# Patient Record
Sex: FEMALE | Race: BLACK OR AFRICAN AMERICAN | NOT HISPANIC OR LATINO | Employment: UNEMPLOYED | URBAN - METROPOLITAN AREA
[De-identification: names, ages, dates, MRNs, and addresses within clinical notes are randomized per-mention and may not be internally consistent; named-entity substitution may affect disease eponyms.]

---

## 2021-06-25 ENCOUNTER — OFFICE VISIT (OUTPATIENT)
Dept: FAMILY MEDICINE CLINIC | Facility: CLINIC | Age: 31
End: 2021-06-25

## 2021-06-25 VITALS
TEMPERATURE: 98 F | HEART RATE: 74 BPM | OXYGEN SATURATION: 99 % | DIASTOLIC BLOOD PRESSURE: 68 MMHG | BODY MASS INDEX: 24.91 KG/M2 | WEIGHT: 155 LBS | RESPIRATION RATE: 18 BRPM | SYSTOLIC BLOOD PRESSURE: 118 MMHG | HEIGHT: 66 IN

## 2021-06-25 DIAGNOSIS — M79.641 HAND PAIN, RIGHT: ICD-10-CM

## 2021-06-25 DIAGNOSIS — Z00.00 PHYSICAL EXAM, ANNUAL: Primary | ICD-10-CM

## 2021-06-25 DIAGNOSIS — M25.572 ACUTE LEFT ANKLE PAIN: ICD-10-CM

## 2021-06-25 DIAGNOSIS — M54.2 NECK PAIN: ICD-10-CM

## 2021-06-25 LAB
25(OH)D3 SERPL-MCNC: 8.2 NG/ML (ref 30–100)
ALBUMIN SERPL-MCNC: 3.1 G/DL (ref 3.5–5.2)
ALBUMIN/GLOB SERPL: 0.8 G/DL
ALP SERPL-CCNC: 38 U/L (ref 39–117)
ALT SERPL W P-5'-P-CCNC: 7 U/L (ref 1–33)
ANION GAP SERPL CALCULATED.3IONS-SCNC: 6.3 MMOL/L (ref 5–15)
AST SERPL-CCNC: 14 U/L (ref 1–32)
BILIRUB SERPL-MCNC: 0.2 MG/DL (ref 0–1.2)
BUN SERPL-MCNC: 12 MG/DL (ref 6–20)
BUN/CREAT SERPL: 15.6 (ref 7–25)
CALCIUM SPEC-SCNC: 8 MG/DL (ref 8.6–10.5)
CHLORIDE SERPL-SCNC: 108 MMOL/L (ref 98–107)
CHOLEST SERPL-MCNC: 69 MG/DL (ref 0–200)
CO2 SERPL-SCNC: 24.7 MMOL/L (ref 22–29)
CREAT SERPL-MCNC: 0.77 MG/DL (ref 0.57–1)
DEPRECATED RDW RBC AUTO: 46.4 FL (ref 37–54)
ERYTHROCYTE [DISTWIDTH] IN BLOOD BY AUTOMATED COUNT: 21.3 % (ref 12.3–15.4)
GFR SERPL CREATININE-BSD FRML MDRD: 107 ML/MIN/1.73
GLOBULIN UR ELPH-MCNC: 4 GM/DL
GLUCOSE SERPL-MCNC: 80 MG/DL (ref 65–99)
HCT VFR BLD AUTO: 27.1 % (ref 34–46.6)
HDLC SERPL-MCNC: 30 MG/DL (ref 40–60)
HGB BLD-MCNC: 7.2 G/DL (ref 12–15.9)
LDLC SERPL CALC-MCNC: 25 MG/DL (ref 0–100)
LDLC/HDLC SERPL: 0.92 {RATIO}
MCH RBC QN AUTO: 16.8 PG (ref 26.6–33)
MCHC RBC AUTO-ENTMCNC: 26.6 G/DL (ref 31.5–35.7)
MCV RBC AUTO: 63.2 FL (ref 79–97)
PLATELET # BLD AUTO: 240 10*3/MM3 (ref 140–450)
POTASSIUM SERPL-SCNC: 3.9 MMOL/L (ref 3.5–5.2)
PROT SERPL-MCNC: 7.1 G/DL (ref 6–8.5)
RBC # BLD AUTO: 4.29 10*6/MM3 (ref 3.77–5.28)
SODIUM SERPL-SCNC: 139 MMOL/L (ref 136–145)
TRIGL SERPL-MCNC: 57 MG/DL (ref 0–150)
VLDLC SERPL-MCNC: 14 MG/DL (ref 5–40)
WBC # BLD AUTO: 3.63 10*3/MM3 (ref 3.4–10.8)

## 2021-06-25 PROCEDURE — 85027 COMPLETE CBC AUTOMATED: CPT | Performed by: INTERNAL MEDICINE

## 2021-06-25 PROCEDURE — 99385 PREV VISIT NEW AGE 18-39: CPT | Performed by: INTERNAL MEDICINE

## 2021-06-25 PROCEDURE — 36415 COLL VENOUS BLD VENIPUNCTURE: CPT | Performed by: INTERNAL MEDICINE

## 2021-06-25 PROCEDURE — 82306 VITAMIN D 25 HYDROXY: CPT | Performed by: INTERNAL MEDICINE

## 2021-06-25 PROCEDURE — 80053 COMPREHEN METABOLIC PANEL: CPT | Performed by: INTERNAL MEDICINE

## 2021-06-25 PROCEDURE — 80061 LIPID PANEL: CPT | Performed by: INTERNAL MEDICINE

## 2021-06-25 RX ORDER — ERGOCALCIFEROL 1.25 MG/1
50000 CAPSULE ORAL WEEKLY
Qty: 16 CAPSULE | Refills: 3 | Status: SHIPPED | OUTPATIENT
Start: 2021-06-25 | End: 2021-09-27 | Stop reason: SDUPTHER

## 2021-06-25 NOTE — PROGRESS NOTES
Subjective   Dee Herrera is a 31 y.o. female.     Vitals:    06/25/21 1009   BP: 118/68   Pulse: 74   Resp: 18   Temp: 98 °F (36.7 °C)   SpO2: 99%      Body mass index is 25.02 kg/m².     History of Present Illness   Patient was seen for physical.  Patient's diet and physical activity were discussed at this visit.  Patient did have some right hand pain and was sent for x-ray of the right hand, patient also had left ankle swelling pain.  Patient was seen in the immediate care center and x-ray was negative for fracture.  Patient was advised to use an Aircast ice packs.  Patient states this is been going on now for 2 weeks.  Patient did have an MRI ordered.  Patient also had neck pain and C-spine x-rays were ordered.  Patient's labs came back with a hemoglobin of 7.2.  Patient was advised to go to the emergency room immediately.    Dictated utilizing Dragon dictation. If there are questions or for further clarification, please contact me.  The following portions of the patient's history were reviewed and updated as appropriate: allergies, current medications, past family history, past medical history, past social history, past surgical history and problem list.    Review of Systems   Constitutional: Negative for fatigue and fever.   HENT: Positive for congestion. Negative for trouble swallowing.    Eyes: Negative for discharge and visual disturbance.   Respiratory: Negative for choking and shortness of breath.    Cardiovascular: Negative for chest pain and palpitations.   Gastrointestinal: Negative for abdominal pain and blood in stool.   Endocrine: Negative.    Genitourinary: Negative for genital sores and hematuria.   Musculoskeletal: Positive for gait problem, joint swelling and neck pain.        Hand and left ankle pain   Skin: Negative for color change, pallor, rash and wound.   Allergic/Immunologic: Positive for environmental allergies. Negative for immunocompromised state.   Neurological: Negative for facial  asymmetry and speech difficulty.   Psychiatric/Behavioral: Negative for hallucinations and suicidal ideas.       Objective   Physical Exam  Vitals and nursing note reviewed.   Constitutional:       General: She is not in acute distress.     Appearance: Normal appearance. She is well-developed. She is not ill-appearing, toxic-appearing or diaphoretic.   HENT:      Head: Normocephalic and atraumatic.      Right Ear: Tympanic membrane, ear canal and external ear normal. There is no impacted cerumen.      Left Ear: Tympanic membrane, ear canal and external ear normal. There is no impacted cerumen.      Nose: Nose normal. No congestion or rhinorrhea.      Mouth/Throat:      Mouth: Mucous membranes are moist.      Pharynx: Oropharynx is clear. No oropharyngeal exudate or posterior oropharyngeal erythema.   Eyes:      General: No scleral icterus.        Right eye: No discharge.         Left eye: No discharge.      Extraocular Movements: Extraocular movements intact.      Conjunctiva/sclera: Conjunctivae normal.      Pupils: Pupils are equal, round, and reactive to light.   Neck:      Thyroid: No thyromegaly.      Vascular: No carotid bruit or JVD.      Trachea: No tracheal deviation.   Cardiovascular:      Rate and Rhythm: Normal rate and regular rhythm.      Heart sounds: Normal heart sounds. No murmur heard.   No friction rub. No gallop.    Pulmonary:      Effort: Pulmonary effort is normal. No respiratory distress.      Breath sounds: Normal breath sounds. No stridor. No wheezing, rhonchi or rales.   Chest:      Chest wall: No tenderness.   Abdominal:      General: Bowel sounds are normal. There is no distension.      Palpations: Abdomen is soft. There is no mass.      Tenderness: There is no abdominal tenderness. There is no right CVA tenderness, left CVA tenderness, guarding or rebound.      Hernia: No hernia is present.   Musculoskeletal:         General: Swelling, tenderness and deformity present. No signs of  injury. Normal range of motion.      Cervical back: Normal range of motion and neck supple. No rigidity. No muscular tenderness.      Right lower leg: No edema.      Left lower leg: Edema present.   Lymphadenopathy:      Cervical: No cervical adenopathy.   Skin:     General: Skin is warm and dry.      Coloration: Skin is not jaundiced or pale.      Findings: No bruising, erythema, lesion or rash.   Neurological:      General: No focal deficit present.      Mental Status: She is alert and oriented to person, place, and time. Mental status is at baseline.      Cranial Nerves: No cranial nerve deficit.      Sensory: No sensory deficit.      Motor: No weakness or abnormal muscle tone.      Coordination: Coordination normal.      Gait: Gait normal.      Deep Tendon Reflexes: Reflexes normal.   Psychiatric:         Mood and Affect: Mood normal.         Behavior: Behavior normal.         Thought Content: Thought content normal.         Judgment: Judgment normal.         Assessment/Plan #1 go to the emergency room #2 x-ray hand and C-spine #3 MRI of ankle  Problems Addressed this Visit     None      Visit Diagnoses     Physical exam, annual    -  Primary    Relevant Orders    CBC (No Diff) (Completed)    Comprehensive Metabolic Panel (Completed)    Lipid Panel (Completed)    Vitamin D 25 Hydroxy (Completed)    Hand pain, right        Relevant Orders    XR Hand 2 View Right    Acute left ankle pain        Relevant Orders    MRI Ankle Left Without Contrast    Neck pain        Relevant Orders    XR Spine Cervical Complete 4 or 5 View      Diagnoses     Diagnosis Codes Comments    Physical exam, annual    -  Primary ICD-10-CM: Z00.00  ICD-9-CM: V70.0     Hand pain, right     ICD-10-CM: M79.641  ICD-9-CM: 729.5     Acute left ankle pain     ICD-10-CM: M25.572  ICD-9-CM: 719.47     Neck pain     ICD-10-CM: M54.2  ICD-9-CM: 723.1

## 2021-08-09 ENCOUNTER — APPOINTMENT (OUTPATIENT)
Dept: MRI IMAGING | Facility: HOSPITAL | Age: 31
End: 2021-08-09

## 2021-09-04 ENCOUNTER — APPOINTMENT (OUTPATIENT)
Dept: MRI IMAGING | Facility: HOSPITAL | Age: 31
End: 2021-09-04

## 2021-09-13 ENCOUNTER — APPOINTMENT (OUTPATIENT)
Dept: MRI IMAGING | Facility: HOSPITAL | Age: 31
End: 2021-09-13

## 2021-09-27 ENCOUNTER — TELEMEDICINE (OUTPATIENT)
Dept: FAMILY MEDICINE CLINIC | Facility: CLINIC | Age: 31
End: 2021-09-27

## 2021-09-27 DIAGNOSIS — D50.9 IRON DEFICIENCY ANEMIA, UNSPECIFIED IRON DEFICIENCY ANEMIA TYPE: Primary | ICD-10-CM

## 2021-09-27 DIAGNOSIS — E55.9 VITAMIN D DEFICIENCY: ICD-10-CM

## 2021-09-27 DIAGNOSIS — R53.83 FATIGUE, UNSPECIFIED TYPE: ICD-10-CM

## 2021-09-27 DIAGNOSIS — M25.50 ARTHRALGIA, UNSPECIFIED JOINT: ICD-10-CM

## 2021-09-27 PROCEDURE — 99213 OFFICE O/P EST LOW 20 MIN: CPT | Performed by: NURSE PRACTITIONER

## 2021-09-27 RX ORDER — ERGOCALCIFEROL 1.25 MG/1
50000 CAPSULE ORAL WEEKLY
Qty: 16 CAPSULE | Refills: 3 | Status: SHIPPED | OUTPATIENT
Start: 2021-09-27 | End: 2022-01-24

## 2021-09-27 NOTE — PATIENT INSTRUCTIONS
She will return for labs. unavailable today.   Cont ibuprofen as needed.   Cont vit d, pending labs will add iron if needed.   Patient agrees with plan of care and understands instructions. Call if worsening symptoms or any problems or concerns.

## 2021-09-27 NOTE — PROGRESS NOTES
Chief Complaint  Fatigue (joint pain. )  You have chosen to receive care through a telehealth visit.  Do you consent to use a video/audio connection for your medical care today? Yes  Time spent during visit, 7 minutes.   C/o foot pain, states bilat great toenail pain, states she has sharp pains in right 5th finger, also getting on left hand, she states pain in great toes, she denies any injury, she states symptoms intermittent over the last year, tried ibuprofen OTC which helps but did not want to take daily, she does have family hx of autoimmune, denies ingrown toenails, denies hx of gout. With RAYMUNDO not taking iron, she also has vit d def. She states LMP 1 month ago. She denies heavy menses, she denies hematochezia, she does have fatigue.             Hien Herrera presents to [facility]  History of Present Illness    Objective   Vital Signs:   There were no vitals taken for this visit.    Physical Exam  Constitutional:       Appearance: Normal appearance.   HENT:      Head: Normocephalic.      Nose: Nose normal.   Eyes:      Pupils: Pupils are equal, round, and reactive to light.   Pulmonary:      Effort: Pulmonary effort is normal.   Neurological:      Mental Status: She is alert and oriented to person, place, and time.   Psychiatric:         Mood and Affect: Mood normal.         Behavior: Behavior normal.      physical exam limited d/t video visit.       Result Review :                She will return for labs. unavailable today.   Cont ibuprofen as needed.   Cont vit d, pending labs will add iron if needed.   Patient agrees with plan of care and understands instructions. Call if worsening symptoms or any problems or concerns.       Assessment and Plan    Diagnoses and all orders for this visit:    1. Iron deficiency anemia, unspecified iron deficiency anemia type (Primary)  -     CBC & Differential; Future  -     Ferritin; Future  -     Iron Profile; Future  -      Occult Blood, Fecal By Immunoassay - Stool, Per Rectum; Future  -     Vitamin B12 & Folate; Future  -     GIANFRANCO; Future  -     C-reactive Protein; Future  -     Rheumatoid Factor; Future  -     Sedimentation Rate; Future  -     Uric Acid; Future  -     Vitamin D 25 Hydroxy; Future  -     TSH; Future  -     Comprehensive Metabolic Panel; Future    2. Fatigue, unspecified type  -     CBC & Differential; Future  -     Ferritin; Future  -     Iron Profile; Future  -     Occult Blood, Fecal By Immunoassay - Stool, Per Rectum; Future  -     Vitamin B12 & Folate; Future  -     GIANFRANCO; Future  -     C-reactive Protein; Future  -     Rheumatoid Factor; Future  -     Sedimentation Rate; Future  -     Uric Acid; Future  -     Vitamin D 25 Hydroxy; Future  -     TSH; Future  -     Comprehensive Metabolic Panel; Future    3. Arthralgia, unspecified joint  -     CBC & Differential; Future  -     Ferritin; Future  -     Iron Profile; Future  -     Occult Blood, Fecal By Immunoassay - Stool, Per Rectum; Future  -     Vitamin B12 & Folate; Future  -     GIANFRANCO; Future  -     C-reactive Protein; Future  -     Rheumatoid Factor; Future  -     Sedimentation Rate; Future  -     Uric Acid; Future  -     Vitamin D 25 Hydroxy; Future  -     TSH; Future  -     Comprehensive Metabolic Panel; Future    4. Vitamin D deficiency  -     CBC & Differential; Future  -     Ferritin; Future  -     Iron Profile; Future  -     Occult Blood, Fecal By Immunoassay - Stool, Per Rectum; Future  -     Vitamin B12 & Folate; Future  -     GIANFRANCO; Future  -     C-reactive Protein; Future  -     Rheumatoid Factor; Future  -     Sedimentation Rate; Future  -     Uric Acid; Future  -     Vitamin D 25 Hydroxy; Future  -     TSH; Future  -     Comprehensive Metabolic Panel; Future        Follow Up   No follow-ups on file.  Patient was given instructions and counseling regarding her condition or for health maintenance advice. Please see specific information pulled into the AVS  if appropriate.

## 2021-09-27 NOTE — TELEPHONE ENCOUNTER
Caller: Dee Herrera    Relationship: Self    Medication requested (name and dosage): vitamin D (ERGOCALCIFEROL) 1.25 MG (63430 UT) capsule capsule    Pharmacy where request should be sent: [id number] [place] [id number] AT [facility] [phone] [facility] [phone]   [phone]    Additional details provided by patient:     Best call back number: [phone]    Does the patient have less than a 3 day supply:  [x] Yes  [] No    Ara Hendrix Rep   09/27/21 10:20 EDT

## 2022-01-24 ENCOUNTER — OFFICE VISIT (OUTPATIENT)
Dept: FAMILY MEDICINE CLINIC | Facility: CLINIC | Age: 32
End: 2022-01-24

## 2022-01-24 VITALS
HEART RATE: 89 BPM | WEIGHT: 140.6 LBS | HEIGHT: 66 IN | DIASTOLIC BLOOD PRESSURE: 76 MMHG | SYSTOLIC BLOOD PRESSURE: 132 MMHG | TEMPERATURE: 98 F | OXYGEN SATURATION: 100 % | BODY MASS INDEX: 22.6 KG/M2

## 2022-01-24 DIAGNOSIS — H65.03 NON-RECURRENT ACUTE SEROUS OTITIS MEDIA OF BOTH EARS: ICD-10-CM

## 2022-01-24 DIAGNOSIS — J06.9 UPPER RESPIRATORY TRACT INFECTION, UNSPECIFIED TYPE: Primary | ICD-10-CM

## 2022-01-24 DIAGNOSIS — H61.23 BILATERAL IMPACTED CERUMEN: ICD-10-CM

## 2022-01-24 PROCEDURE — 99213 OFFICE O/P EST LOW 20 MIN: CPT | Performed by: NURSE PRACTITIONER

## 2022-01-24 PROCEDURE — 69209 REMOVE IMPACTED EAR WAX UNI: CPT | Performed by: NURSE PRACTITIONER

## 2022-01-24 RX ORDER — AZITHROMYCIN 250 MG/1
TABLET, FILM COATED ORAL
Qty: 6 TABLET | Refills: 0 | Status: SHIPPED | OUTPATIENT
Start: 2022-01-24 | End: 2022-02-02

## 2022-01-24 RX ORDER — DOXYCYCLINE HYCLATE 50 MG/1
324 CAPSULE, GELATIN COATED ORAL
COMMUNITY
End: 2022-08-03 | Stop reason: ALTCHOICE

## 2022-01-24 RX ORDER — FLUTICASONE PROPIONATE 50 MCG
2 SPRAY, SUSPENSION (ML) NASAL DAILY
Qty: 16 G | Refills: 2 | Status: SHIPPED | OUTPATIENT
Start: 2022-01-24 | End: 2022-08-03

## 2022-01-24 RX ORDER — LORATADINE 10 MG/1
10 TABLET ORAL DAILY
Qty: 90 TABLET | Refills: 1 | Status: SHIPPED | OUTPATIENT
Start: 2022-01-24 | End: 2022-08-03

## 2022-01-24 RX ORDER — METHYLPREDNISOLONE 4 MG/1
TABLET ORAL
Qty: 21 TABLET | Refills: 0 | Status: SHIPPED | OUTPATIENT
Start: 2022-01-24 | End: 2022-02-02

## 2022-01-24 NOTE — PATIENT INSTRUCTIONS
Otitis Media, Adult    Otitis media occurs when there is inflammation and fluid in the middle ear space with signs and symptoms of an acute infection. The middle ear is a part of the ear that contains bones for hearing as well as air that helps send sounds to the brain. When infected fluid builds up in this space, it causes pressure and results in symptoms of acute otitis media. The eustachian tube connects the middle ear to the back of the nose (nasopharynx) and normally allows air into the middle ear space. If the eustachian tube becomes blocked, fluid can build up and become infected.  What are the causes?  This condition is caused by a blockage in the eustachian tube. This can be caused by an object like mucus, or by swelling (edema) of the tube. Problems that can cause a blockage include:  · A cold or other upper respiratory infection.  · Allergies.  · An irritant, such as tobacco smoke.  · Enlarged adenoids. The adenoids are areas of soft tissue located high in the back of the throat, behind the nose and the roof of the mouth. They are part of the body's defense system (immune system).  · A mass in the nasopharynx.  · Damage to the ear caused by pressure changes (barotrauma).  What are the signs or symptoms?  Symptoms of this condition include:  · Ear pain.  · Fever.  · Decreased hearing.  · Tiredness (lethargy).  · Fluid leaking from the ear, if the eardrum is ruptured or has burst.  · Ringing in the ear.  How is this diagnosed?    This condition is diagnosed with a physical exam. During the exam, your health care provider will use an instrument called an otoscope to look in your ear and check for redness, swelling, and fluid. He or she will also ask about your symptoms.  Your health care provider may also order tests, such as:  · A pneumatic otoscopy. This is a test to check the movement of the eardrum. It is done by squeezing a small amount of air into the ear.  · A tympanogram is a test that shows how well  the eardrum moves in response to air pressure in the ear canal. It provides a graph for your health care provider to review.  How is this treated?  This condition can go away on its own within 3-5 days. But if the condition is caused by a bacterial infection and does not go away on its own, or if it keeps coming back, your health care provider may:  · Prescribe antibiotic medicine to treat the infection.  · Prescribe or recommend medicines to control pain.  Follow these instructions at home:  · Take over-the-counter and prescription medicines only as told by your health care provider.  · If you were prescribed an antibiotic medicine, take it as told by your health care provider. Do not stop taking the antibiotic even if you start to feel better.  · Keep all follow-up visits as told by your health care provider. This is important.  Contact a health care provider if:  · You have bleeding from your nose.  · There is a lump on your neck.  · You are not feeling better in 5 days.  · You feel worse instead of better.  Get help right away if:  · You have severe pain that is not controlled with medicine.  · You have swelling, redness, or pain around your ear.  · You have stiffness in your neck.  · A part of your face is not moving (paralyzed).  · The bone behind your ear (mastoid) is tender when you touch it.  · You develop a severe headache.  Summary  · Otitis media is redness, soreness, and swelling of the middle ear, usually resulting in pain.  · This condition can go away on its own within 3-5 days.  · If the problem does not go away in 3-5 days, your health care provider may prescribe or recommend medicines to treat the infection or your symptoms.  · If you were prescribed an antibiotic medicine, take it as told by your health care provider.  · Follow all instructions you were given by your health care provider.  This information is not intended to replace advice given to you by your health care provider. Make sure you  discuss any questions you have with your health care provider.  Document Revised: 11/19/2020 Document Reviewed: 11/19/2020  Elsevier Patient Education © 2021 [employer]  Upper Respiratory Infection, Adult  An upper respiratory infection (URI) is a common viral infection of the nose, throat, and upper air passages that lead to the lungs. The most common type of URI is the common cold. URIs usually get better on their own, without medical treatment.  What are the causes?  A URI is caused by a virus. You may catch a virus by:  · Breathing in droplets from an infected person's cough or sneeze.  · Touching something that has been exposed to the virus (contaminated) and then touching your mouth, nose, or eyes.  What increases the risk?  You are more likely to get a URI if:  · You are very young or very old.  · It is daniel or winter.  · You have close contact with others, such as at a , school, or health care facility.  · You smoke.  · You have long-term (chronic) heart or lung disease.  · You have a weakened disease-fighting (immune) system.  · You have nasal allergies or asthma.  · You are experiencing a lot of stress.  · You work in an area that has poor air circulation.  · You have poor nutrition.  What are the signs or symptoms?  A URI usually involves some of the following symptoms:  · Runny or stuffy (congested) nose.  · Sneezing.  · Cough.  · Sore throat.  · Headache.  · Fatigue.  · Fever.  · Loss of appetite.  · Pain in your forehead, behind your eyes, and over your cheekbones (sinus pain).  · Muscle aches.  · Redness or irritation of the eyes.  · Pressure in the ears or face.  How is this diagnosed?  This condition may be diagnosed based on your medical history and symptoms, and a physical exam. Your health care provider may use a cotton swab to take a mucus sample from your nose (nasal swab). This sample can be tested to determine what virus is causing the illness.  How is this treated?  URIs usually  get better on their own within 7-10 days. You can take steps at home to relieve your symptoms. Medicines cannot cure URIs, but your health care provider may recommend certain medicines to help relieve symptoms, such as:  · Over-the-counter cold medicines.  · Cough suppressants. Coughing is a type of defense against infection that helps to clear the respiratory system, so take these medicines only as recommended by your health care provider.  · Fever-reducing medicines.  Follow these instructions at home:  Activity  · Rest as needed.  · If you have a fever, stay home from work or school until your fever is gone or until your health care provider says you are no longer contagious. Your health care provider may have you wear a face mask to prevent your infection from spreading.  Relieving symptoms  · Gargle with a salt-water mixture 3-4 times a day or as needed. To make a salt-water mixture, completely dissolve ½-1 tsp of salt in 1 cup of warm water.  · Use a cool-mist humidifier to add moisture to the air. This can help you breathe more easily.  Eating and drinking    · Drink enough fluid to keep your urine pale yellow.  · Eat soups and other clear broths.    General instructions    · Take over-the-counter and prescription medicines only as told by your health care provider. These include cold medicines, fever reducers, and cough suppressants.  · Do not use any products that contain nicotine or tobacco, such as cigarettes and e-cigarettes. If you need help quitting, ask your health care provider.  · Stay away from secondhand smoke.  · Stay up to date on all immunizations, including the yearly (annual) flu vaccine.  · Keep all follow-up visits as told by your health care provider. This is important.    How to prevent the spread of infection to others    · URIs can be passed from person to person (are contagious). To prevent the infection from spreading:  ? Wash your hands often with soap and water. If soap and water  are not available, use hand .  ? Avoid touching your mouth, face, eyes, or nose.  ? Cough or sneeze into a tissue or your sleeve or elbow instead of into your hand or into the air.    Contact a health care provider if:  · You are getting worse instead of better.  · You have a fever or chills.  · Your mucus is brown or red.  · You have yellow or brown discharge coming from your nose.  · You have pain in your face, especially when you bend forward.  · You have swollen neck glands.  · You have pain while swallowing.  · You have white areas in the back of your throat.  Get help right away if:  · You have shortness of breath that gets worse.  · You have severe or persistent:  ? Headache.  ? Ear pain.  ? Sinus pain.  ? Chest pain.  · You have chronic lung disease along with any of the following:  ? Wheezing.  ? Prolonged cough.  ? Coughing up blood.  ? A change in your usual mucus.  · You have a stiff neck.  · You have changes in your:  ? Vision.  ? Hearing.  ? Thinking.  ? Mood.  Summary  · An upper respiratory infection (URI) is a common infection of the nose, throat, and upper air passages that lead to the lungs.  · A URI is caused by a virus.  · URIs usually get better on their own within 7-10 days.  · Medicines cannot cure URIs, but your health care provider may recommend certain medicines to help relieve symptoms.  This information is not intended to replace advice given to you by your health care provider. Make sure you discuss any questions you have with your health care provider.  Document Revised: 12/26/2019 Document Reviewed: 08/03/2018  [employer] Patient Education © 2021 [employer] Inc.

## 2022-01-24 NOTE — PROGRESS NOTES
"Chief Complaint  rt ear lose hearing and cough drainge back throat (+on 5th for covid)    Subjective          Dee Herrera presents to [facility]  History of Present Illness   31 yr old female, pt of Dr. Corey, new to me, presenting for nasal congestion, cough and fullness of rt ear, reports thick yellow mucus, use of Mucinex with no resolution, tested positive for Covid on January 5 with no symptoms during that time, retested 3 days ago and was negative.  Cerumen impaction of bilateral ears, verbally agrees with cerumen irrigation.  Denies fever,SOA, loss of taste or smell.     Objective   Vital Signs:   /76   Pulse 89   Temp 98 °F (36.7 °C)   Ht 167.6 cm (66\")   Wt 63.8 kg (140 lb 9.6 oz)   SpO2 100%   BMI 22.69 kg/m²     Physical Exam  Constitutional:       Appearance: She is well-developed.   HENT:      Right Ear: A middle ear effusion is present. There is impacted cerumen.      Left Ear: A middle ear effusion is present. There is impacted cerumen.      Nose: Congestion present.      Right Sinus: Maxillary sinus tenderness present.      Left Sinus: Maxillary sinus tenderness present.      Mouth/Throat:      Pharynx: Posterior oropharyngeal erythema present.      Tonsils: No tonsillar exudate. 1+ on the right. 1+ on the left.   Cardiovascular:      Rate and Rhythm: Normal rate and regular rhythm.   Pulmonary:      Effort: Pulmonary effort is normal.      Breath sounds: Normal breath sounds.   Lymphadenopathy:      Cervical: Cervical adenopathy present.      Right cervical: Superficial cervical adenopathy present.      Left cervical: Superficial cervical adenopathy present.   Neurological:      Mental Status: She is alert and oriented to person, place, and time.        Result Review :          Ear Cerumen Removal    Date/Time: 1/24/2022 8:43 AM  Performed by: Conchita Carvajal APRN  Authorized by: Conchita Carvajal APRN   Consent: Verbal consent obtained.  Risks and " benefits: risks, benefits and alternatives were discussed  Consent given by: patient  Patient understanding: patient states understanding of the procedure being performed  Patient identity confirmed: verbally with patient    Anesthesia:  Local Anesthetic: none  Location details: left ear and right ear  Patient tolerance: patient tolerated the procedure well with no immediate complications  Comments: Patient reports better hearing of bilateral ears after irrigation  Procedure type: irrigation   Sedation:  Patient sedated: no              Assessment and Plan    Diagnoses and all orders for this visit:    1. Upper respiratory tract infection, unspecified type (Primary)  -     azithromycin (Zithromax Z-Saulo) 250 MG tablet; Take 2 tablets by mouth on day 1, then 1 tablet daily on days 2-5  Dispense: 6 tablet; Refill: 0  -     fluticasone (Flonase) 50 MCG/ACT nasal spray; 2 sprays into the nostril(s) as directed by provider Daily.  Dispense: 16 g; Refill: 2  -     methylPREDNISolone (Medrol) 4 MG dose pack; follow package directions  Dispense: 21 tablet; Refill: 0    2. Bilateral impacted cerumen  -     Ear Cerumen Removal    3. Non-recurrent acute serous otitis media of both ears  -     fluticasone (Flonase) 50 MCG/ACT nasal spray; 2 sprays into the nostril(s) as directed by provider Daily.  Dispense: 16 g; Refill: 2  -     methylPREDNISolone (Medrol) 4 MG dose pack; follow package directions  Dispense: 21 tablet; Refill: 0    Other orders  -     loratadine (Claritin) 10 MG tablet; Take 1 tablet by mouth Daily.  Dispense: 90 tablet; Refill: 1  -     Cancel: Cerumen Removal        Follow Up   Return in about 5 months (around 6/24/2022) for Annual physical.  Patient was given instructions and counseling regarding her condition or for health maintenance advice. Please see specific information pulled into the AVS if appropriate.

## 2022-02-02 ENCOUNTER — OFFICE VISIT (OUTPATIENT)
Dept: FAMILY MEDICINE CLINIC | Facility: CLINIC | Age: 32
End: 2022-02-02

## 2022-02-02 VITALS
SYSTOLIC BLOOD PRESSURE: 142 MMHG | HEART RATE: 75 BPM | OXYGEN SATURATION: 96 % | WEIGHT: 140.8 LBS | TEMPERATURE: 98 F | BODY MASS INDEX: 23.46 KG/M2 | DIASTOLIC BLOOD PRESSURE: 90 MMHG | HEIGHT: 65 IN

## 2022-02-02 DIAGNOSIS — E55.9 VITAMIN D DEFICIENCY: Primary | ICD-10-CM

## 2022-02-02 DIAGNOSIS — E55.9 VITAMIN D DEFICIENCY: ICD-10-CM

## 2022-02-02 DIAGNOSIS — D50.9 IRON DEFICIENCY ANEMIA, UNSPECIFIED IRON DEFICIENCY ANEMIA TYPE: ICD-10-CM

## 2022-02-02 DIAGNOSIS — Z00.00 HEALTHCARE MAINTENANCE: Primary | ICD-10-CM

## 2022-02-02 DIAGNOSIS — Z12.4 PAP SMEAR FOR CERVICAL CANCER SCREENING: ICD-10-CM

## 2022-02-02 LAB
25(OH)D3 SERPL-MCNC: 11.8 NG/ML (ref 30–100)
HCV AB SER DONR QL: NORMAL
IRON 24H UR-MRATE: 12 MCG/DL (ref 37–145)
IRON SATN MFR SERPL: 3 % (ref 20–50)
TIBC SERPL-MCNC: 390 MCG/DL (ref 298–536)
TRANSFERRIN SERPL-MCNC: 262 MG/DL (ref 200–360)

## 2022-02-02 PROCEDURE — 82306 VITAMIN D 25 HYDROXY: CPT | Performed by: NURSE PRACTITIONER

## 2022-02-02 PROCEDURE — 36415 COLL VENOUS BLD VENIPUNCTURE: CPT | Performed by: NURSE PRACTITIONER

## 2022-02-02 PROCEDURE — 99395 PREV VISIT EST AGE 18-39: CPT | Performed by: NURSE PRACTITIONER

## 2022-02-02 PROCEDURE — 86803 HEPATITIS C AB TEST: CPT | Performed by: NURSE PRACTITIONER

## 2022-02-02 PROCEDURE — 84466 ASSAY OF TRANSFERRIN: CPT | Performed by: NURSE PRACTITIONER

## 2022-02-02 PROCEDURE — 83540 ASSAY OF IRON: CPT | Performed by: NURSE PRACTITIONER

## 2022-02-02 RX ORDER — ERGOCALCIFEROL 1.25 MG/1
50000 CAPSULE ORAL WEEKLY
Qty: 12 CAPSULE | Refills: 1 | Status: SHIPPED | OUTPATIENT
Start: 2022-02-02 | End: 2022-11-14

## 2022-02-02 NOTE — PROGRESS NOTES
"Chief Complaint  Annual Exam and pap smear    Subjective          Dee Herrera presents to [facility]  History of Present Illness   31 yr old female, pt of Dr. Corey, new to me, presenting for annual exam with Pap. With RAYMUNDO, currently not taking supplementation, last Iron 14 on 21. With vitamin D deficiency, no current supplementation, last vitamin D level 8.2 on 2021.  She does perform self breast exam, no prior mammogram, Last CHL 69, Trigly 57, HDL 30, LDL 25 on 21.  She has no complaints or concerns today.       : 0  Para: 0  Menses: regular, no spotting between cycles.  BC: none, sexually active with same partner, use of condom  Pap: 3 years ago, reports normal exam, actual results not currently available.   HPV: denies, considering vaccination.       Objective   Vital Signs:   /90 (BP Location: Left arm, Patient Position: Sitting, Cuff Size: Adult)   Pulse 75   Temp 98 °F (36.7 °C) (Infrared)   Ht 165.1 cm (65\")   Wt 63.9 kg (140 lb 12.8 oz)   SpO2 96%   BMI 23.43 kg/m²     Physical Exam  Vitals reviewed.   Constitutional:       Appearance: She is well-developed.   Neck:      Thyroid: No thyromegaly.   Cardiovascular:      Rate and Rhythm: Normal rate and regular rhythm.      Heart sounds: Normal heart sounds. No murmur heard.  No gallop.    Pulmonary:      Effort: Pulmonary effort is normal.      Breath sounds: Normal breath sounds. No wheezing or rales.   Chest:      Chest wall: No tenderness.   Breasts:      Right: No mass, nipple discharge or skin change.      Left: No mass, nipple discharge or skin change.       Abdominal:      Tenderness: There is no abdominal tenderness.   Genitourinary:     Exam position: Supine.      Labia:         Right: No rash or lesion.         Left: No rash or lesion.       Vagina: Normal. No vaginal discharge, erythema, tenderness or bleeding.      Cervix: No cervical motion tenderness, discharge or friability. "      Uterus: Not enlarged and not tender.       Adnexa:         Right: No mass, tenderness or fullness.          Left: No mass, tenderness or fullness.     Skin:     General: Skin is warm.      Findings: No rash.   Neurological:      Mental Status: She is alert and oriented to person, place, and time.   Psychiatric:         Behavior: Behavior normal.         Thought Content: Thought content normal.         Judgment: Judgment normal.        Result Review :                 Assessment and Plan    Diagnoses and all orders for this visit:    1. Healthcare maintenance (Primary)  -     Hepatitis C antibody    2. Pap smear for cervical cancer screening  -     Cancel: Liquid-based Pap Smear, Screening  -     Pap IG (Image Guided)    3. Vitamin D deficiency  -     Vitamin D 25 Hydroxy    4. Iron deficiency anemia, unspecified iron deficiency anemia type  -     Iron and TIBC        Follow Up   Return in about 4 months (around 6/2/2022), or if symptoms worsen or fail to improve.  Patient was given instructions and counseling regarding her condition or for health maintenance advice. Please see specific information pulled into the AVS if appropriate.

## 2022-02-02 NOTE — PATIENT INSTRUCTIONS
Preventing Cervical Cancer  Cervical cancer is cancer that grows on the cervix. The cervix is at the bottom of the uterus. It connects the uterus to the vagina. The uterus is where a baby develops during pregnancy.  Cancer occurs when cells become abnormal and start to grow out of control. If cervical cancer is not found early, it can spread and become dangerous. Cervical cancer cannot always be prevented, but you can take steps to lower your risk of developing this condition.  How can this condition affect me?  Cervical cancer grows slowly and may not cause any symptoms at first. Over time, the cancer can grow deep into the cervix tissue and spread to other areas. This may take years, and it may happen without you knowing about it.  If it is found early, cervical cancer can be treated effectively. If the cancer has grown deep into your cervix or has spread, it will be more difficult to treat.  Most cases of cervical cancer are caused by an STI (sexually transmitted infection) called human papillomavirus (HPV). One way to reduce your risk of cervical cancer is to take steps to avoid infection with the HPV virus. Getting regular Pap tests is also important because this can help identify changes in cells that could lead to cancer. Your chances of getting this disease can also be reduced by making certain lifestyle changes.  What can increase my risk?  You are more likely to develop this condition if:  · You have certain things in your sexual history, such as:  ? Having a sexually transmitted viral infection. These include chlamydia and herpes.  ? Having more than one sexual partner, or having sex with someone who has more than one sexual partner.  ? Not using condoms during sex.  ? Having been sexually active before the age of 18.  · Your mother took a medicine called diethylstilbestrol (ILIR) while pregnant with you, causing you to be exposed to this medicine before birth.  · Your mother or sister has had cervical  cancer.  · You are between the ages of 40-50.  · You have or have had certain other medical conditions, such as:  ? Previous cancer of the vagina or vulva.  ? A weakened body defense system (immune system).  ? A history of dysplasia of the cervix.  · You use oral contraceptives, also called birth control pills.  · You smoke or breathe in secondhand smoke.  What actions can I take to prevent cervical cancer?  Preventing HPV infection    · Ask your health care provider about getting the HPV vaccine. If you are 26 years old or younger, you may need to get this vaccine, which is given in three doses over 6 months. This vaccine protects against the types of HPV that could cause cancer.  · Limit the number of people you have sex with. Also avoid having sex with people who have had many sex partners.  · Use a latex condom every time you have sex.    Getting Pap tests  Get Pap tests regularly, starting at age 21. Talk with your health care provider about how often you need these tests. Having regular Pap tests will help identify changes in cells that could lead to cancer. Steps can then be taken to prevent cancer from developing.  · Most women who are 21?29 years of age should have a Pap test every 3 years.  · Most women who are 30?65 years of age should have a Pap test in combination with an HPV test every 5 years.  · Women with a higher risk of cervical cancer, such as those with a weakened immune system or those who were exposed to ILIR medicine before birth, may need more frequent testing.  Making other lifestyle changes    · Do not use any products that contain nicotine or tobacco, such as cigarettes, e-cigarettes, and chewing tobacco. If you need help quitting, ask your health care provider.  · Eat a healthy diet that includes at least 5 servings of fruits and vegetables every day.  · Lose weight if you are overweight.    Where to find support  Talk with your health care provider, school nurse, or local [facility]  department for guidance about screening and vaccination.  Some children and teens may be able to get the HPV vaccine free of charge through the [place] government's Vaccines for Children (VFC) program. Other places that provide vaccinations include:  · Public health clinics. Check with your local health department.  · Federal Qualified [facility], where you would pay only what you can afford. To find one near you, check this website: www.Critical access hospital.org/find-an-Critical access hospital/  · Rural Health Clinics. These are part of a program for Medicare and Medicaid patients who live in rural areas.  The National Breast and Cervical Cancer Early Detection Program also provides breast and cervical cancer screenings and diagnostic services to low-income, uninsured, and underinsured women.  Cervical cancer can be passed down through families. Talk with your health care provider or a genetic counselor to learn more about genetic testing for cancer.  Where to find more information  Learn more about cervical cancer from:  · American College of Gynecology: www.acog.org  · American Cancer Society: www.cancer.org  · Centers for Disease Control and Prevention: www.cdc.gov  Contact a health care provider if you have:  · Pelvic pain.  · Unusual discharge or bleeding from your vagina.  Summary  · Cervical cancer is cancer that grows on the cervix. The cervix is at the bottom of the uterus.  · Ask your health care provider about getting the HPV vaccine.  · Be sure to get regular Pap tests as recommended by your health care provider.  · See your health care provider right away if you have any pelvic pain or unusual discharge or bleeding from your vagina.  This information is not intended to replace advice given to you by your health care provider. Make sure you discuss any questions you have with your health care provider.  Document Revised: 07/20/2020 Document Reviewed: 07/20/2020  Elsevier Patient Education © 2021 [employer]  Vitamin D  Deficiency  Vitamin D deficiency is when your body does not have enough vitamin D. Vitamin D is important to your body for many reasons:  · It helps the body absorb two important minerals--calcium and phosphorus.  · It plays a role in bone health.  · It may help to prevent some diseases, such as diabetes and multiple sclerosis.  · It plays a role in muscle function, including heart function.  If vitamin D deficiency is severe, it can cause a condition in which your bones become soft. In adults, this condition is called osteomalacia. In children, this condition is called rickets.  What are the causes?  This condition may be caused by:  · Not eating enough foods that contain vitamin D.  · Not getting enough natural sun exposure.  · Having certain digestive system diseases that make it difficult for your body to absorb vitamin D. These diseases include Crohn's disease, chronic pancreatitis, and cystic fibrosis.  · Having a surgery in which a part of the stomach or a part of the small intestine is removed.  · Having chronic kidney disease or liver disease.  What increases the risk?  You are more likely to develop this condition if you:  · Are older.  · Do not spend much time outdoors.  · Live in a long-term care facility.  · Have had broken bones.  · Have weak or thin bones (osteoporosis).  · Have a disease or condition that changes how the body absorbs vitamin D.  · Have dark skin.  · Take certain medicines, such as steroid medicines or certain seizure medicines.  · Are overweight or obese.  What are the signs or symptoms?  In mild cases of vitamin D deficiency, there may not be any symptoms. If the condition is severe, symptoms may include:  · Bone pain.  · Muscle pain.  · Falling often.  · Broken bones caused by a minor injury.  How is this diagnosed?  This condition may be diagnosed with blood tests. Imaging tests such as X-rays may also be done to look for changes in the bone.  How is this treated?  Treatment for  this condition may depend on what caused the condition. Treatment options include:  · Taking vitamin D supplements. Your health care provider will suggest what dose is best for you.  · Taking a calcium supplement. Your health care provider will suggest what dose is best for you.  Follow these instructions at home:  Eating and drinking    · Eat foods that contain vitamin D. Choices include:  ? Fortified dairy products, cereals, or juices. Fortified means that vitamin D has been added to the food. Check the label on the package to see if the food is fortified.  ? Fatty fish, such as salmon or trout.  ? Eggs.  ? Oysters.  ? Mushrooms.  The items listed above may not be a complete list of recommended foods and beverages. Contact a dietitian for more information.  General instructions  · Take medicines and supplements only as told by your health care provider.  · Get regular, safe exposure to natural sunlight.  · Do not use a tanning bed.  · Maintain a healthy weight. Lose weight if needed.  · Keep all follow-up visits as told by your health care provider. This is important.  How is this prevented?  You can get vitamin D by:  · Eating foods that naturally contain vitamin D.  · Eating or drinking products that have been fortified with vitamin D, such as cereals, juices, and dairy products (including milk).  · Taking a vitamin D supplement or a multivitamin supplement that contains vitamin D.  · Being in the sun. Your body naturally makes vitamin D when your skin is exposed to sunlight. Your body changes the sunlight into a form of the vitamin that it can use.  Contact a health care provider if:  · Your symptoms do not go away.  · You feel nauseous or you vomit.  · You have fewer bowel movements than usual or are constipated.  Summary  · Vitamin D deficiency is when your body does not have enough vitamin D.  · Vitamin D is important to your body for good bone health and muscle function, and it may help prevent some  diseases.  · Vitamin D deficiency is primarily treated through supplementation. Your health care provider will suggest what dose is best for you.  · You can get vitamin D by eating foods that contain vitamin D, by being in the sun, and by taking a vitamin D supplement or a multivitamin supplement that contains vitamin D.  This information is not intended to replace advice given to you by your health care provider. Make sure you discuss any questions you have with your health care provider.  Document Revised: 08/26/2019 Document Reviewed: 08/26/2019  Elsevier Patient Education © 2021 [employer]    Vitamin D Deficiency  Vitamin D deficiency is when your body does not have enough vitamin D. Vitamin D is important to your body for many reasons:  · It helps the body absorb two important minerals--calcium and phosphorus.  · It plays a role in bone health.  · It may help to prevent some diseases, such as diabetes and multiple sclerosis.  · It plays a role in muscle function, including heart function.  If vitamin D deficiency is severe, it can cause a condition in which your bones become soft. In adults, this condition is called osteomalacia. In children, this condition is called rickets.  What are the causes?  This condition may be caused by:  · Not eating enough foods that contain vitamin D.  · Not getting enough natural sun exposure.  · Having certain digestive system diseases that make it difficult for your body to absorb vitamin D. These diseases include Crohn's disease, chronic pancreatitis, and cystic fibrosis.  · Having a surgery in which a part of the stomach or a part of the small intestine is removed.  · Having chronic kidney disease or liver disease.  What increases the risk?  You are more likely to develop this condition if you:  · Are older.  · Do not spend much time outdoors.  · Live in a long-term care facility.  · Have had broken bones.  · Have weak or thin bones (osteoporosis).  · Have a disease or  condition that changes how the body absorbs vitamin D.  · Have dark skin.  · Take certain medicines, such as steroid medicines or certain seizure medicines.  · Are overweight or obese.  What are the signs or symptoms?  In mild cases of vitamin D deficiency, there may not be any symptoms. If the condition is severe, symptoms may include:  · Bone pain.  · Muscle pain.  · Falling often.  · Broken bones caused by a minor injury.  How is this diagnosed?  This condition may be diagnosed with blood tests. Imaging tests such as X-rays may also be done to look for changes in the bone.  How is this treated?  Treatment for this condition may depend on what caused the condition. Treatment options include:  · Taking vitamin D supplements. Your health care provider will suggest what dose is best for you.  · Taking a calcium supplement. Your health care provider will suggest what dose is best for you.  Follow these instructions at home:  Eating and drinking    · Eat foods that contain vitamin D. Choices include:  ? Fortified dairy products, cereals, or juices. Fortified means that vitamin D has been added to the food. Check the label on the package to see if the food is fortified.  ? Fatty fish, such as salmon or trout.  ? Eggs.  ? Oysters.  ? Mushrooms.  The items listed above may not be a complete list of recommended foods and beverages. Contact a dietitian for more information.  General instructions  · Take medicines and supplements only as told by your health care provider.  · Get regular, safe exposure to natural sunlight.  · Do not use a tanning bed.  · Maintain a healthy weight. Lose weight if needed.  · Keep all follow-up visits as told by your health care provider. This is important.  How is this prevented?  You can get vitamin D by:  · Eating foods that naturally contain vitamin D.  · Eating or drinking products that have been fortified with vitamin D, such as cereals, juices, and dairy products (including  milk).  · Taking a vitamin D supplement or a multivitamin supplement that contains vitamin D.  · Being in the sun. Your body naturally makes vitamin D when your skin is exposed to sunlight. Your body changes the sunlight into a form of the vitamin that it can use.  Contact a health care provider if:  · Your symptoms do not go away.  · You feel nauseous or you vomit.  · You have fewer bowel movements than usual or are constipated.  Summary  · Vitamin D deficiency is when your body does not have enough vitamin D.  · Vitamin D is important to your body for good bone health and muscle function, and it may help prevent some diseases.  · Vitamin D deficiency is primarily treated through supplementation. Your health care provider will suggest what dose is best for you.  · You can get vitamin D by eating foods that contain vitamin D, by being in the sun, and by taking a vitamin D supplement or a multivitamin supplement that contains vitamin D.  This information is not intended to replace advice given to you by your health care provider. Make sure you discuss any questions you have with your health care provider.  Document Revised: 08/26/2019 Document Reviewed: 08/26/2019  [employer] Patient Education © 2021 [employer]    Iron Deficiency Anemia, Adult  Iron deficiency anemia is a condition in which the concentration of red blood cells or hemoglobin in the blood is below normal because of too little iron. Hemoglobin is a substance in red blood cells that carries oxygen to the body's tissues. When the concentration of red blood cells or hemoglobin is too low, not enough oxygen reaches these tissues.  Iron deficiency anemia is usually long-lasting, and it develops over time. It may or may not cause symptoms. It is a common type of anemia.  What are the causes?  This condition may be caused by:  · Not enough iron in the diet.  · Abnormal absorption in the gut.  · Increased need for iron because of pregnancy or heavy menstrual  periods, for females.  · Cancers of the gastrointestinal system, such as colon cancer.  · Blood loss caused by bleeding in the intestine. This may be from a gastrointestinal condition like Crohn's disease.  · Frequent blood draws, such as from blood donation.  What increases the risk?  The following factors may make you more likely to develop this condition:  · Being pregnant.  · Being a teenage girl going through a growth spurt.  What are the signs or symptoms?  Symptoms of this condition may include:  · Pale skin, lips, and nail beds.  · Weakness, dizziness, and getting tired easily.  · Headache.  · Shortness of breath when moving or exercising.  · Cold hands and feet.  · Fast or irregular heartbeat.  · Irritability or rapid breathing. These are more common in severe anemia.  Mild anemia may not cause any symptoms.  How is this diagnosed?  This condition is diagnosed based on:  · Your medical history.  · A physical exam.  · Blood tests.  You may have additional tests to find the underlying cause of your anemia, such as:  · Testing for blood in the stool (fecal occult blood test).  · A procedure to see inside your colon and rectum (colonoscopy).  · A procedure to see inside your esophagus and stomach (endoscopy).  · A test in which cells are removed from bone marrow (bone marrow aspiration) or fluid is removed from the bone marrow to be examined. This is rarely needed.  How is this treated?  This condition is treated by correcting the cause of your iron deficiency. Treatment may involve:  · Adding iron-rich foods to your diet.  · Taking iron supplements. If you are pregnant or breastfeeding, you may need to take extra iron because your normal diet usually does not provide the amount of iron that you need.  · Increasing vitamin C intake. Vitamin C helps your body absorb iron. Your health care provider may recommend that you take iron supplements along with a glass of orange juice or a vitamin C  supplement.  · Medicines to make heavy menstrual flow lighter.  · Surgery.  You may need repeat blood tests to determine whether treatment is working. If the treatment does not seem to be working, you may need more tests.  Follow these instructions at home:  Medicines  · Take over-the-counter and prescription medicines only as told by your health care provider. This includes iron supplements and vitamins.  ? For the best iron absorption, you should take iron supplements when your stomach is empty. If you cannot tolerate them on an empty stomach, you may need to take them with food.  ? Do not drink milk or take antacids at the same time as your iron supplements. Milk and antacids may interfere with iron absorption.  ? Iron supplements may turn stool (feces) a darker color and it may appear black.  · If you cannot tolerate taking iron supplements by mouth, talk with your health care provider about taking them through an IV or through an injection into a muscle.  Eating and drinking    · Talk with your health care provider before changing your diet. He or she may recommend that you eat foods that contain a lot of iron, such as:  ? Liver.  ? Low-fat (lean) beef.  ? Breads and cereals that have iron added to them (are fortified).  ? Eggs.  ? Dried fruit.  ? Dark green, leafy vegetables.  · To help your body use the iron from iron-rich foods, eat those foods at the same time as fresh fruits and vegetables that are high in vitamin C. Foods that are high in vitamin C include:  ? Oranges.  ? Peppers.  ? Tomatoes.  ? Mangoes.  · Drink enough fluid to keep your urine pale yellow.    Managing constipation  If you are taking an iron supplement, it may cause constipation. To prevent or treat constipation, you may need to:  · Take over-the-counter or prescription medicines.  · Eat foods that are high in fiber, such as beans, whole grains, and fresh fruits and vegetables.  · Limit foods that are high in fat and processed sugars,  such as fried or sweet foods.  General instructions  · Return to your normal activities as told by your health care provider. Ask your health care provider what activities are safe for you.  · Practice good hygiene. Anemia can make you more prone to illness and infection.  · Keep all follow-up visits as told by your health care provider. This is important.  Contact a health care provider if you:  · Feel nauseous or you vomit.  · Feel weak.  · Have unexplained sweating.  · Develop symptoms of constipation, such as:  ? Having fewer than three bowel movements a week.  ? Straining to have a bowel movement.  ? Having stools that are hard, dry, or larger than normal.  ? Feeling full or bloated.  ? Pain in the lower abdomen.  ? Not feeling relief after having a bowel movement.  Get help right away if you:  · Faint. If this happens, do not drive yourself to the hospital.  · Have chest pain.  · Have shortness of breath that:  ? Is severe.  ? Gets worse with physical activity.  · Have an irregular or rapid heartbeat.  · Become light-headed when getting up from a sitting or lying down position.  These symptoms may represent a serious problem that is an emergency. Do not wait to see if the symptoms will go away. Get medical help right away. Call your local emergency services (911 in the [place] Do not drive yourself to the hospital.  Summary  · Iron deficiency anemia is a condition in which the concentration of red blood cells or hemoglobin in the blood is below normal because of too little iron.  · This condition is treated by correcting the cause of your iron deficiency.  · Take over-the-counter and prescription medicines only as told by your health care provider. This includes iron supplements and vitamins.  · To help your body use the iron from iron-rich foods, eat those foods at the same time as fresh fruits and vegetables that are high in vitamin C.  · Get help right away if you have shortness of breath that gets worse  with physical activity.  This information is not intended to replace advice given to you by your health care provider. Make sure you discuss any questions you have with your health care provider.  Document Revised: 08/25/2020 Document Reviewed: 08/25/2020  Elsevier Patient Education © 2021 [employer].

## 2022-02-08 LAB
CYTOLOGIST CVX/VAG CYTO: ABNORMAL
CYTOLOGY CVX/VAG DOC CYTO: ABNORMAL
CYTOLOGY CVX/VAG DOC THIN PREP: ABNORMAL
DX ICD CODE: ABNORMAL
DX ICD CODE: ABNORMAL
HIV 1 & 2 AB SER-IMP: ABNORMAL
OTHER STN SPEC: ABNORMAL
PATHOLOGIST CVX/VAG CYTO: ABNORMAL
RECOM F/U CVX/VAG CYTO: ABNORMAL
STAT OF ADQ CVX/VAG CYTO-IMP: ABNORMAL

## 2022-02-09 DIAGNOSIS — R87.610 ATYPICAL SQUAMOUS CELL OF UNDETERMINED SIGNIFICANCE OF CERVIX: Primary | ICD-10-CM

## 2022-03-28 ENCOUNTER — TELEPHONE (OUTPATIENT)
Dept: OBSTETRICS AND GYNECOLOGY | Age: 32
End: 2022-03-28

## 2022-03-28 NOTE — TELEPHONE ENCOUNTER
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

## 2022-04-21 ENCOUNTER — PATIENT ROUNDING (BHMG ONLY) (OUTPATIENT)
Dept: OBSTETRICS AND GYNECOLOGY | Age: 32
End: 2022-04-21

## 2022-04-21 ENCOUNTER — OFFICE VISIT (OUTPATIENT)
Dept: OBSTETRICS AND GYNECOLOGY | Age: 32
End: 2022-04-21

## 2022-04-21 VITALS
DIASTOLIC BLOOD PRESSURE: 74 MMHG | SYSTOLIC BLOOD PRESSURE: 126 MMHG | WEIGHT: 140.2 LBS | HEIGHT: 65 IN | BODY MASS INDEX: 23.36 KG/M2

## 2022-04-21 DIAGNOSIS — Z11.51 SCREENING FOR HUMAN PAPILLOMAVIRUS: ICD-10-CM

## 2022-04-21 DIAGNOSIS — D64.9 ANEMIA, UNSPECIFIED TYPE: ICD-10-CM

## 2022-04-21 DIAGNOSIS — R87.610: ICD-10-CM

## 2022-04-21 DIAGNOSIS — M25.579 ARTHRALGIA OF ANKLE, UNSPECIFIED LATERALITY: ICD-10-CM

## 2022-04-21 DIAGNOSIS — N92.6 IRREGULAR MENSES: ICD-10-CM

## 2022-04-21 DIAGNOSIS — M25.60 MORNING STIFFNESS OF JOINTS: ICD-10-CM

## 2022-04-21 DIAGNOSIS — Z01.419 ENCOUNTER FOR GYNECOLOGICAL EXAMINATION WITHOUT ABNORMAL FINDING: Primary | ICD-10-CM

## 2022-04-21 PROCEDURE — 99204 OFFICE O/P NEW MOD 45 MIN: CPT | Performed by: OBSTETRICS & GYNECOLOGY

## 2022-04-21 RX ORDER — CHLORHEXIDINE GLUCONATE 0.12 MG/ML
RINSE ORAL
COMMUNITY
Start: 2022-03-24 | End: 2022-07-30 | Stop reason: HOSPADM

## 2022-04-21 RX ORDER — IBUPROFEN 800 MG/1
TABLET ORAL
COMMUNITY
Start: 2022-03-24 | End: 2022-07-30 | Stop reason: HOSPADM

## 2022-04-21 RX ORDER — AMOXICILLIN 500 MG/1
CAPSULE ORAL
COMMUNITY
Start: 2022-03-24 | End: 2022-07-15

## 2022-04-21 NOTE — PROGRESS NOTES
New GYN Problem    Chief Complaint   Patient presents with   • Gynecologic Exam     New pt, Referral from [facility]ch[facility] Carvajal for abnormal pap ASCUS +       Dee Herrera presents regarding abnormal pap. She notes that this was the first abnormal pap, last pap was perhaps 3-4 yrs ago.  She had been having regular menses up until this year, recently did not have menses for 3 months and was worried about pregnancy.  She has taken pregnancy test that are negative.  She is not currently sexually active, broke up with her partner about 7 months ago but is interested in finding a new partner and becoming pregnant sometime this year.  Her menses are not heavy she states  She has had significant anemia, on review of records last year she had a hemoglobin of 7.4.  She was not offered transfusion at the time.  She has never seen hematology.  She notes that over the past year she has had issues with joint swelling, particularly in her ankles without trauma or other inciting event.  She also has been having joint stiffness in the morning and a difficult time getting going for the first hour or so.    She has been losing weight out of the blue, not trying to lose weight. Has lost 10-15 lbs over the past few months.    She is  for [employer]    Has been told by her mom that she has had immune problems but does not know exactly what they are  Has a cousin with sickle cell disease    Review of Systems   Constitutional: Positive for fatigue and unexpected weight change. Negative for fever.   Respiratory: Negative for shortness of breath.    Cardiovascular: Negative for chest pain.   Gastrointestinal: Negative for abdominal pain, constipation and diarrhea.   Genitourinary: Negative for frequency and urgency.   Musculoskeletal: Positive for joint swelling.   Hematological: Negative for adenopathy.   Psychiatric/Behavioral: Negative for dysphoric mood.       Histories  Past Medical History:   Diagnosis Date   • Other  "specified nutritional anemias        Past Surgical History:   Procedure Laterality Date   • NO PAST SURGERIES         Family History   Problem Relation Age of Onset   • Autoimmune disease Mother    • Sickle cell anemia Cousin        Social History     Socioeconomic History   • Marital status: Single   Tobacco Use   • Smoking status: Former Smoker   Vaping Use   • Vaping Use: Never used   Substance and Sexual Activity   • Alcohol use: Yes     Comment: social   • Drug use: Never   • Sexual activity: Not Currently     Partners: Male     Birth control/protection: None       OB History        0    Para   0    Term   0       0    AB   0    Living   0       SAB   0    IAB   0    Ectopic   0    Molar   0    Multiple   0    Live Births   0                Physical Exam    /74   Ht 165.1 cm (65\")   Wt 63.6 kg (140 lb 3.2 oz)   LMP 2022 (Within Days)   Breastfeeding No   BMI 23.33 kg/m²     BMI: Body mass index is 23.33 kg/m².     General:   alert, appears stated age and cooperative   Neck Nontender, no lymphadenopathy, no thyromegaly   Lung lungs clear to auscultation, no wheezes or rhonchi   Heart: heart regular rate and rhythm, no murmurs   Abdomen: soft, non-tender, without masses or organomegaly   Urethra and bladder: urethral meatus normal; bladder nontender to palpation;   Vulva: normal, Bartholin's, Urethra, [place]'s normal   Vagina: normal mucosa, normal discharge   Cervix: no lesions and nulliparous appearance   Uterus: normal size and anteverted   Adnexa: normal adnexa and no mass, fullness, tenderness       Assessment/Plan    Diagnoses and all orders for this visit:    1. Encounter for gynecological examination without abnormal finding (Primary)  -     IGP, Rfx Aptima HPV ASCU    2. Atypical squamous cell changes cervix of undetermined significance favor dysplasia  -     US Non-ob Transvaginal  -     IGP, Rfx Aptima HPV ASCU    3. Arthralgia of ankle, unspecified laterality  -     " Sedimentation Rate  -     C-reactive Protein  -     Cyclic Citrul Peptide Antibody, IgG / IgA  -     RHEUMATOID FACTOR  -     Comprehensive Metabolic Panel    4. Morning stiffness of joints  -     Sedimentation Rate  -     C-reactive Protein  -     Cyclic Citrul Peptide Antibody, IgG / IgA  -     RHEUMATOID FACTOR  -     Comprehensive Metabolic Panel    5. Anemia, unspecified type  -     CBC (No Diff)  -     Iron Profile  -     Ferritin  -     Hemoglobinopathy Fractionation Cascade  -     Vitamin B12  -     Folate    6. Irregular menses  -     TSH Rfx On Abnormal To Free T4  -     Prolactin    7. Screening for human papillomavirus      Reviewed records, she had an ASCUS Pap but HPV was not performed.  Repeat Pap with reflex to HPV ordered today.  She likely has HPV, reviewed the natural history of human papilloma virus and its relationship to cervical cancer.  If Pap abnormal and HPV positive, recommend colposcopy.  If negative, repeat Pap 1 year.  She has had significant anemia in the past year, but does not seem to be related to menses as she states they are not heavy.  Some additional labs ordered regarding this.  Also has some signs and symptoms concerning for autoimmune process such as rheumatoid arthritis.  She appears to have had labs ordered last year regarding this but they were not completed.  I reordered them today.      Winnie Sanchez MD  04/21/2022  14:44 EDT

## 2022-04-23 LAB
ALBUMIN SERPL-MCNC: 3 G/DL (ref 3.9–5)
ALBUMIN/GLOB SERPL: 0.7 {RATIO} (ref 1.2–2.2)
ALP SERPL-CCNC: 46 IU/L (ref 44–121)
ALT SERPL-CCNC: 6 IU/L (ref 0–32)
AST SERPL-CCNC: 17 IU/L (ref 0–40)
BILIRUB SERPL-MCNC: <0.2 MG/DL (ref 0–1.2)
BUN SERPL-MCNC: 7 MG/DL (ref 6–20)
BUN/CREAT SERPL: 9 (ref 9–23)
CALCIUM SERPL-MCNC: 8.5 MG/DL (ref 8.7–10.2)
CCP IGA+IGG SERPL IA-ACNC: 10 UNITS (ref 0–19)
CHLORIDE SERPL-SCNC: 105 MMOL/L (ref 96–106)
CO2 SERPL-SCNC: 23 MMOL/L (ref 20–29)
CREAT SERPL-MCNC: 0.81 MG/DL (ref 0.57–1)
CRP SERPL-MCNC: 6 MG/L (ref 0–10)
EGFRCR SERPLBLD CKD-EPI 2021: 101 ML/MIN/1.73
ERYTHROCYTE [DISTWIDTH] IN BLOOD BY AUTOMATED COUNT: 21 % (ref 11.7–15.4)
ERYTHROCYTE [SEDIMENTATION RATE] IN BLOOD BY WESTERGREN METHOD: 79 MM/HR (ref 0–32)
FERRITIN SERPL-MCNC: 21 NG/ML (ref 15–150)
FOLATE SERPL-MCNC: 11.7 NG/ML
GLOBULIN SER CALC-MCNC: 4.1 G/DL (ref 1.5–4.5)
GLUCOSE SERPL-MCNC: 77 MG/DL (ref 65–99)
HCT VFR BLD AUTO: 28.3 % (ref 34–46.6)
HGB A MFR BLD ELPH: 97.8 % (ref 96.4–98.8)
HGB A2 MFR BLD ELPH: 2.2 % (ref 1.8–3.2)
HGB BLD-MCNC: 7.9 G/DL (ref 11.1–15.9)
HGB F MFR BLD ELPH: 0 % (ref 0–2)
HGB FRACT BLD-IMP: NORMAL
HGB S MFR BLD ELPH: 0 %
IRON SATN MFR SERPL: 6 % (ref 15–55)
IRON SERPL-MCNC: 17 UG/DL (ref 27–159)
MCH RBC QN AUTO: 17.9 PG (ref 26.6–33)
MCHC RBC AUTO-ENTMCNC: 27.9 G/DL (ref 31.5–35.7)
MCV RBC AUTO: 64 FL (ref 79–97)
PLATELET # BLD AUTO: 272 X10E3/UL (ref 150–450)
POTASSIUM SERPL-SCNC: 4.2 MMOL/L (ref 3.5–5.2)
PROLACTIN SERPL-MCNC: 20.8 NG/ML (ref 4.8–23.3)
PROT SERPL-MCNC: 7.1 G/DL (ref 6–8.5)
RBC # BLD AUTO: 4.42 X10E6/UL (ref 3.77–5.28)
RHEUMATOID FACT SERPL-ACNC: 11.2 IU/ML
SODIUM SERPL-SCNC: 138 MMOL/L (ref 134–144)
TIBC SERPL-MCNC: 290 UG/DL (ref 250–450)
TSH SERPL DL<=0.005 MIU/L-ACNC: 1.63 UIU/ML (ref 0.45–4.5)
UIBC SERPL-MCNC: 273 UG/DL (ref 131–425)
VIT B12 SERPL-MCNC: 464 PG/ML (ref 232–1245)
WBC # BLD AUTO: 3.9 X10E3/UL (ref 3.4–10.8)

## 2022-04-25 DIAGNOSIS — D64.9 SEVERE ANEMIA: Primary | ICD-10-CM

## 2022-04-25 DIAGNOSIS — D50.8 OTHER IRON DEFICIENCY ANEMIA: Primary | ICD-10-CM

## 2022-04-25 DIAGNOSIS — D50.9 IRON DEFICIENCY ANEMIA, UNSPECIFIED IRON DEFICIENCY ANEMIA TYPE: ICD-10-CM

## 2022-04-25 RX ORDER — SODIUM CHLORIDE 9 MG/ML
250 INJECTION, SOLUTION INTRAVENOUS ONCE
OUTPATIENT
Start: 2022-04-25

## 2022-04-25 NOTE — PROGRESS NOTES
Called and reviewed labs with her, she has significant iron deficiency anemia and I recommended IV iron infusion.  She has been taking iron orally, and so seems to have malabsorptive issue perhaps.  Recommended referral to hematology as well.  Rest for labs are not consistent with having rheumatoid arthritis, but her sed rate is a bit elevated which could be related to anemia.   Follow up with me 5/25 and will check FOBT then as well.    Winnie Sanchez MD  4/25/2022  13:25 EDT

## 2022-04-27 LAB
CONV .: NORMAL
CYTOLOGIST CVX/VAG CYTO: NORMAL
CYTOLOGY CVX/VAG DOC CYTO: NORMAL
CYTOLOGY CVX/VAG DOC THIN PREP: NORMAL
DX ICD CODE: NORMAL
HIV 1 & 2 AB SER-IMP: NORMAL
OTHER STN SPEC: NORMAL
STAT OF ADQ CVX/VAG CYTO-IMP: NORMAL

## 2022-04-27 NOTE — PROGRESS NOTES
Please notify that her Pap was normal, there were no abnormal cells found.    Winnie Sanchez MD  4/27/2022  16:04 EDT

## 2022-04-29 ENCOUNTER — APPOINTMENT (OUTPATIENT)
Dept: LAB | Facility: HOSPITAL | Age: 32
End: 2022-04-29

## 2022-05-09 ENCOUNTER — HOSPITAL ENCOUNTER (OUTPATIENT)
Dept: INFUSION THERAPY | Facility: HOSPITAL | Age: 32
Discharge: HOME | End: 2022-05-09

## 2022-05-16 ENCOUNTER — HOSPITAL ENCOUNTER (OUTPATIENT)
Dept: INFUSION THERAPY | Facility: HOSPITAL | Age: 32
Discharge: HOME | End: 2022-05-16

## 2022-05-23 ENCOUNTER — HOSPITAL ENCOUNTER (OUTPATIENT)
Dept: INFUSION THERAPY | Facility: HOSPITAL | Age: 32
Discharge: HOME | End: 2022-05-23

## 2022-05-25 ENCOUNTER — TELEPHONE (OUTPATIENT)
Dept: OBSTETRICS AND GYNECOLOGY | Age: 32
End: 2022-05-25

## 2022-05-25 NOTE — TELEPHONE ENCOUNTER
Caller: Dee Herrera    Relationship: Self    Best call back number: [phone]    PT CALLED TO CANCEL AND R/S FOR 7-11-22 @ 8:45AM    Do you require a callback: NO

## 2022-07-11 ENCOUNTER — TELEPHONE (OUTPATIENT)
Dept: ONCOLOGY | Facility: CLINIC | Age: 32
End: 2022-07-11

## 2022-07-11 ENCOUNTER — OFFICE VISIT (OUTPATIENT)
Dept: OBSTETRICS AND GYNECOLOGY | Age: 32
End: 2022-07-11

## 2022-07-11 VITALS
DIASTOLIC BLOOD PRESSURE: 82 MMHG | WEIGHT: 142.4 LBS | HEIGHT: 65 IN | BODY MASS INDEX: 23.72 KG/M2 | SYSTOLIC BLOOD PRESSURE: 140 MMHG

## 2022-07-11 DIAGNOSIS — D50.9 IRON DEFICIENCY ANEMIA, UNSPECIFIED IRON DEFICIENCY ANEMIA TYPE: Primary | ICD-10-CM

## 2022-07-11 PROCEDURE — 99213 OFFICE O/P EST LOW 20 MIN: CPT | Performed by: OBSTETRICS & GYNECOLOGY

## 2022-07-11 NOTE — PROGRESS NOTES
"Subjective     Chief Complaint   Patient presents with   • Gynecologic Exam     Follow up iron deficiency        Dee Herrera is a 32 y.o.  whose LMP is Patient's last menstrual period was 2022. presents to follow-up regarding anemia.  She again states that she does not have heavy menses.  They are regular and short.  No flooding or saturating of pads.  She has felt tired and cold recently.  She denies any rectal bleeding.  She had asked her family and notes that her mother and brother also have issues with anemia.  She had an appointment scheduled with hematology, noted in the chart, but she did not go.  She also had iron infusions ordered and scheduled but states that she could not find the location.      The following portions of the patient's history were reviewed and updated as appropriate:vital signs, allergies, current medications, past medical history, past social history, past surgical history and problem list    Objective      /82   Ht 165.1 cm (65\")   Wt 64.6 kg (142 lb 6.4 oz)   LMP 2022   Breastfeeding No   BMI 23.70 kg/m²     Physical Exam   Well, no distress      Assessment & Plan     Diagnoses and all orders for this visit:    1. Iron deficiency anemia, unspecified iron deficiency anemia type (Primary)    She does not seem to have a menstrual problem contributing to her iron deficiency anemia.  Today we rescheduled her appointment with hematology and I highly encouraged her to go to the visit, we described in detail to where their office is.     Follow-up in 2 to 3 months    Winnie Sanchez MD  2022             "

## 2022-07-11 NOTE — TELEPHONE ENCOUNTER
Caller: Debra    Relationship: [facility]        What was the call regarding:     CALLING TO RESCHEDULE PATIENTS APPT, 05/02 MISSED WITH AUSTEN WINTERS TRANSFERRED TO ANA LAURA TO FURTHER ASSIST.

## 2022-07-12 DIAGNOSIS — D64.9 ANEMIA, UNSPECIFIED TYPE: Primary | ICD-10-CM

## 2022-07-15 ENCOUNTER — TELEMEDICINE (OUTPATIENT)
Dept: FAMILY MEDICINE CLINIC | Facility: TELEHEALTH | Age: 32
End: 2022-07-15

## 2022-07-15 DIAGNOSIS — J01.40 ACUTE NON-RECURRENT PANSINUSITIS: Primary | ICD-10-CM

## 2022-07-15 PROCEDURE — 99213 OFFICE O/P EST LOW 20 MIN: CPT | Performed by: NURSE PRACTITIONER

## 2022-07-15 RX ORDER — AMOXICILLIN AND CLAVULANATE POTASSIUM 875; 125 MG/1; MG/1
1 TABLET, FILM COATED ORAL 2 TIMES DAILY
Qty: 20 TABLET | Refills: 0 | Status: SHIPPED | OUTPATIENT
Start: 2022-07-15 | End: 2022-07-30 | Stop reason: HOSPADM

## 2022-07-15 RX ORDER — ONDANSETRON 4 MG/1
4 TABLET, ORALLY DISINTEGRATING ORAL EVERY 8 HOURS PRN
Qty: 12 TABLET | Refills: 0 | Status: SHIPPED | OUTPATIENT
Start: 2022-07-15 | End: 2022-11-14

## 2022-07-15 RX ORDER — METHYLPREDNISOLONE 4 MG/1
TABLET ORAL
Qty: 1 EACH | Refills: 0 | Status: SHIPPED | OUTPATIENT
Start: 2022-07-15 | End: 2022-07-30 | Stop reason: HOSPADM

## 2022-07-15 NOTE — PROGRESS NOTES
Subjective   Dee Herrera is a 32 y.o. female.     She has been congestion over the last week and she has symptoms 7 days. The right side of her face is swollen. She is having thick green phlegm coming out of her nose. She is having coughing fits that make her throw up. Not tender to push. She says she has been around a lot of dust recently.        The following portions of the patient's history were reviewed and updated as appropriate: allergies, current medications, past family history, past medical history, past social history, past surgical history, and problem list.    Review of Systems   Constitutional: Positive for fatigue. Negative for fever.   HENT: Positive for congestion, facial swelling, postnasal drip, rhinorrhea and sinus pressure. Negative for dental problem and mouth sores.    Respiratory: Positive for cough. Negative for shortness of breath and wheezing.        Objective   Physical Exam  Constitutional:       General: She is not in acute distress.     Appearance: She is well-developed. She is not diaphoretic.   HENT:      Head: Right periorbital erythema (mild swelling around eyes) and left periorbital erythema ( mild) present.      Jaw: Swelling (cheeks) present.   Pulmonary:      Effort: Pulmonary effort is normal.   Neurological:      Mental Status: She is alert and oriented to person, place, and time.   Psychiatric:         Behavior: Behavior normal.           Assessment & Plan   Diagnoses and all orders for this visit:    1. Acute non-recurrent pansinusitis (Primary)  -     amoxicillin-clavulanate (Augmentin) 875-125 MG per tablet; Take 1 tablet by mouth 2 (Two) Times a Day for 10 days.  Dispense: 20 tablet; Refill: 0  -     methylPREDNISolone (MEDROL) 4 MG dose pack; Take as directed on package instructions.  Dispense: 1 each; Refill: 0  -     ondansetron ODT (Zofran ODT) 4 MG disintegrating tablet; Place 1 tablet on the tongue Every 8 (Eight) Hours As Needed for Nausea or Vomiting.   Dispense: 12 tablet; Refill: 0                 The use of a video visit has been reviewed with the patient and verbal informed consent has been obtained. Myself and Dee Herrera participated in this visit. The patient is located in [place], [place]. I am located in [place], [place]. Mychart and Zoom were utilized. I spent 16 minutes in the patient's chart for this visit.

## 2022-07-15 NOTE — PATIENT INSTRUCTIONS
-Take all meds as prescribed even if you start feeling better  May use tylenol or warm moist compress on the face for pain. Avoid ibuprofen and sudafed while taking medrol.  -May use saline nasal spray, netipot or flonase as desired  -If symptoms worsen or do not improve in 1 week follow up with urgent care or your primary care provider

## 2022-07-20 ENCOUNTER — TRANSCRIBE ORDERS (OUTPATIENT)
Dept: ADMINISTRATIVE | Facility: HOSPITAL | Age: 32
End: 2022-07-20

## 2022-07-20 ENCOUNTER — INFUSION (OUTPATIENT)
Dept: ONCOLOGY | Facility: HOSPITAL | Age: 32
End: 2022-07-20

## 2022-07-20 ENCOUNTER — APPOINTMENT (OUTPATIENT)
Dept: GENERAL RADIOLOGY | Facility: HOSPITAL | Age: 32
End: 2022-07-20

## 2022-07-20 ENCOUNTER — CONSULT (OUTPATIENT)
Dept: ONCOLOGY | Facility: CLINIC | Age: 32
End: 2022-07-20

## 2022-07-20 ENCOUNTER — LAB (OUTPATIENT)
Dept: LAB | Facility: HOSPITAL | Age: 32
End: 2022-07-20

## 2022-07-20 ENCOUNTER — HOSPITAL ENCOUNTER (INPATIENT)
Facility: HOSPITAL | Age: 32
LOS: 10 days | Discharge: HOME | End: 2022-07-30
Attending: EMERGENCY MEDICINE | Admitting: INTERNAL MEDICINE

## 2022-07-20 VITALS
OXYGEN SATURATION: 100 % | RESPIRATION RATE: 18 BRPM | WEIGHT: 147.4 LBS | HEART RATE: 95 BPM | HEIGHT: 65 IN | DIASTOLIC BLOOD PRESSURE: 126 MMHG | TEMPERATURE: 97.8 F | SYSTOLIC BLOOD PRESSURE: 178 MMHG | BODY MASS INDEX: 24.56 KG/M2

## 2022-07-20 DIAGNOSIS — I10 UNCONTROLLED HYPERTENSION: ICD-10-CM

## 2022-07-20 DIAGNOSIS — M32.14 OTHER SYSTEMIC LUPUS ERYTHEMATOSUS WITH GLOMERULAR DISEASE: ICD-10-CM

## 2022-07-20 DIAGNOSIS — D69.6 THROMBOCYTOPENIA: ICD-10-CM

## 2022-07-20 DIAGNOSIS — D50.9 IRON DEFICIENCY ANEMIA, UNSPECIFIED IRON DEFICIENCY ANEMIA TYPE: Primary | ICD-10-CM

## 2022-07-20 DIAGNOSIS — D64.9 ANEMIA, UNSPECIFIED TYPE: ICD-10-CM

## 2022-07-20 DIAGNOSIS — D50.9 IRON DEFICIENCY ANEMIA, UNSPECIFIED IRON DEFICIENCY ANEMIA TYPE: ICD-10-CM

## 2022-07-20 DIAGNOSIS — D64.9 SEVERE ANEMIA: Primary | ICD-10-CM

## 2022-07-20 DIAGNOSIS — N17.9 ACUTE RENAL FAILURE, UNSPECIFIED ACUTE RENAL FAILURE TYPE: ICD-10-CM

## 2022-07-20 DIAGNOSIS — J81.0 ACUTE PULMONARY EDEMA: ICD-10-CM

## 2022-07-20 PROBLEM — I16.0 HYPERTENSIVE URGENCY: Status: ACTIVE | Noted: 2022-07-20

## 2022-07-20 PROBLEM — I15.1 HYPERTENSION SECONDARY TO OTHER RENAL DISORDERS: Status: ACTIVE | Noted: 2022-07-20

## 2022-07-20 PROBLEM — H92.11 EAR DRAINAGE RIGHT: Chronic | Status: ACTIVE | Noted: 2022-07-20

## 2022-07-20 PROBLEM — R79.89 ELEVATED TROPONIN: Status: ACTIVE | Noted: 2022-07-20

## 2022-07-20 PROBLEM — N28.89 HYPERTENSION SECONDARY TO OTHER RENAL DISORDERS: Status: ACTIVE | Noted: 2022-07-20

## 2022-07-20 PROBLEM — R77.8 ELEVATED TROPONIN: Status: ACTIVE | Noted: 2022-07-20

## 2022-07-20 PROBLEM — E87.70 VOLUME OVERLOAD: Status: ACTIVE | Noted: 2022-07-20

## 2022-07-20 PROBLEM — R11.2 NAUSEA AND VOMITING: Status: ACTIVE | Noted: 2022-07-20

## 2022-07-20 PROBLEM — E88.09 HYPOALBUMINEMIA: Status: ACTIVE | Noted: 2022-07-20

## 2022-07-20 LAB
ABO GROUP BLD: NORMAL
ALBUMIN SERPL-MCNC: 2.4 G/DL (ref 3.5–5.2)
ALBUMIN/GLOB SERPL: 0.5 G/DL (ref 1.1–2.4)
ALP SERPL-CCNC: 38 U/L (ref 38–116)
ALT SERPL W P-5'-P-CCNC: 6 U/L (ref 0–33)
ANION GAP SERPL CALCULATED.3IONS-SCNC: 11 MMOL/L (ref 5–15)
ANION GAP SERPL CALCULATED.3IONS-SCNC: 12.8 MMOL/L (ref 5–15)
APTT PPP: 46.6 SECONDS (ref 22.7–35.4)
AST SERPL-CCNC: 16 U/L (ref 0–32)
B2 MICROGLOB SERPL-MCNC: 41.4 MG/L (ref 0.8–2.2)
BASOPHILS # BLD AUTO: 0.02 10*3/MM3 (ref 0–0.2)
BASOPHILS # BLD AUTO: 0.03 10*3/MM3 (ref 0–0.2)
BASOPHILS NFR BLD AUTO: 0.4 % (ref 0–1.5)
BASOPHILS NFR BLD AUTO: 0.5 % (ref 0–1.5)
BILIRUB SERPL-MCNC: 0.3 MG/DL (ref 0.2–1.2)
BLD GP AB SCN SERPL QL: NEGATIVE
BUN SERPL-MCNC: 49 MG/DL (ref 6–20)
BUN SERPL-MCNC: 54 MG/DL (ref 6–20)
BUN/CREAT SERPL: 7 (ref 7.3–30)
BUN/CREAT SERPL: 7.2 (ref 7–25)
C3 SERPL-MCNC: 28 MG/DL (ref 82–167)
C4 SERPL-MCNC: <2 MG/DL (ref 14–44)
CALCIUM SPEC-SCNC: 7.9 MG/DL (ref 8.6–10.5)
CALCIUM SPEC-SCNC: 8.4 MG/DL (ref 8.5–10.2)
CHLORIDE SERPL-SCNC: 105 MMOL/L (ref 98–107)
CHLORIDE SERPL-SCNC: 106 MMOL/L (ref 98–107)
CK SERPL-CCNC: 60 U/L (ref 20–180)
CO2 SERPL-SCNC: 17.2 MMOL/L (ref 22–29)
CO2 SERPL-SCNC: 18 MMOL/L (ref 22–29)
CREAT SERPL-MCNC: 6.79 MG/DL (ref 0.57–1)
CREAT SERPL-MCNC: 7.74 MG/DL (ref 0.6–1.1)
CRP SERPL-MCNC: 3.08 MG/DL (ref 0–0.5)
D DIMER PPP FEU-MCNC: 4.88 MCGFEU/ML (ref 0–0.49)
D-LACTATE SERPL-SCNC: 0.9 MMOL/L (ref 0.5–2)
DAT POLY-SP REAG RBC QL: NEGATIVE
DEPRECATED RDW RBC AUTO: 53.3 FL (ref 37–54)
DEPRECATED RDW RBC AUTO: 53.7 FL (ref 37–54)
EGFRCR SERPLBLD CKD-EPI 2021: 6.7 ML/MIN/1.73
EGFRCR SERPLBLD CKD-EPI 2021: 7.8 ML/MIN/1.73
EOSINOPHIL # BLD AUTO: 0.04 10*3/MM3 (ref 0–0.4)
EOSINOPHIL # BLD AUTO: 0.05 10*3/MM3 (ref 0–0.4)
EOSINOPHIL NFR BLD AUTO: 0.7 % (ref 0.3–6.2)
EOSINOPHIL NFR BLD AUTO: 0.9 % (ref 0.3–6.2)
ERYTHROCYTE [DISTWIDTH] IN BLOOD BY AUTOMATED COUNT: 23.4 % (ref 12.3–15.4)
ERYTHROCYTE [DISTWIDTH] IN BLOOD BY AUTOMATED COUNT: 23.4 % (ref 12.3–15.4)
ERYTHROCYTE [SEDIMENTATION RATE] IN BLOOD: 47 MM/HR (ref 0–20)
FERRITIN SERPL-MCNC: 155.9 NG/ML (ref 11–207)
FOLATE SERPL-MCNC: 10.1 NG/ML (ref 4.78–24.2)
GLOBULIN UR ELPH-MCNC: 4.5 GM/DL (ref 1.8–3.5)
GLUCOSE SERPL-MCNC: 87 MG/DL (ref 74–124)
GLUCOSE SERPL-MCNC: 97 MG/DL (ref 65–99)
HAPTOGLOB SERPL-MCNC: 27 MG/DL (ref 30–200)
HAPTOGLOB SERPL-MCNC: 33 MG/DL (ref 30–200)
HCG SERPL QL: NEGATIVE
HCT VFR BLD AUTO: 17.7 % (ref 34–46.6)
HCT VFR BLD AUTO: 20.7 % (ref 34–46.6)
HGB BLD-MCNC: 5.2 G/DL (ref 12–15.9)
HGB BLD-MCNC: 6.2 G/DL (ref 12–15.9)
IMM GRANULOCYTES # BLD AUTO: 0.05 10*3/MM3 (ref 0–0.05)
IMM GRANULOCYTES NFR BLD AUTO: 0.7 % (ref 0–0.5)
INR PPP: 1.27 (ref 0.9–1.1)
IRON 24H UR-MRATE: 21 MCG/DL (ref 37–145)
IRON SATN MFR SERPL: 8 % (ref 14–48)
LDH SERPL-CCNC: 271 U/L (ref 135–214)
LDH SERPL-CCNC: 398 U/L (ref 99–259)
LYMPHOCYTES # BLD AUTO: 1.15 10*3/MM3 (ref 0.7–3.1)
LYMPHOCYTES # BLD AUTO: 2.08 10*3/MM3 (ref 0.7–3.1)
LYMPHOCYTES NFR BLD AUTO: 26.4 % (ref 19.6–45.3)
LYMPHOCYTES NFR BLD AUTO: 30.7 % (ref 19.6–45.3)
MCH RBC QN AUTO: 19.5 PG (ref 26.6–33)
MCH RBC QN AUTO: 19.6 PG (ref 26.6–33)
MCHC RBC AUTO-ENTMCNC: 29.4 G/DL (ref 31.5–35.7)
MCHC RBC AUTO-ENTMCNC: 30 G/DL (ref 31.5–35.7)
MCV RBC AUTO: 65.3 FL (ref 79–97)
MCV RBC AUTO: 66.3 FL (ref 79–97)
MONOCYTES # BLD AUTO: 0.33 10*3/MM3 (ref 0.1–0.9)
MONOCYTES # BLD AUTO: 0.7 10*3/MM3 (ref 0.1–0.9)
MONOCYTES NFR BLD AUTO: 10.3 % (ref 5–12)
MONOCYTES NFR BLD AUTO: 7.6 % (ref 5–12)
NEUTROPHILS NFR BLD AUTO: 2.79 10*3/MM3 (ref 1.7–7)
NEUTROPHILS NFR BLD AUTO: 3.87 10*3/MM3 (ref 1.7–7)
NEUTROPHILS NFR BLD AUTO: 57.2 % (ref 42.7–76)
NEUTROPHILS NFR BLD AUTO: 64.1 % (ref 42.7–76)
NRBC BLD AUTO-RTO: 0 /100 WBC (ref 0–0.2)
NT-PROBNP SERPL-MCNC: ABNORMAL PG/ML (ref 0–450)
PLATELET # BLD AUTO: 79 10*3/MM3 (ref 140–450)
PLATELET # BLD AUTO: 83 10*3/MM3 (ref 140–450)
PLATELETS.RETICULATED NFR BLD AUTO: 13.1 % (ref 0.9–6.5)
POTASSIUM SERPL-SCNC: 5.2 MMOL/L (ref 3.5–4.7)
POTASSIUM SERPL-SCNC: 5.2 MMOL/L (ref 3.5–5.2)
PROCALCITONIN SERPL-MCNC: 0.66 NG/ML (ref 0–0.25)
PROT SERPL-MCNC: 6.9 G/DL (ref 6.3–8)
PROTHROMBIN TIME: 15.7 SECONDS (ref 11.7–14.2)
QT INTERVAL: 358 MS
RBC # BLD AUTO: 2.67 10*6/MM3 (ref 3.77–5.28)
RBC # BLD AUTO: 3.17 10*6/MM3 (ref 3.77–5.28)
RH BLD: POSITIVE
SARS-COV-2 RNA RESP QL NAA+PROBE: NOT DETECTED
SODIUM SERPL-SCNC: 135 MMOL/L (ref 134–145)
SODIUM SERPL-SCNC: 135 MMOL/L (ref 136–145)
T&S EXPIRATION DATE: NORMAL
TIBC SERPL-MCNC: 252 MCG/DL (ref 249–505)
TRANSFERRIN SERPL-MCNC: 180 MG/DL (ref 200–360)
TROPONIN T SERPL-MCNC: 0.11 NG/ML (ref 0–0.03)
TROPONIN T SERPL-MCNC: 0.15 NG/ML (ref 0–0.03)
TROPONIN T SERPL-MCNC: 0.16 NG/ML (ref 0–0.03)
TSH SERPL DL<=0.05 MIU/L-ACNC: 3.02 UIU/ML (ref 0.27–4.2)
VIT B12 BLD-MCNC: 713 PG/ML (ref 211–946)
WBC NRBC COR # BLD: 4.35 10*3/MM3 (ref 3.4–10.8)
WBC NRBC COR # BLD: 6.78 10*3/MM3 (ref 3.4–10.8)

## 2022-07-20 PROCEDURE — 71045 X-RAY EXAM CHEST 1 VIEW: CPT

## 2022-07-20 PROCEDURE — 25010000002 FUROSEMIDE PER 20 MG: Performed by: EMERGENCY MEDICINE

## 2022-07-20 PROCEDURE — 83615 LACTATE (LD) (LDH) ENZYME: CPT | Performed by: INTERNAL MEDICINE

## 2022-07-20 PROCEDURE — 84703 CHORIONIC GONADOTROPIN ASSAY: CPT | Performed by: EMERGENCY MEDICINE

## 2022-07-20 PROCEDURE — 86225 DNA ANTIBODY NATIVE: CPT | Performed by: INTERNAL MEDICINE

## 2022-07-20 PROCEDURE — 86235 NUCLEAR ANTIGEN ANTIBODY: CPT | Performed by: INTERNAL MEDICINE

## 2022-07-20 PROCEDURE — 84443 ASSAY THYROID STIM HORMONE: CPT | Performed by: INTERNAL MEDICINE

## 2022-07-20 PROCEDURE — 85730 THROMBOPLASTIN TIME PARTIAL: CPT | Performed by: INTERNAL MEDICINE

## 2022-07-20 PROCEDURE — P9016 RBC LEUKOCYTES REDUCED: HCPCS

## 2022-07-20 PROCEDURE — 84484 ASSAY OF TROPONIN QUANT: CPT | Performed by: INTERNAL MEDICINE

## 2022-07-20 PROCEDURE — 93005 ELECTROCARDIOGRAM TRACING: CPT | Performed by: EMERGENCY MEDICINE

## 2022-07-20 PROCEDURE — 86850 RBC ANTIBODY SCREEN: CPT | Performed by: INTERNAL MEDICINE

## 2022-07-20 PROCEDURE — 99285 EMERGENCY DEPT VISIT HI MDM: CPT

## 2022-07-20 PROCEDURE — C1752 CATH,HEMODIALYSIS,SHORT-TERM: HCPCS

## 2022-07-20 PROCEDURE — 36430 TRANSFUSION BLD/BLD COMPNT: CPT

## 2022-07-20 PROCEDURE — 05HM33Z INSERTION OF INFUSION DEVICE INTO RIGHT INTERNAL JUGULAR VEIN, PERCUTANEOUS APPROACH: ICD-10-PCS | Performed by: SURGERY

## 2022-07-20 PROCEDURE — 86140 C-REACTIVE PROTEIN: CPT | Performed by: INTERNAL MEDICINE

## 2022-07-20 PROCEDURE — 84145 PROCALCITONIN (PCT): CPT | Performed by: INTERNAL MEDICINE

## 2022-07-20 PROCEDURE — 86037 ANCA TITER EACH ANTIBODY: CPT | Performed by: INTERNAL MEDICINE

## 2022-07-20 PROCEDURE — 86038 ANTINUCLEAR ANTIBODIES: CPT | Performed by: INTERNAL MEDICINE

## 2022-07-20 PROCEDURE — 85025 COMPLETE CBC W/AUTO DIFF WBC: CPT

## 2022-07-20 PROCEDURE — 83605 ASSAY OF LACTIC ACID: CPT | Performed by: INTERNAL MEDICINE

## 2022-07-20 PROCEDURE — 86334 IMMUNOFIX E-PHORESIS SERUM: CPT | Performed by: INTERNAL MEDICINE

## 2022-07-20 PROCEDURE — 83516 IMMUNOASSAY NONANTIBODY: CPT | Performed by: INTERNAL MEDICINE

## 2022-07-20 PROCEDURE — 86880 COOMBS TEST DIRECT: CPT | Performed by: INTERNAL MEDICINE

## 2022-07-20 PROCEDURE — 86920 COMPATIBILITY TEST SPIN: CPT

## 2022-07-20 PROCEDURE — 84484 ASSAY OF TROPONIN QUANT: CPT | Performed by: EMERGENCY MEDICINE

## 2022-07-20 PROCEDURE — 83010 ASSAY OF HAPTOGLOBIN QUANT: CPT | Performed by: INTERNAL MEDICINE

## 2022-07-20 PROCEDURE — 84466 ASSAY OF TRANSFERRIN: CPT | Performed by: INTERNAL MEDICINE

## 2022-07-20 PROCEDURE — 82728 ASSAY OF FERRITIN: CPT | Performed by: INTERNAL MEDICINE

## 2022-07-20 PROCEDURE — 85397 CLOTTING FUNCT ACTIVITY: CPT | Performed by: INTERNAL MEDICINE

## 2022-07-20 PROCEDURE — 86900 BLOOD TYPING SEROLOGIC ABO: CPT | Performed by: INTERNAL MEDICINE

## 2022-07-20 PROCEDURE — 5A1D70Z PERFORMANCE OF URINARY FILTRATION, INTERMITTENT, LESS THAN 6 HOURS PER DAY: ICD-10-PCS | Performed by: INTERNAL MEDICINE

## 2022-07-20 PROCEDURE — 84165 PROTEIN E-PHORESIS SERUM: CPT | Performed by: INTERNAL MEDICINE

## 2022-07-20 PROCEDURE — 86160 COMPLEMENT ANTIGEN: CPT | Performed by: INTERNAL MEDICINE

## 2022-07-20 PROCEDURE — 85610 PROTHROMBIN TIME: CPT | Performed by: INTERNAL MEDICINE

## 2022-07-20 PROCEDURE — 80053 COMPREHEN METABOLIC PANEL: CPT | Performed by: INTERNAL MEDICINE

## 2022-07-20 PROCEDURE — 99205 OFFICE O/P NEW HI 60 MIN: CPT | Performed by: INTERNAL MEDICINE

## 2022-07-20 PROCEDURE — 82746 ASSAY OF FOLIC ACID SERUM: CPT | Performed by: INTERNAL MEDICINE

## 2022-07-20 PROCEDURE — 83521 IG LIGHT CHAINS FREE EACH: CPT | Performed by: INTERNAL MEDICINE

## 2022-07-20 PROCEDURE — 85055 RETICULATED PLATELET ASSAY: CPT | Performed by: INTERNAL MEDICINE

## 2022-07-20 PROCEDURE — 86900 BLOOD TYPING SEROLOGIC ABO: CPT

## 2022-07-20 PROCEDURE — 85025 COMPLETE CBC W/AUTO DIFF WBC: CPT | Performed by: EMERGENCY MEDICINE

## 2022-07-20 PROCEDURE — 86901 BLOOD TYPING SEROLOGIC RH(D): CPT | Performed by: INTERNAL MEDICINE

## 2022-07-20 PROCEDURE — 82784 ASSAY IGA/IGD/IGG/IGM EACH: CPT | Performed by: INTERNAL MEDICINE

## 2022-07-20 PROCEDURE — 82550 ASSAY OF CK (CPK): CPT | Performed by: INTERNAL MEDICINE

## 2022-07-20 PROCEDURE — 82607 VITAMIN B-12: CPT | Performed by: INTERNAL MEDICINE

## 2022-07-20 PROCEDURE — 85379 FIBRIN DEGRADATION QUANT: CPT | Performed by: INTERNAL MEDICINE

## 2022-07-20 PROCEDURE — 85652 RBC SED RATE AUTOMATED: CPT | Performed by: INTERNAL MEDICINE

## 2022-07-20 PROCEDURE — 36415 COLL VENOUS BLD VENIPUNCTURE: CPT

## 2022-07-20 PROCEDURE — 82232 ASSAY OF BETA-2 PROTEIN: CPT | Performed by: INTERNAL MEDICINE

## 2022-07-20 PROCEDURE — 83880 ASSAY OF NATRIURETIC PEPTIDE: CPT | Performed by: EMERGENCY MEDICINE

## 2022-07-20 PROCEDURE — U0003 INFECTIOUS AGENT DETECTION BY NUCLEIC ACID (DNA OR RNA); SEVERE ACUTE RESPIRATORY SYNDROME CORONAVIRUS 2 (SARS-COV-2) (CORONAVIRUS DISEASE [COVID-19]), AMPLIFIED PROBE TECHNIQUE, MAKING USE OF HIGH THROUGHPUT TECHNOLOGIES AS DESCRIBED BY CMS-2020-01-R: HCPCS | Performed by: EMERGENCY MEDICINE

## 2022-07-20 PROCEDURE — 83540 ASSAY OF IRON: CPT | Performed by: INTERNAL MEDICINE

## 2022-07-20 PROCEDURE — 93010 ELECTROCARDIOGRAM REPORT: CPT | Performed by: INTERNAL MEDICINE

## 2022-07-20 RX ORDER — ACETAMINOPHEN 325 MG/1
650 TABLET ORAL ONCE
Status: CANCELLED | OUTPATIENT
Start: 2022-07-21 | End: 2022-07-20

## 2022-07-20 RX ORDER — LABETALOL HYDROCHLORIDE 5 MG/ML
10 INJECTION, SOLUTION INTRAVENOUS ONCE
Status: COMPLETED | OUTPATIENT
Start: 2022-07-20 | End: 2022-07-20

## 2022-07-20 RX ORDER — BISACODYL 10 MG
10 SUPPOSITORY, RECTAL RECTAL DAILY PRN
Status: DISCONTINUED | OUTPATIENT
Start: 2022-07-20 | End: 2022-07-30 | Stop reason: HOSPADM

## 2022-07-20 RX ORDER — ACETAMINOPHEN 160 MG/5ML
650 SOLUTION ORAL EVERY 4 HOURS PRN
Status: DISCONTINUED | OUTPATIENT
Start: 2022-07-20 | End: 2022-07-30 | Stop reason: HOSPADM

## 2022-07-20 RX ORDER — ISOSORBIDE MONONITRATE 10 MG/1
10 TABLET ORAL ONCE
Status: COMPLETED | OUTPATIENT
Start: 2022-07-20 | End: 2022-07-20

## 2022-07-20 RX ORDER — SODIUM CHLORIDE 9 MG/ML
50 INJECTION, SOLUTION INTRAVENOUS CONTINUOUS
Status: DISCONTINUED | OUTPATIENT
Start: 2022-07-20 | End: 2022-07-22

## 2022-07-20 RX ORDER — AMLODIPINE BESYLATE 10 MG/1
10 TABLET ORAL DAILY
Qty: 30 TABLET | Refills: 1 | Status: ON HOLD | OUTPATIENT
Start: 2022-07-20 | End: 2022-07-30 | Stop reason: SDUPTHER

## 2022-07-20 RX ORDER — HEPARIN SODIUM 1000 [USP'U]/ML
INJECTION, SOLUTION INTRAVENOUS; SUBCUTANEOUS
Status: ACTIVE
Start: 2022-07-20 | End: 2022-07-21

## 2022-07-20 RX ORDER — SODIUM CHLORIDE 9 MG/ML
250 INJECTION, SOLUTION INTRAVENOUS AS NEEDED
Status: CANCELLED | OUTPATIENT
Start: 2022-07-21

## 2022-07-20 RX ORDER — FUROSEMIDE 10 MG/ML
160 INJECTION INTRAMUSCULAR; INTRAVENOUS ONCE
Status: DISCONTINUED | OUTPATIENT
Start: 2022-07-20 | End: 2022-07-20

## 2022-07-20 RX ORDER — ACETAMINOPHEN 650 MG/1
650 SUPPOSITORY RECTAL EVERY 4 HOURS PRN
Status: DISCONTINUED | OUTPATIENT
Start: 2022-07-20 | End: 2022-07-30 | Stop reason: HOSPADM

## 2022-07-20 RX ORDER — HEPARIN SODIUM 5000 [USP'U]/ML
10000 INJECTION, SOLUTION INTRAVENOUS; SUBCUTANEOUS ONCE
Status: DISCONTINUED | OUTPATIENT
Start: 2022-07-20 | End: 2022-07-30 | Stop reason: HOSPADM

## 2022-07-20 RX ORDER — ALBUMIN, HUMAN INJ 5% 5 %
500 SOLUTION INTRAVENOUS ONCE
Status: CANCELLED | OUTPATIENT
Start: 2022-07-20

## 2022-07-20 RX ORDER — CLONIDINE HYDROCHLORIDE 0.1 MG/1
0.2 TABLET ORAL ONCE
Status: DISCONTINUED | OUTPATIENT
Start: 2022-07-20 | End: 2022-07-20 | Stop reason: HOSPADM

## 2022-07-20 RX ORDER — DIPHENHYDRAMINE HYDROCHLORIDE 50 MG/ML
25 INJECTION INTRAMUSCULAR; INTRAVENOUS AS NEEDED
Status: COMPLETED | OUTPATIENT
Start: 2022-07-20 | End: 2022-07-21

## 2022-07-20 RX ORDER — AMOXICILLIN 250 MG
2 CAPSULE ORAL 2 TIMES DAILY
Status: DISCONTINUED | OUTPATIENT
Start: 2022-07-20 | End: 2022-07-30 | Stop reason: HOSPADM

## 2022-07-20 RX ORDER — CLONIDINE HYDROCHLORIDE 0.1 MG/1
0.2 TABLET ORAL ONCE
Status: COMPLETED | OUTPATIENT
Start: 2022-07-20 | End: 2022-07-20

## 2022-07-20 RX ORDER — ANTICOAGULANT CITRATE DEXTROSE SOLUTION FORMULA A 12.25; 11; 3.65 G/500ML; G/500ML; G/500ML
1000 SOLUTION INTRAVENOUS ONCE
Status: CANCELLED | OUTPATIENT
Start: 2022-07-20 | End: 2022-07-20

## 2022-07-20 RX ORDER — CALCIUM CARBONATE 200(500)MG
2 TABLET,CHEWABLE ORAL 2 TIMES DAILY PRN
Status: DISCONTINUED | OUTPATIENT
Start: 2022-07-20 | End: 2022-07-30 | Stop reason: HOSPADM

## 2022-07-20 RX ORDER — LIDOCAINE HYDROCHLORIDE 10 MG/ML
10 INJECTION, SOLUTION INFILTRATION; PERINEURAL ONCE
Status: DISCONTINUED | OUTPATIENT
Start: 2022-07-20 | End: 2022-07-30 | Stop reason: HOSPADM

## 2022-07-20 RX ORDER — ONDANSETRON 2 MG/ML
4 INJECTION INTRAMUSCULAR; INTRAVENOUS EVERY 6 HOURS PRN
Status: DISCONTINUED | OUTPATIENT
Start: 2022-07-20 | End: 2022-07-30 | Stop reason: HOSPADM

## 2022-07-20 RX ORDER — SODIUM CHLORIDE 0.9 % (FLUSH) 0.9 %
10 SYRINGE (ML) INJECTION AS NEEDED
Status: DISCONTINUED | OUTPATIENT
Start: 2022-07-20 | End: 2022-07-30 | Stop reason: HOSPADM

## 2022-07-20 RX ORDER — HYDROCODONE BITARTRATE AND ACETAMINOPHEN 5; 325 MG/1; MG/1
1 TABLET ORAL ONCE
Status: COMPLETED | OUTPATIENT
Start: 2022-07-20 | End: 2022-07-20

## 2022-07-20 RX ORDER — ANTICOAGULANT CITRATE DEXTROSE SOLUTION FORMULA A 12.25; 11; 3.65 G/500ML; G/500ML; G/500ML
1000 SOLUTION INTRAVENOUS ONCE
Status: COMPLETED | OUTPATIENT
Start: 2022-07-20 | End: 2022-07-21

## 2022-07-20 RX ORDER — POLYETHYLENE GLYCOL 3350 17 G/17G
17 POWDER, FOR SOLUTION ORAL DAILY PRN
Status: DISCONTINUED | OUTPATIENT
Start: 2022-07-20 | End: 2022-07-30 | Stop reason: HOSPADM

## 2022-07-20 RX ORDER — CLONIDINE HYDROCHLORIDE 0.1 MG/1
0.1 TABLET ORAL EVERY 8 HOURS PRN
Status: DISCONTINUED | OUTPATIENT
Start: 2022-07-20 | End: 2022-07-30 | Stop reason: HOSPADM

## 2022-07-20 RX ORDER — ACETAMINOPHEN 325 MG/1
650 TABLET ORAL EVERY 4 HOURS PRN
Status: DISCONTINUED | OUTPATIENT
Start: 2022-07-20 | End: 2022-07-30 | Stop reason: HOSPADM

## 2022-07-20 RX ORDER — DIPHENHYDRAMINE HCL 25 MG
25 CAPSULE ORAL AS NEEDED
Status: COMPLETED | OUTPATIENT
Start: 2022-07-20 | End: 2022-07-21

## 2022-07-20 RX ORDER — BISACODYL 5 MG/1
5 TABLET, DELAYED RELEASE ORAL DAILY PRN
Status: DISCONTINUED | OUTPATIENT
Start: 2022-07-20 | End: 2022-07-30 | Stop reason: HOSPADM

## 2022-07-20 RX ORDER — NITROGLYCERIN 0.4 MG/1
0.4 TABLET SUBLINGUAL
Status: DISCONTINUED | OUTPATIENT
Start: 2022-07-20 | End: 2022-07-30 | Stop reason: HOSPADM

## 2022-07-20 RX ORDER — ONDANSETRON 4 MG/1
4 TABLET, FILM COATED ORAL EVERY 6 HOURS PRN
Status: DISCONTINUED | OUTPATIENT
Start: 2022-07-20 | End: 2022-07-30 | Stop reason: HOSPADM

## 2022-07-20 RX ORDER — DIPHENHYDRAMINE HCL 25 MG
25 CAPSULE ORAL ONCE
Status: CANCELLED | OUTPATIENT
Start: 2022-07-21 | End: 2022-07-20

## 2022-07-20 RX ORDER — ACETAMINOPHEN 325 MG/1
650 TABLET ORAL ONCE
Status: COMPLETED | OUTPATIENT
Start: 2022-07-20 | End: 2022-07-20

## 2022-07-20 RX ADMIN — ACETAMINOPHEN 650 MG: 325 TABLET, FILM COATED ORAL at 21:02

## 2022-07-20 RX ADMIN — ISOSORBIDE MONONITRATE 10 MG: 10 TABLET ORAL at 16:36

## 2022-07-20 RX ADMIN — CLONIDINE HYDROCHLORIDE 0.2 MG: 0.1 TABLET ORAL at 10:27

## 2022-07-20 RX ADMIN — LABETALOL HYDROCHLORIDE 10 MG: 5 INJECTION, SOLUTION INTRAVENOUS at 15:54

## 2022-07-20 RX ADMIN — FUROSEMIDE 160 MG: 10 INJECTION, SOLUTION INTRAMUSCULAR; INTRAVENOUS at 20:10

## 2022-07-20 RX ADMIN — HYDROCODONE BITARTRATE AND ACETAMINOPHEN 1 TABLET: 5; 325 TABLET ORAL at 15:53

## 2022-07-20 RX ADMIN — DOCUSATE SODIUM 50MG AND SENNOSIDES 8.6MG 2 TABLET: 8.6; 5 TABLET, FILM COATED ORAL at 22:54

## 2022-07-20 NOTE — PROGRESS NOTES
"    .     REASON FOR CONSULTATION:     Provide an opinion on any further workup or treatment of iron deficiency anemia.                             REQUESTING PHYSICIAN: Winnie Sanchez MD     RECORDS OBTAINED:  Records of the patient's history including those obtained from the referring provider were reviewed and summarized in detail.    HISTORY OF PRESENT ILLNESS:  The patient is a 32 y.o. year old female with chronic iron deficiency anemia presenting for initial evaluation, accompanied by her grandmother.  Patient has very poor hearing because of chronic otorrhea since childhood.  She has active drainage from the left ear, however she denies any fever sweating or chills.    My assistant did find the patient had elevated blood pressure today 178/126.  She had a normal blood pressure on 7/11/2022 at 140/82.  She does have lightheadedness and fatigue.  She also complains of abdomen discomfort.  She also complains some cough which causes nausea, no vomiting.  No sputum production.  Again she denies fever sweating chills.  She also continues to have active drainage of the left ear, which she described this 1 since childhood time.  This is confirmed by her grandmother.  I noticed the patient had Augmentin prescribed on 7/15/2022 however patient reports she did not receive the medicine and has not taking any antibiotics.  According to medical records, she had a telemedicine visit on 7/15/2022 because of \"acute nonrecurrent pansinusitis \".     In terms for her anemia, patient reports she was taking oral iron once a day for about a 3-month however without improvement.  So she subsequently stopped oral iron about a month and a half ago.  Patient reports no excessive menstrual bleeding.  She has regular volume.  She also denies other bleeding.    I reviewed her previous laboratory results, the earliest available was from 6/25/2021 which reported microcytic hypochromic anemia with a hemoglobin 7.2, MCV 63.2 MCHC 26.6.  She " had a vitamin D deficiency 25 hydroxy vitamin D at  8.2 ng/mL, and the unremarkable CMP with a creatinine 0.77 and the normal liver function panel.  The next day on 6/26/2021, patient had a iron level 14 however without a ferritin or iron saturation.     Laboratory studies on 2/2/2022 reported free iron of 12, TIBC 390, iron saturation 3% without a ferritin level.  Vitamin D level was 11.8 ng/mL.  No CBC study results or CMP.    The next laboratory study available was from 4/21/2022 which reported hemoglobin 7.9, MCV 64, MCHC 27.9.  WBC 3900 without differentiation and platelets 272,000.  She had a vitamin B12 level at 464 pg/mL, folate 11.7 ng/mL, C-reactive protein 6 mg/L which is normal, and ferritin 21 ng/mL, free iron 17, iron saturation 6% and a TIBC 290.  She had a normal TSH, and the hemoglobin electrophoresis was normal, reporting hemoglobin 897.8%, and a hemoglobin A2 at 2.2% without evidence of hemoglobin S or hemoglobin F.  Chemistry lab reported normal renal function creatinine 0.81, normal liver function panel, marginally low calcium 8.5, she had normal electrolytes otherwise, total protein 7.1, albumin 3.0.     Today in our clinic, patient had laboratory study reporting worsening anemia with a hemoglobin 6.2, MCV 65.3 MCHC 30.0, and a newly developed thrombocytopenia with platelets 79,000.  She has normal WBC 6780 including neutrophils 3870 lymphocytes 2080 monocytes 700.       Past Medical History:   Diagnosis Date   • History of anemia    • Other specified nutritional anemias      Past Surgical History:   Procedure Laterality Date   • NO PAST SURGERIES         MEDICATIONS    Current Outpatient Medications:   •  amoxicillin-clavulanate (Augmentin) 875-125 MG per tablet, Take 1 tablet by mouth 2 (Two) Times a Day for 10 days., Disp: 20 tablet, Rfl: 0  •  chlorhexidine (PERIDEX) 0.12 % solution, RINSE TWICE DAILY AS DIRECTED BY YOUR DENTIST, Disp: , Rfl:   •  Ferrous Fumarate-Folic Acid 324-1 MG  tablet, Take 1 tablet by mouth Daily., Disp: 90 each, Rfl: 1  •  ferrous gluconate (FERGON) 324 MG tablet, Take 324 mg by mouth Daily With Breakfast., Disp: , Rfl:   •  fluticasone (Flonase) 50 MCG/ACT nasal spray, 2 sprays into the nostril(s) as directed by provider Daily., Disp: 16 g, Rfl: 2  •  ibuprofen (ADVIL,MOTRIN) 800 MG tablet, TAKE 1 TABLET BY MOUTH EVERY 6-8 HOURS AS NEEDED FOR PAIN, Disp: , Rfl:   •  loratadine (Claritin) 10 MG tablet, Take 1 tablet by mouth Daily., Disp: 90 tablet, Rfl: 1  •  methylPREDNISolone (MEDROL) 4 MG dose pack, Take as directed on package instructions., Disp: 1 each, Rfl: 0  •  ondansetron ODT (Zofran ODT) 4 MG disintegrating tablet, Place 1 tablet on the tongue Every 8 (Eight) Hours As Needed for Nausea or Vomiting., Disp: 12 tablet, Rfl: 0  •  vitamin D (ERGOCALCIFEROL) 1.25 MG (31886 UT) capsule capsule, Take 1 capsule by mouth 1 (One) Time Per Week., Disp: 12 capsule, Rfl: 1  •  amLODIPine (NORVASC) 10 MG tablet, Take 1 tablet by mouth Daily., Disp: 30 tablet, Rfl: 1    Current Facility-Administered Medications:   •  cloNIDine (CATAPRES) tablet 0.2 mg, 0.2 mg, Oral, Once, Norberto Almaraz MD PhD    ALLERGIES:   No Known Allergies    SOCIAL HISTORY:       Social History     Socioeconomic History   • Marital status: Single   Tobacco Use   • Smoking status: Former Smoker   Vaping Use   • Vaping Use: Never used   Substance and Sexual Activity   • Alcohol use: Yes     Comment: social   • Drug use: Never   • Sexual activity: Not Currently     Partners: Male     Birth control/protection: None         FAMILY HISTORY:  Family History   Problem Relation Age of Onset   • Autoimmune disease Mother    • Anemia Mother    • Anemia Brother    • Sickle cell anemia Cousin        REVIEW OF SYSTEMS:  Review of Systems   Constitutional: Positive for fatigue. Negative for activity change, appetite change, diaphoresis, fever and unexpected weight change.   HENT: Positive for hearing loss (Chronic  "otorrhea). Negative for rhinorrhea and sore throat.    Respiratory: Positive for cough. Negative for shortness of breath.    Cardiovascular: Positive for leg swelling (Left lower leg/ankle for about a week). Negative for chest pain and palpitations.   Gastrointestinal: Positive for nausea. Negative for abdominal pain, blood in stool, diarrhea and vomiting.   Endocrine: Positive for cold intolerance.   Genitourinary: Positive for vaginal bleeding (Regular menses). Negative for dysuria and hematuria.   Musculoskeletal: Positive for joint swelling (Left ankle). Negative for arthralgias.   Skin: Negative for color change and rash.   Allergic/Immunologic: Negative for immunocompromised state.   Neurological: Negative for numbness and headaches.   Hematological: Negative for adenopathy. Does not bruise/bleed easily.   Psychiatric/Behavioral: Negative for confusion.              Vitals:    07/20/22 0856   BP: (!) 178/126  Comment: pt BP was taken twice first BP was 194/148   Pulse: 95   Resp: 18   Temp: 97.8 °F (36.6 °C)   TempSrc: Temporal   SpO2: 100%   Weight: 66.9 kg (147 lb 6.4 oz)   Height: 165.1 cm (65\")   PainSc:   6   PainLoc: Abdomen     Current Status 7/20/2022   ECOG score 0      PHYSICAL EXAM:      CONSTITUTIONAL:  Vital signs reviewed.  Well-developed well-nourished -American female.  No distress, looks comfortable.  EYES:  Conjunctivae and lids unremarkable.    EARS,NOSE,MOUTH,THROAT:  Ears and nose appear unremarkable. Patient wears mask due to the pandemic coronavirus infection.   RESPIRATORY:  Normal respiratory effort.  Lungs clear to auscultation bilaterally.  CARDIOVASCULAR: Regular rhythm and rate.  Normal S1, S2.  No murmurs.    GASTROINTESTINAL: Abdomen appears unremarkable.  Nontender.  No hepatomegaly.  No splenomegaly.  LYMPHATIC:  No cervical, supraclavicular, axillary lymphadenopathy.  MUSCULOSKELETAL:  Unremarkable gait and station.  Left leg no 1+ edema in the lower leg and the ankle.  " Trace edema right leg.  SKIN:  Warm.  No rashes.  PSYCHIATRIC: Normal mood and affect.      RECENT LABS:        WBC   Date Value Ref Range Status   07/20/2022 6.78 3.40 - 10.80 10*3/mm3 Final   04/21/2022 3.9 3.4 - 10.8 x10E3/uL Final   06/25/2021 3.63 3.40 - 10.80 10*3/mm3 Final     Hemoglobin   Date Value Ref Range Status   07/20/2022 6.2 (C) 12.0 - 15.9 g/dL Final   04/21/2022 7.9 (L) 11.1 - 15.9 g/dL Final   06/25/2021 7.2 (L) 12.0 - 15.9 g/dL Final     Platelets   Date Value Ref Range Status   07/20/2022 79 (L) 140 - 450 10*3/mm3 Final   04/21/2022 272 150 - 450 x10E3/uL Final   06/25/2021 240 140 - 450 10*3/mm3 Final       Assessment & Plan      *Chronic iron deficiency anemia.  Patient has been anemia at least for 1 year I do not have other results available.  The earliest the study was from 6/26/2021    When she had hemoglobin 7.4, MCV 57.2 and MCHC 29.9.  She had a platelets 223,000 and WBC 4300.  Iron study reported free iron 14 mcg/dL, however no iron saturation, normal ferritin level.  Patient reports she has been taking oral iron once a day with good tolerance but only for 3 months, she stopped that about a month and a half ago because no improvement of her anemia.     Her iron study on 4/21/2022 reported iron saturation 6% with a free iron 17 but without a ferritin level.  She had normal folate and a low normal B12 level.  She also had hemoglobin electrophoresis, no evidence for sickle cell trait.    Today laboratory study reported worsening microcytic hypochromic anemia.  I reviewed her peripheral blood smear, with anisocytosis, with jose de jesus shaped RBCs, target cells, and the rare schistocytes.  No sickle cells.    Today on 7/20/2022 patient has worsening anemia Hb 7.2, and the symptomatic with fatigue.  I recommended transfusing 2 units PRBC today.  I also recommended repeat iron studies and thus also started on ferrous gluconate 324 mg 3 times a day.  She previously tolerated this form of oral iron once  a day.  I cautioned patient if she develops nausea vomiting or constipation associate with increased oral intake, we could give her IV iron therapy.  She voiced understanding.      * Moderate thrombocytopenia.  This is new, we do not know the etiology.  Discussed with the patient, I recommended laboratory studies check vitamin B12 level, IPF, and LDH and CMP to help with evaluation.  Patient is not having bleeding or bruising. Patient reports no new medications, no fever sweating or chills.    *New onset hypertension.  BP today 178/126 for my assistant.  I personally manually measured her BP it was 200/120.  Patient was given clonidine 0.2 mg in clinic, and BP was slightly better at 180/120.  I prescribed it amlodipine 10 mg daily starting today.  Discussed with the patient grandmother, recommend monitoring BP daily at home, and to hold anticoagulation medicine if her BP improved.      Plan:  1. Laboratory study today as mentioned:  - Folate  - Vitamin B12  - Ferritin  - Iron Profile  - Lactate Dehydrogenase  - Comprehensive Metabolic Panel  - Immature Platelet Fraction  - Ambulatory Referral to [facility] For Infusion Treatment  - Type and screen  - Ferritin  - Iron Profile  - CBC & Differential    2. Type of crossmatch and transfuse 2 units PRBC.  3. Start oral ferrous gluconate 324 mg 3 times a day.  I E scribed to her pharmacy.  4. I will bring patient back for reevaluation in about 1 month, we will repeat a CBC and iron studies.      I reviewed outside medical records laboratory study results, and also reviewed her peripheral blood smear today.  Discussed with the patient and especially her grandmother about a further evaluation and management plan.  Patient has poor hearing due to chronic otorrhea.  She is actually going to see another physician later today in afternoon for evaluation.      Addendum:  Glucose   Date Value Ref Range Status   07/20/2022 87 74 - 124 mg/dL Final     BUN   Date Value Ref Range Status    07/20/2022 54 (H) 6 - 20 mg/dL Final     Creatinine   Date Value Ref Range Status   07/20/2022 7.74 (C) 0.60 - 1.10 mg/dL Final     Sodium   Date Value Ref Range Status   07/20/2022 135 134 - 145 mmol/L Final     Potassium   Date Value Ref Range Status   07/20/2022 5.2 (H) 3.5 - 4.7 mmol/L Final     Chloride   Date Value Ref Range Status   07/20/2022 105 98 - 107 mmol/L Final     CO2   Date Value Ref Range Status   07/20/2022 17.2 (L) 22.0 - 29.0 mmol/L Final     Calcium   Date Value Ref Range Status   07/20/2022 8.4 (L) 8.5 - 10.2 mg/dL Final     Total Protein   Date Value Ref Range Status   07/20/2022 6.9 6.3 - 8.0 g/dL Final     Albumin   Date Value Ref Range Status   07/20/2022 2.40 (L) 3.50 - 5.20 g/dL Final     ALT (SGPT)   Date Value Ref Range Status   07/20/2022 6 0 - 33 U/L Final     AST (SGOT)   Date Value Ref Range Status   07/20/2022 16 0 - 32 U/L Final     Alkaline Phosphatase   Date Value Ref Range Status   07/20/2022 38 38 - 116 U/L Final     Total Bilirubin   Date Value Ref Range Status   07/20/2022 0.3 0.2 - 1.2 mg/dL Final     A/G Ratio   Date Value Ref Range Status   04/21/2022 0.7 (L) 1.2 - 2.2 Final     BUN/Creatinine Ratio   Date Value Ref Range Status   07/20/2022 7.0 (L) 7.3 - 30.0 Final     Anion Gap   Date Value Ref Range Status   07/20/2022 12.8 5.0 - 15.0 mmol/L Final       After patient left, this afternoon I received lab reports for her CMP which reported significant elevated creatinine level.  My lab staff repeated study and this showed similar results with a creatinine 7.74.  This patient also has elevated globulin.    I called and spoke with her grandmother, ask her to bring patient back to [facility] emergency room for evaluation.    I also notified emergency room triage nurse.     Could this patient having multiple myeloma on top of her iron deficiency ?    CLAUDIO SHAH M.D., Ph.D.     7/20/2022.      CC:  Winnie Sanchez MD

## 2022-07-21 ENCOUNTER — APPOINTMENT (OUTPATIENT)
Dept: GENERAL RADIOLOGY | Facility: HOSPITAL | Age: 32
End: 2022-07-21

## 2022-07-21 ENCOUNTER — APPOINTMENT (OUTPATIENT)
Dept: CARDIOLOGY | Facility: HOSPITAL | Age: 32
End: 2022-07-21

## 2022-07-21 PROBLEM — M31.19 T.T.P. SYNDROME (HCC): Status: ACTIVE | Noted: 2022-07-21

## 2022-07-21 LAB
ALBUMIN SERPL-MCNC: 2.7 G/DL (ref 3.5–5.2)
ALBUMIN/GLOB SERPL: 0.9 G/DL
ALP SERPL-CCNC: 44 U/L (ref 39–117)
ALT SERPL W P-5'-P-CCNC: 11 U/L (ref 1–33)
ANION GAP SERPL CALCULATED.3IONS-SCNC: 11 MMOL/L (ref 5–15)
AORTIC DIMENSIONLESS INDEX: 0.7 (DI)
AST SERPL-CCNC: 17 U/L (ref 1–32)
B PARAPERT DNA SPEC QL NAA+PROBE: NOT DETECTED
B PERT DNA SPEC QL NAA+PROBE: NOT DETECTED
BH BB BLOOD EXPIRATION DATE: NORMAL
BH BB BLOOD TYPE BARCODE: 5100
BH BB DISPENSE STATUS: NORMAL
BH BB PRODUCT CODE: NORMAL
BH BB UNIT NUMBER: NORMAL
BH CV ECHO LEFT VENTRICLE GLOBAL LONGITUDINAL STRAIN: -14.2 %
BH CV ECHO MEAS - AO MAX PG: 5.9 MMHG
BH CV ECHO MEAS - AO MEAN PG: 3.5 MMHG
BH CV ECHO MEAS - AO ROOT DIAM: 3.3 CM
BH CV ECHO MEAS - AO V2 MAX: 121.6 CM/SEC
BH CV ECHO MEAS - AO V2 VTI: 20.7 CM
BH CV ECHO MEAS - AVA(I,D): 1.74 CM2
BH CV ECHO MEAS - EDV(CUBED): 71.7 ML
BH CV ECHO MEAS - EDV(MOD-SP2): 98 ML
BH CV ECHO MEAS - EDV(MOD-SP4): 87 ML
BH CV ECHO MEAS - EF(MOD-BP): 61.6 %
BH CV ECHO MEAS - EF(MOD-SP2): 59.2 %
BH CV ECHO MEAS - EF(MOD-SP4): 62.1 %
BH CV ECHO MEAS - ESV(CUBED): 18.2 ML
BH CV ECHO MEAS - ESV(MOD-SP2): 40 ML
BH CV ECHO MEAS - ESV(MOD-SP4): 33 ML
BH CV ECHO MEAS - FS: 36.7 %
BH CV ECHO MEAS - IVS/LVPW: 1.15 CM
BH CV ECHO MEAS - IVSD: 2.03 CM
BH CV ECHO MEAS - LAT PEAK E' VEL: 3 CM/SEC
BH CV ECHO MEAS - LV DIASTOLIC VOL/BSA (35-75): 49.6 CM2
BH CV ECHO MEAS - LV MASS(C)D: 360.8 GRAMS
BH CV ECHO MEAS - LV MAX PG: 3.7 MMHG
BH CV ECHO MEAS - LV MEAN PG: 2.11 MMHG
BH CV ECHO MEAS - LV SYSTOLIC VOL/BSA (12-30): 18.8 CM2
BH CV ECHO MEAS - LV V1 MAX: 96.8 CM/SEC
BH CV ECHO MEAS - LV V1 VTI: 15.5 CM
BH CV ECHO MEAS - LVIDD: 4.2 CM
BH CV ECHO MEAS - LVIDS: 2.6 CM
BH CV ECHO MEAS - LVOT AREA: 2.32 CM2
BH CV ECHO MEAS - LVOT DIAM: 1.72 CM
BH CV ECHO MEAS - LVPWD: 1.77 CM
BH CV ECHO MEAS - MED PEAK E' VEL: 4.1 CM/SEC
BH CV ECHO MEAS - MV A DUR: 0.11 SEC
BH CV ECHO MEAS - MV A MAX VEL: 75.8 CM/SEC
BH CV ECHO MEAS - MV DEC SLOPE: 462.6 CM/SEC2
BH CV ECHO MEAS - MV DEC TIME: 102 MSEC
BH CV ECHO MEAS - MV E MAX VEL: 50.7 CM/SEC
BH CV ECHO MEAS - MV E/A: 0.67
BH CV ECHO MEAS - MV MAX PG: 3.1 MMHG
BH CV ECHO MEAS - MV MEAN PG: 1.5 MMHG
BH CV ECHO MEAS - MV P1/2T: 42.9 MSEC
BH CV ECHO MEAS - MV V2 VTI: 15.5 CM
BH CV ECHO MEAS - MVA(P1/2T): 5.1 CM2
BH CV ECHO MEAS - MVA(VTI): 2.32 CM2
BH CV ECHO MEAS - PA ACC TIME: 0.13 SEC
BH CV ECHO MEAS - PA PR(ACCEL): 22 MMHG
BH CV ECHO MEAS - PA V2 MAX: 129 CM/SEC
BH CV ECHO MEAS - QP/QS: 1.58
BH CV ECHO MEAS - RAP SYSTOLE: 8 MMHG
BH CV ECHO MEAS - RV MAX PG: 6.3 MMHG
BH CV ECHO MEAS - RV V1 MAX: 125.5 CM/SEC
BH CV ECHO MEAS - RV V1 VTI: 21.5 CM
BH CV ECHO MEAS - RVOT DIAM: 1.83 CM
BH CV ECHO MEAS - RVSP: 27.7 MMHG
BH CV ECHO MEAS - SI(MOD-SP2): 33.1 ML/M2
BH CV ECHO MEAS - SI(MOD-SP4): 30.8 ML/M2
BH CV ECHO MEAS - SV(LVOT): 35.9 ML
BH CV ECHO MEAS - SV(MOD-SP2): 58 ML
BH CV ECHO MEAS - SV(MOD-SP4): 54 ML
BH CV ECHO MEAS - SV(RVOT): 56.7 ML
BH CV ECHO MEAS - TAPSE (>1.6): 2.6 CM
BH CV ECHO MEAS - TR MAX PG: 19.7 MMHG
BH CV ECHO MEAS - TR MAX VEL: 222 CM/SEC
BH CV ECHO MEASUREMENTS AVERAGE E/E' RATIO: 14.28
BH CV XLRA - RV BASE: 3.2 CM
BH CV XLRA - RV LENGTH: 8.2 CM
BH CV XLRA - RV MID: 2.42 CM
BH CV XLRA - TDI S': 15.4 CM/SEC
BILIRUB SERPL-MCNC: 0.5 MG/DL (ref 0–1.2)
BUN SERPL-MCNC: 49 MG/DL (ref 6–20)
BUN/CREAT SERPL: 6.6 (ref 7–25)
C PNEUM DNA NPH QL NAA+NON-PROBE: NOT DETECTED
CALCIUM SPEC-SCNC: 9.2 MG/DL (ref 8.6–10.5)
CHLORIDE SERPL-SCNC: 105 MMOL/L (ref 98–107)
CO2 SERPL-SCNC: 21 MMOL/L (ref 22–29)
CREAT SERPL-MCNC: 7.39 MG/DL (ref 0.57–1)
CROSSMATCH INTERPRETATION: NORMAL
CROSSMATCH INTERPRETATION: NORMAL
CRP SERPL-MCNC: 2.41 MG/DL (ref 0–0.5)
DEPRECATED RDW RBC AUTO: 61.6 FL (ref 37–54)
EGFRCR SERPLBLD CKD-EPI 2021: 7.1 ML/MIN/1.73
ERYTHROCYTE [DISTWIDTH] IN BLOOD BY AUTOMATED COUNT: 24.6 % (ref 12.3–15.4)
FIBRINOGEN PPP-MCNC: 287 MG/DL (ref 219–464)
FLUAV SUBTYP SPEC NAA+PROBE: NOT DETECTED
FLUBV RNA ISLT QL NAA+PROBE: NOT DETECTED
GLOBULIN UR ELPH-MCNC: 2.9 GM/DL
GLUCOSE SERPL-MCNC: 89 MG/DL (ref 65–99)
HADV DNA SPEC NAA+PROBE: NOT DETECTED
HAPTOGLOB SERPL-MCNC: 37 MG/DL (ref 30–200)
HCOV 229E RNA SPEC QL NAA+PROBE: NOT DETECTED
HCOV HKU1 RNA SPEC QL NAA+PROBE: NOT DETECTED
HCOV NL63 RNA SPEC QL NAA+PROBE: NOT DETECTED
HCOV OC43 RNA SPEC QL NAA+PROBE: NOT DETECTED
HCT VFR BLD AUTO: 24.5 % (ref 34–46.6)
HGB BLD-MCNC: 7.6 G/DL (ref 12–15.9)
HMPV RNA NPH QL NAA+NON-PROBE: NOT DETECTED
HPIV1 RNA ISLT QL NAA+PROBE: NOT DETECTED
HPIV2 RNA SPEC QL NAA+PROBE: NOT DETECTED
HPIV3 RNA NPH QL NAA+PROBE: NOT DETECTED
HPIV4 P GENE NPH QL NAA+PROBE: NOT DETECTED
LDH SERPL-CCNC: 312 U/L (ref 135–214)
LEFT ATRIUM VOLUME INDEX: 36.9 ML/M2
LYMPHOCYTES # BLD MANUAL: 0.52 10*3/MM3 (ref 0.7–3.1)
LYMPHOCYTES NFR BLD MANUAL: 5 % (ref 5–12)
M PNEUMO IGG SER IA-ACNC: NOT DETECTED
MAXIMAL PREDICTED HEART RATE: 190 BPM
MCH RBC QN AUTO: 22.5 PG (ref 26.6–33)
MCHC RBC AUTO-ENTMCNC: 31 G/DL (ref 31.5–35.7)
MCV RBC AUTO: 72.5 FL (ref 79–97)
MONOCYTES # BLD: 0.26 10*3/MM3 (ref 0.1–0.9)
NEUTROPHILS # BLD AUTO: 4.43 10*3/MM3 (ref 1.7–7)
NEUTROPHILS NFR BLD MANUAL: 85 % (ref 42.7–76)
PLAT MORPH BLD: NORMAL
PLATELET # BLD AUTO: 74 10*3/MM3 (ref 140–450)
POTASSIUM SERPL-SCNC: 5.2 MMOL/L (ref 3.5–5.2)
PROT SERPL-MCNC: 5.6 G/DL (ref 6–8.5)
RBC # BLD AUTO: 3.38 10*6/MM3 (ref 3.77–5.28)
RBC MORPH BLD: NORMAL
RETICS # AUTO: 0.07 10*6/MM3 (ref 0.02–0.13)
RETICS/RBC NFR AUTO: 1.63 % (ref 0.7–1.9)
RHINOVIRUS RNA SPEC NAA+PROBE: NOT DETECTED
RSV RNA NPH QL NAA+NON-PROBE: NOT DETECTED
SARS-COV-2 RNA NPH QL NAA+NON-PROBE: NOT DETECTED
SODIUM SERPL-SCNC: 137 MMOL/L (ref 136–145)
STRESS TARGET HR: 162 BPM
UNIT  ABO: NORMAL
UNIT  RH: NORMAL
VARIANT LYMPHS NFR BLD MANUAL: 10 % (ref 19.6–45.3)
WBC MORPH BLD: NORMAL
WBC NRBC COR # BLD: 5.21 10*3/MM3 (ref 3.4–10.8)

## 2022-07-21 PROCEDURE — 85384 FIBRINOGEN ACTIVITY: CPT | Performed by: INTERNAL MEDICINE

## 2022-07-21 PROCEDURE — P9059 PLASMA, FRZ BETWEEN 8-24HOUR: HCPCS

## 2022-07-21 PROCEDURE — 80053 COMPREHEN METABOLIC PANEL: CPT | Performed by: INTERNAL MEDICINE

## 2022-07-21 PROCEDURE — 85025 COMPLETE CBC W/AUTO DIFF WBC: CPT | Performed by: INTERNAL MEDICINE

## 2022-07-21 PROCEDURE — 25010000002 CALCIUM GLUCONATE PER 10 ML: Performed by: INTERNAL MEDICINE

## 2022-07-21 PROCEDURE — 93356 MYOCRD STRAIN IMG SPCKL TRCK: CPT

## 2022-07-21 PROCEDURE — 83520 IMMUNOASSAY QUANT NOS NONAB: CPT | Performed by: INTERNAL MEDICINE

## 2022-07-21 PROCEDURE — 93306 TTE W/DOPPLER COMPLETE: CPT

## 2022-07-21 PROCEDURE — 85007 BL SMEAR W/DIFF WBC COUNT: CPT | Performed by: INTERNAL MEDICINE

## 2022-07-21 PROCEDURE — 63710000001 PREDNISONE PER 5 MG: Performed by: INTERNAL MEDICINE

## 2022-07-21 PROCEDURE — 87205 SMEAR GRAM STAIN: CPT | Performed by: INTERNAL MEDICINE

## 2022-07-21 PROCEDURE — 87147 CULTURE TYPE IMMUNOLOGIC: CPT | Performed by: INTERNAL MEDICINE

## 2022-07-21 PROCEDURE — 86927 PLASMA FRESH FROZEN: CPT

## 2022-07-21 PROCEDURE — P9017 PLASMA 1 DONOR FRZ W/IN 8 HR: HCPCS

## 2022-07-21 PROCEDURE — 93306 TTE W/DOPPLER COMPLETE: CPT | Performed by: INTERNAL MEDICINE

## 2022-07-21 PROCEDURE — 86140 C-REACTIVE PROTEIN: CPT | Performed by: INTERNAL MEDICINE

## 2022-07-21 PROCEDURE — 84166 PROTEIN E-PHORESIS/URINE/CSF: CPT | Performed by: INTERNAL MEDICINE

## 2022-07-21 PROCEDURE — 86335 IMMUNFIX E-PHORSIS/URINE/CSF: CPT | Performed by: INTERNAL MEDICINE

## 2022-07-21 PROCEDURE — 84156 ASSAY OF PROTEIN URINE: CPT | Performed by: INTERNAL MEDICINE

## 2022-07-21 PROCEDURE — 93356 MYOCRD STRAIN IMG SPCKL TRCK: CPT | Performed by: INTERNAL MEDICINE

## 2022-07-21 PROCEDURE — 81050 URINALYSIS VOLUME MEASURE: CPT | Performed by: INTERNAL MEDICINE

## 2022-07-21 PROCEDURE — 0202U NFCT DS 22 TRGT SARS-COV-2: CPT | Performed by: INTERNAL MEDICINE

## 2022-07-21 PROCEDURE — 71046 X-RAY EXAM CHEST 2 VIEWS: CPT

## 2022-07-21 PROCEDURE — 36514 APHERESIS PLASMA: CPT

## 2022-07-21 PROCEDURE — 83010 ASSAY OF HAPTOGLOBIN QUANT: CPT | Performed by: INTERNAL MEDICINE

## 2022-07-21 PROCEDURE — 83615 LACTATE (LD) (LDH) ENZYME: CPT | Performed by: INTERNAL MEDICINE

## 2022-07-21 PROCEDURE — 87070 CULTURE OTHR SPECIMN AEROBIC: CPT | Performed by: INTERNAL MEDICINE

## 2022-07-21 PROCEDURE — 85045 AUTOMATED RETICULOCYTE COUNT: CPT | Performed by: INTERNAL MEDICINE

## 2022-07-21 PROCEDURE — 63710000001 DIPHENHYDRAMINE PER 50 MG: Performed by: INTERNAL MEDICINE

## 2022-07-21 PROCEDURE — 25010000002 HYDROCORTISONE SODIUM SUCCINATE 100 MG RECONSTITUTED SOLUTION: Performed by: INTERNAL MEDICINE

## 2022-07-21 PROCEDURE — 25010000002 DIPHENHYDRAMINE PER 50 MG: Performed by: INTERNAL MEDICINE

## 2022-07-21 RX ORDER — CLONIDINE HYDROCHLORIDE 0.1 MG/1
0.2 TABLET ORAL EVERY 12 HOURS SCHEDULED
Status: DISCONTINUED | OUTPATIENT
Start: 2022-07-21 | End: 2022-07-22

## 2022-07-21 RX ORDER — NIFEDIPINE 30 MG/1
30 TABLET, EXTENDED RELEASE ORAL
Status: DISCONTINUED | OUTPATIENT
Start: 2022-07-21 | End: 2022-07-22

## 2022-07-21 RX ORDER — FAMOTIDINE 10 MG/ML
20 INJECTION, SOLUTION INTRAVENOUS DAILY
Status: DISCONTINUED | OUTPATIENT
Start: 2022-07-21 | End: 2022-07-24

## 2022-07-21 RX ORDER — ANTICOAGULANT CITRATE DEXTROSE SOLUTION FORMULA A 12.25; 11; 3.65 G/500ML; G/500ML; G/500ML
1000 SOLUTION INTRAVENOUS DAILY
Status: DISCONTINUED | OUTPATIENT
Start: 2022-07-21 | End: 2022-07-23

## 2022-07-21 RX ORDER — DIPHENHYDRAMINE HYDROCHLORIDE 50 MG/ML
25 INJECTION INTRAMUSCULAR; INTRAVENOUS AS NEEDED
Status: COMPLETED | OUTPATIENT
Start: 2022-07-21 | End: 2022-07-22

## 2022-07-21 RX ORDER — DIPHENHYDRAMINE HCL 25 MG
25 CAPSULE ORAL AS NEEDED
Status: COMPLETED | OUTPATIENT
Start: 2022-07-21 | End: 2022-07-22

## 2022-07-21 RX ORDER — LABETALOL HYDROCHLORIDE 5 MG/ML
20 INJECTION, SOLUTION INTRAVENOUS EVERY 4 HOURS PRN
Status: DISCONTINUED | OUTPATIENT
Start: 2022-07-21 | End: 2022-07-30 | Stop reason: HOSPADM

## 2022-07-21 RX ORDER — AMLODIPINE BESYLATE 5 MG/1
5 TABLET ORAL ONCE
Status: COMPLETED | OUTPATIENT
Start: 2022-07-21 | End: 2022-07-21

## 2022-07-21 RX ADMIN — CALCIUM GLUCONATE 5 G: 98 INJECTION, SOLUTION INTRAVENOUS at 04:06

## 2022-07-21 RX ADMIN — CLONIDINE HYDROCHLORIDE 0.1 MG: 0.1 TABLET ORAL at 10:10

## 2022-07-21 RX ADMIN — ACETAMINOPHEN 650 MG: 325 TABLET, FILM COATED ORAL at 18:43

## 2022-07-21 RX ADMIN — DIPHENHYDRAMINE HYDROCHLORIDE 25 MG: 50 INJECTION, SOLUTION INTRAMUSCULAR; INTRAVENOUS at 20:59

## 2022-07-21 RX ADMIN — HYDROCORTISONE SODIUM SUCCINATE 50 MG: 100 INJECTION, POWDER, FOR SOLUTION INTRAMUSCULAR; INTRAVENOUS at 18:44

## 2022-07-21 RX ADMIN — FAMOTIDINE 20 MG: 10 INJECTION INTRAVENOUS at 22:18

## 2022-07-21 RX ADMIN — DIPHENHYDRAMINE HYDROCHLORIDE 25 MG: 25 CAPSULE ORAL at 18:43

## 2022-07-21 RX ADMIN — LABETALOL HYDROCHLORIDE 20 MG: 5 INJECTION, SOLUTION INTRAVENOUS at 18:53

## 2022-07-21 RX ADMIN — LABETALOL HYDROCHLORIDE 20 MG: 5 INJECTION, SOLUTION INTRAVENOUS at 13:02

## 2022-07-21 RX ADMIN — CALCIUM GLUCONATE 5 G: 98 INJECTION, SOLUTION INTRAVENOUS at 19:30

## 2022-07-21 RX ADMIN — CLONIDINE HYDROCHLORIDE 0.1 MG: 0.1 TABLET ORAL at 01:11

## 2022-07-21 RX ADMIN — AMLODIPINE BESYLATE 5 MG: 5 TABLET ORAL at 02:04

## 2022-07-21 RX ADMIN — DIPHENHYDRAMINE HYDROCHLORIDE 25 MG: 50 INJECTION, SOLUTION INTRAMUSCULAR; INTRAVENOUS at 06:43

## 2022-07-21 RX ADMIN — CLONIDINE HYDROCHLORIDE 0.2 MG: 0.1 TABLET ORAL at 22:20

## 2022-07-21 RX ADMIN — ANTICOAGULANT CITRATE DEXTROSE SOLUTION FORMULA A 1000 ML: 12.25; 11; 3.65 SOLUTION INTRAVENOUS at 19:30

## 2022-07-21 RX ADMIN — NIFEDIPINE 30 MG: 30 TABLET, FILM COATED, EXTENDED RELEASE ORAL at 13:02

## 2022-07-21 RX ADMIN — SODIUM CHLORIDE 50 ML/HR: 9 INJECTION, SOLUTION INTRAVENOUS at 18:56

## 2022-07-21 RX ADMIN — ANTICOAGULANT CITRATE DEXTROSE SOLUTION FORMULA A 1000 ML: 12.25; 11; 3.65 SOLUTION INTRAVENOUS at 05:19

## 2022-07-21 RX ADMIN — PREDNISONE 60 MG: 50 TABLET ORAL at 18:43

## 2022-07-22 ENCOUNTER — HOSPITAL ENCOUNTER (OUTPATIENT)
Dept: INFUSION THERAPY | Facility: HOSPITAL | Age: 32
Setting detail: INFUSION SERIES
Discharge: HOME | End: 2022-07-22

## 2022-07-22 ENCOUNTER — APPOINTMENT (OUTPATIENT)
Dept: GENERAL RADIOLOGY | Facility: HOSPITAL | Age: 32
End: 2022-07-22

## 2022-07-22 DIAGNOSIS — D64.9 ANEMIA, UNSPECIFIED TYPE: ICD-10-CM

## 2022-07-22 DIAGNOSIS — D50.9 IRON DEFICIENCY ANEMIA, UNSPECIFIED IRON DEFICIENCY ANEMIA TYPE: ICD-10-CM

## 2022-07-22 PROBLEM — R11.2 NAUSEA AND VOMITING: Status: RESOLVED | Noted: 2022-07-20 | Resolved: 2022-07-22

## 2022-07-22 LAB
ALBUMIN SERPL ELPH-MCNC: 2.1 G/DL (ref 2.9–4.4)
ALBUMIN SERPL-MCNC: 2.8 G/DL (ref 3.5–5.2)
ALBUMIN SERPL-MCNC: 2.9 G/DL (ref 3.5–5.2)
ALBUMIN/GLOB SERPL: 0.6 {RATIO} (ref 0.7–1.7)
ALBUMIN/GLOB SERPL: 1 G/DL
ALBUMIN/GLOB SERPL: 1.2 G/DL
ALP SERPL-CCNC: 48 U/L (ref 39–117)
ALP SERPL-CCNC: 53 U/L (ref 39–117)
ALPHA1 GLOB SERPL ELPH-MCNC: 0.4 G/DL (ref 0–0.4)
ALPHA2 GLOB SERPL ELPH-MCNC: 0.5 G/DL (ref 0.4–1)
ALT SERPL W P-5'-P-CCNC: 15 U/L (ref 1–33)
ALT SERPL W P-5'-P-CCNC: 16 U/L (ref 1–33)
ANA SER QL: POSITIVE
ANION GAP SERPL CALCULATED.3IONS-SCNC: 12 MMOL/L (ref 5–15)
ANION GAP SERPL CALCULATED.3IONS-SCNC: 13.9 MMOL/L (ref 5–15)
ANISOCYTOSIS BLD QL: ABNORMAL
AST SERPL-CCNC: 17 U/L (ref 1–32)
AST SERPL-CCNC: 17 U/L (ref 1–32)
B-GLOBULIN SERPL ELPH-MCNC: 0.7 G/DL (ref 0.7–1.3)
BACTERIA FLD CULT: ABNORMAL
BACTERIA UR QL AUTO: ABNORMAL /HPF
BH BB BLOOD EXPIRATION DATE: NORMAL
BH BB BLOOD TYPE BARCODE: 5100
BH BB DISPENSE STATUS: NORMAL
BH BB PRODUCT CODE: NORMAL
BH BB UNIT NUMBER: NORMAL
BILIRUB SERPL-MCNC: 0.4 MG/DL (ref 0–1.2)
BILIRUB SERPL-MCNC: 0.4 MG/DL (ref 0–1.2)
BILIRUB UR QL STRIP: NEGATIVE
BUN SERPL-MCNC: 49 MG/DL (ref 6–20)
BUN SERPL-MCNC: 52 MG/DL (ref 6–20)
BUN/CREAT SERPL: 7 (ref 7–25)
BUN/CREAT SERPL: 7.1 (ref 7–25)
CALCIUM SPEC-SCNC: 11.1 MG/DL (ref 8.6–10.5)
CALCIUM SPEC-SCNC: 9.4 MG/DL (ref 8.6–10.5)
CENTROMERE B AB SER-ACNC: <0.2 AI (ref 0–0.9)
CHLORIDE SERPL-SCNC: 102 MMOL/L (ref 98–107)
CHLORIDE SERPL-SCNC: 99 MMOL/L (ref 98–107)
CHROMATIN AB SERPL-ACNC: >8 AI (ref 0–0.9)
CLARITY UR: ABNORMAL
CO2 SERPL-SCNC: 23 MMOL/L (ref 22–29)
CO2 SERPL-SCNC: 25.1 MMOL/L (ref 22–29)
COLOR UR: YELLOW
CREAT SERPL-MCNC: 7.04 MG/DL (ref 0.57–1)
CREAT SERPL-MCNC: 7.33 MG/DL (ref 0.57–1)
CREAT UR-MCNC: 120.2 MG/DL
DEPRECATED RDW RBC AUTO: 60.6 FL (ref 37–54)
DSDNA AB SER-ACNC: >300 IU/ML (ref 0–9)
EGFRCR SERPLBLD CKD-EPI 2021: 7.1 ML/MIN/1.73
EGFRCR SERPLBLD CKD-EPI 2021: 7.5 ML/MIN/1.73
ELLIPTOCYTES BLD QL SMEAR: ABNORMAL
ENA JO1 AB SER-ACNC: <0.2 AI (ref 0–0.9)
ENA RNP AB SER-ACNC: >8 AI (ref 0–0.9)
ENA SCL70 AB SER-ACNC: 0.4 AI (ref 0–0.9)
ENA SM AB SER-ACNC: >8 AI (ref 0–0.9)
ENA SS-A AB SER-ACNC: >8 AI (ref 0–0.9)
ENA SS-B AB SER-ACNC: 0.3 AI (ref 0–0.9)
ERYTHROCYTE [DISTWIDTH] IN BLOOD BY AUTOMATED COUNT: 25.2 % (ref 12.3–15.4)
FIBRINOGEN PPP-MCNC: 246 MG/DL (ref 219–464)
GAMMA GLOB SERPL ELPH-MCNC: 2.1 G/DL (ref 0.4–1.8)
GLOBULIN SER-MCNC: 3.7 G/DL (ref 2.2–3.9)
GLOBULIN UR ELPH-MCNC: 2.5 GM/DL
GLOBULIN UR ELPH-MCNC: 2.7 GM/DL
GLUCOSE SERPL-MCNC: 137 MG/DL (ref 65–99)
GLUCOSE SERPL-MCNC: 155 MG/DL (ref 65–99)
GLUCOSE UR STRIP-MCNC: NEGATIVE MG/DL
GRAM STN SPEC: ABNORMAL
HAPTOGLOB SERPL-MCNC: 67 MG/DL (ref 30–200)
HCT VFR BLD AUTO: 30.5 % (ref 34–46.6)
HGB BLD-MCNC: 9.6 G/DL (ref 12–15.9)
HGB UR QL STRIP.AUTO: ABNORMAL
HYALINE CASTS UR QL AUTO: ABNORMAL /LPF
IGA SERPL-MCNC: 227 MG/DL (ref 87–352)
IGG SERPL-MCNC: 2375 MG/DL (ref 586–1602)
IGM SERPL-MCNC: 139 MG/DL (ref 26–217)
INTERPRETATION SERPL IEP-IMP: ABNORMAL
KAPPA LC FREE SER-MCNC: 629.7 MG/L (ref 3.3–19.4)
KAPPA LC FREE/LAMBDA FREE SER: 1.39 {RATIO} (ref 0.26–1.65)
KETONES UR QL STRIP: NEGATIVE
LABORATORY COMMENT REPORT: ABNORMAL
LAMBDA LC FREE SERPL-MCNC: 453.2 MG/L (ref 5.7–26.3)
LEUKOCYTE ESTERASE UR QL STRIP.AUTO: NEGATIVE
LYMPHOCYTES # BLD MANUAL: 0.36 10*3/MM3 (ref 0.7–3.1)
Lab: ABNORMAL
M PROTEIN SERPL ELPH-MCNC: ABNORMAL G/DL
MAGNESIUM SERPL-MCNC: 1.9 MG/DL (ref 1.6–2.6)
MCH RBC QN AUTO: 21.8 PG (ref 26.6–33)
MCHC RBC AUTO-ENTMCNC: 31.5 G/DL (ref 31.5–35.7)
MCV RBC AUTO: 69.2 FL (ref 79–97)
MICROCYTES BLD QL: ABNORMAL
NEUTROPHILS # BLD AUTO: 4.11 10*3/MM3 (ref 1.7–7)
NEUTROPHILS NFR BLD MANUAL: 92 % (ref 42.7–76)
NITRITE UR QL STRIP: NEGATIVE
OVALOCYTES BLD QL SMEAR: ABNORMAL
PH UR STRIP.AUTO: 8.5 [PH] (ref 5–8)
PHOSPHATE SERPL-MCNC: 9.8 MG/DL (ref 2.5–4.5)
PLAT MORPH BLD: NORMAL
PLATELET # BLD AUTO: 83 10*3/MM3 (ref 140–450)
POIKILOCYTOSIS BLD QL SMEAR: ABNORMAL
POTASSIUM SERPL-SCNC: 5.4 MMOL/L (ref 3.5–5.2)
POTASSIUM SERPL-SCNC: 5.6 MMOL/L (ref 3.5–5.2)
PROT ?TM UR-MCNC: 239.4 MG/DL
PROT SERPL-MCNC: 5.4 G/DL (ref 6–8.5)
PROT SERPL-MCNC: 5.5 G/DL (ref 6–8.5)
PROT SERPL-MCNC: 5.8 G/DL (ref 6–8.5)
PROT UR QL STRIP: ABNORMAL
RBC # BLD AUTO: 4.41 10*6/MM3 (ref 3.77–5.28)
RBC # UR STRIP: ABNORMAL /HPF
REF LAB TEST METHOD: ABNORMAL
SODIUM SERPL-SCNC: 137 MMOL/L (ref 136–145)
SODIUM SERPL-SCNC: 138 MMOL/L (ref 136–145)
SODIUM UR-SCNC: 54 MMOL/L
SP GR UR STRIP: 1.01 (ref 1–1.03)
SQUAMOUS #/AREA URNS HPF: ABNORMAL /HPF
TRI-PHOS CRY URNS QL MICRO: ABNORMAL /HPF
TROPONIN T SERPL-MCNC: 0.11 NG/ML (ref 0–0.03)
UNIT  ABO: NORMAL
UNIT  RH: NORMAL
UROBILINOGEN UR QL STRIP: ABNORMAL
VARIANT LYMPHS NFR BLD MANUAL: 8 % (ref 19.6–45.3)
VWF CP ACT/NOR PPP CHRO: 54 %
WBC # UR STRIP: ABNORMAL /HPF
WBC MORPH BLD: NORMAL
WBC NRBC COR # BLD: 4.47 10*3/MM3 (ref 3.4–10.8)

## 2022-07-22 PROCEDURE — 80053 COMPREHEN METABOLIC PANEL: CPT | Performed by: INTERNAL MEDICINE

## 2022-07-22 PROCEDURE — 83735 ASSAY OF MAGNESIUM: CPT | Performed by: INTERNAL MEDICINE

## 2022-07-22 PROCEDURE — P9059 PLASMA, FRZ BETWEEN 8-24HOUR: HCPCS

## 2022-07-22 PROCEDURE — 84165 PROTEIN E-PHORESIS SERUM: CPT | Performed by: INTERNAL MEDICINE

## 2022-07-22 PROCEDURE — 81001 URINALYSIS AUTO W/SCOPE: CPT | Performed by: EMERGENCY MEDICINE

## 2022-07-22 PROCEDURE — 25010000002 DIPHENHYDRAMINE PER 50 MG: Performed by: INTERNAL MEDICINE

## 2022-07-22 PROCEDURE — 36430 TRANSFUSION BLD/BLD COMPNT: CPT

## 2022-07-22 PROCEDURE — 84300 ASSAY OF URINE SODIUM: CPT | Performed by: INTERNAL MEDICINE

## 2022-07-22 PROCEDURE — 63710000001 PREDNISONE PER 5 MG: Performed by: INTERNAL MEDICINE

## 2022-07-22 PROCEDURE — 84100 ASSAY OF PHOSPHORUS: CPT | Performed by: INTERNAL MEDICINE

## 2022-07-22 PROCEDURE — P9017 PLASMA 1 DONOR FRZ W/IN 8 HR: HCPCS

## 2022-07-22 PROCEDURE — 99222 1ST HOSP IP/OBS MODERATE 55: CPT | Performed by: INTERNAL MEDICINE

## 2022-07-22 PROCEDURE — 83521 IG LIGHT CHAINS FREE EACH: CPT | Performed by: INTERNAL MEDICINE

## 2022-07-22 PROCEDURE — 82570 ASSAY OF URINE CREATININE: CPT | Performed by: INTERNAL MEDICINE

## 2022-07-22 PROCEDURE — 25010000002 CALCIUM GLUCONATE PER 10 ML: Performed by: INTERNAL MEDICINE

## 2022-07-22 PROCEDURE — 83010 ASSAY OF HAPTOGLOBIN QUANT: CPT | Performed by: INTERNAL MEDICINE

## 2022-07-22 PROCEDURE — 85007 BL SMEAR W/DIFF WBC COUNT: CPT | Performed by: INTERNAL MEDICINE

## 2022-07-22 PROCEDURE — 86334 IMMUNOFIX E-PHORESIS SERUM: CPT | Performed by: INTERNAL MEDICINE

## 2022-07-22 PROCEDURE — 84484 ASSAY OF TROPONIN QUANT: CPT | Performed by: INTERNAL MEDICINE

## 2022-07-22 PROCEDURE — 84156 ASSAY OF PROTEIN URINE: CPT | Performed by: INTERNAL MEDICINE

## 2022-07-22 PROCEDURE — 86927 PLASMA FRESH FROZEN: CPT

## 2022-07-22 PROCEDURE — 85384 FIBRINOGEN ACTIVITY: CPT | Performed by: INTERNAL MEDICINE

## 2022-07-22 PROCEDURE — 82784 ASSAY IGA/IGD/IGG/IGM EACH: CPT | Performed by: INTERNAL MEDICINE

## 2022-07-22 PROCEDURE — 85025 COMPLETE CBC W/AUTO DIFF WBC: CPT | Performed by: INTERNAL MEDICINE

## 2022-07-22 PROCEDURE — 74018 RADEX ABDOMEN 1 VIEW: CPT

## 2022-07-22 RX ORDER — CIPROFLOXACIN AND DEXAMETHASONE 3; 1 MG/ML; MG/ML
4 SUSPENSION/ DROPS AURICULAR (OTIC) 2 TIMES DAILY
Status: DISPENSED | OUTPATIENT
Start: 2022-07-22 | End: 2022-07-29

## 2022-07-22 RX ORDER — CLONIDINE HYDROCHLORIDE 0.1 MG/1
0.3 TABLET ORAL EVERY 8 HOURS SCHEDULED
Status: DISCONTINUED | OUTPATIENT
Start: 2022-07-22 | End: 2022-07-23

## 2022-07-22 RX ORDER — HYDROCODONE BITARTRATE AND ACETAMINOPHEN 5; 325 MG/1; MG/1
1 TABLET ORAL EVERY 6 HOURS PRN
Status: DISCONTINUED | OUTPATIENT
Start: 2022-07-22 | End: 2022-07-26

## 2022-07-22 RX ORDER — CIPROFLOXACIN AND FLUOCINOLONE ACETONIDE .75; .0625 MG/.25ML; MG/.25ML
0.25 SOLUTION AURICULAR (OTIC) 2 TIMES DAILY
Status: DISCONTINUED | OUTPATIENT
Start: 2022-07-22 | End: 2022-07-22

## 2022-07-22 RX ORDER — NIFEDIPINE 60 MG/1
120 TABLET, EXTENDED RELEASE ORAL
Status: DISCONTINUED | OUTPATIENT
Start: 2022-07-23 | End: 2022-07-23

## 2022-07-22 RX ADMIN — CLONIDINE HYDROCHLORIDE 0.2 MG: 0.1 TABLET ORAL at 09:02

## 2022-07-22 RX ADMIN — NIFEDIPINE 30 MG: 30 TABLET, FILM COATED, EXTENDED RELEASE ORAL at 09:02

## 2022-07-22 RX ADMIN — ACETAMINOPHEN 650 MG: 325 TABLET, FILM COATED ORAL at 13:05

## 2022-07-22 RX ADMIN — DIPHENHYDRAMINE HYDROCHLORIDE 25 MG: 50 INJECTION, SOLUTION INTRAMUSCULAR; INTRAVENOUS at 13:05

## 2022-07-22 RX ADMIN — SODIUM CHLORIDE 12.5 MG/HR: 9 INJECTION, SOLUTION INTRAVENOUS at 23:09

## 2022-07-22 RX ADMIN — DOCUSATE SODIUM 50MG AND SENNOSIDES 8.6MG 2 TABLET: 8.6; 5 TABLET, FILM COATED ORAL at 21:28

## 2022-07-22 RX ADMIN — CALCIUM GLUCONATE 5 G: 98 INJECTION, SOLUTION INTRAVENOUS at 13:45

## 2022-07-22 RX ADMIN — CLONIDINE HYDROCHLORIDE 0.3 MG: 0.1 TABLET ORAL at 21:28

## 2022-07-22 RX ADMIN — ANTICOAGULANT CITRATE DEXTROSE SOLUTION FORMULA A 1000 ML: 12.25; 11; 3.65 SOLUTION INTRAVENOUS at 13:45

## 2022-07-22 RX ADMIN — PREDNISONE 60 MG: 50 TABLET ORAL at 09:02

## 2022-07-22 RX ADMIN — SODIUM CHLORIDE 5 MG/HR: 9 INJECTION, SOLUTION INTRAVENOUS at 01:08

## 2022-07-23 ENCOUNTER — APPOINTMENT (OUTPATIENT)
Dept: CT IMAGING | Facility: HOSPITAL | Age: 32
End: 2022-07-23

## 2022-07-23 LAB
ALBUMIN SERPL-MCNC: 2.9 G/DL (ref 3.5–5.2)
ALBUMIN/GLOB SERPL: 1.3 G/DL
ALP SERPL-CCNC: 44 U/L (ref 39–117)
ALT SERPL W P-5'-P-CCNC: 13 U/L (ref 1–33)
ANION GAP SERPL CALCULATED.3IONS-SCNC: 14.8 MMOL/L (ref 5–15)
ANISOCYTOSIS BLD QL: ABNORMAL
ARTERIAL PATENCY WRIST A: POSITIVE
AST SERPL-CCNC: 14 U/L (ref 1–32)
ATMOSPHERIC PRESS: 750.6 MMHG
BASE EXCESS BLDA CALC-SCNC: 0.3 MMOL/L (ref 0–2)
BDY SITE: ABNORMAL
BH BB BLOOD EXPIRATION DATE: NORMAL
BH BB BLOOD TYPE BARCODE: 5100
BH BB BLOOD TYPE BARCODE: 9500
BH BB DISPENSE STATUS: NORMAL
BH BB PRODUCT CODE: NORMAL
BH BB UNIT NUMBER: NORMAL
BILIRUB SERPL-MCNC: 0.2 MG/DL (ref 0–1.2)
BUN SERPL-MCNC: 59 MG/DL (ref 6–20)
BUN/CREAT SERPL: 7.4 (ref 7–25)
CALCIUM SPEC-SCNC: 10.3 MG/DL (ref 8.6–10.5)
CHLORIDE SERPL-SCNC: 97 MMOL/L (ref 98–107)
CO2 SERPL-SCNC: 26.2 MMOL/L (ref 22–29)
CREAT SERPL-MCNC: 7.93 MG/DL (ref 0.57–1)
DEPRECATED RDW RBC AUTO: 62.2 FL (ref 37–54)
EGFRCR SERPLBLD CKD-EPI 2021: 6.5 ML/MIN/1.73
ERYTHROCYTE [DISTWIDTH] IN BLOOD BY AUTOMATED COUNT: 25.7 % (ref 12.3–15.4)
FIBRINOGEN PPP-MCNC: 191 MG/DL (ref 219–464)
GAS FLOW AIRWAY: 4 LPM
GLOBULIN UR ELPH-MCNC: 2.3 GM/DL
GLUCOSE SERPL-MCNC: 122 MG/DL (ref 65–99)
HAPTOGLOB SERPL-MCNC: 94 MG/DL (ref 30–200)
HBV SURFACE AB SER RIA-ACNC: REACTIVE
HBV SURFACE AG SERPL QL IA: NORMAL
HCO3 BLDA-SCNC: 23.5 MMOL/L (ref 22–28)
HCT VFR BLD AUTO: 26.4 % (ref 34–46.6)
HGB BLD-MCNC: 8.4 G/DL (ref 12–15.9)
HYPOCHROMIA BLD QL: ABNORMAL
LDH SERPL-CCNC: 209 U/L (ref 135–214)
LYMPHOCYTES # BLD MANUAL: 1.28 10*3/MM3 (ref 0.7–3.1)
LYMPHOCYTES NFR BLD MANUAL: 7.1 % (ref 5–12)
MCH RBC QN AUTO: 22 PG (ref 26.6–33)
MCHC RBC AUTO-ENTMCNC: 31.8 G/DL (ref 31.5–35.7)
MCV RBC AUTO: 69.1 FL (ref 79–97)
MICROCYTES BLD QL: ABNORMAL
MODALITY: ABNORMAL
MONOCYTES # BLD: 0.69 10*3/MM3 (ref 0.1–0.9)
NEUTROPHILS # BLD AUTO: 7.8 10*3/MM3 (ref 1.7–7)
NEUTROPHILS NFR BLD MANUAL: 79.8 % (ref 42.7–76)
OVALOCYTES BLD QL SMEAR: ABNORMAL
PCO2 BLDA: 31.8 MM HG (ref 35–45)
PH BLDA: 7.48 PH UNITS (ref 7.35–7.45)
PLAT MORPH BLD: NORMAL
PLATELET # BLD AUTO: 91 10*3/MM3 (ref 140–450)
PO2 BLDA: 84.3 MM HG (ref 80–100)
POIKILOCYTOSIS BLD QL SMEAR: ABNORMAL
POTASSIUM SERPL-SCNC: 5.9 MMOL/L (ref 3.5–5.2)
POTASSIUM SERPL-SCNC: 6.1 MMOL/L (ref 3.5–5.2)
POTASSIUM SERPL-SCNC: 6.2 MMOL/L (ref 3.5–5.2)
PROT SERPL-MCNC: 5.2 G/DL (ref 6–8.5)
QT INTERVAL: 368 MS
RBC # BLD AUTO: 3.82 10*6/MM3 (ref 3.77–5.28)
RETICS # AUTO: 0.07 10*6/MM3 (ref 0.02–0.13)
RETICS/RBC NFR AUTO: 1.66 % (ref 0.7–1.9)
SAO2 % BLDCOA: 97.2 % (ref 92–99)
SMUDGE CELLS BLD QL SMEAR: ABNORMAL
SODIUM SERPL-SCNC: 138 MMOL/L (ref 136–145)
TOTAL RATE: 20 BREATHS/MINUTE
UNIT  ABO: NORMAL
UNIT  RH: NORMAL
VARIANT LYMPHS NFR BLD MANUAL: 13.1 % (ref 19.6–45.3)
WBC NRBC COR # BLD: 9.78 10*3/MM3 (ref 3.4–10.8)

## 2022-07-23 PROCEDURE — 85384 FIBRINOGEN ACTIVITY: CPT | Performed by: INTERNAL MEDICINE

## 2022-07-23 PROCEDURE — 85045 AUTOMATED RETICULOCYTE COUNT: CPT | Performed by: INTERNAL MEDICINE

## 2022-07-23 PROCEDURE — 84132 ASSAY OF SERUM POTASSIUM: CPT | Performed by: HOSPITALIST

## 2022-07-23 PROCEDURE — 74176 CT ABD & PELVIS W/O CONTRAST: CPT

## 2022-07-23 PROCEDURE — 85007 BL SMEAR W/DIFF WBC COUNT: CPT | Performed by: INTERNAL MEDICINE

## 2022-07-23 PROCEDURE — 93005 ELECTROCARDIOGRAM TRACING: CPT | Performed by: HOSPITALIST

## 2022-07-23 PROCEDURE — 63710000001 INSULIN REGULAR HUMAN PER 5 UNITS: Performed by: INTERNAL MEDICINE

## 2022-07-23 PROCEDURE — 63710000001 PREDNISONE PER 5 MG: Performed by: INTERNAL MEDICINE

## 2022-07-23 PROCEDURE — 85025 COMPLETE CBC W/AUTO DIFF WBC: CPT | Performed by: INTERNAL MEDICINE

## 2022-07-23 PROCEDURE — 87340 HEPATITIS B SURFACE AG IA: CPT | Performed by: INTERNAL MEDICINE

## 2022-07-23 PROCEDURE — 83010 ASSAY OF HAPTOGLOBIN QUANT: CPT | Performed by: INTERNAL MEDICINE

## 2022-07-23 PROCEDURE — 84132 ASSAY OF SERUM POTASSIUM: CPT | Performed by: INTERNAL MEDICINE

## 2022-07-23 PROCEDURE — 99232 SBSQ HOSP IP/OBS MODERATE 35: CPT | Performed by: INTERNAL MEDICINE

## 2022-07-23 PROCEDURE — 86706 HEP B SURFACE ANTIBODY: CPT | Performed by: INTERNAL MEDICINE

## 2022-07-23 PROCEDURE — 93010 ELECTROCARDIOGRAM REPORT: CPT | Performed by: INTERNAL MEDICINE

## 2022-07-23 PROCEDURE — 36600 WITHDRAWAL OF ARTERIAL BLOOD: CPT

## 2022-07-23 PROCEDURE — 25010000002 CALCIUM GLUCONATE-NACL 1-0.675 GM/50ML-% SOLUTION: Performed by: INTERNAL MEDICINE

## 2022-07-23 PROCEDURE — 83615 LACTATE (LD) (LDH) ENZYME: CPT | Performed by: INTERNAL MEDICINE

## 2022-07-23 PROCEDURE — 25010000002 METHYLPREDNISOLONE PER 125 MG: Performed by: INTERNAL MEDICINE

## 2022-07-23 PROCEDURE — 82803 BLOOD GASES ANY COMBINATION: CPT

## 2022-07-23 PROCEDURE — 80053 COMPREHEN METABOLIC PANEL: CPT | Performed by: INTERNAL MEDICINE

## 2022-07-23 PROCEDURE — 25010000002 FUROSEMIDE PER 20 MG: Performed by: INTERNAL MEDICINE

## 2022-07-23 RX ORDER — CLONIDINE 0.3 MG/24H
1 PATCH, EXTENDED RELEASE TRANSDERMAL WEEKLY
Status: DISCONTINUED | OUTPATIENT
Start: 2022-07-23 | End: 2022-07-30 | Stop reason: HOSPADM

## 2022-07-23 RX ORDER — MINOXIDIL 2.5 MG/1
5 TABLET ORAL EVERY 12 HOURS SCHEDULED
Status: DISCONTINUED | OUTPATIENT
Start: 2022-07-23 | End: 2022-07-25

## 2022-07-23 RX ORDER — FUROSEMIDE 10 MG/ML
80 INJECTION INTRAMUSCULAR; INTRAVENOUS ONCE
Status: COMPLETED | OUTPATIENT
Start: 2022-07-23 | End: 2022-07-23

## 2022-07-23 RX ORDER — CALCIUM GLUCONATE 20 MG/ML
1 INJECTION, SOLUTION INTRAVENOUS ONCE
Status: COMPLETED | OUTPATIENT
Start: 2022-07-23 | End: 2022-07-23

## 2022-07-23 RX ORDER — HYDRALAZINE HYDROCHLORIDE 25 MG/1
25 TABLET, FILM COATED ORAL EVERY 8 HOURS SCHEDULED
Status: DISCONTINUED | OUTPATIENT
Start: 2022-07-23 | End: 2022-07-23

## 2022-07-23 RX ORDER — DEXTROSE MONOHYDRATE 25 G/50ML
50 INJECTION, SOLUTION INTRAVENOUS ONCE
Status: COMPLETED | OUTPATIENT
Start: 2022-07-23 | End: 2022-07-23

## 2022-07-23 RX ADMIN — INSULIN HUMAN 10 UNITS: 100 INJECTION, SOLUTION PARENTERAL at 08:34

## 2022-07-23 RX ADMIN — CIPROFLOXACIN AND DEXAMETHASONE 4 DROP: 3; 1 SUSPENSION/ DROPS AURICULAR (OTIC) at 21:24

## 2022-07-23 RX ADMIN — DOCUSATE SODIUM 50MG AND SENNOSIDES 8.6MG 2 TABLET: 8.6; 5 TABLET, FILM COATED ORAL at 08:33

## 2022-07-23 RX ADMIN — NIFEDIPINE 120 MG: 60 TABLET, FILM COATED, EXTENDED RELEASE ORAL at 08:33

## 2022-07-23 RX ADMIN — CALCIUM GLUCONATE 1 G: 20 INJECTION, SOLUTION INTRAVENOUS at 08:33

## 2022-07-23 RX ADMIN — CLONIDINE 1 PATCH: 0.3 PATCH TRANSDERMAL at 11:50

## 2022-07-23 RX ADMIN — CLONIDINE HYDROCHLORIDE 0.3 MG: 0.1 TABLET ORAL at 06:38

## 2022-07-23 RX ADMIN — SODIUM ZIRCONIUM CYCLOSILICATE 10 G: 10 POWDER, FOR SUSPENSION ORAL at 18:24

## 2022-07-23 RX ADMIN — CIPROFLOXACIN AND DEXAMETHASONE 4 DROP: 3; 1 SUSPENSION/ DROPS AURICULAR (OTIC) at 08:33

## 2022-07-23 RX ADMIN — FUROSEMIDE 80 MG: 10 INJECTION, SOLUTION INTRAMUSCULAR; INTRAVENOUS at 17:50

## 2022-07-23 RX ADMIN — PREDNISONE 60 MG: 50 TABLET ORAL at 08:33

## 2022-07-23 RX ADMIN — DEXTROSE MONOHYDRATE 50 ML: 25 INJECTION, SOLUTION INTRAVENOUS at 08:34

## 2022-07-23 RX ADMIN — DOCUSATE SODIUM 50MG AND SENNOSIDES 8.6MG 2 TABLET: 8.6; 5 TABLET, FILM COATED ORAL at 21:23

## 2022-07-23 RX ADMIN — SODIUM ZIRCONIUM CYCLOSILICATE 10 G: 10 POWDER, FOR SUSPENSION ORAL at 08:33

## 2022-07-23 RX ADMIN — FAMOTIDINE 20 MG: 10 INJECTION INTRAVENOUS at 08:34

## 2022-07-23 RX ADMIN — MINOXIDIL 5 MG: 2.5 TABLET ORAL at 21:23

## 2022-07-23 RX ADMIN — MINOXIDIL 5 MG: 2.5 TABLET ORAL at 11:50

## 2022-07-24 LAB
ALBUMIN SERPL-MCNC: 2.8 G/DL (ref 3.5–5.2)
ALBUMIN/GLOB SERPL: 1.2 G/DL
ALP SERPL-CCNC: 47 U/L (ref 39–117)
ALT SERPL W P-5'-P-CCNC: 15 U/L (ref 1–33)
ANION GAP SERPL CALCULATED.3IONS-SCNC: 13 MMOL/L (ref 5–15)
AST SERPL-CCNC: 16 U/L (ref 1–32)
BILIRUB SERPL-MCNC: 0.2 MG/DL (ref 0–1.2)
BUN SERPL-MCNC: 62 MG/DL (ref 6–20)
BUN/CREAT SERPL: 7.7 (ref 7–25)
CALCIUM SPEC-SCNC: 9 MG/DL (ref 8.6–10.5)
CHLORIDE SERPL-SCNC: 99 MMOL/L (ref 98–107)
CO2 SERPL-SCNC: 23 MMOL/L (ref 22–29)
CREAT SERPL-MCNC: 8.1 MG/DL (ref 0.57–1)
DEPRECATED RDW RBC AUTO: 63.6 FL (ref 37–54)
EGFRCR SERPLBLD CKD-EPI 2021: 6.3 ML/MIN/1.73
ERYTHROCYTE [DISTWIDTH] IN BLOOD BY AUTOMATED COUNT: 25.2 % (ref 12.3–15.4)
FIBRINOGEN PPP-MCNC: 157 MG/DL (ref 219–464)
GLOBULIN UR ELPH-MCNC: 2.4 GM/DL
GLUCOSE SERPL-MCNC: 164 MG/DL (ref 65–99)
HAPTOGLOB SERPL-MCNC: 90 MG/DL (ref 30–200)
HCT VFR BLD AUTO: 24.7 % (ref 34–46.6)
HGB BLD-MCNC: 7.6 G/DL (ref 12–15.9)
LDH SERPL-CCNC: 224 U/L (ref 135–214)
LYMPHOCYTES # BLD MANUAL: 0.09 10*3/MM3 (ref 0.7–3.1)
MCH RBC QN AUTO: 22.1 PG (ref 26.6–33)
MCHC RBC AUTO-ENTMCNC: 30.8 G/DL (ref 31.5–35.7)
MCV RBC AUTO: 71.8 FL (ref 79–97)
NEUTROPHILS # BLD AUTO: 9.2 10*3/MM3 (ref 1.7–7)
NEUTROPHILS NFR BLD MANUAL: 99 % (ref 42.7–76)
PHOSPHATE SERPL-MCNC: 9.5 MG/DL (ref 2.5–4.5)
PLAT MORPH BLD: NORMAL
PLATELET # BLD AUTO: 82 10*3/MM3 (ref 140–450)
POIKILOCYTOSIS BLD QL SMEAR: ABNORMAL
POTASSIUM SERPL-SCNC: 6 MMOL/L (ref 3.5–5.2)
PROT SERPL-MCNC: 5.2 G/DL (ref 6–8.5)
RBC # BLD AUTO: 3.44 10*6/MM3 (ref 3.77–5.28)
RETICS # AUTO: 0.06 10*6/MM3 (ref 0.02–0.13)
RETICS/RBC NFR AUTO: 1.97 % (ref 0.7–1.9)
SCHISTOCYTES BLD QL SMEAR: ABNORMAL
SODIUM SERPL-SCNC: 135 MMOL/L (ref 136–145)
VARIANT LYMPHS NFR BLD MANUAL: 1 % (ref 19.6–45.3)
WBC MORPH BLD: NORMAL
WBC NRBC COR # BLD: 9.29 10*3/MM3 (ref 3.4–10.8)

## 2022-07-24 PROCEDURE — 83010 ASSAY OF HAPTOGLOBIN QUANT: CPT | Performed by: INTERNAL MEDICINE

## 2022-07-24 PROCEDURE — 84100 ASSAY OF PHOSPHORUS: CPT | Performed by: INTERNAL MEDICINE

## 2022-07-24 PROCEDURE — 85007 BL SMEAR W/DIFF WBC COUNT: CPT | Performed by: INTERNAL MEDICINE

## 2022-07-24 PROCEDURE — 25010000002 METHYLPREDNISOLONE PER 125 MG: Performed by: INTERNAL MEDICINE

## 2022-07-24 PROCEDURE — 83615 LACTATE (LD) (LDH) ENZYME: CPT | Performed by: INTERNAL MEDICINE

## 2022-07-24 PROCEDURE — 99233 SBSQ HOSP IP/OBS HIGH 50: CPT | Performed by: INTERNAL MEDICINE

## 2022-07-24 PROCEDURE — 80053 COMPREHEN METABOLIC PANEL: CPT | Performed by: INTERNAL MEDICINE

## 2022-07-24 PROCEDURE — 85045 AUTOMATED RETICULOCYTE COUNT: CPT | Performed by: INTERNAL MEDICINE

## 2022-07-24 PROCEDURE — 85025 COMPLETE CBC W/AUTO DIFF WBC: CPT | Performed by: INTERNAL MEDICINE

## 2022-07-24 PROCEDURE — 85384 FIBRINOGEN ACTIVITY: CPT | Performed by: INTERNAL MEDICINE

## 2022-07-24 RX ORDER — AMLODIPINE BESYLATE 5 MG/1
5 TABLET ORAL
Status: DISCONTINUED | OUTPATIENT
Start: 2022-07-24 | End: 2022-07-25

## 2022-07-24 RX ORDER — PANTOPRAZOLE SODIUM 40 MG/1
40 TABLET, DELAYED RELEASE ORAL
Status: DISCONTINUED | OUTPATIENT
Start: 2022-07-24 | End: 2022-07-30 | Stop reason: HOSPADM

## 2022-07-24 RX ORDER — SEVELAMER CARBONATE 800 MG/1
800 TABLET, FILM COATED ORAL
Status: DISCONTINUED | OUTPATIENT
Start: 2022-07-24 | End: 2022-07-30 | Stop reason: HOSPADM

## 2022-07-24 RX ORDER — GUAIFENESIN 600 MG/1
600 TABLET, EXTENDED RELEASE ORAL EVERY 12 HOURS SCHEDULED
Status: DISCONTINUED | OUTPATIENT
Start: 2022-07-24 | End: 2022-07-30 | Stop reason: HOSPADM

## 2022-07-24 RX ADMIN — SEVELAMER CARBONATE 800 MG: 800 TABLET, FILM COATED ORAL at 12:13

## 2022-07-24 RX ADMIN — SODIUM CHLORIDE 500 MG: 900 INJECTION INTRAVENOUS at 09:04

## 2022-07-24 RX ADMIN — SODIUM ZIRCONIUM CYCLOSILICATE 10 G: 10 POWDER, FOR SUSPENSION ORAL at 12:13

## 2022-07-24 RX ADMIN — FAMOTIDINE 20 MG: 10 INJECTION INTRAVENOUS at 09:04

## 2022-07-24 RX ADMIN — AMLODIPINE BESYLATE 5 MG: 5 TABLET ORAL at 12:13

## 2022-07-24 RX ADMIN — GUAIFENESIN 600 MG: 600 TABLET, EXTENDED RELEASE ORAL at 20:38

## 2022-07-24 RX ADMIN — DOCUSATE SODIUM 50MG AND SENNOSIDES 8.6MG 2 TABLET: 8.6; 5 TABLET, FILM COATED ORAL at 09:10

## 2022-07-24 RX ADMIN — MINOXIDIL 5 MG: 2.5 TABLET ORAL at 20:38

## 2022-07-24 RX ADMIN — CIPROFLOXACIN AND DEXAMETHASONE 4 DROP: 3; 1 SUSPENSION/ DROPS AURICULAR (OTIC) at 09:04

## 2022-07-24 RX ADMIN — GUAIFENESIN 600 MG: 600 TABLET, EXTENDED RELEASE ORAL at 09:04

## 2022-07-24 RX ADMIN — DOCUSATE SODIUM 50MG AND SENNOSIDES 8.6MG 2 TABLET: 8.6; 5 TABLET, FILM COATED ORAL at 20:38

## 2022-07-24 RX ADMIN — PANTOPRAZOLE SODIUM 40 MG: 40 TABLET, DELAYED RELEASE ORAL at 12:13

## 2022-07-24 RX ADMIN — SEVELAMER CARBONATE 800 MG: 800 TABLET, FILM COATED ORAL at 18:22

## 2022-07-24 RX ADMIN — MINOXIDIL 5 MG: 2.5 TABLET ORAL at 09:04

## 2022-07-24 RX ADMIN — SODIUM CHLORIDE 5 MG/HR: 9 INJECTION, SOLUTION INTRAVENOUS at 18:25

## 2022-07-24 RX ADMIN — CIPROFLOXACIN AND DEXAMETHASONE 4 DROP: 3; 1 SUSPENSION/ DROPS AURICULAR (OTIC) at 20:39

## 2022-07-25 ENCOUNTER — APPOINTMENT (OUTPATIENT)
Dept: CT IMAGING | Facility: HOSPITAL | Age: 32
End: 2022-07-25

## 2022-07-25 LAB
ALBUMIN 24H MFR UR ELPH: 43.8 %
ALBUMIN SERPL ELPH-MCNC: 2.9 G/DL (ref 2.9–4.4)
ALBUMIN SERPL-MCNC: 2.7 G/DL (ref 3.5–5.2)
ALBUMIN/GLOB SERPL: 1 G/DL
ALBUMIN/GLOB SERPL: 1.4 {RATIO} (ref 0.7–1.7)
ALP SERPL-CCNC: 46 U/L (ref 39–117)
ALPHA1 GLOB 24H MFR UR ELPH: 7.5 %
ALPHA1 GLOB SERPL ELPH-MCNC: 0.2 G/DL (ref 0–0.4)
ALPHA2 GLOB 24H MFR UR ELPH: 6.7 %
ALPHA2 GLOB SERPL ELPH-MCNC: 0.5 G/DL (ref 0.4–1)
ALT SERPL W P-5'-P-CCNC: 13 U/L (ref 1–33)
ANION GAP SERPL CALCULATED.3IONS-SCNC: 13.8 MMOL/L (ref 5–15)
ANISOCYTOSIS BLD QL: ABNORMAL
AST SERPL-CCNC: 21 U/L (ref 1–32)
B-GLOBULIN MFR UR ELPH: 17.4 %
B-GLOBULIN SERPL ELPH-MCNC: 0.7 G/DL (ref 0.7–1.3)
BILIRUB SERPL-MCNC: 0.3 MG/DL (ref 0–1.2)
BUN SERPL-MCNC: 51 MG/DL (ref 6–20)
BUN/CREAT SERPL: 7.3 (ref 7–25)
CALCIUM SPEC-SCNC: 8.1 MG/DL (ref 8.6–10.5)
CHLORIDE SERPL-SCNC: 103 MMOL/L (ref 98–107)
CO2 SERPL-SCNC: 22.2 MMOL/L (ref 22–29)
CREAT SERPL-MCNC: 6.94 MG/DL (ref 0.57–1)
DEPRECATED RDW RBC AUTO: 61 FL (ref 37–54)
EGFRCR SERPLBLD CKD-EPI 2021: 7.6 ML/MIN/1.73
ERYTHROCYTE [DISTWIDTH] IN BLOOD BY AUTOMATED COUNT: 24.9 % (ref 12.3–15.4)
FIBRINOGEN PPP-MCNC: 120 MG/DL (ref 219–464)
GAMMA GLOB 24H MFR UR ELPH: 24.6 %
GAMMA GLOB SERPL ELPH-MCNC: 0.8 G/DL (ref 0.4–1.8)
GLOBULIN SER-MCNC: 2.2 G/DL (ref 2.2–3.9)
GLOBULIN UR ELPH-MCNC: 2.6 GM/DL
GLUCOSE SERPL-MCNC: 119 MG/DL (ref 65–99)
HAPTOGLOB SERPL-MCNC: 68 MG/DL (ref 30–200)
HCT VFR BLD AUTO: 21.9 % (ref 34–46.6)
HGB BLD-MCNC: 7 G/DL (ref 12–15.9)
HIV 1 & 2 AB SER-IMP: ABNORMAL
HYPOCHROMIA BLD QL: ABNORMAL
IGA SERPL-MCNC: 170 MG/DL (ref 87–352)
IGG SERPL-MCNC: 942 MG/DL (ref 586–1602)
IGM SERPL-MCNC: 69 MG/DL (ref 26–217)
INTERPRETATION SERPL IEP-IMP: ABNORMAL
INTERPRETATION UR IFE-IMP: ABNORMAL
KAPPA LC FREE SER-MCNC: 255.9 MG/L (ref 3.3–19.4)
KAPPA LC FREE/LAMBDA FREE SER: 1.6 {RATIO} (ref 0.26–1.65)
LABORATORY COMMENT REPORT: ABNORMAL
LAMBDA LC FREE SERPL-MCNC: 160 MG/L (ref 5.7–26.3)
LYMPHOCYTES # BLD MANUAL: 0.87 10*3/MM3 (ref 0.7–3.1)
LYMPHOCYTES NFR BLD MANUAL: 2 % (ref 5–12)
M PROTEIN 24H MFR UR ELPH: ABNORMAL %
M PROTEIN SERPL ELPH-MCNC: ABNORMAL G/DL
MCH RBC QN AUTO: 22.7 PG (ref 26.6–33)
MCHC RBC AUTO-ENTMCNC: 32 G/DL (ref 31.5–35.7)
MCV RBC AUTO: 71.1 FL (ref 79–97)
MICROCYTES BLD QL: ABNORMAL
MONOCYTES # BLD: 0.25 10*3/MM3 (ref 0.1–0.9)
NEUTROPHILS # BLD AUTO: 11.35 10*3/MM3 (ref 1.7–7)
NEUTROPHILS NFR BLD MANUAL: 91 % (ref 42.7–76)
PHOSPHATE SERPL-MCNC: 6.6 MG/DL (ref 2.5–4.5)
PLAT MORPH BLD: NORMAL
PLATELET # BLD AUTO: 57 10*3/MM3 (ref 140–450)
POIKILOCYTOSIS BLD QL SMEAR: ABNORMAL
POTASSIUM SERPL-SCNC: 4.6 MMOL/L (ref 3.5–5.2)
PROT 24H UR-MRATE: 2129 MG/24 HR (ref 30–150)
PROT SERPL-MCNC: 5.1 G/DL (ref 6–8.5)
PROT SERPL-MCNC: 5.3 G/DL (ref 6–8.5)
PROT UR-MCNC: 152.1 MG/DL
RBC # BLD AUTO: 3.08 10*6/MM3 (ref 3.77–5.28)
SCHISTOCYTES BLD QL SMEAR: ABNORMAL
SODIUM SERPL-SCNC: 139 MMOL/L (ref 136–145)
VARIANT LYMPHS NFR BLD MANUAL: 7 % (ref 19.6–45.3)
WBC MORPH BLD: NORMAL
WBC NRBC COR # BLD: 12.47 10*3/MM3 (ref 3.4–10.8)

## 2022-07-25 PROCEDURE — 85384 FIBRINOGEN ACTIVITY: CPT | Performed by: STUDENT IN AN ORGANIZED HEALTH CARE EDUCATION/TRAINING PROGRAM

## 2022-07-25 PROCEDURE — 25010000002 METHYLPREDNISOLONE PER 125 MG: Performed by: INTERNAL MEDICINE

## 2022-07-25 PROCEDURE — 84100 ASSAY OF PHOSPHORUS: CPT | Performed by: INTERNAL MEDICINE

## 2022-07-25 PROCEDURE — 80053 COMPREHEN METABOLIC PANEL: CPT | Performed by: INTERNAL MEDICINE

## 2022-07-25 PROCEDURE — 85007 BL SMEAR W/DIFF WBC COUNT: CPT | Performed by: INTERNAL MEDICINE

## 2022-07-25 PROCEDURE — 99231 SBSQ HOSP IP/OBS SF/LOW 25: CPT | Performed by: INTERNAL MEDICINE

## 2022-07-25 PROCEDURE — 83010 ASSAY OF HAPTOGLOBIN QUANT: CPT | Performed by: STUDENT IN AN ORGANIZED HEALTH CARE EDUCATION/TRAINING PROGRAM

## 2022-07-25 PROCEDURE — 25010000002 ONDANSETRON PER 1 MG: Performed by: INTERNAL MEDICINE

## 2022-07-25 PROCEDURE — 99232 SBSQ HOSP IP/OBS MODERATE 35: CPT | Performed by: INTERNAL MEDICINE

## 2022-07-25 PROCEDURE — 85025 COMPLETE CBC W/AUTO DIFF WBC: CPT | Performed by: INTERNAL MEDICINE

## 2022-07-25 RX ORDER — CARVEDILOL 3.12 MG/1
3.12 TABLET ORAL 2 TIMES DAILY WITH MEALS
Status: DISCONTINUED | OUTPATIENT
Start: 2022-07-25 | End: 2022-07-30 | Stop reason: HOSPADM

## 2022-07-25 RX ORDER — AMLODIPINE BESYLATE 10 MG/1
10 TABLET ORAL
Status: DISCONTINUED | OUTPATIENT
Start: 2022-07-25 | End: 2022-07-30

## 2022-07-25 RX ORDER — MINOXIDIL 10 MG/1
10 TABLET ORAL EVERY 12 HOURS SCHEDULED
Status: DISCONTINUED | OUTPATIENT
Start: 2022-07-25 | End: 2022-07-30 | Stop reason: HOSPADM

## 2022-07-25 RX ADMIN — AMLODIPINE BESYLATE 10 MG: 10 TABLET ORAL at 11:19

## 2022-07-25 RX ADMIN — GUAIFENESIN 600 MG: 600 TABLET, EXTENDED RELEASE ORAL at 11:19

## 2022-07-25 RX ADMIN — MINOXIDIL 10 MG: 10 TABLET ORAL at 08:46

## 2022-07-25 RX ADMIN — CIPROFLOXACIN AND DEXAMETHASONE 4 DROP: 3; 1 SUSPENSION/ DROPS AURICULAR (OTIC) at 21:48

## 2022-07-25 RX ADMIN — SEVELAMER CARBONATE 800 MG: 800 TABLET, FILM COATED ORAL at 19:06

## 2022-07-25 RX ADMIN — CARVEDILOL 3.12 MG: 3.12 TABLET, FILM COATED ORAL at 16:42

## 2022-07-25 RX ADMIN — MINOXIDIL 10 MG: 10 TABLET ORAL at 21:41

## 2022-07-25 RX ADMIN — CIPROFLOXACIN AND DEXAMETHASONE 4 DROP: 3; 1 SUSPENSION/ DROPS AURICULAR (OTIC) at 08:57

## 2022-07-25 RX ADMIN — SODIUM CHLORIDE 500 MG: 900 INJECTION INTRAVENOUS at 08:47

## 2022-07-25 RX ADMIN — ONDANSETRON 4 MG: 2 INJECTION INTRAMUSCULAR; INTRAVENOUS at 02:20

## 2022-07-26 ENCOUNTER — ANESTHESIA EVENT (OUTPATIENT)
Dept: PERIOP | Facility: HOSPITAL | Age: 32
End: 2022-07-26

## 2022-07-26 ENCOUNTER — APPOINTMENT (OUTPATIENT)
Dept: CT IMAGING | Facility: HOSPITAL | Age: 32
End: 2022-07-26

## 2022-07-26 ENCOUNTER — APPOINTMENT (OUTPATIENT)
Dept: GENERAL RADIOLOGY | Facility: HOSPITAL | Age: 32
End: 2022-07-26

## 2022-07-26 ENCOUNTER — ANESTHESIA (OUTPATIENT)
Dept: PERIOP | Facility: HOSPITAL | Age: 32
End: 2022-07-26

## 2022-07-26 LAB
ABO GROUP BLD: NORMAL
BASOPHILS # BLD AUTO: 0 10*3/MM3 (ref 0–0.2)
BASOPHILS NFR BLD AUTO: 0 % (ref 0–1.5)
BLD GP AB SCN SERPL QL: NEGATIVE
DEPRECATED RDW RBC AUTO: 64.9 FL (ref 37–54)
DEPRECATED RDW RBC AUTO: 66.1 FL (ref 37–54)
EOSINOPHIL # BLD AUTO: 0 10*3/MM3 (ref 0–0.4)
EOSINOPHIL NFR BLD AUTO: 0 % (ref 0.3–6.2)
ERYTHROCYTE [DISTWIDTH] IN BLOOD BY AUTOMATED COUNT: 25.5 % (ref 12.3–15.4)
ERYTHROCYTE [DISTWIDTH] IN BLOOD BY AUTOMATED COUNT: 25.9 % (ref 12.3–15.4)
FIBRINOGEN PPP-MCNC: 132 MG/DL (ref 219–464)
HAPTOGLOB SERPL-MCNC: 69 MG/DL (ref 30–200)
HCT VFR BLD AUTO: 23.1 % (ref 34–46.6)
HCT VFR BLD AUTO: 25.6 % (ref 34–46.6)
HGB BLD-MCNC: 6.9 G/DL (ref 12–15.9)
HGB BLD-MCNC: 7.7 G/DL (ref 12–15.9)
INR PPP: 1.24 (ref 0.9–1.1)
LYMPHOCYTES # BLD AUTO: 0.69 10*3/MM3 (ref 0.7–3.1)
LYMPHOCYTES NFR BLD AUTO: 8.9 % (ref 19.6–45.3)
MCH RBC QN AUTO: 21.8 PG (ref 26.6–33)
MCH RBC QN AUTO: 22.4 PG (ref 26.6–33)
MCHC RBC AUTO-ENTMCNC: 29.9 G/DL (ref 31.5–35.7)
MCHC RBC AUTO-ENTMCNC: 30.1 G/DL (ref 31.5–35.7)
MCV RBC AUTO: 73.1 FL (ref 79–97)
MCV RBC AUTO: 74.4 FL (ref 79–97)
MONOCYTES # BLD AUTO: 0.12 10*3/MM3 (ref 0.1–0.9)
MONOCYTES NFR BLD AUTO: 1.6 % (ref 5–12)
NEUTROPHILS NFR BLD AUTO: 6.84 10*3/MM3 (ref 1.7–7)
NEUTROPHILS NFR BLD AUTO: 88.7 % (ref 42.7–76)
PLATELET # BLD AUTO: 61 10*3/MM3 (ref 140–450)
PLATELET # BLD AUTO: 92 10*3/MM3 (ref 140–450)
PROTHROMBIN TIME: 15.5 SECONDS (ref 11.7–14.2)
RBC # BLD AUTO: 3.16 10*6/MM3 (ref 3.77–5.28)
RBC # BLD AUTO: 3.44 10*6/MM3 (ref 3.77–5.28)
RH BLD: POSITIVE
SARS-COV-2 RNA PNL SPEC NAA+PROBE: NOT DETECTED
T&S EXPIRATION DATE: NORMAL
VWF CP INHIB PPP-ACNC: 6 UNITS/ML
WBC NRBC COR # BLD: 17.35 10*3/MM3 (ref 3.4–10.8)
WBC NRBC COR # BLD: 7.71 10*3/MM3 (ref 3.4–10.8)

## 2022-07-26 PROCEDURE — P9012 CRYOPRECIPITATE EACH UNIT: HCPCS

## 2022-07-26 PROCEDURE — 0 LIDOCAINE 1 % SOLUTION 20 ML VIAL: Performed by: STUDENT IN AN ORGANIZED HEALTH CARE EDUCATION/TRAINING PROGRAM

## 2022-07-26 PROCEDURE — 86850 RBC ANTIBODY SCREEN: CPT | Performed by: ANESTHESIOLOGY

## 2022-07-26 PROCEDURE — 86927 PLASMA FRESH FROZEN: CPT

## 2022-07-26 PROCEDURE — 85027 COMPLETE CBC AUTOMATED: CPT | Performed by: STUDENT IN AN ORGANIZED HEALTH CARE EDUCATION/TRAINING PROGRAM

## 2022-07-26 PROCEDURE — 99231 SBSQ HOSP IP/OBS SF/LOW 25: CPT | Performed by: INTERNAL MEDICINE

## 2022-07-26 PROCEDURE — 05PYX3Z REMOVAL OF INFUSION DEVICE FROM UPPER VEIN, EXTERNAL APPROACH: ICD-10-PCS | Performed by: STUDENT IN AN ORGANIZED HEALTH CARE EDUCATION/TRAINING PROGRAM

## 2022-07-26 PROCEDURE — 99232 SBSQ HOSP IP/OBS MODERATE 35: CPT | Performed by: INTERNAL MEDICINE

## 2022-07-26 PROCEDURE — 25010000002 CEFAZOLIN IN DEXTROSE 2-4 GM/100ML-% SOLUTION: Performed by: SURGERY

## 2022-07-26 PROCEDURE — 76000 FLUOROSCOPY <1 HR PHYS/QHP: CPT

## 2022-07-26 PROCEDURE — 86900 BLOOD TYPING SEROLOGIC ABO: CPT | Performed by: ANESTHESIOLOGY

## 2022-07-26 PROCEDURE — 25010000002 MIDAZOLAM PER 1 MG: Performed by: ANESTHESIOLOGY

## 2022-07-26 PROCEDURE — 85025 COMPLETE CBC W/AUTO DIFF WBC: CPT | Performed by: INTERNAL MEDICINE

## 2022-07-26 PROCEDURE — 25010000002 HEPARIN (PORCINE) PER 1000 UNITS: Performed by: STUDENT IN AN ORGANIZED HEALTH CARE EDUCATION/TRAINING PROGRAM

## 2022-07-26 PROCEDURE — 0JH63XZ INSERTION OF TUNNELED VASCULAR ACCESS DEVICE INTO CHEST SUBCUTANEOUS TISSUE AND FASCIA, PERCUTANEOUS APPROACH: ICD-10-PCS | Performed by: STUDENT IN AN ORGANIZED HEALTH CARE EDUCATION/TRAINING PROGRAM

## 2022-07-26 PROCEDURE — 86901 BLOOD TYPING SEROLOGIC RH(D): CPT | Performed by: ANESTHESIOLOGY

## 2022-07-26 PROCEDURE — 25010000002 DEXAMETHASONE PER 1 MG: Performed by: NURSE ANESTHETIST, CERTIFIED REGISTERED

## 2022-07-26 PROCEDURE — 87635 SARS-COV-2 COVID-19 AMP PRB: CPT | Performed by: STUDENT IN AN ORGANIZED HEALTH CARE EDUCATION/TRAINING PROGRAM

## 2022-07-26 PROCEDURE — 25010000002 PROPOFOL 10 MG/ML EMULSION: Performed by: NURSE ANESTHETIST, CERTIFIED REGISTERED

## 2022-07-26 PROCEDURE — C1750 CATH, HEMODIALYSIS,LONG-TERM: HCPCS | Performed by: STUDENT IN AN ORGANIZED HEALTH CARE EDUCATION/TRAINING PROGRAM

## 2022-07-26 PROCEDURE — 25010000002 ONDANSETRON PER 1 MG: Performed by: NURSE ANESTHETIST, CERTIFIED REGISTERED

## 2022-07-26 PROCEDURE — 83010 ASSAY OF HAPTOGLOBIN QUANT: CPT | Performed by: STUDENT IN AN ORGANIZED HEALTH CARE EDUCATION/TRAINING PROGRAM

## 2022-07-26 PROCEDURE — 85384 FIBRINOGEN ACTIVITY: CPT | Performed by: STUDENT IN AN ORGANIZED HEALTH CARE EDUCATION/TRAINING PROGRAM

## 2022-07-26 PROCEDURE — 02HV33Z INSERTION OF INFUSION DEVICE INTO SUPERIOR VENA CAVA, PERCUTANEOUS APPROACH: ICD-10-PCS | Performed by: STUDENT IN AN ORGANIZED HEALTH CARE EDUCATION/TRAINING PROGRAM

## 2022-07-26 PROCEDURE — C1894 INTRO/SHEATH, NON-LASER: HCPCS | Performed by: STUDENT IN AN ORGANIZED HEALTH CARE EDUCATION/TRAINING PROGRAM

## 2022-07-26 PROCEDURE — 86923 COMPATIBILITY TEST ELECTRIC: CPT

## 2022-07-26 PROCEDURE — 85610 PROTHROMBIN TIME: CPT | Performed by: INTERNAL MEDICINE

## 2022-07-26 RX ORDER — FENTANYL CITRATE 50 UG/ML
50 INJECTION, SOLUTION INTRAMUSCULAR; INTRAVENOUS
Status: DISCONTINUED | OUTPATIENT
Start: 2022-07-26 | End: 2022-07-26 | Stop reason: HOSPADM

## 2022-07-26 RX ORDER — PROMETHAZINE HYDROCHLORIDE 25 MG/1
25 TABLET ORAL ONCE AS NEEDED
Status: DISCONTINUED | OUTPATIENT
Start: 2022-07-26 | End: 2022-07-26 | Stop reason: HOSPADM

## 2022-07-26 RX ORDER — SODIUM CHLORIDE 0.9 % (FLUSH) 0.9 %
3 SYRINGE (ML) INJECTION EVERY 12 HOURS SCHEDULED
Status: DISCONTINUED | OUTPATIENT
Start: 2022-07-26 | End: 2022-07-26 | Stop reason: HOSPADM

## 2022-07-26 RX ORDER — MAGNESIUM HYDROXIDE 1200 MG/15ML
LIQUID ORAL AS NEEDED
Status: DISCONTINUED | OUTPATIENT
Start: 2022-07-26 | End: 2022-07-26 | Stop reason: HOSPADM

## 2022-07-26 RX ORDER — IBUPROFEN 600 MG/1
600 TABLET ORAL ONCE AS NEEDED
Status: DISCONTINUED | OUTPATIENT
Start: 2022-07-26 | End: 2022-07-26 | Stop reason: HOSPADM

## 2022-07-26 RX ORDER — HYDROCODONE BITARTRATE AND ACETAMINOPHEN 7.5; 325 MG/1; MG/1
1 TABLET ORAL ONCE AS NEEDED
Status: DISCONTINUED | OUTPATIENT
Start: 2022-07-26 | End: 2022-07-26 | Stop reason: HOSPADM

## 2022-07-26 RX ORDER — DIPHENHYDRAMINE HYDROCHLORIDE 50 MG/ML
12.5 INJECTION INTRAMUSCULAR; INTRAVENOUS
Status: DISCONTINUED | OUTPATIENT
Start: 2022-07-26 | End: 2022-07-26 | Stop reason: HOSPADM

## 2022-07-26 RX ORDER — DEXAMETHASONE SODIUM PHOSPHATE 10 MG/ML
INJECTION INTRAMUSCULAR; INTRAVENOUS AS NEEDED
Status: DISCONTINUED | OUTPATIENT
Start: 2022-07-26 | End: 2022-07-26 | Stop reason: SURG

## 2022-07-26 RX ORDER — PROMETHAZINE HYDROCHLORIDE 25 MG/1
25 SUPPOSITORY RECTAL ONCE AS NEEDED
Status: DISCONTINUED | OUTPATIENT
Start: 2022-07-26 | End: 2022-07-26 | Stop reason: HOSPADM

## 2022-07-26 RX ORDER — LABETALOL HYDROCHLORIDE 5 MG/ML
5 INJECTION, SOLUTION INTRAVENOUS
Status: DISCONTINUED | OUTPATIENT
Start: 2022-07-26 | End: 2022-07-26 | Stop reason: HOSPADM

## 2022-07-26 RX ORDER — FLUMAZENIL 0.1 MG/ML
0.2 INJECTION INTRAVENOUS AS NEEDED
Status: DISCONTINUED | OUTPATIENT
Start: 2022-07-26 | End: 2022-07-26 | Stop reason: HOSPADM

## 2022-07-26 RX ORDER — HYDROCODONE BITARTRATE AND ACETAMINOPHEN 7.5; 325 MG/1; MG/1
1 TABLET ORAL EVERY 4 HOURS PRN
Status: DISCONTINUED | OUTPATIENT
Start: 2022-07-26 | End: 2022-07-30 | Stop reason: HOSPADM

## 2022-07-26 RX ORDER — FAMOTIDINE 10 MG/ML
20 INJECTION, SOLUTION INTRAVENOUS ONCE
Status: COMPLETED | OUTPATIENT
Start: 2022-07-26 | End: 2022-07-26

## 2022-07-26 RX ORDER — EPHEDRINE SULFATE 50 MG/ML
5 INJECTION, SOLUTION INTRAVENOUS ONCE AS NEEDED
Status: DISCONTINUED | OUTPATIENT
Start: 2022-07-26 | End: 2022-07-26 | Stop reason: HOSPADM

## 2022-07-26 RX ORDER — DIPHENHYDRAMINE HCL 25 MG
25 CAPSULE ORAL
Status: DISCONTINUED | OUTPATIENT
Start: 2022-07-26 | End: 2022-07-26 | Stop reason: HOSPADM

## 2022-07-26 RX ORDER — PROPOFOL 10 MG/ML
VIAL (ML) INTRAVENOUS AS NEEDED
Status: DISCONTINUED | OUTPATIENT
Start: 2022-07-26 | End: 2022-07-26 | Stop reason: SURG

## 2022-07-26 RX ORDER — SODIUM CHLORIDE 9 MG/ML
INJECTION, SOLUTION INTRAVENOUS CONTINUOUS PRN
Status: DISCONTINUED | OUTPATIENT
Start: 2022-07-26 | End: 2022-07-26 | Stop reason: SURG

## 2022-07-26 RX ORDER — NALOXONE HCL 0.4 MG/ML
0.2 VIAL (ML) INJECTION AS NEEDED
Status: DISCONTINUED | OUTPATIENT
Start: 2022-07-26 | End: 2022-07-26 | Stop reason: HOSPADM

## 2022-07-26 RX ORDER — SODIUM CHLORIDE, SODIUM LACTATE, POTASSIUM CHLORIDE, CALCIUM CHLORIDE 600; 310; 30; 20 MG/100ML; MG/100ML; MG/100ML; MG/100ML
9 INJECTION, SOLUTION INTRAVENOUS CONTINUOUS
Status: DISCONTINUED | OUTPATIENT
Start: 2022-07-26 | End: 2022-07-26

## 2022-07-26 RX ORDER — LIDOCAINE HYDROCHLORIDE 20 MG/ML
INJECTION, SOLUTION INFILTRATION; PERINEURAL AS NEEDED
Status: DISCONTINUED | OUTPATIENT
Start: 2022-07-26 | End: 2022-07-26 | Stop reason: SURG

## 2022-07-26 RX ORDER — ONDANSETRON 2 MG/ML
4 INJECTION INTRAMUSCULAR; INTRAVENOUS ONCE AS NEEDED
Status: DISCONTINUED | OUTPATIENT
Start: 2022-07-26 | End: 2022-07-26 | Stop reason: HOSPADM

## 2022-07-26 RX ORDER — ONDANSETRON 2 MG/ML
INJECTION INTRAMUSCULAR; INTRAVENOUS AS NEEDED
Status: DISCONTINUED | OUTPATIENT
Start: 2022-07-26 | End: 2022-07-26 | Stop reason: SURG

## 2022-07-26 RX ORDER — MIDAZOLAM HYDROCHLORIDE 1 MG/ML
1 INJECTION INTRAMUSCULAR; INTRAVENOUS
Status: DISCONTINUED | OUTPATIENT
Start: 2022-07-26 | End: 2022-07-26 | Stop reason: HOSPADM

## 2022-07-26 RX ORDER — OXYCODONE AND ACETAMINOPHEN 7.5; 325 MG/1; MG/1
1 TABLET ORAL EVERY 4 HOURS PRN
Status: DISCONTINUED | OUTPATIENT
Start: 2022-07-26 | End: 2022-07-26 | Stop reason: HOSPADM

## 2022-07-26 RX ORDER — SODIUM CHLORIDE 0.9 % (FLUSH) 0.9 %
3-10 SYRINGE (ML) INJECTION AS NEEDED
Status: DISCONTINUED | OUTPATIENT
Start: 2022-07-26 | End: 2022-07-26 | Stop reason: HOSPADM

## 2022-07-26 RX ORDER — LIDOCAINE HYDROCHLORIDE 10 MG/ML
0.5 INJECTION, SOLUTION EPIDURAL; INFILTRATION; INTRACAUDAL; PERINEURAL ONCE AS NEEDED
Status: DISCONTINUED | OUTPATIENT
Start: 2022-07-26 | End: 2022-07-26 | Stop reason: HOSPADM

## 2022-07-26 RX ORDER — CEFAZOLIN SODIUM 2 G/100ML
2 INJECTION, SOLUTION INTRAVENOUS ONCE
Status: COMPLETED | OUTPATIENT
Start: 2022-07-26 | End: 2022-07-26

## 2022-07-26 RX ORDER — HYDRALAZINE HYDROCHLORIDE 20 MG/ML
5 INJECTION INTRAMUSCULAR; INTRAVENOUS
Status: DISCONTINUED | OUTPATIENT
Start: 2022-07-26 | End: 2022-07-26 | Stop reason: HOSPADM

## 2022-07-26 RX ORDER — HYDROMORPHONE HYDROCHLORIDE 1 MG/ML
0.5 INJECTION, SOLUTION INTRAMUSCULAR; INTRAVENOUS; SUBCUTANEOUS
Status: DISCONTINUED | OUTPATIENT
Start: 2022-07-26 | End: 2022-07-30 | Stop reason: HOSPADM

## 2022-07-26 RX ORDER — HYDROMORPHONE HYDROCHLORIDE 1 MG/ML
0.5 INJECTION, SOLUTION INTRAMUSCULAR; INTRAVENOUS; SUBCUTANEOUS
Status: DISCONTINUED | OUTPATIENT
Start: 2022-07-26 | End: 2022-07-26 | Stop reason: HOSPADM

## 2022-07-26 RX ADMIN — ONDANSETRON 4 MG: 2 INJECTION INTRAMUSCULAR; INTRAVENOUS at 09:46

## 2022-07-26 RX ADMIN — DEXAMETHASONE SODIUM PHOSPHATE 8 MG: 10 INJECTION INTRAMUSCULAR; INTRAVENOUS at 09:23

## 2022-07-26 RX ADMIN — MIDAZOLAM 1 MG: 1 INJECTION INTRAMUSCULAR; INTRAVENOUS at 08:52

## 2022-07-26 RX ADMIN — PROPOFOL 200 MG: 10 INJECTION, EMULSION INTRAVENOUS at 09:13

## 2022-07-26 RX ADMIN — LIDOCAINE HYDROCHLORIDE 60 MG: 20 INJECTION, SOLUTION INFILTRATION; PERINEURAL at 09:13

## 2022-07-26 RX ADMIN — SODIUM CHLORIDE: 9 INJECTION, SOLUTION INTRAVENOUS at 09:01

## 2022-07-26 RX ADMIN — CEFAZOLIN SODIUM 2 G: 2 INJECTION, SOLUTION INTRAVENOUS at 09:01

## 2022-07-26 RX ADMIN — FAMOTIDINE 20 MG: 10 INJECTION INTRAVENOUS at 08:52

## 2022-07-26 RX ADMIN — GUAIFENESIN 600 MG: 600 TABLET, EXTENDED RELEASE ORAL at 22:14

## 2022-07-26 RX ADMIN — CARVEDILOL 3.12 MG: 3.12 TABLET, FILM COATED ORAL at 07:36

## 2022-07-26 NOTE — ANESTHESIA PREPROCEDURE EVALUATION
Anesthesia Evaluation     no history of anesthetic complications:               Airway   Mallampati: II  TM distance: >3 FB  Neck ROM: full  No difficulty expected  Dental - normal exam     Pulmonary    (+) a smoker Former Abstained day of surgery,   (-) COPD, shortness of breath, sleep apnea, rhonchi, decreased breath sounds, wheezes  Cardiovascular   Exercise tolerance: good (4-7 METS)    Rhythm: regular  Rate: normal    (+) hypertension (on cardene 0.5 mg/hour ) poorly controlled 2 medications or greater,   (-) valvular problems/murmurs, past MI, dysrhythmias, murmur      Neuro/Psych  (-) seizures, CVA  GI/Hepatic/Renal/Endo    (+)   renal disease (ESRD from HTN and possibly SLE/TTP. Had limited HD yesterday makes adequate urine ),   (-) liver disease, diabetes, no thyroid disorder    Musculoskeletal     Abdominal     Abdomen: soft.   Substance History      OB/GYN          Other   chronic steroid use (On steroids for TTP recently started )        Other Comment: On steroids for TTP                 Anesthesia Plan    ASA 4     MAC     intravenous induction     Anesthetic plan, risks, benefits, and alternatives have been provided, discussed and informed consent has been obtained with: patient.        CODE STATUS:    Code Status (Patient has no pulse and is not breathing): CPR (Attempt to Resuscitate)  Medical Interventions (Patient has pulse or is breathing): Full

## 2022-07-26 NOTE — ANESTHESIA POSTPROCEDURE EVALUATION
Patient: Dee Herrera    Procedure Summary     Date: 07/26/22 Room / Location: [facility] OR  / [facility] MAIN OR    Anesthesia Start: 0901 Anesthesia Stop: 1009    Procedure: RIGHT TUNNELED DIALYSIS CATHETER PLACEMENT (N/A ) Diagnosis:     Surgeons: Diandra Adhikari MD Provider: Connor Matamoros MD    Anesthesia Type: MAC ASA Status: 4          Anesthesia Type: MAC    Vitals  Vitals Value Taken Time   /100 07/26/22 1101   Temp 36.4 °C (97.6 °F) 07/26/22 1005   Pulse 57 07/26/22 1107   Resp 13 07/26/22 1100   SpO2 100 % 07/26/22 1107   Vitals shown include unvalidated device data.        Post Anesthesia Care and Evaluation    Patient location during evaluation: PACU  Patient participation: complete - patient participated  Level of consciousness: awake and alert  Pain management: adequate    Airway patency: patent  Anesthetic complications: No anesthetic complications    Cardiovascular status: acceptable  Respiratory status: acceptable  Hydration status: acceptable    Comments: --------------------            07/26/22               1100     --------------------   BP:      149/100     Pulse:      56       Resp:       13       Temp:                SpO2:      100%     --------------------

## 2022-07-26 NOTE — ANESTHESIA PROCEDURE NOTES
Airway  Urgency: elective    Date/Time: 7/26/2022 9:15 AM    General Information and Staff    Patient location during procedure: OR  Anesthesiologist: Connor Matamoros MD  CRNA/CAA: Vito Bowman CRNA    Indications and Patient Condition  Indications for airway management: airway protection    Preoxygenated: yes  MILS maintained throughout  Mask difficulty assessment: 1 - vent by mask    Final Airway Details  Final airway type: supraglottic airway      Successful airway: unique  Size 4    Number of attempts at approach: 1  Assessment: lips, teeth, and gum same as pre-op and atraumatic intubation

## 2022-07-27 ENCOUNTER — APPOINTMENT (OUTPATIENT)
Dept: CT IMAGING | Facility: HOSPITAL | Age: 32
End: 2022-07-27

## 2022-07-27 LAB
ANISOCYTOSIS BLD QL: ABNORMAL
BH BB BLOOD EXPIRATION DATE: NORMAL
BH BB BLOOD EXPIRATION DATE: NORMAL
BH BB BLOOD TYPE BARCODE: 6200
BH BB BLOOD TYPE BARCODE: 7300
BH BB DISPENSE STATUS: NORMAL
BH BB DISPENSE STATUS: NORMAL
BH BB PRODUCT CODE: NORMAL
BH BB PRODUCT CODE: NORMAL
BH BB UNIT NUMBER: NORMAL
BH BB UNIT NUMBER: NORMAL
C-ANCA TITR SER IF: ABNORMAL TITER
DEPRECATED RDW RBC AUTO: 68.9 FL (ref 37–54)
ERYTHROCYTE [DISTWIDTH] IN BLOOD BY AUTOMATED COUNT: 26 % (ref 12.3–15.4)
FIBRINOGEN PPP-MCNC: 230 MG/DL (ref 219–464)
HAPTOGLOB SERPL-MCNC: 82 MG/DL (ref 30–200)
HCT VFR BLD AUTO: 24.8 % (ref 34–46.6)
HGB BLD-MCNC: 7.3 G/DL (ref 12–15.9)
LYMPHOCYTES # BLD MANUAL: 1.97 10*3/MM3 (ref 0.7–3.1)
LYMPHOCYTES NFR BLD MANUAL: 4.4 % (ref 5–12)
MCH RBC QN AUTO: 22.3 PG (ref 26.6–33)
MCHC RBC AUTO-ENTMCNC: 29.4 G/DL (ref 31.5–35.7)
MCV RBC AUTO: 75.8 FL (ref 79–97)
MICROCYTES BLD QL: ABNORMAL
MONOCYTES # BLD: 0.51 10*3/MM3 (ref 0.1–0.9)
MYELOPEROXIDASE AB SER IA-ACNC: <0.2 UNITS (ref 0–0.9)
NEUTROPHILS # BLD AUTO: 9.17 10*3/MM3 (ref 1.7–7)
NEUTROPHILS NFR BLD MANUAL: 78.6 % (ref 42.7–76)
P-ANCA ATYPICAL TITR SER IF: ABNORMAL TITER
P-ANCA TITR SER IF: ABNORMAL TITER
PLAT MORPH BLD: NORMAL
PLATELET # BLD AUTO: 60 10*3/MM3 (ref 140–450)
POIKILOCYTOSIS BLD QL SMEAR: ABNORMAL
PROTEINASE3 AB SER IA-ACNC: <0.2 UNITS (ref 0–0.9)
RBC # BLD AUTO: 3.27 10*6/MM3 (ref 3.77–5.28)
SCHISTOCYTES BLD QL SMEAR: ABNORMAL
UNIT  ABO: NORMAL
UNIT  ABO: NORMAL
UNIT  RH: NORMAL
UNIT  RH: NORMAL
VARIANT LYMPHS NFR BLD MANUAL: 16.9 % (ref 19.6–45.3)
WBC MORPH BLD: NORMAL
WBC NRBC COR # BLD: 11.67 10*3/MM3 (ref 3.4–10.8)

## 2022-07-27 PROCEDURE — 99231 SBSQ HOSP IP/OBS SF/LOW 25: CPT | Performed by: INTERNAL MEDICINE

## 2022-07-27 PROCEDURE — 99152 MOD SED SAME PHYS/QHP 5/>YRS: CPT

## 2022-07-27 PROCEDURE — 0 LIDOCAINE 1 % SOLUTION: Performed by: RADIOLOGY

## 2022-07-27 PROCEDURE — 0TB13ZX EXCISION OF LEFT KIDNEY, PERCUTANEOUS APPROACH, DIAGNOSTIC: ICD-10-PCS | Performed by: INTERNAL MEDICINE

## 2022-07-27 PROCEDURE — 83010 ASSAY OF HAPTOGLOBIN QUANT: CPT | Performed by: STUDENT IN AN ORGANIZED HEALTH CARE EDUCATION/TRAINING PROGRAM

## 2022-07-27 PROCEDURE — 85384 FIBRINOGEN ACTIVITY: CPT | Performed by: STUDENT IN AN ORGANIZED HEALTH CARE EDUCATION/TRAINING PROGRAM

## 2022-07-27 PROCEDURE — 85007 BL SMEAR W/DIFF WBC COUNT: CPT | Performed by: STUDENT IN AN ORGANIZED HEALTH CARE EDUCATION/TRAINING PROGRAM

## 2022-07-27 PROCEDURE — 25010000002 MIDAZOLAM PER 1 MG: Performed by: RADIOLOGY

## 2022-07-27 PROCEDURE — 85025 COMPLETE CBC W/AUTO DIFF WBC: CPT | Performed by: STUDENT IN AN ORGANIZED HEALTH CARE EDUCATION/TRAINING PROGRAM

## 2022-07-27 PROCEDURE — 25010000002 FENTANYL CITRATE (PF) 50 MCG/ML SOLUTION: Performed by: RADIOLOGY

## 2022-07-27 PROCEDURE — 77012 CT SCAN FOR NEEDLE BIOPSY: CPT

## 2022-07-27 PROCEDURE — 99232 SBSQ HOSP IP/OBS MODERATE 35: CPT | Performed by: NURSE PRACTITIONER

## 2022-07-27 PROCEDURE — 88300 SURGICAL PATH GROSS: CPT | Performed by: INTERNAL MEDICINE

## 2022-07-27 RX ORDER — FENTANYL CITRATE 50 UG/ML
INJECTION, SOLUTION INTRAMUSCULAR; INTRAVENOUS
Status: COMPLETED | OUTPATIENT
Start: 2022-07-27 | End: 2022-07-27

## 2022-07-27 RX ORDER — MIDAZOLAM HYDROCHLORIDE 1 MG/ML
INJECTION INTRAMUSCULAR; INTRAVENOUS
Status: COMPLETED | OUTPATIENT
Start: 2022-07-27 | End: 2022-07-27

## 2022-07-27 RX ORDER — LIDOCAINE HYDROCHLORIDE 10 MG/ML
20 INJECTION, SOLUTION INFILTRATION; PERINEURAL ONCE
Status: COMPLETED | OUTPATIENT
Start: 2022-07-27 | End: 2022-07-27

## 2022-07-27 RX ADMIN — CARVEDILOL 3.12 MG: 3.12 TABLET, FILM COATED ORAL at 21:28

## 2022-07-27 RX ADMIN — FENTANYL CITRATE 50 MCG: 50 INJECTION INTRAMUSCULAR; INTRAVENOUS at 13:58

## 2022-07-27 RX ADMIN — MINOXIDIL 10 MG: 10 TABLET ORAL at 09:40

## 2022-07-27 RX ADMIN — PANTOPRAZOLE SODIUM 40 MG: 40 TABLET, DELAYED RELEASE ORAL at 09:42

## 2022-07-27 RX ADMIN — CARVEDILOL 3.12 MG: 3.12 TABLET, FILM COATED ORAL at 09:40

## 2022-07-27 RX ADMIN — MINOXIDIL 10 MG: 10 TABLET ORAL at 21:28

## 2022-07-27 RX ADMIN — AMLODIPINE BESYLATE 10 MG: 10 TABLET ORAL at 09:39

## 2022-07-27 RX ADMIN — LIDOCAINE HYDROCHLORIDE 20 ML: 10 INJECTION, SOLUTION INFILTRATION; PERINEURAL at 14:00

## 2022-07-27 RX ADMIN — MIDAZOLAM 1 MG: 1 INJECTION INTRAMUSCULAR; INTRAVENOUS at 13:58

## 2022-07-27 RX ADMIN — MINOXIDIL 10 MG: 10 TABLET ORAL at 05:34

## 2022-07-27 RX ADMIN — SEVELAMER CARBONATE 800 MG: 800 TABLET, FILM COATED ORAL at 21:47

## 2022-07-27 RX ADMIN — CIPROFLOXACIN AND DEXAMETHASONE 4 DROP: 3; 1 SUSPENSION/ DROPS AURICULAR (OTIC) at 09:42

## 2022-07-28 LAB
ALBUMIN SERPL-MCNC: 2.6 G/DL (ref 3.5–5.2)
ALBUMIN/GLOB SERPL: 1 G/DL
ALP SERPL-CCNC: 44 U/L (ref 39–117)
ALT SERPL W P-5'-P-CCNC: 7 U/L (ref 1–33)
ANION GAP SERPL CALCULATED.3IONS-SCNC: 11.1 MMOL/L (ref 5–15)
ANISOCYTOSIS BLD QL: ABNORMAL
ANISOCYTOSIS BLD QL: ABNORMAL
AST SERPL-CCNC: 13 U/L (ref 1–32)
BILIRUB SERPL-MCNC: 0.2 MG/DL (ref 0–1.2)
BUN SERPL-MCNC: 41 MG/DL (ref 6–20)
BUN/CREAT SERPL: 6.3 (ref 7–25)
C3 FRG RBC-MCNC: ABNORMAL
CALCIUM SPEC-SCNC: 7.7 MG/DL (ref 8.6–10.5)
CHLORIDE SERPL-SCNC: 99 MMOL/L (ref 98–107)
CO2 SERPL-SCNC: 24.9 MMOL/L (ref 22–29)
CREAT SERPL-MCNC: 6.52 MG/DL (ref 0.57–1)
DACRYOCYTES BLD QL SMEAR: ABNORMAL
DEPRECATED RDW RBC AUTO: 65 FL (ref 37–54)
DEPRECATED RDW RBC AUTO: 67.7 FL (ref 37–54)
EGFRCR SERPLBLD CKD-EPI 2021: 8.2 ML/MIN/1.73
ERYTHROCYTE [DISTWIDTH] IN BLOOD BY AUTOMATED COUNT: 25.6 % (ref 12.3–15.4)
ERYTHROCYTE [DISTWIDTH] IN BLOOD BY AUTOMATED COUNT: 25.8 % (ref 12.3–15.4)
FIBRINOGEN PPP-MCNC: 251 MG/DL (ref 219–464)
GLOBULIN UR ELPH-MCNC: 2.5 GM/DL
GLUCOSE SERPL-MCNC: 88 MG/DL (ref 65–99)
HAPTOGLOB SERPL-MCNC: 89 MG/DL (ref 30–200)
HCT VFR BLD AUTO: 21.8 % (ref 34–46.6)
HCT VFR BLD AUTO: 24.2 % (ref 34–46.6)
HGB BLD-MCNC: 6.8 G/DL (ref 12–15.9)
HGB BLD-MCNC: 7.4 G/DL (ref 12–15.9)
HYPOCHROMIA BLD QL: ABNORMAL
INR PPP: 1.17 (ref 0.9–1.1)
LYMPHOCYTES # BLD MANUAL: 0.86 10*3/MM3 (ref 0.7–3.1)
LYMPHOCYTES # BLD MANUAL: 1.2 10*3/MM3 (ref 0.7–3.1)
LYMPHOCYTES NFR BLD MANUAL: 3.1 % (ref 5–12)
LYMPHOCYTES NFR BLD MANUAL: 5.1 % (ref 5–12)
MCH RBC QN AUTO: 22.7 PG (ref 26.6–33)
MCH RBC QN AUTO: 22.9 PG (ref 26.6–33)
MCHC RBC AUTO-ENTMCNC: 30.6 G/DL (ref 31.5–35.7)
MCHC RBC AUTO-ENTMCNC: 31.2 G/DL (ref 31.5–35.7)
MCV RBC AUTO: 72.7 FL (ref 79–97)
MCV RBC AUTO: 74.9 FL (ref 79–97)
MICROCYTES BLD QL: ABNORMAL
MICROCYTES BLD QL: ABNORMAL
MONOCYTES # BLD: 0.29 10*3/MM3 (ref 0.1–0.9)
MONOCYTES # BLD: 0.46 10*3/MM3 (ref 0.1–0.9)
NEUTROPHILS # BLD AUTO: 7.39 10*3/MM3 (ref 1.7–7)
NEUTROPHILS # BLD AUTO: 8.13 10*3/MM3 (ref 1.7–7)
NEUTROPHILS NFR BLD MANUAL: 81.6 % (ref 42.7–76)
NEUTROPHILS NFR BLD MANUAL: 87.6 % (ref 42.7–76)
OVALOCYTES BLD QL SMEAR: ABNORMAL
PLAT MORPH BLD: NORMAL
PLAT MORPH BLD: NORMAL
PLATELET # BLD AUTO: 81 10*3/MM3 (ref 140–450)
PLATELET # BLD AUTO: 88 10*3/MM3 (ref 140–450)
POIKILOCYTOSIS BLD QL SMEAR: ABNORMAL
POIKILOCYTOSIS BLD QL SMEAR: ABNORMAL
POTASSIUM SERPL-SCNC: 4.2 MMOL/L (ref 3.5–5.2)
PROT SERPL-MCNC: 5.1 G/DL (ref 6–8.5)
PROTHROMBIN TIME: 14.8 SECONDS (ref 11.7–14.2)
RBC # BLD AUTO: 3 10*6/MM3 (ref 3.77–5.28)
RBC # BLD AUTO: 3.23 10*6/MM3 (ref 3.77–5.28)
SCHISTOCYTES BLD QL SMEAR: ABNORMAL
SODIUM SERPL-SCNC: 135 MMOL/L (ref 136–145)
VARIANT LYMPHS NFR BLD MANUAL: 13.3 % (ref 19.6–45.3)
VARIANT LYMPHS NFR BLD MANUAL: 9.3 % (ref 19.6–45.3)
WBC MORPH BLD: NORMAL
WBC MORPH BLD: NORMAL
WBC NRBC COR # BLD: 9.06 10*3/MM3 (ref 3.4–10.8)
WBC NRBC COR # BLD: 9.28 10*3/MM3 (ref 3.4–10.8)

## 2022-07-28 PROCEDURE — 85610 PROTHROMBIN TIME: CPT | Performed by: STUDENT IN AN ORGANIZED HEALTH CARE EDUCATION/TRAINING PROGRAM

## 2022-07-28 PROCEDURE — 63710000001 DIPHENHYDRAMINE PER 50 MG: Performed by: INTERNAL MEDICINE

## 2022-07-28 PROCEDURE — 99231 SBSQ HOSP IP/OBS SF/LOW 25: CPT | Performed by: NURSE PRACTITIONER

## 2022-07-28 PROCEDURE — 85384 FIBRINOGEN ACTIVITY: CPT | Performed by: STUDENT IN AN ORGANIZED HEALTH CARE EDUCATION/TRAINING PROGRAM

## 2022-07-28 PROCEDURE — 63710000001 PREDNISONE PER 5 MG: Performed by: INTERNAL MEDICINE

## 2022-07-28 PROCEDURE — 85025 COMPLETE CBC W/AUTO DIFF WBC: CPT | Performed by: INTERNAL MEDICINE

## 2022-07-28 PROCEDURE — 85007 BL SMEAR W/DIFF WBC COUNT: CPT | Performed by: STUDENT IN AN ORGANIZED HEALTH CARE EDUCATION/TRAINING PROGRAM

## 2022-07-28 PROCEDURE — 83010 ASSAY OF HAPTOGLOBIN QUANT: CPT | Performed by: STUDENT IN AN ORGANIZED HEALTH CARE EDUCATION/TRAINING PROGRAM

## 2022-07-28 PROCEDURE — 80053 COMPREHEN METABOLIC PANEL: CPT | Performed by: STUDENT IN AN ORGANIZED HEALTH CARE EDUCATION/TRAINING PROGRAM

## 2022-07-28 PROCEDURE — 25010000002 ONDANSETRON PER 1 MG: Performed by: STUDENT IN AN ORGANIZED HEALTH CARE EDUCATION/TRAINING PROGRAM

## 2022-07-28 PROCEDURE — 25010000002 NA FERRIC GLUC CPLX PER 12.5 MG: Performed by: INTERNAL MEDICINE

## 2022-07-28 PROCEDURE — 85007 BL SMEAR W/DIFF WBC COUNT: CPT | Performed by: INTERNAL MEDICINE

## 2022-07-28 PROCEDURE — 85025 COMPLETE CBC W/AUTO DIFF WBC: CPT | Performed by: STUDENT IN AN ORGANIZED HEALTH CARE EDUCATION/TRAINING PROGRAM

## 2022-07-28 RX ORDER — ACETAMINOPHEN 325 MG/1
650 TABLET ORAL ONCE
Status: COMPLETED | OUTPATIENT
Start: 2022-07-28 | End: 2022-07-28

## 2022-07-28 RX ORDER — DIPHENHYDRAMINE HCL 25 MG
50 CAPSULE ORAL ONCE
Status: COMPLETED | OUTPATIENT
Start: 2022-07-28 | End: 2022-07-28

## 2022-07-28 RX ADMIN — PANTOPRAZOLE SODIUM 40 MG: 40 TABLET, DELAYED RELEASE ORAL at 07:03

## 2022-07-28 RX ADMIN — AMLODIPINE BESYLATE 10 MG: 10 TABLET ORAL at 09:59

## 2022-07-28 RX ADMIN — DIPHENHYDRAMINE HYDROCHLORIDE 50 MG: 25 CAPSULE ORAL at 10:48

## 2022-07-28 RX ADMIN — SEVELAMER CARBONATE 800 MG: 800 TABLET, FILM COATED ORAL at 11:46

## 2022-07-28 RX ADMIN — SODIUM CHLORIDE 250 MG: 9 INJECTION, SOLUTION INTRAVENOUS at 10:49

## 2022-07-28 RX ADMIN — ACETAMINOPHEN 650 MG: 325 TABLET, FILM COATED ORAL at 10:48

## 2022-07-28 RX ADMIN — DOCUSATE SODIUM 50MG AND SENNOSIDES 8.6MG 2 TABLET: 8.6; 5 TABLET, FILM COATED ORAL at 20:22

## 2022-07-28 RX ADMIN — SEVELAMER CARBONATE 800 MG: 800 TABLET, FILM COATED ORAL at 18:25

## 2022-07-28 RX ADMIN — CIPROFLOXACIN AND DEXAMETHASONE 4 DROP: 3; 1 SUSPENSION/ DROPS AURICULAR (OTIC) at 09:57

## 2022-07-28 RX ADMIN — GUAIFENESIN 600 MG: 600 TABLET, EXTENDED RELEASE ORAL at 20:22

## 2022-07-28 RX ADMIN — ONDANSETRON 4 MG: 2 INJECTION INTRAMUSCULAR; INTRAVENOUS at 10:48

## 2022-07-28 RX ADMIN — MINOXIDIL 10 MG: 10 TABLET ORAL at 20:22

## 2022-07-28 RX ADMIN — CARVEDILOL 3.12 MG: 3.12 TABLET, FILM COATED ORAL at 09:59

## 2022-07-28 RX ADMIN — Medication 10 ML: at 20:23

## 2022-07-28 RX ADMIN — CIPROFLOXACIN AND DEXAMETHASONE 4 DROP: 3; 1 SUSPENSION/ DROPS AURICULAR (OTIC) at 20:34

## 2022-07-28 RX ADMIN — PREDNISONE 60 MG: 50 TABLET ORAL at 10:48

## 2022-07-28 RX ADMIN — CARVEDILOL 3.12 MG: 3.12 TABLET, FILM COATED ORAL at 18:25

## 2022-07-28 RX ADMIN — MINOXIDIL 10 MG: 10 TABLET ORAL at 09:59

## 2022-07-29 LAB
25(OH)D3 SERPL-MCNC: 16.7 NG/ML (ref 30–100)
ALBUMIN SERPL-MCNC: 2.8 G/DL (ref 3.5–5.2)
ALBUMIN SERPL-MCNC: 2.8 G/DL (ref 3.5–5.2)
ALBUMIN/GLOB SERPL: 1.1 G/DL
ALBUMIN/GLOB SERPL: 1.2 G/DL
ALP SERPL-CCNC: 45 U/L (ref 39–117)
ALP SERPL-CCNC: 46 U/L (ref 39–117)
ALT SERPL W P-5'-P-CCNC: 5 U/L (ref 1–33)
ALT SERPL W P-5'-P-CCNC: 6 U/L (ref 1–33)
ANION GAP SERPL CALCULATED.3IONS-SCNC: 12.2 MMOL/L (ref 5–15)
ANION GAP SERPL CALCULATED.3IONS-SCNC: 13 MMOL/L (ref 5–15)
ANISOCYTOSIS BLD QL: ABNORMAL
AST SERPL-CCNC: 12 U/L (ref 1–32)
AST SERPL-CCNC: 12 U/L (ref 1–32)
BILIRUB SERPL-MCNC: 0.2 MG/DL (ref 0–1.2)
BILIRUB SERPL-MCNC: 0.2 MG/DL (ref 0–1.2)
BUN SERPL-MCNC: 52 MG/DL (ref 6–20)
BUN SERPL-MCNC: 53 MG/DL (ref 6–20)
BUN/CREAT SERPL: 6.8 (ref 7–25)
BUN/CREAT SERPL: 6.8 (ref 7–25)
C3 FRG RBC-MCNC: ABNORMAL
CALCIUM SPEC-SCNC: 7.8 MG/DL (ref 8.6–10.5)
CALCIUM SPEC-SCNC: 7.9 MG/DL (ref 8.6–10.5)
CHLORIDE SERPL-SCNC: 100 MMOL/L (ref 98–107)
CHLORIDE SERPL-SCNC: 99 MMOL/L (ref 98–107)
CO2 SERPL-SCNC: 22.8 MMOL/L (ref 22–29)
CO2 SERPL-SCNC: 23 MMOL/L (ref 22–29)
CREAT SERPL-MCNC: 7.69 MG/DL (ref 0.57–1)
CREAT SERPL-MCNC: 7.77 MG/DL (ref 0.57–1)
DACRYOCYTES BLD QL SMEAR: ABNORMAL
DEPRECATED RDW RBC AUTO: 66.3 FL (ref 37–54)
EGFRCR SERPLBLD CKD-EPI 2021: 6.7 ML/MIN/1.73
EGFRCR SERPLBLD CKD-EPI 2021: 6.7 ML/MIN/1.73
ERYTHROCYTE [DISTWIDTH] IN BLOOD BY AUTOMATED COUNT: 26.5 % (ref 12.3–15.4)
GLOBULIN UR ELPH-MCNC: 2.4 GM/DL
GLOBULIN UR ELPH-MCNC: 2.6 GM/DL
GLUCOSE SERPL-MCNC: 101 MG/DL (ref 65–99)
GLUCOSE SERPL-MCNC: 106 MG/DL (ref 65–99)
HAPTOGLOB SERPL-MCNC: 126 MG/DL (ref 30–200)
HCT VFR BLD AUTO: 21.7 % (ref 34–46.6)
HCT VFR BLD AUTO: 28.5 % (ref 34–46.6)
HGB BLD-MCNC: 6.7 G/DL (ref 12–15.9)
HGB BLD-MCNC: 8.9 G/DL (ref 12–15.9)
LAB AP CASE REPORT: NORMAL
LAB AP CLINICAL INFORMATION: NORMAL
LYMPHOCYTES # BLD MANUAL: 0.43 10*3/MM3 (ref 0.7–3.1)
LYMPHOCYTES NFR BLD MANUAL: 1 % (ref 5–12)
MCH RBC QN AUTO: 22.4 PG (ref 26.6–33)
MCHC RBC AUTO-ENTMCNC: 30.9 G/DL (ref 31.5–35.7)
MCV RBC AUTO: 72.6 FL (ref 79–97)
MICROCYTES BLD QL: ABNORMAL
MONOCYTES # BLD: 0.11 10*3/MM3 (ref 0.1–0.9)
NEUTROPHILS # BLD AUTO: 10.22 10*3/MM3 (ref 1.7–7)
NEUTROPHILS NFR BLD MANUAL: 94.9 % (ref 42.7–76)
OVALOCYTES BLD QL SMEAR: ABNORMAL
PATH REPORT.FINAL DX SPEC: NORMAL
PATH REPORT.GROSS SPEC: NORMAL
PLAT MORPH BLD: NORMAL
PLATELET # BLD AUTO: 120 10*3/MM3 (ref 140–450)
POIKILOCYTOSIS BLD QL SMEAR: ABNORMAL
POLYCHROMASIA BLD QL SMEAR: ABNORMAL
POTASSIUM SERPL-SCNC: 4.8 MMOL/L (ref 3.5–5.2)
POTASSIUM SERPL-SCNC: 4.8 MMOL/L (ref 3.5–5.2)
PROT SERPL-MCNC: 5.2 G/DL (ref 6–8.5)
PROT SERPL-MCNC: 5.4 G/DL (ref 6–8.5)
RBC # BLD AUTO: 2.99 10*6/MM3 (ref 3.77–5.28)
SCHISTOCYTES BLD QL SMEAR: ABNORMAL
SODIUM SERPL-SCNC: 135 MMOL/L (ref 136–145)
SODIUM SERPL-SCNC: 135 MMOL/L (ref 136–145)
VARIANT LYMPHS NFR BLD MANUAL: 4 % (ref 19.6–45.3)
WBC MORPH BLD: NORMAL
WBC NRBC COR # BLD: 10.77 10*3/MM3 (ref 3.4–10.8)

## 2022-07-29 PROCEDURE — 80053 COMPREHEN METABOLIC PANEL: CPT | Performed by: STUDENT IN AN ORGANIZED HEALTH CARE EDUCATION/TRAINING PROGRAM

## 2022-07-29 PROCEDURE — 85025 COMPLETE CBC W/AUTO DIFF WBC: CPT | Performed by: STUDENT IN AN ORGANIZED HEALTH CARE EDUCATION/TRAINING PROGRAM

## 2022-07-29 PROCEDURE — P9040 RBC LEUKOREDUCED IRRADIATED: HCPCS

## 2022-07-29 PROCEDURE — 99231 SBSQ HOSP IP/OBS SF/LOW 25: CPT | Performed by: INTERNAL MEDICINE

## 2022-07-29 PROCEDURE — 25010000002 HEPARIN (PORCINE) PER 1000 UNITS: Performed by: INTERNAL MEDICINE

## 2022-07-29 PROCEDURE — 85007 BL SMEAR W/DIFF WBC COUNT: CPT | Performed by: STUDENT IN AN ORGANIZED HEALTH CARE EDUCATION/TRAINING PROGRAM

## 2022-07-29 PROCEDURE — 80053 COMPREHEN METABOLIC PANEL: CPT | Performed by: INTERNAL MEDICINE

## 2022-07-29 PROCEDURE — P9016 RBC LEUKOCYTES REDUCED: HCPCS

## 2022-07-29 PROCEDURE — 85018 HEMOGLOBIN: CPT | Performed by: STUDENT IN AN ORGANIZED HEALTH CARE EDUCATION/TRAINING PROGRAM

## 2022-07-29 PROCEDURE — 86900 BLOOD TYPING SEROLOGIC ABO: CPT

## 2022-07-29 PROCEDURE — 63710000001 MYCOPHENOLATE MOFETIL PER 250 MG: Performed by: INTERNAL MEDICINE

## 2022-07-29 PROCEDURE — 82306 VITAMIN D 25 HYDROXY: CPT | Performed by: STUDENT IN AN ORGANIZED HEALTH CARE EDUCATION/TRAINING PROGRAM

## 2022-07-29 PROCEDURE — 99232 SBSQ HOSP IP/OBS MODERATE 35: CPT | Performed by: NURSE PRACTITIONER

## 2022-07-29 PROCEDURE — 63710000001 PREDNISONE PER 5 MG: Performed by: INTERNAL MEDICINE

## 2022-07-29 PROCEDURE — 85014 HEMATOCRIT: CPT | Performed by: STUDENT IN AN ORGANIZED HEALTH CARE EDUCATION/TRAINING PROGRAM

## 2022-07-29 PROCEDURE — 83010 ASSAY OF HAPTOGLOBIN QUANT: CPT | Performed by: STUDENT IN AN ORGANIZED HEALTH CARE EDUCATION/TRAINING PROGRAM

## 2022-07-29 RX ORDER — HEPARIN SODIUM 1000 [USP'U]/ML
3800 INJECTION, SOLUTION INTRAVENOUS; SUBCUTANEOUS AS NEEDED
Status: DISCONTINUED | OUTPATIENT
Start: 2022-07-29 | End: 2022-07-30 | Stop reason: HOSPADM

## 2022-07-29 RX ORDER — ERGOCALCIFEROL 1.25 MG/1
50000 CAPSULE ORAL
Status: DISCONTINUED | OUTPATIENT
Start: 2022-07-29 | End: 2022-07-30 | Stop reason: HOSPADM

## 2022-07-29 RX ORDER — MYCOPHENOLATE MOFETIL 250 MG/1
1000 CAPSULE ORAL EVERY 12 HOURS SCHEDULED
Status: DISCONTINUED | OUTPATIENT
Start: 2022-07-29 | End: 2022-07-30 | Stop reason: HOSPADM

## 2022-07-29 RX ADMIN — PREDNISONE 60 MG: 50 TABLET ORAL at 15:39

## 2022-07-29 RX ADMIN — MINOXIDIL 10 MG: 10 TABLET ORAL at 20:48

## 2022-07-29 RX ADMIN — MYCOPHENOLATE MOFETIL 1000 MG: 250 CAPSULE ORAL at 23:30

## 2022-07-29 RX ADMIN — HEPARIN SODIUM 3800 UNITS: 1000 INJECTION INTRAVENOUS; SUBCUTANEOUS at 12:44

## 2022-07-29 RX ADMIN — MYCOPHENOLATE MOFETIL 1000 MG: 250 CAPSULE ORAL at 14:32

## 2022-07-30 ENCOUNTER — READMISSION MANAGEMENT (OUTPATIENT)
Dept: CALL CENTER | Facility: HOSPITAL | Age: 32
End: 2022-07-30

## 2022-07-30 VITALS
RESPIRATION RATE: 18 BRPM | WEIGHT: 154.3 LBS | BODY MASS INDEX: 24.8 KG/M2 | SYSTOLIC BLOOD PRESSURE: 134 MMHG | OXYGEN SATURATION: 98 % | DIASTOLIC BLOOD PRESSURE: 82 MMHG | HEIGHT: 66 IN | TEMPERATURE: 98.5 F | HEART RATE: 73 BPM

## 2022-07-30 PROBLEM — M32.9 LUPUS (SYSTEMIC LUPUS ERYTHEMATOSUS): Status: ACTIVE | Noted: 2022-07-30

## 2022-07-30 PROBLEM — M32.14 LUPUS NEPHRITIS, ISN/RPS CLASS IV (HCC): Status: ACTIVE | Noted: 2022-07-30

## 2022-07-30 LAB
ACANTHOCYTES BLD QL SMEAR: ABNORMAL
ALBUMIN SERPL-MCNC: 2.8 G/DL (ref 3.5–5.2)
ALBUMIN/GLOB SERPL: 1 G/DL
ALP SERPL-CCNC: 47 U/L (ref 39–117)
ALT SERPL W P-5'-P-CCNC: 7 U/L (ref 1–33)
ANION GAP SERPL CALCULATED.3IONS-SCNC: 12.1 MMOL/L (ref 5–15)
ANISOCYTOSIS BLD QL: ABNORMAL
AST SERPL-CCNC: 17 U/L (ref 1–32)
BH BB BLOOD EXPIRATION DATE: NORMAL
BH BB BLOOD TYPE BARCODE: 5100
BH BB DISPENSE STATUS: NORMAL
BH BB PRODUCT CODE: NORMAL
BH BB UNIT NUMBER: NORMAL
BILIRUB SERPL-MCNC: 0.5 MG/DL (ref 0–1.2)
BUN SERPL-MCNC: 26 MG/DL (ref 6–20)
BUN/CREAT SERPL: 5.6 (ref 7–25)
CALCIUM SPEC-SCNC: 8 MG/DL (ref 8.6–10.5)
CHLORIDE SERPL-SCNC: 100 MMOL/L (ref 98–107)
CO2 SERPL-SCNC: 24.9 MMOL/L (ref 22–29)
CREAT SERPL-MCNC: 4.63 MG/DL (ref 0.57–1)
CROSSMATCH INTERPRETATION: NORMAL
DEPRECATED RDW RBC AUTO: 74 FL (ref 37–54)
EGFRCR SERPLBLD CKD-EPI 2021: 12.4 ML/MIN/1.73
ELLIPTOCYTES BLD QL SMEAR: ABNORMAL
ERYTHROCYTE [DISTWIDTH] IN BLOOD BY AUTOMATED COUNT: 26.7 % (ref 12.3–15.4)
GLOBULIN UR ELPH-MCNC: 2.9 GM/DL
GLUCOSE SERPL-MCNC: 88 MG/DL (ref 65–99)
HAPTOGLOB SERPL-MCNC: 88 MG/DL (ref 30–200)
HCT VFR BLD AUTO: 27.7 % (ref 34–46.6)
HGB BLD-MCNC: 8.7 G/DL (ref 12–15.9)
LYMPHOCYTES # BLD MANUAL: 0.93 10*3/MM3 (ref 0.7–3.1)
LYMPHOCYTES NFR BLD MANUAL: 4 % (ref 5–12)
MCH RBC QN AUTO: 24.9 PG (ref 26.6–33)
MCHC RBC AUTO-ENTMCNC: 31.4 G/DL (ref 31.5–35.7)
MCV RBC AUTO: 79.1 FL (ref 79–97)
MICROCYTES BLD QL: ABNORMAL
MONOCYTES # BLD: 0.47 10*3/MM3 (ref 0.1–0.9)
NEUTROPHILS # BLD AUTO: 10.23 10*3/MM3 (ref 1.7–7)
NEUTROPHILS NFR BLD MANUAL: 88 % (ref 42.7–76)
PLAT MORPH BLD: NORMAL
PLATELET # BLD AUTO: 109 10*3/MM3 (ref 140–450)
POIKILOCYTOSIS BLD QL SMEAR: ABNORMAL
POLYCHROMASIA BLD QL SMEAR: ABNORMAL
POTASSIUM SERPL-SCNC: 4.3 MMOL/L (ref 3.5–5.2)
PROT SERPL-MCNC: 5.7 G/DL (ref 6–8.5)
RBC # BLD AUTO: 3.5 10*6/MM3 (ref 3.77–5.28)
RETICS # AUTO: 0.1 10*6/MM3 (ref 0.02–0.13)
RETICS/RBC NFR AUTO: 2.88 % (ref 0.7–1.9)
SODIUM SERPL-SCNC: 137 MMOL/L (ref 136–145)
UNIT  ABO: NORMAL
UNIT  RH: NORMAL
VARIANT LYMPHS NFR BLD MANUAL: 8 % (ref 19.6–45.3)
WBC MORPH BLD: NORMAL
WBC NRBC COR # BLD: 11.63 10*3/MM3 (ref 3.4–10.8)

## 2022-07-30 PROCEDURE — 80053 COMPREHEN METABOLIC PANEL: CPT | Performed by: STUDENT IN AN ORGANIZED HEALTH CARE EDUCATION/TRAINING PROGRAM

## 2022-07-30 PROCEDURE — 85007 BL SMEAR W/DIFF WBC COUNT: CPT | Performed by: STUDENT IN AN ORGANIZED HEALTH CARE EDUCATION/TRAINING PROGRAM

## 2022-07-30 PROCEDURE — 63710000001 PREDNISONE PER 5 MG: Performed by: INTERNAL MEDICINE

## 2022-07-30 PROCEDURE — 85025 COMPLETE CBC W/AUTO DIFF WBC: CPT | Performed by: STUDENT IN AN ORGANIZED HEALTH CARE EDUCATION/TRAINING PROGRAM

## 2022-07-30 PROCEDURE — 63710000001 MYCOPHENOLATE MOFETIL PER 250 MG: Performed by: INTERNAL MEDICINE

## 2022-07-30 PROCEDURE — 83010 ASSAY OF HAPTOGLOBIN QUANT: CPT | Performed by: STUDENT IN AN ORGANIZED HEALTH CARE EDUCATION/TRAINING PROGRAM

## 2022-07-30 PROCEDURE — 85045 AUTOMATED RETICULOCYTE COUNT: CPT | Performed by: INTERNAL MEDICINE

## 2022-07-30 RX ORDER — AMLODIPINE BESYLATE 5 MG/1
5 TABLET ORAL
Status: DISCONTINUED | OUTPATIENT
Start: 2022-07-31 | End: 2022-07-30 | Stop reason: HOSPADM

## 2022-07-30 RX ORDER — MYCOPHENOLATE MOFETIL 250 MG/1
1000 CAPSULE ORAL EVERY 12 HOURS SCHEDULED
Qty: 240 CAPSULE | Refills: 0 | Status: SHIPPED | OUTPATIENT
Start: 2022-07-30 | End: 2022-08-29

## 2022-07-30 RX ORDER — CLONIDINE 0.3 MG/24H
1 PATCH, EXTENDED RELEASE TRANSDERMAL WEEKLY
Qty: 4 PATCH | Refills: 0 | Status: SHIPPED | OUTPATIENT
Start: 2022-08-06 | End: 2022-09-05

## 2022-07-30 RX ORDER — MINOXIDIL 10 MG/1
10 TABLET ORAL EVERY 12 HOURS SCHEDULED
Qty: 60 TABLET | Refills: 0 | Status: SHIPPED | OUTPATIENT
Start: 2022-07-30 | End: 2022-10-17

## 2022-07-30 RX ORDER — SEVELAMER CARBONATE 800 MG/1
800 TABLET, FILM COATED ORAL
Qty: 90 TABLET | Refills: 0 | Status: SHIPPED | OUTPATIENT
Start: 2022-07-30 | End: 2022-08-29

## 2022-07-30 RX ORDER — ACETAMINOPHEN 325 MG/1
650 TABLET ORAL EVERY 4 HOURS PRN
Start: 2022-07-30 | End: 2022-08-03

## 2022-07-30 RX ORDER — AMLODIPINE BESYLATE 10 MG/1
5 TABLET ORAL DAILY
Qty: 30 TABLET | Refills: 1
Start: 2022-07-30 | End: 2022-09-13

## 2022-07-30 RX ORDER — CARVEDILOL 3.12 MG/1
3.12 TABLET ORAL 2 TIMES DAILY WITH MEALS
Qty: 60 TABLET | Refills: 0 | Status: SHIPPED | OUTPATIENT
Start: 2022-07-30 | End: 2022-11-14 | Stop reason: SDUPTHER

## 2022-07-30 RX ORDER — PANTOPRAZOLE SODIUM 40 MG/1
40 TABLET, DELAYED RELEASE ORAL
Qty: 30 TABLET | Refills: 0 | Status: SHIPPED | OUTPATIENT
Start: 2022-07-31 | End: 2022-08-30

## 2022-07-30 RX ORDER — PREDNISONE 20 MG/1
60 TABLET ORAL
Qty: 90 TABLET | Refills: 0 | Status: SHIPPED | OUTPATIENT
Start: 2022-07-31 | End: 2022-08-30

## 2022-07-30 RX ADMIN — SEVELAMER CARBONATE 800 MG: 800 TABLET, FILM COATED ORAL at 12:24

## 2022-07-30 RX ADMIN — CLONIDINE 1 PATCH: 0.3 PATCH TRANSDERMAL at 08:59

## 2022-07-30 RX ADMIN — PREDNISONE 60 MG: 50 TABLET ORAL at 08:57

## 2022-07-30 RX ADMIN — PANTOPRAZOLE SODIUM 40 MG: 40 TABLET, DELAYED RELEASE ORAL at 05:04

## 2022-07-30 RX ADMIN — MINOXIDIL 10 MG: 10 TABLET ORAL at 08:57

## 2022-07-30 RX ADMIN — SEVELAMER CARBONATE 800 MG: 800 TABLET, FILM COATED ORAL at 08:57

## 2022-07-30 RX ADMIN — AMLODIPINE BESYLATE 10 MG: 10 TABLET ORAL at 08:57

## 2022-07-30 RX ADMIN — MYCOPHENOLATE MOFETIL 1000 MG: 250 CAPSULE ORAL at 08:56

## 2022-07-30 RX ADMIN — GUAIFENESIN 600 MG: 600 TABLET, EXTENDED RELEASE ORAL at 08:57

## 2022-07-30 RX ADMIN — CARVEDILOL 3.12 MG: 3.12 TABLET, FILM COATED ORAL at 08:57

## 2022-07-30 NOTE — OUTREACH NOTE
Prep Survey    Flowsheet Row Responses   [facility] facility patient discharged from? [place]   Is LACE score < 7 ? No   Emergency Room discharge w/ pulse ox? No   Eligibility [facility]   Date of Admission 07/20/22   Date of Discharge 07/30/22   Discharge Disposition Home or Self Care   Discharge diagnosis Lupus-Tunneled dialysis catheter placement   Does the patient have one of the following disease processes/diagnoses(primary or secondary)? Other   Does the patient have Home health ordered? No   Is there a DME ordered? No   Comments regarding appointments outpt dialysis at [facility]   Prep survey completed? Yes          YRIS CHRISTIANSEN - Registered Nurse

## 2022-08-01 ENCOUNTER — TRANSITIONAL CARE MANAGEMENT TELEPHONE ENCOUNTER (OUTPATIENT)
Dept: CALL CENTER | Facility: HOSPITAL | Age: 32
End: 2022-08-01

## 2022-08-01 NOTE — OUTREACH NOTE
Call Center TCM Note    Flowsheet Row Responses   [facility] patient discharged from? [place]   Does the patient have one of the following disease processes/diagnoses(primary or secondary)? Other   TCM attempt successful? Yes   Call start time 1210   Call end time 1213   Discharge diagnosis Lupus-Tunneled dialysis catheter placement   Person spoke with today (if not patient) and relationship patient   Meds reviewed with patient/caregiver? Yes   Is the patient having any side effects they believe may be caused by any medication additions or changes? No   Does the patient have all medications ordered at discharge? Yes   Is the patient taking all medications as directed (includes completed medication regime)? Yes   Comments regarding appointments outpt dialysis at [facility]  [Reviewed dialysis clinic appt on Tues Thirs at 1 pm. ]   Does the patient have a primary care provider?  Yes   Does the patient have an appointment with their PCP within 7 days of discharge? Yes   Comments regarding PCP Scheduled a hospital followup with Dr Corey on 8/3 at 230 pm.    Has the patient kept scheduled appointments due by today? N/A   Psychosocial issues? No   Did the patient receive a copy of their discharge instructions? Yes   Nursing interventions Reviewed instructions with patient   What is the patient's perception of their health status since discharge? Improving   Is the patient/caregiver able to teach back signs and symptoms related to disease process for when to call PCP? Yes   Is the patient/caregiver able to teach back signs and symptoms related to disease process for when to call 911? Yes   Is the patient/caregiver able to teach back the hierarchy of who to call/visit for symptoms/problems? PCP, Specialist, Home health nurse, Urgent Care, ED, 911 Yes   If the patient is a current smoker, are they able to teach back resources for cessation? Not a smoker   TCM call completed? Yes   Wrap up additional comments Patient  is doing ok. Will start diaylsis tomorrow. No questions or concerns.           Zohaib Ren RN    8/1/2022, 12:14 EDT

## 2022-08-01 NOTE — CASE MANAGEMENT/SOCIAL WORK
Case Management Discharge Note      Final Note: Pt discharged home with outpatient dialysis arranged at [facility].   ANNABELLE Baldwin RN         Selected Continued Care - Discharged on 7/30/2022 Admission date: 7/20/2022 - Discharge disposition: Home or Self Care    Destination    No services have been selected for the patient.              Durable Medical Equipment    No services have been selected for the patient.              Dialysis/Infusion    No services have been selected for the patient.              Home Medical Care    No services have been selected for the patient.              Therapy    No services have been selected for the patient.              Community Resources    No services have been selected for the patient.              Community & DME    No services have been selected for the patient.                  Transportation Services  Private: Car    Final Discharge Disposition Code: 01 - home or self-care

## 2022-08-03 ENCOUNTER — OFFICE VISIT (OUTPATIENT)
Dept: FAMILY MEDICINE CLINIC | Facility: CLINIC | Age: 32
End: 2022-08-03

## 2022-08-03 VITALS
TEMPERATURE: 98.7 F | WEIGHT: 155.8 LBS | BODY MASS INDEX: 25.04 KG/M2 | DIASTOLIC BLOOD PRESSURE: 110 MMHG | OXYGEN SATURATION: 97 % | HEIGHT: 66 IN | SYSTOLIC BLOOD PRESSURE: 158 MMHG | HEART RATE: 65 BPM

## 2022-08-03 DIAGNOSIS — M31.19 T.T.P. SYNDROME: Primary | ICD-10-CM

## 2022-08-03 DIAGNOSIS — N17.0 ACUTE RENAL FAILURE WITH TUBULAR NECROSIS: ICD-10-CM

## 2022-08-03 DIAGNOSIS — M79.89 SWELLING OF BOTH LOWER EXTREMITIES: ICD-10-CM

## 2022-08-03 DIAGNOSIS — D64.9 ANEMIA, UNSPECIFIED TYPE: ICD-10-CM

## 2022-08-03 DIAGNOSIS — M32.14 LUPUS NEPHRITIS: ICD-10-CM

## 2022-08-03 PROCEDURE — 99496 TRANSJ CARE MGMT HIGH F2F 7D: CPT | Performed by: INTERNAL MEDICINE

## 2022-08-03 NOTE — PROGRESS NOTES
"Chief Complaint  f/u hosp fatique,kidney failure and all swelling eye/face since left hospital    Subjective        Dee Herrera presents to [facility]  History of Present Illness Current outpatient and discharge medications have been reconciled for the patient.  Reviewed by: Bobby Corey MD hospital admissions 7/28/2022 discharge 7/30/2022  Patient was seen for hospital follow-up for TTP, acute renal failure with tubular necrosis, anemia, lupus nephritis.  Patient was seen and placed on immunosuppressive agents with high-dose steroids.  Patient also had uncontrolled hypertension with systolic blood pressures in the 180s to 170s with diastolics in the 120s problem patient was placed on amlodipine 10 mg daily, carvedilol 3.125 mg twice daily, clonidine TTS patch 0.3 mg 1 patch as directed per week, minoxidil 10 mg daily twice daily.  Patient was also noticed some increased swelling of her left leg.  Patient was advised to get a venous Doppler today but says she cannot do it and will get it tomorrow morning.  Patient was informed it could be a blood clot in [place] called pulmonary embolism.  Patient states she can only do it tomorrow.  Patient also has severe anemia was given ferrous sulfate with folic acid in the hospital.  She also received multiple blood transfusions.  Patient also suffering from acute renal failure with tubular necrosis is getting hemodialysis through report in her chest.  Patient monitoring blood pressure, pulse, weights and call results in 1 week.  Patient's systolic blood pressure today was 150 with a diastolic of 110.    Dictated utilizing Dragon dictation. If there are questions or for further clarification, please contact me.    Objective   Vital Signs:  Blood Pressure (Abnormal) 158/110   Pulse 65   Temperature 98.7 °F (37.1 °C)   Height 167.6 cm (66\")   Weight 70.7 kg (155 lb 12.8 oz)   Oxygen Saturation 97%   Body Mass Index 25.15 kg/m² " "  Estimated body mass index is 25.15 kg/m² as calculated from the following:    Height as of this encounter: 167.6 cm (66\").    Weight as of this encounter: 70.7 kg (155 lb 12.8 oz).          Physical Exam  Vitals and nursing note reviewed.   Constitutional:       Appearance: Normal appearance. She is well-developed.   HENT:      Head: Normocephalic and atraumatic.      Comments: Facial swelling     Nose: Nose normal.      Mouth/Throat:      Mouth: Mucous membranes are moist.      Pharynx: Oropharynx is clear.   Eyes:      Extraocular Movements: Extraocular movements intact.      Conjunctiva/sclera: Conjunctivae normal.      Pupils: Pupils are equal, round, and reactive to light.   Cardiovascular:      Rate and Rhythm: Normal rate and regular rhythm.      Heart sounds: Normal heart sounds. No murmur heard.    No friction rub. No gallop.   Pulmonary:      Effort: Pulmonary effort is normal. No respiratory distress.      Breath sounds: Normal breath sounds. No stridor. No wheezing, rhonchi or rales.   Chest:      Chest wall: No tenderness.   Abdominal:      General: Bowel sounds are normal.      Palpations: Abdomen is soft.   Musculoskeletal:         General: Normal range of motion.      Cervical back: Normal range of motion and neck supple.      Right lower leg: Edema present.      Left lower leg: Edema present.   Skin:     General: Skin is warm and dry.   Neurological:      General: No focal deficit present.      Mental Status: She is alert and oriented to person, place, and time. Mental status is at baseline.   Psychiatric:         Mood and Affect: Mood normal.         Behavior: Behavior normal.         Thought Content: Thought content normal.         Judgment: Judgment normal.        Result Review :                Assessment and Plan  #1 venous Doppler soon as possible #2 monitor blood pressure/pulse/weight, call results in 1 week #3 continue present medication treatment #4 follow-up with specialists  Diagnoses " and all orders for this visit:    1. T.T.P. syndrome (HCC) (Primary)    2. Acute renal failure with tubular necrosis (HCC)    3. Anemia, unspecified type    4. Swelling of both lower extremities  -     Duplex Venous Lower Extremity - Left CAR; Future    5. Lupus nephritis (HCC)             Follow Up   Return in about 1 week (around 8/10/2022), or if symptoms worsen or fail to improve, for Recheck.  Patient was given instructions and counseling regarding her condition or for health maintenance advice. Please see specific information pulled into the AVS if appropriate.

## 2022-08-04 ENCOUNTER — HOSPITAL ENCOUNTER (OUTPATIENT)
Dept: CARDIOLOGY | Facility: HOSPITAL | Age: 32
Discharge: HOME | End: 2022-08-04
Admitting: INTERNAL MEDICINE

## 2022-08-04 ENCOUNTER — TELEPHONE (OUTPATIENT)
Dept: FAMILY MEDICINE CLINIC | Facility: CLINIC | Age: 32
End: 2022-08-04

## 2022-08-04 DIAGNOSIS — M79.89 SWELLING OF BOTH LOWER EXTREMITIES: ICD-10-CM

## 2022-08-04 LAB
BH CV LOW VAS LEFT LESSER SAPH VESSEL: 1
BH CV LOWER VASCULAR LEFT COMMON FEMORAL AUGMENT: NORMAL
BH CV LOWER VASCULAR LEFT COMMON FEMORAL COMPETENT: NORMAL
BH CV LOWER VASCULAR LEFT COMMON FEMORAL COMPRESS: NORMAL
BH CV LOWER VASCULAR LEFT COMMON FEMORAL PHASIC: NORMAL
BH CV LOWER VASCULAR LEFT COMMON FEMORAL SPONT: NORMAL
BH CV LOWER VASCULAR LEFT DISTAL FEMORAL COMPRESS: NORMAL
BH CV LOWER VASCULAR LEFT GASTRONEMIUS COMPRESS: NORMAL
BH CV LOWER VASCULAR LEFT GREATER SAPH AK COMPRESS: NORMAL
BH CV LOWER VASCULAR LEFT GREATER SAPH BK COMPRESS: NORMAL
BH CV LOWER VASCULAR LEFT LESSER SAPH COMPRESS: NORMAL
BH CV LOWER VASCULAR LEFT LESSER SAPH THROMBUS: NORMAL
BH CV LOWER VASCULAR LEFT MID FEMORAL AUGMENT: NORMAL
BH CV LOWER VASCULAR LEFT MID FEMORAL COMPETENT: NORMAL
BH CV LOWER VASCULAR LEFT MID FEMORAL COMPRESS: NORMAL
BH CV LOWER VASCULAR LEFT MID FEMORAL PHASIC: NORMAL
BH CV LOWER VASCULAR LEFT MID FEMORAL SPONT: NORMAL
BH CV LOWER VASCULAR LEFT PERONEAL COMPRESS: NORMAL
BH CV LOWER VASCULAR LEFT POPLITEAL AUGMENT: NORMAL
BH CV LOWER VASCULAR LEFT POPLITEAL COMPETENT: NORMAL
BH CV LOWER VASCULAR LEFT POPLITEAL COMPRESS: NORMAL
BH CV LOWER VASCULAR LEFT POPLITEAL PHASIC: NORMAL
BH CV LOWER VASCULAR LEFT POPLITEAL SPONT: NORMAL
BH CV LOWER VASCULAR LEFT POSTERIOR TIBIAL COMPRESS: NORMAL
BH CV LOWER VASCULAR LEFT PROFUNDA FEMORAL COMPRESS: NORMAL
BH CV LOWER VASCULAR LEFT PROXIMAL FEMORAL COMPRESS: NORMAL
BH CV LOWER VASCULAR LEFT SAPHENOFEMORAL JUNCTION COMPRESS: NORMAL
BH CV LOWER VASCULAR RIGHT COMMON FEMORAL AUGMENT: NORMAL
BH CV LOWER VASCULAR RIGHT COMMON FEMORAL COMPETENT: NORMAL
BH CV LOWER VASCULAR RIGHT COMMON FEMORAL COMPRESS: NORMAL
BH CV LOWER VASCULAR RIGHT COMMON FEMORAL PHASIC: NORMAL
BH CV LOWER VASCULAR RIGHT COMMON FEMORAL SPONT: NORMAL
MAXIMAL PREDICTED HEART RATE: 190 BPM
STRESS TARGET HR: 162 BPM

## 2022-08-04 PROCEDURE — 93971 EXTREMITY STUDY: CPT

## 2022-08-04 NOTE — TELEPHONE ENCOUNTER
Caller: Dee Herrera    Relationship: Self    Best call back number: [phone]    What medication are you requesting:     What are your current symptoms: SWELLING AND ITCHING ALL OVER HER BODY    How long have you been experiencing symptoms: A FEW DAYS    Have you had these symptoms before:    [] Yes  [x] No    Have you been treated for these symptoms before:   [] Yes  [x] No    If a prescription is needed, what is your preferred pharmacy and phone number:  [facility]  [address]  [phone]    Additional notes:  PATIENT IS CALLING IN TO ASK IF DR BOLANOS CAN CALL HER IN A MEDICATION AS SHE IS HAVING SWELLING AND ITCHING ALL OVER HER BODY.  SHE SAYS THIS STARTED A FEW DAYS AGO.

## 2022-08-05 ENCOUNTER — DOCUMENTATION (OUTPATIENT)
Dept: FAMILY MEDICINE CLINIC | Facility: CLINIC | Age: 32
End: 2022-08-05

## 2022-08-05 RX ORDER — HYDROXYZINE HYDROCHLORIDE 25 MG/1
25 TABLET, FILM COATED ORAL EVERY 8 HOURS PRN
Qty: 30 TABLET | Refills: 3 | Status: SHIPPED | OUTPATIENT
Start: 2022-08-05 | End: 2022-10-17

## 2022-08-05 NOTE — TELEPHONE ENCOUNTER
PATIENTS MOTHER IS CALLING IN SHE IS ASKING ABOUT GETTING SOMETHING PRESCRIBED FOR PATIENTS ITCHING. THE BENADRYL IS NOT WORKING AND SHE IS BREAKING OUT IN HIVES AND FACE IS STILL VERY SWOLLEN.    THEY ARE ALSO ASKING ABOUT GETTING SOMETHING FOR THE NAUSEA THAT SHE IS HAVING AS WELL.    PLEASE ADVISE    CALLBACK NUMBER IS  [phone]      [facility]  [id number] - [place], [place] - [address] AT [address] [place] [address] - [phone] [place] [phone] FX

## 2022-08-05 NOTE — PROGRESS NOTES
8/5/2022 12:33 PM    Patient was called and she is developing severe hives with itching.  Patient was seen in the office yesterday and had facial swelling with severe itching.  Patient had extensive problem with TTP nephritis lupus and severe autoimmune diseases.  Patient was admitted to the hospital and placed on the medication.  Patient was advised to go to the emergency room if not better but wanted to try hydroxyzine 25 mg p.o. every 8 as needed.  Patient was advised to stop the Benadryl.  Patient was informed that I have seen patient's allergic to prednisone but she cannot cold turkey the medication.  Patient would have to wean off and call her doctor that prescribed it to see what he would want to do.

## 2022-08-05 NOTE — TELEPHONE ENCOUNTER
Caller: Dee Herrera    Relationship to patient: Self    Best call back number: [phone]       Chief complaint: PATIENT STATES SHE IS BROKE OUT IN HIVES AND IS HAVING ITCHING TO THE POINT WHERE IT IS CAUSING SORES. PATIENT ALSO STATES HER FACE IS SWOLLEN.     Patient directed to call 911 or go to their nearest emergency room.     Patient verbalized understanding: [x] Yes  [] No  If no, why?    Additional notes: PATIENT WAS ADVISED TO GO THE EMERGENCY ROOM, PATIENT STATED THAT SHE WOULD GO TO [facility]. PATIENT ALSO WOULD STILL LIKE A CALL FROM DR BOLANOS.

## 2022-08-08 ENCOUNTER — TELEPHONE (OUTPATIENT)
Dept: FAMILY MEDICINE CLINIC | Facility: CLINIC | Age: 32
End: 2022-08-08

## 2022-08-08 NOTE — TELEPHONE ENCOUNTER
Caller: Dee Herrera    Relationship: Self    Best call back number: [phone]    What is the best time to reach you: ANY    Who are you requesting to speak with (clinical staff, provider,  specific staff member): CLINICAL    Do you know the name of the person who called:PATIENT    What was the call regardin/04  208/136 FEW HOURS AFTER TAKING MEDS 105/80      203/132 AFTER MEDS 120/90    268/190 AFTER MEDS  130/92      Do you require a callback: IF DR BOLANOS WOULD LIKE

## 2022-08-08 NOTE — TELEPHONE ENCOUNTER
Spoke with pt, her bp that is over 200 is before meds- after taking meds goes down to normal  Advised pt ER if it gets that high again and will check with Dr. Corey tomorrow on his return

## 2022-08-09 ENCOUNTER — READMISSION MANAGEMENT (OUTPATIENT)
Dept: CALL CENTER | Facility: HOSPITAL | Age: 32
End: 2022-08-09

## 2022-08-09 ENCOUNTER — NURSE TRIAGE (OUTPATIENT)
Dept: CALL CENTER | Facility: HOSPITAL | Age: 32
End: 2022-08-09

## 2022-08-09 NOTE — TELEPHONE ENCOUNTER
" Dr. Diandra Adhikari   [facility]  [address], [place] [address] [phone] phone    Reason for Disposition  • Information only question and nurse able to answer    Additional Information  • Negative: Nursing judgment  • Negative: Nursing judgment  • Negative: Nursing judgment  • Negative: Nursing judgment    Answer Assessment - Initial Assessment Questions  1. REASON FOR CALL or QUESTION: \"What is your reason for calling today?\" or \"How can I best help you?\" or \"What question do you have that I can help answer?\"     Dr. Diandra Adhikari   [facility]  [address] [place] [address]  [phone] phone    Protocols used: INFORMATION ONLY CALL - NO TRIAGE-ADULT-[place]      "

## 2022-08-09 NOTE — OUTREACH NOTE
Medical Week 2 Survey    Flowsheet Row Responses   [facility] patient discharged from? [place]   Does the patient have one of the following disease processes/diagnoses(primary or secondary)? Other   Week 2 attempt successful? Yes   Call start time 1530   Discharge diagnosis Lupus-Tunneled dialysis catheter placement   Call end time 1534   Meds reviewed with patient/caregiver? Yes   Is the patient having any side effects they believe may be caused by any medication additions or changes? No   Does the patient have all medications ordered at discharge? Yes   Is the patient taking all medications as directed (includes completed medication regime)? Yes   Does the patient have a primary care provider?  Yes   Does the patient have an appointment with their PCP within 7 days of discharge? Yes   Has the patient kept scheduled appointments due by today? Yes   Has home health visited the patient within 72 hours of discharge? N/A   Comments Encouraged clinical dietician and speech therapy for swallowing issues.   Did the patient receive a copy of their discharge instructions? Yes   Nursing interventions Reviewed instructions with patient   What is the patient's perception of their health status since discharge? Same   Is the patient/caregiver able to teach back signs and symptoms related to disease process for when to call PCP? Yes   Is the patient/caregiver able to teach back signs and symptoms related to disease process for when to call 911? Yes   Is the patient/caregiver able to teach back the hierarchy of who to call/visit for symptoms/problems? PCP, Specialist, Home health nurse, Urgent Care, ED, 911 Yes   If the patient is a current smoker, are they able to teach back resources for cessation? Not a smoker   Additional teach back comments At dialysis at time of call, says swallowing is difficult.   Week 2 Call Completed? Yes   Wrap up additional comments She is ok, but not well she says.          AJITH Mckinnon  Registered Nurse

## 2022-08-16 ENCOUNTER — READMISSION MANAGEMENT (OUTPATIENT)
Dept: CALL CENTER | Facility: HOSPITAL | Age: 32
End: 2022-08-16

## 2022-08-16 NOTE — OUTREACH NOTE
Medical Week 3 Survey    Flowsheet Row Responses   [facility] patient discharged from? [place]   Does the patient have one of the following disease processes/diagnoses(primary or secondary)? Other   Week 3 attempt successful? No   Unsuccessful attempts Attempt 1  [attempted pt and grandmother]   Discharge diagnosis Lupus-Tunneled dialysis catheter placement          DANYELL MEJIA - Registered Nurse

## 2022-08-18 ENCOUNTER — READMISSION MANAGEMENT (OUTPATIENT)
Dept: CALL CENTER | Facility: HOSPITAL | Age: 32
End: 2022-08-18

## 2022-08-18 ENCOUNTER — TELEPHONE (OUTPATIENT)
Dept: FAMILY MEDICINE CLINIC | Facility: CLINIC | Age: 32
End: 2022-08-18

## 2022-08-18 NOTE — OUTREACH NOTE
Medical Week 3 Survey    Flowsheet Row Responses   [facility] [place] patient discharged from? [place]   Does the patient have one of the following disease processes/diagnoses(primary or secondary)? Other   Week 3 attempt successful? Yes   Call start time 1536   Revoke Readmitted  [admitted at [facility] since 8/15/22]   Call end time 1537   Discharge diagnosis Lupus-Tunneled dialysis catheter placement          AWILDA SALAZAR - Registered Nurse

## 2022-08-18 NOTE — TELEPHONE ENCOUNTER
Caller: PAULINE TRINIDAD    Relationship: Emergency Contact    Best call back number: [phone] (M)    PATIENT WAS ADMITTED TO THE [facility], BACK IN June, FOR 3 WEEKS.     GRANDMOTHER CALLED, PAULINE TRINIDAD, REQUESTING WE FAX CLINICAL NOTES ABOUT THIS STAY TO HER JOB, TO SHOW THE EMPLOYER WHAT PATIENT WAS IN THE HOSPITAL FOR AND HOW LONG SHE WAS IN.     THEY REQUEST THIS GETS SENT TO :  [email or web address]    PLEASE ADVISE

## 2022-08-22 NOTE — TELEPHONE ENCOUNTER
Informed patient grandmother not on consent and needs to sign a release to get a discharge or hospital stay and brandie visit

## 2022-08-24 ENCOUNTER — TELEPHONE (OUTPATIENT)
Dept: FAMILY MEDICINE CLINIC | Facility: CLINIC | Age: 32
End: 2022-08-24

## 2022-08-24 DIAGNOSIS — B49 FUNGUS DISEASE: Primary | ICD-10-CM

## 2022-08-24 NOTE — TELEPHONE ENCOUNTER
Caller: VIVIPAULINE    Relationship: Emergency Contact    Best call back number: [phone]    What is the best time to reach you: ANY TIME    Who are you requesting to speak with (clinical staff, provider,  specific staff member): CLINICAL    What was the call regarding: PATIENT'S GRANDMOTHER IS CALLING ON BEHALF OF PATIENT TO ASK DR. BOLANOS IF THERE IS A PRODUCT TO HELP THE PATIENT SLEEP THAT SHE CAN TAKE, SUCH AS TYLENOL.  PLEASE ADVISE.      PAULINE IS ALSO STATING THAT THE PATIENT HAS YEAST IN HER MOUTH, AND WOULD LIKE TO KNOW WHAT DR. BOLANOS RECOMMENDS SHE TAKE FOR THAT. GRANDMOTHER SAYS THAT THE PATIENT'S INSURANCE DOES NOT REALLY COVER THE PATIENT'S MEDICATIONS, THEREFORE, SHE IS ASKING IF A PRESCRIPTION IS CALLED IN, IF IT CAN BE DONE IN 2 SMALL QUANTITIES BECAUSE THE PATIENT DOES NOT HAVE THE MONEY TO PAY FOR A 30 DAY SUPPLY.    PLEASE ADVISE.      Do you require a callback: YES

## 2022-08-24 NOTE — TELEPHONE ENCOUNTER
Patient can take over-the-counter sleep aids.  Patient will need a KOH of the tongue and treatment is for 2 weeks he can either get a pill once a day or a swish and swallow 4 times a day at the end of 2 weeks she has to be retested.

## 2022-08-26 ENCOUNTER — DOCUMENTATION (OUTPATIENT)
Dept: NEPHROLOGY | Facility: HOSPITAL | Age: 32
End: 2022-08-26

## 2022-08-30 ENCOUNTER — TELEPHONE (OUTPATIENT)
Dept: FAMILY MEDICINE CLINIC | Facility: CLINIC | Age: 32
End: 2022-08-30

## 2022-08-30 NOTE — TELEPHONE ENCOUNTER
UNABLE TO WARM TRANSFER      Caller: PATT    Relationship to patient: [facility]    Best call back number: [phone]    Patient is needing: PATIENT IS DISCHARGING TODAY OR TOMORROW. COVID POS. THEY ARE NEEDING A HOSPITAL FOLLOW UP. PLEASE CALL TO SCHEDULE.

## 2022-09-08 ENCOUNTER — TELEPHONE (OUTPATIENT)
Dept: FAMILY MEDICINE CLINIC | Facility: CLINIC | Age: 32
End: 2022-09-08

## 2022-09-08 NOTE — TELEPHONE ENCOUNTER
UNABLE TO WARM TRANSFER    Caller: Dee Herrera    Relationship to patient: Self    Best call back number: [phone]    Patient is needing: PATIENT HAS HIGH BLOOD PRESSURE CONCERNS AND IS NEEDING TO SPEAK TO A NURSE. SHE STATES THAT HER GRANDMOTHER CALLED OVER A WEEK AGO AND NO ONE CALLED BACK. PLEASE CALL AND ADVISE.

## 2022-09-12 ENCOUNTER — TELEPHONE (OUTPATIENT)
Dept: ONCOLOGY | Facility: CLINIC | Age: 32
End: 2022-09-12

## 2022-09-12 NOTE — TELEPHONE ENCOUNTER
----- Message from Norberto Almaraz MD PhD sent at 9/12/2022  8:32 AM EDT -----  Jaja and Carmen,    Please arrange patient see me in 2 to 3 weeks, 2 units, check CBC and CMP, IPF.  She no showed her recently in middle of August.     Thank you very much!    Sung

## 2022-09-13 ENCOUNTER — OFFICE VISIT (OUTPATIENT)
Dept: FAMILY MEDICINE CLINIC | Facility: CLINIC | Age: 32
End: 2022-09-13

## 2022-09-13 VITALS
HEART RATE: 75 BPM | HEIGHT: 65 IN | RESPIRATION RATE: 16 BRPM | TEMPERATURE: 98 F | DIASTOLIC BLOOD PRESSURE: 110 MMHG | BODY MASS INDEX: 24.86 KG/M2 | OXYGEN SATURATION: 100 % | WEIGHT: 149.2 LBS | SYSTOLIC BLOOD PRESSURE: 146 MMHG

## 2022-09-13 DIAGNOSIS — I15.1 HYPERTENSION SECONDARY TO OTHER RENAL DISORDERS: ICD-10-CM

## 2022-09-13 DIAGNOSIS — G40.909 NONINTRACTABLE EPILEPSY WITHOUT STATUS EPILEPTICUS, UNSPECIFIED EPILEPSY TYPE: ICD-10-CM

## 2022-09-13 DIAGNOSIS — E83.51 HYPOCALCEMIA: ICD-10-CM

## 2022-09-13 DIAGNOSIS — M32.14 LUPUS NEPHRITIS, ISN/RPS CLASS IV: ICD-10-CM

## 2022-09-13 DIAGNOSIS — N28.89 HYPERTENSION SECONDARY TO OTHER RENAL DISORDERS: ICD-10-CM

## 2022-09-13 DIAGNOSIS — D50.9 IRON DEFICIENCY ANEMIA, UNSPECIFIED IRON DEFICIENCY ANEMIA TYPE: ICD-10-CM

## 2022-09-13 DIAGNOSIS — E87.6 HYPOKALEMIA: Primary | ICD-10-CM

## 2022-09-13 LAB
ALBUMIN SERPL-MCNC: 3.4 G/DL (ref 3.5–5.2)
ALBUMIN/GLOB SERPL: 1.1 G/DL
ALP SERPL-CCNC: 47 U/L (ref 39–117)
ALT SERPL W P-5'-P-CCNC: 6 U/L (ref 1–33)
ANION GAP SERPL CALCULATED.3IONS-SCNC: 6.7 MMOL/L (ref 5–15)
AST SERPL-CCNC: 17 U/L (ref 1–32)
BILIRUB SERPL-MCNC: 0.4 MG/DL (ref 0–1.2)
BUN SERPL-MCNC: 8 MG/DL (ref 6–20)
BUN/CREAT SERPL: 2 (ref 7–25)
CALCIUM SPEC-SCNC: 8.4 MG/DL (ref 8.6–10.5)
CHLORIDE SERPL-SCNC: 99 MMOL/L (ref 98–107)
CO2 SERPL-SCNC: 34.3 MMOL/L (ref 22–29)
CREAT SERPL-MCNC: 3.94 MG/DL (ref 0.57–1)
EGFRCR SERPLBLD CKD-EPI 2021: 15 ML/MIN/1.73
ERYTHROCYTE [DISTWIDTH] IN BLOOD BY AUTOMATED COUNT: 19.7 % (ref 12.3–15.4)
FERRITIN SERPL-MCNC: 56.5 NG/ML (ref 13–150)
FOLATE SERPL-MCNC: 6.14 NG/ML (ref 4.78–24.2)
GLOBULIN UR ELPH-MCNC: 3 GM/DL
GLUCOSE SERPL-MCNC: 77 MG/DL (ref 65–99)
HCT VFR BLD AUTO: 31.3 % (ref 34–46.6)
HGB BLD-MCNC: 9.8 G/DL (ref 12–15.9)
IRON 24H UR-MRATE: 19 MCG/DL (ref 37–145)
IRON SATN MFR SERPL: 6 % (ref 20–50)
LYMPHOCYTES # BLD AUTO: 1.9 10*3/MM3 (ref 0.7–3.1)
LYMPHOCYTES NFR BLD AUTO: 50.4 % (ref 19.6–45.3)
MCH RBC QN AUTO: 23.1 PG (ref 26.6–33)
MCHC RBC AUTO-ENTMCNC: 31.4 G/DL (ref 31.5–35.7)
MCV RBC AUTO: 73.5 FL (ref 79–97)
MONOCYTES # BLD AUTO: 0.4 10*3/MM3 (ref 0.1–0.9)
MONOCYTES NFR BLD AUTO: 10.6 % (ref 5–12)
NEUTROPHILS NFR BLD AUTO: 1.4 10*3/MM3 (ref 1.7–7)
NEUTROPHILS NFR BLD AUTO: 39 % (ref 42.7–76)
PLATELET # BLD AUTO: 151 10*3/MM3 (ref 140–450)
PMV BLD AUTO: 9.7 FL (ref 6–12)
POTASSIUM SERPL-SCNC: 3.3 MMOL/L (ref 3.5–5.2)
PROT SERPL-MCNC: 6.4 G/DL (ref 6–8.5)
RBC # BLD AUTO: 4.25 10*6/MM3 (ref 3.77–5.28)
SODIUM SERPL-SCNC: 140 MMOL/L (ref 136–145)
TIBC SERPL-MCNC: 341 MCG/DL (ref 298–536)
TRANSFERRIN SERPL-MCNC: 229 MG/DL (ref 200–360)
VIT B12 BLD-MCNC: 647 PG/ML (ref 211–946)
WBC NRBC COR # BLD: 3.7 10*3/MM3 (ref 3.4–10.8)

## 2022-09-13 PROCEDURE — 99214 OFFICE O/P EST MOD 30 MIN: CPT | Performed by: NURSE PRACTITIONER

## 2022-09-13 PROCEDURE — 84466 ASSAY OF TRANSFERRIN: CPT | Performed by: NURSE PRACTITIONER

## 2022-09-13 PROCEDURE — 82728 ASSAY OF FERRITIN: CPT | Performed by: NURSE PRACTITIONER

## 2022-09-13 PROCEDURE — 82746 ASSAY OF FOLIC ACID SERUM: CPT | Performed by: NURSE PRACTITIONER

## 2022-09-13 PROCEDURE — 85025 COMPLETE CBC W/AUTO DIFF WBC: CPT | Performed by: NURSE PRACTITIONER

## 2022-09-13 PROCEDURE — 80053 COMPREHEN METABOLIC PANEL: CPT | Performed by: NURSE PRACTITIONER

## 2022-09-13 PROCEDURE — 82607 VITAMIN B-12: CPT | Performed by: NURSE PRACTITIONER

## 2022-09-13 PROCEDURE — 83540 ASSAY OF IRON: CPT | Performed by: NURSE PRACTITIONER

## 2022-09-13 RX ORDER — MINOXIDIL 10 MG/1
10 TABLET ORAL
COMMUNITY
Start: 2022-07-30 | End: 2022-11-02 | Stop reason: SDUPTHER

## 2022-09-13 RX ORDER — LACOSAMIDE 50 MG/1
50 TABLET ORAL
COMMUNITY
Start: 2022-08-19 | End: 2022-11-14

## 2022-09-13 RX ORDER — METHYLPREDNISOLONE 4 MG/1
TABLET ORAL SEE ADMIN INSTRUCTIONS
COMMUNITY
Start: 2022-07-15 | End: 2022-10-17

## 2022-09-13 RX ORDER — AMLODIPINE BESYLATE 5 MG/1
5 TABLET ORAL DAILY
Qty: 30 TABLET | Refills: 1 | Status: SHIPPED | OUTPATIENT
Start: 2022-09-13 | End: 2022-10-17

## 2022-09-13 NOTE — PROGRESS NOTES
"Chief Complaint  Hypertension and Hospital Follow Up Visit ([facility] around 2 to 3 weeks ago)    Hien Herrera presents to [facility]  History of Present Illness  Patient presents the office today for a follow-up from the emergency room.  Patient has chronic kidney disease.  She is receiving dialysis 3 days a week.  She is seeing a nephrologist.  Blood pressure not controlled 146/110.  Patient had multiple electrolytes that were abnormal we will repeat today.  Patient's not been taking amlodipine.  Advised patient to check blood pressure before taking her pill.  Patient has epilepsy and is not following a neurologist.  We will refer today.  Patient is accompanied by her mother.  Patient does not drive.  She denies chest pain shortness of air.      Patient presents the office today for a follow-up from the emergency room.  Patient has chronic kidney disease.  She is receiving dialysis 3 days a week.  She is seeing a nephrologist.  Blood pressure not controlled 146/110.  Patient had multiple electrolytes that were abnormal we will repeat today.  Patient's not been taking amlodipine.  Advised patient to check blood pressure before taking her pill.  Patient has epilepsy and is not following a neurologist.  We will refer today.  Patient is accompanied by her mother.  Patient does not drive.  She denies chest pain shortness of air.          Objective   Vital Signs:  BP (!) 146/110 (BP Location: Left arm, Patient Position: Sitting, Cuff Size: Adult)   Pulse 75   Temp 98 °F (36.7 °C)   Resp 16   Ht 165.1 cm (65\")   Wt 67.7 kg (149 lb 3.2 oz)   SpO2 100%   BMI 24.83 kg/m²   Estimated body mass index is 24.83 kg/m² as calculated from the following:    Height as of this encounter: 165.1 cm (65\").    Weight as of this encounter: 67.7 kg (149 lb 3.2 oz).    BMI is within normal parameters. No other follow-up for BMI required.      Physical Exam  Constitutional:       " General: She is not in acute distress.     Appearance: Normal appearance.   Eyes:      Pupils: Pupils are equal, round, and reactive to light.   Cardiovascular:      Rate and Rhythm: Normal rate and regular rhythm.      Pulses: Normal pulses.      Heart sounds: Normal heart sounds.   Pulmonary:      Effort: Pulmonary effort is normal.      Breath sounds: Normal breath sounds. No wheezing or rales.   Neurological:      Mental Status: She is alert and oriented to person, place, and time.   Psychiatric:         Mood and Affect: Mood normal.         Behavior: Behavior normal.        Result Review :  The following data was reviewed by: CAESAR Basilio on 09/13/2022:  Common labs    Common Labsle 7/29/22 7/29/22 7/29/22 7/29/22 7/30/22 7/30/22 9/13/22 9/13/22    0726 0726 0726 2333 0701 0701 1544 1544   Glucose 106 (A) 101 (A)   88   77   BUN 53 (A) 52 (A)   26 (A)   8   Creatinine 7.77 (A) 7.69 (A)   4.63 (A)   3.94 (A)   Sodium 135 (A) 135 (A)   137   140   Potassium 4.8 4.8   4.3   3.3 (A)   Chloride 100 99   100   99   Calcium 7.9 (A) 7.8 (A)   8.0 (A)   8.4 (A)   Albumin 2.80 (A) 2.80 (A)   2.80 (A)   3.40 (A)   Total Bilirubin 0.2 0.2   0.5   0.4   Alkaline Phosphatase 46 45   47   47   AST (SGOT) 12 12   17   17   ALT (SGPT) 5 6   7   6   WBC   10.77   11.63 (A) 3.70    Hemoglobin   6.7 (A) 8.9 (A)  8.7 (A) 9.8 (A)    Hematocrit   21.7 (A) 28.5 (A)  27.7 (A) 31.3 (A)    Platelets   120 (A)   109 (A) 151    (A) Abnormal value            Assessment and Plan   Diagnoses and all orders for this visit:    1. Hypokalemia (Primary)  -     amLODIPine (Norvasc) 5 MG tablet; Take 1 tablet by mouth Daily.  Dispense: 30 tablet; Refill: 1  -     Comprehensive metabolic panel  -     Ambulatory Referral to Neurology  -     CBC & Differential  -     Ferritin  -     Iron Profile  -     Vitamin B12 & Folate    2. Hypocalcemia  -     amLODIPine (Norvasc) 5 MG tablet; Take 1 tablet by mouth Daily.  Dispense: 30 tablet; Refill:  1  -     Comprehensive metabolic panel  -     Ambulatory Referral to Neurology  -     CBC & Differential  -     Ferritin  -     Iron Profile  -     Vitamin B12 & Folate    3. Hypertension secondary to other renal disorders  -     amLODIPine (Norvasc) 5 MG tablet; Take 1 tablet by mouth Daily.  Dispense: 30 tablet; Refill: 1  -     Comprehensive metabolic panel  -     Ambulatory Referral to Neurology  -     CBC & Differential  -     Ferritin  -     Iron Profile  -     Vitamin B12 & Folate    4. Iron deficiency anemia, unspecified iron deficiency anemia type  -     amLODIPine (Norvasc) 5 MG tablet; Take 1 tablet by mouth Daily.  Dispense: 30 tablet; Refill: 1  -     Comprehensive metabolic panel  -     Ambulatory Referral to Neurology  -     CBC & Differential  -     Ferritin  -     Iron Profile  -     Vitamin B12 & Folate    5. Nonintractable epilepsy without status epilepticus, unspecified epilepsy type (HCC)  -     Ambulatory Referral to Neurology    6. Lupus nephritis, ISN/RPS class IV (HCC)           I spent 30 minutes caring for Dee on this date of service. This time includes time spent by me in the following activities:preparing for the visit, reviewing tests, obtaining and/or reviewing a separately obtained history, performing a medically appropriate examination and/or evaluation , counseling and educating the patient/family/caregiver, ordering medications, tests, or procedures, referring and communicating with other health care professionals , documenting information in the medical record, independently interpreting results and communicating that information with the patient/family/caregiver and care coordination  Follow Up   Return in about 4 weeks (around 10/11/2022) for Next scheduled follow up.  Patient was given instructions and counseling regarding her condition or for health maintenance advice. Please see specific information pulled into the AVS if appropriate.

## 2022-09-14 PROBLEM — D64.9 ANEMIA, UNSPECIFIED TYPE: Status: RESOLVED | Noted: 2022-04-21 | Resolved: 2022-09-14

## 2022-09-20 DIAGNOSIS — E87.6 HYPOKALEMIA: Primary | ICD-10-CM

## 2022-09-20 DIAGNOSIS — D50.9 IRON DEFICIENCY ANEMIA, UNSPECIFIED IRON DEFICIENCY ANEMIA TYPE: ICD-10-CM

## 2022-09-20 DIAGNOSIS — E83.51 HYPOCALCEMIA: ICD-10-CM

## 2022-09-20 DIAGNOSIS — D69.6 THROMBOCYTOPENIA: ICD-10-CM

## 2022-09-20 RX ORDER — POTASSIUM CHLORIDE 750 MG/1
10 TABLET, FILM COATED, EXTENDED RELEASE ORAL 2 TIMES DAILY
Qty: 20 TABLET | Refills: 0 | Status: SHIPPED | OUTPATIENT
Start: 2022-09-20 | End: 2022-09-27

## 2022-09-23 ENCOUNTER — LAB (OUTPATIENT)
Dept: FAMILY MEDICINE CLINIC | Facility: CLINIC | Age: 32
End: 2022-09-23

## 2022-09-23 DIAGNOSIS — E87.6 HYPOKALEMIA: ICD-10-CM

## 2022-09-23 DIAGNOSIS — D69.6 THROMBOCYTOPENIA: ICD-10-CM

## 2022-09-23 DIAGNOSIS — D50.9 IRON DEFICIENCY ANEMIA, UNSPECIFIED IRON DEFICIENCY ANEMIA TYPE: ICD-10-CM

## 2022-09-23 DIAGNOSIS — E83.51 HYPOCALCEMIA: ICD-10-CM

## 2022-09-23 LAB
ALBUMIN SERPL-MCNC: 3.5 G/DL (ref 3.5–5.2)
ALBUMIN/GLOB SERPL: 1.4 G/DL
ALP SERPL-CCNC: 46 U/L (ref 39–117)
ALT SERPL W P-5'-P-CCNC: 7 U/L (ref 1–33)
ANION GAP SERPL CALCULATED.3IONS-SCNC: 5 MMOL/L (ref 5–15)
AST SERPL-CCNC: 23 U/L (ref 1–32)
BILIRUB SERPL-MCNC: 0.4 MG/DL (ref 0–1.2)
BUN SERPL-MCNC: 13 MG/DL (ref 6–20)
BUN/CREAT SERPL: 2.3 (ref 7–25)
CALCIUM SPEC-SCNC: 8.4 MG/DL (ref 8.6–10.5)
CHLORIDE SERPL-SCNC: 100 MMOL/L (ref 98–107)
CO2 SERPL-SCNC: 32 MMOL/L (ref 22–29)
CREAT SERPL-MCNC: 5.7 MG/DL (ref 0.57–1)
EGFRCR SERPLBLD CKD-EPI 2021: 9.6 ML/MIN/1.73
ERYTHROCYTE [DISTWIDTH] IN BLOOD BY AUTOMATED COUNT: 20.2 % (ref 12.3–15.4)
GLOBULIN UR ELPH-MCNC: 2.5 GM/DL
GLUCOSE SERPL-MCNC: 94 MG/DL (ref 65–99)
HCT VFR BLD AUTO: 29.4 % (ref 34–46.6)
HGB BLD-MCNC: 9.2 G/DL (ref 12–15.9)
LYMPHOCYTES # BLD AUTO: 2.4 10*3/MM3 (ref 0.7–3.1)
LYMPHOCYTES NFR BLD AUTO: 56.3 % (ref 19.6–45.3)
MCH RBC QN AUTO: 22.4 PG (ref 26.6–33)
MCHC RBC AUTO-ENTMCNC: 31.5 G/DL (ref 31.5–35.7)
MCV RBC AUTO: 71.2 FL (ref 79–97)
MONOCYTES # BLD AUTO: 0.5 10*3/MM3 (ref 0.1–0.9)
MONOCYTES NFR BLD AUTO: 11.4 % (ref 5–12)
NEUTROPHILS NFR BLD AUTO: 1.4 10*3/MM3 (ref 1.7–7)
NEUTROPHILS NFR BLD AUTO: 32.3 % (ref 42.7–76)
PLATELET # BLD AUTO: 107 10*3/MM3 (ref 140–450)
PMV BLD AUTO: 10.9 FL (ref 6–12)
POTASSIUM SERPL-SCNC: 3.9 MMOL/L (ref 3.5–5.2)
PROT SERPL-MCNC: 6 G/DL (ref 6–8.5)
RBC # BLD AUTO: 4.12 10*6/MM3 (ref 3.77–5.28)
SODIUM SERPL-SCNC: 137 MMOL/L (ref 136–145)
WBC NRBC COR # BLD: 4.2 10*3/MM3 (ref 3.4–10.8)

## 2022-09-23 PROCEDURE — 80053 COMPREHEN METABOLIC PANEL: CPT | Performed by: NURSE PRACTITIONER

## 2022-09-23 PROCEDURE — 36415 COLL VENOUS BLD VENIPUNCTURE: CPT | Performed by: NURSE PRACTITIONER

## 2022-09-23 PROCEDURE — 85025 COMPLETE CBC W/AUTO DIFF WBC: CPT | Performed by: NURSE PRACTITIONER

## 2022-09-27 DIAGNOSIS — E87.6 HYPOKALEMIA: ICD-10-CM

## 2022-09-27 DIAGNOSIS — D50.9 IRON DEFICIENCY ANEMIA, UNSPECIFIED IRON DEFICIENCY ANEMIA TYPE: ICD-10-CM

## 2022-09-27 DIAGNOSIS — D69.6 THROMBOCYTOPENIA: ICD-10-CM

## 2022-09-27 DIAGNOSIS — E83.51 HYPOCALCEMIA: ICD-10-CM

## 2022-09-27 RX ORDER — POTASSIUM CHLORIDE 750 MG/1
TABLET, FILM COATED, EXTENDED RELEASE ORAL
Qty: 20 TABLET | Refills: 0 | Status: SHIPPED | OUTPATIENT
Start: 2022-09-27 | End: 2022-10-07

## 2022-09-28 ENCOUNTER — TRANSCRIBE ORDERS (OUTPATIENT)
Dept: ADMINISTRATIVE | Facility: HOSPITAL | Age: 32
End: 2022-09-28

## 2022-09-28 DIAGNOSIS — M32.14 LUPUS GLOMERULONEPHRITIS: Primary | ICD-10-CM

## 2022-10-01 ENCOUNTER — APPOINTMENT (OUTPATIENT)
Dept: ONCOLOGY | Facility: HOSPITAL | Age: 32
End: 2022-10-01

## 2022-10-07 DIAGNOSIS — D50.9 IRON DEFICIENCY ANEMIA, UNSPECIFIED IRON DEFICIENCY ANEMIA TYPE: ICD-10-CM

## 2022-10-07 DIAGNOSIS — D69.6 THROMBOCYTOPENIA: ICD-10-CM

## 2022-10-07 DIAGNOSIS — E83.51 HYPOCALCEMIA: ICD-10-CM

## 2022-10-07 DIAGNOSIS — E87.6 HYPOKALEMIA: ICD-10-CM

## 2022-10-07 DIAGNOSIS — M32.14 LUPUS NEPHRITIS, ISN/RPS CLASS IV: Primary | ICD-10-CM

## 2022-10-07 RX ORDER — POTASSIUM CHLORIDE 750 MG/1
TABLET, FILM COATED, EXTENDED RELEASE ORAL
Qty: 20 TABLET | Refills: 0 | Status: SHIPPED | OUTPATIENT
Start: 2022-10-07 | End: 2022-12-08

## 2022-10-07 RX ORDER — SODIUM CHLORIDE 9 MG/ML
250 INJECTION, SOLUTION INTRAVENOUS ONCE
Status: CANCELLED | OUTPATIENT
Start: 2022-10-10

## 2022-10-07 RX ORDER — METHYLPREDNISOLONE SODIUM SUCCINATE 125 MG/2ML
50 INJECTION, POWDER, LYOPHILIZED, FOR SOLUTION INTRAMUSCULAR; INTRAVENOUS ONCE
Status: CANCELLED
Start: 2022-10-10 | End: 2022-10-10

## 2022-10-17 ENCOUNTER — OFFICE VISIT (OUTPATIENT)
Dept: FAMILY MEDICINE CLINIC | Facility: CLINIC | Age: 32
End: 2022-10-17

## 2022-10-17 VITALS
OXYGEN SATURATION: 97 % | HEART RATE: 103 BPM | WEIGHT: 132.8 LBS | HEIGHT: 65 IN | DIASTOLIC BLOOD PRESSURE: 102 MMHG | TEMPERATURE: 100.9 F | RESPIRATION RATE: 16 BRPM | SYSTOLIC BLOOD PRESSURE: 156 MMHG | BODY MASS INDEX: 22.13 KG/M2

## 2022-10-17 DIAGNOSIS — E83.51 HYPOCALCEMIA: ICD-10-CM

## 2022-10-17 DIAGNOSIS — Z09 HOSPITAL DISCHARGE FOLLOW-UP: Primary | ICD-10-CM

## 2022-10-17 DIAGNOSIS — N28.89 HYPERTENSION SECONDARY TO OTHER RENAL DISORDERS: ICD-10-CM

## 2022-10-17 DIAGNOSIS — D50.9 IRON DEFICIENCY ANEMIA, UNSPECIFIED IRON DEFICIENCY ANEMIA TYPE: ICD-10-CM

## 2022-10-17 DIAGNOSIS — U07.1 COVID-19: ICD-10-CM

## 2022-10-17 DIAGNOSIS — I15.1 HYPERTENSION SECONDARY TO OTHER RENAL DISORDERS: ICD-10-CM

## 2022-10-17 PROCEDURE — 99214 OFFICE O/P EST MOD 30 MIN: CPT | Performed by: NURSE PRACTITIONER

## 2022-10-17 RX ORDER — AMLODIPINE BESYLATE 10 MG/1
10 TABLET ORAL DAILY
Qty: 30 TABLET | Refills: 1 | Status: SHIPPED | OUTPATIENT
Start: 2022-10-17 | End: 2022-11-14 | Stop reason: SDUPTHER

## 2022-10-17 RX ORDER — MYCOPHENOLATE MOFETIL 500 MG/1
1000 TABLET ORAL
COMMUNITY
Start: 2022-10-12 | End: 2022-11-11

## 2022-10-17 RX ORDER — NIFEDIPINE 60 MG/1
TABLET, EXTENDED RELEASE ORAL
COMMUNITY
Start: 2022-10-12 | End: 2022-11-14

## 2022-10-17 RX ORDER — PREDNISONE 20 MG/1
60 TABLET ORAL
COMMUNITY
Start: 2022-10-12 | End: 2022-11-11

## 2022-10-18 ENCOUNTER — APPOINTMENT (OUTPATIENT)
Dept: ONCOLOGY | Facility: HOSPITAL | Age: 32
End: 2022-10-18

## 2022-10-18 NOTE — PROGRESS NOTES
Transitional Care Follow Up Visit  Subjective     Dee Herrera is a 32 y.o. female who presents for a transitional care management visit.    Within 48 business hours after discharge our office contacted her via telephone to coordinate her care and needs.      I reviewed and discussed the details of that call along with the discharge summary, hospital problems, inpatient lab results, inpatient diagnostic studies, and consultation reports with Dee.     Current outpatient and discharge medications have been reconciled for the patient.  Reviewed by: CAESAR Basilio      Date of TCM Phone Call 7/30/2022   [facility]   Date of Admission 7/20/2022   Date of Discharge 7/30/2022   Discharge Disposition Home or Self Care     Risk for Readmission (LACE) No data recorded    History of Present Illness  Patient presents to the office today for hospital discharge follow-up. 10/6-10/12/2022. Patient has a past medical history of end-stage renal disease.  She has seizures and follows neurology.  She presented to the hospital with complaints of a cough, shortness of air and mental fogging.  Patient was positive for COVID.  She reports cough and congestion have improved.  She is taking medication per nephrology and neurology.  Amlodipine was increased to 10 mg.  Blood pressure is elevated today at 156/102.           Course During Hospital Stay:  10/6/2022-10/12/2022     The following portions of the patient's history were reviewed and updated as appropriate: allergies, current medications, past family history, past medical history, past social history, past surgical history and problem list.    Review of Systems   Constitutional: Negative for activity change and appetite change.   HENT: Negative for sore throat.    Eyes: Negative for visual disturbance.   Respiratory: Negative for cough and wheezing.    Cardiovascular: Negative for chest pain and leg swelling.   Gastrointestinal: Negative for nausea.   Endocrine:  Negative for cold intolerance and heat intolerance.   Genitourinary: Negative for dysuria.   Musculoskeletal: Negative for arthralgias.   Skin: Negative for color change and pallor.   Allergic/Immunologic: Negative for environmental allergies.   Neurological: Negative for dizziness, seizures and headaches.   Hematological: Negative for adenopathy. Does not bruise/bleed easily.   Psychiatric/Behavioral: The patient is not nervous/anxious.        Objective   Physical Exam  Constitutional:       General: She is not in acute distress.     Appearance: Normal appearance.   Eyes:      Pupils: Pupils are equal, round, and reactive to light.   Cardiovascular:      Rate and Rhythm: Normal rate and regular rhythm.      Pulses: Normal pulses.      Heart sounds: Normal heart sounds.   Pulmonary:      Effort: Pulmonary effort is normal.      Breath sounds: Normal breath sounds. No wheezing or rales.   Neurological:      Mental Status: She is alert.   Psychiatric:         Mood and Affect: Mood normal.         Behavior: Behavior normal.         Assessment & Plan   Problems Addressed this Visit        Cardiac and Vasculature    Hypertension secondary to other renal disorders     Blood pressure elevated increase amlodipine to 10 mg.  Patient to follow-up in 1 week with readings.  Continue Dash diet.         Relevant Medications    NIFEdipine XL (PROCARDIA XL) 60 MG 24 hr tablet    amLODIPine (NORVASC) 10 MG tablet    Other Relevant Orders    CBC & Differential    Comprehensive Metabolic Panel    Lipid Panel       Genitourinary and Reproductive     Hypocalcemia    Relevant Orders    CBC & Differential    Comprehensive Metabolic Panel    Lipid Panel       Health Encounters    Hospital discharge follow-up - Primary       Hematology and Neoplasia    Iron deficiency anemia, unspecified iron deficiency anemia type    Relevant Orders    CBC & Differential    Comprehensive Metabolic Panel    Lipid Panel       Other    COVID-19     Resolved         Diagnoses       Codes Comments    Hospital discharge follow-up    -  Primary ICD-10-CM: Z09  ICD-9-CM: V67.59     Hypertension secondary to other renal disorders     ICD-10-CM: I15.1, N28.89  ICD-9-CM: 405.91, 593.89     Hypocalcemia     ICD-10-CM: E83.51  ICD-9-CM: 275.41     Iron deficiency anemia, unspecified iron deficiency anemia type     ICD-10-CM: D50.9  ICD-9-CM: 280.9     COVID-19     ICD-10-CM: U07.1  ICD-9-CM: 079.89

## 2022-10-19 ENCOUNTER — APPOINTMENT (OUTPATIENT)
Dept: ONCOLOGY | Facility: HOSPITAL | Age: 32
End: 2022-10-19

## 2022-10-19 ENCOUNTER — INFUSION (OUTPATIENT)
Dept: ONCOLOGY | Facility: HOSPITAL | Age: 32
End: 2022-10-19

## 2022-10-19 VITALS
DIASTOLIC BLOOD PRESSURE: 86 MMHG | BODY MASS INDEX: 22 KG/M2 | TEMPERATURE: 100.5 F | RESPIRATION RATE: 18 BRPM | WEIGHT: 132.2 LBS | SYSTOLIC BLOOD PRESSURE: 146 MMHG

## 2022-10-19 DIAGNOSIS — M32.14 LUPUS NEPHRITIS, ISN/RPS CLASS IV: ICD-10-CM

## 2022-10-19 PROBLEM — U07.1 COVID-19: Status: ACTIVE | Noted: 2022-10-19

## 2022-10-19 PROBLEM — Z09 HOSPITAL DISCHARGE FOLLOW-UP: Status: ACTIVE | Noted: 2022-10-19

## 2022-10-19 LAB
ALBUMIN SERPL-MCNC: 3.8 G/DL (ref 3.5–5.2)
ALBUMIN/GLOB SERPL: 1.4 G/DL
ALP SERPL-CCNC: 44 U/L (ref 39–117)
ALT SERPL W P-5'-P-CCNC: 8 U/L (ref 1–33)
ANION GAP SERPL CALCULATED.3IONS-SCNC: 8 MMOL/L (ref 5–15)
AST SERPL-CCNC: 19 U/L (ref 1–32)
BASOPHILS # BLD AUTO: 0.01 10*3/MM3 (ref 0–0.2)
BASOPHILS NFR BLD AUTO: 0.2 % (ref 0–1.5)
BILIRUB SERPL-MCNC: 0.2 MG/DL (ref 0–1.2)
BUN SERPL-MCNC: 20 MG/DL (ref 6–20)
BUN/CREAT SERPL: 3.8 (ref 7–25)
CALCIUM SPEC-SCNC: 9.4 MG/DL (ref 8.6–10.5)
CHLORIDE SERPL-SCNC: 98 MMOL/L (ref 98–107)
CO2 SERPL-SCNC: 33 MMOL/L (ref 22–29)
CREAT SERPL-MCNC: 5.23 MG/DL (ref 0.57–1)
DEPRECATED RDW RBC AUTO: 54.5 FL (ref 37–54)
EGFRCR SERPLBLD CKD-EPI 2021: 10.7 ML/MIN/1.73
EOSINOPHIL # BLD AUTO: 0 10*3/MM3 (ref 0–0.4)
EOSINOPHIL NFR BLD AUTO: 0 % (ref 0.3–6.2)
ERYTHROCYTE [DISTWIDTH] IN BLOOD BY AUTOMATED COUNT: 22 % (ref 12.3–15.4)
GLOBULIN UR ELPH-MCNC: 2.8 GM/DL
GLUCOSE SERPL-MCNC: 140 MG/DL (ref 65–99)
HCG SERPL QL: NEGATIVE
HCT VFR BLD AUTO: 25.4 % (ref 34–46.6)
HGB BLD-MCNC: 7.2 G/DL (ref 12–15.9)
HYPOCHROMIA BLD QL: NORMAL
IMM GRANULOCYTES # BLD AUTO: 0.01 10*3/MM3 (ref 0–0.05)
IMM GRANULOCYTES NFR BLD AUTO: 0.2 % (ref 0–0.5)
LYMPHOCYTES # BLD AUTO: 1.71 10*3/MM3 (ref 0.7–3.1)
LYMPHOCYTES NFR BLD AUTO: 38.7 % (ref 19.6–45.3)
MCH RBC QN AUTO: 20.1 PG (ref 26.6–33)
MCHC RBC AUTO-ENTMCNC: 28.3 G/DL (ref 31.5–35.7)
MCV RBC AUTO: 70.8 FL (ref 79–97)
MONOCYTES # BLD AUTO: 0.82 10*3/MM3 (ref 0.1–0.9)
MONOCYTES NFR BLD AUTO: 18.6 % (ref 5–12)
NEUTROPHILS NFR BLD AUTO: 1.87 10*3/MM3 (ref 1.7–7)
NEUTROPHILS NFR BLD AUTO: 42.3 % (ref 42.7–76)
NRBC BLD AUTO-RTO: 0 /100 WBC (ref 0–0.2)
PLAT MORPH BLD: NORMAL
PLATELET # BLD AUTO: 136 10*3/MM3 (ref 140–450)
POIKILOCYTOSIS BLD QL SMEAR: NORMAL
POTASSIUM SERPL-SCNC: 4 MMOL/L (ref 3.5–5.2)
PROT SERPL-MCNC: 6.6 G/DL (ref 6–8.5)
RBC # BLD AUTO: 3.59 10*6/MM3 (ref 3.77–5.28)
SODIUM SERPL-SCNC: 139 MMOL/L (ref 136–145)
TARGETS BLD QL SMEAR: NORMAL
WBC MORPH BLD: NORMAL
WBC NRBC COR # BLD: 4.42 10*3/MM3 (ref 3.4–10.8)

## 2022-10-19 PROCEDURE — 85025 COMPLETE CBC W/AUTO DIFF WBC: CPT | Performed by: INTERNAL MEDICINE

## 2022-10-19 PROCEDURE — 85007 BL SMEAR W/DIFF WBC COUNT: CPT | Performed by: INTERNAL MEDICINE

## 2022-10-19 PROCEDURE — 84703 CHORIONIC GONADOTROPIN ASSAY: CPT | Performed by: INTERNAL MEDICINE

## 2022-10-19 PROCEDURE — 36415 COLL VENOUS BLD VENIPUNCTURE: CPT

## 2022-10-19 PROCEDURE — 80053 COMPREHEN METABOLIC PANEL: CPT | Performed by: INTERNAL MEDICINE

## 2022-10-19 NOTE — ASSESSMENT & PLAN NOTE
Blood pressure elevated increase amlodipine to 10 mg.  Patient to follow-up in 1 week with readings.  Continue Dash diet.

## 2022-10-19 NOTE — NURSING NOTE
Pt arrived for C1 Cytoxan/Mesna tx. Pt arrived with temp of 100.9. Labs collected for tx. Placed call to Dr. Thomas. Per MD Thomas hold Cytoxan tx due to fever, and he will see pt in Dialysis clinic tomorrow.      Pt labs resulted hgb 7.2. Called pt and let her know results. Encouraged pt to go to ER, pt denied. Pt states she is being seen in the dialysis clinic tomorrow where it can be addressed. Again stressed the importance of hgb being low to pt, and could require a possible blood transfusion. Pt denied being seen today.

## 2022-11-02 ENCOUNTER — INFUSION (OUTPATIENT)
Dept: ONCOLOGY | Facility: HOSPITAL | Age: 32
End: 2022-11-02

## 2022-11-02 VITALS
DIASTOLIC BLOOD PRESSURE: 96 MMHG | WEIGHT: 129.8 LBS | TEMPERATURE: 98.9 F | OXYGEN SATURATION: 100 % | HEART RATE: 82 BPM | BODY MASS INDEX: 20.86 KG/M2 | SYSTOLIC BLOOD PRESSURE: 159 MMHG | RESPIRATION RATE: 18 BRPM | HEIGHT: 66 IN

## 2022-11-02 DIAGNOSIS — M32.14 LUPUS NEPHRITIS, ISN/RPS CLASS IV: Primary | ICD-10-CM

## 2022-11-02 LAB
ALBUMIN SERPL-MCNC: 3.7 G/DL (ref 3.5–5.2)
ALBUMIN/GLOB SERPL: 1.3 G/DL
ALP SERPL-CCNC: 46 U/L (ref 39–117)
ALT SERPL W P-5'-P-CCNC: 9 U/L (ref 1–33)
ANION GAP SERPL CALCULATED.3IONS-SCNC: 8.9 MMOL/L (ref 5–15)
AST SERPL-CCNC: 19 U/L (ref 1–32)
BASOPHILS # BLD AUTO: 0.01 10*3/MM3 (ref 0–0.2)
BASOPHILS NFR BLD AUTO: 0.3 % (ref 0–1.5)
BILIRUB SERPL-MCNC: 0.3 MG/DL (ref 0–1.2)
BUN SERPL-MCNC: 28 MG/DL (ref 6–20)
BUN/CREAT SERPL: 3.9 (ref 7–25)
C3 FRG RBC-MCNC: NORMAL
CALCIUM SPEC-SCNC: 9.4 MG/DL (ref 8.6–10.5)
CHLORIDE SERPL-SCNC: 99 MMOL/L (ref 98–107)
CO2 SERPL-SCNC: 31.1 MMOL/L (ref 22–29)
CREAT SERPL-MCNC: 7.19 MG/DL (ref 0.57–1)
DACRYOCYTES BLD QL SMEAR: NORMAL
DEPRECATED RDW RBC AUTO: 62.4 FL (ref 37–54)
EGFRCR SERPLBLD CKD-EPI 2021: 7.3 ML/MIN/1.73
EOSINOPHIL # BLD AUTO: 0 10*3/MM3 (ref 0–0.4)
EOSINOPHIL NFR BLD AUTO: 0 % (ref 0.3–6.2)
ERYTHROCYTE [DISTWIDTH] IN BLOOD BY AUTOMATED COUNT: 23.9 % (ref 12.3–15.4)
GLOBULIN UR ELPH-MCNC: 2.8 GM/DL
GLUCOSE SERPL-MCNC: 111 MG/DL (ref 65–99)
HCG SERPL QL: NEGATIVE
HCT VFR BLD AUTO: 32 % (ref 34–46.6)
HGB BLD-MCNC: 9.3 G/DL (ref 12–15.9)
HYPOCHROMIA BLD QL: NORMAL
IMM GRANULOCYTES # BLD AUTO: 0.02 10*3/MM3 (ref 0–0.05)
IMM GRANULOCYTES NFR BLD AUTO: 0.6 % (ref 0–0.5)
LYMPHOCYTES # BLD AUTO: 1.21 10*3/MM3 (ref 0.7–3.1)
LYMPHOCYTES NFR BLD AUTO: 37.1 % (ref 19.6–45.3)
MCH RBC QN AUTO: 21.4 PG (ref 26.6–33)
MCHC RBC AUTO-ENTMCNC: 29.1 G/DL (ref 31.5–35.7)
MCV RBC AUTO: 73.6 FL (ref 79–97)
MONOCYTES # BLD AUTO: 0.67 10*3/MM3 (ref 0.1–0.9)
MONOCYTES NFR BLD AUTO: 20.6 % (ref 5–12)
NEUTROPHILS NFR BLD AUTO: 1.35 10*3/MM3 (ref 1.7–7)
NEUTROPHILS NFR BLD AUTO: 41.4 % (ref 42.7–76)
NRBC BLD AUTO-RTO: 0 /100 WBC (ref 0–0.2)
OVALOCYTES BLD QL SMEAR: NORMAL
PLAT MORPH BLD: NORMAL
PLATELET # BLD AUTO: 119 10*3/MM3 (ref 140–450)
POLYCHROMASIA BLD QL SMEAR: NORMAL
POTASSIUM SERPL-SCNC: 4 MMOL/L (ref 3.5–5.2)
PROT SERPL-MCNC: 6.5 G/DL (ref 6–8.5)
RBC # BLD AUTO: 4.35 10*6/MM3 (ref 3.77–5.28)
SODIUM SERPL-SCNC: 139 MMOL/L (ref 136–145)
TARGETS BLD QL SMEAR: NORMAL
WBC MORPH BLD: NORMAL
WBC NRBC COR # BLD: 3.26 10*3/MM3 (ref 3.4–10.8)

## 2022-11-02 PROCEDURE — 85025 COMPLETE CBC W/AUTO DIFF WBC: CPT | Performed by: INTERNAL MEDICINE

## 2022-11-02 PROCEDURE — 25010000002 ONDANSETRON PER 1 MG: Performed by: INTERNAL MEDICINE

## 2022-11-02 PROCEDURE — 25010000002 CYCLOPHOSPHAMIDE 1 GM/5ML SOLUTION 5 ML VIAL: Performed by: INTERNAL MEDICINE

## 2022-11-02 PROCEDURE — 25010000002 MESNA PER 200 MG: Performed by: INTERNAL MEDICINE

## 2022-11-02 PROCEDURE — 80053 COMPREHEN METABOLIC PANEL: CPT | Performed by: INTERNAL MEDICINE

## 2022-11-02 PROCEDURE — 96375 TX/PRO/DX INJ NEW DRUG ADDON: CPT

## 2022-11-02 PROCEDURE — 96413 CHEMO IV INFUSION 1 HR: CPT

## 2022-11-02 PROCEDURE — 85007 BL SMEAR W/DIFF WBC COUNT: CPT | Performed by: INTERNAL MEDICINE

## 2022-11-02 PROCEDURE — 36415 COLL VENOUS BLD VENIPUNCTURE: CPT

## 2022-11-02 PROCEDURE — 84703 CHORIONIC GONADOTROPIN ASSAY: CPT

## 2022-11-02 PROCEDURE — 25010000002 METHYLPREDNISOLONE PER 125 MG: Performed by: INTERNAL MEDICINE

## 2022-11-02 RX ORDER — METHYLPREDNISOLONE SODIUM SUCCINATE 125 MG/2ML
50 INJECTION, POWDER, LYOPHILIZED, FOR SOLUTION INTRAMUSCULAR; INTRAVENOUS ONCE
Start: 2022-11-16 | End: 2022-11-16

## 2022-11-02 RX ORDER — SODIUM CHLORIDE 9 MG/ML
250 INJECTION, SOLUTION INTRAVENOUS ONCE
Status: COMPLETED | OUTPATIENT
Start: 2022-11-02 | End: 2022-11-02

## 2022-11-02 RX ORDER — MINOXIDIL 10 MG/1
10 TABLET ORAL DAILY
Qty: 90 TABLET | Refills: 1 | Status: ON HOLD | OUTPATIENT
Start: 2022-11-02 | End: 2023-02-15

## 2022-11-02 RX ORDER — METHYLPREDNISOLONE SODIUM SUCCINATE 125 MG/2ML
50 INJECTION, POWDER, LYOPHILIZED, FOR SOLUTION INTRAMUSCULAR; INTRAVENOUS ONCE
Status: CANCELLED
Start: 2022-11-16 | End: 2022-11-16

## 2022-11-02 RX ORDER — METHYLPREDNISOLONE SODIUM SUCCINATE 125 MG/2ML
50 INJECTION, POWDER, LYOPHILIZED, FOR SOLUTION INTRAMUSCULAR; INTRAVENOUS ONCE
Status: COMPLETED | OUTPATIENT
Start: 2022-11-02 | End: 2022-11-02

## 2022-11-02 RX ORDER — SODIUM CHLORIDE 9 MG/ML
250 INJECTION, SOLUTION INTRAVENOUS ONCE
OUTPATIENT
Start: 2022-11-16

## 2022-11-02 RX ORDER — SODIUM CHLORIDE 9 MG/ML
250 INJECTION, SOLUTION INTRAVENOUS ONCE
Status: CANCELLED | OUTPATIENT
Start: 2022-11-16

## 2022-11-02 RX ADMIN — CYCLOPHOSPHAMIDE 500 MG: 200 INJECTION, SOLUTION INTRAVENOUS at 11:26

## 2022-11-02 RX ADMIN — SODIUM CHLORIDE 250 ML: 9 INJECTION, SOLUTION INTRAVENOUS at 10:42

## 2022-11-02 RX ADMIN — METHYLPREDNISOLONE SODIUM SUCCINATE 50 MG: 125 INJECTION, POWDER, FOR SOLUTION INTRAMUSCULAR; INTRAVENOUS at 10:38

## 2022-11-02 RX ADMIN — ONDANSETRON: 2 SOLUTION INTRAMUSCULAR; INTRAVENOUS at 10:41

## 2022-11-02 NOTE — NURSING NOTE
Pt to infusion for first treatment with cytoxan/mesna  Written information given to patient on medication, indication, side effects.   Questions , concerns addressed and answered.  Pt V/U.   Chemo permit signed.

## 2022-11-14 ENCOUNTER — OFFICE VISIT (OUTPATIENT)
Dept: FAMILY MEDICINE CLINIC | Facility: CLINIC | Age: 32
End: 2022-11-14

## 2022-11-14 VITALS
HEART RATE: 84 BPM | HEIGHT: 66 IN | SYSTOLIC BLOOD PRESSURE: 144 MMHG | DIASTOLIC BLOOD PRESSURE: 100 MMHG | TEMPERATURE: 99.1 F | WEIGHT: 134.8 LBS | BODY MASS INDEX: 21.66 KG/M2

## 2022-11-14 DIAGNOSIS — I15.1 HYPERTENSION SECONDARY TO OTHER RENAL DISORDERS: Primary | ICD-10-CM

## 2022-11-14 DIAGNOSIS — N28.89 HYPERTENSION SECONDARY TO OTHER RENAL DISORDERS: Primary | ICD-10-CM

## 2022-11-14 DIAGNOSIS — N18.5 STAGE 5 CHRONIC KIDNEY DISEASE: ICD-10-CM

## 2022-11-14 DIAGNOSIS — M32.9 SYSTEMIC LUPUS ERYTHEMATOSUS, UNSPECIFIED SLE TYPE, UNSPECIFIED ORGAN INVOLVEMENT STATUS: ICD-10-CM

## 2022-11-14 PROCEDURE — 99213 OFFICE O/P EST LOW 20 MIN: CPT | Performed by: NURSE PRACTITIONER

## 2022-11-14 RX ORDER — SULFAMETHOXAZOLE AND TRIMETHOPRIM 400; 80 MG/1; MG/1
TABLET ORAL
COMMUNITY
Start: 2022-11-07 | End: 2023-02-01

## 2022-11-14 RX ORDER — AMLODIPINE BESYLATE 10 MG/1
10 TABLET ORAL DAILY
Qty: 90 TABLET | Refills: 1 | Status: SHIPPED | OUTPATIENT
Start: 2022-11-14 | End: 2023-02-01 | Stop reason: ALTCHOICE

## 2022-11-14 RX ORDER — CARVEDILOL 3.12 MG/1
3.12 TABLET ORAL 2 TIMES DAILY WITH MEALS
Qty: 180 TABLET | Refills: 1 | Status: SHIPPED | OUTPATIENT
Start: 2022-11-14 | End: 2022-12-08 | Stop reason: DRUGHIGH

## 2022-11-14 RX ORDER — PREDNISONE 10 MG/1
20 TABLET ORAL DAILY
Qty: 60 TABLET | Refills: 11 | COMMUNITY
Start: 2022-09-27 | End: 2023-02-18 | Stop reason: HOSPADM

## 2022-11-14 NOTE — PROGRESS NOTES
"Chief Complaint  Follow-up (4 week follow up)    Subjective        Dee Herrera presents to [facility]  History of Present Illness  Patient presents the office today for a 4-week follow-up.  She has hypertension and was elevated last visit.  I increased amlodipine to 10 mg patient was to monito blood pressure.  She reports improvement.  Today blood pressure is elevated 144/100 she reports it has improved since last visit.  Advised to continue to monitor blood pressure watch diet and exercise.  She is currently seeing nephrology she is stage V kidney disease.  She is on intermittent hemodialysis.                            Objective   Vital Signs:  /100 (BP Location: Left arm, Patient Position: Sitting, Cuff Size: Adult)   Pulse 84   Temp 99.1 °F (37.3 °C)   Ht 166.4 cm (65.5\")   Wt 61.1 kg (134 lb 12.8 oz)   BMI 22.09 kg/m²   Estimated body mass index is 22.09 kg/m² as calculated from the following:    Height as of this encounter: 166.4 cm (65.5\").    Weight as of this encounter: 61.1 kg (134 lb 12.8 oz).    BMI is within normal parameters. No other follow-up for BMI required.      Physical Exam  Constitutional:       General: She is not in acute distress.     Appearance: Normal appearance.   Eyes:      Pupils: Pupils are equal, round, and reactive to light.   Cardiovascular:      Rate and Rhythm: Normal rate and regular rhythm.      Pulses: Normal pulses.      Heart sounds: Normal heart sounds.   Pulmonary:      Effort: Pulmonary effort is normal.      Breath sounds: Normal breath sounds. No wheezing or rales.   Neurological:      Mental Status: She is alert.   Psychiatric:         Mood and Affect: Mood normal.         Behavior: Behavior normal.        Result Review :                Assessment and Plan   Diagnoses and all orders for this visit:    1. Hypertension secondary to other renal disorders (Primary)  Assessment & Plan:  BP elevated but has improved since increasing " amlodipine to 10 mg.  Patient to continue to monitor.  DASH diet discussed with patient.    Orders:  -     amLODIPine (NORVASC) 10 MG tablet; Take 1 tablet by mouth Daily.  Dispense: 90 tablet; Refill: 1    2. Stage 5 chronic kidney disease (HCC)  Assessment & Plan:  Follows nephrology, intermittent hemodialysis.      3. Systemic lupus erythematosus, unspecified SLE type, unspecified organ involvement status (HCC)  Assessment & Plan:  Follows neurology      Other orders  -     carvedilol (COREG) 3.125 MG tablet; Take 1 tablet by mouth 2 (Two) Times a Day With Meals.  Dispense: 180 tablet; Refill: 1           Follow Up   No follow-ups on file.  Patient was given instructions and counseling regarding her condition or for health maintenance advice. Please see specific information pulled into the AVS if appropriate.

## 2022-11-15 PROBLEM — N18.5 STAGE 5 CHRONIC KIDNEY DISEASE: Status: ACTIVE | Noted: 2022-11-15

## 2022-11-15 NOTE — ASSESSMENT & PLAN NOTE
BP elevated but has improved since increasing amlodipine to 10 mg.  Patient to continue to monitor.  DASH diet discussed with patient.

## 2022-11-16 ENCOUNTER — INFUSION (OUTPATIENT)
Dept: ONCOLOGY | Facility: HOSPITAL | Age: 32
End: 2022-11-16

## 2022-11-16 VITALS
RESPIRATION RATE: 18 BRPM | WEIGHT: 130.2 LBS | BODY MASS INDEX: 20.93 KG/M2 | HEIGHT: 66 IN | TEMPERATURE: 99 F | HEART RATE: 59 BPM

## 2022-11-16 DIAGNOSIS — M32.14 LUPUS NEPHRITIS, ISN/RPS CLASS IV: ICD-10-CM

## 2022-11-16 LAB
ALBUMIN SERPL-MCNC: 3.7 G/DL (ref 3.5–5.2)
ALBUMIN/GLOB SERPL: 1.3 G/DL
ALP SERPL-CCNC: 48 U/L (ref 39–117)
ALT SERPL W P-5'-P-CCNC: 9 U/L (ref 1–33)
ANION GAP SERPL CALCULATED.3IONS-SCNC: 8 MMOL/L (ref 5–15)
AST SERPL-CCNC: 21 U/L (ref 1–32)
BASOPHILS # BLD AUTO: 0 10*3/MM3 (ref 0–0.2)
BASOPHILS NFR BLD AUTO: 0 % (ref 0–1.5)
BILIRUB SERPL-MCNC: 0.3 MG/DL (ref 0–1.2)
BUN SERPL-MCNC: 17 MG/DL (ref 6–20)
BUN/CREAT SERPL: 2.6 (ref 7–25)
CALCIUM SPEC-SCNC: 9.2 MG/DL (ref 8.6–10.5)
CHLORIDE SERPL-SCNC: 100 MMOL/L (ref 98–107)
CO2 SERPL-SCNC: 32 MMOL/L (ref 22–29)
CREAT SERPL-MCNC: 6.45 MG/DL (ref 0.57–1)
DEPRECATED RDW RBC AUTO: 56 FL (ref 37–54)
EGFRCR SERPLBLD CKD-EPI 2021: 8.3 ML/MIN/1.73
EOSINOPHIL # BLD AUTO: 0 10*3/MM3 (ref 0–0.4)
EOSINOPHIL NFR BLD AUTO: 0 % (ref 0.3–6.2)
ERYTHROCYTE [DISTWIDTH] IN BLOOD BY AUTOMATED COUNT: 21.9 % (ref 12.3–15.4)
GLOBULIN UR ELPH-MCNC: 2.9 GM/DL
GLUCOSE SERPL-MCNC: 104 MG/DL (ref 65–99)
HCG SERPL QL: NEGATIVE
HCT VFR BLD AUTO: 35.7 % (ref 34–46.6)
HGB BLD-MCNC: 10.1 G/DL (ref 12–15.9)
IMM GRANULOCYTES # BLD AUTO: 0.01 10*3/MM3 (ref 0–0.05)
IMM GRANULOCYTES NFR BLD AUTO: 0.5 % (ref 0–0.5)
LYMPHOCYTES # BLD AUTO: 0.69 10*3/MM3 (ref 0.7–3.1)
LYMPHOCYTES NFR BLD AUTO: 34 % (ref 19.6–45.3)
MCH RBC QN AUTO: 20.6 PG (ref 26.6–33)
MCHC RBC AUTO-ENTMCNC: 28.3 G/DL (ref 31.5–35.7)
MCV RBC AUTO: 72.9 FL (ref 79–97)
MONOCYTES # BLD AUTO: 0.3 10*3/MM3 (ref 0.1–0.9)
MONOCYTES NFR BLD AUTO: 14.8 % (ref 5–12)
NEUTROPHILS NFR BLD AUTO: 1.03 10*3/MM3 (ref 1.7–7)
NEUTROPHILS NFR BLD AUTO: 50.7 % (ref 42.7–76)
NRBC BLD AUTO-RTO: 0 /100 WBC (ref 0–0.2)
PLATELET # BLD AUTO: 99 10*3/MM3 (ref 140–450)
POTASSIUM SERPL-SCNC: 4.1 MMOL/L (ref 3.5–5.2)
PROT SERPL-MCNC: 6.6 G/DL (ref 6–8.5)
RBC # BLD AUTO: 4.9 10*6/MM3 (ref 3.77–5.28)
SODIUM SERPL-SCNC: 140 MMOL/L (ref 136–145)
WBC NRBC COR # BLD: 2.03 10*3/MM3 (ref 3.4–10.8)

## 2022-11-16 PROCEDURE — 85025 COMPLETE CBC W/AUTO DIFF WBC: CPT | Performed by: INTERNAL MEDICINE

## 2022-11-16 PROCEDURE — 84703 CHORIONIC GONADOTROPIN ASSAY: CPT | Performed by: INTERNAL MEDICINE

## 2022-11-16 PROCEDURE — 80053 COMPREHEN METABOLIC PANEL: CPT | Performed by: INTERNAL MEDICINE

## 2022-11-16 NOTE — NURSING NOTE
Attempted 5 times to call physician to notify of abnormal labs and on 5th call the physic an said not to treat today and reschedule next week and re check labs. Patient given instructions and verbalized understanding. D/C home ambulatory..

## 2022-11-18 ENCOUNTER — TELEPHONE (OUTPATIENT)
Dept: FAMILY MEDICINE CLINIC | Facility: CLINIC | Age: 32
End: 2022-11-18

## 2022-11-18 NOTE — TELEPHONE ENCOUNTER
Caller: HerreraDee    Relationship: Self    Best call back number:   [phone]      What medication are you requesting:DR BOLANOS'S SUGGESTION    What are your current symptoms: FACE SWOLLEN AROUND EYES, CHEEKS     How long have you been experiencing symptoms: 3 DAYS     Have you had these symptoms before:    [x] Yes  [] No    Have you been treated for these symptoms before:   [x] Yes  [] No    If a prescription is needed, what is your preferred pharmacy and phone number:      [facility]/pharmacy #[id number] - [place], [place] - [facility] [address] - [phone] [facility] [phone]   [phone]        Additional notes:

## 2022-11-18 NOTE — TELEPHONE ENCOUNTER
Patient may be reacting to medication.  Strongly suggest go to immediate care center or emergency room

## 2022-11-23 ENCOUNTER — APPOINTMENT (OUTPATIENT)
Dept: ONCOLOGY | Facility: HOSPITAL | Age: 32
End: 2022-11-23

## 2022-11-30 ENCOUNTER — APPOINTMENT (OUTPATIENT)
Dept: ONCOLOGY | Facility: HOSPITAL | Age: 32
End: 2022-11-30

## 2022-12-08 ENCOUNTER — OFFICE VISIT (OUTPATIENT)
Dept: FAMILY MEDICINE CLINIC | Facility: CLINIC | Age: 32
End: 2022-12-08

## 2022-12-08 VITALS
SYSTOLIC BLOOD PRESSURE: 146 MMHG | OXYGEN SATURATION: 90 % | DIASTOLIC BLOOD PRESSURE: 80 MMHG | BODY MASS INDEX: 22.14 KG/M2 | TEMPERATURE: 97.5 F | WEIGHT: 137.8 LBS | HEART RATE: 66 BPM | HEIGHT: 66 IN

## 2022-12-08 DIAGNOSIS — N28.89 HYPERTENSION SECONDARY TO OTHER RENAL DISORDERS: Primary | ICD-10-CM

## 2022-12-08 DIAGNOSIS — D50.9 IRON DEFICIENCY ANEMIA, UNSPECIFIED IRON DEFICIENCY ANEMIA TYPE: ICD-10-CM

## 2022-12-08 DIAGNOSIS — I50.40 COMBINED SYSTOLIC AND DIASTOLIC CONGESTIVE HEART FAILURE, UNSPECIFIED HF CHRONICITY: ICD-10-CM

## 2022-12-08 DIAGNOSIS — I15.1 HYPERTENSION SECONDARY TO OTHER RENAL DISORDERS: Primary | ICD-10-CM

## 2022-12-08 DIAGNOSIS — M32.9 SYSTEMIC LUPUS ERYTHEMATOSUS, UNSPECIFIED SLE TYPE, UNSPECIFIED ORGAN INVOLVEMENT STATUS: ICD-10-CM

## 2022-12-08 DIAGNOSIS — N18.5 STAGE 5 CHRONIC KIDNEY DISEASE: ICD-10-CM

## 2022-12-08 PROCEDURE — 99214 OFFICE O/P EST MOD 30 MIN: CPT | Performed by: INTERNAL MEDICINE

## 2022-12-08 RX ORDER — MYCOPHENOLATE MOFETIL 500 MG/1
1000 TABLET ORAL
COMMUNITY
Start: 2022-11-22 | End: 2023-02-18 | Stop reason: HOSPADM

## 2022-12-08 RX ORDER — HYDROXYCHLOROQUINE SULFATE 200 MG/1
200 TABLET, FILM COATED ORAL DAILY
COMMUNITY
Start: 2022-11-22

## 2022-12-08 RX ORDER — ONDANSETRON 4 MG/1
4 TABLET, ORALLY DISINTEGRATING ORAL
COMMUNITY
Start: 2022-11-21 | End: 2023-03-24

## 2022-12-08 RX ORDER — LACOSAMIDE 50 MG/1
50 TABLET ORAL
COMMUNITY
Start: 2022-08-29 | End: 2023-02-18 | Stop reason: HOSPADM

## 2022-12-08 RX ORDER — CARVEDILOL 6.25 MG/1
6.25 TABLET ORAL 2 TIMES DAILY WITH MEALS
Qty: 60 TABLET | Refills: 3 | Status: SHIPPED | OUTPATIENT
Start: 2022-12-08 | End: 2023-02-01 | Stop reason: DRUGHIGH

## 2022-12-08 RX ORDER — NIFEDIPINE 60 MG/1
60 TABLET, EXTENDED RELEASE ORAL
COMMUNITY
Start: 2022-11-22 | End: 2023-02-18 | Stop reason: HOSPADM

## 2022-12-08 NOTE — PROGRESS NOTES
"Chief Complaint  f/u hosp    Subjective        Dee Herrera presents to [facility]  History of Present Illness patient was seen for hospital follow-up from [facility].  There are no medical records in the chart.  We are having to call [facility] and get the records we need.  Patient's blood pressure at home has been running 160s over 80s.  Patient had her carvedilol increased to 6.25 mg twice daily.  Patient states that this dose the blood pressure was better controlled.  Patient will continue amlodipine 10 mg daily, minoxidil 10 mg daily, nifedipine 60 mg daily.  Patient does have a diagnosis of lupus and is using prednisone 20 mg daily, CellCept 2000 mg daily, Plaquenil  200 mg daily.  Patient does have a nephrologist and is being treated with them.  Patient is also going renal dialysis 3 times a week.  Patient does have a history of congestive heart failure and chest x-ray indicates cardiomegaly.  Patient did have an S3 but lungs sounded clear.  Patient is getting dialysis 3 times a week and drawing off fluid.  Patient is monitoring blood pressure pulse and weight and will follow-up in 2 weeks.  Patient does have history of iron deficiency and ferritin levels are being drawn.  Patient is having labs drawn today we will follow-up.    Dictated utilizing Dragon dictation. If there are questions or for further clarification, please contact me.    Objective   Vital Signs:  Blood Pressure 146/80   Pulse 66   Temperature 97.5 °F (36.4 °C)   Height 166.4 cm (65.5\")   Weight 62.5 kg (137 lb 12.8 oz)   Oxygen Saturation 90%   Body Mass Index 22.58 kg/m²   Estimated body mass index is 22.58 kg/m² as calculated from the following:    Height as of this encounter: 166.4 cm (65.5\").    Weight as of this encounter: 62.5 kg (137 lb 12.8 oz).    BMI is within normal parameters. No other follow-up for BMI required.      Physical Exam  Vitals and nursing note reviewed.   Constitutional:       " Appearance: Normal appearance. She is well-developed.   HENT:      Head: Normocephalic and atraumatic.      Nose: Nose normal.      Mouth/Throat:      Mouth: Mucous membranes are moist.      Pharynx: Oropharynx is clear.   Eyes:      Extraocular Movements: Extraocular movements intact.      Conjunctiva/sclera: Conjunctivae normal.      Pupils: Pupils are equal, round, and reactive to light.   Cardiovascular:      Rate and Rhythm: Normal rate and regular rhythm.      Heart sounds: No murmur heard.    No friction rub. Gallop present.   Pulmonary:      Effort: Pulmonary effort is normal. No respiratory distress.      Breath sounds: Normal breath sounds. No stridor. No wheezing, rhonchi or rales.   Chest:      Chest wall: No tenderness.   Abdominal:      General: Bowel sounds are normal.      Palpations: Abdomen is soft.   Musculoskeletal:         General: Normal range of motion.      Cervical back: Normal range of motion and neck supple.      Right lower leg: No edema.      Left lower leg: No edema.   Skin:     General: Skin is warm and dry.   Neurological:      General: No focal deficit present.      Mental Status: She is alert and oriented to person, place, and time. Mental status is at baseline.   Psychiatric:         Mood and Affect: Mood normal.         Behavior: Behavior normal.         Thought Content: Thought content normal.         Judgment: Judgment normal.        Result Review :{Labs  Result Review  Imaging  Med Tab  Media  Procedures  :23}                Assessment and Plan  #1 old records [facility] #2 labs #3 monitor blood pressure pulse and weight #4 increase carvedilol to 6.25 mg twice daily #5 follow-up in 2 weeks  Diagnoses and all orders for this visit:    1. Hypertension secondary to other renal disorders (Primary)  -     CBC (No Diff)  -     Comprehensive Metabolic Panel  -     Lipid Panel  -     Vitamin D,25-Hydroxy  -     proBNP    2. Stage 5 chronic kidney disease (HCC)  -     CBC (No Diff)  -      Comprehensive Metabolic Panel  -     Lipid Panel  -     Vitamin D,25-Hydroxy    3. Systemic lupus erythematosus, unspecified SLE type, unspecified organ involvement status (HCC)  -     CBC (No Diff)  -     Comprehensive Metabolic Panel  -     Lipid Panel  -     Vitamin D,25-Hydroxy    4. Iron deficiency anemia, unspecified iron deficiency anemia type  -     CBC (No Diff)  -     Comprehensive Metabolic Panel  -     Lipid Panel  -     Vitamin D,25-Hydroxy  -     Ferritin    5. Combined systolic and diastolic congestive heart failure, unspecified HF chronicity (HCC)    Other orders  -     carvedilol (Coreg) 6.25 MG tablet; Take 1 tablet by mouth 2 (Two) Times a Day With Meals.  Dispense: 60 tablet; Refill: 3             Follow Up   Return in about 2 weeks (around 12/22/2022), or if symptoms worsen or fail to improve, for Recheck.  Patient was given instructions and counseling regarding her condition or for health maintenance advice. Please see specific information pulled into the AVS if appropriate.        Wounds

## 2022-12-10 LAB
25(OH)D3+25(OH)D2 SERPL-MCNC: 23.8 NG/ML (ref 30–100)
ALBUMIN SERPL-MCNC: 3.4 G/DL (ref 3.9–5)
ALBUMIN/GLOB SERPL: 1.7 {RATIO} (ref 1.2–2.2)
ALP SERPL-CCNC: 49 IU/L (ref 44–121)
ALT SERPL-CCNC: 10 IU/L (ref 0–32)
AST SERPL-CCNC: 22 IU/L (ref 0–40)
BILIRUB SERPL-MCNC: 0.5 MG/DL (ref 0–1.2)
BUN SERPL-MCNC: 14 MG/DL (ref 6–20)
BUN/CREAT SERPL: 3 (ref 9–23)
CALCIUM SERPL-MCNC: 8.6 MG/DL (ref 8.7–10.2)
CHLORIDE SERPL-SCNC: 95 MMOL/L (ref 96–106)
CHOLEST SERPL-MCNC: 122 MG/DL (ref 100–199)
CO2 SERPL-SCNC: 28 MMOL/L (ref 20–29)
CREAT SERPL-MCNC: 5.36 MG/DL (ref 0.57–1)
EGFRCR SERPLBLD CKD-EPI 2021: 10 ML/MIN/1.73
ERYTHROCYTE [DISTWIDTH] IN BLOOD BY AUTOMATED COUNT: 22.1 % (ref 11.7–15.4)
GLOBULIN SER CALC-MCNC: 2 G/DL (ref 1.5–4.5)
GLUCOSE SERPL-MCNC: 81 MG/DL (ref 70–99)
HCT VFR BLD AUTO: 32.1 % (ref 34–46.6)
HDLC SERPL-MCNC: 67 MG/DL
HGB BLD-MCNC: 9.6 G/DL (ref 11.1–15.9)
LDLC SERPL CALC-MCNC: 44 MG/DL (ref 0–99)
MCH RBC QN AUTO: 21.6 PG (ref 26.6–33)
MCHC RBC AUTO-ENTMCNC: 29.9 G/DL (ref 31.5–35.7)
MCV RBC AUTO: 72 FL (ref 79–97)
MORPHOLOGY BLD-IMP: ABNORMAL
PLATELET # BLD AUTO: 59 X10E3/UL (ref 150–450)
POTASSIUM SERPL-SCNC: 3.6 MMOL/L (ref 3.5–5.2)
PROT SERPL-MCNC: 5.4 G/DL (ref 6–8.5)
RBC # BLD AUTO: 4.44 X10E6/UL (ref 3.77–5.28)
SODIUM SERPL-SCNC: 137 MMOL/L (ref 134–144)
TRIGL SERPL-MCNC: 48 MG/DL (ref 0–149)
VLDLC SERPL CALC-MCNC: 11 MG/DL (ref 5–40)
WBC # BLD AUTO: 2.3 X10E3/UL (ref 3.4–10.8)

## 2022-12-12 ENCOUNTER — TELEPHONE (OUTPATIENT)
Dept: OBSTETRICS AND GYNECOLOGY | Age: 32
End: 2022-12-12

## 2022-12-12 NOTE — TELEPHONE ENCOUNTER
Provider: DR MATILDE INIGUEZ  Caller: LUZ ELENA TRINIDAD  Relationship to Patient: SELF  Phone Number: [phone]  Reason for Call: SAME DAY CANCELLATION//WENT TO WRONG LOCATION//RESCHEDULED FOR FIRST AVAL WITH APRN

## 2022-12-19 ENCOUNTER — TELEPHONE (OUTPATIENT)
Dept: OBSTETRICS AND GYNECOLOGY | Age: 32
End: 2022-12-19

## 2022-12-19 NOTE — TELEPHONE ENCOUNTER
Caller: Dee Herrera    Relationship to patient: Self    Best call back number: [phone]    Patient is needing: PT CANCELLED SAME DAY APPT AT 11:30 WITH HAMMAD MAYNARD. PT RESCHEDULED FOR 1/6/23

## 2023-01-11 ENCOUNTER — OFFICE VISIT (OUTPATIENT)
Dept: OBSTETRICS AND GYNECOLOGY | Age: 33
End: 2023-01-11
Payer: COMMERCIAL

## 2023-01-11 VITALS
BODY MASS INDEX: 18.87 KG/M2 | HEIGHT: 66 IN | WEIGHT: 117.4 LBS | DIASTOLIC BLOOD PRESSURE: 90 MMHG | SYSTOLIC BLOOD PRESSURE: 130 MMHG

## 2023-01-11 DIAGNOSIS — N90.89 SWELLING OF VULVA: Primary | ICD-10-CM

## 2023-01-11 PROCEDURE — 99213 OFFICE O/P EST LOW 20 MIN: CPT | Performed by: NURSE PRACTITIONER

## 2023-01-11 NOTE — PROGRESS NOTES
"Subjective   Dee Herrera is a 32 y.o. female is being seen today for   Chief Complaint   Patient presents with   • Follow-up     GYN F/U for Vaginal swelling, Pt states symptoms have been present for x3 weeks and progressivly gotten worse, pt states she thinks this may due to her Lupus   .    History of Present Illness     Patient here for vaginal culture  Has had some vaginal swelling off and on for the past few weeks and wants to rule out any infections  States she has had a rough year health wise- diagnosed with Lupus and kidney failure  She will be put on a kidney transplant list  She has had swelling throughout her whole body so she assumes the swelling is related to other issues but wanted to rule out infections as well  Denies any itching, discharge or odor  No vaginal lesions    The following portions of the patient's history were reviewed and updated as appropriate: allergies, current medications, past family history, past medical history, past social history, past surgical history and problem list.    /90   Ht 166.4 cm (65.5\")   Wt 53.3 kg (117 lb 6.4 oz)   LMP  (LMP Unknown)   BMI 19.24 kg/m²         Review of Systems   Cardiovascular: Positive for leg swelling.   Genitourinary:        Vaginal swelling   Musculoskeletal: Positive for arthralgias.       Objective   Physical Exam  Constitutional:       Appearance: She is well-developed.   Genitourinary:     Vagina: Normal.      Cervix: No cervical motion tenderness, discharge or friability.      Uterus: Not tender.       Comments: There is no visible swelling on exam today, no vaginal lesions or discharge  Skin:     General: Skin is warm and dry.   Neurological:      Mental Status: She is alert and oriented to person, place, and time.           Assessment & Plan   Diagnoses and all orders for this visit:    1. Swelling of vulva (Primary)        Exam today is normal.  There is no visible swelling.  Patient states it has improved.  I assume it is " related to her other health issues rather than an infection or gynecological concern.  Vaginal culture sent and will call with results.Advised to wear a tight fitting underwear to potentially help with the edema.          Total time spent today with Dee  was 20-29 minutes (level 3).  Greater than 50% of the time was spent coordinating care, answering her questions and counseling regarding pathophysiology of her presenting problem along with plans for any diagnositc work-up and treatment.

## 2023-01-13 LAB
A VAGINAE DNA VAG QL NAA+PROBE: ABNORMAL SCORE
BVAB2 DNA VAG QL NAA+PROBE: ABNORMAL SCORE
C ALBICANS DNA VAG QL NAA+PROBE: NEGATIVE
C GLABRATA DNA VAG QL NAA+PROBE: NEGATIVE
C TRACH DNA VAG QL NAA+PROBE: NEGATIVE
MEGA1 DNA VAG QL NAA+PROBE: ABNORMAL SCORE
N GONORRHOEA DNA VAG QL NAA+PROBE: NEGATIVE
T VAGINALIS DNA VAG QL NAA+PROBE: NEGATIVE

## 2023-01-16 RX ORDER — METRONIDAZOLE 500 MG/1
500 TABLET ORAL 2 TIMES DAILY
Qty: 14 TABLET | Refills: 0 | Status: SHIPPED | OUTPATIENT
Start: 2023-01-16 | End: 2023-01-23

## 2023-02-01 ENCOUNTER — OFFICE VISIT (OUTPATIENT)
Dept: FAMILY MEDICINE CLINIC | Facility: CLINIC | Age: 33
End: 2023-02-01
Payer: COMMERCIAL

## 2023-02-01 VITALS
SYSTOLIC BLOOD PRESSURE: 156 MMHG | TEMPERATURE: 98.4 F | DIASTOLIC BLOOD PRESSURE: 88 MMHG | HEART RATE: 78 BPM | BODY MASS INDEX: 18.84 KG/M2 | WEIGHT: 117.2 LBS | OXYGEN SATURATION: 95 % | HEIGHT: 66 IN

## 2023-02-01 DIAGNOSIS — N28.89 HYPERTENSION SECONDARY TO OTHER RENAL DISORDERS: ICD-10-CM

## 2023-02-01 DIAGNOSIS — R11.2 NAUSEA AND VOMITING, UNSPECIFIED VOMITING TYPE: Primary | ICD-10-CM

## 2023-02-01 DIAGNOSIS — R10.9 ABDOMINAL PAIN, UNSPECIFIED ABDOMINAL LOCATION: ICD-10-CM

## 2023-02-01 DIAGNOSIS — M32.9 SYSTEMIC LUPUS ERYTHEMATOSUS, UNSPECIFIED SLE TYPE, UNSPECIFIED ORGAN INVOLVEMENT STATUS: ICD-10-CM

## 2023-02-01 DIAGNOSIS — I15.1 HYPERTENSION SECONDARY TO OTHER RENAL DISORDERS: ICD-10-CM

## 2023-02-01 DIAGNOSIS — K85.00 IDIOPATHIC ACUTE PANCREATITIS WITHOUT INFECTION OR NECROSIS: ICD-10-CM

## 2023-02-01 PROBLEM — K29.80 DUODENITIS: Status: ACTIVE | Noted: 2023-02-01

## 2023-02-01 PROBLEM — I31.39 PERICARDIAL EFFUSION: Status: ACTIVE | Noted: 2023-02-01

## 2023-02-01 PROBLEM — K76.1 CARDIAC CIRRHOSIS: Status: ACTIVE | Noted: 2023-02-01

## 2023-02-01 PROBLEM — K50.00 REGIONAL ENTERITIS OF SMALL BOWEL (HCC): Status: ACTIVE | Noted: 2023-02-01

## 2023-02-01 PROBLEM — K85.90 PANCREATITIS: Status: ACTIVE | Noted: 2023-02-01

## 2023-02-01 PROCEDURE — 99214 OFFICE O/P EST MOD 30 MIN: CPT | Performed by: INTERNAL MEDICINE

## 2023-02-01 RX ORDER — CARVEDILOL 25 MG/1
25 TABLET ORAL 2 TIMES DAILY WITH MEALS
Qty: 60 TABLET | Refills: 3 | Status: SHIPPED | OUTPATIENT
Start: 2023-02-01 | End: 2023-02-02 | Stop reason: SDUPTHER

## 2023-02-01 RX ORDER — OMEPRAZOLE 40 MG/1
40 CAPSULE, DELAYED RELEASE ORAL DAILY
Qty: 90 CAPSULE | Refills: 1 | Status: SHIPPED | OUTPATIENT
Start: 2023-02-01 | End: 2023-02-02 | Stop reason: SDUPTHER

## 2023-02-01 NOTE — PROGRESS NOTES
"Chief Complaint  f/u HTN and needs gi refferall-stomach pain constipation every other we    Subjective        Dee Herrera presents to [facility]  History of Present Illness patient was seen for nausea and vomiting.  Patient has severe abdominal pain especially after eating.  Patient is being referred to gastroenterology for evaluation.  Patient did have a CT scan of the abdomen pelvis that showed acute pancreatitis, duodenitis, small bowel enteritis.  Patient is being referred for evaluation.  Patient was also put on omeprazole 40 mg daily.  Patient also has a history of lupus and is being treated by rheumatology.  Patient also has uncontrolled blood pressure and she was seen by nephrology and increased her carvedilol to 25 mg twice daily.  Patient will continue nifedipine 60 mg daily, minoxidil 10 mg daily.  Patient is also taking 20 mg daily of prednisone for lupus.  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  Patient is being referred to cardiology and have a 2D echo.    Dictated utilizing Dragon dictation. If there are questions or for further clarification, please contact me.    Objective   Vital Signs:  Blood Pressure 156/88   Pulse 78   Temperature 98.4 °F (36.9 °C)   Height 166.4 cm (65.5\")   Weight 53.2 kg (117 lb 3.2 oz)   Oxygen Saturation 95%   Body Mass Index 19.21 kg/m²   Estimated body mass index is 19.21 kg/m² as calculated from the following:    Height as of this encounter: 166.4 cm (65.5\").    Weight as of this encounter: 53.2 kg (117 lb 3.2 oz).       BMI is within normal parameters. No other follow-up for BMI required.      Physical Exam  Vitals and nursing note reviewed.   Constitutional:       Appearance: Normal appearance. She is well-developed.   HENT:      Head: Normocephalic and atraumatic.      Nose: Nose normal.      Mouth/Throat:      Mouth: Mucous membranes are moist.      Pharynx: Oropharynx is clear.   Eyes:      " Extraocular Movements: Extraocular movements intact.      Conjunctiva/sclera: Conjunctivae normal.      Pupils: Pupils are equal, round, and reactive to light.   Cardiovascular:      Rate and Rhythm: Normal rate and regular rhythm.      Heart sounds: Normal heart sounds. No murmur heard.    No friction rub. No gallop.   Pulmonary:      Effort: Pulmonary effort is normal. No respiratory distress.      Breath sounds: Normal breath sounds. No stridor. No wheezing, rhonchi or rales.   Chest:      Chest wall: No tenderness.   Abdominal:      General: Bowel sounds are normal.      Palpations: Abdomen is soft.      Tenderness: There is abdominal tenderness.   Musculoskeletal:         General: Normal range of motion.      Cervical back: Normal range of motion and neck supple.   Skin:     General: Skin is warm and dry.   Neurological:      General: No focal deficit present.      Mental Status: She is alert and oriented to person, place, and time. Mental status is at baseline.   Psychiatric:         Mood and Affect: Mood normal.         Behavior: Behavior normal.         Thought Content: Thought content normal.         Judgment: Judgment normal.        Result Review :                   Assessment and Plan  1 cardiology and GI referral #2 CMP, CBC, amylase #3 duplex mesenteric artery scan number four 2D echo  Diagnoses and all orders for this visit:    1. Nausea and vomiting, unspecified vomiting type (Primary)  -     Duplex Mesenteric Complete CAR; Future  -     Ambulatory Referral to Gastroenterology  -     Amylase; Future  -     CBC & Differential; Future  -     Comprehensive Metabolic Panel; Future  -     Adult Transthoracic Echo Complete W/ Cont if Necessary Per Protocol; Future  -     Ambulatory Referral to Cardiology  -     Ambulatory Referral to Gastroenterology    2. Hypertension secondary to other renal disorders  -     Cancel: Comprehensive Metabolic Panel; Future  -     Cancel: Amylase  -     Cancel: CBC &  Differential  -     Adult Transthoracic Echo Complete W/ Cont if Necessary Per Protocol; Future  -     Ambulatory Referral to Cardiology  -     Ambulatory Referral to Gastroenterology    3. Systemic lupus erythematosus, unspecified SLE type, unspecified organ involvement status (HCC)  -     Cancel: Comprehensive Metabolic Panel; Future  -     Cancel: Amylase  -     Cancel: CBC & Differential  -     Duplex Mesenteric Complete CAR; Future  -     Ambulatory Referral to Gastroenterology  -     Amylase; Future  -     CBC & Differential; Future  -     Comprehensive Metabolic Panel; Future  -     Adult Transthoracic Echo Complete W/ Cont if Necessary Per Protocol; Future  -     Ambulatory Referral to Cardiology  -     Ambulatory Referral to Gastroenterology    4. Idiopathic acute pancreatitis without infection or necrosis  -     Cancel: Comprehensive Metabolic Panel; Future  -     Cancel: Amylase  -     Cancel: CBC & Differential  -     Ambulatory Referral to Gastroenterology  -     Amylase; Future  -     CBC & Differential; Future  -     Comprehensive Metabolic Panel; Future  -     Adult Transthoracic Echo Complete W/ Cont if Necessary Per Protocol; Future  -     Ambulatory Referral to Cardiology  -     Ambulatory Referral to Gastroenterology    5. Abdominal pain, unspecified abdominal location    Other orders  -     carvedilol (Coreg) 25 MG tablet; Take 1 tablet by mouth 2 (Two) Times a Day With Meals.  Dispense: 60 tablet; Refill: 3  -     omeprazole (priLOSEC) 40 MG capsule; Take 1 capsule by mouth Daily.  Dispense: 90 capsule; Refill: 1             Follow Up   Return in about 1 week (around 2/8/2023), or if symptoms worsen or fail to improve, for Recheck.  Patient was given instructions and counseling regarding her condition or for health maintenance advice. Please see specific information pulled into the AVS if appropriate.

## 2023-02-02 ENCOUNTER — TELEPHONE (OUTPATIENT)
Dept: CARDIOLOGY | Facility: CLINIC | Age: 33
End: 2023-02-02
Payer: MEDICAID

## 2023-02-02 RX ORDER — CARVEDILOL 25 MG/1
25 TABLET ORAL 2 TIMES DAILY WITH MEALS
Qty: 60 TABLET | Refills: 3 | Status: SHIPPED | OUTPATIENT
Start: 2023-02-02 | End: 2023-02-18 | Stop reason: HOSPADM

## 2023-02-02 RX ORDER — OMEPRAZOLE 40 MG/1
40 CAPSULE, DELAYED RELEASE ORAL DAILY
Qty: 90 CAPSULE | Refills: 1 | Status: SHIPPED | OUTPATIENT
Start: 2023-02-02 | End: 2023-02-18 | Stop reason: HOSPADM

## 2023-02-02 NOTE — TELEPHONE ENCOUNTER
Dr. Corey called about this patient this AM. He was requesting the patient to be seen by Dr. 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 The patient has never been seen in our office before. I spoke to Dr. Root about this patient and she said she could see the patient today. However, the patient has dialysis today and can not reschedule since it could mess up her chances of getting a kidney transplant. Dr. Root said that the patient then needs to be seen by any other provider tomorrow since Dr. Root is out of the office tomorrow and Monday. I have scheduled her to see Dr. Taylor tomorrow.     Mary Hobson RN  Triage [facility] [place]

## 2023-02-02 NOTE — TELEPHONE ENCOUNTER
Caller: Dee Herrera    Relationship: Self    Best call back number:[phone]      Requested Prescriptions:   Requested Prescriptions     Pending Prescriptions Disp Refills   • carvedilol (Coreg) 25 MG tablet 60 tablet 3     Sig: Take 1 tablet by mouth 2 (Two) Times a Day With Meals.   • omeprazole (priLOSEC) 40 MG capsule 90 capsule 1     Sig: Take 1 capsule by mouth Daily.        Pharmacy where request should be sent: [id number] [address] AT [facility] [phone] [facility] [phone] FX     Does the patient have less than a 3 day supply:  [x] Yes  [] No    Would you like a call back once the refill request has been completed: [x] Yes [] No    If the office needs to give you a call back, can they leave a voicemail: [x] Yes [] No    Ara Billings Rep   02/02/23 13:31 EST

## 2023-02-03 ENCOUNTER — HOSPITAL ENCOUNTER (OUTPATIENT)
Dept: CARDIOLOGY | Facility: HOSPITAL | Age: 33
Discharge: HOME | End: 2023-02-03
Admitting: INTERNAL MEDICINE
Payer: MEDICAID

## 2023-02-03 ENCOUNTER — OFFICE VISIT (OUTPATIENT)
Dept: CARDIOLOGY | Facility: CLINIC | Age: 33
End: 2023-02-03
Payer: MEDICAID

## 2023-02-03 VITALS
HEART RATE: 80 BPM | SYSTOLIC BLOOD PRESSURE: 124 MMHG | HEIGHT: 66 IN | DIASTOLIC BLOOD PRESSURE: 80 MMHG | OXYGEN SATURATION: 97 % | WEIGHT: 115.8 LBS | BODY MASS INDEX: 18.61 KG/M2

## 2023-02-03 VITALS
DIASTOLIC BLOOD PRESSURE: 90 MMHG | BODY MASS INDEX: 18.8 KG/M2 | WEIGHT: 117 LBS | SYSTOLIC BLOOD PRESSURE: 130 MMHG | HEART RATE: 66 BPM | HEIGHT: 66 IN

## 2023-02-03 DIAGNOSIS — N28.89 HYPERTENSION SECONDARY TO OTHER RENAL DISORDERS: ICD-10-CM

## 2023-02-03 DIAGNOSIS — N18.6 ESRD (END STAGE RENAL DISEASE): ICD-10-CM

## 2023-02-03 DIAGNOSIS — K74.60 CIRRHOSIS OF LIVER WITHOUT ASCITES, UNSPECIFIED HEPATIC CIRRHOSIS TYPE: ICD-10-CM

## 2023-02-03 DIAGNOSIS — M32.9 SYSTEMIC LUPUS ERYTHEMATOSUS, UNSPECIFIED SLE TYPE, UNSPECIFIED ORGAN INVOLVEMENT STATUS: ICD-10-CM

## 2023-02-03 DIAGNOSIS — I31.39 PERICARDIAL EFFUSION: ICD-10-CM

## 2023-02-03 DIAGNOSIS — I15.1 HYPERTENSION SECONDARY TO OTHER RENAL DISORDERS: ICD-10-CM

## 2023-02-03 DIAGNOSIS — I51.7 LEFT VENTRICULAR HYPERTROPHY: Primary | ICD-10-CM

## 2023-02-03 DIAGNOSIS — R11.2 NAUSEA AND VOMITING, UNSPECIFIED VOMITING TYPE: ICD-10-CM

## 2023-02-03 DIAGNOSIS — K85.00 IDIOPATHIC ACUTE PANCREATITIS WITHOUT INFECTION OR NECROSIS: ICD-10-CM

## 2023-02-03 DIAGNOSIS — N18.5 STAGE 5 CHRONIC KIDNEY DISEASE: ICD-10-CM

## 2023-02-03 DIAGNOSIS — I10 ESSENTIAL HYPERTENSION: ICD-10-CM

## 2023-02-03 PROBLEM — E85.9 AMYLOID DISEASE: Status: ACTIVE | Noted: 2023-02-03

## 2023-02-03 PROBLEM — I50.40 COMBINED SYSTOLIC AND DIASTOLIC CONGESTIVE HEART FAILURE: Status: RESOLVED | Noted: 2022-12-08 | Resolved: 2023-02-03

## 2023-02-03 LAB
AORTIC ARCH: 2.7 CM
BH CV ECHO LEFT VENTRICLE GLOBAL LONGITUDINAL STRAIN: -15 %
BH CV ECHO MEAS - ACS: 1.73 CM
BH CV ECHO MEAS - AO MAX PG: 8.8 MMHG
BH CV ECHO MEAS - AO MEAN PG: 5.9 MMHG
BH CV ECHO MEAS - AO ROOT DIAM: 3.2 CM
BH CV ECHO MEAS - AO V2 MAX: 148.7 CM/SEC
BH CV ECHO MEAS - AO V2 VTI: 28.8 CM
BH CV ECHO MEAS - AVA(I,D): 3.1 CM2
BH CV ECHO MEAS - EDV(CUBED): 120 ML
BH CV ECHO MEAS - EDV(MOD-SP2): 108 ML
BH CV ECHO MEAS - EDV(MOD-SP4): 99 ML
BH CV ECHO MEAS - EF(MOD-BP): 63.6 %
BH CV ECHO MEAS - EF(MOD-SP2): 63 %
BH CV ECHO MEAS - EF(MOD-SP4): 67.7 %
BH CV ECHO MEAS - ESV(CUBED): 34.1 ML
BH CV ECHO MEAS - ESV(MOD-SP2): 40 ML
BH CV ECHO MEAS - ESV(MOD-SP4): 32 ML
BH CV ECHO MEAS - FS: 34.2 %
BH CV ECHO MEAS - IVS/LVPW: 1.08 CM
BH CV ECHO MEAS - IVSD: 1.76 CM
BH CV ECHO MEAS - LAT PEAK E' VEL: 6 CM/SEC
BH CV ECHO MEAS - LV MASS(C)D: 381.3 GRAMS
BH CV ECHO MEAS - LV MAX PG: 5.7 MMHG
BH CV ECHO MEAS - LV MEAN PG: 3.5 MMHG
BH CV ECHO MEAS - LV V1 MAX: 119.1 CM/SEC
BH CV ECHO MEAS - LV V1 VTI: 21.6 CM
BH CV ECHO MEAS - LVIDD: 4.9 CM
BH CV ECHO MEAS - LVIDS: 3.2 CM
BH CV ECHO MEAS - LVOT AREA: 4.1 CM2
BH CV ECHO MEAS - LVOT DIAM: 2.29 CM
BH CV ECHO MEAS - LVPWD: 1.64 CM
BH CV ECHO MEAS - MED PEAK E' VEL: 6.9 CM/SEC
BH CV ECHO MEAS - MV A DUR: 0.15 SEC
BH CV ECHO MEAS - MV A MAX VEL: 94.7 CM/SEC
BH CV ECHO MEAS - MV DEC TIME: 0.14 MSEC
BH CV ECHO MEAS - MV E MAX VEL: 70.3 CM/SEC
BH CV ECHO MEAS - MV E/A: 0.74
BH CV ECHO MEAS - PA ACC TIME: 0.17 SEC
BH CV ECHO MEAS - PA PR(ACCEL): 4.1 MMHG
BH CV ECHO MEAS - PA V2 MAX: 133.7 CM/SEC
BH CV ECHO MEAS - PULM A REVS DUR: 0.14 SEC
BH CV ECHO MEAS - PULM A REVS VEL: 26.6 CM/SEC
BH CV ECHO MEAS - PULM DIAS VEL: 37.4 CM/SEC
BH CV ECHO MEAS - PULM S/D: 1.68
BH CV ECHO MEAS - PULM SYS VEL: 62.6 CM/SEC
BH CV ECHO MEAS - QP/QS: 0.87
BH CV ECHO MEAS - RV MAX PG: 4.2 MMHG
BH CV ECHO MEAS - RV V1 MAX: 102 CM/SEC
BH CV ECHO MEAS - RV V1 VTI: 18.6 CM
BH CV ECHO MEAS - RVOT DIAM: 2.31 CM
BH CV ECHO MEAS - SV(LVOT): 89 ML
BH CV ECHO MEAS - SV(MOD-SP2): 68 ML
BH CV ECHO MEAS - SV(MOD-SP4): 67 ML
BH CV ECHO MEAS - SV(RVOT): 77.8 ML
BH CV ECHO MEAS - TAPSE (>1.6): 2.33 CM
BH CV ECHO MEASUREMENTS AVERAGE E/E' RATIO: 10.9
BH CV XLRA - RV BASE: 3 CM
BH CV XLRA - RV LENGTH: 10.1 CM
BH CV XLRA - RV MID: 2.33 CM
BH CV XLRA - TDI S': 15.8 CM/SEC
LEFT ATRIUM VOLUME INDEX: 29.4 ML/M2
MAXIMAL PREDICTED HEART RATE: 190 BPM
SINUS: 3.1 CM
STJ: 2.5 CM
STRESS TARGET HR: 162 BPM

## 2023-02-03 PROCEDURE — 93306 TTE W/DOPPLER COMPLETE: CPT

## 2023-02-03 PROCEDURE — 99214 OFFICE O/P EST MOD 30 MIN: CPT | Performed by: INTERNAL MEDICINE

## 2023-02-03 PROCEDURE — 93000 ELECTROCARDIOGRAM COMPLETE: CPT | Performed by: INTERNAL MEDICINE

## 2023-02-03 PROCEDURE — 93306 TTE W/DOPPLER COMPLETE: CPT | Performed by: INTERNAL MEDICINE

## 2023-02-03 PROCEDURE — 93356 MYOCRD STRAIN IMG SPCKL TRCK: CPT

## 2023-02-03 PROCEDURE — 93356 MYOCRD STRAIN IMG SPCKL TRCK: CPT | Performed by: INTERNAL MEDICINE

## 2023-02-03 RX ORDER — NIFEDIPINE 30 MG/1
TABLET, FILM COATED, EXTENDED RELEASE ORAL
COMMUNITY
Start: 2023-01-21 | End: 2023-03-27

## 2023-02-03 NOTE — PROGRESS NOTES
"    Subjective:     Encounter Date:02/03/2023      Patient ID: Dee Herrera is a 32 y.o. female.    Chief Complaint:  History of Present Illness    This is a 32-year-old with ESRD on dialysis Tuesday, Thursday, Saturday, hypertension, systemic lupus erythematosus, who presents for evaluation of a pericardial effusion and possible cardiac cirrhosis.    Patient reports that she was diagnosed with end-stage renal disease, systemic lupus, and hypertension all about 6 months ago.  In 7/2022.  She has been on dialysis since around that time.  She reports that she was in good health until about a month prior to that admission.  Is been receiving dialysis through tunneled dialysis catheter.  There are plans to proceed with AV fistula placement.  She is followed by rheumatology for her lupus and is currently on CellCept and prednisone.  There were plans to resume hydroxychloroquine and but she has been unable to resume this as of yet.  It is felt that her renal disease is due to lupus.  She has been evaluated by renal transplant team at [facility].    Over the last few months she has been struggling with significant nausea and vomiting.  As part of her work-up she underwent [facility]  This reported findings concerning for duodenitis and small bowel enteritis, a large pericardial effusion, moderate right and small left pleural effusion, soft tissue anasarca, reactive bilateral inguinal lymphadenopathy, and reportedly findings consistent with \"cardiac cirrhosis\".  She recently saw Dr. Corey in follow-up on 2/1/2023.  Based on these findings she was referred to our office for an echocardiogram and further evaluation.    She underwent an echocardiogram today which showed severe left ventricular hypertrophy, with homogeneously speckled and echo bright myocardium concerning for infiltrative cardiomyopathy such as amyloid versus due to longstanding renal " disease, normal left ventricular systolic function with an EF of 64%, longitudinal LV strain of -15%, mildly dilated left ventricle, mildly dilated left atrium, increased left atrial pressure, insufficient TR velocity for RVSP measurement, and a small to moderate pericardial effusion most prominent posteriorly with a small amount anteriorly without any evidence of tamponade.    Patient continues to struggle with nausea and vomiting.  In fact she had a bout of nausea and vomiting in the exam room today.  She reports chronic, intermittent with shortness of breath but nothing worsening or acute.  She had some issues with chest discomfort a couple months ago but nothing recent.  She reports intermittent palpitations and again are unchanged.  She has episodes of lightheadedness which again are stable and unchanged.  She denies any near-syncope or syncope or lower extremity edema.  She reports that she really does not urinate much.  There is no family history of heart disease that she or her mother who is accompanying her today are aware of.    Review of Systems   Constitutional: Positive for malaise/fatigue.   HENT: Negative for hearing loss, hoarse voice, nosebleeds and sore throat.    Eyes: Negative for pain.   Cardiovascular: Positive for dyspnea on exertion and palpitations. Negative for chest pain, claudication, cyanosis, irregular heartbeat, leg swelling, near-syncope, orthopnea, paroxysmal nocturnal dyspnea and syncope.   Respiratory: Negative for shortness of breath and snoring.    Endocrine: Negative for cold intolerance, heat intolerance, polydipsia, polyphagia and polyuria.   Skin: Negative for itching and rash.   Musculoskeletal: Negative for arthritis, falls, joint pain, joint swelling, muscle cramps, muscle weakness and myalgias.   Gastrointestinal: Negative for constipation, diarrhea, dysphagia, heartburn, hematemesis, hematochezia, melena, nausea and vomiting.   Genitourinary: Negative for frequency,  hematuria and hesitancy.   Neurological: Positive for light-headedness. Negative for excessive daytime sleepiness, dizziness, headaches, numbness and weakness.   Psychiatric/Behavioral: Negative for depression. The patient is not nervous/anxious.          Current Outpatient Medications:   •  carvedilol (Coreg) 25 MG tablet, Take 1 tablet by mouth 2 (Two) Times a Day With Meals., Disp: 60 tablet, Rfl: 3  •  hydroxychloroquine (PLAQUENIL) 200 MG tablet, 200 mg., Disp: , Rfl:   •  lacosamide (VIMPAT) 50 MG tablet tablet, 50 mg., Disp: , Rfl:   •  Methoxy PEG-Epoetin Beta (MIRCERA IJ), 225 mcg Every 14 (Fourteen) Days., Disp: , Rfl:   •  minoxidil (LONITEN) 10 MG tablet, Take 1 tablet by mouth Daily., Disp: 90 tablet, Rfl: 1  •  mycophenolate (CELLCEPT) 500 MG tablet, 1,000 mg., Disp: , Rfl:   •  NIFEdipine CC (ADALAT CC) 30 MG 24 hr tablet, Take  by mouth., Disp: , Rfl:   •  NIFEdipine XL (PROCARDIA XL) 60 MG 24 hr tablet, 60 mg., Disp: , Rfl:   •  omeprazole (priLOSEC) 40 MG capsule, Take 1 capsule by mouth Daily., Disp: 90 capsule, Rfl: 1  •  ondansetron ODT (ZOFRAN-ODT) 4 MG disintegrating tablet, 4 mg., Disp: , Rfl:   •  predniSONE (DELTASONE) 10 MG tablet, Take 2 tablets by mouth Daily., Disp: 60 tablet, Rfl: 11    Past Medical History:   Diagnosis Date   • History of anemia    • History of transfusion    • Iron deficiency anemia 09/27/2021   • Lupus (systemic lupus erythematosus) (HCC) 07/30/2022   • Other specified nutritional anemias    • Seizures (HCC)    • Vitamin D deficiency 09/27/2021       Past Surgical History:   Procedure Laterality Date   • INSERTION HEMODIALYSIS CATHETER N/A 7/26/2022    Procedure: RIGHT TUNNELED DIALYSIS CATHETER PLACEMENT;  Surgeon: Diandra Adhikari MD;  Location: [facility] OR;  Service: Vascular;  Laterality: N/A;   • NO PAST SURGERIES     • TONSILLECTOMY         Family History   Problem Relation Age of Onset   • Autoimmune disease Mother    • Anemia Mother    • Diabetes  "Sister    • Anemia Brother    • Diabetes Maternal Grandmother    • Hypertension Maternal Grandmother    • Cancer Maternal Grandmother    • Sickle cell anemia Cousin    • Malig Hyperthermia Neg Hx        Social History     Tobacco Use   • Smoking status: Never   • Smokeless tobacco: Never   Vaping Use   • Vaping Use: Never used   Substance Use Topics   • Alcohol use: Not Currently     Comment: social   • Drug use: Yes     Types: Marijuana         ECG 12 Lead    Date/Time: 2/3/2023 4:07 PM  Performed by: Juana Taylor MD  Authorized by: Juana Taylor MD   Comparison: compared with previous ECG   Comparison to previous ECG: Repolarization changes are more prominent  Rhythm: sinus rhythm  Other findings: left ventricular hypertrophy with strain               Objective:     Visit Vitals  /80 (BP Location: Left arm, Patient Position: Sitting, Cuff Size: Adult)   Pulse 80   Ht 166.4 cm (65.5\")   Wt 52.5 kg (115 lb 12.8 oz)   LMP  (LMP Unknown)   SpO2 97%   BMI 18.98 kg/m²         Constitutional:       Appearance: Normal appearance. Well-developed.   Eyes:      General: Lids are normal.      Conjunctiva/sclera: Conjunctivae normal.      Pupils: Pupils are equal, round, and reactive to light.   HENT:      Head: Normocephalic and atraumatic.   Neck:      Vascular: No carotid bruit or JVD.      Lymphadenopathy: No cervical adenopathy.   Pulmonary:      Effort: Pulmonary effort is normal.      Breath sounds: Normal breath sounds.   Cardiovascular:      Normal rate. Regular rhythm.      No gallop.   Pulses:     Radial: 2+ bilaterally.  Edema:     Peripheral edema absent.   Abdominal:      Palpations: Abdomen is soft.   Musculoskeletal:      Cervical back: Full passive range of motion without pain, normal range of motion and neck supple. Skin:     General: Skin is warm and dry.   Neurological:      Mental Status: Alert and oriented to person, place, and time.             Assessment:          Diagnosis Plan   1. Left " ventricular hypertrophy  Protein Elec + Interp, Serum    PYP Imaging for Cardiac Amyloidosis      2. Essential hypertension  Protein Elec + Interp, Serum    PYP Imaging for Cardiac Amyloidosis      3. ESRD (end stage renal disease) (HCC)  Protein Elec + Interp, Serum    PYP Imaging for Cardiac Amyloidosis      4. Cirrhosis of liver without ascites, unspecified hepatic cirrhosis type (HCC)  Protein Elec + Interp, Serum    PYP Imaging for Cardiac Amyloidosis      5. Pericardial effusion        6. Stage 5 chronic kidney disease (HCC)        7. Systemic lupus erythematosus, unspecified SLE type, unspecified organ involvement status (HCC)               Plan:         1.  Pericardial effusion.  She has a small to moderate pericardial effusion on her echocardiogram today without any evidence of tamponade.  Suspect the effusion is due to her renal disease.  The majority of her effusion is posterior and not amenable to any kind of intervention.  Without any evidence of hemodynamic or cardiac compromise I would not pursue any invasive treatment of this.  Best treatment for this is continued dialysis for volume management.  2.  Severe left ventricular hypertrophy.  Associated with speckled and echo bright appearance.  This could all be due to her renal disease but I think with her renal disease and concerns for cirrhosis we should rule out other causes for infiltrative cardiomyopathy such as amyloid.  We will order an SPEP.  Unfortunately we cannot get a UPEP because she is unable to produce urine.  We will also set her up for a PYP scan.  3.  Hypertension.  Well-controlled on her current regimen.  Continue the same.  4.  Cirrhosis.  Her echocardiogram does not have any findings that would lead to significant venous congestion leading to cirrhosis.  She  has been referred to GI.  5.  Nausea/vomiting.  She is undergoing evaluation by GI.  6.  ESRD.  On hemodialysis.  7.  Systemic lupus erythematosus.  Followed by  rheumatology.    I will call and discuss results of her PYP scan and SPEP.  Otherwise we will tentatively plan on seeing the patient back again in 3 months.

## 2023-02-06 ENCOUNTER — TELEPHONE (OUTPATIENT)
Dept: GASTROENTEROLOGY | Facility: CLINIC | Age: 33
End: 2023-02-06
Payer: MEDICAID

## 2023-02-06 NOTE — TELEPHONE ENCOUNTER
"SW - Please schedule her to see me in the office next Monday, 2/13 in the AM.        Dr. Orion Corey sent me a note that said:  \"Drew, I got a patient that has renal failure and is on dialysis.  She was having severe abdominal pain and a CT of the abdomen pelvis was obtained.  She has marked thickening of the duodenal loop with 8 mm concern for duodenitis and small bowel enteritis.  She also has possible acute pancreatitis and or pancreatic phlegmon.  She also has a diagnosis of lupus and I have ordered duplex mesenteric artery scan to look for atherosclerotic or vasculitis.  I asked that she be referred to you, could you possibly take a look at her.  Her name is Dee Herrera MR# [id number] .  She is only 33 years old.\"       Thx. kjh  "

## 2023-02-13 ENCOUNTER — APPOINTMENT (OUTPATIENT)
Dept: GENERAL RADIOLOGY | Facility: HOSPITAL | Age: 33
DRG: 304 | End: 2023-02-13
Payer: MEDICAID

## 2023-02-13 ENCOUNTER — HOSPITAL ENCOUNTER (INPATIENT)
Facility: HOSPITAL | Age: 33
LOS: 5 days | Discharge: HOME | DRG: 304 | End: 2023-02-18
Attending: EMERGENCY MEDICINE | Admitting: INTERNAL MEDICINE
Payer: MEDICAID

## 2023-02-13 ENCOUNTER — TELEPHONE (OUTPATIENT)
Dept: GASTROENTEROLOGY | Facility: CLINIC | Age: 33
End: 2023-02-13
Payer: MEDICAID

## 2023-02-13 ENCOUNTER — APPOINTMENT (OUTPATIENT)
Dept: CT IMAGING | Facility: HOSPITAL | Age: 33
DRG: 304 | End: 2023-02-13
Payer: MEDICAID

## 2023-02-13 ENCOUNTER — OFFICE VISIT (OUTPATIENT)
Dept: GASTROENTEROLOGY | Facility: CLINIC | Age: 33
End: 2023-02-13
Payer: MEDICAID

## 2023-02-13 VITALS
OXYGEN SATURATION: 98 % | SYSTOLIC BLOOD PRESSURE: 210 MMHG | TEMPERATURE: 98.2 F | HEART RATE: 82 BPM | DIASTOLIC BLOOD PRESSURE: 147 MMHG | HEIGHT: 66 IN | WEIGHT: 120.2 LBS | BODY MASS INDEX: 19.32 KG/M2

## 2023-02-13 DIAGNOSIS — N18.9 ACUTE RENAL FAILURE SUPERIMPOSED ON CHRONIC KIDNEY DISEASE, UNSPECIFIED CKD STAGE, UNSPECIFIED ACUTE RENAL FAILURE TYPE: ICD-10-CM

## 2023-02-13 DIAGNOSIS — K29.80 DUODENITIS: ICD-10-CM

## 2023-02-13 DIAGNOSIS — R63.4 WEIGHT LOSS: ICD-10-CM

## 2023-02-13 DIAGNOSIS — D61.818 PANCYTOPENIA: ICD-10-CM

## 2023-02-13 DIAGNOSIS — R10.9 ABDOMINAL PAIN, UNSPECIFIED ABDOMINAL LOCATION: ICD-10-CM

## 2023-02-13 DIAGNOSIS — N17.9 ACUTE RENAL FAILURE SUPERIMPOSED ON CHRONIC KIDNEY DISEASE, UNSPECIFIED CKD STAGE, UNSPECIFIED ACUTE RENAL FAILURE TYPE: ICD-10-CM

## 2023-02-13 DIAGNOSIS — R19.7 DIARRHEA, UNSPECIFIED TYPE: ICD-10-CM

## 2023-02-13 DIAGNOSIS — R77.8 ELEVATED TROPONIN: ICD-10-CM

## 2023-02-13 DIAGNOSIS — K86.1 IDIOPATHIC CHRONIC PANCREATITIS: ICD-10-CM

## 2023-02-13 DIAGNOSIS — G62.9 NEUROPATHY: ICD-10-CM

## 2023-02-13 DIAGNOSIS — N28.89 HYPERTENSION SECONDARY TO OTHER RENAL DISORDERS: Primary | ICD-10-CM

## 2023-02-13 DIAGNOSIS — G40.909 SEIZURE DISORDER: ICD-10-CM

## 2023-02-13 DIAGNOSIS — K76.1 CARDIAC CIRRHOSIS: ICD-10-CM

## 2023-02-13 DIAGNOSIS — I16.1 HYPERTENSIVE EMERGENCY: Primary | ICD-10-CM

## 2023-02-13 DIAGNOSIS — I15.1 HYPERTENSION SECONDARY TO OTHER RENAL DISORDERS: Primary | ICD-10-CM

## 2023-02-13 LAB
ALBUMIN SERPL-MCNC: 3.7 G/DL (ref 3.5–5.2)
ALBUMIN/GLOB SERPL: 1.4 G/DL
ALP SERPL-CCNC: 41 U/L (ref 39–117)
ALT SERPL W P-5'-P-CCNC: 9 U/L (ref 1–33)
ANION GAP SERPL CALCULATED.3IONS-SCNC: 8 MMOL/L (ref 5–15)
ANION GAP SERPL CALCULATED.3IONS-SCNC: 9 MMOL/L (ref 5–15)
AST SERPL-CCNC: 42 U/L (ref 1–32)
BASOPHILS # BLD AUTO: 0.01 10*3/MM3 (ref 0–0.2)
BASOPHILS NFR BLD AUTO: 0.4 % (ref 0–1.5)
BILIRUB SERPL-MCNC: 1.2 MG/DL (ref 0–1.2)
BUN SERPL-MCNC: 14 MG/DL (ref 6–20)
BUN SERPL-MCNC: 17 MG/DL (ref 6–20)
BUN/CREAT SERPL: 2 (ref 7–25)
BUN/CREAT SERPL: 2.3 (ref 7–25)
CALCIUM SPEC-SCNC: 8.7 MG/DL (ref 8.6–10.5)
CALCIUM SPEC-SCNC: 9.1 MG/DL (ref 8.6–10.5)
CHLORIDE SERPL-SCNC: 96 MMOL/L (ref 98–107)
CHLORIDE SERPL-SCNC: 96 MMOL/L (ref 98–107)
CO2 SERPL-SCNC: 30 MMOL/L (ref 22–29)
CO2 SERPL-SCNC: 30 MMOL/L (ref 22–29)
CREAT SERPL-MCNC: 6.91 MG/DL (ref 0.57–1)
CREAT SERPL-MCNC: 7.53 MG/DL (ref 0.57–1)
DACRYOCYTES BLD QL SMEAR: NORMAL
DEPRECATED RDW RBC AUTO: 55.4 FL (ref 37–54)
EGFRCR SERPLBLD CKD-EPI 2021: 6.9 ML/MIN/1.73
EGFRCR SERPLBLD CKD-EPI 2021: 7.7 ML/MIN/1.73
EOSINOPHIL # BLD AUTO: 0 10*3/MM3 (ref 0–0.4)
EOSINOPHIL NFR BLD AUTO: 0 % (ref 0.3–6.2)
ERYTHROCYTE [DISTWIDTH] IN BLOOD BY AUTOMATED COUNT: 19.7 % (ref 12.3–15.4)
GLOBULIN UR ELPH-MCNC: 2.6 GM/DL
GLUCOSE SERPL-MCNC: 108 MG/DL (ref 65–99)
GLUCOSE SERPL-MCNC: 89 MG/DL (ref 65–99)
HCT VFR BLD AUTO: 35 % (ref 34–46.6)
HGB BLD-MCNC: 10.4 G/DL (ref 12–15.9)
HYPOCHROMIA BLD QL: NORMAL
LYMPHOCYTES # BLD AUTO: 0.73 10*3/MM3 (ref 0.7–3.1)
LYMPHOCYTES NFR BLD AUTO: 29.7 % (ref 19.6–45.3)
MCH RBC QN AUTO: 24.1 PG (ref 26.6–33)
MCHC RBC AUTO-ENTMCNC: 29.7 G/DL (ref 31.5–35.7)
MCV RBC AUTO: 81.2 FL (ref 79–97)
MONOCYTES # BLD AUTO: 0.45 10*3/MM3 (ref 0.1–0.9)
MONOCYTES NFR BLD AUTO: 18.3 % (ref 5–12)
NEUTROPHILS NFR BLD AUTO: 1.25 10*3/MM3 (ref 1.7–7)
NEUTROPHILS NFR BLD AUTO: 50.8 % (ref 42.7–76)
NT-PROBNP SERPL-MCNC: ABNORMAL PG/ML (ref 0–450)
PLATELET # BLD AUTO: 26 10*3/MM3 (ref 140–450)
POIKILOCYTOSIS BLD QL SMEAR: NORMAL
POTASSIUM SERPL-SCNC: 4.6 MMOL/L (ref 3.5–5.2)
POTASSIUM SERPL-SCNC: 4.7 MMOL/L (ref 3.5–5.2)
PROT SERPL-MCNC: 6.3 G/DL (ref 6–8.5)
QT INTERVAL: 414 MS
RBC # BLD AUTO: 4.31 10*6/MM3 (ref 3.77–5.28)
SCHISTOCYTES BLD QL SMEAR: NORMAL
SODIUM SERPL-SCNC: 134 MMOL/L (ref 136–145)
SODIUM SERPL-SCNC: 135 MMOL/L (ref 136–145)
TROPONIN T SERPL HS-MCNC: 66 NG/L
TROPONIN T SERPL HS-MCNC: 68 NG/L
WBC NRBC COR # BLD: 2.46 10*3/MM3 (ref 3.4–10.8)

## 2023-02-13 PROCEDURE — 84484 ASSAY OF TROPONIN QUANT: CPT | Performed by: INTERNAL MEDICINE

## 2023-02-13 PROCEDURE — 83880 ASSAY OF NATRIURETIC PEPTIDE: CPT | Performed by: EMERGENCY MEDICINE

## 2023-02-13 PROCEDURE — 63710000001 MYCOPHENOLATE MOFETIL PER 250 MG: Performed by: INTERNAL MEDICINE

## 2023-02-13 PROCEDURE — 93010 ELECTROCARDIOGRAM REPORT: CPT | Performed by: INTERNAL MEDICINE

## 2023-02-13 PROCEDURE — 25010000002 HYDRALAZINE PER 20 MG: Performed by: EMERGENCY MEDICINE

## 2023-02-13 PROCEDURE — 85025 COMPLETE CBC W/AUTO DIFF WBC: CPT | Performed by: EMERGENCY MEDICINE

## 2023-02-13 PROCEDURE — 84484 ASSAY OF TROPONIN QUANT: CPT | Performed by: EMERGENCY MEDICINE

## 2023-02-13 PROCEDURE — 36415 COLL VENOUS BLD VENIPUNCTURE: CPT

## 2023-02-13 PROCEDURE — 99204 OFFICE O/P NEW MOD 45 MIN: CPT | Performed by: INTERNAL MEDICINE

## 2023-02-13 PROCEDURE — 70450 CT HEAD/BRAIN W/O DYE: CPT

## 2023-02-13 PROCEDURE — 80053 COMPREHEN METABOLIC PANEL: CPT | Performed by: EMERGENCY MEDICINE

## 2023-02-13 PROCEDURE — 99285 EMERGENCY DEPT VISIT HI MDM: CPT

## 2023-02-13 PROCEDURE — 85007 BL SMEAR W/DIFF WBC COUNT: CPT | Performed by: EMERGENCY MEDICINE

## 2023-02-13 PROCEDURE — 71045 X-RAY EXAM CHEST 1 VIEW: CPT

## 2023-02-13 PROCEDURE — 93005 ELECTROCARDIOGRAM TRACING: CPT | Performed by: EMERGENCY MEDICINE

## 2023-02-13 RX ORDER — SODIUM CHLORIDE 0.9 % (FLUSH) 0.9 %
10 SYRINGE (ML) INJECTION AS NEEDED
Status: DISCONTINUED | OUTPATIENT
Start: 2023-02-13 | End: 2023-02-18 | Stop reason: HOSPADM

## 2023-02-13 RX ORDER — UREA 10 %
3 LOTION (ML) TOPICAL NIGHTLY PRN
Status: DISCONTINUED | OUTPATIENT
Start: 2023-02-13 | End: 2023-02-18 | Stop reason: HOSPADM

## 2023-02-13 RX ORDER — ONDANSETRON 2 MG/ML
4 INJECTION INTRAMUSCULAR; INTRAVENOUS EVERY 6 HOURS PRN
Status: DISCONTINUED | OUTPATIENT
Start: 2023-02-13 | End: 2023-02-18 | Stop reason: HOSPADM

## 2023-02-13 RX ORDER — NIFEDIPINE 60 MG/1
60 TABLET, EXTENDED RELEASE ORAL 2 TIMES DAILY
Status: DISCONTINUED | OUTPATIENT
Start: 2023-02-13 | End: 2023-02-18 | Stop reason: HOSPADM

## 2023-02-13 RX ORDER — HYDROXYCHLOROQUINE SULFATE 200 MG/1
200 TABLET, FILM COATED ORAL
Status: DISCONTINUED | OUTPATIENT
Start: 2023-02-14 | End: 2023-02-15

## 2023-02-13 RX ORDER — AMLODIPINE BESYLATE 5 MG/1
5 TABLET ORAL ONCE
Status: DISCONTINUED | OUTPATIENT
Start: 2023-02-13 | End: 2023-02-13

## 2023-02-13 RX ORDER — HYDRALAZINE HYDROCHLORIDE 20 MG/ML
20 INJECTION INTRAMUSCULAR; INTRAVENOUS ONCE
Status: COMPLETED | OUTPATIENT
Start: 2023-02-13 | End: 2023-02-13

## 2023-02-13 RX ORDER — AMLODIPINE BESYLATE 10 MG/1
10 TABLET ORAL
Status: DISCONTINUED | OUTPATIENT
Start: 2023-02-13 | End: 2023-02-13

## 2023-02-13 RX ORDER — MINOXIDIL 10 MG/1
10 TABLET ORAL EVERY 12 HOURS SCHEDULED
Status: DISCONTINUED | OUTPATIENT
Start: 2023-02-13 | End: 2023-02-14

## 2023-02-13 RX ORDER — ONDANSETRON 4 MG/1
4 TABLET, FILM COATED ORAL EVERY 6 HOURS PRN
Status: DISCONTINUED | OUTPATIENT
Start: 2023-02-13 | End: 2023-02-18 | Stop reason: HOSPADM

## 2023-02-13 RX ORDER — NIFEDIPINE 60 MG/1
60 TABLET, EXTENDED RELEASE ORAL ONCE
Status: COMPLETED | OUTPATIENT
Start: 2023-02-13 | End: 2023-02-13

## 2023-02-13 RX ORDER — PANTOPRAZOLE SODIUM 40 MG/1
40 TABLET, DELAYED RELEASE ORAL
Status: DISCONTINUED | OUTPATIENT
Start: 2023-02-14 | End: 2023-02-14

## 2023-02-13 RX ORDER — MYCOPHENOLATE MOFETIL 500 MG/1
1000 TABLET ORAL EVERY 12 HOURS SCHEDULED
Status: DISCONTINUED | OUTPATIENT
Start: 2023-02-13 | End: 2023-02-15

## 2023-02-13 RX ORDER — CARVEDILOL 25 MG/1
25 TABLET ORAL EVERY 12 HOURS SCHEDULED
Status: DISCONTINUED | OUTPATIENT
Start: 2023-02-13 | End: 2023-02-18 | Stop reason: HOSPADM

## 2023-02-13 RX ORDER — LACOSAMIDE 100 MG/1
50 TABLET ORAL EVERY 12 HOURS SCHEDULED
Status: DISCONTINUED | OUTPATIENT
Start: 2023-02-13 | End: 2023-02-18 | Stop reason: HOSPADM

## 2023-02-13 RX ORDER — PREDNISONE 20 MG/1
20 TABLET ORAL DAILY
Status: DISCONTINUED | OUTPATIENT
Start: 2023-02-14 | End: 2023-02-14

## 2023-02-13 RX ORDER — ACETAMINOPHEN 325 MG/1
650 TABLET ORAL EVERY 4 HOURS PRN
Status: DISCONTINUED | OUTPATIENT
Start: 2023-02-13 | End: 2023-02-18 | Stop reason: HOSPADM

## 2023-02-13 RX ORDER — CARVEDILOL 25 MG/1
25 TABLET ORAL ONCE
Status: COMPLETED | OUTPATIENT
Start: 2023-02-13 | End: 2023-02-13

## 2023-02-13 RX ADMIN — HYDRALAZINE HYDROCHLORIDE 20 MG: 20 INJECTION INTRAMUSCULAR; INTRAVENOUS at 11:50

## 2023-02-13 RX ADMIN — ACETAMINOPHEN 650 MG: 325 TABLET, FILM COATED ORAL at 21:38

## 2023-02-13 RX ADMIN — LACOSAMIDE 50 MG: 100 TABLET, FILM COATED ORAL at 23:28

## 2023-02-13 RX ADMIN — NIFEDIPINE 60 MG: 60 TABLET, FILM COATED, EXTENDED RELEASE ORAL at 23:28

## 2023-02-13 RX ADMIN — MYCOPHENOLATE MOFETIL 1000 MG: 500 TABLET ORAL at 23:29

## 2023-02-13 RX ADMIN — Medication 3 MG: at 21:38

## 2023-02-13 RX ADMIN — CARVEDILOL 25 MG: 25 TABLET, FILM COATED ORAL at 11:51

## 2023-02-13 RX ADMIN — CARVEDILOL 25 MG: 25 TABLET, FILM COATED ORAL at 20:14

## 2023-02-13 RX ADMIN — NIFEDIPINE 60 MG: 60 TABLET, FILM COATED, EXTENDED RELEASE ORAL at 13:33

## 2023-02-13 RX ADMIN — MINOXIDIL 10 MG: 10 TABLET ORAL at 20:14

## 2023-02-13 NOTE — ED NOTES
"..Nursing report ED to floor  Dee Herrera  32 y.o.  female    HPI :   Chief Complaint   Patient presents with    Hypertension       Admitting doctor:   Laurence Cedeno MD    Admitting diagnosis:   The primary encounter diagnosis was Hypertensive emergency. Diagnoses of Acute renal failure superimposed on chronic kidney disease, unspecified CKD stage, unspecified acute renal failure type (HCC) and Elevated troponin were also pertinent to this visit.    Code status:   Current Code Status       Date Active Code Status Order ID Comments User Context       Prior            Allergies:   Patient has no known allergies.    Isolation:   No active isolations    Intake and Output  No intake or output data in the 24 hours ending 02/13/23 1355    Weight:       02/13/23  1058   Weight: 54.4 kg (120 lb)       Most recent vitals:   Vitals:    02/13/23 1044 02/13/23 1058 02/13/23 1150 02/13/23 1331   BP:  (!) 205/158 (!) 236/164 (!) 168/122   Pulse: 85  74 79   Resp: 16      Temp: 98.9 °F (37.2 °C)      TempSrc: Tympanic      SpO2: 100%   99%   Weight:  54.4 kg (120 lb)     Height:  165.1 cm (65\")         Active LDAs/IV Access:   Lines, Drains & Airways       Active LDAs       Name Placement date Placement time Site Days    Peripheral IV 02/13/23 1149 Right Antecubital 02/13/23  1149  Antecubital  less than 1    Hemodialysis Cath Double 07/26/22  0935  Internal Jugular  202                    Labs (abnormal labs have a star):   Labs Reviewed   COMPREHENSIVE METABOLIC PANEL - Abnormal; Notable for the following components:       Result Value    Creatinine 6.91 (*)     Sodium 135 (*)     Chloride 96 (*)     CO2 30.0 (*)     AST (SGOT) 42 (*)     BUN/Creatinine Ratio 2.0 (*)     eGFR 7.7 (*)     All other components within normal limits    Narrative:     GFR Normal >60  Chronic Kidney Disease <60  Kidney Failure <15     SINGLE HSTROPONIN T - Abnormal; Notable for the following components:    HS Troponin T 66 (*)     All other " components within normal limits    Narrative:     High Sensitive Troponin T Reference Range:  <10.0 ng/L- Negative Female for AMI  <15.0 ng/L- Negative Male for AMI  >=10 - Abnormal Female indicating possible myocardial injury.  >=15 - Abnormal Male indicating possible myocardial injury.   Clinicians would have to utilize clinical acumen, EKG, Troponin, and serial changes to determine if it is an Acute Myocardial Infarction or myocardial injury due to an underlying chronic condition.        CBC WITH AUTO DIFFERENTIAL - Abnormal; Notable for the following components:    WBC 2.46 (*)     Hemoglobin 10.4 (*)     MCH 24.1 (*)     MCHC 29.7 (*)     RDW 19.7 (*)     RDW-SD 55.4 (*)     Platelets 26 (*)     Monocyte % 18.3 (*)     Eosinophil % 0.0 (*)     Neutrophils, Absolute 1.25 (*)     All other components within normal limits   BNP (IN-HOUSE) - Abnormal; Notable for the following components:    proBNP >70,000.0 (*)     All other components within normal limits    Narrative:     Among patients with dyspnea, NT-proBNP is highly sensitive for the detection of acute congestive heart failure. In addition NT-proBNP of <300 pg/ml effectively rules out acute congestive heart failure with 99% negative predictive value.    Results may be falsely decreased if patient taking Biotin.     SCAN SLIDE   CBC AND DIFFERENTIAL    Narrative:     The following orders were created for panel order CBC & Differential.  Procedure                               Abnormality         Status                     ---------                               -----------         ------                     CBC Auto Differential[[id number]]        Abnormal            Final result               Scan Slide[[id number]]                                       Final result                 Please view results for these tests on the individual orders.       EKG:   ECG 12 Lead Dyspnea   Final Result   HEART RATE= 82  bpm   RR Interval= 732  ms   AR Interval= 157  ms   P  Horizontal Axis= -33  deg   P Front Axis= 27  deg   QRSD Interval= 100  ms   QT Interval= 414  ms   QRS Axis= -44  deg   T Wave Axis= 151  deg   - ABNORMAL ECG -   Sinus rhythm   Probable left atrial enlargement   Left anterior fascicular block   Abnormal T, consider ischemia, lateral leads   No change from previous tracing   Electronically Signed By: Patricia Bardales ([facility]) 13-Feb-2023 12:59:44   Date and Time of Study: 2023-02-13 11:55:05          Meds given in ED:   Medications   sodium chloride 0.9 % flush 10 mL (has no administration in time range)   carvedilol (COREG) tablet 25 mg (25 mg Oral Given 2/13/23 1151)   hydrALAZINE (APRESOLINE) injection 20 mg (20 mg Intravenous Given 2/13/23 1150)   NIFEdipine XL (PROCARDIA XL) 24 hr tablet 60 mg (60 mg Oral Given 2/13/23 1333)       Imaging results:  CT Head Without Contrast    Result Date: 2/13/2023  1. Atrophy and minimal changes of small vessel ischemic disease. These findings are somewhat advanced for a patient of this age. 2. No acute intracranial process. 3. Mild ethmoid sinusitis. 4. Findings were discussed with Dr. Hawthorne.   Radiation dose reduction techniques were utilized, including automated exposure control and exposure modulation based on body size.       XR Chest 1 View    Result Date: 2/13/2023  Small left basilar atelectasis/infiltrate/effusion, follow-up suggested. Cardiomegaly.  This report was finalized on 2/13/2023 12:18 PM by Dr. Norm Arshad M.D.       Ambulatory status:   - with assist    Social issues:   Social History     Socioeconomic History    Marital status: Single   Tobacco Use    Smoking status: Never    Smokeless tobacco: Never   Vaping Use    Vaping Use: Never used   Substance and Sexual Activity    Alcohol use: Not Currently     Comment: social    Drug use: Yes     Types: Marijuana    Sexual activity: Not Currently     Partners: Male     Birth control/protection: None       Rufina Kraft RN  02/13/23 13:55 EST

## 2023-02-13 NOTE — ED TRIAGE NOTES
sbp 188 at [facility] this am.  She is on bp meds    Patient was placed in face mask during first look triage.  Patient was wearing a face mask throughout encounter.  I wore personal protective equipment throughout the encounter.  Hand hygiene was performed before and after patient encounter.

## 2023-02-13 NOTE — PLAN OF CARE
Goal Outcome Evaluation:              Outcome Evaluation: Pt admitted to  with hypertensive emergency. BP elevated upon admission. Nephrology aware. Plan to start cardene gtt. Admission orders in. Pt in contact spore for c diff rule out. Stool sample needed. No other complaints, will continue to monitor.

## 2023-02-13 NOTE — PROGRESS NOTES
"Chief Complaint   Patient presents with   • Abdominal Pain       History of Present Illness:   32 y.o. female whose Internist, Dr. Orion Corey, sent me a note this month that said:    \"Drew, I got a patient that has renal failure and is on dialysis.  She was having severe abdominal pain and a CT of the abdomen pelvis was obtained.  She has marked thickening of the duodenal loop with 8 mm concern for duodenitis and small bowel enteritis.  She also has possible acute pancreatitis and or pancreatic phlegmon.  She also has a diagnosis of lupus and I have ordered duplex mesenteric artery scan to look for atherosclerotic or vasculitis.  I asked that she be referred to you, could you possibly take a look at her.  Her name is Dee Herrera MR# [id number] .  She is only 33 years old.\"       On 11/22/22 she had a CT abd/pelvis without IV contrast at an [facility] hospital that showed:  IMPRESSION:     1.  Marked wall thickening of the duodenal loop, as well as jejunal bowel loops up   to 8 mm concerning for duodenitis and small bowel enteritis which could be post   infectious or inflammatory.   2.  Marked cardiac cirrhosis.  Sludge in the gallbladder.  No biliary ductal   dilatation.   3.  Mild acute pancreatitis with peripancreatic phlegmon.  No pseudocyst or   calcifications.   4.  Moderate right and small left pleural effusion.  Marked pericardial effusion.   Partially seen dialysis catheter in the right atrium.  Soft tissue anasarca in the   thorax, abdomen and pelvis.   5.  Reactive bilateral inguinal including retroperitoneal/left para-aortic   lymphadenopathy likely post infectious or inflammatory.       Signed by Valeria Patel DO        I reviewed her labs from earlier this month which shows that she is pancytopenic.       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    She is being considered for a kidney transplant by the [facility].       She has been out of her BP meds x 11 day. She " thinks her HTN meds are now at the pharmacy.       Has had generalized abdominal pain x 6 mos. Her abd pain is worse when she eats. She has lots of vomiting (with occasional coffee grounds) and nonbloody diarrhea. She has 4-5 BM/day, usually diarrhea. She has lost 30 pounds in the last 6 mos. She has subjective fevers. C/o headache. NO chest pain. + SOA. Last Hemodialysis Sat and next is scheduled for tomorrow.                   Past Medical History:   Diagnosis Date   • History of anemia    • History of transfusion    • Hypertension    • Iron deficiency anemia 09/27/2021   • Lupus (systemic lupus erythematosus) (HCC) 07/30/2022   • Other specified nutritional anemias    • Seizures (HCC)    • Vitamin D deficiency 09/27/2021       Past Surgical History:   Procedure Laterality Date   • INSERTION HEMODIALYSIS CATHETER N/A 07/26/2022    Procedure: RIGHT TUNNELED DIALYSIS CATHETER PLACEMENT;  Surgeon: Diandra Adhikari MD;  Location: [facility];  Service: Vascular;  Laterality: N/A;   • NO PAST SURGERIES     • TONSILLECTOMY           Current Outpatient Medications:   •  carvedilol (Coreg) 25 MG tablet, Take 1 tablet by mouth 2 (Two) Times a Day With Meals., Disp: 60 tablet, Rfl: 3  •  hydroxychloroquine (PLAQUENIL) 200 MG tablet, 200 mg., Disp: , Rfl:   •  lacosamide (VIMPAT) 50 MG tablet tablet, 50 mg., Disp: , Rfl:   •  Methoxy PEG-Epoetin Beta (MIRCERA IJ), 225 mcg Every 14 (Fourteen) Days., Disp: , Rfl:   •  minoxidil (LONITEN) 10 MG tablet, Take 1 tablet by mouth Daily., Disp: 90 tablet, Rfl: 1  •  mycophenolate (CELLCEPT) 500 MG tablet, 1,000 mg., Disp: , Rfl:   •  NIFEdipine CC (ADALAT CC) 30 MG 24 hr tablet, Take  by mouth., Disp: , Rfl:   •  NIFEdipine XL (PROCARDIA XL) 60 MG 24 hr tablet, 60 mg., Disp: , Rfl:   •  omeprazole (priLOSEC) 40 MG capsule, Take 1 capsule by mouth Daily., Disp: 90 capsule, Rfl: 1  •  ondansetron ODT (ZOFRAN-ODT) 4 MG disintegrating tablet, 4 mg., Disp: , Rfl:   •  predniSONE  (DELTASONE) 10 MG tablet, Take 2 tablets by mouth Daily., Disp: 60 tablet, Rfl: 11    No Known Allergies    Family History   Problem Relation Age of Onset   • Autoimmune disease Mother    • Anemia Mother    • Diabetes Sister    • Anemia Brother    • Diabetes Maternal Grandmother    • Hypertension Maternal Grandmother    • Cancer Maternal Grandmother    • Sickle cell anemia Cousin    • Malig Hyperthermia Neg Hx        Social History     Socioeconomic History   • Marital status: Single   Tobacco Use   • Smoking status: Never   • Smokeless tobacco: Never   Vaping Use   • Vaping Use: Never used   Substance and Sexual Activity   • Alcohol use: Not Currently     Comment: social   • Drug use: Yes     Types: Marijuana   • Sexual activity: Not Currently     Partners: Male     Birth control/protection: None       Review of Systems   Gastrointestinal: Positive for abdominal pain and diarrhea.   All other systems reviewed and are negative.    Pertinent positives and negatives documented in the HPI and all other systems reviewed and were found to be negative.  Vitals:    02/13/23 0936   BP: (!) 210/147   Pulse: 82   Temp: 98.2 °F (36.8 °C)   SpO2: 98%       Physical Exam  Vitals reviewed.   Constitutional:       General: She is not in acute distress.     Appearance: Normal appearance. She is well-developed. She is not diaphoretic.   HENT:      Head: Normocephalic and atraumatic. Hair is normal.      Right Ear: Hearing, tympanic membrane, ear canal and external ear normal. No decreased hearing noted. No drainage.      Left Ear: Hearing, tympanic membrane, ear canal and external ear normal. No decreased hearing noted.      Nose: Nose normal. No nasal deformity.      Mouth/Throat:      Mouth: Mucous membranes are moist.   Eyes:      General: Lids are normal.         Right eye: No discharge.         Left eye: No discharge.      Extraocular Movements: Extraocular movements intact.      Conjunctiva/sclera: Conjunctivae normal.       Pupils: Pupils are equal, round, and reactive to light.   Neck:      Thyroid: No thyromegaly.      Vascular: No JVD.      Trachea: No tracheal deviation.   Cardiovascular:      Rate and Rhythm: Normal rate and regular rhythm.      Pulses: Normal pulses.      Heart sounds: Normal heart sounds. No murmur heard.    No friction rub. No gallop.   Pulmonary:      Effort: Pulmonary effort is normal. No respiratory distress.      Breath sounds: Normal breath sounds. No wheezing or rales.   Chest:      Chest wall: No tenderness.   Abdominal:      General: Bowel sounds are normal. There is no distension.      Palpations: Abdomen is soft. There is no mass.      Tenderness: There is abdominal tenderness. There is no guarding.      Hernia: No hernia is present.   Genitourinary:     Rectum: Normal. Guaiac result negative.   Musculoskeletal:         General: No tenderness or deformity. Normal range of motion.      Cervical back: Normal range of motion and neck supple.   Lymphadenopathy:      Cervical: No cervical adenopathy.   Skin:     General: Skin is warm and dry.      Findings: No erythema or rash.   Neurological:      Mental Status: She is alert and oriented to person, place, and time.      Cranial Nerves: No cranial nerve deficit.      Motor: No abnormal muscle tone.      Coordination: Coordination normal.      Deep Tendon Reflexes: Reflexes are normal and symmetric. Reflexes normal.   Psychiatric:         Mood and Affect: Mood normal.         Behavior: Behavior normal.         Thought Content: Thought content normal.         Judgment: Judgment normal.         Diagnoses and all orders for this visit:    1. Hypertension secondary to other renal disorders (Primary)  -     Amylase  -     Lipase  -     CBC & Differential  -     Comprehensive Metabolic Panel  -     Celiac Ab tTG DGP TIgA  -     TSH  -     Ferritin  -     Folate RBC  -     Iron Profile  -     Vitamin B12  -     AFP Tumor Marker  -     Ammonia  -     Protime-INR  -      AFP Tumor Marker  -     Alpha - 1 - Antitrypsin  -     GIANFRANCO  -     Anti-Smooth Muscle Antibody Titer  -     Ceruloplasmin  -     Hepatitis Panel, Acute  -     Mitochondrial Antibodies, M2  -     Protein Elec + Interp, Serum  -     Clostridioides difficile EIA - Stool, Per Rectum  -     Fecal Fat, Qualitative - Stool, Per Rectum  -     Fecal Leukocytes - Stool, Per Rectum  -     Giardia Antigen - Stool, Per Rectum  -     Stool Culture (Reference Lab) - Stool, Per Rectum  -     Ova & Parasite Examination - Stool, Per Rectum    2. Cardiac cirrhosis  -     Amylase  -     Lipase  -     CBC & Differential  -     Comprehensive Metabolic Panel  -     Celiac Ab tTG DGP TIgA  -     TSH  -     Ferritin  -     Folate RBC  -     Iron Profile  -     Vitamin B12  -     AFP Tumor Marker  -     Ammonia  -     Protime-INR  -     AFP Tumor Marker  -     Alpha - 1 - Antitrypsin  -     GIANFRANCO  -     Anti-Smooth Muscle Antibody Titer  -     Ceruloplasmin  -     Hepatitis Panel, Acute  -     Mitochondrial Antibodies, M2  -     Protein Elec + Interp, Serum  -     Clostridioides difficile EIA - Stool, Per Rectum  -     Fecal Fat, Qualitative - Stool, Per Rectum  -     Fecal Leukocytes - Stool, Per Rectum  -     Giardia Antigen - Stool, Per Rectum  -     Stool Culture (Reference Lab) - Stool, Per Rectum  -     Ova & Parasite Examination - Stool, Per Rectum  -     US Liver; Future    3. Idiopathic chronic pancreatitis (HCC)  -     Amylase  -     Lipase  -     CBC & Differential  -     Comprehensive Metabolic Panel  -     Celiac Ab tTG DGP TIgA  -     TSH  -     Ferritin  -     Folate RBC  -     Iron Profile  -     Vitamin B12  -     AFP Tumor Marker  -     Ammonia  -     Protime-INR  -     AFP Tumor Marker  -     Alpha - 1 - Antitrypsin  -     GIANFRANCO  -     Anti-Smooth Muscle Antibody Titer  -     Ceruloplasmin  -     Hepatitis Panel, Acute  -     Mitochondrial Antibodies, M2  -     Protein Elec + Interp, Serum  -     Clostridioides difficile  EIA - Stool, Per Rectum  -     Fecal Fat, Qualitative - Stool, Per Rectum  -     Fecal Leukocytes - Stool, Per Rectum  -     Giardia Antigen - Stool, Per Rectum  -     Stool Culture (Reference Lab) - Stool, Per Rectum  -     Ova & Parasite Examination - Stool, Per Rectum    4. Duodenitis  -     Amylase  -     Lipase  -     CBC & Differential  -     Comprehensive Metabolic Panel  -     Celiac Ab tTG DGP TIgA  -     TSH  -     Ferritin  -     Folate RBC  -     Iron Profile  -     Vitamin B12  -     AFP Tumor Marker  -     Ammonia  -     Protime-INR  -     AFP Tumor Marker  -     Alpha - 1 - Antitrypsin  -     GIANFRANCO  -     Anti-Smooth Muscle Antibody Titer  -     Ceruloplasmin  -     Hepatitis Panel, Acute  -     Mitochondrial Antibodies, M2  -     Protein Elec + Interp, Serum  -     Clostridioides difficile EIA - Stool, Per Rectum  -     Fecal Fat, Qualitative - Stool, Per Rectum  -     Fecal Leukocytes - Stool, Per Rectum  -     Giardia Antigen - Stool, Per Rectum  -     Stool Culture (Reference Lab) - Stool, Per Rectum  -     Ova & Parasite Examination - Stool, Per Rectum    5. Abdominal pain, unspecified abdominal location  -     Amylase  -     Lipase  -     CBC & Differential  -     Comprehensive Metabolic Panel  -     Celiac Ab tTG DGP TIgA  -     TSH  -     Ferritin  -     Folate RBC  -     Iron Profile  -     Vitamin B12  -     AFP Tumor Marker  -     Ammonia  -     Protime-INR  -     AFP Tumor Marker  -     Alpha - 1 - Antitrypsin  -     GIANFRANCO  -     Anti-Smooth Muscle Antibody Titer  -     Ceruloplasmin  -     Hepatitis Panel, Acute  -     Mitochondrial Antibodies, M2  -     Protein Elec + Interp, Serum  -     Clostridioides difficile EIA - Stool, Per Rectum  -     Fecal Fat, Qualitative - Stool, Per Rectum  -     Fecal Leukocytes - Stool, Per Rectum  -     Giardia Antigen - Stool, Per Rectum  -     Stool Culture (Reference Lab) - Stool, Per Rectum  -     Ova & Parasite Examination - Stool, Per Rectum  -     US  Liver; Future    6. Weight loss  -     Amylase  -     Lipase  -     CBC & Differential  -     Comprehensive Metabolic Panel  -     Celiac Ab tTG DGP TIgA  -     TSH  -     Ferritin  -     Folate RBC  -     Iron Profile  -     Vitamin B12  -     AFP Tumor Marker  -     Ammonia  -     Protime-INR  -     AFP Tumor Marker  -     Alpha - 1 - Antitrypsin  -     GIANFRANCO  -     Anti-Smooth Muscle Antibody Titer  -     Ceruloplasmin  -     Hepatitis Panel, Acute  -     Mitochondrial Antibodies, M2  -     Protein Elec + Interp, Serum  -     Clostridioides difficile EIA - Stool, Per Rectum  -     Fecal Fat, Qualitative - Stool, Per Rectum  -     Fecal Leukocytes - Stool, Per Rectum  -     Giardia Antigen - Stool, Per Rectum  -     Stool Culture (Reference Lab) - Stool, Per Rectum  -     Ova & Parasite Examination - Stool, Per Rectum    7. Pancytopenia (HCC)  -     Amylase  -     Lipase  -     CBC & Differential  -     Comprehensive Metabolic Panel  -     Celiac Ab tTG DGP TIgA  -     TSH  -     Ferritin  -     Folate RBC  -     Iron Profile  -     Vitamin B12  -     AFP Tumor Marker  -     Ammonia  -     Protime-INR  -     AFP Tumor Marker  -     Alpha - 1 - Antitrypsin  -     GIANFRANCO  -     Anti-Smooth Muscle Antibody Titer  -     Ceruloplasmin  -     Hepatitis Panel, Acute  -     Mitochondrial Antibodies, M2  -     Protein Elec + Interp, Serum  -     Clostridioides difficile EIA - Stool, Per Rectum  -     Fecal Fat, Qualitative - Stool, Per Rectum  -     Fecal Leukocytes - Stool, Per Rectum  -     Giardia Antigen - Stool, Per Rectum  -     Stool Culture (Reference Lab) - Stool, Per Rectum  -     Ova & Parasite Examination - Stool, Per Rectum    8. Diarrhea, unspecified type  -     Amylase  -     Lipase  -     CBC & Differential  -     Comprehensive Metabolic Panel  -     Celiac Ab tTG DGP TIgA  -     TSH  -     Ferritin  -     Folate RBC  -     Iron Profile  -     Vitamin B12  -     AFP Tumor Marker  -     Ammonia  -      "Protime-INR  -     AFP Tumor Marker  -     Alpha - 1 - Antitrypsin  -     GIANFRANCO  -     Anti-Smooth Muscle Antibody Titer  -     Ceruloplasmin  -     Hepatitis Panel, Acute  -     Mitochondrial Antibodies, M2  -     Protein Elec + Interp, Serum  -     Clostridioides difficile EIA - Stool, Per Rectum  -     Fecal Fat, Qualitative - Stool, Per Rectum  -     Fecal Leukocytes - Stool, Per Rectum  -     Giardia Antigen - Stool, Per Rectum  -     Stool Culture (Reference Lab) - Stool, Per Rectum  -     Ova & Parasite Examination - Stool, Per Rectum      Assessment:  1. Pancytopenia  2. 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 HTN  4. H/o seizure disorder (felt to be from poorly controlled HTN?)  5. H/o abdominal pain  6. Abnormal CT abd with evidence of \"cardiac cirrhosis\".  7. Abnormal CT abd with \"Mild acute pancreatitis with peripancreatic phlegmon.  No pseudocyst or calcifications.\" She is not a drinker of much ETOH. She has sludge in her GB on CT abd.  8. Could the \"duodenitis\" be from the pancreatitis with some involvement of the adjacent duodenum?  9, Diarrhea  10.  Weight loss.  11. Poorly controlled BP. She has been out of meds x 1 day and says she will  her HTN meds today. 180/120 rechecked with P82.  12.     Recommendations:  1. EGD.  I will evaluate the \"duodenitis\" and to look for varices given her \"cirrhosis\" seen on CT abd/pelvis.  We will hold off on the EGD till her BP is better controlled.   2. US of the gallbladder and liver, to see if gallbladder disease could be the source of her previous pancreatitis? And to see if there could be any liver masses given her \"cirrhosis\"?  3. Should she be seen by Hematology to evaluate her pancytopenia?  I will defer this question to Dr. Orion Corey?  4. Labs: Hep profile, ceruloplasmin, AMA, GIANFRANCO (looking for other causes of cirrhosis?), AFP, etc.  5. Stool studies.  6. Get records from her last hospital admission (@ )  7. I think she should go to the ER to " get her BP down today.   8. I will be curious what her repeat CT abd shows (to be done at [facility] this week).  9. F/u 3 weeks.     Return in about 3 weeks (around 3/6/2023).    Drew Kaminski MD  2/13/2023

## 2023-02-13 NOTE — CONSULTS
[facility] Consult Note      Patient Name: Dee Herrera  : 1990  MRN: [id number]  Primary Care Physician:  Bobby Corey MD  Referring Physician: Laurence Cedeno MD  Date of admission: 2023    Subjective     Reason for Consult:  ESRD    HPI:   Dee Herrera is a 32 y.o. female with ESRD on HD (TTS; [facility]; right chest catheter) for the past 6 months, admitted from GI office for further evaluation of very elevated blood pressure 210/147 found there.  Full PMH outlined below; pertinent is lupus nephritis.  She describes refractory hypertension with similarly elevated readings on dialysis days.  Last HD was .    · Home BP medications include minoxidil 10 mg daily, carvedilol 25 mg BID, and amlodipine unknown dose daily (this took the place of nifedipine).  She reports compliance with medications  · Describes shortness of breath for several days; dialysis and fluid removal does not make it better  · Describes chest heaviness also for the past several days  · States that she has had nausea, vomiting, and diarrhea for many weeks; although she feels nauseated now, she states, she is requesting something to eat  · No fever or chills  · No orthopnea and no leg swelling  · Still makes urine    Review of Systems:   14 point review of systems is otherwise negative except for mentioned above on HPI    Personal History     Past Medical History:   Diagnosis Date   • History of anemia    • History of transfusion    • Hypertension    • Iron deficiency anemia 2021   • Lupus (systemic lupus erythematosus) (HCC) 2022   • Other specified nutritional anemias    • Seizures (HCC)    • Vitamin D deficiency 2021       Past Surgical History:   Procedure Laterality Date   • INSERTION HEMODIALYSIS CATHETER N/A 2022    Procedure: RIGHT TUNNELED DIALYSIS CATHETER PLACEMENT;  Surgeon: Diandra Adhikari MD;  Location: [facility];  Service: Vascular;   Laterality: N/A;   • NO PAST SURGERIES     • TONSILLECTOMY         Family History: family history includes Anemia in her brother and mother; Autoimmune disease in her mother; Cancer in her maternal grandmother; Diabetes in her maternal grandmother and sister; Hypertension in her maternal grandmother; Sickle cell anemia in her cousin.    Social History:  reports that she has never smoked. She has never used smokeless tobacco. She reports that she does not currently use alcohol. She reports current drug use. Drug: Marijuana.    Home Medications:  Prior to Admission medications    Medication Sig Start Date End Date Taking? Authorizing Provider   carvedilol (Coreg) 25 MG tablet Take 1 tablet by mouth 2 (Two) Times a Day With Meals. 2/2/23   Bobby Corey MD   hydroxychloroquine (PLAQUENIL) 200 MG tablet 200 mg. 11/22/22   Ivet Manzano MD   lacosamide (VIMPAT) 50 MG tablet tablet 50 mg. 8/29/22   Ivet Manzano MD   Methoxy PEG-Epoetin Beta (MIRCERA IJ) 225 mcg Every 14 (Fourteen) Days. 1/17/23 1/16/24  Ivet Manzano MD   minoxidil (LONITEN) 10 MG tablet Take 1 tablet by mouth Daily. 11/2/22   Bobby Corey MD   mycophenolate (CELLCEPT) 500 MG tablet 1,000 mg. 11/22/22   Ivet Manzano MD   NIFEdipine CC (ADALAT CC) 30 MG 24 hr tablet Take  by mouth. 1/21/23   Ivet Manzano MD   NIFEdipine XL (PROCARDIA XL) 60 MG 24 hr tablet 60 mg. 11/22/22   Ivet Manzano MD   omeprazole (priLOSEC) 40 MG capsule Take 1 capsule by mouth Daily. 2/2/23   Bobby Corey MD   ondansetron ODT (ZOFRAN-ODT) 4 MG disintegrating tablet 4 mg. 11/21/22   Ivet Manzano MD   predniSONE (DELTASONE) 10 MG tablet Take 2 tablets by mouth Daily. 9/27/22 9/27/23  Ivet Manzano MD       Allergies:  No Known Allergies    Objective     Vitals:   Temp:  [98.2 °F (36.8 °C)-98.9 °F (37.2 °C)] 98.9 °F (37.2 °C)  Heart Rate:  [74-85] 79  Resp:  [16] 16  BP: (168-236)/(122-164) 168/122  No  intake or output data in the 24 hours ending 02/13/23 9647    Physical Exam:   Constitutional: Awake, chronically ill, cushingoid  Psychiatric: Very flat affect and depressed mood  HEENT: Sclera anicteric, no conjunctival injection  Neck: Supple, no carotid bruit, trachea at midline, no JVD  Respiratory: Diminished in bases, but no wheezes or crackles; nonlabored on RA  Cardiovascular: RRR, 2/6M, no rub  Gastrointestinal: BS +, soft, + T but no G or R, and nondistended  Vascular: Right chest TDC  : No palpable bladder  Musculoskeletal: No significant edema; + clubbing  Neurologic:  moving all extremities, normal speech though seems reluctant to talk  Skin: Warm and dry       Scheduled Meds:        IV Meds:        Results Reviewed:   I have personally reviewed the results from the time of this admission to 2/13/2023 15:17 EST     Lab Results   Component Value Date    GLUCOSE 89 02/13/2023    CALCIUM 9.1 02/13/2023     (L) 02/13/2023    K 4.7 02/13/2023    CO2 30.0 (H) 02/13/2023    CL 96 (L) 02/13/2023    BUN 14 02/13/2023    CREATININE 6.91 (H) 02/13/2023    EGFRIFAFRI 107 06/25/2021    BCR 2.0 (L) 02/13/2023    ANIONGAP 9.0 02/13/2023      Lab Results   Component Value Date    MG 1.9 02/07/2023    PHOS 6.6 (H) 07/25/2022    ALBUMIN 3.7 02/13/2023           Assessment / Plan     ASSESSMENT:  1.  ESRD: Stable volume by exam and imaging.  Normal potassium and anion gap; last HD was 2/11, and due for HD tomorrow.  Renal biopsy July '22 with proliferative lupus nephritis and membranous lupus nephritis, stage V.  2.  Hypertensive urgency:  suspect she has malignant hypertension; she reports compliance with BP medications.  Denies illicit drugs other than marijuana  3.  Lupus on CellCept and prednisone; also has history of TTP  4.  Chronic N/V/D with 30-pound weight loss over the past 6 months, coinciding with start of dialysis  5.  Pancytopenia  6.  Elevated troponin    PLAN:  1.  Begin Cardene drip  2.  Increase  minoxidil to 10 mg twice daily, and resume carvedilol at home dose  3.  HD tomorrow      Thank you for involving us in the care of Dee Herrera.  Please feel free to call with any questions.    Eduardo Hendricks MD  02/13/23  15:17 [facility]    [facility]  [phone]      Please note that portions of this note were completed with a voice recognition program.

## 2023-02-13 NOTE — ED PROVIDER NOTES
MD ATTESTATION NOTE    The JANENE and I have discussed this patient's history, physical exam, and treatment plan.  I have reviewed the documentation and personally had a face to face interaction with the patient. I affirm the documentation and agree with the treatment and plan.  The attached note describes my personal findings.    I provided a substantive portion of the care of this patient. I personally performed the physical exam, in its entirety.    Independent Historians: Patient    A complete HPI/ROS/PMH/PSH/SH/FH are unobtainable due to: Patient is a poor historian    Chronic or social conditions impacting patient care (social determinants of health): None    Dee Herrera is a 32 y.o. female who presents to the ED c/o having high blood pressure.  She reports that she went to her doctor's office today and they found her blood pressure to be high and they directed her here.  She states that she has not had her blood pressure medicines this morning because of a pharmacy issue.  She states she does not check her blood pressure at home.  She is uncertain when it became high.  She reports some shortness of breath and headache.  She denies any chest pain.  She is not entirely certain about her medication regimen as far as the doses and names.      Review of prior external notes (non-ED): GI note dated today.  Dr. Kaminski feels that she needs an EGD but her blood pressure needs to be better controlled.  She was sent to the emergency room to get her blood pressure down today.  She is to follow-up in 3 weeks.    Review of prior external test results outside of this encounter: She had a CMP dated 2/7/2023 with a creatinine of 5.4 and a BUN of 7.  She had a CBC dated 2/9/2023 that showed hemoglobin 11.5.  Normal platelets.  White blood cell count of 8.3.    On exam:  GENERAL: Awake, alert, no acute distress  SKIN: Warm, dry  HENT: Normocephalic, atraumatic  EYES: no scleral icterus  CV: regular rhythm, regular  rate  RESPIRATORY: normal effort, lungs clear  ABDOMEN: soft, nontender, nondistended  MUSCULOSKELETAL: no deformity, dialysis catheter in right chest  NEURO: alert, moves all extremities, follows commands    Labs  Recent Results (from the past 24 hour(s))   Comprehensive Metabolic Panel    Collection Time: 02/13/23 11:48 AM    Specimen: Blood   Result Value Ref Range    Glucose 89 65 - 99 mg/dL    BUN 14 6 - 20 mg/dL    Creatinine 6.91 (H) 0.57 - 1.00 mg/dL    Sodium 135 (L) 136 - 145 mmol/L    Potassium 4.7 3.5 - 5.2 mmol/L    Chloride 96 (L) 98 - 107 mmol/L    CO2 30.0 (H) 22.0 - 29.0 mmol/L    Calcium 9.1 8.6 - 10.5 mg/dL    Total Protein 6.3 6.0 - 8.5 g/dL    Albumin 3.7 3.5 - 5.2 g/dL    ALT (SGPT) 9 1 - 33 U/L    AST (SGOT) 42 (H) 1 - 32 U/L    Alkaline Phosphatase 41 39 - 117 U/L    Total Bilirubin 1.2 0.0 - 1.2 mg/dL    Globulin 2.6 gm/dL    A/G Ratio 1.4 g/dL    BUN/Creatinine Ratio 2.0 (L) 7.0 - 25.0    Anion Gap 9.0 5.0 - 15.0 mmol/L    eGFR 7.7 (L) >60.0 mL/min/1.73   Single High Sensitivity Troponin T    Collection Time: 02/13/23 11:48 AM    Specimen: Blood   Result Value Ref Range    HS Troponin T 66 (C) <10 ng/L   CBC Auto Differential    Collection Time: 02/13/23 11:48 AM    Specimen: Blood   Result Value Ref Range    WBC 2.46 (L) 3.40 - 10.80 10*3/mm3    RBC 4.31 3.77 - 5.28 10*6/mm3    Hemoglobin 10.4 (L) 12.0 - 15.9 g/dL    Hematocrit 35.0 34.0 - 46.6 %    MCV 81.2 79.0 - 97.0 fL    MCH 24.1 (L) 26.6 - 33.0 pg    MCHC 29.7 (L) 31.5 - 35.7 g/dL    RDW 19.7 (H) 12.3 - 15.4 %    RDW-SD 55.4 (H) 37.0 - 54.0 fl    Platelets 26 (C) 140 - 450 10*3/mm3    Neutrophil % 50.8 42.7 - 76.0 %    Lymphocyte % 29.7 19.6 - 45.3 %    Monocyte % 18.3 (H) 5.0 - 12.0 %    Eosinophil % 0.0 (L) 0.3 - 6.2 %    Basophil % 0.4 0.0 - 1.5 %    Neutrophils, Absolute 1.25 (L) 1.70 - 7.00 10*3/mm3    Lymphocytes, Absolute 0.73 0.70 - 3.10 10*3/mm3    Monocytes, Absolute 0.45 0.10 - 0.90 10*3/mm3    Eosinophils, Absolute 0.00  0.00 - 0.40 10*3/mm3    Basophils, Absolute 0.01 0.00 - 0.20 10*3/mm3   BNP    Collection Time: 02/13/23 11:48 AM    Specimen: Blood   Result Value Ref Range    proBNP >70,000.0 (H) 0.0 - 450.0 pg/mL   Scan Slide    Collection Time: 02/13/23 11:48 AM    Specimen: Blood   Result Value Ref Range    Dacrocytes Mod/2+ None Seen    Hypochromia Mod/2+ None Seen    Poikilocytes Large/3+ None Seen    Schistocytes Slight/1+ None Seen   ECG 12 Lead Dyspnea    Collection Time: 02/13/23 11:55 AM   Result Value Ref Range    QT Interval 414 ms       Radiology  CT Head Without Contrast    Result Date: 2/13/2023  CT OF THE BRAIN WITHOUT CONTRAST 02/13/2023  HISTORY: Headache. Hypertension.  TECHNIQUE: Axial images were obtained through the brain without intravenous contrast.  FINDINGS:  There is mild to moderate diffuse atrophy. There is some mild decreased attenuation of the periventricular white matter particularly in the right superior parietal region which may represent small vessel ischemic disease. There is no evidence of acute infarction, hemorrhage midline shift or mass effect.  There is mild mucosal thickening in the ethmoid sinuses.      1. Atrophy and minimal changes of small vessel ischemic disease. These findings are somewhat advanced for a patient of this age. 2. No acute intracranial process. 3. Mild ethmoid sinusitis. 4. Findings were discussed with Dr. Hawthorne.   Radiation dose reduction techniques were utilized, including automated exposure control and exposure modulation based on body size.  This report was finalized on 2/13/2023 5:24 PM by Dr. Clyde Rayo M.D.      XR Chest 1 View    Result Date: 2/13/2023  XR CHEST 1 VW-  HISTORY: Female who is 32 years-old,  hypertension, short of breath  TECHNIQUE: Frontal view of the chest  COMPARISON: 07/26/2022  FINDINGS: Right-sided central venous catheter appears stable. The heart is enlarged. Pulmonary vasculature is unremarkable. Obscuration of the left  hemidiaphragm suggests small left basilar atelectasis/infiltrate/effusion is suggested. No  pneumothorax. No acute osseous process.      Small left basilar atelectasis/infiltrate/effusion, follow-up suggested. Cardiomegaly.  This report was finalized on 2/13/2023 12:18 PM by Dr. Norm Arshad M.D.        Medical Decision Making:  ED Course as of 02/13/23 1848   Mon Feb 13, 2023   1210 EKG          EKG time: 1155  Rhythm/Rate: Normal sinus, rate 82  P waves and HI: Normal P, normal HI  QRS, axis: Narrow QRS, left axis  ST and T waves: T wave inversion V5 and V6.    Independently Interpreted by me  Not significantly changed compared to prior 7/23/2022   [TR]   1211 XR Chest 1 View  My independent interpretation of the chest x-ray is right-sided dialysis catheter.  No dense consolidation. [TR]   1312 CT Head Without Contrast  My independent interpretation of the CT of the head is no acute hemorrhage [TR]   1322 Discussing with Dr. Rayo with radiology.  CT head shows nothing acute but there are chronic changes more advanced than expected for her age [TR]   1349 I discussed the case with MD Pao with [facility] at this time regarding the patient.  I discussed work-up, results, concerns.  I discussed the consulting provider's desire for tele admit.   [DC]   1414 I discussed the case with MD Eladio with Nephrology at this time regarding the patient.  I discussed work-up, results, concerns.  I discussed the consulting provider's desire for consulting on the patient once admitted.    [DC]      ED Course User Index  [DC] Anselmo Reyez PA  [TR] Anthony Hawthorne MD       The patient's blood pressure is significantly elevated.  She has not had her blood pressure medications this morning.  She does have shortness of breath and a headache.  Plan to give her her normal medications to bring her blood pressure down over time.  We will give her a dose of IV hydralazine and reevaluate.  I will check chemistries, blood counts,  troponin, BNP.  We will obtain chest x-ray for her shortness of breath.  We will CT her head given her headache and hypertension.  We will rule out CHF, intracranial hemorrhage, renal failure, and others.    Procedures:  Critical Care  Performed by: Anthony Hawthorne MD  Authorized by: Anthony Hawthorne MD     Critical care provider statement:     Critical care time (minutes):  40    Critical care time was exclusive of:  Separately billable procedures and treating other patients    Critical care was necessary to treat or prevent imminent or life-threatening deterioration of the following conditions:  Cardiac failure and circulatory failure    Critical care was time spent personally by me on the following activities:  Development of treatment plan with patient or surrogate, discussions with consultants, evaluation of patient's response to treatment, examination of patient, obtaining history from patient or surrogate, ordering and performing treatments and interventions, ordering and review of laboratory studies, ordering and review of radiographic studies, pulse oximetry, re-evaluation of patient's condition and review of old charts          PPE: The patient wore a mask and I wore an N95 mask throughout the entire patient encounter.      The patient qualifies to receive the vaccine, but they have not yet received it.    Diagnosis  Final diagnoses:   Hypertensive emergency   Acute renal failure superimposed on chronic kidney disease, unspecified CKD stage, unspecified acute renal failure type (HCC)   Elevated troponin       Note Disclaimer: At [facility], we believe that sharing information builds trust and better relationships. You are receiving this note because you recently visited [facility]. It is possible you will see health information before a provider has talked with you about it. This kind of information can be easy to misunderstand. To help you fully understand what it means for your health, we urge  you to discuss this note with your provider.     Anthony Hawthorne MD  02/13/23 5240

## 2023-02-13 NOTE — TELEPHONE ENCOUNTER
----- Message from Drew Kaminski MD sent at 2/13/2023 10:05 AM EST -----  Get her records from her last  hospitalization:  - discharge summary, h and P, all consults, all xray results. Thx.

## 2023-02-13 NOTE — ED NOTES
Pt also reports a headache with her hypertension. Pt states in the past she has had seizures when similar episodes have occurred.

## 2023-02-13 NOTE — ED PROVIDER NOTES
EMERGENCY DEPARTMENT ENCOUNTER    Room Number:  01/01  Date of encounter:  2/13/2023  PCP: Bobby Corey MD  Historian: Patient  Full history not obtainable due to: None    HPI:  Chief Complaint: Hypertension    Context: Dee Herrera is a 32 y.o. female with a PMH significant for lupus, TTP syndrome, hypervolemia, pericardial effusion, pancreatitis who presents to the ED c/o headache.  The patient has undergone several recent medication changes with her blood pressure control and states that she woke up this morning with a headache and elevated blood pressure readings.  She was at the GI office and was sent here for further evaluation when they found her blood sugar to be markedly elevated.  She denies chest pain, shortness of breath, dizziness, syncope.  She admits to nausea and vomiting which is at baseline for her daily symptoms.  She reports compliance with her medication regimen.      MEDICAL RECORD REVIEW:    Upon review of the medical record it appears the patient's most recent evaluation was with gastroenterology earlier today for chronic pancreatitis and duodenitis    PAST MEDICAL HISTORY    Active Ambulatory Problems     Diagnosis Date Noted   • Vitamin D deficiency 09/27/2021   • Iron deficiency anemia 09/27/2021   • Morning stiffness of joints 04/21/2022   • Iron deficiency anemia, unspecified iron deficiency anemia type 07/11/2022   • Thrombocytopenia ([facility]) 07/20/2022   • Acute renal failure (ARF) ([facility]) 07/20/2022   • Hypertension secondary to other renal disorders 07/20/2022   • Severe anemia 07/20/2022   • Hypoalbuminemia 07/20/2022   • Elevated troponin 07/20/2022   • Volume overload 07/20/2022   • Ear drainage right 07/20/2022   • Hypertensive urgency 07/20/2022   • T.T.P. 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     Resolved Ambulatory Problems     Diagnosis Date Noted   • Anemia, unspecified type 04/21/2022   • Nausea and vomiting 07/20/2022   • Combined systolic and diastolic congestive heart failure (HCC) 12/08/2022     Past Medical History:   Diagnosis Date   • History of anemia    • History of transfusion    • Hypertension    • Other specified nutritional anemias    • Seizures (HCC)          PAST SURGICAL HISTORY  Past Surgical History:   Procedure Laterality Date   • INSERTION HEMODIALYSIS CATHETER N/A 07/26/2022    Procedure: RIGHT TUNNELED DIALYSIS CATHETER PLACEMENT;  Surgeon: Diandra Adhikari MD;  Location: [facility];  Service: Vascular;  Laterality: N/A;   • NO PAST SURGERIES     • TONSILLECTOMY           FAMILY HISTORY  Family History   Problem Relation Age of Onset   • Autoimmune disease Mother    • Anemia Mother    • Diabetes Sister    • Anemia Brother    • Diabetes Maternal Grandmother    • Hypertension Maternal Grandmother    • Cancer Maternal Grandmother    • Sickle cell anemia Cousin    • Malig Hyperthermia Neg Hx          SOCIAL HISTORY  Social History     Socioeconomic History   • Marital status: Single   Tobacco Use   • Smoking status: Never   • Smokeless tobacco: Never   Vaping Use   • Vaping Use: Never used   Substance and Sexual Activity   • Alcohol use: Not Currently     Comment: social   • Drug use: Yes     Types: Marijuana   • Sexual activity: Not Currently     Partners: Male     Birth control/protection: None         ALLERGIES  Patient has no known allergies.        REVIEW OF SYSTEMS    All systems reviewed and marked as negative except as listed in HPI     PHYSICAL EXAM    I have  reviewed the triage vital signs and nursing notes.    ED Triage Vitals   Temp Heart Rate Resp BP SpO2   02/13/23 1044 02/13/23 1044 02/13/23 1044 02/13/23 1058 02/13/23 1044   98.9 °F (37.2 °C) 85 16 (!) 205/158 100 %      Temp src Heart Rate Source Patient Position BP Location FiO2 (%)   02/13/23 1044 02/13/23 1044 -- -- --   Tympanic Monitor          Physical Exam  Constitutional:       General: She is not in acute distress.     Appearance: She is well-developed.   HENT:      Head: Normocephalic and atraumatic.   Eyes:      General: No scleral icterus.     Conjunctiva/sclera: Conjunctivae normal.   Neck:      Trachea: No tracheal deviation.   Cardiovascular:      Rate and Rhythm: Normal rate and regular rhythm.   Pulmonary:      Effort: Pulmonary effort is normal.      Breath sounds: Normal breath sounds.   Abdominal:      Palpations: Abdomen is soft.      Tenderness: There is no abdominal tenderness.   Musculoskeletal:         General: No deformity.      Cervical back: Normal range of motion.   Lymphadenopathy:      Cervical: No cervical adenopathy.   Skin:     General: Skin is warm and dry.   Neurological:      Mental Status: She is alert and oriented to person, place, and time.   Psychiatric:         Behavior: Behavior normal.         Vital signs and nursing notes reviewed.            LAB RESULTS  Recent Results (from the past 24 hour(s))   Comprehensive Metabolic Panel    Collection Time: 02/13/23 11:48 AM    Specimen: Blood   Result Value Ref Range    Glucose 89 65 - 99 mg/dL    BUN 14 6 - 20 mg/dL    Creatinine 6.91 (H) 0.57 - 1.00 mg/dL    Sodium 135 (L) 136 - 145 mmol/L    Potassium 4.7 3.5 - 5.2 mmol/L    Chloride 96 (L) 98 - 107 mmol/L    CO2 30.0 (H) 22.0 - 29.0 mmol/L    Calcium 9.1 8.6 - 10.5 mg/dL    Total Protein 6.3 6.0 - 8.5 g/dL    Albumin 3.7 3.5 - 5.2 g/dL    ALT (SGPT) 9 1 - 33 U/L    AST (SGOT) 42 (H) 1 - 32 U/L    Alkaline Phosphatase 41 39 - 117 U/L    Total Bilirubin 1.2 0.0 - 1.2 mg/dL     Globulin 2.6 gm/dL    A/G Ratio 1.4 g/dL    BUN/Creatinine Ratio 2.0 (L) 7.0 - 25.0    Anion Gap 9.0 5.0 - 15.0 mmol/L    eGFR 7.7 (L) >60.0 mL/min/1.73   Single High Sensitivity Troponin T    Collection Time: 02/13/23 11:48 AM    Specimen: Blood   Result Value Ref Range    HS Troponin T 66 (C) <10 ng/L   CBC Auto Differential    Collection Time: 02/13/23 11:48 AM    Specimen: Blood   Result Value Ref Range    WBC 2.46 (L) 3.40 - 10.80 10*3/mm3    RBC 4.31 3.77 - 5.28 10*6/mm3    Hemoglobin 10.4 (L) 12.0 - 15.9 g/dL    Hematocrit 35.0 34.0 - 46.6 %    MCV 81.2 79.0 - 97.0 fL    MCH 24.1 (L) 26.6 - 33.0 pg    MCHC 29.7 (L) 31.5 - 35.7 g/dL    RDW 19.7 (H) 12.3 - 15.4 %    RDW-SD 55.4 (H) 37.0 - 54.0 fl    Platelets 26 (C) 140 - 450 10*3/mm3    Neutrophil % 50.8 42.7 - 76.0 %    Lymphocyte % 29.7 19.6 - 45.3 %    Monocyte % 18.3 (H) 5.0 - 12.0 %    Eosinophil % 0.0 (L) 0.3 - 6.2 %    Basophil % 0.4 0.0 - 1.5 %    Neutrophils, Absolute 1.25 (L) 1.70 - 7.00 10*3/mm3    Lymphocytes, Absolute 0.73 0.70 - 3.10 10*3/mm3    Monocytes, Absolute 0.45 0.10 - 0.90 10*3/mm3    Eosinophils, Absolute 0.00 0.00 - 0.40 10*3/mm3    Basophils, Absolute 0.01 0.00 - 0.20 10*3/mm3   BNP    Collection Time: 02/13/23 11:48 AM    Specimen: Blood   Result Value Ref Range    proBNP >70,000.0 (H) 0.0 - 450.0 pg/mL   Scan Slide    Collection Time: 02/13/23 11:48 AM    Specimen: Blood   Result Value Ref Range    Dacrocytes Mod/2+ None Seen    Hypochromia Mod/2+ None Seen    Poikilocytes Large/3+ None Seen    Schistocytes Slight/1+ None Seen   ECG 12 Lead Dyspnea    Collection Time: 02/13/23 11:55 AM   Result Value Ref Range    QT Interval 414 ms       Ordered the above labs and independently reviewed the results.        RADIOLOGY  CT Head Without Contrast    Result Date: 2/13/2023  CT OF THE BRAIN WITHOUT CONTRAST 02/13/2023  HISTORY: Headache. Hypertension.  TECHNIQUE: Axial images were obtained through the brain without intravenous contrast.   FINDINGS:  There is mild to moderate diffuse atrophy. There is some mild decreased attenuation of the periventricular white matter particularly in the right superior parietal region which may represent small vessel ischemic disease. There is no evidence of acute infarction, hemorrhage midline shift or mass effect.  There is mild mucosal thickening in the ethmoid sinuses.      1. Atrophy and minimal changes of small vessel ischemic disease. These findings are somewhat advanced for a patient of this age. 2. No acute intracranial process. 3. Mild ethmoid sinusitis. 4. Findings were discussed with Dr. Hawthorne.   Radiation dose reduction techniques were utilized, including automated exposure control and exposure modulation based on body size.       XR Chest 1 View    Result Date: 2/13/2023  XR CHEST 1 VW-  HISTORY: Female who is 32 years-old,  hypertension, short of breath  TECHNIQUE: Frontal view of the chest  COMPARISON: 07/26/2022  FINDINGS: Right-sided central venous catheter appears stable. The heart is enlarged. Pulmonary vasculature is unremarkable. Obscuration of the left hemidiaphragm suggests small left basilar atelectasis/infiltrate/effusion is suggested. No  pneumothorax. No acute osseous process.      Small left basilar atelectasis/infiltrate/effusion, follow-up suggested. Cardiomegaly.  This report was finalized on 2/13/2023 12:18 PM by Dr. Norm Arshad M.D.        I ordered the above noted radiological studies. Independently reviewed by me and discussed with radiologist.  See dictation above for official radiology interpretation.      PROCEDURES    Procedures        MEDICATIONS GIVEN IN ER    Medications   sodium chloride 0.9 % flush 10 mL (has no administration in time range)   carvedilol (COREG) tablet 25 mg (25 mg Oral Given 2/13/23 1151)   hydrALAZINE (APRESOLINE) injection 20 mg (20 mg Intravenous Given 2/13/23 1150)   NIFEdipine XL (PROCARDIA XL) 24 hr tablet 60 mg (60 mg Oral Given 2/13/23  1333)         PROGRESS, DATA ANALYSIS, CONSULTS, AND MEDICAL DECISION MAKING    All labs have been independently interpreted by me.  All radiology studies have been interpreted by me.  Discussion below represents my analysis of pertinent findings related to patient's condition, differential diagnosis, treatment plan and final disposition.    Presentation consistent with hypertensive emergency in light of elevated troponin with markedly elevated blood pressure.  Plan to admit to the hospital to see nephrology in regards to her renal failure and hypertension.    - Chronic or social conditions impacting care: Poor medical compliance.  Chronic kidney disease, lupus.      DIFFERENTIAL DIAGNOSIS INCLUDE BUT NOT LIMITED TO:     Intracranial bleeding, hypertensive emergency, hypertensive urgency, EVITA, chronic kidney disease, NSTEMI      Orders placed during this visit:  Orders Placed This Encounter   Procedures   • Critical Care   • CT Head Without Contrast   • XR Chest 1 View   • Comprehensive Metabolic Panel   • Single High Sensitivity Troponin T   • CBC Auto Differential   • BNP   • Scan Slide   • Monitor Blood Pressure   • Cardiac Monitoring   • Pulse Oximetry, Continuous   • [facility] (on-call MD unless specified) Details   • Nephrology (on -call MD unless specified)   • ECG 12 Lead Dyspnea   • Insert Peripheral IV   • Inpatient Admission   • CBC & Differential         ED Course as of 02/13/23 1350   Mon Feb 13, 2023   1210 EKG          EKG time: 1155  Rhythm/Rate: Normal sinus, rate 82  P waves and AR: Normal P, normal AR  QRS, axis: Narrow QRS, left axis  ST and T waves: T wave inversion V5 and V6.    Independently Interpreted by me  Not significantly changed compared to prior 7/23/2022   [TR]   1211 XR Chest 1 View  My independent interpretation of the chest x-ray is right-sided dialysis catheter.  No dense consolidation. [TR]   1312 CT Head Without Contrast  My independent interpretation of the CT of the head is no acute  hemorrhage [TR]   1322 Discussing with Dr. Rayo with radiology.  CT head shows nothing acute but there are chronic changes more advanced than expected for her age [TR]   1349 I discussed the case with MD Pao with [facility] at this time regarding the patient.  I discussed work-up, results, concerns.  I discussed the consulting provider's desire for tele admit.   [DC]      ED Course User Index  [DC] Anselmo Reyez PA  [TR] Anthony Hawthorne MD       AS OF 13:50 EST VITALS:    BP - (!) 168/122  HR - 79  TEMP - 98.9 °F (37.2 °C) (Tympanic)  02 SATS - 99%    1351 I rechecked the patient.  I discussed the patient's labs, radiology findings (including all incidental findings), diagnosis, and plan for admission.  All questions answered.        DIAGNOSIS  Final diagnoses:   Hypertensive emergency   Acute renal failure superimposed on chronic kidney disease, unspecified CKD stage, unspecified acute renal failure type (HCC)   Elevated troponin         DISPOSITION  Admit    Pt masked in first look. I wore a surgical mask throughout my encounters with the pt. I performed hand hygiene on entry into the pt room and upon exit.     Dictated utilizing Dragon dictation     Note Disclaimer: At [facility], we believe that sharing information builds trust and better relationships. You are receiving this note because you recently visited [facility]. It is possible you will see health information before a provider has talked with you about it. This kind of information can be easy to misunderstand. To help you fully understand what it means for your health, we urge you to discuss this note with your provider.      Anselmo Reyez PA  02/13/23 1351

## 2023-02-13 NOTE — TELEPHONE ENCOUNTER
Pt was seen at [facility] on 10/6/22-10/12/22, called [phone],  left requesting the records be faxed to [phone]

## 2023-02-14 ENCOUNTER — APPOINTMENT (OUTPATIENT)
Dept: CT IMAGING | Facility: HOSPITAL | Age: 33
DRG: 304 | End: 2023-02-14
Payer: MEDICAID

## 2023-02-14 ENCOUNTER — APPOINTMENT (OUTPATIENT)
Dept: CARDIOLOGY | Facility: HOSPITAL | Age: 33
DRG: 304 | End: 2023-02-14
Payer: MEDICAID

## 2023-02-14 PROBLEM — A04.72 C. DIFFICILE COLITIS: Status: ACTIVE | Noted: 2023-02-14

## 2023-02-14 PROBLEM — D61.818 PANCYTOPENIA: Status: ACTIVE | Noted: 2022-07-20

## 2023-02-14 PROBLEM — I16.0 HYPERTENSIVE URGENCY: Status: ACTIVE | Noted: 2023-02-14

## 2023-02-14 PROBLEM — R79.89 ELEVATED LIVER FUNCTION TESTS: Status: ACTIVE | Noted: 2023-02-14

## 2023-02-14 PROBLEM — G40.909 SEIZURE DISORDER: Status: ACTIVE | Noted: 2023-02-14

## 2023-02-14 PROBLEM — Z99.2 HEMODIALYSIS STATUS: Status: ACTIVE | Noted: 2023-02-14

## 2023-02-14 PROBLEM — I31.39 PERICARDIAL EFFUSION: Status: ACTIVE | Noted: 2023-02-14

## 2023-02-14 PROBLEM — N18.6 ESRD (END STAGE RENAL DISEASE) (HCC): Status: ACTIVE | Noted: 2023-02-14

## 2023-02-14 LAB
ADV 40+41 DNA STL QL NAA+NON-PROBE: NOT DETECTED
AORTIC DIMENSIONLESS INDEX: 0.9 (DI)
ASTRO TYP 1-8 RNA STL QL NAA+NON-PROBE: NOT DETECTED
BH CV ECHO MEAS - AO MAX PG: 7.5 MMHG
BH CV ECHO MEAS - AO MEAN PG: 4.5 MMHG
BH CV ECHO MEAS - AO V2 MAX: 137.3 CM/SEC
BH CV ECHO MEAS - AO V2 VTI: 24.5 CM
BH CV ECHO MEAS - AVA(I,D): 3.5 CM2
BH CV ECHO MEAS - EDV(CUBED): 85.6 ML
BH CV ECHO MEAS - EDV(MOD-SP2): 121 ML
BH CV ECHO MEAS - EDV(MOD-SP4): 124 ML
BH CV ECHO MEAS - EF(MOD-BP): 59.1 %
BH CV ECHO MEAS - EF(MOD-SP2): 57.9 %
BH CV ECHO MEAS - EF(MOD-SP4): 60.5 %
BH CV ECHO MEAS - ESV(CUBED): 32 ML
BH CV ECHO MEAS - ESV(MOD-SP2): 51 ML
BH CV ECHO MEAS - ESV(MOD-SP4): 49 ML
BH CV ECHO MEAS - FS: 28 %
BH CV ECHO MEAS - IVS/LVPW: 0.95 CM
BH CV ECHO MEAS - IVSD: 1.86 CM
BH CV ECHO MEAS - LAT PEAK E' VEL: 5.1 CM/SEC
BH CV ECHO MEAS - LV MASS(C)D: 393.9 GRAMS
BH CV ECHO MEAS - LV MAX PG: 6.4 MMHG
BH CV ECHO MEAS - LV MEAN PG: 3.4 MMHG
BH CV ECHO MEAS - LV V1 MAX: 126.6 CM/SEC
BH CV ECHO MEAS - LV V1 VTI: 21.2 CM
BH CV ECHO MEAS - LVIDD: 4.4 CM
BH CV ECHO MEAS - LVIDS: 3.2 CM
BH CV ECHO MEAS - LVOT AREA: 4.1 CM2
BH CV ECHO MEAS - LVOT DIAM: 2.28 CM
BH CV ECHO MEAS - LVPWD: 1.97 CM
BH CV ECHO MEAS - MED PEAK E' VEL: 5.4 CM/SEC
BH CV ECHO MEAS - MV A DUR: 0.11 SEC
BH CV ECHO MEAS - MV A MAX VEL: 62.9 CM/SEC
BH CV ECHO MEAS - MV DEC SLOPE: 352.5 CM/SEC2
BH CV ECHO MEAS - MV DEC TIME: 203 MSEC
BH CV ECHO MEAS - MV E MAX VEL: 76 CM/SEC
BH CV ECHO MEAS - MV E/A: 1.21
BH CV ECHO MEAS - MV MAX PG: 2.05 MMHG
BH CV ECHO MEAS - MV MEAN PG: 1.04 MMHG
BH CV ECHO MEAS - MV P1/2T: 63.2 MSEC
BH CV ECHO MEAS - MV V2 VTI: 20.9 CM
BH CV ECHO MEAS - MVA(P1/2T): 3.5 CM2
BH CV ECHO MEAS - MVA(VTI): 4.1 CM2
BH CV ECHO MEAS - PA ACC TIME: 0.13 SEC
BH CV ECHO MEAS - PA PR(ACCEL): 22 MMHG
BH CV ECHO MEAS - PA V2 MAX: 119.3 CM/SEC
BH CV ECHO MEAS - RAP SYSTOLE: 3 MMHG
BH CV ECHO MEAS - RV MAX PG: 5.1 MMHG
BH CV ECHO MEAS - RV V1 MAX: 113.2 CM/SEC
BH CV ECHO MEAS - RV V1 VTI: 23.2 CM
BH CV ECHO MEAS - SV(LVOT): 86.5 ML
BH CV ECHO MEAS - SV(MOD-SP2): 70 ML
BH CV ECHO MEAS - SV(MOD-SP4): 75 ML
BH CV ECHO MEAS - TAPSE (>1.6): 2.4 CM
BH CV ECHO MEASUREMENTS AVERAGE E/E' RATIO: 14.48
BH CV VAS BP RIGHT ARM: NORMAL MMHG
BH CV XLRA - TDI S': 12.7 CM/SEC
C CAYETANENSIS DNA STL QL NAA+NON-PROBE: NOT DETECTED
C COLI+JEJ+UPSA DNA STL QL NAA+NON-PROBE: NOT DETECTED
C DIFF GDH + TOXINS A+B STL QL IA.RAPID: NEGATIVE
C DIFF TOX GENS STL QL NAA+PROBE: POSITIVE
CRYPTOSP DNA STL QL NAA+NON-PROBE: NOT DETECTED
DEPRECATED RDW RBC AUTO: 52.9 FL (ref 37–54)
E HISTOLYT DNA STL QL NAA+NON-PROBE: NOT DETECTED
EAEC PAA PLAS AGGR+AATA ST NAA+NON-PRB: NOT DETECTED
EC STX1+STX2 GENES STL QL NAA+NON-PROBE: NOT DETECTED
EPEC EAE GENE STL QL NAA+NON-PROBE: NOT DETECTED
ERYTHROCYTE [DISTWIDTH] IN BLOOD BY AUTOMATED COUNT: 19.9 % (ref 12.3–15.4)
ETEC LTA+ST1A+ST1B TOX ST NAA+NON-PROBE: NOT DETECTED
G LAMBLIA DNA STL QL NAA+NON-PROBE: NOT DETECTED
GEN 5 2HR TROPONIN T REFLEX: 58 NG/L
HCG SERPL QL: NEGATIVE
HCT VFR BLD AUTO: 29.5 % (ref 34–46.6)
HGB BLD-MCNC: 9 G/DL (ref 12–15.9)
LIPASE SERPL-CCNC: 22 U/L (ref 13–60)
MAXIMAL PREDICTED HEART RATE: 190 BPM
MCH RBC QN AUTO: 23.8 PG (ref 26.6–33)
MCHC RBC AUTO-ENTMCNC: 30.5 G/DL (ref 31.5–35.7)
MCV RBC AUTO: 78 FL (ref 79–97)
NOROVIRUS GI+II RNA STL QL NAA+NON-PROBE: NOT DETECTED
P SHIGELLOIDES DNA STL QL NAA+NON-PROBE: NOT DETECTED
PLATELET # BLD AUTO: 27 10*3/MM3 (ref 140–450)
PLATELET # BLD AUTO: 31 10*3/MM3 (ref 140–450)
PLATELETS.RETICULATED NFR BLD AUTO: 24.9 % (ref 0.9–6.5)
RBC # BLD AUTO: 3.78 10*6/MM3 (ref 3.77–5.28)
RVA RNA STL QL NAA+NON-PROBE: NOT DETECTED
S ENT+BONG DNA STL QL NAA+NON-PROBE: NOT DETECTED
SAPO I+II+IV+V RNA STL QL NAA+NON-PROBE: NOT DETECTED
SHIGELLA SP+EIEC IPAH ST NAA+NON-PROBE: NOT DETECTED
STRESS TARGET HR: 162 BPM
TROPONIN T DELTA: -10 NG/L
V CHOL+PARA+VUL DNA STL QL NAA+NON-PROBE: NOT DETECTED
V CHOLERAE DNA STL QL NAA+NON-PROBE: NOT DETECTED
WBC NRBC COR # BLD: 2.51 10*3/MM3 (ref 3.4–10.8)
Y ENTEROCOL DNA STL QL NAA+NON-PROBE: NOT DETECTED

## 2023-02-14 PROCEDURE — 63710000001 MYCOPHENOLATE MOFETIL PER 250 MG: Performed by: INTERNAL MEDICINE

## 2023-02-14 PROCEDURE — 87449 NOS EACH ORGANISM AG IA: CPT | Performed by: INTERNAL MEDICINE

## 2023-02-14 PROCEDURE — 5A1D70Z PERFORMANCE OF URINARY FILTRATION, INTERMITTENT, LESS THAN 6 HOURS PER DAY: ICD-10-PCS | Performed by: STUDENT IN AN ORGANIZED HEALTH CARE EDUCATION/TRAINING PROGRAM

## 2023-02-14 PROCEDURE — 93306 TTE W/DOPPLER COMPLETE: CPT

## 2023-02-14 PROCEDURE — 85055 RETICULATED PLATELET ASSAY: CPT | Performed by: INTERNAL MEDICINE

## 2023-02-14 PROCEDURE — 87493 C DIFF AMPLIFIED PROBE: CPT | Performed by: INTERNAL MEDICINE

## 2023-02-14 PROCEDURE — 87507 IADNA-DNA/RNA PROBE TQ 12-25: CPT | Performed by: INTERNAL MEDICINE

## 2023-02-14 PROCEDURE — 74177 CT ABD & PELVIS W/CONTRAST: CPT

## 2023-02-14 PROCEDURE — 99222 1ST HOSP IP/OBS MODERATE 55: CPT | Performed by: INTERNAL MEDICINE

## 2023-02-14 PROCEDURE — 25010000002 HEPARIN (PORCINE) PER 1000 UNITS: Performed by: INTERNAL MEDICINE

## 2023-02-14 PROCEDURE — 84703 CHORIONIC GONADOTROPIN ASSAY: CPT | Performed by: INTERNAL MEDICINE

## 2023-02-14 PROCEDURE — 85027 COMPLETE CBC AUTOMATED: CPT | Performed by: INTERNAL MEDICINE

## 2023-02-14 PROCEDURE — 84484 ASSAY OF TROPONIN QUANT: CPT | Performed by: INTERNAL MEDICINE

## 2023-02-14 PROCEDURE — 83690 ASSAY OF LIPASE: CPT | Performed by: INTERNAL MEDICINE

## 2023-02-14 PROCEDURE — 63710000001 PREDNISONE PER 1 MG: Performed by: INTERNAL MEDICINE

## 2023-02-14 PROCEDURE — 93306 TTE W/DOPPLER COMPLETE: CPT | Performed by: INTERNAL MEDICINE

## 2023-02-14 PROCEDURE — 25010000002 IOPAMIDOL 61 % SOLUTION: Performed by: INTERNAL MEDICINE

## 2023-02-14 RX ORDER — PREDNISONE 10 MG/1
10 TABLET ORAL DAILY
Status: DISCONTINUED | OUTPATIENT
Start: 2023-02-14 | End: 2023-02-18 | Stop reason: HOSPADM

## 2023-02-14 RX ORDER — MINOXIDIL 10 MG/1
20 TABLET ORAL EVERY 12 HOURS SCHEDULED
Status: DISCONTINUED | OUTPATIENT
Start: 2023-02-14 | End: 2023-02-15

## 2023-02-14 RX ORDER — VANCOMYCIN HYDROCHLORIDE 125 MG/1
125 CAPSULE ORAL EVERY 6 HOURS SCHEDULED
Status: DISCONTINUED | OUTPATIENT
Start: 2023-02-14 | End: 2023-02-18 | Stop reason: HOSPADM

## 2023-02-14 RX ORDER — FAMOTIDINE 20 MG/1
20 TABLET, FILM COATED ORAL DAILY
Status: DISCONTINUED | OUTPATIENT
Start: 2023-02-15 | End: 2023-02-18 | Stop reason: HOSPADM

## 2023-02-14 RX ORDER — HYDRALAZINE HYDROCHLORIDE 20 MG/ML
10 INJECTION INTRAMUSCULAR; INTRAVENOUS EVERY 6 HOURS PRN
Status: DISCONTINUED | OUTPATIENT
Start: 2023-02-14 | End: 2023-02-18 | Stop reason: HOSPADM

## 2023-02-14 RX ORDER — HEPARIN SODIUM 1000 [USP'U]/ML
4000 INJECTION, SOLUTION INTRAVENOUS; SUBCUTANEOUS AS NEEDED
Status: DISCONTINUED | OUTPATIENT
Start: 2023-02-14 | End: 2023-02-18 | Stop reason: HOSPADM

## 2023-02-14 RX ADMIN — IOPAMIDOL 85 ML: 612 INJECTION, SOLUTION INTRAVENOUS at 14:35

## 2023-02-14 RX ADMIN — MYCOPHENOLATE MOFETIL 1000 MG: 500 TABLET ORAL at 09:28

## 2023-02-14 RX ADMIN — LACOSAMIDE 50 MG: 100 TABLET, FILM COATED ORAL at 21:41

## 2023-02-14 RX ADMIN — HYDROXYCHLOROQUINE SULFATE 200 MG: 200 TABLET, FILM COATED ORAL at 09:27

## 2023-02-14 RX ADMIN — PANTOPRAZOLE SODIUM 40 MG: 40 TABLET, DELAYED RELEASE ORAL at 06:32

## 2023-02-14 RX ADMIN — CARVEDILOL 25 MG: 25 TABLET, FILM COATED ORAL at 09:29

## 2023-02-14 RX ADMIN — NIFEDIPINE 60 MG: 60 TABLET, FILM COATED, EXTENDED RELEASE ORAL at 09:28

## 2023-02-14 RX ADMIN — PREDNISONE 10 MG: 10 TABLET ORAL at 09:29

## 2023-02-14 RX ADMIN — LACOSAMIDE 50 MG: 100 TABLET, FILM COATED ORAL at 09:27

## 2023-02-14 RX ADMIN — VANCOMYCIN HYDROCHLORIDE 125 MG: 125 CAPSULE ORAL at 17:09

## 2023-02-14 RX ADMIN — MYCOPHENOLATE MOFETIL 1000 MG: 500 TABLET ORAL at 21:42

## 2023-02-14 RX ADMIN — MINOXIDIL 10 MG: 10 TABLET ORAL at 09:28

## 2023-02-14 RX ADMIN — HEPARIN SODIUM 4000 UNITS: 1000 INJECTION INTRAVENOUS; SUBCUTANEOUS at 23:55

## 2023-02-14 RX ADMIN — NIFEDIPINE 60 MG: 60 TABLET, FILM COATED, EXTENDED RELEASE ORAL at 21:41

## 2023-02-14 NOTE — PROGRESS NOTES
Name: Dee Herrera ADMIT: 2023   : 1990  PCP: Bobby Corey MD    MRN: [id number] LOS: 1 days   AGE/SEX: 32 y.o. female  ROOM: [facility]     Subjective   Subjective   Patient reports abdominal pain that is diffuse associated with abdominal distention.  No nausea or vomiting.  Normal bowel habits without constipation/diarrhea/bleeding per rectum/melena.  No headaches.  Positive dizziness.  No seizures.  No focal neurological symptoms.  No chest pain.  No palpitation.  No shortness of breath.  No cough.  No wheeze.  No hemoptysis.    Review of Systems  .  Does not produce urine.     Objective   Objective   Vital Signs  Temp:  [97.7 °F (36.5 °C)-98.8 °F (37.1 °C)] 97.7 °F (36.5 °C)  Heart Rate:  [73-96] 87  Resp:  [16-18] 18  BP: (132-186)/(100-148) 132/100  SpO2:  [97 %-100 %] 100 %  on   ;   Device (Oxygen Therapy): room air    Intake/Output Summary (Last 24 hours) at 2023 1246  Last data filed at 2023 1100  Gross per 24 hour   Intake 480 ml   Output --   Net 480 ml     Body mass index is 20.1 kg/m².      23  1058 23  0445   Weight: 54.4 kg (120 lb) 54.8 kg (120 lb 13 oz)     Physical Exam  General.  Middle-aged female.  She is alert and oriented x3.  No apparent pain/distress/diaphoresis.  Normal mood and affect.  Eyes.  Pupils equal round and reactive.  Intact extraocular musculature.  No pallor or jaundice.    Oral cavity.  Moist mucous membrane.  Neck.  Supple.  No JVD.  No lymphadenopathy or thyromegaly.  Cardiovascular.  Regular rate and rhythm and grade 2 systolic murmur.  Chest.  Clear to auscultation bilaterally with no added sounds  Abdomen.  Soft lax.  Diffuse tenderness.  Mild distention.  Positive bowel sounds.  No guarding or rebound.  No organomegaly.  Extremities.  No clubbing/cyanosis/edema.  CNS.  No acute focal neurological deficits    Results Review:      Results from last 7 days   Lab Units 23  2208 23  1148   SODIUM mmol/L 134* 135*    POTASSIUM mmol/L 4.6 4.7   CHLORIDE mmol/L 96* 96*   CO2 mmol/L 30.0* 30.0*   BUN mg/dL 17 14   CREATININE mg/dL 7.53* 6.91*   GLUCOSE mg/dL 108* 89   CALCIUM mg/dL 8.7 9.1   AST (SGOT) U/L  --  42*   ALT (SGPT) U/L  --  9     Estimated Creatinine Clearance: 9.5 mL/min (A) (by C-G formula based on SCr of 7.53 mg/dL (H)).          Results from last 7 days   Lab Units 02/14/23  2355 02/13/23  2208 02/13/23  1148   HSTROP T ng/L 58* 68* 66*     Results from last 7 days   Lab Units 02/13/23  1148   PROBNP pg/mL >70,000.0*         Results from last 7 days   Lab Units 02/07/23  1356   MAGNESIUM mg/dL 1.9           Invalid input(s): LDLCALC  Results from last 7 days   Lab Units 02/14/23  0348 02/13/23  1148   WBC 10*3/mm3 2.51* 2.46*   HEMOGLOBIN g/dL 9.0* 10.4*   HEMATOCRIT % 29.5* 35.0   PLATELETS 10*3/mm3 27* 26*   MCV fL 78.0* 81.2   MCH pg 23.8* 24.1*   MCHC g/dL 30.5* 29.7*   RDW % 19.9* 19.7*   RDW-SD fl 52.9 55.4*   NEUTROPHIL % %  --  50.8   LYMPHOCYTE % %  --  29.7   MONOCYTES % %  --  18.3*   EOSINOPHIL % %  --  0.0*   BASOPHIL % %  --  0.4   NEUTROS ABS 10*3/mm3  --  1.25*   LYMPHS ABS 10*3/mm3  --  0.73   MONOS ABS 10*3/mm3  --  0.45   EOS ABS 10*3/mm3  --  0.00   BASOS ABS 10*3/mm3  --  0.01                                           Imaging:  Imaging Results (Last 24 Hours)     Procedure Component Value Units Date/Time    CT Head Without Contrast [[id number]] Collected: 02/13/23 1324     Updated: 02/13/23 1728    Narrative:      CT OF THE BRAIN WITHOUT CONTRAST 02/13/2023     HISTORY: Headache. Hypertension.     TECHNIQUE: Axial images were obtained through the brain without  intravenous contrast.     FINDINGS:  There is mild to moderate diffuse atrophy. There is some mild  decreased attenuation of the periventricular white matter particularly  in the right superior parietal region which may represent small vessel  ischemic disease. There is no evidence of acute infarction, hemorrhage  midline shift or mass  effect.     There is mild mucosal thickening in the ethmoid sinuses.       Impression:      1. Atrophy and minimal changes of small vessel ischemic disease. These  findings are somewhat advanced for a patient of this age.  2. No acute intracranial process.  3. Mild ethmoid sinusitis.  4. Findings were discussed with Dr. Hawthorne.        Radiation dose reduction techniques were utilized, including automated  exposure control and exposure modulation based on body size.     This report was finalized on 2/13/2023 5:24 PM by Dr. Clyde Rayo M.D.                I reviewed the patient's new clinical results / labs / tests / procedures      Assessment/Plan     Active Hospital Problems    Diagnosis  POA   • **Hypertensive urgency [I16.0]  Yes   • Pericardial effusion [I31.39]  Yes   • ESRD (end stage renal disease) ([facility]) [N18.6]  Yes   • Hemodialysis status ([facility]) [Z99.2]  Not Applicable   • Seizure disorder ([facility]) [G40.909]  Yes   • Elevated liver function tests [R79.89]  Yes   • Systemic lupus erythematosus ([facility]) [M32.9]  Yes   • Pancytopenia ([facility]) [D61.818]  Yes      Resolved Hospital Problems   No resolved problems to display.           · Hypertensive urgency in a patient with a history of hypertension and pericardial effusion.  Patient tells  me that she is taking her medications regularly.  At this time I will continue Coreg/Adalat and increase minoxidil.  She did not have to start Cardene drip and I will DC.  We will give as needed hydralazine IV.  Patient last echo on 2/3/2023 revealed a normal ejection fraction, left ventricular strain, left ventricular hypertrophy, grade 1 diastolic dysfunction, left atrial enlargement, significant pericardial effusion.  Troponin is elevated but declining.  Elevated proBNP.  CT scan of the brain with atrophy and small vessel disease with no acute event.  Chest x-ray with a small atelectasis versus effusion in the left lung base.  EKG with normal sinus rhythm, left atrial  enlargement, left anterior fascicular block, T wave inversion in the lateral leads with ST elevation in the anterior leads (old changes).  There is no evidence of angina or congestive heart failure.  I will repeat 2D echo to reevaluate the pericardial effusion.  Will monitor blood pressure closely.  Elevation of the proBNP and troponin is most likely secondary to renal failure.  · End-stage renal disease.  On hemodialysis T/TH/sat.  Patient appears to be euvolemic.  Nephrology is following.  For dialysis today.  · Abdominal pain/distention.  Will check CT scanning of the abdomen pelvis with IV contrast/lipase/pregnancy test.  Liver function test is mildly elevated but I think it is because of medications.  Will consult GI.  As they were evaluating her as an outpatient.  Awaiting C. difficile stool.  · SLE.  Continue Plaquenil and CellCept.  · Seizures.  Continue Vimpat.  Negative CNS examination.  · Pancytopenia.  Low hemoglobin is stable about the baseline between 9.3 and 10.  Leukopenia is stable around the baseline of 2-3.  Platelets worse than the baseline of .  Will check IPF and CT scan of the abdomen.  Consult hematology oncology.  Questionable secondary to medications.    Discussed my findings and plan of treatment with the patient/nurses at multidisciplinary rounds.        Lakisha Hunt MD  [facility]  02/14/23  12:46 EST

## 2023-02-14 NOTE — PROGRESS NOTES
[facility] Clinical Pharmacy Services: C. Difficile Medication Changes       Pharmacy has been consulted to look over Dee Herrera's profile to check patient's medications for changes due to C. Difficile diagnosis per Dr. Hunt's request.       Current C. Diff Regimen: vancomycin 125mg PO q6h    Assessment/Plan/Changes       1. Proton pump inhibitor: protonix will be changed to pepcid  2. Antiperistalic agents or stool softeners/laxatives: none       Thank you for allowing me to participate in your patient's care.  Please call pharmacy with any questions or concerns.     Kiki Mota, PharmD  Clinical Pharmacist

## 2023-02-14 NOTE — CONSULTS
NEPHROLOGY PROGRESS NOTE       [facility] Consult Note      Patient Name: Dee Herrera  : 1990  MRN: [id number]  Primary Care Physician:  Bobby Corey MD  Referring Physician: Laurence Cedeno MD  Date of admission: 2023    Subjective     Reason for Consult:  End-stage renal disease    HPI:   Dee Herrera is a 32 y.o. female with past medical history of systemic lupus erythematosus with history of lupus nephritis by percutaneous renal biopsy  that was treated with MMF and cyclophosphamide x1 , with chronic kidney disease that progressed to end-stage renal disease status post right IJ tunnel hemodialysis catheter undergoing hemodialysis on Tuesday, Thursday and Saturday schedule. that failed medical management with recurrent hospital admission for uncontrolled hypertension, press syndrome, chronic normocytic anemia, hyperphosphatemia, secondary hyperparathyroidism.  Patient has required multiple hospital admissions for uncontrolled hypertension for the last couple of months    Last week patient was admitted to [facility] for uncontrolled hypertension her medication treatment was optimized but patient is struggling with insurance coverage and has not a be able to afford her medication for the last 48 hours.    Patient presented to primary care provider and found to have a systolic blood pressure of 230's , patient was instructed to present to the emergency department for further management .    Patient presented to [facility] and then she was admitted.  Nephrology consultation been requested for the management        Review of Systems:   14 point review of systems is otherwise negative except for mentioned above on HPI    Personal History     Past Medical History:   Diagnosis Date   • History of anemia    • History of transfusion    • Hypertension    • Iron deficiency anemia 2021   • Lupus (systemic lupus erythematosus) (HCC)  07/30/2022   • Other specified nutritional anemias    • Seizures (HCC)    • Vitamin D deficiency 09/27/2021       Past Surgical History:   Procedure Laterality Date   • INSERTION HEMODIALYSIS CATHETER N/A 07/26/2022    Procedure: RIGHT TUNNELED DIALYSIS CATHETER PLACEMENT;  Surgeon: Diandra Adhikari MD;  Location: [facility] OR;  Service: Vascular;  Laterality: N/A;   • NO PAST SURGERIES     • TONSILLECTOMY         Family History: family history includes Anemia in her brother and mother; Autoimmune disease in her mother; Cancer in her maternal grandmother; Diabetes in her maternal grandmother and sister; Hypertension in her maternal grandmother; Sickle cell anemia in her cousin.    Social History:  reports that she has never smoked. She has never used smokeless tobacco. She reports that she does not currently use alcohol. She reports current drug use. Drug: Marijuana.    Home Medications:  Prior to Admission medications    Medication Sig Start Date End Date Taking? Authorizing Provider   carvedilol (Coreg) 25 MG tablet Take 1 tablet by mouth 2 (Two) Times a Day With Meals. 2/2/23   Bobby Corey MD   hydroxychloroquine (PLAQUENIL) 200 MG tablet 200 mg. 11/22/22   Ivet Manzano MD   lacosamide (VIMPAT) 50 MG tablet tablet 50 mg. 8/29/22   Ivet Manzano MD   Methoxy PEG-Epoetin Beta (MIRCERA IJ) 225 mcg Every 14 (Fourteen) Days. 1/17/23 1/16/24  Ivet Manzano MD   minoxidil (LONITEN) 10 MG tablet Take 1 tablet by mouth Daily. 11/2/22   Bobby Corey MD   mycophenolate (CELLCEPT) 500 MG tablet 1,000 mg. 11/22/22   Ivet Manzano MD   NIFEdipine CC (ADALAT CC) 30 MG 24 hr tablet Take  by mouth. 1/21/23   Ivet Manzano MD   NIFEdipine XL (PROCARDIA XL) 60 MG 24 hr tablet 60 mg. 11/22/22   Ivet Manzano MD   omeprazole (priLOSEC) 40 MG capsule Take 1 capsule by mouth Daily. 2/2/23   Bobby Corey MD   ondansetron ODT (ZOFRAN-ODT) 4 MG disintegrating tablet 4  mg. 11/21/22   ProviderIvet MD   predniSONE (DELTASONE) 10 MG tablet Take 2 tablets by mouth Daily. 9/27/22 9/27/23  ProviderIvet MD       Allergies:  No Known Allergies    Objective     Vitals:   Temp:  [98.2 °F (36.8 °C)-98.9 °F (37.2 °C)] 98.3 °F (36.8 °C)  Heart Rate:  [73-96] 80  Resp:  [16-18] 18  BP: (150-236)/(100-164) 150/100    Intake/Output Summary (Last 24 hours) at 2/14/2023 0603  Last data filed at 2/14/2023 0300  Gross per 24 hour   Intake 240 ml   Output --   Net 240 ml       Physical Exam:   Constitutional: Awake, alert, no acute distress.  HEENT: Sclera anicteric, no conjunctival injection  Neck: Supple, no thyromegaly, no lymphadenopathy, trachea at midline, no JVD  Respiratory: Clear to auscultation bilaterally, nonlabored respiration.  Right tunneled hemodialysis catheter in place  Cardiovascular: RRR,  systolic ejection murmur  Gastrointestinal: Positive bowel sounds, abdomen is soft, nontender and nondistended  : No palpable bladder  Musculoskeletal: No edema, no clubbing or cyanosis  Psychiatric: Appropriate affect, cooperative  Neurologic: Oriented x3, moving all extremities, normal speech and mental status  Skin: Warm and dry       Scheduled Meds:     carvedilol, 25 mg, Oral, Q12H  hydroxychloroquine, 200 mg, Oral, Q24H  lacosamide, 50 mg, Oral, Q12H  minoxidil, 10 mg, Oral, Q12H  mycophenolate, 1,000 mg, Oral, Q12H  NIFEdipine XL, 60 mg, Oral, BID  pantoprazole, 40 mg, Oral, Q AM  predniSONE, 20 mg, Oral, Daily      IV Meds:   niCARdipine, 5-15 mg/hr        Results Reviewed:   I have personally reviewed the results from the time of this admission to 2/14/2023 06:03 EST     Lab Results   Component Value Date    GLUCOSE 108 (H) 02/13/2023    CALCIUM 8.7 02/13/2023     (L) 02/13/2023    K 4.6 02/13/2023    CO2 30.0 (H) 02/13/2023    CL 96 (L) 02/13/2023    BUN 17 02/13/2023    CREATININE 7.53 (H) 02/13/2023    EGFRIFAFRI 107 06/25/2021    BCR 2.3 (L) 02/13/2023     ANIONGAP 8.0 02/13/2023      Lab Results   Component Value Date    MG 1.9 02/07/2023    PHOS 6.6 (H) 07/25/2022    ALBUMIN 3.7 02/13/2023           Assessment / Plan     ASSESSMENT:    -End Stage Renal Disease ( ESRD ) on Hemodialysis .s/p  RIJ TDC  functional .Continue to arrange hemodialysis treatment during his admission. Continue renal diet   -Chronic normocytic anemia. On Epogen protocol. We will continue to follow H&H closely    -Hyperphosphatemia. Continue binders  with meals, Continue renal diet. We will follow phosphorus to make further adjustments  -Secondary hyperparathyrodism . On calcitriol protocol at dialysis unit. will follow closely   -Controlled hypertension.  Continue current regimen carvedilol, hydralazine, minoxidil, nifedipine. We will continue to follow closely. Heart healthy diet .  Unfortunately the patient has been struggling with insurance coverage for the last 48 hours she has not been able to  her medications.   -Insurance coverage.  Patient would benefit from a care management evaluation.  Patient states that she was given a new prescription after recent discharge from [facility] but has unable to  her new medications due to unable to afford medications and lack of insurance coverage.  -Lupus nephritis with systemic lupus erythematosus on Plaquenil and prednisone      PLAN:  -We will arrange for hemodialysis during her admission on Tuesday, Thursday and Saturday schedule  -Resume home dose medications which include carvedilol, hydralazine, minoxidil and nifedipine  -Follow renal function closely  -Avoid nephrotoxins  -Adjust medications to GFR      Thank you for involving us in the care of Dee Herrera.  Please feel free to call with any questions.    Hair Mckeon MD  02/14/23  06:03 [facility]    [facility]  [phone]      Please note that portions of this note were completed with a voice recognition program.

## 2023-02-14 NOTE — H&P
HISTORY AND PHYSICAL   [facility]        Date of Admission: 2023  Patient Identification:  Name: Dee Herrera  Age: 32 y.o.  Sex: female  :  1990  MRN: [id number]                     Primary Care Physician: Bobby Corey MD    Chief Complaint:  32 year old female who presented to the emergency room with elevated blood pressure; she was sent by dr noland who was seeing her for abdominal pain and anemia; she denies chest pain; no dizziness; she has had a headache    History of Present Illness:   As above    Past Medical History:  Past Medical History:   Diagnosis Date   • History of anemia    • History of transfusion    • Hypertension    • Iron deficiency anemia 2021   • Lupus (systemic lupus erythematosus) (HCC) 2022   • Other specified nutritional anemias    • Seizures (HCC)    • Vitamin D deficiency 2021     Past Surgical History:  Past Surgical History:   Procedure Laterality Date   • INSERTION HEMODIALYSIS CATHETER N/A 2022    Procedure: RIGHT TUNNELED DIALYSIS CATHETER PLACEMENT;  Surgeon: Diandra Adhikari MD;  Location: [facility];  Service: Vascular;  Laterality: N/A;   • NO PAST SURGERIES     • TONSILLECTOMY        Home Meds:  Medications Prior to Admission   Medication Sig Dispense Refill Last Dose   • carvedilol (Coreg) 25 MG tablet Take 1 tablet by mouth 2 (Two) Times a Day With Meals. 60 tablet 3    • hydroxychloroquine (PLAQUENIL) 200 MG tablet 200 mg.      • lacosamide (VIMPAT) 50 MG tablet tablet 50 mg.      • Methoxy PEG-Epoetin Beta (MIRCERA IJ) 225 mcg Every 14 (Fourteen) Days.      • minoxidil (LONITEN) 10 MG tablet Take 1 tablet by mouth Daily. 90 tablet 1    • mycophenolate (CELLCEPT) 500 MG tablet 1,000 mg.      • NIFEdipine CC (ADALAT CC) 30 MG 24 hr tablet Take  by mouth.      • NIFEdipine XL (PROCARDIA XL) 60 MG 24 hr tablet 60 mg.      • omeprazole (priLOSEC) 40 MG capsule Take 1 capsule by mouth Daily. 90 capsule 1    •  ondansetron ODT (ZOFRAN-ODT) 4 MG disintegrating tablet 4 mg.      • predniSONE (DELTASONE) 10 MG tablet Take 2 tablets by mouth Daily. 60 tablet 11        Allergies:  No Known Allergies  Immunizations:  Immunization History   Administered Date(s) Administered   • HPV Quadrivalent 04/18/2008, 04/20/2009   • Influenza TIV (IM) 04/20/2009, 09/14/2009   • Meningococcal MCV4P (Menactra) 03/29/2007   • Tdap 06/10/2009     Social History:   Social History     Social History Narrative   • Not on file     Social History     Socioeconomic History   • Marital status: Single   Tobacco Use   • Smoking status: Never   • Smokeless tobacco: Never   Vaping Use   • Vaping Use: Never used   Substance and Sexual Activity   • Alcohol use: Not Currently     Comment: social   • Drug use: Yes     Types: Marijuana   • Sexual activity: Not Currently     Partners: Male     Birth control/protection: None       Family History:  Family History   Problem Relation Age of Onset   • Autoimmune disease Mother    • Anemia Mother    • Diabetes Sister    • Anemia Brother    • Diabetes Maternal Grandmother    • Hypertension Maternal Grandmother    • Cancer Maternal Grandmother    • Sickle cell anemia Cousin    • Malig Hyperthermia Neg Hx         Review of Systems  See history of present illness and past medical history.  Patient denies   dizziness, syncope, falls, trauma, change in vision, change in hearing, change in taste, changes in weight, changes in appetite, focal weakness, numbness, or paresthesia.  Patient denies chest pain, palpitations, dyspnea, orthopnea, PND, cough, sinus pressure, rhinorrhea, epistaxis, hemoptysis, nausea, vomiting,hematemesis, diarrhea, constipation or hematchezia.  Denies cold or heat intolerance, polydipsia, polyuria, polyphagia. Denies hematuria, pyuria, dysuria, hesitancy, frequency or urgency. Denies consumption of raw and under cooked meats foods or change in water source.  Denies fever, chills, sweats, night sweats.  " Denies missing any routine medications. Remainder of ROS is negative.    Objective:  T Max 24 hrs: Temp (24hrs), Av.6 °F (37 °C), Min:98.2 °F (36.8 °C), Max:98.9 °F (37.2 °C)    Vitals Ranges:   Temp:  [98.2 °F (36.8 °C)-98.9 °F (37.2 °C)] 98.6 °F (37 °C)  Heart Rate:  [74-96] 88  Resp:  [16-18] 18  BP: (151-236)/(109-164) 160/110      Exam:  BP (!) 160/110 (BP Location: Left arm, Patient Position: Lying)   Pulse 88   Temp 98.6 °F (37 °C) (Oral)   Resp 18   Ht 165.1 cm (65\")   Wt 54.4 kg (120 lb)   SpO2 100%   BMI 19.97 kg/m²     General Appearance:    Alert, cooperative, no distress, appears older than stated age and chronically ill   Head:    Normocephalic, without obvious abnormality, atraumatic   Eyes:    PERRL, conjunctivae/corneas clear, EOM's intact, both eyes   Ears:    Normal external ear canals, both ears   Nose:   Nares normal, septum midline, mucosa normal, no drainage    or sinus tenderness   Throat:   Lips, mucosa, and tongue normal   Neck:   Supple, symmetrical, trachea midline, no adenopathy;     thyroid:  no enlargement/tenderness/nodules; no carotid    bruit or JVD   Back:     Symmetric, no curvature, ROM normal, no CVA tenderness   Lungs:     Clear to auscultation bilaterally, respirations unlabored   Chest Wall:    No tenderness or deformity    Heart:    Regular rate and rhythm, S1 and S2 normal, no murmur, rub   or gallop   Abdomen:     Soft, nontender, bowel sounds active all four quadrants,     no masses, no hepatomegaly, no splenomegaly   Extremities:   Extremities normal, atraumatic, no cyanosis or edema   Pulses:   2+ and symmetric all extremities   Skin:   Skin color, texture, turgor normal, no rashes or lesions               .    Data Review:  Labs in chart were reviewed.  WBC   Date Value Ref Range Status   2023 2.46 (L) 3.40 - 10.80 10*3/mm3 Final     Hemoglobin   Date Value Ref Range Status   2023 10.4 (L) 12.0 - 15.9 g/dL Final     Hematocrit   Date Value Ref " Range Status   02/13/2023 35.0 34.0 - 46.6 % Final     Platelets   Date Value Ref Range Status   02/13/2023 26 (C) 140 - 450 10*3/mm3 Final     Sodium   Date Value Ref Range Status   02/13/2023 135 (L) 136 - 145 mmol/L Final     Potassium   Date Value Ref Range Status   02/13/2023 4.7 3.5 - 5.2 mmol/L Final     Comment:     Slight hemolysis detected by analyzer. Results may be affected.     Chloride   Date Value Ref Range Status   02/13/2023 96 (L) 98 - 107 mmol/L Final     CO2   Date Value Ref Range Status   02/13/2023 30.0 (H) 22.0 - 29.0 mmol/L Final     BUN   Date Value Ref Range Status   02/13/2023 14 6 - 20 mg/dL Final     Creatinine   Date Value Ref Range Status   02/13/2023 6.91 (H) 0.57 - 1.00 mg/dL Final     Glucose   Date Value Ref Range Status   02/13/2023 89 65 - 99 mg/dL Final     Calcium   Date Value Ref Range Status   02/13/2023 9.1 8.6 - 10.5 mg/dL Final     AST (SGOT)   Date Value Ref Range Status   02/13/2023 42 (H) 1 - 32 U/L Final     Comment:     Slight hemolysis detected by analyzer. Results may be affected.     ALT (SGPT)   Date Value Ref Range Status   02/13/2023 9 1 - 33 U/L Final     Alkaline Phosphatase   Date Value Ref Range Status   02/13/2023 41 39 - 117 U/L Final     No results found for: APTT, INR             Imaging Results (All)     Procedure Component Value Units Date/Time    CT Head Without Contrast [[id number]] Collected: 02/13/23 1324     Updated: 02/13/23 1728    Narrative:      CT OF THE BRAIN WITHOUT CONTRAST 02/13/2023     HISTORY: Headache. Hypertension.     TECHNIQUE: Axial images were obtained through the brain without  intravenous contrast.     FINDINGS:  There is mild to moderate diffuse atrophy. There is some mild  decreased attenuation of the periventricular white matter particularly  in the right superior parietal region which may represent small vessel  ischemic disease. There is no evidence of acute infarction, hemorrhage  midline shift or mass effect.     There is  mild mucosal thickening in the ethmoid sinuses.       Impression:      1. Atrophy and minimal changes of small vessel ischemic disease. These  findings are somewhat advanced for a patient of this age.  2. No acute intracranial process.  3. Mild ethmoid sinusitis.  4. Findings were discussed with Dr. Hawthorne.        Radiation dose reduction techniques were utilized, including automated  exposure control and exposure modulation based on body size.     This report was finalized on 2/13/2023 5:24 PM by Dr. Clyde Rayo M.D.       XR Chest 1 View [[id number]] Collected: 02/13/23 1216     Updated: 02/13/23 1221    Narrative:      XR CHEST 1 VW-     HISTORY: Female who is 32 years-old,  hypertension, short of breath     TECHNIQUE: Frontal view of the chest     COMPARISON: 07/26/2022     FINDINGS: Right-sided central venous catheter appears stable. The heart  is enlarged. Pulmonary vasculature is unremarkable. Obscuration of the  left hemidiaphragm suggests small left basilar  atelectasis/infiltrate/effusion is suggested. No  pneumothorax. No acute  osseous process.       Impression:      Small left basilar atelectasis/infiltrate/effusion,  follow-up suggested. Cardiomegaly.     This report was finalized on 2/13/2023 12:18 PM by Dr. Norm Arshad M.D.               Assessment:  Active Hospital Problems    Diagnosis  POA   • **Hypertensive emergency [I16.1]  Yes      Resolved Hospital Problems   No resolved problems to display.   esrd  Lupus  Pancytopenia  headache    Plan:  Ask nephrology to see her  Gi to see  Monitor on telemetry  Trend labs  LUISw patient and ED Provider    Laurence Cedeno MD  2/13/2023  21:48 EST

## 2023-02-14 NOTE — PLAN OF CARE
Goal Outcome Evaluation: Notified the MD on call for [facility] and Regi MAYNARD called me back and made her aware that the Cardene drip ordered have not started bec. Her SBP are below 180 mm/hg. Noticed that the pt BP get high if she's in pain. Patient up ad italo ,she is scheduled to have dialysis today. Latest BP at 0235 was 150/100. Will continue to monitor her.

## 2023-02-14 NOTE — CASE MANAGEMENT/SOCIAL WORK
Continued Stay Note  [facility]     Patient Name: Dee Herrera  MRN: [id number]  Today's Date: 2/14/2023    Admit Date: 2/13/2023    Plan: Home. Continue HD TTS at  [facility] [place]. Family to transport at D/C.   Discharge Plan     Row Name 02/14/23 1710       Plan    Plan Home. Continue HD TTS at  [facility] [place]. Family to transport at D/C.    Plan Comments Met with pt. at bedside. Pt in bed with eyes closed. Only shakes head yes or no. Explained roll of . Face sheet and pharmacy verified. Pt lives in a first floor apartment with a friend. Denies any home DME.  Pt is independent with ADLs. Pt has never been to Rehab or used HH. Receives HD TTS; [facility] [place]. Pt’s PCP is Bobby Corey MD. Pt enrolled with [facility] to [facility]. At discharge, family will transport. Explained that CCP would follow to assess for discharge needs.  Ian Holley RN-BC               Discharge Codes    No documentation.                     Ian Holley RN

## 2023-02-14 NOTE — PAYOR COMM NOTE
"Luz Elena Herrera (32 y.o. Female)     INPATIENT REQUEST FOR [id number]  .  CONTACT ALEKSANDER GARRISON  P# [phone]  F# [phone]      Date of Birth   1990    Social Security Number       Address   [address] [place] [id number]    Home Phone   [phone]    MRN   [id number]       [employer]   [employer]    Marital Status   Single                            Admission Date   23    Admission Type   Emergency    Admitting Provider   Laurence Cedeno MD    Attending Provider   Lakisha uHnt MD    Department, Room/Bed   [facility], E457/1       Discharge Date       Discharge Disposition       Discharge Destination                               Attending Provider: Lakisha Hunt MD    Allergies: No Known Allergies    Isolation: Spore   Infection: C.difficile (rule out) (23), C.difficile (23)   Code Status: CPR    Ht: 165.1 cm (65\")   Wt: 54.8 kg (120 lb 13 oz)    Admission Cmt: None   Principal Problem: Hypertensive urgency [I16.0]                 Active Insurance as of 2023     Primary Coverage     Payor Plan Insurance Group Employer/Plan Group    ANTHEM MEDICAID ANTHEM MEDICAID [id number]     Payor Plan Address Payor Plan Phone Number Payor Plan Fax Number Effective Dates    [address] [address] [phone]  2022 - None Entered    [facility] [id number]       Subscriber Name Subscriber Birth Date Member ID       LUZ ELENA HERRERA 1990 [id number]                 Emergency Contacts      (Rel.) Home Phone Work Phone Mobile Phone    HERRERAPAULINE NICOLAS (Grandparent) -- -- [phone]    DANYELL HERRERA (Mother) [phone] -- --            [place]: [facility] [id number]  Tax ID [id number]     History & Physical      Laurence Cedeno MD at 23          HISTORY AND PHYSICAL   [facility]        Date of Admission: 2023  Patient Identification:  Name: Luz Elena Herrera  Age: 32 y.o.  Sex: female  :  " 1990  MRN: [id number]                     Primary Care Physician: Bobby Corey MD    Chief Complaint:  32 year old female who presented to the emergency room with elevated blood pressure; she was sent by dr noland who was seeing her for abdominal pain and anemia; she denies chest pain; no dizziness; she has had a headache    History of Present Illness:   As above    Past Medical History:  Past Medical History:   Diagnosis Date   • History of anemia    • History of transfusion    • Hypertension    • Iron deficiency anemia 09/27/2021   • Lupus (systemic lupus erythematosus) (HCC) 07/30/2022   • Other specified nutritional anemias    • Seizures (HCC)    • Vitamin D deficiency 09/27/2021     Past Surgical History:  Past Surgical History:   Procedure Laterality Date   • INSERTION HEMODIALYSIS CATHETER N/A 07/26/2022    Procedure: RIGHT TUNNELED DIALYSIS CATHETER PLACEMENT;  Surgeon: Diandra Adhikari MD;  Location: [facility];  Service: Vascular;  Laterality: N/A;   • NO PAST SURGERIES     • TONSILLECTOMY        Home Meds:  Medications Prior to Admission   Medication Sig Dispense Refill Last Dose   • carvedilol (Coreg) 25 MG tablet Take 1 tablet by mouth 2 (Two) Times a Day With Meals. 60 tablet 3    • hydroxychloroquine (PLAQUENIL) 200 MG tablet 200 mg.      • lacosamide (VIMPAT) 50 MG tablet tablet 50 mg.      • Methoxy PEG-Epoetin Beta (MIRCERA IJ) 225 mcg Every 14 (Fourteen) Days.      • minoxidil (LONITEN) 10 MG tablet Take 1 tablet by mouth Daily. 90 tablet 1    • mycophenolate (CELLCEPT) 500 MG tablet 1,000 mg.      • NIFEdipine CC (ADALAT CC) 30 MG 24 hr tablet Take  by mouth.      • NIFEdipine XL (PROCARDIA XL) 60 MG 24 hr tablet 60 mg.      • omeprazole (priLOSEC) 40 MG capsule Take 1 capsule by mouth Daily. 90 capsule 1    • ondansetron ODT (ZOFRAN-ODT) 4 MG disintegrating tablet 4 mg.      • predniSONE (DELTASONE) 10 MG tablet Take 2 tablets by mouth Daily. 60 tablet 11        Allergies:  No  Known Allergies  Immunizations:  Immunization History   Administered Date(s) Administered   • HPV Quadrivalent 2008, 2009   • Influenza TIV (IM) 2009, 2009   • Meningococcal MCV4P (Menactra) 2007   • Tdap 06/10/2009     Social History:   Social History     Social History Narrative   • Not on file     Social History     Socioeconomic History   • Marital status: Single   Tobacco Use   • Smoking status: Never   • Smokeless tobacco: Never   Vaping Use   • Vaping Use: Never used   Substance and Sexual Activity   • Alcohol use: Not Currently     Comment: social   • Drug use: Yes     Types: Marijuana   • Sexual activity: Not Currently     Partners: Male     Birth control/protection: None       Family History:  Family History   Problem Relation Age of Onset   • Autoimmune disease Mother    • Anemia Mother    • Diabetes Sister    • Anemia Brother    • Diabetes Maternal Grandmother    • Hypertension Maternal Grandmother    • Cancer Maternal Grandmother    • Sickle cell anemia Cousin    • Malig Hyperthermia Neg Hx         Review of Systems  See history of present illness and past medical history.  Patient denies   dizziness, syncope, falls, trauma, change in vision, change in hearing, change in taste, changes in weight, changes in appetite, focal weakness, numbness, or paresthesia.  Patient denies chest pain, palpitations, dyspnea, orthopnea, PND, cough, sinus pressure, rhinorrhea, epistaxis, hemoptysis, nausea, vomiting,hematemesis, diarrhea, constipation or hematchezia.  Denies cold or heat intolerance, polydipsia, polyuria, polyphagia. Denies hematuria, pyuria, dysuria, hesitancy, frequency or urgency. Denies consumption of raw and under cooked meats foods or change in water source.  Denies fever, chills, sweats, night sweats.  Denies missing any routine medications. Remainder of ROS is negative.    Objective:  T Max 24 hrs: Temp (24hrs), Av.6 °F (37 °C), Min:98.2 °F (36.8 °C), Max:98.9 °F  "(37.2 °C)    Vitals Ranges:   Temp:  [98.2 °F (36.8 °C)-98.9 °F (37.2 °C)] 98.6 °F (37 °C)  Heart Rate:  [74-96] 88  Resp:  [16-18] 18  BP: (151-236)/(109-164) 160/110      Exam:  BP (!) 160/110 (BP Location: Left arm, Patient Position: Lying)   Pulse 88   Temp 98.6 °F (37 °C) (Oral)   Resp 18   Ht 165.1 cm (65\")   Wt 54.4 kg (120 lb)   SpO2 100%   BMI 19.97 kg/m²     General Appearance:    Alert, cooperative, no distress, appears older than stated age and chronically ill   Head:    Normocephalic, without obvious abnormality, atraumatic   Eyes:    PERRL, conjunctivae/corneas clear, EOM's intact, both eyes   Ears:    Normal external ear canals, both ears   Nose:   Nares normal, septum midline, mucosa normal, no drainage    or sinus tenderness   Throat:   Lips, mucosa, and tongue normal   Neck:   Supple, symmetrical, trachea midline, no adenopathy;     thyroid:  no enlargement/tenderness/nodules; no carotid    bruit or JVD   Back:     Symmetric, no curvature, ROM normal, no CVA tenderness   Lungs:     Clear to auscultation bilaterally, respirations unlabored   Chest Wall:    No tenderness or deformity    Heart:    Regular rate and rhythm, S1 and S2 normal, no murmur, rub   or gallop   Abdomen:     Soft, nontender, bowel sounds active all four quadrants,     no masses, no hepatomegaly, no splenomegaly   Extremities:   Extremities normal, atraumatic, no cyanosis or edema   Pulses:   2+ and symmetric all extremities   Skin:   Skin color, texture, turgor normal, no rashes or lesions               .    Data Review:  Labs in chart were reviewed.  WBC   Date Value Ref Range Status   02/13/2023 2.46 (L) 3.40 - 10.80 10*3/mm3 Final     Hemoglobin   Date Value Ref Range Status   02/13/2023 10.4 (L) 12.0 - 15.9 g/dL Final     Hematocrit   Date Value Ref Range Status   02/13/2023 35.0 34.0 - 46.6 % Final     Platelets   Date Value Ref Range Status   02/13/2023 26 (C) 140 - 450 10*3/mm3 Final     Sodium   Date Value Ref " Range Status   02/13/2023 135 (L) 136 - 145 mmol/L Final     Potassium   Date Value Ref Range Status   02/13/2023 4.7 3.5 - 5.2 mmol/L Final     Comment:     Slight hemolysis detected by analyzer. Results may be affected.     Chloride   Date Value Ref Range Status   02/13/2023 96 (L) 98 - 107 mmol/L Final     CO2   Date Value Ref Range Status   02/13/2023 30.0 (H) 22.0 - 29.0 mmol/L Final     BUN   Date Value Ref Range Status   02/13/2023 14 6 - 20 mg/dL Final     Creatinine   Date Value Ref Range Status   02/13/2023 6.91 (H) 0.57 - 1.00 mg/dL Final     Glucose   Date Value Ref Range Status   02/13/2023 89 65 - 99 mg/dL Final     Calcium   Date Value Ref Range Status   02/13/2023 9.1 8.6 - 10.5 mg/dL Final     AST (SGOT)   Date Value Ref Range Status   02/13/2023 42 (H) 1 - 32 U/L Final     Comment:     Slight hemolysis detected by analyzer. Results may be affected.     ALT (SGPT)   Date Value Ref Range Status   02/13/2023 9 1 - 33 U/L Final     Alkaline Phosphatase   Date Value Ref Range Status   02/13/2023 41 39 - 117 U/L Final     No results found for: APTT, INR             Imaging Results (All)     Procedure Component Value Units Date/Time    CT Head Without Contrast [[id number]] Collected: 02/13/23 1324     Updated: 02/13/23 1728    Narrative:      CT OF THE BRAIN WITHOUT CONTRAST 02/13/2023     HISTORY: Headache. Hypertension.     TECHNIQUE: Axial images were obtained through the brain without  intravenous contrast.     FINDINGS:  There is mild to moderate diffuse atrophy. There is some mild  decreased attenuation of the periventricular white matter particularly  in the right superior parietal region which may represent small vessel  ischemic disease. There is no evidence of acute infarction, hemorrhage  midline shift or mass effect.     There is mild mucosal thickening in the ethmoid sinuses.       Impression:      1. Atrophy and minimal changes of small vessel ischemic disease. These  findings are somewhat  advanced for a patient of this age.  2. No acute intracranial process.  3. Mild ethmoid sinusitis.  4. Findings were discussed with Dr. Hawthorne.        Radiation dose reduction techniques were utilized, including automated  exposure control and exposure modulation based on body size.     This report was finalized on 2023 5:24 PM by Dr. Clyde Rayo M.D.       XR Chest 1 View [[id number]] Collected: 23 1216     Updated: 23 1221    Narrative:      XR CHEST 1 VW-     HISTORY: Female who is 32 years-old,  hypertension, short of breath     TECHNIQUE: Frontal view of the chest     COMPARISON: 2022     FINDINGS: Right-sided central venous catheter appears stable. The heart  is enlarged. Pulmonary vasculature is unremarkable. Obscuration of the  left hemidiaphragm suggests small left basilar  atelectasis/infiltrate/effusion is suggested. No  pneumothorax. No acute  osseous process.       Impression:      Small left basilar atelectasis/infiltrate/effusion,  follow-up suggested. Cardiomegaly.     This report was finalized on 2023 12:18 PM by Dr. Norm Arshad M.D.               Assessment:  Active Hospital Problems    Diagnosis  POA   • **Hypertensive emergency [I16.1]  Yes      Resolved Hospital Problems   No resolved problems to display.   esrd  Lupus  Pancytopenia  headache    Plan:  Ask nephrology to see her  Gi to see  Monitor on telemetry  Trend labs  D.w patient and ED Provider    Laurence Cedeno MD  2023  21:48 EST      Electronically signed by aLurence Cedeno MD at 23 [phone]          Physician Progress Notes (last 48 hours)      Lakisha Hunt MD at 23 1257        Lab calls with positive C. difficile in the stool.  Will initiate vancomycin p.o.    Electronically signed by Lakisha Hunt MD at 23 4543     Lakisha Hunt MD at 23 1680              Name: Dee Herrera ADMIT: 2023   : 1990  PCP: Bobby Corey MD     MRN: [id number] LOS: 1 days   AGE/SEX: 32 y.o. female  ROOM: [facility]/     Subjective   Subjective   Patient reports abdominal pain that is diffuse associated with abdominal distention.  No nausea or vomiting.  Normal bowel habits without constipation/diarrhea/bleeding per rectum/melena.  No headaches.  Positive dizziness.  No seizures.  No focal neurological symptoms.  No chest pain.  No palpitation.  No shortness of breath.  No cough.  No wheeze.  No hemoptysis.    Review of Systems  .  Does not produce urine.    Objective   Objective   Vital Signs  Temp:  [97.7 °F (36.5 °C)-98.8 °F (37.1 °C)] 97.7 °F (36.5 °C)  Heart Rate:  [73-96] 87  Resp:  [16-18] 18  BP: (132-186)/(100-148) 132/100  SpO2:  [97 %-100 %] 100 %  on   ;   Device (Oxygen Therapy): room air    Intake/Output Summary (Last 24 hours) at 2/14/2023 1246  Last data filed at 2/14/2023 1100  Gross per 24 hour   Intake 480 ml   Output --   Net 480 ml     Body mass index is 20.1 kg/m².      02/13/23  1058 02/14/23  0445   Weight: 54.4 kg (120 lb) 54.8 kg (120 lb 13 oz)     Physical Exam  General.  Middle-aged female.  She is alert and oriented x3.  No apparent pain/distress/diaphoresis.  Normal mood and affect.  Eyes.  Pupils equal round and reactive.  Intact extraocular musculature.  No pallor or jaundice.    Oral cavity.  Moist mucous membrane.  Neck.  Supple.  No JVD.  No lymphadenopathy or thyromegaly.  Cardiovascular.  Regular rate and rhythm and grade 2 systolic murmur.  Chest.  Clear to auscultation bilaterally with no added sounds  Abdomen.  Soft lax.  Diffuse tenderness.  Mild distention.  Positive bowel sounds.  No guarding or rebound.  No organomegaly.  Extremities.  No clubbing/cyanosis/edema.  CNS.  No acute focal neurological deficits    Results Review:      Results from last 7 days   Lab Units 02/13/23  2208 02/13/23  1148   SODIUM mmol/L 134* 135*   POTASSIUM mmol/L 4.6 4.7   CHLORIDE mmol/L 96* 96*   CO2 mmol/L 30.0* 30.0*   BUN mg/dL 17 14    CREATININE mg/dL 7.53* 6.91*   GLUCOSE mg/dL 108* 89   CALCIUM mg/dL 8.7 9.1   AST (SGOT) U/L  --  42*   ALT (SGPT) U/L  --  9     Estimated Creatinine Clearance: 9.5 mL/min (A) (by C-G formula based on SCr of 7.53 mg/dL (H)).          Results from last 7 days   Lab Units 02/14/23  2355 02/13/23  2208 02/13/23  1148   HSTROP T ng/L 58* 68* 66*     Results from last 7 days   Lab Units 02/13/23  1148   PROBNP pg/mL >70,000.0*         Results from last 7 days   Lab Units 02/07/23  1356   MAGNESIUM mg/dL 1.9           Invalid input(s): LDLCALC  Results from last 7 days   Lab Units 02/14/23  0348 02/13/23  1148   WBC 10*3/mm3 2.51* 2.46*   HEMOGLOBIN g/dL 9.0* 10.4*   HEMATOCRIT % 29.5* 35.0   PLATELETS 10*3/mm3 27* 26*   MCV fL 78.0* 81.2   MCH pg 23.8* 24.1*   MCHC g/dL 30.5* 29.7*   RDW % 19.9* 19.7*   RDW-SD fl 52.9 55.4*   NEUTROPHIL % %  --  50.8   LYMPHOCYTE % %  --  29.7   MONOCYTES % %  --  18.3*   EOSINOPHIL % %  --  0.0*   BASOPHIL % %  --  0.4   NEUTROS ABS 10*3/mm3  --  1.25*   LYMPHS ABS 10*3/mm3  --  0.73   MONOS ABS 10*3/mm3  --  0.45   EOS ABS 10*3/mm3  --  0.00   BASOS ABS 10*3/mm3  --  0.01                                           Imaging:  Imaging Results (Last 24 Hours)     Procedure Component Value Units Date/Time    CT Head Without Contrast [[id number]] Collected: 02/13/23 1324     Updated: 02/13/23 1728    Narrative:      CT OF THE BRAIN WITHOUT CONTRAST 02/13/2023     HISTORY: Headache. Hypertension.     TECHNIQUE: Axial images were obtained through the brain without  intravenous contrast.     FINDINGS:  There is mild to moderate diffuse atrophy. There is some mild  decreased attenuation of the periventricular white matter particularly  in the right superior parietal region which may represent small vessel  ischemic disease. There is no evidence of acute infarction, hemorrhage  midline shift or mass effect.     There is mild mucosal thickening in the ethmoid sinuses.       Impression:      1.  Atrophy and minimal changes of small vessel ischemic disease. These  findings are somewhat advanced for a patient of this age.  2. No acute intracranial process.  3. Mild ethmoid sinusitis.  4. Findings were discussed with Dr. Hawthorne.        Radiation dose reduction techniques were utilized, including automated  exposure control and exposure modulation based on body size.     This report was finalized on 2/13/2023 5:24 PM by Dr. Clyde Rayo M.D.                I reviewed the patient's new clinical results / labs / tests / procedures      Assessment/Plan     Active Hospital Problems    Diagnosis  POA   • **Hypertensive urgency [I16.0]  Yes   • Pericardial effusion [I31.39]  Yes   • ESRD (end stage renal disease) ([facility]) [N18.6]  Yes   • Hemodialysis status ([facility]) [Z99.2]  Not Applicable   • Seizure disorder ([facility]) [G40.909]  Yes   • Elevated liver function tests [R79.89]  Yes   • Systemic lupus erythematosus ([facility]) [M32.9]  Yes   • Pancytopenia ([facility]) [D61.818]  Yes      Resolved Hospital Problems   No resolved problems to display.           · Hypertensive urgency in a patient with a history of hypertension and pericardial effusion.  Patient has shortness me that she is taking her medications regularly.  At this time I will continue Coreg/Adalat and increase minoxidil.  She did not have to start Cardene drip and I will DC.  We will give as needed hydralazine IV.  Patient last echo on 2/3/2023 revealed a normal ejection fraction, left ventricular strain, left ventricular hypertrophy, grade 1 diastolic dysfunction, left atrial enlargement, significant pericardial effusion.  Troponin is elevated but declining.  Elevated proBNP.  CT scan of the brain with atrophy and small vessel disease with no acute event.  Chest x-ray with a small atelectasis versus effusion in the left lung base.  EKG with normal sinus rhythm, left atrial enlargement, left anterior fascicular block, T wave inversion in the lateral leads with ST  elevation in the anterior leads (old changes).  There is no evidence of angina or congestive heart failure.  I will repeat 2D echo to reevaluate the pericardial effusion.  Will monitor blood pressure closely.  Elevation of the proBNP and troponin is most likely secondary to renal failure.  · End-stage renal disease.  On hemodialysis T/TH/sat.  Patient appears to be euvolemic.  Nephrology is following.  For dialysis today.  · Abdominal pain/distention.  Will check CT scanning of the abdomen pelvis with IV contrast/lipase/pregnancy test.  Liver function test is mildly elevated but I think it is because of medications.  Will consult GI.  As they were evaluating her as an outpatient.  Awaiting C. difficile stool.  · SLE.  Continue Plaquenil and CellCept.  · Seizures.  Continue Vimpat.  Negative CNS examination.  · Pancytopenia.  Low hemoglobin is stable about the baseline between 9.3 and 10.  Leukopenia is stable around the baseline of 2-3.  Platelets worse than the baseline of .  Will check IPF and CT scan of the abdomen.  Consult hematology oncology.  Questionable secondary to medications.    Discussed my findings and plan of treatment with the patient/nurses at multidisciplinary rounds.        Lakisha Hunt MD  [facility]  02/14/23  12:46 EST          Electronically signed by Lakisha Hunt MD at 02/14/23 1256          Consult Notes (last 48 hours)      Moose Bal at 02/14/23 1316      Consult Orders    1. Inpatient Consult to Advance Care Planning [[id number]] ordered by Laurence Cedeno MD at 02/13/23 1546             I assisted patient with completing Advance Directive.    Electronically signed by Moose Bal at 02/14/23 1317     Hair Mckeon MD at 02/14/23 0603      Consult Orders    1. Inpatient Nephrology Consult [[id number]] ordered by Laurence Cedeno MD at 02/13/23 2156                   NEPHROLOGY PROGRESS NOTE       [facility] Consult Note      Patient Name: Dee Herrera  : 1990  MRN: [id number]  Primary Care Physician:  Bobby Corey MD  Referring Physician: Laurence Cedeno MD  Date of admission: 2023    Subjective     Reason for Consult:  End-stage renal disease    HPI:   Dee Herrera is a 32 y.o. female with past medical history of systemic lupus erythematosus with history of lupus nephritis by percutaneous renal biopsy  that was treated with MMF and cyclophosphamide x1 , with chronic kidney disease that progressed to end-stage renal disease status post right IJ tunnel hemodialysis catheter undergoing hemodialysis on Tuesday, Thursday and Saturday schedule. that failed medical management with recurrent hospital admission for uncontrolled hypertension, press syndrome, chronic normocytic anemia, hyperphosphatemia, secondary hyperparathyroidism.  Patient has required multiple hospital admissions for uncontrolled hypertension for the last couple of months    Last week patient was admitted to [facility] for uncontrolled hypertension her medication treatment was optimized but patient is struggling with insurance coverage and has not a be able to afford her medication for the last 48 hours.    Patient presented to primary care provider and found to have a systolic blood pressure of 230's , patient was instructed to present to the emergency department for further management .    Patient presented to [facility] and then she was admitted.  Nephrology consultation been requested for the management        Review of Systems:   14 point review of systems is otherwise negative except for mentioned above on HPI    Personal History     Past Medical History:   Diagnosis Date   • History of anemia    • History of transfusion    • Hypertension    • Iron deficiency anemia 2021   • Lupus (systemic lupus erythematosus) (HCC) 2022   • Other specified nutritional anemias    • Seizures  ([facility])    • Vitamin D deficiency 09/27/2021       Past Surgical History:   Procedure Laterality Date   • INSERTION HEMODIALYSIS CATHETER N/A 07/26/2022    Procedure: RIGHT TUNNELED DIALYSIS CATHETER PLACEMENT;  Surgeon: Diandra Adhikari MD;  Location: [facility];  Service: Vascular;  Laterality: N/A;   • NO PAST SURGERIES     • TONSILLECTOMY         Family History: family history includes Anemia in her brother and mother; Autoimmune disease in her mother; Cancer in her maternal grandmother; Diabetes in her maternal grandmother and sister; Hypertension in her maternal grandmother; Sickle cell anemia in her cousin.    Social History:  reports that she has never smoked. She has never used smokeless tobacco. She reports that she does not currently use alcohol. She reports current drug use. Drug: Marijuana.    Home Medications:  Prior to Admission medications    Medication Sig Start Date End Date Taking? Authorizing Provider   carvedilol (Coreg) 25 MG tablet Take 1 tablet by mouth 2 (Two) Times a Day With Meals. 2/2/23   Bobby Corey MD   hydroxychloroquine (PLAQUENIL) 200 MG tablet 200 mg. 11/22/22   Ivet Manzano MD   lacosamide (VIMPAT) 50 MG tablet tablet 50 mg. 8/29/22   Ivet Manzano MD   Methoxy PEG-Epoetin Beta (MIRCERA IJ) 225 mcg Every 14 (Fourteen) Days. 1/17/23 1/16/24  Ivet Manzano MD   minoxidil (LONITEN) 10 MG tablet Take 1 tablet by mouth Daily. 11/2/22   Bobby Corey MD   mycophenolate (CELLCEPT) 500 MG tablet 1,000 mg. 11/22/22   Ivet Manzano MD   NIFEdipine CC (ADALAT CC) 30 MG 24 hr tablet Take  by mouth. 1/21/23   Ivet Manzano MD   NIFEdipine XL (PROCARDIA XL) 60 MG 24 hr tablet 60 mg. 11/22/22   Ivet Manzano MD   omeprazole (priLOSEC) 40 MG capsule Take 1 capsule by mouth Daily. 2/2/23   Bobby Corey MD   ondansetron ODT (ZOFRAN-ODT) 4 MG disintegrating tablet 4 mg. 11/21/22   Ivet Manzano MD   predniSONE (DELTASONE)  10 MG tablet Take 2 tablets by mouth Daily. 9/27/22 9/27/23  Provider, MD Ivet       Allergies:  No Known Allergies    Objective     Vitals:   Temp:  [98.2 °F (36.8 °C)-98.9 °F (37.2 °C)] 98.3 °F (36.8 °C)  Heart Rate:  [73-96] 80  Resp:  [16-18] 18  BP: (150-236)/(100-164) 150/100    Intake/Output Summary (Last 24 hours) at 2/14/2023 0603  Last data filed at 2/14/2023 0300  Gross per 24 hour   Intake 240 ml   Output --   Net 240 ml       Physical Exam:   Constitutional: Awake, alert, no acute distress.  HEENT: Sclera anicteric, no conjunctival injection  Neck: Supple, no thyromegaly, no lymphadenopathy, trachea at midline, no JVD  Respiratory: Clear to auscultation bilaterally, nonlabored respiration.  Right tunneled hemodialysis catheter in place  Cardiovascular: RRR,  systolic ejection murmur  Gastrointestinal: Positive bowel sounds, abdomen is soft, nontender and nondistended  : No palpable bladder  Musculoskeletal: No edema, no clubbing or cyanosis  Psychiatric: Appropriate affect, cooperative  Neurologic: Oriented x3, moving all extremities, normal speech and mental status  Skin: Warm and dry       Scheduled Meds:     carvedilol, 25 mg, Oral, Q12H  hydroxychloroquine, 200 mg, Oral, Q24H  lacosamide, 50 mg, Oral, Q12H  minoxidil, 10 mg, Oral, Q12H  mycophenolate, 1,000 mg, Oral, Q12H  NIFEdipine XL, 60 mg, Oral, BID  pantoprazole, 40 mg, Oral, Q AM  predniSONE, 20 mg, Oral, Daily      IV Meds:   niCARdipine, 5-15 mg/hr        Results Reviewed:   I have personally reviewed the results from the time of this admission to 2/14/2023 06:03 EST     Lab Results   Component Value Date    GLUCOSE 108 (H) 02/13/2023    CALCIUM 8.7 02/13/2023     (L) 02/13/2023    K 4.6 02/13/2023    CO2 30.0 (H) 02/13/2023    CL 96 (L) 02/13/2023    BUN 17 02/13/2023    CREATININE 7.53 (H) 02/13/2023    EGFRIFAFRI 107 06/25/2021    BCR 2.3 (L) 02/13/2023    ANIONGAP 8.0 02/13/2023      Lab Results   Component Value Date     MG 1.9 02/07/2023    PHOS 6.6 (H) 07/25/2022    ALBUMIN 3.7 02/13/2023           Assessment / Plan     ASSESSMENT:    -End Stage Renal Disease ( ESRD ) on Hemodialysis .s/p  RIJ TDC  functional .Continue to arrange hemodialysis treatment during his admission. Continue renal diet   -Chronic normocytic anemia. On Epogen protocol. We will continue to follow H&H closely    -Hyperphosphatemia. Continue binders  with meals, Continue renal diet. We will follow phosphorus to make further adjustments  -Secondary hyperparathyrodism . On calcitriol protocol at dialysis unit. will follow closely   -Controlled hypertension.  Continue current regimen carvedilol, hydralazine, minoxidil, nifedipine. We will continue to follow closely. Heart healthy diet .  Unfortunately the patient has been struggling with insurance coverage for the last 48 hours she has not been able to  her medications.   -Insurance coverage.  Patient would benefit from a care management evaluation.  Patient states that she was given a new prescription after recent discharge from [facility] but has unable to  her new medications due to unable to afford medications and lack of insurance coverage.  -Lupus nephritis with systemic lupus erythematosus on Plaquenil and prednisone      PLAN:  -We will arrange for hemodialysis during her admission on Tuesday, Thursday and Saturday schedule  -Resume home dose medications which include carvedilol, hydralazine, minoxidil and nifedipine  -Follow renal function closely  -Avoid nephrotoxins  -Adjust medications to GFR      Thank you for involving us in the care of Dee Herrera.  Please feel free to call with any questions.    Hair Mckeon MD  02/14/23  06:03 [facility]    [facility]  [phone]      Please note that portions of this note were completed with a voice recognition program.      Electronically signed by Hair Mckeon MD at 02/14/23 0461     Eladio  Eduardo Boles MD at 23 1517            [facility] Consult Note      Patient Name: Dee Herrera  : 1990  MRN: [id number]  Primary Care Physician:  Bobby Corey MD  Referring Physician: Laurence Cedeno MD  Date of admission: 2023    Subjective     Reason for Consult:  ESRD    HPI:   Dee Herrera is a 32 y.o. female with ESRD on HD ([facility]; [facility]; right chest catheter) for the past 6 months, admitted from GI office for further evaluation of very elevated blood pressure 210/147 found there.  Full PMH outlined below; pertinent is lupus nephritis.  She describes refractory hypertension with similarly elevated readings on dialysis days.  Last HD was .    · Home BP medications include minoxidil 10 mg daily, carvedilol 25 mg BID, and amlodipine unknown dose daily (this took the place of nifedipine).  She reports compliance with medications  · Describes shortness of breath for several days; dialysis and fluid removal does not make it better  · Describes chest heaviness also for the past several days  · States that she has had nausea, vomiting, and diarrhea for many weeks; although she feels nauseated now, she states, she is requesting something to eat  · No fever or chills  · No orthopnea and no leg swelling  · Still makes urine    Review of Systems:   14 point review of systems is otherwise negative except for mentioned above on HPI    Personal History     Past Medical History:   Diagnosis Date   • History of anemia    • History of transfusion    • Hypertension    • Iron deficiency anemia 2021   • Lupus (systemic lupus erythematosus) (HCC) 2022   • Other specified nutritional anemias    • Seizures ([facility])    • Vitamin D deficiency 2021       Past Surgical History:   Procedure Laterality Date   • INSERTION HEMODIALYSIS CATHETER N/A 2022    Procedure: RIGHT TUNNELED DIALYSIS CATHETER PLACEMENT;  Surgeon: Diandra Adhikari MD;   Location: [facility] MAIN OR;  Service: Vascular;  Laterality: N/A;   • NO PAST SURGERIES     • TONSILLECTOMY         Family History: family history includes Anemia in her brother and mother; Autoimmune disease in her mother; Cancer in her maternal grandmother; Diabetes in her maternal grandmother and sister; Hypertension in her maternal grandmother; Sickle cell anemia in her cousin.    Social History:  reports that she has never smoked. She has never used smokeless tobacco. She reports that she does not currently use alcohol. She reports current drug use. Drug: Marijuana.    Home Medications:  Prior to Admission medications    Medication Sig Start Date End Date Taking? Authorizing Provider   carvedilol (Coreg) 25 MG tablet Take 1 tablet by mouth 2 (Two) Times a Day With Meals. 2/2/23   Bobby Corey MD   hydroxychloroquine (PLAQUENIL) 200 MG tablet 200 mg. 11/22/22   Ivet Manzano MD   lacosamide (VIMPAT) 50 MG tablet tablet 50 mg. 8/29/22   Ivet Manzano MD   Methoxy PEG-Epoetin Beta (MIRCERA IJ) 225 mcg Every 14 (Fourteen) Days. 1/17/23 1/16/24  Ivet Manzano MD   minoxidil (LONITEN) 10 MG tablet Take 1 tablet by mouth Daily. 11/2/22   Bobby Corey MD   mycophenolate (CELLCEPT) 500 MG tablet 1,000 mg. 11/22/22   Ivet Manzano MD   NIFEdipine CC (ADALAT CC) 30 MG 24 hr tablet Take  by mouth. 1/21/23   Ivet Manzano MD   NIFEdipine XL (PROCARDIA XL) 60 MG 24 hr tablet 60 mg. 11/22/22   Ivet Manzano MD   omeprazole (priLOSEC) 40 MG capsule Take 1 capsule by mouth Daily. 2/2/23   Bobby Corey MD   ondansetron ODT (ZOFRAN-ODT) 4 MG disintegrating tablet 4 mg. 11/21/22   Ivet Manzano MD   predniSONE (DELTASONE) 10 MG tablet Take 2 tablets by mouth Daily. 9/27/22 9/27/23  Ivet Manzano MD       Allergies:  No Known Allergies    Objective     Vitals:   Temp:  [98.2 °F (36.8 °C)-98.9 °F (37.2 °C)] 98.9 °F (37.2 °C)  Heart Rate:  [74-85] 79  Resp:   [16] 16  BP: (168-236)/(122-164) 168/122  No intake or output data in the 24 hours ending 02/13/23 1517    Physical Exam:   Constitutional: Awake, chronically ill, cushingoid  Psychiatric: Very flat affect and depressed mood  HEENT: Sclera anicteric, no conjunctival injection  Neck: Supple, no carotid bruit, trachea at midline, no JVD  Respiratory: Diminished in bases, but no wheezes or crackles; nonlabored on RA  Cardiovascular: RRR, 2/6M, no rub  Gastrointestinal: BS +, soft, + T but no G or R, and nondistended  Vascular: Right chest TDC  : No palpable bladder  Musculoskeletal: No significant edema; + clubbing  Neurologic:  moving all extremities, normal speech though seems reluctant to talk  Skin: Warm and dry       Scheduled Meds:        IV Meds:        Results Reviewed:   I have personally reviewed the results from the time of this admission to 2/13/2023 15:17 EST     Lab Results   Component Value Date    GLUCOSE 89 02/13/2023    CALCIUM 9.1 02/13/2023     (L) 02/13/2023    K 4.7 02/13/2023    CO2 30.0 (H) 02/13/2023    CL 96 (L) 02/13/2023    BUN 14 02/13/2023    CREATININE 6.91 (H) 02/13/2023    EGFRIFAFRI 107 06/25/2021    BCR 2.0 (L) 02/13/2023    ANIONGAP 9.0 02/13/2023      Lab Results   Component Value Date    MG 1.9 02/07/2023    PHOS 6.6 (H) 07/25/2022    ALBUMIN 3.7 02/13/2023           Assessment / Plan     ASSESSMENT:  1.  ESRD: Stable volume by exam and imaging.  Normal potassium and anion gap; last HD was 2/11, and due for HD tomorrow.  Renal biopsy July '22 with proliferative lupus nephritis and membranous lupus nephritis, stage V.  2.  Hypertensive urgency:  suspect she has malignant hypertension; she reports compliance with BP medications.  Denies illicit drugs other than marijuana  3.  Lupus on CellCept and prednisone; also has history of TTP  4.  Chronic N/V/D with 30-pound weight loss over the past 6 months, coinciding with start of dialysis  5.  Pancytopenia  6.  Elevated  troponin    PLAN:  1.  Begin Cardene drip  2.  Increase minoxidil to 10 mg twice daily, and resume carvedilol at home dose  3.  HD tomorrow      Thank you for involving us in the care of Dee Herrera.  Please feel free to call with any questions.    Eduardo Hendricks MD  02/13/23  15:17 [facility]    [facility]  [phone]      Please note that portions of this note were completed with a voice recognition program.    Electronically signed by Eduardo Hendricks MD at 02/13/23 3834

## 2023-02-14 NOTE — PLAN OF CARE
Goal Outcome Evaluation:  Plan of Care Reviewed With: patient          Problem: Adult Inpatient Plan of Care  Goal: Plan of Care Review  Outcome: Ongoing, Progressing  Flowsheets (Taken 2/14/2023 7608)  Progress: improving  Plan of Care Reviewed With: patient  Outcome Evaluation: Vitals stable. Pt had CT of abdomen and echo. Pt in contact spore precautions for c.diff - started on PO vanco. Dialysis ordered for today. Pt needs met and questions answered.

## 2023-02-14 NOTE — NURSING NOTE
"Notified on call for A and Regi MAYNARD called me back to inform her that I haven\"t started the cardene drip bec. her BP was below 180 mm/hg , the latest was 160 mm/hg SBP, and she is aware.  "

## 2023-02-14 NOTE — CONSULTS
Chief Complaint   Patient presents with   • Hypertension       Dee Herrera is a 32 y.o. female who presents with hypertension    HPI  Mrs. Herrera is a 32 yoF w h/o SLE, lupus nephritis that progressed to ESRD now on HD who presents with hypertension.  She is being evaluated at  for kidney transplant.  Patient was seen in GI clinic yesterday with Dr. Kaminski and was noted to have elevated BP and was sent to ED.   She reports she has had diffuse abd pain for the past 6 months at least.  She reports no relation to eating.  She reports she is having multiple BMs.  Denies recent abx.  No fever or chills.  Occasional nausea.   She has had abdominal pain for some time now and had CT abd/pelvis at  that noted marked wall thickening of duodenal and jejunal loop of small bowel, marked cardiac cirrhosis with sludge in GB, no ben dil, mild acute pancreatitis with peripancreatic phlegmon, small left effusion and marked pericardial effusion, anasarca, reactve inguinal LAD likely post infectious or inflammatory.     Dr. Kaminski had planned for celiac, hepatic serology, stool studies as well as EGD.  Past Medical History:   Diagnosis Date   • History of anemia    • History of transfusion    • Hypertension    • Iron deficiency anemia 09/27/2021   • Lupus (systemic lupus erythematosus) (HCC) 07/30/2022   • Other specified nutritional anemias    • Seizures (HCC)    • Vitamin D deficiency 09/27/2021       Past Surgical History:   Procedure Laterality Date   • INSERTION HEMODIALYSIS CATHETER N/A 07/26/2022    Procedure: RIGHT TUNNELED DIALYSIS CATHETER PLACEMENT;  Surgeon: Diandra Adhikari MD;  Location: [facility];  Service: Vascular;  Laterality: N/A;   • NO PAST SURGERIES     • TONSILLECTOMY           Current Facility-Administered Medications:   •  acetaminophen (TYLENOL) tablet 650 mg, 650 mg, Oral, Q4H PRN, Laurence Cedeno MD, 650 mg at 02/13/23 2138  •  carvedilol (COREG) tablet 25 mg, 25 mg, Oral, Q12H, Eladio  Eduardo Boles MD, 25 mg at 02/13/23 2014  •  hydroxychloroquine (PLAQUENIL) tablet 200 mg, 200 mg, Oral, Q24H, Laurence Cedeno MD  •  lacosamide (VIMPAT) tablet 50 mg, 50 mg, Oral, Q12H, Laurence Cedeno MD, 50 mg at 02/13/23 2328  •  melatonin tablet 3 mg, 3 mg, Oral, Nightly PRN, Laurence Cedeno MD, 3 mg at 02/13/23 2138  •  minoxidil (LONITEN) tablet 10 mg, 10 mg, Oral, Q12H, Eduardo Hendricks MD, 10 mg at 02/13/23 2014  •  mycophenolate (CELLCEPT) tablet 1,000 mg, 1,000 mg, Oral, Q12H, Laurence Cedeno MD, 1,000 mg at 02/13/23 2329  •  niCARdipine (CARDENE) 25 mg in 250 mL NS infusion kit, 5-15 mg/hr, Intravenous, Titrated, Eduardo Hendricks MD  •  NIFEdipine XL (PROCARDIA XL) 24 hr tablet 60 mg, 60 mg, Oral, BID, Laurence Cedeno MD, 60 mg at 02/13/23 2328  •  ondansetron (ZOFRAN) tablet 4 mg, 4 mg, Oral, Q6H PRN **OR** ondansetron (ZOFRAN) injection 4 mg, 4 mg, Intravenous, Q6H PRN, Laurence Cedeno MD  •  pantoprazole (PROTONIX) EC tablet 40 mg, 40 mg, Oral, Q AM, Laurence Cedeno MD, 40 mg at 02/14/23 0632  •  predniSONE (DELTASONE) tablet 10 mg, 10 mg, Oral, Daily, Hair Mckeon MD  •  [COMPLETED] Insert Peripheral IV, , , Once **AND** sodium chloride 0.9 % flush 10 mL, 10 mL, Intravenous, PRN, Anthony Hawthorne MD    No Known Allergies    Social History     Socioeconomic History   • Marital status: Single   Tobacco Use   • Smoking status: Never   • Smokeless tobacco: Never   Vaping Use   • Vaping Use: Never used   Substance and Sexual Activity   • Alcohol use: Not Currently     Comment: social   • Drug use: Yes     Types: Marijuana   • Sexual activity: Not Currently     Partners: Male     Birth control/protection: None       Family History   Problem Relation Age of Onset   • Autoimmune disease Mother    • Anemia Mother    • Diabetes Sister    • Anemia Brother    • Diabetes Maternal Grandmother    • Hypertension Maternal Grandmother    • Cancer  Maternal Grandmother    • Sickle cell anemia Cousin    • Malig Hyperthermia Neg Hx        Review of Systems   Constitutional: Negative for chills and fever.   Respiratory: Negative for cough and shortness of breath.    Gastrointestinal: Positive for abdominal pain, diarrhea and nausea. Negative for abdominal distention and vomiting.   Skin: Negative for color change and rash.   All other systems reviewed and are negative.      Vitals:    02/14/23 0740   BP:    Pulse: 84   Resp: 18   Temp: 97.7 °F (36.5 °C)   SpO2: 98%       Physical Exam   General: Patient chronically ill appearing, alert and cooperative   Eyes: Normal lids and lashes, no scleral icterus   Neck: Supple, normal ROM   Skin: Warm and dry, not jaundiced   Cardiovascular: Regular rate, well-perfused extremities   Pulm: Equal expansion bilaterally, no increased WOB   Abdomen: Soft, nontender, nondistended;    Extremities: No rash or edema              Neuro: A&O, no obvious sign of focal deficit   Psychiatric: Normal mood and behavior; memory intact    CT Head Without Contrast    Result Date: 2/13/2023  1. Atrophy and minimal changes of small vessel ischemic disease. These findings are somewhat advanced for a patient of this age. 2. No acute intracranial process. 3. Mild ethmoid sinusitis. 4. Findings were discussed with Dr. Hawthorne.   Radiation dose reduction techniques were utilized, including automated exposure control and exposure modulation based on body size.  This report was finalized on 2/13/2023 5:24 PM by Dr. Clyde Rayo M.D.      XR Chest 1 View    Result Date: 2/13/2023  Small left basilar atelectasis/infiltrate/effusion, follow-up suggested. Cardiomegaly.  This report was finalized on 2/13/2023 12:18 PM by Dr. Norm Arshad M.D.        Impression:  HTN  ESRD on HD  SLE  Chronic Diffuse Abdominal Pain  Diarrhea  Cirrhotic appearing liver on Imaging  Abnormal Imaging of Pancreas in November suggestive of  pancreatitis  Ansasarca  Thrombocytopenia    Plan:  - CT abd/pelvis at  in November that noted marked wall thickening of duodenal and jejunal loop of small bowel, marked cardiac cirrhosis with sludge in GB, no ben dil, mild acute pancreatitis with peripancreatic phlegmon, small left effusion and marked pericardial effusion, anasarca, reactve inguinal LAD likely post infectious or inflammatory  - Repeat CT abd/pelvis w contrast   - Infectious stool studies with c diff and GI PCR  - Check some of the labs as outlined in Dr. Kaminski note for AM: GIANFRANCO, AMA. ASMA, Ig profile, TTG IgA, ceruloplasmin, acute hep panel, A1AT      Jose Juan Cotton MD

## 2023-02-15 ENCOUNTER — HOSPITAL ENCOUNTER (OUTPATIENT)
Dept: CARDIOLOGY | Facility: HOSPITAL | Age: 33
Discharge: HOME | End: 2023-02-15
Payer: COMMERCIAL

## 2023-02-15 LAB
ALBUMIN SERPL-MCNC: 3.6 G/DL (ref 3.5–5.2)
ALP SERPL-CCNC: 40 U/L (ref 39–117)
ALT SERPL W P-5'-P-CCNC: 8 U/L (ref 1–33)
ANION GAP SERPL CALCULATED.3IONS-SCNC: 7.9 MMOL/L (ref 5–15)
AST SERPL-CCNC: 23 U/L (ref 1–32)
BILIRUB CONJ SERPL-MCNC: 0.2 MG/DL (ref 0–0.3)
BILIRUB INDIRECT SERPL-MCNC: 0.4 MG/DL
BILIRUB SERPL-MCNC: 0.6 MG/DL (ref 0–1.2)
BUN SERPL-MCNC: 8 MG/DL (ref 6–20)
BUN/CREAT SERPL: 1.7 (ref 7–25)
CALCIUM SPEC-SCNC: 8.7 MG/DL (ref 8.6–10.5)
CERULOPLASMIN SERPL-MCNC: 17 MG/DL (ref 19–39)
CHLORIDE SERPL-SCNC: 100 MMOL/L (ref 98–107)
CO2 SERPL-SCNC: 27.1 MMOL/L (ref 22–29)
CREAT SERPL-MCNC: 4.63 MG/DL (ref 0.57–1)
DEPRECATED RDW RBC AUTO: 53.3 FL (ref 37–54)
EGFRCR SERPLBLD CKD-EPI 2021: 12.4 ML/MIN/1.73
ERYTHROCYTE [DISTWIDTH] IN BLOOD BY AUTOMATED COUNT: 19.5 % (ref 12.3–15.4)
GLUCOSE BLDC GLUCOMTR-MCNC: 131 MG/DL (ref 70–130)
GLUCOSE SERPL-MCNC: 114 MG/DL (ref 65–99)
HAV IGM SERPL QL IA: NORMAL
HBV CORE IGM SERPL QL IA: NORMAL
HBV SURFACE AG SERPL QL IA: NORMAL
HCT VFR BLD AUTO: 31.1 % (ref 34–46.6)
HCV AB SER DONR QL: NORMAL
HGB BLD-MCNC: 9.4 G/DL (ref 12–15.9)
HGB RETIC QN AUTO: 25.9 PG (ref 29.8–36.1)
IGA1 MFR SER: 174 MG/DL (ref 70–400)
IGG1 SER-MCNC: 1255 MG/DL (ref 700–1600)
IGM SERPL-MCNC: 79 MG/DL (ref 40–230)
IMM RETICS NFR: 10.9 % (ref 3–15.8)
LDH SERPL-CCNC: 721 U/L (ref 135–214)
LIPASE SERPL-CCNC: 22 U/L (ref 13–60)
MCH RBC QN AUTO: 23.9 PG (ref 26.6–33)
MCHC RBC AUTO-ENTMCNC: 30.2 G/DL (ref 31.5–35.7)
MCV RBC AUTO: 78.9 FL (ref 79–97)
PLATELET # BLD AUTO: 52 10*3/MM3 (ref 140–450)
PLATELET # BLD AUTO: 59 10*3/MM3 (ref 140–450)
PLATELETS.RETICULATED NFR BLD AUTO: 16.2 % (ref 0.9–6.5)
PMV BLD AUTO: ABNORMAL FL
POTASSIUM SERPL-SCNC: 3.9 MMOL/L (ref 3.5–5.2)
PROT SERPL-MCNC: 5.9 G/DL (ref 6–8.5)
RBC # BLD AUTO: 3.94 10*6/MM3 (ref 3.77–5.28)
RETICS # AUTO: 0.08 10*6/MM3 (ref 0.02–0.13)
RETICS/RBC NFR AUTO: 2.1 % (ref 0.7–1.9)
SODIUM SERPL-SCNC: 135 MMOL/L (ref 136–145)
WBC NRBC COR # BLD: 1.92 10*3/MM3 (ref 3.4–10.8)

## 2023-02-15 PROCEDURE — 80074 ACUTE HEPATITIS PANEL: CPT | Performed by: INTERNAL MEDICINE

## 2023-02-15 PROCEDURE — 63710000001 PREDNISONE PER 5 MG: Performed by: INTERNAL MEDICINE

## 2023-02-15 PROCEDURE — 80076 HEPATIC FUNCTION PANEL: CPT | Performed by: INTERNAL MEDICINE

## 2023-02-15 PROCEDURE — 82104 ALPHA-1-ANTITRYPSIN PHENO: CPT | Performed by: INTERNAL MEDICINE

## 2023-02-15 PROCEDURE — 99222 1ST HOSP IP/OBS MODERATE 55: CPT | Performed by: INTERNAL MEDICINE

## 2023-02-15 PROCEDURE — 86235 NUCLEAR ANTIGEN ANTIBODY: CPT | Performed by: INTERNAL MEDICINE

## 2023-02-15 PROCEDURE — 25010000002 ONDANSETRON PER 1 MG: Performed by: INTERNAL MEDICINE

## 2023-02-15 PROCEDURE — 25010000002 HYDRALAZINE PER 20 MG: Performed by: INTERNAL MEDICINE

## 2023-02-15 PROCEDURE — 86015 ACTIN ANTIBODY EACH: CPT | Performed by: INTERNAL MEDICINE

## 2023-02-15 PROCEDURE — 83615 LACTATE (LD) (LDH) ENZYME: CPT | Performed by: INTERNAL MEDICINE

## 2023-02-15 PROCEDURE — 63710000001 MYCOPHENOLATE MOFETIL PER 250 MG: Performed by: INTERNAL MEDICINE

## 2023-02-15 PROCEDURE — 82962 GLUCOSE BLOOD TEST: CPT

## 2023-02-15 PROCEDURE — 83516 IMMUNOASSAY NONANTIBODY: CPT | Performed by: INTERNAL MEDICINE

## 2023-02-15 PROCEDURE — 82103 ALPHA-1-ANTITRYPSIN TOTAL: CPT | Performed by: INTERNAL MEDICINE

## 2023-02-15 PROCEDURE — 99232 SBSQ HOSP IP/OBS MODERATE 35: CPT | Performed by: PHYSICIAN ASSISTANT

## 2023-02-15 PROCEDURE — 86381 MITOCHONDRIAL ANTIBODY EACH: CPT | Performed by: INTERNAL MEDICINE

## 2023-02-15 PROCEDURE — 86038 ANTINUCLEAR ANTIBODIES: CPT | Performed by: INTERNAL MEDICINE

## 2023-02-15 PROCEDURE — 85025 COMPLETE CBC W/AUTO DIFF WBC: CPT | Performed by: INTERNAL MEDICINE

## 2023-02-15 PROCEDURE — 80048 BASIC METABOLIC PNL TOTAL CA: CPT | Performed by: INTERNAL MEDICINE

## 2023-02-15 PROCEDURE — 85055 RETICULATED PLATELET ASSAY: CPT | Performed by: INTERNAL MEDICINE

## 2023-02-15 PROCEDURE — 86364 TISS TRNSGLTMNASE EA IG CLAS: CPT | Performed by: INTERNAL MEDICINE

## 2023-02-15 PROCEDURE — 85046 RETICYTE/HGB CONCENTRATE: CPT | Performed by: INTERNAL MEDICINE

## 2023-02-15 PROCEDURE — 82784 ASSAY IGA/IGD/IGG/IGM EACH: CPT | Performed by: INTERNAL MEDICINE

## 2023-02-15 PROCEDURE — 86225 DNA ANTIBODY NATIVE: CPT | Performed by: INTERNAL MEDICINE

## 2023-02-15 PROCEDURE — 82390 ASSAY OF CERULOPLASMIN: CPT | Performed by: INTERNAL MEDICINE

## 2023-02-15 PROCEDURE — 83690 ASSAY OF LIPASE: CPT | Performed by: PHYSICIAN ASSISTANT

## 2023-02-15 RX ORDER — MYCOPHENOLATE MOFETIL 500 MG/1
250 TABLET ORAL EVERY 12 HOURS SCHEDULED
Status: DISCONTINUED | OUTPATIENT
Start: 2023-02-15 | End: 2023-02-18 | Stop reason: HOSPADM

## 2023-02-15 RX ORDER — GABAPENTIN 100 MG/1
100 CAPSULE ORAL EVERY 12 HOURS SCHEDULED
Status: DISCONTINUED | OUTPATIENT
Start: 2023-02-15 | End: 2023-02-18 | Stop reason: HOSPADM

## 2023-02-15 RX ORDER — SIMETHICONE 80 MG
80 TABLET,CHEWABLE ORAL 4 TIMES DAILY PRN
Status: DISCONTINUED | OUTPATIENT
Start: 2023-02-15 | End: 2023-02-18 | Stop reason: HOSPADM

## 2023-02-15 RX ORDER — HYDRALAZINE HYDROCHLORIDE 25 MG/1
25 TABLET, FILM COATED ORAL EVERY 8 HOURS SCHEDULED
Status: DISCONTINUED | OUTPATIENT
Start: 2023-02-15 | End: 2023-02-18 | Stop reason: HOSPADM

## 2023-02-15 RX ADMIN — VANCOMYCIN HYDROCHLORIDE 125 MG: 125 CAPSULE ORAL at 11:50

## 2023-02-15 RX ADMIN — MINOXIDIL 20 MG: 10 TABLET ORAL at 08:42

## 2023-02-15 RX ADMIN — LACOSAMIDE 50 MG: 100 TABLET, FILM COATED ORAL at 20:50

## 2023-02-15 RX ADMIN — ONDANSETRON 4 MG: 2 INJECTION INTRAMUSCULAR; INTRAVENOUS at 02:37

## 2023-02-15 RX ADMIN — MYCOPHENOLATE MOFETIL 1000 MG: 500 TABLET ORAL at 08:42

## 2023-02-15 RX ADMIN — FAMOTIDINE 20 MG: 20 TABLET, FILM COATED ORAL at 08:43

## 2023-02-15 RX ADMIN — PREDNISONE 10 MG: 10 TABLET ORAL at 08:43

## 2023-02-15 RX ADMIN — LACOSAMIDE 50 MG: 100 TABLET, FILM COATED ORAL at 08:42

## 2023-02-15 RX ADMIN — MINOXIDIL 20 MG: 10 TABLET ORAL at 00:00

## 2023-02-15 RX ADMIN — CARVEDILOL 25 MG: 25 TABLET, FILM COATED ORAL at 00:01

## 2023-02-15 RX ADMIN — GABAPENTIN 100 MG: 100 CAPSULE ORAL at 20:50

## 2023-02-15 RX ADMIN — VANCOMYCIN HYDROCHLORIDE 125 MG: 125 CAPSULE ORAL at 00:04

## 2023-02-15 RX ADMIN — HYDROXYCHLOROQUINE SULFATE 200 MG: 200 TABLET, FILM COATED ORAL at 08:42

## 2023-02-15 RX ADMIN — HYDRALAZINE HYDROCHLORIDE 25 MG: 25 TABLET, FILM COATED ORAL at 17:04

## 2023-02-15 RX ADMIN — HYDRALAZINE HYDROCHLORIDE 25 MG: 25 TABLET, FILM COATED ORAL at 21:56

## 2023-02-15 RX ADMIN — VANCOMYCIN HYDROCHLORIDE 125 MG: 125 CAPSULE ORAL at 06:19

## 2023-02-15 RX ADMIN — HYDRALAZINE HYDROCHLORIDE 10 MG: 20 INJECTION INTRAMUSCULAR; INTRAVENOUS at 02:36

## 2023-02-15 RX ADMIN — ONDANSETRON 4 MG: 2 INJECTION INTRAMUSCULAR; INTRAVENOUS at 21:56

## 2023-02-15 RX ADMIN — MYCOPHENOLATE MOFETIL 250 MG: 500 TABLET ORAL at 20:50

## 2023-02-15 RX ADMIN — GABAPENTIN 100 MG: 100 CAPSULE ORAL at 10:08

## 2023-02-15 RX ADMIN — NIFEDIPINE 60 MG: 60 TABLET, FILM COATED, EXTENDED RELEASE ORAL at 20:50

## 2023-02-15 RX ADMIN — CARVEDILOL 25 MG: 25 TABLET, FILM COATED ORAL at 08:42

## 2023-02-15 RX ADMIN — CARVEDILOL 25 MG: 25 TABLET, FILM COATED ORAL at 20:50

## 2023-02-15 RX ADMIN — VANCOMYCIN HYDROCHLORIDE 125 MG: 125 CAPSULE ORAL at 23:22

## 2023-02-15 RX ADMIN — VANCOMYCIN HYDROCHLORIDE 125 MG: 125 CAPSULE ORAL at 17:04

## 2023-02-15 RX ADMIN — Medication 10 ML: at 08:41

## 2023-02-15 RX ADMIN — NIFEDIPINE 60 MG: 60 TABLET, FILM COATED, EXTENDED RELEASE ORAL at 08:42

## 2023-02-15 NOTE — PLAN OF CARE
Problem: Adult Inpatient Plan of Care  Goal: Plan of Care Review  Outcome: Ongoing, Progressing  Flowsheets (Taken 2/15/2023 2905)  Progress: no change  Plan of Care Reviewed With: patient  Outcome Evaluation: Vitals stable. Dialysis ordered for tomorrow. Medications adjusted per Renal. Tolerating diet. No BMs today. c/o Headache -  Gabapentin ordered BID. Family visited. Will continue to monitor.

## 2023-02-15 NOTE — TELEPHONE ENCOUNTER
"See care everywhere 10/12/222 - [facility] \"summary of episode note\".      Message to DR Kaminski.   "

## 2023-02-15 NOTE — NURSING NOTE
Pt tolerated tx well with no issues.   VSS but hypertensive.  no symptoms.  catheter worked well,   u/f goal met     Post tx,  no complaints,   pt received  meds for HTN        U/f 2100

## 2023-02-15 NOTE — CONSULTS
[facility] Consult    Patient Name: Dee Herrera  Age/Sex: 32 y.o. female  : 1990  MRN: [id number]    Date of Admission: 2023  Date of Encounter Visit: 02/15/23  Encounter Provider: Juana Taylor MD  Referring Provider: Laurence Cedeno MD  Place of Service: [facility] CARDIOLOGY  Patient Care Team:  Bobby Corey MD as PCP - General (Internal Medicine)  Winnie Sanchez MD as Referring Physician (Obstetrics and Gynecology)  Norberto Almaraz MD PhD as Consulting Physician (Hematology and Oncology)  Lupis Thomas MD as Consulting Physician (Nephrology)    Subjective:     Consulted for: Pericardial effusion, hypertension    Chief Complaint: Elevated blood pressures    History of Present Illness:  Dee Herrera is a 32 y.o. female with systemic lupus erythematosus, hypertension, ESRD on hemodialysis, pericardial effusion, who was sent to the emergency room for elevated blood pressures.    Patient was being evaluated in the office by Dr. Kaminski for abdominal pain, nausea, and an abnormal abdominal CT scan.  On arrival to his office she was noted to be hypertensive.  She had been out of her blood pressure medications for about 11 days.  She complained of shortness of breath and headaches at the time.  He recommended proceeding with an EGD to further evaluate diagnosis of duodenitis and to look for varices given reported cirrhosis seen on CT.  However he wanted to wait until her blood pressures were better controlled.  He also recommended a gallbladder and liver ultrasound.  Due to her elevated blood pressures it was recommended that she go to the emergency room.    Following arrival to the hospital she reported shortness of breath and chest heaviness.  She also reported nausea, vomiting, and diarrhea.  Her symptoms of shortness of breath did not really improve with dialysis.  She was started on nicardipine infusion and resumed on her home  "blood pressure medications.  She was eventually weaned off of the nicardipine.  She has been followed by nephrology since her admission.  Gastroenterology has also evaluated the patient while here.  They recommended a repeat CT of the abdomen and pelvis.  A repeat CT here showed findings suggestive of pancreatitis.      CT also reported pericardial effusion.  For this reason an echocardiogram was performed on 2/14/2023 and showed normal left ventricular systolic function with an EF of 60%, severe left ventricular hypertrophy concerning for infiltrative cardiomyopathy, longstanding hypertension, or chronic renal insufficiency, grade 2 diastolic dysfunction, no significant valvular disease, elevated left atrial pressure, and \"moderate to large circumferential pericardial effusion largest posteriorly with no evidence of cardiac tamponade\".    I recently saw the patient in the office on 2/3/2023 for evaluation of her pericardial effusion which was initially noted on a CT scan in November.  At the time of my office visit the patient main complaint was nausea and vomiting and abdominal discomfort.  She denied any chest discomfort or shortness of breath out of the ordinary at the time.  Blood pressures appear to be well controlled at that office visit.  The patient had undergone an echocardiogram on the same day which showed normal left ventricular systolic function wall motion with an EF of 64%, again severe left ventricular hypertrophy, and a small to moderate pericardial effusion largest posteriorly with no evidence of tamponade.  At that office visit I recommended further work-up to rule out amyloid with a PYP scan and SPEP.  UPEP cannot be obtained since the patient was anuric.  She was originally scheduled for her PYP scan tomorrow.    When examined the patient today she asked that I speak with her mother who is currently not at bedside.  She did allow me to examine her.  She also denies any shortness of breath or " chest discomfort at this time.      Past Medical History:  Past Medical History:   Diagnosis Date   • History of anemia    • History of transfusion    • Hypertension    • Iron deficiency anemia 09/27/2021   • Lupus (systemic lupus erythematosus) (HCC) 07/30/2022   • Other specified nutritional anemias    • Seizures (HCC)    • Vitamin D deficiency 09/27/2021       Past Surgical History:   Procedure Laterality Date   • INSERTION HEMODIALYSIS CATHETER N/A 07/26/2022    Procedure: RIGHT TUNNELED DIALYSIS CATHETER PLACEMENT;  Surgeon: Diandra Adhikari MD;  Location: [facility];  Service: Vascular;  Laterality: N/A;   • NO PAST SURGERIES     • TONSILLECTOMY         Home Medications:   Medications Prior to Admission   Medication Sig Dispense Refill Last Dose   • carvedilol (Coreg) 25 MG tablet Take 1 tablet by mouth 2 (Two) Times a Day With Meals. 60 tablet 3    • hydroxychloroquine (PLAQUENIL) 200 MG tablet 200 mg.      • lacosamide (VIMPAT) 50 MG tablet tablet 50 mg.      • Methoxy PEG-Epoetin Beta (MIRCERA IJ) 225 mcg Every 14 (Fourteen) Days.      • minoxidil (LONITEN) 10 MG tablet Take 1 tablet by mouth Daily. 90 tablet 1    • mycophenolate (CELLCEPT) 500 MG tablet 1,000 mg.      • NIFEdipine CC (ADALAT CC) 30 MG 24 hr tablet Take  by mouth.      • NIFEdipine XL (PROCARDIA XL) 60 MG 24 hr tablet 60 mg.      • omeprazole (priLOSEC) 40 MG capsule Take 1 capsule by mouth Daily. 90 capsule 1    • ondansetron ODT (ZOFRAN-ODT) 4 MG disintegrating tablet 4 mg.      • predniSONE (DELTASONE) 10 MG tablet Take 2 tablets by mouth Daily. 60 tablet 11        Allergies:  No Known Allergies    Past Social History:  Social History     Socioeconomic History   • Marital status: Single   Tobacco Use   • Smoking status: Never   • Smokeless tobacco: Never   Vaping Use   • Vaping Use: Never used   Substance and Sexual Activity   • Alcohol use: Not Currently     Comment: social   • Drug use: Yes     Types: Marijuana   • Sexual  activity: Not Currently     Partners: Male     Birth control/protection: None       Past Family History:  Family History   Problem Relation Age of Onset   • Autoimmune disease Mother    • Anemia Mother    • Diabetes Sister    • Anemia Brother    • Diabetes Maternal Grandmother    • Hypertension Maternal Grandmother    • Cancer Maternal Grandmother    • Sickle cell anemia Cousin    • Malig Hyperthermia Neg Hx        Review of Systems:   All systems reviewed. Pertinent positives identified in HPI. All other systems are negative.    Objective:   Temp:  [98.1 °F (36.7 °C)-98.7 °F (37.1 °C)] 98.7 °F (37.1 °C)  Heart Rate:  [72-97] 89  Resp:  [16-20] 18  BP: (123-172)/() 140/90     Intake/Output Summary (Last 24 hours) at 2/15/2023 1007  Last data filed at 2/14/2023 2259  Gross per 24 hour   Intake 1200 ml   Output 2100 ml   Net -900 ml     Body mass index is 20.07 kg/m².      02/14/23  0445 02/14/23  1700 02/15/23  0652   Weight: 54.8 kg (120 lb 13 oz) 54.4 kg (120 lb) 54.7 kg (120 lb 9.5 oz)     Weight change: 0 kg (0 lb)    Physical Exam:   General Appearance:    Alert, cooperative, in no acute distress   Head:    Normocephalic, without obvious abnormality, atraumatic   Eyes:            Lids and lashes normal, conjunctivae and sclerae normal, no   icterus, no pallor, corneas clear, PERRLA   Ears:    Ears appear intact with no abnormalities noted   Neck:   No adenopathy, supple, trachea midline, no thyromegaly, no   carotid bruit, no JVD   Lungs:     Clear to auscultation,respirations regular, even and unlabored    Heart:    Regular rhythm and normal rate, normal S1 and S2, no murmur, no gallop, no rub, no click   Chest Wall:    No abnormalities observed   Abdomen:     Normal bowel sounds, no masses, no organomegaly, soft        non-tender, non-distended, no guarding, no rebound  tenderness   Extremities:   Moves all extremities well, no edema, no cyanosis, no redness   Pulses:   Pulses palpable and equal  bilaterally. Normal radial, carotid, femoral, dorsalis pedis and posterior tibial pulses bilaterally. Normal abdominal aorta   Skin:  Psychiatric:   No bleeding, bruising or rash    Alert and oriented x 3, normal mood and affect       Lab Review:   Results from last 7 days   Lab Units 02/15/23  0343 02/13/23  2208 02/13/23  1148   SODIUM mmol/L 135* 134* 135*   POTASSIUM mmol/L 3.9 4.6 4.7   CHLORIDE mmol/L 100 96* 96*   CO2 mmol/L 27.1 30.0* 30.0*   BUN mg/dL 8 17 14   CREATININE mg/dL 4.63* 7.53* 6.91*   GLUCOSE mg/dL 114* 108* 89   CALCIUM mg/dL 8.7 8.7 9.1   AST (SGOT) U/L 23  --  42*   ALT (SGPT) U/L 8  --  9     Results from last 7 days   Lab Units 02/14/23  2355 02/13/23  2208 02/13/23  1148   HSTROP T ng/L 58* 68* 66*     Results from last 7 days   Lab Units 02/15/23  0343 02/14/23  1253 02/14/23  0348 02/13/23  1148   WBC 10*3/mm3 1.92*  --  2.51* 2.46*   HEMOGLOBIN g/dL 9.4*  --  9.0* 10.4*   HEMATOCRIT % 31.1*  --  29.5* 35.0   PLATELETS 10*3/mm3 52* 31* 27* 26*                   Invalid input(s): LDLCALC  Results from last 7 days   Lab Units 02/13/23  1148   PROBNP pg/mL >70,000.0*           Echo EF Estimated  No results found for: ECHOEFEST    EKG:     Imaging:  Imaging Results (Most Recent)     Procedure Component Value Units Date/Time    CT Abdomen Pelvis With Contrast [[id number]] Collected: 02/14/23 1550     Updated: 02/14/23 1606    Narrative:      CT ABDOMEN PELVIS W CONTRAST-     HISTORY:  Abdominal pain/distention.      TECHNIQUE:  CT of the abdomen and pelvis was performed following the  administration of intravenous contrast. Radiation dose reduction  techniques were utilized, including automated exposure control and  exposure modulation based on body size.     COMPARISON:  CT abdomen and pelvis 07/23/2022     FINDINGS:     There is a partially imaged central line with the tip at the inferior  cavoatrial junction. The heart is enlarged. There is a new small  pericardial effusion. There are  small bilateral pleural effusions with  mild adjacent atelectasis, right greater than left, similar to prior.  The liver is normal in size. A hypodense focus in the right hepatic lobe  is too small to accurately characterize but not significantly changed.  The gallbladder is decompressed. The spleen is normal in size but has  increased in size from 07/23/2022, measuring 11.5 cm in AP dimension.  There is a diffusely mottled appearance of the splenic parenchyma. The  pancreas is diffusely hypodense. The adrenal glands are within normal  limits. The kidneys are symmetrically atrophic. There is no  hydronephrosis.  There is diffuse mesenteric edema, similar to prior, with improved small  volume free fluid. There are prominent bilateral inguinal lymph nodes.  The abdominal aorta is normal in caliber.   There is diffuse gastric wall thickening and mucosal hyperenhancement.  The appendix is visualized and within normal limits. The bladder is  collapsed and process, although there is diffuse bladder wall  thickening. The uterus is present. There is no adnexal mass.  There is mild diffuse body wall edema, slightly improved.          Impression:         1.  Findings of anasarca, including small bilateral pleural effusions  with mild adjacent atelectasis, a small pericardial effusion, diffuse  mesenteric edema and small volume free fluid, and mild diffuse body wall  edema.  2.  Diffuse gastric wall thickening and mucosal hyperenhancement,  incompletely assessed without oral contrast, which could be due to poor  distention but could also reflect a nonspecific gastritis or sequela of  the patient's overall fluid status.  3.  Diffusely hypodense pancreas. Correlate with serum lipase to  evaluate for possible pancreatitis.  4.  Increased spleen size with a diffuse mottled appearance of the  parenchyma, which cannot be compared to the prior noncontrast enhanced  examination and is nonspecific.   5.  Diffuse bladder wall thickening  may be due to poor distention,  however, correlate with urinalysis if there is clinical concern for  acute cystitis.     This report was finalized on 2/14/2023 4:03 PM by Dr. Shea Celestin M.D.       CT Head Without Contrast [[id number]] Collected: 02/13/23 1324     Updated: 02/13/23 1728    Narrative:      CT OF THE BRAIN WITHOUT CONTRAST 02/13/2023     HISTORY: Headache. Hypertension.     TECHNIQUE: Axial images were obtained through the brain without  intravenous contrast.     FINDINGS:  There is mild to moderate diffuse atrophy. There is some mild  decreased attenuation of the periventricular white matter particularly  in the right superior parietal region which may represent small vessel  ischemic disease. There is no evidence of acute infarction, hemorrhage  midline shift or mass effect.     There is mild mucosal thickening in the ethmoid sinuses.       Impression:      1. Atrophy and minimal changes of small vessel ischemic disease. These  findings are somewhat advanced for a patient of this age.  2. No acute intracranial process.  3. Mild ethmoid sinusitis.  4. Findings were discussed with Dr. Hawthorne.        Radiation dose reduction techniques were utilized, including automated  exposure control and exposure modulation based on body size.     This report was finalized on 2/13/2023 5:24 PM by Dr. Clyde Rayo M.D.       XR Chest 1 View [[id number]] Collected: 02/13/23 1216     Updated: 02/13/23 1221    Narrative:      XR CHEST 1 VW-     HISTORY: Female who is 32 years-old,  hypertension, short of breath     TECHNIQUE: Frontal view of the chest     COMPARISON: 07/26/2022     FINDINGS: Right-sided central venous catheter appears stable. The heart  is enlarged. Pulmonary vasculature is unremarkable. Obscuration of the  left hemidiaphragm suggests small left basilar  atelectasis/infiltrate/effusion is suggested. No  pneumothorax. No acute  osseous process.       Impression:      Small left basilar  atelectasis/infiltrate/effusion,  follow-up suggested. Cardiomegaly.     This report was finalized on 2/13/2023 12:18 PM by Dr. Norm Arshad M.D.             I personally viewed and interpreted the patient's EKG    AssessmentPlan:     1.  Pericardial effusion.  I reviewed the patient's echocardiogram from this admission and compared to her prior echocardiogram from 2/3.  On my review and my measurements of her pericardial effusion there does not appear to be any significant change in the size of her effusion.  It is still small to moderate in size.  It appears small in size anteriorly and moderate in size posteriorly.  No evidence of tamponade.  2.  Hypertension.  Elevated pressures on admission due to recent noncompliance to medications.  Pressure control overall has improved with reinitiation of her home regimen.  3.  ESRD.  On hemodialysis.  Nephrology is following.  4.  C. difficile infection.  On oral vancomycin.  5.  Chronic abdominal pain.  6.  Anasarca.  Noted on recent CT scan due to pleural and pericardial effusions, mesenteric edema, and mild diffuse body wall edema.  As above her echocardiogram shows normal left ventricular systolic function but she certainly does have thickened left ventricle which could put her at risk for restrictive physiology.  7.  Systemic lupus erythematosus.    - Based on review of her recent echocardiogram showing a stable appearing small to moderate-sized pericardial effusion with no evidence of tamponade I do not see any indication for intervention with pericardiocentesis at this time.  - Continue current management of hypertension.  Titrate medications as indicated.    Thank you for allowing me to participate in the care of Dee Herrera. Feel free to contact me directly with any further questions or concerns.    Juana Taylor MD  [facility]  02/15/23  10:07 EST

## 2023-02-15 NOTE — PROGRESS NOTES
Discussed with nephrologist.  Nephrology would like to stop minoxidil because of the pericardial effusion.  Nephrology also decreased CellCept and stopped Plaquenil because of progressive pancytopenia.  Appreciate Dr. Hyde help.

## 2023-02-15 NOTE — PROGRESS NOTES
[facility]  Inpatient Progress Note    Reason for Follow-up:  Abdominal pain    Subjective     Interval History:   Resting comfortably.  Blood pressure better controlled.  Still with complaints of abdominal pain-worse in the lower abdomen and reports it is relieved with defecation.  No further episodes of diarrhea.  C. difficile toxin PCR positive but antigen was negative suggestive of colonization.    White count down to 1.9.  Hemoglobin stable.  Platelet count 52,000.  Acute hepatitis panel negative.  Ceruloplasmin 17.    CT A/P with anasarca, small bilateral pleural effusions, small pericardial effusion, hypodense pancreas, splenomegaly, gastric wall thickening with mucosal hyperenhancement, too small to characterize hypodense focus within the right hepatic lobe    Current Facility-Administered Medications:   •  acetaminophen (TYLENOL) tablet 650 mg, 650 mg, Oral, Q4H PRN, Laurence Cedeno MD, 650 mg at 02/13/23 2138  •  carvedilol (COREG) tablet 25 mg, 25 mg, Oral, Q12H, Eduardo Hendricks MD, 25 mg at 02/15/23 0842  •  famotidine (PEPCID) tablet 20 mg, 20 mg, Oral, Daily, Lakisha Hunt MD, 20 mg at 02/15/23 0843  •  gabapentin (NEURONTIN) capsule 100 mg, 100 mg, Oral, Q12H, Lakisha Hunt MD, 100 mg at 02/15/23 1008  •  heparin (porcine) injection 4,000 Units, 4,000 Units, Intracatheter, PRN, Eduardo Hendricks MD, 4,000 Units at 02/14/23 2355  •  hydrALAZINE (APRESOLINE) injection 10 mg, 10 mg, Intravenous, Q6H PRN, Lakisha Hunt MD, 10 mg at 02/15/23 0236  •  hydroxychloroquine (PLAQUENIL) tablet 200 mg, 200 mg, Oral, Q24H, Laurence Cedeno MD, 200 mg at 02/15/23 0842  •  lacosamide (VIMPAT) tablet 50 mg, 50 mg, Oral, Q12H, Laurence Cedeno MD, 50 mg at 02/15/23 0842  •  melatonin tablet 3 mg, 3 mg, Oral, Nightly PRN, Alycial, Laurence Garcia MD, 3 mg at 02/13/23 2138  •  minoxidil (LONITEN) tablet 20 mg, 20 mg, Oral, Q12H, Lakisha Hunt MD, 20  mg at 02/15/23 0842  •  mycophenolate (CELLCEPT) tablet 1,000 mg, 1,000 mg, Oral, Q12H, Laurence Cedeno MD, 1,000 mg at 02/15/23 0842  •  NIFEdipine XL (PROCARDIA XL) 24 hr tablet 60 mg, 60 mg, Oral, BID, Laurence Cedeno MD, 60 mg at 02/15/23 0842  •  ondansetron (ZOFRAN) tablet 4 mg, 4 mg, Oral, Q6H PRN **OR** ondansetron (ZOFRAN) injection 4 mg, 4 mg, Intravenous, Q6H PRN, Laurence Cedeno MD, 4 mg at 02/15/23 0237  •  Pharmacy Consult, , Does not apply, Continuous PRN, Lakisha Hunt MD  •  predniSONE (DELTASONE) tablet 10 mg, 10 mg, Oral, Daily, Hair Mckeon MD, 10 mg at 02/15/23 0843  •  [COMPLETED] Insert Peripheral IV, , , Once **AND** sodium chloride 0.9 % flush 10 mL, 10 mL, Intravenous, PRN, Anthony Hawthorne MD, 10 mL at 02/15/23 0841  •  vancomycin (VANCOCIN) capsule 125 mg, 125 mg, Oral, Q6H, Lakisha Hunt MD, 125 mg at 02/15/23 0619  Review of Systems:    Constitutional: No fevers, chills, sweats   Respiratory: No shortness of breath, cough   Cardiovascular: No Chest pain, palpitations   Gastrointestinal: No nausea, vomiting, diarrhea   Genitourinary: No hematuria, dysuria    Objective     Vital Signs  Temp:  [98.1 °F (36.7 °C)-98.7 °F (37.1 °C)] 98.7 °F (37.1 °C)  Heart Rate:  [72-97] 89  Resp:  [16-20] 18  BP: (123-172)/() 140/90  Body mass index is 20.07 kg/m².    Intake/Output Summary (Last 24 hours) at 2/15/2023 1022  Last data filed at 2/14/2023 2259  Gross per 24 hour   Intake 1200 ml   Output 2100 ml   Net -900 ml     No intake/output data recorded.     Physical Exam:   General: Awake, alert and conversive. No acute distress.   Eyes: Normal lids and lashes, no scleral icterus.   Neck: Supple and symmetric. Trachea midline.    Skin: Warm and dry, not jaundiced.    Cardiovascular: Regular rate and rhythm.  Right TDC.   Pulm: Quiet, even, nonlabored breathing.    Abdomen: Soft, distended, mild diffuse tenderness. No rebound or guarding. Bowel sounds present  in all 4 quadrants.   Extremities: No rashes or edema.   Psychiatric: Appropriate mood and affect. Cooperative.     Results Review:     I reviewed the patient's new clinical results.    Results from last 7 days   Lab Units 02/15/23  0343 02/14/23  1253 02/14/23  0348 02/13/23  1148   WBC 10*3/mm3 1.92*  --  2.51* 2.46*   HEMOGLOBIN g/dL 9.4*  --  9.0* 10.4*   HEMATOCRIT % 31.1*  --  29.5* 35.0   PLATELETS 10*3/mm3 52* 31* 27* 26*     Results from last 7 days   Lab Units 02/15/23  0343 02/13/23  2208 02/13/23  1148   SODIUM mmol/L 135* 134* 135*   POTASSIUM mmol/L 3.9 4.6 4.7   CHLORIDE mmol/L 100 96* 96*   CO2 mmol/L 27.1 30.0* 30.0*   BUN mg/dL 8 17 14   CREATININE mg/dL 4.63* 7.53* 6.91*   CALCIUM mg/dL 8.7 8.7 9.1   BILIRUBIN mg/dL 0.6  --  1.2   ALK PHOS U/L 40  --  41   ALT (SGPT) U/L 8  --  9   AST (SGOT) U/L 23  --  42*   GLUCOSE mg/dL 114* 108* 89         Lab Results   Lab Value Date/Time    LIPASE 22 02/14/2023 2355    LIPASE 47 11/25/2022 2109    LIPASE 27 11/22/2022 1059    LIPASE 32 11/21/2022 2030       Radiology:  CT Abdomen Pelvis With Contrast   Final Result       1.  Findings of anasarca, including small bilateral pleural effusions   with mild adjacent atelectasis, a small pericardial effusion, diffuse   mesenteric edema and small volume free fluid, and mild diffuse body wall   edema.   2.  Diffuse gastric wall thickening and mucosal hyperenhancement,   incompletely assessed without oral contrast, which could be due to poor   distention but could also reflect a nonspecific gastritis or sequela of   the patient's overall fluid status.   3.  Diffusely hypodense pancreas. Correlate with serum lipase to   evaluate for possible pancreatitis.   4.  Increased spleen size with a diffuse mottled appearance of the   parenchyma, which cannot be compared to the prior noncontrast enhanced   examination and is nonspecific.    5.  Diffuse bladder wall thickening may be due to poor distention,   however, correlate  with urinalysis if there is clinical concern for   acute cystitis.       This report was finalized on 2/14/2023 4:03 PM by Dr. Shea Celestin M.D.          CT Head Without Contrast   Final Result   1. Atrophy and minimal changes of small vessel ischemic disease. These   findings are somewhat advanced for a patient of this age.   2. No acute intracranial process.   3. Mild ethmoid sinusitis.   4. Findings were discussed with Dr. Hawthorne.           Radiation dose reduction techniques were utilized, including automated   exposure control and exposure modulation based on body size.       This report was finalized on 2/13/2023 5:24 PM by Dr. Clyde Rayo M.D.          XR Chest 1 View   Final Result   Small left basilar atelectasis/infiltrate/effusion,   follow-up suggested. Cardiomegaly.       This report was finalized on 2/13/2023 12:18 PM by Dr. Norm Arshad M.D.              Assessment & Plan     Patient Active Problem List   Diagnosis   • Vitamin D deficiency   • Iron deficiency anemia   • Morning stiffness of joints   • Iron deficiency anemia, unspecified iron deficiency anemia type   • Thrombocytopenia (HCC)   • Acute renal failure (ARF) (HCC)   • Hypertension secondary to other renal disorders   • Pancytopenia (HCC)   • Hypoalbuminemia   • Elevated troponin   • Volume overload   • Ear drainage right   • Hypertensive urgency   • T.T.P. syndrome (HCC)   • Systemic lupus erythematosus (HCC)   • Lupus nephritis, ISN/RPS class IV (HCC)   • Hypokalemia   • Hypocalcemia   • COVID-19   • Hospital discharge follow-up   • Stage 5 chronic kidney disease (HCC)   • Pericardial effusion   • Cardiac cirrhosis   • Pancreatitis   • Duodenitis   • Regional enteritis of small bowel (HCC)   • Amyloid disease (HCC)   • Hypertensive urgency   • Pericardial effusion   • ESRD (end stage renal disease) (HCC)   • Hemodialysis status (HCC)   • Seizure disorder (HCC)   • Elevated liver function tests   • C. difficile colitis        Assessment:  1. Chronic abdominal pain, diffuse  2. Hypertension  3. End-stage renal disease, on hemodialysis-follows with [facility] for possible transplant  4. SLE  5. Diarrhea, resolved C. difficile toxin PCR positive, antigen negative suggestive of colonization  6. Anasarca  7. Abnormal imaging of pancreas suggestive of pancreatitis.      Plan:  · Clinically symptoms not consistent with pancreatitis.  · Await full hepatic serological work-up.  · Add simethicone.  · Could consider hematology evaluation.     I discussed the patient's findings and my recommendations with patient, family and Dr. Cotton.    Marcos dictation used throughout this note.            JACKIE Rodríguez  [facility]  [address] [place] [address]  Office: [phone]

## 2023-02-15 NOTE — NURSING NOTE
RRT called at 0950 r/t pt stating she had severe headache, new numbness in extremities, and feeling like she might have a seizure. See RRT note for details.     Gabapentin had previously been ordered by MD for headache - med administered.     Pt stated she felt better 20 minutes after gabapentin    Dr Hunt notified.     Will continue to monitor.

## 2023-02-15 NOTE — PROGRESS NOTES
[facility] Progress Note      Patient Name: Dee Herrera  : 1990  MRN: [id number]  Primary Care Physician:  Bobby Corey MD  Date of admission: 2023    Subjective     Interval History:   Follow up ESRD.  Very sleepy.  Says she ate a little breakfast. Abdomen still painful. Bowels moving. Finished HD very early this am .  Pericardail effusion on echo. Dr. Taylor's eval noted .    Review of Systems:   As noted above    Objective     Vitals:   Temp:  [98.3 °F (36.8 °C)-98.8 °F (37.1 °C)] 98.8 °F (37.1 °C)  Heart Rate:  [74-99] 99  Resp:  [16-20] 18  BP: (123-172)/() 134/95    Intake/Output Summary (Last 24 hours) at 2/15/2023 1437  Last data filed at 2/15/2023 0842  Gross per 24 hour   Intake 1320 ml   Output 2100 ml   Net -780 ml       Physical Exam:    General Appearance: sleeping very soundly. Chronically ill . no acute distress   Skin: warm and dry  HEENT: oral mucosa dryl, nonicteric sclera  Neck: RIJ TDC  Lungs: Clear to auscultation.   Heart: RRR, 2/6 systm, no s3 or rub  Abdomen: soft, nontender, distended.. body wall edema .+ bs   : no palpable bladder  Extremities: no edema.  Neuro: normal speech and mental status     Scheduled Meds:     carvedilol, 25 mg, Oral, Q12H  famotidine, 20 mg, Oral, Daily  gabapentin, 100 mg, Oral, Q12H  hydroxychloroquine, 200 mg, Oral, Q24H  lacosamide, 50 mg, Oral, Q12H  minoxidil, 20 mg, Oral, Q12H  mycophenolate, 1,000 mg, Oral, Q12H  NIFEdipine XL, 60 mg, Oral, BID  predniSONE, 10 mg, Oral, Daily  vancomycin, 125 mg, Oral, Q6H      IV Meds:   Pharmacy Consult,         Results Reviewed:   I have personally reviewed the results from the time of this admission to 2/15/2023 14:37 EST     Results from last 7 days   Lab Units 02/15/23  0343 02/1323  1148   SODIUM mmol/L 135* 134* 135*   POTASSIUM mmol/L 3.9 4.6 4.7   CHLORIDE mmol/L 100 96* 96*   CO2 mmol/L 27.1 30.0* 30.0*   BUN mg/dL 8 17 14   CREATININE mg/dL  4.63* 7.53* 6.91*   CALCIUM mg/dL 8.7 8.7 9.1   BILIRUBIN mg/dL 0.6  --  1.2   ALK PHOS U/L 40  --  41   ALT (SGPT) U/L 8  --  9   AST (SGOT) U/L 23  --  42*   GLUCOSE mg/dL 114* 108* 89       Estimated Creatinine Clearance: 15.3 mL/min (A) (by C-G formula based on SCr of 4.63 mg/dL (H)).                Results from last 7 days   Lab Units 02/15/23  0343 02/14/23  1253 02/14/23  0348 02/13/23  1148   WBC 10*3/mm3 1.92*  --  2.51* 2.46*   HEMOGLOBIN g/dL 9.4*  --  9.0* 10.4*   PLATELETS 10*3/mm3 52* 31* 27* 26*             Assessment / Plan     ASSESSMENT:  1. ESRD. Dialysis tomorrow.  Her waste products are controlled with dialysis so I think that likely reason for her pericardial effusion is minoxidil.   2. HTN, better controlled on meds.  She has a moderate pericardial effusion, so I think that we should stop the minoxidil.   Will add hydralazine to compensate for dc of minoxidil.   3. SLE on immunosuppression.  I think we should reduce the cellcept with her pancytopenia.   4. Anemia CKD. Pancytopenia. Likely med related on plaquenil, cellcept for SLE.  Her WBC 10/22- 11/22 at [facility] has ranged from 3.5-6.4  5. Seizure disorder.   6. C. Dif. Po vanc. PCR positive, but antigen negative. GI thinks colonization.     PLAN:  1. Reduce cellcept.   2. Stop minoxidil.   3. Dialysis tomorrow.  4. Will try to look at rheumatology records regarding plaquenil and cellcept .  Regi Hyde MD  02/15/23  14:37 EST    [facility]  [phone]

## 2023-02-15 NOTE — CONSULTS
Subjective     REASON FOR CONSULTATION: pancytopenia, , SLE, CKD, LIVER TEST ABNORMALITY, SPLENOMEGALY, ANASARCA, HYPERTENSIVE URGENCY.  Provide an opinion on any further workup or treatment                             REQUESTING PHYSICIAN:    Lakisha Hunt MD       Attending     Since 2/14/2023     [phone]         RECORDS OBTAINED:  Records of the patients history including those obtained from the referring provider were reviewed and summarized in detail.          History of Present Illness     On 02/15/2023 this 32-year-old  female who has been seen by my partner, Tiesha Ocasio MD, in the past has been admitted to the hospital with a hypertensive emergency and we have been consulted in regard to pancytopenia. The patient is known for having systemic lupus erythematosus after a diagnosis was made through a kidney biopsy and serological analysis. She has been in therapy for this. Unfortunately her kidney function has been terrible, the patient is now undergoing dialysis and she continues undergoing therapy for her lupus. The patient has been admitted to the hospital because she had a blood pressure systolic of 230 in outside facility clinic and obviously for this reason was admitted for proper control. The Nephrology team already is taking care of this.     At the time of the visit the patient was eating fried chicken by the bucket and she was not having any symptoms at all. She was not having any pain, shortness of breath, cough, sputum production, nausea, vomiting, abdominal distention or passage of blood in the stool. She was not having any rashes in the skin and she wanted to go home.     She has not had any recent fever or infection. Her dialysis catheter has remained functional.             HEMATOLOGY HISTORY:  ASSESSMENT/PLAN:       *EVITA  • Renal function was normal 3 months ago  • Creatinine elevated at 7.74 on 7/20/2022  • She has been started on plasma exchange for possible  TTP-HUS  • 7/22/2022 creatinine 7.04.  • Nephrology consulted.  • Noted to have low C3 as well as C4 which is not very typical of TTP/HUS  • Due to this reason and also due to the fact that she has poorly controlled hypertension need to rule out other causes of microangiopathic anemia..  • Discussed with Dr. Martínez and plan is for renal biopsy on Monday.  • We will hold off on plasma exchange  • Autoimmune work-up consistent with SLE.  EVITA likely secondary to lupus nephritis  • Started on high-dose Solu-Medrol.  • Renal function somewhat worse today at 8.1.  • Continue dialysis.  Await renal biopsy  • Patient's renal biopsy is consistent with lupus nephritis  • Patient currently on prednisone 60 mg daily and nephrology started mycophenolate..        *Thrombocytopenia  • 4/21/2022 platelet count was normal at 272,000  • 7/20/2022 platelet count decreased to 79,000.  Hemoglobin low at 6.2.  MCV 65.3.  • Peripheral smear suggestive of schistocytes.  • Admitted to the hospital and started on plasma exchange.  • 7/20/2022 B12 and folate normal.  • Fibrinogen somewhat low today which is likely secondary to plasmapheresis  • Haptoglobin normal  • 7/24/2022-platelet count 82,000.  • Platelets of 92 today  • Platelets have improved 220     *Anemia  • Chronic anemia with baseline hemoglobin around 7.  • Noted to have microcytic anemia.  Previous iron studies consistent with iron deficiency.  • Hemoglobin acutely dropped to 5.2 on 7/20/2022  • Transfused 4 units of PRBC and hemoglobin improved to 9.6.  • LDH elevated at 398  • Reticulocyte count normal  • Presentation concerning for hemolysis/microangiopathic anemia  • Started on plasma exchange, being held today due to dialysis.  • Hemoglobin somewhat lower today at 8.3.    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  • Need to rule out other causes of microangiopathic hemolytic anemia  • Possible renal biopsy for the same  • [id number] ordered prior to initiating plasma exchange however this was not drawn until after the first exchange.  Therefore this would be unreliable.  • Recommend continuing plasma exchange till renal biopsy results available.  • Prednisone 1 mg/kg, continue current dose of steroid  • GIANFRANCO noted to be positive  • Antidouble-stranded DNA greater than 300  • RNP antibodies greater than 8, Phillips antibodies greater than 8,SIENA antibodies greater than 8, and tetramethrin antibodies greater than 8     *Hypertension  • Very difficult to control.  • Echocardiogram shows an infiltrative process of the myocardium such as amyloid or chronic renal disease.  • Currently on Cardene drip     *Possible infiltrative process of the myocardium  • Raises concern for amyloidosis  • SPEP performed in the past negative.  • Elevated beta-2 microglobulin, ?  Secondary to renal dysfunction  • Cardiology consulted     Recommendations  1.hemodialysis per nephrology  2.given high blood pressure renal biopsy not done today hopefully can be done tomorrow   3.  Currently on pulse dose of steroids   4.we will hold off on Rituxan at this time  5.  Patient started on mycophenolate for lupus nephritis  6.  Patient will require transfusion     Patient is critically ill.     Tiesha Ocasio MD        Past Medical History:   Diagnosis Date   • History of anemia    • History of transfusion    • Hypertension    • Iron deficiency anemia 09/27/2021   • Lupus (systemic lupus erythematosus) (HCC) 07/30/2022   • Other specified nutritional anemias    • Seizures ([facility])    • Vitamin D  deficiency 09/27/2021        Past Surgical History:   Procedure Laterality Date   • INSERTION HEMODIALYSIS CATHETER N/A 07/26/2022    Procedure: RIGHT TUNNELED DIALYSIS CATHETER PLACEMENT;  Surgeon: Diandra Adhikari MD;  Location: [facility] OR;  Service: Vascular;  Laterality: N/A;   • NO PAST SURGERIES     • TONSILLECTOMY          No current facility-administered medications on file prior to encounter.     Current Outpatient Medications on File Prior to Encounter   Medication Sig Dispense Refill   • carvedilol (Coreg) 25 MG tablet Take 1 tablet by mouth 2 (Two) Times a Day With Meals. 60 tablet 3   • hydroxychloroquine (PLAQUENIL) 200 MG tablet 200 mg.     • lacosamide (VIMPAT) 50 MG tablet tablet 50 mg.     • Methoxy PEG-Epoetin Beta (MIRCERA IJ) 225 mcg Every 14 (Fourteen) Days.     • minoxidil (LONITEN) 10 MG tablet Take 1 tablet by mouth Daily. 90 tablet 1   • mycophenolate (CELLCEPT) 500 MG tablet 1,000 mg.     • NIFEdipine CC (ADALAT CC) 30 MG 24 hr tablet Take  by mouth.     • NIFEdipine XL (PROCARDIA XL) 60 MG 24 hr tablet 60 mg.     • omeprazole (priLOSEC) 40 MG capsule Take 1 capsule by mouth Daily. 90 capsule 1   • ondansetron ODT (ZOFRAN-ODT) 4 MG disintegrating tablet 4 mg.     • predniSONE (DELTASONE) 10 MG tablet Take 2 tablets by mouth Daily. 60 tablet 11        ALLERGIES:  No Known Allergies     Social History     Socioeconomic History   • Marital status: Single   Tobacco Use   • Smoking status: Never   • Smokeless tobacco: Never   Vaping Use   • Vaping Use: Never used   Substance and Sexual Activity   • Alcohol use: Not Currently     Comment: social   • Drug use: Yes     Types: Marijuana   • Sexual activity: Not Currently     Partners: Male     Birth control/protection: None        Family History   Problem Relation Age of Onset   • Autoimmune disease Mother    • Anemia Mother    • Diabetes Sister    • Anemia Brother    • Diabetes Maternal Grandmother    • Hypertension Maternal Grandmother     • Cancer Maternal Grandmother    • Sickle cell anemia Cousin    • Malig Hyperthermia Neg Hx         Review of Systems   Constitutional: Positive for activity change and fatigue.   HENT: Negative.    Eyes: Negative.    Respiratory: Positive for cough and shortness of breath.    Cardiovascular: Negative.    Gastrointestinal: Negative.    Genitourinary: Negative.    Musculoskeletal: Negative.    Skin: Negative.    Neurological: Negative.    Hematological: Negative.    Psychiatric/Behavioral: Negative.         Objective     Vitals:    02/15/23 1006 02/15/23 1009 02/15/23 1012 02/15/23 1015   BP: 140/96      BP Location:       Patient Position:       Pulse: 87 88 86 87   Resp:       Temp:       TempSrc:       SpO2: 100% 100% 96% (!) 84%   Weight:       Height:         Current Status 11/2/2022   ECOG score 0       Physical Exam  Constitutional:       Appearance: She is ill-appearing.   HENT:      Head: Normocephalic.   Eyes:      General: No scleral icterus.  Cardiovascular:      Rate and Rhythm: Normal rate and regular rhythm.      Pulses: Normal pulses.      Heart sounds: Normal heart sounds.   Pulmonary:      Effort: Pulmonary effort is normal. No respiratory distress.      Breath sounds: No wheezing or rales.      Comments: DECREASED B S BILATERAL  Abdominal:      General: There is distension.      Palpations: Abdomen is soft.      Tenderness: There is no abdominal tenderness. There is no guarding or rebound.      Hernia: No hernia is present.      Comments: ASCITIES   Musculoskeletal:      Right lower leg: Edema present.      Left lower leg: Edema present.   Lymphadenopathy:      Cervical: No cervical adenopathy.   Skin:     General: Skin is warm and dry.      Findings: No lesion or rash.   Neurological:      Mental Status: She is alert. Mental status is at baseline.   Psychiatric:         Mood and Affect: Mood normal.         Behavior: Behavior normal.           RECENT LABS:  Hematology WBC   Date Value Ref Range  Status   02/15/2023 1.92 (C) 3.40 - 10.80 10*3/mm3 Final     RBC   Date Value Ref Range Status   02/15/2023 3.94 3.77 - 5.28 10*6/mm3 Final     Hemoglobin   Date Value Ref Range Status   02/15/2023 9.4 (L) 12.0 - 15.9 g/dL Final     Hematocrit   Date Value Ref Range Status   02/15/2023 31.1 (L) 34.0 - 46.6 % Final     Platelets   Date Value Ref Range Status   02/15/2023 52 (L) 140 - 450 10*3/mm3 Final      CT ABDOMEN PELVIS W CONTRAST-     HISTORY:  Abdominal pain/distention.      TECHNIQUE:  CT of the abdomen and pelvis was performed following the  administration of intravenous contrast. Radiation dose reduction  techniques were utilized, including automated exposure control and  exposure modulation based on body size.     COMPARISON:  CT abdomen and pelvis 07/23/2022     FINDINGS:     There is a partially imaged central line with the tip at the inferior  cavoatrial junction. The heart is enlarged. There is a new small  pericardial effusion. There are small bilateral pleural effusions with  mild adjacent atelectasis, right greater than left, similar to prior.  The liver is normal in size. A hypodense focus in the right hepatic lobe  is too small to accurately characterize but not significantly changed.  The gallbladder is decompressed. The spleen is normal in size but has  increased in size from 07/23/2022, measuring 11.5 cm in AP dimension.  There is a diffusely mottled appearance of the splenic parenchyma. The  pancreas is diffusely hypodense. The adrenal glands are within normal  limits. The kidneys are symmetrically atrophic. There is no  hydronephrosis.  There is diffuse mesenteric edema, similar to prior, with improved small  volume free fluid. There are prominent bilateral inguinal lymph nodes.  The abdominal aorta is normal in caliber.   There is diffuse gastric wall thickening and mucosal hyperenhancement.  The appendix is visualized and within normal limits. The bladder is  collapsed and process, although  there is diffuse bladder wall  thickening. The uterus is present. There is no adnexal mass.  There is mild diffuse body wall edema, slightly improved.        IMPRESSION:     1.  Findings of anasarca, including small bilateral pleural effusions  with mild adjacent atelectasis, a small pericardial effusion, diffuse  mesenteric edema and small volume free fluid, and mild diffuse body wall  edema.  2.  Diffuse gastric wall thickening and mucosal hyperenhancement,  incompletely assessed without oral contrast, which could be due to poor  distention but could also reflect a nonspecific gastritis or sequela of  the patient's overall fluid status.  3.  Diffusely hypodense pancreas. Correlate with serum lipase to  evaluate for possible pancreatitis.  4.  Increased spleen size with a diffuse mottled appearance of the  parenchyma, which cannot be compared to the prior noncontrast enhanced  examination and is nonspecific.   5.  Diffuse bladder wall thickening may be due to poor distention,  however, correlate with urinalysis if there is clinical concern for  acute cystitis.     This report was finalized on 2/14/2023 4:03 PM by Dr. Shea Celestin M.D.     .92 Low Critical    2.51 Low   2.46 Low   2.3 Low  R  2.03 Low   3.26 Low      RBC  3.77 - 5.28 10*6/mm3 3.94   3.78  4.31  4.44 R, CM  4.90  4.35    Hemoglobin  12.0 - 15.9 g/dL 9.4 Low    9.0 Low   10.4 Low   9.6 Low  R, CM  10.1 Low   9.3 Low     Hematocrit  34.0 - 46.6 % 31.1 Low    29.5 Low   35.0  32.1 Low   35.7  32.0 Low     MCV  79.0 - 97.0 fL 78.9 Low    78.0 Low   81.2  72 Low  R  72.9 Low   73.6 Low     MCH  26.6 - 33.0 pg 23.9 Low    23.8 Low   24.1 Low   21.6 Low   20.6 Low   21.4 Low     MCHC  31.5 - 35.7 g/dL 30.2 Low    30.5 Low   29.7 Low   29.9 Low   28.3 Low   29.1 Low     RDW  12.3 - 15.4 % 19.5 High    19.9 High   19.7 High   22.1 High  R  21.9 High   23.9 High     RDW-SD  37.0 - 54.0 fl 53.3   52.9  55.4 High    56.0 High   62.4 High     MPV           Comment: Unable to obtain a result    Platelets  140 - 450 10*3/mm3 52 Low   31 Low Critical   27 Low Critical   26 Low Critical   59 Low  R, CM  99 Low   119 Low     Resulting Agency  [facility]  [facility]  [facility]  [facility]              Specimen Collected: 02/15/23 03:43 EST Last Resulted: 02/15/23 04:33 EST         Lab Flowsheet      Order Details      View Encounter      Lab and Collection Details      Routing      Result History     View Encounter Conversation        CM=Additional comments  R=Reference range differs from displayed range        Result Care Coordination      Patient Communication     2/15/2023  6:33 PM Release Now   Not seen Back to Top               CBC & Differential (Order [id number])  Linked Results    Procedure Abnormality Status   CBC Auto Differential Critical Critical   Final result       CBC Auto Differential (Order [id number])  Assessment & Plan   On 02/15/2023 this 32-year-old female has been admitted to the hospital with hypertensive crisis being treated by Nephrology and successfully coming down in regard to blood pressure. We have been consulted because the patient has pancytopenia with low white count, hemoglobin and platelets. These numbers are a little bit worse than the number that she had before when she had again pancytopenia and she was worked up by partner, Dr. Tiesha Ocasio, at the time that her lupus diagnosis was made last year in 2022.     It is very obviously radiologically speaking that the patient has large volume expansion raising blood pressure and also producing ascites, liver enlargement, splenomegaly and very likely sequestration of white cells and platelets by her enlarged spleen. It is impossible to separate how much of the pancytopenia is also related to lupus per se because this condition also typically can produce autoimmune thrombocytopenia and autoimmune neutropenia. I am sure that the patient has been receiving  erythropoietin and IV iron by the nephrology team. I think it is worth it to obtain an immature platelet fraction and reticulated hemoglobin and  things in that way.     Besides all these things obviously the patient needs to have frequent dialysis per Nephrology team to remove excessive amount of water that she has. The patient is eating at the time of the visit by the buckets, fried chicken. Raises the question if she grabs the idea of sodium restriction in her diet and things of this nature. To me the idea is that she does not have it. In any event, this will be addressed I am sure by the Nephrology team.     Therefore I went ahead and ordered an immature platelet fraction, obtained also an LDH and also obtained a reticulated hemoglobin. Right now I do not believe that the patient needs to have any blood products of any nature, neither do I need to pursue bone marrow testing.     We will review hematological parameters including CBC differential on daily basis. I do not expect these numbers to change too much over time.    I discussed all these facts with the patient's mother and grandmother present in the room.    DURING THE VISIT WITH THE PATIENT TODAY , PATIENT HAD FACE MASK, I HAD PROPER PROTECTIVE EQUIPMENT, AND I DID HAND HYGIENE WITH SOAP AND WATER BEFORE AND AFTER THE VISIT.

## 2023-02-15 NOTE — CODE DOCUMENTATION
"RRT called to assess patient c/o decreased sensation x4 and HA; Pt is here for htn emergency- BP stable at 140/90; HR 86; Received HD yesterday; Pt reported HA 5/10- generalized- MD already aware of HA and new medication ordered;     Pt initially resistant to NIH assessment, stating she is \"unresponsive\"; Pt complied with NIH earning a score of 1; for mild decreased sensation- pt able to detect soft tactile stimulation x4 extremities and bilaterally on face; Pt currently on Vimpat, which she received approximately an hour ago;   Staff advised to administer MD orders for HA and reassess in an hour;   "

## 2023-02-15 NOTE — PROGRESS NOTES
Name: Dee Herrera ADMIT: 2023   : 1990  PCP: Bobby Corey MD    MRN: [id number] LOS: 2 days   AGE/SEX: 32 y.o. female  ROOM: [facility]/     Subjective   Subjective   Patient reports feeling well today.  No abdominal pain.  Continues with mild abdominal distention.  No nausea or vomiting.  No bowel movement since admission.  No fever or chills.    Review of Systems  Cardiovascular/respiratory.  No chest pain/no palpitations/no shortness of breath .  Does not produce urine.  CNS.  Positive dizziness and headache.  No seizures.  No focal neurological deficits symptoms.     Objective   Objective   Vital Signs  Temp:  [98.1 °F (36.7 °C)-98.7 °F (37.1 °C)] 98.7 °F (37.1 °C)  Heart Rate:  [72-93] 93  Resp:  [16-20] 18  BP: (123-172)/() 133/89  SpO2:  [97 %-100 %] 100 %  on   ;   Device (Oxygen Therapy): room air    Intake/Output Summary (Last 24 hours) at 2/15/2023 0857  Last data filed at 2023 2259  Gross per 24 hour   Intake 1320 ml   Output 2100 ml   Net -780 ml     Body mass index is 20.07 kg/m².      23  0445 23  1700 02/15/23  0652   Weight: 54.8 kg (120 lb 13 oz) 54.4 kg (120 lb) 54.7 kg (120 lb 9.5 oz)     Physical Exam    General.  Middle-aged female.  She is alert and oriented x3.  No apparent pain/distress/diaphoresis.  Normal mood and affect.  Eyes.  Pupils equal round and reactive.  Intact extraocular musculature.  No pallor or jaundice.    Oral cavity.  Moist mucous membrane.  Neck.  Supple.  No JVD.  No lymphadenopathy or thyromegaly.  Cardiovascular.  Regular rate and rhythm and grade 2 systolic murmur.  Chest.  Poor bilateral air entry with no added sounds  Abdomen.  Soft lax.  No tenderness.  Mild distention.  Positive bowel sounds.  No guarding or rebound.  No organomegaly.  Extremities.  No clubbing/cyanosis/edema.  CNS.  No acute focal neurological deficits    Results Review:      Results from last 7 days   Lab Units 02/15/23  0343 23  2203  02/13/23  1148   SODIUM mmol/L 135* 134* 135*   POTASSIUM mmol/L 3.9 4.6 4.7   CHLORIDE mmol/L 100 96* 96*   CO2 mmol/L 27.1 30.0* 30.0*   BUN mg/dL 8 17 14   CREATININE mg/dL 4.63* 7.53* 6.91*   GLUCOSE mg/dL 114* 108* 89   CALCIUM mg/dL 8.7 8.7 9.1   AST (SGOT) U/L 23  --  42*   ALT (SGPT) U/L 8  --  9     Estimated Creatinine Clearance: 15.3 mL/min (A) (by C-G formula based on SCr of 4.63 mg/dL (H)).          Results from last 7 days   Lab Units 02/14/23  2355 02/13/23  2208 02/13/23  1148   HSTROP T ng/L 58* 68* 66*     Results from last 7 days   Lab Units 02/13/23  1148   PROBNP pg/mL >70,000.0*               Invalid input(s):  PHOS        Invalid input(s): LDLCALC  Results from last 7 days   Lab Units 02/15/23  0343 02/14/23  1253 02/14/23  0348 02/13/23  1148   WBC 10*3/mm3 1.92*  --  2.51* 2.46*   HEMOGLOBIN g/dL 9.4*  --  9.0* 10.4*   HEMATOCRIT % 31.1*  --  29.5* 35.0   PLATELETS 10*3/mm3 52* 31* 27* 26*   MCV fL 78.9*  --  78.0* 81.2   MCH pg 23.9*  --  23.8* 24.1*   MCHC g/dL 30.2*  --  30.5* 29.7*   RDW % 19.5*  --  19.9* 19.7*   RDW-SD fl 53.3  --  52.9 55.4*   NEUTROPHIL % %  --   --   --  50.8   LYMPHOCYTE % %  --   --   --  29.7   MONOCYTES % %  --   --   --  18.3*   EOSINOPHIL % %  --   --   --  0.0*   BASOPHIL % %  --   --   --  0.4   NEUTROS ABS 10*3/mm3  --   --   --  1.25*   LYMPHS ABS 10*3/mm3  --   --   --  0.73   MONOS ABS 10*3/mm3  --   --   --  0.45   EOS ABS 10*3/mm3  --   --   --  0.00   BASOS ABS 10*3/mm3  --   --   --  0.01                     Results from last 7 days   Lab Units 02/14/23  2355   LIPASE U/L 22         Results from last 7 days   Lab Units 02/14/23  0948   ADENOVIRUS  Not Detected               Imaging:  Imaging Results (Last 24 Hours)     Procedure Component Value Units Date/Time    CT Abdomen Pelvis With Contrast [[id number]] Collected: 02/14/23 1550     Updated: 02/14/23 1606    Narrative:      CT ABDOMEN PELVIS W CONTRAST-     HISTORY:  Abdominal pain/distention.       TECHNIQUE:  CT of the abdomen and pelvis was performed following the  administration of intravenous contrast. Radiation dose reduction  techniques were utilized, including automated exposure control and  exposure modulation based on body size.     COMPARISON:  CT abdomen and pelvis 07/23/2022     FINDINGS:     There is a partially imaged central line with the tip at the inferior  cavoatrial junction. The heart is enlarged. There is a new small  pericardial effusion. There are small bilateral pleural effusions with  mild adjacent atelectasis, right greater than left, similar to prior.  The liver is normal in size. A hypodense focus in the right hepatic lobe  is too small to accurately characterize but not significantly changed.  The gallbladder is decompressed. The spleen is normal in size but has  increased in size from 07/23/2022, measuring 11.5 cm in AP dimension.  There is a diffusely mottled appearance of the splenic parenchyma. The  pancreas is diffusely hypodense. The adrenal glands are within normal  limits. The kidneys are symmetrically atrophic. There is no  hydronephrosis.  There is diffuse mesenteric edema, similar to prior, with improved small  volume free fluid. There are prominent bilateral inguinal lymph nodes.  The abdominal aorta is normal in caliber.   There is diffuse gastric wall thickening and mucosal hyperenhancement.  The appendix is visualized and within normal limits. The bladder is  collapsed and process, although there is diffuse bladder wall  thickening. The uterus is present. There is no adnexal mass.  There is mild diffuse body wall edema, slightly improved.          Impression:         1.  Findings of anasarca, including small bilateral pleural effusions  with mild adjacent atelectasis, a small pericardial effusion, diffuse  mesenteric edema and small volume free fluid, and mild diffuse body wall  edema.  2.  Diffuse gastric wall thickening and mucosal hyperenhancement,  incompletely  assessed without oral contrast, which could be due to poor  distention but could also reflect a nonspecific gastritis or sequela of  the patient's overall fluid status.  3.  Diffusely hypodense pancreas. Correlate with serum lipase to  evaluate for possible pancreatitis.  4.  Increased spleen size with a diffuse mottled appearance of the  parenchyma, which cannot be compared to the prior noncontrast enhanced  examination and is nonspecific.   5.  Diffuse bladder wall thickening may be due to poor distention,  however, correlate with urinalysis if there is clinical concern for  acute cystitis.     This report was finalized on 2/14/2023 4:03 PM by Dr. Shea Celestin M.D.                I reviewed the patient's new clinical results / labs / tests / procedures      Assessment/Plan     Active Hospital Problems    Diagnosis  POA   • **Hypertensive urgency [I16.0]  Yes   • Pericardial effusion [I31.39]  Yes   • ESRD (end stage renal disease) ([facility]) [N18.6]  Yes   • Hemodialysis status ([facility]) [Z99.2]  Not Applicable   • Seizure disorder ([facility]) [G40.909]  Yes   • Elevated liver function tests [R79.89]  Yes   • C. difficile colitis [A04.72]  Yes   • Systemic lupus erythematosus ([facility]) [M32.9]  Yes   • Pancytopenia ([facility]) [D61.818]  Yes      Resolved Hospital Problems   No resolved problems to display.           · Hypertensive urgency in a patient with a history of hypertension and pericardial effusion.  Patient tells  me that she is taking her medications regularly.  Improving after medication resumption and increasing minoxidil.  Will continue Coreg/Adalat and minoxidil.  She did not have to start Cardene drip and I will DC.  Continue as needed hydralazine IV.  Patient last echo on 2/3/2023 revealed a normal ejection fraction, left ventricular strain, left ventricular hypertrophy, grade 1 diastolic dysfunction, left atrial enlargement, significant pericardial effusion.  Repeat 2D echo noted with no changes and persistent  pericardial effusion which could be secondary to her renal failure or lupus.  No evidence of cardiac tamponade.  Troponin is elevated but declining.  Elevated proBNP.  CT scan of the brain with atrophy and small vessel disease with no acute event.  Chest x-ray with a small atelectasis versus effusion in the left lung base.  EKG with normal sinus rhythm, left atrial enlargement, left anterior fascicular block, T wave inversion in the lateral leads with ST elevation in the anterior leads (old changes).  There is no evidence of angina or congestive heart failure.  We will consult cardiology to see if the patient will eventually be needing pericardiocentesis..  Will monitor blood pressure closely.  Elevation of the proBNP and troponin is most likely secondary to renal failure.  · End-stage renal disease.  On hemodialysis T/TH/sat.  Patient appears to be euvolemic.  Nephrology is following.  S/p dialysis yesterday.    · Abdominal pain/distention secondary to C. difficile infection.  CT scan of the abdomen and pelvis reveals anasarca, and splenomegaly.  Normal liver function test..  Normal lipase.  GI checking biochemical markers of the liver.  Continue p.o. vancomycin.  Benign GI examination except mild distention.   · SLE.  Continue Plaquenil/prednisone and CellCept.  · Seizures.  Continue Vimpat.  Negative CNS examination.  · Pancytopenia.  Low hemoglobin is stable about the baseline between 9.3 and 10.  Leukopenia is worse today with a basis line around the baseline of 2-3.  Platelets worse than the baseline of .  Elevated IPF.  CT scan of the abdomen revealed no lymphadenopathy but positive splenomegaly.  Above could be secondary to medication/splenomegaly/bone marrow suppression.  Awaiting hematology oncology feedback.     Discussed my findings and plan of treatment with the patient/nurses at multidisciplinary rounds.  Disposition.  To be determined based on clinical course.  Eventually home        Lakisha SUTTON  MD [place]  [facility]  02/15/23  08:57 EST

## 2023-02-16 LAB
ALBUMIN SERPL-MCNC: 3.5 G/DL (ref 3.5–5.2)
ANA SER QL: POSITIVE
ANION GAP SERPL CALCULATED.3IONS-SCNC: 6.8 MMOL/L (ref 5–15)
BUN SERPL-MCNC: 20 MG/DL (ref 6–20)
BUN/CREAT SERPL: 2.9 (ref 7–25)
CALCIUM SPEC-SCNC: 8.6 MG/DL (ref 8.6–10.5)
CENTROMERE B AB SER-ACNC: <0.2 AI (ref 0–0.9)
CHLORIDE SERPL-SCNC: 103 MMOL/L (ref 98–107)
CHROMATIN AB SERPL-ACNC: >8 AI (ref 0–0.9)
CO2 SERPL-SCNC: 26.2 MMOL/L (ref 22–29)
CREAT SERPL-MCNC: 6.8 MG/DL (ref 0.57–1)
DEPRECATED RDW RBC AUTO: 54.4 FL (ref 37–54)
DSDNA AB SER-ACNC: 49 IU/ML (ref 0–9)
EGFRCR SERPLBLD CKD-EPI 2021: 7.8 ML/MIN/1.73
ENA JO1 AB SER-ACNC: <0.2 AI (ref 0–0.9)
ENA RNP AB SER-ACNC: 6.7 AI (ref 0–0.9)
ENA SCL70 AB SER-ACNC: <0.2 AI (ref 0–0.9)
ENA SM AB SER-ACNC: >8 AI (ref 0–0.9)
ENA SM+RNP AB SER-ACNC: >8 AI (ref 0–0.9)
ENA SS-A AB SER-ACNC: 7.3 AI (ref 0–0.9)
ENA SS-B AB SER-ACNC: <0.2 AI (ref 0–0.9)
ERYTHROCYTE [DISTWIDTH] IN BLOOD BY AUTOMATED COUNT: 20 % (ref 12.3–15.4)
GLUCOSE SERPL-MCNC: 104 MG/DL (ref 65–99)
HCT VFR BLD AUTO: 27.5 % (ref 34–46.6)
HGB BLD-MCNC: 7.9 G/DL (ref 12–15.9)
IRON 24H UR-MRATE: 58 MCG/DL (ref 37–145)
IRON SATN MFR SERPL: 26 % (ref 20–50)
LYMPHOCYTES # BLD MANUAL: 0.41 10*3/MM3 (ref 0.7–3.1)
LYMPHOCYTES NFR BLD MANUAL: 15 % (ref 5–12)
Lab: ABNORMAL
MCH RBC QN AUTO: 23 PG (ref 26.6–33)
MCHC RBC AUTO-ENTMCNC: 28.7 G/DL (ref 31.5–35.7)
MCV RBC AUTO: 79.9 FL (ref 79–97)
MITOCHONDRIA M2 IGG SER-ACNC: <20 UNITS (ref 0–20)
MONOCYTES # BLD: 0.36 10*3/MM3 (ref 0.1–0.9)
NEUTROPHILS # BLD AUTO: 1.65 10*3/MM3 (ref 1.7–7)
NEUTROPHILS NFR BLD MANUAL: 68 % (ref 42.7–76)
NRBC BLD AUTO-RTO: 0 /100 WBC (ref 0–0.2)
PHOSPHATE SERPL-MCNC: 4.8 MG/DL (ref 2.5–4.5)
PLAT MORPH BLD: NORMAL
PLATELET # BLD AUTO: 74 10*3/MM3 (ref 140–450)
PMV BLD AUTO: ABNORMAL FL
POTASSIUM SERPL-SCNC: 5 MMOL/L (ref 3.5–5.2)
RBC # BLD AUTO: 3.44 10*6/MM3 (ref 3.77–5.28)
RBC MORPH BLD: NORMAL
RIBOSOMAL P AB SER-ACNC: <0.2 AI (ref 0–0.9)
SMA IGG SER-ACNC: 13 UNITS (ref 0–19)
SODIUM SERPL-SCNC: 136 MMOL/L (ref 136–145)
TIBC SERPL-MCNC: 221 MCG/DL (ref 298–536)
TRANSFERRIN SERPL-MCNC: 148 MG/DL (ref 200–360)
TTG IGA SER-ACNC: <2 U/ML (ref 0–3)
VARIANT LYMPHS NFR BLD MANUAL: 17 % (ref 19.6–45.3)
WBC MORPH BLD: NORMAL
WBC NRBC COR # BLD: 2.43 10*3/MM3 (ref 3.4–10.8)

## 2023-02-16 PROCEDURE — 99232 SBSQ HOSP IP/OBS MODERATE 35: CPT | Performed by: INTERNAL MEDICINE

## 2023-02-16 PROCEDURE — 85007 BL SMEAR W/DIFF WBC COUNT: CPT | Performed by: INTERNAL MEDICINE

## 2023-02-16 PROCEDURE — 80069 RENAL FUNCTION PANEL: CPT | Performed by: INTERNAL MEDICINE

## 2023-02-16 PROCEDURE — 85025 COMPLETE CBC W/AUTO DIFF WBC: CPT | Performed by: INTERNAL MEDICINE

## 2023-02-16 PROCEDURE — 25010000002 EPOETIN ALFA-EPBX 10000 UNIT/ML SOLUTION: Performed by: INTERNAL MEDICINE

## 2023-02-16 PROCEDURE — 63710000001 PREDNISONE PER 5 MG: Performed by: INTERNAL MEDICINE

## 2023-02-16 PROCEDURE — 99232 SBSQ HOSP IP/OBS MODERATE 35: CPT

## 2023-02-16 PROCEDURE — 84466 ASSAY OF TRANSFERRIN: CPT | Performed by: INTERNAL MEDICINE

## 2023-02-16 PROCEDURE — 83540 ASSAY OF IRON: CPT | Performed by: INTERNAL MEDICINE

## 2023-02-16 PROCEDURE — 25010000002 HEPARIN (PORCINE) PER 1000 UNITS: Performed by: INTERNAL MEDICINE

## 2023-02-16 PROCEDURE — 63710000001 MYCOPHENOLATE MOFETIL PER 250 MG: Performed by: INTERNAL MEDICINE

## 2023-02-16 RX ADMIN — GABAPENTIN 100 MG: 100 CAPSULE ORAL at 21:49

## 2023-02-16 RX ADMIN — CARVEDILOL 25 MG: 25 TABLET, FILM COATED ORAL at 21:43

## 2023-02-16 RX ADMIN — NIFEDIPINE 60 MG: 60 TABLET, FILM COATED, EXTENDED RELEASE ORAL at 15:42

## 2023-02-16 RX ADMIN — PREDNISONE 10 MG: 10 TABLET ORAL at 15:42

## 2023-02-16 RX ADMIN — HYDRALAZINE HYDROCHLORIDE 25 MG: 25 TABLET, FILM COATED ORAL at 21:43

## 2023-02-16 RX ADMIN — VANCOMYCIN HYDROCHLORIDE 125 MG: 125 CAPSULE ORAL at 15:42

## 2023-02-16 RX ADMIN — FAMOTIDINE 20 MG: 20 TABLET, FILM COATED ORAL at 15:42

## 2023-02-16 RX ADMIN — LACOSAMIDE 50 MG: 100 TABLET, FILM COATED ORAL at 21:49

## 2023-02-16 RX ADMIN — NIFEDIPINE 60 MG: 60 TABLET, FILM COATED, EXTENDED RELEASE ORAL at 21:43

## 2023-02-16 RX ADMIN — LACOSAMIDE 50 MG: 100 TABLET, FILM COATED ORAL at 15:43

## 2023-02-16 RX ADMIN — HEPARIN SODIUM 4000 UNITS: 1000 INJECTION INTRAVENOUS; SUBCUTANEOUS at 13:40

## 2023-02-16 RX ADMIN — CARVEDILOL 25 MG: 25 TABLET, FILM COATED ORAL at 15:42

## 2023-02-16 RX ADMIN — EPOETIN ALFA-EPBX 10000 UNITS: 10000 INJECTION, SOLUTION INTRAVENOUS; SUBCUTANEOUS at 15:42

## 2023-02-16 RX ADMIN — MYCOPHENOLATE MOFETIL 250 MG: 500 TABLET ORAL at 21:44

## 2023-02-16 RX ADMIN — GABAPENTIN 100 MG: 100 CAPSULE ORAL at 15:42

## 2023-02-16 RX ADMIN — MYCOPHENOLATE MOFETIL 250 MG: 500 TABLET ORAL at 15:42

## 2023-02-16 NOTE — PROGRESS NOTES
Subjective  pancytopenia, , SLE, CKD, LIVER TEST ABNORMALITY, SPLENOMEGALY, ANASARCA, HYPERTENSIVE URGENCY        History of Present Illness  On 02/15/2023 this 32-year-old  female who has been seen by my partner, Tiesha Ocasio MD, in the past has been admitted to the hospital with a hypertensive emergency and we have been consulted in regard to pancytopenia. The patient is known for having systemic lupus erythematosus after a diagnosis was made through a kidney biopsy and serological analysis. She has been in therapy for this. Unfortunately her kidney function has been terrible, the patient is now undergoing dialysis and she continues undergoing therapy for her lupus. The patient has been admitted to the hospital because she had a blood pressure systolic of 230 in outside facility clinic and obviously for this reason was admitted for proper control. The Nephrology team already is taking care of this.      At the time of the visit the patient was eating fried chicken by the bucket and she was not having any symptoms at all. She was not having any pain, shortness of breath, cough, sputum production, nausea, vomiting, abdominal distention or passage of blood in the stool. She was not having any rashes in the skin and she wanted to go home.      She has not had any recent fever or infection. Her dialysis catheter has remained functional.    On 02/16/2023 the patient is undergoing dialysis. She does not want to wait any longer to go home. She is eating well even though she has some element of abdominal pain today. She has not had any nausea or vomiting, good bowel activity. No cough or shortness of breath. No bleeding, no fever.         Past Medical History, Past Surgical History, Social History, Family History have been reviewed and are without significant changes except as mentioned.    Review of Systems   Constitutional: Positive for fatigue.   HENT: Negative.    Respiratory: Negative.     Cardiovascular: Negative.    Gastrointestinal: Positive for abdominal pain.   Genitourinary: Negative.    Musculoskeletal: Negative.    Skin: Negative.    Neurological: Negative.    Hematological: Negative.    Psychiatric/Behavioral: Negative.         Medications:  The current medication list was reviewed in the EMR    ALLERGIES:    Allergies   Allergen Reactions   • Minoxidil Other (See Comments)     Pericardial effusion .       Objective      Vitals:    02/15/23 2037 02/15/23 2333 02/16/23 0500 02/16/23 0725   BP: 132/95 136/97  122/84   BP Location: Right arm Right arm  Right arm   Patient Position: Lying Lying  Lying   Pulse: 89 102  93   Resp:  18  17   Temp:  98.9 °F (37.2 °C)  98.7 °F (37.1 °C)   TempSrc:  Oral  Oral   SpO2: 99% 99%     Weight:   52.3 kg (115 lb 4.8 oz)  Comment: STANDING SCALE    Height:         Current Status 11/2/2022   ECOG score 0       Physical Exam  Constitutional:       Appearance: She is ill-appearing.   HENT:      Head: Normocephalic.   Eyes:      General: No scleral icterus.  Cardiovascular:      Rate and Rhythm: Normal rate and regular rhythm.      Pulses: Normal pulses.      Heart sounds: Normal heart sounds. No murmur heard.    No gallop.   Pulmonary:      Effort: Pulmonary effort is normal. No respiratory distress.      Breath sounds: Normal breath sounds. No wheezing.   Abdominal:      General: There is distension.      Palpations: There is no mass.      Tenderness: There is abdominal tenderness. There is no guarding or rebound.      Hernia: No hernia is present.   Musculoskeletal:      Right lower leg: Edema present.      Left lower leg: Edema present.   Skin:     General: Skin is warm and dry.      Coloration: Skin is pale.   Neurological:      General: No focal deficit present.      Mental Status: She is alert.           RECENT LABS:  Hematology WBC   Date Value Ref Range Status   02/16/2023 2.43 (L) 3.40 - 10.80 10*3/mm3 Final     RBC   Date Value Ref Range Status    02/16/2023 3.44 (L) 3.77 - 5.28 10*6/mm3 Final     Hemoglobin   Date Value Ref Range Status   02/16/2023 7.9 (L) 12.0 - 15.9 g/dL Final     Hematocrit   Date Value Ref Range Status   02/16/2023 27.5 (L) 34.0 - 46.6 % Final     Platelets   Date Value Ref Range Status   02/16/2023 74 (L) 140 - 450 10*3/mm3 Final      Retic With IRF & RET-He  Order: [id number]   Status: Final result      Visible to patient: Yes (not seen)      Next appt: 04/05/2023 at 03:15 PM in Gastroenterology (Drew Kaminski MD)     Specimen Information: Blood    0 Result Notes          Component  Ref Range & Units 1 d ago  (2/15/23) 6 mo ago  (7/30/22) 6 mo ago  (7/24/22) 6 mo ago  (7/23/22) 7 mo ago  (7/21/22)   Immature Reticulocyte Fraction  3.0 - 15.8 % 10.9        Reticulocyte %  0.70 - 1.90 % 2.10 High   2.88 High   1.97 High   1.66  1.63    Reticulocyte Absolute  0.0200 - 0.1300 10*6/mm3 0.0779  0.1008  0.0636  0.0651  0.0678    Reticulocyte Hgb  29.8 - 36.1 pg 25.9 Low         Resulting Agency  [facility]              Specimen Collected: 02/15/23 15:43 EST Last Resulted: 02/15/23 16:41 EST           IgG, IgA, IgM  Order: [id number]   Status: Final result      Visible to patient: Yes (not seen)      Next appt: 04/05/2023 at 03:15 PM in Gastroenterology (Drew Kaminski MD)     Specimen Information: Blood    0 Result Notes        Component  Ref Range & Units 1 d ago 6 mo ago 7 mo ago   IgG  700 - 1,600 mg/dL 1,255  942 R  2375 High  R    IgM  40 - 230 mg/dL 79  69 R  139 R    IgA  70 - 400 mg/dL 174  170 R  227 R    Resulting Agency  [facility]              Specimen Collected: 02/15/23 03:43 EST Last Resulted: 02/15/23 04:39 EST           Contains critical data Immature Platelet Fraction  Order: [id number]   Status: Final result      Visible to patient: Yes (not seen)      Next appt: 04/05/2023 at 03:15 PM in Gastroenterology (Drew Kaminski MD)     Specimen Information: Blood    0 Result  Notes            Component  Ref Range & Units 2 d ago  (2/14/23) 2 d ago  (2/14/23) 3 d ago  (2/13/23) 2 mo ago  (12/9/22) 3 mo ago  (11/16/22) 3 mo ago  (11/2/22) 4 mo ago  (10/19/22)   IPF  0.90 - 6.50 % 24.90 High           Platelets  140 - 450 10*3/mm3 31 Low Critical   27 Low Critical   26 Low Critical   59 Low  R, CM  99 Low   119 Low   136 Low     Resulting Agency  [facility]  [facility]              Specimen Collected: 02/14/23 12:53 EST Last Resulted: 02/14/23 13:21 EST               Assessment & Plan      On 02/15/2023 this 32-year-old female has been admitted to the hospital with hypertensive crisis being treated by Nephrology and successfully coming down in regard to blood pressure. We have been consulted because the patient has pancytopenia with low white count, hemoglobin and platelets. These numbers are a little bit worse than the number that she had before when she had again pancytopenia and she was worked up by partner, Dr. Tiesha Ocasio, at the time that her lupus diagnosis was made last year in 2022.      It is very obviously radiologically speaking that the patient has large volume expansion raising blood pressure and also producing ascites, liver enlargement, splenomegaly and very likely sequestration of white cells and platelets by her enlarged spleen. It is impossible to separate how much of the pancytopenia is also related to lupus per se because this condition also typically can produce autoimmune thrombocytopenia and autoimmune neutropenia. I am sure that the patient has been receiving erythropoietin and IV iron by the nephrology team. I think it is worth it to obtain an immature platelet fraction and reticulated hemoglobin and  things in that way.      Besides all these things obviously the patient needs to have frequent dialysis per Nephrology team to remove excessive amount of water that she has. The patient is eating at  the time of the visit by the buckets, fried chicken. Raises the question if she grabs the idea of sodium restriction in her diet and things of this nature. To me the idea is that she does not have it. In any event, this will be addressed I am sure by the Nephrology team.      Therefore I went ahead and ordered an immature platelet fraction, obtained also an LDH and also obtained a reticulated hemoglobin. Right now I do not believe that the patient needs to have any blood products of any nature, neither do I need to pursue bone marrow testing.      We will review hematological parameters including CBC differential on daily basis. I do not expect these numbers to change too much over time.     I discussed all these facts with the patient's mother and grandmother present in the room.   On 02/16/2023 the patient has stated to me that she will leave the hospital today no matter what after dialysis is completed. From my point of view her white count is 2700 a little bit better, hemoglobin is stable and her platelet count actually with mild improvement.     Her immature platelet fraction is elevated telling me that the patient is producing platelets, the bone marrow is active doing its job and that is good news. Obviously it is impossible for me to separate her pancytopenia from sequestration by the spleen being so enlarged and by also the effect of lupus on her. Therefore obviously the difficulty is what to do. At least she has no need for blood products today, she is not bleeding, she does not look like she is infected and I have no other suggestions.    The reticulated hemoglobin is low, it is very likely that this patient has a component of alpha thalassemia. As I posted yesterday I am sure that the primary team Nephrology is giving her erythropoietin and IV iron therapy in the normal routine of her healthcare. That is why I will not suggest to proceed with any IV iron therapy at this time.    There is not too much  else that I can to this patient's care at this point.     Available if needed for any other issues or concerns.     ·   DURING THE VISIT WITH THE PATIENT TODAY , PATIENT HAD FACE MASK, I HAD PROPER PROTECTIVE EQUIPMENT, AND I DID HAND HYGIENE WITH SOAP AND WATER BEFORE AND AFTER THE VISIT.                     2/16/2023      CC:

## 2023-02-16 NOTE — PLAN OF CARE
Problem: Malnutrition  Goal: Improved Nutritional Intake  Outcome: Ongoing, Progressing  Intervention: Promote and Optimize Oral Intake  Flowsheets (Taken 2/16/2023 1533)  Oral Nutrition Promotion:   calorie-dense foods provided   nutritional therapy counseling provided   calorie-dense liquids provided   Goal Outcome Evaluation: Nutrition assessment complete, MSA complete. Supplements added, RD to follow.

## 2023-02-16 NOTE — PROGRESS NOTES
Nutrition Services    Patient Name:  Dee Herrera  YOB: 1990  MRN: [id number]  Admit Date:  2/13/2023    Assessment Date:  02/16/23    Comment: Nutrition assessment initiated due to MST score of 3. Pt here with HTN, pericardial effusion, ESRD- HD, Abd pain, CDiff, possible pancreatitis, Seizures, pancytopenia.  She has been struggling with some abd pain but was able to eat 100% of her meals yesterday.  Nothing yet today but asking for her lunch tray.  She confirms her weight loss and contributes it to GI issues and decline in intake.     Nutrition focused physical exam performed, she has noted muscle wasting and fat loss. Along with these findings, weight loss of 11% x 4 months, 25% x 6-7 months, poor po, She meets criteria for severe chronic malnutrition. See MSA below.      Pt agreeable to nutrition supplements. Will add novasource renal with meals, encouraged po. Will follow clinical course, nutrition needs.       CLINICAL NUTRITION ASSESSMENT      Reason for Assessment MST score 2+     Diagnosis/Problem   HTN, pericardial effusion, ESRD- HD, Abd pain, CDiff, possible pancreatitis, Seizures, pancytopenia    Medical/Surgical History Past Medical History:   Diagnosis Date   • History of anemia    • History of transfusion    • Hypertension    • Iron deficiency anemia 09/27/2021   • Lupus (systemic lupus erythematosus) (HCC) 07/30/2022   • Other specified nutritional anemias    • Seizures ([facility])    • Vitamin D deficiency 09/27/2021       Past Surgical History:   Procedure Laterality Date   • INSERTION HEMODIALYSIS CATHETER N/A 07/26/2022    Procedure: RIGHT TUNNELED DIALYSIS CATHETER PLACEMENT;  Surgeon: Diandra Adhikari MD;  Location: [facility];  Service: Vascular;  Laterality: N/A;   • NO PAST SURGERIES     • TONSILLECTOMY          Encounter Information        Nutrition History:     Food Preferences:    Supplements:    Factors Affecting Intake: abdominal pain, altered GI function, decreased  "appetite     Anthropometrics        Current Height  Current Weight  BMI kg/m2 Height: 165.1 cm (65\")  Weight: 52.3 kg (115 lb 4.8 oz) (STANDING SCALE) (02/16/23 0500)  Body mass index is 19.19 kg/m².   Adjusted BMI (if applicable)        Admission Weight 54.5kg       Ideal Body Weight (IBW) 57kg   Adjusted IBW (if applicable)        Usual Body Weight (UBW) 155lb   Weight Change/Trend Loss 11% x 4 months, 25% x 6-7 months       Weight History Wt Readings from Last 30 Encounters:   02/16/23 0500 52.3 kg (115 lb 4.8 oz)   02/15/23 0652 54.7 kg (120 lb 9.5 oz)   02/14/23 1700 54.4 kg (120 lb)   02/14/23 0445 54.8 kg (120 lb 13 oz)   02/13/23 1058 54.4 kg (120 lb)   02/13/23 0936 54.5 kg (120 lb 3.2 oz)   02/03/23 1134 53.1 kg (117 lb)   02/03/23 1247 52.5 kg (115 lb 12.8 oz)   02/01/23 1709 53.2 kg (117 lb 3.2 oz)   01/11/23 1146 53.3 kg (117 lb 6.4 oz)   12/08/22 1547 62.5 kg (137 lb 12.8 oz)   11/16/22 0912 59.1 kg (130 lb 3.2 oz)   11/14/22 1358 61.1 kg (134 lb 12.8 oz)   11/02/22 0915 58.9 kg (129 lb 12.8 oz)   10/19/22 0821 60 kg (132 lb 3.2 oz)   10/17/22 0951 60.2 kg (132 lb 12.8 oz)   09/13/22 1437 67.7 kg (149 lb 3.2 oz)   08/27/22 1256 70.3 kg (155 lb)   08/03/22 1501 70.7 kg (155 lb 12.8 oz)   07/30/22 0556 70 kg (154 lb 4.8 oz)   07/28/22 0631 67.7 kg (149 lb 3.2 oz)   07/27/22 0453 75.2 kg (165 lb 12.6 oz)   07/26/22 0629 69.8 kg (153 lb 14.4 oz)   07/25/22 0533 70 kg (154 lb 4.8 oz)   07/24/22 0513 70.4 kg (155 lb 4.8 oz)   07/23/22 0316 69.6 kg (153 lb 6.4 oz)   07/22/22 0616 68 kg (149 lb 14.4 oz)   07/21/22 1653 66.7 kg (147 lb)   07/20/22 1954 66.7 kg (147 lb)   07/20/22 1420 66.7 kg (147 lb)   07/20/22 0856 66.9 kg (147 lb 6.4 oz)   07/11/22 0903 64.6 kg (142 lb 6.4 oz)   04/21/22 1112 63.6 kg (140 lb 3.2 oz)   02/02/22 0825 63.9 kg (140 lb 12.8 oz)   01/24/22 0752 63.8 kg (140 lb 9.6 oz)   06/25/21 1009 70.3 kg (155 lb)             Estimated/Assessed Needs       Energy Requirements    Height for " "Calculation  Height: 165.1 cm (65\")   Weight for Calculation 52.3kg   Method for Estimation  30 kcal/kg   EST Needs (kcal/day) 4797-9547       Protein Requirements    Weight for Calculation 52.3kg   EST Protein Needs (g/kg) 1.2 gm/kg   EST Daily Needs (g/day) 63       Fluid Requirements     Method for Estimation Defer to physician    Estimated Needs (mL/day)        Fluid Deficit    Current Na Level (mEq/L)    Desired Na Level (mEq/L)    Estimated Fluid Deficit (L)       Tests/Procedures        Tests/Procedures CT scan, Dialysis, X-Ray     Labs       Pertinent Labs    Results from last 7 days   Lab Units 02/16/23  0356 02/15/23  0343 02/13/23  2208 02/13/23  1148   SODIUM mmol/L 136 135* 134* 135*   POTASSIUM mmol/L 5.0 3.9 4.6 4.7   CHLORIDE mmol/L 103 100 96* 96*   CO2 mmol/L 26.2 27.1 30.0* 30.0*   BUN mg/dL 20 8 17 14   CREATININE mg/dL 6.80* 4.63* 7.53* 6.91*   CALCIUM mg/dL 8.6 8.7 8.7 9.1   BILIRUBIN mg/dL  --  0.6  --  1.2   ALK PHOS U/L  --  40  --  41   ALT (SGPT) U/L  --  8  --  9   AST (SGOT) U/L  --  23  --  42*   GLUCOSE mg/dL 104* 114* 108* 89     Results from last 7 days   Lab Units 02/16/23  0356   PHOSPHORUS mg/dL 4.8*   HEMOGLOBIN g/dL 7.9*   HEMATOCRIT % 27.5*   WBC 10*3/mm3 2.43*   ALBUMIN g/dL 3.5     Results from last 7 days   Lab Units 02/16/23  0356 02/15/23  1543 02/15/23  0343 02/14/23  1253 02/14/23  0348   PLATELETS 10*3/mm3 74* 59* 52* 31* 27*     SARS-CoV-2, CHLOÉ   Date Value Ref Range Status   11/21/2022 NEGATIVE Negative Final     Comment:     The 2019-CoV rRT-PCR Assay is only for use under a Food and Drug Administration Emergency Use Authorization. The performance characteristics of the assay were verified by the Clinical Laboratory at [facility]. Results should be used in   conjunction with the patient's clinical symptoms, medical history, and other clinical/laboratory findings to determine an overall clinical diagnosis. Negative results do not preclude infection with " SARS-CoV-2 (COVID-19).    Test parameters have not been validated for screening asymptomatic patients.     Lab Results   Component Value Date    HGBA1C 3.9 (L) 01/26/2023          Medications           Scheduled Medications carvedilol, 25 mg, Oral, Q12H  epoetin cari/cari-epbx, 10,000 Units, Subcutaneous, Once per day on Mon Wed Fri  famotidine, 20 mg, Oral, Daily  gabapentin, 100 mg, Oral, Q12H  hydrALAZINE, 25 mg, Oral, Q8H  lacosamide, 50 mg, Oral, Q12H  mycophenolate, 250 mg, Oral, Q12H  NIFEdipine XL, 60 mg, Oral, BID  predniSONE, 10 mg, Oral, Daily  vancomycin, 125 mg, Oral, Q6H       Infusions Pharmacy Consult,        PRN Medications •  acetaminophen  •  heparin (porcine)  •  hydrALAZINE  •  melatonin  •  ondansetron **OR** ondansetron  •  Pharmacy Consult  •  simethicone  •  [COMPLETED] Insert Peripheral IV **AND** sodium chloride     Physical Findings          Physical Appearance alert, frail, loss of muscle mass, loss of subcutaneous fat, oriented   Oral/Mouth Cavity WNL   Edema  no edema   Gastrointestinal abdominal distension, last bowel movement: 2/14   Skin  skin intact   Tubes/Drains none   NFPE Date Completed:  2/16 Cdavis   --  Current Nutrition Orders & Evaluation of Intake       Oral Nutrition     Food Allergies NKFA   Current PO Diet Diet: Cardiac Diets; Healthy Heart (2-3 Na+); Texture: Regular Texture (IDDSI 7); Fluid Consistency: Thin (IDDSI 0)   Supplement n/a   PO Evaluation     % PO Intake % x 3 meals    # of Days Evaluated 3    --  PES STATEMENT / NUTRITION DIAGNOSIS      Nutrition Dx Problem  Problem: Malnutrition  Etiology: Medical Diagnosis Adb pain, Cdiff, diarrhea  Signs/Symptoms: Report of Minimal PO Intake, NFPE Results and Unintended Weight Change    Comment:    --  NUTRITION INTERVENTION / PLAN OF CARE      Intervention Goal(s) Maintain nutrition status, Improved nutrition related labs, Reduce/improve symptoms, Meet estimated needs, Disease management/therapy, Tolerate PO ,  Increase intake and Appropriate weight gain         RD Intervention/Action Advise alternative selection, Advise available snack, Interview for preferences, Encourage intake, Follow Tx Progress, Care plan reviewed and Recommend/ordered:          Prescription/Orders:       PO Diet       Supplements novasource renal with meals      Snacks       Enteral Nutrition       Parenteral Nutrition    New Prescription Ordered? Yes   --      Monitor/Evaluation Per protocol, I&O, PO intake, Supplement intake, Pertinent labs, Weight, Skin status, GI status, Symptoms, POC/GOC, Swallow function, Hemodynamic stability   Discharge Plan/Needs Pending clinical course   Education Will instruct as appropriate   --    RD to follow per protocol.    Electronically signed by:  Zeina Aguilera RD  02/16/23 15:00 EST

## 2023-02-16 NOTE — PROGRESS NOTES
Gastroenterology   Inpatient Progress Note    Reason for Follow Up:  Abdominal pain    Subjective  Interval History:     Patient receiving hemodialysis at bedside.  Denies abdominal pain, nausea, vomiting.  Reports no bowel movement overnight.      Current Facility-Administered Medications:   •  acetaminophen (TYLENOL) tablet 650 mg, 650 mg, Oral, Q4H PRN, Laurence Cedeno MD, 650 mg at 02/13/23 2138  •  carvedilol (COREG) tablet 25 mg, 25 mg, Oral, Q12H, Eduardo Hendricks MD, 25 mg at 02/15/23 2050  •  epoetin cari-epbx (RETACRIT) injection 10,000 Units, 10,000 Units, Subcutaneous, Once per day on Mon Wed Fri, Hair Mckeon MD  •  famotidine (PEPCID) tablet 20 mg, 20 mg, Oral, Daily, Lakisha Hunt MD, 20 mg at 02/15/23 0843  •  gabapentin (NEURONTIN) capsule 100 mg, 100 mg, Oral, Q12H, Lakisha Hunt MD, 100 mg at 02/15/23 2050  •  heparin (porcine) injection 4,000 Units, 4,000 Units, Intracatheter, PRN, Eduardo Hendricks MD, 4,000 Units at 02/14/23 2355  •  hydrALAZINE (APRESOLINE) injection 10 mg, 10 mg, Intravenous, Q6H PRN, Lakisha Hunt MD, 10 mg at 02/15/23 0236  •  hydrALAZINE (APRESOLINE) tablet 25 mg, 25 mg, Oral, Q8H, Regi Hyde MD, 25 mg at 02/15/23 2156  •  lacosamide (VIMPAT) tablet 50 mg, 50 mg, Oral, Q12H, Laurence Cedeno MD, 50 mg at 02/15/23 2050  •  melatonin tablet 3 mg, 3 mg, Oral, Nightly PRN, Laurence Cedeno MD, 3 mg at 02/13/23 2138  •  mycophenolate (CELLCEPT) tablet 250 mg, 250 mg, Oral, Q12H, Regi yHde MD, 250 mg at 02/15/23 2050  •  NIFEdipine XL (PROCARDIA XL) 24 hr tablet 60 mg, 60 mg, Oral, BID, Laurence Cedeno MD, 60 mg at 02/15/23 2050  •  ondansetron (ZOFRAN) tablet 4 mg, 4 mg, Oral, Q6H PRN **OR** ondansetron (ZOFRAN) injection 4 mg, 4 mg, Intravenous, Q6H PRN, Laurence Cedeno MD, 4 mg at 02/15/23 2156  •  Pharmacy Consult, , Does not apply, Continuous PRN, Lakisha Hutn MD  •  predniSONE  (DELTASONE) tablet 10 mg, 10 mg, Oral, Daily, Hair Mckeon MD, 10 mg at 02/15/23 0843  •  simethicone (MYLICON) chewable tablet 80 mg, 80 mg, Oral, 4x Daily PRN, Bisi Winn PA  •  [COMPLETED] Insert Peripheral IV, , , Once **AND** sodium chloride 0.9 % flush 10 mL, 10 mL, Intravenous, PRN, Anthony Hawthorne MD, 10 mL at 02/15/23 0841  •  vancomycin (VANCOCIN) capsule 125 mg, 125 mg, Oral, Q6H, Lakisha Hunt MD, 125 mg at 02/15/23 5717  Review of Systems:               The following systems were reviewed and negative;  gastrointestinal    Objective     Vital Signs  Temp:  [98.7 °F (37.1 °C)-99.4 °F (37.4 °C)] 98.7 °F (37.1 °C)  Heart Rate:  [] 93  Resp:  [17-18] 17  BP: (122-151)/() 122/84  Body mass index is 19.19 kg/m².                  General Appearance:  awake, alert, oriented, in no acute distress  Physical exam unable to perform secondary to completion of hemodialysis and patient on side.                Results Review:                I reviewed the patient's new clinical results.    Results from last 7 days   Lab Units 02/16/23  0356 02/15/23  1543 02/15/23  0343 02/14/23  1253 02/14/23  0348   WBC 10*3/mm3 2.43*  --  1.92*  --  2.51*   HEMOGLOBIN g/dL 7.9*  --  9.4*  --  9.0*   HEMATOCRIT % 27.5*  --  31.1*  --  29.5*   PLATELETS 10*3/mm3 74* 59* 52*   < > 27*    < > = values in this interval not displayed.     Results from last 7 days   Lab Units 02/16/23  0356 02/15/23  0343 02/13/23  2208 02/13/23  1148   SODIUM mmol/L 136 135* 134* 135*   POTASSIUM mmol/L 5.0 3.9 4.6 4.7   CHLORIDE mmol/L 103 100 96* 96*   CO2 mmol/L 26.2 27.1 30.0* 30.0*   BUN mg/dL 20 8 17 14   CREATININE mg/dL 6.80* 4.63* 7.53* 6.91*   CALCIUM mg/dL 8.6 8.7 8.7 9.1   BILIRUBIN mg/dL  --  0.6  --  1.2   ALK PHOS U/L  --  40  --  41   ALT (SGPT) U/L  --  8  --  9   AST (SGOT) U/L  --  23  --  42*   GLUCOSE mg/dL 104* 114* 108* 89         Lab Results   Lab Value Date/Time    LIPASE 22 02/15/2023 0343     LIPASE 22 02/14/2023 2355    LIPASE 47 11/25/2022 2109    LIPASE 27 11/22/2022 1059    LIPASE 32 11/21/2022 2030       Radiology:  CT Abdomen Pelvis With Contrast   Final Result       1.  Findings of anasarca, including small bilateral pleural effusions   with mild adjacent atelectasis, a small pericardial effusion, diffuse   mesenteric edema and small volume free fluid, and mild diffuse body wall   edema.   2.  Diffuse gastric wall thickening and mucosal hyperenhancement,   incompletely assessed without oral contrast, which could be due to poor   distention but could also reflect a nonspecific gastritis or sequela of   the patient's overall fluid status.   3.  Diffusely hypodense pancreas. Correlate with serum lipase to   evaluate for possible pancreatitis.   4.  Increased spleen size with a diffuse mottled appearance of the   parenchyma, which cannot be compared to the prior noncontrast enhanced   examination and is nonspecific.    5.  Diffuse bladder wall thickening may be due to poor distention,   however, correlate with urinalysis if there is clinical concern for   acute cystitis.       This report was finalized on 2/14/2023 4:03 PM by Dr. Shea Celestin M.D.          CT Head Without Contrast   Final Result   1. Atrophy and minimal changes of small vessel ischemic disease. These   findings are somewhat advanced for a patient of this age.   2. No acute intracranial process.   3. Mild ethmoid sinusitis.   4. Findings were discussed with Dr. Hawthorne.           Radiation dose reduction techniques were utilized, including automated   exposure control and exposure modulation based on body size.       This report was finalized on 2/13/2023 5:24 PM by Dr. Clyde Rayo M.D.          XR Chest 1 View   Final Result   Small left basilar atelectasis/infiltrate/effusion,   follow-up suggested. Cardiomegaly.       This report was finalized on 2/13/2023 12:18 PM by Dr. Norm Arshad M.D.              Assessment &  Plan     Patient Active Problem List   Diagnosis   • Vitamin D deficiency   • Iron deficiency anemia   • Morning stiffness of joints   • Iron deficiency anemia, unspecified iron deficiency anemia type   • Thrombocytopenia (HCC)   • Acute renal failure (ARF) (HCC)   • Hypertension secondary to other renal disorders   • Pancytopenia (HCC)   • Hypoalbuminemia   • Elevated troponin   • Volume overload   • Ear drainage right   • Hypertensive urgency   • T.T.P. syndrome (HCC)   • Systemic lupus erythematosus (HCC)   • Lupus nephritis, ISN/RPS class IV (HCC)   • Hypokalemia   • Hypocalcemia   • COVID-19   • Hospital discharge follow-up   • Stage 5 chronic kidney disease (HCC)   • Pericardial effusion   • Cardiac cirrhosis   • Pancreatitis   • Duodenitis   • Regional enteritis of small bowel (HCC)   • Amyloid disease (HCC)   • Hypertensive urgency   • Pericardial effusion   • ESRD (end stage renal disease) (HCC)   • Hemodialysis status (HCC)   • Seizure disorder (HCC)   • Elevated liver function tests   • C. difficile colitis       Assessment:  1. Chronic abdominal pain, diffuse  2. Hypertension  3. End-stage renal disease, on hemodialysis-follows with  for possible transplant  4. SLE  5. Diarrhea, resolved C. difficile toxin PCR positive, antigen negative suggest colonization  6. An eschar  7. Abnormal imaging the pancreas suggestive of pancreatitis-however clinical symptoms not consistent with acute pancreatitis      Plan:    Continue simethicone  Hemodialysis scheduled for today      I discussed the patients findings and my recommendations with patient and nursing staff, Dr. Pittman.          CAESAR Huff  [facility], [place], operated by [facility]  [address] [place] [address]

## 2023-02-16 NOTE — PLAN OF CARE
Goal Outcome Evaluation:      Dialysis completed today. Central line dressing change done per dialysis rn. Retacrit give sub q. No complaints. Safety maintained. Roberta Landaverde

## 2023-02-16 NOTE — PROGRESS NOTES
[facility] Follow Up    Chief Complaint: Follow up pericardial effusion, LVH    Interval History: She is asleep this morning but will wake up enough to tell me that she is not having any chest pain or shortness of breath.    Objective:     Objective:  Temp:  [98.7 °F (37.1 °C)-99.4 °F (37.4 °C)] 98.7 °F (37.1 °C)  Heart Rate:  [] 93  Resp:  [17-18] 17  BP: (122-151)/() 122/84     Intake/Output Summary (Last 24 hours) at 2/16/2023 0745  Last data filed at 2/16/2023 0308  Gross per 24 hour   Intake 1260 ml   Output --   Net 1260 ml     Body mass index is 19.19 kg/m².      02/14/23  1700 02/15/23  0652 02/16/23  0500   Weight: 54.4 kg (120 lb) 54.7 kg (120 lb 9.5 oz) 52.3 kg (115 lb 4.8 oz) (STANDING SCALE)     Weight change: -2.132 kg (-4 lb 11.2 oz)      Physical Exam:   General : Alert, cooperative, in no acute distress.  Neuro: Alert,cooperative and oriented.  Lungs: CTAB. Normal respiratory effort and rate.  CV: Regular rate and rhythm, normal S1 and S2, no murmurs, gallops or rubs.  ABD: Soft, nontender, nondistended. Positive bowel sounds.  Extr: No edema or cyanosis, moves all extremities.    Lab Review:   Results from last 7 days   Lab Units 02/16/23  0356 02/15/23  0343 02/13/23  2208 02/13/23  1148   SODIUM mmol/L 136 135*   < > 135*   POTASSIUM mmol/L 5.0 3.9   < > 4.7   CHLORIDE mmol/L 103 100   < > 96*   CO2 mmol/L 26.2 27.1   < > 30.0*   BUN mg/dL 20 8   < > 14   CREATININE mg/dL 6.80* 4.63*   < > 6.91*   GLUCOSE mg/dL 104* 114*   < > 89   CALCIUM mg/dL 8.6 8.7   < > 9.1   AST (SGOT) U/L  --  23  --  42*   ALT (SGPT) U/L  --  8  --  9    < > = values in this interval not displayed.     Results from last 7 days   Lab Units 02/14/23  2355 02/13/23  2208 02/13/23  1148   HSTROP T ng/L 58* 68* 66*     Results from last 7 days   Lab Units 02/16/23  0356 02/15/23  1543 02/15/23  0343   WBC 10*3/mm3 2.43*  --  1.92*   HEMOGLOBIN g/dL 7.9*  --  9.4*   HEMATOCRIT % 27.5*  --   31.1*   PLATELETS 10*3/mm3 74* 59* 52*                   Invalid input(s): LDLCALC  Results from last 7 days   Lab Units 02/13/23  1148   PROBNP pg/mL >70,000.0*         I reviewed the patient's new clinical results.  I personally viewed and interpreted the patient's EKG  Current Medications:   Scheduled Meds:carvedilol, 25 mg, Oral, Q12H  epoetin cari/cari-epbx, 10,000 Units, Subcutaneous, Once per day on Mon Wed Fri  famotidine, 20 mg, Oral, Daily  gabapentin, 100 mg, Oral, Q12H  hydrALAZINE, 25 mg, Oral, Q8H  lacosamide, 50 mg, Oral, Q12H  mycophenolate, 250 mg, Oral, Q12H  NIFEdipine XL, 60 mg, Oral, BID  predniSONE, 10 mg, Oral, Daily  vancomycin, 125 mg, Oral, Q6H      Continuous Infusions:Pharmacy Consult,         Allergies:  Allergies   Allergen Reactions   • Minoxidil Other (See Comments)     Pericardial effusion .       Assessment/Plan:     1.  Pericardial effusion.    Small to moderate effusion that appears stable in appearance compared to her prior echocardiogram on 2/3.  Effusion appears small anteriorly and loculated and moderate in size posteriorly.  No evidence of tamponade by echo or clinically.  2.  Hypertension.  Elevated pressures on admission due to recent noncompliance to medications.    Improved overall on current regimen.    3.  ESRD.  On hemodialysis.  Nephrology is following.  4.  C. difficile infection.  On oral vancomycin.  5.  Chronic abdominal pain.  6.  Anasarca.  Noted on recent CT scan due to pleural and pericardial effusions, mesenteric edema, and mild diffuse body wall edema.  As above her echocardiogram shows normal left ventricular systolic function but she certainly does have thickened left ventricle which could put her at risk for restrictive physiology.  7.  Systemic lupus erythematosus.  8.  Left ventricular hypertrophy.  Could certainly be due to longstanding hypertension and renal disease.  However appearance is also concerning for amyloid.  She was to have a PYP scan today  as an outpatient but this is now on hold.    -Her pericardial effusion is small to moderate and stable in appearance.  There is no indication for pericardiocentesis at this time.  -Agree with nephrology's plan to discontinue minoxidil since this may be contributing or responsible for her pericardial effusion.    -We will pursue further work-up of her left ventricular hypertrophy as an outpatient.    Juana Taylor MD  02/16/23  07:45 EST

## 2023-02-16 NOTE — PROGRESS NOTES
Name: Dee Herrera ADMIT: 2023   : 1990  PCP: Bobby Corey MD    MRN: [id number] LOS: 3 days   AGE/SEX: 32 y.o. female  ROOM: [facility]     Subjective   Subjective   Patient seen and examined this morning. Hospital day 3. At time of my examination, patient is awake, resting comfortably in bed, getting HD at bedside. Tolerating well per dialysis nurse. Patient in no acute distress.    Review of Systems   Constitutional: Negative for chills and fever.   Respiratory: Negative for shortness of breath.    Cardiovascular: Negative for chest pain.   Gastrointestinal: Negative for abdominal pain.        Objective   Objective   Vital Signs  Temp:  [98.7 °F (37.1 °C)-99.4 °F (37.4 °C)] 98.7 °F (37.1 °C)  Heart Rate:  [] 93  Resp:  [17-18] 17  BP: (122-151)/() 122/84  SpO2:  [84 %-100 %] 99 %  on   ;   Device (Oxygen Therapy): room air  Body mass index is 19.19 kg/m².  Physical Exam  Vitals and nursing note reviewed.   Constitutional:       General: She is not in acute distress.     Appearance: She is ill-appearing.   Cardiovascular:      Rate and Rhythm: Normal rate and regular rhythm.      Pulses: Normal pulses.      Heart sounds: Normal heart sounds.   Pulmonary:      Effort: Pulmonary effort is normal. No respiratory distress.      Breath sounds: Normal breath sounds.   Abdominal:      General: Bowel sounds are normal. There is no distension.      Palpations: Abdomen is soft.      Tenderness: There is no abdominal tenderness.   Musculoskeletal:      Right lower leg: No edema.      Left lower leg: No edema.   Skin:     General: Skin is warm and dry.   Neurological:      Mental Status: She is alert.       Results Review     I reviewed the patient's new clinical results.  Results from last 7 days   Lab Units 23  0356 02/15/23  1543 02/15/23  0343 23  1253 23  0348 23  1148   WBC 10*3/mm3 2.43*  --  1.92*  --  2.51* 2.46*   HEMOGLOBIN g/dL 7.9*  --  9.4*  --  9.0* 10.4*    PLATELETS 10*3/mm3 74* 59* 52* 31* 27* 26*     Results from last 7 days   Lab Units 02/16/23  0356 02/15/23  0343 02/13/23  2208 02/13/23  1148   SODIUM mmol/L 136 135* 134* 135*   POTASSIUM mmol/L 5.0 3.9 4.6 4.7   CHLORIDE mmol/L 103 100 96* 96*   CO2 mmol/L 26.2 27.1 30.0* 30.0*   BUN mg/dL 20 8 17 14   CREATININE mg/dL 6.80* 4.63* 7.53* 6.91*   GLUCOSE mg/dL 104* 114* 108* 89   EGFR mL/min/1.73 7.8* 12.4* 6.9* 7.7*     Results from last 7 days   Lab Units 02/16/23  0356 02/15/23  0343 02/13/23  1148   ALBUMIN g/dL 3.5 3.6 3.7   BILIRUBIN mg/dL  --  0.6 1.2   ALK PHOS U/L  --  40 41   AST (SGOT) U/L  --  23 42*   ALT (SGPT) U/L  --  8 9     Results from last 7 days   Lab Units 02/16/23  0356 02/15/23  0343 02/13/23  2208 02/13/23  1148   CALCIUM mg/dL 8.6 8.7 8.7 9.1   ALBUMIN g/dL 3.5 3.6  --  3.7   PHOSPHORUS mg/dL 4.8*  --   --   --        Glucose   Date/Time Value Ref Range Status   02/15/2023 0958 131 (H) 70 - 130 mg/dL Final     Comment:     Meter: [id number] : [id number] Melo DUNHAM RN       CT Abdomen Pelvis With Contrast    Result Date: 2/14/2023   1.  Findings of anasarca, including small bilateral pleural effusions with mild adjacent atelectasis, a small pericardial effusion, diffuse mesenteric edema and small volume free fluid, and mild diffuse body wall edema. 2.  Diffuse gastric wall thickening and mucosal hyperenhancement, incompletely assessed without oral contrast, which could be due to poor distention but could also reflect a nonspecific gastritis or sequela of the patient's overall fluid status. 3.  Diffusely hypodense pancreas. Correlate with serum lipase to evaluate for possible pancreatitis. 4.  Increased spleen size with a diffuse mottled appearance of the parenchyma, which cannot be compared to the prior noncontrast enhanced examination and is nonspecific. 5.  Diffuse bladder wall thickening may be due to poor distention, however, correlate with urinalysis if there is clinical  concern for acute cystitis.  This report was finalized on 2/14/2023 4:03 PM by Dr. Shea Celestin M.D.      I have personally reviewed all medications:  Scheduled Medications  carvedilol, 25 mg, Oral, Q12H  epoetin cari/cari-epbx, 10,000 Units, Subcutaneous, Once per day on Mon Wed Fri  famotidine, 20 mg, Oral, Daily  gabapentin, 100 mg, Oral, Q12H  hydrALAZINE, 25 mg, Oral, Q8H  lacosamide, 50 mg, Oral, Q12H  mycophenolate, 250 mg, Oral, Q12H  NIFEdipine XL, 60 mg, Oral, BID  predniSONE, 10 mg, Oral, Daily  vancomycin, 125 mg, Oral, Q6H    Infusions  Pharmacy Consult,     Diet  Diet: Cardiac Diets; Healthy Heart (2-3 Na+); Texture: Regular Texture (IDDSI 7); Fluid Consistency: Thin (IDDSI 0)    I have personally reviewed:  [x]  Laboratory   [x]  Microbiology   [x]  Radiology   [x]  EKG/Telemetry  [x]  Cardiology/Vascular        Assessment/Plan     Active Hospital Problems    Diagnosis  POA   • **Hypertensive urgency [I16.0]  Yes   • Pericardial effusion [I31.39]  Yes   • ESRD (end stage renal disease) ([facility]) [N18.6]  Yes   • Hemodialysis status ([facility]) [Z99.2]  Not Applicable   • Seizure disorder ([facility]) [G40.909]  Yes   • Elevated liver function tests [R79.89]  Yes   • C. difficile colitis [A04.72]  Yes   • Systemic lupus erythematosus ([facility]) [M32.9]  Yes   • Pancytopenia ([facility]) [D61.818]  Yes      Resolved Hospital Problems   No resolved problems to display.       32 y.o. female admitted with Hypertensive urgency.    Essential Hypertension complicated by Hypertensive urgency  Pericardial effusion  - Patient last echo on 2/3/2023 revealed a normal ejection fraction, left ventricular strain, left ventricular hypertrophy, grade 1 diastolic dysfunction, left atrial enlargement, significant pericardial effusion.  Repeat 2D echo noted with no changes and persistent pericardial effusion which could be secondary to her renal failure or lupus.  No evidence of cardiac tamponade.  Troponin is elevated but declining.  Elevated  "proBNP.  CT scan of the brain with atrophy and small vessel disease with no acute event.  Chest x-ray with a small atelectasis versus effusion in the left lung base.  EKG with normal sinus rhythm, left atrial enlargement, left anterior fascicular block, T wave inversion in the lateral leads with ST elevation in the anterior leads (old changes).  There is no evidence of angina or congestive heart failure.    - Cardiology consulted and evaluated patient. Per most recent note: \"Her pericardial effusion is small to moderate and stable in appearance.  There is no indication for pericardiocentesis at this time. We will pursue further work-up of her left ventricular hypertrophy as an outpatient.\" Greatly appreciate their help.  - Will continue Coreg/Adalat.  She did not have to start Cardene drip.  Continue as needed hydralazine IV.  Minoxidil d/c per Nephrology.  - Will monitor blood pressure closely.  Elevation of the proBNP and troponin is most likely secondary to renal failure.    End-stage renal disease  - On hemodialysis T/TH/sat.  Patient appears to be euvolemic.    - Nephrology is following. Will continue to follow their plans/recommendations. Greatly appreciate their help.      Abdominal pain secondary to C. difficile infection  - CT scan of the abdomen and pelvis reveals anasarca, and splenomegaly.  Normal liver function test..  Normal lipase. Benign GI examination.  GI checking biochemical markers of the liver.    - Continue PO vancomycin as prescribed.    - Follow up GI plans/recommendations, appreciate their help.    Pancytopenia  -  WBC, hemoglobin and platelets remain low, however, relatively stable from prior. Elevated IPF.  CT scan of the abdomen revealed no lymphadenopathy but positive splenomegaly.  Above could be secondary to medication/splenomegaly/bone marrow suppression.   - Oncology consulted and following. Will continue to follow their plans/recommendations. Greatly appreciate their help.  - Order " repeat CBC in AM for reassessment.    SLE  - Continue Plaquenil/prednisone and CellCept as prescribed.     Seizures  - Continue Vimpat as prescribed.    · SCDs for DVT prophylaxis.  · Full code.  · Discussed with patient, nursing staff, CCP and care team on multidisciplinary rounds.  · Anticipate discharge home when cleared by consultants.      Hernan Corcoran DO  [facility]  02/16/23  10:08 EST

## 2023-02-16 NOTE — PROGRESS NOTES
.      [facility] Progress Note      Patient Name: Dee Herrera  : 1990  MRN: [id number]  Primary Care Physician:  Bobby Corey MD  Date of admission: 2023    Subjective     Interval History:     Patient lying in bed  Continues to have some abdominal distention  Decreased appetite tolerating oral intake without nausea or emesis    No fevers or chills    Awaiting hemodialysis today    Review of Systems:   As noted above    Objective     Vitals:   Temp:  [98.7 °F (37.1 °C)-99.4 °F (37.4 °C)] 98.9 °F (37.2 °C)  Heart Rate:  [] 102  Resp:  [18] 18  BP: (123-151)/() 136/97    Intake/Output Summary (Last 24 hours) at 2023 0727  Last data filed at 2023 0308  Gross per 24 hour   Intake 1260 ml   Output --   Net 1260 ml       Physical Exam:    General Appearance: Chronically ill and deconditioned  Skin: warm and dry  HEENT: oral mucosa normal, nonicteric sclera  Neck: supple, no JVD.  Tunneled hemodialysis catheter in place in right upper chest  Lungs: CTA  Heart: RRR, normal S1 and S2 with systolic ejection murmur  Abdomen: soft, nontender, nondistended  : no palpable bladder  Extremities: no edema, cyanosis or clubbing  Neuro: normal speech and mental status     Scheduled Meds:     carvedilol, 25 mg, Oral, Q12H  famotidine, 20 mg, Oral, Daily  gabapentin, 100 mg, Oral, Q12H  hydrALAZINE, 25 mg, Oral, Q8H  lacosamide, 50 mg, Oral, Q12H  mycophenolate, 250 mg, Oral, Q12H  NIFEdipine XL, 60 mg, Oral, BID  predniSONE, 10 mg, Oral, Daily  vancomycin, 125 mg, Oral, Q6H      IV Meds:   Pharmacy Consult,         Results Reviewed:   I have personally reviewed the results from the time of this admission to 2023 07:27 EST     Results from last 7 days   Lab Units 23  0356 02/15/23  0343 23  2208 23  1148   SODIUM mmol/L 136 135* 134* 135*   POTASSIUM mmol/L 5.0 3.9 4.6 4.7   CHLORIDE mmol/L 103 100 96* 96*   CO2 mmol/L 26.2 27.1 30.0* 30.0*    BUN mg/dL 20 8 17 14   CREATININE mg/dL 6.80* 4.63* 7.53* 6.91*   CALCIUM mg/dL 8.6 8.7 8.7 9.1   BILIRUBIN mg/dL  --  0.6  --  1.2   ALK PHOS U/L  --  40  --  41   ALT (SGPT) U/L  --  8  --  9   AST (SGOT) U/L  --  23  --  42*   GLUCOSE mg/dL 104* 114* 108* 89       Estimated Creatinine Clearance: 10 mL/min (A) (by C-G formula based on SCr of 6.8 mg/dL (H)).    Results from last 7 days   Lab Units 02/16/23  0356   PHOSPHORUS mg/dL 4.8*             Results from last 7 days   Lab Units 02/16/23  0356 02/15/23  1543 02/15/23  0343 02/14/23  1253 02/14/23  0348 02/13/23  1148   WBC 10*3/mm3 2.43*  --  1.92*  --  2.51* 2.46*   HEMOGLOBIN g/dL 7.9*  --  9.4*  --  9.0* 10.4*   PLATELETS 10*3/mm3 74* 59* 52* 31* 27* 26*             Assessment / Plan     ASSESSMENT:       -End Stage Renal Disease ( ESRD ) on Hemodialysis .s/p  [facility] TDC  functional .Continue to arrange hemodialysis treatment during his admission. Continue renal diet   -Chronic normocytic anemia. On Epogen protocol.10.000 SC TIW  We will continue to follow H&H closely    -Hyperphosphatemia. Continue binders  with meals, Continue renal diet. We will follow phosphorus to make further adjustments  -Hypertension from ESRD .  Continue current regimen carvedilol, hydralazine, nifedipine. We will continue to follow closely.     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  Patient was seen by rheumatology in January 2023  -Pancytopenia as per hematology  -Moderate Pericardial effusion.  Avoiding minoxidil  -C. difficile on vancomycin oral as per GI     PLAN:    -We will arrange for hemodialysis during her admission on Tuesday, Thursday and Saturday schedule  -Patient is scheduled for hemodialysis today  -We will continue surveillance labs    Thank you for involving us in the care of Dee Herrera.  Please feel free to call with any questions.    Hair Mckeon,  MD  02/16/23  07:27 [facility]    [facility]  [phone]    Please note that portions of this note were completed with a voice recognition program.

## 2023-02-17 LAB
ALBUMIN SERPL-MCNC: 3.6 G/DL (ref 3.5–5.2)
ANION GAP SERPL CALCULATED.3IONS-SCNC: 6.8 MMOL/L (ref 5–15)
BASOPHILS # BLD AUTO: 0 10*3/MM3 (ref 0–0.2)
BASOPHILS NFR BLD AUTO: 0 % (ref 0–1.5)
BUN SERPL-MCNC: 13 MG/DL (ref 6–20)
BUN/CREAT SERPL: 2.8 (ref 7–25)
CALCIUM SPEC-SCNC: 8.6 MG/DL (ref 8.6–10.5)
CHLORIDE SERPL-SCNC: 99 MMOL/L (ref 98–107)
CO2 SERPL-SCNC: 25.2 MMOL/L (ref 22–29)
CREAT SERPL-MCNC: 4.62 MG/DL (ref 0.57–1)
DEPRECATED RDW RBC AUTO: 51 FL (ref 37–54)
EGFRCR SERPLBLD CKD-EPI 2021: 12.4 ML/MIN/1.73
EOSINOPHIL # BLD AUTO: 0 10*3/MM3 (ref 0–0.4)
EOSINOPHIL NFR BLD AUTO: 0 % (ref 0.3–6.2)
ERYTHROCYTE [DISTWIDTH] IN BLOOD BY AUTOMATED COUNT: 19.2 % (ref 12.3–15.4)
GLUCOSE SERPL-MCNC: 120 MG/DL (ref 65–99)
HCT VFR BLD AUTO: 28.3 % (ref 34–46.6)
HGB BLD-MCNC: 8.5 G/DL (ref 12–15.9)
LYMPHOCYTES # BLD AUTO: 0.29 10*3/MM3 (ref 0.7–3.1)
LYMPHOCYTES NFR BLD AUTO: 12.2 % (ref 19.6–45.3)
MCH RBC QN AUTO: 23.7 PG (ref 26.6–33)
MCHC RBC AUTO-ENTMCNC: 30 G/DL (ref 31.5–35.7)
MCV RBC AUTO: 79.1 FL (ref 79–97)
MONOCYTES # BLD AUTO: 0.25 10*3/MM3 (ref 0.1–0.9)
MONOCYTES NFR BLD AUTO: 10.5 % (ref 5–12)
NEUTROPHILS NFR BLD AUTO: 1.82 10*3/MM3 (ref 1.7–7)
NEUTROPHILS NFR BLD AUTO: 76.5 % (ref 42.7–76)
PHOSPHATE SERPL-MCNC: 3.2 MG/DL (ref 2.5–4.5)
PLATELET # BLD AUTO: 78 10*3/MM3 (ref 140–450)
POTASSIUM SERPL-SCNC: 4.9 MMOL/L (ref 3.5–5.2)
RBC # BLD AUTO: 3.58 10*6/MM3 (ref 3.77–5.28)
SODIUM SERPL-SCNC: 131 MMOL/L (ref 136–145)
WBC NRBC COR # BLD: 2.38 10*3/MM3 (ref 3.4–10.8)

## 2023-02-17 PROCEDURE — 99232 SBSQ HOSP IP/OBS MODERATE 35: CPT | Performed by: INTERNAL MEDICINE

## 2023-02-17 PROCEDURE — 99232 SBSQ HOSP IP/OBS MODERATE 35: CPT | Performed by: NURSE PRACTITIONER

## 2023-02-17 PROCEDURE — 63710000001 MYCOPHENOLATE MOFETIL PER 250 MG: Performed by: INTERNAL MEDICINE

## 2023-02-17 PROCEDURE — 85025 COMPLETE CBC W/AUTO DIFF WBC: CPT | Performed by: INTERNAL MEDICINE

## 2023-02-17 PROCEDURE — 63710000001 PREDNISONE PER 5 MG: Performed by: INTERNAL MEDICINE

## 2023-02-17 PROCEDURE — 80069 RENAL FUNCTION PANEL: CPT | Performed by: INTERNAL MEDICINE

## 2023-02-17 RX ADMIN — VANCOMYCIN HYDROCHLORIDE 125 MG: 125 CAPSULE ORAL at 11:56

## 2023-02-17 RX ADMIN — HYDRALAZINE HYDROCHLORIDE 25 MG: 25 TABLET, FILM COATED ORAL at 21:04

## 2023-02-17 RX ADMIN — CARVEDILOL 25 MG: 25 TABLET, FILM COATED ORAL at 21:04

## 2023-02-17 RX ADMIN — NIFEDIPINE 60 MG: 60 TABLET, FILM COATED, EXTENDED RELEASE ORAL at 09:39

## 2023-02-17 RX ADMIN — VANCOMYCIN HYDROCHLORIDE 125 MG: 125 CAPSULE ORAL at 03:39

## 2023-02-17 RX ADMIN — GABAPENTIN 100 MG: 100 CAPSULE ORAL at 21:04

## 2023-02-17 RX ADMIN — CARVEDILOL 25 MG: 25 TABLET, FILM COATED ORAL at 09:39

## 2023-02-17 RX ADMIN — FAMOTIDINE 20 MG: 20 TABLET, FILM COATED ORAL at 09:38

## 2023-02-17 RX ADMIN — LACOSAMIDE 50 MG: 100 TABLET, FILM COATED ORAL at 21:04

## 2023-02-17 RX ADMIN — LACOSAMIDE 50 MG: 100 TABLET, FILM COATED ORAL at 09:37

## 2023-02-17 RX ADMIN — PREDNISONE 10 MG: 10 TABLET ORAL at 09:38

## 2023-02-17 RX ADMIN — HYDRALAZINE HYDROCHLORIDE 25 MG: 25 TABLET, FILM COATED ORAL at 06:59

## 2023-02-17 RX ADMIN — MYCOPHENOLATE MOFETIL 250 MG: 500 TABLET ORAL at 21:04

## 2023-02-17 RX ADMIN — VANCOMYCIN HYDROCHLORIDE 125 MG: 125 CAPSULE ORAL at 06:59

## 2023-02-17 RX ADMIN — VANCOMYCIN HYDROCHLORIDE 125 MG: 125 CAPSULE ORAL at 17:01

## 2023-02-17 RX ADMIN — GABAPENTIN 100 MG: 100 CAPSULE ORAL at 09:37

## 2023-02-17 RX ADMIN — HYDRALAZINE HYDROCHLORIDE 25 MG: 25 TABLET, FILM COATED ORAL at 14:03

## 2023-02-17 RX ADMIN — NIFEDIPINE 60 MG: 60 TABLET, FILM COATED, EXTENDED RELEASE ORAL at 21:05

## 2023-02-17 RX ADMIN — MYCOPHENOLATE MOFETIL 250 MG: 500 TABLET ORAL at 09:38

## 2023-02-17 NOTE — CASE MANAGEMENT/SOCIAL WORK
Continued Stay Note  [facility]     Patient Name: Dee Herrera  MRN: [id number]  Today's Date: 2/17/2023    Admit Date: 2/13/2023    Plan: Home. Continue HD TTS at  [facility]. Family to transport at D/C.  C diff +. Enrolled in meds to bed. Pt states unable to afford meds but ran cost and they all come back $0 copay.   Discharge Plan     Row Name 02/17/23 1339       Plan    Plan Home. Continue HD TTS at  [facility]. Family to transport at D/C.  C diff +. Enrolled in meds to bed. Pt states unable to afford meds but ran cost and they all come back $0 co pay.    Patient/Family in Agreement with Plan yes    Plan Comments Nephrology asked to check cost of Nifedipine as pt states she is unable to afford. Per pharmacy, Nifedipine is $0 co pay. Pt is enrolled in meds to bed so should have all meds at D/C. Per attending, plan is for D/C tomorrow after HD. Ian Holley RN-BC               Discharge Codes    No documentation.               Expected Discharge Date and Time     Expected Discharge Date Expected Discharge Time    Feb 18, 2023             Ian Holley RN

## 2023-02-17 NOTE — PROGRESS NOTES
.      [facility] Progress Note      Patient Name: Dee Herrera  : 1990  MRN: [id number]  Primary Care Physician:  Bobby Corey MD  Date of admission: 2023    Subjective     Interval History:     Patient on feeling better  More interactive and eating lunch  Denies any chest pain, shortness of breath  Nausea much improved    Review of Systems:   As noted above    Objective     Vitals:   Temp:  [98 °F (36.7 °C)-98.9 °F (37.2 °C)] 98 °F (36.7 °C)  Heart Rate:  [] 95  Resp:  [18-20] 20  BP: (122-137)/() 131/93    Intake/Output Summary (Last 24 hours) at 2023 1446  Last data filed at 2023 0903  Gross per 24 hour   Intake 490 ml   Output --   Net 490 ml       Physical Exam:    General Appearance: Chronically ill and deconditioned  Skin: warm and dry  HEENT: oral mucosa normal, nonicteric sclera  Neck: supple, no JVD.  Tunneled hemodialysis catheter in place in right upper chest  Lungs: CTA  Heart: RRR, normal S1 and S2 with systolic ejection murmur  Abdomen: soft, nontender, nondistended  : no palpable bladder  Extremities: no edema, cyanosis or clubbing  Neuro: normal speech and mental status     Scheduled Meds:     carvedilol, 25 mg, Oral, Q12H  epoetin cari/cari-epbx, 10,000 Units, Subcutaneous, Once per day on   famotidine, 20 mg, Oral, Daily  gabapentin, 100 mg, Oral, Q12H  hydrALAZINE, 25 mg, Oral, Q8H  lacosamide, 50 mg, Oral, Q12H  mycophenolate, 250 mg, Oral, Q12H  NIFEdipine XL, 60 mg, Oral, BID  predniSONE, 10 mg, Oral, Daily  vancomycin, 125 mg, Oral, Q6H      IV Meds:   Pharmacy Consult,         Results Reviewed:   I have personally reviewed the results from the time of this admission to 2023 14:46 EST     Results from last 7 days   Lab Units 23  0424 23  0356 02/15/23  0343 23  2208 02/13/23  1148   SODIUM mmol/L 131* 136 135*   < > 135*   POTASSIUM mmol/L 4.9 5.0 3.9   < > 4.7   CHLORIDE mmol/L 99 103  100   < > 96*   CO2 mmol/L 25.2 26.2 27.1   < > 30.0*   BUN mg/dL 13 20 8   < > 14   CREATININE mg/dL 4.62* 6.80* 4.63*   < > 6.91*   CALCIUM mg/dL 8.6 8.6 8.7   < > 9.1   BILIRUBIN mg/dL  --   --  0.6  --  1.2   ALK PHOS U/L  --   --  40  --  41   ALT (SGPT) U/L  --   --  8  --  9   AST (SGOT) U/L  --   --  23  --  42*   GLUCOSE mg/dL 120* 104* 114*   < > 89    < > = values in this interval not displayed.       Estimated Creatinine Clearance: 14.2 mL/min (A) (by C-G formula based on SCr of 4.62 mg/dL (H)).    Results from last 7 days   Lab Units 02/17/23  0424 02/16/23  0356   PHOSPHORUS mg/dL 3.2 4.8*             Results from last 7 days   Lab Units 02/17/23  0424 02/16/23  0356 02/15/23  1543 02/15/23  0343 02/14/23  1253 02/14/23  0348 02/13/23  1148   WBC 10*3/mm3 2.38* 2.43*  --  1.92*  --  2.51* 2.46*   HEMOGLOBIN g/dL 8.5* 7.9*  --  9.4*  --  9.0* 10.4*   PLATELETS 10*3/mm3 78* 74* 59* 52* 31* 27* 26*             Assessment / Plan     ASSESSMENT:       -End Stage Renal Disease ( ESRD ) on Hemodialysis .s/p  [facility] TDC  functional .Continue to arrange hemodialysis treatment during his admission. Continue renal diet   -Chronic normocytic anemia. On Epogen protocol.10.000 SC TIW  We will continue to follow H&H closely   -Pancytopenia .  Followed by hematology appreciate input   -Hyperphosphatemia.  Continue renal diet. We will follow phosphorus to make further binders  adjustments if needed  -Hypertension from ESRD .  Continue current regimen carvedilol, hydralazine, nifedipine. We will continue to follow closely.   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  Patient was seen by rheumatology in January 2023  -Pancytopenia as per hematology  -Moderate Pericardial effusion.  Avoiding minoxidil  -C. difficile on vancomycin oral every 6 hours  as per GI     PLAN:    -We will arrange for hemodialysis during  her admission on Tuesday, Thursday and Saturday schedule  -Patient is scheduled for hemodialysis tomorrow, seems like volume status slowly improving with dialysis and after discontinuation of minoxidil  -We will continue surveillance labs    Discussed in detail with primary team    Thank you for involving us in the care of Dee Herrera.  Please feel free to call with any questions.    Hair Mckeon MD  02/17/23  14:46 [facility]    [facility]  [phone]    Please note that portions of this note were completed with a voice recognition program.

## 2023-02-17 NOTE — PROGRESS NOTES
Subjective  pancytopenia, , SLE, CKD, LIVER TEST ABNORMALITY, SPLENOMEGALY, ANASARCA, HYPERTENSIVE URGENCY        History of Present Illness  On 02/15/2023 this 32-year-old  female who has been seen by my partner, Tiesha Ocasio MD, in the past has been admitted to the hospital with a hypertensive emergency and we have been consulted in regard to pancytopenia. The patient is known for having systemic lupus erythematosus after a diagnosis was made through a kidney biopsy and serological analysis. She has been in therapy for this. Unfortunately her kidney function has been terrible, the patient is now undergoing dialysis and she continues undergoing therapy for her lupus. The patient has been admitted to the hospital because she had a blood pressure systolic of 230 in outside facility clinic and obviously for this reason was admitted for proper control. The Nephrology team already is taking care of this.      At the time of the visit the patient was eating fried chicken by the bucket and she was not having any symptoms at all. She was not having any pain, shortness of breath, cough, sputum production, nausea, vomiting, abdominal distention or passage of blood in the stool. She was not having any rashes in the skin and she wanted to go home.      She has not had any recent fever or infection. Her dialysis catheter has remained functional.    On 02/16/2023 the patient is undergoing dialysis. She does not want to wait any longer to go home. She is eating well even though she has some element of abdominal pain today. She has not had any nausea or vomiting, good bowel activity. No cough or shortness of breath. No bleeding, no fever.     On 02/17/2023 the patient has no longer abdominal pain. She is eating regular diet. Her bowel activity is ongoing. She is having some urine output and she has no external bleeding. Her cath for dialysis is not having any difficulties. She has not had any fevers or chills.  She will have an appointment to see Rheumatology the next time since her diagnosis of lupus next week. IT IS CRUCIAL TO KEEP THIS PATIENT READY TO HAVE THAT APPOINTMENT DONE BECAUSE THE MANAGEMENT OF HER LUPUS REQUIRES THAT IMPORTANT PHYSICIAN IN THE TEAM.        Past Medical History, Past Surgical History, Social History, Family History have been reviewed and are without significant changes except as mentioned.    Review of Systems   Constitutional: Negative.    HENT: Negative.    Respiratory: Negative.    Cardiovascular: Negative.    Gastrointestinal: Negative.    Genitourinary: Negative.    Musculoskeletal: Negative.    Skin: Negative.    Neurological: Negative.    Hematological: Negative.         Medications:  The current medication list was reviewed in the EMR    ALLERGIES:    Allergies   Allergen Reactions   • Minoxidil Other (See Comments)     Pericardial effusion .       Objective      Vitals:    02/16/23 2031 02/17/23 0024 02/17/23 0500 02/17/23 0758   BP: (!) 137/101 122/80  124/88   BP Location: Left arm Left arm  Right arm   Patient Position: Lying Lying  Lying   Pulse: 104 95  89   Resp: 18 18  18   Temp: 98.7 °F (37.1 °C) 98.9 °F (37.2 °C)  98.7 °F (37.1 °C)   TempSrc: Oral Oral     SpO2: 97% 97%  96%   Weight:   50.6 kg (111 lb 9.6 oz)    Height:         Current Status 11/2/2022   ECOG score 0       Physical Exam  Constitutional:       Appearance: She is not ill-appearing, toxic-appearing or diaphoretic.   HENT:      Head: Normocephalic.   Eyes:      General: No scleral icterus.  Cardiovascular:      Rate and Rhythm: Normal rate and regular rhythm.      Pulses: Normal pulses.      Heart sounds: Normal heart sounds.   Pulmonary:      Effort: Pulmonary effort is normal. No respiratory distress.      Breath sounds: Normal breath sounds. No wheezing, rhonchi or rales.   Abdominal:      General: There is no distension.      Palpations: Abdomen is soft. There is no mass.      Tenderness: There is no  abdominal tenderness. There is no guarding or rebound.      Hernia: No hernia is present.   Musculoskeletal:      Right lower leg: Edema present.      Left lower leg: Edema present.   Lymphadenopathy:      Cervical: No cervical adenopathy.   Skin:     General: Skin is warm and dry.   Neurological:      Mental Status: She is alert. Mental status is at baseline.           RECENT LABS:  Hematology WBC   Date Value Ref Range Status   02/17/2023 2.38 (L) 3.40 - 10.80 10*3/mm3 Final     RBC   Date Value Ref Range Status   02/17/2023 3.58 (L) 3.77 - 5.28 10*6/mm3 Final     Hemoglobin   Date Value Ref Range Status   02/17/2023 8.5 (L) 12.0 - 15.9 g/dL Final     Hematocrit   Date Value Ref Range Status   02/17/2023 28.3 (L) 34.0 - 46.6 % Final     Platelets   Date Value Ref Range Status   02/17/2023 78 (L) 140 - 450 10*3/mm3 Final           Assessment & Plan      On 02/15/2023 this 32-year-old female has been admitted to the hospital with hypertensive crisis being treated by Nephrology and successfully coming down in regard to blood pressure. We have been consulted because the patient has pancytopenia with low white count, hemoglobin and platelets. These numbers are a little bit worse than the number that she had before when she had again pancytopenia and she was worked up by partner, Dr. Tiesha Ocasio, at the time that her lupus diagnosis was made last year in 2022.      It is very obviously radiologically speaking that the patient has large volume expansion raising blood pressure and also producing ascites, liver enlargement, splenomegaly and very likely sequestration of white cells and platelets by her enlarged spleen. It is impossible to separate how much of the pancytopenia is also related to lupus per se because this condition also typically can produce autoimmune thrombocytopenia and autoimmune neutropenia. I am sure that the patient has been receiving erythropoietin and IV iron by the nephrology team. I think it is  worth it to obtain an immature platelet fraction and reticulated hemoglobin and  things in that way.      Besides all these things obviously the patient needs to have frequent dialysis per Nephrology team to remove excessive amount of water that she has. The patient is eating at the time of the visit by the buckets, fried chicken. Raises the question if she grabs the idea of sodium restriction in her diet and things of this nature. To me the idea is that she does not have it. In any event, this will be addressed I am sure by the Nephrology team.      Therefore I went ahead and ordered an immature platelet fraction, obtained also an LDH and also obtained a reticulated hemoglobin. Right now I do not believe that the patient needs to have any blood products of any nature, neither do I need to pursue bone marrow testing.      We will review hematological parameters including CBC differential on daily basis. I do not expect these numbers to change too much over time.     I discussed all these facts with the patient's mother and grandmother present in the room.   On 02/16/2023 the patient has stated to me that she will leave the hospital today no matter what after dialysis is completed. From my point of view her white count is 2700 a little bit better, hemoglobin is stable and her platelet count actually with mild improvement.     Her immature platelet fraction is elevated telling me that the patient is producing platelets, the bone marrow is active doing its job and that is good news. Obviously it is impossible for me to separate her pancytopenia from sequestration by the spleen being so enlarged and by also the effect of lupus on her. Therefore obviously the difficulty is what to do. At least she has no need for blood products today, she is not bleeding, she does not look like she is infected and I have no other suggestions.    The reticulated hemoglobin is low, it is very likely that this patient has a component  of alpha thalassemia. As I posted yesterday I am sure that the primary team Nephrology is giving her erythropoietin and IV iron therapy in the normal routine of her healthcare. That is why I will not suggest to proceed with any IV iron therapy at this time.    There is not too much else that I can to this patient's care at this point.     Available if needed for any other issues or concerns.   On 02/17/2023 the patient is feeling better. She really wants to go home. It will be up to her nephrology team and the hospitalist to make this decision, it is not me.     Her white count with minimal improvement, hemoglobin is about the same, platelet count is about the same. She has no infection and no bleeding at this time.     I do believe that this patient's hematological picture is a major combination of factors, medication affect, lupus activity and so forth even though her lupus on monitoring numbers in regard to GIANFRANCO and so forth looks substantially better than before. IT HAPPENS TO BE THAT THE PATIENT HAS NEVER SEEN A RHEUMATOLOGIST FOR THE MANAGEMENT OF HER LUPUS. THIS EVENT IS GOING TO HAPPEN NEXT WEEK AND WE NEED TO PROVIDE THIS PATIENT WITH ALL THE TOOLS TO BE ABLE TO MAKE IT FOR THAT APPOINTMENT THAT IS CRUCIAL IN REGARD TO HER GENERAL CARE AND HER FUTURE AS A HUMAN BEING.     We have nothing else to add to the patient's care at this time.     We will sign off on her care.     She will not be seen in the office on a routine basis.      ·   DURING THE VISIT WITH THE PATIENT TODAY , PATIENT HAD FACE MASK, I HAD PROPER PROTECTIVE EQUIPMENT, AND I DID HAND HYGIENE WITH SOAP AND WATER BEFORE AND AFTER THE VISIT.                     2/17/2023      CC:

## 2023-02-17 NOTE — NURSING NOTE
VSS. Denies pain.  Central line dressing change will be Feb 23. No complaints or concerns per pt. Safety maintained . Will continue to monitor.

## 2023-02-17 NOTE — PROGRESS NOTES
Gastroenterology   Inpatient Progress Note    Reason for Follow Up: Abdominal pain    Subjective     Interval History:       Current Facility-Administered Medications:   •  acetaminophen (TYLENOL) tablet 650 mg, 650 mg, Oral, Q4H PRN, Laurence Cedeno MD, 650 mg at 02/13/23 2138  •  carvedilol (COREG) tablet 25 mg, 25 mg, Oral, Q12H, Eduardo Hendricks MD, 25 mg at 02/17/23 0939  •  epoetin cari-epbx (RETACRIT) injection 10,000 Units, 10,000 Units, Subcutaneous, Once per day on Mon Wed Fri, Hair Mckeon MD, 10,000 Units at 02/16/23 1542  •  famotidine (PEPCID) tablet 20 mg, 20 mg, Oral, Daily, Lakisha Hunt MD, 20 mg at 02/17/23 0938  •  gabapentin (NEURONTIN) capsule 100 mg, 100 mg, Oral, Q12H, Lakisha Hunt MD, 100 mg at 02/17/23 0937  •  heparin (porcine) injection 4,000 Units, 4,000 Units, Intracatheter, PRN, Eduardo Hendricks MD, 4,000 Units at 02/16/23 1340  •  hydrALAZINE (APRESOLINE) injection 10 mg, 10 mg, Intravenous, Q6H PRN, Lakisha Hunt MD, 10 mg at 02/15/23 0236  •  hydrALAZINE (APRESOLINE) tablet 25 mg, 25 mg, Oral, Q8H, Regi Hyde MD, 25 mg at 02/17/23 0659  •  lacosamide (VIMPAT) tablet 50 mg, 50 mg, Oral, Q12H, Laurence Cedeno MD, 50 mg at 02/17/23 0937  •  melatonin tablet 3 mg, 3 mg, Oral, Nightly PRN, Laurence Cedeno MD, 3 mg at 02/13/23 2138  •  mycophenolate (CELLCEPT) tablet 250 mg, 250 mg, Oral, Q12H, Regi Hyde MD, 250 mg at 02/17/23 0938  •  NIFEdipine XL (PROCARDIA XL) 24 hr tablet 60 mg, 60 mg, Oral, BID, Laurence Cedeno MD, 60 mg at 02/17/23 0939  •  ondansetron (ZOFRAN) tablet 4 mg, 4 mg, Oral, Q6H PRN **OR** ondansetron (ZOFRAN) injection 4 mg, 4 mg, Intravenous, Q6H PRN, Laurence Cedeno MD, 4 mg at 02/15/23 6482  •  Pharmacy Consult, , Does not apply, Continuous PRN, Lakisha Hunt MD  •  predniSONE (DELTASONE) tablet 10 mg, 10 mg, Oral, Daily, Hair Mckeon MD, 10 mg at 02/17/23 0938  •   simethicone (MYLICON) chewable tablet 80 mg, 80 mg, Oral, 4x Daily PRN, Bisi Winn PA  •  [COMPLETED] Insert Peripheral IV, , , Once **AND** sodium chloride 0.9 % flush 10 mL, 10 mL, Intravenous, PRN, Anthony Hawtohrne MD, 10 mL at 02/15/23 0841  •  vancomycin (VANCOCIN) capsule 125 mg, 125 mg, Oral, Q6H, Lakisha Hunt MD, 125 mg at 02/17/23 1156  Review of Systems:    All systems were reviewed and negative except for:  Gastrointestinal: positive for  pain    Objective     Vital Signs  Temp:  [97.8 °F (36.6 °C)-98.9 °F (37.2 °C)] 98 °F (36.7 °C)  Heart Rate:  [] 95  Resp:  [18-20] 20  BP: (122-137)/() 131/93  Body mass index is 18.57 kg/m².    Intake/Output Summary (Last 24 hours) at 2/17/2023 1323  Last data filed at 2/17/2023 0903  Gross per 24 hour   Intake 490 ml   Output 2500 ml   Net -2010 ml     I/O this shift:  In: 240 [P.O.:240]  Out: -      Physical Exam:   General: patient awake, alert and cooperative   Eyes: no scleral icterus   Skin: warm and dry, not jaundiced   Abdomen: soft, nontender, nondistended; normal bowel sounds, no masses palpated, no periumbical lymphadenopathy   Psychiatric: Appropriate affect and behavior     Results Review:     I reviewed the patient's new clinical results.  I reviewed the patient's new imaging results and agree with the interpretation.  I reviewed the patient's other test results and agree with the interpretation    Results from last 7 days   Lab Units 02/17/23  0424 02/16/23  0356 02/15/23  1543 02/15/23  0343   WBC 10*3/mm3 2.38* 2.43*  --  1.92*   HEMOGLOBIN g/dL 8.5* 7.9*  --  9.4*   HEMATOCRIT % 28.3* 27.5*  --  31.1*   PLATELETS 10*3/mm3 78* 74* 59* 52*     Results from last 7 days   Lab Units 02/17/23  0424 02/16/23  0356 02/15/23  0343 02/13/23  2208 02/13/23  1148   SODIUM mmol/L 131* 136 135*   < > 135*   POTASSIUM mmol/L 4.9 5.0 3.9   < > 4.7   CHLORIDE mmol/L 99 103 100   < > 96*   CO2 mmol/L 25.2 26.2 27.1   < > 30.0*   BUN mg/dL 13  20 8   < > 14   CREATININE mg/dL 4.62* 6.80* 4.63*   < > 6.91*   CALCIUM mg/dL 8.6 8.6 8.7   < > 9.1   BILIRUBIN mg/dL  --   --  0.6  --  1.2   ALK PHOS U/L  --   --  40  --  41   ALT (SGPT) U/L  --   --  8  --  9   AST (SGOT) U/L  --   --  23  --  42*   GLUCOSE mg/dL 120* 104* 114*   < > 89    < > = values in this interval not displayed.         Lab Results   Lab Value Date/Time    LIPASE 22 02/15/2023 0343    LIPASE 22 02/14/2023 2355    LIPASE 47 11/25/2022 2109    LIPASE 27 11/22/2022 1059    LIPASE 32 11/21/2022 2030       Radiology:  CT Abdomen Pelvis With Contrast   Final Result       1.  Findings of anasarca, including small bilateral pleural effusions   with mild adjacent atelectasis, a small pericardial effusion, diffuse   mesenteric edema and small volume free fluid, and mild diffuse body wall   edema.   2.  Diffuse gastric wall thickening and mucosal hyperenhancement,   incompletely assessed without oral contrast, which could be due to poor   distention but could also reflect a nonspecific gastritis or sequela of   the patient's overall fluid status.   3.  Diffusely hypodense pancreas. Correlate with serum lipase to   evaluate for possible pancreatitis.   4.  Increased spleen size with a diffuse mottled appearance of the   parenchyma, which cannot be compared to the prior noncontrast enhanced   examination and is nonspecific.    5.  Diffuse bladder wall thickening may be due to poor distention,   however, correlate with urinalysis if there is clinical concern for   acute cystitis.       This report was finalized on 2/14/2023 4:03 PM by Dr. Shea Celestin M.D.          CT Head Without Contrast   Final Result   1. Atrophy and minimal changes of small vessel ischemic disease. These   findings are somewhat advanced for a patient of this age.   2. No acute intracranial process.   3. Mild ethmoid sinusitis.   4. Findings were discussed with Dr. Hawthorne.           Radiation dose reduction techniques were  utilized, including automated   exposure control and exposure modulation based on body size.       This report was finalized on 2/13/2023 5:24 PM by Dr. Clyde Rayo M.D.          XR Chest 1 View   Final Result   Small left basilar atelectasis/infiltrate/effusion,   follow-up suggested. Cardiomegaly.       This report was finalized on 2/13/2023 12:18 PM by Dr. Norm Arshad M.D.              Assessment & Plan     Patient Active Problem List   Diagnosis   • Vitamin D deficiency   • Iron deficiency anemia   • Morning stiffness of joints   • Iron deficiency anemia, unspecified iron deficiency anemia type   • Thrombocytopenia (HCC)   • Acute renal failure (ARF) (HCC)   • Hypertension secondary to other renal disorders   • Pancytopenia (HCC)   • Hypoalbuminemia   • Elevated troponin   • Volume overload   • Ear drainage right   • Hypertensive urgency   • T.T.P. syndrome (HCC)   • Systemic lupus erythematosus (HCC)   • Lupus nephritis, ISN/RPS class IV (HCC)   • Hypokalemia   • Hypocalcemia   • COVID-19   • Hospital discharge follow-up   • Stage 5 chronic kidney disease (HCC)   • Pericardial effusion   • Cardiac cirrhosis   • Pancreatitis   • Duodenitis   • Regional enteritis of small bowel (HCC)   • Amyloid disease (HCC)   • Hypertensive urgency   • Pericardial effusion   • ESRD (end stage renal disease) (HCC)   • Hemodialysis status (HCC)   • Seizure disorder (HCC)   • Elevated liver function tests   • C. difficile colitis       Assessment:  1. Diffuse chronic abdominal pain  2. ESRD on HD, follows with  for possible transplant  3. SLE  4. Hypertension  5. Diarrhea-resolved.  C. difficile toxin PCR positive, antigen negative suggestive of colonization  6. Abnormal imaging of the pancreas suggestive of pancreatitis but clinical symptoms do not match.  Serum lipase level normal at 22 on 2/15/2023.  7. CT findings of possible gastric wall thickening/gastritis  8. Splenomegaly  9. Mildly elevated AST at  42  10. Ceruloplasmin at 17    These problems are new to me.  Plan:  · Continue oral vancomycin for C. difficile per primary team.  · Ceruloplasmin low at 17, we will order a serum copper level.  As patient produces very little urine, there would not be benefit from 24-hour urine copper collection.  · AMA, SMA negative  · GIANFRANCO positive-given patient's history of SLE  · Alpha 1 antitrypsin level still pending  · Patient reports occasional epigastric discomfort, but nothing significant.  She does not have a history of GERD per her report.  Discussion was held with patient regarding findings on imaging of possible gastric wall thickening/possible gastritis.  Patient amenable to outpatient follow-up with GI for EGD.  · Likely sign off tomorrow as long as serum copper level is normal and would recommend outpatient follow-up with GI with possible EGD.    I discussed the patients findings and my recommendations with patient and nursing staff.    CAESAR Lynn APRN  [facility] [address]  Office: [phone]

## 2023-02-17 NOTE — PROGRESS NOTES
[facility] Follow Up    Chief Complaint: Follow up pericardial effusion    Interval History: She appears better this morning.  She sitting up awake in the bed.  She denies any chest pain or shortness of breath.    Objective:     Objective:  Temp:  [97.8 °F (36.6 °C)-98.9 °F (37.2 °C)] 98.9 °F (37.2 °C)  Heart Rate:  [] 95  Resp:  [18] 18  BP: (122-137)/() 122/80     Intake/Output Summary (Last 24 hours) at 2/17/2023 0725  Last data filed at 2/17/2023 0300  Gross per 24 hour   Intake 250 ml   Output 2500 ml   Net -2250 ml     Body mass index is 18.57 kg/m².      02/15/23  0652 02/16/23  0500 02/17/23  0500   Weight: 54.7 kg (120 lb 9.5 oz) 52.3 kg (115 lb 4.8 oz) (STANDING SCALE) 50.6 kg (111 lb 9.6 oz)     Weight change: -1.678 kg (-3 lb 11.2 oz)      Physical Exam:   General : Alert, cooperative, in no acute distress.  Neuro: Alert,cooperative and oriented.  Lungs: CTAB. Normal respiratory effort and rate.  CV: Regular rate and rhythm, normal S1 and S2, no murmurs, gallops or rubs.  ABD: Soft, nontender, nondistended. Positive bowel sounds.  Extr: No edema or cyanosis, moves all extremities.    Lab Review:   Results from last 7 days   Lab Units 02/17/23  0424 02/16/23  0356 02/15/23  0343 02/13/23  2208 02/13/23  1148   SODIUM mmol/L 131* 136 135*   < > 135*   POTASSIUM mmol/L 4.9 5.0 3.9   < > 4.7   CHLORIDE mmol/L 99 103 100   < > 96*   CO2 mmol/L 25.2 26.2 27.1   < > 30.0*   BUN mg/dL 13 20 8   < > 14   CREATININE mg/dL 4.62* 6.80* 4.63*   < > 6.91*   GLUCOSE mg/dL 120* 104* 114*   < > 89   CALCIUM mg/dL 8.6 8.6 8.7   < > 9.1   AST (SGOT) U/L  --   --  23  --  42*   ALT (SGPT) U/L  --   --  8  --  9    < > = values in this interval not displayed.     Results from last 7 days   Lab Units 02/14/23  2355 02/13/23  2208 02/13/23  1148   HSTROP T ng/L 58* 68* 66*     Results from last 7 days   Lab Units 02/17/23  0424 02/16/23  0356   WBC 10*3/mm3 2.38* 2.43*   HEMOGLOBIN g/dL 8.5*  7.9*   HEMATOCRIT % 28.3* 27.5*   PLATELETS 10*3/mm3 78* 74*                   Invalid input(s): LDLCALC  Results from last 7 days   Lab Units 02/13/23  1148   PROBNP pg/mL >70,000.0*         I reviewed the patient's new clinical results.  I personally viewed and interpreted the patient's EKG  Current Medications:   Scheduled Meds:carvedilol, 25 mg, Oral, Q12H  epoetin cari/cari-epbx, 10,000 Units, Subcutaneous, Once per day on Mon Wed Fri  famotidine, 20 mg, Oral, Daily  gabapentin, 100 mg, Oral, Q12H  hydrALAZINE, 25 mg, Oral, Q8H  lacosamide, 50 mg, Oral, Q12H  mycophenolate, 250 mg, Oral, Q12H  NIFEdipine XL, 60 mg, Oral, BID  predniSONE, 10 mg, Oral, Daily  vancomycin, 125 mg, Oral, Q6H      Continuous Infusions:Pharmacy Consult,         Allergies:  Allergies   Allergen Reactions   • Minoxidil Other (See Comments)     Pericardial effusion .       Assessment/Plan:     1.  Pericardial effusion.    Small to moderate effusion that appears stable in appearance compared to her prior echocardiogram on 2/3.  Effusion appears small anteriorly and loculated and moderate in size posteriorly.  No evidence of tamponade by echo or clinically.  No plans for intervention.  2.  Hypertension.  Elevated pressures on admission due to recent noncompliance to medications.    Improved overall on current regimen.    3.  ESRD.  On hemodialysis.  Nephrology is following.  4.  C. difficile infection.  On oral vancomycin.  5.  Chronic abdominal pain.  6.  Anasarca.  Noted on recent CT scan due to pleural and pericardial effusions, mesenteric edema, and mild diffuse body wall edema.  As above her echocardiogram shows normal left ventricular systolic function but she certainly does have thickened left ventricle which could put her at risk for restrictive physiology.  7.  Systemic lupus erythematosus.  8.  Left ventricular hypertrophy.  Could certainly be due to longstanding hypertension and renal disease.  However appearance is also  concerning for amyloid.  She was to have a PYP scan today as an outpatient but this is now on hold.    - She is okay for discharge from my standpoint.  We will arrange for follow-up with me in about a month.    Juana Taylor MD  02/17/23  07:25 EST

## 2023-02-18 ENCOUNTER — READMISSION MANAGEMENT (OUTPATIENT)
Dept: CALL CENTER | Facility: HOSPITAL | Age: 33
End: 2023-02-18
Payer: MEDICAID

## 2023-02-18 ENCOUNTER — INPATIENT HOSPITAL (OUTPATIENT)
Dept: URBAN - METROPOLITAN AREA HOSPITAL 113 | Facility: HOSPITAL | Age: 33
End: 2023-02-18
Payer: MEDICAID

## 2023-02-18 VITALS
BODY MASS INDEX: 18.59 KG/M2 | OXYGEN SATURATION: 96 % | HEART RATE: 94 BPM | SYSTOLIC BLOOD PRESSURE: 132 MMHG | TEMPERATURE: 98.2 F | HEIGHT: 65 IN | DIASTOLIC BLOOD PRESSURE: 96 MMHG | WEIGHT: 111.6 LBS | RESPIRATION RATE: 18 BRPM

## 2023-02-18 DIAGNOSIS — R10.9 UNSPECIFIED ABDOMINAL PAIN: ICD-10-CM

## 2023-02-18 DIAGNOSIS — N18.6 END STAGE RENAL DISEASE: ICD-10-CM

## 2023-02-18 DIAGNOSIS — R16.1 SPLENOMEGALY, NOT ELSEWHERE CLASSIFIED: ICD-10-CM

## 2023-02-18 DIAGNOSIS — Z99.2 DEPENDENCE ON RENAL DIALYSIS: ICD-10-CM

## 2023-02-18 DIAGNOSIS — R93.3 ABNORMAL FINDINGS ON DIAGNOSTIC IMAGING OF OTHER PARTS OF DI: ICD-10-CM

## 2023-02-18 DIAGNOSIS — M32.9 SYSTEMIC LUPUS ERYTHEMATOSUS, UNSPECIFIED: ICD-10-CM

## 2023-02-18 DIAGNOSIS — I12.0 HYPERTENSIVE CHRONIC KIDNEY DISEASE WITH STAGE 5 CHRONIC KID: ICD-10-CM

## 2023-02-18 DIAGNOSIS — R74.01 ELEVATION OF LEVELS OF LIVER TRANSAMINASE LEVELS: ICD-10-CM

## 2023-02-18 PROBLEM — E43 SEVERE MALNUTRITION: Status: ACTIVE | Noted: 2023-02-18

## 2023-02-18 PROBLEM — D63.8 ANEMIA, CHRONIC DISEASE: Status: ACTIVE | Noted: 2023-02-18

## 2023-02-18 PROBLEM — I10 ESSENTIAL HYPERTENSION: Status: ACTIVE | Noted: 2023-02-18

## 2023-02-18 LAB
ALBUMIN SERPL-MCNC: 3.7 G/DL (ref 3.5–5.2)
ANION GAP SERPL CALCULATED.3IONS-SCNC: 10.4 MMOL/L (ref 5–15)
BASOPHILS # BLD AUTO: 0.01 10*3/MM3 (ref 0–0.2)
BASOPHILS NFR BLD AUTO: 0.4 % (ref 0–1.5)
BUN SERPL-MCNC: 27 MG/DL (ref 6–20)
BUN/CREAT SERPL: 4.1 (ref 7–25)
CALCIUM SPEC-SCNC: 8.9 MG/DL (ref 8.6–10.5)
CHLORIDE SERPL-SCNC: 100 MMOL/L (ref 98–107)
CO2 SERPL-SCNC: 25.6 MMOL/L (ref 22–29)
CREAT SERPL-MCNC: 6.62 MG/DL (ref 0.57–1)
DEPRECATED RDW RBC AUTO: 50.8 FL (ref 37–54)
EGFRCR SERPLBLD CKD-EPI 2021: 8.1 ML/MIN/1.73
EOSINOPHIL # BLD AUTO: 0.01 10*3/MM3 (ref 0–0.4)
EOSINOPHIL NFR BLD AUTO: 0.4 % (ref 0.3–6.2)
ERYTHROCYTE [DISTWIDTH] IN BLOOD BY AUTOMATED COUNT: 18.5 % (ref 12.3–15.4)
GLUCOSE SERPL-MCNC: 92 MG/DL (ref 65–99)
HCT VFR BLD AUTO: 28.4 % (ref 34–46.6)
HGB BLD-MCNC: 8.6 G/DL (ref 12–15.9)
LYMPHOCYTES # BLD AUTO: 0.76 10*3/MM3 (ref 0.7–3.1)
LYMPHOCYTES NFR BLD AUTO: 27.2 % (ref 19.6–45.3)
MCH RBC QN AUTO: 23.9 PG (ref 26.6–33)
MCHC RBC AUTO-ENTMCNC: 30.3 G/DL (ref 31.5–35.7)
MCV RBC AUTO: 78.9 FL (ref 79–97)
MONOCYTES # BLD AUTO: 0.43 10*3/MM3 (ref 0.1–0.9)
MONOCYTES NFR BLD AUTO: 15.4 % (ref 5–12)
NEUTROPHILS NFR BLD AUTO: 1.56 10*3/MM3 (ref 1.7–7)
NEUTROPHILS NFR BLD AUTO: 55.9 % (ref 42.7–76)
PHOSPHATE SERPL-MCNC: 3.9 MG/DL (ref 2.5–4.5)
PLATELET # BLD AUTO: 96 10*3/MM3 (ref 140–450)
POTASSIUM SERPL-SCNC: 5.7 MMOL/L (ref 3.5–5.2)
RBC # BLD AUTO: 3.6 10*6/MM3 (ref 3.77–5.28)
SODIUM SERPL-SCNC: 136 MMOL/L (ref 136–145)
WBC NRBC COR # BLD: 2.79 10*3/MM3 (ref 3.4–10.8)

## 2023-02-18 PROCEDURE — 63710000001 MYCOPHENOLATE MOFETIL PER 250 MG: Performed by: INTERNAL MEDICINE

## 2023-02-18 PROCEDURE — 99232 SBSQ HOSP IP/OBS MODERATE 35: CPT | Performed by: NURSE PRACTITIONER

## 2023-02-18 PROCEDURE — 63710000001 PREDNISONE PER 5 MG: Performed by: INTERNAL MEDICINE

## 2023-02-18 PROCEDURE — 82525 ASSAY OF COPPER: CPT | Performed by: NURSE PRACTITIONER

## 2023-02-18 PROCEDURE — 85025 COMPLETE CBC W/AUTO DIFF WBC: CPT | Performed by: INTERNAL MEDICINE

## 2023-02-18 PROCEDURE — 80069 RENAL FUNCTION PANEL: CPT | Performed by: INTERNAL MEDICINE

## 2023-02-18 RX ORDER — SIMETHICONE 80 MG
80 TABLET,CHEWABLE ORAL 4 TIMES DAILY PRN
Qty: 30 TABLET | Refills: 0 | Status: SHIPPED | OUTPATIENT
Start: 2023-02-18 | End: 2023-03-27

## 2023-02-18 RX ORDER — PREDNISONE 10 MG/1
10 TABLET ORAL DAILY
Qty: 30 TABLET | Refills: 0 | Status: SHIPPED | OUTPATIENT
Start: 2023-02-19

## 2023-02-18 RX ORDER — GABAPENTIN 100 MG/1
100 CAPSULE ORAL EVERY 12 HOURS SCHEDULED
Qty: 30 CAPSULE | Refills: 0 | Status: SHIPPED | OUTPATIENT
Start: 2023-02-18 | End: 2023-03-24

## 2023-02-18 RX ORDER — FAMOTIDINE 20 MG/1
20 TABLET, FILM COATED ORAL DAILY
Qty: 30 TABLET | Refills: 0 | Status: SHIPPED | OUTPATIENT
Start: 2023-02-19

## 2023-02-18 RX ORDER — LACOSAMIDE 50 MG/1
50 TABLET ORAL EVERY 12 HOURS SCHEDULED
Qty: 60 TABLET | Refills: 0 | Status: SHIPPED | OUTPATIENT
Start: 2023-02-18

## 2023-02-18 RX ORDER — NIFEDIPINE 60 MG/1
60 TABLET, FILM COATED, EXTENDED RELEASE ORAL 2 TIMES DAILY
Qty: 60 TABLET | Refills: 0 | Status: SHIPPED | OUTPATIENT
Start: 2023-02-18 | End: 2023-03-30

## 2023-02-18 RX ORDER — HYDRALAZINE HYDROCHLORIDE 25 MG/1
25 TABLET, FILM COATED ORAL EVERY 8 HOURS SCHEDULED
Qty: 90 TABLET | Refills: 2 | Status: SHIPPED | OUTPATIENT
Start: 2023-02-18 | End: 2023-03-27 | Stop reason: SDUPTHER

## 2023-02-18 RX ORDER — CARVEDILOL 25 MG/1
25 TABLET ORAL EVERY 12 HOURS SCHEDULED
Qty: 60 TABLET | Refills: 0 | Status: SHIPPED | OUTPATIENT
Start: 2023-02-18

## 2023-02-18 RX ORDER — VANCOMYCIN HYDROCHLORIDE 125 MG/1
125 CAPSULE ORAL EVERY 6 HOURS SCHEDULED
Qty: 26 CAPSULE | Refills: 0 | Status: SHIPPED | OUTPATIENT
Start: 2023-02-18 | End: 2023-02-25

## 2023-02-18 RX ORDER — HEPARIN SODIUM 1000 [USP'U]/ML
5000 INJECTION, SOLUTION INTRAVENOUS; SUBCUTANEOUS ONCE
Status: DISCONTINUED | OUTPATIENT
Start: 2023-02-18 | End: 2023-02-18 | Stop reason: HOSPADM

## 2023-02-18 RX ORDER — MYCOPHENOLATE MOFETIL 500 MG/1
250 TABLET ORAL EVERY 12 HOURS SCHEDULED
Qty: 30 TABLET | Refills: 0 | Status: SHIPPED | OUTPATIENT
Start: 2023-02-18

## 2023-02-18 RX ADMIN — VANCOMYCIN HYDROCHLORIDE 125 MG: 125 CAPSULE ORAL at 00:55

## 2023-02-18 RX ADMIN — NIFEDIPINE 60 MG: 60 TABLET, FILM COATED, EXTENDED RELEASE ORAL at 08:42

## 2023-02-18 RX ADMIN — VANCOMYCIN HYDROCHLORIDE 125 MG: 125 CAPSULE ORAL at 14:31

## 2023-02-18 RX ADMIN — HYDRALAZINE HYDROCHLORIDE 25 MG: 25 TABLET, FILM COATED ORAL at 08:41

## 2023-02-18 RX ADMIN — CARVEDILOL 25 MG: 25 TABLET, FILM COATED ORAL at 08:41

## 2023-02-18 RX ADMIN — VANCOMYCIN HYDROCHLORIDE 125 MG: 125 CAPSULE ORAL at 08:40

## 2023-02-18 RX ADMIN — PREDNISONE 10 MG: 10 TABLET ORAL at 08:41

## 2023-02-18 RX ADMIN — LACOSAMIDE 50 MG: 100 TABLET, FILM COATED ORAL at 08:41

## 2023-02-18 RX ADMIN — MYCOPHENOLATE MOFETIL 250 MG: 500 TABLET ORAL at 08:40

## 2023-02-18 RX ADMIN — FAMOTIDINE 20 MG: 20 TABLET, FILM COATED ORAL at 08:41

## 2023-02-18 RX ADMIN — HYDRALAZINE HYDROCHLORIDE 25 MG: 25 TABLET, FILM COATED ORAL at 14:29

## 2023-02-18 RX ADMIN — GABAPENTIN 100 MG: 100 CAPSULE ORAL at 08:41

## 2023-02-18 NOTE — PROGRESS NOTES
LOS: 5 days   Patient Care Team:  Bobby Corey MD as PCP - General (Internal Medicine)  Winnie Sanchez MD as Referring Physician (Obstetrics and Gynecology)  Norberto Almaraz MD PhD as Consulting Physician (Hematology and Oncology)  Lupis Thomas MD as Consulting Physician (Nephrology)      Subjective     Interval History: No new events overnight.  Patient is getting dialysis.  She denies any GI complaints at this time.      ROS:   Review of Systems   Constitutional: Positive for activity change and appetite change.   Neurological: Positive for weakness.   All other systems reviewed and are negative.         Medication Review:     Current Facility-Administered Medications:   •  acetaminophen (TYLENOL) tablet 650 mg, 650 mg, Oral, Q4H PRN, Laurence Cedeno MD, 650 mg at 02/13/23 2138  •  carvedilol (COREG) tablet 25 mg, 25 mg, Oral, Q12H, Eduardo Hendricks MD, 25 mg at 02/18/23 0841  •  epoetin cari-epbx (RETACRIT) injection 10,000 Units, 10,000 Units, Subcutaneous, Once per day on Mon Wed Fri, Hair Mckeon MD, 10,000 Units at 02/16/23 1542  •  famotidine (PEPCID) tablet 20 mg, 20 mg, Oral, Daily, Lakisha Hunt MD, 20 mg at 02/18/23 0841  •  gabapentin (NEURONTIN) capsule 100 mg, 100 mg, Oral, Q12H, Lakisha Hunt MD, 100 mg at 02/18/23 0841  •  heparin (porcine) injection 4,000 Units, 4,000 Units, Intracatheter, PRN, Eduardo Hendricks MD, 4,000 Units at 02/16/23 1340  •  heparin (porcine) injection 5,000 Units, 5,000 Units, Intracatheter, Once, Hair Mckeon MD  •  hydrALAZINE (APRESOLINE) injection 10 mg, 10 mg, Intravenous, Q6H PRN, Lakisha Hunt MD, 10 mg at 02/15/23 0236  •  hydrALAZINE (APRESOLINE) tablet 25 mg, 25 mg, Oral, Q8H, Regi Hyde MD, 25 mg at 02/18/23 0841  •  lacosamide (VIMPAT) tablet 50 mg, 50 mg, Oral, Q12H, Laurence Cedeno MD, 50 mg at 02/18/23 0841  •  melatonin tablet 3 mg, 3 mg, Oral, Nightly PRN, Laurence Cedeno  MD Jose, 3 mg at 02/13/23 2138  •  mycophenolate (CELLCEPT) tablet 250 mg, 250 mg, Oral, Q12H, Regi Hyde MD, 250 mg at 02/18/23 0840  •  NIFEdipine XL (PROCARDIA XL) 24 hr tablet 60 mg, 60 mg, Oral, BID, Laurence Cedeno MD, 60 mg at 02/18/23 0842  •  ondansetron (ZOFRAN) tablet 4 mg, 4 mg, Oral, Q6H PRN **OR** ondansetron (ZOFRAN) injection 4 mg, 4 mg, Intravenous, Q6H PRN, Laurence Cedeno MD, 4 mg at 02/15/23 2156  •  Pharmacy Consult, , Does not apply, Continuous PRN, Lakisha Hunt MD  •  predniSONE (DELTASONE) tablet 10 mg, 10 mg, Oral, Daily, Hair Mckeon MD, 10 mg at 02/18/23 0841  •  simethicone (MYLICON) chewable tablet 80 mg, 80 mg, Oral, 4x Daily PRN, Bisi Winn PA  •  [COMPLETED] Insert Peripheral IV, , , Once **AND** sodium chloride 0.9 % flush 10 mL, 10 mL, Intravenous, PRN, Anthony Hawthorne MD, 10 mL at 02/15/23 0841  •  vancomycin (VANCOCIN) capsule 125 mg, 125 mg, Oral, Q6H, Lakisha Hunt MD, 125 mg at 02/18/23 0840      Objective     Vital Signs  Vitals:    02/17/23 1217 02/17/23 1950 02/17/23 2340 02/18/23 0802   BP: 131/93 139/91 133/92 131/94   BP Location: Right arm Left arm Left arm Left arm   Patient Position: Lying Lying Lying Lying   Pulse: 95 88 99 89   Resp: 20 20 20 18   Temp: 98 °F (36.7 °C) 98.4 °F (36.9 °C) 98.3 °F (36.8 °C) 98.2 °F (36.8 °C)   TempSrc: Oral Oral Oral Oral   SpO2: 98% 97% 98% 96%   Weight:       Height:           Physical Exam: Resting in bed getting dialysis    General Appearance:    Awake and alert, ill appearing   Head:    Normocephalic, without obvious abnormality   Eyes:          Conjunctivae normal, anicteric sclera   Throat:   No oral lesions, no thrush, oral mucosa moist   Neck:   No adenopathy, supple, no JVD   Lungs:     Respirations regular, even and unlabored   Abdomen:     Soft, non-tender, non-distended, no rebound or guarding, no hepatosplenomegaly   Rectal:     Deferred   Extremities:   No edema, no  cyanosis, moves equally bilaterally   Skin:   No bruising, no rash, no jaundice        Results Review:    CBC  Results from last 7 days   Lab Units 02/18/23 0627 02/17/23  0424 02/16/23  0356 02/15/23  1543 02/15/23  0343 02/14/23  1253 02/14/23  0348 02/13/23  1148   RBC 10*6/mm3 3.60* 3.58* 3.44*  --  3.94  --  3.78 4.31   WBC 10*3/mm3 2.79* 2.38* 2.43*  --  1.92*  --  2.51* 2.46*   HEMOGLOBIN g/dL 8.6* 8.5* 7.9*  --  9.4*  --  9.0* 10.4*   PLATELETS 10*3/mm3 96* 78* 74* 59* 52* 31* 27* 26*       CMP  Results from last 7 days   Lab Units 02/18/23 0627 02/17/23  0424 02/16/23  0356 02/15/23  0343 02/13/23  2208 02/13/23  1148   SODIUM mmol/L 136 131* 136 135* 134* 135*   POTASSIUM mmol/L 5.7* 4.9 5.0 3.9 4.6 4.7   CHLORIDE mmol/L 100 99 103 100 96* 96*   CO2 mmol/L 25.6 25.2 26.2 27.1 30.0* 30.0*   BUN mg/dL 27* 13 20 8 17 14   CREATININE mg/dL 6.62* 4.62* 6.80* 4.63* 7.53* 6.91*   GLUCOSE mg/dL 92 120* 104* 114* 108* 89   ALBUMIN g/dL 3.7 3.6 3.5 3.6  --  3.7   BILIRUBIN mg/dL  --   --   --  0.6  --  1.2   ALK PHOS U/L  --   --   --  40  --  41   AST (SGOT) U/L  --   --   --  23  --  42*   ALT (SGPT) U/L  --   --   --  8  --  9       Amylase and Lipase  Results from last 7 days   Lab Units 02/15/23  0343 02/14/23  2355   LIPASE U/L 22 22       CRP         Imaging Results (Last 24 Hours)     ** No results found for the last 24 hours. **            ASSESSMENT:  1. Diffuse chronic abdominal pain  2. ESRD on HD, follows with [facility] for possible transplant  3. SLE  4. Hypertension  5. Diarrhea-resolved.  C. difficile toxin PCR positive, antigen negative suggestive of colonization  6. Abnormal imaging of the pancreas suggestive of pancreatitis but clinical symptoms do not match.  Serum lipase level normal at 22 on 2/15/2023.  7. CT findings of possible gastric wall thickening/gastritis  8. Splenomegaly  9. Mildly elevated AST at 42  10. Ceruloplasmin at 17      PLAN:  • Continue oral vancomycin for C. difficile per  primary team.  • Ceruloplasmin low at 17, 24 hour urine copper collection not possible as patient is anuric.   • AMA, SMA negative, GIANFRANCO positive-given patient's history of SLE, Alpha 1 antitrypsin pending  • Patient reports occasional epigastric discomfort, but nothing significant.  She does not have a history of GERD per her report.  Discussion was held with patient regarding findings on imaging of possible gastric wall thickening/possible gastritis.  Patient amenable to outpatient follow-up with GI for EGD.  • Follow up with [facility] as outpatient.         Inessa Iverson, APRN  02/18/23  13:21 EST

## 2023-02-18 NOTE — OUTREACH NOTE
Prep Survey    Flowsheet Row Responses   [facility] patient discharged from? [facility]   Is LACE score < 7 ? No   Eligibility [facility]   Date of Admission 02/13/23   Date of Discharge 02/18/23   Discharge Disposition Home or Self Care   Discharge diagnosis Hypertensive urgency   Does the patient have one of the following disease processes/diagnoses(primary or secondary)? Other   Does the patient have Home health ordered? No   Is there a DME ordered? No   Prep survey completed? Yes          Alley VALDEZ - Registered Nurse

## 2023-02-18 NOTE — DISCHARGE SUMMARY
Patient Name: Dee Herrera  : 1990  MRN: [id number]    Date of Admission: 2023  Date of Discharge:  2023  Primary Care Physician: Bobby Corey MD      Chief Complaint:   Hypertension      Discharge Diagnoses     Active Hospital Problems    Diagnosis  POA   • **Hypertensive urgency [I16.0]  Yes   • Abdominal pain [R10.9]  Yes   • Anemia, chronic disease [D63.8]  Yes   • Essential hypertension [I10]  Yes   • Severe malnutrition (HCC) [E43]  Yes   • Pericardial effusion [I31.39]  Yes   • ESRD (end stage renal disease) (HCC) [N18.6]  Yes   • Hemodialysis status (HCC) [Z99.2]  Not Applicable   • Seizure disorder (HCC) [G40.909]  Yes   • Elevated liver function tests [R79.89]  Yes   • C. difficile colitis [A04.72]  Yes   • Systemic lupus erythematosus (HCC) [M32.9]  Yes   • Pancytopenia (HCC) [D61.818]  Yes      Resolved Hospital Problems   No resolved problems to display.        Hospital Course     Ms. Herrera is a 32 y.o. female with a history of ESRD on HD, SLE, HTN who presented to [facility] initially complaining of elevated blood pressure.  Please see the admitting history and physical for further details.  She was admitted to the hospital for further evaluation and treatment.    Essential Hypertension complicated by Hypertensive urgency  Pericardial effusion  Underwent extensive workup during hospital stay, as follows: Patient last echo on 2/3/2023 revealed a normal ejection fraction, left ventricular strain, left ventricular hypertrophy, grade 1 diastolic dysfunction, left atrial enlargement, significant pericardial effusion.  Repeat 2D echo noted with no changes and persistent pericardial effusion.  No evidence of cardiac tamponade. Troponin was elevated but subsequently trended down. Also, has elevated pro-BNP. CT scan of the brain with atrophy and small vessel disease with no acute event. Chest x-ray with a small atelectasis versus effusion in the left lung base.  EKG  "with normal sinus rhythm, left atrial enlargement, left anterior fascicular block, T wave inversion in the lateral leads with ST elevation in the anterior leads (old changes). Patient without symptoms of angina and/or signs/symptoms suggestive of acute decompensated heart failure. Cardiology consulted and followed patient during her stay, per most recent note prior to hospital discharge: \"Her pericardial effusion is small to moderate and stable in appearance.  There is no indication for pericardiocentesis at this time. We will pursue further work-up of her left ventricular hypertrophy as an outpatient.\" Blood pressure elevated on admission, subsequently required adjustment to her medication regimen, which led to overall improvement during hospital stay. On day of discharge, blood pressure stable and acceptable acutely, improved from prior. Plan to continue medications as currently prescribed after discharge. Recommend that patient check her BP 2 to 3 times daily and record those values in log book and take with her to next PCP visit to allow for greater insight into treatment efficacy. Recommend low sodium/heart healthy diet. Additionally, recommend close follow up with Cardiology within 1-2 weeks or as scheduled for reassessment to guide further management decisions. Follow up with PCP within 1 week for reassessment.    End-stage renal disease  - On hemodialysis T/TH/sat.  Patient closely followed by Nephrology and received dialysis during her inpatient stay. Plan for continuation of dialysis as scheduled after discharge with close outpatient nephrology follow up to guide ongoing management decisions.      Abdominal pain secondary to C. difficile infection  - CT scan of the abdomen and pelvis reveals anasarca, and splenomegaly.  Normal liver function test..  Normal lipase. Benign GI examination.  Continue PO vancomycin as prescribed at time of discharge. Recommend close follow up with PCP and GI within 1-2 weeks or " as scheduled for reassessment to guide further management decisions.       Pancytopenia  -  WBC, hemoglobin and platelets remain low, however, relatively stable from prior on most recent labs prior to discharge. Elevated IPF. CT scan of the abdomen revealed no lymphadenopathy but positive splenomegaly.  Above could be secondary to medication/splenomegaly/bone marrow suppression. Oncology consulted and followed patient during her stay. They subsequently signed off, stating they had nothing further to add and do not plan to see routinely as outpatient, per their note.  However, based on follow up lab results after discharge, I believe further reassessment/evaluation could be warranted. Recommend repeat CBC with PCP within 1 week for reassessment. Further management based upon results of future testing, as discretion of PCP.    SLE  - Continue Plaquenil/prednisone and CellCept as prescribed. Patient reportedly has outpatient rheumatology appointment scheduled for next week, it is imperative that she go to this appointment. This was discussed with her multiple times prior to discharge and patient voiced understanding and was agreeable, stating that she will make sure that she goes to her appointment.     Hyponatremia, resolved  - Na previously low during her hospital stay, was closely monitored, however, Na within normal limits by time of discharge. Recommend repeat BMP with PCP within 1 week for reassessment.    Seizures  - Continue Vimpat as prescribed at time of discharge.    Day of Discharge     Subjective:  Patient seen and examined this morning. Hospital day 5. At time of my examination, patient is awake, resting comfortably in bed, feels better overall, no acute complaints, cleared for discharge by consultants after dialysis.     Physical Exam:  Temp:  [98.2 °F (36.8 °C)-98.4 °F (36.9 °C)] 98.2 °F (36.8 °C)  Heart Rate:  [88-99] 94  Resp:  [18-20] 18  BP: (131-139)/(91-96) 132/96  Body mass index is 18.57  kg/m².  Physical Exam  Vitals and nursing note reviewed.   Constitutional:       General: She is not in acute distress.     Appearance: She is ill-appearing.   Cardiovascular:      Rate and Rhythm: Normal rate and regular rhythm.      Pulses: Normal pulses.      Heart sounds: Normal heart sounds.   Pulmonary:      Effort: Pulmonary effort is normal. No respiratory distress.      Breath sounds: Normal breath sounds.   Abdominal:      General: Bowel sounds are normal. There is no distension.      Palpations: Abdomen is soft.      Tenderness: There is no abdominal tenderness.   Musculoskeletal:      Right lower leg: No edema.      Left lower leg: No edema.   Skin:     General: Skin is warm and dry.   Neurological:      Mental Status: She is alert.         Consultants     Consult Orders (all) (From admission, onward)     Start     Ordered    02/15/23 0850  Inpatient Cardiology Consult  Once        Specialty:  Cardiology  Provider:  Blake Varela MD    02/15/23 0850    02/14/23 1226  Hematology & Oncology Inpatient Consult  Once        Specialty:  Hematology and Oncology  Provider:  Juvenal Murillo MD    02/14/23 1231    02/13/23 2154  Inpatient Gastroenterology Consult  Once        Specialty:  Gastroenterology  Provider:  Drew Kaminski MD    02/13/23 2153    02/13/23 2153  Inpatient Nephrology Consult  Once        Specialty:  Nephrology  Provider:  Eduardo Hendricks MD    02/13/23 2153    02/13/23 1547  Inpatient Consult to Advance Care Planning  Once        Provider:  (Not yet assigned)    02/13/23 1546    02/13/23 1349  Nephrology (on -call MD unless specified)  Once        Specialty:  Nephrology  Provider:  Eduardo Hendricks MD    02/13/23 1349    02/13/23 1327  [facility] (on-call MD unless specified) Details  Once        Specialty:  Hospitalist  Provider:  Laurence Cedeno MD    02/13/23 1326              Procedures     * Surgery not found *      Imaging Results (All)     Procedure Component Value  Units Date/Time    CT Abdomen Pelvis With Contrast [[id number]] Collected: 02/14/23 1550     Updated: 02/14/23 1606    Narrative:      CT ABDOMEN PELVIS W CONTRAST-     HISTORY:  Abdominal pain/distention.      TECHNIQUE:  CT of the abdomen and pelvis was performed following the  administration of intravenous contrast. Radiation dose reduction  techniques were utilized, including automated exposure control and  exposure modulation based on body size.     COMPARISON:  CT abdomen and pelvis 07/23/2022     FINDINGS:     There is a partially imaged central line with the tip at the inferior  cavoatrial junction. The heart is enlarged. There is a new small  pericardial effusion. There are small bilateral pleural effusions with  mild adjacent atelectasis, right greater than left, similar to prior.  The liver is normal in size. A hypodense focus in the right hepatic lobe  is too small to accurately characterize but not significantly changed.  The gallbladder is decompressed. The spleen is normal in size but has  increased in size from 07/23/2022, measuring 11.5 cm in AP dimension.  There is a diffusely mottled appearance of the splenic parenchyma. The  pancreas is diffusely hypodense. The adrenal glands are within normal  limits. The kidneys are symmetrically atrophic. There is no  hydronephrosis.  There is diffuse mesenteric edema, similar to prior, with improved small  volume free fluid. There are prominent bilateral inguinal lymph nodes.  The abdominal aorta is normal in caliber.   There is diffuse gastric wall thickening and mucosal hyperenhancement.  The appendix is visualized and within normal limits. The bladder is  collapsed and process, although there is diffuse bladder wall  thickening. The uterus is present. There is no adnexal mass.  There is mild diffuse body wall edema, slightly improved.          Impression:         1.  Findings of anasarca, including small bilateral pleural effusions  with mild adjacent  atelectasis, a small pericardial effusion, diffuse  mesenteric edema and small volume free fluid, and mild diffuse body wall  edema.  2.  Diffuse gastric wall thickening and mucosal hyperenhancement,  incompletely assessed without oral contrast, which could be due to poor  distention but could also reflect a nonspecific gastritis or sequela of  the patient's overall fluid status.  3.  Diffusely hypodense pancreas. Correlate with serum lipase to  evaluate for possible pancreatitis.  4.  Increased spleen size with a diffuse mottled appearance of the  parenchyma, which cannot be compared to the prior noncontrast enhanced  examination and is nonspecific.   5.  Diffuse bladder wall thickening may be due to poor distention,  however, correlate with urinalysis if there is clinical concern for  acute cystitis.     This report was finalized on 2/14/2023 4:03 PM by Dr. Shea Celestin M.D.       CT Head Without Contrast [[id number]] Collected: 02/13/23 1324     Updated: 02/13/23 1728    Narrative:      CT OF THE BRAIN WITHOUT CONTRAST 02/13/2023     HISTORY: Headache. Hypertension.     TECHNIQUE: Axial images were obtained through the brain without  intravenous contrast.     FINDINGS:  There is mild to moderate diffuse atrophy. There is some mild  decreased attenuation of the periventricular white matter particularly  in the right superior parietal region which may represent small vessel  ischemic disease. There is no evidence of acute infarction, hemorrhage  midline shift or mass effect.     There is mild mucosal thickening in the ethmoid sinuses.       Impression:      1. Atrophy and minimal changes of small vessel ischemic disease. These  findings are somewhat advanced for a patient of this age.  2. No acute intracranial process.  3. Mild ethmoid sinusitis.  4. Findings were discussed with Dr. Hawthorne.        Radiation dose reduction techniques were utilized, including automated  exposure control and exposure modulation based  on body size.     This report was finalized on 2/13/2023 5:24 PM by Dr. Clyde Rayo M.D.       XR Chest 1 View [[id number]] Collected: 02/13/23 1216     Updated: 02/13/23 1221    Narrative:      XR CHEST 1 VW-     HISTORY: Female who is 32 years-old,  hypertension, short of breath     TECHNIQUE: Frontal view of the chest     COMPARISON: 07/26/2022     FINDINGS: Right-sided central venous catheter appears stable. The heart  is enlarged. Pulmonary vasculature is unremarkable. Obscuration of the  left hemidiaphragm suggests small left basilar  atelectasis/infiltrate/effusion is suggested. No  pneumothorax. No acute  osseous process.       Impression:      Small left basilar atelectasis/infiltrate/effusion,  follow-up suggested. Cardiomegaly.     This report was finalized on 2/13/2023 12:18 PM by Dr. Norm Arshad M.D.           Results for orders placed during the hospital encounter of 08/04/22    Duplex Venous Lower Extremity - Left CAR    Interpretation Summary  · Chronic left lower extremity superficial thrombophlebitis noted in the small saphenous.  · All other left sided veins appeared normal.    Results for orders placed during the hospital encounter of 02/13/23    Adult Transthoracic Echo Complete W/ Cont if Necessary Per Protocol    Interpretation Summary  •  Left ventricular systolic function is normal. Calculated left ventricular EF = 59.1%  •  Left ventricular wall thickness is consistent with severe concentric hypertrophy.  •  Differential diagnosis includes infiltrative cardiomyopathy, long-standing HTN and/or chronic renal insufficiency. Apical sparing on strain imaging is present, consistent with amyloid heart disease, suggest clinical correlation  •  Left ventricular diastolic function is consistent with (grade II w/high LAP) pseudonormalization.  •  There is moderate, bileaflet mitral valve thickening present.  •  There is a moderate-large circumferential pericardial effusion; there is  somewhat greater effusion posteriorly. There is no evidence of cardiac tamponade.  •  Elevated left atrial pressure.    Pertinent Labs     Results from last 7 days   Lab Units 02/18/23  0627 02/17/23  0424 02/16/23  0356 02/15/23  1543 02/15/23  0343   WBC 10*3/mm3 2.79* 2.38* 2.43*  --  1.92*   HEMOGLOBIN g/dL 8.6* 8.5* 7.9*  --  9.4*   PLATELETS 10*3/mm3 96* 78* 74* 59* 52*     Results from last 7 days   Lab Units 02/18/23  0627 02/17/23  0424 02/16/23  0356 02/15/23  0343   SODIUM mmol/L 136 131* 136 135*   POTASSIUM mmol/L 5.7* 4.9 5.0 3.9   CHLORIDE mmol/L 100 99 103 100   CO2 mmol/L 25.6 25.2 26.2 27.1   BUN mg/dL 27* 13 20 8   CREATININE mg/dL 6.62* 4.62* 6.80* 4.63*   GLUCOSE mg/dL 92 120* 104* 114*   EGFR mL/min/1.73 8.1* 12.4* 7.8* 12.4*     Results from last 7 days   Lab Units 02/18/23  0627 02/17/23  0424 02/16/23  0356 02/15/23  0343 02/13/23  1148   ALBUMIN g/dL 3.7 3.6 3.5 3.6 3.7   BILIRUBIN mg/dL  --   --   --  0.6 1.2   ALK PHOS U/L  --   --   --  40 41   AST (SGOT) U/L  --   --   --  23 42*   ALT (SGPT) U/L  --   --   --  8 9     Results from last 7 days   Lab Units 02/18/23  0627 02/17/23  0424 02/16/23  0356 02/15/23  0343   CALCIUM mg/dL 8.9 8.6 8.6 8.7   ALBUMIN g/dL 3.7 3.6 3.5 3.6   PHOSPHORUS mg/dL 3.9 3.2 4.8*  --      Results from last 7 days   Lab Units 02/15/23  0343 02/14/23  2355   LIPASE U/L 22 22     Results from last 7 days   Lab Units 02/14/23  2355 02/13/23  2208 02/13/23  1148   HSTROP T ng/L 58* 68* 66*   PROBNP pg/mL  --   --  >70,000.0*           Invalid input(s): LDLCALC          Test Results Pending at Discharge     Pending Labs     Order Current Status    Alpha - 1 - Antitrypsin Phenotype In process    Copper, Serum In process          Discharge Details        Discharge Medications      New Medications      Instructions Start Date   famotidine 20 MG tablet  Commonly known as: PEPCID   20 mg, Oral, Daily   Start Date: February 19, 2023     gabapentin 100 MG  capsule  Commonly known as: NEURONTIN   100 mg, Oral, Every 12 Hours Scheduled      hydrALAZINE 25 MG tablet  Commonly known as: APRESOLINE   25 mg, Oral, Every 8 Hours Scheduled      simethicone 80 MG chewable tablet  Commonly known as: MYLICON   80 mg, Oral, 4 Times Daily PRN      vancomycin 125 MG capsule  Commonly known as: VANCOCIN   125 mg, Oral, Every 6 Hours Scheduled         Changes to Medications      Instructions Start Date   carvedilol 25 MG tablet  Commonly known as: COREG  What changed: when to take this   25 mg, Oral, Every 12 Hours Scheduled      lacosamide 50 MG tablet tablet  Commonly known as: VIMPAT  What changed:   how to take this  when to take this   50 mg, Oral, Every 12 Hours Scheduled      mycophenolate 500 MG tablet  Commonly known as: CELLCEPT  What changed:   how much to take  how to take this  when to take this   250 mg, Oral, Every 12 Hours Scheduled      NIFEdipine CC 30 MG 24 hr tablet  Commonly known as: ADALAT CC  What changed: Another medication with the same name was changed. Make sure you understand how and when to take each.   Oral      NIFEdipine CC 60 MG 24 hr tablet  Commonly known as: ADALAT CC  What changed:   how to take this  when to take this   60 mg, Oral, 2 Times Daily      predniSONE 10 MG tablet  Commonly known as: DELTASONE  What changed: how much to take   10 mg, Oral, Daily   Start Date: February 19, 2023        Continue These Medications      Instructions Start Date   hydroxychloroquine 200 MG tablet  Commonly known as: PLAQUENIL   200 mg      MIRCERA IJ   225 mcg, Every 14 Days      ondansetron ODT 4 MG disintegrating tablet  Commonly known as: ZOFRAN-ODT   4 mg         Stop These Medications    omeprazole 40 MG capsule  Commonly known as: priLOSEC            Allergies   Allergen Reactions   • Minoxidil Other (See Comments)     Pericardial effusion .       Discharge Disposition:  Home or Self Care      Discharge Diet:  Diet Order   Procedures   • Diet: Cardiac  Diets; Healthy Heart (2-3 Na+); Texture: Regular Texture (IDDSI 7); Fluid Consistency: Thin (IDDSI 0)       Discharge Activity:   Activity Instructions     Activity as Tolerated            CODE STATUS:    Code Status and Medical Interventions:   Ordered at: 02/13/23 1812     Code Status (Patient has no pulse and is not breathing):    CPR (Attempt to Resuscitate)     Medical Interventions (Patient has pulse or is breathing):    Full       Future Appointments   Date Time Provider Department Center   3/2/2023  7:00 AM [facility]   4/5/2023  3:15 PM Drew Kaminski MD [facility]     Additional Instructions for the Follow-ups that You Need to Schedule     Discharge Follow-up with PCP   As directed       Currently Documented PCP:    Bobby Corey MD    PCP Phone Number:    [phone]     Follow Up Details: Within 1 week after discharge for reassessment, medication review, symptom review, repeat BMP and CBC         Discharge Follow-up with Specialty: Rheumatology, nephrology, gastroenterology, hematology and cardiology   As directed      Specialty: Rheumatology, nephrology, gastroenterology, hematology and cardiology    Follow Up Details: Please ensure that you follow-up with these specialties within 1 to 2 weeks after discharge or as scheduled.  If you do not have an appointment, please call their office to ensure that one is made.            Follow-up Information     Bobby Corey MD. Schedule an appointment as soon as possible for a visit in 1 week(s).    Specialty: Internal Medicine  Contact information:  [address]  [facility] [address]  [place] [address]  [phone]             Eduardo Hendricks MD. Schedule an appointment as soon as possible for a visit in 1 week(s).    Specialty: Nephrology  Contact information:  [address]  [facility] [address]  [place] [address]  [phone]             Drew Kaminski MD. Schedule an appointment as soon as possible for a visit in 2 week(s).    Specialty:  Gastroenterology  Contact information:  [address]  [facility] [address]  [facility] [address]  [phone]             Bobby Corey MD .    Specialty: Internal Medicine  Why: Within 1 week after discharge for reassessment, medication review, symptom review, repeat BMP and CBC  Contact information:  [address]  [facility] [address]  [facility] [address]  [phone]                         Additional Instructions for the Follow-ups that You Need to Schedule     Discharge Follow-up with PCP   As directed       Currently Documented PCP:    Bobby Corey MD    PCP Phone Number:    [phone]     Follow Up Details: Within 1 week after discharge for reassessment, medication review, symptom review, repeat BMP and CBC         Discharge Follow-up with Specialty: Rheumatology, nephrology, gastroenterology, hematology and cardiology   As directed      Specialty: Rheumatology, nephrology, gastroenterology, hematology and cardiology    Follow Up Details: Please ensure that you follow-up with these specialties within 1 to 2 weeks after discharge or as scheduled.  If you do not have an appointment, please call their office to ensure that one is made.           Time Spent on Discharge:  Greater than 30 minutes      Hernan Corcoran DO  [facility]  02/18/23  15:58 EST

## 2023-02-18 NOTE — NURSING NOTE
S/w Dr Diaz -- okay to be discharged, keep medication adjustments     Pt confirmed follow up with Dr Db Padgett at 3pm on 2/22/23     Dr Corcoran notified.

## 2023-02-18 NOTE — PROGRESS NOTES
.      [facility]      Patient Name: Dee Herrera  : 1990  MRN: [id number]  Primary Care Physician:  Bobby Corey MD  Date of admission: 2023    Subjective     Interval History:     Patient feeling better  Improving appetite  No nausea or emesis  Having regular bowel movements    She underwent hemodialysis today with 2.5 L of fluid removal    Review of Systems:   As noted above    Objective     Vitals:   Temp:  [98.2 °F (36.8 °C)-98.4 °F (36.9 °C)] 98.2 °F (36.8 °C)  Heart Rate:  [88-99] 94  Resp:  [18-20] 18  BP: (131-139)/(91-96) 132/96    Intake/Output Summary (Last 24 hours) at 2023 1438  Last data filed at 2023 1351  Gross per 24 hour   Intake 390 ml   Output 2500 ml   Net -2110 ml       Physical Exam:    General Appearance: Chronically ill and deconditioned  Skin: warm and dry  HEENT: oral mucosa normal, nonicteric sclera  Neck: supple, no JVD.  Tunneled hemodialysis catheter in place in right upper chest  Lungs: CTA  Heart: RRR, normal S1 and S2 with systolic ejection murmur  Abdomen: soft, nontender, nondistended  : no palpable bladder  Extremities: no edema, cyanosis or clubbing  Neuro: normal speech and mental status     Scheduled Meds:     carvedilol, 25 mg, Oral, Q12H  epoetin cari/cari-epbx, 10,000 Units, Subcutaneous, Once per day on   famotidine, 20 mg, Oral, Daily  gabapentin, 100 mg, Oral, Q12H  heparin (porcine), 5,000 Units, Intracatheter, Once  hydrALAZINE, 25 mg, Oral, Q8H  lacosamide, 50 mg, Oral, Q12H  mycophenolate, 250 mg, Oral, Q12H  NIFEdipine XL, 60 mg, Oral, BID  predniSONE, 10 mg, Oral, Daily  vancomycin, 125 mg, Oral, Q6H      IV Meds:   Pharmacy Consult,         Results Reviewed:   I have personally reviewed the results from the time of this admission to 2023 14:38 EST     Results from last 7 days   Lab Units 23  0627 23  0424 23  0356 02/15/23  0343 23  2208 23  1148    SODIUM mmol/L 136 131* 136 135*   < > 135*   POTASSIUM mmol/L 5.7* 4.9 5.0 3.9   < > 4.7   CHLORIDE mmol/L 100 99 103 100   < > 96*   CO2 mmol/L 25.6 25.2 26.2 27.1   < > 30.0*   BUN mg/dL 27* 13 20 8   < > 14   CREATININE mg/dL 6.62* 4.62* 6.80* 4.63*   < > 6.91*   CALCIUM mg/dL 8.9 8.6 8.6 8.7   < > 9.1   BILIRUBIN mg/dL  --   --   --  0.6  --  1.2   ALK PHOS U/L  --   --   --  40  --  41   ALT (SGPT) U/L  --   --   --  8  --  9   AST (SGOT) U/L  --   --   --  23  --  42*   GLUCOSE mg/dL 92 120* 104* 114*   < > 89    < > = values in this interval not displayed.       Estimated Creatinine Clearance: 9.9 mL/min (A) (by C-G formula based on SCr of 6.62 mg/dL (H)).    Results from last 7 days   Lab Units 02/18/23  0627 02/17/23  0424 02/16/23  0356   PHOSPHORUS mg/dL 3.9 3.2 4.8*             Results from last 7 days   Lab Units 02/18/23  0627 02/17/23  0424 02/16/23  0356 02/15/23  1543 02/15/23  0343 02/14/23  1253 02/14/23  0348   WBC 10*3/mm3 2.79* 2.38* 2.43*  --  1.92*  --  2.51*   HEMOGLOBIN g/dL 8.6* 8.5* 7.9*  --  9.4*  --  9.0*   PLATELETS 10*3/mm3 96* 78* 74* 59* 52*   < > 27*    < > = values in this interval not displayed.             Assessment / Plan     ASSESSMENT:       -End Stage Renal Disease ( ESRD ) on Hemodialysis .s/p  RIJ TDC  functional .Continue to arrange hemodialysis treatment during his admission. Continue renal diet   -Chronic normocytic anemia. On Epogen protocol.10.000 SC TIW  We will continue to follow H&H closely   -Pancytopenia .  Followed by hematology appreciate input   -Hyperphosphatemia.  Continue renal diet. We will follow phosphorus to make further binders  adjustments if needed  -Hypertension from ESRD .  Continue current regimen carvedilol, hydralazine, nifedipine. We will continue to follow closely.   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  Patient was seen by  rheumatology in January 2023  -Pancytopenia as per hematology  -Moderate Pericardial effusion.  Avoiding minoxidil  -C. difficile on vancomycin oral every 6 hours  as per GI     PLAN:    -Patient underwent hemodialysis today without any complication and fluid removal of 2.5 L  -Upon discharge patient can resume Tuesday, Thursday and Saturday dialysis as an outpatient  -Avoid minoxidil upon discharge      Thank you for involving us in the care of Dee Herrera.  Please feel free to call with any questions.    Hair Mckeon MD  02/18/23  14:38 EST    [facility]  [phone]    Please note that portions of this note were completed with a voice recognition program.

## 2023-02-18 NOTE — NURSING NOTE
PRE HD VITAL SIGNS   T 98  P 94   R 20  /94    POST HD VITAL SIGNS   T 98.2  P 90  R 16  /65    UF: 2.5L

## 2023-02-18 NOTE — PLAN OF CARE
Problem: Adult Inpatient Plan of Care  Goal: Plan of Care Review  Outcome: Met  Flowsheets (Taken 2/18/2023 1601)  Progress: improving  Plan of Care Reviewed With: patient  Outcome Evaluation: Vitals stable. dialysis done today. pt discharged to home. Pt education provided. pt highly encouraged to go to follow ups. Instructions provided.

## 2023-02-18 NOTE — NURSING NOTE
VSS. A&O x 4 . Pt had BM. Pt going to  hemodialysis tomorrow. Denies  abd pain,fever or chills. Contact precaution maintained.Pt have generalized weakness.No complaints or concerns per pt. Continue to monitor.

## 2023-02-18 NOTE — PROGRESS NOTES
Name: Dee Herrera ADMIT: 2023   : 1990  PCP: Bobby Corey MD    MRN: [id number] LOS: 4 days   AGE/SEX: 32 y.o. female  ROOM: [facility]/     Subjective   Subjective   Patient seen and examined this morning. Hospital day 4. At time of my examination, patient is awake, resting comfortably in bed, feels better overall, is asking about going home.No acute events overnight per nursing.     Review of Systems   Constitutional: Negative for chills and fever.   Respiratory: Negative for shortness of breath.    Cardiovascular: Negative for chest pain.   Gastrointestinal: Negative for abdominal pain.        Objective   Objective   Vital Signs  Temp:  [98 °F (36.7 °C)-98.9 °F (37.2 °C)] 98.4 °F (36.9 °C)  Heart Rate:  [] 88  Resp:  [18-20] 20  BP: (122-139)/() 139/91  SpO2:  [96 %-98 %] 97 %  on   ;   Device (Oxygen Therapy): room air  Body mass index is 18.57 kg/m².  Physical Exam  Vitals and nursing note reviewed.   Constitutional:       General: She is not in acute distress.     Appearance: She is ill-appearing.   Cardiovascular:      Rate and Rhythm: Normal rate and regular rhythm.      Pulses: Normal pulses.      Heart sounds: Normal heart sounds.   Pulmonary:      Effort: Pulmonary effort is normal. No respiratory distress.      Breath sounds: Normal breath sounds.   Abdominal:      General: Bowel sounds are normal.      Palpations: Abdomen is soft.      Tenderness: There is no abdominal tenderness.   Musculoskeletal:      Right lower leg: No edema.      Left lower leg: No edema.   Skin:     General: Skin is warm and dry.   Neurological:      Mental Status: She is alert.       Results Review:  I reviewed the patient's new clinical results.  I reviewed the patient's imaging results and agree with the interpretation.  I reviewed the patient's other test results and agree with the interpretation  I personally viewed and interpreted the patient's EKG/Telemetry data    Results from last 7 days    Lab Units 02/17/23 0424 02/16/23 0356 02/15/23  1543 02/15/23  0343 02/14/23  1253 02/14/23 0348   WBC 10*3/mm3 2.38* 2.43*  --  1.92*  --  2.51*   HEMOGLOBIN g/dL 8.5* 7.9*  --  9.4*  --  9.0*   PLATELETS 10*3/mm3 78* 74* 59* 52*   < > 27*    < > = values in this interval not displayed.     Results from last 7 days   Lab Units 02/17/23  0424 02/16/23  0356 02/15/23  0343 02/13/23  2208   SODIUM mmol/L 131* 136 135* 134*   POTASSIUM mmol/L 4.9 5.0 3.9 4.6   CHLORIDE mmol/L 99 103 100 96*   CO2 mmol/L 25.2 26.2 27.1 30.0*   BUN mg/dL 13 20 8 17   CREATININE mg/dL 4.62* 6.80* 4.63* 7.53*   GLUCOSE mg/dL 120* 104* 114* 108*   EGFR mL/min/1.73 12.4* 7.8* 12.4* 6.9*     Results from last 7 days   Lab Units 02/17/23  0424 02/16/23  0356 02/15/23  0343 02/13/23  1148   ALBUMIN g/dL 3.6 3.5 3.6 3.7   BILIRUBIN mg/dL  --   --  0.6 1.2   ALK PHOS U/L  --   --  40 41   AST (SGOT) U/L  --   --  23 42*   ALT (SGPT) U/L  --   --  8 9     Results from last 7 days   Lab Units 02/17/23  0424 02/16/23  0356 02/15/23  0343 02/13/23  2208 02/13/23  1148   CALCIUM mg/dL 8.6 8.6 8.7 8.7 9.1   ALBUMIN g/dL 3.6 3.5 3.6  --  3.7   PHOSPHORUS mg/dL 3.2 4.8*  --   --   --        Glucose   Date/Time Value Ref Range Status   02/15/2023 0958 131 (H) 70 - 130 mg/dL Final     Comment:     Meter: [id number] : [id number] Melo DUNHAM RN       No radiology results for the last day  I have personally reviewed all medications:  Scheduled Medications  carvedilol, 25 mg, Oral, Q12H  epoetin cari/cari-epbx, 10,000 Units, Subcutaneous, Once per day on Mon Wed Fri  famotidine, 20 mg, Oral, Daily  gabapentin, 100 mg, Oral, Q12H  hydrALAZINE, 25 mg, Oral, Q8H  lacosamide, 50 mg, Oral, Q12H  mycophenolate, 250 mg, Oral, Q12H  NIFEdipine XL, 60 mg, Oral, BID  predniSONE, 10 mg, Oral, Daily  vancomycin, 125 mg, Oral, Q6H    Infusions  Pharmacy Consult,     Diet  Diet: Cardiac Diets; Healthy Heart (2-3 Na+); Texture: Regular Texture (IDDSI 7); Fluid  "Consistency: Thin (IDDSI 0)       Assessment/Plan     Active Hospital Problems    Diagnosis  POA   • **Hypertensive urgency [I16.0]  Yes   • Pericardial effusion [I31.39]  Yes   • ESRD (end stage renal disease) (formerly [facility]) [N18.6]  Yes   • Hemodialysis status (formerly [facility]) [Z99.2]  Not Applicable   • Seizure disorder (formerly [facility]) [G40.909]  Yes   • Elevated liver function tests [R79.89]  Yes   • C. difficile colitis [A04.72]  Yes   • Systemic lupus erythematosus (HCC) [M32.9]  Yes   • Pancytopenia (formerly [facility]) [D61.818]  Yes      Resolved Hospital Problems   No resolved problems to display.       32 y.o. female admitted with Hypertensive urgency.    Essential Hypertension complicated by Hypertensive urgency  Pericardial effusion  - Patient last echo on 2/3/2023 revealed a normal ejection fraction, left ventricular strain, left ventricular hypertrophy, grade 1 diastolic dysfunction, left atrial enlargement, significant pericardial effusion.  Repeat 2D echo noted with no changes and persistent pericardial effusion which could be secondary to her renal failure or lupus.  No evidence of cardiac tamponade.  Troponin is elevated but declining.  Elevated proBNP.  CT scan of the brain with atrophy and small vessel disease with no acute event.  Chest x-ray with a small atelectasis versus effusion in the left lung base.  EKG with normal sinus rhythm, left atrial enlargement, left anterior fascicular block, T wave inversion in the lateral leads with ST elevation in the anterior leads (old changes).  There is no evidence of angina or congestive heart failure.    - Cardiology consulted and evaluated patient. Per most recent note: \"Her pericardial effusion is small to moderate and stable in appearance.  There is no indication for pericardiocentesis at this time. We will pursue further work-up of her left ventricular hypertrophy as an outpatient.\" Greatly appreciate their help.  - Will continue Coreg/Adalat.  She did not have to start Cardene drip.  Continue " as needed hydralazine IV.  Minoxidil d/c per Nephrology.  - Will monitor blood pressure closely.  Elevation of the proBNP and troponin is most likely secondary to renal failure.    End-stage renal disease  - On hemodialysis T/TH/sat.  Patient appears to be euvolemic.    - Nephrology is following. Discussed with them today (02/17), planning for HD in AM prior to discharge with plans for ongoing outpatient follow up/care thereafter. Will continue to follow their plans/recommendations. Greatly appreciate their help.      Abdominal pain secondary to C. difficile infection  - CT scan of the abdomen and pelvis reveals anasarca, and splenomegaly.  Normal liver function test..  Normal lipase. Benign GI examination.  GI checking biochemical markers of the liver.    - Continue PO vancomycin as prescribed.    - Follow up GI plans/recommendations, appreciate their help.    Pancytopenia  -  WBC, hemoglobin and platelets remain low, however, relatively stable from prior. Elevated IPF.  CT scan of the abdomen revealed no lymphadenopathy but positive splenomegaly.  Above could be secondary to medication/splenomegaly/bone marrow suppression.   - Oncology consulted and following. They say nothing further to add so they signed off and do not plan to see routinely as outpatient, per their note.   - Order repeat CBC in AM for reassessment.    Hyponatremia  - Na low at 131, decreased from prior. Will monitor for now. Order AM BMP for reassessment to guide further management decisions.    SLE  - Continue Plaquenil/prednisone and CellCept as prescribed.  - Patient reportedly has outpatient rheumatology appointment scheduled for next week, it is imperative that she go to this appointment. Will discuss with her and care team.     Seizures  - Continue Vimpat as prescribed.    · SCDs for DVT prophylaxis.  · Full code.  · Discussed with patient, nursing staff, CCP and care team on multidisciplinary rounds.  · Anticipate discharge home when  cleared by consultants.      Hernan Corcoran [facility]  02/17/23

## 2023-02-19 NOTE — PAYOR COMM NOTE
"Luz Elena Herrera (32 y.o. Female)     DC SUMMARY FOR [id number]        Date of Birth   1990    Social Security Number       Address   [address] [place] [address]    Home Phone   [phone]    MRN   [id number]       Alevism   [employer]reg[employer]    Marital Status   Single                            Admission Date   2/13/23    Admission Type   Emergency    Admitting Provider   Laurence Cedeno MD    Attending Provider       Department, Room/Bed   [address], [address]457/1       Discharge Date   2/18/2023    Discharge Disposition   Home or Self Care    Discharge Destination                               Attending Provider: (none)   Allergies: Minoxidil    Isolation: None   Infection: C.difficile (02/14/23)   Code Status: Prior    Ht: 165.1 cm (65\")   Wt: 50.6 kg (111 lb 9.6 oz)    Admission Cmt: None   Principal Problem: Hypertensive urgency [I16.0]                 Active Insurance as of 2/13/2023     Primary Coverage     Payor Plan Insurance Group Employer/Plan Group    [facility] MEDICAID [facility] MEDICAID [id number]     Payor Plan Address Payor Plan Phone Number Payor Plan Fax Number Effective Dates    [address] [address] [phone]  11/1/2022 - None Entered    [facility] [address]       Subscriber Name Subscriber Birth Date Member ID       LUZ ELENA HERRERA 1990 [id number]                 Emergency Contacts      (Rel.) Home Phone Work Phone Mobile Phone    HERRERAPAULINE NICOLAS (Grandparent) -- -- [phone]    HERRERADANYELL NICOLAS (Mother) [phone] -- --            Discharge Summary    No notes of this type exist for this encounter.       DC TO HOME 2/18/23  "

## 2023-02-20 ENCOUNTER — TRANSITIONAL CARE MANAGEMENT TELEPHONE ENCOUNTER (OUTPATIENT)
Dept: CALL CENTER | Facility: HOSPITAL | Age: 33
End: 2023-02-20
Payer: MEDICAID

## 2023-02-20 NOTE — OUTREACH NOTE
Call Center TCM Note    Flowsheet Row Responses   [facility] patient discharged from? [place]   Does the patient have one of the following disease processes/diagnoses(primary or secondary)? Other   TCM attempt successful? Yes   Call start time 0848   Call end time 0853   Discharge diagnosis Hypertensive urgency   Person spoke with today (if not patient) and relationship pt   Meds reviewed with patient/caregiver? Yes   Is the patient having any side effects they believe may be caused by any medication additions or changes? No   Does the patient have all medications ordered at discharge? Yes   Is the patient taking all medications as directed (includes completed medication regime)? Yes   Comments Advised to make fu appts with Nephrology, GI, Oncology/Hemat   Does the patient have an appointment with their PCP within 7 days of discharge? Yes  [2/23/23 at 11:15 AM]   Psychosocial issues? No   Did the patient receive a copy of their discharge instructions? Yes   Nursing interventions Reviewed instructions with patient   What is the patient's perception of their health status since discharge? Improving   Is the patient/caregiver able to teach back signs and symptoms related to disease process for when to call PCP? Yes   Is the patient/caregiver able to teach back signs and symptoms related to disease process for when to call 911? Yes   Is the patient/caregiver able to teach back the hierarchy of who to call/visit for symptoms/problems? PCP, Specialist, Home health nurse, Urgent Care, ED, 911 Yes   If the patient is a current smoker, are they able to teach back resources for cessation? Not a smoker   TCM call completed? Yes   Wrap up additional comments Pt states she is doing ok. Very short call. Reviewed AVS/medications with pt. Pt verified [facility] hospital fu appt on 2/23/23, and advised to make fu appts with Nephrology, GI, Oncology/Hematology. Pt verbalized understanding.   Call end time 0853   Would this patient  benefit from a Referral to [facility] Social Work? No   Is the patient interested in additional calls from an ambulatory ?  NOTE:  applies to high risk patients requiring additional follow-up. No          Esther Lacy RN    2/20/2023, 08:56 EST

## 2023-02-20 NOTE — PROGRESS NOTES
Discharge Planning Assessment  [facility]     Patient Name: Dee Herrera  MRN: [id number]  Today's Date: 2/20/2023    Admit Date: 2/13/2023    Plan: HOme   Discharge Needs Assessment    No documentation.                Discharge Plan     Row Name 02/20/23 1024       Plan    Plan HOme    Final Discharge Disposition Code 01 - home or self-care    Final Note Home              Continued Care and Services - Discharged on 2/18/2023 Admission date: 2/13/2023 - Discharge disposition: Home or Self Care   Coordination has not been started for this encounter.       Expected Discharge Date and Time     Expected Discharge Date Expected Discharge Time    Feb 18, 2023          Demographic Summary    No documentation.                Functional Status    No documentation.                Psychosocial    No documentation.                Abuse/Neglect    No documentation.                Legal    No documentation.                Substance Abuse    No documentation.                Patient Forms    No documentation.                   Paige Messer RN

## 2023-02-21 LAB
A1AT PHENOTYP SERPL IFE: NORMAL
A1AT SERPL-MCNC: 163 MG/DL (ref 100–188)
COPPER SERPL-MCNC: 79 UG/DL (ref 80–158)

## 2023-02-27 ENCOUNTER — HOSPITAL ENCOUNTER (OUTPATIENT)
Dept: CARDIOLOGY | Facility: HOSPITAL | Age: 33
Discharge: HOME | End: 2023-02-27
Admitting: INTERNAL MEDICINE
Payer: MEDICAID

## 2023-02-27 DIAGNOSIS — M32.9 SYSTEMIC LUPUS ERYTHEMATOSUS, UNSPECIFIED SLE TYPE, UNSPECIFIED ORGAN INVOLVEMENT STATUS: ICD-10-CM

## 2023-02-27 DIAGNOSIS — R11.2 NAUSEA AND VOMITING, UNSPECIFIED VOMITING TYPE: ICD-10-CM

## 2023-02-27 LAB
BH CV VAS SMA AORTA EDV: 21.6 CM/S
BH CV VAS SMA AORTA PSV: 80.9 CM/S
BH CV VAS SMA CELIAC DIST EDV: 21.3 CM/S
BH CV VAS SMA CELIAC DIST PSV: 89.1 CM/S
BH CV VAS SMA CELIAC ORIGIN EDV: 35.2 CM/S
BH CV VAS SMA CELIAC ORIGIN PSV: 103 CM/S
BH CV VAS SMA CELIAC PROX EDV: 28.9 CM/S
BH CV VAS SMA CELIAC PROX PSV: 90.7 CM/S
BH CV VAS SMA HEPATIC EDV: 31 CM/S
BH CV VAS SMA HEPATIC PSV: 156 CM/S
BH CV VAS SMA IMA EDV: 21.4 CM/S
BH CV VAS SMA IMA PSV: 181 CM/S
BH CV VAS SMA ORIGIN EDV: 17.4 CM/S
BH CV VAS SMA ORIGIN PSV: 115 CM/S
BH CV VAS SMA SMA DIST EDV: 13.9 CM/S
BH CV VAS SMA SMA DIST PSV: 103 CM/S
BH CV VAS SMA SMA MID EDV: 16.9 CM/S
BH CV VAS SMA SMA MID PSV: 112 CM/S
BH CV VAS SMA SMA PROX EDV: 23.1 CM/S
BH CV VAS SMA SMA PROX PSV: 127 CM/S
BH CV VAS SMA SPLENIC EDV: 42.2 CM/S
BH CV VAS SMA SPLENIC PSV: 139 CM/S
MAXIMAL PREDICTED HEART RATE: 190 BPM
STRESS TARGET HR: 162 BPM

## 2023-02-27 PROCEDURE — 93975 VASCULAR STUDY: CPT

## 2023-03-01 ENCOUNTER — READMISSION MANAGEMENT (OUTPATIENT)
Dept: CALL CENTER | Facility: HOSPITAL | Age: 33
End: 2023-03-01
Payer: MEDICAID

## 2023-03-01 NOTE — OUTREACH NOTE
Medical Week 2 Survey    Flowsheet Row Responses   [facility] patient discharged from? [place]   Does the patient have one of the following disease processes/diagnoses(primary or secondary)? Other   Week 2 attempt successful? Yes   Call start time 1232   Discharge diagnosis Hypertensive urgency   Revoke Readmitted   Call end time 1232          Melissa COLES - Registered Nurse

## 2023-03-02 ENCOUNTER — HOSPITAL ENCOUNTER (OUTPATIENT)
Dept: ULTRASOUND IMAGING | Facility: HOSPITAL | Age: 33
Discharge: HOME | End: 2023-03-02
Payer: MEDICAID

## 2023-03-24 ENCOUNTER — OFFICE VISIT (OUTPATIENT)
Dept: SURGERY | Facility: CLINIC | Age: 33
End: 2023-03-24
Payer: MEDICAID

## 2023-03-24 VITALS — WEIGHT: 118 LBS | BODY MASS INDEX: 19.66 KG/M2 | HEIGHT: 65 IN

## 2023-03-24 DIAGNOSIS — Z99.2 ESRD ON HEMODIALYSIS: Primary | ICD-10-CM

## 2023-03-24 DIAGNOSIS — N18.6 ESRD ON HEMODIALYSIS: Primary | ICD-10-CM

## 2023-03-24 PROCEDURE — 99203 OFFICE O/P NEW LOW 30 MIN: CPT | Performed by: SURGERY

## 2023-03-24 PROCEDURE — 1160F RVW MEDS BY RX/DR IN RCRD: CPT | Performed by: SURGERY

## 2023-03-24 PROCEDURE — 1159F MED LIST DOCD IN RCRD: CPT | Performed by: SURGERY

## 2023-03-24 RX ORDER — HYDRALAZINE HYDROCHLORIDE 25 MG/1
1 TABLET, FILM COATED ORAL 3 TIMES DAILY
COMMUNITY
Start: 2023-02-18

## 2023-03-24 RX ORDER — LOSARTAN POTASSIUM 50 MG/1
50 TABLET ORAL
COMMUNITY
Start: 2023-03-04 | End: 2023-03-27

## 2023-03-24 NOTE — H&P (VIEW-ONLY)
Chief Complaint   Patient presents with   • PD Cath Consult       Subjective      Dee Herrera is a 32 y.o. female who is referred by Hair Mckeon MD to be evaluated for peritoneal dialysis catheter placement. Patient has ESRD secondary to lupus and  is on dialysis. She gets dialysis on Tuesday, Thursday and Saturday since 2023 . Patient does not have a AV access.  Patient has not had a recent intraabdominal infection. The patient reports having REGULAR bowel movements.  Patient did not have a Colonoscopy.     Home Evaluation: yes    Past Medical History:   Diagnosis Date   • History of anemia    • History of transfusion    • Hypertension    • Iron deficiency anemia 09/27/2021   • Lupus (systemic lupus erythematosus) (HCC) 07/30/2022   • Other specified nutritional anemias    • Seizures (HCC)    • Vitamin D deficiency 09/27/2021       Past Surgical History:   Procedure Laterality Date   • INSERTION HEMODIALYSIS CATHETER N/A 07/26/2022    Procedure: RIGHT TUNNELED DIALYSIS CATHETER PLACEMENT;  Surgeon: Diandra Adhikari MD;  Location: [facility];  Service: Vascular;  Laterality: N/A;   • NO PAST ABDOMINAL SURGERIES     • TONSILLECTOMY           Current Outpatient Medications:   •  carvedilol (COREG) 25 MG tablet, Take 1 tablet by mouth Every 12 (Twelve) Hours., Disp: 60 tablet, Rfl: 0  •  famotidine (PEPCID) 20 MG tablet, Take 1 tablet by mouth Daily., Disp: 30 tablet, Rfl: 0  •  hydrALAZINE (APRESOLINE) 25 MG tablet, Take 1 tablet by mouth 3 (Three) Times a Day., Disp: , Rfl:   •  hydroxychloroquine (PLAQUENIL) 200 MG tablet, 1 tablet., Disp: , Rfl:   •  lacosamide (VIMPAT) 50 MG tablet tablet, Take 1 tablet by mouth Every 12 (Twelve) Hours., Disp: 60 tablet, Rfl: 0  •  losartan (COZAAR) 50 MG tablet, 1 tablet., Disp: , Rfl:   •  Methoxy PEG-Epoetin Beta (MIRCERA IJ), 225 mcg Every 14 (Fourteen) Days., Disp: , Rfl:   •  mycophenolate (CELLCEPT) 500 MG tablet, Take 0.5 tablets by mouth Every 12  (Twelve) Hours., Disp: 30 tablet, Rfl: 0  •  NIFEdipine CC (ADALAT CC) 30 MG 24 hr tablet, Take  by mouth., Disp: , Rfl:   •  NIFEdipine CC (ADALAT CC) 60 MG 24 hr tablet, Take 1 tablet by mouth 2 (Two) Times a Day., Disp: 60 tablet, Rfl: 0  •  predniSONE (DELTASONE) 10 MG tablet, Take 1 tablet by mouth Daily., Disp: 30 tablet, Rfl: 0  •  simethicone (MYLICON) 80 MG chewable tablet, Chew 1 tablet 4 (Four) Times a Day As Needed for Flatulence., Disp: 30 tablet, Rfl: 0  •  hydrALAZINE (APRESOLINE) 25 MG tablet, Take 1 tablet by mouth Every 8 (Eight) Hours for 30 days., Disp: 90 tablet, Rfl: 2    Allergies   Allergen Reactions   • Minoxidil Other (See Comments)     Pericardial effusion .       Family History   Problem Relation Age of Onset   • Autoimmune disease Mother    • Anemia Mother    • Diabetes Sister    • Anemia Brother    • Diabetes Maternal Grandmother    • Hypertension Maternal Grandmother    • Cancer Maternal Grandmother    • Sickle cell anemia Cousin    • Malig Hyperthermia Neg Hx        Social History     Socioeconomic History   • Marital status: Single   Tobacco Use   • Smoking status: Former     Types: Cigars   • Smokeless tobacco: Never   • Tobacco comments:     Patient smoked black & mild   Vaping Use   • Vaping Use: Never used   Substance and Sexual Activity   • Alcohol use: Yes     Comment: social   • Drug use: Yes     Types: Marijuana   • Sexual activity: Not Currently     Partners: Male     Birth control/protection: None     REVIEW OF SYSTEMS    Review of Systems   Constitutional: Negative for activity change, fatigue and fever.   HENT: Negative for congestion and dental problem.    Eyes: Negative for discharge and itching.   Respiratory: Negative for apnea, cough and choking.    Cardiovascular: Positive for leg swelling. Negative for chest pain.   Gastrointestinal: Negative for abdominal distention and abdominal pain.   Endocrine: Negative for cold intolerance and heat intolerance.  "  Genitourinary: Negative for difficulty urinating, frequency and genital sores.   Musculoskeletal: Positive for arthralgias. Negative for back pain and gait problem.   Allergic/Immunologic: Negative for environmental allergies and food allergies.   Neurological: Negative for dizziness and facial asymmetry.   Hematological: Negative for adenopathy. Does not bruise/bleed easily.   Psychiatric/Behavioral: Negative for agitation. The patient is not nervous/anxious.        Physical Examination  Ht 165.1 cm (65\")   Wt 53.5 kg (118 lb)   BMI 19.64 kg/m²   Body mass index is 19.64 kg/m².  Physical Exam  Constitutional:       Appearance: She is normal weight.   HENT:      Head: Normocephalic and atraumatic.      Nose: Nose normal.      Mouth/Throat:      Mouth: Mucous membranes are dry.   Eyes:      General: No scleral icterus.     Conjunctiva/sclera: Conjunctivae normal.   Cardiovascular:      Rate and Rhythm: Normal rate and regular rhythm.      Pulses: Normal pulses.      Heart sounds: Normal heart sounds.   Pulmonary:      Effort: Pulmonary effort is normal.   Abdominal:      General: Abdomen is flat. Bowel sounds are normal. There is no distension.      Palpations: There is no mass.      Tenderness: There is no abdominal tenderness.      Hernia: No hernia is present.   Musculoskeletal:         General: Normal range of motion.      Cervical back: Normal range of motion and neck supple.   Skin:     General: Skin is warm and dry.      Capillary Refill: Capillary refill takes less than 2 seconds.   Neurological:      General: No focal deficit present.      Mental Status: She is alert. Mental status is at baseline.   Psychiatric:         Mood and Affect: Mood normal.         Behavior: Behavior normal.       Labs 3/20/2023:   White blood cell count 2.1, hemoglobin 7.9, platelets 101  Creatinine 7.2, BUN 24    CT scan abdomen pelvis from 3/8/2023 was reviewed, only report available    IMPRESSION:   1. Diffuse third spacing. " There is moderate body wall anasarca, there are small pleural effusions and mild pulmonary edema is present. There is also a mild amount of pelvic ascites.   2. Moderate cardiomegaly.   3. Small pericardial effusion.   4. No urinary tract calculus or hydronephrosis.   5. Mild bilateral inguinal adenopathy. Additional prominent pelvic and retroperitoneal lymph nodes are present. Reactive adenopathy is favored but the adenopathy remains indeterminate. Follow-up CT examination of the abdomen and pelvis is recommended in   3 months as this degree of adenopathy is more than typically seen.      Assessment:   Dee Herrera is a 32 y.o. female with ESRD due to  lupus that is on HEMOdialysis and will need peritoneal dialysis catheter placement.  She has anemia with a hemoglobin 7.8.  We will repeat CBC preop, if less than 7.5 then she will need to have transfusion prior to surgery.     The procedure was explained in detail to the patient including risks and benefits.  The benefits including the possibility of having dialysis at home without the side effects of the hemodialysis.  The risks including but not limited to catheter dislodgment, obstruction, malfunction, bleeding, infection and possible injury to surrounding organs during peritoneal access. She is interested in proceeding with laparoscopic placement of peritoneal dialysis catheter. The patient understands that the catheter will not be used for dialysis for a period of approximately 2 weeks after its placement and that during this time they should not shower until the exit site is completely healed. The patient underwent at least one training session with the peritoneal dialysis nurse and their house was evaluated and is ready for the peritoneal dialysis. Patient verbalized understanding and agreed with the plan. All questions were answered at this time.      Plan:     - Laparoscopic peritoneal dialysis catheter placement, possible open.  - Preparation for surgery  orders have been placed  - Surgery scheduling.     Orders Placed This Encounter   Procedures   • Basic metabolic panel     Standing Status:   Future     Standing Expiration Date:   3/24/2024     Order Specific Question:   Release to patient     Answer:   Routine Release   • Obtain Informed Consent     Standing Status:   Future     Order Specific Question:   Informed Consent Given For     Answer:   Laparoscopic peritoneal dialysis catheter placement   • Provide NPO Instructions to Patient     Standing Status:   Future   • Chlorhexidine Skin Prep     Chlorhexidine Skin Prep and Instructions For All Patients Having A Procedure Requiring an Outward Incision if Not Allergic. If Allergic, Give Antibacterial Skin Wipes and Instructions. Do Not Use For Facial Cases or on Any Mucus Membranes.     Standing Status:   Future   • CBC and Differential     Standing Status:   Future     Standing Expiration Date:   3/24/2024     Order Specific Question:   Manual Differential     Answer:   No         Jemal Loyola MD  General, Minimally Invasive and Endoscopic Surgery  [facility]    [address] [place] [address], [address]  [place], [place], [address]  P: [phone]  F: [phone]

## 2023-03-24 NOTE — PROGRESS NOTES
Chief Complaint   Patient presents with   • PD Cath Consult       Subjective      Dee Herrera is a 32 y.o. female who is referred by Hair Mckeon MD to be evaluated for peritoneal dialysis catheter placement. Patient has ESRD secondary to lupus and  is on dialysis. She gets dialysis on Tuesday, Thursday and Saturday since 2023 . Patient does not have a AV access.  Patient has not had a recent intraabdominal infection. The patient reports having REGULAR bowel movements.  Patient did not have a Colonoscopy.     Home Evaluation: yes    Past Medical History:   Diagnosis Date   • History of anemia    • History of transfusion    • Hypertension    • Iron deficiency anemia 09/27/2021   • Lupus (systemic lupus erythematosus) (HCC) 07/30/2022   • Other specified nutritional anemias    • Seizures (HCC)    • Vitamin D deficiency 09/27/2021       Past Surgical History:   Procedure Laterality Date   • INSERTION HEMODIALYSIS CATHETER N/A 07/26/2022    Procedure: RIGHT TUNNELED DIALYSIS CATHETER PLACEMENT;  Surgeon: Diandra Adhikari MD;  Location: [facility];  Service: Vascular;  Laterality: N/A;   • NO PAST ABDOMINAL SURGERIES     • TONSILLECTOMY           Current Outpatient Medications:   •  carvedilol (COREG) 25 MG tablet, Take 1 tablet by mouth Every 12 (Twelve) Hours., Disp: 60 tablet, Rfl: 0  •  famotidine (PEPCID) 20 MG tablet, Take 1 tablet by mouth Daily., Disp: 30 tablet, Rfl: 0  •  hydrALAZINE (APRESOLINE) 25 MG tablet, Take 1 tablet by mouth 3 (Three) Times a Day., Disp: , Rfl:   •  hydroxychloroquine (PLAQUENIL) 200 MG tablet, 1 tablet., Disp: , Rfl:   •  lacosamide (VIMPAT) 50 MG tablet tablet, Take 1 tablet by mouth Every 12 (Twelve) Hours., Disp: 60 tablet, Rfl: 0  •  losartan (COZAAR) 50 MG tablet, 1 tablet., Disp: , Rfl:   •  Methoxy PEG-Epoetin Beta (MIRCERA IJ), 225 mcg Every 14 (Fourteen) Days., Disp: , Rfl:   •  mycophenolate (CELLCEPT) 500 MG tablet, Take 0.5 tablets by mouth Every 12  (Twelve) Hours., Disp: 30 tablet, Rfl: 0  •  NIFEdipine CC (ADALAT CC) 30 MG 24 hr tablet, Take  by mouth., Disp: , Rfl:   •  NIFEdipine CC (ADALAT CC) 60 MG 24 hr tablet, Take 1 tablet by mouth 2 (Two) Times a Day., Disp: 60 tablet, Rfl: 0  •  predniSONE (DELTASONE) 10 MG tablet, Take 1 tablet by mouth Daily., Disp: 30 tablet, Rfl: 0  •  simethicone (MYLICON) 80 MG chewable tablet, Chew 1 tablet 4 (Four) Times a Day As Needed for Flatulence., Disp: 30 tablet, Rfl: 0  •  hydrALAZINE (APRESOLINE) 25 MG tablet, Take 1 tablet by mouth Every 8 (Eight) Hours for 30 days., Disp: 90 tablet, Rfl: 2    Allergies   Allergen Reactions   • Minoxidil Other (See Comments)     Pericardial effusion .       Family History   Problem Relation Age of Onset   • Autoimmune disease Mother    • Anemia Mother    • Diabetes Sister    • Anemia Brother    • Diabetes Maternal Grandmother    • Hypertension Maternal Grandmother    • Cancer Maternal Grandmother    • Sickle cell anemia Cousin    • Malig Hyperthermia Neg Hx        Social History     Socioeconomic History   • Marital status: Single   Tobacco Use   • Smoking status: Former     Types: Cigars   • Smokeless tobacco: Never   • Tobacco comments:     Patient smoked black & mild   Vaping Use   • Vaping Use: Never used   Substance and Sexual Activity   • Alcohol use: Yes     Comment: social   • Drug use: Yes     Types: Marijuana   • Sexual activity: Not Currently     Partners: Male     Birth control/protection: None     REVIEW OF SYSTEMS    Review of Systems   Constitutional: Negative for activity change, fatigue and fever.   HENT: Negative for congestion and dental problem.    Eyes: Negative for discharge and itching.   Respiratory: Negative for apnea, cough and choking.    Cardiovascular: Positive for leg swelling. Negative for chest pain.   Gastrointestinal: Negative for abdominal distention and abdominal pain.   Endocrine: Negative for cold intolerance and heat intolerance.  "  Genitourinary: Negative for difficulty urinating, frequency and genital sores.   Musculoskeletal: Positive for arthralgias. Negative for back pain and gait problem.   Allergic/Immunologic: Negative for environmental allergies and food allergies.   Neurological: Negative for dizziness and facial asymmetry.   Hematological: Negative for adenopathy. Does not bruise/bleed easily.   Psychiatric/Behavioral: Negative for agitation. The patient is not nervous/anxious.        Physical Examination  Ht 165.1 cm (65\")   Wt 53.5 kg (118 lb)   BMI 19.64 kg/m²   Body mass index is 19.64 kg/m².  Physical Exam  Constitutional:       Appearance: She is normal weight.   HENT:      Head: Normocephalic and atraumatic.      Nose: Nose normal.      Mouth/Throat:      Mouth: Mucous membranes are dry.   Eyes:      General: No scleral icterus.     Conjunctiva/sclera: Conjunctivae normal.   Cardiovascular:      Rate and Rhythm: Normal rate and regular rhythm.      Pulses: Normal pulses.      Heart sounds: Normal heart sounds.   Pulmonary:      Effort: Pulmonary effort is normal.   Abdominal:      General: Abdomen is flat. Bowel sounds are normal. There is no distension.      Palpations: There is no mass.      Tenderness: There is no abdominal tenderness.      Hernia: No hernia is present.   Musculoskeletal:         General: Normal range of motion.      Cervical back: Normal range of motion and neck supple.   Skin:     General: Skin is warm and dry.      Capillary Refill: Capillary refill takes less than 2 seconds.   Neurological:      General: No focal deficit present.      Mental Status: She is alert. Mental status is at baseline.   Psychiatric:         Mood and Affect: Mood normal.         Behavior: Behavior normal.       Labs 3/20/2023:   White blood cell count 2.1, hemoglobin 7.9, platelets 101  Creatinine 7.2, BUN 24    CT scan abdomen pelvis from 3/8/2023 was reviewed, only report available    IMPRESSION:   1. Diffuse third spacing. " There is moderate body wall anasarca, there are small pleural effusions and mild pulmonary edema is present. There is also a mild amount of pelvic ascites.   2. Moderate cardiomegaly.   3. Small pericardial effusion.   4. No urinary tract calculus or hydronephrosis.   5. Mild bilateral inguinal adenopathy. Additional prominent pelvic and retroperitoneal lymph nodes are present. Reactive adenopathy is favored but the adenopathy remains indeterminate. Follow-up CT examination of the abdomen and pelvis is recommended in   3 months as this degree of adenopathy is more than typically seen.      Assessment:   Dee Herrera is a 32 y.o. female with ESRD due to  lupus that is on HEMOdialysis and will need peritoneal dialysis catheter placement.  She has anemia with a hemoglobin 7.8.  We will repeat CBC preop, if less than 7.5 then she will need to have transfusion prior to surgery.     The procedure was explained in detail to the patient including risks and benefits.  The benefits including the possibility of having dialysis at home without the side effects of the hemodialysis.  The risks including but not limited to catheter dislodgment, obstruction, malfunction, bleeding, infection and possible injury to surrounding organs during peritoneal access. She is interested in proceeding with laparoscopic placement of peritoneal dialysis catheter. The patient understands that the catheter will not be used for dialysis for a period of approximately 2 weeks after its placement and that during this time they should not shower until the exit site is completely healed. The patient underwent at least one training session with the peritoneal dialysis nurse and their house was evaluated and is ready for the peritoneal dialysis. Patient verbalized understanding and agreed with the plan. All questions were answered at this time.      Plan:     - Laparoscopic peritoneal dialysis catheter placement, possible open.  - Preparation for surgery  orders have been placed  - Surgery scheduling.     Orders Placed This Encounter   Procedures   • Basic metabolic panel     Standing Status:   Future     Standing Expiration Date:   3/24/2024     Order Specific Question:   Release to patient     Answer:   Routine Release   • Obtain Informed Consent     Standing Status:   Future     Order Specific Question:   Informed Consent Given For     Answer:   Laparoscopic peritoneal dialysis catheter placement   • Provide NPO Instructions to Patient     Standing Status:   Future   • Chlorhexidine Skin Prep     Chlorhexidine Skin Prep and Instructions For All Patients Having A Procedure Requiring an Outward Incision if Not Allergic. If Allergic, Give Antibacterial Skin Wipes and Instructions. Do Not Use For Facial Cases or on Any Mucus Membranes.     Standing Status:   Future   • CBC and Differential     Standing Status:   Future     Standing Expiration Date:   3/24/2024     Order Specific Question:   Manual Differential     Answer:   No         Jemal Loyola MD  General, Minimally Invasive and Endoscopic Surgery  [facility]    [address] [place] [address], [address]  [place], [place], [address]  P: [phone]  F: [phone]

## 2023-03-27 ENCOUNTER — LAB (OUTPATIENT)
Dept: LAB | Facility: HOSPITAL | Age: 33
End: 2023-03-27
Payer: MEDICAID

## 2023-03-27 ENCOUNTER — LAB (OUTPATIENT)
Dept: GASTROENTEROLOGY | Facility: CLINIC | Age: 33
End: 2023-03-27
Payer: MEDICAID

## 2023-03-27 ENCOUNTER — OFFICE VISIT (OUTPATIENT)
Dept: FAMILY MEDICINE CLINIC | Facility: CLINIC | Age: 33
End: 2023-03-27
Payer: MEDICAID

## 2023-03-27 VITALS
SYSTOLIC BLOOD PRESSURE: 190 MMHG | DIASTOLIC BLOOD PRESSURE: 100 MMHG | BODY MASS INDEX: 20.23 KG/M2 | WEIGHT: 121.4 LBS | TEMPERATURE: 98.2 F | HEIGHT: 65 IN

## 2023-03-27 DIAGNOSIS — I10 UNCONTROLLED HYPERTENSION: Primary | ICD-10-CM

## 2023-03-27 DIAGNOSIS — N18.6 ESRD ON HEMODIALYSIS: ICD-10-CM

## 2023-03-27 DIAGNOSIS — Z99.2 ESRD ON HEMODIALYSIS: ICD-10-CM

## 2023-03-27 DIAGNOSIS — N18.6 ESRD (END STAGE RENAL DISEASE): ICD-10-CM

## 2023-03-27 LAB
ANION GAP SERPL CALCULATED.3IONS-SCNC: 12 MMOL/L (ref 5–15)
BASOPHILS # BLD AUTO: 0 10*3/MM3 (ref 0–0.2)
BASOPHILS NFR BLD AUTO: 0 % (ref 0–1.5)
BUN SERPL-MCNC: 27 MG/DL (ref 6–20)
BUN/CREAT SERPL: 3.8 (ref 7–25)
CALCIUM SPEC-SCNC: 8.9 MG/DL (ref 8.6–10.5)
CHLORIDE SERPL-SCNC: 100 MMOL/L (ref 98–107)
CO2 SERPL-SCNC: 29 MMOL/L (ref 22–29)
CREAT SERPL-MCNC: 7.1 MG/DL (ref 0.57–1)
DEPRECATED RDW RBC AUTO: 42.2 FL (ref 37–54)
EGFRCR SERPLBLD CKD-EPI 2021: 7.4 ML/MIN/1.73
EOSINOPHIL # BLD AUTO: 0 10*3/MM3 (ref 0–0.4)
EOSINOPHIL NFR BLD AUTO: 0 % (ref 0.3–6.2)
ERYTHROCYTE [DISTWIDTH] IN BLOOD BY AUTOMATED COUNT: 14.6 % (ref 12.3–15.4)
GLUCOSE SERPL-MCNC: 106 MG/DL (ref 65–99)
HCT VFR BLD AUTO: 26.8 % (ref 34–46.6)
HGB BLD-MCNC: 8.4 G/DL (ref 12–15.9)
LYMPHOCYTES # BLD AUTO: 0.3 10*3/MM3 (ref 0.7–3.1)
LYMPHOCYTES NFR BLD AUTO: 11.8 % (ref 19.6–45.3)
MCH RBC QN AUTO: 25.5 PG (ref 26.6–33)
MCHC RBC AUTO-ENTMCNC: 31.3 G/DL (ref 31.5–35.7)
MCV RBC AUTO: 81.2 FL (ref 79–97)
MONOCYTES # BLD AUTO: 0.31 10*3/MM3 (ref 0.1–0.9)
MONOCYTES NFR BLD AUTO: 12.2 % (ref 5–12)
NEUTROPHILS NFR BLD AUTO: 1.94 10*3/MM3 (ref 1.7–7)
NEUTROPHILS NFR BLD AUTO: 76 % (ref 42.7–76)
PLATELET # BLD AUTO: 62 10*3/MM3 (ref 140–450)
POTASSIUM SERPL-SCNC: 4.7 MMOL/L (ref 3.5–5.2)
RBC # BLD AUTO: 3.3 10*6/MM3 (ref 3.77–5.28)
SODIUM SERPL-SCNC: 141 MMOL/L (ref 136–145)
WBC NRBC COR # BLD: 2.55 10*3/MM3 (ref 3.4–10.8)

## 2023-03-27 PROCEDURE — 80048 BASIC METABOLIC PNL TOTAL CA: CPT

## 2023-03-27 PROCEDURE — 85025 COMPLETE CBC W/AUTO DIFF WBC: CPT

## 2023-03-27 PROCEDURE — 36415 COLL VENOUS BLD VENIPUNCTURE: CPT

## 2023-03-27 RX ORDER — NIFEDIPINE 90 MG/1
90 TABLET, EXTENDED RELEASE ORAL DAILY
Qty: 30 TABLET | Refills: 0 | Status: SHIPPED | OUTPATIENT
Start: 2023-03-27

## 2023-03-27 NOTE — PATIENT INSTRUCTIONS
Please keep blood pressure log and report to me or Dr. Balderas in 1 week: take bp 1 hour after taking BP medication in morning. If BP >180/100 with headache, visual changes, dizziness, shortness of air, chest pain, peripheral edema, weakness on one side of your body, or slurred speech, please go to nearest ER or call 911.    Bring blood pressure log to your 1 week follow-up appointment with Dr. Balderas or Dr. Mckeon.    You need to get reestablished with a new primary care doctor asap d/t Dr. Leora naylor.     Patient agrees with plan of care and understands instructions. Call if worsening symptoms or any problems or concerns.

## 2023-03-27 NOTE — PROGRESS NOTES
"Chief Complaint  Hypertension (Having headaches. Admitted to [facility] in March-trouble breathing. ) and Hospital Follow Up Visit    Subjective        Dee Herrera presents to [facility]  History of Present Illness  Patient is a 32-year-old female, new patient to me and established with Dr. Corey.  Patient recently seen at [facility] for headaches and hypertension, unable to obtain record in office today. Patient has ESRD and is on kidney transplant list.  Follows Dr. Balderas with cardiology and Dr. Mckeon with nephrology who helps manage the patient's blood pressure. With hypertension, patient is prescribed nifedipine 60 mg tablet 2 times daily, hydralazine 25 mg tablet 3 times daily and carvedilol 25 mg tablet every 12 hours.  Today, patient's blood pressure 190/100.  Patient reports she correlates her high blood pressure with getting her hair done due to having tension headaches. Denies visual changes, dizziness, shortness of air, chest pain, peripheral edema, hemiparesis, or slurred speech.     Objective   Vital Signs:  BP (!) 190/100 (BP Location: Left arm, Patient Position: Sitting, Cuff Size: Adult)   Temp 98.2 °F (36.8 °C)   Ht 165.1 cm (65\")   Wt 55.1 kg (121 lb 6.4 oz)   BMI 20.20 kg/m²   Estimated body mass index is 20.2 kg/m² as calculated from the following:    Height as of this encounter: 165.1 cm (65\").    Weight as of this encounter: 55.1 kg (121 lb 6.4 oz).       BMI is within normal parameters. No other follow-up for BMI required.      Physical Exam  Constitutional:       General: She is awake.      Appearance: Normal appearance.   HENT:      Head: Normocephalic and atraumatic.      Nose: Nose normal.   Eyes:      Extraocular Movements: Extraocular movements intact.      Conjunctiva/sclera: Conjunctivae normal.      Pupils: Pupils are equal, round, and reactive to light.   Cardiovascular:      Rate and Rhythm: Normal rate and regular rhythm. "      Pulses: Normal pulses.      Heart sounds: Normal heart sounds.   Pulmonary:      Effort: Pulmonary effort is normal.      Breath sounds: Normal breath sounds and air entry.   Skin:     General: Skin is warm and dry.   Neurological:      General: No focal deficit present.      Mental Status: She is alert and oriented to person, place, and time. Mental status is at baseline.   Psychiatric:         Attention and Perception: Attention normal.         Behavior: Behavior normal. Behavior is cooperative.        Result Review :                   Assessment and Plan   Diagnoses and all orders for this visit:    1. Uncontrolled hypertension (Primary)  -     NIFEdipine XL (PROCARDIA XL) 90 MG 24 hr tablet; Take 1 tablet by mouth Daily.  Dispense: 30 tablet; Refill: 0    2. ESRD (end stage renal disease) ([facility])    Please keep blood pressure log and report to me or Dr. Balderas in 1 week: take bp 1 hour after taking BP medication in morning. If BP >180/100 with headache, visual changes, dizziness, shortness of air, chest pain, peripheral edema, weakness on one side of your body, or slurred speech, please go to nearest ER or call 911.    Bring blood pressure log to your 1 week follow-up appointment with Dr. Balderas or Dr. Mckeon.    You need to get reestablished with a new primary care doctor asap d/t Dr. Leora naylor.     Patient agrees with plan of care and understands instructions. Call if worsening symptoms or any problems or concerns.            Follow Up   Return in about 1 week (around 4/3/2023).  Patient was given instructions and counseling regarding her condition or for health maintenance advice. Please see specific information pulled into the AVS if appropriate.

## 2023-04-03 ENCOUNTER — ANESTHESIA EVENT (OUTPATIENT)
Dept: PERIOP | Facility: HOSPITAL | Age: 33
End: 2023-04-03
Payer: MEDICAID

## 2023-04-03 ENCOUNTER — HOSPITAL ENCOUNTER (OUTPATIENT)
Facility: HOSPITAL | Age: 33
Setting detail: HOSPITAL OUTPATIENT SURGERY
Discharge: HOME | End: 2023-04-03
Attending: SURGERY | Admitting: SURGERY
Payer: MEDICAID

## 2023-04-03 ENCOUNTER — ANESTHESIA (OUTPATIENT)
Dept: PERIOP | Facility: HOSPITAL | Age: 33
End: 2023-04-03
Payer: MEDICAID

## 2023-04-03 VITALS
HEIGHT: 65 IN | WEIGHT: 116 LBS | OXYGEN SATURATION: 95 % | BODY MASS INDEX: 19.33 KG/M2 | TEMPERATURE: 98.8 F | RESPIRATION RATE: 16 BRPM | DIASTOLIC BLOOD PRESSURE: 108 MMHG | SYSTOLIC BLOOD PRESSURE: 152 MMHG | HEART RATE: 81 BPM

## 2023-04-03 DIAGNOSIS — Z99.2 PERITONEAL DIALYSIS CATHETER IN PLACE: Primary | ICD-10-CM

## 2023-04-03 DIAGNOSIS — Z99.2 ESRD ON HEMODIALYSIS: ICD-10-CM

## 2023-04-03 DIAGNOSIS — N18.6 ESRD ON HEMODIALYSIS: ICD-10-CM

## 2023-04-03 LAB
ANION GAP SERPL CALCULATED.3IONS-SCNC: 13 MMOL/L (ref 5–15)
BUN SERPL-MCNC: 24 MG/DL (ref 6–20)
BUN/CREAT SERPL: 3.6 (ref 7–25)
CALCIUM SPEC-SCNC: 9.1 MG/DL (ref 8.6–10.5)
CHLORIDE SERPL-SCNC: 94 MMOL/L (ref 98–107)
CO2 SERPL-SCNC: 28 MMOL/L (ref 22–29)
CREAT SERPL-MCNC: 6.66 MG/DL (ref 0.57–1)
EGFRCR SERPLBLD CKD-EPI 2021: 8 ML/MIN/1.73
GLUCOSE SERPL-MCNC: 83 MG/DL (ref 65–99)
HCG SERPL QL: NEGATIVE
POTASSIUM SERPL-SCNC: 4.3 MMOL/L (ref 3.5–5.2)
SODIUM SERPL-SCNC: 135 MMOL/L (ref 136–145)

## 2023-04-03 PROCEDURE — C1750 CATH, HEMODIALYSIS,LONG-TERM: HCPCS | Performed by: SURGERY

## 2023-04-03 PROCEDURE — 25010000002 HEPARIN (PORCINE) PER 1000 UNITS: Performed by: SURGERY

## 2023-04-03 PROCEDURE — 25010000002 MIDAZOLAM PER 1 MG: Performed by: ANESTHESIOLOGY

## 2023-04-03 PROCEDURE — 25010000002 DEXAMETHASONE SODIUM PHOSPHATE 20 MG/5ML SOLUTION: Performed by: NURSE ANESTHETIST, CERTIFIED REGISTERED

## 2023-04-03 PROCEDURE — 49324 LAP INSERT TUNNEL IP CATH: CPT | Performed by: SURGERY

## 2023-04-03 PROCEDURE — 25010000002 HYDRALAZINE PER 20 MG: Performed by: NURSE ANESTHETIST, CERTIFIED REGISTERED

## 2023-04-03 PROCEDURE — 25010000002 FENTANYL CITRATE (PF) 50 MCG/ML SOLUTION: Performed by: NURSE ANESTHETIST, CERTIFIED REGISTERED

## 2023-04-03 PROCEDURE — 25010000002 VANCOMYCIN 750 MG RECONSTITUTED SOLUTION 1 EACH VIAL: Performed by: SURGERY

## 2023-04-03 PROCEDURE — 49326 LAP W/OMENTOPEXY ADD-ON: CPT | Performed by: SPECIALIST/TECHNOLOGIST, OTHER

## 2023-04-03 PROCEDURE — 25010000002 ONDANSETRON PER 1 MG: Performed by: NURSE ANESTHETIST, CERTIFIED REGISTERED

## 2023-04-03 PROCEDURE — 80048 BASIC METABOLIC PNL TOTAL CA: CPT | Performed by: SURGERY

## 2023-04-03 PROCEDURE — 25010000002 PROPOFOL 10 MG/ML EMULSION: Performed by: NURSE ANESTHETIST, CERTIFIED REGISTERED

## 2023-04-03 PROCEDURE — 49324 LAP INSERT TUNNEL IP CATH: CPT | Performed by: SPECIALIST/TECHNOLOGIST, OTHER

## 2023-04-03 PROCEDURE — 84703 CHORIONIC GONADOTROPIN ASSAY: CPT | Performed by: SURGERY

## 2023-04-03 PROCEDURE — 49326 LAP W/OMENTOPEXY ADD-ON: CPT | Performed by: SURGERY

## 2023-04-03 PROCEDURE — 25010000002 HYDROMORPHONE PER 4 MG: Performed by: NURSE ANESTHETIST, CERTIFIED REGISTERED

## 2023-04-03 DEVICE — IMPLANTABLE DEVICE: Type: IMPLANTABLE DEVICE | Site: ABDOMEN | Status: FUNCTIONAL

## 2023-04-03 RX ORDER — OXYCODONE AND ACETAMINOPHEN 7.5; 325 MG/1; MG/1
1 TABLET ORAL ONCE AS NEEDED
Status: DISCONTINUED | OUTPATIENT
Start: 2023-04-03 | End: 2023-04-03 | Stop reason: HOSPADM

## 2023-04-03 RX ORDER — SENNA PLUS 8.6 MG/1
1 TABLET ORAL ONCE
Status: DISCONTINUED | OUTPATIENT
Start: 2023-04-03 | End: 2023-04-03 | Stop reason: HOSPADM

## 2023-04-03 RX ORDER — PROPOFOL 10 MG/ML
VIAL (ML) INTRAVENOUS AS NEEDED
Status: DISCONTINUED | OUTPATIENT
Start: 2023-04-03 | End: 2023-04-03 | Stop reason: SURG

## 2023-04-03 RX ORDER — PROMETHAZINE HYDROCHLORIDE 25 MG/1
12.5 TABLET ORAL ONCE AS NEEDED
Status: DISCONTINUED | OUTPATIENT
Start: 2023-04-03 | End: 2023-04-03 | Stop reason: HOSPADM

## 2023-04-03 RX ORDER — DEXAMETHASONE SODIUM PHOSPHATE 4 MG/ML
INJECTION, SOLUTION INTRA-ARTICULAR; INTRALESIONAL; INTRAMUSCULAR; INTRAVENOUS; SOFT TISSUE AS NEEDED
Status: DISCONTINUED | OUTPATIENT
Start: 2023-04-03 | End: 2023-04-03 | Stop reason: SURG

## 2023-04-03 RX ORDER — ONDANSETRON 2 MG/ML
4 INJECTION INTRAMUSCULAR; INTRAVENOUS ONCE AS NEEDED
Status: DISCONTINUED | OUTPATIENT
Start: 2023-04-03 | End: 2023-04-03 | Stop reason: HOSPADM

## 2023-04-03 RX ORDER — BUPIVACAINE HYDROCHLORIDE AND EPINEPHRINE 5; 5 MG/ML; UG/ML
INJECTION, SOLUTION EPIDURAL; INTRACAUDAL; PERINEURAL AS NEEDED
Status: DISCONTINUED | OUTPATIENT
Start: 2023-04-03 | End: 2023-04-03 | Stop reason: HOSPADM

## 2023-04-03 RX ORDER — FLUMAZENIL 0.1 MG/ML
0.2 INJECTION INTRAVENOUS AS NEEDED
Status: DISCONTINUED | OUTPATIENT
Start: 2023-04-03 | End: 2023-04-03 | Stop reason: HOSPADM

## 2023-04-03 RX ORDER — SODIUM CHLORIDE 0.9 % (FLUSH) 0.9 %
3-10 SYRINGE (ML) INJECTION AS NEEDED
Status: DISCONTINUED | OUTPATIENT
Start: 2023-04-03 | End: 2023-04-03 | Stop reason: HOSPADM

## 2023-04-03 RX ORDER — FAMOTIDINE 10 MG/ML
20 INJECTION, SOLUTION INTRAVENOUS ONCE
Status: COMPLETED | OUTPATIENT
Start: 2023-04-03 | End: 2023-04-03

## 2023-04-03 RX ORDER — PROMETHAZINE HYDROCHLORIDE 25 MG/1
25 SUPPOSITORY RECTAL ONCE AS NEEDED
Status: DISCONTINUED | OUTPATIENT
Start: 2023-04-03 | End: 2023-04-03 | Stop reason: HOSPADM

## 2023-04-03 RX ORDER — DROPERIDOL 2.5 MG/ML
0.62 INJECTION, SOLUTION INTRAMUSCULAR; INTRAVENOUS
Status: DISCONTINUED | OUTPATIENT
Start: 2023-04-03 | End: 2023-04-03 | Stop reason: HOSPADM

## 2023-04-03 RX ORDER — SODIUM CHLORIDE 0.9 % (FLUSH) 0.9 %
3 SYRINGE (ML) INJECTION EVERY 12 HOURS SCHEDULED
Status: DISCONTINUED | OUTPATIENT
Start: 2023-04-03 | End: 2023-04-03 | Stop reason: HOSPADM

## 2023-04-03 RX ORDER — LABETALOL HYDROCHLORIDE 5 MG/ML
5 INJECTION, SOLUTION INTRAVENOUS
Status: COMPLETED | OUTPATIENT
Start: 2023-04-03 | End: 2023-04-03

## 2023-04-03 RX ORDER — MIDAZOLAM HYDROCHLORIDE 1 MG/ML
1 INJECTION INTRAMUSCULAR; INTRAVENOUS
Status: DISCONTINUED | OUTPATIENT
Start: 2023-04-03 | End: 2023-04-03 | Stop reason: HOSPADM

## 2023-04-03 RX ORDER — FENTANYL CITRATE 50 UG/ML
50 INJECTION, SOLUTION INTRAMUSCULAR; INTRAVENOUS
Status: DISCONTINUED | OUTPATIENT
Start: 2023-04-03 | End: 2023-04-03 | Stop reason: HOSPADM

## 2023-04-03 RX ORDER — HYDROCODONE BITARTRATE AND ACETAMINOPHEN 5; 325 MG/1; MG/1
1 TABLET ORAL EVERY 6 HOURS PRN
Qty: 20 TABLET | Refills: 0 | Status: SHIPPED | OUTPATIENT
Start: 2023-04-03 | End: 2023-04-03 | Stop reason: SDUPTHER

## 2023-04-03 RX ORDER — HYDROMORPHONE HYDROCHLORIDE 1 MG/ML
0.5 INJECTION, SOLUTION INTRAMUSCULAR; INTRAVENOUS; SUBCUTANEOUS
Status: DISCONTINUED | OUTPATIENT
Start: 2023-04-03 | End: 2023-04-03 | Stop reason: HOSPADM

## 2023-04-03 RX ORDER — HYDROCODONE BITARTRATE AND ACETAMINOPHEN 7.5; 325 MG/1; MG/1
1 TABLET ORAL ONCE AS NEEDED
Status: COMPLETED | OUTPATIENT
Start: 2023-04-03 | End: 2023-04-03

## 2023-04-03 RX ORDER — LIDOCAINE HYDROCHLORIDE 20 MG/ML
INJECTION, SOLUTION INFILTRATION; PERINEURAL AS NEEDED
Status: DISCONTINUED | OUTPATIENT
Start: 2023-04-03 | End: 2023-04-03 | Stop reason: SURG

## 2023-04-03 RX ORDER — OXYCODONE AND ACETAMINOPHEN 7.5; 325 MG/1; MG/1
1 TABLET ORAL EVERY 4 HOURS PRN
Status: DISCONTINUED | OUTPATIENT
Start: 2023-04-03 | End: 2023-04-03 | Stop reason: HOSPADM

## 2023-04-03 RX ORDER — LABETALOL HYDROCHLORIDE 5 MG/ML
INJECTION, SOLUTION INTRAVENOUS AS NEEDED
Status: DISCONTINUED | OUTPATIENT
Start: 2023-04-03 | End: 2023-04-03 | Stop reason: SURG

## 2023-04-03 RX ORDER — HYDROCODONE BITARTRATE AND ACETAMINOPHEN 5; 325 MG/1; MG/1
1 TABLET ORAL EVERY 6 HOURS PRN
Qty: 20 TABLET | Refills: 0 | Status: SHIPPED | OUTPATIENT
Start: 2023-04-03

## 2023-04-03 RX ORDER — ACETAMINOPHEN 325 MG/1
650 TABLET ORAL ONCE
Status: DISCONTINUED | OUTPATIENT
Start: 2023-04-03 | End: 2023-04-03 | Stop reason: HOSPADM

## 2023-04-03 RX ORDER — EPHEDRINE SULFATE 50 MG/ML
5 INJECTION, SOLUTION INTRAVENOUS ONCE AS NEEDED
Status: DISCONTINUED | OUTPATIENT
Start: 2023-04-03 | End: 2023-04-03 | Stop reason: HOSPADM

## 2023-04-03 RX ORDER — ONDANSETRON 4 MG/1
4 TABLET, FILM COATED ORAL EVERY 8 HOURS PRN
Qty: 30 TABLET | Refills: 1 | Status: SHIPPED | OUTPATIENT
Start: 2023-04-03 | End: 2024-04-02

## 2023-04-03 RX ORDER — FENTANYL CITRATE 50 UG/ML
INJECTION, SOLUTION INTRAMUSCULAR; INTRAVENOUS AS NEEDED
Status: DISCONTINUED | OUTPATIENT
Start: 2023-04-03 | End: 2023-04-03 | Stop reason: SURG

## 2023-04-03 RX ORDER — ONDANSETRON 2 MG/ML
INJECTION INTRAMUSCULAR; INTRAVENOUS AS NEEDED
Status: DISCONTINUED | OUTPATIENT
Start: 2023-04-03 | End: 2023-04-03 | Stop reason: SURG

## 2023-04-03 RX ORDER — NALOXONE HCL 0.4 MG/ML
0.2 VIAL (ML) INJECTION AS NEEDED
Status: DISCONTINUED | OUTPATIENT
Start: 2023-04-03 | End: 2023-04-03 | Stop reason: HOSPADM

## 2023-04-03 RX ORDER — DIPHENHYDRAMINE HYDROCHLORIDE 50 MG/ML
12.5 INJECTION INTRAMUSCULAR; INTRAVENOUS
Status: DISCONTINUED | OUTPATIENT
Start: 2023-04-03 | End: 2023-04-03 | Stop reason: HOSPADM

## 2023-04-03 RX ORDER — IPRATROPIUM BROMIDE AND ALBUTEROL SULFATE 2.5; .5 MG/3ML; MG/3ML
3 SOLUTION RESPIRATORY (INHALATION) ONCE AS NEEDED
Status: DISCONTINUED | OUTPATIENT
Start: 2023-04-03 | End: 2023-04-03 | Stop reason: HOSPADM

## 2023-04-03 RX ORDER — ROCURONIUM BROMIDE 10 MG/ML
INJECTION, SOLUTION INTRAVENOUS AS NEEDED
Status: DISCONTINUED | OUTPATIENT
Start: 2023-04-03 | End: 2023-04-03 | Stop reason: SURG

## 2023-04-03 RX ORDER — LIDOCAINE HYDROCHLORIDE 10 MG/ML
0.5 INJECTION, SOLUTION EPIDURAL; INFILTRATION; INTRACAUDAL; PERINEURAL ONCE AS NEEDED
Status: DISCONTINUED | OUTPATIENT
Start: 2023-04-03 | End: 2023-04-03 | Stop reason: HOSPADM

## 2023-04-03 RX ORDER — AMOXICILLIN 250 MG
2 CAPSULE ORAL DAILY PRN
Qty: 30 TABLET | Refills: 1 | Status: SHIPPED | OUTPATIENT
Start: 2023-04-03 | End: 2024-04-02

## 2023-04-03 RX ORDER — MAGNESIUM HYDROXIDE 1200 MG/15ML
LIQUID ORAL AS NEEDED
Status: DISCONTINUED | OUTPATIENT
Start: 2023-04-03 | End: 2023-04-03 | Stop reason: HOSPADM

## 2023-04-03 RX ORDER — PROMETHAZINE HYDROCHLORIDE 25 MG/1
25 TABLET ORAL ONCE AS NEEDED
Status: DISCONTINUED | OUTPATIENT
Start: 2023-04-03 | End: 2023-04-03 | Stop reason: HOSPADM

## 2023-04-03 RX ORDER — SODIUM CHLORIDE 9 MG/ML
INJECTION, SOLUTION INTRAVENOUS CONTINUOUS PRN
Status: DISCONTINUED | OUTPATIENT
Start: 2023-04-03 | End: 2023-04-03 | Stop reason: SURG

## 2023-04-03 RX ORDER — HYDRALAZINE HYDROCHLORIDE 20 MG/ML
5 INJECTION INTRAMUSCULAR; INTRAVENOUS
Status: DISCONTINUED | OUTPATIENT
Start: 2023-04-03 | End: 2023-04-03 | Stop reason: HOSPADM

## 2023-04-03 RX ORDER — SODIUM CHLORIDE, SODIUM LACTATE, POTASSIUM CHLORIDE, CALCIUM CHLORIDE 600; 310; 30; 20 MG/100ML; MG/100ML; MG/100ML; MG/100ML
9 INJECTION, SOLUTION INTRAVENOUS CONTINUOUS
Status: DISCONTINUED | OUTPATIENT
Start: 2023-04-03 | End: 2023-04-03 | Stop reason: HOSPADM

## 2023-04-03 RX ADMIN — ROCURONIUM BROMIDE 30 MG: 10 INJECTION, SOLUTION INTRAVENOUS at 13:24

## 2023-04-03 RX ADMIN — FENTANYL CITRATE 100 MCG: 50 INJECTION, SOLUTION INTRAMUSCULAR; INTRAVENOUS at 13:20

## 2023-04-03 RX ADMIN — HYDROCODONE BITARTRATE AND ACETAMINOPHEN 1 TABLET: 7.5; 325 TABLET ORAL at 15:23

## 2023-04-03 RX ADMIN — LABETALOL HYDROCHLORIDE 5 MG: 5 INJECTION, SOLUTION INTRAVENOUS at 15:55

## 2023-04-03 RX ADMIN — PROPOFOL 150 MG: 10 INJECTION, EMULSION INTRAVENOUS at 13:24

## 2023-04-03 RX ADMIN — ONDANSETRON 4 MG: 2 INJECTION INTRAMUSCULAR; INTRAVENOUS at 13:53

## 2023-04-03 RX ADMIN — HYDRALAZINE HYDROCHLORIDE 5 MG: 20 INJECTION INTRAMUSCULAR; INTRAVENOUS at 14:44

## 2023-04-03 RX ADMIN — HYDRALAZINE HYDROCHLORIDE 5 MG: 20 INJECTION INTRAMUSCULAR; INTRAVENOUS at 14:34

## 2023-04-03 RX ADMIN — SUGAMMADEX 200 MG: 100 INJECTION, SOLUTION INTRAVENOUS at 14:03

## 2023-04-03 RX ADMIN — MIDAZOLAM 1 MG: 1 INJECTION INTRAMUSCULAR; INTRAVENOUS at 09:50

## 2023-04-03 RX ADMIN — LABETALOL HYDROCHLORIDE 5 MG: 5 INJECTION, SOLUTION INTRAVENOUS at 15:51

## 2023-04-03 RX ADMIN — LIDOCAINE HYDROCHLORIDE 80 MG: 20 INJECTION, SOLUTION INFILTRATION; PERINEURAL at 13:24

## 2023-04-03 RX ADMIN — HYDROMORPHONE HYDROCHLORIDE 0.5 MG: 1 INJECTION, SOLUTION INTRAMUSCULAR; INTRAVENOUS; SUBCUTANEOUS at 15:25

## 2023-04-03 RX ADMIN — LABETALOL HYDROCHLORIDE 5 MG: 5 INJECTION, SOLUTION INTRAVENOUS at 16:10

## 2023-04-03 RX ADMIN — SODIUM CHLORIDE: 9 INJECTION, SOLUTION INTRAVENOUS at 13:15

## 2023-04-03 RX ADMIN — LABETALOL HYDROCHLORIDE 10 MG: 5 INJECTION, SOLUTION INTRAVENOUS at 13:47

## 2023-04-03 RX ADMIN — FAMOTIDINE 20 MG: 10 INJECTION INTRAVENOUS at 09:44

## 2023-04-03 RX ADMIN — LABETALOL HYDROCHLORIDE 5 MG: 5 INJECTION, SOLUTION INTRAVENOUS at 15:58

## 2023-04-03 RX ADMIN — DEXAMETHASONE SODIUM PHOSPHATE 10 MG: 4 INJECTION, SOLUTION INTRAMUSCULAR; INTRAVENOUS at 13:31

## 2023-04-03 RX ADMIN — VANCOMYCIN HYDROCHLORIDE 750 MG: 750 INJECTION, POWDER, LYOPHILIZED, FOR SOLUTION INTRAVENOUS at 09:45

## 2023-04-03 NOTE — ANESTHESIA PREPROCEDURE EVALUATION
Anesthesia Evaluation     Patient summary reviewed and Nursing notes reviewed   NPO Solid Status: > 8 hours  NPO Liquid Status: > 2 hours           Airway   Mallampati: II  TM distance: >3 FB  Neck ROM: full  No difficulty expected  Dental - normal exam     Pulmonary - normal exam    breath sounds clear to auscultation  (+) a smoker Former, shortness of breath,   Cardiovascular - normal exam    ECG reviewed  Rhythm: regular  Rate: normal    (+) hypertension 2 medications or greater, pericardial effusion,     ROS comment: EF 59%, LVH, pericardial effusion by ECHO 2/23    Neuro/Psych  (+) seizures poorly controlled, headaches,      ROS Comment: Last seizure 1/23/hx migraines  GI/Hepatic/Renal/Endo    (+)   liver disease history of elevated LFT, renal disease ESRD and dialysis,     ROS Comment: Anasarca    Musculoskeletal     Abdominal  - normal exam   Substance History   (+) drug use      Comment: Marijuana   OB/GYN          Other   autoimmune disease lupus, blood dyscrasia thrombocytopenia,         Phys Exam Other: Tunnel dialysis cath right chest              Anesthesia Plan    ASA 3     general     intravenous induction     Anesthetic plan, risks, benefits, and alternatives have been provided, discussed and informed consent has been obtained with: patient.    Plan discussed with CRNA.        CODE STATUS:

## 2023-04-03 NOTE — ANESTHESIA PROCEDURE NOTES
Airway  Urgency: elective    Date/Time: 4/3/2023 1:29 PM  Airway not difficult    General Information and Staff    Patient location during procedure: OR  Anesthesiologist: Norm Jimenes MD  CRNA/CAA: Esthela Estevez CRNA    Indications and Patient Condition  Indications for airway management: airway protection    Preoxygenated: yes  MILS maintained throughout  Mask difficulty assessment: 2 - vent by mask + OA or adjuvant +/- NMBA    Final Airway Details  Final airway type: endotracheal airway      Successful airway: ETT  Cuffed: yes   Successful intubation technique: direct laryngoscopy  Facilitating devices/methods: intubating stylet and cricoid pressure  Endotracheal tube insertion site: oral  Blade: Trinh  Blade size: 2  ETT size (mm): 7.0  Cormack-Lehane Classification: grade I - full view of glottis  Placement verified by: chest auscultation and capnometry   Cuff volume (mL): 7  Measured from: teeth  ETT/EBT  to teeth (cm): 21  Number of attempts at approach: 1  Assessment: lips, teeth, and gum same as pre-op and atraumatic intubation

## 2023-04-03 NOTE — ADDENDUM NOTE
Addendum  created 04/03/23 1721 by Norm Jimenes MD    Attestation recorded in Intraprocedure, Clinical Note Signed, Intraprocedure Attestations filed

## 2023-04-03 NOTE — OP NOTE
DATE OF PROCEDURE: 4/3/2023    SURGEON: Jemal Loyola MD     ASSISTANT: Hamzah Calero CSA dosing charge for retraction, exposure, closing wounds and placing dressings that was essential for the success of the case    PREOPERATIVE DIAGNOSES:   1.  End-stage kidney disease in need of peritoneal dialysis.     POSTOPERATIVE DIAGNOSES:   1.  End-stage kidney disease in need of peritoneal dialysis.     PROCEDURES PERFORMED:   1. Laparoscopic insertion of [place] peritoneal dialysis catheter.   2.  Laparoscopic omentopexy    ANESTHESIA: General.   ESTIMATED BLOOD LOSS: Minimal.   SPECIMEN: None.   IMPLANT: [place] peritoneal dialysis catheter.   FINDINGS: Good catheter flow.  Redundant omentum  COMPLICATIONS: None.     INDICATIONS FOR PROCEDURE: The patient is a very pleasant 32 y.o. year old female with end-stage kidney disease in need of peritoneal dialysis.  The patient was evaluated for peritoneal dialysis and was found to be good candidate. I offered the patient laparoscopic peritoneal dialysis catheter placement. The risks and benefits of the procedure were explained to the patient. The patient verbalized understanding and agreed with the plan.     DESCRIPTION OF PROCEDURE: The patient was taken to the operating room and was placed on the OR table in the supine position. Preoperative antibiotics were given and SCD's were placed. General endotracheal anesthesia was then induced. The patient's abdomen was then prepped and draped in the usual sterile fashion. Time out was performed and the patient was correctly identified. I started the procedure by injecting 0.5% Marcaine with epinephrine in the left upper abdomen. With optiview technique a 5 mm trocar was introduced in the patient abdomen.  I then proceeded to insufflate patient's abdomen again, and upon exploration of the abdominal cavity there was no injury from the trocar placement.  The omentum was redundant and close to the pelvis inlet so I decided to perform  an omentopexy.  I placed another 5 mm trocar in the left lower quadrant under direct laparoscopic vision. After this, 0.5% Marcaine was injected in the left  periumbilical area and an incision was performed. An 8 mm trocar was then placed in that position at 60°. It was pointed towards the pelvis. An [place] coiled catheter was then placed through the trocar and the trocar was removed. It was positioned in the retrovesical area. The first cuff was withdrawn back to the level of the rectus muscle fascia. The catheter was then tunneled and an exit site was selected in the left upper quadrant.  I then brought up the omentum to the right upper quadrant and a small stab incision was performed in the right upper quadrant.  With Endo Close and 0 silk I perform an omentopexy by passing with Endo Close several times through the omentum the suture.  The suture was then regrasped by the Endo Close device and brought out through the abdominal wall.  The suture was tied and the omentum was secured in place in the anterior abdominal wall.    I then proceeded to desufflate the pneumoperitoneum and let 500 mL of heparinized saline run. The fluid flowed without any resistance. The catheter was placed then to gravity and good flow of the fluid was found and 300 mL of heparinized saline was retrieved. The catheter was then capped and pneumoperitoneum was again insufflated. Upon exploration of the abdominal cavity, there was no evidence of injury from the procedure.I then proceeded to remove all the trocars under direct laparoscopic vision. The catheter was then flushed with 25 mL of heparinized saline without any resistance to the flow. All the incisions were then closed in 2 layers with 3-0 Vicryl and 4-0 Monocryl. The catheter was secured in place with Steri-Strips. A biopatch was placed around the exit site.  All the incisions were covered with 4 x 4 and Tegaderm. The patient was awakened in the operating room and was taken to the  recovery area in stable condition.     Jemal Loyola M.D.

## 2023-04-03 NOTE — ANESTHESIA POSTPROCEDURE EVALUATION
"Patient: Dee Herrera    Procedure Summary     Date: 04/03/23 Room / Location: [facility] OR [address] [place] [address] MAIN OR    Anesthesia Start: 1315 Anesthesia Stop: 1432    Procedure: INSERTION PERITONEAL DIALYSIS CATHETER LAPAROSCOPIC, omentumpexy (Abdomen) Diagnosis:       ESRD on hemodialysis      (ESRD on hemodialysis (HCC) [N18.6, Z99.2])    Surgeons: Jemal Loyola MD Provider: Norm Jimenes MD    Anesthesia Type: general ASA Status: 3          Anesthesia Type: general    Vitals  Vitals Value Taken Time   /114 04/03/23 1616   Temp 37.1 °C (98.8 °F) 04/03/23 1426   Pulse 87 04/03/23 1630   Resp 16 04/03/23 1630   SpO2 87 % 04/03/23 1630   Vitals shown include unvalidated device data.        Post Anesthesia Care and Evaluation    Patient location during evaluation: PACU  Patient participation: complete - patient participated  Level of consciousness: awake  Pain management: adequate    Airway patency: patent  Anesthetic complications: No anesthetic complications    Cardiovascular status: acceptable  Respiratory status: acceptable  Hydration status: acceptable    Comments: BP (!) 146/111   Pulse 81   Temp 37.1 °C (98.8 °F) (Oral)   Resp 16   Ht 165.1 cm (65\")   Wt 52.6 kg (116 lb)   LMP 07/22/2022   SpO2 95%   BMI 19.30 kg/m²         "

## 2023-04-05 ENCOUNTER — OFFICE VISIT (OUTPATIENT)
Dept: GASTROENTEROLOGY | Facility: CLINIC | Age: 33
End: 2023-04-05
Payer: MEDICAID

## 2023-04-05 ENCOUNTER — TELEPHONE (OUTPATIENT)
Dept: SURGERY | Facility: CLINIC | Age: 33
End: 2023-04-05
Payer: MEDICAID

## 2023-04-05 VITALS
HEIGHT: 65 IN | DIASTOLIC BLOOD PRESSURE: 90 MMHG | OXYGEN SATURATION: 98 % | SYSTOLIC BLOOD PRESSURE: 147 MMHG | BODY MASS INDEX: 21.02 KG/M2 | TEMPERATURE: 97.8 F | WEIGHT: 126.2 LBS | HEART RATE: 87 BPM

## 2023-04-05 DIAGNOSIS — R19.8 ALTERNATING CONSTIPATION AND DIARRHEA: ICD-10-CM

## 2023-04-05 DIAGNOSIS — R10.9 ABDOMINAL PAIN, UNSPECIFIED ABDOMINAL LOCATION: ICD-10-CM

## 2023-04-05 DIAGNOSIS — K76.1 CARDIAC CIRRHOSIS: Primary | ICD-10-CM

## 2023-04-05 PROCEDURE — 3080F DIAST BP >= 90 MM HG: CPT | Performed by: INTERNAL MEDICINE

## 2023-04-05 PROCEDURE — 99214 OFFICE O/P EST MOD 30 MIN: CPT | Performed by: INTERNAL MEDICINE

## 2023-04-05 PROCEDURE — 3077F SYST BP >= 140 MM HG: CPT | Performed by: INTERNAL MEDICINE

## 2023-04-05 RX ORDER — ACETAMINOPHEN 160 MG/5ML
650 SUSPENSION, ORAL (FINAL DOSE FORM) ORAL
COMMUNITY
Start: 2023-01-10 | End: 2024-01-09

## 2023-04-05 NOTE — PROGRESS NOTES
"Chief Complaint   Patient presents with   • Abdominal Pain       History of Present Illness:   32 y.o. female        Dr. Orion Corey sent me a note in 2 of 2023 that said:    \"Drew, I got a patient that has renal failure and is on dialysis.  She was having severe abdominal pain and a CT of the abdomen pelvis was obtained.  She has marked thickening of the duodenal loop with 8 mm concern for duodenitis and small bowel enteritis.  She also has possible acute pancreatitis and or pancreatic phlegmon.  She also has a diagnosis of lupus and I have ordered duplex mesenteric artery scan to look for atherosclerotic or vasculitis.  I asked that she be referred to you, could you possibly take a look at her.  Her name is Dee Herrera MR# [id number] .  She is only 33 years old.\"       I saw the patient in the office in 2 of 2023.  My assessment and plan was as follows:  Assessment:  1. Pancytopenia  2. 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 HTN  4. H/o seizure disorder (felt to be from poorly controlled HTN?)  5. H/o abdominal pain  6. Abnormal CT abd with evidence of \"cardiac cirrhosis\".  7. Abnormal CT abd with \"Mild acute pancreatitis with peripancreatic phlegmon.  No pseudocyst or calcifications.\" She is not a drinker of much ETOH. She has sludge in her GB on CT abd.  8. Could the \"duodenitis\" be from the pancreatitis with some involvement of the adjacent duodenum?  9, Diarrhea  10.  Weight loss.  11. Poorly controlled BP. She has been out of meds x 1 day and says she will  her HTN meds today. 180/120 rechecked with P82.  12.      Recommendations:  1. EGD.  I will evaluate the \"duodenitis\" and to look for varices given her \"cirrhosis\" seen on CT abd/pelvis.  We will hold off on the EGD till her BP is better controlled.   2. US of the gallbladder and liver, to see if gallbladder disease could be the source of her previous pancreatitis? And to see if there could be any liver masses given her \"cirrhosis\"?  3. " Should she be seen by Hematology to evaluate her pancytopenia?  I will defer this question to Dr. Orion Corey?  4. Labs: Hep profile, ceruloplasmin, AMA, GIANFRANCO (looking for other causes of cirrhosis?), AFP, etc.  5. Stool studies.  6. Get records from her last hospital admission (@ )  7. I think she should go to the ER to get her BP down today.   8. I will be curious what her repeat CT abd shows (to be done at [facility] this week).  9. F/u 3 weeks.        Dr. JUAN Cotton saw her 2/14/23 (the day after I saw her) and he said:  Impression:  HTN  ESRD on HD  SLE  Chronic Diffuse Abdominal Pain  Diarrhea  Cirrhotic appearing liver on Imaging  Abnormal Imaging of Pancreas in November suggestive of pancreatitis  Ansasarca  Thrombocytopenia     Plan:  - CT abd/pelvis at  in November that noted marked wall thickening of duodenal and jejunal loop of small bowel, marked cardiac cirrhosis with sludge in GB, no ben dil, mild acute pancreatitis with peripancreatic phlegmon, small left effusion and marked pericardial effusion, anasarca, reactve inguinal LAD likely post infectious or inflammatory  - Repeat CT abd/pelvis w contrast   - Infectious stool studies with c diff and GI PCR  - Check some of the labs as outlined in Dr. Kaminski note for AM: GIANFRANCO, AMA. ASMA, Ig profile, TTG IgA, ceruloplasmin, acute hep panel, A1AT       She was last seen when she was an inpatient in 2 of 2023 and the nurse practitioner that saw her put in her note:  Assessment:  1. Diffuse chronic abdominal pain  2. ESRD on HD, follows with  for possible transplant  3. SLE  4. Hypertension  5. Diarrhea-resolved.  C. difficile toxin PCR positive, antigen negative suggestive of colonization  6. Abnormal imaging of the pancreas suggestive of pancreatitis but clinical symptoms do not match.  Serum lipase level normal at 22 on 2/15/2023.  7. CT findings of possible gastric wall thickening/gastritis  8. Splenomegaly  9. Mildly elevated AST at 42  10. Ceruloplasmin at  17     Plan:  • Continue oral vancomycin for C. difficile per primary team.  • Ceruloplasmin low at 17, we will order a serum copper level.  As patient produces very little urine, there would not be benefit from 24-hour urine copper collection.  • AMA, SMA negative  • GIANFRANCO positive-given patient's history of SLE  • Alpha 1 antitrypsin level still pending  • Patient reports occasional epigastric discomfort, but nothing significant.  She does not have a history of GERD per her report.  Discussion was held with patient regarding findings on imaging of possible gastric wall thickening/possible gastritis.  Patient amenable to outpatient follow-up with GI for EGD.  • Likely sign off tomorrow as long as serum copper level is normal and would recommend outpatient follow-up with GI with possible EGD.       It sounds like her abdominal pain was from clostridium dificile colitis.       She has some abd pain since the PD catheter was placed 2 days ago. Rare nausea. No diarrhea, she gets constipated sometimes. NO rectal bleeding or melena.    Past Medical History:   Diagnosis Date   • Anasarca     PER CT SCAN   • Dry skin    • ESRD (end stage renal disease) on dialysis     TUES, THURS, SAT [address]   • History of abdominal pain    • History of anemia    • History of transfusion    • Hypertension    • Iron deficiency anemia 09/27/2021   • Lupus (systemic lupus erythematosus) 07/30/2022   • Migraine    • Other specified nutritional anemias    • Pericardial effusion    • Seizures     STATES LAST WAS 1/2023   • Shortness of breath     OCCASIONAL   • Vitamin D deficiency 09/27/2021       Past Surgical History:   Procedure Laterality Date   • INSERTION HEMODIALYSIS CATHETER N/A 07/26/2022    Procedure: RIGHT TUNNELED DIALYSIS CATHETER PLACEMENT;  Surgeon: Diandra Adhikari MD;  Location: [facility];  Service: Vascular;  Laterality: N/A;   • INSERTION PERITONEAL DIALYSIS CATHETER N/A 4/3/2023    Procedure: INSERTION  PERITONEAL DIALYSIS CATHETER LAPAROSCOPIC, omentumpexy;  Surgeon: Jemal Loyola MD;  Location: [facility] MAIN OR;  Service: General;  Laterality: N/A;   • TONSILLECTOMY           Current Outpatient Medications:   •  acetaminophen (TYLENOL) 160 MG/5ML suspension, Take 20.3 mL by mouth., Disp: , Rfl:   •  carvedilol (COREG) 25 MG tablet, Take 1 tablet by mouth Every 12 (Twelve) Hours., Disp: 60 tablet, Rfl: 0  •  famotidine (PEPCID) 20 MG tablet, Take 1 tablet by mouth Daily., Disp: 30 tablet, Rfl: 0  •  hydrALAZINE (APRESOLINE) 25 MG tablet, Take 1 tablet by mouth 3 (Three) Times a Day., Disp: , Rfl:   •  HYDROcodone-acetaminophen (NORCO) 5-325 MG per tablet, Take 1 tablet by mouth Every 6 (Six) Hours As Needed (Pain)., Disp: 20 tablet, Rfl: 0  •  hydroxychloroquine (PLAQUENIL) 200 MG tablet, Take 1 tablet by mouth Daily., Disp: , Rfl:   •  lacosamide (VIMPAT) 50 MG tablet tablet, Take 1 tablet by mouth Every 12 (Twelve) Hours., Disp: 60 tablet, Rfl: 0  •  mycophenolate (CELLCEPT) 500 MG tablet, Take 0.5 tablets by mouth Every 12 (Twelve) Hours., Disp: 30 tablet, Rfl: 0  •  NIFEdipine XL (PROCARDIA XL) 90 MG 24 hr tablet, Take 1 tablet by mouth Daily., Disp: 30 tablet, Rfl: 0  •  ondansetron (Zofran) 4 MG tablet, Take 1 tablet by mouth Every 8 (Eight) Hours As Needed for Nausea or Vomiting., Disp: 30 tablet, Rfl: 1  •  predniSONE (DELTASONE) 10 MG tablet, Take 1 tablet by mouth Daily., Disp: 30 tablet, Rfl: 0  •  sennosides-docusate (PERICOLACE) 8.6-50 MG per tablet, Take 2 tablets by mouth Daily As Needed for Constipation., Disp: 30 tablet, Rfl: 1  •  simethicone (MYLICON) 80 MG chewable tablet, Chew 1 tablet Every 6 (Six) Hours As Needed., Disp: , Rfl:     Allergies   Allergen Reactions   • Minoxidil Other (See Comments)     Pericardial effusion .       Family History   Problem Relation Age of Onset   • Autoimmune disease Mother    • Anemia Mother    • Diabetes Sister    • Anemia Brother    • Diabetes  Maternal Grandmother    • Hypertension Maternal Grandmother    • Cancer Maternal Grandmother    • Sickle cell anemia Cousin    • Malig Hyperthermia Neg Hx        Social History     Socioeconomic History   • Marital status: Single   Tobacco Use   • Smoking status: Former     Types: Cigars   • Smokeless tobacco: Never   • Tobacco comments:     Patient smoked black & mild   Vaping Use   • Vaping Use: Never used   Substance and Sexual Activity   • Alcohol use: Yes     Comment: social   • Drug use: Yes     Types: Marijuana     Comment: OCCASIONAL   • Sexual activity: Not Currently     Partners: Male     Birth control/protection: None       Review of Systems   Gastrointestinal: Negative for abdominal pain.   All other systems reviewed and are negative.    Pertinent positives and negatives documented in the HPI and all other systems reviewed and were found to be negative.  Vitals:    04/05/23 1533   BP: 147/90   Pulse: 87   Temp: 97.8 °F (36.6 °C)   SpO2: 98%       Physical Exam  Vitals reviewed.   Constitutional:       General: She is not in acute distress.     Appearance: Normal appearance. She is well-developed. She is not diaphoretic.   HENT:      Head: Normocephalic and atraumatic. Hair is normal.      Right Ear: Hearing, tympanic membrane, ear canal and external ear normal. No decreased hearing noted. No drainage.      Left Ear: Hearing, tympanic membrane, ear canal and external ear normal. No decreased hearing noted.      Nose: Nose normal. No nasal deformity.      Mouth/Throat:      Mouth: Mucous membranes are moist.   Eyes:      General: Lids are normal.         Right eye: No discharge.         Left eye: No discharge.      Extraocular Movements: Extraocular movements intact.      Conjunctiva/sclera: Conjunctivae normal.      Pupils: Pupils are equal, round, and reactive to light.   Neck:      Thyroid: No thyromegaly.      Vascular: No JVD.      Trachea: No tracheal deviation.   Cardiovascular:      Rate and  Rhythm: Normal rate and regular rhythm.      Pulses: Normal pulses.      Heart sounds: Normal heart sounds. No murmur heard.    No friction rub. No gallop.   Pulmonary:      Effort: Pulmonary effort is normal. No respiratory distress.      Breath sounds: Normal breath sounds. No wheezing or rales.   Chest:      Chest wall: No tenderness.   Abdominal:      General: Bowel sounds are normal. There is no distension.      Palpations: Abdomen is soft. There is no mass.      Tenderness: There is no abdominal tenderness. There is no guarding or rebound.      Hernia: No hernia is present.   Musculoskeletal:         General: No tenderness or deformity. Normal range of motion.      Cervical back: Normal range of motion and neck supple.   Lymphadenopathy:      Cervical: No cervical adenopathy.   Skin:     General: Skin is warm and dry.      Findings: No erythema or rash.   Neurological:      Mental Status: She is alert and oriented to person, place, and time.      Cranial Nerves: No cranial nerve deficit.      Motor: No abnormal muscle tone.      Coordination: Coordination normal.      Deep Tendon Reflexes: Reflexes are normal and symmetric. Reflexes normal.   Psychiatric:         Mood and Affect: Mood normal.         Behavior: Behavior normal.         Thought Content: Thought content normal.         Judgment: Judgment normal.         Diagnoses and all orders for this visit:    1. Cardiac cirrhosis (Primary)  -     Case Request; Standing  -     Case Request    2. Abdominal pain, unspecified abdominal location  -     US Gallbladder; Future  -     Case Request; Standing  -     Case Request    3. Alternating constipation and diarrhea  -     Case Request; Standing  -     Case Request    Other orders  -     Follow Anesthesia Guidelines / Protocol; Future  -     Obtain Informed Consent; Future  -     Implement Anesthesia orders day of procedure.; Standing  -     Obtain informed consent; Standing  -     Verify bowel prep was  "successful; Standing  -     Give tap water enema if bowel prep was insufficient; Standing      Assessment:  1. H/o \"cardiac cirrhosis\" seen on CT abd, though it was not seen on the last CT abd done last month at [facility].   2. Alternating diarrhea and constiaption  3. H//o abdominal pain in the past with one CT abd showing possible pancreatitis.   4. She is being evaluated for a possible kidney transplant.  5. Hemodialysis patient.   6.     Recommendations:  1. EGD + colonoscopy   2. US of the gallbladder  3. Could we get a CBC one week before the EGD and colonoscopy to make sure that her platelets are high enough to be able to tolerate an EGD and colonoscopy?    No follow-ups on file.    Drew Kaminski MD  4/5/2023  "

## 2023-04-05 NOTE — Clinical Note
Please schedule her to have an EGD and colonoscopy by me to be done in the next 4 weeks. I would like to get a CBC one week before the scopes are scheduled to make sure that her platelets are high enough to be able to tolerate an EGD and colonoscopy? Please tell the patient that I would like to do this CBC one week before the scopes.  Thx.kjh

## 2023-04-05 NOTE — LETTER
"April 5, 2023     Bobby Corey MD  [address] [facility] [address]    Patient: Dee Herrera   YOB: 1990   Date of Visit: 4/5/2023       Dear Dr. Leora MD:    Thank you for referring Dee Herrera to me for evaluation. Below are the relevant portions of my assessment and plan of care.    If you have questions, please do not hesitate to call me. I look forward to following Dee along with you.         Sincerely,        Drew Kaminski MD        CC: No Recipients    Drew Kaminski MD  04/05/23 1734  Incomplete  Chief Complaint   Patient presents with   • Abdominal Pain       History of Present Illness:   32 y.o. female        Dr. Orion Corey sent me a note in 2 of 2023 that said:    \"Drew, I got a patient that has renal failure and is on dialysis.  She was having severe abdominal pain and a CT of the abdomen pelvis was obtained.  She has marked thickening of the duodenal loop with 8 mm concern for duodenitis and small bowel enteritis.  She also has possible acute pancreatitis and or pancreatic phlegmon.  She also has a diagnosis of lupus and I have ordered duplex mesenteric artery scan to look for atherosclerotic or vasculitis.  I asked that she be referred to you, could you possibly take a look at her.  Her name is Dee Herrera MR# [id number] .  She is only 33 years old.\"       I saw the patient in the office in 2 of 2023.  My assessment and plan was as follows:  Assessment:  1. Pancytopenia  2. 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 HTN  4. H/o seizure disorder (felt to be from poorly controlled HTN?)  5. H/o abdominal pain  6. Abnormal CT abd with evidence of \"cardiac cirrhosis\".  7. Abnormal CT abd with \"Mild acute pancreatitis with peripancreatic phlegmon.  No pseudocyst or calcifications.\" She is not a drinker of much ETOH. She has sludge in her GB on CT abd.  8. Could the \"duodenitis\" be from the pancreatitis with some involvement of the adjacent duodenum?  9, " "Diarrhea  10.  Weight loss.  11. Poorly controlled BP. She has been out of meds x 1 day and says she will  her HTN meds today. 180/120 rechecked with P82.  12.      Recommendations:  1. EGD.  I will evaluate the \"duodenitis\" and to look for varices given her \"cirrhosis\" seen on CT abd/pelvis.  We will hold off on the EGD till her BP is better controlled.   2. US of the gallbladder and liver, to see if gallbladder disease could be the source of her previous pancreatitis? And to see if there could be any liver masses given her \"cirrhosis\"?  3. Should she be seen by Hematology to evaluate her pancytopenia?  I will defer this question to Dr. Orion Corey?  4. Labs: Hep profile, ceruloplasmin, AMA, GIANFRANCO (looking for other causes of cirrhosis?), AFP, etc.  5. Stool studies.  6. Get records from her last hospital admission (@ )  7. I think she should go to the ER to get her BP down today.   8. I will be curious what her repeat CT abd shows (to be done at [facility] this week).  9. F/u 3 weeks.        Dr. JUAN Cotton saw her 2/14/23 (the day after I saw her) and he said:  Impression:  HTN  ESRD on HD  SLE  Chronic Diffuse Abdominal Pain  Diarrhea  Cirrhotic appearing liver on Imaging  Abnormal Imaging of Pancreas in November suggestive of pancreatitis  Ansasarca  Thrombocytopenia     Plan:  - CT abd/pelvis at  in November that noted marked wall thickening of duodenal and jejunal loop of small bowel, marked cardiac cirrhosis with sludge in GB, no ben dil, mild acute pancreatitis with peripancreatic phlegmon, small left effusion and marked pericardial effusion, anasarca, reactve inguinal LAD likely post infectious or inflammatory  - Repeat CT abd/pelvis w contrast   - Infectious stool studies with c diff and GI PCR  - Check some of the labs as outlined in Dr. Kaminski note for AM: GIANFRANCO, AMA. ASMA, Ig profile, TTG IgA, ceruloplasmin, acute hep panel, A1AT       She was last seen when she was an inpatient in 2 of 2023 and the " nurse practitioner that saw her put in her note:  Assessment:  Diffuse chronic abdominal pain  ESRD on HD, follows with [facility] for possible transplant  SLE  Hypertension  Diarrhea-resolved.  C. difficile toxin PCR positive, antigen negative suggestive of colonization  Abnormal imaging of the pancreas suggestive of pancreatitis but clinical symptoms do not match.  Serum lipase level normal at 22 on 2/15/2023.  CT findings of possible gastric wall thickening/gastritis  Splenomegaly  Mildly elevated AST at 42  Ceruloplasmin at 17     Plan:  Continue oral vancomycin for C. difficile per primary team.  Ceruloplasmin low at 17, we will order a serum copper level.  As patient produces very little urine, there would not be benefit from 24-hour urine copper collection.  AMA, SMA negative  GIANFRANCO positive-given patient's history of SLE  Alpha 1 antitrypsin level still pending  Patient reports occasional epigastric discomfort, but nothing significant.  She does not have a history of GERD per her report.  Discussion was held with patient regarding findings on imaging of possible gastric wall thickening/possible gastritis.  Patient amenable to outpatient follow-up with GI for EGD.  Likely sign off tomorrow as long as serum copper level is normal and would recommend outpatient follow-up with GI with possible EGD.       It sounds like her abdominal pain was from clostridium dificile colitis.       She has some abd pain since the PD catheter was placed 2 days ago. Rare nausea. No diarrhea, she gets constipated sometimes. NO rectal bleeding or melena.    Past Medical History:   Diagnosis Date   • Anasarca     PER CT SCAN   • Dry skin    • ESRD (end stage renal disease) on dialysis     MARCO COLE, KATIE FRASER   • History of abdominal pain    • History of anemia    • History of transfusion    • Hypertension    • Iron deficiency anemia 09/27/2021   • Lupus (systemic lupus erythematosus) 07/30/2022   • Migraine    • Other  specified nutritional anemias    • Pericardial effusion    • Seizures     STATES LAST WAS 1/2023   • Shortness of breath     OCCASIONAL   • Vitamin D deficiency 09/27/2021       Past Surgical History:   Procedure Laterality Date   • INSERTION HEMODIALYSIS CATHETER N/A 07/26/2022    Procedure: RIGHT TUNNELED DIALYSIS CATHETER PLACEMENT;  Surgeon: Diandra Adhikari MD;  Location: [facility] MAIN OR;  Service: Vascular;  Laterality: N/A;   • INSERTION PERITONEAL DIALYSIS CATHETER N/A 4/3/2023    Procedure: INSERTION PERITONEAL DIALYSIS CATHETER LAPAROSCOPIC, omentumpexy;  Surgeon: Jemal Loyola MD;  Location: [facility] MAIN OR;  Service: General;  Laterality: N/A;   • TONSILLECTOMY           Current Outpatient Medications:   •  acetaminophen (TYLENOL) 160 MG/5ML suspension, Take 20.3 mL by mouth., Disp: , Rfl:   •  carvedilol (COREG) 25 MG tablet, Take 1 tablet by mouth Every 12 (Twelve) Hours., Disp: 60 tablet, Rfl: 0  •  famotidine (PEPCID) 20 MG tablet, Take 1 tablet by mouth Daily., Disp: 30 tablet, Rfl: 0  •  hydrALAZINE (APRESOLINE) 25 MG tablet, Take 1 tablet by mouth 3 (Three) Times a Day., Disp: , Rfl:   •  HYDROcodone-acetaminophen (NORCO) 5-325 MG per tablet, Take 1 tablet by mouth Every 6 (Six) Hours As Needed (Pain)., Disp: 20 tablet, Rfl: 0  •  hydroxychloroquine (PLAQUENIL) 200 MG tablet, Take 1 tablet by mouth Daily., Disp: , Rfl:   •  lacosamide (VIMPAT) 50 MG tablet tablet, Take 1 tablet by mouth Every 12 (Twelve) Hours., Disp: 60 tablet, Rfl: 0  •  mycophenolate (CELLCEPT) 500 MG tablet, Take 0.5 tablets by mouth Every 12 (Twelve) Hours., Disp: 30 tablet, Rfl: 0  •  NIFEdipine XL (PROCARDIA XL) 90 MG 24 hr tablet, Take 1 tablet by mouth Daily., Disp: 30 tablet, Rfl: 0  •  ondansetron (Zofran) 4 MG tablet, Take 1 tablet by mouth Every 8 (Eight) Hours As Needed for Nausea or Vomiting., Disp: 30 tablet, Rfl: 1  •  predniSONE (DELTASONE) 10 MG tablet, Take 1 tablet by mouth Daily., Disp: 30  tablet, Rfl: 0  •  sennosides-docusate (PERICOLACE) 8.6-50 MG per tablet, Take 2 tablets by mouth Daily As Needed for Constipation., Disp: 30 tablet, Rfl: 1  •  simethicone (MYLICON) 80 MG chewable tablet, Chew 1 tablet Every 6 (Six) Hours As Needed., Disp: , Rfl:     Allergies   Allergen Reactions   • Minoxidil Other (See Comments)     Pericardial effusion .       Family History   Problem Relation Age of Onset   • Autoimmune disease Mother    • Anemia Mother    • Diabetes Sister    • Anemia Brother    • Diabetes Maternal Grandmother    • Hypertension Maternal Grandmother    • Cancer Maternal Grandmother    • Sickle cell anemia Cousin    • Malig Hyperthermia Neg Hx        Social History     Socioeconomic History   • Marital status: Single   Tobacco Use   • Smoking status: Former     Types: Cigars   • Smokeless tobacco: Never   • Tobacco comments:     Patient smoked black & mild   Vaping Use   • Vaping Use: Never used   Substance and Sexual Activity   • Alcohol use: Yes     Comment: social   • Drug use: Yes     Types: Marijuana     Comment: OCCASIONAL   • Sexual activity: Not Currently     Partners: Male     Birth control/protection: None       Review of Systems   Gastrointestinal: Negative for abdominal pain.   All other systems reviewed and are negative.    Pertinent positives and negatives documented in the HPI and all other systems reviewed and were found to be negative.  Vitals:    04/05/23 1533   BP: 147/90   Pulse: 87   Temp: 97.8 °F (36.6 °C)   SpO2: 98%       Physical Exam  Vitals reviewed.   Constitutional:       General: She is not in acute distress.     Appearance: Normal appearance. She is well-developed. She is not diaphoretic.   HENT:      Head: Normocephalic and atraumatic. Hair is normal.      Right Ear: Hearing, tympanic membrane, ear canal and external ear normal. No decreased hearing noted. No drainage.      Left Ear: Hearing, tympanic membrane, ear canal and external ear normal. No decreased  hearing noted.      Nose: Nose normal. No nasal deformity.      Mouth/Throat:      Mouth: Mucous membranes are moist.   Eyes:      General: Lids are normal.         Right eye: No discharge.         Left eye: No discharge.      Extraocular Movements: Extraocular movements intact.      Conjunctiva/sclera: Conjunctivae normal.      Pupils: Pupils are equal, round, and reactive to light.   Neck:      Thyroid: No thyromegaly.      Vascular: No JVD.      Trachea: No tracheal deviation.   Cardiovascular:      Rate and Rhythm: Normal rate and regular rhythm.      Pulses: Normal pulses.      Heart sounds: Normal heart sounds. No murmur heard.    No friction rub. No gallop.   Pulmonary:      Effort: Pulmonary effort is normal. No respiratory distress.      Breath sounds: Normal breath sounds. No wheezing or rales.   Chest:      Chest wall: No tenderness.   Abdominal:      General: Bowel sounds are normal. There is no distension.      Palpations: Abdomen is soft. There is no mass.      Tenderness: There is no abdominal tenderness. There is no guarding or rebound.      Hernia: No hernia is present.   Musculoskeletal:         General: No tenderness or deformity. Normal range of motion.      Cervical back: Normal range of motion and neck supple.   Lymphadenopathy:      Cervical: No cervical adenopathy.   Skin:     General: Skin is warm and dry.      Findings: No erythema or rash.   Neurological:      Mental Status: She is alert and oriented to person, place, and time.      Cranial Nerves: No cranial nerve deficit.      Motor: No abnormal muscle tone.      Coordination: Coordination normal.      Deep Tendon Reflexes: Reflexes are normal and symmetric. Reflexes normal.   Psychiatric:         Mood and Affect: Mood normal.         Behavior: Behavior normal.         Thought Content: Thought content normal.         Judgment: Judgment normal.         Diagnoses and all orders for this visit:    1. Cardiac cirrhosis (Primary)  -     Case  "Request; Standing  -     Case Request    2. Abdominal pain, unspecified abdominal location  -     US Gallbladder; Future  -     Case Request; Standing  -     Case Request    3. Alternating constipation and diarrhea  -     Case Request; Standing  -     Case Request    Other orders  -     Follow Anesthesia Guidelines / Protocol; Future  -     Obtain Informed Consent; Future  -     Implement Anesthesia orders day of procedure.; Standing  -     Obtain informed consent; Standing  -     Verify bowel prep was successful; Standing  -     Give tap water enema if bowel prep was insufficient; Standing      Assessment:  1. H/o \"cardiac cirrhosis\" seen on CT abd, though it was not seen on the last CT abd done last month at [facility].   2. Alternating diarrhea and constiaption  3. H//o abdominal pain in the past with one CT abd showing possible pancreatitis.   4. She is being evaluated for a possible kidney transplant.  5. Hemodialysis patient.   6.     Recommendations:  1. EGD + colonoscopy   2. US of the gallbladder  3. Could we get a CBC one week before the EGD and colonoscopy to make sure that her platelets are high enough to be able to tolerate an EGD and colonoscopy?    No follow-ups on file.    Drew Kaminski MD  4/5/2023    Drew Kaminski MD  04/05/23 1642  Signed  Chief Complaint   Patient presents with   • Abdominal Pain       History of Present Illness:   32 y.o. female        Dr. Orion Corey sent me a note in 2 of 2023 that said:  Dr. Orion Corey sent me a note that said:  \"Drew, I got a patient that has renal failure and is on dialysis.  She was having severe abdominal pain and a CT of the abdomen pelvis was obtained.  She has marked thickening of the duodenal loop with 8 mm concern for duodenitis and small bowel enteritis.  She also has possible acute pancreatitis and or pancreatic phlegmon.  She also has a diagnosis of lupus and I have ordered duplex mesenteric artery scan to look for atherosclerotic or vasculitis.  I asked " "that she be referred to you, could you possibly take a look at her.  Her name is Dee Herrera MR# [id number] .  She is only 33 years old.\"       I saw the patient in the office in 2 of 2023.  My assessment and plan was as follows:  Assessment:  1. Pancytopenia  2. 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 HTN  4. H/o seizure disorder (felt to be from poorly controlled HTN?)  5. H/o abdominal pain  6. Abnormal CT abd with evidence of \"cardiac cirrhosis\".  7. Abnormal CT abd with \"Mild acute pancreatitis with peripancreatic phlegmon.  No pseudocyst or calcifications.\" She is not a drinker of much ETOH. She has sludge in her GB on CT abd.  8. Could the \"duodenitis\" be from the pancreatitis with some involvement of the adjacent duodenum?  9, Diarrhea  10.  Weight loss.  11. Poorly controlled BP. She has been out of meds x 1 day and says she will  her HTN meds today. 180/120 rechecked with P82.  12.      Recommendations:  1. EGD.  I will evaluate the \"duodenitis\" and to look for varices given her \"cirrhosis\" seen on CT abd/pelvis.  We will hold off on the EGD till her BP is better controlled.   2. US of the gallbladder and liver, to see if gallbladder disease could be the source of her previous pancreatitis? And to see if there could be any liver masses given her \"cirrhosis\"?  3. Should she be seen by Hematology to evaluate her pancytopenia?  I will defer this question to Dr. Orion Corey?  4. Labs: Hep profile, ceruloplasmin, AMA, GIANFRANCO (looking for other causes of cirrhosis?), AFP, etc.  5. Stool studies.  6. Get records from her last hospital admission (@ )  7. I think she should go to the ER to get her BP down today.   8. I will be curious what her repeat CT abd shows (to be done at [facility] this week).  9. F/u 3 weeks.        Dr. JUAN Cotton saw her 2/14/23 (the day after I saw her) and he said:  Impression:  HTN  ESRD on HD  SLE  Chronic Diffuse Abdominal Pain  Diarrhea  Cirrhotic appearing liver on " Imaging  Abnormal Imaging of Pancreas in November suggestive of pancreatitis  Ansasarca  Thrombocytopenia     Plan:  - CT abd/pelvis at [facility] in November that noted marked wall thickening of duodenal and jejunal loop of small bowel, marked cardiac cirrhosis with sludge in GB, no ben dil, mild acute pancreatitis with peripancreatic phlegmon, small left effusion and marked pericardial effusion, anasarca, reactve inguinal LAD likely post infectious or inflammatory  - Repeat CT abd/pelvis w contrast   - Infectious stool studies with c diff and GI PCR  - Check some of the labs as outlined in Dr. Kaminski note for AM: GIANFRANCO, AMA. ASMA, Ig profile, TTG IgA, ceruloplasmin, acute hep panel, A1AT       She was last seen when she was an inpatient in 2 of 2023 and the nurse practitioner that saw her put in her note:  Assessment:  Diffuse chronic abdominal pain  ESRD on HD, follows with [facility] for possible transplant  SLE  Hypertension  Diarrhea-resolved.  C. difficile toxin PCR positive, antigen negative suggestive of colonization  Abnormal imaging of the pancreas suggestive of pancreatitis but clinical symptoms do not match.  Serum lipase level normal at 22 on 2/15/2023.  CT findings of possible gastric wall thickening/gastritis  Splenomegaly  Mildly elevated AST at 42  Ceruloplasmin at 17     These problems are new to me.  Plan:  Continue oral vancomycin for C. difficile per primary team.  Ceruloplasmin low at 17, we will order a serum copper level.  As patient produces very little urine, there would not be benefit from 24-hour urine copper collection.  AMA, SMA negative  GIANFRANCO positive-given patient's history of SLE  Alpha 1 antitrypsin level still pending  Patient reports occasional epigastric discomfort, but nothing significant.  She does not have a history of GERD per her report.  Discussion was held with patient regarding findings on imaging of possible gastric wall thickening/possible gastritis.  Patient amenable to outpatient  follow-up with GI for EGD.  Likely sign off tomorrow as long as serum copper level is normal and would recommend outpatient follow-up with GI with possible EGD.       It sounds like her abdominal pain was from clostridium dificile colitis.       She has some abd pain since the PD catheter was placed 2 days ago. Rare nausea. No diarrhea, she gets constipated sometimes. NO rectal bleeding or melena.    Past Medical History:   Diagnosis Date   • Anasarca     PER CT SCAN   • Dry skin    • ESRD (end stage renal disease) on dialysis     [address]CO [address]LE [address]   • History of abdominal pain    • History of anemia    • History of transfusion    • Hypertension    • Iron deficiency anemia 09/27/2021   • Lupus (systemic lupus erythematosus) 07/30/2022   • Migraine    • Other specified nutritional anemias    • Pericardial effusion    • Seizures     STATES LAST WAS 1/2023   • Shortness of breath     OCCASIONAL   • Vitamin D deficiency 09/27/2021       Past Surgical History:   Procedure Laterality Date   • INSERTION HEMODIALYSIS CATHETER N/A 07/26/2022    Procedure: RIGHT TUNNELED DIALYSIS CATHETER PLACEMENT;  Surgeon: Diandra Adhikari MD;  Location: [facility];  Service: Vascular;  Laterality: N/A;   • INSERTION PERITONEAL DIALYSIS CATHETER N/A 4/3/2023    Procedure: INSERTION PERITONEAL DIALYSIS CATHETER LAPAROSCOPIC, omentumpexy;  Surgeon: Jemal Loyola MD;  Location: [facility];  Service: General;  Laterality: N/A;   • TONSILLECTOMY           Current Outpatient Medications:   •  acetaminophen (TYLENOL) 160 MG/5ML suspension, Take 20.3 mL by mouth., Disp: , Rfl:   •  carvedilol (COREG) 25 MG tablet, Take 1 tablet by mouth Every 12 (Twelve) Hours., Disp: 60 tablet, Rfl: 0  •  famotidine (PEPCID) 20 MG tablet, Take 1 tablet by mouth Daily., Disp: 30 tablet, Rfl: 0  •  hydrALAZINE (APRESOLINE) 25 MG tablet, Take 1 tablet by mouth 3 (Three) Times a Day., Disp: , Rfl:   •   HYDROcodone-acetaminophen (NORCO) 5-325 MG per tablet, Take 1 tablet by mouth Every 6 (Six) Hours As Needed (Pain)., Disp: 20 tablet, Rfl: 0  •  hydroxychloroquine (PLAQUENIL) 200 MG tablet, Take 1 tablet by mouth Daily., Disp: , Rfl:   •  lacosamide (VIMPAT) 50 MG tablet tablet, Take 1 tablet by mouth Every 12 (Twelve) Hours., Disp: 60 tablet, Rfl: 0  •  mycophenolate (CELLCEPT) 500 MG tablet, Take 0.5 tablets by mouth Every 12 (Twelve) Hours., Disp: 30 tablet, Rfl: 0  •  NIFEdipine XL (PROCARDIA XL) 90 MG 24 hr tablet, Take 1 tablet by mouth Daily., Disp: 30 tablet, Rfl: 0  •  ondansetron (Zofran) 4 MG tablet, Take 1 tablet by mouth Every 8 (Eight) Hours As Needed for Nausea or Vomiting., Disp: 30 tablet, Rfl: 1  •  predniSONE (DELTASONE) 10 MG tablet, Take 1 tablet by mouth Daily., Disp: 30 tablet, Rfl: 0  •  sennosides-docusate (PERICOLACE) 8.6-50 MG per tablet, Take 2 tablets by mouth Daily As Needed for Constipation., Disp: 30 tablet, Rfl: 1  •  simethicone (MYLICON) 80 MG chewable tablet, Chew 1 tablet Every 6 (Six) Hours As Needed., Disp: , Rfl:     Allergies   Allergen Reactions   • Minoxidil Other (See Comments)     Pericardial effusion .       Family History   Problem Relation Age of Onset   • Autoimmune disease Mother    • Anemia Mother    • Diabetes Sister    • Anemia Brother    • Diabetes Maternal Grandmother    • Hypertension Maternal Grandmother    • Cancer Maternal Grandmother    • Sickle cell anemia Cousin    • Malig Hyperthermia Neg Hx        Social History     Socioeconomic History   • Marital status: Single   Tobacco Use   • Smoking status: Former     Types: Cigars   • Smokeless tobacco: Never   • Tobacco comments:     Patient smoked black & mild   Vaping Use   • Vaping Use: Never used   Substance and Sexual Activity   • Alcohol use: Yes     Comment: social   • Drug use: Yes     Types: Marijuana     Comment: OCCASIONAL   • Sexual activity: Not Currently     Partners: Male     Birth  control/protection: None       Review of Systems   Gastrointestinal: Negative for abdominal pain.   All other systems reviewed and are negative.    Pertinent positives and negatives documented in the HPI and all other systems reviewed and were found to be negative.  Vitals:    04/05/23 1533   BP: 147/90   Pulse: 87   Temp: 97.8 °F (36.6 °C)   SpO2: 98%       Physical Exam  Vitals reviewed.   Constitutional:       General: She is not in acute distress.     Appearance: Normal appearance. She is well-developed. She is not diaphoretic.   HENT:      Head: Normocephalic and atraumatic. Hair is normal.      Right Ear: Hearing, tympanic membrane, ear canal and external ear normal. No decreased hearing noted. No drainage.      Left Ear: Hearing, tympanic membrane, ear canal and external ear normal. No decreased hearing noted.      Nose: Nose normal. No nasal deformity.      Mouth/Throat:      Mouth: Mucous membranes are moist.   Eyes:      General: Lids are normal.         Right eye: No discharge.         Left eye: No discharge.      Extraocular Movements: Extraocular movements intact.      Conjunctiva/sclera: Conjunctivae normal.      Pupils: Pupils are equal, round, and reactive to light.   Neck:      Thyroid: No thyromegaly.      Vascular: No JVD.      Trachea: No tracheal deviation.   Cardiovascular:      Rate and Rhythm: Normal rate and regular rhythm.      Pulses: Normal pulses.      Heart sounds: Normal heart sounds. No murmur heard.    No friction rub. No gallop.   Pulmonary:      Effort: Pulmonary effort is normal. No respiratory distress.      Breath sounds: Normal breath sounds. No wheezing or rales.   Chest:      Chest wall: No tenderness.   Abdominal:      General: Bowel sounds are normal. There is no distension.      Palpations: Abdomen is soft. There is no mass.      Tenderness: There is no abdominal tenderness. There is no guarding or rebound.      Hernia: No hernia is present.   Musculoskeletal:          "General: No tenderness or deformity. Normal range of motion.      Cervical back: Normal range of motion and neck supple.   Lymphadenopathy:      Cervical: No cervical adenopathy.   Skin:     General: Skin is warm and dry.      Findings: No erythema or rash.   Neurological:      Mental Status: She is alert and oriented to person, place, and time.      Cranial Nerves: No cranial nerve deficit.      Motor: No abnormal muscle tone.      Coordination: Coordination normal.      Deep Tendon Reflexes: Reflexes are normal and symmetric. Reflexes normal.   Psychiatric:         Mood and Affect: Mood normal.         Behavior: Behavior normal.         Thought Content: Thought content normal.         Judgment: Judgment normal.         Diagnoses and all orders for this visit:    1. Cardiac cirrhosis (Primary)  -     Case Request; Standing  -     Case Request    2. Abdominal pain, unspecified abdominal location  -     US Gallbladder; Future  -     Case Request; Standing  -     Case Request    3. Alternating constipation and diarrhea  -     Case Request; Standing  -     Case Request    Other orders  -     Follow Anesthesia Guidelines / Protocol; Future  -     Obtain Informed Consent; Future  -     Implement Anesthesia orders day of procedure.; Standing  -     Obtain informed consent; Standing  -     Verify bowel prep was successful; Standing  -     Give tap water enema if bowel prep was insufficient; Standing      Assessment:  1. H/o \"cardiac cirrhosis\" seen on CT abd, though it was not seen on the last CT abd done last month at [facility].   2. Alternating diarrhea and constiaption  3. H//o abdominal pain in the past.    Recommendations:  1. EGD + colonoscopy   2. US of the gallbladder    No follow-ups on file.    Drew Kaminski MD  4/5/2023  "

## 2023-04-05 NOTE — TELEPHONE ENCOUNTER
Patient would like to know if you can call in something stronger for the pain. Please advise   RN was informed by pt that she can not take insulin, she has a reaction that causes amnesia  RN sent tiger text to SLIM MD will await further orders  No dose required at this time    Call bell in reach  [facility]

## 2023-04-06 NOTE — TELEPHONE ENCOUNTER
Patient left a voice mail saying she tried as Dr. Loyola instructed and has been taking 1.5 tablets every 6 hours and has not had relief for pain, she only becomes sleepy.  She request a call back.

## 2023-04-06 NOTE — TELEPHONE ENCOUNTER
Spoke with patient and advised she could take one pain pill every 4 hours or she can take plain tylenol in between doses of pain medication.  Gave patient example if she took pain medication at noon, she could take plain tylenol at 3 pm, and then her next pain pill would be at 6 pm.  She voiced understanding.  Patient said she will try using plain tylenol

## 2023-04-11 ENCOUNTER — TELEPHONE (OUTPATIENT)
Dept: FAMILY MEDICINE CLINIC | Facility: CLINIC | Age: 33
End: 2023-04-11
Payer: MEDICAID

## 2023-04-11 NOTE — TELEPHONE ENCOUNTER
Caller: TRINIDADDANYELL    Relationship: Mother    Best call back number: [phone]    What is the best time to reach you: ANY    Who are you requesting to speak with (clinical staff, provider,  specific staff member): CLINICAL    Do you know the name of the person who called: DANYELL PATIENTS MOTHER    What was the call regarding: PATIENT IS HAVING ISSUES BREATHING AT NIGHT AND DANYELL STATED THAT SHE IS COUGHING AND THROWING UP AND IT SEEMS TO BE ELEVATED AT NIGHT.    Do you require a callback: YES

## 2023-04-12 ENCOUNTER — TELEPHONE (OUTPATIENT)
Dept: CARDIOLOGY | Facility: CLINIC | Age: 33
End: 2023-04-12
Payer: MEDICAID

## 2023-04-12 NOTE — TELEPHONE ENCOUNTER
Patient stated that she had a breathing test and a few others done , don't remember name or when exactly when she had them done .

## 2023-04-12 NOTE — TELEPHONE ENCOUNTER
Returned patient's phone call regarding wanting to discuss test results. I do not see any testing that was ordered by Dr. Taylor that has not been discussed with patient. A PYP scan was ordered in February however this was put on hold due to a hospitalization and never rescheduled. Will look into this further. Also, she recently had a stress test at [facility] as a part of workup for her renal transplant and says this was not discussed with her. I reviewed the telephone encounters and it looks like she was contacted by the transplant team in March regarding the abnormal results and she was made aware that a copy of the test was faxed to Dr. Taylor for further discussion at her follow up on March 24.  She was a no show for this appointment.     I am going to contact our schedulers to set up the PYP scan, hopefully prior to her appointment with Dr. Taylor on 4/25. She says her symptoms are stable at this time and she does not feel she needs to be seen sooner.

## 2023-04-12 NOTE — TELEPHONE ENCOUNTER
Patient called trying to get her lab results read .    Patient states that she hasn't heard back on her results and she is still having trouble breathing at night.     Please  wants to know what she needs to do , Patient also has appt 4/25/23 is this okay or do she needs to come in sooner ?    Please advise Patient is expecting a call back and reached at [phone].

## 2023-04-13 ENCOUNTER — TELEPHONE (OUTPATIENT)
Dept: GASTROENTEROLOGY | Facility: CLINIC | Age: 33
End: 2023-04-13
Payer: MEDICAID

## 2023-04-21 ENCOUNTER — HOSPITAL ENCOUNTER (OUTPATIENT)
Dept: CARDIOLOGY | Facility: HOSPITAL | Age: 33
Discharge: HOME | End: 2023-04-21
Payer: MEDICAID

## 2023-05-12 ENCOUNTER — HOSPITAL ENCOUNTER (OUTPATIENT)
Dept: ULTRASOUND IMAGING | Facility: HOSPITAL | Age: 33
Discharge: HOME | End: 2023-05-12
Payer: MEDICAID

## 2023-05-17 ENCOUNTER — OFFICE VISIT (OUTPATIENT)
Dept: FAMILY MEDICINE CLINIC | Facility: CLINIC | Age: 33
End: 2023-05-17
Payer: MEDICAID

## 2023-05-17 VITALS
WEIGHT: 111.8 LBS | DIASTOLIC BLOOD PRESSURE: 58 MMHG | SYSTOLIC BLOOD PRESSURE: 148 MMHG | HEART RATE: 73 BPM | BODY MASS INDEX: 18.63 KG/M2 | TEMPERATURE: 98.3 F | RESPIRATION RATE: 16 BRPM | HEIGHT: 65 IN | OXYGEN SATURATION: 99 %

## 2023-05-17 DIAGNOSIS — Z00.00 NORMAL FEMALE BREAST EXAM: ICD-10-CM

## 2023-05-17 DIAGNOSIS — Z01.419 ENCOUNTER FOR GYNECOLOGICAL EXAMINATION: Primary | ICD-10-CM

## 2023-05-17 DIAGNOSIS — B96.89 BACTERIAL VAGINOSIS: Primary | ICD-10-CM

## 2023-05-17 DIAGNOSIS — Z12.4 SCREENING FOR MALIGNANT NEOPLASM OF THE CERVIX: ICD-10-CM

## 2023-05-17 DIAGNOSIS — N76.0 BACTERIAL VAGINOSIS: Primary | ICD-10-CM

## 2023-05-17 NOTE — PROGRESS NOTES
"Chief Complaint  Annual Exam (Pap smear )    Subjective          Dee Herrera presents to [facility] for  History of Present Illness  She is, new to me, requesting to have a cervical cancer (PAP) screening and manual breast exam today.  She denies having breast lumps, discharge, skin changes or pain.  She denies needing STI screening today.  She denies any other complaints today.          Review of Systems   Respiratory: Negative for shortness of breath.    Cardiovascular: Negative for chest pain and palpitations.   Genitourinary: Positive for enuresis. Negative for genital sores, hematuria, menstrual problem, pelvic pain, vaginal bleeding, vaginal discharge and vaginal pain.        She does not urinate; she is on home peritoneal dialysis.  She has not had menstrual cycles since diagnosed with ESRD 7/2022.  She has not been sexually active in several years.  She denies breast abnormalities.   Skin: Negative for color change.   Neurological: Negative for dizziness and light-headedness.     Objective   Vital Signs:   /58 (BP Location: Left arm, Patient Position: Sitting, Cuff Size: Adult)   Pulse 73   Temp 98.3 °F (36.8 °C) (Oral)   Resp 16   Ht 165.1 cm (65\")   Wt 50.7 kg (111 lb 12.8 oz)   SpO2 99%   BMI 18.60 kg/m²     Physical Exam  Vitals and nursing note reviewed. Exam conducted with a chaperone present (Cheyenne MCPHERSON).   Constitutional:       General: She is not in acute distress.     Appearance: Normal appearance. She is normal weight.   HENT:      Head: Normocephalic and atraumatic.   Eyes:      Conjunctiva/sclera: Conjunctivae normal.   Cardiovascular:      Rate and Rhythm: Normal rate and regular rhythm.   Pulmonary:      Effort: No respiratory distress.      Breath sounds: Normal breath sounds.   Chest:   Breasts:     Khang Score is 5.      Breasts are symmetrical.      Right: Normal.      Left: Normal.   Genitourinary:     General: Normal vulva.      Exam " position: Lithotomy position.      Pubic Area: No rash or pubic lice.       Khang stage (genital): 5.      Labia:         Right: No rash, tenderness, lesion or injury.         Left: No rash, tenderness, lesion or injury.       Urethra: No prolapse, urethral pain, urethral swelling or urethral lesion.      Vagina: No signs of injury and foreign body. Vaginal discharge present. No erythema, tenderness, bleeding, lesions or prolapsed vaginal walls.      Cervix: Eversion present. No cervical motion tenderness, discharge, friability, lesion, erythema or cervical bleeding.      Uterus: Normal.       Adnexa: Right adnexa normal and left adnexa normal.      Comments: Scan amount cream colored vaginal discharge outside  cervix.    Lymphadenopathy:      Upper Body:      Right upper body: No supraclavicular, axillary or pectoral adenopathy.      Left upper body: No supraclavicular, axillary or pectoral adenopathy.   Neurological:      Mental Status: She is alert.        Result Review :                 Assessment and Plan    Diagnoses and all orders for this visit:    1. Encounter for gynecological examination (Primary)    2. Screening for malignant neoplasm of the cervix  -     PAP, Liquid Based ([facility] Only)    3. Normal female breast exam        Follow Up   Return F/U PRN per patient request.  Patient was given instructions and counseling regarding her condition or for health maintenance advice. Please see specific information pulled into the AVS if appropriate.

## 2023-05-18 ENCOUNTER — PATIENT ROUNDING (BHMG ONLY) (OUTPATIENT)
Dept: FAMILY MEDICINE CLINIC | Facility: CLINIC | Age: 33
End: 2023-05-18
Payer: MEDICAID

## 2023-05-18 NOTE — PROGRESS NOTES
A [employer] message has been sent to patient rounding with [facility].

## 2023-05-23 ENCOUNTER — TELEPHONE (OUTPATIENT)
Dept: GASTROENTEROLOGY | Facility: CLINIC | Age: 33
End: 2023-05-23
Payer: MEDICAID

## 2023-05-23 PROBLEM — R19.8 ALTERNATING CONSTIPATION AND DIARRHEA: Status: ACTIVE | Noted: 2023-05-23

## 2023-05-23 NOTE — TELEPHONE ENCOUNTER
WOODROW meyer  for colonoscopy on  11/14/2023 arrive at 930AM   . Mailed Prep instructions to Mailing address on-file. ----[employer]

## 2023-05-24 LAB
CYTOLOGIST CVX/VAG CYTO: NORMAL
CYTOLOGY CVX/VAG DOC CYTO: NORMAL
DX ICD CODE: NORMAL
HIV 1 & 2 AB SER-IMP: NORMAL
OTHER STN SPEC: NORMAL
STAT OF ADQ CVX/VAG CYTO-IMP: NORMAL

## 2023-05-24 RX ORDER — METRONIDAZOLE 500 MG/1
500 TABLET ORAL 2 TIMES DAILY
Qty: 14 TABLET | Refills: 0 | Status: SHIPPED | OUTPATIENT
Start: 2023-05-24

## 2023-05-26 ENCOUNTER — OFFICE VISIT (OUTPATIENT)
Dept: CARDIOLOGY | Facility: CLINIC | Age: 33
End: 2023-05-26
Payer: MEDICAID

## 2023-05-26 VITALS
HEIGHT: 65 IN | WEIGHT: 127.2 LBS | DIASTOLIC BLOOD PRESSURE: 78 MMHG | OXYGEN SATURATION: 97 % | HEART RATE: 70 BPM | BODY MASS INDEX: 21.19 KG/M2 | SYSTOLIC BLOOD PRESSURE: 138 MMHG

## 2023-05-26 DIAGNOSIS — Z99.2 PERITONEAL DIALYSIS CATHETER IN PLACE: ICD-10-CM

## 2023-05-26 DIAGNOSIS — I31.39 PERICARDIAL EFFUSION: ICD-10-CM

## 2023-05-26 DIAGNOSIS — N18.6 ESRD (END STAGE RENAL DISEASE): ICD-10-CM

## 2023-05-26 DIAGNOSIS — R94.39 ABNORMAL STRESS TEST: Primary | ICD-10-CM

## 2023-05-26 DIAGNOSIS — M32.9 SYSTEMIC LUPUS ERYTHEMATOSUS, UNSPECIFIED SLE TYPE, UNSPECIFIED ORGAN INVOLVEMENT STATUS: ICD-10-CM

## 2023-05-26 DIAGNOSIS — I51.7 LVH (LEFT VENTRICULAR HYPERTROPHY): ICD-10-CM

## 2023-05-26 DIAGNOSIS — I10 ESSENTIAL HYPERTENSION: ICD-10-CM

## 2023-05-26 PROBLEM — I16.0 HYPERTENSIVE URGENCY: Status: RESOLVED | Noted: 2022-07-20 | Resolved: 2023-05-26

## 2023-05-26 PROBLEM — E85.9 AMYLOID DISEASE: Status: RESOLVED | Noted: 2023-02-03 | Resolved: 2023-05-26

## 2023-05-26 PROBLEM — R79.89 ELEVATED TROPONIN: Status: RESOLVED | Noted: 2022-07-20 | Resolved: 2023-05-26

## 2023-05-26 PROBLEM — R77.8 ELEVATED TROPONIN: Status: RESOLVED | Noted: 2022-07-20 | Resolved: 2023-05-26

## 2023-05-26 PROBLEM — I16.0 HYPERTENSIVE URGENCY: Status: RESOLVED | Noted: 2023-02-14 | Resolved: 2023-05-26

## 2023-05-26 RX ORDER — POLYETHYLENE GLYCOL 3350 17 G/17G
POWDER, FOR SOLUTION ORAL
COMMUNITY
Start: 2023-05-23

## 2023-05-26 NOTE — LETTER
May 26, 2023       No Recipients    Patient: Dee Herrera   YOB: 1990   Date of Visit: 5/26/2023       Dear CAESAR Vee    Dee Herrera was in my office today. Below is a copy of my note.    If you have questions, please do not hesitate to call me. I look forward to following Dee along with you.         Sincerely,        Juana Taylor MD        CC:   No Recipients        Subjective:     Encounter Date:05/26/2023     Patient ID: Dee Herrera is a 33 y.o. female.    Chief Complaint:  History of Present Illness      ROS      Current Outpatient Medications:   •  acetaminophen (TYLENOL) 160 MG/5ML suspension, Take 20.3 mL by mouth., Disp: , Rfl:   •  carvedilol (COREG) 25 MG tablet, Take 1 tablet by mouth Every 12 (Twelve) Hours., Disp: 60 tablet, Rfl: 0  •  famotidine (PEPCID) 20 MG tablet, Take 1 tablet by mouth Daily., Disp: 30 tablet, Rfl: 0  •  HYDROcodone-acetaminophen (NORCO) 5-325 MG per tablet, Take 1 tablet by mouth Every 6 (Six) Hours As Needed (Pain)., Disp: 20 tablet, Rfl: 0  •  hydroxychloroquine (PLAQUENIL) 200 MG tablet, Take 1 tablet by mouth Daily., Disp: , Rfl:   •  metroNIDAZOLE (Flagyl) 500 MG tablet, Take 1 tablet by mouth 2 (Two) Times a Day., Disp: 14 tablet, Rfl: 0  •  mycophenolate (CELLCEPT) 500 MG tablet, Take 0.5 tablets by mouth Every 12 (Twelve) Hours., Disp: 30 tablet, Rfl: 0  •  NIFEdipine XL (PROCARDIA XL) 90 MG 24 hr tablet, Take 1 tablet by mouth Daily., Disp: 30 tablet, Rfl: 0  •  ondansetron (Zofran) 4 MG tablet, Take 1 tablet by mouth Every 8 (Eight) Hours As Needed for Nausea or Vomiting., Disp: 30 tablet, Rfl: 1  •  polyethylene glycol (MiraLax) 17 GM/SCOOP powder, Take  by mouth., Disp: , Rfl:   •  predniSONE (DELTASONE) 10 MG tablet, Take 1 tablet by mouth Daily., Disp: 30 tablet, Rfl: 0  •  sennosides-docusate (PERICOLACE) 8.6-50 MG per tablet, Take 2 tablets by mouth Daily As Needed for Constipation., Disp: 30 tablet, Rfl: 1  •   simethicone (MYLICON) 80 MG chewable tablet, Chew 1 tablet Every 6 (Six) Hours As Needed., Disp: , Rfl:     Past Medical History:   Diagnosis Date   • Anasarca     PER CT SCAN   • Dry skin    • ESRD (end stage renal disease) on dialysis     MARCO COLE, SAT UMAIR FRASER   • History of abdominal pain    • History of anemia    • History of transfusion    • Hypertension    • Iron deficiency anemia 09/27/2021   • Lupus (systemic lupus erythematosus) 07/30/2022   • Migraine    • Other specified nutritional anemias    • Pericardial effusion    • Renal insufficiency    • Seizures     STATES LAST WAS 1/2023   • Shortness of breath     OCCASIONAL   • Vitamin D deficiency 09/27/2021       Past Surgical History:   Procedure Laterality Date   • INSERTION HEMODIALYSIS CATHETER N/A 07/26/2022    Procedure: RIGHT TUNNELED DIALYSIS CATHETER PLACEMENT;  Surgeon: Diandra Adhikari MD;  Location: [facility] OR;  Service: Vascular;  Laterality: N/A;   • INSERTION PERITONEAL DIALYSIS CATHETER N/A 4/3/2023    Procedure: INSERTION PERITONEAL DIALYSIS CATHETER LAPAROSCOPIC, omentumpexy;  Surgeon: Jemal Loyola MD;  [facility] MAIN OR;  Service: General;  Laterality: N/A;   • TONSILLECTOMY         Family History   Problem Relation Age of Onset   • Autoimmune disease Mother    • Anemia Mother    • Diabetes Sister    • Anemia Brother    • Diabetes Maternal Grandmother    • Hypertension Maternal Grandmother    • Cancer Maternal Grandmother    • Sickle cell anemia Cousin    • Malig Hyperthermia Neg Hx        Social History     Tobacco Use   • Smoking status: Former     Types: Cigars     Passive exposure: Past   • Smokeless tobacco: Never   • Tobacco comments:     Patient smoked black & mild   Vaping Use   • Vaping Use: Never used   Substance Use Topics   • Alcohol use: Yes     Comment: social   • Drug use: Yes     Types: Marijuana     Comment: OCCASIONAL       Procedures      Objective:     Visit Vitals  /78  "(BP Location: Right arm, Patient Position: Sitting, Cuff Size: Adult)   Pulse 70   Ht 165.1 cm (65\")   Wt 57.7 kg (127 lb 3.2 oz)   SpO2 97%   BMI 21.17 kg/m²        Physical Exam    Lab Review:      Assessment:         Diagnosis Plan   1. Abnormal stress test        2. Essential hypertension        3. Pericardial effusion        4. Systemic lupus erythematosus, unspecified SLE type, unspecified organ involvement status        5. ESRD (end stage renal disease)        6. Peritoneal dialysis catheter in place              Plan:              "

## 2023-05-26 NOTE — LETTER
May 26, 2023       No Recipients    Patient: Dee Herrera   YOB: 1990   Date of Visit: 5/26/2023       Dear CAESAR Vee    Dee Herrera was in my office today. Below is a copy of my note.    If you have questions, please do not hesitate to call me. I look forward to following Dee along with you.         Sincerely,        Juana Taylor MD        CC:   No Recipients        Subjective:     Encounter Date:05/26/2023     Patient ID: Dee Herrera is a 33 y.o. female.    Chief Complaint:  History of Present Illness      ROS      Current Outpatient Medications:   •  acetaminophen (TYLENOL) 160 MG/5ML suspension, Take 20.3 mL by mouth., Disp: , Rfl:   •  carvedilol (COREG) 25 MG tablet, Take 1 tablet by mouth Every 12 (Twelve) Hours., Disp: 60 tablet, Rfl: 0  •  famotidine (PEPCID) 20 MG tablet, Take 1 tablet by mouth Daily., Disp: 30 tablet, Rfl: 0  •  HYDROcodone-acetaminophen (NORCO) 5-325 MG per tablet, Take 1 tablet by mouth Every 6 (Six) Hours As Needed (Pain)., Disp: 20 tablet, Rfl: 0  •  hydroxychloroquine (PLAQUENIL) 200 MG tablet, Take 1 tablet by mouth Daily., Disp: , Rfl:   •  metroNIDAZOLE (Flagyl) 500 MG tablet, Take 1 tablet by mouth 2 (Two) Times a Day., Disp: 14 tablet, Rfl: 0  •  mycophenolate (CELLCEPT) 500 MG tablet, Take 0.5 tablets by mouth Every 12 (Twelve) Hours., Disp: 30 tablet, Rfl: 0  •  NIFEdipine XL (PROCARDIA XL) 90 MG 24 hr tablet, Take 1 tablet by mouth Daily., Disp: 30 tablet, Rfl: 0  •  ondansetron (Zofran) 4 MG tablet, Take 1 tablet by mouth Every 8 (Eight) Hours As Needed for Nausea or Vomiting., Disp: 30 tablet, Rfl: 1  •  polyethylene glycol (MiraLax) 17 GM/SCOOP powder, Take  by mouth., Disp: , Rfl:   •  predniSONE (DELTASONE) 10 MG tablet, Take 1 tablet by mouth Daily., Disp: 30 tablet, Rfl: 0  •  sennosides-docusate (PERICOLACE) 8.6-50 MG per tablet, Take 2 tablets by mouth Daily As Needed for Constipation., Disp: 30 tablet, Rfl: 1  •   simethicone (MYLICON) 80 MG chewable tablet, Chew 1 tablet Every 6 (Six) Hours As Needed., Disp: , Rfl:     Past Medical History:   Diagnosis Date   • Anasarca     PER CT SCAN   • Dry skin    • ESRD (end stage renal disease) on dialysis     MARCO COLE, SAT UMAIR FRASER   • History of abdominal pain    • History of anemia    • History of transfusion    • Hypertension    • Iron deficiency anemia 09/27/2021   • Lupus (systemic lupus erythematosus) 07/30/2022   • Migraine    • Other specified nutritional anemias    • Pericardial effusion    • Renal insufficiency    • Seizures     STATES LAST WAS 1/2023   • Shortness of breath     OCCASIONAL   • Vitamin D deficiency 09/27/2021       Past Surgical History:   Procedure Laterality Date   • INSERTION HEMODIALYSIS CATHETER N/A 07/26/2022    Procedure: RIGHT TUNNELED DIALYSIS CATHETER PLACEMENT;  Surgeon: Diandra Adhikari MD;  Location: [facility] OR;  Service: Vascular;  Laterality: N/A;   • INSERTION PERITONEAL DIALYSIS CATHETER N/A 4/3/2023    Procedure: INSERTION PERITONEAL DIALYSIS CATHETER LAPAROSCOPIC, omentumpexy;  Surgeon: Jemal Loyola MD;  Location: [facility] MAIN OR;  Service: General;  Laterality: N/A;   • TONSILLECTOMY         Family History   Problem Relation Age of Onset   • Autoimmune disease Mother    • Anemia Mother    • Diabetes Sister    • Anemia Brother    • Diabetes Maternal Grandmother    • Hypertension Maternal Grandmother    • Cancer Maternal Grandmother    • Sickle cell anemia Cousin    • Malig Hyperthermia Neg Hx        Social History     Tobacco Use   • Smoking status: Former     Types: Cigars     Passive exposure: Past   • Smokeless tobacco: Never   • Tobacco comments:     Patient smoked black & mild   Vaping Use   • Vaping Use: Never used   Substance Use Topics   • Alcohol use: Yes     Comment: social   • Drug use: Yes     Types: Marijuana     Comment: OCCASIONAL       Procedures      Objective:     Visit Vitals  /78  "(BP Location: Right arm, Patient Position: Sitting, Cuff Size: Adult)   Pulse 70   Ht 165.1 cm (65\")   Wt 57.7 kg (127 lb 3.2 oz)   SpO2 97%   BMI 21.17 kg/m²        Physical Exam    Lab Review:      Assessment:         Diagnosis Plan   1. Abnormal stress test        2. Essential hypertension        3. Pericardial effusion        4. Systemic lupus erythematosus, unspecified SLE type, unspecified organ involvement status        5. ESRD (end stage renal disease)        6. Peritoneal dialysis catheter in place              Plan:              "

## 2023-05-26 NOTE — PROGRESS NOTES
"    Subjective:     Encounter Date:05/26/2023      Patient ID: Dee Herrera is a 33 y.o. female.    Chief Complaint:  History of Present Illness    This is a 33-year-old with ESRD on dialysis, hypertension, systemic lupus erythematosus, pericardial effusion, left ventricular hypertrophy, cirrhosis, who presents for follow up.      The patient reported that she was diagnosed with end-stage renal disease, systemic lupus, and hypertension all about 6 months ago in 7/2022.  She has been on dialysis since that time through a tunneled dialysis catheter.  She reported that she had been in good health until about a month prior to that admission.  At the time there were plans to proceed with AV fistula placement eventually.  She was also followed by rheumatology for her lupus and was on treatment with CellCept and prednisone.  There is also plans to resume hydroxychloroquine.  It was felt that her lupus was responsible for her renal disease.  She was also planning on undergoing renal transplant evaluation with [facility].       Since starting with dialysis she began to struggle with significant nausea and vomiting.  As part of her work-up she underwent a CT of the abdomen and pelvis in 11/2022 at [facility].  This reported findings concerning for duodenitis and small bowel enteritis, a large pericardial effusion, moderate right and small left pleural effusion, soft tissue anasarca, reactive bilateral inguinal lymphadenopathy, and reportedly findings consistent with \"cardiac cirrhosis\".    Based on these findings Dr. Corey recommended an echocardiogram and referral to our office.    I saw the patient initially in 2/2023.  She underwent an echocardiogram the same day which showed severe left ventricular hypertrophy, with homogeneously speckled and echo bright myocardium concerning for infiltrative cardiomyopathy such as amyloid versus due to longstanding renal disease, normal left ventricular " systolic function with an EF of 64%, longitudinal LV strain of -15%, mildly dilated left ventricle, mildly dilated left atrium, increased left atrial pressure, insufficient TR velocity for RVSP measurement, and a small to moderate pericardial effusion most prominent posteriorly with a small amount anteriorly without any evidence of tamponade.     At that office visit she was still Struggling with nausea and vomiting.  She reported ongoing but stable dyspnea on exertion.  She reported a couple of episodes of chest discomfort a couple months prior but nothing recent.  She denies any family history of heart disease.  At that office visit I recommended proceeding with a PYP scan to rule out amyloidosis as a cause of her left ventricular hypertrophy.  I also sent an SPEP for further evaluation.  UPEP was not ordered because the patient was not producing urine at the time.    Shortly after that office visit she was admitted to the hospital on 2/13/2023 with elevated blood pressures.  During that hospitalization she also reported symptoms of shortness of breath and chest heaviness.  She underwent a repeat echocardiogram on 2/14/2023 which showed normal left ventricular systolic function with an EF of 59%, severe concentric left ventricular hypertrophy, grade 2 diastolic dysfunction, and a moderate to large pericardial effusion that is was large posteriorly but no evidence of tamponade, and elevated left atrial pressure.  Since that the patient appeared to be loculated and there was no clinical evidence of hemodynamic compromise from the effusion no intervention was recommended.  Her blood pressures improved with titration of her medications.    Since that hospitalization the patient was evaluated [facility] renal transplant center at [facility].  They set her up for a stress test and a repeat echocardiogram.  These were performed on 3/13/2023.  Stress test showed evidence of inferolateral infarct with small  amount of sheila-infarct ischemia.  Echocardiogram continue to show normal left ventricular systolic function with an EF of 55 to 60%, severe concentric left ventricular hypertrophy, small posterior pericardial effusion, and moderate pulmonary hypertension with an right ventricular systolic pressure 47 mmHg.  The reports were sent to me and I plan to discuss it further with the patient's follow-up in March but unfortunately she missed that appointment.    It appears that she was admitted to [facility] in April.  Does not appear that she required any cardiac testing during that admission.  Following that admission she was transition to peritoneal dialysis.    The patient indicates that she started peritoneal dialysis her blood pressures have been much better controlled.  She has been having intermittent episodes of palpitations but this is chronic and unchanged.  She continues to have issues with shortness of breath both at rest especially when she is laying flat and with activity.  She also reports episodes of chest heaviness often with activity.  She reports lightheadedness with position changes.  She denies any lower extremity swelling.      Review of Systems   Constitutional: Positive for malaise/fatigue.   HENT: Negative for hearing loss, hoarse voice, nosebleeds and sore throat.    Eyes: Negative for pain.   Cardiovascular: Positive for chest pain, dyspnea on exertion and palpitations. Negative for claudication, cyanosis, irregular heartbeat, leg swelling, near-syncope, orthopnea, paroxysmal nocturnal dyspnea and syncope.   Respiratory: Negative for shortness of breath and snoring.    Endocrine: Negative for cold intolerance, heat intolerance, polydipsia, polyphagia and polyuria.   Skin: Negative for itching and rash.   Musculoskeletal: Negative for arthritis, falls, joint pain, joint swelling, muscle cramps, muscle weakness and myalgias.   Gastrointestinal: Negative for constipation, diarrhea, dysphagia,  heartburn, hematemesis, hematochezia, melena, nausea and vomiting.   Genitourinary: Negative for frequency, hematuria and hesitancy.   Neurological: Positive for light-headedness. Negative for excessive daytime sleepiness, dizziness, headaches, numbness and weakness.   Psychiatric/Behavioral: Negative for depression. The patient is not nervous/anxious.          Current Outpatient Medications:   •  acetaminophen (TYLENOL) 160 MG/5ML suspension, Take 20.3 mL by mouth., Disp: , Rfl:   •  carvedilol (COREG) 25 MG tablet, Take 1 tablet by mouth Every 12 (Twelve) Hours., Disp: 60 tablet, Rfl: 0  •  famotidine (PEPCID) 20 MG tablet, Take 1 tablet by mouth Daily., Disp: 30 tablet, Rfl: 0  •  HYDROcodone-acetaminophen (NORCO) 5-325 MG per tablet, Take 1 tablet by mouth Every 6 (Six) Hours As Needed (Pain)., Disp: 20 tablet, Rfl: 0  •  hydroxychloroquine (PLAQUENIL) 200 MG tablet, Take 1 tablet by mouth Daily., Disp: , Rfl:   •  metroNIDAZOLE (Flagyl) 500 MG tablet, Take 1 tablet by mouth 2 (Two) Times a Day., Disp: 14 tablet, Rfl: 0  •  mycophenolate (CELLCEPT) 500 MG tablet, Take 0.5 tablets by mouth Every 12 (Twelve) Hours., Disp: 30 tablet, Rfl: 0  •  NIFEdipine XL (PROCARDIA XL) 90 MG 24 hr tablet, Take 1 tablet by mouth Daily., Disp: 30 tablet, Rfl: 0  •  ondansetron (Zofran) 4 MG tablet, Take 1 tablet by mouth Every 8 (Eight) Hours As Needed for Nausea or Vomiting., Disp: 30 tablet, Rfl: 1  •  polyethylene glycol (MiraLax) 17 GM/SCOOP powder, Take  by mouth., Disp: , Rfl:   •  predniSONE (DELTASONE) 10 MG tablet, Take 1 tablet by mouth Daily., Disp: 30 tablet, Rfl: 0  •  sennosides-docusate (PERICOLACE) 8.6-50 MG per tablet, Take 2 tablets by mouth Daily As Needed for Constipation., Disp: 30 tablet, Rfl: 1  •  simethicone (MYLICON) 80 MG chewable tablet, Chew 1 tablet Every 6 (Six) Hours As Needed., Disp: , Rfl:     Past Medical History:   Diagnosis Date   • Anasarca     PER CT SCAN   • Dry skin    • ESRD (end stage  renal disease) on dialysis     TUES, THURS, [address]   • History of abdominal pain    • History of anemia    • History of transfusion    • Hypertension    • Iron deficiency anemia 09/27/2021   • Lupus (systemic lupus erythematosus) 07/30/2022   • Migraine    • Other specified nutritional anemias    • Pericardial effusion    • Renal insufficiency    • Seizures     STATES LAST WAS 1/2023   • Shortness of breath     OCCASIONAL   • Vitamin D deficiency 09/27/2021       Past Surgical History:   Procedure Laterality Date   • INSERTION HEMODIALYSIS CATHETER N/A 07/26/2022    Procedure: RIGHT TUNNELED DIALYSIS CATHETER PLACEMENT;  Surgeon: Diandra Adhikari MD;  Location: [facility] OR;  Service: Vascular;  Laterality: N/A;   • INSERTION PERITONEAL DIALYSIS CATHETER N/A 4/3/2023    Procedure: INSERTION PERITONEAL DIALYSIS CATHETER LAPAROSCOPIC, omentumpexy;  Surgeon: Jemal Loyola MD;  Location: [facility] MAIN OR;  Service: General;  Laterality: N/A;   • TONSILLECTOMY         Family History   Problem Relation Age of Onset   • Autoimmune disease Mother    • Anemia Mother    • Diabetes Sister    • Anemia Brother    • Diabetes Maternal Grandmother    • Hypertension Maternal Grandmother    • Cancer Maternal Grandmother    • Sickle cell anemia Cousin    • Malig Hyperthermia Neg Hx        Social History     Tobacco Use   • Smoking status: Former     Types: Cigars     Passive exposure: Past   • Smokeless tobacco: Never   • Tobacco comments:     Patient smoked black & mild   Vaping Use   • Vaping Use: Never used   Substance Use Topics   • Alcohol use: Yes     Comment: social   • Drug use: Yes     Types: Marijuana     Comment: OCCASIONAL         ECG 12 Lead    Date/Time: 5/26/2023 1:36 PM  Performed by: Juana Taylor MD  Authorized by: Juana Taylor MD   Comparison: compared with previous ECG   Similar to previous ECG  Rhythm: sinus rhythm  Ectopy: atrial premature contractions  Conduction:  "incomplete right bundle branch block and left posterior fascicular block  Comments: Inferolateral repolarization abnormalities               Objective:     Visit Vitals  /78 (BP Location: Right arm, Patient Position: Sitting, Cuff Size: Adult)   Pulse 70   Ht 165.1 cm (65\")   Wt 57.7 kg (127 lb 3.2 oz)   SpO2 97%   BMI 21.17 kg/m²         Constitutional:       Appearance: Normal appearance. Well-developed.   Eyes:      General: Lids are normal.      Conjunctiva/sclera: Conjunctivae normal.      Pupils: Pupils are equal, round, and reactive to light.   HENT:      Head: Normocephalic and atraumatic.   Neck:      Vascular: No carotid bruit or JVD.      Lymphadenopathy: No cervical adenopathy.   Pulmonary:      Effort: Pulmonary effort is normal.      Breath sounds: Normal breath sounds.   Cardiovascular:      Normal rate. Regular rhythm.      No gallop.   Pulses:     Radial: 2+ bilaterally.  Edema:     Peripheral edema absent.   Abdominal:      Palpations: Abdomen is soft.   Musculoskeletal:      Cervical back: Full passive range of motion without pain, normal range of motion and neck supple. Skin:     General: Skin is warm and dry.   Neurological:      Mental Status: Alert and oriented to person, place, and time.             Assessment:          Diagnosis Plan   1. Abnormal stress test        2. Essential hypertension        3. Pericardial effusion        4. Systemic lupus erythematosus, unspecified SLE type, unspecified organ involvement status        5. ESRD (end stage renal disease)        6. Peritoneal dialysis catheter in place               Plan:       1.  Abnormal stress test.  Due to her exertional symptoms of chest pain and shortness of breath and the need for further evaluation for renal transplant I recommended proceeding with the cardiac catheterization.  I discussed the procedure, risk and benefits at length with the patient and she agrees to proceed.  2.  Pulmonary hypertension.  At least moderate " on last echocardiogram.  We will plan for right heart catheterization as well.  3.  Hypertension.  Well-controlled on current regimen medications.  4.  Severe left ventricular hypertrophy.  Although this is likely due to renal disease will rule out amyloidosis further with a PYP scan.  Another order has been placed.  5.  Pericardial effusion.  Improved on her last echocardiogram in March to a small posterior effusion.  6.  ESRD.  Now on peritoneal dialysis.  7.  Systemic lupus erythematosus.    Further recommendations will be made based on the results of a cardiac catheterization and PYP scan.

## 2023-05-26 NOTE — H&P (VIEW-ONLY)
"    Subjective:     Encounter Date:05/26/2023      Patient ID: Dee Herrera is a 33 y.o. female.    Chief Complaint:  History of Present Illness    This is a 33-year-old with ESRD on dialysis, hypertension, systemic lupus erythematosus, pericardial effusion, left ventricular hypertrophy, cirrhosis, who presents for follow up.      The patient reported that she was diagnosed with end-stage renal disease, systemic lupus, and hypertension all about 6 months ago in 7/2022.  She has been on dialysis since that time through a tunneled dialysis catheter.  She reported that she had been in good health until about a month prior to that admission.  At the time there were plans to proceed with AV fistula placement eventually.  She was also followed by rheumatology for her lupus and was on treatment with CellCept and prednisone.  There is also plans to resume hydroxychloroquine.  It was felt that her lupus was responsible for her renal disease.  She was also planning on undergoing renal transplant evaluation with [facility].       Since starting with dialysis she began to struggle with significant nausea and vomiting.  As part of her work-up she underwent a CT of the abdomen and pelvis in 11/2022 at [facility].  This reported findings concerning for duodenitis and small bowel enteritis, a large pericardial effusion, moderate right and small left pleural effusion, soft tissue anasarca, reactive bilateral inguinal lymphadenopathy, and reportedly findings consistent with \"cardiac cirrhosis\".    Based on these findings Dr. Corey recommended an echocardiogram and referral to our office.    I saw the patient initially in 2/2023.  She underwent an echocardiogram the same day which showed severe left ventricular hypertrophy, with homogeneously speckled and echo bright myocardium concerning for infiltrative cardiomyopathy such as amyloid versus due to longstanding renal disease, normal left ventricular " systolic function with an EF of 64%, longitudinal LV strain of -15%, mildly dilated left ventricle, mildly dilated left atrium, increased left atrial pressure, insufficient TR velocity for RVSP measurement, and a small to moderate pericardial effusion most prominent posteriorly with a small amount anteriorly without any evidence of tamponade.     At that office visit she was still Struggling with nausea and vomiting.  She reported ongoing but stable dyspnea on exertion.  She reported a couple of episodes of chest discomfort a couple months prior but nothing recent.  She denies any family history of heart disease.  At that office visit I recommended proceeding with a PYP scan to rule out amyloidosis as a cause of her left ventricular hypertrophy.  I also sent an SPEP for further evaluation.  UPEP was not ordered because the patient was not producing urine at the time.    Shortly after that office visit she was admitted to the hospital on 2/13/2023 with elevated blood pressures.  During that hospitalization she also reported symptoms of shortness of breath and chest heaviness.  She underwent a repeat echocardiogram on 2/14/2023 which showed normal left ventricular systolic function with an EF of 59%, severe concentric left ventricular hypertrophy, grade 2 diastolic dysfunction, and a moderate to large pericardial effusion that is was large posteriorly but no evidence of tamponade, and elevated left atrial pressure.  Since that the patient appeared to be loculated and there was no clinical evidence of hemodynamic compromise from the effusion no intervention was recommended.  Her blood pressures improved with titration of her medications.    Since that hospitalization the patient was evaluated [facility] renal transplant center at [facility].  They set her up for a stress test and a repeat echocardiogram.  These were performed on 3/13/2023.  Stress test showed evidence of inferolateral infarct with small  amount of sheila-infarct ischemia.  Echocardiogram continue to show normal left ventricular systolic function with an EF of 55 to 60%, severe concentric left ventricular hypertrophy, small posterior pericardial effusion, and moderate pulmonary hypertension with an right ventricular systolic pressure 47 mmHg.  The reports were sent to me and I plan to discuss it further with the patient's follow-up in March but unfortunately she missed that appointment.    It appears that she was admitted to [facility] in April.  Does not appear that she required any cardiac testing during that admission.  Following that admission she was transition to peritoneal dialysis.    The patient indicates that she started peritoneal dialysis her blood pressures have been much better controlled.  She has been having intermittent episodes of palpitations but this is chronic and unchanged.  She continues to have issues with shortness of breath both at rest especially when she is laying flat and with activity.  She also reports episodes of chest heaviness often with activity.  She reports lightheadedness with position changes.  She denies any lower extremity swelling.      Review of Systems   Constitutional: Positive for malaise/fatigue.   HENT: Negative for hearing loss, hoarse voice, nosebleeds and sore throat.    Eyes: Negative for pain.   Cardiovascular: Positive for chest pain, dyspnea on exertion and palpitations. Negative for claudication, cyanosis, irregular heartbeat, leg swelling, near-syncope, orthopnea, paroxysmal nocturnal dyspnea and syncope.   Respiratory: Negative for shortness of breath and snoring.    Endocrine: Negative for cold intolerance, heat intolerance, polydipsia, polyphagia and polyuria.   Skin: Negative for itching and rash.   Musculoskeletal: Negative for arthritis, falls, joint pain, joint swelling, muscle cramps, muscle weakness and myalgias.   Gastrointestinal: Negative for constipation, diarrhea, dysphagia,  heartburn, hematemesis, hematochezia, melena, nausea and vomiting.   Genitourinary: Negative for frequency, hematuria and hesitancy.   Neurological: Positive for light-headedness. Negative for excessive daytime sleepiness, dizziness, headaches, numbness and weakness.   Psychiatric/Behavioral: Negative for depression. The patient is not nervous/anxious.          Current Outpatient Medications:   •  acetaminophen (TYLENOL) 160 MG/5ML suspension, Take 20.3 mL by mouth., Disp: , Rfl:   •  carvedilol (COREG) 25 MG tablet, Take 1 tablet by mouth Every 12 (Twelve) Hours., Disp: 60 tablet, Rfl: 0  •  famotidine (PEPCID) 20 MG tablet, Take 1 tablet by mouth Daily., Disp: 30 tablet, Rfl: 0  •  HYDROcodone-acetaminophen (NORCO) 5-325 MG per tablet, Take 1 tablet by mouth Every 6 (Six) Hours As Needed (Pain)., Disp: 20 tablet, Rfl: 0  •  hydroxychloroquine (PLAQUENIL) 200 MG tablet, Take 1 tablet by mouth Daily., Disp: , Rfl:   •  metroNIDAZOLE (Flagyl) 500 MG tablet, Take 1 tablet by mouth 2 (Two) Times a Day., Disp: 14 tablet, Rfl: 0  •  mycophenolate (CELLCEPT) 500 MG tablet, Take 0.5 tablets by mouth Every 12 (Twelve) Hours., Disp: 30 tablet, Rfl: 0  •  NIFEdipine XL (PROCARDIA XL) 90 MG 24 hr tablet, Take 1 tablet by mouth Daily., Disp: 30 tablet, Rfl: 0  •  ondansetron (Zofran) 4 MG tablet, Take 1 tablet by mouth Every 8 (Eight) Hours As Needed for Nausea or Vomiting., Disp: 30 tablet, Rfl: 1  •  polyethylene glycol (MiraLax) 17 GM/SCOOP powder, Take  by mouth., Disp: , Rfl:   •  predniSONE (DELTASONE) 10 MG tablet, Take 1 tablet by mouth Daily., Disp: 30 tablet, Rfl: 0  •  sennosides-docusate (PERICOLACE) 8.6-50 MG per tablet, Take 2 tablets by mouth Daily As Needed for Constipation., Disp: 30 tablet, Rfl: 1  •  simethicone (MYLICON) 80 MG chewable tablet, Chew 1 tablet Every 6 (Six) Hours As Needed., Disp: , Rfl:     Past Medical History:   Diagnosis Date   • Anasarca     PER CT SCAN   • Dry skin    • ESRD (end stage  renal disease) on dialysis     TUES, THURS, SAT [address]   • History of abdominal pain    • History of anemia    • History of transfusion    • Hypertension    • Iron deficiency anemia 09/27/2021   • Lupus (systemic lupus erythematosus) 07/30/2022   • Migraine    • Other specified nutritional anemias    • Pericardial effusion    • Renal insufficiency    • Seizures     STATES LAST WAS 1/2023   • Shortness of breath     OCCASIONAL   • Vitamin D deficiency 09/27/2021       Past Surgical History:   Procedure Laterality Date   • INSERTION HEMODIALYSIS CATHETER N/A 07/26/2022    Procedure: RIGHT TUNNELED DIALYSIS CATHETER PLACEMENT;  Surgeon: Diandra Adhikari MD;  Location: [facility] OR;  Service: Vascular;  Laterality: N/A;   • INSERTION PERITONEAL DIALYSIS CATHETER N/A 4/3/2023    Procedure: INSERTION PERITONEAL DIALYSIS CATHETER LAPAROSCOPIC, omentumpexy;  Surgeon: Jemal Loyola MD;  Location: [facility] MAIN OR;  Service: General;  Laterality: N/A;   • TONSILLECTOMY         Family History   Problem Relation Age of Onset   • Autoimmune disease Mother    • Anemia Mother    • Diabetes Sister    • Anemia Brother    • Diabetes Maternal Grandmother    • Hypertension Maternal Grandmother    • Cancer Maternal Grandmother    • Sickle cell anemia Cousin    • Malig Hyperthermia Neg Hx        Social History     Tobacco Use   • Smoking status: Former     Types: Cigars     Passive exposure: Past   • Smokeless tobacco: Never   • Tobacco comments:     Patient smoked black & mild   Vaping Use   • Vaping Use: Never used   Substance Use Topics   • Alcohol use: Yes     Comment: social   • Drug use: Yes     Types: Marijuana     Comment: OCCASIONAL         ECG 12 Lead    Date/Time: 5/26/2023 1:36 PM  Performed by: Juana Taylor MD  Authorized by: Juana Taylor MD   Comparison: compared with previous ECG   Similar to previous ECG  Rhythm: sinus rhythm  Ectopy: atrial premature contractions  Conduction:  "incomplete right bundle branch block and left posterior fascicular block  Comments: Inferolateral repolarization abnormalities               Objective:     Visit Vitals  /78 (BP Location: Right arm, Patient Position: Sitting, Cuff Size: Adult)   Pulse 70   Ht 165.1 cm (65\")   Wt 57.7 kg (127 lb 3.2 oz)   SpO2 97%   BMI 21.17 kg/m²         Constitutional:       Appearance: Normal appearance. Well-developed.   Eyes:      General: Lids are normal.      Conjunctiva/sclera: Conjunctivae normal.      Pupils: Pupils are equal, round, and reactive to light.   HENT:      Head: Normocephalic and atraumatic.   Neck:      Vascular: No carotid bruit or JVD.      Lymphadenopathy: No cervical adenopathy.   Pulmonary:      Effort: Pulmonary effort is normal.      Breath sounds: Normal breath sounds.   Cardiovascular:      Normal rate. Regular rhythm.      No gallop.   Pulses:     Radial: 2+ bilaterally.  Edema:     Peripheral edema absent.   Abdominal:      Palpations: Abdomen is soft.   Musculoskeletal:      Cervical back: Full passive range of motion without pain, normal range of motion and neck supple. Skin:     General: Skin is warm and dry.   Neurological:      Mental Status: Alert and oriented to person, place, and time.             Assessment:          Diagnosis Plan   1. Abnormal stress test        2. Essential hypertension        3. Pericardial effusion        4. Systemic lupus erythematosus, unspecified SLE type, unspecified organ involvement status        5. ESRD (end stage renal disease)        6. Peritoneal dialysis catheter in place               Plan:       1.  Abnormal stress test.  Due to her exertional symptoms of chest pain and shortness of breath and the need for further evaluation for renal transplant I recommended proceeding with the cardiac catheterization.  I discussed the procedure, risk and benefits at length with the patient and she agrees to proceed.  2.  Pulmonary hypertension.  At least moderate " on last echocardiogram.  We will plan for right heart catheterization as well.  3.  Hypertension.  Well-controlled on current regimen medications.  4.  Severe left ventricular hypertrophy.  Although this is likely due to renal disease will rule out amyloidosis further with a PYP scan.  Another order has been placed.  5.  Pericardial effusion.  Improved on her last echocardiogram in March to a small posterior effusion.  6.  ESRD.  Now on peritoneal dialysis.  7.  Systemic lupus erythematosus.    Further recommendations will be made based on the results of a cardiac catheterization and PYP scan.

## 2023-06-05 ENCOUNTER — TRANSCRIBE ORDERS (OUTPATIENT)
Dept: CARDIOLOGY | Facility: CLINIC | Age: 33
End: 2023-06-05
Payer: MEDICAID

## 2023-06-05 ENCOUNTER — TELEPHONE (OUTPATIENT)
Dept: GASTROENTEROLOGY | Facility: CLINIC | Age: 33
End: 2023-06-05
Payer: MEDICAID

## 2023-06-05 DIAGNOSIS — Z13.6 SCREENING FOR ISCHEMIC HEART DISEASE: ICD-10-CM

## 2023-06-05 DIAGNOSIS — Z01.810 PRE-OPERATIVE CARDIOVASCULAR EXAMINATION: Primary | ICD-10-CM

## 2023-06-05 NOTE — TELEPHONE ENCOUNTER
WOODROW patient via telephone for. Scheduled COLONOSCOPY/EGD with arrival time of 3PM. Prep paperwork mailed to verified address on file. Patient advised arrival time may change based on [facility] guidelines. WOODROW OCHOA

## 2023-06-14 ENCOUNTER — HOSPITAL ENCOUNTER (OUTPATIENT)
Facility: HOSPITAL | Age: 33
Setting detail: HOSPITAL OUTPATIENT SURGERY
Discharge: HOME | End: 2023-06-14
Attending: INTERNAL MEDICINE | Admitting: INTERNAL MEDICINE
Payer: MEDICAID

## 2023-06-14 VITALS
SYSTOLIC BLOOD PRESSURE: 125 MMHG | HEART RATE: 77 BPM | BODY MASS INDEX: 18.48 KG/M2 | HEIGHT: 66 IN | DIASTOLIC BLOOD PRESSURE: 65 MMHG | TEMPERATURE: 98.6 F | OXYGEN SATURATION: 100 % | RESPIRATION RATE: 18 BRPM | WEIGHT: 115 LBS

## 2023-06-14 DIAGNOSIS — R94.39 ABNORMAL STRESS TEST: ICD-10-CM

## 2023-06-14 DIAGNOSIS — N18.6 ESRD (END STAGE RENAL DISEASE): ICD-10-CM

## 2023-06-14 DIAGNOSIS — I10 ESSENTIAL HYPERTENSION: ICD-10-CM

## 2023-06-14 LAB
ANION GAP SERPL CALCULATED.3IONS-SCNC: 12 MMOL/L (ref 5–15)
BASOPHILS # BLD AUTO: 0.01 10*3/MM3 (ref 0–0.2)
BASOPHILS NFR BLD AUTO: 0.3 % (ref 0–1.5)
BUN SERPL-MCNC: 38 MG/DL (ref 6–20)
BUN/CREAT SERPL: 3.2 (ref 7–25)
CALCIUM SPEC-SCNC: 8.8 MG/DL (ref 8.6–10.5)
CHLORIDE SERPL-SCNC: 95 MMOL/L (ref 98–107)
CO2 SERPL-SCNC: 30 MMOL/L (ref 22–29)
CREAT SERPL-MCNC: 11.7 MG/DL (ref 0.57–1)
DEPRECATED RDW RBC AUTO: 42.4 FL (ref 37–54)
EGFRCR SERPLBLD CKD-EPI 2021: 4 ML/MIN/1.73
EOSINOPHIL # BLD AUTO: 0.05 10*3/MM3 (ref 0–0.4)
EOSINOPHIL NFR BLD AUTO: 1.3 % (ref 0.3–6.2)
ERYTHROCYTE [DISTWIDTH] IN BLOOD BY AUTOMATED COUNT: 15.1 % (ref 12.3–15.4)
GLUCOSE SERPL-MCNC: 84 MG/DL (ref 65–99)
HCT VFR BLD AUTO: 32.6 % (ref 34–46.6)
HGB BLD-MCNC: 10.1 G/DL (ref 12–15.9)
LYMPHOCYTES # BLD AUTO: 0.86 10*3/MM3 (ref 0.7–3.1)
LYMPHOCYTES NFR BLD AUTO: 22.6 % (ref 19.6–45.3)
MCH RBC QN AUTO: 24.3 PG (ref 26.6–33)
MCHC RBC AUTO-ENTMCNC: 31 G/DL (ref 31.5–35.7)
MCV RBC AUTO: 78.4 FL (ref 79–97)
MONOCYTES # BLD AUTO: 0.49 10*3/MM3 (ref 0.1–0.9)
MONOCYTES NFR BLD AUTO: 12.9 % (ref 5–12)
NEUTROPHILS NFR BLD AUTO: 2.37 10*3/MM3 (ref 1.7–7)
NEUTROPHILS NFR BLD AUTO: 62.4 % (ref 42.7–76)
PLAT MORPH BLD: NORMAL
PLATELET # BLD AUTO: 100 10*3/MM3 (ref 140–450)
POTASSIUM SERPL-SCNC: 3.7 MMOL/L (ref 3.5–5.2)
RBC # BLD AUTO: 4.16 10*6/MM3 (ref 3.77–5.28)
RBC MORPH BLD: NORMAL
SODIUM SERPL-SCNC: 137 MMOL/L (ref 136–145)
WBC MORPH BLD: NORMAL
WBC NRBC COR # BLD: 3.8 10*3/MM3 (ref 3.4–10.8)

## 2023-06-14 PROCEDURE — 93460 R&L HRT ART/VENTRICLE ANGIO: CPT | Performed by: INTERNAL MEDICINE

## 2023-06-14 PROCEDURE — 25510000001 IOPAMIDOL PER 1 ML: Performed by: INTERNAL MEDICINE

## 2023-06-14 PROCEDURE — 80048 BASIC METABOLIC PNL TOTAL CA: CPT | Performed by: INTERNAL MEDICINE

## 2023-06-14 PROCEDURE — 85025 COMPLETE CBC W/AUTO DIFF WBC: CPT | Performed by: INTERNAL MEDICINE

## 2023-06-14 PROCEDURE — 85018 HEMOGLOBIN: CPT

## 2023-06-14 PROCEDURE — C1769 GUIDE WIRE: HCPCS | Performed by: INTERNAL MEDICINE

## 2023-06-14 PROCEDURE — 99152 MOD SED SAME PHYS/QHP 5/>YRS: CPT | Performed by: INTERNAL MEDICINE

## 2023-06-14 PROCEDURE — C1894 INTRO/SHEATH, NON-LASER: HCPCS | Performed by: INTERNAL MEDICINE

## 2023-06-14 PROCEDURE — 82810 BLOOD GASES O2 SAT ONLY: CPT

## 2023-06-14 PROCEDURE — 25010000002 HEPARIN (PORCINE) PER 1000 UNITS: Performed by: INTERNAL MEDICINE

## 2023-06-14 PROCEDURE — 85014 HEMATOCRIT: CPT

## 2023-06-14 PROCEDURE — 25010000002 FENTANYL CITRATE (PF) 50 MCG/ML SOLUTION: Performed by: INTERNAL MEDICINE

## 2023-06-14 PROCEDURE — 85007 BL SMEAR W/DIFF WBC COUNT: CPT | Performed by: INTERNAL MEDICINE

## 2023-06-14 PROCEDURE — 25010000002 MIDAZOLAM PER 1 MG: Performed by: INTERNAL MEDICINE

## 2023-06-14 PROCEDURE — 99153 MOD SED SAME PHYS/QHP EA: CPT | Performed by: INTERNAL MEDICINE

## 2023-06-14 RX ORDER — SODIUM CHLORIDE 0.9 % (FLUSH) 0.9 %
10 SYRINGE (ML) INJECTION EVERY 12 HOURS SCHEDULED
Status: DISCONTINUED | OUTPATIENT
Start: 2023-06-14 | End: 2023-06-14 | Stop reason: HOSPADM

## 2023-06-14 RX ORDER — SODIUM CHLORIDE 9 MG/ML
40 INJECTION, SOLUTION INTRAVENOUS AS NEEDED
Status: DISCONTINUED | OUTPATIENT
Start: 2023-06-14 | End: 2023-06-14 | Stop reason: HOSPADM

## 2023-06-14 RX ORDER — SODIUM CHLORIDE 0.9 % (FLUSH) 0.9 %
10 SYRINGE (ML) INJECTION AS NEEDED
Status: DISCONTINUED | OUTPATIENT
Start: 2023-06-14 | End: 2023-06-14 | Stop reason: HOSPADM

## 2023-06-14 RX ORDER — ACETAMINOPHEN 325 MG/1
650 TABLET ORAL EVERY 4 HOURS PRN
Status: DISCONTINUED | OUTPATIENT
Start: 2023-06-14 | End: 2023-06-14 | Stop reason: HOSPADM

## 2023-06-14 RX ORDER — MIDAZOLAM HYDROCHLORIDE 1 MG/ML
INJECTION INTRAMUSCULAR; INTRAVENOUS
Status: DISCONTINUED | OUTPATIENT
Start: 2023-06-14 | End: 2023-06-14 | Stop reason: HOSPADM

## 2023-06-14 RX ORDER — HEPARIN SODIUM 1000 [USP'U]/ML
INJECTION, SOLUTION INTRAVENOUS; SUBCUTANEOUS
Status: DISCONTINUED | OUTPATIENT
Start: 2023-06-14 | End: 2023-06-14 | Stop reason: HOSPADM

## 2023-06-14 RX ORDER — FENTANYL CITRATE 50 UG/ML
INJECTION, SOLUTION INTRAMUSCULAR; INTRAVENOUS
Status: DISCONTINUED | OUTPATIENT
Start: 2023-06-14 | End: 2023-06-14 | Stop reason: HOSPADM

## 2023-06-14 RX ORDER — SODIUM CHLORIDE 9 MG/ML
75 INJECTION, SOLUTION INTRAVENOUS CONTINUOUS
Status: DISCONTINUED | OUTPATIENT
Start: 2023-06-14 | End: 2023-06-14 | Stop reason: HOSPADM

## 2023-06-14 RX ORDER — LIDOCAINE HYDROCHLORIDE 20 MG/ML
INJECTION, SOLUTION INFILTRATION; PERINEURAL
Status: DISCONTINUED | OUTPATIENT
Start: 2023-06-14 | End: 2023-06-14 | Stop reason: HOSPADM

## 2023-06-14 RX ORDER — VERAPAMIL HYDROCHLORIDE 2.5 MG/ML
INJECTION, SOLUTION INTRAVENOUS
Status: DISCONTINUED | OUTPATIENT
Start: 2023-06-14 | End: 2023-06-14 | Stop reason: HOSPADM

## 2023-06-14 RX ADMIN — SODIUM CHLORIDE 75 ML/HR: 9 INJECTION, SOLUTION INTRAVENOUS at 08:02

## 2023-06-14 NOTE — DISCHARGE INSTRUCTIONS
[facility]  [address] [place] [address]    Coronary Angiogram (Radial/Ulnar Approach) After Care    Refer to this sheet in the next few weeks. These instructions provide you with information on caring for yourself after your procedure. Your caregiver may also give you more specific instructions. Your treatment has been planned according to current medical practices, but problems sometimes occur. Call your caregiver if you have any problems or questions after your procedure.    Home Care Instructions:  You may shower the day after the procedure. Remove the bandage (dressing) and gently wash the site with plain soap and water. Gently pat the site dry. You may apply a band aid daily for 2 days if desired.    Do not apply powder or lotion to the site.  Do not submerge the affected site in water for 3 to 5 days or until the site is completely healed.   Do not lift, push or pull anything over 5 pounds for 5 days after your procedure or as directed by your physician.  As a reference, a gallon of milk weighs 8 pounds.   Inspect the site at least twice daily. You may notice some bruising at the site and it may be tender for 1 to 2 weeks.     Increase your fluid intake for the next 2 days.    Keep arm elevated for 24 hours. For the remainder of the day, keep your arm in “Pledge of Allegiance” position when up and about.     You may drive 24 hours after the procedure unless otherwise instructed by your caregiver.  Do not operate machinery or power tools for 24 hours.  A responsible adult should be with you for the first 24 hours after you arrive home. Do not make any important legal decisions or sign legal papers for 24 hours.  Do not drink alcohol for 24 hours.    Metformin or any medications containing Metformin should not be taken for 48 hours after your procedure.      Call Your Doctor if:   You have unusual pain at the radial/ulnar (wrist) site.  You have redness, warmth, swelling, or pain at the  radial/ulnar (wrist) site.  You have drainage (other than a small amount of blood on the dressing).  `You have chills or a fever > 101.  Your arm becomes pale or dark, cool, tingly, or numb.  You develop chest pain, shortness of breath, feel faint or pass out.    You have heavy bleeding from the site, hold pressure on the site for 20 minutes.  If the bleeding stops, apply a fresh bandage and call your cardiologist.  However, if you        continue to have bleeding, call 911 and continue to apply pressure to the site.   You have any symptoms of a stroke.  Remember BE FAST  B-balance. Sudden trouble walking or loss of balance.  E-eyes.  Sudden changes in how you see or a sudden onset of a very bad headache.   F-face. Sudden weakness or loss of feeling of the face or facial droop on one side.   A-arms Sudden weakness or numbness in one arm.  One arm drifts down if they are both held out in front of you. This happens suddenly and usually on one side of the body.   S-speech.  Sudden trouble speaking, slurred speech or trouble understanding what are saying.   T-time  Time to call emergency services.  Write down the symptoms and the time they started.

## 2023-06-14 NOTE — Clinical Note
Unable to obtain R hand post saturation. Attempted with sensus pulse ox and portable pulse ox. Cap refill assessed and less than 3 seconds.

## 2023-06-14 NOTE — Clinical Note
Allergies reviewed.  Procedural consent has been signed.  Labs are pending for this patient. Physician is aware of labs.

## 2023-06-14 NOTE — Clinical Note
Hemostasis started on the right brachial vein. Manual pressure applied to vessel. Manual pressure was held by CC RTR. Manual pressure was held for 5 min. Hemostasis achieved successfully. Closure device additional comment: Tegaderm and 4x4

## 2023-06-14 NOTE — Clinical Note
A 5 fr sheath was  inserted with ultrasound guidance into the right brachial vein. Sheath insertion not delayed.

## 2023-06-14 NOTE — Clinical Note
Hemostasis started on the right radial artery. R-Band was used in achieving hemostasis. Radial compression device applied to vessel. Hemostasis achieved successfully. Closure device additional comment: TR band 12 cc Detail Level: Detailed Quality 110: Preventive Care And Screening: Influenza Immunization: Influenza Immunization Administered during Influenza season

## 2023-06-16 LAB
HCT VFR BLDA CALC: 31 % (ref 38–51)
HCT VFR BLDA CALC: 32 % (ref 38–51)
HGB BLDA-MCNC: 10.5 G/DL (ref 12–17)
HGB BLDA-MCNC: 10.9 G/DL (ref 12–17)
SAO2 % BLDA: 73 % (ref 95–98)
SAO2 % BLDA: 97 % (ref 95–98)

## 2023-07-13 ENCOUNTER — TELEPHONE (OUTPATIENT)
Dept: GASTROENTEROLOGY | Facility: CLINIC | Age: 33
End: 2023-07-13

## 2023-07-13 NOTE — TELEPHONE ENCOUNTER
Hub staff attempted to follow warm transfer process and was unsuccessful     Caller: Dee Herrera    Relationship to patient: Self    Best call back number: [phone]     Patient is needing: PT LAB APPT IS 7/13 11AM AND DOES NOT HAVE A RIDE. PLEASE CALL TO RESCHEDULE.

## 2023-07-19 RX ORDER — POTASSIUM CHLORIDE 750 MG/1
10 TABLET, FILM COATED, EXTENDED RELEASE ORAL DAILY
COMMUNITY

## 2023-07-19 RX ORDER — SEVELAMER CARBONATE 800 MG/1
800 TABLET, FILM COATED ORAL
COMMUNITY

## 2023-07-20 ENCOUNTER — HOSPITAL ENCOUNTER (OUTPATIENT)
Facility: HOSPITAL | Age: 33
Setting detail: HOSPITAL OUTPATIENT SURGERY
Discharge: HOME | End: 2023-07-20
Attending: INTERNAL MEDICINE | Admitting: INTERNAL MEDICINE
Payer: MEDICAID

## 2023-07-20 VITALS
DIASTOLIC BLOOD PRESSURE: 92 MMHG | OXYGEN SATURATION: 98 % | HEART RATE: 91 BPM | HEIGHT: 65 IN | WEIGHT: 116.1 LBS | RESPIRATION RATE: 20 BRPM | BODY MASS INDEX: 19.34 KG/M2 | SYSTOLIC BLOOD PRESSURE: 166 MMHG

## 2023-07-20 DIAGNOSIS — K76.1 CARDIAC CIRRHOSIS: ICD-10-CM

## 2023-07-20 DIAGNOSIS — R19.8 ALTERNATING CONSTIPATION AND DIARRHEA: ICD-10-CM

## 2023-07-20 DIAGNOSIS — R10.9 ABDOMINAL PAIN, UNSPECIFIED ABDOMINAL LOCATION: ICD-10-CM

## 2023-07-20 PROCEDURE — 43239 EGD BIOPSY SINGLE/MULTIPLE: CPT | Performed by: INTERNAL MEDICINE

## 2023-07-20 PROCEDURE — 45380 COLONOSCOPY AND BIOPSY: CPT | Performed by: INTERNAL MEDICINE

## 2023-07-20 PROCEDURE — 88305 TISSUE EXAM BY PATHOLOGIST: CPT | Performed by: INTERNAL MEDICINE

## 2023-07-20 PROCEDURE — 88342 IMHCHEM/IMCYTCHM 1ST ANTB: CPT | Performed by: INTERNAL MEDICINE

## 2023-07-20 RX ORDER — SODIUM CHLORIDE 9 MG/ML
1000 INJECTION, SOLUTION INTRAVENOUS CONTINUOUS
Status: DISCONTINUED | OUTPATIENT
Start: 2023-07-20 | End: 2023-07-20 | Stop reason: HOSPADM

## 2023-07-20 RX ORDER — SODIUM CHLORIDE 0.9 % (FLUSH) 0.9 %
10 SYRINGE (ML) INJECTION AS NEEDED
Status: DISCONTINUED | OUTPATIENT
Start: 2023-07-20 | End: 2023-07-20 | Stop reason: HOSPADM

## 2023-07-20 RX ADMIN — SODIUM CHLORIDE 1000 ML: 9 INJECTION, SOLUTION INTRAVENOUS at 16:15

## 2023-07-20 NOTE — DISCHARGE INSTRUCTIONS
For the next 24 hours patient needs to be with a responsible adult.    For 24 hours DO NOT drive, operate machinery, appliances, drink alcohol, make important decisions or sign legal documents.    Start with a light or bland diet if you are feeling sick to your stomach otherwise advance to regular diet as tolerated.    Follow recommendations on procedure report if provided by your doctor.    Call Dr Kaminski for problems [phone]    Problems may include but not limited to: large amounts of bleeding, trouble breathing, repeated vomiting, severe unrelieved pain, fever or chills.

## 2023-07-20 NOTE — NURSING NOTE
Dr Everett at bedside, aware of BP and Lupus flare causing the hand discomfort.  500cc tap water enema given per MD order, with only the tap water coming back out.  AA and Dr Kaminski aware.

## 2023-07-20 NOTE — H&P
"Chief Complaint   Patient presents with    Abdominal Pain         History of Present Illness:   32 y.o. female        Dr. Orion Corey sent me a note in 2 of 2023 that said:     \"Drew, I got a patient that has renal failure and is on dialysis.  She was having severe abdominal pain and a CT of the abdomen pelvis was obtained.  She has marked thickening of the duodenal loop with 8 mm concern for duodenitis and small bowel enteritis.  She also has possible acute pancreatitis and or pancreatic phlegmon.  She also has a diagnosis of lupus and I have ordered duplex mesenteric artery scan to look for atherosclerotic or vasculitis.  I asked that she be referred to you, could you possibly take a look at her.  Her name is Dee Herrera MR# [id number] .  She is only 33 years old.\"       I saw the patient in the office in 2 of 2023.  My assessment and plan was as follows:  Assessment:  1. Pancytopenia  2. 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 HTN  4. H/o seizure disorder (felt to be from poorly controlled HTN?)  5. H/o abdominal pain  6. Abnormal CT abd with evidence of \"cardiac cirrhosis\".  7. Abnormal CT abd with \"Mild acute pancreatitis with peripancreatic phlegmon.  No pseudocyst or calcifications.\" She is not a drinker of much ETOH. She has sludge in her GB on CT abd.  8. Could the \"duodenitis\" be from the pancreatitis with some involvement of the adjacent duodenum?  9, Diarrhea  10.  Weight loss.  11. Poorly controlled BP. She has been out of meds x 1 day and says she will  her HTN meds today. 180/120 rechecked with P82.  12.      Recommendations:  1. EGD.  I will evaluate the \"duodenitis\" and to look for varices given her \"cirrhosis\" seen on CT abd/pelvis.  We will hold off on the EGD till her BP is better controlled.   2. US of the gallbladder and liver, to see if gallbladder disease could be the source of her previous pancreatitis? And to see if there could be any liver masses given her \"cirrhosis\"?  3. " Should she be seen by Hematology to evaluate her pancytopenia?  I will defer this question to Dr. Orion Corey?  4. Labs: Hep profile, ceruloplasmin, AMA, GIANFRANCO (looking for other causes of cirrhosis?), AFP, etc.  5. Stool studies.  6. Get records from her last hospital admission (@ )  7. I think she should go to the ER to get her BP down today.   8. I will be curious what her repeat CT abd shows (to be done at [facility] this week).  9. F/u 3 weeks.        Dr. JUAN Cotton saw her 2/14/23 (the day after I saw her) and he said:  Impression:  HTN  ESRD on HD  SLE  Chronic Diffuse Abdominal Pain  Diarrhea  Cirrhotic appearing liver on Imaging  Abnormal Imaging of Pancreas in November suggestive of pancreatitis  Ansasarca  Thrombocytopenia     Plan:  - CT abd/pelvis at  in November that noted marked wall thickening of duodenal and jejunal loop of small bowel, marked cardiac cirrhosis with sludge in GB, no ben dil, mild acute pancreatitis with peripancreatic phlegmon, small left effusion and marked pericardial effusion, anasarca, reactve inguinal LAD likely post infectious or inflammatory  - Repeat CT abd/pelvis w contrast   - Infectious stool studies with c diff and GI PCR  - Check some of the labs as outlined in Dr. Kaminski note for AM: GIANFRANCO, AMA. ASMA, Ig profile, TTG IgA, ceruloplasmin, acute hep panel, A1AT       She was last seen when she was an inpatient in 2 of 2023 and the nurse practitioner that saw her put in her note:  Assessment:  Diffuse chronic abdominal pain  ESRD on HD, follows with  for possible transplant  SLE  Hypertension  Diarrhea-resolved.  C. difficile toxin PCR positive, antigen negative suggestive of colonization  Abnormal imaging of the pancreas suggestive of pancreatitis but clinical symptoms do not match.  Serum lipase level normal at 22 on 2/15/2023.  CT findings of possible gastric wall thickening/gastritis  Splenomegaly  Mildly elevated AST at 42  Ceruloplasmin at 17     Plan:  Continue oral  vancomycin for C. difficile per primary team.  Ceruloplasmin low at 17, we will order a serum copper level.  As patient produces very little urine, there would not be benefit from 24-hour urine copper collection.  AMA, SMA negative  GIANFRANCO positive-given patient's history of SLE  Alpha 1 antitrypsin level still pending  Patient reports occasional epigastric discomfort, but nothing significant.  She does not have a history of GERD per her report.  Discussion was held with patient regarding findings on imaging of possible gastric wall thickening/possible gastritis.  Patient amenable to outpatient follow-up with GI for EGD.  Likely sign off tomorrow as long as serum copper level is normal and would recommend outpatient follow-up with GI with possible EGD.        It sounds like her abdominal pain was from clostridium dificile colitis.       She has some abd pain since the PD catheter was placed 2 days ago. Rare nausea. No diarrhea, she gets constipated sometimes. NO rectal bleeding or melena.     Medical History        Past Medical History:   Diagnosis Date    Anasarca       PER CT SCAN    Dry skin      ESRD (end stage renal disease) on dialysis       TUES, THURS, SAT [address]    History of abdominal pain      History of anemia      History of transfusion      Hypertension      Iron deficiency anemia 09/27/2021    Lupus (systemic lupus erythematosus) 07/30/2022    Migraine      Other specified nutritional anemias      Pericardial effusion      Seizures       STATES LAST WAS 1/2023    Shortness of breath       OCCASIONAL    Vitamin D deficiency 09/27/2021            Surgical History         Past Surgical History:   Procedure Laterality Date    INSERTION HEMODIALYSIS CATHETER N/A 07/26/2022     Procedure: RIGHT TUNNELED DIALYSIS CATHETER PLACEMENT;  Surgeon: Diandra Adhikari MD;  Location: [facility];  Service: Vascular;  Laterality: N/A;    INSERTION PERITONEAL DIALYSIS CATHETER N/A 4/3/2023      Procedure: INSERTION PERITONEAL DIALYSIS CATHETER LAPAROSCOPIC, omentumpexy;  Surgeon: Jemal Loyola MD;  Location[facility] MAIN OR;  Service: General;  Laterality: N/A;    TONSILLECTOMY                   Current Outpatient Medications:     acetaminophen (TYLENOL) 160 MG/5ML suspension, Take 20.3 mL by mouth., Disp: , Rfl:     carvedilol (COREG) 25 MG tablet, Take 1 tablet by mouth Every 12 (Twelve) Hours., Disp: 60 tablet, Rfl: 0    famotidine (PEPCID) 20 MG tablet, Take 1 tablet by mouth Daily., Disp: 30 tablet, Rfl: 0    hydrALAZINE (APRESOLINE) 25 MG tablet, Take 1 tablet by mouth 3 (Three) Times a Day., Disp: , Rfl:     HYDROcodone-acetaminophen (NORCO) 5-325 MG per tablet, Take 1 tablet by mouth Every 6 (Six) Hours As Needed (Pain)., Disp: 20 tablet, Rfl: 0    hydroxychloroquine (PLAQUENIL) 200 MG tablet, Take 1 tablet by mouth Daily., Disp: , Rfl:     lacosamide (VIMPAT) 50 MG tablet tablet, Take 1 tablet by mouth Every 12 (Twelve) Hours., Disp: 60 tablet, Rfl: 0    mycophenolate (CELLCEPT) 500 MG tablet, Take 0.5 tablets by mouth Every 12 (Twelve) Hours., Disp: 30 tablet, Rfl: 0    NIFEdipine XL (PROCARDIA XL) 90 MG 24 hr tablet, Take 1 tablet by mouth Daily., Disp: 30 tablet, Rfl: 0    ondansetron (Zofran) 4 MG tablet, Take 1 tablet by mouth Every 8 (Eight) Hours As Needed for Nausea or Vomiting., Disp: 30 tablet, Rfl: 1    predniSONE (DELTASONE) 10 MG tablet, Take 1 tablet by mouth Daily., Disp: 30 tablet, Rfl: 0    sennosides-docusate (PERICOLACE) 8.6-50 MG per tablet, Take 2 tablets by mouth Daily As Needed for Constipation., Disp: 30 tablet, Rfl: 1    simethicone (MYLICON) 80 MG chewable tablet, Chew 1 tablet Every 6 (Six) Hours As Needed., Disp: , Rfl:            Allergies   Allergen Reactions    Minoxidil Other (See Comments)       Pericardial effusion .               Family History   Problem Relation Age of Onset    Autoimmune disease Mother      Anemia Mother      Diabetes Sister       Anemia Brother      Diabetes Maternal Grandmother      Hypertension Maternal Grandmother      Cancer Maternal Grandmother      Sickle cell anemia Cousin      [place] Hyperthermia Neg Hx           Social History   Social History            Socioeconomic History    Marital status: Single   Tobacco Use    Smoking status: Former       Types: Cigars    Smokeless tobacco: Never    Tobacco comments:       Patient smoked black & mild   Vaping Use    Vaping Use: Never used   Substance and Sexual Activity    Alcohol use: Yes       Comment: social    Drug use: Yes       Types: Marijuana       Comment: OCCASIONAL    Sexual activity: Not Currently       Partners: Male       Birth control/protection: None            Review of Systems  Gastrointestinal: Negative for abdominal pain.   All other systems reviewed and are negative.     Pertinent positives and negatives documented in the HPI and all other systems reviewed and were found to be negative.      Vitals:     04/05/23 1533   BP: 147/90   Pulse: 87   Temp: 97.8 °F (36.6 °C)   SpO2: 98%         Physical Exam  Vitals reviewed.   Constitutional:       General: She is not in acute distress.     Appearance: Normal appearance. She is well-developed. She is not diaphoretic.   HENT:      Head: Normocephalic and atraumatic. Hair is normal.      Right Ear: Hearing, tympanic membrane, ear canal and external ear normal. No decreased hearing noted. No drainage.      Left Ear: Hearing, tympanic membrane, ear canal and external ear normal. No decreased hearing noted.      Nose: Nose normal. No nasal deformity.      Mouth/Throat:      Mouth: Mucous membranes are moist.   Eyes:      General: Lids are normal.         Right eye: No discharge.         Left eye: No discharge.      Extraocular Movements: Extraocular movements intact.      Conjunctiva/sclera: Conjunctivae normal.      Pupils: Pupils are equal, round, and reactive to light.   Neck:      Thyroid: No thyromegaly.      Vascular: No JVD.       Trachea: No tracheal deviation.   Cardiovascular:      Rate and Rhythm: Normal rate and regular rhythm.      Pulses: Normal pulses.      Heart sounds: Normal heart sounds. No murmur heard.    No friction rub. No gallop.   Pulmonary:      Effort: Pulmonary effort is normal. No respiratory distress.      Breath sounds: Normal breath sounds. No wheezing or rales.   Chest:      Chest wall: No tenderness.   Abdominal:      General: Bowel sounds are normal. There is no distension.      Palpations: Abdomen is soft. There is no mass.      Tenderness: There is no abdominal tenderness. There is no guarding or rebound.      Hernia: No hernia is present.   Musculoskeletal:         General: No tenderness or deformity. Normal range of motion.      Cervical back: Normal range of motion and neck supple.   Lymphadenopathy:      Cervical: No cervical adenopathy.   Skin:     General: Skin is warm and dry.      Findings: No erythema or rash.   Neurological:      Mental Status: She is alert and oriented to person, place, and time.      Cranial Nerves: No cranial nerve deficit.      Motor: No abnormal muscle tone.      Coordination: Coordination normal.      Deep Tendon Reflexes: Reflexes are normal and symmetric. Reflexes normal.   Psychiatric:         Mood and Affect: Mood normal.         Behavior: Behavior normal.         Thought Content: Thought content normal.         Judgment: Judgment normal.            Diagnoses and all orders for this visit:     1. Cardiac cirrhosis (Primary)  -     Case Request; Standing  -     Case Request     2. Abdominal pain, unspecified abdominal location  -     US Gallbladder; Future  -     Case Request; Standing  -     Case Request     3. Alternating constipation and diarrhea  -     Case Request; Standing  -     Case Request     Other orders  -     Follow Anesthesia Guidelines / Protocol; Future  -     Obtain Informed Consent; Future  -     Implement Anesthesia orders day of procedure.; Standing  -   "   Obtain informed consent; Standing  -     Verify bowel prep was successful; Standing  -     Give tap water enema if bowel prep was insufficient; Standing        Assessment:  1. H/o \"cardiac cirrhosis\" seen on CT abd, though it was not seen on the last CT abd done last month at [facility].   2. Alternating diarrhea and constiaption  3. H//o abdominal pain in the past with one CT abd showing possible pancreatitis.   4. She is being evaluated for a possible kidney transplant.  5. Hemodialysis patient.   6.      Recommendations:  1. EGD + colonoscopy   2. US of the gallbladder  3. Could we get a CBC one week before the EGD and colonoscopy to make sure that her platelets are high enough to be able to tolerate an EGD and colonoscopy?     No follow-ups on file.     7/20/23 - No change from the above H and P.   Drew Kaminski MD      "

## 2023-07-25 LAB
LAB AP CASE REPORT: NORMAL
LAB AP DIAGNOSIS COMMENT: NORMAL
PATH REPORT.FINAL DX SPEC: NORMAL
PATH REPORT.GROSS SPEC: NORMAL

## 2023-07-26 NOTE — PROGRESS NOTES
07/26/23       Tell her that pathology from her recent EGD showed inflammation in the stomach but no evidence of Helicobacter pylori.  The colon biopsies were normal.       Please send a copy of this report to her PCP.  Also asked the patient who we need to send copies of the EGD and colonoscopy reports to I am thinking of people who are working her up for a possible kidney transplant.  They need to have copies of these reports.  Thx. kjh

## 2023-07-28 ENCOUNTER — TELEPHONE (OUTPATIENT)
Dept: GASTROENTEROLOGY | Facility: CLINIC | Age: 33
End: 2023-07-28
Payer: MEDICAID

## 2023-07-28 NOTE — TELEPHONE ENCOUNTER
----- Message from Drew Kaminski MD sent at 7/26/2023  8:13 AM EDT -----  07/26/23       Tell her that pathology from her recent EGD showed inflammation in the stomach but no evidence of Helicobacter pylori.  The colon biopsies were normal.       Please send a copy of this report to her PCP.  Also asked the patient who we need to send copies of the EGD and colonoscopy reports to I am thinking of people who are working her up for a possible kidney transplant.  They need to have copies of these reports.  Thx. kjh

## 2023-07-28 NOTE — TELEPHONE ENCOUNTER
Called pt and advised of Dr Kaminski's note. Verb understanding.     Pt states at this time to just send results to her pcp.      Results sent to Margarita MCLAIN thru [facility].

## 2023-09-25 ENCOUNTER — TELEPHONE (OUTPATIENT)
Dept: FAMILY MEDICINE CLINIC | Facility: CLINIC | Age: 33
End: 2023-09-25

## 2023-09-25 NOTE — TELEPHONE ENCOUNTER
Caller: Dee Herrera    Relationship to patient: Self    Best call back number:     Patient is needing: PATIENT IS CALLING REQUESTING AN INCREASE IN HER DOSE OF OXYCODONE.    PLEASE CALL THE PATIENT TO LET HER KNOW IF THAT IS OKAY.      Spoke with patient informed him per  he needs to make a follow up with  and  for pulmonary nodules. Patient verbally voiced understanding and states he already have appointments scheduled. Ct scan results faxed to both doctors per patient request

## 2023-09-26 NOTE — TELEPHONE ENCOUNTER
Wrote patient on mychart explaining the steps she needs to take to get a medication increase or refill

## 2023-10-09 NOTE — PLAN OF CARE
----- Message from Martha Duffy MD sent at 10/9/2023 12:11 PM CDT -----  Negative Cologuard test results   Goal Outcome Evaluation:Hemodialysis done took off 1.5 L of fluid. SBP still high but below 180 mm/hg , one time dose of hydralazine given for BP of 172/120 as prn. Still complaining of abdominal pain medicated. All needs were met , will continue to monitor.

## 2023-10-26 ENCOUNTER — HOSPITAL ENCOUNTER (INPATIENT)
Facility: HOSPITAL | Age: 33
LOS: 44 days | Discharge: NURSING, REHAB OR LONG-TERM CARE | DRG: 219 | End: 2023-12-09
Attending: EMERGENCY MEDICINE | Admitting: INTERNAL MEDICINE
Payer: MEDICARE

## 2023-10-26 ENCOUNTER — APPOINTMENT (OUTPATIENT)
Dept: GENERAL RADIOLOGY | Facility: HOSPITAL | Age: 33
DRG: 219 | End: 2023-10-26
Payer: MEDICARE

## 2023-10-26 DIAGNOSIS — I31.39 PERICARDIAL EFFUSION: ICD-10-CM

## 2023-10-26 DIAGNOSIS — R10.84 GENERALIZED ABDOMINAL PAIN: ICD-10-CM

## 2023-10-26 DIAGNOSIS — Z99.2 ESRD (END STAGE RENAL DISEASE) ON DIALYSIS: ICD-10-CM

## 2023-10-26 DIAGNOSIS — R06.02 SHORTNESS OF BREATH: Primary | ICD-10-CM

## 2023-10-26 DIAGNOSIS — Z86.73 RECENT CEREBROVASCULAR ACCIDENT (CVA): ICD-10-CM

## 2023-10-26 DIAGNOSIS — R79.89 ELEVATED LACTIC ACID LEVEL: ICD-10-CM

## 2023-10-26 DIAGNOSIS — R79.89 ELEVATED TROPONIN: ICD-10-CM

## 2023-10-26 DIAGNOSIS — I35.1 SEVERE AORTIC VALVE REGURGITATION: ICD-10-CM

## 2023-10-26 DIAGNOSIS — N18.6 END STAGE RENAL DISEASE ON DIALYSIS: ICD-10-CM

## 2023-10-26 DIAGNOSIS — Z99.2 PERITONEAL DIALYSIS CATHETER IN PLACE: ICD-10-CM

## 2023-10-26 DIAGNOSIS — Z95.2 S/P AVR: ICD-10-CM

## 2023-10-26 DIAGNOSIS — N18.6 ESRD (END STAGE RENAL DISEASE) ON DIALYSIS: ICD-10-CM

## 2023-10-26 DIAGNOSIS — E87.1 HYPONATREMIA: ICD-10-CM

## 2023-10-26 DIAGNOSIS — Z99.2 END STAGE RENAL DISEASE ON DIALYSIS: ICD-10-CM

## 2023-10-26 PROBLEM — R63.0 POOR APPETITE: Status: ACTIVE | Noted: 2023-10-26

## 2023-10-26 PROBLEM — E44.0 MODERATE MALNUTRITION: Status: ACTIVE | Noted: 2023-10-26

## 2023-10-26 PROBLEM — I27.20 PULMONARY HYPERTENSION: Status: ACTIVE | Noted: 2023-10-26

## 2023-10-26 PROBLEM — D63.1 ANEMIA DUE TO CHRONIC KIDNEY DISEASE, ON CHRONIC DIALYSIS: Status: ACTIVE | Noted: 2023-04-03

## 2023-10-26 PROBLEM — I50.32 CHRONIC DIASTOLIC CHF (CONGESTIVE HEART FAILURE): Status: ACTIVE | Noted: 2023-10-26

## 2023-10-26 LAB
ALBUMIN SERPL-MCNC: 3.9 G/DL (ref 3.5–5.2)
ALBUMIN/GLOB SERPL: 1.5 G/DL
ALP SERPL-CCNC: 116 U/L (ref 39–117)
ALT SERPL W P-5'-P-CCNC: 71 U/L (ref 1–33)
ANION GAP SERPL CALCULATED.3IONS-SCNC: 18 MMOL/L (ref 5–15)
ANION GAP SERPL CALCULATED.3IONS-SCNC: 19 MMOL/L (ref 5–15)
ANION GAP SERPL CALCULATED.3IONS-SCNC: 21 MMOL/L (ref 5–15)
ANION GAP SERPL CALCULATED.3IONS-SCNC: 22 MMOL/L (ref 5–15)
APTT PPP: 29 SECONDS (ref 22.7–35.4)
AST SERPL-CCNC: 61 U/L (ref 1–32)
B PARAPERT DNA SPEC QL NAA+PROBE: NOT DETECTED
B PERT DNA SPEC QL NAA+PROBE: NOT DETECTED
BASOPHILS # BLD AUTO: 0.01 10*3/MM3 (ref 0–0.2)
BASOPHILS NFR BLD AUTO: 0.2 % (ref 0–1.5)
BILIRUB SERPL-MCNC: 0.8 MG/DL (ref 0–1.2)
BUN SERPL-MCNC: 47 MG/DL (ref 6–20)
BUN SERPL-MCNC: 49 MG/DL (ref 6–20)
BUN SERPL-MCNC: 50 MG/DL (ref 6–20)
BUN SERPL-MCNC: 53 MG/DL (ref 6–20)
BUN/CREAT SERPL: 4.8 (ref 7–25)
BUN/CREAT SERPL: 5 (ref 7–25)
BUN/CREAT SERPL: 5.1 (ref 7–25)
BUN/CREAT SERPL: 5.2 (ref 7–25)
C PNEUM DNA NPH QL NAA+NON-PROBE: NOT DETECTED
CALCIUM SPEC-SCNC: 9.4 MG/DL (ref 8.6–10.5)
CALCIUM SPEC-SCNC: 9.6 MG/DL (ref 8.6–10.5)
CALCIUM SPEC-SCNC: 9.8 MG/DL (ref 8.6–10.5)
CALCIUM SPEC-SCNC: 9.9 MG/DL (ref 8.6–10.5)
CHLORIDE SERPL-SCNC: 81 MMOL/L (ref 98–107)
CHLORIDE SERPL-SCNC: 82 MMOL/L (ref 98–107)
CO2 SERPL-SCNC: 18 MMOL/L (ref 22–29)
CO2 SERPL-SCNC: 19 MMOL/L (ref 22–29)
CO2 SERPL-SCNC: 20 MMOL/L (ref 22–29)
CO2 SERPL-SCNC: 22 MMOL/L (ref 22–29)
CREAT SERPL-MCNC: 10.5 MG/DL (ref 0.57–1)
CREAT SERPL-MCNC: 9.48 MG/DL (ref 0.57–1)
CREAT SERPL-MCNC: 9.72 MG/DL (ref 0.57–1)
CREAT SERPL-MCNC: 9.75 MG/DL (ref 0.57–1)
D-LACTATE SERPL-SCNC: 1.9 MMOL/L (ref 0.5–2)
D-LACTATE SERPL-SCNC: 2.2 MMOL/L (ref 0.5–2)
DEPRECATED RDW RBC AUTO: 52.8 FL (ref 37–54)
DEPRECATED RDW RBC AUTO: 53.1 FL (ref 37–54)
EGFRCR SERPLBLD CKD-EPI 2021: 4.6 ML/MIN/1.73
EGFRCR SERPLBLD CKD-EPI 2021: 5 ML/MIN/1.73
EGFRCR SERPLBLD CKD-EPI 2021: 5.1 ML/MIN/1.73
EGFRCR SERPLBLD CKD-EPI 2021: 5.2 ML/MIN/1.73
EOSINOPHIL # BLD AUTO: 0.01 10*3/MM3 (ref 0–0.4)
EOSINOPHIL NFR BLD AUTO: 0.2 % (ref 0.3–6.2)
ERYTHROCYTE [DISTWIDTH] IN BLOOD BY AUTOMATED COUNT: 18 % (ref 12.3–15.4)
ERYTHROCYTE [DISTWIDTH] IN BLOOD BY AUTOMATED COUNT: 18.2 % (ref 12.3–15.4)
FLUAV SUBTYP SPEC NAA+PROBE: NOT DETECTED
FLUBV RNA ISLT QL NAA+PROBE: NOT DETECTED
GEN 5 2HR TROPONIN T REFLEX: 123 NG/L
GLOBULIN UR ELPH-MCNC: 2.6 GM/DL
GLUCOSE SERPL-MCNC: 101 MG/DL (ref 65–99)
GLUCOSE SERPL-MCNC: 104 MG/DL (ref 65–99)
GLUCOSE SERPL-MCNC: 106 MG/DL (ref 65–99)
GLUCOSE SERPL-MCNC: 95 MG/DL (ref 65–99)
HADV DNA SPEC NAA+PROBE: NOT DETECTED
HCG SERPL QL: NEGATIVE
HCOV 229E RNA SPEC QL NAA+PROBE: NOT DETECTED
HCOV HKU1 RNA SPEC QL NAA+PROBE: NOT DETECTED
HCOV NL63 RNA SPEC QL NAA+PROBE: NOT DETECTED
HCOV OC43 RNA SPEC QL NAA+PROBE: NOT DETECTED
HCT VFR BLD AUTO: 34.2 % (ref 34–46.6)
HCT VFR BLD AUTO: 34.8 % (ref 34–46.6)
HGB BLD-MCNC: 10.9 G/DL (ref 12–15.9)
HGB BLD-MCNC: 11.1 G/DL (ref 12–15.9)
HMPV RNA NPH QL NAA+NON-PROBE: NOT DETECTED
HPIV1 RNA ISLT QL NAA+PROBE: NOT DETECTED
HPIV2 RNA SPEC QL NAA+PROBE: NOT DETECTED
HPIV3 RNA NPH QL NAA+PROBE: NOT DETECTED
HPIV4 P GENE NPH QL NAA+PROBE: NOT DETECTED
INR PPP: 1.15 (ref 0.9–1.1)
LIPASE SERPL-CCNC: 42 U/L (ref 13–60)
LYMPHOCYTES # BLD AUTO: 1.09 10*3/MM3 (ref 0.7–3.1)
LYMPHOCYTES NFR BLD AUTO: 24.3 % (ref 19.6–45.3)
M PNEUMO IGG SER IA-ACNC: NOT DETECTED
MCH RBC QN AUTO: 26.6 PG (ref 26.6–33)
MCH RBC QN AUTO: 26.7 PG (ref 26.6–33)
MCHC RBC AUTO-ENTMCNC: 31.9 G/DL (ref 31.5–35.7)
MCHC RBC AUTO-ENTMCNC: 31.9 G/DL (ref 31.5–35.7)
MCV RBC AUTO: 83.3 FL (ref 79–97)
MCV RBC AUTO: 83.6 FL (ref 79–97)
MONOCYTES # BLD AUTO: 0.56 10*3/MM3 (ref 0.1–0.9)
MONOCYTES NFR BLD AUTO: 12.5 % (ref 5–12)
NEUTROPHILS NFR BLD AUTO: 2.77 10*3/MM3 (ref 1.7–7)
NEUTROPHILS NFR BLD AUTO: 61.7 % (ref 42.7–76)
NT-PROBNP SERPL-MCNC: ABNORMAL PG/ML (ref 0–450)
PLATELET # BLD AUTO: 104 10*3/MM3 (ref 140–450)
PLATELET # BLD AUTO: 121 10*3/MM3 (ref 140–450)
PMV BLD AUTO: 11.6 FL (ref 6–12)
PMV BLD AUTO: 12 FL (ref 6–12)
POTASSIUM SERPL-SCNC: 3.8 MMOL/L (ref 3.5–5.2)
POTASSIUM SERPL-SCNC: 4 MMOL/L (ref 3.5–5.2)
POTASSIUM SERPL-SCNC: 4 MMOL/L (ref 3.5–5.2)
POTASSIUM SERPL-SCNC: 4.4 MMOL/L (ref 3.5–5.2)
PROT SERPL-MCNC: 6.5 G/DL (ref 6–8.5)
PROTHROMBIN TIME: 14.8 SECONDS (ref 11.7–14.2)
QT INTERVAL: 385 MS
QTC INTERVAL: 489 MS
RBC # BLD AUTO: 4.09 10*6/MM3 (ref 3.77–5.28)
RBC # BLD AUTO: 4.18 10*6/MM3 (ref 3.77–5.28)
RHINOVIRUS RNA SPEC NAA+PROBE: NOT DETECTED
RSV RNA NPH QL NAA+NON-PROBE: NOT DETECTED
SARS-COV-2 RNA NPH QL NAA+NON-PROBE: NOT DETECTED
SODIUM SERPL-SCNC: 120 MMOL/L (ref 136–145)
SODIUM SERPL-SCNC: 122 MMOL/L (ref 136–145)
TROPONIN T DELTA: -7 NG/L
TROPONIN T SERPL HS-MCNC: 117 NG/L
TROPONIN T SERPL HS-MCNC: 130 NG/L
WBC NRBC COR # BLD: 3.93 10*3/MM3 (ref 3.4–10.8)
WBC NRBC COR # BLD: 4.49 10*3/MM3 (ref 3.4–10.8)

## 2023-10-26 PROCEDURE — 25810000003 SODIUM CHLORIDE 0.9 % SOLUTION: Performed by: INTERNAL MEDICINE

## 2023-10-26 PROCEDURE — 0202U NFCT DS 22 TRGT SARS-COV-2: CPT | Performed by: EMERGENCY MEDICINE

## 2023-10-26 PROCEDURE — 99222 1ST HOSP IP/OBS MODERATE 55: CPT | Performed by: INTERNAL MEDICINE

## 2023-10-26 PROCEDURE — 25010000002 PROCHLORPERAZINE 10 MG/2ML SOLUTION: Performed by: INTERNAL MEDICINE

## 2023-10-26 PROCEDURE — 74018 RADEX ABDOMEN 1 VIEW: CPT

## 2023-10-26 PROCEDURE — 25010000002 ONDANSETRON PER 1 MG: Performed by: NURSE PRACTITIONER

## 2023-10-26 PROCEDURE — 93010 ELECTROCARDIOGRAM REPORT: CPT | Performed by: INTERNAL MEDICINE

## 2023-10-26 PROCEDURE — 85027 COMPLETE CBC AUTOMATED: CPT | Performed by: NURSE PRACTITIONER

## 2023-10-26 PROCEDURE — 63710000001 MYCOPHENOLATE MOFETIL PER 250 MG: Performed by: INTERNAL MEDICINE

## 2023-10-26 PROCEDURE — 85610 PROTHROMBIN TIME: CPT | Performed by: EMERGENCY MEDICINE

## 2023-10-26 PROCEDURE — 83880 ASSAY OF NATRIURETIC PEPTIDE: CPT | Performed by: EMERGENCY MEDICINE

## 2023-10-26 PROCEDURE — 83690 ASSAY OF LIPASE: CPT | Performed by: EMERGENCY MEDICINE

## 2023-10-26 PROCEDURE — 80053 COMPREHEN METABOLIC PANEL: CPT | Performed by: EMERGENCY MEDICINE

## 2023-10-26 PROCEDURE — 84484 ASSAY OF TROPONIN QUANT: CPT | Performed by: NURSE PRACTITIONER

## 2023-10-26 PROCEDURE — 99285 EMERGENCY DEPT VISIT HI MDM: CPT

## 2023-10-26 PROCEDURE — 84703 CHORIONIC GONADOTROPIN ASSAY: CPT | Performed by: EMERGENCY MEDICINE

## 2023-10-26 PROCEDURE — 84484 ASSAY OF TROPONIN QUANT: CPT | Performed by: EMERGENCY MEDICINE

## 2023-10-26 PROCEDURE — 25010000002 MORPHINE PER 10 MG: Performed by: EMERGENCY MEDICINE

## 2023-10-26 PROCEDURE — 93005 ELECTROCARDIOGRAM TRACING: CPT | Performed by: EMERGENCY MEDICINE

## 2023-10-26 PROCEDURE — 85730 THROMBOPLASTIN TIME PARTIAL: CPT | Performed by: EMERGENCY MEDICINE

## 2023-10-26 PROCEDURE — 25010000002 ONDANSETRON PER 1 MG: Performed by: EMERGENCY MEDICINE

## 2023-10-26 PROCEDURE — 71045 X-RAY EXAM CHEST 1 VIEW: CPT

## 2023-10-26 PROCEDURE — 83605 ASSAY OF LACTIC ACID: CPT | Performed by: EMERGENCY MEDICINE

## 2023-10-26 PROCEDURE — 85025 COMPLETE CBC W/AUTO DIFF WBC: CPT | Performed by: EMERGENCY MEDICINE

## 2023-10-26 PROCEDURE — 36415 COLL VENOUS BLD VENIPUNCTURE: CPT | Performed by: NURSE PRACTITIONER

## 2023-10-26 RX ORDER — SODIUM CHLORIDE, SODIUM LACTATE, CALCIUM CHLORIDE, MAGNESIUM CHLORIDE AND DEXTROSE 2.5; 538; 448; 18.3; 5.08 G/100ML; MG/100ML; MG/100ML; MG/100ML; MG/100ML
1500 INJECTION, SOLUTION INTRAPERITONEAL
Status: ACTIVE | OUTPATIENT
Start: 2023-10-26 | End: 2023-10-27

## 2023-10-26 RX ORDER — POLYETHYLENE GLYCOL 3350 17 G/17G
17 POWDER, FOR SOLUTION ORAL DAILY PRN
Status: DISCONTINUED | OUTPATIENT
Start: 2023-10-26 | End: 2023-11-02

## 2023-10-26 RX ORDER — DEXTROSE MONOHYDRATE, SODIUM CHLORIDE, SODIUM LACTATE, CALCIUM CHLORIDE, MAGNESIUM CHLORIDE 2.5; 538; 448; 18.4; 5.08 G/100ML; MG/100ML; MG/100ML; MG/100ML; MG/100ML
1.5 SOLUTION INTRAPERITONEAL
Status: DISCONTINUED | OUTPATIENT
Start: 2023-10-26 | End: 2023-10-28

## 2023-10-26 RX ORDER — TORSEMIDE 100 MG/1
200 TABLET ORAL DAILY
Status: DISCONTINUED | OUTPATIENT
Start: 2023-10-26 | End: 2023-10-28

## 2023-10-26 RX ORDER — MYCOPHENOLATE MOFETIL 500 MG/1
250 TABLET ORAL EVERY 12 HOURS SCHEDULED
Status: DISCONTINUED | OUTPATIENT
Start: 2023-10-26 | End: 2023-10-31

## 2023-10-26 RX ORDER — PROCHLORPERAZINE EDISYLATE 5 MG/ML
5 INJECTION INTRAMUSCULAR; INTRAVENOUS EVERY 6 HOURS PRN
Status: DISCONTINUED | OUTPATIENT
Start: 2023-10-26 | End: 2023-11-02

## 2023-10-26 RX ORDER — SODIUM CHLORIDE 0.9 % (FLUSH) 0.9 %
10 SYRINGE (ML) INJECTION EVERY 12 HOURS SCHEDULED
Status: DISCONTINUED | OUTPATIENT
Start: 2023-10-26 | End: 2023-11-02

## 2023-10-26 RX ORDER — ONDANSETRON 4 MG/1
4 TABLET, FILM COATED ORAL EVERY 6 HOURS PRN
Status: DISCONTINUED | OUTPATIENT
Start: 2023-10-26 | End: 2023-11-02

## 2023-10-26 RX ORDER — PROCHLORPERAZINE MALEATE 5 MG/1
5 TABLET ORAL EVERY 6 HOURS PRN
Status: DISCONTINUED | OUTPATIENT
Start: 2023-10-26 | End: 2023-11-02

## 2023-10-26 RX ORDER — AMOXICILLIN 250 MG
2 CAPSULE ORAL 2 TIMES DAILY
Status: DISCONTINUED | OUTPATIENT
Start: 2023-10-26 | End: 2023-11-02

## 2023-10-26 RX ORDER — PROCHLORPERAZINE 25 MG
25 SUPPOSITORY, RECTAL RECTAL EVERY 12 HOURS PRN
Status: DISCONTINUED | OUTPATIENT
Start: 2023-10-26 | End: 2023-11-02

## 2023-10-26 RX ORDER — NITROGLYCERIN 0.4 MG/1
0.4 TABLET SUBLINGUAL
Status: DISCONTINUED | OUTPATIENT
Start: 2023-10-26 | End: 2023-11-02

## 2023-10-26 RX ORDER — LABETALOL HYDROCHLORIDE 5 MG/ML
10 INJECTION, SOLUTION INTRAVENOUS
Status: DISCONTINUED | OUTPATIENT
Start: 2023-10-26 | End: 2023-11-02

## 2023-10-26 RX ORDER — BISACODYL 10 MG
10 SUPPOSITORY, RECTAL RECTAL DAILY PRN
Status: DISCONTINUED | OUTPATIENT
Start: 2023-10-26 | End: 2023-11-02

## 2023-10-26 RX ORDER — SODIUM CHLORIDE 0.9 % (FLUSH) 0.9 %
10 SYRINGE (ML) INJECTION AS NEEDED
Status: DISCONTINUED | OUTPATIENT
Start: 2023-10-26 | End: 2023-11-02

## 2023-10-26 RX ORDER — ONDANSETRON 2 MG/ML
4 INJECTION INTRAMUSCULAR; INTRAVENOUS ONCE
Status: COMPLETED | OUTPATIENT
Start: 2023-10-26 | End: 2023-10-26

## 2023-10-26 RX ORDER — ACETAMINOPHEN 650 MG/1
650 SUPPOSITORY RECTAL EVERY 4 HOURS PRN
Status: DISCONTINUED | OUTPATIENT
Start: 2023-10-26 | End: 2023-11-02

## 2023-10-26 RX ORDER — SEVELAMER CARBONATE 800 MG/1
800 TABLET, FILM COATED ORAL
Status: DISCONTINUED | OUTPATIENT
Start: 2023-10-26 | End: 2023-10-27

## 2023-10-26 RX ORDER — MORPHINE SULFATE 2 MG/ML
2 INJECTION, SOLUTION INTRAMUSCULAR; INTRAVENOUS ONCE
Status: COMPLETED | OUTPATIENT
Start: 2023-10-26 | End: 2023-10-26

## 2023-10-26 RX ORDER — ACETAMINOPHEN 325 MG/1
650 TABLET ORAL EVERY 4 HOURS PRN
Status: DISCONTINUED | OUTPATIENT
Start: 2023-10-26 | End: 2023-11-02

## 2023-10-26 RX ORDER — DOCUSATE SODIUM 100 MG/1
100 CAPSULE, LIQUID FILLED ORAL 2 TIMES DAILY
Status: DISCONTINUED | OUTPATIENT
Start: 2023-10-26 | End: 2023-11-02

## 2023-10-26 RX ORDER — CALCIUM CARBONATE 500 MG/1
2 TABLET, CHEWABLE ORAL 2 TIMES DAILY PRN
Status: DISCONTINUED | OUTPATIENT
Start: 2023-10-26 | End: 2023-11-02

## 2023-10-26 RX ORDER — HYDROXYCHLOROQUINE SULFATE 200 MG/1
200 TABLET, FILM COATED ORAL
Status: DISCONTINUED | OUTPATIENT
Start: 2023-10-26 | End: 2023-10-30

## 2023-10-26 RX ORDER — SODIUM CHLORIDE 9 MG/ML
40 INJECTION, SOLUTION INTRAVENOUS AS NEEDED
Status: DISCONTINUED | OUTPATIENT
Start: 2023-10-26 | End: 2023-11-02

## 2023-10-26 RX ORDER — MORPHINE SULFATE 2 MG/ML
4 INJECTION, SOLUTION INTRAMUSCULAR; INTRAVENOUS ONCE
Status: COMPLETED | OUTPATIENT
Start: 2023-10-26 | End: 2023-10-26

## 2023-10-26 RX ORDER — BISACODYL 5 MG/1
5 TABLET, DELAYED RELEASE ORAL DAILY PRN
Status: DISCONTINUED | OUTPATIENT
Start: 2023-10-26 | End: 2023-11-02

## 2023-10-26 RX ORDER — CARVEDILOL 25 MG/1
25 TABLET ORAL 2 TIMES DAILY WITH MEALS
Status: DISCONTINUED | OUTPATIENT
Start: 2023-10-26 | End: 2023-11-02

## 2023-10-26 RX ORDER — ACETAMINOPHEN 160 MG/5ML
650 SOLUTION ORAL EVERY 4 HOURS PRN
Status: DISCONTINUED | OUTPATIENT
Start: 2023-10-26 | End: 2023-11-02

## 2023-10-26 RX ORDER — ONDANSETRON 2 MG/ML
4 INJECTION INTRAMUSCULAR; INTRAVENOUS EVERY 6 HOURS PRN
Status: DISCONTINUED | OUTPATIENT
Start: 2023-10-26 | End: 2023-11-02

## 2023-10-26 RX ADMIN — SODIUM ZIRCONIUM CYCLOSILICATE 10 G: 10 POWDER, FOR SUSPENSION ORAL at 08:49

## 2023-10-26 RX ADMIN — TORSEMIDE 200 MG: 100 TABLET ORAL at 19:36

## 2023-10-26 RX ADMIN — CARVEDILOL 25 MG: 25 TABLET, FILM COATED ORAL at 20:09

## 2023-10-26 RX ADMIN — ONDANSETRON 4 MG: 2 INJECTION INTRAMUSCULAR; INTRAVENOUS at 11:20

## 2023-10-26 RX ADMIN — HYDROXYCHLOROQUINE SULFATE 200 MG: 200 TABLET ORAL at 11:15

## 2023-10-26 RX ADMIN — ONDANSETRON 4 MG: 2 INJECTION INTRAMUSCULAR; INTRAVENOUS at 01:54

## 2023-10-26 RX ADMIN — MORPHINE SULFATE 2 MG: 2 INJECTION, SOLUTION INTRAMUSCULAR; INTRAVENOUS at 04:05

## 2023-10-26 RX ADMIN — NIFEDIPINE 90 MG: 60 TABLET, FILM COATED, EXTENDED RELEASE ORAL at 11:14

## 2023-10-26 RX ADMIN — DEXTROSE MONOHYDRATE, SODIUM CHLORIDE, SODIUM LACTATE, CALCIUM CHLORIDE, MAGNESIUM CHLORIDE 1500 ML: 2.5; 538; 448; 18.4; 5.08 SOLUTION INTRAPERITONEAL at 20:08

## 2023-10-26 RX ADMIN — DOCUSATE SODIUM 100 MG: 100 CAPSULE, LIQUID FILLED ORAL at 08:49

## 2023-10-26 RX ADMIN — MYCOPHENOLATE MOFETIL 250 MG: 500 TABLET ORAL at 23:56

## 2023-10-26 RX ADMIN — SODIUM CHLORIDE 500 ML: 9 INJECTION, SOLUTION INTRAVENOUS at 08:49

## 2023-10-26 RX ADMIN — PROCHLORPERAZINE EDISYLATE 5 MG: 5 INJECTION INTRAMUSCULAR; INTRAVENOUS at 16:31

## 2023-10-26 RX ADMIN — SEVELAMER CARBONATE 800 MG: 800 TABLET, FILM COATED ORAL at 11:14

## 2023-10-26 RX ADMIN — MYCOPHENOLATE MOFETIL 250 MG: 500 TABLET ORAL at 14:16

## 2023-10-26 RX ADMIN — CARVEDILOL 25 MG: 25 TABLET, FILM COATED ORAL at 08:49

## 2023-10-26 RX ADMIN — Medication 10 ML: at 14:16

## 2023-10-26 RX ADMIN — MORPHINE SULFATE 4 MG: 2 INJECTION, SOLUTION INTRAMUSCULAR; INTRAVENOUS at 01:54

## 2023-10-26 RX ADMIN — SEVELAMER CARBONATE 800 MG: 800 TABLET, FILM COATED ORAL at 19:36

## 2023-10-26 RX ADMIN — SODIUM ZIRCONIUM CYCLOSILICATE 10 G: 10 POWDER, FOR SUSPENSION ORAL at 18:12

## 2023-10-26 RX ADMIN — Medication 10 ML: at 20:09

## 2023-10-26 RX ADMIN — SODIUM ZIRCONIUM CYCLOSILICATE 10 G: 10 POWDER, FOR SUSPENSION ORAL at 23:56

## 2023-10-26 NOTE — PLAN OF CARE
Goal Outcome Evaluation:            New admit for Hyponatremia.Pt complained of mild abdominal pain on arrival,resolved over shift.Abdomen slightly rounded,non-distended.Pt reports  peritoneal dialysis daily per self.A&O*4, lethargic.Lactate,trop trending down .Med rec. complete,MD to review & re order home meds.VSS.WCTM.

## 2023-10-26 NOTE — CONSULTS
[facility] Consult Note      Patient Name: Dee Herrera  : 1990  MRN: [id number]  Primary Care Physician:  Margarita Woods APRN  Referring Physician: No ref. provider found  Date of admission: 10/26/2023    Subjective     Reason for Consult: End-stage renal disease    HPI:   Dee Herrera is a 33 y.o. female with past medical history of lupus nephritis status post renal biopsy managed with mycophenolate mofetil and prednisone unable to tolerate switch to cyclophosphamide IV unfortunately with worsening renal decline and progression to end-stage renal disease initially requiring hemodialysis through a right tunneled hemodialysis catheter and later switched to peritoneal dialysis.  History of PRESS syndrome secondary to uncontrolled hypertension from lack of compliance, hyperphosphatemia, chronic normocytic anemia status post PRBC, secondary hyperparathyroidism, migraines, vitamin D deficiency,.    Her current PD prescriptions include 8  liter CCPD  with 4 exchanges 2 L per exchange and 0.5  liter  day exchange.  Patient requires most of the time a 2.5 Dianeal solution .  Ultrafiltration average about 2 L    Patient presented for onset of fatigue weakness accompanied with nausea and decreased oral intake.  Patient states that she has been eating small portions for the last couple of days she gets full easily.  Initial evaluation in the emergency department found to have hyponatremia    Nephrology consultation has been requested for further management    Review of Systems:   14 point review of systems is otherwise negative except for mentioned above on HPI    Personal History     Past Medical History:   Diagnosis Date   • Anasarca     PER CT SCAN   • Dry skin    • ESRD (end stage renal disease) on dialysis     MARCO COLE, SAT UMAIR FRASER   • History of abdominal pain    • History of anemia    • History of transfusion    • Hypertension    • Iron deficiency anemia  09/27/2021   • Lupus (systemic lupus erythematosus) 07/30/2022   • Migraine    • Other specified nutritional anemias    • Pericardial effusion    • Renal insufficiency    • Seizures     STATES LAST WAS 1/2023   • Shortness of breath     OCCASIONAL   • Vitamin D deficiency 09/27/2021       Past Surgical History:   Procedure Laterality Date   • CARDIAC CATHETERIZATION N/A 06/14/2023    Procedure: Coronary angiography;  Surgeon: Juana Taylor MD;  Location: [facility] CATH INVASIVE LOCATION;  Service: Cardiovascular;  Laterality: N/A;   • CARDIAC CATHETERIZATION N/A 06/14/2023    Procedure: Left heart cath;  Surgeon: Juana Taylor MD;  Location: [facility] CATH INVASIVE LOCATION;  Service: Cardiovascular;  Laterality: N/A;   • CARDIAC CATHETERIZATION N/A 06/14/2023    Procedure: Right Heart Cath;  Surgeon: Juana Taylor MD;  Location: [facility] CATH INVASIVE LOCATION;  Service: Cardiovascular;  Laterality: N/A;   • COLONOSCOPY N/A 7/20/2023    Procedure: COLONOSCOPY to cecum with biopsy;  Surgeon: Drew Kaminski MD;  Location: [facility] ENDOSCOPY;  Service: Gastroenterology;  Laterality: N/A;  PRE - diarrhea, constipation  POST - fair prep, normal   • ENDOSCOPY N/A 7/20/2023    Procedure: ESOPHAGOGASTRODUODENOSCOPY with biopsy;  Surgeon: Drew Kaminski MD;  Location: [facility] ENDOSCOPY;  Service: Gastroenterology;  Laterality: N/A;  PRE - abn ct abd  POST - gastritis   • INSERTION HEMODIALYSIS CATHETER N/A 07/26/2022    Procedure: RIGHT TUNNELED DIALYSIS CATHETER PLACEMENT;  Surgeon: Diandra Adhikari MD;  Location: [facility] OR;  Service: Vascular;  Laterality: N/A;   • INSERTION PERITONEAL DIALYSIS CATHETER N/A 04/03/2023    Procedure: INSERTION PERITONEAL DIALYSIS CATHETER LAPAROSCOPIC, omentumpexy;  Surgeon: Jemal Loyola MD;  Location: [facility] MAIN OR;  Service: General;  Laterality: N/A;   • TONSILLECTOMY         Family History: family history includes Anemia in her brother and mother; Autoimmune disease  in her mother; Cancer in her maternal grandmother; Diabetes in her maternal grandmother and sister; Hypertension in her maternal grandmother; Sickle cell anemia in her cousin.    Social History:  reports that she has quit smoking. Her smoking use included cigars. She has been exposed to tobacco smoke. She has never used smokeless tobacco. She reports current alcohol use. She reports current drug use. Drug: Marijuana.    Home Medications:  Prior to Admission medications    Medication Sig Start Date End Date Taking? Authorizing Provider   carvedilol (COREG) 25 MG tablet Take 1 tablet by mouth Every 12 (Twelve) Hours. 2/18/23  Yes Hernan Corcoran DO   famotidine (PEPCID) 20 MG tablet Take 1 tablet by mouth Daily. 2/19/23  Yes Hernan Corcoran DO   hydroxychloroquine (PLAQUENIL) 200 MG tablet Take 1 tablet by mouth Daily. 11/22/22  Yes Ivet Manzano MD   mycophenolate (CELLCEPT) 500 MG tablet Take 0.5 tablets by mouth Every 12 (Twelve) Hours. 2/18/23  Yes Hernan Corcoran DO   ondansetron (Zofran) 4 MG tablet Take 1 tablet by mouth Every 8 (Eight) Hours As Needed for Nausea or Vomiting. 4/3/23 4/2/24 Yes Jemal Loyola MD   predniSONE (DELTASONE) 10 MG tablet Take 1 tablet by mouth Daily. 2/19/23  Yes Hernan Corcoran DO   sevelamer (RENVELA) 800 MG tablet Take 1 tablet by mouth 3 (Three) Times a Day With Meals.   Yes Ivet Manzano MD   NIFEdipine XL (PROCARDIA XL) 90 MG 24 hr tablet Take 1 tablet by mouth Daily.  Patient not taking: Reported on 10/26/2023 3/27/23   Alla Verdin APRN   polyethylene glycol (MIRALAX) 17 GM/SCOOP powder Take 17 g by mouth As Needed.  Patient not taking: Reported on 10/26/2023 5/23/23   Ivet Manzano MD   potassium chloride 10 MEQ CR tablet Take 1 tablet by mouth Daily.    Ivet Manzano MD   sennosides-docusate (PERICOLACE) 8.6-50 MG per tablet Take 2 tablets by mouth Daily As Needed for Constipation.  Patient not taking: Reported on 10/26/2023 4/3/23  4/2/24  Jemal Loyola MD   simethicone (MYLICON) 80 MG chewable tablet Chew 1 tablet Every 6 (Six) Hours As Needed.  Patient not taking: Reported on 10/26/2023 2/18/23   Provider, MD Ivet       Allergies:  Allergies   Allergen Reactions   • Minoxidil Other (See Comments) and Hives     Pericardial effusion .       Objective     Vitals:   Temp:  [97.8 °F (36.6 °C)] 97.8 °F (36.6 °C)  Heart Rate:  [] 90  Resp:  [18] 18  BP: (161-173)/(105-110) 161/108  No intake or output data in the 24 hours ending 10/26/23 0734    Physical Exam:   Constitutional: Awake, alert, no acute distress.  HEENT: Sclera anicteric, no conjunctival injection  Neck: Supple, no thyromegaly, no lymphadenopathy, trachea at midline, no JVD  Respiratory: Clear to auscultation bilaterally, nonlabored respiration  Cardiovascular: RRR, no murmurs, no rubs or gallops, no carotid bruit  Gastrointestinal: Positive bowel sounds, abdomen is soft, nontender and nondistended.  PD catheter in place clean exit site  : No palpable bladder  Musculoskeletal: No edema, no clubbing or cyanosis  Psychiatric: Appropriate affect, cooperative  Neurologic: Oriented x3, moving all extremities, normal speech and mental status  Skin: Warm and dry       Scheduled Meds:     senna-docusate sodium, 2 tablet, Oral, BID  sodium chloride, 500 mL, Intravenous, Once  sodium chloride, 10 mL, Intravenous, Q12H  sodium zirconium cyclosilicate, 10 g, Oral, TID      IV Meds:        Results Reviewed:   I have personally reviewed the results from the time of this admission to 10/26/2023 07:34 EDT     Lab Results   Component Value Date    GLUCOSE 104 (H) 10/26/2023    CALCIUM 9.6 10/26/2023     (L) 10/26/2023    K 4.0 10/26/2023    CO2 19.0 (L) 10/26/2023    CL 82 (L) 10/26/2023    BUN 50 (H) 10/26/2023    CREATININE 9.72 (H) 10/26/2023    EGFRIFAFRI 107 06/25/2021    BCR 5.1 (L) 10/26/2023    ANIONGAP 21.0 (H) 10/26/2023      Lab Results   Component Value  Date    MG 1.9 02/28/2023    PHOS 3.9 02/18/2023    ALBUMIN 3.9 10/26/2023           Assessment / Plan       Hyponatremia      ASSESSMENT:    -End-stage renal disease on nocturnal peritoneal dialysis. ( Dianeal solution 2.5 %  8 liters , 4 exchanges of 2 liter  + 0.5 liter day exchange ) .  No acute indication for PD we will hold treatment in the next 24 hours    -Hypovolemic acute hyponatremia.  Clinically the patient feels thirsty w persistent nausea , no evidence of volume overload.  We will attempt 500 cc bolus and repeat sodium levels, and further recommendation will follow based on clinical response.  Patient has not been eating, I hoped holding PD for the next 24 hours, but allowed her to eat more protein  And portions. .  Awaiting hyponatremia work-up including serum osmolality, urine sodium, and urine osmolarity    -Hypertension secondary to end-stage renal disease.  We will continue carvedilol and nifedipine.  Continue heart healthy diet     -Hyperphosphatemia continue Renvela with meals, and will follow phosphorus trend    -Lupus nephritis on  mycophenolate mofetil ,prednisone and Plaquenil     -Chronic normocytic anemia history of PRBC secondary to immunosuppressant and end-stage renal disease her hemoglobin is currently stable no need for YULIANA.  We will follow closely.  No evidence of active bleeding       PLAN:  -We will hold PD in the next 24 hours.  Patient states that she has been eating poorly and feeling nauseated.  Hoping that having an empty abdomen will allow her to eat better and increase portion size  -We will order 500 mL of NSS , while completing work-up for hyponatremia , order repeat BMP at noon time  -Based on clinical response further recommendation will follow  -We will obtain a KUB to evaluate position of PD catheter  -Asked nurse staff to empty abdomen and PD fluid    Discussed with nursing staff    Thank you for involving us in the care of Dee Herrera.  Please feel free to call  with any questions.    Hair Mckeon MD  10/26/23  07:34 EDT    [facility]  [phone]      Please note that portions of this note were completed with a voice recognition program.

## 2023-10-26 NOTE — ED PROVIDER NOTES
EMERGENCY DEPARTMENT ENCOUNTER    Room Number:  16/16  PCP: Margartia Woods APRN  Historian: Patient      HPI:  Chief Complaint: Shortness of breath  A complete HPI/ROS/PMH/PSH/SH/FH are unobtainable due to: None  Context: Dee Herrera is a 33 y.o. female who presents to the ED c/o shortness of air that has been steadily worsening now over the past 2 days.  She reports a history of dialysis dependent ESRD as well as asthma.  She states to use her inhalers without any improvement in symptoms.  She does report upper abdominal discomfort related.  She denies chest pain, back pain, nausea/vomiting, fever/chills, or cough.            PAST MEDICAL HISTORY  Active Ambulatory Problems     Diagnosis Date Noted    Vitamin D deficiency 09/27/2021    Iron deficiency anemia 09/27/2021    Morning stiffness of joints 04/21/2022    Iron deficiency anemia, unspecified iron deficiency anemia type 07/11/2022    Thrombocytopenia 07/20/2022    Acute renal failure (ARF) 07/20/2022    Hypertension secondary to other renal disorders 07/20/2022    Pancytopenia 07/20/2022    Hypoalbuminemia 07/20/2022    Volume overload 07/20/2022    Ear drainage right 07/20/2022    T.T.P. 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 difficile colitis 02/14/2023    Abdominal pain 02/18/2023    Anemia, chronic disease 02/18/2023    Essential hypertension 02/18/2023    Severe malnutrition 02/18/2023     Peritoneal dialysis catheter in place 04/03/2023    End stage renal disease 04/03/2023    Alternating constipation and diarrhea 05/23/2023    Abnormal stress test 05/26/2023     Resolved Ambulatory Problems     Diagnosis Date Noted    Anemia, unspecified type 04/21/2022    Elevated troponin 07/20/2022    Nausea and vomiting 07/20/2022    Hypertensive urgency 07/20/2022    Combined systolic and diastolic congestive heart failure 12/08/2022    Pericardial effusion 02/01/2023    Amyloid disease 02/03/2023    Hypertensive urgency 02/14/2023     Past Medical History:   Diagnosis Date    Anasarca     Dry skin     ESRD (end stage renal disease) on dialysis     History of abdominal pain     History of anemia     History of transfusion     Hypertension     Lupus (systemic lupus erythematosus) 07/30/2022    Migraine     Other specified nutritional anemias     Renal insufficiency     Seizures     Shortness of breath          PAST SURGICAL HISTORY  Past Surgical History:   Procedure Laterality Date    CARDIAC CATHETERIZATION N/A 06/14/2023    Procedure: Coronary angiography;  Surgeon: Juana Taylor MD;  Location: [facility] CATH INVASIVE LOCATION;  Service: Cardiovascular;  Laterality: N/A;    CARDIAC CATHETERIZATION N/A 06/14/2023    Procedure: Left heart cath;  Surgeon: Juana Taylor MD;  Location: [facility] CATH INVASIVE LOCATION;  Service: Cardiovascular;  Laterality: N/A;    CARDIAC CATHETERIZATION N/A 06/14/2023    Procedure: Right Heart Cath;  Surgeon: Juana Taylor MD;  Location: [facility] CATH INVASIVE LOCATION;  Service: Cardiovascular;  Laterality: N/A;    COLONOSCOPY N/A 7/20/2023    Procedure: COLONOSCOPY to cecum with biopsy;  Surgeon: Drew Kaminski MD;  Location: [facility] ENDOSCOPY;  Service: Gastroenterology;  Laterality: N/A;  PRE - diarrhea, constipation  POST - fair prep, normal    ENDOSCOPY N/A 7/20/2023    Procedure: ESOPHAGOGASTRODUODENOSCOPY with biopsy;  Surgeon: Drew Kaminski MD;  Location: [facility]  ENDOSCOPY;  Service: Gastroenterology;  Laterality: N/A;  PRE - abn ct abd  POST - gastritis    INSERTION HEMODIALYSIS CATHETER N/A 07/26/2022    Procedure: RIGHT TUNNELED DIALYSIS CATHETER PLACEMENT;  Surgeon: Diandra Adhkiari MD;  Location: [facility] MAIN OR;  Service: Vascular;  Laterality: N/A;    INSERTION PERITONEAL DIALYSIS CATHETER N/A 04/03/2023    Procedure: INSERTION PERITONEAL DIALYSIS CATHETER LAPAROSCOPIC, omentumpexy;  Surgeon: Jemal Loyola MD;  Location: [facility] MAIN OR;  Service: General;  Laterality: N/A;    TONSILLECTOMY           FAMILY HISTORY  Family History   Problem Relation Age of Onset    Autoimmune disease Mother     Anemia Mother     Diabetes Sister     Anemia Brother     Diabetes Maternal Grandmother     Hypertension Maternal Grandmother     Cancer Maternal Grandmother     Sickle cell anemia Cousin     Malig Hyperthermia Neg Hx          SOCIAL HISTORY  Social History     Socioeconomic History    Marital status: Single   Tobacco Use    Smoking status: Former     Types: Cigars     Passive exposure: Past    Smokeless tobacco: Never    Tobacco comments:     Patient smoked black & mild   Vaping Use    Vaping Use: Never used   Substance and Sexual Activity    Alcohol use: Yes     Comment: social    Drug use: Yes     Types: Marijuana     Comment: OCCASIONAL    Sexual activity: Not Currently     Partners: Male     Birth control/protection: None         ALLERGIES  Minoxidil        REVIEW OF SYSTEMS  Review of Systems   Constitutional:  Negative for fever.   HENT:  Negative for sore throat.    Eyes: Negative.    Respiratory:  Positive for shortness of breath. Negative for cough.    Cardiovascular:  Negative for chest pain.   Gastrointestinal:  Positive for abdominal pain. Negative for diarrhea and vomiting.   Genitourinary:  Negative for dysuria.   Musculoskeletal:  Negative for neck pain.   Skin:  Negative for rash.   Allergic/Immunologic: Negative.    Neurological:  Negative for  weakness, numbness and headaches.   Hematological: Negative.    Psychiatric/Behavioral: Negative.     All other systems reviewed and are negative.         PHYSICAL EXAM  ED Triage Vitals [10/26/23 0012]   Temp Pulse Resp BP SpO2   97.8 °F (36.6 °C) -- 18 -- --      Temp src Heart Rate Source Patient Position BP Location FiO2 (%)   Tympanic -- -- -- --       Physical Exam  Constitutional:       General: She is not in acute distress.     Appearance: Normal appearance. She is not ill-appearing or toxic-appearing.   HENT:      Head: Normocephalic and atraumatic.   Eyes:      Extraocular Movements: Extraocular movements intact.      Pupils: Pupils are equal, round, and reactive to light.   Cardiovascular:      Rate and Rhythm: Normal rate and regular rhythm.      Heart sounds: No murmur heard.     No friction rub. No gallop.   Pulmonary:      Effort: Pulmonary effort is normal.      Breath sounds: Normal breath sounds. Examination of the right-lower field reveals rhonchi. Examination of the left-lower field reveals rhonchi.   Abdominal:      General: Abdomen is flat. There is no distension.      Palpations: Abdomen is soft.      Tenderness: There is no abdominal tenderness.   Musculoskeletal:         General: No swelling or tenderness. Normal range of motion.      Cervical back: Normal range of motion and neck supple.   Skin:     General: Skin is warm and dry.   Neurological:      General: No focal deficit present.      Mental Status: She is alert and oriented to person, place, and time.      Sensory: No sensory deficit.      Motor: No weakness.   Psychiatric:         Mood and Affect: Mood normal.         Behavior: Behavior normal.         Vital signs and nursing notes reviewed.          LAB RESULTS  Recent Results (from the past 24 hour(s))   Comprehensive Metabolic Panel    Collection Time: 10/26/23 12:54 AM    Specimen: Blood   Result Value Ref Range    Glucose 101 (H) 65 - 99 mg/dL    BUN 47 (H) 6 - 20 mg/dL     Creatinine 9.75 (H) 0.57 - 1.00 mg/dL    Sodium 122 (L) 136 - 145 mmol/L    Potassium 3.8 3.5 - 5.2 mmol/L    Chloride 82 (L) 98 - 107 mmol/L    CO2 22.0 22.0 - 29.0 mmol/L    Calcium 9.9 8.6 - 10.5 mg/dL    Total Protein 6.5 6.0 - 8.5 g/dL    Albumin 3.9 3.5 - 5.2 g/dL    ALT (SGPT) 71 (H) 1 - 33 U/L    AST (SGOT) 61 (H) 1 - 32 U/L    Alkaline Phosphatase 116 39 - 117 U/L    Total Bilirubin 0.8 0.0 - 1.2 mg/dL    Globulin 2.6 gm/dL    A/G Ratio 1.5 g/dL    BUN/Creatinine Ratio 4.8 (L) 7.0 - 25.0    Anion Gap 18.0 (H) 5.0 - 15.0 mmol/L    eGFR 5.0 (L) >60.0 mL/min/1.73   Lactic Acid, Plasma    Collection Time: 10/26/23 12:54 AM    Specimen: Blood   Result Value Ref Range    Lactate 2.2 (C) 0.5 - 2.0 mmol/L   High Sensitivity Troponin T    Collection Time: 10/26/23 12:54 AM    Specimen: Blood   Result Value Ref Range    HS Troponin T 130 (C) <10 ng/L   BNP    Collection Time: 10/26/23 12:54 AM    Specimen: Blood   Result Value Ref Range    proBNP >70,000.0 (H) 0.0 - 450.0 pg/mL   Protime-INR    Collection Time: 10/26/23 12:54 AM    Specimen: Blood   Result Value Ref Range    Protime 14.8 (H) 11.7 - 14.2 Seconds    INR 1.15 (H) 0.90 - 1.10   aPTT    Collection Time: 10/26/23 12:54 AM    Specimen: Blood   Result Value Ref Range    PTT 29.0 22.7 - 35.4 seconds   hCG, Serum, Qualitative    Collection Time: 10/26/23 12:54 AM    Specimen: Blood   Result Value Ref Range    HCG Qualitative Negative Negative   Lipase    Collection Time: 10/26/23 12:54 AM    Specimen: Blood   Result Value Ref Range    Lipase 42 13 - 60 U/L   CBC Auto Differential    Collection Time: 10/26/23 12:54 AM    Specimen: Blood   Result Value Ref Range    WBC 4.49 3.40 - 10.80 10*3/mm3    RBC 4.18 3.77 - 5.28 10*6/mm3    Hemoglobin 11.1 (L) 12.0 - 15.9 g/dL    Hematocrit 34.8 34.0 - 46.6 %    MCV 83.3 79.0 - 97.0 fL    MCH 26.6 26.6 - 33.0 pg    MCHC 31.9 31.5 - 35.7 g/dL    RDW 18.2 (H) 12.3 - 15.4 %    RDW-SD 53.1 37.0 - 54.0 fl    MPV 11.6 6.0 -  12.0 fL    Platelets 121 (L) 140 - 450 10*3/mm3    Neutrophil % 61.7 42.7 - 76.0 %    Lymphocyte % 24.3 19.6 - 45.3 %    Monocyte % 12.5 (H) 5.0 - 12.0 %    Eosinophil % 0.2 (L) 0.3 - 6.2 %    Basophil % 0.2 0.0 - 1.5 %    Neutrophils, Absolute 2.77 1.70 - 7.00 10*3/mm3    Lymphocytes, Absolute 1.09 0.70 - 3.10 10*3/mm3    Monocytes, Absolute 0.56 0.10 - 0.90 10*3/mm3    Eosinophils, Absolute 0.01 0.00 - 0.40 10*3/mm3    Basophils, Absolute 0.01 0.00 - 0.20 10*3/mm3   ECG 12 Lead Dyspnea    Collection Time: 10/26/23 12:54 AM   Result Value Ref Range    QT Interval 385 ms    QTC Interval 489 ms   Respiratory Panel PCR w/COVID-19(SARS-CoV-2) [facility]EL In-House, NP Swab in [facility], 3-4 HR TAT - Swab, Nasopharynx    Collection Time: 10/26/23 12:57 AM    Specimen: Nasopharynx; Swab   Result Value Ref Range    ADENOVIRUS, PCR Not Detected Not Detected    Coronavirus 229E Not Detected Not Detected    Coronavirus HKU1 Not Detected Not Detected    Coronavirus NL63 Not Detected Not Detected    Coronavirus OC43 Not Detected Not Detected    COVID19 Not Detected Not Detected - Ref. Range    Human Metapneumovirus Not Detected Not Detected    Human Rhinovirus/Enterovirus Not Detected Not Detected    Influenza A PCR Not Detected Not Detected    Influenza B PCR Not Detected Not Detected    Parainfluenza Virus 1 Not Detected Not Detected    Parainfluenza Virus 2 Not Detected Not Detected    Parainfluenza Virus 3 Not Detected Not Detected    Parainfluenza Virus 4 Not Detected Not Detected    RSV, PCR Not Detected Not Detected    Bordetella pertussis pcr Not Detected Not Detected    Bordetella parapertussis PCR Not Detected Not Detected    Chlamydophila pneumoniae PCR Not Detected Not Detected    Mycoplasma pneumo by PCR Not Detected Not Detected   High Sensitivity Troponin T 2Hr    Collection Time: 10/26/23  3:14 AM    Specimen: Blood   Result Value Ref Range    HS Troponin T 123 (C) <10 ng/L    Troponin T Delta -7  (L) >=-4 - <+4 ng/L   STAT Lactic Acid, Reflex    Collection Time: 10/26/23  3:53 AM    Specimen: Arm, Right; Blood   Result Value Ref Range    Lactate 1.9 0.5 - 2.0 mmol/L       Ordered the above labs and reviewed the results.        RADIOLOGY  XR Chest 1 View    Result Date: 10/26/2023  Patient: LUZ ELENA TRINIDAD  Time Out: 02:09 Exam(s): XR CXR 1 VIEW EXAM:   XR Chest, 1 View CLINICAL HISTORY:    Reason for exam: soa. TECHNIQUE:   Frontal view of the chest. COMPARISON:   Chest x-ray 2 13 2023 FINDINGS:   Lungs:  No consolidation. No overt edema.   Pleural space:  No pleural effusion. No pneumothorax.   Heart:  Cardiomegaly. IMPRESSION:       Cardiomegaly.     Electronically signed by Amari Romero MD on 10-26-23 at 0209     Ordered the above noted radiological studies. Reviewed by me in PACS.            PROCEDURES  Procedures    EKG independently interpreted by myself as follows:    EKG          EKG time: 0054  Rhythm/Rate: NSR, 97  P waves and ND: nml  QRS, axis: nml, LAD   ST and T waves: Nonspecific ST/T changes    Interpreted Contemporaneously by me, independently viewed  changed compared to prior 2/13/23            MEDICATIONS GIVEN IN ER  Medications   sodium chloride 0.9 % flush 10 mL (has no administration in time range)   sodium chloride 0.9 % flush 10 mL (has no administration in time range)   sodium chloride 0.9 % flush 10 mL (has no administration in time range)   sodium chloride 0.9 % infusion 40 mL (has no administration in time range)   sennosides-docusate (PERICOLACE) 8.6-50 MG per tablet 2 tablet (has no administration in time range)     And   polyethylene glycol (MIRALAX) packet 17 g (has no administration in time range)     And   bisacodyl (DULCOLAX) EC tablet 5 mg (has no administration in time range)     And   bisacodyl (DULCOLAX) suppository 10 mg (has no administration in time range)   nitroglycerin (NITROSTAT) SL tablet 0.4 mg (has no administration in time range)   acetaminophen (TYLENOL)  tablet 650 mg (has no administration in time range)     Or   acetaminophen (TYLENOL) 160 MG/5ML oral solution 650 mg (has no administration in time range)     Or   acetaminophen (TYLENOL) suppository 650 mg (has no administration in time range)   ondansetron (ZOFRAN) tablet 4 mg (has no administration in time range)     Or   ondansetron (ZOFRAN) injection 4 mg (has no administration in time range)   calcium carbonate (TUMS) chewable tablet 500 mg (200 mg elemental) (has no administration in time range)   ondansetron (ZOFRAN) injection 4 mg (4 mg Intravenous Given 10/26/23 0154)   morphine injection 4 mg (4 mg Intravenous Given 10/26/23 0154)   morphine injection 2 mg (2 mg Intravenous Given 10/26/23 0405)                   MEDICAL DECISION MAKING, PROGRESS, and CONSULTS    All labs have been independently reviewed by me.  All radiology studies have been reviewed by me and I have also reviewed the radiology report.   EKG's independently viewed and interpreted by me.  Discussion below represents my analysis of pertinent findings related to patient's condition, differential diagnosis, treatment plan and final disposition.      Additional sources:  - Discussed/ obtained information from independent historians: History obtained from the patient herself at bedside.    - External (non-ED) record review: Upon medical records review, the patient was last seen and evaluated in the outpatient office of primary care on 3/27/2023 in follow-up for uncontrolled hypertension as well as her known ESRD history.    - Chronic or social conditions impacting care: Dialysis dependent ESRD    - Shared decision making: Admission decision based on shared conversations have between myself, the patient at bedside, as well as CAESAR Seals for [facility].      Orders placed during this visit:  Orders Placed This Encounter   Procedures    Respiratory Panel PCR w/COVID-19(SARS-CoV-2) [facility] In-House, NP Swab in [facility], 3-4  HR TAT - Swab, Nasopharynx    XR Chest 1 View    Comprehensive Metabolic Panel    Lactic Acid, Plasma    High Sensitivity Troponin T    BNP    Protime-INR    aPTT    hCG, Serum, Qualitative    Lipase    CBC Auto Differential    High Sensitivity Troponin T 2Hr    STAT Lactic Acid, Reflex    CBC (No Diff)    Basic Metabolic Panel    Basic Metabolic Panel    High Sensitivity Troponin T    Osmolality, Urine - Urine, Clean Catch    Chloride, Urine, Random - Urine, Clean Catch    Sodium, Urine, Random - Urine, Clean Catch    Diet: Renal Diets; Low Sodium (2-3g), Low Potassium, Low Phosphorus; Texture: Regular Texture (IDDSI 7); Fluid Consistency: Thin (IDDSI 0)    Vital Signs    Intake & Output    Weigh Patient    Oral Care    Place Sequential Compression Device    Maintain Sequential Compression Device    Telemetry - Maintain IV Access    Telemetry - Place Orders & Notify Provider of Results When Patient Experiences Acute Chest Pain, Dysrhythmia or Respiratory Distress    May Be Off Telemetry for Tests    Code Status and Medical Interventions:    [facility] (on-call MD unless specified) Details    Inpatient Nephrology Consult    ECG 12 Lead Dyspnea    Insert Peripheral IV    Insert Peripheral IV    Inpatient Admission    CBC & Differential             Differential diagnosis includes but is not limited to:    Differential diagnosis includes but is not limited to asthma with acute exacerbation, pneumonia, acute bronchitis, COVID-19, viral upper respiratory infection, volume overload, or CHF with pulmonary edema.      Independent interpretation of labs, radiology studies, and discussions with consultants:    Chest x-ray was independently interpreted by myself with my interpretation showing mild cardiomegaly without edema, infiltrate, or pneumothorax.        ED Course as of 10/26/23 0459   Thu Oct 26, 2023   2365 On reevaluation, the patient is resting comfortably and without acute complaint.  She does report that her pain has  improved as well.  I did inform her that her sodium level is markedly low.  We will admit her to the hospital for further evaluation and treatment and have her nephrologist to evaluate for further planning.  She reports that she does see Dr. Hernandez for nephrology.  All questions have been answered. [BM]   0429 The patient's presentation, work-up, as well as diagnosis and treatment plan was discussed at length with CAESAR Seals for [facility].  She agrees to admit the patient to the hospital today for Dr. Carter. [BM]      ED Course User Index  [BM] Randall Trinh MD             DIAGNOSIS  Final diagnoses:   Shortness of breath   ESRD (end stage renal disease) on dialysis   Hyponatremia   Generalized abdominal pain   Elevated lactic acid level   Elevated troponin         DISPOSITION  ADMISSION    Discussed treatment plan and reason for admission with pt/family and admitting physician.  Pt/family voiced understanding of the plan for admission for further testing/treatment as needed.                 Latest Documented Vital Signs:  As of 04:59 EDT  BP- (!) 161/108 HR- 90 Temp- 97.8 °F (36.6 °C) (Tympanic) O2 sat- 100%              --    Please note that portions of this were completed with a voice recognition program.       Note Disclaimer: At [facility], we believe that sharing information builds trust and better relationships. You are receiving this note because you are receiving care at [facility] or recently visited. It is possible you will see health information before a provider has talked with you about it. This kind of information can be easy to misunderstand. To help you fully understand what it means for your health, we urge you to discuss this note with your provider.             Randall Trinh MD  10/26/23 3686

## 2023-10-26 NOTE — H&P
[facility]  HISTORY AND PHYSICAL    Patient Name: Dee Herrera  : 1990  MRN: [id number]  Primary Care Physician: Margarita Woods APRN  Date of admission: 10/26/2023    Subjective   Subjective   Chief Complaint:  Shortness of breath and weakness    HPI:  Dee Herrera is a 33 y.o. female presents to the hospital with moderate shortness of breath that is worse with exertion and improved with rest.  Patient is on peritoneal dialysis for end-stage renal disease.  She also notes she has history of asthma.  She is not improving with her inhalers.  She is having some abdominal pain.  She was found to have severe hyponatremia in the emergency room and is being hospitalized for further medical management and nephrology evaluation.  She continues to complain of poor appetite and general malaise.      Review of Systems   Constitutional:  Positive for fatigue. Negative for fever.   HENT: Negative.     Eyes: Negative.    Respiratory:  Positive for shortness of breath. Negative for cough.    Cardiovascular: Negative.    Gastrointestinal:  Positive for abdominal pain. Negative for diarrhea.   Endocrine: Negative.    Genitourinary: Negative.    Musculoskeletal: Negative.    Skin: Negative.    Allergic/Immunologic: Negative.    Neurological: Negative.    Hematological: Negative.    Psychiatric/Behavioral: Negative.          All other systems reviewed and are negative.     Personal History     Past Medical History:   Diagnosis Date   • Anasarca     PER CT SCAN   • Dry skin    • ESRD (end stage renal disease) on dialysis     MARCO COLE, KATIE FRASER   • History of abdominal pain    • History of anemia    • History of transfusion    • Hypertension    • Iron deficiency anemia 2021   • Lupus (systemic lupus erythematosus) 2022   • Migraine    • Other specified nutritional anemias    • Pericardial effusion    • Renal insufficiency    • Seizures     STATES LAST WAS 2023   •  Shortness of breath     OCCASIONAL   • Vitamin D deficiency 09/27/2021       Past Surgical History:   Procedure Laterality Date   • CARDIAC CATHETERIZATION N/A 06/14/2023    Procedure: Coronary angiography;  Surgeon: Juana Taylor MD;  Location: [facility] CATH INVASIVE LOCATION;  Service: Cardiovascular;  Laterality: N/A;   • CARDIAC CATHETERIZATION N/A 06/14/2023    Procedure: Left heart cath;  Surgeon: Juana Taylor MD;  Location: [facility] CATH INVASIVE LOCATION;  Service: Cardiovascular;  Laterality: N/A;   • CARDIAC CATHETERIZATION N/A 06/14/2023    Procedure: Right Heart Cath;  Surgeon: Juana Taylor MD;  Location: [facility] CATH INVASIVE LOCATION;  Service: Cardiovascular;  Laterality: N/A;   • COLONOSCOPY N/A 7/20/2023    Procedure: COLONOSCOPY to cecum with biopsy;  Surgeon: Drew Kaminski MD;  Location: [facility] ENDOSCOPY;  Service: Gastroenterology;  Laterality: N/A;  PRE - diarrhea, constipation  POST - fair prep, normal   • ENDOSCOPY N/A 7/20/2023    Procedure: ESOPHAGOGASTRODUODENOSCOPY with biopsy;  Surgeon: Drew Kaminski MD;  Location: [facility] ENDOSCOPY;  Service: Gastroenterology;  Laterality: N/A;  PRE - abn ct abd  POST - gastritis   • INSERTION HEMODIALYSIS CATHETER N/A 07/26/2022    Procedure: RIGHT TUNNELED DIALYSIS CATHETER PLACEMENT;  Surgeon: Diandra Adhikari MD;  Location: [facility] OR;  Service: Vascular;  Laterality: N/A;   • INSERTION PERITONEAL DIALYSIS CATHETER N/A 04/03/2023    Procedure: INSERTION PERITONEAL DIALYSIS CATHETER LAPAROSCOPIC, omentumpexy;  Surgeon: Jemal Loyola MD;  Location: [facility] MAIN OR;  Service: General;  Laterality: N/A;   • TONSILLECTOMY         Family History: family history includes Anemia in her brother and mother; Autoimmune disease in her mother; Cancer in her maternal grandmother; Diabetes in her maternal grandmother and sister; Hypertension in her maternal grandmother; Sickle cell anemia in her cousin. Other pertinent FHx was reviewed and  unremarkable.     Social History:  reports that she has quit smoking. Her smoking use included cigars. She has been exposed to tobacco smoke. She has never used smokeless tobacco. She reports current alcohol use. She reports current drug use. Drug: Marijuana.  Social History     Social History Narrative   • Not on file       Medications:  Available home medication information reviewed.    Allergies   Allergen Reactions   • Minoxidil Other (See Comments) and Hives     Pericardial effusion .       Objective   Objective   Vital Signs:   Temp:  [97.3 °F (36.3 °C)-97.8 °F (36.6 °C)] 97.3 °F (36.3 °C)  Heart Rate:  [] 101  Resp:  [18] 18  BP: (161-184)/(105-110) 184/106        Physical Exam   Constitutional: Awake, alert, somewhat chronically ill-appearing but nontoxic  Eyes: PERRLA, sclerae anicteric, no conjunctival injection  HENT: NCAT, mucous membranes moist  Neck: Supple, no thyromegaly, no lymphadenopathy, trachea midline  Respiratory: No cough or wheezing, breathing appears nonlabored, respiratory rate is normal, relatively good inspiration  Cardiovascular: Pulse rate is borderline tachycardic, palpable radial pulse bilaterally  Gastrointestinal:  soft, nontender, nondistended  Musculoskeletal: BMI is 19, thin with muscle wasting noted, mild to moderate bilateral ankle edema  Psychiatric: Mildly anxious affect, cooperative  Neurologic: Oriented, no slurred speech or facial droop, conversational, and able to follow commands, moves extremities  Skin: No rashes or jaundice      Results from last 7 days   Lab Units 10/26/23  0649 10/26/23  0054   WBC 10*3/mm3 3.93 4.49   HEMOGLOBIN g/dL 10.9* 11.1*   HEMATOCRIT % 34.2 34.8   PLATELETS 10*3/mm3 104* 121*   INR   --  1.15*     Results from last 7 days   Lab Units 10/26/23  0649 10/26/23  0353 10/26/23  0314 10/26/23  0054   SODIUM mmol/L 122*  --  122* 122*   POTASSIUM mmol/L 4.0  --  4.0 3.8   CHLORIDE mmol/L 82*  --  82* 82*   CO2 mmol/L 19.0*  --  18.0* 22.0    BUN mg/dL 50*  --  49* 47*   CREATININE mg/dL 9.72*  --  9.48* 9.75*   GLUCOSE mg/dL 104*  --  95 101*   CALCIUM mg/dL 9.6  --  9.8 9.9   ALK PHOS U/L  --   --   --  116   ALT (SGPT) U/L  --   --   --  71*   AST (SGOT) U/L  --   --   --  61*   HSTROP T ng/L 117*  --  123* 130*   PROBNP pg/mL  --   --   --  >70,000.0*   LACTATE mmol/L  --  1.9  --  2.2*     Estimated Creatinine Clearance: 6.9 mL/min (A) (by C-G formula based on SCr of 9.72 mg/dL (H)).  Brief Urine Lab Results  (Last result in the past 365 days)        Color   Clarity   Blood   Leuk Est   Nitrite   Protein   CREAT   Urine HCG        11/21/22 2059 Yellow   CLEAR   LARGE   NEGATIVE   Neg   >=300                 Imaging Results (Last 24 Hours)       Procedure Component Value Units Date/Time    XR Abdomen KUB [[id number]] Collected: 10/26/23 0826     Updated: 10/26/23 0830    Narrative:      XR ABDOMEN KUB-     INDICATIONS: Nausea     TECHNIQUE: SUPINE VIEWS OF THE ABDOMEN     COMPARISON: 7/22/2022     FINDINGS:     A peritoneal dialysis catheter is apparent, at the level of the pelvis.  The bowel gas pattern is nonobstructive. No supine evidence for free  intraperitoneal gas. No definite nephrolithiasis. If further imaging  evaluation is indicated, CT could be obtained.       Impression:         As described.        This report was finalized on 10/26/2023 8:27 AM by Dr. Norm Arshad M.D on Workstation: [employer]       XR Chest 1 View [[id number]] Collected: 10/26/23 0210     Updated: 10/26/23 0210    Narrative:        Patient: LUZ ELENA TRINIDAD  Time Out: 02:09  Exam(s): XR CXR 1 VIEW     EXAM:    XR Chest, 1 View    CLINICAL HISTORY:     Reason for exam: soa.    TECHNIQUE:    Frontal view of the chest.    COMPARISON:    Chest x-ray 2 13 2023    FINDINGS:    Lungs:  No consolidation. No overt edema.    Pleural space:  No pleural effusion. No pneumothorax.    Heart:  Cardiomegaly.    IMPRESSION:         Cardiomegaly.      Impression:           Electronically signed by Amari Romero MD on 10-26-23 at 0209          Results for orders placed during the hospital encounter of 02/13/23    Adult Transthoracic Echo Complete W/ Cont if Necessary Per Protocol    Interpretation Summary  •  Left ventricular systolic function is normal. Calculated left ventricular EF = 59.1%  •  Left ventricular wall thickness is consistent with severe concentric hypertrophy.  •  Differential diagnosis includes infiltrative cardiomyopathy, long-standing HTN and/or chronic renal insufficiency. Apical sparing on strain imaging is present, consistent with amyloid heart disease, suggest clinical correlation  •  Left ventricular diastolic function is consistent with (grade II w/high LAP) pseudonormalization.  •  There is moderate, bileaflet mitral valve thickening present.  •  There is a moderate-large circumferential pericardial effusion; there is somewhat greater effusion posteriorly. There is no evidence of cardiac tamponade.  •  Elevated left atrial pressure.      Assessment & Plan   Assessment & Plan     Active Hospital Problems    Diagnosis  POA   • **Hyponatremia [E87.1]  Yes   • Poor appetite [R63.0]  Yes   • Moderate malnutrition [E44.0]  Yes   • Pulmonary hypertension [I27.20]  Yes   • Chronic diastolic CHF (congestive heart failure) [I50.32]  Yes   • Anemia due to chronic kidney disease, on chronic dialysis [N18.6, D63.1, Z99.2]  Not Applicable   • Peritoneal dialysis catheter in place [Z99.2]  Not Applicable   • Abdominal pain [R10.9]  Yes   • Essential hypertension [I10]  Yes   • End stage renal disease on dialysis [N18.6, Z99.2]  Not Applicable   • Seizure disorder [G40.909]  Yes   • Elevated liver function tests [R79.89]  Yes   • Pericardial effusion [I31.39]  Yes   • Systemic lupus erythematosus [M32.9]  Yes   • Thrombocytopenia [D69.6]  Yes   • Hypertension secondary to other renal disorders [I15.1]  Yes   • Vitamin D deficiency [E55.9]  Yes     33-year-old female  presents to the hospital with weakness and shortness of breath found to have severe hyponatremia.    Hyponatremia:  Consult nephrology.  Nephrology will manage dialysis to help with electrolyte levels and has ordered IV fluid.    Hypertensive urgency with essential hypertension:  Restart home medications.  Labetalol as needed for greater than 180.    End-stage renal disease on peritoneal dialysis:  Nephrology consult.  Dialyze as needed.  Electrolytes reviewed and patient has metabolic acidosis.    Chronic diastolic heart failure with pulmonary hypertension with pericardial effusion:  Continue home cardiac medications.  Consult cardiology, sees Dr. Taylor.  In the setting of shortness of breath and known pericardial effusion plan to recheck echocardiogram to reevaluate.    Malnutrition and poor appetite:  Zofran as needed for nausea.  Nutrition consult and maximize nutrition is much as needed.    Chronic abdominal pain: Possibly related to heart failure, electrolyte abnormalities, or pulmonary hypertension.  Chronic issue now symptoms resolved.  Abdominal exam is benign.    Thrombocytopenia: Chronic issue.  No bleeding noted.    Lupus:, Immunosuppressed: Continue Plaquenil and CellCept.  Monitor carefully    Anemia of chronic kidney disease: Trend hemoglobin.    Seizure disorder: Denies any recent seizures for several years.  Monitor.      DVT prophylaxis: Mechanical    CODE STATUS:    Code Status and Medical Interventions:   Ordered at: 10/26/23 0434     Code Status (Patient has no pulse and is not breathing):    CPR (Attempt to Resuscitate)     Medical Interventions (Patient has pulse or is breathing):    Full Support         Mike Lezama MD  10/26/23

## 2023-10-26 NOTE — CONSULTS
Nutrition Services    Patient Name:  Dee Herrera  YOB: 1990  MRN: [id number]  Admit Date:  10/26/2023    Assessment Date:  10/26/23    Summary: Malnutrition Severity Assessment Nutrition Consult/Chronic poor po  This is a 32 yo female who present with SOA . She reports of abd pain with decrease appetite x 1 week/wt loss, and  hyponatremia noted on admission. She has a hx of ESRD on peritoneal dialysis    Patient meets ASPEN/AND criteria for nutrition diagnosis of moderate malnutrition of acute illness based on: wt loss(5lb) and decrease po intake.    Visited pt during breakfast meal but she was not interested in eating anything.  I offered supplements but she is not interested at this time.    Plan/Recommendation  Good po intake encouraged  Will send supplement tomorrow and encourage intake.  Will continue to monitor    CLINICAL NUTRITION ASSESSMENT      Reason for Assessment Malnutrition Severity Assessment (MSA)     Diagnosis/Problem   SOA, hyponatremia, ESRD on dialysis, abd pain   Medical/Surgical History Past Medical History:   Diagnosis Date    Anasarca     PER CT SCAN    Dry skin     ESRD (end stage renal disease) on dialysis     MARCO COLE, KATIE FRASER    History of abdominal pain     History of anemia     History of transfusion     Hypertension     Iron deficiency anemia 09/27/2021    Lupus (systemic lupus erythematosus) 07/30/2022    Migraine     Other specified nutritional anemias     Pericardial effusion     Renal insufficiency     Seizures     STATES LAST WAS 1/2023    Shortness of breath     OCCASIONAL    Vitamin D deficiency 09/27/2021       Past Surgical History:   Procedure Laterality Date    CARDIAC CATHETERIZATION N/A 06/14/2023    Procedure: Coronary angiography;  Surgeon: Juana Taylor MD;  Location: [facility] INVASIVE LOCATION;  Service: Cardiovascular;  Laterality: N/A;    CARDIAC CATHETERIZATION N/A 06/14/2023    Procedure: Left heart cath;  Surgeon:  "Juana Taylor MD;  Location: [facility] CATH INVASIVE LOCATION;  Service: Cardiovascular;  Laterality: N/A;    CARDIAC CATHETERIZATION N/A 06/14/2023    Procedure: Right Heart Cath;  Surgeon: Juana Taylor MD;  Location: [facility] CATH INVASIVE LOCATION;  Service: Cardiovascular;  Laterality: N/A;    COLONOSCOPY N/A 7/20/2023    Procedure: COLONOSCOPY to cecum with biopsy;  Surgeon: Drew Kaminski MD;  Location: [facility] ENDOSCOPY;  Service: Gastroenterology;  Laterality: N/A;  PRE - diarrhea, constipation  POST - fair prep, normal    ENDOSCOPY N/A 7/20/2023    Procedure: ESOPHAGOGASTRODUODENOSCOPY with biopsy;  Surgeon: Drew Kaminski MD;  Location: [facility] ENDOSCOPY;  Service: Gastroenterology;  Laterality: N/A;  PRE - abn ct abd  POST - gastritis    INSERTION HEMODIALYSIS CATHETER N/A 07/26/2022    Procedure: RIGHT TUNNELED DIALYSIS CATHETER PLACEMENT;  Surgeon: Diandra Adhikari MD;  Location: [facility] MAIN OR;  Service: Vascular;  Laterality: N/A;    INSERTION PERITONEAL DIALYSIS CATHETER N/A 04/03/2023    Procedure: INSERTION PERITONEAL DIALYSIS CATHETER LAPAROSCOPIC, omentumpexy;  Surgeon: Jemal Loyola MD;  Location: [facility] MAIN OR;  Service: General;  Laterality: N/A;    TONSILLECTOMY          Anthropometrics        Current Height  Current Weight  BMI kg/m2 Height: 165.1 cm (65\")  Weight: 52.2 kg (115 lb) (10/26/23 0012)  Body mass index is 19.14 kg/m².   Adjusted BMI (if applicable)    BMI Category Normal/Healthy (18.4 - 24.9)   Ideal Body Weight (IBW) 125lb   Usual Body Weight (UBW) 120-155   Weight Trend Loss, 5lbs recently, 35lb overall   Weight History Wt Readings from Last 30 Encounters:   10/26/23 0012 52.2 kg (115 lb)   07/20/23 1526 52.7 kg (116 lb 1.6 oz)   06/30/23 1016 54.4 kg (120 lb)   06/14/23 0734 52.2 kg (115 lb)   05/26/23 1038 57.7 kg (127 lb 3.2 oz)   05/17/23 1430 50.7 kg (111 lb 12.8 oz)   04/05/23 1533 57.2 kg (126 lb 3.2 oz)   04/03/23 0907 52.6 kg (116 lb)   03/30/23 1527 " 52.8 kg (116 lb 4.8 oz)   03/27/23 1101 55.1 kg (121 lb 6.4 oz)   03/24/23 1030 53.5 kg (118 lb)   02/17/23 0500 50.6 kg (111 lb 9.6 oz)   02/16/23 0500 52.3 kg (115 lb 4.8 oz)   02/15/23 0652 54.7 kg (120 lb 9.5 oz)   02/14/23 1700 54.4 kg (120 lb)   02/14/23 0445 54.8 kg (120 lb 13 oz)   02/13/23 1058 54.4 kg (120 lb)   02/13/23 0936 54.5 kg (120 lb 3.2 oz)   02/03/23 1134 53.1 kg (117 lb)   02/03/23 1247 52.5 kg (115 lb 12.8 oz)   02/01/23 1709 53.2 kg (117 lb 3.2 oz)   01/11/23 1146 53.3 kg (117 lb 6.4 oz)   12/08/22 1547 62.5 kg (137 lb 12.8 oz)   11/16/22 0912 59.1 kg (130 lb 3.2 oz)   11/14/22 1358 61.1 kg (134 lb 12.8 oz)   11/02/22 0915 58.9 kg (129 lb 12.8 oz)   10/19/22 0821 60 kg (132 lb 3.2 oz)   10/17/22 0951 60.2 kg (132 lb 12.8 oz)   09/13/22 1437 67.7 kg (149 lb 3.2 oz)   08/27/22 1256 70.3 kg (155 lb)   08/03/22 1501 70.7 kg (155 lb 12.8 oz)   07/30/22 0556 70 kg (154 lb 4.8 oz)   07/28/22 0631 67.7 kg (149 lb 3.2 oz)   07/27/22 0453 75.2 kg (165 lb 12.6 oz)   07/26/22 0629 69.8 kg (153 lb 14.4 oz)   07/25/22 0533 70 kg (154 lb 4.8 oz)   07/24/22 0513 70.4 kg (155 lb 4.8 oz)   07/23/22 0316 69.6 kg (153 lb 6.4 oz)   07/22/22 0616 68 kg (149 lb 14.4 oz)   07/21/22 1653 66.7 kg (147 lb)   07/20/22 1954 66.7 kg (147 lb)   07/20/22 1420 66.7 kg (147 lb)   07/20/22 0856 66.9 kg (147 lb 6.4 oz)   07/11/22 0903 64.6 kg (142 lb 6.4 oz)   04/21/22 1112 63.6 kg (140 lb 3.2 oz)      --  Labs       Pertinent Labs    Results from last 7 days   Lab Units 10/26/23  0649 10/26/23  0314 10/26/23  0054   SODIUM mmol/L 122* 122* 122*   POTASSIUM mmol/L 4.0 4.0 3.8   CHLORIDE mmol/L 82* 82* 82*   CO2 mmol/L 19.0* 18.0* 22.0   BUN mg/dL 50* 49* 47*   CREATININE mg/dL 9.72* 9.48* 9.75*   CALCIUM mg/dL 9.6 9.8 9.9   BILIRUBIN mg/dL  --   --  0.8   ALK PHOS U/L  --   --  116   ALT (SGPT) U/L  --   --  71*   AST (SGOT) U/L  --   --  61*   GLUCOSE mg/dL 104* 95 101*     Results from last 7 days   Lab Units 10/26/23  0649  10/26/23  0054   HEMOGLOBIN g/dL 10.9* 11.1*   HEMATOCRIT % 34.2 34.8   WBC 10*3/mm3 3.93 4.49   ALBUMIN g/dL  --  3.9     Results from last 7 days   Lab Units 10/26/23  0649 10/26/23  0054   INR   --  1.15*   APTT seconds  --  29.0   PLATELETS 10*3/mm3 104* 121*     COVID19   Date Value Ref Range Status   10/26/2023 Not Detected Not Detected - Ref. Range Final     Lab Results   Component Value Date    HGBA1C 3.9 (L) 03/20/2023          Medications           Scheduled Medications carvedilol, 25 mg, Oral, BID With Meals  docusate sodium, 100 mg, Oral, BID  hydroxychloroquine, 200 mg, Oral, Q24H  mycophenolate, 250 mg, Oral, Q12H  NIFEdipine XL, 90 mg, Oral, Q24H  senna-docusate sodium, 2 tablet, Oral, BID  sevelamer, 800 mg, Oral, TID With Meals  sodium chloride, 500 mL, Intravenous, Once  sodium chloride, 10 mL, Intravenous, Q12H  sodium zirconium cyclosilicate, 10 g, Oral, TID       Infusions     PRN Medications   acetaminophen **OR** acetaminophen **OR** acetaminophen    senna-docusate sodium **AND** polyethylene glycol **AND** bisacodyl **AND** bisacodyl    calcium carbonate    nitroglycerin    ondansetron **OR** ondansetron    [COMPLETED] Insert Peripheral IV **AND** sodium chloride    sodium chloride    sodium chloride     Physical Findings          General Findings alert   Oral/Mouth Cavity WNL   Edema  no edema   Gastrointestinal abdominal distension, abdominal pain   Skin  skin intact   Tubes/Drains/Lines none   NFPE See Malnutrition Severity Assessment   --  Malnutrition Severity Assessment      Patient meets criteria for : Moderate (non-severe) Malnutrition  Malnutrition Type (last 8 hours)       Malnutrition Severity Assessment       Row Name 10/26/23 0859       Malnutrition Severity Assessment    Malnutrition Type Acute Disease or Injury - Related Malnutrition      Row Name 10/26/23 0858       Malnutrition Severity Assessment    Malnutrition Type Chronic Disease - Related Malnutrition      Row Name  10/26/23 0859       Insufficient Energy Intake     Insufficient Energy Intake Findings Moderate    Insufficient Energy Intake  <75% of est. energy requirement for >7d)      Row Name 10/26/23 0858       Insufficient Energy Intake     Insufficient Energy Intake Findings Moderate    Insufficient Energy Intake  <75% of est. energy requirement for >7d)      Row Name 10/26/23 0859       Unintentional Weight Loss     Unintentional Weight Loss Findings Moderate    Unintentional Weight Loss  Weight loss of 1-2% in one week      Row Name 10/26/23 0858       Unintentional Weight Loss     Unintentional Weight Loss Findings Moderate    Unintentional Weight Loss  Weight loss of 1-2% in one week      Row Name 10/26/23 0859       Criteria Met (Must meet criteria for severity in at least 2 of these categories: M Wasting, Fat Loss, Fluid, Secondary Signs, Wt. Status, Intake)    Patient meets criteria for  Moderate (non-severe) Malnutrition      Row Name 10/26/23 0858       Criteria Met (Must meet criteria for severity in at least 2 of these categories: M Wasting, Fat Loss, Fluid, Secondary Signs, Wt. Status, Intake)    Patient meets criteria for  Moderate (non-severe) Malnutrition                       Estimated/Assessed Needs        Current Weight  Weight: 52.2 kg (115 lb) (10/26/23 0012)       Energy Requirements    Weight for Calculation 52.2 kg   Method for Estimation  30-35 kcal/kg   EST Needs (kcal/day) 5314-7150       Protein Requirements    Weight for Calculation 52.2 kg   EST Protein Needs (g/kg) 1.0 gm/kg, 1.5 gm/kg   EST Daily Needs (g/day) 52-78       Fluid Requirements     Method for Estimation 1 mL/kcal    EST Needs (mL/day)      Current Nutrition Orders & Evaluation of Intake       Oral Nutrition     Food Allergies NKFA   Current PO Diet Diet: Renal Diets; Low Sodium (2-3g), Low Potassium, Low Phosphorus; Texture: Regular Texture (IDDSI 7); Fluid Consistency: Thin (IDDSI 0)   Supplement n/a   PO Evaluation     % PO  Intake 0 breakfast this am    Factors Affecting Intake: abdominal pain, decreased appetite   --  PES STATEMENT / NUTRITION DIAGNOSIS      Nutrition Dx Problem  Problem: Unintentional Weight Loss, Malnutrition (moderate), and Inadequate Oral Intake  Etiology: Factors Affecting Nutrition - decrease appetite, abd pain    Signs/Symptoms: PO intake, NFPE Results, Unintended Weight Change, and Report/Observation     NUTRITION INTERVENTION / PLAN OF CARE      Intervention Goal(s) Maintain nutrition status, Reduce/improve symptoms, Meet estimated needs, Disease management/therapy, Tolerate PO , Increase intake, Maintain weight, and PO intake goal %: 75+         RD Intervention/Action Interview for preferences, Supplement offered/declined, Encourage intake, Continue to monitor, and Care plan reviewed   --      Prescription/Orders:       PO Diet       Supplements       Enteral Nutrition       Parenteral Nutrition    New Prescription Ordered? Continue same per protocol   --      Monitor/Evaluation Per protocol, PO intake, Weight, GI status   Discharge Plan/Needs Pending clinical course   --    RD to follow per protocol.      Electronically signed by:  Carmen Adames RD  10/26/23 09:06 EDT

## 2023-10-26 NOTE — ED NOTES
Nursing report ED to floor  Dee Herrera  33 y.o.  female    HPI (triage note):   Chief Complaint   Patient presents with    Shortness of Breath       Admitting doctor:   Bobby Carter MD    Admitting diagnosis:   The primary encounter diagnosis was Shortness of breath. Diagnoses of ESRD (end stage renal disease) on dialysis, Hyponatremia, Generalized abdominal pain, Elevated lactic acid level, and Elevated troponin were also pertinent to this visit.    Code status:   Current Code Status       Date Active Code Status Order ID Comments User Context       10/26/2023 0434 CPR (Attempt to Resuscitate) [id number]  Celeste Melo APRN ED        Question Answer    Code Status (Patient has no pulse and is not breathing) CPR (Attempt to Resuscitate)    Medical Interventions (Patient has pulse or is breathing) Full Support                    Allergies:   Minoxidil    Past Medical History:  Past Medical History:   Diagnosis Date    Anasarca     PER CT SCAN    Dry skin     ESRD (end stage renal disease) on dialysis     TUES, [place], [address]    History of abdominal pain     History of anemia     History of transfusion     Hypertension     Iron deficiency anemia 09/27/2021    Lupus (systemic lupus erythematosus) 07/30/2022    Migraine     Other specified nutritional anemias     Pericardial effusion     Renal insufficiency     Seizures     STATES LAST WAS 1/2023    Shortness of breath     OCCASIONAL    Vitamin D deficiency 09/27/2021        Weight:       10/26/23  0012   Weight: 52.2 kg (115 lb)       Most recent vitals:   Vitals:    10/26/23 0330 10/26/23 0331 10/26/23 0401 10/26/23 0431   BP:  (!) 168/106 (!) 169/108 (!) 161/108   Pulse:  93 99 90   Resp:       Temp:       TempSrc:       SpO2: 100%  99% 100%   Weight:       Height:           Active LDAs/IV Access:   Lines, Drains & Airways       Active LDAs       Name Placement date Placement time Site Days    Peripheral IV 10/26/23 0055 Left  Antecubital 10/26/23  0055  Antecubital  less than 1    Peritoneal Dialysis Catheter Left lower abdomen 04/03/23  1346  Left lower abdomen  205                    Labs (abnormal labs have a star):   Labs Reviewed   COMPREHENSIVE METABOLIC PANEL - Abnormal; Notable for the following components:       Result Value    Glucose 101 (*)     BUN 47 (*)     Creatinine 9.75 (*)     Sodium 122 (*)     Chloride 82 (*)     ALT (SGPT) 71 (*)     AST (SGOT) 61 (*)     BUN/Creatinine Ratio 4.8 (*)     Anion Gap 18.0 (*)     eGFR 5.0 (*)     All other components within normal limits    Narrative:     GFR Normal >60  Chronic Kidney Disease <60  Kidney Failure <15     LACTIC ACID, PLASMA - Abnormal; Notable for the following components:    Lactate 2.2 (*)     All other components within normal limits   TROPONIN - Abnormal; Notable for the following components:    HS Troponin T 130 (*)     All other components within normal limits    Narrative:     High Sensitive Troponin T Reference Range:  <10.0 ng/L- Negative Female for AMI  <15.0 ng/L- Negative Male for AMI  >=10 - Abnormal Female indicating possible myocardial injury.  >=15 - Abnormal Male indicating possible myocardial injury.   Clinicians would have to utilize clinical acumen, EKG, Troponin, and serial changes to determine if it is an Acute Myocardial Infarction or myocardial injury due to an underlying chronic condition.        BNP (IN-HOUSE) - Abnormal; Notable for the following components:    proBNP >70,000.0 (*)     All other components within normal limits    Narrative:     This assay is used as an aid in the diagnosis of individuals suspected of having heart failure. It can be used as an aid in the diagnosis of acute decompensated heart failure (ADHF) in patients presenting with signs and symptoms of ADHF to the emergency department (ED). In addition, NT-proBNP of <300 pg/mL indicates ADHF is not likely.    Age Range Result Interpretation  NT-proBNP Concentration  (pg/mL:      <50             Positive            >450                   Gray                 300-450                    Negative             <300    50-75           Positive            >900                  Gray                300-900                  Negative            <300      >75             Positive            >1800                  Gray                300-1800                  Negative            <300   PROTIME-INR - Abnormal; Notable for the following components:    Protime 14.8 (*)     INR 1.15 (*)     All other components within normal limits   CBC WITH AUTO DIFFERENTIAL - Abnormal; Notable for the following components:    Hemoglobin 11.1 (*)     RDW 18.2 (*)     Platelets 121 (*)     Monocyte % 12.5 (*)     Eosinophil % 0.2 (*)     All other components within normal limits   HIGH SENSITIVITIY TROPONIN T 2HR - Abnormal; Notable for the following components:    HS Troponin T 123 (*)     Troponin T Delta -7 (*)     All other components within normal limits    Narrative:     High Sensitive Troponin T Reference Range:  <10.0 ng/L- Negative Female for AMI  <15.0 ng/L- Negative Male for AMI  >=10 - Abnormal Female indicating possible myocardial injury.  >=15 - Abnormal Male indicating possible myocardial injury.   Clinicians would have to utilize clinical acumen, EKG, Troponin, and serial changes to determine if it is an Acute Myocardial Infarction or myocardial injury due to an underlying chronic condition.        RESPIRATORY PANEL PCR W/ COVID-19 (SARS-COV-2), NP SWAB IN UTM/VTP, 3-4 HR TAT - Normal    Narrative:     In the setting of a positive respiratory panel with a viral infection PLUS a negative procalcitonin without other underlying concern for bacterial infection, consider observing off antibiotics or discontinuation of antibiotics and continue supportive care. If the respiratory panel is positive for atypical bacterial infection (Bordetella pertussis, Chlamydophila pneumoniae, or Mycoplasma  pneumoniae), consider antibiotic de-escalation to target atypical bacterial infection.   APTT - Normal   HCG, SERUM, QUALITATIVE - Normal   LIPASE - Normal   LACTIC ACID, REFLEX - Normal   CBC (NO DIFF)   BASIC METABOLIC PANEL   BASIC METABOLIC PANEL   TROPONIN   OSMOLALITY, URINE   CHLORIDE, URINE, RANDOM   SODIUM, URINE, RANDOM   CBC AND DIFFERENTIAL    Narrative:     The following orders were created for panel order CBC & Differential.  Procedure                               Abnormality         Status                     ---------                               -----------         ------                     CBC Auto Differential[[id number]]        Abnormal            Final result                 Please view results for these tests on the individual orders.       EKG:   ECG 12 Lead Dyspnea   Preliminary Result   HEART RATE= 97  bpm   RR Interval= 619  ms   KY Interval= 200  ms   P Horizontal Axis= -27  deg   P Front Axis= 83  deg   QRSD Interval= 105  ms   QT Interval= 385  ms   QTcB= 489  ms   QRS Axis= -72  deg   T Wave Axis= 71  deg   - ABNORMAL ECG -   Sinus rhythm   Borderline prolonged KY interval   Biatrial enlargement   Incomplete RBBB and LAFB   Left ventricular hypertrophy   Anterior infarct, acute (LAD)   ST elevation, consider inferior injury   Electronically Signed By:    Date and Time of Study: 2023-10-26 00:54:39          Meds given in ED:   Medications   sodium chloride 0.9 % flush 10 mL (has no administration in time range)   sodium chloride 0.9 % flush 10 mL (has no administration in time range)   sodium chloride 0.9 % flush 10 mL (has no administration in time range)   sodium chloride 0.9 % infusion 40 mL (has no administration in time range)   sennosides-docusate (PERICOLACE) 8.6-50 MG per tablet 2 tablet (has no administration in time range)     And   polyethylene glycol (MIRALAX) packet 17 g (has no administration in time range)     And   bisacodyl (DULCOLAX) EC tablet 5 mg (has no  administration in time range)     And   bisacodyl (DULCOLAX) suppository 10 mg (has no administration in time range)   nitroglycerin (NITROSTAT) SL tablet 0.4 mg (has no administration in time range)   acetaminophen (TYLENOL) tablet 650 mg (has no administration in time range)     Or   acetaminophen (TYLENOL) 160 MG/5ML oral solution 650 mg (has no administration in time range)     Or   acetaminophen (TYLENOL) suppository 650 mg (has no administration in time range)   ondansetron (ZOFRAN) tablet 4 mg (has no administration in time range)     Or   ondansetron (ZOFRAN) injection 4 mg (has no administration in time range)   calcium carbonate (TUMS) chewable tablet 500 mg (200 mg elemental) (has no administration in time range)   ondansetron (ZOFRAN) injection 4 mg (4 mg Intravenous Given 10/26/23 0154)   morphine injection 4 mg (4 mg Intravenous Given 10/26/23 0154)   morphine injection 2 mg (2 mg Intravenous Given 10/26/23 0405)       Imaging results:  XR Chest 1 View    Result Date: 10/26/2023  Electronically signed by Amari Romero MD on 10-26-23 at 0209     Ambulatory status:   - assist x1    Social issues:   Social History     Socioeconomic History    Marital status: Single   Tobacco Use    Smoking status: Former     Types: Cigars     Passive exposure: Past    Smokeless tobacco: Never    Tobacco comments:     Patient smoked black & mild   Vaping Use    Vaping Use: Never used   Substance and Sexual Activity    Alcohol use: Yes     Comment: social    Drug use: Yes     Types: Marijuana     Comment: OCCASIONAL    Sexual activity: Not Currently     Partners: Male     Birth control/protection: None          NIH Stroke Scale:         Erasto Greenwood RN  10/26/23 05:05 EDT

## 2023-10-26 NOTE — CASE MANAGEMENT/SOCIAL WORK
Discharge Planning Assessment  [facility]     Patient Name: Dee Herrera  MRN: [id number]  Today's Date: 10/26/2023    Admit Date: 10/26/2023    Plan: Home with her mother. Continue PD with [facility] per nephrology's orders. Holding PD exchange x 24 hrs due to sodium 122. Denies any needs. Family to transport.   Discharge Needs Assessment       Row Name 10/26/23 1345       Living Environment    People in Home parent(s)    Name(s) of People in Home Pt states she lives with her mom.    Current Living Arrangements apartment    Potentially Unsafe Housing Conditions none    Primary Care Provided by self    Provides Primary Care For no one    Family Caregiver if Needed parent(s)    Quality of Family Relationships involved    Able to Return to Prior Arrangements yes       Resource/Environmental Concerns    Resource/Environmental Concerns home accessibility    Home Accessibility Concerns stairs to enter home    Transportation Concerns none       Transition Planning    Patient/Family Anticipates Transition to home with family    Patient/Family Anticipated Services at Transition none    Transportation Anticipated family or friend will provide       Discharge Needs Assessment    Readmission Within the Last 30 Days no previous admission in last 30 days    Current Outpatient/Agency/Support Group outpatient peritoneal dialysis    Equipment Currently Used at Home other (see comments)  PD supplies    Concerns to be Addressed discharge planning    Anticipated Changes Related to Illness none    Equipment Needed After Discharge none    Provided Post Acute Provider List? N/A    N/A Provider List Comment Denies any needs or services    Provided Post Acute Provider Quality & Resource List? N/A    Current Discharge Risk chronically ill                   Discharge Plan       Row Name 10/26/23 1400       Plan    Plan Home with her mother. Continue PD with [facility] per nephrology's orders. Holding PD exchange x 24 hrs due to sodium  122. Denies any needs. Family to transport.    Plan Comments Met with pt. at bedside. Explained roll of . Face sheet and pharmacy verified. Pt lives with her mom in a 2nd floor apartment.  Home DME includes PD supplies.  Pt is independent with ADLs. Pt has never been to Rehab or used HH. Pt's PCP is Margarita MAYNARD. Pt enrolled with Meds to Bed. At discharge, family will transport. Pt denies any discharge needs. Explained that CCP would follow to assess for discharge needs.  Ian Holley RN-BC                  Continued Care and Services - Admitted Since 10/26/2023    Coordination has not been started for this encounter.       Expected Discharge Date and Time       Expected Discharge Date Expected Discharge Time    Oct 29, 2023            Demographic Summary       Row Name 10/26/23 1342       General Information    Admission Type inpatient    Arrived From emergency department    Required Notices Provided Important Message from Medicare    Reason for Consult discharge planning    Preferred Language English                   Functional Status       Row Name 10/26/23 1345       Functional Status, IADL    Medications independent    Meal Preparation independent    Housekeeping independent    Laundry independent    Shopping independent       Mental Status    General Appearance WDL WDL       Mental Status Summary    Recent Changes in Mental Status/Cognitive Functioning no changes                          Ian Holley RN

## 2023-10-26 NOTE — CASE MANAGEMENT/SOCIAL WORK
Continued Stay Note  [facility]     Patient Name: Dee Herrera  MRN: [id number]  Today's Date: 10/26/2023    Admit Date: 10/26/2023    Plan: Home with her mother. Continue PD with [facility] per nephrology's orders. Holding PD exchange x 24 hrs due to sodium 122. Denies any needs. Family to transport.   Discharge Plan       Row Name 10/26/23 1400       Plan    Plan Home with her mother. Continue PD with [facility] per nephrology's orders. Holding PD exchange x 24 hrs due to sodium 122. Denies any needs. Family to transport.    Plan Comments Met with pt. at bedside. Explained roll of . Face sheet and pharmacy verified. Pt lives with her mom in a 2nd floor apartment.  Home DME includes PD supplies.  Pt is independent with ADLs. Pt has never been to Rehab or used HH. Pt's PCP is Margarita MAYNARD. Pt enrolled with Meds to Bed. At discharge, family will transport. Pt denies any discharge needs. Explained that CCP would follow to assess for discharge needs.  Ian Holley RN-BC                   Discharge Codes    No documentation.                 Expected Discharge Date and Time       Expected Discharge Date Expected Discharge Time    Oct 29, 2023               Ian Holley RN

## 2023-10-26 NOTE — PLAN OF CARE
Goal Outcome Evaluation:           Progress: no change  Outcome Evaluation: BP elevatein am. Home BP meds reordered. BP down to 120's systolic this afternoon. Afternoon Na dropped to 120. Perotineal dialysis ordered this afternoon. 500ml NS bolus given. Will continue to monitor.

## 2023-10-26 NOTE — CONSULTS
Date of Consultation: 10/26/23    Referral Provider: Dr. Lezama    Reason for Consultation: Shortness of breath, history of pericardial effusion and valvular heart disease.    Encounter Provider: Bernardo Correa MD    Group of Service: [facility]     Patient Name: Dee Herrera    :1990    Chief complaint: Shortness of breath.    History of Present Illness:     This is a very pleasant 33 year-old female who follows with Dr. Taylor in our group.  She has a history of end-stage renal disease (on peritoneal dialysis) secondary to lupus nephritis, systemic lupus, valvular heart disease, LVH, and multiple other medical issues.  She also has a history of a pericardial effusion in the past.    She has undergone a left heart and right heart catheterization on 2023 by Dr. Taylor which showed normal coronary arteries and normal right-sided pressures with a mean PA pressure of 16.  She has a history of severe left ventricular hypertrophy, likely secondary to her renal disease and hypertension.  Her last echocardiogram was from [facility] on 2023 which showed an ejection fraction of 40 to 45%, moderate to severe mitral regurgitation, moderate to severe aortic insufficiency, and mild to moderate tricuspid regurgitation (per report).  She did also have a PYP scan on 2023 which was normal.    The patient presented to the emergency department on 10/26/2023 with shortness of breath with exertion which had worsened over the previous several days.  This did not improve with her inhalers, and she sought medical treatment.  Her sodium was also found to be 122, and her proBNP was greater than 70,000.  There did not appear to be significant pulmonary edema on the chest x-ray.      ECHO 23    Left ventricular systolic function is normal. Calculated left ventricular EF = 59.1%    Left ventricular wall thickness is consistent with severe concentric hypertrophy.    Differential diagnosis includes  infiltrative cardiomyopathy, long-standing HTN and/or chronic renal insufficiency. Apical sparing on strain imaging is present, consistent with amyloid heart disease, suggest clinical correlation    Left ventricular diastolic function is consistent with (grade II w/high LAP) pseudonormalization.    There is moderate, bileaflet mitral valve thickening present.    There is a moderate-large circumferential pericardial effusion; there is somewhat greater effusion posteriorly. There is no evidence of cardiac tamponade.    Elevated left atrial pressure.    Cardiac Catheterization 6/14/23  CONCLUSION:  1.  Normal coronary arteries  2.  Normal pulmonary pressures    Past Medical History:   Diagnosis Date    Anasarca     PER CT SCAN    Dry skin     ESRD (end stage renal disease) on dialysis     TUMARIA ISABEL, THZACK, SAT [address]HAI[address]    History of abdominal pain     History of anemia     History of transfusion     Hypertension     Iron deficiency anemia 09/27/2021    Lupus (systemic lupus erythematosus) 07/30/2022    Migraine     Other specified nutritional anemias     Pericardial effusion     Renal insufficiency     Seizures     STATES LAST WAS 1/2023    Shortness of breath     OCCASIONAL    Vitamin D deficiency 09/27/2021         Past Surgical History:   Procedure Laterality Date    CARDIAC CATHETERIZATION N/A 06/14/2023    Procedure: Coronary angiography;  Surgeon: Juana Taylor MD;  Location:  [facility];  Service: Cardiovascular;  Laterality: N/A;    CARDIAC CATHETERIZATION N/A 06/14/2023    Procedure: Left heart cath;  Surgeon: Juana Taylor MD;  Location:  [facility];  Service: Cardiovascular;  Laterality: N/A;    CARDIAC CATHETERIZATION N/A 06/14/2023    Procedure: Right Heart Cath;  Surgeon: Juana Taylor MD;  Location:  [facility];  Service: Cardiovascular;  Laterality: N/A;    COLONOSCOPY N/A 7/20/2023    Procedure: COLONOSCOPY to cecum with biopsy;   Surgeon: Drew Kaminski MD;  Location: [facility] ENDOSCOPY;  Service: Gastroenterology;  Laterality: N/A;  PRE - diarrhea, constipation  POST - fair prep, normal    ENDOSCOPY N/A 7/20/2023    Procedure: ESOPHAGOGASTRODUODENOSCOPY with biopsy;  Surgeon: Drew Kaminski MD;  Location: [facility] ENDOSCOPY;  Service: Gastroenterology;  Laterality: N/A;  PRE - abn ct abd  POST - gastritis    INSERTION HEMODIALYSIS CATHETER N/A 07/26/2022    Procedure: RIGHT TUNNELED DIALYSIS CATHETER PLACEMENT;  Surgeon: Diandra Adhikari MD;  Location: [facility] MAIN OR;  Service: Vascular;  Laterality: N/A;    INSERTION PERITONEAL DIALYSIS CATHETER N/A 04/03/2023    Procedure: INSERTION PERITONEAL DIALYSIS CATHETER LAPAROSCOPIC, omentumpexy;  Surgeon: Jemal Loyola MD;  Location: [facility] MAIN OR;  Service: General;  Laterality: N/A;    TONSILLECTOMY           Allergies   Allergen Reactions    Minoxidil Other (See Comments) and Hives     Pericardial effusion .         No current facility-administered medications on file prior to encounter.     Current Outpatient Medications on File Prior to Encounter   Medication Sig Dispense Refill    carvedilol (COREG) 25 MG tablet Take 1 tablet by mouth Every 12 (Twelve) Hours. 60 tablet 0    famotidine (PEPCID) 20 MG tablet Take 1 tablet by mouth Daily. 30 tablet 0    hydroxychloroquine (PLAQUENIL) 200 MG tablet Take 1 tablet by mouth Daily.      mycophenolate (CELLCEPT) 500 MG tablet Take 0.5 tablets by mouth Every 12 (Twelve) Hours. 30 tablet 0    ondansetron (Zofran) 4 MG tablet Take 1 tablet by mouth Every 8 (Eight) Hours As Needed for Nausea or Vomiting. 30 tablet 1    predniSONE (DELTASONE) 10 MG tablet Take 1 tablet by mouth Daily. 30 tablet 0    sevelamer (RENVELA) 800 MG tablet Take 1 tablet by mouth 3 (Three) Times a Day With Meals.      NIFEdipine XL (PROCARDIA XL) 90 MG 24 hr tablet Take 1 tablet by mouth Daily. (Patient not taking: Reported on 10/26/2023) 30 tablet 0    polyethylene  "glycol (MIRALAX) 17 GM/SCOOP powder Take 17 g by mouth As Needed. (Patient not taking: Reported on 10/26/2023)      potassium chloride 10 MEQ CR tablet Take 1 tablet by mouth Daily.      sennosides-docusate (PERICOLACE) 8.6-50 MG per tablet Take 2 tablets by mouth Daily As Needed for Constipation. (Patient not taking: Reported on 10/26/2023) 30 tablet 1    simethicone (MYLICON) 80 MG chewable tablet Chew 1 tablet Every 6 (Six) Hours As Needed. (Patient not taking: Reported on 10/26/2023)           Social History     Socioeconomic History    Marital status: Single   Tobacco Use    Smoking status: Former     Types: Cigars     Passive exposure: Past    Smokeless tobacco: Never    Tobacco comments:     Patient smoked black & mild   Vaping Use    Vaping Use: Never used   Substance and Sexual Activity    Alcohol use: Yes     Comment: social    Drug use: Yes     Types: Marijuana     Comment: OCCASIONAL    Sexual activity: Not Currently     Partners: Male     Birth control/protection: None         Family History   Problem Relation Age of Onset    Autoimmune disease Mother     Anemia Mother     Diabetes Sister     Anemia Brother     Diabetes Maternal Grandmother     Hypertension Maternal Grandmother     Cancer Maternal Grandmother     Sickle cell anemia Cousin     Malig Hyperthermia Neg Hx        REVIEW OF SYSTEMS:   Pertinent positives are noted in the HPI above.  Otherwise, all other systems were reviewed, and are negative.     Objective:     Vitals:    10/26/23 1349 10/26/23 1536 10/26/23 1924 10/26/23 1936   BP: 134/86 132/78 112/82 112/82   BP Location: Right arm Right arm Right arm    Patient Position: Lying Sitting Lying    Pulse: 77 68 63    Resp: 18 16 16    Temp: 97.5 °F (36.4 °C) 97.9 °F (36.6 °C) 97.5 °F (36.4 °C)    TempSrc: Oral Oral Oral    SpO2: 100% 100% 100%    Weight:       Height:         Body mass index is 19.14 kg/m².  Flowsheet Rows      Flowsheet Row First Filed Value   Admission Height 165.1 cm (65\") " Documented at 10/26/2023 0012   Admission Weight 52.2 kg (115 lb) Documented at 10/26/2023 0012             General:    No acute distress, alert and oriented x4, pleasant, chronically ill-appearing.                   Head:    Normocephalic, atraumatic.   Eyes:          Conjunctivae and sclerae normal, no icterus.   Throat:   No oral lesions, no thrush, oral mucosa moist.    Neck:   Supple, trachea midline.   Lungs:     Clear to auscultation bilaterally     Heart:    Regular rhythm and normal rate.  III/VI SM throughout.   Abdomen:     Soft, non-tender, non-distended, PD catheter noted.   Extremities:   No clubbing, cyanosis, or edema.     Pulses:   Pulses palpable and equal bilaterally.    Skin:   No bleeding or rash.   Neuro:   Non-focal.  Moves all extremities well.    Psychiatric:   Normal mood and affect.     Lab Review:                Results from last 7 days   Lab Units 10/26/23  1256   SODIUM mmol/L 120*   POTASSIUM mmol/L 4.4   CHLORIDE mmol/L 81*   CO2 mmol/L 20.0*   BUN mg/dL 53*   CREATININE mg/dL 10.50*   GLUCOSE mg/dL 106*   CALCIUM mg/dL 9.4     Results from last 7 days   Lab Units 10/26/23  0649 10/26/23  0314 10/26/23  0054   HSTROP T ng/L 117* 123* 130*     Results from last 7 days   Lab Units 10/26/23  0649   WBC 10*3/mm3 3.93   HEMOGLOBIN g/dL 10.9*   HEMATOCRIT % 34.2   PLATELETS 10*3/mm3 104*     Results from last 7 days   Lab Units 10/26/23  0054   INR  1.15*   APTT seconds 29.0                   EKG (reviewed by me personally):                Assessment:   1.  Shortness of breath, likely multifactorial  2.  End-stage renal disease secondary to lupus nephritis, on peritoneal dialysis  3.  Hypovolemic acute hyponatremia  4.  Systemic lupus erythematosus  5.  History of pericardial effusion  6.  History of severe LVH  7.  Valvular heart disease with moderate to severe mitral regurgitation and moderate to severe aortic insufficiency by echo at [facility] on 9/11/2023  8.  EF 40 to 45% at [facility] on  9/11/2023 (previously normal here)  9.  Angiographically normal coronary arteries by cath in June 2023  10.  Elevated high-sensitivity troponin secondary to renal disease  11.  Severe hypertension   12.  Anemia of chronic disease  13.  Seizure disorder  14.  Chronic abdominal pain    Plan:       There is some discrepancy between her most recent echocardiogram at the [facility] on 9/11/2023, and her echocardiogram from earlier this year here on 2/14/2023.  Specifically, there was a pericardial effusion earlier this year which was not said to be present now.  There was also significant valvular heart disease with moderate to severe mitral regurgitation and moderate to severe aortic insufficiency on the echocardiogram from [facility].  Additionally, the ejection fraction was normal in February 2023, and was 40 to 45% at [facility] on 9/11/2023    An echocardiogram has been ordered here as this is obviously very important to sort out.  She likely has multifactorial dyspnea, although if her valvular heart disease is worsening, this could certainly be playing a role.  She has a very prominent murmur throughout her precordium on exam, and I suspect she may have severe mitral regurgitation.  She did not have significant pulmonary edema or volume overload on her chest x-ray.    Her volume management is going to need to be through peritoneal dialysis.  It should be noted that she had normal right-sided pressures and no evidence of pulmonary hypertension on her right heart catheterization in June 2023.  Also, she had angiographically normal coronary arteries at that time.    Ultimately, the echocardiogram should be able to provide clarity on her ejection fraction, valvular heart disease, and any potential pericardial effusion.  This in turn will help delineate potential cardiac causes of her dyspnea.  Dr. Taylor will see her tomorrow.    Thank you very much for this consult.    Homer Correa MD

## 2023-10-26 NOTE — PLAN OF CARE
Goal Outcome Evaluation:         Patient arrived to the unit via stretcher at 1530. Patient alert and oriented. Patient vomiting- zofran was administered at 1120. Nurse notified Dr Lezama he ordered compazine which improved nausea and vomiting which allowed the patient to rest with eyes closed for a while. Peritoneal dialysis ordered for patient but when preparing the exchange nurse realized the dialysate was not in the correct bag-[place]. Nurse called nephrologist and after 2 phone calls -the right dialysate made it to the unit. Patient with flat affect and soft voice. Mother called for update on patient- patient gave permission for this nurse to speak with mom. No acute distress noted at this time, will continue to monitor.

## 2023-10-27 ENCOUNTER — APPOINTMENT (OUTPATIENT)
Dept: CARDIOLOGY | Facility: HOSPITAL | Age: 33
DRG: 219 | End: 2023-10-27
Payer: MEDICARE

## 2023-10-27 LAB
ALBUMIN SERPL-MCNC: 2.9 G/DL (ref 3.5–5.2)
ANION GAP SERPL CALCULATED.3IONS-SCNC: 18.1 MMOL/L (ref 5–15)
AORTIC ARCH: 2.7 CM
ASCENDING AORTA: 3.8 CM
BH CV ECHO LEFT VENTRICLE GLOBAL LONGITUDINAL STRAIN: -12 %
BH CV ECHO MEAS - ACS: 2.19 CM
BH CV ECHO MEAS - AI P1/2T: 626 MSEC
BH CV ECHO MEAS - AO MAX PG: 7.8 MMHG
BH CV ECHO MEAS - AO MEAN PG: 4.7 MMHG
BH CV ECHO MEAS - AO ROOT DIAM: 3.2 CM
BH CV ECHO MEAS - AO V2 MAX: 140.1 CM/SEC
BH CV ECHO MEAS - AO V2 VTI: 26.3 CM
BH CV ECHO MEAS - AVA(I,D): 2.44 CM2
BH CV ECHO MEAS - EDV(CUBED): 144.2 ML
BH CV ECHO MEAS - EDV(MOD-SP2): 154 ML
BH CV ECHO MEAS - EDV(MOD-SP4): 121 ML
BH CV ECHO MEAS - EF(MOD-SP2): 49.4 %
BH CV ECHO MEAS - EF(MOD-SP4): 42.1 %
BH CV ECHO MEAS - ESV(CUBED): 76.4 ML
BH CV ECHO MEAS - ESV(MOD-SP2): 78 ML
BH CV ECHO MEAS - ESV(MOD-SP4): 70 ML
BH CV ECHO MEAS - FS: 19.1 %
BH CV ECHO MEAS - IVS/LVPW: 0.98 CM
BH CV ECHO MEAS - IVSD: 1.58 CM
BH CV ECHO MEAS - LAT PEAK E' VEL: 7.9 CM/SEC
BH CV ECHO MEAS - LV DIASTOLIC VOL/BSA (35-75): 77.4 CM2
BH CV ECHO MEAS - LV MASS(C)D: 381 GRAMS
BH CV ECHO MEAS - LV MAX PG: 3.8 MMHG
BH CV ECHO MEAS - LV MEAN PG: 2.14 MMHG
BH CV ECHO MEAS - LV SYSTOLIC VOL/BSA (12-30): 44.8 CM2
BH CV ECHO MEAS - LV V1 MAX: 96.8 CM/SEC
BH CV ECHO MEAS - LV V1 VTI: 19.9 CM
BH CV ECHO MEAS - LVIDD: 5.2 CM
BH CV ECHO MEAS - LVIDS: 4.2 CM
BH CV ECHO MEAS - LVOT AREA: 3.2 CM2
BH CV ECHO MEAS - LVOT DIAM: 2.03 CM
BH CV ECHO MEAS - LVPWD: 1.61 CM
BH CV ECHO MEAS - MED PEAK E' VEL: 8.3 CM/SEC
BH CV ECHO MEAS - MV DEC SLOPE: 558 CM/SEC2
BH CV ECHO MEAS - MV DEC TIME: 0.14 SEC
BH CV ECHO MEAS - MV E MAX VEL: 78.1 CM/SEC
BH CV ECHO MEAS - PA ACC TIME: 0.15 SEC
BH CV ECHO MEAS - PA V2 MAX: 59.9 CM/SEC
BH CV ECHO MEAS - RAP SYSTOLE: 8 MMHG
BH CV ECHO MEAS - RV MAX PG: 0.86 MMHG
BH CV ECHO MEAS - RV V1 MAX: 46.3 CM/SEC
BH CV ECHO MEAS - RV V1 VTI: 8.8 CM
BH CV ECHO MEAS - RVSP: 45 MMHG
BH CV ECHO MEAS - SI(MOD-SP2): 48.6 ML/M2
BH CV ECHO MEAS - SI(MOD-SP4): 32.6 ML/M2
BH CV ECHO MEAS - SV(LVOT): 64.1 ML
BH CV ECHO MEAS - SV(MOD-SP2): 76 ML
BH CV ECHO MEAS - SV(MOD-SP4): 51 ML
BH CV ECHO MEAS - TAPSE (>1.6): 0.97 CM
BH CV ECHO MEAS - TR MAX PG: 37 MMHG
BH CV ECHO MEAS - TR MAX VEL: 304.2 CM/SEC
BH CV ECHO MEASUREMENTS AVERAGE E/E' RATIO: 9.64
BH CV XLRA - RV BASE: 2.8 CM
BH CV XLRA - RV LENGTH: 7.5 CM
BH CV XLRA - RV MID: 2.43 CM
BH CV XLRA - TDI S': 6.9 CM/SEC
BUN SERPL-MCNC: 50 MG/DL (ref 6–20)
BUN/CREAT SERPL: 5 (ref 7–25)
CALCIUM SPEC-SCNC: 8.7 MG/DL (ref 8.6–10.5)
CHLORIDE SERPL-SCNC: 83 MMOL/L (ref 98–107)
CO2 SERPL-SCNC: 19.9 MMOL/L (ref 22–29)
CREAT SERPL-MCNC: 10.02 MG/DL (ref 0.57–1)
DEPRECATED RDW RBC AUTO: 52.3 FL (ref 37–54)
EGFRCR SERPLBLD CKD-EPI 2021: 4.9 ML/MIN/1.73
ERYTHROCYTE [DISTWIDTH] IN BLOOD BY AUTOMATED COUNT: 18 % (ref 12.3–15.4)
GLUCOSE SERPL-MCNC: 134 MG/DL (ref 65–99)
HCT VFR BLD AUTO: 32.8 % (ref 34–46.6)
HGB BLD-MCNC: 10.6 G/DL (ref 12–15.9)
LEFT ATRIUM VOLUME INDEX: 48.6 ML/M2
MCH RBC QN AUTO: 26.7 PG (ref 26.6–33)
MCHC RBC AUTO-ENTMCNC: 32.3 G/DL (ref 31.5–35.7)
MCV RBC AUTO: 82.6 FL (ref 79–97)
OSMOLALITY SERPL: 270 MOSM/KG (ref 275–300)
PHOSPHATE SERPL-MCNC: 6.6 MG/DL (ref 2.5–4.5)
PLATELET # BLD AUTO: 78 10*3/MM3 (ref 140–450)
POTASSIUM SERPL-SCNC: 3.7 MMOL/L (ref 3.5–5.2)
RBC # BLD AUTO: 3.97 10*6/MM3 (ref 3.77–5.28)
SINUS: 3.5 CM
SODIUM SERPL-SCNC: 121 MMOL/L (ref 136–145)
STJ: 3.6 CM
WBC NRBC COR # BLD: 3.48 10*3/MM3 (ref 3.4–10.8)

## 2023-10-27 PROCEDURE — 93306 TTE W/DOPPLER COMPLETE: CPT | Performed by: INTERNAL MEDICINE

## 2023-10-27 PROCEDURE — 99232 SBSQ HOSP IP/OBS MODERATE 35: CPT | Performed by: INTERNAL MEDICINE

## 2023-10-27 PROCEDURE — 63710000001 MYCOPHENOLATE MOFETIL PER 250 MG: Performed by: INTERNAL MEDICINE

## 2023-10-27 PROCEDURE — 80069 RENAL FUNCTION PANEL: CPT | Performed by: INTERNAL MEDICINE

## 2023-10-27 PROCEDURE — 93306 TTE W/DOPPLER COMPLETE: CPT

## 2023-10-27 PROCEDURE — 93356 MYOCRD STRAIN IMG SPCKL TRCK: CPT | Performed by: INTERNAL MEDICINE

## 2023-10-27 PROCEDURE — 85027 COMPLETE CBC AUTOMATED: CPT | Performed by: INTERNAL MEDICINE

## 2023-10-27 PROCEDURE — 93356 MYOCRD STRAIN IMG SPCKL TRCK: CPT

## 2023-10-27 PROCEDURE — 87040 BLOOD CULTURE FOR BACTERIA: CPT | Performed by: INTERNAL MEDICINE

## 2023-10-27 PROCEDURE — 83930 ASSAY OF BLOOD OSMOLALITY: CPT | Performed by: INTERNAL MEDICINE

## 2023-10-27 PROCEDURE — 25810000003: Performed by: INTERNAL MEDICINE

## 2023-10-27 RX ORDER — SEVELAMER CARBONATE 800 MG/1
1600 TABLET, FILM COATED ORAL
Status: DISCONTINUED | OUTPATIENT
Start: 2023-10-27 | End: 2023-11-02

## 2023-10-27 RX ORDER — NIFEDIPINE 60 MG/1
60 TABLET, EXTENDED RELEASE ORAL
Status: DISCONTINUED | OUTPATIENT
Start: 2023-10-27 | End: 2023-10-27

## 2023-10-27 RX ORDER — SODIUM CHLORIDE 1 G/1
1 TABLET ORAL EVERY 6 HOURS
Status: DISCONTINUED | OUTPATIENT
Start: 2023-10-28 | End: 2023-10-28

## 2023-10-27 RX ORDER — SODIUM CHLORIDE 1 G/1
1 TABLET ORAL EVERY 6 HOURS
Status: DISCONTINUED | OUTPATIENT
Start: 2023-10-27 | End: 2023-10-27

## 2023-10-27 RX ADMIN — SODIUM CHLORIDE, SODIUM LACTATE, CALCIUM CHLORIDE, MAGNESIUM CHLORIDE AND DEXTROSE 1500 ML: 2.5; 538; 448; 18.3; 5.08 INJECTION, SOLUTION INTRAPERITONEAL at 15:00

## 2023-10-27 RX ADMIN — SEVELAMER CARBONATE 1600 MG: 800 TABLET, FILM COATED ORAL at 18:11

## 2023-10-27 RX ADMIN — DEXTROSE MONOHYDRATE, SODIUM CHLORIDE, SODIUM LACTATE, CALCIUM CHLORIDE, MAGNESIUM CHLORIDE 1500 ML: 2.5; 538; 448; 18.4; 5.08 SOLUTION INTRAPERITONEAL at 08:26

## 2023-10-27 RX ADMIN — HYDROXYCHLOROQUINE SULFATE 200 MG: 200 TABLET ORAL at 08:28

## 2023-10-27 RX ADMIN — Medication 10 ML: at 08:28

## 2023-10-27 RX ADMIN — WATER: 1 INJECTION INTRAMUSCULAR; INTRAVENOUS; SUBCUTANEOUS at 21:51

## 2023-10-27 RX ADMIN — SODIUM CHLORIDE, SODIUM LACTATE, CALCIUM CHLORIDE, MAGNESIUM CHLORIDE AND DEXTROSE 1500 ML: 2.5; 538; 448; 18.3; 5.08 INJECTION, SOLUTION INTRAPERITONEAL at 11:53

## 2023-10-27 RX ADMIN — SEVELAMER CARBONATE 800 MG: 800 TABLET, FILM COATED ORAL at 08:28

## 2023-10-27 RX ADMIN — Medication 10 ML: at 21:51

## 2023-10-27 RX ADMIN — DEXTROSE MONOHYDRATE, SODIUM CHLORIDE, SODIUM LACTATE, CALCIUM CHLORIDE, MAGNESIUM CHLORIDE 1500 ML: 2.5; 538; 448; 18.4; 5.08 SOLUTION INTRAPERITONEAL at 21:50

## 2023-10-27 RX ADMIN — SODIUM CHLORIDE, SODIUM LACTATE, CALCIUM CHLORIDE, MAGNESIUM CHLORIDE AND DEXTROSE 1500 ML: 2.5; 538; 448; 18.3; 5.08 INJECTION, SOLUTION INTRAPERITONEAL at 08:26

## 2023-10-27 RX ADMIN — SODIUM CHLORIDE TAB 1 GM 1 G: 1 TAB at 12:25

## 2023-10-27 RX ADMIN — DOCUSATE SODIUM 100 MG: 100 CAPSULE, LIQUID FILLED ORAL at 08:28

## 2023-10-27 RX ADMIN — DEXTROSE MONOHYDRATE, SODIUM CHLORIDE, SODIUM LACTATE, CALCIUM CHLORIDE, MAGNESIUM CHLORIDE 1500 ML: 2.5; 538; 448; 18.4; 5.08 SOLUTION INTRAPERITONEAL at 15:00

## 2023-10-27 RX ADMIN — DOCUSATE SODIUM 50MG AND SENNOSIDES 8.6MG 2 TABLET: 8.6; 5 TABLET, FILM COATED ORAL at 08:28

## 2023-10-27 RX ADMIN — Medication 15 G: at 13:45

## 2023-10-27 RX ADMIN — SEVELAMER CARBONATE 1600 MG: 800 TABLET, FILM COATED ORAL at 12:25

## 2023-10-27 RX ADMIN — MYCOPHENOLATE MOFETIL 250 MG: 500 TABLET ORAL at 12:25

## 2023-10-27 RX ADMIN — DEXTROSE MONOHYDRATE, SODIUM CHLORIDE, SODIUM LACTATE, CALCIUM CHLORIDE, MAGNESIUM CHLORIDE 1500 ML: 2.5; 538; 448; 18.4; 5.08 SOLUTION INTRAPERITONEAL at 12:20

## 2023-10-27 RX ADMIN — DEXTROSE MONOHYDRATE, SODIUM CHLORIDE, SODIUM LACTATE, CALCIUM CHLORIDE, MAGNESIUM CHLORIDE 1500 ML: 2.5; 538; 448; 18.4; 5.08 SOLUTION INTRAPERITONEAL at 18:06

## 2023-10-27 NOTE — PROGRESS NOTES
[facility] Progress Note      Patient Name: Dee Herrera  : 1990  MRN: [id number]  Primary Care Physician:  Margarita Woods APRN  Date of admission: 10/26/2023    Subjective     Interval History:   Patient lying in bed comfortable  Having CAPD without any complications no abdominal pain , currently on 4 exchanges a day having between 100 to 200 mL of ultrafiltration per exchange  Continues to have decreased appetite    Review of Systems:   As noted above    Objective     Vitals:   Temp:  [97.5 °F (36.4 °C)-97.9 °F (36.6 °C)] 97.9 °F (36.6 °C)  Heart Rate:  [50-82] 53  Resp:  [16-18] 16  BP: ()/(59-86) 100/62    Intake/Output Summary (Last 24 hours) at 10/27/2023 1152  Last data filed at 10/27/2023 0906  Gross per 24 hour   Intake 19721 ml   Output 28856 ml   Net 3700 ml       Physical Exam:    General Appearance: alert, oriented x 3, no acute distress   Skin: warm and dry  HEENT: oral mucosa normal, nonicteric sclera  Neck: supple, no JVD  Lungs: CTA  Heart: RRR, normal S1 and S2  Abdomen: soft, nontender, nondistended  : no palpable bladder PD catheter in place  Extremities: no edema, cyanosis or clubbing  Neuro: normal speech and mental status     Scheduled Meds:     carvedilol, 25 mg, Oral, BID With Meals  Delflex-LC/2.5% Dextrose, 1.5 L, Intraperitoneal, 8 Exchanges Daily (Q3H)  docusate sodium, 100 mg, Oral, BID  hydroxychloroquine, 200 mg, Oral, Q24H  mycophenolate, 250 mg, Oral, Q12H  senna-docusate sodium, 2 tablet, Oral, BID  sevelamer, 1,600 mg, Oral, TID With Meals  sodium chloride, 10 mL, Intravenous, Q12H  sodium chloride, 1 g, Oral, Q6H  torsemide, 200 mg, Oral, Daily  UltraBag/Dianeal/2.5% Dextrose low-elen (lucia #[id number]), 1,500 mL, Intraperitoneal, 8 Exchanges Daily (Q3H)      IV Meds:        Results Reviewed:   I have personally reviewed the results from the time of this admission to 10/27/2023 11:52 EDT     Results from last 7 days   Lab Units  10/27/23  0650 10/26/23  1256 10/26/23  0649 10/26/23  0314 10/26/23  0054   SODIUM mmol/L 121* 120* 122*   < > 122*   POTASSIUM mmol/L 3.7 4.4 4.0   < > 3.8   CHLORIDE mmol/L 83* 81* 82*   < > 82*   CO2 mmol/L 19.9* 20.0* 19.0*   < > 22.0   BUN mg/dL 50* 53* 50*   < > 47*   CREATININE mg/dL 10.02* 10.50* 9.72*   < > 9.75*   CALCIUM mg/dL 8.7 9.4 9.6   < > 9.9   BILIRUBIN mg/dL  --   --   --   --  0.8   ALK PHOS U/L  --   --   --   --  116   ALT (SGPT) U/L  --   --   --   --  71*   AST (SGOT) U/L  --   --   --   --  61*   GLUCOSE mg/dL 134* 106* 104*   < > 101*    < > = values in this interval not displayed.       Estimated Creatinine Clearance: 6.7 mL/min (A) (by C-G formula based on SCr of 10.02 mg/dL (H)).    Results from last 7 days   Lab Units 10/27/23  0650   PHOSPHORUS mg/dL 6.6*             Results from last 7 days   Lab Units 10/27/23  0650 10/26/23  0649 10/26/23  0054   WBC 10*3/mm3 3.48 3.93 4.49   HEMOGLOBIN g/dL 10.6* 10.9* 11.1*   PLATELETS 10*3/mm3 78* 104* 121*       Results from last 7 days   Lab Units 10/26/23  0054   INR  1.15*       Assessment / Plan     ASSESSMENT:    -End-stage renal disease on nocturnal peritoneal dialysis. ( Dianeal solution 2.5 %  8 liters , 4 exchanges of 2 liter  + 0.5 liter day exchange ) .  No acute indication for PD we will hold treatment in the next 24 hours.  Ordered 8 exchanges 1.5 L of dianal solution       -Hypotonic hyponatremia.  Uric acid 3.9,  Serum osmo 270 , unable to obtain urine samples for urine sodium and urine osmolarity.  Patient was given a trial of 500 mL of fluid bolus with worsening hyponatremia she was placed again on her.  CAPD to improve volume status and her sodium continues to worsen .  We will add sodium chloride tablets 1 g 3 times daily and trial of urea 15 g BID      -Hypertension secondary to end-stage renal disease.  We will continue carvedilol  Continue heart healthy diet      -Hyperphosphatemia continue Renvela with meals, and will  follow phosphorus trend     -Lupus nephritis on  mycophenolate mofetil ,prednisone and Plaquenil      -Chronic normocytic anemia history of PRBC secondary to immunosuppressant and end-stage renal disease her hemoglobin is currently stable no need for YULIANA.  We will follow closely.  No evidence of active bleeding        PLAN:  Continue CAPD 8 exchanges a day with 2.5% solution.   Add a trial of sodium chloride tablets and urea.  We will continue to follow sodium closely  TRIAL of 1.8 % NSS 50 ml/hr for 6 hours   Continue surveillance labs    Discussed with nursing staff    Thank you for involving us in the care of Dee Herrera.  Please feel free to call with any questions.    Hair Mckeon MD  10/27/23  11:52 EDT    [facility]  [phone]    Please note that portions of this note were completed with a voice recognition program.

## 2023-10-27 NOTE — PROGRESS NOTES
"[facility] Follow Up    Chief Complaint: Follow up CHF    Interval History: Reports that she is breathing better.  Denies any pain at this time.    Objective:     Objective:  Temp:  [97.3 °F (36.3 °C)-97.9 °F (36.6 °C)] 97.9 °F (36.6 °C)  Heart Rate:  [] 50  Resp:  [16-18] 16  BP: ()/() 95/65     Intake/Output Summary (Last 24 hours) at 10/27/2023 0732  Last data filed at 10/27/2023 0631  Gross per 24 hour   Intake 90389 ml   Output 33583 ml   Net 3900 ml     Body mass index is 19.14 kg/m².      10/26/23  0012   Weight: 52.2 kg (115 lb)     Weight change:       Physical Exam:   General : Alert, cooperative, in no acute distress.  Neuro: Alert,cooperative and oriented.  Lungs: CTAB. Normal respiratory effort and rate.  CV: Regular rate and rhythm, normal S1 and S2, 4/6 systolic murmur, gallops or rubs.  ABD: Soft, nontender, nondistended. Positive bowel sounds.  Extr: No edema or cyanosis, moves all extremities.    Lab Review:   Results from last 7 days   Lab Units 10/26/23  1256 10/26/23  0649 10/26/23  0314 10/26/23  0054   SODIUM mmol/L 120* 122*   < > 122*   POTASSIUM mmol/L 4.4 4.0   < > 3.8   CHLORIDE mmol/L 81* 82*   < > 82*   CO2 mmol/L 20.0* 19.0*   < > 22.0   BUN mg/dL 53* 50*   < > 47*   CREATININE mg/dL 10.50* 9.72*   < > 9.75*   GLUCOSE mg/dL 106* 104*   < > 101*   CALCIUM mg/dL 9.4 9.6   < > 9.9   AST (SGOT) U/L  --   --   --  61*   ALT (SGPT) U/L  --   --   --  71*    < > = values in this interval not displayed.     Results from last 7 days   Lab Units 10/26/23  0649 10/26/23  0314 10/26/23  0054   HSTROP T ng/L 117* 123* 130*     Results from last 7 days   Lab Units 10/27/23  0650 10/26/23  0649   WBC 10*3/mm3 3.48 3.93   HEMOGLOBIN g/dL 10.6* 10.9*   HEMATOCRIT % 32.8* 34.2   PLATELETS 10*3/mm3 78* 104*     Results from last 7 days   Lab Units 10/26/23  0054   INR  1.15*   APTT seconds 29.0               Invalid input(s): \"LDLCALC\"  Results from last 7 days "   Lab Units 10/26/23  0054   PROBNP pg/mL >70,000.0*         I reviewed the patient's new clinical results.  I personally viewed and interpreted the patient's EKG  Current Medications:   Scheduled Meds:carvedilol, 25 mg, Oral, BID With Meals  Delflex-LC/2.5% Dextrose, 1.5 L, Intraperitoneal, 8 Exchanges Daily (Q3H)  docusate sodium, 100 mg, Oral, BID  hydroxychloroquine, 200 mg, Oral, Q24H  mycophenolate, 250 mg, Oral, Q12H  senna-docusate sodium, 2 tablet, Oral, BID  sevelamer, 800 mg, Oral, TID With Meals  sodium chloride, 10 mL, Intravenous, Q12H  torsemide, 200 mg, Oral, Daily  UltraBag/Dianeal/2.5% Dextrose low-elen (lucia #[id number]), 1,500 mL, Intraperitoneal, 8 Exchanges Daily (Q3H)      Continuous Infusions:     Allergies:  Allergies   Allergen Reactions    Minoxidil Other (See Comments) and Hives     Pericardial effusion .       Assessment/Plan:     1.  Shortness of breath.  Improved this morning following dialysis.  2.  Possible cardiomyopathy.  EF previously normal here but reportedly 40 to 45% on echocardiogram at [facility] in 9/2023.  Repeat echocardiogram pending.  3.  Valvular heart disease.  Again no significant valvular dysfunction on echocardiogram earlier this year here.  However recent echocardiogram at [facility] in 9/2023 indicated moderate to severe mitral and aortic valve regurgitation.  4.  Elevated troponin.  Due to poor renal clearance.  Had normal coronary arteries in 6/2023.  5.  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  6.  History of pericardial effusion.  Noted on echocardiogram earlier this year.  No mention of this on recent echocardiogram.  7.  Hypovolemic acute hyponatremia.  Lab work today pending.  8.  Hypertension.  Improved and actually on the lower end this morning.  9.  Chronic anemia  10.  Systemic lupus erythematosus    -We will ensure that echocardiogram is performed today.  We will follow-up on the results.    Juana Taylor MD  10/27/23  07:32  EDT    ADDENDUM:  Echocardiogram results discussed with Dr. Correa.  Shows new severe aortic valve regurgitation and findings of a mass on the aortic valve.  We will check blood cultures.  The patient will need a CHETNA.  We will tentatively plan for this on Monday after discussing with the patient.  Discussed the above with Dr. Lezama.

## 2023-10-27 NOTE — PROGRESS NOTES
[facility] Medicine Services  PROGRESS NOTE    Patient Name: Dee Herrera  : 1990  MRN: [id number]    Date of Admission: 10/26/2023  Primary Care Physician: Margarita Woods APRN    Subjective   Subjective     CC:  Follow-up low sodium    Subjective:  Patient says she is feeling little bit better today.  She feels a little less swollen.  No new complaints.    Review of Systems  No current fevers or chills  No current shortness of breath or cough  No current nausea, vomiting, or diarrhea  No current chest pain or palpitations      Objective   Objective     Vital Signs:   Temp:  [97.5 °F (36.4 °C)-97.9 °F (36.6 °C)] 97.9 °F (36.6 °C)  Heart Rate:  [50-82] 55  Resp:  [16-18] 18  BP: ()/(57-86) 94/57        Physical Exam:  Constitutional:Awake, alert  HENT: NCAT, mucous membranes moist, neck supple  Respiratory: No cough or wheezing, nonlabored breathing  Cardiovascular: Pulse rate is borderline bradycardic, normal radial pulses  Gastrointestinal:   soft, nontender, nondistended  Musculoskeletal: Thin frail and chronically debilitated appearance, BMI is 19, minimal lower extremity edema  Psychiatric: Reasonably calm affect, cooperative, conversational  Neurologic: No slurred speech or facial droop, follows commands  Skin: No rashes or jaundice, warm      Results Reviewed:  Results from last 7 days   Lab Units 10/27/23  0650 10/26/23  0649 10/26/23  0054   WBC 10*3/mm3 3.48 3.93 4.49   HEMOGLOBIN g/dL 10.6* 10.9* 11.1*   HEMATOCRIT % 32.8* 34.2 34.8   PLATELETS 10*3/mm3 78* 104* 121*   INR   --   --  1.15*     Results from last 7 days   Lab Units 10/27/23  0650 10/26/23  1256 10/26/23  0649 10/26/23  0314 10/26/23  0054   SODIUM mmol/L 121* 120* 122* 122* 122*   POTASSIUM mmol/L 3.7 4.4 4.0 4.0 3.8   CHLORIDE mmol/L 83* 81* 82* 82* 82*   CO2 mmol/L 19.9* 20.0* 19.0* 18.0* 22.0   BUN mg/dL 50* 53* 50* 49* 47*   CREATININE mg/dL 10.02* 10.50* 9.72* 9.48* 9.75*   GLUCOSE mg/dL 134* 106* 104* 95 101*    CALCIUM mg/dL 8.7 9.4 9.6 9.8 9.9   ALK PHOS U/L  --   --   --   --  116   ALT (SGPT) U/L  --   --   --   --  71*   AST (SGOT) U/L  --   --   --   --  61*   HSTROP T ng/L  --   --  117* 123* 130*   PROBNP pg/mL  --   --   --   --  >70,000.0*     Estimated Creatinine Clearance: 6.7 mL/min (A) (by C-G formula based on SCr of 10.02 mg/dL (H)).    Microbiology Results Abnormal       Procedure Component Value - Date/Time    Respiratory Panel PCR w/COVID-19(SARS-CoV-2) [facility]/CASSY/MIRIAM/PAD/[facility]/MAD/NABEEL In-House, NP Swab in UTM/VTM, 3-4 HR TAT - Swab, Nasopharynx [[id number]]  (Normal) Collected: 10/26/23 0057    Lab Status: Final result Specimen: Swab from Nasopharynx Updated: 10/26/23 0152     ADENOVIRUS, PCR Not Detected     Coronavirus 229E Not Detected     Coronavirus HKU1 Not Detected     Coronavirus NL63 Not Detected     Coronavirus OC43 Not Detected     COVID19 Not Detected     Human Metapneumovirus Not Detected     Human Rhinovirus/Enterovirus Not Detected     Influenza A PCR Not Detected     Influenza B PCR Not Detected     Parainfluenza Virus 1 Not Detected     Parainfluenza Virus 2 Not Detected     Parainfluenza Virus 3 Not Detected     Parainfluenza Virus 4 Not Detected     RSV, PCR Not Detected     Bordetella pertussis pcr Not Detected     Bordetella parapertussis PCR Not Detected     Chlamydophila pneumoniae PCR Not Detected     Mycoplasma pneumo by PCR Not Detected    Narrative:      In the setting of a positive respiratory panel with a viral infection PLUS a negative procalcitonin without other underlying concern for bacterial infection, consider observing off antibiotics or discontinuation of antibiotics and continue supportive care. If the respiratory panel is positive for atypical bacterial infection (Bordetella pertussis, Chlamydophila pneumoniae, or Mycoplasma pneumoniae), consider antibiotic de-escalation to target atypical bacterial infection.            Imaging Results (Last 24 Hours)       ** No  results found for the last 24 hours. **            Results for orders placed during the hospital encounter of 02/13/23    Adult Transthoracic Echo Complete W/ Cont if Necessary Per Protocol    Interpretation Summary    Left ventricular systolic function is normal. Calculated left ventricular EF = 59.1%    Left ventricular wall thickness is consistent with severe concentric hypertrophy.    Differential diagnosis includes infiltrative cardiomyopathy, long-standing HTN and/or chronic renal insufficiency. Apical sparing on strain imaging is present, consistent with amyloid heart disease, suggest clinical correlation    Left ventricular diastolic function is consistent with (grade II w/high LAP) pseudonormalization.    There is moderate, bileaflet mitral valve thickening present.    There is a moderate-large circumferential pericardial effusion; there is somewhat greater effusion posteriorly. There is no evidence of cardiac tamponade.    Elevated left atrial pressure.      I have reviewed the medications:  Scheduled Meds:carvedilol, 25 mg, Oral, BID With Meals  Delflex-LC/2.5% Dextrose, 1.5 L, Intraperitoneal, 8 Exchanges Daily (Q3H)  docusate sodium, 100 mg, Oral, BID  hydroxychloroquine, 200 mg, Oral, Q24H  mycophenolate, 250 mg, Oral, Q12H  senna-docusate sodium, 2 tablet, Oral, BID  sevelamer, 1,600 mg, Oral, TID With Meals  sodium chloride, 10 mL, Intravenous, Q12H  sodium chloride, 1 g, Oral, Q6H  torsemide, 200 mg, Oral, Daily  UltraBag/Dianeal/2.5% Dextrose low-elne (lucia #[id number]), 1,500 mL, Intraperitoneal, 8 Exchanges Daily (Q3H)  Urea, 15 g, Oral, BID      Continuous Infusions:   PRN Meds:.  acetaminophen **OR** acetaminophen **OR** acetaminophen    senna-docusate sodium **AND** polyethylene glycol **AND** bisacodyl **AND** bisacodyl    calcium carbonate    labetalol    nitroglycerin    ondansetron **OR** ondansetron    prochlorperazine **OR** prochlorperazine **OR** prochlorperazine    [COMPLETED] Insert  Peripheral IV **AND** sodium chloride    sodium chloride    sodium chloride    Assessment & Plan   Assessment & Plan     Active Hospital Problems    Diagnosis  POA    **Hyponatremia [E87.1]  Yes    Poor appetite [R63.0]  Yes    Moderate malnutrition [E44.0]  Yes    Chronic diastolic CHF (congestive heart failure) [I50.32]  Yes    Anemia due to chronic kidney disease, on chronic dialysis [N18.6, D63.1, Z99.2]  Not Applicable    Peritoneal dialysis catheter in place [Z99.2]  Not Applicable    Abdominal pain [R10.9]  Yes    Essential hypertension [I10]  Yes    End stage renal disease on dialysis [N18.6, Z99.2]  Not Applicable    Seizure disorder [G40.909]  Yes    Elevated liver function tests [R79.89]  Yes    Pericardial effusion [I31.39]  Yes    Systemic lupus erythematosus [M32.9]  Yes    Thrombocytopenia [D69.6]  Yes    Hypertension secondary to other renal disorders [I15.1]  Yes    Vitamin D deficiency [E55.9]  Yes      Resolved Hospital Problems   No resolved problems to display.        Brief Hospital Course to date:  Dee Herrera is a 33 y.o. female presents to the hospital with weakness and shortness of breath found to have severe hyponatremia.     Hyponatremia:  Trend sodium level.  Salt tablets.  Urea.  Nephrology adjusting peritoneal dialysis.  Monitor for any associated issues with metabolic problem.     Hypertensive urgency with essential hypertension:  Initially severely elevated blood pressure.  Possible noncompliance with home medications or dialysis.  Blood pressure improved.  Stop calcium channel blocker for now.  Blood pressure is quite labile.  Labetalol as needed for greater than 180.     End-stage renal disease on peritoneal dialysis:  Nephrology consult.  Dialyze as needed.  Electrolytes reviewed and patient has metabolic acidosis.     Chronic diastolic heart failure with history of pericardial effusion:  Continue home cardiac medications.  Consult cardiology, sees Dr. Taylor.  In the setting of  shortness of breath and known pericardial effusion plan to recheck echocardiogram to reevaluate.     Malnutrition and poor appetite:  Zofran as needed for nausea.  Nutrition consult and maximize nutrition is much as needed.     Chronic abdominal pain: Possibly related to heart failure, electrolyte abnormalities, or pulmonary hypertension.  Chronic issue now symptoms resolved.  Abdominal exam is benign.     Thrombocytopenia: Chronic issue.  No bleeding noted.     Lupus:, Immunosuppressed: Continue Plaquenil and CellCept.  Monitor carefully     Anemia of chronic kidney disease: Trend hemoglobin.     Seizure disorder: Denies any recent seizures for several years.  Monitor.        DVT Prophylaxis: Mechanical      Disposition: Likely home when improved    CODE STATUS:   Code Status and Medical Interventions:   Ordered at: 10/26/23 0434     Code Status (Patient has no pulse and is not breathing):    CPR (Attempt to Resuscitate)     Medical Interventions (Patient has pulse or is breathing):    Full Support       Mike Lezama MD  10/27/23

## 2023-10-27 NOTE — PLAN OF CARE
Goal Outcome Evaluation:  Plan of Care Reviewed With: patient        Progress: no change  Outcome Evaluation: Resting in bed, echo completed today, dony PD exchanges Q3hrs, PD output, clear, light yellow, no c/o abd cramps, PD site with dsg, clear,intact, eating fair, fluid intake monitored, sb this shift, sbp less than 100 all day, nad, call light within reach.

## 2023-10-28 PROBLEM — R10.9 ABDOMINAL PAIN: Status: RESOLVED | Noted: 2023-02-18 | Resolved: 2023-10-28

## 2023-10-28 PROBLEM — I35.8 AORTIC VALVE MASS: Status: ACTIVE | Noted: 2023-10-28

## 2023-10-28 PROBLEM — I35.1 SEVERE AORTIC VALVE REGURGITATION: Status: ACTIVE | Noted: 2023-10-28

## 2023-10-28 LAB
ANION GAP SERPL CALCULATED.3IONS-SCNC: 14.8 MMOL/L (ref 5–15)
BUN SERPL-MCNC: 40 MG/DL (ref 6–20)
BUN/CREAT SERPL: 5.1 (ref 7–25)
CALCIUM SPEC-SCNC: 8.4 MG/DL (ref 8.6–10.5)
CHLORIDE SERPL-SCNC: 89 MMOL/L (ref 98–107)
CO2 SERPL-SCNC: 24.2 MMOL/L (ref 22–29)
CREAT SERPL-MCNC: 7.92 MG/DL (ref 0.57–1)
DEPRECATED RDW RBC AUTO: 53.1 FL (ref 37–54)
EGFRCR SERPLBLD CKD-EPI 2021: 6.5 ML/MIN/1.73
ERYTHROCYTE [DISTWIDTH] IN BLOOD BY AUTOMATED COUNT: 18.4 % (ref 12.3–15.4)
GLUCOSE SERPL-MCNC: 133 MG/DL (ref 65–99)
HCT VFR BLD AUTO: 33.4 % (ref 34–46.6)
HGB BLD-MCNC: 10.9 G/DL (ref 12–15.9)
MCH RBC QN AUTO: 27.1 PG (ref 26.6–33)
MCHC RBC AUTO-ENTMCNC: 32.6 G/DL (ref 31.5–35.7)
MCV RBC AUTO: 83.1 FL (ref 79–97)
PLATELET # BLD AUTO: 102 10*3/MM3 (ref 140–450)
PMV BLD AUTO: 12 FL (ref 6–12)
POTASSIUM SERPL-SCNC: 3.1 MMOL/L (ref 3.5–5.2)
RBC # BLD AUTO: 4.02 10*6/MM3 (ref 3.77–5.28)
SODIUM SERPL-SCNC: 128 MMOL/L (ref 136–145)
WBC NRBC COR # BLD: 5.02 10*3/MM3 (ref 3.4–10.8)

## 2023-10-28 PROCEDURE — 85027 COMPLETE CBC AUTOMATED: CPT | Performed by: INTERNAL MEDICINE

## 2023-10-28 PROCEDURE — 63710000001 MYCOPHENOLATE MOFETIL PER 250 MG: Performed by: INTERNAL MEDICINE

## 2023-10-28 PROCEDURE — 80048 BASIC METABOLIC PNL TOTAL CA: CPT | Performed by: INTERNAL MEDICINE

## 2023-10-28 PROCEDURE — 99232 SBSQ HOSP IP/OBS MODERATE 35: CPT | Performed by: INTERNAL MEDICINE

## 2023-10-28 RX ORDER — HYDROCODONE BITARTRATE AND ACETAMINOPHEN 5; 325 MG/1; MG/1
1 TABLET ORAL ONCE
Status: COMPLETED | OUTPATIENT
Start: 2023-10-28 | End: 2023-10-28

## 2023-10-28 RX ORDER — SODIUM CHLORIDE 1 G/1
1 TABLET ORAL 2 TIMES DAILY WITH MEALS
Status: DISCONTINUED | OUTPATIENT
Start: 2023-10-28 | End: 2023-10-30

## 2023-10-28 RX ORDER — POTASSIUM CHLORIDE 750 MG/1
40 TABLET, FILM COATED, EXTENDED RELEASE ORAL ONCE
Status: COMPLETED | OUTPATIENT
Start: 2023-10-28 | End: 2023-10-28

## 2023-10-28 RX ORDER — POTASSIUM CHLORIDE 750 MG/1
40 TABLET, FILM COATED, EXTENDED RELEASE ORAL EVERY 4 HOURS
Status: DISPENSED | OUTPATIENT
Start: 2023-10-28 | End: 2023-10-28

## 2023-10-28 RX ORDER — DEXTROSE MONOHYDRATE, SODIUM CHLORIDE, SODIUM LACTATE, CALCIUM CHLORIDE, MAGNESIUM CHLORIDE 2.5; 538; 448; 18.4; 5.08 G/100ML; MG/100ML; MG/100ML; MG/100ML; MG/100ML
1.5 SOLUTION INTRAPERITONEAL
Status: DISCONTINUED | OUTPATIENT
Start: 2023-10-28 | End: 2023-10-28

## 2023-10-28 RX ORDER — DEXTROSE MONOHYDRATE, SODIUM CHLORIDE, SODIUM LACTATE, CALCIUM CHLORIDE, MAGNESIUM CHLORIDE 2.5; 538; 448; 18.4; 5.08 G/100ML; MG/100ML; MG/100ML; MG/100ML; MG/100ML
1.5 SOLUTION INTRAPERITONEAL
Status: DISCONTINUED | OUTPATIENT
Start: 2023-10-28 | End: 2023-10-31

## 2023-10-28 RX ADMIN — SODIUM CHLORIDE TAB 1 GM 1 G: 1 TAB at 00:28

## 2023-10-28 RX ADMIN — Medication 10 ML: at 22:02

## 2023-10-28 RX ADMIN — MYCOPHENOLATE MOFETIL 250 MG: 500 TABLET ORAL at 23:33

## 2023-10-28 RX ADMIN — POTASSIUM CHLORIDE 40 MEQ: 750 TABLET, EXTENDED RELEASE ORAL at 13:50

## 2023-10-28 RX ADMIN — MYCOPHENOLATE MOFETIL 250 MG: 500 TABLET ORAL at 00:28

## 2023-10-28 RX ADMIN — POTASSIUM CHLORIDE 40 MEQ: 750 TABLET, EXTENDED RELEASE ORAL at 22:28

## 2023-10-28 RX ADMIN — HYDROXYCHLOROQUINE SULFATE 200 MG: 200 TABLET ORAL at 08:11

## 2023-10-28 RX ADMIN — DEXTROSE MONOHYDRATE, SODIUM CHLORIDE, SODIUM LACTATE, CALCIUM CHLORIDE, MAGNESIUM CHLORIDE 1500 ML: 2.5; 538; 448; 18.4; 5.08 SOLUTION INTRAPERITONEAL at 21:55

## 2023-10-28 RX ADMIN — ANTACID TABLETS 2 TABLET: 500 TABLET, CHEWABLE ORAL at 02:57

## 2023-10-28 RX ADMIN — DEXTROSE MONOHYDRATE, SODIUM CHLORIDE, SODIUM LACTATE, CALCIUM CHLORIDE, MAGNESIUM CHLORIDE 1500 ML: 2.5; 538; 448; 18.4; 5.08 SOLUTION INTRAPERITONEAL at 04:00

## 2023-10-28 RX ADMIN — SEVELAMER CARBONATE 1600 MG: 800 TABLET, FILM COATED ORAL at 17:34

## 2023-10-28 RX ADMIN — DEXTROSE MONOHYDRATE, SODIUM CHLORIDE, SODIUM LACTATE, CALCIUM CHLORIDE, MAGNESIUM CHLORIDE 1500 ML: 2.5; 538; 448; 18.4; 5.08 SOLUTION INTRAPERITONEAL at 08:05

## 2023-10-28 RX ADMIN — HYDROCODONE BITARTRATE AND ACETAMINOPHEN 1 TABLET: 5; 325 TABLET ORAL at 23:33

## 2023-10-28 RX ADMIN — ACETAMINOPHEN 650 MG: 325 TABLET ORAL at 21:58

## 2023-10-28 RX ADMIN — DEXTROSE MONOHYDRATE, SODIUM CHLORIDE, SODIUM LACTATE, CALCIUM CHLORIDE, MAGNESIUM CHLORIDE 1500 ML: 2.5; 538; 448; 18.4; 5.08 SOLUTION INTRAPERITONEAL at 17:13

## 2023-10-28 RX ADMIN — DEXTROSE MONOHYDRATE, SODIUM CHLORIDE, SODIUM LACTATE, CALCIUM CHLORIDE, MAGNESIUM CHLORIDE 1500 ML: 2.5; 538; 448; 18.4; 5.08 SOLUTION INTRAPERITONEAL at 00:39

## 2023-10-28 NOTE — PLAN OF CARE
Goal Outcome Evaluation:      Pt continues to remain stable, dialysis x3, DC cancelled, Pt refuses some meds,  no complications, will continue to monitor

## 2023-10-28 NOTE — PROGRESS NOTES
LOS: 2 days   Patient Care Team:  Margarita Woods APRN as PCP - General (Nurse Practitioner)  Winnie Sanchez MD as Referring Physician (Obstetrics and Gynecology)  Norberto Almaraz MD PhD as Consulting Physician (Hematology and Oncology)  Lupis Thomas MD as Consulting Physician (Nephrology)  Hair Mckeon MD as Consulting Physician (Nephrology)  Juana Taylor MD as Consulting Physician (Cardiology)    Chief Complaint:     F/u valvular disease    Interval History:     She denies chest pain or difficulty breathing.  She has no nausea or dizziness.  She has no headache.    Echo 10/27/23    There is a large mobile echodensity which appears to be attached to the right coronary leaflet of the aortic valve. Differential includes vegetation, thrombus, and fibroelastoma    Severe aortic valve regurgitation is present.    The left ventricular cavity is borderline dilated.    Left ventricular systolic function is mildly decreased. Left ventricular ejection fraction appears to be 46 - 50%.    Left ventricular wall thickness is consistent with moderate to severe concentric hypertrophy.    The myocardium is speckled and bright in appearance, consistent with infiltrative cardiomyopathy versus chronic kidney disease related changes.    There is grade III restrictive diastolic dysfunction (Valsalva not performed)    The left atrial cavity is moderately dilated.    There is moderate posteriorly directed mitral regurgitation    Mild to moderate tricuspid valve regurgitation is present.    Calculated right ventricular systolic pressure from tricuspid regurgitation is 45 mmHg.    The ascending aorta is mildly enlarged at 3.8 cm. The main pulmonary artery is enlarged at 2.5 cm    There is a trivial pericardial effusion.    Objective   Vital Signs  Temp:  [97.5 °F (36.4 °C)-98.4 °F (36.9 °C)] 98.2 °F (36.8 °C)  Heart Rate:  [53-63] 63  Resp:  [18] 18  BP: ()/(51-62) 112/60    Intake/Output Summary (Last 24  hours) at 10/28/2023 1126  Last data filed at 10/28/2023 0838  Gross per 24 hour   Intake 70387 ml   Output 34724 ml   Net -6540 ml       Comfortable NAD  Neck supple, no JVD or thyromegaly appreciated  S1/S2 RRR, no /r/g 3/6 alonso and 2/6 diastolic murmur  Lungs CTA B, normal effort  Abdomen S/NT/ND (+) BS, no HSM appreciated  Extremities warm, no clubbing, cyanosis, or edema  No visible or palpable skin lesions  A/Ox4, mood and affect appropriate    Results Review:      Results from last 7 days   Lab Units 10/28/23  0551 10/27/23  0650 10/26/23  1256   SODIUM mmol/L 128* 121* 120*   POTASSIUM mmol/L 3.1* 3.7 4.4   CHLORIDE mmol/L 89* 83* 81*   CO2 mmol/L 24.2 19.9* 20.0*   BUN mg/dL 40* 50* 53*   CREATININE mg/dL 7.92* 10.02* 10.50*   GLUCOSE mg/dL 133* 134* 106*   CALCIUM mg/dL 8.4* 8.7 9.4     Results from last 7 days   Lab Units 10/26/23  0649 10/26/23  0314 10/26/23  0054   HSTROP T ng/L 117* 123* 130*     Results from last 7 days   Lab Units 10/28/23  0551 10/27/23  0650 10/26/23  0649   WBC 10*3/mm3 5.02 3.48 3.93   HEMOGLOBIN g/dL 10.9* 10.6* 10.9*   HEMATOCRIT % 33.4* 32.8* 34.2   PLATELETS 10*3/mm3 102* 78* 104*     Results from last 7 days   Lab Units 10/26/23  0054   INR  1.15*   APTT seconds 29.0                   I reviewed the patient's new clinical results.  I personally viewed and interpreted the patient's EKG/Telemetry data        Medication Review:   carvedilol, 25 mg, Oral, BID With Meals  docusate sodium, 100 mg, Oral, BID  hydroxychloroquine, 200 mg, Oral, Q24H  mycophenolate, 250 mg, Oral, Q12H  potassium chloride, 40 mEq, Oral, Q4H  senna-docusate sodium, 2 tablet, Oral, BID  sevelamer, 1,600 mg, Oral, TID With Meals  sodium chloride, 10 mL, Intravenous, Q12H  sodium chloride, 1 g, Oral, BID With Meals             Assessment & Plan       Hyponatremia    Vitamin D deficiency    Thrombocytopenia    Hypertension secondary to other renal disorders    Systemic lupus erythematosus    Pericardial  effusion    End stage renal disease on dialysis    Seizure disorder    Elevated liver function tests    Essential hypertension    Peritoneal dialysis catheter in place    Anemia due to chronic kidney disease, on chronic dialysis    Poor appetite    Moderate malnutrition    Chronic diastolic CHF (congestive heart failure)    1.  Shortness of breath.  Improved this morning following dialysis.  2.  Possible cardiomyopathy.  EF previously normal here but reportedly 40 to 45% on echocardiogram at [facility] in 9/2023.    3.  Valvular heart disease-aortic insufficiency with mobile density on aortic valve/right coronary cusp.  Needs CHETNA Monday.  Blood cultures were sent 10/27/2023  4.  Elevated troponin.  Due to poor renal clearance.  Had normal coronary arteries in 6/2023.  5.  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  6.  History of pericardial effusion.  Noted on echocardiogram earlier this year.  No mention of this on recent echocardiogram.  7.  Hypovolemic acute hyponatremia.  Lab work today pending.  8.  Hypertension.  Improved and actually on the lower end this morning.  9.  Chronic anemia  10.  Systemic lupus erythematosus    Will need CHETNA on Monday with anesthesia in PACU. Will see her again Monday at the CHETNA.  I spoke with patient about plan of care.  I also called family member Airam Herrera about plan of care.    Crystal Torres MD  10/28/23  11:26 EDT

## 2023-10-28 NOTE — PLAN OF CARE
Goal Outcome Evaluation:      Patient resting in bed with some complaints of mild cramping after her Peritoneal dialysis is done draining. VSS, She has been non-compliant with some oral medications but was able to take her cellcept and blood pressure medication.

## 2023-10-28 NOTE — PROGRESS NOTES
[facility] Medicine Services  PROGRESS NOTE    Patient Name: Dee Herrera  : 1990  MRN: [id number]    Date of Admission: 10/26/2023  Primary Care Physician: Margarita Woods APRN    Subjective   Subjective     CC:  Follow-up low sodium    Subjective:  Feels better.  No new issues wants to go.  Discussed need for CHETNA and she is frustrated with new finding.     Review of Systems  No current fevers or chills  No current shortness of breath or cough  No current nausea, vomiting, or diarrhea  No current chest pain or palpitations      Objective   Objective     Vital Signs:   Temp:  [97.5 °F (36.4 °C)-98.4 °F (36.9 °C)] 98.2 °F (36.8 °C)  Heart Rate:  [53-63] 63  Resp:  [18] 18  BP: ()/(51-60) 112/60        Physical Exam:  Constitutional:Awake, alert  HENT: NCAT, mucous membranes moist, neck supple  Respiratory: No cough or wheezing, nonlabored breathing  Cardiovascular: Pulse rate is normal, normal radial pulses  Gastrointestinal:   soft, nontender, nondistended  Musculoskeletal: Thin frail and chronically debilitated appearance, BMI is 19, minimal lower extremity edema  Psychiatric: Reasonably calm affect, cooperative, conversational  Neurologic: No slurred speech or facial droop, follows commands  Skin: No rashes or jaundice, warm      Results Reviewed:  Results from last 7 days   Lab Units 10/28/23  0551 10/27/23  0650 10/26/23  0649 10/26/23  0054   WBC 10*3/mm3 5.02 3.48 3.93 4.49   HEMOGLOBIN g/dL 10.9* 10.6* 10.9* 11.1*   HEMATOCRIT % 33.4* 32.8* 34.2 34.8   PLATELETS 10*3/mm3 102* 78* 104* 121*   INR   --   --   --  1.15*     Results from last 7 days   Lab Units 10/28/23  0551 10/27/23  0650 10/26/23  1256 10/26/23  0649 10/26/23  0314 10/26/23  0054   SODIUM mmol/L 128* 121* 120* 122* 122* 122*   POTASSIUM mmol/L 3.1* 3.7 4.4 4.0 4.0 3.8   CHLORIDE mmol/L 89* 83* 81* 82* 82* 82*   CO2 mmol/L 24.2 19.9* 20.0* 19.0* 18.0* 22.0   BUN mg/dL 40* 50* 53* 50* 49* 47*   CREATININE mg/dL 7.92*  10.02* 10.50* 9.72* 9.48* 9.75*   GLUCOSE mg/dL 133* 134* 106* 104* 95 101*   CALCIUM mg/dL 8.4* 8.7 9.4 9.6 9.8 9.9   ALK PHOS U/L  --   --   --   --   --  116   ALT (SGPT) U/L  --   --   --   --   --  71*   AST (SGOT) U/L  --   --   --   --   --  61*   HSTROP T ng/L  --   --   --  117* 123* 130*   PROBNP pg/mL  --   --   --   --   --  >70,000.0*     Estimated Creatinine Clearance: 8.5 mL/min (A) (by C-G formula based on SCr of 7.92 mg/dL (H)).    Microbiology Results Abnormal       Procedure Component Value - Date/Time    Respiratory Panel PCR w/COVID-19(SARS-CoV-2) [facility]ASSY/MIRIAM/[facility]/MAD/NABEEL In-House, NP Swab in UTM/VTM, 3-4 HR TAT - Swab, Nasopharynx [[id number]]  (Normal) Collected: 10/26/23 0057    Lab Status: Final result Specimen: Swab from Nasopharynx Updated: 10/26/23 0152     ADENOVIRUS, PCR Not Detected     Coronavirus 229E Not Detected     Coronavirus HKU1 Not Detected     Coronavirus NL63 Not Detected     Coronavirus OC43 Not Detected     COVID19 Not Detected     Human Metapneumovirus Not Detected     Human Rhinovirus/Enterovirus Not Detected     Influenza A PCR Not Detected     Influenza B PCR Not Detected     Parainfluenza Virus 1 Not Detected     Parainfluenza Virus 2 Not Detected     Parainfluenza Virus 3 Not Detected     Parainfluenza Virus 4 Not Detected     RSV, PCR Not Detected     Bordetella pertussis pcr Not Detected     Bordetella parapertussis PCR Not Detected     Chlamydophila pneumoniae PCR Not Detected     Mycoplasma pneumo by PCR Not Detected    Narrative:      In the setting of a positive respiratory panel with a viral infection PLUS a negative procalcitonin without other underlying concern for bacterial infection, consider observing off antibiotics or discontinuation of antibiotics and continue supportive care. If the respiratory panel is positive for atypical bacterial infection (Bordetella pertussis, Chlamydophila pneumoniae, or Mycoplasma pneumoniae), consider antibiotic  de-escalation to target atypical bacterial infection.            Imaging Results (Last 24 Hours)       ** No results found for the last 24 hours. **            Results for orders placed during the hospital encounter of 10/26/23    Adult Transthoracic Echo Complete W/ Cont if Necessary Per Protocol    Interpretation Summary    There is a large mobile echodensity which appears to be attached to the right coronary leaflet of the aortic valve. Differential includes vegetation, thrombus, and fibroelastoma    Severe aortic valve regurgitation is present.    The left ventricular cavity is borderline dilated.    Left ventricular systolic function is mildly decreased. Left ventricular ejection fraction appears to be 46 - 50%.    Left ventricular wall thickness is consistent with moderate to severe concentric hypertrophy.    The myocardium is speckled and bright in appearance, consistent with infiltrative cardiomyopathy versus chronic kidney disease related changes.    There is grade III restrictive diastolic dysfunction (Valsalva not performed)    The left atrial cavity is moderately dilated.    There is moderate posteriorly directed mitral regurgitation    Mild to moderate tricuspid valve regurgitation is present.    Calculated right ventricular systolic pressure from tricuspid regurgitation is 45 mmHg.    The ascending aorta is mildly enlarged at 3.8 cm. The main pulmonary artery is enlarged at 2.5 cm    There is a trivial pericardial effusion.      I have reviewed the medications:  Scheduled Meds:carvedilol, 25 mg, Oral, BID With Meals  docusate sodium, 100 mg, Oral, BID  hydroxychloroquine, 200 mg, Oral, Q24H  mycophenolate, 250 mg, Oral, Q12H  potassium chloride, 40 mEq, Oral, Q4H  senna-docusate sodium, 2 tablet, Oral, BID  sevelamer, 1,600 mg, Oral, TID With Meals  sodium chloride, 10 mL, Intravenous, Q12H  sodium chloride, 1 g, Oral, BID With Meals      Continuous Infusions:   PRN Meds:.  acetaminophen **OR**  acetaminophen **OR** acetaminophen    senna-docusate sodium **AND** polyethylene glycol **AND** bisacodyl **AND** bisacodyl    calcium carbonate    labetalol    nitroglycerin    ondansetron **OR** ondansetron    prochlorperazine **OR** prochlorperazine **OR** prochlorperazine    [COMPLETED] Insert Peripheral IV **AND** sodium chloride    sodium chloride    sodium chloride    Assessment & Plan   Assessment & Plan     Active Hospital Problems    Diagnosis  POA    **Hyponatremia [E87.1]  Yes    Aortic valve lesion [I35.8]  Yes    Severe aortic valve regurgitation [I35.1]  Yes    Poor appetite [R63.0]  Yes    Moderate malnutrition [E44.0]  Yes    Chronic diastolic CHF (congestive heart failure) [I50.32]  Yes    Anemia due to chronic kidney disease, on chronic dialysis [N18.6, D63.1, Z99.2]  Not Applicable    Peritoneal dialysis catheter in place [Z99.2]  Not Applicable    Essential hypertension [I10]  Yes    End stage renal disease on dialysis [N18.6, Z99.2]  Not Applicable    Seizure disorder [G40.909]  Yes    Elevated liver function tests [R79.89]  Yes    Pericardial effusion [I31.39]  Yes    Systemic lupus erythematosus [M32.9]  Yes    Thrombocytopenia [D69.6]  Yes    Hypertension secondary to other renal disorders [I15.1]  Yes    Vitamin D deficiency [E55.9]  Yes      Resolved Hospital Problems    Diagnosis Date Resolved POA    Abdominal pain [R10.9] 10/28/2023 Yes        Brief Hospital Course to date:  Dee Herrera is a 33 y.o. female presents to the hospital with weakness and shortness of breath found to have severe hyponatremia.     Discussion/plan for today:  Case discussed with nephrology.  Sodium level is better.  Treatment with salt tablets and urea.  They are making adjustments to peritoneal dialysis catheter.  Case discussed with cardiology.  Vegetation and regurgitation noted on aortic valve.  Blood cultures have been sent and are pending.  Concern for possible endocarditis versus other etiology of  vegetation.  Currently afebrile and hemodynamically stable.  Plan is for tentative CHETNA on Monday.  Patient is very frustrated she needs to stay in the hospital longer but we are providing encouragement.    Hyponatremia:  Trend sodium level.  Salt tablets.  Urea.  Nephrology adjusting peritoneal dialysis.  Monitor for any associated issues with metabolic problem.     Hypertensive urgency with essential hypertension:  Initially severely elevated blood pressure.  Possible noncompliance with home medications or dialysis.  Blood pressure improved.  Stop calcium channel blocker for now.  Blood pressure is quite labile.  Labetalol as needed for greater than 180.     End-stage renal disease on peritoneal dialysis:  Nephrology consult.  Dialyze as needed.  Electrolytes reviewed and patient has metabolic acidosis.     Chronic diastolic heart failure with history of pericardial effusion:  Continue home cardiac medications.  Consult cardiology, sees Dr. Taylor.  In the setting of shortness of breath and known pericardial effusion plan to recheck echocardiogram to reevaluate.     Malnutrition and poor appetite:  Zofran as needed for nausea.  Nutrition consult and maximize nutrition is much as needed.     Chronic abdominal pain: Possibly related to heart failure, electrolyte abnormalities, or pulmonary hypertension.  Chronic issue now symptoms resolved.  Abdominal exam is benign.     Thrombocytopenia: Chronic issue.  No bleeding noted.     Lupus:, Immunosuppressed: Continue Plaquenil and CellCept.  Monitor carefully     Anemia of chronic kidney disease: Trend hemoglobin.     Seizure disorder: Denies any recent seizures for several years.  Monitor.        DVT Prophylaxis: Mechanical      Disposition: Likely home pending clinical course    CODE STATUS:   Code Status and Medical Interventions:   Ordered at: 10/26/23 0434     Code Status (Patient has no pulse and is not breathing):    CPR (Attempt to Resuscitate)     Medical  Interventions (Patient has pulse or is breathing):    Full Support       Mike Lezama MD  10/28/23

## 2023-10-28 NOTE — PROGRESS NOTES
[facility] Progress Note      Patient Name: Dee Herrera  : 1990  MRN: [id number]  Primary Care Physician:  Margarita Woods APRN  Date of admission: 10/26/2023    Subjective     Interval History:     Patient lying in bed feeling better  Denies any chest pain shortness of breath  Tolerating oral intake  Undergoing PD without any difficulty  Having regular bowel movements    Review of Systems:   As noted above    Objective     Vitals:   Temp:  [97.5 °F (36.4 °C)-98.4 °F (36.9 °C)] 98.2 °F (36.8 °C)  Heart Rate:  [53-63] 63  Resp:  [18] 18  BP: ()/(51-62) 112/60    Intake/Output Summary (Last 24 hours) at 10/28/2023 0938  Last data filed at 10/28/2023 0838  Gross per 24 hour   Intake 20566 ml   Output 56295 ml   Net -6540 ml       Physical Exam:    General Appearance: alert, oriented x 3, no acute distress   Skin: warm and dry  HEENT: oral mucosa normal, nonicteric sclera  Neck: supple, no JVD  Lungs: Clear bilateral  Heart: RRR, normal S1 and S2  Abdomen: soft, nontender, nondistended  : no palpable bladder PD catheter in place with clean exit site  Extremities: no edema, cyanosis or clubbing  Neuro: normal speech and mental status     Scheduled Meds:     carvedilol, 25 mg, Oral, BID With Meals  docusate sodium, 100 mg, Oral, BID  hydroxychloroquine, 200 mg, Oral, Q24H  mycophenolate, 250 mg, Oral, Q12H  potassium chloride, 40 mEq, Oral, Q4H  senna-docusate sodium, 2 tablet, Oral, BID  sevelamer, 1,600 mg, Oral, TID With Meals  sodium chloride, 10 mL, Intravenous, Q12H  sodium chloride, 1 g, Oral, BID With Meals      IV Meds:        Results Reviewed:   I have personally reviewed the results from the time of this admission to 10/28/2023 09:38 EDT     Results from last 7 days   Lab Units 10/28/23  0551 10/27/23  0650 10/26/23  1256 10/26/23  0314 10/26/23  0054   SODIUM mmol/L 128* 121* 120*   < > 122*   POTASSIUM mmol/L 3.1* 3.7 4.4   < > 3.8   CHLORIDE mmol/L 89*  83* 81*   < > 82*   CO2 mmol/L 24.2 19.9* 20.0*   < > 22.0   BUN mg/dL 40* 50* 53*   < > 47*   CREATININE mg/dL 7.92* 10.02* 10.50*   < > 9.75*   CALCIUM mg/dL 8.4* 8.7 9.4   < > 9.9   BILIRUBIN mg/dL  --   --   --   --  0.8   ALK PHOS U/L  --   --   --   --  116   ALT (SGPT) U/L  --   --   --   --  71*   AST (SGOT) U/L  --   --   --   --  61*   GLUCOSE mg/dL 133* 134* 106*   < > 101*    < > = values in this interval not displayed.       Estimated Creatinine Clearance: 8.5 mL/min (A) (by C-G formula based on SCr of 7.92 mg/dL (H)).    Results from last 7 days   Lab Units 10/27/23  0650   PHOSPHORUS mg/dL 6.6*             Results from last 7 days   Lab Units 10/28/23  0551 10/27/23  0650 10/26/23  0649 10/26/23  0054   WBC 10*3/mm3 5.02 3.48 3.93 4.49   HEMOGLOBIN g/dL 10.9* 10.6* 10.9* 11.1*   PLATELETS 10*3/mm3 102* 78* 104* 121*       Results from last 7 days   Lab Units 10/26/23  0054   INR  1.15*       Assessment / Plan     ASSESSMENT:    -End-stage renal disease on nocturnal peritoneal dialysis. ( Dianeal solution 2.5 %  8 liters , 4 exchanges of 2 liter  + 0.5 liter day exchange ) .  Patient can resume PD at home     -Hypotonic hyponatremia.  Uric acid 3.9,  Serum osmo 270 , unable to obtain urine samples for urine sodium and urine osmolarity.  Patient was given a trial of 500 mL of fluid bolus with worsening hyponatremia she was placed again on her.  CAPD to improve volume status and her sodium continues to worsen .  We will add sodium chloride tablets      -Hypertension secondary to end-stage renal disease.  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  We will follow  closely.  No evidence of active bleeding        PLAN:    Patient can be safely discharged to continue PD as an outpatient  Suggest  sodium chloride tablets twice a day for 7 days upon discharge  Continue her antihypertensive medication at home  Patient was encouraged to continue to increase her protein intake and limit free water intake  Extensive counseling was provided to patient  Patient has a schedule appointment next Monday with Dr. Diaz on the PD  renal clinic    Discussed with nursing staff and discussed with Dr. Mike Ybarra    Thank you for involving us in the care of Dee BARTHOLOMEW Javier.  Please feel free to call with any questions.    Hair Mckeon MD  10/28/23  09:38 EDT    [facility]  [phone]    Please note that portions of this note were completed with a voice recognition program.

## 2023-10-29 LAB
ALBUMIN SERPL-MCNC: 2.6 G/DL (ref 3.5–5.2)
ANION GAP SERPL CALCULATED.3IONS-SCNC: 12.3 MMOL/L (ref 5–15)
BUN SERPL-MCNC: 34 MG/DL (ref 6–20)
BUN/CREAT SERPL: 4.2 (ref 7–25)
CALCIUM SPEC-SCNC: 8.1 MG/DL (ref 8.6–10.5)
CHLORIDE SERPL-SCNC: 89 MMOL/L (ref 98–107)
CO2 SERPL-SCNC: 21.7 MMOL/L (ref 22–29)
CREAT SERPL-MCNC: 8.04 MG/DL (ref 0.57–1)
DEPRECATED RDW RBC AUTO: 53 FL (ref 37–54)
EGFRCR SERPLBLD CKD-EPI 2021: 6.3 ML/MIN/1.73
ERYTHROCYTE [DISTWIDTH] IN BLOOD BY AUTOMATED COUNT: 18.1 % (ref 12.3–15.4)
GLUCOSE SERPL-MCNC: 86 MG/DL (ref 65–99)
HCT VFR BLD AUTO: 34.2 % (ref 34–46.6)
HGB BLD-MCNC: 10.9 G/DL (ref 12–15.9)
MCH RBC QN AUTO: 26.6 PG (ref 26.6–33)
MCHC RBC AUTO-ENTMCNC: 31.9 G/DL (ref 31.5–35.7)
MCV RBC AUTO: 83.4 FL (ref 79–97)
PHOSPHATE SERPL-MCNC: 3.1 MG/DL (ref 2.5–4.5)
PLATELET # BLD AUTO: 120 10*3/MM3 (ref 140–450)
PMV BLD AUTO: 12.7 FL (ref 6–12)
POTASSIUM SERPL-SCNC: 4.8 MMOL/L (ref 3.5–5.2)
RBC # BLD AUTO: 4.1 10*6/MM3 (ref 3.77–5.28)
SODIUM SERPL-SCNC: 123 MMOL/L (ref 136–145)
WBC NRBC COR # BLD: 4.1 10*3/MM3 (ref 3.4–10.8)

## 2023-10-29 PROCEDURE — 63710000001 MYCOPHENOLATE MOFETIL PER 250 MG: Performed by: INTERNAL MEDICINE

## 2023-10-29 PROCEDURE — 85027 COMPLETE CBC AUTOMATED: CPT | Performed by: INTERNAL MEDICINE

## 2023-10-29 PROCEDURE — 99232 SBSQ HOSP IP/OBS MODERATE 35: CPT | Performed by: INTERNAL MEDICINE

## 2023-10-29 PROCEDURE — 80069 RENAL FUNCTION PANEL: CPT | Performed by: INTERNAL MEDICINE

## 2023-10-29 RX ORDER — TRAMADOL HYDROCHLORIDE 50 MG/1
50 TABLET ORAL EVERY 6 HOURS PRN
Status: DISCONTINUED | OUTPATIENT
Start: 2023-10-29 | End: 2023-11-02

## 2023-10-29 RX ORDER — TRAMADOL HYDROCHLORIDE 50 MG/1
50 TABLET ORAL EVERY 8 HOURS PRN
Status: DISCONTINUED | OUTPATIENT
Start: 2023-10-29 | End: 2023-10-29

## 2023-10-29 RX ADMIN — MYCOPHENOLATE MOFETIL 250 MG: 500 TABLET ORAL at 11:12

## 2023-10-29 RX ADMIN — DEXTROSE MONOHYDRATE, SODIUM CHLORIDE, SODIUM LACTATE, CALCIUM CHLORIDE, MAGNESIUM CHLORIDE 1500 ML: 2.5; 538; 448; 18.4; 5.08 SOLUTION INTRAPERITONEAL at 13:17

## 2023-10-29 RX ADMIN — SEVELAMER CARBONATE 1600 MG: 800 TABLET, FILM COATED ORAL at 08:23

## 2023-10-29 RX ADMIN — HYDROXYCHLOROQUINE SULFATE 200 MG: 200 TABLET ORAL at 08:22

## 2023-10-29 RX ADMIN — TRAMADOL HYDROCHLORIDE 50 MG: 50 TABLET ORAL at 11:12

## 2023-10-29 RX ADMIN — SODIUM CHLORIDE TAB 1 GM 1 G: 1 TAB at 11:19

## 2023-10-29 RX ADMIN — DEXTROSE MONOHYDRATE, SODIUM CHLORIDE, SODIUM LACTATE, CALCIUM CHLORIDE, MAGNESIUM CHLORIDE 1500 ML: 2.5; 538; 448; 18.4; 5.08 SOLUTION INTRAPERITONEAL at 07:43

## 2023-10-29 RX ADMIN — TRAMADOL HYDROCHLORIDE 50 MG: 50 TABLET ORAL at 17:00

## 2023-10-29 RX ADMIN — SODIUM CHLORIDE TAB 1 GM 1 G: 1 TAB at 19:53

## 2023-10-29 RX ADMIN — DEXTROSE MONOHYDRATE, SODIUM CHLORIDE, SODIUM LACTATE, CALCIUM CHLORIDE, MAGNESIUM CHLORIDE 1500 ML: 2.5; 538; 448; 18.4; 5.08 SOLUTION INTRAPERITONEAL at 20:45

## 2023-10-29 RX ADMIN — Medication 15 G: at 21:51

## 2023-10-29 RX ADMIN — DEXTROSE MONOHYDRATE, SODIUM CHLORIDE, SODIUM LACTATE, CALCIUM CHLORIDE, MAGNESIUM CHLORIDE 1500 ML: 2.5; 538; 448; 18.4; 5.08 SOLUTION INTRAPERITONEAL at 03:38

## 2023-10-29 RX ADMIN — DEXTROSE MONOHYDRATE, SODIUM CHLORIDE, SODIUM LACTATE, CALCIUM CHLORIDE, MAGNESIUM CHLORIDE 1500 ML: 2.5; 538; 448; 18.4; 5.08 SOLUTION INTRAPERITONEAL at 17:02

## 2023-10-29 RX ADMIN — SODIUM CHLORIDE: 4 INJECTION, SOLUTION, CONCENTRATE INTRAVENOUS at 13:17

## 2023-10-29 RX ADMIN — Medication 10 ML: at 08:23

## 2023-10-29 RX ADMIN — Medication 10 ML: at 21:51

## 2023-10-29 RX ADMIN — DEXTROSE MONOHYDRATE, SODIUM CHLORIDE, SODIUM LACTATE, CALCIUM CHLORIDE, MAGNESIUM CHLORIDE 1500 ML: 2.5; 538; 448; 18.4; 5.08 SOLUTION INTRAPERITONEAL at 23:52

## 2023-10-29 RX ADMIN — Medication 15 G: at 15:27

## 2023-10-29 RX ADMIN — TRAMADOL HYDROCHLORIDE 50 MG: 50 TABLET ORAL at 23:50

## 2023-10-29 NOTE — PROGRESS NOTES
[facility] Medicine Services  PROGRESS NOTE    Patient Name: Dee Herrera  : 1990  MRN: [id number]    Date of Admission: 10/26/2023  Primary Care Physician: Margarita Woods APRN    Subjective   Subjective     CC:  Follow-up low sodium    Subjective:  Patient agreeable to stay for the test tomorrow.  No new complaints.    Review of Systems  No current fevers or chills  No current shortness of breath or cough  No current nausea, vomiting, or diarrhea  No current chest pain or palpitations      Objective   Objective     Vital Signs:   Temp:  [98 °F (36.7 °C)-99.7 °F (37.6 °C)] 98 °F (36.7 °C)  Heart Rate:  [64-73] 64  Resp:  [18-20] 20  BP: ()/(58-70) 109/60        Physical Exam:  Constitutional:Awake, alert  HENT: NCAT, mucous membranes moist, neck supple  Respiratory: No cough or wheezing, nonlabored breathing  Cardiovascular: Pulse rate is normal, normal radial pulses  Gastrointestinal:   soft, nontender, nondistended  Musculoskeletal: Thin frail and chronically debilitated appearance, BMI is 19, minimal lower extremity edema  Psychiatric: Reasonably calm affect, cooperative, conversational  Neurologic: No slurred speech or facial droop, follows commands  Skin: No rashes or jaundice, warm      Results Reviewed:  Results from last 7 days   Lab Units 10/29/23  0645 10/28/23  0551 10/27/23  0650 10/26/23  0649 10/26/23  0054   WBC 10*3/mm3 4.10 5.02 3.48   < > 4.49   HEMOGLOBIN g/dL 10.9* 10.9* 10.6*   < > 11.1*   HEMATOCRIT % 34.2 33.4* 32.8*   < > 34.8   PLATELETS 10*3/mm3 120* 102* 78*   < > 121*   INR   --   --   --   --  1.15*    < > = values in this interval not displayed.     Results from last 7 days   Lab Units 10/29/23  0645 10/28/23  0551 10/27/23  0650 10/26/23  1256 10/26/23  0649 10/26/23  0314 10/26/23  0054   SODIUM mmol/L 123* 128* 121*   < > 122* 122* 122*   POTASSIUM mmol/L 4.8 3.1* 3.7   < > 4.0 4.0 3.8   CHLORIDE mmol/L 89* 89* 83*   < > 82* 82* 82*   CO2 mmol/L 21.7* 24.2  19.9*   < > 19.0* 18.0* 22.0   BUN mg/dL 34* 40* 50*   < > 50* 49* 47*   CREATININE mg/dL 8.04* 7.92* 10.02*   < > 9.72* 9.48* 9.75*   GLUCOSE mg/dL 86 133* 134*   < > 104* 95 101*   CALCIUM mg/dL 8.1* 8.4* 8.7   < > 9.6 9.8 9.9   ALK PHOS U/L  --   --   --   --   --   --  116   ALT (SGPT) U/L  --   --   --   --   --   --  71*   AST (SGOT) U/L  --   --   --   --   --   --  61*   HSTROP T ng/L  --   --   --   --  117* 123* 130*   PROBNP pg/mL  --   --   --   --   --   --  >70,000.0*    < > = values in this interval not displayed.     Estimated Creatinine Clearance: 8.4 mL/min (A) (by C-G formula based on SCr of 8.04 mg/dL (H)).    Microbiology Results Abnormal       Procedure Component Value - Date/Time    Blood Culture - Blood, Arm, Left [[id number]]  (Normal) Collected: 10/27/23 1652    Lab Status: Preliminary result Specimen: Blood from Arm, Left Updated: 10/28/23 1716     Blood Culture No growth at 24 hours    Blood Culture - Blood, Arm, Right [[id number]]  (Normal) Collected: 10/27/23 1521    Lab Status: Preliminary result Specimen: Blood from Arm, Right Updated: 10/28/23 1600     Blood Culture No growth at 24 hours    Respiratory Panel PCR w/COVID-19(SARS-CoV-2) [facility]/CASSY/MIRIAM/PAD/COR/MAD/NABEEL In-House, NP Swab in UTM/VTM, 3-4 HR TAT - Swab, Nasopharynx [[id number]]  (Normal) Collected: 10/26/23 0057    Lab Status: Final result Specimen: Swab from Nasopharynx Updated: 10/26/23 0152     ADENOVIRUS, PCR Not Detected     Coronavirus 229E Not Detected     Coronavirus HKU1 Not Detected     Coronavirus NL63 Not Detected     Coronavirus OC43 Not Detected     COVID19 Not Detected     Human Metapneumovirus Not Detected     Human Rhinovirus/Enterovirus Not Detected     Influenza A PCR Not Detected     Influenza B PCR Not Detected     Parainfluenza Virus 1 Not Detected     Parainfluenza Virus 2 Not Detected     Parainfluenza Virus 3 Not Detected     Parainfluenza Virus 4 Not Detected     RSV, PCR Not Detected     Bordetella  pertussis pcr Not Detected     Bordetella parapertussis PCR Not Detected     Chlamydophila pneumoniae PCR Not Detected     Mycoplasma pneumo by PCR Not Detected    Narrative:      In the setting of a positive respiratory panel with a viral infection PLUS a negative procalcitonin without other underlying concern for bacterial infection, consider observing off antibiotics or discontinuation of antibiotics and continue supportive care. If the respiratory panel is positive for atypical bacterial infection (Bordetella pertussis, Chlamydophila pneumoniae, or Mycoplasma pneumoniae), consider antibiotic de-escalation to target atypical bacterial infection.            Imaging Results (Last 24 Hours)       ** No results found for the last 24 hours. **            Results for orders placed during the hospital encounter of 10/26/23    Adult Transthoracic Echo Complete W/ Cont if Necessary Per Protocol    Interpretation Summary    There is a large mobile echodensity which appears to be attached to the right coronary leaflet of the aortic valve. Differential includes vegetation, thrombus, and fibroelastoma    Severe aortic valve regurgitation is present.    The left ventricular cavity is borderline dilated.    Left ventricular systolic function is mildly decreased. Left ventricular ejection fraction appears to be 46 - 50%.    Left ventricular wall thickness is consistent with moderate to severe concentric hypertrophy.    The myocardium is speckled and bright in appearance, consistent with infiltrative cardiomyopathy versus chronic kidney disease related changes.    There is grade III restrictive diastolic dysfunction (Valsalva not performed)    The left atrial cavity is moderately dilated.    There is moderate posteriorly directed mitral regurgitation    Mild to moderate tricuspid valve regurgitation is present.    Calculated right ventricular systolic pressure from tricuspid regurgitation is 45 mmHg.    The ascending aorta is  mildly enlarged at 3.8 cm. The main pulmonary artery is enlarged at 2.5 cm    There is a trivial pericardial effusion.      I have reviewed the medications:  Scheduled Meds:carvedilol, 25 mg, Oral, BID With Meals  Delflex-LC/2.5% Dextrose, 1.5 L, Intraperitoneal, 6 Exchanges Daily (Q4H)  docusate sodium, 100 mg, Oral, BID  hydroxychloroquine, 200 mg, Oral, Q24H  mycophenolate, 250 mg, Oral, Q12H  senna-docusate sodium, 2 tablet, Oral, BID  sevelamer, 1,600 mg, Oral, TID With Meals  sodium chloride, 10 mL, Intravenous, Q12H  sodium chloride, 1 g, Oral, BID With Meals      Continuous Infusions:   PRN Meds:.  acetaminophen **OR** acetaminophen **OR** acetaminophen    senna-docusate sodium **AND** polyethylene glycol **AND** bisacodyl **AND** bisacodyl    calcium carbonate    labetalol    nitroglycerin    ondansetron **OR** ondansetron    prochlorperazine **OR** prochlorperazine **OR** prochlorperazine    [COMPLETED] Insert Peripheral IV **AND** sodium chloride    sodium chloride    sodium chloride    Assessment & Plan   Assessment & Plan     Active Hospital Problems    Diagnosis  POA    **Hyponatremia [E87.1]  Yes    Aortic valve lesion [I35.8]  Yes    Severe aortic valve regurgitation [I35.1]  Yes    Poor appetite [R63.0]  Yes    Moderate malnutrition [E44.0]  Yes    Chronic diastolic CHF (congestive heart failure) [I50.32]  Yes    Anemia due to chronic kidney disease, on chronic dialysis [N18.6, D63.1, Z99.2]  Not Applicable    Peritoneal dialysis catheter in place [Z99.2]  Not Applicable    Essential hypertension [I10]  Yes    End stage renal disease on dialysis [N18.6, Z99.2]  Not Applicable    Seizure disorder [G40.909]  Yes    Elevated liver function tests [R79.89]  Yes    Pericardial effusion [I31.39]  Yes    Systemic lupus erythematosus [M32.9]  Yes    Thrombocytopenia [D69.6]  Yes    Hypertension secondary to other renal disorders [I15.1]  Yes    Vitamin D deficiency [E55.9]  Yes      Resolved Hospital  Problems    Diagnosis Date Resolved POA    Abdominal pain [R10.9] 10/28/2023 Yes        Brief Hospital Course to date:  Dee Herrera is a 33 y.o. female presents to the hospital with weakness and shortness of breath found to have severe hyponatremia.     Discussion/plan for today:  Case discussed with cardiology.  Plan is for CHETNA tomorrow.  I am making patient n.p.o. after midnight for procedure and discussed this with patient.  Blood culture from 10/27 reviewed today and is negative so far.  Sodium level is lower today.  Plan to discuss with nephrology later today regarding treatment plans.  Continue salt tablets for now.  Patient currently not having significant symptoms or confusion.  Case discussed with nephrology.  Sodium level is better.      Hyponatremia:  Trend sodium level.  Salt tablets.  Urea.  Nephrology adjusting peritoneal dialysis.  Monitor for any associated issues with metabolic problem.     Hypertensive urgency with essential hypertension:  Initially severely elevated blood pressure.  Possible noncompliance with home medications or dialysis.  Blood pressure improved.  Stop calcium channel blocker for now.  Blood pressure is quite labile.  Labetalol as needed for greater than 180.     End-stage renal disease on peritoneal dialysis:  Nephrology consult.  Dialyze as needed.  Electrolytes reviewed and patient has metabolic acidosis.     Chronic diastolic heart failure with history of pericardial effusion:  Continue home cardiac medications.  Consult cardiology, sees Dr. Taylor.  In the setting of shortness of breath and known pericardial effusion plan to recheck echocardiogram to reevaluate.     Malnutrition and poor appetite:  Zofran as needed for nausea.  Nutrition consult and maximize nutrition is much as needed.  Continue to encourage diet and oral intake.     Chronic abdominal pain: Possibly related to heart failure, electrolyte abnormalities, or pulmonary hypertension.  Chronic issue now  symptoms resolved.  Abdominal exam is benign.     Thrombocytopenia: Chronic issue.  No bleeding noted.     Lupus:, Immunosuppressed: Continue Plaquenil and CellCept.  Monitor carefully     Anemia of chronic kidney disease: Trend hemoglobin.     Seizure disorder: Denies any recent seizures for several years.  Monitor.        DVT Prophylaxis: Mechanical      Disposition: Likely home pending clinical course    CODE STATUS:   Code Status and Medical Interventions:   Ordered at: 10/26/23 0434     Code Status (Patient has no pulse and is not breathing):    CPR (Attempt to Resuscitate)     Medical Interventions (Patient has pulse or is breathing):    Full Support       Mike Lezama MD  10/29/23

## 2023-10-29 NOTE — PROGRESS NOTES
I was notified by nursing that patient had been refusing some of her salt tablets which may have contributed to her sodium dropped from 1 28-1 23 today.  Unfortunately  if sodium level is not adequate tomorrow this may delay her CHETNA because she will be unable to get anesthesia.  We have been counseled her at length about the importance of sticking to treatment plan as refusing recommended treatment can lead to worsening morbidity or mortality.  Hopefully patient will work with so we can help her with her acute issues and get a better understanding of echo abnormalities.  Electronically signed by Mike Lezama MD, 10/29/23,

## 2023-10-29 NOTE — PLAN OF CARE
Goal Outcome Evaluation:     Patient A&Ox4. Refusing most meds, - was able to convince patient to take sodium tablet - pt stated she would only take sodium tablet if it was dissolved in chicken broth, so pill was dissolved and patient drank broth - neph said this was fine. Educated on fluid restriction, however not very compliant as she states she needs drinks to take her meds (sodium tablet and urea). Complaints of severe bilateral foot pain, relief with tramadol. PD done per orders.

## 2023-10-29 NOTE — PROGRESS NOTES
[facility] Progress Note      Patient Name: Dee Herrera  : 1990  MRN: [id number]  Primary Care Physician:  Margarita Woods APRN  Date of admission: 10/26/2023    Subjective     Interval History:     Patient lying in bed comfortable  Feeling tired and fatigued  Discomfort in lower extremities  Undergoing PD , exchanges without any difficulty  Having regular bowel movements  Ultrafiltration per exchange waiting 200-500 mils on 1.5% dianeal  solution     Review of Systems:   As noted above    Objective     Vitals:   Temp:  [98 °F (36.7 °C)-99.7 °F (37.6 °C)] 98 °F (36.7 °C)  Heart Rate:  [64-73] 64  Resp:  [18-20] 20  BP: ()/(58-70) 109/60    Intake/Output Summary (Last 24 hours) at 10/29/2023 1224  Last data filed at 10/29/2023 0750  Gross per 24 hour   Intake 27572 ml   Output 64533 ml   Net -100 ml       Physical Exam:    General Appearance: alert, oriented x 3, no acute distress   Skin: warm and dry  HEENT: oral mucosa normal, nonicteric sclera  Neck: supple, no JVD  Lungs: Clear bilateral  Heart: RRR, normal S1 and S2  Abdomen: soft, nontender, nondistended  : no palpable bladder PD catheter in place with clean exit site  Extremities: no edema, cyanosis or clubbing  Neuro: normal speech and mental status     Scheduled Meds:     carvedilol, 25 mg, Oral, BID With Meals  Delflex-LC/2.5% Dextrose, 1.5 L, Intraperitoneal, 6 Exchanges Daily (Q4H)  docusate sodium, 100 mg, Oral, BID  hydroxychloroquine, 200 mg, Oral, Q24H  mycophenolate, 250 mg, Oral, Q12H  senna-docusate sodium, 2 tablet, Oral, BID  sevelamer, 1,600 mg, Oral, TID With Meals  sodium chloride, 10 mL, Intravenous, Q12H  sodium chloride, 1 g, Oral, BID With Meals  sterile water (preservative free) 276.9 mL with sodium chloride 92.4 mEq infusion, , Intravenous, Once  Urea, 15 g, Oral, BID      IV Meds:        Results Reviewed:   I have personally reviewed the results from the time of this admission to  10/29/2023 12:24 EDT     Results from last 7 days   Lab Units 10/29/23  0645 10/28/23  0551 10/27/23  0650 10/26/23  0314 10/26/23  0054   SODIUM mmol/L 123* 128* 121*   < > 122*   POTASSIUM mmol/L 4.8 3.1* 3.7   < > 3.8   CHLORIDE mmol/L 89* 89* 83*   < > 82*   CO2 mmol/L 21.7* 24.2 19.9*   < > 22.0   BUN mg/dL 34* 40* 50*   < > 47*   CREATININE mg/dL 8.04* 7.92* 10.02*   < > 9.75*   CALCIUM mg/dL 8.1* 8.4* 8.7   < > 9.9   BILIRUBIN mg/dL  --   --   --   --  0.8   ALK PHOS U/L  --   --   --   --  116   ALT (SGPT) U/L  --   --   --   --  71*   AST (SGOT) U/L  --   --   --   --  61*   GLUCOSE mg/dL 86 133* 134*   < > 101*    < > = values in this interval not displayed.       Estimated Creatinine Clearance: 8.4 mL/min (A) (by C-G formula based on SCr of 8.04 mg/dL (H)).    Results from last 7 days   Lab Units 10/29/23  0645 10/27/23  0650   PHOSPHORUS mg/dL 3.1 6.6*           Results from last 7 days   Lab Units 10/29/23  0645 10/28/23  0551 10/27/23  0650 10/26/23  0649 10/26/23  0054   WBC 10*3/mm3 4.10 5.02 3.48 3.93 4.49   HEMOGLOBIN g/dL 10.9* 10.9* 10.6* 10.9* 11.1*   PLATELETS 10*3/mm3 120* 102* 78* 104* 121*       Results from last 7 days   Lab Units 10/26/23  0054   INR  1.15*       Assessment / Plan     ASSESSMENT:    -End-stage renal disease on nocturnal peritoneal dialysis. ( Home prescription Dianeal solution 2.5 %  8 liters , 4 exchanges of 2 liter  + 0.5 liter day exchange ) .  Currently 1.5 % Dianeal Solution 1.5 liter q 6 hours , lytes and volume status stable    -Hyponatremia.  Uric acid 3.9,  Serum osmo 270 , unable to obtain urine samples for urine sodium and urine osmolarity.  Patient was given a trial of 500 mL of fluid bolus with worsening hyponatremia  adjusted PD prescription to improve volume status . Currently on urea , fluid restriction and salt tablets    -Hypertension secondary to end-stage renal disease.  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  We will follow closely.  No evidence of active bleeding   -Chronic diastolic congestive failure .echocardiogram with evidence of large mobile echodensity  and aortic valve.  Followed closely by cardiology.  PD prescription adjusted to improve volume status        PLAN:    Continue fluid restriction, urea and sodium chloride , Order 1.8 % NSS 50 ml x 6 hours    Patient was encouraged to continue to increase her protein intake and limit free water intake  We will continue current PD prescription volume status stable  Appreciate cardiology recommendation and advice    Thank you for involving us in the care of Dee Herrera.  Please feel free to call with any questions.    Hair Mckeon MD  10/29/23  12:24 EDT    [facility]  [phone]    Please note that portions of this note were completed with a voice recognition program.

## 2023-10-29 NOTE — PROGRESS NOTES
LOS: 3 days   Patient Care Team:  Margarita Woods APRN as PCP - General (Nurse Practitioner)  Winnie Sanchez MD as Referring Physician (Obstetrics and Gynecology)  Norberto Almaraz MD PhD as Consulting Physician (Hematology and Oncology)  Lupis Thomas MD as Consulting Physician (Nephrology)  Hair Mckeon MD as Consulting Physician (Nephrology)  Juana Taylor MD as Consulting Physician (Cardiology)    Chief Complaint:   Possible endocarditis    Interval History:     Hyponatremia worsened today    She did not have any questions about CHETNA    Objective   Vital Signs  Temp:  [98 °F (36.7 °C)-99.7 °F (37.6 °C)] 98 °F (36.7 °C)  Heart Rate:  [61-73] 61  Resp:  [16-20] 16  BP: (104-119)/(58-70) 119/68    Intake/Output Summary (Last 24 hours) at 10/29/2023 1505  Last data filed at 10/29/2023 0750  Gross per 24 hour   Intake 77695 ml   Output 70652 ml   Net -100 ml       Young female  Lying in bed  No distress  Quiet  Sleeping, but awaken easily  RRR, diastolic murmur    Results Review:      Results from last 7 days   Lab Units 10/29/23  0645 10/28/23  0551 10/27/23  0650   SODIUM mmol/L 123* 128* 121*   POTASSIUM mmol/L 4.8 3.1* 3.7   CHLORIDE mmol/L 89* 89* 83*   CO2 mmol/L 21.7* 24.2 19.9*   BUN mg/dL 34* 40* 50*   CREATININE mg/dL 8.04* 7.92* 10.02*   GLUCOSE mg/dL 86 133* 134*   CALCIUM mg/dL 8.1* 8.4* 8.7     Results from last 7 days   Lab Units 10/26/23  0649 10/26/23  0314 10/26/23  0054   HSTROP T ng/L 117* 123* 130*     Results from last 7 days   Lab Units 10/29/23  0645 10/28/23  0551 10/27/23  0650   WBC 10*3/mm3 4.10 5.02 3.48   HEMOGLOBIN g/dL 10.9* 10.9* 10.6*   HEMATOCRIT % 34.2 33.4* 32.8*   PLATELETS 10*3/mm3 120* 102* 78*     Results from last 7 days   Lab Units 10/26/23  0054   INR  1.15*   APTT seconds 29.0                   I reviewed the patient's new clinical results.  I personally viewed and interpreted the patient's EKG/Telemetry data        Medication Review:   carvedilol,  25 mg, Oral, BID With Meals  Delflex-LC/2.5% Dextrose, 1.5 L, Intraperitoneal, 6 Exchanges Daily (Q4H)  docusate sodium, 100 mg, Oral, BID  hydroxychloroquine, 200 mg, Oral, Q24H  mycophenolate, 250 mg, Oral, Q12H  senna-docusate sodium, 2 tablet, Oral, BID  sevelamer, 1,600 mg, Oral, TID With Meals  sodium chloride, 10 mL, Intravenous, Q12H  sodium chloride, 1 g, Oral, BID With Meals  sterile water (preservative free) 276.9 mL with sodium chloride 92.4 mEq infusion, , Intravenous, Once  Urea, 15 g, Oral, BID             Assessment & Plan     1.  Shortness of breath.  Improved this morning following dialysis.  2.  Possible cardiomyopathy.  EF previously normal here but reportedly 40 to 45% on echocardiogram at [facility] in 9/2023.    3.  Valvular heart disease-aortic insufficiency with mobile density on aortic valve/right coronary cusp.  Needs CHETNA Monday.  Blood cultures were sent 10/27/2023  4.  Elevated troponin.  Due to poor renal clearance.  Had normal coronary arteries in 6/2023.  5.  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  6.  History of pericardial effusion.  Noted on echocardiogram earlier this year.  No mention of this on recent echocardiogram.  7.  Hypovolemic acute hyponatremia.  Lab work today pending.  8.  Hypertension.  Improved and actually on the lower end this morning.  9.  Chronic anemia  10.  Systemic lupus erythematosus    CHETNA tomorrow as long as able to be sedated    Blood Cultures remain negative      Drew Keen MD  10/29/23  15:05 EDT

## 2023-10-30 ENCOUNTER — ANESTHESIA (OUTPATIENT)
Dept: POSTOP/PACU | Facility: HOSPITAL | Age: 33
End: 2023-10-30
Payer: MEDICARE

## 2023-10-30 ENCOUNTER — APPOINTMENT (OUTPATIENT)
Dept: CARDIOLOGY | Facility: HOSPITAL | Age: 33
DRG: 219 | End: 2023-10-30
Payer: MEDICARE

## 2023-10-30 ENCOUNTER — APPOINTMENT (OUTPATIENT)
Dept: CT IMAGING | Facility: HOSPITAL | Age: 33
DRG: 219 | End: 2023-10-30
Payer: MEDICARE

## 2023-10-30 ENCOUNTER — ANESTHESIA EVENT (OUTPATIENT)
Dept: POSTOP/PACU | Facility: HOSPITAL | Age: 33
End: 2023-10-30
Payer: MEDICARE

## 2023-10-30 ENCOUNTER — ANESTHESIA (OUTPATIENT)
Dept: TELEMETRY | Facility: HOSPITAL | Age: 33
End: 2023-10-30

## 2023-10-30 ENCOUNTER — ANESTHESIA EVENT (OUTPATIENT)
Dept: TELEMETRY | Facility: HOSPITAL | Age: 33
End: 2023-10-30

## 2023-10-30 ENCOUNTER — APPOINTMENT (OUTPATIENT)
Dept: POSTOP/PACU | Facility: HOSPITAL | Age: 33
DRG: 219 | End: 2023-10-30
Payer: MEDICARE

## 2023-10-30 VITALS — SYSTOLIC BLOOD PRESSURE: 130 MMHG | HEART RATE: 71 BPM | DIASTOLIC BLOOD PRESSURE: 73 MMHG | OXYGEN SATURATION: 100 %

## 2023-10-30 PROBLEM — I71.21 ANEURYSM OF ASCENDING AORTA WITHOUT RUPTURE: Status: ACTIVE | Noted: 2023-10-30

## 2023-10-30 PROBLEM — I71.010 DISSECTING ASCENDING AORTIC ANEURYSM: Status: ACTIVE | Noted: 2023-10-30

## 2023-10-30 LAB
ALBUMIN SERPL-MCNC: 2.5 G/DL (ref 3.5–5.2)
ANION GAP SERPL CALCULATED.3IONS-SCNC: 11 MMOL/L (ref 5–15)
BH CV ECHO MEAS - LVOT AREA: 4.5 CM2
BH CV ECHO MEAS - LVOT DIAM: 2.39 CM
BH CV ECHO SHUNT ASSESSMENT PERFORMED (HIDDEN SCRIPTING): 1
BUN SERPL-MCNC: 56 MG/DL (ref 6–20)
BUN/CREAT SERPL: 7.8 (ref 7–25)
CALCIUM SPEC-SCNC: 8.5 MG/DL (ref 8.6–10.5)
CHLORIDE SERPL-SCNC: 94 MMOL/L (ref 98–107)
CO2 SERPL-SCNC: 23 MMOL/L (ref 22–29)
CREAT SERPL-MCNC: 7.22 MG/DL (ref 0.57–1)
DEPRECATED RDW RBC AUTO: 51.3 FL (ref 37–54)
EGFRCR SERPLBLD CKD-EPI 2021: 7.2 ML/MIN/1.73
ERYTHROCYTE [DISTWIDTH] IN BLOOD BY AUTOMATED COUNT: 17.8 % (ref 12.3–15.4)
GLUCOSE SERPL-MCNC: 120 MG/DL (ref 65–99)
HBA1C MFR BLD: 5.1 % (ref 4.8–5.6)
HCT VFR BLD AUTO: 33.4 % (ref 34–46.6)
HGB BLD-MCNC: 10.8 G/DL (ref 12–15.9)
MAGNESIUM SERPL-MCNC: 1.5 MG/DL (ref 1.6–2.6)
MCH RBC QN AUTO: 26.7 PG (ref 26.6–33)
MCHC RBC AUTO-ENTMCNC: 32.3 G/DL (ref 31.5–35.7)
MCV RBC AUTO: 82.7 FL (ref 79–97)
NT-PROBNP SERPL-MCNC: ABNORMAL PG/ML (ref 0–450)
PHOSPHATE SERPL-MCNC: 3.2 MG/DL (ref 2.5–4.5)
PLATELET # BLD AUTO: 93 10*3/MM3 (ref 140–450)
PMV BLD AUTO: ABNORMAL FL
POTASSIUM SERPL-SCNC: 4 MMOL/L (ref 3.5–5.2)
RBC # BLD AUTO: 4.04 10*6/MM3 (ref 3.77–5.28)
SINUS: 4.4 CM
SODIUM SERPL-SCNC: 128 MMOL/L (ref 136–145)
WBC NRBC COR # BLD: 3.57 10*3/MM3 (ref 3.4–10.8)

## 2023-10-30 PROCEDURE — 93325 DOPPLER ECHO COLOR FLOW MAPG: CPT

## 2023-10-30 PROCEDURE — 93321 DOPPLER ECHO F-UP/LMTD STD: CPT | Performed by: INTERNAL MEDICINE

## 2023-10-30 PROCEDURE — 63710000001 MYCOPHENOLATE MOFETIL PER 250 MG: Performed by: INTERNAL MEDICINE

## 2023-10-30 PROCEDURE — 99232 SBSQ HOSP IP/OBS MODERATE 35: CPT | Performed by: INTERNAL MEDICINE

## 2023-10-30 PROCEDURE — 80069 RENAL FUNCTION PANEL: CPT | Performed by: INTERNAL MEDICINE

## 2023-10-30 PROCEDURE — 83880 ASSAY OF NATRIURETIC PEPTIDE: CPT | Performed by: NURSE PRACTITIONER

## 2023-10-30 PROCEDURE — 83036 HEMOGLOBIN GLYCOSYLATED A1C: CPT | Performed by: NURSE PRACTITIONER

## 2023-10-30 PROCEDURE — 25510000001 IOPAMIDOL PER 1 ML: Performed by: INTERNAL MEDICINE

## 2023-10-30 PROCEDURE — 93312 ECHO TRANSESOPHAGEAL: CPT

## 2023-10-30 PROCEDURE — 93320 DOPPLER ECHO COMPLETE: CPT

## 2023-10-30 PROCEDURE — 76376 3D RENDER W/INTRP POSTPROCES: CPT

## 2023-10-30 PROCEDURE — 93325 DOPPLER ECHO COLOR FLOW MAPG: CPT | Performed by: INTERNAL MEDICINE

## 2023-10-30 PROCEDURE — 99222 1ST HOSP IP/OBS MODERATE 55: CPT | Performed by: THORACIC SURGERY (CARDIOTHORACIC VASCULAR SURGERY)

## 2023-10-30 PROCEDURE — 71275 CT ANGIOGRAPHY CHEST: CPT

## 2023-10-30 PROCEDURE — 25810000003 LACTATED RINGERS PER 1000 ML: Performed by: ANESTHESIOLOGY

## 2023-10-30 PROCEDURE — 85027 COMPLETE CBC AUTOMATED: CPT | Performed by: INTERNAL MEDICINE

## 2023-10-30 PROCEDURE — 25010000002 PROPOFOL 10 MG/ML EMULSION: Performed by: ANESTHESIOLOGY

## 2023-10-30 PROCEDURE — 83735 ASSAY OF MAGNESIUM: CPT | Performed by: NURSE PRACTITIONER

## 2023-10-30 PROCEDURE — 76376 3D RENDER W/INTRP POSTPROCES: CPT | Performed by: INTERNAL MEDICINE

## 2023-10-30 PROCEDURE — 93321 DOPPLER ECHO F-UP/LMTD STD: CPT

## 2023-10-30 PROCEDURE — 93312 ECHO TRANSESOPHAGEAL: CPT | Performed by: INTERNAL MEDICINE

## 2023-10-30 RX ORDER — PROPOFOL 10 MG/ML
VIAL (ML) INTRAVENOUS AS NEEDED
Status: DISCONTINUED | OUTPATIENT
Start: 2023-10-30 | End: 2023-10-30 | Stop reason: SURG

## 2023-10-30 RX ORDER — SODIUM CHLORIDE, SODIUM LACTATE, POTASSIUM CHLORIDE, CALCIUM CHLORIDE 600; 310; 30; 20 MG/100ML; MG/100ML; MG/100ML; MG/100ML
INJECTION, SOLUTION INTRAVENOUS CONTINUOUS PRN
Status: DISCONTINUED | OUTPATIENT
Start: 2023-10-30 | End: 2023-10-30 | Stop reason: SURG

## 2023-10-30 RX ADMIN — TRAMADOL HYDROCHLORIDE 50 MG: 50 TABLET ORAL at 23:23

## 2023-10-30 RX ADMIN — DEXTROSE MONOHYDRATE, SODIUM CHLORIDE, SODIUM LACTATE, CALCIUM CHLORIDE, MAGNESIUM CHLORIDE 1500 ML: 2.5; 538; 448; 18.4; 5.08 SOLUTION INTRAPERITONEAL at 19:44

## 2023-10-30 RX ADMIN — DEXTROSE MONOHYDRATE, SODIUM CHLORIDE, SODIUM LACTATE, CALCIUM CHLORIDE, MAGNESIUM CHLORIDE 1500 ML: 2.5; 538; 448; 18.4; 5.08 SOLUTION INTRAPERITONEAL at 16:58

## 2023-10-30 RX ADMIN — DEXTROSE MONOHYDRATE, SODIUM CHLORIDE, SODIUM LACTATE, CALCIUM CHLORIDE, MAGNESIUM CHLORIDE 1500 ML: 2.5; 538; 448; 18.4; 5.08 SOLUTION INTRAPERITONEAL at 04:12

## 2023-10-30 RX ADMIN — PROPOFOL 100 MG: 10 INJECTION, EMULSION INTRAVENOUS at 08:11

## 2023-10-30 RX ADMIN — HYDROXYCHLOROQUINE SULFATE 200 MG: 200 TABLET ORAL at 11:22

## 2023-10-30 RX ADMIN — Medication 10 ML: at 20:25

## 2023-10-30 RX ADMIN — MYCOPHENOLATE MOFETIL 250 MG: 500 TABLET ORAL at 00:03

## 2023-10-30 RX ADMIN — TRAMADOL HYDROCHLORIDE 50 MG: 50 TABLET ORAL at 06:28

## 2023-10-30 RX ADMIN — TOPICAL ANESTHETIC 1 SPRAY: 200 SPRAY DENTAL; PERIODONTAL at 08:06

## 2023-10-30 RX ADMIN — DEXTROSE MONOHYDRATE, SODIUM CHLORIDE, SODIUM LACTATE, CALCIUM CHLORIDE, MAGNESIUM CHLORIDE 1500 ML: 2.5; 538; 448; 18.4; 5.08 SOLUTION INTRAPERITONEAL at 12:12

## 2023-10-30 RX ADMIN — PROPOFOL 200 MCG/KG/MIN: 10 INJECTION, EMULSION INTRAVENOUS at 08:13

## 2023-10-30 RX ADMIN — SEVELAMER CARBONATE 1600 MG: 800 TABLET, FILM COATED ORAL at 11:21

## 2023-10-30 RX ADMIN — CARVEDILOL 25 MG: 25 TABLET, FILM COATED ORAL at 11:22

## 2023-10-30 RX ADMIN — MYCOPHENOLATE MOFETIL 250 MG: 500 TABLET ORAL at 11:22

## 2023-10-30 RX ADMIN — Medication 10 ML: at 11:23

## 2023-10-30 RX ADMIN — IOPAMIDOL 95 ML: 755 INJECTION, SOLUTION INTRAVENOUS at 09:28

## 2023-10-30 RX ADMIN — SODIUM CHLORIDE, POTASSIUM CHLORIDE, SODIUM LACTATE AND CALCIUM CHLORIDE: 600; 310; 30; 20 INJECTION, SOLUTION INTRAVENOUS at 08:01

## 2023-10-30 RX ADMIN — CARVEDILOL 25 MG: 25 TABLET, FILM COATED ORAL at 17:07

## 2023-10-30 NOTE — ANESTHESIA POSTPROCEDURE EVALUATION
"Patient: Dee Herrera    Procedure Summary       Date: 10/30/23 Room / Location: [facility] PACU    Anesthesia Start: 0801 Anesthesia Stop: 0838    Procedure: ADULT TRANSESOPHAGEAL ECHO 3D (CHETNA) W/ CONT IF NECESSARY PER PROTOCOL Diagnosis:       (Other Reasons)      (Additional Reasons)      (Aortic Disease)    Scheduled Providers: Crystal Torres MD Provider: Connor Snow MD    Anesthesia Type: MAC ASA Status: 4            Anesthesia Type: MAC    Vitals  Vitals Value Taken Time   /87 10/30/23 0909   Temp     Pulse 74 10/30/23 0908   Resp 16 10/30/23 0905   SpO2 100 % 10/30/23 0908   Vitals shown include unfiled device data.        Post Anesthesia Care and Evaluation    Patient location during evaluation: bedside  Patient participation: complete - patient participated  Level of consciousness: sleepy but conscious  Pain score: 0  Pain management: adequate    Airway patency: patent  Anesthetic complications: No anesthetic complications    Cardiovascular status: acceptable  Respiratory status: acceptable  Hydration status: acceptable    Comments: /82 (BP Location: Left arm, Patient Position: Lying)   Pulse 73   Temp 36.7 °C (98.1 °F) (Oral)   Resp 16   Ht 165.1 cm (65\")   Wt 52.2 kg (115 lb 1.3 oz)   LMP 08/10/2022 (Approximate)   SpO2 99%   BMI 19.15 kg/m²       "

## 2023-10-30 NOTE — PLAN OF CARE
Goal Outcome Evaluation:      Pt is alert and oriented. On RA. Pt received PD x 3. Pt tolerated PD well. Pt c/o of pain and was treated for it, see MAR. Pt picked scab on nose, band aid placed. Pt to receive CHETNA today. Pt VSS. NAD Plan of care ongoing.

## 2023-10-30 NOTE — PROGRESS NOTES
Reviewed CHETNA and CT angiogram findings with Dr. Torres which showed aortic root/ascending aortic aneurysm and dissection with resulting aortic valve regurgitation due to aortic annular dilatation.  Discussed the findings with the patient.  Called her grandmother Airam Herrera but no answer.  Will try to call again.    Recommended CT surgery consultation.  Dr. Medina to see the patient today.

## 2023-10-30 NOTE — PLAN OF CARE
Goal Outcome Evaluation:                      Patient alert and oriented. No c/o pain. PD completed per MAR. CHETNA completed this am- patient spoke with several doctors today about having surgery to repair heart. Family to meet at hospital tomorrow with patient. No acute distress noted, will continue to monitor.

## 2023-10-30 NOTE — PROGRESS NOTES
LOS: 4 days   Patient Care Team:  Margarita Woods APRN as PCP - General (Nurse Practitioner)  Winnie Sanchez MD as Referring Physician (Obstetrics and Gynecology)  Norberto Almaraz MD PhD as Consulting Physician (Hematology and Oncology)  Lupis Thomas MD as Consulting Physician (Nephrology)  Hair Mckeon MD as Consulting Physician (Nephrology)  Juana Taylor MD as Consulting Physician (Cardiology)    Chief Complaint:     Aortic insufficiency    Interval History:     She denies chest pain, dyspnea.  She does feel weak.  She has no nausea or difficulty breathing.       Objective   Vital Signs  Temp:  [97.5 °F (36.4 °C)-98 °F (36.7 °C)] 97.9 °F (36.6 °C)  Heart Rate:  [61-73] 73  Resp:  [16-20] 16  BP: (109-142)/(60-83) 136/83    Intake/Output Summary (Last 24 hours) at 10/30/2023 0715  Last data filed at 10/30/2023 0439  Gross per 24 hour   Intake 62767 ml   Output 65886 ml   Net 3520 ml       Comfortable NAD  Neck supple, no JVD or thyromegaly appreciated  S1/S2 RRR, no r/g, 2/4 diastolic   Lungs CTA B, normal effort  Abdomen S/NT/ND (+) BS, no HSM appreciated  Extremities warm, no clubbing, cyanosis, or edema  No visible or palpable skin lesions  A/Ox4, mood and affect appropriate    Results Review:      Results from last 7 days   Lab Units 10/30/23  0540 10/29/23  0645 10/28/23  0551   SODIUM mmol/L 128* 123* 128*   POTASSIUM mmol/L 4.0 4.8 3.1*   CHLORIDE mmol/L 94* 89* 89*   CO2 mmol/L 23.0 21.7* 24.2   BUN mg/dL 56* 34* 40*   CREATININE mg/dL 7.22* 8.04* 7.92*   GLUCOSE mg/dL 120* 86 133*   CALCIUM mg/dL 8.5* 8.1* 8.4*     Results from last 7 days   Lab Units 10/26/23  0649 10/26/23  0314 10/26/23  0054   HSTROP T ng/L 117* 123* 130*     Results from last 7 days   Lab Units 10/30/23  0540 10/29/23  0645 10/28/23  0551   WBC 10*3/mm3 3.57 4.10 5.02   HEMOGLOBIN g/dL 10.8* 10.9* 10.9*   HEMATOCRIT % 33.4* 34.2 33.4*   PLATELETS 10*3/mm3 93* 120* 102*     Results from last 7 days   Lab  Units 10/26/23  0054   INR  1.15*   APTT seconds 29.0                   I reviewed the patient's new clinical results.  I personally viewed and interpreted the patient's EKG/Telemetry data        Medication Review:   carvedilol, 25 mg, Oral, BID With Meals  Delflex-LC/2.5% Dextrose, 1.5 L, Intraperitoneal, 6 Exchanges Daily (Q4H)  docusate sodium, 100 mg, Oral, BID  hydroxychloroquine, 200 mg, Oral, Q24H  mycophenolate, 250 mg, Oral, Q12H  senna-docusate sodium, 2 tablet, Oral, BID  sevelamer, 1,600 mg, Oral, TID With Meals  sodium chloride, 10 mL, Intravenous, Q12H  sodium chloride, 1 g, Oral, BID With Meals  Urea, 15 g, Oral, BID             Assessment & Plan       Hyponatremia    Vitamin D deficiency    Thrombocytopenia    Hypertension secondary to other renal disorders    Systemic lupus erythematosus    Pericardial effusion    End stage renal disease on dialysis    Seizure disorder    Elevated liver function tests    Essential hypertension    Peritoneal dialysis catheter in place    Anemia due to chronic kidney disease, on chronic dialysis    Poor appetite    Moderate malnutrition    Chronic diastolic CHF (congestive heart failure)    Aortic valve lesion    Severe aortic valve regurgitation    1.  Shortness of breath.  Improved this morning following dialysis.  2.  Possible cardiomyopathy.  EF previously normal here but reportedly 40 to 45% on echocardiogram at [facility] in 9/2023.    3.  Valvular heart disease- severe aortic insufficiency with mobile density on aortic valve/right coronary cusp.   Blood cultures were sent 10/27/2023 - negative x2 days.   4.  Elevated troponin.  Due to poor renal clearance.  Had normal coronary arteries in 6/2023.  5.  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  6.  History of pericardial effusion.  Noted on echocardiogram earlier this year.  No mention of this on recent echocardiogram.  7.  Hypovolemic acute hyponatremia.  Lab work today pending.  8.   Hypertension.  Improved and actually on the lower end this morning.  9.  Chronic anemia  10.  Systemic lupus erythematosus      CHETNA today. CHETNA suggests aortic dissection- stat CTA chest.     Crystal Torres MD  10/30/23  07:15 EDT

## 2023-10-30 NOTE — CONSULTS
Patient Care Team:  Margarita Woods APRN as PCP - General (Nurse Practitioner)  Winnie Sanchez MD as Referring Physician (Obstetrics and Gynecology)  Norberto Almaraz MD PhD as Consulting Physician (Hematology and Oncology)  Lupis Thomas MD as Consulting Physician (Nephrology)  Hair Mckeon MD as Consulting Physician (Nephrology)  Juana Taylor MD as Consulting Physician (Cardiology)    Chief complaint: Aortic insufficiency    Subjective     History of Present Illness  Patient is a 33 y.o. female with a past medical history including ESRD on PD, seizure disorder, Lupus, hypertension, chronic anemia, and migraines. She is a former smoker and reports occasional marijuana use. She denies tobacco use. She has been undergoing evaluation for possible transplant. She underwent left/right heart catheterization on 6/14/23 which showed normal coronary arteries. Echocardiogram from 9/11/23 at [facility] reported reduced EF at 40-45%, moderate to severe MR, and moderate to severe aortic insufficiency. She presented to the ER on 10/26 with complaints of shortness of breath that had been progressive in nature over the previous week. Upon arrival to the ER, her CXR was consistent with pulmonary edema and BNP was >70,000. She was admitted for further work up. Repeat transthoracic echocardiogram on 10/27/23 which showed mild to moderate MR and severe aortic valve regurgitation with large mobile echodensity on the right coronary leaflet. She underwent CHETNA this morning which showed aortic root/ascending aneurysm with dissection resulting in aortic regurgitation. CTA of the chest showed ascending aortic aneurysm/localized dissection. Dr. Medina has been consulted for cardiac surgery evaluation    Review of Systems   Constitutional:  Positive for activity change and fatigue.   HENT:  Negative for congestion, hearing loss and trouble swallowing.    Respiratory:  Positive for chest tightness and shortness of  breath.    Cardiovascular:  Negative for chest pain, palpitations and leg swelling.   Gastrointestinal:  Negative for diarrhea, nausea and vomiting.   Endocrine: Negative for polydipsia, polyphagia and polyuria.   Musculoskeletal:  Negative for gait problem, myalgias and neck pain.   Skin:  Negative for rash and wound.   Neurological:  Positive for seizures. Negative for syncope and facial asymmetry.   Psychiatric/Behavioral:  Negative for behavioral problems. The patient is nervous/anxious.         Past Medical History:   Diagnosis Date    Anasarca     PER CT SCAN    Dry skin     ESRD (end stage renal disease) on dialysis     TUES, THURS, SAT UMAIR CHAVIRA HWDRAKE    History of abdominal pain     History of anemia     History of transfusion     Hypertension     Iron deficiency anemia 09/27/2021    Lupus (systemic lupus erythematosus) 07/30/2022    Migraine     Other specified nutritional anemias     Pericardial effusion     Renal insufficiency     Seizures     STATES LAST WAS 1/2023    Shortness of breath     OCCASIONAL    Vitamin D deficiency 09/27/2021     Past Surgical History:   Procedure Laterality Date    CARDIAC CATHETERIZATION N/A 06/14/2023    Procedure: Coronary angiography;  Surgeon: Juana Taylor MD;  Location: [facility] CATH INVASIVE LOCATION;  Service: Cardiovascular;  Laterality: N/A;    CARDIAC CATHETERIZATION N/A 06/14/2023    Procedure: Left heart cath;  Surgeon: Juana Taylor MD;  Location: [facility] CATH INVASIVE LOCATION;  Service: Cardiovascular;  Laterality: N/A;    CARDIAC CATHETERIZATION N/A 06/14/2023    Procedure: Right Heart Cath;  Surgeon: Juana Taylor MD;  Location: [facility] CATH INVASIVE LOCATION;  Service: Cardiovascular;  Laterality: N/A;    COLONOSCOPY N/A 7/20/2023    Procedure: COLONOSCOPY to cecum with biopsy;  Surgeon: Drew Kaminski MD;  Location: [facility] ENDOSCOPY;  Service: Gastroenterology;  Laterality: N/A;  PRE - diarrhea, constipation  POST - fair prep, normal    ENDOSCOPY  N/A 7/20/2023    Procedure: ESOPHAGOGASTRODUODENOSCOPY with biopsy;  Surgeon: Drew Kaminski MD;  Location: [facility] ENDOSCOPY;  Service: Gastroenterology;  Laterality: N/A;  PRE - abn ct abd  POST - gastritis    INSERTION HEMODIALYSIS CATHETER N/A 07/26/2022    Procedure: RIGHT TUNNELED DIALYSIS CATHETER PLACEMENT;  Surgeon: Diandra Adhikari MD;  Location: [facility] MAIN OR;  Service: Vascular;  Laterality: N/A;    INSERTION PERITONEAL DIALYSIS CATHETER N/A 04/03/2023    Procedure: INSERTION PERITONEAL DIALYSIS CATHETER LAPAROSCOPIC, omentumpexy;  Surgeon: Jemal Loyola MD;  Location: [facility] MAIN OR;  Service: General;  Laterality: N/A;    TONSILLECTOMY       Family History   Problem Relation Age of Onset    Autoimmune disease Mother     Anemia Mother     Diabetes Sister     Anemia Brother     Diabetes Maternal Grandmother     Hypertension Maternal Grandmother     Cancer Maternal Grandmother     Sickle cell anemia Cousin     Malig Hyperthermia Neg Hx      Social History     Tobacco Use    Smoking status: Former     Types: Cigars     Passive exposure: Past    Smokeless tobacco: Never    Tobacco comments:     Patient smoked black & mild   Vaping Use    Vaping Use: Never used   Substance Use Topics    Alcohol use: Yes     Comment: social    Drug use: Yes     Types: Marijuana     Comment: OCCASIONAL     Medications Prior to Admission   Medication Sig Dispense Refill Last Dose    carvedilol (COREG) 25 MG tablet Take 1 tablet by mouth Every 12 (Twelve) Hours. 60 tablet 0 10/25/2023    famotidine (PEPCID) 20 MG tablet Take 1 tablet by mouth Daily. 30 tablet 0 10/25/2023    hydroxychloroquine (PLAQUENIL) 200 MG tablet Take 1 tablet by mouth Daily.   10/25/2023    mycophenolate (CELLCEPT) 500 MG tablet Take 0.5 tablets by mouth Every 12 (Twelve) Hours. 30 tablet 0 10/25/2023    ondansetron (Zofran) 4 MG tablet Take 1 tablet by mouth Every 8 (Eight) Hours As Needed for Nausea or Vomiting. 30 tablet 1 10/25/2023     "predniSONE (DELTASONE) 10 MG tablet Take 1 tablet by mouth Daily. 30 tablet 0 10/25/2023    sevelamer (RENVELA) 800 MG tablet Take 1 tablet by mouth 3 (Three) Times a Day With Meals.   10/25/2023    NIFEdipine XL (PROCARDIA XL) 90 MG 24 hr tablet Take 1 tablet by mouth Daily. (Patient not taking: Reported on 10/26/2023) 30 tablet 0 Not Taking    polyethylene glycol (MIRALAX) 17 GM/SCOOP powder Take 17 g by mouth As Needed. (Patient not taking: Reported on 10/26/2023)   Not Taking    potassium chloride 10 MEQ CR tablet Take 1 tablet by mouth Daily.   More than a month    sennosides-docusate (PERICOLACE) 8.6-50 MG per tablet Take 2 tablets by mouth Daily As Needed for Constipation. (Patient not taking: Reported on 10/26/2023) 30 tablet 1 Not Taking    simethicone (MYLICON) 80 MG chewable tablet Chew 1 tablet Every 6 (Six) Hours As Needed. (Patient not taking: Reported on 10/26/2023)   Not Taking     carvedilol, 25 mg, Oral, BID With Meals  Delflex-LC/2.5% Dextrose, 1.5 L, Intraperitoneal, 6 Exchanges Daily (Q4H)  docusate sodium, 100 mg, Oral, BID  hydroxychloroquine, 200 mg, Oral, Q24H  mycophenolate, 250 mg, Oral, Q12H  senna-docusate sodium, 2 tablet, Oral, BID  sevelamer, 1,600 mg, Oral, TID With Meals  sodium chloride, 10 mL, Intravenous, Q12H      Allergies:  Minoxidil    Objective      Vital Signs  Temp:  [97.5 °F (36.4 °C)-98.2 °F (36.8 °C)] 98.1 °F (36.7 °C)  Heart Rate:  [61-78] 73  Resp:  [16-24] 16  BP: (119-154)/(66-94) 152/82    Flowsheet Rows      Flowsheet Row First Filed Value   Admission Height 165.1 cm (65\") Documented at 10/26/2023 0012   Admission Weight 52.2 kg (115 lb) Documented at 10/26/2023 0012          165.1 cm (65\")    Physical Exam  Vitals and nursing note reviewed.   Constitutional:       General: She is not in acute distress.     Appearance: Normal appearance. She is not ill-appearing or toxic-appearing.   HENT:      Head: Normocephalic and atraumatic.      Nose: Nose normal. No " congestion or rhinorrhea.      Mouth/Throat:      Mouth: Mucous membranes are moist.      Pharynx: Oropharynx is clear.   Cardiovascular:      Rate and Rhythm: Normal rate and regular rhythm.      Heart sounds: Murmur heard.   Pulmonary:      Effort: Pulmonary effort is normal.      Breath sounds: Rales present.   Abdominal:      General: Bowel sounds are normal. There is no distension.      Palpations: Abdomen is soft. There is no mass.      Tenderness: There is no abdominal tenderness.      Comments: PD catheter   Musculoskeletal:      Cervical back: Normal range of motion and neck supple.      Right lower leg: No edema.      Left lower leg: No edema.   Skin:     General: Skin is warm and dry.      Capillary Refill: Capillary refill takes less than 2 seconds.      Coloration: Skin is not pale.      Findings: No bruising, erythema, lesion or rash.   Neurological:      General: No focal deficit present.      Mental Status: She is alert and oriented to person, place, and time. Mental status is at baseline.      Gait: Gait normal.   Psychiatric:         Mood and Affect: Mood normal.         Behavior: Behavior normal.         Thought Content: Thought content normal.         Judgment: Judgment normal.         Results Review:   Lab Results (last 24 hours)       Procedure Component Value Units Date/Time    Renal Function Panel [[id number]]  (Abnormal) Collected: 10/30/23 0540    Specimen: Blood Updated: 10/30/23 0627     Glucose 120 mg/dL      BUN 56 mg/dL      Creatinine 7.22 mg/dL      Sodium 128 mmol/L      Potassium 4.0 mmol/L      Chloride 94 mmol/L      CO2 23.0 mmol/L      Calcium 8.5 mg/dL      Albumin 2.5 g/dL      Phosphorus 3.2 mg/dL      Anion Gap 11.0 mmol/L      BUN/Creatinine Ratio 7.8     eGFR 7.2 mL/min/1.73      Comment: <15 Indicative of kidney failure       Narrative:      GFR Normal >60  Chronic Kidney Disease <60  Kidney Failure <15      CBC (No Diff) [[id number]]  (Abnormal) Collected: 10/30/23 0540     Specimen: Blood Updated: 10/30/23 0626     WBC 3.57 10*3/mm3      RBC 4.04 10*6/mm3      Hemoglobin 10.8 g/dL      Hematocrit 33.4 %      MCV 82.7 fL      MCH 26.7 pg      MCHC 32.3 g/dL      RDW 17.8 %      RDW-SD 51.3 fl      MPV --     Comment: Unable to calculate        Platelets 93 10*3/mm3     Blood Culture - Blood, Arm, Left [[id number]]  (Normal) Collected: 10/27/23 1652    Specimen: Blood from Arm, Left Updated: 10/29/23 1716     Blood Culture No growth at 2 days    Blood Culture - Blood, Arm, Right [[id number]]  (Normal) Collected: 10/27/23 1521    Specimen: Blood from Arm, Right Updated: 10/29/23 1601     Blood Culture No growth at 2 days            Assessment & Plan  - ascending aortic aneurysm with dissection  - severe aortic valve insufficiency  - ESRD on PD  - Lupus--on Cellcept/plaquenil  - hx of seizures  - hypertension  - chronic anemia    Dr. Medina has reviewed the studies and has recommended aortic valve/ascending aortic surgery. The patient felt overwhelmed by the situation, and would like to think about surgery. We will follow up with her to discuss further tomorrow. Dr. Medina to update her cardiologist    Thank you for allowing us to participate in the care of this patient.      CAESAR Hernandez  10/30/23  10:30 EDT    Addendum  Patient was seen and examined by me, agree with the findings above.  She is an unfortunate 33-year-old female with an aggressive form of lupus and end-stage renal disease.  She is being evaluated for a kidney transplant.  He has had symptoms of dyspnea and she was evaluated in the recent past with an echocardiogram at the [facility] that shows severe aortic insufficiency and mitral insufficiency.  She had a repeat study during this admission that shows severe aortic insufficiency with dissection of the aortic root and most likely is subacute if not chronic.  There is no involvement of the aortic arch by the dissection.  She is a type I DeBakey dissection.   She has depressed LV function.  She had a cardiac cath that showed no significant coronary artery disease and that was performed few weeks ago.  I discussed with the patient and her mother my recommendation of surgery in the form of aortic valve implantation procedure versus proximal root replacement.  Given the fact that she may be a candidate for kidney transplantation I think it is important to avoid a mechanical valve implant but depending on the findings it may be the only option.  I offered the patient surgery in the same admission however she wishes to take more time to consider surgery.  She is on Plaquenil and that may need to be discontinued for 5-7 days before surgery.  We will follow the patient with you and complete the recommendation regarding the timing of surgery  Chris Medina MD

## 2023-10-30 NOTE — CASE MANAGEMENT/SOCIAL WORK
Continued Stay Note  [facility]     Patient Name: Dee Herrera  MRN: [id number]  Today's Date: 10/30/2023    Admit Date: 10/26/2023    Plan: Home with mother, continue PD with [facility].   Discharge Plan       Row Name 10/30/23 1411       Plan    Plan Home with mother, continue PD with [facility].    Plan Comments CCP followed up with patient regarding discharge planning and she states plan is to return home with her mother at discharge. She states she plans to continue her PD treatment at [facility]. Mother to transport at discharge. CCP to follow. CD, CSW.                   Discharge Codes    No documentation.                 Expected Discharge Date and Time       Expected Discharge Date Expected Discharge Time    Oct 28, 2023

## 2023-10-30 NOTE — PROGRESS NOTES
[facility] Progress Note      Patient Name: Dee Herrera  : 1990  MRN: [id number]  Primary Care Physician:  Margarita Woods APRN  Date of admission: 10/26/2023    Subjective     Interval History:   Follow-up end-stage renal disease on peritoneal dialysis    The patient had CHETNA today but the reading is not available yet.  She denies any chest pain or shortness of air, no orthopnea or PND, no nausea or vomiting, tolerating her peritoneal dialysis.  And complaining of significant thirst    Review of Systems:   As noted above    Objective     Vitals:   Temp:  [97.5 °F (36.4 °C)-98.2 °F (36.8 °C)] 98.1 °F (36.7 °C)  Heart Rate:  [61-78] 73  Resp:  [16-24] 16  BP: (119-154)/(66-94) 152/82  Flow (L/min):  [10] 10    Intake/Output Summary (Last 24 hours) at 10/30/2023 1016  Last data filed at 10/30/2023 0856  Gross per 24 hour   Intake 58646 ml   Output 9400 ml   Net 2500 ml       Physical Exam:    General Appearance: Awake, alert, chronically ill and frail  Skin: warm and dry  HEENT: Oral mucosa is dry  Neck: supple, no JVD  Lungs: CTA, unlabored breathing effort  Heart: RRR, normal S1 and S2, no rub  Abdomen: soft, nontender, nondistended, normoactive bowels and PD catheter in place with clean exit site  Extremities: no edema, cyanosis or clubbing  Neuro: normal speech and mental status     Scheduled Meds:     carvedilol, 25 mg, Oral, BID With Meals  Delflex-LC/2.5% Dextrose, 1.5 L, Intraperitoneal, 6 Exchanges Daily (Q4H)  docusate sodium, 100 mg, Oral, BID  hydroxychloroquine, 200 mg, Oral, Q24H  mycophenolate, 250 mg, Oral, Q12H  senna-docusate sodium, 2 tablet, Oral, BID  sevelamer, 1,600 mg, Oral, TID With Meals  sodium chloride, 10 mL, Intravenous, Q12H  sodium chloride, 1 g, Oral, BID With Meals  Urea, 15 g, Oral, BID      IV Meds:        Results Reviewed:   I have personally reviewed the results from the time of this admission to 10/30/2023 10:16 EDT     Results from  last 7 days   Lab Units 10/30/23  0540 10/29/23  0645 10/28/23  0551 10/26/23  0314 10/26/23  0054   SODIUM mmol/L 128* 123* 128*   < > 122*   POTASSIUM mmol/L 4.0 4.8 3.1*   < > 3.8   CHLORIDE mmol/L 94* 89* 89*   < > 82*   CO2 mmol/L 23.0 21.7* 24.2   < > 22.0   BUN mg/dL 56* 34* 40*   < > 47*   CREATININE mg/dL 7.22* 8.04* 7.92*   < > 9.75*   CALCIUM mg/dL 8.5* 8.1* 8.4*   < > 9.9   BILIRUBIN mg/dL  --   --   --   --  0.8   ALK PHOS U/L  --   --   --   --  116   ALT (SGPT) U/L  --   --   --   --  71*   AST (SGOT) U/L  --   --   --   --  61*   GLUCOSE mg/dL 120* 86 133*   < > 101*    < > = values in this interval not displayed.       Estimated Creatinine Clearance: 9.3 mL/min (A) (by C-G formula based on SCr of 7.22 mg/dL (H)).    Results from last 7 days   Lab Units 10/30/23  0540 10/29/23  0645 10/27/23  0650   PHOSPHORUS mg/dL 3.2 3.1 6.6*             Results from last 7 days   Lab Units 10/30/23  0540 10/29/23  0645 10/28/23  0551 10/27/23  0650 10/26/23  0649   WBC 10*3/mm3 3.57 4.10 5.02 3.48 3.93   HEMOGLOBIN g/dL 10.8* 10.9* 10.9* 10.6* 10.9*   PLATELETS 10*3/mm3 93* 120* 102* 78* 104*       Results from last 7 days   Lab Units 10/26/23  0054   INR  1.15*       Assessment / Plan     ASSESSMENT:  End-stage renal disease on secondary to lupus nephritis on peritoneal dialysis  Severe hypertension on admission improved significantly associate with noncompliance  Hyponatremia associated with excessive water intake  Cardiomyopathy ejection fraction about 40%  Valvular heart disease with severe aortic insufficiency with mobile density on the aortic valve, she had CHETNA today she had negative blood culture  Thrombocytopenia, platelet 93,000  Anemia of chronic kidney disease hemoglobin 10.8  SLE.    PLAN:  Discontinue salt tablets and urea since hyponatremia associated with ESRD, instead we will restrict fluid intake to 1000 cc per 24 hours  Continue PD as per present regimen  Surveillance labs    I reviewed the  chart and other providers note, I reviewed imaging and lab data.  I discussed the case with the patient's nurse  Copied text in this note has been reviewed and is accurate as of 10/30/23.       Thank you for involving us in the care of Dee Herrera.  Please feel free to call with any questions.    Isaac Diaz MD  10/30/23  10:16 EDT    [facility]  [phone]    Please note that portions of this note were completed with a voice recognition program.

## 2023-10-30 NOTE — PROGRESS NOTES
[facility] Medicine Services  PROGRESS NOTE    Patient Name: Dee Herrera  : 1990  MRN: [id number]    Date of Admission: 10/26/2023  Primary Care Physician: Margarita Woods APRN    Subjective   Subjective     CC:  Follow-up low sodium    Subjective:  Patient remains quite frustrated by her illness.  She tolerated the CHETNA out without problem.  I discussed with her the issue with her refusing the salt tablets and how this was contributing to her volatile sodium readings.  I explained that if her low sodium is not treated properly it can lead to confusion, seizures, or death.  After my discussion she is now agreeable to take these medications recommended by nephrology.  She is awaiting evaluation from CT surgery for dissection.  She denies any chest pain.    Review of Systems  No current fevers or chills  No current shortness of breath or cough  No current nausea, vomiting, or diarrhea      Objective   Objective     Vital Signs:   Temp:  [97.5 °F (36.4 °C)-98.2 °F (36.8 °C)] 98.1 °F (36.7 °C)  Heart Rate:  [61-78] 74  Resp:  [16-24] 16  BP: (119-171)/(66-94) 171/86        Physical Exam:  Constitutional:Awake, alert  HENT: NCAT, mucous membranes moist, neck supple  Respiratory: No cough or wheezing, nonlabored breathing  Cardiovascular: Pulse rate is normal, normal radial pulses  Gastrointestinal:   soft, nontender, nondistended  Musculoskeletal: Thin frail and chronically debilitated appearance, BMI is 19, minimal lower extremity edema  Psychiatric: Reasonably calm affect, cooperative, conversational  Neurologic: No slurred speech or facial droop, follows commands  Skin: No rashes or jaundice, warm      Results Reviewed:  Results from last 7 days   Lab Units 10/30/23  0540 10/29/23  0645 10/28/23  0551 10/26/23  0649 10/26/23  0054   WBC 10*3/mm3 3.57 4.10 5.02   < > 4.49   HEMOGLOBIN g/dL 10.8* 10.9* 10.9*   < > 11.1*   HEMATOCRIT % 33.4* 34.2 33.4*   < > 34.8   PLATELETS 10*3/mm3 93* 120* 102*   <  > 121*   INR   --   --   --   --  1.15*    < > = values in this interval not displayed.     Results from last 7 days   Lab Units 10/30/23  0540 10/29/23  0645 10/28/23  0551 10/26/23  1256 10/26/23  0649 10/26/23  0314 10/26/23  0054   SODIUM mmol/L 128* 123* 128*   < > 122* 122* 122*   POTASSIUM mmol/L 4.0 4.8 3.1*   < > 4.0 4.0 3.8   CHLORIDE mmol/L 94* 89* 89*   < > 82* 82* 82*   CO2 mmol/L 23.0 21.7* 24.2   < > 19.0* 18.0* 22.0   BUN mg/dL 56* 34* 40*   < > 50* 49* 47*   CREATININE mg/dL 7.22* 8.04* 7.92*   < > 9.72* 9.48* 9.75*   GLUCOSE mg/dL 120* 86 133*   < > 104* 95 101*   CALCIUM mg/dL 8.5* 8.1* 8.4*   < > 9.6 9.8 9.9   ALK PHOS U/L  --   --   --   --   --   --  116   ALT (SGPT) U/L  --   --   --   --   --   --  71*   AST (SGOT) U/L  --   --   --   --   --   --  61*   HSTROP T ng/L  --   --   --   --  117* 123* 130*   PROBNP pg/mL  --   --   --   --   --   --  >70,000.0*    < > = values in this interval not displayed.     Estimated Creatinine Clearance: 9.3 mL/min (A) (by C-G formula based on SCr of 7.22 mg/dL (H)).    Microbiology Results Abnormal       Procedure Component Value - Date/Time    Blood Culture - Blood, Arm, Left [[id number]]  (Normal) Collected: 10/27/23 1652    Lab Status: Preliminary result Specimen: Blood from Arm, Left Updated: 10/29/23 1716     Blood Culture No growth at 2 days    Blood Culture - Blood, Arm, Right [[id number]]  (Normal) Collected: 10/27/23 1521    Lab Status: Preliminary result Specimen: Blood from Arm, Right Updated: 10/29/23 1601     Blood Culture No growth at 2 days    Respiratory Panel PCR w/COVID-19(SARS-CoV-2) [facility]/CASSY/MIRIAM/PAD/COR/MAD/NABEEL In-House, NP Swab in UTM/VTM, 3-4 HR TAT - Swab, Nasopharynx [[id number]]  (Normal) Collected: 10/26/23 0057    Lab Status: Final result Specimen: Swab from Nasopharynx Updated: 10/26/23 0152     ADENOVIRUS, PCR Not Detected     Coronavirus 229E Not Detected     Coronavirus HKU1 Not Detected     Coronavirus NL63 Not Detected      Coronavirus OC43 Not Detected     COVID19 Not Detected     Human Metapneumovirus Not Detected     Human Rhinovirus/Enterovirus Not Detected     Influenza A PCR Not Detected     Influenza B PCR Not Detected     Parainfluenza Virus 1 Not Detected     Parainfluenza Virus 2 Not Detected     Parainfluenza Virus 3 Not Detected     Parainfluenza Virus 4 Not Detected     RSV, PCR Not Detected     Bordetella pertussis pcr Not Detected     Bordetella parapertussis PCR Not Detected     Chlamydophila pneumoniae PCR Not Detected     Mycoplasma pneumo by PCR Not Detected    Narrative:      In the setting of a positive respiratory panel with a viral infection PLUS a negative procalcitonin without other underlying concern for bacterial infection, consider observing off antibiotics or discontinuation of antibiotics and continue supportive care. If the respiratory panel is positive for atypical bacterial infection (Bordetella pertussis, Chlamydophila pneumoniae, or Mycoplasma pneumoniae), consider antibiotic de-escalation to target atypical bacterial infection.            Imaging Results (Last 24 Hours)       Procedure Component Value Units Date/Time    CT Angiogram Chest [[id number]] Collected: 10/30/23 0940     Updated: 10/30/23 0950    Narrative:      Examination: CTA of the chest with contrast      TECHNIQUE: CTA of the chest with contrast per protocol. Radiation dose  reduction techniques were utilized, including automated exposure control  and exposure modulation based on body size. 3D reconstructions were  performed        HISTORY: Aortic aneurysm     COMPARISON:None available     FINDINGS: The thoracic aorta measures 3.8 cm at the sinuses of Valsalva,  4.7 cm below the level of the pulmonary artery, 3.4 cm at the level of  the pulmonary artery, 2.8 cm in the arch, and 2.2 cm at the left atrium.  There is a partial dissection flap in the proximal ascending aorta just  above the level of the aortic valve.. There is common origin  of the  brachiocephalic and right common carotid arteries. The great vessels of  the neck and upper abdominal vessels are otherwise normal. There is  annuloaortic ectasia.     There is a small right-sided pleural effusion. Atelectasis is seen  bilaterally. No consolidation or pneumothorax are seen. The central  airways are patent.     There is moderate cardiomegaly, without pericardial effusion. No  enlarged supraclavicular, axillary, mediastinal, or hilar lymph nodes  are demonstrated.     Limited evaluation of the upper abdomen demonstrates intraperitoneal  free gas. There is incompletely evaluated ascites.     Bone windows demonstrate no suspicious osseous lesions.       Impression:      1. Ascending aortic aneurysm/dissection with annuloaortic ectasia,  suggesting connective tissue disease     2. Moderate cardiomegaly     3. Small right pleural effusion     4. Intraperitoneal free gas and incompletely evaluated ascites, likely  secondary to peritoneal dialysis     5. Incidental findings as above.     These findings were discussed with Dr Bliss by Dr Acosta at the time  of dictation.     This report was finalized on 10/30/2023 9:47 AM by Dr. Nilay Acosta M.D  on Workstation: [id number]               Results for orders placed during the hospital encounter of 10/26/23    Adult Transthoracic Echo Complete W/ Cont if Necessary Per Protocol    Interpretation Summary    There is a large mobile echodensity which appears to be attached to the right coronary leaflet of the aortic valve. Differential includes vegetation, thrombus, and fibroelastoma    Severe aortic valve regurgitation is present.    The left ventricular cavity is borderline dilated.    Left ventricular systolic function is mildly decreased. Left ventricular ejection fraction appears to be 46 - 50%.    Left ventricular wall thickness is consistent with moderate to severe concentric hypertrophy.    The myocardium is speckled and bright in appearance,  consistent with infiltrative cardiomyopathy versus chronic kidney disease related changes.    There is grade III restrictive diastolic dysfunction (Valsalva not performed)    The left atrial cavity is moderately dilated.    There is moderate posteriorly directed mitral regurgitation    Mild to moderate tricuspid valve regurgitation is present.    Calculated right ventricular systolic pressure from tricuspid regurgitation is 45 mmHg.    The ascending aorta is mildly enlarged at 3.8 cm. The main pulmonary artery is enlarged at 2.5 cm    There is a trivial pericardial effusion.      I have reviewed the medications:  Scheduled Meds:carvedilol, 25 mg, Oral, BID With Meals  Delflex-LC/2.5% Dextrose, 1.5 L, Intraperitoneal, 6 Exchanges Daily (Q4H)  docusate sodium, 100 mg, Oral, BID  hydroxychloroquine, 200 mg, Oral, Q24H  mycophenolate, 250 mg, Oral, Q12H  senna-docusate sodium, 2 tablet, Oral, BID  sevelamer, 1,600 mg, Oral, TID With Meals  sodium chloride, 10 mL, Intravenous, Q12H      Continuous Infusions:   PRN Meds:.  acetaminophen **OR** acetaminophen **OR** acetaminophen    senna-docusate sodium **AND** polyethylene glycol **AND** bisacodyl **AND** bisacodyl    calcium carbonate    labetalol    nitroglycerin    ondansetron **OR** ondansetron    prochlorperazine **OR** prochlorperazine **OR** prochlorperazine    [COMPLETED] Insert Peripheral IV **AND** sodium chloride    sodium chloride    sodium chloride    traMADol    Assessment & Plan   Assessment & Plan     Active Hospital Problems    Diagnosis  POA    **Dissecting ascending aortic aneurysm [I71.010]  Yes    Aortic valve lesion [I35.8]  Yes    Severe aortic valve regurgitation [I35.1]  Yes    Hyponatremia [E87.1]  Yes    Poor appetite [R63.0]  Yes    Moderate malnutrition [E44.0]  Yes    Chronic diastolic CHF (congestive heart failure) [I50.32]  Yes    Anemia due to chronic kidney disease, on chronic dialysis [N18.6, D63.1, Z99.2]  Not Applicable    Peritoneal  dialysis catheter in place [Z99.2]  Not Applicable    Essential hypertension [I10]  Yes    End stage renal disease on dialysis [N18.6, Z99.2]  Not Applicable    Seizure disorder [G40.909]  Yes    Elevated liver function tests [R79.89]  Yes    Pericardial effusion [I31.39]  Yes    Systemic lupus erythematosus [M32.9]  Yes    Thrombocytopenia [D69.6]  Yes    Hypertension secondary to other renal disorders [I15.1]  Yes    Vitamin D deficiency [E55.9]  Yes      Resolved Hospital Problems    Diagnosis Date Resolved POA    Abdominal pain [R10.9] 10/28/2023 Yes        Brief Hospital Course to date:  Dee Herrera is a 33 y.o. female presents to the hospital with weakness and shortness of breath found to have severe hyponatremia.     Discussion/plan for today:  CHETNA concerning for aortic dissection.  Cardiothoracic surgery consult.  Plan to follow-up on their recommendations.  Patient had been refusing soft tablets that were ordered to improve her hyponatremia.  After extensive counseling she is not willing to take them.  We will need to be notified by nursing if she refuses them again.  I have had a jessica discussion with her that she has some complicated significant illness and refusing recommended treatment could lead to worsening morbidity or mortality.  For the time being she agrees to take this medicine and follow our recommendations.  Sodium level better today.      Ascending aortic dissection and concern for connective tissue disorder:  Seen on CHETNA.  Cardiothoracic surgery consult.    Hyponatremia:  Trend sodium level.  Salt tablets.  Urea.  Nephrology adjusting peritoneal dialysis.  Monitor for any associated issues with metabolic problem.     Hypertensive urgency with essential hypertension:  Initially severely elevated blood pressure.  Possible noncompliance with home medications or dialysis.  Blood pressure improved.  Stop calcium channel blocker for now.  Blood pressure is quite labile.  Labetalol as needed for  greater than 180.     End-stage renal disease on peritoneal dialysis:  Nephrology consult.  Dialyze as needed.  Electrolytes reviewed and patient has metabolic acidosis.     Chronic diastolic heart failure with history of pericardial effusion:  Continue home cardiac medications.  Consult cardiology, sees Dr. Taylor.  In the setting of shortness of breath and known pericardial effusion plan to recheck echocardiogram to reevaluate.     Malnutrition and poor appetite:  Zofran as needed for nausea.  Nutrition consult and maximize nutrition is much as needed.  Continue to encourage diet and oral intake.     Chronic abdominal pain: Possibly related to heart failure, electrolyte abnormalities, or pulmonary hypertension.  Chronic issue now symptoms resolved.  Abdominal exam is benign.     Thrombocytopenia: Chronic issue.  No bleeding noted.     Lupus:, Immunosuppressed: Continue Plaquenil and CellCept.  Monitor carefully     Anemia of chronic kidney disease: Trend hemoglobin.     Seizure disorder: Denies any recent seizures for several years.  Monitor.        DVT Prophylaxis: Mechanical      Disposition: Likely home pending clinical course    CODE STATUS:   Code Status and Medical Interventions:   Ordered at: 10/26/23 0434     Code Status (Patient has no pulse and is not breathing):    CPR (Attempt to Resuscitate)     Medical Interventions (Patient has pulse or is breathing):    Full Support       Mike Lezama MD  10/30/23

## 2023-10-30 NOTE — ANESTHESIA PREPROCEDURE EVALUATION
Anesthesia Evaluation     Patient summary reviewed and Nursing notes reviewed   NPO Solid Status: > 8 hours  NPO Liquid Status: > 4 hours           Airway   Mallampati: II  Neck ROM: full  No difficulty expected  Dental - normal exam     Pulmonary     breath sounds clear to auscultation  (+) ,shortness of breath  Cardiovascular     Rhythm: regular    (+) hypertension, valvular problems/murmurs (aortic valve lesion), CHF , pericardial effusion      Neuro/Psych  (+) seizures, headaches  GI/Hepatic/Renal/Endo    (+) liver disease cirrhosis, renal disease- dialysis    Musculoskeletal     Abdominal    Substance History      OB/GYN          Other                    Anesthesia Plan    ASA 4     MAC     intravenous induction     Anesthetic plan, risks, benefits, and alternatives have been provided, discussed and informed consent has been obtained with: patient.    CODE STATUS:    Code Status (Patient has no pulse and is not breathing): CPR (Attempt to Resuscitate)  Medical Interventions (Patient has pulse or is breathing): Full Support

## 2023-10-31 LAB
ALBUMIN SERPL-MCNC: 2.3 G/DL (ref 3.5–5.2)
ANION GAP SERPL CALCULATED.3IONS-SCNC: 11.9 MMOL/L (ref 5–15)
APTT PPP: 29.1 SECONDS (ref 22.7–35.4)
ARTERIAL PATENCY WRIST A: POSITIVE
ATMOSPHERIC PRESS: 753.9 MMHG
BASE EXCESS BLDA CALC-SCNC: 1.8 MMOL/L (ref 0–2)
BASOPHILS # BLD AUTO: 0.02 10*3/MM3 (ref 0–0.2)
BASOPHILS NFR BLD AUTO: 0.8 % (ref 0–1.5)
BDY SITE: ABNORMAL
BUN SERPL-MCNC: 48 MG/DL (ref 6–20)
BUN/CREAT SERPL: 6.7 (ref 7–25)
CALCIUM SPEC-SCNC: 8.2 MG/DL (ref 8.6–10.5)
CHLORIDE SERPL-SCNC: 90 MMOL/L (ref 98–107)
CLOSE TME COLL+ADP + EPINEP PNL BLD: 94 % (ref 86–100)
CO2 BLDA-SCNC: 27.1 MMOL/L (ref 23–27)
CO2 SERPL-SCNC: 23.1 MMOL/L (ref 22–29)
CREAT SERPL-MCNC: 7.17 MG/DL (ref 0.57–1)
DEPRECATED RDW RBC AUTO: 52 FL (ref 37–54)
DEVICE COMMENT: ABNORMAL
EGFRCR SERPLBLD CKD-EPI 2021: 7.3 ML/MIN/1.73
EOSINOPHIL # BLD AUTO: 0.07 10*3/MM3 (ref 0–0.4)
EOSINOPHIL NFR BLD AUTO: 2.7 % (ref 0.3–6.2)
ERYTHROCYTE [DISTWIDTH] IN BLOOD BY AUTOMATED COUNT: 17.8 % (ref 12.3–15.4)
GLUCOSE SERPL-MCNC: 126 MG/DL (ref 65–99)
HCO3 BLDA-SCNC: 25.9 MMOL/L (ref 22–28)
HCT VFR BLD AUTO: 35.2 % (ref 34–46.6)
HEMODILUTION: NO
HGB BLD-MCNC: 11.3 G/DL (ref 12–15.9)
INR PPP: 1.05 (ref 0.9–1.1)
LYMPHOCYTES # BLD AUTO: 0.49 10*3/MM3 (ref 0.7–3.1)
LYMPHOCYTES NFR BLD AUTO: 19.1 % (ref 19.6–45.3)
MAGNESIUM SERPL-MCNC: 1.6 MG/DL (ref 1.6–2.6)
MCH RBC QN AUTO: 26.8 PG (ref 26.6–33)
MCHC RBC AUTO-ENTMCNC: 32.1 G/DL (ref 31.5–35.7)
MCV RBC AUTO: 83.6 FL (ref 79–97)
MODALITY: ABNORMAL
MONOCYTES # BLD AUTO: 0.22 10*3/MM3 (ref 0.1–0.9)
MONOCYTES NFR BLD AUTO: 8.6 % (ref 5–12)
NEUTROPHILS NFR BLD AUTO: 1.75 10*3/MM3 (ref 1.7–7)
NEUTROPHILS NFR BLD AUTO: 68 % (ref 42.7–76)
PCO2 BLDA: 38.2 MM HG (ref 35–45)
PH BLDA: 7.44 PH UNITS (ref 7.35–7.45)
PHOSPHATE SERPL-MCNC: 3.1 MG/DL (ref 2.5–4.5)
PLATELET # BLD AUTO: 85 10*3/MM3 (ref 140–450)
PMV BLD AUTO: 10.4 FL (ref 6–12)
PO2 BLDA: 93.8 MM HG (ref 80–100)
POTASSIUM SERPL-SCNC: 3.8 MMOL/L (ref 3.5–5.2)
PROTHROMBIN TIME: 13.9 SECONDS (ref 11.7–14.2)
RBC # BLD AUTO: 4.21 10*6/MM3 (ref 3.77–5.28)
SAO2 % BLDCOA: 97.6 % (ref 92–98.5)
SARS-COV-2 RNA RESP QL NAA+PROBE: NOT DETECTED
SODIUM SERPL-SCNC: 125 MMOL/L (ref 136–145)
TOTAL RATE: 16 BREATHS/MINUTE
WBC NRBC COR # BLD: 2.57 10*3/MM3 (ref 3.4–10.8)

## 2023-10-31 PROCEDURE — 85610 PROTHROMBIN TIME: CPT | Performed by: NURSE PRACTITIONER

## 2023-10-31 PROCEDURE — 63710000001 MYCOPHENOLATE MOFETIL PER 250 MG: Performed by: INTERNAL MEDICINE

## 2023-10-31 PROCEDURE — 83735 ASSAY OF MAGNESIUM: CPT | Performed by: INTERNAL MEDICINE

## 2023-10-31 PROCEDURE — 87635 SARS-COV-2 COVID-19 AMP PRB: CPT | Performed by: NURSE PRACTITIONER

## 2023-10-31 PROCEDURE — 85576 BLOOD PLATELET AGGREGATION: CPT | Performed by: NURSE PRACTITIONER

## 2023-10-31 PROCEDURE — 85730 THROMBOPLASTIN TIME PARTIAL: CPT | Performed by: NURSE PRACTITIONER

## 2023-10-31 PROCEDURE — 99232 SBSQ HOSP IP/OBS MODERATE 35: CPT | Performed by: INTERNAL MEDICINE

## 2023-10-31 PROCEDURE — 36600 WITHDRAWAL OF ARTERIAL BLOOD: CPT

## 2023-10-31 PROCEDURE — 99024 POSTOP FOLLOW-UP VISIT: CPT | Performed by: THORACIC SURGERY (CARDIOTHORACIC VASCULAR SURGERY)

## 2023-10-31 PROCEDURE — 82803 BLOOD GASES ANY COMBINATION: CPT

## 2023-10-31 PROCEDURE — 85025 COMPLETE CBC W/AUTO DIFF WBC: CPT | Performed by: INTERNAL MEDICINE

## 2023-10-31 PROCEDURE — 80069 RENAL FUNCTION PANEL: CPT | Performed by: INTERNAL MEDICINE

## 2023-10-31 RX ORDER — CHLORHEXIDINE GLUCONATE ORAL RINSE 1.2 MG/ML
15 SOLUTION DENTAL EVERY 12 HOURS SCHEDULED
Status: DISCONTINUED | OUTPATIENT
Start: 2023-11-01 | End: 2023-11-02

## 2023-10-31 RX ORDER — CEFAZOLIN SODIUM 2 G/100ML
2000 INJECTION, SOLUTION INTRAVENOUS
Status: COMPLETED | OUTPATIENT
Start: 2023-11-02 | End: 2023-11-02

## 2023-10-31 RX ORDER — CHLORHEXIDINE GLUCONATE 500 MG/1
1 CLOTH TOPICAL EVERY 12 HOURS
Status: DISCONTINUED | OUTPATIENT
Start: 2023-11-01 | End: 2023-11-02

## 2023-10-31 RX ORDER — DEXTROSE MONOHYDRATE, SODIUM CHLORIDE, SODIUM LACTATE, CALCIUM CHLORIDE, MAGNESIUM CHLORIDE 2.5; 538; 448; 18.4; 5.08 G/100ML; MG/100ML; MG/100ML; MG/100ML; MG/100ML
2000 SOLUTION INTRAPERITONEAL
Status: DISCONTINUED | OUTPATIENT
Start: 2023-10-31 | End: 2023-11-02

## 2023-10-31 RX ADMIN — DEXTROSE MONOHYDRATE, SODIUM CHLORIDE, SODIUM LACTATE, CALCIUM CHLORIDE, MAGNESIUM CHLORIDE 1500 ML: 2.5; 538; 448; 18.4; 5.08 SOLUTION INTRAPERITONEAL at 00:11

## 2023-10-31 RX ADMIN — DEXTROSE MONOHYDRATE, SODIUM CHLORIDE, SODIUM LACTATE, CALCIUM CHLORIDE, MAGNESIUM CHLORIDE 2000 ML: 2.5; 538; 448; 18.4; 5.08 SOLUTION INTRAPERITONEAL at 16:12

## 2023-10-31 RX ADMIN — DEXTROSE MONOHYDRATE, SODIUM CHLORIDE, SODIUM LACTATE, CALCIUM CHLORIDE, MAGNESIUM CHLORIDE 2000 ML: 2.5; 538; 448; 18.4; 5.08 SOLUTION INTRAPERITONEAL at 20:15

## 2023-10-31 RX ADMIN — DEXTROSE MONOHYDRATE, SODIUM CHLORIDE, SODIUM LACTATE, CALCIUM CHLORIDE, MAGNESIUM CHLORIDE 1500 ML: 2.5; 538; 448; 18.4; 5.08 SOLUTION INTRAPERITONEAL at 04:34

## 2023-10-31 RX ADMIN — TRAMADOL HYDROCHLORIDE 50 MG: 50 TABLET ORAL at 05:20

## 2023-10-31 RX ADMIN — TRAMADOL HYDROCHLORIDE 50 MG: 50 TABLET ORAL at 11:46

## 2023-10-31 RX ADMIN — CARVEDILOL 25 MG: 25 TABLET, FILM COATED ORAL at 08:35

## 2023-10-31 RX ADMIN — DEXTROSE MONOHYDRATE, SODIUM CHLORIDE, SODIUM LACTATE, CALCIUM CHLORIDE, MAGNESIUM CHLORIDE 2000 ML: 2.5; 538; 448; 18.4; 5.08 SOLUTION INTRAPERITONEAL at 12:20

## 2023-10-31 RX ADMIN — MYCOPHENOLATE MOFETIL 250 MG: 500 TABLET ORAL at 11:46

## 2023-10-31 RX ADMIN — DEXTROSE MONOHYDRATE, SODIUM CHLORIDE, SODIUM LACTATE, CALCIUM CHLORIDE, MAGNESIUM CHLORIDE 1500 ML: 2.5; 538; 448; 18.4; 5.08 SOLUTION INTRAPERITONEAL at 08:34

## 2023-10-31 NOTE — PROGRESS NOTES
[facility] Follow Up    Chief Complaint: Follow up aortic aneurysm with dissection, aortic valve regurgitation    Interval History: The patient indicated that she was agreeable to proceed with surgical intervention of her aortic aneurysm/dissection and aortic valve this admission.  She is now tentatively scheduled for Thursday.  She denies any chest pain or shortness of breath this morning.    Objective:     Objective:  Temp:  [97.7 °F (36.5 °C)-98.4 °F (36.9 °C)] 97.7 °F (36.5 °C)  Heart Rate:  [65-78] 65  Resp:  [16-24] 18  BP: (114-171)/(66-86) 114/66     Intake/Output Summary (Last 24 hours) at 10/31/2023 0741  Last data filed at 10/31/2023 0435  Gross per 24 hour   Intake 64242 ml   Output 8100 ml   Net 5260 ml     Body mass index is 19.15 kg/m².      10/26/23  0012 10/27/23  1127 10/30/23  1008   Weight: 52.2 kg (115 lb) 52.2 kg (115 lb) 52.2 kg (115 lb 1.3 oz)     Weight change:       Physical Exam:   General : Alert, cooperative, in no acute distress.  Neuro: Alert,cooperative and oriented.  Lungs: CTAB. Normal respiratory effort and rate.  CV: Regular rate and rhythm, normal S1 and S2, no murmurs, gallops or rubs.  ABD: Soft, nontender, nondistended. Positive bowel sounds.  Extr: No edema or cyanosis, moves all extremities.    Lab Review:   Results from last 7 days   Lab Units 10/31/23  0520 10/30/23  0540 10/26/23  0314 10/26/23  0054   SODIUM mmol/L 125* 128*   < > 122*   POTASSIUM mmol/L 3.8 4.0   < > 3.8   CHLORIDE mmol/L 90* 94*   < > 82*   CO2 mmol/L 23.1 23.0   < > 22.0   BUN mg/dL 48* 56*   < > 47*   CREATININE mg/dL 7.17* 7.22*   < > 9.75*   GLUCOSE mg/dL 126* 120*   < > 101*   CALCIUM mg/dL 8.2* 8.5*   < > 9.9   AST (SGOT) U/L  --   --   --  61*   ALT (SGPT) U/L  --   --   --  71*    < > = values in this interval not displayed.     Results from last 7 days   Lab Units 10/26/23  0649 10/26/23  0314 10/26/23  0054   HSTROP T ng/L 117* 123* 130*     Results from last 7 days  "  Lab Units 10/31/23  0520 10/30/23  0540   WBC 10*3/mm3 2.57* 3.57   HEMOGLOBIN g/dL 11.3* 10.8*   HEMATOCRIT % 35.2 33.4*   PLATELETS 10*3/mm3 85* 93*     Results from last 7 days   Lab Units 10/31/23  0520 10/26/23  0054   INR  1.05 1.15*   APTT seconds 29.1 29.0     Results from last 7 days   Lab Units 10/31/23  0520 10/30/23  0540   MAGNESIUM mg/dL 1.6 1.5*           Invalid input(s): \"LDLCALC\"  Results from last 7 days   Lab Units 10/30/23  1511 10/26/23  0054   PROBNP pg/mL >70,000.0* >70,000.0*         I reviewed the patient's new clinical results.  I personally viewed and interpreted the patient's EKG  Current Medications:   Scheduled Meds:carvedilol, 25 mg, Oral, BID With Meals  Delflex-LC/2.5% Dextrose, 1.5 L, Intraperitoneal, 6 Exchanges Daily (Q4H)  docusate sodium, 100 mg, Oral, BID  mycophenolate, 250 mg, Oral, Q12H  senna-docusate sodium, 2 tablet, Oral, BID  sevelamer, 1,600 mg, Oral, TID With Meals  sodium chloride, 10 mL, Intravenous, Q12H      Continuous Infusions:     Allergies:  Allergies   Allergen Reactions    Minoxidil Other (See Comments) and Hives     Pericardial effusion .       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  Tentative plan for surgery Thursday morning.  Severe aortic valve regurgitation.  For surgery later this week as above.  Hopefully it can be repaired but may require valve replacement.  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  On peritoneal dialysis.  Hypovolemic acute hyponatremia.  Remains low but stable.  Hypertension. BP currently well controlled.   Chronic anemia. Stable.   Systemic lupus erythematosus. Plaquenil on hold in anticipation of surgery. Remains on mycophenolate.  Thrombocytopenia.  Chronic and appears to be around baseline.     -Continue current dose of carvedilol.  Monitor blood pressures and add further medications as needed.  - Plan for surgery of her aortic aneurysm/dissection and aortic valve " regurgitation later this week with Dr. Medina.      Juana Taylor MD  10/31/23  07:41 EDT

## 2023-10-31 NOTE — PROGRESS NOTES
" LOS: 5 days   Patient Care Team:  Margarita Woods APRN as PCP - General (Nurse Practitioner)  Winnie Sanchez MD as Referring Physician (Obstetrics and Gynecology)  Norberto Almaraz MD PhD as Consulting Physician (Hematology and Oncology)  Lupis Thomas MD as Consulting Physician (Nephrology)  Hair Mckeon MD as Consulting Physician (Nephrology)  Juana Taylor MD as Consulting Physician (Cardiology)    Chief Complaint: aortic insufficiency    Subjective:  Symptoms:  No shortness of breath, cough or chest pain.    Diet:  Adequate intake.  No nausea or vomiting.    Activity level: Returning to normal.    Pain:  She reports no pain.          Vital Signs  Temp:  [97.7 °F (36.5 °C)-98.4 °F (36.9 °C)] 97.7 °F (36.5 °C)  Heart Rate:  [65-78] 65  Resp:  [16-18] 18  BP: (113-171)/(66-86) 113/69  Body mass index is 19.15 kg/m².    Intake/Output Summary (Last 24 hours) at 10/31/2023 0959  Last data filed at 10/31/2023 0834  Gross per 24 hour   Intake 85202 ml   Output 86297 ml   Net 4060 ml     I/O this shift:  In: 1500   Out: 2100           10/26/23  0012 10/27/23  1127 10/30/23  1008   Weight: 52.2 kg (115 lb) 52.2 kg (115 lb) 52.2 kg (115 lb 1.3 oz)         Objective:  General Appearance:  Comfortable and in no acute distress.    Vital signs: (most recent): Blood pressure 112/68, pulse 68, temperature 97.7 °F (36.5 °C), temperature source Oral, resp. rate 18, height 165.1 cm (65\"), weight 52.2 kg (115 lb 1.3 oz), last menstrual period 08/10/2022, SpO2 100%, not currently breastfeeding.  Vital signs are normal.  No fever.    Output: No urine output.    Lungs:  Normal effort and normal respiratory rate.  There are decreased breath sounds.    Heart: Normal rate.  Regular rhythm.  Positive for murmur.    Abdomen: Abdomen is soft.  (PD catheter in place).  Bowel sounds are normal.     Extremities: There is no dependent edema.    Pulses: Distal pulses are intact.    Neurological: Patient is alert and " oriented to person, place and time.    Skin:  Warm and dry.            Results Review:        WBC WBC   Date Value Ref Range Status   10/31/2023 2.57 (L) 3.40 - 10.80 10*3/mm3 Final   10/30/2023 3.57 3.40 - 10.80 10*3/mm3 Final   10/29/2023 4.10 3.40 - 10.80 10*3/mm3 Final      HGB Hemoglobin   Date Value Ref Range Status   10/31/2023 11.3 (L) 12.0 - 15.9 g/dL Final   10/30/2023 10.8 (L) 12.0 - 15.9 g/dL Final   10/29/2023 10.9 (L) 12.0 - 15.9 g/dL Final      HCT Hematocrit   Date Value Ref Range Status   10/31/2023 35.2 34.0 - 46.6 % Final   10/30/2023 33.4 (L) 34.0 - 46.6 % Final   10/29/2023 34.2 34.0 - 46.6 % Final      Platelets Platelets   Date Value Ref Range Status   10/31/2023 85 (L) 140 - 450 10*3/mm3 Final   10/30/2023 93 (L) 140 - 450 10*3/mm3 Final   10/29/2023 120 (L) 140 - 450 10*3/mm3 Final        PT/INR:    Protime   Date Value Ref Range Status   10/31/2023 13.9 11.7 - 14.2 Seconds Final   /  INR   Date Value Ref Range Status   10/31/2023 1.05 0.90 - 1.10 Final       Sodium Sodium   Date Value Ref Range Status   10/31/2023 125 (L) 136 - 145 mmol/L Final   10/30/2023 128 (L) 136 - 145 mmol/L Final   10/29/2023 123 (L) 136 - 145 mmol/L Final      Potassium Potassium   Date Value Ref Range Status   10/31/2023 3.8 3.5 - 5.2 mmol/L Final   10/30/2023 4.0 3.5 - 5.2 mmol/L Final   10/29/2023 4.8 3.5 - 5.2 mmol/L Final      Chloride Chloride   Date Value Ref Range Status   10/31/2023 90 (L) 98 - 107 mmol/L Final   10/30/2023 94 (L) 98 - 107 mmol/L Final   10/29/2023 89 (L) 98 - 107 mmol/L Final      Bicarbonate CO2   Date Value Ref Range Status   10/31/2023 23.1 22.0 - 29.0 mmol/L Final   10/30/2023 23.0 22.0 - 29.0 mmol/L Final   10/29/2023 21.7 (L) 22.0 - 29.0 mmol/L Final      BUN BUN   Date Value Ref Range Status   10/31/2023 48 (H) 6 - 20 mg/dL Final   10/30/2023 56 (H) 6 - 20 mg/dL Final   10/29/2023 34 (H) 6 - 20 mg/dL Final      Creatinine Creatinine   Date Value Ref Range Status   10/31/2023 7.17  (H) 0.57 - 1.00 mg/dL Final   10/30/2023 7.22 (H) 0.57 - 1.00 mg/dL Final   10/29/2023 8.04 (H) 0.57 - 1.00 mg/dL Final      Calcium Calcium   Date Value Ref Range Status   10/31/2023 8.2 (L) 8.6 - 10.5 mg/dL Final   10/30/2023 8.5 (L) 8.6 - 10.5 mg/dL Final   10/29/2023 8.1 (L) 8.6 - 10.5 mg/dL Final      Magnesium Magnesium   Date Value Ref Range Status   10/31/2023 1.6 1.6 - 2.6 mg/dL Final   10/30/2023 1.5 (L) 1.6 - 2.6 mg/dL Final          carvedilol, 25 mg, Oral, BID With Meals  Delflex-LC/2.5% Dextrose, 1.5 L, Intraperitoneal, 6 Exchanges Daily (Q4H)  docusate sodium, 100 mg, Oral, BID  mycophenolate, 250 mg, Oral, Q12H  senna-docusate sodium, 2 tablet, Oral, BID  sevelamer, 1,600 mg, Oral, TID With Meals  sodium chloride, 10 mL, Intravenous, Q12H         Assessment & Plan  - ascending aortic aneurysm with dissection  - severe aortic valve insufficiency  - ESRD on PD  - Lupus--on Cellcept/plaquenil; currently held  - hx of seizures  - chronic immunosuppression  - hypertension  - chronic anemia  - TCP--chronic    Preoperative work up underway  Plan for surgery Thursday morning with Dr. Medina  Cellcept/plaquenil on hold. Platelet count 85k this morning. Will repeat CBC tomorrow and likely transfuse platelets prior to surgery  Transfer to CVU/CVI  Questions answered with verbalized understanding    CAESAR Hernandez  10/31/23  09:59 EDT

## 2023-10-31 NOTE — PROGRESS NOTES
[facility] Medicine Services  PROGRESS NOTE    Patient Name: Dee Herrera  : 1990  MRN: [id number]    Date of Admission: 10/26/2023  Primary Care Physician: Margarita Woods APRN    Subjective   Subjective     CC:  Follow-up low sodium    Subjective:  Patient states she is glad she does not have to take the salt tablets any further.  She says she is agreeable to surgery currently and is spoken to family further.  No new complaints.    Review of Systems  No current fevers or chills  No current shortness of breath or cough  No current nausea, vomiting, or diarrhea      Objective   Objective     Vital Signs:   Temp:  [97.7 °F (36.5 °C)-98.4 °F (36.9 °C)] 97.7 °F (36.5 °C)  Heart Rate:  [65-78] 65  Resp:  [16-18] 18  BP: (113-157)/(66-82) 113/69        Physical Exam:  Constitutional:Awake, alert  HENT: NCAT, mucous membranes moist, neck supple  Respiratory: No cough or wheezing, nonlabored breathing  Cardiovascular: Pulse rate is normal, normal radial pulses  Gastrointestinal:   soft, nontender, nondistended  Musculoskeletal: Thin frail and chronically debilitated appearance, BMI is 19, minimal lower extremity edema  Psychiatric: Reasonably calm affect, cooperative, conversational  Neurologic: No slurred speech or facial droop, follows commands  Skin: No rashes or jaundice, warm      Results Reviewed:  Results from last 7 days   Lab Units 10/31/23  0520 10/30/23  0540 10/29/23  0645 10/26/23  0649 10/26/23  0054   WBC 10*3/mm3 2.57* 3.57 4.10   < > 4.49   HEMOGLOBIN g/dL 11.3* 10.8* 10.9*   < > 11.1*   HEMATOCRIT % 35.2 33.4* 34.2   < > 34.8   PLATELETS 10*3/mm3 85* 93* 120*   < > 121*   INR  1.05  --   --   --  1.15*    < > = values in this interval not displayed.     Results from last 7 days   Lab Units 10/31/23  0520 10/30/23  1511 10/30/23  0540 10/29/23  0645 10/26/23  1256 10/26/23  0649 10/26/23  0314 10/26/23  0054   SODIUM mmol/L 125*  --  128* 123*   < > 122* 122* 122*   POTASSIUM mmol/L 3.8   --  4.0 4.8   < > 4.0 4.0 3.8   CHLORIDE mmol/L 90*  --  94* 89*   < > 82* 82* 82*   CO2 mmol/L 23.1  --  23.0 21.7*   < > 19.0* 18.0* 22.0   BUN mg/dL 48*  --  56* 34*   < > 50* 49* 47*   CREATININE mg/dL 7.17*  --  7.22* 8.04*   < > 9.72* 9.48* 9.75*   GLUCOSE mg/dL 126*  --  120* 86   < > 104* 95 101*   CALCIUM mg/dL 8.2*  --  8.5* 8.1*   < > 9.6 9.8 9.9   ALK PHOS U/L  --   --   --   --   --   --   --  116   ALT (SGPT) U/L  --   --   --   --   --   --   --  71*   AST (SGOT) U/L  --   --   --   --   --   --   --  61*   HSTROP T ng/L  --   --   --   --   --  117* 123* 130*   PROBNP pg/mL  --  >70,000.0*  --   --   --   --   --  >70,000.0*    < > = values in this interval not displayed.     Estimated Creatinine Clearance: 9.4 mL/min (A) (by C-G formula based on SCr of 7.17 mg/dL (H)).    Microbiology Results Abnormal       Procedure Component Value - Date/Time    Blood Culture - Blood, Arm, Left [[id number]]  (Normal) Collected: 10/27/23 1652    Lab Status: Preliminary result Specimen: Blood from Arm, Left Updated: 10/30/23 1715     Blood Culture No growth at 3 days    Blood Culture - Blood, Arm, Right [[id number]]  (Normal) Collected: 10/27/23 1521    Lab Status: Preliminary result Specimen: Blood from Arm, Right Updated: 10/30/23 1600     Blood Culture No growth at 3 days    Respiratory Panel PCR w/COVID-19(SARS-CoV-2) [facility]LAYTON/CASSY/MIRIAM/PAD/COR/MAD/NABEEL In-House, NP Swab in [facility], 3-4 HR TAT - Swab, Nasopharynx [[id number]]  (Normal) Collected: 10/26/23 0057    Lab Status: Final result Specimen: Swab from Nasopharynx Updated: 10/26/23 0152     ADENOVIRUS, PCR Not Detected     Coronavirus 229E Not Detected     Coronavirus HKU1 Not Detected     Coronavirus NL63 Not Detected     Coronavirus OC43 Not Detected     COVID19 Not Detected     Human Metapneumovirus Not Detected     Human Rhinovirus/Enterovirus Not Detected     Influenza A PCR Not Detected     Influenza B PCR Not Detected     Parainfluenza Virus 1 Not Detected      Parainfluenza Virus 2 Not Detected     Parainfluenza Virus 3 Not Detected     Parainfluenza Virus 4 Not Detected     RSV, PCR Not Detected     Bordetella pertussis pcr Not Detected     Bordetella parapertussis PCR Not Detected     Chlamydophila pneumoniae PCR Not Detected     Mycoplasma pneumo by PCR Not Detected    Narrative:      In the setting of a positive respiratory panel with a viral infection PLUS a negative procalcitonin without other underlying concern for bacterial infection, consider observing off antibiotics or discontinuation of antibiotics and continue supportive care. If the respiratory panel is positive for atypical bacterial infection (Bordetella pertussis, Chlamydophila pneumoniae, or Mycoplasma pneumoniae), consider antibiotic de-escalation to target atypical bacterial infection.            Imaging Results (Last 24 Hours)       ** No results found for the last 24 hours. **            Results for orders placed during the hospital encounter of 10/26/23    Adult Transesophageal Echo 3D (CHETNA) W/ Cont If Necessary Per Protocol    Interpretation Summary    Left ventricular ejection fraction appears to be 56 - 60%.    The left ventricular cavity is mildly dilated.    Left ventricular wall thickness is consistent with mild concentric hypertrophy.    Left ventricular diastolic function was not assessed.    Saline test results are negative.    Severe aortic valve regurgitation is present.    Moderate dilation of the aortic root is present. No dilation of the ascending aorta is present. There is an aortic root dissection      I have reviewed the medications:  Scheduled Meds:carvedilol, 25 mg, Oral, BID With Meals  Delflex-LC/2.5% Dextrose, 2,000 mL, Intraperitoneal, 6 Exchanges Daily (Q4H)  docusate sodium, 100 mg, Oral, BID  mycophenolate, 250 mg, Oral, Q12H  senna-docusate sodium, 2 tablet, Oral, BID  sevelamer, 1,600 mg, Oral, TID With Meals  sodium chloride, 10 mL, Intravenous, Q12H      Continuous  Infusions:   PRN Meds:.  acetaminophen **OR** acetaminophen **OR** acetaminophen    senna-docusate sodium **AND** polyethylene glycol **AND** bisacodyl **AND** bisacodyl    calcium carbonate    labetalol    nitroglycerin    ondansetron **OR** ondansetron    prochlorperazine **OR** prochlorperazine **OR** prochlorperazine    [COMPLETED] Insert Peripheral IV **AND** sodium chloride    sodium chloride    sodium chloride    traMADol    Assessment & Plan   Assessment & Plan     Active Hospital Problems    Diagnosis  POA    **Dissecting ascending aortic aneurysm [I71.010]  Yes    Aortic valve lesion [I35.8]  Yes    Severe aortic valve regurgitation [I35.1]  Yes    Hyponatremia [E87.1]  Yes    Poor appetite [R63.0]  Yes    Moderate malnutrition [E44.0]  Yes    Chronic diastolic CHF (congestive heart failure) [I50.32]  Yes    Anemia due to chronic kidney disease, on chronic dialysis [N18.6, D63.1, Z99.2]  Not Applicable    Peritoneal dialysis catheter in place [Z99.2]  Not Applicable    Essential hypertension [I10]  Yes    End stage renal disease on dialysis [N18.6, Z99.2]  Not Applicable    Seizure disorder [G40.909]  Yes    Elevated liver function tests [R79.89]  Yes    Pericardial effusion [I31.39]  Yes    Systemic lupus erythematosus [M32.9]  Yes    Thrombocytopenia [D69.6]  Yes    Hypertension secondary to other renal disorders [I15.1]  Yes    Pancytopenia [D61.818]  Yes    Vitamin D deficiency [E55.9]  Yes      Resolved Hospital Problems    Diagnosis Date Resolved POA    Abdominal pain [R10.9] 10/28/2023 Yes        Brief Hospital Course to date:  Dee Herrera is a 33 y.o. female presents to the hospital with weakness and shortness of breath found to have severe hyponatremia.     Discussion/plan for today:  I have entered the fluid restriction as recommended by the nephrologist.  Case was discussed with cardiology and we have convince patient to stay for recommended cardiac surgery per cardiothoracic team which now  is tentatively scheduled for Thursday.  I have discontinued Plaquenil per cardiothoracic recommendations.  I have reviewed and discussed treatment recommendations with the patient who is agreeable to stay in the hospital for now.  Blood pressure currently better and seems to be partly driven by volume.    Ascending aortic dissection and concern for connective tissue disorder:  Seen on CHETNA.  Cardiothoracic surgery recommended surgical intervention    Hyponatremia:  Trend sodium level.  Salt tablets given and now discontinued. Nephrology adjusting peritoneal dialysis.  Monitor for any associated issues with metabolic problem.     Hypertensive urgency with essential hypertension:  Initially severely elevated blood pressure.  Possible noncompliance with home medications or dialysis.  Blood pressure improved.  Stop calcium channel blocker for now.  Blood pressure is quite labile.  Labetalol as needed for greater than 180.     End-stage renal disease on peritoneal dialysis:  Nephrology consult.  Dialyze as needed.  Electrolytes reviewed and patient has metabolic acidosis.     Chronic diastolic heart failure with history of pericardial effusion:  Continue home cardiac medications.  Consult cardiology, sees Dr. Taylor.  CHF currently compensated.  No current effusion.     Malnutrition and poor appetite:  Zofran as needed for nausea.  Nutrition consult and maximize nutrition is much as needed.  Continue to encourage diet and oral intake.     Chronic abdominal pain: Possibly related to heart failure, electrolyte abnormalities, or pulmonary hypertension.  Chronic issue now symptoms resolved.  Abdominal exam is benign.     Thrombocytopenia: Chronic issue.  No bleeding noted.     Lupus:, Immunosuppressed: Hold Plaquenil poor cardiothoracic recommendations, continue CellCept.  Monitor carefully     Anemia of chronic kidney disease: Trend hemoglobin.     Seizure disorder: Denies any recent seizures for several years.  Monitor.       DVT Prophylaxis: Mechanical      Disposition: Likely home pending clinical course    CODE STATUS:   Code Status and Medical Interventions:   Ordered at: 10/26/23 0434     Code Status (Patient has no pulse and is not breathing):    CPR (Attempt to Resuscitate)     Medical Interventions (Patient has pulse or is breathing):    Full Support       Mike Lezama MD  10/31/23

## 2023-10-31 NOTE — CONSULTS
"Nutrition Services    Patient Name:  Dee Herrera  YOB: 1990  MRN: [id number]  Admit Date:  10/26/2023    Assessment Date:  10/31/23    Summary: Nutrition follow up    0-25% at meals.  Plans for surgery this week of her aortic aneurysm/dissection and AV regurgitation later this week.  CHETNA yesterday am.      Patient reports good appetite.  Says food has been okay.  Doesn't like Novasource Renal, but says she would drink an Ensure.  Explained that we have Boost and she is agreeable to this.  Adding Boost Plus daily (chocolate).    RD to continue to follow.    CLINICAL NUTRITION ASSESSMENT      Reason for Assessment Follow-up Protocol     Diagnosis/Problem SOA, hyponatremia, ESRD on dialysis, abd pain     Anthropometrics        Current Height  Current Weight  BMI kg/m2 Height: 165.1 cm (65\")  Weight: 52.2 kg (115 lb 1.3 oz) (10/30/23 1008)  Body mass index is 19.15 kg/m².   Adjusted BMI (if applicable)    BMI Category Normal/Healthy (18.4 - 24.9)   Ideal Body Weight (IBW) 125lb   Usual Body Weight (UBW) 120-155   Weight Trend Loss, 5lbs recently, 35lb overall   --  Labs       Pertinent Labs    Results from last 7 days   Lab Units 10/31/23  0520 10/30/23  0540 10/29/23  0645 10/26/23  0314 10/26/23  0054   SODIUM mmol/L 125* 128* 123*   < > 122*   POTASSIUM mmol/L 3.8 4.0 4.8   < > 3.8   CHLORIDE mmol/L 90* 94* 89*   < > 82*   CO2 mmol/L 23.1 23.0 21.7*   < > 22.0   BUN mg/dL 48* 56* 34*   < > 47*   CREATININE mg/dL 7.17* 7.22* 8.04*   < > 9.75*   CALCIUM mg/dL 8.2* 8.5* 8.1*   < > 9.9   BILIRUBIN mg/dL  --   --   --   --  0.8   ALK PHOS U/L  --   --   --   --  116   ALT (SGPT) U/L  --   --   --   --  71*   AST (SGOT) U/L  --   --   --   --  61*   GLUCOSE mg/dL 126* 120* 86   < > 101*    < > = values in this interval not displayed.     Results from last 7 days   Lab Units 10/31/23  0520 10/30/23  0540   MAGNESIUM mg/dL 1.6 1.5*   PHOSPHORUS mg/dL 3.1 3.2   HEMOGLOBIN g/dL 11.3* 10.8*   HEMATOCRIT % " 35.2 33.4*   WBC 10*3/mm3 2.57* 3.57   ALBUMIN g/dL 2.3* 2.5*     Results from last 7 days   Lab Units 10/31/23  0520 10/30/23  0540 10/29/23  0645 10/28/23  0551 10/27/23  0650 10/26/23  0649 10/26/23  0054   INR  1.05  --   --   --   --   --  1.15*   APTT seconds 29.1  --   --   --   --   --  29.0   PLATELETS 10*3/mm3 85* 93* 120* 102* 78*   < > 121*    < > = values in this interval not displayed.     COVID19   Date Value Ref Range Status   10/26/2023 Not Detected Not Detected - Ref. Range Final     Lab Results   Component Value Date    HGBA1C 5.10 10/30/2023          Medications           Scheduled Medications carvedilol, 25 mg, Oral, BID With Meals  [START ON 11/2/2023] ceFAZolin, 2,000 mg, Intravenous, On Call to OR  [START ON 11/1/2023] chlorhexidine, 15 mL, Mouth/Throat, Q12H  [START ON 11/1/2023] Chlorhexidine Gluconate Cloth, 1 application , Topical, Q12H  Delflex-LC/2.5% Dextrose, 2,000 mL, Intraperitoneal, 6 Exchanges Daily (Q4H)  docusate sodium, 100 mg, Oral, BID  [START ON 11/2/2023] metoprolol tartrate, 12.5 mg, Oral, On Call to OR  [START ON 11/1/2023] mupirocin, 1 application , Each Nare, Q12H  senna-docusate sodium, 2 tablet, Oral, BID  sevelamer, 1,600 mg, Oral, TID With Meals  sodium chloride, 10 mL, Intravenous, Q12H       Infusions     PRN Medications   acetaminophen **OR** acetaminophen **OR** acetaminophen    senna-docusate sodium **AND** polyethylene glycol **AND** bisacodyl **AND** bisacodyl    calcium carbonate    labetalol    nitroglycerin    ondansetron **OR** ondansetron    prochlorperazine **OR** prochlorperazine **OR** prochlorperazine    [COMPLETED] Insert Peripheral IV **AND** sodium chloride    sodium chloride    sodium chloride    traMADol     Physical Findings          General Findings alert, oriented, room air   Oral/Mouth Cavity WNL   Edema  no edema   Gastrointestinal last bowel movement: 10/30   Skin  skin intact   Tubes/Drains/Lines none   NFPE See Malnutrition Severity  Assessment   --  Malnutrition Severity Assessment      Patient meets criteria for : Moderate (non-severe) Malnutrition       Estimated/Assessed Needs        Current Weight  Weight: 52.2 kg (115 lb 1.3 oz) (10/30/23 1008)       Energy Requirements    Weight for Calculation 52.2 kg   Method for Estimation  30-35 kcal/kg   EST Needs (kcal/day) 6486-9448       Protein Requirements    Weight for Calculation 52.2 kg   EST Protein Needs (g/kg) 1.0 gm/kg, 1.5 gm/kg   EST Daily Needs (g/day) 52-78       Fluid Requirements     Method for Estimation 1 mL/kcal    EST Needs (mL/day)      Current Nutrition Orders & Evaluation of Intake       Oral Nutrition     Food Allergies NKFA   Current PO Diet Diet: Renal Diets, Fluid Restriction (240 mL/tray) Diets; Low Sodium (2-3g), Low Potassium; 1000 mL/day; Texture: Regular Texture (IDDSI 7); Fluid Consistency: Thin (IDDSI 0)  NPO Diet NPO Type: Strict NPO   Supplement Novasource Renal , Daily   PO Evaluation     % PO Intake 0-25%    Factors Affecting Intake: abdominal pain, decreased appetite   --  PES STATEMENT / NUTRITION DIAGNOSIS      Nutrition Dx Problem  Problem: Unintentional Weight Loss, Malnutrition (moderate), and Inadequate Oral Intake  Etiology: Factors Affecting Nutrition - decrease appetite, abd pain    Signs/Symptoms: PO intake, NFPE Results, Unintended Weight Change, and Report/Observation     NUTRITION INTERVENTION / PLAN OF CARE      Intervention Goal(s) Maintain nutrition status, Reduce/improve symptoms, Meet estimated needs, Disease management/therapy, Tolerate PO , Increase intake, Maintain weight, and PO intake goal %: 75+         RD Intervention/Action Interview for preferences, Encourage intake, Continue to monitor, Care plan reviewed, and Recommend/order: ONS   --      Prescription/Orders:       PO Diet       Supplements Boost Plus daily (dell)      Enteral Nutrition       Parenteral Nutrition    New Prescription Ordered? Yes   --      Monitor/Evaluation Per  protocol, PO intake, Weight, GI status   Discharge Plan/Needs Pending clinical course   --    RD to follow per protocol.      Electronically signed by:  Shell Horn RD  10/31/23 14:30 EDT

## 2023-10-31 NOTE — PROGRESS NOTES
[facility] Progress Note      Patient Name: Dee Herrera  : 1990  MRN: [id number]  Primary Care Physician:  Margarita Woods APRN  Date of admission: 10/26/2023    Subjective     Interval History:   Follow-up end-stage renal disease on peritoneal dialysis    She has severe aortic insufficiency and mitral insufficiency and was found to have dissection of the aortic root and most likely it is subacute if not chronic, has been evaluated by cardiovascular surgery with plan for surgical intervention in the next 48 hours  She denies any chest pain or shortness of air, no orthopnea or PND, no nausea or vomiting, tolerating her peritoneal dialysis.  And complaining of significant thirst    Review of Systems:   As noted above    Objective     Vitals:   Temp:  [97.7 °F (36.5 °C)-98.4 °F (36.9 °C)] 97.7 °F (36.5 °C)  Heart Rate:  [65-78] 65  Resp:  [16-18] 18  BP: (113-171)/(66-86) 113/69    Intake/Output Summary (Last 24 hours) at 10/31/2023 1004  Last data filed at 10/31/2023 0834  Gross per 24 hour   Intake 16871 ml   Output 67699 ml   Net 4060 ml       Physical Exam:    General Appearance: Awake, alert, chronically ill and frail  Skin: warm and dry  HEENT: Oral mucosa is dry  Neck: supple, no JVD  Lungs: CTA, unlabored breathing effort  Heart: RRR, normal S1 and S2, no rub  Abdomen: soft, nontender, nondistended, normoactive bowels and PD catheter in place with clean exit site  Extremities: no edema, cyanosis or clubbing  Neuro: normal speech and mental status     Scheduled Meds:     carvedilol, 25 mg, Oral, BID With Meals  Delflex-LC/2.5% Dextrose, 1.5 L, Intraperitoneal, 6 Exchanges Daily (Q4H)  docusate sodium, 100 mg, Oral, BID  mycophenolate, 250 mg, Oral, Q12H  senna-docusate sodium, 2 tablet, Oral, BID  sevelamer, 1,600 mg, Oral, TID With Meals  sodium chloride, 10 mL, Intravenous, Q12H      IV Meds:        Results Reviewed:   I have personally reviewed the results from the  time of this admission to 10/31/2023 10:04 EDT     Results from last 7 days   Lab Units 10/31/23  0520 10/30/23  0540 10/29/23  0645 10/26/23  0314 10/26/23  0054   SODIUM mmol/L 125* 128* 123*   < > 122*   POTASSIUM mmol/L 3.8 4.0 4.8   < > 3.8   CHLORIDE mmol/L 90* 94* 89*   < > 82*   CO2 mmol/L 23.1 23.0 21.7*   < > 22.0   BUN mg/dL 48* 56* 34*   < > 47*   CREATININE mg/dL 7.17* 7.22* 8.04*   < > 9.75*   CALCIUM mg/dL 8.2* 8.5* 8.1*   < > 9.9   BILIRUBIN mg/dL  --   --   --   --  0.8   ALK PHOS U/L  --   --   --   --  116   ALT (SGPT) U/L  --   --   --   --  71*   AST (SGOT) U/L  --   --   --   --  61*   GLUCOSE mg/dL 126* 120* 86   < > 101*    < > = values in this interval not displayed.       Estimated Creatinine Clearance: 9.4 mL/min (A) (by C-G formula based on SCr of 7.17 mg/dL (H)).    Results from last 7 days   Lab Units 10/31/23  0520 10/30/23  0540 10/29/23  0645   MAGNESIUM mg/dL 1.6 1.5*  --    PHOSPHORUS mg/dL 3.1 3.2 3.1             Results from last 7 days   Lab Units 10/31/23  0520 10/30/23  0540 10/29/23  0645 10/28/23  0551 10/27/23  0650   WBC 10*3/mm3 2.57* 3.57 4.10 5.02 3.48   HEMOGLOBIN g/dL 11.3* 10.8* 10.9* 10.9* 10.6*   PLATELETS 10*3/mm3 85* 93* 120* 102* 78*       Results from last 7 days   Lab Units 10/31/23  0520 10/26/23  0054   INR  1.05 1.15*       Assessment / Plan     ASSESSMENT:  End-stage renal disease on secondary to lupus nephritis on peritoneal dialysis, I will increase her dialysate to 2.5% to help removing more free water.  Severe hypertension on admission improved significantly associate with noncompliance  Hyponatremia associated with excessive water intake, sodium today is 125  Cardiomyopathy ejection fraction about 40%  Valvular heart disease with severe aortic insufficiency with mobile density on the aortic valve, she had CHETNA today she had negative blood culture  Thrombocytopenia, platelet 85,000  Anemia of chronic kidney disease hemoglobin 11.3  SLE.  Mitral and  aortic valve insufficiency with DeBakey type I dissection which is felt to be either subacute or chronic with plan for surgical intervention in the next 48 hours.    PLAN:  Change dialysate to 2.5% solution, 2 L each exchange, to help removing more free water.  Hopefully that will improve the hyponatremia  We will plan to do temporary hemodialysis in the postoperative.  By placing a temporary dialysis catheter since PD is not very effective in this setting.  will restrict fluid intake to 1000 cc per 24 hours  Surveillance labs    I reviewed the chart and other providers note, I reviewed imaging and lab data.  I discussed the case with the patient  Copied text in this note has been reviewed and is accurate as of 10/31/23.       Thank you for involving us in the care of Dee Herrera.  Please feel free to call with any questions.    Isaac Diaz MD  10/31/23  10:04 EDT    [facility]  [phone]    Please note that portions of this note were completed with a voice recognition program.

## 2023-11-01 ENCOUNTER — APPOINTMENT (OUTPATIENT)
Dept: CARDIOLOGY | Facility: HOSPITAL | Age: 33
DRG: 219 | End: 2023-11-01
Payer: MEDICARE

## 2023-11-01 ENCOUNTER — APPOINTMENT (OUTPATIENT)
Dept: GENERAL RADIOLOGY | Facility: HOSPITAL | Age: 33
DRG: 219 | End: 2023-11-01
Payer: MEDICARE

## 2023-11-01 ENCOUNTER — ANESTHESIA EVENT (OUTPATIENT)
Dept: PERIOP | Facility: HOSPITAL | Age: 33
End: 2023-11-01
Payer: MEDICARE

## 2023-11-01 LAB
ABO GROUP BLD: NORMAL
ALBUMIN SERPL-MCNC: 2.7 G/DL (ref 3.5–5.2)
ANION GAP SERPL CALCULATED.3IONS-SCNC: 14 MMOL/L (ref 5–15)
BACTERIA SPEC AEROBE CULT: NORMAL
BACTERIA SPEC AEROBE CULT: NORMAL
BASOPHILS # BLD AUTO: 0.02 10*3/MM3 (ref 0–0.2)
BASOPHILS NFR BLD AUTO: 1 % (ref 0–1.5)
BH CV ECHO LEFT VENTRICLE GLOBAL LONGITUDINAL STRAIN: -18.6 %
BH CV ECHO MEAS - AI P1/2T: 330.3 MSEC
BH CV ECHO MEAS - EDV(MOD-SP2): 114 ML
BH CV ECHO MEAS - EDV(MOD-SP4): 178 ML
BH CV ECHO MEAS - EF(MOD-BP): 59.7 %
BH CV ECHO MEAS - EF(MOD-SP2): 56.1 %
BH CV ECHO MEAS - EF(MOD-SP4): 60.7 %
BH CV ECHO MEAS - ESV(MOD-SP2): 50 ML
BH CV ECHO MEAS - ESV(MOD-SP4): 70 ML
BH CV ECHO MEAS - LV DIASTOLIC VOL/BSA (35-75): 113.9 CM2
BH CV ECHO MEAS - LV SYSTOLIC VOL/BSA (12-30): 44.8 CM2
BH CV ECHO MEAS - SI(MOD-SP2): 40.9 ML/M2
BH CV ECHO MEAS - SI(MOD-SP4): 69.1 ML/M2
BH CV ECHO MEAS - SV(MOD-SP2): 64 ML
BH CV ECHO MEAS - SV(MOD-SP4): 108 ML
BH CV XLRA MEAS LEFT DIST CCA EDV: -6.4 CM/SEC
BH CV XLRA MEAS LEFT DIST CCA PSV: -84.4 CM/SEC
BH CV XLRA MEAS LEFT DIST ICA EDV: -30.6 CM/SEC
BH CV XLRA MEAS LEFT DIST ICA PSV: -119 CM/SEC
BH CV XLRA MEAS LEFT ICA/CCA RATIO: 1.45
BH CV XLRA MEAS LEFT MID ICA EDV: -27.5 CM/SEC
BH CV XLRA MEAS LEFT MID ICA PSV: -122 CM/SEC
BH CV XLRA MEAS LEFT PROX CCA PSV: 86.2 CM/SEC
BH CV XLRA MEAS LEFT PROX ECA EDV: -7 CM/SEC
BH CV XLRA MEAS LEFT PROX ECA PSV: -81.5 CM/SEC
BH CV XLRA MEAS LEFT PROX ICA EDV: -11 CM/SEC
BH CV XLRA MEAS LEFT PROX ICA PSV: -40.1 CM/SEC
BH CV XLRA MEAS LEFT PROX SCLA PSV: 91.9 CM/SEC
BH CV XLRA MEAS LEFT VERTEBRAL A EDV: -14.1 CM/SEC
BH CV XLRA MEAS LEFT VERTEBRAL A PSV: -73.9 CM/SEC
BH CV XLRA MEAS RIGHT DIST CCA PSV: -103 CM/SEC
BH CV XLRA MEAS RIGHT DIST ICA EDV: -14.4 CM/SEC
BH CV XLRA MEAS RIGHT DIST ICA PSV: -111 CM/SEC
BH CV XLRA MEAS RIGHT ICA/CCA RATIO: 1.08
BH CV XLRA MEAS RIGHT MID ICA EDV: -18.8 CM/SEC
BH CV XLRA MEAS RIGHT MID ICA PSV: -101 CM/SEC
BH CV XLRA MEAS RIGHT PROX CCA PSV: -121 CM/SEC
BH CV XLRA MEAS RIGHT PROX ECA PSV: -74.6 CM/SEC
BH CV XLRA MEAS RIGHT PROX ICA EDV: -6.9 CM/SEC
BH CV XLRA MEAS RIGHT PROX ICA PSV: -41.4 CM/SEC
BH CV XLRA MEAS RIGHT PROX SCLA PSV: 90.4 CM/SEC
BH CV XLRA MEAS RIGHT VERTEBRAL A EDV: -9.4 CM/SEC
BH CV XLRA MEAS RIGHT VERTEBRAL A PSV: -62.7 CM/SEC
BLD GP AB SCN SERPL QL: NEGATIVE
BUN SERPL-MCNC: 34 MG/DL (ref 6–20)
BUN/CREAT SERPL: 5.3 (ref 7–25)
CALCIUM SPEC-SCNC: 8.9 MG/DL (ref 8.6–10.5)
CHLORIDE SERPL-SCNC: 89 MMOL/L (ref 98–107)
CO2 SERPL-SCNC: 24 MMOL/L (ref 22–29)
CREAT SERPL-MCNC: 6.45 MG/DL (ref 0.57–1)
DEPRECATED RDW RBC AUTO: 51.7 FL (ref 37–54)
EGFRCR SERPLBLD CKD-EPI 2021: 8.3 ML/MIN/1.73
EOSINOPHIL # BLD AUTO: 0.04 10*3/MM3 (ref 0–0.4)
EOSINOPHIL NFR BLD AUTO: 2 % (ref 0.3–6.2)
ERYTHROCYTE [DISTWIDTH] IN BLOOD BY AUTOMATED COUNT: 18 % (ref 12.3–15.4)
GLUCOSE BLDC GLUCOMTR-MCNC: 107 MG/DL (ref 70–130)
GLUCOSE BLDC GLUCOMTR-MCNC: 112 MG/DL (ref 70–130)
GLUCOSE BLDC GLUCOMTR-MCNC: 123 MG/DL (ref 70–130)
GLUCOSE SERPL-MCNC: 117 MG/DL (ref 65–99)
HCT VFR BLD AUTO: 40.1 % (ref 34–46.6)
HGB BLD-MCNC: 12.9 G/DL (ref 12–15.9)
LEFT ARM BP: NORMAL MMHG
LYMPHOCYTES # BLD AUTO: 0.39 10*3/MM3 (ref 0.7–3.1)
LYMPHOCYTES NFR BLD AUTO: 19 % (ref 19.6–45.3)
MAGNESIUM SERPL-MCNC: 1.6 MG/DL (ref 1.6–2.6)
MCH RBC QN AUTO: 26.8 PG (ref 26.6–33)
MCHC RBC AUTO-ENTMCNC: 32.2 G/DL (ref 31.5–35.7)
MCV RBC AUTO: 83.2 FL (ref 79–97)
MONOCYTES # BLD AUTO: 0.23 10*3/MM3 (ref 0.1–0.9)
MONOCYTES NFR BLD AUTO: 11.2 % (ref 5–12)
NEUTROPHILS NFR BLD AUTO: 1.36 10*3/MM3 (ref 1.7–7)
NEUTROPHILS NFR BLD AUTO: 66.3 % (ref 42.7–76)
PHOSPHATE SERPL-MCNC: 3.3 MG/DL (ref 2.5–4.5)
PLATELET # BLD AUTO: 85 10*3/MM3 (ref 140–450)
POTASSIUM SERPL-SCNC: 3.7 MMOL/L (ref 3.5–5.2)
QT INTERVAL: 446 MS
QTC INTERVAL: 468 MS
RBC # BLD AUTO: 4.82 10*6/MM3 (ref 3.77–5.28)
RH BLD: POSITIVE
RIGHT ARM BP: NORMAL MMHG
SODIUM SERPL-SCNC: 127 MMOL/L (ref 136–145)
T&S EXPIRATION DATE: NORMAL
WBC NRBC COR # BLD: 2.05 10*3/MM3 (ref 3.4–10.8)

## 2023-11-01 PROCEDURE — 93308 TTE F-UP OR LMTD: CPT | Performed by: INTERNAL MEDICINE

## 2023-11-01 PROCEDURE — P9100 PATHOGEN TEST FOR PLATELETS: HCPCS

## 2023-11-01 PROCEDURE — 82948 REAGENT STRIP/BLOOD GLUCOSE: CPT

## 2023-11-01 PROCEDURE — 93356 MYOCRD STRAIN IMG SPCKL TRCK: CPT

## 2023-11-01 PROCEDURE — 93325 DOPPLER ECHO COLOR FLOW MAPG: CPT

## 2023-11-01 PROCEDURE — P9035 PLATELET PHERES LEUKOREDUCED: HCPCS

## 2023-11-01 PROCEDURE — 80069 RENAL FUNCTION PANEL: CPT | Performed by: INTERNAL MEDICINE

## 2023-11-01 PROCEDURE — 93308 TTE F-UP OR LMTD: CPT

## 2023-11-01 PROCEDURE — 85025 COMPLETE CBC W/AUTO DIFF WBC: CPT | Performed by: INTERNAL MEDICINE

## 2023-11-01 PROCEDURE — 93005 ELECTROCARDIOGRAM TRACING: CPT | Performed by: NURSE PRACTITIONER

## 2023-11-01 PROCEDURE — 93325 DOPPLER ECHO COLOR FLOW MAPG: CPT | Performed by: INTERNAL MEDICINE

## 2023-11-01 PROCEDURE — 36430 TRANSFUSION BLD/BLD COMPNT: CPT

## 2023-11-01 PROCEDURE — 86901 BLOOD TYPING SEROLOGIC RH(D): CPT | Performed by: NURSE PRACTITIONER

## 2023-11-01 PROCEDURE — 93880 EXTRACRANIAL BILAT STUDY: CPT

## 2023-11-01 PROCEDURE — 99024 POSTOP FOLLOW-UP VISIT: CPT | Performed by: THORACIC SURGERY (CARDIOTHORACIC VASCULAR SURGERY)

## 2023-11-01 PROCEDURE — 71046 X-RAY EXAM CHEST 2 VIEWS: CPT

## 2023-11-01 PROCEDURE — 99232 SBSQ HOSP IP/OBS MODERATE 35: CPT | Performed by: INTERNAL MEDICINE

## 2023-11-01 PROCEDURE — 25010000002 ONDANSETRON PER 1 MG: Performed by: NURSE PRACTITIONER

## 2023-11-01 PROCEDURE — 93356 MYOCRD STRAIN IMG SPCKL TRCK: CPT | Performed by: INTERNAL MEDICINE

## 2023-11-01 PROCEDURE — 93010 ELECTROCARDIOGRAM REPORT: CPT | Performed by: INTERNAL MEDICINE

## 2023-11-01 PROCEDURE — 86900 BLOOD TYPING SEROLOGIC ABO: CPT | Performed by: NURSE PRACTITIONER

## 2023-11-01 PROCEDURE — 86923 COMPATIBILITY TEST ELECTRIC: CPT

## 2023-11-01 PROCEDURE — 83735 ASSAY OF MAGNESIUM: CPT | Performed by: INTERNAL MEDICINE

## 2023-11-01 PROCEDURE — 86850 RBC ANTIBODY SCREEN: CPT | Performed by: NURSE PRACTITIONER

## 2023-11-01 RX ADMIN — Medication 10 ML: at 21:19

## 2023-11-01 RX ADMIN — DEXTROSE MONOHYDRATE, SODIUM CHLORIDE, SODIUM LACTATE, CALCIUM CHLORIDE, MAGNESIUM CHLORIDE 2000 ML: 2.5; 538; 448; 18.4; 5.08 SOLUTION INTRAPERITONEAL at 21:17

## 2023-11-01 RX ADMIN — DEXTROSE MONOHYDRATE, SODIUM CHLORIDE, SODIUM LACTATE, CALCIUM CHLORIDE, MAGNESIUM CHLORIDE 2000 ML: 2.5; 538; 448; 18.4; 5.08 SOLUTION INTRAPERITONEAL at 13:19

## 2023-11-01 RX ADMIN — DEXTROSE MONOHYDRATE, SODIUM CHLORIDE, SODIUM LACTATE, CALCIUM CHLORIDE, MAGNESIUM CHLORIDE 2000 ML: 2.5; 538; 448; 18.4; 5.08 SOLUTION INTRAPERITONEAL at 05:20

## 2023-11-01 RX ADMIN — DEXTROSE MONOHYDRATE, SODIUM CHLORIDE, SODIUM LACTATE, CALCIUM CHLORIDE, MAGNESIUM CHLORIDE 2000 ML: 2.5; 538; 448; 18.4; 5.08 SOLUTION INTRAPERITONEAL at 00:32

## 2023-11-01 RX ADMIN — DEXTROSE MONOHYDRATE, SODIUM CHLORIDE, SODIUM LACTATE, CALCIUM CHLORIDE, MAGNESIUM CHLORIDE 2000 ML: 2.5; 538; 448; 18.4; 5.08 SOLUTION INTRAPERITONEAL at 16:36

## 2023-11-01 RX ADMIN — CHLORHEXIDINE GLUCONATE 1 APPLICATION: 500 CLOTH TOPICAL at 21:34

## 2023-11-01 RX ADMIN — CARVEDILOL 25 MG: 25 TABLET, FILM COATED ORAL at 17:51

## 2023-11-01 RX ADMIN — DEXTROSE MONOHYDRATE, SODIUM CHLORIDE, SODIUM LACTATE, CALCIUM CHLORIDE, MAGNESIUM CHLORIDE 2000 ML: 2.5; 538; 448; 18.4; 5.08 SOLUTION INTRAPERITONEAL at 09:27

## 2023-11-01 RX ADMIN — 0.12% CHLORHEXIDINE GLUCONATE 15 ML: 1.2 RINSE ORAL at 21:34

## 2023-11-01 RX ADMIN — ONDANSETRON 4 MG: 2 INJECTION INTRAMUSCULAR; INTRAVENOUS at 09:47

## 2023-11-01 RX ADMIN — MUPIROCIN 1 APPLICATION: 20 OINTMENT TOPICAL at 21:34

## 2023-11-01 NOTE — PLAN OF CARE
Goal Outcome Evaluation:      Pt resting in bed. No c/o voiced. PD completed w/o incidence per MAR. VSS No s/s of distress.

## 2023-11-01 NOTE — PLAN OF CARE
Problem: Adult Inpatient Plan of Care  Goal: Plan of Care Review  Outcome: Ongoing, Progressing  Flowsheets (Taken 11/1/2023 1800)  Plan of Care Reviewed With: patient  Outcome Evaluation: [place] [employer] well, exchanges q4h. Denies pain or SOA. Appetite poor, ate a little better w/ take-out food. Prep/testing in progress for cardiac surgery in am.  Goal: Patient-Specific Goal (Individualized)  Outcome: Ongoing, Progressing  Goal: Absence of Hospital-Acquired Illness or Injury  Outcome: Ongoing, Progressing  Intervention: Identify and Manage Fall Risk  Recent Flowsheet Documentation  Taken 11/1/2023 1600 by Celeste Woodruff, RN  Safety Promotion/Fall Prevention:   fall prevention program maintained   safety round/check completed  Taken 11/1/2023 1445 by Celeste Woodruff RN  Safety Promotion/Fall Prevention:   fall prevention program maintained   safety round/check completed  Taken 11/1/2023 1200 by Celeste Woodruff RN  Safety Promotion/Fall Prevention:   fall prevention program maintained   safety round/check completed  Taken 11/1/2023 1030 by Celeste Woodruff RN  Safety Promotion/Fall Prevention:   fall prevention program maintained   safety round/check completed  Taken 11/1/2023 0940 by Celeste Woodruff RN  Safety Promotion/Fall Prevention:   fall prevention program maintained   safety round/check completed  Taken 11/1/2023 0800 by Celeste Woodruff RN  Safety Promotion/Fall Prevention:   fall prevention program maintained   safety round/check completed  Goal: Optimal Comfort and Wellbeing  Outcome: Ongoing, Progressing  Goal: Readiness for Transition of Care  Outcome: Ongoing, Progressing     Problem: Skin Injury Risk Increased  Goal: Skin Health and Integrity  Outcome: Ongoing, Progressing     Problem: Malnutrition  Goal: Improved Nutritional Intake  Outcome: Ongoing, Progressing     Problem: Fall Injury Risk  Goal: Absence of Fall and Fall-Related Injury  Outcome: Ongoing, Progressing  Intervention: Promote  Injury-Free Environment  Recent Flowsheet Documentation  Taken 11/1/2023 1600 by Celeste Woodruff, RN  Safety Promotion/Fall Prevention:   fall prevention program maintained   safety round/check completed  Taken 11/1/2023 1445 by Celeste Woodruff, RN  Safety Promotion/Fall Prevention:   fall prevention program maintained   safety round/check completed  Taken 11/1/2023 1200 by Celeste Woodruff RN  Safety Promotion/Fall Prevention:   fall prevention program maintained   safety round/check completed  Taken 11/1/2023 1030 by Celeste Woodruff RN  Safety Promotion/Fall Prevention:   fall prevention program maintained   safety round/check completed  Taken 11/1/2023 0940 by Celeste Woodruff RN  Safety Promotion/Fall Prevention:   fall prevention program maintained   safety round/check completed  Taken 11/1/2023 0800 by Celeste Woodruff RN  Safety Promotion/Fall Prevention:   fall prevention program maintained   safety round/check completed     Problem: Electrolyte Imbalance  Goal: Electrolyte Balance  Outcome: Ongoing, Progressing   Goal Outcome Evaluation:  Plan of Care Reviewed With: patient           Outcome Evaluation: [place] [employer] well, exchanges q4h. Denies pain or SOA. Appetite poor, ate a little better w/ take-out food. Prep/testing in progress for cardiac surgery in am.

## 2023-11-01 NOTE — PROGRESS NOTES
[facility] Medicine Services  PROGRESS NOTE    Patient Name: Dee Herrera  : 1990  MRN: [id number]    Date of Admission: 10/26/2023  Primary Care Physician: Margarita Woods APRN    Subjective   Subjective     CC:  Follow-up low sodium    Subjective:  Patient is still having poor appetite.  After counseling she is agreeable to try supplements today.  She continues to be ready for surgery tentatively scheduled for tomorrow.    Review of Systems  No current fevers or chills  No current shortness of breath or cough  No current nausea, vomiting, or diarrhea      Objective   Objective     Vital Signs:   Temp:  [97.4 °F (36.3 °C)-98.4 °F (36.9 °C)] 98.1 °F (36.7 °C)  Heart Rate:  [68-76] 76  Resp:  [16-18] 16  BP: ()/(54-68) 107/60        Physical Exam:  Constitutional:Awake, alert  HENT: NCAT, mucous membranes moist, neck supple  Respiratory: No cough or wheezing, nonlabored breathing on room air oxygen  Cardiovascular: Pulse rate is normal, normal radial pulses  Gastrointestinal:   soft, nontender, nondistended  Musculoskeletal: Thin frail and chronically debilitated appearance, BMI is 19, minimal lower extremity edema  Psychiatric: Reasonably calm affect, cooperative, conversational  Neurologic: No slurred speech or facial droop, follows commands  Skin: No rashes or jaundice, warm      Results Reviewed:  Results from last 7 days   Lab Units 23  0557 10/31/23  0520 10/30/23  0540 10/26/23  0649 10/26/23  0054   WBC 10*3/mm3 2.05* 2.57* 3.57   < > 4.49   HEMOGLOBIN g/dL 12.9 11.3* 10.8*   < > 11.1*   HEMATOCRIT % 40.1 35.2 33.4*   < > 34.8   PLATELETS 10*3/mm3 85* 85* 93*   < > 121*   INR   --  1.05  --   --  1.15*    < > = values in this interval not displayed.     Results from last 7 days   Lab Units 23  0557 10/31/23  0520 10/30/23  1511 10/30/23  0540 10/26/23  1256 10/26/23  0649 10/26/23  0314 10/26/23  0054   SODIUM mmol/L 127* 125*  --  128*   < > 122* 122* 122*   POTASSIUM  mmol/L 3.7 3.8  --  4.0   < > 4.0 4.0 3.8   CHLORIDE mmol/L 89* 90*  --  94*   < > 82* 82* 82*   CO2 mmol/L 24.0 23.1  --  23.0   < > 19.0* 18.0* 22.0   BUN mg/dL 34* 48*  --  56*   < > 50* 49* 47*   CREATININE mg/dL 6.45* 7.17*  --  7.22*   < > 9.72* 9.48* 9.75*   GLUCOSE mg/dL 117* 126*  --  120*   < > 104* 95 101*   CALCIUM mg/dL 8.9 8.2*  --  8.5*   < > 9.6 9.8 9.9   ALK PHOS U/L  --   --   --   --   --   --   --  116   ALT (SGPT) U/L  --   --   --   --   --   --   --  71*   AST (SGOT) U/L  --   --   --   --   --   --   --  61*   HSTROP T ng/L  --   --   --   --   --  117* 123* 130*   PROBNP pg/mL  --   --  >70,000.0*  --   --   --   --  >70,000.0*    < > = values in this interval not displayed.     Estimated Creatinine Clearance: 10.4 mL/min (A) (by C-G formula based on SCr of 6.45 mg/dL (H)).    Microbiology Results Abnormal       Procedure Component Value - Date/Time    COVID PRE-OP / PRE-PROCEDURE SCREENING ORDER (NO ISOLATION) - Swab, Nasopharynx [[id number]]  (Normal) Collected: 10/31/23 1939    Lab Status: Final result Specimen: Swab from Nasopharynx Updated: 10/31/23 2042    Narrative:      The following orders were created for panel order COVID PRE-OP / PRE-PROCEDURE SCREENING ORDER (NO ISOLATION) - Swab, Nasopharynx.  Procedure                               Abnormality         Status                     ---------                               -----------         ------                     [facility] IN-HOUSE...[[id number]]  Normal              Final result                 Please view results for these tests on the individual orders.    COVID-19,[facility] IN-HOUSE CEPHEID/CRYSTAL NP SWAB IN TRANSPORT MEDIA 8-12 HR TAT - Swab, Nasopharynx [[id number]]  (Normal) Collected: 10/31/23 1939    Lab Status: Final result Specimen: Swab from Nasopharynx Updated: 10/31/23 2042     COVID19 Not Detected    Narrative:      Fact sheet for providers: https://www.fda.gov/media/815020/download    Fact sheet for patients:  https://www.fda.gov/media/629075/download    Test performed by PCR.    Blood Culture - Blood, Arm, Left [[id number]]  (Normal) Collected: 10/27/23 1652    Lab Status: Preliminary result Specimen: Blood from Arm, Left Updated: 10/31/23 1715     Blood Culture No growth at 4 days    Blood Culture - Blood, Arm, Right [[id number]]  (Normal) Collected: 10/27/23 1521    Lab Status: Preliminary result Specimen: Blood from Arm, Right Updated: 10/31/23 1600     Blood Culture No growth at 4 days    Respiratory Panel PCR w/COVID-19(SARS-CoV-2) [facility] In-House, NP Swab in UTM/VTM, 3-4 HR TAT - Swab, Nasopharynx [[id number]]  (Normal) Collected: 10/26/23 0057    Lab Status: Final result Specimen: Swab from Nasopharynx Updated: 10/26/23 0152     ADENOVIRUS, PCR Not Detected     Coronavirus 229E Not Detected     Coronavirus HKU1 Not Detected     Coronavirus NL63 Not Detected     Coronavirus OC43 Not Detected     COVID19 Not Detected     Human Metapneumovirus Not Detected     Human Rhinovirus/Enterovirus Not Detected     Influenza A PCR Not Detected     Influenza B PCR Not Detected     Parainfluenza Virus 1 Not Detected     Parainfluenza Virus 2 Not Detected     Parainfluenza Virus 3 Not Detected     Parainfluenza Virus 4 Not Detected     RSV, PCR Not Detected     Bordetella pertussis pcr Not Detected     Bordetella parapertussis PCR Not Detected     Chlamydophila pneumoniae PCR Not Detected     Mycoplasma pneumo by PCR Not Detected    Narrative:      In the setting of a positive respiratory panel with a viral infection PLUS a negative procalcitonin without other underlying concern for bacterial infection, consider observing off antibiotics or discontinuation of antibiotics and continue supportive care. If the respiratory panel is positive for atypical bacterial infection (Bordetella pertussis, Chlamydophila pneumoniae, or Mycoplasma pneumoniae), consider antibiotic de-escalation to target atypical bacterial  infection.            Imaging Results (Last 24 Hours)       ** No results found for the last 24 hours. **            Results for orders placed during the hospital encounter of 10/26/23    Adult Transesophageal Echo 3D (CHETNA) W/ Cont If Necessary Per Protocol    Interpretation Summary    Left ventricular ejection fraction appears to be 56 - 60%.    The left ventricular cavity is mildly dilated.    Left ventricular wall thickness is consistent with mild concentric hypertrophy.    Left ventricular diastolic function was not assessed.    Saline test results are negative.    Severe aortic valve regurgitation is present.    Moderate dilation of the aortic root is present. No dilation of the ascending aorta is present. There is an aortic root dissection      I have reviewed the medications:  Scheduled Meds:carvedilol, 25 mg, Oral, BID With Meals  [START ON 11/2/2023] ceFAZolin, 2,000 mg, Intravenous, On Call to OR  chlorhexidine, 15 mL, Mouth/Throat, Q12H  Chlorhexidine Gluconate Cloth, 1 application , Topical, Q12H  Delflex-LC/2.5% Dextrose, 2,000 mL, Intraperitoneal, 6 Exchanges Daily (Q4H)  docusate sodium, 100 mg, Oral, BID  [START ON 11/2/2023] metoprolol tartrate, 12.5 mg, Oral, On Call to OR  mupirocin, 1 application , Each Nare, Q12H  senna-docusate sodium, 2 tablet, Oral, BID  sevelamer, 1,600 mg, Oral, TID With Meals  sodium chloride, 10 mL, Intravenous, Q12H      Continuous Infusions:   PRN Meds:.  acetaminophen **OR** acetaminophen **OR** acetaminophen    senna-docusate sodium **AND** polyethylene glycol **AND** bisacodyl **AND** bisacodyl    calcium carbonate    labetalol    nitroglycerin    ondansetron **OR** ondansetron    prochlorperazine **OR** prochlorperazine **OR** prochlorperazine    [COMPLETED] Insert Peripheral IV **AND** sodium chloride    sodium chloride    sodium chloride    traMADol    Assessment & Plan   Assessment & Plan     Active Hospital Problems    Diagnosis  POA    **Dissecting ascending  aortic aneurysm [I71.010]  Yes    Aortic valve lesion [I35.8]  Yes    Severe aortic valve regurgitation [I35.1]  Yes    Hyponatremia [E87.1]  Yes    Poor appetite [R63.0]  Yes    Moderate malnutrition [E44.0]  Yes    Chronic diastolic CHF (congestive heart failure) [I50.32]  Yes    Anemia due to chronic kidney disease, on chronic dialysis [N18.6, D63.1, Z99.2]  Not Applicable    Peritoneal dialysis catheter in place [Z99.2]  Not Applicable    Essential hypertension [I10]  Yes    End stage renal disease on dialysis [N18.6, Z99.2]  Not Applicable    Seizure disorder [G40.909]  Yes    Elevated liver function tests [R79.89]  Yes    Pericardial effusion [I31.39]  Yes    Systemic lupus erythematosus [M32.9]  Yes    Thrombocytopenia [D69.6]  Yes    Hypertension secondary to other renal disorders [I15.1]  Yes    Pancytopenia [D61.818]  Yes    Vitamin D deficiency [E55.9]  Yes      Resolved Hospital Problems    Diagnosis Date Resolved POA    Abdominal pain [R10.9] 10/28/2023 Yes        Brief Hospital Course to date:  Dee Herrera is a 33 y.o. female presents to the hospital with weakness and shortness of breath found to have severe hyponatremia.     Discussion/plan for today:  Nutrition continues to be an issue.  I have added nutritional supplements and strongly trying to encourage patient to increase calorie and protein intake preoperatively.  Transfuse platelets this evening preoperatively for surgery.  Fluid restriction for hyponatremia.  Counseled patient's about the importance of fluid restriction as she will need a certain sodium level for anesthesia tomorrow.  Plan to discuss this with anesthesiology today.    Ascending aortic dissection and concern for connective tissue disorder:  Seen on CHETNA.  Cardiothoracic surgery recommended surgical intervention    Hyponatremia:  Trend sodium level.  Salt tablets given and now discontinued. Nephrology adjusting peritoneal dialysis.  Monitor for any associated issues with  metabolic problem.  Fluid restriction.  Nephrology adjusting PD     Hypertensive urgency with essential hypertension:  Initially severely elevated blood pressure.  Possible noncompliance with home medications or dialysis.  Blood pressure improved.  Stop calcium channel blocker for now.  Blood pressure is quite labile.  Labetalol as needed for greater than 180.     End-stage renal disease on peritoneal dialysis:  Nephrology consult.  Dialyze as needed.  Electrolytes reviewed and patient has metabolic acidosis.     Chronic diastolic heart failure with history of pericardial effusion:  Continue home cardiac medications.  Consult cardiology, sees Dr. Taylor.  CHF currently compensated.  No current effusion.     Malnutrition and poor appetite:  Zofran as needed for nausea.  Nutrition consult and maximize nutrition is much as needed.  Continue to encourage diet and oral intake.  Nutrition supplements     Chronic abdominal pain: Possibly related to heart failure, electrolyte abnormalities, or pulmonary hypertension.  Chronic issue now symptoms resolved.  Abdominal exam is benign.     Thrombocytopenia: Chronic issue.  No bleeding noted.  Platelets preoperatively.     Lupus:, Immunosuppressed: Hold Plaquenil poor cardiothoracic recommendations, continue CellCept.  Monitor carefully     Anemia of chronic kidney disease: Trend hemoglobin.     Seizure disorder: Denies any recent seizures for several years.  Monitor.      DVT Prophylaxis: Mechanical      Disposition: Likely home pending clinical course    CODE STATUS:   Code Status and Medical Interventions:   Ordered at: 10/26/23 0434     Code Status (Patient has no pulse and is not breathing):    CPR (Attempt to Resuscitate)     Medical Interventions (Patient has pulse or is breathing):    Full Support       Mike Lezama MD  11/01/23

## 2023-11-01 NOTE — PROGRESS NOTES
[facility] Progress Note      Patient Name: Dee Herrera  : 1990  MRN: [id number]  Primary Care Physician:  Margarita Woods, CAESAR  Date of admission: 10/26/2023    Subjective     Interval History:   Follow-up end-stage renal disease on peritoneal dialysis    She has severe aortic insufficiency and mitral insufficiency and was found to have dissection of the aortic root and most likely it is subacute if not chronic, has been evaluated by cardiovascular surgery with plan for surgical intervention on 2023  She denies any chest pain or shortness of air, no orthopnea or PND, no nausea or vomiting, tolerating her peritoneal dialysis.  And complaining of significant thirst    Review of Systems:   As noted above    Objective     Vitals:   Temp:  [97.4 °F (36.3 °C)-98.4 °F (36.9 °C)] 98.1 °F (36.7 °C)  Heart Rate:  [68-74] 74  Resp:  [16-18] 16  BP: ()/(54-68) 96/54    Intake/Output Summary (Last 24 hours) at 2023 0831  Last data filed at 2023 0600  Gross per 24 hour   Intake 42099 ml   Output 77294 ml   Net -2350 ml       Physical Exam:    General Appearance: Awake, alert, chronically ill and frail  Skin: warm and dry  HEENT: Oral mucosa is dry  Neck: supple, no JVD  Lungs: CTA, unlabored breathing effort  Heart: RRR, normal S1 and S2, no rub  Abdomen: soft, nontender, nondistended, normoactive bowels and PD catheter in place with clean exit site  Extremities: no edema, cyanosis or clubbing  Neuro: normal speech and mental status     Scheduled Meds:     carvedilol, 25 mg, Oral, BID With Meals  [START ON 2023] ceFAZolin, 2,000 mg, Intravenous, On Call to OR  chlorhexidine, 15 mL, Mouth/Throat, Q12H  Chlorhexidine Gluconate Cloth, 1 application , Topical, Q12H  Delflex-LC/2.5% Dextrose, 2,000 mL, Intraperitoneal, 6 Exchanges Daily (Q4H)  docusate sodium, 100 mg, Oral, BID  [START ON 2023] metoprolol tartrate, 12.5 mg, Oral, On Call to OR  mupirocin, 1  application , Each Nare, Q12H  senna-docusate sodium, 2 tablet, Oral, BID  sevelamer, 1,600 mg, Oral, TID With Meals  sodium chloride, 10 mL, Intravenous, Q12H      IV Meds:        Results Reviewed:   I have personally reviewed the results from the time of this admission to 11/1/2023 08:31 EDT     Results from last 7 days   Lab Units 11/01/23  0557 10/31/23  0520 10/30/23  0540 10/26/23  0314 10/26/23  0054   SODIUM mmol/L 127* 125* 128*   < > 122*   POTASSIUM mmol/L 3.7 3.8 4.0   < > 3.8   CHLORIDE mmol/L 89* 90* 94*   < > 82*   CO2 mmol/L 24.0 23.1 23.0   < > 22.0   BUN mg/dL 34* 48* 56*   < > 47*   CREATININE mg/dL 6.45* 7.17* 7.22*   < > 9.75*   CALCIUM mg/dL 8.9 8.2* 8.5*   < > 9.9   BILIRUBIN mg/dL  --   --   --   --  0.8   ALK PHOS U/L  --   --   --   --  116   ALT (SGPT) U/L  --   --   --   --  71*   AST (SGOT) U/L  --   --   --   --  61*   GLUCOSE mg/dL 117* 126* 120*   < > 101*    < > = values in this interval not displayed.       Estimated Creatinine Clearance: 10.4 mL/min (A) (by C-G formula based on SCr of 6.45 mg/dL (H)).    Results from last 7 days   Lab Units 11/01/23  0557 10/31/23  0520 10/30/23  0540   MAGNESIUM mg/dL 1.6 1.6 1.5*   PHOSPHORUS mg/dL 3.3 3.1 3.2             Results from last 7 days   Lab Units 11/01/23  0557 10/31/23  0520 10/30/23  0540 10/29/23  0645 10/28/23  0551   WBC 10*3/mm3 2.05* 2.57* 3.57 4.10 5.02   HEMOGLOBIN g/dL 12.9 11.3* 10.8* 10.9* 10.9*   PLATELETS 10*3/mm3 85* 85* 93* 120* 102*       Results from last 7 days   Lab Units 10/31/23  0520 10/26/23  0054   INR  1.05 1.15*       Assessment / Plan     ASSESSMENT:  End-stage renal disease on secondary to lupus nephritis on peritoneal dialysis, currently on peritoneal dialysis appears to be euvolemic   severe hypertension on admission improved significantly associate with noncompliance, blood pressure improved significantly  Hyponatremia associated with excessive water intake, sodium today is up to 127  Cardiomyopathy  ejection fraction about 40%  Valvular heart disease with severe aortic insufficiency with mobile density on the aortic valve, she had CHETNA today she had negative blood culture  Thrombocytopenia, platelet 85,000  Anemia of chronic kidney disease hemoglobin 12.9  SLE.  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    PLAN:  Continue the same treatment and the same dialysate volume  We will plan to do temporary hemodialysis in the postoperative.  By placing a temporary dialysis catheter since PD is not very effective in this setting.  Continue fluid restriction  Surveillance labs  In regard for needing long-term warfarin therapy postoperatively in case she needed a mechanical valve that should not impact her chances of kidney transplant.  I discussed the case at length with Dr. Medina    I reviewed the chart and other providers note, I reviewed imaging and lab data.  I discussed the case with the patient and I called her mother over the phone  Copied text in this note has been reviewed and is accurate as of 11/01/23.       Thank you for involving us in the care of Dee Herrera.  Please feel free to call with any questions.    Isaac Diaz MD  11/01/23  08:31 EDT    [facility]  [phone]    Please note that portions of this note were completed with a voice recognition program.

## 2023-11-01 NOTE — PROGRESS NOTES
[facility] Follow Up    Chief Complaint: Follow up aortic aneurysm with dissection, aortic valve regurgitation     Interval History: Denies any chest pain or shortness of breath.    Objective:     Objective:  Temp:  [97.4 °F (36.3 °C)-98.4 °F (36.9 °C)] 98.1 °F (36.7 °C)  Heart Rate:  [68-74] 74  Resp:  [16-18] 16  BP: ()/(54-68) 96/54     Intake/Output Summary (Last 24 hours) at 11/1/2023 0737  Last data filed at 11/1/2023 0600  Gross per 24 hour   Intake 54710 ml   Output 82263 ml   Net -2350 ml     Body mass index is 19.15 kg/m².      10/26/23  0012 10/27/23  1127 10/30/23  1008   Weight: 52.2 kg (115 lb) 52.2 kg (115 lb) 52.2 kg (115 lb 1.3 oz)     Weight change:       Physical Exam:   General : Alert, cooperative, in no acute distress.  Neuro: Alert,cooperative and oriented.  Lungs: CTAB. Normal respiratory effort and rate.  CV: Regular rate and rhythm, normal S1 and S2, 4/6 diastolic murmur  ABD: Soft, nontender, nondistended. Positive bowel sounds.  Extr: No edema or cyanosis, moves all extremities.    Lab Review:   Results from last 7 days   Lab Units 11/01/23  0557 10/31/23  0520 10/26/23  0314 10/26/23  0054   SODIUM mmol/L 127* 125*   < > 122*   POTASSIUM mmol/L 3.7 3.8   < > 3.8   CHLORIDE mmol/L 89* 90*   < > 82*   CO2 mmol/L 24.0 23.1   < > 22.0   BUN mg/dL 34* 48*   < > 47*   CREATININE mg/dL 6.45* 7.17*   < > 9.75*   GLUCOSE mg/dL 117* 126*   < > 101*   CALCIUM mg/dL 8.9 8.2*   < > 9.9   AST (SGOT) U/L  --   --   --  61*   ALT (SGPT) U/L  --   --   --  71*    < > = values in this interval not displayed.     Results from last 7 days   Lab Units 10/26/23  0649 10/26/23  0314 10/26/23  0054   HSTROP T ng/L 117* 123* 130*     Results from last 7 days   Lab Units 11/01/23  0557 10/31/23  0520   WBC 10*3/mm3 2.05* 2.57*   HEMOGLOBIN g/dL 12.9 11.3*   HEMATOCRIT % 40.1 35.2   PLATELETS 10*3/mm3 85* 85*     Results from last 7 days   Lab Units 10/31/23  0520 10/26/23  0054   INR   "1.05 1.15*   APTT seconds 29.1 29.0     Results from last 7 days   Lab Units 11/01/23  0557 10/31/23  0520   MAGNESIUM mg/dL 1.6 1.6           Invalid input(s): \"LDLCALC\"  Results from last 7 days   Lab Units 10/30/23  1511 10/26/23  0054   PROBNP pg/mL >70,000.0* >70,000.0*         I reviewed the patient's new clinical results.  I personally viewed and interpreted the patient's EKG  Current Medications:   Scheduled Meds:carvedilol, 25 mg, Oral, BID With Meals  [START ON 11/2/2023] ceFAZolin, 2,000 mg, Intravenous, On Call to OR  chlorhexidine, 15 mL, Mouth/Throat, Q12H  Chlorhexidine Gluconate Cloth, 1 application , Topical, Q12H  Delflex-LC/2.5% Dextrose, 2,000 mL, Intraperitoneal, 6 Exchanges Daily (Q4H)  docusate sodium, 100 mg, Oral, BID  [START ON 11/2/2023] metoprolol tartrate, 12.5 mg, Oral, On Call to OR  mupirocin, 1 application , Each Nare, Q12H  senna-docusate sodium, 2 tablet, Oral, BID  sevelamer, 1,600 mg, Oral, TID With Meals  sodium chloride, 10 mL, Intravenous, Q12H      Continuous Infusions:     Allergies:  Allergies   Allergen Reactions    Minoxidil Other (See Comments) and Hives     Pericardial effusion .       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  Tentative plan for surgery Thursday morning.  Severe aortic valve regurgitation.  For surgery later this week as above.  Hopefully it can be repaired but may require valve replacement.  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  On peritoneal dialysis.  Hypovolemic acute hyponatremia.  Remains low but stable.  Hypertension. BP currently well controlled.   Chronic anemia. Stable.   Systemic lupus erythematosus. Plaquenil on hold in anticipation of surgery. Remains on mycophenolate.  Thrombocytopenia.  Chronic and appears to be around baseline.     -Continue current dose of carvedilol.  - Plan for surgery tomorrow for ascending aortic aneurysm with dissection and severe aortic valve " regurgitation.    Juana Taylor MD  11/01/23  07:37 EDT

## 2023-11-01 NOTE — PROGRESS NOTES
" LOS: 6 days   Patient Care Team:  Margarita Woods APRN as PCP - General (Nurse Practitioner)  Winnie Sanchez MD as Referring Physician (Obstetrics and Gynecology)  Norberto Almaraz MD PhD as Consulting Physician (Hematology and Oncology)  Lupis Thomas MD as Consulting Physician (Nephrology)  Hair Mckeon MD as Consulting Physician (Nephrology)  Juana Taylor MD as Consulting Physician (Cardiology)    Chief Complaint: aortic insufficiency    Subjective:  Symptoms:  No shortness of breath, cough or chest pain.    Diet:  Adequate intake.  No nausea or vomiting.    Activity level: Returning to normal.    Pain:  She reports no pain.      Vital Signs  Temp:  [97.4 °F (36.3 °C)-98.4 °F (36.9 °C)] 98.1 °F (36.7 °C)  Heart Rate:  [68-74] 74  Resp:  [16-18] 16  BP: ()/(54-68) 107/60  Body mass index is 19.15 kg/m².    Intake/Output Summary (Last 24 hours) at 11/1/2023 0932  Last data filed at 11/1/2023 0600  Gross per 24 hour   Intake 07504 ml   Output 05186 ml   Net -1750 ml     No intake/output data recorded.          10/26/23  0012 10/27/23  1127 10/30/23  1008   Weight: 52.2 kg (115 lb) 52.2 kg (115 lb) 52.2 kg (115 lb 1.3 oz)         Objective:  General Appearance:  Comfortable and in no acute distress.    Vital signs: (most recent): Blood pressure 107/60, pulse 76, temperature 98.1 °F (36.7 °C), temperature source Oral, resp. rate 16, height 165.1 cm (65\"), weight 52.2 kg (115 lb 1.3 oz), last menstrual period 08/10/2022, SpO2 100%, not currently breastfeeding.  Vital signs are normal.  No fever.    Output: No urine output.    Lungs:  Normal effort and normal respiratory rate.  There are decreased breath sounds.    Heart: Normal rate.  Regular rhythm.  Positive for murmur.    Abdomen: Abdomen is soft.  (PD catheter in place).  Bowel sounds are normal.     Extremities: There is no dependent edema.    Pulses: Distal pulses are intact.    Neurological: Patient is alert and oriented to person, " place and time.    Skin:  Warm and dry.            Results Review:        WBC WBC   Date Value Ref Range Status   11/01/2023 2.05 (L) 3.40 - 10.80 10*3/mm3 Final   10/31/2023 2.57 (L) 3.40 - 10.80 10*3/mm3 Final   10/30/2023 3.57 3.40 - 10.80 10*3/mm3 Final      HGB Hemoglobin   Date Value Ref Range Status   11/01/2023 12.9 12.0 - 15.9 g/dL Final   10/31/2023 11.3 (L) 12.0 - 15.9 g/dL Final   10/30/2023 10.8 (L) 12.0 - 15.9 g/dL Final      HCT Hematocrit   Date Value Ref Range Status   11/01/2023 40.1 34.0 - 46.6 % Final   10/31/2023 35.2 34.0 - 46.6 % Final   10/30/2023 33.4 (L) 34.0 - 46.6 % Final      Platelets Platelets   Date Value Ref Range Status   11/01/2023 85 (L) 140 - 450 10*3/mm3 Final   10/31/2023 85 (L) 140 - 450 10*3/mm3 Final   10/30/2023 93 (L) 140 - 450 10*3/mm3 Final        PT/INR:    Protime   Date Value Ref Range Status   10/31/2023 13.9 11.7 - 14.2 Seconds Final   /  INR   Date Value Ref Range Status   10/31/2023 1.05 0.90 - 1.10 Final       Sodium Sodium   Date Value Ref Range Status   11/01/2023 127 (L) 136 - 145 mmol/L Final   10/31/2023 125 (L) 136 - 145 mmol/L Final   10/30/2023 128 (L) 136 - 145 mmol/L Final      Potassium Potassium   Date Value Ref Range Status   11/01/2023 3.7 3.5 - 5.2 mmol/L Final   10/31/2023 3.8 3.5 - 5.2 mmol/L Final   10/30/2023 4.0 3.5 - 5.2 mmol/L Final      Chloride Chloride   Date Value Ref Range Status   11/01/2023 89 (L) 98 - 107 mmol/L Final   10/31/2023 90 (L) 98 - 107 mmol/L Final   10/30/2023 94 (L) 98 - 107 mmol/L Final      Bicarbonate CO2   Date Value Ref Range Status   11/01/2023 24.0 22.0 - 29.0 mmol/L Final   10/31/2023 23.1 22.0 - 29.0 mmol/L Final   10/30/2023 23.0 22.0 - 29.0 mmol/L Final      BUN BUN   Date Value Ref Range Status   11/01/2023 34 (H) 6 - 20 mg/dL Final   10/31/2023 48 (H) 6 - 20 mg/dL Final   10/30/2023 56 (H) 6 - 20 mg/dL Final      Creatinine Creatinine   Date Value Ref Range Status   11/01/2023 6.45 (H) 0.57 - 1.00 mg/dL  Final   10/31/2023 7.17 (H) 0.57 - 1.00 mg/dL Final   10/30/2023 7.22 (H) 0.57 - 1.00 mg/dL Final      Calcium Calcium   Date Value Ref Range Status   11/01/2023 8.9 8.6 - 10.5 mg/dL Final   10/31/2023 8.2 (L) 8.6 - 10.5 mg/dL Final   10/30/2023 8.5 (L) 8.6 - 10.5 mg/dL Final      Magnesium Magnesium   Date Value Ref Range Status   11/01/2023 1.6 1.6 - 2.6 mg/dL Final   10/31/2023 1.6 1.6 - 2.6 mg/dL Final   10/30/2023 1.5 (L) 1.6 - 2.6 mg/dL Final          carvedilol, 25 mg, Oral, BID With Meals  [START ON 11/2/2023] ceFAZolin, 2,000 mg, Intravenous, On Call to OR  chlorhexidine, 15 mL, Mouth/Throat, Q12H  Chlorhexidine Gluconate Cloth, 1 application , Topical, Q12H  Delflex-LC/2.5% Dextrose, 2,000 mL, Intraperitoneal, 6 Exchanges Daily (Q4H)  docusate sodium, 100 mg, Oral, BID  [START ON 11/2/2023] metoprolol tartrate, 12.5 mg, Oral, On Call to OR  mupirocin, 1 application , Each Nare, Q12H  senna-docusate sodium, 2 tablet, Oral, BID  sevelamer, 1,600 mg, Oral, TID With Meals  sodium chloride, 10 mL, Intravenous, Q12H         Assessment & Plan  - ascending aortic aneurysm with dissection  - severe aortic valve insufficiency  - ESRD on PD  - Lupus--on Cellcept/plaquenil; currently held  - hx of seizures  - chronic immunosuppression  - hypertension  - chronic anemia  - TCP--chronic    Cellcept/plaquenil on hold. Platelet count 85k this morning. Transfuse 1 unit of platelets this evening. Dr. Medina also made aware of WBC count  Preoperative work up nearly complete. Carotid results pending. CXR and ABG yet to be completed  Noted plans for HD after surgery. Maycol to be placed in the OR  Plan for surgery tomorrow morning with Dr. Medina. Questions answered with verbalized understanding    CAESAR Hernandez  11/01/23  09:32 EDT

## 2023-11-01 NOTE — CASE MANAGEMENT/SOCIAL WORK
Continued Stay Note  [facility]     Patient Name: Dee Herrera  MRN: [id number]  Today's Date: 11/1/2023    Admit Date: 10/26/2023    Plan: Home with mother, continue PD treatment   Discharge Plan    CCP followed up with patient at bedside. Patients plan remains home with her mother at discharge and continuing PD treatment at home as well. Mother to transport at discharge. CCp to follow.                   Discharge Codes    No documentation.                 Expected Discharge Date and Time       Expected Discharge Date Expected Discharge Time    Nov 4, 2023

## 2023-11-02 ENCOUNTER — ANCILLARY PROCEDURE (OUTPATIENT)
Dept: PERIOP | Facility: HOSPITAL | Age: 33
DRG: 219 | End: 2023-11-02
Payer: MEDICARE

## 2023-11-02 ENCOUNTER — APPOINTMENT (OUTPATIENT)
Dept: GENERAL RADIOLOGY | Facility: HOSPITAL | Age: 33
DRG: 219 | End: 2023-11-02
Payer: MEDICARE

## 2023-11-02 ENCOUNTER — ANESTHESIA (OUTPATIENT)
Dept: PERIOP | Facility: HOSPITAL | Age: 33
End: 2023-11-02
Payer: MEDICARE

## 2023-11-02 LAB
ACT BLD: 136 SECONDS (ref 82–152)
ACT BLD: 141 SECONDS (ref 82–152)
ACT BLD: 141 SECONDS (ref 82–152)
ACT BLD: 298 SECONDS (ref 82–152)
ACT BLD: 401 SECONDS (ref 82–152)
ACT BLD: 439 SECONDS (ref 82–152)
ACT BLD: 471 SECONDS (ref 82–152)
ACT BLD: 596 SECONDS (ref 82–152)
ACT BLD: 617 SECONDS (ref 82–152)
ACT BLD: 634 SECONDS (ref 82–152)
ACT BLD: 731 SECONDS (ref 82–152)
ALBUMIN SERPL-MCNC: 2.9 G/DL (ref 3.5–5.2)
ALBUMIN SERPL-MCNC: 3.5 G/DL (ref 3.5–5.2)
ANION GAP SERPL CALCULATED.3IONS-SCNC: 15 MMOL/L (ref 5–15)
ANION GAP SERPL CALCULATED.3IONS-SCNC: 17.6 MMOL/L (ref 5–15)
APTT PPP: 33.6 SECONDS (ref 22.7–35.4)
APTT PPP: 33.7 SECONDS (ref 22.7–35.4)
APTT PPP: 34.3 SECONDS (ref 22.7–35.4)
ARTERIAL PATENCY WRIST A: ABNORMAL
ARTERIAL PATENCY WRIST A: ABNORMAL
ATMOSPHERIC PRESS: 756 MMHG
ATMOSPHERIC PRESS: 757.6 MMHG
BASE EXCESS BLDA CALC-SCNC: 3 MMOL/L (ref -5–5)
BASE EXCESS BLDA CALC-SCNC: 6.4 MMOL/L (ref 0–2)
BASE EXCESS BLDA CALC-SCNC: 7 MMOL/L (ref 0–2)
BASOPHILS # BLD AUTO: 0.01 10*3/MM3 (ref 0–0.2)
BASOPHILS NFR BLD AUTO: 0.2 % (ref 0–1.5)
BDY SITE: ABNORMAL
BDY SITE: ABNORMAL
BUN SERPL-MCNC: 18 MG/DL (ref 6–20)
BUN SERPL-MCNC: 21 MG/DL (ref 6–20)
BUN/CREAT SERPL: 3.9 (ref 7–25)
BUN/CREAT SERPL: 4.2 (ref 7–25)
CA-I BLD-MCNC: 3.9 MG/DL (ref 4.6–5.4)
CA-I BLDA-SCNC: ABNORMAL MMOL/L
CA-I SERPL ISE-MCNC: 0.98 MMOL/L (ref 1.15–1.35)
CALCIUM SPEC-SCNC: 8.2 MG/DL (ref 8.6–10.5)
CALCIUM SPEC-SCNC: 9 MG/DL (ref 8.6–10.5)
CHLORIDE SERPL-SCNC: 100 MMOL/L (ref 98–107)
CHLORIDE SERPL-SCNC: 98 MMOL/L (ref 98–107)
CO2 BLDA-SCNC: 27 MMOL/L (ref 24–29)
CO2 BLDA-SCNC: 30.5 MMOL/L (ref 23–27)
CO2 BLDA-SCNC: 31.6 MMOL/L (ref 23–27)
CO2 SERPL-SCNC: 25.4 MMOL/L (ref 22–29)
CO2 SERPL-SCNC: 28 MMOL/L (ref 22–29)
CREAT SERPL-MCNC: 4.67 MG/DL (ref 0.57–1)
CREAT SERPL-MCNC: 5.02 MG/DL (ref 0.57–1)
DEPRECATED RDW RBC AUTO: 50.6 FL (ref 37–54)
DEPRECATED RDW RBC AUTO: 52.1 FL (ref 37–54)
DEPRECATED RDW RBC AUTO: 53 FL (ref 37–54)
DEPRECATED RDW RBC AUTO: 54.5 FL (ref 37–54)
EGFRCR SERPLBLD CKD-EPI 2021: 11.2 ML/MIN/1.73
EGFRCR SERPLBLD CKD-EPI 2021: 12.2 ML/MIN/1.73
EOSINOPHIL # BLD AUTO: 0 10*3/MM3 (ref 0–0.4)
EOSINOPHIL NFR BLD AUTO: 0 % (ref 0.3–6.2)
ERYTHROCYTE [DISTWIDTH] IN BLOOD BY AUTOMATED COUNT: 16.5 % (ref 12.3–15.4)
ERYTHROCYTE [DISTWIDTH] IN BLOOD BY AUTOMATED COUNT: 16.7 % (ref 12.3–15.4)
ERYTHROCYTE [DISTWIDTH] IN BLOOD BY AUTOMATED COUNT: 16.9 % (ref 12.3–15.4)
ERYTHROCYTE [DISTWIDTH] IN BLOOD BY AUTOMATED COUNT: 17.1 % (ref 12.3–15.4)
FIBRINOGEN PPP-MCNC: 192 MG/DL (ref 219–464)
FIBRINOGEN PPP-MCNC: 246 MG/DL (ref 219–464)
FIBRINOGEN PPP-MCNC: 265 MG/DL (ref 219–464)
GLUCOSE BLDC GLUCOMTR-MCNC: 110 MG/DL (ref 70–130)
GLUCOSE BLDC GLUCOMTR-MCNC: 114 MG/DL (ref 70–130)
GLUCOSE BLDC GLUCOMTR-MCNC: 120 MG/DL (ref 70–130)
GLUCOSE BLDC GLUCOMTR-MCNC: 140 MG/DL (ref 70–130)
GLUCOSE BLDC GLUCOMTR-MCNC: 171 MG/DL (ref 70–130)
GLUCOSE SERPL-MCNC: 110 MG/DL (ref 65–99)
GLUCOSE SERPL-MCNC: 132 MG/DL (ref 65–99)
HCO3 BLDA-SCNC: 26.5 MMOL/L (ref 22–26)
HCO3 BLDA-SCNC: 29.4 MMOL/L (ref 22–28)
HCO3 BLDA-SCNC: 30.4 MMOL/L (ref 22–28)
HCT VFR BLD AUTO: 18.1 % (ref 34–46.6)
HCT VFR BLD AUTO: 24.7 % (ref 34–46.6)
HCT VFR BLD AUTO: 25.8 % (ref 34–46.6)
HCT VFR BLD AUTO: 27.1 % (ref 34–46.6)
HCT VFR BLDA CALC: 26 % (ref 38–51)
HEMODILUTION: NO
HEMODILUTION: NO
HGB BLD-MCNC: 5.7 G/DL (ref 12–15.9)
HGB BLD-MCNC: 7.8 G/DL (ref 12–15.9)
HGB BLD-MCNC: 8.2 G/DL (ref 12–15.9)
HGB BLD-MCNC: 9 G/DL (ref 12–15.9)
HGB BLDA-MCNC: 8.8 G/DL (ref 12–17)
IMM GRANULOCYTES # BLD AUTO: 0.04 10*3/MM3 (ref 0–0.05)
IMM GRANULOCYTES NFR BLD AUTO: 1 % (ref 0–0.5)
INHALED O2 CONCENTRATION: 100 %
INHALED O2 CONCENTRATION: 40 %
INR PPP: 1.4 (ref 0.8–1.2)
INR PPP: 1.62 (ref 0.9–1.1)
INR PPP: 1.7 (ref 0.9–1.1)
INR PPP: 2.21 (ref 0.9–1.1)
LYMPHOCYTES # BLD AUTO: 0.29 10*3/MM3 (ref 0.7–3.1)
LYMPHOCYTES NFR BLD AUTO: 7.2 % (ref 19.6–45.3)
MAGNESIUM SERPL-MCNC: 2.4 MG/DL (ref 1.6–2.6)
MAGNESIUM SERPL-MCNC: 2.5 MG/DL (ref 1.6–2.6)
MCH RBC QN AUTO: 27.5 PG (ref 26.6–33)
MCH RBC QN AUTO: 27.8 PG (ref 26.6–33)
MCH RBC QN AUTO: 27.9 PG (ref 26.6–33)
MCH RBC QN AUTO: 28.2 PG (ref 26.6–33)
MCHC RBC AUTO-ENTMCNC: 31.5 G/DL (ref 31.5–35.7)
MCHC RBC AUTO-ENTMCNC: 31.6 G/DL (ref 31.5–35.7)
MCHC RBC AUTO-ENTMCNC: 31.8 G/DL (ref 31.5–35.7)
MCHC RBC AUTO-ENTMCNC: 33.2 G/DL (ref 31.5–35.7)
MCV RBC AUTO: 85 FL (ref 79–97)
MCV RBC AUTO: 87.4 FL (ref 79–97)
MCV RBC AUTO: 87.8 FL (ref 79–97)
MCV RBC AUTO: 87.9 FL (ref 79–97)
MODALITY: ABNORMAL
MODALITY: ABNORMAL
MONOCYTES # BLD AUTO: 0.46 10*3/MM3 (ref 0.1–0.9)
MONOCYTES NFR BLD AUTO: 11.4 % (ref 5–12)
NEUTROPHILS NFR BLD AUTO: 3.22 10*3/MM3 (ref 1.7–7)
NEUTROPHILS NFR BLD AUTO: 80.2 % (ref 42.7–76)
NRBC BLD AUTO-RTO: 0.2 /100 WBC (ref 0–0.2)
O2 A-A PPRESDIFF RESPIRATORY: 0.7 MMHG
O2 A-A PPRESDIFF RESPIRATORY: 0.8 MMHG
PCO2 BLDA: 33.9 MM HG (ref 35–45)
PCO2 BLDA: 35.1 MM HG (ref 35–45)
PCO2 BLDA: 37.7 MM HG (ref 35–45)
PEEP RESPIRATORY: 5 CM[H2O]
PEEP RESPIRATORY: 5 CM[H2O]
PH BLDA: 7.51 PH UNITS (ref 7.35–7.45)
PH BLDA: 7.52 PH UNITS (ref 7.35–7.6)
PH BLDA: 7.53 PH UNITS (ref 7.35–7.45)
PHOSPHATE SERPL-MCNC: 2.7 MG/DL (ref 2.5–4.5)
PHOSPHATE SERPL-MCNC: 2.7 MG/DL (ref 2.5–4.5)
PLATELET # BLD AUTO: 117 10*3/MM3 (ref 140–450)
PLATELET # BLD AUTO: 162 10*3/MM3 (ref 140–450)
PLATELET # BLD AUTO: 186 10*3/MM3 (ref 140–450)
PLATELET # BLD AUTO: 38 10*3/MM3 (ref 140–450)
PMV BLD AUTO: 10.1 FL (ref 6–12)
PMV BLD AUTO: 10.5 FL (ref 6–12)
PMV BLD AUTO: 10.5 FL (ref 6–12)
PO2 BLDA: 183.4 MM HG (ref 80–100)
PO2 BLDA: 235 MMHG (ref 80–105)
PO2 BLDA: 556 MM HG (ref 80–100)
POTASSIUM BLDA-SCNC: 4.1 MMOL/L (ref 3.5–4.9)
POTASSIUM SERPL-SCNC: 4 MMOL/L (ref 3.5–5.2)
POTASSIUM SERPL-SCNC: 4.1 MMOL/L (ref 3.5–5.2)
PROTHROMBIN TIME: 16.7 SECONDS (ref 12.8–15.2)
PROTHROMBIN TIME: 19.6 SECONDS (ref 11.7–14.2)
PROTHROMBIN TIME: 20.2 SECONDS (ref 11.7–14.2)
PROTHROMBIN TIME: 25 SECONDS (ref 11.7–14.2)
RBC # BLD AUTO: 2.07 10*6/MM3 (ref 3.77–5.28)
RBC # BLD AUTO: 2.81 10*6/MM3 (ref 3.77–5.28)
RBC # BLD AUTO: 2.94 10*6/MM3 (ref 3.77–5.28)
RBC # BLD AUTO: 3.19 10*6/MM3 (ref 3.77–5.28)
SAO2 % BLDA: 100 % (ref 95–98)
SAO2 % BLDCOA: 100 % (ref 92–98.5)
SAO2 % BLDCOA: 99.7 % (ref 92–98.5)
SET MECH RESP RATE: 12
SET MECH RESP RATE: 15
SODIUM SERPL-SCNC: 141 MMOL/L (ref 136–145)
SODIUM SERPL-SCNC: 143 MMOL/L (ref 136–145)
TOTAL RATE: 12 BREATHS/MINUTE
TOTAL RATE: 15 BREATHS/MINUTE
VENTILATOR MODE: ABNORMAL
VENTILATOR MODE: ABNORMAL
VT ON VENT VENT: 510 ML
VT ON VENT VENT: 510 ML
WBC NRBC COR # BLD: 2.86 10*3/MM3 (ref 3.4–10.8)
WBC NRBC COR # BLD: 2.87 10*3/MM3 (ref 3.4–10.8)
WBC NRBC COR # BLD: 4.02 10*3/MM3 (ref 3.4–10.8)
WBC NRBC COR # BLD: 5.07 10*3/MM3 (ref 3.4–10.8)

## 2023-11-02 PROCEDURE — 80069 RENAL FUNCTION PANEL: CPT | Performed by: INTERNAL MEDICINE

## 2023-11-02 PROCEDURE — C1751 CATH, INF, PER/CENT/MIDLINE: HCPCS | Performed by: STUDENT IN AN ORGANIZED HEALTH CARE EDUCATION/TRAINING PROGRAM

## 2023-11-02 PROCEDURE — P9100 PATHOGEN TEST FOR PLATELETS: HCPCS

## 2023-11-02 PROCEDURE — 85347 COAGULATION TIME ACTIVATED: CPT

## 2023-11-02 PROCEDURE — 82330 ASSAY OF CALCIUM: CPT | Performed by: NURSE PRACTITIONER

## 2023-11-02 PROCEDURE — 85014 HEMATOCRIT: CPT

## 2023-11-02 PROCEDURE — 25010000002 PROPOFOL 10 MG/ML EMULSION: Performed by: STUDENT IN AN ORGANIZED HEALTH CARE EDUCATION/TRAINING PROGRAM

## 2023-11-02 PROCEDURE — 93005 ELECTROCARDIOGRAM TRACING: CPT | Performed by: NURSE PRACTITIONER

## 2023-11-02 PROCEDURE — 82948 REAGENT STRIP/BLOOD GLUCOSE: CPT

## 2023-11-02 PROCEDURE — 25010000002 CEFAZOLIN IN DEXTROSE 2000 MG/ 100 ML SOLUTION: Performed by: NURSE PRACTITIONER

## 2023-11-02 PROCEDURE — P9059 PLASMA, FRZ BETWEEN 8-24HOUR: HCPCS

## 2023-11-02 PROCEDURE — 25810000003 SODIUM CHLORIDE 0.9 % SOLUTION 250 ML FLEX CONT: Performed by: STUDENT IN AN ORGANIZED HEALTH CARE EDUCATION/TRAINING PROGRAM

## 2023-11-02 PROCEDURE — 86927 PLASMA FRESH FROZEN: CPT

## 2023-11-02 PROCEDURE — 85610 PROTHROMBIN TIME: CPT

## 2023-11-02 PROCEDURE — 85730 THROMBOPLASTIN TIME PARTIAL: CPT | Performed by: NURSE PRACTITIONER

## 2023-11-02 PROCEDURE — 33864 ASCENDING AORTIC GRAFT: CPT | Performed by: THORACIC SURGERY (CARDIOTHORACIC VASCULAR SURGERY)

## 2023-11-02 PROCEDURE — 85018 HEMOGLOBIN: CPT

## 2023-11-02 PROCEDURE — C1713 ANCHOR/SCREW BN/BN,TIS/BN: HCPCS | Performed by: THORACIC SURGERY (CARDIOTHORACIC VASCULAR SURGERY)

## 2023-11-02 PROCEDURE — P9012 CRYOPRECIPITATE EACH UNIT: HCPCS

## 2023-11-02 PROCEDURE — 36556 INSERT NON-TUNNEL CV CATH: CPT | Performed by: THORACIC SURGERY (CARDIOTHORACIC VASCULAR SURGERY)

## 2023-11-02 PROCEDURE — 93318 ECHO TRANSESOPHAGEAL INTRAOP: CPT

## 2023-11-02 PROCEDURE — 02RX0JZ REPLACEMENT OF THORACIC AORTA, ASCENDING/ARCH WITH SYNTHETIC SUBSTITUTE, OPEN APPROACH: ICD-10-PCS | Performed by: THORACIC SURGERY (CARDIOTHORACIC VASCULAR SURGERY)

## 2023-11-02 PROCEDURE — 86900 BLOOD TYPING SEROLOGIC ABO: CPT

## 2023-11-02 PROCEDURE — 25010000002 FENTANYL CITRATE (PF) 250 MCG/5ML SOLUTION: Performed by: STUDENT IN AN ORGANIZED HEALTH CARE EDUCATION/TRAINING PROGRAM

## 2023-11-02 PROCEDURE — 93010 ELECTROCARDIOGRAM REPORT: CPT | Performed by: INTERNAL MEDICINE

## 2023-11-02 PROCEDURE — 36430 TRANSFUSION BLD/BLD COMPNT: CPT

## 2023-11-02 PROCEDURE — 25010000002 HEPARIN (PORCINE) PER 1000 UNITS: Performed by: THORACIC SURGERY (CARDIOTHORACIC VASCULAR SURGERY)

## 2023-11-02 PROCEDURE — 25010000002 CEFAZOLIN IN DEXTROSE 2-4 GM/100ML-% SOLUTION: Performed by: NURSE PRACTITIONER

## 2023-11-02 PROCEDURE — 82947 ASSAY GLUCOSE BLOOD QUANT: CPT

## 2023-11-02 PROCEDURE — 25010000002 DESMOPRESSIN ACETATE PF 4 MCG/ML SOLUTION 1 ML AMPULE: Performed by: THORACIC SURGERY (CARDIOTHORACIC VASCULAR SURGERY)

## 2023-11-02 PROCEDURE — 71045 X-RAY EXAM CHEST 1 VIEW: CPT

## 2023-11-02 PROCEDURE — 94760 N-INVAS EAR/PLS OXIMETRY 1: CPT

## 2023-11-02 PROCEDURE — 80069 RENAL FUNCTION PANEL: CPT | Performed by: NURSE PRACTITIONER

## 2023-11-02 PROCEDURE — P9035 PLATELET PHERES LEUKOREDUCED: HCPCS

## 2023-11-02 PROCEDURE — 82803 BLOOD GASES ANY COMBINATION: CPT

## 2023-11-02 PROCEDURE — 94761 N-INVAS EAR/PLS OXIMETRY MLT: CPT

## 2023-11-02 PROCEDURE — 85027 COMPLETE CBC AUTOMATED: CPT | Performed by: THORACIC SURGERY (CARDIOTHORACIC VASCULAR SURGERY)

## 2023-11-02 PROCEDURE — 5A1955Z RESPIRATORY VENTILATION, GREATER THAN 96 CONSECUTIVE HOURS: ICD-10-PCS | Performed by: THORACIC SURGERY (CARDIOTHORACIC VASCULAR SURGERY)

## 2023-11-02 PROCEDURE — 25010000002 HEPARIN (PORCINE) PER 1000 UNITS: Performed by: STUDENT IN AN ORGANIZED HEALTH CARE EDUCATION/TRAINING PROGRAM

## 2023-11-02 PROCEDURE — P9016 RBC LEUKOCYTES REDUCED: HCPCS

## 2023-11-02 PROCEDURE — 25010000002 CLEVIDIPINE BUTYRATE PER 1 MG: Performed by: NURSE PRACTITIONER

## 2023-11-02 PROCEDURE — 25810000003 SODIUM CHLORIDE 0.9 % SOLUTION: Performed by: STUDENT IN AN ORGANIZED HEALTH CARE EDUCATION/TRAINING PROGRAM

## 2023-11-02 PROCEDURE — 85025 COMPLETE CBC W/AUTO DIFF WBC: CPT | Performed by: NURSE PRACTITIONER

## 2023-11-02 PROCEDURE — 85610 PROTHROMBIN TIME: CPT | Performed by: THORACIC SURGERY (CARDIOTHORACIC VASCULAR SURGERY)

## 2023-11-02 PROCEDURE — 85384 FIBRINOGEN ACTIVITY: CPT | Performed by: THORACIC SURGERY (CARDIOTHORACIC VASCULAR SURGERY)

## 2023-11-02 PROCEDURE — C1768 GRAFT, VASCULAR: HCPCS | Performed by: THORACIC SURGERY (CARDIOTHORACIC VASCULAR SURGERY)

## 2023-11-02 PROCEDURE — C9248 INJ, CLEVIDIPINE BUTYRATE: HCPCS | Performed by: NURSE PRACTITIONER

## 2023-11-02 PROCEDURE — B246ZZ4 ULTRASONOGRAPHY OF RIGHT AND LEFT HEART, TRANSESOPHAGEAL: ICD-10-PCS | Performed by: THORACIC SURGERY (CARDIOTHORACIC VASCULAR SURGERY)

## 2023-11-02 PROCEDURE — 94799 UNLISTED PULMONARY SVC/PX: CPT

## 2023-11-02 PROCEDURE — 83735 ASSAY OF MAGNESIUM: CPT | Performed by: NURSE PRACTITIONER

## 2023-11-02 PROCEDURE — 33864 ASCENDING AORTIC GRAFT: CPT

## 2023-11-02 PROCEDURE — C1729 CATH, DRAINAGE: HCPCS | Performed by: THORACIC SURGERY (CARDIOTHORACIC VASCULAR SURGERY)

## 2023-11-02 PROCEDURE — C1752 CATH,HEMODIALYSIS,SHORT-TERM: HCPCS | Performed by: THORACIC SURGERY (CARDIOTHORACIC VASCULAR SURGERY)

## 2023-11-02 PROCEDURE — 5A1221Z PERFORMANCE OF CARDIAC OUTPUT, CONTINUOUS: ICD-10-PCS | Performed by: THORACIC SURGERY (CARDIOTHORACIC VASCULAR SURGERY)

## 2023-11-02 PROCEDURE — 88305 TISSUE EXAM BY PATHOLOGIST: CPT | Performed by: THORACIC SURGERY (CARDIOTHORACIC VASCULAR SURGERY)

## 2023-11-02 PROCEDURE — 25010000002 MIDAZOLAM PER 1 MG: Performed by: STUDENT IN AN ORGANIZED HEALTH CARE EDUCATION/TRAINING PROGRAM

## 2023-11-02 PROCEDURE — 25010000002 PROTAMINE SULFATE PER 10 MG: Performed by: STUDENT IN AN ORGANIZED HEALTH CARE EDUCATION/TRAINING PROGRAM

## 2023-11-02 PROCEDURE — 25010000002 VANCOMYCIN 1 G RECONSTITUTED SOLUTION 1 EACH VIAL: Performed by: THORACIC SURGERY (CARDIOTHORACIC VASCULAR SURGERY)

## 2023-11-02 PROCEDURE — 85027 COMPLETE CBC AUTOMATED: CPT | Performed by: NURSE PRACTITIONER

## 2023-11-02 PROCEDURE — 85610 PROTHROMBIN TIME: CPT | Performed by: NURSE PRACTITIONER

## 2023-11-02 PROCEDURE — 25010000002 MAGNESIUM SULFATE PER 500 MG OF MAGNESIUM: Performed by: STUDENT IN AN ORGANIZED HEALTH CARE EDUCATION/TRAINING PROGRAM

## 2023-11-02 PROCEDURE — 94002 VENT MGMT INPAT INIT DAY: CPT

## 2023-11-02 PROCEDURE — 85730 THROMBOPLASTIN TIME PARTIAL: CPT | Performed by: THORACIC SURGERY (CARDIOTHORACIC VASCULAR SURGERY)

## 2023-11-02 PROCEDURE — C1894 INTRO/SHEATH, NON-LASER: HCPCS | Performed by: THORACIC SURGERY (CARDIOTHORACIC VASCULAR SURGERY)

## 2023-11-02 PROCEDURE — 25010000002 ACETAMINOPHEN 10 MG/ML SOLUTION: Performed by: NURSE PRACTITIONER

## 2023-11-02 PROCEDURE — 25010000002 PHENYLEPHRINE 10 MG/ML SOLUTION 5 ML VIAL: Performed by: STUDENT IN AN ORGANIZED HEALTH CARE EDUCATION/TRAINING PROGRAM

## 2023-11-02 PROCEDURE — 85384 FIBRINOGEN ACTIVITY: CPT | Performed by: NURSE PRACTITIONER

## 2023-11-02 DEVICE — HEMOST ABS SURGIFOAM SZ100 8X12 10MM: Type: IMPLANTABLE DEVICE | Site: CHEST | Status: FUNCTIONAL

## 2023-11-02 DEVICE — SEALANT HEMO SURG COSEAL PREMIX 8ML: Type: IMPLANTABLE DEVICE | Site: CHEST | Status: FUNCTIONAL

## 2023-11-02 DEVICE — SS SUTURE, 4 PER SLEEVE
Type: IMPLANTABLE DEVICE | Site: STERNUM | Status: FUNCTIONAL
Brand: MYO/WIRE II

## 2023-11-02 DEVICE — FLOSEAL WITH RECOTHROM - 5ML
Type: IMPLANTABLE DEVICE | Site: CHEST | Status: FUNCTIONAL
Brand: FLOSEAL HEMOSTATIC MATRIX

## 2023-11-02 DEVICE — GELWEAVE GELATIN IMPREGNATED WOVEN VASCULAR PROSTHESIS STRAIGHT
Type: IMPLANTABLE DEVICE | Site: HEART | Status: FUNCTIONAL
Brand: GELWEAVE™

## 2023-11-02 DEVICE — ACUTE DUAL LUMEN CATHETER TRAY,STRAIGHT EXTENSIONS WITH IC* SAFETY COMPONENTS,12 FR/CH (4.0 MM) X 24 CM
Type: IMPLANTABLE DEVICE | Site: CHEST | Status: FUNCTIONAL
Brand: MAHURKAR ELITE

## 2023-11-02 DEVICE — ABSORBABLE HEMOSTAT (OXIDIZED REGENERATED CELLULOSE, U.S.P.)
Type: IMPLANTABLE DEVICE | Site: HEART | Status: FUNCTIONAL
Brand: SURGICEL

## 2023-11-02 DEVICE — SS SUTURE, 3 PER SLEEVE
Type: IMPLANTABLE DEVICE | Site: STERNUM | Status: FUNCTIONAL
Brand: MYO/WIRE II

## 2023-11-02 RX ORDER — ONDANSETRON 2 MG/ML
4 INJECTION INTRAMUSCULAR; INTRAVENOUS EVERY 6 HOURS PRN
Status: DISCONTINUED | OUTPATIENT
Start: 2023-11-02 | End: 2023-12-09 | Stop reason: HOSPADM

## 2023-11-02 RX ORDER — PROPOFOL 10 MG/ML
VIAL (ML) INTRAVENOUS AS NEEDED
Status: DISCONTINUED | OUTPATIENT
Start: 2023-11-02 | End: 2023-11-02 | Stop reason: SURG

## 2023-11-02 RX ORDER — HEPARIN SODIUM 5000 [USP'U]/ML
INJECTION, SOLUTION INTRAVENOUS; SUBCUTANEOUS AS NEEDED
Status: DISCONTINUED | OUTPATIENT
Start: 2023-11-02 | End: 2023-11-02 | Stop reason: HOSPADM

## 2023-11-02 RX ORDER — MORPHINE SULFATE 2 MG/ML
4 INJECTION, SOLUTION INTRAMUSCULAR; INTRAVENOUS
Status: DISCONTINUED | OUTPATIENT
Start: 2023-11-02 | End: 2023-11-06

## 2023-11-02 RX ORDER — HYDROCODONE BITARTRATE AND ACETAMINOPHEN 5; 325 MG/1; MG/1
2 TABLET ORAL EVERY 4 HOURS PRN
Status: DISCONTINUED | OUTPATIENT
Start: 2023-11-02 | End: 2023-11-09

## 2023-11-02 RX ORDER — BISACODYL 10 MG
10 SUPPOSITORY, RECTAL RECTAL DAILY PRN
Status: DISCONTINUED | OUTPATIENT
Start: 2023-11-03 | End: 2023-12-09 | Stop reason: HOSPADM

## 2023-11-02 RX ORDER — CEFAZOLIN SODIUM 2 G/100ML
2 INJECTION, SOLUTION INTRAVENOUS EVERY 8 HOURS
Status: DISCONTINUED | OUTPATIENT
Start: 2023-11-02 | End: 2023-11-02 | Stop reason: DRUGHIGH

## 2023-11-02 RX ORDER — HEPARIN SODIUM 1000 [USP'U]/ML
INJECTION, SOLUTION INTRAVENOUS; SUBCUTANEOUS AS NEEDED
Status: DISCONTINUED | OUTPATIENT
Start: 2023-11-02 | End: 2023-11-02 | Stop reason: SURG

## 2023-11-02 RX ORDER — ALBUMIN, HUMAN INJ 5% 5 %
1000 SOLUTION INTRAVENOUS AS NEEDED
Status: DISPENSED | OUTPATIENT
Start: 2023-11-02 | End: 2023-11-03

## 2023-11-02 RX ORDER — SODIUM CHLORIDE 9 MG/ML
INJECTION, SOLUTION INTRAVENOUS CONTINUOUS PRN
Status: DISCONTINUED | OUTPATIENT
Start: 2023-11-02 | End: 2023-11-02 | Stop reason: SURG

## 2023-11-02 RX ORDER — IBUPROFEN 600 MG/1
1 TABLET ORAL
Status: DISCONTINUED | OUTPATIENT
Start: 2023-11-02 | End: 2023-11-07

## 2023-11-02 RX ORDER — PHENYLEPHRINE HCL IN 0.9% NACL 0.5 MG/5ML
.2-2 SYRINGE (ML) INTRAVENOUS CONTINUOUS PRN
Status: DISCONTINUED | OUTPATIENT
Start: 2023-11-02 | End: 2023-11-06

## 2023-11-02 RX ORDER — ATORVASTATIN CALCIUM 20 MG/1
40 TABLET, FILM COATED ORAL NIGHTLY
Status: DISCONTINUED | OUTPATIENT
Start: 2023-11-02 | End: 2023-11-12

## 2023-11-02 RX ORDER — NICOTINE POLACRILEX 4 MG
15 LOZENGE BUCCAL
Status: DISCONTINUED | OUTPATIENT
Start: 2023-11-02 | End: 2023-11-09

## 2023-11-02 RX ORDER — ACETAMINOPHEN 325 MG/1
650 TABLET ORAL EVERY 4 HOURS PRN
Status: DISCONTINUED | OUTPATIENT
Start: 2023-11-03 | End: 2023-12-09 | Stop reason: HOSPADM

## 2023-11-02 RX ORDER — ACETAMINOPHEN 325 MG/1
650 TABLET ORAL EVERY 4 HOURS
Qty: 8 TABLET | Refills: 0 | Status: ACTIVE | OUTPATIENT
Start: 2023-11-03 | End: 2023-11-04

## 2023-11-02 RX ORDER — METOCLOPRAMIDE HYDROCHLORIDE 5 MG/ML
5 INJECTION INTRAMUSCULAR; INTRAVENOUS EVERY 6 HOURS
Status: DISPENSED | OUTPATIENT
Start: 2023-11-02 | End: 2023-11-03

## 2023-11-02 RX ORDER — AMINOCAPROIC ACID 250 MG/ML
INJECTION, SOLUTION INTRAVENOUS AS NEEDED
Status: DISCONTINUED | OUTPATIENT
Start: 2023-11-02 | End: 2023-11-02 | Stop reason: SURG

## 2023-11-02 RX ORDER — NALOXONE HCL 0.4 MG/ML
0.4 VIAL (ML) INJECTION
Status: DISCONTINUED | OUTPATIENT
Start: 2023-11-02 | End: 2023-12-09 | Stop reason: HOSPADM

## 2023-11-02 RX ORDER — DEXMEDETOMIDINE HYDROCHLORIDE 4 UG/ML
.2-1.5 INJECTION, SOLUTION INTRAVENOUS
Status: DISCONTINUED | OUTPATIENT
Start: 2023-11-02 | End: 2023-11-10

## 2023-11-02 RX ORDER — NOREPINEPHRINE BITARTRATE 1 MG/ML
INJECTION, SOLUTION INTRAVENOUS CONTINUOUS PRN
Status: DISCONTINUED | OUTPATIENT
Start: 2023-11-02 | End: 2023-11-02 | Stop reason: SURG

## 2023-11-02 RX ORDER — KETAMINE HCL IN NACL, ISO-OSM 100MG/10ML
SYRINGE (ML) INJECTION AS NEEDED
Status: DISCONTINUED | OUTPATIENT
Start: 2023-11-02 | End: 2023-11-02 | Stop reason: SURG

## 2023-11-02 RX ORDER — ALBUTEROL SULFATE 90 UG/1
AEROSOL, METERED RESPIRATORY (INHALATION) AS NEEDED
Status: DISCONTINUED | OUTPATIENT
Start: 2023-11-02 | End: 2023-11-02 | Stop reason: SURG

## 2023-11-02 RX ORDER — AMOXICILLIN 250 MG
2 CAPSULE ORAL NIGHTLY
Status: DISCONTINUED | OUTPATIENT
Start: 2023-11-03 | End: 2023-11-12

## 2023-11-02 RX ORDER — CYCLOBENZAPRINE HCL 10 MG
10 TABLET ORAL EVERY 8 HOURS PRN
Status: DISCONTINUED | OUTPATIENT
Start: 2023-11-03 | End: 2023-11-09

## 2023-11-02 RX ORDER — MILRINONE LACTATE 0.2 MG/ML
.25-.375 INJECTION, SOLUTION INTRAVENOUS CONTINUOUS PRN
Status: DISCONTINUED | OUTPATIENT
Start: 2023-11-02 | End: 2023-11-06

## 2023-11-02 RX ORDER — MAGNESIUM SULFATE HEPTAHYDRATE 500 MG/ML
INJECTION, SOLUTION INTRAMUSCULAR; INTRAVENOUS AS NEEDED
Status: DISCONTINUED | OUTPATIENT
Start: 2023-11-02 | End: 2023-11-02 | Stop reason: SURG

## 2023-11-02 RX ORDER — PROTAMINE SULFATE 10 MG/ML
INJECTION, SOLUTION INTRAVENOUS AS NEEDED
Status: DISCONTINUED | OUTPATIENT
Start: 2023-11-02 | End: 2023-11-02 | Stop reason: SURG

## 2023-11-02 RX ORDER — HEPARIN SODIUM 5000 [USP'U]/ML
5000 INJECTION, SOLUTION INTRAVENOUS; SUBCUTANEOUS EVERY 8 HOURS SCHEDULED
Status: DISCONTINUED | OUTPATIENT
Start: 2023-11-03 | End: 2023-11-19

## 2023-11-02 RX ORDER — ACETAMINOPHEN 650 MG/1
650 SUPPOSITORY RECTAL EVERY 4 HOURS
Qty: 4 SUPPOSITORY | Refills: 0 | Status: DISPENSED | OUTPATIENT
Start: 2023-11-03 | End: 2023-11-04

## 2023-11-02 RX ORDER — OXYCODONE HYDROCHLORIDE 5 MG/1
10 TABLET ORAL EVERY 4 HOURS PRN
Status: DISCONTINUED | OUTPATIENT
Start: 2023-11-02 | End: 2023-11-09

## 2023-11-02 RX ORDER — ASPIRIN 81 MG/1
81 TABLET ORAL DAILY
Status: DISCONTINUED | OUTPATIENT
Start: 2023-11-03 | End: 2023-11-03

## 2023-11-02 RX ORDER — MIDAZOLAM HYDROCHLORIDE 1 MG/ML
INJECTION INTRAMUSCULAR; INTRAVENOUS AS NEEDED
Status: DISCONTINUED | OUTPATIENT
Start: 2023-11-02 | End: 2023-11-02 | Stop reason: SURG

## 2023-11-02 RX ORDER — NOREPINEPHRINE BITARTRATE 0.03 MG/ML
.02-.2 INJECTION, SOLUTION INTRAVENOUS CONTINUOUS PRN
Status: DISCONTINUED | OUTPATIENT
Start: 2023-11-02 | End: 2023-11-06

## 2023-11-02 RX ORDER — ACETAMINOPHEN 160 MG/5ML
650 SOLUTION ORAL EVERY 4 HOURS PRN
Status: DISCONTINUED | OUTPATIENT
Start: 2023-11-03 | End: 2023-12-09 | Stop reason: HOSPADM

## 2023-11-02 RX ORDER — PANTOPRAZOLE SODIUM 40 MG/1
40 TABLET, DELAYED RELEASE ORAL EVERY MORNING
Status: DISCONTINUED | OUTPATIENT
Start: 2023-11-03 | End: 2023-11-09

## 2023-11-02 RX ORDER — CHLORHEXIDINE GLUCONATE ORAL RINSE 1.2 MG/ML
15 SOLUTION DENTAL EVERY 12 HOURS
Status: DISCONTINUED | OUTPATIENT
Start: 2023-11-02 | End: 2023-12-07

## 2023-11-02 RX ORDER — CEFAZOLIN SODIUM 2 G/100ML
2000 INJECTION, SOLUTION INTRAVENOUS EVERY 24 HOURS
Qty: 300 ML | Refills: 0 | Status: COMPLETED | OUTPATIENT
Start: 2023-11-02 | End: 2023-11-04

## 2023-11-02 RX ORDER — ALPRAZOLAM 0.25 MG/1
0.25 TABLET ORAL EVERY 8 HOURS PRN
Status: DISCONTINUED | OUTPATIENT
Start: 2023-11-02 | End: 2023-11-09

## 2023-11-02 RX ORDER — PANTOPRAZOLE SODIUM 40 MG/10ML
40 INJECTION, POWDER, LYOPHILIZED, FOR SOLUTION INTRAVENOUS ONCE
Status: COMPLETED | OUTPATIENT
Start: 2023-11-02 | End: 2023-11-02

## 2023-11-02 RX ORDER — DEXTROSE MONOHYDRATE 25 G/50ML
10-50 INJECTION, SOLUTION INTRAVENOUS
Status: DISCONTINUED | OUTPATIENT
Start: 2023-11-02 | End: 2023-11-09

## 2023-11-02 RX ORDER — ROCURONIUM BROMIDE 10 MG/ML
INJECTION, SOLUTION INTRAVENOUS AS NEEDED
Status: DISCONTINUED | OUTPATIENT
Start: 2023-11-02 | End: 2023-11-02 | Stop reason: SURG

## 2023-11-02 RX ORDER — ACETAMINOPHEN 10 MG/ML
1000 INJECTION, SOLUTION INTRAVENOUS EVERY 8 HOURS
Status: DISPENSED | OUTPATIENT
Start: 2023-11-02 | End: 2023-11-03

## 2023-11-02 RX ORDER — ACETAMINOPHEN 160 MG/5ML
650 SOLUTION ORAL EVERY 4 HOURS
Status: DISPENSED | OUTPATIENT
Start: 2023-11-03 | End: 2023-11-04

## 2023-11-02 RX ORDER — NITROGLYCERIN 0.4 MG/1
0.4 TABLET SUBLINGUAL
Status: DISCONTINUED | OUTPATIENT
Start: 2023-11-02 | End: 2023-12-09 | Stop reason: HOSPADM

## 2023-11-02 RX ORDER — SODIUM CHLORIDE 9 MG/ML
30 INJECTION, SOLUTION INTRAVENOUS CONTINUOUS
Status: DISCONTINUED | OUTPATIENT
Start: 2023-11-02 | End: 2023-11-15

## 2023-11-02 RX ORDER — POLYETHYLENE GLYCOL 3350 17 G/17G
17 POWDER, FOR SOLUTION ORAL DAILY PRN
Status: DISCONTINUED | OUTPATIENT
Start: 2023-11-02 | End: 2023-12-09 | Stop reason: HOSPADM

## 2023-11-02 RX ORDER — BISACODYL 5 MG/1
10 TABLET, DELAYED RELEASE ORAL DAILY PRN
Status: DISCONTINUED | OUTPATIENT
Start: 2023-11-02 | End: 2023-12-09 | Stop reason: HOSPADM

## 2023-11-02 RX ORDER — CALCIUM CHLORIDE 100 MG/ML
INJECTION INTRAVENOUS; INTRAVENTRICULAR AS NEEDED
Status: DISCONTINUED | OUTPATIENT
Start: 2023-11-02 | End: 2023-11-02 | Stop reason: SURG

## 2023-11-02 RX ORDER — ACETAMINOPHEN 650 MG/1
650 SUPPOSITORY RECTAL EVERY 4 HOURS PRN
Status: DISCONTINUED | OUTPATIENT
Start: 2023-11-03 | End: 2023-12-09 | Stop reason: HOSPADM

## 2023-11-02 RX ORDER — MIDAZOLAM HYDROCHLORIDE 1 MG/ML
2 INJECTION INTRAMUSCULAR; INTRAVENOUS
Status: DISCONTINUED | OUTPATIENT
Start: 2023-11-02 | End: 2023-11-06

## 2023-11-02 RX ORDER — FENTANYL CITRATE 50 UG/ML
INJECTION, SOLUTION INTRAMUSCULAR; INTRAVENOUS AS NEEDED
Status: DISCONTINUED | OUTPATIENT
Start: 2023-11-02 | End: 2023-11-02 | Stop reason: SURG

## 2023-11-02 RX ORDER — DOPAMINE HYDROCHLORIDE 160 MG/100ML
2-20 INJECTION, SOLUTION INTRAVENOUS CONTINUOUS PRN
Status: DISCONTINUED | OUTPATIENT
Start: 2023-11-02 | End: 2023-11-06

## 2023-11-02 RX ORDER — MEPERIDINE HYDROCHLORIDE 25 MG/ML
25 INJECTION INTRAMUSCULAR; INTRAVENOUS; SUBCUTANEOUS EVERY 4 HOURS PRN
Status: ACTIVE | OUTPATIENT
Start: 2023-11-02 | End: 2023-11-03

## 2023-11-02 RX ORDER — NITROGLYCERIN 20 MG/100ML
5-200 INJECTION INTRAVENOUS
Status: DISCONTINUED | OUTPATIENT
Start: 2023-11-02 | End: 2023-11-09

## 2023-11-02 RX ORDER — ENOXAPARIN SODIUM 100 MG/ML
40 INJECTION SUBCUTANEOUS EVERY 24 HOURS
Status: DISCONTINUED | OUTPATIENT
Start: 2023-11-03 | End: 2023-11-02

## 2023-11-02 RX ORDER — MORPHINE SULFATE 2 MG/ML
1 INJECTION, SOLUTION INTRAMUSCULAR; INTRAVENOUS EVERY 4 HOURS PRN
Status: DISPENSED | OUTPATIENT
Start: 2023-11-02 | End: 2023-11-09

## 2023-11-02 RX ADMIN — MIDAZOLAM 2 MG: 1 INJECTION INTRAMUSCULAR; INTRAVENOUS at 06:45

## 2023-11-02 RX ADMIN — CLEVIPIDINE 14 MG/HR: 0.5 EMULSION INTRAVENOUS at 17:34

## 2023-11-02 RX ADMIN — PANTOPRAZOLE SODIUM 40 MG: 40 INJECTION, POWDER, FOR SOLUTION INTRAVENOUS at 15:44

## 2023-11-02 RX ADMIN — PROTAMINE SULFATE 200 MG: 10 INJECTION, SOLUTION INTRAVENOUS at 11:08

## 2023-11-02 RX ADMIN — ROCURONIUM BROMIDE 25 MG: 10 INJECTION, SOLUTION INTRAVENOUS at 12:15

## 2023-11-02 RX ADMIN — ROCURONIUM BROMIDE 30 MG: 10 INJECTION, SOLUTION INTRAVENOUS at 08:08

## 2023-11-02 RX ADMIN — 0.12% CHLORHEXIDINE GLUCONATE 15 ML: 1.2 RINSE ORAL at 20:45

## 2023-11-02 RX ADMIN — ROCURONIUM BROMIDE 25 MG: 10 INJECTION, SOLUTION INTRAVENOUS at 08:36

## 2023-11-02 RX ADMIN — CLEVIPIDINE 15 MG/HR: 0.5 EMULSION INTRAVENOUS at 19:10

## 2023-11-02 RX ADMIN — PROPOFOL 100 MG: 10 INJECTION, EMULSION INTRAVENOUS at 07:22

## 2023-11-02 RX ADMIN — CEFAZOLIN SODIUM 2000 MG: 2 INJECTION, SOLUTION INTRAVENOUS at 11:44

## 2023-11-02 RX ADMIN — SODIUM CHLORIDE: 9 INJECTION, SOLUTION INTRAVENOUS at 07:15

## 2023-11-02 RX ADMIN — HEPARIN SODIUM 15000 UNITS: 1000 INJECTION, SOLUTION INTRAVENOUS; SUBCUTANEOUS at 12:11

## 2023-11-02 RX ADMIN — NOREPINEPHRINE BITARTRATE 0.02 MCG/KG/MIN: 1 SOLUTION INTRAVENOUS at 08:17

## 2023-11-02 RX ADMIN — ROCURONIUM BROMIDE 25 MG: 10 INJECTION, SOLUTION INTRAVENOUS at 09:54

## 2023-11-02 RX ADMIN — FENTANYL CITRATE 150 MCG: 50 INJECTION, SOLUTION INTRAMUSCULAR; INTRAVENOUS at 08:08

## 2023-11-02 RX ADMIN — CLEVIPIDINE 16 MG/HR: 0.5 EMULSION INTRAVENOUS at 21:35

## 2023-11-02 RX ADMIN — MAGNESIUM SULFATE HEPTAHYDRATE 2 G: 500 INJECTION, SOLUTION INTRAMUSCULAR; INTRAVENOUS at 10:46

## 2023-11-02 RX ADMIN — FENTANYL CITRATE 150 MCG: 50 INJECTION, SOLUTION INTRAMUSCULAR; INTRAVENOUS at 08:38

## 2023-11-02 RX ADMIN — AMINOCAPROIC ACID 10 G: 250 INJECTION, SOLUTION INTRAVENOUS at 08:21

## 2023-11-02 RX ADMIN — CALCIUM CHLORIDE 0.5 G: 100 INJECTION INTRAVENOUS; INTRAVENTRICULAR at 10:46

## 2023-11-02 RX ADMIN — CALCIUM CHLORIDE 0.5 G: 100 INJECTION INTRAVENOUS; INTRAVENTRICULAR at 13:27

## 2023-11-02 RX ADMIN — SODIUM CHLORIDE 0.4 MCG/KG/HR: 9 INJECTION, SOLUTION INTRAVENOUS at 08:29

## 2023-11-02 RX ADMIN — DEXTROSE MONOHYDRATE, SODIUM CHLORIDE, SODIUM LACTATE, CALCIUM CHLORIDE, MAGNESIUM CHLORIDE 2000 ML: 2.5; 538; 448; 18.4; 5.08 SOLUTION INTRAPERITONEAL at 00:06

## 2023-11-02 RX ADMIN — METOPROLOL TARTRATE 12.5 MG: 25 TABLET, FILM COATED ORAL at 05:38

## 2023-11-02 RX ADMIN — ROCURONIUM BROMIDE 70 MG: 10 INJECTION, SOLUTION INTRAVENOUS at 07:22

## 2023-11-02 RX ADMIN — ROCURONIUM BROMIDE 25 MG: 10 INJECTION, SOLUTION INTRAVENOUS at 09:10

## 2023-11-02 RX ADMIN — PROPOFOL 25 MCG/KG/MIN: 10 INJECTION, EMULSION INTRAVENOUS at 08:29

## 2023-11-02 RX ADMIN — CEFAZOLIN SODIUM 2000 MG: 2 INJECTION, SOLUTION INTRAVENOUS at 07:46

## 2023-11-02 RX ADMIN — FENTANYL CITRATE 100 MCG: 50 INJECTION, SOLUTION INTRAMUSCULAR; INTRAVENOUS at 12:15

## 2023-11-02 RX ADMIN — FENTANYL CITRATE 200 MCG: 50 INJECTION, SOLUTION INTRAMUSCULAR; INTRAVENOUS at 07:22

## 2023-11-02 RX ADMIN — ACETAMINOPHEN 650 MG: 325 TABLET ORAL at 02:20

## 2023-11-02 RX ADMIN — CEFAZOLIN SODIUM 2000 MG: 2 INJECTION, SOLUTION INTRAVENOUS at 17:40

## 2023-11-02 RX ADMIN — AMINOCAPROIC ACID 10 G: 250 INJECTION, SOLUTION INTRAVENOUS at 11:20

## 2023-11-02 RX ADMIN — ALBUTEROL SULFATE 10 PUFF: 90 AEROSOL, METERED RESPIRATORY (INHALATION) at 07:25

## 2023-11-02 RX ADMIN — PROTAMINE SULFATE 200 MG: 10 INJECTION, SOLUTION INTRAVENOUS at 12:44

## 2023-11-02 RX ADMIN — NICARDIPINE HYDROCHLORIDE 10 MG/HR: 25 INJECTION, SOLUTION INTRAVENOUS at 10:54

## 2023-11-02 RX ADMIN — ROCURONIUM BROMIDE 25 MG: 10 INJECTION, SOLUTION INTRAVENOUS at 10:46

## 2023-11-02 RX ADMIN — HEPARIN SODIUM 15000 UNITS: 1000 INJECTION, SOLUTION INTRAVENOUS; SUBCUTANEOUS at 08:20

## 2023-11-02 RX ADMIN — CLEVIPIDINE 19 MG/HR: 0.5 EMULSION INTRAVENOUS at 22:54

## 2023-11-02 RX ADMIN — DESMOPRESSIN ACETATE 13 MCG: 4 INJECTION INTRAVENOUS; SUBCUTANEOUS at 11:31

## 2023-11-02 RX ADMIN — ACETAMINOPHEN 1000 MG: 10 INJECTION, SOLUTION INTRAVENOUS at 15:44

## 2023-11-02 RX ADMIN — DEXTROSE MONOHYDRATE, SODIUM CHLORIDE, SODIUM LACTATE, CALCIUM CHLORIDE, MAGNESIUM CHLORIDE 2000 ML: 2.5; 538; 448; 18.4; 5.08 SOLUTION INTRAPERITONEAL at 04:13

## 2023-11-02 RX ADMIN — CLEVIPIDINE 2 MG/HR: 0.5 EMULSION INTRAVENOUS at 14:35

## 2023-11-02 RX ADMIN — ACETAMINOPHEN 1000 MG: 10 INJECTION, SOLUTION INTRAVENOUS at 22:02

## 2023-11-02 RX ADMIN — MUPIROCIN 1 APPLICATION: 20 OINTMENT TOPICAL at 20:45

## 2023-11-02 RX ADMIN — FENTANYL CITRATE 150 MCG: 50 INJECTION, SOLUTION INTRAMUSCULAR; INTRAVENOUS at 09:10

## 2023-11-02 RX ADMIN — PROTAMINE SULFATE 25 MG: 10 INJECTION, SOLUTION INTRAVENOUS at 13:11

## 2023-11-02 RX ADMIN — SODIUM CHLORIDE: 9 INJECTION, SOLUTION INTRAVENOUS at 06:44

## 2023-11-02 RX ADMIN — Medication 50 MG: at 09:12

## 2023-11-02 RX ADMIN — CLEVIPIDINE 16 MG/HR: 0.5 EMULSION INTRAVENOUS at 20:01

## 2023-11-02 RX ADMIN — PHENYLEPHRINE HYDROCHLORIDE 2 MCG/KG/MIN: 10 INJECTION, SOLUTION INTRAVENOUS at 08:37

## 2023-11-02 NOTE — ANESTHESIA PROCEDURE NOTES
Airway  Urgency: elective    Date/Time: 11/2/2023 7:24 AM  Airway not difficult    General Information and Staff    Patient location during procedure: OR  Anesthesiologist: Glen Yusuf MD    Indications and Patient Condition  Indications for airway management: airway protection    Preoxygenated: yes  Mask difficulty assessment: 1 - vent by mask    Final Airway Details  Final airway type: endotracheal airway      Successful airway: Microcuff Subglottic Suctioning ETT  Cuffed: yes   Successful intubation technique: direct laryngoscopy  Facilitating devices/methods: intubating stylet  Endotracheal tube insertion site: oral  Blade: Collin  Blade size: 3  Cormack-Lehane Classification: grade I - full view of glottis  Placement verified by: chest auscultation   Measured from: teeth  ETT/EBT  to teeth (cm): 23  Number of attempts at approach: 1  Assessment: lips, teeth, and gum same as pre-op and atraumatic intubation

## 2023-11-02 NOTE — PROGRESS NOTES
[facility] Progress Note      Patient Name: Dee Herrera  : 1990  MRN: [id number]  Primary Care Physician:  Margarita Woods APRN  Date of admission: 10/26/2023    Subjective     Interval History:   Follow-up end-stage renal disease on peritoneal dialysis    The patient currently on the ventilator she is postoperative repair of her type I aortic dissection.  She had a right femoral temporary dialysis catheter placed.  Her chemistry still pending.  Currently on nicardipine drip    Review of Systems:   As noted above    Objective     Vitals:   Temp:  [97.9 °F (36.6 °C)-99.3 °F (37.4 °C)] 98.8 °F (37.1 °C)  Heart Rate:  [64-94] 94  Resp:  [14-16] 14  BP: (102-117)/(52-79) 117/79    Intake/Output Summary (Last 24 hours) at 2023 1448  Last data filed at 2023 1336  Gross per 24 hour   Intake 54173 ml   Output 8800 ml   Net 2258 ml       Physical Exam:    General Appearance: On the ventilator, sedated, chronically ill and frail  Skin: warm and dry  HEENT: Orally intubated  Neck: supple, no JVD  Lungs: CTA, unlabored breathing effort  Heart: RRR, normal S1 and S2, no rub  Abdomen: soft, no guarding nondistended, normoactive bowels and PD catheter in place with clean exit site  Extremities: no edema, cyanosis or clubbing, temporary dialysis catheter in the right femoral vein  Neuro: Unable to assess    Scheduled Meds:     acetaminophen, 1,000 mg, Intravenous, Q8H  [START ON 11/3/2023] acetaminophen, 650 mg, Oral, Q4H   Or  [START ON 11/3/2023] acetaminophen, 650 mg, Oral, Q4H   Or  [START ON 11/3/2023] acetaminophen, 650 mg, Rectal, Q4H  [START ON 11/3/2023] aspirin, 81 mg, Oral, Daily  atorvastatin, 40 mg, Oral, Nightly  ceFAZolin, 2,000 mg, Intravenous, Q24H  chlorhexidine, 15 mL, Mouth/Throat, Q12H  [START ON 11/3/2023] heparin (porcine), 5,000 Units, Subcutaneous, Q8H  metoclopramide, 5 mg, Intravenous, Q6H  [START ON 11/3/2023] metoprolol tartrate, 12.5 mg, Oral,  Q12H  mupirocin, , Each Nare, BID  pantoprazole, 40 mg, Intravenous, Once   Followed by  [START ON 11/3/2023] pantoprazole, 40 mg, Oral, QAM  [START ON 11/3/2023] senna-docusate sodium, 2 tablet, Oral, Nightly      IV Meds:   clevidipine, 2-32 mg/hr, Last Rate: 2 mg/hr (11/02/23 1435)  dexmedetomidine, 0.2-1.5 mcg/kg/hr, Last Rate: 0.5 mcg/kg/hr (11/02/23 1400)  DOPamine, 2-20 mcg/kg/min  EPINEPHrine, 0.02-0.1 mcg/kg/min  insulin, 0-100 Units/hr  milrinone, 0.25-0.375 mcg/kg/min  niCARdipine, 5-15 mg/hr, Last Rate: 2.5 mg/hr (11/02/23 1400)  nitroglycerin, 5-200 mcg/min  norepinephrine, 0.02-0.2 mcg/kg/min  phenylephrine, 0.2-2 mcg/kg/min  propofol, 5-50 mcg/kg/min, Last Rate: 40 mcg/kg/min (11/02/23 1400)  sodium chloride, 30 mL/hr, Last Rate: 30 mL/hr (11/02/23 1400)        Results Reviewed:   I have personally reviewed the results from the time of this admission to 11/2/2023 14:48 EDT     Results from last 7 days   Lab Units 11/01/23 0557 10/31/23  0520 10/30/23  0540   SODIUM mmol/L 127* 125* 128*   POTASSIUM mmol/L 3.7 3.8 4.0   CHLORIDE mmol/L 89* 90* 94*   CO2 mmol/L 24.0 23.1 23.0   BUN mg/dL 34* 48* 56*   CREATININE mg/dL 6.45* 7.17* 7.22*   CALCIUM mg/dL 8.9 8.2* 8.5*   GLUCOSE mg/dL 117* 126* 120*       Estimated Creatinine Clearance: 8.9 mL/min (A) (by C-G formula based on SCr of 6.45 mg/dL (H)).    Results from last 7 days   Lab Units 11/01/23  0557 10/31/23  0520 10/30/23  0540   MAGNESIUM mg/dL 1.6 1.6 1.5*   PHOSPHORUS mg/dL 3.3 3.1 3.2             Results from last 7 days   Lab Units 11/02/23  1412 11/02/23  1340 11/02/23  1251 11/02/23  1115 11/01/23  0557 10/31/23  0520   WBC 10*3/mm3 4.02  --  2.86* 2.87* 2.05* 2.57*   HEMOGLOBIN g/dL 8.2*  --  5.7* 7.8* 12.9 11.3*   HEMOGLOBIN, POC g/dL  --  8.8*  --   --   --   --    PLATELETS 10*3/mm3 162  --  117* 38* 85* 85*       Results from last 7 days   Lab Units 11/02/23  1412 11/02/23  1313 11/02/23  1251 11/02/23  1115 10/31/23  0520   INR  1.62*  1.4* 1.70* 2.21* 1.05       Assessment / Plan     ASSESSMENT:  End-stage renal disease on secondary to lupus nephritis on peritoneal dialysis, currently on peritoneal dialysis appears to be euvolemic, her chemistry still pending  severe hypertension on admission improved significantly associate with noncompliance, blood pressure improved significantly, currently on nicardipine drip  Hyponatremia associated with excessive water intake, sodium yesterday was 127  Cardiomyopathy ejection fraction about 40%  Valvular heart disease with severe aortic insufficiency with mobile density on the aortic valve, she had CHETNA today she had negative blood culture  Thrombocytopenia, platelet up to 162,000 after platelet transfusion  Anemia of chronic kidney disease hemoglobin down to 8.2  SLE.  Mitral and aortic valve insufficiency with DeBakey type I dissection which is felt to be either subacute or chronic, she underwent repair earlier today    PLAN:  Check labs and decide if the patient need hemodialysis today  Switch to Cleviprex instead of nicardipine because of volume issues  Surveillance lab  I discussed the case with the patient's nurse at the bedside    I reviewed the chart and other providers note, I reviewed imaging and lab data.  Copied text in this note has been reviewed and is accurate as of 11/02/23.       Thank you for involving us in the care of Dee Herrera.  Please feel free to call with any questions.    Isaac Diaz MD  11/02/23  14:48 EDT    [facility]  [phone]    Please note that portions of this note were completed with a voice recognition program.

## 2023-11-02 NOTE — ANESTHESIA PREPROCEDURE EVALUATION
Anesthesia Evaluation                  Airway   Mallampati: II  TM distance: >3 FB  Neck ROM: full  Dental      Pulmonary    (+) ,shortness of breath  Cardiovascular     Rhythm: regular  Rate: normal    (+) hypertension, CHF , pericardial effusion    ROS comment:   Left ventricular ejection fraction appears to be 56 - 60%.  ·  The left ventricular cavity is mildly dilated.  ·  Left ventricular wall thickness is consistent with mild concentric hypertrophy.  ·  Left ventricular diastolic function was not assessed.  ·  Saline test results are negative.  ·  Severe aortic valve regurgitation is present.  ·  Moderate dilation of the aortic root is present. No dilation of the ascending aorta is present. There is an aortic root dissection         Neuro/Psych  (+) seizures, headaches  GI/Hepatic/Renal/Endo    (+) liver disease, renal disease- ESRD and dialysis    Musculoskeletal     Abdominal    Substance History      OB/GYN          Other   blood dyscrasia anemia thrombocytopenia,                       Anesthesia Plan    ASA 4     general, Char, CVL and PAC     (CHETNA)  intravenous induction   Postoperative Plan: Expected vent after surgery  Anesthetic plan, risks, benefits, and alternatives have been provided, discussed and informed consent has been obtained with: patient.    Use of blood products discussed with patient .        CODE STATUS:    Code Status (Patient has no pulse and is not breathing): CPR (Attempt to Resuscitate)  Medical Interventions (Patient has pulse or is breathing): Full Support

## 2023-11-02 NOTE — ANESTHESIA PROCEDURE NOTES
Central Line      Patient reassessed immediately prior to procedure    Patient location during procedure: OR  Indications: central pressure monitoring, vascular access and MD/Surgeon request  Staff  Anesthesiologist: Glen Yusuf MD  Preanesthetic Checklist  Completed: patient identified, IV checked, site marked, risks and benefits discussed, surgical consent, monitors and equipment checked, pre-op evaluation and timeout performed  Central Line Prep  Sterile Tech:gloves, cap, gown, mask and sterile barriers  Prep: chloraprep  Patient monitoring: blood pressure monitoring, continuous pulse oximetry and EKG  Central Line Procedure  Laterality:right  Location:internal jugular  Catheter Type:MAC and Bruning-Ryanne  Catheter Size:9 Fr  Guidance:ultrasound guided  PROCEDURE NOTE/ULTRASOUND INTERPRETATION.  Using ultrasound guidance the potential vascular sites for insertion of the catheter were visualized to determine the patency of the vessel to be used for vascular access.  After selecting the appropriate site for insertion, the needle was visualized under ultrasound being inserted into the internal jugular vein, followed by ultrasound confirmation of wire and catheter placement. There were no abnormalities seen on ultrasound; an image was taken; and the patient tolerated the procedure with no complications. Images: still images obtained, printed/placed on chart  Assessment  Post procedure:biopatch applied, line sutured and occlusive dressing applied  Assessement:blood return through all ports, free fluid flow and chest x-ray ordered  Complications:no  Patient Tolerance:patient tolerated the procedure well with no apparent complications  Additional Notes  A Bruning-Ryanne catheter was brought onto the field and each port flushed with sterile saline. The catheter was introduced into the Cordis catheter to a distance of 20 cm. The balloon was then inflated and the catheter was advanced through the right ventricle and into the  pulmonary artery via pressure waveform tracing  The balloon was then deflated. The catheter was locked into place with the tip inserted to a distance of 52cm and a sterile dressing applied.

## 2023-11-02 NOTE — PLAN OF CARE
Goal Outcome Evaluation:   Pt resting in bed , Aox4 , Room air , SR on tele , NPO since midnight, C/O BLE pain , medicated per order . Skin prep done, PD exchanges dony well. 1 Unit of platelets transfused. VSS, No sign of acute distress noted. Plan of care is ongoing

## 2023-11-02 NOTE — ANESTHESIA PROCEDURE NOTES
Diagnostic IntraOp [facility]    Procedure Performed: Diagnostic IntraOp [facility]       Start Time:        End Time:      Preanesthesia Checklist:  Patient identified, IV assessed, risks and benefits discussed, monitors and equipment assessed, procedure being performed at surgeon's request and anesthesia consent obtained.    General Procedure Information  Diagnostic Indications for Echo:  assessment of surgical repair, defect repair evaluation and hemodynamic monitoring  Physician Requesting Echo: Chris Medina MD  Location performed:  OR  Intubated  Bite block placed  Heart visualized  Probe Insertion:  Easy  Probe Type:  Multiplane  Modalities:  Color flow mapping, continuous wave Doppler and pulse wave Doppler    Echocardiographic and Doppler Measurements    Ventricles    Right Ventricle:  Global function normal.    Left Ventricle:  Cavity size dilated.  Global Function normal.  Ejection Fraction 55%.    Other Ventricular Findings:       Mild LVH inferolateral wall measuring 1cm    Ventricular Regional Function:    Wall Motion Comments:       No regional wall motion abnormalities    Valves    Other Valve Findings:       AV - dilated annulus, severe regurgitation, color flow jet/LVOT width nearly 100%, holodiastolic flow reversal, P1/2t 458ms,     MV - thickened leaflets, trace regurgitation    TV - thickened leaflets, trace regurgitation        Atria      Left Atrium:  Left atrial appendage normal.          Diastolic Function Measurements:  Diastolic Dysfunction Grade= II  E=  ms  A=  ms  E/A Ratio=   DT=  ms  S/D=   IVRT=    Other Findings  Pulmonary Venous Flow:  blunted (decreased) systolic flow    Anesthesia Information  Performed Personally  Anesthesiologist:  Glen Yusuf MD      Echocardiogram Comments:       Post CPB exam: s/p valve sparing root repair (Anselmo procedure)    RV - normal function  LV - EF50%, mild LVH  Diastolic function - grade 2 dysfunction    AV - trace regurgitation @ the right/noncoronary  cusp commissure  MV - trace regurgitation  TV - trace regurgitation    No pericardial effusion. No pleural effusion. No evidence of aortic dissection.

## 2023-11-02 NOTE — PLAN OF CARE
Goal Outcome Evaluation:  Plan of Care Reviewed With: mother, grandparent           Outcome Evaluation: POD0 of aortic root replacement -- kelli procedure. Patient admitted to unit @1400. Family updated at bedside. Lines, tubes, pacing wires in place. Medications per MAR. Will continue with current plan of care & adjust as needed.

## 2023-11-02 NOTE — ANESTHESIA PROCEDURE NOTES
Arterial Line      Patient reassessed immediately prior to procedure    Patient location during procedure: OR   Line placed for hemodynamic monitoring, ABGs/Labs/ISTAT and MD/Surgeon request.  Performed By   Anesthesiologist: Glen Yusuf MD   Preanesthetic Checklist  Completed: patient identified, IV checked, site marked, risks and benefits discussed, surgical consent, monitors and equipment checked, pre-op evaluation and timeout performed  Arterial Line Prep    Sterile Tech: cap, gloves and sterile barriers  Prep: Betadine and ChloraPrep  Patient monitoring: EKG, continuous pulse oximetry and blood pressure monitoring  Arterial Line Procedure   Laterality:right  Location:  brachial artery  Catheter size: 20 G   Guidance: ultrasound guided  PROCEDURE NOTE/ULTRASOUND INTERPRETATION.  Using ultrasound guidance the potential vascular sites for insertion of the catheter were visualized to determine the patency of the vessel to be used for vascular access.  After selecting the appropriate site for insertion, the needle was visualized under ultrasound being inserted into the brachial artery, followed by ultrasound confirmation of wire and catheter placement. There were no abnormalities seen on ultrasound; an image was taken; and the patient tolerated the procedure with no complications.   Number of attempts: 2 attempts on left radial, 1 attempts on right radial, 1 attempt on right brachial.  Successful placement: yes Images: still images not obtained  Post Assessment   Dressing Type: wrist guard applied, secured with tape and occlusive dressing applied.   Complications no  Circ/Move/Sens Assessment: normal and unchanged.   Patient Tolerance: patient tolerated the procedure well with no apparent complications

## 2023-11-02 NOTE — ANESTHESIA POSTPROCEDURE EVALUATION
Patient: Dee Herrera    Procedure Summary       Date: 11/02/23 Room / Location: [address] CARDIOVASCULAR OPERATING ROOM    Anesthesia Start: 0633 Anesthesia Stop: 1407    Procedure: CHETNA STERNOTOMY, AORTIC ROOT REPLACEMENT WITH VALVE SPARING MISHEL PROCEDURE, REPLACEMENT OF ASCENDING AORTA, RIGHT FEMORAL DIALYSIS CATHETER PLACEMENT AND PRP (Chest) Diagnosis:       Severe aortic valve regurgitation      (Severe aortic valve regurgitation [I35.1])    Surgeons: Chris Medina MD Provider: Glen Yusuf MD    Anesthesia Type: general, Char, CVL, PAC ASA Status: 4            Anesthesia Type: general, Kirkman, CVL, PAC    Vitals  Vitals Value Taken Time   /82 11/02/23 1406   Temp 37.4 °C (99.32 °F) 11/02/23 1412   Pulse 92 11/02/23 1412   Resp     SpO2 100 % 11/02/23 1412   Vitals shown include unfiled device data.        Post Anesthesia Care and Evaluation    Patient location during evaluation: bedside  Patient participation: complete - patient cannot participate    Airway patency: patent  Anesthetic complications: No anesthetic complications  PONV Status: NARespiratory status: ETT

## 2023-11-03 ENCOUNTER — APPOINTMENT (OUTPATIENT)
Dept: NEUROLOGY | Facility: HOSPITAL | Age: 33
DRG: 219 | End: 2023-11-03
Payer: MEDICARE

## 2023-11-03 ENCOUNTER — APPOINTMENT (OUTPATIENT)
Dept: CT IMAGING | Facility: HOSPITAL | Age: 33
DRG: 219 | End: 2023-11-03
Payer: MEDICARE

## 2023-11-03 ENCOUNTER — APPOINTMENT (OUTPATIENT)
Dept: GENERAL RADIOLOGY | Facility: HOSPITAL | Age: 33
DRG: 219 | End: 2023-11-03
Payer: MEDICARE

## 2023-11-03 LAB
ALBUMIN SERPL-MCNC: 3.4 G/DL (ref 3.5–5.2)
ALBUMIN SERPL-MCNC: 3.5 G/DL (ref 3.5–5.2)
ALBUMIN/GLOB SERPL: 1.5 G/DL
ALP SERPL-CCNC: 65 U/L (ref 39–117)
ALT SERPL W P-5'-P-CCNC: 12 U/L (ref 1–33)
ANION GAP SERPL CALCULATED.3IONS-SCNC: 16.6 MMOL/L (ref 5–15)
ANION GAP SERPL CALCULATED.3IONS-SCNC: 18 MMOL/L (ref 5–15)
ARTERIAL PATENCY WRIST A: ABNORMAL
AST SERPL-CCNC: 55 U/L (ref 1–32)
ATMOSPHERIC PRESS: 755.8 MMHG
BASE EXCESS BLDA CALC-SCNC: 4.5 MMOL/L (ref 0–2)
BASOPHILS # BLD AUTO: 0.02 10*3/MM3 (ref 0–0.2)
BASOPHILS NFR BLD AUTO: 0.3 % (ref 0–1.5)
BDY SITE: ABNORMAL
BH BB BLOOD EXPIRATION DATE: NORMAL
BH BB BLOOD TYPE BARCODE: 5100
BH BB BLOOD TYPE BARCODE: 6200
BH BB BLOOD TYPE BARCODE: 9500
BH BB BLOOD TYPE BARCODE: 9500
BH BB DISPENSE STATUS: NORMAL
BH BB PRODUCT CODE: NORMAL
BH BB UNIT NUMBER: NORMAL
BILIRUB SERPL-MCNC: 0.2 MG/DL (ref 0–1.2)
BUN SERPL-MCNC: 25 MG/DL (ref 6–20)
BUN SERPL-MCNC: 26 MG/DL (ref 6–20)
BUN/CREAT SERPL: 4.8 (ref 7–25)
BUN/CREAT SERPL: 5 (ref 7–25)
CA-I BLD-MCNC: 4.4 MG/DL (ref 4.6–5.4)
CA-I SERPL ISE-MCNC: 1.1 MMOL/L (ref 1.15–1.35)
CALCIUM SPEC-SCNC: 8.8 MG/DL (ref 8.6–10.5)
CALCIUM SPEC-SCNC: 9 MG/DL (ref 8.6–10.5)
CHLORIDE SERPL-SCNC: 96 MMOL/L (ref 98–107)
CHLORIDE SERPL-SCNC: 99 MMOL/L (ref 98–107)
CO2 BLDA-SCNC: 26.6 MMOL/L (ref 23–27)
CO2 SERPL-SCNC: 24.4 MMOL/L (ref 22–29)
CO2 SERPL-SCNC: 25 MMOL/L (ref 22–29)
CREAT SERPL-MCNC: 5.17 MG/DL (ref 0.57–1)
CREAT SERPL-MCNC: 5.2 MG/DL (ref 0.57–1)
CROSSMATCH INTERPRETATION: NORMAL
CROSSMATCH INTERPRETATION: NORMAL
DEPRECATED RDW RBC AUTO: 51.3 FL (ref 37–54)
EGFRCR SERPLBLD CKD-EPI 2021: 10.7 ML/MIN/1.73
EGFRCR SERPLBLD CKD-EPI 2021: 10.8 ML/MIN/1.73
EOSINOPHIL # BLD AUTO: 0 10*3/MM3 (ref 0–0.4)
EOSINOPHIL NFR BLD AUTO: 0 % (ref 0.3–6.2)
ERYTHROCYTE [DISTWIDTH] IN BLOOD BY AUTOMATED COUNT: 16.8 % (ref 12.3–15.4)
GLOBULIN UR ELPH-MCNC: 2.2 GM/DL
GLUCOSE SERPL-MCNC: 126 MG/DL (ref 65–99)
GLUCOSE SERPL-MCNC: 163 MG/DL (ref 65–99)
HCO3 BLDA-SCNC: 25.8 MMOL/L (ref 22–28)
HCT VFR BLD AUTO: 28.5 % (ref 34–46.6)
HEMODILUTION: NO
HGB BLD-MCNC: 9.3 G/DL (ref 12–15.9)
IMM GRANULOCYTES # BLD AUTO: 0.03 10*3/MM3 (ref 0–0.05)
IMM GRANULOCYTES NFR BLD AUTO: 0.4 % (ref 0–0.5)
INHALED O2 CONCENTRATION: 30 %
INR PPP: 1.2 (ref 0.9–1.1)
LAB AP CASE REPORT: NORMAL
LYMPHOCYTES # BLD AUTO: 0.33 10*3/MM3 (ref 0.7–3.1)
LYMPHOCYTES NFR BLD AUTO: 4.7 % (ref 19.6–45.3)
Lab: ABNORMAL
MAGNESIUM SERPL-MCNC: 2.6 MG/DL (ref 1.6–2.6)
MCH RBC QN AUTO: 28.2 PG (ref 26.6–33)
MCHC RBC AUTO-ENTMCNC: 32.6 G/DL (ref 31.5–35.7)
MCV RBC AUTO: 86.4 FL (ref 79–97)
MODALITY: ABNORMAL
MONOCYTES # BLD AUTO: 0.47 10*3/MM3 (ref 0.1–0.9)
MONOCYTES NFR BLD AUTO: 6.8 % (ref 5–12)
NEUTROPHILS NFR BLD AUTO: 6.1 10*3/MM3 (ref 1.7–7)
NEUTROPHILS NFR BLD AUTO: 87.8 % (ref 42.7–76)
NOTIFIED WHO: ABNORMAL
NRBC BLD AUTO-RTO: 0 /100 WBC (ref 0–0.2)
O2 A-A PPRESDIFF RESPIRATORY: 0.6 MMHG
PATH REPORT.FINAL DX SPEC: NORMAL
PATH REPORT.GROSS SPEC: NORMAL
PCO2 BLDA: 26.3 MM HG (ref 35–45)
PEEP RESPIRATORY: 5 CM[H2O]
PH BLDA: 7.6 PH UNITS (ref 7.35–7.45)
PHOSPHATE SERPL-MCNC: 3.1 MG/DL (ref 2.5–4.5)
PHOSPHATE SERPL-MCNC: 3.1 MG/DL (ref 2.5–4.5)
PLATELET # BLD AUTO: 172 10*3/MM3 (ref 140–450)
PMV BLD AUTO: 11.5 FL (ref 6–12)
PO2 BLDA: 125.5 MM HG (ref 80–100)
POTASSIUM SERPL-SCNC: 4.4 MMOL/L (ref 3.5–5.2)
POTASSIUM SERPL-SCNC: 4.5 MMOL/L (ref 3.5–5.2)
PROT SERPL-MCNC: 5.6 G/DL (ref 6–8.5)
PROTHROMBIN TIME: 15.4 SECONDS (ref 11.7–14.2)
QT INTERVAL: 406 MS
QT INTERVAL: 446 MS
QTC INTERVAL: 489 MS
QTC INTERVAL: 497 MS
RBC # BLD AUTO: 3.3 10*6/MM3 (ref 3.77–5.28)
READ BACK: YES
SAO2 % BLDCOA: 99.4 % (ref 92–98.5)
SET MECH RESP RATE: 12
SODIUM SERPL-SCNC: 139 MMOL/L (ref 136–145)
SODIUM SERPL-SCNC: 140 MMOL/L (ref 136–145)
TOTAL RATE: 17 BREATHS/MINUTE
UNIT  ABO: NORMAL
UNIT  RH: NORMAL
VENTILATOR MODE: AC
VT ON VENT VENT: 510 ML
WBC NRBC COR # BLD: 6.95 10*3/MM3 (ref 3.4–10.8)

## 2023-11-03 PROCEDURE — 25010000002 CALCIUM GLUCONATE 2-0.675 GM/100ML-% SOLUTION: Performed by: NURSE PRACTITIONER

## 2023-11-03 PROCEDURE — 94003 VENT MGMT INPAT SUBQ DAY: CPT

## 2023-11-03 PROCEDURE — 82803 BLOOD GASES ANY COMBINATION: CPT

## 2023-11-03 PROCEDURE — 94002 VENT MGMT INPAT INIT DAY: CPT

## 2023-11-03 PROCEDURE — 93005 ELECTROCARDIOGRAM TRACING: CPT | Performed by: NURSE PRACTITIONER

## 2023-11-03 PROCEDURE — 74018 RADEX ABDOMEN 1 VIEW: CPT

## 2023-11-03 PROCEDURE — 25010000002 HEPARIN (PORCINE) PER 1000 UNITS: Performed by: INTERNAL MEDICINE

## 2023-11-03 PROCEDURE — 93010 ELECTROCARDIOGRAM REPORT: CPT | Performed by: INTERNAL MEDICINE

## 2023-11-03 PROCEDURE — P9041 ALBUMIN (HUMAN),5%, 50ML: HCPCS | Performed by: NURSE PRACTITIONER

## 2023-11-03 PROCEDURE — C9248 INJ, CLEVIDIPINE BUTYRATE: HCPCS | Performed by: NURSE PRACTITIONER

## 2023-11-03 PROCEDURE — 25010000002 HYDRALAZINE PER 20 MG: Performed by: NURSE PRACTITIONER

## 2023-11-03 PROCEDURE — 99223 1ST HOSP IP/OBS HIGH 75: CPT | Performed by: STUDENT IN AN ORGANIZED HEALTH CARE EDUCATION/TRAINING PROGRAM

## 2023-11-03 PROCEDURE — 83735 ASSAY OF MAGNESIUM: CPT | Performed by: NURSE PRACTITIONER

## 2023-11-03 PROCEDURE — 94761 N-INVAS EAR/PLS OXIMETRY MLT: CPT

## 2023-11-03 PROCEDURE — 70450 CT HEAD/BRAIN W/O DYE: CPT

## 2023-11-03 PROCEDURE — 94799 UNLISTED PULMONARY SVC/PX: CPT

## 2023-11-03 PROCEDURE — 25010000002 LORAZEPAM PER 2 MG

## 2023-11-03 PROCEDURE — 5A1D70Z PERFORMANCE OF URINARY FILTRATION, INTERMITTENT, LESS THAN 6 HOURS PER DAY: ICD-10-PCS | Performed by: INTERNAL MEDICINE

## 2023-11-03 PROCEDURE — 82330 ASSAY OF CALCIUM: CPT | Performed by: NURSE PRACTITIONER

## 2023-11-03 PROCEDURE — 25010000002 ALBUMIN HUMAN 5% PER 50 ML: Performed by: NURSE PRACTITIONER

## 2023-11-03 PROCEDURE — 25010000002 LEVETRIRACETAM PER 10 MG

## 2023-11-03 PROCEDURE — 95813 EEG EXTND MNTR 61-119 MIN: CPT | Performed by: STUDENT IN AN ORGANIZED HEALTH CARE EDUCATION/TRAINING PROGRAM

## 2023-11-03 PROCEDURE — 25010000002 CLEVIDIPINE BUTYRATE PER 1 MG: Performed by: NURSE PRACTITIONER

## 2023-11-03 PROCEDURE — 25010000002 METOCLOPRAMIDE PER 10 MG: Performed by: NURSE PRACTITIONER

## 2023-11-03 PROCEDURE — 71045 X-RAY EXAM CHEST 1 VIEW: CPT

## 2023-11-03 PROCEDURE — 95714 VEEG EA 12-26 HR UNMNTR: CPT

## 2023-11-03 PROCEDURE — 25010000002 CEFAZOLIN IN DEXTROSE 2-4 GM/100ML-% SOLUTION: Performed by: NURSE PRACTITIONER

## 2023-11-03 PROCEDURE — 99024 POSTOP FOLLOW-UP VISIT: CPT | Performed by: THORACIC SURGERY (CARDIOTHORACIC VASCULAR SURGERY)

## 2023-11-03 PROCEDURE — 84100 ASSAY OF PHOSPHORUS: CPT | Performed by: INTERNAL MEDICINE

## 2023-11-03 PROCEDURE — 85610 PROTHROMBIN TIME: CPT | Performed by: NURSE PRACTITIONER

## 2023-11-03 PROCEDURE — 95720 EEG PHY/QHP EA INCR W/VEEG: CPT | Performed by: PSYCHIATRY & NEUROLOGY

## 2023-11-03 PROCEDURE — 85025 COMPLETE CBC W/AUTO DIFF WBC: CPT | Performed by: NURSE PRACTITIONER

## 2023-11-03 PROCEDURE — 99232 SBSQ HOSP IP/OBS MODERATE 35: CPT | Performed by: INTERNAL MEDICINE

## 2023-11-03 PROCEDURE — 80053 COMPREHEN METABOLIC PANEL: CPT | Performed by: INTERNAL MEDICINE

## 2023-11-03 PROCEDURE — 99222 1ST HOSP IP/OBS MODERATE 55: CPT | Performed by: NURSE PRACTITIONER

## 2023-11-03 PROCEDURE — 95813 EEG EXTND MNTR 61-119 MIN: CPT

## 2023-11-03 RX ORDER — LORAZEPAM 2 MG/ML
2 INJECTION INTRAMUSCULAR ONCE
Status: COMPLETED | OUTPATIENT
Start: 2023-11-03 | End: 2023-11-03

## 2023-11-03 RX ORDER — CALCIUM GLUCONATE 20 MG/ML
2000 INJECTION, SOLUTION INTRAVENOUS ONCE
Status: COMPLETED | OUTPATIENT
Start: 2023-11-03 | End: 2023-11-03

## 2023-11-03 RX ORDER — ASPIRIN 81 MG/1
81 TABLET, CHEWABLE ORAL DAILY
Status: DISCONTINUED | OUTPATIENT
Start: 2023-11-03 | End: 2023-11-22

## 2023-11-03 RX ORDER — LEVETIRACETAM 500 MG/5ML
500 INJECTION, SOLUTION, CONCENTRATE INTRAVENOUS EVERY 12 HOURS SCHEDULED
Status: DISCONTINUED | OUTPATIENT
Start: 2023-11-03 | End: 2023-11-03

## 2023-11-03 RX ORDER — LEVETIRACETAM 500 MG/5ML
500 INJECTION, SOLUTION, CONCENTRATE INTRAVENOUS EVERY 24 HOURS
Status: DISCONTINUED | OUTPATIENT
Start: 2023-11-03 | End: 2023-11-05

## 2023-11-03 RX ORDER — CARVEDILOL 3.12 MG/1
3.12 TABLET ORAL EVERY 12 HOURS
Status: DISCONTINUED | OUTPATIENT
Start: 2023-11-03 | End: 2023-11-04

## 2023-11-03 RX ORDER — HEPARIN SODIUM 1000 [USP'U]/ML
3000 INJECTION, SOLUTION INTRAVENOUS; SUBCUTANEOUS AS NEEDED
Status: DISCONTINUED | OUTPATIENT
Start: 2023-11-03 | End: 2023-12-09 | Stop reason: HOSPADM

## 2023-11-03 RX ORDER — LEVETIRACETAM 500 MG/5ML
250 INJECTION, SOLUTION, CONCENTRATE INTRAVENOUS ONCE
Status: DISCONTINUED | OUTPATIENT
Start: 2023-11-03 | End: 2023-11-03

## 2023-11-03 RX ORDER — LEVETIRACETAM 500 MG/5ML
1000 INJECTION, SOLUTION, CONCENTRATE INTRAVENOUS ONCE
Status: COMPLETED | OUTPATIENT
Start: 2023-11-03 | End: 2023-11-03

## 2023-11-03 RX ORDER — HYDRALAZINE HYDROCHLORIDE 20 MG/ML
20 INJECTION INTRAMUSCULAR; INTRAVENOUS EVERY 6 HOURS PRN
Status: DISCONTINUED | OUTPATIENT
Start: 2023-11-03 | End: 2023-11-04

## 2023-11-03 RX ADMIN — CLEVIPIDINE 18 MG/HR: 0.5 EMULSION INTRAVENOUS at 07:31

## 2023-11-03 RX ADMIN — CLEVIPIDINE 18 MG/HR: 0.5 EMULSION INTRAVENOUS at 05:41

## 2023-11-03 RX ADMIN — CARVEDILOL 3.12 MG: 3.12 TABLET, FILM COATED ORAL at 21:46

## 2023-11-03 RX ADMIN — CARVEDILOL 3.12 MG: 3.12 TABLET, FILM COATED ORAL at 09:02

## 2023-11-03 RX ADMIN — CALCIUM GLUCONATE 2000 MG: 20 INJECTION, SOLUTION INTRAVENOUS at 13:14

## 2023-11-03 RX ADMIN — CLEVIPIDINE 12 MG/HR: 0.5 EMULSION INTRAVENOUS at 10:24

## 2023-11-03 RX ADMIN — 0.12% CHLORHEXIDINE GLUCONATE 15 ML: 1.2 RINSE ORAL at 20:29

## 2023-11-03 RX ADMIN — HEPARIN SODIUM 3000 UNITS: 1000 INJECTION INTRAVENOUS; SUBCUTANEOUS at 13:57

## 2023-11-03 RX ADMIN — SENNOSIDES AND DOCUSATE SODIUM 2 TABLET: 50; 8.6 TABLET ORAL at 20:30

## 2023-11-03 RX ADMIN — 0.12% CHLORHEXIDINE GLUCONATE 15 ML: 1.2 RINSE ORAL at 09:02

## 2023-11-03 RX ADMIN — HYDRALAZINE HYDROCHLORIDE 20 MG: 20 INJECTION, SOLUTION INTRAMUSCULAR; INTRAVENOUS at 14:37

## 2023-11-03 RX ADMIN — LEVETIRACETAM 1000 MG: 100 INJECTION INTRAVENOUS at 01:26

## 2023-11-03 RX ADMIN — CLEVIPIDINE 18 MG/HR: 0.5 EMULSION INTRAVENOUS at 01:34

## 2023-11-03 RX ADMIN — ACETAMINOPHEN 650 MG: 160 SOLUTION ORAL at 14:28

## 2023-11-03 RX ADMIN — ACETAMINOPHEN 650 MG: 160 SOLUTION ORAL at 18:46

## 2023-11-03 RX ADMIN — LEVETIRACETAM 500 MG: 100 INJECTION INTRAVENOUS at 09:02

## 2023-11-03 RX ADMIN — ALBUMIN (HUMAN) 250 ML: 12.5 INJECTION, SOLUTION INTRAVENOUS at 13:13

## 2023-11-03 RX ADMIN — CLEVIPIDINE 18 MG/HR: 0.5 EMULSION INTRAVENOUS at 04:10

## 2023-11-03 RX ADMIN — METOCLOPRAMIDE 5 MG: 5 INJECTION, SOLUTION INTRAMUSCULAR; INTRAVENOUS at 09:02

## 2023-11-03 RX ADMIN — ATORVASTATIN CALCIUM 40 MG: 20 TABLET, FILM COATED ORAL at 20:30

## 2023-11-03 RX ADMIN — LORAZEPAM 2 MG: 2 INJECTION INTRAMUSCULAR; INTRAVENOUS at 01:12

## 2023-11-03 RX ADMIN — CALCIUM GLUCONATE 2000 MG: 20 INJECTION, SOLUTION INTRAVENOUS at 09:02

## 2023-11-03 RX ADMIN — MUPIROCIN 1 APPLICATION: 20 OINTMENT TOPICAL at 20:30

## 2023-11-03 RX ADMIN — MUPIROCIN 1 APPLICATION: 20 OINTMENT TOPICAL at 09:03

## 2023-11-03 RX ADMIN — CLEVIPIDINE 18 MG/HR: 0.5 EMULSION INTRAVENOUS at 09:06

## 2023-11-03 RX ADMIN — ASPIRIN 81 MG: 81 TABLET, CHEWABLE ORAL at 09:02

## 2023-11-03 RX ADMIN — CEFAZOLIN SODIUM 2000 MG: 2 INJECTION, SOLUTION INTRAVENOUS at 18:47

## 2023-11-03 RX ADMIN — CLEVIPIDINE 20 MG/HR: 0.5 EMULSION INTRAVENOUS at 00:21

## 2023-11-03 NOTE — PROGRESS NOTES
[facility] Progress Note      Patient Name: Dee Herrera  : 1990  MRN: [id number]  Primary Care Physician:  Margarita Woods APRN  Date of admission: 10/26/2023    Subjective     Interval History:   Follow-up end-stage renal disease on peritoneal dialysis    The patient currently on the ventilator she is postoperative repair of her type I aortic dissection.  She had a right femoral temporary dialysis catheter placed.  Her chemistry still pending.  Currently on Cleviprex drip.  She had multiple seizures last night    Review of Systems:   As noted above    Objective     Vitals:   Temp:  [97 °F (36.1 °C)-99.3 °F (37.4 °C)] 97.5 °F (36.4 °C)  Heart Rate:  [65-94] 65  Resp:  [14-15] 14  BP: ()/(74-96) 100/75  FiO2 (%):  [40 %] 40 %    Intake/Output Summary (Last 24 hours) at 11/3/2023 0843  Last data filed at 11/3/2023 0600  Gross per 24 hour   Intake 6799.28 ml   Output 1730 ml   Net 5069.28 ml       Physical Exam:    General Appearance: On the ventilator, unresponsive, chronically ill and frail  Skin: warm and dry  HEENT: Orally intubated  Neck: [place] catheter in the right IJ  Lungs: CTA, unlabored breathing effort  Heart: RRR, normal S1 and S2, no rub  Abdomen: soft, no guarding nondistended, normoactive bowels and PD catheter in place with clean exit site  Extremities: no edema, cyanosis or clubbing, temporary dialysis catheter in the right femoral vein  Neuro: Unable to assess    Scheduled Meds:     acetaminophen, 1,000 mg, Intravenous, Q8H  acetaminophen, 650 mg, Oral, Q4H   Or  acetaminophen, 650 mg, Oral, Q4H   Or  acetaminophen, 650 mg, Rectal, Q4H  aspirin, 81 mg, Oral, Daily  atorvastatin, 40 mg, Oral, Nightly  calcium gluconate, 2,000 mg, Intravenous, Once  ceFAZolin, 2,000 mg, Intravenous, Q24H  chlorhexidine, 15 mL, Mouth/Throat, Q12H  heparin (porcine), 5,000 Units, Subcutaneous, Q8H  levETIRAcetam, 500 mg, Intravenous, Q24H  metoclopramide, 5 mg,  Intravenous, Q6H  metoprolol tartrate, 12.5 mg, Oral, Q12H  mupirocin, , Each Nare, BID  pantoprazole, 40 mg, Oral, QAM  senna-docusate sodium, 2 tablet, Oral, Nightly      IV Meds:   clevidipine, 2-32 mg/hr, Last Rate: 18 mg/hr (11/03/23 0731)  dexmedetomidine, 0.2-1.5 mcg/kg/hr, Last Rate: Stopped (11/02/23 2000)  DOPamine, 2-20 mcg/kg/min  EPINEPHrine, 0.02-0.1 mcg/kg/min  insulin, 0-100 Units/hr  milrinone, 0.25-0.375 mcg/kg/min  niCARdipine, 5-15 mg/hr, Last Rate: Stopped (11/02/23 1445)  nitroglycerin, 5-200 mcg/min  norepinephrine, 0.02-0.2 mcg/kg/min  Pharmacy Consult - Pharmacy to dose,   phenylephrine, 0.2-2 mcg/kg/min  propofol, 5-50 mcg/kg/min, Last Rate: Stopped (11/03/23 0430)  sodium chloride, 30 mL/hr, Last Rate: 30 mL/hr (11/02/23 1400)        Results Reviewed:   I have personally reviewed the results from the time of this admission to 11/3/2023 08:43 EDT     Results from last 7 days   Lab Units 11/03/23  0407 11/02/23  2351 11/02/23  1736   SODIUM mmol/L 140 139 141   POTASSIUM mmol/L 4.5 4.4 4.1   CHLORIDE mmol/L 99 96* 98   CO2 mmol/L 24.4 25.0 28.0   BUN mg/dL 26* 25* 21*   CREATININE mg/dL 5.20* 5.17* 5.02*   CALCIUM mg/dL 8.8 9.0 9.0   BILIRUBIN mg/dL 0.2  --   --    ALK PHOS U/L 65  --   --    ALT (SGPT) U/L 12  --   --    AST (SGOT) U/L 55*  --   --    GLUCOSE mg/dL 126* 163* 132*       Estimated Creatinine Clearance: 13.4 mL/min (A) (by C-G formula based on SCr of 5.2 mg/dL (H)).    Results from last 7 days   Lab Units 11/03/23  0407 11/02/23  2351 11/02/23  1736 11/02/23  1412   MAGNESIUM mg/dL 2.6  --  2.4 2.5   PHOSPHORUS mg/dL 3.1 3.1 2.7 2.7             Results from last 7 days   Lab Units 11/03/23  0407 11/02/23  1736 11/02/23  1412 11/02/23  1340 11/02/23  1251 11/02/23  1115   WBC 10*3/mm3 6.95 5.07 4.02  --  2.86* 2.87*   HEMOGLOBIN g/dL 9.3* 9.0* 8.2*  --  5.7* 7.8*   HEMOGLOBIN, POC g/dL  --   --   --  8.8*  --   --    PLATELETS 10*3/mm3 172 186 162  --  117* 38*       Results  from last 7 days   Lab Units 11/03/23  0407 11/02/23  1412 11/02/23  1313 11/02/23  1251 11/02/23  1115   INR  1.20* 1.62* 1.4* 1.70* 2.21*       Assessment / Plan     ASSESSMENT:  End-stage renal disease on secondary to lupus nephritis on peritoneal dialysis, currently on peritoneal dialysis appears to be euvolemic, potassium 4.5, appears to be euvolemic, creatinine 5.2 sodium is 140  severe hypertension on admission improved significantly associate with noncompliance, blood pressure improved significantly, currently on nicardipine drip  Hyponatremia associated with excessive water intake, sodium is 140 today  Cardiomyopathy ejection fraction about 40%  Thrombocytopenia, platelet up to 172,000 after platelet transfusion  Anemia of chronic kidney disease hemoglobin today is 9.3  SLE.  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  Copied text in this note has been reviewed and is accurate as of 11/03/23.       Thank you for involving us in the care of Dee Herrera.  Please feel free to call with any questions.    Isaac Diaz MD  11/03/23  08:43 EDT    [facility]  [phone]    Please note that portions of this note were completed with a voice recognition program.

## 2023-11-03 NOTE — SIGNIFICANT NOTE
11/03/23 1038   OTHER   Discipline physical therapist   Rehab Time/Intention   Session Not Performed other (see comments)  (pt not appropriate for PT at this time, she is still on vent, discussed w RN. Will follow up tomorrow if appropriate.)   Recommendation   PT - Next Appointment 11/04/23

## 2023-11-03 NOTE — CONSULTS
[facility], [place], operated by [facility] NEUROSURGERY CONSULT NOTE    Patient name: Dee Herrera  Referring Provider: Dr. Silva  Reason for Consultation:     Subdural hematoma on CT       Patient Care Team:  Margarita Woods APRN as PCP - General (Nurse Practitioner)  Winnie Sanchez MD as Referring Physician (Obstetrics and Gynecology)  Norberto Almaraz MD PhD as Consulting Physician (Hematology and Oncology)  Lupis Thomas MD as Consulting Physician (Nephrology)  Hair Mckeon MD as Consulting Physician (Nephrology)  Juana Taylor MD as Consulting Physician (Cardiology)    Chief complaint: seizure    Subjective .     History of present illness:    Patient is a 33 y.o.  female with multiple chronic medical issues for her young age including end-stage renal disease on hemodialysis, asthma, hypertension, chronic diastolic heart failure with pulmonary hypertension, chronic thrombocytopenia, lupus with immunosuppression agents, and history of seizure disorder.  Patient admitted with shortness of air and hyponatremia.  He was found to have some progressive changes on EKG and echocardiogram.  Cardiothoracic surgery became involved and CHETNA revealed aortic root/ascending aneurysm with dissection resulting aortic regurgitation.  She is status post AAA dissection repair yesterday.  Overnight she began to have seizure-like activity which ceased with Keppra.  She was evaluated by neurology service this morning and CT head was obtained which revealed an acute subdural hematoma.  We have been asked to evaluate for this.  Patient is currently on aspirin 81 mg.  No cancer history.  No history of stroke.  History of prior tobacco use in the cigars as well as marijuana.  Patient is intubated and unable to give any further history.  All history obtained from the discussion with Dr. Medina at bedside.  He reports patient had thrombocytopenia preop that was fairly significant and received multiple units of platelets intraoperatively.  She  also had elevated INR intraoperatively and received FFP and cryoprecipitate.  She did receive 81 mg aspirin this morning prior to her CT scan.    Review of Systems  Review of Systems   Unable to perform ROS: Intubated       History  PAST MEDICAL HISTORY  Past Medical History:   Diagnosis Date    Anasarca     PER CT SCAN    Dry skin     ESRD (end stage renal disease) on dialysis     TUES, THURS, SAT [address]    History of abdominal pain     History of anemia     History of transfusion     Hypertension     Iron deficiency anemia 09/27/2021    Lupus (systemic lupus erythematosus) 07/30/2022    Migraine     Other specified nutritional anemias     Pericardial effusion     Renal insufficiency     Seizures     STATES LAST WAS 1/2023    Shortness of breath     OCCASIONAL    Vitamin D deficiency 09/27/2021       PAST SURGICAL HISTORY  Past Surgical History:   Procedure Laterality Date    ASCENDING AORTIC ANEURYSM REPAIR W/ MECHANICAL AORTIC VALVE REPLACEMENT N/A 11/2/2023    Procedure: CHETNA STERNOTOMY, AORTIC ROOT REPLACEMENT WITH VALVE SPARING MISHEL PROCEDURE, REPLACEMENT OF ASCENDING AORTA, RIGHT FEMORAL DIALYSIS CATHETER PLACEMENT AND PRP;  Surgeon: Chris Medina MD;  Location: [facility] CVOR;  Service: Cardiothoracic;  Laterality: N/A;    CARDIAC CATHETERIZATION N/A 06/14/2023    Procedure: Coronary angiography;  Surgeon: Juana Taylor MD;  Location:  [facility];  Service: Cardiovascular;  Laterality: N/A;    CARDIAC CATHETERIZATION N/A 06/14/2023    Procedure: Left heart cath;  Surgeon: Juana Taylor MD;  Location:  [facility];  Service: Cardiovascular;  Laterality: N/A;    CARDIAC CATHETERIZATION N/A 06/14/2023    Procedure: Right Heart Cath;  Surgeon: Juana Taylor MD;  Location:  [facility];  Service: Cardiovascular;  Laterality: N/A;    COLONOSCOPY N/A 7/20/2023    Procedure: COLONOSCOPY to cecum with biopsy;  Surgeon: Drew Kaminski MD;   Location: [facility] ENDOSCOPY;  Service: Gastroenterology;  Laterality: N/A;  PRE - diarrhea, constipation  POST - fair prep, normal    ENDOSCOPY N/A 7/20/2023    Procedure: ESOPHAGOGASTRODUODENOSCOPY with biopsy;  Surgeon: Drew Kaminski MD;  Location: [facility] ENDOSCOPY;  Service: Gastroenterology;  Laterality: N/A;  PRE - abn ct abd  POST - gastritis    INSERTION HEMODIALYSIS CATHETER N/A 07/26/2022    Procedure: RIGHT TUNNELED DIALYSIS CATHETER PLACEMENT;  Surgeon: Diandra Adhikari MD;  Location: [facility] MAIN OR;  Service: Vascular;  Laterality: N/A;    INSERTION PERITONEAL DIALYSIS CATHETER N/A 04/03/2023    Procedure: INSERTION PERITONEAL DIALYSIS CATHETER LAPAROSCOPIC, omentumpexy;  Surgeon: Jemal Loyola MD;  Location: [facility] MAIN OR;  Service: General;  Laterality: N/A;    TONSILLECTOMY         FAMILY HISTORY  Family History   Problem Relation Age of Onset    Autoimmune disease Mother     Anemia Mother     Diabetes Sister     Anemia Brother     Diabetes Maternal Grandmother     Hypertension Maternal Grandmother     Cancer Maternal Grandmother     Sickle cell anemia Cousin     Malig Hyperthermia Neg Hx        SOCIAL HISTORY  Social History     Tobacco Use    Smoking status: Former     Types: Cigars     Passive exposure: Past    Smokeless tobacco: Never    Tobacco comments:     Patient smoked black & mild   Vaping Use    Vaping Use: Never used   Substance Use Topics    Alcohol use: Yes     Comment: social    Drug use: Yes     Types: Marijuana     Comment: OCCASIONAL     single  part time job doing work at [employer]    Allergies:  Minoxidil    MEDICATIONS:  Medications Prior to Admission   Medication Sig Dispense Refill Last Dose    carvedilol (COREG) 25 MG tablet Take 1 tablet by mouth Every 12 (Twelve) Hours. 60 tablet 0 10/25/2023    famotidine (PEPCID) 20 MG tablet Take 1 tablet by mouth Daily. 30 tablet 0 10/25/2023    hydroxychloroquine (PLAQUENIL) 200 MG tablet Take 1 tablet by mouth Daily.    10/25/2023    mycophenolate (CELLCEPT) 500 MG tablet Take 0.5 tablets by mouth Every 12 (Twelve) Hours. 30 tablet 0 10/25/2023    ondansetron (Zofran) 4 MG tablet Take 1 tablet by mouth Every 8 (Eight) Hours As Needed for Nausea or Vomiting. 30 tablet 1 10/25/2023    predniSONE (DELTASONE) 10 MG tablet Take 1 tablet by mouth Daily. 30 tablet 0 10/25/2023    sevelamer (RENVELA) 800 MG tablet Take 1 tablet by mouth 3 (Three) Times a Day With Meals.   10/25/2023    NIFEdipine XL (PROCARDIA XL) 90 MG 24 hr tablet Take 1 tablet by mouth Daily. (Patient not taking: Reported on 10/26/2023) 30 tablet 0 Not Taking    polyethylene glycol (MIRALAX) 17 GM/SCOOP powder Take 17 g by mouth As Needed. (Patient not taking: Reported on 10/26/2023)   Not Taking    potassium chloride 10 MEQ CR tablet Take 1 tablet by mouth Daily.   More than a month    sennosides-docusate (PERICOLACE) 8.6-50 MG per tablet Take 2 tablets by mouth Daily As Needed for Constipation. (Patient not taking: Reported on 10/26/2023) 30 tablet 1 Not Taking    simethicone (MYLICON) 80 MG chewable tablet Chew 1 tablet Every 6 (Six) Hours As Needed. (Patient not taking: Reported on 10/26/2023)   Not Taking       Current Facility-Administered Medications:     acetaminophen (OFIRMEV) injection 1,000 mg, 1,000 mg, Intravenous, Q8H, Delia Stern, CAESAR, 1,000 mg at 11/02/23 2202    acetaminophen (TYLENOL) tablet 650 mg, 650 mg, Oral, Q4H **OR** acetaminophen (TYLENOL) 160 MG/5ML oral solution 650 mg, 650 mg, Oral, Q4H **OR** acetaminophen (TYLENOL) suppository 650 mg, 650 mg, Rectal, Q4H, Delia Stern, APRN    acetaminophen (TYLENOL) tablet 650 mg, 650 mg, Oral, Q4H PRN **OR** acetaminophen (TYLENOL) 160 MG/5ML oral solution 650 mg, 650 mg, Oral, Q4H PRN **OR** acetaminophen (TYLENOL) suppository 650 mg, 650 mg, Rectal, Q4H PRN, Delia Stern, CAESAR    albumin human 5 % solution 1,000 mL, 1,000 mL, Intravenous, PRN, Delia Stern, CAESAR    ALPRAZolam (XANAX)  tablet 0.25 mg, 0.25 mg, Oral, Q8H PRN, Delia Stern APRN    aspirin chewable tablet 81 mg, 81 mg, Oral, Daily, Delia Stern APRN, 81 mg at 11/03/23 0902    atorvastatin (LIPITOR) tablet 40 mg, 40 mg, Oral, Nightly, Delia Stern APRN    bisacodyl (DULCOLAX) EC tablet 10 mg, 10 mg, Oral, Daily PRN, Delia Stern APRN    bisacodyl (DULCOLAX) suppository 10 mg, 10 mg, Rectal, Daily PRN, Delia Stern APRN    carvedilol (COREG) tablet 3.125 mg, 3.125 mg, Oral, Q12H, Delia Stern APRN, 3.125 mg at 11/03/23 0902    ceFAZolin in dextrose (ANCEF) IVPB solution 2,000 mg, 2,000 mg, Intravenous, Q24H, Delia Stern APRN, 2,000 mg at 11/02/23 1740    chlorhexidine (PERIDEX) 0.12 % solution 15 mL, 15 mL, Mouth/Throat, Q12H, Delia Stern APRN, 15 mL at 11/03/23 0902    clevidipine (CLEVIPREX) infusion 0.5 mg/mL, 2-32 mg/hr, Intravenous, Continuous PRN, Delia Stern APRN, Last Rate: 16 mL/hr at 11/03/23 1124, 8 mg/hr at 11/03/23 1124    cyclobenzaprine (FLEXERIL) tablet 10 mg, 10 mg, Oral, Q8H PRN, Delia Stern APRN    dexmedetomidine (PRECEDEX) 400 mcg in 100 mL NS infusion, 0.2-1.5 mcg/kg/hr, Intravenous, Titrated, Delia Stern APRN, Stopped at 11/02/23 2000    dextrose (D50W) (25 g/50 mL) IV injection 10-50 mL, 10-50 mL, Intravenous, Q15 Min PRN, Delia Stern APRN    dextrose (GLUTOSE) oral gel 15 g, 15 g, Oral, Q15 Min PRN, Delia Stern APRN    DOPamine 400 mg in 250 mL D5W infusion, 2-20 mcg/kg/min, Intravenous, Continuous PRN, Delia Stern APRN    EPINEPHrine 5 mg in 250 mL NS infusion, 0.02-0.1 mcg/kg/min, Intravenous, Continuous PRN, Delia Stern APRN    glucagon (GLUCAGEN) injection 1 mg, 1 mg, Intramuscular, Q15 Min PRN, Delia Stern APRN    heparin (porcine) 5000 UNIT/ML injection 5,000 Units, 5,000 Units, Subcutaneous, Q8H, Delia Stern APRN    hydrALAZINE (APRESOLINE) injection 20 mg, 20 mg, Intravenous, Q6H PRN, Delia Stern APRN     HYDROcodone-acetaminophen (NORCO) 5-325 MG per tablet 2 tablet, 2 tablet, Oral, Q4H PRN, Delia Stern APRN    insulin regular 1 unit/mL in 0.9% sodium chloride (Glucommander), 0-100 Units/hr, Intravenous, Titrated, Delia Stern APRN    levETIRAcetam (KEPPRA) injection 250 mg, 250 mg, Intravenous, Once, Mario Lowe MD    levETIRAcetam (KEPPRA) injection 500 mg, 500 mg, Intravenous, Q24H, Mario Lowe MD, 500 mg at 11/03/23 0902    metoclopramide (REGLAN) injection 5 mg, 5 mg, Intravenous, Q6H, Delia Stern APRN, 5 mg at 11/03/23 0902    midazolam (VERSED) injection 2 mg, 2 mg, Intravenous, Q1H PRN, Delia Stern APRN    milrinone (PRIMACOR) 20 mg in 100 mL D5W infusion, 0.25-0.375 mcg/kg/min, Intravenous, Continuous PRN, Delia Stern APRN    morphine injection 1 mg, 1 mg, Intravenous, Q4H PRN **AND** naloxone (NARCAN) injection 0.4 mg, 0.4 mg, Intravenous, Q5 Min PRN, Delia Stern APRN    morphine injection 4 mg, 4 mg, Intravenous, Q30 Min PRN, Delia Stern APRN    mupirocin (BACTROBAN) 2 % nasal ointment, , Each Nare, BID, Delia Stern APRN, 1 application  at 11/03/23 0903    niCARdipine (CARDENE) 25 mg in 250 mL NS infusion kit, 5-15 mg/hr, Intravenous, Continuous PRN, Delia Stern APRN, Stopped at 11/02/23 1445    nitroglycerin (NITROSTAT) SL tablet 0.4 mg, 0.4 mg, Sublingual, Q5 Min PRN, Delia Stern APRN    nitroglycerin (TRIDIL) 200 mcg/ml infusion, 5-200 mcg/min, Intravenous, Titrated, Delia Stern APRN    norepinephrine (LEVOPHED) 8 mg in 250 mL NS infusion (premix), 0.02-0.2 mcg/kg/min, Intravenous, Continuous PRN, Delia Stern APRN    ondansetron (ZOFRAN) injection 4 mg, 4 mg, Intravenous, Q6H PRN, Delia Stern APRN    oxyCODONE (ROXICODONE) immediate release tablet 10 mg, 10 mg, Oral, Q4H PRN, Delia Stern APRN    [COMPLETED] pantoprazole (PROTONIX) injection 40 mg, 40 mg, Intravenous, Once, 40 mg at 11/02/23 1543  **FOLLOWED BY** pantoprazole (PROTONIX) EC tablet 40 mg, 40 mg, Oral, QAM, Delia Stern APRN    Pharmacy Consult - Pharmacy to dose, , Does not apply, Continuous PRN, Mario Lowe MD    phenylephrine (ROBIN-SYNEPHRINE) 50 mg in 250 mL NS infusion, 0.2-2 mcg/kg/min, Intravenous, Continuous PRN, Delia Stern APRN    polyethylene glycol (MIRALAX) packet 17 g, 17 g, Oral, Daily PRN, Delia Stern APRN    Potassium Replacement - Follow Nurse / BPA Driven Protocol, , Does not apply, PRN, Delia Stern APRN    propofol (DIPRIVAN) infusion 10 mg/mL 100 mL, 5-50 mcg/kg/min, Intravenous, Continuous PRN, Delia Stern APRN, Stopped at 11/03/23 0430    sennosides-docusate (PERICOLACE) 8.6-50 MG per tablet 2 tablet, 2 tablet, Oral, Nightly, Delia Stern APRN    sodium chloride 0.9 % infusion, 30 mL/hr, Intravenous, Continuous, Delia Stern APRN, Last Rate: 30 mL/hr at 11/02/23 1400, 30 mL/hr at 11/02/23 1400    Objective     Results Review:  LABS:  Results from last 7 days   Lab Units 11/03/23  0407 11/02/23  1736 11/02/23  1412   WBC 10*3/mm3 6.95 5.07 4.02   HEMOGLOBIN g/dL 9.3* 9.0* 8.2*   HEMATOCRIT % 28.5* 27.1* 25.8*   PLATELETS 10*3/mm3 172 186 162     Results from last 7 days   Lab Units 11/03/23  0407 11/02/23  2351 11/02/23  1736   SODIUM mmol/L 140 139 141   POTASSIUM mmol/L 4.5 4.4 4.1   CHLORIDE mmol/L 99 96* 98   CO2 mmol/L 24.4 25.0 28.0   BUN mg/dL 26* 25* 21*   CREATININE mg/dL 5.20* 5.17* 5.02*   CALCIUM mg/dL 8.8 9.0 9.0   BILIRUBIN mg/dL 0.2  --   --    ALK PHOS U/L 65  --   --    ALT (SGPT) U/L 12  --   --    AST (SGOT) U/L 55*  --   --    GLUCOSE mg/dL 126* 163* 132*     Results from last 7 days   Lab Units 11/03/23  0407   INR  1.20*         DIAGNOSTICS:  CT head reveals thin right posterior falx/tentorium cerebelli subdural hematoma with no mass effect or midline shift.  There is a thin right convexity subdural hygroma as well.    Results Review:   I reviewed the  patient's new clinical results.  I personally viewed and interpreted the patient's CT head    Vital Signs   Temp:  [97 °F (36.1 °C)-99.3 °F (37.4 °C)] 97.5 °F (36.4 °C)  Heart Rate:  [65-94] 65  Resp:  [14-15] 14  BP: ()/(74-96) 100/75  FiO2 (%):  [30 %-40 %] 30 %    Physical Exam:  Physical Exam  Vitals reviewed.   Constitutional:       Appearance: Normal appearance.      Comments: Does not seem to verbalize or follow commands.   Eyes:      Extraocular Movements: EOM normal.      Pupils: Pupils are equal, round, and reactive to light.   Pulmonary:      Comments: On ventilator.  Not overbreathing vent at present  Abdominal:      General: Abdomen is flat.   Skin:     General: Skin is warm and dry.      Comments: Multiple tattoos to me: Chest tube x2, central line right IJ       Neurologic Exam     Mental Status   Follows commands: None.   Attention: decreased. Concentration: decreased.   Speech: mute   Level of consciousness: responsive to painful stimuli  Abnormal comprehension.     Cranial Nerves     CN III, IV, VI   Pupils are equal, round, and reactive to light.  Extraocular motions are normal.   Pupils: pinpoint    CN IX, X   Right gag reflex: normal  Left gag reflex: normal    CN XII   Fasciculations: absent  Tongue deviation: none    Motor Exam   Muscle bulk: normal  Patient will withdrawal to pain x4 extremities very weakly upper extremities.     Sensory Exam     Intact to painful stimulus x4 extremities     Gait, Coordination, and Reflexes     Gait  Gait: (Not appropriate for testing due to critical status)    Reflexes   Right plantar: normal  Left plantar: normal  Right ankle clonus: absent  Left ankle clonus: absent  Patient unable to cooperate for further exam due to mental status       Assessment & Plan       Dissecting ascending aortic aneurysm    Vitamin D deficiency    Thrombocytopenia    Hypertension secondary to other renal disorders    Pancytopenia    Systemic lupus erythematosus     "Pericardial effusion    End stage renal disease on dialysis    Seizure disorder    Elevated liver function tests    Essential hypertension    Peritoneal dialysis catheter in place    Anemia due to chronic kidney disease, on chronic dialysis    Hyponatremia    Poor appetite    Moderate malnutrition    Chronic diastolic CHF (congestive heart failure)    Aortic valve lesion    Severe aortic valve regurgitation    Patient with multiple medical issues presented to hospital with shortness of breath and found to have aortic dissection which she had surgery for repair yesterday.  She had a seizure event overnight which ceased with Keppra and she was evaluated by neurology service who ordered CT head.  This revealed a thin acute right posterior parafalcine and tentorial subdural hematoma as well as a thin right subdural hygroma.  Fortunately patient is not on any major anticoagulation.  She did receive aspirin 81 mg this morning.  Her home Plaquenil is currently being held for her surgery.  She remains intubated and has muted neurologic exam.  She did open her eyes with repeated painful stimuli.  She withdrawals but will not follow commands.  Pupils are pinpoint but equal.  CT scan findings are minimal and should not be causing the neurologic compromise.  She could be postictal or sedated from the seizure medications given.  We will continue to follow and repeat her CT head tomorrow.  Hold all anticoagulants and antiplatelets for now.  Discussed with Dr. Medina.    PLAN:   Hold all AC/antiplatelets  Repeat CTH in AM    I discussed the patient's findings and my recommendations with  Dr. Medina    During patient visit, I utilized appropriate personal protective equipment including gloves. Appropriate PPE was worn during the entire visit.  Hand hygiene was completed before and after.     Ramya Barrera, APRN  11/03/23  11:34 EDT    \"Dictated utilizing Dragon dictation\".      "

## 2023-11-03 NOTE — OP NOTE
Operative Note    Date of Dictation: 11/03/23    Date of Procedure: 11/02/2023    Referring Physician: Summer Taylor MD    Preoperative diagnosis:  1.  Severe aortic regurgitation  2.  Dyspnea of exertion  3.  Heart failure class III  4.  Aortic root aneurysm  5.  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  End-stage renal disease on peritoneal dialysis  7.  Lupus erythematous systemic  8.  Thrombocytopenia    Postoperative diagnosis:  Same    Procedure:   1.  Aortic dissection repair and proximal aortic replacement using a 26 mm Gelweave dacryon interposition graft in a valve sparing procedure configuration (Anselmo procedure (CPT code 44366)  2.  Insertion of Shiley procedure percutaneously in the right common femoral vein  3.  Comprehensive neuro monitoring    Surgeon: Chris Medina MD     Assistants: Main Assistant: Mario Lowe MD, Assistants: Ernie Dan PA-C; Sondra Jordan, JENIFFER was responsible for performing the following activities: Retraction, Suction, Irrigation, Suturing, Closing, Placing Dressing, and all aspects of the complex cardiac case  and their skilled assistance was necessary for the success of this case.    Anesthesia: General endotracheal anesthesia and CHETNA    Findings:  Aortic root changes revealing subacute or chronic aortic dissection with a flap in the noncoronary sinus and effacement of the sinotubular junction with leaflet prolapse and severe aortic regurgitation.  The aortic dissection stopped at the level of the mid ascending aorta    Estimated Blood Loss: Approximately 600 cc were returned to the patient from the recovered blood in the Cell Saver and the rest of the blood recovery was performed with a cardiotomy suckers    STS Data:  The patient was explained the risks and benefits and alternatives of surgery and agreed to proceed. The STS risk calculator was used for the AVR-repair.  Counseling was given about diet, alcohol and tobacco use as needed.   Antibiotics and the beta-blockers were given within the STS required window        Description of the procedure:     The patient was placed supine on the operative table. Anesthesia was given and lines placed. The patient was prepped and draped using the usual sterile technique. A median sternotomy was performed with a scalpel and the layers carried down to the sternum using the electrocautery. The sternum was split in the midline using a vertical oscillating saw. Hemostasis was achieved.  A Favaloro retractor was placed and anterior mediastinal was exposed the pericardium was opened.  There was evidence of chronic pericarditis that was softer adhesions and I was able to separate the pericardium from the adjacent structures and tacked the edges to the wound . I dissected around the distal ascending aorta and expose the aortic arch.  In my opinion, circulatory arrest was not needed .300 units of IV heparin was given to maintain the ACT over 400.  Cannulation sutures were placed in the mid aortic arch and right atrium. Cardiopulmonary bypass was started and I placed cardioplegia cannulas in the mid ascending aorta and in the coronary sinus.  The distal ascending aorta was clamped flush to the innominate artery.  One liter of cold blood cardioplegia was given in an antegrade fashion to achieve diastolic arrest and further doses every 15 minutes thereafter also using retrograde infusion and coronary ostia cannulas.  The aorta was transected in the mid portion and the root was explored.  There were changes revealing a subacute versus chronic dissection with fibrosis and aortic layer separation.  The valve leaflets look symmetric in a trileaflet valve and in my opinion it was repairable.  The right to none coronary commissure was detached from the wall and attached thin cord.  I  the aortic root tissue from the annulus leaving a 5 to 8 mm rim of aortic tissue and I dissected from the outside to the level of the  aortic annulus.  I selected a 26 mm gel wave Dacron graft to used to reimplant the valve.  I used 2 oh 81 pledgeted sutures which I placed 1 under each commissure and then 2 under the annulus on each third of the valve.  I passed the sutures symmetrically through the Dacron graft and then intussuscepted the valve inside.  I tied all the sutures.  I used 4-0 Prolene pledgeted sutures to resuspend each commissure inside the graft symmetrically.  I performed the water test with good continence.  Following, I used 4-0 Prolene continuous sutures to suture the rim of aortic tissue around the annulus into the aortic graft.  I tested the valve again and there was no leakage or whatsoever.  I had an LV vent which was placed on high and despite that the column of water in the graft stayed without drain.  I trimmed further each coronary ostia patch which both were dissected and in some areas both layers need to be incorporated in the suture line.  I used the eye cautery and develop an open on each side of the graft corresponding to the each coronary patch.  I used 5-0 Prolene continuous sutures to complete each patch anastomosis.  I resected the rest of the ascending aorta to the level of the aortic clamp.  I measured the graft to the distal aortic cuff and completed the distal anastomosis using a 4-0 Prolene continuous suture.  The cannula and vent in the mid aortic arch, the patient was systemically rewarmed, gave the 1 dose of cardioplegia and then completion I remove the aortic clamp with steep suction on the graft vent. The left pleural space was suctioned and the lungs ventilated. The heart was paced till regular atrial rhythm resumed. I allowed the heart to eject  and I de-aired the left heart chambers under CHETNA guidance and once hemodynamics were acceptable, then the CPB was discontinued and the venous and cardioplegia cannulas removed. The matching dose of protamine was given and the aortic cannula removed as well.   CHETNA showed the aortic valve competent, without prolapse and with tiny trace leak in one of the commissures.  In my opinion, the repair was successful.  Platelets, FFP and cryoprecipitate were given to reverse the cardiopulmonary bypass related coagulopathy.  There was an area of bleeding in the left coronary patch that was difficult to correct therefore patient was recannulated and I resumed cardiopulmonary pass after heparinization.  I clamped the aorta once more and give a liter of cold blood cardioplegia.  I placed two 6-0 Prolene sutures in the area of the bleeding in the lateral aspect of the left coronary patch anastomosis.  That resolved the issue.  Of note, multiple topical agents such as CoSeal, Floseal and Surgicel were used for topical hemostasis.  Following, I weaned the patient from CPB, remove the venous cannula and then give the protamine completion remove the aortic cannula and secure the new pursestring.    AV temporary wires and pleural and mediastinal chest tubes were placed and the wound sprayed with platelet rich plasma.  The sternum was closed with single and double wires and soft tissue in layers of reabsorbable material. The wounds were covered with sterile dressings.  Selected a 24 cm Shiley and using Seldinger technique I accessed the right common femoral vein percutaneously and then through the wire to dilate the track and then place a Shiley without difficulty.  Heparinized saline was left in the 3 ports to prevent clotting of the line.  A sterile dressing was applied    Neuro monitoring only showed low brain saturations symmetrically bilaterally similar before and after the CPB run.  No local deficit or seizure activity was noticed    Specimen removed: Proximal thoracic aortic tissue    CPB time: 158 minutes    Aortic clamp time: 131 minutes       Complications:  none           Disposition: Cardiovascular recovery room           Condition: Critical but stable.    Chris Medina,  M.D.

## 2023-11-03 NOTE — CONSULTS
Neurology Consult Note    Consult Date: 11/3/2023    Referring MD: No ref. provider found    Reason for Consult I have been asked to see the patient in neurological consultation to render advice and opinion regarding seizure precautions    Dee Herrera is a 33 y.o. female past medical history of end-stage renal disease with peritoneal dialysis hypertension, chronic diastolic heart failure with pulmonary hypertension, malnutrition/poor appetite, thrombocytopenia, history of lupus on Plaquenil and CellCept at home, anemia of chronic diseases and also history of seizure disorder.    Patient was admitted on 10/26/2023 when she came in with shortness of breath worse with exertion was also found to have hyponatremia in the emergency room, etiology and cardiothoracic surgery evaluated the patient initially and and she underwent ascending aortic aneurysm dissection repair with proximal replacement yesterday.     Overnight she was having seizure-like activity, very poor description but it looks somewhat like generalized tonic-clonic seizure as per the nursing staff description, received Keppra last night and did not have any more seizure-like spells after that.    She has a history of seizure disorder, she was seen previously at [facility] by Dr. Connor Salinas and also at [place] [facility] by Dr. Perdue.  She was previously treated with Keppra and Vimpat however, but her EEGs have been negative in the past and later on antiepileptics have been discontinued on the thought that it was likely provoked from her electrolyte deficiencies/posterior reversible encephalopathy from her uncontrolled hypertension.  She had 2 EEGs on record both of them have been negative and did not show any interictal abnormalities.        Past Medical History:   Diagnosis Date    Anasarca     PER CT SCAN    Dry skin     ESRD (end stage renal disease) on dialysis     TUES, THURS, SAT [address]  "   History of abdominal pain     History of anemia     History of transfusion     Hypertension     Iron deficiency anemia 09/27/2021    Lupus (systemic lupus erythematosus) 07/30/2022    Migraine     Other specified nutritional anemias     Pericardial effusion     Renal insufficiency     Seizures     STATES LAST WAS 1/2023    Shortness of breath     OCCASIONAL    Vitamin D deficiency 09/27/2021       Exam  /75   Pulse 65   Temp 97.5 °F (36.4 °C)   Resp 14   Ht 165 cm (64.96\")   Wt 54.1 kg (119 lb 4.3 oz)   LMP 08/10/2022 (Approximate)   SpO2 99%   BMI 19.87 kg/m²     Gen: NAD, vitals reviewed  MS: Does not open eyes to voice, does not follow commands  Grimaces to pain  CN: no blink to threat b/l, pupils pinpoint and sluggishly reactive, corneal reflex present, cough reflex absent  Motor: Withdraws to pain all 4 extremities, pronounced in bilateral lower extremities      DATA:    Lab Results   Component Value Date    GLUCOSE 126 (H) 11/03/2023    CALCIUM 8.8 11/03/2023     11/03/2023    K 4.5 11/03/2023    CO2 24.4 11/03/2023    CL 99 11/03/2023    BUN 26 (H) 11/03/2023    CREATININE 5.20 (H) 11/03/2023    EGFRIFAFRI 107 06/25/2021    BCR 5.0 (L) 11/03/2023    ANIONGAP 16.6 (H) 11/03/2023     Lab Results   Component Value Date    WBC 6.95 11/03/2023    HGB 9.3 (L) 11/03/2023    HCT 28.5 (L) 11/03/2023    MCV 86.4 11/03/2023     11/03/2023       Lab review:   Sodium 140  Creatinine 5.20  Blood glucose 126   AST 55/ALT 12    WBC 6.95  Hemoglobin 9.3 and platelets 172    Imaging review:   CT head done on 2/13/2023-no acute abnormality, good hypodensity or hyperdensity, mild cortical atrophy and small vessel disease    Diagnoses:  Seizure-like activity    Dee Herrera is a 33 y.o. female past medical history of end-stage renal disease with peritoneal dialysis hypertension, chronic diastolic heart failure with pulmonary hypertension, malnutrition/poor appetite, thrombocytopenia, history of " lupus on Plaquenil and CellCept at home, anemia of chronic diseases and also history of seizure disorder.    Patient got admitted for shortness of breath and was operated for ascending aortic dissection aneurysm repair, stop she developed some seizure-like activity and neurology was consulted for the same.    Patient was given Keppra 1 g load yesterday when she started having seizure-like spells, later on is on Keppra 500 mg twice daily along with 250 additional dose after dialysis.    Plan  Get a routine CT head  Place her on continuous video monitoring EEG    Addendum CT head did show new right along the medial aspect of the tentorium, very tiny but will ask neurosurgery to comment on it.    Continue to monitor off sedation unless EEG shows any epileptiform discharges or subclinical seizures    We will hold off on any anticoagulation until neurosurgery clears up.    We will repeat CT head tomorrow morning.      MDM   Reviewed: Previous charts, nursing notes and vitals   Reviewed: Previous labs and CT scan    Interpretation: Labs and CT scan   Total time providing critical care is :30-74 minutes. This excluded time spent performing separately reportable procedures and services  Consults :Neurology/Stroke    [facility] speech recognition transcription software was used to create portions of this document. An attempt at proofreading has been made to minimize errors, which may be grammatical errors, nonsensical statements, inaccuracies and/or not pertinent to the context. Please call if you have any questions.     Preet Guillen MD  Neuro Hospitalist /Vascular Neurology.

## 2023-11-03 NOTE — PROGRESS NOTES
"[facility] Clinical [facility]acy Services: KEppra Consult     Dee Herrera has a pharmacy consult to dose Keppra per Dr. Lowe's request.     Indication: Seisures     Relevant clinical data and objective history reviewed:  33 y.o. female 165 cm (64.96\") 44.4 kg (97 lb 14.2 oz)  Estimated Creatinine Clearance: 11.1 mL/min (A) (by C-G formula based on SCr of 5.17 mg/dL (H)).    Past Medical History:   Diagnosis Date    Anasarca     PER CT SCAN    Dry skin     ESRD (end stage renal disease) on dialysis     TUES, THZACK, SAT [address]    History of abdominal pain     History of anemia     History of transfusion     Hypertension     Iron deficiency anemia 09/27/2021    Lupus (systemic lupus erythematosus) 07/30/2022    Migraine     Other specified nutritional anemias     Pericardial effusion     Renal insufficiency     Seizures     STATES LAST WAS 1/2023    Shortness of breath     OCCASIONAL    Vitamin D deficiency 09/27/2021     is allergic to minoxidil.  Assessment/Plan    Patient has hx of been admitted due to breakthrough Seizures at least 3 times in the past per notes from different facilities. Has received Keppra 1g IV  LD + Vimpat.  Unable to determine previous maintenance doses from  her records.   Will repeat loading dose used in the past of 1g IV of Keppra,  followed by 500 mg IV daily dose for renal function (PD dialysis  ).Pharmacy will continue to follow.     Bucky [facility]  Clinical Pharmacist   "

## 2023-11-03 NOTE — PROGRESS NOTES
[facility] Follow Up    Chief Complaint: Follow up aortic aneurysm with dissection, aortic valve regurgitation     Interval History: Events overnight noted.  Had seizure activity off sedation last night.  Placed back on sedation along with Ativan and Keppra.  This morning off of all sedation starting at 4 AM.  So far no further seizure activity but also remains unresponsive.    Objective:     Objective:  Temp:  [97 °F (36.1 °C)-99.3 °F (37.4 °C)] 97.5 °F (36.4 °C)  Heart Rate:  [65-94] 65  Resp:  [14-15] 14  BP: ()/(74-96) 100/75  FiO2 (%):  [40 %] 40 %     Intake/Output Summary (Last 24 hours) at 11/3/2023 0738  Last data filed at 11/3/2023 0600  Gross per 24 hour   Intake 6799.28 ml   Output 1730 ml   Net 5069.28 ml     Body mass index is 19.87 kg/m².      11/01/23  1739 11/02/23  0424 11/03/23  0409   Weight: 52.2 kg (115 lb 1.3 oz) 44.4 kg (97 lb 14.2 oz) 54.1 kg (119 lb 4.3 oz)     Weight change: 1.9 kg (4 lb 3 oz)      Physical Exam:   General : Intubated  Neuro: Unresponsive off sedation  Lungs: CTAB. Normal respiratory effort and rate.  CV: Regular rate and rhythm, normal S1 and S2, no murmurs, gallops or rubs.  ABD: Soft, nontender, nondistended. Positive bowel sounds.  Extr: No edema or cyanosis, moves all extremities.    Lab Review:   Results from last 7 days   Lab Units 11/03/23  0407 11/02/23  2351   SODIUM mmol/L 140 139   POTASSIUM mmol/L 4.5 4.4   CHLORIDE mmol/L 99 96*   CO2 mmol/L 24.4 25.0   BUN mg/dL 26* 25*   CREATININE mg/dL 5.20* 5.17*   GLUCOSE mg/dL 126* 163*   CALCIUM mg/dL 8.8 9.0   AST (SGOT) U/L 55*  --    ALT (SGPT) U/L 12  --          Results from last 7 days   Lab Units 11/03/23  0407 11/02/23  1736   WBC 10*3/mm3 6.95 5.07   HEMOGLOBIN g/dL 9.3* 9.0*   HEMATOCRIT % 28.5* 27.1*   PLATELETS 10*3/mm3 172 186     Results from last 7 days   Lab Units 11/03/23  0407 11/02/23  1412 11/02/23  1313 11/02/23  1251   INR  1.20* 1.62*   < > 1.70*   APTT seconds  --   "33.7  --  33.6    < > = values in this interval not displayed.     Results from last 7 days   Lab Units 11/03/23  0407 11/02/23  1736   MAGNESIUM mg/dL 2.6 2.4           Invalid input(s): \"LDLCALC\"  Results from last 7 days   Lab Units 10/30/23  1511   PROBNP pg/mL >70,000.0*         I reviewed the patient's new clinical results.  I personally viewed and interpreted the patient's EKG  Current Medications:   Scheduled Meds:acetaminophen, 1,000 mg, Intravenous, Q8H  acetaminophen, 650 mg, Oral, Q4H   Or  acetaminophen, 650 mg, Oral, Q4H   Or  acetaminophen, 650 mg, Rectal, Q4H  aspirin, 81 mg, Oral, Daily  atorvastatin, 40 mg, Oral, Nightly  calcium gluconate, 2,000 mg, Intravenous, Once  ceFAZolin, 2,000 mg, Intravenous, Q24H  chlorhexidine, 15 mL, Mouth/Throat, Q12H  heparin (porcine), 5,000 Units, Subcutaneous, Q8H  levETIRAcetam, 500 mg, Intravenous, Q24H  metoclopramide, 5 mg, Intravenous, Q6H  metoprolol tartrate, 12.5 mg, Oral, Q12H  mupirocin, , Each Nare, BID  pantoprazole, 40 mg, Oral, QAM  senna-docusate sodium, 2 tablet, Oral, Nightly      Continuous Infusions:clevidipine, 2-32 mg/hr, Last Rate: 18 mg/hr (11/03/23 0731)  dexmedetomidine, 0.2-1.5 mcg/kg/hr, Last Rate: Stopped (11/02/23 2000)  DOPamine, 2-20 mcg/kg/min  EPINEPHrine, 0.02-0.1 mcg/kg/min  insulin, 0-100 Units/hr  milrinone, 0.25-0.375 mcg/kg/min  niCARdipine, 5-15 mg/hr, Last Rate: Stopped (11/02/23 1445)  nitroglycerin, 5-200 mcg/min  norepinephrine, 0.02-0.2 mcg/kg/min  Pharmacy Consult - Pharmacy to dose,   phenylephrine, 0.2-2 mcg/kg/min  propofol, 5-50 mcg/kg/min, Last Rate: Stopped (11/03/23 0430)  sodium chloride, 30 mL/hr, Last Rate: 30 mL/hr (11/02/23 1400)        Allergies:  Allergies   Allergen Reactions    Minoxidil Other (See Comments) and Hives     Pericardial effusion .       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  Status post repair and proximal aortic replacement on 11/2 "   Severe aortic valve regurgitation.  Status post repair.    Seizures.  Occurred postoperatively.  No recurrent seizures off of sedation this morning but also unresponsive at this time.  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  On peritoneal dialysis normally.  Plan for hemodialysis postoperatively.  Hyponatremia.  Resolved.  Hypertension.  Initially hypertensive following surgery.  On clevidipine.  Chronic anemia. Stable postoperatively.  Systemic lupus erythematosus.  Quinnell and mycophenolate on hold for surgery.  Thrombocytopenia.  Improved and stable following platelet transfusion prior to surgery.    -Continue supportive care.  - Await neurology input.  -Plan for hemodialysis today per nephrology noted.      Juana Taylor MD  11/03/23  07:38 EDT

## 2023-11-03 NOTE — NURSING NOTE
Patient remains on clevidipine to keep Systolic< 120. Sedation off for approximately 4 hours and seizure like activity occurred. MD notified. Propofol restarted and loading dose of Keppra given. Patient had 12 total seizure like activities from 0001 to 0220. Propofol off since 0405 per MD and neurology consult called per orders. Will continue to monitor.

## 2023-11-03 NOTE — PROGRESS NOTES
" LOS: 8 days   Patient Care Team:  Margarita Woods APRN as PCP - General (Nurse Practitioner)  Winnie Sanchez MD as Referring Physician (Obstetrics and Gynecology)  Norberto Almaraz MD PhD as Consulting Physician (Hematology and Oncology)  Lupis Thomas MD as Consulting Physician (Nephrology)  Hair Mckeon MD as Consulting Physician (Nephrology)  Juana Taylor MD as Consulting Physician (Cardiology)    Chief Complaint: post op     Subjective  Intubated     Vital Signs  Temp:  [97 °F (36.1 °C)-99.3 °F (37.4 °C)] 97.5 °F (36.4 °C)  Heart Rate:  [65-94] 65  Resp:  [14-15] 14  BP: ()/(74-96) 100/75  FiO2 (%):  [40 %] 40 %  Body mass index is 19.87 kg/m².    Intake/Output Summary (Last 24 hours) at 11/3/2023 0727  Last data filed at 11/3/2023 0600  Gross per 24 hour   Intake 6799.28 ml   Output 1730 ml   Net 5069.28 ml     No intake/output data recorded.    Chest tube drainage last 8 hours 50/50        11/01/23  1739 11/02/23  0424 11/03/23  0409   Weight: 52.2 kg (115 lb 1.3 oz) 44.4 kg (97 lb 14.2 oz) 54.1 kg (119 lb 4.3 oz)         Objective:  Vital signs: (most recent): Blood pressure 100/75, pulse 65, temperature 97.5 °F (36.4 °C), resp. rate 14, height 165 cm (64.96\"), weight 54.1 kg (119 lb 4.3 oz), last menstrual period 08/10/2022, SpO2 99%, not currently breastfeeding.                Results Review:        WBC WBC   Date Value Ref Range Status   11/03/2023 6.95 3.40 - 10.80 10*3/mm3 Final   11/02/2023 5.07 3.40 - 10.80 10*3/mm3 Final   11/02/2023 4.02 3.40 - 10.80 10*3/mm3 Final   11/02/2023 2.86 (L) 3.40 - 10.80 10*3/mm3 Final   11/02/2023 2.87 (L) 3.40 - 10.80 10*3/mm3 Final   11/01/2023 2.05 (L) 3.40 - 10.80 10*3/mm3 Final      HGB Hemoglobin   Date Value Ref Range Status   11/03/2023 9.3 (L) 12.0 - 15.9 g/dL Final   11/02/2023 9.0 (L) 12.0 - 15.9 g/dL Final   11/02/2023 8.2 (L) 12.0 - 15.9 g/dL Final   11/02/2023 8.8 (L) 12.0 - 17.0 g/dL Final   11/02/2023 5.7 (C) 12.0 - 15.9 " g/dL Final   11/02/2023 7.8 (L) 12.0 - 15.9 g/dL Final   11/01/2023 12.9 12.0 - 15.9 g/dL Final      HCT Hematocrit   Date Value Ref Range Status   11/03/2023 28.5 (L) 34.0 - 46.6 % Final   11/02/2023 27.1 (L) 34.0 - 46.6 % Final   11/02/2023 25.8 (L) 34.0 - 46.6 % Final   11/02/2023 26 (L) 38 - 51 % Final   11/02/2023 18.1 (C) 34.0 - 46.6 % Final   11/02/2023 24.7 (L) 34.0 - 46.6 % Final   11/01/2023 40.1 34.0 - 46.6 % Final      Platelets Platelets   Date Value Ref Range Status   11/03/2023 172 140 - 450 10*3/mm3 Final   11/02/2023 186 140 - 450 10*3/mm3 Final   11/02/2023 162 140 - 450 10*3/mm3 Final   11/02/2023 117 (L) 140 - 450 10*3/mm3 Final   11/02/2023 38 (C) 140 - 450 10*3/mm3 Final   11/01/2023 85 (L) 140 - 450 10*3/mm3 Final        PT/INR:    Protime   Date Value Ref Range Status   11/03/2023 15.4 (H) 11.7 - 14.2 Seconds Final   11/02/2023 19.6 (H) 11.7 - 14.2 Seconds Final   11/02/2023 16.7 (H) 12.8 - 15.2 seconds Final     Comment:     Serial Number: [id number]:  [id number]   11/02/2023 20.2 (H) 11.7 - 14.2 Seconds Final   11/02/2023 25.0 (H) 11.7 - 14.2 Seconds Final   /  INR   Date Value Ref Range Status   11/03/2023 1.20 (H) 0.90 - 1.10 Final   11/02/2023 1.62 (H) 0.90 - 1.10 Final   11/02/2023 1.4 (H) 0.8 - 1.2 Final   11/02/2023 1.70 (H) 0.90 - 1.10 Final   11/02/2023 2.21 (H) 0.90 - 1.10 Final       Sodium Sodium   Date Value Ref Range Status   11/03/2023 140 136 - 145 mmol/L Final   11/02/2023 139 136 - 145 mmol/L Final   11/02/2023 141 136 - 145 mmol/L Final   11/02/2023 143 136 - 145 mmol/L Final   11/01/2023 127 (L) 136 - 145 mmol/L Final      Potassium Potassium   Date Value Ref Range Status   11/03/2023 4.5 3.5 - 5.2 mmol/L Final     Comment:     Slight hemolysis detected by analyzer. Results may be affected.   11/02/2023 4.4 3.5 - 5.2 mmol/L Final     Comment:     Slight hemolysis detected by analyzer. Results may be affected.   11/02/2023 4.1 3.5 - 5.2 mmol/L Final   11/02/2023 4.0 3.5 -  5.2 mmol/L Final   11/01/2023 3.7 3.5 - 5.2 mmol/L Final      Chloride Chloride   Date Value Ref Range Status   11/03/2023 99 98 - 107 mmol/L Final   11/02/2023 96 (L) 98 - 107 mmol/L Final   11/02/2023 98 98 - 107 mmol/L Final   11/02/2023 100 98 - 107 mmol/L Final   11/01/2023 89 (L) 98 - 107 mmol/L Final      Bicarbonate CO2   Date Value Ref Range Status   11/03/2023 24.4 22.0 - 29.0 mmol/L Final   11/02/2023 25.0 22.0 - 29.0 mmol/L Final   11/02/2023 28.0 22.0 - 29.0 mmol/L Final   11/02/2023 25.4 22.0 - 29.0 mmol/L Final   11/01/2023 24.0 22.0 - 29.0 mmol/L Final      BUN BUN   Date Value Ref Range Status   11/03/2023 26 (H) 6 - 20 mg/dL Final   11/02/2023 25 (H) 6 - 20 mg/dL Final   11/02/2023 21 (H) 6 - 20 mg/dL Final   11/02/2023 18 6 - 20 mg/dL Final   11/01/2023 34 (H) 6 - 20 mg/dL Final      Creatinine Creatinine   Date Value Ref Range Status   11/03/2023 5.20 (H) 0.57 - 1.00 mg/dL Final   11/02/2023 5.17 (H) 0.57 - 1.00 mg/dL Final   11/02/2023 5.02 (H) 0.57 - 1.00 mg/dL Final   11/02/2023 4.67 (H) 0.57 - 1.00 mg/dL Final   11/01/2023 6.45 (H) 0.57 - 1.00 mg/dL Final      Calcium Calcium   Date Value Ref Range Status   11/03/2023 8.8 8.6 - 10.5 mg/dL Final   11/02/2023 9.0 8.6 - 10.5 mg/dL Final   11/02/2023 9.0 8.6 - 10.5 mg/dL Final   11/02/2023 8.2 (L) 8.6 - 10.5 mg/dL Final   11/01/2023 8.9 8.6 - 10.5 mg/dL Final      Magnesium Magnesium   Date Value Ref Range Status   11/03/2023 2.6 1.6 - 2.6 mg/dL Final   11/02/2023 2.4 1.6 - 2.6 mg/dL Final   11/02/2023 2.5 1.6 - 2.6 mg/dL Final   11/01/2023 1.6 1.6 - 2.6 mg/dL Final          acetaminophen, 1,000 mg, Intravenous, Q8H  acetaminophen, 650 mg, Oral, Q4H   Or  acetaminophen, 650 mg, Oral, Q4H   Or  acetaminophen, 650 mg, Rectal, Q4H  aspirin, 81 mg, Oral, Daily  atorvastatin, 40 mg, Oral, Nightly  ceFAZolin, 2,000 mg, Intravenous, Q24H  chlorhexidine, 15 mL, Mouth/Throat, Q12H  heparin (porcine), 5,000 Units, Subcutaneous, Q8H  levETIRAcetam, 500 mg,  Intravenous, Q24H  metoclopramide, 5 mg, Intravenous, Q6H  metoprolol tartrate, 12.5 mg, Oral, Q12H  mupirocin, , Each Nare, BID  pantoprazole, 40 mg, Oral, QAM  senna-docusate sodium, 2 tablet, Oral, Nightly      clevidipine, 2-32 mg/hr, Last Rate: 18 mg/hr (11/03/23 0541)  dexmedetomidine, 0.2-1.5 mcg/kg/hr, Last Rate: Stopped (11/02/23 2000)  DOPamine, 2-20 mcg/kg/min  EPINEPHrine, 0.02-0.1 mcg/kg/min  insulin, 0-100 Units/hr  milrinone, 0.25-0.375 mcg/kg/min  niCARdipine, 5-15 mg/hr, Last Rate: Stopped (11/02/23 1445)  nitroglycerin, 5-200 mcg/min  norepinephrine, 0.02-0.2 mcg/kg/min  Pharmacy Consult - Pharmacy to dose,   phenylephrine, 0.2-2 mcg/kg/min  propofol, 5-50 mcg/kg/min, Last Rate: Stopped (11/03/23 0430)  sodium chloride, 30 mL/hr, Last Rate: 30 mL/hr (11/02/23 1400)              Dissecting ascending aortic aneurysm    Vitamin D deficiency    Thrombocytopenia    Hypertension secondary to other renal disorders    Pancytopenia    Systemic lupus erythematosus    Pericardial effusion    End stage renal disease on dialysis    Seizure disorder    Elevated liver function tests    Essential hypertension    Peritoneal dialysis catheter in place    Anemia due to chronic kidney disease, on chronic dialysis    Hyponatremia    Poor appetite    Moderate malnutrition    Chronic diastolic CHF (congestive heart failure)    Aortic valve lesion    Severe aortic valve regurgitation      Assessment & Plan    - ascending aortic aneurysm with dissection- s/p ascending aortic dissection repair/proximal replacement, gacron interposition graft, kelli procedure; insertion of right femoral shiley- POD#1 Pagni  - severe aortic valve insufficiency  - ESRD on PD  - Lupus--on Cellcept/plaquenil; currently held  - hx of seizures  - chronic immunosuppression  - hypertension  - chronic anemia  - TCP--chronic     Remains intubated-- sedation has been off since 4am  Multiple seizures overnight after sedation weaned-- ativan given-  keppra started-- neurology consulted-- I discussed overnight events with her mother.  Will awaiting neurology's recommendations   Plans for hemodialysis today  Sinus rhythm rate 60s-- on cleviprex   Continue supportive care       Delia Streeter, APRN  11/03/23  07:27 EDT

## 2023-11-03 NOTE — PROGRESS NOTES
[facility]: Keppra Consult    Dee Herrera has a pharmacy consult to dose Keppra per Dr. Lowe's request.     Indication:  Seizures    Relevant clinical data and objective history reviewed:    Past Medical History:   Diagnosis Date    Anasarca     PER CT SCAN    Dry skin     ESRD (end stage renal disease) on dialysis     TUMARIA ISABEL, THZACK, [address]    History of abdominal pain     History of anemia     History of transfusion     Hypertension     Iron deficiency anemia 09/27/2021    Lupus (systemic lupus erythematosus) 07/30/2022    Migraine     Other specified nutritional anemias     Pericardial effusion     Renal insufficiency     Seizures     STATES LAST WAS 1/2023    Shortness of breath     OCCASIONAL    Vitamin D deficiency 09/27/2021     Assessment/Plan    Current dose: Keppra 500mg q24h.   Received LD Keppra 1g IV 11/3 at 0126.     Since patient is transitioning to HD will order a supplemental dose of Keppra 250mg x1 to be given after 11/3 HD session. (medication is 50% dialyzable)     Thank you for this consult and please contact pharmacy with any questions or concerns.     ADDENDUM: Unable to meet UF goal. Only 500ml net uf removed. Will not give supplemental dose today.      Elaina Correa, PharmD  Clinical Pharmacist

## 2023-11-04 ENCOUNTER — APPOINTMENT (OUTPATIENT)
Dept: GENERAL RADIOLOGY | Facility: HOSPITAL | Age: 33
DRG: 219 | End: 2023-11-04
Payer: MEDICARE

## 2023-11-04 ENCOUNTER — APPOINTMENT (OUTPATIENT)
Dept: CT IMAGING | Facility: HOSPITAL | Age: 33
DRG: 219 | End: 2023-11-04
Payer: MEDICARE

## 2023-11-04 LAB
ALBUMIN SERPL-MCNC: 3.1 G/DL (ref 3.5–5.2)
ANION GAP SERPL CALCULATED.3IONS-SCNC: 14.6 MMOL/L (ref 5–15)
ARTERIAL PATENCY WRIST A: ABNORMAL
ATMOSPHERIC PRESS: 752.1 MMHG
ATMOSPHERIC PRESS: 753 MMHG
BASE EXCESS BLDA CALC-SCNC: -1.7 MMOL/L (ref 0–2)
BASE EXCESS BLDV CALC-SCNC: -2.4 MMOL/L (ref -2–2)
BASOPHILS # BLD AUTO: 0.02 10*3/MM3 (ref 0–0.2)
BASOPHILS NFR BLD AUTO: 0.2 % (ref 0–1.5)
BDY SITE: ABNORMAL
BDY SITE: ABNORMAL
BUN SERPL-MCNC: 17 MG/DL (ref 6–20)
BUN/CREAT SERPL: 3.9 (ref 7–25)
CALCIUM SPEC-SCNC: 9.5 MG/DL (ref 8.6–10.5)
CHLORIDE SERPL-SCNC: 101 MMOL/L (ref 98–107)
CO2 BLDA-SCNC: 23.6 MMOL/L (ref 23–27)
CO2 BLDA-SCNC: 23.7 MMOL/L (ref 23–27)
CO2 SERPL-SCNC: 21.4 MMOL/L (ref 22–29)
CREAT SERPL-MCNC: 4.35 MG/DL (ref 0.57–1)
DEPRECATED RDW RBC AUTO: 52.7 FL (ref 37–54)
DEVICE COMMENT: ABNORMAL
EGFRCR SERPLBLD CKD-EPI 2021: 13.3 ML/MIN/1.73
EOSINOPHIL # BLD AUTO: 0 10*3/MM3 (ref 0–0.4)
EOSINOPHIL NFR BLD AUTO: 0 % (ref 0.3–6.2)
ERYTHROCYTE [DISTWIDTH] IN BLOOD BY AUTOMATED COUNT: 16.9 % (ref 12.3–15.4)
GLUCOSE BLDC GLUCOMTR-MCNC: 95 MG/DL (ref 70–130)
GLUCOSE BLDC GLUCOMTR-MCNC: 96 MG/DL (ref 70–130)
GLUCOSE SERPL-MCNC: 108 MG/DL (ref 65–99)
HCO3 BLDA-SCNC: 22.6 MMOL/L (ref 22–28)
HCO3 BLDV-SCNC: 22.4 MMOL/L (ref 22–28)
HCT VFR BLD AUTO: 27 % (ref 34–46.6)
HEMODILUTION: NO
HGB BLD-MCNC: 8.7 G/DL (ref 12–15.9)
INHALED O2 CONCENTRATION: 30 %
INHALED O2 CONCENTRATION: 30 %
LYMPHOCYTES # BLD AUTO: 0.39 10*3/MM3 (ref 0.7–3.1)
LYMPHOCYTES NFR BLD AUTO: 3.9 % (ref 19.6–45.3)
MAGNESIUM SERPL-MCNC: 2.1 MG/DL (ref 1.6–2.6)
MCH RBC QN AUTO: 27.8 PG (ref 26.6–33)
MCHC RBC AUTO-ENTMCNC: 32.2 G/DL (ref 31.5–35.7)
MCV RBC AUTO: 86.3 FL (ref 79–97)
MEAN AIRWAY PRESSURE: 10
MEAN AIRWAY PRESSURE: 9
MODALITY: ABNORMAL
MODALITY: ABNORMAL
MONOCYTES # BLD AUTO: 0.7 10*3/MM3 (ref 0.1–0.9)
MONOCYTES NFR BLD AUTO: 6.9 % (ref 5–12)
NEUTROPHILS NFR BLD AUTO: 8.93 10*3/MM3 (ref 1.7–7)
NEUTROPHILS NFR BLD AUTO: 88.3 % (ref 42.7–76)
O2 A-A PPRESDIFF RESPIRATORY: 0.8 MMHG
PAW @ PEAK INSP FLOW SETTING VENT: 25 CMH2O
PAW @ PEAK INSP FLOW SETTING VENT: 29 CMH2O
PCO2 BLDA: 35.6 MM HG (ref 35–45)
PCO2 BLDV: 37.9 MM HG (ref 41–51)
PEEP RESPIRATORY: 5 CM[H2O]
PEEP RESPIRATORY: 5 CM[H2O]
PH BLDA: 7.41 PH UNITS (ref 7.35–7.45)
PH BLDV: 7.38 PH UNITS (ref 7.31–7.41)
PHOSPHATE SERPL-MCNC: 4 MG/DL (ref 2.5–4.5)
PLATELET # BLD AUTO: 125 10*3/MM3 (ref 140–450)
PMV BLD AUTO: 12.2 FL (ref 6–12)
PO2 BLDA: 147.2 MM HG (ref 80–100)
PO2 BLDV: 53.3 MM HG (ref 35–45)
POTASSIUM SERPL-SCNC: 4.2 MMOL/L (ref 3.5–5.2)
QT INTERVAL: 381 MS
QTC INTERVAL: 448 MS
RBC # BLD AUTO: 3.13 10*6/MM3 (ref 3.77–5.28)
SAO2 % BLDCOA: 99.3 % (ref 92–98.5)
SAO2 % BLDCOV: 86.7 % (ref 45–75)
SET MECH RESP RATE: 10
SET MECH RESP RATE: 10
SODIUM SERPL-SCNC: 137 MMOL/L (ref 136–145)
TOTAL RATE: 17 BREATHS/MINUTE
TOTAL RATE: 21 BREATHS/MINUTE
VENTILATOR MODE: ABNORMAL
VENTILATOR MODE: ABNORMAL
VT ON VENT VENT: 400 ML
VT ON VENT VENT: 400 ML
WBC NRBC COR # BLD: 10.11 10*3/MM3 (ref 3.4–10.8)

## 2023-11-04 PROCEDURE — 25010000002 HYDRALAZINE PER 20 MG: Performed by: THORACIC SURGERY (CARDIOTHORACIC VASCULAR SURGERY)

## 2023-11-04 PROCEDURE — 99232 SBSQ HOSP IP/OBS MODERATE 35: CPT | Performed by: INTERNAL MEDICINE

## 2023-11-04 PROCEDURE — 25010000002 CEFAZOLIN IN DEXTROSE 2-4 GM/100ML-% SOLUTION: Performed by: NURSE PRACTITIONER

## 2023-11-04 PROCEDURE — 25010000002 HYDRALAZINE PER 20 MG: Performed by: NURSE PRACTITIONER

## 2023-11-04 PROCEDURE — 85025 COMPLETE CBC W/AUTO DIFF WBC: CPT | Performed by: INTERNAL MEDICINE

## 2023-11-04 PROCEDURE — 71045 X-RAY EXAM CHEST 1 VIEW: CPT

## 2023-11-04 PROCEDURE — 94799 UNLISTED PULMONARY SVC/PX: CPT

## 2023-11-04 PROCEDURE — 99232 SBSQ HOSP IP/OBS MODERATE 35: CPT | Performed by: STUDENT IN AN ORGANIZED HEALTH CARE EDUCATION/TRAINING PROGRAM

## 2023-11-04 PROCEDURE — 99232 SBSQ HOSP IP/OBS MODERATE 35: CPT | Performed by: NEUROLOGICAL SURGERY

## 2023-11-04 PROCEDURE — 25010000002 CLEVIDIPINE BUTYRATE PER 1 MG: Performed by: NURSE PRACTITIONER

## 2023-11-04 PROCEDURE — 82948 REAGENT STRIP/BLOOD GLUCOSE: CPT

## 2023-11-04 PROCEDURE — 83735 ASSAY OF MAGNESIUM: CPT | Performed by: INTERNAL MEDICINE

## 2023-11-04 PROCEDURE — 94761 N-INVAS EAR/PLS OXIMETRY MLT: CPT

## 2023-11-04 PROCEDURE — 93005 ELECTROCARDIOGRAM TRACING: CPT | Performed by: NURSE PRACTITIONER

## 2023-11-04 PROCEDURE — 93010 ELECTROCARDIOGRAM REPORT: CPT | Performed by: INTERNAL MEDICINE

## 2023-11-04 PROCEDURE — 80069 RENAL FUNCTION PANEL: CPT | Performed by: INTERNAL MEDICINE

## 2023-11-04 PROCEDURE — 25010000002 LEVETRIRACETAM PER 10 MG

## 2023-11-04 PROCEDURE — 99024 POSTOP FOLLOW-UP VISIT: CPT | Performed by: THORACIC SURGERY (CARDIOTHORACIC VASCULAR SURGERY)

## 2023-11-04 PROCEDURE — 82803 BLOOD GASES ANY COMBINATION: CPT

## 2023-11-04 PROCEDURE — 70450 CT HEAD/BRAIN W/O DYE: CPT

## 2023-11-04 PROCEDURE — C9248 INJ, CLEVIDIPINE BUTYRATE: HCPCS | Performed by: NURSE PRACTITIONER

## 2023-11-04 PROCEDURE — 94003 VENT MGMT INPAT SUBQ DAY: CPT

## 2023-11-04 PROCEDURE — 25010000002 ONDANSETRON PER 1 MG: Performed by: NURSE PRACTITIONER

## 2023-11-04 RX ORDER — CARVEDILOL 6.25 MG/1
6.25 TABLET ORAL EVERY 12 HOURS
Status: DISCONTINUED | OUTPATIENT
Start: 2023-11-04 | End: 2023-11-06

## 2023-11-04 RX ORDER — HYDRALAZINE HYDROCHLORIDE 20 MG/ML
20 INJECTION INTRAMUSCULAR; INTRAVENOUS EVERY 4 HOURS PRN
Status: DISCONTINUED | OUTPATIENT
Start: 2023-11-04 | End: 2023-11-25

## 2023-11-04 RX ADMIN — CLEVIPIDINE 8 MG/HR: 0.5 EMULSION INTRAVENOUS at 19:18

## 2023-11-04 RX ADMIN — CLEVIPIDINE 2 MG/HR: 0.5 EMULSION INTRAVENOUS at 10:26

## 2023-11-04 RX ADMIN — MUPIROCIN 1 APPLICATION: 20 OINTMENT TOPICAL at 08:31

## 2023-11-04 RX ADMIN — CLEVIPIDINE 10 MG/HR: 0.5 EMULSION INTRAVENOUS at 23:39

## 2023-11-04 RX ADMIN — HYDRALAZINE HYDROCHLORIDE 20 MG: 20 INJECTION INTRAMUSCULAR; INTRAVENOUS at 12:30

## 2023-11-04 RX ADMIN — CARVEDILOL 6.25 MG: 6.25 TABLET, FILM COATED ORAL at 21:09

## 2023-11-04 RX ADMIN — 0.12% CHLORHEXIDINE GLUCONATE 15 ML: 1.2 RINSE ORAL at 08:27

## 2023-11-04 RX ADMIN — MUPIROCIN 1 APPLICATION: 20 OINTMENT TOPICAL at 21:12

## 2023-11-04 RX ADMIN — ACETAMINOPHEN 650 MG: 160 SOLUTION ORAL at 03:50

## 2023-11-04 RX ADMIN — ACETAMINOPHEN 650 MG: 160 SOLUTION ORAL at 00:26

## 2023-11-04 RX ADMIN — LEVETIRACETAM 500 MG: 100 INJECTION INTRAVENOUS at 08:31

## 2023-11-04 RX ADMIN — 0.12% CHLORHEXIDINE GLUCONATE 15 ML: 1.2 RINSE ORAL at 21:09

## 2023-11-04 RX ADMIN — CLEVIPIDINE 10 MG/HR: 0.5 EMULSION INTRAVENOUS at 22:02

## 2023-11-04 RX ADMIN — CLEVIPIDINE 4 MG/HR: 0.5 EMULSION INTRAVENOUS at 15:30

## 2023-11-04 RX ADMIN — HYDRALAZINE HYDROCHLORIDE 20 MG: 20 INJECTION INTRAMUSCULAR; INTRAVENOUS at 16:27

## 2023-11-04 RX ADMIN — CEFAZOLIN SODIUM 2000 MG: 2 INJECTION, SOLUTION INTRAVENOUS at 18:11

## 2023-11-04 RX ADMIN — HYDRALAZINE HYDROCHLORIDE 20 MG: 20 INJECTION, SOLUTION INTRAMUSCULAR; INTRAVENOUS at 02:59

## 2023-11-04 RX ADMIN — HYDROCODONE BITARTRATE AND ACETAMINOPHEN 2 TABLET: 5; 325 TABLET ORAL at 21:09

## 2023-11-04 RX ADMIN — SENNOSIDES AND DOCUSATE SODIUM 2 TABLET: 50; 8.6 TABLET ORAL at 21:09

## 2023-11-04 RX ADMIN — ATORVASTATIN CALCIUM 40 MG: 20 TABLET, FILM COATED ORAL at 21:09

## 2023-11-04 RX ADMIN — ONDANSETRON 4 MG: 2 INJECTION INTRAMUSCULAR; INTRAVENOUS at 00:27

## 2023-11-04 RX ADMIN — HYDRALAZINE HYDROCHLORIDE 20 MG: 20 INJECTION INTRAMUSCULAR; INTRAVENOUS at 08:06

## 2023-11-04 RX ADMIN — CARVEDILOL 6.25 MG: 6.25 TABLET, FILM COATED ORAL at 08:28

## 2023-11-04 RX ADMIN — HYDROCODONE BITARTRATE AND ACETAMINOPHEN 2 TABLET: 5; 325 TABLET ORAL at 08:27

## 2023-11-04 NOTE — PROGRESS NOTES
POD #2 subacute aortic dissection status post repair with valve reimplantation procedure  Intubated, no sedation  Responds to stimuli, open eyes, withdraws and has basic reflexes  Fluid balance net greater than 5000 cc, had dialysis yesterday with only removal of 500 cc  Labs noted, odium 137, creatinine 4.3, hemoglobin 8.7, platelets 125, ABG showed pH of 7.59/26  Chest x-ray is congestive  Chest clear anteriorly, cor is regular, questionable murmur, abdomen is soft, feet are warm  CVP is 11, cardiac index 4  EEG shows no further seizure activity  Hemodynamically stable, waiting neurologic response, possible encephalopathy versus postictal, plan for repeat head CT, she may need a limited echo, I will favor low-level fluid removal today if tolerated, pulmonary consult, will follow closely neurologic status and if improvement then vent weaning.

## 2023-11-04 NOTE — PROGRESS NOTES
"DOS: 2023  NAME: Dee Herrera   : 1990  PCP: Margarita oWods APRN  Chief Complaint   Patient presents with    Shortness of Breath       Chief complaint: subacute aortic dissection status post repair with valve reimplantation /Seizure like activity     Subjective: Patient has been seen and evaluated today. No acute events overnight.No more clinical seizures. On continuous EEG monitoring. Repeat CT head pending.       Objective:  Vital signs: /83   Pulse 95   Temp 98.4 °F (36.9 °C)   Resp 21   Ht 165 cm (64.96\")   Wt 55.5 kg (122 lb 5.7 oz)   LMP 08/10/2022 (Approximate)   SpO2 96%   BMI 20.39 kg/m²    Gen: NAD, vitals reviewed  Does not follow commands  MS: Does not open eyes to voice, does not follow commands  Grimaces to pain  CN: Blink to threat present bilaterally, pupils pinpoint but reactive, corneal reflex present and cough and gag reflex present  Motor: Localizes to pain all 4 extremities, pronounced in bilateral lower extremities    Laboratory results:  Lab Results   Component Value Date    GLUCOSE 108 (H) 2023    CALCIUM 9.5 2023     2023    K 4.2 2023    CO2 21.4 (L) 2023     2023    BUN 17 2023    CREATININE 4.35 (H) 2023    EGFRIFAFRI 107 2021    BCR 3.9 (L) 2023    ANIONGAP 14.6 2023     Lab Results   Component Value Date    WBC 10.11 2023    HGB 8.7 (L) 2023    HCT 27.0 (L) 2023    MCV 86.3 2023     (L) 2023     Lab Results   Component Value Date    LDL 44 2022    LDL 25 2021         Lab 10/30/23  0540   HEMOGLOBIN A1C 5.10        Review of labs:   Sodium 137  Creatinine 3.9  Blood glucose 108    WBC 10.11  Hemoglobin 8.7  Platelets 125        Imaging review:   CT head done on 2023-no acute abnormality, good hypodensity or hyperdensity, mild cortical atrophy and small vessel disease     Diagnoses:  Seizure-like activity     Dee Herrear" is a 33 y.o. female past medical history of end-stage renal disease with peritoneal dialysis hypertension, chronic diastolic heart failure with pulmonary hypertension, malnutrition/poor appetite, thrombocytopenia, history of lupus on Plaquenil and CellCept at home, anemia of chronic diseases and also history of seizure disorder.     Patient got admitted for shortness of breath and was operated for ascending aortic dissection aneurysm repair, stop she developed some seizure-like activity and neurology was consulted for the same.     Patient was given Keppra 1 g load yesterday when she started having seizure-like spells, later on is on Keppra 500 mg twice daily along with 250 additional dose after dialysis.    Plan     - No more clinical seizures, final EEG read pending  - Awaiting repeat CT head   - Okay for extubation when appropriate with ICU team, if exam and mentation is not improving would go ahead and get an MRI brain looking for any changes which could have not been picked up by a CT head.   -Continue Keppra 500 mg twice daily for now along with 250 additional dose after dialysis.    Will continue to follow      MDM   Reviewed: Previous charts, nursing notes and vitals   Reviewed: Previous labs and EEG   Interpretation: Labs and EEG  Total time providing critical care is :30-74 minutes. This excluded time spent performing separately reportable procedures and services  Consults :Neurology/Stroke    [facility] speech recognition transcription software was used to create portions of this document. An attempt at proofreading has been made to minimize errors, which may be grammatical errors, nonsensical statements, inaccuracies and/or not pertinent to the context. Please call if you have any questions.     Preet Guillen MD  Neuro Hospitalist /Vascular Neurology.

## 2023-11-04 NOTE — PROGRESS NOTES
[facility] Follow Up    Chief Complaint: Follow up aortic aneurysm with dissection, aortic valve regurgitation     Interval History:   She remains obtunded.  She has been off sedation since yesterday morning.  She does not respond to painful stimuli.  She does have some spontaneous movement of the hands during my exam.  She remains on the ventilator.  She is on continuous EEG.    Objective:     Objective:  Temp:  [97.9 °F (36.6 °C)-98.8 °F (37.1 °C)] 98.6 °F (37 °C)  Heart Rate:  [65-95] 86  Resp:  [16-22] 20  BP: ()/(55-98) 132/87  FiO2 (%):  [30 %] 30 %     Intake/Output Summary (Last 24 hours) at 11/4/2023 1113  Last data filed at 11/4/2023 0800  Gross per 24 hour   Intake 867.5 ml   Output 770 ml   Net 97.5 ml     Body mass index is 20.39 kg/m².      11/02/23  0424 11/03/23  0409 11/04/23  0500   Weight: 44.4 kg (97 lb 14.2 oz) 54.1 kg (119 lb 4.3 oz) 55.5 kg (122 lb 5.7 oz)     Weight change: 1.4 kg (3 lb 1.4 oz)      Physical Exam:   General : Intubated  Neuro: Unresponsive off sedation  Lungs: CTAB. Normal respiratory effort and rate.  CV: Regular rate and rhythm, normal S1 and S2, 3 out of 6 murmur at the left upper sternal border and apex, unchanged.  Diastolic murmur has resolved.    ABD: Soft, nontender, nondistended. Positive bowel sounds.  Extr: No edema or cyanosis, moves all extremities.    Lab Review:   Results from last 7 days   Lab Units 11/04/23  0345 11/03/23  0407   SODIUM mmol/L 137 140   POTASSIUM mmol/L 4.2 4.5   CHLORIDE mmol/L 101 99   CO2 mmol/L 21.4* 24.4   BUN mg/dL 17 26*   CREATININE mg/dL 4.35* 5.20*   GLUCOSE mg/dL 108* 126*   CALCIUM mg/dL 9.5 8.8   AST (SGOT) U/L  --  55*   ALT (SGPT) U/L  --  12         Results from last 7 days   Lab Units 11/04/23  0345 11/03/23  0407   WBC 10*3/mm3 10.11 6.95   HEMOGLOBIN g/dL 8.7* 9.3*   HEMATOCRIT % 27.0* 28.5*   PLATELETS 10*3/mm3 125* 172     Results from last 7 days   Lab Units 11/03/23  0407 11/02/23  1412  "11/02/23  1313 11/02/23  1251   INR  1.20* 1.62*   < > 1.70*   APTT seconds  --  33.7  --  33.6    < > = values in this interval not displayed.     Results from last 7 days   Lab Units 11/04/23  0345 11/03/23  0407   MAGNESIUM mg/dL 2.1 2.6           Invalid input(s): \"LDLCALC\"  Results from last 7 days   Lab Units 10/30/23  1511   PROBNP pg/mL >70,000.0*         I reviewed the patient's new clinical results.  I personally viewed and interpreted the patient's EKG  Current Medications:   Scheduled Meds:[Held by provider] aspirin, 81 mg, Oral, Daily  atorvastatin, 40 mg, Oral, Nightly  carvedilol, 6.25 mg, Oral, Q12H  ceFAZolin, 2,000 mg, Intravenous, Q24H  chlorhexidine, 15 mL, Mouth/Throat, Q12H  [Held by provider] heparin (porcine), 5,000 Units, Subcutaneous, Q8H  levETIRAcetam, 500 mg, Intravenous, Q24H  mupirocin, , Each Nare, BID  pantoprazole, 40 mg, Oral, QAM  senna-docusate sodium, 2 tablet, Oral, Nightly      Continuous Infusions:clevidipine, 2-32 mg/hr, Last Rate: 2 mg/hr (11/04/23 1026)  dexmedetomidine, 0.2-1.5 mcg/kg/hr, Last Rate: Stopped (11/02/23 2000)  DOPamine, 2-20 mcg/kg/min  EPINEPHrine, 0.02-0.1 mcg/kg/min  insulin, 0-100 Units/hr  milrinone, 0.25-0.375 mcg/kg/min  niCARdipine, 5-15 mg/hr, Last Rate: Stopped (11/02/23 1445)  nitroglycerin, 5-200 mcg/min  norepinephrine, 0.02-0.2 mcg/kg/min, Last Rate: 0.02 mcg/kg/min (11/03/23 1337)  Pharmacy Consult - Pharmacy to dose,   phenylephrine, 0.2-2 mcg/kg/min  propofol, 5-50 mcg/kg/min, Last Rate: Stopped (11/03/23 0430)  sodium chloride, 30 mL/hr, Last Rate: 30 mL/hr (11/02/23 1400)        Allergies:  Allergies   Allergen Reactions    Minoxidil Other (See Comments) and Hives     Pericardial effusion .       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  Status post repair and proximal aortic replacement on 11/2   Severe aortic valve regurgitation.  Status post repair.    Seizures.  Occurred " postoperatively.  No recurrent seizures off of sedation this morning but also unresponsive at this time.  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  On peritoneal dialysis normally.  Plan for hemodialysis postoperatively.  Hyponatremia.  Resolved.  Hypertension.  Initially hypertensive following surgery.  On clevidipine.  Chronic anemia. Stable postoperatively.  Systemic lupus erythematosus.  Quinnell and mycophenolate on hold for surgery.  Thrombocytopenia.  Improved and stable following platelet transfusion prior to surgery.    -Hemodynamically stable postop day 2 status post ascending aortic aneurysm and aortic root dissection repair with valve reimplantation.  Off all drips.  -Unfortunately mental status is a biggest issue.  Awaiting improvement in mental status prior to extubation.  -Peritoneal dialysis per renal.      Patricia Bardales MD  11/04/23  11:13 EDT

## 2023-11-04 NOTE — PROGRESS NOTES
NEUROSURGERY PROGRESS NOTE     LOS: 9 days   Patient Care Team:  Margarita Woods APRN as PCP - General (Nurse Practitioner)  Winnie Sanchez MD as Referring Physician (Obstetrics and Gynecology)  Norberto Almaraz MD PhD as Consulting Physician (Hematology and Oncology)  Lupis Thomas MD as Consulting Physician (Nephrology)  Hair Mckeon MD as Consulting Physician (Nephrology)  Juana Taylor MD as Consulting Physician (Cardiology)      Subjective     Interval History:     Postop day 1 AAA dissection repair yesterday suffering seizures postoperatively.  Neurosurgery was consulted to evaluate the findings on CT scan was suggested a small acute subdural hematoma in the interhemispheric space.  Patient still remains off sedation mechanically ventilated with poor responsiveness.  She is currently getting a 24-hour EEG with the leads in place precluding follow-up CT scan today due the fact the EEG leads would obscure the CT image.  Nursing reports the patient does seem to open her eyes more when she hears noises about the room or spoken to.  Does not respond to commands for the nurse but does withdraw to pain      History taken from: RN    Tobacco Use: Medium Risk (11/3/2023)    Patient History     Smoking Tobacco Use: Former     Smokeless Tobacco Use: Never     Passive Exposure: Past        Objective      Vital Signs  Temp:  [97.7 °F (36.5 °C)-98.8 °F (37.1 °C)] 98.4 °F (36.9 °C)  Heart Rate:  [64-95] 94  Resp:  [16-22] 22  BP: ()/(55-98) 132/87  FiO2 (%):  [30 %] 30 %  Body mass index is 20.39 kg/m².    Intake/Output last 3 shifts:  I/O last 3 completed shifts:  In: 1887.8 [I.V.:1497.8; Other:90; IV Piggyback:300]  Out: 925 [Chest Tube:425]    Intake/Output this shift:  I/O this shift:  In: -   Out: 40 [Chest Tube:40]    Physical    Patient is obtunded on the ventilator  She does open her eyes to voice but does not follow commands and seems to track with her eyes in the room drifts back off to  sleep very quickly  Patient appears to have withdrawal response in both upper and lower extremities stronger in the legs than arms  She does withdraw to pain in all 4 extremities  Gag is positive on the mechanical ventilator    Results Review:     I reviewed the patient's new clinical results.    Labs:    Lab Results (last 24 hours)       Procedure Component Value Units Date/Time    Renal Function Panel [[id number]]  (Abnormal) Collected: 11/04/23 0345    Specimen: Blood Updated: 11/04/23 0447     Glucose 108 mg/dL      BUN 17 mg/dL      Creatinine 4.35 mg/dL      Sodium 137 mmol/L      Potassium 4.2 mmol/L      Chloride 101 mmol/L      CO2 21.4 mmol/L      Calcium 9.5 mg/dL      Albumin 3.1 g/dL      Phosphorus 4.0 mg/dL      Anion Gap 14.6 mmol/L      BUN/Creatinine Ratio 3.9     eGFR 13.3 mL/min/1.73      Comment: <15 Indicative of kidney failure       Narrative:      GFR Normal >60  Chronic Kidney Disease <60  Kidney Failure <15      CBC & Differential [[id number]]  (Abnormal) Collected: 11/04/23 0345    Specimen: Blood Updated: 11/04/23 0409    Narrative:      The following orders were created for panel order CBC & Differential.  Procedure                               Abnormality         Status                     ---------                               -----------         ------                     CBC Auto Differential[[id number]]        Abnormal            Final result                 Please view results for these tests on the individual orders.    Magnesium [[id number]]  (Normal) Collected: 11/04/23 0345    Specimen: Blood Updated: 11/04/23 0446     Magnesium 2.1 mg/dL     CBC Auto Differential [[id number]]  (Abnormal) Collected: 11/04/23 0345    Specimen: Blood Updated: 11/04/23 0409     WBC 10.11 10*3/mm3      RBC 3.13 10*6/mm3      Hemoglobin 8.7 g/dL      Hematocrit 27.0 %      MCV 86.3 fL      MCH 27.8 pg      MCHC 32.2 g/dL      RDW 16.9 %      RDW-SD 52.7 fl      MPV 12.2 fL      Platelets 125 10*3/mm3       Neutrophil % 88.3 %      Lymphocyte % 3.9 %      Monocyte % 6.9 %      Eosinophil % 0.0 %      Basophil % 0.2 %      Neutrophils, Absolute 8.93 10*3/mm3      Lymphocytes, Absolute 0.39 10*3/mm3      Monocytes, Absolute 0.70 10*3/mm3      Eosinophils, Absolute 0.00 10*3/mm3      Basophils, Absolute 0.02 10*3/mm3     Blood Gas, Arterial - [[id number]]  (Abnormal) Collected: 11/04/23 0654    Specimen: Arterial Blood Updated: 11/04/23 0657     Site Arterial Line     Esteban's Test N/A     pH, Arterial 7.411 pH units      pCO2, Arterial 35.6 mm Hg      pO2, Arterial 147.2 mm Hg      HCO3, Arterial 22.6 mmol/L      Base Excess, Arterial -1.7 mmol/L      Comment: Serial Number: [id number]:  [id number]        O2 Saturation, Arterial 99.3 %      A-a DO2 0.8 mmHg      CO2 Content 23.7 mmol/L      Barometric Pressure for Blood Gas 752.1000 mmHg      Modality Adult Vent     FIO2 30 %      Ventilator Mode VC     Set Tidal Volume 400     Set Mech Resp Rate 10     Rate 17 Breaths/minute      PEEP 5     PIP 25 cmH2O      Hemodilution No     Mean Airway Pressure 9    Blood Gas, Venous - [[id number]]  (Abnormal) Collected: 11/04/23 0659    Specimen: Venous Blood Updated: 11/04/23 0702     Site [facility]     pH, Venous 7.380 pH Units      pCO2, Venous 37.9 mm Hg      pO2, Venous 53.3 mm Hg      HCO3, Venous 22.4 mmol/L      Base Excess, Venous -2.4 mmol/L      Comment: Serial Number: [id number]:  [id number]        O2 Saturation, Venous 86.7 %      CO2 Content 23.6 mmol/L      Barometric Pressure for Blood Gas 753.0000 mmHg      Modality Adult Vent     FIO2 30 %      Ventilator Mode VC     Set Tidal Volume 400     Set Mech Resp Rate 10     Rate 21 Breaths/minute      PEEP 5     PIP 29 cmH2O      Mean Airway Pressure 10     Device Comment [place] [email or web address],rn 0638 mixed venous            Imaging:    I personally reviewed the images from the following radiographic studies.    Repeat CT scan of the head is on hold until the EEG is  discontinued    Current Medications:   Scheduled Meds:[Held by provider] aspirin, 81 mg, Oral, Daily  atorvastatin, 40 mg, Oral, Nightly  carvedilol, 6.25 mg, Oral, Q12H  ceFAZolin, 2,000 mg, Intravenous, Q24H  chlorhexidine, 15 mL, Mouth/Throat, Q12H  [Held by provider] heparin (porcine), 5,000 Units, Subcutaneous, Q8H  levETIRAcetam, 500 mg, Intravenous, Q24H  mupirocin, , Each Nare, BID  pantoprazole, 40 mg, Oral, QAM  senna-docusate sodium, 2 tablet, Oral, Nightly      Continuous Infusions:clevidipine, 2-32 mg/hr, Last Rate: 2 mg/hr (11/03/23 1426)  dexmedetomidine, 0.2-1.5 mcg/kg/hr, Last Rate: Stopped (11/02/23 2000)  DOPamine, 2-20 mcg/kg/min  EPINEPHrine, 0.02-0.1 mcg/kg/min  insulin, 0-100 Units/hr  milrinone, 0.25-0.375 mcg/kg/min  niCARdipine, 5-15 mg/hr, Last Rate: Stopped (11/02/23 1445)  nitroglycerin, 5-200 mcg/min  norepinephrine, 0.02-0.2 mcg/kg/min, Last Rate: 0.02 mcg/kg/min (11/03/23 1337)  Pharmacy Consult - Pharmacy to dose,   phenylephrine, 0.2-2 mcg/kg/min  propofol, 5-50 mcg/kg/min, Last Rate: Stopped (11/03/23 0430)  sodium chloride, 30 mL/hr, Last Rate: 30 mL/hr (11/02/23 1400)        Assessment & Plan       Dissecting ascending aortic aneurysm    Vitamin D deficiency    Thrombocytopenia    Hypertension secondary to other renal disorders    Pancytopenia    Systemic lupus erythematosus    Pericardial effusion    End stage renal disease on dialysis    Seizure disorder    Elevated liver function tests    Essential hypertension    Peritoneal dialysis catheter in place    Anemia due to chronic kidney disease, on chronic dialysis    Hyponatremia    Poor appetite    Moderate malnutrition    Chronic diastolic CHF (congestive heart failure)    Aortic valve lesion    Severe aortic valve regurgitation      Plan: Tiny thin parafalcine acute subdural hematoma without mass effect as well as a more chronic appearing slight prominence of the subdural space on the right eye urinary focal atrophy or small  subdural hygroma.  No mass effect on the underlying brain or any other mass lesions or intracranial hemorrhage.  Unfortunately due to the diagnosis of acute blood in the subdural space, albeit very small, it is best to hold off on anticoagulants and antiplatelets for now until we document stability of the subdural hematoma along the falx.  Plan is to repeat the CT scan of the head once the EEG is complete.  Based on the initial evaluation and CT findings there would be no role for neurosurgical intervention.    Shukri Bal MD  11/04/23  10:00 EDT    CT head was ultimately performed this afternoon and revealed stable findings with a tiny interhemispheric subdural hematoma with no mass effect.  No new lesions or any signs of any global hypoxia on the CT scan.  Certainly nothing to address from the neurosurgical perspective.  Neurology continuing to work-up decreased mental status.  Neurosurgery will sign off but remain available for questions.  Technically the patient should remain off antiplatelet and anticoagulant agents for 10 days but given the stability of the CT scan if it is absolutely necessary from the cardiac standpoint to start antiplatelet or anticoagulant agents they should be started very cautiously.

## 2023-11-04 NOTE — PROGRESS NOTES
[facility] Progress Note      Patient Name: Dee Herrera  : 1990  MRN: [id number]  Primary Care Physician:  Margarita Woods APRN  Date of admission: 10/26/2023    Subjective     Interval History:   Follow-up end-stage renal disease on peritoneal dialysis, POD#2 s/p Type I aortic dissection repair    weaning support, now off pressors;  she had a right femoral temporary dialysis catheter placed and hemodialysis yesterday pm; remains sedated appearing on the vent    Review of Systems:   As noted above    Objective     Vitals:   Temp:  [97.7 °F (36.5 °C)-98.8 °F (37.1 °C)] 98.4 °F (36.9 °C)  Heart Rate:  [64-95] 94  Resp:  [16-22] 22  BP: ()/(55-98) 132/87  FiO2 (%):  [30 %] 30 %    Intake/Output Summary (Last 24 hours) at 2023 0946  Last data filed at 2023 0800  Gross per 24 hour   Intake 867.5 ml   Output 770 ml   Net 97.5 ml       Physical Exam:    General Appearance: On the ventilator, unresponsive, chronically ill and frail  Skin: warm and dry  HEENT: Orally intubated  Neck: no jvd, rij introducer/swan  Lungs: CTA, unlabored breathing effort  Heart: RRR, normal S1 and S2, no rub  Abdomen: soft, no guarding nondistended, normoactive bowels and PD catheter in place with clean exit site  Extremities: no edema, cyanosis or clubbing, temporary dialysis catheter in the right femoral vein  Neuro: Unable to assess    Scheduled Meds:     [Held by provider] aspirin, 81 mg, Oral, Daily  atorvastatin, 40 mg, Oral, Nightly  carvedilol, 6.25 mg, Oral, Q12H  ceFAZolin, 2,000 mg, Intravenous, Q24H  chlorhexidine, 15 mL, Mouth/Throat, Q12H  [Held by provider] heparin (porcine), 5,000 Units, Subcutaneous, Q8H  levETIRAcetam, 500 mg, Intravenous, Q24H  mupirocin, , Each Nare, BID  pantoprazole, 40 mg, Oral, QAM  senna-docusate sodium, 2 tablet, Oral, Nightly      IV Meds:   clevidipine, 2-32 mg/hr, Last Rate: 2 mg/hr (23 1003)  dexmedetomidine, 0.2-1.5 mcg/kg/hr, Last  Rate: Stopped (11/02/23 2000)  DOPamine, 2-20 mcg/kg/min  EPINEPHrine, 0.02-0.1 mcg/kg/min  insulin, 0-100 Units/hr  milrinone, 0.25-0.375 mcg/kg/min  niCARdipine, 5-15 mg/hr, Last Rate: Stopped (11/02/23 1445)  nitroglycerin, 5-200 mcg/min  norepinephrine, 0.02-0.2 mcg/kg/min, Last Rate: 0.02 mcg/kg/min (11/03/23 1337)  Pharmacy Consult - Pharmacy to dose,   phenylephrine, 0.2-2 mcg/kg/min  propofol, 5-50 mcg/kg/min, Last Rate: Stopped (11/03/23 0430)  sodium chloride, 30 mL/hr, Last Rate: 30 mL/hr (11/02/23 1400)        Results Reviewed:   I have personally reviewed the results from the time of this admission to 11/4/2023 09:46 EDT     Results from last 7 days   Lab Units 11/04/23 0345 11/03/23 0407 11/02/23 2351   SODIUM mmol/L 137 140 139   POTASSIUM mmol/L 4.2 4.5 4.4   CHLORIDE mmol/L 101 99 96*   CO2 mmol/L 21.4* 24.4 25.0   BUN mg/dL 17 26* 25*   CREATININE mg/dL 4.35* 5.20* 5.17*   CALCIUM mg/dL 9.5 8.8 9.0   BILIRUBIN mg/dL  --  0.2  --    ALK PHOS U/L  --  65  --    ALT (SGPT) U/L  --  12  --    AST (SGOT) U/L  --  55*  --    GLUCOSE mg/dL 108* 126* 163*       Estimated Creatinine Clearance: 16.4 mL/min (A) (by C-G formula based on SCr of 4.35 mg/dL (H)).    Results from last 7 days   Lab Units 11/04/23 0345 11/03/23 0407 11/02/23 2351 11/02/23  1736   MAGNESIUM mg/dL 2.1 2.6  --  2.4   PHOSPHORUS mg/dL 4.0 3.1 3.1 2.7             Results from last 7 days   Lab Units 11/04/23 0345 11/03/23 0407 11/02/23  1736 11/02/23  1412 11/02/23  1340 11/02/23  1251   WBC 10*3/mm3 10.11 6.95 5.07 4.02  --  2.86*   HEMOGLOBIN g/dL 8.7* 9.3* 9.0* 8.2*  --  5.7*   HEMOGLOBIN, POC g/dL  --   --   --   --  8.8*  --    PLATELETS 10*3/mm3 125* 172 186 162  --  117*       Results from last 7 days   Lab Units 11/03/23  0407 11/02/23  1412 11/02/23  1313 11/02/23  1251 11/02/23  1115   INR  1.20* 1.62* 1.4* 1.70* 2.21*       Assessment / Plan     ASSESSMENT:  End-stage renal disease on secondary to lupus nephritis on  peritoneal dialysis, currently on peritoneal dialysis appears to be euvolemic, off pressors, with a normal k  severe hypertension on admission improved significantly associate with noncompliance, blood pressure improved significantly, currently on nicardipine drip  Cardiomyopathy ejection fraction about 40%  Thrombocytopenia  Anemia of chronic kidney disease hemoglobin today is 9.3  SLE with thrombocytopenia  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  Copied text in this note has been reviewed and is accurate as of 11/04/23.       Thank you for involving us in the care of Dee BARTHOLOMEW Javier.  Please feel free to call with any questions.    Ze Carson MD  11/04/23  09:46 EDT    [facility]  [phone]

## 2023-11-04 NOTE — CONSULTS
Group: [facility]         CONSULT NOTE    Patient Identification:  Dee Herrera  33 y.o.  female  1990  [id number]            Requesting physician: Dr Medina    Reason for Consultation: Management of mechanical ventilation    CC: Seizures, status post repair of aortic dissection with valve sparing procedure    History of Present Illness:  33-year-old -American female with a history of lupus erythematosus and asthma who underwent open heart surgery recently on November 2, 2023 for aortic dissection repair with proximal aortic replacement and valve sparing procedure.  Unfortunately she developed subdural hematomas on CT scan of the head and also seizures.  Neurology and neurosurgery been consulted.  We have been asked to manage mechanical ventilation in this patient who has a history of asthma.  She is currently not on any hemodynamic pressors.  She has been opening her eyes to her nurse and follows a few simple commands now.  This morning she was not following any commands.  She did open her eyes for me.  She is currently intubated and mechanically ventilated and requiring only 30% FiO2.  She is going for another repeat CT scan of the head.  I reviewed operative notes by Dr. Medina and other notes from other medical specialist providing care during this hospital stay.  In February she was here for hypertensive urgency and hemodialysis.  She does have seizure disorder and also had C. difficile colitis.  She has systemic lupus erythematosus with pancytopenia.  At that time she also had pericardial effusion    Review of Systems   Unable to perform ROS: Intubated       Past Medical History:  Past Medical History:   Diagnosis Date    Anasarca     PER CT SCAN    Dry skin     ESRD (end stage renal disease) on dialysis     TUES, THZACK, SAT UMAIR [place]    History of abdominal pain     History of anemia     History of transfusion     Hypertension     Iron deficiency anemia 09/27/2021     Lupus (systemic lupus erythematosus) 07/30/2022    Migraine     Other specified nutritional anemias     Pericardial effusion     Renal insufficiency     Seizures     STATES LAST WAS 1/2023    Shortness of breath     OCCASIONAL    Vitamin D deficiency 09/27/2021       Past Surgical History:  Past Surgical History:   Procedure Laterality Date    ASCENDING AORTIC ANEURYSM REPAIR W/ MECHANICAL AORTIC VALVE REPLACEMENT N/A 11/2/2023    Procedure: CHETNA STERNOTOMY, AORTIC ROOT REPLACEMENT WITH VALVE SPARING MISHEL PROCEDURE, REPLACEMENT OF ASCENDING AORTA, RIGHT FEMORAL DIALYSIS CATHETER PLACEMENT AND PRP;  Surgeon: Chris Medina MD;  Location: [facility] CVOR;  Service: Cardiothoracic;  Laterality: N/A;    CARDIAC CATHETERIZATION N/A 06/14/2023    Procedure: Coronary angiography;  Surgeon: Juana Taylor MD;  Location: [facility] CATH INVASIVE LOCATION;  Service: Cardiovascular;  Laterality: N/A;    CARDIAC CATHETERIZATION N/A 06/14/2023    Procedure: Left heart cath;  Surgeon: Juana Taylro MD;  Location: [facility] CATH INVASIVE LOCATION;  Service: Cardiovascular;  Laterality: N/A;    CARDIAC CATHETERIZATION N/A 06/14/2023    Procedure: Right Heart Cath;  Surgeon: Juana Taylor MD;  Location: [facility] CATH INVASIVE LOCATION;  Service: Cardiovascular;  Laterality: N/A;    COLONOSCOPY N/A 7/20/2023    Procedure: COLONOSCOPY to cecum with biopsy;  Surgeon: Drew Kaminski MD;  Location: [facility] ENDOSCOPY;  Service: Gastroenterology;  Laterality: N/A;  PRE - diarrhea, constipation  POST - fair prep, normal    ENDOSCOPY N/A 7/20/2023    Procedure: ESOPHAGOGASTRODUODENOSCOPY with biopsy;  Surgeon: Drew Kaminski MD;  Location: [facility] ENDOSCOPY;  Service: Gastroenterology;  Laterality: N/A;  PRE - abn ct abd  POST - gastritis    INSERTION HEMODIALYSIS CATHETER N/A 07/26/2022    Procedure: RIGHT TUNNELED DIALYSIS CATHETER PLACEMENT;  Surgeon: Diandra Adhikari MD;  Location: [facility] MAIN OR;  Service: Vascular;  Laterality: N/A;     INSERTION PERITONEAL DIALYSIS CATHETER N/A 04/03/2023    Procedure: INSERTION PERITONEAL DIALYSIS CATHETER LAPAROSCOPIC, omentumpexy;  Surgeon: Jemal Loyola MD;  Location: [facility];  Service: General;  Laterality: N/A;    TONSILLECTOMY          Home Meds:  Medications Prior to Admission   Medication Sig Dispense Refill Last Dose    carvedilol (COREG) 25 MG tablet Take 1 tablet by mouth Every 12 (Twelve) Hours. 60 tablet 0 10/25/2023    famotidine (PEPCID) 20 MG tablet Take 1 tablet by mouth Daily. 30 tablet 0 10/25/2023    hydroxychloroquine (PLAQUENIL) 200 MG tablet Take 1 tablet by mouth Daily.   10/25/2023    mycophenolate (CELLCEPT) 500 MG tablet Take 0.5 tablets by mouth Every 12 (Twelve) Hours. 30 tablet 0 10/25/2023    ondansetron (Zofran) 4 MG tablet Take 1 tablet by mouth Every 8 (Eight) Hours As Needed for Nausea or Vomiting. 30 tablet 1 10/25/2023    predniSONE (DELTASONE) 10 MG tablet Take 1 tablet by mouth Daily. 30 tablet 0 10/25/2023    sevelamer (RENVELA) 800 MG tablet Take 1 tablet by mouth 3 (Three) Times a Day With Meals.   10/25/2023    NIFEdipine XL (PROCARDIA XL) 90 MG 24 hr tablet Take 1 tablet by mouth Daily. (Patient not taking: Reported on 10/26/2023) 30 tablet 0 Not Taking    polyethylene glycol (MIRALAX) 17 GM/SCOOP powder Take 17 g by mouth As Needed. (Patient not taking: Reported on 10/26/2023)   Not Taking    potassium chloride 10 MEQ CR tablet Take 1 tablet by mouth Daily.   More than a month    sennosides-docusate (PERICOLACE) 8.6-50 MG per tablet Take 2 tablets by mouth Daily As Needed for Constipation. (Patient not taking: Reported on 10/26/2023) 30 tablet 1 Not Taking    simethicone (MYLICON) 80 MG chewable tablet Chew 1 tablet Every 6 (Six) Hours As Needed. (Patient not taking: Reported on 10/26/2023)   Not Taking       Allergies:  Allergies   Allergen Reactions    Minoxidil Other (See Comments) and Hives     Pericardial effusion .       Social History:  "  Social History     Socioeconomic History    Marital status: Single   Tobacco Use    Smoking status: Former     Types: Cigars     Passive exposure: Past    Smokeless tobacco: Never    Tobacco comments:     Patient smoked black & mild   Vaping Use    Vaping Use: Never used   Substance and Sexual Activity    Alcohol use: Yes     Comment: social    Drug use: Yes     Types: Marijuana     Comment: OCCASIONAL    Sexual activity: Not Currently     Partners: Male     Birth control/protection: None       Family History:  Family History   Problem Relation Age of Onset    Autoimmune disease Mother     Anemia Mother     Diabetes Sister     Anemia Brother     Diabetes Maternal Grandmother     Hypertension Maternal Grandmother     Cancer Maternal Grandmother     Sickle cell anemia Cousin     Malig Hyperthermia Neg Hx        Physical Exam:  /87 (BP Location: Left arm, Patient Position: Lying)   Pulse 86   Temp 98.6 °F (37 °C)   Resp 20   Ht 165 cm (64.96\")   Wt 55.5 kg (122 lb 5.7 oz)   LMP 08/10/2022 (Approximate)   SpO2 100%   BMI 20.39 kg/m²  Body mass index is 20.39 kg/m². 100% 55.5 kg (122 lb 5.7 oz)  Physical Exam  Constitutional:       Comments: Young female patient who is intubated, sedated but does open eyes occasionally   HENT:      Right Ear: External ear normal.      Left Ear: External ear normal.      Nose: Nose normal.      Mouth/Throat:      Comments: Oral exam shows endotracheal tube in good position  Eyes:      Conjunctiva/sclera: Conjunctivae normal.   Neck:      Thyroid: No thyromegaly.      Vascular: No JVD.      Trachea: No tracheal deviation.   Cardiovascular:      Rate and Rhythm: Normal rate and regular rhythm.      Heart sounds: Normal heart sounds. No murmur heard.  Pulmonary:      Effort: Pulmonary effort is normal.      Breath sounds: Normal breath sounds.   Abdominal:      Palpations: Abdomen is soft.      Tenderness: There is no abdominal tenderness. There is no rebound.      Comments: " Cannot palpate liver or spleen enlargement   Musculoskeletal:         General: No deformity. Normal range of motion.      Cervical back: Neck supple. No rigidity.   Skin:     General: Skin is warm.      Findings: No rash.      Comments: No palpable nodules   Neurological:      Comments: Intubated.  Does open eyes to verbal stimuli but does not follow commands for me.   Psychiatric:      Comments: Intubated, sedated         LABS:        Results from last 7 days   Lab Units 11/04/23  0345 11/03/23  0407 11/02/23  2351 11/02/23  1736 10/31/23  0520 10/30/23  1511   MAGNESIUM mg/dL 2.1 2.6  --  2.4   < >  --    SODIUM mmol/L 137 140 139 141   < >  --    POTASSIUM mmol/L 4.2 4.5 4.4 4.1   < >  --    CHLORIDE mmol/L 101 99 96* 98   < >  --    CO2 mmol/L 21.4* 24.4 25.0 28.0   < >  --    BUN mg/dL 17 26* 25* 21*   < >  --    CREATININE mg/dL 4.35* 5.20* 5.17* 5.02*   < >  --    GLUCOSE mg/dL 108* 126* 163* 132*   < >  --    CALCIUM mg/dL 9.5 8.8 9.0 9.0   < >  --    WBC 10*3/mm3 10.11 6.95  --  5.07   < >  --    HEMOGLOBIN g/dL 8.7* 9.3*  --  9.0*   < >  --    HEMOGLOBIN, POC   --   --   --   --    < >  --    PLATELETS 10*3/mm3 125* 172  --  186   < >  --    ALT (SGPT) U/L  --  12  --   --   --   --    AST (SGOT) U/L  --  55*  --   --   --   --    PROBNP pg/mL  --   --   --   --   --  >70,000.0*    < > = values in this interval not displayed.     Lab Results   Component Value Date    CKTOTAL 60 07/20/2022    TROPONINT 117 (C) 10/26/2023                 Results from last 7 days   Lab Units 11/04/23  0659 11/04/23  0654 11/03/23  1000 11/02/23  1727 11/02/23  1436 11/02/23  1340 10/31/23  1647   PH, ARTERIAL pH units  --  7.411 7.599* 7.514* 7.531* 7.5160 7.439   PCO2, ARTERIAL mm Hg  --  35.6 26.3* 37.7 35.1  --  38.2   PO2 ART mm Hg  --  147.2* 125.5* 183.4* 556.0*  --  93.8   O2 SATURATION ART %  --  99.3* 99.4* 99.7* 100.0*  --  97.6   MODALITY  Adult Vent Adult Vent Adult Vent Adult Vent Adult Vent  --  Room Air          Results from last 7 days   Lab Units 11/03/23  0407 11/02/23  1412 11/02/23  1313 11/02/23  1251 11/02/23  1115   INR  1.20* 1.62* 1.4* 1.70* 2.21*         Lab Results   Component Value Date    TSH 3.020 07/20/2022     Estimated Creatinine Clearance: 16.4 mL/min (A) (by C-G formula based on SCr of 4.35 mg/dL (H)).         Imaging: I personally visualized the images of portable chest x-ray showing endotracheal tube in good position.  [place] catheter is in good position.  Both lungs are clear bilaterally, but the left lung has some reduced volume at the base indicating partial atelectasis of the lower lobe.        Assessment:  Dissecting ascending aortic aneurysm  Status post aortic dissection repair with valve sparing procedure  Need for mechanical ventilation post open heart surgery  Lupus erythematosus  Asthma, not exacerbated  Seizures  Subdural hematoma  Pancytopenia  End-stage renal disease      Recommendations:  Patient and all problems new to me during this admission.  She is quite ill.  She developed seizures and dural hemorrhages and she obviously has had these for a while since she has hygromas indicating chronicity.  She is being followed by neurosurgery and neurology and her seizures are being adequately treated with antiepileptic medications.  Her EEG has been discontinued.  Neurologically she does wake up and opens eyes but does not consistently track visually.  She does not consistently follow commands for me but for the nurse she is beginning to follow a few simple commands intermittently.  We will wait for additional input from repeat CT scan of the head and neurosurgery team.  Continue antiepileptics.  As long as her neurologic status continues to improve, and she becomes lucid enough to follow all commands, we can start trying spontaneous breathing trial to extubate the patient.  Her lungs are stable, there is no wheezing on exam and there is no evidence of active asthma at this time.  We will  follow along with you.    Total patient care time was 64 minutes      Patient was placed in face mask upon entering room and kept mask on throughout our encounter. I wore full protective equipment throughout this patient encounter including a face mask, gown and gloves. Hand hygiene was performed before donning protective equipment and after removal when leaving the room.    Vincent Hay MD  11/4/2023  11:48 EDT      Much of this encounter note is an electronic transcription/translation of spoken language to printed text using Dragon Software.

## 2023-11-05 ENCOUNTER — APPOINTMENT (OUTPATIENT)
Dept: GENERAL RADIOLOGY | Facility: HOSPITAL | Age: 33
DRG: 219 | End: 2023-11-05
Payer: MEDICARE

## 2023-11-05 LAB
ABO GROUP BLD: NORMAL
ANION GAP SERPL CALCULATED.3IONS-SCNC: 18.6 MMOL/L (ref 5–15)
ARTERIAL PATENCY WRIST A: ABNORMAL
ATMOSPHERIC PRESS: 749.7 MMHG
BASE EXCESS BLDA CALC-SCNC: -5.6 MMOL/L (ref 0–2)
BDY SITE: ABNORMAL
BH BB BLOOD EXPIRATION DATE: NORMAL
BH BB BLOOD TYPE BARCODE: 5100
BH BB DISPENSE STATUS: NORMAL
BH BB PRODUCT CODE: NORMAL
BH BB UNIT NUMBER: NORMAL
BLD GP AB SCN SERPL QL: NEGATIVE
BUN SERPL-MCNC: 30 MG/DL (ref 6–20)
BUN/CREAT SERPL: 5.1 (ref 7–25)
CALCIUM SPEC-SCNC: 9.2 MG/DL (ref 8.6–10.5)
CHLORIDE SERPL-SCNC: 99 MMOL/L (ref 98–107)
CO2 BLDA-SCNC: 22.9 MMOL/L (ref 23–27)
CO2 SERPL-SCNC: 18.4 MMOL/L (ref 22–29)
CREAT SERPL-MCNC: 5.89 MG/DL (ref 0.57–1)
CROSSMATCH INTERPRETATION: NORMAL
DEPRECATED RDW RBC AUTO: 52.3 FL (ref 37–54)
EGFRCR SERPLBLD CKD-EPI 2021: 9.2 ML/MIN/1.73
ERYTHROCYTE [DISTWIDTH] IN BLOOD BY AUTOMATED COUNT: 17 % (ref 12.3–15.4)
GLUCOSE BLDC GLUCOMTR-MCNC: 86 MG/DL (ref 70–130)
GLUCOSE BLDC GLUCOMTR-MCNC: 93 MG/DL (ref 70–130)
GLUCOSE SERPL-MCNC: 90 MG/DL (ref 65–99)
HCO3 BLDA-SCNC: 21.5 MMOL/L (ref 22–28)
HCT VFR BLD AUTO: 25.5 % (ref 34–46.6)
HEMODILUTION: NO
HGB BLD-MCNC: 8.5 G/DL (ref 12–15.9)
INHALED O2 CONCENTRATION: 30 %
Lab: ABNORMAL
MCH RBC QN AUTO: 28.1 PG (ref 26.6–33)
MCHC RBC AUTO-ENTMCNC: 33.3 G/DL (ref 31.5–35.7)
MCV RBC AUTO: 84.4 FL (ref 79–97)
MODALITY: ABNORMAL
NOTIFIED WHO: ABNORMAL
O2 A-A PPRESDIFF RESPIRATORY: 3.2 MMHG
PCO2 BLDA: 48.1 MM HG (ref 35–45)
PEEP RESPIRATORY: 5 CM[H2O]
PH BLDA: 7.26 PH UNITS (ref 7.35–7.45)
PHOSPHATE SERPL-MCNC: 3.9 MG/DL (ref 2.5–4.5)
PLATELET # BLD AUTO: 83 10*3/MM3 (ref 140–450)
PMV BLD AUTO: ABNORMAL FL
PO2 BLDA: 526.7 MM HG (ref 80–100)
POTASSIUM SERPL-SCNC: 4.2 MMOL/L (ref 3.5–5.2)
PSV: 8 CMH2O
RBC # BLD AUTO: 3.02 10*6/MM3 (ref 3.77–5.28)
READ BACK: YES
RH BLD: POSITIVE
SAO2 % BLDCOA: 100 % (ref 92–98.5)
SODIUM SERPL-SCNC: 136 MMOL/L (ref 136–145)
T&S EXPIRATION DATE: NORMAL
TOTAL RATE: 25 BREATHS/MINUTE
UNIT  ABO: NORMAL
UNIT  RH: NORMAL
VENTILATOR MODE: ABNORMAL
WBC NRBC COR # BLD: 10.07 10*3/MM3 (ref 3.4–10.8)

## 2023-11-05 PROCEDURE — 25010000002 LEVETRIRACETAM PER 10 MG: Performed by: STUDENT IN AN ORGANIZED HEALTH CARE EDUCATION/TRAINING PROGRAM

## 2023-11-05 PROCEDURE — 71045 X-RAY EXAM CHEST 1 VIEW: CPT

## 2023-11-05 PROCEDURE — 86900 BLOOD TYPING SEROLOGIC ABO: CPT

## 2023-11-05 PROCEDURE — 86923 COMPATIBILITY TEST ELECTRIC: CPT

## 2023-11-05 PROCEDURE — 80048 BASIC METABOLIC PNL TOTAL CA: CPT | Performed by: NURSE PRACTITIONER

## 2023-11-05 PROCEDURE — C9254 INJECTION, LACOSAMIDE: HCPCS | Performed by: STUDENT IN AN ORGANIZED HEALTH CARE EDUCATION/TRAINING PROGRAM

## 2023-11-05 PROCEDURE — 94799 UNLISTED PULMONARY SVC/PX: CPT

## 2023-11-05 PROCEDURE — 86901 BLOOD TYPING SEROLOGIC RH(D): CPT | Performed by: THORACIC SURGERY (CARDIOTHORACIC VASCULAR SURGERY)

## 2023-11-05 PROCEDURE — 99024 POSTOP FOLLOW-UP VISIT: CPT | Performed by: THORACIC SURGERY (CARDIOTHORACIC VASCULAR SURGERY)

## 2023-11-05 PROCEDURE — 25010000002 CLEVIDIPINE BUTYRATE PER 1 MG: Performed by: NURSE PRACTITIONER

## 2023-11-05 PROCEDURE — 99232 SBSQ HOSP IP/OBS MODERATE 35: CPT | Performed by: INTERNAL MEDICINE

## 2023-11-05 PROCEDURE — 85027 COMPLETE CBC AUTOMATED: CPT | Performed by: NURSE PRACTITIONER

## 2023-11-05 PROCEDURE — 25010000002 LACOSAMIDE 200 MG/20ML SOLUTION: Performed by: STUDENT IN AN ORGANIZED HEALTH CARE EDUCATION/TRAINING PROGRAM

## 2023-11-05 PROCEDURE — 99232 SBSQ HOSP IP/OBS MODERATE 35: CPT | Performed by: STUDENT IN AN ORGANIZED HEALTH CARE EDUCATION/TRAINING PROGRAM

## 2023-11-05 PROCEDURE — 82803 BLOOD GASES ANY COMBINATION: CPT

## 2023-11-05 PROCEDURE — 86900 BLOOD TYPING SEROLOGIC ABO: CPT | Performed by: THORACIC SURGERY (CARDIOTHORACIC VASCULAR SURGERY)

## 2023-11-05 PROCEDURE — 94003 VENT MGMT INPAT SUBQ DAY: CPT

## 2023-11-05 PROCEDURE — 25010000002 LORAZEPAM PER 2 MG: Performed by: INTERNAL MEDICINE

## 2023-11-05 PROCEDURE — 82948 REAGENT STRIP/BLOOD GLUCOSE: CPT

## 2023-11-05 PROCEDURE — 25010000002 HEPARIN (PORCINE) PER 1000 UNITS: Performed by: INTERNAL MEDICINE

## 2023-11-05 PROCEDURE — 86850 RBC ANTIBODY SCREEN: CPT | Performed by: THORACIC SURGERY (CARDIOTHORACIC VASCULAR SURGERY)

## 2023-11-05 PROCEDURE — 25010000002 MORPHINE PER 10 MG: Performed by: NURSE PRACTITIONER

## 2023-11-05 PROCEDURE — 25010000002 MIDAZOLAM PER 1 MG: Performed by: NURSE PRACTITIONER

## 2023-11-05 PROCEDURE — P9016 RBC LEUKOCYTES REDUCED: HCPCS

## 2023-11-05 PROCEDURE — 25010000002 CEFAZOLIN IN DEXTROSE 2-4 GM/100ML-% SOLUTION: Performed by: HOSPITALIST

## 2023-11-05 PROCEDURE — 84100 ASSAY OF PHOSPHORUS: CPT | Performed by: INTERNAL MEDICINE

## 2023-11-05 PROCEDURE — 25010000002 HYDROCORTISONE SOD SUC (PF) 100 MG RECONSTITUTED SOLUTION: Performed by: THORACIC SURGERY (CARDIOTHORACIC VASCULAR SURGERY)

## 2023-11-05 PROCEDURE — 94761 N-INVAS EAR/PLS OXIMETRY MLT: CPT

## 2023-11-05 PROCEDURE — 25010000002 HYDRALAZINE PER 20 MG: Performed by: THORACIC SURGERY (CARDIOTHORACIC VASCULAR SURGERY)

## 2023-11-05 PROCEDURE — C9248 INJ, CLEVIDIPINE BUTYRATE: HCPCS | Performed by: NURSE PRACTITIONER

## 2023-11-05 RX ORDER — LACOSAMIDE 10 MG/ML
400 INJECTION, SOLUTION INTRAVENOUS ONCE
Status: COMPLETED | OUTPATIENT
Start: 2023-11-05 | End: 2023-11-05

## 2023-11-05 RX ORDER — LEVETIRACETAM 250 MG/1
250 TABLET ORAL ONCE
Qty: 1 TABLET | Refills: 0 | Status: COMPLETED | OUTPATIENT
Start: 2023-11-05 | End: 2023-11-05

## 2023-11-05 RX ORDER — LACOSAMIDE 10 MG/ML
50 INJECTION, SOLUTION INTRAVENOUS EVERY 12 HOURS
Status: DISCONTINUED | OUTPATIENT
Start: 2023-11-06 | End: 2023-11-15

## 2023-11-05 RX ORDER — AMLODIPINE BESYLATE 5 MG/1
5 TABLET ORAL
Status: DISCONTINUED | OUTPATIENT
Start: 2023-11-05 | End: 2023-11-08

## 2023-11-05 RX ORDER — LORAZEPAM 2 MG/ML
2 INJECTION INTRAMUSCULAR EVERY 4 HOURS PRN
Status: DISPENSED | OUTPATIENT
Start: 2023-11-05 | End: 2023-11-12

## 2023-11-05 RX ORDER — PANTOPRAZOLE SODIUM 40 MG/10ML
40 INJECTION, POWDER, LYOPHILIZED, FOR SOLUTION INTRAVENOUS ONCE
Status: COMPLETED | OUTPATIENT
Start: 2023-11-05 | End: 2023-11-05

## 2023-11-05 RX ORDER — CEFAZOLIN SODIUM 1 G/50ML
1000 INJECTION, SOLUTION INTRAVENOUS ONCE
Status: DISCONTINUED | OUTPATIENT
Start: 2023-11-05 | End: 2023-11-05

## 2023-11-05 RX ORDER — LEVETIRACETAM 500 MG/5ML
500 INJECTION, SOLUTION, CONCENTRATE INTRAVENOUS EVERY 12 HOURS SCHEDULED
Status: DISCONTINUED | OUTPATIENT
Start: 2023-11-05 | End: 2023-11-09

## 2023-11-05 RX ORDER — CEFAZOLIN SODIUM 2 G/100ML
2000 INJECTION, SOLUTION INTRAVENOUS ONCE
Status: COMPLETED | OUTPATIENT
Start: 2023-11-05 | End: 2023-11-05

## 2023-11-05 RX ADMIN — MUPIROCIN 1 APPLICATION: 20 OINTMENT TOPICAL at 08:40

## 2023-11-05 RX ADMIN — CLEVIPIDINE 16 MG/HR: 0.5 EMULSION INTRAVENOUS at 21:15

## 2023-11-05 RX ADMIN — ACETAMINOPHEN 650 MG: 160 SOLUTION ORAL at 20:47

## 2023-11-05 RX ADMIN — LACOSAMIDE 50 MG: 10 INJECTION INTRAVENOUS at 23:36

## 2023-11-05 RX ADMIN — LEVETIRACETAM 500 MG: 500 INJECTION, SOLUTION INTRAVENOUS at 08:39

## 2023-11-05 RX ADMIN — HYDRALAZINE HYDROCHLORIDE 20 MG: 20 INJECTION INTRAMUSCULAR; INTRAVENOUS at 17:24

## 2023-11-05 RX ADMIN — ACETAMINOPHEN 650 MG: 160 SOLUTION ORAL at 03:54

## 2023-11-05 RX ADMIN — PANTOPRAZOLE SODIUM 40 MG: 40 INJECTION, POWDER, FOR SOLUTION INTRAVENOUS at 06:23

## 2023-11-05 RX ADMIN — LACOSAMIDE 400 MG: 10 INJECTION INTRAVENOUS at 08:38

## 2023-11-05 RX ADMIN — CARVEDILOL 6.25 MG: 6.25 TABLET, FILM COATED ORAL at 09:23

## 2023-11-05 RX ADMIN — CLEVIPIDINE 10 MG/HR: 0.5 EMULSION INTRAVENOUS at 07:30

## 2023-11-05 RX ADMIN — HEPARIN SODIUM 3000 UNITS: 1000 INJECTION INTRAVENOUS; SUBCUTANEOUS at 17:19

## 2023-11-05 RX ADMIN — LEVETIRACETAM 500 MG: 500 INJECTION, SOLUTION INTRAVENOUS at 20:47

## 2023-11-05 RX ADMIN — 0.12% CHLORHEXIDINE GLUCONATE 15 ML: 1.2 RINSE ORAL at 20:47

## 2023-11-05 RX ADMIN — CLEVIPIDINE 10 MG/HR: 0.5 EMULSION INTRAVENOUS at 04:27

## 2023-11-05 RX ADMIN — SENNOSIDES AND DOCUSATE SODIUM 2 TABLET: 50; 8.6 TABLET ORAL at 20:47

## 2023-11-05 RX ADMIN — CLEVIPIDINE 16 MG/HR: 0.5 EMULSION INTRAVENOUS at 22:28

## 2023-11-05 RX ADMIN — 0.12% CHLORHEXIDINE GLUCONATE 15 ML: 1.2 RINSE ORAL at 08:40

## 2023-11-05 RX ADMIN — CLEVIPIDINE 10 MG/HR: 0.5 EMULSION INTRAVENOUS at 02:00

## 2023-11-05 RX ADMIN — LEVETIRACETAM 250 MG: 250 TABLET, FILM COATED ORAL at 18:04

## 2023-11-05 RX ADMIN — CLEVIPIDINE 16 MG/HR: 0.5 EMULSION INTRAVENOUS at 18:34

## 2023-11-05 RX ADMIN — MIDAZOLAM 2 MG: 1 INJECTION INTRAMUSCULAR; INTRAVENOUS at 16:37

## 2023-11-05 RX ADMIN — CLEVIPIDINE 8 MG/HR: 0.5 EMULSION INTRAVENOUS at 10:13

## 2023-11-05 RX ADMIN — CLEVIPIDINE 13 MG/HR: 0.5 EMULSION INTRAVENOUS at 12:27

## 2023-11-05 RX ADMIN — CLEVIPIDINE 13 MG/HR: 0.5 EMULSION INTRAVENOUS at 14:33

## 2023-11-05 RX ADMIN — MUPIROCIN 1 APPLICATION: 20 OINTMENT TOPICAL at 20:47

## 2023-11-05 RX ADMIN — ATORVASTATIN CALCIUM 40 MG: 20 TABLET, FILM COATED ORAL at 20:47

## 2023-11-05 RX ADMIN — LORAZEPAM 2 MG: 2 INJECTION INTRAMUSCULAR; INTRAVENOUS at 00:50

## 2023-11-05 RX ADMIN — MORPHINE SULFATE 4 MG: 2 INJECTION, SOLUTION INTRAMUSCULAR; INTRAVENOUS at 17:19

## 2023-11-05 RX ADMIN — CLEVIPIDINE 16 MG/HR: 0.5 EMULSION INTRAVENOUS at 19:51

## 2023-11-05 RX ADMIN — AMLODIPINE BESYLATE 5 MG: 5 TABLET ORAL at 11:25

## 2023-11-05 RX ADMIN — CEFAZOLIN SODIUM 2000 MG: 2 INJECTION, SOLUTION INTRAVENOUS at 11:23

## 2023-11-05 RX ADMIN — CARVEDILOL 6.25 MG: 6.25 TABLET, FILM COATED ORAL at 20:47

## 2023-11-05 RX ADMIN — HYDROCORTISONE SODIUM SUCCINATE 100 MG: 100 INJECTION, POWDER, FOR SOLUTION INTRAMUSCULAR; INTRAVENOUS at 08:39

## 2023-11-05 NOTE — PROGRESS NOTES
[facility] Progress Note      Patient Name: Dee Herrera  : 1990  MRN: [id number]  Primary Care Physician:  Margarita Woods APRN  Date of admission: 10/26/2023    Subjective     Interval History:   Follow-up end-stage renal disease on peritoneal dialysis, POD#2 s/p Type I aortic dissection repair    weaning support, now off pressors;  she had a right femoral temporary dialysis catheter placed; remains sedated appearing on the vent    Review of Systems:   As noted above    Objective     Vitals:   Temp:  [98.1 °F (36.7 °C)-99.5 °F (37.5 °C)] 99.3 °F (37.4 °C)  Heart Rate:  [75-93] 82  Resp:  [17-26] 26  BP: (118-137)/(78-94) 124/83  FiO2 (%):  [30 %] 30 %    Intake/Output Summary (Last 24 hours) at 2023 0944  Last data filed at 2023 0500  Gross per 24 hour   Intake 954.2 ml   Output 160 ml   Net 794.2 ml       Physical Exam:    General Appearance: On the ventilator, unresponsive, chronically ill thin and frail  Skin: warm and dry  HEENT: Orally intubated  Neck: ++ jvd today, rij introducer/swan  Lungs: CTA, unlabored breathing effort  Heart: RRR, normal S1 and S2, no rub  Abdomen: soft, no guarding nondistended, normoactive bowels and PD catheter in place with clean exit site  Extremities: no edema, cyanosis or clubbing, temporary dialysis catheter in the right femoral vein  Neuro: Unable to assess    Scheduled Meds:     amLODIPine, 5 mg, Oral, Q24H  [Held by provider] aspirin, 81 mg, Oral, Daily  atorvastatin, 40 mg, Oral, Nightly  carvedilol, 6.25 mg, Oral, Q12H  ceFAZolin, 2,000 mg, Intravenous, Once  chlorhexidine, 15 mL, Mouth/Throat, Q12H  [Held by provider] heparin (porcine), 5,000 Units, Subcutaneous, Q8H  [START ON 2023] Lacosamide, 50 mg, Intravenous, Q12H  levETIRAcetam, 500 mg, Intravenous, Q12H  levETIRAcetam, 250 mg, Oral, Once  mupirocin, , Each Nare, BID  pantoprazole, 40 mg, Oral, QAM  senna-docusate sodium, 2 tablet, Oral, Nightly      IV  Meds:   clevidipine, 2-32 mg/hr, Last Rate: 10 mg/hr (11/05/23 0730)  dexmedetomidine, 0.2-1.5 mcg/kg/hr, Last Rate: Stopped (11/02/23 2000)  DOPamine, 2-20 mcg/kg/min  EPINEPHrine, 0.02-0.1 mcg/kg/min  insulin, 0-100 Units/hr  milrinone, 0.25-0.375 mcg/kg/min  niCARdipine, 5-15 mg/hr, Last Rate: Stopped (11/02/23 1445)  nitroglycerin, 5-200 mcg/min  norepinephrine, 0.02-0.2 mcg/kg/min, Last Rate: 0.02 mcg/kg/min (11/03/23 1337)  Pharmacy Consult - Pharmacy to dose,   phenylephrine, 0.2-2 mcg/kg/min  propofol, 5-50 mcg/kg/min, Last Rate: Stopped (11/03/23 0430)  sodium chloride, 30 mL/hr, Last Rate: 30 mL/hr (11/02/23 1400)        Results Reviewed:   I have personally reviewed the results from the time of this admission to 11/5/2023 09:44 EST     Results from last 7 days   Lab Units 11/05/23  0301 11/04/23  0345 11/03/23  0407   SODIUM mmol/L 136 137 140   POTASSIUM mmol/L 4.2 4.2 4.5   CHLORIDE mmol/L 99 101 99   CO2 mmol/L 18.4* 21.4* 24.4   BUN mg/dL 30* 17 26*   CREATININE mg/dL 5.89* 4.35* 5.20*   CALCIUM mg/dL 9.2 9.5 8.8   BILIRUBIN mg/dL  --   --  0.2   ALK PHOS U/L  --   --  65   ALT (SGPT) U/L  --   --  12   AST (SGOT) U/L  --   --  55*   GLUCOSE mg/dL 90 108* 126*       Estimated Creatinine Clearance: 12.1 mL/min (A) (by C-G formula based on SCr of 5.89 mg/dL (H)).    Results from last 7 days   Lab Units 11/05/23  0301 11/04/23  0345 11/03/23  0407 11/02/23  2351 11/02/23  1736   MAGNESIUM mg/dL  --  2.1 2.6  --  2.4   PHOSPHORUS mg/dL 3.9 4.0 3.1   < > 2.7    < > = values in this interval not displayed.             Results from last 7 days   Lab Units 11/05/23  0301 11/04/23  0345 11/03/23  0407 11/02/23  1736 11/02/23  1412   WBC 10*3/mm3 10.07 10.11 6.95 5.07 4.02   HEMOGLOBIN g/dL 8.5* 8.7* 9.3* 9.0* 8.2*   PLATELETS 10*3/mm3 83* 125* 172 186 162       Results from last 7 days   Lab Units 11/03/23  0407 11/02/23  1412 11/02/23  1313 11/02/23  1251 11/02/23  1115   INR  1.20* 1.62* 1.4* 1.70* 2.21*        Assessment / Plan     ASSESSMENT:  End-stage renal disease on secondary to lupus nephritis on peritoneal dialysis, currently off pressors, scheduled for hemodialysis today  severe hypertension on admission improved significantly associate with noncompliance, blood pressure improved significantly, currently on clevidipine drip  Cardiomyopathy ejection fraction about 40%  Thrombocytopenia  Anemia of chronic kidney disease hemoglobin today is 9.3  SLE with thrombocytopenia  Mitral and aortic valve insufficiency with DeBakey type I dissection which is felt to be either subacute or chronic, she underwent repair earlier yesteray  Seizure disorder    PLAN:  Hemodialysis today which should help with bp control  Add amlodpine  Surveillance lab  I discussed the case with the patient's nurse at the bedside    I reviewed the chart and other providers note, I reviewed imaging and lab data.  Copied text in this note has been reviewed and is accurate as of 11/05/23.       Thank you for involving us in the care of Dee Herrera.  Please feel free to call with any questions.    Ze Carson MD  11/05/23  09:44 [facility]    [facility]  [phone]

## 2023-11-05 NOTE — PROGRESS NOTES
[facility] Clinical Pharmacy Services: Keppra Consult    Dee Herrera has a pharmacy consult to dose Keppra per Dr. Lowe's request.     Indication:  Seizures    Relevant clinical data and objective history reviewed:    Past Medical History:   Diagnosis Date    Anasarca     PER CT SCAN    Dry skin     ESRD (end stage renal disease) on dialysis     TUES, THURS, SAT [address]MA[address]    History of abdominal pain     History of anemia     History of transfusion     Hypertension     Iron deficiency anemia 09/27/2021    Lupus (systemic lupus erythematosus) 07/30/2022    Migraine     Other specified nutritional anemias     Pericardial effusion     Renal insufficiency     Seizures     STATES LAST WAS 1/2023    Shortness of breath     OCCASIONAL    Vitamin D deficiency 09/27/2021     Assessment/Plan    Current dose: Keppra 500mg q12h.   Received LD Keppra 1g IV 11/3 at 0126.     will order a supplemental dose of Keppra 250mg x1 to be given after 11/5 HD session. (medication is 50% dialyzable)     Thank you for this consult and please contact pharmacy with any questions or concerns.          Eliecer Cramer, PharmD  Clinical Pharmacist

## 2023-11-05 NOTE — PROGRESS NOTES
[facility] Follow Up    Chief Complaint: Follow up aortic aneurysm with dissection, aortic valve regurgitation     Interval History:   Hemodynamically remains stable but has ongoing seizures. Remains intubated. Does not appear overloaded on exam. She is more interactive per RN    Objective:     Objective:  Temp:  [98.1 °F (36.7 °C)-99.5 °F (37.5 °C)] 99.3 °F (37.4 °C)  Heart Rate:  [75-88] 82  Resp:  [17-26] 21  BP: (118-137)/(78-94) 124/83  FiO2 (%):  [30 %] 30 %     Intake/Output Summary (Last 24 hours) at 11/5/2023 1150  Last data filed at 11/5/2023 0500  Gross per 24 hour   Intake 954.2 ml   Output 160 ml   Net 794.2 ml     Body mass index is 20.39 kg/m².      11/02/23  0424 11/03/23  0409 11/04/23  0500   Weight: 44.4 kg (97 lb 14.2 oz) 54.1 kg (119 lb 4.3 oz) 55.5 kg (122 lb 5.7 oz)     Weight change:       Physical Exam:   General : Intubated  Neuro: Unresponsive off sedation  Lungs: CTAB. Normal respiratory effort and rate.  CV: Regular rate and rhythm, normal S1 and S2, 3 out of 6 murmur at the left upper sternal border and apex, unchanged.  Diastolic murmur has resolved.    ABD: Soft, nontender, nondistended. Positive bowel sounds.  Extr: No edema or cyanosis, moves all extremities.    Lab Review:   Results from last 7 days   Lab Units 11/05/23  0301 11/04/23  0345 11/03/23  0407   SODIUM mmol/L 136 137 140   POTASSIUM mmol/L 4.2 4.2 4.5   CHLORIDE mmol/L 99 101 99   CO2 mmol/L 18.4* 21.4* 24.4   BUN mg/dL 30* 17 26*   CREATININE mg/dL 5.89* 4.35* 5.20*   GLUCOSE mg/dL 90 108* 126*   CALCIUM mg/dL 9.2 9.5 8.8   AST (SGOT) U/L  --   --  55*   ALT (SGPT) U/L  --   --  12         Results from last 7 days   Lab Units 11/05/23  0301 11/04/23  0345   WBC 10*3/mm3 10.07 10.11   HEMOGLOBIN g/dL 8.5* 8.7*   HEMATOCRIT % 25.5* 27.0*   PLATELETS 10*3/mm3 83* 125*     Results from last 7 days   Lab Units 11/03/23  0407 11/02/23  1412 11/02/23  1313 11/02/23  1251   INR  1.20* 1.62*   < > 1.70*  "  APTT seconds  --  33.7  --  33.6    < > = values in this interval not displayed.     Results from last 7 days   Lab Units 11/04/23  0345 11/03/23  0407   MAGNESIUM mg/dL 2.1 2.6           Invalid input(s): \"LDLCALC\"  Results from last 7 days   Lab Units 10/30/23  1511   PROBNP pg/mL >70,000.0*         I reviewed the patient's new clinical results.  I personally viewed and interpreted the patient's EKG  Current Medications:   Scheduled Meds:amLODIPine, 5 mg, Oral, Q24H  [Held by provider] aspirin, 81 mg, Oral, Daily  atorvastatin, 40 mg, Oral, Nightly  carvedilol, 6.25 mg, Oral, Q12H  ceFAZolin, 2,000 mg, Intravenous, Once  chlorhexidine, 15 mL, Mouth/Throat, Q12H  [Held by provider] heparin (porcine), 5,000 Units, Subcutaneous, Q8H  [START ON 11/6/2023] Lacosamide, 50 mg, Intravenous, Q12H  levETIRAcetam, 500 mg, Intravenous, Q12H  levETIRAcetam, 250 mg, Oral, Once  mupirocin, , Each Nare, BID  pantoprazole, 40 mg, Oral, QAM  senna-docusate sodium, 2 tablet, Oral, Nightly      Continuous Infusions:clevidipine, 2-32 mg/hr, Last Rate: 8 mg/hr (11/05/23 1013)  dexmedetomidine, 0.2-1.5 mcg/kg/hr, Last Rate: Stopped (11/02/23 2000)  DOPamine, 2-20 mcg/kg/min  EPINEPHrine, 0.02-0.1 mcg/kg/min  insulin, 0-100 Units/hr  milrinone, 0.25-0.375 mcg/kg/min  niCARdipine, 5-15 mg/hr, Last Rate: Stopped (11/02/23 1445)  nitroglycerin, 5-200 mcg/min  norepinephrine, 0.02-0.2 mcg/kg/min, Last Rate: 0.02 mcg/kg/min (11/03/23 1337)  Pharmacy Consult - Pharmacy to dose,   phenylephrine, 0.2-2 mcg/kg/min  propofol, 5-50 mcg/kg/min, Last Rate: Stopped (11/03/23 0430)  sodium chloride, 30 mL/hr, Last Rate: 30 mL/hr (11/02/23 1400)        Allergies:  Allergies   Allergen Reactions    Minoxidil Other (See Comments) and Hives     Pericardial effusion .       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  Status post repair and proximal aortic replacement on 11/2   Severe aortic valve " regurgitation.  Status post repair.    Seizures.  Occurred postoperatively.  No recurrent seizures off of sedation this morning but also unresponsive at this time.  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  On peritoneal dialysis normally.  Plan for hemodialysis postoperatively.  Hyponatremia.  Resolved.  Hypertension.  Initially hypertensive following surgery.  On clevidipine.  Chronic anemia. Stable postoperatively.  Systemic lupus erythematosus.  [place] and mycophenolate on hold for surgery.  Thrombocytopenia.  Improved and stable following platelet transfusion prior to surgery.    - PD today per renal  - Agree with amlodipine. At home was on nifedipine. Uptitrate as needed.   - vent mgmt per pulm.     Patricia Bardales MD  11/05/23  11:50 EST

## 2023-11-05 NOTE — PROGRESS NOTES
POD #3 aortic valve implantation procedure for aortic dissection  Patient is mildly sedated, had a seizure overnight  She woke up, follow commands and move all 4 extremities  Vitals are stable, hypertensive on Cleviprex  Fluid balance net 900 cc positive  Chest tube output low  Labs noted  Chest x-ray cleared  Lungs are clear bilaterally, cor is regular with a soft murmur, dressings are intact and feet are warm  Slow progress, neurologically improved but had another seizure.  Plan for hemodialysis, remove chest tubes and [place], if she does not have seizures disease she may be ready for extubation, wean IV antihypertensive medication, may be clonidine patch  Keep systolic blood pressure below 120 to protect aortic repair

## 2023-11-05 NOTE — PROGRESS NOTES
"DOS: 2023  NAME: Dee Herrera   : 1990  PCP: Margarita Woods APRN  Chief Complaint   Patient presents with    Shortness of Breath       Chief complaint: subacute aortic dissection status post repair with valve reimplantation /Seizure like activity .    Subjective: Patient has been seen and evaluated, no acute events overnight.    She did have 2 episodes of generalized tonic-clonic seizures with gaze deviation(unsure at bedside) lasted around 30 to 40 seconds.      Objective:  Vital signs: /85 (BP Location: Left arm, Patient Position: Lying)   Pulse 87   Temp 99.3 °F (37.4 °C) (Core)   Resp 26   Ht 165 cm (64.96\")   Wt 55.5 kg (122 lb 5.7 oz)   LMP 08/10/2022 (Approximate)   SpO2 100%   BMI 20.39 kg/m²      Does not follow commands  MS: Eyes open tracking people in the room, follows commands partially  Grimaces to pain  CN: Blink to threat present bilaterally, pupils pinpoint but reactive, corneal reflex present and cough and gag reflex present  Motor: Localizes to pain all 4 extremities, pronounced in bilateral lower extremities    Laboratory results:  Lab Results   Component Value Date    GLUCOSE 90 2023    CALCIUM 9.2 2023     2023    K 4.2 2023    CO2 18.4 (L) 2023    CL 99 2023    BUN 30 (H) 2023    CREATININE 5.89 (H) 2023    EGFRIFAFRI 107 2021    BCR 5.1 (L) 2023    ANIONGAP 18.6 (H) 2023     Lab Results   Component Value Date    WBC 10.07 2023    HGB 8.5 (L) 2023    HCT 25.5 (L) 2023    MCV 84.4 2023    PLT 83 (L) 2023     Lab Results   Component Value Date    LDL 44 2022    LDL 25 2021         Lab 10/30/23  0540   HEMOGLOBIN A1C 5.10        Review of labs:   Sodium 136  Creatinine 5.89  Blood glucose 80    WBC 10.07  Hemoglobin 8.5 and platelets 83      Imaging review:   CT head done on 2023-no acute abnormality, good hypodensity or hyperdensity, mild " cortical atrophy and small vessel disease.    CT head done on 11/3/2023- There is a thin acute subdural hematoma related to the posterior aspect of the falx and extending over the medial aspect of the tentorium cerebelli on the right measuring approximately 2 mm in maximum  thickness. There is a low-attenuation hygroma appreciated overlying the  right frontal lobe laterally and superolaterally measuring approximately  3 mm in thickness, which is new versus 02/13/2023.     Repeat CT head 11/4/2023 stable subdural bleed    EEG  -Abnormal study during wakefulness and drowsiness with mild diffuse slowing of the background and intermittent bilateral posterior temporal focal slowing.  No clear-cut epileptiform activities were seen.,  Sharp transients seen in the right frontoparietal     Diagnoses:  Seizure-like activity     Dee Herrera is a 33 y.o. female past medical history of end-stage renal disease with peritoneal dialysis hypertension, chronic diastolic heart failure with pulmonary hypertension, malnutrition/poor appetite, thrombocytopenia, history of lupus on Plaquenil and CellCept at home, anemia of chronic diseases and also history of seizure disorder.     Patient got admitted for shortness of breath and was operated for ascending aortic dissection aneurysm repair, stop she developed some seizure-like activity and neurology was consulted for the same.     Patient was given Keppra 1 g load on Friday when she started having seizure-like spells, later on is on Keppra 500 mg twice daily along with 250 additional dose after dialysis.     Plan      -She had 2 clinical seizures, started on Keppra 500 mg twice daily along with Vimpat 400mg load and Vimpat 100 mg twice daily  - Okay for extubation when appropriate with ICU team, if exam and mentation is not improving would go ahead and get an MRI brain looking for any changes which could have not been picked up by a CT head.   -Continue Keppra 500 mg twice daily for  now along with 250 additional dose after dialysis.     Will continue to follow       MDM   Reviewed: Previous charts, nursing notes and vitals   Reviewed: Previous labs and CT scan    Interpretation: Labs and CT scan   Total time providing critical care is :30-74 minutes. This excluded time spent performing separately reportable procedures and services  Consults :Neurology/Stroke    [facility] speech recognition transcription software was used to create portions of this document. An attempt at proofreading has been made to minimize errors, which may be grammatical errors, nonsensical statements, inaccuracies and/or not pertinent to the context. Please call if you have any questions.     Preet Guillen MD  Neuro Hospitalist /Vascular Neurology.

## 2023-11-05 NOTE — PROGRESS NOTES
Dr. JAYLA Hay    [facility] CARDIO RECOVERY        Patient ID:  Name:  Dee Herrera  MRN:  [id number]  1990  33 y.o.  female            CC/Reason for visit: Follow up aortic aneurysm with dissection, aortic valve regurgitation     Interval hx: Patient remains intubated and mechanically ventilated.  Unfortunately she had significant episode of seizure last night during spontaneous breathing trial.  She is now opening her eyes and does raise her hands when I asked her to but she is not consistently following commands.  Neurology was notified about her seizures last night and they have increased her antiepileptic dosing.      ROS: Unobtainable, intubated    Vitals:  Vitals:    11/05/23 1000 11/05/23 1057 11/05/23 1100 11/05/23 1200   BP: 124/90  123/89 123/85   BP Location:       Patient Position:       Pulse: 82 82 82 80   Resp:  21     Temp: 99.5 °F (37.5 °C)      TempSrc:       SpO2: 100% 100% 100% 100%   Weight:       Height:         FiO2 (%): 30 %     Body mass index is 20.39 kg/m².    Intake/Output Summary (Last 24 hours) at 11/5/2023 1402  Last data filed at 11/5/2023 1000  Gross per 24 hour   Intake 894.2 ml   Output 150 ml   Net 744.2 ml       Exam:  GEN:  Wakes up  Intubated  Mechanically ventilated  Opens eyes, will raise her hands but does not consistently follow commands  LUNGS: Chest tubes in place.  Distant and diminished but overall clear breath sounds bilat, no use of accessory muscles  CV:  Sternotomy wound clear dry and intact, distant heart tones no edema  ABD:  Non tender, no enlarged liver or masses      Scheduled meds:  amLODIPine, 5 mg, Oral, Q24H  [Held by provider] aspirin, 81 mg, Oral, Daily  atorvastatin, 40 mg, Oral, Nightly  carvedilol, 6.25 mg, Oral, Q12H  chlorhexidine, 15 mL, Mouth/Throat, Q12H  [Held by provider] heparin (porcine), 5,000 Units, Subcutaneous, Q8H  [START ON 11/6/2023] Lacosamide, 50 mg, Intravenous, Q12H  levETIRAcetam, 500 mg, Intravenous,  Q12H  levETIRAcetam, 250 mg, Oral, Once  mupirocin, , Each Nare, BID  pantoprazole, 40 mg, Oral, QAM  senna-docusate sodium, 2 tablet, Oral, Nightly      IV meds:                      clevidipine, 2-32 mg/hr, Last Rate: 13 mg/hr (11/05/23 1227)  dexmedetomidine, 0.2-1.5 mcg/kg/hr, Last Rate: Stopped (11/02/23 2000)  DOPamine, 2-20 mcg/kg/min  EPINEPHrine, 0.02-0.1 mcg/kg/min  insulin, 0-100 Units/hr  milrinone, 0.25-0.375 mcg/kg/min  niCARdipine, 5-15 mg/hr, Last Rate: Stopped (11/02/23 1445)  nitroglycerin, 5-200 mcg/min  norepinephrine, 0.02-0.2 mcg/kg/min, Last Rate: 0.02 mcg/kg/min (11/03/23 1337)  Pharmacy Consult - Pharmacy to dose,   phenylephrine, 0.2-2 mcg/kg/min  propofol, 5-50 mcg/kg/min, Last Rate: Stopped (11/03/23 0430)  sodium chloride, 30 mL/hr, Last Rate: 30 mL/hr (11/02/23 1400)        Data Review:   I reviewed the patient's medications and new clinical results.            Results from last 7 days   Lab Units 11/05/23  0301 11/04/23  0345 11/03/23  0407 11/02/23  1736 11/02/23  1412 11/02/23  1340 11/02/23  1313 10/31/23  0520 10/30/23  1511   SODIUM mmol/L 136 137 140   < > 143  --   --    < >  --    POTASSIUM mmol/L 4.2 4.2 4.5   < > 4.0  --   --    < >  --    CHLORIDE mmol/L 99 101 99   < > 100  --   --    < >  --    CO2 mmol/L 18.4* 21.4* 24.4   < > 25.4  --   --    < >  --    BUN mg/dL 30* 17 26*   < > 18  --   --    < >  --    CREATININE mg/dL 5.89* 4.35* 5.20*   < > 4.67*  --   --    < >  --    CALCIUM mg/dL 9.2 9.5 8.8   < > 8.2*  --   --    < >  --    BILIRUBIN mg/dL  --   --  0.2  --   --   --   --   --   --    ALK PHOS U/L  --   --  65  --   --   --   --   --   --    ALT (SGPT) U/L  --   --  12  --   --   --   --   --   --    AST (SGOT) U/L  --   --  55*  --   --   --   --   --   --    GLUCOSE mg/dL 90 108* 126*   < > 110*  --   --    < >  --    WBC 10*3/mm3 10.07 10.11 6.95   < > 4.02  --   --    < >  --    HEMOGLOBIN g/dL 8.5* 8.7* 9.3*   < > 8.2*  --   --    < >  --    HEMOGLOBIN, POC    --   --   --   --   --    < >  --   --   --    PLATELETS 10*3/mm3 83* 125* 172   < > 162  --   --    < >  --    INR   --   --  1.20*  --  1.62*  --  1.4*   < >  --    PROBNP pg/mL  --   --   --   --   --   --   --   --  >70,000.0*    < > = values in this interval not displayed.                 Results from last 7 days   Lab Units 11/05/23  0034 11/04/23  0659 11/04/23  0654 11/03/23  1000 11/02/23  1727 11/02/23  1436 11/02/23  1340 10/31/23  1647   PH, ARTERIAL pH units 7.257*  --  7.411 7.599* 7.514* 7.531* 7.5160 7.439   PCO2, ARTERIAL mm Hg 48.1*  --  35.6 26.3* 37.7 35.1  --  38.2   PO2 ART mm Hg 526.7*  --  147.2* 125.5* 183.4* 556.0*  --  93.8   O2 SATURATION ART % 100.0*  --  99.3* 99.4* 99.7* 100.0*  --  97.6   MODALITY  Adult Vent Adult Vent Adult Vent Adult Vent Adult Vent Adult Vent  --  Room Air             ASSESSMENT:   Dissecting ascending aortic aneurysm  Status post aortic dissection repair with valve sparing procedure  Need for mechanical ventilation post open heart surgery  Lupus erythematosus  Asthma, not exacerbated  Seizures  Subdural hematoma  Pancytopenia  End-stage renal disease on hemodialysis  Chronic anemia      PLAN:  Unfortunately had seizure again last night.  Neurology is optimizing her antiepileptic regimen.  She will receive redosing of Keppra after hemodialysis.  Vimpat has been added.  Not appropriate for spontaneous breathing trial today since last night she seized during spontaneous breathing trial right before attempts at extubation.  We will continue to adjust mechanical ventilation and try to wean her again tomorrow.  Depending on how her neurologic status is tomorrow, we may extubate her tomorrow.  She is off pressors.  She will be receiving hemodialysis today and post hemodialysis dosing of antiepileptics.        Vincent Hay MD  11/5/2023

## 2023-11-05 NOTE — NURSING NOTE
HD WITHOUT INCIDENT. TOLERATED FAIR. REMOVED 1.9 L. NO MEDS ADMINISTERED. SEE [facility] FOR MEDS PER PRIMARY RN. RECEIVED 1 UNIT PRBCS. NO REACTION NOTED. STABLE POST COMPLETION OF HD.

## 2023-11-06 ENCOUNTER — ANCILLARY PROCEDURE (OUTPATIENT)
Dept: PERIOP | Facility: HOSPITAL | Age: 33
DRG: 219 | End: 2023-11-06
Payer: MEDICARE

## 2023-11-06 ENCOUNTER — ANESTHESIA EVENT (OUTPATIENT)
Dept: PERIOP | Facility: HOSPITAL | Age: 33
End: 2023-11-06
Payer: MEDICARE

## 2023-11-06 ENCOUNTER — APPOINTMENT (OUTPATIENT)
Dept: CARDIOLOGY | Facility: HOSPITAL | Age: 33
DRG: 219 | End: 2023-11-06
Payer: MEDICARE

## 2023-11-06 ENCOUNTER — APPOINTMENT (OUTPATIENT)
Dept: GENERAL RADIOLOGY | Facility: HOSPITAL | Age: 33
DRG: 219 | End: 2023-11-06
Payer: MEDICARE

## 2023-11-06 ENCOUNTER — ANESTHESIA (OUTPATIENT)
Dept: PERIOP | Facility: HOSPITAL | Age: 33
End: 2023-11-06
Payer: MEDICARE

## 2023-11-06 LAB
ALBUMIN SERPL-MCNC: 2.5 G/DL (ref 3.5–5.2)
ALBUMIN SERPL-MCNC: 2.9 G/DL (ref 3.5–5.2)
ALBUMIN SERPL-MCNC: 2.9 G/DL (ref 3.5–5.2)
ALBUMIN/GLOB SERPL: 1.3 G/DL
ALP SERPL-CCNC: 65 U/L (ref 39–117)
ALT SERPL W P-5'-P-CCNC: <5 U/L (ref 1–33)
ANION GAP SERPL CALCULATED.3IONS-SCNC: 14.9 MMOL/L (ref 5–15)
ANION GAP SERPL CALCULATED.3IONS-SCNC: 16.3 MMOL/L (ref 5–15)
ANION GAP SERPL CALCULATED.3IONS-SCNC: 17 MMOL/L (ref 5–15)
ANION GAP SERPL CALCULATED.3IONS-SCNC: 19 MMOL/L (ref 5–15)
ANISOCYTOSIS BLD QL: ABNORMAL
APTT PPP: 34.3 SECONDS (ref 22.7–35.4)
APTT PPP: 35.1 SECONDS (ref 22.7–35.4)
ARTERIAL PATENCY WRIST A: ABNORMAL
ARTERIAL PATENCY WRIST A: ABNORMAL
AST SERPL-CCNC: 22 U/L (ref 1–32)
ATMOSPHERIC PRESS: 744.9 MMHG
ATMOSPHERIC PRESS: 747.6 MMHG
BASE EXCESS BLDA CALC-SCNC: -1 MMOL/L (ref -5–5)
BASE EXCESS BLDA CALC-SCNC: -1 MMOL/L (ref -5–5)
BASE EXCESS BLDA CALC-SCNC: -2 MMOL/L (ref -5–5)
BASE EXCESS BLDA CALC-SCNC: -2.9 MMOL/L (ref 0–2)
BASE EXCESS BLDA CALC-SCNC: -3 MMOL/L (ref -5–5)
BASE EXCESS BLDA CALC-SCNC: -4 MMOL/L (ref -5–5)
BASE EXCESS BLDA CALC-SCNC: -4 MMOL/L (ref -5–5)
BASE EXCESS BLDA CALC-SCNC: -5 MMOL/L (ref -5–5)
BASE EXCESS BLDA CALC-SCNC: -8.6 MMOL/L (ref 0–2)
BASE EXCESS BLDA CALC-SCNC: 1 MMOL/L (ref -5–5)
BASE EXCESS BLDA CALC-SCNC: 4 MMOL/L (ref -5–5)
BASE EXCESS BLDA CALC-SCNC: 7 MMOL/L (ref -5–5)
BASE EXCESS BLDA CALC-SCNC: 9 MMOL/L (ref -5–5)
BASOPHILS # BLD AUTO: 0.01 10*3/MM3 (ref 0–0.2)
BASOPHILS NFR BLD AUTO: 0.1 % (ref 0–1.5)
BDY SITE: ABNORMAL
BDY SITE: ABNORMAL
BH BB BLOOD EXPIRATION DATE: NORMAL
BH BB BLOOD TYPE BARCODE: 5100
BH BB DISPENSE STATUS: NORMAL
BH BB PRODUCT CODE: NORMAL
BH BB UNIT NUMBER: NORMAL
BILIRUB SERPL-MCNC: 0.3 MG/DL (ref 0–1.2)
BUN SERPL-MCNC: 18 MG/DL (ref 6–20)
BUN SERPL-MCNC: 24 MG/DL (ref 6–20)
BUN SERPL-MCNC: 26 MG/DL (ref 6–20)
BUN SERPL-MCNC: 26 MG/DL (ref 6–20)
BUN/CREAT SERPL: 4.5 (ref 7–25)
BUN/CREAT SERPL: 5.4 (ref 7–25)
BUN/CREAT SERPL: 5.5 (ref 7–25)
BUN/CREAT SERPL: 5.6 (ref 7–25)
CA-I BLD-MCNC: 4.6 MG/DL (ref 4.6–5.4)
CA-I BLD-MCNC: 5.4 MG/DL (ref 4.6–5.4)
CA-I BLDA-SCNC: ABNORMAL MMOL/L
CA-I SERPL ISE-MCNC: 1.14 MMOL/L (ref 1.15–1.35)
CA-I SERPL ISE-MCNC: 1.35 MMOL/L (ref 1.15–1.35)
CALCIUM SPEC-SCNC: 10.1 MG/DL (ref 8.6–10.5)
CALCIUM SPEC-SCNC: 10.2 MG/DL (ref 8.6–10.5)
CALCIUM SPEC-SCNC: 7.9 MG/DL (ref 8.6–10.5)
CALCIUM SPEC-SCNC: 8.9 MG/DL (ref 8.6–10.5)
CHLORIDE SERPL-SCNC: 101 MMOL/L (ref 98–107)
CHLORIDE SERPL-SCNC: 103 MMOL/L (ref 98–107)
CHLORIDE SERPL-SCNC: 103 MMOL/L (ref 98–107)
CHLORIDE SERPL-SCNC: 104 MMOL/L (ref 98–107)
CO2 BLDA-SCNC: 14.6 MMOL/L (ref 23–27)
CO2 BLDA-SCNC: 20 MMOL/L (ref 24–29)
CO2 BLDA-SCNC: 21 MMOL/L (ref 23–27)
CO2 BLDA-SCNC: 21 MMOL/L (ref 24–29)
CO2 BLDA-SCNC: 22 MMOL/L (ref 24–29)
CO2 BLDA-SCNC: 24 MMOL/L (ref 24–29)
CO2 BLDA-SCNC: 24 MMOL/L (ref 24–29)
CO2 BLDA-SCNC: 25 MMOL/L (ref 24–29)
CO2 BLDA-SCNC: 26 MMOL/L (ref 24–29)
CO2 BLDA-SCNC: 27 MMOL/L (ref 24–29)
CO2 BLDA-SCNC: 31 MMOL/L (ref 24–29)
CO2 BLDA-SCNC: 32 MMOL/L (ref 24–29)
CO2 BLDA-SCNC: 33 MMOL/L (ref 24–29)
CO2 SERPL-SCNC: 14 MMOL/L (ref 22–29)
CO2 SERPL-SCNC: 15 MMOL/L (ref 22–29)
CO2 SERPL-SCNC: 18.7 MMOL/L (ref 22–29)
CO2 SERPL-SCNC: 20.1 MMOL/L (ref 22–29)
CREAT SERPL-MCNC: 3.96 MG/DL (ref 0.57–1)
CREAT SERPL-MCNC: 4.27 MG/DL (ref 0.57–1)
CREAT SERPL-MCNC: 4.72 MG/DL (ref 0.57–1)
CREAT SERPL-MCNC: 4.78 MG/DL (ref 0.57–1)
CROSSMATCH INTERPRETATION: NORMAL
DEPRECATED RDW RBC AUTO: 49.7 FL (ref 37–54)
DEPRECATED RDW RBC AUTO: 50.1 FL (ref 37–54)
DEPRECATED RDW RBC AUTO: 50.1 FL (ref 37–54)
DEPRECATED RDW RBC AUTO: 52.5 FL (ref 37–54)
EGFRCR SERPLBLD CKD-EPI 2021: 11.8 ML/MIN/1.73
EGFRCR SERPLBLD CKD-EPI 2021: 12 ML/MIN/1.73
EGFRCR SERPLBLD CKD-EPI 2021: 13.6 ML/MIN/1.73
EGFRCR SERPLBLD CKD-EPI 2021: 14.8 ML/MIN/1.73
ELLIPTOCYTES BLD QL SMEAR: ABNORMAL
EOSINOPHIL # BLD AUTO: 0 10*3/MM3 (ref 0–0.4)
EOSINOPHIL NFR BLD AUTO: 0 % (ref 0.3–6.2)
ERYTHROCYTE [DISTWIDTH] IN BLOOD BY AUTOMATED COUNT: 15.9 % (ref 12.3–15.4)
ERYTHROCYTE [DISTWIDTH] IN BLOOD BY AUTOMATED COUNT: 16.4 % (ref 12.3–15.4)
FIBRINOGEN PPP-MCNC: 312 MG/DL (ref 219–464)
GLOBULIN UR ELPH-MCNC: 2.3 GM/DL
GLUCOSE BLDC GLUCOMTR-MCNC: 100 MG/DL (ref 70–130)
GLUCOSE BLDC GLUCOMTR-MCNC: 101 MG/DL (ref 70–130)
GLUCOSE BLDC GLUCOMTR-MCNC: 105 MG/DL (ref 70–130)
GLUCOSE BLDC GLUCOMTR-MCNC: 106 MG/DL (ref 70–130)
GLUCOSE BLDC GLUCOMTR-MCNC: 106 MG/DL (ref 70–130)
GLUCOSE BLDC GLUCOMTR-MCNC: 107 MG/DL (ref 70–130)
GLUCOSE BLDC GLUCOMTR-MCNC: 107 MG/DL (ref 70–130)
GLUCOSE BLDC GLUCOMTR-MCNC: 113 MG/DL (ref 70–130)
GLUCOSE BLDC GLUCOMTR-MCNC: 163 MG/DL (ref 70–130)
GLUCOSE BLDC GLUCOMTR-MCNC: 87 MG/DL (ref 70–130)
GLUCOSE BLDC GLUCOMTR-MCNC: 90 MG/DL (ref 70–130)
GLUCOSE BLDC GLUCOMTR-MCNC: 97 MG/DL (ref 70–130)
GLUCOSE BLDC GLUCOMTR-MCNC: 99 MG/DL (ref 70–130)
GLUCOSE SERPL-MCNC: 108 MG/DL (ref 65–99)
GLUCOSE SERPL-MCNC: 66 MG/DL (ref 65–99)
GLUCOSE SERPL-MCNC: 67 MG/DL (ref 65–99)
GLUCOSE SERPL-MCNC: 91 MG/DL (ref 65–99)
HCO3 BLDA-SCNC: 13.9 MMOL/L (ref 22–28)
HCO3 BLDA-SCNC: 19.2 MMOL/L (ref 22–26)
HCO3 BLDA-SCNC: 20.1 MMOL/L (ref 22–26)
HCO3 BLDA-SCNC: 20.1 MMOL/L (ref 22–28)
HCO3 BLDA-SCNC: 21.2 MMOL/L (ref 22–26)
HCO3 BLDA-SCNC: 22.8 MMOL/L (ref 22–26)
HCO3 BLDA-SCNC: 23.1 MMOL/L (ref 22–26)
HCO3 BLDA-SCNC: 23.8 MMOL/L (ref 22–26)
HCO3 BLDA-SCNC: 24.3 MMOL/L (ref 22–26)
HCO3 BLDA-SCNC: 25.7 MMOL/L (ref 22–26)
HCO3 BLDA-SCNC: 29.5 MMOL/L (ref 22–26)
HCO3 BLDA-SCNC: 30.6 MMOL/L (ref 22–26)
HCO3 BLDA-SCNC: 31.9 MMOL/L (ref 22–26)
HCT VFR BLD AUTO: 15.4 % (ref 34–46.6)
HCT VFR BLD AUTO: 26 % (ref 34–46.6)
HCT VFR BLD AUTO: 28.5 % (ref 34–46.6)
HCT VFR BLD AUTO: 34.5 % (ref 34–46.6)
HCT VFR BLDA CALC: 15 % (ref 38–51)
HCT VFR BLDA CALC: 18 % (ref 38–51)
HCT VFR BLDA CALC: 19 % (ref 38–51)
HCT VFR BLDA CALC: 20 % (ref 38–51)
HCT VFR BLDA CALC: 22 % (ref 38–51)
HCT VFR BLDA CALC: 24 % (ref 38–51)
HCT VFR BLDA CALC: 26 % (ref 38–51)
HCT VFR BLDA CALC: 27 % (ref 38–51)
HCT VFR BLDA CALC: 29 % (ref 38–51)
HCT VFR BLDA CALC: 29 % (ref 38–51)
HCT VFR BLDA CALC: 33 % (ref 38–51)
HEMODILUTION: NO
HEMODILUTION: NO
HGB BLD-MCNC: 11.8 G/DL (ref 12–15.9)
HGB BLD-MCNC: 4.9 G/DL (ref 12–15.9)
HGB BLD-MCNC: 8.6 G/DL (ref 12–15.9)
HGB BLD-MCNC: 8.9 G/DL (ref 12–15.9)
HGB BLDA-MCNC: 11.2 G/DL (ref 12–17)
HGB BLDA-MCNC: 5.1 G/DL (ref 12–17)
HGB BLDA-MCNC: 6.1 G/DL (ref 12–17)
HGB BLDA-MCNC: 6.5 G/DL (ref 12–17)
HGB BLDA-MCNC: 6.8 G/DL (ref 12–17)
HGB BLDA-MCNC: 7.5 G/DL (ref 12–17)
HGB BLDA-MCNC: 8.2 G/DL (ref 12–17)
HGB BLDA-MCNC: 8.8 G/DL (ref 12–17)
HGB BLDA-MCNC: 9.2 G/DL (ref 12–17)
HGB BLDA-MCNC: 9.9 G/DL (ref 12–17)
HGB BLDA-MCNC: 9.9 G/DL (ref 12–17)
HYPOCHROMIA BLD QL: ABNORMAL
INHALED O2 CONCENTRATION: 25 %
INHALED O2 CONCENTRATION: 30 %
INR PPP: 1.5 (ref 0.9–1.1)
INR PPP: 1.69 (ref 0.9–1.1)
INR PPP: 2 (ref 0.8–1.2)
LYMPHOCYTES # BLD AUTO: 0.41 10*3/MM3 (ref 0.7–3.1)
LYMPHOCYTES # BLD MANUAL: 0.15 10*3/MM3 (ref 0.7–3.1)
LYMPHOCYTES NFR BLD AUTO: 4.7 % (ref 19.6–45.3)
LYMPHOCYTES NFR BLD MANUAL: 6 % (ref 5–12)
MCH RBC QN AUTO: 27.2 PG (ref 26.6–33)
MCH RBC QN AUTO: 27.5 PG (ref 26.6–33)
MCH RBC QN AUTO: 28.9 PG (ref 26.6–33)
MCH RBC QN AUTO: 29.9 PG (ref 26.6–33)
MCHC RBC AUTO-ENTMCNC: 31.2 G/DL (ref 31.5–35.7)
MCHC RBC AUTO-ENTMCNC: 31.8 G/DL (ref 31.5–35.7)
MCHC RBC AUTO-ENTMCNC: 33.1 G/DL (ref 31.5–35.7)
MCHC RBC AUTO-ENTMCNC: 34.2 G/DL (ref 31.5–35.7)
MCV RBC AUTO: 86.5 FL (ref 79–97)
MCV RBC AUTO: 87.2 FL (ref 79–97)
MCV RBC AUTO: 87.2 FL (ref 79–97)
MCV RBC AUTO: 87.3 FL (ref 79–97)
MODALITY: ABNORMAL
MODALITY: ABNORMAL
MONOCYTES # BLD AUTO: 0.77 10*3/MM3 (ref 0.1–0.9)
MONOCYTES # BLD: 0.46 10*3/MM3 (ref 0.1–0.9)
MONOCYTES NFR BLD AUTO: 8.8 % (ref 5–12)
NEUTROPHILS # BLD AUTO: 7.12 10*3/MM3 (ref 1.7–7)
NEUTROPHILS NFR BLD AUTO: 7.39 10*3/MM3 (ref 1.7–7)
NEUTROPHILS NFR BLD AUTO: 84.7 % (ref 42.7–76)
NEUTROPHILS NFR BLD MANUAL: 92 % (ref 42.7–76)
O2 A-A PPRESDIFF RESPIRATORY: 0.7 MMHG
O2 A-A PPRESDIFF RESPIRATORY: 0.8 MMHG
OVALOCYTES BLD QL SMEAR: ABNORMAL
PCO2 BLDA: 20.2 MM HG (ref 35–45)
PCO2 BLDA: 27.6 MM HG (ref 35–45)
PCO2 BLDA: 29.6 MM HG (ref 35–45)
PCO2 BLDA: 29.7 MM HG (ref 35–45)
PCO2 BLDA: 30.8 MM HG (ref 35–45)
PCO2 BLDA: 32.3 MM HG (ref 35–45)
PCO2 BLDA: 32.3 MM HG (ref 35–45)
PCO2 BLDA: 36.6 MM HG (ref 35–45)
PCO2 BLDA: 41.9 MM HG (ref 35–45)
PCO2 BLDA: 46.9 MM HG (ref 35–45)
PCO2 BLDA: 49 MM HG (ref 35–45)
PCO2 BLDA: 49.8 MM HG (ref 35–45)
PCO2 BLDA: 52.2 MM HG (ref 35–45)
PEEP RESPIRATORY: 5 CM[H2O]
PEEP RESPIRATORY: 5 CM[H2O]
PH BLDA: 7.33 PH UNITS (ref 7.35–7.6)
PH BLDA: 7.36 PH UNITS (ref 7.35–7.6)
PH BLDA: 7.39 PH UNITS (ref 7.35–7.6)
PH BLDA: 7.41 PH UNITS (ref 7.35–7.6)
PH BLDA: 7.41 PH UNITS (ref 7.35–7.6)
PH BLDA: 7.42 PH UNITS (ref 7.35–7.6)
PH BLDA: 7.44 PH UNITS (ref 7.35–7.45)
PH BLDA: 7.44 PH UNITS (ref 7.35–7.6)
PH BLDA: 7.45 PH UNITS (ref 7.35–7.45)
PH BLDA: 7.47 PH UNITS (ref 7.35–7.6)
PH BLDA: 7.48 PH UNITS (ref 7.35–7.6)
PH BLDA: 7.5 PH UNITS (ref 7.35–7.6)
PH BLDA: 7.59 PH UNITS (ref 7.35–7.6)
PHOSPHATE SERPL-MCNC: 5 MG/DL (ref 2.5–4.5)
PHOSPHATE SERPL-MCNC: 5.6 MG/DL (ref 2.5–4.5)
PLAT MORPH BLD: NORMAL
PLATELET # BLD AUTO: 110 10*3/MM3 (ref 140–450)
PLATELET # BLD AUTO: 123 10*3/MM3 (ref 140–450)
PLATELET # BLD AUTO: 92 10*3/MM3 (ref 140–450)
PLATELET # BLD AUTO: 94 10*3/MM3 (ref 140–450)
PMV BLD AUTO: 10.8 FL (ref 6–12)
PMV BLD AUTO: 10.8 FL (ref 6–12)
PO2 BLDA: 110.1 MM HG (ref 80–100)
PO2 BLDA: 147.5 MM HG (ref 80–100)
PO2 BLDA: 278 MMHG (ref 80–105)
PO2 BLDA: 345 MMHG (ref 80–105)
PO2 BLDA: 376 MMHG (ref 80–105)
PO2 BLDA: 422 MMHG (ref 80–105)
PO2 BLDA: 46 MMHG (ref 80–105)
PO2 BLDA: 501 MMHG (ref 80–105)
PO2 BLDA: 507 MMHG (ref 80–105)
PO2 BLDA: 519 MMHG (ref 80–105)
PO2 BLDA: 558 MMHG (ref 80–105)
PO2 BLDA: 574 MMHG (ref 80–105)
PO2 BLDA: 574 MMHG (ref 80–105)
POIKILOCYTOSIS BLD QL SMEAR: ABNORMAL
POTASSIUM BLDA-SCNC: 3.2 MMOL/L (ref 3.5–4.9)
POTASSIUM BLDA-SCNC: 3.3 MMOL/L (ref 3.5–4.9)
POTASSIUM BLDA-SCNC: 3.4 MMOL/L (ref 3.5–4.9)
POTASSIUM BLDA-SCNC: 3.5 MMOL/L (ref 3.5–4.9)
POTASSIUM BLDA-SCNC: 3.6 MMOL/L (ref 3.5–4.9)
POTASSIUM BLDA-SCNC: 3.7 MMOL/L (ref 3.5–4.9)
POTASSIUM BLDA-SCNC: 3.8 MMOL/L (ref 3.5–4.9)
POTASSIUM BLDA-SCNC: 3.9 MMOL/L (ref 3.5–4.9)
POTASSIUM BLDA-SCNC: 4 MMOL/L (ref 3.5–4.9)
POTASSIUM BLDA-SCNC: 4 MMOL/L (ref 3.5–4.9)
POTASSIUM BLDA-SCNC: 4.6 MMOL/L (ref 3.5–4.9)
POTASSIUM SERPL-SCNC: 4 MMOL/L (ref 3.5–5.2)
POTASSIUM SERPL-SCNC: 4.2 MMOL/L (ref 3.5–5.2)
POTASSIUM SERPL-SCNC: 5.1 MMOL/L (ref 3.5–5.2)
POTASSIUM SERPL-SCNC: 5.2 MMOL/L (ref 3.5–5.2)
PROT SERPL-MCNC: 5.2 G/DL (ref 6–8.5)
PROTHROMBIN TIME: 18.4 SECONDS (ref 11.7–14.2)
PROTHROMBIN TIME: 20.1 SECONDS (ref 11.7–14.2)
PROTHROMBIN TIME: 23.6 SECONDS (ref 12.8–15.2)
PSV: 8 CMH2O
RBC # BLD AUTO: 1.78 10*6/MM3 (ref 3.77–5.28)
RBC # BLD AUTO: 2.98 10*6/MM3 (ref 3.77–5.28)
RBC # BLD AUTO: 3.27 10*6/MM3 (ref 3.77–5.28)
RBC # BLD AUTO: 3.95 10*6/MM3 (ref 3.77–5.28)
SAO2 % BLDA: 100 % (ref 95–98)
SAO2 % BLDA: 79 % (ref 95–98)
SAO2 % BLDCOA: 98.7 % (ref 92–98.5)
SAO2 % BLDCOA: 99.4 % (ref 92–98.5)
SET MECH RESP RATE: 20
SODIUM SERPL-SCNC: 135 MMOL/L (ref 136–145)
SODIUM SERPL-SCNC: 136 MMOL/L (ref 136–145)
SODIUM SERPL-SCNC: 136 MMOL/L (ref 136–145)
SODIUM SERPL-SCNC: 139 MMOL/L (ref 136–145)
TOTAL RATE: 36 BREATHS/MINUTE
TRIGL SERPL-MCNC: 202 MG/DL (ref 0–150)
UNIT  ABO: NORMAL
UNIT  RH: NORMAL
VARIANT LYMPHS NFR BLD MANUAL: 2 % (ref 19.6–45.3)
VENTILATOR MODE: ABNORMAL
VENTILATOR MODE: ABNORMAL
VT ON VENT VENT: 400 ML
WBC MORPH BLD: NORMAL
WBC NRBC COR # BLD: 12.3 10*3/MM3 (ref 3.4–10.8)
WBC NRBC COR # BLD: 13.01 10*3/MM3 (ref 3.4–10.8)
WBC NRBC COR # BLD: 7.86 10*3/MM3 (ref 3.4–10.8)
WBC NRBC COR # BLD: 8.73 10*3/MM3 (ref 3.4–10.8)

## 2023-11-06 PROCEDURE — 25810000003 SODIUM CHLORIDE 0.9 % SOLUTION 250 ML FLEX CONT

## 2023-11-06 PROCEDURE — 94640 AIRWAY INHALATION TREATMENT: CPT

## 2023-11-06 PROCEDURE — 25010000002 VANCOMYCIN PER 500 MG

## 2023-11-06 PROCEDURE — 25010000002 FENTANYL CITRATE (PF) 250 MCG/5ML SOLUTION: Performed by: ANESTHESIOLOGY

## 2023-11-06 PROCEDURE — 25010000002 CALCIUM GLUCONATE 2-0.675 GM/100ML-% SOLUTION: Performed by: NURSE PRACTITIONER

## 2023-11-06 PROCEDURE — 93318 ECHO TRANSESOPHAGEAL INTRAOP: CPT

## 2023-11-06 PROCEDURE — 82948 REAGENT STRIP/BLOOD GLUCOSE: CPT

## 2023-11-06 PROCEDURE — 85025 COMPLETE CBC W/AUTO DIFF WBC: CPT

## 2023-11-06 PROCEDURE — 94003 VENT MGMT INPAT SUBQ DAY: CPT

## 2023-11-06 PROCEDURE — 25010000002 HYDRALAZINE PER 20 MG: Performed by: THORACIC SURGERY (CARDIOTHORACIC VASCULAR SURGERY)

## 2023-11-06 PROCEDURE — 99232 SBSQ HOSP IP/OBS MODERATE 35: CPT | Performed by: STUDENT IN AN ORGANIZED HEALTH CARE EDUCATION/TRAINING PROGRAM

## 2023-11-06 PROCEDURE — 25010000002 LEVETRIRACETAM PER 10 MG

## 2023-11-06 PROCEDURE — 71045 X-RAY EXAM CHEST 1 VIEW: CPT

## 2023-11-06 PROCEDURE — 35820 EXPLORE CHEST VESSELS: CPT

## 2023-11-06 PROCEDURE — 94799 UNLISTED PULMONARY SVC/PX: CPT

## 2023-11-06 PROCEDURE — 25010000002 PROPOFOL 10 MG/ML EMULSION: Performed by: ANESTHESIOLOGY

## 2023-11-06 PROCEDURE — 99024 POSTOP FOLLOW-UP VISIT: CPT | Performed by: THORACIC SURGERY (CARDIOTHORACIC VASCULAR SURGERY)

## 2023-11-06 PROCEDURE — 25010000002 LACOSAMIDE 200 MG/20ML SOLUTION

## 2023-11-06 PROCEDURE — 25010000002 VANCOMYCIN 1 G RECONSTITUTED SOLUTION: Performed by: THORACIC SURGERY (CARDIOTHORACIC VASCULAR SURGERY)

## 2023-11-06 PROCEDURE — 85007 BL SMEAR W/DIFF WBC COUNT: CPT

## 2023-11-06 PROCEDURE — 85018 HEMOGLOBIN: CPT

## 2023-11-06 PROCEDURE — 25010000002 VANCOMYCIN 1 G RECONSTITUTED SOLUTION

## 2023-11-06 PROCEDURE — 85027 COMPLETE CBC AUTOMATED: CPT | Performed by: NURSE PRACTITIONER

## 2023-11-06 PROCEDURE — 93308 TTE F-UP OR LMTD: CPT

## 2023-11-06 PROCEDURE — 25010000002 LEVETRIRACETAM PER 10 MG: Performed by: STUDENT IN AN ORGANIZED HEALTH CARE EDUCATION/TRAINING PROGRAM

## 2023-11-06 PROCEDURE — 85027 COMPLETE CBC AUTOMATED: CPT | Performed by: THORACIC SURGERY (CARDIOTHORACIC VASCULAR SURGERY)

## 2023-11-06 PROCEDURE — 25010000002 ALBUMIN HUMAN 5% PER 50 ML

## 2023-11-06 PROCEDURE — 25810000003 SODIUM CHLORIDE 0.9 % SOLUTION

## 2023-11-06 PROCEDURE — P9035 PLATELET PHERES LEUKOREDUCED: HCPCS

## 2023-11-06 PROCEDURE — 82803 BLOOD GASES ANY COMBINATION: CPT

## 2023-11-06 PROCEDURE — 85384 FIBRINOGEN ACTIVITY: CPT | Performed by: THORACIC SURGERY (CARDIOTHORACIC VASCULAR SURGERY)

## 2023-11-06 PROCEDURE — 25810000003 SODIUM CHLORIDE 0.9 % SOLUTION 250 ML FLEX CONT: Performed by: ANESTHESIOLOGY

## 2023-11-06 PROCEDURE — 93321 DOPPLER ECHO F-UP/LMTD STD: CPT | Performed by: INTERNAL MEDICINE

## 2023-11-06 PROCEDURE — 93308 TTE F-UP OR LMTD: CPT | Performed by: INTERNAL MEDICINE

## 2023-11-06 PROCEDURE — 25010000002 LACOSAMIDE 200 MG/20ML SOLUTION: Performed by: STUDENT IN AN ORGANIZED HEALTH CARE EDUCATION/TRAINING PROGRAM

## 2023-11-06 PROCEDURE — C9254 INJECTION, LACOSAMIDE: HCPCS | Performed by: STUDENT IN AN ORGANIZED HEALTH CARE EDUCATION/TRAINING PROGRAM

## 2023-11-06 PROCEDURE — 84100 ASSAY OF PHOSPHORUS: CPT | Performed by: NURSE PRACTITIONER

## 2023-11-06 PROCEDURE — 36430 TRANSFUSION BLD/BLD COMPNT: CPT

## 2023-11-06 PROCEDURE — C9254 INJECTION, LACOSAMIDE: HCPCS

## 2023-11-06 PROCEDURE — 85730 THROMBOPLASTIN TIME PARTIAL: CPT

## 2023-11-06 PROCEDURE — 99232 SBSQ HOSP IP/OBS MODERATE 35: CPT | Performed by: INTERNAL MEDICINE

## 2023-11-06 PROCEDURE — 74018 RADEX ABDOMEN 1 VIEW: CPT

## 2023-11-06 PROCEDURE — 82330 ASSAY OF CALCIUM: CPT | Performed by: NURSE PRACTITIONER

## 2023-11-06 PROCEDURE — 86900 BLOOD TYPING SEROLOGIC ABO: CPT

## 2023-11-06 PROCEDURE — 25010000002 CLEVIDIPINE BUTYRATE PER 1 MG: Performed by: NURSE PRACTITIONER

## 2023-11-06 PROCEDURE — 85347 COAGULATION TIME ACTIVATED: CPT

## 2023-11-06 PROCEDURE — 85610 PROTHROMBIN TIME: CPT

## 2023-11-06 PROCEDURE — 85730 THROMBOPLASTIN TIME PARTIAL: CPT | Performed by: THORACIC SURGERY (CARDIOTHORACIC VASCULAR SURGERY)

## 2023-11-06 PROCEDURE — 84478 ASSAY OF TRIGLYCERIDES: CPT | Performed by: NURSE PRACTITIONER

## 2023-11-06 PROCEDURE — 02QX0ZZ REPAIR THORACIC AORTA, ASCENDING/ARCH, OPEN APPROACH: ICD-10-PCS

## 2023-11-06 PROCEDURE — 25010000002 MIDAZOLAM PER 1 MG: Performed by: ANESTHESIOLOGY

## 2023-11-06 PROCEDURE — 93321 DOPPLER ECHO F-UP/LMTD STD: CPT

## 2023-11-06 PROCEDURE — 85014 HEMATOCRIT: CPT

## 2023-11-06 PROCEDURE — C9248 INJ, CLEVIDIPINE BUTYRATE: HCPCS | Performed by: NURSE PRACTITIONER

## 2023-11-06 PROCEDURE — 80053 COMPREHEN METABOLIC PANEL: CPT | Performed by: NURSE PRACTITIONER

## 2023-11-06 PROCEDURE — 25010000002 HEPARIN (PORCINE) PER 1000 UNITS

## 2023-11-06 PROCEDURE — P9100 PATHOGEN TEST FOR PLATELETS: HCPCS

## 2023-11-06 PROCEDURE — P9041 ALBUMIN (HUMAN),5%, 50ML: HCPCS

## 2023-11-06 PROCEDURE — P9016 RBC LEUKOCYTES REDUCED: HCPCS

## 2023-11-06 PROCEDURE — 25010000002 VANCOMYCIN 1 G RECONSTITUTED SOLUTION 1 EACH VIAL: Performed by: THORACIC SURGERY (CARDIOTHORACIC VASCULAR SURGERY)

## 2023-11-06 PROCEDURE — 94761 N-INVAS EAR/PLS OXIMETRY MLT: CPT

## 2023-11-06 PROCEDURE — 25810000003 SODIUM CHLORIDE 0.9 % SOLUTION: Performed by: ANESTHESIOLOGY

## 2023-11-06 PROCEDURE — 85610 PROTHROMBIN TIME: CPT | Performed by: THORACIC SURGERY (CARDIOTHORACIC VASCULAR SURGERY)

## 2023-11-06 PROCEDURE — A4648 IMPLANTABLE TISSUE MARKER: HCPCS | Performed by: THORACIC SURGERY (CARDIOTHORACIC VASCULAR SURGERY)

## 2023-11-06 PROCEDURE — 82947 ASSAY GLUCOSE BLOOD QUANT: CPT

## 2023-11-06 PROCEDURE — 82330 ASSAY OF CALCIUM: CPT

## 2023-11-06 DEVICE — ABSORBABLE HEMOSTAT (OXIDIZED REGENERATED CELLULOSE)
Type: IMPLANTABLE DEVICE | Site: HEART | Status: FUNCTIONAL
Brand: SURGICEL

## 2023-11-06 RX ORDER — ROCURONIUM BROMIDE 10 MG/ML
INJECTION, SOLUTION INTRAVENOUS AS NEEDED
Status: DISCONTINUED | OUTPATIENT
Start: 2023-11-06 | End: 2023-11-06 | Stop reason: SURG

## 2023-11-06 RX ORDER — IPRATROPIUM BROMIDE AND ALBUTEROL SULFATE 2.5; .5 MG/3ML; MG/3ML
3 SOLUTION RESPIRATORY (INHALATION) EVERY 4 HOURS PRN
Status: DISCONTINUED | OUTPATIENT
Start: 2023-11-06 | End: 2023-12-09 | Stop reason: HOSPADM

## 2023-11-06 RX ORDER — VANCOMYCIN HYDROCHLORIDE 1 G/200ML
1000 INJECTION, SOLUTION INTRAVENOUS
Status: COMPLETED | OUTPATIENT
Start: 2023-11-07 | End: 2023-11-06

## 2023-11-06 RX ORDER — CARVEDILOL 25 MG/1
25 TABLET ORAL EVERY 12 HOURS
Status: DISCONTINUED | OUTPATIENT
Start: 2023-11-06 | End: 2023-11-06

## 2023-11-06 RX ORDER — CARVEDILOL 12.5 MG/1
12.5 TABLET ORAL EVERY 12 HOURS SCHEDULED
Status: DISCONTINUED | OUTPATIENT
Start: 2023-11-06 | End: 2023-11-08

## 2023-11-06 RX ORDER — SODIUM CHLORIDE 9 MG/ML
INJECTION, SOLUTION INTRAVENOUS CONTINUOUS PRN
Status: DISCONTINUED | OUTPATIENT
Start: 2023-11-06 | End: 2023-11-06 | Stop reason: SURG

## 2023-11-06 RX ORDER — CARVEDILOL 12.5 MG/1
12.5 TABLET ORAL EVERY 12 HOURS
Status: DISCONTINUED | OUTPATIENT
Start: 2023-11-06 | End: 2023-11-06

## 2023-11-06 RX ORDER — HYDRALAZINE HYDROCHLORIDE 25 MG/1
25 TABLET, FILM COATED ORAL EVERY 8 HOURS SCHEDULED
Status: DISCONTINUED | OUTPATIENT
Start: 2023-11-06 | End: 2023-11-06

## 2023-11-06 RX ORDER — CALCIUM GLUCONATE 20 MG/ML
2000 INJECTION, SOLUTION INTRAVENOUS ONCE
Status: COMPLETED | OUTPATIENT
Start: 2023-11-06 | End: 2023-11-06

## 2023-11-06 RX ORDER — PANTOPRAZOLE SODIUM 40 MG/10ML
40 INJECTION, POWDER, LYOPHILIZED, FOR SOLUTION INTRAVENOUS ONCE
Status: COMPLETED | OUTPATIENT
Start: 2023-11-06 | End: 2023-11-06

## 2023-11-06 RX ORDER — ALBUMIN, HUMAN INJ 5% 5 %
SOLUTION INTRAVENOUS
Status: COMPLETED
Start: 2023-11-06 | End: 2023-11-06

## 2023-11-06 RX ORDER — MIDAZOLAM HYDROCHLORIDE 1 MG/ML
INJECTION INTRAMUSCULAR; INTRAVENOUS AS NEEDED
Status: DISCONTINUED | OUTPATIENT
Start: 2023-11-06 | End: 2023-11-06 | Stop reason: SURG

## 2023-11-06 RX ORDER — VANCOMYCIN HYDROCHLORIDE 1 G/20ML
INJECTION, POWDER, LYOPHILIZED, FOR SOLUTION INTRAVENOUS AS NEEDED
Status: DISCONTINUED | OUTPATIENT
Start: 2023-11-06 | End: 2023-11-06 | Stop reason: HOSPADM

## 2023-11-06 RX ORDER — FENTANYL CITRATE 50 UG/ML
INJECTION, SOLUTION INTRAMUSCULAR; INTRAVENOUS AS NEEDED
Status: DISCONTINUED | OUTPATIENT
Start: 2023-11-06 | End: 2023-11-06 | Stop reason: SURG

## 2023-11-06 RX ORDER — NOREPINEPHRINE BITARTRATE 0.03 MG/ML
.02-.3 INJECTION, SOLUTION INTRAVENOUS
Status: DISCONTINUED | OUTPATIENT
Start: 2023-11-06 | End: 2023-11-09

## 2023-11-06 RX ORDER — SODIUM CHLORIDE FOR INHALATION 7 %
4 VIAL, NEBULIZER (ML) INHALATION
Status: DISCONTINUED | OUTPATIENT
Start: 2023-11-06 | End: 2023-11-13

## 2023-11-06 RX ADMIN — MUPIROCIN 1 APPLICATION: 20 OINTMENT TOPICAL at 21:42

## 2023-11-06 RX ADMIN — VANCOMYCIN HYDROCHLORIDE 1000 MG: 1 INJECTION, SOLUTION INTRAVENOUS at 15:25

## 2023-11-06 RX ADMIN — DEXMEDETOMIDINE HYDROCHLORIDE 0.3 MCG/KG/HR: 4 INJECTION, SOLUTION INTRAVENOUS at 21:42

## 2023-11-06 RX ADMIN — HYDROCODONE BITARTRATE AND ACETAMINOPHEN 2 TABLET: 5; 325 TABLET ORAL at 07:51

## 2023-11-06 RX ADMIN — PROPOFOL 50 MCG/KG/MIN: 10 INJECTION, EMULSION INTRAVENOUS at 15:48

## 2023-11-06 RX ADMIN — CLEVIPIDINE 26 MG/HR: 0.5 EMULSION INTRAVENOUS at 08:05

## 2023-11-06 RX ADMIN — HYDRALAZINE HYDROCHLORIDE 20 MG: 20 INJECTION INTRAMUSCULAR; INTRAVENOUS at 00:39

## 2023-11-06 RX ADMIN — AMLODIPINE BESYLATE 5 MG: 5 TABLET ORAL at 07:40

## 2023-11-06 RX ADMIN — PANTOPRAZOLE SODIUM 40 MG: 40 INJECTION, POWDER, FOR SOLUTION INTRAVENOUS at 05:04

## 2023-11-06 RX ADMIN — CARVEDILOL 12.5 MG: 12.5 TABLET, FILM COATED ORAL at 21:42

## 2023-11-06 RX ADMIN — 0.12% CHLORHEXIDINE GLUCONATE 15 ML: 1.2 RINSE ORAL at 08:37

## 2023-11-06 RX ADMIN — ATORVASTATIN CALCIUM 40 MG: 20 TABLET, FILM COATED ORAL at 21:41

## 2023-11-06 RX ADMIN — LACOSAMIDE 50 MG: 10 INJECTION INTRAVENOUS at 12:32

## 2023-11-06 RX ADMIN — HYDRALAZINE HYDROCHLORIDE 20 MG: 20 INJECTION INTRAMUSCULAR; INTRAVENOUS at 04:11

## 2023-11-06 RX ADMIN — CLEVIPIDINE 26 MG/HR: 0.5 EMULSION INTRAVENOUS at 05:25

## 2023-11-06 RX ADMIN — CLEVIPIDINE 20 MG/HR: 0.5 EMULSION INTRAVENOUS at 00:36

## 2023-11-06 RX ADMIN — ALBUMIN (HUMAN): 12.5 INJECTION, SOLUTION INTRAVENOUS at 13:30

## 2023-11-06 RX ADMIN — CLEVIPIDINE 26 MG/HR: 0.5 EMULSION INTRAVENOUS at 06:29

## 2023-11-06 RX ADMIN — NICARDIPINE HYDROCHLORIDE 7.5 MG/HR: 25 INJECTION, SOLUTION INTRAVENOUS at 18:10

## 2023-11-06 RX ADMIN — ALBUMIN (HUMAN): 12.5 INJECTION, SOLUTION INTRAVENOUS at 13:45

## 2023-11-06 RX ADMIN — CALCIUM GLUCONATE 2000 MG: 20 INJECTION, SOLUTION INTRAVENOUS at 09:58

## 2023-11-06 RX ADMIN — IPRATROPIUM BROMIDE AND ALBUTEROL SULFATE 3 ML: 2.5; .5 SOLUTION RESPIRATORY (INHALATION) at 19:18

## 2023-11-06 RX ADMIN — FENTANYL CITRATE 100 MCG: 50 INJECTION, SOLUTION INTRAMUSCULAR; INTRAVENOUS at 16:09

## 2023-11-06 RX ADMIN — Medication 0.02 MCG/KG/MIN: at 12:48

## 2023-11-06 RX ADMIN — Medication 4 ML: at 10:36

## 2023-11-06 RX ADMIN — ACETAMINOPHEN 650 MG: 160 SOLUTION ORAL at 21:42

## 2023-11-06 RX ADMIN — SODIUM BICARBONATE 50 MEQ: 84 INJECTION INTRAVENOUS at 14:00

## 2023-11-06 RX ADMIN — NICARDIPINE HYDROCHLORIDE 2.5 MG/HR: 25 INJECTION, SOLUTION INTRAVENOUS at 16:08

## 2023-11-06 RX ADMIN — LEVETIRACETAM 500 MG: 500 INJECTION, SOLUTION INTRAVENOUS at 21:46

## 2023-11-06 RX ADMIN — SODIUM CHLORIDE: 9 INJECTION, SOLUTION INTRAVENOUS at 15:43

## 2023-11-06 RX ADMIN — CLEVIPIDINE 26 MG/HR: 0.5 EMULSION INTRAVENOUS at 04:25

## 2023-11-06 RX ADMIN — ALPRAZOLAM 0.25 MG: 0.25 TABLET ORAL at 09:50

## 2023-11-06 RX ADMIN — Medication 4 ML: at 19:19

## 2023-11-06 RX ADMIN — CLEVIPIDINE 26 MG/HR: 0.5 EMULSION INTRAVENOUS at 01:35

## 2023-11-06 RX ADMIN — MUPIROCIN 1 APPLICATION: 20 OINTMENT TOPICAL at 08:37

## 2023-11-06 RX ADMIN — 0.12% CHLORHEXIDINE GLUCONATE 15 ML: 1.2 RINSE ORAL at 21:42

## 2023-11-06 RX ADMIN — MIDAZOLAM 2 MG: 1 INJECTION INTRAMUSCULAR; INTRAVENOUS at 15:46

## 2023-11-06 RX ADMIN — SODIUM CHLORIDE: 9 INJECTION, SOLUTION INTRAVENOUS at 15:53

## 2023-11-06 RX ADMIN — ROCURONIUM BROMIDE 50 MG: 10 INJECTION, SOLUTION INTRAVENOUS at 15:47

## 2023-11-06 RX ADMIN — IPRATROPIUM BROMIDE AND ALBUTEROL SULFATE 3 ML: 2.5; .5 SOLUTION RESPIRATORY (INHALATION) at 10:25

## 2023-11-06 RX ADMIN — SODIUM BICARBONATE 50 MEQ: 84 INJECTION INTRAVENOUS at 14:13

## 2023-11-06 RX ADMIN — CLEVIPIDINE 26 MG/HR: 0.5 EMULSION INTRAVENOUS at 03:26

## 2023-11-06 RX ADMIN — CARVEDILOL 12.5 MG: 6.25 TABLET, FILM COATED ORAL at 07:41

## 2023-11-06 RX ADMIN — LACOSAMIDE 50 MG: 10 INJECTION INTRAVENOUS at 23:44

## 2023-11-06 RX ADMIN — SENNOSIDES AND DOCUSATE SODIUM 2 TABLET: 50; 8.6 TABLET ORAL at 21:42

## 2023-11-06 RX ADMIN — SODIUM CHLORIDE 0.5 MCG/KG/HR: 9 INJECTION, SOLUTION INTRAVENOUS at 15:46

## 2023-11-06 RX ADMIN — LEVETIRACETAM 500 MG: 500 INJECTION, SOLUTION INTRAVENOUS at 08:37

## 2023-11-06 RX ADMIN — FENTANYL CITRATE 150 MCG: 50 INJECTION, SOLUTION INTRAMUSCULAR; INTRAVENOUS at 16:03

## 2023-11-06 NOTE — ANESTHESIA PREPROCEDURE EVALUATION
Anesthesia Evaluation     no history of anesthetic complications:   NPO Solid Status: Waived due to emergency  NPO Liquid Status: Waived due to emergency           Airway   Comment: Intubated on minimal vent settings  Dental - normal exam     Pulmonary - normal exam   Cardiovascular     (+) hypertension, CHF , pericardial effusion (large clot compressing RV requiring norepi at 0.04 mcg/kg/min)      Neuro/Psych  (+) seizures, headaches  GI/Hepatic/Renal/Endo    (+) liver disease, renal disease (no urine last HD yesterday)- ESRD and dialysis    Musculoskeletal     Abdominal    Substance History      OB/GYN          Other                    Anesthesia Plan    ASA 4 - emergent     general     intravenous induction   Postoperative Plan: Expected vent after surgery  Anesthetic plan, risks, benefits, and alternatives have been provided, discussed and informed consent has been obtained with: patient.    CODE STATUS:    Code Status (Patient has no pulse and is not breathing): CPR (Attempt to Resuscitate)  Medical Interventions (Patient has pulse or is breathing): Full Support

## 2023-11-06 NOTE — PROGRESS NOTES
Patient SBT failed at this time.    VT      290 ml  MV     9.2  RR     30  VC     410 ml  RSBI  103  NIF      -28    Patient RR increasing to 38 at this time.    Per Dr. Hay, attempt SBT again today in afternoon.

## 2023-11-06 NOTE — CONSULTS
"Nutrition Services    Patient Name:  Dee Herrera  YOB: 1990  MRN: [id number]  Admit Date:  10/26/2023    Assessment Date:  11/06/23    Summary: TF assessment/cortrak placement    S/p aortic dissectin repair and proximal aortic replacement 11/2.  Remains on vent since surgery.  Seizures.  More awake and alert today, following commands.  Failed multiple SBTs today.    This afternoon patient had a sudden drop in her blood pressure.  MD thinks there is a clot compressing the RV.  Plans for OR this afternoon for clot removal.    Plans for cortrak placement in the am if patient is stable enough for TFs to begin.    RD to follow closely.    CLINICAL NUTRITION ASSESSMENT      Reason for Assessment Cortrak Placement, Tube Feeding Assessment      Diagnosis/Problem SOA, hyponatremia, ESRD on dialysis, abd pain     Anthropometrics        Current Height  Current Weight  BMI kg/m2 Height: 165 cm (64.96\")  Weight: 55 kg (121 lb 4.1 oz) (11/06/23 1340)  Body mass index is 20.2 kg/m².   Adjusted BMI (if applicable)    BMI Category Normal/Healthy (18.4 - 24.9)   Ideal Body Weight (IBW) 125lb   Usual Body Weight (UBW) 120-155   Weight Trend Loss, 5lbs recently, 35lb overall   --  Estimated/Assessed Needs        Current Weight  Weight: 55 kg (121 lb 4.1 oz) (11/06/23 1340)       Energy Requirements    Weight for Calculation 52.2 kg   Method for Estimation  30-35 kcal/kg   EST Needs (kcal/day) 3377-0085       Protein Requirements    Weight for Calculation 52.2 kg   EST Protein Needs (g/kg) 1.0 gm/kg, 1.5 gm/kg   EST Daily Needs (g/day) 52-78       Fluid Requirements     Method for Estimation 1 mL/kcal    EST Needs (mL/day)      Labs       Pertinent Labs    Results from last 7 days   Lab Units 11/06/23  1312 11/06/23  0300 11/05/23  0301 11/04/23  0345 11/03/23  0407   SODIUM mmol/L 135* 136 136   < > 140   POTASSIUM mmol/L 5.1 4.2 4.2   < > 4.5   CHLORIDE mmol/L 103 101 99   < > 99   CO2 mmol/L 15.0* 18.7* 18.4*   < " > 24.4   BUN mg/dL 26* 18 30*   < > 26*   CREATININE mg/dL 4.78* 3.96* 5.89*   < > 5.20*   CALCIUM mg/dL 10.1 8.9 9.2   < > 8.8   BILIRUBIN mg/dL  --   --   --   --  0.2   ALK PHOS U/L  --   --   --   --  65   ALT (SGPT) U/L  --   --   --   --  12   AST (SGOT) U/L  --   --   --   --  55*   GLUCOSE mg/dL 67 91 90   < > 126*    < > = values in this interval not displayed.     Results from last 7 days   Lab Units 11/06/23 1312 11/05/23 0301 11/04/23 0345 11/03/23 0407 11/02/23  2351 11/02/23  1736   MAGNESIUM mg/dL  --   --  2.1 2.6  --  2.4   PHOSPHORUS mg/dL 5.6*   < > 4.0 3.1   < > 2.7   HEMOGLOBIN g/dL 8.9*   < > 8.7* 9.3*  --  9.0*   HEMATOCRIT % 28.5*   < > 27.0* 28.5*  --  27.1*   WBC 10*3/mm3 13.01*   < > 10.11 6.95  --  5.07   TRIGLYCERIDES mg/dL 202*  --   --   --   --   --    ALBUMIN g/dL 2.9*  --  3.1* 3.4*   < > 3.5    < > = values in this interval not displayed.     Results from last 7 days   Lab Units 11/06/23  1312 11/06/23  0300 11/05/23  0301 11/04/23 0345 11/03/23  0407 11/02/23  1736 11/02/23  1412 11/02/23  1313 11/02/23  1251 11/02/23  1115 11/01/23  0557 10/31/23  0520   INR   --   --   --   --  1.20*  --  1.62* 1.4* 1.70* 2.21*  --  1.05   APTT seconds  --   --   --   --   --   --  33.7  --  33.6 34.3  --  29.1   PLATELETS 10*3/mm3 110* 94* 83* 125* 172   < > 162  --  117* 38*   < > 85*    < > = values in this interval not displayed.     COVID19   Date Value Ref Range Status   10/31/2023 Not Detected Not Detected - Ref. Range Final     Lab Results   Component Value Date    HGBA1C 5.10 10/30/2023          Medications           Scheduled Medications amLODIPine, 5 mg, Oral, Q24H  [Held by provider] aspirin, 81 mg, Oral, Daily  atorvastatin, 40 mg, Oral, Nightly  carvedilol, 12.5 mg, Oral, Q12H  chlorhexidine, 15 mL, Mouth/Throat, Q12H  [Held by provider] heparin (porcine), 5,000 Units, Subcutaneous, Q8H  Lacosamide, 50 mg, Intravenous, Q12H  levETIRAcetam, 500 mg, Intravenous, Q12H  mupirocin,  , Each Nare, BID  pantoprazole, 40 mg, Oral, QAM  senna-docusate sodium, 2 tablet, Oral, Nightly  sodium chloride, 4 mL, Nebulization, BID - RT  [START ON 11/7/2023] vancomycin, 1,000 mg, Intravenous, On Call to OR       Infusions dexmedetomidine, 0.2-1.5 mcg/kg/hr, Last Rate: Stopped (11/02/23 2000)  EPINEPHrine, 0.02-0.3 mcg/kg/min  insulin, 0-100 Units/hr  nitroglycerin, 5-200 mcg/min  norepinephrine, 0.02-0.3 mcg/kg/min, Last Rate: 0.09 mcg/kg/min (11/06/23 1310)  Pharmacy Consult - Pharmacy to dose,   propofol, 5-50 mcg/kg/min, Last Rate: Stopped (11/03/23 0430)  sodium chloride, 30 mL/hr, Last Rate: 30 mL/hr (11/02/23 1400)  vasopressin, 0.02-0.1 Units/min       PRN Medications   acetaminophen **OR** acetaminophen **OR** acetaminophen    ALPRAZolam    bisacodyl    bisacodyl    cyclobenzaprine    dextrose    dextrose    EPINEPHrine    glucagon (human recombinant)    heparin (porcine)    hydrALAZINE    HYDROcodone-acetaminophen    ipratropium-albuterol    LORazepam    Morphine **AND** naloxone    nitroglycerin    ondansetron    oxyCODONE    Pharmacy Consult - Pharmacy to dose    polyethylene glycol    Potassium Replacement - Follow Nurse / BPA Driven Protocol    propofol    vasopressin     Physical Findings          General Findings alert, oriented, ventilator support   Oral/Mouth Cavity other:tongue swelling, lesions   Edema  1+ (trace)   Gastrointestinal normoactive, last bowel movement: 11/1   Skin  surgical incision: sternal   Tubes/Drains/Lines none, chest tube, dialysis catheter, OG tube   NFPE See Malnutrition Severity Assessment   --  Malnutrition Severity Assessment      Patient meets criteria for : Moderate (non-severe) Malnutrition       Current Nutrition Orders & Evaluation of Intake       Oral Nutrition     Food Allergies NKFA   Current PO Diet NPO Diet NPO Type: Strict NPO   Supplement n/a   PO Evaluation     % PO Intake N/a    Factors Affecting Intake: altered respiratory status   --  PES  STATEMENT / NUTRITION DIAGNOSIS      Nutrition Dx Problem  Problem: Unintentional Weight Loss, Malnutrition (moderate), and Inadequate Oral Intake  Etiology: Factors Affecting Nutrition - decrease appetite, abd pain    Signs/Symptoms: PO intake, NFPE Results, Unintended Weight Change, and Report/Observation     NUTRITION INTERVENTION / PLAN OF CARE      Intervention Goal(s) Maintain nutrition status, Reduce/improve symptoms, Meet estimated needs, Disease management/therapy, Initiate TF/PN, and Maintain weight         RD Intervention/Action Await initiation of EN/PN, Continue to monitor, Care plan reviewed, and Recommend/order: EN   --      Prescription/Orders:       PO Diet       Supplements       Enteral Nutrition    Enteral Prescription:     Enteral Route tube placement pending    TF Delivery Method Continuous    Enteral Product Novasource Renal    Modular None    Propofol Rate/Kcal     TF Start Rate  10 mL/hr    TF Goal Rate  35 mL/hr    Free Water Flush 30 mL Q 4 hr    Provision at Goal:          Calories 1680 kcal, meets 100 % needs         Protein  76 gm protein, meets 100 % needs         Fluid (mL) 605 mL free water + 180 mL in flushes    Prescription Ordered No, recommended         Parenteral Nutrition    New Prescription Ordered? No, recommended   --      Monitor/Evaluation Per protocol, I&O, Pertinent labs, EN delivery/tolerance, Weight, GI status, Symptoms, Swallow function, Hemodynamic stability   Discharge Plan/Needs Pending clinical course   --    RD to follow per protocol.      Electronically signed by:  Shell Horn RD  11/06/23 14:28 EST

## 2023-11-06 NOTE — ANESTHESIA PROCEDURE NOTES
Diagnostic IntraOp [place]    Procedure Performed: Diagnostic IntraOp [place]       Start Time:        End Time:      Preanesthesia Checklist:  Patient identified, IV assessed, risks and benefits discussed, monitors and equipment assessed, procedure being performed at surgeon's request and anesthesia consent obtained.    General Procedure Information  Diagnostic Indications for Echo:  assessment of ascending aorta, assessment of surgical repair and hemodynamic monitoring  Physician Requesting Echo: Mario Lowe MD  CPT Code:  51391, 51241  Location performed:  OR  Intubated  Bite block placed  Heart visualized  Probe Insertion:  Easy  Probe Type:  Multiplane  Modalities:  Color flow mapping, pulse wave Doppler and continuous wave Doppler    Echocardiographic and Doppler Measurements    Ventricles    Right Ventricle:  Cavity size normal.  Hypertrophy present.  Thrombus not present.  Global function normal.    Left Ventricle:  Cavity size normal.  Thrombus not present.  Global Function normal.      Ventricular Regional Function:    Wall Motion Comments:       There is a large clot compressing on the RV     Valves    Aortic Valve:  Annulus bioprosthetic.  Stenosis not present.  Mean Gradient: 11 mmHg.  Regurgitation mild.  Leaflet motions normal.      Mitral Valve:  Annulus normal.  Stenosis not present.  Mean Gradient: 1 mmHg.  Regurgitation trace.  Leaflets normal.      Tricuspid Valve:  Annulus normal.  Stenosis not present.  Regurgitation absent.    Other Valve Findings:       AI jet originates between the commisure of the NCC and RCC    Aorta    Ascending Aorta:  Dissection not present.  Plaque thickness less than 3 mm.  Mobile plaque not present.    Aortic Arch:  Dissection not present.  Plaque thickness less than 3 mm.  Mobile plaque not present.    Descending Aorta:  Dissection not present.  Plaque thickness less than 3 mm.  Mobile plaque not present.        Atria    Right Atrium:  Size normal.  Spontaneous  echo contrast not present.  Thrombus not present.  Tumor not present.  Device not present.      Left Atrium:  Size normal.  Spontaneous echo contrast not present.  Thrombus not present.  Tumor not present.  Device not present.    Left atrial appendage normal.          Diastolic Function Measurements:  Diastolic Dysfunction Grade=  E=  70.6 ms  A=  42.6 ms  E/A Ratio=  1.7  DT=  ms  S/D=  .6  IVRT=    Other Findings  Pericardium:  normal  Pulmonary Venous Flow:  blunted (decreased) systolic flow    Anesthesia Information  Performed Personally  Anesthesiologist:  Jose Juan Monique MD

## 2023-11-06 NOTE — PROGRESS NOTES
"DOS: 2023  NAME: Dee Herrera   : 1990  PCP: Margarita Woods APRN  Chief Complaint   Patient presents with    Shortness of Breath       Chief complaint: subacute aortic dissection status post repair with valve reimplantation /Seizure like activity .     Subjective: Patient has been seen and evaluated this morning, no acute events overnight, more clinical seizures.  More awake alert and following commands    Objective:  Vital signs: /71   Pulse 97   Temp 99.6 °F (37.6 °C) (Oral)   Resp 26   Ht 165 cm (64.96\")   Wt 55.5 kg (122 lb 5.7 oz)   LMP 08/10/2022 (Approximate)   SpO2 100%   BMI 20.39 kg/m²    Gen: NAD, vitals reviewed  MS: AWake and alert, following commands on both sides  CN: Blink to threat present bilaterally, PERRL, EOMI, no facial droop, no dysarthria  Motor: At least 4+/5 throughout upper and lower extremities, normal tone  Sensory: intact to light touch all 4 ext.    Laboratory results:  Lab Results   Component Value Date    GLUCOSE 91 2023    CALCIUM 8.9 2023     2023    K 4.2 2023    CO2 18.7 (L) 2023     2023    BUN 18 2023    CREATININE 3.96 (H) 2023    EGFRIFAFRI 107 2021    BCR 4.5 (L) 2023    ANIONGAP 16.3 (H) 2023     Lab Results   Component Value Date    WBC 12.30 (H) 2023    HGB 11.8 (L) 2023    HCT 34.5 2023    MCV 87.3 2023    PLT 94 (L) 2023     Lab Results   Component Value Date    LDL 44 2022    LDL 25 2021            Review of labs:   Sodium 136  Creatinine 3.96    WBC 12.3  Hemoglobin 11.8 and platelets 94    Imaging review:   CT head done on 2023-no acute abnormality, good hypodensity or hyperdensity, mild cortical atrophy and small vessel disease.     CT head done on 11/3/2023- There is a thin acute subdural hematoma related to the posterior aspect of the falx and extending over the medial aspect of the tentorium cerebelli on " the right measuring approximately 2 mm in maximum  thickness. There is a low-attenuation hygroma appreciated overlying the  right frontal lobe laterally and superolaterally measuring approximately  3 mm in thickness, which is new versus 02/13/2023.      Repeat CT head 11/4/2023 stable subdural bleed     EEG  -Abnormal study during wakefulness and drowsiness with mild diffuse slowing of the background and intermittent bilateral posterior temporal focal slowing.  No clear-cut epileptiform activities were seen.,  Sharp transients seen in the right frontoparietal     Diagnoses:  Seizure-like activity     Dee Herrera is a 33 y.o. female past medical history of end-stage renal disease with peritoneal dialysis hypertension, chronic diastolic heart failure with pulmonary hypertension, malnutrition/poor appetite, thrombocytopenia, history of lupus on Plaquenil and CellCept at home, anemia of chronic diseases and also history of seizure disorder.     Patient got admitted for shortness of breath and was operated for ascending aortic dissection aneurysm repair, stop she developed some seizure-like activity and neurology was consulted for the same.     Patient was given Keppra 1 g load on Friday when she started having seizure-like spells, later on is on Keppra 500 mg twice daily along with 250 additional dose after dialysis.    She had 2 more clinical seizures following day so Vimpat 400 mg one-time load was given along with Vimpat 50 mg twice daily     Plan      -Continue Keppra 500 mg twice daily along with additional dose of 250 after dialysis, along with Vimpat 50 mg twice daily  -Okay to extubate when appropriate by ICU staff    Neurology will sign off, call us with any questions    She will follow-up in clinic in 2 to 3 months, and get an MRI brain without when medically stable from cardiac point, and in the clinic will try to wean off of Vimpat 50 mg twice daily based on how she is doing.          MDM   Reviewed:  Previous charts, nursing notes and vitals   Reviewed: Previous labs    Interpretation: Labs   Total time providing critical care is :30-74 minutes. This excluded time spent performing separately reportable procedures and services  Consults :Neurology/Stroke    [facility] speech recognition transcription software was used to create portions of this document. An attempt at proofreading has been made to minimize errors, which may be grammatical errors, nonsensical statements, inaccuracies and/or not pertinent to the context. Please call if you have any questions.     Preet Guillen MD  Neuro Hospitalist /Vascular Neurology.

## 2023-11-06 NOTE — PROGRESS NOTES
[facility] Follow Up    Chief Complaint: Follow up aortic aneurysm/dissection, severe AR    Interval History: Events over the weekend noted.  She remains intubated but is awake and is following commands.  Attempted spontaneous breathing trial earlier this morning but apparently appeared to be tachypneic.  Plan for repeat SBT later today.    Objective:     Objective:  Temp:  [98 °F (36.7 °C)-99.5 °F (37.5 °C)] 99 °F (37.2 °C)  Heart Rate:  [80-99] 93  Resp:  [21-29] 26  BP: (110-145)/() 117/77  FiO2 (%):  [30 %] 30 %     Intake/Output Summary (Last 24 hours) at 11/6/2023 0739  Last data filed at 11/6/2023 0525  Gross per 24 hour   Intake 1535.33 ml   Output 2000 ml   Net -464.67 ml     Body mass index is 20.39 kg/m².      11/02/23  0424 11/03/23  0409 11/04/23  0500   Weight: 44.4 kg (97 lb 14.2 oz) 54.1 kg (119 lb 4.3 oz) 55.5 kg (122 lb 5.7 oz)     Weight change:       Physical Exam:   General : Alert, intubated  Neuro: Alert  Lungs: CTAB. Normal respiratory effort and rate.  CV: Regular rate and rhythm, normal S1 and S2, no murmurs, gallops or rubs.  ABD: Soft, nontender, nondistended. Positive bowel sounds.  Extr: No edema or cyanosis, moves all extremities.    Lab Review:   Results from last 7 days   Lab Units 11/06/23  0300 11/05/23  0301 11/04/23  0345 11/03/23  0407   SODIUM mmol/L 136 136   < > 140   POTASSIUM mmol/L 4.2 4.2   < > 4.5   CHLORIDE mmol/L 101 99   < > 99   CO2 mmol/L 18.7* 18.4*   < > 24.4   BUN mg/dL 18 30*   < > 26*   CREATININE mg/dL 3.96* 5.89*   < > 5.20*   GLUCOSE mg/dL 91 90   < > 126*   CALCIUM mg/dL 8.9 9.2   < > 8.8   AST (SGOT) U/L  --   --   --  55*   ALT (SGPT) U/L  --   --   --  12    < > = values in this interval not displayed.         Results from last 7 days   Lab Units 11/06/23  0300 11/05/23  0301   WBC 10*3/mm3 12.30* 10.07   HEMOGLOBIN g/dL 11.8* 8.5*   HEMATOCRIT % 34.5 25.5*   PLATELETS 10*3/mm3 94* 83*     Results from last 7 days   Lab Units  "11/03/23  0407 11/02/23  1412 11/02/23  1313 11/02/23  1251   INR  1.20* 1.62*   < > 1.70*   APTT seconds  --  33.7  --  33.6    < > = values in this interval not displayed.     Results from last 7 days   Lab Units 11/04/23  0345 11/03/23  0407   MAGNESIUM mg/dL 2.1 2.6           Invalid input(s): \"LDLCALC\"  Results from last 7 days   Lab Units 10/30/23  1511   PROBNP pg/mL >70,000.0*         I reviewed the patient's new clinical results.  I personally viewed and interpreted the patient's EKG  Current Medications:   Scheduled Meds:amLODIPine, 5 mg, Oral, Q24H  [Held by provider] aspirin, 81 mg, Oral, Daily  atorvastatin, 40 mg, Oral, Nightly  carvedilol, 12.5 mg, Oral, Q12H  chlorhexidine, 15 mL, Mouth/Throat, Q12H  [Held by provider] heparin (porcine), 5,000 Units, Subcutaneous, Q8H  Lacosamide, 50 mg, Intravenous, Q12H  levETIRAcetam, 500 mg, Intravenous, Q12H  mupirocin, , Each Nare, BID  pantoprazole, 40 mg, Oral, QAM  senna-docusate sodium, 2 tablet, Oral, Nightly      Continuous Infusions:clevidipine, 2-32 mg/hr, Last Rate: 26 mg/hr (11/06/23 0629)  dexmedetomidine, 0.2-1.5 mcg/kg/hr, Last Rate: Stopped (11/02/23 2000)  DOPamine, 2-20 mcg/kg/min  EPINEPHrine, 0.02-0.1 mcg/kg/min  insulin, 0-100 Units/hr  milrinone, 0.25-0.375 mcg/kg/min  niCARdipine, 5-15 mg/hr  nitroglycerin, 5-200 mcg/min  norepinephrine, 0.02-0.2 mcg/kg/min, Last Rate: 0.02 mcg/kg/min (11/03/23 1337)  Pharmacy Consult - Pharmacy to dose,   phenylephrine, 0.2-2 mcg/kg/min  propofol, 5-50 mcg/kg/min, Last Rate: Stopped (11/03/23 0430)  sodium chloride, 30 mL/hr, Last Rate: 30 mL/hr (11/02/23 1400)        Allergies:  Allergies   Allergen Reactions    Minoxidil Other (See Comments) and Hives     Pericardial effusion .       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  Status post repair and proximal aortic replacement on 11/2   Severe aortic valve regurgitation.  Status post repair on " 11/2.  Seizures.  Started postoperatively.  Currently appears to be doing from this respect on medical therapy.    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  On peritoneal dialysis normally.  Has been on hemodialysis postoperatively.    Hyponatremia.  Resolved.  Hypertension.  Initially hypertensive following surgery.  On clevidipine back on oral antihypertensives.  Chronic anemia. Stable postoperatively.  Systemic lupus erythematosus.  Quinnell and mycophenolate on hold for surgery.  Thrombocytopenia.  Stable.    -Hopefully can be extubated in the next 24 hours.  Will defer to pulmonary.  - Agree with increasing oral antihypertensives while weaning clevidipine as tolerates.    Juana Taylor MD  11/06/23  07:39 EST

## 2023-11-06 NOTE — OP NOTE
Operative Note    Date of Dictation: 11/06/23    Date of Procedure: 11/06/23    Referring Physician: No ref. provider found    Preoperative diagnosis:  Cardiac Tamponade    Postoperative diagnosis: Same    Procedure:   Reexploration for bleeding.    Surgeon: Mario Lowe MD     Assistants: JACKIE Antunez ([facility]) and Callie Montes, PAC was responsible for performing the following activities: Cardiac Surgery First assist, surgical wound closure and their skilled assistance was necessary for the success of this case.     Anesthesia: General endotracheal anesthesia and CHETNA    Findings:  Clot in front and around the heart. Cardiac Tamponade    Estimated Blood Loss:  50 mL    STS Data: The STS Risk score discussed with the patient and family.  Counseling was done regarding abuse of tobacco, alcohol and drugs as needed. They understand and wish to proceed. The antibiotics and b blockers were given in the STS required window.          Description of the procedure:     The patient was placed supine on the operative table. General anesthesia was given and lines placed. The patient was prepped and draped using the usual sterile technique. A median sternotomy was reopened.  Aortic clot was found in front of the heart and all around it.  After removing the clot away, small bleeding in the suture line was observed. CoSeal was applied to the suture line and that helped. AV temporary wires and mediastinal chest tubes were placed and the wound sprayed with platelet rich plasma. The sternum was closed with single and double wires and soft tissue in layers of reabsorbable material. The wounds were covered with sterile dressings.       Specimen removed:  none    Complications:  none           Disposition: Cardiovascular recovery room           Condition: Critical but stable.

## 2023-11-06 NOTE — SIGNIFICANT NOTE
11/06/23 1014   OTHER   Discipline physical therapist   Rehab Time/Intention   Session Not Performed other (see comments)  (Pt continues to be on the vent. PT will check back tomorrow.)   Recommendation   PT - Next Appointment 11/07/23

## 2023-11-06 NOTE — PROGRESS NOTES
She had a sudden drop in her blood pressure.  On echocardiogram I think there is clot compressing the RV.  We will plan to take her back and I think we have to do this through her sternotomy.  There was some notice of bleeding around the chest tubes prior to this episode.  She did not have heparin today but she had dialysis yesterday.  I discussed with family.

## 2023-11-06 NOTE — PROGRESS NOTES
[facility] Progress Note      Patient Name: Dee Herrera  : 1990  MRN: [id number]  Primary Care Physician:  Margarita Woods APRN  Date of admission: 10/26/2023    Subjective     Interval History:   Follow-up end-stage renal disease on peritoneal dialysis    The patient currently on the ventilator she is postoperative repair of her type I aortic dissection.  She had a right femoral temporary dialysis catheter placed.  Her chemistry still pending.  Currently on Cleviprex drip.  She is more awake and alert, she is getting a spontaneous breathing trial.    Review of Systems:   As noted above    Objective     Vitals:   Temp:  [98 °F (36.7 °C)-99.5 °F (37.5 °C)] 99 °F (37.2 °C)  Heart Rate:  [80-99] 97  Resp:  [21-29] 26  BP: (110-145)/() 112/71  FiO2 (%):  [30 %] 30 %    Intake/Output Summary (Last 24 hours) at 2023 0831  Last data filed at 2023 0805  Gross per 24 hour   Intake 1635.33 ml   Output 2010 ml   Net -374.67 ml       Physical Exam:    General Appearance: On the ventilator, awake, following commands, chronically ill and frail  Skin: warm and dry  HEENT: Orally intubated  Neck: [place] catheter in the right IJ  Lungs: CTA, unlabored breathing effort  Heart: RRR, normal S1 and S2, no rub  Abdomen: soft, no guarding nondistended, normoactive bowels and PD catheter in place with clean exit site  Extremities: no edema, cyanosis or clubbing, temporary dialysis catheter in the right femoral vein  Neuro: Moving all extremities and following,    Scheduled Meds:     amLODIPine, 5 mg, Oral, Q24H  [Held by provider] aspirin, 81 mg, Oral, Daily  atorvastatin, 40 mg, Oral, Nightly  carvedilol, 25 mg, Oral, Q12H  chlorhexidine, 15 mL, Mouth/Throat, Q12H  [Held by provider] heparin (porcine), 5,000 Units, Subcutaneous, Q8H  hydrALAZINE, 25 mg, Oral, Q8H  Lacosamide, 50 mg, Intravenous, Q12H  levETIRAcetam, 500 mg, Intravenous, Q12H  mupirocin, , Each Nare,  BID  pantoprazole, 40 mg, Oral, QAM  senna-docusate sodium, 2 tablet, Oral, Nightly      IV Meds:   clevidipine, 2-32 mg/hr, Last Rate: 18 mg/hr (11/06/23 0825)  dexmedetomidine, 0.2-1.5 mcg/kg/hr, Last Rate: Stopped (11/02/23 2000)  insulin, 0-100 Units/hr  niCARdipine, 5-15 mg/hr  nitroglycerin, 5-200 mcg/min  Pharmacy Consult - Pharmacy to dose,   propofol, 5-50 mcg/kg/min, Last Rate: Stopped (11/03/23 0430)  sodium chloride, 30 mL/hr, Last Rate: 30 mL/hr (11/02/23 1400)        Results Reviewed:   I have personally reviewed the results from the time of this admission to 11/6/2023 08:31 EST     Results from last 7 days   Lab Units 11/06/23 0300 11/05/23 0301 11/04/23 0345 11/03/23 0407   SODIUM mmol/L 136 136 137 140   POTASSIUM mmol/L 4.2 4.2 4.2 4.5   CHLORIDE mmol/L 101 99 101 99   CO2 mmol/L 18.7* 18.4* 21.4* 24.4   BUN mg/dL 18 30* 17 26*   CREATININE mg/dL 3.96* 5.89* 4.35* 5.20*   CALCIUM mg/dL 8.9 9.2 9.5 8.8   BILIRUBIN mg/dL  --   --   --  0.2   ALK PHOS U/L  --   --   --  65   ALT (SGPT) U/L  --   --   --  12   AST (SGOT) U/L  --   --   --  55*   GLUCOSE mg/dL 91 90 108* 126*       Estimated Creatinine Clearance: 18 mL/min (A) (by C-G formula based on SCr of 3.96 mg/dL (H)).    Results from last 7 days   Lab Units 11/05/23 0301 11/04/23 0345 11/03/23 0407 11/02/23 2351 11/02/23  1736   MAGNESIUM mg/dL  --  2.1 2.6  --  2.4   PHOSPHORUS mg/dL 3.9 4.0 3.1   < > 2.7    < > = values in this interval not displayed.             Results from last 7 days   Lab Units 11/06/23  0300 11/05/23  0301 11/04/23  0345 11/03/23  0407 11/02/23  1736   WBC 10*3/mm3 12.30* 10.07 10.11 6.95 5.07   HEMOGLOBIN g/dL 11.8* 8.5* 8.7* 9.3* 9.0*   PLATELETS 10*3/mm3 94* 83* 125* 172 186       Results from last 7 days   Lab Units 11/03/23  0407 11/02/23  1412 11/02/23  1313 11/02/23  1251 11/02/23  1115   INR  1.20* 1.62* 1.4* 1.70* 2.21*       Assessment / Plan     ASSESSMENT:  End-stage renal disease on secondary to lupus  nephritis on peritoneal dialysis, currently on peritoneal dialysis appears to be euvolemic, she had dialysis yesterday without any difficulties   severe hypertension on admission improved significantly associate with noncompliance, blood pressure improved significantly, currently on Cleviprex drip  Hyponatremia associated with excessive water intake, resolved, sodium 136  Cardiomyopathy ejection fraction about 40%  Thrombocytopenia, platelet today 94,000  Anemia of chronic kidney disease hemoglobin today is 11.8  SLE.  Mitral and aortic valve insufficiency with DeBakey type I dissection which is felt to be either subacute or chronic, she underwent repair earlier today  Seizure disorder    PLAN:  Continue the same treatment  Hemodialysis tomorrow  Surveillance lab  I discussed the case with the patient's nurse at the bedside    I reviewed the chart and other providers note, I reviewed imaging and lab data.  Copied text in this note has been reviewed and is accurate as of 11/06/23.       Thank you for involving us in the care of Dee Herrera.  Please feel free to call with any questions.    Isaac Diaz MD  11/06/23  08:31 [facility]    [facility]  [phone]    Please note that portions of this note were completed with a voice recognition program.

## 2023-11-06 NOTE — PROGRESS NOTES
Dr. JAYLA Hay    [facility] CARDIO RECOVERY        Patient ID:  Name:  Dee Herrera  MRN:  [id number]  1990  33 y.o.  female            CC/Reason for visit: Management mechanical ventilation, status post aortic aneurysm/dissection repair with valve sparing procedure.    Interval hx: Currently her seizures have been controlled but unfortunately she is failing her spontaneous breathing trial due to rapid shallow breathing, significant tachypnea, high R SBI and low neph.  She is following commands but she is now becoming diaphoretic and tachypneic on her third attempt at spontaneous breathing trial today.  We cannot extubate in these conditions.  Stat bedside echo has been ordered by cardiothoracic team    ROS: Unobtainable, intubated    Vitals:  Vitals:    11/06/23 1030 11/06/23 1034 11/06/23 1036 11/06/23 1040   BP: (!) 88/74      BP Location:       Patient Position:       Pulse: 111 113 113 113   Resp:       Temp:       TempSrc:       SpO2: 100% 100% 100% 90%   Weight:       Height:         FiO2 (%): 30 %     Body mass index is 20.39 kg/m².    Intake/Output Summary (Last 24 hours) at 11/6/2023 1336  Last data filed at 11/6/2023 1000  Gross per 24 hour   Intake 1575.33 ml   Output 1985 ml   Net -409.67 ml       Exam:  GEN:  Awake  Intubated  Mechanically ventilated  Opens eyes, follows commands  LUNGS: Chest tubes in place.  Distant and diminished breath sounds bilat, no use of accessory muscles  CV:  Sternotomy dressing clean dry and intact, distant heart tones, trace ankle edema  ABD:  Non tender, no enlarged liver or masses      Scheduled meds:  albumin human, , ,   albumin human, , ,   amLODIPine, 5 mg, Oral, Q24H  [Held by provider] aspirin, 81 mg, Oral, Daily  atorvastatin, 40 mg, Oral, Nightly  carvedilol, 12.5 mg, Oral, Q12H  chlorhexidine, 15 mL, Mouth/Throat, Q12H  [Held by provider] heparin (porcine), 5,000 Units, Subcutaneous, Q8H  Lacosamide, 50 mg, Intravenous, Q12H  levETIRAcetam,  500 mg, Intravenous, Q12H  mupirocin, , Each Nare, BID  pantoprazole, 40 mg, Oral, QAM  senna-docusate sodium, 2 tablet, Oral, Nightly  sodium chloride, 4 mL, Nebulization, BID - RT      IV meds:                      dexmedetomidine, 0.2-1.5 mcg/kg/hr, Last Rate: Stopped (11/02/23 2000)  insulin, 0-100 Units/hr  nitroglycerin, 5-200 mcg/min  norepinephrine, 0.02-0.3 mcg/kg/min, Last Rate: 0.02 mcg/kg/min (11/06/23 1248)  Pharmacy Consult - Pharmacy to dose,   propofol, 5-50 mcg/kg/min, Last Rate: Stopped (11/03/23 0430)  sodium chloride, 30 mL/hr, Last Rate: 30 mL/hr (11/02/23 1400)        Data Review:   I reviewed the patient's medications and new clinical results.            Results from last 7 days   Lab Units 11/06/23  0300 11/05/23  0301 11/04/23  0345 11/03/23  0407 11/02/23  1736 11/02/23  1412 11/02/23  1340 11/02/23  1313 10/31/23  0520 10/30/23  1511   SODIUM mmol/L 136 136 137 140   < > 143  --   --    < >  --    POTASSIUM mmol/L 4.2 4.2 4.2 4.5   < > 4.0  --   --    < >  --    CHLORIDE mmol/L 101 99 101 99   < > 100  --   --    < >  --    CO2 mmol/L 18.7* 18.4* 21.4* 24.4   < > 25.4  --   --    < >  --    BUN mg/dL 18 30* 17 26*   < > 18  --   --    < >  --    CREATININE mg/dL 3.96* 5.89* 4.35* 5.20*   < > 4.67*  --   --    < >  --    CALCIUM mg/dL 8.9 9.2 9.5 8.8   < > 8.2*  --   --    < >  --    BILIRUBIN mg/dL  --   --   --  0.2  --   --   --   --   --   --    ALK PHOS U/L  --   --   --  65  --   --   --   --   --   --    ALT (SGPT) U/L  --   --   --  12  --   --   --   --   --   --    AST (SGOT) U/L  --   --   --  55*  --   --   --   --   --   --    GLUCOSE mg/dL 91 90 108* 126*   < > 110*  --   --    < >  --    WBC 10*3/mm3 12.30* 10.07 10.11 6.95   < > 4.02  --   --    < >  --    HEMOGLOBIN g/dL 11.8* 8.5* 8.7* 9.3*   < > 8.2*  --   --    < >  --    HEMOGLOBIN, POC   --   --   --   --   --   --    < >  --   --   --    PLATELETS 10*3/mm3 94* 83* 125* 172   < > 162  --   --    < >  --    INR   --    --   --  1.20*  --  1.62*  --  1.4*   < >  --    PROBNP pg/mL  --   --   --   --   --   --   --   --   --  >70,000.0*    < > = values in this interval not displayed.                 Results from last 7 days   Lab Units 11/06/23  1328 11/06/23  0719 11/05/23  0034 11/04/23  0659 11/04/23  0654 11/03/23  1000 11/02/23  1727 11/02/23  1436   PH, ARTERIAL pH units 7.448 7.440 7.257*  --  7.411 7.599* 7.514* 7.531*   PCO2, ARTERIAL mm Hg 20.2* 29.7* 48.1*  --  35.6 26.3* 37.7 35.1   PO2 ART mm Hg 110.1* 147.5* 526.7*  --  147.2* 125.5* 183.4* 556.0*   O2 SATURATION ART % 98.7* 99.4* 100.0*  --  99.3* 99.4* 99.7* 100.0*   MODALITY  Adult Vent Adult Vent Adult Vent Adult Vent Adult Vent Adult Vent Adult Vent Adult Vent           ASSESSMENT:     Dissecting ascending aortic aneurysm, status post repair  Postoperative respiratory failure  Management mechanical ventilation    Thrombocytopenia    Hypertension secondary to other renal disorders    Pancytopenia    Systemic lupus erythematosus    Pericardial effusion    End stage renal disease on dialysis    Seizure disorder    Elevated liver function tests    Essential hypertension    Peritoneal dialysis catheter in place    Anemia due to chronic kidney disease, on chronic dialysis    Hyponatremia    Poor appetite    Moderate malnutrition    Chronic diastolic CHF (congestive heart failure)    Aortic valve lesion    Severe aortic valve regurgitation        PLAN:  The patient's condition is worse.  She is now neurologically awake and alert enough to follow commands but she is not doing well during spontaneous breathing trial.  She is failing her SBT trials due to rapid shallow breathing with heart rate increasing and high R SBI and anxiety.  I agree with cardiothoracic team that we should order echocardiogram to assess for any possible left ventricular dysfunction or valvular dysfunction.  She is to tachypneic and has low tidal volumes on spontaneous breathing mode to extubate at  this time.  Continue antiepileptics for seizure management.  Remain on mechanical ventilator for now.    Total critical care time 33 minutes        Vincent Hay MD  11/6/2023

## 2023-11-06 NOTE — PROGRESS NOTES
" LOS: 11 days   Patient Care Team:  Margarita Woods APRN as PCP - General (Nurse Practitioner)  Winnie Sanchez MD as Referring Physician (Obstetrics and Gynecology)  Norberto Almaraz MD PhD as Consulting Physician (Hematology and Oncology)  Lupis Thomas MD as Consulting Physician (Nephrology)  Hair Mckeon MD as Consulting Physician (Nephrology)  Juana Taylor MD as Consulting Physician (Cardiology)    Chief Complaint: post op    Subjective      Vital Signs  Temp:  [98 °F (36.7 °C)-99.5 °F (37.5 °C)] 99 °F (37.2 °C)  Heart Rate:  [80-99] 93  Resp:  [21-29] 26  BP: (110-145)/() 117/77  FiO2 (%):  [30 %] 30 %  Body mass index is 20.39 kg/m².    Intake/Output Summary (Last 24 hours) at 11/6/2023 0712  Last data filed at 11/6/2023 0525  Gross per 24 hour   Intake 1535.33 ml   Output 2000 ml   Net -464.67 ml     No intake/output data recorded.    Chest tube drainage last 8 hours         11/02/23  0424 11/03/23  0409 11/04/23  0500   Weight: 44.4 kg (97 lb 14.2 oz) 54.1 kg (119 lb 4.3 oz) 55.5 kg (122 lb 5.7 oz)         Objective:  Vital signs: (most recent): Blood pressure 119/80, pulse 97, temperature 99 °F (37.2 °C), temperature source Oral, resp. rate 26, height 165 cm (64.96\"), weight 55.5 kg (122 lb 5.7 oz), last menstrual period 08/10/2022, SpO2 99%, not currently breastfeeding.                Results Review:        WBC WBC   Date Value Ref Range Status   11/06/2023 12.30 (H) 3.40 - 10.80 10*3/mm3 Final   11/05/2023 10.07 3.40 - 10.80 10*3/mm3 Final   11/04/2023 10.11 3.40 - 10.80 10*3/mm3 Final      HGB Hemoglobin   Date Value Ref Range Status   11/06/2023 11.8 (L) 12.0 - 15.9 g/dL Final   11/05/2023 8.5 (L) 12.0 - 15.9 g/dL Final   11/04/2023 8.7 (L) 12.0 - 15.9 g/dL Final      HCT Hematocrit   Date Value Ref Range Status   11/06/2023 34.5 34.0 - 46.6 % Final   11/05/2023 25.5 (L) 34.0 - 46.6 % Final   11/04/2023 27.0 (L) 34.0 - 46.6 % Final      Platelets Platelets   Date Value Ref " "Range Status   11/06/2023 94 (L) 140 - 450 10*3/mm3 Final   11/05/2023 83 (L) 140 - 450 10*3/mm3 Final   11/04/2023 125 (L) 140 - 450 10*3/mm3 Final        PT/INR:  No results found for: \"PROTIME\"/No results found for: \"INR\"    Sodium Sodium   Date Value Ref Range Status   11/06/2023 136 136 - 145 mmol/L Final   11/05/2023 136 136 - 145 mmol/L Final   11/04/2023 137 136 - 145 mmol/L Final      Potassium Potassium   Date Value Ref Range Status   11/06/2023 4.2 3.5 - 5.2 mmol/L Final     Comment:     Specimen hemolyzed.  Results may be affected.   11/05/2023 4.2 3.5 - 5.2 mmol/L Final   11/04/2023 4.2 3.5 - 5.2 mmol/L Final      Chloride Chloride   Date Value Ref Range Status   11/06/2023 101 98 - 107 mmol/L Final   11/05/2023 99 98 - 107 mmol/L Final   11/04/2023 101 98 - 107 mmol/L Final      Bicarbonate CO2   Date Value Ref Range Status   11/06/2023 18.7 (L) 22.0 - 29.0 mmol/L Final   11/05/2023 18.4 (L) 22.0 - 29.0 mmol/L Final   11/04/2023 21.4 (L) 22.0 - 29.0 mmol/L Final      BUN BUN   Date Value Ref Range Status   11/06/2023 18 6 - 20 mg/dL Final   11/05/2023 30 (H) 6 - 20 mg/dL Final   11/04/2023 17 6 - 20 mg/dL Final      Creatinine Creatinine   Date Value Ref Range Status   11/06/2023 3.96 (H) 0.57 - 1.00 mg/dL Final   11/05/2023 5.89 (H) 0.57 - 1.00 mg/dL Final   11/04/2023 4.35 (H) 0.57 - 1.00 mg/dL Final      Calcium Calcium   Date Value Ref Range Status   11/06/2023 8.9 8.6 - 10.5 mg/dL Final   11/05/2023 9.2 8.6 - 10.5 mg/dL Final   11/04/2023 9.5 8.6 - 10.5 mg/dL Final      Magnesium Magnesium   Date Value Ref Range Status   11/04/2023 2.1 1.6 - 2.6 mg/dL Final          amLODIPine, 5 mg, Oral, Q24H  [Held by provider] aspirin, 81 mg, Oral, Daily  atorvastatin, 40 mg, Oral, Nightly  carvedilol, 12.5 mg, Oral, Q12H  chlorhexidine, 15 mL, Mouth/Throat, Q12H  [Held by provider] heparin (porcine), 5,000 Units, Subcutaneous, Q8H  Lacosamide, 50 mg, Intravenous, Q12H  levETIRAcetam, 500 mg, Intravenous, " Q12H  mupirocin, , Each Nare, BID  pantoprazole, 40 mg, Oral, QAM  senna-docusate sodium, 2 tablet, Oral, Nightly      clevidipine, 2-32 mg/hr, Last Rate: 26 mg/hr (11/06/23 0629)  dexmedetomidine, 0.2-1.5 mcg/kg/hr, Last Rate: Stopped (11/02/23 2000)  DOPamine, 2-20 mcg/kg/min  EPINEPHrine, 0.02-0.1 mcg/kg/min  insulin, 0-100 Units/hr  milrinone, 0.25-0.375 mcg/kg/min  niCARdipine, 5-15 mg/hr  nitroglycerin, 5-200 mcg/min  norepinephrine, 0.02-0.2 mcg/kg/min, Last Rate: 0.02 mcg/kg/min (11/03/23 1337)  Pharmacy Consult - Pharmacy to dose,   phenylephrine, 0.2-2 mcg/kg/min  propofol, 5-50 mcg/kg/min, Last Rate: Stopped (11/03/23 0430)  sodium chloride, 30 mL/hr, Last Rate: 30 mL/hr (11/02/23 1400)              Dissecting ascending aortic aneurysm    Vitamin D deficiency    Thrombocytopenia    Hypertension secondary to other renal disorders    Pancytopenia    Systemic lupus erythematosus    Pericardial effusion    End stage renal disease on dialysis    Seizure disorder    Elevated liver function tests    Essential hypertension    Peritoneal dialysis catheter in place    Anemia due to chronic kidney disease, on chronic dialysis    Hyponatremia    Poor appetite    Moderate malnutrition    Chronic diastolic CHF (congestive heart failure)    Aortic valve lesion    Severe aortic valve regurgitation      Assessment & Plan    - ascending aortic aneurysm with dissection- s/p ascending aortic dissection repair/proximal replacement, gacron interposition graft, kelli procedure; insertion of right femoral shiley- POD#4 Pagni  - severe aortic valve insufficiency  - ESRD on PD  - Lupus--on Cellcept/plaquenil; currently held  - hx of seizures  - chronic immunosuppression  - hypertension  - chronic anemia  - TCP--chronic     Remains intubated   More seizures over the weekend  Following commands-- on a breathing trial this morning  On a lot of cleviprex-- will increase oral medication-- wean as able  She has an OG-- will have speech  place a cortrac and start some nutrition  Dialysis yesterday 1.9L removed  Continue supportive care       CAESAR Cotto  11/06/23  07:12 EST

## 2023-11-07 ENCOUNTER — APPOINTMENT (OUTPATIENT)
Dept: GENERAL RADIOLOGY | Facility: HOSPITAL | Age: 33
DRG: 219 | End: 2023-11-07
Payer: MEDICARE

## 2023-11-07 LAB
ACT BLD: 158 SECONDS (ref 82–152)
ALBUMIN SERPL-MCNC: 2.8 G/DL (ref 3.5–5.2)
ALBUMIN/GLOB SERPL: 1.5 G/DL
ALP SERPL-CCNC: 50 U/L (ref 39–117)
ALT SERPL W P-5'-P-CCNC: <5 U/L (ref 1–33)
ANION GAP SERPL CALCULATED.3IONS-SCNC: 14 MMOL/L (ref 5–15)
ARTERIAL PATENCY WRIST A: ABNORMAL
AST SERPL-CCNC: 23 U/L (ref 1–32)
ATMOSPHERIC PRESS: 746.3 MMHG
BASE EXCESS BLDA CALC-SCNC: -2.9 MMOL/L (ref 0–2)
BASOPHILS # BLD AUTO: 0.02 10*3/MM3 (ref 0–0.2)
BASOPHILS NFR BLD AUTO: 0.2 % (ref 0–1.5)
BDY SITE: ABNORMAL
BH BB BLOOD EXPIRATION DATE: NORMAL
BH BB BLOOD TYPE BARCODE: 5100
BH BB DISPENSE STATUS: NORMAL
BH BB PRODUCT CODE: NORMAL
BH BB UNIT NUMBER: NORMAL
BILIRUB SERPL-MCNC: 0.3 MG/DL (ref 0–1.2)
BUN SERPL-MCNC: 30 MG/DL (ref 6–20)
BUN/CREAT SERPL: 6.3 (ref 7–25)
CALCIUM SPEC-SCNC: 8.3 MG/DL (ref 8.6–10.5)
CHLORIDE SERPL-SCNC: 106 MMOL/L (ref 98–107)
CO2 BLDA-SCNC: 20.7 MMOL/L (ref 23–27)
CO2 SERPL-SCNC: 20 MMOL/L (ref 22–29)
CREAT SERPL-MCNC: 4.75 MG/DL (ref 0.57–1)
CROSSMATCH INTERPRETATION: NORMAL
CROSSMATCH INTERPRETATION: NORMAL
DEPRECATED RDW RBC AUTO: 47.3 FL (ref 37–54)
EGFRCR SERPLBLD CKD-EPI 2021: 11.9 ML/MIN/1.73
EOSINOPHIL # BLD AUTO: 0.02 10*3/MM3 (ref 0–0.4)
EOSINOPHIL NFR BLD AUTO: 0.2 % (ref 0.3–6.2)
ERYTHROCYTE [DISTWIDTH] IN BLOOD BY AUTOMATED COUNT: 15.7 % (ref 12.3–15.4)
GLOBULIN UR ELPH-MCNC: 1.9 GM/DL
GLUCOSE SERPL-MCNC: 89 MG/DL (ref 65–99)
HCO3 BLDA-SCNC: 19.9 MMOL/L (ref 22–28)
HCT VFR BLD AUTO: 26.7 % (ref 34–46.6)
HEMODILUTION: NO
HGB BLD-MCNC: 8.9 G/DL (ref 12–15.9)
INHALED O2 CONCENTRATION: 40 %
INR PPP: 1.6 (ref 0.8–1.2)
LYMPHOCYTES # BLD AUTO: 0.47 10*3/MM3 (ref 0.7–3.1)
LYMPHOCYTES NFR BLD AUTO: 5.1 % (ref 19.6–45.3)
MAGNESIUM SERPL-MCNC: 1.9 MG/DL (ref 1.6–2.6)
MCH RBC QN AUTO: 28.3 PG (ref 26.6–33)
MCHC RBC AUTO-ENTMCNC: 33.3 G/DL (ref 31.5–35.7)
MCV RBC AUTO: 84.8 FL (ref 79–97)
MODALITY: ABNORMAL
MONOCYTES # BLD AUTO: 0.81 10*3/MM3 (ref 0.1–0.9)
MONOCYTES NFR BLD AUTO: 8.8 % (ref 5–12)
NEUTROPHILS NFR BLD AUTO: 7.82 10*3/MM3 (ref 1.7–7)
NEUTROPHILS NFR BLD AUTO: 84.5 % (ref 42.7–76)
O2 A-A PPRESDIFF RESPIRATORY: 0.6 MMHG
PAW @ PEAK INSP FLOW SETTING VENT: 13 CMH2O
PCO2 BLDA: 27.2 MM HG (ref 35–45)
PEEP RESPIRATORY: 5 CM[H2O]
PH BLDA: 7.47 PH UNITS (ref 7.35–7.45)
PHOSPHATE SERPL-MCNC: 5.2 MG/DL (ref 2.5–4.5)
PLATELET # BLD AUTO: 88 10*3/MM3 (ref 140–450)
PMV BLD AUTO: 12.9 FL (ref 6–12)
PO2 BLDA: 153.9 MM HG (ref 80–100)
POTASSIUM SERPL-SCNC: 4 MMOL/L (ref 3.5–5.2)
PROT SERPL-MCNC: 4.7 G/DL (ref 6–8.5)
PROTHROMBIN TIME: 18.6 SECONDS (ref 12.8–15.2)
PSV: 8 CMH2O
RBC # BLD AUTO: 3.15 10*6/MM3 (ref 3.77–5.28)
SAO2 % BLDCOA: 99.5 % (ref 92–98.5)
SODIUM SERPL-SCNC: 140 MMOL/L (ref 136–145)
TOTAL RATE: 26 BREATHS/MINUTE
UNIT  ABO: NORMAL
UNIT  RH: NORMAL
VENTILATOR MODE: ABNORMAL
WBC NRBC COR # BLD: 9.25 10*3/MM3 (ref 3.4–10.8)

## 2023-11-07 PROCEDURE — 94799 UNLISTED PULMONARY SVC/PX: CPT

## 2023-11-07 PROCEDURE — 25010000002 HYDRALAZINE PER 20 MG

## 2023-11-07 PROCEDURE — 25010000002 LEVETRIRACETAM PER 10 MG

## 2023-11-07 PROCEDURE — 99232 SBSQ HOSP IP/OBS MODERATE 35: CPT | Performed by: INTERNAL MEDICINE

## 2023-11-07 PROCEDURE — 94003 VENT MGMT INPAT SUBQ DAY: CPT

## 2023-11-07 PROCEDURE — 25810000003 SODIUM CHLORIDE 0.9 % SOLUTION 250 ML FLEX CONT

## 2023-11-07 PROCEDURE — 25010000002 HEPARIN (PORCINE) PER 1000 UNITS

## 2023-11-07 PROCEDURE — 94761 N-INVAS EAR/PLS OXIMETRY MLT: CPT

## 2023-11-07 PROCEDURE — 99024 POSTOP FOLLOW-UP VISIT: CPT | Performed by: THORACIC SURGERY (CARDIOTHORACIC VASCULAR SURGERY)

## 2023-11-07 PROCEDURE — 25010000002 LACOSAMIDE 200 MG/20ML SOLUTION

## 2023-11-07 PROCEDURE — 83735 ASSAY OF MAGNESIUM: CPT

## 2023-11-07 PROCEDURE — 85025 COMPLETE CBC W/AUTO DIFF WBC: CPT

## 2023-11-07 PROCEDURE — 94760 N-INVAS EAR/PLS OXIMETRY 1: CPT

## 2023-11-07 PROCEDURE — 71045 X-RAY EXAM CHEST 1 VIEW: CPT

## 2023-11-07 PROCEDURE — 80053 COMPREHEN METABOLIC PANEL: CPT

## 2023-11-07 PROCEDURE — C9254 INJECTION, LACOSAMIDE: HCPCS

## 2023-11-07 PROCEDURE — 82803 BLOOD GASES ANY COMBINATION: CPT

## 2023-11-07 PROCEDURE — 84100 ASSAY OF PHOSPHORUS: CPT

## 2023-11-07 RX ORDER — PANTOPRAZOLE SODIUM 40 MG/10ML
40 INJECTION, POWDER, LYOPHILIZED, FOR SOLUTION INTRAVENOUS ONCE
Status: COMPLETED | OUTPATIENT
Start: 2023-11-07 | End: 2023-11-07

## 2023-11-07 RX ORDER — DEXTROSE MONOHYDRATE 25 G/50ML
25 INJECTION, SOLUTION INTRAVENOUS
Status: DISCONTINUED | OUTPATIENT
Start: 2023-11-07 | End: 2023-12-09 | Stop reason: HOSPADM

## 2023-11-07 RX ORDER — HYDRALAZINE HYDROCHLORIDE 20 MG/ML
10 INJECTION INTRAMUSCULAR; INTRAVENOUS ONCE
Status: COMPLETED | OUTPATIENT
Start: 2023-11-07 | End: 2023-11-07

## 2023-11-07 RX ORDER — MANNITOL 250 MG/ML
12.5 INJECTION, SOLUTION INTRAVENOUS AS NEEDED
Status: ACTIVE | OUTPATIENT
Start: 2023-11-07 | End: 2023-11-07

## 2023-11-07 RX ORDER — NICOTINE POLACRILEX 4 MG
15 LOZENGE BUCCAL
Status: DISCONTINUED | OUTPATIENT
Start: 2023-11-07 | End: 2023-12-09 | Stop reason: HOSPADM

## 2023-11-07 RX ORDER — MANNITOL 250 MG/ML
12.5 INJECTION, SOLUTION INTRAVENOUS AS NEEDED
Status: ACTIVE | OUTPATIENT
Start: 2023-11-07 | End: 2023-11-08

## 2023-11-07 RX ORDER — IBUPROFEN 600 MG/1
1 TABLET ORAL
Status: DISCONTINUED | OUTPATIENT
Start: 2023-11-07 | End: 2023-12-09 | Stop reason: HOSPADM

## 2023-11-07 RX ORDER — LEVETIRACETAM 500 MG/5ML
250 INJECTION, SOLUTION, CONCENTRATE INTRAVENOUS ONCE
Status: COMPLETED | OUTPATIENT
Start: 2023-11-07 | End: 2023-11-07

## 2023-11-07 RX ADMIN — LEVETIRACETAM 500 MG: 500 INJECTION, SOLUTION INTRAVENOUS at 20:08

## 2023-11-07 RX ADMIN — AMLODIPINE BESYLATE 5 MG: 5 TABLET ORAL at 08:16

## 2023-11-07 RX ADMIN — Medication 4 ML: at 07:26

## 2023-11-07 RX ADMIN — LEVETIRACETAM 250 MG: 500 INJECTION, SOLUTION INTRAVENOUS at 19:26

## 2023-11-07 RX ADMIN — HYDRALAZINE HYDROCHLORIDE 20 MG: 20 INJECTION INTRAMUSCULAR; INTRAVENOUS at 22:30

## 2023-11-07 RX ADMIN — CARVEDILOL 12.5 MG: 12.5 TABLET, FILM COATED ORAL at 08:16

## 2023-11-07 RX ADMIN — Medication 4 ML: at 19:33

## 2023-11-07 RX ADMIN — SENNOSIDES AND DOCUSATE SODIUM 2 TABLET: 50; 8.6 TABLET ORAL at 20:08

## 2023-11-07 RX ADMIN — NICARDIPINE HYDROCHLORIDE 6 MG/HR: 25 INJECTION, SOLUTION INTRAVENOUS at 00:42

## 2023-11-07 RX ADMIN — 0.12% CHLORHEXIDINE GLUCONATE 15 ML: 1.2 RINSE ORAL at 20:08

## 2023-11-07 RX ADMIN — ACETAMINOPHEN 650 MG: 160 SOLUTION ORAL at 20:08

## 2023-11-07 RX ADMIN — LACOSAMIDE 50 MG: 10 INJECTION INTRAVENOUS at 11:41

## 2023-11-07 RX ADMIN — ATORVASTATIN CALCIUM 40 MG: 20 TABLET, FILM COATED ORAL at 20:08

## 2023-11-07 RX ADMIN — PANTOPRAZOLE SODIUM 40 MG: 40 INJECTION, POWDER, FOR SOLUTION INTRAVENOUS at 07:06

## 2023-11-07 RX ADMIN — HYDRALAZINE HYDROCHLORIDE 10 MG: 20 INJECTION INTRAMUSCULAR; INTRAVENOUS at 21:03

## 2023-11-07 RX ADMIN — IPRATROPIUM BROMIDE AND ALBUTEROL SULFATE 3 ML: 2.5; .5 SOLUTION RESPIRATORY (INHALATION) at 07:25

## 2023-11-07 RX ADMIN — LEVETIRACETAM 250 MG: 100 INJECTION INTRAVENOUS at 18:39

## 2023-11-07 RX ADMIN — HEPARIN SODIUM 3000 UNITS: 1000 INJECTION INTRAVENOUS; SUBCUTANEOUS at 18:12

## 2023-11-07 RX ADMIN — HYDRALAZINE HYDROCHLORIDE 20 MG: 20 INJECTION INTRAMUSCULAR; INTRAVENOUS at 18:03

## 2023-11-07 RX ADMIN — 0.12% CHLORHEXIDINE GLUCONATE 15 ML: 1.2 RINSE ORAL at 08:16

## 2023-11-07 RX ADMIN — MUPIROCIN 1 APPLICATION: 20 OINTMENT TOPICAL at 08:17

## 2023-11-07 RX ADMIN — LEVETIRACETAM 500 MG: 500 INJECTION, SOLUTION INTRAVENOUS at 08:33

## 2023-11-07 RX ADMIN — HYDRALAZINE HYDROCHLORIDE 20 MG: 20 INJECTION INTRAMUSCULAR; INTRAVENOUS at 10:08

## 2023-11-07 RX ADMIN — CARVEDILOL 12.5 MG: 12.5 TABLET, FILM COATED ORAL at 20:08

## 2023-11-07 RX ADMIN — MUPIROCIN 1 APPLICATION: 20 OINTMENT TOPICAL at 20:08

## 2023-11-07 RX ADMIN — IPRATROPIUM BROMIDE AND ALBUTEROL SULFATE 3 ML: 2.5; .5 SOLUTION RESPIRATORY (INHALATION) at 19:30

## 2023-11-07 NOTE — ANESTHESIA POSTPROCEDURE EVALUATION
"Patient: Dee Herrera    Procedure Summary       Date: 11/06/23 Room / Location: [address] CARDIOVASCULAR OPERATING ROOM    Anesthesia Start: 1520 Anesthesia Stop: 1743    Procedure: STERNAL EXPLORATION AND WASH OUT (Chest) Diagnosis:       Pericardial effusion      (Pericardial effusion [I31.39])    Surgeons: Jr Mitesh Quiroz MD Provider: Jose Juan Monique MD    Anesthesia Type: general ASA Status: 4 - Emergent            Anesthesia Type: general    Vitals  Vitals Value Taken Time   /89 11/07/23 1546   Temp 36.8 °C (98.3 °F) 11/07/23 1330   Pulse 92 11/07/23 1553   Resp 20 11/07/23 1515   SpO2 100 % 11/07/23 1553   Vitals shown include unfiled device data.        Post Anesthesia Care and Evaluation    Patient participation: complete - patient cannot participate  Level of consciousness: responsive to physical stimuli  Pain management: adequate    Airway patency: patent  Anesthetic complications: No anesthetic complications  PONV Status: none  Cardiovascular status: acceptable  Respiratory status: intubated  Hydration status: acceptable    Comments: /73   Pulse 90   Temp 36.8 °C (98.3 °F) (Oral)   Resp 20   Ht 165 cm (64.96\")   Wt 60.1 kg (132 lb 7.9 oz)   LMP 08/10/2022 (Approximate)   SpO2 100%   BMI 22.08 kg/m²       "

## 2023-11-07 NOTE — SIGNIFICANT NOTE
11/07/23 1046   OTHER   Discipline physical therapist   Rehab Time/Intention   Session Not Performed other (see comments)  (Pt continues to be on the vent. PT will check back tomorrow.)   Recommendation   PT - Next Appointment 11/08/23

## 2023-11-07 NOTE — PROGRESS NOTES
Nutrition Services    Patient Name:  Dee Herrera  YOB: 1990  MRN: [id number]  Admit Date:  10/26/2023    Cortrak placement attempted, unsuccessful.  OG remains in place.  Plans for possible extubation later today after dialysis, RN reports still awaiting dialysis.      Cortrak tube left at bedside.  RN to place this NG if patient ends up getting extubated today.  RD can advance cortrak tomorrow.    Plans for trickle feeds to start today via OG - Novasource Renal at 10 mL/hr.  Do not advance.    RD to continue to follow closely.    Electronically signed by:  Shell Horn RD  11/07/23 13:18 EST

## 2023-11-07 NOTE — PROGRESS NOTES
[facility] Progress Note      Patient Name: Dee Herrera  : 1990  MRN: [id number]  Primary Care Physician:  Margarita Woods APRN  Date of admission: 10/26/2023    Subjective     Interval History:   Follow-up end-stage renal disease on peritoneal dialysis    The patient had an evacuation of a pericardial clot causing tamponade yesterday evening she is back on the ventilator not on any pressors her Cardene drip is being weaned off.  She is sedated    Review of Systems:   As noted above    Objective     Vitals:   Temp:  [97.7 °F (36.5 °C)-98.8 °F (37.1 °C)] 98.8 °F (37.1 °C)  Heart Rate:  [] 79  Resp:  [20-27] 20  BP: ()/(37-85) 108/65  FiO2 (%):  [30 %-98 %] 98 %    Intake/Output Summary (Last 24 hours) at 2023 0853  Last data filed at 2023 0655  Gross per 24 hour   Intake 3701.8 ml   Output 363 ml   Net 3338.8 ml       Physical Exam:    General Appearance: On the ventilator, sedated, chronically ill and frail  Skin: warm and dry  HEENT: Orally intubated  Neck: Myrtle-Ryanne catheter in the right IJ  Lungs: Scattered rhonchi unlabored breathing effort  Heart: RRR, faint rub  Abdomen: soft, no guarding nondistended, normoactive bowels and PD catheter in place with clean exit site  Extremities: no edema, cyanosis or clubbing, temporary dialysis catheter in the right femoral vein  Neuro: Unable to assess today    Scheduled Meds:     amLODIPine, 5 mg, Oral, Q24H  [Held by provider] aspirin, 81 mg, Oral, Daily  atorvastatin, 40 mg, Oral, Nightly  carvedilol, 12.5 mg, Oral, Q12H  chlorhexidine, 15 mL, Mouth/Throat, Q12H  [Held by provider] heparin (porcine), 5,000 Units, Subcutaneous, Q8H  Lacosamide, 50 mg, Intravenous, Q12H  levETIRAcetam, 500 mg, Intravenous, Q12H  mupirocin, , Each Nare, BID  pantoprazole, 40 mg, Oral, QAM  senna-docusate sodium, 2 tablet, Oral, Nightly  sodium chloride, 4 mL, Nebulization, BID - RT      IV Meds:   dexmedetomidine, 0.2-1.5  mcg/kg/hr, Last Rate: Stopped (11/07/23 0555)  EPINEPHrine, 0.02-0.3 mcg/kg/min  insulin, 0-100 Units/hr  niCARdipine, 5-15 mg/hr, Last Rate: Stopped (11/07/23 0658)  nitroglycerin, 5-200 mcg/min  norepinephrine, 0.02-0.3 mcg/kg/min, Last Rate: Stopped (11/06/23 1607)  Pharmacy Consult - Pharmacy to dose,   propofol, 5-50 mcg/kg/min, Last Rate: Stopped (11/06/23 2333)  sodium chloride, 30 mL/hr, Last Rate: 30 mL/hr (11/02/23 1400)  vasopressin, 0.02-0.1 Units/min        Results Reviewed:   I have personally reviewed the results from the time of this admission to 11/7/2023 08:53 EST     Results from last 7 days   Lab Units 11/07/23  0256 11/06/23  1754 11/06/23  1312 11/04/23  0345 11/03/23  0407   SODIUM mmol/L 140 139 136  135*   < > 140   POTASSIUM mmol/L 4.0 4.0 5.2  5.1   < > 4.5   CHLORIDE mmol/L 106 104 103  103   < > 99   CO2 mmol/L 20.0* 20.1* 14.0*  15.0*   < > 24.4   BUN mg/dL 30* 24* 26*  26*   < > 26*   CREATININE mg/dL 4.75* 4.27* 4.72*  4.78*   < > 5.20*   CALCIUM mg/dL 8.3* 7.9* 10.2  10.1   < > 8.8   BILIRUBIN mg/dL 0.3  --  0.3  --  0.2   ALK PHOS U/L 50  --  65  --  65   ALT (SGPT) U/L <5  --  <5  --  12   AST (SGOT) U/L 23  --  22  --  55*   GLUCOSE mg/dL 89 108* 66  67   < > 126*    < > = values in this interval not displayed.       Estimated Creatinine Clearance: 14.9 mL/min (A) (by C-G formula based on SCr of 4.75 mg/dL (H)).    Results from last 7 days   Lab Units 11/07/23  0256 11/06/23  1754 11/06/23  1312 11/05/23  0301 11/04/23  0345 11/03/23  0407   MAGNESIUM mg/dL 1.9  --   --   --  2.1 2.6   PHOSPHORUS mg/dL 5.2* 5.0* 5.6*   < > 4.0 3.1    < > = values in this interval not displayed.             Results from last 7 days   Lab Units 11/07/23  0256 11/06/23  1754 11/06/23  1631 11/06/23  1607 11/06/23  1312 11/06/23  0300   WBC 10*3/mm3 9.25 8.73 7.86  --  13.01* 12.30*   HEMOGLOBIN g/dL 8.9* 8.6* 4.9*  --  8.9* 11.8*   HEMOGLOBIN, POC g/dL  --   --   --  6.5*  --   --     PLATELETS 10*3/mm3 88* 92* 123*  --  110* 94*       Results from last 7 days   Lab Units 11/06/23  1754 11/06/23  1631 11/03/23  0407 11/02/23  1412 11/02/23  1313   INR  1.50* 1.69* 1.20* 1.62* 1.4*       Assessment / Plan     ASSESSMENT:  End-stage renal disease on secondary to lupus nephritis on peritoneal dialysis, she had the hemodialysis on Sunday with plan for dialysis today, her volume status within acceptable range and electrolyte within acceptable range.  Severe hypertension on admission improved significantly associate with noncompliance, blood pressure improved significantly, currently on Cleviprex drip  Hyponatremia associated with excessive water intake, resolved, sodium today is 140  Cardiomyopathy ejection fraction about 40%  Thrombocytopenia, platelet today 94,000  Anemia of chronic kidney disease hemoglobin today is 8.9  SLE.  Mitral and aortic valve insufficiency with DeBakey type I dissection which is felt to be either subacute or chronic, she underwent repair earlier today  Seizure disorder    PLAN:  Continue the same treatment  Hemodialysis t today  Surveillance lab  I discussed the case with the patient's nurse at the bedside    I reviewed the chart and other providers note, I reviewed imaging and lab data.  Copied text in this note has been reviewed and is accurate as of 11/07/23.       Thank you for involving us in the care of Dee Herrera.  Please feel free to call with any questions.    Isaac Diaz MD  11/07/23  08:53 [facility]    [facility]  [phone]    Please note that portions of this note were completed with a voice recognition program.

## 2023-11-07 NOTE — PROGRESS NOTES
[facility] Clinical Pharmacy Services: Keppra Consult    Dee FLORIDA Esparzaes has a pharmacy consult to dose Keppra per Dr. Lowe's request.     Indication:  Seizures    Relevant clinical data and objective history reviewed:    Past Medical History:   Diagnosis Date    Anasarca     PER CT SCAN    Dry skin     ESRD (end stage renal disease) on dialysis     TUMARIA ISABEL, THZACK, [address]    History of abdominal pain     History of anemia     History of transfusion     Hypertension     Iron deficiency anemia 09/27/2021    Lupus (systemic lupus erythematosus) 07/30/2022    Migraine     Other specified nutritional anemias     Pericardial effusion     Renal insufficiency     Seizures     STATES LAST WAS 1/2023    Shortness of breath     OCCASIONAL    Vitamin D deficiency 09/27/2021     Assessment/Plan    Current dose: Keppra 500mg q12h. Ordered Keppra 250mg x1 to be given after 11/7 HD session. (medication is 50% dialyzable)     Thank you for this consult and please contact pharmacy with any questions or concerns.     Elaina Correa, PharmD  Clinical Pharmacist

## 2023-11-07 NOTE — PROGRESS NOTES
Dr. JAYLA Hay    [facility] CARDIO RECOVERY        Patient ID:  Name:  Dee Herrera  MRN:  [id number]  1990  33 y.o.  female            CC/Reason for visit:Management mechanical ventilation, status post aortic aneurysm/dissection repair with valve sparing procedure.     Interval hx: Patient remains intubated, mechanically ventilated.  Opens eyes, follows a few simple commands.  Yesterday failed spontaneous breathing trial 3 separate occasions.  She became hypotensive and stat bedside echo was ordered.  It showed that she had cardiac tamponade and was immediately taken to the operating room for removal of blood clot from the pericardial space.    ROS: Unobtainable, intubated    Vitals:  Vitals:    11/07/23 0900 11/07/23 1000 11/07/23 1105 11/07/23 1200   BP: 109/73 115/80  107/69   BP Location: Left arm   Left arm   Patient Position: Lying   Lying   Pulse: 82 84 85 87   Resp: 20  20 20   Temp: 98.5 °F (36.9 °C)   98.3 °F (36.8 °C)   TempSrc: Oral   Oral   SpO2: 100% 100% 100% 100%   Weight:       Height:         FiO2 (%): 40 %     Body mass index is 20.2 kg/m².    Intake/Output Summary (Last 24 hours) at 11/7/2023 1244  Last data filed at 11/7/2023 1200  Gross per 24 hour   Intake 3791.8 ml   Output 318 ml   Net 3473.8 ml       Exam:  GEN:               Awake  Intubated  Mechanically ventilated  Opens eyes, follows commands  LUNGS:           Chest tubes in place.  Distant and diminished breath sounds bilat, no use of accessory muscles  CV:                  Sternotomy dressing clean dry and intact, distant heart tones, trace ankle edema  ABD:                Non tender, no enlarged liver or masses      Scheduled meds:  amLODIPine, 5 mg, Oral, Q24H  [Held by provider] aspirin, 81 mg, Oral, Daily  atorvastatin, 40 mg, Oral, Nightly  carvedilol, 12.5 mg, Oral, Q12H  chlorhexidine, 15 mL, Mouth/Throat, Q12H  [Held by provider] heparin (porcine), 5,000 Units, Subcutaneous, Q8H  Lacosamide, 50 mg,  Intravenous, Q12H  levETIRAcetam, 250 mg, Intravenous, Once  levETIRAcetam, 500 mg, Intravenous, Q12H  mupirocin, , Each Nare, BID  pantoprazole, 40 mg, Oral, QAM  senna-docusate sodium, 2 tablet, Oral, Nightly  sodium chloride, 4 mL, Nebulization, BID - RT      IV meds:                      dexmedetomidine, 0.2-1.5 mcg/kg/hr, Last Rate: Stopped (11/07/23 0555)  EPINEPHrine, 0.02-0.3 mcg/kg/min  insulin, 0-100 Units/hr  niCARdipine, 5-15 mg/hr, Last Rate: Stopped (11/07/23 0600)  nitroglycerin, 5-200 mcg/min  norepinephrine, 0.02-0.3 mcg/kg/min, Last Rate: Stopped (11/06/23 1607)  Pharmacy Consult - Pharmacy to dose,   propofol, 5-50 mcg/kg/min, Last Rate: Stopped (11/06/23 1042)  sodium chloride, 30 mL/hr, Last Rate: 30 mL/hr (11/02/23 1400)  vasopressin, 0.02-0.1 Units/min        Data Review:   I reviewed the patient's medications and new clinical results.            Results from last 7 days   Lab Units 11/07/23  0256 11/06/23  1754 11/06/23  1631 11/06/23  1607 11/06/23  1312 11/04/23  0345 11/03/23  0407   SODIUM mmol/L 140 139  --   --  136  135*   < > 140   POTASSIUM mmol/L 4.0 4.0  --   --  5.2  5.1   < > 4.5   CHLORIDE mmol/L 106 104  --   --  103  103   < > 99   CO2 mmol/L 20.0* 20.1*  --   --  14.0*  15.0*   < > 24.4   BUN mg/dL 30* 24*  --   --  26*  26*   < > 26*   CREATININE mg/dL 4.75* 4.27*  --   --  4.72*  4.78*   < > 5.20*   CALCIUM mg/dL 8.3* 7.9*  --   --  10.2  10.1   < > 8.8   BILIRUBIN mg/dL 0.3  --   --   --  0.3  --  0.2   ALK PHOS U/L 50  --   --   --  65  --  65   ALT (SGPT) U/L <5  --   --   --  <5  --  12   AST (SGOT) U/L 23  --   --   --  22  --  55*   GLUCOSE mg/dL 89 108*  --   --  66  67   < > 126*   WBC 10*3/mm3 9.25 8.73 7.86  --  13.01*   < > 6.95   HEMOGLOBIN g/dL 8.9* 8.6* 4.9*  --  8.9*   < > 9.3*   HEMOGLOBIN, POC   --   --   --    < >  --   --   --    PLATELETS 10*3/mm3 88* 92* 123*  --  110*   < > 172   INR   --  1.50* 1.69*  --   --   --  1.20*    < > = values in  this interval not displayed.                 Results from last 7 days   Lab Units 11/06/23  1607 11/06/23  1328 11/06/23  0719 11/05/23  0034 11/04/23  0659 11/04/23  0654 11/03/23  1000 11/02/23  1727 11/02/23  1436   PH, ARTERIAL pH units 7.4210 7.448 7.440 7.257*  --  7.411 7.599* 7.514* 7.531*   PCO2, ARTERIAL mm Hg  --  20.2* 29.7* 48.1*  --  35.6 26.3* 37.7 35.1   PO2 ART mm Hg  --  110.1* 147.5* 526.7*  --  147.2* 125.5* 183.4* 556.0*   O2 SATURATION ART %  --  98.7* 99.4* 100.0*  --  99.3* 99.4* 99.7* 100.0*   MODALITY   --  Adult Vent Adult Vent Adult Vent Adult Vent Adult Vent Adult Vent Adult Vent Adult Vent             ASSESSMENT:   Dissecting ascending aortic aneurysm, status post repair  Postoperative respiratory failure  Management mechanical ventilation    Thrombocytopenia    Hypertension secondary to other renal disorders    Pancytopenia    Systemic lupus erythematosus    Pericardial effusion    End stage renal disease on dialysis    Seizure disorder    Elevated liver function tests    Essential hypertension    Peritoneal dialysis catheter in place    Anemia due to chronic kidney disease, on chronic dialysis    Hyponatremia    Poor appetite    Moderate malnutrition    Chronic diastolic CHF (congestive heart failure)    Aortic valve lesion    Severe aortic valve regurgitation      PLAN:  Unfortunately she developed pericardial tamponade with blood clots and had to be reexplored for bleeding last night.  She went to the operating room and they removed pericardial clotted blood.  Hemodynamically she is now much better.  Her blood pressure has improved.  Neurologically she is awake, alert although a little sleepy.  She does follow some simple commands.  We will try spontaneous breathing trial after hemodialysis later on this evening.          Vincent Hay MD  11/7/2023

## 2023-11-07 NOTE — NURSING NOTE
Several calls to [facility] throughout the morning for dialysis timing. Awaiting dialysis RN.

## 2023-11-07 NOTE — PROGRESS NOTES
" LOS: 12 days   Patient Care Team:  Margarita Woods APRN as PCP - General (Nurse Practitioner)  Winnie Sanchez MD as Referring Physician (Obstetrics and Gynecology)  Norberto Almaraz MD PhD as Consulting Physician (Hematology and Oncology)  Lupis Thomas MD as Consulting Physician (Nephrology)  Hair Mckeon MD as Consulting Physician (Nephrology)  Juana Taylor MD as Consulting Physician (Cardiology)    Chief Complaint: post op    Subjective      Vital Signs  Temp:  [97.7 °F (36.5 °C)-99.6 °F (37.6 °C)] 98.8 °F (37.1 °C)  Heart Rate:  [] 79  Resp:  [20-27] 21  BP: ()/(37-89) 108/65  FiO2 (%):  [30 %-40 %] 40 %  Body mass index is 20.2 kg/m².    Intake/Output Summary (Last 24 hours) at 11/7/2023 0703  Last data filed at 11/7/2023 0500  Gross per 24 hour   Intake 3054.8 ml   Output 373 ml   Net 2681.8 ml     No intake/output data recorded.    Chest tube drainage last 8 hours 58/25        11/03/23  0409 11/04/23  0500 11/06/23  1340   Weight: 54.1 kg (119 lb 4.3 oz) 55.5 kg (122 lb 5.7 oz) 55 kg (121 lb 4.1 oz)         Objective:  Vital signs: (most recent): Blood pressure 108/65, pulse 79, temperature 98.8 °F (37.1 °C), temperature source Oral, resp. rate 21, height 165 cm (64.96\"), weight 55 kg (121 lb 4.1 oz), last menstrual period 08/10/2022, SpO2 100%, not currently breastfeeding.                Results Review:        WBC WBC   Date Value Ref Range Status   11/07/2023 9.25 3.40 - 10.80 10*3/mm3 Final   11/06/2023 8.73 3.40 - 10.80 10*3/mm3 Final   11/06/2023 7.86 3.40 - 10.80 10*3/mm3 Final   11/06/2023 13.01 (H) 3.40 - 10.80 10*3/mm3 Final   11/06/2023 12.30 (H) 3.40 - 10.80 10*3/mm3 Final   11/05/2023 10.07 3.40 - 10.80 10*3/mm3 Final      HGB Hemoglobin   Date Value Ref Range Status   11/07/2023 8.9 (L) 12.0 - 15.9 g/dL Final   11/06/2023 8.6 (L) 12.0 - 15.9 g/dL Final   11/06/2023 4.9 (C) 12.0 - 15.9 g/dL Final   11/06/2023 6.5 (C) 12.0 - 17.0 g/dL Final   11/06/2023 8.9 (L) " 12.0 - 15.9 g/dL Final   11/06/2023 11.8 (L) 12.0 - 15.9 g/dL Final   11/05/2023 8.5 (L) 12.0 - 15.9 g/dL Final      HCT Hematocrit   Date Value Ref Range Status   11/07/2023 26.7 (L) 34.0 - 46.6 % Final   11/06/2023 26.0 (L) 34.0 - 46.6 % Final   11/06/2023 15.4 (C) 34.0 - 46.6 % Final   11/06/2023 19 (L) 38 - 51 % Final   11/06/2023 28.5 (L) 34.0 - 46.6 % Final   11/06/2023 34.5 34.0 - 46.6 % Final   11/05/2023 25.5 (L) 34.0 - 46.6 % Final      Platelets Platelets   Date Value Ref Range Status   11/07/2023 88 (L) 140 - 450 10*3/mm3 Final   11/06/2023 92 (L) 140 - 450 10*3/mm3 Final   11/06/2023 123 (L) 140 - 450 10*3/mm3 Final   11/06/2023 110 (L) 140 - 450 10*3/mm3 Final   11/06/2023 94 (L) 140 - 450 10*3/mm3 Final   11/05/2023 83 (L) 140 - 450 10*3/mm3 Final        PT/INR:    Protime   Date Value Ref Range Status   11/06/2023 18.4 (H) 11.7 - 14.2 Seconds Final   11/06/2023 20.1 (H) 11.7 - 14.2 Seconds Final   /  INR   Date Value Ref Range Status   11/06/2023 1.50 (H) 0.90 - 1.10 Final   11/06/2023 1.69 (H) 0.90 - 1.10 Final       Sodium Sodium   Date Value Ref Range Status   11/07/2023 140 136 - 145 mmol/L Final   11/06/2023 139 136 - 145 mmol/L Final   11/06/2023 135 (L) 136 - 145 mmol/L Final   11/06/2023 136 136 - 145 mmol/L Final   11/06/2023 136 136 - 145 mmol/L Final   11/05/2023 136 136 - 145 mmol/L Final      Potassium Potassium   Date Value Ref Range Status   11/07/2023 4.0 3.5 - 5.2 mmol/L Final   11/06/2023 4.0 3.5 - 5.2 mmol/L Final   11/06/2023 5.1 3.5 - 5.2 mmol/L Final   11/06/2023 5.2 3.5 - 5.2 mmol/L Final   11/06/2023 4.2 3.5 - 5.2 mmol/L Final     Comment:     Specimen hemolyzed.  Results may be affected.   11/05/2023 4.2 3.5 - 5.2 mmol/L Final      Chloride Chloride   Date Value Ref Range Status   11/07/2023 106 98 - 107 mmol/L Final   11/06/2023 104 98 - 107 mmol/L Final   11/06/2023 103 98 - 107 mmol/L Final   11/06/2023 103 98 - 107 mmol/L Final   11/06/2023 101 98 - 107 mmol/L Final    11/05/2023 99 98 - 107 mmol/L Final      Bicarbonate CO2   Date Value Ref Range Status   11/07/2023 20.0 (L) 22.0 - 29.0 mmol/L Final   11/06/2023 20.1 (L) 22.0 - 29.0 mmol/L Final   11/06/2023 15.0 (L) 22.0 - 29.0 mmol/L Final   11/06/2023 14.0 (L) 22.0 - 29.0 mmol/L Final   11/06/2023 18.7 (L) 22.0 - 29.0 mmol/L Final   11/05/2023 18.4 (L) 22.0 - 29.0 mmol/L Final      BUN BUN   Date Value Ref Range Status   11/07/2023 30 (H) 6 - 20 mg/dL Final   11/06/2023 24 (H) 6 - 20 mg/dL Final   11/06/2023 26 (H) 6 - 20 mg/dL Final   11/06/2023 26 (H) 6 - 20 mg/dL Final   11/06/2023 18 6 - 20 mg/dL Final   11/05/2023 30 (H) 6 - 20 mg/dL Final      Creatinine Creatinine   Date Value Ref Range Status   11/07/2023 4.75 (H) 0.57 - 1.00 mg/dL Final   11/06/2023 4.27 (H) 0.57 - 1.00 mg/dL Final   11/06/2023 4.78 (H) 0.57 - 1.00 mg/dL Final   11/06/2023 4.72 (H) 0.57 - 1.00 mg/dL Final   11/06/2023 3.96 (H) 0.57 - 1.00 mg/dL Final   11/05/2023 5.89 (H) 0.57 - 1.00 mg/dL Final      Calcium Calcium   Date Value Ref Range Status   11/07/2023 8.3 (L) 8.6 - 10.5 mg/dL Final   11/06/2023 7.9 (L) 8.6 - 10.5 mg/dL Final   11/06/2023 10.1 8.6 - 10.5 mg/dL Final   11/06/2023 10.2 8.6 - 10.5 mg/dL Final   11/06/2023 8.9 8.6 - 10.5 mg/dL Final   11/05/2023 9.2 8.6 - 10.5 mg/dL Final      Magnesium Magnesium   Date Value Ref Range Status   11/07/2023 1.9 1.6 - 2.6 mg/dL Final          amLODIPine, 5 mg, Oral, Q24H  [Held by provider] aspirin, 81 mg, Oral, Daily  atorvastatin, 40 mg, Oral, Nightly  carvedilol, 12.5 mg, Oral, Q12H  chlorhexidine, 15 mL, Mouth/Throat, Q12H  [Held by provider] heparin (porcine), 5,000 Units, Subcutaneous, Q8H  Lacosamide, 50 mg, Intravenous, Q12H  levETIRAcetam, 500 mg, Intravenous, Q12H  mupirocin, , Each Nare, BID  pantoprazole, 40 mg, Oral, QAM  pantoprazole, 40 mg, Intravenous, Once  senna-docusate sodium, 2 tablet, Oral, Nightly  sodium chloride, 4 mL, Nebulization, BID - RT      dexmedetomidine, 0.2-1.5  mcg/kg/hr, Last Rate: Stopped (11/07/23 6738)  EPINEPHrine, 0.02-0.3 mcg/kg/min  insulin, 0-100 Units/hr  niCARdipine, 5-15 mg/hr, Last Rate: 4 mg/hr (11/07/23 1103)  nitroglycerin, 5-200 mcg/min  norepinephrine, 0.02-0.3 mcg/kg/min, Last Rate: Stopped (11/06/23 4347)  Pharmacy Consult - Pharmacy to dose,   propofol, 5-50 mcg/kg/min, Last Rate: Stopped (11/06/23 5936)  sodium chloride, 30 mL/hr, Last Rate: 30 mL/hr (11/02/23 1400)  vasopressin, 0.02-0.1 Units/min              Dissecting ascending aortic aneurysm    Vitamin D deficiency    Thrombocytopenia    Hypertension secondary to other renal disorders    Pancytopenia    Systemic lupus erythematosus    Pericardial effusion    End stage renal disease on dialysis    Seizure disorder    Elevated liver function tests    Essential hypertension    Peritoneal dialysis catheter in place    Anemia due to chronic kidney disease, on chronic dialysis    Hyponatremia    Poor appetite    Moderate malnutrition    Chronic diastolic CHF (congestive heart failure)    Aortic valve lesion    Severe aortic valve regurgitation      Assessment & Plan    -Ascending aortic aneurysm with dissection- s/p ascending aortic dissection repair/proximal replacement, gacron interposition graft, kelli procedure; insertion of right femoral shiley- POD#5 Adam  -cardiac tamponade- reexploration for bleeding, removal of large clot compressing the RV- POD#1 [place]  - severe aortic valve insufficiency  - ESRD on PD  - Lupus--on Cellcept/plaquenil; currently held  - hx of seizures  - chronic immunosuppression  - hypertension  - chronic anemia  - TCP--chronic     Remains intubated, following commands-- hopeful to get her extubated this morning.  Plans for dialysis today  Sinus rhythm -- rate in the 80s   Contacted her rheumatologist Dr. Padgett's office to ask about how we should treat her lupus, Dr. Medina wants to hold off on cellcept and the plaquenil, awaiting a call back  Continue supportive  kristy Streeter, CAESAR  11/07/23  07:03 EST

## 2023-11-07 NOTE — PROGRESS NOTES
[facility] Follow Up    Chief Complaint: Follow up aortic aneurysm/dissection, severe AR    Interval History: Developed tamponade yesterday due to pericardial thrombus overlying her right ventricle.  Came back to the OR where clot was evacuated and she was noted to have small amount of active bleeding at the suture line that was treated.  Able to stop all pressors postoperatively and actually required IV antihypertensives overnight.  Currently off sedation and is following commands.    Objective:     Objective:  Temp:  [97.7 °F (36.5 °C)-99.6 °F (37.6 °C)] 98.8 °F (37.1 °C)  Heart Rate:  [] 79  Resp:  [20-27] 21  BP: ()/(37-89) 108/65  FiO2 (%):  [30 %-40 %] 40 %     Intake/Output Summary (Last 24 hours) at 11/7/2023 0729  Last data filed at 11/7/2023 0655  Gross per 24 hour   Intake 3801.8 ml   Output 373 ml   Net 3428.8 ml     Body mass index is 20.2 kg/m².      11/03/23  0409 11/04/23  0500 11/06/23  1340   Weight: 54.1 kg (119 lb 4.3 oz) 55.5 kg (122 lb 5.7 oz) 55 kg (121 lb 4.1 oz)     Weight change:       Physical Exam:   General : Alert, cooperative, in no acute distress.  Neuro: Alert,cooperative and oriented.  Lungs: CTAB. Normal respiratory effort and rate.  CV: Regular rate and rhythm, normal S1 and S2, no murmurs, gallops or rubs.  ABD: Soft, nontender, nondistended. Positive bowel sounds.  Extr: No edema or cyanosis, moves all extremities.    Lab Review:   Results from last 7 days   Lab Units 11/07/23  0256 11/06/23  1754 11/06/23  1312   SODIUM mmol/L 140 139 136  135*   POTASSIUM mmol/L 4.0 4.0 5.2  5.1   CHLORIDE mmol/L 106 104 103  103   CO2 mmol/L 20.0* 20.1* 14.0*  15.0*   BUN mg/dL 30* 24* 26*  26*   CREATININE mg/dL 4.75* 4.27* 4.72*  4.78*   GLUCOSE mg/dL 89 108* 66  67   CALCIUM mg/dL 8.3* 7.9* 10.2  10.1   AST (SGOT) U/L 23  --  22   ALT (SGPT) U/L <5  --  <5         Results from last 7 days   Lab Units 11/07/23  0256 11/06/23  1754   WBC 10*3/mm3  9.25 8.73   HEMOGLOBIN g/dL 8.9* 8.6*   HEMATOCRIT % 26.7* 26.0*   PLATELETS 10*3/mm3 88* 92*     Results from last 7 days   Lab Units 11/06/23  1754 11/06/23  1631   INR  1.50* 1.69*   APTT seconds 34.3 35.1     Results from last 7 days   Lab Units 11/07/23  0256 11/04/23  0345   MAGNESIUM mg/dL 1.9 2.1     Results from last 7 days   Lab Units 11/06/23  1312   TRIGLYCERIDES mg/dL 202*             I reviewed the patient's new clinical results.  I personally viewed and interpreted the patient's EKG  Current Medications:   Scheduled Meds:amLODIPine, 5 mg, Oral, Q24H  [Held by provider] aspirin, 81 mg, Oral, Daily  atorvastatin, 40 mg, Oral, Nightly  carvedilol, 12.5 mg, Oral, Q12H  chlorhexidine, 15 mL, Mouth/Throat, Q12H  [Held by provider] heparin (porcine), 5,000 Units, Subcutaneous, Q8H  Lacosamide, 50 mg, Intravenous, Q12H  levETIRAcetam, 500 mg, Intravenous, Q12H  mupirocin, , Each Nare, BID  pantoprazole, 40 mg, Oral, QAM  senna-docusate sodium, 2 tablet, Oral, Nightly  sodium chloride, 4 mL, Nebulization, BID - RT      Continuous Infusions:dexmedetomidine, 0.2-1.5 mcg/kg/hr, Last Rate: Stopped (11/07/23 0555)  EPINEPHrine, 0.02-0.3 mcg/kg/min  insulin, 0-100 Units/hr  niCARdipine, 5-15 mg/hr, Last Rate: Stopped (11/07/23 0658)  nitroglycerin, 5-200 mcg/min  norepinephrine, 0.02-0.3 mcg/kg/min, Last Rate: Stopped (11/06/23 1607)  Pharmacy Consult - Pharmacy to dose,   propofol, 5-50 mcg/kg/min, Last Rate: Stopped (11/06/23 3169)  sodium chloride, 30 mL/hr, Last Rate: 30 mL/hr (11/02/23 1400)  vasopressin, 0.02-0.1 Units/min        Allergies:  Allergies   Allergen Reactions    Minoxidil Other (See Comments) and Hives     Pericardial effusion .       Assessment/Plan:     Cardiac tamponade.  Due to pericardial thrombus anteriorly and compressing right ventricle.  Underwent reexploration with clot removal and treatment of a small amount of bleeding at the suture line on 11/6.  Cardiogenic shock.  Due to #1.   Resolved and actually required IV antihypertensives overnight.  Ascending aortic aneurysm with subacute/chronic aortic root dissection.  Status post repair and proximal aortic replacement on 11/2 .  Severe aortic valve regurgitation.  Status post repair on 11/2.  Seizures.  Started postoperatively.  Currently appears to be doing from this respect on medical therapy.    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  On peritoneal dialysis normally.  Has been on hemodialysis postoperatively.    Hyponatremia.  Resolved.  Hypertension.  Initially hypertensive following surgery.  On clevidipine back on oral antihypertensives.  Chronic anemia. Stable postoperatively.  Systemic lupus erythematosus.  Quinnell and mycophenolate on hold for surgery.  Thrombocytopenia.  Stable.    -Plans for dialysis later today noted.  - Hopefully she can be extubated later today following dialysis.  - Continue supportive care.      Juana Taylor MD  11/07/23  07:29 EST

## 2023-11-07 NOTE — CASE MANAGEMENT/SOCIAL WORK
Continued Stay Note  [facility]     Patient Name: Dee Herrera  MRN: [id number]  Today's Date: 11/7/2023    Admit Date: 10/26/2023    Plan: TBD, remains on vent   Discharge Plan       Row Name 11/07/23 1659       Plan    Plan TBD, remains on vent    Plan Comments The patient had an evacuation of a pericardial clot causing tamponade last evening, 11/6/23, she is back on the ventilator.  Remains in CVR.  CCP will continue to follow........Mary./SULMA CM                   Discharge Codes    No documentation.                 Expected Discharge Date and Time       Expected Discharge Date Expected Discharge Time    Nov 10, 2023               Елена Brown RN

## 2023-11-08 ENCOUNTER — APPOINTMENT (OUTPATIENT)
Dept: GENERAL RADIOLOGY | Facility: HOSPITAL | Age: 33
DRG: 219 | End: 2023-11-08
Payer: MEDICARE

## 2023-11-08 LAB
ALBUMIN SERPL-MCNC: 3.2 G/DL (ref 3.5–5.2)
ALBUMIN/GLOB SERPL: 1.4 G/DL
ALP SERPL-CCNC: 64 U/L (ref 39–117)
ALT SERPL W P-5'-P-CCNC: <5 U/L (ref 1–33)
ANION GAP SERPL CALCULATED.3IONS-SCNC: 11 MMOL/L (ref 5–15)
ARTERIAL PATENCY WRIST A: ABNORMAL
AST SERPL-CCNC: 22 U/L (ref 1–32)
ATMOSPHERIC PRESS: 742.8 MMHG
BASE EXCESS BLDA CALC-SCNC: -1.1 MMOL/L (ref 0–2)
BASOPHILS # BLD AUTO: 0.02 10*3/MM3 (ref 0–0.2)
BASOPHILS NFR BLD AUTO: 0.2 % (ref 0–1.5)
BDY SITE: ABNORMAL
BILIRUB SERPL-MCNC: 0.4 MG/DL (ref 0–1.2)
BUN SERPL-MCNC: 18 MG/DL (ref 6–20)
BUN/CREAT SERPL: 5.6 (ref 7–25)
CALCIUM SPEC-SCNC: 8.8 MG/DL (ref 8.6–10.5)
CHLORIDE SERPL-SCNC: 103 MMOL/L (ref 98–107)
CO2 BLDA-SCNC: 23.1 MMOL/L (ref 23–27)
CO2 SERPL-SCNC: 22 MMOL/L (ref 22–29)
CREAT SERPL-MCNC: 3.24 MG/DL (ref 0.57–1)
DEPRECATED RDW RBC AUTO: 47.8 FL (ref 37–54)
DEVICE COMMENT: ABNORMAL
EGFRCR SERPLBLD CKD-EPI 2021: 18.9 ML/MIN/1.73
EOSINOPHIL # BLD AUTO: 0.02 10*3/MM3 (ref 0–0.4)
EOSINOPHIL NFR BLD AUTO: 0.2 % (ref 0.3–6.2)
ERYTHROCYTE [DISTWIDTH] IN BLOOD BY AUTOMATED COUNT: 15.7 % (ref 12.3–15.4)
GLOBULIN UR ELPH-MCNC: 2.3 GM/DL
GLUCOSE BLDC GLUCOMTR-MCNC: 111 MG/DL (ref 70–130)
GLUCOSE BLDC GLUCOMTR-MCNC: 113 MG/DL (ref 70–130)
GLUCOSE BLDC GLUCOMTR-MCNC: 116 MG/DL (ref 70–130)
GLUCOSE BLDC GLUCOMTR-MCNC: 169 MG/DL (ref 70–130)
GLUCOSE BLDC GLUCOMTR-MCNC: 74 MG/DL (ref 70–130)
GLUCOSE BLDC GLUCOMTR-MCNC: 96 MG/DL (ref 70–130)
GLUCOSE SERPL-MCNC: 124 MG/DL (ref 65–99)
HCO3 BLDA-SCNC: 22.1 MMOL/L (ref 22–28)
HCT VFR BLD AUTO: 30 % (ref 34–46.6)
HEMODILUTION: NO
HGB BLD-MCNC: 10 G/DL (ref 12–15.9)
IMM GRANULOCYTES # BLD AUTO: 0.19 10*3/MM3 (ref 0–0.05)
IMM GRANULOCYTES NFR BLD AUTO: 1.8 % (ref 0–0.5)
INHALED O2 CONCENTRATION: 40 %
LYMPHOCYTES # BLD AUTO: 0.36 10*3/MM3 (ref 0.7–3.1)
LYMPHOCYTES NFR BLD AUTO: 3.4 % (ref 19.6–45.3)
MAGNESIUM SERPL-MCNC: 1.8 MG/DL (ref 1.6–2.6)
MCH RBC QN AUTO: 28 PG (ref 26.6–33)
MCHC RBC AUTO-ENTMCNC: 33.3 G/DL (ref 31.5–35.7)
MCV RBC AUTO: 84 FL (ref 79–97)
MODALITY: ABNORMAL
MONOCYTES # BLD AUTO: 0.8 10*3/MM3 (ref 0.1–0.9)
MONOCYTES NFR BLD AUTO: 7.5 % (ref 5–12)
NEUTROPHILS NFR BLD AUTO: 86.9 % (ref 42.7–76)
NEUTROPHILS NFR BLD AUTO: 9.33 10*3/MM3 (ref 1.7–7)
NRBC BLD AUTO-RTO: 0 /100 WBC (ref 0–0.2)
O2 A-A PPRESDIFF RESPIRATORY: 0.5 MMHG
PCO2 BLDA: 30.9 MM HG (ref 35–45)
PEEP RESPIRATORY: 5 CM[H2O]
PH BLDA: 7.46 PH UNITS (ref 7.35–7.45)
PHOSPHATE SERPL-MCNC: 3.1 MG/DL (ref 2.5–4.5)
PLATELET # BLD AUTO: 112 10*3/MM3 (ref 140–450)
PMV BLD AUTO: 12.9 FL (ref 6–12)
PO2 BLDA: 141.5 MM HG (ref 80–100)
POTASSIUM SERPL-SCNC: 3.6 MMOL/L (ref 3.5–5.2)
PROT SERPL-MCNC: 5.5 G/DL (ref 6–8.5)
PSV: 6 CMH2O
RBC # BLD AUTO: 3.57 10*6/MM3 (ref 3.77–5.28)
SAO2 % BLDCOA: 99.4 % (ref 92–98.5)
SODIUM SERPL-SCNC: 136 MMOL/L (ref 136–145)
TOTAL RATE: 20 BREATHS/MINUTE
VENTILATOR MODE: ABNORMAL
WBC NRBC COR # BLD: 10.72 10*3/MM3 (ref 3.4–10.8)

## 2023-11-08 PROCEDURE — 94799 UNLISTED PULMONARY SVC/PX: CPT

## 2023-11-08 PROCEDURE — 85025 COMPLETE CBC W/AUTO DIFF WBC: CPT | Performed by: INTERNAL MEDICINE

## 2023-11-08 PROCEDURE — 94760 N-INVAS EAR/PLS OXIMETRY 1: CPT

## 2023-11-08 PROCEDURE — 25810000003 SODIUM CHLORIDE 0.9 % SOLUTION 250 ML FLEX CONT

## 2023-11-08 PROCEDURE — 83735 ASSAY OF MAGNESIUM: CPT | Performed by: INTERNAL MEDICINE

## 2023-11-08 PROCEDURE — 82803 BLOOD GASES ANY COMBINATION: CPT

## 2023-11-08 PROCEDURE — 94003 VENT MGMT INPAT SUBQ DAY: CPT

## 2023-11-08 PROCEDURE — 25010000002 HYDRALAZINE PER 20 MG

## 2023-11-08 PROCEDURE — 80053 COMPREHEN METABOLIC PANEL: CPT | Performed by: INTERNAL MEDICINE

## 2023-11-08 PROCEDURE — 82948 REAGENT STRIP/BLOOD GLUCOSE: CPT

## 2023-11-08 PROCEDURE — 71045 X-RAY EXAM CHEST 1 VIEW: CPT

## 2023-11-08 PROCEDURE — 99232 SBSQ HOSP IP/OBS MODERATE 35: CPT | Performed by: INTERNAL MEDICINE

## 2023-11-08 PROCEDURE — 25010000002 MORPHINE PER 10 MG

## 2023-11-08 PROCEDURE — 74018 RADEX ABDOMEN 1 VIEW: CPT

## 2023-11-08 PROCEDURE — 99024 POSTOP FOLLOW-UP VISIT: CPT | Performed by: THORACIC SURGERY (CARDIOTHORACIC VASCULAR SURGERY)

## 2023-11-08 PROCEDURE — 25010000002 LEVETRIRACETAM PER 10 MG

## 2023-11-08 PROCEDURE — 84100 ASSAY OF PHOSPHORUS: CPT | Performed by: INTERNAL MEDICINE

## 2023-11-08 PROCEDURE — 94664 DEMO&/EVAL PT USE INHALER: CPT

## 2023-11-08 PROCEDURE — 94761 N-INVAS EAR/PLS OXIMETRY MLT: CPT

## 2023-11-08 PROCEDURE — 25010000002 LACOSAMIDE 200 MG/20ML SOLUTION

## 2023-11-08 PROCEDURE — C9254 INJECTION, LACOSAMIDE: HCPCS

## 2023-11-08 RX ORDER — DIPHENHYDRAMINE HYDROCHLORIDE AND LIDOCAINE HYDROCHLORIDE AND ALUMINUM HYDROXIDE AND MAGNESIUM HYDRO
10 KIT EVERY 6 HOURS
Status: DISCONTINUED | OUTPATIENT
Start: 2023-11-08 | End: 2023-12-07

## 2023-11-08 RX ORDER — CARVEDILOL 25 MG/1
25 TABLET ORAL EVERY 12 HOURS SCHEDULED
Status: DISCONTINUED | OUTPATIENT
Start: 2023-11-08 | End: 2023-11-18

## 2023-11-08 RX ORDER — MANNITOL 250 MG/ML
12.5 INJECTION, SOLUTION INTRAVENOUS AS NEEDED
Status: CANCELLED | OUTPATIENT
Start: 2023-11-09 | End: 2023-11-09

## 2023-11-08 RX ORDER — AMLODIPINE BESYLATE 10 MG/1
10 TABLET ORAL
Status: DISCONTINUED | OUTPATIENT
Start: 2023-11-08 | End: 2023-11-17

## 2023-11-08 RX ADMIN — IPRATROPIUM BROMIDE AND ALBUTEROL SULFATE 3 ML: 2.5; .5 SOLUTION RESPIRATORY (INHALATION) at 20:37

## 2023-11-08 RX ADMIN — BISACODYL 10 MG: 5 TABLET, COATED ORAL at 20:52

## 2023-11-08 RX ADMIN — DIPHENHYDRAMINE HYDROCHLORIDE AND LIDOCAINE HYDROCHLORIDE AND ALUMINUM HYDROXIDE AND MAGNESIUM HYDRO 10 ML: KIT at 17:51

## 2023-11-08 RX ADMIN — MUPIROCIN 1 APPLICATION: 20 OINTMENT TOPICAL at 09:34

## 2023-11-08 RX ADMIN — LEVETIRACETAM 500 MG: 500 INJECTION, SOLUTION INTRAVENOUS at 09:05

## 2023-11-08 RX ADMIN — OXYCODONE HYDROCHLORIDE 10 MG: 5 TABLET ORAL at 13:10

## 2023-11-08 RX ADMIN — HYDRALAZINE HYDROCHLORIDE 20 MG: 20 INJECTION INTRAMUSCULAR; INTRAVENOUS at 04:28

## 2023-11-08 RX ADMIN — Medication 4 ML: at 20:40

## 2023-11-08 RX ADMIN — CARVEDILOL 12.5 MG: 12.5 TABLET, FILM COATED ORAL at 09:04

## 2023-11-08 RX ADMIN — ACETAMINOPHEN 650 MG: 160 SOLUTION ORAL at 18:22

## 2023-11-08 RX ADMIN — ACETAMINOPHEN 650 MG: 160 SOLUTION ORAL at 10:34

## 2023-11-08 RX ADMIN — 0.12% CHLORHEXIDINE GLUCONATE 15 ML: 1.2 RINSE ORAL at 09:34

## 2023-11-08 RX ADMIN — BISACODYL 10 MG: 5 TABLET, COATED ORAL at 02:06

## 2023-11-08 RX ADMIN — SENNOSIDES AND DOCUSATE SODIUM 2 TABLET: 50; 8.6 TABLET ORAL at 20:52

## 2023-11-08 RX ADMIN — LEVETIRACETAM 500 MG: 500 INJECTION, SOLUTION INTRAVENOUS at 20:52

## 2023-11-08 RX ADMIN — OXYCODONE HYDROCHLORIDE 10 MG: 5 TABLET ORAL at 18:22

## 2023-11-08 RX ADMIN — OXYCODONE HYDROCHLORIDE 5 MG: 5 TABLET ORAL at 09:05

## 2023-11-08 RX ADMIN — MORPHINE SULFATE 1 MG: 2 INJECTION, SOLUTION INTRAMUSCULAR; INTRAVENOUS at 11:32

## 2023-11-08 RX ADMIN — 0.12% CHLORHEXIDINE GLUCONATE 15 ML: 1.2 RINSE ORAL at 20:52

## 2023-11-08 RX ADMIN — CARVEDILOL 25 MG: 25 TABLET, FILM COATED ORAL at 20:52

## 2023-11-08 RX ADMIN — IPRATROPIUM BROMIDE AND ALBUTEROL SULFATE 3 ML: 2.5; .5 SOLUTION RESPIRATORY (INHALATION) at 07:13

## 2023-11-08 RX ADMIN — MUPIROCIN 1 APPLICATION: 20 OINTMENT TOPICAL at 20:52

## 2023-11-08 RX ADMIN — LACOSAMIDE 50 MG: 10 INJECTION INTRAVENOUS at 00:27

## 2023-11-08 RX ADMIN — DIPHENHYDRAMINE HYDROCHLORIDE AND LIDOCAINE HYDROCHLORIDE AND ALUMINUM HYDROXIDE AND MAGNESIUM HYDRO 10 ML: KIT at 12:15

## 2023-11-08 RX ADMIN — HYDRALAZINE HYDROCHLORIDE 20 MG: 20 INJECTION INTRAMUSCULAR; INTRAVENOUS at 01:28

## 2023-11-08 RX ADMIN — LACOSAMIDE 50 MG: 10 INJECTION INTRAVENOUS at 12:55

## 2023-11-08 RX ADMIN — ATORVASTATIN CALCIUM 40 MG: 20 TABLET, FILM COATED ORAL at 20:52

## 2023-11-08 RX ADMIN — AMLODIPINE BESYLATE 10 MG: 10 TABLET ORAL at 10:31

## 2023-11-08 RX ADMIN — DIPHENHYDRAMINE HYDROCHLORIDE AND LIDOCAINE HYDROCHLORIDE AND ALUMINUM HYDROXIDE AND MAGNESIUM HYDRO 10 ML: KIT at 20:53

## 2023-11-08 RX ADMIN — DEXTROSE MONOHYDRATE 25 G: 25 INJECTION, SOLUTION INTRAVENOUS at 00:20

## 2023-11-08 RX ADMIN — Medication 4 ML: at 07:14

## 2023-11-08 RX ADMIN — ALPRAZOLAM 0.25 MG: 0.25 TABLET ORAL at 13:10

## 2023-11-08 NOTE — PROGRESS NOTES
"Nutrition Services    Patient Name:  Dee Herrera  YOB: 1990  MRN: [id number]  Admit Date:  10/26/2023    Assessment Date:  11/08/23    Summary: Cortrak placement/TF assessment:  Placed cortrak at bedside @ 105 cm (ND) and bridled in place. Left OGT in place for gastric decompression if needed. Pt was agitated at first and RN gave one time dose of morphine and small dose of Propofol and pt tolerated placement much better.   Noted trickle feeds started overnight via OGT but stopped after RN reported emesis.   Pt NPO x 8 days and had poor po intake when on po diet 4 days before that.   Last BM 11/1  Intubated/ventilated. Sedation off (except one time dose for cortrak placement).   Meds reviewed, IVF@30ml/hr, cardene gtt, precedex off, pericolace, dulcolax prn (given 11/8)  Labs reviewed, Hgb 10.0 (s/p 6u PRBC 11/7), BUN 18, Crea 3.24, , Alb 3.2, Gluc 113/116/96  KUB 11/7-no evidence of bowel obstruction, moderate amount of bowel gas in colon  HD 11/7  Failed SBT last night, will re-attempt later this afternoon per MD notes.   Recommend:  Start trickle feeds via ND tube today of Novasource Renal @ 10 ml/hr, do not advance today with 30 ml q 4 hrs free water flushes.   Bowel regimen to promote BM.    RD to follow closely.    CLINICAL NUTRITION ASSESSMENT      Reason for Assessment Cortrak Placement, Tube Feeding Assessment      Diagnosis/Problem SOA, hyponatremia, ESRD on dialysis, abd pain     Anthropometrics        Current Height  Current Weight  BMI kg/m2 Height: 165 cm (64.96\")  Weight: 59.1 kg (130 lb 4.7 oz) (11/08/23 0600)  Body mass index is 21.71 kg/m².   Adjusted BMI (if applicable)    BMI Category Normal/Healthy (18.4 - 24.9)   Ideal Body Weight (IBW) 125lb   Usual Body Weight (UBW) 120-155   Weight Trend Loss, 5lbs recently, 35lb overall   --  Estimated/Assessed Needs        Current Weight  Weight: 55 kg (121 lb 4.1 oz) (11/06/23 1340)       Energy Requirements    Weight for " Calculation 52.2 kg   Method for Estimation  30-35 kcal/kg   EST Needs (kcal/day) 7607-4943       Protein Requirements    Weight for Calculation 52.2 kg   EST Protein Needs (g/kg) 1.0 gm/kg, 1.5 gm/kg   EST Daily Needs (g/day) 52-78       Fluid Requirements     Method for Estimation 1 mL/kcal    EST Needs (mL/day)      Labs       Pertinent Labs    Results from last 7 days   Lab Units 11/08/23  0258 11/07/23  0256 11/06/23  1754 11/06/23  1312   SODIUM mmol/L 136 140 139 136  135*   POTASSIUM mmol/L 3.6 4.0 4.0 5.2  5.1   CHLORIDE mmol/L 103 106 104 103  103   CO2 mmol/L 22.0 20.0* 20.1* 14.0*  15.0*   BUN mg/dL 18 30* 24* 26*  26*   CREATININE mg/dL 3.24* 4.75* 4.27* 4.72*  4.78*   CALCIUM mg/dL 8.8 8.3* 7.9* 10.2  10.1   BILIRUBIN mg/dL 0.4 0.3  --  0.3   ALK PHOS U/L 64 50  --  65   ALT (SGPT) U/L <5 <5  --  <5   AST (SGOT) U/L 22 23  --  22   GLUCOSE mg/dL 124* 89 108* 66  67     Results from last 7 days   Lab Units 11/08/23  0258 11/07/23  0256 11/06/23  1607 11/06/23  1312 11/05/23  0301 11/04/23  0345   MAGNESIUM mg/dL 1.8 1.9  --   --   --  2.1   PHOSPHORUS mg/dL 3.1 5.2*   < > 5.6*   < > 4.0   HEMOGLOBIN g/dL 10.0* 8.9*   < > 8.9*   < > 8.7*   HEMOGLOBIN, POC   --   --    < >  --   --   --    HEMATOCRIT % 30.0* 26.7*   < > 28.5*   < > 27.0*   HEMATOCRIT POC   --   --    < >  --   --   --    WBC 10*3/mm3 10.72 9.25   < > 13.01*   < > 10.11   TRIGLYCERIDES mg/dL  --   --   --  202*  --   --    ALBUMIN g/dL 3.2* 2.8*   < > 2.9*  2.9*  --  3.1*    < > = values in this interval not displayed.     Results from last 7 days   Lab Units 11/08/23  0258 11/07/23  0256 11/06/23  1754 11/06/23  1736 11/06/23  1631 11/06/23  1312 11/04/23  0345 11/03/23  0407 11/02/23  1736 11/02/23  1412 11/02/23  1313 11/02/23  1251 11/02/23  1131 11/02/23  1115   INR   --   --  1.50* 1.6* 1.69*  --   --  1.20*  --  1.62*   < > 1.70*   < > 2.21*   APTT seconds  --   --  34.3  --  35.1  --   --   --   --  33.7  --  33.6  --   34.3   PLATELETS 10*3/mm3 112* 88* 92*  --  123* 110*   < > 172   < > 162  --  117*  --  38*    < > = values in this interval not displayed.     COVID19   Date Value Ref Range Status   10/31/2023 Not Detected Not Detected - Ref. Range Final     Lab Results   Component Value Date    HGBA1C 5.10 10/30/2023          Medications           Scheduled Medications amLODIPine, 10 mg, Oral, Q24H  [Held by provider] aspirin, 81 mg, Oral, Daily  atorvastatin, 40 mg, Oral, Nightly  carvedilol, 25 mg, Oral, Q12H  chlorhexidine, 15 mL, Mouth/Throat, Q12H  First Mouthwash (Magic Mouthwash), 10 mL, Swish & Spit, Q6H  [Held by provider] heparin (porcine), 5,000 Units, Subcutaneous, Q8H  insulin regular, 2-7 Units, Subcutaneous, Q6H  Lacosamide, 50 mg, Intravenous, Q12H  levETIRAcetam, 500 mg, Intravenous, Q12H  mupirocin, , Each Nare, BID  pantoprazole, 40 mg, Oral, QAM  senna-docusate sodium, 2 tablet, Oral, Nightly  sodium chloride, 4 mL, Nebulization, BID - RT       Infusions dexmedetomidine, 0.2-1.5 mcg/kg/hr, Last Rate: Stopped (11/07/23 0555)  EPINEPHrine, 0.02-0.3 mcg/kg/min  insulin, 0-100 Units/hr  niCARdipine, 5-15 mg/hr, Last Rate: 5 mg/hr (11/08/23 0703)  nitroglycerin, 5-200 mcg/min  norepinephrine, 0.02-0.3 mcg/kg/min, Last Rate: Stopped (11/06/23 1607)  Pharmacy Consult - Pharmacy to dose,   propofol, 5-50 mcg/kg/min, Last Rate: Stopped (11/06/23 2143)  sodium chloride, 30 mL/hr, Last Rate: 30 mL/hr (11/02/23 1400)  vasopressin, 0.02-0.1 Units/min       PRN Medications   acetaminophen **OR** acetaminophen **OR** acetaminophen    ALPRAZolam    bisacodyl    bisacodyl    cyclobenzaprine    dextrose    dextrose    dextrose    dextrose    EPINEPHrine    glucagon (human recombinant)    heparin (porcine)    hydrALAZINE    HYDROcodone-acetaminophen    ipratropium-albuterol    LORazepam    mannitol    Morphine **AND** naloxone    nitroglycerin    ondansetron    oxyCODONE    Pharmacy Consult - Pharmacy to dose    polyethylene  glycol    Potassium Replacement - Follow Nurse / BPA Driven Protocol    propofol    vasopressin     Physical Findings          General Findings somnolent, ventilator support   Oral/Mouth Cavity other:tongue swelling, lesions   Edema  1+ (trace)   Gastrointestinal normoactive, last bowel movement: 11/1   Skin  surgical incision: sternal   Tubes/Drains/Lines none, chest tube, Cortrak, dialysis catheter, ND tube, OG tube, bridle in place @105 cm   NFPE See Malnutrition Severity Assessment   --  Malnutrition Severity Assessment      Patient meets criteria for : Moderate (non-severe) Malnutrition       Current Nutrition Orders & Evaluation of Intake       Oral Nutrition     Food Allergies NKFA   Current PO Diet NPO Diet NPO Type: Strict NPO   Supplement n/a   PO Evaluation     % PO Intake N/a    Factors Affecting Intake: altered respiratory status   --  PES STATEMENT / NUTRITION DIAGNOSIS      Nutrition Dx Problem  Problem: Unintentional Weight Loss, Malnutrition (moderate), and Inadequate Oral Intake  Etiology: Factors Affecting Nutrition - decrease appetite, abd pain    Signs/Symptoms: PO intake, NFPE Results, Unintended Weight Change, and Report/Observation     NUTRITION INTERVENTION / PLAN OF CARE      Intervention Goal(s) Maintain nutrition status, Reduce/improve symptoms, Meet estimated needs, Disease management/therapy, Initiate TF/PN, and Maintain weight         RD Intervention/Action Await initiation of EN/PN, Continue to monitor, Care plan reviewed, and Recommend/order: EN   --      Prescription/Orders:       PO Diet       Supplements       Enteral Nutrition    Enteral Prescription:     Enteral Route ND    TF Delivery Method Continuous    Enteral Product Novasource Renal    Modular None    Propofol Rate/Kcal     TF Start Rate  10 mL/hr x 24 hrs, do not advance today    TF Goal Rate  35 mL/hr    Free Water Flush 30 mL Q 4 hr    Provision at Goal:          Calories 1680 kcal, meets 100 % needs         Protein  76  gm protein, meets 100 % needs         Fluid (mL) 605 mL free water + 180 mL in flushes    Prescription Ordered No, recommended         Parenteral Nutrition    New Prescription Ordered? No, recommended   --      Monitor/Evaluation Per protocol, I&O, Pertinent labs, EN delivery/tolerance, Weight, GI status, Symptoms, Swallow function, Hemodynamic stability   Discharge Plan/Needs Pending clinical course   --    RD to follow per protocol.      Electronically signed by:  Crystal Edwards RD  11/08/23 16:21 EST

## 2023-11-08 NOTE — PROGRESS NOTES
" LOS: 13 days   Patient Care Team:  Margarita Woods APRN as PCP - General (Nurse Practitioner)  Winnie Sanchez MD as Referring Physician (Obstetrics and Gynecology)  Norberto Almaraz MD PhD as Consulting Physician (Hematology and Oncology)  Lupis Thomas MD as Consulting Physician (Nephrology)  Hair Mckeon MD as Consulting Physician (Nephrology)  Juana Taylor MD as Consulting Physician (Cardiology)    Chief Complaint: post op    Subjective      Vital Signs  Temp:  [98.1 °F (36.7 °C)-98.5 °F (36.9 °C)] 98.1 °F (36.7 °C)  Heart Rate:  [] 94  Resp:  [20] 20  BP: ()/(65-88) 117/72  FiO2 (%):  [40 %-98 %] 40 %  Body mass index is 21.71 kg/m².    Intake/Output Summary (Last 24 hours) at 11/8/2023 0714  Last data filed at 11/8/2023 0500  Gross per 24 hour   Intake 718 ml   Output 2825 ml   Net -2107 ml     No intake/output data recorded.    Chest tube drainage last 8 hours 58/25        11/06/23  1340 11/07/23  1400 11/08/23  0600   Weight: 55 kg (121 lb 4.1 oz) 60.1 kg (132 lb 7.9 oz) 59.1 kg (130 lb 4.7 oz)         Objective:  Vital signs: (most recent): Blood pressure 118/81, pulse 98, temperature 98.1 °F (36.7 °C), temperature source Oral, resp. rate 23, height 165 cm (64.96\"), weight 59.1 kg (130 lb 4.7 oz), last menstrual period 08/10/2022, SpO2 96%, not currently breastfeeding.                Results Review:        WBC WBC   Date Value Ref Range Status   11/08/2023 10.72 3.40 - 10.80 10*3/mm3 Final   11/07/2023 9.25 3.40 - 10.80 10*3/mm3 Final   11/06/2023 8.73 3.40 - 10.80 10*3/mm3 Final   11/06/2023 7.86 3.40 - 10.80 10*3/mm3 Final   11/06/2023 13.01 (H) 3.40 - 10.80 10*3/mm3 Final   11/06/2023 12.30 (H) 3.40 - 10.80 10*3/mm3 Final      HGB Hemoglobin   Date Value Ref Range Status   11/08/2023 10.0 (L) 12.0 - 15.9 g/dL Final   11/07/2023 8.9 (L) 12.0 - 15.9 g/dL Final   11/06/2023 8.6 (L) 12.0 - 15.9 g/dL Final   11/06/2023 4.9 (C) 12.0 - 15.9 g/dL Final   11/06/2023 6.5 (C) " 12.0 - 17.0 g/dL Final   11/06/2023 8.9 (L) 12.0 - 15.9 g/dL Final   11/06/2023 11.8 (L) 12.0 - 15.9 g/dL Final      HCT Hematocrit   Date Value Ref Range Status   11/08/2023 30.0 (L) 34.0 - 46.6 % Final   11/07/2023 26.7 (L) 34.0 - 46.6 % Final   11/06/2023 26.0 (L) 34.0 - 46.6 % Final   11/06/2023 15.4 (C) 34.0 - 46.6 % Final   11/06/2023 19 (L) 38 - 51 % Final   11/06/2023 28.5 (L) 34.0 - 46.6 % Final   11/06/2023 34.5 34.0 - 46.6 % Final      Platelets Platelets   Date Value Ref Range Status   11/08/2023 112 (L) 140 - 450 10*3/mm3 Final   11/07/2023 88 (L) 140 - 450 10*3/mm3 Final   11/06/2023 92 (L) 140 - 450 10*3/mm3 Final   11/06/2023 123 (L) 140 - 450 10*3/mm3 Final   11/06/2023 110 (L) 140 - 450 10*3/mm3 Final   11/06/2023 94 (L) 140 - 450 10*3/mm3 Final        PT/INR:    Protime   Date Value Ref Range Status   11/06/2023 18.4 (H) 11.7 - 14.2 Seconds Final   11/06/2023 18.6 (H) 12.8 - 15.2 seconds Final     Comment:     Serial Number: [id number]:  [id number]   11/06/2023 20.1 (H) 11.7 - 14.2 Seconds Final   /  INR   Date Value Ref Range Status   11/06/2023 1.50 (H) 0.90 - 1.10 Final   11/06/2023 1.6 (H) 0.8 - 1.2 Final   11/06/2023 1.69 (H) 0.90 - 1.10 Final       Sodium Sodium   Date Value Ref Range Status   11/08/2023 136 136 - 145 mmol/L Final   11/07/2023 140 136 - 145 mmol/L Final   11/06/2023 139 136 - 145 mmol/L Final   11/06/2023 135 (L) 136 - 145 mmol/L Final   11/06/2023 136 136 - 145 mmol/L Final   11/06/2023 136 136 - 145 mmol/L Final      Potassium Potassium   Date Value Ref Range Status   11/08/2023 3.6 3.5 - 5.2 mmol/L Final   11/07/2023 4.0 3.5 - 5.2 mmol/L Final   11/06/2023 4.0 3.5 - 5.2 mmol/L Final   11/06/2023 5.1 3.5 - 5.2 mmol/L Final   11/06/2023 5.2 3.5 - 5.2 mmol/L Final   11/06/2023 4.2 3.5 - 5.2 mmol/L Final     Comment:     Specimen hemolyzed.  Results may be affected.      Chloride Chloride   Date Value Ref Range Status   11/08/2023 103 98 - 107 mmol/L Final   11/07/2023 106 98 -  107 mmol/L Final   11/06/2023 104 98 - 107 mmol/L Final   11/06/2023 103 98 - 107 mmol/L Final   11/06/2023 103 98 - 107 mmol/L Final   11/06/2023 101 98 - 107 mmol/L Final      Bicarbonate CO2   Date Value Ref Range Status   11/08/2023 22.0 22.0 - 29.0 mmol/L Final   11/07/2023 20.0 (L) 22.0 - 29.0 mmol/L Final   11/06/2023 20.1 (L) 22.0 - 29.0 mmol/L Final   11/06/2023 15.0 (L) 22.0 - 29.0 mmol/L Final   11/06/2023 14.0 (L) 22.0 - 29.0 mmol/L Final   11/06/2023 18.7 (L) 22.0 - 29.0 mmol/L Final      BUN BUN   Date Value Ref Range Status   11/08/2023 18 6 - 20 mg/dL Final   11/07/2023 30 (H) 6 - 20 mg/dL Final   11/06/2023 24 (H) 6 - 20 mg/dL Final   11/06/2023 26 (H) 6 - 20 mg/dL Final   11/06/2023 26 (H) 6 - 20 mg/dL Final   11/06/2023 18 6 - 20 mg/dL Final      Creatinine Creatinine   Date Value Ref Range Status   11/08/2023 3.24 (H) 0.57 - 1.00 mg/dL Final   11/07/2023 4.75 (H) 0.57 - 1.00 mg/dL Final   11/06/2023 4.27 (H) 0.57 - 1.00 mg/dL Final   11/06/2023 4.78 (H) 0.57 - 1.00 mg/dL Final   11/06/2023 4.72 (H) 0.57 - 1.00 mg/dL Final   11/06/2023 3.96 (H) 0.57 - 1.00 mg/dL Final      Calcium Calcium   Date Value Ref Range Status   11/08/2023 8.8 8.6 - 10.5 mg/dL Final   11/07/2023 8.3 (L) 8.6 - 10.5 mg/dL Final   11/06/2023 7.9 (L) 8.6 - 10.5 mg/dL Final   11/06/2023 10.1 8.6 - 10.5 mg/dL Final   11/06/2023 10.2 8.6 - 10.5 mg/dL Final   11/06/2023 8.9 8.6 - 10.5 mg/dL Final      Magnesium Magnesium   Date Value Ref Range Status   11/08/2023 1.8 1.6 - 2.6 mg/dL Final   11/07/2023 1.9 1.6 - 2.6 mg/dL Final          amLODIPine, 5 mg, Oral, Q24H  [Held by provider] aspirin, 81 mg, Oral, Daily  atorvastatin, 40 mg, Oral, Nightly  carvedilol, 12.5 mg, Oral, Q12H  chlorhexidine, 15 mL, Mouth/Throat, Q12H  [Held by provider] heparin (porcine), 5,000 Units, Subcutaneous, Q8H  insulin regular, 2-7 Units, Subcutaneous, Q6H  Lacosamide, 50 mg, Intravenous, Q12H  levETIRAcetam, 500 mg, Intravenous, Q12H  mupirocin, ,  Each Nare, BID  pantoprazole, 40 mg, Oral, QAM  senna-docusate sodium, 2 tablet, Oral, Nightly  sodium chloride, 4 mL, Nebulization, BID - RT      dexmedetomidine, 0.2-1.5 mcg/kg/hr, Last Rate: Stopped (11/07/23 0555)  EPINEPHrine, 0.02-0.3 mcg/kg/min  insulin, 0-100 Units/hr  niCARdipine, 5-15 mg/hr, Last Rate: 5 mg/hr (11/08/23 0703)  nitroglycerin, 5-200 mcg/min  norepinephrine, 0.02-0.3 mcg/kg/min, Last Rate: Stopped (11/06/23 1607)  Pharmacy Consult - Pharmacy to dose,   propofol, 5-50 mcg/kg/min, Last Rate: Stopped (11/06/23 1026)  sodium chloride, 30 mL/hr, Last Rate: 30 mL/hr (11/02/23 1400)  vasopressin, 0.02-0.1 Units/min              Dissecting ascending aortic aneurysm    Vitamin D deficiency    Thrombocytopenia    Hypertension secondary to other renal disorders    Pancytopenia    Systemic lupus erythematosus    Pericardial effusion    End stage renal disease on dialysis    Seizure disorder    Elevated liver function tests    Essential hypertension    Peritoneal dialysis catheter in place    Anemia due to chronic kidney disease, on chronic dialysis    Hyponatremia    Poor appetite    Moderate malnutrition    Chronic diastolic CHF (congestive heart failure)    Aortic valve lesion    Severe aortic valve regurgitation      Assessment & Plan    -Ascending aortic aneurysm with dissection- s/p ascending aortic dissection repair/proximal replacement, gacron interposition graft, kelli procedure; insertion of right femoral shiley- POD#6 Pagni  -cardiac tamponade- reexploration for bleeding, removal of large clot compressing the RV- POD#2 Camporrotondo  - severe aortic valve insufficiency  - ESRD on PD  - Lupus--on Cellcept/plaquenil; currently held  - hx of seizures  - chronic immunosuppression  - hypertension  - chronic anemia  - TCP--chronic     Remains intubated, following commands-- hopeful to get her extubated this morning.  Sinus rhythm -- rate in the 80s   On cardene-- increase some PO medications  Cortrac  to be placed, tolerating trickle feeds right now  Continue supportive care       CAESAR Cotto  11/08/23  07:14 EST

## 2023-11-08 NOTE — PROGRESS NOTES
[facility] Progress Note      Patient Name: Dee Herrera  : 1990  MRN: [id number]  Primary Care Physician:  Margarita Woods APRN  Date of admission: 10/26/2023    Subjective     Interval History:   Follow-up end-stage renal disease on peritoneal dialysis    The patient had dialysis yesterday 2.5 L were removed as reported in the I's and O's chart.  She remains on the ventilator following simple commands.  She is currently on nicardipine drip 50 cc/h    Review of Systems:   As noted above    Objective     Vitals:   Temp:  [98.1 °F (36.7 °C)-98.5 °F (36.9 °C)] 98.1 °F (36.7 °C)  Heart Rate:  [] 97  Resp:  [20-23] 23  BP: ()/(65-88) 117/72  FiO2 (%):  [40 %] 40 %    Intake/Output Summary (Last 24 hours) at 2023 0735  Last data filed at 2023 0500  Gross per 24 hour   Intake 718 ml   Output 2825 ml   Net -2107 ml       Physical Exam:    General Appearance: On the ventilator, arousable, chronically ill and frail  Skin: warm and dry  HEENT: Orally intubated  Neck: No JVD  Lungs: Scattered rhonchi unlabored breathing effort  Heart: RRR, faint rub  Abdomen: soft, no guarding nondistended, normoactive bowels and PD catheter in place with clean exit site  Extremities: no edema, cyanosis or clubbing, temporary dialysis catheter in the right femoral vein  Neuro: Following simple commands    Scheduled Meds:     amLODIPine, 5 mg, Oral, Q24H  [Held by provider] aspirin, 81 mg, Oral, Daily  atorvastatin, 40 mg, Oral, Nightly  carvedilol, 12.5 mg, Oral, Q12H  chlorhexidine, 15 mL, Mouth/Throat, Q12H  [Held by provider] heparin (porcine), 5,000 Units, Subcutaneous, Q8H  insulin regular, 2-7 Units, Subcutaneous, Q6H  Lacosamide, 50 mg, Intravenous, Q12H  levETIRAcetam, 500 mg, Intravenous, Q12H  mupirocin, , Each Nare, BID  pantoprazole, 40 mg, Oral, QAM  senna-docusate sodium, 2 tablet, Oral, Nightly  sodium chloride, 4 mL, Nebulization, BID - RT      IV Meds:    dexmedetomidine, 0.2-1.5 mcg/kg/hr, Last Rate: Stopped (11/07/23 0555)  EPINEPHrine, 0.02-0.3 mcg/kg/min  insulin, 0-100 Units/hr  niCARdipine, 5-15 mg/hr, Last Rate: 5 mg/hr (11/08/23 0703)  nitroglycerin, 5-200 mcg/min  norepinephrine, 0.02-0.3 mcg/kg/min, Last Rate: Stopped (11/06/23 1607)  Pharmacy Consult - Pharmacy to dose,   propofol, 5-50 mcg/kg/min, Last Rate: Stopped (11/06/23 5108)  sodium chloride, 30 mL/hr, Last Rate: 30 mL/hr (11/02/23 1400)  vasopressin, 0.02-0.1 Units/min        Results Reviewed:   I have personally reviewed the results from the time of this admission to 11/8/2023 07:35 EST     Results from last 7 days   Lab Units 11/08/23 0258 11/07/23 0256 11/06/23 1754 11/06/23  1312   SODIUM mmol/L 136 140 139 136  135*   POTASSIUM mmol/L 3.6 4.0 4.0 5.2  5.1   CHLORIDE mmol/L 103 106 104 103  103   CO2 mmol/L 22.0 20.0* 20.1* 14.0*  15.0*   BUN mg/dL 18 30* 24* 26*  26*   CREATININE mg/dL 3.24* 4.75* 4.27* 4.72*  4.78*   CALCIUM mg/dL 8.8 8.3* 7.9* 10.2  10.1   BILIRUBIN mg/dL 0.4 0.3  --  0.3   ALK PHOS U/L 64 50  --  65   ALT (SGPT) U/L <5 <5  --  <5   AST (SGOT) U/L 22 23  --  22   GLUCOSE mg/dL 124* 89 108* 66  67       Estimated Creatinine Clearance: 23.5 mL/min (A) (by C-G formula based on SCr of 3.24 mg/dL (H)).    Results from last 7 days   Lab Units 11/08/23 0258 11/07/23 0256 11/06/23 1754 11/05/23  0301 11/04/23  0345   MAGNESIUM mg/dL 1.8 1.9  --   --  2.1   PHOSPHORUS mg/dL 3.1 5.2* 5.0*   < > 4.0    < > = values in this interval not displayed.             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  Her electrolyte within acceptable range and appears to be euvolemic  Hyponatremia associated with excessive water intake, resolved, sodium today is 136  Cardiomyopathy ejection fraction about 40%  Thrombocytopenia, platelet today 112,000  Anemia of chronic kidney disease hemoglobin today is 10  SLE.  Mitral and aortic valve insufficiency with DeBakey type I dissection which is felt to be either subacute or chronic, she underwent repair earlier today  Seizure disorder  Hypertension currently on nicardipine drip which leading to increased volume intake.    PLAN:  Continue the same treatment  Hemodialysis t tomorrow  Surveillance lab  I discussed the case with the patient's nurse at the bedside, I asked the nurse to communicate with cardiovascular surgery to see if it is okay to switch the patient to Cleviprex now, since she has been off it for the past 2 days to minimize the volume intake.    I reviewed the chart and other providers note, I reviewed imaging and lab data.  Copied text in this note has been reviewed and is accurate as of 11/08/23.       Thank you for involving us in the care of Dee Herrera.  Please feel free to call with any questions.    Isaac Diaz MD  11/08/23  07:35 [facility]    [facility] \[facility]\""  [phone]    Please note that portions of this note were completed with a voice recognition program.

## 2023-11-08 NOTE — PROGRESS NOTES
[facility] Follow Up    Chief Complaint: Follow up aortic aneurysm/dissection, severe AR     Interval History: Unable to extubate last night.  Started on trickle feeds through her OG tube present concern for possible emesis so tube feeds stopped.  Chest x-ray and KUB this morning unremarkable.  She is remains on Cardene for hypertension although on a low-dose.    Objective:     Objective:  Temp:  [98.1 °F (36.7 °C)-98.5 °F (36.9 °C)] 98.1 °F (36.7 °C)  Heart Rate:  [] 97  Resp:  [20-23] 23  BP: ()/(65-88) 117/72  FiO2 (%):  [40 %] 40 %     Intake/Output Summary (Last 24 hours) at 11/8/2023 0743  Last data filed at 11/8/2023 0500  Gross per 24 hour   Intake 718 ml   Output 2825 ml   Net -2107 ml     Body mass index is 21.71 kg/m².      11/06/23  1340 11/07/23  1400 11/08/23  0600   Weight: 55 kg (121 lb 4.1 oz) 60.1 kg (132 lb 7.9 oz) 59.1 kg (130 lb 4.7 oz)     Weight change: 5.1 kg (11 lb 3.9 oz)      Physical Exam:   General : Alert, cooperative, in no acute distress.  Neuro: Alert,cooperative and oriented.  Lungs: CTAB. Normal respiratory effort and rate.  CV: Regular rate and rhythm, normal S1 and S2, no murmurs, gallops or rubs.  ABD: Soft, nontender, nondistended. Positive bowel sounds.  Extr: No edema or cyanosis, moves all extremities.    Lab Review:   Results from last 7 days   Lab Units 11/08/23  0258 11/07/23  0256   SODIUM mmol/L 136 140   POTASSIUM mmol/L 3.6 4.0   CHLORIDE mmol/L 103 106   CO2 mmol/L 22.0 20.0*   BUN mg/dL 18 30*   CREATININE mg/dL 3.24* 4.75*   GLUCOSE mg/dL 124* 89   CALCIUM mg/dL 8.8 8.3*   AST (SGOT) U/L 22 23   ALT (SGPT) U/L <5 <5         Results from last 7 days   Lab Units 11/08/23  0258 11/07/23  0256   WBC 10*3/mm3 10.72 9.25   HEMOGLOBIN g/dL 10.0* 8.9*   HEMATOCRIT % 30.0* 26.7*   PLATELETS 10*3/mm3 112* 88*     Results from last 7 days   Lab Units 11/06/23  1754 11/06/23  1736 11/06/23  1631   INR  1.50* 1.6* 1.69*   APTT seconds 34.3  --   35.1     Results from last 7 days   Lab Units 11/08/23  0258 11/07/23  0256   MAGNESIUM mg/dL 1.8 1.9     Results from last 7 days   Lab Units 11/06/23  1312   TRIGLYCERIDES mg/dL 202*             I reviewed the patient's new clinical results.  I personally viewed and interpreted the patient's EKG  Current Medications:   Scheduled Meds:amLODIPine, 5 mg, Oral, Q24H  [Held by provider] aspirin, 81 mg, Oral, Daily  atorvastatin, 40 mg, Oral, Nightly  carvedilol, 12.5 mg, Oral, Q12H  chlorhexidine, 15 mL, Mouth/Throat, Q12H  [Held by provider] heparin (porcine), 5,000 Units, Subcutaneous, Q8H  insulin regular, 2-7 Units, Subcutaneous, Q6H  Lacosamide, 50 mg, Intravenous, Q12H  levETIRAcetam, 500 mg, Intravenous, Q12H  mupirocin, , Each Nare, BID  pantoprazole, 40 mg, Oral, QAM  senna-docusate sodium, 2 tablet, Oral, Nightly  sodium chloride, 4 mL, Nebulization, BID - RT      Continuous Infusions:dexmedetomidine, 0.2-1.5 mcg/kg/hr, Last Rate: Stopped (11/07/23 0555)  EPINEPHrine, 0.02-0.3 mcg/kg/min  insulin, 0-100 Units/hr  niCARdipine, 5-15 mg/hr, Last Rate: 5 mg/hr (11/08/23 0703)  nitroglycerin, 5-200 mcg/min  norepinephrine, 0.02-0.3 mcg/kg/min, Last Rate: Stopped (11/06/23 1607)  Pharmacy Consult - Pharmacy to dose,   propofol, 5-50 mcg/kg/min, Last Rate: Stopped (11/06/23 2143)  sodium chloride, 30 mL/hr, Last Rate: 30 mL/hr (11/02/23 1400)  vasopressin, 0.02-0.1 Units/min        Allergies:  Allergies   Allergen Reactions    Minoxidil Other (See Comments) and Hives     Pericardial effusion .       Assessment/Plan:     Cardiac tamponade.  Due to pericardial thrombus anteriorly and compressing right ventricle.  Underwent reexploration with clot removal and treatment of a small amount of bleeding at the suture line on 11/6.  Cardiogenic shock.  Due to #1.  Resolved and actually required IV antihypertensives overnight.  Ascending aortic aneurysm with subacute/chronic aortic root dissection.  Status post repair and  proximal aortic replacement on 11/2 .  Severe aortic valve regurgitation.  Due to #3.  Now status post repair on 11/2.  Seizures.  Started postoperatively.  Currently appears to be doing from this respect on medical therapy.    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  On peritoneal dialysis normally.  Has been on hemodialysis postoperatively.    Hyponatremia.  Resolved.  Hypertension.  On carvedilol and amlodipine along with nicardipine infusion.  Chronic anemia. Stable postoperatively.  Systemic lupus erythematosus.  Quinnell and mycophenolate on hold for surgery.  Thrombocytopenia.  Stable.    -Attempt to wean off nicardipine infusion.  Titrate oral antihypertensives as needed to accomplish this.  Would like to avoid keeping her on IV antihypertensive medications if possible.  - Hopefully will have better luck with extubation today.    Juana Taylor MD  11/08/23  07:43 EST

## 2023-11-08 NOTE — PROGRESS NOTES
Dr. JAYLA Hay    [facility] CARDIO RECOVERY        Patient ID:  Name:  Dee Herrera  MRN:  [id number]  1990  33 y.o.  female            CC/Reason for visit:Management mechanical ventilation, status post aortic aneurysm/dissection repair with valve sparing procedure.      Interval hx: Patient remains intubated, mechanically ventilated.  She does wake up, is a little drowsy but follows a few simple commands.  Last night spontaneous breathing trial was tried but she had a very low negative inspiratory force and shallow breaths with borderline low vital capacity.  She was not appropriate for extubation last night.  I reviewed mechanical ventilator settings today.    ROS: Unobtainable, intubated    Vitals:  Vitals:    11/08/23 0830 11/08/23 0900 11/08/23 0930 11/08/23 1000   BP: 119/81 124/83 120/83 118/81   Pulse: 95 96 98 98   Resp:       Temp:       TempSrc:       SpO2: 97% 97% 97% 96%   Weight:       Height:         FiO2 (%): 40 %     Body mass index is 21.71 kg/m².    Intake/Output Summary (Last 24 hours) at 11/8/2023 1239  Last data filed at 11/8/2023 1200  Gross per 24 hour   Intake 643 ml   Output 2790 ml   Net -2147 ml       Exam:  GEN:               Awake  Intubated  Mechanically ventilated  Opens eyes, follows commands  LUNGS:           Chest tubes in place.  Distant and diminished breath sounds bilat, no use of accessory muscles  CV:                  Sternotomy dressing clean dry and intact, distant heart tones, trace ankle edema  ABD:                Non tender, no enlarged liver or masses      Scheduled meds:  amLODIPine, 10 mg, Oral, Q24H  [Held by provider] aspirin, 81 mg, Oral, Daily  atorvastatin, 40 mg, Oral, Nightly  carvedilol, 12.5 mg, Oral, Q12H  chlorhexidine, 15 mL, Mouth/Throat, Q12H  First Mouthwash (Magic Mouthwash), 10 mL, Swish & Spit, Q6H  [Held by provider] heparin (porcine), 5,000 Units, Subcutaneous, Q8H  insulin regular, 2-7 Units, Subcutaneous, Q6H  Lacosamide, 50  mg, Intravenous, Q12H  levETIRAcetam, 500 mg, Intravenous, Q12H  mupirocin, , Each Nare, BID  pantoprazole, 40 mg, Oral, QAM  senna-docusate sodium, 2 tablet, Oral, Nightly  sodium chloride, 4 mL, Nebulization, BID - RT      IV meds:                      dexmedetomidine, 0.2-1.5 mcg/kg/hr, Last Rate: Stopped (11/07/23 0555)  EPINEPHrine, 0.02-0.3 mcg/kg/min  insulin, 0-100 Units/hr  niCARdipine, 5-15 mg/hr, Last Rate: 5 mg/hr (11/08/23 0703)  nitroglycerin, 5-200 mcg/min  norepinephrine, 0.02-0.3 mcg/kg/min, Last Rate: Stopped (11/06/23 1607)  Pharmacy Consult - Pharmacy to dose,   propofol, 5-50 mcg/kg/min, Last Rate: Stopped (11/06/23 2143)  sodium chloride, 30 mL/hr, Last Rate: 30 mL/hr (11/02/23 1400)  vasopressin, 0.02-0.1 Units/min        Data Review:   I reviewed the patient's medications and new clinical results.            Results from last 7 days   Lab Units 11/08/23  0258 11/07/23  0256 11/06/23  1754 11/06/23  1736 11/06/23  1631 11/06/23  1607 11/06/23  1312   SODIUM mmol/L 136 140 139  --   --   --  136  135*   POTASSIUM mmol/L 3.6 4.0 4.0  --   --   --  5.2  5.1   CHLORIDE mmol/L 103 106 104  --   --   --  103  103   CO2 mmol/L 22.0 20.0* 20.1*  --   --   --  14.0*  15.0*   BUN mg/dL 18 30* 24*  --   --   --  26*  26*   CREATININE mg/dL 3.24* 4.75* 4.27*  --   --   --  4.72*  4.78*   CALCIUM mg/dL 8.8 8.3* 7.9*  --   --   --  10.2  10.1   BILIRUBIN mg/dL 0.4 0.3  --   --   --   --  0.3   ALK PHOS U/L 64 50  --   --   --   --  65   ALT (SGPT) U/L <5 <5  --   --   --   --  <5   AST (SGOT) U/L 22 23  --   --   --   --  22   GLUCOSE mg/dL 124* 89 108*  --   --   --  66  67   WBC 10*3/mm3 10.72 9.25 8.73  --  7.86  --  13.01*   HEMOGLOBIN g/dL 10.0* 8.9* 8.6*  --  4.9*  --  8.9*   HEMOGLOBIN, POC   --   --   --   --   --    < >  --    PLATELETS 10*3/mm3 112* 88* 92*  --  123*  --  110*   INR   --   --  1.50* 1.6* 1.69*  --   --     < > = values in this interval not displayed.                  Results from last 7 days   Lab Units 11/07/23  2259 11/06/23  1607 11/06/23  1328 11/06/23  0719 11/05/23  0034 11/04/23  0659 11/04/23  0654 11/03/23  1000 11/02/23  1727   PH, ARTERIAL pH units 7.471* 7.4210 7.448 7.440 7.257*  --  7.411 7.599* 7.514*   PCO2, ARTERIAL mm Hg 27.2*  --  20.2* 29.7* 48.1*  --  35.6 26.3* 37.7   PO2 ART mm Hg 153.9*  --  110.1* 147.5* 526.7*  --  147.2* 125.5* 183.4*   O2 SATURATION ART % 99.5*  --  98.7* 99.4* 100.0*  --  99.3* 99.4* 99.7*   MODALITY  Adult Vent  --  Adult Vent Adult Vent Adult Vent Adult Vent Adult Vent Adult Vent Adult Vent             ASSESSMENT:   Dissecting ascending aortic aneurysm, status post repair  Postoperative respiratory failure  Management mechanical ventilation    Thrombocytopenia    Hypertension secondary to other renal disorders    Pancytopenia    Systemic lupus erythematosus    Pericardial effusion with tamponade requiring reexploration sternotomy    End stage renal disease on dialysis    Seizure disorder    Elevated liver function tests    Essential hypertension    Peritoneal dialysis catheter in place    Anemia due to chronic kidney disease, on chronic dialysis    Hyponatremia    Poor appetite    Moderate malnutrition    Chronic diastolic CHF (congestive heart failure)    Aortic valve lesion    Severe aortic valve regurgitation      PLAN:  She is still a little drowsy and sleepy.  We will try spontaneous breathing trial later in the day if she wakes up more.  She is not receiving any IV sedation.  Her drowsiness may be due to the antiepileptics but she requires these medications due to several episodes of seizures over the past 72 hours.  Last night she failed spontaneous breathing trial due to shallow breathing, low vital capacity and low negative inspiratory force.  We will try spontaneous breathing trial with pressure support of 6 in the afternoon today.  Discussed with Dr. Quiroz and team        Vincent Hay MD  11/8/2023

## 2023-11-08 NOTE — SIGNIFICANT NOTE
11/08/23 1400   Vent Information   Vent Serial Number [id number]   Settings   FiO2 (%) 40 %   PEEP/CPAP (cm H2O) 5 cm H20   Insp Rise Time (%) 70 %   Pressure Support (cm H2O) 6 cm H20   Trigger Sensitivity Flow (L/min) 3 L/min   Disconnect Sensitivity (%) 75 %   Readings   Resp Rate (Observed) Vent 19   I:E Ratio (Obs) 1:2.6   Vt Mandatory Ins (observed, mL) 306 mL   Vt Spontaneous (mL) (Obs) 332 mL   Vt, Exp (observed, mL) 332 mL   Minute Ventilation (L/min) (Obs) 6.84 L/min   PIP Observed (cm H2O) 11 cm H2O   MAP (cm H2O) 7   PEEP Observed cmH2O 5 cmH2O   Plateau Pressure (cm H2O) 0 cm H2O   Driving Pressure (cm H2O) -5 cm H2O   Static Compliance (L/cm H2O) 0   Dynamic Compliance (L/cm H2O) 36 L/cm H2O   Alarms   Insp Pressure High (cm H2O) 40 cm H2O   MV High (L/min) 20 L/min   MV Low (L/min) 2.35 L/min   Vt High (ML) 1000 ML   Vt Low (ML) 275 ML   Resp Rate High (bpm) 35 bpm   Apnea Interval (sec) 20 seconds   Apnea Resp Rate (bpm) 20 bpm   Apnea Volume (mL) 400   Apnea Peak Flow (L/min) 50 L/min   Apnea FIO2% 100 %   B = Both Spontaneous Awakening and Breathing Trials   Safety Screen Spontaneous Breathing Trial (SBT)  Proceed with SBT - No exclusion criteria met   Spontaneous Breathing Trial (SBT) Outcome SBT Passed   $ SBT Readiness to Wean yes   Vt Spontaneous (mL) 300 mL   RSBI 66.67   Negative Inspiratory Force (cm H2O) (S)  10  (MD Bharti informed - stay on PS as tolerated)   Effort (NIF) poor   Resp Rate (Obs) 20     Leave on PS 6 and PEEP of 5 as tolerated per MD Bharti

## 2023-11-08 NOTE — SIGNIFICANT NOTE
11/08/23 0942   OTHER   Discipline physical therapist   Rehab Time/Intention   Session Not Performed unable to treat, medical status change  (pt still on vent, PT will follow up tomorrow)   Recommendation   PT - Next Appointment 11/09/23

## 2023-11-08 NOTE — NURSING NOTE
On 0400 assessment, pt abdomen noted to be distended.  Hypoactive bowel sounds, no BM since 11/1/2023.  Stool softeners administered, Ducolax administered.  Trickle feeds initiated yesterday 11/7/2023.  Patient is not tolerating tube feeds at this time.  Pt had episode of emesis.  Tube feeds turned off at this time.

## 2023-11-09 ENCOUNTER — APPOINTMENT (OUTPATIENT)
Dept: GENERAL RADIOLOGY | Facility: HOSPITAL | Age: 33
DRG: 219 | End: 2023-11-09
Payer: MEDICARE

## 2023-11-09 LAB
ALBUMIN SERPL-MCNC: 2.9 G/DL (ref 3.5–5.2)
ALBUMIN/GLOB SERPL: 1.3 G/DL
ALP SERPL-CCNC: 67 U/L (ref 39–117)
ALT SERPL W P-5'-P-CCNC: <5 U/L (ref 1–33)
ANION GAP SERPL CALCULATED.3IONS-SCNC: 9.8 MMOL/L (ref 5–15)
ARTERIAL PATENCY WRIST A: ABNORMAL
AST SERPL-CCNC: 21 U/L (ref 1–32)
ATMOSPHERIC PRESS: 750.4 MMHG
BASE EXCESS BLDA CALC-SCNC: 0.7 MMOL/L (ref 0–2)
BASOPHILS # BLD AUTO: 0.02 10*3/MM3 (ref 0–0.2)
BASOPHILS NFR BLD AUTO: 0.2 % (ref 0–1.5)
BDY SITE: ABNORMAL
BH BB BLOOD EXPIRATION DATE: NORMAL
BH BB BLOOD TYPE BARCODE: 5100
BH BB DISPENSE STATUS: NORMAL
BH BB PRODUCT CODE: NORMAL
BH BB UNIT NUMBER: NORMAL
BILIRUB SERPL-MCNC: 0.3 MG/DL (ref 0–1.2)
BUN SERPL-MCNC: 29 MG/DL (ref 6–20)
BUN/CREAT SERPL: 6.3 (ref 7–25)
CALCIUM SPEC-SCNC: 8.7 MG/DL (ref 8.6–10.5)
CHLORIDE SERPL-SCNC: 105 MMOL/L (ref 98–107)
CO2 BLDA-SCNC: 24.2 MMOL/L (ref 23–27)
CO2 SERPL-SCNC: 22.2 MMOL/L (ref 22–29)
CREAT SERPL-MCNC: 4.58 MG/DL (ref 0.57–1)
CROSSMATCH INTERPRETATION: NORMAL
DEPRECATED RDW RBC AUTO: 50.7 FL (ref 37–54)
DEVICE COMMENT: ABNORMAL
EGFRCR SERPLBLD CKD-EPI 2021: 12.5 ML/MIN/1.73
EOSINOPHIL # BLD AUTO: 0.04 10*3/MM3 (ref 0–0.4)
EOSINOPHIL NFR BLD AUTO: 0.4 % (ref 0.3–6.2)
ERYTHROCYTE [DISTWIDTH] IN BLOOD BY AUTOMATED COUNT: 15.9 % (ref 12.3–15.4)
GLOBULIN UR ELPH-MCNC: 2.2 GM/DL
GLUCOSE BLDC GLUCOMTR-MCNC: 113 MG/DL (ref 70–130)
GLUCOSE BLDC GLUCOMTR-MCNC: 119 MG/DL (ref 70–130)
GLUCOSE BLDC GLUCOMTR-MCNC: 99 MG/DL (ref 70–130)
GLUCOSE SERPL-MCNC: 114 MG/DL (ref 65–99)
HCO3 BLDA-SCNC: 23.3 MMOL/L (ref 22–28)
HCT VFR BLD AUTO: 27 % (ref 34–46.6)
HEMODILUTION: NO
HGB BLD-MCNC: 8.8 G/DL (ref 12–15.9)
INHALED O2 CONCENTRATION: 40 %
LYMPHOCYTES # BLD AUTO: 0.3 10*3/MM3 (ref 0.7–3.1)
LYMPHOCYTES NFR BLD AUTO: 3.4 % (ref 19.6–45.3)
MAGNESIUM SERPL-MCNC: 2 MG/DL (ref 1.6–2.6)
MCH RBC QN AUTO: 28.3 PG (ref 26.6–33)
MCHC RBC AUTO-ENTMCNC: 32.6 G/DL (ref 31.5–35.7)
MCV RBC AUTO: 86.8 FL (ref 79–97)
MODALITY: ABNORMAL
MONOCYTES # BLD AUTO: 0.99 10*3/MM3 (ref 0.1–0.9)
MONOCYTES NFR BLD AUTO: 11.1 % (ref 5–12)
NEUTROPHILS NFR BLD AUTO: 7.4 10*3/MM3 (ref 1.7–7)
NEUTROPHILS NFR BLD AUTO: 82.7 % (ref 42.7–76)
O2 A-A PPRESDIFF RESPIRATORY: 0.6 MMHG
PCO2 BLDA: 29.9 MM HG (ref 35–45)
PEEP RESPIRATORY: 5 CM[H2O]
PH BLDA: 7.5 PH UNITS (ref 7.35–7.45)
PHOSPHATE SERPL-MCNC: 2.8 MG/DL (ref 2.5–4.5)
PLATELET # BLD AUTO: 92 10*3/MM3 (ref 140–450)
PMV BLD AUTO: 12.2 FL (ref 6–12)
PO2 BLDA: 158.3 MM HG (ref 80–100)
POTASSIUM SERPL-SCNC: 3.5 MMOL/L (ref 3.5–5.2)
PROT SERPL-MCNC: 5.1 G/DL (ref 6–8.5)
PSV: 10 CMH2O
RBC # BLD AUTO: 3.11 10*6/MM3 (ref 3.77–5.28)
SAO2 % BLDCOA: 99.6 % (ref 92–98.5)
SODIUM SERPL-SCNC: 137 MMOL/L (ref 136–145)
TOTAL RATE: 24 BREATHS/MINUTE
UNIT  ABO: NORMAL
UNIT  RH: NORMAL
VENTILATOR MODE: ABNORMAL
WBC NRBC COR # BLD: 8.95 10*3/MM3 (ref 3.4–10.8)

## 2023-11-09 PROCEDURE — 84100 ASSAY OF PHOSPHORUS: CPT | Performed by: INTERNAL MEDICINE

## 2023-11-09 PROCEDURE — 94799 UNLISTED PULMONARY SVC/PX: CPT

## 2023-11-09 PROCEDURE — 99024 POSTOP FOLLOW-UP VISIT: CPT | Performed by: THORACIC SURGERY (CARDIOTHORACIC VASCULAR SURGERY)

## 2023-11-09 PROCEDURE — 71045 X-RAY EXAM CHEST 1 VIEW: CPT

## 2023-11-09 PROCEDURE — 25010000002 LACOSAMIDE 200 MG/20ML SOLUTION

## 2023-11-09 PROCEDURE — 83735 ASSAY OF MAGNESIUM: CPT | Performed by: INTERNAL MEDICINE

## 2023-11-09 PROCEDURE — 82948 REAGENT STRIP/BLOOD GLUCOSE: CPT

## 2023-11-09 PROCEDURE — 25010000002 HEPARIN (PORCINE) PER 1000 UNITS

## 2023-11-09 PROCEDURE — 85025 COMPLETE CBC W/AUTO DIFF WBC: CPT | Performed by: INTERNAL MEDICINE

## 2023-11-09 PROCEDURE — C9254 INJECTION, LACOSAMIDE: HCPCS

## 2023-11-09 PROCEDURE — 94760 N-INVAS EAR/PLS OXIMETRY 1: CPT

## 2023-11-09 PROCEDURE — 94761 N-INVAS EAR/PLS OXIMETRY MLT: CPT

## 2023-11-09 PROCEDURE — 25010000002 HYDRALAZINE PER 20 MG

## 2023-11-09 PROCEDURE — 99232 SBSQ HOSP IP/OBS MODERATE 35: CPT | Performed by: INTERNAL MEDICINE

## 2023-11-09 PROCEDURE — 93010 ELECTROCARDIOGRAM REPORT: CPT | Performed by: INTERNAL MEDICINE

## 2023-11-09 PROCEDURE — 82803 BLOOD GASES ANY COMBINATION: CPT

## 2023-11-09 PROCEDURE — 94664 DEMO&/EVAL PT USE INHALER: CPT

## 2023-11-09 PROCEDURE — 25010000002 LEVETRIRACETAM PER 10 MG

## 2023-11-09 PROCEDURE — 93005 ELECTROCARDIOGRAM TRACING: CPT | Performed by: THORACIC SURGERY (CARDIOTHORACIC VASCULAR SURGERY)

## 2023-11-09 PROCEDURE — 80053 COMPREHEN METABOLIC PANEL: CPT | Performed by: INTERNAL MEDICINE

## 2023-11-09 PROCEDURE — 94003 VENT MGMT INPAT SUBQ DAY: CPT

## 2023-11-09 RX ORDER — LEVETIRACETAM 500 MG/5ML
250 INJECTION, SOLUTION, CONCENTRATE INTRAVENOUS EVERY 12 HOURS SCHEDULED
Status: DISCONTINUED | OUTPATIENT
Start: 2023-11-10 | End: 2023-11-15

## 2023-11-09 RX ORDER — HYDROCODONE BITARTRATE AND ACETAMINOPHEN 5; 325 MG/1; MG/1
1 TABLET ORAL EVERY 4 HOURS PRN
Status: ACTIVE | OUTPATIENT
Start: 2023-11-09 | End: 2023-11-12

## 2023-11-09 RX ORDER — LEVETIRACETAM 500 MG/5ML
250 INJECTION, SOLUTION, CONCENTRATE INTRAVENOUS ONCE
Status: DISCONTINUED | OUTPATIENT
Start: 2023-11-09 | End: 2023-11-14

## 2023-11-09 RX ORDER — PANTOPRAZOLE SODIUM 40 MG/10ML
40 INJECTION, POWDER, LYOPHILIZED, FOR SOLUTION INTRAVENOUS
Status: DISCONTINUED | OUTPATIENT
Start: 2023-11-09 | End: 2023-12-05

## 2023-11-09 RX ADMIN — SENNOSIDES AND DOCUSATE SODIUM 2 TABLET: 50; 8.6 TABLET ORAL at 21:08

## 2023-11-09 RX ADMIN — Medication 4 ML: at 06:54

## 2023-11-09 RX ADMIN — CARVEDILOL 25 MG: 25 TABLET, FILM COATED ORAL at 21:08

## 2023-11-09 RX ADMIN — DIPHENHYDRAMINE HYDROCHLORIDE AND LIDOCAINE HYDROCHLORIDE AND ALUMINUM HYDROXIDE AND MAGNESIUM HYDRO 10 ML: KIT at 21:08

## 2023-11-09 RX ADMIN — PANTOPRAZOLE SODIUM 40 MG: 40 INJECTION, POWDER, FOR SOLUTION INTRAVENOUS at 11:48

## 2023-11-09 RX ADMIN — CARVEDILOL 25 MG: 25 TABLET, FILM COATED ORAL at 08:23

## 2023-11-09 RX ADMIN — HYDRALAZINE HYDROCHLORIDE 20 MG: 20 INJECTION INTRAMUSCULAR; INTRAVENOUS at 14:09

## 2023-11-09 RX ADMIN — DIPHENHYDRAMINE HYDROCHLORIDE AND LIDOCAINE HYDROCHLORIDE AND ALUMINUM HYDROXIDE AND MAGNESIUM HYDRO 10 ML: KIT at 10:50

## 2023-11-09 RX ADMIN — 0.12% CHLORHEXIDINE GLUCONATE 15 ML: 1.2 RINSE ORAL at 21:08

## 2023-11-09 RX ADMIN — ACETAMINOPHEN 650 MG: 160 SOLUTION ORAL at 00:56

## 2023-11-09 RX ADMIN — ACETAMINOPHEN 650 MG: 160 SOLUTION ORAL at 18:15

## 2023-11-09 RX ADMIN — Medication 4 ML: at 19:18

## 2023-11-09 RX ADMIN — LEVETIRACETAM 500 MG: 500 INJECTION, SOLUTION INTRAVENOUS at 08:33

## 2023-11-09 RX ADMIN — MUPIROCIN 1 APPLICATION: 20 OINTMENT TOPICAL at 08:23

## 2023-11-09 RX ADMIN — LACOSAMIDE 50 MG: 10 INJECTION INTRAVENOUS at 00:56

## 2023-11-09 RX ADMIN — DIPHENHYDRAMINE HYDROCHLORIDE AND LIDOCAINE HYDROCHLORIDE AND ALUMINUM HYDROXIDE AND MAGNESIUM HYDRO 10 ML: KIT at 14:35

## 2023-11-09 RX ADMIN — HEPARIN SODIUM 3000 UNITS: 1000 INJECTION INTRAVENOUS; SUBCUTANEOUS at 10:49

## 2023-11-09 RX ADMIN — 0.12% CHLORHEXIDINE GLUCONATE 15 ML: 1.2 RINSE ORAL at 08:23

## 2023-11-09 RX ADMIN — IPRATROPIUM BROMIDE AND ALBUTEROL SULFATE 3 ML: 2.5; .5 SOLUTION RESPIRATORY (INHALATION) at 06:54

## 2023-11-09 RX ADMIN — HYDRALAZINE HYDROCHLORIDE 20 MG: 20 INJECTION INTRAMUSCULAR; INTRAVENOUS at 08:29

## 2023-11-09 RX ADMIN — LACOSAMIDE 50 MG: 10 INJECTION INTRAVENOUS at 11:48

## 2023-11-09 RX ADMIN — MUPIROCIN 1 APPLICATION: 20 OINTMENT TOPICAL at 21:08

## 2023-11-09 RX ADMIN — ATORVASTATIN CALCIUM 40 MG: 20 TABLET, FILM COATED ORAL at 21:08

## 2023-11-09 RX ADMIN — DIPHENHYDRAMINE HYDROCHLORIDE AND LIDOCAINE HYDROCHLORIDE AND ALUMINUM HYDROXIDE AND MAGNESIUM HYDRO 10 ML: KIT at 03:23

## 2023-11-09 RX ADMIN — IPRATROPIUM BROMIDE AND ALBUTEROL SULFATE 3 ML: 2.5; .5 SOLUTION RESPIRATORY (INHALATION) at 19:15

## 2023-11-09 RX ADMIN — AMLODIPINE BESYLATE 10 MG: 10 TABLET ORAL at 08:23

## 2023-11-09 NOTE — PROGRESS NOTES
"Nutrition Services    Patient Name:  Dee Herrera  YOB: 1990  MRN: [id number]  Admit Date:  10/26/2023    Assessment Date:  11/09/23    Summary: TF follow up:  Current TF's: Novasource Renal @ 10 ml/hr w/ 30 ml q 4 hrs free water flushes via cortrak (ND).  1 BM today per RN   Intubated/ventilated. Sedation off but remains drowsy.    Meds reviewed, IVF@30ml/hr, pericolace, dulcolax prn (given x2 11/8)  Labs reviewed, Hgb 8.8 (4u PRBC today), BUN 29, Crea 4.58, PLT 92, Alb 2.9, Gluc 111/99/114  HD 11/9- 850 ml removed    Recommend:  Advance TF's 10 ml q 6 hrs as tolerates to goal rate of Novasource Renal @ 35 ml/hr, with 30 ml q 4 hrs free water flushes or per renal team.     RD to follow closely.    CLINICAL NUTRITION ASSESSMENT      Reason for Assessment Follow-up Protocol, Tube Feeding Assessment      Diagnosis/Problem SOA, hyponatremia, ESRD on dialysis, abd pain     Anthropometrics        Current Height  Current Weight  BMI kg/m2 Height: 165 cm (64.96\")  Weight: 59.6 kg (131 lb 6.3 oz) (11/09/23 0539)  Body mass index is 21.89 kg/m².   Adjusted BMI (if applicable)    BMI Category Normal/Healthy (18.4 - 24.9)   Ideal Body Weight (IBW) 125lb   Usual Body Weight (UBW) 120-155   Weight Trend Loss, 5lbs recently, 35lb overall   --  Estimated/Assessed Needs        Current Weight  Weight: 55 kg (121 lb 4.1 oz) (11/06/23 1340)       Energy Requirements    Weight for Calculation 52.2 kg   Method for Estimation  30-35 kcal/kg   EST Needs (kcal/day) 8537-6361       Protein Requirements    Weight for Calculation 52.2 kg   EST Protein Needs (g/kg) 1.0 gm/kg, 1.5 gm/kg   EST Daily Needs (g/day) 52-78       Fluid Requirements     Method for Estimation 1 mL/kcal    EST Needs (mL/day)      Labs       Pertinent Labs    Results from last 7 days   Lab Units 11/09/23  0306 11/08/23  0258 11/07/23  0256   SODIUM mmol/L 137 136 140   POTASSIUM mmol/L 3.5 3.6 4.0   CHLORIDE mmol/L 105 103 106   CO2 mmol/L 22.2 22.0 " 20.0*   BUN mg/dL 29* 18 30*   CREATININE mg/dL 4.58* 3.24* 4.75*   CALCIUM mg/dL 8.7 8.8 8.3*   BILIRUBIN mg/dL 0.3 0.4 0.3   ALK PHOS U/L 67 64 50   ALT (SGPT) U/L <5 <5 <5   AST (SGOT) U/L 21 22 23   GLUCOSE mg/dL 114* 124* 89     Results from last 7 days   Lab Units 11/09/23  0306 11/08/23  0258 11/07/23  0256 11/06/23  1607 11/06/23  1312   MAGNESIUM mg/dL 2.0 1.8 1.9  --   --    PHOSPHORUS mg/dL 2.8 3.1 5.2*   < > 5.6*   HEMOGLOBIN g/dL 8.8* 10.0* 8.9*   < > 8.9*   HEMOGLOBIN, POC   --   --   --    < >  --    HEMATOCRIT % 27.0* 30.0* 26.7*   < > 28.5*   HEMATOCRIT POC   --   --   --    < >  --    WBC 10*3/mm3 8.95 10.72 9.25   < > 13.01*   TRIGLYCERIDES mg/dL  --   --   --   --  202*   ALBUMIN g/dL 2.9* 3.2* 2.8*   < > 2.9*  2.9*    < > = values in this interval not displayed.     Results from last 7 days   Lab Units 11/09/23  0306 11/08/23  0258 11/07/23  0256 11/06/23  1754 11/06/23  1736 11/06/23  1631 11/04/23  0345 11/03/23  0407   INR   --   --   --  1.50* 1.6* 1.69*  --  1.20*   APTT seconds  --   --   --  34.3  --  35.1  --   --    PLATELETS 10*3/mm3 92* 112* 88* 92*  --  123*   < > 172    < > = values in this interval not displayed.     COVID19   Date Value Ref Range Status   10/31/2023 Not Detected Not Detected - Ref. Range Final     Lab Results   Component Value Date    HGBA1C 5.10 10/30/2023          Medications           Scheduled Medications amLODIPine, 10 mg, Oral, Q24H  [Held by provider] aspirin, 81 mg, Oral, Daily  atorvastatin, 40 mg, Oral, Nightly  carvedilol, 25 mg, Oral, Q12H  chlorhexidine, 15 mL, Mouth/Throat, Q12H  First Mouthwash (Magic Mouthwash), 10 mL, Swish & Spit, Q6H  [Held by provider] heparin (porcine), 5,000 Units, Subcutaneous, Q8H  insulin regular, 2-7 Units, Subcutaneous, Q6H  Lacosamide, 50 mg, Intravenous, Q12H  levETIRAcetam, 250 mg, Intravenous, Once  levETIRAcetam, 500 mg, Intravenous, Q12H  mupirocin, , Each Nare, BID  pantoprazole, 40 mg, Intravenous, Q  AM  senna-docusate sodium, 2 tablet, Oral, Nightly  sodium chloride, 4 mL, Nebulization, BID - RT       Infusions dexmedetomidine, 0.2-1.5 mcg/kg/hr, Last Rate: Stopped (11/07/23 0555)  niCARdipine, 5-15 mg/hr, Last Rate: Stopped (11/08/23 1130)  Pharmacy Consult - Pharmacy to dose,   sodium chloride, 30 mL/hr, Last Rate: 30 mL/hr (11/02/23 1400)       PRN Medications   acetaminophen **OR** acetaminophen **OR** acetaminophen    bisacodyl    bisacodyl    dextrose    dextrose    glucagon (human recombinant)    heparin (porcine)    hydrALAZINE    HYDROcodone-acetaminophen    ipratropium-albuterol    LORazepam    Morphine **AND** naloxone    nitroglycerin    ondansetron    Pharmacy Consult - Pharmacy to dose    polyethylene glycol    Potassium Replacement - Follow Nurse / BPA Driven Protocol     Physical Findings          General Findings somnolent, ventilator support   Oral/Mouth Cavity other:tongue swelling, lesions   Edema  1+ (trace)   Gastrointestinal normoactive, last bowel movement: 11/9   Skin  surgical incision: sternal   Tubes/Drains/Lines none, chest tube, Cortrak, dialysis catheter, ND tube, OG tube, bridle in place @105 cm   NFPE See Malnutrition Severity Assessment   --  Malnutrition Severity Assessment      Patient meets criteria for : Moderate (non-severe) Malnutrition       Current Nutrition Orders & Evaluation of Intake       Oral Nutrition     Food Allergies NKFA   Current PO Diet NPO Diet NPO Type: Strict NPO   Supplement n/a   PO Evaluation     % PO Intake N/a    Factors Affecting Intake: altered respiratory status   --   Enteral Nutrition     Enteral Route ND    TF Delivery Method Continuous    Propofol Rate/Kcal     Current TF Order/Rate  Novasource Renal @ 10 mL/hr    TF Goal Rate 35 mL/hr     Current Water Flush 30 mL Q 4 hr    Modular None    TF Residual  n/a - tube is small bowel    TF Tolerance tolerating    TF Observation Verified correct TF and water flush infusing per orders     PES  STATEMENT / NUTRITION DIAGNOSIS      Nutrition Dx Problem  Problem: Unintentional Weight Loss, Malnutrition (moderate), and Inadequate Oral Intake  Etiology: Factors Affecting Nutrition - decrease appetite, abd pain    Signs/Symptoms: PO intake, NFPE Results, Unintended Weight Change, and Report/Observation     NUTRITION INTERVENTION / PLAN OF CARE      Intervention Goal(s) Maintain nutrition status, Reduce/improve symptoms, Meet estimated needs, Disease management/therapy, Initiate TF/PN, and Maintain weight         RD Intervention/Action Await initiation of EN/PN, Continue to monitor, Care plan reviewed, and Recommend/order: EN   --      Prescription/Orders:       PO Diet       Supplements       Enteral Nutrition    Enteral Prescription:     Enteral Route ND    TF Delivery Method Continuous    Enteral Product Novasource Renal    Modular None    Propofol Rate/Kcal     TF Start Rate  10 mL/hr     TF Goal Rate  35 mL/hr    Free Water Flush 30 mL Q 4 hr    Provision at Goal:          Calories 1680 kcal, meets 100 % needs         Protein  76 gm protein, meets 100 % needs         Fluid (mL) 605 mL free water + 180 mL in flushes    Prescription Ordered Yes         Parenteral Nutrition    New Prescription Ordered? Yes   --      Monitor/Evaluation Per protocol, I&O, Pertinent labs, EN delivery/tolerance, Weight, GI status, Symptoms, Swallow function, Hemodynamic stability   Discharge Plan/Needs Pending clinical course   --    RD to follow per protocol.      Electronically signed by:  Crystal Edwards RD  11/09/23 13:24 EST

## 2023-11-09 NOTE — PROGRESS NOTES
" LOS: 14 days   Patient Care Team:  Margarita Woods APRN as PCP - General (Nurse Practitioner)  Winnie Sanchez MD as Referring Physician (Obstetrics and Gynecology)  Norberto Almaraz MD PhD as Consulting Physician (Hematology and Oncology)  Lpuis Thomas MD as Consulting Physician (Nephrology)  Hair Mckeon MD as Consulting Physician (Nephrology)  Juana Taylor MD as Consulting Physician (Cardiology)    Chief Complaint: post op    Subjective      Vital Signs  Temp:  [98.8 °F (37.1 °C)-99.6 °F (37.6 °C)] 98.8 °F (37.1 °C)  Heart Rate:  [79-98] 84  Resp:  [20-24] 23  BP: (100-129)/(75-93) 125/90  FiO2 (%):  [40 %] 40 %  Body mass index is 21.89 kg/m².    Intake/Output Summary (Last 24 hours) at 11/9/2023 0731  Last data filed at 11/9/2023 0400  Gross per 24 hour   Intake 1088 ml   Output 260 ml   Net 828 ml     No intake/output data recorded.    Chest tube drainage last 8 hours 90/60        11/07/23  1400 11/08/23  0600 11/09/23  0539   Weight: 60.1 kg (132 lb 7.9 oz) 59.1 kg (130 lb 4.7 oz) 59.6 kg (131 lb 6.3 oz)         Objective:  Vital signs: (most recent): Blood pressure 115/77, pulse 86, temperature 98.6 °F (37 °C), temperature source Oral, resp. rate 21, height 165 cm (64.96\"), weight 59.6 kg (131 lb 6.3 oz), last menstrual period 08/10/2022, SpO2 99%, not currently breastfeeding.                Results Review:        WBC WBC   Date Value Ref Range Status   11/09/2023 8.95 3.40 - 10.80 10*3/mm3 Final   11/08/2023 10.72 3.40 - 10.80 10*3/mm3 Final   11/07/2023 9.25 3.40 - 10.80 10*3/mm3 Final   11/06/2023 8.73 3.40 - 10.80 10*3/mm3 Final   11/06/2023 7.86 3.40 - 10.80 10*3/mm3 Final   11/06/2023 13.01 (H) 3.40 - 10.80 10*3/mm3 Final      HGB Hemoglobin   Date Value Ref Range Status   11/09/2023 8.8 (L) 12.0 - 15.9 g/dL Final   11/08/2023 10.0 (L) 12.0 - 15.9 g/dL Final   11/07/2023 8.9 (L) 12.0 - 15.9 g/dL Final   11/06/2023 8.6 (L) 12.0 - 15.9 g/dL Final   11/06/2023 4.9 (C) 12.0 - 15.9 " g/dL Final   11/06/2023 6.5 (C) 12.0 - 17.0 g/dL Final   11/06/2023 8.9 (L) 12.0 - 15.9 g/dL Final      HCT Hematocrit   Date Value Ref Range Status   11/09/2023 27.0 (L) 34.0 - 46.6 % Final   11/08/2023 30.0 (L) 34.0 - 46.6 % Final   11/07/2023 26.7 (L) 34.0 - 46.6 % Final   11/06/2023 26.0 (L) 34.0 - 46.6 % Final   11/06/2023 15.4 (C) 34.0 - 46.6 % Final   11/06/2023 19 (L) 38 - 51 % Final   11/06/2023 28.5 (L) 34.0 - 46.6 % Final      Platelets Platelets   Date Value Ref Range Status   11/09/2023 92 (L) 140 - 450 10*3/mm3 Final   11/08/2023 112 (L) 140 - 450 10*3/mm3 Final   11/07/2023 88 (L) 140 - 450 10*3/mm3 Final   11/06/2023 92 (L) 140 - 450 10*3/mm3 Final   11/06/2023 123 (L) 140 - 450 10*3/mm3 Final   11/06/2023 110 (L) 140 - 450 10*3/mm3 Final        PT/INR:    Protime   Date Value Ref Range Status   11/06/2023 18.4 (H) 11.7 - 14.2 Seconds Final   11/06/2023 18.6 (H) 12.8 - 15.2 seconds Final     Comment:     Serial Number: [id number]:  [id number]   11/06/2023 20.1 (H) 11.7 - 14.2 Seconds Final   /  INR   Date Value Ref Range Status   11/06/2023 1.50 (H) 0.90 - 1.10 Final   11/06/2023 1.6 (H) 0.8 - 1.2 Final   11/06/2023 1.69 (H) 0.90 - 1.10 Final       Sodium Sodium   Date Value Ref Range Status   11/09/2023 137 136 - 145 mmol/L Final   11/08/2023 136 136 - 145 mmol/L Final   11/07/2023 140 136 - 145 mmol/L Final   11/06/2023 139 136 - 145 mmol/L Final   11/06/2023 135 (L) 136 - 145 mmol/L Final   11/06/2023 136 136 - 145 mmol/L Final      Potassium Potassium   Date Value Ref Range Status   11/09/2023 3.5 3.5 - 5.2 mmol/L Final   11/08/2023 3.6 3.5 - 5.2 mmol/L Final   11/07/2023 4.0 3.5 - 5.2 mmol/L Final   11/06/2023 4.0 3.5 - 5.2 mmol/L Final   11/06/2023 5.1 3.5 - 5.2 mmol/L Final   11/06/2023 5.2 3.5 - 5.2 mmol/L Final      Chloride Chloride   Date Value Ref Range Status   11/09/2023 105 98 - 107 mmol/L Final   11/08/2023 103 98 - 107 mmol/L Final   11/07/2023 106 98 - 107 mmol/L Final   11/06/2023  104 98 - 107 mmol/L Final   11/06/2023 103 98 - 107 mmol/L Final   11/06/2023 103 98 - 107 mmol/L Final      Bicarbonate CO2   Date Value Ref Range Status   11/09/2023 22.2 22.0 - 29.0 mmol/L Final   11/08/2023 22.0 22.0 - 29.0 mmol/L Final   11/07/2023 20.0 (L) 22.0 - 29.0 mmol/L Final   11/06/2023 20.1 (L) 22.0 - 29.0 mmol/L Final   11/06/2023 15.0 (L) 22.0 - 29.0 mmol/L Final   11/06/2023 14.0 (L) 22.0 - 29.0 mmol/L Final      BUN BUN   Date Value Ref Range Status   11/09/2023 29 (H) 6 - 20 mg/dL Final   11/08/2023 18 6 - 20 mg/dL Final   11/07/2023 30 (H) 6 - 20 mg/dL Final   11/06/2023 24 (H) 6 - 20 mg/dL Final   11/06/2023 26 (H) 6 - 20 mg/dL Final   11/06/2023 26 (H) 6 - 20 mg/dL Final      Creatinine Creatinine   Date Value Ref Range Status   11/09/2023 4.58 (H) 0.57 - 1.00 mg/dL Final   11/08/2023 3.24 (H) 0.57 - 1.00 mg/dL Final   11/07/2023 4.75 (H) 0.57 - 1.00 mg/dL Final   11/06/2023 4.27 (H) 0.57 - 1.00 mg/dL Final   11/06/2023 4.78 (H) 0.57 - 1.00 mg/dL Final   11/06/2023 4.72 (H) 0.57 - 1.00 mg/dL Final      Calcium Calcium   Date Value Ref Range Status   11/09/2023 8.7 8.6 - 10.5 mg/dL Final   11/08/2023 8.8 8.6 - 10.5 mg/dL Final   11/07/2023 8.3 (L) 8.6 - 10.5 mg/dL Final   11/06/2023 7.9 (L) 8.6 - 10.5 mg/dL Final   11/06/2023 10.1 8.6 - 10.5 mg/dL Final   11/06/2023 10.2 8.6 - 10.5 mg/dL Final      Magnesium Magnesium   Date Value Ref Range Status   11/09/2023 2.0 1.6 - 2.6 mg/dL Final   11/08/2023 1.8 1.6 - 2.6 mg/dL Final   11/07/2023 1.9 1.6 - 2.6 mg/dL Final          amLODIPine, 10 mg, Oral, Q24H  [Held by provider] aspirin, 81 mg, Oral, Daily  atorvastatin, 40 mg, Oral, Nightly  carvedilol, 25 mg, Oral, Q12H  chlorhexidine, 15 mL, Mouth/Throat, Q12H  First Mouthwash (Magic Mouthwash), 10 mL, Swish & Spit, Q6H  [Held by provider] heparin (porcine), 5,000 Units, Subcutaneous, Q8H  insulin regular, 2-7 Units, Subcutaneous, Q6H  Lacosamide, 50 mg, Intravenous, Q12H  levETIRAcetam, 500 mg,  Intravenous, Q12H  mupirocin, , Each Nare, BID  pantoprazole, 40 mg, Oral, QAM  senna-docusate sodium, 2 tablet, Oral, Nightly  sodium chloride, 4 mL, Nebulization, BID - RT      dexmedetomidine, 0.2-1.5 mcg/kg/hr, Last Rate: Stopped (11/07/23 0555)  EPINEPHrine, 0.02-0.3 mcg/kg/min  insulin, 0-100 Units/hr  niCARdipine, 5-15 mg/hr, Last Rate: Stopped (11/08/23 1130)  nitroglycerin, 5-200 mcg/min  norepinephrine, 0.02-0.3 mcg/kg/min, Last Rate: Stopped (11/06/23 1607)  Pharmacy Consult - Pharmacy to dose,   propofol, 5-50 mcg/kg/min, Last Rate: Stopped (11/06/23 2143)  sodium chloride, 30 mL/hr, Last Rate: 30 mL/hr (11/02/23 1400)  vasopressin, 0.02-0.1 Units/min              Dissecting ascending aortic aneurysm    Vitamin D deficiency    Thrombocytopenia    Hypertension secondary to other renal disorders    Pancytopenia    Systemic lupus erythematosus    Pericardial effusion    End stage renal disease on dialysis    Seizure disorder    Elevated liver function tests    Essential hypertension    Peritoneal dialysis catheter in place    Anemia due to chronic kidney disease, on chronic dialysis    Hyponatremia    Poor appetite    Moderate malnutrition    Chronic diastolic CHF (congestive heart failure)    Aortic valve lesion    Severe aortic valve regurgitation      Assessment & Plan    -Ascending aortic aneurysm with dissection- s/p ascending aortic dissection repair/proximal replacement, gacron interposition graft, kelli procedure; insertion of right femoral shiley- POD#7 Adam  -cardiac tamponade- reexploration for bleeding, removal of large clot compressing the RV- POD#3 [place]  - severe aortic valve insufficiency  - ESRD on PD  - Lupus--on Cellcept/plaquenil; currently held  - hx of seizures  - chronic immunosuppression  - hypertension  - chronic anemia  - TCP--chronic     Remains intubated, following commands-- hopeful to get her extubated this morning.  Pretty drowsy- Dr. Medina is concerned it is the  keppra that is making her so drowsy  I am not certain this is the case as she was very alert and awake Monday and has been on this dose   Sinus rhythm -- rate in the 80s   Dialysis today  Tolerating tube feeds  Will discuss with Dr. Medina about chest tube removal.  Continue supportive care       CAESAR Cotto  11/09/23  07:31 EST

## 2023-11-09 NOTE — PROGRESS NOTES
[facility] Progress Note      Patient Name: Dee Herrera  : 1990  MRN: [id number]  Primary Care Physician:  Margarita Woods APRN  Date of admission: 10/26/2023    Subjective     Interval History:   Follow-up end-stage renal disease on peritoneal dialysis      The patient remains on the ventilator, weaning failed yesterday, currently off nicardipine, following simple command    Review of Systems:   As noted above    Objective     Vitals:   Temp:  [98.8 °F (37.1 °C)-99.6 °F (37.6 °C)] 98.8 °F (37.1 °C)  Heart Rate:  [79-98] 82  Resp:  [20-24] 23  BP: (100-129)/(75-93) 117/89  FiO2 (%):  [40 %] 40 %    Intake/Output Summary (Last 24 hours) at 2023 0818  Last data filed at 2023 0400  Gross per 24 hour   Intake 1088 ml   Output 260 ml   Net 828 ml       Physical Exam:    General Appearance: On the ventilator, arousable, chronically ill and frail  Skin: warm and dry  HEENT: Orally intubated  Neck: No JVD  Lungs: Scattered rhonchi unlabored breathing effort  Heart: RRR, faint rub  Abdomen: soft, no guarding nondistended, normoactive bowels and PD catheter in place with clean exit site  Extremities: no edema, cyanosis or clubbing, temporary dialysis catheter in the right femoral vein  Neuro: Following simple commands    Scheduled Meds:     amLODIPine, 10 mg, Oral, Q24H  [Held by provider] aspirin, 81 mg, Oral, Daily  atorvastatin, 40 mg, Oral, Nightly  carvedilol, 25 mg, Oral, Q12H  chlorhexidine, 15 mL, Mouth/Throat, Q12H  First Mouthwash (Magic Mouthwash), 10 mL, Swish & Spit, Q6H  [Held by provider] heparin (porcine), 5,000 Units, Subcutaneous, Q8H  insulin regular, 2-7 Units, Subcutaneous, Q6H  Lacosamide, 50 mg, Intravenous, Q12H  levETIRAcetam, 500 mg, Intravenous, Q12H  mupirocin, , Each Nare, BID  pantoprazole, 40 mg, Oral, QAM  senna-docusate sodium, 2 tablet, Oral, Nightly  sodium chloride, 4 mL, Nebulization, BID - RT      IV Meds:   dexmedetomidine, 0.2-1.5  mcg/kg/hr, Last Rate: Stopped (11/07/23 0540)  EPINEPHrine, 0.02-0.3 mcg/kg/min  insulin, 0-100 Units/hr  niCARdipine, 5-15 mg/hr, Last Rate: Stopped (11/08/23 1130)  nitroglycerin, 5-200 mcg/min  norepinephrine, 0.02-0.3 mcg/kg/min, Last Rate: Stopped (11/06/23 1607)  Pharmacy Consult - Pharmacy to dose,   propofol, 5-50 mcg/kg/min, Last Rate: Stopped (11/06/23 9046)  sodium chloride, 30 mL/hr, Last Rate: 30 mL/hr (11/02/23 1400)  vasopressin, 0.02-0.1 Units/min        Results Reviewed:   I have personally reviewed the results from the time of this admission to 11/9/2023 08:18 EST     Results from last 7 days   Lab Units 11/09/23  0306 11/08/23 0258 11/07/23  0256   SODIUM mmol/L 137 136 140   POTASSIUM mmol/L 3.5 3.6 4.0   CHLORIDE mmol/L 105 103 106   CO2 mmol/L 22.2 22.0 20.0*   BUN mg/dL 29* 18 30*   CREATININE mg/dL 4.58* 3.24* 4.75*   CALCIUM mg/dL 8.7 8.8 8.3*   BILIRUBIN mg/dL 0.3 0.4 0.3   ALK PHOS U/L 67 64 50   ALT (SGPT) U/L <5 <5 <5   AST (SGOT) U/L 21 22 23   GLUCOSE mg/dL 114* 124* 89       Estimated Creatinine Clearance: 16.7 mL/min (A) (by C-G formula based on SCr of 4.58 mg/dL (H)).    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  Mitral and aortic valve insufficiency with DeBakey type I dissection which is felt to be either subacute or chronic, she underwent repair earlier today  Seizure disorder  Hypertension with ESRD, reasonably controlled    PLAN:  Continue the same treatment  Hemodialysis today  Surveillance lab  I discussed the case with the patient's nurse .    I reviewed the chart and other providers note, I reviewed imaging and lab data.  Copied text in this note has been reviewed and is accurate as of 11/09/23.       Thank you for involving us in the care of Dee Herrera.  Please feel free to call with any questions.    Isaac Diaz MD  11/09/23  08:18 [facility]    [facility]  [phone]    Please note that portions of this note were completed with a voice recognition program.

## 2023-11-09 NOTE — PROGRESS NOTES
Dr. JAYLA Hay    [facility] CARDIO RECOVERY        Patient ID:  Name:  Dee Herrera  MRN:  [id number]  1990  33 y.o.  female            CC/Reason for visit:Management mechanical ventilation, status post aortic aneurysm/dissection repair with valve sparing procedure.      Interval hx: Patient is very sleepy.  She can barely give me 2 thumbs up.  She failed her SBT trial again today due to very low negative inspiratory force and very shallow breathing with low vital capacity.  I reviewed mechanical ventilator settings today.  Discussed case with Dr. Medina.  Discussed with bedside nurse.    ROS: Unobtainable, intubated    Vitals:  Vitals:    11/09/23 0900 11/09/23 1000 11/09/23 1100 11/09/23 1200   BP: 115/85 111/82 109/74 115/77   BP Location:       Patient Position:       Pulse: 81 81 85 86   Resp:       Temp:       TempSrc:       SpO2: 98% 96% 97% 99%   Weight:       Height:         FiO2 (%): 40 %     Body mass index is 21.89 kg/m².    Intake/Output Summary (Last 24 hours) at 11/9/2023 1627  Last data filed at 11/9/2023 1140  Gross per 24 hour   Intake 1173 ml   Output 1110 ml   Net 63 ml       Exam:  GEN:  No distress  Intubated  Mechanically ventilated  Barely opens her eyes today.  Does not lift her hands on command.  She barely gives me a weak thumbs up in 1 hand.  LUNGS: Distant and diminished but clear breath sounds bilat, no use of accessory muscles  CV:  Sternotomy dressing clean dry and intact, distant heart tones, no edema  ABD:  Non tender, no enlarged liver or masses      Scheduled meds:  amLODIPine, 10 mg, Oral, Q24H  [Held by provider] aspirin, 81 mg, Oral, Daily  atorvastatin, 40 mg, Oral, Nightly  carvedilol, 25 mg, Oral, Q12H  chlorhexidine, 15 mL, Mouth/Throat, Q12H  First Mouthwash (Magic Mouthwash), 10 mL, Swish & Spit, Q6H  [Held by provider] heparin (porcine), 5,000 Units, Subcutaneous, Q8H  insulin regular, 2-7 Units, Subcutaneous, Q6H  Lacosamide, 50 mg, Intravenous,  Q12H  levETIRAcetam, 250 mg, Intravenous, Once  levETIRAcetam, 500 mg, Intravenous, Q12H  mupirocin, , Each Nare, BID  pantoprazole, 40 mg, Intravenous, Q AM  senna-docusate sodium, 2 tablet, Oral, Nightly  sodium chloride, 4 mL, Nebulization, BID - RT      IV meds:                      dexmedetomidine, 0.2-1.5 mcg/kg/hr, Last Rate: Stopped (11/07/23 0555)  niCARdipine, 5-15 mg/hr, Last Rate: Stopped (11/08/23 1130)  Pharmacy Consult - Pharmacy to dose,   sodium chloride, 30 mL/hr, Last Rate: 30 mL/hr (11/02/23 1400)        Data Review:   I reviewed the patient's medications and new clinical results.            Results from last 7 days   Lab Units 11/09/23  0306 11/08/23  0258 11/07/23  0256 11/06/23  1754 11/06/23  1736 11/06/23  1631   SODIUM mmol/L 137 136 140 139  --   --    POTASSIUM mmol/L 3.5 3.6 4.0 4.0  --   --    CHLORIDE mmol/L 105 103 106 104  --   --    CO2 mmol/L 22.2 22.0 20.0* 20.1*  --   --    BUN mg/dL 29* 18 30* 24*  --   --    CREATININE mg/dL 4.58* 3.24* 4.75* 4.27*  --   --    CALCIUM mg/dL 8.7 8.8 8.3* 7.9*  --   --    BILIRUBIN mg/dL 0.3 0.4 0.3  --   --   --    ALK PHOS U/L 67 64 50  --   --   --    ALT (SGPT) U/L <5 <5 <5  --   --   --    AST (SGOT) U/L 21 22 23  --   --   --    GLUCOSE mg/dL 114* 124* 89 108*  --   --    WBC 10*3/mm3 8.95 10.72 9.25 8.73  --  7.86   HEMOGLOBIN g/dL 8.8* 10.0* 8.9* 8.6*  --  4.9*   PLATELETS 10*3/mm3 92* 112* 88* 92*  --  123*   INR   --   --   --  1.50* 1.6* 1.69*                 Results from last 7 days   Lab Units 11/09/23  1443 11/08/23  1409 11/07/23  2259 11/06/23  1607 11/06/23  1328 11/06/23  0719 11/05/23  0034 11/04/23  0659 11/04/23  0654   PH, ARTERIAL pH units 7.499* 7.463* 7.471* 7.4210 7.448 7.440 7.257*  --  7.411   PCO2, ARTERIAL mm Hg 29.9* 30.9* 27.2*  --  20.2* 29.7* 48.1*  --  35.6   PO2 ART mm Hg 158.3* 141.5* 153.9*  --  110.1* 147.5* 526.7*  --  147.2*   O2 SATURATION ART % 99.6* 99.4* 99.5*  --  98.7* 99.4* 100.0*  --  99.3*    MODALITY  Adult Vent Adult Vent Adult Vent  --  Adult Vent Adult Vent Adult Vent Adult Vent Adult Vent             ASSESSMENT:   Dissecting ascending aortic aneurysm, status post repair  Postoperative respiratory failure  Management mechanical ventilation    Thrombocytopenia    Hypertension secondary to other renal disorders    Pancytopenia    Systemic lupus erythematosus    Pericardial effusion with tamponade requiring reexploration sternotomy    End stage renal disease on dialysis    Seizure disorder    Elevated liver function tests    Essential hypertension    Peritoneal dialysis catheter in place    Anemia due to chronic kidney disease, on chronic dialysis    Hyponatremia    Poor appetite    Moderate malnutrition    Chronic diastolic CHF (congestive heart failure)    Aortic valve lesion    Severe aortic valve regurgitation      PLAN:  Unfortunately she is not ready for extubation.  She continues to fail spontaneous breathing trial due to very low negative inspiratory force, very weak efforts with low vital capacity and rapid shallow breathing.  Neurologically today she is more sleep than other days.  I think we need to hold a couple of doses of Keppra and let her wake up a little bit.  I discussed this with Dr. Medina and the rest of the team.  We will continue to follow        Vincent Hay MD  11/9/2023

## 2023-11-09 NOTE — SIGNIFICANT NOTE
11/09/23 1453   B = Both Spontaneous Awakening and Breathing Trials   Safety Screen Spontaneous Breathing Trial (SBT)  Proceed with SBT - No exclusion criteria met   Spontaneous Breathing Trial (SBT) Outcome SBT Passed   $ SBT Readiness to Wean yes   Vt Spontaneous (mL) 324 mL   Vital Capacity (mL) 191   Effort (VC) poor   RSBI 72   Negative Inspiratory Force (cm H2O) -7.3   Effort (NIF) poor   Resp Rate (Obs) 24     Patient has very poor effort, unable to meet minimum requirements for extubation criteria. RN/APRN notified, awaiting pulmonology to round. ABG as follows;    pH 7.499  CO2 29.9   PO2 158.3  HcO3 23.3

## 2023-11-09 NOTE — PROGRESS NOTES
[facility] Follow Up    Chief Complaint: Follow up aortic aneurysm/dissection, severe AR      Interval History: Remains intubated.  She is on spontaneous breathing trial this morning.  Also receiving hemodialysis.  Off all drips.    Objective:     Objective:  Temp:  [98.8 °F (37.1 °C)-99.6 °F (37.6 °C)] 98.8 °F (37.1 °C)  Heart Rate:  [79-98] 84  Resp:  [20-24] 23  BP: (100-129)/(75-93) 125/90  FiO2 (%):  [40 %] 40 %     Intake/Output Summary (Last 24 hours) at 11/9/2023 0732  Last data filed at 11/9/2023 0400  Gross per 24 hour   Intake 1088 ml   Output 260 ml   Net 828 ml     Body mass index is 21.89 kg/m².      11/07/23  1400 11/08/23  0600 11/09/23  0539   Weight: 60.1 kg (132 lb 7.9 oz) 59.1 kg (130 lb 4.7 oz) 59.6 kg (131 lb 6.3 oz)     Weight change: -0.5 kg (-1 lb 1.6 oz)      Physical Exam:   General : Intubated  Neuro: Will wake to sound to voice.  Lungs: CTAB. Normal respiratory effort and rate.  CV: Regular rate and rhythm, normal S1 and S2, no murmurs, gallops or rubs.  ABD: Soft, nontender, nondistended. Positive bowel sounds.  Extr: No edema or cyanosis, moves all extremities.    Lab Review:   Results from last 7 days   Lab Units 11/09/23  0306 11/08/23  0258   SODIUM mmol/L 137 136   POTASSIUM mmol/L 3.5 3.6   CHLORIDE mmol/L 105 103   CO2 mmol/L 22.2 22.0   BUN mg/dL 29* 18   CREATININE mg/dL 4.58* 3.24*   GLUCOSE mg/dL 114* 124*   CALCIUM mg/dL 8.7 8.8   AST (SGOT) U/L 21 22   ALT (SGPT) U/L <5 <5         Results from last 7 days   Lab Units 11/09/23  0306 11/08/23  0258   WBC 10*3/mm3 8.95 10.72   HEMOGLOBIN g/dL 8.8* 10.0*   HEMATOCRIT % 27.0* 30.0*   PLATELETS 10*3/mm3 92* 112*     Results from last 7 days   Lab Units 11/06/23  1754 11/06/23  1736 11/06/23  1631   INR  1.50* 1.6* 1.69*   APTT seconds 34.3  --  35.1     Results from last 7 days   Lab Units 11/09/23  0306 11/08/23  0258   MAGNESIUM mg/dL 2.0 1.8     Results from last 7 days   Lab Units 11/06/23  1312    TRIGLYCERIDES mg/dL 202*             I reviewed the patient's new clinical results.  I personally viewed and interpreted the patient's EKG  Current Medications:   Scheduled Meds:amLODIPine, 10 mg, Oral, Q24H  [Held by provider] aspirin, 81 mg, Oral, Daily  atorvastatin, 40 mg, Oral, Nightly  carvedilol, 25 mg, Oral, Q12H  chlorhexidine, 15 mL, Mouth/Throat, Q12H  First Mouthwash (Magic Mouthwash), 10 mL, Swish & Spit, Q6H  [Held by provider] heparin (porcine), 5,000 Units, Subcutaneous, Q8H  insulin regular, 2-7 Units, Subcutaneous, Q6H  Lacosamide, 50 mg, Intravenous, Q12H  levETIRAcetam, 500 mg, Intravenous, Q12H  mupirocin, , Each Nare, BID  pantoprazole, 40 mg, Oral, QAM  senna-docusate sodium, 2 tablet, Oral, Nightly  sodium chloride, 4 mL, Nebulization, BID - RT      Continuous Infusions:dexmedetomidine, 0.2-1.5 mcg/kg/hr, Last Rate: Stopped (11/07/23 0555)  EPINEPHrine, 0.02-0.3 mcg/kg/min  insulin, 0-100 Units/hr  niCARdipine, 5-15 mg/hr, Last Rate: Stopped (11/08/23 1130)  nitroglycerin, 5-200 mcg/min  norepinephrine, 0.02-0.3 mcg/kg/min, Last Rate: Stopped (11/06/23 1607)  Pharmacy Consult - Pharmacy to dose,   propofol, 5-50 mcg/kg/min, Last Rate: Stopped (11/06/23 2143)  sodium chloride, 30 mL/hr, Last Rate: 30 mL/hr (11/02/23 1400)  vasopressin, 0.02-0.1 Units/min        Allergies:  Allergies   Allergen Reactions    Minoxidil Other (See Comments) and Hives     Pericardial effusion .       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  Status post repair and proximal aortic replacement on 11/2 .  Severe aortic valve regurgitation.  Due to #3.  Now status post repair on 11/2.  Cardiac tamponade.  Due to pericardial thrombus anteriorly and compressing right ventricle.  Underwent reexploration with clot removal and treatment of a small amount of bleeding at the suture line on 11/6.  Cardiogenic shock.  Due to #1.  Resolved and actually required IV  antihypertensives overnight.  Seizures.  Started postoperatively.  Currently appears to be doing from this respect on medical therapy.    Postoperative respiratory failure.  Initially remained intubated because of frequent recurrent seizures.  No concern about safely extubating because what appears to be lack of effort or weakness.  Pulmonary following.  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  On peritoneal dialysis normally.  Has been on hemodialysis postoperatively.    Hyponatremia.  Resolved.  Hypertension.  Improved with maximum dose amlodipine and carvedilol.  Chronic anemia. Stable postoperatively.  Systemic lupus erythematosus.  Quinnell and mycophenolate on hold for surgery.  Thrombocytopenia.  Stable    -Continue current cardiac management.  - Hopefully she can be safely extubated soon.    Juana Taylor MD  11/09/23  07:32 EST

## 2023-11-09 NOTE — PLAN OF CARE
Goal Outcome Evaluation:  Plan of Care Reviewed With: mother, grandparent, patient        Progress: no change  Outcome Evaluation: POD7 of aortic root replacement/kelli procedure and POD3 of sternal exploration and washout. Vitals stable. More sleepy today, but is following commands and nodding/shaking head appropriately to questions. Med adjustments per MDs; meds adminstered per MAR. Tubes, pacing wires, and central lines in place. Remains intubated. Dialysis completed today. Tube feeds infusing via cortrak per RD recommenations. Q2 turns. Will continue with current plan of care & adjust as needed.

## 2023-11-09 NOTE — PROGRESS NOTES
[facility] Clinical Pharmacy Services: Keppra Consult    Dee Herrera has a pharmacy consult to dose Keppra per Dr. Lowe's request.     Indication:  Seizures    Relevant clinical data and objective history reviewed:    Past Medical History:   Diagnosis Date    Anasarca     PER CT SCAN    Dry skin     ESRD (end stage renal disease) on dialysis     TUMARIA ISABEL, [place], [address]    History of abdominal pain     History of anemia     History of transfusion     Hypertension     Iron deficiency anemia 09/27/2021    Lupus (systemic lupus erythematosus) 07/30/2022    Migraine     Other specified nutritional anemias     Pericardial effusion     Renal insufficiency     Seizures     STATES LAST WAS 1/2023    Shortness of breath     OCCASIONAL    Vitamin D deficiency 09/27/2021     Assessment/Plan    Current dose: Keppra 500mg q12h. Ordered Keppra 250mg x1 to be given after 11/9 HD session. (medication is 50% dialyzable)     Thank you for this consult and please contact pharmacy with any questions or concerns.    ADDENDUM: Supplemental dose was not given 11/9. Spoke with pulmonology and plan to decrease levetiracetam dose going forward to 250mg IV BID with no supplemental dosing. Consult will be discontinued and pharmacy will sign off.     Elaina Correa, PharmD  Clinical Pharmacist

## 2023-11-10 ENCOUNTER — APPOINTMENT (OUTPATIENT)
Dept: GENERAL RADIOLOGY | Facility: HOSPITAL | Age: 33
DRG: 219 | End: 2023-11-10
Payer: MEDICARE

## 2023-11-10 LAB
ALBUMIN SERPL-MCNC: 2.8 G/DL (ref 3.5–5.2)
ANION GAP SERPL CALCULATED.3IONS-SCNC: 9 MMOL/L (ref 5–15)
ARTERIAL PATENCY WRIST A: ABNORMAL
ATMOSPHERIC PRESS: 754.3 MMHG
BASE EXCESS BLDA CALC-SCNC: -1.4 MMOL/L (ref 0–2)
BASOPHILS # BLD AUTO: 0.02 10*3/MM3 (ref 0–0.2)
BASOPHILS NFR BLD AUTO: 0.2 % (ref 0–1.5)
BDY SITE: ABNORMAL
BUN SERPL-MCNC: 24 MG/DL (ref 6–20)
BUN/CREAT SERPL: 6.8 (ref 7–25)
CALCIUM SPEC-SCNC: 8.7 MG/DL (ref 8.6–10.5)
CHLORIDE SERPL-SCNC: 104 MMOL/L (ref 98–107)
CO2 BLDA-SCNC: 22.8 MMOL/L (ref 23–27)
CO2 SERPL-SCNC: 22 MMOL/L (ref 22–29)
CREAT SERPL-MCNC: 3.55 MG/DL (ref 0.57–1)
DEPRECATED RDW RBC AUTO: 48.8 FL (ref 37–54)
EGFRCR SERPLBLD CKD-EPI 2021: 16.9 ML/MIN/1.73
EOSINOPHIL # BLD AUTO: 0.03 10*3/MM3 (ref 0–0.4)
EOSINOPHIL NFR BLD AUTO: 0.3 % (ref 0.3–6.2)
ERYTHROCYTE [DISTWIDTH] IN BLOOD BY AUTOMATED COUNT: 15.5 % (ref 12.3–15.4)
GLUCOSE BLDC GLUCOMTR-MCNC: 107 MG/DL (ref 70–130)
GLUCOSE BLDC GLUCOMTR-MCNC: 109 MG/DL (ref 70–130)
GLUCOSE BLDC GLUCOMTR-MCNC: 113 MG/DL (ref 70–130)
GLUCOSE BLDC GLUCOMTR-MCNC: 116 MG/DL (ref 70–130)
GLUCOSE BLDC GLUCOMTR-MCNC: 123 MG/DL (ref 70–130)
GLUCOSE SERPL-MCNC: 117 MG/DL (ref 65–99)
HCO3 BLDA-SCNC: 21.9 MMOL/L (ref 22–28)
HCT VFR BLD AUTO: 26.3 % (ref 34–46.6)
HEMODILUTION: NO
HGB BLD-MCNC: 8.7 G/DL (ref 12–15.9)
INHALED O2 CONCENTRATION: 40 %
LYMPHOCYTES # BLD AUTO: 0.34 10*3/MM3 (ref 0.7–3.1)
LYMPHOCYTES NFR BLD AUTO: 3.7 % (ref 19.6–45.3)
MCH RBC QN AUTO: 28.6 PG (ref 26.6–33)
MCHC RBC AUTO-ENTMCNC: 33.1 G/DL (ref 31.5–35.7)
MCV RBC AUTO: 86.5 FL (ref 79–97)
MEAN AIRWAY PRESSURE: 8
MODALITY: ABNORMAL
MONOCYTES # BLD AUTO: 1 10*3/MM3 (ref 0.1–0.9)
MONOCYTES NFR BLD AUTO: 10.8 % (ref 5–12)
NEUTROPHILS NFR BLD AUTO: 7.69 10*3/MM3 (ref 1.7–7)
NEUTROPHILS NFR BLD AUTO: 83.2 % (ref 42.7–76)
O2 A-A PPRESDIFF RESPIRATORY: 0.6 MMHG
PAW @ PEAK INSP FLOW SETTING VENT: 13 CMH2O
PCO2 BLDA: 30.6 MM HG (ref 35–45)
PEEP RESPIRATORY: 5 CM[H2O]
PH BLDA: 7.46 PH UNITS (ref 7.35–7.45)
PHOSPHATE SERPL-MCNC: 1.8 MG/DL (ref 2.5–4.5)
PLATELET # BLD AUTO: 87 10*3/MM3 (ref 140–450)
PMV BLD AUTO: 12 FL (ref 6–12)
PO2 BLDA: 166 MM HG (ref 80–100)
POTASSIUM SERPL-SCNC: 3.4 MMOL/L (ref 3.5–5.2)
PSV: 8 CMH2O
QT INTERVAL: 351 MS
QTC INTERVAL: 415 MS
RBC # BLD AUTO: 3.04 10*6/MM3 (ref 3.77–5.28)
SAO2 % BLDCOA: 99.6 % (ref 92–98.5)
SODIUM SERPL-SCNC: 135 MMOL/L (ref 136–145)
TOTAL RATE: 25 BREATHS/MINUTE
VENTILATOR MODE: ABNORMAL
WBC NRBC COR # BLD: 9.25 10*3/MM3 (ref 3.4–10.8)

## 2023-11-10 PROCEDURE — 71045 X-RAY EXAM CHEST 1 VIEW: CPT

## 2023-11-10 PROCEDURE — 94761 N-INVAS EAR/PLS OXIMETRY MLT: CPT

## 2023-11-10 PROCEDURE — 94664 DEMO&/EVAL PT USE INHALER: CPT

## 2023-11-10 PROCEDURE — 99232 SBSQ HOSP IP/OBS MODERATE 35: CPT | Performed by: INTERNAL MEDICINE

## 2023-11-10 PROCEDURE — 94003 VENT MGMT INPAT SUBQ DAY: CPT

## 2023-11-10 PROCEDURE — 82948 REAGENT STRIP/BLOOD GLUCOSE: CPT

## 2023-11-10 PROCEDURE — C9254 INJECTION, LACOSAMIDE: HCPCS

## 2023-11-10 PROCEDURE — 80069 RENAL FUNCTION PANEL: CPT | Performed by: INTERNAL MEDICINE

## 2023-11-10 PROCEDURE — 94799 UNLISTED PULMONARY SVC/PX: CPT

## 2023-11-10 PROCEDURE — 97167 OT EVAL HIGH COMPLEX 60 MIN: CPT

## 2023-11-10 PROCEDURE — 99024 POSTOP FOLLOW-UP VISIT: CPT | Performed by: THORACIC SURGERY (CARDIOTHORACIC VASCULAR SURGERY)

## 2023-11-10 PROCEDURE — 97110 THERAPEUTIC EXERCISES: CPT

## 2023-11-10 PROCEDURE — 82803 BLOOD GASES ANY COMBINATION: CPT

## 2023-11-10 PROCEDURE — 25010000002 LACOSAMIDE 200 MG/20ML SOLUTION

## 2023-11-10 PROCEDURE — 85025 COMPLETE CBC W/AUTO DIFF WBC: CPT | Performed by: INTERNAL MEDICINE

## 2023-11-10 PROCEDURE — 25010000002 LEVETRIRACETAM PER 10 MG: Performed by: INTERNAL MEDICINE

## 2023-11-10 RX ORDER — MANNITOL 250 MG/ML
12.5 INJECTION, SOLUTION INTRAVENOUS AS NEEDED
Status: CANCELLED | OUTPATIENT
Start: 2023-11-11 | End: 2023-11-11

## 2023-11-10 RX ADMIN — SENNOSIDES AND DOCUSATE SODIUM 2 TABLET: 50; 8.6 TABLET ORAL at 20:31

## 2023-11-10 RX ADMIN — MUPIROCIN 1 APPLICATION: 20 OINTMENT TOPICAL at 08:07

## 2023-11-10 RX ADMIN — DIPHENHYDRAMINE HYDROCHLORIDE AND LIDOCAINE HYDROCHLORIDE AND ALUMINUM HYDROXIDE AND MAGNESIUM HYDRO 10 ML: KIT at 20:37

## 2023-11-10 RX ADMIN — Medication 4 ML: at 20:57

## 2023-11-10 RX ADMIN — 0.12% CHLORHEXIDINE GLUCONATE 15 ML: 1.2 RINSE ORAL at 08:08

## 2023-11-10 RX ADMIN — 0.12% CHLORHEXIDINE GLUCONATE 15 ML: 1.2 RINSE ORAL at 20:31

## 2023-11-10 RX ADMIN — MUPIROCIN 1 APPLICATION: 20 OINTMENT TOPICAL at 20:32

## 2023-11-10 RX ADMIN — IPRATROPIUM BROMIDE AND ALBUTEROL SULFATE 3 ML: 2.5; .5 SOLUTION RESPIRATORY (INHALATION) at 07:05

## 2023-11-10 RX ADMIN — DIPHENHYDRAMINE HYDROCHLORIDE AND LIDOCAINE HYDROCHLORIDE AND ALUMINUM HYDROXIDE AND MAGNESIUM HYDRO 10 ML: KIT at 08:36

## 2023-11-10 RX ADMIN — AMLODIPINE BESYLATE 10 MG: 10 TABLET ORAL at 08:07

## 2023-11-10 RX ADMIN — LEVETIRACETAM 250 MG: 500 INJECTION, SOLUTION INTRAVENOUS at 20:31

## 2023-11-10 RX ADMIN — DIPHENHYDRAMINE HYDROCHLORIDE AND LIDOCAINE HYDROCHLORIDE AND ALUMINUM HYDROXIDE AND MAGNESIUM HYDRO 10 ML: KIT at 15:22

## 2023-11-10 RX ADMIN — DIPHENHYDRAMINE HYDROCHLORIDE AND LIDOCAINE HYDROCHLORIDE AND ALUMINUM HYDROXIDE AND MAGNESIUM HYDRO 10 ML: KIT at 04:09

## 2023-11-10 RX ADMIN — CARVEDILOL 25 MG: 25 TABLET, FILM COATED ORAL at 20:37

## 2023-11-10 RX ADMIN — DIBASIC SODIUM PHOSPHATE, MONOBASIC POTASSIUM PHOSPHATE AND MONOBASIC SODIUM PHOSPHATE 2 TABLET: 852; 155; 130 TABLET ORAL at 10:11

## 2023-11-10 RX ADMIN — LACOSAMIDE 50 MG: 10 INJECTION INTRAVENOUS at 11:31

## 2023-11-10 RX ADMIN — Medication 4 ML: at 07:06

## 2023-11-10 RX ADMIN — LEVETIRACETAM 250 MG: 500 INJECTION, SOLUTION INTRAVENOUS at 08:08

## 2023-11-10 RX ADMIN — LACOSAMIDE 50 MG: 10 INJECTION INTRAVENOUS at 00:32

## 2023-11-10 RX ADMIN — ATORVASTATIN CALCIUM 40 MG: 20 TABLET, FILM COATED ORAL at 20:31

## 2023-11-10 RX ADMIN — IPRATROPIUM BROMIDE AND ALBUTEROL SULFATE 3 ML: 2.5; .5 SOLUTION RESPIRATORY (INHALATION) at 20:57

## 2023-11-10 RX ADMIN — PANTOPRAZOLE SODIUM 40 MG: 40 INJECTION, POWDER, FOR SOLUTION INTRAVENOUS at 06:12

## 2023-11-10 RX ADMIN — CARVEDILOL 25 MG: 25 TABLET, FILM COATED ORAL at 08:07

## 2023-11-10 NOTE — SIGNIFICANT NOTE
11/10/23 0912   OTHER   Discipline physical therapist   Rehab Time/Intention   Session Not Performed unable to treat, medical status change  (pt remains on vent, PT will f/u Monday)   Recommendation   PT - Next Appointment 11/13/23

## 2023-11-10 NOTE — PROGRESS NOTES
[facility] Follow Up    Chief Complaint: follow up  aortic aneurysm/dissection, severe AR     Interval History: Remains intubated.  She is awake and alert but parameters during spontaneous breathing trials not appropriate for extubation.  Plan for possible left-side ultrasound-guided thoracentesis today to see if this will help per pulmonary.  Otherwise she remains hemodynamically stable and off of all infusions.    Objective:     Objective:  Temp:  [98.2 °F (36.8 °C)-100.2 °F (37.9 °C)] 98.2 °F (36.8 °C)  Heart Rate:  [80-96] 92  Resp:  [21-26] 26  BP: (109-135)/() 122/91  FiO2 (%):  [39 %-40 %] 40 %     Intake/Output Summary (Last 24 hours) at 11/10/2023 0735  Last data filed at 11/10/2023 0400  Gross per 24 hour   Intake 1198.81 ml   Output 930 ml   Net 268.81 ml     Body mass index is 20.17 kg/m².      11/08/23  0600 11/09/23  0539 11/10/23  0422   Weight: 59.1 kg (130 lb 4.7 oz) 59.6 kg (131 lb 6.3 oz) 54.9 kg (121 lb 0.5 oz)     Weight change: -4.7 kg (-10 lb 5.8 oz)      Physical Exam:   General : Alert, cooperative, in no acute distress.  Neuro: Alert,cooperative and oriented.  Lungs: CTAB. Normal respiratory effort and rate.  CV: Regular rate and rhythm, normal S1 and S2, no murmurs, gallops or rubs.  ABD: Soft, nontender, nondistended. Positive bowel sounds.  Extr: No edema or cyanosis, moves all extremities.    Lab Review:   Results from last 7 days   Lab Units 11/10/23  0311 11/09/23  0306 11/08/23  0258   SODIUM mmol/L 135* 137 136   POTASSIUM mmol/L 3.4* 3.5 3.6   CHLORIDE mmol/L 104 105 103   CO2 mmol/L 22.0 22.2 22.0   BUN mg/dL 24* 29* 18   CREATININE mg/dL 3.55* 4.58* 3.24*   GLUCOSE mg/dL 117* 114* 124*   CALCIUM mg/dL 8.7 8.7 8.8   AST (SGOT) U/L  --  21 22   ALT (SGPT) U/L  --  <5 <5         Results from last 7 days   Lab Units 11/10/23  0311 11/09/23  0306   WBC 10*3/mm3 9.25 8.95   HEMOGLOBIN g/dL 8.7* 8.8*   HEMATOCRIT % 26.3* 27.0*   PLATELETS 10*3/mm3 87* 92*      Results from last 7 days   Lab Units 11/06/23  1754 11/06/23  1736 11/06/23  1631   INR  1.50* 1.6* 1.69*   APTT seconds 34.3  --  35.1     Results from last 7 days   Lab Units 11/09/23  0306 11/08/23  0258   MAGNESIUM mg/dL 2.0 1.8     Results from last 7 days   Lab Units 11/06/23  1312   TRIGLYCERIDES mg/dL 202*             I reviewed the patient's new clinical results.  I personally viewed and interpreted the patient's EKG  Current Medications:   Scheduled Meds:amLODIPine, 10 mg, Oral, Q24H  [Held by provider] aspirin, 81 mg, Oral, Daily  atorvastatin, 40 mg, Oral, Nightly  carvedilol, 25 mg, Oral, Q12H  chlorhexidine, 15 mL, Mouth/Throat, Q12H  First Mouthwash (Magic Mouthwash), 10 mL, Swish & Spit, Q6H  [Held by provider] heparin (porcine), 5,000 Units, Subcutaneous, Q8H  insulin regular, 2-7 Units, Subcutaneous, Q6H  Lacosamide, 50 mg, Intravenous, Q12H  levETIRAcetam, 250 mg, Intravenous, Once  levETIRAcetam, 250 mg, Intravenous, Q12H  mupirocin, , Each Nare, BID  pantoprazole, 40 mg, Intravenous, Q AM  senna-docusate sodium, 2 tablet, Oral, Nightly  sodium chloride, 4 mL, Nebulization, BID - RT      Continuous Infusions:dexmedetomidine, 0.2-1.5 mcg/kg/hr, Last Rate: Stopped (11/07/23 0555)  niCARdipine, 5-15 mg/hr, Last Rate: Stopped (11/08/23 1130)  Pharmacy Consult - Pharmacy to dose,   sodium chloride, 30 mL/hr, Last Rate: 30 mL/hr (11/02/23 1400)        Allergies:  Allergies   Allergen Reactions    Minoxidil Other (See Comments) and Hives     Pericardial effusion .       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  Status post repair and proximal aortic replacement on 11/2 .  Severe aortic valve regurgitation.  Due to #3.  Now status post repair on 11/2.  Cardiac tamponade.  Due to pericardial thrombus anteriorly and compressing right ventricle.  Underwent reexploration with clot removal and treatment of a small amount of bleeding at the suture line on  11/6.  Cardiogenic shock.  Due to #1.  Resolved and actually required IV antihypertensives overnight.  Seizures.  Started postoperatively.  Currently appears to be doing from this respect on medical therapy.    Postoperative respiratory failure.  Initially remained intubated because of frequent recurrent seizures.  Now unable to extubate due to poor parameters with spontaneous breathing trials including low NIF and VC.  Pulmonary is following.  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  On peritoneal dialysis normally.  Has been on hemodialysis postoperatively.    Hyponatremia.  Resolved.  Hypertension.  Improved with maximum dose amlodipine and carvedilol.  Chronic anemia. Stable postoperatively.  Systemic lupus erythematosus.  Quinnell and mycophenolate on hold for surgery.  Thrombocytopenia.  Stable    -Plans for possible left-sided thoracentesis today per pulmonary noted to see if this will help get her extubated.  -Otherwise continue current cardiac management.    Juana Taylor MD  11/10/23  07:35 EST

## 2023-11-10 NOTE — PLAN OF CARE
The pt was admitted to [facility] and is s/p aortic valve/ascending aortic surgery on 11/2 and a Reexploration for bleeding on 11/6. Extubated on 11/10. Pmhx significant for ESRD on PD, seizure disorder, Lupus, hypertension, chronic anemia, and migraines. She lives with her mom and reports independence at baseline. OT orders were placed today to begin movement. She is very weak in her arms and legs - she tolerated both active and active assist ROM in functional patterns. She would benefit from [place] or waffle boots to prevent further drop foot. Dr. Medina cleared EOB sitting/bedside standing for the weekend. Rehab recommended.

## 2023-11-10 NOTE — PROGRESS NOTES
Dr. JAYLA Hay    [facility] CARDIO RECOVERY        Patient ID:  Name:  Dee Herrera  MRN:  [id number]  1990  33 y.o.  female            CC/Reason for visit:Management mechanical ventilation, status post aortic aneurysm/dissection repair with valve sparing procedure.       Interval hx: Patient is more alert today  She is now able to cough during SBT trial and produce VC of about 500-600 ml  Also had a NIF of -17 earlier for RT when prompted to cough  I reviewed mechanical ventilator settings     ROS: Unobtainable, intubated    Vitals:  Vitals:    11/10/23 0706 11/10/23 0800 11/10/23 0900 11/10/23 1115   BP:  128/92 126/90    BP Location:  Left arm Left arm    Patient Position:  Lying     Pulse: 92 90 89 89   Resp: 26 27 27 26   Temp:  99.3 °F (37.4 °C)     TempSrc:  Oral     SpO2: 100% 100% 100% 100%   Weight:       Height:         FiO2 (%): 40 %     Body mass index is 20.17 kg/m².    Intake/Output Summary (Last 24 hours) at 11/10/2023 1125  Last data filed at 11/10/2023 0900  Gross per 24 hour   Intake 1128.81 ml   Output 940 ml   Net 188.81 ml       Exam:  GEN:               No distress  Intubated  Mechanically ventilated  Opens eyes and follows commands today, appears very weak  LUNGS:           Distant and diminished but clear breath sounds bilat, no use of accessory muscles  CV:                  Sternotomy dressing clean dry and intact, distant heart tones, no edema  ABD:                Non tender, no enlarged liver or masses      Scheduled meds:  amLODIPine, 10 mg, Oral, Q24H  [Held by provider] aspirin, 81 mg, Oral, Daily  atorvastatin, 40 mg, Oral, Nightly  carvedilol, 25 mg, Oral, Q12H  chlorhexidine, 15 mL, Mouth/Throat, Q12H  First Mouthwash (Magic Mouthwash), 10 mL, Swish & Spit, Q6H  [Held by provider] heparin (porcine), 5,000 Units, Subcutaneous, Q8H  insulin regular, 2-7 Units, Subcutaneous, Q6H  Lacosamide, 50 mg, Intravenous, Q12H  levETIRAcetam, 250 mg, Intravenous,  Once  levETIRAcetam, 250 mg, Intravenous, Q12H  mupirocin, , Each Nare, BID  pantoprazole, 40 mg, Intravenous, Q AM  senna-docusate sodium, 2 tablet, Oral, Nightly  sodium chloride, 4 mL, Nebulization, BID - RT      IV meds:                      dexmedetomidine, 0.2-1.5 mcg/kg/hr, Last Rate: Stopped (11/07/23 0555)  niCARdipine, 5-15 mg/hr, Last Rate: Stopped (11/08/23 1130)  sodium chloride, 30 mL/hr, Last Rate: 30 mL/hr (11/02/23 1400)        Data Review:   I reviewed the patient's medications and new clinical results.            Results from last 7 days   Lab Units 11/10/23  0311 11/09/23  0306 11/08/23  0258 11/07/23  0256 11/06/23  1754 11/06/23  1736 11/06/23  1631   SODIUM mmol/L 135* 137 136 140 139  --   --    POTASSIUM mmol/L 3.4* 3.5 3.6 4.0 4.0  --   --    CHLORIDE mmol/L 104 105 103 106 104  --   --    CO2 mmol/L 22.0 22.2 22.0 20.0* 20.1*  --   --    BUN mg/dL 24* 29* 18 30* 24*  --   --    CREATININE mg/dL 3.55* 4.58* 3.24* 4.75* 4.27*  --   --    CALCIUM mg/dL 8.7 8.7 8.8 8.3* 7.9*  --   --    BILIRUBIN mg/dL  --  0.3 0.4 0.3  --   --   --    ALK PHOS U/L  --  67 64 50  --   --   --    ALT (SGPT) U/L  --  <5 <5 <5  --   --   --    AST (SGOT) U/L  --  21 22 23  --   --   --    GLUCOSE mg/dL 117* 114* 124* 89 108*  --   --    WBC 10*3/mm3 9.25 8.95 10.72 9.25 8.73  --  7.86   HEMOGLOBIN g/dL 8.7* 8.8* 10.0* 8.9* 8.6*  --  4.9*   PLATELETS 10*3/mm3 87* 92* 112* 88* 92*  --  123*   INR   --   --   --   --  1.50* 1.6* 1.69*                 Results from last 7 days   Lab Units 11/10/23  0736 11/09/23  1443 11/08/23  1409 11/07/23  2259 11/06/23  1607 11/06/23  1328 11/06/23  0719 11/05/23  0034   PH, ARTERIAL pH units 7.461* 7.499* 7.463* 7.471* 7.4210 7.448 7.440 7.257*   PCO2, ARTERIAL mm Hg 30.6* 29.9* 30.9* 27.2*  --  20.2* 29.7* 48.1*   PO2 ART mm Hg 166.0* 158.3* 141.5* 153.9*  --  110.1* 147.5* 526.7*   O2 SATURATION ART % 99.6* 99.6* 99.4* 99.5*  --  98.7* 99.4* 100.0*   MODALITY  Adult Vent Adult  Vent Adult Vent Adult Vent  --  Adult Vent Adult Vent Adult Vent             ASSESSMENT:   Dissecting ascending aortic aneurysm, status post repair  Postoperative respiratory failure  Management mechanical ventilation    Thrombocytopenia    Hypertension secondary to other renal disorders    Pancytopenia    Systemic lupus erythematosus    Pericardial effusion with tamponade requiring reexploration sternotomy    End stage renal disease on dialysis    Seizure disorder    Elevated liver function tests    Essential hypertension    Peritoneal dialysis catheter in place    Anemia due to chronic kidney disease, on chronic dialysis    Hyponatremia    Poor appetite    Moderate malnutrition    Chronic diastolic CHF (congestive heart failure)    Aortic valve lesion    Severe aortic valve regurgitation      PLAN:  I reviewed mechanical ventilator settings and CXR films w Dr Medina at bedside  She is able to pull Tv of approx 500-600 ml on spontaneous mode with a low PS of 4 when asked to cough.  The CXR is clear and US does not show much effusion on left side  She has a cuff leak per RT  We are extubating her now  If she develops respiratory distress later we can try Bipap noninvasive ventilation rather than immediate re-intubation        Vincent Hay MD  11/10/2023

## 2023-11-10 NOTE — PROGRESS NOTES
" LOS: 15 days   Patient Care Team:  Margarita Woods APRN as PCP - General (Nurse Practitioner)  Winnie Sanchez MD as Referring Physician (Obstetrics and Gynecology)  Norberto Almaraz MD PhD as Consulting Physician (Hematology and Oncology)  Lupis Thomas MD as Consulting Physician (Nephrology)  Hair Mckeon MD as Consulting Physician (Nephrology)  Juana Taylor MD as Consulting Physician (Cardiology)    Chief Complaint: post op    Subjective      Vital Signs  Temp:  [98.2 °F (36.8 °C)-100.2 °F (37.9 °C)] 98.2 °F (36.8 °C)  Heart Rate:  [80-96] 92  Resp:  [21-26] 26  BP: (109-135)/() 122/91  FiO2 (%):  [39 %-40 %] 40 %  Body mass index is 20.17 kg/m².    Intake/Output Summary (Last 24 hours) at 11/10/2023 0711  Last data filed at 11/10/2023 0400  Gross per 24 hour   Intake 1198.81 ml   Output 930 ml   Net 268.81 ml     No intake/output data recorded.    Chest tube drainage last 8 hours 30/30 11/08/23  0600 11/09/23  0539 11/10/23  0422   Weight: 59.1 kg (130 lb 4.7 oz) 59.6 kg (131 lb 6.3 oz) 54.9 kg (121 lb 0.5 oz)         Objective:  Vital signs: (most recent): Blood pressure 126/90, pulse 89, temperature 99.3 °F (37.4 °C), temperature source Oral, resp. rate 27, height 165 cm (64.96\"), weight 54.9 kg (121 lb 0.5 oz), last menstrual period 08/10/2022, SpO2 100%, not currently breastfeeding.                Results Review:        WBC WBC   Date Value Ref Range Status   11/10/2023 9.25 3.40 - 10.80 10*3/mm3 Final   11/09/2023 8.95 3.40 - 10.80 10*3/mm3 Final   11/08/2023 10.72 3.40 - 10.80 10*3/mm3 Final      HGB Hemoglobin   Date Value Ref Range Status   11/10/2023 8.7 (L) 12.0 - 15.9 g/dL Final   11/09/2023 8.8 (L) 12.0 - 15.9 g/dL Final   11/08/2023 10.0 (L) 12.0 - 15.9 g/dL Final      HCT Hematocrit   Date Value Ref Range Status   11/10/2023 26.3 (L) 34.0 - 46.6 % Final   11/09/2023 27.0 (L) 34.0 - 46.6 % Final   11/08/2023 30.0 (L) 34.0 - 46.6 % Final      Platelets Platelets " "  Date Value Ref Range Status   11/10/2023 87 (L) 140 - 450 10*3/mm3 Final   11/09/2023 92 (L) 140 - 450 10*3/mm3 Final   11/08/2023 112 (L) 140 - 450 10*3/mm3 Final        PT/INR:    No results found for: \"PROTIME\"  /  No results found for: \"INR\"      Sodium Sodium   Date Value Ref Range Status   11/10/2023 135 (L) 136 - 145 mmol/L Final   11/09/2023 137 136 - 145 mmol/L Final   11/08/2023 136 136 - 145 mmol/L Final      Potassium Potassium   Date Value Ref Range Status   11/10/2023 3.4 (L) 3.5 - 5.2 mmol/L Final   11/09/2023 3.5 3.5 - 5.2 mmol/L Final   11/08/2023 3.6 3.5 - 5.2 mmol/L Final      Chloride Chloride   Date Value Ref Range Status   11/10/2023 104 98 - 107 mmol/L Final   11/09/2023 105 98 - 107 mmol/L Final   11/08/2023 103 98 - 107 mmol/L Final      Bicarbonate CO2   Date Value Ref Range Status   11/10/2023 22.0 22.0 - 29.0 mmol/L Final   11/09/2023 22.2 22.0 - 29.0 mmol/L Final   11/08/2023 22.0 22.0 - 29.0 mmol/L Final      BUN BUN   Date Value Ref Range Status   11/10/2023 24 (H) 6 - 20 mg/dL Final   11/09/2023 29 (H) 6 - 20 mg/dL Final   11/08/2023 18 6 - 20 mg/dL Final      Creatinine Creatinine   Date Value Ref Range Status   11/10/2023 3.55 (H) 0.57 - 1.00 mg/dL Final   11/09/2023 4.58 (H) 0.57 - 1.00 mg/dL Final   11/08/2023 3.24 (H) 0.57 - 1.00 mg/dL Final      Calcium Calcium   Date Value Ref Range Status   11/10/2023 8.7 8.6 - 10.5 mg/dL Final   11/09/2023 8.7 8.6 - 10.5 mg/dL Final   11/08/2023 8.8 8.6 - 10.5 mg/dL Final      Magnesium Magnesium   Date Value Ref Range Status   11/09/2023 2.0 1.6 - 2.6 mg/dL Final   11/08/2023 1.8 1.6 - 2.6 mg/dL Final          amLODIPine, 10 mg, Oral, Q24H  [Held by provider] aspirin, 81 mg, Oral, Daily  atorvastatin, 40 mg, Oral, Nightly  carvedilol, 25 mg, Oral, Q12H  chlorhexidine, 15 mL, Mouth/Throat, Q12H  First Mouthwash (Magic Mouthwash), 10 mL, Swish & Spit, Q6H  [Held by provider] heparin (porcine), 5,000 Units, Subcutaneous, Q8H  insulin regular, " 2-7 Units, Subcutaneous, Q6H  Lacosamide, 50 mg, Intravenous, Q12H  levETIRAcetam, 250 mg, Intravenous, Once  levETIRAcetam, 250 mg, Intravenous, Q12H  mupirocin, , Each Nare, BID  pantoprazole, 40 mg, Intravenous, Q AM  senna-docusate sodium, 2 tablet, Oral, Nightly  sodium chloride, 4 mL, Nebulization, BID - RT      dexmedetomidine, 0.2-1.5 mcg/kg/hr, Last Rate: Stopped (11/07/23 0555)  niCARdipine, 5-15 mg/hr, Last Rate: Stopped (11/08/23 1130)  Pharmacy Consult - Pharmacy to dose,   sodium chloride, 30 mL/hr, Last Rate: 30 mL/hr (11/02/23 1400)              Dissecting ascending aortic aneurysm    Vitamin D deficiency    Thrombocytopenia    Hypertension secondary to other renal disorders    Pancytopenia    Systemic lupus erythematosus    Pericardial effusion    End stage renal disease on dialysis    Seizure disorder    Elevated liver function tests    Essential hypertension    Peritoneal dialysis catheter in place    Anemia due to chronic kidney disease, on chronic dialysis    Hyponatremia    Poor appetite    Moderate malnutrition    Chronic diastolic CHF (congestive heart failure)    Aortic valve lesion    Severe aortic valve regurgitation      Assessment & Plan    -Ascending aortic aneurysm with dissection- s/p ascending aortic dissection repair/proximal replacement, gacron interposition graft, kelli procedure; insertion of right femoral shiley- POD#8 Pagni  -cardiac tamponade- reexploration for bleeding, removal of large clot compressing the RV- POD#4 Camporrotondo  - severe aortic valve insufficiency  - ESRD on PD  - Lupus--on Cellcept/plaquenil; currently held  - hx of seizures  - chronic immunosuppression  - hypertension  - chronic anemia  - TCP--chronic     Remains intubated, following commands unable to pull good volumes and her nif was -7.5  Not ready for extubation  Keppra has been decreased  Sinus rhythm -- rate in the 80s   Plan for dialysis tomorrow  Tolerating tube feeds  Discontinue chest tubes    Continue supportive care       Delia tSreeter, CAESAR  11/10/23  07:11 EST

## 2023-11-10 NOTE — PROGRESS NOTES
Very poor SBT parameters. Very low NIF and VC. Not appropriate to extubate at this time.  We will see if there is enough pleural fluid to justify a left US guided thoracentesis to help her obtain better lung volumes.

## 2023-11-10 NOTE — PROGRESS NOTES
"Nutrition Services    Patient Name:  Dee Herrera  YOB: 1990  MRN: [id number]  Admit Date:  10/26/2023    Assessment Date:  11/10/23    Summary: TF follow up:  Current TF's: Novasource Renal @ 30 ml/hr (goal 35 ml/hr) w/ 30 ml q 4 hrs free water flushes via cortrak (ND). OGT removed.   1 BM 11/9   Attempting extubation now.  Meds reviewed, IVF@30ml/hr, protonix, pericolace, dulcolax prn   Labs reviewed, Hgb 8.7, Na 135, K 3.4, BUN 24, Crea 3.55, Phos 1.8, PLT 87, Alb 2.8, Gluc 111/99/114    Recommend:  Advance TF's to goal rate of Novasource Renal @ 35 ml/hr, with 30 ml q 4 hrs free water flushes or per renal team.   Replace K and Phos. Check e-lytes daily and replace as needed.     RD to follow closely.    CLINICAL NUTRITION ASSESSMENT      Reason for Assessment Follow-up Protocol, Tube Feeding Assessment      Diagnosis/Problem SOA, hyponatremia, ESRD on dialysis, abd pain     Anthropometrics        Current Height  Current Weight  BMI kg/m2 Height: 165 cm (64.96\")  Weight: 54.9 kg (121 lb 0.5 oz) (11/10/23 0422)  Body mass index is 20.17 kg/m².   Adjusted BMI (if applicable)    BMI Category Normal/Healthy (18.4 - 24.9)   Ideal Body Weight (IBW) 125lb   Usual Body Weight (UBW) 120-155   Weight Trend Loss, 5lbs recently, 35lb overall   --  Estimated/Assessed Needs        Current Weight  Weight: 55 kg (121 lb 4.1 oz) (11/06/23 1340)       Energy Requirements    Weight for Calculation 52.2 kg   Method for Estimation  30-35 kcal/kg   EST Needs (kcal/day) 5086-1836       Protein Requirements    Weight for Calculation 52.2 kg   EST Protein Needs (g/kg) 1.0 gm/kg, 1.5 gm/kg   EST Daily Needs (g/day) 52-78       Fluid Requirements     Method for Estimation 1 mL/kcal    EST Needs (mL/day)      Labs       Pertinent Labs    Results from last 7 days   Lab Units 11/10/23  0311 11/09/23  0306 11/08/23  0258 11/07/23  0256   SODIUM mmol/L 135* 137 136 140   POTASSIUM mmol/L 3.4* 3.5 3.6 4.0   CHLORIDE mmol/L 104 " 105 103 106   CO2 mmol/L 22.0 22.2 22.0 20.0*   BUN mg/dL 24* 29* 18 30*   CREATININE mg/dL 3.55* 4.58* 3.24* 4.75*   CALCIUM mg/dL 8.7 8.7 8.8 8.3*   BILIRUBIN mg/dL  --  0.3 0.4 0.3   ALK PHOS U/L  --  67 64 50   ALT (SGPT) U/L  --  <5 <5 <5   AST (SGOT) U/L  --  21 22 23   GLUCOSE mg/dL 117* 114* 124* 89     Results from last 7 days   Lab Units 11/10/23  0311 11/09/23  0306 11/08/23  0258 11/07/23  0256 11/06/23  1607 11/06/23  1312   MAGNESIUM mg/dL  --  2.0 1.8 1.9  --   --    PHOSPHORUS mg/dL 1.8* 2.8 3.1 5.2*   < > 5.6*   HEMOGLOBIN g/dL 8.7* 8.8* 10.0* 8.9*   < > 8.9*   HEMOGLOBIN, POC   --   --   --   --    < >  --    HEMATOCRIT % 26.3* 27.0* 30.0* 26.7*   < > 28.5*   HEMATOCRIT POC   --   --   --   --    < >  --    WBC 10*3/mm3 9.25 8.95 10.72 9.25   < > 13.01*   TRIGLYCERIDES mg/dL  --   --   --   --   --  202*   ALBUMIN g/dL 2.8* 2.9* 3.2* 2.8*   < > 2.9*  2.9*    < > = values in this interval not displayed.     Results from last 7 days   Lab Units 11/10/23  0311 11/09/23  0306 11/08/23  0258 11/07/23  0256 11/06/23  1754 11/06/23  1736 11/06/23  1631   INR   --   --   --   --  1.50* 1.6* 1.69*   APTT seconds  --   --   --   --  34.3  --  35.1   PLATELETS 10*3/mm3 87* 92* 112* 88* 92*  --  123*     COVID19   Date Value Ref Range Status   10/31/2023 Not Detected Not Detected - Ref. Range Final     Lab Results   Component Value Date    HGBA1C 5.10 10/30/2023          Medications           Scheduled Medications amLODIPine, 10 mg, Oral, Q24H  [Held by provider] aspirin, 81 mg, Oral, Daily  atorvastatin, 40 mg, Oral, Nightly  carvedilol, 25 mg, Oral, Q12H  chlorhexidine, 15 mL, Mouth/Throat, Q12H  First Mouthwash (Magic Mouthwash), 10 mL, Swish & Spit, Q6H  [Held by provider] heparin (porcine), 5,000 Units, Subcutaneous, Q8H  insulin regular, 2-7 Units, Subcutaneous, Q6H  Lacosamide, 50 mg, Intravenous, Q12H  levETIRAcetam, 250 mg, Intravenous, Once  levETIRAcetam, 250 mg, Intravenous, Q12H  mupirocin, ,  Each Nare, BID  pantoprazole, 40 mg, Intravenous, Q AM  senna-docusate sodium, 2 tablet, Oral, Nightly  sodium chloride, 4 mL, Nebulization, BID - RT       Infusions niCARdipine, 5-15 mg/hr, Last Rate: Stopped (23 1130)  sodium chloride, 30 mL/hr, Last Rate: 30 mL/hr (23 1400)       PRN Medications   acetaminophen **OR** acetaminophen **OR** acetaminophen    bisacodyl    bisacodyl    dextrose    dextrose    glucagon (human recombinant)    heparin (porcine)    hydrALAZINE    HYDROcodone-acetaminophen    ipratropium-albuterol    LORazepam    [] Morphine **AND** naloxone    nitroglycerin    ondansetron    polyethylene glycol    Potassium Replacement - Follow Nurse / BPA Driven Protocol     Physical Findings          General Findings somnolent, ventilator support   Oral/Mouth Cavity other:tongue swelling, lesions   Edema  1+ (trace)   Gastrointestinal normoactive, last bowel movement:    Skin  surgical incision: sternal   Tubes/Drains/Lines none, chest tube, Cortrak, dialysis catheter, ND tube, bridle in place @105 cm   NFPE See Malnutrition Severity Assessment   --  Malnutrition Severity Assessment      Patient meets criteria for : Moderate (non-severe) Malnutrition       Current Nutrition Orders & Evaluation of Intake       Oral Nutrition     Food Allergies NKFA   Current PO Diet NPO Diet NPO Type: Strict NPO   Supplement n/a   PO Evaluation     % PO Intake N/a    Factors Affecting Intake: altered respiratory status   --   Enteral Nutrition     Enteral Route ND    TF Delivery Method Continuous    Propofol Rate/Kcal     Current TF Order/Rate  Novasource Renal @ 30 mL/hr    TF Goal Rate 35 mL/hr     Current Water Flush 30 mL Q 4 hr    Modular None    TF Residual  n/a - tube is small bowel    TF Tolerance tolerating    TF Observation Verified correct TF and water flush infusing per orders     PES STATEMENT / NUTRITION DIAGNOSIS      Nutrition Dx Problem  Problem: Unintentional Weight Loss,  Malnutrition (moderate), and Inadequate Oral Intake  Etiology: Factors Affecting Nutrition - decrease appetite, abd pain    Signs/Symptoms: PO intake, NFPE Results, Unintended Weight Change, and Report/Observation     NUTRITION INTERVENTION / PLAN OF CARE      Intervention Goal(s) Maintain nutrition status, Reduce/improve symptoms, Meet estimated needs, Disease management/therapy, Initiate TF/PN, and Maintain weight         RD Intervention/Action Await initiation of EN/PN, Continue to monitor, Care plan reviewed, and Recommend/order: EN   --      Prescription/Orders:       PO Diet       Supplements       Enteral Nutrition    Enteral Prescription:     Enteral Route ND    TF Delivery Method Continuous    Enteral Product Novasource Renal    Modular None    Propofol Rate/Kcal     TF Start Rate  10 mL/hr     TF Goal Rate  35 mL/hr    Free Water Flush 30 mL Q 4 hr    Provision at Goal:          Calories 1680 kcal, meets 100 % needs         Protein  76 gm protein, meets 100 % needs         Fluid (mL) 605 mL free water + 180 mL in flushes    Prescription Ordered Yes         Parenteral Nutrition    New Prescription Ordered? Yes   --      Monitor/Evaluation Per protocol, I&O, Pertinent labs, EN delivery/tolerance, Weight, GI status, Symptoms, Swallow function, Hemodynamic stability   Discharge Plan/Needs Pending clinical course   --    RD to follow per protocol.      Electronically signed by:  Crystal Edwards RD  11/10/23 12:07 EST

## 2023-11-10 NOTE — PLAN OF CARE
Goal Outcome Evaluation:   Patient was extubated today at 1135 per Pulmonology and CT Surgery. Patient has maintained Oxygen saturations above 98% on 3-4L Nasal cannula. Respiratory rate has been 11-26 breaths/min and has been unlabored without the use of accessory muscles. Patient was able to do some physical Therapy this afternoon in Bed. Plan is to do aggressive Physical Therapy tomorrow and try to do some bedside exercises and marches per Dr. Medina. Bronchoscopy previously scheduled after extubation was canceled per Dr. Hay with Pulmonology after patient has exhibited no signs of respiratory distress following extubation. Hemodialysis is scheduled for tomorrow and orders have been called in by RN  @ 1400

## 2023-11-10 NOTE — CASE MANAGEMENT/SOCIAL WORK
Continued Stay Note  [facility]     Patient Name: Dee Herrera  MRN: [id number]  Today's Date: 11/10/2023    Admit Date: 10/26/2023    Plan: pending   Discharge Plan       Row Name 11/10/23 0819       Plan    Plan pending    Plan Comments Patient remains on vent. CCP continues to follow for discharge planning pending clinical course. Usama LOUIS RN CCP                   Discharge Codes    No documentation.                 Expected Discharge Date and Time       Expected Discharge Date Expected Discharge Time    Nov 14, 2023               Usama Pedro RN

## 2023-11-10 NOTE — THERAPY EVALUATION
Patient Name: Dee Herrera  : 1990    MRN: [id number]                              Today's Date: 11/10/2023       Admit Date: 10/26/2023    Visit Dx:     ICD-10-CM ICD-9-CM   1. Shortness of breath  R06.02 786.05   2. ESRD (end stage renal disease) on dialysis  N18.6 585.6    Z99.2 V45.11   3. Hyponatremia  E87.1 276.1   4. Generalized abdominal pain  R10.84 789.07   5. Elevated lactic acid level  R79.89 276.2   6. Elevated troponin  R79.89 790.6   7. Severe aortic valve regurgitation  I35.1 424.1   8. 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 syndrome    Systemic lupus erythematosus    Lupus nephritis, ISN/RPS class IV    Hypokalemia    Hypocalcemia    COVID-19    Hospital discharge follow-up    Stage 5 chronic kidney disease    Cardiac cirrhosis    Pancreatitis    Duodenitis    Regional enteritis of small bowel    Pericardial effusion    End stage renal disease on dialysis    Hemodialysis status    Seizure disorder    Elevated liver function tests    C. difficile colitis    Anemia, chronic disease    Essential hypertension    Peritoneal dialysis catheter in place    Anemia due to chronic kidney disease, on chronic dialysis    Alternating constipation and diarrhea    Abnormal stress test    Hyponatremia    Poor appetite    Moderate malnutrition    Chronic diastolic CHF (congestive heart failure)    Aortic valve lesion    Severe aortic valve regurgitation    Dissecting ascending aortic aneurysm     Past Medical History:   Diagnosis Date    Anasarca     PER CT SCAN    Dry skin     ESRD (end stage renal disease) on dialysis     TUES, THURS,  SAT UMAIR FRASER    History of abdominal pain     History of anemia     History of transfusion     Hypertension     Iron deficiency anemia 09/27/2021    Lupus (systemic lupus erythematosus) 07/30/2022    Migraine     Other specified nutritional anemias     Pericardial effusion     Renal insufficiency     Seizures     STATES LAST WAS 1/2023    Shortness of breath     OCCASIONAL    Vitamin D deficiency 09/27/2021     Past Surgical History:   Procedure Laterality Date    ASCENDING AORTIC ANEURYSM REPAIR W/ MECHANICAL AORTIC VALVE REPLACEMENT N/A 11/2/2023    Procedure: CHETNA STERNOTOMY, AORTIC ROOT REPLACEMENT WITH VALVE SPARING MISHEL PROCEDURE, REPLACEMENT OF ASCENDING AORTA, RIGHT FEMORAL DIALYSIS CATHETER PLACEMENT AND PRP;  Surgeon: Chris Medina MD;  Location: [facility] CVOR;  Service: Cardiothoracic;  Laterality: N/A;    CARDIAC CATHETERIZATION N/A 06/14/2023    Procedure: Coronary angiography;  Surgeon: Juana Taylor MD;  Location: [facility] CATH INVASIVE LOCATION;  Service: Cardiovascular;  Laterality: N/A;    CARDIAC CATHETERIZATION N/A 06/14/2023    Procedure: Left heart cath;  Surgeon: Juana Taylor MD;  Location: [facility] CATH INVASIVE LOCATION;  Service: Cardiovascular;  Laterality: N/A;    CARDIAC CATHETERIZATION N/A 06/14/2023    Procedure: Right Heart Cath;  Surgeon: Juana Taylor MD;  Location: [facility] CATH INVASIVE LOCATION;  Service: Cardiovascular;  Laterality: N/A;    COLONOSCOPY N/A 7/20/2023    Procedure: COLONOSCOPY to cecum with biopsy;  Surgeon: Drew Kaminski MD;  Location: [facility] ENDOSCOPY;  Service: Gastroenterology;  Laterality: N/A;  PRE - diarrhea, constipation  POST - fair prep, normal    CORONARY ARTERY BYPASS GRAFT N/A 11/6/2023    Procedure: STERNAL EXPLORATION AND WASH OUT;  Surgeon: Jr Mitesh Quiroz MD;  Location: [facility] CVOR;  Service: Cardiothoracic;  Laterality: N/A;    ENDOSCOPY N/A 7/20/2023    Procedure: ESOPHAGOGASTRODUODENOSCOPY with biopsy;  Surgeon: Gordy  MD Drew;  Location: [facility] ENDOSCOPY;  Service: Gastroenterology;  Laterality: N/A;  PRE - abn ct abd  POST - gastritis    INSERTION HEMODIALYSIS CATHETER N/A 07/26/2022    Procedure: RIGHT TUNNELED DIALYSIS CATHETER PLACEMENT;  Surgeon: Diandra Adhikari MD;  Location: [facility] MAIN OR;  Service: Vascular;  Laterality: N/A;    INSERTION PERITONEAL DIALYSIS CATHETER N/A 04/03/2023    Procedure: INSERTION PERITONEAL DIALYSIS CATHETER LAPAROSCOPIC, omentumpexy;  Surgeon: Jemal Loyola MD;  Location: [facility] OR;  Service: General;  Laterality: N/A;    TONSILLECTOMY        General Information       Row Name 11/10/23 1533          OT Time and Intention    Document Type evaluation  -RB     Mode of Treatment individual therapy;occupational therapy  -RB       Row Name 11/10/23 1533          General Information    Patient Profile Reviewed yes  -RB     Prior Level of Function independent:;ADL's;transfer  -RB     Existing Precautions/Restrictions fall;sternal;cardiac  -RB     Barriers to Rehab medically complex;previous functional deficit  -RB       Row Name 11/10/23 1533          Living Environment    People in Home parent(s)  -RB       Row Name 11/10/23 1533          Cognition    Orientation Status (Cognition) oriented x 3;other (see comments)  increased time - slow to respond  -RB       Row Name 11/10/23 1533          Safety Issues, Functional Mobility    Safety Issues Affecting Function (Mobility) safety precaution awareness;safety precautions follow-through/compliance;sequencing abilities  -RB     Impairments Affecting Function (Mobility) balance;coordination;endurance/activity tolerance;motor control;strength;shortness of breath;range of motion (ROM);postural/trunk control;pain  -RB               User Key  (r) = Recorded By, (t) = Taken By, (c) = Cosigned By      Initials Name Provider Type    RB Waleska Duarte OT Occupational Therapist                     Mobility/ADL's       Row Name 11/10/23  1533          Bed Mobility    Comment, (Bed Mobility) HOB raised to a modified chair position with HOB raised for upright participation  -       Row Name 11/10/23 1533          Functional Mobility    Functional Mobility- Ind. Level unable to perform;not appropriate to assess  -       Row Name 11/10/23 1533          Activities of Daily Living    BADL Assessment/Intervention --  The pt requires total A for ADL's - assist to feed several ice chips. Impaired hand to face coordination  -               User Key  (r) = Recorded By, (t) = Taken By, (c) = Cosigned By      Initials Name Provider Type    Waleska Tello OT Occupational Therapist                   Obj/Interventions       Row Name 11/10/23 1535          Sensory Assessment (Somatosensory)    Sensory Assessment (Somatosensory) sensation intact  -[facility] Name 11/10/23 1535          Vision Assessment/Intervention    Visual Impairment/Limitations WFL  -[facility] Name 11/10/23 1535          Range of Motion Comprehensive    Comment, General Range of Motion PROM WFL. Poor active movement of her legs  -[facility] Name 11/10/23 1535          Strength Comprehensive (MMT)    Comment, General Manual Muscle Testing (MMT) Assessment Generalized weakness in her arms and legs.  -       Row Name 11/10/23 1535          Shoulder (Therapeutic Exercise)    Shoulder (Therapeutic Exercise) AAROM (active assistive range of motion)  -[facility] Name 11/10/23 1535          Elbow/Forearm (Therapeutic Exercise)    Elbow/Forearm (Therapeutic Exercise) AAROM (active assistive range of motion)  -     Elbow/Forearm AAROM (Therapeutic Exercise) bilateral;flexion;extension;10 repetitions;supine  -       Row Name 11/10/23 1535          Wrist (Therapeutic Exercise)    Wrist (Therapeutic Exercise) AAROM (active assistive range of motion)  -     Wrist AAROM (Therapeutic Exercise) bilateral;flexion;extension;10 repetitions  -       Row Name 11/10/23 1535          Hand  (Therapeutic Exercise)    Hand (Therapeutic Exercise) AROM (active range of motion)  -RB     Hand AROM/AAROM (Therapeutic Exercise) AROM (active range of motion);finger flexion;finger extension;10 repetitions;2 sets  -RB       Row Name 11/10/23 1535          Motor Skills    Therapeutic Exercise shoulder;elbow/forearm;wrist;hand  -RB               User Key  (r) = Recorded By, (t) = Taken By, (c) = Cosigned By      Initials Name Provider Type    RB Waleska Duarte OT Occupational Therapist                   Goals/Plan       Row Name 11/10/23 1537          Bed Mobility Goal 1 (OT)    Activity/Assistive Device (Bed Mobility Goal 1, OT) bed mobility activities, all  -RB     [place] Level/Cues Needed (Bed Mobility Goal 1, OT) minimum assist (75% or more patient effort)  -RB     Time Frame (Bed Mobility Goal 1, OT) short term goal (STG);2 weeks  -RB     Progress/Outcomes (Bed Mobility Goal 1, OT) continuing progress toward goal  -RB       Row Name 11/10/23 1537          Transfer Goal 1 (OT)    Activity/Assistive Device (Transfer Goal 1, OT) transfers, all  -RB     [place] Level/Cues Needed (Transfer Goal 1, OT) minimum assist (75% or more patient effort)  -RB     Time Frame (Transfer Goal 1, OT) short term goal (STG);2 weeks  -RB     Progress/Outcome (Transfer Goal 1, OT) continuing progress toward goal  -RB       Row Name 11/10/23 1537          Dressing Goal 1 (OT)    Activity/Device (Dressing Goal 1, OT) dressing skills, all  -RB     [place]/Cues Needed (Dressing Goal 1, OT) minimum assist (75% or more patient effort)  -RB     Time Frame (Dressing Goal 1, OT) short term goal (STG);2 weeks  -RB     Progress/Outcome (Dressing Goal 1, OT) continuing progress toward goal  -RB       Row Name 11/10/23 1537          Toileting Goal 1 (OT)    Activity/Device (Toileting Goal 1, OT) toileting skills, all  -RB     [place] Level/Cues Needed (Toileting Goal 1, OT) minimum assist (75% or more patient effort)  -RB      Time Frame (Toileting Goal 1, OT) short term goal (STG);2 weeks  -RB     Progress/Outcome (Toileting Goal 1, OT) continuing progress toward goal  -RB       Row Name 11/10/23 1537          Grooming Goal 1 (OT)    Activity/Device (Grooming Goal 1, OT) grooming skills, all  -RB     [place] (Grooming Goal 1, OT) minimum assist (75% or more patient effort)  -RB     Time Frame (Grooming Goal 1, OT) 2 weeks  -RB     Progress/Outcome (Grooming Goal 1, OT) continuing progress toward goal  -RB       Row Name 11/10/23 1537          Self-Feeding Goal 1 (OT)    Activity/Device (Self-Feeding Goal 1, OT) self-feeding skills, all  -RB     [place] Level/Cues Needed (Self-Feeding Goal 1, OT) minimum assist (75% or more patient effort)  -RB     Time Frame (Self-Feeding Goal 1, OT) short term goal (STG);2 weeks  -RB     Progress/Outcomes (Self-Feeding Goal 1, OT) continuing progress toward goal  -RB       Row Name 11/10/23 1537          Therapy Assessment/Plan (OT)    Planned Therapy Interventions (OT) activity tolerance training;BADL retraining;functional balance retraining;occupation/activity based interventions;patient/caregiver education/training;transfer/mobility retraining;strengthening exercise;ROM/therapeutic exercise;adaptive equipment training  -RB               User Key  (r) = Recorded By, (t) = Taken By, (c) = Cosigned By      Initials Name Provider Type    RB Waleska Duarte, OT Occupational Therapist                   Clinical Impression       Row Name 11/10/23 1536          Pain Assessment    Pretreatment Pain Rating 0/10 - no pain  -RB     Posttreatment Pain Rating 0/10 - no pain  -RB       Row Name 11/10/23 1536          Plan of Care Review    Plan of Care Reviewed With patient  -RB     Progress no change  -RB     Outcome Evaluation The pt was admitted to [facility] and is s/p aortic valve/ascending aortic surgery on 11/2 and a Reexploration for bleeding on 11/6. Extubated on 11/10. Pmhx significant for ESRD  on PD, seizure disorder, Lupus, hypertension, chronic anemia, and migraines. She lives with her mom and reports independence at baseline. OT orders were placed today to begin movement. She is very weak in her arms and legs - she tolerated both active and active assist ROM in functional patterns. She would benefit from L'nard or waffle boots to prevent further drop foot. Dr. Medina cleared EOB sitting/bedside standing for the weekend. Rehab recommended.  -RB       Row Name 11/10/23 1536          Therapy Assessment/Plan (OT)    Rehab Potential (OT) good, to achieve stated therapy goals  -RB     Criteria for Skilled Therapeutic Interventions Met (OT) yes;skilled treatment is necessary  -RB     Therapy Frequency (OT) 5 times/wk  -RB       Row Name 11/10/23 1536          Therapy Plan Review/Discharge Plan (OT)    Anticipated Discharge Disposition (OT) inpatient rehabilitation facility  -RB       Row Name 11/10/23 1535          Vital Signs    O2 Delivery Pre Treatment supplemental O2  -RB     O2 Delivery Intra Treatment supplemental O2  -RB     O2 Delivery Post Treatment supplemental O2  -RB     Pre Patient Position Supine  -RB     Intra Patient Position Supine  HOB raised  -RB     Post Patient Position Supine  -RB     Recovery Time Needs ~1-2 minutes in between exercise sets  -RB       Row Name 11/10/23 1539          Positioning and Restraints    Pre-Treatment Position in bed  -RB     Post Treatment Position bed  -RB     In Bed notified nsg;supine;call light within reach;encouraged to call for assist;with nsg  -RB               User Key  (r) = Recorded By, (t) = Taken By, (c) = Cosigned By      Initials Name Provider Type    RB Waleska Duarte, MADELIN Occupational Therapist                   Outcome Measures       Row Name 11/10/23 6858          How much help from another is currently needed...    Putting on and taking off regular lower body clothing? 1  -RB     Bathing (including washing, rinsing, and drying) 1  -RB      Toileting (which includes using toilet bed pan or urinal) 1  -RB     Putting on and taking off regular upper body clothing 1  -RB     Taking care of personal grooming (such as brushing teeth) 1  -RB     Eating meals 1  -RB     AM-PAC 6 Clicks Score (OT) 6  -RB       Row Name 11/10/23 1538          Modified Linda Scale    Modified Linda Scale 5 - Severe disability.  Bedridden, incontinent, and requiring constant nursing care and attention.  -RB       Row Name 11/10/23 1538          Functional Assessment    Outcome Measure Options AM-PAC 6 Clicks Daily Activity (OT);Modified [place]  -RB               User Key  (r) = Recorded By, (t) = Taken By, (c) = Cosigned By      Initials Name Provider Type    Waleska Tello OT Occupational Therapist                    Occupational Therapy Education       Title: PT OT SLP Therapies (Not Started)       Topic: Occupational Therapy (Not Started)       Point: ADL training (Not Started)       Description:   Instruct learner(s) on proper safety adaptation and remediation techniques during self care or transfers.   Instruct in proper use of assistive devices.                  Learner Progress:  Not documented in this visit.              Point: Home exercise program (Not Started)       Description:   Instruct learner(s) on appropriate technique for monitoring, assisting and/or progressing therapeutic exercises/activities.                  Learner Progress:  Not documented in this visit.              Point: Precautions (Not Started)       Description:   Instruct learner(s) on prescribed precautions during self-care and functional transfers.                  Learner Progress:  Not documented in this visit.              Point: Body mechanics (Not Started)       Description:   Instruct learner(s) on proper positioning and spine alignment during self-care, functional mobility activities and/or exercises.                  Learner Progress:  Not documented in this visit.                                   OT Recommendation and Plan  Planned Therapy Interventions (OT): activity tolerance training, BADL retraining, functional balance retraining, occupation/activity based interventions, patient/caregiver education/training, transfer/mobility retraining, strengthening exercise, ROM/therapeutic exercise, adaptive equipment training  Therapy Frequency (OT): 5 times/wk  Plan of Care Review  Plan of Care Reviewed With: patient  Progress: no change  Outcome Evaluation: The pt was admitted to [facility] and is s/p aortic valve/ascending aortic surgery on 11/2 and a Reexploration for bleeding on 11/6. Extubated on 11/10. Pmhx significant for ESRD on PD, seizure disorder, Lupus, hypertension, chronic anemia, and migraines. She lives with her mom and reports independence at baseline. OT orders were placed today to begin movement. She is very weak in her arms and legs - she tolerated both active and active assist ROM in functional patterns. She would benefit from L'nard or waffle boots to prevent further drop foot. Dr. Medina cleared EOB sitting/bedside standing for the weekend. Rehab recommended.     Time Calculation:   Evaluation Complexity (OT)  Review Occupational Profile/Medical/Therapy History Complexity: extensive/high complexity  Assessment, Occupational Performance/Identification of Deficit Complexity: 5 or more performance deficits  Clinical Decision Making Complexity (OT): detailed assessment/moderate complexity  Overall Complexity of Evaluation (OT): high complexity     Time Calculation- OT       Row Name 11/10/23 1532 11/10/23 1211          Time Calculation- OT    OT Start Time 1500  -RB --     OT Stop Time 1525  -RB --     OT Time Calculation (min) 25 min  -RB --     Total Timed Code Minutes- OT 10 minute(s)  -RB --     OT Received On 11/10/23  -RB --     OT - Next Appointment 11/13/23  -RB 11/11/23  -MW     OT Goal Re-Cert Due Date 11/24/23  -RB --        Timed Charges    36771 - OT Therapeutic Exercise  Minutes 10  -RB --        Untimed Charges    OT Eval/Re-eval Minutes 15  -RB --        Total Minutes    Timed Charges Total Minutes 10  -RB --     Untimed Charges Total Minutes 15  -RB --      Total Minutes 25  -RB --               User Key  (r) = Recorded By, (t) = Taken By, (c) = Cosigned By      Initials Name Provider Type    Waleska Tello OT Occupational Therapist     Elaina Fisher OT Occupational Therapist                  Therapy Charges for Today       Code Description Service Date Service Provider Modifiers Qty    [id number]  OT EVAL HIGH COMPLEXITY 3 11/10/2023 Waleska Duarte OT GO 1    [id number]  OT THER PROC EA 15 MIN 11/10/2023 Waleska Duarte OT GO 1                 Waleska Duarte OT  11/10/2023

## 2023-11-10 NOTE — PROGRESS NOTES
[facility] Progress Note      Patient Name: Dee Herrera  : 1990  MRN: [id number]  Primary Care Physician:  Margarita Woods APRN  Date of admission: 10/26/2023    Subjective     Interval History:   Follow-up end-stage renal disease on peritoneal dialysis      The patient remains on the ventilator, has been failing weaning attempts she had good blood gases, following simple commands, had dialysis yesterday    Review of Systems:   As noted above    Objective     Vitals:   Temp:  [98.2 °F (36.8 °C)-100.2 °F (37.9 °C)] 99.3 °F (37.4 °C)  Heart Rate:  [81-96] 89  Resp:  [21-30] 27  BP: (109-131)/(74-96) 126/90  FiO2 (%):  [39 %-40 %] 40 %    Intake/Output Summary (Last 24 hours) at 11/10/2023 0909  Last data filed at 11/10/2023 0800  Gross per 24 hour   Intake 1098.81 ml   Output 940 ml   Net 158.81 ml       Physical Exam:    General Appearance: On the ventilator, arousable, chronically ill and frail  Skin: warm and dry  HEENT: Orally intubated  Neck: No JVD  Lungs: Scattered rhonchi unlabored breathing effort  Heart: RRR, faint rub  Abdomen: soft, no guarding nondistended, normoactive bowels and PD catheter in place with clean exit site  Extremities: no edema, cyanosis or clubbing, temporary dialysis catheter in the right femoral vein  Neuro: Following simple commands    Scheduled Meds:     amLODIPine, 10 mg, Oral, Q24H  [Held by provider] aspirin, 81 mg, Oral, Daily  atorvastatin, 40 mg, Oral, Nightly  carvedilol, 25 mg, Oral, Q12H  chlorhexidine, 15 mL, Mouth/Throat, Q12H  First Mouthwash (Magic Mouthwash), 10 mL, Swish & Spit, Q6H  [Held by provider] heparin (porcine), 5,000 Units, Subcutaneous, Q8H  insulin regular, 2-7 Units, Subcutaneous, Q6H  Lacosamide, 50 mg, Intravenous, Q12H  levETIRAcetam, 250 mg, Intravenous, Once  levETIRAcetam, 250 mg, Intravenous, Q12H  mupirocin, , Each Nare, BID  pantoprazole, 40 mg, Intravenous, Q AM  senna-docusate sodium, 2 tablet, Oral,  Nightly  sodium chloride, 4 mL, Nebulization, BID - RT      IV Meds:   dexmedetomidine, 0.2-1.5 mcg/kg/hr, Last Rate: Stopped (11/07/23 0555)  niCARdipine, 5-15 mg/hr, Last Rate: Stopped (11/08/23 1130)  Pharmacy Consult - Pharmacy to dose,   sodium chloride, 30 mL/hr, Last Rate: 30 mL/hr (11/02/23 1400)        Results Reviewed:   I have personally reviewed the results from the time of this admission to 11/10/2023 09:09 EST     Results from last 7 days   Lab Units 11/10/23  0311 11/09/23  0306 11/08/23  0258 11/07/23  0256   SODIUM mmol/L 135* 137 136 140   POTASSIUM mmol/L 3.4* 3.5 3.6 4.0   CHLORIDE mmol/L 104 105 103 106   CO2 mmol/L 22.0 22.2 22.0 20.0*   BUN mg/dL 24* 29* 18 30*   CREATININE mg/dL 3.55* 4.58* 3.24* 4.75*   CALCIUM mg/dL 8.7 8.7 8.8 8.3*   BILIRUBIN mg/dL  --  0.3 0.4 0.3   ALK PHOS U/L  --  67 64 50   ALT (SGPT) U/L  --  <5 <5 <5   AST (SGOT) U/L  --  21 22 23   GLUCOSE mg/dL 117* 114* 124* 89       Estimated Creatinine Clearance: 19.9 mL/min (A) (by C-G formula based on SCr of 3.55 mg/dL (H)).    Results from last 7 days   Lab Units 11/10/23  0311 11/09/23  0306 11/08/23  0258 11/07/23  0256   MAGNESIUM mg/dL  --  2.0 1.8 1.9   PHOSPHORUS mg/dL 1.8* 2.8 3.1 5.2*             Results from last 7 days   Lab Units 11/10/23  0311 11/09/23  0306 11/08/23 0258 11/07/23  0256 11/06/23  1754   WBC 10*3/mm3 9.25 8.95 10.72 9.25 8.73   HEMOGLOBIN g/dL 8.7* 8.8* 10.0* 8.9* 8.6*   PLATELETS 10*3/mm3 87* 92* 112* 88* 92*       Results from last 7 days   Lab Units 11/06/23  1754 11/06/23  1736 11/06/23  1631   INR  1.50* 1.6* 1.69*       Assessment / Plan     ASSESSMENT:  End-stage renal disease on secondary to lupus nephritis on peritoneal dialysis, she was switched since her surgery to hemodialysis until she is up and moving then we will switch her back to PD.  She had dialysis yesterday, potassium today 3.4 and phosphorus 1.8.  hyponatremia associated with excessive water intake, resolved, sodium today  is 137  Cardiomyopathy ejection fraction about 40%  Thrombocytopenia, platelet today 88,000  Anemia of chronic kidney disease hemoglobin today is 8.7  SLE.  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    I reviewed the chart and other providers note, I reviewed imaging and lab data.  Copied text in this note has been reviewed and is accurate as of 11/10/23.       Thank you for involving us in the care of Dee Herrera.  Please feel free to call with any questions.    Isaac Diaz MD  11/10/23  09:09 [facility]    [facility]  [phone]    Please note that portions of this note were completed with a voice recognition program.

## 2023-11-11 ENCOUNTER — APPOINTMENT (OUTPATIENT)
Dept: GENERAL RADIOLOGY | Facility: HOSPITAL | Age: 33
DRG: 219 | End: 2023-11-11
Payer: MEDICARE

## 2023-11-11 LAB
ALBUMIN SERPL-MCNC: 2.7 G/DL (ref 3.5–5.2)
ANION GAP SERPL CALCULATED.3IONS-SCNC: 10.5 MMOL/L (ref 5–15)
BASOPHILS # BLD AUTO: 0.02 10*3/MM3 (ref 0–0.2)
BASOPHILS NFR BLD AUTO: 0.2 % (ref 0–1.5)
BUN SERPL-MCNC: 42 MG/DL (ref 6–20)
BUN/CREAT SERPL: 8.8 (ref 7–25)
CALCIUM SPEC-SCNC: 8.4 MG/DL (ref 8.6–10.5)
CHLORIDE SERPL-SCNC: 104 MMOL/L (ref 98–107)
CO2 SERPL-SCNC: 21.5 MMOL/L (ref 22–29)
CREAT SERPL-MCNC: 4.76 MG/DL (ref 0.57–1)
DEPRECATED RDW RBC AUTO: 48.7 FL (ref 37–54)
EGFRCR SERPLBLD CKD-EPI 2021: 11.9 ML/MIN/1.73
EOSINOPHIL # BLD AUTO: 0.02 10*3/MM3 (ref 0–0.4)
EOSINOPHIL NFR BLD AUTO: 0.2 % (ref 0.3–6.2)
ERYTHROCYTE [DISTWIDTH] IN BLOOD BY AUTOMATED COUNT: 15.4 % (ref 12.3–15.4)
GLUCOSE BLDC GLUCOMTR-MCNC: 102 MG/DL (ref 70–130)
GLUCOSE BLDC GLUCOMTR-MCNC: 106 MG/DL (ref 70–130)
GLUCOSE BLDC GLUCOMTR-MCNC: 73 MG/DL (ref 70–130)
GLUCOSE BLDC GLUCOMTR-MCNC: 81 MG/DL (ref 70–130)
GLUCOSE BLDC GLUCOMTR-MCNC: 90 MG/DL (ref 70–130)
GLUCOSE SERPL-MCNC: 122 MG/DL (ref 65–99)
HCT VFR BLD AUTO: 25.3 % (ref 34–46.6)
HGB BLD-MCNC: 8.5 G/DL (ref 12–15.9)
LYMPHOCYTES # BLD AUTO: 0.39 10*3/MM3 (ref 0.7–3.1)
LYMPHOCYTES NFR BLD AUTO: 4 % (ref 19.6–45.3)
MAGNESIUM SERPL-MCNC: 1.9 MG/DL (ref 1.6–2.6)
MCH RBC QN AUTO: 28.8 PG (ref 26.6–33)
MCHC RBC AUTO-ENTMCNC: 33.6 G/DL (ref 31.5–35.7)
MCV RBC AUTO: 85.8 FL (ref 79–97)
MONOCYTES # BLD AUTO: 1.17 10*3/MM3 (ref 0.1–0.9)
MONOCYTES NFR BLD AUTO: 11.9 % (ref 5–12)
NEUTROPHILS NFR BLD AUTO: 8.11 10*3/MM3 (ref 1.7–7)
NEUTROPHILS NFR BLD AUTO: 82.1 % (ref 42.7–76)
PHOSPHATE SERPL-MCNC: 0.6 MG/DL (ref 2.5–4.5)
PHOSPHATE SERPL-MCNC: 1.4 MG/DL (ref 2.5–4.5)
PLATELET # BLD AUTO: 86 10*3/MM3 (ref 140–450)
PMV BLD AUTO: 11 FL (ref 6–12)
POTASSIUM SERPL-SCNC: 3.2 MMOL/L (ref 3.5–5.2)
PROCALCITONIN SERPL-MCNC: 5.05 NG/ML (ref 0–0.25)
RBC # BLD AUTO: 2.95 10*6/MM3 (ref 3.77–5.28)
SODIUM SERPL-SCNC: 136 MMOL/L (ref 136–145)
WBC NRBC COR # BLD: 9.87 10*3/MM3 (ref 3.4–10.8)

## 2023-11-11 PROCEDURE — 85025 COMPLETE CBC W/AUTO DIFF WBC: CPT | Performed by: INTERNAL MEDICINE

## 2023-11-11 PROCEDURE — 71045 X-RAY EXAM CHEST 1 VIEW: CPT

## 2023-11-11 PROCEDURE — 82948 REAGENT STRIP/BLOOD GLUCOSE: CPT

## 2023-11-11 PROCEDURE — 83735 ASSAY OF MAGNESIUM: CPT | Performed by: INTERNAL MEDICINE

## 2023-11-11 PROCEDURE — C9254 INJECTION, LACOSAMIDE: HCPCS

## 2023-11-11 PROCEDURE — 25010000002 LEVETRIRACETAM PER 10 MG: Performed by: INTERNAL MEDICINE

## 2023-11-11 PROCEDURE — 84145 PROCALCITONIN (PCT): CPT | Performed by: INTERNAL MEDICINE

## 2023-11-11 PROCEDURE — 94799 UNLISTED PULMONARY SVC/PX: CPT

## 2023-11-11 PROCEDURE — 25810000003 SODIUM CHLORIDE 0.9 % SOLUTION: Performed by: INTERNAL MEDICINE

## 2023-11-11 PROCEDURE — 92610 EVALUATE SWALLOWING FUNCTION: CPT

## 2023-11-11 PROCEDURE — 25010000002 LACOSAMIDE 200 MG/20ML SOLUTION

## 2023-11-11 PROCEDURE — 80069 RENAL FUNCTION PANEL: CPT | Performed by: INTERNAL MEDICINE

## 2023-11-11 PROCEDURE — 84100 ASSAY OF PHOSPHORUS: CPT | Performed by: INTERNAL MEDICINE

## 2023-11-11 RX ORDER — SODIUM PHOSPHATE IN 0.9 % NACL 15MMOL/100
15 PLASTIC BAG, INJECTION (ML) INTRAVENOUS
Status: COMPLETED | OUTPATIENT
Start: 2023-11-11 | End: 2023-11-12

## 2023-11-11 RX ORDER — FENTANYL/ROPIVACAINE/NS/PF 2-625MCG/1
15 PLASTIC BAG, INJECTION (ML) EPIDURAL ONCE
Status: COMPLETED | OUTPATIENT
Start: 2023-11-11 | End: 2023-11-11

## 2023-11-11 RX ADMIN — LACOSAMIDE 50 MG: 10 INJECTION INTRAVENOUS at 11:48

## 2023-11-11 RX ADMIN — CARVEDILOL 25 MG: 25 TABLET, FILM COATED ORAL at 21:37

## 2023-11-11 RX ADMIN — Medication 15 MMOL: at 06:37

## 2023-11-11 RX ADMIN — IPRATROPIUM BROMIDE AND ALBUTEROL SULFATE 3 ML: 2.5; .5 SOLUTION RESPIRATORY (INHALATION) at 20:08

## 2023-11-11 RX ADMIN — CARVEDILOL 25 MG: 25 TABLET, FILM COATED ORAL at 09:13

## 2023-11-11 RX ADMIN — LACOSAMIDE 50 MG: 10 INJECTION INTRAVENOUS at 00:13

## 2023-11-11 RX ADMIN — 0.12% CHLORHEXIDINE GLUCONATE 15 ML: 1.2 RINSE ORAL at 21:37

## 2023-11-11 RX ADMIN — LEVETIRACETAM 250 MG: 500 INJECTION, SOLUTION INTRAVENOUS at 21:38

## 2023-11-11 RX ADMIN — DIPHENHYDRAMINE HYDROCHLORIDE AND LIDOCAINE HYDROCHLORIDE AND ALUMINUM HYDROXIDE AND MAGNESIUM HYDRO 10 ML: KIT at 09:12

## 2023-11-11 RX ADMIN — DIPHENHYDRAMINE HYDROCHLORIDE AND LIDOCAINE HYDROCHLORIDE AND ALUMINUM HYDROXIDE AND MAGNESIUM HYDRO 10 ML: KIT at 21:37

## 2023-11-11 RX ADMIN — MUPIROCIN 1 APPLICATION: 20 OINTMENT TOPICAL at 21:37

## 2023-11-11 RX ADMIN — Medication 4 ML: at 20:12

## 2023-11-11 RX ADMIN — LEVETIRACETAM 250 MG: 500 INJECTION, SOLUTION INTRAVENOUS at 09:12

## 2023-11-11 RX ADMIN — DIPHENHYDRAMINE HYDROCHLORIDE AND LIDOCAINE HYDROCHLORIDE AND ALUMINUM HYDROXIDE AND MAGNESIUM HYDRO 10 ML: KIT at 17:06

## 2023-11-11 RX ADMIN — MUPIROCIN 1 APPLICATION: 20 OINTMENT TOPICAL at 09:14

## 2023-11-11 RX ADMIN — PANTOPRAZOLE SODIUM 40 MG: 40 INJECTION, POWDER, FOR SOLUTION INTRAVENOUS at 06:37

## 2023-11-11 RX ADMIN — SODIUM PHOSPHATE, MONOBASIC, MONOHYDRATE AND SODIUM PHOSPHATE, DIBASIC, ANHYDROUS 15 MMOL: 276; 142 INJECTION, SOLUTION INTRAVENOUS at 20:30

## 2023-11-11 RX ADMIN — 0.12% CHLORHEXIDINE GLUCONATE 15 ML: 1.2 RINSE ORAL at 09:12

## 2023-11-11 RX ADMIN — DIPHENHYDRAMINE HYDROCHLORIDE AND LIDOCAINE HYDROCHLORIDE AND ALUMINUM HYDROXIDE AND MAGNESIUM HYDRO 10 ML: KIT at 03:13

## 2023-11-11 RX ADMIN — SODIUM PHOSPHATE, MONOBASIC, MONOHYDRATE AND SODIUM PHOSPHATE, DIBASIC, ANHYDROUS 15 MMOL: 276; 142 INJECTION, SOLUTION INTRAVENOUS at 23:27

## 2023-11-11 RX ADMIN — ATORVASTATIN CALCIUM 40 MG: 20 TABLET, FILM COATED ORAL at 21:37

## 2023-11-11 RX ADMIN — LACOSAMIDE 50 MG: 10 INJECTION INTRAVENOUS at 23:25

## 2023-11-11 NOTE — NURSING NOTE
HD completed and tolerated.  CVC dsg CDI.  CVC locked, clamped, capped.  2L removed        Basia Phillips  [facility]

## 2023-11-11 NOTE — PLAN OF CARE
Goal Outcome Evaluation:         BSE completed - patient presents with functional oropharyngeal swallow and is appropriate for mechanical soft/thin liquids, meds whole or crushed w/puree or thin liquids, upright for PO, alternate liquids/solids.  Pt refused regular trial.  If patient demonstrates aspiration concern, please return to NPO and notify SLP.  ST to follow for diet tolerance.

## 2023-11-11 NOTE — PLAN OF CARE
Goal Outcome Evaluation:         BSE orders received, attempt to see patient - receiving dialysis, asked to return after completion.  ST to continue to follow.

## 2023-11-11 NOTE — PROGRESS NOTES
[facility] Progress Note      Patient Name: Dee Herrera  : 1990  MRN: [id number]  Primary Care Physician:  Margarita Woods APRN  Date of admission: 10/26/2023    Subjective     Interval History:   Follow-up end-stage renal disease, patient was on peritoneal dialysis but switched to hemodialysis in the hospital    The patient got extubated.  She is more awake and responsive but still slow in her responses.  She has NG tube in place and getting tube feeding.  She is not requiring any pressors.  She was getting prepared to start hemodialysis when I saw her today.    She has no fever.    Review of Systems:   As noted above    Objective     Vitals:   Temp:  [97.9 °F (36.6 °C)-98.9 °F (37.2 °C)] 98.5 °F (36.9 °C)  Heart Rate:  [] 98  Resp:  [14-27] 20  BP: (112-133)/(79-99) 127/99  Flow (L/min):  [3-4] 3  FiO2 (%):  [40 %] 40 %    Intake/Output Summary (Last 24 hours) at 2023 0808  Last data filed at 2023 0600  Gross per 24 hour   Intake 1128 ml   Output 0 ml   Net 1128 ml       Physical Exam:    General Appearance: Slow in her responses, arousable, chronically ill and frail  Skin: warm and dry  HEENT: Supple neck, has NG tube in place  Neck: No JVD  Lungs: Scattered rhonchi unlabored breathing effort  Heart: RRR, faint rub  Abdomen: soft, no guarding nondistended, normoactive bowels and PD catheter in place with clean exit site  Extremities: no edema, cyanosis or clubbing, temporary dialysis catheter in the right femoral vein  Neuro: Following simple commands    Scheduled Meds:     amLODIPine, 10 mg, Oral, Q24H  [Held by provider] aspirin, 81 mg, Oral, Daily  atorvastatin, 40 mg, Oral, Nightly  carvedilol, 25 mg, Oral, Q12H  chlorhexidine, 15 mL, Mouth/Throat, Q12H  First Mouthwash (Magic Mouthwash), 10 mL, Swish & Spit, Q6H  [Held by provider] heparin (porcine), 5,000 Units, Subcutaneous, Q8H  insulin regular, 2-7 Units, Subcutaneous, Q6H  Lacosamide, 50 mg,  Intravenous, Q12H  levETIRAcetam, 250 mg, Intravenous, Once  levETIRAcetam, 250 mg, Intravenous, Q12H  mupirocin, , Each Nare, BID  pantoprazole, 40 mg, Intravenous, Q AM  potassium phosphate, 15 mmol, Intravenous, Once  senna-docusate sodium, 2 tablet, Oral, Nightly  sodium chloride, 4 mL, Nebulization, BID - RT      IV Meds:   niCARdipine, 5-15 mg/hr, Last Rate: Stopped (11/08/23 1130)  sodium chloride, 30 mL/hr, Last Rate: 30 mL/hr (11/02/23 1400)        Results Reviewed:   I have personally reviewed the results from the time of this admission to 11/11/2023 08:08 EST     Results from last 7 days   Lab Units 11/11/23  0309 11/10/23  0311 11/09/23  0306 11/08/23  0258 11/07/23  0256   SODIUM mmol/L 136 135* 137 136 140   POTASSIUM mmol/L 3.2* 3.4* 3.5 3.6 4.0   CHLORIDE mmol/L 104 104 105 103 106   CO2 mmol/L 21.5* 22.0 22.2 22.0 20.0*   BUN mg/dL 42* 24* 29* 18 30*   CREATININE mg/dL 4.76* 3.55* 4.58* 3.24* 4.75*   CALCIUM mg/dL 8.4* 8.7 8.7 8.8 8.3*   BILIRUBIN mg/dL  --   --  0.3 0.4 0.3   ALK PHOS U/L  --   --  67 64 50   ALT (SGPT) U/L  --   --  <5 <5 <5   AST (SGOT) U/L  --   --  21 22 23   GLUCOSE mg/dL 122* 117* 114* 124* 89       Estimated Creatinine Clearance: 14.3 mL/min (A) (by C-G formula based on SCr of 4.76 mg/dL (H)).    Results from last 7 days   Lab Units 11/11/23  0309 11/10/23  0311 11/09/23  0306 11/08/23  0258   MAGNESIUM mg/dL 1.9  --  2.0 1.8   PHOSPHORUS mg/dL 1.4* 1.8* 2.8 3.1             Results from last 7 days   Lab Units 11/11/23  0309 11/10/23  0311 11/09/23  0306 11/08/23  0258 11/07/23  0256   WBC 10*3/mm3 9.87 9.25 8.95 10.72 9.25   HEMOGLOBIN g/dL 8.5* 8.7* 8.8* 10.0* 8.9*   PLATELETS 10*3/mm3 86* 87* 92* 112* 88*       Results from last 7 days   Lab Units 11/06/23  1754 11/06/23  1736 11/06/23  1631   INR  1.50* 1.6* 1.69*       Assessment / Plan     ASSESSMENT:  End-stage renal disease secondary to lupus nephritis who was on peritoneal dialysis, she was switched since her  surgery to hemodialysis until she is up and moving then we will switch her back to PD.  She is getting hemodialysis today  Hyponatremia associated with excessive water intake, resolved, sodium is better  Cardiomyopathy ejection fraction about 40%  Thrombocytopenia, platelet today 86,000  Anemia of chronic kidney disease hemoglobin today is 8.5  SLE.  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  She then developed cardiac tamponade and had reexploration done with removal of large clot compressing the right ventricle  Seizure disorder  Hypertension with ESRD, reasonably controlled    PLAN:  Continue the same treatment  Hemodialysis today with 4K bath  Replete potassium and phosphorus as needed  Surveillance lab  I discussed the case with the patient's nurse .    I reviewed the chart and other providers note, I reviewed imaging and lab data.  Copied text in this note has been reviewed and is accurate as of 11/11/23.     Thank you for involving us in the care of Dee Herrera.  Please feel free to call with any questions.    Danika Smith MD  11/11/23  08:08 [facility]    [facility]  [phone]    Please note that portions of this note were completed with a voice recognition program.

## 2023-11-11 NOTE — THERAPY EVALUATION
Acute Care - Speech Language Pathology   Swallow Initial Evaluation [facility]     Patient Name: Dee Herrera  : 1990  MRN: [id number]  Today's Date: 2023               Admit Date: 10/26/2023    Visit Dx:     ICD-10-CM ICD-9-CM   1. Shortness of breath  R06.02 786.05   2. ESRD (end stage renal disease) on dialysis  N18.6 585.6    Z99.2 V45.11   3. Hyponatremia  E87.1 276.1   4. Generalized abdominal pain  R10.84 789.07   5. Elevated lactic acid level  R79.89 276.2   6. Elevated troponin  R79.89 790.6   7. Severe aortic valve regurgitation  I35.1 424.1   8. 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 syndrome    Systemic lupus erythematosus    Lupus nephritis, ISN/RPS class IV    Hypokalemia    Hypocalcemia    COVID-19    Hospital discharge follow-up    Stage 5 chronic kidney disease    Cardiac cirrhosis    Pancreatitis    Duodenitis    Regional enteritis of small bowel    Pericardial effusion    End stage renal disease on dialysis    Hemodialysis status    Seizure disorder    Elevated liver function tests    C. difficile colitis    Anemia, chronic disease    Essential hypertension    Peritoneal dialysis catheter in place    Anemia due to chronic kidney disease, on chronic dialysis    Alternating constipation and diarrhea    Abnormal stress test    Hyponatremia    Poor appetite    Moderate malnutrition    Chronic diastolic CHF (congestive heart failure)    Aortic valve lesion    Severe aortic valve regurgitation    Dissecting ascending aortic aneurysm     Past Medical History:   Diagnosis Date    Anasarca     PER CT SCAN    Dry skin      ESRD (end stage renal disease) on dialysis     MARCO COLE, KATIE CHAVIRA HWDRAKE    History of abdominal pain     History of anemia     History of transfusion     Hypertension     Iron deficiency anemia 09/27/2021    Lupus (systemic lupus erythematosus) 07/30/2022    Migraine     Other specified nutritional anemias     Pericardial effusion     Renal insufficiency     Seizures     STATES LAST WAS 1/2023    Shortness of breath     OCCASIONAL    Vitamin D deficiency 09/27/2021     Past Surgical History:   Procedure Laterality Date    ASCENDING AORTIC ANEURYSM REPAIR W/ MECHANICAL AORTIC VALVE REPLACEMENT N/A 11/2/2023    Procedure: CHETNA STERNOTOMY, AORTIC ROOT REPLACEMENT WITH VALVE SPARING MISHEL PROCEDURE, REPLACEMENT OF ASCENDING AORTA, RIGHT FEMORAL DIALYSIS CATHETER PLACEMENT AND PRP;  Surgeon: Chris Medina MD;  Location: [facility] CVOR;  Service: Cardiothoracic;  Laterality: N/A;    CARDIAC CATHETERIZATION N/A 06/14/2023    Procedure: Coronary angiography;  Surgeon: Juana Taylor MD;  Location: [facility] CATH INVASIVE LOCATION;  Service: Cardiovascular;  Laterality: N/A;    CARDIAC CATHETERIZATION N/A 06/14/2023    Procedure: Left heart cath;  Surgeon: Juana Taylor MD;  Location: [facility] CATH INVASIVE LOCATION;  Service: Cardiovascular;  Laterality: N/A;    CARDIAC CATHETERIZATION N/A 06/14/2023    Procedure: Right Heart Cath;  Surgeon: Juana Taylor MD;  Location: [facility] CATH INVASIVE LOCATION;  Service: Cardiovascular;  Laterality: N/A;    COLONOSCOPY N/A 7/20/2023    Procedure: COLONOSCOPY to cecum with biopsy;  Surgeon: Drew Kaminski MD;  Location: [facility] ENDOSCOPY;  Service: Gastroenterology;  Laterality: N/A;  PRE - diarrhea, constipation  POST - fair prep, normal    CORONARY ARTERY BYPASS GRAFT N/A 11/6/2023    Procedure: STERNAL EXPLORATION AND WASH OUT;  Surgeon: Jr Mitesh Quiroz MD;  Location: [facility] CVOR;  Service: Cardiothoracic;  Laterality: N/A;    ENDOSCOPY N/A 7/20/2023     Procedure: ESOPHAGOGASTRODUODENOSCOPY with biopsy;  Surgeon: Drew Kaminski MD;  Location: [facility] ENDOSCOPY;  Service: Gastroenterology;  Laterality: N/A;  PRE - abn ct abd  POST - gastritis    INSERTION HEMODIALYSIS CATHETER N/A 07/26/2022    Procedure: RIGHT TUNNELED DIALYSIS CATHETER PLACEMENT;  Surgeon: Diandra Adhikari MD;  Location: [facility] MAIN OR;  Service: Vascular;  Laterality: N/A;    INSERTION PERITONEAL DIALYSIS CATHETER N/A 04/03/2023    Procedure: INSERTION PERITONEAL DIALYSIS CATHETER LAPAROSCOPIC, omentumpexy;  Surgeon: Jemal Loyola MD;  Location: [facility] MAIN OR;  Service: General;  Laterality: N/A;    TONSILLECTOMY         SLP Recommendation and Plan  SLP Swallowing Diagnosis: swallow WFL/no suspected pharyngeal impairment (11/11/23 1300)  SLP Diet Recommendation: soft to chew textures, thin liquids (11/11/23 1300)  Recommended Precautions and Strategies: upright posture during/after eating, small bites of food and sips of liquid, general aspiration precautions, assist with feeding (11/11/23 1300)  SLP Rec. for Method of Medication Administration: meds whole, meds crushed, with thin liquids, with puree, as tolerated (11/11/23 1300)     Monitor for Signs of Aspiration: yes, notify SLP if any concerns (11/11/23 1300)  Recommended Diagnostics: reassess via clinical swallow evaluation (11/11/23 1300)  Swallow Criteria for Skilled Therapeutic Interventions Met: demonstrates skilled criteria (11/11/23 1300)     Rehab Potential/Prognosis, Swallowing: good, to achieve stated therapy goals (11/11/23 1300)  Therapy Frequency (Swallow): PRN (11/11/23 1300)  Predicted Duration Therapy Intervention (Days): until discharge (11/11/23 1300)  Oral Care Recommendations: Oral Care BID/PRN (11/11/23 1300)                                      Oral Care Recommendations: Oral Care BID/PRN (11/11/23 1300)           SWALLOW EVALUATION (last 72 hours)       SLP Adult Swallow Evaluation       Row Name 11/11/23  "1300                   Rehab Evaluation    Document Type evaluation  -CB        Subjective Information no complaints  -CB        Patient Observations alert;cooperative;agree to therapy  -CB        Patient Effort good  -CB           General Information    Patient Profile Reviewed yes  -CB        Pertinent History Of Current Problem 33-year-old female presents to the hospital with weakness and shortness of breath found to have severe hyponatremia.  Pt intubated x 8 days and extubated last date.  -CB        Current Method of Nutrition NPO;nasogastric feedings  -CB        Precautions/Limitations, Vision WFL;for purposes of eval  -CB        Precautions/Limitations, Hearing WFL;for purposes of eval  -CB        Prior Level of Function-Communication WFL  -CB        Prior Level of Function-Swallowing safe, efficient swallowing in all situations  -CB        Plans/Goals Discussed with patient;agreed upon  -CB        Barriers to Rehab medically complex  -CB        Patient's Goals for Discharge patient did not state  -CB           Clinical Swallow Eval    Clinical Swallow Evaluation Summary Pt presents with functional oropharyngeal swallow with appropriate rotary mastication, bolus formation and AP transit.  Initial coughing after presentations of ice chips, however, ice chips able to assist in removal of dried mucous.  Oral yonker utilized.  Pt accepted thin from cup and straw with no overt s/s aspiration or changes in vitals.  Pt hesitant to accept solids and only interested in \"soothing\" items.  After encouragement, patient did accept puree and strained mechanical soft with mastication and swallow WFL.  Regular not tested.  Pt appears appropriate for mechanical soft diet/thin liquids, meds whole or crushed w/puree, upright for PO, alternate liquids/solids.  RNAnna, at bedside during evaluation.  Education re: return to NPO if overt s/s aspiration noted.  ST to follow for diet tolerance.  -CB           SLP Evaluation " Clinical Impression    SLP Swallowing Diagnosis swallow WFL/no suspected pharyngeal impairment  -CB        Functional Impact no impact on function  -CB        Rehab Potential/Prognosis, Swallowing good, to achieve stated therapy goals  -CB        Swallow Criteria for Skilled Therapeutic Interventions Met demonstrates skilled criteria  -CB           Recommendations    Therapy Frequency (Swallow) PRN  -CB        Predicted Duration Therapy Intervention (Days) until discharge  -CB        SLP Diet Recommendation soft to chew textures;thin liquids  -CB        Recommended Diagnostics reassess via clinical swallow evaluation  -CB        Recommended Precautions and Strategies upright posture during/after eating;small bites of food and sips of liquid;general aspiration precautions;assist with feeding  -CB        Oral Care Recommendations Oral Care BID/PRN  -CB        SLP Rec. for Method of Medication Administration meds whole;meds crushed;with thin liquids;with puree;as tolerated  -CB        Monitor for Signs of Aspiration yes;notify SLP if any concerns  -CB           (LTG) Patient will demonstrate functional swallow for    Diet Texture (Demonstrate functional swallow) regular textures  -CB        Liquid viscosity (Demonstrate functional swallow) thin liquids  -CB        [place] (Demonstrate functional swallow) independently (over 90% accuracy)  -CB        Time Frame (Demonstrate functional swallow) by discharge  -CB        Progress/Outcomes (Demonstrate functional swallow) continuing progress toward goal  -CB                  User Key  (r) = Recorded By, (t) = Taken By, (c) = Cosigned By      Initials Name Effective Dates    CB Sophia Corado CCC-SLP 06/01/23 -                     EDUCATION  The patient has been educated in the following areas:   Dysphagia (Swallowing Impairment).        SLP GOALS       Row Name 11/11/23 1300             (LTG) Patient will demonstrate functional swallow for    Diet Texture (Demonstrate  functional swallow) regular textures  -CB      Liquid viscosity (Demonstrate functional swallow) thin liquids  -CB      [place] (Demonstrate functional swallow) independently (over 90% accuracy)  -CB      Time Frame (Demonstrate functional swallow) by discharge  -CB      Progress/Outcomes (Demonstrate functional swallow) continuing progress toward goal  -CB                User Key  (r) = Recorded By, (t) = Taken By, (c) = Cosigned By      Initials Name Provider Type    Sophia Velez CCC-SLP Speech and Language Pathologist                       Time Calculation:    Time Calculation- SLP       Row Name 11/11/23 1331             Time Calculation- SLP    SLP Start Time 1300  -CB      SLP Received On 11/11/23  -CB         Untimed Charges    [id number] Eval Oral Pharyng Swallow Minutes 45  -CB         Total Minutes    Untimed Charges Total Minutes 45  -CB       Total Minutes 45  -CB                User Key  (r) = Recorded By, (t) = Taken By, (c) = Cosigned By      Initials Name Provider Type    Sophia Velez CCC-SLP Speech and Language Pathologist                    Therapy Charges for Today       Code Description Service Date Service Provider Modifiers Qty    [id number]  ST EVAL ORAL PHARYNG SWALLOW 3 11/11/2023 Sophia Corado CCC-ANNELISE GN 1                 CARSON Oropeza  11/11/2023

## 2023-11-11 NOTE — PROGRESS NOTES
[facility] Acute Rehab    Referral received.  Chart work initiated.  Will follow for medical stability and progress with therapies to determine most appropriate level of rehab needed at time of discharge.    Thank you,  Regi Giordano, RN  Rehab Admission Nurse

## 2023-11-11 NOTE — PROGRESS NOTES
"  Daily Progress Note.   [facility]IO [facility]ERY  11/11/2023    Patient:  Name:  Dee Herrera  MRN:  [id number]  1990  33 y.o.  female         CC/reason for visit:status post aortic aneurysm/dissection repair with valve sparing procedure.  Postoperative respiratory insufficiency    Interval History:  Status postextubation tolerating well 3 L however saturation is 100%.  Afebrile.  Blood pressure stable  On hemodialysis at present time says she is cold.  No worsening soa.  Is appropriate no confusion. No chest pain just some discomfort but not uncontrolled pain.      Physical Exam:  /99   Pulse 98   Temp 98.5 °F (36.9 °C) (Oral)   Resp 20   Ht 165 cm (64.96\")   Wt 52.9 kg (116 lb 10 oz)   LMP 08/10/2022 (Approximate)   SpO2 100%   BMI 19.43 kg/m²   Body mass index is 19.43 kg/m².    Intake/Output Summary (Last 24 hours) at 11/11/2023 0957  Last data filed at 11/11/2023 0600  Gross per 24 hour   Intake 1068 ml   Output 0 ml   Net 1068 ml     General appearance: Nontoxic, conversant   Eyes: anicteric sclerae, moist conjunctivae; no lidlag;    HENT:  oropharynx clear    Lungs: margarito no rhonchi, with calm respiratory effort and no intercostal retractions  CV: mildline incision no rub rrr  Extremities: mild peripheral le edema  Skin: WWP no diffuse visible rash today  Psych: Appropriate affect, alert   Neuro: CN II-XII. Speech intact.    Data Review:  Notable Labs:  Results from last 7 days   Lab Units 11/11/23  0309 11/10/23  0311 11/09/23  0306 11/08/23  0258 11/07/23  0256 11/06/23  1754 11/06/23  1631   WBC 10*3/mm3 9.87 9.25 8.95 10.72 9.25 8.73 7.86   HEMOGLOBIN g/dL 8.5* 8.7* 8.8* 10.0* 8.9* 8.6* 4.9*   PLATELETS 10*3/mm3 86* 87* 92* 112* 88* 92* 123*     Results from last 7 days   Lab Units 11/11/23  0309 11/10/23  0311 11/09/23  0306 11/08/23  0258 11/07/23  0256 11/06/23  1754 11/06/23  1312   SODIUM mmol/L 136 135* 137 136 140 139 136  135*   POTASSIUM mmol/L 3.2* 3.4* 3.5 " 3.6 4.0 4.0 5.2  5.1   CHLORIDE mmol/L 104 104 105 103 106 104 103  103   CO2 mmol/L 21.5* 22.0 22.2 22.0 20.0* 20.1* 14.0*  15.0*   BUN mg/dL 42* 24* 29* 18 30* 24* 26*  26*   CREATININE mg/dL 4.76* 3.55* 4.58* 3.24* 4.75* 4.27* 4.72*  4.78*   GLUCOSE mg/dL 122* 117* 114* 124* 89 108* 66  67   CALCIUM mg/dL 8.4* 8.7 8.7 8.8 8.3* 7.9* 10.2  10.1   MAGNESIUM mg/dL 1.9  --  2.0 1.8 1.9  --   --    PHOSPHORUS mg/dL 1.4* 1.8* 2.8 3.1 5.2* 5.0* 5.6*   Estimated Creatinine Clearance: 14.3 mL/min (A) (by C-G formula based on SCr of 4.76 mg/dL (H)).    Results from last 7 days   Lab Units 11/11/23  0309 11/10/23  0311 11/09/23  0306 11/08/23  0258 11/07/23  0256   AST (SGOT) U/L  --   --  21 22 23   ALT (SGPT) U/L  --   --  <5 <5 <5   PROCALCITONIN ng/mL 5.05*  --   --   --   --    PLATELETS 10*3/mm3 86* 87* 92* 112* 88*       Results from last 7 days   Lab Units 11/10/23  0736 11/09/23  1443 11/08/23  1409 11/07/23  2259 11/06/23  1607 11/06/23  1328 11/06/23  0719 11/05/23  0034   PH, ARTERIAL pH units 7.461* 7.499* 7.463* 7.471* 7.4210 7.448 7.440 7.257*   PCO2, ARTERIAL mm Hg 30.6* 29.9* 30.9* 27.2*  --  20.2* 29.7* 48.1*   PO2 ART mm Hg 166.0* 158.3* 141.5* 153.9*  --  110.1* 147.5* 526.7*   HCO3 ART mmol/L 21.9* 23.3 22.1 19.9*  --  13.9* 20.1* 21.5*       Imaging:  Reviewed chest images personally from past 3 days    Chest x-ray vascular congestion bilateral effusions    ASSESSMENT  /  PLAN:  Bilateral pleural effusions  Respiratory sufficiency postoperative  Elevated procalcitonin  Ascending aortic aneurysm with dissection- s/p ascending aortic dissection repair/proximal replacement, gacron interposition graft, kelli procedure; insertion of right femoral shiley   Cardiac tamponade- reexploration for bleeding, removal of large clot compressing the RV- POD#4 [place]     Thrombocytopenia    Hypertension secondary to other renal disorders    Pancytopenia    Systemic lupus erythematosus - holding IS    End  stage renal disease on dialysis    Seizure disorder    Elevated liver function tests    Essential hypertension    Peritoneal dialysis catheter in place    Anemia due to chronic kidney disease, on chronic dialysis    Hyponatremia    Moderate malnutrition    Chronic diastolic CHF (congestive heart failure)    Aortic valve lesion    Severe aortic valve regurgitation     Chest x-ray does show some volume up.  Hemodialysis today should help with this.  Wean off oxygen as able.  Procalcitonin is elevated at 5.  This is in the setting of ESRD/dialysis.  Patient has no white count and is afebrile.  With any fever would suggest microbiology and antibiotics.  Discussed with bedside nurse.    All issues new to me today.  Prior hospital course, labs and imaging reviewed.  Complex medical patient.        Electronically signed by Govind Edmond MD, 11/11/23, 9:57 AM [facility].  [facility]

## 2023-11-11 NOTE — PROGRESS NOTES
" LOS: 16 days   Patient Care Team:  Margarita Woods APRN as PCP - General (Nurse Practitioner)  Winnie Sanchez MD as Referring Physician (Obstetrics and Gynecology)  Norberto Almaraz MD PhD as Consulting Physician (Hematology and Oncology)  Lupis Thomas MD as Consulting Physician (Nephrology)  Hair Mckeon MD as Consulting Physician (Nephrology)  Juana Taylor MD as Consulting Physician (Cardiology)    Chief Complaint: post op    Subjective      Vital Signs  Temp:  [97.9 °F (36.6 °C)-98.9 °F (37.2 °C)] 98.5 °F (36.9 °C)  Heart Rate:  [] 98  Resp:  [14-27] 20  BP: (112-133)/(79-99) 127/99  FiO2 (%):  [40 %] 40 %  Body mass index is 19.43 kg/m².    Intake/Output Summary (Last 24 hours) at 11/11/2023 0853  Last data filed at 11/11/2023 0600  Gross per 24 hour   Intake 1128 ml   Output 0 ml   Net 1128 ml     No intake/output data recorded.    Chest tube drainage last 8 hours       11/09/23  0539 11/10/23  0422 11/11/23  0600   Weight: 59.6 kg (131 lb 6.3 oz) 54.9 kg (121 lb 0.5 oz) 52.9 kg (116 lb 10 oz)         Objective:  Vital signs: (most recent): Blood pressure 127/99, pulse 98, temperature 98.5 °F (36.9 °C), temperature source Oral, resp. rate 20, height 165 cm (64.96\"), weight 52.9 kg (116 lb 10 oz), last menstrual period 08/10/2022, SpO2 100%, not currently breastfeeding.                Results Review:        WBC WBC   Date Value Ref Range Status   11/11/2023 9.87 3.40 - 10.80 10*3/mm3 Final   11/10/2023 9.25 3.40 - 10.80 10*3/mm3 Final   11/09/2023 8.95 3.40 - 10.80 10*3/mm3 Final      HGB Hemoglobin   Date Value Ref Range Status   11/11/2023 8.5 (L) 12.0 - 15.9 g/dL Final   11/10/2023 8.7 (L) 12.0 - 15.9 g/dL Final   11/09/2023 8.8 (L) 12.0 - 15.9 g/dL Final      HCT Hematocrit   Date Value Ref Range Status   11/11/2023 25.3 (L) 34.0 - 46.6 % Final   11/10/2023 26.3 (L) 34.0 - 46.6 % Final   11/09/2023 27.0 (L) 34.0 - 46.6 % Final      Platelets Platelets   Date Value Ref Range " "Status   11/11/2023 86 (L) 140 - 450 10*3/mm3 Final   11/10/2023 87 (L) 140 - 450 10*3/mm3 Final   11/09/2023 92 (L) 140 - 450 10*3/mm3 Final        PT/INR:    No results found for: \"PROTIME\"  /  No results found for: \"INR\"      Sodium Sodium   Date Value Ref Range Status   11/11/2023 136 136 - 145 mmol/L Final   11/10/2023 135 (L) 136 - 145 mmol/L Final   11/09/2023 137 136 - 145 mmol/L Final      Potassium Potassium   Date Value Ref Range Status   11/11/2023 3.2 (L) 3.5 - 5.2 mmol/L Final   11/10/2023 3.4 (L) 3.5 - 5.2 mmol/L Final   11/09/2023 3.5 3.5 - 5.2 mmol/L Final      Chloride Chloride   Date Value Ref Range Status   11/11/2023 104 98 - 107 mmol/L Final   11/10/2023 104 98 - 107 mmol/L Final   11/09/2023 105 98 - 107 mmol/L Final      Bicarbonate CO2   Date Value Ref Range Status   11/11/2023 21.5 (L) 22.0 - 29.0 mmol/L Final   11/10/2023 22.0 22.0 - 29.0 mmol/L Final   11/09/2023 22.2 22.0 - 29.0 mmol/L Final      BUN BUN   Date Value Ref Range Status   11/11/2023 42 (H) 6 - 20 mg/dL Final   11/10/2023 24 (H) 6 - 20 mg/dL Final   11/09/2023 29 (H) 6 - 20 mg/dL Final      Creatinine Creatinine   Date Value Ref Range Status   11/11/2023 4.76 (H) 0.57 - 1.00 mg/dL Final   11/10/2023 3.55 (H) 0.57 - 1.00 mg/dL Final   11/09/2023 4.58 (H) 0.57 - 1.00 mg/dL Final      Calcium Calcium   Date Value Ref Range Status   11/11/2023 8.4 (L) 8.6 - 10.5 mg/dL Final   11/10/2023 8.7 8.6 - 10.5 mg/dL Final   11/09/2023 8.7 8.6 - 10.5 mg/dL Final      Magnesium Magnesium   Date Value Ref Range Status   11/11/2023 1.9 1.6 - 2.6 mg/dL Final   11/09/2023 2.0 1.6 - 2.6 mg/dL Final          amLODIPine, 10 mg, Oral, Q24H  [Held by provider] aspirin, 81 mg, Oral, Daily  atorvastatin, 40 mg, Oral, Nightly  carvedilol, 25 mg, Oral, Q12H  chlorhexidine, 15 mL, Mouth/Throat, Q12H  First Mouthwash (Magic Mouthwash), 10 mL, Swish & Spit, Q6H  [Held by provider] heparin (porcine), 5,000 Units, Subcutaneous, Q8H  insulin regular, 2-7 " Units, Subcutaneous, Q6H  Lacosamide, 50 mg, Intravenous, Q12H  levETIRAcetam, 250 mg, Intravenous, Once  levETIRAcetam, 250 mg, Intravenous, Q12H  mupirocin, , Each Nare, BID  pantoprazole, 40 mg, Intravenous, Q AM  potassium phosphate, 15 mmol, Intravenous, Once  senna-docusate sodium, 2 tablet, Oral, Nightly  sodium chloride, 4 mL, Nebulization, BID - RT      niCARdipine, 5-15 mg/hr, Last Rate: Stopped (11/08/23 1130)  sodium chloride, 30 mL/hr, Last Rate: 30 mL/hr (11/02/23 1400)              Dissecting ascending aortic aneurysm    Vitamin D deficiency    Thrombocytopenia    Hypertension secondary to other renal disorders    Pancytopenia    Systemic lupus erythematosus    Pericardial effusion    End stage renal disease on dialysis    Seizure disorder    Elevated liver function tests    Essential hypertension    Peritoneal dialysis catheter in place    Anemia due to chronic kidney disease, on chronic dialysis    Hyponatremia    Poor appetite    Moderate malnutrition    Chronic diastolic CHF (congestive heart failure)    Aortic valve lesion    Severe aortic valve regurgitation      Assessment & Plan    -Ascending aortic aneurysm with dissection- s/p ascending aortic dissection repair/proximal replacement, gacron interposition graft, kelli procedure; insertion of right femoral shiley- POD#9 Adam  -cardiac tamponade- reexploration for bleeding, removal of large clot compressing the RV- POD#5 [place]  - severe aortic valve insufficiency  - ESRD on PD  - Lupus--on Cellcept/plaquenil; currently held  - hx of seizures  - chronic immunosuppression  - hypertension  - chronic anemia  - TCP--chronic     Extubated yesterday afternoon  4L NC--wean as able  Sinus rhythm -- rate in the 80s   Dialysis this morning-- she has a shiley in her groin which is hindering her mobility  Dr. Medina would like to consult vascular surgery to change her dialysis catheter  Tolerating tube feeds  Anticipate the need for rehab at  discharge will ask up start rehab to evaluate  Continue supportive care       CAESAR Cotto  11/11/23  08:53 EST

## 2023-11-12 LAB
ANION GAP SERPL CALCULATED.3IONS-SCNC: 13 MMOL/L (ref 5–15)
BUN SERPL-MCNC: 21 MG/DL (ref 6–20)
BUN/CREAT SERPL: 6.6 (ref 7–25)
CALCIUM SPEC-SCNC: 8.3 MG/DL (ref 8.6–10.5)
CHLORIDE SERPL-SCNC: 99 MMOL/L (ref 98–107)
CO2 SERPL-SCNC: 22 MMOL/L (ref 22–29)
CREAT SERPL-MCNC: 3.16 MG/DL (ref 0.57–1)
DEPRECATED RDW RBC AUTO: 46.6 FL (ref 37–54)
EGFRCR SERPLBLD CKD-EPI 2021: 19.5 ML/MIN/1.73
ERYTHROCYTE [DISTWIDTH] IN BLOOD BY AUTOMATED COUNT: 15.2 % (ref 12.3–15.4)
GLUCOSE BLDC GLUCOMTR-MCNC: 70 MG/DL (ref 70–130)
GLUCOSE BLDC GLUCOMTR-MCNC: 87 MG/DL (ref 70–130)
GLUCOSE BLDC GLUCOMTR-MCNC: 87 MG/DL (ref 70–130)
GLUCOSE SERPL-MCNC: 106 MG/DL (ref 65–99)
HCT VFR BLD AUTO: 23.9 % (ref 34–46.6)
HGB BLD-MCNC: 8 G/DL (ref 12–15.9)
MCH RBC QN AUTO: 28.3 PG (ref 26.6–33)
MCHC RBC AUTO-ENTMCNC: 33.5 G/DL (ref 31.5–35.7)
MCV RBC AUTO: 84.5 FL (ref 79–97)
PHOSPHATE SERPL-MCNC: 3.1 MG/DL (ref 2.5–4.5)
PLATELET # BLD AUTO: 93 10*3/MM3 (ref 140–450)
PMV BLD AUTO: 13.3 FL (ref 6–12)
POTASSIUM SERPL-SCNC: 3.6 MMOL/L (ref 3.5–5.2)
RBC # BLD AUTO: 2.83 10*6/MM3 (ref 3.77–5.28)
SODIUM SERPL-SCNC: 134 MMOL/L (ref 136–145)
WBC NRBC COR # BLD: 11.93 10*3/MM3 (ref 3.4–10.8)

## 2023-11-12 PROCEDURE — 94799 UNLISTED PULMONARY SVC/PX: CPT

## 2023-11-12 PROCEDURE — 25010000002 LEVETRIRACETAM PER 10 MG: Performed by: INTERNAL MEDICINE

## 2023-11-12 PROCEDURE — 85027 COMPLETE CBC AUTOMATED: CPT | Performed by: THORACIC SURGERY (CARDIOTHORACIC VASCULAR SURGERY)

## 2023-11-12 PROCEDURE — 97162 PT EVAL MOD COMPLEX 30 MIN: CPT

## 2023-11-12 PROCEDURE — 99223 1ST HOSP IP/OBS HIGH 75: CPT | Performed by: STUDENT IN AN ORGANIZED HEALTH CARE EDUCATION/TRAINING PROGRAM

## 2023-11-12 PROCEDURE — 25010000002 LACOSAMIDE 200 MG/20ML SOLUTION

## 2023-11-12 PROCEDURE — 97530 THERAPEUTIC ACTIVITIES: CPT

## 2023-11-12 PROCEDURE — C9254 INJECTION, LACOSAMIDE: HCPCS

## 2023-11-12 PROCEDURE — 99232 SBSQ HOSP IP/OBS MODERATE 35: CPT | Performed by: INTERNAL MEDICINE

## 2023-11-12 PROCEDURE — 82948 REAGENT STRIP/BLOOD GLUCOSE: CPT

## 2023-11-12 PROCEDURE — 80048 BASIC METABOLIC PNL TOTAL CA: CPT

## 2023-11-12 PROCEDURE — 87040 BLOOD CULTURE FOR BACTERIA: CPT | Performed by: STUDENT IN AN ORGANIZED HEALTH CARE EDUCATION/TRAINING PROGRAM

## 2023-11-12 PROCEDURE — 84100 ASSAY OF PHOSPHORUS: CPT | Performed by: INTERNAL MEDICINE

## 2023-11-12 RX ADMIN — AMLODIPINE BESYLATE 10 MG: 10 TABLET ORAL at 10:14

## 2023-11-12 RX ADMIN — MUPIROCIN 1 APPLICATION: 20 OINTMENT TOPICAL at 10:14

## 2023-11-12 RX ADMIN — PANTOPRAZOLE SODIUM 40 MG: 40 INJECTION, POWDER, FOR SOLUTION INTRAVENOUS at 05:52

## 2023-11-12 RX ADMIN — 0.12% CHLORHEXIDINE GLUCONATE 15 ML: 1.2 RINSE ORAL at 10:14

## 2023-11-12 RX ADMIN — LACOSAMIDE 50 MG: 10 INJECTION INTRAVENOUS at 12:12

## 2023-11-12 RX ADMIN — LEVETIRACETAM 250 MG: 500 INJECTION, SOLUTION INTRAVENOUS at 21:32

## 2023-11-12 RX ADMIN — LEVETIRACETAM 250 MG: 500 INJECTION, SOLUTION INTRAVENOUS at 10:15

## 2023-11-12 RX ADMIN — MUPIROCIN 1 APPLICATION: 20 OINTMENT TOPICAL at 21:32

## 2023-11-12 RX ADMIN — IPRATROPIUM BROMIDE AND ALBUTEROL SULFATE 3 ML: 2.5; .5 SOLUTION RESPIRATORY (INHALATION) at 20:34

## 2023-11-12 RX ADMIN — DIPHENHYDRAMINE HYDROCHLORIDE AND LIDOCAINE HYDROCHLORIDE AND ALUMINUM HYDROXIDE AND MAGNESIUM HYDRO 10 ML: KIT at 10:15

## 2023-11-12 RX ADMIN — CARVEDILOL 25 MG: 25 TABLET, FILM COATED ORAL at 10:15

## 2023-11-12 RX ADMIN — CARVEDILOL 25 MG: 25 TABLET, FILM COATED ORAL at 21:32

## 2023-11-12 RX ADMIN — Medication 4 ML: at 20:34

## 2023-11-12 NOTE — PROGRESS NOTES
[facility]    Following for medical stability and progress with therapies to determine most appropriate level of rehab needed at time of discharge.    Thank you,  Regi Giordano RN  Rehab Admission Nurse

## 2023-11-12 NOTE — PLAN OF CARE
Goal Outcome Evaluation:  Plan of Care Reviewed With: patient           Outcome Evaluation: Pt is a 32 yo F who is post-op ascending aortic dissection repair. Pt presents to PT with impaired functional mobility and gait secondary to generalized weakness, impaired balance, and decreased activity tolerance. Pt was on the vent for an extended period of time post-op. Pt may benefit from skilled PT to address strength, mobility, and gait.      Anticipated Discharge Disposition (PT): skilled nursing facility, inpatient rehabilitation facility

## 2023-11-12 NOTE — PROGRESS NOTES
[facility] Progress Note      Patient Name: Dee Herrera  : 1990  MRN: [id number]  Primary Care Physician:  Margarita Woods APRN  Date of admission: 10/26/2023    Subjective     Interval History:   Follow-up end-stage renal disease, patient was on peritoneal dialysis but switched to hemodialysis in the hospital    The patient got extubated on November 10, 2023.  She is more awake and responsive.  She has NG tube in place and getting tube feeding.  She is not requiring any pressors.  She tolerated hemodialysis yesterday.    She has no fever.  She has no pain.    Review of Systems:   As noted above    Objective     Vitals:   Temp:  [97.8 °F (36.6 °C)-99.3 °F (37.4 °C)] 99.3 °F (37.4 °C)  Heart Rate:  [] 97  Resp:  [16-24] 20  BP: ()/() 127/96  Flow (L/min):  [3] 3    Intake/Output Summary (Last 24 hours) at 2023 0829  Last data filed at 2023 2327  Gross per 24 hour   Intake 1340 ml   Output --   Net 1340 ml       Physical Exam:    General Appearance: More awake and responsive, arousable, chronically ill and frail  Skin: warm and dry  HEENT: Supple neck, has NG tube in place  Neck: No JVD  Lungs: Scattered rhonchi, unlabored breathing effort  Heart: RRR, faint rub  Abdomen: soft, no guarding nondistended, normoactive bowels and PD catheter in place with clean exit site  Extremities: no edema, cyanosis or clubbing, temporary dialysis catheter in the right femoral vein  Neuro: Following simple commands    Scheduled Meds:     amLODIPine, 10 mg, Oral, Q24H  [Held by provider] aspirin, 81 mg, Oral, Daily  atorvastatin, 40 mg, Oral, Nightly  carvedilol, 25 mg, Oral, Q12H  chlorhexidine, 15 mL, Mouth/Throat, Q12H  First Mouthwash (Magic Mouthwash), 10 mL, Swish & Spit, Q6H  [Held by provider] heparin (porcine), 5,000 Units, Subcutaneous, Q8H  insulin regular, 2-7 Units, Subcutaneous, Q6H  Lacosamide, 50 mg, Intravenous, Q12H  levETIRAcetam, 250 mg,  Intravenous, Once  levETIRAcetam, 250 mg, Intravenous, Q12H  mupirocin, , Each Nare, BID  pantoprazole, 40 mg, Intravenous, Q AM  sodium chloride, 4 mL, Nebulization, BID - RT      IV Meds:   sodium chloride, 30 mL/hr, Last Rate: 30 mL/hr (11/02/23 1400)      Results Reviewed:   I have personally reviewed the results from the time of this admission to 11/12/2023 08:29 EST     Results from last 7 days   Lab Units 11/12/23  0359 11/11/23  0309 11/10/23  0311 11/09/23 0306 11/08/23 0258 11/07/23  0256   SODIUM mmol/L 134* 136 135* 137 136 140   POTASSIUM mmol/L 3.6 3.2* 3.4* 3.5 3.6 4.0   CHLORIDE mmol/L 99 104 104 105 103 106   CO2 mmol/L 22.0 21.5* 22.0 22.2 22.0 20.0*   BUN mg/dL 21* 42* 24* 29* 18 30*   CREATININE mg/dL 3.16* 4.76* 3.55* 4.58* 3.24* 4.75*   CALCIUM mg/dL 8.3* 8.4* 8.7 8.7 8.8 8.3*   BILIRUBIN mg/dL  --   --   --  0.3 0.4 0.3   ALK PHOS U/L  --   --   --  67 64 50   ALT (SGPT) U/L  --   --   --  <5 <5 <5   AST (SGOT) U/L  --   --   --  21 22 23   GLUCOSE mg/dL 106* 122* 117* 114* 124* 89       Estimated Creatinine Clearance: 21.1 mL/min (A) (by C-G formula based on SCr of 3.16 mg/dL (H)).    Results from last 7 days   Lab Units 11/12/23 0359 11/11/23  1643 11/11/23  0309 11/10/23  0311 11/09/23  0306 11/08/23  0258   MAGNESIUM mg/dL  --   --  1.9  --  2.0 1.8   PHOSPHORUS mg/dL 3.1 0.6* 1.4*   < > 2.8 3.1    < > = values in this interval not displayed.             Results from last 7 days   Lab Units 11/12/23  0359 11/11/23  0309 11/10/23  0311 11/09/23  0306 11/08/23  0258   WBC 10*3/mm3 11.93* 9.87 9.25 8.95 10.72   HEMOGLOBIN g/dL 8.0* 8.5* 8.7* 8.8* 10.0*   PLATELETS 10*3/mm3 93* 86* 87* 92* 112*       Results from last 7 days   Lab Units 11/06/23  1754 11/06/23  1736 11/06/23  1631   INR  1.50* 1.6* 1.69*       Assessment / Plan     ASSESSMENT:  End-stage renal disease secondary to lupus nephritis who was on peritoneal dialysis, she was switched since her surgery to hemodialysis until she is  up and moving then we will switch her back to PD.  She had hemodialysis yesterday and tolerated well  Hyponatremia associated with excessive water intake, sodium is fluctuating but acceptable  Cardiomyopathy ejection fraction about 40%  Thrombocytopenia, platelet today 93,000  Anemia of chronic kidney disease hemoglobin today is 8.0  SLE  Mitral and aortic valve insufficiency with DeBakey type I dissection which is felt to be either subacute or chronic, she underwent repair.  She then developed cardiac tamponade and had reexploration done with removal of large clot compressing the right ventricle  Seizure disorder  Hypertension with ESRD, BP fluctuated yesterday but it is more stable today    PLAN:  Continue same dose of amlodipine for now.  If blood pressure drops again, the dose will need to be lowered.  Continue same dose of carvedilol  Hemodialysis will be done again tomorrow.  Later in the week, if she continues to be stable then she can be switched back to peritoneal dialysis  Monitor potassium and phosphorus and replete as needed  We will correct serum sodium with dialysis  Decision of blood transfusion is up to the primary team.  Check iron stores and ferritin  Surveillance lab    I reviewed the chart and other providers note, I reviewed imaging and lab data.  Copied text in this note has been reviewed and is accurate as of 11/12/23.     Thank you for involving us in the care of Dee Herrera.  Please feel free to call with any questions.    Danika Smith MD  11/12/23  08:29 [facility]    [facility]  [phone]    Please note that portions of this note were completed with a voice recognition program.

## 2023-11-12 NOTE — CONSULTS
Referring Provider: No ref. provider found  Reason for Consultation:     Elevated procalcitonin leukocytosis in the setting of ESRD     Chief Complaint   Patient presents with    Shortness of Breath         Subjective   History of present illness: Is a 33-year-old female with past medical history of lupus, ESRD on dialysis, status post aortic aneurysm with dissection repair status post with ascending aortic aneurysm complicated by subacute/chronic aortic dissection status postrepair with proximal aortic replacement on 11/2.  Course complicated by cardiac tamponade due to pericardial thrombus and compression of the right ventricle that required reexploration and clot removal on 11/6 and postoperative seizures.  ID consulted for elevated procalcitonin and leukocytosis in the setting of ESRD.    Patient is now extubated on 3 L nasal cannula with fluctuating WBC and a range of 8-12.  Today WBC of 11 with procalcitonin yesterday of 5 in the setting of end-stage renal disease.  Patient has been afebrile.  Chest x-ray performed yesterday with persistent vascular congestion and bibasilar atelectasis.    Patient resistant to questioning.  Does finally awake however one-word answers only.  Denies any acute complaints.  Denies any cough, shortness of breath, nausea, vomiting, diarrhea.  Nurse at bedside though reports she has been coughing and had some loose bowel movements today.    Past Medical History:   Diagnosis Date    Anasarca     PER CT SCAN    Dry skin     ESRD (end stage renal disease) on dialysis     TUES, THURS, KATIE FRASER    History of abdominal pain     History of anemia     History of transfusion     Hypertension     Iron deficiency anemia 09/27/2021    Lupus (systemic lupus erythematosus) 07/30/2022    Migraine     Other specified nutritional anemias     Pericardial effusion     Renal insufficiency     Seizures     STATES LAST WAS 1/2023    Shortness of breath     OCCASIONAL    Vitamin D deficiency  09/27/2021       Past Surgical History:   Procedure Laterality Date    ASCENDING AORTIC ANEURYSM REPAIR W/ MECHANICAL AORTIC VALVE REPLACEMENT N/A 11/2/2023    Procedure: CHETNA STERNOTOMY, AORTIC ROOT REPLACEMENT WITH VALVE SPARING MISHEL PROCEDURE, REPLACEMENT OF ASCENDING AORTA, RIGHT FEMORAL DIALYSIS CATHETER PLACEMENT AND PRP;  Surgeon: Chris Medina MD;  Location: [facility];  Service: Cardiothoracic;  Laterality: N/A;    CARDIAC CATHETERIZATION N/A 06/14/2023    Procedure: Coronary angiography;  Surgeon: Juana Taylor MD;  Location: [facility] CATH INVASIVE LOCATION;  Service: Cardiovascular;  Laterality: N/A;    CARDIAC CATHETERIZATION N/A 06/14/2023    Procedure: Left heart cath;  Surgeon: Juana Taylor MD;  Location: [facility] CATH INVASIVE LOCATION;  Service: Cardiovascular;  Laterality: N/A;    CARDIAC CATHETERIZATION N/A 06/14/2023    Procedure: Right Heart Cath;  Surgeon: Juana Taylor MD;  Location: [facility] CATH INVASIVE LOCATION;  Service: Cardiovascular;  Laterality: N/A;    COLONOSCOPY N/A 7/20/2023    Procedure: COLONOSCOPY to cecum with biopsy;  Surgeon: Drew Kaminski MD;  Location: [facility] ENDOSCOPY;  Service: Gastroenterology;  Laterality: N/A;  PRE - diarrhea, constipation  POST - fair prep, normal    CORONARY ARTERY BYPASS GRAFT N/A 11/6/2023    Procedure: STERNAL EXPLORATION AND WASH OUT;  Surgeon: Jr Mitesh Quiroz MD;  Location: [facility];  Service: Cardiothoracic;  Laterality: N/A;    ENDOSCOPY N/A 7/20/2023    Procedure: ESOPHAGOGASTRODUODENOSCOPY with biopsy;  Surgeon: Drew Kaminski MD;  Location: [facility] ENDOSCOPY;  Service: Gastroenterology;  Laterality: N/A;  PRE - abn ct abd  POST - gastritis    INSERTION HEMODIALYSIS CATHETER N/A 07/26/2022    Procedure: RIGHT TUNNELED DIALYSIS CATHETER PLACEMENT;  Surgeon: Diandra Adhikari MD;  Location: [facility] MAIN OR;  Service: Vascular;  Laterality: N/A;    INSERTION PERITONEAL DIALYSIS CATHETER N/A 04/03/2023    Procedure: INSERTION  PERITONEAL DIALYSIS CATHETER LAPAROSCOPIC, omentumpexy;  Surgeon: Jemal Loyola MD;  Location: [facility];  Service: General;  Laterality: N/A;    TONSILLECTOMY         family history includes Anemia in her brother and mother; Autoimmune disease in her mother; Cancer in her maternal grandmother; Diabetes in her maternal grandmother and sister; Hypertension in her maternal grandmother; Sickle cell anemia in her cousin.     reports that she has quit smoking. Her smoking use included cigars. She has been exposed to tobacco smoke. She has never used smokeless tobacco. She reports current alcohol use. She reports current drug use. Drug: Marijuana.     Allergies   Allergen Reactions    Minoxidil Other (See Comments) and Hives     Pericardial effusion .       Medication:  Antibiotics:  Anti-Infectives (From admission, onward)      Ordered     Dose/Rate Route Frequency Start Stop    11/06/23 1419  vancomycin (VANCOCIN) 1000 mg/200 mL dextrose 5% IVPB        Ordering Provider: Shea Monteiro APRN    1,000 mg  over 60 Minutes Intravenous On Call to O.R. 11/07/23 0600 11/06/23 1525    11/05/23 0804  ceFAZolin in dextrose (ANCEF) IVPB solution 2,000 mg        Ordering Provider: Stephen Castillo MD    2,000 mg  over 30 Minutes Intravenous Once 11/05/23 0900 11/05/23 1153    11/02/23 1411  ceFAZolin in dextrose (ANCEF) IVPB solution 2,000 mg        Ordering Provider: Delia Stern APRN    2,000 mg  over 30 Minutes Intravenous Every 24 Hours 11/02/23 1800 11/04/23 1841    10/31/23 1427  ceFAZolin in dextrose (ANCEF) IVPB solution 2,000 mg        Ordering Provider: Katherine Caal APRN    2,000 mg  over 30 Minutes Intravenous On Call to O.R. 11/02/23 0600 11/02/23 1144              Objective     Physical Exam:   Vital Signs   Temp:  [98.1 °F (36.7 °C)-99.3 °F (37.4 °C)] 99.3 °F (37.4 °C)  Heart Rate:  [] 97  Resp:  [16-20] 20  BP: ()/() 127/96    GENERAL: Arousable but  "resistant to exam and questioning  HEENT: Oropharynx is clear. Hearing is grossly normal.   EYES: PERRL. No conjunctival injection. No lid lag.   HEART: Regular rate and regular rhythm. No peripheral edema.   LUNGS: Normal work of breathing on nasal cannula  GI: Soft, nontender, nondistended.   SKIN: Warm and dry without cutaneous eruptions in exposed areas    Results Review:   I reviewed the patient's new clinical results.  I reviewed the patient's new imaging results and agree with the interpretation.  I reviewed the patient's other test results and agree with the interpretation    Lab Results   Component Value Date    WBC 11.93 (H) 11/12/2023    HGB 8.0 (L) 11/12/2023    HCT 23.9 (L) 11/12/2023    MCV 84.5 11/12/2023    PLT 93 (L) 11/12/2023       No results found for: \"VANCOPEAK\", \"VANCOTROUGH\", \"VANCORANDOM\"    Lab Results   Component Value Date    GLUCOSE 106 (H) 11/12/2023    BUN 21 (H) 11/12/2023    CREATININE 3.16 (H) 11/12/2023    EGFRIFAFRI 107 06/25/2021    BCR 6.6 (L) 11/12/2023    CO2 22.0 11/12/2023    CALCIUM 8.3 (L) 11/12/2023    PROTENTOTREF 5.4 (L) 12/09/2022    ALBUMIN 2.7 (L) 11/11/2023    LABIL2 1.3 03/20/2023    AST 21 11/09/2023    ALT <5 11/09/2023         Estimated Creatinine Clearance: 21.1 mL/min (A) (by C-G formula based on SCr of 3.16 mg/dL (H)).      Microbiology:  10/26 respiratory panel negative  10/27 blood cultures negative  10/31 COVID-negative    Radiology:  10/11 chest x-ray reviewed by me with suspected atelectasis and vascular congestion.    Assessment     #Leukocytosis  #Ascending aortic aneurysm with chronic aortic dissection status postrepair and proximal aortic replacement on 11/2  #Cardiac tamponade secondary to pericardial thrombus status post reexploration and clot removal 11/6  #Seizures  #Hypoxic respiratory failure  #ESRD secondary to lupus nephritis  #Systemic lupus erythematosus  #Thrombocytopenia    WBCs 11 today however has fluctuated in this range.  " Procalcitonin is unreliable in the setting of end-stage renal disease.  Patient has remained afebrile off antibiotics.  No indications at this time for antimicrobial therapy.  Continue aggressive pulmonary toilet.  If diarrhea were to continue then could consider C. difficile testing.  Recommend blood cultures for completeness and will follow these up peripherally.  ID will sign off please contact with any further concerns.

## 2023-11-12 NOTE — PROGRESS NOTES
" LOS: 17 days   Patient Care Team:  Margarita Woods APRN as PCP - General (Nurse Practitioner)  Winnie Sanchez MD as Referring Physician (Obstetrics and Gynecology)  Norberto Almaraz MD PhD as Consulting Physician (Hematology and Oncology)  Lupis Thomas MD as Consulting Physician (Nephrology)  Hair Mckeon MD as Consulting Physician (Nephrology)  Juana Taylor MD as Consulting Physician (Cardiology)    Chief Complaint: post op    Subjective      Vital Signs  Temp:  [97.8 °F (36.6 °C)-99.3 °F (37.4 °C)] 99.3 °F (37.4 °C)  Heart Rate:  [] 97  Resp:  [16-24] 20  BP: ()/() 127/96  Body mass index is 18.99 kg/m².    Intake/Output Summary (Last 24 hours) at 11/12/2023 0727  Last data filed at 11/11/2023 2327  Gross per 24 hour   Intake 1370 ml   Output --   Net 1370 ml     No intake/output data recorded.    Chest tube drainage last 8 hours       11/10/23  0422 11/11/23  0600 11/12/23  0401   Weight: 54.9 kg (121 lb 0.5 oz) 52.9 kg (116 lb 10 oz) 51.7 kg (113 lb 15.7 oz)         Objective:  Vital signs: (most recent): Blood pressure 127/96, pulse 97, temperature 99.3 °F (37.4 °C), temperature source Oral, resp. rate 20, height 165 cm (64.96\"), weight 51.7 kg (113 lb 15.7 oz), last menstrual period 08/10/2022, SpO2 100%, not currently breastfeeding.                Results Review:        WBC WBC   Date Value Ref Range Status   11/12/2023 11.93 (H) 3.40 - 10.80 10*3/mm3 Final   11/11/2023 9.87 3.40 - 10.80 10*3/mm3 Final   11/10/2023 9.25 3.40 - 10.80 10*3/mm3 Final      HGB Hemoglobin   Date Value Ref Range Status   11/12/2023 8.0 (L) 12.0 - 15.9 g/dL Final   11/11/2023 8.5 (L) 12.0 - 15.9 g/dL Final   11/10/2023 8.7 (L) 12.0 - 15.9 g/dL Final      HCT Hematocrit   Date Value Ref Range Status   11/12/2023 23.9 (L) 34.0 - 46.6 % Final   11/11/2023 25.3 (L) 34.0 - 46.6 % Final   11/10/2023 26.3 (L) 34.0 - 46.6 % Final      Platelets Platelets   Date Value Ref Range Status   11/12/2023 " "93 (L) 140 - 450 10*3/mm3 Final   11/11/2023 86 (L) 140 - 450 10*3/mm3 Final   11/10/2023 87 (L) 140 - 450 10*3/mm3 Final        PT/INR:    No results found for: \"PROTIME\"  /  No results found for: \"INR\"      Sodium Sodium   Date Value Ref Range Status   11/12/2023 134 (L) 136 - 145 mmol/L Final   11/11/2023 136 136 - 145 mmol/L Final   11/10/2023 135 (L) 136 - 145 mmol/L Final      Potassium Potassium   Date Value Ref Range Status   11/12/2023 3.6 3.5 - 5.2 mmol/L Final   11/11/2023 3.2 (L) 3.5 - 5.2 mmol/L Final   11/10/2023 3.4 (L) 3.5 - 5.2 mmol/L Final      Chloride Chloride   Date Value Ref Range Status   11/12/2023 99 98 - 107 mmol/L Final   11/11/2023 104 98 - 107 mmol/L Final   11/10/2023 104 98 - 107 mmol/L Final      Bicarbonate CO2   Date Value Ref Range Status   11/12/2023 22.0 22.0 - 29.0 mmol/L Final   11/11/2023 21.5 (L) 22.0 - 29.0 mmol/L Final   11/10/2023 22.0 22.0 - 29.0 mmol/L Final      BUN BUN   Date Value Ref Range Status   11/12/2023 21 (H) 6 - 20 mg/dL Final   11/11/2023 42 (H) 6 - 20 mg/dL Final   11/10/2023 24 (H) 6 - 20 mg/dL Final      Creatinine Creatinine   Date Value Ref Range Status   11/12/2023 3.16 (H) 0.57 - 1.00 mg/dL Final   11/11/2023 4.76 (H) 0.57 - 1.00 mg/dL Final   11/10/2023 3.55 (H) 0.57 - 1.00 mg/dL Final      Calcium Calcium   Date Value Ref Range Status   11/12/2023 8.3 (L) 8.6 - 10.5 mg/dL Final   11/11/2023 8.4 (L) 8.6 - 10.5 mg/dL Final   11/10/2023 8.7 8.6 - 10.5 mg/dL Final      Magnesium Magnesium   Date Value Ref Range Status   11/11/2023 1.9 1.6 - 2.6 mg/dL Final          amLODIPine, 10 mg, Oral, Q24H  [Held by provider] aspirin, 81 mg, Oral, Daily  atorvastatin, 40 mg, Oral, Nightly  carvedilol, 25 mg, Oral, Q12H  chlorhexidine, 15 mL, Mouth/Throat, Q12H  First Mouthwash (Magic Mouthwash), 10 mL, Swish & Spit, Q6H  [Held by provider] heparin (porcine), 5,000 Units, Subcutaneous, Q8H  insulin regular, 2-7 Units, Subcutaneous, Q6H  Lacosamide, 50 mg, " Intravenous, Q12H  levETIRAcetam, 250 mg, Intravenous, Once  levETIRAcetam, 250 mg, Intravenous, Q12H  mupirocin, , Each Nare, BID  pantoprazole, 40 mg, Intravenous, Q AM  senna-docusate sodium, 2 tablet, Oral, Nightly  sodium chloride, 4 mL, Nebulization, BID - RT      niCARdipine, 5-15 mg/hr, Last Rate: Stopped (11/08/23 1130)  sodium chloride, 30 mL/hr, Last Rate: 30 mL/hr (11/02/23 1400)              Dissecting ascending aortic aneurysm    Vitamin D deficiency    Thrombocytopenia    Hypertension secondary to other renal disorders    Pancytopenia    Systemic lupus erythematosus    Pericardial effusion    End stage renal disease on dialysis    Seizure disorder    Elevated liver function tests    Essential hypertension    Peritoneal dialysis catheter in place    Anemia due to chronic kidney disease, on chronic dialysis    Hyponatremia    Poor appetite    Moderate malnutrition    Chronic diastolic CHF (congestive heart failure)    Aortic valve lesion    Severe aortic valve regurgitation      Assessment & Plan    -Ascending aortic aneurysm with dissection- s/p ascending aortic dissection repair/proximal replacement, gacron interposition graft, kelli procedure; insertion of right femoral shiley- POD#10 Pagni  -cardiac tamponade- reexploration for bleeding, removal of large clot compressing the RV- POD#6 Camporrotondo  - severe aortic valve insufficiency  - ESRD on PD  - Lupus--on Cellcept/plaquenil; currently held  - hx of seizures  - chronic immunosuppression  - hypertension  - chronic anemia  - TCP--chronic     Looks good this morning  3L NC--wean as able  WBC increased to 11 this morning--will check an CXR-- TMAX 99.3  Will discuss about discontinue her central line if she has good PIV access  Hgb is 8 this morning-- will order a type and screen for the morning and probably will need a unit with dialysis tomorrow.  Would like to get that groin line out as she is having several messy bowel movement-- total of 12  last night per bedside RN-- will stop her bowel regimen  Per renal may be able to resume her PD in the next few days   Sinus rhythm -- rate in the 80s   She really wants to move to the stepdown unit so her family can stay with her-she is off all pressors and tolerating extubation-- she has gotten up to the chair-- I think she is appropriate for transfer-- will discuss this with Dr. Medina.   Weak-- continue PT and OT--Anticipate rehab at discharge  Continue supportive care       CAESAR Cotto  11/12/23  07:27 EST

## 2023-11-12 NOTE — PROGRESS NOTES
"  Daily Progress Note.   [facility] CARDIO RECOVERY  11/12/2023    Patient:  Name:  Dee Herrera  MRN:  [id number]  1990  33 y.o.  female         CC/reason for visit:status post aortic aneurysm/dissection repair with valve sparing procedure.  Postoperative respiratory insufficiency    Interval History:  Patient is on 3 L saturation is 100%.  Blood pressure 130s over 90s she is asleep she will awaken with stimulation says she is fine denies any shortness of breath or worsening chest pain      Physical Exam:  /96   Pulse 97   Temp 99.3 °F (37.4 °C) (Oral)   Resp 20   Ht 165 cm (64.96\")   Wt 51.7 kg (113 lb 15.7 oz)   LMP 08/10/2022 (Approximate)   SpO2 100%   BMI 18.99 kg/m²   Body mass index is 18.99 kg/m².    Intake/Output Summary (Last 24 hours) at 11/12/2023 0947  Last data filed at 11/11/2023 2327  Gross per 24 hour   Intake 1340 ml   Output --   Net 1340 ml     General appearance: Nontoxic, conversant   Eyes: anicteric sclerae, moist conjunctiva  HENT:  oropharynx clear positive core track  Lungs: margarito wheeze or crackles, with unlabored respiratory effort and no intercostal retractions  No significant lower extremity edema pulses intact  Skin: WWP no diffuse visible rash today  Psych: Asleep however will awaken, alert   Neuro: CN II-XII. Speech intact.  Response to questions appropriately    Data Review:  Notable Labs:  Results from last 7 days   Lab Units 11/12/23  0359 11/11/23  0309 11/10/23  0311 11/09/23  0306 11/08/23  0258 11/07/23  0256 11/06/23  1754   WBC 10*3/mm3 11.93* 9.87 9.25 8.95 10.72 9.25 8.73   HEMOGLOBIN g/dL 8.0* 8.5* 8.7* 8.8* 10.0* 8.9* 8.6*   PLATELETS 10*3/mm3 93* 86* 87* 92* 112* 88* 92*     Results from last 7 days   Lab Units 11/12/23  0359 11/11/23  1643 11/11/23  0309 11/10/23  0311 11/09/23  0306 11/08/23  0258 11/07/23  0256 11/06/23  1754   SODIUM mmol/L 134*  --  136 135* 137 136 140 139   POTASSIUM mmol/L 3.6  --  3.2* 3.4* 3.5 3.6 4.0 4.0 "   CHLORIDE mmol/L 99  --  104 104 105 103 106 104   CO2 mmol/L 22.0  --  21.5* 22.0 22.2 22.0 20.0* 20.1*   BUN mg/dL 21*  --  42* 24* 29* 18 30* 24*   CREATININE mg/dL 3.16*  --  4.76* 3.55* 4.58* 3.24* 4.75* 4.27*   GLUCOSE mg/dL 106*  --  122* 117* 114* 124* 89 108*   CALCIUM mg/dL 8.3*  --  8.4* 8.7 8.7 8.8 8.3* 7.9*   MAGNESIUM mg/dL  --   --  1.9  --  2.0 1.8 1.9  --    PHOSPHORUS mg/dL 3.1 0.6* 1.4* 1.8* 2.8 3.1 5.2* 5.0*   Estimated Creatinine Clearance: 21.1 mL/min (A) (by C-G formula based on SCr of 3.16 mg/dL (H)).    Results from last 7 days   Lab Units 11/12/23  0359 11/11/23  0309 11/10/23  0311 11/09/23  0306 11/08/23  0258 11/07/23 0256   AST (SGOT) U/L  --   --   --  21 22 23   ALT (SGPT) U/L  --   --   --  <5 <5 <5   PROCALCITONIN ng/mL  --  5.05*  --   --   --   --    PLATELETS 10*3/mm3 93* 86* 87* 92* 112* 88*       Results from last 7 days   Lab Units 11/10/23  0736 11/09/23  1443 11/08/23  1409 11/07/23  2259 11/06/23  1607 11/06/23  1328 11/06/23  0719   PH, ARTERIAL pH units 7.461* 7.499* 7.463* 7.471* 7.4210 7.448 7.440   PCO2, ARTERIAL mm Hg 30.6* 29.9* 30.9* 27.2*  --  20.2* 29.7*   PO2 ART mm Hg 166.0* 158.3* 141.5* 153.9*  --  110.1* 147.5*   HCO3 ART mmol/L 21.9* 23.3 22.1 19.9*  --  13.9* 20.1*       Imaging:  Reviewed chest images personally from past 3 days    Chest x-ray vascular congestion bilateral effusions    ASSESSMENT  /  PLAN:  Bilateral pleural effusions  Respiratory sufficiency postoperative  Elevated procalcitonin  Ascending aortic aneurysm with dissection- s/p ascending aortic dissection repair/proximal replacement, gacron interposition graft, kelli procedure; insertion of right femoral shiley   Cardiac tamponade- reexploration for bleeding, removal of large clot compressing the RV- POD#4 Camporrotondo     Thrombocytopenia    Hypertension secondary to other renal disorders    Pancytopenia    Systemic lupus erythematosus - holding IS    End stage renal disease on  dialysis    Seizure disorder    Elevated liver function tests    Essential hypertension    Peritoneal dialysis catheter in place    Anemia due to chronic kidney disease, on chronic dialysis    Hyponatremia    Moderate malnutrition    Chronic diastolic CHF (congestive heart failure)    Aortic valve lesion    Severe aortic valve regurgitation     From pulmonary perspective:  Continue hemodialysis per nephrology monitor for  Wean off oxygen as able.  Monitor for any fever White count is up today.  With elevated procalcitonin we will ask infectious disease to evaluate  Discussed with bedside nurse.  Complex medical patient.  Hemodynamically stable on 3 L nasal cannula.  Out of the open-heart recovery when acceptable with CTS team  Discussed with bedside nurse      Electronically signed by Govind Edmond MD, 11/11/23, 9:57 AM [facility].  [facility]

## 2023-11-12 NOTE — PROGRESS NOTES
"CC: Thoracic aortic aneurysm    Interval History: No new acute events overnight      Vital Signs  Temp:  [97.8 °F (36.6 °C)-99.3 °F (37.4 °C)] 99.3 °F (37.4 °C)  Heart Rate:  [] 97  Resp:  [16-24] 20  BP: ()/() 127/96    Intake/Output Summary (Last 24 hours) at 11/12/2023 0913  Last data filed at 11/11/2023 2327  Gross per 24 hour   Intake 1340 ml   Output --   Net 1340 ml     Flowsheet Rows      Flowsheet Row First Filed Value   Admission Height 165.1 cm (65\") Documented at 10/26/2023 0012   Admission Weight 52.2 kg (115 lb) Documented at 10/26/2023 0012            PHYSICAL EXAM:  General: No acute distress, NG tube in place  Resp:NL Rate, symmetric chest expansion,unlabored, clear  CV:NL rate and rhythm, NL PMI, NL S1 and S2, no Murmur, no gallop, no rub, No JVD.   ABD:Nl sounds, no masses or tenderness, nondistended, no guarding or rebound  Neuro: alert,cooperative and oriented  Extr:Normal pedal pulses, No edema or cyanosis, moves all extremities      Results Review:    Results from last 7 days   Lab Units 11/12/23  0359   SODIUM mmol/L 134*   POTASSIUM mmol/L 3.6   CHLORIDE mmol/L 99   CO2 mmol/L 22.0   BUN mg/dL 21*   CREATININE mg/dL 3.16*   GLUCOSE mg/dL 106*   CALCIUM mg/dL 8.3*         Results from last 7 days   Lab Units 11/12/23  0359   WBC 10*3/mm3 11.93*   HEMOGLOBIN g/dL 8.0*   HEMATOCRIT % 23.9*   PLATELETS 10*3/mm3 93*     Results from last 7 days   Lab Units 11/06/23  1754 11/06/23  1736 11/06/23  1631   INR  1.50* 1.6* 1.69*   APTT seconds 34.3  --  35.1         Results from last 7 days   Lab Units 11/11/23  0309   MAGNESIUM mg/dL 1.9     Results from last 7 days   Lab Units 11/06/23  1312   TRIGLYCERIDES mg/dL 202*     I reviewed the patient's new clinical results.  I personally viewed and interpreted the patient's EKG/Telemetry data        Medication Review:   Meds reviewed    sodium chloride, 30 mL/hr, Last Rate: 30 mL/hr (11/02/23 " 1400)        Assessment/Plan    Assessment/Plan:      Ascending aortic aneurysm with subacute/chronic aortic root dissection.  Status post repair and proximal aortic replacement on 11/2 .  Severe aortic valve regurgitation.  Due to #3.  Now status post repair on 11/2.  Cardiac tamponade.  Due to pericardial thrombus anteriorly and compressing right ventricle.  Underwent reexploration with clot removal and treatment of a small amount of bleeding at the suture line on 11/6.  Cardiogenic shock.  Resolved  Seizures.  Started postoperatively.  Currently appears to be doing from this respect on medical therapy.    Postoperative respiratory failure.  Initially remained intubated because of frequent recurrent seizures.  Now unable to extubate due to poor parameters with spontaneous breathing trials including low NIF and VC.  Pulmonary is following.  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  On peritoneal dialysis normally.  Has been on hemodialysis postoperatively.    Hypertension.  Improved with maximum dose amlodipine and carvedilol.  Systemic lupus erythematosus.   Thrombocytopenia/anemia    No new acute events overnight.  Patient feeling better.  BP slightly on the higher side but has not taken morning made it.  Discussed patient with her nurse    Sukumar Freitas MD  11/12/23  09:13 EST

## 2023-11-12 NOTE — THERAPY EVALUATION
Patient Name: Dee Herrera  : 1990    MRN: [id number]                              Today's Date: 2023       Admit Date: 10/26/2023    Visit Dx:     ICD-10-CM ICD-9-CM   1. Shortness of breath  R06.02 786.05   2. ESRD (end stage renal disease) on dialysis  N18.6 585.6    Z99.2 V45.11   3. Hyponatremia  E87.1 276.1   4. Generalized abdominal pain  R10.84 789.07   5. Elevated lactic acid level  R79.89 276.2   6. Elevated troponin  R79.89 790.6   7. Severe aortic valve regurgitation  I35.1 424.1   8. 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 syndrome    Systemic lupus erythematosus    Lupus nephritis, ISN/RPS class IV    Hypokalemia    Hypocalcemia    COVID-19    Hospital discharge follow-up    Stage 5 chronic kidney disease    Cardiac cirrhosis    Pancreatitis    Duodenitis    Regional enteritis of small bowel    Pericardial effusion    End stage renal disease on dialysis    Hemodialysis status    Seizure disorder    Elevated liver function tests    C. difficile colitis    Anemia, chronic disease    Essential hypertension    Peritoneal dialysis catheter in place    Anemia due to chronic kidney disease, on chronic dialysis    Alternating constipation and diarrhea    Abnormal stress test    Hyponatremia    Poor appetite    Moderate malnutrition    Chronic diastolic CHF (congestive heart failure)    Aortic valve lesion    Severe aortic valve regurgitation    Dissecting ascending aortic aneurysm     Past Medical History:   Diagnosis Date    Anasarca     PER CT SCAN    Dry skin     ESRD (end stage renal disease) on dialysis     TUES, THURS,  SAT UMAIR FRASER    History of abdominal pain     History of anemia     History of transfusion     Hypertension     Iron deficiency anemia 09/27/2021    Lupus (systemic lupus erythematosus) 07/30/2022    Migraine     Other specified nutritional anemias     Pericardial effusion     Renal insufficiency     Seizures     STATES LAST WAS 1/2023    Shortness of breath     OCCASIONAL    Vitamin D deficiency 09/27/2021     Past Surgical History:   Procedure Laterality Date    ASCENDING AORTIC ANEURYSM REPAIR W/ MECHANICAL AORTIC VALVE REPLACEMENT N/A 11/2/2023    Procedure: CHETNA STERNOTOMY, AORTIC ROOT REPLACEMENT WITH VALVE SPARING MISHEL PROCEDURE, REPLACEMENT OF ASCENDING AORTA, RIGHT FEMORAL DIALYSIS CATHETER PLACEMENT AND PRP;  Surgeon: Chris Medina MD;  Location: [facility] CVOR;  Service: Cardiothoracic;  Laterality: N/A;    CARDIAC CATHETERIZATION N/A 06/14/2023    Procedure: Coronary angiography;  Surgeon: Juana Taylor MD;  Location: [facility] CATH INVASIVE LOCATION;  Service: Cardiovascular;  Laterality: N/A;    CARDIAC CATHETERIZATION N/A 06/14/2023    Procedure: Left heart cath;  Surgeon: Juana Taylor MD;  Location: [facility] CATH INVASIVE LOCATION;  Service: Cardiovascular;  Laterality: N/A;    CARDIAC CATHETERIZATION N/A 06/14/2023    Procedure: Right Heart Cath;  Surgeon: Juana Taylor MD;  Location: [facility] CATH INVASIVE LOCATION;  Service: Cardiovascular;  Laterality: N/A;    COLONOSCOPY N/A 7/20/2023    Procedure: COLONOSCOPY to cecum with biopsy;  Surgeon: Drew Kaminski MD;  Location: [facility] ENDOSCOPY;  Service: Gastroenterology;  Laterality: N/A;  PRE - diarrhea, constipation  POST - fair prep, normal    CORONARY ARTERY BYPASS GRAFT N/A 11/6/2023    Procedure: STERNAL EXPLORATION AND WASH OUT;  Surgeon: Jr Mitesh Quiroz MD;  Location: [facility] CVOR;  Service: Cardiothoracic;  Laterality: N/A;    ENDOSCOPY N/A 7/20/2023    Procedure: ESOPHAGOGASTRODUODENOSCOPY with biopsy;  Surgeon: Gordy  MD Drew;  Location: [facility] ENDOSCOPY;  Service: Gastroenterology;  Laterality: N/A;  PRE - abn ct abd  POST - gastritis    INSERTION HEMODIALYSIS CATHETER N/A 07/26/2022    Procedure: RIGHT TUNNELED DIALYSIS CATHETER PLACEMENT;  Surgeon: Diandra Adhikari MD;  Location: [facility] MAIN OR;  Service: Vascular;  Laterality: N/A;    INSERTION PERITONEAL DIALYSIS CATHETER N/A 04/03/2023    Procedure: INSERTION PERITONEAL DIALYSIS CATHETER LAPAROSCOPIC, omentumpexy;  Surgeon: Jemal Loyola MD;  Location: [facility] OR;  Service: General;  Laterality: N/A;    TONSILLECTOMY        General Information       Row Name 11/12/23 1138          Physical Therapy Time and Intention    Document Type evaluation  -     Mode of Treatment individual therapy;physical therapy  -       Row Name 11/12/23 1138          General Information    Prior Level of Function independent:;gait;transfer;bed mobility  -     Existing Precautions/Restrictions fall;sternal;cardiac  -       Row Name 11/12/23 1138          Living Environment    People in Home parent(s)  -       Row Name 11/12/23 1138          Cognition    Orientation Status (Cognition) oriented x 3  slow to respond, voice is soft  -       Row Name 11/12/23 1138          Safety Issues, Functional Mobility    Safety Issues Affecting Function (Mobility) safety precaution awareness;safety precautions follow-through/compliance;sequencing abilities  -     Impairments Affecting Function (Mobility) balance;coordination;endurance/activity tolerance;motor control;strength;shortness of breath;postural/trunk control;pain  -               User Key  (r) = Recorded By, (t) = Taken By, (c) = Cosigned By      Initials Name Provider Type     Kim Almendarez, PT Physical Therapist                   Mobility       Row Name 11/12/23 1139          Bed Mobility    Bed Mobility supine-sit;sit-supine  -     Supine-Sit Drew (Bed Mobility) not tested  sitting in chair  -      Sit-Supine [place] (Bed Mobility) verbal cues;nonverbal cues (demo/gesture);moderate assist (50% patient effort);2 person assist  -     Assistive Device (Bed Mobility) bed rails;head of bed elevated  -[facility] Name 11/12/23 1139          Sit-Stand Transfer    Sit-Stand [place] (Transfers) verbal cues;nonverbal cues (demo/gesture);moderate assist (50% patient effort);2 person assist  -     Assistive Device (Sit-Stand Transfers) --  A  -     Comment, (Sit-Stand Transfer) pt performed sit to stand x2, cues to straighten knees and stand tall  -       Row Name 11/12/23 1139          Gait/Stairs (Locomotion)    [place] Level (Gait) verbal cues;nonverbal cues (demo/gesture);moderate assist (50% patient effort);2 person assist  -     Assistive Device (Gait) --  HHA  -     Distance in Feet (Gait) 4  -     Deviations/Abnormal Patterns (Gait) amrita decreased;festinating/shuffling;gait speed decreased;stride length decreased  -     Bilateral Gait Deviations forward flexed posture  -     Comment, (Gait/Stairs) cues to stand tall  -               User Key  (r) = Recorded By, (t) = Taken By, (c) = Cosigned By      Initials Name Provider Type     Kim Almendarez, PT Physical Therapist                   Obj/Interventions       [facility] Name 11/12/23 1143          Range of Motion Comprehensive    General Range of Motion no range of motion deficits identified  -CH       Row Name 11/12/23 1143          Strength Comprehensive (MMT)    Comment, General Manual Muscle Testing (MMT) Assessment generalized weakness noted with functional mobility, pt able to lift B LE atleast partially against gravity  -[facility] Name 11/12/23 1143          Motor Skills    Therapeutic Exercise --  10 reps B LE AP and LAQ  -[facility] Name 11/12/23 1143          Balance    Balance Assessment standing static balance;standing dynamic balance  -     Static Standing Balance verbal cues;non-verbal cues  (demo/gesture);moderate assist;2-person assist  -     Dynamic Standing Balance verbal cues;non-verbal cues (demo/gesture);moderate assist;2-person assist  -     Position/Device Used, Standing Balance --  HHa  -               User Key  (r) = Recorded By, (t) = Taken By, (c) = Cosigned By      Initials Name Provider Type     Kim Almendarez, PT Physical Therapist                   Goals/Plan       Row Name 11/12/23 1147          Problem Specific Goal 1 (PT)    Problem Specific Goal 1 (PT) cardiac level 3  -     Time Frame (Problem Specific Goal 1, PT) 1 week  -       Row Name 11/12/23 1149          Therapy Assessment/Plan (PT)    Planned Therapy Interventions (PT) balance training;bed mobility training;gait training;home exercise program;patient/family education;strengthening;transfer training  -               User Key  (r) = Recorded By, (t) = Taken By, (c) = Cosigned By      Initials Name Provider Type     Kim Almendarez, PT Physical Therapist                   Clinical Impression       Row Name 11/12/23 1141          Pain    Pre/Posttreatment Pain Comment pt did not report pain during PT session  -     Pain Intervention(s) Repositioned  -       Row Name 11/12/23 1147          Plan of Care Review    Plan of Care Reviewed With patient  -     Outcome Evaluation Pt is a 34 yo F who is post-op ascending aortic dissection repair. Pt presents to PT with impaired functional mobility and gait secondary to generalized weakness, impaired balance, and decreased activity tolerance. Pt was on the vent for an extended period of time post-op. Pt may benefit from skilled PT to address strength, mobility, and gait.  -       Row Name 11/12/23 1149          Therapy Assessment/Plan (PT)    Patient/Family Therapy Goals Statement (PT) to return to PLOF  -     Rehab Potential (PT) good, to achieve stated therapy goals  -     Criteria for Skilled Interventions Met (PT) skilled treatment is necessary  [facility]      Therapy Frequency (PT) 6 times/wk  -       Row Name 11/12/23 1144          Positioning and Restraints    Pre-Treatment Position sitting in chair/recliner  -     Post Treatment Position bed  -     In Bed supine;call light within reach;encouraged to call for assist;exit alarm on  -               User Key  (r) = Recorded By, (t) = Taken By, (c) = Cosigned By      Initials Name Provider Type     Kim Almendarez PT Physical Therapist                   Outcome Measures       Row Name 11/12/23 1147          How much help from another person do you currently need...    Turning from your back to your side while in flat bed without using bedrails? 2  -CH     Moving from lying on back to sitting on the side of a flat bed without bedrails? 2  -CH     Moving to and from a bed to a chair (including a wheelchair)? 2  -CH     Standing up from a chair using your arms (e.g., wheelchair, bedside chair)? 2  -CH     Climbing 3-5 steps with a railing? 1  -CH     To walk in hospital room? 1  -     AM-PAC 6 Clicks Score (PT) 10  -     Highest level of mobility 4 --> Transferred to chair/commode  -       Row Name 11/12/23 1147          Modified [place] Scale    Modified [place] Scale 4 - Moderately severe disability.  Unable to walk without assistance, and unable to attend to own bodily needs without assistance.  -       Row Name 11/12/23 1147          Functional Assessment    Outcome Measure Options AM-PAC 6 Clicks Basic Mobility (PT)  -               User Key  (r) = Recorded By, (t) = Taken By, (c) = Cosigned By      Initials Name Provider Type     Kim Almendarez, PT Physical Therapist                                 Physical Therapy Education       Title: PT OT SLP Therapies (In Progress)       Topic: Physical Therapy (Done)       Point: Mobility training (Done)       Learning Progress Summary             Patient Acceptance, E,TB,D, VU,NR by  at 11/12/2023 1148                         Point: Home exercise  program (Done)       Learning Progress Summary             Patient Acceptance, E,TB,D, VU,NR by  at 11/12/2023 1148                         Point: Body mechanics (Done)       Learning Progress Summary             Patient Acceptance, E,TB,D, VU,NR by  at 11/12/2023 1148                         Point: Precautions (Done)       Learning Progress Summary             Patient Acceptance, E,TB,D, VU,NR by  at 11/12/2023 1148                                         User Key       Initials Effective Dates Name Provider Type [facility] 06/16/21 -  Kim Almendarez PT Physical Therapist PT                  PT Recommendation and Plan  Planned Therapy Interventions (PT): balance training, bed mobility training, gait training, home exercise program, patient/family education, strengthening, transfer training  Plan of Care Reviewed With: patient  Outcome Evaluation: Pt is a 34 yo F who is post-op ascending aortic dissection repair. Pt presents to PT with impaired functional mobility and gait secondary to generalized weakness, impaired balance, and decreased activity tolerance. Pt was on the vent for an extended period of time post-op. Pt may benefit from skilled PT to address strength, mobility, and gait.     Time Calculation:         PT Charges       Row Name 11/12/23 1148             Time Calculation    Start Time 0945  -      Stop Time 0957  -      Time Calculation (min) 12 min  -CH      PT Received On 11/12/23  -      PT Goal Re-Cert Due Date 11/19/23  -         Time Calculation- PT    Total Timed Code Minutes- PT 8 minute(s)  -         Timed Charges    17202 - PT Therapeutic Activity Minutes 8  -CH         Total Minutes    Timed Charges Total Minutes 8  -CH       Total Minutes 8  -CH                User Key  (r) = Recorded By, (t) = Taken By, (c) = Cosigned By      Initials Name Provider Type     Kim Almendarez PT Physical Therapist                  Therapy Charges for Today       Code  Description Service Date Service Provider Modifiers Qty    41846239161  PT THERAPEUTIC ACT EA 15 MIN 11/12/2023 Kim Almendarez, PT GP 1    88963608920 HC PT EVAL MOD COMPLEXITY 3 11/12/2023 Kim Almendarez, PT GP 1    [id number]  PT THER SUPP EA 15 MIN 11/12/2023 Kim Almendarez, PT GP 1            PT G-Codes  Outcome Measure Options: AM-PAC 6 Clicks Basic Mobility (PT)  AM-PAC 6 Clicks Score (PT): 10  AM-PAC 6 Clicks Score (OT): 6  Modified [place] Scale: 4 - Moderately severe disability.  Unable to walk without assistance, and unable to attend to own bodily needs without assistance.  PT Discharge Summary  Anticipated Discharge Disposition (PT): skilled nursing facility, inpatient rehabilitation facility    Kim Almendarez, PT  11/12/2023

## 2023-11-13 ENCOUNTER — APPOINTMENT (OUTPATIENT)
Dept: GENERAL RADIOLOGY | Facility: HOSPITAL | Age: 33
DRG: 219 | End: 2023-11-13
Payer: MEDICARE

## 2023-11-13 LAB
ABO GROUP BLD: NORMAL
ANION GAP SERPL CALCULATED.3IONS-SCNC: 12 MMOL/L (ref 5–15)
ARTERIAL PATENCY WRIST A: POSITIVE
ATMOSPHERIC PRESS: 758.2 MMHG
BASE EXCESS BLDA CALC-SCNC: -0.7 MMOL/L (ref 0–2)
BDY SITE: ABNORMAL
BLD GP AB SCN SERPL QL: NEGATIVE
BUN SERPL-MCNC: 32 MG/DL (ref 6–20)
BUN/CREAT SERPL: 7.4 (ref 7–25)
CALCIUM SPEC-SCNC: 8.5 MG/DL (ref 8.6–10.5)
CHLORIDE SERPL-SCNC: 99 MMOL/L (ref 98–107)
CO2 BLDA-SCNC: 22.3 MMOL/L (ref 23–27)
CO2 SERPL-SCNC: 22 MMOL/L (ref 22–29)
CREAT SERPL-MCNC: 4.34 MG/DL (ref 0.57–1)
DEPRECATED RDW RBC AUTO: 46.3 FL (ref 37–54)
DEVICE COMMENT: ABNORMAL
EGFRCR SERPLBLD CKD-EPI 2021: 13.3 ML/MIN/1.73
ERYTHROCYTE [DISTWIDTH] IN BLOOD BY AUTOMATED COUNT: 15.1 % (ref 12.3–15.4)
FERRITIN SERPL-MCNC: 3424 NG/ML (ref 13–150)
GLUCOSE BLDC GLUCOMTR-MCNC: 112 MG/DL (ref 70–130)
GLUCOSE BLDC GLUCOMTR-MCNC: 116 MG/DL (ref 70–130)
GLUCOSE BLDC GLUCOMTR-MCNC: 125 MG/DL (ref 70–130)
GLUCOSE BLDC GLUCOMTR-MCNC: 89 MG/DL (ref 70–130)
GLUCOSE BLDC GLUCOMTR-MCNC: 90 MG/DL (ref 70–130)
GLUCOSE SERPL-MCNC: 118 MG/DL (ref 65–99)
HCO3 BLDA-SCNC: 21.5 MMOL/L (ref 22–28)
HCT VFR BLD AUTO: 23.8 % (ref 34–46.6)
HEMODILUTION: NO
HGB BLD-MCNC: 8 G/DL (ref 12–15.9)
INHALED O2 CONCENTRATION: 21 %
IRON 24H UR-MRATE: 17 MCG/DL (ref 37–145)
IRON SATN MFR SERPL: 12 % (ref 20–50)
MCH RBC QN AUTO: 28.6 PG (ref 26.6–33)
MCHC RBC AUTO-ENTMCNC: 33.6 G/DL (ref 31.5–35.7)
MCV RBC AUTO: 85 FL (ref 79–97)
MODALITY: ABNORMAL
O2 A-A PPRESDIFF RESPIRATORY: 0.6 MMHG
PCO2 BLDA: 25.8 MM HG (ref 35–45)
PH BLDA: 7.53 PH UNITS (ref 7.35–7.45)
PLATELET # BLD AUTO: 100 10*3/MM3 (ref 140–450)
PO2 BLDA: 73 MM HG (ref 80–100)
POTASSIUM SERPL-SCNC: 3.5 MMOL/L (ref 3.5–5.2)
RBC # BLD AUTO: 2.8 10*6/MM3 (ref 3.77–5.28)
RH BLD: POSITIVE
SAO2 % BLDCOA: 96.4 % (ref 92–98.5)
SODIUM SERPL-SCNC: 133 MMOL/L (ref 136–145)
T&S EXPIRATION DATE: NORMAL
TIBC SERPL-MCNC: 148 MCG/DL (ref 298–536)
TOTAL RATE: 16 BREATHS/MINUTE
TRANSFERRIN SERPL-MCNC: 99 MG/DL (ref 200–360)
TSH SERPL DL<=0.05 MIU/L-ACNC: 5.99 UIU/ML (ref 0.27–4.2)
WBC NRBC COR # BLD: 10.66 10*3/MM3 (ref 3.4–10.8)

## 2023-11-13 PROCEDURE — 82948 REAGENT STRIP/BLOOD GLUCOSE: CPT

## 2023-11-13 PROCEDURE — 25010000002 LACOSAMIDE 200 MG/20ML SOLUTION

## 2023-11-13 PROCEDURE — 86901 BLOOD TYPING SEROLOGIC RH(D): CPT | Performed by: NURSE PRACTITIONER

## 2023-11-13 PROCEDURE — 84443 ASSAY THYROID STIM HORMONE: CPT | Performed by: PSYCHIATRY & NEUROLOGY

## 2023-11-13 PROCEDURE — 80048 BASIC METABOLIC PNL TOTAL CA: CPT

## 2023-11-13 PROCEDURE — 86901 BLOOD TYPING SEROLOGIC RH(D): CPT

## 2023-11-13 PROCEDURE — 85027 COMPLETE CBC AUTOMATED: CPT | Performed by: NURSE PRACTITIONER

## 2023-11-13 PROCEDURE — 94799 UNLISTED PULMONARY SVC/PX: CPT

## 2023-11-13 PROCEDURE — 25010000002 THIAMINE HCL 200 MG/2ML SOLUTION: Performed by: PSYCHIATRY & NEUROLOGY

## 2023-11-13 PROCEDURE — 86900 BLOOD TYPING SEROLOGIC ABO: CPT | Performed by: NURSE PRACTITIONER

## 2023-11-13 PROCEDURE — 97530 THERAPEUTIC ACTIVITIES: CPT

## 2023-11-13 PROCEDURE — 86920 COMPATIBILITY TEST SPIN: CPT

## 2023-11-13 PROCEDURE — 25010000002 LEVETRIRACETAM PER 10 MG: Performed by: INTERNAL MEDICINE

## 2023-11-13 PROCEDURE — 86850 RBC ANTIBODY SCREEN: CPT | Performed by: NURSE PRACTITIONER

## 2023-11-13 PROCEDURE — 99024 POSTOP FOLLOW-UP VISIT: CPT | Performed by: THORACIC SURGERY (CARDIOTHORACIC VASCULAR SURGERY)

## 2023-11-13 PROCEDURE — 36600 WITHDRAWAL OF ARTERIAL BLOOD: CPT

## 2023-11-13 PROCEDURE — 83540 ASSAY OF IRON: CPT | Performed by: INTERNAL MEDICINE

## 2023-11-13 PROCEDURE — 82728 ASSAY OF FERRITIN: CPT | Performed by: INTERNAL MEDICINE

## 2023-11-13 PROCEDURE — 84466 ASSAY OF TRANSFERRIN: CPT | Performed by: INTERNAL MEDICINE

## 2023-11-13 PROCEDURE — C9254 INJECTION, LACOSAMIDE: HCPCS

## 2023-11-13 PROCEDURE — 71045 X-RAY EXAM CHEST 1 VIEW: CPT

## 2023-11-13 PROCEDURE — 25010000002 HEPARIN (PORCINE) PER 1000 UNITS

## 2023-11-13 PROCEDURE — 99233 SBSQ HOSP IP/OBS HIGH 50: CPT | Performed by: PSYCHIATRY & NEUROLOGY

## 2023-11-13 PROCEDURE — 86900 BLOOD TYPING SEROLOGIC ABO: CPT

## 2023-11-13 PROCEDURE — 94761 N-INVAS EAR/PLS OXIMETRY MLT: CPT

## 2023-11-13 PROCEDURE — 82803 BLOOD GASES ANY COMBINATION: CPT

## 2023-11-13 PROCEDURE — 99232 SBSQ HOSP IP/OBS MODERATE 35: CPT | Performed by: INTERNAL MEDICINE

## 2023-11-13 PROCEDURE — 94664 DEMO&/EVAL PT USE INHALER: CPT

## 2023-11-13 RX ORDER — THIAMINE HYDROCHLORIDE 100 MG/ML
200 INJECTION, SOLUTION INTRAMUSCULAR; INTRAVENOUS EVERY 8 HOURS
Status: COMPLETED | OUTPATIENT
Start: 2023-11-13 | End: 2023-11-14

## 2023-11-13 RX ADMIN — THIAMINE HYDROCHLORIDE 200 MG: 100 INJECTION, SOLUTION INTRAMUSCULAR; INTRAVENOUS at 17:28

## 2023-11-13 RX ADMIN — DIPHENHYDRAMINE HYDROCHLORIDE AND LIDOCAINE HYDROCHLORIDE AND ALUMINUM HYDROXIDE AND MAGNESIUM HYDRO 10 ML: KIT at 15:30

## 2023-11-13 RX ADMIN — Medication 4 ML: at 07:12

## 2023-11-13 RX ADMIN — DIPHENHYDRAMINE HYDROCHLORIDE AND LIDOCAINE HYDROCHLORIDE AND ALUMINUM HYDROXIDE AND MAGNESIUM HYDRO 10 ML: KIT at 08:34

## 2023-11-13 RX ADMIN — IPRATROPIUM BROMIDE AND ALBUTEROL SULFATE 3 ML: 2.5; .5 SOLUTION RESPIRATORY (INHALATION) at 07:11

## 2023-11-13 RX ADMIN — HEPARIN SODIUM 3000 UNITS: 1000 INJECTION INTRAVENOUS; SUBCUTANEOUS at 12:55

## 2023-11-13 RX ADMIN — DIPHENHYDRAMINE HYDROCHLORIDE AND LIDOCAINE HYDROCHLORIDE AND ALUMINUM HYDROXIDE AND MAGNESIUM HYDRO 10 ML: KIT at 20:35

## 2023-11-13 RX ADMIN — MUPIROCIN 1 APPLICATION: 20 OINTMENT TOPICAL at 20:35

## 2023-11-13 RX ADMIN — LEVETIRACETAM 250 MG: 500 INJECTION, SOLUTION INTRAVENOUS at 08:35

## 2023-11-13 RX ADMIN — LACOSAMIDE 50 MG: 10 INJECTION INTRAVENOUS at 00:19

## 2023-11-13 RX ADMIN — PANTOPRAZOLE SODIUM 40 MG: 40 INJECTION, POWDER, FOR SOLUTION INTRAVENOUS at 05:41

## 2023-11-13 RX ADMIN — MUPIROCIN 1 APPLICATION: 20 OINTMENT TOPICAL at 08:34

## 2023-11-13 RX ADMIN — CARVEDILOL 25 MG: 25 TABLET, FILM COATED ORAL at 08:34

## 2023-11-13 RX ADMIN — LEVETIRACETAM 250 MG: 500 INJECTION, SOLUTION INTRAVENOUS at 20:35

## 2023-11-13 RX ADMIN — CARVEDILOL 25 MG: 25 TABLET, FILM COATED ORAL at 20:35

## 2023-11-13 RX ADMIN — AMLODIPINE BESYLATE 10 MG: 10 TABLET ORAL at 08:34

## 2023-11-13 RX ADMIN — 0.12% CHLORHEXIDINE GLUCONATE 15 ML: 1.2 RINSE ORAL at 20:35

## 2023-11-13 RX ADMIN — 0.12% CHLORHEXIDINE GLUCONATE 15 ML: 1.2 RINSE ORAL at 08:34

## 2023-11-13 NOTE — PLAN OF CARE
Goal Outcome Evaluation:     Outcome Evaluation: Attempted to see pt for possible diet upgrade, however pt currently in dialysis per RN. Will continue to follow as appropriate.

## 2023-11-13 NOTE — NURSING NOTE
HD completed. 1500ml removed. access very positional. No complications. Verbal report given. Post TX /85 HR 97

## 2023-11-13 NOTE — PROGRESS NOTES
"  Daily Progress Note.   [facility] CARDIOVASC UNIT  11/13/2023    Patient:  Name:  Dee Herrera  MRN:  [id number]  1990  33 y.o.  female         CC/reason for visit:status post aortic aneurysm/dissection repair with valve sparing procedure.  Postoperative respiratory insufficiency    Interval History:  Patient is on room air sats 100%  T max overnight 37.8  Will awaken with vigorous stimulation denies any chest pain soa.  Otherwise will not answer questions.    Physical Exam:  /91 (BP Location: Left arm, Patient Position: Lying)   Pulse 92   Temp 99.2 °F (37.3 °C) (Oral)   Resp 16   Ht 165 cm (64.96\")   Wt 61.1 kg (134 lb 11.2 oz) Comment: Verified with RN  LMP 08/10/2022 (Approximate)   SpO2 100%   BMI 22.44 kg/m²   Body mass index is 22.44 kg/m².    Intake/Output Summary (Last 24 hours) at 11/13/2023 0731  Last data filed at 11/13/2023 0505  Gross per 24 hour   Intake 200 ml   Output 0 ml   Net 200 ml     General appearance: Nontoxic, conversant   Eyes: anicteric sclerae, moist conjunctiva  HENT:  oropharynx clear positive core track  Lungs: margarito wheeze or crackles, with unlabored respiratory effort and no intercostal retractions  No significant lower extremity edema pulses intact  Skin: WWP no diffuse visible rash today  Psych: Asleep however will awaken, alert   Neuro: CN II-XII. Speech intact.  Response to questions appropriately    Data Review:  Notable Labs:  Results from last 7 days   Lab Units 11/12/23 0359 11/11/23  0309 11/10/23  0311 11/09/23  0306 11/08/23  0258 11/07/23  0256 11/06/23  1754   WBC 10*3/mm3 11.93* 9.87 9.25 8.95 10.72 9.25 8.73   HEMOGLOBIN g/dL 8.0* 8.5* 8.7* 8.8* 10.0* 8.9* 8.6*   PLATELETS 10*3/mm3 93* 86* 87* 92* 112* 88* 92*     Results from last 7 days   Lab Units 11/13/23 0258 11/12/23 0359 11/11/23  1643 11/11/23  0309 11/10/23  0311 11/09/23  0306 11/08/23  0258 11/07/23  0256   SODIUM mmol/L 133* 134*  --  136 135* 137 136 140   POTASSIUM " mmol/L 3.5 3.6  --  3.2* 3.4* 3.5 3.6 4.0   CHLORIDE mmol/L 99 99  --  104 104 105 103 106   CO2 mmol/L 22.0 22.0  --  21.5* 22.0 22.2 22.0 20.0*   BUN mg/dL 32* 21*  --  42* 24* 29* 18 30*   CREATININE mg/dL 4.34* 3.16*  --  4.76* 3.55* 4.58* 3.24* 4.75*   GLUCOSE mg/dL 118* 106*  --  122* 117* 114* 124* 89   CALCIUM mg/dL 8.5* 8.3*  --  8.4* 8.7 8.7 8.8 8.3*   MAGNESIUM mg/dL  --   --   --  1.9  --  2.0 1.8 1.9   PHOSPHORUS mg/dL  --  3.1 0.6* 1.4* 1.8* 2.8 3.1 5.2*   Estimated Creatinine Clearance: 18.1 mL/min (A) (by C-G formula based on SCr of 4.34 mg/dL (H)).    Results from last 7 days   Lab Units 11/13/23  0258 11/12/23  0359 11/11/23  0309 11/10/23  0311 11/09/23  0306 11/08/23  0258 11/07/23  0256   AST (SGOT) U/L  --   --   --   --  21 22 23   ALT (SGPT) U/L  --   --   --   --  <5 <5 <5   PROCALCITONIN ng/mL  --   --  5.05*  --   --   --   --    FERRITIN ng/mL 3,424.00*  --   --   --   --   --   --    PLATELETS 10*3/mm3  --  93* 86* 87* 92* 112* 88*       Results from last 7 days   Lab Units 11/10/23  0736 11/09/23  1443 11/08/23  1409 11/07/23  2259 11/06/23  1607 11/06/23  1328   PH, ARTERIAL pH units 7.461* 7.499* 7.463* 7.471* 7.4210 7.448   PCO2, ARTERIAL mm Hg 30.6* 29.9* 30.9* 27.2*  --  20.2*   PO2 ART mm Hg 166.0* 158.3* 141.5* 153.9*  --  110.1*   HCO3 ART mmol/L 21.9* 23.3 22.1 19.9*  --  13.9*       Imaging:  Reviewed chest images personally from past 3 days    Chest x-ray vascular congestion bilateral effusions    ASSESSMENT  /  PLAN:  Bilateral pleural effusions  Respiratory sufficiency postoperative  Elevated procalcitonin  Ascending aortic aneurysm with dissection- s/p ascending aortic dissection repair/proximal replacement, gacron interposition graft, kelli procedure; insertion of right femoral shiley   Cardiac tamponade- reexploration for bleeding, removal of large clot compressing the RV- POD#4 [place]     Thrombocytopenia    Hypertension secondary to other renal disorders     Pancytopenia    Systemic lupus erythematosus - holding IS    End stage renal disease on dialysis    Seizure disorder    Elevated liver function tests    Essential hypertension    Peritoneal dialysis catheter in place    Anemia due to chronic kidney disease, on chronic dialysis    Hyponatremia    Moderate malnutrition    Chronic diastolic CHF (congestive heart failure)    Aortic valve lesion    Severe aortic valve regurgitation     From pulmonary perspective:  Continue hemodialysis per nephrology monitor for pleural effusions.  If unable to remove the left fluid with effective Hd would consider thoracentesis or if patient spikes fevers and infectious source evaluation is needed.    ID has evaluated patient and felt no abx indicated at this time.    Pretty lethargic, are antiepilpetic meds contributing?  Abg reviewed no hypercapnia.  Work up per medicine team.    Dw CTS team, neuro consulted.        Electronically signed by Govind Edmond MD, 11/13/23, 10:13 AM EST.

## 2023-11-13 NOTE — PROGRESS NOTES
"DOS: 2023  NAME: Dee Herrera   : 1990  PCP: Margarita Woods APRN  Chief Complaint   Patient presents with    Shortness of Breath       Chief complaint: weakness, lethargy  Subjective: Patient new to me today.  This is a 33-year-old female with a very complicated past medical history including lupus, end-stage renal disease, epilepsy.  During this admission she underwent cardiothoracic surgery for an aortic dissection.  Postoperatively she was seen by my colleague Dr. Guillen for seizures.  She ultimately had several complex partial seizures with secondary generalization.  Continuous EEG revealed sharp waves but no electrographic seizures.  Keppra and Vimpat were started initially on higher doses but were changed to renal doses several days ago.  I was reconsulted for persistent lethargy and generalized weakness.  Family feels like this has been persistent since surgery.  They deny any recurrent seizures since last seen by our group.    Objective:  Vital signs: /90 (BP Location: Left arm, Patient Position: Lying)   Pulse 98   Temp 99.3 °F (37.4 °C) (Oral)   Resp 16   Ht 165 cm (64.96\")   Wt 61.1 kg (134 lb 11.2 oz) Comment: Verified with RN  LMP 08/10/2022 (Approximate)   SpO2 98%   BMI 22.44 kg/m²    Gen: NAD, vitals reviewed  MS: Arouses to voice but quickly falls back to sleep, follows commands  CN: Visual fields full, pupils 3 mm, reactive, conjugate gaze with roving eye movements  Motor: 4+/5 bilateral upper and lower extremities  Sensory: intact to cold temperature and vibration throughout    Laboratory results:  Lab Results   Component Value Date    GLUCOSE 118 (H) 2023    CALCIUM 8.5 (L) 2023     (L) 2023    K 3.5 2023    CO2 22.0 2023    CL 99 2023    BUN 32 (H) 2023    CREATININE 4.34 (H) 2023    EGFRIFAFRI 107 2021    BCR 7.4 2023    ANIONGAP 12.0 2023     Lab Results   Component Value Date    WBC " 10.66 11/13/2023    HGB 8.0 (L) 11/13/2023    HCT 23.8 (L) 11/13/2023    MCV 85.0 11/13/2023     (L) 11/13/2023     Lab Results   Component Value Date    LDL 44 12/09/2022    LDL 25 06/25/2021            Review of labs: Sodium 133, BUN 32, creatinine 4.3, hemoglobin 8.0, platelets 100, pH 7.5-7    Review and interpretation of imaging: CT head from 11/4 earlier this admission personally reviewed.  This showed mild generalized atrophy given her age as well as small acute subdural hematomas.  These were stable from her scan the day before.  No head imaging since that time    Diagnoses:  Encephalopathy  End-stage renal disease  Systemic lupus erythematosus  Epilepsy, focal onset with complex partial seizures, not intractable, not in status  Status post aortic dissection repair    Comment: Persistent lethargy.  Timeline is not particularly clear.  Family feels that this has been fairly prolonged.  No recurrent seizures since we were last involved.  Her current doses of Keppra and Vimpat are appropriate for ESRD and would be less likely to cause significant sedation.  Given her prior CT head which showed subdural hematomas I would like to get a follow-up CT head tonight to exclude any progression of that.  Depending on clinical progression we could consider further work-up including MRI, repeat continuous EEG and potentially even CSF testing.    Plan:  1.  Continue Keppra 250 twice daily with additional 250 after dialysis and Vimpat 50 twice daily  2.  Repeat the head CT routine  3.  B12, TSH, ammonia.  Empiric IV thiamine as a precaution    Total visit time 50 minutes including chart/imaging review, patient assessment, care coordination, patient/family education.

## 2023-11-13 NOTE — PLAN OF CARE
Goal Outcome Evaluation:  Plan of Care Reviewed With: (P) patient           Outcome Evaluation: (P) Pt UIC upon PT arrival and very sleepy on this date. Pt able to follow comands, but only opened eyes once this session. Required mod ax2 for STS and taking steps from chair to bed. Dependent x2 for sit>supine and re-positioning in bed. PT will continue to follow for functional impairments and progress activity as pt tolerates.      Anticipated Discharge Disposition (PT): (P) skilled nursing facility, inpatient rehabilitation facility

## 2023-11-13 NOTE — PROGRESS NOTES
[facility]    Continuing to monitor patients progress with therapies and medical stability.    Thank you,  Regi Ramirez, RN  Rehab Admission Nurse

## 2023-11-13 NOTE — PROGRESS NOTES
"Nutrition Services    Patient Name:  Dee Herrera  YOB: 1990  MRN: [id number]  Admit Date:  10/26/2023    Assessment Date:  11/13/23    Summary: TF assessment/calorie count:   Current TF's: Novasource Renal @ 35 ml/hr (goal 35 ml/hr) w/ 30 ml q 4 hrs free water flushes via cortrak (ND) to run from 8p-8a (12 hours/day).  +BM 11/13  Extubated.  Passed SLP evaluation 11/11.  Calorie count (3 day) started today    Recommend:  Continue current nocturnal TF regimen.  Follow closely for PO intake per calorie count.    RD to follow closely.    CLINICAL NUTRITION ASSESSMENT      Reason for Assessment Calorie Count, Nurse or Nurse Practitioner Consult, TF Assessment      Diagnosis/Problem SOA, hyponatremia, ESRD on dialysis, abd pain     Anthropometrics        Current Height  Current Weight  BMI kg/m2 Height: 165 cm (64.96\")  Weight: 61.1 kg (134 lb 11.2 oz) (Verified with RN) (11/13/23 0649)  Body mass index is 22.44 kg/m².   Adjusted BMI (if applicable)    BMI Category Normal/Healthy (18.4 - 24.9)   Ideal Body Weight (IBW) 125lb   Usual Body Weight (UBW) 120-155   Weight Trend Loss, 5lbs recently, 35lb overall   --  Estimated/Assessed Needs        Current Weight  Weight: 55 kg (121 lb 4.1 oz) (11/06/23 1340)       Energy Requirements    Weight for Calculation 52.2 kg   Method for Estimation  30-35 kcal/kg   EST Needs (kcal/day) 0942-2439       Protein Requirements    Weight for Calculation 52.2 kg   EST Protein Needs (g/kg) 1.0 gm/kg, 1.5 gm/kg   EST Daily Needs (g/day) 52-78       Fluid Requirements     Method for Estimation 1 mL/kcal    EST Needs (mL/day)      Labs       Pertinent Labs    Results from last 7 days   Lab Units 11/13/23  0258 11/12/23  0359 11/11/23  0309 11/10/23  0311 11/09/23  0306 11/08/23  0258 11/07/23  0256   SODIUM mmol/L 133* 134* 136   < > 137 136 140   POTASSIUM mmol/L 3.5 3.6 3.2*   < > 3.5 3.6 4.0   CHLORIDE mmol/L 99 99 104   < > 105 103 106   CO2 mmol/L 22.0 22.0 21.5*   < > " 22.2 22.0 20.0*   BUN mg/dL 32* 21* 42*   < > 29* 18 30*   CREATININE mg/dL 4.34* 3.16* 4.76*   < > 4.58* 3.24* 4.75*   CALCIUM mg/dL 8.5* 8.3* 8.4*   < > 8.7 8.8 8.3*   BILIRUBIN mg/dL  --   --   --   --  0.3 0.4 0.3   ALK PHOS U/L  --   --   --   --  67 64 50   ALT (SGPT) U/L  --   --   --   --  <5 <5 <5   AST (SGOT) U/L  --   --   --   --  21 22 23   GLUCOSE mg/dL 118* 106* 122*   < > 114* 124* 89    < > = values in this interval not displayed.     Results from last 7 days   Lab Units 11/13/23 0811 11/12/23 0359 11/11/23  1643 11/11/23  0309 11/10/23  0311 11/09/23  0306 11/08/23  0258   MAGNESIUM mg/dL  --   --   --  1.9  --  2.0 1.8   PHOSPHORUS mg/dL  --  3.1   < > 1.4*   < > 2.8 3.1   HEMOGLOBIN g/dL 8.0* 8.0*  --  8.5*   < > 8.8* 10.0*   HEMATOCRIT % 23.8* 23.9*  --  25.3*   < > 27.0* 30.0*   WBC 10*3/mm3 10.66 11.93*  --  9.87   < > 8.95 10.72   ALBUMIN g/dL  --   --   --  2.7*   < > 2.9* 3.2*    < > = values in this interval not displayed.     Results from last 7 days   Lab Units 11/13/23  0811 11/12/23  0359 11/11/23  0309 11/10/23  0311 11/09/23  0306 11/07/23  0256 11/06/23  1754 11/06/23  1736 11/06/23  1631   INR   --   --   --   --   --   --  1.50* 1.6* 1.69*   APTT seconds  --   --   --   --   --   --  34.3  --  35.1   PLATELETS 10*3/mm3 100* 93* 86* 87* 92*   < > 92*  --  123*    < > = values in this interval not displayed.     COVID19   Date Value Ref Range Status   10/31/2023 Not Detected Not Detected - Ref. Range Final     Lab Results   Component Value Date    HGBA1C 5.10 10/30/2023          Medications           Scheduled Medications amLODIPine, 10 mg, Oral, Q24H  [Held by provider] aspirin, 81 mg, Oral, Daily  carvedilol, 25 mg, Oral, Q12H  chlorhexidine, 15 mL, Mouth/Throat, Q12H  First Mouthwash (Magic Mouthwash), 10 mL, Swish & Spit, Q6H  [Held by provider] heparin (porcine), 5,000 Units, Subcutaneous, Q8H  insulin regular, 2-7 Units, Subcutaneous, Q6H  Lacosamide, 50 mg, Intravenous,  Q12H  levETIRAcetam, 250 mg, Intravenous, Once  levETIRAcetam, 250 mg, Intravenous, Q12H  mupirocin, , Each Nare, BID  pantoprazole, 40 mg, Intravenous, Q AM       Infusions sodium chloride, 30 mL/hr, Last Rate: 30 mL/hr (23 1400)       PRN Medications   acetaminophen **OR** acetaminophen **OR** acetaminophen    bisacodyl    bisacodyl    dextrose    dextrose    glucagon (human recombinant)    heparin (porcine)    hydrALAZINE    ipratropium-albuterol    [] Morphine **AND** naloxone    nitroglycerin    ondansetron    polyethylene glycol    Potassium Replacement - Follow Nurse / BPA Driven Protocol     Physical Findings          General Findings disoriented, room air   Oral/Mouth Cavity dry mouth, other:lesions   Edema  lower extremity , 1+ (trace)   Gastrointestinal diarrhea, non-distended , normoactive, last bowel movement:    Skin  surgical incision: sternal, abdomen   Tubes/Drains/Lines Cortrak, dialysis catheter, ND tube, bridle in place @105 cm   NFPE See Malnutrition Severity Assessment   --  Malnutrition Severity Assessment      Patient meets criteria for : Moderate (non-severe) Malnutrition       Current Nutrition Orders & Evaluation of Intake       Oral Nutrition     Food Allergies NKFA   Current PO Diet Diet: Regular/House Diet; Feeding Assistance - Nursing; Texture: Soft to Chew (NDD 3); Soft to Chew: Chopped Meat; Fluid Consistency: Thin (IDDSI 0)   Supplement n/a   PO Evaluation     % PO Intake N/a    Factors Affecting Intake: altered respiratory status   --   Enteral Nutrition     Enteral Route ND    TF Delivery Method Cyclic    Propofol Rate/Kcal     Current TF Order/Rate  Novasource Renal @ 35 mL/hr    TF Goal Rate 35 mL/hr x 12 hours/day (8p-8a)    Current Water Flush 30 mL Q 4 hr    Modular None    TF Residual  n/a - tube is small bowel    TF Tolerance tolerating    TF Observation Other: patient off unit to HD at time of attempted visit     PES STATEMENT / NUTRITION DIAGNOSIS       Nutrition Dx Problem  Problem: Unintentional Weight Loss, Malnutrition (moderate), and Inadequate Oral Intake  Etiology: Factors Affecting Nutrition - decrease appetite, abd pain    Signs/Symptoms: PO intake, NFPE Results, Unintended Weight Change, and Report/Observation     NUTRITION INTERVENTION / PLAN OF CARE      Intervention Goal(s) Maintain nutrition status, Reduce/improve symptoms, Meet estimated needs, Disease management/therapy, Tolerate PO , Increase intake, Maintain TF/PN, and Maintain weight         RD Intervention/Action Await initiation of EN/PN, Continue to monitor, Care plan reviewed, and Recommend/order: EN   --      Prescription/Orders:       PO Diet       Supplements       Enteral Nutrition    Enteral Prescription:     Enteral Route ND    TF Delivery Method Cyclic    Enteral Product Novasource Renal    Modular None    Propofol Rate/Kcal     TF Start Rate  35 mL/hr     TF Goal Rate  35 mL/hr x 12 hours/day (8p-8a)    Free Water Flush 30 mL Q 4 hr    Provision at Goal:          Calories 840 kcal, meets 54 % needs         Protein  38 gm protein, meets 73 % needs         Fluid (mL) 302 mL free water + 180 mL in flushes    Prescription Ordered Continue same per protocol         Parenteral Nutrition    New Prescription Ordered? Continue same per protocol   --      Monitor/Evaluation Per protocol, PO intake, Pertinent labs, EN delivery/tolerance, Weight, Skin status, GI status, Symptoms, Swallow function   Discharge Plan/Needs Pending clinical course   --    RD to follow per protocol.      Electronically signed by:  Shell Horn RD  11/13/23 14:35 EST

## 2023-11-13 NOTE — THERAPY TREATMENT NOTE
Patient Name: Dee Herrera  : 1990    MRN: [id number]                              Today's Date: 2023       Admit Date: 10/26/2023    Visit Dx:     ICD-10-CM ICD-9-CM   1. Shortness of breath  R06.02 786.05   2. ESRD (end stage renal disease) on dialysis  N18.6 585.6    Z99.2 V45.11   3. Hyponatremia  E87.1 276.1   4. Generalized abdominal pain  R10.84 789.07   5. Elevated lactic acid level  R79.89 276.2   6. Elevated troponin  R79.89 790.6   7. Severe aortic valve regurgitation  I35.1 424.1   8. 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 syndrome    Systemic lupus erythematosus    Lupus nephritis, ISN/RPS class IV    Hypokalemia    Hypocalcemia    COVID-19    Hospital discharge follow-up    Stage 5 chronic kidney disease    Cardiac cirrhosis    Pancreatitis    Duodenitis    Regional enteritis of small bowel    Pericardial effusion    End stage renal disease on dialysis    Hemodialysis status    Seizure disorder    Elevated liver function tests    C. difficile colitis    Anemia, chronic disease    Essential hypertension    Peritoneal dialysis catheter in place    Anemia due to chronic kidney disease, on chronic dialysis    Alternating constipation and diarrhea    Abnormal stress test    Hyponatremia    Poor appetite    Moderate malnutrition    Chronic diastolic CHF (congestive heart failure)    Aortic valve lesion    Severe aortic valve regurgitation    Dissecting ascending aortic aneurysm     Past Medical History:   Diagnosis Date    Anasarca     PER CT SCAN    Dry skin     ESRD (end stage renal disease) on dialysis     TUES, THURS,  SAT UMAIR [facility]ASER    History of abdominal pain     History of anemia     History of transfusion     Hypertension     Iron deficiency anemia 09/27/2021    Lupus (systemic lupus erythematosus) 07/30/2022    Migraine     Other specified nutritional anemias     Pericardial effusion     Renal insufficiency     Seizures     STATES LAST WAS 1/2023    Shortness of breath     OCCASIONAL    Vitamin D deficiency 09/27/2021     Past Surgical History:   Procedure Laterality Date    ASCENDING AORTIC ANEURYSM REPAIR W/ MECHANICAL AORTIC VALVE REPLACEMENT N/A 11/2/2023    Procedure: CHETNA STERNOTOMY, AORTIC ROOT REPLACEMENT WITH VALVE SPARING MISHEL PROCEDURE, REPLACEMENT OF ASCENDING AORTA, RIGHT FEMORAL DIALYSIS CATHETER PLACEMENT AND PRP;  Surgeon: Chris Medina MD;  Location: [facility] CVOR;  Service: Cardiothoracic;  Laterality: N/A;    CARDIAC CATHETERIZATION N/A 06/14/2023    Procedure: Coronary angiography;  Surgeon: Juana Taylor MD;  Location: [facility] CATH INVASIVE LOCATION;  Service: Cardiovascular;  Laterality: N/A;    CARDIAC CATHETERIZATION N/A 06/14/2023    Procedure: Left heart cath;  Surgeon: Juana Taylor MD;  Location: [facility] CATH INVASIVE LOCATION;  Service: Cardiovascular;  Laterality: N/A;    CARDIAC CATHETERIZATION N/A 06/14/2023    Procedure: Right Heart Cath;  Surgeon: Juana Taylor MD;  Location: [facility] CATH INVASIVE LOCATION;  Service: Cardiovascular;  Laterality: N/A;    COLONOSCOPY N/A 7/20/2023    Procedure: COLONOSCOPY to cecum with biopsy;  Surgeon: Drew Kaminski MD;  Location: [facility] ENDOSCOPY;  Service: Gastroenterology;  Laterality: N/A;  PRE - diarrhea, constipation  POST - fair prep, normal    CORONARY ARTERY BYPASS GRAFT N/A 11/6/2023    Procedure: STERNAL EXPLORATION AND WASH OUT;  Surgeon: Jr Mitesh Quiroz MD;  Location: [facility] CVOR;  Service: Cardiothoracic;  Laterality: N/A;    ENDOSCOPY N/A 7/20/2023    Procedure: ESOPHAGOGASTRODUODENOSCOPY with biopsy;  Surgeon: Gordy  MD Drew;  Location: [facility] ENDOSCOPY;  Service: Gastroenterology;  Laterality: N/A;  PRE - abn ct abd  POST - gastritis    INSERTION HEMODIALYSIS CATHETER N/A 07/26/2022    Procedure: RIGHT TUNNELED DIALYSIS CATHETER PLACEMENT;  Surgeon: Diandra Adhikari MD;  Location: [facility] MAIN OR;  Service: Vascular;  Laterality: N/A;    INSERTION PERITONEAL DIALYSIS CATHETER N/A 04/03/2023    Procedure: INSERTION PERITONEAL DIALYSIS CATHETER LAPAROSCOPIC, omentumpexy;  Surgeon: Jemal Loyola MD;  Location: [facility] OR;  Service: General;  Laterality: N/A;    TONSILLECTOMY        General Information       Row Name 11/13/23 1240          Physical Therapy Time and Intention    Document Type therapy note (daily note)  -CH (r) SK (t) CH (c)     Mode of Treatment individual therapy;physical therapy  -CH (r) SK (t) CH (c)       Row Name 11/13/23 1240          General Information    Patient Profile Reviewed yes  -CH (r) SK (t) CH (c)     Existing Precautions/Restrictions fall;sternal;cardiac  -CH (r) SK (t) CH (c)       Row Name 11/13/23 1240          Cognition    Orientation Status (Cognition) unable/difficult to assess  pt very sleepy on this date  -CH (r) SK (t) CH (c)       Row Name 11/13/23 1240          Safety Issues, Functional Mobility    Safety Issues Affecting Function (Mobility) safety precaution awareness;safety precautions follow-through/compliance;sequencing abilities  -CH (r) SK (t) CH (c)     Impairments Affecting Function (Mobility) balance;coordination;endurance/activity tolerance;motor control;strength;shortness of breath;postural/trunk control;pain  -CH (r) SK (t) CH (c)     Comment, Safety Issues/Impairments (Mobility) non skid socks, gait belt  -CH (r) SK (t) CH (c)               User Key  (r) = Recorded By, (t) = Taken By, (c) = Cosigned By      Initials Name Provider Type    CH Kim Almendarez, PT Physical Therapist    Supriya Correia, PT Student PT Student                    Mobility       Row Name 11/13/23 1241          Bed Mobility    Bed Mobility sit-supine  -CH (r) SK (t) CH (c)     Sit-Supine [place] (Bed Mobility) verbal cues;nonverbal cues (demo/gesture);2 person assist;dependent (less than 25% patient effort)  -CH (r) SK (t) CH (c)     Assistive Device (Bed Mobility) draw sheet  -CH (r) SK (t) CH (c)     Comment, (Bed Mobility) pt very sleepy on this date, did not assist in bed mobility  -CH (r) SK (t) CH (c)       Row Name 11/13/23 1241          Sit-Stand Transfer    Sit-Stand [place] (Transfers) verbal cues;nonverbal cues (demo/gesture);moderate assist (50% patient effort);2 person assist  -CH (r) SK (t) CH (c)     Comment, (Sit-Stand Transfer) verbal cues for upright standing posture  -CH (r) SK (t) CH (c)       Row Name 11/13/23 1241          Gait/Stairs (Locomotion)    [place] Level (Gait) verbal cues;nonverbal cues (demo/gesture);moderate assist (50% patient effort);2 person assist  -CH (r) SK (t) CH (c)     Assistive Device (Gait) other (see comments)  B HHA  -CH (r) SK (t) CH (c)     Distance in Feet (Gait) 4 from chair to bed  -CH (r) SK (t) CH (c)     Deviations/Abnormal Patterns (Gait) amrita decreased;festinating/shuffling;gait speed decreased;stride length decreased  -CH (r) SK (t) CH (c)     Bilateral Gait Deviations forward flexed posture  -CH (r) SK (t) CH (c)               User Key  (r) = Recorded By, (t) = Taken By, (c) = Cosigned By      Initials Name Provider Type    CH Kim Almendarez, PT Physical Therapist    Supriya Correia PT Student PT Student                   Obj/Interventions       Row Name 11/13/23 1243          Balance    Balance Assessment standing static balance;standing dynamic balance  -CH (r) SK (t) CH (c)     Static Standing Balance moderate assist;verbal cues;non-verbal cues (demo/gesture);2-person assist  -CH (r) SK (t) CH (c)     Dynamic Standing Balance verbal cues;non-verbal cues (demo/gesture);2-person  assist;moderate assist  -CH (r) SK (t) CH (c)     Position/Device Used, Standing Balance other (see comments)  B HHA  -CH (r) SK (t) CH (c)               User Key  (r) = Recorded By, (t) = Taken By, (c) = Cosigned By      Initials Name Provider Type    Kim Gardiner, PT Physical Therapist    Supriya Correia, PT Student PT Student                   Goals/Plan    No documentation.                  Clinical Impression       Row Name 11/13/23 1243          Pain    Pre/Posttreatment Pain Comment no c/o pain on this date  -CH (r) SK (t) CH (c)       Row Name 11/13/23 1243          Plan of Care Review    Plan of Care Reviewed With patient  -CH (r) SK (t) CH (c)     Outcome Evaluation Pt [facility] upon PT arrival and very sleepy on this date. Pt able to follow comands, but only opened eyes once this session. Required mod ax2 for STS and taking steps from chair to bed. Dependent x2 for sit>supine and re-positioning in bed. PT will continue to follow for functional impairments and progress activity as pt tolerates.  -CH (r) SK (t) CH (c)       Row Name 11/13/23 1243          Positioning and Restraints    Pre-Treatment Position sitting in chair/recliner  -CH (r) SK (t) CH (c)     Post Treatment Position bed  -CH (r) SK (t) CH (c)     In Bed supine;notified nsg;with other staff  with transport, going to dialysis  -CH (r) SK (t) CH (c)               User Key  (r) = Recorded By, (t) = Taken By, (c) = Cosigned By      Initials Name Provider Type    Kim Gardiner, PT Physical Therapist    Supriya Correia, PT Student PT Student                   Outcome Measures       Row Name 11/13/23 1246 11/13/23 0849       How much help from another person do you currently need...    Turning from your back to your side while in flat bed without using bedrails? 1  -CH (r) SK (t) CH (c) 2  -TH    Moving from lying on back to sitting on the side of a flat bed without bedrails? 1  -CH (r) SK (t) CH (c) 2  -TH    Moving to  and from a bed to a chair (including a wheelchair)? 2  -CH (r) SK (t) CH (c) 2  -TH    Standing up from a chair using your arms (e.g., wheelchair, bedside chair)? 2  -CH (r) SK (t) CH (c) 2  -TH    Climbing 3-5 steps with a railing? 1  -CH (r) SK (t) CH (c) 1  -TH    To walk in hospital room? 2  -CH (r) SK (t) CH (c) 1  -TH    AM-PAC 6 Clicks Score (PT) 9  -CH (r) SK (t) 10  -TH    Highest level of mobility 3 --> Sat at edge of bed  -CH (r) SK (t) 4 --> Transferred to chair/commode  -TH      Row Name 11/13/23 1246          Functional Assessment    Outcome Measure Options AM-PAC 6 Clicks Basic Mobility (PT)  -CH (r) SK (t) CH (c)               User Key  (r) = Recorded By, (t) = Taken By, (c) = Cosigned By      Initials Name Provider Type     Kim Almendarez, PT Physical Therapist     Susanna Leal, RN Registered Nurse    Supriya Correia, PT Student PT Student                                 Physical Therapy Education       Title: PT OT SLP Therapies (In Progress)       Topic: Physical Therapy (Done)       Point: Mobility training (Done)       Learning Progress Summary             Patient Acceptance, E, VU,NR by SK at 11/13/2023 1247    Acceptance, E,TB,D, VU,NR by  at 11/12/2023 1148                         Point: Home exercise program (Done)       Learning Progress Summary             Patient Acceptance, E,TB,D, VU,NR by  at 11/12/2023 1148                         Point: Body mechanics (Done)       Learning Progress Summary             Patient Acceptance, E, VU,NR by SK at 11/13/2023 1247    Acceptance, E,TB,D, VU,NR by  at 11/12/2023 1148                         Point: Precautions (Done)       Learning Progress Summary             Patient Acceptance, E, VU,NR by SK at 11/13/2023 1247    Acceptance, E,TB,D, VU,NR by  at 11/12/2023 1148                                         User Key       Initials Effective Dates Name Provider Type [facility] 06/16/21 -  Kim Almendarez, PT  Physical Therapist PT    SK 10/10/23 -  Supriya Harris, PT Student PT Student PT                  PT Recommendation and Plan     Plan of Care Reviewed With: patient  Outcome Evaluation: Pt [facility] upon PT arrival and very sleepy on this date. Pt able to follow comands, but only opened eyes once this session. Required mod ax2 for STS and taking steps from chair to bed. Dependent x2 for sit>supine and re-positioning in bed. PT will continue to follow for functional impairments and progress activity as pt tolerates.     Time Calculation:         PT Charges       Row Name 11/13/23 1248             Time Calculation    Start Time 0922  -CH (r) SK (t) CH (c)      Stop Time 0930  -CH (r) SK (t) CH (c)      Time Calculation (min) 8 min  -CH (r) SK (t)      PT Received On 11/13/23  -CH (r) SK (t) CH (c)      PT - Next Appointment 11/14/23  -CH (r) SK (t) CH (c)         Time Calculation- PT    Total Timed Code Minutes- PT 8 minute(s)  -CH (r) SK (t) CH (c)         Timed Charges    41249 - PT Therapeutic Activity Minutes 8  -CH (r) SK (t) CH (c)         Total Minutes    Timed Charges Total Minutes 8  -CH (r) SK (t)       Total Minutes 8  -CH (r) SK (t)                User Key  (r) = Recorded By, (t) = Taken By, (c) = Cosigned By      Initials Name Provider Type     Kim Almendarez S, PT Physical Therapist    SK Supriya Harris, PT Student PT Student                  Therapy Charges for Today       Code Description Service Date Service Provider Modifiers Qty    [id number]  PT THERAPEUTIC ACT EA 15 MIN 11/13/2023 Supriya Harris, PT Student GP 1            PT G-Codes  Outcome Measure Options: AM-PAC 6 Clicks Basic Mobility (PT)  AM-PAC 6 Clicks Score (PT): 9  AM-PAC 6 Clicks Score (OT): 6  Modified Cocke Scale: 4 - Moderately severe disability.  Unable to walk without assistance, and unable to attend to own bodily needs without assistance.  PT Discharge Summary  Anticipated Discharge Disposition (PT): skilled  nursing facility, inpatient rehabilitation facility    Supriya Harris, PT Student  11/13/2023

## 2023-11-13 NOTE — PROGRESS NOTES
" LOS: 18 days   Patient Care Team:  Margarita Woods APRN as PCP - General (Nurse Practitioner)  Winnie Sanchez MD as Referring Physician (Obstetrics and Gynecology)  Norberto Almaraz MD PhD as Consulting Physician (Hematology and Oncology)  Lupis Thomas MD as Consulting Physician (Nephrology)  Hair Mckeon MD as Consulting Physician (Nephrology)  Juana Taylor MD as Consulting Physician (Cardiology)    Chief Complaint: post op    Subjective:  Symptoms:  No shortness of breath, cough or chest pain.    Diet:  Poor intake.  No nausea or vomiting.    Activity level: Impaired due to weakness.    Pain:  She complains of pain that is mild.  Pain is well controlled.      Very sleepy this morning. Does wake up with tactile stimulation    Vital Signs  Temp:  [98.7 °F (37.1 °C)-100 °F (37.8 °C)] 99.2 °F (37.3 °C)  Heart Rate:  [92-98] 92  Resp:  [16-18] 16  BP: (120-136)/() 129/91  Body mass index is 22.44 kg/m².    Intake/Output Summary (Last 24 hours) at 11/13/2023 0741  Last data filed at 11/13/2023 0505  Gross per 24 hour   Intake 200 ml   Output 0 ml   Net 200 ml     No intake/output data recorded.          11/11/23  0600 11/12/23  0401 11/13/23  0649   Weight: 52.9 kg (116 lb 10 oz) 51.7 kg (113 lb 15.7 oz) 61.1 kg (134 lb 11.2 oz) (Verified with RN)         Objective:  General Appearance:  Comfortable and in no acute distress.    Vital signs: (most recent): Blood pressure 128/87, pulse 91, temperature 99.3 °F (37.4 °C), temperature source Oral, resp. rate 16, height 165 cm (64.96\"), weight 61.1 kg (134 lb 11.2 oz), last menstrual period 08/10/2022, SpO2 98%, not currently breastfeeding.  Vital signs are normal.  No fever.    Output: No urine output and producing stool.    Lungs:  Normal effort and normal respiratory rate.  There are decreased breath sounds.    Heart: Normal rate.  Regular rhythm.  (SR on tele monitor)  Abdomen: Abdomen is soft.  Bowel sounds are normal.     Pulses: Distal " "pulses are intact.    Neurological: Patient is oriented to person, place and time.  (drowsy).    Skin:  Warm and dry.  (Sternal dressing clean, dry, and intact)          Results Review:        WBC WBC   Date Value Ref Range Status   11/12/2023 11.93 (H) 3.40 - 10.80 10*3/mm3 Final   11/11/2023 9.87 3.40 - 10.80 10*3/mm3 Final      HGB Hemoglobin   Date Value Ref Range Status   11/12/2023 8.0 (L) 12.0 - 15.9 g/dL Final   11/11/2023 8.5 (L) 12.0 - 15.9 g/dL Final      HCT Hematocrit   Date Value Ref Range Status   11/12/2023 23.9 (L) 34.0 - 46.6 % Final   11/11/2023 25.3 (L) 34.0 - 46.6 % Final      Platelets Platelets   Date Value Ref Range Status   11/12/2023 93 (L) 140 - 450 10*3/mm3 Final   11/11/2023 86 (L) 140 - 450 10*3/mm3 Final        PT/INR:  No results found for: \"PROTIME\"/No results found for: \"INR\"    Sodium Sodium   Date Value Ref Range Status   11/13/2023 133 (L) 136 - 145 mmol/L Final   11/12/2023 134 (L) 136 - 145 mmol/L Final   11/11/2023 136 136 - 145 mmol/L Final      Potassium Potassium   Date Value Ref Range Status   11/13/2023 3.5 3.5 - 5.2 mmol/L Final   11/12/2023 3.6 3.5 - 5.2 mmol/L Final   11/11/2023 3.2 (L) 3.5 - 5.2 mmol/L Final      Chloride Chloride   Date Value Ref Range Status   11/13/2023 99 98 - 107 mmol/L Final   11/12/2023 99 98 - 107 mmol/L Final   11/11/2023 104 98 - 107 mmol/L Final      Bicarbonate CO2   Date Value Ref Range Status   11/13/2023 22.0 22.0 - 29.0 mmol/L Final   11/12/2023 22.0 22.0 - 29.0 mmol/L Final   11/11/2023 21.5 (L) 22.0 - 29.0 mmol/L Final      BUN BUN   Date Value Ref Range Status   11/13/2023 32 (H) 6 - 20 mg/dL Final   11/12/2023 21 (H) 6 - 20 mg/dL Final   11/11/2023 42 (H) 6 - 20 mg/dL Final      Creatinine Creatinine   Date Value Ref Range Status   11/13/2023 4.34 (H) 0.57 - 1.00 mg/dL Final   11/12/2023 3.16 (H) 0.57 - 1.00 mg/dL Final   11/11/2023 4.76 (H) 0.57 - 1.00 mg/dL Final      Calcium Calcium   Date Value Ref Range Status   11/13/2023 8.5 " (L) 8.6 - 10.5 mg/dL Final   11/12/2023 8.3 (L) 8.6 - 10.5 mg/dL Final   11/11/2023 8.4 (L) 8.6 - 10.5 mg/dL Final      Magnesium Magnesium   Date Value Ref Range Status   11/11/2023 1.9 1.6 - 2.6 mg/dL Final          amLODIPine, 10 mg, Oral, Q24H  [Held by provider] aspirin, 81 mg, Oral, Daily  carvedilol, 25 mg, Oral, Q12H  chlorhexidine, 15 mL, Mouth/Throat, Q12H  First Mouthwash (Magic Mouthwash), 10 mL, Swish & Spit, Q6H  [Held by provider] heparin (porcine), 5,000 Units, Subcutaneous, Q8H  insulin regular, 2-7 Units, Subcutaneous, Q6H  Lacosamide, 50 mg, Intravenous, Q12H  levETIRAcetam, 250 mg, Intravenous, Once  levETIRAcetam, 250 mg, Intravenous, Q12H  mupirocin, , Each Nare, BID  pantoprazole, 40 mg, Intravenous, Q AM  sodium chloride, 4 mL, Nebulization, BID - RT      sodium chloride, 30 mL/hr, Last Rate: 30 mL/hr (11/02/23 1400)      Assessment & Plan  -Ascending aortic aneurysm with dissection- s/p ascending aortic dissection repair/proximal replacement, gacron interposition graft, kelli procedure; insertion of right femoral shiley- POD#11 Pagni  -cardiac tamponade- reexploration for bleeding, removal of large clot compressing the RV- POD#6 Camporrotondo  - severe aortic valve insufficiency  - ESRD on PD  - Lupus--on Cellcept/plaquenil; currently held  - hx of seizures  - chronic immunosuppression  - hypertension  - chronic anemia  - TCP--chronic; lovenox on hold     Sleepy this morning, sitting up in the chair. Does wake up with tactile stimulation and is able to answer questions, but then goes right back to sleep. Narcotics remain on hold. Anti-epileptics may be playing a role, will reach out to neuro. Check ABG  WBC improved this morning. TMAX 99.3   Weaned to RA and tolerating  Will discuss about discontinue her central line if she has good PIV access  Hgb stable at 8 this morning. Will discuss need for blood with Dr. Medina  Plan for HD today. Discussed with with Dr. Diaz, plan to remove femoral  dialysis catheter and resume PD in the next few days   Remains on nocturnal feels. She has a diet ordered, but it does not look like she is not eating much. Will order a calorie count  Remains in SR with rates in the 80s. Will discuss AV wires with Dr. Medina  She is very weak. Continue aggressive PT/OT  Continue supportive care    Katherine Caal, CAESAR  11/13/23  07:41 EST

## 2023-11-13 NOTE — PLAN OF CARE
Goal Outcome Evaluation:  Plan of Care Reviewed With: patient        Progress: improving  Outcome Evaluation: Patient transferred from R this afternoon. Nocturnal tube feeds ordered. Hemodialysis tomorrow. Orders called. Right hemodiaylsis cathether changed. Pigtail flushed. Reports no pain. Cortrak in place.

## 2023-11-13 NOTE — PROGRESS NOTES
[facility] Progress Note       Encounter Date:23  Patient:Dee Herrera  :1990  MRN:[id number]      Chief Complaint: Follow-up aortic dissection      Subjective:        Patient somewhat withdrawn lethargic this morning will eventually answer questions appropriately and follow commands    Review of Systems:  Review of Systems   Unable to perform ROS: Other   Patient provides limited answers to questions    Medications:  Scheduled Meds:  amLODIPine, 10 mg, Oral, Q24H  [Held by provider] aspirin, 81 mg, Oral, Daily  carvedilol, 25 mg, Oral, Q12H  chlorhexidine, 15 mL, Mouth/Throat, Q12H  First Mouthwash (Magic Mouthwash), 10 mL, Swish & Spit, Q6H  [Held by provider] heparin (porcine), 5,000 Units, Subcutaneous, Q8H  insulin regular, 2-7 Units, Subcutaneous, Q6H  Lacosamide, 50 mg, Intravenous, Q12H  levETIRAcetam, 250 mg, Intravenous, Once  levETIRAcetam, 250 mg, Intravenous, Q12H  mupirocin, , Each Nare, BID  pantoprazole, 40 mg, Intravenous, Q AM  sodium chloride, 4 mL, Nebulization, BID - RT    Continuous Infusions:  sodium chloride, 30 mL/hr, Last Rate: 30 mL/hr (23 1400)    PRN Meds:    acetaminophen **OR** acetaminophen **OR** acetaminophen    bisacodyl    bisacodyl    dextrose    dextrose    glucagon (human recombinant)    heparin (porcine)    hydrALAZINE    ipratropium-albuterol    [] Morphine **AND** naloxone    nitroglycerin    ondansetron    polyethylene glycol    Potassium Replacement - Follow Nurse / BPA Driven Protocol         Objective:       Vitals:    23 0649 23 0711 23 0712 23 0753   BP:    128/87   BP Location:    Left arm   Patient Position:    Lying   Pulse:  93 92 91   Resp:  16  16   Temp:    99.3 °F (37.4 °C)   TempSrc:    Oral   SpO2:  100% 100% 98%   Weight: 61.1 kg (134 lb 11.2 oz)      Height:               Physical Exam:  Constitutional: Chronically ill-appearing, frail, no acute distress   HENT: Oropharynx clear and  membrane moist  Eyes: Normal conjunctiva, no sclera icterus.  Neck: Supple, no carotid bruit bilaterally.  Cardiovascular: Regular rate and rhythm, Early peaking systolic murmur over the right upper sternal border, No bilateral lower extremity edema.  Pulmonary: Normal respiratory effort, normal lung sounds, no wheezing.  Abdominal: Soft, nontender, no hepatosplenomegaly, liver is non-pulsatile.  Neurological: Alert and orient x 3.  Very sleepy slow to answer questions but will eventually  Skin: Warm, dry, no ecchymosis, no rash.  Psych: Withdrawn answers few questions           Lab Review:   Results from last 7 days   Lab Units 11/13/23  0258 11/12/23  0359 11/11/23  0309 11/10/23  0311 11/09/23  0306 11/08/23  0258 11/07/23  0256 11/06/23  1754 11/06/23  1312   SODIUM mmol/L 133* 134* 136 135* 137 136 140   < > 136  135*   POTASSIUM mmol/L 3.5 3.6 3.2* 3.4* 3.5 3.6 4.0   < > 5.2  5.1   CHLORIDE mmol/L 99 99 104 104 105 103 106   < > 103  103   CO2 mmol/L 22.0 22.0 21.5* 22.0 22.2 22.0 20.0*   < > 14.0*  15.0*   BUN mg/dL 32* 21* 42* 24* 29* 18 30*   < > 26*  26*   CREATININE mg/dL 4.34* 3.16* 4.76* 3.55* 4.58* 3.24* 4.75*   < > 4.72*  4.78*   GLUCOSE mg/dL 118* 106* 122* 117* 114* 124* 89   < > 66  67   CALCIUM mg/dL 8.5* 8.3* 8.4* 8.7 8.7 8.8 8.3*   < > 10.2  10.1   AST (SGOT) U/L  --   --   --   --  21 22 23  --  22   ALT (SGPT) U/L  --   --   --   --  <5 <5 <5  --  <5    < > = values in this interval not displayed.         Results from last 7 days   Lab Units 11/12/23  0359 11/11/23  0309 11/10/23  0311 11/09/23  0306 11/08/23  0258 11/07/23  0256 11/06/23  1754   WBC 10*3/mm3 11.93* 9.87 9.25 8.95 10.72 9.25 8.73   HEMOGLOBIN g/dL 8.0* 8.5* 8.7* 8.8* 10.0* 8.9* 8.6*   HEMATOCRIT % 23.9* 25.3* 26.3* 27.0* 30.0* 26.7* 26.0*   PLATELETS 10*3/mm3 93* 86* 87* 92* 112* 88* 92*     Results from last 7 days   Lab Units 11/06/23  1754 11/06/23  1736 11/06/23  1631   INR  1.50* 1.6* 1.69*   APTT seconds 34.3  --   35.1     Results from last 7 days   Lab Units 11/11/23  0309 11/09/23  0306 11/08/23  0258 11/07/23  0256   MAGNESIUM mg/dL 1.9 2.0 1.8 1.9     Results from last 7 days   Lab Units 11/06/23  1312   TRIGLYCERIDES mg/dL 202*               Reexploration chest 11/6/2023 Dr. Lowe:  Clot removed from around heart    Echocardiogram 11/6/2023 images reviewed by myself:  Limited echo to look for pericardial effusion  Left ventricular systolic function is hyperdynamic (EF > 70%). Left ventricular ejection fraction appears to be greater than 70%.  Left ventricular wall thickness is consistent with severe concentric hypertrophy.  There is a small (<1cm) pericardial effusion with right ventricle diastolic collapse present. There is evidence of cardiac tamponade.  There appears to be collapse of the right ventricle however no free-flowing fluid.  Spoke with Dr Taylor    Aortic dissection aortic valve repair 11/3/2023 Dr. Medina:  Aortic dissection repair and proximal aortic replacement using a 26 mm Gelweave dacryon interposition graft in a valve sparing procedure configuration (Anselmo procedure (CPT code 17730)  Aortic valve repair with resuspension of the commissures.  Insertion of Shiley procedure percutaneously in the right common femoral vein    Echocardiogram 11/1/2023 images reviewed by myself:  Stat limited echocardiogram for evaluation of LV function and regional wall motion in the setting of abnormal EKG.    Ejection fraction improved from previous transthoracic echocardiogram and is estimated at 55 to 60%.  The left ventricle is severely dilated and has increased in size compared to previous transthoracic echocardiogram.    Mild septal hypokinesis with wall motion abnormality consistent with bundle branch block. Severe aortic regurgitation with dissection flap noted in the proximal ascending aorta unchanged from previous.     Echocardiogram at 10/30/2023 images reviewed by myself:  Left ventricular ejection  fraction appears to be 56 - 60%.  The left ventricular cavity is mildly dilated.  Left ventricular wall thickness is consistent with mild concentric hypertrophy.  Left ventricular diastolic function was not assessed.  Saline test results are negative.  Severe aortic valve regurgitation is present.  Moderate dilation of the aortic root is present. No dilation of the ascending aorta is present. There is an aortic root dissection    Echocardiogram 10/27/2023 images reviewed by myself:  There is a large mobile echodensity which appears to be attached to the right coronary leaflet of the aortic valve. Differential includes vegetation, thrombus, and fibroelastoma  Severe aortic valve regurgitation is present.  The left ventricular cavity is borderline dilated.  Left ventricular systolic function is mildly decreased. Left ventricular ejection fraction appears to be 46 - 50%.  Left ventricular wall thickness is consistent with moderate to severe concentric hypertrophy.  The myocardium is speckled and bright in appearance, consistent with infiltrative cardiomyopathy versus chronic kidney disease related changes.  There is grade III restrictive diastolic dysfunction (Valsalva not performed)  The left atrial cavity is moderately dilated.  There is moderate posteriorly directed mitral regurgitation  Mild to moderate tricuspid valve regurgitation is present.  Calculated right ventricular systolic pressure from tricuspid regurgitation is 45 mmHg.  The ascending aorta is mildly enlarged at 3.8 cm. The main pulmonary artery is enlarged at 2.5 cm  There is a trivial pericardial effusion.    Cardiac catheterization 6/14/2023:  Angiographically normal coronary arteries  Normal right and left-sided filling pressures           Assessment:          Diagnosis Plan   1. Shortness of breath        2. ESRD (end stage renal disease) on dialysis        3. Hyponatremia        4. Generalized abdominal pain        5. Elevated lactic acid level         6. Elevated troponin        7. Severe aortic valve regurgitation  Case request    Case request    Tissue Pathology Exam    Tissue Pathology Exam    CBC (No Diff)    CBC (No Diff)    Fibrinogen    Fibrinogen    Protime-INR    Protime-INR    aPTT    aPTT    CBC (No Diff)    CBC (No Diff)    Fibrinogen    Fibrinogen    Protime-INR    Protime-INR    aPTT    aPTT      8. Pericardial effusion  Case request    Case request    CBC (No Diff)    CBC (No Diff)    Fibrinogen    Fibrinogen    Protime-INR    Protime-INR    aPTT    aPTT             Plan:       Ms. Herrera is a 33 y.o. woman with past medical history notable for end-stage renal disease on peritoneal dialysis, lupus nephritis, systemic lupus, left ventricular perjury feet, chronic diastolic congestive heart failure, history of pericardial effusion, and pretension who presents to the hospital with worsening shortness of breath.  She was found to have severe aortic insufficiency in the setting of aortic dissection.  She eventually underwent surgery on 11/3/2023 and had her aortic dissection and aortic valve repaired.  She was subsequently taken back on 11/6 for reexploration and removal of clot from around the heart.  She has had a prolonged intubation.  But was extubated on 11/10.  Since then she has had a slow recovery.  She is a little sleepy today plans are to check a gas.      Ascending aortic aneurysm with dissection:   Status post surgery with 26 mm Catarino weave graft 11/3    Severe aortic regurgitation:  Status post surgical repair    Essential hypertension:  Blood pressures well controlled continue the current medical therapy             Nickolas Wills MD  [facility]  11/13/23  08:43 EST

## 2023-11-13 NOTE — PROGRESS NOTES
Name: Dee Herrera ADMIT: 10/26/2023   : 1990  PCP: Margarita Woods APRN    MRN: [id number] LOS: 18 days   AGE/SEX: 33 y.o. female  ROOM: 0/     Subjective   Subjective   Complaining of weakness and lethargy.  No chest pain.  No palpitation.  No shortness of breath.  No ankle edema.    Review of Systems  GI.  No abdominal pain.  No nausea or vomiting.  Last bowel movement was yesterday without fresh bright blood per rectum or melena.  .  No dysuria or hematuria.  Decreased urine output.  CNS.  No seizures.  No focal neurological symptoms.  No loss of consciousness.     Objective   Objective   Vital Signs  Temp:  [97 °F (36.1 °C)-100.3 °F (37.9 °C)] 100.3 °F (37.9 °C)  Heart Rate:  [91-98] 97  Resp:  [16] 16  BP: (128-143)/(80-98) 132/80  SpO2:  [87 %-100 %] 98 %  on   ;   Device (Oxygen Therapy): room air    Intake/Output Summary (Last 24 hours) at 2023 1818  Last data filed at 2023 1747  Gross per 24 hour   Intake 390 ml   Output 1500 ml   Net -1110 ml     Body mass index is 22.44 kg/m².      23  0600 23  0401 23  0649   Weight: 52.9 kg (116 lb 10 oz) 51.7 kg (113 lb 15.7 oz) 61.1 kg (134 lb 11.2 oz) (Verified with RN)     Physical Exam  General.  Middle-aged female.  Alert and oriented x3.  Lethargic.  No apparent pain/distress/diaphoresis.  Normal mood and affect.  Eyes.  Pupils equal round and reactive.  Intact extraocular musculature.  No pallor or jaundice  Oral cavity.  Moist mucous membrane.  Neck.  Supple.  No JVD.  No lymphadenopathy or thyromegaly.  Cardiovascular.  Regular rate and rhythm with grade 2 systolic murmur.  Healthy sternotomy scar.    Chest.  Clear to auscultation bilaterally with no added sounds.    Abdomen.  Soft lax.  No tenderness.  No organomegaly.  No guarding or rebound.  Peritoneal catheter in place.  Extremities.  No clubbing/cyanosis/edema.  CNS.  No acute focal neurological deficits.    Results Review:      Results from last 7  "days   Lab Units 11/13/23  0258 11/12/23  0359 11/11/23  0309 11/10/23  0311 11/09/23  0306 11/08/23  0258 11/07/23  0256   SODIUM mmol/L 133* 134* 136 135* 137 136 140   POTASSIUM mmol/L 3.5 3.6 3.2* 3.4* 3.5 3.6 4.0   CHLORIDE mmol/L 99 99 104 104 105 103 106   CO2 mmol/L 22.0 22.0 21.5* 22.0 22.2 22.0 20.0*   BUN mg/dL 32* 21* 42* 24* 29* 18 30*   CREATININE mg/dL 4.34* 3.16* 4.76* 3.55* 4.58* 3.24* 4.75*   GLUCOSE mg/dL 118* 106* 122* 117* 114* 124* 89   CALCIUM mg/dL 8.5* 8.3* 8.4* 8.7 8.7 8.8 8.3*   AST (SGOT) U/L  --   --   --   --  21 22 23   ALT (SGPT) U/L  --   --   --   --  <5 <5 <5     Estimated Creatinine Clearance: 18.1 mL/min (A) (by C-G formula based on SCr of 4.34 mg/dL (H)).      Results from last 7 days   Lab Units 11/13/23  1756 11/13/23  1604 11/13/23  0547 11/13/23  0015 11/12/23  1622 11/12/23  1140 11/12/23  0557 11/11/23  2313   GLUCOSE mg/dL 125 90 112 89 87 87 70 81             Results from last 7 days   Lab Units 11/13/23  0258   TSH uIU/mL 5.990*     Results from last 7 days   Lab Units 11/12/23  0359 11/11/23  1643 11/11/23  0309 11/10/23  0311 11/09/23  0306 11/08/23  0258 11/07/23  0256   MAGNESIUM mg/dL  --   --  1.9  --  2.0 1.8 1.9   PHOSPHORUS mg/dL 3.1   < > 1.4*   < > 2.8 3.1 5.2*    < > = values in this interval not displayed.           Invalid input(s): \"LDLCALC\"  Results from last 7 days   Lab Units 11/13/23  0811 11/12/23  0359 11/11/23  0309 11/10/23  0311 11/09/23  0306 11/08/23  0258 11/07/23 0256   WBC 10*3/mm3 10.66 11.93* 9.87 9.25 8.95 10.72 9.25   HEMOGLOBIN g/dL 8.0* 8.0* 8.5* 8.7* 8.8* 10.0* 8.9*   HEMATOCRIT % 23.8* 23.9* 25.3* 26.3* 27.0* 30.0* 26.7*   PLATELETS 10*3/mm3 100* 93* 86* 87* 92* 112* 88*   MCV fL 85.0 84.5 85.8 86.5 86.8 84.0 84.8   MCH pg 28.6 28.3 28.8 28.6 28.3 28.0 28.3   MCHC g/dL 33.6 33.5 33.6 33.1 32.6 33.3 33.3   RDW % 15.1 15.2 15.4 15.5* 15.9* 15.7* 15.7*   RDW-SD fl 46.3 46.6 48.7 48.8 50.7 47.8 47.3   MPV fL  --  13.3* 11.0 12.0 12.2* " 12.9* 12.9*   NEUTROPHIL % %  --   --  82.1* 83.2* 82.7* 86.9* 84.5*   LYMPHOCYTE % %  --   --  4.0* 3.7* 3.4* 3.4* 5.1*   MONOCYTES % %  --   --  11.9 10.8 11.1 7.5 8.8   EOSINOPHIL % %  --   --  0.2* 0.3 0.4 0.2* 0.2*   BASOPHIL % %  --   --  0.2 0.2 0.2 0.2 0.2   IMM GRAN % %  --   --   --   --   --  1.8*  --    NEUTROS ABS 10*3/mm3  --   --  8.11* 7.69* 7.40* 9.33* 7.82*   LYMPHS ABS 10*3/mm3  --   --  0.39* 0.34* 0.30* 0.36* 0.47*   MONOS ABS 10*3/mm3  --   --  1.17* 1.00* 0.99* 0.80 0.81   EOS ABS 10*3/mm3  --   --  0.02 0.03 0.04 0.02 0.02   BASOS ABS 10*3/mm3  --   --  0.02 0.02 0.02 0.02 0.02   IMMATURE GRANS (ABS) 10*3/mm3  --   --   --   --   --  0.19*  --    NRBC /100 WBC  --   --   --   --   --  0.0  --          Results from last 7 days   Lab Units 11/13/23  0809 11/10/23  0736 11/09/23  1443 11/08/23  1409 11/07/23  2259   PH, ARTERIAL pH units 7.527* 7.461* 7.499* 7.463* 7.471*   PO2 ART mm Hg 73.0* 166.0* 158.3* 141.5* 153.9*   PCO2, ARTERIAL mm Hg 25.8* 30.6* 29.9* 30.9* 27.2*   HCO3 ART mmol/L 21.5* 21.9* 23.3 22.1 19.9*     Results from last 7 days   Lab Units 11/11/23  0309   PROCALCITONIN ng/mL 5.05*             Results from last 7 days   Lab Units 11/12/23  1524   BLOODCX  No growth at 24 hours                   Imaging:  Imaging Results (Last 24 Hours)       Procedure Component Value Units Date/Time    XR Chest 1 View [[id number]] Collected: 11/13/23 0442     Updated: 11/13/23 0446    Narrative:      SINGLE VIEW OF THE CHEST     HISTORY: Postop open heart surgery     COMPARISON: November 11, 2023     FINDINGS:  Right internal jugular vein introducer has been removed. Weighted  enteric feeding tube is again noted. There is vascular congestion. There  is bibasilar atelectasis. Bilateral pleural effusions are noted. These  do appear to have increased, particularly on the left.       Impression:      Persistent vascular congestion and bilateral effusions. Left pleural  effusion may have increased  when compared to prior study.     This report was finalized on 11/13/2023 4:43 AM by Dr. Amparo Hodges M.D on Workstation: [id number]                  I reviewed the patient's new clinical results / labs / tests / procedures      Assessment/Plan     Active Hospital Problems    Diagnosis  POA    **Dissecting ascending aortic aneurysm [I71.010]  Yes    Aortic valve lesion [I35.8]  Yes    Severe aortic valve regurgitation [I35.1]  Yes    Hyponatremia [E87.1]  Yes    Poor appetite [R63.0]  Yes    Moderate malnutrition [E44.0]  Yes    Chronic diastolic CHF (congestive heart failure) [I50.32]  Yes    Anemia due to chronic kidney disease, on chronic dialysis [N18.6, D63.1, Z99.2]  Not Applicable    Peritoneal dialysis catheter in place [Z99.2]  Not Applicable    Essential hypertension [I10]  Yes    End stage renal disease on dialysis [N18.6, Z99.2]  Not Applicable    Seizure disorder [G40.909]  Yes    Elevated liver function tests [R79.89]  Yes    Pericardial effusion [I31.39]  Yes    Systemic lupus erythematosus [M32.9]  Yes    Thrombocytopenia [D69.6]  Yes    Hypertension secondary to other renal disorders [I15.1]  Yes    Pancytopenia [D61.818]  Yes    Vitamin D deficiency [E55.9]  Yes      Resolved Hospital Problems    Diagnosis Date Resolved POA    Abdominal pain [R10.9] 10/28/2023 Yes           Ascending aortic aneurysm dissection s/p repair (postoperative day #11) complicated by cardiac tamponade status post reexploration for bleeding after removal of a large clot compressing right ventricle (postoperative day #6)/severe aortic valve insufficiency status postrepair in a patient with a history of hypertension/chronic combined congestive heart failure with an ejection fraction of 40%.  Stable cardiac status.  No evidence of tamponade/congestive heart failure/angina.  Blood pressure under good control.  Continue Norvasc/Coreg.  Cardiology and cardiothoracic surgery are following  History of SLE.  CellCept and  Plaquenil on hold.  No synovitis.  Pancytopenia.  Mostly secondary to lupus and medications.  Stable.  Will monitor.    History of seizure disorder.  CNS examination without focal deficit.  Continue Vimpat and Keppra  VTE  prophylaxis.  Sequential compression devices.        Discussed my findings and plan of treatment with the patient/family.  Disposition.  To be determined based on clinical course        Lakisha Hunt MD  [facility]  11/13/23  18:18 EST

## 2023-11-13 NOTE — CASE MANAGEMENT/SOCIAL WORK
Continued Stay Note  [facility]     Patient Name: Dee Herrera  MRN: [id number]  Today's Date: 11/13/2023    Admit Date: 10/26/2023    Plan: BAR eval in progress   Discharge Plan       Row Name 11/13/23 1059       Plan    Plan BAR eval in progress    Plan Comments BAR consulted and eval pending.................SRS/RN CM                   Discharge Codes    No documentation.                 Expected Discharge Date and Time       Expected Discharge Date Expected Discharge Time    Nov 14, 2023               Елена Brown RN

## 2023-11-13 NOTE — PROGRESS NOTES
[facility] Progress Note      Patient Name: Dee Herrera  : 1990  MRN: [id number]  Primary Care Physician:  Margarita Woods APRN  Date of admission: 10/26/2023    Subjective     Interval History:   Follow-up end-stage renal disease, patient was on peritoneal dialysis but switched to hemodialysis in the hospital    The patient is up in the chair, asleep but arousable, no chest pain or shortness of air, no orthopnea or PND, no nausea or vomiting.        Review of Systems:   As noted above    Objective     Vitals:   Temp:  [98.7 °F (37.1 °C)-100 °F (37.8 °C)] 99.2 °F (37.3 °C)  Heart Rate:  [92-98] 92  Resp:  [16-18] 16  BP: (120-136)/() 129/91    Intake/Output Summary (Last 24 hours) at 2023 0744  Last data filed at 2023 0505  Gross per 24 hour   Intake 200 ml   Output 0 ml   Net 200 ml       Physical Exam:    General Appearance: More awake and responsive, arousable, chronically ill and frail  Skin: warm and dry  HEENT: Has NG tube in place  Neck: No JVD  Lungs: Scattered rhonchi, unlabored breathing effort  Heart: RRR, faint rub  Abdomen: soft, no guarding nondistended, normoactive bowels and PD catheter in place with clean exit site  Extremities: no edema, cyanosis or clubbing, temporary dialysis catheter in the right femoral vein  Neuro: Moving all extremities    Scheduled Meds:     amLODIPine, 10 mg, Oral, Q24H  [Held by provider] aspirin, 81 mg, Oral, Daily  carvedilol, 25 mg, Oral, Q12H  chlorhexidine, 15 mL, Mouth/Throat, Q12H  First Mouthwash (Magic Mouthwash), 10 mL, Swish & Spit, Q6H  [Held by provider] heparin (porcine), 5,000 Units, Subcutaneous, Q8H  insulin regular, 2-7 Units, Subcutaneous, Q6H  Lacosamide, 50 mg, Intravenous, Q12H  levETIRAcetam, 250 mg, Intravenous, Once  levETIRAcetam, 250 mg, Intravenous, Q12H  mupirocin, , Each Nare, BID  pantoprazole, 40 mg, Intravenous, Q AM  sodium chloride, 4 mL, Nebulization, BID - RT      IV Meds:    sodium chloride, 30 mL/hr, Last Rate: 30 mL/hr (11/02/23 1400)      Results Reviewed:   I have personally reviewed the results from the time of this admission to 11/13/2023 07:44 EST     Results from last 7 days   Lab Units 11/13/23  0258 11/12/23  0359 11/11/23  0309 11/10/23  0311 11/09/23  0306 11/08/23  0258 11/07/23  0256   SODIUM mmol/L 133* 134* 136   < > 137 136 140   POTASSIUM mmol/L 3.5 3.6 3.2*   < > 3.5 3.6 4.0   CHLORIDE mmol/L 99 99 104   < > 105 103 106   CO2 mmol/L 22.0 22.0 21.5*   < > 22.2 22.0 20.0*   BUN mg/dL 32* 21* 42*   < > 29* 18 30*   CREATININE mg/dL 4.34* 3.16* 4.76*   < > 4.58* 3.24* 4.75*   CALCIUM mg/dL 8.5* 8.3* 8.4*   < > 8.7 8.8 8.3*   BILIRUBIN mg/dL  --   --   --   --  0.3 0.4 0.3   ALK PHOS U/L  --   --   --   --  67 64 50   ALT (SGPT) U/L  --   --   --   --  <5 <5 <5   AST (SGOT) U/L  --   --   --   --  21 22 23   GLUCOSE mg/dL 118* 106* 122*   < > 114* 124* 89    < > = values in this interval not displayed.       Estimated Creatinine Clearance: 18.1 mL/min (A) (by C-G formula based on SCr of 4.34 mg/dL (H)).    Results from last 7 days   Lab Units 11/12/23  0359 11/11/23  1643 11/11/23  0309 11/10/23  0311 11/09/23  0306 11/08/23  0258   MAGNESIUM mg/dL  --   --  1.9  --  2.0 1.8   PHOSPHORUS mg/dL 3.1 0.6* 1.4*   < > 2.8 3.1    < > = values in this interval not displayed.             Results from last 7 days   Lab Units 11/12/23  0359 11/11/23  0309 11/10/23  0311 11/09/23  0306 11/08/23  0258   WBC 10*3/mm3 11.93* 9.87 9.25 8.95 10.72   HEMOGLOBIN g/dL 8.0* 8.5* 8.7* 8.8* 10.0*   PLATELETS 10*3/mm3 93* 86* 87* 92* 112*       Results from last 7 days   Lab Units 11/06/23  1754 11/06/23  1736 11/06/23  1631   INR  1.50* 1.6* 1.69*       Assessment / Plan     ASSESSMENT:  End-stage renal disease secondary to lupus nephritis who was on peritoneal dialysis, she was switched since her surgery to hemodialysis until she is up and moving then we will switch her back to PD.   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  She then developed cardiac tamponade and had reexploration done with removal of large clot compressing the right ventricle  Seizure disorder  Hypertension with ESRD, stable    PLAN:  Continue the same treatment  Hemodialysis today change to 4K bath and 137 sodium bath  Will not give iron nor YULIANA since it will be ineffective in the presence of inflammation  Surveillance labs    I reviewed the chart and other providers note, I reviewed imaging and lab data.  Copied text in this note has been reviewed and is accurate as of 11/13/23.     Thank you for involving us in the care of Dee Herrera.  Please feel free to call with any questions.    Isaac Diaz MD  11/13/23  07:44 [facility]    [facility]  [phone]    Please note that portions of this note were completed with a voice recognition program.

## 2023-11-14 ENCOUNTER — APPOINTMENT (OUTPATIENT)
Dept: NEUROLOGY | Facility: HOSPITAL | Age: 33
DRG: 219 | End: 2023-11-14
Payer: MEDICARE

## 2023-11-14 ENCOUNTER — APPOINTMENT (OUTPATIENT)
Dept: GENERAL RADIOLOGY | Facility: HOSPITAL | Age: 33
DRG: 219 | End: 2023-11-14
Payer: MEDICARE

## 2023-11-14 LAB
ALBUMIN SERPL-MCNC: 2.9 G/DL (ref 3.5–5.2)
ALP SERPL-CCNC: 73 U/L (ref 39–117)
ALT SERPL W P-5'-P-CCNC: 11 U/L (ref 1–33)
AMMONIA BLD-SCNC: 21 UMOL/L (ref 11–51)
ANION GAP SERPL CALCULATED.3IONS-SCNC: 10 MMOL/L (ref 5–15)
AST SERPL-CCNC: 28 U/L (ref 1–32)
BASOPHILS # BLD AUTO: 0.01 10*3/MM3 (ref 0–0.2)
BASOPHILS NFR BLD AUTO: 0.1 % (ref 0–1.5)
BILIRUB CONJ SERPL-MCNC: <0.2 MG/DL (ref 0–0.3)
BILIRUB INDIRECT SERPL-MCNC: ABNORMAL MG/DL
BILIRUB SERPL-MCNC: 0.4 MG/DL (ref 0–1.2)
BUN SERPL-MCNC: 21 MG/DL (ref 6–20)
BUN/CREAT SERPL: 6.3 (ref 7–25)
CALCIUM SPEC-SCNC: 8.9 MG/DL (ref 8.6–10.5)
CHLORIDE SERPL-SCNC: 100 MMOL/L (ref 98–107)
CO2 SERPL-SCNC: 24 MMOL/L (ref 22–29)
CREAT SERPL-MCNC: 3.31 MG/DL (ref 0.57–1)
DEPRECATED RDW RBC AUTO: 47.8 FL (ref 37–54)
EGFRCR SERPLBLD CKD-EPI 2021: 18.4 ML/MIN/1.73
EOSINOPHIL # BLD AUTO: 0.01 10*3/MM3 (ref 0–0.4)
EOSINOPHIL NFR BLD AUTO: 0.1 % (ref 0.3–6.2)
ERYTHROCYTE [DISTWIDTH] IN BLOOD BY AUTOMATED COUNT: 14.8 % (ref 12.3–15.4)
GLUCOSE BLDC GLUCOMTR-MCNC: 109 MG/DL (ref 70–130)
GLUCOSE BLDC GLUCOMTR-MCNC: 96 MG/DL (ref 70–130)
GLUCOSE SERPL-MCNC: 124 MG/DL (ref 65–99)
HCT VFR BLD AUTO: 24.2 % (ref 34–46.6)
HGB BLD-MCNC: 7.8 G/DL (ref 12–15.9)
LYMPHOCYTES # BLD AUTO: 0.37 10*3/MM3 (ref 0.7–3.1)
LYMPHOCYTES NFR BLD AUTO: 4 % (ref 19.6–45.3)
MAGNESIUM SERPL-MCNC: 1.7 MG/DL (ref 1.6–2.6)
MCH RBC QN AUTO: 28.2 PG (ref 26.6–33)
MCHC RBC AUTO-ENTMCNC: 32.2 G/DL (ref 31.5–35.7)
MCV RBC AUTO: 87.4 FL (ref 79–97)
MONOCYTES # BLD AUTO: 0.98 10*3/MM3 (ref 0.1–0.9)
MONOCYTES NFR BLD AUTO: 10.5 % (ref 5–12)
NEUTROPHILS NFR BLD AUTO: 7.81 10*3/MM3 (ref 1.7–7)
NEUTROPHILS NFR BLD AUTO: 83.9 % (ref 42.7–76)
PHOSPHATE SERPL-MCNC: 2 MG/DL (ref 2.5–4.5)
PLATELET # BLD AUTO: 120 10*3/MM3 (ref 140–450)
POTASSIUM SERPL-SCNC: 3.7 MMOL/L (ref 3.5–5.2)
PROT SERPL-MCNC: 5.8 G/DL (ref 6–8.5)
RBC # BLD AUTO: 2.77 10*6/MM3 (ref 3.77–5.28)
SODIUM SERPL-SCNC: 134 MMOL/L (ref 136–145)
VIT B12 BLD-MCNC: 1062 PG/ML (ref 211–946)
WBC NRBC COR # BLD: 9.31 10*3/MM3 (ref 3.4–10.8)

## 2023-11-14 PROCEDURE — 36430 TRANSFUSION BLD/BLD COMPNT: CPT

## 2023-11-14 PROCEDURE — 82948 REAGENT STRIP/BLOOD GLUCOSE: CPT

## 2023-11-14 PROCEDURE — 86900 BLOOD TYPING SEROLOGIC ABO: CPT

## 2023-11-14 PROCEDURE — 25010000002 THIAMINE HCL 200 MG/2ML SOLUTION: Performed by: PSYCHIATRY & NEUROLOGY

## 2023-11-14 PROCEDURE — 82607 VITAMIN B-12: CPT | Performed by: PSYCHIATRY & NEUROLOGY

## 2023-11-14 PROCEDURE — 25010000002 LACOSAMIDE 200 MG/20ML SOLUTION

## 2023-11-14 PROCEDURE — 90791 PSYCH DIAGNOSTIC EVALUATION: CPT

## 2023-11-14 PROCEDURE — 99232 SBSQ HOSP IP/OBS MODERATE 35: CPT | Performed by: INTERNAL MEDICINE

## 2023-11-14 PROCEDURE — P9016 RBC LEUKOCYTES REDUCED: HCPCS

## 2023-11-14 PROCEDURE — 80076 HEPATIC FUNCTION PANEL: CPT | Performed by: INTERNAL MEDICINE

## 2023-11-14 PROCEDURE — 25010000002 THIAMINE PER 100 MG: Performed by: PSYCHIATRY & NEUROLOGY

## 2023-11-14 PROCEDURE — 84100 ASSAY OF PHOSPHORUS: CPT | Performed by: INTERNAL MEDICINE

## 2023-11-14 PROCEDURE — C9254 INJECTION, LACOSAMIDE: HCPCS

## 2023-11-14 PROCEDURE — 85025 COMPLETE CBC W/AUTO DIFF WBC: CPT | Performed by: INTERNAL MEDICINE

## 2023-11-14 PROCEDURE — 99024 POSTOP FOLLOW-UP VISIT: CPT | Performed by: THORACIC SURGERY (CARDIOTHORACIC VASCULAR SURGERY)

## 2023-11-14 PROCEDURE — 95816 EEG AWAKE AND DROWSY: CPT | Performed by: PSYCHIATRY & NEUROLOGY

## 2023-11-14 PROCEDURE — 99233 SBSQ HOSP IP/OBS HIGH 50: CPT | Performed by: NURSE PRACTITIONER

## 2023-11-14 PROCEDURE — 71045 X-RAY EXAM CHEST 1 VIEW: CPT

## 2023-11-14 PROCEDURE — 95819 EEG AWAKE AND ASLEEP: CPT

## 2023-11-14 PROCEDURE — 25010000002 LEVETRIRACETAM PER 10 MG: Performed by: INTERNAL MEDICINE

## 2023-11-14 PROCEDURE — 82140 ASSAY OF AMMONIA: CPT | Performed by: PSYCHIATRY & NEUROLOGY

## 2023-11-14 PROCEDURE — 80048 BASIC METABOLIC PNL TOTAL CA: CPT

## 2023-11-14 PROCEDURE — 83735 ASSAY OF MAGNESIUM: CPT | Performed by: INTERNAL MEDICINE

## 2023-11-14 RX ORDER — SODIUM CHLORIDE, SODIUM LACTATE, CALCIUM CHLORIDE, MAGNESIUM CHLORIDE AND DEXTROSE 1.5; 538; 448; 18.3; 5.08 G/100ML; MG/100ML; MG/100ML; MG/100ML; MG/100ML
2000 INJECTION, SOLUTION INTRAPERITONEAL
Status: DISCONTINUED | OUTPATIENT
Start: 2023-11-14 | End: 2023-11-15

## 2023-11-14 RX ORDER — LOSARTAN POTASSIUM 25 MG/1
25 TABLET ORAL
Status: DISCONTINUED | OUTPATIENT
Start: 2023-11-14 | End: 2023-11-18

## 2023-11-14 RX ORDER — ESCITALOPRAM OXALATE 10 MG/1
10 TABLET ORAL DAILY
Status: DISCONTINUED | OUTPATIENT
Start: 2023-11-14 | End: 2023-11-22

## 2023-11-14 RX ADMIN — THIAMINE HYDROCHLORIDE 200 MG: 100 INJECTION, SOLUTION INTRAMUSCULAR; INTRAVENOUS at 10:25

## 2023-11-14 RX ADMIN — LEVETIRACETAM 250 MG: 500 INJECTION, SOLUTION INTRAVENOUS at 10:24

## 2023-11-14 RX ADMIN — THIAMINE HYDROCHLORIDE 200 MG: 100 INJECTION, SOLUTION INTRAMUSCULAR; INTRAVENOUS at 01:07

## 2023-11-14 RX ADMIN — SODIUM CHLORIDE, SODIUM LACTATE, CALCIUM CHLORIDE, MAGNESIUM CHLORIDE AND DEXTROSE 2000 ML: 1.5; 538; 448; 18.3; 5.08 INJECTION, SOLUTION INTRAPERITONEAL at 14:05

## 2023-11-14 RX ADMIN — LACOSAMIDE 50 MG: 10 INJECTION INTRAVENOUS at 11:28

## 2023-11-14 RX ADMIN — LEVETIRACETAM 250 MG: 500 INJECTION, SOLUTION INTRAVENOUS at 22:04

## 2023-11-14 RX ADMIN — LACOSAMIDE 50 MG: 10 INJECTION INTRAVENOUS at 01:08

## 2023-11-14 RX ADMIN — AMLODIPINE BESYLATE 10 MG: 10 TABLET ORAL at 10:24

## 2023-11-14 RX ADMIN — LOSARTAN POTASSIUM 25 MG: 25 TABLET, FILM COATED ORAL at 18:45

## 2023-11-14 RX ADMIN — DIPHENHYDRAMINE HYDROCHLORIDE AND LIDOCAINE HYDROCHLORIDE AND ALUMINUM HYDROXIDE AND MAGNESIUM HYDRO 10 ML: KIT at 22:04

## 2023-11-14 RX ADMIN — CARVEDILOL 25 MG: 25 TABLET, FILM COATED ORAL at 22:03

## 2023-11-14 RX ADMIN — PANTOPRAZOLE SODIUM 40 MG: 40 INJECTION, POWDER, FOR SOLUTION INTRAVENOUS at 06:38

## 2023-11-14 RX ADMIN — ESCITALOPRAM OXALATE 10 MG: 10 TABLET, FILM COATED ORAL at 18:45

## 2023-11-14 RX ADMIN — CARVEDILOL 25 MG: 25 TABLET, FILM COATED ORAL at 10:24

## 2023-11-14 RX ADMIN — SODIUM CHLORIDE, SODIUM LACTATE, CALCIUM CHLORIDE, MAGNESIUM CHLORIDE AND DEXTROSE 2000 ML: 1.5; 538; 448; 18.3; 5.08 INJECTION, SOLUTION INTRAPERITONEAL at 22:55

## 2023-11-14 NOTE — CASE MANAGEMENT/SOCIAL WORK
Continued Stay Note  [facility]     Patient Name: Dee Herrera  MRN: [id number]  Today's Date: 11/14/2023    Admit Date: 10/26/2023    Plan: Working on finding a SNF that accepts peritoneal dialysis   Discharge Plan       Row Name 11/14/23 1219       Plan    Plan Working on finding a SNF that accepts peritoneal dialysis    Plan Comments BAR is not an option because the do not accept Pt who are on Peritoneal dialysis.  CCP working on getting a list of SNF's for Pt that does PD.  CCP following............SRS/RNCM                   Discharge Codes    No documentation.                 Expected Discharge Date and Time       Expected Discharge Date Expected Discharge Time    Nov 17, 2023               Елена Brown RN

## 2023-11-14 NOTE — PROGRESS NOTES
Name: Dee Herrera ADMIT: 10/26/2023   : 1990  PCP: Margarita Woods APRN    MRN: [id number] LOS: 19 days   AGE/SEX: 33 y.o. female  ROOM: 0/     Subjective   Subjective   Continues with weakness and lethargy.  No chest pain.  No palpitation.  No shortness of breath.  No ankle edema.    Review of Systems  GI.  No abdominal pain.  No nausea or vomiting.  Reports normal bowel movements without fresh bright blood per rectum or melena.  .  No dysuria or hematuria.  Minimal urine output.  CNS.  No seizures.  No focal neurological symptoms.  No loss of consciousness.     Objective   Objective   Vital Signs  Temp:  [97.9 °F (36.6 °C)-100.9 °F (38.3 °C)] 99.2 °F (37.3 °C)  Heart Rate:  [91-99] 91  Resp:  [16] 16  BP: (116-138)/(77-98) 138/98  SpO2:  [91 %-98 %] 91 %  on   ;   Device (Oxygen Therapy): room air    Intake/Output Summary (Last 24 hours) at 2023 1652  Last data filed at 2023 1000  Gross per 24 hour   Intake 1595 ml   Output 0 ml   Net 1595 ml     Body mass index is 22.92 kg/m².      23  0401 23  0649 23  0616   Weight: 51.7 kg (113 lb 15.7 oz) 61.1 kg (134 lb 11.2 oz) (Verified with RN) 62.4 kg (137 lb 9.1 oz)     Physical Exam  General.  Middle-aged female.  Alert and oriented x3.  Lethargic.  No apparent pain/distress/diaphoresis.  Appears depressed (I agree with cardiology)   eyes.  Pupils equal round and reactive.  Intact extraocular musculature.  No pallor or jaundice  Oral cavity.  Moist mucous membrane.  Neck.  Supple.  No JVD.  No lymphadenopathy or thyromegaly.  Cardiovascular.  Regular rate and rhythm with grade 2 systolic murmur.  Healthy sternotomy scar.    Chest.  Clear to auscultation bilaterally with no added sounds.    Abdomen.  Soft lax.  No tenderness.  No organomegaly.  No guarding or rebound.  Peritoneal catheter in place.  Extremities.  No clubbing/cyanosis/edema.  CNS.  No acute focal neurological deficits.    Results Review:      Results  "from last 7 days   Lab Units 11/14/23  0308 11/13/23  0258 11/12/23  0359 11/11/23  0309 11/10/23  0311 11/09/23  0306 11/08/23  0258   SODIUM mmol/L 134* 133* 134* 136 135* 137 136   POTASSIUM mmol/L 3.7 3.5 3.6 3.2* 3.4* 3.5 3.6   CHLORIDE mmol/L 100 99 99 104 104 105 103   CO2 mmol/L 24.0 22.0 22.0 21.5* 22.0 22.2 22.0   BUN mg/dL 21* 32* 21* 42* 24* 29* 18   CREATININE mg/dL 3.31* 4.34* 3.16* 4.76* 3.55* 4.58* 3.24*   GLUCOSE mg/dL 124* 118* 106* 122* 117* 114* 124*   CALCIUM mg/dL 8.9 8.5* 8.3* 8.4* 8.7 8.7 8.8   AST (SGOT) U/L 28  --   --   --   --  21 22   ALT (SGPT) U/L 11  --   --   --   --  <5 <5     Estimated Creatinine Clearance: 24.3 mL/min (A) (by C-G formula based on SCr of 3.31 mg/dL (H)).      Results from last 7 days   Lab Units 11/14/23  1602 11/14/23  1157 11/13/23  2354 11/13/23  1756 11/13/23  1604 11/13/23  0547 11/13/23  0015 11/12/23  1622   GLUCOSE mg/dL 96 109 116 125 90 112 89 87             Results from last 7 days   Lab Units 11/13/23  0258   TSH uIU/mL 5.990*     Results from last 7 days   Lab Units 11/14/23  0308 11/11/23  1643 11/11/23  0309 11/10/23  0311 11/09/23  0306 11/08/23  0258   MAGNESIUM mg/dL 1.7  --  1.9  --  2.0 1.8   PHOSPHORUS mg/dL 2.0*   < > 1.4*   < > 2.8 3.1    < > = values in this interval not displayed.           Invalid input(s): \"LDLCALC\"  Results from last 7 days   Lab Units 11/14/23  0308 11/13/23  0811 11/12/23  0359 11/11/23  0309 11/10/23  0311 11/09/23  0306 11/08/23  0258   WBC 10*3/mm3 9.31 10.66 11.93* 9.87 9.25 8.95 10.72   HEMOGLOBIN g/dL 7.8* 8.0* 8.0* 8.5* 8.7* 8.8* 10.0*   HEMATOCRIT % 24.2* 23.8* 23.9* 25.3* 26.3* 27.0* 30.0*   PLATELETS 10*3/mm3 120* 100* 93* 86* 87* 92* 112*   MCV fL 87.4 85.0 84.5 85.8 86.5 86.8 84.0   MCH pg 28.2 28.6 28.3 28.8 28.6 28.3 28.0   MCHC g/dL 32.2 33.6 33.5 33.6 33.1 32.6 33.3   RDW % 14.8 15.1 15.2 15.4 15.5* 15.9* 15.7*   RDW-SD fl 47.8 46.3 46.6 48.7 48.8 50.7 47.8   MPV fL  --   --  13.3* 11.0 12.0 12.2* 12.9* "   NEUTROPHIL % % 83.9*  --   --  82.1* 83.2* 82.7* 86.9*   LYMPHOCYTE % % 4.0*  --   --  4.0* 3.7* 3.4* 3.4*   MONOCYTES % % 10.5  --   --  11.9 10.8 11.1 7.5   EOSINOPHIL % % 0.1*  --   --  0.2* 0.3 0.4 0.2*   BASOPHIL % % 0.1  --   --  0.2 0.2 0.2 0.2   IMM GRAN % %  --   --   --   --   --   --  1.8*   NEUTROS ABS 10*3/mm3 7.81*  --   --  8.11* 7.69* 7.40* 9.33*   LYMPHS ABS 10*3/mm3 0.37*  --   --  0.39* 0.34* 0.30* 0.36*   MONOS ABS 10*3/mm3 0.98*  --   --  1.17* 1.00* 0.99* 0.80   EOS ABS 10*3/mm3 0.01  --   --  0.02 0.03 0.04 0.02   BASOS ABS 10*3/mm3 0.01  --   --  0.02 0.02 0.02 0.02   IMMATURE GRANS (ABS) 10*3/mm3  --   --   --   --   --   --  0.19*   NRBC /100 WBC  --   --   --   --   --   --  0.0         Results from last 7 days   Lab Units 11/13/23  0809 11/10/23  0736 11/09/23  1443 11/08/23  1409 11/07/23  4389   PH, ARTERIAL pH units 7.527* 7.461* 7.499* 7.463* 7.471*   PO2 ART mm Hg 73.0* 166.0* 158.3* 141.5* 153.9*   PCO2, ARTERIAL mm Hg 25.8* 30.6* 29.9* 30.9* 27.2*   HCO3 ART mmol/L 21.5* 21.9* 23.3 22.1 19.9*     Results from last 7 days   Lab Units 11/11/23  0309   PROCALCITONIN ng/mL 5.05*         Results from last 7 days   Lab Units 11/14/23  0308   AMMONIA umol/L 21     Results from last 7 days   Lab Units 11/12/23  1924 11/12/23  1524   BLOODCX  No growth at 24 hours No growth at 2 days                   Imaging:  Imaging Results (Last 24 Hours)       Procedure Component Value Units Date/Time    XR Chest 1 View [[id number]] Collected: 11/14/23 0511     Updated: 11/14/23 0515    Narrative:      SINGLE VIEW OF THE CHEST     HISTORY: Postop open heart surgery     COMPARISON: November 13, 2023     FINDINGS:  Weighted enteric feeding tube extends into the upper abdomen.  Cardiomegaly and vascular congestion are noted. No pneumothorax is seen.  There are bilateral effusions. There is bibasilar atelectasis.       Impression:      No significant interval change.     This report was finalized on  11/14/2023 5:11 AM by Dr. Amparo Hodges M.D on Workstation: [id number]                  I reviewed the patient's new clinical results / labs / tests / procedures      Assessment/Plan     Active Hospital Problems    Diagnosis  POA    **Dissecting ascending aortic aneurysm [I71.010]  Yes    Aortic valve lesion [I35.8]  Yes    Severe aortic valve regurgitation [I35.1]  Yes    Hyponatremia [E87.1]  Yes    Poor appetite [R63.0]  Yes    Moderate malnutrition [E44.0]  Yes    Chronic diastolic CHF (congestive heart failure) [I50.32]  Yes    Anemia due to chronic kidney disease, on chronic dialysis [N18.6, D63.1, Z99.2]  Not Applicable    Peritoneal dialysis catheter in place [Z99.2]  Not Applicable    Essential hypertension [I10]  Yes    End stage renal disease on dialysis [N18.6, Z99.2]  Not Applicable    Seizure disorder [G40.909]  Yes    Elevated liver function tests [R79.89]  Yes    Pericardial effusion [I31.39]  Yes    Systemic lupus erythematosus [M32.9]  Yes    Thrombocytopenia [D69.6]  Yes    Hypertension secondary to other renal disorders [I15.1]  Yes    Pancytopenia [D61.818]  Yes    Vitamin D deficiency [E55.9]  Yes      Resolved Hospital Problems    Diagnosis Date Resolved POA    Abdominal pain [R10.9] 10/28/2023 Yes           Ascending aortic aneurysm dissection s/p repair (postoperative day #12) complicated by cardiac tamponade status post reexploration for bleeding after removal of a large clot compressing right ventricle (postoperative day #7)/severe aortic valve insufficiency status postrepair in a patient with a history of hypertension/chronic combined congestive heart failure with an ejection fraction of 40%.  Stable cardiac status.  No evidence of tamponade/congestive heart failure/angina.  Blood pressure under good control.  Continue Norvasc/Coreg.  Add low-dose losartan cardiology and cardiothoracic surgery are following  Feeding and p.o. intake.  Continues with poor appetite and poor p.o.  intake.  On nutritional supplement.  Tolerating soft mechanical diet and thin liquid well.  Currently on cyclic Dobbhoff tube feeds.  History of SLE.  CellCept and Plaquenil on hold.  No synovitis.  Anemia and thrombocytopenia.  No active bleed.  Anemia is multifactorial and is secondary to medication and postoperative blood loss.  Thrombocytopenia has improved.  Thrombocytopenia mostly secondary to medications.  Continue observation.  Transfuse to keep hemoglobin more than 8.    End-stage renal disease.  Euvolemic.  Nephrology planning resumption of peritoneal dialysis (was on hemodialysis during the perioperative period).  History of seizure disorder/depression.  CNS examination without focal deficit.  No seizures.  Continue Vimpat and Keppra.  The neurology planning MRI of the brain and EEG.  Will initiate Lexapro  VTE  prophylaxis.  Sequential compression devices.        Discussed my findings and plan of treatment with the patient/family.  Disposition.  To be determined based on clinical course.  Eventually to skilled facility.        Lakisha Hunt MD  [facility]  11/14/23  16:52 EST

## 2023-11-14 NOTE — PROGRESS NOTES
Nutrition Services    Patient Name:  Dee Herrera  YOB: 1990  MRN: [id number]  Admit Date:  10/26/2023    Assessment Date:  11/14/23    CLINICAL NUTRITION - PROGRESS NOTE       Reason for Encounter Follow-up         Nutrition Order Diet: Regular/House Diet; Feeding Assistance - Nursing; Texture: Soft to Chew (NDD 3); Soft to Chew: Chopped Meat; Fluid Consistency: Thin (IDDSI 0)    Supplements/Snacks N/a    EN/PN Order Novasource Renal at 35 mL/hr from 8p-8a         Pertinent Information No calorie count envelope was brought up from kitchen yesterday.        Intervention/Plan Spoke with RN who reports patient with minimal PO intake.  This RD familiar with patient from prior to surgery and patient was also eating poorly at that time, believe this is baseline for her.    Delivered calorie count envelope to RN.  RD to follow up tomorrow for day 1 results.     RD to follow up per protocol.    Electronically signed by:  Shell Horn RD  11/14/23 16:51 EST

## 2023-11-14 NOTE — CONSULTS
"Patient seen by [facility] d/t depression. Patient interviewed alone in room 2220 (CVU). Introduced self and role. Patient agreed to be evaluated. The patient is A/OX3. The patient did not know why she was in the hospital. The patient is flat and provides basically no eye contact. The patient does not provide much detail, has minimal responses and mostly shakes her head to respond to questions. The patient was sitting on edge of bed looking down during majority of evaluation.    The patient is a 33 y.o. single female. The patient stated she lives in an apartment but when asked who she lives with the patient answered \"I can't remember.\"   Buddhist/Spiritual: No  Children: No  Occupation: [employer] for 5 years.  Hobbies: None  Education: Highschool  Legal: Denies  : No  Support System: Mom  Hx of Violence: Denies  Hx of Abuse/Trauma: Denies  Feel Safe at Home: Yes    The patient presented to the ED on 10/26/23 d/t shortness of air over the past couple of days and had no relief from her inhalers. The patient has a history of asthma, lupus, and ESRD and peritoneal dialysis. The patient was found to be hyponatremic. The patient underwent a AAA repair on 11/2 and a clot removal on 11/6 after a reexploration d/t bleeding. The patient's cardiologist noted the patient seemed more depressed and flat today and is worried about patient not participating in recovery. Of note the patient did refuse OT today.    The patient provided no mental health history. When asked about mental health history the patient just shook her head \"no.\"  The patient denied she was on any mental health medications now or previously.     The patient denied depression, anxiety, SI, wish to be dead, HI, or hallucinations. The patient reported good sleep and o.k. appetite.     The patient denied any substance use.     The patient continued to deny depression throughout evaluation despite showing signs consistent with depression. The patient was " offered a psychiatrist consult which she refused. [facility] will follow.

## 2023-11-14 NOTE — SIGNIFICANT NOTE
11/14/23 1137   OTHER   Discipline occupational therapist   Rehab Time/Intention   Session Not Performed other (see comments)  (She declined OT this AM - will f/u as time allows and pt available (EEG currently).)   Recommendation   OT - Next Appointment 11/15/23

## 2023-11-14 NOTE — PROGRESS NOTES
"DOS: 2023  NAME: Dee Herrera   : 1990  PCP: Margarita Woods APRN  Chief Complaint   Patient presents with    Shortness of Breath     Neurology    Subjective: More alert today. Sitting in chair. Reports stomach discomfort. Pt seen in follow up today, however the problem is new to the examiner.      Objective:  Vital signs: /84   Pulse 96   Temp 99.3 °F (37.4 °C) (Oral)   Resp 16   Ht 165 cm (64.96\")   Wt 62.4 kg (137 lb 9.1 oz)   LMP 08/10/2022 (Approximate)   SpO2 98%   BMI 22.92 kg/m²       General appearance: Well developed, well nourished. Flat affect.    HEENT: Normocephalic.   Cardiac: Regular rate and rhythm.   Peripheral Vasculature: Radial pulses are equal and symmetric.  Chest Exam: Clear to auscultation bilaterally, no wheezes, no rhonchi.  Extremities: Normal, no edema.   Skin: No rashes or birthmarks.     Higher integrative function: Awake/alert. Oriented to month/year, and location but no situation.  Follows simple commands.  Cranial nerves: Visual fields grossly intact, extraocular movements intact, PERRL.  Normal facial sensation, face symmetric.  Tongue midline.  Motor:Moving extremities symmetrically, at least 4/5. No abnormal movements.   Sensation: Intact to LT in all extremities.   Station and gait: Not assessed.     Scheduled Meds:amLODIPine, 10 mg, Oral, Q24H  [Held by provider] aspirin, 81 mg, Oral, Daily  carvedilol, 25 mg, Oral, Q12H  chlorhexidine, 15 mL, Mouth/Throat, Q12H  First Mouthwash (Magic Mouthwash), 10 mL, Swish & Spit, Q6H  [Held by provider] heparin (porcine), 5,000 Units, Subcutaneous, Q8H  insulin regular, 2-7 Units, Subcutaneous, Q6H  Lacosamide, 50 mg, Intravenous, Q12H  levETIRAcetam, 250 mg, Intravenous, Q12H  mupirocin, , Each Nare, BID  pantoprazole, 40 mg, Intravenous, Q AM  UltraBag/Dianeal/1.5% Dextrose low-elen (lucia #[id number]), 2,000 mL, Intraperitoneal, 4 Exchanges Daily - Dwell Overnight      Continuous Infusions:sodium chloride, " 30 mL/hr, Last Rate: 30 mL/hr (23 1400)      PRN Meds:.  acetaminophen **OR** acetaminophen **OR** acetaminophen    bisacodyl    bisacodyl    dextrose    dextrose    glucagon (human recombinant)    heparin (porcine)    hydrALAZINE    ipratropium-albuterol    [] Morphine **AND** naloxone    nitroglycerin    ondansetron    polyethylene glycol    Potassium Replacement - Follow Nurse / BPA Driven Protocol         Laboratory results:  Lab Results   Component Value Date    GLUCOSE 124 (H) 2023    CALCIUM 8.9 2023     (L) 2023    K 3.7 2023    CO2 24.0 2023     2023    BUN 21 (H) 2023    CREATININE 3.31 (H) 2023    EGFRIFAFRI 107 2021    BCR 6.3 (L) 2023    ANIONGAP 10.0 2023     Lab Results   Component Value Date    WBC 9.31 2023    HGB 7.8 (L) 2023    HCT 24.2 (L) 2023    MCV 87.4 2023     (L) 2023     Lab Results   Component Value Date    CHOL 69 2021     Lab Results   Component Value Date    HDL 67 2022    HDL 30 (L) 2021     Lab Results   Component Value Date    LDL 44 2022    LDL 25 2021     Lab Results   Component Value Date    TRIG 202 (H) 2023    TRIG 48 2022    TRIG 57 2021          Review and interpretation of imaging:  XR Chest 1 View    Result Date: 2023  SINGLE VIEW OF THE CHEST  HISTORY: Postop open heart surgery  COMPARISON: 2023  FINDINGS: Weighted enteric feeding tube extends into the upper abdomen. Cardiomegaly and vascular congestion are noted. No pneumothorax is seen. There are bilateral effusions. There is bibasilar atelectasis.      No significant interval change.  This report was finalized on 2023 5:11 AM by Dr. Amparo Hodges M.D on Workstation: [id number]      XR Chest 1 View    Result Date: 2023  SINGLE VIEW OF THE CHEST  HISTORY: Postop open heart surgery  COMPARISON: 2023   FINDINGS: Right internal jugular vein introducer has been removed. Weighted enteric feeding tube is again noted. There is vascular congestion. There is bibasilar atelectasis. Bilateral pleural effusions are noted. These do appear to have increased, particularly on the left.      Persistent vascular congestion and bilateral effusions. Left pleural effusion may have increased when compared to prior study.  This report was finalized on 11/13/2023 4:43 AM by Dr. Amparo Hodges M.D on Workstation: [id number]      XR Chest 1 View    Result Date: 11/11/2023  SINGLE VIEW OF THE CHEST  HISTORY: Postop open heart surgery  COMPARISON: November 10, 2023  FINDINGS: Endotracheal tube has been removed. Remaining tubes and lines are stable. There is cardiomegaly. There is vascular congestion. There is bibasilar atelectasis. There are bilateral effusions. No pneumothorax is seen.      Persistent vascular congestion and bibasilar atelectasis.  This report was finalized on 11/11/2023 5:38 AM by Dr. Amparo Hodges M.D on Workstation: [id number]         Impression:  33-year-old female with hypertension, lupus, history of migraine, h/o seizure (previously seen by Dr Schmid with [facility], tapered off AEDs), and ESRD on PD prior to arrival who was admitted with 10/26 with shortness of breath and she ultimately underwent cardiothoracic surgery for aortic dissection.  Postoperatively she was seen by Dr. Guillen for seizures after she had several complex partial seizures with secondary generalization.  Continuous EEG showed sharp waves but no electrographic seizures.  Keppra and Vimpat were started initially on higher doses but then were changed to renal doses.  Neurology was reconsulted 11/13  due to persistent lethargy and generalized weakness.  Family reports this has been persistent since surgery but denied any clinical seizures since last seen by neurology.    W/U:   CT head 11/4: Stable 2 mm right-sided subdural hematoma, Near  resolution of CSF density subdural hygroma over the right frontal lobe  Labs: B12 1062, ammonia level 21, TSH 5.9    Diagnoses:  Encephalopathy, improving   ESRD  SLE   Epilepsy, focal onset with complex partial seizures  Status post aortic dissection repair    Plan:  Follow-up EEG  Follow-up MRI brain, likely tomorrow at the earliest as she still has pacing wires  Continue Vimpat 50 mg twice daily and Keppra 250 mg twice daily with an additional dose after dialysis  Status post treatment with high-dose thiamine 200 mg IV every 8 hours x3  Defer further treatment of mildly elevated TSH to primary team if indicated  D/W Dr Gamino today. Will follow.

## 2023-11-14 NOTE — PROGRESS NOTES
[facility] Progress Note      Patient Name: Dee Herrera  : 1990  MRN: [id number]  Primary Care Physician:  Margarita Woods APRN  Date of admission: 10/26/2023    Subjective     Interval History:   Follow-up end-stage renal disease, patient was on peritoneal dialysis but switched to hemodialysis in the hospital    The patient is feeling better sitting up in the chair, denies any chest pain or shortness of air, no orthopnea or PND, complaining of being weak, she had dialysis yesterday without any difficulties.        Review of Systems:   As noted above    Objective     Vitals:   Temp:  [97 °F (36.1 °C)-100.5 °F (38.1 °C)] 98.1 °F (36.7 °C)  Heart Rate:  [96-99] 98  Resp:  [16] 16  BP: (116-143)/(77-93) 116/87    Intake/Output Summary (Last 24 hours) at 2023 0853  Last data filed at 2023 0652  Gross per 24 hour   Intake 1152 ml   Output 1500 ml   Net -348 ml       Physical Exam:    General Appearance: Awake, alert, chronically ill and frail  Skin: warm and dry  HEENT: Has NG tube in place  Neck: No JVD  Lungs: Scattered rhonchi, unlabored breathing effort  Heart: RRR, faint rub  Abdomen: soft, no guarding nondistended, normoactive bowels and PD catheter in place with clean exit site  Extremities: no edema, cyanosis or clubbing, temporary dialysis catheter in the right femoral vein  Neuro: Moving all extremities    Scheduled Meds:     amLODIPine, 10 mg, Oral, Q24H  [Held by provider] aspirin, 81 mg, Oral, Daily  carvedilol, 25 mg, Oral, Q12H  chlorhexidine, 15 mL, Mouth/Throat, Q12H  First Mouthwash (Magic Mouthwash), 10 mL, Swish & Spit, Q6H  [Held by provider] heparin (porcine), 5,000 Units, Subcutaneous, Q8H  insulin regular, 2-7 Units, Subcutaneous, Q6H  Lacosamide, 50 mg, Intravenous, Q12H  levETIRAcetam, 250 mg, Intravenous, Q12H  mupirocin, , Each Nare, BID  pantoprazole, 40 mg, Intravenous, Q AM  thiamine (B-1) IV, 200 mg, Intravenous, Q8H      IV Meds:    sodium chloride, 30 mL/hr, Last Rate: 30 mL/hr (11/02/23 1400)      Results Reviewed:   I have personally reviewed the results from the time of this admission to 11/14/2023 08:53 EST     Results from last 7 days   Lab Units 11/14/23  0308 11/13/23  0258 11/12/23  0359 11/10/23  0311 11/09/23  0306 11/08/23  0258   SODIUM mmol/L 134* 133* 134*   < > 137 136   POTASSIUM mmol/L 3.7 3.5 3.6   < > 3.5 3.6   CHLORIDE mmol/L 100 99 99   < > 105 103   CO2 mmol/L 24.0 22.0 22.0   < > 22.2 22.0   BUN mg/dL 21* 32* 21*   < > 29* 18   CREATININE mg/dL 3.31* 4.34* 3.16*   < > 4.58* 3.24*   CALCIUM mg/dL 8.9 8.5* 8.3*   < > 8.7 8.8   BILIRUBIN mg/dL 0.4  --   --   --  0.3 0.4   ALK PHOS U/L 73  --   --   --  67 64   ALT (SGPT) U/L 11  --   --   --  <5 <5   AST (SGOT) U/L 28  --   --   --  21 22   GLUCOSE mg/dL 124* 118* 106*   < > 114* 124*    < > = values in this interval not displayed.       Estimated Creatinine Clearance: 24.3 mL/min (A) (by C-G formula based on SCr of 3.31 mg/dL (H)).    Results from last 7 days   Lab Units 11/14/23 0308 11/12/23 0359 11/11/23  1643 11/11/23  0309 11/10/23  0311 11/09/23  0306   MAGNESIUM mg/dL 1.7  --   --  1.9  --  2.0   PHOSPHORUS mg/dL 2.0* 3.1 0.6* 1.4*   < > 2.8    < > = values in this interval not displayed.             Results from last 7 days   Lab Units 11/14/23  0308 11/13/23  0811 11/12/23  0359 11/11/23  0309 11/10/23  0311   WBC 10*3/mm3 9.31 10.66 11.93* 9.87 9.25   HEMOGLOBIN g/dL 7.8* 8.0* 8.0* 8.5* 8.7*   PLATELETS 10*3/mm3 120* 100* 93* 86* 87*               Assessment / Plan     ASSESSMENT:  End-stage renal disease secondary to lupus nephritis who was on peritoneal dialysis, she was switched since her surgery to hemodialysis she had hemodialysis yesterday without any difficulties her electrolytes within acceptable range and appears to be euvolemic, since the patient is up in the chair I will remove the temporary dialysis catheter and start PD again.  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  She then developed cardiac tamponade and had reexploration done with removal of large clot compressing the right ventricle  Seizure disorder  Hypertension with ESRD, stable    PLAN:  Continue the same treatment  Restart peritoneal dialysis  Will not give iron nor YULIANA since it will be ineffective in the presence of inflammation  Surveillance labs    I reviewed the chart and other providers note, I reviewed imaging and lab data.  Copied text in this note has been reviewed and is accurate as of 11/14/23.     Thank you for involving us in the care of Dee Herrera.  Please feel free to call with any questions.    Isaac Diaz MD  11/14/23  08:53 [facility]    [facility]  [phone]    Please note that portions of this note were completed with a voice recognition program.

## 2023-11-14 NOTE — PROGRESS NOTES
[facility]    Noted patient to restart peritoneal dialysis.  We are unable to take patients on PD.  Will continue to follow for disposition.    Thank you,  Regi Ramirez RN  Rehab Admission Nurse

## 2023-11-14 NOTE — PROGRESS NOTES
" LOS: 19 days   Patient Care Team:  Margarita Woods APRN as PCP - General (Nurse Practitioner)  Winnie Sanchez MD as Referring Physician (Obstetrics and Gynecology)  Norberto Almaraz MD PhD as Consulting Physician (Hematology and Oncology)  Lupis Thomas MD as Consulting Physician (Nephrology)  Hair Mckeon MD as Consulting Physician (Nephrology)  Juana Taylor MD as Consulting Physician (Cardiology)    Chief Complaint: post op    Subjective:  Symptoms:  No shortness of breath, cough or chest pain.    Diet:  Poor intake.  No nausea or vomiting.    Activity level: Impaired due to weakness.    Pain:  She complains of pain that is mild.  Pain is well controlled.          Vital Signs  Temp:  [97 °F (36.1 °C)-100.5 °F (38.1 °C)] 97.9 °F (36.6 °C)  Heart Rate:  [91-99] 97  Resp:  [16] 16  BP: (117-143)/(77-93) 117/79  Body mass index is 22.92 kg/m².    Intake/Output Summary (Last 24 hours) at 11/14/2023 0721  Last data filed at 11/14/2023 0652  Gross per 24 hour   Intake 1182 ml   Output 1500 ml   Net -318 ml     No intake/output data recorded.          11/12/23  0401 11/13/23  0649 11/14/23  0616   Weight: 51.7 kg (113 lb 15.7 oz) 61.1 kg (134 lb 11.2 oz) (Verified with RN) 62.4 kg (137 lb 9.1 oz)         Objective:  General Appearance:  Comfortable and in no acute distress.    Vital signs: (most recent): Blood pressure 116/87, pulse 98, temperature 98.1 °F (36.7 °C), temperature source Oral, resp. rate 16, height 165 cm (64.96\"), weight 62.4 kg (137 lb 9.1 oz), last menstrual period 08/10/2022, SpO2 98%, not currently breastfeeding.  Vital signs are normal.  No fever.    Output: No urine output and producing stool.    Lungs:  Normal effort and normal respiratory rate.  There are decreased breath sounds.    Heart: Normal rate.  Regular rhythm.  (SR on tele monitor)  Abdomen: Abdomen is soft.  Bowel sounds are normal.     Pulses: Distal pulses are intact.    Neurological: Patient is oriented to " "person, place and time.  (drowsy).    Skin:  Warm and dry.  (Sternal dressing clean, dry, and intact)          Results Review:        WBC WBC   Date Value Ref Range Status   11/14/2023 9.31 3.40 - 10.80 10*3/mm3 Final   11/13/2023 10.66 3.40 - 10.80 10*3/mm3 Final   11/12/2023 11.93 (H) 3.40 - 10.80 10*3/mm3 Final      HGB Hemoglobin   Date Value Ref Range Status   11/14/2023 7.8 (L) 12.0 - 15.9 g/dL Final   11/13/2023 8.0 (L) 12.0 - 15.9 g/dL Final   11/12/2023 8.0 (L) 12.0 - 15.9 g/dL Final      HCT Hematocrit   Date Value Ref Range Status   11/14/2023 24.2 (L) 34.0 - 46.6 % Final   11/13/2023 23.8 (L) 34.0 - 46.6 % Final   11/12/2023 23.9 (L) 34.0 - 46.6 % Final      Platelets Platelets   Date Value Ref Range Status   11/14/2023 120 (L) 140 - 450 10*3/mm3 Final   11/13/2023 100 (L) 140 - 450 10*3/mm3 Final   11/12/2023 93 (L) 140 - 450 10*3/mm3 Final        PT/INR:  No results found for: \"PROTIME\"/No results found for: \"INR\"    Sodium Sodium   Date Value Ref Range Status   11/14/2023 134 (L) 136 - 145 mmol/L Final   11/13/2023 133 (L) 136 - 145 mmol/L Final   11/12/2023 134 (L) 136 - 145 mmol/L Final      Potassium Potassium   Date Value Ref Range Status   11/14/2023 3.7 3.5 - 5.2 mmol/L Final   11/13/2023 3.5 3.5 - 5.2 mmol/L Final   11/12/2023 3.6 3.5 - 5.2 mmol/L Final      Chloride Chloride   Date Value Ref Range Status   11/14/2023 100 98 - 107 mmol/L Final   11/13/2023 99 98 - 107 mmol/L Final   11/12/2023 99 98 - 107 mmol/L Final      Bicarbonate CO2   Date Value Ref Range Status   11/14/2023 24.0 22.0 - 29.0 mmol/L Final   11/13/2023 22.0 22.0 - 29.0 mmol/L Final   11/12/2023 22.0 22.0 - 29.0 mmol/L Final      BUN BUN   Date Value Ref Range Status   11/14/2023 21 (H) 6 - 20 mg/dL Final   11/13/2023 32 (H) 6 - 20 mg/dL Final   11/12/2023 21 (H) 6 - 20 mg/dL Final      Creatinine Creatinine   Date Value Ref Range Status   11/14/2023 3.31 (H) 0.57 - 1.00 mg/dL Final   11/13/2023 4.34 (H) 0.57 - 1.00 mg/dL " Final   11/12/2023 3.16 (H) 0.57 - 1.00 mg/dL Final      Calcium Calcium   Date Value Ref Range Status   11/14/2023 8.9 8.6 - 10.5 mg/dL Final   11/13/2023 8.5 (L) 8.6 - 10.5 mg/dL Final   11/12/2023 8.3 (L) 8.6 - 10.5 mg/dL Final      Magnesium Magnesium   Date Value Ref Range Status   11/14/2023 1.7 1.6 - 2.6 mg/dL Final          amLODIPine, 10 mg, Oral, Q24H  [Held by provider] aspirin, 81 mg, Oral, Daily  carvedilol, 25 mg, Oral, Q12H  chlorhexidine, 15 mL, Mouth/Throat, Q12H  First Mouthwash (Magic Mouthwash), 10 mL, Swish & Spit, Q6H  [Held by provider] heparin (porcine), 5,000 Units, Subcutaneous, Q8H  insulin regular, 2-7 Units, Subcutaneous, Q6H  Lacosamide, 50 mg, Intravenous, Q12H  levETIRAcetam, 250 mg, Intravenous, Once  levETIRAcetam, 250 mg, Intravenous, Q12H  mupirocin, , Each Nare, BID  pantoprazole, 40 mg, Intravenous, Q AM  thiamine (B-1) IV, 200 mg, Intravenous, Q8H      sodium chloride, 30 mL/hr, Last Rate: 30 mL/hr (11/02/23 1400)      Assessment & Plan  -Ascending aortic aneurysm with dissection- s/p ascending aortic dissection repair/proximal replacement, gacron interposition graft, kelli procedure; insertion of right femoral shiley- POD#12 Adam  -cardiac tamponade- reexploration for bleeding, removal of large clot compressing the RV- POD#8 [place]  - severe aortic valve insufficiency  - ESRD on PD  - Lupus--on Cellcept/plaquenil; currently held  - hx of seizures  - chronic immunosuppression  - hypertension  - chronic anemia  - TCP--chronic; lovenox on hold     She is more awake this morning  Weaned to RA and tolerating  Will discuss about discontinue her central line if she has good PIV access  Hgb 7.8 this morning. Will discuss need for blood with Dr. Medina  Plan for PD today-- okay for removal of femoral line if okay with renal  Remains on nocturnal feeds-- calorie count in progress  Remains in SR with rates in the 80s. Will discuss AV wires with Dr. Medina  She is very weak.  Continue aggressive PT/OT  Continue supportive care    CAESAR Cotto  11/14/23  07:21 EST

## 2023-11-14 NOTE — PROGRESS NOTES
[facility] Follow Up    Chief Complaint: follow up aortic aneurysm/dissection, severe AR      Interval History: More awake today.  However she seems more depressed and flat today than her baseline.  She denies any significant shortness of breath or chest pain.    Objective:     Objective:  Temp:  [97 °F (36.1 °C)-100.5 °F (38.1 °C)] 97.9 °F (36.6 °C)  Heart Rate:  [91-99] 97  Resp:  [16] 16  BP: (117-143)/(77-93) 117/79     Intake/Output Summary (Last 24 hours) at 11/14/2023 0728  Last data filed at 11/14/2023 0652  Gross per 24 hour   Intake 1182 ml   Output 1500 ml   Net -318 ml     Body mass index is 22.92 kg/m².      11/12/23  0401 11/13/23  0649 11/14/23  0616   Weight: 51.7 kg (113 lb 15.7 oz) 61.1 kg (134 lb 11.2 oz) (Verified with RN) 62.4 kg (137 lb 9.1 oz)     Weight change: 1.3 kg (2 lb 13.9 oz)      Physical Exam:   General : Alert, cooperative, in no acute distress.  Neuro: Alert,cooperative and oriented.  Lungs: CTAB. Normal respiratory effort and rate.  CV: Regular rate and rhythm, normal S1 and S2, no murmurs, gallops or rubs.  ABD: Soft, nontender, nondistended. Positive bowel sounds.  Extr: No edema or cyanosis, moves all extremities.    Lab Review:   Results from last 7 days   Lab Units 11/14/23  0308 11/13/23  0258 11/10/23  0311 11/09/23  0306   SODIUM mmol/L 134* 133*   < > 137   POTASSIUM mmol/L 3.7 3.5   < > 3.5   CHLORIDE mmol/L 100 99   < > 105   CO2 mmol/L 24.0 22.0   < > 22.2   BUN mg/dL 21* 32*   < > 29*   CREATININE mg/dL 3.31* 4.34*   < > 4.58*   GLUCOSE mg/dL 124* 118*   < > 114*   CALCIUM mg/dL 8.9 8.5*   < > 8.7   AST (SGOT) U/L 28  --   --  21   ALT (SGPT) U/L 11  --   --  <5    < > = values in this interval not displayed.         Results from last 7 days   Lab Units 11/14/23  0308 11/13/23  0811   WBC 10*3/mm3 9.31 10.66   HEMOGLOBIN g/dL 7.8* 8.0*   HEMATOCRIT % 24.2* 23.8*   PLATELETS 10*3/mm3 120* 100*         Results from last 7 days   Lab Units  "11/14/23  0308 11/11/23  0309   MAGNESIUM mg/dL 1.7 1.9           Invalid input(s): \"LDLCALC\"      Results from last 7 days   Lab Units 11/13/23  0258   TSH uIU/mL 5.990*     I reviewed the patient's new clinical results.  I personally viewed and interpreted the patient's EKG  Current Medications:   Scheduled Meds:amLODIPine, 10 mg, Oral, Q24H  [Held by provider] aspirin, 81 mg, Oral, Daily  carvedilol, 25 mg, Oral, Q12H  chlorhexidine, 15 mL, Mouth/Throat, Q12H  First Mouthwash (Magic Mouthwash), 10 mL, Swish & Spit, Q6H  [Held by provider] heparin (porcine), 5,000 Units, Subcutaneous, Q8H  insulin regular, 2-7 Units, Subcutaneous, Q6H  Lacosamide, 50 mg, Intravenous, Q12H  levETIRAcetam, 250 mg, Intravenous, Q12H  mupirocin, , Each Nare, BID  pantoprazole, 40 mg, Intravenous, Q AM  thiamine (B-1) IV, 200 mg, Intravenous, Q8H      Continuous Infusions:sodium chloride, 30 mL/hr, Last Rate: 30 mL/hr (11/02/23 1400)        Allergies:  Allergies   Allergen Reactions    Minoxidil Other (See Comments) and Hives     Pericardial effusion .       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  Status post repair and proximal aortic replacement on 11/2 .  Severe aortic valve regurgitation.  Due to #3.  Now status post repair on 11/2.  Cardiac tamponade.  Due to pericardial thrombus anteriorly and compressing right ventricle.  Underwent reexploration with clot removal and treatment of a small amount of bleeding at the suture line on 11/6.  Cardiogenic shock.  Due to #1.  Resolved following evacuation of clot.  Seizures.  Started postoperatively.  Currently appears to be doing from this respect on medical therapy.    Postoperative respiratory failure.  Doing well on room air.  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  On peritoneal dialysis normally.  Has been on hemodialysis postoperatively.    Hyponatremia.  Resolved.  Hypertension.  Improved with maximum dose amlodipine and " carvedilol.  Chronic anemia.  Currently stable.  Systemic lupus erythematosus.  Plaquenil and mycophenolate placed on hold for surgery and remain on hold postoperatively.  Thrombocytopenia.  Stable  13. Lethargy.  Small subdural hematomas noted post-operatively that were not felt to be clinically significant by neurosurgery.  Stable on repeat imaging at that time.  Plan for MRI noted by neurology.  However she seems much more awake today.  14.  Depression.  Suspect she was struggling with this even prior to her surgery.  Understandably this appears to be worse.  It does not appear that she was ever on any antidepressant medications even prior to this admission.    - Continue current cardiac management.  - Plan for MRI noted per neurology.  - Depression appears to be more of an issue now.  I am concerned that this will prevent her from participating in recovery.  I think she needs to be started on something for depression.  Will defer to hospitalist.  She may need access center involved.    Juana Taylor MD  11/14/23  07:28 EST

## 2023-11-14 NOTE — SIGNIFICANT NOTE
11/14/23 1544   OTHER   Discipline physical therapist   Rehab Time/Intention   Session Not Performed other (see comments)  (pt receiving PD and blood per RN; will check back tomorrow)   Therapy Assessment/Plan (PT)   Criteria for Skilled Interventions Met (PT) skilled treatment is necessary   Recommendation   PT - Next Appointment 11/15/23

## 2023-11-14 NOTE — PROGRESS NOTES
"  Daily Progress Note.   [facility] CARDIOVASC UNIT  11/14/2023    Patient:  Name:  Dee Herrera  MRN:  [id number]  1990  33 y.o.  female         CC/reason for visit:status post aortic aneurysm/dissection repair with valve sparing procedure.  Postoperative respiratory insufficiency    Interval History:  Patient is on room air sats 100%  Sitting upright in chair she is fairly drowsy she will denies shortness of breath or chest pain.    Physical Exam:  /84   Pulse 96   Temp 99.3 °F (37.4 °C) (Oral)   Resp 16   Ht 165 cm (64.96\")   Wt 62.4 kg (137 lb 9.1 oz)   LMP 08/10/2022 (Approximate)   SpO2 98%   BMI 22.92 kg/m²   Body mass index is 22.92 kg/m².    Intake/Output Summary (Last 24 hours) at 11/14/2023 1510  Last data filed at 11/14/2023 1000  Gross per 24 hour   Intake 1595 ml   Output 0 ml   Net 1595 ml     General appearance: Nontoxic, conversant   Eyes: anicteric sclerae, moist conjunctiva  HENT:  oropharynx clear positive core track positive EEG electrodes  Lungs: margarito wheeze or crackles, with unlabored respiratory effort and no intercostal retractions  Heart regular rate rhythm no rub  Psych/neuro:will awaken, very drowsy however will respond to questions briefly with yes or no      Data Review:  Notable Labs:  Results from last 7 days   Lab Units 11/14/23  0308 11/13/23  0811 11/12/23  0359 11/11/23  0309 11/10/23  0311 11/09/23  0306 11/08/23  0258   WBC 10*3/mm3 9.31 10.66 11.93* 9.87 9.25 8.95 10.72   HEMOGLOBIN g/dL 7.8* 8.0* 8.0* 8.5* 8.7* 8.8* 10.0*   PLATELETS 10*3/mm3 120* 100* 93* 86* 87* 92* 112*     Results from last 7 days   Lab Units 11/14/23  0308 11/13/23  0258 11/12/23  0359 11/11/23  1643 11/11/23  0309 11/10/23  0311 11/09/23  0306 11/08/23  0258   SODIUM mmol/L 134* 133* 134*  --  136 135* 137 136   POTASSIUM mmol/L 3.7 3.5 3.6  --  3.2* 3.4* 3.5 3.6   CHLORIDE mmol/L 100 99 99  --  104 104 105 103   CO2 mmol/L 24.0 22.0 22.0  --  21.5* 22.0 22.2 22.0 "   BUN mg/dL 21* 32* 21*  --  42* 24* 29* 18   CREATININE mg/dL 3.31* 4.34* 3.16*  --  4.76* 3.55* 4.58* 3.24*   GLUCOSE mg/dL 124* 118* 106*  --  122* 117* 114* 124*   CALCIUM mg/dL 8.9 8.5* 8.3*  --  8.4* 8.7 8.7 8.8   MAGNESIUM mg/dL 1.7  --   --   --  1.9  --  2.0 1.8   PHOSPHORUS mg/dL 2.0*  --  3.1 0.6* 1.4* 1.8* 2.8 3.1   Estimated Creatinine Clearance: 24.3 mL/min (A) (by C-G formula based on SCr of 3.31 mg/dL (H)).    Results from last 7 days   Lab Units 11/14/23  0308 11/13/23  0811 11/13/23  0258 11/12/23  0359 11/11/23  0309 11/10/23  0311 11/09/23  0306 11/08/23  0258   AST (SGOT) U/L 28  --   --   --   --   --  21 22   ALT (SGPT) U/L 11  --   --   --   --   --  <5 <5   PROCALCITONIN ng/mL  --   --   --   --  5.05*  --   --   --    FERRITIN ng/mL  --   --  3,424.00*  --   --   --   --   --    PLATELETS 10*3/mm3 120* 100*  --  93* 86*   < > 92* 112*    < > = values in this interval not displayed.       Results from last 7 days   Lab Units 11/13/23  0809 11/10/23  0736 11/09/23  1443 11/08/23  1409 11/07/23  6909   PH, ARTERIAL pH units 7.527* 7.461* 7.499* 7.463* 7.471*   PCO2, ARTERIAL mm Hg 25.8* 30.6* 29.9* 30.9* 27.2*   PO2 ART mm Hg 73.0* 166.0* 158.3* 141.5* 153.9*   HCO3 ART mmol/L 21.5* 21.9* 23.3 22.1 19.9*       Imaging:  Reviewed chest images personally from past 3 days    Chest x-ray vascular congestion bilateral effusions    ASSESSMENT  /  PLAN:  Bilateral pleural effusions  Respiratory sufficiency postoperative  Elevated procalcitonin  Ascending aortic aneurysm with dissection- s/p ascending aortic dissection repair/proximal replacement, gacron interposition graft, kelli procedure; insertion of right femoral shiley   Cardiac tamponade- reexploration for bleeding, removal of large clot compressing the RV- POD#4 Camporrotondo     Thrombocytopenia    Hypertension secondary to other renal disorders    Pancytopenia    Systemic lupus erythematosus - holding IS    End stage renal disease on  dialysis    Seizure disorder    Elevated liver function tests    Essential hypertension    Peritoneal dialysis catheter in place    Anemia due to chronic kidney disease, on chronic dialysis    Hyponatremia    Moderate malnutrition    Chronic diastolic CHF (congestive heart failure)    Aortic valve lesion    Severe aortic valve regurgitation     From pulmonary perspective:  Continue hemodialysis per nephrology monitor for pleural effusions.  If unable to remove the left fluid with effective Hd would consider thoracentesis or if patient spikes fevers and infectious source evaluation is needed.    ABG without any significant hypercapnia  Patient on room air without any hypoxia or shortness of breath  Pulmonary status stable.      We will go ahead and sign off from a pulmonary perspective.    Please call us back if we can be of any further assistance.    Continue care per internal medicine neurology cardiology cardiothoracic surgery and nephrology services        Electronically signed by Govind Edmond MD, 11/14/23, 3:11 PM EST.

## 2023-11-15 ENCOUNTER — APPOINTMENT (OUTPATIENT)
Dept: GENERAL RADIOLOGY | Facility: HOSPITAL | Age: 33
DRG: 219 | End: 2023-11-15
Payer: MEDICARE

## 2023-11-15 LAB
ALBUMIN SERPL-MCNC: 2.5 G/DL (ref 3.5–5.2)
ANION GAP SERPL CALCULATED.3IONS-SCNC: 11.3 MMOL/L (ref 5–15)
BASOPHILS # BLD AUTO: 0.03 10*3/MM3 (ref 0–0.2)
BASOPHILS NFR BLD AUTO: 0.3 % (ref 0–1.5)
BUN SERPL-MCNC: 26 MG/DL (ref 6–20)
BUN/CREAT SERPL: 6.5 (ref 7–25)
CALCIUM SPEC-SCNC: 8.8 MG/DL (ref 8.6–10.5)
CHLORIDE SERPL-SCNC: 98 MMOL/L (ref 98–107)
CO2 SERPL-SCNC: 20.7 MMOL/L (ref 22–29)
CREAT SERPL-MCNC: 4.03 MG/DL (ref 0.57–1)
DEPRECATED RDW RBC AUTO: 46.2 FL (ref 37–54)
EGFRCR SERPLBLD CKD-EPI 2021: 14.5 ML/MIN/1.73
EOSINOPHIL # BLD AUTO: 0.03 10*3/MM3 (ref 0–0.4)
EOSINOPHIL NFR BLD AUTO: 0.3 % (ref 0.3–6.2)
ERYTHROCYTE [DISTWIDTH] IN BLOOD BY AUTOMATED COUNT: 14.6 % (ref 12.3–15.4)
GLUCOSE BLDC GLUCOMTR-MCNC: 100 MG/DL (ref 70–130)
GLUCOSE BLDC GLUCOMTR-MCNC: 115 MG/DL (ref 70–130)
GLUCOSE BLDC GLUCOMTR-MCNC: 129 MG/DL (ref 70–130)
GLUCOSE BLDC GLUCOMTR-MCNC: 146 MG/DL (ref 70–130)
GLUCOSE BLDC GLUCOMTR-MCNC: 176 MG/DL (ref 70–130)
GLUCOSE SERPL-MCNC: 112 MG/DL (ref 65–99)
HCT VFR BLD AUTO: 24.6 % (ref 34–46.6)
HGB BLD-MCNC: 8.2 G/DL (ref 12–15.9)
LYMPHOCYTES # BLD AUTO: 0.38 10*3/MM3 (ref 0.7–3.1)
LYMPHOCYTES NFR BLD AUTO: 3.6 % (ref 19.6–45.3)
MAGNESIUM SERPL-MCNC: 1.7 MG/DL (ref 1.6–2.6)
MCH RBC QN AUTO: 28.7 PG (ref 26.6–33)
MCHC RBC AUTO-ENTMCNC: 33.3 G/DL (ref 31.5–35.7)
MCV RBC AUTO: 86 FL (ref 79–97)
MONOCYTES # BLD AUTO: 1.12 10*3/MM3 (ref 0.1–0.9)
MONOCYTES NFR BLD AUTO: 10.5 % (ref 5–12)
NEUTROPHILS NFR BLD AUTO: 8.94 10*3/MM3 (ref 1.7–7)
NEUTROPHILS NFR BLD AUTO: 84.2 % (ref 42.7–76)
PHOSPHATE SERPL-MCNC: 2.7 MG/DL (ref 2.5–4.5)
PLATELET # BLD AUTO: 118 10*3/MM3 (ref 140–450)
PMV BLD AUTO: 13.4 FL (ref 6–12)
POTASSIUM SERPL-SCNC: 3.3 MMOL/L (ref 3.5–5.2)
RBC # BLD AUTO: 2.86 10*6/MM3 (ref 3.77–5.28)
SODIUM SERPL-SCNC: 130 MMOL/L (ref 136–145)
WBC NRBC COR # BLD: 10.62 10*3/MM3 (ref 3.4–10.8)

## 2023-11-15 PROCEDURE — 25010000002 HEPARIN (PORCINE) PER 1000 UNITS: Performed by: INTERNAL MEDICINE

## 2023-11-15 PROCEDURE — 25010000002 FUROSEMIDE PER 20 MG: Performed by: INTERNAL MEDICINE

## 2023-11-15 PROCEDURE — 97530 THERAPEUTIC ACTIVITIES: CPT

## 2023-11-15 PROCEDURE — C9254 INJECTION, LACOSAMIDE: HCPCS | Performed by: NURSE PRACTITIONER

## 2023-11-15 PROCEDURE — 63710000001 INSULIN REGULAR HUMAN PER 5 UNITS: Performed by: NURSE PRACTITIONER

## 2023-11-15 PROCEDURE — 25010000002 LACOSAMIDE 200 MG/20ML SOLUTION

## 2023-11-15 PROCEDURE — 99024 POSTOP FOLLOW-UP VISIT: CPT | Performed by: THORACIC SURGERY (CARDIOTHORACIC VASCULAR SURGERY)

## 2023-11-15 PROCEDURE — 80069 RENAL FUNCTION PANEL: CPT | Performed by: INTERNAL MEDICINE

## 2023-11-15 PROCEDURE — 99233 SBSQ HOSP IP/OBS HIGH 50: CPT | Performed by: NURSE PRACTITIONER

## 2023-11-15 PROCEDURE — 85025 COMPLETE CBC W/AUTO DIFF WBC: CPT | Performed by: INTERNAL MEDICINE

## 2023-11-15 PROCEDURE — 71045 X-RAY EXAM CHEST 1 VIEW: CPT

## 2023-11-15 PROCEDURE — 25010000002 LEVETRIRACETAM PER 10 MG: Performed by: INTERNAL MEDICINE

## 2023-11-15 PROCEDURE — 82948 REAGENT STRIP/BLOOD GLUCOSE: CPT

## 2023-11-15 PROCEDURE — 97110 THERAPEUTIC EXERCISES: CPT

## 2023-11-15 PROCEDURE — 71046 X-RAY EXAM CHEST 2 VIEWS: CPT

## 2023-11-15 PROCEDURE — 25010000002 LACOSAMIDE 200 MG/20ML SOLUTION: Performed by: NURSE PRACTITIONER

## 2023-11-15 PROCEDURE — 99232 SBSQ HOSP IP/OBS MODERATE 35: CPT | Performed by: NURSE PRACTITIONER

## 2023-11-15 PROCEDURE — C9254 INJECTION, LACOSAMIDE: HCPCS

## 2023-11-15 PROCEDURE — 83735 ASSAY OF MAGNESIUM: CPT | Performed by: INTERNAL MEDICINE

## 2023-11-15 RX ORDER — SODIUM CHLORIDE, SODIUM LACTATE, CALCIUM CHLORIDE, MAGNESIUM CHLORIDE AND DEXTROSE 2.5; 538; 448; 18.3; 5.08 G/100ML; MG/100ML; MG/100ML; MG/100ML; MG/100ML
2000 INJECTION, SOLUTION INTRAPERITONEAL
Status: DISCONTINUED | OUTPATIENT
Start: 2023-11-15 | End: 2023-11-15

## 2023-11-15 RX ORDER — LACOSAMIDE 10 MG/ML
100 INJECTION, SOLUTION INTRAVENOUS EVERY 12 HOURS
Status: DISCONTINUED | OUTPATIENT
Start: 2023-11-16 | End: 2023-12-08

## 2023-11-15 RX ORDER — DEXTROSE MONOHYDRATE, SODIUM CHLORIDE, SODIUM LACTATE, CALCIUM CHLORIDE, MAGNESIUM CHLORIDE 2.5; 538; 448; 18.4; 5.08 G/100ML; MG/100ML; MG/100ML; MG/100ML; MG/100ML
2000 SOLUTION INTRAPERITONEAL
Status: DISCONTINUED | OUTPATIENT
Start: 2023-11-15 | End: 2023-11-15

## 2023-11-15 RX ORDER — DEXTROSE MONOHYDRATE, SODIUM CHLORIDE, SODIUM LACTATE, CALCIUM CHLORIDE, MAGNESIUM CHLORIDE 2.5; 538; 448; 18.4; 5.08 G/100ML; MG/100ML; MG/100ML; MG/100ML; MG/100ML
2000 SOLUTION INTRAPERITONEAL
Status: DISCONTINUED | OUTPATIENT
Start: 2023-11-15 | End: 2023-11-16

## 2023-11-15 RX ORDER — FUROSEMIDE 10 MG/ML
40 INJECTION INTRAMUSCULAR; INTRAVENOUS ONCE
Status: COMPLETED | OUTPATIENT
Start: 2023-11-15 | End: 2023-11-15

## 2023-11-15 RX ADMIN — 0.12% CHLORHEXIDINE GLUCONATE 15 ML: 1.2 RINSE ORAL at 22:36

## 2023-11-15 RX ADMIN — LACOSAMIDE 50 MG: 10 INJECTION INTRAVENOUS at 01:12

## 2023-11-15 RX ADMIN — ESCITALOPRAM OXALATE 10 MG: 10 TABLET, FILM COATED ORAL at 09:30

## 2023-11-15 RX ADMIN — LACOSAMIDE 100 MG: 10 INJECTION INTRAVENOUS at 23:54

## 2023-11-15 RX ADMIN — CARVEDILOL 25 MG: 25 TABLET, FILM COATED ORAL at 09:30

## 2023-11-15 RX ADMIN — AMLODIPINE BESYLATE 10 MG: 10 TABLET ORAL at 09:30

## 2023-11-15 RX ADMIN — CARVEDILOL 25 MG: 25 TABLET, FILM COATED ORAL at 22:36

## 2023-11-15 RX ADMIN — LOSARTAN POTASSIUM 25 MG: 25 TABLET, FILM COATED ORAL at 09:30

## 2023-11-15 RX ADMIN — PANTOPRAZOLE SODIUM 40 MG: 40 INJECTION, POWDER, FOR SOLUTION INTRAVENOUS at 06:59

## 2023-11-15 RX ADMIN — LEVETIRACETAM 250 MG: 500 INJECTION, SOLUTION INTRAVENOUS at 09:30

## 2023-11-15 RX ADMIN — LACOSAMIDE 50 MG: 10 INJECTION INTRAVENOUS at 11:53

## 2023-11-15 RX ADMIN — INSULIN HUMAN 2 UNITS: 100 INJECTION, SOLUTION PARENTERAL at 17:21

## 2023-11-15 RX ADMIN — SODIUM CHLORIDE, SODIUM LACTATE, CALCIUM CHLORIDE, MAGNESIUM CHLORIDE AND DEXTROSE 2000 ML: 2.5; 538; 448; 18.3; 5.08 INJECTION, SOLUTION INTRAPERITONEAL at 11:51

## 2023-11-15 RX ADMIN — DEXTROSE MONOHYDRATE, SODIUM CHLORIDE, SODIUM LACTATE, CALCIUM CHLORIDE, MAGNESIUM CHLORIDE 2000 ML: 2.5; 538; 448; 18.4; 5.08 SOLUTION INTRAPERITONEAL at 12:42

## 2023-11-15 RX ADMIN — FUROSEMIDE 40 MG: 10 INJECTION, SOLUTION INTRAMUSCULAR; INTRAVENOUS at 14:03

## 2023-11-15 RX ADMIN — DEXTROSE MONOHYDRATE, SODIUM CHLORIDE, SODIUM LACTATE, CALCIUM CHLORIDE, MAGNESIUM CHLORIDE 2000 ML: 2.5; 538; 448; 18.4; 5.08 SOLUTION INTRAPERITONEAL at 22:37

## 2023-11-15 RX ADMIN — DIPHENHYDRAMINE HYDROCHLORIDE AND LIDOCAINE HYDROCHLORIDE AND ALUMINUM HYDROXIDE AND MAGNESIUM HYDRO 10 ML: KIT at 22:36

## 2023-11-15 RX ADMIN — MUPIROCIN 1 APPLICATION: 20 OINTMENT TOPICAL at 22:36

## 2023-11-15 RX ADMIN — HEPARIN SODIUM: 5000 INJECTION INTRAVENOUS; SUBCUTANEOUS at 17:56

## 2023-11-15 NOTE — PROGRESS NOTES
[facility]    CCP working on SNF that accepts peritoneal dialysis.  Will sign off at this time.  Please contact us if any further assistance needed.    Thank you,  Regi Ramirez RN  Rehab Admission Nurse

## 2023-11-15 NOTE — PROGRESS NOTES
" LOS: 20 days   Patient Care Team:  Margarita Woods APRN as PCP - General (Nurse Practitioner)  Winnie Sanchez MD as Referring Physician (Obstetrics and Gynecology)  Norberto Almaraz MD PhD as Consulting Physician (Hematology and Oncology)  Lupis Thomas MD as Consulting Physician (Nephrology)  Hair Mckeon MD as Consulting Physician (Nephrology)  Juana Taylor MD as Consulting Physician (Cardiology)    Chief Complaint: post op    Subjective:  Symptoms:  No shortness of breath, cough or chest pain.    Diet:  Poor intake.  No nausea or vomiting.    Activity level: Impaired due to weakness.    Pain:  She complains of pain that is mild.  Pain is well controlled.          Vital Signs  Temp:  [98.6 °F (37 °C)-100.9 °F (38.3 °C)] 99.6 °F (37.6 °C)  Heart Rate:  [86-96] 86  Resp:  [16] 16  BP: (102-138)/(73-98) 122/73  Body mass index is 19.73 kg/m².    Intake/Output Summary (Last 24 hours) at 11/15/2023 0906  Last data filed at 11/15/2023 0001  Gross per 24 hour   Intake 2323 ml   Output --   Net 2323 ml     No intake/output data recorded.          11/13/23  0649 11/14/23  0616 11/15/23  0447   Weight: 61.1 kg (134 lb 11.2 oz) (Verified with RN) 62.4 kg (137 lb 9.1 oz) 53.7 kg (118 lb 6.4 oz)         Objective:  General Appearance:  Comfortable and in no acute distress.    Vital signs: (most recent): Blood pressure 118/73, pulse 81, temperature 98.8 °F (37.1 °C), temperature source Oral, resp. rate 16, height 165 cm (64.96\"), weight 53.7 kg (118 lb 6.4 oz), last menstrual period 08/10/2022, SpO2 99%, not currently breastfeeding.  Vital signs are normal.  No fever.    Output: No urine output and producing stool.    Lungs:  Normal effort and normal respiratory rate.  There are decreased breath sounds.    Heart: Normal rate.  Regular rhythm.  (SR on tele monitor)  Abdomen: Abdomen is soft.  Bowel sounds are normal.     Pulses: Distal pulses are intact.    Neurological: Patient is oriented to person, " "place and time.  (drowsy).    Skin:  Warm and dry.  (Sternal dressing clean, dry, and intact)          Results Review:        WBC WBC   Date Value Ref Range Status   11/15/2023 10.62 3.40 - 10.80 10*3/mm3 Final   11/14/2023 9.31 3.40 - 10.80 10*3/mm3 Final   11/13/2023 10.66 3.40 - 10.80 10*3/mm3 Final      HGB Hemoglobin   Date Value Ref Range Status   11/15/2023 8.2 (L) 12.0 - 15.9 g/dL Final   11/14/2023 7.8 (L) 12.0 - 15.9 g/dL Final   11/13/2023 8.0 (L) 12.0 - 15.9 g/dL Final      HCT Hematocrit   Date Value Ref Range Status   11/15/2023 24.6 (L) 34.0 - 46.6 % Final   11/14/2023 24.2 (L) 34.0 - 46.6 % Final   11/13/2023 23.8 (L) 34.0 - 46.6 % Final      Platelets Platelets   Date Value Ref Range Status   11/15/2023 118 (L) 140 - 450 10*3/mm3 Final   11/14/2023 120 (L) 140 - 450 10*3/mm3 Final   11/13/2023 100 (L) 140 - 450 10*3/mm3 Final        PT/INR:  No results found for: \"PROTIME\"/No results found for: \"INR\"    Sodium Sodium   Date Value Ref Range Status   11/15/2023 130 (L) 136 - 145 mmol/L Final   11/14/2023 134 (L) 136 - 145 mmol/L Final   11/13/2023 133 (L) 136 - 145 mmol/L Final      Potassium Potassium   Date Value Ref Range Status   11/15/2023 3.3 (L) 3.5 - 5.2 mmol/L Final   11/14/2023 3.7 3.5 - 5.2 mmol/L Final   11/13/2023 3.5 3.5 - 5.2 mmol/L Final      Chloride Chloride   Date Value Ref Range Status   11/15/2023 98 98 - 107 mmol/L Final   11/14/2023 100 98 - 107 mmol/L Final   11/13/2023 99 98 - 107 mmol/L Final      Bicarbonate CO2   Date Value Ref Range Status   11/15/2023 20.7 (L) 22.0 - 29.0 mmol/L Final   11/14/2023 24.0 22.0 - 29.0 mmol/L Final   11/13/2023 22.0 22.0 - 29.0 mmol/L Final      BUN BUN   Date Value Ref Range Status   11/15/2023 26 (H) 6 - 20 mg/dL Final   11/14/2023 21 (H) 6 - 20 mg/dL Final   11/13/2023 32 (H) 6 - 20 mg/dL Final      Creatinine Creatinine   Date Value Ref Range Status   11/15/2023 4.03 (H) 0.57 - 1.00 mg/dL Final   11/14/2023 3.31 (H) 0.57 - 1.00 mg/dL Final "   11/13/2023 4.34 (H) 0.57 - 1.00 mg/dL Final      Calcium Calcium   Date Value Ref Range Status   11/15/2023 8.8 8.6 - 10.5 mg/dL Final   11/14/2023 8.9 8.6 - 10.5 mg/dL Final   11/13/2023 8.5 (L) 8.6 - 10.5 mg/dL Final      Magnesium Magnesium   Date Value Ref Range Status   11/15/2023 1.7 1.6 - 2.6 mg/dL Final   11/14/2023 1.7 1.6 - 2.6 mg/dL Final          amLODIPine, 10 mg, Oral, Q24H  [Held by provider] aspirin, 81 mg, Oral, Daily  carvedilol, 25 mg, Oral, Q12H  chlorhexidine, 15 mL, Mouth/Throat, Q12H  escitalopram, 10 mg, Oral, Daily  First Mouthwash (Magic Mouthwash), 10 mL, Swish & Spit, Q6H  [Held by provider] heparin (porcine), 5,000 Units, Subcutaneous, Q8H  insulin regular, 2-7 Units, Subcutaneous, Q6H  Lacosamide, 50 mg, Intravenous, Q12H  levETIRAcetam, 250 mg, Intravenous, Q12H  losartan, 25 mg, Oral, Q24H  mupirocin, , Each Nare, BID  pantoprazole, 40 mg, Intravenous, Q AM  UltraBag/Dianeal/1.5% Dextrose low-elen (lucia #[id number]), 2,000 mL, Intraperitoneal, 4 Exchanges Daily - Dwell Overnight      sodium chloride, 30 mL/hr, Last Rate: 30 mL/hr (11/02/23 1400)      Assessment & Plan  -Ascending aortic aneurysm with dissection- s/p ascending aortic dissection repair/proximal replacement, gacron interposition graft, kelli procedure; insertion of right femoral shiley- POD#[id number]  -cardiac tamponade- reexploration for bleeding, removal of large clot compressing the RV- POD#9 Camporrotondo  - severe aortic valve insufficiency  - ESRD on PD  - Lupus--on Cellcept/plaquenil; currently held  - hx of seizures  - chronic immunosuppression  - hypertension  - chronic anemia  - TCP--chronic; lovenox on hold     She is very unmotivated, not eating. Refused to work with speech.  Not doing her PD dialysis    Access was consulted yesterday and she was started on lexapro  She is very withdrawn.   Remains on nocturnal feeds-- calorie count in progress-- not eating at all  Remains in SR with rates in the 80s. Will  discuss AV wires with Dr. Medina  MRI whenever wires removed  Requiring 2L NC today  CXR with increased effusions-- the report says left is greater than right, however it looks to me like her right is worse-- will discuss with Dr. Medina and see about draining in IR  She is very weak. Continue aggressive PT/OT  Continue supportive care    CAESAR Cotto  11/15/23  09:06 EST

## 2023-11-15 NOTE — DISCHARGE PLACEMENT REQUEST
"Luz Elena Herrera (33 y.o. Female)       Date of Birth   1990    Social Security Number       Address   [address] [place] [id number]    Home Phone   [phone]    MRN   [id number]       [employer]   [employer]    Marital Status   Single                            Admission Date   10/26/23    Admission Type   Emergency    Admitting Provider   Mike Lezama MD    Attending Provider   Lakisha Hunt MD    Department, Room/Bed   [facility] CARDIOVASC UNIT, 2220/1       Discharge Date       Discharge Disposition       Discharge Destination                                 Attending Provider: Lakisha Hunt MD    Allergies: Minoxidil    Isolation: None   Infection: None   Code Status: CPR    Ht: 165 cm (64.96\")   Wt: 53.7 kg (118 lb 6.4 oz)    Admission Cmt: None   Principal Problem: Dissecting ascending aortic aneurysm [I71.010]                   Active Insurance as of 10/26/2023       Primary Coverage       Payor Plan Insurance Group Employer/Plan Group    MEDICARE MEDICARE A & B        Payor Plan Address Payor Plan Phone Number Payor Plan Fax Number Effective Dates    [address] [phone]  3/1/2023 - None Entered    [place] [id number]         Subscriber Name Subscriber Birth Date Member ID       LUZ ELENA HERRERA [place] 1990 [id number]                     Emergency Contacts        (Rel.) Home Phone Work Phone Mobile Phone    VIVIDANYELL (Mother) [phone] -- --    PAULINE HERRERA (Grandparent) [phone] -- [phone]                "

## 2023-11-15 NOTE — CASE MANAGEMENT/SOCIAL WORK
Continued Stay Note  [facility]     Patient Name: Dee Herrera  MRN: [id number]  Today's Date: 11/15/2023    Admit Date: 10/26/2023    Plan: [facility] eval pending   Discharge Plan       Row Name 11/15/23 1507       Plan    Plan [facility] eval pending    Plan Comments S/W [facility] in [place] and the do peritoneal dialysis (PD).  S/W [facility] in [facility] and they do PD.  Called all sub-acute rehabs in [place] and they do not do PD.  S/W [facility] [place] and she advised the do PD.  CCP spoke with Pt mother/Joselin via phone and she would not have transportation to go visit Pt in [place] or [facility].  CCP placed rehab referral in [facility].  Referral pending.  CCP following...............Елена RODRIGUEZ/ROBINSON                   Discharge Codes    No documentation.                 Expected Discharge Date and Time       Expected Discharge Date Expected Discharge Time    Nov 17, 2023               Елена Brown RN

## 2023-11-15 NOTE — PLAN OF CARE
The pt was sitting at the EOB sleeping on arrival to her room this AM. She stood with Max A x1 and stood x15 seconds for a linen change. Max A x1 to scoot towards the HOB prior to returning to supine. She declined to participate in any ADL's. PROM provided to her arms - she kept her eyes closed and was unable to actively participate in therapy. Will continue to follow.

## 2023-11-15 NOTE — THERAPY TREATMENT NOTE
Patient Name: Dee Herrera  : 1990    MRN: [id number]                              Today's Date: 11/15/2023       Admit Date: 10/26/2023    Visit Dx:     ICD-10-CM ICD-9-CM   1. Shortness of breath  R06.02 786.05   2. ESRD (end stage renal disease) on dialysis  N18.6 585.6    Z99.2 V45.11   3. Hyponatremia  E87.1 276.1   4. Generalized abdominal pain  R10.84 789.07   5. Elevated lactic acid level  R79.89 276.2   6. Elevated troponin  R79.89 790.6   7. Severe aortic valve regurgitation  I35.1 424.1   8. 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 syndrome    Systemic lupus erythematosus    Lupus nephritis, ISN/RPS class IV    Hypokalemia    Hypocalcemia    COVID-19    Hospital discharge follow-up    Stage 5 chronic kidney disease    Cardiac cirrhosis    Pancreatitis    Duodenitis    Regional enteritis of small bowel    Pericardial effusion    End stage renal disease on dialysis    Hemodialysis status    Seizure disorder    Elevated liver function tests    C. difficile colitis    Anemia, chronic disease    Essential hypertension    Peritoneal dialysis catheter in place    Anemia due to chronic kidney disease, on chronic dialysis    Alternating constipation and diarrhea    Abnormal stress test    Hyponatremia    Poor appetite    Moderate malnutrition    Chronic diastolic CHF (congestive heart failure)    Aortic valve lesion    Severe aortic valve regurgitation    Dissecting ascending aortic aneurysm     Past Medical History:   Diagnosis Date    Anasarca     PER CT SCAN    Dry skin     ESRD (end stage renal disease) on dialysis     TUES, THURS,  SAT [facility]MA[facility]ASER    History of abdominal pain     History of anemia     History of transfusion     Hypertension     Iron deficiency anemia 09/27/2021    Lupus (systemic lupus erythematosus) 07/30/2022    Migraine     Other specified nutritional anemias     Pericardial effusion     Renal insufficiency     Seizures     STATES LAST WAS 1/2023    Shortness of breath     OCCASIONAL    Vitamin D deficiency 09/27/2021     Past Surgical History:   Procedure Laterality Date    ASCENDING AORTIC ANEURYSM REPAIR W/ MECHANICAL AORTIC VALVE REPLACEMENT N/A 11/2/2023    Procedure: CHETNA STERNOTOMY, AORTIC ROOT REPLACEMENT WITH VALVE SPARING MISHEL PROCEDURE, REPLACEMENT OF ASCENDING AORTA, RIGHT FEMORAL DIALYSIS CATHETER PLACEMENT AND PRP;  Surgeon: Chris Medina MD;  Location: [facility] CVOR;  Service: Cardiothoracic;  Laterality: N/A;    CARDIAC CATHETERIZATION N/A 06/14/2023    Procedure: Coronary angiography;  Surgeon: Juana Taylor MD;  Location: [facility] CATH INVASIVE LOCATION;  Service: Cardiovascular;  Laterality: N/A;    CARDIAC CATHETERIZATION N/A 06/14/2023    Procedure: Left heart cath;  Surgeon: Juana Taylor MD;  Location: [facility] CATH INVASIVE LOCATION;  Service: Cardiovascular;  Laterality: N/A;    CARDIAC CATHETERIZATION N/A 06/14/2023    Procedure: Right Heart Cath;  Surgeon: Juana Taylor MD;  Location: [facility] CATH INVASIVE LOCATION;  Service: Cardiovascular;  Laterality: N/A;    COLONOSCOPY N/A 7/20/2023    Procedure: COLONOSCOPY to cecum with biopsy;  Surgeon: Drew Kaminski MD;  Location: [facility] ENDOSCOPY;  Service: Gastroenterology;  Laterality: N/A;  PRE - diarrhea, constipation  POST - fair prep, normal    CORONARY ARTERY BYPASS GRAFT N/A 11/6/2023    Procedure: STERNAL EXPLORATION AND WASH OUT;  Surgeon: Jr Mitesh Quiroz MD;  Location: [facility] CVOR;  Service: Cardiothoracic;  Laterality: N/A;    ENDOSCOPY N/A 7/20/2023    Procedure: ESOPHAGOGASTRODUODENOSCOPY with biopsy;  Surgeon: Gordy  MD Drew;  Location: [facility] ENDOSCOPY;  Service: Gastroenterology;  Laterality: N/A;  PRE - abn ct abd  POST - gastritis    INSERTION HEMODIALYSIS CATHETER N/A 07/26/2022    Procedure: RIGHT TUNNELED DIALYSIS CATHETER PLACEMENT;  Surgeon: Diandra Adhikari MD;  Location: [facility] MAIN OR;  Service: Vascular;  Laterality: N/A;    INSERTION PERITONEAL DIALYSIS CATHETER N/A 04/03/2023    Procedure: INSERTION PERITONEAL DIALYSIS CATHETER LAPAROSCOPIC, omentumpexy;  Surgeon: Jemal Loyola MD;  Location: [facility] OR;  Service: General;  Laterality: N/A;    TONSILLECTOMY        General Information       Row Name 11/15/23 1511          Physical Therapy Time and Intention    Document Type therapy note (daily note)  -PC (r) SK (t) PC (c)     Mode of Treatment individual therapy;physical therapy  -PC (r) SK (t) PC (c)       Row Name 11/15/23 1511          General Information    Patient Profile Reviewed yes  -PC (r) SK (t) PC (c)     Existing Precautions/Restrictions fall;sternal;cardiac  -PC (r) SK (t) PC (c)       Row Name 11/15/23 1511          Cognition    Orientation Status (Cognition) unable/difficult to assess  -PC (r) SK (t) PC (c)       Row Name 11/15/23 1511          Safety Issues, Functional Mobility    Safety Issues Affecting Function (Mobility) safety precaution awareness;insight into deficits/self-awareness;sequencing abilities;awareness of need for assistance  -PC (r) SK (t) PC (c)     Impairments Affecting Function (Mobility) balance;coordination;endurance/activity tolerance;motor control;strength;shortness of breath;postural/trunk control;pain  -PC (r) SK (t) PC (c)     Comment, Safety Issues/Impairments (Mobility) non skid socks, gait belt  -PC (r) SK (t) PC (c)               User Key  (r) = Recorded By, (t) = Taken By, (c) = Cosigned By      Initials Name Provider Type    PC Elba Whaley, PT Physical Therapist    SK Supriya Harris, PT Student PT Student                   Mobility        Row Name 11/15/23 1512          Bed Mobility    Bed Mobility supine-sit;rolling right  -PC (r) SK (t) PC (c)     Rolling Right Baldwin (Bed Mobility) minimum assist (75% patient effort);verbal cues;nonverbal cues (demo/gesture);2 person assist  -PC (r) SK (t) PC (c)     Supine-Sit Baldwin (Bed Mobility) minimum assist (75% patient effort);verbal cues;nonverbal cues (demo/gesture);2 person assist  -PC (r) SK (t) PC (c)     Comment, (Bed Mobility) lethargic  -PC (r) SK (t) PC (c)       Row Name 11/15/23 1512          Sit-Stand Transfer    Sit-Stand Baldwin (Transfers) verbal cues;nonverbal cues (demo/gesture);minimum assist (75% patient effort);2 person assist  -PC (r) SK (t) PC (c)     Assistive Device (Sit-Stand Transfers) other (see comments)  BHHA  -PC (r) SK (t) PC (c)       Row Name 11/15/23 1512          Gait/Stairs (Locomotion)    Baldwin Level (Gait) verbal cues;nonverbal cues (demo/gesture);minimum assist (75% patient effort);2 person assist  -PC (r) SK (t) PC (c)     Assistive Device (Gait) other (see comments)  BHHA  -PC (r) SK (t) PC (c)     Distance in Feet (Gait) 3', 15'  -PC (r) SK (t) PC (c)     Deviations/Abnormal Patterns (Gait) amrita decreased;festinating/shuffling;gait speed decreased;stride length decreased  -PC (r) SK (t) PC (c)     Bilateral Gait Deviations heel strike decreased  -PC (r) SK (t) PC (c)     Comment, (Gait/Stairs) slow, but steady gait  -PC (r) SK (t) PC (c)               User Key  (r) = Recorded By, (t) = Taken By, (c) = Cosigned By      Initials Name Provider Type    PC Elba Whaley, PT Physical Therapist    SK Supriya Harris, HYUN Student PT Student                   Obj/Interventions       Row Name 11/15/23 1514          Motor Skills    Therapeutic Exercise --  5x BLE AP, LAQ, SM  -PC (r) SK (t) PC (c)       Row Name 11/15/23 1514          Balance    Balance Assessment standing static balance;standing dynamic balance  -PC (r) SK (t) PC (c)      Static Standing Balance verbal cues;non-verbal cues (demo/gesture);minimal assist  -PC (r) SK (t) PC (c)     Dynamic Standing Balance verbal cues;non-verbal cues (demo/gesture);minimal assist  -PC (r) SK (t) PC (c)     Position/Device Used, Standing Balance other (see comments)  BHHA  -PC (r) SK (t) PC (c)     Comment, Balance pt initially required min assist to sit EOB, but was then able to sit with SBA  -PC (r) SK (t) PC (c)               User Key  (r) = Recorded By, (t) = Taken By, (c) = Cosigned By      Initials Name Provider Type    PC Elba Whaley, PT Physical Therapist    Supriya Correia, PT Student PT Student                   Goals/Plan    No documentation.                  Clinical Impression       Row Name 11/15/23 1516          Pain    Pre/Posttreatment Pain Comment pt did not verbalize c/o pain this date  -PC (r) SK (t) PC (c)       Row Name 11/15/23 1516          Plan of Care Review    Plan of Care Reviewed With patient  -PC (r) SK (t) PC (c)     Outcome Evaluation Pt presents in bed with nsg aid present. Performed bed mobility with min ax2 and was able to sit EOB with min/SBA. Pt ambulated from bed to chair and then 15' in the room with min HHAx2. Pt cont to demonstrate functional impairments of decreased activity tolerance and weakness. PT will continue to follow and progress activity as pt tolerates.  -PC (r) SK (t) PC (c)       Row Name 11/15/23 1516          Positioning and Restraints    Pre-Treatment Position in bed  -PC (r) SK (t) PC (c)     Post Treatment Position chair  -PC (r) SK (t) PC (c)     In Chair reclined;call light within reach;encouraged to call for assist;exit alarm on;with other staff  with nsg aid  -PC (r) SK (t) PC (c)               User Key  (r) = Recorded By, (t) = Taken By, (c) = Cosigned By      Initials Name Provider Type    PC Elba Whaley, PT Physical Therapist    Supriya Correia, PT Student PT Student                   Outcome Measures       Row Name  11/15/23 1521 11/15/23 0909       How much help from another person do you currently need...    Turning from your back to your side while in flat bed without using bedrails? 3  -PC (r) SK (t) PC (c) 1  -KM    Moving from lying on back to sitting on the side of a flat bed without bedrails? 3  -PC (r) SK (t) PC (c) 1  -KM    Moving to and from a bed to a chair (including a wheelchair)? 3  -PC (r) SK (t) PC (c) 1  -KM    Standing up from a chair using your arms (e.g., wheelchair, bedside chair)? 3  -PC (r) SK (t) PC (c) 2  -KM    Climbing 3-5 steps with a railing? 1  -PC (r) SK (t) PC (c) 1  -KM    To walk in hospital room? 3  -PC (r) SK (t) PC (c) 2  -KM    AM-PAC 6 Clicks Score (PT) 16  -PC (r) SK (t) 8  -KM    Highest Level of Mobility Goal 5 --> Static standing  -PC (r) SK (t) 3 --> Sit at edge of bed  -KM      Row Name 11/15/23 1521          Functional Assessment    Outcome Measure Options AM-PAC 6 Clicks Basic Mobility (PT)  -PC (r) SK (t) PC (c)               User Key  (r) = Recorded By, (t) = Taken By, (c) = Cosigned By      Initials Name Provider Type    Elba Cason, PT Physical Therapist     Natalee Spears RN Registered Nurse    SK Supriya Harris PT Student PT Student                                 Physical Therapy Education       Title: PT OT SLP Therapies (In Progress)       Topic: Physical Therapy (Done)       Point: Mobility training (Done)       Learning Progress Summary             Patient Acceptance, E,TB,D, VU by SK at 11/15/2023 1521    Acceptance, E, VU,NR by SK at 11/13/2023 1247    Acceptance, E,TB,D, VU,NR by  at 11/12/2023 1148                         Point: Home exercise program (Done)       Learning Progress Summary             Patient Acceptance, E,TB,D, VU by SK at 11/15/2023 1521    Acceptance, E,TB,D, VU,NR by  at 11/12/2023 1148                         Point: Body mechanics (Done)       Learning Progress Summary             Patient Acceptance, E,FLORIDA JULIEN, CONY by SK at  11/15/2023 1521    Acceptance, E, VU,NR by SK at 11/13/2023 1247    Acceptance, E,TB,D, VU,NR by  at 11/12/2023 1148                         Point: Precautions (Done)       Learning Progress Summary             Patient Acceptance, E,TB,D, VU by SK at 11/15/2023 1521    Acceptance, E, VU,NR by SK at 11/13/2023 1247    Acceptance, E,TB,D, VU,NR by  at 11/12/2023 1148                                         User Key       Initials Effective Dates Name Provider Type Discipline     06/16/21 -  Kim Almendarez, PT Physical Therapist PT    SK 10/10/23 -  Supriya Harris PT Student PT Student PT                  PT Recommendation and Plan     Plan of Care Reviewed With: patient  Outcome Evaluation: Pt presents in bed with nsg aid present. Performed bed mobility with min ax2 and was able to sit EOB with min/SBA. Pt ambulated from bed to chair and then 15' in the room with min HHAx2. Pt cont to demonstrate functional impairments of decreased activity tolerance and weakness. PT will continue to follow and progress activity as pt tolerates.     Time Calculation:         PT Charges       Row Name 11/15/23 1521             Time Calculation    Start Time 1445  -PC (r) SK (t) PC (c)      Stop Time 1500  -PC (r) SK (t) PC (c)      Time Calculation (min) 15 min  -PC (r) SK (t)      PT Received On 11/15/23  -PC (r) SK (t) PC (c)      PT - Next Appointment 11/16/23  -PC (r) SK (t) PC (c)         Time Calculation- PT    Total Timed Code Minutes- PT 12 minute(s)  -PC (r) SK (t) PC (c)         Timed Charges    56337 - PT Therapeutic Exercise Minutes 3  -PC (r) SK (t) PC (c)      69196 - PT Therapeutic Activity Minutes 9  -PC (r) SK (t) PC (c)         Total Minutes    Timed Charges Total Minutes 12  -PC (r) SK (t)       Total Minutes 12  -PC (r) SK (t)                User Key  (r) = Recorded By, (t) = Taken By, (c) = Cosigned By      Initials Name Provider Type    PC Elba Whaley, PT Physical Therapist    SK Kimberly  Supriya PT Student PT Student                  Therapy Charges for Today       Code Description Service Date Service Provider Modifiers Qty    83091807040 HC PT THERAPEUTIC ACT EA 15 MIN 11/15/2023 Supriya Harris, PT Student GP 1            PT G-Codes  Outcome Measure Options: AM-PAC 6 Clicks Basic Mobility (PT)  AM-PAC 6 Clicks Score (PT): 16  AM-PAC 6 Clicks Score (OT): 6  Modified Sunflower Scale: 4 - Moderately severe disability.  Unable to walk without assistance, and unable to attend to own bodily needs without assistance.  PT Discharge Summary  Anticipated Discharge Disposition (PT): skilled nursing facility, inpatient rehabilitation facility    Supriya Harris PT Student  11/15/2023

## 2023-11-15 NOTE — PROGRESS NOTES
AV wires removed without issue with nursing at bedside. Patient to remain on bed rest for 1 hour. Vital signs every 15 minutes x4.

## 2023-11-15 NOTE — PLAN OF CARE
Goal Outcome Evaluation:  Plan of Care Reviewed With: patient        Progress: improving  Outcome Evaluation: S/P aneurysm repair. NSR, P.D. continued with dosage change. Wires removed pt tolerated well. PT ambulated pt in room. Pt did not have an appetite today. Pt was moving more today and more awake. Will continue with current POC and update as needed.

## 2023-11-15 NOTE — PLAN OF CARE
Goal Outcome Evaluation:  Plan of Care Reviewed With: (P) patient           Outcome Evaluation: (P) Pt presents in bed with nsg aid present. Performed bed mobility with min ax2 and was able to sit EOB with min/SBA. Pt ambulated from bed to chair and then 15' in the room with min HHAx2. Pt cont to demonstrate functional impairments of decreased activity tolerance and weakness. PT will continue to follow and progress activity as pt tolerates.      Anticipated Discharge Disposition (PT): (P) skilled nursing facility, inpatient rehabilitation facility

## 2023-11-15 NOTE — THERAPY TREATMENT NOTE
Patient Name: Dee Herrera  : 1990    MRN: [id number]                              Today's Date: 11/15/2023       Admit Date: 10/26/2023    Visit Dx:     ICD-10-CM ICD-9-CM   1. Shortness of breath  R06.02 786.05   2. ESRD (end stage renal disease) on dialysis  N18.6 585.6    Z99.2 V45.11   3. Hyponatremia  E87.1 276.1   4. Generalized abdominal pain  R10.84 789.07   5. Elevated lactic acid level  R79.89 276.2   6. Elevated troponin  R79.89 790.6   7. Severe aortic valve regurgitation  I35.1 424.1   8. 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 syndrome    Systemic lupus erythematosus    Lupus nephritis, ISN/RPS class IV    Hypokalemia    Hypocalcemia    COVID-19    Hospital discharge follow-up    Stage 5 chronic kidney disease    Cardiac cirrhosis    Pancreatitis    Duodenitis    Regional enteritis of small bowel    Pericardial effusion    End stage renal disease on dialysis    Hemodialysis status    Seizure disorder    Elevated liver function tests    C. difficile colitis    Anemia, chronic disease    Essential hypertension    Peritoneal dialysis catheter in place    Anemia due to chronic kidney disease, on chronic dialysis    Alternating constipation and diarrhea    Abnormal stress test    Hyponatremia    Poor appetite    Moderate malnutrition    Chronic diastolic CHF (congestive heart failure)    Aortic valve lesion    Severe aortic valve regurgitation    Dissecting ascending aortic aneurysm     Past Medical History:   Diagnosis Date    Anasarca     PER CT SCAN    Dry skin     ESRD (end stage renal disease) on dialysis     TUES, THURS,  SAT UMAIR FRASER    History of abdominal pain     History of anemia     History of transfusion     Hypertension     Iron deficiency anemia 09/27/2021    Lupus (systemic lupus erythematosus) 07/30/2022    Migraine     Other specified nutritional anemias     Pericardial effusion     Renal insufficiency     Seizures     STATES LAST WAS 1/2023    Shortness of breath     OCCASIONAL    Vitamin D deficiency 09/27/2021     Past Surgical History:   Procedure Laterality Date    ASCENDING AORTIC ANEURYSM REPAIR W/ MECHANICAL AORTIC VALVE REPLACEMENT N/A 11/2/2023    Procedure: CHETNA STERNOTOMY, AORTIC ROOT REPLACEMENT WITH VALVE SPARING MISHEL PROCEDURE, REPLACEMENT OF ASCENDING AORTA, RIGHT FEMORAL DIALYSIS CATHETER PLACEMENT AND PRP;  Surgeon: Chris Medina MD;  Location: [facility] CVOR;  Service: Cardiothoracic;  Laterality: N/A;    CARDIAC CATHETERIZATION N/A 06/14/2023    Procedure: Coronary angiography;  Surgeon: Juana Taylor MD;  Location: [facility] CATH INVASIVE LOCATION;  Service: Cardiovascular;  Laterality: N/A;    CARDIAC CATHETERIZATION N/A 06/14/2023    Procedure: Left heart cath;  Surgeon: Juana Taylor MD;  Location: [facility] CATH INVASIVE LOCATION;  Service: Cardiovascular;  Laterality: N/A;    CARDIAC CATHETERIZATION N/A 06/14/2023    Procedure: Right Heart Cath;  Surgeon: Juana Taylor MD;  Location: [facility] CATH INVASIVE LOCATION;  Service: Cardiovascular;  Laterality: N/A;    COLONOSCOPY N/A 7/20/2023    Procedure: COLONOSCOPY to cecum with biopsy;  Surgeon: Drew Kaminski MD;  Location: [facility] ENDOSCOPY;  Service: Gastroenterology;  Laterality: N/A;  PRE - diarrhea, constipation  POST - fair prep, normal    CORONARY ARTERY BYPASS GRAFT N/A 11/6/2023    Procedure: STERNAL EXPLORATION AND WASH OUT;  Surgeon: Jr Mitesh Quiroz MD;  Location: [facility] CVOR;  Service: Cardiothoracic;  Laterality: N/A;    ENDOSCOPY N/A 7/20/2023    Procedure: ESOPHAGOGASTRODUODENOSCOPY with biopsy;  Surgeon: Gordy  MD Drew;  Location: [facility] ENDOSCOPY;  Service: Gastroenterology;  Laterality: N/A;  PRE - abn ct abd  POST - gastritis    INSERTION HEMODIALYSIS CATHETER N/A 07/26/2022    Procedure: RIGHT TUNNELED DIALYSIS CATHETER PLACEMENT;  Surgeon: Diandra Adhikari MD;  Location: [facility] MAIN OR;  Service: Vascular;  Laterality: N/A;    INSERTION PERITONEAL DIALYSIS CATHETER N/A 04/03/2023    Procedure: INSERTION PERITONEAL DIALYSIS CATHETER LAPAROSCOPIC, omentumpexy;  Surgeon: Jemal Loyola MD;  Location: [facility] OR;  Service: General;  Laterality: N/A;    TONSILLECTOMY        General Information       Row Name 11/15/23 1112          OT Time and Intention    Document Type therapy note (daily note)  -RB     Mode of Treatment individual therapy;occupational therapy  -RB       Row Name 11/15/23 1112          General Information    Patient Profile Reviewed yes  -RB       Row Name 11/15/23 1112          Cognition    Orientation Status (Cognition) unable/difficult to assess;verbal cues/prompts needed for orientation  -RB               User Key  (r) = Recorded By, (t) = Taken By, (c) = Cosigned By      Initials Name Provider Type    RB Waleska Duarte OT Occupational Therapist                     Mobility/ADL's       Row Name 11/15/23 1112          Bed Mobility    Bed Mobility scooting/bridging;sit-supine  -RB     Scooting/Bridging [place] (Bed Mobility) dependent (less than 25% patient effort);2 person assist;verbal cues;nonverbal cues (demo/gesture)  -RB     Sit-Supine [place] (Bed Mobility) dependent (less than 25% patient effort);2 person assist;verbal cues;nonverbal cues (demo/gesture)  -RB     Bed Mobility, Safety Issues cognitive deficits limit understanding;decreased use of legs for bridging/pushing;decreased use of arms for pushing/pulling;impaired trunk control for bed mobility  -RB     Assistive Device (Bed Mobility) bed rails  -RB     Comment, (Bed Mobility) Lethargic and kept her eyes  closed the entire session  -RB               User Key  (r) = Recorded By, (t) = Taken By, (c) = Cosigned By      Initials Name Provider Type    Waleska Tello OT Occupational Therapist                   Obj/Interventions       Row Name 11/15/23 1110          Shoulder (Therapeutic Exercise)    Shoulder (Therapeutic Exercise) PROM (passive range of motion)  -RB     Shoulder PROM (Therapeutic Exercise) bilateral;flexion;extension;aBduction;aDduction;external rotation;internal rotation;supine;10 repetitions  -RB       Row Name 11/15/23 1110          Elbow/Forearm (Therapeutic Exercise)    Elbow/Forearm (Therapeutic Exercise) PROM (passive range of motion)  -RB     Elbow/Forearm PROM (Therapeutic Exercise) bilateral;flexion;extension;supination;pronation;10 repetitions;supine  -       Row Name 11/15/23 1110          Wrist (Therapeutic Exercise)    Wrist (Therapeutic Exercise) PROM (passive range of motion)  -     Wrist AAROM (Therapeutic Exercise) flexion;extension  -     Wrist PROM (Therapeutic Exercise) flexion;bilateral;extension;10 repetitions  -       Row Name 11/15/23 1110          Hand (Therapeutic Exercise)    Hand (Therapeutic Exercise) PROM (passive range of motion)  -RB     Hand PROM (Therapeutic Exercise) bilateral;finger flexion;finger extension;10 repetitions  -       Row Name 11/15/23 1110          Balance    Comment, Balance On arrival to her room she was sitting at the EOB without any trunk support - pt appeared to be sleeping from sitting. No assist today for sitting balance, Max A for standing balance  -               User Key  (r) = Recorded By, (t) = Taken By, (c) = Cosigned By      Initials Name Provider Type    Waleska Tello OT Occupational Therapist                   Goals/Plan    No documentation.                  Clinical Impression       Row Name 11/15/23 1109          Pain Assessment    Pretreatment Pain Rating 0/10 - no pain  -RB     Posttreatment Pain Rating 0/10 -  no pain  -RB       Row Name 11/15/23 1109          Plan of Care Review    Plan of Care Reviewed With patient  -RB     Progress improving  -RB     Outcome Evaluation The pt was sitting at the EOB sleeping on arrival to her room this AM. She stood with Max A x1 and stood x15 seconds for a linen change. Max A x1 to scoot towards the HOB prior to returning to supine. She declined to participate in any ADL's. PROM provided to her arms - she kept her eyes closed and was unable to participate in any active participation.  -RB       Row Name 11/15/23 1109          Therapy Assessment/Plan (OT)    Rehab Potential (OT) good, to achieve stated therapy goals  -RB     Criteria for Skilled Therapeutic Interventions Met (OT) yes;skilled treatment is necessary  -RB     Therapy Frequency (OT) 5 times/wk  -RB       Row Name 11/15/23 1109          Therapy Plan Review/Discharge Plan (OT)    Anticipated Discharge Disposition (OT) skilled nursing facility;other (see comments)  Pending progress  -RB       Row Name 11/15/23 1109          Vital Signs    O2 Delivery Pre Treatment room air  -RB     O2 Delivery Intra Treatment room air  -RB     O2 Delivery Post Treatment room air  -RB     Pre Patient Position Sitting  -RB     Intra Patient Position Standing  -RB     Post Patient Position Supine  -RB       Row Name 11/15/23 1109          Positioning and Restraints    Pre-Treatment Position in bed  -RB     Post Treatment Position bed  -RB     In Bed notified nsg;supine;call light within reach;encouraged to call for assist;exit alarm on;fowlers  -RB               User Key  (r) = Recorded By, (t) = Taken By, (c) = Cosigned By      Initials Name Provider Type    RB Waleska Duarte, OT Occupational Therapist                   Outcome Measures       Row Name 11/15/23 0909          How much help from another person do you currently need...    Turning from your back to your side while in flat bed without using bedrails? 1  -KM     Moving from lying on  back to sitting on the side of a flat bed without bedrails? 1  -KM     Moving to and from a bed to a chair (including a wheelchair)? 1  -KM     Standing up from a chair using your arms (e.g., wheelchair, bedside chair)? 2  -KM     Climbing 3-5 steps with a railing? 1  -KM     To walk in hospital room? 2  -KM     AM-PAC 6 Clicks Score (PT) 8  -KM     Highest Level of Mobility Goal 3 --> Sit at edge of bed  -KM               User Key  (r) = Recorded By, (t) = Taken By, (c) = Cosigned By      Initials Name Provider Type    Natalee Canales, RN Registered Nurse                    Occupational Therapy Education       Title: PT OT SLP Therapies (In Progress)       Topic: Occupational Therapy (Not Started)       Point: ADL training (Not Started)       Description:   Instruct learner(s) on proper safety adaptation and remediation techniques during self care or transfers.   Instruct in proper use of assistive devices.                  Learner Progress:  Not documented in this visit.              Point: Home exercise program (Not Started)       Description:   Instruct learner(s) on appropriate technique for monitoring, assisting and/or progressing therapeutic exercises/activities.                  Learner Progress:  Not documented in this visit.              Point: Precautions (Not Started)       Description:   Instruct learner(s) on prescribed precautions during self-care and functional transfers.                  Learner Progress:  Not documented in this visit.              Point: Body mechanics (Not Started)       Description:   Instruct learner(s) on proper positioning and spine alignment during self-care, functional mobility activities and/or exercises.                  Learner Progress:  Not documented in this visit.                                  OT Recommendation and Plan  Planned Therapy Interventions (OT): activity tolerance training, BADL retraining, functional balance retraining, occupation/activity based  interventions, patient/caregiver education/training, transfer/mobility retraining, strengthening exercise, ROM/therapeutic exercise, adaptive equipment training  Therapy Frequency (OT): 5 times/wk  Plan of Care Review  Plan of Care Reviewed With: patient  Progress: improving  Outcome Evaluation: The pt was sitting at the EOB sleeping on arrival to her room this AM. She stood with Max A x1 and stood x15 seconds for a linen change. Max A x1 to scoot towards the HOB prior to returning to supine. She declined to participate in any ADL's. PROM provided to her arms - she kept her eyes closed and was unable to participate in any active participation.     Time Calculation:   Evaluation Complexity (OT)  Review Occupational Profile/Medical/Therapy History Complexity: extensive/high complexity  Assessment, Occupational Performance/Identification of Deficit Complexity: 5 or more performance deficits  Clinical Decision Making Complexity (OT): detailed assessment/moderate complexity  Overall Complexity of Evaluation (OT): high complexity     Time Calculation- OT       Row Name 11/15/23 1108             Time Calculation- OT    OT Start Time 0845  -RB      OT Stop Time 0908  -RB      OT Time Calculation (min) 23 min  -RB      Total Timed Code Minutes- OT 23 minute(s)  -RB      OT Received On 11/15/23  -RB      OT - Next Appointment 11/16/23  -RB         Timed Charges    57948 - OT Therapeutic Exercise Minutes 10  -RB      41174 - OT Therapeutic Activity Minutes 13  -RB         Total Minutes    Timed Charges Total Minutes 23  -RB       Total Minutes 23  -RB                User Key  (r) = Recorded By, (t) = Taken By, (c) = Cosigned By      Initials Name Provider Type    RB Waleska Duarte OT Occupational Therapist                  Therapy Charges for Today       Code Description Service Date Service Provider Modifiers Qty    57222852566  OT THER PROC EA 15 MIN 11/15/2023 Waleska Duarte OT GO 1    [id number]  OT  THERAPEUTIC ACT EA 15 MIN 11/15/2023 Waleska Duarte, OT GO 1                 Waleska Duarte OT  11/15/2023

## 2023-11-15 NOTE — PLAN OF CARE
"Goal Outcome Evaluation:              Outcome Evaluation: Patient sleeping when SLP arrived. Pt woke to verbal instruction, but shook her head \"no\" when SLP asked to re assess patient's swallow. Will continue to follow for diet upgrade.         "

## 2023-11-15 NOTE — DISCHARGE PLACEMENT REQUEST
"Luz Elena Herrera (33 y.o. Female)       Date of Birth   1990    Social Security Number       Address   [address] [place] [id number]    Home Phone   [phone]    MRN   [id number]       [employer]    Marital Status   Single                            Admission Date   10/26/23    Admission Type   Emergency    Admitting Provider   Mike Lezama MD    Attending Provider   Lakisha Hunt MD    Department, Room/Bed   [facility] CARDIOVASC UNIT, 2220/1       Discharge Date       Discharge Disposition       Discharge Destination                                 Attending Provider: Lakisha Hunt MD    Allergies: Minoxidil    Isolation: None   Infection: None   Code Status: CPR    Ht: 165 cm (64.96\")   Wt: 53.7 kg (118 lb 6.4 oz)    Admission Cmt: None   Principal Problem: Dissecting ascending aortic aneurysm [I71.010]                   Active Insurance as of 10/26/2023       Primary Coverage       Payor Plan Insurance Group Employer/Plan Group    MEDICARE MEDICARE A & B        Payor Plan Address Payor Plan Phone Number Payor Plan Fax Number Effective Dates    [address] [phone]  3/1/2023 - None Entered    [place] [id number]         Subscriber Name Subscriber Birth Date Member ID       LUZ ELENA HERRERA [place] 1990 [id number]                     Emergency Contacts        (Rel.) Home Phone Work Phone Mobile Phone    PAULINE HERRERA (Grandparent) -- -- [phone]    HERRERADANYELL NICOLAS (Mother) [phone] -- --                "

## 2023-11-15 NOTE — PROGRESS NOTES
Name: Dee Herrera ADMIT: 10/26/2023   : 1990  PCP: Margarita Woods APRN    MRN: [id number] LOS: 20 days   AGE/SEX: 33 y.o. female  ROOM: 0/     Subjective   Subjective   Continues with weakness and lethargy.  No chest pain.  No palpitation.  No ankle edema.  Positive cough productive of clear sputum.  No hemoptysis.  No fever or chills.    Review of Systems  GI.  No abdominal pain.  No nausea or vomiting.  Continues with very poor appetite and poor p.o. intake.    .  No dysuria or hematuria.  Minimal urine output.  CNS.  No seizures.  No focal neurological symptoms.  No loss of consciousness.     Objective   Objective   Vital Signs  Temp:  [98.6 °F (37 °C)-99.6 °F (37.6 °C)] 98.8 °F (37.1 °C)  Heart Rate:  [81-92] 81  Resp:  [16] 16  BP: (102-138)/(73-98) 118/73  SpO2:  [91 %-100 %] 99 %  on  Flow (L/min):  [3-33] 3;   Device (Oxygen Therapy): nasal cannula    Intake/Output Summary (Last 24 hours) at 11/15/2023 1305  Last data filed at 11/15/2023 1150  Gross per 24 hour   Intake 4330 ml   Output 3300 ml   Net 1030 ml     Body mass index is 19.73 kg/m².      23  0649 23  0616 11/15/23  0447   Weight: 61.1 kg (134 lb 11.2 oz) (Verified with RN) 62.4 kg (137 lb 9.1 oz) 53.7 kg (118 lb 6.4 oz)     Physical Exam  General.  Middle-aged female.  Alert and oriented x3.  Lethargic.  No apparent pain/distress/diaphoresis.  Appears depressed with flat affect.  eyes.  Pupils equal round and reactive.  Intact extraocular musculature.  No pallor or jaundice  Oral cavity.  Moist mucous membrane.  Neck.  Supple.  No JVD.  No lymphadenopathy or thyromegaly.  Cardiovascular.  Regular rate and rhythm with grade 2 systolic murmur.  Healthy sternotomy scar.    Chest.  Poor bilateral air entry with scattered bilateral rhonchi.  Abdomen.  Soft lax.  No tenderness.  No organomegaly.  No guarding or rebound.  Peritoneal catheter in place.  Extremities.  No clubbing/cyanosis/edema.  CNS.  No acute focal  "neurological deficits.    Results Review:      Results from last 7 days   Lab Units 11/15/23  0239 11/14/23  0308 11/13/23  0258 11/12/23  0359 11/11/23  0309 11/10/23  0311 11/09/23  0306   SODIUM mmol/L 130* 134* 133* 134* 136 135* 137   POTASSIUM mmol/L 3.3* 3.7 3.5 3.6 3.2* 3.4* 3.5   CHLORIDE mmol/L 98 100 99 99 104 104 105   CO2 mmol/L 20.7* 24.0 22.0 22.0 21.5* 22.0 22.2   BUN mg/dL 26* 21* 32* 21* 42* 24* 29*   CREATININE mg/dL 4.03* 3.31* 4.34* 3.16* 4.76* 3.55* 4.58*   GLUCOSE mg/dL 112* 124* 118* 106* 122* 117* 114*   CALCIUM mg/dL 8.8 8.9 8.5* 8.3* 8.4* 8.7 8.7   AST (SGOT) U/L  --  28  --   --   --   --  21   ALT (SGPT) U/L  --  11  --   --   --   --  <5     Estimated Creatinine Clearance: 17.1 mL/min (A) (by C-G formula based on SCr of 4.03 mg/dL (H)).      Results from last 7 days   Lab Units 11/15/23  1156 11/15/23  0600 11/15/23  0053 11/14/23  1602 11/14/23  1157 11/13/23  2354 11/13/23  1756 11/13/23  1604   GLUCOSE mg/dL 100 115 129 96 109 116 125 90             Results from last 7 days   Lab Units 11/13/23  0258   TSH uIU/mL 5.990*     Results from last 7 days   Lab Units 11/15/23  0239 11/14/23  0308 11/11/23  1643 11/11/23  0309 11/10/23  0311 11/09/23  0306   MAGNESIUM mg/dL 1.7 1.7  --  1.9  --  2.0   PHOSPHORUS mg/dL 2.7 2.0*   < > 1.4*   < > 2.8    < > = values in this interval not displayed.           Invalid input(s): \"LDLCALC\"  Results from last 7 days   Lab Units 11/15/23  0239 11/14/23 0308 11/13/23  0811 11/12/23  0359 11/11/23  0309 11/10/23  0311 11/09/23  0306   WBC 10*3/mm3 10.62 9.31 10.66 11.93* 9.87 9.25 8.95   HEMOGLOBIN g/dL 8.2* 7.8* 8.0* 8.0* 8.5* 8.7* 8.8*   HEMATOCRIT % 24.6* 24.2* 23.8* 23.9* 25.3* 26.3* 27.0*   PLATELETS 10*3/mm3 118* 120* 100* 93* 86* 87* 92*   MCV fL 86.0 87.4 85.0 84.5 85.8 86.5 86.8   MCH pg 28.7 28.2 28.6 28.3 28.8 28.6 28.3   MCHC g/dL 33.3 32.2 33.6 33.5 33.6 33.1 32.6   RDW % 14.6 14.8 15.1 15.2 15.4 15.5* 15.9*   RDW-SD fl 46.2 47.8 46.3 46.6 " 48.7 48.8 50.7   MPV fL 13.4*  --   --  13.3* 11.0 12.0 12.2*   NEUTROPHIL % % 84.2* 83.9*  --   --  82.1* 83.2* 82.7*   LYMPHOCYTE % % 3.6* 4.0*  --   --  4.0* 3.7* 3.4*   MONOCYTES % % 10.5 10.5  --   --  11.9 10.8 11.1   EOSINOPHIL % % 0.3 0.1*  --   --  0.2* 0.3 0.4   BASOPHIL % % 0.3 0.1  --   --  0.2 0.2 0.2   NEUTROS ABS 10*3/mm3 8.94* 7.81*  --   --  8.11* 7.69* 7.40*   LYMPHS ABS 10*3/mm3 0.38* 0.37*  --   --  0.39* 0.34* 0.30*   MONOS ABS 10*3/mm3 1.12* 0.98*  --   --  1.17* 1.00* 0.99*   EOS ABS 10*3/mm3 0.03 0.01  --   --  0.02 0.03 0.04   BASOS ABS 10*3/mm3 0.03 0.01  --   --  0.02 0.02 0.02         Results from last 7 days   Lab Units 11/13/23  0809 11/10/23  0736 11/09/23  1443 11/08/23  1409   PH, ARTERIAL pH units 7.527* 7.461* 7.499* 7.463*   PO2 ART mm Hg 73.0* 166.0* 158.3* 141.5*   PCO2, ARTERIAL mm Hg 25.8* 30.6* 29.9* 30.9*   HCO3 ART mmol/L 21.5* 21.9* 23.3 22.1     Results from last 7 days   Lab Units 11/11/23  0309   PROCALCITONIN ng/mL 5.05*         Results from last 7 days   Lab Units 11/14/23  0308   AMMONIA umol/L 21     Results from last 7 days   Lab Units 11/12/23  1924 11/12/23  1524   BLOODCX  No growth at 2 days No growth at 2 days                   Imaging:  Imaging Results (Last 24 Hours)       Procedure Component Value Units Date/Time    XR Chest 1 View [[id number]] Collected: 11/15/23 0634     Updated: 11/15/23 0731    Narrative:      PORTABLE CHEST X-RAY     HISTORY: Postop heart surgery.     TECHNIQUE: Portable chest x-ray from 5:20 a.m. is correlated with chest  x-ray from yesterday morning and other recent prior exams.     FINDINGS: Sternal wires with Dobbhoff tube having its tip at the level  of the proximal jejunum. Cardiomegaly appears similar. Bilateral pleural  effusions blunt the costophrenic angles and appear to be moderately  large on the left and small-to-moderate on the right; no change. Central  vascular engorgement. No pneumothorax.       Impression:      Central  vascular engorgement with bilateral pleural  effusions.     This report was finalized on 11/15/2023 7:28 AM by Dr. Jose Juan Aviles M.D on Workstation: [id number]                  I reviewed the patient's new clinical results / labs / tests / procedures      Assessment/Plan     Active Hospital Problems    Diagnosis  POA    **Dissecting ascending aortic aneurysm [I71.010]  Yes    Aortic valve lesion [I35.8]  Yes    Severe aortic valve regurgitation [I35.1]  Yes    Hyponatremia [E87.1]  Yes    Poor appetite [R63.0]  Yes    Moderate malnutrition [E44.0]  Yes    Chronic diastolic CHF (congestive heart failure) [I50.32]  Yes    Anemia due to chronic kidney disease, on chronic dialysis [N18.6, D63.1, Z99.2]  Not Applicable    Peritoneal dialysis catheter in place [Z99.2]  Not Applicable    Essential hypertension [I10]  Yes    End stage renal disease on dialysis [N18.6, Z99.2]  Not Applicable    Seizure disorder [G40.909]  Yes    Elevated liver function tests [R79.89]  Yes    Pericardial effusion [I31.39]  Yes    Systemic lupus erythematosus [M32.9]  Yes    Thrombocytopenia [D69.6]  Yes    Hypertension secondary to other renal disorders [I15.1]  Yes    Pancytopenia [D61.818]  Yes    Vitamin D deficiency [E55.9]  Yes      Resolved Hospital Problems    Diagnosis Date Resolved POA    Abdominal pain [R10.9] 10/28/2023 Yes           Ascending aortic aneurysm dissection s/p repair (postoperative day #13) complicated by cardiac tamponade status post reexploration for bleeding after removal of a large clot compressing right ventricle (postoperative day #8)/severe aortic valve insufficiency status postrepair in a patient with a history of hypertension/chronic combined congestive heart failure with an ejection fraction of 40%.  Stable cardiac status.  No evidence of tamponade/congestive heart failure/angina.  Blood pressure under good control.  Continue Norvasc/Coreg/losartan.  Chest x-ray with vascular congestion and I will give a  single dose of Lasix 40 mg IV x1..    Severe major depression.  Lexapro started yesterday.  [facility] Center on board.  Patient with poor motivation and she was counseled.  Feeding and p.o. intake.  Continues with poor appetite and poor p.o. intake.  On nutritional supplement.  Tolerating soft mechanical diet and thin liquid well.  Currently on cyclic Dobbhoff tube feeds.  Refused speech evaluation today.  History of SLE.  CellCept and Plaquenil on hold.  No synovitis.  Anemia and thrombocytopenia.  No active bleed.  Anemia is multifactorial and is secondary to medication and postoperative blood loss.  Thrombocytopenia has improved.  Thrombocytopenia mostly secondary to medications.  Continue observation.  Transfuse to keep hemoglobin more than 8.  Hb has been stable.  End-stage renal disease/hypokalemia/hyponatremia.  Euvolemic.  Nephrology following and the patient is on peritoneal dialysis.  Will need electrolyte management for nephrology.    History of seizure disorder.  CNS examination without focal deficit.  No seizures.  Continue Vimpat and Keppra.  EEG is negative for seizure.  Awaiting MRI of the brain.    VTE  prophylaxis.  Sequential compression devices.        Discussed my findings and plan of treatment with the patient/nurses  Disposition.  To be determined based on clinical course.  Eventually to skilled facility.        Lakisha Hunt MD  [facility]  11/15/23  13:05 EST

## 2023-11-15 NOTE — NURSING NOTE
Difficulty instilling dialysis solution. The solution is flowing, however it is extremely sluggish. Multiple attempts to reposition patient and system. Dr. Diaz notified, received orders for heparin in next solution and continue to monitor. If it is not better after the heparin, consider consulting general surgery to flush catheter.     At this time, will continue to instill current solution and will run heparin solution for next PD once filled by pharmacy.

## 2023-11-15 NOTE — PROGRESS NOTES
"DOS: 11/15/2023  NAME: Dee Herrera   : 1990  PCP: Margarita Woods APRN  Chief Complaint   Patient presents with    Shortness of Breath     Stroke    Subjective: No significant events or neuro change overnight. Appears withdrawn.    Objective:  Vital signs: /73 (BP Location: Left arm, Patient Position: Lying)   Pulse 81   Temp 98.8 °F (37.1 °C) (Oral)   Resp 16   Ht 165 cm (64.96\")   Wt 53.7 kg (118 lb 6.4 oz)   LMP 08/10/2022 (Approximate)   SpO2 99%   BMI 19.73 kg/m²       General appearance: Well developed, well nourished. Flat affect.    HEENT: Normocephalic.   Cardiac: Regular rate and rhythm.   Chest Exam: Clear to auscultation bilaterally, no wheezes, no rhonchi.  Extremities: Normal, no edema.   Skin: No rashes or birthmarks.      Higher integrative function: Oriented to month/year, and location. Follows simple commands.  Cranial nerves: Visual fields grossly intact, extraocular movements intact, PERRL.  Normal facial sensation, face symmetric.  Tongue midline.  Motor:Moving extremities symmetrically, at least 4/5. No abnormal movements.   Sensation: Intact to LT in all extremities.   Station and gait: Not assessed.   Patient reexamined, changes noted.     Scheduled Meds:amLODIPine, 10 mg, Oral, Q24H  [Held by provider] aspirin, 81 mg, Oral, Daily  carvedilol, 25 mg, Oral, Q12H  chlorhexidine, 15 mL, Mouth/Throat, Q12H  Delflex-LC/2.5% Dextrose, 2,000 mL, Intraperitoneal, 5 Exchanges Daily - Dwell Overnight  escitalopram, 10 mg, Oral, Daily  First Mouthwash (Magic Mouthwash), 10 mL, Swish & Spit, Q6H  [Held by provider] heparin (porcine), 5,000 Units, Subcutaneous, Q8H  insulin regular, 2-7 Units, Subcutaneous, Q6H  Lacosamide, 50 mg, Intravenous, Q12H  levETIRAcetam, 250 mg, Intravenous, Q12H  losartan, 25 mg, Oral, Q24H  mupirocin, , Each Nare, BID  pantoprazole, 40 mg, Intravenous, Q AM      Continuous Infusions:hold, 1 each      PRN Meds:.  acetaminophen **OR** acetaminophen " **OR** acetaminophen    bisacodyl    bisacodyl    dextrose    dextrose    glucagon (human recombinant)    heparin (porcine)    hold    hydrALAZINE    ipratropium-albuterol    [] Morphine **AND** naloxone    nitroglycerin    ondansetron    polyethylene glycol    Potassium Replacement - Follow Nurse / BPA Driven Protocol    Laboratory results:  Lab Results   Component Value Date    GLUCOSE 112 (H) 11/15/2023    CALCIUM 8.8 11/15/2023     (L) 11/15/2023    K 3.3 (L) 11/15/2023    CO2 20.7 (L) 11/15/2023    CL 98 11/15/2023    BUN 26 (H) 11/15/2023    CREATININE 4.03 (H) 11/15/2023    EGFRIFAFRI 107 2021    BCR 6.5 (L) 11/15/2023    ANIONGAP 11.3 11/15/2023     Lab Results   Component Value Date    WBC 10.62 11/15/2023    HGB 8.2 (L) 11/15/2023    HCT 24.6 (L) 11/15/2023    MCV 86.0 11/15/2023     (L) 11/15/2023     Lab Results   Component Value Date    CHOL 69 2021     Lab Results   Component Value Date    HDL 67 2022    HDL 30 (L) 2021     Lab Results   Component Value Date    LDL 44 2022    LDL 25 2021     Lab Results   Component Value Date    TRIG 202 (H) 2023    TRIG 48 2022    TRIG 57 2021          Review and interpretation of imaging:  XR Chest 1 View    Result Date: 11/15/2023  PORTABLE CHEST X-RAY  HISTORY: Postop heart surgery.  TECHNIQUE: Portable chest x-ray from 5:20 a.m. is correlated with chest x-ray from yesterday morning and other recent prior exams.  FINDINGS: Sternal wires with Dobbhoff tube having its tip at the level of the proximal jejunum. Cardiomegaly appears similar. Bilateral pleural effusions blunt the costophrenic angles and appear to be moderately large on the left and small-to-moderate on the right; no change. Central vascular engorgement. No pneumothorax.      Central vascular engorgement with bilateral pleural effusions.  This report was finalized on 11/15/2023 7:28 AM by Dr. Jose Juan Aviles M.D on Workstation:  [id number]      EEG    Result Date: 11/14/2023  Table formatting from the original result was not included. EEG Report          # Indication: Seizure disorder History: 33-year-old woman with history of seizures with complex partial seizures with secondary generalization.  Seizures have been controlled until status post aortic aneurysm dissection and repair followed by complex partial seizures with secondary generalization.  Previous EEGs have shown sharp waves Medical diagnoses: Multiple diagnoses including migraine, hypertension, lupus, end-stage renal disease on dialysis, anasarca Current Facility-Administered Medications Medication Dose Route Frequency Provider Last Rate Last Admin  acetaminophen (TYLENOL) tablet 650 mg  650 mg Oral Q4H PRN Shea Monteiro APRN      Or  acetaminophen (TYLENOL) 160 MG/5ML oral solution 650 mg  650 mg Oral Q4H PRN Shea Monteiro APRN   650 mg at 11/09/23 1815  Or  acetaminophen (TYLENOL) suppository 650 mg  650 mg Rectal Q4H PRN Shea Monteiro APRN      amLODIPine (NORVASC) tablet 10 mg  10 mg Oral Q24H Delia Stern APRN   10 mg at 11/14/23 1024  [Held by provider] aspirin chewable tablet 81 mg  81 mg Oral Daily Shea Monteiro APRN   81 mg at 11/03/23 0902  bisacodyl (DULCOLAX) EC tablet 10 mg  10 mg Oral Daily PRN Shea Monteiro APRN   10 mg at 11/08/23 2052  bisacodyl (DULCOLAX) suppository 10 mg  10 mg Rectal Daily PRN Shea Monteiro APRN      carvedilol (COREG) tablet 25 mg  25 mg Oral Q12H Delia Stern APRN   25 mg at 11/14/23 1024  chlorhexidine (PERIDEX) 0.12 % solution 15 mL  15 mL Mouth/Throat Q12H Shea Monteiro APRN   15 mL at 11/13/23 2035  dextrose (D50W) (25 g/50 mL) IV injection 25 g  25 g Intravenous Q15 Min PRN Delia Stern APRN   25 g at 11/08/23 0020  dextrose (GLUTOSE) oral gel 15 g  15 g Oral Q15 Min PRN Delia Stern APRN      escitalopram (LEXAPRO) tablet 10 mg  10 mg Oral Daily Lakisha Hunt MD      First Mouthwash (ic  Mouthwash) 10 mL  10 mL Swish & Spit Q6H Delia Stern APRN   10 mL at 11/13/23 2035  glucagon (GLUCAGEN) injection 1 mg  1 mg Intramuscular Q15 Min PRN Delia Stern APRN      [Held by provider] heparin (porcine) 5000 UNIT/ML injection 5,000 Units  5,000 Units Subcutaneous Q8H Shea Monteiro APRN      heparin (porcine) injection 3,000 Units  3,000 Units Intracatheter PRN Shea Monteiro APRN   3,000 Units at 11/13/23 1255  hydrALAZINE (APRESOLINE) injection 20 mg  20 mg Intravenous Q4H PRN Shea Monteiro APRN   20 mg at 11/09/23 1409  insulin regular (humuLIN R,novoLIN R) injection 2-7 Units  2-7 Units Subcutaneous Q6H Delia Stern APRN      ipratropium-albuterol (DUO-NEB) nebulizer solution 3 mL  3 mL Nebulization Q4H PRN Shea Monteiro APRN   3 mL at 11/13/23 0711  lacosamide (VIMPAT) injection 50 mg  50 mg Intravenous Q12H Shea Monteiro APRN   50 mg at 11/14/23 1128  levETIRAcetam (KEPPRA) injection 250 mg  250 mg Intravenous Q12H Vincent Hay MD   250 mg at 11/14/23 1024  losartan (COZAAR) tablet 25 mg  25 mg Oral Q24H Lakisha Hunt MD      mupirocin (BACTROBAN) 2 % nasal ointment   Each Nare BID Shea Monteiro APRN   1 application  at 11/13/23 2035  naloxone (NARCAN) injection 0.4 mg  0.4 mg Intravenous Q5 Min PRN Shea Monteiro APRN      nitroglycerin (NITROSTAT) SL tablet 0.4 mg  0.4 mg Sublingual Q5 Min PRN Shea Monteiro APRN      ondansetron (ZOFRAN) injection 4 mg  4 mg Intravenous Q6H PRN Shea Monteiro APRN   4 mg at 11/04/23 0027  pantoprazole (PROTONIX) injection 40 mg  40 mg Intravenous Q AM Delia Stern APRN   40 mg at 11/14/23 0638  polyethylene glycol (MIRALAX) packet 17 g  17 g Oral Daily PRN Shea Monteiro APRN      Potassium Replacement - Follow Nurse / BPA Driven Protocol   Does not apply PRN Shea Monteiro APRN      sodium chloride 0.9 % infusion  30 mL/hr Intravenous Continuous Shea Monteiro APRN 30 mL/hr at 11/02/23 1400 30 mL/hr at 11/02/23 1400   UltraBag/Dianeal/1.5% Dextrose low-elen (lucia #[id number]) (DIANEAL) peritoneal dialysate 2,000 mL  2,000 mL Intraperitoneal 4 Exchanges Daily - Dwell Overnight Isaac iDaz MD   2,000 mL at 11/14/23 1405 Time of study: 26 minutes 1 second Technical summary: The 10-20 system was used for electrode placement  Background: The background rhythm began with low amplitude beta activities more prominent in the occipital head regions.  Some intermittent 8 Hz activities were seen.  The patient seemed lethargic according to the technician but could perform some simple commands.  Sleep: The patient was drowsy during most of the study noted by diffusely slower activities.  There were several episodic periods of beta activity mostly in the occipital regions more prominent on the right.  This was not accompanied by any electro decrement subsequently.  Occasional vertex sharp transients were seen.  Hyperventilation: Not obtained  Photic stimulation: Not obtained  EKG: Sinus rhythm in the 90s  Video: Some head bobbing movements were seen early in the study but these did not correlate with any electrographic seizure activity. Impression: Essentially normal EEG during wakefulness and drowsiness.  No focal or epileptiform activities were seen. Dictated utilizing Dragon dictation.      XR Chest 1 View    Result Date: 11/14/2023  SINGLE VIEW OF THE CHEST  HISTORY: Postop open heart surgery  COMPARISON: November 13, 2023  FINDINGS: Weighted enteric feeding tube extends into the upper abdomen. Cardiomegaly and vascular congestion are noted. No pneumothorax is seen. There are bilateral effusions. There is bibasilar atelectasis.      No significant interval change.  This report was finalized on 11/14/2023 5:11 AM by Dr. Amparo Hodges M.D on Workstation: [id number]      XR Chest 1 View    Result Date: 11/13/2023  SINGLE VIEW OF THE CHEST  HISTORY: Postop open heart surgery  COMPARISON: November 11, 2023  FINDINGS: Right internal  jugular vein introducer has been removed. Weighted enteric feeding tube is again noted. There is vascular congestion. There is bibasilar atelectasis. Bilateral pleural effusions are noted. These do appear to have increased, particularly on the left.      Persistent vascular congestion and bilateral effusions. Left pleural effusion may have increased when compared to prior study.  This report was finalized on 11/13/2023 4:43 AM by Dr. Amparo Hodges M.D on Workstation: [id number]       Impression:  33-year-old female with hypertension, lupus, history of migraine, h/o seizure (previously seen by Dr Schmid with [facility], tapered off AEDs), and ESRD on PD prior to arrival who was admitted with 10/26 with shortness of breath and she ultimately underwent cardiothoracic surgery for aortic dissection.  Postoperatively she was seen by Dr. Guillen for seizures after she had several complex partial seizures with secondary generalization.  Continuous EEG showed sharp waves but no electrographic seizures.  Keppra and Vimpat were started initially on higher doses but then were changed to renal doses.  Neurology was reconsulted 11/13  due to persistent lethargy and generalized weakness.  Family reports this has been persistent since surgery but denied any clinical seizures since last seen by neurology.     W/U:   CT head 11/4: Stable 2 mm right-sided subdural hematoma, Near resolution of CSF density subdural hygroma over the right frontal lobe  EEG 11/14: normal during wakefulness/drowsiness, no focal or epileptiform activities seen  Labs: B12 1062, ammonia level 21, TSH 5.9     Diagnoses:  Encephalopathy, improving   ESRD  SLE   Epilepsy, focal onset with complex partial seizures  Status post aortic dissection repair  Suspected depression  The above impression statement reviewed and changes noted.      Plan:  F/U MRI brain. Still has pacing wires in place per RN.   Given depressed mood will stop keppra in case contributing,  will increase vimpat to 100 mg BID  Seen by access center, felt to be depressed but patient denied and declined psychiatry eval.  D/W Dr Gamino today. Will see intermittently, will place on seeing after MRI brain. Please call with questions in the interim.

## 2023-11-15 NOTE — PROGRESS NOTES
Hospital Follow Up    LOS:  LOS: 20 days   Patient Name: Dee Herrera  Age/Sex: 33 y.o. female  : 1990  MRN: [id number]    Day of Service: 11/15/23   Length of Stay: 20  Encounter Provider: CAESAR Damon  Place of Service: [facility] CARDIOLOGY  Patient Care Team:  Margarita Woods APRN as PCP - General (Nurse Practitioner)  Winnie Sanchez MD as Referring Physician (Obstetrics and Gynecology)  Norberto Almaraz MD PhD as Consulting Physician (Hematology and Oncology)  Lupis Thomas MD as Consulting Physician (Nephrology)  Hair Mckeon MD as Consulting Physician (Nephrology)  Juana Taylor MD as Consulting Physician (Cardiology)    Subjective:     Chief Complaint: follow up aortic aneurysm/dissection, severe AR    Interval History: No complaints fo chest pain or SOA. Says she is worn out.     Objective:     Objective:  Temp:  [98.6 °F (37 °C)-100.9 °F (38.3 °C)] 99.6 °F (37.6 °C)  Heart Rate:  [86-96] 86  Resp:  [16] 16  BP: (102-138)/(73-98) 122/73     Intake/Output Summary (Last 24 hours) at 11/15/2023 0903  Last data filed at 11/15/2023 0001  Gross per 24 hour   Intake 2323 ml   Output --   Net 2323 ml     Body mass index is 19.73 kg/m².      23  0649 23  0616 11/15/23  0447   Weight: 61.1 kg (134 lb 11.2 oz) (Verified with RN) 62.4 kg (137 lb 9.1 oz) 53.7 kg (118 lb 6.4 oz)     Weight change: -8.694 kg (-19 lb 2.7 oz)    Physical Exam:   General Appearance:    Awake alert and oriented, flat affect   Color:  Skin:  Neuro:  HEENT:    Lungs:     Pink  Warm and dry  No focal, motor or sensory deficits  Neck supple, pupils equal, round and reactive. No JVD, No Bruit  Clear to auscultation,respirations regular, even and                  unlabored    Heart:    Regular rate and rhythm, S1 and S2, + sys murmur, no gallop, no rub. No edema, DP/PT pulses are 2+   Chest Wall:    Midsternal incision clean and dry   Abdomen:     Normal bowel  "sounds, no masses, no organomegaly, soft        non-tender, non-distended, no guarding, no ascites noted   Extremities:   Moves all extremities well, no edema, no cyanosis, no redness       Lab Review:   Results from last 7 days   Lab Units 11/15/23  0239 11/14/23  0308 11/10/23  0311 11/09/23  0306   SODIUM mmol/L 130* 134*   < > 137   POTASSIUM mmol/L 3.3* 3.7   < > 3.5   CHLORIDE mmol/L 98 100   < > 105   CO2 mmol/L 20.7* 24.0   < > 22.2   BUN mg/dL 26* 21*   < > 29*   CREATININE mg/dL 4.03* 3.31*   < > 4.58*   GLUCOSE mg/dL 112* 124*   < > 114*   CALCIUM mg/dL 8.8 8.9   < > 8.7   AST (SGOT) U/L  --  28  --  21   ALT (SGPT) U/L  --  11  --  <5    < > = values in this interval not displayed.         Results from last 7 days   Lab Units 11/15/23  0239 11/14/23  0308   WBC 10*3/mm3 10.62 9.31   HEMOGLOBIN g/dL 8.2* 7.8*   HEMATOCRIT % 24.6* 24.2*   PLATELETS 10*3/mm3 118* 120*         Results from last 7 days   Lab Units 11/15/23  0239 11/14/23  0308   MAGNESIUM mg/dL 1.7 1.7           Invalid input(s): \"LDLCALC\"      Results from last 7 days   Lab Units 11/13/23  0258   TSH uIU/mL 5.990*     I reviewed the patient's new clinical results.  I personally viewed and interpreted the patient's EKG  Current Medications:   Scheduled Meds:amLODIPine, 10 mg, Oral, Q24H  [Held by provider] aspirin, 81 mg, Oral, Daily  carvedilol, 25 mg, Oral, Q12H  chlorhexidine, 15 mL, Mouth/Throat, Q12H  escitalopram, 10 mg, Oral, Daily  First Mouthwash (Magic Mouthwash), 10 mL, Swish & Spit, Q6H  [Held by provider] heparin (porcine), 5,000 Units, Subcutaneous, Q8H  insulin regular, 2-7 Units, Subcutaneous, Q6H  Lacosamide, 50 mg, Intravenous, Q12H  levETIRAcetam, 250 mg, Intravenous, Q12H  losartan, 25 mg, Oral, Q24H  mupirocin, , Each Nare, BID  pantoprazole, 40 mg, Intravenous, Q AM  UltraBag/Dianeal/1.5% Dextrose low-elen (lucia #[id number]), 2,000 mL, Intraperitoneal, 4 Exchanges Daily - Dwell Overnight      Continuous Infusions:sodium " chloride, 30 mL/hr, Last Rate: 30 mL/hr (11/02/23 1400)        Allergies:  Allergies   Allergen Reactions    Minoxidil Other (See Comments) and Hives     Pericardial effusion .       Assessment:       Dissecting ascending aortic aneurysm    Vitamin D deficiency    Thrombocytopenia    Hypertension secondary to other renal disorders    Pancytopenia    Systemic lupus erythematosus    Pericardial effusion    End stage renal disease on dialysis    Seizure disorder    Elevated liver function tests    Essential hypertension    Peritoneal dialysis catheter in place    Anemia due to chronic kidney disease, on chronic dialysis    Hyponatremia    Poor appetite    Moderate malnutrition    Chronic diastolic CHF (congestive heart failure)    Aortic valve lesion    Severe aortic valve regurgitation        Plan:   Ascending aortic aneurysm with subacute/chronic aortic root dissection: s/p repair and proximal aortic replacement on 11/2.   Severe aortic regurgitation: s/p repair on 11/2.   Cardiac tamponade: secondary to pericardial thrombus anteriorly and compressing right ventricle. S/p reexploration with clot removal and treatment of a small amount of bleeding at the suture line on 11/6.  Cardiogenic shock: due to #3. Resolved  Seizures: postoperatively. EEG yesterday normal. 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 On PD at home. HD postoperatively, last session on 11/13. Now switched back to PD.  Hyponatremia: resolved  HTN: blood pressure well controlled. On amlodipine and carvedilol. Losartan added yesterday.  Chronic anemia: stable  SLE  Depression: Access center has seen patient. At this point, she denies any depression. She was started on escitalopram.      -plans for MRI, possibly today.  -patient is stable from a cardiac standpoint. No changes at this time.        CAESAR Damon  11/15/23  09:03 EST  Electronically signed by CAESAR Damon, 11/15/23, 9:03 AM EST.

## 2023-11-15 NOTE — PROGRESS NOTES
Nutrition Services    Patient Name:  Dee Herrera  YOB: 1990  MRN: [id number]  Admit Date:  10/26/2023    Assessment Date:  11/15/23     CALORIE COUNT PROGRESS NOTE    Current TF's: Novasource Renal @ 35 ml/hr (goal 35 ml/hr) w/ 30 ml q 4 hrs free water flushes via cortrak (ND) to run from 8p-8a (12 hours/day).  +BM 11/16    Patient with minimal PO intake.  Did not eat breakfast or lunch yesterday or today.  Sleeping soundly at time of attempted visit.  Discussed with RN.    Do not this patient will meet needs via PO intake alone.  Recommend consideration of PEG placement.    RD to follow up tomorrow for day 2 results.    Calorie Count Data           Day 1 187 kcal (12% estimated needs) / 8 grams protein (15% estimated needs)   Day 2    Day 3    Average Intake      Current Nutrition Orders            Food Allergies NKFA   Current PO Diet Diet: Regular/House Diet, Fluid Restriction (240 mL/tray) Diets; 1000 mL/day; Feeding Assistance - Nursing; Texture: Soft to Chew (NDD 3); Soft to Chew: Chopped Meat; Fluid Consistency: Thin (IDDSI 0)   Supplement    Current EN/PN  Route: ND  Order: Novasource Renal @ 35 mL/hr from 8p-8a (12 hours/day)     Estimated Requirements         Calories [id number] (30-35 kcal/kg)    Protein 52-78 (1.0 gm/kg, 1.5 gm/kg)    Fluid 1 mL/kcal     RD to follow up per protocol.    Electronically signed by:  Shell Horn RD  11/15/23 15:21 EST

## 2023-11-15 NOTE — NURSING NOTE
"Access Center follow-up regarding depression.  Per RN patient has bee flat and withdrawn, not participating in PT, speech and not eating or getting out of bed.  Upon entering room, she is lying in bed, eyes closed and barely opens them.  She reports feeling \"groggy\".  She reports she has eaten \"a little\".  She denies SI.  She denies depression and anxiety.  She does not engage in conversation.  She was started on Lexapro per attending.    Access following.     "

## 2023-11-16 ENCOUNTER — APPOINTMENT (OUTPATIENT)
Dept: ULTRASOUND IMAGING | Facility: HOSPITAL | Age: 33
DRG: 219 | End: 2023-11-16
Payer: MEDICARE

## 2023-11-16 ENCOUNTER — APPOINTMENT (OUTPATIENT)
Dept: GENERAL RADIOLOGY | Facility: HOSPITAL | Age: 33
DRG: 219 | End: 2023-11-16
Payer: MEDICARE

## 2023-11-16 LAB
ALBUMIN SERPL-MCNC: 2.2 G/DL (ref 3.5–5.2)
ANION GAP SERPL CALCULATED.3IONS-SCNC: 9.1 MMOL/L (ref 5–15)
BASOPHILS # BLD AUTO: 0.02 10*3/MM3 (ref 0–0.2)
BASOPHILS NFR BLD AUTO: 0.2 % (ref 0–1.5)
BH BB BLOOD EXPIRATION DATE: NORMAL
BH BB BLOOD TYPE BARCODE: 5100
BH BB DISPENSE STATUS: NORMAL
BH BB PRODUCT CODE: NORMAL
BH BB UNIT NUMBER: NORMAL
BUN SERPL-MCNC: 28 MG/DL (ref 6–20)
BUN/CREAT SERPL: 6.2 (ref 7–25)
CALCIUM SPEC-SCNC: 8.4 MG/DL (ref 8.6–10.5)
CHLORIDE SERPL-SCNC: 96 MMOL/L (ref 98–107)
CO2 SERPL-SCNC: 24.9 MMOL/L (ref 22–29)
CREAT SERPL-MCNC: 4.52 MG/DL (ref 0.57–1)
CROSSMATCH INTERPRETATION: NORMAL
DEPRECATED RDW RBC AUTO: 46.7 FL (ref 37–54)
EGFRCR SERPLBLD CKD-EPI 2021: 12.7 ML/MIN/1.73
EOSINOPHIL # BLD AUTO: 0.06 10*3/MM3 (ref 0–0.4)
EOSINOPHIL NFR BLD AUTO: 0.5 % (ref 0.3–6.2)
ERYTHROCYTE [DISTWIDTH] IN BLOOD BY AUTOMATED COUNT: 14.7 % (ref 12.3–15.4)
GLUCOSE BLDC GLUCOMTR-MCNC: 111 MG/DL (ref 70–130)
GLUCOSE BLDC GLUCOMTR-MCNC: 154 MG/DL (ref 70–130)
GLUCOSE BLDC GLUCOMTR-MCNC: 263 MG/DL (ref 70–130)
GLUCOSE BLDC GLUCOMTR-MCNC: 51 MG/DL (ref 70–130)
GLUCOSE BLDC GLUCOMTR-MCNC: 84 MG/DL (ref 70–130)
GLUCOSE SERPL-MCNC: 130 MG/DL (ref 65–99)
HCT VFR BLD AUTO: 26.8 % (ref 34–46.6)
HGB BLD-MCNC: 8.9 G/DL (ref 12–15.9)
LYMPHOCYTES # BLD AUTO: 0.38 10*3/MM3 (ref 0.7–3.1)
LYMPHOCYTES NFR BLD AUTO: 3.3 % (ref 19.6–45.3)
MAGNESIUM SERPL-MCNC: 1.5 MG/DL (ref 1.6–2.6)
MCH RBC QN AUTO: 28.5 PG (ref 26.6–33)
MCHC RBC AUTO-ENTMCNC: 33.2 G/DL (ref 31.5–35.7)
MCV RBC AUTO: 85.9 FL (ref 79–97)
MONOCYTES # BLD AUTO: 1 10*3/MM3 (ref 0.1–0.9)
MONOCYTES NFR BLD AUTO: 8.7 % (ref 5–12)
NEUTROPHILS NFR BLD AUTO: 86.4 % (ref 42.7–76)
NEUTROPHILS NFR BLD AUTO: 9.92 10*3/MM3 (ref 1.7–7)
PHOSPHATE SERPL-MCNC: 2.9 MG/DL (ref 2.5–4.5)
PLATELET # BLD AUTO: 136 10*3/MM3 (ref 140–450)
POTASSIUM SERPL-SCNC: 3 MMOL/L (ref 3.5–5.2)
RBC # BLD AUTO: 3.12 10*6/MM3 (ref 3.77–5.28)
SODIUM SERPL-SCNC: 130 MMOL/L (ref 136–145)
UNIT  ABO: NORMAL
UNIT  RH: NORMAL
WBC NRBC COR # BLD: 11.48 10*3/MM3 (ref 3.4–10.8)

## 2023-11-16 PROCEDURE — 0W993ZZ DRAINAGE OF RIGHT PLEURAL CAVITY, PERCUTANEOUS APPROACH: ICD-10-PCS | Performed by: INTERNAL MEDICINE

## 2023-11-16 PROCEDURE — C9254 INJECTION, LACOSAMIDE: HCPCS | Performed by: NURSE PRACTITIONER

## 2023-11-16 PROCEDURE — 25010000002 LACOSAMIDE 200 MG/20ML SOLUTION: Performed by: NURSE PRACTITIONER

## 2023-11-16 PROCEDURE — 76942 ECHO GUIDE FOR BIOPSY: CPT

## 2023-11-16 PROCEDURE — 85025 COMPLETE CBC W/AUTO DIFF WBC: CPT | Performed by: INTERNAL MEDICINE

## 2023-11-16 PROCEDURE — 25010000002 MAGNESIUM SULFATE IN D5W 1G/100ML (PREMIX) 1-5 GM/100ML-% SOLUTION: Performed by: INTERNAL MEDICINE

## 2023-11-16 PROCEDURE — 83735 ASSAY OF MAGNESIUM: CPT | Performed by: INTERNAL MEDICINE

## 2023-11-16 PROCEDURE — 82948 REAGENT STRIP/BLOOD GLUCOSE: CPT

## 2023-11-16 PROCEDURE — 97110 THERAPEUTIC EXERCISES: CPT

## 2023-11-16 PROCEDURE — 71045 X-RAY EXAM CHEST 1 VIEW: CPT

## 2023-11-16 PROCEDURE — 63710000001 INSULIN REGULAR HUMAN PER 5 UNITS: Performed by: NURSE PRACTITIONER

## 2023-11-16 PROCEDURE — 80069 RENAL FUNCTION PANEL: CPT | Performed by: INTERNAL MEDICINE

## 2023-11-16 PROCEDURE — 99024 POSTOP FOLLOW-UP VISIT: CPT | Performed by: THORACIC SURGERY (CARDIOTHORACIC VASCULAR SURGERY)

## 2023-11-16 RX ORDER — LIDOCAINE HYDROCHLORIDE 10 MG/ML
10 INJECTION, SOLUTION INFILTRATION; PERINEURAL ONCE
Status: DISCONTINUED | OUTPATIENT
Start: 2023-11-16 | End: 2023-12-09 | Stop reason: HOSPADM

## 2023-11-16 RX ORDER — MAGNESIUM SULFATE 1 G/100ML
1 INJECTION INTRAVENOUS
Status: DISCONTINUED | OUTPATIENT
Start: 2023-11-16 | End: 2023-11-16

## 2023-11-16 RX ORDER — SODIUM CHLORIDE, SODIUM LACTATE, CALCIUM CHLORIDE, MAGNESIUM CHLORIDE AND DEXTROSE 4.25; 538; 448; 18.3; 5.08 G/100ML; MG/100ML; MG/100ML; MG/100ML; MG/100ML
2000 INJECTION, SOLUTION INTRAPERITONEAL
Status: DISCONTINUED | OUTPATIENT
Start: 2023-11-16 | End: 2023-11-16

## 2023-11-16 RX ORDER — MAGNESIUM SULFATE 1 G/100ML
1 INJECTION INTRAVENOUS
Status: COMPLETED | OUTPATIENT
Start: 2023-11-16 | End: 2023-11-16

## 2023-11-16 RX ORDER — POTASSIUM CHLORIDE 750 MG/1
40 TABLET, FILM COATED, EXTENDED RELEASE ORAL ONCE
Status: COMPLETED | OUTPATIENT
Start: 2023-11-16 | End: 2023-11-16

## 2023-11-16 RX ORDER — DEXTROSE MONOHYDRATE, SODIUM CHLORIDE, SODIUM LACTATE, CALCIUM CHLORIDE, MAGNESIUM CHLORIDE 4.25; 538; 448; 18.4; 5.08 G/100ML; MG/100ML; MG/100ML; MG/100ML; MG/100ML
2000 SOLUTION INTRAPERITONEAL
Status: DISCONTINUED | OUTPATIENT
Start: 2023-11-16 | End: 2023-11-18

## 2023-11-16 RX ADMIN — MAGNESIUM SULFATE IN DEXTROSE 1 G: 10 INJECTION, SOLUTION INTRAVENOUS at 13:22

## 2023-11-16 RX ADMIN — DEXTROSE MONOHYDRATE, SODIUM CHLORIDE, SODIUM LACTATE, CALCIUM CHLORIDE, MAGNESIUM CHLORIDE 2000 ML: 4.25; 538; 448; 18.4; 5.08 SOLUTION INTRAPERITONEAL at 18:14

## 2023-11-16 RX ADMIN — AMLODIPINE BESYLATE 10 MG: 10 TABLET ORAL at 07:32

## 2023-11-16 RX ADMIN — DEXTROSE MONOHYDRATE, SODIUM CHLORIDE, SODIUM LACTATE, CALCIUM CHLORIDE, MAGNESIUM CHLORIDE 2000 ML: 4.25; 538; 448; 18.4; 5.08 SOLUTION INTRAPERITONEAL at 13:34

## 2023-11-16 RX ADMIN — DEXTROSE MONOHYDRATE, SODIUM CHLORIDE, SODIUM LACTATE, CALCIUM CHLORIDE, MAGNESIUM CHLORIDE 2000 ML: 2.5; 538; 448; 18.4; 5.08 SOLUTION INTRAPERITONEAL at 06:09

## 2023-11-16 RX ADMIN — LACOSAMIDE 100 MG: 10 INJECTION INTRAVENOUS at 13:22

## 2023-11-16 RX ADMIN — CARVEDILOL 25 MG: 25 TABLET, FILM COATED ORAL at 07:32

## 2023-11-16 RX ADMIN — LOSARTAN POTASSIUM 25 MG: 25 TABLET, FILM COATED ORAL at 07:33

## 2023-11-16 RX ADMIN — DIPHENHYDRAMINE HYDROCHLORIDE AND LIDOCAINE HYDROCHLORIDE AND ALUMINUM HYDROXIDE AND MAGNESIUM HYDRO 10 ML: KIT at 07:33

## 2023-11-16 RX ADMIN — MAGNESIUM SULFATE IN DEXTROSE 1 G: 10 INJECTION, SOLUTION INTRAVENOUS at 14:58

## 2023-11-16 RX ADMIN — ESCITALOPRAM OXALATE 10 MG: 10 TABLET, FILM COATED ORAL at 07:33

## 2023-11-16 RX ADMIN — INSULIN HUMAN 4 UNITS: 100 INJECTION, SOLUTION PARENTERAL at 17:11

## 2023-11-16 RX ADMIN — PANTOPRAZOLE SODIUM 40 MG: 40 INJECTION, POWDER, FOR SOLUTION INTRAVENOUS at 06:09

## 2023-11-16 RX ADMIN — POTASSIUM CHLORIDE 40 MEQ: 750 TABLET, EXTENDED RELEASE ORAL at 13:22

## 2023-11-16 NOTE — PROGRESS NOTES
" LOS: 21 days   Patient Care Team:  Margarita Woods APRN as PCP - General (Nurse Practitioner)  Winnie Sanchez MD as Referring Physician (Obstetrics and Gynecology)  Norberto Almaraz MD PhD as Consulting Physician (Hematology and Oncology)  Lupis Thomas MD as Consulting Physician (Nephrology)  Hair Mckeon MD as Consulting Physician (Nephrology)  Juana Taylor MD as Consulting Physician (Cardiology)    Chief Complaint: post op    Subjective:  Symptoms:  No shortness of breath, cough or chest pain.    Diet:  Poor intake.  No nausea or vomiting.    Activity level: Impaired due to weakness.    Pain:  She complains of pain that is mild.  Pain is well controlled.          Vital Signs  Temp:  [97.5 °F (36.4 °C)-99.6 °F (37.6 °C)] 97.8 °F (36.6 °C)  Heart Rate:  [77-89] 89  Resp:  [16] 16  BP: (102-130)/(66-87) 102/66  Body mass index is 18.68 kg/m².    Intake/Output Summary (Last 24 hours) at 11/16/2023 0747  Last data filed at 11/15/2023 2300  Gross per 24 hour   Intake 8240 ml   Output 9400 ml   Net -1160 ml     No intake/output data recorded.          11/14/23  0616 11/15/23  0447 11/16/23  0657   Weight: 62.4 kg (137 lb 9.1 oz) 53.7 kg (118 lb 6.4 oz) 50.8 kg (112 lb 1.6 oz)         Objective:  General Appearance:  Comfortable and in no acute distress.    Vital signs: (most recent): Blood pressure 102/66, pulse 90, temperature 99.3 °F (37.4 °C), temperature source Oral, resp. rate 16, height 165 cm (64.96\"), weight 50.8 kg (112 lb 1.6 oz), last menstrual period 08/10/2022, SpO2 100%, not currently breastfeeding.  Vital signs are normal.  No fever.    Output: No urine output and producing stool.    Lungs:  Normal effort and normal respiratory rate.  There are decreased breath sounds.    Heart: Normal rate.  Regular rhythm.  (SR on tele monitor)  Abdomen: Abdomen is soft.  Bowel sounds are normal.     Pulses: Distal pulses are intact.    Neurological: Patient is oriented to person, place and " "time.  (drowsy).    Skin:  Warm and dry.  (Sternal dressing clean, dry, and intact)          Results Review:        WBC WBC   Date Value Ref Range Status   11/16/2023 11.48 (H) 3.40 - 10.80 10*3/mm3 Final   11/15/2023 10.62 3.40 - 10.80 10*3/mm3 Final   11/14/2023 9.31 3.40 - 10.80 10*3/mm3 Final   11/13/2023 10.66 3.40 - 10.80 10*3/mm3 Final      HGB Hemoglobin   Date Value Ref Range Status   11/16/2023 8.9 (L) 12.0 - 15.9 g/dL Final   11/15/2023 8.2 (L) 12.0 - 15.9 g/dL Final   11/14/2023 7.8 (L) 12.0 - 15.9 g/dL Final   11/13/2023 8.0 (L) 12.0 - 15.9 g/dL Final      HCT Hematocrit   Date Value Ref Range Status   11/16/2023 26.8 (L) 34.0 - 46.6 % Final   11/15/2023 24.6 (L) 34.0 - 46.6 % Final   11/14/2023 24.2 (L) 34.0 - 46.6 % Final   11/13/2023 23.8 (L) 34.0 - 46.6 % Final      Platelets Platelets   Date Value Ref Range Status   11/16/2023 136 (L) 140 - 450 10*3/mm3 Final   11/15/2023 118 (L) 140 - 450 10*3/mm3 Final   11/14/2023 120 (L) 140 - 450 10*3/mm3 Final   11/13/2023 100 (L) 140 - 450 10*3/mm3 Final        PT/INR:  No results found for: \"PROTIME\"/No results found for: \"INR\"    Sodium Sodium   Date Value Ref Range Status   11/16/2023 130 (L) 136 - 145 mmol/L Final   11/15/2023 130 (L) 136 - 145 mmol/L Final   11/14/2023 134 (L) 136 - 145 mmol/L Final      Potassium Potassium   Date Value Ref Range Status   11/16/2023 3.0 (L) 3.5 - 5.2 mmol/L Final   11/15/2023 3.3 (L) 3.5 - 5.2 mmol/L Final   11/14/2023 3.7 3.5 - 5.2 mmol/L Final      Chloride Chloride   Date Value Ref Range Status   11/16/2023 96 (L) 98 - 107 mmol/L Final   11/15/2023 98 98 - 107 mmol/L Final   11/14/2023 100 98 - 107 mmol/L Final      Bicarbonate CO2   Date Value Ref Range Status   11/16/2023 24.9 22.0 - 29.0 mmol/L Final   11/15/2023 20.7 (L) 22.0 - 29.0 mmol/L Final   11/14/2023 24.0 22.0 - 29.0 mmol/L Final      BUN BUN   Date Value Ref Range Status   11/16/2023 28 (H) 6 - 20 mg/dL Final   11/15/2023 26 (H) 6 - 20 mg/dL Final "   11/14/2023 21 (H) 6 - 20 mg/dL Final      Creatinine Creatinine   Date Value Ref Range Status   11/16/2023 4.52 (H) 0.57 - 1.00 mg/dL Final   11/15/2023 4.03 (H) 0.57 - 1.00 mg/dL Final   11/14/2023 3.31 (H) 0.57 - 1.00 mg/dL Final      Calcium Calcium   Date Value Ref Range Status   11/16/2023 8.4 (L) 8.6 - 10.5 mg/dL Final   11/15/2023 8.8 8.6 - 10.5 mg/dL Final   11/14/2023 8.9 8.6 - 10.5 mg/dL Final      Magnesium Magnesium   Date Value Ref Range Status   11/16/2023 1.5 (L) 1.6 - 2.6 mg/dL Final   11/15/2023 1.7 1.6 - 2.6 mg/dL Final   11/14/2023 1.7 1.6 - 2.6 mg/dL Final          amLODIPine, 10 mg, Oral, Q24H  [Held by provider] aspirin, 81 mg, Oral, Daily  carvedilol, 25 mg, Oral, Q12H  chlorhexidine, 15 mL, Mouth/Throat, Q12H  Delflex-LC/2.5% Dextrose, 2,000 mL, Intraperitoneal, 5 Exchanges Daily - Dwell Overnight  escitalopram, 10 mg, Oral, Daily  First Mouthwash (Magic Mouthwash), 10 mL, Swish & Spit, Q6H  [Held by provider] heparin (porcine), 5,000 Units, Subcutaneous, Q8H  insulin regular, 2-7 Units, Subcutaneous, Q6H  Lacosamide, 100 mg, Intravenous, Q12H  losartan, 25 mg, Oral, Q24H  mupirocin, , Each Nare, BID  pantoprazole, 40 mg, Intravenous, Q AM      hold, 1 each      Assessment & Plan    - Ascending aortic aneurysm with dissection- s/p ascending aortic dissection repair/proximal replacement, gacron interposition graft, kelli procedure; insertion of right femoral shiley---POD#14; Pagni  - Cardiac tamponade- reexploration for bleeding, removal of large clot compressing the RV---POD#10; Camporrotondo  - severe aortic valve insufficiency  -E ncephalopathy, improving   - ESRD on PD  - Lupus--on Cellcept/plaquenil; currently held  - Epilepsy  - chronic immunosuppression  - hypertension  - chronic anemia  - TCP--chronic; lovenox on hold  - Depression--started on lexapro 11/14     She is very unmotivated, not eating. Refused to work with speech.  Not doing her PD dialysis. She is very withdrawn. Access  was consulted and she has been started on lexapro.   CXR with increasing vascular congestion and she is now on 2L NC when she was previously on RA. Plan for thoracentesis today.   Remains on nocturnal feeds--calorie count in progress. She is not eating at all during the day. Calorie count ending today and she will be reassessed by nutrition. Likely restart continuous TFs.   Remains in SR; AV wires removed yesterday. HR/BP stable.   OK for MRI of the brain.   On PD--per nursing she is not participating at all. We are wondering if she should be back on HD--await nephrology's input.   She is very weak. Continue aggressive PT/OT  Continue supportive care    Shea Monteiro, APRN  11/16/23  07:47 EST

## 2023-11-16 NOTE — PROGRESS NOTES
[facility] Progress Note      Patient Name: Dee Herrera  : 1990  MRN: [id number]  Primary Care Physician:  Margarita Woods APRN  Date of admission: 10/26/2023    Subjective     Interval History:   Follow-up end-stage renal disease, patient was on peritoneal dialysis but switched to hemodialysis in the hospital    The patient is tolerating her peritoneal dialysis yesterday we used 2.5% solution she had about 1600 cc ultrafiltrate.  But her chest x-ray this morning revealed evidence of increasing vascular congestion, the patient is somewhat groggy she was just given pain medication.        Review of Systems:   As noted above    Objective     Vitals:   Temp:  [97.5 °F (36.4 °C)-99.3 °F (37.4 °C)] 99.3 °F (37.4 °C)  Heart Rate:  [77-90] 90  Resp:  [16] 16  BP: (102-130)/(66-87) 102/66  Flow (L/min):  [2-3] 2    Intake/Output Summary (Last 24 hours) at 2023 0842  Last data filed at 2023 0700  Gross per 24 hour   Intake 18975 ml   Output 58163 ml   Net -1385 ml       Physical Exam:    General Appearance: Groggy, arousable, chronically ill and frail  Skin: warm and dry  HEENT: Core track in place  Neck: No JVD  Lungs: Scattered rhonchi, unlabored breathing effort  Heart: RRR, no rub   Abdomen: soft, no guarding nondistended, normoactive bowels and PD catheter in place with clean exit site  Extremities: no edema.  Neuro: Moving all extremities    Scheduled Meds:     amLODIPine, 10 mg, Oral, Q24H  [Held by provider] aspirin, 81 mg, Oral, Daily  carvedilol, 25 mg, Oral, Q12H  chlorhexidine, 15 mL, Mouth/Throat, Q12H  Delflex-LC/2.5% Dextrose, 2,000 mL, Intraperitoneal, 5 Exchanges Daily - Dwell Overnight  escitalopram, 10 mg, Oral, Daily  First Mouthwash (Magic Mouthwash), 10 mL, Swish & Spit, Q6H  [Held by provider] heparin (porcine), 5,000 Units, Subcutaneous, Q8H  insulin regular, 2-7 Units, Subcutaneous, Q6H  Lacosamide, 100 mg, Intravenous, Q12H  losartan, 25 mg,  Oral, Q24H  magnesium sulfate, 1 g, Intravenous, Q1H  mupirocin, , Each Nare, BID  pantoprazole, 40 mg, Intravenous, Q AM  potassium chloride, 40 mEq, Oral, Once  UltraBag/Dianeal/4.25% Dextrose low-elen (lucia #[id number]), 2,000 mL, Intraperitoneal, 5 Exchanges Daily - Dwell Overnight      IV Meds:   hold, 1 each      Results Reviewed:   I have personally reviewed the results from the time of this admission to 11/16/2023 08:42 EST     Results from last 7 days   Lab Units 11/16/23  0242 11/15/23  0239 11/14/23  0308   SODIUM mmol/L 130* 130* 134*   POTASSIUM mmol/L 3.0* 3.3* 3.7   CHLORIDE mmol/L 96* 98 100   CO2 mmol/L 24.9 20.7* 24.0   BUN mg/dL 28* 26* 21*   CREATININE mg/dL 4.52* 4.03* 3.31*   CALCIUM mg/dL 8.4* 8.8 8.9   BILIRUBIN mg/dL  --   --  0.4   ALK PHOS U/L  --   --  73   ALT (SGPT) U/L  --   --  11   AST (SGOT) U/L  --   --  28   GLUCOSE mg/dL 130* 112* 124*       Estimated Creatinine Clearance: 14.5 mL/min (A) (by C-G formula based on SCr of 4.52 mg/dL (H)).    Results from last 7 days   Lab Units 11/16/23  0242 11/15/23  0239 11/14/23  0308   MAGNESIUM mg/dL 1.5* 1.7 1.7   PHOSPHORUS mg/dL 2.9 2.7 2.0*             Results from last 7 days   Lab Units 11/16/23  0242 11/15/23  0239 11/14/23  0308 11/13/23  0811 11/12/23  0359   WBC 10*3/mm3 11.48* 10.62 9.31 10.66 11.93*   HEMOGLOBIN g/dL 8.9* 8.2* 7.8* 8.0* 8.0*   PLATELETS 10*3/mm3 136* 118* 120* 100* 93*               Assessment / Plan     ASSESSMENT:  End-stage renal disease secondary to lupus nephritis who was on peritoneal dialysis, he was switched temporarily to hemodialysis postoperatively and temporary dialysis catheter was removed yesterday and PD was resumed.  Used the 2.5% dialysate yesterday with good UF but the patient has evidence of central volume excess with increased pulmonary vascular congestion, her sodium is 130 is related to hypervolemic hyponatremia and potassium is 3.  The magnesium is 1.5.  I will change the dialysate today 4 to  4.25% solution hoping that will improve the volume status.    Cardiomyopathy ejection fraction about 40%  Thrombocytopenia, platelet today is [id number]  Anemia of chronic kidney disease hemoglobin today is 8.9, her ferritin is very high related to inflammation and has other parameters suggestive of iron deficiency and also chronic disease  SLE  Mitral and aortic valve insufficiency with DeBakey type I dissection which is felt to be either subacute or chronic, she underwent repair.  She then developed cardiac tamponade and had reexploration done with removal of large clot compressing the right ventricle  Seizure disorder  Hypertension with ESRD, stable    PLAN:  Change dialysate to 4.25% solution  Replete potassium and magnesium  Continue fluid restriction 1000 cc per 24 hours  Will not give iron nor YULIANA since it will be ineffective in the presence of inflammation  Surveillance labs    I reviewed the chart and other providers note, I reviewed imaging and lab data.  Copied text in this note has been reviewed and is accurate as of 11/16/23.     Thank you for involving us in the care of Dee Herrera.  Please feel free to call with any questions.    Isaac Diaz MD  11/16/23  08:42 [facility], [place]    [facility]  [phone]    Please note that portions of this note were completed with a voice recognition program.

## 2023-11-16 NOTE — THERAPY TREATMENT NOTE
Patient Name: Dee Herrera  : 1990    MRN: [id number]                              Today's Date: 2023       Admit Date: 10/26/2023    Visit Dx:     ICD-10-CM ICD-9-CM   1. Shortness of breath  R06.02 786.05   2. ESRD (end stage renal disease) on dialysis  N18.6 585.6    Z99.2 V45.11   3. Hyponatremia  E87.1 276.1   4. Generalized abdominal pain  R10.84 789.07   5. Elevated lactic acid level  R79.89 276.2   6. Elevated troponin  R79.89 790.6   7. Severe aortic valve regurgitation  I35.1 424.1   8. 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 syndrome    Systemic lupus erythematosus    Lupus nephritis, ISN/RPS class IV    Hypokalemia    Hypocalcemia    COVID-19    Hospital discharge follow-up    Stage 5 chronic kidney disease    Cardiac cirrhosis    Pancreatitis    Duodenitis    Regional enteritis of small bowel    Pericardial effusion    End stage renal disease on dialysis    Hemodialysis status    Seizure disorder    Elevated liver function tests    C. difficile colitis    Anemia, chronic disease    Essential hypertension    Peritoneal dialysis catheter in place    Anemia due to chronic kidney disease, on chronic dialysis    Alternating constipation and diarrhea    Abnormal stress test    Hyponatremia    Poor appetite    Moderate malnutrition    Chronic diastolic CHF (congestive heart failure)    Aortic valve lesion    Severe aortic valve regurgitation    Dissecting ascending aortic aneurysm     Past Medical History:   Diagnosis Date    Anasarca     PER CT SCAN    Dry skin     ESRD (end stage renal disease) on dialysis     TUES, THURS,  SAT UMAIR FRASER    History of abdominal pain     History of anemia     History of transfusion     Hypertension     Iron deficiency anemia 09/27/2021    Lupus (systemic lupus erythematosus) 07/30/2022    Migraine     Other specified nutritional anemias     Pericardial effusion     Renal insufficiency     Seizures     STATES LAST WAS 1/2023    Shortness of breath     OCCASIONAL    Vitamin D deficiency 09/27/2021     Past Surgical History:   Procedure Laterality Date    ASCENDING AORTIC ANEURYSM REPAIR W/ MECHANICAL AORTIC VALVE REPLACEMENT N/A 11/2/2023    Procedure: CHETNA STERNOTOMY, AORTIC ROOT REPLACEMENT WITH VALVE SPARING MISHEL PROCEDURE, REPLACEMENT OF ASCENDING AORTA, RIGHT FEMORAL DIALYSIS CATHETER PLACEMENT AND PRP;  Surgeon: Chris Medina MD;  Location: [facility] CV[facility];  Service: Cardiothoracic;  Laterality: N/A;    CARDIAC CATHETERIZATION N/A 06/14/2023    Procedure: Coronary angiography;  Surgeon: Juana Taylor MD;  Location: [facility] CATH INVASIVE LOCATION;  Service: Cardiovascular;  Laterality: N/A;    CARDIAC CATHETERIZATION N/A 06/14/2023    Procedure: Left heart cath;  Surgeon: Juana Taylor MD;  Location: [facility] CATH INVASIVE LOCATION;  Service: Cardiovascular;  Laterality: N/A;    CARDIAC CATHETERIZATION N/A 06/14/2023    Procedure: Right Heart Cath;  Surgeon: Juana Taylor MD;  Location: [facility] CATH INVASIVE LOCATION;  Service: Cardiovascular;  Laterality: N/A;    COLONOSCOPY N/A 7/20/2023    Procedure: COLONOSCOPY to cecum with biopsy;  Surgeon: Drew Kaminski MD;  Location: [facility] ENDOSCOPY;  Service: Gastroenterology;  Laterality: N/A;  PRE - diarrhea, constipation  POST - fair prep, normal    CORONARY ARTERY BYPASS GRAFT N/A 11/6/2023    Procedure: STERNAL EXPLORATION AND WASH OUT;  Surgeon: Jr Mitesh Quiroz MD;  Location: [facility] CV[facility];  Service: Cardiothoracic;  Laterality: N/A;    ENDOSCOPY N/A 7/20/2023    Procedure: ESOPHAGOGASTRODUODENOSCOPY with biopsy;  Surgeon: Gordy  MD Drew;  Location: [facility] ENDOSCOPY;  Service: Gastroenterology;  Laterality: N/A;  PRE - abn ct abd  POST - gastritis    INSERTION HEMODIALYSIS CATHETER N/A 07/26/2022    Procedure: RIGHT TUNNELED DIALYSIS CATHETER PLACEMENT;  Surgeon: Diandra Adhikari MD;  Location: [facility] MAIN OR;  Service: Vascular;  Laterality: N/A;    INSERTION PERITONEAL DIALYSIS CATHETER N/A 04/03/2023    Procedure: INSERTION PERITONEAL DIALYSIS CATHETER LAPAROSCOPIC, omentumpexy;  Surgeon: Jemal Loyola MD;  Location: [facility] MAIN OR;  Service: General;  Laterality: N/A;    TONSILLECTOMY        General Information       Row Name 11/16/23 1614          Physical Therapy Time and Intention    Document Type therapy note (daily note)  -PC     Mode of Treatment physical therapy  -PC       Row Name 11/16/23 1614          General Information    Existing Precautions/Restrictions fall;sternal;cardiac  -PC       Row Name 11/16/23 1614          Cognition    Orientation Status (Cognition) unable/difficult to assess  -PC       Row Name 11/16/23 1614          Safety Issues, Functional Mobility    Impairments Affecting Function (Mobility) balance;coordination;endurance/activity tolerance;motor control;strength;shortness of breath;postural/trunk control;pain  -PC               User Key  (r) = Recorded By, (t) = Taken By, (c) = Cosigned By      Initials Name Provider Type    PC Elba Whaley, PT Physical Therapist                   Mobility       Row Name 11/16/23 1614          Bed Mobility    Bed Mobility supine-sit;rolling right  -PC     Rolling Right [place] (Bed Mobility) minimum assist (75% patient effort);verbal cues;nonverbal cues (demo/gesture);2 person assist  -PC     Supine-Sit [place] (Bed Mobility) minimum assist (75% patient effort);verbal cues;nonverbal cues (demo/gesture);2 person assist  -PC       Row Name 11/16/23 1614          Sit-Stand Transfer    Sit-Stand [place] (Transfers) verbal cues;nonverbal cues  (demo/gesture);minimum assist (75% patient effort);2 person assist  -PC     Assistive Device (Sit-Stand Transfers) other (see comments)  [facility]  -       Row Name 11/16/23 1614          Gait/Stairs (Locomotion)    [place] Level (Gait) verbal cues;nonverbal cues (demo/gesture);minimum assist (75% patient effort);2 person assist  -PC     Assistive Device (Gait) other (see comments)  [facility]  -     Distance in Feet (Gait) 40 ft  -PC     Deviations/Abnormal Patterns (Gait) amrita decreased;festinating/shuffling;gait speed decreased;stride length decreased  -PC     Bilateral Gait Deviations heel strike decreased  -PC               User Key  (r) = Recorded By, (t) = Taken By, (c) = Cosigned By      Initials Name Provider Type    PC Elba Whaley, PT Physical Therapist                   Obj/Interventions    No documentation.                  Goals/Plan    No documentation.                  Clinical Impression       Row Name 11/16/23 1615          Pain    Pretreatment Pain Rating 0/10 - no pain  -PC       Row Name 11/16/23 1615          Plan of Care Review    Plan of Care Reviewed With patient  -PC     Progress improving  -PC     Outcome Evaluation Pt cont to be lethargic, often keeping eyes closed, with little verbalizations,  but does not resist activity, allows us to lead her and to walk, pt walked with HHA, min assist x 2 for 40 ft today, PT will cont to progress as tolerated  -PC       Row Name 11/16/23 1615          Positioning and Restraints    Pre-Treatment Position in bed  -PC     Post Treatment Position chair  -PC     In Chair reclined;call light within reach;encouraged to call for assist;exit alarm on;notified nsg  -PC               User Key  (r) = Recorded By, (t) = Taken By, (c) = Cosigned By      Initials Name Provider Type    PC Elba Whaley, PT Physical Therapist                   Outcome Measures       Row Name 11/16/23 1617 11/16/23 0700       How much help from another person do you currently  need...    Turning from your back to your side while in flat bed without using bedrails? 2  -PC 3  -KM    Moving from lying on back to sitting on the side of a flat bed without bedrails? 2  -PC 3  -KM    Moving to and from a bed to a chair (including a wheelchair)? 2  -PC 3  -KM    Standing up from a chair using your arms (e.g., wheelchair, bedside chair)? 2  -PC 2  -KM    Climbing 3-5 steps with a railing? 1  -PC 1  -KM    To walk in hospital room? 2  -PC 2  -KM    AM-PAC 6 Clicks Score (PT) 11  -PC 14  -KM    Highest Level of Mobility Goal 4 --> Transfer to chair/commode  -PC 4 --> Transfer to chair/commode  -KM              User Key  (r) = Recorded By, (t) = Taken By, (c) = Cosigned By      Initials Name Provider Type    Elba Cason, PT Physical Therapist     Natalee Spears, RN Registered Nurse                                 Physical Therapy Education       Title: PT OT SLP Therapies (In Progress)       Topic: Physical Therapy (In Progress)       Point: Mobility training (In Progress)       Learning Progress Summary             Patient Acceptance, E,D, NR by  at 11/16/2023 1618    Acceptance, E,TB,D, VU by SK at 11/15/2023 1521    Acceptance, E, VU,NR by SK at 11/13/2023 1247    Acceptance, E,TB,D, VU,NR by  at 11/12/2023 1148                         Point: Home exercise program (In Progress)       Learning Progress Summary             Patient Acceptance, E,D, NR by  at 11/16/2023 1618    Acceptance, E,TB,D, VU by SK at 11/15/2023 1521    Acceptance, E,TB,D, VU,NR by  at 11/12/2023 1148                         Point: Body mechanics (In Progress)       Learning Progress Summary             Patient Acceptance, E,D, NR by  at 11/16/2023 1618    Acceptance, E,TB,D, VU by SK at 11/15/2023 1521    Acceptance, E, VU,NR by SK at 11/13/2023 1247    Acceptance, E,TB,D, VU,NR by  at 11/12/2023 1148                         Point: Precautions (In Progress)       Learning Progress Summary              Patient Acceptance, E,D, NR by  at 11/16/2023 1618    Acceptance, E,TB,D, VU by SK at 11/15/2023 1521    Acceptance, E, VU,NR by SK at 11/13/2023 1247    Acceptance, E,TB,D, VU,NR by  at 11/12/2023 1148                                         User Key       Initials Effective Dates Name Provider Type Discipline     06/16/21 -  Kim Almendarez, PT Physical Therapist PT     06/16/21 -  Elba Whaley, PT Physical Therapist PT    SK 10/10/23 -  Supriya Harris PT Student PT Student PT                  PT Recommendation and Plan     Plan of Care Reviewed With: patient  Progress: improving  Outcome Evaluation: Pt cont to be lethargic, often keeping eyes closed, with little verbalizations,  but does not resist activity, allows us to lead her and to walk, pt walked with HHA, min assist x 2 for 40 ft today, PT will cont to progress as tolerated     Time Calculation:         PT Charges       Row Name 11/16/23 1618             Time Calculation    Start Time 1530  -PC      Stop Time 1553  -PC      Time Calculation (min) 23 min  -PC      PT Received On 11/16/23  -PC      PT - Next Appointment 11/17/23  -PC                User Key  (r) = Recorded By, (t) = Taken By, (c) = Cosigned By      Initials Name Provider Type    PC Elba Whaley, PT Physical Therapist                  Therapy Charges for Today       Code Description Service Date Service Provider Modifiers Qty    [id number] HC PT THER PROC EA 15 MIN 11/16/2023 Elba Whaley, PT GP 2            PT G-Codes  Outcome Measure Options: AM-PAC 6 Clicks Basic Mobility (PT)  AM-PAC 6 Clicks Score (PT): 11  AM-PAC 6 Clicks Score (OT): 6  Modified Louisa Scale: 4 - Moderately severe disability.  Unable to walk without assistance, and unable to attend to own bodily needs without assistance.       Elba Whaley, HYUN  11/16/2023

## 2023-11-16 NOTE — PROGRESS NOTES
Patient in radiology for thoracentesis this morning.  Since she has been stable from a cardiac standpoint will plan on seeing again tomorrow.

## 2023-11-16 NOTE — NURSING NOTE
Access center follow up regarding depression and flat affect. Pt admitted for AAA repair. Nursing reports pt was somewhat more active yesterday and ambulated more. No further changes reported. Access following for support.

## 2023-11-16 NOTE — CASE MANAGEMENT/SOCIAL WORK
Continued Stay Note  [facility]     Patient Name: Dee Herrera  MRN: [id number]  Today's Date: 11/16/2023    Admit Date: 10/26/2023    Plan: [facility] pending medical appropriateness.  They will follow up after Pt next video swallow study.   Discharge Plan       Row Name 11/16/23 1537       Plan    Plan [facility] pending medical appropriateness.  They will follow up after Pt next video swallow study.    Plan Comments CCP spoke with Cel[facility] and she advised she is following and will f/u with CCP next week.  CCP following.............Елена SALAZAR/SULMA CM                   Discharge Codes    No documentation.                 Expected Discharge Date and Time       Expected Discharge Date Expected Discharge Time    Nov 17, 2023               Елена Brown RN

## 2023-11-16 NOTE — PLAN OF CARE
Goal Outcome Evaluation:  Plan of Care Reviewed With: patient        Progress: improving  Outcome Evaluation: Pt cont to be lethargic, often keeping eyes closed, with little verbalizations,  but does not resist activity, allows us to lead her and to walk, pt walked with HHA, min assist x 2 for 40 ft today, PT will cont to progress as tolerated

## 2023-11-16 NOTE — SIGNIFICANT NOTE
11/16/23 1200   OTHER   Discipline occupational therapist   Rehab Time/Intention   Session Not Performed other (see comments)  (Kept eyes closed, unable to participate with OT.)   Recommendation   OT - Next Appointment 11/17/23

## 2023-11-16 NOTE — PROGRESS NOTES
Name: Dee Herrera ADMIT: 10/26/2023   : 1990  PCP: Margarita Woods APRN    MRN: [id number] LOS: 21 days   AGE/SEX: 33 y.o. female  ROOM: [facility]/     Subjective   Subjective   No overnight events.  No change in the situation.  Continues to have no motivation but starting to eat a bit more.  Speech recommended G-tube feeds if the patient continues with poor p.o. intake.  No fever or chills.  No chest pain.  No shortness of breath.  No nausea or vomiting.     Objective   Objective   Vital Signs  Temp:  [97.5 °F (36.4 °C)-99.3 °F (37.4 °C)] 99.1 °F (37.3 °C)  Heart Rate:  [77-90] 86  Resp:  [16] 16  BP: (100-130)/(61-87) 100/61  SpO2:  [98 %-100 %] 98 %  on  Flow (L/min):  [2] 2;   Device (Oxygen Therapy): room air    Intake/Output Summary (Last 24 hours) at 2023 1831  Last data filed at 2023 1416  Gross per 24 hour   Intake 8105 ml   Output 8500 ml   Net -395 ml     Body mass index is 18.68 kg/m².      23  0616 11/15/23  0447 23  0657   Weight: 62.4 kg (137 lb 9.1 oz) 53.7 kg (118 lb 6.4 oz) 50.8 kg (112 lb 1.6 oz)     Physical Exam  General.  Middle-aged female.  Alert and oriented x3.  Lethargic.  No apparent pain/distress/diaphoresis.  Appears depressed with flat affect.  eyes.  Pupils equal round and reactive.  Intact extraocular musculature.  No pallor or jaundice  Oral cavity.  Moist mucous membrane.  Neck.  Supple.  No JVD.  No lymphadenopathy or thyromegaly.  Cardiovascular.  Regular rate and rhythm with grade 2 systolic murmur.  Healthy sternotomy scar.    Chest.  Poor bilateral air entry with scattered bilateral rhonchi.  Abdomen.  Soft lax.  No tenderness.  No organomegaly.  No guarding or rebound.  Peritoneal catheter in place.  Extremities.  No clubbing/cyanosis/edema.  CNS.  No acute focal neurological deficits.  No change in exam from yesterday.    Results Review:      Results from last 7 days   Lab Units 23  0242 11/15/23  0239 23  0305  "11/13/23  0258 11/12/23  0359 11/11/23  0309 11/10/23  0311   SODIUM mmol/L 130* 130* 134* 133* 134* 136 135*   POTASSIUM mmol/L 3.0* 3.3* 3.7 3.5 3.6 3.2* 3.4*   CHLORIDE mmol/L 96* 98 100 99 99 104 104   CO2 mmol/L 24.9 20.7* 24.0 22.0 22.0 21.5* 22.0   BUN mg/dL 28* 26* 21* 32* 21* 42* 24*   CREATININE mg/dL 4.52* 4.03* 3.31* 4.34* 3.16* 4.76* 3.55*   GLUCOSE mg/dL 130* 112* 124* 118* 106* 122* 117*   CALCIUM mg/dL 8.4* 8.8 8.9 8.5* 8.3* 8.4* 8.7   AST (SGOT) U/L  --   --  28  --   --   --   --    ALT (SGPT) U/L  --   --  11  --   --   --   --      Estimated Creatinine Clearance: 14.5 mL/min (A) (by C-G formula based on SCr of 4.52 mg/dL (H)).      Results from last 7 days   Lab Units 11/16/23  1616 11/16/23  1126 11/16/23  0637 11/15/23  1953 11/15/23  1644 11/15/23  1156 11/15/23  0600 11/15/23  0053   GLUCOSE mg/dL 263* 84 111 146* 176* 100 115 129             Results from last 7 days   Lab Units 11/13/23  0258   TSH uIU/mL 5.990*     Results from last 7 days   Lab Units 11/16/23  0242 11/15/23  0239 11/14/23  0308 11/11/23  1643 11/11/23  0309   MAGNESIUM mg/dL 1.5* 1.7 1.7  --  1.9   PHOSPHORUS mg/dL 2.9 2.7 2.0*   < > 1.4*    < > = values in this interval not displayed.           Invalid input(s): \"LDLCALC\"  Results from last 7 days   Lab Units 11/16/23  0242 11/15/23  0239 11/14/23  0308 11/13/23  0811 11/12/23  0359 11/11/23  0309 11/10/23  0311   WBC 10*3/mm3 11.48* 10.62 9.31 10.66 11.93* 9.87 9.25   HEMOGLOBIN g/dL 8.9* 8.2* 7.8* 8.0* 8.0* 8.5* 8.7*   HEMATOCRIT % 26.8* 24.6* 24.2* 23.8* 23.9* 25.3* 26.3*   PLATELETS 10*3/mm3 136* 118* 120* 100* 93* 86* 87*   MCV fL 85.9 86.0 87.4 85.0 84.5 85.8 86.5   MCH pg 28.5 28.7 28.2 28.6 28.3 28.8 28.6   MCHC g/dL 33.2 33.3 32.2 33.6 33.5 33.6 33.1   RDW % 14.7 14.6 14.8 15.1 15.2 15.4 15.5*   RDW-SD fl 46.7 46.2 47.8 46.3 46.6 48.7 48.8   MPV fL  --  13.4*  --   --  13.3* 11.0 12.0   NEUTROPHIL % % 86.4* 84.2* 83.9*  --   --  82.1* 83.2*   LYMPHOCYTE % % 3.3* " 3.6* 4.0*  --   --  4.0* 3.7*   MONOCYTES % % 8.7 10.5 10.5  --   --  11.9 10.8   EOSINOPHIL % % 0.5 0.3 0.1*  --   --  0.2* 0.3   BASOPHIL % % 0.2 0.3 0.1  --   --  0.2 0.2   NEUTROS ABS 10*3/mm3 9.92* 8.94* 7.81*  --   --  8.11* 7.69*   LYMPHS ABS 10*3/mm3 0.38* 0.38* 0.37*  --   --  0.39* 0.34*   MONOS ABS 10*3/mm3 1.00* 1.12* 0.98*  --   --  1.17* 1.00*   EOS ABS 10*3/mm3 0.06 0.03 0.01  --   --  0.02 0.03   BASOS ABS 10*3/mm3 0.02 0.03 0.01  --   --  0.02 0.02         Results from last 7 days   Lab Units 11/13/23  0809 11/10/23  0736   PH, ARTERIAL pH units 7.527* 7.461*   PO2 ART mm Hg 73.0* 166.0*   PCO2, ARTERIAL mm Hg 25.8* 30.6*   HCO3 ART mmol/L 21.5* 21.9*     Results from last 7 days   Lab Units 11/11/23  0309   PROCALCITONIN ng/mL 5.05*         Results from last 7 days   Lab Units 11/14/23  0308   AMMONIA umol/L 21     Results from last 7 days   Lab Units 11/12/23  1924 11/12/23  1524   BLOODCX  No growth at 3 days No growth at 4 days                   Imaging:  Imaging Results (Last 24 Hours)       Procedure Component Value Units Date/Time    US Thoracentesis [[id number]] Collected: 11/16/23 1018    Specimen: Body Fluid Updated: 11/16/23 1021    Narrative:      ULTRASOUND-GUIDED RIGHT THORACENTESIS     HISTORY: Pleural effusion     COMPARISON: Chest x-ray from today     The risks, benefits and alternatives of the procedure were discussed  with the patient and informed consent was obtained. Prior to the  procedure ultrasound of the right chest was performed to evaluate the  pleural effusion. However, only very minimal pleural fluid is present,  insufficient for thoracentesis.          Impression:      Very minimal pleural fluid, insufficient for thoracentesis           This report was finalized on 11/16/2023 10:18 AM by Dr. Payam Phillips M.D on Workstation: [id number]       XR Chest 1 View [[id number]] Collected: 11/16/23 0516     Updated: 11/16/23 0520    Narrative:      SINGLE VIEW OF THE CHEST      HISTORY: Postop heart surgery     COMPARISON: November 15, 2023     FINDINGS:  Weighted enteric feeding tube extends into the upper abdomen. There is  increasing vascular congestion. Dense left basilar consolidation is  unchanged. There is some right basilar atelectasis. There are bilateral  effusions. No pneumothorax is seen.       Impression:      Worsening vascular congestion.     This report was finalized on 11/16/2023 5:16 AM by Dr. Amparo Hodges M.D on Workstation: [id number]                  I reviewed the patient's new clinical results / labs / tests / procedures      Assessment/Plan     Active Hospital Problems    Diagnosis  POA    **Dissecting ascending aortic aneurysm [I71.010]  Yes    Aortic valve lesion [I35.8]  Yes    Severe aortic valve regurgitation [I35.1]  Yes    Hyponatremia [E87.1]  Yes    Poor appetite [R63.0]  Yes    Moderate malnutrition [E44.0]  Yes    Chronic diastolic CHF (congestive heart failure) [I50.32]  Yes    Anemia due to chronic kidney disease, on chronic dialysis [N18.6, D63.1, Z99.2]  Not Applicable    Peritoneal dialysis catheter in place [Z99.2]  Not Applicable    Essential hypertension [I10]  Yes    End stage renal disease on dialysis [N18.6, Z99.2]  Not Applicable    Seizure disorder [G40.909]  Yes    Elevated liver function tests [R79.89]  Yes    Pericardial effusion [I31.39]  Yes    Systemic lupus erythematosus [M32.9]  Yes    Thrombocytopenia [D69.6]  Yes    Hypertension secondary to other renal disorders [I15.1]  Yes    Pancytopenia [D61.818]  Yes    Vitamin D deficiency [E55.9]  Yes      Resolved Hospital Problems    Diagnosis Date Resolved POA    Abdominal pain [R10.9] 10/28/2023 Yes           Ascending aortic aneurysm dissection s/p repair (postoperative day #13) complicated by cardiac tamponade status post reexploration for bleeding after removal of a large clot compressing right ventricle (postoperative day #8)/severe aortic valve insufficiency status  postrepair in a patient with a history of hypertension/chronic combined congestive heart failure with an ejection fraction of 40%.  Stable cardiac status.  No evidence of tamponade/congestive heart failure/angina.  Blood pressure under good control.  Continue Norvasc/Coreg/losartan.  Resolved chest vascular congestion.  Severe major depression.  Lexapro started yesterday.  [facility] on board.  Patient with poor motivation and she was counseled.  Feeding and p.o. intake.  Continues with poor appetite and poor p.o. intake.  On nutritional supplement.  Tolerating soft mechanical diet and thin liquid well.  Currently on cyclic Dobbhoff tube feeds.  Speech recommends G-tube feeds if poor p.o. continues.  History of SLE.  CellCept and Plaquenil on hold.  No synovitis.  Anemia and thrombocytopenia.  No active bleed.  Anemia is multifactorial and is secondary to renal failure and postoperative blood loss.  Thrombocytopenia has improved.  Thrombocytopenia mostly secondary to medications.  Stable values continue observation.  Transfuse to keep hemoglobin more than 8. Hb has been stable.  End-stage renal disease/hypokalemia/hyponatremia hypomagnesemia.  Euvolemic.  Nephrology following and the patient is on peritoneal dialysis.  Will leave electrolyte management for nephrology.    History of seizure disorder.  CNS examination without focal deficit.  No seizures.  Continue Vimpat and Keppra.  EEG is negative for seizure.  Awaiting MRI of the brain.    VTE  prophylaxis.  Sequential compression devices.        Discussed my findings and plan of treatment with the patient/nurses  Disposition.  To be determined based on clinical course.  Eventually to skilled facility.        Lakisha Hunt MD  [facility]  11/16/23  18:31 EST

## 2023-11-16 NOTE — PROGRESS NOTES
Nutrition Services    Patient Name:  Dee Herrera  YOB: 1990  MRN: [id number]  Admit Date:  10/26/2023    Assessment Date:  11/16/23     CALORIE COUNT PROGRESS NOTE    Current TF's: Novasource Renal @ 35 ml/hr (goal 35 ml/hr) w/ 30 ml q 4 hrs free water flushes via cortrak (ND)  from 8p-8a (12 hours/day).  +BM 11/16    Patient with minimal PO intake.  Did not eat breakfast or lunch yesterday or today.  Remains very lethargic, wont; tell me anything she wants to eat.     Do not think this patient will meet needs via PO intake alone.  Recommend consideration of PEG placement.    RD to follow up tomorrow for day 3 results.    Calorie Count Data           Day 1  11/14 187 kcal (12% estimated needs) / 8 grams protein (15% estimated needs)   Day 2  11/15 No menus saved, (No Bfast or Lunch per notes)   Day 3  11/16 pending   Average Intake      Current Nutrition Orders            Food Allergies NKFA   Current PO Diet Diet: Regular/House Diet, Fluid Restriction (240 mL/tray) Diets; 1000 mL/day; Feeding Assistance - Nursing; Texture: Soft to Chew (NDD 3); Soft to Chew: Chopped Meat; Fluid Consistency: Thin (IDDSI 0)   Supplement Novasource renal   Current EN/PN  Route: ND  Order: Novasource Renal @ 35 mL/hr from 8p-8a (12 hours/day)     Estimated Requirements         Calories 1340-9912 (30-35 kcal/kg)    Protein 52-78 (1.0 gm/kg, 1.5 gm/kg)    Fluid 1 mL/kcal     RD to follow up per protocol.    Electronically signed by:  Zeina Aguilera RD  11/16/23 14:23 EST

## 2023-11-17 ENCOUNTER — APPOINTMENT (OUTPATIENT)
Dept: MRI IMAGING | Facility: HOSPITAL | Age: 33
DRG: 219 | End: 2023-11-17
Payer: MEDICARE

## 2023-11-17 PROBLEM — Z86.73 RECENT CEREBROVASCULAR ACCIDENT (CVA): Status: ACTIVE | Noted: 2023-11-17

## 2023-11-17 LAB
ALBUMIN SERPL-MCNC: 2.1 G/DL (ref 3.5–5.2)
ANION GAP SERPL CALCULATED.3IONS-SCNC: 11.4 MMOL/L (ref 5–15)
ANISOCYTOSIS BLD QL: ABNORMAL
BACTERIA SPEC AEROBE CULT: NORMAL
BACTERIA SPEC AEROBE CULT: NORMAL
BUN SERPL-MCNC: 31 MG/DL (ref 6–20)
BUN/CREAT SERPL: 6.7 (ref 7–25)
CALCIUM SPEC-SCNC: 8.1 MG/DL (ref 8.6–10.5)
CHLORIDE SERPL-SCNC: 94 MMOL/L (ref 98–107)
CO2 SERPL-SCNC: 24.6 MMOL/L (ref 22–29)
CREAT SERPL-MCNC: 4.6 MG/DL (ref 0.57–1)
DEPRECATED RDW RBC AUTO: 48.3 FL (ref 37–54)
EGFRCR SERPLBLD CKD-EPI 2021: 12.4 ML/MIN/1.73
ERYTHROCYTE [DISTWIDTH] IN BLOOD BY AUTOMATED COUNT: 14.7 % (ref 12.3–15.4)
GLUCOSE BLDC GLUCOMTR-MCNC: 106 MG/DL (ref 70–130)
GLUCOSE BLDC GLUCOMTR-MCNC: 117 MG/DL (ref 70–130)
GLUCOSE BLDC GLUCOMTR-MCNC: 155 MG/DL (ref 70–130)
GLUCOSE BLDC GLUCOMTR-MCNC: 159 MG/DL (ref 70–130)
GLUCOSE BLDC GLUCOMTR-MCNC: 90 MG/DL (ref 70–130)
GLUCOSE SERPL-MCNC: 189 MG/DL (ref 65–99)
HCT VFR BLD AUTO: 26.8 % (ref 34–46.6)
HGB BLD-MCNC: 8.5 G/DL (ref 12–15.9)
LYMPHOCYTES # BLD MANUAL: 0.39 10*3/MM3 (ref 0.7–3.1)
LYMPHOCYTES NFR BLD MANUAL: 3 % (ref 5–12)
MAGNESIUM SERPL-MCNC: 1.9 MG/DL (ref 1.6–2.6)
MCH RBC QN AUTO: 28.1 PG (ref 26.6–33)
MCHC RBC AUTO-ENTMCNC: 31.7 G/DL (ref 31.5–35.7)
MCV RBC AUTO: 88.4 FL (ref 79–97)
MONOCYTES # BLD: 0.39 10*3/MM3 (ref 0.1–0.9)
NEUTROPHILS # BLD AUTO: 12.33 10*3/MM3 (ref 1.7–7)
NEUTROPHILS NFR BLD MANUAL: 94 % (ref 42.7–76)
NRBC BLD AUTO-RTO: 0 /100 WBC (ref 0–0.2)
OVALOCYTES BLD QL SMEAR: ABNORMAL
PHOSPHATE SERPL-MCNC: 2.1 MG/DL (ref 2.5–4.5)
PLAT MORPH BLD: NORMAL
PLATELET # BLD AUTO: 154 10*3/MM3 (ref 140–450)
PMV BLD AUTO: 13 FL (ref 6–12)
POIKILOCYTOSIS BLD QL SMEAR: ABNORMAL
POLYCHROMASIA BLD QL SMEAR: ABNORMAL
POTASSIUM SERPL-SCNC: 3.2 MMOL/L (ref 3.5–5.2)
RBC # BLD AUTO: 3.03 10*6/MM3 (ref 3.77–5.28)
SODIUM SERPL-SCNC: 130 MMOL/L (ref 136–145)
VARIANT LYMPHS NFR BLD MANUAL: 3 % (ref 19.6–45.3)
WBC MORPH BLD: NORMAL
WBC NRBC COR # BLD AUTO: 13.12 10*3/MM3 (ref 3.4–10.8)

## 2023-11-17 PROCEDURE — 70551 MRI BRAIN STEM W/O DYE: CPT

## 2023-11-17 PROCEDURE — 85025 COMPLETE CBC W/AUTO DIFF WBC: CPT | Performed by: INTERNAL MEDICINE

## 2023-11-17 PROCEDURE — C9254 INJECTION, LACOSAMIDE: HCPCS | Performed by: NURSE PRACTITIONER

## 2023-11-17 PROCEDURE — 99232 SBSQ HOSP IP/OBS MODERATE 35: CPT | Performed by: PSYCHIATRY & NEUROLOGY

## 2023-11-17 PROCEDURE — 82948 REAGENT STRIP/BLOOD GLUCOSE: CPT

## 2023-11-17 PROCEDURE — 80069 RENAL FUNCTION PANEL: CPT | Performed by: INTERNAL MEDICINE

## 2023-11-17 PROCEDURE — 99024 POSTOP FOLLOW-UP VISIT: CPT | Performed by: THORACIC SURGERY (CARDIOTHORACIC VASCULAR SURGERY)

## 2023-11-17 PROCEDURE — 85007 BL SMEAR W/DIFF WBC COUNT: CPT | Performed by: INTERNAL MEDICINE

## 2023-11-17 PROCEDURE — 25010000002 LACOSAMIDE 200 MG/20ML SOLUTION: Performed by: NURSE PRACTITIONER

## 2023-11-17 PROCEDURE — 99232 SBSQ HOSP IP/OBS MODERATE 35: CPT | Performed by: NURSE PRACTITIONER

## 2023-11-17 PROCEDURE — 97530 THERAPEUTIC ACTIVITIES: CPT

## 2023-11-17 PROCEDURE — 83735 ASSAY OF MAGNESIUM: CPT | Performed by: INTERNAL MEDICINE

## 2023-11-17 RX ORDER — POTASSIUM CHLORIDE 1.5 G/1.58G
40 POWDER, FOR SOLUTION ORAL ONCE
Status: COMPLETED | OUTPATIENT
Start: 2023-11-17 | End: 2023-11-17

## 2023-11-17 RX ORDER — AMLODIPINE BESYLATE 5 MG/1
5 TABLET ORAL
Status: DISCONTINUED | OUTPATIENT
Start: 2023-11-17 | End: 2023-11-18

## 2023-11-17 RX ADMIN — LOSARTAN POTASSIUM 25 MG: 25 TABLET, FILM COATED ORAL at 08:51

## 2023-11-17 RX ADMIN — DEXTROSE MONOHYDRATE, SODIUM CHLORIDE, SODIUM LACTATE, CALCIUM CHLORIDE, MAGNESIUM CHLORIDE 2000 ML: 4.25; 538; 448; 18.4; 5.08 SOLUTION INTRAPERITONEAL at 06:54

## 2023-11-17 RX ADMIN — LACOSAMIDE 100 MG: 10 INJECTION INTRAVENOUS at 12:22

## 2023-11-17 RX ADMIN — ESCITALOPRAM OXALATE 10 MG: 10 TABLET, FILM COATED ORAL at 08:51

## 2023-11-17 RX ADMIN — LACOSAMIDE 100 MG: 10 INJECTION INTRAVENOUS at 23:27

## 2023-11-17 RX ADMIN — MUPIROCIN 1 APPLICATION: 20 OINTMENT TOPICAL at 08:50

## 2023-11-17 RX ADMIN — DEXTROSE MONOHYDRATE, SODIUM CHLORIDE, SODIUM LACTATE, CALCIUM CHLORIDE, MAGNESIUM CHLORIDE 2000 ML: 4.25; 538; 448; 18.4; 5.08 SOLUTION INTRAPERITONEAL at 15:03

## 2023-11-17 RX ADMIN — DEXTROSE MONOHYDRATE, SODIUM CHLORIDE, SODIUM LACTATE, CALCIUM CHLORIDE, MAGNESIUM CHLORIDE 2000 ML: 4.25; 538; 448; 18.4; 5.08 SOLUTION INTRAPERITONEAL at 20:41

## 2023-11-17 RX ADMIN — PANTOPRAZOLE SODIUM 40 MG: 40 INJECTION, POWDER, FOR SOLUTION INTRAVENOUS at 06:54

## 2023-11-17 RX ADMIN — DIPHENHYDRAMINE HYDROCHLORIDE AND LIDOCAINE HYDROCHLORIDE AND ALUMINUM HYDROXIDE AND MAGNESIUM HYDRO 10 ML: KIT at 06:54

## 2023-11-17 RX ADMIN — POTASSIUM CHLORIDE 40 MEQ: 1.5 FOR SOLUTION ORAL at 08:50

## 2023-11-17 RX ADMIN — DEXTROSE MONOHYDRATE, SODIUM CHLORIDE, SODIUM LACTATE, CALCIUM CHLORIDE, MAGNESIUM CHLORIDE 2000 ML: 4.25; 538; 448; 18.4; 5.08 SOLUTION INTRAPERITONEAL at 11:06

## 2023-11-17 RX ADMIN — DEXTROSE MONOHYDRATE, SODIUM CHLORIDE, SODIUM LACTATE, CALCIUM CHLORIDE, MAGNESIUM CHLORIDE 2000 ML: 4.25; 538; 448; 18.4; 5.08 SOLUTION INTRAPERITONEAL at 00:26

## 2023-11-17 RX ADMIN — DIPHENHYDRAMINE HYDROCHLORIDE AND LIDOCAINE HYDROCHLORIDE AND ALUMINUM HYDROXIDE AND MAGNESIUM HYDRO 10 ML: KIT at 08:50

## 2023-11-17 RX ADMIN — POTASSIUM CHLORIDE 40 MEQ: 1.5 FOR SOLUTION ORAL at 06:54

## 2023-11-17 RX ADMIN — CARVEDILOL 25 MG: 25 TABLET, FILM COATED ORAL at 08:50

## 2023-11-17 RX ADMIN — 0.12% CHLORHEXIDINE GLUCONATE 15 ML: 1.2 RINSE ORAL at 08:50

## 2023-11-17 RX ADMIN — AMLODIPINE BESYLATE 5 MG: 10 TABLET ORAL at 08:50

## 2023-11-17 RX ADMIN — LACOSAMIDE 100 MG: 10 INJECTION INTRAVENOUS at 00:36

## 2023-11-17 NOTE — PROGRESS NOTES
"DOS: 2023  NAME: Dee Herrera   : 1990  PCP: Margarita Woods APRN  Chief Complaint   Patient presents with    Shortness of Breath       Chief complaint: stroke  Subjective: MRI completed. She continues to complain of fatigue and generalized weakness    Objective:  Vital signs: /53 (BP Location: Right arm, Patient Position: Sitting)   Pulse 86   Temp 99.8 °F (37.7 °C) (Oral)   Resp 16   Ht 165 cm (64.96\")   Wt 51.3 kg (113 lb 3.2 oz)   LMP 08/10/2022 (Approximate)   SpO2 100%   BMI 18.86 kg/m²    Gen: NAD, vitals reviewed  MS: oriented x3, recent/remote memory intact, impaired attention/concentration, language intact, no neglect.  CN: visual acuity grossly normal, PERRL, EOMI, no facial droop, no dysarthria  Motor: 4/5 throughout upper and lower extremities, normal tone    Laboratory results:  Lab Results   Component Value Date    GLUCOSE 189 (H) 2023    CALCIUM 8.1 (L) 2023     (L) 2023    K 3.2 (L) 2023    CO2 24.6 2023    CL 94 (L) 2023    BUN 31 (H) 2023    CREATININE 4.60 (H) 2023    EGFRIFAFRI 107 2021    BCR 6.7 (L) 2023    ANIONGAP 11.4 2023     Lab Results   Component Value Date    WBC 13.12 (H) 2023    HGB 8.5 (L) 2023    HCT 26.8 (L) 2023    MCV 88.4 2023     2023     Lab Results   Component Value Date    LDL 44 2022    LDL 25 2021            Review of labs: Na 130, Cr 4.6, WBC 13.1    Review and interpretation of imaging: I personally reviewed her MRI of the brain performed this morning which is significant for a small to moderate-sized right frontal late acute infarct.  Radiology report reviewed.  She does have mild generalized atrophy which is an unexpected finding given her age.    Diagnoses:  Stroke, right middle cerebral artery, embolic  Aortic dissection status postrepair  End-stage renal disease  Epilepsy, focal onset with complex partial " seizures, not intractable, not in status    Comment: MRI confirms right MCA stroke.  This is likely a complication of her aortic dissection.  I would recommend resuming aspirin when possible.  It is at least possible that the stroke has some bearing on her encephalopathy and mood changes although this would be uncommon.  I would expect a full neurologic recovery without any residual deficit.  Continue Vimpat for seizures.  Keppra was stopped due to suspected impact on mood.    Plan:  1.  Continue Vimpat 100 twice daily  2.  Restart low-dose aspirin    Management discussed with CAESAR Eastman. Will see prn at this point

## 2023-11-17 NOTE — PLAN OF CARE
Goal Outcome Evaluation:  Plan of Care Reviewed With: patient        Progress: improving  Outcome Evaluation: Pt withdrawn but a/o. Pt currently recieving PD per orders. Pt responding to verbal and nodding and gesturing appropiately. No current complaints of pain. Pt resting in bed.

## 2023-11-17 NOTE — THERAPY TREATMENT NOTE
Patient Name: Dee Herrera  : 1990    MRN: [id number]                              Today's Date: 2023       Admit Date: 10/26/2023    Visit Dx:     ICD-10-CM ICD-9-CM   1. Shortness of breath  R06.02 786.05   2. ESRD (end stage renal disease) on dialysis  N18.6 585.6    Z99.2 V45.11   3. Hyponatremia  E87.1 276.1   4. Generalized abdominal pain  R10.84 789.07   5. Elevated lactic acid level  R79.89 276.2   6. Elevated troponin  R79.89 790.6   7. Severe aortic valve regurgitation  I35.1 424.1   8. Pericardial effusion  I31.39 423.9   9. 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 syndrome    Systemic lupus erythematosus    Lupus nephritis, ISN/RPS class IV    Hypokalemia    Hypocalcemia    COVID-19    Hospital discharge follow-up    Stage 5 chronic kidney disease    Cardiac cirrhosis    Pancreatitis    Duodenitis    Regional enteritis of small bowel    Pericardial effusion    End stage renal disease on dialysis    Hemodialysis status    Seizure disorder    Elevated liver function tests    C. difficile colitis    Anemia, chronic disease    Essential hypertension    Peritoneal dialysis catheter in place    Anemia due to chronic kidney disease, on chronic dialysis    Alternating constipation and diarrhea    Abnormal stress test    Hyponatremia    Poor appetite    Moderate malnutrition    Chronic diastolic CHF (congestive heart failure)    Aortic valve lesion    Severe aortic valve regurgitation    Dissecting ascending aortic aneurysm    Recent cerebrovascular accident (CVA)     Past Medical History:   Diagnosis Date    Anasarca     PER CT SCAN    Dry skin      ESRD (end stage renal disease) on dialysis     MARCO COLE, KATIE CHAVIRA HWDRAKE    History of abdominal pain     History of anemia     History of transfusion     Hypertension     Iron deficiency anemia 09/27/2021    Lupus (systemic lupus erythematosus) 07/30/2022    Migraine     Other specified nutritional anemias     Pericardial effusion     Renal insufficiency     Seizures     STATES LAST WAS 1/2023    Shortness of breath     OCCASIONAL    Vitamin D deficiency 09/27/2021     Past Surgical History:   Procedure Laterality Date    ASCENDING AORTIC ANEURYSM REPAIR W/ MECHANICAL AORTIC VALVE REPLACEMENT N/A 11/2/2023    Procedure: CHETNA STERNOTOMY, AORTIC ROOT REPLACEMENT WITH VALVE SPARING MISHEL PROCEDURE, REPLACEMENT OF ASCENDING AORTA, RIGHT FEMORAL DIALYSIS CATHETER PLACEMENT AND PRP;  Surgeon: Chris Medina MD;  Location: [facility] CVOR;  Service: Cardiothoracic;  Laterality: N/A;    CARDIAC CATHETERIZATION N/A 06/14/2023    Procedure: Coronary angiography;  Surgeon: Juana Taylor MD;  Location: [facility] CATH INVASIVE LOCATION;  Service: Cardiovascular;  Laterality: N/A;    CARDIAC CATHETERIZATION N/A 06/14/2023    Procedure: Left heart cath;  Surgeon: Juana Taylor MD;  Location: [facility] CATH INVASIVE LOCATION;  Service: Cardiovascular;  Laterality: N/A;    CARDIAC CATHETERIZATION N/A 06/14/2023    Procedure: Right Heart Cath;  Surgeon: Juana Taylor MD;  Location: [facility] CATH INVASIVE LOCATION;  Service: Cardiovascular;  Laterality: N/A;    COLONOSCOPY N/A 7/20/2023    Procedure: COLONOSCOPY to cecum with biopsy;  Surgeon: Drew Kaminski MD;  Location: [facility] ENDOSCOPY;  Service: Gastroenterology;  Laterality: N/A;  PRE - diarrhea, constipation  POST - fair prep, normal    CORONARY ARTERY BYPASS GRAFT N/A 11/6/2023    Procedure: STERNAL EXPLORATION AND WASH OUT;  Surgeon: Jr Mitesh Quiroz MD;  Location: [facility] CVOR;  Service: Cardiothoracic;  Laterality: N/A;    ENDOSCOPY N/A 7/20/2023     Procedure: ESOPHAGOGASTRODUODENOSCOPY with biopsy;  Surgeon: Drew Kaminski MD;  Location: [facility] ENDOSCOPY;  Service: Gastroenterology;  Laterality: N/A;  PRE - abn ct abd  POST - gastritis    INSERTION HEMODIALYSIS CATHETER N/A 07/26/2022    Procedure: RIGHT TUNNELED DIALYSIS CATHETER PLACEMENT;  Surgeon: Diandra Adhikari MD;  Location: [facility] MAIN OR;  Service: Vascular;  Laterality: N/A;    INSERTION PERITONEAL DIALYSIS CATHETER N/A 04/03/2023    Procedure: INSERTION PERITONEAL DIALYSIS CATHETER LAPAROSCOPIC, omentumpexy;  Surgeon: Jemal Loyola MD;  Location: [facility] MAIN OR;  Service: General;  Laterality: N/A;    TONSILLECTOMY        General Information       Row Name 11/17/23 1618          Physical Therapy Time and Intention    Document Type therapy note (daily note)  -CH (r) SK (t) CH (c)     Mode of Treatment individual therapy;physical therapy  -CH (r) SK (t) CH (c)       Row Name 11/17/23 1618          General Information    Patient Profile Reviewed yes  -CH (r) SK (t) CH (c)     Existing Precautions/Restrictions fall;sternal;cardiac  -CH (r) SK (t) CH (c)       Row Name 11/17/23 1618          Cognition    Orientation Status (Cognition) unable/difficult to assess;other (see comments)  pt did not talk much, but would answer yes/no questions  -CH (r) SK (t) CH (c)       Row Name 11/17/23 1618          Safety Issues, Functional Mobility    Impairments Affecting Function (Mobility) balance;endurance/activity tolerance;strength  -CH (r) SK (t) CH (c)     Comment, Safety Issues/Impairments (Mobility) non skid socks, gait belt  -CH (r) SK (t) CH (c)               User Key  (r) = Recorded By, (t) = Taken By, (c) = Cosigned By      Initials Name Provider Type    CH Kim Almendarez, PT Physical Therapist    Supriya Correia, PT Student PT Student                   Mobility       Row Name 11/17/23 1620          Bed Mobility    Bed Mobility supine-sit;sit-supine  -CH (r) SK (t) CH (c)      Supine-Sit [place] (Bed Mobility) verbal cues;nonverbal cues (demo/gesture);minimum assist (75% patient effort)  -CH (r) SK (t) CH (c)     Sit-Supine [place] (Bed Mobility) nonverbal cues (demo/gesture);verbal cues;minimum assist (75% patient effort)  -CH (r) SK (t) CH (c)     Assistive Device (Bed Mobility) head of bed elevated;bed rails  -CH (r) SK (t) CH (c)       Row Name 11/17/23 1620          Sit-Stand Transfer    Sit-Stand [place] (Transfers) verbal cues;nonverbal cues (demo/gesture);minimum assist (75% patient effort)  -CH (r) SK (t) CH (c)     Assistive Device (Sit-Stand Transfers) other (see comments)  hha  -CH (r) SK (t) CH (c)       Row Name 11/17/23 1620          Gait/Stairs (Locomotion)    [place] Level (Gait) verbal cues;nonverbal cues (demo/gesture);minimum assist (75% patient effort);1 person assist;2 person assist  min x1 last 25'  -CH (r) SK (t) CH (c)     Assistive Device (Gait) other (see comments)  hha  -CH (r) SK (t) CH (c)     Distance in Feet (Gait) 75  -CH (r) SK (t) CH (c)     Deviations/Abnormal Patterns (Gait) amrita decreased;festinating/shuffling;gait speed decreased;stride length decreased  -CH (r) SK (t) CH (c)     Bilateral Gait Deviations heel strike decreased  -CH (r) SK (t) CH (c)     Comment, (Gait/Stairs) pt more steady this date, requiring less assistance and able to ambulate further  -CH (r) SK (t) CH (c)               User Key  (r) = Recorded By, (t) = Taken By, (c) = Cosigned By      Initials Name Provider Type     Kim Almendarez, PT Physical Therapist    Supriya Correia, PT Student PT Student                   Obj/Interventions       Row Name 11/17/23 1627          Motor Skills    Therapeutic Exercise --  10x AP, partial LAQ  -CH (r) SK (t) CH (c)       Row Name 11/17/23 1627          Balance    Balance Assessment standing static balance;standing dynamic balance  -CH (r) SK (t) CH (c)     Static Standing Balance verbal cues;contact guard   "-CH (r) SK (t) CH (c)     Dynamic Standing Balance verbal cues;minimal assist  -CH (r) SK (t) CH (c)     Position/Device Used, Standing Balance other (see comments)  hha  -CH (r) SK (t) CH (c)               User Key  (r) = Recorded By, (t) = Taken By, (c) = Cosigned By      Initials Name Provider Type    Kim Gardiner, PT Physical Therapist    Supriya Correia, PT Student PT Student                   Goals/Plan    No documentation.                  Clinical Impression       Row Name 11/17/23 1628          Pain    Pre/Posttreatment Pain Comment pt did not report pain this date, when asked if she was dizzy she shook her head \"yes\"  -CH (r) SK (t) CH (c)     Pain Intervention(s) Repositioned;Ambulation/increased activity;Rest  -CH (r) SK (t) CH (c)       Row Name 11/17/23 1628          Plan of Care Review    Plan of Care Reviewed With patient  -CH (r) SK (t) CH (c)     Outcome Evaluation Pt presents in bed and agreeable to therapy this PM. Performed bed mobility and STS with min assist and ambulated 75' with min hha x2. The last 25', pt ambulated with min hha x1. Pt participated in therapy more today, performing LE exercises while sitting EOB. Pt cont to demonstrate decreased activity tolerance, weakness, and balance deficits. PT will cont to follow.  -CH (r) SK (t) CH (c)       Row Name 11/17/23 1628          Positioning and Restraints    Pre-Treatment Position in bed  -CH (r) SK (t) CH (c)     Post Treatment Position bed  -CH (r) SK (t) CH (c)     In Bed fowlers;call light within reach;encouraged to call for assist;exit alarm on  -CH (r) SK (t) CH (c)               User Key  (r) = Recorded By, (t) = Taken By, (c) = Cosigned By      Initials Name Provider Type    Kim Gardiner, PT Physical Therapist    Supriya Correia, PT Student PT Student                   Outcome Measures       Row Name 11/17/23 1632          How much help from another person do you currently need...    Turning from " your back to your side while in flat bed without using bedrails? 3  -CH (r) SK (t) CH (c)     Moving from lying on back to sitting on the side of a flat bed without bedrails? 3  -CH (r) SK (t) CH (c)     Moving to and from a bed to a chair (including a wheelchair)? 3  -CH (r) SK (t) CH (c)     Standing up from a chair using your arms (e.g., wheelchair, bedside chair)? 3  -CH (r) SK (t) CH (c)     Climbing 3-5 steps with a railing? 2  -CH (r) SK (t) CH (c)     To walk in hospital room? 3  -CH (r) SK (t) CH (c)     AM-PAC 6 Clicks Score (PT) 17  -CH (r) SK (t)     Highest Level of Mobility Goal 5 --> Static standing  -CH (r) SK (t)       Row Name 11/17/23 1632          Functional Assessment    Outcome Measure Options AM-PAC 6 Clicks Basic Mobility (PT)  -CH (r) SK (t) CH (c)               User Key  (r) = Recorded By, (t) = Taken By, (c) = Cosigned By      Initials Name Provider Type     Kim Almendarez, PT Physical Therapist    Supriya Correia, HYUN Student PT Student                                 Physical Therapy Education       Title: PT OT SLP Therapies (In Progress)       Topic: Physical Therapy (Done)       Point: Mobility training (Done)       Learning Progress Summary             Patient Acceptance, E,TB,D, VU by SK at 11/17/2023 1633    Acceptance, E,D, NR by  at 11/16/2023 1618    Acceptance, E,TB,D, VU by SK at 11/15/2023 1521    Acceptance, E, VU,NR by SK at 11/13/2023 1247    Acceptance, E,TB,D, VU,NR by  at 11/12/2023 1148                         Point: Home exercise program (Done)       Learning Progress Summary             Patient Acceptance, E,TB,D, VU by SK at 11/17/2023 1633    Acceptance, E,D, NR by  at 11/16/2023 1618    Acceptance, E,TB,D, VU by SK at 11/15/2023 1521    Acceptance, E,TB,D, VU,NR by  at 11/12/2023 1148                         Point: Body mechanics (Done)       Learning Progress Summary             Patient Acceptance, E,TB,D, VU by SK at 11/17/2023 9293     Acceptance, E,D, NR by PC at 11/16/2023 1618    Acceptance, E,TB,D, VU by SK at 11/15/2023 1521    Acceptance, E, VU,NR by SK at 11/13/2023 1247    Acceptance, E,TB,D, VU,NR by  at 11/12/2023 1148                         Point: Precautions (Done)       Learning Progress Summary             Patient Acceptance, E,TB,D, VU by SK at 11/17/2023 1633    Acceptance, E,D, NR by  at 11/16/2023 1618    Acceptance, E,TB,D, VU by SK at 11/15/2023 1521    Acceptance, E, VU,NR by SK at 11/13/2023 1247    Acceptance, E,TB,D, VU,NR by  at 11/12/2023 1148                                         User Key       Initials Effective Dates Name Provider Type Discipline     06/16/21 -  Kim Almendarez, PT Physical Therapist PT     06/16/21 -  Elba Whaley, PT Physical Therapist PT    SK 10/10/23 -  Supriya Harris, HYUN Student PT Student PT                  PT Recommendation and Plan     Plan of Care Reviewed With: patient  Outcome Evaluation: Pt presents in bed and agreeable to therapy this PM. Performed bed mobility and STS with min assist and ambulated 75' with min hha x2. The last 25', pt ambulated with min hha x1. Pt participated in therapy more today, performing LE exercises while sitting EOB. Pt cont to demonstrate decreased activity tolerance, weakness, and balance deficits. PT will cont to follow.     Time Calculation:         PT Charges       Row Name 11/17/23 1633             Time Calculation    Start Time 1423  -CH (r) SK (t) CH (c)      Stop Time 1432  -CH (r) SK (t) CH (c)      Time Calculation (min) 9 min  -CH (r) SK (t)      PT Received On 11/17/23  -CH (r) SK (t) CH (c)      PT - Next Appointment 11/18/23  -CH (r) SK (t) CH (c)         Time Calculation- PT    Total Timed Code Minutes- PT 9 minute(s)  -CH (r) SK (t) CH (c)         Timed Charges    80235 - PT Therapeutic Activity Minutes 9  -CH (r) SK (t) CH (c)         Total Minutes    Timed Charges Total Minutes 9  -CH (r) SK (t)       Total Minutes 9   - (r) SK (t)                User Key  (r) = Recorded By, (t) = Taken By, (c) = Cosigned By      Initials Name Provider Type    Kim Gardiner, PT Physical Therapist    Supriya Correia, PT Student PT Student                  Therapy Charges for Today       Code Description Service Date Service Provider Modifiers Qty    [id number]  PT THERAPEUTIC ACT EA 15 MIN 11/17/2023 Supriya Harris, PT Student GP 1            PT G-Codes  Outcome Measure Options: AM-PAC 6 Clicks Basic Mobility (PT)  AM-PAC 6 Clicks Score (PT): 17  AM-PAC 6 Clicks Score (OT): 6  Modified [place] Scale: 4 - Moderately severe disability.  Unable to walk without assistance, and unable to attend to own bodily needs without assistance.  PT Discharge Summary  Anticipated Discharge Disposition (PT): skilled nursing facility, inpatient rehabilitation facility    Supriya Harris PT Student  11/17/2023

## 2023-11-17 NOTE — PLAN OF CARE
Goal Outcome Evaluation:  Plan of Care Reviewed With: (P) patient           Outcome Evaluation: (P) Pt presents in bed and agreeable to therapy this PM. Performed bed mobility and STS with min assist and ambulated 75' with min hha x2. The last 25', pt ambulated with min hha x1. Pt participated in therapy more today, performing LE exercises while sitting EOB. Pt cont to demonstrate decreased activity tolerance, weakness, and balance deficits. PT will cont to follow.      Anticipated Discharge Disposition (PT): (P) skilled nursing facility, inpatient rehabilitation facility   Start Levaquin as prescribed.     Follow-up with primary care for further testing in 1-2 weeks.     Return here for any other concerns.

## 2023-11-17 NOTE — PROGRESS NOTES
Hospital Follow Up    LOS:  LOS: 22 days   Patient Name: Dee Herrera  Age/Sex: 33 y.o. female  : 1990  MRN: [id number]    Day of Service: 23   Length of Stay: 22  Encounter Provider: CAESAR Michaels  Place of Service: [facility] CARDIOLOGY  Patient Care Team:  Margarita Woods APRN as PCP - General (Nurse Practitioner)  Winnie Sanchez MD as Referring Physician (Obstetrics and Gynecology)  Norberto Almaraz MD PhD as Consulting Physician (Hematology and Oncology)  Lupis Thomas MD as Consulting Physician (Nephrology)  Hair Mckeon MD as Consulting Physician (Nephrology)  Juana Taylor MD as Consulting Physician (Cardiology)    Subjective:     Chief Complaint: follow up aortic aneurysm/dissection, severe AR    Interval History: No complaints fo chest pain or SOA. Still very withdrawn and says she is just tired.    Objective:     Objective:  Temp:  [98.7 °F (37.1 °C)-99.8 °F (37.7 °C)] 99.8 °F (37.7 °C)  Heart Rate:  [81-88] 88  Resp:  [16-17] 16  BP: ()/(44-73) 103/53     Intake/Output Summary (Last 24 hours) at 2023 0910  Last data filed at 2023 0700  Gross per 24 hour   Intake 8270 ml   Output 9400 ml   Net -1130 ml     Body mass index is 18.86 kg/m².      11/15/23  0447 23  0657 23  0651   Weight: 53.7 kg (118 lb 6.4 oz) 50.8 kg (112 lb 1.6 oz) 51.3 kg (113 lb 3.2 oz)     Weight change: 0.499 kg (1 lb 1.6 oz)    Physical Exam:   General Appearance:    Awake alert and oriented, flat affect   Color:  Skin:  Neuro:  HEENT:    Lungs:     Pink  Warm and dry  No focal, motor or sensory deficits  Neck supple, pupils equal, round and reactive. No JVD, No Bruit  Clear to auscultation,respirations regular, even and                  unlabored    Heart:    Regular rate and rhythm, S1 and S2, + sys murmur, no gallop, no rub. No edema, DP/PT pulses are 2+   Chest Wall:    Midsternal incision clean and dry   Abdomen:      "Normal bowel sounds, no masses, no organomegaly, soft        non-tender, non-distended, no guarding, no ascites noted   Extremities:   Moves all extremities well, no edema, no cyanosis, no redness       Lab Review:   Results from last 7 days   Lab Units 11/17/23  0243 11/16/23  0242 11/15/23  0239 11/14/23  0308   SODIUM mmol/L 130* 130*   < > 134*   POTASSIUM mmol/L 3.2* 3.0*   < > 3.7   CHLORIDE mmol/L 94* 96*   < > 100   CO2 mmol/L 24.6 24.9   < > 24.0   BUN mg/dL 31* 28*   < > 21*   CREATININE mg/dL 4.60* 4.52*   < > 3.31*   GLUCOSE mg/dL 189* 130*   < > 124*   CALCIUM mg/dL 8.1* 8.4*   < > 8.9   AST (SGOT) U/L  --   --   --  28   ALT (SGPT) U/L  --   --   --  11    < > = values in this interval not displayed.         Results from last 7 days   Lab Units 11/17/23  0243 11/16/23  0242   WBC 10*3/mm3 13.12* 11.48*   HEMOGLOBIN g/dL 8.5* 8.9*   HEMATOCRIT % 26.8* 26.8*   PLATELETS 10*3/mm3 154 136*         Results from last 7 days   Lab Units 11/17/23  0243 11/16/23  0242   MAGNESIUM mg/dL 1.9 1.5*           Invalid input(s): \"LDLCALC\"      Results from last 7 days   Lab Units 11/13/23  0258   TSH uIU/mL 5.990*     I reviewed the patient's new clinical results.  I personally viewed and interpreted the patient's EKG  Current Medications:   Scheduled Meds:amLODIPine, 5 mg, Oral, Q24H  [Held by provider] aspirin, 81 mg, Oral, Daily  carvedilol, 25 mg, Oral, Q12H  chlorhexidine, 15 mL, Mouth/Throat, Q12H  Delflex-LC/4.25% Dextrose, 2,000 mL, Intraperitoneal, 5 Exchanges Daily - Dwell Overnight  escitalopram, 10 mg, Oral, Daily  First Mouthwash (Magic Mouthwash), 10 mL, Swish & Spit, Q6H  [Held by provider] heparin (porcine), 5,000 Units, Subcutaneous, Q8H  insulin regular, 2-7 Units, Subcutaneous, Q6H  Lacosamide, 100 mg, Intravenous, Q12H  lidocaine, 10 mL, Subcutaneous, Once  losartan, 25 mg, Oral, Q24H  mupirocin, , Each Nare, BID  pantoprazole, 40 mg, Intravenous, Q AM  potassium chloride, 40 mEq, Nasogastric, " Once      Continuous Infusions:hold, 1 each        Allergies:  Allergies   Allergen Reactions    Minoxidil Hives and Other (See Comments)     Pericardial effusion .       Assessment:       Dissecting ascending aortic aneurysm    Vitamin D deficiency    Thrombocytopenia    Hypertension secondary to other renal disorders    Pancytopenia    Systemic lupus erythematosus    Pericardial effusion    End stage renal disease on dialysis    Seizure disorder    Elevated liver function tests    Essential hypertension    Peritoneal dialysis catheter in place    Anemia due to chronic kidney disease, on chronic dialysis    Hyponatremia    Poor appetite    Moderate malnutrition    Chronic diastolic CHF (congestive heart failure)    Aortic valve lesion    Severe aortic valve regurgitation        Plan:   Ascending aortic aneurysm with subacute/chronic aortic root dissection: s/p repair and proximal aortic replacement on 11/2.   Severe aortic regurgitation: s/p repair on 11/2.   Cardiac tamponade: secondary to pericardial thrombus anteriorly and compressing right ventricle. S/p reexploration with clot removal and treatment of a small amount of bleeding at the suture line on 11/6.  Cardiogenic shock: due to #3. Resolved  Seizures: postoperatively. EEG yesterday normal. 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 On PD at home. HD postoperatively, last session on 11/13. Now switched back to PD.  Hyponatremia: resolved  HTN: blood pressure well controlled. On amlodipine and carvedilol. Losartan added yesterday.  Chronic anemia: stable  SLE  Depression: Access center has seen patient. At this point, she denies any depression. She was started on escitalopram.      -scheduled for thoracentesis yesterday but this was cancelled as chest xray had improved  -Plans for MRI soon since AV wired have been removed  -Still appears quite depressed. On Lexapro  -Stable from a cardiac standpoint. No changes at this time.        Shereen WHITT  CAESAR Sharma  11/17/23  09:10 EST  Electronically signed by CAESAR Daomn, 11/15/23, 9:03 AM EST.

## 2023-11-17 NOTE — NURSING NOTE
Patient transferred with belongings including PD supplies to [facility] via . Patient tired but tolerated well. CVI nurses educated on PD procedure and new bag/set hung to infuse. Patient's grandmother contacted via phone and a message  was left updating her on room assignment.

## 2023-11-17 NOTE — PROGRESS NOTES
" LOS: 22 days   Patient Care Team:  Margarita Woods APRN as PCP - General (Nurse Practitioner)  Winnie Sanchez MD as Referring Physician (Obstetrics and Gynecology)  Norberto Almaraz MD PhD as Consulting Physician (Hematology and Oncology)  Lupis Thomas MD as Consulting Physician (Nephrology)  Hair Mckeon MD as Consulting Physician (Nephrology)  Juana Taylor MD as Consulting Physician (Cardiology)    Chief Complaint: post op    Subjective:  Symptoms:  Stable.  No shortness of breath, cough or chest pain.    Diet:  Poor intake.  No nausea or vomiting.    Activity level: Impaired due to weakness.    Pain:  She reports no pain.          Vital Signs  Temp:  [98.7 °F (37.1 °C)-99.8 °F (37.7 °C)] 99.8 °F (37.7 °C)  Heart Rate:  [81-88] 88  Resp:  [16-17] 16  BP: ()/(44-73) 103/53  Body mass index is 18.86 kg/m².    Intake/Output Summary (Last 24 hours) at 11/17/2023 0850  Last data filed at 11/17/2023 0700  Gross per 24 hour   Intake 8270 ml   Output 9400 ml   Net -1130 ml     No intake/output data recorded.          11/15/23  0447 11/16/23  0657 11/17/23  0651   Weight: 53.7 kg (118 lb 6.4 oz) 50.8 kg (112 lb 1.6 oz) 51.3 kg (113 lb 3.2 oz)         Objective:  General Appearance:  Comfortable and in no acute distress.    Vital signs: (most recent): Blood pressure 103/53, pulse 86, temperature 98.9 °F (37.2 °C), resp. rate 16, height 165 cm (64.96\"), weight 51.3 kg (113 lb 3.2 oz), last menstrual period 08/10/2022, SpO2 99%, not currently breastfeeding.  Vital signs are normal.  No fever.    Output: No urine output and producing stool.    Lungs:  Normal effort and normal respiratory rate.  There are decreased breath sounds.    Heart: Normal rate.  Regular rhythm.  (SR on tele monitor)  Abdomen: Abdomen is soft and non-distended.  Bowel sounds are normal.     Pulses: Distal pulses are intact.    Neurological: Patient is oriented to person, place and time.  (drowsy).    Skin:  Warm and dry.  " "(Sternal dressing clean, dry, and intact)          Results Review:        WBC WBC   Date Value Ref Range Status   11/17/2023 13.12 (H) 3.40 - 10.80 10*3/mm3 Final   11/16/2023 11.48 (H) 3.40 - 10.80 10*3/mm3 Final   11/15/2023 10.62 3.40 - 10.80 10*3/mm3 Final      HGB Hemoglobin   Date Value Ref Range Status   11/17/2023 8.5 (L) 12.0 - 15.9 g/dL Final   11/16/2023 8.9 (L) 12.0 - 15.9 g/dL Final   11/15/2023 8.2 (L) 12.0 - 15.9 g/dL Final      HCT Hematocrit   Date Value Ref Range Status   11/17/2023 26.8 (L) 34.0 - 46.6 % Final   11/16/2023 26.8 (L) 34.0 - 46.6 % Final   11/15/2023 24.6 (L) 34.0 - 46.6 % Final      Platelets Platelets   Date Value Ref Range Status   11/17/2023 154 140 - 450 10*3/mm3 Final   11/16/2023 136 (L) 140 - 450 10*3/mm3 Final   11/15/2023 118 (L) 140 - 450 10*3/mm3 Final        PT/INR:  No results found for: \"PROTIME\"/No results found for: \"INR\"    Sodium Sodium   Date Value Ref Range Status   11/17/2023 130 (L) 136 - 145 mmol/L Final   11/16/2023 130 (L) 136 - 145 mmol/L Final   11/15/2023 130 (L) 136 - 145 mmol/L Final      Potassium Potassium   Date Value Ref Range Status   11/17/2023 3.2 (L) 3.5 - 5.2 mmol/L Final   11/16/2023 3.0 (L) 3.5 - 5.2 mmol/L Final   11/15/2023 3.3 (L) 3.5 - 5.2 mmol/L Final      Chloride Chloride   Date Value Ref Range Status   11/17/2023 94 (L) 98 - 107 mmol/L Final   11/16/2023 96 (L) 98 - 107 mmol/L Final   11/15/2023 98 98 - 107 mmol/L Final      Bicarbonate CO2   Date Value Ref Range Status   11/17/2023 24.6 22.0 - 29.0 mmol/L Final   11/16/2023 24.9 22.0 - 29.0 mmol/L Final   11/15/2023 20.7 (L) 22.0 - 29.0 mmol/L Final      BUN BUN   Date Value Ref Range Status   11/17/2023 31 (H) 6 - 20 mg/dL Final   11/16/2023 28 (H) 6 - 20 mg/dL Final   11/15/2023 26 (H) 6 - 20 mg/dL Final      Creatinine Creatinine   Date Value Ref Range Status   11/17/2023 4.60 (H) 0.57 - 1.00 mg/dL Final   11/16/2023 4.52 (H) 0.57 - 1.00 mg/dL Final   11/15/2023 4.03 (H) 0.57 - " 1.00 mg/dL Final      Calcium Calcium   Date Value Ref Range Status   11/17/2023 8.1 (L) 8.6 - 10.5 mg/dL Final   11/16/2023 8.4 (L) 8.6 - 10.5 mg/dL Final   11/15/2023 8.8 8.6 - 10.5 mg/dL Final      Magnesium Magnesium   Date Value Ref Range Status   11/17/2023 1.9 1.6 - 2.6 mg/dL Final   11/16/2023 1.5 (L) 1.6 - 2.6 mg/dL Final   11/15/2023 1.7 1.6 - 2.6 mg/dL Final          amLODIPine, 5 mg, Oral, Q24H  [Held by provider] aspirin, 81 mg, Oral, Daily  carvedilol, 25 mg, Oral, Q12H  chlorhexidine, 15 mL, Mouth/Throat, Q12H  Delflex-LC/4.25% Dextrose, 2,000 mL, Intraperitoneal, 5 Exchanges Daily - Dwell Overnight  escitalopram, 10 mg, Oral, Daily  First Mouthwash (Magic Mouthwash), 10 mL, Swish & Spit, Q6H  [Held by provider] heparin (porcine), 5,000 Units, Subcutaneous, Q8H  insulin regular, 2-7 Units, Subcutaneous, Q6H  Lacosamide, 100 mg, Intravenous, Q12H  lidocaine, 10 mL, Subcutaneous, Once  losartan, 25 mg, Oral, Q24H  mupirocin, , Each Nare, BID  pantoprazole, 40 mg, Intravenous, Q AM  potassium chloride, 40 mEq, Nasogastric, Once      hold, 1 each      Assessment & Plan    - Ascending aortic aneurysm with dissection- s/p ascending aortic dissection repair/proximal replacement, gacron interposition graft, kelli procedure; insertion of right femoral shiley---POD#14; Pagni  - Cardiac tamponade- reexploration for bleeding, removal of large clot compressing the RV---POD#10; Camporrotondo  - severe aortic valve insufficiency  -Encephalopathy, improving   - ESRD on PD  - Lupus--on Cellcept/plaquenil; currently held  - Epilepsy  - chronic immunosuppression  - hypertension  - chronic anemia  - TCP--chronic; lovenox on hold  - Depression--started on lexapro 11/14     Remains withdrawn, but walked in the halls yesterday--continue lexapro  CXR improved; thoracentesis cancelled yesterday. Weaned back to RA.  Remains on nocturnal feeds--calorie count ending today. She still isn't really eating. Await nutrition opinion.  Possible will need cont feeds restarted or discussion for PEG placement but she does PD? Will d/w Dr. Medina.  Remains in NSR, HR/BP stable.   OK for MRI of the brain, AV wires removed. Will reach out to neurology. Heparin on hold.  On PD--managed per nephrology   She is very weak. Continue aggressive PT/OT  Continue supportive care    Shea Monteiro, CAESAR  11/17/23  08:50 EST

## 2023-11-17 NOTE — NURSING NOTE
Access center follow up.    Patient resting quietly with eyes closed. Chart reviewed.    Per overnight RN patient rested in bed, withdrawn, alert and oriented. Per nursing notes responds with nods and gesturing. Yesterday patient lethargic, participated in PT, did not participate in OT. Current medication lexapro. Patient denies depression.[facility]. Access following.

## 2023-11-17 NOTE — PROGRESS NOTES
Name: Dee Herrera ADMIT: 10/26/2023   : 1990  PCP: Margarita Woods APRN    MRN: [id number] LOS: 22 days   AGE/SEX: 33 y.o. female  ROOM: [facility]     Subjective   Subjective   No overnight events.  No change in the situation.  Continues to have no motivation still with poor p.o. intake..  No fever or chills.  No chest pain.  No shortness of breath.  No nausea or vomiting.     Objective   Objective   Vital Signs  Temp:  [98.7 °F (37.1 °C)-99.8 °F (37.7 °C)] 98.9 °F (37.2 °C)  Heart Rate:  [81-92] 92  Resp:  [16-17] 16  BP: ()/(44-73) 103/53  SpO2:  [98 %-100 %] 100 %  on   ;   Device (Oxygen Therapy): room air    Intake/Output Summary (Last 24 hours) at 2023 1539  Last data filed at 2023 1230  Gross per 24 hour   Intake 8285 ml   Output 25074 ml   Net -2215 ml     Body mass index is 18.86 kg/m².      11/15/23  0447 23  0657 23  0651   Weight: 53.7 kg (118 lb 6.4 oz) 50.8 kg (112 lb 1.6 oz) 51.3 kg (113 lb 3.2 oz)     Physical Exam  General.  Middle-aged female.  Alert and oriented x3.  Lethargic.  No apparent pain/distress/diaphoresis.  Appears depressed with flat affect.  eyes.  Pupils equal round and reactive.  Intact extraocular musculature.  No pallor or jaundice  Oral cavity.  Moist mucous membrane.  Neck.  Supple.  No JVD.  No lymphadenopathy or thyromegaly.  Cardiovascular.  Regular rate and rhythm with grade 2 systolic murmur.  Healthy sternotomy scar.    Chest.  Poor bilateral air entry with scattered bilateral rhonchi.  Abdomen.  Soft lax.  No tenderness.  No organomegaly.  No guarding or rebound.  Peritoneal catheter in place.  Extremities.  No clubbing/cyanosis/edema.  CNS.  No acute focal neurological deficits.      Results Review:      Results from last 7 days   Lab Units 23  0243 23  0242 11/15/23  0239 23  0308 23  0258 23  0359 23  0309   SODIUM mmol/L 130* 130* 130* 134* 133* 134* 136   POTASSIUM mmol/L 3.2* 3.0* 3.3*  "3.7 3.5 3.6 3.2*   CHLORIDE mmol/L 94* 96* 98 100 99 99 104   CO2 mmol/L 24.6 24.9 20.7* 24.0 22.0 22.0 21.5*   BUN mg/dL 31* 28* 26* 21* 32* 21* 42*   CREATININE mg/dL 4.60* 4.52* 4.03* 3.31* 4.34* 3.16* 4.76*   GLUCOSE mg/dL 189* 130* 112* 124* 118* 106* 122*   CALCIUM mg/dL 8.1* 8.4* 8.8 8.9 8.5* 8.3* 8.4*   AST (SGOT) U/L  --   --   --  28  --   --   --    ALT (SGPT) U/L  --   --   --  11  --   --   --      Estimated Creatinine Clearance: 14.4 mL/min (A) (by C-G formula based on SCr of 4.6 mg/dL (H)).      Results from last 7 days   Lab Units 11/17/23  1156 11/17/23  0624 11/17/23  0045 11/16/23  2037 11/16/23  2001 11/16/23  1616 11/16/23  1126 11/16/23  0637   GLUCOSE mg/dL 117 106 155* 154* 51* 263* 84 111             Results from last 7 days   Lab Units 11/13/23  0258   TSH uIU/mL 5.990*     Results from last 7 days   Lab Units 11/17/23  0243 11/16/23  0242 11/15/23  0239 11/14/23  0308 11/11/23  1643 11/11/23  0309   MAGNESIUM mg/dL 1.9 1.5* 1.7 1.7  --  1.9   PHOSPHORUS mg/dL 2.1* 2.9 2.7 2.0*   < > 1.4*    < > = values in this interval not displayed.           Invalid input(s): \"LDLCALC\"  Results from last 7 days   Lab Units 11/17/23  0243 11/16/23  0242 11/15/23  0239 11/14/23  0308 11/13/23  0811 11/12/23  0359 11/11/23  0309   WBC 10*3/mm3 13.12* 11.48* 10.62 9.31 10.66 11.93* 9.87   HEMOGLOBIN g/dL 8.5* 8.9* 8.2* 7.8* 8.0* 8.0* 8.5*   HEMATOCRIT % 26.8* 26.8* 24.6* 24.2* 23.8* 23.9* 25.3*   PLATELETS 10*3/mm3 154 136* 118* 120* 100* 93* 86*   MCV fL 88.4 85.9 86.0 87.4 85.0 84.5 85.8   MCH pg 28.1 28.5 28.7 28.2 28.6 28.3 28.8   MCHC g/dL 31.7 33.2 33.3 32.2 33.6 33.5 33.6   RDW % 14.7 14.7 14.6 14.8 15.1 15.2 15.4   RDW-SD fl 48.3 46.7 46.2 47.8 46.3 46.6 48.7   MPV fL 13.0*  --  13.4*  --   --  13.3* 11.0   NEUTROPHIL % %  --  86.4* 84.2* 83.9*  --   --  82.1*   LYMPHOCYTE % %  --  3.3* 3.6* 4.0*  --   --  4.0*   MONOCYTES % %  --  8.7 10.5 10.5  --   --  11.9   EOSINOPHIL % %  --  0.5 0.3 0.1*  --  "  --  0.2*   BASOPHIL % %  --  0.2 0.3 0.1  --   --  0.2   NEUTROS ABS 10*3/mm3 12.33* 9.92* 8.94* 7.81*  --   --  8.11*   LYMPHS ABS 10*3/mm3  --  0.38* 0.38* 0.37*  --   --  0.39*   MONOS ABS 10*3/mm3  --  1.00* 1.12* 0.98*  --   --  1.17*   EOS ABS 10*3/mm3  --  0.06 0.03 0.01  --   --  0.02   BASOS ABS 10*3/mm3  --  0.02 0.03 0.01  --   --  0.02   NRBC /100 WBC 0.0  --   --   --   --   --   --          Results from last 7 days   Lab Units 11/13/23  0809   PH, ARTERIAL pH units 7.527*   PO2 ART mm Hg 73.0*   PCO2, ARTERIAL mm Hg 25.8*   HCO3 ART mmol/L 21.5*     Results from last 7 days   Lab Units 11/11/23  0309   PROCALCITONIN ng/mL 5.05*         Results from last 7 days   Lab Units 11/14/23  0308   AMMONIA umol/L 21     Results from last 7 days   Lab Units 11/12/23  1924 11/12/23  1524   BLOODCX  No growth at 4 days No growth at 4 days                   Imaging:  Imaging Results (Last 24 Hours)       Procedure Component Value Units Date/Time    MRI Brain Without Contrast [[id number]] Collected: 11/17/23 1050     Updated: 11/17/23 1138    Narrative:      MRI OF THE BRAIN WITHOUT CONTRAST ON 11/17/2023     CLINICAL HISTORY: Patient is status post aortic dissection, has severe  lethargy, has recent head CT demonstrating right juxtatentorial and  posterior right occipital thin subdural hematoma.     TECHNIQUE: Axial T1, FLAIR, fat-suppressed T2, axial diffusion and  gradient echo T2 weighted images were obtained of the entire head.     This is correlated to prior head CTs on 11/04/2023 and 11/03/2023.     FINDINGS: There is a focal area of intense increased signal intensity on  the diffusion weighted images that demonstrates faint high signal on  FLAIR and low signal on the ADC maps compatible with focal cytotoxic  edema in the anterior right frontal cortex compatible with an acute  anterior right frontal cortical infarct measuring 2.4 x 1.9 x 2 cm in  size. This is in the right KASH territory. The remainder of the  brain  parenchyma is normal in signal intensity. There is some mild diffuse  cerebral atrophy or volume loss. The ventricles are normal in size. I  see no midline shift. There is a thin subacute subdural hematoma  overlying the posterior and lateral right occipital lobe and wrapping  along the superior margin of the medial right tentorium cerebelli that  measures 2 to 3 mm in thickness, unchanged since head CT 11/04/2023.  Exerts no mass effect. No additional extra-axial fluid collections are  identified. There is moderate left sphenoid sinus mucosal thickening,  mild inferior left maxillary sinus mucosal thickening. The remainder the  paranasal sinuses are clear. There is small amount of fluid in the  mastoid air cells bilaterally. Good flow voids are demonstrated within  the cerebral vessels and in the dural venous sinuses.       Impression:      1. There is a 2.4 x 1.9 x 2 cm acute cortical infarct in the anterior  inferior medial right frontal lobe within the right KASH territory.     2. There is a thin subacute subdural hematoma overlying the posterior  and lateral right occipital lobe and focally wrapping along the superior  margin of the medial right tentorium cerebelli that measures 2 to 3 mm  in thickness and unchanged since head CT 11/04/2023 and follow-up head  CT to resolution of the subdural is warranted.     3. There appears to be some mild diffuse cerebral atrophy or volume loss  and correlate clinically as to etiology in this young 33-year-old  patient.     4. There is left sphenoid and inferior left maxillary sinus mucosal  thickening and small amount of fluid in the mastoid air cells  bilaterally. The remainder of the MRI of the brain is normal. The  results were communicated to Dr. Fernando Gamino from [facility] by  telephone 11/17/2023 at 10:00 a.m.     This report was finalized on 11/17/2023 11:35 AM by Dr. Bobby Ruelas M.D  on Workstation: [id number]                  I reviewed the patient's  new clinical results / labs / tests / procedures      Assessment/Plan     Active Hospital Problems    Diagnosis  POA    **Dissecting ascending aortic aneurysm [I71.010]  Yes    Recent cerebrovascular accident (CVA) [Z86.73]  Yes    Aortic valve lesion [I35.8]  Yes    Severe aortic valve regurgitation [I35.1]  Yes    Hyponatremia [E87.1]  Yes    Poor appetite [R63.0]  Yes    Moderate malnutrition [E44.0]  Yes    Chronic diastolic CHF (congestive heart failure) [I50.32]  Yes    Anemia due to chronic kidney disease, on chronic dialysis [N18.6, D63.1, Z99.2]  Not Applicable    Peritoneal dialysis catheter in place [Z99.2]  Not Applicable    Essential hypertension [I10]  Yes    End stage renal disease on dialysis [N18.6, Z99.2]  Not Applicable    Seizure disorder [G40.909]  Yes    Elevated liver function tests [R79.89]  Yes    Pericardial effusion [I31.39]  Yes    Systemic lupus erythematosus [M32.9]  Yes    Thrombocytopenia [D69.6]  Yes    Hypertension secondary to other renal disorders [I15.1]  Yes    Pancytopenia [D61.818]  Yes    Vitamin D deficiency [E55.9]  Yes      Resolved Hospital Problems    Diagnosis Date Resolved POA    Abdominal pain [R10.9] 10/28/2023 Yes           Ascending aortic aneurysm dissection s/p repair (postoperative day #13) complicated by cardiac tamponade status post reexploration for bleeding after removal of a large clot compressing right ventricle (postoperative day #8)/severe aortic valve insufficiency status postrepair in a patient with a history of hypertension/chronic combined congestive heart failure with an ejection fraction of 40%.  Stable cardiac status.  No evidence of tamponade/congestive heart failure/angina.  Blood pressure under good control.  Continue Norvasc/Coreg/losartan.  Resolved chest vascular congestion.  Not much pleural fluid to tap.  Severe major depression.  On e|[facility] access Center on board.  Patient with poor motivation and she was counseled.  Still poorly  controlled.  Feeding and p.o. intake.  Continues with poor appetite and poor p.o. intake.  On nutritional supplement.  Tolerating soft mechanical diet and thin liquid well.  Currently on cyclic Dobbhoff tube feeds.  Still a major issue.  Calorie count pending today but it is obvious that the patient is not taking enough calorie.  Might require G-tube placement   History of SLE.  CellCept and Plaquenil on hold.  No synovitis.  Anemia and thrombocytopenia.  No active bleed.  Anemia is multifactorial and is secondary to renal failure and postoperative blood loss.  Thrombocytopenia has resolved and was mostly secondary to medications.  Transfuse to keep hemoglobin more than 8.  Hb has been stable.  End-stage renal disease/hypokalemia/hyponatremia hypomagnesemia.  Euvolemic.  Nephrology following and the patient is on peritoneal dialysis.  Will leave electrolyte management for nephrology.    History of seizure disorder/recent MCA stroke.  CNS examination without focal deficit.  No seizures.  Continue Vimpat and Keppra.  EEG is negative for seizure.  MRI of the brain noted.  Neurology started aspirin.  VTE  prophylaxis.  Sequential compression devices.        Discussed my findings and plan of treatment with the patient/nurses  Disposition.  To be determined based on clinical course.  Eventually to skilled facility.        Lakisha Hunt MD  [facility]  11/17/23  15:39 EST         Review of Systems

## 2023-11-17 NOTE — PROGRESS NOTES
[facility] Progress Note      Patient Name: Dee Herrera  : 1990  MRN: [id number]  Primary Care Physician:  Margarita Woods APRN  Date of admission: 10/26/2023    Subjective     Interval History:   Follow-up end-stage renal disease, patient was on peritoneal dialysis but switched to hemodialysis in the hospital    The patient remains obtunded, was switched to 4.25% dialysate yesterday and hope to improve her volume status, her sodium remains the same 130 potassium is low, the patient is slow answering question.  Tolerating her PD and tolerating her tube feed        Review of Systems:   As noted above    Objective     Vitals:   Temp:  [98.7 °F (37.1 °C)-99.8 °F (37.7 °C)] 99.8 °F (37.7 °C)  Heart Rate:  [81-89] 88  Resp:  [16-17] 16  BP: ()/(44-73) 103/53    Intake/Output Summary (Last 24 hours) at 2023 0805  Last data filed at 2023 0700  Gross per 24 hour   Intake 8270 ml   Output 9400 ml   Net -1130 ml       Physical Exam:    General Appearance: Groggy, arousable, chronically ill and frail  Skin: warm and dry  HEENT: Core track in place  Neck: No JVD  Lungs: Scattered rhonchi, unlabored breathing effort  Heart: RRR, no rub   Abdomen: soft, no guarding nondistended, normoactive bowels and PD catheter in place with clean exit site  Extremities: no edema.  Neuro: Moving all extremities    Scheduled Meds:     amLODIPine, 10 mg, Oral, Q24H  [Held by provider] aspirin, 81 mg, Oral, Daily  carvedilol, 25 mg, Oral, Q12H  chlorhexidine, 15 mL, Mouth/Throat, Q12H  Delflex-LC/4.25% Dextrose, 2,000 mL, Intraperitoneal, 5 Exchanges Daily - Dwell Overnight  escitalopram, 10 mg, Oral, Daily  First Mouthwash (Magic Mouthwash), 10 mL, Swish & Spit, Q6H  [Held by provider] heparin (porcine), 5,000 Units, Subcutaneous, Q8H  insulin regular, 2-7 Units, Subcutaneous, Q6H  Lacosamide, 100 mg, Intravenous, Q12H  lidocaine, 10 mL, Subcutaneous, Once  losartan, 25 mg, Oral,  Q24H  mupirocin, , Each Nare, BID  pantoprazole, 40 mg, Intravenous, Q AM      IV Meds:   hold, 1 each      Results Reviewed:   I have personally reviewed the results from the time of this admission to 11/17/2023 08:05 EST     Results from last 7 days   Lab Units 11/17/23  0243 11/16/23  0242 11/15/23  0239 11/14/23  0308   SODIUM mmol/L 130* 130* 130* 134*   POTASSIUM mmol/L 3.2* 3.0* 3.3* 3.7   CHLORIDE mmol/L 94* 96* 98 100   CO2 mmol/L 24.6 24.9 20.7* 24.0   BUN mg/dL 31* 28* 26* 21*   CREATININE mg/dL 4.60* 4.52* 4.03* 3.31*   CALCIUM mg/dL 8.1* 8.4* 8.8 8.9   BILIRUBIN mg/dL  --   --   --  0.4   ALK PHOS U/L  --   --   --  73   ALT (SGPT) U/L  --   --   --  11   AST (SGOT) U/L  --   --   --  28   GLUCOSE mg/dL 189* 130* 112* 124*       Estimated Creatinine Clearance: 14.4 mL/min (A) (by C-G formula based on SCr of 4.6 mg/dL (H)).    Results from last 7 days   Lab Units 11/17/23  0243 11/16/23  0242 11/15/23  0239   MAGNESIUM mg/dL 1.9 1.5* 1.7   PHOSPHORUS mg/dL 2.1* 2.9 2.7             Results from last 7 days   Lab Units 11/17/23  0243 11/16/23  0242 11/15/23  0239 11/14/23  0308 11/13/23  0811   WBC 10*3/mm3 13.12* 11.48* 10.62 9.31 10.66   HEMOGLOBIN g/dL 8.5* 8.9* 8.2* 7.8* 8.0*   PLATELETS 10*3/mm3 154 136* 118* 120* 100*               Assessment / Plan     ASSESSMENT:  End-stage renal disease secondary to lupus nephritis who was on peritoneal dialysis, he was switched temporarily to hemodialysis postoperatively and temporary dialysis catheter was removed yesterday and PD was resumed.  She was switched to 4.25% dialysate yesterday because of volume excess, her sodium remains 130, potassium down to 3.2, magnesium 1.9 and phosphorus 2.1 her albumin is 2.1.    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  She then developed cardiac tamponade and had reexploration done with removal of large clot compressing the right ventricle  Seizure disorder  Hypertension with ESRD, on the low side, I will decrease the amlodipine    PLAN:  Continue the same dialysate 4.25% solution  Replete potassium   Continue fluid restriction 1000 cc per 24 hours  Will not give iron nor YULIANA since it will be ineffective in the presence of inflammation  Surveillance labs    I reviewed the chart and other providers note, I reviewed imaging and lab data.  Copied text in this note has been reviewed and is accurate as of 11/17/23.     Thank you for involving us in the care of Dee BARTHOLOMEW Javier.  Please feel free to call with any questions.    Isaac Diaz MD  11/17/23  08:05 [facility]    [facility]""  [phone]    Please note that portions of this note were completed with a voice recognition program.

## 2023-11-18 LAB
ALBUMIN SERPL-MCNC: 2.4 G/DL (ref 3.5–5.2)
ALP SERPL-CCNC: 74 U/L (ref 39–117)
ALT SERPL W P-5'-P-CCNC: 21 U/L (ref 1–33)
ANION GAP SERPL CALCULATED.3IONS-SCNC: 10 MMOL/L (ref 5–15)
AST SERPL-CCNC: 34 U/L (ref 1–32)
BASOPHILS # BLD AUTO: 0.02 10*3/MM3 (ref 0–0.2)
BASOPHILS NFR BLD AUTO: 0.2 % (ref 0–1.5)
BILIRUB CONJ SERPL-MCNC: <0.2 MG/DL (ref 0–0.3)
BILIRUB INDIRECT SERPL-MCNC: ABNORMAL MG/DL
BILIRUB SERPL-MCNC: 0.2 MG/DL (ref 0–1.2)
BUN SERPL-MCNC: 29 MG/DL (ref 6–20)
BUN/CREAT SERPL: 6.3 (ref 7–25)
CALCIUM SPEC-SCNC: 8.6 MG/DL (ref 8.6–10.5)
CHLORIDE SERPL-SCNC: 94 MMOL/L (ref 98–107)
CO2 SERPL-SCNC: 26 MMOL/L (ref 22–29)
CREAT SERPL-MCNC: 4.64 MG/DL (ref 0.57–1)
DEPRECATED RDW RBC AUTO: 45.6 FL (ref 37–54)
EGFRCR SERPLBLD CKD-EPI 2021: 12.3 ML/MIN/1.73
EOSINOPHIL # BLD AUTO: 0.1 10*3/MM3 (ref 0–0.4)
EOSINOPHIL NFR BLD AUTO: 0.9 % (ref 0.3–6.2)
ERYTHROCYTE [DISTWIDTH] IN BLOOD BY AUTOMATED COUNT: 14.6 % (ref 12.3–15.4)
GLUCOSE BLDC GLUCOMTR-MCNC: 119 MG/DL (ref 70–130)
GLUCOSE BLDC GLUCOMTR-MCNC: 217 MG/DL (ref 70–130)
GLUCOSE BLDC GLUCOMTR-MCNC: 87 MG/DL (ref 70–130)
GLUCOSE SERPL-MCNC: 158 MG/DL (ref 65–99)
HCT VFR BLD AUTO: 27.4 % (ref 34–46.6)
HGB BLD-MCNC: 9.1 G/DL (ref 12–15.9)
LYMPHOCYTES # BLD AUTO: 0.48 10*3/MM3 (ref 0.7–3.1)
LYMPHOCYTES NFR BLD AUTO: 4.4 % (ref 19.6–45.3)
MAGNESIUM SERPL-MCNC: 1.5 MG/DL (ref 1.6–2.6)
MCH RBC QN AUTO: 28.1 PG (ref 26.6–33)
MCHC RBC AUTO-ENTMCNC: 33.2 G/DL (ref 31.5–35.7)
MCV RBC AUTO: 84.6 FL (ref 79–97)
MONOCYTES # BLD AUTO: 0.97 10*3/MM3 (ref 0.1–0.9)
MONOCYTES NFR BLD AUTO: 8.9 % (ref 5–12)
NEUTROPHILS NFR BLD AUTO: 84.9 % (ref 42.7–76)
NEUTROPHILS NFR BLD AUTO: 9.31 10*3/MM3 (ref 1.7–7)
PHOSPHATE SERPL-MCNC: 1.5 MG/DL (ref 2.5–4.5)
PLATELET # BLD AUTO: 172 10*3/MM3 (ref 140–450)
PMV BLD AUTO: 12 FL (ref 6–12)
POTASSIUM SERPL-SCNC: 3.1 MMOL/L (ref 3.5–5.2)
PROT SERPL-MCNC: 5.6 G/DL (ref 6–8.5)
RBC # BLD AUTO: 3.24 10*6/MM3 (ref 3.77–5.28)
SODIUM SERPL-SCNC: 130 MMOL/L (ref 136–145)
WBC NRBC COR # BLD AUTO: 10.96 10*3/MM3 (ref 3.4–10.8)

## 2023-11-18 PROCEDURE — 80048 BASIC METABOLIC PNL TOTAL CA: CPT

## 2023-11-18 PROCEDURE — 84100 ASSAY OF PHOSPHORUS: CPT | Performed by: INTERNAL MEDICINE

## 2023-11-18 PROCEDURE — C9254 INJECTION, LACOSAMIDE: HCPCS | Performed by: NURSE PRACTITIONER

## 2023-11-18 PROCEDURE — 85025 COMPLETE CBC W/AUTO DIFF WBC: CPT | Performed by: INTERNAL MEDICINE

## 2023-11-18 PROCEDURE — 99232 SBSQ HOSP IP/OBS MODERATE 35: CPT | Performed by: INTERNAL MEDICINE

## 2023-11-18 PROCEDURE — 25010000002 MAGNESIUM SULFATE IN D5W 1G/100ML (PREMIX) 1-5 GM/100ML-% SOLUTION: Performed by: INTERNAL MEDICINE

## 2023-11-18 PROCEDURE — 25810000003 SODIUM CHLORIDE 0.9 % SOLUTION: Performed by: INTERNAL MEDICINE

## 2023-11-18 PROCEDURE — 80076 HEPATIC FUNCTION PANEL: CPT | Performed by: INTERNAL MEDICINE

## 2023-11-18 PROCEDURE — 82948 REAGENT STRIP/BLOOD GLUCOSE: CPT

## 2023-11-18 PROCEDURE — 25010000002 LACOSAMIDE 200 MG/20ML SOLUTION: Performed by: NURSE PRACTITIONER

## 2023-11-18 PROCEDURE — 63710000001 INSULIN REGULAR HUMAN PER 5 UNITS: Performed by: NURSE PRACTITIONER

## 2023-11-18 PROCEDURE — 83735 ASSAY OF MAGNESIUM: CPT | Performed by: INTERNAL MEDICINE

## 2023-11-18 PROCEDURE — 99024 POSTOP FOLLOW-UP VISIT: CPT | Performed by: THORACIC SURGERY (CARDIOTHORACIC VASCULAR SURGERY)

## 2023-11-18 RX ORDER — DEXTROSE MONOHYDRATE, SODIUM CHLORIDE, SODIUM LACTATE, CALCIUM CHLORIDE, MAGNESIUM CHLORIDE 2.5; 538; 448; 18.4; 5.08 G/100ML; MG/100ML; MG/100ML; MG/100ML; MG/100ML
2000 SOLUTION INTRAPERITONEAL
Status: DISCONTINUED | OUTPATIENT
Start: 2023-11-18 | End: 2023-11-25

## 2023-11-18 RX ORDER — MAGNESIUM SULFATE 1 G/100ML
1 INJECTION INTRAVENOUS ONCE
Status: COMPLETED | OUTPATIENT
Start: 2023-11-18 | End: 2023-11-18

## 2023-11-18 RX ORDER — CARVEDILOL 12.5 MG/1
12.5 TABLET ORAL EVERY 12 HOURS SCHEDULED
Status: DISCONTINUED | OUTPATIENT
Start: 2023-11-18 | End: 2023-11-21

## 2023-11-18 RX ORDER — FENTANYL/ROPIVACAINE/NS/PF 2-625MCG/1
15 PLASTIC BAG, INJECTION (ML) EPIDURAL
Status: DISPENSED | OUTPATIENT
Start: 2023-11-18 | End: 2023-11-18

## 2023-11-18 RX ADMIN — ASPIRIN 81 MG: 81 TABLET, CHEWABLE ORAL at 08:52

## 2023-11-18 RX ADMIN — LOSARTAN POTASSIUM 25 MG: 25 TABLET, FILM COATED ORAL at 08:53

## 2023-11-18 RX ADMIN — MAGNESIUM SULFATE IN DEXTROSE 1 G: 10 INJECTION, SOLUTION INTRAVENOUS at 17:37

## 2023-11-18 RX ADMIN — MAGNESIUM SULFATE IN DEXTROSE 1 G: 10 INJECTION, SOLUTION INTRAVENOUS at 05:03

## 2023-11-18 RX ADMIN — 0.12% CHLORHEXIDINE GLUCONATE 15 ML: 1.2 RINSE ORAL at 08:53

## 2023-11-18 RX ADMIN — DEXTROSE MONOHYDRATE, SODIUM CHLORIDE, SODIUM LACTATE, CALCIUM CHLORIDE, MAGNESIUM CHLORIDE 2000 ML: 4.25; 538; 448; 18.4; 5.08 SOLUTION INTRAPERITONEAL at 01:14

## 2023-11-18 RX ADMIN — ACETAMINOPHEN 650 MG: 325 TABLET, FILM COATED ORAL at 14:57

## 2023-11-18 RX ADMIN — CARVEDILOL 12.5 MG: 12.5 TABLET, FILM COATED ORAL at 21:07

## 2023-11-18 RX ADMIN — PANTOPRAZOLE SODIUM 40 MG: 40 INJECTION, POWDER, FOR SOLUTION INTRAVENOUS at 07:58

## 2023-11-18 RX ADMIN — MUPIROCIN 1 APPLICATION: 20 OINTMENT TOPICAL at 21:07

## 2023-11-18 RX ADMIN — DEXTROSE MONOHYDRATE, SODIUM CHLORIDE, SODIUM LACTATE, CALCIUM CHLORIDE, MAGNESIUM CHLORIDE 2000 ML: 2.5; 538; 448; 18.4; 5.08 SOLUTION INTRAPERITONEAL at 20:08

## 2023-11-18 RX ADMIN — MUPIROCIN: 20 OINTMENT TOPICAL at 00:42

## 2023-11-18 RX ADMIN — INSULIN HUMAN 3 UNITS: 100 INJECTION, SOLUTION PARENTERAL at 07:58

## 2023-11-18 RX ADMIN — MUPIROCIN: 20 OINTMENT TOPICAL at 08:53

## 2023-11-18 RX ADMIN — POTASSIUM PHOSPHATE, MONOBASIC POTASSIUM PHOSPHATE, DIBASIC 15 MMOL: 224; 236 INJECTION, SOLUTION, CONCENTRATE INTRAVENOUS at 09:29

## 2023-11-18 RX ADMIN — LACOSAMIDE 100 MG: 10 INJECTION INTRAVENOUS at 12:13

## 2023-11-18 RX ADMIN — DEXTROSE MONOHYDRATE, SODIUM CHLORIDE, SODIUM LACTATE, CALCIUM CHLORIDE, MAGNESIUM CHLORIDE 2000 ML: 4.25; 538; 448; 18.4; 5.08 SOLUTION INTRAPERITONEAL at 08:53

## 2023-11-18 RX ADMIN — DEXTROSE MONOHYDRATE, SODIUM CHLORIDE, SODIUM LACTATE, CALCIUM CHLORIDE, MAGNESIUM CHLORIDE 2000 ML: 4.25; 538; 448; 18.4; 5.08 SOLUTION INTRAPERITONEAL at 05:38

## 2023-11-18 RX ADMIN — ESCITALOPRAM OXALATE 10 MG: 10 TABLET, FILM COATED ORAL at 08:53

## 2023-11-18 RX ADMIN — DEXTROSE MONOHYDRATE, SODIUM CHLORIDE, SODIUM LACTATE, CALCIUM CHLORIDE, MAGNESIUM CHLORIDE 2000 ML: 2.5; 538; 448; 18.4; 5.08 SOLUTION INTRAPERITONEAL at 14:53

## 2023-11-18 RX ADMIN — INSULIN HUMAN 2 UNITS: 100 INJECTION, SOLUTION PARENTERAL at 00:41

## 2023-11-18 NOTE — PROGRESS NOTES
Name: Dee Herrera ADMIT: 10/26/2023   : 1990  PCP: Margarita Woods APRN    MRN: [id number] LOS: 23 days   AGE/SEX: 33 y.o. female  ROOM: [facility]     Subjective   Subjective   No overnight events.  No change in the situation.  Continues to have no motivation still with poor p.o. intake..  Patient refuses to answer questions today.  Notation of low blood pressure.      Objective   Objective   Vital Signs  Temp:  [97.2 °F (36.2 °C)-98.9 °F (37.2 °C)] 98.5 °F (36.9 °C)  Heart Rate:  [84-95] 88  Resp:  [18] 18  BP: ()/(53-67) 103/67  SpO2:  [97 %-100 %] 97 %  on   ;   Device (Oxygen Therapy): room air    Intake/Output Summary (Last 24 hours) at 2023 1817  Last data filed at 2023 1400  Gross per 24 hour   Intake 4270 ml   Output 7150 ml   Net -2880 ml     Body mass index is 19.01 kg/m².      23  0657 23  0651 23  0500   Weight: 50.8 kg (112 lb 1.6 oz) 51.3 kg (113 lb 3.2 oz) 51.8 kg (114 lb 1.6 oz)     Physical Exam  General.  Middle-aged female.  Somnolent.  Lethargic.  No apparent pain/distress/diaphoresis.  Appears depressed with flat affect.  eyes.  Pupils equal round and reactive.    No pallor or jaundice  Oral cavity.  Moist mucous membrane.  Neck.  Supple.  No JVD.  No lymphadenopathy or thyromegaly.  Cardiovascular.  Regular rate and rhythm with grade 2 systolic murmur.  Healthy sternotomy scar.    Chest.  Poor bilateral air entry with no added sounds  Abdomen.  Soft lax.  No tenderness.  No organomegaly.  No guarding or rebound.  Peritoneal catheter in place.  Extremities.  No clubbing/cyanosis/edema.  CNS.  No acute focal neurological deficits.      Results Review:      Results from last 7 days   Lab Units 23  0330 23  0243 23  0242 11/15/23  0239 23  0308 23  0258 23  0359   SODIUM mmol/L 130* 130* 130* 130* 134* 133* 134*   POTASSIUM mmol/L 3.1* 3.2* 3.0* 3.3* 3.7 3.5 3.6   CHLORIDE mmol/L 94* 94* 96* 98 100 99 99   CO2  "mmol/L 26.0 24.6 24.9 20.7* 24.0 22.0 22.0   BUN mg/dL 29* 31* 28* 26* 21* 32* 21*   CREATININE mg/dL 4.64* 4.60* 4.52* 4.03* 3.31* 4.34* 3.16*   GLUCOSE mg/dL 158* 189* 130* 112* 124* 118* 106*   CALCIUM mg/dL 8.6 8.1* 8.4* 8.8 8.9 8.5* 8.3*   AST (SGOT) U/L 34*  --   --   --  28  --   --    ALT (SGPT) U/L 21  --   --   --  11  --   --      Estimated Creatinine Clearance: 14.4 mL/min (A) (by C-G formula based on SCr of 4.64 mg/dL (H)).      Results from last 7 days   Lab Units 11/18/23  1757 11/18/23  1206 11/18/23  0616 11/17/23  2350 11/17/23  1557 11/17/23  1156 11/17/23  0624 11/17/23  0045   GLUCOSE mg/dL 119 87 217* 159* 90 117 106 155*             Results from last 7 days   Lab Units 11/13/23  0258   TSH uIU/mL 5.990*     Results from last 7 days   Lab Units 11/18/23  0330 11/17/23  0243 11/16/23  0242 11/15/23  0239 11/14/23  0308   MAGNESIUM mg/dL 1.5* 1.9 1.5* 1.7 1.7   PHOSPHORUS mg/dL 1.5* 2.1* 2.9 2.7 2.0*           Invalid input(s): \"LDLCALC\"  Results from last 7 days   Lab Units 11/18/23  0330 11/17/23  0243 11/16/23  0242 11/15/23  0239 11/14/23  0308 11/13/23  0811 11/13/23  0811 11/12/23  0359   WBC 10*3/mm3 10.96* 13.12* 11.48* 10.62 9.31  --  10.66 11.93*   HEMOGLOBIN g/dL 9.1* 8.5* 8.9* 8.2* 7.8*  --  8.0* 8.0*   HEMATOCRIT % 27.4* 26.8* 26.8* 24.6* 24.2*  --  23.8* 23.9*   PLATELETS 10*3/mm3 172 154 136* 118* 120*  --  100* 93*   MCV fL 84.6 88.4 85.9 86.0 87.4  --  85.0 84.5   MCH pg 28.1 28.1 28.5 28.7 28.2  --  28.6 28.3   MCHC g/dL 33.2 31.7 33.2 33.3 32.2  --  33.6 33.5   RDW % 14.6 14.7 14.7 14.6 14.8  --  15.1 15.2   RDW-SD fl 45.6 48.3 46.7 46.2 47.8  --  46.3 46.6   MPV fL 12.0 13.0*  --  13.4*  --   --   --  13.3*   NEUTROPHIL % % 84.9*  --  86.4* 84.2* 83.9*   < >  --   --    LYMPHOCYTE % % 4.4*  --  3.3* 3.6* 4.0*   < >  --   --    MONOCYTES % % 8.9  --  8.7 10.5 10.5   < >  --   --    EOSINOPHIL % % 0.9  --  0.5 0.3 0.1*   < >  --   --    BASOPHIL % % 0.2  --  0.2 0.3 0.1   < >  " --   --    NEUTROS ABS 10*3/mm3 9.31* 12.33* 9.92* 8.94* 7.81*   < >  --   --    LYMPHS ABS 10*3/mm3 0.48*  --  0.38* 0.38* 0.37*   < >  --   --    MONOS ABS 10*3/mm3 0.97*  --  1.00* 1.12* 0.98*   < >  --   --    EOS ABS 10*3/mm3 0.10  --  0.06 0.03 0.01   < >  --   --    BASOS ABS 10*3/mm3 0.02  --  0.02 0.03 0.01   < >  --   --    NRBC /100 WBC  --  0.0  --   --   --   --   --   --     < > = values in this interval not displayed.         Results from last 7 days   Lab Units 11/13/23  0809   PH, ARTERIAL pH units 7.527*   PO2 ART mm Hg 73.0*   PCO2, ARTERIAL mm Hg 25.8*   HCO3 ART mmol/L 21.5*               Results from last 7 days   Lab Units 11/14/23  0308   AMMONIA umol/L 21     Results from last 7 days   Lab Units 11/12/23  1924 11/12/23  1524   BLOODCX  No growth at 5 days No growth at 5 days                   Imaging:  Imaging Results (Last 24 Hours)       ** No results found for the last 24 hours. **               I reviewed the patient's new clinical results / labs / tests / procedures      Assessment/Plan     Active Hospital Problems    Diagnosis  POA    **Dissecting ascending aortic aneurysm [I71.010]  Yes    Recent cerebrovascular accident (CVA) [Z86.73]  Yes    Aortic valve lesion [I35.8]  Yes    Severe aortic valve regurgitation [I35.1]  Yes    Hyponatremia [E87.1]  Yes    Poor appetite [R63.0]  Yes    Moderate malnutrition [E44.0]  Yes    Chronic diastolic CHF (congestive heart failure) [I50.32]  Yes    Anemia due to chronic kidney disease, on chronic dialysis [N18.6, D63.1, Z99.2]  Not Applicable    Peritoneal dialysis catheter in place [Z99.2]  Not Applicable    Essential hypertension [I10]  Yes    End stage renal disease on dialysis [N18.6, Z99.2]  Not Applicable    Seizure disorder [G40.909]  Yes    Elevated liver function tests [R79.89]  Yes    Pericardial effusion [I31.39]  Yes    Systemic lupus erythematosus [M32.9]  Yes    Thrombocytopenia [D69.6]  Yes    Hypertension secondary to other renal  disorders [I15.1]  Yes    Pancytopenia [D61.818]  Yes    Vitamin D deficiency [E55.9]  Yes      Resolved Hospital Problems    Diagnosis Date Resolved POA    Abdominal pain [R10.9] 10/28/2023 Yes           Ascending aortic aneurysm dissection s/p repair (postoperative day #13) complicated by cardiac tamponade status post reexploration for bleeding after removal of a large clot compressing right ventricle (postoperative day #8)/severe aortic valve insufficiency status postrepair in a patient with a history of hypertension/chronic combined congestive heart failure with an ejection fraction of 40%.  Stable cardiac status.  No evidence of tamponade/congestive heart failure/angina.  Blood pressure is low and cardiology and nephrology DC'd losartan and Norvasc.  Continue Coreg.  Resolved chest vascular congestion.  Not much pleural fluid to tap.  Severe major depression.  On Lexapro access Center on board.  Patient with poor motivation and she was counseled.  Still poorly controlled.  Feeding and p.o. intake.  Continues with poor appetite and poor p.o. intake.  On nutritional supplement.  Tolerating soft mechanical diet and thin liquid well.  Currently on cyclic Dobbhoff tube feeds.  Still a major issue.  Calorie count pending today but it is obvious that the patient is not taking enough calorie.  Might require G-tube placement   History of SLE.  CellCept and Plaquenil on hold.  No synovitis.  Anemia and thrombocytopenia.  No active bleed.  Anemia is multifactorial and is secondary to renal failure and postoperative blood loss.  Thrombocytopenia has resolved.  Transfuse if hemoglobin is less than 8.    End-stage renal disease/hypokalemia/hyponatremia hypomagnesemia.  Euvolemic.  Nephrology following and the patient is on peritoneal dialysis.  Will leave electrolyte management for nephrology.    History of seizure disorder/recent MCA stroke.  CNS examination without focal deficit.  No seizures.  Continue Vimpat and Keppra.   EEG is negative for seizure.  MRI of the brain noted.  Neurology started aspirin.  VTE  prophylaxis.  Sequential compression devices.        Discussed my findings and plan of treatment with the patient/nurses  Disposition.  To be determined based on clinical course.  Eventually to skilled facility.        Lakisha Hunt MD  [facility]  11/18/23  18:17 EST         Review of Systems

## 2023-11-18 NOTE — PROGRESS NOTES
[facility] Follow Up    Chief Complaint: Follow up aortic dissection, aortic valve regurgitation    Interval History: Still lethargic and not very interactive.  Will answer questions.  Blood pressures relatively low overnight and this morning.    Objective:     Objective:  Temp:  [98.7 °F (37.1 °C)-99 °F (37.2 °C)] 98.7 °F (37.1 °C)  Heart Rate:  [84-95] 93  Resp:  [16-18] 18  BP: (85-94)/(53-60) 91/57     Intake/Output Summary (Last 24 hours) at 11/18/2023 0758  Last data filed at 11/18/2023 0538  Gross per 24 hour   Intake 8015 ml   Output 63060 ml   Net -4335 ml     Body mass index is 19.01 kg/m².      11/16/23  0657 11/17/23  0651 11/18/23  0500   Weight: 50.8 kg (112 lb 1.6 oz) 51.3 kg (113 lb 3.2 oz) 51.8 kg (114 lb 1.6 oz)     Weight change: 0.408 kg (14.4 oz)      Physical Exam:   General : Alert, cooperative, in no acute distress.  Neuro: Alert,cooperative and oriented.  Lungs: CTAB. Normal respiratory effort and rate.  CV: Regular rate and rhythm, normal S1 and S2, no murmurs, gallops or rubs.  ABD: Soft, nontender, nondistended. Positive bowel sounds.  Extr: No edema or cyanosis, moves all extremities.    Lab Review:   Results from last 7 days   Lab Units 11/18/23  0330 11/17/23  0243 11/15/23  0239 11/14/23  0308   SODIUM mmol/L 130* 130*   < > 134*   POTASSIUM mmol/L 3.1* 3.2*   < > 3.7   CHLORIDE mmol/L 94* 94*   < > 100   CO2 mmol/L 26.0 24.6   < > 24.0   BUN mg/dL 29* 31*   < > 21*   CREATININE mg/dL 4.64* 4.60*   < > 3.31*   GLUCOSE mg/dL 158* 189*   < > 124*   CALCIUM mg/dL 8.6 8.1*   < > 8.9   AST (SGOT) U/L 34*  --   --  28   ALT (SGPT) U/L 21  --   --  11    < > = values in this interval not displayed.         Results from last 7 days   Lab Units 11/18/23  0330 11/17/23  0243   WBC 10*3/mm3 10.96* 13.12*   HEMOGLOBIN g/dL 9.1* 8.5*   HEMATOCRIT % 27.4* 26.8*   PLATELETS 10*3/mm3 172 154         Results from last 7 days   Lab Units 11/18/23  0330 11/17/23  0243   MAGNESIUM  "mg/dL 1.5* 1.9           Invalid input(s): \"LDLCALC\"      Results from last 7 days   Lab Units 11/13/23  0258   TSH uIU/mL 5.990*     I reviewed the patient's new clinical results.  I personally viewed and interpreted the patient's EKG  Current Medications:   Scheduled Meds:amLODIPine, 5 mg, Oral, Q24H  aspirin, 81 mg, Oral, Daily  carvedilol, 25 mg, Oral, Q12H  chlorhexidine, 15 mL, Mouth/Throat, Q12H  Delflex-LC/4.25% Dextrose, 2,000 mL, Intraperitoneal, 5 Exchanges Daily - Dwell Overnight  escitalopram, 10 mg, Oral, Daily  First Mouthwash (Magic Mouthwash), 10 mL, Swish & Spit, Q6H  [Held by provider] heparin (porcine), 5,000 Units, Subcutaneous, Q8H  insulin regular, 2-7 Units, Subcutaneous, Q6H  Lacosamide, 100 mg, Intravenous, Q12H  lidocaine, 10 mL, Subcutaneous, Once  losartan, 25 mg, Oral, Q24H  mupirocin, , Each Nare, BID  pantoprazole, 40 mg, Intravenous, Q AM  potassium phosphate, 15 mmol, Intravenous, Q3H      Continuous Infusions:hold, 1 each        Allergies:  Allergies   Allergen Reactions    Minoxidil Hives and Other (See Comments)     Pericardial effusion .       Assessment/Plan:     Ascending aortic aneurysm with subacute/chronic aortic root dissection.status post repair of proximal aortic on 11/2.   Severe aortic regurgitation.  Status post repair on 11/2.   Cardiac tamponade: secondary to pericardial thrombus anteriorly and compressing right ventricle.  Underwent eexploration with clot removal and treatment of a small amount of bleeding at the suture line on 11/6.  Cardiogenic shock.  due to #3. Resolved  Seizures.  First noted postoperatively.  He is on antiepileptic medications.  Keppra discontinued to see if this will help her mood neurology.  Continued on Vimpat.  ESRD.  secondary to lupus nephritis.  Initially on hemodialysis postoperatively.  Now back on peripheral dialysis.    Hyponatremia-mild and persistent.  History of hypertension.  Now blood pressures are low.    Chronic anemia.  " stable  Systemic lupus erythematosus.  Therapy with CellCept and Plaquenil on hold.  Depression.  Access center has seen patient. At this point, she denies any depression. She was started on escitalopram.  Discontinuation of Keppra as above to see if this will help with her mood.  Right MCA stroke.  Noted on MRI.  Neurology believes will recover fully from this.    -Discontinue amlodipine.  Plan to discontinue losartan also per nephrology.  I will also go ahead and decrease her carvedilol dosage to 12.5 mg for now.  -Continue PT OT as tolerates.  I encouraged the patient to continue working with them.  - Still with Dobbhoff tube for nighttime feeding to help with nutrition since her appetite remains poor.    Juana Taylor MD  11/18/23  07:58 EST

## 2023-11-18 NOTE — PROGRESS NOTES
[facility] Progress Note      Patient Name: Dee Herrera  : 1990  MRN: [id number]  Primary Care Physician:  Margarita Woods APRN  Date of admission: 10/26/2023    Subjective     Interval History:   F/u ESRD     Review of Systems:   Affect remains flat and not speaking; access following; no PD issues per RN  BP been low 80s to 90s systolic    Objective     Vitals:   Temp:  [97.2 °F (36.2 °C)-99 °F (37.2 °C)] 97.2 °F (36.2 °C)  Heart Rate:  [84-95] 90  Resp:  [16-18] 18  BP: (85-97)/(53-66) 97/66    Intake/Output Summary (Last 24 hours) at 2023 1050  Last data filed at 2023 0813  Gross per 24 hour   Intake 8240 ml   Output 02818 ml   Net -4110 ml       Physical Exam:    General Appearance: frail AAF not speaking, no distress +DHT  Neck: supple, no JVD  Lungs: CTA bilat   Heart: RRR, normal S1 and S2  Abdomen: soft, nontender, +PDC  Extremities: no edema, cyanosis or clubbing      Scheduled Meds:     aspirin, 81 mg, Oral, Daily  carvedilol, 25 mg, Oral, Q12H  chlorhexidine, 15 mL, Mouth/Throat, Q12H  Delflex-LC/4.25% Dextrose, 2,000 mL, Intraperitoneal, 5 Exchanges Daily - Dwell Overnight  escitalopram, 10 mg, Oral, Daily  First Mouthwash (Magic Mouthwash), 10 mL, Swish & Spit, Q6H  [Held by provider] heparin (porcine), 5,000 Units, Subcutaneous, Q8H  insulin regular, 2-7 Units, Subcutaneous, Q6H  Lacosamide, 100 mg, Intravenous, Q12H  lidocaine, 10 mL, Subcutaneous, Once  magnesium sulfate, 1 g, Intravenous, Once  mupirocin, , Each Nare, BID  pantoprazole, 40 mg, Intravenous, Q AM  potassium phosphate, 15 mmol, Intravenous, Q3H      IV Meds:   hold, 1 each        Results Reviewed:   I have personally reviewed the results from the time of this admission to 2023 10:50 EST     Results from last 7 days   Lab Units 23  0330 23  0243 23  0242 11/15/23  0239 23  0308   SODIUM mmol/L 130* 130* 130*   < > 134*   POTASSIUM mmol/L 3.1* 3.2*  3.0*   < > 3.7   CHLORIDE mmol/L 94* 94* 96*   < > 100   CO2 mmol/L 26.0 24.6 24.9   < > 24.0   BUN mg/dL 29* 31* 28*   < > 21*   CREATININE mg/dL 4.64* 4.60* 4.52*   < > 3.31*   CALCIUM mg/dL 8.6 8.1* 8.4*   < > 8.9   BILIRUBIN mg/dL 0.2  --   --   --  0.4   ALK PHOS U/L 74  --   --   --  73   ALT (SGPT) U/L 21  --   --   --  11   AST (SGOT) U/L 34*  --   --   --  28   GLUCOSE mg/dL 158* 189* 130*   < > 124*    < > = values in this interval not displayed.     Estimated Creatinine Clearance: 14.4 mL/min (A) (by C-G formula based on SCr of 4.64 mg/dL (H)).  Results from last 7 days   Lab Units 11/18/23  0330 11/17/23  0243 11/16/23  0242   MAGNESIUM mg/dL 1.5* 1.9 1.5*   PHOSPHORUS mg/dL 1.5* 2.1* 2.9         Results from last 7 days   Lab Units 11/18/23  0330 11/17/23  0243 11/16/23  0242 11/15/23  0239 11/14/23  0308   WBC 10*3/mm3 10.96* 13.12* 11.48* 10.62 9.31   HEMOGLOBIN g/dL 9.1* 8.5* 8.9* 8.2* 7.8*   PLATELETS 10*3/mm3 172 154 136* 118* 120*           Assessment / Plan     ASSESSMENT:  End-stage renal disease secondary to lupus nephritis who was on peritoneal dialysis, was switched temporarily to hemodialysis postoperatively and now back on PD, with removal of dialysis catheter.  She was switched to 4.25% dialysate couple days ago because of volume excess, but now hypotensive 80s to 90s systolic - back off UF.  K, Phos, Mg low with malnutrition.  Mild hyponatremia stable on fluid restriction   HFpEF - echo 11/6 with hyperdynamic LV, EF > 70%  Vol overload - improved, though CXR 11/16 with vasc italo + effusions, minimal fluid on attempted right thoracentesis (not performed)  Anemia of chronic kidney disease hemoglobin today is 9.1, her ferritin is very high related to inflammation and has other parameters suggestive of iron deficiency and also chronic disease  SLE  Ascending aortic aneurysm with dissection, POD15 repair, c/b cardiac tamonade and reexploration of bleeding, POD11   Hypotension (hx HTN) -  related in part to more aggressive UF with PD.  Scale back antihypertensives further   Nutrition: nocturnal TFs and poor PO intake during day per RN    PLAN:  K Phos 30 mmol IV + KCL 20meq PO x 1  Mg sulfate 2gm IV   Change PD soln back to 2.5%   DC losartan   Defer coreg adjustment to [facility] d/w Dr Taylor   D/W RN      Lance Martínez MD  11/18/23  10:50 EST    [facility]  [phone]

## 2023-11-18 NOTE — NURSING NOTE
"Access Center follow-up d/t depression.     Patient RIB with eyes closed. The patient did not open her eyes or respond verbally during visit. When asked about anxiety and depression the patient shook her head \"no.\" The patient was encouraged Access Center is here to support her and listen if she would ever like to talk. The patient was able to work with PT yesterday. Possible [facility] at discharge. Access following.   "

## 2023-11-18 NOTE — PROGRESS NOTES
" LOS: 23 days   Patient Care Team:  Margarita Woods APRN as PCP - General (Nurse Practitioner)  Winnie Sanchez MD as Referring Physician (Obstetrics and Gynecology)  Norberto Almaraz MD PhD as Consulting Physician (Hematology and Oncology)  Lupis Thomas MD as Consulting Physician (Nephrology)  Hair Mckeon MD as Consulting Physician (Nephrology)  Juana Taylor MD as Consulting Physician (Cardiology)    Chief Complaint: post op    Subjective:  Symptoms:  Stable.  No shortness of breath, cough or chest pain.    Diet:  Poor intake.  No nausea or vomiting.    Activity level: Impaired due to weakness.    Pain:  She reports no pain.      Wants pancakes    Vital Signs  Temp:  [97.2 °F (36.2 °C)-99 °F (37.2 °C)] 97.2 °F (36.2 °C)  Heart Rate:  [84-95] 90  Resp:  [16-18] 18  BP: (85-97)/(53-66) 97/66  Body mass index is 19.01 kg/m².    Intake/Output Summary (Last 24 hours) at 11/18/2023 1110  Last data filed at 11/18/2023 0813  Gross per 24 hour   Intake 8240 ml   Output 33102 ml   Net -4110 ml     I/O this shift:  In: 240 [P.O.:240]  Out: -           11/16/23  0657 11/17/23  0651 11/18/23  0500   Weight: 50.8 kg (112 lb 1.6 oz) 51.3 kg (113 lb 3.2 oz) 51.8 kg (114 lb 1.6 oz)         Objective:  General Appearance:  Comfortable and in no acute distress.    Vital signs: (most recent): Blood pressure 97/66, pulse 90, temperature 97.2 °F (36.2 °C), temperature source Oral, resp. rate 18, height 165 cm (64.96\"), weight 51.8 kg (114 lb 1.6 oz), last menstrual period 08/10/2022, SpO2 97%, not currently breastfeeding.  Vital signs are normal.  No fever.    Output: No urine output and producing stool.    Lungs:  Normal effort and normal respiratory rate.  There are decreased breath sounds.    Heart: Normal rate.  Regular rhythm.  (SR on tele monitor)  Abdomen: Abdomen is soft and non-distended.  Bowel sounds are normal.     Pulses: Distal pulses are intact.    Neurological: Patient is oriented to person, " "place and time.  (drowsy).    Skin:  Warm and dry.  (Sternal dressing clean, dry, and intact)          Results Review:        WBC WBC   Date Value Ref Range Status   11/18/2023 10.96 (H) 3.40 - 10.80 10*3/mm3 Final   11/17/2023 13.12 (H) 3.40 - 10.80 10*3/mm3 Final   11/16/2023 11.48 (H) 3.40 - 10.80 10*3/mm3 Final      HGB Hemoglobin   Date Value Ref Range Status   11/18/2023 9.1 (L) 12.0 - 15.9 g/dL Final   11/17/2023 8.5 (L) 12.0 - 15.9 g/dL Final   11/16/2023 8.9 (L) 12.0 - 15.9 g/dL Final      HCT Hematocrit   Date Value Ref Range Status   11/18/2023 27.4 (L) 34.0 - 46.6 % Final   11/17/2023 26.8 (L) 34.0 - 46.6 % Final   11/16/2023 26.8 (L) 34.0 - 46.6 % Final      Platelets Platelets   Date Value Ref Range Status   11/18/2023 172 140 - 450 10*3/mm3 Final   11/17/2023 154 140 - 450 10*3/mm3 Final   11/16/2023 136 (L) 140 - 450 10*3/mm3 Final        PT/INR:  No results found for: \"PROTIME\"/No results found for: \"INR\"    Sodium Sodium   Date Value Ref Range Status   11/18/2023 130 (L) 136 - 145 mmol/L Final   11/17/2023 130 (L) 136 - 145 mmol/L Final   11/16/2023 130 (L) 136 - 145 mmol/L Final      Potassium Potassium   Date Value Ref Range Status   11/18/2023 3.1 (L) 3.5 - 5.2 mmol/L Final   11/17/2023 3.2 (L) 3.5 - 5.2 mmol/L Final   11/16/2023 3.0 (L) 3.5 - 5.2 mmol/L Final      Chloride Chloride   Date Value Ref Range Status   11/18/2023 94 (L) 98 - 107 mmol/L Final   11/17/2023 94 (L) 98 - 107 mmol/L Final   11/16/2023 96 (L) 98 - 107 mmol/L Final      Bicarbonate CO2   Date Value Ref Range Status   11/18/2023 26.0 22.0 - 29.0 mmol/L Final   11/17/2023 24.6 22.0 - 29.0 mmol/L Final   11/16/2023 24.9 22.0 - 29.0 mmol/L Final      BUN BUN   Date Value Ref Range Status   11/18/2023 29 (H) 6 - 20 mg/dL Final   11/17/2023 31 (H) 6 - 20 mg/dL Final   11/16/2023 28 (H) 6 - 20 mg/dL Final      Creatinine Creatinine   Date Value Ref Range Status   11/18/2023 4.64 (H) 0.57 - 1.00 mg/dL Final   11/17/2023 4.60 (H) " 0.57 - 1.00 mg/dL Final   11/16/2023 4.52 (H) 0.57 - 1.00 mg/dL Final      Calcium Calcium   Date Value Ref Range Status   11/18/2023 8.6 8.6 - 10.5 mg/dL Final   11/17/2023 8.1 (L) 8.6 - 10.5 mg/dL Final   11/16/2023 8.4 (L) 8.6 - 10.5 mg/dL Final      Magnesium Magnesium   Date Value Ref Range Status   11/18/2023 1.5 (L) 1.6 - 2.6 mg/dL Final   11/17/2023 1.9 1.6 - 2.6 mg/dL Final   11/16/2023 1.5 (L) 1.6 - 2.6 mg/dL Final          aspirin, 81 mg, Oral, Daily  carvedilol, 25 mg, Oral, Q12H  chlorhexidine, 15 mL, Mouth/Throat, Q12H  Delflex-LC/2.5% Dextrose, 2,000 mL, Intraperitoneal, 5 Exchanges Daily - Dwell Overnight  escitalopram, 10 mg, Oral, Daily  First Mouthwash (Magic Mouthwash), 10 mL, Swish & Spit, Q6H  [Held by provider] heparin (porcine), 5,000 Units, Subcutaneous, Q8H  insulin regular, 2-7 Units, Subcutaneous, Q6H  Lacosamide, 100 mg, Intravenous, Q12H  lidocaine, 10 mL, Subcutaneous, Once  magnesium sulfate, 1 g, Intravenous, Once  mupirocin, , Each Nare, BID  pantoprazole, 40 mg, Intravenous, Q AM  potassium phosphate, 15 mmol, Intravenous, Q3H      hold, 1 each      Assessment & Plan  - Ascending aortic aneurysm with dissection- s/p ascending aortic dissection repair/proximal replacement, gacron interposition graft, kelli procedure; insertion of right femoral shiley---POD#16; Pagni  - Cardiac tamponade- reexploration for bleeding, removal of large clot compressing the RV---POD#12; Camporrotondo  - severe aortic valve insufficiency  - Encephalopathy, improving   - ESRD on PD  - Lupus--on Cellcept/plaquenil; currently held  - Epilepsy  - chronic immunosuppression  - hypertension  - chronic anemia  - TCP--chronic; improving  - Depression--started on lexapro 11/14  - right MCA CVA--continue ASA per neuro     Remains withdrawn, was able to eat some yesterday. She wants pancakes. Will plan to bring her some tomorrow   Plan for follow up CXR tomorrow morning  Remains on nocturnal feeds. Encourage increase  in oral intake.   Remains in NSR. BP on the soft side today. Agree with discontinuation of losartan  MRI yesterday showed right MCA CVA. Neuro recommends continuation of baby ASA. Remains on Vimpat for seizures  On PD--managed per nephrology   She is very weak. Continue aggressive PT/OT  CCP working on d/ plans  Continue supportive care    Katherine Caal, CAESAR  11/18/23  11:10 EST

## 2023-11-18 NOTE — SIGNIFICANT NOTE
11/18/23 1451   OTHER   Discipline physical therapist   Rehab Time/Intention   Session Not Performed other (see comments)  (pt refuse PT this date, shakes head no repeatedly and does not open eyes. PT follow up)   Recommendation   PT - Next Appointment 11/19/23

## 2023-11-18 NOTE — PLAN OF CARE
Goal Outcome Evaluation:  Plan of Care Reviewed With: patient        Progress: improving  Outcome Evaluation: VSS, AOx4. No new issues today, poor appetite and fluid intake. Abd pain treated per MAR. PD per orders performed today, tolerated well. WCTM.

## 2023-11-19 ENCOUNTER — APPOINTMENT (OUTPATIENT)
Dept: GENERAL RADIOLOGY | Facility: HOSPITAL | Age: 33
DRG: 219 | End: 2023-11-19
Payer: MEDICARE

## 2023-11-19 LAB
ALBUMIN SERPL-MCNC: 2.1 G/DL (ref 3.5–5.2)
ANION GAP SERPL CALCULATED.3IONS-SCNC: 10.9 MMOL/L (ref 5–15)
BASOPHILS # BLD AUTO: 0.03 10*3/MM3 (ref 0–0.2)
BASOPHILS NFR BLD AUTO: 0.3 % (ref 0–1.5)
BUN SERPL-MCNC: 27 MG/DL (ref 6–20)
BUN/CREAT SERPL: 6 (ref 7–25)
CALCIUM SPEC-SCNC: 8.2 MG/DL (ref 8.6–10.5)
CHLORIDE SERPL-SCNC: 92 MMOL/L (ref 98–107)
CO2 SERPL-SCNC: 25.1 MMOL/L (ref 22–29)
CREAT SERPL-MCNC: 4.5 MG/DL (ref 0.57–1)
DEPRECATED RDW RBC AUTO: 44.1 FL (ref 37–54)
EGFRCR SERPLBLD CKD-EPI 2021: 12.7 ML/MIN/1.73
EOSINOPHIL # BLD AUTO: 0.14 10*3/MM3 (ref 0–0.4)
EOSINOPHIL NFR BLD AUTO: 1.5 % (ref 0.3–6.2)
ERYTHROCYTE [DISTWIDTH] IN BLOOD BY AUTOMATED COUNT: 14.4 % (ref 12.3–15.4)
GLUCOSE BLDC GLUCOMTR-MCNC: 111 MG/DL (ref 70–130)
GLUCOSE BLDC GLUCOMTR-MCNC: 132 MG/DL (ref 70–130)
GLUCOSE BLDC GLUCOMTR-MCNC: 142 MG/DL (ref 70–130)
GLUCOSE BLDC GLUCOMTR-MCNC: 169 MG/DL (ref 70–130)
GLUCOSE SERPL-MCNC: 107 MG/DL (ref 65–99)
HCT VFR BLD AUTO: 28.3 % (ref 34–46.6)
HGB BLD-MCNC: 9.5 G/DL (ref 12–15.9)
IMM GRANULOCYTES # BLD AUTO: 0.07 10*3/MM3 (ref 0–0.05)
IMM GRANULOCYTES NFR BLD AUTO: 0.7 % (ref 0–0.5)
LYMPHOCYTES # BLD AUTO: 0.58 10*3/MM3 (ref 0.7–3.1)
LYMPHOCYTES NFR BLD AUTO: 6.2 % (ref 19.6–45.3)
MAGNESIUM SERPL-MCNC: 2 MG/DL (ref 1.6–2.6)
MCH RBC QN AUTO: 28.4 PG (ref 26.6–33)
MCHC RBC AUTO-ENTMCNC: 33.6 G/DL (ref 31.5–35.7)
MCV RBC AUTO: 84.5 FL (ref 79–97)
MONOCYTES # BLD AUTO: 1.06 10*3/MM3 (ref 0.1–0.9)
MONOCYTES NFR BLD AUTO: 11.2 % (ref 5–12)
NEUTROPHILS NFR BLD AUTO: 7.55 10*3/MM3 (ref 1.7–7)
NEUTROPHILS NFR BLD AUTO: 80.1 % (ref 42.7–76)
NRBC BLD AUTO-RTO: 0 /100 WBC (ref 0–0.2)
PHOSPHATE SERPL-MCNC: 4.5 MG/DL (ref 2.5–4.5)
PLATELET # BLD AUTO: 180 10*3/MM3 (ref 140–450)
PMV BLD AUTO: 12 FL (ref 6–12)
POTASSIUM SERPL-SCNC: 3.9 MMOL/L (ref 3.5–5.2)
RBC # BLD AUTO: 3.35 10*6/MM3 (ref 3.77–5.28)
SODIUM SERPL-SCNC: 128 MMOL/L (ref 136–145)
WBC NRBC COR # BLD AUTO: 9.43 10*3/MM3 (ref 3.4–10.8)

## 2023-11-19 PROCEDURE — 99024 POSTOP FOLLOW-UP VISIT: CPT | Performed by: THORACIC SURGERY (CARDIOTHORACIC VASCULAR SURGERY)

## 2023-11-19 PROCEDURE — 99232 SBSQ HOSP IP/OBS MODERATE 35: CPT | Performed by: INTERNAL MEDICINE

## 2023-11-19 PROCEDURE — 82948 REAGENT STRIP/BLOOD GLUCOSE: CPT

## 2023-11-19 PROCEDURE — 99221 1ST HOSP IP/OBS SF/LOW 40: CPT | Performed by: SURGERY

## 2023-11-19 PROCEDURE — 25010000002 LACOSAMIDE 200 MG/20ML SOLUTION: Performed by: NURSE PRACTITIONER

## 2023-11-19 PROCEDURE — 83735 ASSAY OF MAGNESIUM: CPT | Performed by: INTERNAL MEDICINE

## 2023-11-19 PROCEDURE — 71045 X-RAY EXAM CHEST 1 VIEW: CPT

## 2023-11-19 PROCEDURE — C9254 INJECTION, LACOSAMIDE: HCPCS | Performed by: NURSE PRACTITIONER

## 2023-11-19 PROCEDURE — 85025 COMPLETE CBC W/AUTO DIFF WBC: CPT | Performed by: INTERNAL MEDICINE

## 2023-11-19 PROCEDURE — 80069 RENAL FUNCTION PANEL: CPT | Performed by: INTERNAL MEDICINE

## 2023-11-19 RX ADMIN — LACOSAMIDE 100 MG: 10 INJECTION INTRAVENOUS at 11:38

## 2023-11-19 RX ADMIN — DEXTROSE MONOHYDRATE, SODIUM CHLORIDE, SODIUM LACTATE, CALCIUM CHLORIDE, MAGNESIUM CHLORIDE 2000 ML: 2.5; 538; 448; 18.4; 5.08 SOLUTION INTRAPERITONEAL at 07:37

## 2023-11-19 RX ADMIN — LACOSAMIDE 100 MG: 10 INJECTION INTRAVENOUS at 00:09

## 2023-11-19 RX ADMIN — PANTOPRAZOLE SODIUM 40 MG: 40 INJECTION, POWDER, FOR SOLUTION INTRAVENOUS at 06:42

## 2023-11-19 RX ADMIN — DEXTROSE MONOHYDRATE, SODIUM CHLORIDE, SODIUM LACTATE, CALCIUM CHLORIDE, MAGNESIUM CHLORIDE 2000 ML: 2.5; 538; 448; 18.4; 5.08 SOLUTION INTRAPERITONEAL at 00:09

## 2023-11-19 RX ADMIN — ESCITALOPRAM OXALATE 10 MG: 10 TABLET, FILM COATED ORAL at 09:52

## 2023-11-19 RX ADMIN — DEXTROSE MONOHYDRATE, SODIUM CHLORIDE, SODIUM LACTATE, CALCIUM CHLORIDE, MAGNESIUM CHLORIDE 2000 ML: 2.5; 538; 448; 18.4; 5.08 SOLUTION INTRAPERITONEAL at 19:31

## 2023-11-19 RX ADMIN — ASPIRIN 81 MG: 81 TABLET, CHEWABLE ORAL at 08:03

## 2023-11-19 RX ADMIN — DEXTROSE MONOHYDRATE, SODIUM CHLORIDE, SODIUM LACTATE, CALCIUM CHLORIDE, MAGNESIUM CHLORIDE 2000 ML: 2.5; 538; 448; 18.4; 5.08 SOLUTION INTRAPERITONEAL at 15:52

## 2023-11-19 RX ADMIN — CARVEDILOL 12.5 MG: 12.5 TABLET, FILM COATED ORAL at 08:03

## 2023-11-19 RX ADMIN — DEXTROSE MONOHYDRATE, SODIUM CHLORIDE, SODIUM LACTATE, CALCIUM CHLORIDE, MAGNESIUM CHLORIDE 2000 ML: 2.5; 538; 448; 18.4; 5.08 SOLUTION INTRAPERITONEAL at 10:52

## 2023-11-19 NOTE — PROGRESS NOTES
[facility] Progress Note      Patient Name: Dee Herrera  : 1990  MRN: [id number]  Primary Care Physician:  Margarita Woods, CAESAR  Date of admission: 10/26/2023    Subjective     Interval History:   F/u ESRD     Review of Systems:   PO intake remains poor overall   No PD issue per RN  Patient not speaking to me this AM    Objective     Vitals:   Temp:  [97.7 °F (36.5 °C)-98.5 °F (36.9 °C)] 98.2 °F (36.8 °C)  Heart Rate:  [78-88] 83  Resp:  [16-18] 16  BP: ()/(66-82) 105/78    Intake/Output Summary (Last 24 hours) at 2023 0913  Last data filed at 2023 0552  Gross per 24 hour   Intake 4180 ml   Output 4500 ml   Net -320 ml       Physical Exam:    General Appearance: frail AAF not speaking, no distress +DHT  Neck: supple, no JVD  Lungs: CTA bilat   Heart: RRR, normal S1 and S2  Abdomen: soft, nontender, +PDC  Extremities: no edema, cyanosis or clubbing       Scheduled Meds:     aspirin, 81 mg, Oral, Daily  carvedilol, 12.5 mg, Oral, Q12H  chlorhexidine, 15 mL, Mouth/Throat, Q12H  Delflex-LC/2.5% Dextrose, 2,000 mL, Intraperitoneal, 5 Exchanges Daily - Dwell Overnight  escitalopram, 10 mg, Oral, Daily  First Mouthwash (Magic Mouthwash), 10 mL, Swish & Spit, Q6H  [Held by provider] heparin (porcine), 5,000 Units, Subcutaneous, Q8H  insulin regular, 2-7 Units, Subcutaneous, Q6H  Lacosamide, 100 mg, Intravenous, Q12H  lidocaine, 10 mL, Subcutaneous, Once  mupirocin, , Each Nare, BID  pantoprazole, 40 mg, Intravenous, Q AM      IV Meds:   hold, 1 each        Results Reviewed:   I have personally reviewed the results from the time of this admission to 2023 09:13 EST     Results from last 7 days   Lab Units 23  0308 23  0330 23  0243 11/15/23  0239 23  0308   SODIUM mmol/L 128* 130* 130*   < > 134*   POTASSIUM mmol/L 3.9 3.1* 3.2*   < > 3.7   CHLORIDE mmol/L 92* 94* 94*   < > 100   CO2 mmol/L 25.1 26.0 24.6   < > 24.0   BUN mg/dL 27*  29* 31*   < > 21*   CREATININE mg/dL 4.50* 4.64* 4.60*   < > 3.31*   CALCIUM mg/dL 8.2* 8.6 8.1*   < > 8.9   BILIRUBIN mg/dL  --  0.2  --   --  0.4   ALK PHOS U/L  --  74  --   --  73   ALT (SGPT) U/L  --  21  --   --  11   AST (SGOT) U/L  --  34*  --   --  28   GLUCOSE mg/dL 107* 158* 189*   < > 124*    < > = values in this interval not displayed.     Estimated Creatinine Clearance: 14.6 mL/min (A) (by C-G formula based on SCr of 4.5 mg/dL (H)).  Results from last 7 days   Lab Units 11/19/23  0308 11/18/23  0330 11/17/23  0243   MAGNESIUM mg/dL 2.0 1.5* 1.9   PHOSPHORUS mg/dL 4.5 1.5* 2.1*         Results from last 7 days   Lab Units 11/19/23  0308 11/18/23  0330 11/17/23  0243 11/16/23  0242 11/15/23  0239   WBC 10*3/mm3 9.43 10.96* 13.12* 11.48* 10.62   HEMOGLOBIN g/dL 9.5* 9.1* 8.5* 8.9* 8.2*   PLATELETS 10*3/mm3 180 172 154 136* 118*           Assessment / Plan     ASSESSMENT:  End-stage renal disease secondary to lupus nephritis who was on peritoneal dialysis, was switched temporarily to hemodialysis postoperatively and now back on PD, with removal of dialysis catheter.  Vol excess improved, de-escalated 4.25 back to 2.5% exchanges due to hypotension.  K, Phos, Mg low with malnutrition - all replaced yesterday and lytes better today.  Mild hyponatremia stable on fluid restriction (PO fluid intake not robust anyhow)  HFpEF - echo 11/6 with hyperdynamic LV, EF > 70%  Vol overload - improved, though CXR 11/16 with vasc italo + effusions, had minimal fluid on attempted right thoracentesis (not performed)  Anemia of CKD, hgb up slightly 9.5; ferritin very high related to inflammatory state   SLE  Ascending aortic aneurysm with dissection, POD16 repair, c/b cardiac tamonade and reexploration of bleeding, POD12   Hypotension (hx HTN) - related in part to more aggressive UF with PD.  BP better, low 100s, with PD adjustment above and cessation of CCB & ARB; coreg has been reduced per cardiology  Failure to thrive -  remains malnourished; alb only 2.1, getting nocturnal TFs and PO intake during the day is poor     PLAN:  Continue PD regimen as is ie 2.5% exchanges  Antihypertensives scaled back as above  D/W Dr Carter, agree with gen surg consultation for PEG  D/W SULMA Martínez MD  11/19/23  09:13 EST    [facility]  [phone]

## 2023-11-19 NOTE — PROGRESS NOTES
" LOS: 24 days   Patient Care Team:  Margarita Woods APRN as PCP - General (Nurse Practitioner)  Winnie Sanchez MD as Referring Physician (Obstetrics and Gynecology)  Norberto Almaraz MD PhD as Consulting Physician (Hematology and Oncology)  Lupis Thomas MD as Consulting Physician (Nephrology)  Hair Mckeon MD as Consulting Physician (Nephrology)  Juana Taylor MD as Consulting Physician (Cardiology)    Chief Complaint: post op    Subjective:  Symptoms:  Stable.  No shortness of breath, cough or chest pain.    Diet:  Poor intake.  No nausea or vomiting.    Activity level: Impaired due to weakness.    Pain:  She reports no pain.      Asking if she can put sweat pants on    Vital Signs  Temp:  [97.7 °F (36.5 °C)-98.5 °F (36.9 °C)] 98.2 °F (36.8 °C)  Heart Rate:  [78-88] 83  Resp:  [16-18] 16  BP: ()/(66-82) 105/78  Body mass index is 18.71 kg/m².    Intake/Output Summary (Last 24 hours) at 11/19/2023 1028  Last data filed at 11/19/2023 0552  Gross per 24 hour   Intake 4180 ml   Output 4500 ml   Net -320 ml     No intake/output data recorded.          11/17/23  0651 11/18/23  0500 11/19/23  0552   Weight: 51.3 kg (113 lb 3.2 oz) 51.8 kg (114 lb 1.6 oz) 50.9 kg (112 lb 4.8 oz)         Objective:  General Appearance:  Comfortable and in no acute distress.    Vital signs: (most recent): Blood pressure 105/78, pulse 83, temperature 98.2 °F (36.8 °C), temperature source Oral, resp. rate 16, height 165 cm (64.96\"), weight 50.9 kg (112 lb 4.8 oz), last menstrual period 08/10/2022, SpO2 100%, not currently breastfeeding.  Vital signs are normal.  No fever.    Output: No urine output and producing stool.    Lungs:  Normal effort and normal respiratory rate.  There are decreased breath sounds.    Heart: Normal rate.  Regular rhythm.  (SR on tele monitor)  Abdomen: Abdomen is soft and non-distended.  Bowel sounds are normal.     Pulses: Distal pulses are intact.    Neurological: Patient is oriented to " "person, place and time.  (drowsy).    Skin:  Warm and dry.  (Sternal dressing clean, dry, and intact)          Results Review:        WBC WBC   Date Value Ref Range Status   11/19/2023 9.43 3.40 - 10.80 10*3/mm3 Final   11/18/2023 10.96 (H) 3.40 - 10.80 10*3/mm3 Final   11/17/2023 13.12 (H) 3.40 - 10.80 10*3/mm3 Final      HGB Hemoglobin   Date Value Ref Range Status   11/19/2023 9.5 (L) 12.0 - 15.9 g/dL Final   11/18/2023 9.1 (L) 12.0 - 15.9 g/dL Final   11/17/2023 8.5 (L) 12.0 - 15.9 g/dL Final      HCT Hematocrit   Date Value Ref Range Status   11/19/2023 28.3 (L) 34.0 - 46.6 % Final   11/18/2023 27.4 (L) 34.0 - 46.6 % Final   11/17/2023 26.8 (L) 34.0 - 46.6 % Final      Platelets Platelets   Date Value Ref Range Status   11/19/2023 180 140 - 450 10*3/mm3 Final   11/18/2023 172 140 - 450 10*3/mm3 Final   11/17/2023 154 140 - 450 10*3/mm3 Final        PT/INR:  No results found for: \"PROTIME\"/No results found for: \"INR\"    Sodium Sodium   Date Value Ref Range Status   11/19/2023 128 (L) 136 - 145 mmol/L Final   11/18/2023 130 (L) 136 - 145 mmol/L Final   11/17/2023 130 (L) 136 - 145 mmol/L Final      Potassium Potassium   Date Value Ref Range Status   11/19/2023 3.9 3.5 - 5.2 mmol/L Final   11/18/2023 3.1 (L) 3.5 - 5.2 mmol/L Final   11/17/2023 3.2 (L) 3.5 - 5.2 mmol/L Final      Chloride Chloride   Date Value Ref Range Status   11/19/2023 92 (L) 98 - 107 mmol/L Final   11/18/2023 94 (L) 98 - 107 mmol/L Final   11/17/2023 94 (L) 98 - 107 mmol/L Final      Bicarbonate CO2   Date Value Ref Range Status   11/19/2023 25.1 22.0 - 29.0 mmol/L Final   11/18/2023 26.0 22.0 - 29.0 mmol/L Final   11/17/2023 24.6 22.0 - 29.0 mmol/L Final      BUN BUN   Date Value Ref Range Status   11/19/2023 27 (H) 6 - 20 mg/dL Final   11/18/2023 29 (H) 6 - 20 mg/dL Final   11/17/2023 31 (H) 6 - 20 mg/dL Final      Creatinine Creatinine   Date Value Ref Range Status   11/19/2023 4.50 (H) 0.57 - 1.00 mg/dL Final   11/18/2023 4.64 (H) 0.57 - " 1.00 mg/dL Final   11/17/2023 4.60 (H) 0.57 - 1.00 mg/dL Final      Calcium Calcium   Date Value Ref Range Status   11/19/2023 8.2 (L) 8.6 - 10.5 mg/dL Final   11/18/2023 8.6 8.6 - 10.5 mg/dL Final   11/17/2023 8.1 (L) 8.6 - 10.5 mg/dL Final      Magnesium Magnesium   Date Value Ref Range Status   11/19/2023 2.0 1.6 - 2.6 mg/dL Final   11/18/2023 1.5 (L) 1.6 - 2.6 mg/dL Final   11/17/2023 1.9 1.6 - 2.6 mg/dL Final          aspirin, 81 mg, Oral, Daily  carvedilol, 12.5 mg, Oral, Q12H  chlorhexidine, 15 mL, Mouth/Throat, Q12H  Delflex-LC/2.5% Dextrose, 2,000 mL, Intraperitoneal, 5 Exchanges Daily - Dwell Overnight  escitalopram, 10 mg, Oral, Daily  First Mouthwash (Magic Mouthwash), 10 mL, Swish & Spit, Q6H  [Held by provider] heparin (porcine), 5,000 Units, Subcutaneous, Q8H  insulin regular, 2-7 Units, Subcutaneous, Q6H  Lacosamide, 100 mg, Intravenous, Q12H  lidocaine, 10 mL, Subcutaneous, Once  mupirocin, , Each Nare, BID  pantoprazole, 40 mg, Intravenous, Q AM      hold, 1 each      Assessment & Plan  - Ascending aortic aneurysm with dissection- s/p ascending aortic dissection repair/proximal replacement, gacron interposition graft, kelli procedure; insertion of right femoral shiley---POD#17; Pagni  - Cardiac tamponade- reexploration for bleeding, removal of large clot compressing the RV---POD#13; Camporrotondo  - severe aortic valve insufficiency  - Encephalopathy, improving   - ESRD on PD  - Lupus--on Cellcept/plaquenil; currently held  - Epilepsy  - chronic immunosuppression  - hypertension  - chronic anemia  - TCP--chronic; improving  - Depression--started on lexapro 11/14  - right MCA CVA--continue ASA per neuro     Remains withdrawn, was able to eat some of her biscuits this morning.  CXR improving  Remains on nocturnal feeds. Encourage increase in oral intake. General surgery consulted to evaluate for PEG placement  Remains in NSR. BP improved after adjustments to meds yesterday  MRI yesterday showed right  MCA CVA. Neuro recommends continuation of baby ASA. Remains on Vimpat for seizures  On PD--managed per nephrology   She is very weak. Continue aggressive PT/OT  CCP working on d/c plans  Continue supportive care    Katherine Caal, CAESAR  11/19/23  10:28 EST

## 2023-11-19 NOTE — NURSING NOTE
Access follow-up due to depression.  Patient has started Lexapro.  No changes per primary RN, affect remains the same.  Chart reveals affect flat, not talking to others. Refused PT, does not open eyes. Upon entering room patient is asleep, will not engage with this clinician. Access will continue to follow.

## 2023-11-19 NOTE — PROGRESS NOTES
"[facility] Follow Up    Chief Complaint: Follow up aortic dissection, aortic valve regurgitation     Interval History: Still not interactive.  Discussed her poor p.o. intake.  She reports that she is waiting for \"biscuits and gravy\" this morning.  She denies any significant pain or shortness of breath.  She reports he just hurts all over.    Objective:     Objective:  Temp:  [97.2 °F (36.2 °C)-98.5 °F (36.9 °C)] 98.1 °F (36.7 °C)  Heart Rate:  [78-90] 84  Resp:  [16-18] 16  BP: ()/(66-82) 106/82     Intake/Output Summary (Last 24 hours) at 11/19/2023 0526  Last data filed at 11/19/2023 0009  Gross per 24 hour   Intake 4420 ml   Output 6650 ml   Net -2230 ml     Body mass index is 19.01 kg/m².      11/16/23  0657 11/17/23  0651 11/18/23  0500   Weight: 50.8 kg (112 lb 1.6 oz) 51.3 kg (113 lb 3.2 oz) 51.8 kg (114 lb 1.6 oz)     Weight change:       Physical Exam:   General : Alert, cooperative, in no acute distress.  Neuro: Alert,cooperative and oriented.  Lungs: CTAB. Normal respiratory effort and rate.  CV: Regular rate and rhythm, normal S1 and S2, no murmurs, gallops or rubs.  ABD: Soft, nontender, nondistended. Positive bowel sounds.  Extr: No edema or cyanosis, moves all extremities.    Lab Review:   Results from last 7 days   Lab Units 11/19/23  0308 11/18/23  0330 11/15/23  0239 11/14/23  0308   SODIUM mmol/L 128* 130*   < > 134*   POTASSIUM mmol/L 3.9 3.1*   < > 3.7   CHLORIDE mmol/L 92* 94*   < > 100   CO2 mmol/L 25.1 26.0   < > 24.0   BUN mg/dL 27* 29*   < > 21*   CREATININE mg/dL 4.50* 4.64*   < > 3.31*   GLUCOSE mg/dL 107* 158*   < > 124*   CALCIUM mg/dL 8.2* 8.6   < > 8.9   AST (SGOT) U/L  --  34*  --  28   ALT (SGPT) U/L  --  21  --  11    < > = values in this interval not displayed.         Results from last 7 days   Lab Units 11/19/23  0308 11/18/23  0330   WBC 10*3/mm3 9.43 10.96*   HEMOGLOBIN g/dL 9.5* 9.1*   HEMATOCRIT % 28.3* 27.4*   PLATELETS 10*3/mm3 180 172       " "  Results from last 7 days   Lab Units 11/19/23  0308 11/18/23  0330   MAGNESIUM mg/dL 2.0 1.5*           Invalid input(s): \"LDLCALC\"      Results from last 7 days   Lab Units 11/13/23  0258   TSH uIU/mL 5.990*     I reviewed the patient's new clinical results.  I personally viewed and interpreted the patient's EKG  Current Medications:   Scheduled Meds:aspirin, 81 mg, Oral, Daily  carvedilol, 12.5 mg, Oral, Q12H  chlorhexidine, 15 mL, Mouth/Throat, Q12H  Delflex-LC/2.5% Dextrose, 2,000 mL, Intraperitoneal, 5 Exchanges Daily - Dwell Overnight  escitalopram, 10 mg, Oral, Daily  First Mouthwash (Magic Mouthwash), 10 mL, Swish & Spit, Q6H  [Held by provider] heparin (porcine), 5,000 Units, Subcutaneous, Q8H  insulin regular, 2-7 Units, Subcutaneous, Q6H  Lacosamide, 100 mg, Intravenous, Q12H  lidocaine, 10 mL, Subcutaneous, Once  mupirocin, , Each Nare, BID  pantoprazole, 40 mg, Intravenous, Q AM      Continuous Infusions:hold, 1 each        Allergies:  Allergies   Allergen Reactions    Minoxidil Hives and Other (See Comments)     Pericardial effusion .       Assessment/Plan:     Ascending aortic aneurysm with subacute/chronic aortic root dissection.status post repair of proximal aortic on 11/2.   Severe aortic regurgitation.  Status post repair on 11/2.   Cardiac tamponade.  secondary to pericardial thrombus anteriorly and compressing right ventricle.  Underwent eexploration with clot removal and treatment of a small amount of bleeding at the suture line on 11/6.  Cardiogenic shock.  due to #3. Resolved  Seizures.  First noted postoperatively.  He is on antiepileptic medications.  Keppra discontinued to see if this will help her mood neurology.  Continued on Vimpat.  ESRD.  secondary to lupus nephritis.  Initially on hemodialysis postoperatively.  Now back on peripheral dialysis.    Hyponatremia.  Trending down this morning.  History of hypertension.  Now blood pressures are low.    Chronic anemia.  stable  Systemic " lupus erythematosus.  Therapy with CellCept and Plaquenil on hold.  Depression.  Access center has seen patient. At this point, she denies any depression. She was started on escitalopram.  Discontinuation of Keppra as above to see if this will help with her mood.  Right MCA stroke.  Noted on MRI.  Neurology believes will recover fully from this.  Failure to thrive.  P.o. intake remains poor.  Plan for general surgery consultation for PEG tube noted.    -Plan for general surgery consult for consideration for PEG tube noted.  - Continue current dose of carvedilol.    Juana Taylor MD  11/19/23  05:26 EST   negative

## 2023-11-19 NOTE — CONSULTS
Cc: Malnutrition, poor oral intake    History of presenting illness:   This is an unfortunate 33-year-old female chronically on peritoneal dialysis admitted to the hospital after undergoing an operation for thoracic ascending aneurysm.  She is quite weak, really not able to eat much and has been receiving feeds via a core track.  It is felt that she is not likely to be able to eat anymore in the near future and we are consulted for consideration of feeding access.  She has been tolerating peritoneal dialysis well.    Past Medical History: End-stage renal disease, lupus, hypertension, migraines, or disorder    Past Surgical History: She underwent aortic aneurysm repair during this admission by cardiothoracic surgery.  Her only abdominal surgery is a laparoscopic placement of a peritoneal dialysis catheter by Dr. Loyola in May 2023.  She had a colonoscopy earlier this year.  Cardiac catheterization done earlier this year as well.    Medications: Reviewed and reconciled in epic, no anticoagulants    Allergies: Minoxidil    Social History: Former smoker and occasional marijuana user, not currently    Family History: Negative for colorectal cancer    Review of Systems:  Patient is extremely fatigued, not able to answer many questions and not able to give any meaningful review of systems at this time    Physical Exam:  BMI: 18.7  General: Extremely weak, chronic ill appearance but no acute distress  Eyes: No scleral icterus, extraocular movements are intact  Neck: Supple without lymphadenopathy or thyromegaly, trachea is in the midline  Respiratory: There is good bilateral chest expansion, no use of accessory muscles is noted  Cardiovascular: No jugular venous distention or peripheral edema is seen  Gastrointestinal: Soft and nondistended.  Peritoneal dialysis catheter in place in the left mid abdomen without sign of infection    Laboratory data: White blood cell count 9.4 hemoglobin 9.5 platelets 180    Imaging data: No  recent relevant data      Assessment and plan:   -Malnutrition and poor oral intake in the setting of a patient with multiple other chronic medical issues  -As a technical matter the most prominent of these is her ongoing peritoneal dialysis  -Likely she would not be a candidate for PEG placement as long as this is the case, or at the very least peritoneal dialysis would need to be held for a period of time around the time of placement of the catheter.  -I discussed with Dr. Patino, who is going to take over tomorrow  -Discussion will have to be undertaken with renal service to see what can be done in this regard, we will follow along      Omega Carrasco MD, FACS  General, Minimally Invasive and Endoscopic Surgery  [facility]    [address], [address]  [place], [place], [address]  P: [phone]  F: [phone]

## 2023-11-19 NOTE — SIGNIFICANT NOTE
11/19/23 1439   OTHER   Discipline physical therapist   Rehab Time/Intention   Session Not Performed other (see comments)  (pt refuse PT this date despite two attempts. will continue to follow)   Therapy Assessment/Plan (PT)   Criteria for Skilled Interventions Met (PT) skilled treatment is necessary   Recommendation   PT - Next Appointment 11/20/23

## 2023-11-19 NOTE — PROGRESS NOTES
" LOS: 24 days     Name: Dee Herrera  Age: 33 y.o.  Sex: female  :  1990  MRN: [id number]         Primary Care Physician: Margarita Woods APRN    Subjective   Subjective  Patient not very interactive.  Does state that she would be interested in speaking with general surgery about PEG tube placement.  Asking for a pair of sweatpants.  Otherwise has no complaints.  Discussed with RN who states that her oral intake remains extremely poor.  Tolerating nocturnal tube feeds.    Objective   Vital Signs  Temp:  [97.7 °F (36.5 °C)-98.5 °F (36.9 °C)] 98.2 °F (36.8 °C)  Heart Rate:  [78-88] 83  Resp:  [16-18] 16  BP: ()/(66-82) 105/78  Body mass index is 18.71 kg/m².    Objective:  General Appearance:  Comfortable, in no acute distress and ill-appearing.    Vital signs: (most recent): Blood pressure 105/78, pulse 83, temperature 98.2 °F (36.8 °C), temperature source Oral, resp. rate 16, height 165 cm (64.96\"), weight 50.9 kg (112 lb 4.8 oz), last menstrual period 08/10/2022, SpO2 100%, not currently breastfeeding.    HEENT: (Cortrak in place)    Lungs:  Normal effort and normal respiratory rate.  There are decreased breath sounds.    Heart: Normal rate.  Regular rhythm.    Abdomen: Abdomen is soft.  Bowel sounds are normal.   There is no abdominal tenderness.     Extremities: There is no dependent edema or local swelling.    Neurological: Patient is alert and oriented to person, place and time.    Skin:  Warm and dry.                Results Review:       I reviewed the patient's new clinical results.    Results from last 7 days   Lab Units 23  0308 23  0330 23  0243 23  0242 11/15/23  0239 23  0308 23  0811   WBC 10*3/mm3 9.43 10.96* 13.12* 11.48* 10.62 9.31 10.66   HEMOGLOBIN g/dL 9.5* 9.1* 8.5* 8.9* 8.2* 7.8* 8.0*   PLATELETS 10*3/mm3 180 172 154 136* 118* 120* 100*     Results from last 7 days   Lab Units 23  0308 23  0330 23  0243 23  0242 " 11/15/23  0239 23  0308 23  0258   SODIUM mmol/L 128* 130* 130* 130* 130* 134* 133*   POTASSIUM mmol/L 3.9 3.1* 3.2* 3.0* 3.3* 3.7 3.5   CHLORIDE mmol/L 92* 94* 94* 96* 98 100 99   CO2 mmol/L 25.1 26.0 24.6 24.9 20.7* 24.0 22.0   BUN mg/dL 27* 29* 31* 28* 26* 21* 32*   CREATININE mg/dL 4.50* 4.64* 4.60* 4.52* 4.03* 3.31* 4.34*   CALCIUM mg/dL 8.2* 8.6 8.1* 8.4* 8.8 8.9 8.5*   GLUCOSE mg/dL 107* 158* 189* 130* 112* 124* 118*                 Scheduled Meds:   aspirin, 81 mg, Oral, Daily  carvedilol, 12.5 mg, Oral, Q12H  chlorhexidine, 15 mL, Mouth/Throat, Q12H  Delflex-LC/2.5% Dextrose, 2,000 mL, Intraperitoneal, 5 Exchanges Daily - Dwell Overnight  escitalopram, 10 mg, Oral, Daily  First Mouthwash (Magic Mouthwash), 10 mL, Swish & Spit, Q6H  insulin regular, 2-7 Units, Subcutaneous, Q6H  Lacosamide, 100 mg, Intravenous, Q12H  lidocaine, 10 mL, Subcutaneous, Once  mupirocin, , Each Nare, BID  pantoprazole, 40 mg, Intravenous, Q AM      PRN Meds:     acetaminophen **OR** acetaminophen **OR** acetaminophen    bisacodyl    bisacodyl    dextrose    dextrose    glucagon (human recombinant)    heparin (porcine)    hold    hydrALAZINE    ipratropium-albuterol    [] Morphine **AND** naloxone    nitroglycerin    ondansetron    polyethylene glycol    Potassium Replacement - Follow Nurse / BPA Driven Protocol  Continuous Infusions:  hold, 1 each        Assessment & Plan   Active Hospital Problems    Diagnosis  POA    **Dissecting ascending aortic aneurysm [I71.010]  Yes    Recent cerebrovascular accident (CVA) [Z86.73]  Yes    Aortic valve lesion [I35.8]  Yes    Severe aortic valve regurgitation [I35.1]  Yes    Hyponatremia [E87.1]  Yes    Poor appetite [R63.0]  Yes    Moderate malnutrition [E44.0]  Yes    Chronic diastolic CHF (congestive heart failure) [I50.32]  Yes    Anemia due to chronic kidney disease, on chronic dialysis [N18.6, D63.1, Z99.2]  Not Applicable    Peritoneal dialysis catheter in place  [Z99.2]  Not Applicable    Essential hypertension [I10]  Yes    End stage renal disease on dialysis [N18.6, Z99.2]  Not Applicable    Seizure disorder [G40.909]  Yes    Elevated liver function tests [R79.89]  Yes    Pericardial effusion [I31.39]  Yes    Systemic lupus erythematosus [M32.9]  Yes    Thrombocytopenia [D69.6]  Yes    Hypertension secondary to other renal disorders [I15.1]  Yes    Pancytopenia [D61.818]  Yes    Vitamin D deficiency [E55.9]  Yes      Resolved Hospital Problems    Diagnosis Date Resolved POA    Abdominal pain [R10.9] 10/28/2023 Yes       Assessment & Plan    -Oral intake and nutrition status remained poor.  Currently on nocturnal tube feeds via cortrak.  Discussed with patient today.  She is agreeable to evaluation from general surgery for PEG tube and will place that consult today.  -Discussed with Dr. Martínez.  Continues on PD.  Changes have been made to her ultrafiltration which affected blood pressure.  Pressures improved after adjustment of antihypertensive regimen yesterday.  -Neurology has provided recommendations regarding her acute CVA.  Currently on low-dose aspirin.  On Vimpat for seizures.  -Keppra discontinued given possible effects on her mood.  Remains on Lexapro.  -CellCept and Plaquenil for her history of SLE is currently on hold.  Cell counts look stable.    Dispo  To be determined    Expected Discharge Date: 11/21/2023; Expected Discharge Time:      Bobby Carter MD  [facility]  11/19/23  10:38 EST

## 2023-11-20 LAB
ALBUMIN SERPL-MCNC: 2.1 G/DL (ref 3.5–5.2)
ANION GAP SERPL CALCULATED.3IONS-SCNC: 14.1 MMOL/L (ref 5–15)
BASOPHILS # BLD AUTO: 0.02 10*3/MM3 (ref 0–0.2)
BASOPHILS NFR BLD AUTO: 0.2 % (ref 0–1.5)
BUN SERPL-MCNC: 32 MG/DL (ref 6–20)
BUN/CREAT SERPL: 6.4 (ref 7–25)
CALCIUM SPEC-SCNC: 8.4 MG/DL (ref 8.6–10.5)
CHLORIDE SERPL-SCNC: 91 MMOL/L (ref 98–107)
CO2 SERPL-SCNC: 24.9 MMOL/L (ref 22–29)
CREAT SERPL-MCNC: 5.01 MG/DL (ref 0.57–1)
DEPRECATED RDW RBC AUTO: 45.1 FL (ref 37–54)
EGFRCR SERPLBLD CKD-EPI 2021: 11.2 ML/MIN/1.73
EOSINOPHIL # BLD AUTO: 0.14 10*3/MM3 (ref 0–0.4)
EOSINOPHIL NFR BLD AUTO: 1.7 % (ref 0.3–6.2)
ERYTHROCYTE [DISTWIDTH] IN BLOOD BY AUTOMATED COUNT: 14.5 % (ref 12.3–15.4)
GLUCOSE BLDC GLUCOMTR-MCNC: 103 MG/DL (ref 70–130)
GLUCOSE BLDC GLUCOMTR-MCNC: 109 MG/DL (ref 70–130)
GLUCOSE BLDC GLUCOMTR-MCNC: 146 MG/DL (ref 70–130)
GLUCOSE BLDC GLUCOMTR-MCNC: 85 MG/DL (ref 70–130)
GLUCOSE BLDC GLUCOMTR-MCNC: 98 MG/DL (ref 70–130)
GLUCOSE SERPL-MCNC: 94 MG/DL (ref 65–99)
HCT VFR BLD AUTO: 28.2 % (ref 34–46.6)
HGB BLD-MCNC: 9.1 G/DL (ref 12–15.9)
IMM GRANULOCYTES # BLD AUTO: 0.05 10*3/MM3 (ref 0–0.05)
IMM GRANULOCYTES NFR BLD AUTO: 0.6 % (ref 0–0.5)
LYMPHOCYTES # BLD AUTO: 0.55 10*3/MM3 (ref 0.7–3.1)
LYMPHOCYTES NFR BLD AUTO: 6.6 % (ref 19.6–45.3)
MAGNESIUM SERPL-MCNC: 1.7 MG/DL (ref 1.6–2.6)
MCH RBC QN AUTO: 27.6 PG (ref 26.6–33)
MCHC RBC AUTO-ENTMCNC: 32.3 G/DL (ref 31.5–35.7)
MCV RBC AUTO: 85.5 FL (ref 79–97)
MONOCYTES # BLD AUTO: 1.01 10*3/MM3 (ref 0.1–0.9)
MONOCYTES NFR BLD AUTO: 12.2 % (ref 5–12)
NEUTROPHILS NFR BLD AUTO: 6.53 10*3/MM3 (ref 1.7–7)
NEUTROPHILS NFR BLD AUTO: 78.7 % (ref 42.7–76)
NRBC BLD AUTO-RTO: 0 /100 WBC (ref 0–0.2)
PHOSPHATE SERPL-MCNC: 4.6 MG/DL (ref 2.5–4.5)
PLATELET # BLD AUTO: 201 10*3/MM3 (ref 140–450)
PMV BLD AUTO: 12.2 FL (ref 6–12)
POTASSIUM SERPL-SCNC: 4 MMOL/L (ref 3.5–5.2)
RBC # BLD AUTO: 3.3 10*6/MM3 (ref 3.77–5.28)
SODIUM SERPL-SCNC: 130 MMOL/L (ref 136–145)
WBC NRBC COR # BLD AUTO: 8.3 10*3/MM3 (ref 3.4–10.8)

## 2023-11-20 PROCEDURE — 25010000002 LACOSAMIDE 200 MG/20ML SOLUTION: Performed by: NURSE PRACTITIONER

## 2023-11-20 PROCEDURE — 85025 COMPLETE CBC W/AUTO DIFF WBC: CPT | Performed by: INTERNAL MEDICINE

## 2023-11-20 PROCEDURE — C9254 INJECTION, LACOSAMIDE: HCPCS | Performed by: NURSE PRACTITIONER

## 2023-11-20 PROCEDURE — 97530 THERAPEUTIC ACTIVITIES: CPT

## 2023-11-20 PROCEDURE — 99232 SBSQ HOSP IP/OBS MODERATE 35: CPT | Performed by: INTERNAL MEDICINE

## 2023-11-20 PROCEDURE — 99231 SBSQ HOSP IP/OBS SF/LOW 25: CPT | Performed by: STUDENT IN AN ORGANIZED HEALTH CARE EDUCATION/TRAINING PROGRAM

## 2023-11-20 PROCEDURE — 83735 ASSAY OF MAGNESIUM: CPT | Performed by: INTERNAL MEDICINE

## 2023-11-20 PROCEDURE — 97535 SELF CARE MNGMENT TRAINING: CPT

## 2023-11-20 PROCEDURE — 99024 POSTOP FOLLOW-UP VISIT: CPT | Performed by: THORACIC SURGERY (CARDIOTHORACIC VASCULAR SURGERY)

## 2023-11-20 PROCEDURE — 82948 REAGENT STRIP/BLOOD GLUCOSE: CPT

## 2023-11-20 PROCEDURE — 80069 RENAL FUNCTION PANEL: CPT | Performed by: INTERNAL MEDICINE

## 2023-11-20 PROCEDURE — 25010000002 ONDANSETRON PER 1 MG

## 2023-11-20 RX ADMIN — ONDANSETRON 4 MG: 2 INJECTION INTRAMUSCULAR; INTRAVENOUS at 19:37

## 2023-11-20 RX ADMIN — CARVEDILOL 12.5 MG: 12.5 TABLET, FILM COATED ORAL at 19:38

## 2023-11-20 RX ADMIN — ACETAMINOPHEN 650 MG: 160 SOLUTION ORAL at 19:37

## 2023-11-20 RX ADMIN — PANTOPRAZOLE SODIUM 40 MG: 40 INJECTION, POWDER, FOR SOLUTION INTRAVENOUS at 06:26

## 2023-11-20 RX ADMIN — DEXTROSE MONOHYDRATE, SODIUM CHLORIDE, SODIUM LACTATE, CALCIUM CHLORIDE, MAGNESIUM CHLORIDE 2000 ML: 2.5; 538; 448; 18.4; 5.08 SOLUTION INTRAPERITONEAL at 06:23

## 2023-11-20 RX ADMIN — LACOSAMIDE 100 MG: 10 INJECTION INTRAVENOUS at 15:57

## 2023-11-20 RX ADMIN — MUPIROCIN 1 APPLICATION: 20 OINTMENT TOPICAL at 08:23

## 2023-11-20 RX ADMIN — DEXTROSE MONOHYDRATE, SODIUM CHLORIDE, SODIUM LACTATE, CALCIUM CHLORIDE, MAGNESIUM CHLORIDE 2000 ML: 2.5; 538; 448; 18.4; 5.08 SOLUTION INTRAPERITONEAL at 16:05

## 2023-11-20 RX ADMIN — DEXTROSE MONOHYDRATE, SODIUM CHLORIDE, SODIUM LACTATE, CALCIUM CHLORIDE, MAGNESIUM CHLORIDE 2000 ML: 2.5; 538; 448; 18.4; 5.08 SOLUTION INTRAPERITONEAL at 10:25

## 2023-11-20 RX ADMIN — LACOSAMIDE 100 MG: 10 INJECTION INTRAVENOUS at 03:48

## 2023-11-20 NOTE — PROGRESS NOTES
"Nutrition Services    Patient Name:  Dee Herrera  YOB: 1990  MRN: [id number]  Admit Date:  10/26/2023    Assessment Date:  11/20/23    Summary: TF follow up  Current TF order: Novasource Renal @ 35 ml/hr (goal 35 ml/hr) w/ 30 ml q 4 hrs free water flushes via cortrak (ND) to run from 8p-8a (12 hours/day). Cortrak tube is now occluded and unable to use for noc TF last night. D/w RN who reports attempts to unclog were unsuccessful. RD associate to try to unclog and/or replace with new Cortrak tube today.      General surgery consulted re: PEG placement, as pt agreeable this as well as transition to HD from PD. Will need TD catheter first. RN unsure of timing.     Oral intake remains poor of Soft to Chew/chopped meats + thin liquids. Varies 0-75%. Staff assisting with food preferences and encouraging PO intake.     Plan/recommend:  Address clogged Cortrak feeding tube - RD associate to attempt to unclog vs replace today.   Continue to monitor PO intake. Continue with noc TF as ordered.    RD to follow closely.    CLINICAL NUTRITION ASSESSMENT      Reason for Assessment Follow-up Protocol, TF Assessment      Diagnosis/Problem SOA, hyponatremia, ESRD on dialysis, abd pain, poor oral intake     Anthropometrics        Current Height  Current Weight  BMI kg/m2 Height: 165 cm (64.96\")  Weight: 54.3 kg (119 lb 9.6 oz) (11/20/23 0417)  Body mass index is 19.93 kg/m².   Adjusted BMI (if applicable)    BMI Category Normal/Healthy (18.4 - 24.9)   Ideal Body Weight (IBW) 125lb   Usual Body Weight (UBW) 120-155   Weight Trend Loss, 5lbs recently, 35lb overall      11/18/23  0500 11/19/23  0552 11/20/23 0417   Weight: 51.8 kg (114 lb 1.6 oz) 50.9 kg (112 lb 4.8 oz) 54.3 kg (119 lb 9.6 oz)       --  Estimated/Assessed Needs        Current Weight  Weight: 55 kg (121 lb 4.1 oz) (11/06/23 1340)       Energy Requirements    Weight for Calculation 52.2 kg   Method for Estimation  30-35 kcal/kg   EST Needs (kcal/day) " [id number]       Protein Requirements    Weight for Calculation 52.2 kg   EST Protein Needs (g/kg) 1.0 gm/kg, 1.5 gm/kg   EST Daily Needs (g/day) 52-78       Fluid Requirements     Method for Estimation 1 mL/kcal    EST Needs (mL/day)      Labs       Pertinent Labs    Results from last 7 days   Lab Units 11/20/23 0317 11/19/23 0308 11/18/23  0330 11/15/23  0239 11/14/23  0308   SODIUM mmol/L 130* 128* 130*   < > 134*   POTASSIUM mmol/L 4.0 3.9 3.1*   < > 3.7   CHLORIDE mmol/L 91* 92* 94*   < > 100   CO2 mmol/L 24.9 25.1 26.0   < > 24.0   BUN mg/dL 32* 27* 29*   < > 21*   CREATININE mg/dL 5.01* 4.50* 4.64*   < > 3.31*   CALCIUM mg/dL 8.4* 8.2* 8.6   < > 8.9   BILIRUBIN mg/dL  --   --  0.2  --  0.4   ALK PHOS U/L  --   --  74  --  73   ALT (SGPT) U/L  --   --  21  --  11   AST (SGOT) U/L  --   --  34*  --  28   GLUCOSE mg/dL 94 107* 158*   < > 124*    < > = values in this interval not displayed.     Results from last 7 days   Lab Units 11/20/23 0317 11/19/23 0308 11/18/23  0330   MAGNESIUM mg/dL 1.7 2.0 1.5*   PHOSPHORUS mg/dL 4.6* 4.5 1.5*   HEMOGLOBIN g/dL 9.1* 9.5* 9.1*   HEMATOCRIT % 28.2* 28.3* 27.4*   WBC 10*3/mm3 8.30 9.43 10.96*   ALBUMIN g/dL 2.1* 2.1* 2.4*     Results from last 7 days   Lab Units 11/20/23 0317 11/19/23  0308 11/18/23  0330 11/17/23  0243 11/16/23  0242   PLATELETS 10*3/mm3 201 180 172 154 136*     COVID19   Date Value Ref Range Status   10/31/2023 Not Detected Not Detected - Ref. Range Final     Lab Results   Component Value Date    HGBA1C 5.10 10/30/2023          Medications           Scheduled Medications aspirin, 81 mg, Oral, Daily  carvedilol, 12.5 mg, Oral, Q12H  chlorhexidine, 15 mL, Mouth/Throat, Q12H  Delflex-LC/2.5% Dextrose, 2,000 mL, Intraperitoneal, 5 Exchanges Daily - Dwell Overnight  escitalopram, 10 mg, Oral, Daily  First Mouthwash (Magic Mouthwash), 10 mL, Swish & Spit, Q6H  insulin regular, 2-7 Units, Subcutaneous, Q6H  Lacosamide, 100 mg, Intravenous, Q12H  lidocaine,  10 mL, Subcutaneous, Once  mupirocin, , Each Nare, BID  pantoprazole, 40 mg, Intravenous, Q AM       Infusions hold, 1 each       PRN Medications   acetaminophen **OR** acetaminophen **OR** acetaminophen    bisacodyl    bisacodyl    dextrose    dextrose    glucagon (human recombinant)    heparin (porcine)    hold    hydrALAZINE    ipratropium-albuterol    [] Morphine **AND** naloxone    nitroglycerin    ondansetron    polyethylene glycol    Potassium Replacement - Follow Nurse / BPA Driven Protocol     Physical Findings          General Findings room air   Oral/Mouth Cavity dry mouth, tooth or teeth missing   Edema  generalized, 1+ (trace)   Gastrointestinal normoactive, last bowel movement:    Skin  surgical incision: sternal, abdomen   Tubes/Drains/Lines Cortrak, dialysis catheter, ND tube, bridle in place @105 cm   NFPE See Malnutrition Severity Assessment -10/26/23   --  Malnutrition Severity Assessment      Patient meets criteria for : Moderate (non-severe) Malnutrition       Current Nutrition Orders & Evaluation of Intake       Oral Nutrition     Food Allergies NKFA   Current PO Diet Diet: Regular/House Diet, Fluid Restriction (240 mL/tray) Diets; 1000 mL/day; Feeding Assistance - Nursing; Texture: Soft to Chew (NDD 3); Soft to Chew: Chopped Meat; Fluid Consistency: Thin (IDDSI 0)   Supplement n/a   PO Evaluation     % PO Intake 0-75%    Factors Affecting Intake: altered respiratory status, decreased appetite   --   Enteral Nutrition     Enteral Route ND    TF Delivery Method Cyclic    Propofol Rate/Kcal     Current TF Order/Rate  Novasource Renal @ 35 mL/hr    TF Goal Rate 35 mL/hr x 12 hours/day (8p-8a)    Current Water Flush 30 mL Q 4 hr    Modular None    TF Residual  n/a - tube is small bowel    TF Tolerance other:did not run last night; had been tolerating    TF Observation Verified TF held at this time     PES STATEMENT / NUTRITION DIAGNOSIS      Nutrition Dx Problem  Problem: Unintentional  Weight Loss, Malnutrition (moderate), and Inadequate Oral Intake  Etiology: Factors Affecting Nutrition - decrease appetite, abd pain    Signs/Symptoms: PO intake, NFPE Results, Unintended Weight Change, and Report/Observation     NUTRITION INTERVENTION / PLAN OF CARE      Intervention Goal(s) Maintain nutrition status, Reduce/improve symptoms, Meet estimated needs, Disease management/therapy, Tolerate PO , Increase intake, Maintain TF/PN, and Maintain weight         RD Intervention/Action Await initiation of EN/PN, Continue to monitor, Care plan reviewed, and Recommend/order: EN   --      Prescription/Orders:       PO Diet       Supplements       Enteral Nutrition    Enteral Prescription:     Enteral Route ND    TF Delivery Method Cyclic    Enteral Product Novasource Renal    Modular None    Propofol Rate/Kcal     TF Start Rate  35 mL/hr     TF Goal Rate  35 mL/hr x 12 hours/day (8p-8a)    Free Water Flush 30 mL Q 4 hr    Provision at Goal:          Calories 840 kcal, meets 54 % needs         Protein  38 gm protein, meets 73 % needs         Fluid (mL) 302 mL free water + 180 mL in flushes    Prescription Ordered Continue same per protocol         Parenteral Nutrition    New Prescription Ordered? Continue same per protocol   --      Monitor/Evaluation Per protocol, PO intake, Pertinent labs, EN delivery/tolerance, Weight, Skin status, GI status, Symptoms, Swallow function   Discharge Plan/Needs Pending clinical course   --    RD to follow per protocol.      Electronically signed by:  Delia Carrasquillo RD  11/20/23 09:32 EST

## 2023-11-20 NOTE — PLAN OF CARE
Goal Outcome Evaluation:  Plan of Care Reviewed With: patient, family        Progress: declining  Outcome Evaluation: NSR on tele, all other vss. Patient refused meds this AM. Attempted to give later in the day but started throwing up. PD 5x daily, tolerating well. Educated patient and family on importance of PO intake to get stronger. Attempted to give boost but threw up. Trying to let her eat whatever sounds appetizing but still hardly eating 20% of meals. Patiet up in chair most of the day, attempted to walk but super weak and unsteady. No real c/o pain. Care ongoing.

## 2023-11-20 NOTE — PROGRESS NOTES
Consult received for hemodialysis catheter, tunneled catheter placement to convert patient from peritoneal dialysis to hemodialysis.  Primary motivation for conversion was to allow gastrostomy tube placement for feeding.  Patient currently refusing PEG tube placement.  Case discussed with nephrology.  If decision to proceed with hemodialysis changes we will be happy to reengage.

## 2023-11-20 NOTE — PROGRESS NOTES
" LOS: 25 days   Patient Care Team:  Margarita Woods APRN as PCP - General (Nurse Practitioner)  Winnie Sanchez MD as Referring Physician (Obstetrics and Gynecology)  Norberto Almaraz MD PhD as Consulting Physician (Hematology and Oncology)  Lupis Thomas MD as Consulting Physician (Nephrology)  Hair Mckeon MD as Consulting Physician (Nephrology)  Juana Taylor MD as Consulting Physician (Cardiology)    Chief Complaint: post op    Subjective:  Symptoms:  Stable.  No shortness of breath, cough or chest pain.    Diet:  Poor intake.  No nausea or vomiting.    Activity level: Impaired due to weakness.    Pain:  She reports no pain.      Eating pancakes    Vital Signs  Temp:  [98 °F (36.7 °C)-99 °F (37.2 °C)] 98.9 °F (37.2 °C)  Heart Rate:  [79-89] 79  Resp:  [14-18] 15  BP: (103-130)/(74-97) 130/93  Body mass index is 19.93 kg/m².    Intake/Output Summary (Last 24 hours) at 11/20/2023 0849  Last data filed at 11/19/2023 2019  Gross per 24 hour   Intake 2380 ml   Output 3100 ml   Net -720 ml     No intake/output data recorded.          11/18/23  0500 11/19/23  0552 11/20/23  0417   Weight: 51.8 kg (114 lb 1.6 oz) 50.9 kg (112 lb 4.8 oz) 54.3 kg (119 lb 9.6 oz)         Objective:  General Appearance:  Comfortable and in no acute distress.    Vital signs: (most recent): Blood pressure 130/93, pulse 79, temperature 98.9 °F (37.2 °C), temperature source Oral, resp. rate 15, height 165 cm (64.96\"), weight 54.3 kg (119 lb 9.6 oz), last menstrual period 08/10/2022, SpO2 100%, not currently breastfeeding.  Vital signs are normal.  No fever.    Output: No urine output and producing stool.    Lungs:  Normal effort and normal respiratory rate.  There are decreased breath sounds.    Heart: Normal rate.  Regular rhythm.  (SR on tele monitor)  Abdomen: Abdomen is soft and non-distended.  Bowel sounds are normal.     Pulses: Distal pulses are intact.    Neurological: Patient is oriented to person, place and time. " " (drowsy).    Skin:  Warm and dry.  (Sternal dressing clean, dry, and intact)          Results Review:        WBC WBC   Date Value Ref Range Status   11/20/2023 8.30 3.40 - 10.80 10*3/mm3 Final   11/19/2023 9.43 3.40 - 10.80 10*3/mm3 Final   11/18/2023 10.96 (H) 3.40 - 10.80 10*3/mm3 Final      HGB Hemoglobin   Date Value Ref Range Status   11/20/2023 9.1 (L) 12.0 - 15.9 g/dL Final   11/19/2023 9.5 (L) 12.0 - 15.9 g/dL Final   11/18/2023 9.1 (L) 12.0 - 15.9 g/dL Final      HCT Hematocrit   Date Value Ref Range Status   11/20/2023 28.2 (L) 34.0 - 46.6 % Final   11/19/2023 28.3 (L) 34.0 - 46.6 % Final   11/18/2023 27.4 (L) 34.0 - 46.6 % Final      Platelets Platelets   Date Value Ref Range Status   11/20/2023 201 140 - 450 10*3/mm3 Final   11/19/2023 180 140 - 450 10*3/mm3 Final   11/18/2023 172 140 - 450 10*3/mm3 Final        PT/INR:  No results found for: \"PROTIME\"/No results found for: \"INR\"    Sodium Sodium   Date Value Ref Range Status   11/20/2023 130 (L) 136 - 145 mmol/L Final   11/19/2023 128 (L) 136 - 145 mmol/L Final   11/18/2023 130 (L) 136 - 145 mmol/L Final      Potassium Potassium   Date Value Ref Range Status   11/20/2023 4.0 3.5 - 5.2 mmol/L Final   11/19/2023 3.9 3.5 - 5.2 mmol/L Final   11/18/2023 3.1 (L) 3.5 - 5.2 mmol/L Final      Chloride Chloride   Date Value Ref Range Status   11/20/2023 91 (L) 98 - 107 mmol/L Final   11/19/2023 92 (L) 98 - 107 mmol/L Final   11/18/2023 94 (L) 98 - 107 mmol/L Final      Bicarbonate CO2   Date Value Ref Range Status   11/20/2023 24.9 22.0 - 29.0 mmol/L Final   11/19/2023 25.1 22.0 - 29.0 mmol/L Final   11/18/2023 26.0 22.0 - 29.0 mmol/L Final      BUN BUN   Date Value Ref Range Status   11/20/2023 32 (H) 6 - 20 mg/dL Final   11/19/2023 27 (H) 6 - 20 mg/dL Final   11/18/2023 29 (H) 6 - 20 mg/dL Final      Creatinine Creatinine   Date Value Ref Range Status   11/20/2023 5.01 (H) 0.57 - 1.00 mg/dL Final   11/19/2023 4.50 (H) 0.57 - 1.00 mg/dL Final   11/18/2023 4.64 " (H) 0.57 - 1.00 mg/dL Final      Calcium Calcium   Date Value Ref Range Status   11/20/2023 8.4 (L) 8.6 - 10.5 mg/dL Final   11/19/2023 8.2 (L) 8.6 - 10.5 mg/dL Final   11/18/2023 8.6 8.6 - 10.5 mg/dL Final      Magnesium Magnesium   Date Value Ref Range Status   11/20/2023 1.7 1.6 - 2.6 mg/dL Final   11/19/2023 2.0 1.6 - 2.6 mg/dL Final   11/18/2023 1.5 (L) 1.6 - 2.6 mg/dL Final          aspirin, 81 mg, Oral, Daily  carvedilol, 12.5 mg, Oral, Q12H  chlorhexidine, 15 mL, Mouth/Throat, Q12H  Delflex-LC/2.5% Dextrose, 2,000 mL, Intraperitoneal, 5 Exchanges Daily - Dwell Overnight  escitalopram, 10 mg, Oral, Daily  First Mouthwash (Magic Mouthwash), 10 mL, Swish & Spit, Q6H  insulin regular, 2-7 Units, Subcutaneous, Q6H  Lacosamide, 100 mg, Intravenous, Q12H  lidocaine, 10 mL, Subcutaneous, Once  mupirocin, , Each Nare, BID  pantoprazole, 40 mg, Intravenous, Q AM      hold, 1 each      Assessment & Plan  - Ascending aortic aneurysm with dissection- s/p ascending aortic dissection repair/proximal replacement, gacron interposition graft, kelli procedure; insertion of right femoral shiley---POD#17; Pagni  - Cardiac tamponade- reexploration for bleeding, removal of large clot compressing the RV---POD#13; Camporrotondo  - severe aortic valve insufficiency  - Encephalopathy, improving   - ESRD on PD  - Lupus--on Cellcept/plaquenil; currently held  - Epilepsy  - chronic immunosuppression  - hypertension  - chronic anemia  - TCP--chronic; improving  - Depression--started on lexapro 11/14  - right MCA CVA--continue ASA per neuro     Remains withdrawn. Is eating pancakes in  bed this morning. This is reportedly her second helping and she is asking for pizza for lunch. I ordered another calorie count just to see how much she is eating--nutrition following. On nocturnal feeds.  Seen by general sx for possible PEG placement. This was discussed with nephrology who have OK'd the plan for PEG. She will have to be transitioned back to  HD. Vascular sx has been consulted for tunnel cath.   CXR improving  Remains in NSR; b/p stable.   MRI with right MCA CVA. Neurology recommends continuation of baby ASA. On Vimpat for seizures.  Remains on PD for now--managed per nephrology   Very weak---continue aggressive PT/OT. Should be up in the chair for all meals.   CCP working on d/c plan  Continue supportive care    Shea Monteiro, CAESAR  11/20/23  08:49 EST

## 2023-11-20 NOTE — PROGRESS NOTES
[facility] Progress Note      Patient Name: Dee Herrera  : 1990  MRN: [id number]  Primary Care Physician:  Margarita Woods, CAESAR  Date of admission: 10/26/2023    Subjective     Interval History:   F/u ESRD     Review of Systems:   Denies dyspnea  No PD issues  Though says little, she is amenable to transition back to IHD if/when undergoes PEG placement   PO intake remains poor  DHT not working and refusing PO meds    Objective     Vitals:   Temp:  [98 °F (36.7 °C)-99 °F (37.2 °C)] 99 °F (37.2 °C)  Heart Rate:  [80-89] 80  Resp:  [14-18] 15  BP: (103-127)/(74-97) 127/97    Intake/Output Summary (Last 24 hours) at 2023 0742  Last data filed at 2023 2019  Gross per 24 hour   Intake 2380 ml   Output 3100 ml   Net -720 ml       Physical Exam:    General Appearance: frail AAF, says little, no distress +DHT  Neck: supple, no JVD  Lungs: CTA bilat   Heart: RRR, normal S1 and S2  Abdomen: soft, nontender, +PDC  Extremities: no edema, cyanosis or clubbing    Scheduled Meds:     aspirin, 81 mg, Oral, Daily  carvedilol, 12.5 mg, Oral, Q12H  chlorhexidine, 15 mL, Mouth/Throat, Q12H  Delflex-LC/2.5% Dextrose, 2,000 mL, Intraperitoneal, 5 Exchanges Daily - Dwell Overnight  escitalopram, 10 mg, Oral, Daily  First Mouthwash (Magic Mouthwash), 10 mL, Swish & Spit, Q6H  insulin regular, 2-7 Units, Subcutaneous, Q6H  Lacosamide, 100 mg, Intravenous, Q12H  lidocaine, 10 mL, Subcutaneous, Once  mupirocin, , Each Nare, BID  pantoprazole, 40 mg, Intravenous, Q AM      IV Meds:   hold, 1 each        Results Reviewed:   I have personally reviewed the results from the time of this admission to 2023 07:42 EST     Results from last 7 days   Lab Units 23  0317 23  0308 23  0330 11/15/23  0239 23  0308   SODIUM mmol/L 130* 128* 130*   < > 134*   POTASSIUM mmol/L 4.0 3.9 3.1*   < > 3.7   CHLORIDE mmol/L 91* 92* 94*   < > 100   CO2 mmol/L 24.9 25.1 26.0   < >  24.0   BUN mg/dL 32* 27* 29*   < > 21*   CREATININE mg/dL 5.01* 4.50* 4.64*   < > 3.31*   CALCIUM mg/dL 8.4* 8.2* 8.6   < > 8.9   BILIRUBIN mg/dL  --   --  0.2  --  0.4   ALK PHOS U/L  --   --  74  --  73   ALT (SGPT) U/L  --   --  21  --  11   AST (SGOT) U/L  --   --  34*  --  28   GLUCOSE mg/dL 94 107* 158*   < > 124*    < > = values in this interval not displayed.     Estimated Creatinine Clearance: 13.9 mL/min (A) (by C-G formula based on SCr of 5.01 mg/dL (H)).  Results from last 7 days   Lab Units 11/20/23  0317 11/19/23  0308 11/18/23  0330   MAGNESIUM mg/dL 1.7 2.0 1.5*   PHOSPHORUS mg/dL 4.6* 4.5 1.5*         Results from last 7 days   Lab Units 11/20/23  0317 11/19/23  0308 11/18/23  0330 11/17/23  0243 11/16/23  0242   WBC 10*3/mm3 8.30 9.43 10.96* 13.12* 11.48*   HEMOGLOBIN g/dL 9.1* 9.5* 9.1* 8.5* 8.9*   PLATELETS 10*3/mm3 201 180 172 154 136*           Assessment / Plan     ASSESSMENT:  End-stage renal disease secondary to lupus nephritis who was on peritoneal dialysis, was switched temporarily to hemodialysis postoperatively and now back on PD, with removal of dialysis catheter.  Vol excess improved, de-escalated 4.25 back to 2.5% exchanges due to hypotension.  K, Phos, Mg low with malnutrition - all replaced over weekend. Mild hyponatremia stable on fluid restriction (PO fluid intake not robust anyhow)  HFpEF - echo 11/6 with hyperdynamic LV, EF > 70%  Vol overload - improved, though CXR 11/16 with vasc italo + effusions, had minimal fluid on attempted right thoracentesis (not performed)  Anemia of CKD, hgb stable 9.1; ferritin very high related to inflammatory state   SLE  Ascending aortic aneurysm with dissection, s/p repair, c/b cardiac tamonade and reexploration of bleeding   HTN - BP was low few days ago and regimen de-escalated, norvasc & ARB stopped, coreg reduced; BP better; now refusing meds (and DHT not working to administer this route)  Failure to thrive - remains malnourished; alb only  2.1, getting nocturnal TFs and PO intake during the day is poor     PLAN:  Continue PD regimen for now, but she needs PEG to boost nutrition.  Would not be able to continue PD in this scenario and would need to transition back to IHD (via tunnel catheter placement).  I feel this course of action is necessary.  I will consult vascular surg to look into TDC placement .  D/W RN    Discussed situation with Dr Diaz who sees her at home clinic.  Concurs with above.  Will also check dsDNA & C3/C4 (assess for lupus cerebritis).  D/w grandmother by phone.  D/w vasc surg also as patient refusing PEG now      Lance Martínez MD  11/20/23  07:42 EST    [facility]  [phone]

## 2023-11-20 NOTE — NURSING NOTE
Access center follow up regarding depression: chart reviewed. Remains weak and lethargic. Flat affect and little engagement at times. Refusing some care at times. Following for support.

## 2023-11-20 NOTE — PLAN OF CARE
The pt declined all bed mobility or OOB participation this AM. She was in agreement to self feeding - she was able to feed herself pancakes using her LUE after prep on her meal tray. She tolerated gentle AAROM in her arms.     Anticipated Discharge Disposition (OT): skilled nursing facility, other (see comments) (Pending progress)

## 2023-11-20 NOTE — PROGRESS NOTES
"General Surgery Progress Note    CC: anorexia    S: Patient seen with family at bedside. She denies NV or abdominal pain. She has been undergoing PD without issue per RN. With regards to oral intake, she says she is a picky eater and does not want to eat, but that she also does not wish to have a feeding tube placed or undergo further surgery.    O:BP (!) 145/106 (BP Location: Right arm, Patient Position: Lying)   Pulse 87   Temp 99 °F (37.2 °C) (Oral)   Resp 15   Ht 165 cm (64.96\")   Wt 54.3 kg (119 lb 9.6 oz)   LMP 08/10/2022 (Approximate)   SpO2 100%   BMI 19.93 kg/m²     Intake & Output (last day)         11/19 0701 11/20 0700 11/20 0701 11/21 0700    P.O. 380 238    Other      Peritoneal Dialysis Volume In 2000 4000    Total Intake(mL/kg) 2380 (43.9) 4238 (78.2)    Urine (mL/kg/hr)      Stool 0     Dialysis 3100 4400    Total Output 3100 4400    Net -720 -162          Stool Unmeasured Occurrence 3 x             GENERAL: awake, alert, comfortable appearing in chair, cooperative  HEENT: EOMI, clear sclera, moist mucus membranes, Dobhoff tube in place  CHEST: normal work of breathing on room air; sternal incision healing without signs of infection  CV: healed scars at right neck and chest where pt notes she had prior tunneled catheter; no AV fistulas in UE  ABDOMEN: soft, distended with peritoneal dialysate instilled, nontender, no rebound or guarding; PD catheter site c/d without infection  EXTREMITIES: CONTRERAS, no cyanosis or edema    SKIN: Warm and dry, no rash    LABS  Results from last 7 days   Lab Units 11/20/23  0317 11/19/23  0308 11/18/23  0330 11/17/23  0243   WBC 10*3/mm3 8.30 9.43 10.96* 13.12*   HEMOGLOBIN g/dL 9.1* 9.5* 9.1* 8.5*   HEMATOCRIT % 28.2* 28.3* 27.4* 26.8*   PLATELETS 10*3/mm3 201 180 172 154   MONOCYTES % %  --   --   --  3.0*     Results from last 7 days   Lab Units 11/20/23  0317 11/19/23  0308 11/18/23  0330 11/15/23  0239 11/14/23  0308   SODIUM mmol/L 130* 128* 130*   < > 134* "   POTASSIUM mmol/L 4.0 3.9 3.1*   < > 3.7   CHLORIDE mmol/L 91* 92* 94*   < > 100   CO2 mmol/L 24.9 25.1 26.0   < > 24.0   BUN mg/dL 32* 27* 29*   < > 21*   CREATININE mg/dL 5.01* 4.50* 4.64*   < > 3.31*   CALCIUM mg/dL 8.4* 8.2* 8.6   < > 8.9   BILIRUBIN mg/dL  --   --  0.2  --  0.4   ALK PHOS U/L  --   --  74  --  73   ALT (SGPT) U/L  --   --  21  --  11   AST (SGOT) U/L  --   --  34*  --  28   GLUCOSE mg/dL 94 107* 158*   < > 124*    < > = values in this interval not displayed.             A/P: 33 y.o. female with multiple medical problems including ESRD on peritoneal dialysis, presenting s/p ascending aortic aneurysm repair with poor oral intake and malnutrition.     Discussed with patient her options for increasing oral intake vs. conversion to hemodialysis so that PEG tube may be placed, as PD is a relative contraindication for PEG due to infection risk. Nephrology agrees that PEG is warranted due to malnutrition and willing to transition patient back to intermittent HD via tunneled catheter placement. The patient is not agreeable to PEG tube placement at this time, stating that she will attempt to increase her oral intake rather than undergo further procedures to address her malnutrition.  Will continue to follow for possible PEG tube placement pending further clinical course.    Yen Patino MD  General, Robotic and Endoscopic Surgery  [facility]    [address] [place] [address], [address]  [place], [place], [address]  P: [phone]  F: [phone]

## 2023-11-20 NOTE — PROGRESS NOTES
[facility] Follow Up    Chief Complaint: Follow up aortic dissection, aortic valve regurgitation      Interval History: Still with poor p.o. intake.  To see gastroenterology and vascular surgery today regarding PEG tube and placement of hemodialysis catheter.  Remains minimally and interactive.    Objective:   aortic dissection, aortic valve regurgitation    Objective:  Temp:  [98 °F (36.7 °C)-99 °F (37.2 °C)] 98.9 °F (37.2 °C)  Heart Rate:  [79-89] 79  Resp:  [14-18] 15  BP: (103-130)/(74-97) 130/93     Intake/Output Summary (Last 24 hours) at 11/20/2023 0923  Last data filed at 11/20/2023 0913  Gross per 24 hour   Intake 2500 ml   Output 3100 ml   Net -600 ml     Body mass index is 19.93 kg/m².      11/18/23  0500 11/19/23  0552 11/20/23  0417   Weight: 51.8 kg (114 lb 1.6 oz) 50.9 kg (112 lb 4.8 oz) 54.3 kg (119 lb 9.6 oz)     Weight change: 3.311 kg (7 lb 4.8 oz)      Physical Exam:   General : Alert, cooperative, in no acute distress.  Neuro: Alert,cooperative and oriented.  Lungs: CTAB. Normal respiratory effort and rate.  CV: Regular rate and rhythm, normal S1 and S2, no murmurs, gallops or rubs.  ABD: Soft, nontender, nondistended. Positive bowel sounds.  Extr: No edema or cyanosis, moves all extremities.    Lab Review:   Results from last 7 days   Lab Units 11/20/23  0317 11/19/23  0308 11/18/23  0330 11/15/23  0239 11/14/23  0308   SODIUM mmol/L 130* 128* 130*   < > 134*   POTASSIUM mmol/L 4.0 3.9 3.1*   < > 3.7   CHLORIDE mmol/L 91* 92* 94*   < > 100   CO2 mmol/L 24.9 25.1 26.0   < > 24.0   BUN mg/dL 32* 27* 29*   < > 21*   CREATININE mg/dL 5.01* 4.50* 4.64*   < > 3.31*   GLUCOSE mg/dL 94 107* 158*   < > 124*   CALCIUM mg/dL 8.4* 8.2* 8.6   < > 8.9   AST (SGOT) U/L  --   --  34*  --  28   ALT (SGPT) U/L  --   --  21  --  11    < > = values in this interval not displayed.         Results from last 7 days   Lab Units 11/20/23  0317 11/19/23  0308   WBC 10*3/mm3 8.30 9.43   HEMOGLOBIN  "g/dL 9.1* 9.5*   HEMATOCRIT % 28.2* 28.3*   PLATELETS 10*3/mm3 201 180         Results from last 7 days   Lab Units 11/20/23  0317 11/19/23  0308   MAGNESIUM mg/dL 1.7 2.0           Invalid input(s): \"LDLCALC\"          I reviewed the patient's new clinical results.  I personally viewed and interpreted the patient's EKG  Current Medications:   Scheduled Meds:aspirin, 81 mg, Oral, Daily  carvedilol, 12.5 mg, Oral, Q12H  chlorhexidine, 15 mL, Mouth/Throat, Q12H  Delflex-LC/2.5% Dextrose, 2,000 mL, Intraperitoneal, 5 Exchanges Daily - Dwell Overnight  escitalopram, 10 mg, Oral, Daily  First Mouthwash (Magic Mouthwash), 10 mL, Swish & Spit, Q6H  insulin regular, 2-7 Units, Subcutaneous, Q6H  Lacosamide, 100 mg, Intravenous, Q12H  lidocaine, 10 mL, Subcutaneous, Once  mupirocin, , Each Nare, BID  pantoprazole, 40 mg, Intravenous, Q AM      Continuous Infusions:hold, 1 each        Allergies:  Allergies   Allergen Reactions    Minoxidil Hives and Other (See Comments)     Pericardial effusion .       Assessment/Plan:     Ascending aortic aneurysm with subacute/chronic aortic root dissection.status post repair of proximal aortic on 11/2.   Severe aortic regurgitation.  Status post repair on 11/2.   Cardiac tamponade.  secondary to pericardial thrombus anteriorly and compressing right ventricle.  Underwent eexploration with clot removal and treatment of a small amount of bleeding at the suture line on 11/6.  Cardiogenic shock.  due to #3. Resolved  Seizures.  First noted postoperatively.  He is on antiepileptic medications.  Keppra discontinued to see if this will help her mood neurology.  Continued on Vimpat.  ESRD.  secondary to lupus nephritis.  Initially on hemodialysis postoperatively.  Now back on peripheral dialysis.  May need to transition back to hemodialysis if PEG tube is placed.  Hyponatremia.  Trending down this morning.  History of hypertension.  Now blood pressures are low.    Chronic anemia.  stable  Systemic " lupus erythematosus.  Therapy with CellCept and Plaquenil on hold.  Depression.  Access center has seen patient. At this point, she denies any depression. She was started on escitalopram.  Discontinuation of Keppra as above to see if this will help with her mood.  Right MCA stroke.  Noted on MRI.  Neurology believes will recover fully from this.  Failure to thrive.  P.o. intake remains poor.  Plan for general surgery consultation for PEG tube noted.    -Continue current cardiac management.  - Await evaluation by general surgery and vascular surgery.    Juana Taylor MD  11/20/23  09:23 EST

## 2023-11-20 NOTE — PLAN OF CARE
Goal Outcome Evaluation:  Plan of Care Reviewed With: (P) patient           Outcome Evaluation: (P) Pt agreeable to PT this AM after lots of encouragement. Performed bed mobility with min assist, STS with min hha x2, and ambulated 5' x2 with min hha x2. Distance limited today d/t pt receiving PD. Pt participated in therapy today by performing LE exercises while sitting EOB. PT will continue to follow and progress activity as pt tolerates.      Anticipated Discharge Disposition (PT): (P) skilled nursing facility, inpatient rehabilitation facility

## 2023-11-20 NOTE — THERAPY TREATMENT NOTE
Patient Name: Dee Herrera  : 1990    MRN: [id number]                              Today's Date: 2023       Admit Date: 10/26/2023    Visit Dx:     ICD-10-CM ICD-9-CM   1. Shortness of breath  R06.02 786.05   2. ESRD (end stage renal disease) on dialysis  N18.6 585.6    Z99.2 V45.11   3. Hyponatremia  E87.1 276.1   4. Generalized abdominal pain  R10.84 789.07   5. Elevated lactic acid level  R79.89 276.2   6. Elevated troponin  R79.89 790.6   7. Severe aortic valve regurgitation  I35.1 424.1   8. Pericardial effusion  I31.39 423.9   9. 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 syndrome    Systemic lupus erythematosus    Lupus nephritis, ISN/RPS class IV    Hypokalemia    Hypocalcemia    COVID-19    Hospital discharge follow-up    Stage 5 chronic kidney disease    Cardiac cirrhosis    Pancreatitis    Duodenitis    Regional enteritis of small bowel    Pericardial effusion    End stage renal disease on dialysis    Hemodialysis status    Seizure disorder    Elevated liver function tests    C. difficile colitis    Anemia, chronic disease    Essential hypertension    Peritoneal dialysis catheter in place    Anemia due to chronic kidney disease, on chronic dialysis    Alternating constipation and diarrhea    Abnormal stress test    Hyponatremia    Poor appetite    Moderate malnutrition    Chronic diastolic CHF (congestive heart failure)    Aortic valve lesion    Severe aortic valve regurgitation    Dissecting ascending aortic aneurysm    Recent cerebrovascular accident (CVA)     Past Medical History:   Diagnosis Date    Anasarca     PER CT SCAN    Dry skin      ESRD (end stage renal disease) on dialysis     MARCO COLE, KATIE CHAVIRA HWDRAEK    History of abdominal pain     History of anemia     History of transfusion     Hypertension     Iron deficiency anemia 09/27/2021    Lupus (systemic lupus erythematosus) 07/30/2022    Migraine     Other specified nutritional anemias     Pericardial effusion     Renal insufficiency     Seizures     STATES LAST WAS 1/2023    Shortness of breath     OCCASIONAL    Vitamin D deficiency 09/27/2021     Past Surgical History:   Procedure Laterality Date    ASCENDING AORTIC ANEURYSM REPAIR W/ MECHANICAL AORTIC VALVE REPLACEMENT N/A 11/2/2023    Procedure: CHETNA STERNOTOMY, AORTIC ROOT REPLACEMENT WITH VALVE SPARING MISHEL PROCEDURE, REPLACEMENT OF ASCENDING AORTA, RIGHT FEMORAL DIALYSIS CATHETER PLACEMENT AND PRP;  Surgeon: Chris Medina MD;  Location: [facility] CVOR;  Service: Cardiothoracic;  Laterality: N/A;    CARDIAC CATHETERIZATION N/A 06/14/2023    Procedure: Coronary angiography;  Surgeon: Juana Taylor MD;  Location: [facility] CATH INVASIVE LOCATION;  Service: Cardiovascular;  Laterality: N/A;    CARDIAC CATHETERIZATION N/A 06/14/2023    Procedure: Left heart cath;  Surgeon: Juana Taylor MD;  Location: [facility] CATH INVASIVE LOCATION;  Service: Cardiovascular;  Laterality: N/A;    CARDIAC CATHETERIZATION N/A 06/14/2023    Procedure: Right Heart Cath;  Surgeon: Juana Taylor MD;  Location: [facility] CATH INVASIVE LOCATION;  Service: Cardiovascular;  Laterality: N/A;    COLONOSCOPY N/A 7/20/2023    Procedure: COLONOSCOPY to cecum with biopsy;  Surgeon: Drew Kaminski MD;  Location: [facility] ENDOSCOPY;  Service: Gastroenterology;  Laterality: N/A;  PRE - diarrhea, constipation  POST - fair prep, normal    CORONARY ARTERY BYPASS GRAFT N/A 11/6/2023    Procedure: STERNAL EXPLORATION AND WASH OUT;  Surgeon: Jr Mitesh Quiroz MD;  Location: [facility] CVOR;  Service: Cardiothoracic;  Laterality: N/A;    ENDOSCOPY N/A 7/20/2023     Procedure: ESOPHAGOGASTRODUODENOSCOPY with biopsy;  Surgeon: Drew Kaminski MD;  Location: [facility] ENDOSCOPY;  Service: Gastroenterology;  Laterality: N/A;  PRE - abn ct abd  POST - gastritis    INSERTION HEMODIALYSIS CATHETER N/A 07/26/2022    Procedure: RIGHT TUNNELED DIALYSIS CATHETER PLACEMENT;  Surgeon: Diandra Adhikari MD;  Location: [facility] MAIN OR;  Service: Vascular;  Laterality: N/A;    INSERTION PERITONEAL DIALYSIS CATHETER N/A 04/03/2023    Procedure: INSERTION PERITONEAL DIALYSIS CATHETER LAPAROSCOPIC, omentumpexy;  Surgeon: Jemal Loyola MD;  Location: [facility] MAIN OR;  Service: General;  Laterality: N/A;    TONSILLECTOMY        General Information       Row Name 11/20/23 0952          OT Time and Intention    Document Type therapy note (daily note)  -RB     Mode of Treatment individual therapy;occupational therapy  -RB       Row Name 11/20/23 0952          General Information    Patient Profile Reviewed yes  -RB       Row Name 11/20/23 0952          Cognition    Orientation Status (Cognition) verbal cues/prompts needed for orientation;other (see comments)  pt did not answer questions for OT  -RB               User Key  (r) = Recorded By, (t) = Taken By, (c) = Cosigned By      Initials Name Provider Type    RB Waleska Duarte OT Occupational Therapist                     Mobility/ADL's       Row Name 11/20/23 0952          Bed Mobility    Comment, (Bed Mobility) declined all bed mobility  -RB       Row Name 11/20/23 0952          Sit-Stand Transfer    Sit-Stand [place] (Transfers) unable to assess  -RB       Row Name 11/20/23 0952          Functional Mobility    Functional Mobility- Ind. Level not tested;unable to perform  -RB       Row Name 11/20/23 0952          Activities of Daily Living    BADL Assessment/Intervention feeding  -RB       Row Name 11/20/23 0952          Self-Feeding Assessment/Training    [place] Level (Feeding) set up;feeding skills  -RB     Comment,  (Feeding) the pt fed herself pancakes this AM using her LUE after set-up and prep on her tray  -RB               User Key  (r) = Recorded By, (t) = Taken By, (c) = Cosigned By      Initials Name Provider Type    Waleska Tello OT Occupational Therapist                   Obj/Interventions       Row Name 11/20/23 0951          Shoulder (Therapeutic Exercise)    Shoulder AAROM (Therapeutic Exercise) bilateral;flexion;extension;10 repetitions;supine  -RB       Row Name 11/20/23 0951          Elbow/Forearm (Therapeutic Exercise)    Elbow/Forearm (Therapeutic Exercise) AAROM (active assistive range of motion)  -RB     Elbow/Forearm AAROM (Therapeutic Exercise) bilateral;extension;10 repetitions;supine  -RB       Row Name 11/20/23 0951          Motor Skills    Therapeutic Exercise shoulder;elbow/forearm  -RB       Row Name 11/20/23 0951          Balance    Comment, Balance Unable to assess  -RB               User Key  (r) = Recorded By, (t) = Taken By, (c) = Cosigned By      Initials Name Provider Type    Waleska Tello OT Occupational Therapist                   Goals/Plan    No documentation.                  Clinical Impression       Row Name 11/20/23 0991          Pain Assessment    Pretreatment Pain Rating 0/10 - no pain  -RB     Posttreatment Pain Rating 0/10 - no pain  -RB       Row Name 11/20/23 0935          Plan of Care Review    Progress no change  -RB     Outcome Evaluation The pt declined all bed mobility or OOB participation this AM. She was in agreement to self feeding - she was able to feed herself pancakes using her LUE after prep on her meal tray. She tolerated gentle AAROM in her arms.     Anticipated Discharge Disposition (OT): skilled nursing facility, other (see comments) (Pending progress)  -RB       Row Name 11/20/23 0962          Therapy Assessment/Plan (OT)    Rehab Potential (OT) fair, will monitor progress closely  -RB     Criteria for Skilled Therapeutic Interventions Met (OT)  yes;skilled treatment is necessary  -RB     Therapy Frequency (OT) 5 times/wk  -RB       Row Name 11/20/23 0950          Therapy Plan Review/Discharge Plan (OT)    Anticipated Discharge Disposition (OT) skilled nursing facility  -RB       Row Name 11/20/23 0950          Vital Signs    O2 Delivery Pre Treatment room air  -RB     O2 Delivery Intra Treatment room air  -RB     O2 Delivery Post Treatment room air  -RB     Pre Patient Position Supine  -RB     Intra Patient Position Supine  agreed to OT raising the HOB to eat  -RB     Post Patient Position Supine  -RB       Row Name 11/20/23 0950          Positioning and Restraints    Post Treatment Position bed  -RB     In Bed notified nsg;supine;call light within reach;exit alarm on;fowlers;encouraged to call for assist;legs elevated  -RB               User Key  (r) = Recorded By, (t) = Taken By, (c) = Cosigned By      Initials Name Provider Type    Waleska Tello OT Occupational Therapist                   Outcome Measures       Row Name 11/20/23 0900 11/20/23 0823       How much help from another person do you currently need...    Turning from your back to your side while in flat bed without using bedrails? 1  -CB 1  -CB    Moving from lying on back to sitting on the side of a flat bed without bedrails? 1  -CB 1  -CB    Moving to and from a bed to a chair (including a wheelchair)? 1  -CB 1  -CB    Standing up from a chair using your arms (e.g., wheelchair, bedside chair)? 1  -CB 1  -CB    Climbing 3-5 steps with a railing? 1  -CB 1  -CB    To walk in hospital room? 1  -CB 1  -CB    AM-PAC 6 Clicks Score (PT) 6  -CB 6  -CB    Highest Level of Mobility Goal 2 --> Bed activities/dependent transfer  -CB 2 --> Bed activities/dependent transfer  -CB              User Key  (r) = Recorded By, (t) = Taken By, (c) = Cosigned By      Initials Name Provider Type    Teresa Chow, RN Registered Nurse                    Occupational Therapy Education       Title: PT OT SLP  Therapies (In Progress)       Topic: Occupational Therapy (Not Started)       Point: ADL training (Not Started)       Description:   Instruct learner(s) on proper safety adaptation and remediation techniques during self care or transfers.   Instruct in proper use of assistive devices.                  Learner Progress:  Not documented in this visit.              Point: Home exercise program (Not Started)       Description:   Instruct learner(s) on appropriate technique for monitoring, assisting and/or progressing therapeutic exercises/activities.                  Learner Progress:  Not documented in this visit.              Point: Precautions (Not Started)       Description:   Instruct learner(s) on prescribed precautions during self-care and functional transfers.                  Learner Progress:  Not documented in this visit.              Point: Body mechanics (Not Started)       Description:   Instruct learner(s) on proper positioning and spine alignment during self-care, functional mobility activities and/or exercises.                  Learner Progress:  Not documented in this visit.                                  OT Recommendation and Plan  Planned Therapy Interventions (OT): activity tolerance training, BADL retraining, functional balance retraining, occupation/activity based interventions, patient/caregiver education/training, transfer/mobility retraining, strengthening exercise, ROM/therapeutic exercise, adaptive equipment training  Therapy Frequency (OT): 5 times/wk  Plan of Care Review  Plan of Care Reviewed With: patient  Progress: no change  Outcome Evaluation: The pt declined all bed mobility or OOB participation this AM. She was in agreement to self feeding - she was able to feed herself pancakes using her LUE after prep on her meal tray. She tolerated gentle AAROM in her arms.     Anticipated Discharge Disposition (OT): skilled nursing facility, other (see comments) (Pending progress)     Time  Calculation:   Evaluation Complexity (OT)  Review Occupational Profile/Medical/Therapy History Complexity: extensive/high complexity  Assessment, Occupational Performance/Identification of Deficit Complexity: 5 or more performance deficits  Clinical Decision Making Complexity (OT): detailed assessment/moderate complexity  Overall Complexity of Evaluation (OT): high complexity     Time Calculation- OT       Row Name 11/20/23 0950             Time Calculation- OT    OT Start Time 0850  -RB      OT Stop Time 0900  -RB      OT Time Calculation (min) 10 min  -RB      Total Timed Code Minutes- OT 10 minute(s)  -RB      OT Received On 11/20/23  -RB      OT - Next Appointment 11/21/23  -RB         Timed Charges    88653 - OT Self Care/Mgmt Minutes 10  -RB         Total Minutes    Timed Charges Total Minutes 10  -RB       Total Minutes 10  -RB                User Key  (r) = Recorded By, (t) = Taken By, (c) = Cosigned By      Initials Name Provider Type    RB Waleska Duarte OT Occupational Therapist                  Therapy Charges for Today       Code Description Service Date Service Provider Modifiers Qty    26430573168 HC OT SELF CARE/MGMT/TRAIN EA 15 MIN 11/20/2023 Waleska Duarte OT GO 1                 Waleska Duarte OT  11/20/2023

## 2023-11-20 NOTE — PROGRESS NOTES
" LOS: 25 days     Name: Dee Herrera  Age: 33 y.o.  Sex: female  :  1990  MRN: [id number]         Primary Care Physician: Margarita Woods APRN    Subjective   Subjective  Oral intake remains poor.  Patient expresses agreement to transition to HD and undergo PEG placement.    Objective   Vital Signs  Temp:  [98 °F (36.7 °C)-99 °F (37.2 °C)] 98.9 °F (37.2 °C)  Heart Rate:  [79-89] 79  Resp:  [14-18] 15  BP: (103-130)/(74-97) 130/93  Body mass index is 19.93 kg/m².    Objective:  General Appearance:  Comfortable, in no acute distress and ill-appearing.    Vital signs: (most recent): Blood pressure 130/93, pulse 79, temperature 98.9 °F (37.2 °C), temperature source Oral, resp. rate 15, height 165 cm (64.96\"), weight 54.3 kg (119 lb 9.6 oz), last menstrual period 08/10/2022, SpO2 100%, not currently breastfeeding.    Lungs:  Normal effort and normal respiratory rate.  She is not in respiratory distress.  There are decreased breath sounds.    Heart: Normal rate.  Regular rhythm.    Abdomen: Abdomen is soft.  Bowel sounds are normal.   There is no abdominal tenderness.     Extremities: There is no dependent edema or local swelling.    Neurological: (She wakes up but says little and remains minimally interactive).    Skin:  Warm and dry.                Results Review:       I reviewed the patient's new clinical results.    Results from last 7 days   Lab Units 23  0317 23  0308 23  0330 23  0243 23  0242 11/15/23  0239 23  0308   WBC 10*3/mm3 8.30 9.43 10.96* 13.12* 11.48* 10.62 9.31   HEMOGLOBIN g/dL 9.1* 9.5* 9.1* 8.5* 8.9* 8.2* 7.8*   PLATELETS 10*3/mm3 201 180 172 154 136* 118* 120*     Results from last 7 days   Lab Units 23  0317 23  0308 23  0330 23  0243 23  0242 11/15/23  0239 23  0308   SODIUM mmol/L 130* 128* 130* 130* 130* 130* 134*   POTASSIUM mmol/L 4.0 3.9 3.1* 3.2* 3.0* 3.3* 3.7   CHLORIDE mmol/L 91* 92* 94* 94* 96* 98 100 "   CO2 mmol/L 24.9 25.1 26.0 24.6 24.9 20.7* 24.0   BUN mg/dL 32* 27* 29* 31* 28* 26* 21*   CREATININE mg/dL 5.01* 4.50* 4.64* 4.60* 4.52* 4.03* 3.31*   CALCIUM mg/dL 8.4* 8.2* 8.6 8.1* 8.4* 8.8 8.9   GLUCOSE mg/dL 94 107* 158* 189* 130* 112* 124*           Scheduled Meds:   aspirin, 81 mg, Oral, Daily  carvedilol, 12.5 mg, Oral, Q12H  chlorhexidine, 15 mL, Mouth/Throat, Q12H  Delflex-LC/2.5% Dextrose, 2,000 mL, Intraperitoneal, 5 Exchanges Daily - Dwell Overnight  escitalopram, 10 mg, Oral, Daily  First Mouthwash (Magic Mouthwash), 10 mL, Swish & Spit, Q6H  insulin regular, 2-7 Units, Subcutaneous, Q6H  Lacosamide, 100 mg, Intravenous, Q12H  lidocaine, 10 mL, Subcutaneous, Once  mupirocin, , Each Nare, BID  pantoprazole, 40 mg, Intravenous, Q AM      PRN Meds:     acetaminophen **OR** acetaminophen **OR** acetaminophen    bisacodyl    bisacodyl    dextrose    dextrose    glucagon (human recombinant)    heparin (porcine)    hold    hydrALAZINE    ipratropium-albuterol    [] Morphine **AND** naloxone    nitroglycerin    ondansetron    polyethylene glycol    Potassium Replacement - Follow Nurse / BPA Driven Protocol  Continuous Infusions:  hold, 1 each        Assessment & Plan   Active Hospital Problems    Diagnosis  POA    **Dissecting ascending aortic aneurysm [I71.010]  Yes    Recent cerebrovascular accident (CVA) [Z86.73]  Yes    Aortic valve lesion [I35.8]  Yes    Severe aortic valve regurgitation [I35.1]  Yes    Hyponatremia [E87.1]  Yes    Poor appetite [R63.0]  Yes    Moderate malnutrition [E44.0]  Yes    Chronic diastolic CHF (congestive heart failure) [I50.32]  Yes    Anemia due to chronic kidney disease, on chronic dialysis [N18.6, D63.1, Z99.2]  Not Applicable    Peritoneal dialysis catheter in place [Z99.2]  Not Applicable    Essential hypertension [I10]  Yes    End stage renal disease on dialysis [N18.6, Z99.2]  Not Applicable    Seizure disorder [G40.909]  Yes    Elevated liver function tests  [R79.89]  Yes    Pericardial effusion [I31.39]  Yes    Systemic lupus erythematosus [M32.9]  Yes    Thrombocytopenia [D69.6]  Yes    Hypertension secondary to other renal disorders [I15.1]  Yes    Pancytopenia [D61.818]  Yes    Vitamin D deficiency [E55.9]  Yes      Resolved Hospital Problems    Diagnosis Date Resolved POA    Abdominal pain [R10.9] 10/28/2023 Yes       Assessment & Plan    -Oral intake and nutrition status remained poor.  General surgery following for PEG tube placement  -Vascular surgery has been consulted for tunneled dialysis catheter anticipation of transition to HD so that PEG tube can be placed.  -Neurology has provided recommendations regarding her acute CVA.  Currently on low-dose aspirin.  On Vimpat for seizures.  -Keppra discontinued given possible effects on her mood.  Remains on Lexapro.  -She appears extremely depressed to me.  I will consult psychiatry.  -CellCept and Plaquenil for her history of SLE is currently on hold.  Cell counts look stable.  -Blood sugars well controlled.  Continue low-dose sliding scale insulin     Dispo  To be determined      Expected Discharge Date: 11/21/2023; Expected Discharge Time:      Bobby Carter MD  [facility]  11/20/23  08:59 EST

## 2023-11-20 NOTE — THERAPY TREATMENT NOTE
Patient Name: Dee Herrera  : 1990    MRN: [id number]                              Today's Date: 2023       Admit Date: 10/26/2023    Visit Dx:     ICD-10-CM ICD-9-CM   1. Shortness of breath  R06.02 786.05   2. ESRD (end stage renal disease) on dialysis  N18.6 585.6    Z99.2 V45.11   3. Hyponatremia  E87.1 276.1   4. Generalized abdominal pain  R10.84 789.07   5. Elevated lactic acid level  R79.89 276.2   6. Elevated troponin  R79.89 790.6   7. Severe aortic valve regurgitation  I35.1 424.1   8. Pericardial effusion  I31.39 423.9   9. 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 syndrome    Systemic lupus erythematosus    Lupus nephritis, ISN/RPS class IV    Hypokalemia    Hypocalcemia    COVID-19    Hospital discharge follow-up    Stage 5 chronic kidney disease    Cardiac cirrhosis    Pancreatitis    Duodenitis    Regional enteritis of small bowel    Pericardial effusion    End stage renal disease on dialysis    Hemodialysis status    Seizure disorder    Elevated liver function tests    C. difficile colitis    Anemia, chronic disease    Essential hypertension    Peritoneal dialysis catheter in place    Anemia due to chronic kidney disease, on chronic dialysis    Alternating constipation and diarrhea    Abnormal stress test    Hyponatremia    Poor appetite    Moderate malnutrition    Chronic diastolic CHF (congestive heart failure)    Aortic valve lesion    Severe aortic valve regurgitation    Dissecting ascending aortic aneurysm    Recent cerebrovascular accident (CVA)     Past Medical History:   Diagnosis Date    Anasarca     PER CT SCAN    Dry skin      ESRD (end stage renal disease) on dialysis     MARCO COLE, KATIE CHAVIRA HWDRAKE    History of abdominal pain     History of anemia     History of transfusion     Hypertension     Iron deficiency anemia 09/27/2021    Lupus (systemic lupus erythematosus) 07/30/2022    Migraine     Other specified nutritional anemias     Pericardial effusion     Renal insufficiency     Seizures     STATES LAST WAS 1/2023    Shortness of breath     OCCASIONAL    Vitamin D deficiency 09/27/2021     Past Surgical History:   Procedure Laterality Date    ASCENDING AORTIC ANEURYSM REPAIR W/ MECHANICAL AORTIC VALVE REPLACEMENT N/A 11/2/2023    Procedure: CHETNA STERNOTOMY, AORTIC ROOT REPLACEMENT WITH VALVE SPARING MISHEL PROCEDURE, REPLACEMENT OF ASCENDING AORTA, RIGHT FEMORAL DIALYSIS CATHETER PLACEMENT AND PRP;  Surgeon: Chris Medina MD;  Location: [facility] CV[facility];  Service: Cardiothoracic;  Laterality: N/A;    CARDIAC CATHETERIZATION N/A 06/14/2023    Procedure: Coronary angiography;  Surgeon: Juana Taylor MD;  Location: [facility] CATH INVASIVE LOCATION;  Service: Cardiovascular;  Laterality: N/A;    CARDIAC CATHETERIZATION N/A 06/14/2023    Procedure: Left heart cath;  Surgeon: Juana Taylor MD;  Location: [facility] CATH INVASIVE LOCATION;  Service: Cardiovascular;  Laterality: N/A;    CARDIAC CATHETERIZATION N/A 06/14/2023    Procedure: Right Heart Cath;  Surgeon: Juana Taylor MD;  Location: [facility] CATH INVASIVE LOCATION;  Service: Cardiovascular;  Laterality: N/A;    COLONOSCOPY N/A 7/20/2023    Procedure: COLONOSCOPY to cecum with biopsy;  Surgeon: Drew Kaminski MD;  Location: [facility] ENDOSCOPY;  Service: Gastroenterology;  Laterality: N/A;  PRE - diarrhea, constipation  POST - fair prep, normal    CORONARY ARTERY BYPASS GRAFT N/A 11/6/2023    Procedure: STERNAL EXPLORATION AND WASH OUT;  Surgeon: Jr Mitesh Quiroz MD;  Location: [facility] CV[facility];  Service: Cardiothoracic;  Laterality: N/A;    ENDOSCOPY N/A 7/20/2023     Procedure: ESOPHAGOGASTRODUODENOSCOPY with biopsy;  Surgeon: Drew Kaminski MD;  Location: [facility] ENDOSCOPY;  Service: Gastroenterology;  Laterality: N/A;  PRE - abn ct abd  POST - gastritis    INSERTION HEMODIALYSIS CATHETER N/A 07/26/2022    Procedure: RIGHT TUNNELED DIALYSIS CATHETER PLACEMENT;  Surgeon: Diandra Adhikari MD;  Location: [facility] MAIN OR;  Service: Vascular;  Laterality: N/A;    INSERTION PERITONEAL DIALYSIS CATHETER N/A 04/03/2023    Procedure: INSERTION PERITONEAL DIALYSIS CATHETER LAPAROSCOPIC, omentumpexy;  Surgeon: Jemal Loyola MD;  Location: [facility] MAIN OR;  Service: General;  Laterality: N/A;    TONSILLECTOMY        General Information       Row Name 11/20/23 [facility]          Physical Therapy Time and Intention    Document Type therapy note (daily note) (P)   -SK     Mode of Treatment individual therapy;physical therapy (P)   -SK       Row Name 11/20/23 [facility]          General Information    Patient Profile Reviewed yes (P)   -SK     Existing Precautions/Restrictions fall;sternal;cardiac (P)   -SK       Row Name 11/20/23 [facility]          Cognition    Orientation Status (Cognition) verbal cues/prompts needed for orientation;other (see comments) (P)   answers yes/no questions  -SK       Row Name 11/20/23 [facility]          Safety Issues, Functional Mobility    Impairments Affecting Function (Mobility) balance;endurance/activity tolerance;strength (P)   -SK     Comment, Safety Issues/Impairments (Mobility) non skid socks (P)   -SK               User Key  (r) = Recorded By, (t) = Taken By, (c) = Cosigned By      Initials Name Provider Type    SK Supriya Harris, PT Student PT Student                   Mobility       Row Name 11/20/23 1232          Bed Mobility    Bed Mobility supine-sit;sit-supine (P)   -SK     Supine-Sit [place] (Bed Mobility) verbal cues;nonverbal cues (demo/gesture);minimum assist (75% patient effort) (P)   -SK     Sit-Supine [place] (Bed Mobility) nonverbal  cues (demo/gesture);verbal cues;minimum assist (75% patient effort) (P)   -SK     Assistive Device (Bed Mobility) head of bed elevated;bed rails (P)   -SK       Row Name 11/20/23 1232          Sit-Stand Transfer    Sit-Stand [place] (Transfers) minimum assist (75% patient effort);verbal cues;nonverbal cues (demo/gesture);2 person assist;contact guard (P)   -SK     Assistive Device (Sit-Stand Transfers) other (see comments) (P)   [facility]  -SK     Comment, (Sit-Stand Transfer) STS x2, min assist for second attempt d/t fatigue (P)   -SK       Row Name 11/20/23 1232          Gait/Stairs (Locomotion)    [place] Level (Gait) verbal cues;nonverbal cues (demo/gesture);minimum assist (75% patient effort);2 person assist (P)   -SK     Assistive Device (Gait) other (see comments) (P)   [facility]  -SK     Distance in Feet (Gait) 5' x2 (P)   -SK     Deviations/Abnormal Patterns (Gait) amrita decreased;gait speed decreased;stride length decreased (P)   -SK     Bilateral Gait Deviations heel strike decreased (P)   -SK     Comment, (Gait/Stairs) pt receiving PD, ambulated 5' f/b from EOB x2 (P)   -SK               User Key  (r) = Recorded By, (t) = Taken By, (c) = Cosigned By      Initials Name Provider Type    SK Supriya Harris, PT Student PT Student                   Obj/Interventions       Row Name 11/20/23 1233          Motor Skills    Therapeutic Exercise -- (P)   5x BLE AP, LAQ  -SK       Row Name 11/20/23 1233          Balance    Balance Assessment standing dynamic balance;standing static balance (P)   -SK     Static Standing Balance verbal cues;minimal assist;2-person assist (P)   -SK     Dynamic Standing Balance verbal cues;minimal assist;2-person assist (P)   -SK     Position/Device Used, Standing Balance other (see comments) (P)   [facility]  -SK               User Key  (r) = Recorded By, (t) = Taken By, (c) = Cosigned By      Initials Name Provider Type    Supriya Correia, PT Student PT Student                    Goals/Plan       Row Name 11/20/23 1317          Problem Specific Goal 1 (PT)    Problem Specific Goal 1 (PT) cardiac level 3 (P)   -SK     Time Frame (Problem Specific Goal 1, PT) 1 week (P)   -SK     Progress/Outcome (Problem Specific Goal 1, PT) goal ongoing (P)   -SK               User Key  (r) = Recorded By, (t) = Taken By, (c) = Cosigned By      Initials Name Provider Type    Supriya Correia, PT Student PT Student                   Clinical Impression       Row Name 11/20/23 1234          Pain    Pre/Posttreatment Pain Comment no c/o pain this date, pt reports she was cold during ambulation (P)   -SK       Row Name 11/20/23 1234          Plan of Care Review    Plan of Care Reviewed With patient (P)   -SK     Outcome Evaluation Pt agreeable to PT this AM after lots of encouragement. Performed bed mobility with min assist, STS with min hha x2, and ambulated 5' x2 with min hha x2. Distance limited today d/t pt receiving PD. Pt participated in therapy today by performing LE exercises while sitting EOB. PT will continue to follow and progress activity as pt tolerates. (P)   -SK       Row Name 11/20/23 1234          Positioning and Restraints    Pre-Treatment Position in bed (P)   -SK     Post Treatment Position bed (P)   -SK     In Bed side lying right;call light within reach;encouraged to call for assist;exit alarm on (P)   -SK               User Key  (r) = Recorded By, (t) = Taken By, (c) = Cosigned By      Initials Name Provider Type    Supriya Correia, PT Student PT Student                   Outcome Measures       Row Name 11/20/23 1237 11/20/23 0900       How much help from another person do you currently need...    Turning from your back to your side while in flat bed without using bedrails? 3 (P)   -SK 1  -CB    Moving from lying on back to sitting on the side of a flat bed without bedrails? 3 (P)   -SK 1  -CB    Moving to and from a bed to a chair (including a wheelchair)? 2 (P)   -SK 1   -CB    Standing up from a chair using your arms (e.g., wheelchair, bedside chair)? 3 (P)   -SK 1  -CB    Climbing 3-5 steps with a railing? 1 (P)   -SK 1  -CB    To walk in hospital room? 3 (P)   -SK 1  -CB    AM-PAC 6 Clicks Score (PT) 15 (P)   -SK 6  -CB    Highest Level of Mobility Goal 4 --> Transfer to chair/commode (P)   -SK 2 --> Bed activities/dependent transfer  -CB      Row Name 11/20/23 0823          How much help from another person do you currently need...    Turning from your back to your side while in flat bed without using bedrails? 1  -CB     Moving from lying on back to sitting on the side of a flat bed without bedrails? 1  -CB     Moving to and from a bed to a chair (including a wheelchair)? 1  -CB     Standing up from a chair using your arms (e.g., wheelchair, bedside chair)? 1  -CB     Climbing 3-5 steps with a railing? 1  -CB     To walk in hospital room? 1  -CB     AM-PAC 6 Clicks Score (PT) 6  -CB     Highest Level of Mobility Goal 2 --> Bed activities/dependent transfer  -CB       Row Name 11/20/23 1237          Functional Assessment    Outcome Measure Options AM-PAC 6 Clicks Basic Mobility (PT) (P)   -SK               User Key  (r) = Recorded By, (t) = Taken By, (c) = Cosigned By      Initials Name Provider Type    CB Teresa Barton, RN Registered Nurse    Supriya Correia, PT Student PT Student                                 Physical Therapy Education       Title: PT OT SLP Therapies (In Progress)       Topic: Physical Therapy (Done)       Point: Mobility training (Done)       Learning Progress Summary             Patient Acceptance, E,TB,D, VU by SK at 11/20/2023 1237    Acceptance, E, NR by  at 11/19/2023 1424    Acceptance, E,TB,D, VU by SK at 11/17/2023 1633    Acceptance, E,D, NR by  at 11/16/2023 1618    Acceptance, E,TB,D, VU by SK at 11/15/2023 1521    Acceptance, E, VU,NR by SK at 11/13/2023 1247    Acceptance, E,TB,D, VU,NR by  at 11/12/2023 1147                          Point: Home exercise program (Done)       Learning Progress Summary             Patient Acceptance, E,TB,D, VU by SK at 11/20/2023 1237    Acceptance, E, NR by  at 11/19/2023 1424    Acceptance, E,TB,D, VU by SK at 11/17/2023 1633    Acceptance, E,D, NR by  at 11/16/2023 1618    Acceptance, E,TB,D, VU by SK at 11/15/2023 1521    Acceptance, E,TB,D, VU,NR by  at 11/12/2023 1148                         Point: Body mechanics (Done)       Learning Progress Summary             Patient Acceptance, E,TB,D, VU by SK at 11/20/2023 1237    Acceptance, E, NR by  at 11/19/2023 1424    Acceptance, E,TB,D, VU by SK at 11/17/2023 1633    Acceptance, E,D, NR by  at 11/16/2023 1618    Acceptance, E,TB,D, VU by SK at 11/15/2023 1521    Acceptance, E, VU,NR by SK at 11/13/2023 1247    Acceptance, E,TB,D, VU,NR by  at 11/12/2023 1148                         Point: Precautions (Done)       Learning Progress Summary             Patient Acceptance, E,TB,D, VU by SK at 11/20/2023 1237    Acceptance, E, NR by  at 11/19/2023 1424    Acceptance, E,TB,D, VU by SK at 11/17/2023 1633    Acceptance, E,D, NR by  at 11/16/2023 1618    Acceptance, E,TB,D, VU by SK at 11/15/2023 1521    Acceptance, E, VU,NR by SK at 11/13/2023 1247    Acceptance, E,TB,D, VU,NR by  at 11/12/2023 1148                                         User Key       Initials Effective Dates Name Provider Type Discipline     06/16/21 -  Kim Almendarez, PT Physical Therapist PT     06/16/21 -  Elba Whaley PT Physical Therapist PT     08/25/23 -  Nae Chaudhary, RN Registered Nurse Nurse    SK 10/10/23 -  Supriya Harris, HYUN Student PT Student PT                  PT Recommendation and Plan     Plan of Care Reviewed With: (P) patient  Outcome Evaluation: (P) Pt agreeable to PT this AM after lots of encouragement. Performed bed mobility with min assist, STS with min hha x2, and ambulated 5' x2 with min hha x2. Distance limited today d/t pt  receiving PD. Pt participated in therapy today by performing LE exercises while sitting EOB. PT will continue to follow and progress activity as pt tolerates.     Time Calculation:         PT Charges       Row Name 11/20/23 1238             Time Calculation    Start Time 0955 (P)   -SK      Stop Time 1006 (P)   -SK      Time Calculation (min) 11 min (P)   -SK      PT Received On 11/20/23 (P)   -SK      PT - Next Appointment 11/21/23 (P)   -SK      PT Goal Re-Cert Due Date 11/27/23 (P)   -SK         Time Calculation- PT    Total Timed Code Minutes- PT 9 minute(s) (P)   -SK         Timed Charges    85382 - PT Therapeutic Activity Minutes 9 (P)   -SK         Total Minutes    Timed Charges Total Minutes 9 (P)   -SK       Total Minutes 9 (P)   -SK                User Key  (r) = Recorded By, (t) = Taken By, (c) = Cosigned By      Initials Name Provider Type    Supriya Correia, PT Student PT Student                  Therapy Charges for Today       Code Description Service Date Service Provider Modifiers Qty    [id number]  PT THERAPEUTIC ACT EA 15 MIN 11/20/2023 Supriya Harris, PT Student GP 1            PT G-Codes  Outcome Measure Options: (P) AM-PAC 6 Clicks Basic Mobility (PT)  AM-PAC 6 Clicks Score (PT): (P) 15  AM-PAC 6 Clicks Score (OT): 6  Modified Linda Scale: 4 - Moderately severe disability.  Unable to walk without assistance, and unable to attend to own bodily needs without assistance.  PT Discharge Summary  Anticipated Discharge Disposition (PT): (P) skilled nursing facility, inpatient rehabilitation facility    Supriya Harris PT Student  11/20/2023

## 2023-11-21 ENCOUNTER — APPOINTMENT (OUTPATIENT)
Dept: GENERAL RADIOLOGY | Facility: HOSPITAL | Age: 33
DRG: 219 | End: 2023-11-21
Payer: MEDICARE

## 2023-11-21 LAB
ALBUMIN SERPL-MCNC: 2.1 G/DL (ref 3.5–5.2)
ANION GAP SERPL CALCULATED.3IONS-SCNC: 12.9 MMOL/L (ref 5–15)
ANISOCYTOSIS BLD QL: ABNORMAL
BASOPHILS # BLD AUTO: 0.02 10*3/MM3 (ref 0–0.2)
BASOPHILS NFR BLD AUTO: 0.3 % (ref 0–1.5)
BUN SERPL-MCNC: 35 MG/DL (ref 6–20)
BUN/CREAT SERPL: 6.5 (ref 7–25)
C3 FRG RBC-MCNC: ABNORMAL
C3 SERPL-MCNC: 63 MG/DL (ref 82–167)
C4 SERPL-MCNC: 23 MG/DL (ref 14–44)
CALCIUM SPEC-SCNC: 8.6 MG/DL (ref 8.6–10.5)
CHLORIDE SERPL-SCNC: 90 MMOL/L (ref 98–107)
CO2 SERPL-SCNC: 25.1 MMOL/L (ref 22–29)
CREAT SERPL-MCNC: 5.37 MG/DL (ref 0.57–1)
DEPRECATED RDW RBC AUTO: 44.4 FL (ref 37–54)
EGFRCR SERPLBLD CKD-EPI 2021: 10.3 ML/MIN/1.73
EOSINOPHIL # BLD AUTO: 0.11 10*3/MM3 (ref 0–0.4)
EOSINOPHIL NFR BLD AUTO: 1.4 % (ref 0.3–6.2)
EOSINOPHIL NFR BLD MANUAL: 3 % (ref 0.3–6.2)
ERYTHROCYTE [DISTWIDTH] IN BLOOD BY AUTOMATED COUNT: 14.3 % (ref 12.3–15.4)
GIANT PLATELETS: ABNORMAL
GLUCOSE BLDC GLUCOMTR-MCNC: 113 MG/DL (ref 70–130)
GLUCOSE BLDC GLUCOMTR-MCNC: 118 MG/DL (ref 70–130)
GLUCOSE BLDC GLUCOMTR-MCNC: 128 MG/DL (ref 70–130)
GLUCOSE BLDC GLUCOMTR-MCNC: 154 MG/DL (ref 70–130)
GLUCOSE SERPL-MCNC: 120 MG/DL (ref 65–99)
HCT VFR BLD AUTO: 30.3 % (ref 34–46.6)
HGB BLD-MCNC: 9.9 G/DL (ref 12–15.9)
IMM GRANULOCYTES # BLD AUTO: 0.07 10*3/MM3 (ref 0–0.05)
IMM GRANULOCYTES NFR BLD AUTO: 0.9 % (ref 0–0.5)
LYMPHOCYTES # BLD AUTO: 0.51 10*3/MM3 (ref 0.7–3.1)
LYMPHOCYTES # BLD MANUAL: ABNORMAL 10*3/UL
LYMPHOCYTES NFR BLD AUTO: 6.4 % (ref 19.6–45.3)
LYMPHOCYTES NFR BLD MANUAL: 8 % (ref 5–12)
MAGNESIUM SERPL-MCNC: 1.5 MG/DL (ref 1.6–2.6)
MCH RBC QN AUTO: 28 PG (ref 26.6–33)
MCHC RBC AUTO-ENTMCNC: 32.7 G/DL (ref 31.5–35.7)
MCV RBC AUTO: 85.6 FL (ref 79–97)
MONOCYTES # BLD AUTO: 0.86 10*3/MM3 (ref 0.1–0.9)
MONOCYTES NFR BLD AUTO: 10.8 % (ref 5–12)
NEUTROPHILS # BLD AUTO: ABNORMAL 10*3/UL
NEUTROPHILS NFR BLD AUTO: 6.38 10*3/MM3 (ref 1.7–7)
NEUTROPHILS NFR BLD AUTO: 80.2 % (ref 42.7–76)
NEUTROPHILS NFR BLD MANUAL: 79 % (ref 42.7–76)
NRBC BLD AUTO-RTO: 0 /100 WBC (ref 0–0.2)
OVALOCYTES BLD QL SMEAR: ABNORMAL
PHOSPHATE SERPL-MCNC: 5 MG/DL (ref 2.5–4.5)
PLATELET # BLD AUTO: 210 10*3/MM3 (ref 140–450)
PMV BLD AUTO: 11.9 FL (ref 6–12)
POTASSIUM SERPL-SCNC: 3.6 MMOL/L (ref 3.5–5.2)
POTASSIUM SERPL-SCNC: 3.7 MMOL/L (ref 3.5–5.2)
RBC # BLD AUTO: 3.54 10*6/MM3 (ref 3.77–5.28)
SODIUM SERPL-SCNC: 128 MMOL/L (ref 136–145)
T4 FREE SERPL-MCNC: 1.6 NG/DL (ref 0.93–1.7)
TROPONIN T SERPL HS-MCNC: 407 NG/L
TROPONIN T SERPL HS-MCNC: 417 NG/L
TSH SERPL DL<=0.05 MIU/L-ACNC: 4.22 UIU/ML (ref 0.27–4.2)
VARIANT LYMPHS NFR BLD MANUAL: 10 % (ref 19.6–45.3)
WBC MORPH BLD: NORMAL
WBC NRBC COR # BLD AUTO: 7.95 10*3/MM3 (ref 3.4–10.8)

## 2023-11-21 PROCEDURE — 85025 COMPLETE CBC W/AUTO DIFF WBC: CPT | Performed by: INTERNAL MEDICINE

## 2023-11-21 PROCEDURE — 93010 ELECTROCARDIOGRAM REPORT: CPT | Performed by: INTERNAL MEDICINE

## 2023-11-21 PROCEDURE — 97110 THERAPEUTIC EXERCISES: CPT

## 2023-11-21 PROCEDURE — 25010000002 LACOSAMIDE 200 MG/20ML SOLUTION: Performed by: NURSE PRACTITIONER

## 2023-11-21 PROCEDURE — 97530 THERAPEUTIC ACTIVITIES: CPT

## 2023-11-21 PROCEDURE — 84443 ASSAY THYROID STIM HORMONE: CPT | Performed by: INTERNAL MEDICINE

## 2023-11-21 PROCEDURE — 25010000002 MAGNESIUM SULFATE 2 GM/50ML SOLUTION: Performed by: INTERNAL MEDICINE

## 2023-11-21 PROCEDURE — 84484 ASSAY OF TROPONIN QUANT: CPT | Performed by: NURSE PRACTITIONER

## 2023-11-21 PROCEDURE — 99232 SBSQ HOSP IP/OBS MODERATE 35: CPT | Performed by: NURSE PRACTITIONER

## 2023-11-21 PROCEDURE — 82948 REAGENT STRIP/BLOOD GLUCOSE: CPT

## 2023-11-21 PROCEDURE — 86160 COMPLEMENT ANTIGEN: CPT | Performed by: INTERNAL MEDICINE

## 2023-11-21 PROCEDURE — 93005 ELECTROCARDIOGRAM TRACING: CPT | Performed by: INTERNAL MEDICINE

## 2023-11-21 PROCEDURE — 84484 ASSAY OF TROPONIN QUANT: CPT | Performed by: INTERNAL MEDICINE

## 2023-11-21 PROCEDURE — 86225 DNA ANTIBODY NATIVE: CPT | Performed by: INTERNAL MEDICINE

## 2023-11-21 PROCEDURE — 85007 BL SMEAR W/DIFF WBC COUNT: CPT | Performed by: INTERNAL MEDICINE

## 2023-11-21 PROCEDURE — 84132 ASSAY OF SERUM POTASSIUM: CPT | Performed by: INTERNAL MEDICINE

## 2023-11-21 PROCEDURE — 74018 RADEX ABDOMEN 1 VIEW: CPT

## 2023-11-21 PROCEDURE — 25010000002 HYDRALAZINE PER 20 MG

## 2023-11-21 PROCEDURE — 84439 ASSAY OF FREE THYROXINE: CPT | Performed by: INTERNAL MEDICINE

## 2023-11-21 PROCEDURE — 80069 RENAL FUNCTION PANEL: CPT | Performed by: INTERNAL MEDICINE

## 2023-11-21 PROCEDURE — C9254 INJECTION, LACOSAMIDE: HCPCS | Performed by: NURSE PRACTITIONER

## 2023-11-21 PROCEDURE — 83735 ASSAY OF MAGNESIUM: CPT | Performed by: INTERNAL MEDICINE

## 2023-11-21 PROCEDURE — 99024 POSTOP FOLLOW-UP VISIT: CPT | Performed by: THORACIC SURGERY (CARDIOTHORACIC VASCULAR SURGERY)

## 2023-11-21 RX ORDER — CARVEDILOL 25 MG/1
25 TABLET ORAL EVERY 12 HOURS SCHEDULED
Status: DISCONTINUED | OUTPATIENT
Start: 2023-11-21 | End: 2023-11-22

## 2023-11-21 RX ORDER — MAGNESIUM SULFATE HEPTAHYDRATE 40 MG/ML
2 INJECTION, SOLUTION INTRAVENOUS ONCE
Status: COMPLETED | OUTPATIENT
Start: 2023-11-21 | End: 2023-11-21

## 2023-11-21 RX ADMIN — LACOSAMIDE 100 MG: 10 INJECTION INTRAVENOUS at 03:19

## 2023-11-21 RX ADMIN — DEXTROSE MONOHYDRATE, SODIUM CHLORIDE, SODIUM LACTATE, CALCIUM CHLORIDE, MAGNESIUM CHLORIDE 2000 ML: 2.5; 538; 448; 18.4; 5.08 SOLUTION INTRAPERITONEAL at 12:10

## 2023-11-21 RX ADMIN — MAGNESIUM SULFATE HEPTAHYDRATE 2 G: 2 INJECTION, SOLUTION INTRAVENOUS at 20:57

## 2023-11-21 RX ADMIN — HYDRALAZINE HYDROCHLORIDE 20 MG: 20 INJECTION INTRAMUSCULAR; INTRAVENOUS at 20:56

## 2023-11-21 RX ADMIN — DEXTROSE MONOHYDRATE, SODIUM CHLORIDE, SODIUM LACTATE, CALCIUM CHLORIDE, MAGNESIUM CHLORIDE 2000 ML: 2.5; 538; 448; 18.4; 5.08 SOLUTION INTRAPERITONEAL at 17:50

## 2023-11-21 RX ADMIN — DEXTROSE MONOHYDRATE, SODIUM CHLORIDE, SODIUM LACTATE, CALCIUM CHLORIDE, MAGNESIUM CHLORIDE 2000 ML: 2.5; 538; 448; 18.4; 5.08 SOLUTION INTRAPERITONEAL at 01:59

## 2023-11-21 RX ADMIN — NITROGLYCERIN 0.4 MG: 0.4 TABLET SUBLINGUAL at 17:14

## 2023-11-21 RX ADMIN — NITROGLYCERIN 0.4 MG: 0.4 TABLET SUBLINGUAL at 16:54

## 2023-11-21 RX ADMIN — DEXTROSE MONOHYDRATE, SODIUM CHLORIDE, SODIUM LACTATE, CALCIUM CHLORIDE, MAGNESIUM CHLORIDE 2000 ML: 2.5; 538; 448; 18.4; 5.08 SOLUTION INTRAPERITONEAL at 07:32

## 2023-11-21 RX ADMIN — ACETAMINOPHEN 650 MG: 325 TABLET, FILM COATED ORAL at 14:51

## 2023-11-21 RX ADMIN — LACOSAMIDE 100 MG: 10 INJECTION INTRAVENOUS at 16:50

## 2023-11-21 RX ADMIN — PANTOPRAZOLE SODIUM 40 MG: 40 INJECTION, POWDER, FOR SOLUTION INTRAVENOUS at 07:07

## 2023-11-21 RX ADMIN — DEXTROSE MONOHYDRATE, SODIUM CHLORIDE, SODIUM LACTATE, CALCIUM CHLORIDE, MAGNESIUM CHLORIDE 2000 ML: 2.5; 538; 448; 18.4; 5.08 SOLUTION INTRAPERITONEAL at 22:53

## 2023-11-21 NOTE — THERAPY TREATMENT NOTE
Patient Name: Dee Herrera  : 1990    MRN: [id number]                              Today's Date: 2023       Admit Date: 10/26/2023    Visit Dx:     ICD-10-CM ICD-9-CM   1. Shortness of breath  R06.02 786.05   2. ESRD (end stage renal disease) on dialysis  N18.6 585.6    Z99.2 V45.11   3. Hyponatremia  E87.1 276.1   4. Generalized abdominal pain  R10.84 789.07   5. Elevated lactic acid level  R79.89 276.2   6. Elevated troponin  R79.89 790.6   7. Severe aortic valve regurgitation  I35.1 424.1   8. Pericardial effusion  I31.39 423.9   9. 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 syndrome    Systemic lupus erythematosus    Lupus nephritis, ISN/RPS class IV    Hypokalemia    Hypocalcemia    COVID-19    Hospital discharge follow-up    Stage 5 chronic kidney disease    Cardiac cirrhosis    Pancreatitis    Duodenitis    Regional enteritis of small bowel    Pericardial effusion    End stage renal disease on dialysis    Hemodialysis status    Seizure disorder    Elevated liver function tests    C. difficile colitis    Anemia, chronic disease    Essential hypertension    Peritoneal dialysis catheter in place    Anemia due to chronic kidney disease, on chronic dialysis    Alternating constipation and diarrhea    Abnormal stress test    Hyponatremia    Poor appetite    Moderate malnutrition    Chronic diastolic CHF (congestive heart failure)    Aortic valve lesion    Severe aortic valve regurgitation    Dissecting ascending aortic aneurysm    Recent cerebrovascular accident (CVA)     Past Medical History:   Diagnosis Date    Anasarca     PER CT SCAN    Dry skin      ESRD (end stage renal disease) on dialysis     MARCO COLE, KATIE CHAVIRA HWDRAKE    History of abdominal pain     History of anemia     History of transfusion     Hypertension     Iron deficiency anemia 09/27/2021    Lupus (systemic lupus erythematosus) 07/30/2022    Migraine     Other specified nutritional anemias     Pericardial effusion     Renal insufficiency     Seizures     STATES LAST WAS 1/2023    Shortness of breath     OCCASIONAL    Vitamin D deficiency 09/27/2021     Past Surgical History:   Procedure Laterality Date    ASCENDING AORTIC ANEURYSM REPAIR W/ MECHANICAL AORTIC VALVE REPLACEMENT N/A 11/2/2023    Procedure: CHETNA STERNOTOMY, AORTIC ROOT REPLACEMENT WITH VALVE SPARING MISHEL PROCEDURE, REPLACEMENT OF ASCENDING AORTA, RIGHT FEMORAL DIALYSIS CATHETER PLACEMENT AND PRP;  Surgeon: Chris Medina MD;  Location: [facility] CVOR;  Service: Cardiothoracic;  Laterality: N/A;    CARDIAC CATHETERIZATION N/A 06/14/2023    Procedure: Coronary angiography;  Surgeon: Juana Taylor MD;  Location: [facility] CATH INVASIVE LOCATION;  Service: Cardiovascular;  Laterality: N/A;    CARDIAC CATHETERIZATION N/A 06/14/2023    Procedure: Left heart cath;  Surgeon: Juana Taylor MD;  Location: [facility] CATH INVASIVE LOCATION;  Service: Cardiovascular;  Laterality: N/A;    CARDIAC CATHETERIZATION N/A 06/14/2023    Procedure: Right Heart Cath;  Surgeon: Juana Taylor MD;  Location: [facility] CATH INVASIVE LOCATION;  Service: Cardiovascular;  Laterality: N/A;    COLONOSCOPY N/A 7/20/2023    Procedure: COLONOSCOPY to cecum with biopsy;  Surgeon: Drew Kaminski MD;  Location: [facility] ENDOSCOPY;  Service: Gastroenterology;  Laterality: N/A;  PRE - diarrhea, constipation  POST - fair prep, normal    CORONARY ARTERY BYPASS GRAFT N/A 11/6/2023    Procedure: STERNAL EXPLORATION AND WASH OUT;  Surgeon: Jr Mitesh Quiroz MD;  Location: [facility] CVOR;  Service: Cardiothoracic;  Laterality: N/A;    ENDOSCOPY N/A 7/20/2023     Procedure: ESOPHAGOGASTRODUODENOSCOPY with biopsy;  Surgeon: Drew Kaminski MD;  Location: [facility] ENDOSCOPY;  Service: Gastroenterology;  Laterality: N/A;  PRE - abn ct abd  POST - gastritis    INSERTION HEMODIALYSIS CATHETER N/A 07/26/2022    Procedure: RIGHT TUNNELED DIALYSIS CATHETER PLACEMENT;  Surgeon: Diandra Adhikari MD;  Location: [facility] MAIN OR;  Service: Vascular;  Laterality: N/A;    INSERTION PERITONEAL DIALYSIS CATHETER N/A 04/03/2023    Procedure: INSERTION PERITONEAL DIALYSIS CATHETER LAPAROSCOPIC, omentumpexy;  Surgeon: Jemal Loyola MD;  Location: [facility] MAIN OR;  Service: General;  Laterality: N/A;    TONSILLECTOMY        General Information       Row Name 11/21/23 1600          Physical Therapy Time and Intention    Document Type therapy note (daily note) (P)   -SK     Mode of Treatment individual therapy;physical therapy (P)   -SK       Row Name 11/21/23 1600          General Information    Patient Profile Reviewed yes (P)   -SK     Existing Precautions/Restrictions fall;sternal;cardiac (P)   -SK       Row Name 11/21/23 1600          Cognition    Orientation Status (Cognition) unable/difficult to assess (P)   answers yes/no questions; spoke short sentences  -SK       Row Name 11/21/23 1600          Safety Issues, Functional Mobility    Safety Issues Affecting Function (Mobility) safety precaution awareness;insight into deficits/self-awareness;sequencing abilities;awareness of need for assistance (P)   -SK     Impairments Affecting Function (Mobility) balance;endurance/activity tolerance;strength (P)   -SK     Comment, Safety Issues/Impairments (Mobility) non skid socks, gait belt (P)   -SK               User Key  (r) = Recorded By, (t) = Taken By, (c) = Cosigned By      Initials Name Provider Type    SK Supriya Harris, PT Student PT Student                   Mobility       Row Name 11/21/23 1600          Bed Mobility    Bed Mobility supine-sit;sit-supine (P)   -SK      Supine-Sit [place] (Bed Mobility) verbal cues;nonverbal cues (demo/gesture);minimum assist (75% patient effort) (P)   -SK     Sit-Supine [place] (Bed Mobility) verbal cues;nonverbal cues (demo/gesture);minimum assist (75% patient effort) (P)   -SK     Assistive Device (Bed Mobility) head of bed elevated;bed rails (P)   -SK       Row Name 11/21/23 1600          Sit-Stand Transfer    Sit-Stand [place] (Transfers) verbal cues;nonverbal cues (demo/gesture);minimum assist (75% patient effort);moderate assist (50% patient effort) (P)   min from bed, mod from toilet  -SK     Assistive Device (Sit-Stand Transfers) other (see comments) (P)   hha  -SK     Comment, (Sit-Stand Transfer) STS x2, from EOB, from toilet (P)   -SK       Row Name 11/21/23 1600          Gait/Stairs (Locomotion)    [place] Level (Gait) verbal cues;nonverbal cues (demo/gesture);minimum assist (75% patient effort) (P)   -SK     Assistive Device (Gait) other (see comments) (P)   hha  -SK     Distance in Feet (Gait) 10, 25 (P)   -SK     Deviations/Abnormal Patterns (Gait) amrita decreased;gait speed decreased;stride length decreased (P)   -SK     Bilateral Gait Deviations heel strike decreased (P)   -SK     Comment, (Gait/Stairs) pt ambulated slower today, slightly more unsteady; few minor LOB that required min hha to correct (P)   -SK               User Key  (r) = Recorded By, (t) = Taken By, (c) = Cosigned By      Initials Name Provider Type    SK Supriya Harris, PT Student PT Student                   Obj/Interventions       Row Name 11/21/23 1604          Motor Skills    Therapeutic Exercise -- (P)   deferred d/t bathroom needs  -SK       Row Name 11/21/23 1604          Balance    Balance Assessment standing dynamic balance;standing static balance (P)   -SK     Static Standing Balance verbal cues;non-verbal cues (demo/gesture);contact guard (P)   -SK     Dynamic Standing Balance verbal cues;non-verbal cues  (demo/gesture);contact guard;minimal assist (P)   -SK     Position/Device Used, Standing Balance other (see comments) (P)   hha  -SK     Comment, Balance slightly more unsteady this date; few minor LOB req min assist to correct during ambulation (P)   -SK               User Key  (r) = Recorded By, (t) = Taken By, (c) = Cosigned By      Initials Name Provider Type    Supriya Correia, PT Student PT Student                   Goals/Plan    No documentation.                  Clinical Impression       Row Name 11/21/23 1604          Pain    Pretreatment Pain Rating 0/10 - no pain (P)   -SK     Posttreatment Pain Rating 0/10 - no pain (P)   -SK     Pre/Posttreatment Pain Comment no c/o pain, pt states she is cold several times throughout session (P)   -SK       Row Name 11/21/23 1604          Plan of Care Review    Plan of Care Reviewed With patient;family (P)   -SK     Outcome Evaluation Pt in bed with family present upon arrival. Agreeable to therapy this PM with encouragement. Pt performed bed mobility with min assist, STS x2 with min/mod assist, and ambulated with min hha. Pt able to perform STS from EOB with min assist, but required mod assist from toilet. Slower and slightly more unsteady this date with few minor LOB observed req min assist to correct. PT will cont to progress activity as pt tolerates. (P)   -SK       Row Name 11/21/23 1604          Positioning and Restraints    Pre-Treatment Position in bed (P)   -SK     Post Treatment Position bed (P)   -SK     In Bed fowlers;call light within reach;encouraged to call for assist;exit alarm on;with family/caregiver (P)   -SK               User Key  (r) = Recorded By, (t) = Taken By, (c) = Cosigned By      Initials Name Provider Type    Supriya Correia, PT Student PT Student                   Outcome Measures       Row Name 11/21/23 1609 11/21/23 0900       How much help from another person do you currently need...    Turning from your back to your  side while in flat bed without using bedrails? 3 (P)   -SK 3  -    Moving from lying on back to sitting on the side of a flat bed without bedrails? 3 (P)   -SK 3  -AH    Moving to and from a bed to a chair (including a wheelchair)? 3 (P)   -SK 2  -    Standing up from a chair using your arms (e.g., wheelchair, bedside chair)? 3 (P)   -SK 3  -    Climbing 3-5 steps with a railing? 2 (P)   -SK 1  -    To walk in hospital room? 3 (P)   -SK 3  -AH    AM-PAC 6 Clicks Score (PT) 17 (P)   -SK 15  -    Highest Level of Mobility Goal 5 --> Static standing (P)   -SK 4 --> Transfer to chair/commode  -      Row Name 11/21/23 1609 11/21/23 1309       Functional Assessment    Outcome Measure Options AM-PAC 6 Clicks Basic Mobility (PT) (P)   -SK AM-PAC 6 Clicks Daily Activity (OT)  -LAURA              User Key  (r) = Recorded By, (t) = Taken By, (c) = Cosigned By      Initials Name Provider Type     Nae Chaudhary, RN Registered Nurse    Isi Baker, OT Occupational Therapist    Supriya Correia, PT Student PT Student                                 Physical Therapy Education       Title: PT OT SLP Therapies (In Progress)       Topic: Physical Therapy (Done)       Point: Mobility training (Done)       Learning Progress Summary             Patient Acceptance, E, VU by SK at 11/21/2023 1609    Acceptance, E,TB,D, VU by SK at 11/20/2023 1237    Acceptance, E, NR by  at 11/19/2023 1424    Acceptance, E,TB,D, VU by SK at 11/17/2023 1633    Acceptance, E,D, NR by  at 11/16/2023 1618    Acceptance, E,TB,D, VU by SK at 11/15/2023 1521    Acceptance, E, VU,NR by SK at 11/13/2023 1247    Acceptance, E,TB,D, VU,NR by  at 11/12/2023 1148   Family Acceptance, E, VU by SK at 11/21/2023 1609                         Point: Home exercise program (Done)       Learning Progress Summary             Patient Acceptance, E, VU by SK at 11/21/2023 1609    Acceptance, E,TB,D, VU by SK at 11/20/2023 1237    Acceptance, E, NR  by  at 11/19/2023 1424    Acceptance, E,TB,D, VU by SK at 11/17/2023 1633    Acceptance, E,D, NR by  at 11/16/2023 1618    Acceptance, E,TB,D, VU by SK at 11/15/2023 1521    Acceptance, E,TB,D, VU,NR by  at 11/12/2023 1148   Family Acceptance, E, VU by SK at 11/21/2023 1609                         Point: Body mechanics (Done)       Learning Progress Summary             Patient Acceptance, E, VU by SK at 11/21/2023 1609    Acceptance, E,TB,D, VU by SK at 11/20/2023 1237    Acceptance, E, NR by  at 11/19/2023 1424    Acceptance, E,TB,D, VU by SK at 11/17/2023 1633    Acceptance, E,D, NR by  at 11/16/2023 1618    Acceptance, E,TB,D, VU by SK at 11/15/2023 1521    Acceptance, E, VU,NR by SK at 11/13/2023 1247    Acceptance, E,TB,D, VU,NR by  at 11/12/2023 1148   Family Acceptance, E, VU by SK at 11/21/2023 1609                         Point: Precautions (Done)       Learning Progress Summary             Patient Acceptance, E, VU by SK at 11/21/2023 1609    Acceptance, E,TB,D, VU by SK at 11/20/2023 1237    Acceptance, E, NR by  at 11/19/2023 1424    Acceptance, E,TB,D, VU by SK at 11/17/2023 1633    Acceptance, E,D, NR by  at 11/16/2023 1618    Acceptance, E,TB,D, VU by SK at 11/15/2023 1521    Acceptance, E, VU,NR by SK at 11/13/2023 1247    Acceptance, E,TB,D, VU,NR by  at 11/12/2023 1148   Family Acceptance, E, VU by SK at 11/21/2023 1609                                         User Key       Initials Effective Dates Name Provider Type Discipline     06/16/21 -  Kim Almendarez, PT Physical Therapist PT     06/16/21 -  Elba Whaley, PT Physical Therapist PT     08/25/23 -  Nae Chaudhary, RN Registered Nurse Nurse    SK 10/10/23 -  Supriya Harris, PT Student PT Student PT                  PT Recommendation and Plan     Plan of Care Reviewed With: (P) patient, family  Outcome Evaluation: (P) Pt in bed with family present upon arrival. Agreeable to therapy this PM with encouragement.  Pt performed bed mobility with min assist, STS x2 with min/mod assist, and ambulated with min hha. Pt able to perform STS from EOB with min assist, but required mod assist from toilet. Slower and slightly more unsteady this date with few minor LOB observed req min assist to correct. PT will cont to progress activity as pt tolerates.     Time Calculation:         PT Charges       Row Name 11/21/23 1610             Time Calculation    Start Time 1351 (P)   -SK      Stop Time 1407 (P)   -SK      Time Calculation (min) 16 min (P)   -SK      PT Received On 11/21/23 (P)   -SK      PT - Next Appointment 11/22/23 (P)   -SK         Time Calculation- PT    Total Timed Code Minutes- PT 14 minute(s) (P)   -SK         Timed Charges    13719 - PT Therapeutic Activity Minutes 14 (P)   -SK         Total Minutes    Timed Charges Total Minutes 14 (P)   -SK       Total Minutes 14 (P)   -SK                User Key  (r) = Recorded By, (t) = Taken By, (c) = Cosigned By      Initials Name Provider Type    Supriya Correia, PT Student PT Student                  Therapy Charges for Today       Code Description Service Date Service Provider Modifiers Qty    [id number] HC PT THERAPEUTIC ACT EA 15 MIN 11/20/2023 Supriya Harris, PT Student GP 1            PT G-Codes  Outcome Measure Options: (P) AM-PAC 6 Clicks Basic Mobility (PT)  AM-PAC 6 Clicks Score (PT): (P) 17  AM-PAC 6 Clicks Score (OT): 8  Modified Linda Scale: 4 - Moderately severe disability.  Unable to walk without assistance, and unable to attend to own bodily needs without assistance.  PT Discharge Summary  Anticipated Discharge Disposition (PT): (P) skilled nursing facility, inpatient rehabilitation facility    Supriya Harris PT Student  11/21/2023

## 2023-11-21 NOTE — CONSULTS
Name: Dee Herrera ADMIT: 10/26/2023   : 1990  PCP: Margarita Woods APRN    MRN: [id number] LOS: 26 days   AGE/SEX: 33 y.o. female  ROOM: [address] [place] [address]      Patient Care Team:  Margarita Woods APRN as PCP - General (Nurse Practitioner)  Winnie Sanchez MD as Referring Physician (Obstetrics and Gynecology)  Norberto Almaraz MD PhD as Consulting Physician (Hematology and Oncology)  Lupis Thomas MD as Consulting Physician (Nephrology)  Hair Mckeon MD as Consulting Physician (Nephrology)  Juana Taylor MD as Consulting Physician (Cardiology)  Chief Complaint   Patient presents with    Shortness of Breath     CC: Possible tunnel catheter placement.    Subjective     Inpatient Vascular Surgery Consult  Consult performed by: Omari Lopez MD  Consult ordered by: Lance Martínez MD  Reason for consult: Possible tunnel catheter.      Inpatient Vascular Surgery Consult  Consult performed by: Omari Lopez MD  Consult ordered by: Delia Stern APRN        History of Present Illness  Recovering after open heart surgery.  Having issues with oral intake.  Questionably will be converting from peritoneal to hemodialysis.    Review of Systems    Past Medical History:   Diagnosis Date    Anasarca     PER CT SCAN    Dry skin     ESRD (end stage renal disease) on dialysis     MARCO COLE, SAT UMAIR FRASER    History of abdominal pain     History of anemia     History of transfusion     Hypertension     Iron deficiency anemia 2021    Lupus (systemic lupus erythematosus) 2022    Migraine     Other specified nutritional anemias     Pericardial effusion     Renal insufficiency     Seizures     STATES LAST WAS 2023    Shortness of breath     OCCASIONAL    Vitamin D deficiency 2021     Past Surgical History:   Procedure Laterality Date    ASCENDING AORTIC ANEURYSM REPAIR W/ MECHANICAL AORTIC VALVE REPLACEMENT N/A 2023    Procedure: CHETNA  STERNOTOMY, AORTIC ROOT REPLACEMENT WITH VALVE SPARING MISHEL PROCEDURE, REPLACEMENT OF ASCENDING AORTA, RIGHT FEMORAL DIALYSIS CATHETER PLACEMENT AND PRP;  Surgeon: Chris Median MD;  Location: [facility];  Service: Cardiothoracic;  Laterality: N/A;    CARDIAC CATHETERIZATION N/A 06/14/2023    Procedure: Coronary angiography;  Surgeon: uJana Taylor MD;  Location: [facility] CATH INVASIVE LOCATION;  Service: Cardiovascular;  Laterality: N/A;    CARDIAC CATHETERIZATION N/A 06/14/2023    Procedure: Left heart cath;  Surgeon: Juana Taylor MD;  Location: [facility] CATH INVASIVE LOCATION;  Service: Cardiovascular;  Laterality: N/A;    CARDIAC CATHETERIZATION N/A 06/14/2023    Procedure: Right Heart Cath;  Surgeon: Juana Taylor MD;  Location: [facility] CATH INVASIVE LOCATION;  Service: Cardiovascular;  Laterality: N/A;    COLONOSCOPY N/A 7/20/2023    Procedure: COLONOSCOPY to cecum with biopsy;  Surgeon: Drew Kaminski MD;  Location: [facility] ENDOSCOPY;  Service: Gastroenterology;  Laterality: N/A;  PRE - diarrhea, constipation  POST - fair prep, normal    CORONARY ARTERY BYPASS GRAFT N/A 11/6/2023    Procedure: STERNAL EXPLORATION AND WASH OUT;  Surgeon: Jr Mitesh Quiroz MD;  Location: [facility];  Service: Cardiothoracic;  Laterality: N/A;    ENDOSCOPY N/A 7/20/2023    Procedure: ESOPHAGOGASTRODUODENOSCOPY with biopsy;  Surgeon: Drew Kaminski MD;  Location: [facility] ENDOSCOPY;  Service: Gastroenterology;  Laterality: N/A;  PRE - abn ct abd  POST - gastritis    INSERTION HEMODIALYSIS CATHETER N/A 07/26/2022    Procedure: RIGHT TUNNELED DIALYSIS CATHETER PLACEMENT;  Surgeon: Diandra Adhikari MD;  Location: [facility] MAIN OR;  Service: Vascular;  Laterality: N/A;    INSERTION PERITONEAL DIALYSIS CATHETER N/A 04/03/2023    Procedure: INSERTION PERITONEAL DIALYSIS CATHETER LAPAROSCOPIC, omentumpexy;  Surgeon: Jemal Loyola MD;  Location: [facility] MAIN OR;  Service: General;  Laterality: N/A;     TONSILLECTOMY       Family History   Problem Relation Age of Onset    Autoimmune disease Mother     Anemia Mother     Diabetes Sister     Anemia Brother     Diabetes Maternal Grandmother     Hypertension Maternal Grandmother     Cancer Maternal Grandmother     Sickle cell anemia Cousin     Kevon Hyperthermia Neg Hx        Social History     Tobacco Use    Smoking status: Former     Types: Cigars     Passive exposure: Past    Smokeless tobacco: Never    Tobacco comments:     Patient smoked black & mild   Vaping Use    Vaping Use: Never used   Substance Use Topics    Alcohol use: Yes     Comment: social    Drug use: Yes     Types: Marijuana     Comment: OCCASIONAL     Medications Prior to Admission   Medication Sig Dispense Refill Last Dose    carvedilol (COREG) 25 MG tablet Take 1 tablet by mouth Every 12 (Twelve) Hours. 60 tablet 0 10/25/2023    famotidine (PEPCID) 20 MG tablet Take 1 tablet by mouth Daily. 30 tablet 0 10/25/2023    hydroxychloroquine (PLAQUENIL) 200 MG tablet Take 1 tablet by mouth Daily.   10/25/2023    mycophenolate (CELLCEPT) 500 MG tablet Take 0.5 tablets by mouth Every 12 (Twelve) Hours. 30 tablet 0 10/25/2023    ondansetron (Zofran) 4 MG tablet Take 1 tablet by mouth Every 8 (Eight) Hours As Needed for Nausea or Vomiting. 30 tablet 1 10/25/2023    predniSONE (DELTASONE) 10 MG tablet Take 1 tablet by mouth Daily. 30 tablet 0 10/25/2023    sevelamer (RENVELA) 800 MG tablet Take 1 tablet by mouth 3 (Three) Times a Day With Meals.   10/25/2023    NIFEdipine XL (PROCARDIA XL) 90 MG 24 hr tablet Take 1 tablet by mouth Daily. (Patient not taking: Reported on 10/26/2023) 30 tablet 0 Not Taking    polyethylene glycol (MIRALAX) 17 GM/SCOOP powder Take 17 g by mouth As Needed. (Patient not taking: Reported on 10/26/2023)   Not Taking    potassium chloride 10 MEQ CR tablet Take 1 tablet by mouth Daily.   More than a month    sennosides-docusate (PERICOLACE) 8.6-50 MG per tablet Take 2 tablets by mouth  Daily As Needed for Constipation. (Patient not taking: Reported on 10/26/2023) 30 tablet 1 Not Taking    simethicone (MYLICON) 80 MG chewable tablet Chew 1 tablet Every 6 (Six) Hours As Needed. (Patient not taking: Reported on 10/26/2023)   Not Taking     aspirin, 81 mg, Oral, Daily  carvedilol, 25 mg, Oral, Q12H  chlorhexidine, 15 mL, Mouth/Throat, Q12H  Delflex-LC/2.5% Dextrose, 2,000 mL, Intraperitoneal, 5 Exchanges Daily - Dwell Overnight  escitalopram, 10 mg, Oral, Daily  First Mouthwash (Magic Mouthwash), 10 mL, Swish & Spit, Q6H  insulin regular, 2-7 Units, Subcutaneous, Q6H  Lacosamide, 100 mg, Intravenous, Q12H  lidocaine, 10 mL, Subcutaneous, Once  mupirocin, , Each Nare, BID  pantoprazole, 40 mg, Intravenous, Q AM      hold, 1 each        acetaminophen **OR** acetaminophen **OR** acetaminophen    bisacodyl    bisacodyl    dextrose    dextrose    glucagon (human recombinant)    heparin (porcine)    hold    hydrALAZINE    ipratropium-albuterol    [] Morphine **AND** naloxone    nitroglycerin    ondansetron    polyethylene glycol  Minoxidil    Objective     Physical Exam:  Physical Exam  Constitutional:       Appearance: She is well-developed.   Pulmonary:      Effort: Pulmonary effort is normal. No respiratory distress.   Abdominal:      General: There is no distension.      Palpations: Abdomen is soft.   Neurological:      Mental Status: She is alert and oriented to person, place, and time.          Vital Signs and Labs:  Vital Signs Patient Vitals for the past 24 hrs:   BP Temp Temp src Pulse Resp SpO2 Weight   23 0802 (!) 139/108 99.3 °F (37.4 °C) Oral 81 14 96 % --   23 0500 -- -- -- -- -- -- 52.6 kg (115 lb 14.4 oz)   23 0320 (!) 135/102 -- -- 82 14 99 % --   23 0037 (!) 128/104 98.9 °F (37.2 °C) Oral 78 -- 98 % --   23 1900 (!) 143/107 98.5 °F (36.9 °C) Oral -- 16 -- --   23 1546 (!) 136/101 99.1 °F (37.3 °C) Oral 86 16 -- --   23 1232 (!) 145/106 99 °F  (37.2 °C) Oral 87 15 100 % --     I/O:  I/O last 3 completed shifts:  In: 9248 [P.O.:908; Other:60; NG/GT:280]  Out: 99940 [Emesis/NG output:500]    CBC    Results from last 7 days   Lab Units 11/21/23  0713 11/20/23 0317 11/19/23  0308 11/18/23  0330 11/17/23  0243 11/16/23  0242 11/15/23  0239   WBC 10*3/mm3 7.95 8.30 9.43 10.96* 13.12* 11.48* 10.62   HEMOGLOBIN g/dL 9.9* 9.1* 9.5* 9.1* 8.5* 8.9* 8.2*   PLATELETS 10*3/mm3 210 201 180 172 154 136* 118*     BMP   Results from last 7 days   Lab Units 11/21/23  0401 11/20/23  0317 11/19/23  0308 11/18/23  0330 11/17/23  0243 11/16/23  0242 11/15/23  0239   SODIUM mmol/L 128* 130* 128* 130* 130* 130* 130*   POTASSIUM mmol/L 3.7 4.0 3.9 3.1* 3.2* 3.0* 3.3*   CHLORIDE mmol/L 90* 91* 92* 94* 94* 96* 98   CO2 mmol/L 25.1 24.9 25.1 26.0 24.6 24.9 20.7*   BUN mg/dL 35* 32* 27* 29* 31* 28* 26*   CREATININE mg/dL 5.37* 5.01* 4.50* 4.64* 4.60* 4.52* 4.03*   GLUCOSE mg/dL 120* 94 107* 158* 189* 130* 112*   MAGNESIUM mg/dL  --  1.7 2.0 1.5* 1.9 1.5* 1.7   PHOSPHORUS mg/dL 5.0* 4.6* 4.5 1.5* 2.1* 2.9 2.7       Active Hospital Problems    Diagnosis  POA    **Dissecting ascending aortic aneurysm [I71.010]  Yes    Recent cerebrovascular accident (CVA) [Z86.73]  Yes    Aortic valve lesion [I35.8]  Yes    Severe aortic valve regurgitation [I35.1]  Yes    Hyponatremia [E87.1]  Yes    Poor appetite [R63.0]  Yes    Moderate malnutrition [E44.0]  Yes    Chronic diastolic CHF (congestive heart failure) [I50.32]  Yes    Anemia due to chronic kidney disease, on chronic dialysis [N18.6, D63.1, Z99.2]  Not Applicable    Peritoneal dialysis catheter in place [Z99.2]  Not Applicable    Essential hypertension [I10]  Yes    End stage renal disease on dialysis [N18.6, Z99.2]  Not Applicable    Seizure disorder [G40.909]  Yes    Elevated liver function tests [R79.89]  Yes    Pericardial effusion [I31.39]  Yes    Systemic lupus erythematosus [M32.9]  Yes    Thrombocytopenia [D69.6]  Yes     Hypertension secondary to other renal disorders [I15.1]  Yes    Pancytopenia [D61.818]  Yes    Vitamin D deficiency [E55.9]  Yes      Resolved Hospital Problems    Diagnosis Date Resolved POA    Abdominal pain [R10.9] 10/28/2023 Yes     Problem Points:  3:  Patient has a new problem, with no additional work-up planned (max of 1)  Total problem points:3    Data Points:  1:  I have reviewed or order clinical lab test  Total data points:1    Risk Points:  High:  Any illness that poses threat to life or body funciton    MDM requires 2/3 (Problem points, Data points and Risk)  MDM Prob point Data point Risk   SF 1 1 Minimal   Low 2 2 Low   Mod 3 3 Moderate   High 4 4 High     Code requires 3/3 (MDM, History and Exam)  Code MDM History Exam Time   26948 SF/Low Detailed Detailed 30   60403 Mod Comprehensive Comprehensive 50   37600 High Comprehensive Comprehensive 70     Detailed history:  4 elements HPI or status of 3 chronic problems; 2-9 system ROS  Comprehensive:  4 elements HPI or status of 3 chronic problems;  10 system ROS    Detailed Exam:  12 findings from any organ system  Comprehensive Exam:  2 findings from each of 9 systems.   86294    Assessment & Plan       Dissecting ascending aortic aneurysm    Vitamin D deficiency    Thrombocytopenia    Hypertension secondary to other renal disorders    Pancytopenia    Systemic lupus erythematosus    Pericardial effusion    End stage renal disease on dialysis    Seizure disorder    Elevated liver function tests    Essential hypertension    Peritoneal dialysis catheter in place    Anemia due to chronic kidney disease, on chronic dialysis    Hyponatremia    Poor appetite    Moderate malnutrition    Chronic diastolic CHF (congestive heart failure)    Aortic valve lesion    Severe aortic valve regurgitation    Recent cerebrovascular accident (CVA)      33 y.o. female recovering after open heart surgery.  Chronically on peritoneal dialysis.  There was some discussion about  needing to convert over to hemodialysis for her to get a PEG tube.  Currently she is not agreeable to that plan.  We will follow-up as needed.    By our records here at [facility] she has had 1 tunneled catheter in her right jugular.    I discussed the patients findings and my recommendations with patient and nursing staff.    Omari Lopez MD  11/21/23  08:31 EST    Please call my office with any question: [phone]

## 2023-11-21 NOTE — PROGRESS NOTES
" LOS: 26 days   Patient Care Team:  Margarita Woods APRN as PCP - General (Nurse Practitioner)  Winnie Sanchez MD as Referring Physician (Obstetrics and Gynecology)  Norberto Almaraz MD PhD as Consulting Physician (Hematology and Oncology)  Lupis Thomas MD as Consulting Physician (Nephrology)  Hair Mckeon MD as Consulting Physician (Nephrology)  Juana Taylor MD as Consulting Physician (Cardiology)    Chief Complaint: post op    Subjective:  Symptoms:  Stable.  No shortness of breath, cough or chest pain.    Diet:  Poor intake.  No nausea or vomiting.    Activity level: Impaired due to weakness.    Pain:  She reports no pain.      Vital Signs  Temp:  [98.5 °F (36.9 °C)-99.3 °F (37.4 °C)] 99.3 °F (37.4 °C)  Heart Rate:  [78-87] 81  Resp:  [14-16] 14  BP: (128-145)/(101-108) 139/108  Body mass index is 19.31 kg/m².    Intake/Output Summary (Last 24 hours) at 11/21/2023 0927  Last data filed at 11/21/2023 0630  Gross per 24 hour   Intake 4960 ml   Output 8300 ml   Net -3340 ml     No intake/output data recorded.          11/19/23  0552 11/20/23  0417 11/21/23  0500   Weight: 50.9 kg (112 lb 4.8 oz) 54.3 kg (119 lb 9.6 oz) 52.6 kg (115 lb 14.4 oz)         Objective:  General Appearance:  Comfortable and in no acute distress.    Vital signs: (most recent): Blood pressure (!) 139/108, pulse 81, temperature 99.3 °F (37.4 °C), temperature source Oral, resp. rate 14, height 165 cm (64.96\"), weight 52.6 kg (115 lb 14.4 oz), last menstrual period 08/10/2022, SpO2 96%, not currently breastfeeding.  Vital signs are normal.  No fever.    Output: No urine output.    Lungs:  Normal effort and normal respiratory rate.  There are decreased breath sounds.    Heart: Normal rate.  Regular rhythm.  (SR on tele monitor)  Abdomen: Abdomen is soft and non-distended.  Hypoactive bowel sounds.     Extremities: There is no dependent edema.    Neurological: Patient is oriented to person, place and time.  (Sleeping ).  " "  Skin:  Warm and dry.  (Sternal incision clean, dry, and intact)          Results Review:        WBC WBC   Date Value Ref Range Status   11/21/2023 7.95 3.40 - 10.80 10*3/mm3 Final   11/20/2023 8.30 3.40 - 10.80 10*3/mm3 Final   11/19/2023 9.43 3.40 - 10.80 10*3/mm3 Final      HGB Hemoglobin   Date Value Ref Range Status   11/21/2023 9.9 (L) 12.0 - 15.9 g/dL Final   11/20/2023 9.1 (L) 12.0 - 15.9 g/dL Final   11/19/2023 9.5 (L) 12.0 - 15.9 g/dL Final      HCT Hematocrit   Date Value Ref Range Status   11/21/2023 30.3 (L) 34.0 - 46.6 % Final   11/20/2023 28.2 (L) 34.0 - 46.6 % Final   11/19/2023 28.3 (L) 34.0 - 46.6 % Final      Platelets Platelets   Date Value Ref Range Status   11/21/2023 210 140 - 450 10*3/mm3 Final   11/20/2023 201 140 - 450 10*3/mm3 Final   11/19/2023 180 140 - 450 10*3/mm3 Final        PT/INR:  No results found for: \"PROTIME\"/No results found for: \"INR\"    Sodium Sodium   Date Value Ref Range Status   11/21/2023 128 (L) 136 - 145 mmol/L Final   11/20/2023 130 (L) 136 - 145 mmol/L Final   11/19/2023 128 (L) 136 - 145 mmol/L Final      Potassium Potassium   Date Value Ref Range Status   11/21/2023 3.7 3.5 - 5.2 mmol/L Final     Comment:     Slight hemolysis detected by analyzer. Result may be falsely elevated.   11/20/2023 4.0 3.5 - 5.2 mmol/L Final   11/19/2023 3.9 3.5 - 5.2 mmol/L Final      Chloride Chloride   Date Value Ref Range Status   11/21/2023 90 (L) 98 - 107 mmol/L Final   11/20/2023 91 (L) 98 - 107 mmol/L Final   11/19/2023 92 (L) 98 - 107 mmol/L Final      Bicarbonate CO2   Date Value Ref Range Status   11/21/2023 25.1 22.0 - 29.0 mmol/L Final   11/20/2023 24.9 22.0 - 29.0 mmol/L Final   11/19/2023 25.1 22.0 - 29.0 mmol/L Final      BUN BUN   Date Value Ref Range Status   11/21/2023 35 (H) 6 - 20 mg/dL Final   11/20/2023 32 (H) 6 - 20 mg/dL Final   11/19/2023 27 (H) 6 - 20 mg/dL Final      Creatinine Creatinine   Date Value Ref Range Status   11/21/2023 5.37 (H) 0.57 - 1.00 mg/dL " Final   11/20/2023 5.01 (H) 0.57 - 1.00 mg/dL Final   11/19/2023 4.50 (H) 0.57 - 1.00 mg/dL Final      Calcium Calcium   Date Value Ref Range Status   11/21/2023 8.6 8.6 - 10.5 mg/dL Final   11/20/2023 8.4 (L) 8.6 - 10.5 mg/dL Final   11/19/2023 8.2 (L) 8.6 - 10.5 mg/dL Final      Magnesium Magnesium   Date Value Ref Range Status   11/20/2023 1.7 1.6 - 2.6 mg/dL Final   11/19/2023 2.0 1.6 - 2.6 mg/dL Final          aspirin, 81 mg, Oral, Daily  carvedilol, 25 mg, Oral, Q12H  chlorhexidine, 15 mL, Mouth/Throat, Q12H  Delflex-LC/2.5% Dextrose, 2,000 mL, Intraperitoneal, 5 Exchanges Daily - Dwell Overnight  escitalopram, 10 mg, Oral, Daily  First Mouthwash (Magic Mouthwash), 10 mL, Swish & Spit, Q6H  insulin regular, 2-7 Units, Subcutaneous, Q6H  Lacosamide, 100 mg, Intravenous, Q12H  lidocaine, 10 mL, Subcutaneous, Once  mupirocin, , Each Nare, BID  pantoprazole, 40 mg, Intravenous, Q AM      hold, 1 each      Assessment & Plan  - Ascending aortic aneurysm with dissection- s/p ascending aortic dissection repair/proximal replacement, gacron interposition graft, kelli procedure; insertion of right femoral shiley--- Pagni  - Cardiac tamponade- reexploration for bleeding, removal of large clot compressing the RV---POD#13; Camporrotondo  - severe aortic valve insufficiency  - Encephalopathy, improving   - ESRD on PD  - Lupus--on Cellcept/plaquenil; currently held  - Epilepsy  - chronic immunosuppression  - hypertension  - chronic anemia  - TCP--chronic; improving  - Depression--started on lexapro 11/14  - right MCA CVA--continue ASA per neuro    POD18:    Patient sleeping in bed, remains withdrawn with one word responses   Nurse reports she is refusing medications again this morning, refused breakfast   Nutrition seeing patient, adjusting feeds, counting calories -- nurse reports she ate 25% of breakfast and lunch   Vascular surgery saw patient, she is refusing PEG placement and wants to continue PD  Remains in NSR  MRI  with right MCA CVA -- Neurology recommends continuation of baby ASA, on Vimpat for seizures  Remains on PD -- Nephrology managing   Very weak -- continue aggressive PT/OT, should be up in the chair for all meals  Nursing reports Dr. Carter is reaching out to Psych to see patient   Continue supportive care    Antonina Emerson PA-C  11/21/23  09:27 EST

## 2023-11-21 NOTE — PLAN OF CARE
Problem: Malnutrition  Goal: Improved Nutritional Intake  Outcome: Ongoing, Not Progressing  Intervention: Promote and Optimize Nutrition Support  Flowsheets (Taken 11/21/2023 1009)  Nutrition Support Management: other (see comments)   Goal Outcome Evaluation:     TF orders for nocturnal @ 35 mL/hr with NS Renal. Water flush 30 mL Q 4 hr via Cortrak NG tube.     On PO diet but continues to eat very little, if at all. Suggested ^ TF to 24 hr/day to meet needs.    Cardiovascular ordered another calorie count. Will run x 48 hours (11/20 - 11/21).

## 2023-11-21 NOTE — PLAN OF CARE
Goal Outcome Evaluation:  Plan of Care Reviewed With: (P) patient, family           Outcome Evaluation: (P) Pt in bed with family present upon arrival. Agreeable to therapy this PM with encouragement. Pt performed bed mobility with min assist, STS x2 with min/mod assist, and ambulated with min hha. Pt able to perform STS from EOB with min assist, but required mod assist from toilet. Slower and slightly more unsteady this date with few minor LOB observed req min assist to correct. PT will cont to progress activity as pt tolerates.      Anticipated Discharge Disposition (PT): (P) skilled nursing facility, inpatient rehabilitation facility

## 2023-11-21 NOTE — CASE MANAGEMENT/SOCIAL WORK
Continued Stay Note  [facility]     Patient Name: Dee Herrera  MRN: [id number]  Today's Date: 11/21/2023    Admit Date: 10/26/2023    Plan: [facility] eval is ongoing.   Discharge Plan       Row Name 11/21/23 1440       Plan    Plan [facility] eval is ongoing.    Plan Comments S/W [facility] H/Sin Sorensenab ([phone]) to get update on Pt referral.  [facility] is still following.  Per Pt MD progress notes, discussion is being had about PEG placement and TDC placement.  Unknown at this time what the final decision will be.  Pt continues to get peritoneal dialysis daily at this time.  CCP following...........Елена RODRIGUEZ/ROBINSON                   Discharge Codes    No documentation.                 Expected Discharge Date and Time       Expected Discharge Date Expected Discharge Time    Nov 24, 2023               Елена Brown RN

## 2023-11-21 NOTE — PROGRESS NOTES
Hospital Follow Up    LOS:  LOS: 26 days   Patient Name: Dee Herrera  Age/Sex: 33 y.o. female  : 1990  MRN: [id number]    Day of Service: 23   Length of Stay: 26  Encounter Provider: CAESAR Michaels  Place of Service: [facility] CARDIOLOGY  Patient Care Team:  Margarita Woods APRN as PCP - General (Nurse Practitioner)  Winnie Sanchez MD as Referring Physician (Obstetrics and Gynecology)  Norberto Almaraz MD PhD as Consulting Physician (Hematology and Oncology)  Lupis Thomas MD as Consulting Physician (Nephrology)  Hair Mckeon MD as Consulting Physician (Nephrology)  Juana Taylor MD as Consulting Physician (Cardiology)    Subjective:     Chief Complaint: follow up aortic aneurysm/dissection, severe AR    Interval History: No complaints fo chest pain or SOA. Resting in bed.    Objective:     Objective:  Temp:  [98.5 °F (36.9 °C)-99.3 °F (37.4 °C)] 99.3 °F (37.4 °C)  Heart Rate:  [78-87] 81  Resp:  [14-16] 14  BP: (128-145)/(101-108) 139/108     Intake/Output Summary (Last 24 hours) at 2023 0826  Last data filed at 2023 0630  Gross per 24 hour   Intake 5198 ml   Output 8300 ml   Net -3102 ml     Body mass index is 19.31 kg/m².      23  0552 23  0417 23  0500   Weight: 50.9 kg (112 lb 4.8 oz) 54.3 kg (119 lb 9.6 oz) 52.6 kg (115 lb 14.4 oz)     Weight change: -1.678 kg (-3 lb 11.2 oz)    Physical Exam:   General Appearance:    Awake alert and oriented, flat affect   Color:  Skin:  Neuro:  HEENT:    Lungs:     Pink  Warm and dry  No focal, motor or sensory deficits  Neck supple, pupils equal, round and reactive. No JVD, No Bruit  Clear to auscultation,respirations regular, even and                  unlabored    Heart:    Regular rate and rhythm, S1 and S2, + sys murmur, no gallop, no rub. No edema, DP/PT pulses are 2+   Chest Wall:    Midsternal incision clean and dry   Abdomen:     Normal bowel sounds, no  "masses, no organomegaly, soft        non-tender, non-distended, no guarding, no ascites noted   Extremities:   Moves all extremities well, no edema, no cyanosis, no redness       Lab Review:   Results from last 7 days   Lab Units 11/21/23  0401 11/20/23  0317 11/19/23  0308 11/18/23  0330   SODIUM mmol/L 128* 130*   < > 130*   POTASSIUM mmol/L 3.7 4.0   < > 3.1*   CHLORIDE mmol/L 90* 91*   < > 94*   CO2 mmol/L 25.1 24.9   < > 26.0   BUN mg/dL 35* 32*   < > 29*   CREATININE mg/dL 5.37* 5.01*   < > 4.64*   GLUCOSE mg/dL 120* 94   < > 158*   CALCIUM mg/dL 8.6 8.4*   < > 8.6   AST (SGOT) U/L  --   --   --  34*   ALT (SGPT) U/L  --   --   --  21    < > = values in this interval not displayed.         Results from last 7 days   Lab Units 11/21/23  0713 11/20/23 0317   WBC 10*3/mm3 7.95 8.30   HEMOGLOBIN g/dL 9.9* 9.1*   HEMATOCRIT % 30.3* 28.2*   PLATELETS 10*3/mm3 210 201         Results from last 7 days   Lab Units 11/20/23  0317 11/19/23  0308   MAGNESIUM mg/dL 1.7 2.0           Invalid input(s): \"LDLCALC\"            I reviewed the patient's new clinical results.  I personally viewed and interpreted the patient's EKG  Current Medications:   Scheduled Meds:aspirin, 81 mg, Oral, Daily  carvedilol, 25 mg, Oral, Q12H  chlorhexidine, 15 mL, Mouth/Throat, Q12H  Delflex-LC/2.5% Dextrose, 2,000 mL, Intraperitoneal, 5 Exchanges Daily - Dwell Overnight  escitalopram, 10 mg, Oral, Daily  First Mouthwash (Magic Mouthwash), 10 mL, Swish & Spit, Q6H  insulin regular, 2-7 Units, Subcutaneous, Q6H  Lacosamide, 100 mg, Intravenous, Q12H  lidocaine, 10 mL, Subcutaneous, Once  mupirocin, , Each Nare, BID  pantoprazole, 40 mg, Intravenous, Q AM      Continuous Infusions:hold, 1 each        Allergies:  Allergies   Allergen Reactions    Minoxidil Hives and Other (See Comments)     Pericardial effusion .       Assessment:       Dissecting ascending aortic aneurysm    Vitamin D deficiency    Thrombocytopenia    Hypertension secondary to " other renal disorders    Pancytopenia    Systemic lupus erythematosus    Pericardial effusion    End stage renal disease on dialysis    Seizure disorder    Elevated liver function tests    Essential hypertension    Peritoneal dialysis catheter in place    Anemia due to chronic kidney disease, on chronic dialysis    Hyponatremia    Poor appetite    Moderate malnutrition    Chronic diastolic CHF (congestive heart failure)    Aortic valve lesion    Severe aortic valve regurgitation    Recent cerebrovascular accident (CVA)        Plan:   Ascending aortic aneurysm with subacute/chronic aortic root dissection: s/p repair and proximal aortic replacement on 11/2.   Severe aortic regurgitation: s/p repair on 11/2.   Cardiac tamponade: secondary to pericardial thrombus anteriorly and compressing right ventricle. S/p reexploration with clot removal and treatment of a small amount of bleeding at the suture line on 11/6.  Cardiogenic shock: due to #3. Resolved  Seizures: postoperatively. EEG yesterday normal.   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 On PD at home. HD postoperatively, last session on 11/13. Now switched back to PD.  Hyponatremia: resolved  HTN: blood pressure well controlled. On amlodipine and carvedilol. Losartan added yesterday.  Chronic anemia: stable  SLE  Depression: Access center has seen patient. At this point, she denies any depression. She was started on escitalopram.    Right MCA CVA: confirmed by MRI. Neurology expects full recovery.    -Refused morning meds yesterday. When attempted to give again, she had emesis. Very poor PO intake.  -Evaluated for PEG tube placement. Patient is not agreeable at this time and will attempt to increase oral intake.      CAESAR Michaels  11/21/23  08:26 EST  Electronically signed by CAESAR Damon, 11/15/23, 9:03 AM EST.

## 2023-11-21 NOTE — PLAN OF CARE
Goal Outcome Evaluation:  Plan of Care Reviewed With: patient           Outcome Evaluation: Pt supine in bed upon arrival. Declined all bed mobility but agreeable to UE ther ex supine in bed. AAROM performed with BUE. Declined participation in ADLs today. pt answered yes or no questions throughout session at times.      Anticipated Discharge Disposition (OT): skilled nursing facility

## 2023-11-21 NOTE — PROGRESS NOTES
" LOS: 26 days     Name: Dee Herrera  Age: 33 y.o.  Sex: female  :  1990  MRN: [id number]         Primary Care Physician: Margarita Woods APRN    Subjective   Subjective  Patient had reported vomiting yesterday.  No vomiting overnight last night and nurse reports that she has been tolerating the nocturnal tube feeds.  Patient reports some mild abdominal discomfort today.  Refusing oral medications.  Continues to have minimal to no oral intake.  She continues to be minimally interactive and gives me one-word answers to all questions.  Nursing notes indicate that she was up in the chair for most of the day yesterday.  She is now refusing tunneled dialysis catheter placement/PEG tube placement.    Objective   Vital Signs  Temp:  [98.5 °F (36.9 °C)-99.3 °F (37.4 °C)] 99.3 °F (37.4 °C)  Heart Rate:  [78-87] 81  Resp:  [14-16] 14  BP: (128-145)/(101-108) 139/108  Body mass index is 19.31 kg/m².    Objective:  General Appearance:  Comfortable, in no acute distress and ill-appearing (Weak and deconditioned).    Vital signs: (most recent): Blood pressure (!) 147/112, pulse 92, temperature 99 °F (37.2 °C), temperature source Oral, resp. rate 14, height 165 cm (64.96\"), weight 52.6 kg (115 lb 14.4 oz), last menstrual period 08/10/2022, SpO2 98%, not currently breastfeeding.    Lungs:  Normal effort and normal respiratory rate.  She is not in respiratory distress.  There are decreased breath sounds.    Heart: Normal rate.  Regular rhythm.    Abdomen: Abdomen is soft.  Bowel sounds are normal.   There is no abdominal tenderness.     Extremities: There is no dependent edema or local swelling.    Neurological: (She wakes up for me today and follows basic commands.  She gives one-word answers to any form of questioning.  Minimally interactive).    Skin:  Warm and dry.                Results Review:       I reviewed the patient's new clinical results.    Results from last 7 days   Lab Units 23  0713 " 23  0317 23  0308 23  0330 23  0243 23  0242 11/15/23  0239   WBC 10*3/mm3 7.95 8.30 9.43 10.96* 13.12* 11.48* 10.62   HEMOGLOBIN g/dL 9.9* 9.1* 9.5* 9.1* 8.5* 8.9* 8.2*   PLATELETS 10*3/mm3 210 201 180 172 154 136* 118*     Results from last 7 days   Lab Units 23  0401 23  0317 23  0308 23  0330 23  0243 23  0242 11/15/23  0239   SODIUM mmol/L 128* 130* 128* 130* 130* 130* 130*   POTASSIUM mmol/L 3.7 4.0 3.9 3.1* 3.2* 3.0* 3.3*   CHLORIDE mmol/L 90* 91* 92* 94* 94* 96* 98   CO2 mmol/L 25.1 24.9 25.1 26.0 24.6 24.9 20.7*   BUN mg/dL 35* 32* 27* 29* 31* 28* 26*   CREATININE mg/dL 5.37* 5.01* 4.50* 4.64* 4.60* 4.52* 4.03*   CALCIUM mg/dL 8.6 8.4* 8.2* 8.6 8.1* 8.4* 8.8   GLUCOSE mg/dL 120* 94 107* 158* 189* 130* 112*                 Scheduled Meds:   aspirin, 81 mg, Oral, Daily  carvedilol, 25 mg, Oral, Q12H  chlorhexidine, 15 mL, Mouth/Throat, Q12H  Delflex-LC/2.5% Dextrose, 2,000 mL, Intraperitoneal, 5 Exchanges Daily - Dwell Overnight  escitalopram, 10 mg, Oral, Daily  First Mouthwash (Magic Mouthwash), 10 mL, Swish & Spit, Q6H  insulin regular, 2-7 Units, Subcutaneous, Q6H  Lacosamide, 100 mg, Intravenous, Q12H  lidocaine, 10 mL, Subcutaneous, Once  mupirocin, , Each Nare, BID  pantoprazole, 40 mg, Intravenous, Q AM      PRN Meds:     acetaminophen **OR** acetaminophen **OR** acetaminophen    bisacodyl    bisacodyl    dextrose    dextrose    glucagon (human recombinant)    heparin (porcine)    hold    hydrALAZINE    ipratropium-albuterol    [] Morphine **AND** naloxone    nitroglycerin    ondansetron    polyethylene glycol  Continuous Infusions:  hold, 1 each        Assessment & Plan   Active Hospital Problems    Diagnosis  POA    **Dissecting ascending aortic aneurysm [I71.010]  Yes    Recent cerebrovascular accident (CVA) [Z86.73]  Yes    Aortic valve lesion [I35.8]  Yes    Severe aortic valve regurgitation [I35.1]  Yes    Hyponatremia  [E87.1]  Yes    Poor appetite [R63.0]  Yes    Moderate malnutrition [E44.0]  Yes    Chronic diastolic CHF (congestive heart failure) [I50.32]  Yes    Anemia due to chronic kidney disease, on chronic dialysis [N18.6, D63.1, Z99.2]  Not Applicable    Peritoneal dialysis catheter in place [Z99.2]  Not Applicable    Essential hypertension [I10]  Yes    End stage renal disease on dialysis [N18.6, Z99.2]  Not Applicable    Seizure disorder [G40.909]  Yes    Elevated liver function tests [R79.89]  Yes    Pericardial effusion [I31.39]  Yes    Systemic lupus erythematosus [M32.9]  Yes    Thrombocytopenia [D69.6]  Yes    Hypertension secondary to other renal disorders [I15.1]  Yes    Pancytopenia [D61.818]  Yes    Vitamin D deficiency [E55.9]  Yes      Resolved Hospital Problems    Diagnosis Date Resolved POA    Abdominal pain [R10.9] 10/28/2023 Yes       Assessment & Plan    -Oral intake and nutrition status remained poor.  Dietitian recommends converting to continuous tube feeds today to keep up with her requirements.  Patient now refusing PEG tube placement.  -Check KUB given the reported vomiting yesterday and abdominal discomfort.  She tolerated tube feeds without issue overnight last night and has not vomited this morning.  -Neurology has provided recommendations regarding her acute CVA.  Currently on low-dose aspirin.  On Vimpat for seizures.  -Keppra discontinued given possible effects on her mood.  Remains on Lexapro.  -She appears extremely depressed and withdrawn.  I will consult psychiatry.  Patient is refusing oral medications at this time.  -Continues on peritoneal dialysis as per nephrology.  Noted is there additional work-up to assess for lupus cerebritis.  -CellCept and Plaquenil for her history of SLE is currently on hold.  -Blood sugars well controlled.  Continue low-dose sliding scale insulin  -Aggressive therapy     Dispo  To be determined    I called her mother and discussed the situation with her.  She  is coming to the hospital today to speak with her daughter and attempt to get her to agree to pursue PEG tube placement.  Her mother is understanding of the situation and our need to move forward in her medical care if there is any hope of getting her better.    Expected Discharge Date: 11/24/2023; Expected Discharge Time:      Bobby Carter MD  [facility]  11/21/23  10:26 EST

## 2023-11-21 NOTE — NURSING NOTE
"Access Center follow-up d/t depression.     Patient RIB and does not make eye contact. Patient does not engage much in conversation and when she did answer a question the patient whispered. When asked how she was doing the patient answered \"groggy\" The patient shoo her head \"no' when asked about depression. This writer told the patient we are here to support her and are here to listen if she wants to talk. The patient shook her head in acknowledgement. Access following.   "

## 2023-11-21 NOTE — PROGRESS NOTES
[facility] Progress Note      Patient Name: Dee Herrera  : 1990  MRN: [id number]  Primary Care Physician:  Margarita Woods, CAESAR  Date of admission: 10/26/2023    Subjective     Interval History:   Follow-up ESRD.  Very withdrawn and gives basically one-word answers but whispers and audibly.  Not eating anything.  Refusing p.o. meds.  Declined placement of PEG.  As her bowels have moved a little.  Now on 24-hour tube feeds via cortrack.    Review of Systems:   As noted above    Objective     Vitals:   Temp:  [98.5 °F (36.9 °C)-99.3 °F (37.4 °C)] 99.3 °F (37.4 °C)  Heart Rate:  [78-87] 81  Resp:  [14-16] 14  BP: (128-145)/(101-108) 139/108    Intake/Output Summary (Last 24 hours) at 2023 1212  Last data filed at 2023 0900  Gross per 24 hour   Intake 2990 ml   Output 5400 ml   Net -2410 ml       Physical Exam:    General Appearance: Very withdrawn.  Does not open her eyes during conversation.  Voice barely a whisper.  Skin: warm and dry  HEENT: oral mucosa dry.  Core track in place.  Nonicteric sclera  Neck: supple, no JVD  Lungs: clear to auscultation bilaterally.  Heart: RRR, normal S1 and S2  Abdomen: soft, nontender, nondistended.  PD catheter exit site clean.  Extremities: no edema.  Neuro: Withdrawn.  Barely speaks.    Scheduled Meds:     aspirin, 81 mg, Oral, Daily  carvedilol, 25 mg, Oral, Q12H  chlorhexidine, 15 mL, Mouth/Throat, Q12H  Delflex-LC/2.5% Dextrose, 2,000 mL, Intraperitoneal, 5 Exchanges Daily - Dwell Overnight  escitalopram, 10 mg, Oral, Daily  First Mouthwash (Magic Mouthwash), 10 mL, Swish & Spit, Q6H  insulin regular, 2-7 Units, Subcutaneous, Q6H  Lacosamide, 100 mg, Intravenous, Q12H  lidocaine, 10 mL, Subcutaneous, Once  mupirocin, , Each Nare, BID  pantoprazole, 40 mg, Intravenous, Q AM      IV Meds:   hold, 1 each        Results Reviewed:   I have personally reviewed the results from the time of this admission to 2023 12:12 EST      Results from last 7 days   Lab Units 11/21/23  0401 11/20/23  0317 11/19/23  0308 11/18/23  0330   SODIUM mmol/L 128* 130* 128* 130*   POTASSIUM mmol/L 3.7 4.0 3.9 3.1*   CHLORIDE mmol/L 90* 91* 92* 94*   CO2 mmol/L 25.1 24.9 25.1 26.0   BUN mg/dL 35* 32* 27* 29*   CREATININE mg/dL 5.37* 5.01* 4.50* 4.64*   CALCIUM mg/dL 8.6 8.4* 8.2* 8.6   BILIRUBIN mg/dL  --   --   --  0.2   ALK PHOS U/L  --   --   --  74   ALT (SGPT) U/L  --   --   --  21   AST (SGOT) U/L  --   --   --  34*   GLUCOSE mg/dL 120* 94 107* 158*       Estimated Creatinine Clearance: 12.6 mL/min (A) (by C-G formula based on SCr of 5.37 mg/dL (H)).    Results from last 7 days   Lab Units 11/21/23  0401 11/20/23  0317 11/19/23  0308 11/18/23  0330   MAGNESIUM mg/dL  --  1.7 2.0 1.5*   PHOSPHORUS mg/dL 5.0* 4.6* 4.5 1.5*             Results from last 7 days   Lab Units 11/21/23  0713 11/20/23  0317 11/19/23  0308 11/18/23  0330 11/17/23  0243   WBC 10*3/mm3 7.95 8.30 9.43 10.96* 13.12*   HEMOGLOBIN g/dL 9.9* 9.1* 9.5* 9.1* 8.5*   PLATELETS 10*3/mm3 210 201 180 172 154             Assessment / Plan     ASSESSMENT:  ESRD.  Currently on peritoneal dialysis.  Her clearance appears to be adequate however, she has poor muscle mass so her creatinine may actually reflect inadequate dialysis.  Refuses PEG tube for nutrition.  Plan was originally to switch to hemodialysis temporarily until she can get nutritionally replete.  2.  Acute CVA cortical infarct in the anterior inferior medial right frontal lobe.  Subacute subdural hematoma right occipital.  3.  Ascending aortic aneurysm with subacute/chronic aortic root dissection.  Status post repair of proximal aortic aneurysm 11/2/2023.  4.  Seizure disorder currently on Vimpat  5.  Severe protein calorie malnutrition now on tube feeds.  Refuses PEG.  6.  SLE.  Currently off immunosuppression.  C3 mildly low, C4 normal.  Anti-double-stranded DNA antibody sent 11/20/2023.  7.  Anemia CKD.  PLAN:  1.Recheck thyroid  function  2.  I instructed patient that if some of her symptoms are from inadequate dialysis that hemodialysis might be more helpful.  It would certainly allow better nutritional status in order to place  PEG.    Thank you for involving us in the care of Dee Herrera.  Please feel free to call with any questions.    Regi Hyde MD  11/21/23  12:12 [facility]    [facility]  [phone]    Please note that portions of this note were completed with a voice recognition program.

## 2023-11-21 NOTE — CONSULTS
Provided phase II information along with the contact information for cardiac rehab here at [facility].

## 2023-11-21 NOTE — CONSULTS
IDENTIFYING INFORMATION: The patient is a 33-year-old -American female admitted for repair of abdominal aortic aneurysm on 11/2/2023.  She is seen by psychiatry related to suspicion of depression    CHIEF COMPLAINT: None given    INFORMANT: Staff and chart, patient does not participate in interview to any degree.    RELIABILITY: Limited    HISTORY OF PRESENT ILLNESS: The patient is a 33-year-old -American female with multiple medical issues including end-stage renal disease, lupus, asthma and a history of recent abdominal aortic aneurysm repair.  She is seen by psychiatry related to possible depression.  Staff reports that the patient has been less than optimally cooperative with physical therapy and care.  Staff also reports that the patient is virtually unresponsive as she is during today's interview.  She does not endorse any suicidal or homicidal ideation or psychotic symptoms and shakes her head no when asked if she feels depressed.  Otherwise her participation with the interview is virtually nil.    PAST PSYCHIATRIC HISTORY: None reported    PAST MEDICAL HISTORY: As above    MEDICATIONS:   Current Facility-Administered Medications   Medication Dose Route Frequency Provider Last Rate Last Admin    acetaminophen (TYLENOL) tablet 650 mg  650 mg Oral Q4H PRN Shea Monteiro APRN   650 mg at 11/18/23 1457    Or    acetaminophen (TYLENOL) 160 MG/5ML oral solution 650 mg  650 mg Oral Q4H PRN Shea Monteiro APRN   650 mg at 11/20/23 1937    Or    acetaminophen (TYLENOL) suppository 650 mg  650 mg Rectal Q4H PRN Shea Monteiro APRN        aspirin chewable tablet 81 mg  81 mg Oral Daily Shea Monteiro APRN   81 mg at 11/19/23 0803    bisacodyl (DULCOLAX) EC tablet 10 mg  10 mg Oral Daily PRN Shea Monteiro APRN   10 mg at 11/08/23 2052    bisacodyl (DULCOLAX) suppository 10 mg  10 mg Rectal Daily PRN Shea Monteiro APRN        carvedilol (COREG) tablet 25 mg  25 mg Oral Q12H Regi Hyde MD         chlorhexidine (PERIDEX) 0.12 % solution 15 mL  15 mL Mouth/Throat Q12H Shea Monteiro APRN   15 mL at 11/18/23 0853    Delflex-LC/2.5% Dextrose (DIANEAL) CAPD 2,000 mL  2,000 mL Intraperitoneal 5 Exchanges Daily - Dwell Overnight Lance Martínez MD   2,000 mL at 11/21/23 1210    dextrose (D50W) (25 g/50 mL) IV injection 25 g  25 g Intravenous Q15 Min PRN Delia Stern APRN   25 g at 11/08/23 0020    dextrose (GLUTOSE) oral gel 15 g  15 g Oral Q15 Min PRN Delia Stern APRN        escitalopram (LEXAPRO) tablet 10 mg  10 mg Oral Daily Lakisha Hunt MD   10 mg at 11/19/23 0952    First Mouthwash (Magic Mouthwash) 10 mL  10 mL Swish & Spit Q6H Delia Stern APRN   10 mL at 11/17/23 0850    glucagon (GLUCAGEN) injection 1 mg  1 mg Intramuscular Q15 Min PRN Delia Stern APRN        heparin (porcine) injection 3,000 Units  3,000 Units Intracatheter PRN Shea Monteiro APRN   3,000 Units at 11/13/23 1255    Hold medication  1 each Does not apply Continuous PRN Delia Stern APRN        hydrALAZINE (APRESOLINE) injection 20 mg  20 mg Intravenous Q4H PRN Shea Monteiro APRN   20 mg at 11/09/23 1409    insulin regular (humuLIN R,novoLIN R) injection 2-7 Units  2-7 Units Subcutaneous Q6H Delia Stern APRN   3 Units at 11/18/23 0758    ipratropium-albuterol (DUO-NEB) nebulizer solution 3 mL  3 mL Nebulization Q4H PRN Shea Monteiro APRN   3 mL at 11/13/23 0711    lacosamide (VIMPAT) injection 100 mg  100 mg Intravenous Q12H Roseline Jordan APRN   100 mg at 11/21/23 0319    lidocaine (XYLOCAINE) 1 % injection 10 mL  10 mL Subcutaneous Once Payam Phillips MD        mupirocin (BACTROBAN) 2 % nasal ointment   Each Nare BID Shea Monteiro APRN   1 application  at 11/20/23 0823    naloxone (NARCAN) injection 0.4 mg  0.4 mg Intravenous Q5 Min PRN Shea Monteiro APRN        nitroglycerin (NITROSTAT) SL tablet 0.4 mg  0.4 mg Sublingual Q5 Min PRN Shea Monteiro APRN         ondansetron (ZOFRAN) injection 4 mg  4 mg Intravenous Q6H PRN Shea Monteiro APRN   4 mg at 11/20/23 1937    pantoprazole (PROTONIX) injection 40 mg  40 mg Intravenous Q AM Delia Stern APRN   40 mg at 11/21/23 0707    polyethylene glycol (MIRALAX) packet 17 g  17 g Oral Daily PRN Shea Monteiro APRN             ALLERGIES: Minoxidil    FAMILY HISTORY: Not obtained    SOCIAL HISTORY: Patient lives with her mother.  She reportedly completed high school and works at [employer]    MENTAL STATUS EXAM: The patient is a well-developed well-nourished -American female appearing stated age.  She is in no apparent physical distress at the time of examination.  She is sitting on the side of the bed.  She makes no eye contact and is virtually mute throughout attempted interview though she does shake her head no when asked about suicidal ideations or depression.    ASSETS/LIABILITIES: To be assessed    DIAGNOSTIC IMPRESSION: Adjustment disorder with mixed emotional features, medical problems as described previously    PLAN: At this point the patient is not participating in interview and is denying any depressive symptoms.  Unfortunately, I do not believe there is much more that I can offer given the circumstances and will sign off unless needed further.

## 2023-11-21 NOTE — PROGRESS NOTES
"Nutrition Services    Patient Name:  Dee Herrera  YOB: 1990  MRN: [id number]  Admit Date:  10/26/2023    Assessment Date:  11/21/23    Summary: TF follow up  Current TF order: Novasource Renal @ 35 ml/hr (goal 35 ml/hr) w/ 30 ml q 4 hrs free water flushes via cortrak to run from 8p-8a (12 hours/day).     Clogged Cortrak tube removed on 11/20 and replaced with new Cortrak by RD associate, in gastric position.     Pt now refusing to have PEG placed according to general surgery's note, thus no plan to transition from PD to HD. Pt's RN today reports pt refusing meals, meds & care in general.    Oral intake continues to be sub-optimal. PO 25% x 2 meals on previous date, no dinner intake. Another calorie count was ordered by CV services - envelope hung outside pt's room by RD. Intake has not changed since previous calorie count done. Continue to recommend long-term feeding tube to meet pt's needs. In the interim, would suggest increasing to 24 hour, continuous drip through her Cortrak tube.     Labs: Na 128, Cl 90, BUN 35, Cr 5.37, Phos 5.0, Alb 2.1    Plan/recommend:  Adjust TF to continuous drip to adequately meet kcal, protein needs. Same rate of 35 mL/hr, defer water flush to nephrology.  Continue to monitor/encourage PO intake.     RD following. Plan d/w [facility] on CVI.    CLINICAL NUTRITION ASSESSMENT      Reason for Assessment Follow-up Protocol     Diagnosis/Problem SOA, hyponatremia, ESRD on dialysis, abd pain, poor oral intake     Anthropometrics        Current Height  Current Weight  BMI kg/m2 Height: 165 cm (64.96\")  Weight: 52.6 kg (115 lb 14.4 oz) (11/21/23 0500)  Body mass index is 19.31 kg/m².   Adjusted BMI (if applicable)    BMI Category Normal/Healthy (18.4 - 24.9)   Ideal Body Weight (IBW) 125lb   Usual Body Weight (UBW) 120-155   Weight Trend Loss, 5lbs recently, 35lb overall      11/19/23  0552 11/20/23  0417 11/21/23  0500   Weight: 50.9 kg (112 lb 4.8 oz) 54.3 kg (119 lb 9.6 oz) " 52.6 kg (115 lb 14.4 oz)       --  Estimated/Assessed Needs        Current Weight  Weight: 55 kg (121 lb 4.1 oz) (11/06/23 1340)       Energy Requirements    Weight for Calculation 52.2 kg   Method for Estimation  30-35 kcal/kg   EST Needs (kcal/day) 9131-8888       Protein Requirements    Weight for Calculation 52.2 kg   EST Protein Needs (g/kg) 1.0 gm/kg, 1.5 gm/kg   EST Daily Needs (g/day) 52-78       Fluid Requirements     Method for Estimation 1 mL/kcal    EST Needs (mL/day)      Labs       Pertinent Labs    Results from last 7 days   Lab Units 11/21/23  0401 11/20/23 0317 11/19/23  0308 11/18/23  0330   SODIUM mmol/L 128* 130* 128* 130*   POTASSIUM mmol/L 3.7 4.0 3.9 3.1*   CHLORIDE mmol/L 90* 91* 92* 94*   CO2 mmol/L 25.1 24.9 25.1 26.0   BUN mg/dL 35* 32* 27* 29*   CREATININE mg/dL 5.37* 5.01* 4.50* 4.64*   CALCIUM mg/dL 8.6 8.4* 8.2* 8.6   BILIRUBIN mg/dL  --   --   --  0.2   ALK PHOS U/L  --   --   --  74   ALT (SGPT) U/L  --   --   --  21   AST (SGOT) U/L  --   --   --  34*   GLUCOSE mg/dL 120* 94 107* 158*     Results from last 7 days   Lab Units 11/21/23  0713 11/21/23 0401 11/20/23 0317 11/19/23  0308 11/18/23  0330   MAGNESIUM mg/dL  --   --  1.7 2.0 1.5*   PHOSPHORUS mg/dL  --  5.0* 4.6* 4.5 1.5*   HEMOGLOBIN g/dL 9.9*  --  9.1* 9.5* 9.1*   HEMATOCRIT % 30.3*  --  28.2* 28.3* 27.4*   WBC 10*3/mm3 7.95  --  8.30 9.43 10.96*   ALBUMIN g/dL  --  2.1* 2.1* 2.1* 2.4*     Results from last 7 days   Lab Units 11/21/23  0713 11/20/23  0317 11/19/23  0308 11/18/23  0330 11/17/23  0243   PLATELETS 10*3/mm3 210 201 180 172 154     COVID19   Date Value Ref Range Status   10/31/2023 Not Detected Not Detected - Ref. Range Final     Lab Results   Component Value Date    HGBA1C 5.10 10/30/2023          Medications           Scheduled Medications aspirin, 81 mg, Oral, Daily  carvedilol, 25 mg, Oral, Q12H  chlorhexidine, 15 mL, Mouth/Throat, Q12H  Delflex-LC/2.5% Dextrose, 2,000 mL, Intraperitoneal, 5 Exchanges  Daily - Dwell Overnight  escitalopram, 10 mg, Oral, Daily  First Mouthwash (Magic Mouthwash), 10 mL, Swish & Spit, Q6H  insulin regular, 2-7 Units, Subcutaneous, Q6H  Lacosamide, 100 mg, Intravenous, Q12H  lidocaine, 10 mL, Subcutaneous, Once  mupirocin, , Each Nare, BID  pantoprazole, 40 mg, Intravenous, Q AM       Infusions hold, 1 each       PRN Medications   acetaminophen **OR** acetaminophen **OR** acetaminophen    bisacodyl    bisacodyl    dextrose    dextrose    glucagon (human recombinant)    heparin (porcine)    hold    hydrALAZINE    ipratropium-albuterol    [] Morphine **AND** naloxone    nitroglycerin    ondansetron    polyethylene glycol     Physical Findings          General Findings room air   Oral/Mouth Cavity dry mouth, tooth or teeth missing   Edema  generalized, 1+ (trace)   Gastrointestinal normoactive, last bowel movement:    Skin  surgical incision: sternal, abdomen   Tubes/Drains/Lines Cortrak, dialysis catheter, NG tube, bridle in place @95 cm   NFPE See Malnutrition Severity Assessment -10/26/23   --  Malnutrition Severity Assessment      Patient meets criteria for : Moderate (non-severe) Malnutrition       Current Nutrition Orders & Evaluation of Intake       Oral Nutrition     Food Allergies NKFA   Current PO Diet Diet: Regular/House Diet, Fluid Restriction (240 mL/tray) Diets; 1000 mL/day; Feeding Assistance - Nursing; Texture: Soft to Chew (NDD 3); Soft to Chew: Chopped Meat; Fluid Consistency: Thin (IDDSI 0)   Supplement n/a   PO Evaluation     % PO Intake 25% x 2 meals  -, refused dinner; refused breakfast today    Factors Affecting Intake: altered respiratory status, decreased appetite   --   Enteral Nutrition     Enteral Route NG - Cortrak    TF Delivery Method Cyclic    Propofol Rate/Kcal     Current TF Order/Rate  Novasource Renal @ 35 mL/hr    TF Goal Rate 35 mL/hr x 12 hours/day (8p-8a)    Current Water Flush 30 mL Q 4 hr    Modular None    TF Residual  no or  minimal residual    TF Tolerance tolerating    TF Observation Verified TF held at this time     PES STATEMENT / NUTRITION DIAGNOSIS      Nutrition Dx Problem  Problem: Unintentional Weight Loss, Malnutrition (moderate), and Inadequate Oral Intake  Etiology: Factors Affecting Nutrition - decrease appetite, abd pain    Signs/Symptoms: PO intake, NFPE Results, Unintended Weight Change, and Report/Observation     NUTRITION INTERVENTION / PLAN OF CARE      Intervention Goal(s) Maintain nutrition status, Reduce/improve symptoms, Meet estimated needs, Disease management/therapy, Tolerate PO , Increase intake, Maintain TF/PN, and Maintain weight         RD Intervention/Action Await initiation of EN/PN, Continue to monitor, Care plan reviewed, and Recommend/order: EN   --      Prescription/Orders:       PO Diet       Supplements       Enteral Nutrition    Enteral Prescription:     Enteral Route NG    TF Delivery Method Continuous    Enteral Product Novasource Renal    Modular None    Propofol Rate/Kcal     TF Start Rate  35 mL/hr     TF Goal Rate  35 mL/hr     Free Water Flush 30 mL Q 4 hr    Provision at Goal:          Calories 1680 kcal, meets 100 % needs         Protein  76 gm protein, meets 100 % needs         Fluid (mL) 604 mL free water + 180 mL in flushes    Prescription Ordered Continue same per protocol         Parenteral Nutrition    New Prescription Ordered? Continue same per protocol         Monitor/Evaluation Per protocol, PO intake, Pertinent labs, EN delivery/tolerance, Weight, Skin status, GI status, Symptoms, Swallow function   Discharge Plan/Needs Pending clinical course       RD to follow per protocol.      Electronically signed by:  Delia Carrasquillo RD  11/21/23 09:12 EST

## 2023-11-21 NOTE — NURSING NOTE
Pt c/o chest pain 8/10, sharp pressure. Ordered troponin, potassium, EKG, and mag. X1 of nitro given. Reassessed chest pain remained 7/10, sharp pressure. Additional dose of nitro given. Reassessed pt, she stated pain improved.     1820- Troponin 407. Call out to cardiology. NP aware and asked to redraw in 2 hours and give tylenol.

## 2023-11-22 ENCOUNTER — APPOINTMENT (OUTPATIENT)
Dept: GENERAL RADIOLOGY | Facility: HOSPITAL | Age: 33
DRG: 219 | End: 2023-11-22
Payer: MEDICARE

## 2023-11-22 LAB
ALBUMIN SERPL-MCNC: 2.4 G/DL (ref 3.5–5.2)
ANION GAP SERPL CALCULATED.3IONS-SCNC: 11.5 MMOL/L (ref 5–15)
BASOPHILS # BLD AUTO: 0.02 10*3/MM3 (ref 0–0.2)
BASOPHILS NFR BLD AUTO: 0.2 % (ref 0–1.5)
BUN SERPL-MCNC: 43 MG/DL (ref 6–20)
BUN/CREAT SERPL: 8.8 (ref 7–25)
CALCIUM SPEC-SCNC: 8.8 MG/DL (ref 8.6–10.5)
CHLORIDE SERPL-SCNC: 88 MMOL/L (ref 98–107)
CO2 SERPL-SCNC: 27.5 MMOL/L (ref 22–29)
CREAT SERPL-MCNC: 4.88 MG/DL (ref 0.57–1)
DEPRECATED RDW RBC AUTO: 43.1 FL (ref 37–54)
DSDNA AB SER-ACNC: 33 IU/ML (ref 0–9)
EGFRCR SERPLBLD CKD-EPI 2021: 11.6 ML/MIN/1.73
EOSINOPHIL # BLD AUTO: 0.03 10*3/MM3 (ref 0–0.4)
EOSINOPHIL NFR BLD AUTO: 0.2 % (ref 0.3–6.2)
ERYTHROCYTE [DISTWIDTH] IN BLOOD BY AUTOMATED COUNT: 14.3 % (ref 12.3–15.4)
GLUCOSE BLDC GLUCOMTR-MCNC: 108 MG/DL (ref 70–130)
GLUCOSE BLDC GLUCOMTR-MCNC: 115 MG/DL (ref 70–130)
GLUCOSE BLDC GLUCOMTR-MCNC: 163 MG/DL (ref 70–130)
GLUCOSE BLDC GLUCOMTR-MCNC: 297 MG/DL (ref 70–130)
GLUCOSE SERPL-MCNC: 112 MG/DL (ref 65–99)
HCT VFR BLD AUTO: 30.1 % (ref 34–46.6)
HGB BLD-MCNC: 10.1 G/DL (ref 12–15.9)
IMM GRANULOCYTES # BLD AUTO: 0.09 10*3/MM3 (ref 0–0.05)
IMM GRANULOCYTES NFR BLD AUTO: 0.7 % (ref 0–0.5)
LYMPHOCYTES # BLD AUTO: 0.34 10*3/MM3 (ref 0.7–3.1)
LYMPHOCYTES NFR BLD AUTO: 2.8 % (ref 19.6–45.3)
MAGNESIUM SERPL-MCNC: 2.2 MG/DL (ref 1.6–2.6)
MCH RBC QN AUTO: 27.8 PG (ref 26.6–33)
MCHC RBC AUTO-ENTMCNC: 33.6 G/DL (ref 31.5–35.7)
MCV RBC AUTO: 82.9 FL (ref 79–97)
MONOCYTES # BLD AUTO: 1.13 10*3/MM3 (ref 0.1–0.9)
MONOCYTES NFR BLD AUTO: 9.3 % (ref 5–12)
NEUTROPHILS NFR BLD AUTO: 10.49 10*3/MM3 (ref 1.7–7)
NEUTROPHILS NFR BLD AUTO: 86.8 % (ref 42.7–76)
NRBC BLD AUTO-RTO: 0 /100 WBC (ref 0–0.2)
PHOSPHATE SERPL-MCNC: 3.7 MG/DL (ref 2.5–4.5)
PLATELET # BLD AUTO: 235 10*3/MM3 (ref 140–450)
PMV BLD AUTO: 11.5 FL (ref 6–12)
POTASSIUM SERPL-SCNC: 4 MMOL/L (ref 3.5–5.2)
QT INTERVAL: 362 MS
QTC INTERVAL: 441 MS
RBC # BLD AUTO: 3.63 10*6/MM3 (ref 3.77–5.28)
SODIUM SERPL-SCNC: 127 MMOL/L (ref 136–145)
TROPONIN T SERPL HS-MCNC: 438 NG/L
WBC NRBC COR # BLD AUTO: 12.1 10*3/MM3 (ref 3.4–10.8)

## 2023-11-22 PROCEDURE — 99221 1ST HOSP IP/OBS SF/LOW 40: CPT | Performed by: NURSE PRACTITIONER

## 2023-11-22 PROCEDURE — 99232 SBSQ HOSP IP/OBS MODERATE 35: CPT | Performed by: NURSE PRACTITIONER

## 2023-11-22 PROCEDURE — 74018 RADEX ABDOMEN 1 VIEW: CPT

## 2023-11-22 PROCEDURE — 63710000001 INSULIN REGULAR HUMAN PER 5 UNITS: Performed by: NURSE PRACTITIONER

## 2023-11-22 PROCEDURE — 25010000002 LACOSAMIDE 200 MG/20ML SOLUTION: Performed by: NURSE PRACTITIONER

## 2023-11-22 PROCEDURE — 83735 ASSAY OF MAGNESIUM: CPT | Performed by: INTERNAL MEDICINE

## 2023-11-22 PROCEDURE — 85025 COMPLETE CBC W/AUTO DIFF WBC: CPT | Performed by: INTERNAL MEDICINE

## 2023-11-22 PROCEDURE — C9254 INJECTION, LACOSAMIDE: HCPCS | Performed by: NURSE PRACTITIONER

## 2023-11-22 PROCEDURE — 80069 RENAL FUNCTION PANEL: CPT | Performed by: INTERNAL MEDICINE

## 2023-11-22 PROCEDURE — 25010000002 METOCLOPRAMIDE PER 10 MG: Performed by: INTERNAL MEDICINE

## 2023-11-22 PROCEDURE — 99024 POSTOP FOLLOW-UP VISIT: CPT | Performed by: THORACIC SURGERY (CARDIOTHORACIC VASCULAR SURGERY)

## 2023-11-22 PROCEDURE — 84484 ASSAY OF TROPONIN QUANT: CPT | Performed by: NURSE PRACTITIONER

## 2023-11-22 PROCEDURE — 99497 ADVNCD CARE PLAN 30 MIN: CPT | Performed by: NURSE PRACTITIONER

## 2023-11-22 PROCEDURE — 82948 REAGENT STRIP/BLOOD GLUCOSE: CPT

## 2023-11-22 RX ORDER — LACTULOSE 10 G/15ML
10 SOLUTION ORAL 3 TIMES DAILY
Status: DISCONTINUED | OUTPATIENT
Start: 2023-11-22 | End: 2023-12-09 | Stop reason: HOSPADM

## 2023-11-22 RX ORDER — METOCLOPRAMIDE HYDROCHLORIDE 5 MG/ML
7.5 INJECTION INTRAMUSCULAR; INTRAVENOUS EVERY 6 HOURS
Status: DISCONTINUED | OUTPATIENT
Start: 2023-11-22 | End: 2023-11-23

## 2023-11-22 RX ORDER — METOCLOPRAMIDE HYDROCHLORIDE 5 MG/ML
10 INJECTION INTRAMUSCULAR; INTRAVENOUS EVERY 6 HOURS
Status: DISCONTINUED | OUTPATIENT
Start: 2023-11-22 | End: 2023-11-22

## 2023-11-22 RX ORDER — CARVEDILOL 25 MG/1
25 TABLET ORAL EVERY 12 HOURS SCHEDULED
Status: DISCONTINUED | OUTPATIENT
Start: 2023-11-22 | End: 2023-12-09 | Stop reason: HOSPADM

## 2023-11-22 RX ORDER — ESCITALOPRAM OXALATE 10 MG/1
10 TABLET ORAL DAILY
Status: DISCONTINUED | OUTPATIENT
Start: 2023-11-22 | End: 2023-12-09 | Stop reason: HOSPADM

## 2023-11-22 RX ORDER — ASPIRIN 81 MG/1
81 TABLET, CHEWABLE ORAL DAILY
Status: DISCONTINUED | OUTPATIENT
Start: 2023-11-22 | End: 2023-12-09 | Stop reason: HOSPADM

## 2023-11-22 RX ADMIN — MUPIROCIN 1 APPLICATION: 20 OINTMENT TOPICAL at 16:29

## 2023-11-22 RX ADMIN — LACOSAMIDE 100 MG: 10 INJECTION INTRAVENOUS at 03:46

## 2023-11-22 RX ADMIN — ASPIRIN 81 MG: 81 TABLET, CHEWABLE ORAL at 08:21

## 2023-11-22 RX ADMIN — LACTULOSE 10 G: 10 SOLUTION ORAL at 16:29

## 2023-11-22 RX ADMIN — DEXTROSE MONOHYDRATE, SODIUM CHLORIDE, SODIUM LACTATE, CALCIUM CHLORIDE, MAGNESIUM CHLORIDE 2000 ML: 2.5; 538; 448; 18.4; 5.08 SOLUTION INTRAPERITONEAL at 11:32

## 2023-11-22 RX ADMIN — LACTULOSE 10 G: 10 SOLUTION ORAL at 09:38

## 2023-11-22 RX ADMIN — DIPHENHYDRAMINE HYDROCHLORIDE AND LIDOCAINE HYDROCHLORIDE AND ALUMINUM HYDROXIDE AND MAGNESIUM HYDRO 10 ML: KIT at 16:30

## 2023-11-22 RX ADMIN — DEXTROSE MONOHYDRATE, SODIUM CHLORIDE, SODIUM LACTATE, CALCIUM CHLORIDE, MAGNESIUM CHLORIDE 2000 ML: 2.5; 538; 448; 18.4; 5.08 SOLUTION INTRAPERITONEAL at 20:58

## 2023-11-22 RX ADMIN — METOCLOPRAMIDE 10 MG: 5 INJECTION, SOLUTION INTRAMUSCULAR; INTRAVENOUS at 13:20

## 2023-11-22 RX ADMIN — ESCITALOPRAM OXALATE 10 MG: 10 TABLET, FILM COATED ORAL at 08:21

## 2023-11-22 RX ADMIN — LACOSAMIDE 100 MG: 10 INJECTION INTRAVENOUS at 16:29

## 2023-11-22 RX ADMIN — DEXTROSE MONOHYDRATE, SODIUM CHLORIDE, SODIUM LACTATE, CALCIUM CHLORIDE, MAGNESIUM CHLORIDE 2000 ML: 2.5; 538; 448; 18.4; 5.08 SOLUTION INTRAPERITONEAL at 16:30

## 2023-11-22 RX ADMIN — 0.12% CHLORHEXIDINE GLUCONATE 15 ML: 1.2 RINSE ORAL at 08:20

## 2023-11-22 RX ADMIN — MUPIROCIN 1 APPLICATION: 20 OINTMENT TOPICAL at 20:57

## 2023-11-22 RX ADMIN — INSULIN HUMAN 4 UNITS: 100 INJECTION, SOLUTION PARENTERAL at 18:43

## 2023-11-22 RX ADMIN — LACTULOSE 10 G: 10 SOLUTION ORAL at 20:57

## 2023-11-22 RX ADMIN — CARVEDILOL 25 MG: 25 TABLET, FILM COATED ORAL at 20:57

## 2023-11-22 RX ADMIN — METOCLOPRAMIDE 7.5 MG: 5 INJECTION, SOLUTION INTRAMUSCULAR; INTRAVENOUS at 18:42

## 2023-11-22 RX ADMIN — DIPHENHYDRAMINE HYDROCHLORIDE AND LIDOCAINE HYDROCHLORIDE AND ALUMINUM HYDROXIDE AND MAGNESIUM HYDRO 10 ML: KIT at 08:41

## 2023-11-22 RX ADMIN — PANTOPRAZOLE SODIUM 40 MG: 40 INJECTION, POWDER, FOR SOLUTION INTRAVENOUS at 06:09

## 2023-11-22 RX ADMIN — DEXTROSE MONOHYDRATE, SODIUM CHLORIDE, SODIUM LACTATE, CALCIUM CHLORIDE, MAGNESIUM CHLORIDE 2000 ML: 2.5; 538; 448; 18.4; 5.08 SOLUTION INTRAPERITONEAL at 06:08

## 2023-11-22 RX ADMIN — CARVEDILOL 25 MG: 25 TABLET, FILM COATED ORAL at 08:23

## 2023-11-22 NOTE — PROGRESS NOTES
"[facility] f/u d/t depression. Chart reviewed. Psychiatry signed off yesterday d/t pt refusing consult. RN reports pt \"very flat and withdrawn, not engaging, refusing oral meds therefore given in IV form\". Pt sleeping and did not wake. RN discussed palliative consult d/t  pt's lack of involvement in treatment and medical decline.   [facility] following.   "

## 2023-11-22 NOTE — SIGNIFICANT NOTE
11/22/23 1413   OTHER   Discipline occupational therapist   Rehab Time/Intention   Session Not Performed patient/family declined, not feeling well  (pt refused therapy, too tired. groggy, not up for any therapy. 0252)

## 2023-11-22 NOTE — CONSULTS
.            [facility] Palliative Care Services    Palliative Care Initial Consult   Attending Physician: Bobby Carter*  Referring Provider: Dr Burrows    Reason for Referral: assistance with clarification of goals of care  Family/Support: mother, siblings and grandmother    Code Status and Medical Interventions:   Ordered at: 11/02/23 1358     Code Status (Patient has no pulse and is not breathing):    CPR (Attempt to Resuscitate)     Medical Interventions (Patient has pulse or is breathing):    Full Support     Goals of Care: To recover from hospitalization and return to home setting.     HPI:   33 y.o. female with history of lupus nephritis status post renal biopsy managed with mycophenolate mofetil and prednisone unable to tolerate switch to cyclophosphamide IV unfortunately with worsening renal decline and progression to end-stage renal disease initially requiring hemodialysis through a right tunneled hemodialysis catheter and later switched to peritoneal dialysis, PRESS syndrome secondary to uncontrolled hypertension from lack of compliance, chronic anemia, hyperparathyroidism, migraines and vitamin D deficiency. She resided at home prior to admission.   Patient presented to [facility] on 10/26/2023 related to shortness of breath with exertion and relieved at rest, general malaise and poor appetite. Workup in ER, revealed hyponatremia (122) and elevated pro-BNP (greater than 70,000) with no signs of pulmonary edema or volume overload on her chest xray.  Consults were placed for nephrology (ESRD with PD) and cardiology (shortness of breath and elevated proBNP). She went for an ECHO on 10/27/2023 that revealed mild to moderate MR and severe aortic valve regurgitation with large mobile echodensity on the right coronary leaflet. As a result she went for a CHETNA on 10/30/2023 that revealed aortic root/ascending aneurysm with dissection resulting in aortic regurgitation.  CTA of the chest showed ascending aortic aneurysm/localized dissection. She was then evaluated by cardiothoracic. She went for  ascending aortic aneurysm dissection repair with proximal replacement on 11/2/2023. Postoperatively, she had seizure like activity and was evaluated by neurology and neurosurgery.  She had imaging of brain that revealed a thin acute right posterior parafalcine and tentorial subdural hematoma as well as a thin right subdural hygroma. Her hospitalization has been complicated with cardiac tamponade secondary to pericardial thrombus status post reexploration and clot removal 11/6. In addition, malnutrition and adjustment disorder with mixed emotional features. There was discussion regarding PEG placement,however patient refused. Access center has attempted to support patient however, she refused. The palliative care team was consult for support with goals of care conversation due to chronic illnesses.   Palliative Care Spoke With: patient  Quality of life: fair  Functional Status: Pt performed bed mobility with min assist, STS x2 with min/mod assist, and ambulated with min hha. Pt able to perform STS from EOB with min assist, but required mod assist from toilet. Slower and slightly more unsteady this date with few minor LOB observed req min assist to correct. Per PT notes on 11/21/2023  Due to the Palliative Care Topics Discussed: palliative care, goals of care, care options, resuscitation status, and discharge options we will establish an advance care plan.   Advance Care Planning   Advance Care Planning Discussion: see below         Review of Systems   Constitutional: Positive for decreased appetite and malaise/fatigue.   Cardiovascular:  Negative for chest pain.   Respiratory:  Negative for shortness of breath.    Gastrointestinal:  Positive for nausea and vomiting. Negative for abdominal pain.   Neurological:  Positive for weakness.   Psychiatric/Behavioral:  Negative for depression. The  patient is not nervous/anxious.      1- Pain Assessment  Preferred Pain Scale: number (Numeric Rating Pain Scale)  Nonverbal Indicators of Pain: nonverbal indicators absent  CPOT Facial Expression: 0-->relaxed, neutral  CPOT Body Movements: 0-->absence of movements  CPOT Muscle Tension: 0-->relaxed  Ventilator Compliance/Vocalization: 0-->talking in normal tone or no sound  CPOT Score: 0  Pain Location: abdomen  Pain Description: cramping    Past Medical History:   Diagnosis Date    Anasarca     PER CT SCAN    Dry skin     ESRD (end stage renal disease) on dialysis     TUES, THURS, SAT UMAIR CHAVIRA HWDRAKE    History of abdominal pain     History of anemia     History of transfusion     Hypertension     Iron deficiency anemia 09/27/2021    Lupus (systemic lupus erythematosus) 07/30/2022    Migraine     Other specified nutritional anemias     Pericardial effusion     Renal insufficiency     Seizures     STATES LAST WAS 1/2023    Shortness of breath     OCCASIONAL    Vitamin D deficiency 09/27/2021     Past Surgical History:   Procedure Laterality Date    ASCENDING AORTIC ANEURYSM REPAIR W/ MECHANICAL AORTIC VALVE REPLACEMENT N/A 11/2/2023    Procedure: CHETNA STERNOTOMY, AORTIC ROOT REPLACEMENT WITH VALVE SPARING MISHEL PROCEDURE, REPLACEMENT OF ASCENDING AORTA, RIGHT FEMORAL DIALYSIS CATHETER PLACEMENT AND PRP;  Surgeon: Chris Medina MD;  Location: [facility] CVOR;  Service: Cardiothoracic;  Laterality: N/A;    CARDIAC CATHETERIZATION N/A 06/14/2023    Procedure: Coronary angiography;  Surgeon: Juana Taylor MD;  Location: [facility] CATH INVASIVE LOCATION;  Service: Cardiovascular;  Laterality: N/A;    CARDIAC CATHETERIZATION N/A 06/14/2023    Procedure: Left heart cath;  Surgeon: Juana Taylor MD;  [facility]AS[facility];  Service: Cardiovascular;  Laterality: N/A;    CARDIAC CATHETERIZATION N/A 06/14/2023    Procedure: Right Heart Cath;  Surgeon: Juana Taylor MD;  Location: [facility] CATH INVASIVE  LOCATION;  Service: Cardiovascular;  Laterality: N/A;    COLONOSCOPY N/A 7/20/2023    Procedure: COLONOSCOPY to cecum with biopsy;  Surgeon: Drew Kaminski MD;  Location: [facility] ENDOSCOPY;  Service: Gastroenterology;  Laterality: N/A;  PRE - diarrhea, constipation  POST - fair prep, normal    CORONARY ARTERY BYPASS GRAFT N/A 11/6/2023    Procedure: STERNAL EXPLORATION AND WASH OUT;  Surgeon: Jr Mitesh Quiroz MD;  Location: [facility] CVOR;  Service: Cardiothoracic;  Laterality: N/A;    ENDOSCOPY N/A 7/20/2023    Procedure: ESOPHAGOGASTRODUODENOSCOPY with biopsy;  Surgeon: Drew Kaminski MD;  Location: [facility] ENDOSCOPY;  Service: Gastroenterology;  Laterality: N/A;  PRE - abn ct abd  POST - gastritis    INSERTION HEMODIALYSIS CATHETER N/A 07/26/2022    Procedure: RIGHT TUNNELED DIALYSIS CATHETER PLACEMENT;  Surgeon: Diandra Adhikari MD;  Location: [facility] MAIN OR;  Service: Vascular;  Laterality: N/A;    INSERTION PERITONEAL DIALYSIS CATHETER N/A 04/03/2023    Procedure: INSERTION PERITONEAL DIALYSIS CATHETER LAPAROSCOPIC, omentumpexy;  Surgeon: Jemal Loyola MD;  Location: [facility] MAIN OR;  Service: General;  Laterality: N/A;    TONSILLECTOMY       Social History     Socioeconomic History    Marital status: Single   Tobacco Use    Smoking status: Former     Types: Cigars     Passive exposure: Past    Smokeless tobacco: Never    Tobacco comments:     Patient smoked black & mild   Vaping Use    Vaping Use: Never used   Substance and Sexual Activity    Alcohol use: Yes     Comment: social    Drug use: Yes     Types: Marijuana     Comment: OCCASIONAL    Sexual activity: Not Currently     Partners: Male     Birth control/protection: None       Current Facility-Administered Medications   Medication Dose Route Frequency Provider Last Rate Last Admin    acetaminophen (TYLENOL) tablet 650 mg  650 mg Oral Q4H PRN Shea Monteiro APRN   650 mg at 11/21/23 1451    Or    acetaminophen (TYLENOL) 160 MG/5ML oral  solution 650 mg  650 mg Oral Q4H PRN Shea Monteiro APRN   650 mg at 11/20/23 1937    Or    acetaminophen (TYLENOL) suppository 650 mg  650 mg Rectal Q4H PRN Shea Monteiro APRN        aspirin chewable tablet 81 mg  81 mg Nasogastric Daily Bobby Carter MD   81 mg at 11/22/23 0821    bisacodyl (DULCOLAX) EC tablet 10 mg  10 mg Oral Daily PRN Shea Monteiro APRN   10 mg at 11/08/23 2052    bisacodyl (DULCOLAX) suppository 10 mg  10 mg Rectal Daily PRN Shea Monteiro APRN        carvedilol (COREG) tablet 25 mg  25 mg Nasogastric Q12H Bobby aCrter MD   25 mg at 11/22/23 0823    chlorhexidine (PERIDEX) 0.12 % solution 15 mL  15 mL Mouth/Throat Q12H Shea Monteiro APRN   15 mL at 11/22/23 0820    Delflex-LC/2.5% Dextrose (DIANEAL) CAPD 2,000 mL  2,000 mL Intraperitoneal 5 Exchanges Daily - Dwell Overnight Lance Martínez MD   2,000 mL at 11/22/23 0608    dextrose (D50W) (25 g/50 mL) IV injection 25 g  25 g Intravenous Q15 Min PRN Delia Stern APRN   25 g at 11/08/23 0020    dextrose (GLUTOSE) oral gel 15 g  15 g Oral Q15 Min PRN Delia Stern APRN        escitalopram (LEXAPRO) tablet 10 mg  10 mg Nasogastric Daily Bobby Carter MD   10 mg at 11/22/23 0821    First Mouthwash (Magic Mouthwash) 10 mL  10 mL Swish & Spit Q6H Delia Stern APRN   10 mL at 11/22/23 0841    glucagon (GLUCAGEN) injection 1 mg  1 mg Intramuscular Q15 Min PRN Delia Stern APRN        heparin (porcine) injection 3,000 Units  3,000 Units Intracatheter PRN Shea Monteiro APRN   3,000 Units at 11/13/23 1255    Hold medication  1 each Does not apply Continuous PRN Delia Stern APRN        hydrALAZINE (APRESOLINE) injection 20 mg  20 mg Intravenous Q4H PRN Shea Monteiro APRN   20 mg at 11/21/23 2056    insulin regular (humuLIN R,novoLIN R) injection 2-7 Units  2-7 Units Subcutaneous Q6H Delia Stern APRN   3 Units at 11/18/23 9176    ipratropium-albuterol (DUO-NEB)  nebulizer solution 3 mL  3 mL Nebulization Q4H PRN Shea Monteiro APRN   3 mL at 23 0711    lacosamide (VIMPAT) injection 100 mg  100 mg Intravenous Q12H Roseline Jordan APRN   100 mg at 23 0346    lactulose (CHRONULAC) 10 GM/15ML solution 10 g  10 g Nasogastric TID Regi Hyde MD   10 g at 23 0938    lidocaine (XYLOCAINE) 1 % injection 10 mL  10 mL Subcutaneous Once Payam Phillips MD        mupirocin (BACTROBAN) 2 % nasal ointment   Each Nare BID Shea Monteiro APRN   1 application  at 23 0823    naloxone (NARCAN) injection 0.4 mg  0.4 mg Intravenous Q5 Min PRN Shea Monteiro APRN        nitroglycerin (NITROSTAT) SL tablet 0.4 mg  0.4 mg Sublingual Q5 Min PRN Shea Monteiro APRN   0.4 mg at 23 1714    ondansetron (ZOFRAN) injection 4 mg  4 mg Intravenous Q6H PRN Shea Monteiro APRN   4 mg at 23 1937    pantoprazole (PROTONIX) injection 40 mg  40 mg Intravenous Q AM Delia Stern APRN   40 mg at 23 0609    polyethylene glycol (MIRALAX) packet 17 g  17 g Oral Daily PRN Shea Monteiro APRN         hold, 1 each        acetaminophen **OR** acetaminophen **OR** acetaminophen    bisacodyl    bisacodyl    dextrose    dextrose    glucagon (human recombinant)    heparin (porcine)    hold    hydrALAZINE    ipratropium-albuterol    [] Morphine **AND** naloxone    nitroglycerin    ondansetron    polyethylene glycol    Allergies   Allergen Reactions    Minoxidil Hives and Other (See Comments)     Pericardial effusion .     Attest that current medications reviewed  including but not limited to prescriptions, over-the counter, herbals and vitamin/mineral/dietary (nutritional) supplements for name, route of administration, type, dose and frequency and are current using all immediate resources available at time of dictation.      Intake/Output Summary (Last 24 hours) at 2023 0939  Last data filed at 2023 0843  Gross per 24 hour   Intake 1939  "ml   Output 4200 ml   Net 405 ml       Physical Exam:    Diagnostics: Reviewed  /92 (BP Location: Right arm, Patient Position: Lying)   Pulse 109   Temp 98.8 °F (37.1 °C) (Oral)   Resp 16   Ht 165 cm (64.96\")   Wt 48.8 kg (107 lb 9.4 oz)   LMP 08/10/2022 (Approximate)   SpO2 100%   BMI 17.92 kg/m²     Constitutional:       Appearance: Frail. Chronically ill-appearing.   Pulmonary:      Effort: Pulmonary effort is normal.      Breath sounds: Normal breath sounds.   Chest:      Comments: Mid sternal wound  Cardiovascular:      PMI at left midclavicular line. Normal rate. Regular rhythm.   Edema:     Peripheral edema absent.   Neurological:      Mental Status: Alert and oriented to person, place, and time.   Psychiatric:         Attention and Perception: Attention normal.         Mood and Affect: Affect is flat.         Speech: Speech normal.         Behavior: Behavior is cooperative.         Thought Content: Thought content normal.         Cognition and Memory: Cognition normal.         Judgment: Judgment normal.      Comments: Soft spoken   Patient status: Disease state: Controlled with current treatments.  Functional status: Palliative Performance Scale Score: Performance 60% based on the following measures: Ambulation: Reduced, Activity and Evidence of Disease: Unable to do hobby or some work, significant evidence of disease, Self-Care: Occasional assist necessary,  Intake: Normal or reduced, LOC: Full or confusion   Nutritional status: Albumin 2.4 on 11/22/2023 Body mass index is 17.92 kg/m².    Family support: The patient receives support from her mother and extended family..  Advance Directives: Advance Directive Status: Patient has advance directive, copy in chart   POA/Healthcare surrogate-mother (Joselin ) and sister (Radha).       Impression/Problem List:     Dissecting ascending aortic aneurysm s/p repair   Acute CVA   ESRD on PD    Cardiac tamponade  Seizure disorder  Malnutrition   SLE  Anemia " of chronic kidney disease      Recommendations/Plan:  1. Provide support with goals of care      2.  Palliative care encounter  I spoke with with patient regarding goals of care conversation. Of note, it was difficult to get her to talk,however she was agreeable. She does have a living will on file that designates her mother then sister as healthcare surrogates. She resides at home. She has the support of her mother, grandmother, and siblings. She has fair understanding of her medical conditions, which we reviewed her hospital course in detail. She also shared with me that she had just recently started peritoneal dialysis prior to hospitalization. She reports being independent prior to hospitalization. She expresses frustration with prolonged hospitalization and not feeling well and weak and wanting to get better. We discussed the things she has to do to have some improvement like eating, participating in therapy. We also revisited her decision regarding PEG placement. She is made aware that coretrak is NOT long term treatment and if she doesn't improve her oral intake she will need to reconsider PEG placement if her goal is to improve. We also discussed palliative care, Pallitus, hosparus and comfort care and she isn't interested in them. She is aware of guarded/poor prognosis as well. We also discussed CODE status and medical interventions and she wishes to be FULL code.  I did ask about depression and anxiety and she denies. I offered access center support and she refused. We also discussed the importance of family and their presence while inpatient. She doesn't feel like it makes much difference. Of note, her RN said family visited last night and she seemed to do better afterwards At the end of conversation, I reiterated the importance of her nutrition and involvement in her care (PT/OT), etc to get better so that she return to home setting if that is her wishes and that if she doesn't then she will have  increased for complications and progressive deterioration.   I will plan to follow up on Monday and see how she does over the next few days. I spoke with SULMA Valdivia      Thank you for this consult and allowing us to participate in patient's plan of care. Palliative Care Team will continue to follow patient.     Time spent:45 minutes spent reviewing medical and medication records, assessing and examining patient, discussing with family, answering questions, providing some guidance about a plan and documentation of care, and coordinating care with other healthcare members, with > 50% time spent face to face.   30  minutes spent on advance care planning.    Narcisa Lara, CAESAR  11/22/2023  09:39 EST

## 2023-11-22 NOTE — PROGRESS NOTES
" LOS: 27 days     Name: Dee Herrera  Age: 33 y.o.  Sex: female  :  1990  MRN: [id number]         Primary Care Physician: Margarita Woods APRN    Subjective   Subjective  Vomiting this morning.  Tube feeds have been turned off for now.  Patient remains very withdrawn.  Last bowel movement 2 days ago.  He not eating or drinking anything.    Objective   Vital Signs  Temp:  [98.1 °F (36.7 °C)-99.9 °F (37.7 °C)] 98.8 °F (37.1 °C)  Heart Rate:  [] 109  Resp:  [14-16] 16  BP: (139-156)/() 139/92  Body mass index is 17.92 kg/m².    Objective:  General Appearance:  Comfortable and in no acute distress (Chronically ill-appearing).    Vital signs: (most recent): Blood pressure 139/92, pulse 109, temperature 98.8 °F (37.1 °C), temperature source Oral, resp. rate 16, height 165 cm (64.96\"), weight 48.8 kg (107 lb 9.4 oz), last menstrual period 08/10/2022, SpO2 100%, not currently breastfeeding.    Lungs:  Normal effort and normal respiratory rate.    Heart: Normal rate.  Regular rhythm.    Abdomen: Abdomen is soft.  Bowel sounds are normal.   There is no abdominal tenderness.     Extremities: There is no dependent edema or local swelling.    Neurological: (Very withdrawn.  Could not get her to answer any questions).    Skin:  Warm and dry.                Results Review:       I reviewed the patient's new clinical results.    Results from last 7 days   Lab Units 23  0550 23  0713 23  0317 23  0308 23  0330 23  0243 23  0242   WBC 10*3/mm3 12.10* 7.95 8.30 9.43 10.96* 13.12* 11.48*   HEMOGLOBIN g/dL 10.1* 9.9* 9.1* 9.5* 9.1* 8.5* 8.9*   PLATELETS 10*3/mm3 235 210 201 180 172 154 136*     Results from last 7 days   Lab Units 23  0550 23  1718 23  0401 23  0317 23  0308 23  0330 23  0243 23  0242   SODIUM mmol/L 127*  --  128* 130* 128* 130* 130* 130*   POTASSIUM mmol/L 4.0 3.6 3.7 4.0 3.9 3.1* 3.2* 3.0*   CHLORIDE " Orders placed   mmol/L 88*  --  90* 91* 92* 94* 94* 96*   CO2 mmol/L 27.5  --  25.1 24.9 25.1 26.0 24.6 24.9   BUN mg/dL 43*  --  35* 32* 27* 29* 31* 28*   CREATININE mg/dL 4.88*  --  5.37* 5.01* 4.50* 4.64* 4.60* 4.52*   CALCIUM mg/dL 8.8  --  8.6 8.4* 8.2* 8.6 8.1* 8.4*   GLUCOSE mg/dL 112*  --  120* 94 107* 158* 189* 130*                 Scheduled Meds:   aspirin, 81 mg, Nasogastric, Daily  carvedilol, 25 mg, Nasogastric, Q12H  chlorhexidine, 15 mL, Mouth/Throat, Q12H  Delflex-LC/2.5% Dextrose, 2,000 mL, Intraperitoneal, 5 Exchanges Daily - Dwell Overnight  escitalopram, 10 mg, Nasogastric, Daily  First Mouthwash (Magic Mouthwash), 10 mL, Swish & Spit, Q6H  insulin regular, 2-7 Units, Subcutaneous, Q6H  Lacosamide, 100 mg, Intravenous, Q12H  lactulose, 10 g, Nasogastric, TID  lidocaine, 10 mL, Subcutaneous, Once  metoclopramide, 10 mg, Intravenous, Q6H  mupirocin, , Each Nare, BID  pantoprazole, 40 mg, Intravenous, Q AM      PRN Meds:     acetaminophen **OR** acetaminophen **OR** acetaminophen    bisacodyl    bisacodyl    dextrose    dextrose    glucagon (human recombinant)    heparin (porcine)    hold    hydrALAZINE    ipratropium-albuterol    [] Morphine **AND** naloxone    nitroglycerin    ondansetron    polyethylene glycol  Continuous Infusions:  hold, 1 each        Assessment & Plan   Active Hospital Problems    Diagnosis  POA    **Dissecting ascending aortic aneurysm [I71.010]  Yes    Recent cerebrovascular accident (CVA) [Z86.73]  Yes    Aortic valve lesion [I35.8]  Yes    Severe aortic valve regurgitation [I35.1]  Yes    Hyponatremia [E87.1]  Yes    Poor appetite [R63.0]  Yes    Moderate malnutrition [E44.0]  Yes    Chronic diastolic CHF (congestive heart failure) [I50.32]  Yes    Anemia due to chronic kidney disease, on chronic dialysis [N18.6, D63.1, Z99.2]  Not Applicable    Peritoneal dialysis catheter in place [Z99.2]  Not Applicable    Essential hypertension [I10]  Yes    End stage renal disease on dialysis  [N18.6, Z99.2]  Not Applicable    Seizure disorder [G40.909]  Yes    Elevated liver function tests [R79.89]  Yes    Pericardial effusion [I31.39]  Yes    Systemic lupus erythematosus [M32.9]  Yes    Thrombocytopenia [D69.6]  Yes    Hypertension secondary to other renal disorders [I15.1]  Yes    Pancytopenia [D61.818]  Yes    Vitamin D deficiency [E55.9]  Yes      Resolved Hospital Problems    Diagnosis Date Resolved POA    Abdominal pain [R10.9] 10/28/2023 Yes       Assessment & Plan    -Oral intake and nutrition status remained poor.  She has been switched over to continuous tube feeds  -KUBs both yesterday and today unremarkable.  She has been started on lactulose.  Will also add Reglan.  Directed nursing staff to keep her tube feeds on hold for 6 hours and then restart if she is not having any vomiting.  -Neurology has provided recommendations regarding her acute CVA.  Currently on low-dose aspirin.  On Vimpat for seizures.  -Keppra discontinued given possible effects on her mood.  Remains on Lexapro.  -She remains very withdrawn.  Psychiatry did not have any specific recommendations.  -Continues on peritoneal dialysis as per nephrology.   -CellCept and Plaquenil for her history of SLE is currently on hold.  -Blood sugars well controlled.  Continue low-dose sliding scale insulin  -Continue therapy services     Dispo  To be determined     Discussed with patient's mother on 11/21.  Her prognosis seems very guarded at this time      Expected Discharge Date: 11/24/2023; Expected Discharge Time:      Bobby Carter MD  [facility]  11/22/23  11:56 EST

## 2023-11-22 NOTE — PROGRESS NOTES
" LOS: 27 days   Patient Care Team:  Margarita Woods APRN as PCP - General (Nurse Practitioner)  Winnie Sanchez MD as Referring Physician (Obstetrics and Gynecology)  Norberto Almaraz MD PhD as Consulting Physician (Hematology and Oncology)  Lupis Thomas MD as Consulting Physician (Nephrology)  Hair Mckeon MD as Consulting Physician (Nephrology)  Juana Taylor MD as Consulting Physician (Cardiology)    Chief Complaint: post op    Subjective:  Symptoms:  Stable.  No shortness of breath, cough or chest pain.    Diet:  Poor intake.  She reports  nausea and vomiting.    Activity level: Impaired due to weakness.    Pain:  She reports no pain.      Vital Signs  Temp:  [98.1 °F (36.7 °C)-99.9 °F (37.7 °C)] 98.8 °F (37.1 °C)  Heart Rate:  [] 109  Resp:  [16] 16  BP: (130-156)/() 130/89  Body mass index is 17.92 kg/m².    Intake/Output Summary (Last 24 hours) at 11/22/2023 1335  Last data filed at 11/22/2023 1252  Gross per 24 hour   Intake 6605 ml   Output 7200 ml   Net -595 ml     I/O this shift:  In: 2240 [P.O.:240]  Out: 4800           11/20/23  0417 11/21/23  0500 11/22/23  0432   Weight: 54.3 kg (119 lb 9.6 oz) 52.6 kg (115 lb 14.4 oz) 48.8 kg (107 lb 9.4 oz)     Objective:  General Appearance:  Comfortable and in no acute distress.    Vital signs: (most recent): Blood pressure 130/89, pulse 109, temperature 98.8 °F (37.1 °C), temperature source Oral, resp. rate 16, height 165 cm (64.96\"), weight 48.8 kg (107 lb 9.4 oz), last menstrual period 08/10/2022, SpO2 100%, not currently breastfeeding.  Vital signs are normal.  No fever.    Output: No urine output and producing stool.    Lungs:  Normal effort and normal respiratory rate.  There are decreased breath sounds.    Heart: Normal rate.  Regular rhythm.  (SR on tele monitor)  Abdomen: Abdomen is soft and non-distended.  Bowel sounds are normal.     Pulses: Distal pulses are intact.    Neurological: Patient is oriented to person, " "place and time.  (drowsy).    Skin:  Warm and dry.  (Sternal dressing clean, dry, and intact)          Results Review:        WBC WBC   Date Value Ref Range Status   11/22/2023 12.10 (H) 3.40 - 10.80 10*3/mm3 Final   11/21/2023 7.95 3.40 - 10.80 10*3/mm3 Final   11/20/2023 8.30 3.40 - 10.80 10*3/mm3 Final      HGB Hemoglobin   Date Value Ref Range Status   11/22/2023 10.1 (L) 12.0 - 15.9 g/dL Final   11/21/2023 9.9 (L) 12.0 - 15.9 g/dL Final   11/20/2023 9.1 (L) 12.0 - 15.9 g/dL Final      HCT Hematocrit   Date Value Ref Range Status   11/22/2023 30.1 (L) 34.0 - 46.6 % Final   11/21/2023 30.3 (L) 34.0 - 46.6 % Final   11/20/2023 28.2 (L) 34.0 - 46.6 % Final      Platelets Platelets   Date Value Ref Range Status   11/22/2023 235 140 - 450 10*3/mm3 Final   11/21/2023 210 140 - 450 10*3/mm3 Final   11/20/2023 201 140 - 450 10*3/mm3 Final        PT/INR:  No results found for: \"PROTIME\"/No results found for: \"INR\"    Sodium Sodium   Date Value Ref Range Status   11/22/2023 127 (L) 136 - 145 mmol/L Final   11/21/2023 128 (L) 136 - 145 mmol/L Final   11/20/2023 130 (L) 136 - 145 mmol/L Final      Potassium Potassium   Date Value Ref Range Status   11/22/2023 4.0 3.5 - 5.2 mmol/L Final     Comment:     Slight hemolysis detected by analyzer. Result may be falsely elevated.   11/21/2023 3.6 3.5 - 5.2 mmol/L Final   11/21/2023 3.7 3.5 - 5.2 mmol/L Final     Comment:     Slight hemolysis detected by analyzer. Result may be falsely elevated.   11/20/2023 4.0 3.5 - 5.2 mmol/L Final      Chloride Chloride   Date Value Ref Range Status   11/22/2023 88 (L) 98 - 107 mmol/L Final   11/21/2023 90 (L) 98 - 107 mmol/L Final   11/20/2023 91 (L) 98 - 107 mmol/L Final      Bicarbonate CO2   Date Value Ref Range Status   11/22/2023 27.5 22.0 - 29.0 mmol/L Final   11/21/2023 25.1 22.0 - 29.0 mmol/L Final   11/20/2023 24.9 22.0 - 29.0 mmol/L Final      BUN BUN   Date Value Ref Range Status   11/22/2023 43 (H) 6 - 20 mg/dL Final   11/21/2023 35 " (H) 6 - 20 mg/dL Final   11/20/2023 32 (H) 6 - 20 mg/dL Final      Creatinine Creatinine   Date Value Ref Range Status   11/22/2023 4.88 (H) 0.57 - 1.00 mg/dL Final   11/21/2023 5.37 (H) 0.57 - 1.00 mg/dL Final   11/20/2023 5.01 (H) 0.57 - 1.00 mg/dL Final      Calcium Calcium   Date Value Ref Range Status   11/22/2023 8.8 8.6 - 10.5 mg/dL Final   11/21/2023 8.6 8.6 - 10.5 mg/dL Final   11/20/2023 8.4 (L) 8.6 - 10.5 mg/dL Final      Magnesium Magnesium   Date Value Ref Range Status   11/22/2023 2.2 1.6 - 2.6 mg/dL Final   11/21/2023 1.5 (L) 1.6 - 2.6 mg/dL Final   11/20/2023 1.7 1.6 - 2.6 mg/dL Final          aspirin, 81 mg, Nasogastric, Daily  carvedilol, 25 mg, Nasogastric, Q12H  chlorhexidine, 15 mL, Mouth/Throat, Q12H  Delflex-LC/2.5% Dextrose, 2,000 mL, Intraperitoneal, 5 Exchanges Daily - Dwell Overnight  escitalopram, 10 mg, Nasogastric, Daily  First Mouthwash (Magic Mouthwash), 10 mL, Swish & Spit, Q6H  insulin regular, 2-7 Units, Subcutaneous, Q6H  Lacosamide, 100 mg, Intravenous, Q12H  lactulose, 10 g, Nasogastric, TID  lidocaine, 10 mL, Subcutaneous, Once  metoclopramide, 10 mg, Intravenous, Q6H  mupirocin, , Each Nare, BID  pantoprazole, 40 mg, Intravenous, Q AM      hold, 1 each      Assessment & Plan  - Ascending aortic aneurysm with dissection- s/p ascending aortic dissection repair/proximal replacement, gacron interposition graft, kelli procedure; insertion of right femoral shiley---POD#20; Pagni  - Cardiac tamponade- reexploration for bleeding, removal of large clot compressing the RV---POD#16; Camporrotondo  - severe aortic valve insufficiency  - Encephalopathy, improving   - ESRD on PD  - Lupus--on Cellcept/plaquenil; currently held  - Epilepsy  - chronic immunosuppression  - hypertension  - chronic anemia  - TCP--chronic; improving  - Depression--started on lexapro 11/14  - right MCA CVA--continue ASA per neuro     Remains withdrawn. Poor oral intake. With vomiting this morning. Reglan has been  ordered with plans to start continuous TF later today  General surgery has been consulted for PEG placement, although patient is refusing at this time  Remains in NSR. BP elevated overnight, but better after medications this morning  On PD--managed per nephrology   She is very weak. Continue aggressive PT/OT  Continue supportive care    Katherine Caal, CAESAR  11/22/23  13:35 EST

## 2023-11-22 NOTE — PROGRESS NOTES
"    [facility] Progress Note      Patient Name: Dee Herrera  : 1990  MRN: [id number]  Primary Care Physician:  Margariat Woods, CAESAR  Date of admission: 10/26/2023    Subjective     Interval History:   Follow-up ESRD.  PD fluid not filling, draining slowly.  No BM for several days.  Refusing all p.o. meds.  I did specifically ask if we could give them through the core track and she said \"yes.\"     Review of Systems:   As noted above    Objective     Vitals:   Temp:  [98.1 °F (36.7 °C)-99.9 °F (37.7 °C)] 98.8 °F (37.1 °C)  Heart Rate:  [] 109  Resp:  [14-16] 16  BP: (139-156)/() 139/92    Intake/Output Summary (Last 24 hours) at 2023 0810  Last data filed at 2023 0717  Gross per 24 hour   Intake 4395 ml   Output 4200 ml   Net 195 ml       Physical Exam:    General Appearance: Very withdrawn.  Her eyes are at least open today during conversation.    Voice barely a whisper.  Skin: warm and dry  HEENT: oral mucosa dry.  Core track in place.  Nonicteric sclera  Neck: supple, no JVD  Lungs: clear to auscultation bilaterally.  Heart: RRR, normal S1 and S2  Abdomen: soft, nontender, nondistended.  PD catheter exit site clean.  Extremities: no edema.  Neuro: Withdrawn.  Barely speaks.    Scheduled Meds:     aspirin, 81 mg, Nasogastric, Daily  carvedilol, 25 mg, Nasogastric, Q12H  chlorhexidine, 15 mL, Mouth/Throat, Q12H  Delflex-LC/2.5% Dextrose, 2,000 mL, Intraperitoneal, 5 Exchanges Daily - Dwell Overnight  escitalopram, 10 mg, Nasogastric, Daily  First Mouthwash (Magic Mouthwash), 10 mL, Swish & Spit, Q6H  insulin regular, 2-7 Units, Subcutaneous, Q6H  Lacosamide, 100 mg, Intravenous, Q12H  lactulose, 10 g, Nasogastric, TID  lidocaine, 10 mL, Subcutaneous, Once  mupirocin, , Each Nare, BID  pantoprazole, 40 mg, Intravenous, Q AM      IV Meds:   hold, 1 each        Results Reviewed:   I have personally reviewed the results from the time of this admission to " 11/22/2023 08:10 EST     Results from last 7 days   Lab Units 11/22/23  0550 11/21/23  1718 11/21/23  0401 11/20/23 0317 11/19/23  0308 11/18/23  0330   SODIUM mmol/L 127*  --  128* 130*   < > 130*   POTASSIUM mmol/L 4.0 3.6 3.7 4.0   < > 3.1*   CHLORIDE mmol/L 88*  --  90* 91*   < > 94*   CO2 mmol/L 27.5  --  25.1 24.9   < > 26.0   BUN mg/dL 43*  --  35* 32*   < > 29*   CREATININE mg/dL 4.88*  --  5.37* 5.01*   < > 4.64*   CALCIUM mg/dL 8.8  --  8.6 8.4*   < > 8.6   BILIRUBIN mg/dL  --   --   --   --   --  0.2   ALK PHOS U/L  --   --   --   --   --  74   ALT (SGPT) U/L  --   --   --   --   --  21   AST (SGOT) U/L  --   --   --   --   --  34*   GLUCOSE mg/dL 112*  --  120* 94   < > 158*    < > = values in this interval not displayed.       Estimated Creatinine Clearance: 12.9 mL/min (A) (by C-G formula based on SCr of 4.88 mg/dL (H)).    Results from last 7 days   Lab Units 11/22/23  0550 11/21/23 1718 11/21/23 0401 11/20/23 0317   MAGNESIUM mg/dL 2.2 1.5*  --  1.7   PHOSPHORUS mg/dL 3.7  --  5.0* 4.6*             Results from last 7 days   Lab Units 11/22/23  0550 11/21/23  0713 11/20/23 0317 11/19/23 0308 11/18/23  0330   WBC 10*3/mm3 12.10* 7.95 8.30 9.43 10.96*   HEMOGLOBIN g/dL 10.1* 9.9* 9.1* 9.5* 9.1*   PLATELETS 10*3/mm3 235 210 201 180 172             Assessment / Plan     ASSESSMENT:  ESRD.  Currently on peritoneal dialysis.  Her clearance appears to be adequate however, she has poor muscle mass so her creatinine may actually reflect inadequate dialysis.  Refuses PEG tube for nutrition.  Plan was originally to switch to hemodialysis temporarily until she can get nutritionally replete.  ED not filling or draining well.  Suspect due to constipation.  Will check KUB and add lactulose per core track.  2.  Acute CVA cortical infarct in the anterior inferior medial right frontal lobe.  Subacute subdural hematoma right occipital.  3.  Ascending aortic aneurysm with subacute/chronic aortic root dissection.   Status post repair of proximal aortic aneurysm 11/2/2023.  Re- exploration for cardiac tamponade 11/6/2023.  4.  Seizure disorder currently on Vimpat  5.  Severe protein calorie malnutrition now on tube feeds.  Refuses PEG.  6.  SLE.  Currently off immunosuppression.  C3 mildly low, C4 normal.  Anti-double-stranded DNA antibody sent 11/20/2023.  7.  Anemia CKD.  PLAN:  KUB this morning  Lactulose 3 times daily per core track  Change all meds p.o. to core track route.  Pharmacist assisting.  4.  Prognosis very guarded.  Thank you for involving us in the care of Dee Herrera.  Please feel free to call with any questions.    Regi Hyde MD  11/22/23  08:10 [facility]    [facility]  [phone]    Please note that portions of this note were completed with a voice recognition program.

## 2023-11-22 NOTE — PLAN OF CARE
Goal Outcome Evaluation:  Plan of Care Reviewed With: patient        Progress: declining  Patient slept all shift. Remains withdrawn and flat. PD done without complication. IV antibiotics given. Remains on RA. SR on monitor. Sternal incision C/D/I. Tfs continue. Cortrak in place. Patient continues to refuse oral medication. Palliative consult placed. All needs met.

## 2023-11-23 LAB
ALBUMIN SERPL-MCNC: 2.2 G/DL (ref 3.5–5.2)
ANION GAP SERPL CALCULATED.3IONS-SCNC: 10.9 MMOL/L (ref 5–15)
BASOPHILS # BLD AUTO: 0.01 10*3/MM3 (ref 0–0.2)
BASOPHILS NFR BLD AUTO: 0.1 % (ref 0–1.5)
BUN SERPL-MCNC: 40 MG/DL (ref 6–20)
BUN/CREAT SERPL: 8 (ref 7–25)
CALCIUM SPEC-SCNC: 8.5 MG/DL (ref 8.6–10.5)
CHLORIDE SERPL-SCNC: 88 MMOL/L (ref 98–107)
CO2 SERPL-SCNC: 28.1 MMOL/L (ref 22–29)
CREAT SERPL-MCNC: 4.98 MG/DL (ref 0.57–1)
DEPRECATED RDW RBC AUTO: 41.3 FL (ref 37–54)
EGFRCR SERPLBLD CKD-EPI 2021: 11.3 ML/MIN/1.73
EOSINOPHIL # BLD AUTO: 0.05 10*3/MM3 (ref 0–0.4)
EOSINOPHIL NFR BLD AUTO: 0.4 % (ref 0.3–6.2)
ERYTHROCYTE [DISTWIDTH] IN BLOOD BY AUTOMATED COUNT: 14.1 % (ref 12.3–15.4)
GLUCOSE BLDC GLUCOMTR-MCNC: 116 MG/DL (ref 70–130)
GLUCOSE BLDC GLUCOMTR-MCNC: 130 MG/DL (ref 70–130)
GLUCOSE BLDC GLUCOMTR-MCNC: 134 MG/DL (ref 70–130)
GLUCOSE BLDC GLUCOMTR-MCNC: 152 MG/DL (ref 70–130)
GLUCOSE BLDC GLUCOMTR-MCNC: 170 MG/DL (ref 70–130)
GLUCOSE SERPL-MCNC: 120 MG/DL (ref 65–99)
HCT VFR BLD AUTO: 28.6 % (ref 34–46.6)
HGB BLD-MCNC: 9.5 G/DL (ref 12–15.9)
IMM GRANULOCYTES # BLD AUTO: 0.09 10*3/MM3 (ref 0–0.05)
IMM GRANULOCYTES NFR BLD AUTO: 0.7 % (ref 0–0.5)
LYMPHOCYTES # BLD AUTO: 0.49 10*3/MM3 (ref 0.7–3.1)
LYMPHOCYTES NFR BLD AUTO: 4 % (ref 19.6–45.3)
MCH RBC QN AUTO: 27.3 PG (ref 26.6–33)
MCHC RBC AUTO-ENTMCNC: 33.2 G/DL (ref 31.5–35.7)
MCV RBC AUTO: 82.2 FL (ref 79–97)
MONOCYTES # BLD AUTO: 1.19 10*3/MM3 (ref 0.1–0.9)
MONOCYTES NFR BLD AUTO: 9.8 % (ref 5–12)
NEUTROPHILS NFR BLD AUTO: 10.29 10*3/MM3 (ref 1.7–7)
NEUTROPHILS NFR BLD AUTO: 85 % (ref 42.7–76)
NRBC BLD AUTO-RTO: 0 /100 WBC (ref 0–0.2)
PHOSPHATE SERPL-MCNC: 3.7 MG/DL (ref 2.5–4.5)
PLATELET # BLD AUTO: 199 10*3/MM3 (ref 140–450)
PMV BLD AUTO: 12.2 FL (ref 6–12)
POTASSIUM SERPL-SCNC: 3.3 MMOL/L (ref 3.5–5.2)
RBC # BLD AUTO: 3.48 10*6/MM3 (ref 3.77–5.28)
SODIUM SERPL-SCNC: 127 MMOL/L (ref 136–145)
WBC NRBC COR # BLD AUTO: 12.12 10*3/MM3 (ref 3.4–10.8)

## 2023-11-23 PROCEDURE — C9254 INJECTION, LACOSAMIDE: HCPCS | Performed by: NURSE PRACTITIONER

## 2023-11-23 PROCEDURE — 80069 RENAL FUNCTION PANEL: CPT | Performed by: INTERNAL MEDICINE

## 2023-11-23 PROCEDURE — 99024 POSTOP FOLLOW-UP VISIT: CPT | Performed by: THORACIC SURGERY (CARDIOTHORACIC VASCULAR SURGERY)

## 2023-11-23 PROCEDURE — 82948 REAGENT STRIP/BLOOD GLUCOSE: CPT

## 2023-11-23 PROCEDURE — 63710000001 INSULIN REGULAR HUMAN PER 5 UNITS: Performed by: NURSE PRACTITIONER

## 2023-11-23 PROCEDURE — 63710000001 MYCOPHENOLATE MOFETIL PER 250 MG: Performed by: NURSE PRACTITIONER

## 2023-11-23 PROCEDURE — 93010 ELECTROCARDIOGRAM REPORT: CPT | Performed by: INTERNAL MEDICINE

## 2023-11-23 PROCEDURE — 93005 ELECTROCARDIOGRAM TRACING: CPT | Performed by: INTERNAL MEDICINE

## 2023-11-23 PROCEDURE — 99232 SBSQ HOSP IP/OBS MODERATE 35: CPT | Performed by: NURSE PRACTITIONER

## 2023-11-23 PROCEDURE — 25010000002 HEPARIN (PORCINE) PER 1000 UNITS: Performed by: INTERNAL MEDICINE

## 2023-11-23 PROCEDURE — 25010000002 LACOSAMIDE 200 MG/20ML SOLUTION: Performed by: NURSE PRACTITIONER

## 2023-11-23 PROCEDURE — 85025 COMPLETE CBC W/AUTO DIFF WBC: CPT | Performed by: INTERNAL MEDICINE

## 2023-11-23 PROCEDURE — 25010000002 METOCLOPRAMIDE PER 10 MG: Performed by: INTERNAL MEDICINE

## 2023-11-23 RX ORDER — POTASSIUM CHLORIDE 1.5 G/1.58G
40 POWDER, FOR SOLUTION ORAL ONCE
Status: COMPLETED | OUTPATIENT
Start: 2023-11-23 | End: 2023-11-23

## 2023-11-23 RX ORDER — METOCLOPRAMIDE HYDROCHLORIDE 5 MG/ML
5 INJECTION INTRAMUSCULAR; INTRAVENOUS EVERY 6 HOURS
Status: DISCONTINUED | OUTPATIENT
Start: 2023-11-23 | End: 2023-12-05

## 2023-11-23 RX ORDER — MYCOPHENOLATE MOFETIL 500 MG/1
250 TABLET ORAL EVERY 12 HOURS SCHEDULED
Status: DISCONTINUED | OUTPATIENT
Start: 2023-11-23 | End: 2023-12-09 | Stop reason: HOSPADM

## 2023-11-23 RX ORDER — HYDROXYCHLOROQUINE SULFATE 200 MG/1
200 TABLET, FILM COATED ORAL DAILY
Status: DISCONTINUED | OUTPATIENT
Start: 2023-11-23 | End: 2023-12-09 | Stop reason: HOSPADM

## 2023-11-23 RX ADMIN — ESCITALOPRAM OXALATE 10 MG: 10 TABLET, FILM COATED ORAL at 08:43

## 2023-11-23 RX ADMIN — METOCLOPRAMIDE 5 MG: 5 INJECTION, SOLUTION INTRAMUSCULAR; INTRAVENOUS at 18:32

## 2023-11-23 RX ADMIN — HYDROXYCHLOROQUINE SULFATE 200 MG: 200 TABLET ORAL at 12:19

## 2023-11-23 RX ADMIN — MYCOPHENOLATE MOFETIL 250 MG: 500 TABLET, FILM COATED ORAL at 21:23

## 2023-11-23 RX ADMIN — DEXTROSE MONOHYDRATE, SODIUM CHLORIDE, SODIUM LACTATE, CALCIUM CHLORIDE, MAGNESIUM CHLORIDE 2000 ML: 2.5; 538; 448; 18.4; 5.08 SOLUTION INTRAPERITONEAL at 15:17

## 2023-11-23 RX ADMIN — LACOSAMIDE 100 MG: 10 INJECTION INTRAVENOUS at 04:18

## 2023-11-23 RX ADMIN — METOCLOPRAMIDE 7.5 MG: 5 INJECTION, SOLUTION INTRAMUSCULAR; INTRAVENOUS at 00:19

## 2023-11-23 RX ADMIN — DEXTROSE MONOHYDRATE, SODIUM CHLORIDE, SODIUM LACTATE, CALCIUM CHLORIDE, MAGNESIUM CHLORIDE 2000 ML: 2.5; 538; 448; 18.4; 5.08 SOLUTION INTRAPERITONEAL at 06:44

## 2023-11-23 RX ADMIN — DEXTROSE MONOHYDRATE, SODIUM CHLORIDE, SODIUM LACTATE, CALCIUM CHLORIDE, MAGNESIUM CHLORIDE 2000 ML: 2.5; 538; 448; 18.4; 5.08 SOLUTION INTRAPERITONEAL at 00:00

## 2023-11-23 RX ADMIN — CARVEDILOL 25 MG: 25 TABLET, FILM COATED ORAL at 21:23

## 2023-11-23 RX ADMIN — METOCLOPRAMIDE 7.5 MG: 5 INJECTION, SOLUTION INTRAMUSCULAR; INTRAVENOUS at 06:42

## 2023-11-23 RX ADMIN — INSULIN HUMAN 2 UNITS: 100 INJECTION, SOLUTION PARENTERAL at 12:24

## 2023-11-23 RX ADMIN — LACOSAMIDE 100 MG: 10 INJECTION INTRAVENOUS at 15:36

## 2023-11-23 RX ADMIN — MYCOPHENOLATE MOFETIL 250 MG: 500 TABLET, FILM COATED ORAL at 12:19

## 2023-11-23 RX ADMIN — POTASSIUM CHLORIDE 40 MEQ: 1.5 FOR SOLUTION ORAL at 08:43

## 2023-11-23 RX ADMIN — PANTOPRAZOLE SODIUM 40 MG: 40 INJECTION, POWDER, FOR SOLUTION INTRAVENOUS at 06:42

## 2023-11-23 RX ADMIN — DEXTROSE MONOHYDRATE, SODIUM CHLORIDE, SODIUM LACTATE, CALCIUM CHLORIDE, MAGNESIUM CHLORIDE 2000 ML: 2.5; 538; 448; 18.4; 5.08 SOLUTION INTRAPERITONEAL at 10:33

## 2023-11-23 RX ADMIN — ASPIRIN 81 MG: 81 TABLET, CHEWABLE ORAL at 08:43

## 2023-11-23 RX ADMIN — METOCLOPRAMIDE 5 MG: 5 INJECTION, SOLUTION INTRAMUSCULAR; INTRAVENOUS at 12:19

## 2023-11-23 RX ADMIN — INSULIN HUMAN 2 UNITS: 100 INJECTION, SOLUTION PARENTERAL at 17:38

## 2023-11-23 RX ADMIN — HEPARIN SODIUM: 5000 INJECTION INTRAVENOUS; SUBCUTANEOUS at 22:56

## 2023-11-23 RX ADMIN — MUPIROCIN 1 APPLICATION: 20 OINTMENT TOPICAL at 21:23

## 2023-11-23 RX ADMIN — CARVEDILOL 25 MG: 25 TABLET, FILM COATED ORAL at 08:43

## 2023-11-23 RX ADMIN — HEPARIN SODIUM: 5000 INJECTION INTRAVENOUS; SUBCUTANEOUS at 17:38

## 2023-11-23 NOTE — PLAN OF CARE
Goal Outcome Evaluation:  Plan of Care Reviewed With: patient        Progress: no change  Outcome Evaluation: PD performed as ordered with no complication. Tube feeds tolerate, no vomitting this shift. Pt took PO meds. No complaints this shift.

## 2023-11-23 NOTE — SIGNIFICANT NOTE
Pt reports she is too fatigued to attempt therapy today, nurse notified.  Will try again tomorrow, 11/24.

## 2023-11-23 NOTE — PROGRESS NOTES
Hospital Follow Up    LOS:  LOS: 28 days   Patient Name: Dee Herrera  Age/Sex: 33 y.o. female  : 1990  MRN: [id number]    Day of Service: 23   Length of Stay: 28  Encounter Provider: CAESAR Gottlieb  Place of Service: [facility]  Patient Care Team:  Margarita Woods APRN as PCP - General (Nurse Practitioner)  Winnie Sanchez MD as Referring Physician (Obstetrics and Gynecology)  Norberto Almaraz MD PhD as Consulting Physician (Hematology and Oncology)  Lupis Thomas MD as Consulting Physician (Nephrology)  Hair Mckeon MD as Consulting Physician (Nephrology)  Juana Taylor MD as Consulting Physician (Cardiology)    Subjective:     Chief Complaint: aneurysm repair    Interval History: resting comfortable. SR on monitor    Objective:     Objective:  Temp:  [98.8 °F (37.1 °C)-99.5 °F (37.5 °C)] 99.1 °F (37.3 °C)  Heart Rate:  [84-88] 85  Resp:  [16] 16  BP: (109-140)/() 140/95     Intake/Output Summary (Last 24 hours) at 2023 1209  Last data filed at 2023 1030  Gross per 24 hour   Intake 98370 ml   Output 81439 ml   Net -1521 ml     Body mass index is 17.92 kg/m².      23  0417 23  0500 23  0432   Weight: 54.3 kg (119 lb 9.6 oz) 52.6 kg (115 lb 14.4 oz) 48.8 kg (107 lb 9.4 oz)     Weight change:     Physical Exam:   General Appearance:    Awake alert and oriented in no acute distress.   Color:  Skin:  Neuro:  HEENT:    Lungs:     Pink  Warm and dry  No focal, motor or sensory deficits  Neck supple, pupils equal, round and reactive. No JVD, No Bruit  Clear to auscultation,respirations regular, even and                  unlabored    Heart:    Regular rate and rhythm, S1 and S2, no murmur, no gallop, no rub. No edema, DP/PT pulses are 2+   Chest Wall:    No abnormalities observed   Abdomen:     Normal bowel sounds, no masses, no organomegaly, soft        non-tender, non-distended, no guarding, no  "ascites noted   Extremities:   Moves all extremities well, no edema, no cyanosis, no redness       Lab Review:   Results from last 7 days   Lab Units 11/23/23  0428 11/22/23  0550 11/19/23  0308 11/18/23  0330   SODIUM mmol/L 127* 127*   < > 130*   POTASSIUM mmol/L 3.3* 4.0   < > 3.1*   CHLORIDE mmol/L 88* 88*   < > 94*   CO2 mmol/L 28.1 27.5   < > 26.0   BUN mg/dL 40* 43*   < > 29*   CREATININE mg/dL 4.98* 4.88*   < > 4.64*   GLUCOSE mg/dL 120* 112*   < > 158*   CALCIUM mg/dL 8.5* 8.8   < > 8.6   AST (SGOT) U/L  --   --   --  34*   ALT (SGPT) U/L  --   --   --  21    < > = values in this interval not displayed.     Results from last 7 days   Lab Units 11/22/23  0550 11/21/23  2114 11/21/23  1718   HSTROP T ng/L 438* 417* 407*     Results from last 7 days   Lab Units 11/23/23  0428 11/22/23  0550   WBC 10*3/mm3 12.12* 12.10*   HEMOGLOBIN g/dL 9.5* 10.1*   HEMATOCRIT % 28.6* 30.1*   PLATELETS 10*3/mm3 199 235         Results from last 7 days   Lab Units 11/22/23  0550 11/21/23  1718   MAGNESIUM mg/dL 2.2 1.5*           Invalid input(s): \"LDLCALC\"      Results from last 7 days   Lab Units 11/21/23  0401   TSH uIU/mL 4.220*     I reviewed the patient's new clinical results.  I personally viewed and interpreted the patient's EKG  Current Medications:   Scheduled Meds:aspirin, 81 mg, Nasogastric, Daily  carvedilol, 25 mg, Nasogastric, Q12H  chlorhexidine, 15 mL, Mouth/Throat, Q12H  Delflex-LC/2.5% Dextrose, 2,000 mL, Intraperitoneal, 5 Exchanges Daily - Dwell Overnight  escitalopram, 10 mg, Nasogastric, Daily  First Mouthwash (Magic Mouthwash), 10 mL, Swish & Spit, Q6H  hydroxychloroquine, 200 mg, Oral, Daily  insulin regular, 2-7 Units, Subcutaneous, Q6H  Lacosamide, 100 mg, Intravenous, Q12H  lactulose, 10 g, Nasogastric, TID  lidocaine, 10 mL, Subcutaneous, Once  metoclopramide, 5 mg, Intravenous, Q6H  mupirocin, , Each Nare, BID  mycophenolate, 250 mg, Oral, Q12H  pantoprazole, 40 mg, Intravenous, Q " AM      Continuous Infusions:hold, 1 each        Allergies:  Allergies   Allergen Reactions    Minoxidil Hives and Other (See Comments)     Pericardial effusion .       Assessment:     Ascending aortic aneurysm Dissection/Severe AR- S/p Surgical repair and replacement with 26mm graft and repair of Aortic valve  ESRD on HD  SLE  Thrombocytopenia  Cardiac tamponade with reexploration and removal of compression RV clot  Encephalopathy- improving  Essential HtN- controlled on current regimen  Right MCA CVA  Chronic Anemia  Chronic Immunosupression  Plan:           CAESAR Gottlieb  11/23/23  12:09 EST  Electronically signed by CAESAR Gottlieb, 11/23/23, 12:09 PM EST.

## 2023-11-23 NOTE — PROGRESS NOTES
[facility] Progress Note      Patient Name: Dee Herrera  : 1990  MRN: [id number]  Primary Care Physician:  Margarita Woods, CAESAR  Date of admission: 10/26/2023    Subjective     Interval History:   Follow-up ESRD.  PD fluid with fibrin per RN.  Doing and draining just fine.  Bowels moved this morning.  Taking her pills p.o.  Not eating.  Says she will try vanilla Ensure plus.    Review of Systems:   As noted above    Objective     Vitals:   Temp:  [98.8 °F (37.1 °C)-99.5 °F (37.5 °C)] 99.1 °F (37.3 °C)  Heart Rate:  [84-88] 85  Resp:  [16] 16  BP: (109-140)/() 140/95    Intake/Output Summary (Last 24 hours) at 2023 1229  Last data filed at 2023 1030  Gross per 24 hour   Intake 84585 ml   Output 57148 ml   Net -1521 ml       Physical Exam:    General Appearance: Very withdrawn.  Her eyes are at least open today during conversation.    Voice a little bit more audible.  Skin: warm and dry  HEENT: oral mucosa dry.  Core track in place.  Nonicteric sclera  Neck: supple, no JVD  Lungs: clear to auscultation bilaterally.  Heart: RRR, normal S1 and S2  Abdomen: soft, nontender, nondistended.  PD catheter exit site clean.  Extremities: no edema.  Neuro: Voice a tiny bit more audible today.  Remains very withdrawn.    Scheduled Meds:     aspirin, 81 mg, Nasogastric, Daily  carvedilol, 25 mg, Nasogastric, Q12H  chlorhexidine, 15 mL, Mouth/Throat, Q12H  Delflex-LC/2.5% Dextrose, 2,000 mL, Intraperitoneal, 5 Exchanges Daily - Dwell Overnight  escitalopram, 10 mg, Nasogastric, Daily  First Mouthwash (Magic Mouthwash), 10 mL, Swish & Spit, Q6H  hydroxychloroquine, 200 mg, Oral, Daily  insulin regular, 2-7 Units, Subcutaneous, Q6H  Lacosamide, 100 mg, Intravenous, Q12H  lactulose, 10 g, Nasogastric, TID  lidocaine, 10 mL, Subcutaneous, Once  metoclopramide, 5 mg, Intravenous, Q6H  mupirocin, , Each Nare, BID  mycophenolate, 250 mg, Oral, Q12H  pantoprazole, 40 mg,  Intravenous, Q AM      IV Meds:   hold, 1 each        Results Reviewed:   I have personally reviewed the results from the time of this admission to 11/23/2023 12:29 EST     Results from last 7 days   Lab Units 11/23/23 0428 11/22/23 0550 11/21/23 1718 11/21/23 0401 11/19/23  0308 11/18/23  0330   SODIUM mmol/L 127* 127*  --  128*   < > 130*   POTASSIUM mmol/L 3.3* 4.0 3.6 3.7   < > 3.1*   CHLORIDE mmol/L 88* 88*  --  90*   < > 94*   CO2 mmol/L 28.1 27.5  --  25.1   < > 26.0   BUN mg/dL 40* 43*  --  35*   < > 29*   CREATININE mg/dL 4.98* 4.88*  --  5.37*   < > 4.64*   CALCIUM mg/dL 8.5* 8.8  --  8.6   < > 8.6   BILIRUBIN mg/dL  --   --   --   --   --  0.2   ALK PHOS U/L  --   --   --   --   --  74   ALT (SGPT) U/L  --   --   --   --   --  21   AST (SGOT) U/L  --   --   --   --   --  34*   GLUCOSE mg/dL 120* 112*  --  120*   < > 158*    < > = values in this interval not displayed.       Estimated Creatinine Clearance: 12.6 mL/min (A) (by C-G formula based on SCr of 4.98 mg/dL (H)).    Results from last 7 days   Lab Units 11/23/23 0428 11/22/23 0550 11/21/23 1718 11/21/23 0401 11/20/23  0317   MAGNESIUM mg/dL  --  2.2 1.5*  --  1.7   PHOSPHORUS mg/dL 3.7 3.7  --  5.0* 4.6*             Results from last 7 days   Lab Units 11/23/23 0428 11/22/23  0550 11/21/23  0713 11/20/23 0317 11/19/23  0308   WBC 10*3/mm3 12.12* 12.10* 7.95 8.30 9.43   HEMOGLOBIN g/dL 9.5* 10.1* 9.9* 9.1* 9.5*   PLATELETS 10*3/mm3 199 235 210 201 180             Assessment / Plan     ASSESSMENT:  ESRD.  Currently on peritoneal dialysis.  Her clearance appears to be adequate however, she has poor muscle mass so her creatinine may actually reflect inadequate dialysis.  Refuses PEG tube for nutrition.  Plan was originally to switch to hemodialysis temporarily until she can get nutritionally replete.  ED not filling or draining well.  Suspect due to constipation.  Will check KUB and add lactulose per core track.  2.  Acute CVA cortical infarct  in the anterior inferior medial right frontal lobe.  Subacute subdural hematoma right occipital.  3.  Ascending aortic aneurysm with subacute/chronic aortic root dissection.  Status post repair of proximal aortic aneurysm 11/2/2023.  Re- exploration for cardiac tamponade 11/6/2023.  4.  Seizure disorder currently on Vimpat  5.  Severe protein calorie malnutrition now on tube feeds.  Refuses PEG.  6.  SLE.  Currently off immunosuppression.  C3 mildly low, C4 normal.  Anti-double-stranded DNA antibody elevated 11/20/2023.  Plaquenil and CellCept resumed today.  7.  Anemia CKD.  PLAN:  Add some heparin to her PD fluid.  2.  Prognosis remains very guarded.  Thank you for involving us in the care of Dee Herrera.  Please feel free to call with any questions.    Regi Hyde MD  11/23/23  12:29 [facility]    [facility]  [phone]    Please note that portions of this note were completed with a voice recognition program.

## 2023-11-23 NOTE — PROGRESS NOTES
[facility] follow up d/t depression; this writer reviewed chart and spoke with RN Thu. Per RN, patient slept all night; she took some of her medications.  Per EMR, patient started Lexapro on 11/14; psychiatry has signed off. No further needs/concerns noted at this time per RN and/or medical team; [facility] to continue following.

## 2023-11-23 NOTE — PROGRESS NOTES
" LOS: 28 days   Patient Care Team:  Margarita Woods APRN as PCP - General (Nurse Practitioner)  Winnie Sanchez MD as Referring Physician (Obstetrics and Gynecology)  Norberto Almaraz MD PhD as Consulting Physician (Hematology and Oncology)  Lupis Thomas MD as Consulting Physician (Nephrology)  Hair Mckeon MD as Consulting Physician (Nephrology)  Juana Taylor MD as Consulting Physician (Cardiology)    Chief Complaint: post op    Subjective:  Symptoms:  Stable.  No shortness of breath, cough or chest pain.    Diet:  Adequate intake (TF).  No nausea or vomiting.    Activity level: Impaired due to weakness.    Pain:  She reports no pain.      Vital Signs  Temp:  [98.8 °F (37.1 °C)-99.5 °F (37.5 °C)] 99.1 °F (37.3 °C)  Heart Rate:  [84-88] 85  Resp:  [16] 16  BP: (109-140)/() 140/95  Body mass index is 17.92 kg/m².    Intake/Output Summary (Last 24 hours) at 11/23/2023 0842  Last data filed at 11/23/2023 0730  Gross per 24 hour   Intake 61670 ml   Output 50454 ml   Net -1141 ml     I/O this shift:  In: 2000   Out: 2000 11/20/23  0417 11/21/23  0500 11/22/23  0432   Weight: 54.3 kg (119 lb 9.6 oz) 52.6 kg (115 lb 14.4 oz) 48.8 kg (107 lb 9.4 oz)     Objective:  General Appearance:  Comfortable and in no acute distress.    Vital signs: (most recent): Blood pressure 140/95, pulse 85, temperature 99.1 °F (37.3 °C), temperature source Oral, resp. rate 16, height 165 cm (64.96\"), weight 48.8 kg (107 lb 9.4 oz), last menstrual period 08/10/2022, SpO2 99%, not currently breastfeeding.  Vital signs are normal.  No fever.    Output: No urine output and producing stool.    Lungs:  Normal effort and normal respiratory rate.  There are decreased breath sounds.    Heart: Normal rate.  Regular rhythm.  (SR on tele monitor)  Abdomen: Abdomen is soft and non-distended.  Bowel sounds are normal.     Pulses: Distal pulses are intact.    Neurological: Patient is oriented to person, place and time.  " "(drowsy).    Skin:  Warm and dry.  (Sternal dressing clean, dry, and intact)          Results Review:        WBC WBC   Date Value Ref Range Status   11/23/2023 12.12 (H) 3.40 - 10.80 10*3/mm3 Final   11/22/2023 12.10 (H) 3.40 - 10.80 10*3/mm3 Final   11/21/2023 7.95 3.40 - 10.80 10*3/mm3 Final      HGB Hemoglobin   Date Value Ref Range Status   11/23/2023 9.5 (L) 12.0 - 15.9 g/dL Final   11/22/2023 10.1 (L) 12.0 - 15.9 g/dL Final   11/21/2023 9.9 (L) 12.0 - 15.9 g/dL Final      HCT Hematocrit   Date Value Ref Range Status   11/23/2023 28.6 (L) 34.0 - 46.6 % Final   11/22/2023 30.1 (L) 34.0 - 46.6 % Final   11/21/2023 30.3 (L) 34.0 - 46.6 % Final      Platelets Platelets   Date Value Ref Range Status   11/23/2023 199 140 - 450 10*3/mm3 Final   11/22/2023 235 140 - 450 10*3/mm3 Final   11/21/2023 210 140 - 450 10*3/mm3 Final        PT/INR:  No results found for: \"PROTIME\"/No results found for: \"INR\"    Sodium Sodium   Date Value Ref Range Status   11/23/2023 127 (L) 136 - 145 mmol/L Final   11/22/2023 127 (L) 136 - 145 mmol/L Final   11/21/2023 128 (L) 136 - 145 mmol/L Final      Potassium Potassium   Date Value Ref Range Status   11/23/2023 3.3 (L) 3.5 - 5.2 mmol/L Final     Comment:     Slight hemolysis detected by analyzer. Result may be falsely elevated.   11/22/2023 4.0 3.5 - 5.2 mmol/L Final     Comment:     Slight hemolysis detected by analyzer. Result may be falsely elevated.   11/21/2023 3.6 3.5 - 5.2 mmol/L Final   11/21/2023 3.7 3.5 - 5.2 mmol/L Final     Comment:     Slight hemolysis detected by analyzer. Result may be falsely elevated.      Chloride Chloride   Date Value Ref Range Status   11/23/2023 88 (L) 98 - 107 mmol/L Final   11/22/2023 88 (L) 98 - 107 mmol/L Final   11/21/2023 90 (L) 98 - 107 mmol/L Final      Bicarbonate CO2   Date Value Ref Range Status   11/23/2023 28.1 22.0 - 29.0 mmol/L Final   11/22/2023 27.5 22.0 - 29.0 mmol/L Final   11/21/2023 25.1 22.0 - 29.0 mmol/L Final      BUN BUN "   Date Value Ref Range Status   11/23/2023 40 (H) 6 - 20 mg/dL Final   11/22/2023 43 (H) 6 - 20 mg/dL Final   11/21/2023 35 (H) 6 - 20 mg/dL Final      Creatinine Creatinine   Date Value Ref Range Status   11/23/2023 4.98 (H) 0.57 - 1.00 mg/dL Final   11/22/2023 4.88 (H) 0.57 - 1.00 mg/dL Final   11/21/2023 5.37 (H) 0.57 - 1.00 mg/dL Final      Calcium Calcium   Date Value Ref Range Status   11/23/2023 8.5 (L) 8.6 - 10.5 mg/dL Final   11/22/2023 8.8 8.6 - 10.5 mg/dL Final   11/21/2023 8.6 8.6 - 10.5 mg/dL Final      Magnesium Magnesium   Date Value Ref Range Status   11/22/2023 2.2 1.6 - 2.6 mg/dL Final   11/21/2023 1.5 (L) 1.6 - 2.6 mg/dL Final          aspirin, 81 mg, Nasogastric, Daily  carvedilol, 25 mg, Nasogastric, Q12H  chlorhexidine, 15 mL, Mouth/Throat, Q12H  Delflex-LC/2.5% Dextrose, 2,000 mL, Intraperitoneal, 5 Exchanges Daily - Dwell Overnight  escitalopram, 10 mg, Nasogastric, Daily  First Mouthwash (Magic Mouthwash), 10 mL, Swish & Spit, Q6H  insulin regular, 2-7 Units, Subcutaneous, Q6H  Lacosamide, 100 mg, Intravenous, Q12H  lactulose, 10 g, Nasogastric, TID  lidocaine, 10 mL, Subcutaneous, Once  metoclopramide, 7.5 mg, Intravenous, Q6H  mupirocin, , Each Nare, BID  pantoprazole, 40 mg, Intravenous, Q AM  potassium chloride, 40 mEq, Nasogastric, Once      hold, 1 each      Assessment & Plan  - Ascending aortic aneurysm with dissection- s/p ascending aortic dissection repair/proximal replacement, gacron interposition graft, kelli procedure; insertion of right femoral shiley---POD#21; Pagni  - Cardiac tamponade- reexploration for bleeding, removal of large clot compressing the RV---POD#17; Camporrotondo  - severe aortic valve insufficiency  - Encephalopathy, improving   - ESRD on PD  - Lupus--on Cellcept/plaquenil; currently held  - Epilepsy  - chronic immunosuppression  - hypertension  - chronic anemia  - TCP--chronic; improving  - Depression--started on lexapro 11/14  - right MCA CVA--continue ASA  per neuro     Remains withdrawn. No further episodes of vomiting. Continued on Reglan, and lactulose started yesterday. Still not taking much in by mouth. Remains on continuous tube feedings  General surgery has been consulted for PEG placement, although patient is refusing at this time  Remains in NSR. BP stable this morning  On PD--managed per nephrology   She is very weak. Continue aggressive PT/OT  Continue supportive care    ADDENDUM: Resume Cellcept/plaquenil today per Dr. Adam Caal, APRN  11/23/23  08:42 EST

## 2023-11-23 NOTE — PLAN OF CARE
Goal Outcome Evaluation:         Pt remains flat and withdrawn. Refusing some oral meds. Potassium replaced. Large amount of fibrin noted in PD output. Heparin added to exchanges. Tube feeds continue. Pt refused breakfast, but ate about half of lunch. No co of n/v. Moderate BM this morning. VSS.

## 2023-11-23 NOTE — PROGRESS NOTES
" LOS: 28 days     Name: Dee Herrera  Age: 33 y.o.  Sex: female  :  1990  MRN: [id number]         Primary Care Physician: Margarita Woods APRN    Subjective   Subjective  Tells me she feels tired and worn out.  Otherwise she does not engage with me in conversation.  No additional vomiting episodes.  Had a bowel movement this morning.  Has been restarted on Plaquenil and CellCept today.    Objective   Vital Signs  Temp:  [98.8 °F (37.1 °C)-99.5 °F (37.5 °C)] 99.1 °F (37.3 °C)  Heart Rate:  [84-88] 85  Resp:  [16] 16  BP: (109-140)/() 140/95  Body mass index is 17.92 kg/m².    Objective:  General Appearance:  Comfortable and in no acute distress.    Vital signs: (most recent): Blood pressure 140/95, pulse 85, temperature 99.1 °F (37.3 °C), temperature source Oral, resp. rate 16, height 165 cm (64.96\"), weight 48.8 kg (107 lb 9.4 oz), last menstrual period 08/10/2022, SpO2 99%, not currently breastfeeding.    Lungs:  Normal effort and normal respiratory rate.  She is not in respiratory distress.  There are decreased breath sounds.    Heart: Normal rate.  Regular rhythm.    Abdomen: Abdomen is soft.  Bowel sounds are normal.   There is no abdominal tenderness.     Extremities: There is no dependent edema or local swelling.    Neurological: Patient is alert and oriented to person, place and time.    Skin:  Warm and dry.                Results Review:       I reviewed the patient's new clinical results.    Results from last 7 days   Lab Units 23  0428 23  0550 23  0713 23  0317 23  0308 23  0330 23  0243   WBC 10*3/mm3 12.12* 12.10* 7.95 8.30 9.43 10.96* 13.12*   HEMOGLOBIN g/dL 9.5* 10.1* 9.9* 9.1* 9.5* 9.1* 8.5*   PLATELETS 10*3/mm3 199 235 210 201 180 172 154     Results from last 7 days   Lab Units 23  0428 23  0550 23  1718 23  0401 23  0317 23  0308 23  0330 23  0243   SODIUM mmol/L 127* 127*  --  128* 130* " 128* 130* 130*   POTASSIUM mmol/L 3.3* 4.0 3.6 3.7 4.0 3.9 3.1* 3.2*   CHLORIDE mmol/L 88* 88*  --  90* 91* 92* 94* 94*   CO2 mmol/L 28.1 27.5  --  25.1 24.9 25.1 26.0 24.6   BUN mg/dL 40* 43*  --  35* 32* 27* 29* 31*   CREATININE mg/dL 4.98* 4.88*  --  5.37* 5.01* 4.50* 4.64* 4.60*   CALCIUM mg/dL 8.5* 8.8  --  8.6 8.4* 8.2* 8.6 8.1*   GLUCOSE mg/dL 120* 112*  --  120* 94 107* 158* 189*                 Scheduled Meds:   aspirin, 81 mg, Nasogastric, Daily  carvedilol, 25 mg, Nasogastric, Q12H  chlorhexidine, 15 mL, Mouth/Throat, Q12H  Delflex-LC/2.5% Dextrose, 2,000 mL, Intraperitoneal, 5 Exchanges Daily - Dwell Overnight  escitalopram, 10 mg, Nasogastric, Daily  First Mouthwash (Magic Mouthwash), 10 mL, Swish & Spit, Q6H  hydroxychloroquine, 200 mg, Oral, Daily  insulin regular, 2-7 Units, Subcutaneous, Q6H  Lacosamide, 100 mg, Intravenous, Q12H  lactulose, 10 g, Nasogastric, TID  lidocaine, 10 mL, Subcutaneous, Once  metoclopramide, 7.5 mg, Intravenous, Q6H  mupirocin, , Each Nare, BID  mycophenolate, 250 mg, Oral, Q12H  pantoprazole, 40 mg, Intravenous, Q AM      PRN Meds:     acetaminophen **OR** acetaminophen **OR** acetaminophen    bisacodyl    bisacodyl    dextrose    dextrose    glucagon (human recombinant)    heparin (porcine)    hold    hydrALAZINE    ipratropium-albuterol    [] Morphine **AND** naloxone    nitroglycerin    ondansetron    polyethylene glycol  Continuous Infusions:  hold, 1 each        Assessment & Plan   Active Hospital Problems    Diagnosis  POA    **Dissecting ascending aortic aneurysm [I71.010]  Yes    Recent cerebrovascular accident (CVA) [Z86.73]  Yes    Aortic valve lesion [I35.8]  Yes    Severe aortic valve regurgitation [I35.1]  Yes    Hyponatremia [E87.1]  Yes    Poor appetite [R63.0]  Yes    Moderate malnutrition [E44.0]  Yes    Chronic diastolic CHF (congestive heart failure) [I50.32]  Yes    Anemia due to chronic kidney disease, on chronic dialysis [N18.6, D63.1, Z99.2]   Not Applicable    Peritoneal dialysis catheter in place [Z99.2]  Not Applicable    Essential hypertension [I10]  Yes    End stage renal disease on dialysis [N18.6, Z99.2]  Not Applicable    Seizure disorder [G40.909]  Yes    Elevated liver function tests [R79.89]  Yes    Pericardial effusion [I31.39]  Yes    Systemic lupus erythematosus [M32.9]  Yes    Thrombocytopenia [D69.6]  Yes    Hypertension secondary to other renal disorders [I15.1]  Yes    Pancytopenia [D61.818]  Yes    Vitamin D deficiency [E55.9]  Yes      Resolved Hospital Problems    Diagnosis Date Resolved POA    Abdominal pain [R10.9] 10/28/2023 Yes       Assessment & Plan    -Oral intake and nutrition status remained poor.  Continue continuous tube feeds.  Vomiting has resolved.  Bowel movement reported this morning.  Continue lactulose.  I will decrease the Reglan dose today.  -Neurology has provided recommendations regarding her acute CVA.  Currently on low-dose aspirin.  On Vimpat for seizures.  -Keppra discontinued given possible effects on her mood.  Remains on Lexapro.  -She remains very withdrawn.  Psychiatry did not have any specific recommendations.  -Continues on peritoneal dialysis as per nephrology.   -CellCept and Plaquenil have been restarted  -Blood sugars well controlled.  Continue low-dose sliding scale insulin  -Continue therapy services     Dispo  To be determined     Discussed with patient's mother on 11/21.  Her prognosis is guarded.  Palliative care now following.      Expected Discharge Date: 11/24/2023; Expected Discharge Time:      Bobby Carter MD  [facility]  11/23/23  10:21 EST

## 2023-11-24 LAB
ALBUMIN SERPL-MCNC: 2.1 G/DL (ref 3.5–5.2)
ANION GAP SERPL CALCULATED.3IONS-SCNC: 10.2 MMOL/L (ref 5–15)
BASOPHILS # BLD AUTO: 0.03 10*3/MM3 (ref 0–0.2)
BASOPHILS NFR BLD AUTO: 0.3 % (ref 0–1.5)
BUN SERPL-MCNC: 49 MG/DL (ref 6–20)
BUN/CREAT SERPL: 10.7 (ref 7–25)
CALCIUM SPEC-SCNC: 8.8 MG/DL (ref 8.6–10.5)
CHLORIDE SERPL-SCNC: 87 MMOL/L (ref 98–107)
CO2 SERPL-SCNC: 29.8 MMOL/L (ref 22–29)
CREAT SERPL-MCNC: 4.56 MG/DL (ref 0.57–1)
DEPRECATED RDW RBC AUTO: 40.6 FL (ref 37–54)
EGFRCR SERPLBLD CKD-EPI 2021: 12.5 ML/MIN/1.73
EOSINOPHIL # BLD AUTO: 0.07 10*3/MM3 (ref 0–0.4)
EOSINOPHIL NFR BLD AUTO: 0.7 % (ref 0.3–6.2)
ERYTHROCYTE [DISTWIDTH] IN BLOOD BY AUTOMATED COUNT: 14 % (ref 12.3–15.4)
GLUCOSE BLDC GLUCOMTR-MCNC: 112 MG/DL (ref 70–130)
GLUCOSE BLDC GLUCOMTR-MCNC: 118 MG/DL (ref 70–130)
GLUCOSE BLDC GLUCOMTR-MCNC: 121 MG/DL (ref 70–130)
GLUCOSE BLDC GLUCOMTR-MCNC: 142 MG/DL (ref 70–130)
GLUCOSE BLDC GLUCOMTR-MCNC: 142 MG/DL (ref 70–130)
GLUCOSE SERPL-MCNC: 107 MG/DL (ref 65–99)
HCT VFR BLD AUTO: 29.2 % (ref 34–46.6)
HGB BLD-MCNC: 9.6 G/DL (ref 12–15.9)
IMM GRANULOCYTES # BLD AUTO: 0.12 10*3/MM3 (ref 0–0.05)
IMM GRANULOCYTES NFR BLD AUTO: 1.1 % (ref 0–0.5)
LYMPHOCYTES # BLD AUTO: 0.36 10*3/MM3 (ref 0.7–3.1)
LYMPHOCYTES NFR BLD AUTO: 3.4 % (ref 19.6–45.3)
MAGNESIUM SERPL-MCNC: 1.7 MG/DL (ref 1.6–2.6)
MCH RBC QN AUTO: 26.9 PG (ref 26.6–33)
MCHC RBC AUTO-ENTMCNC: 32.9 G/DL (ref 31.5–35.7)
MCV RBC AUTO: 81.8 FL (ref 79–97)
MONOCYTES # BLD AUTO: 1.03 10*3/MM3 (ref 0.1–0.9)
MONOCYTES NFR BLD AUTO: 9.7 % (ref 5–12)
NEUTROPHILS NFR BLD AUTO: 8.98 10*3/MM3 (ref 1.7–7)
NEUTROPHILS NFR BLD AUTO: 84.8 % (ref 42.7–76)
NRBC BLD AUTO-RTO: 0 /100 WBC (ref 0–0.2)
PHOSPHATE SERPL-MCNC: 2.6 MG/DL (ref 2.5–4.5)
PLATELET # BLD AUTO: 200 10*3/MM3 (ref 140–450)
PMV BLD AUTO: 12.5 FL (ref 6–12)
POTASSIUM SERPL-SCNC: 3.6 MMOL/L (ref 3.5–5.2)
QT INTERVAL: 377 MS
QTC INTERVAL: 454 MS
RBC # BLD AUTO: 3.57 10*6/MM3 (ref 3.77–5.28)
SODIUM SERPL-SCNC: 127 MMOL/L (ref 136–145)
WBC NRBC COR # BLD AUTO: 10.59 10*3/MM3 (ref 3.4–10.8)

## 2023-11-24 PROCEDURE — 97110 THERAPEUTIC EXERCISES: CPT

## 2023-11-24 PROCEDURE — C9254 INJECTION, LACOSAMIDE: HCPCS | Performed by: NURSE PRACTITIONER

## 2023-11-24 PROCEDURE — 80069 RENAL FUNCTION PANEL: CPT | Performed by: INTERNAL MEDICINE

## 2023-11-24 PROCEDURE — 97530 THERAPEUTIC ACTIVITIES: CPT

## 2023-11-24 PROCEDURE — 25010000002 METOCLOPRAMIDE PER 10 MG: Performed by: INTERNAL MEDICINE

## 2023-11-24 PROCEDURE — 83735 ASSAY OF MAGNESIUM: CPT | Performed by: INTERNAL MEDICINE

## 2023-11-24 PROCEDURE — 25010000002 HEPARIN (PORCINE) PER 1000 UNITS: Performed by: INTERNAL MEDICINE

## 2023-11-24 PROCEDURE — 25010000002 LACOSAMIDE 200 MG/20ML SOLUTION: Performed by: NURSE PRACTITIONER

## 2023-11-24 PROCEDURE — 82948 REAGENT STRIP/BLOOD GLUCOSE: CPT

## 2023-11-24 PROCEDURE — 63710000001 MYCOPHENOLATE MOFETIL PER 250 MG: Performed by: NURSE PRACTITIONER

## 2023-11-24 PROCEDURE — 99232 SBSQ HOSP IP/OBS MODERATE 35: CPT | Performed by: NURSE PRACTITIONER

## 2023-11-24 PROCEDURE — 85025 COMPLETE CBC W/AUTO DIFF WBC: CPT | Performed by: INTERNAL MEDICINE

## 2023-11-24 RX ADMIN — DEXTROSE MONOHYDRATE, SODIUM CHLORIDE, SODIUM LACTATE, CALCIUM CHLORIDE, MAGNESIUM CHLORIDE 2000 ML: 2.5; 538; 448; 18.4; 5.08 SOLUTION INTRAPERITONEAL at 18:10

## 2023-11-24 RX ADMIN — ESCITALOPRAM OXALATE 10 MG: 10 TABLET, FILM COATED ORAL at 10:13

## 2023-11-24 RX ADMIN — DEXTROSE MONOHYDRATE, SODIUM CHLORIDE, SODIUM LACTATE, CALCIUM CHLORIDE, MAGNESIUM CHLORIDE 2000 ML: 2.5; 538; 448; 18.4; 5.08 SOLUTION INTRAPERITONEAL at 14:15

## 2023-11-24 RX ADMIN — ASPIRIN 81 MG: 81 TABLET, CHEWABLE ORAL at 10:13

## 2023-11-24 RX ADMIN — HEPARIN SODIUM: 5000 INJECTION INTRAVENOUS; SUBCUTANEOUS at 06:33

## 2023-11-24 RX ADMIN — MYCOPHENOLATE MOFETIL 250 MG: 500 TABLET, FILM COATED ORAL at 23:51

## 2023-11-24 RX ADMIN — METOCLOPRAMIDE 5 MG: 5 INJECTION, SOLUTION INTRAMUSCULAR; INTRAVENOUS at 01:49

## 2023-11-24 RX ADMIN — PANTOPRAZOLE SODIUM 40 MG: 40 INJECTION, POWDER, FOR SOLUTION INTRAVENOUS at 06:33

## 2023-11-24 RX ADMIN — CARVEDILOL 25 MG: 25 TABLET, FILM COATED ORAL at 10:13

## 2023-11-24 RX ADMIN — DEXTROSE MONOHYDRATE, SODIUM CHLORIDE, SODIUM LACTATE, CALCIUM CHLORIDE, MAGNESIUM CHLORIDE 2000 ML: 2.5; 538; 448; 18.4; 5.08 SOLUTION INTRAPERITONEAL at 22:14

## 2023-11-24 RX ADMIN — HEPARIN SODIUM: 5000 INJECTION INTRAVENOUS; SUBCUTANEOUS at 10:15

## 2023-11-24 RX ADMIN — METOCLOPRAMIDE 5 MG: 5 INJECTION, SOLUTION INTRAMUSCULAR; INTRAVENOUS at 13:43

## 2023-11-24 RX ADMIN — CARVEDILOL 25 MG: 25 TABLET, FILM COATED ORAL at 22:09

## 2023-11-24 RX ADMIN — MUPIROCIN: 20 OINTMENT TOPICAL at 10:00

## 2023-11-24 RX ADMIN — METOCLOPRAMIDE 5 MG: 5 INJECTION, SOLUTION INTRAMUSCULAR; INTRAVENOUS at 18:09

## 2023-11-24 RX ADMIN — METOCLOPRAMIDE 5 MG: 5 INJECTION, SOLUTION INTRAMUSCULAR; INTRAVENOUS at 06:33

## 2023-11-24 RX ADMIN — MYCOPHENOLATE MOFETIL 250 MG: 500 TABLET, FILM COATED ORAL at 10:13

## 2023-11-24 RX ADMIN — LACOSAMIDE 100 MG: 10 INJECTION INTRAVENOUS at 16:48

## 2023-11-24 RX ADMIN — LACOSAMIDE 100 MG: 10 INJECTION INTRAVENOUS at 03:45

## 2023-11-24 RX ADMIN — HYDROXYCHLOROQUINE SULFATE 200 MG: 200 TABLET ORAL at 10:13

## 2023-11-24 NOTE — PROGRESS NOTES
Hospital Follow Up    LOS:  LOS: 29 days   Patient Name: Dee Herrera  Age/Sex: 33 y.o. female  : 1990  MRN: [id number]    Day of Service: 23   Length of Stay: 29  Encounter Provider: CAESAR Gottlieb  Place of Service: [facility] CARDIOLOGY  Patient Care Team:  Margarita Woods APRN as PCP - General (Nurse Practitioner)  Winnie Sanchez MD as Referring Physician (Obstetrics and Gynecology)  Norberto Almaraz MD PhD as Consulting Physician (Hematology and Oncology)  Lupis Thomas MD as Consulting Physician (Nephrology)  Hair Mckeon MD as Consulting Physician (Nephrology)  Juana Taylor MD as Consulting Physician (Cardiology)    Subjective:     Chief Complaint: aneurysm repair    Interval History: no complaints today. SR in the 80's    Objective:     Objective:  Temp:  [98.1 °F (36.7 °C)-98.2 °F (36.8 °C)] 98.2 °F (36.8 °C)  Heart Rate:  [87-92] 92  Resp:  [16] 16  BP: (117-128)/(86-96) 128/96     Intake/Output Summary (Last 24 hours) at 2023 1437  Last data filed at 2023 1015  Gross per 24 hour   Intake 6453 ml   Output 7000 ml   Net -547 ml     Body mass index is 17.26 kg/m².      23  0500 23  0432 23  0500   Weight: 52.6 kg (115 lb 14.4 oz) 48.8 kg (107 lb 9.4 oz) 47 kg (103 lb 9.9 oz)     Weight change:     Physical Exam:   General Appearance:    Awake alert and oriented in no acute distress.   Color:  Skin:  Neuro:  HEENT:    Lungs:     Pink  Warm and dry  No focal, motor or sensory deficits  Neck supple, pupils equal, round and reactive. No JVD, No Bruit  Clear to auscultation,respirations regular, even and                  unlabored    Heart:    Regular rate and rhythm, S1 and S2, no murmur, no gallop, no rub. No edema, DP/PT pulses are 2+   Chest Wall:    No abnormalities observed   Abdomen:     Normal bowel sounds, no masses, no organomegaly, soft        non-tender, non-distended, no guarding, no  "ascites noted   Extremities:   Moves all extremities well, no edema, no cyanosis, no redness       Lab Review:   Results from last 7 days   Lab Units 11/24/23  0556 11/23/23  0428 11/19/23  0308 11/18/23  0330   SODIUM mmol/L 127* 127*   < > 130*   POTASSIUM mmol/L 3.6 3.3*   < > 3.1*   CHLORIDE mmol/L 87* 88*   < > 94*   CO2 mmol/L 29.8* 28.1   < > 26.0   BUN mg/dL 49* 40*   < > 29*   CREATININE mg/dL 4.56* 4.98*   < > 4.64*   GLUCOSE mg/dL 107* 120*   < > 158*   CALCIUM mg/dL 8.8 8.5*   < > 8.6   AST (SGOT) U/L  --   --   --  34*   ALT (SGPT) U/L  --   --   --  21    < > = values in this interval not displayed.     Results from last 7 days   Lab Units 11/22/23  0550 11/21/23  2114 11/21/23  1718   HSTROP T ng/L 438* 417* 407*     Results from last 7 days   Lab Units 11/24/23  0556 11/23/23  0428   WBC 10*3/mm3 10.59 12.12*   HEMOGLOBIN g/dL 9.6* 9.5*   HEMATOCRIT % 29.2* 28.6*   PLATELETS 10*3/mm3 200 199         Results from last 7 days   Lab Units 11/24/23  0556 11/22/23  0550   MAGNESIUM mg/dL 1.7 2.2           Invalid input(s): \"LDLCALC\"      Results from last 7 days   Lab Units 11/21/23  0401   TSH uIU/mL 4.220*     I reviewed the patient's new clinical results.  I personally viewed and interpreted the patient's EKG  Current Medications:   Scheduled Meds:aspirin, 81 mg, Nasogastric, Daily  carvedilol, 25 mg, Nasogastric, Q12H  chlorhexidine, 15 mL, Mouth/Throat, Q12H  Delflex-LC/2.5% Dextrose, 2,000 mL, Intraperitoneal, 5 Exchanges Daily - Dwell Overnight  escitalopram, 10 mg, Nasogastric, Daily  First Mouthwash (Magic Mouthwash), 10 mL, Swish & Spit, Q6H  hydroxychloroquine, 200 mg, Oral, Daily  insulin regular, 2-7 Units, Subcutaneous, Q6H  Lacosamide, 100 mg, Intravenous, Q12H  lactulose, 10 g, Nasogastric, TID  lidocaine, 10 mL, Subcutaneous, Once  metoclopramide, 5 mg, Intravenous, Q6H  mupirocin, , Each Nare, BID  mycophenolate, 250 mg, Oral, Q12H  pantoprazole, 40 mg, Intravenous, Q AM      Continuous " Infusions:hold, 1 each        Allergies:  Allergies   Allergen Reactions    Minoxidil Hives and Other (See Comments)     Pericardial effusion .       Assessment:       Ascending aortic aneurysm Dissection/Severe AR- S/p Surgical repair and replacement with 26mm graft and repair of Aortic valve  ESRD on HD  SLE  Thrombocytopenia  Cardiac tamponade with reexploration and removal of compression RV clot  Encephalopathy- improving  Essential HtN- controlled on current regimen with Coreg  Right MCA CVA  Chronic Anemia  Chronic Immunosupression  Plan:           CAESAR Gottlieb  11/24/23  14:37 EST  Electronically signed by CAESAR Gottlieb, 11/24/23, 2:37 PM EST.

## 2023-11-24 NOTE — SIGNIFICANT NOTE
11/24/23 1109   OTHER   Discipline physical therapist   Rehab Time/Intention   Session Not Performed patient unavailable for treatment  (Pt receiving PD at bedside; will follow up as time allows.)   Recommendation   PT - Next Appointment 11/24/23

## 2023-11-24 NOTE — PROGRESS NOTES
" LOS: 29 days   Patient Care Team:  Margarita Woods APRN as PCP - General (Nurse Practitioner)  Winnie Sanchez MD as Referring Physician (Obstetrics and Gynecology)  Norberto Almaraz MD PhD as Consulting Physician (Hematology and Oncology)  Lupis Thomas MD as Consulting Physician (Nephrology)  Hair Mckeon MD as Consulting Physician (Nephrology)  Juana Taylor MD as Consulting Physician (Cardiology)    Chief Complaint: post op    Subjective:  Symptoms:  Stable.  No shortness of breath, cough or chest pain.    Diet:  Adequate intake (TF).  No nausea or vomiting.    Activity level: Impaired due to weakness.    Pain:  She reports no pain.      Asking for apple juice    Vital Signs  Temp:  [98.1 °F (36.7 °C)-98.2 °F (36.8 °C)] 98.1 °F (36.7 °C)  Heart Rate:  [87-92] 92  Resp:  [16] 16  BP: (117-125)/(86-93) 117/93  Body mass index is 17.26 kg/m².    Intake/Output Summary (Last 24 hours) at 11/24/2023 0821  Last data filed at 11/24/2023 0300  Gross per 24 hour   Intake 8753 ml   Output 9550 ml   Net -797 ml     No intake/output data recorded.          11/21/23  0500 11/22/23  0432 11/24/23  0500   Weight: 52.6 kg (115 lb 14.4 oz) 48.8 kg (107 lb 9.4 oz) 47 kg (103 lb 9.9 oz)     Objective:  General Appearance:  Comfortable and in no acute distress.    Vital signs: (most recent): Blood pressure 128/96, pulse 92, temperature 98.2 °F (36.8 °C), temperature source Oral, resp. rate 16, height 165 cm (64.96\"), weight 47 kg (103 lb 9.9 oz), last menstrual period 08/10/2022, SpO2 100%, not currently breastfeeding.  Vital signs are normal.  No fever.    Output: No urine output and producing stool.    Lungs:  Normal effort and normal respiratory rate.  There are decreased breath sounds.    Heart: Normal rate.  Regular rhythm.  (SR on tele monitor)  Abdomen: Abdomen is soft and non-distended.  Bowel sounds are normal.     Pulses: Distal pulses are intact.    Neurological: Patient is oriented to person, place " "and time.  (drowsy).    Skin:  Warm and dry.  (Sternal dressing clean, dry, and intact)          Results Review:        WBC WBC   Date Value Ref Range Status   11/24/2023 10.59 3.40 - 10.80 10*3/mm3 Final   11/23/2023 12.12 (H) 3.40 - 10.80 10*3/mm3 Final   11/22/2023 12.10 (H) 3.40 - 10.80 10*3/mm3 Final      HGB Hemoglobin   Date Value Ref Range Status   11/24/2023 9.6 (L) 12.0 - 15.9 g/dL Final   11/23/2023 9.5 (L) 12.0 - 15.9 g/dL Final   11/22/2023 10.1 (L) 12.0 - 15.9 g/dL Final      HCT Hematocrit   Date Value Ref Range Status   11/24/2023 29.2 (L) 34.0 - 46.6 % Final   11/23/2023 28.6 (L) 34.0 - 46.6 % Final   11/22/2023 30.1 (L) 34.0 - 46.6 % Final      Platelets Platelets   Date Value Ref Range Status   11/24/2023 200 140 - 450 10*3/mm3 Final   11/23/2023 199 140 - 450 10*3/mm3 Final   11/22/2023 235 140 - 450 10*3/mm3 Final        PT/INR:  No results found for: \"PROTIME\"/No results found for: \"INR\"    Sodium Sodium   Date Value Ref Range Status   11/24/2023 127 (L) 136 - 145 mmol/L Final   11/23/2023 127 (L) 136 - 145 mmol/L Final   11/22/2023 127 (L) 136 - 145 mmol/L Final      Potassium Potassium   Date Value Ref Range Status   11/24/2023 3.6 3.5 - 5.2 mmol/L Final   11/23/2023 3.3 (L) 3.5 - 5.2 mmol/L Final     Comment:     Slight hemolysis detected by analyzer. Result may be falsely elevated.   11/22/2023 4.0 3.5 - 5.2 mmol/L Final     Comment:     Slight hemolysis detected by analyzer. Result may be falsely elevated.   11/21/2023 3.6 3.5 - 5.2 mmol/L Final      Chloride Chloride   Date Value Ref Range Status   11/24/2023 87 (L) 98 - 107 mmol/L Final   11/23/2023 88 (L) 98 - 107 mmol/L Final   11/22/2023 88 (L) 98 - 107 mmol/L Final      Bicarbonate CO2   Date Value Ref Range Status   11/24/2023 29.8 (H) 22.0 - 29.0 mmol/L Final   11/23/2023 28.1 22.0 - 29.0 mmol/L Final   11/22/2023 27.5 22.0 - 29.0 mmol/L Final      BUN BUN   Date Value Ref Range Status   11/24/2023 49 (H) 6 - 20 mg/dL Final "   11/23/2023 40 (H) 6 - 20 mg/dL Final   11/22/2023 43 (H) 6 - 20 mg/dL Final      Creatinine Creatinine   Date Value Ref Range Status   11/24/2023 4.56 (H) 0.57 - 1.00 mg/dL Final   11/23/2023 4.98 (H) 0.57 - 1.00 mg/dL Final   11/22/2023 4.88 (H) 0.57 - 1.00 mg/dL Final      Calcium Calcium   Date Value Ref Range Status   11/24/2023 8.8 8.6 - 10.5 mg/dL Final   11/23/2023 8.5 (L) 8.6 - 10.5 mg/dL Final   11/22/2023 8.8 8.6 - 10.5 mg/dL Final      Magnesium Magnesium   Date Value Ref Range Status   11/24/2023 1.7 1.6 - 2.6 mg/dL Final   11/22/2023 2.2 1.6 - 2.6 mg/dL Final   11/21/2023 1.5 (L) 1.6 - 2.6 mg/dL Final          aspirin, 81 mg, Nasogastric, Daily  carvedilol, 25 mg, Nasogastric, Q12H  chlorhexidine, 15 mL, Mouth/Throat, Q12H  Delflex-LC/2.5% Dextrose (DIANEAL) 2,000 mL with heparin (porcine) 5000 UNIT/ML 2,000 Units dialysis solution, , Intraperitoneal, Q6H  Delflex-LC/2.5% Dextrose, 2,000 mL, Intraperitoneal, 5 Exchanges Daily - Dwell Overnight  escitalopram, 10 mg, Nasogastric, Daily  First Mouthwash (Magic Mouthwash), 10 mL, Swish & Spit, Q6H  hydroxychloroquine, 200 mg, Oral, Daily  insulin regular, 2-7 Units, Subcutaneous, Q6H  Lacosamide, 100 mg, Intravenous, Q12H  lactulose, 10 g, Nasogastric, TID  lidocaine, 10 mL, Subcutaneous, Once  metoclopramide, 5 mg, Intravenous, Q6H  mupirocin, , Each Nare, BID  mycophenolate, 250 mg, Oral, Q12H  pantoprazole, 40 mg, Intravenous, Q AM      hold, 1 each      Assessment & Plan  - Ascending aortic aneurysm with dissection- s/p ascending aortic dissection repair/proximal replacement, gacron interposition graft, kelli procedure; insertion of right femoral shiley---POD#22; Pagni  - Cardiac tamponade- reexploration for bleeding, removal of large clot compressing the RV---POD#18; Camporrotondo  - severe aortic valve insufficiency  - Encephalopathy, improving   - ESRD on PD  - Lupus--on Cellcept/plaquenil; currently held  - Epilepsy  - chronic immunosuppression  -  hypertension  - chronic anemia  - TCP--chronic; improving  - Depression--started on lexapro 11/14  - right MCA CVA--continue ASA per neuro     Remains withdrawn. Only speaks to tell me she wants juice  Still not taking much in by mouth. Remains on continuous tube feedings. Patient still refusing transition to HD/PEG placement.  Remains in NSR. BP stable on current regimen  On PD--managed per nephrology   Cellcept/plaquenil resumed yesterday   Paint sternal incision with betadine daily  She is very weak. Continue aggressive PT/OT  Continue supportive care    Katherine Caal, CAESAR  11/24/23  08:21 EST

## 2023-11-24 NOTE — THERAPY TREATMENT NOTE
Patient Name: Dee Herrera  : 1990    MRN: [id number]                              Today's Date: 2023       Admit Date: 10/26/2023    Visit Dx:     ICD-10-CM ICD-9-CM   1. Shortness of breath  R06.02 786.05   2. ESRD (end stage renal disease) on dialysis  N18.6 585.6    Z99.2 V45.11   3. Hyponatremia  E87.1 276.1   4. Generalized abdominal pain  R10.84 789.07   5. Elevated lactic acid level  R79.89 276.2   6. Elevated troponin  R79.89 790.6   7. Severe aortic valve regurgitation  I35.1 424.1   8. Pericardial effusion  I31.39 423.9   9. 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 syndrome    Systemic lupus erythematosus    Lupus nephritis, ISN/RPS class IV    Hypokalemia    Hypocalcemia    COVID-19    Hospital discharge follow-up    Stage 5 chronic kidney disease    Cardiac cirrhosis    Pancreatitis    Duodenitis    Regional enteritis of small bowel    Pericardial effusion    End stage renal disease on dialysis    Hemodialysis status    Seizure disorder    Elevated liver function tests    C. difficile colitis    Anemia, chronic disease    Essential hypertension    Peritoneal dialysis catheter in place    Anemia due to chronic kidney disease, on chronic dialysis    Alternating constipation and diarrhea    Abnormal stress test    Hyponatremia    Poor appetite    Moderate malnutrition    Chronic diastolic CHF (congestive heart failure)    Aortic valve lesion    Severe aortic valve regurgitation    Dissecting ascending aortic aneurysm    Recent cerebrovascular accident (CVA)     Past Medical History:   Diagnosis Date    Anasarca     PER CT SCAN    Dry skin      ESRD (end stage renal disease) on dialysis     MARCO COLE, KATIE CHAVIRA HWDRAKE    History of abdominal pain     History of anemia     History of transfusion     Hypertension     Iron deficiency anemia 09/27/2021    Lupus (systemic lupus erythematosus) 07/30/2022    Migraine     Other specified nutritional anemias     Pericardial effusion     Renal insufficiency     Seizures     STATES LAST WAS 1/2023    Shortness of breath     OCCASIONAL    Vitamin D deficiency 09/27/2021     Past Surgical History:   Procedure Laterality Date    ASCENDING AORTIC ANEURYSM REPAIR W/ MECHANICAL AORTIC VALVE REPLACEMENT N/A 11/2/2023    Procedure: CHETNA STERNOTOMY, AORTIC ROOT REPLACEMENT WITH VALVE SPARING MISHEL PROCEDURE, REPLACEMENT OF ASCENDING AORTA, RIGHT FEMORAL DIALYSIS CATHETER PLACEMENT AND PRP;  Surgeon: Chris Medina MD;  Location: [facility] CVOR;  Service: Cardiothoracic;  Laterality: N/A;    CARDIAC CATHETERIZATION N/A 06/14/2023    Procedure: Coronary angiography;  Surgeon: Juana Taylor MD;  Location: [facility] CATH INVASIVE LOCATION;  Service: Cardiovascular;  Laterality: N/A;    CARDIAC CATHETERIZATION N/A 06/14/2023    Procedure: Left heart cath;  Surgeon: Juana Taylor MD;  Location: [facility] CATH INVASIVE LOCATION;  Service: Cardiovascular;  Laterality: N/A;    CARDIAC CATHETERIZATION N/A 06/14/2023    Procedure: Right Heart Cath;  Surgeon: Juana Taylor MD;  Location: [facility] CATH INVASIVE LOCATION;  Service: Cardiovascular;  Laterality: N/A;    COLONOSCOPY N/A 7/20/2023    Procedure: COLONOSCOPY to cecum with biopsy;  Surgeon: Drew Kaminski MD;  Location: [facility] ENDOSCOPY;  Service: Gastroenterology;  Laterality: N/A;  PRE - diarrhea, constipation  POST - fair prep, normal    CORONARY ARTERY BYPASS GRAFT N/A 11/6/2023    Procedure: STERNAL EXPLORATION AND WASH OUT;  Surgeon: Jr Mitesh Quiroz MD;  Location: [facility] CVOR;  Service: Cardiothoracic;  Laterality: N/A;    ENDOSCOPY N/A 7/20/2023     Procedure: ESOPHAGOGASTRODUODENOSCOPY with biopsy;  Surgeon: Drew Kaminski MD;  Location: [facility] ENDOSCOPY;  Service: Gastroenterology;  Laterality: N/A;  PRE - abn ct abd  POST - gastritis    INSERTION HEMODIALYSIS CATHETER N/A 07/26/2022    Procedure: RIGHT TUNNELED DIALYSIS CATHETER PLACEMENT;  Surgeon: Diandra Adhikari MD;  Location: [facility] MAIN OR;  Service: Vascular;  Laterality: N/A;    INSERTION PERITONEAL DIALYSIS CATHETER N/A 04/03/2023    Procedure: INSERTION PERITONEAL DIALYSIS CATHETER LAPAROSCOPIC, omentumpexy;  Surgeon: Jemal Loyola MD;  Location: [facility] MAIN OR;  Service: General;  Laterality: N/A;    TONSILLECTOMY        General Information       Row Name 11/24/23 1530          Physical Therapy Time and Intention    Document Type therapy note (daily note)  -EE     Mode of Treatment physical therapy;individual therapy  -EE       Row Name 11/24/23 1530          General Information    Existing Precautions/Restrictions fall;sternal;cardiac  -EE     Barriers to Rehab medically complex;previous functional deficit  -EE       Row Name 11/24/23 1530          Cognition    Orientation Status (Cognition) oriented x 3  -EE       Row Name 11/24/23 1530          Safety Issues, Functional Mobility    Impairments Affecting Function (Mobility) balance;endurance/activity tolerance;postural/trunk control;strength  -EE               User Key  (r) = Recorded By, (t) = Taken By, (c) = Cosigned By      Initials Name Provider Type    EE Shea Ceja PT Physical Therapist                   Mobility       Row Name 11/24/23 1531          Bed Mobility    Rolling Right [place] (Bed Mobility) standby assist;verbal cues  -EE     Scooting/Bridging [place] (Bed Mobility) standby assist;verbal cues  -EE     Supine-Sit [place] (Bed Mobility) minimum assist (75% patient effort);verbal cues  -EE     Sit-Supine [place] (Bed Mobility) minimum assist (75% patient effort);verbal cues  -EE     Comment,  (Bed Mobility) supine <> sit performed x2 trials  -EE       Row Name 11/24/23 1531          Bed-Chair Transfer    Bed-Chair [place] (Transfers) minimum assist (75% patient effort);moderate assist (50% patient effort);verbal cues  HHA  -EE     Comment, (Bed-Chair Transfer) stand pivot from bed > chair  -EE       Row Name 11/24/23 1531          Sit-Stand Transfer    Sit-Stand [place] (Transfers) minimum assist (75% patient effort);verbal cues  HHA  -EE               User Key  (r) = Recorded By, (t) = Taken By, (c) = Cosigned By      Initials Name Provider Type    EE Shea Ceja, PT Physical Therapist                   Obj/Interventions       Row Name 11/24/23 1531          Motor Skills    Therapeutic Exercise --  Seated B ankle pumps x 10 reps. Seated B LAQ, B shoulder flexion x5 reps. Seated B cervical rotation AAROM and cervical flexion/extension AAROM x5 reps.  -EE       Row Name 11/24/23 1531          Balance    Balance Assessment sitting static balance;standing static balance  -EE     Static Sitting Balance standby assist  -EE     Position, Sitting Balance unsupported;sitting edge of bed  -EE     Static Standing Balance contact guard  -EE     Dynamic Standing Balance minimal assist  -EE     Position/Device Used, Standing Balance supported  -EE     Comment, Balance Sat EOB x 12 min total  -EE               User Key  (r) = Recorded By, (t) = Taken By, (c) = Cosigned By      Initials Name Provider Type    EE Shea Ceja, PT Physical Therapist                   Goals/Plan    No documentation.                  Clinical Impression       Row Name 11/24/23 1533          Pain    Pretreatment Pain Rating 0/10 - no pain  -EE     Posttreatment Pain Rating 0/10 - no pain  -EE       [facility] Name 11/24/23 1533          Plan of Care Review    Plan of Care Reviewed With patient  -EE     Progress improving  -EE     Outcome Evaluation Pt agreeable to PT and demonstrates improved activity tolerance as well as increased  independence with mobility. Pt able to perform bed mobility with min A, and stand pivot transfer to chair with min/mod A. Pt completed seated exercises with cues and will continue to benefit from PT for ongoing strengthening and advancement of mobility.  -EE       Row Name 11/24/23 1533          Therapy Assessment/Plan (PT)    Therapy Frequency (PT) 5 times/wk  -EE       Row Name 11/24/23 1533          Positioning and Restraints    Pre-Treatment Position in bed  -EE     Post Treatment Position chair  -EE     In Chair reclined;call light within reach;encouraged to call for assist;exit alarm on;with family/caregiver;notified nsg;legs elevated  -EE               User Key  (r) = Recorded By, (t) = Taken By, (c) = Cosigned By      Initials Name Provider Type    Shea Nicholas PT Physical Therapist                   Outcome Measures       Row Name 11/24/23 1535          How much help from another person do you currently need...    Turning from your back to your side while in flat bed without using bedrails? 3  -EE     Moving from lying on back to sitting on the side of a flat bed without bedrails? 3  -EE     Moving to and from a bed to a chair (including a wheelchair)? 2  -EE     Standing up from a chair using your arms (e.g., wheelchair, bedside chair)? 3  -EE     Climbing 3-5 steps with a railing? 1  -EE     To walk in hospital room? 2  -EE     AM-PAC 6 Clicks Score (PT) 14  -EE     Highest Level of Mobility Goal 4 --> Transfer to chair/commode  -EE               User Key  (r) = Recorded By, (t) = Taken By, (c) = Cosigned By      Initials Name Provider Type    Shea Nicholas PT Physical Therapist                                 Physical Therapy Education       Title: PT OT SLP Therapies (In Progress)       Topic: Physical Therapy (Done)       Point: Mobility training (Done)       Learning Progress Summary             Patient Acceptance, E,TB,D, VU,NR by ELICIA at 11/24/2023 1535    Acceptance, E, VU by SK at 11/21/2023  1609    Acceptance, E,TB,D, VU by SK at 11/20/2023 1237    Acceptance, E, NR by  at 11/19/2023 1424    Acceptance, E,TB,D, VU by SK at 11/17/2023 1633    Acceptance, E,D, NR by  at 11/16/2023 1618    Acceptance, E,TB,D, VU by SK at 11/15/2023 1521    Acceptance, E, VU,NR by SK at 11/13/2023 1247    Acceptance, E,TB,D, VU,NR by  at 11/12/2023 1148   Family Acceptance, E, VU by SK at 11/21/2023 1609                         Point: Home exercise program (Done)       Learning Progress Summary             Patient Acceptance, E,TB,D, VU,NR by  at 11/24/2023 1535    Acceptance, E, VU by SK at 11/21/2023 1609    Acceptance, E,TB,D, VU by SK at 11/20/2023 1237    Acceptance, E, NR by  at 11/19/2023 1424    Acceptance, E,TB,D, VU by SK at 11/17/2023 1633    Acceptance, E,D, NR by  at 11/16/2023 1618    Acceptance, E,TB,D, VU by SK at 11/15/2023 1521    Acceptance, E,TB,D, VU,NR by  at 11/12/2023 1148   Family Acceptance, E, VU by SK at 11/21/2023 1609                         Point: Body mechanics (Done)       Learning Progress Summary             Patient Acceptance, E,TB,D, VU,NR by  at 11/24/2023 1535    Acceptance, E, VU by SK at 11/21/2023 1609    Acceptance, E,TB,D, VU by SK at 11/20/2023 1237    Acceptance, E, NR by  at 11/19/2023 1424    Acceptance, E,TB,D, VU by SK at 11/17/2023 1633    Acceptance, E,D, NR by  at 11/16/2023 1618    Acceptance, E,TB,D, VU by SK at 11/15/2023 1521    Acceptance, E, VU,NR by SK at 11/13/2023 1247    Acceptance, E,TB,D, VU,NR by  at 11/12/2023 1148   Family Acceptance, E, VU by SK at 11/21/2023 1609                         Point: Precautions (Done)       Learning Progress Summary             Patient Acceptance, E, VU by SK at 11/21/2023 1609    Acceptance, E,TB,D, VU by SK at 11/20/2023 1237    Acceptance, E, NR by  at 11/19/2023 1424    Acceptance, E,TB,D, VU by SK at 11/17/2023 1633    Acceptance, E,D, NR by  at 11/16/2023 1618    Acceptance, E,TB,D, VU by SK at  11/15/2023 1521    Acceptance, E, VU,NR by SK at 11/13/2023 1247    Acceptance, E,TB,D, VU,NR by  at 11/12/2023 1148   Family Acceptance, E, VU by SK at 11/21/2023 1609                                         User Key       Initials Effective Dates Name Provider Type Discipline     06/16/21 -  Kim Almendarez, PT Physical Therapist PT    PC 06/16/21 -  Elba Whaley, PT Physical Therapist PT     06/16/21 -  Shea Ceja, PT Physical Therapist PT     08/25/23 -  Nae Chaudhary, RN Registered Nurse Nurse    SK 10/10/23 -  Supriya Harris, HYUN Student PT Student PT                  PT Recommendation and Plan     Plan of Care Reviewed With: patient  Progress: improving  Outcome Evaluation: Pt agreeable to PT and demonstrates improved activity tolerance as well as increased independence with mobility. Pt able to perform bed mobility with min A, and stand pivot transfer to chair with min/mod A. Pt completed seated exercises with cues and will continue to benefit from PT for ongoing strengthening and advancement of mobility.     Time Calculation:         PT Charges       Row Name 11/24/23 1535 11/24/23 1109          Time Calculation    Start Time 1439  -EE --     Stop Time 1503  -EE --     Time Calculation (min) 24 min  -EE --     PT Received On 11/24/23  -EE --     PT - Next Appointment 11/27/23  -EE 11/24/23  -EE        Time Calculation- PT    Total Timed Code Minutes- PT 24 minute(s)  -EE --        Timed Charges    78015 - PT Therapeutic Exercise Minutes 14  -EE --     02986 - PT Therapeutic Activity Minutes 10  -EE --        Total Minutes    Timed Charges Total Minutes 24  -EE --      Total Minutes 24  -EE --               User Key  (r) = Recorded By, (t) = Taken By, (c) = Cosigned By      Initials Name Provider Type     Shea Ceja, PT Physical Therapist                  Therapy Charges for Today       Code Description Service Date Service Provider Modifiers Qty    81382792217  PT THER PROC EA 15  MIN 11/24/2023 Shea Ceja, PT GP 1    [id number]  PT THERAPEUTIC ACT EA 15 MIN 11/24/2023 Shea Ceja, PT GP 1            PT G-Codes  Outcome Measure Options: AM-PAC 6 Clicks Basic Mobility (PT)  AM-PAC 6 Clicks Score (PT): 14  AM-PAC 6 Clicks Score (OT): 8  Modified [place] Scale: 4 - Moderately severe disability.  Unable to walk without assistance, and unable to attend to own bodily needs without assistance.  PT Discharge Summary  Anticipated Discharge Disposition (PT): skilled nursing facility, inpatient rehabilitation facility (pending progress)    Shea Ceja PT  11/24/2023

## 2023-11-24 NOTE — PLAN OF CARE
Goal Outcome Evaluation:  Plan of Care Reviewed With: patient        Progress: improving  Outcome Evaluation: Pt agreeable to PT and demonstrates improved activity tolerance as well as increased independence with mobility. Pt able to perform bed mobility with min A, and stand pivot transfer to chair with min/mod A. Pt completed seated exercises with cues and will continue to benefit from PT for ongoing strengthening and advancement of mobility.      Anticipated Discharge Disposition (PT): skilled nursing facility, inpatient rehabilitation facility (pending progress)

## 2023-11-24 NOTE — NURSING NOTE
AC follow up regarding depression: chart reviewed. Pt has continued to deny OP recommendations regarding depression and does not endorse depressive s/s despite flat affect and low motivation. Access is continuing to follow for support.

## 2023-11-24 NOTE — PROGRESS NOTES
[facility] Progress Note      Patient Name: Dee Herrera  : 1990  MRN: [id number]  Primary Care Physician:  Margarita Woods APRN  Date of admission: 10/26/2023    Subjective     Interval History:   F/u ESRD     Review of Systems:   PD UF more effective with heparin in bags  Patient is not speaking to me at all this AM    Objective     Vitals:   Temp:  [98.1 °F (36.7 °C)-99.1 °F (37.3 °C)] 98.1 °F (36.7 °C)  Heart Rate:  [85-92] 92  Resp:  [16] 16  BP: (117-140)/(86-95) 117/93    Intake/Output Summary (Last 24 hours) at 2023 0720  Last data filed at 2023 0300  Gross per 24 hour   Intake 65432 ml   Output 73760 ml   Net -797 ml       Physical Exam:    General Appearance: frail cachectic AAF nonverbal currently +DHT  Neck: supple, no JVD  Lungs: CTA bilat no rales  Heart: RRR, normal S1 and S2  Abdomen: soft, nontender, nondistended  Extremities: no edema, cyanosis or clubbing      Scheduled Meds:     aspirin, 81 mg, Nasogastric, Daily  carvedilol, 25 mg, Nasogastric, Q12H  chlorhexidine, 15 mL, Mouth/Throat, Q12H  Delflex-LC/2.5% Dextrose (DIANEAL) 2,000 mL with heparin (porcine) 5000 UNIT/ML 2,000 Units dialysis solution, , Intraperitoneal, Q6H  Delflex-LC/2.5% Dextrose, 2,000 mL, Intraperitoneal, 5 Exchanges Daily - Dwell Overnight  escitalopram, 10 mg, Nasogastric, Daily  First Mouthwash (Magic Mouthwash), 10 mL, Swish & Spit, Q6H  hydroxychloroquine, 200 mg, Oral, Daily  insulin regular, 2-7 Units, Subcutaneous, Q6H  Lacosamide, 100 mg, Intravenous, Q12H  lactulose, 10 g, Nasogastric, TID  lidocaine, 10 mL, Subcutaneous, Once  metoclopramide, 5 mg, Intravenous, Q6H  mupirocin, , Each Nare, BID  mycophenolate, 250 mg, Oral, Q12H  pantoprazole, 40 mg, Intravenous, Q AM      IV Meds:   hold, 1 each        Results Reviewed:   I have personally reviewed the results from the time of this admission to 2023 07:20 EST     Results from last 7 days   Lab Units  11/24/23  0556 11/23/23  0428 11/22/23  0550 11/19/23  0308 11/18/23  0330   SODIUM mmol/L 127* 127* 127*   < > 130*   POTASSIUM mmol/L 3.6 3.3* 4.0   < > 3.1*   CHLORIDE mmol/L 87* 88* 88*   < > 94*   CO2 mmol/L 29.8* 28.1 27.5   < > 26.0   BUN mg/dL 49* 40* 43*   < > 29*   CREATININE mg/dL 4.56* 4.98* 4.88*   < > 4.64*   CALCIUM mg/dL 8.8 8.5* 8.8   < > 8.6   BILIRUBIN mg/dL  --   --   --   --  0.2   ALK PHOS U/L  --   --   --   --  74   ALT (SGPT) U/L  --   --   --   --  21   AST (SGOT) U/L  --   --   --   --  34*   GLUCOSE mg/dL 107* 120* 112*   < > 158*    < > = values in this interval not displayed.     Estimated Creatinine Clearance: 13.3 mL/min (A) (by C-G formula based on SCr of 4.56 mg/dL (H)).  Results from last 7 days   Lab Units 11/24/23  0556 11/23/23  0428 11/22/23  0550 11/21/23  1718   MAGNESIUM mg/dL 1.7  --  2.2 1.5*   PHOSPHORUS mg/dL 2.6 3.7 3.7  --          Results from last 7 days   Lab Units 11/24/23  0556 11/23/23  0428 11/22/23  0550 11/21/23  0713 11/20/23  0317   WBC 10*3/mm3 10.59 12.12* 12.10* 7.95 8.30   HEMOGLOBIN g/dL 9.6* 9.5* 10.1* 9.9* 9.1*   PLATELETS 10*3/mm3 200 199 235 210 201           Assessment / Plan     ASSESSMENT:  ESRD.  Currently on peritoneal dialysis.  More effective UF yesterday with addition of heparin.  Plan was to transition back to IHD upon placing PEG to support nutrition but she's refusing this.  Mild hypervolemic hyponatremia, Na 127.  Won't switch back to 4.25% exchanges yet, became hypotensive with this earlier in week.  K repleted  2.  Acute CVA cortical infarct in the anterior inferior medial right frontal lobe.  Subacute subdural hematoma right occipital.  3.  Ascending aortic aneurysm with subacute/chronic aortic root dissection.  Status post repair of proximal aortic aneurysm 11/2/2023.  Re- exploration for cardiac tamponade 11/6/2023.  4.  Seizure disorder currently on Vimpat  5.  Failure to thrive.  Nocturnal TFs.  Scant oral intake.  Refuses PEG.   Alb 2  6.  SLE.  C3 mildly low, C4 normal.  Anti-double-stranded DNA antibody elevated 11/20/2023.  Plaquenil and CellCept resumed   7.  Anemia CKD, hgb stable 9.6  8.  Depression - access center following, on lexapro   9.  HTN - BP adequate, have scaled back regimen    PLAN:  Continue PD regimen  Will not interact with me today  Still refuses PEG & IHD transition    Prognosis very poor and palliative care following      Lance Martínez MD  11/24/23  07:20 [facility]    [facility]  [phone]

## 2023-11-24 NOTE — PROGRESS NOTES
" LOS: 29 days     Name: Dee Herrera  Age: 33 y.o.  Sex: female  :  1990  MRN: [id number]         Primary Care Physician: Margarita Woods APRN    Subjective   Subjective  Sitting up in the bed feeding herself juice.  Nursing notes reports that she ate 50% of her lunch yesterday but no other meals.  She is minimally interactive with me today.  Will nod her head yes in agreement with what I am telling her which includes her dire situation regarding nutrition and refusal of PEG tube placement.    Objective   Vital Signs  Temp:  [98.1 °F (36.7 °C)-98.2 °F (36.8 °C)] 98.2 °F (36.8 °C)  Heart Rate:  [87-92] 92  Resp:  [16] 16  BP: (117-128)/(86-96) 128/96  Body mass index is 17.26 kg/m².    Objective:  General Appearance:  Comfortable, in no acute distress and ill-appearing.    Vital signs: (most recent): Blood pressure 128/96, pulse 92, temperature 98.2 °F (36.8 °C), temperature source Oral, resp. rate 16, height 165 cm (64.96\"), weight 47 kg (103 lb 9.9 oz), last menstrual period 08/10/2022, SpO2 100%, not currently breastfeeding.    Lungs:  Normal effort and normal respiratory rate.  She is not in respiratory distress.  There are decreased breath sounds.    Heart: Normal rate.  Regular rhythm.    Abdomen: Abdomen is soft.  Bowel sounds are normal.   There is no abdominal tenderness.     Extremities: There is no dependent edema or local swelling.    Neurological: Patient is alert.  (Minimally interactive.).    Skin:  Warm and dry.                Results Review:       I reviewed the patient's new clinical results.    Results from last 7 days   Lab Units 23  0556 23  0428 23  0550 23  0713 23  0317 23  0308 23  0330   WBC 10*3/mm3 10.59 12.12* 12.10* 7.95 8.30 9.43 10.96*   HEMOGLOBIN g/dL 9.6* 9.5* 10.1* 9.9* 9.1* 9.5* 9.1*   PLATELETS 10*3/mm3 200 199 235 210 201 180 172     Results from last 7 days   Lab Units 23  0556 23  0428 23  0550 " 23  1718 23  0401 23  0317 23  0308 23  0330   SODIUM mmol/L 127* 127* 127*  --  128* 130* 128* 130*   POTASSIUM mmol/L 3.6 3.3* 4.0 3.6 3.7 4.0 3.9 3.1*   CHLORIDE mmol/L 87* 88* 88*  --  90* 91* 92* 94*   CO2 mmol/L 29.8* 28.1 27.5  --  25.1 24.9 25.1 26.0   BUN mg/dL 49* 40* 43*  --  35* 32* 27* 29*   CREATININE mg/dL 4.56* 4.98* 4.88*  --  5.37* 5.01* 4.50* 4.64*   CALCIUM mg/dL 8.8 8.5* 8.8  --  8.6 8.4* 8.2* 8.6   GLUCOSE mg/dL 107* 120* 112*  --  120* 94 107* 158*                 Scheduled Meds:   aspirin, 81 mg, Nasogastric, Daily  carvedilol, 25 mg, Nasogastric, Q12H  chlorhexidine, 15 mL, Mouth/Throat, Q12H  Delflex-LC/2.5% Dextrose (DIANEAL) 2,000 mL with heparin (porcine) 5000 UNIT/ML 2,000 Units dialysis solution, , Intraperitoneal, Q6H  Delflex-LC/2.5% Dextrose, 2,000 mL, Intraperitoneal, 5 Exchanges Daily - Dwell Overnight  escitalopram, 10 mg, Nasogastric, Daily  First Mouthwash (Magic Mouthwash), 10 mL, Swish & Spit, Q6H  hydroxychloroquine, 200 mg, Oral, Daily  insulin regular, 2-7 Units, Subcutaneous, Q6H  Lacosamide, 100 mg, Intravenous, Q12H  lactulose, 10 g, Nasogastric, TID  lidocaine, 10 mL, Subcutaneous, Once  metoclopramide, 5 mg, Intravenous, Q6H  mupirocin, , Each Nare, BID  mycophenolate, 250 mg, Oral, Q12H  pantoprazole, 40 mg, Intravenous, Q AM      PRN Meds:     acetaminophen **OR** acetaminophen **OR** acetaminophen    bisacodyl    bisacodyl    dextrose    dextrose    glucagon (human recombinant)    heparin (porcine)    hold    hydrALAZINE    ipratropium-albuterol    [] Morphine **AND** naloxone    nitroglycerin    ondansetron    polyethylene glycol  Continuous Infusions:  hold, 1 each        Assessment & Plan   Active Hospital Problems    Diagnosis  POA    **Dissecting ascending aortic aneurysm [I71.010]  Yes    Recent cerebrovascular accident (CVA) [Z86.73]  Yes    Aortic valve lesion [I35.8]  Yes    Severe aortic valve regurgitation [I35.1]   Yes    Hyponatremia [E87.1]  Yes    Poor appetite [R63.0]  Yes    Moderate malnutrition [E44.0]  Yes    Chronic diastolic CHF (congestive heart failure) [I50.32]  Yes    Anemia due to chronic kidney disease, on chronic dialysis [N18.6, D63.1, Z99.2]  Not Applicable    Peritoneal dialysis catheter in place [Z99.2]  Not Applicable    Essential hypertension [I10]  Yes    End stage renal disease on dialysis [N18.6, Z99.2]  Not Applicable    Seizure disorder [G40.909]  Yes    Elevated liver function tests [R79.89]  Yes    Pericardial effusion [I31.39]  Yes    Systemic lupus erythematosus [M32.9]  Yes    Thrombocytopenia [D69.6]  Yes    Hypertension secondary to other renal disorders [I15.1]  Yes    Pancytopenia [D61.818]  Yes    Vitamin D deficiency [E55.9]  Yes      Resolved Hospital Problems    Diagnosis Date Resolved POA    Abdominal pain [R10.9] 10/28/2023 Yes       Assessment & Plan    -Oral intake and nutrition status remained poor.  Continue continuous tube feeds.  Vomiting has resolved. Continue lactulose.  Continue Reglan.  -Neurology has provided recommendations regarding her acute CVA.  Currently on low-dose aspirin.  On Vimpat for seizures.  -Keppra discontinued given possible effects on her mood.  Remains on Lexapro.  -She remains very withdrawn but denies depression.  Psychiatry did not have any specific recommendations.  -Continues on peritoneal dialysis as per nephrology.   -CellCept and Plaquenil have been restarted  -Blood sugars well controlled.  Continue low-dose sliding scale insulin  -Continue therapy services     Dispo  To be determined     Her prognosis is guarded and looking quite poor at this point in time given her ongoing refusal to pursue PEG tube.  Palliative care now following.      Expected Discharge Date: 11/28/2023; Expected Discharge Time:      Bobby Carter MD  [facility]  11/24/23  10:15 EST

## 2023-11-25 LAB
ALBUMIN SERPL-MCNC: 2 G/DL (ref 3.5–5.2)
ANION GAP SERPL CALCULATED.3IONS-SCNC: 11.5 MMOL/L (ref 5–15)
BASOPHILS # BLD AUTO: 0.03 10*3/MM3 (ref 0–0.2)
BASOPHILS NFR BLD AUTO: 0.3 % (ref 0–1.5)
BUN SERPL-MCNC: 48 MG/DL (ref 6–20)
BUN/CREAT SERPL: 10 (ref 7–25)
CALCIUM SPEC-SCNC: 8.7 MG/DL (ref 8.6–10.5)
CHLORIDE SERPL-SCNC: 86 MMOL/L (ref 98–107)
CO2 SERPL-SCNC: 28.5 MMOL/L (ref 22–29)
CREAT SERPL-MCNC: 4.78 MG/DL (ref 0.57–1)
DEPRECATED RDW RBC AUTO: 43.6 FL (ref 37–54)
EGFRCR SERPLBLD CKD-EPI 2021: 11.8 ML/MIN/1.73
EOSINOPHIL # BLD AUTO: 0.21 10*3/MM3 (ref 0–0.4)
EOSINOPHIL NFR BLD AUTO: 2.4 % (ref 0.3–6.2)
ERYTHROCYTE [DISTWIDTH] IN BLOOD BY AUTOMATED COUNT: 14.2 % (ref 12.3–15.4)
GLUCOSE BLDC GLUCOMTR-MCNC: 104 MG/DL (ref 70–130)
GLUCOSE BLDC GLUCOMTR-MCNC: 119 MG/DL (ref 70–130)
GLUCOSE BLDC GLUCOMTR-MCNC: 123 MG/DL (ref 70–130)
GLUCOSE BLDC GLUCOMTR-MCNC: 126 MG/DL (ref 70–130)
GLUCOSE BLDC GLUCOMTR-MCNC: 141 MG/DL (ref 70–130)
GLUCOSE SERPL-MCNC: 106 MG/DL (ref 65–99)
HCT VFR BLD AUTO: 28.1 % (ref 34–46.6)
HGB BLD-MCNC: 9.4 G/DL (ref 12–15.9)
IMM GRANULOCYTES # BLD AUTO: 0.07 10*3/MM3 (ref 0–0.05)
IMM GRANULOCYTES NFR BLD AUTO: 0.8 % (ref 0–0.5)
LYMPHOCYTES # BLD AUTO: 0.48 10*3/MM3 (ref 0.7–3.1)
LYMPHOCYTES NFR BLD AUTO: 5.5 % (ref 19.6–45.3)
MCH RBC QN AUTO: 28 PG (ref 26.6–33)
MCHC RBC AUTO-ENTMCNC: 33.5 G/DL (ref 31.5–35.7)
MCV RBC AUTO: 83.6 FL (ref 79–97)
MONOCYTES # BLD AUTO: 1.03 10*3/MM3 (ref 0.1–0.9)
MONOCYTES NFR BLD AUTO: 11.9 % (ref 5–12)
NEUTROPHILS NFR BLD AUTO: 6.87 10*3/MM3 (ref 1.7–7)
NEUTROPHILS NFR BLD AUTO: 79.1 % (ref 42.7–76)
NRBC BLD AUTO-RTO: 0 /100 WBC (ref 0–0.2)
PHOSPHATE SERPL-MCNC: 2.7 MG/DL (ref 2.5–4.5)
PLATELET # BLD AUTO: 182 10*3/MM3 (ref 140–450)
PMV BLD AUTO: 12.2 FL (ref 6–12)
POTASSIUM SERPL-SCNC: 3.5 MMOL/L (ref 3.5–5.2)
QT INTERVAL: 400 MS
QTC INTERVAL: 470 MS
RBC # BLD AUTO: 3.36 10*6/MM3 (ref 3.77–5.28)
SODIUM SERPL-SCNC: 126 MMOL/L (ref 136–145)
WBC NRBC COR # BLD AUTO: 8.69 10*3/MM3 (ref 3.4–10.8)

## 2023-11-25 PROCEDURE — 25010000002 METOCLOPRAMIDE PER 10 MG: Performed by: INTERNAL MEDICINE

## 2023-11-25 PROCEDURE — 93005 ELECTROCARDIOGRAM TRACING: CPT | Performed by: INTERNAL MEDICINE

## 2023-11-25 PROCEDURE — 80069 RENAL FUNCTION PANEL: CPT | Performed by: INTERNAL MEDICINE

## 2023-11-25 PROCEDURE — 85025 COMPLETE CBC W/AUTO DIFF WBC: CPT | Performed by: INTERNAL MEDICINE

## 2023-11-25 PROCEDURE — 82948 REAGENT STRIP/BLOOD GLUCOSE: CPT

## 2023-11-25 PROCEDURE — 25010000002 LACOSAMIDE 200 MG/20ML SOLUTION: Performed by: NURSE PRACTITIONER

## 2023-11-25 PROCEDURE — 63710000001 MYCOPHENOLATE MOFETIL PER 250 MG: Performed by: NURSE PRACTITIONER

## 2023-11-25 PROCEDURE — 99231 SBSQ HOSP IP/OBS SF/LOW 25: CPT | Performed by: STUDENT IN AN ORGANIZED HEALTH CARE EDUCATION/TRAINING PROGRAM

## 2023-11-25 PROCEDURE — 93010 ELECTROCARDIOGRAM REPORT: CPT | Performed by: INTERNAL MEDICINE

## 2023-11-25 PROCEDURE — C9254 INJECTION, LACOSAMIDE: HCPCS | Performed by: NURSE PRACTITIONER

## 2023-11-25 RX ORDER — MIDODRINE HYDROCHLORIDE 2.5 MG/1
2.5 TABLET ORAL
Status: DISCONTINUED | OUTPATIENT
Start: 2023-11-25 | End: 2023-11-30

## 2023-11-25 RX ORDER — DEXTROSE MONOHYDRATE, SODIUM CHLORIDE, SODIUM LACTATE, CALCIUM CHLORIDE, MAGNESIUM CHLORIDE 2.5; 538; 448; 18.4; 5.08 G/100ML; MG/100ML; MG/100ML; MG/100ML; MG/100ML
2000 SOLUTION INTRAPERITONEAL
Status: DISCONTINUED | OUTPATIENT
Start: 2023-11-25 | End: 2023-11-27

## 2023-11-25 RX ADMIN — PANTOPRAZOLE SODIUM 40 MG: 40 INJECTION, POWDER, FOR SOLUTION INTRAVENOUS at 06:25

## 2023-11-25 RX ADMIN — CARBIDOPA AND LEVODOPA 2.5 MG: 50; 200 TABLET, EXTENDED RELEASE ORAL at 17:39

## 2023-11-25 RX ADMIN — METOCLOPRAMIDE 5 MG: 5 INJECTION, SOLUTION INTRAMUSCULAR; INTRAVENOUS at 17:39

## 2023-11-25 RX ADMIN — HYDROXYCHLOROQUINE SULFATE 200 MG: 200 TABLET ORAL at 08:56

## 2023-11-25 RX ADMIN — LACOSAMIDE 100 MG: 10 INJECTION INTRAVENOUS at 03:41

## 2023-11-25 RX ADMIN — DEXTROSE MONOHYDRATE, SODIUM CHLORIDE, SODIUM LACTATE, CALCIUM CHLORIDE, MAGNESIUM CHLORIDE 2000 ML: 2.5; 538; 448; 18.4; 5.08 SOLUTION INTRAPERITONEAL at 10:12

## 2023-11-25 RX ADMIN — ESCITALOPRAM OXALATE 10 MG: 10 TABLET, FILM COATED ORAL at 08:56

## 2023-11-25 RX ADMIN — DEXTROSE MONOHYDRATE, SODIUM CHLORIDE, SODIUM LACTATE, CALCIUM CHLORIDE, MAGNESIUM CHLORIDE 2000 ML: 2.5; 538; 448; 18.4; 5.08 SOLUTION INTRAPERITONEAL at 14:06

## 2023-11-25 RX ADMIN — METOCLOPRAMIDE 5 MG: 5 INJECTION, SOLUTION INTRAMUSCULAR; INTRAVENOUS at 02:05

## 2023-11-25 RX ADMIN — ASPIRIN 81 MG: 81 TABLET, CHEWABLE ORAL at 08:56

## 2023-11-25 RX ADMIN — METOCLOPRAMIDE 5 MG: 5 INJECTION, SOLUTION INTRAMUSCULAR; INTRAVENOUS at 12:54

## 2023-11-25 RX ADMIN — METOCLOPRAMIDE 5 MG: 5 INJECTION, SOLUTION INTRAMUSCULAR; INTRAVENOUS at 06:25

## 2023-11-25 RX ADMIN — CARVEDILOL 25 MG: 25 TABLET, FILM COATED ORAL at 22:19

## 2023-11-25 RX ADMIN — DEXTROSE MONOHYDRATE, SODIUM CHLORIDE, SODIUM LACTATE, CALCIUM CHLORIDE, MAGNESIUM CHLORIDE 2000 ML: 2.5; 538; 448; 18.4; 5.08 SOLUTION INTRAPERITONEAL at 06:24

## 2023-11-25 RX ADMIN — DEXTROSE MONOHYDRATE, SODIUM CHLORIDE, SODIUM LACTATE, CALCIUM CHLORIDE, MAGNESIUM CHLORIDE 2000 ML: 2.5; 538; 448; 18.4; 5.08 SOLUTION INTRAPERITONEAL at 20:37

## 2023-11-25 RX ADMIN — LACOSAMIDE 100 MG: 10 INJECTION INTRAVENOUS at 14:50

## 2023-11-25 RX ADMIN — ACETAMINOPHEN 650 MG: 325 TABLET, FILM COATED ORAL at 11:11

## 2023-11-25 RX ADMIN — CARVEDILOL 25 MG: 25 TABLET, FILM COATED ORAL at 08:56

## 2023-11-25 NOTE — PROGRESS NOTES
"General Surgery Progress Note    CC: malnutrition    S: I came to check on the patient and discussed with her her clinical course.  She has been having cramps in her abdomen which she thinks is related to her tube feeds and PD. She is adamant about not wanting a PEG tube.    O:BP 95/63   Pulse 85   Temp 98.2 °F (36.8 °C) (Oral)   Resp 16   Ht 165 cm (64.96\")   Wt 46.9 kg (103 lb 4.8 oz)   LMP 08/10/2022 (Approximate)   SpO2 93%   BMI 17.21 kg/m²     Intake & Output (last day)         11/24 0701 11/25 0700 11/25 0701 11/26 0700    P.O.  306    Other      NG/GT  191    Peritoneal Dialysis Volume In 8000 6000    Total Intake(mL/kg) 8000 (170.6) 6497 (138.5)    Stool 0     Dialysis 9100 6500    Total Output 9100 6500    Net -1100 -3          Stool Unmeasured Occurrence 1 x             GENERAL: awake, alert, answers questions appropriately  HEENT: EOMI; NGT in place with tube feeds running at 35cc/h  CHEST: normal work of breathing   ABDOMEN: soft, distended, nontender    LABS  Results from last 7 days   Lab Units 11/25/23  0552 11/24/23  0556 11/23/23 0428 11/22/23  0550 11/21/23  0713   WBC 10*3/mm3 8.69 10.59 12.12*   < > 7.95   HEMOGLOBIN g/dL 9.4* 9.6* 9.5*   < > 9.9*   HEMATOCRIT % 28.1* 29.2* 28.6*   < > 30.3*   PLATELETS 10*3/mm3 182 200 199   < > 210   MONOCYTES % %  --   --   --   --  8.0   EOSINOPHIL % %  --   --   --   --  3.0    < > = values in this interval not displayed.     Results from last 7 days   Lab Units 11/25/23 0552 11/24/23  0556 11/23/23 0428   SODIUM mmol/L 126* 127* 127*   POTASSIUM mmol/L 3.5 3.6 3.3*   CHLORIDE mmol/L 86* 87* 88*   CO2 mmol/L 28.5 29.8* 28.1   BUN mg/dL 48* 49* 40*   CREATININE mg/dL 4.78* 4.56* 4.98*   CALCIUM mg/dL 8.7 8.8 8.5*   GLUCOSE mg/dL 106* 107* 120*             A/P: 33 y.o. female with with multiple medical problems including ESRD on peritoneal dialysis, presenting s/p ascending aortic aneurysm repair with poor oral intake and malnutrition.  "     Patient continues to undergo feeds per NGT and eating intermittently. She appears to be tolerating enteral nutrition, and since she is still adamant about not wanting a PEG tube, would continue supplementation with NGT.  I will sign off. Please call back as needed.      Yen Patino MD  General, Robotic and Endoscopic Surgery  [facility]    [address], [address]  [place], [place], [address]  P: [phone]  F: [phone]

## 2023-11-25 NOTE — PROGRESS NOTES
" LOS: 30 days     Name: Dee Herrera  Age: 33 y.o.  Sex: female  :  1990  MRN: [id number]         Primary Care Physician: Margarita Woods APRN    Subjective   Subjective  Patient was little more talkative with me this morning.  Nurse reports that she is doing well drinking apple juice but still really not eating much.    Objective   Vital Signs  Temp:  [98.8 °F (37.1 °C)] 98.8 °F (37.1 °C)  Heart Rate:  [82-92] 82  Resp:  [16] 16  BP: (103-111)/(67-82) 105/67  Body mass index is 17.21 kg/m².    Objective:  General Appearance:  Comfortable and in no acute distress (Chronically ill-appearing).    Vital signs: (most recent): Blood pressure 105/67, pulse 82, temperature 98.8 °F (37.1 °C), resp. rate 16, height 165 cm (64.96\"), weight 46.9 kg (103 lb 4.8 oz), last menstrual period 08/10/2022, SpO2 93%, not currently breastfeeding.    HEENT: (Cortrak in place)    Lungs:  Normal effort and normal respiratory rate.    Heart: Normal rate.  Regular rhythm.    Abdomen: Abdomen is soft.  Bowel sounds are normal.   There is no abdominal tenderness.     Extremities: There is no dependent edema or local swelling.    Neurological: Patient is alert and oriented to person, place and time.    Skin:  Warm and dry.                Results Review:       I reviewed the patient's new clinical results.    Results from last 7 days   Lab Units 23  0552 23  0556 23  0428 23  0550 23  0713 23  0317 23  0308   WBC 10*3/mm3 8.69 10.59 12.12* 12.10* 7.95 8.30 9.43   HEMOGLOBIN g/dL 9.4* 9.6* 9.5* 10.1* 9.9* 9.1* 9.5*   PLATELETS 10*3/mm3 182 200 199 235 210 201 180     Results from last 7 days   Lab Units 23  0552 23  0556 23  0428 23  0550 23  1718 23  0401 23  0317 23  0308   SODIUM mmol/L 126* 127* 127* 127*  --  128* 130* 128*   POTASSIUM mmol/L 3.5 3.6 3.3* 4.0 3.6 3.7 4.0 3.9   CHLORIDE mmol/L 86* 87* 88* 88*  --  90* 91* 92*   CO2 mmol/L " 28.5 29.8* 28.1 27.5  --  25.1 24.9 25.1   BUN mg/dL 48* 49* 40* 43*  --  35* 32* 27*   CREATININE mg/dL 4.78* 4.56* 4.98* 4.88*  --  5.37* 5.01* 4.50*   CALCIUM mg/dL 8.7 8.8 8.5* 8.8  --  8.6 8.4* 8.2*   GLUCOSE mg/dL 106* 107* 120* 112*  --  120* 94 107*                 Scheduled Meds:   aspirin, 81 mg, Nasogastric, Daily  carvedilol, 25 mg, Nasogastric, Q12H  chlorhexidine, 15 mL, Mouth/Throat, Q12H  Delflex-LC/2.5% Dextrose, 2,000 mL, Intraperitoneal, 5 Exchanges Daily - Dwell Overnight  escitalopram, 10 mg, Nasogastric, Daily  First Mouthwash (Magic Mouthwash), 10 mL, Swish & Spit, Q6H  hydroxychloroquine, 200 mg, Oral, Daily  insulin regular, 2-7 Units, Subcutaneous, Q6H  Lacosamide, 100 mg, Intravenous, Q12H  lactulose, 10 g, Nasogastric, TID  lidocaine, 10 mL, Subcutaneous, Once  metoclopramide, 5 mg, Intravenous, Q6H  mupirocin, , Each Nare, BID  mycophenolate, 250 mg, Oral, Q12H  pantoprazole, 40 mg, Intravenous, Q AM      PRN Meds:     acetaminophen **OR** acetaminophen **OR** acetaminophen    bisacodyl    bisacodyl    dextrose    dextrose    glucagon (human recombinant)    heparin (porcine)    hold    hydrALAZINE    ipratropium-albuterol    [] Morphine **AND** naloxone    nitroglycerin    ondansetron    polyethylene glycol  Continuous Infusions:  hold, 1 each        Assessment & Plan   Active Hospital Problems    Diagnosis  POA    **Dissecting ascending aortic aneurysm [I71.010]  Yes    Recent cerebrovascular accident (CVA) [Z86.73]  Yes    Aortic valve lesion [I35.8]  Yes    Severe aortic valve regurgitation [I35.1]  Yes    Hyponatremia [E87.1]  Yes    Poor appetite [R63.0]  Yes    Moderate malnutrition [E44.0]  Yes    Chronic diastolic CHF (congestive heart failure) [I50.32]  Yes    Anemia due to chronic kidney disease, on chronic dialysis [N18.6, D63.1, Z99.2]  Not Applicable    Peritoneal dialysis catheter in place [Z99.2]  Not Applicable    Essential hypertension [I10]  Yes    End stage  renal disease on dialysis [N18.6, Z99.2]  Not Applicable    Seizure disorder [G40.909]  Yes    Elevated liver function tests [R79.89]  Yes    Pericardial effusion [I31.39]  Yes    Systemic lupus erythematosus [M32.9]  Yes    Thrombocytopenia [D69.6]  Yes    Hypertension secondary to other renal disorders [I15.1]  Yes    Pancytopenia [D61.818]  Yes    Vitamin D deficiency [E55.9]  Yes      Resolved Hospital Problems    Diagnosis Date Resolved POA    Abdominal pain [R10.9] 10/28/2023 Yes       Assessment & Plan    -Oral intake and nutrition status remained poor.  Continue continuous tube feeds.  Vomiting has resolved. Continue lactulose.  Continue Reglan.  She continues to refuse PEG tube placement.  -Neurology has provided recommendations regarding her acute CVA.  Currently on low-dose aspirin.  On Vimpat for seizures.  -Keppra discontinued given possible effects on her mood.  Remains on Lexapro.  -She remains very withdrawn but denies depression.  Psychiatry did not have any specific recommendations.  -Continues on peritoneal dialysis as per nephrology.   -CellCept and Plaquenil have been restarted  -Blood sugars well controlled.  Continue low-dose sliding scale insulin  -Continue therapy services    She still does not say much and remains withdrawn but this is the most talkative I have seen her.  She stood with physical therapy yesterday and formed seated exercises.     Dispo  Will need rehab     Her prognosis is guarded and looking quite poor at this point in time given her ongoing refusal to pursue PEG tube.  Palliative care now following and plan to visit her again on Monday.      Expected Discharge Date: 11/28/2023; Expected Discharge Time:      Bobby Carter MD  [facility]  11/25/23  10:57 EST

## 2023-11-25 NOTE — PROGRESS NOTES
"[facility] f/u d/t depression. Chart reviewed, spoke w/ RN and met w/ pt. RN reports pt more talkative today but mostly no changes. Plan to dc to SNF vs. Rehab pending. Pt A&Ox4. States \"no\" to anxiety or depression. Denies SI/HI, hallucinations or wish to be dead. No other concerns at this time.   [facility] following.   "

## 2023-11-25 NOTE — PROGRESS NOTES
Hospital Follow Up    LOS:  LOS: 30 days   Patient Name: Dee Herrera  Age/Sex: 33 y.o. female  : 1990  MRN: [id number]    Day of Service: 23   Length of Stay: 30  Encounter Provider: Patricia Bardales MD  Place of Service: [facility] CARDIOLOGY  Patient Care Team:  Margarita Woods APRN as PCP - General (Nurse Practitioner)  Winnie Sanchez MD as Referring Physician (Obstetrics and Gynecology)  Norberto Almaraz MD PhD as Consulting Physician (Hematology and Oncology)  Lupis Thomas MD as Consulting Physician (Nephrology)  Hair Mckeon MD as Consulting Physician (Nephrology)  Juana Taylor MD as Consulting Physician (Cardiology)    Subjective:     Chief Complaint: aneurysm repair    Interval History: resting comfortably    Objective:     Objective:  Temp:  [98.2 °F (36.8 °C)-98.8 °F (37.1 °C)] 98.2 °F (36.8 °C)  Heart Rate:  [82-92] 85  Resp:  [16] 16  BP: ()/(63-82) 95/63     Intake/Output Summary (Last 24 hours) at 2023 1648  Last data filed at 2023 1354  Gross per 24 hour   Intake 32542 ml   Output 38722 ml   Net -303 ml     Body mass index is 17.21 kg/m².      23  0432 23  0500 23  0316   Weight: 48.8 kg (107 lb 9.4 oz) 47 kg (103 lb 9.9 oz) 46.9 kg (103 lb 4.8 oz)     Weight change: -0.143 kg (-5.1 oz)    Physical Exam:   General Appearance:    Awake alert and oriented in no acute distress.   Color:  Skin:  Neuro:  HEENT:    Lungs:     Pink  Warm and dry  No focal, motor or sensory deficits  Neck supple, pupils equal, round and reactive. No JVD, No Bruit  Clear to auscultation,respirations regular, even and                  unlabored    Heart:    Regular rate and rhythm, S1 and S2, no murmur, no gallop, no rub. No edema, DP/PT pulses are 2+   Chest Wall:    No abnormalities observed   Abdomen:     Normal bowel sounds, no masses, no organomegaly, soft        non-tender, non-distended, no guarding, no ascites  "noted   Extremities:   Moves all extremities well, no edema, no cyanosis, no redness       Lab Review:   Results from last 7 days   Lab Units 11/25/23  0552 11/24/23  0556   SODIUM mmol/L 126* 127*   POTASSIUM mmol/L 3.5 3.6   CHLORIDE mmol/L 86* 87*   CO2 mmol/L 28.5 29.8*   BUN mg/dL 48* 49*   CREATININE mg/dL 4.78* 4.56*   GLUCOSE mg/dL 106* 107*   CALCIUM mg/dL 8.7 8.8     Results from last 7 days   Lab Units 11/22/23  0550 11/21/23  2114 11/21/23  1718   HSTROP T ng/L 438* 417* 407*     Results from last 7 days   Lab Units 11/25/23  0552 11/24/23  0556   WBC 10*3/mm3 8.69 10.59   HEMOGLOBIN g/dL 9.4* 9.6*   HEMATOCRIT % 28.1* 29.2*   PLATELETS 10*3/mm3 182 200         Results from last 7 days   Lab Units 11/24/23  0556 11/22/23  0550   MAGNESIUM mg/dL 1.7 2.2           Invalid input(s): \"LDLCALC\"      Results from last 7 days   Lab Units 11/21/23  0401   TSH uIU/mL 4.220*     I reviewed the patient's new clinical results.  I personally viewed and interpreted the patient's EKG  Current Medications:   Scheduled Meds:aspirin, 81 mg, Nasogastric, Daily  carvedilol, 25 mg, Nasogastric, Q12H  chlorhexidine, 15 mL, Mouth/Throat, Q12H  Delflex-LC/2.5% Dextrose, 2,000 mL, Intraperitoneal, 6 Exchanges Daily (Q4H)  [START ON 11/27/2023] epoetin cari/cari-epbx, 5,000 Units, Subcutaneous, Once per day on Mon Wed Fri  escitalopram, 10 mg, Nasogastric, Daily  First Mouthwash (Magic Mouthwash), 10 mL, Swish & Spit, Q6H  hydroxychloroquine, 200 mg, Oral, Daily  insulin regular, 2-7 Units, Subcutaneous, Q6H  Lacosamide, 100 mg, Intravenous, Q12H  lactulose, 10 g, Nasogastric, TID  lidocaine, 10 mL, Subcutaneous, Once  metoclopramide, 5 mg, Intravenous, Q6H  midodrine, 2.5 mg, Oral, TID AC  mupirocin, , Each Nare, BID  mycophenolate, 250 mg, Oral, Q12H  pantoprazole, 40 mg, Intravenous, Q AM      Continuous Infusions:hold, 1 each        Allergies:  Allergies   Allergen Reactions    Minoxidil Hives and Other (See Comments)     " Pericardial effusion .       Assessment:       Ascending aortic aneurysm Dissection/Severe AR- S/p Surgical repair and replacement with 26mm graft and repair of Aortic valve  ESRD on HD  SLE  Thrombocytopenia  Cardiac tamponade with reexploration and removal of compression RV clot  Encephalopathy- improving  Essential HtN- controlled on current regimen with Coreg  Right MCA CVA  Chronic Anemia  Chronic Immunosupression  Plan:       no changes from cardiac perspective. Will depend on her desire for PEG tube. Will see again Monday.     Patricia Bardales MD  11/25/23  16:48 EST

## 2023-11-25 NOTE — PROGRESS NOTES
" LOS: 30 days   Patient Care Team:  Margarita Woods APRN as PCP - General (Nurse Practitioner)  Winnie Sanchez MD as Referring Physician (Obstetrics and Gynecology)  Norberto Almaraz MD PhD as Consulting Physician (Hematology and Oncology)  Lupis Thomas MD as Consulting Physician (Nephrology)  Hair Mckeon MD as Consulting Physician (Nephrology)  Juana Taylor MD as Consulting Physician (Cardiology)    Chief Complaint: Post op    Subjective:  Symptoms:  Stable.  No shortness of breath, cough or chest pain.    Diet:  Adequate intake (TF).  No nausea or vomiting.    Activity level: Impaired due to weakness.    Pain:  She reports no pain.      Asking for apple juice    Vital Signs  Temp:  [98.8 °F (37.1 °C)] 98.8 °F (37.1 °C)  Heart Rate:  [82-92] 82  Resp:  [16] 16  BP: (103-111)/(67-82) 105/67  Body mass index is 17.21 kg/m².    Intake/Output Summary (Last 24 hours) at 11/25/2023 0823  Last data filed at 11/25/2023 0800  Gross per 24 hour   Intake 49536 ml   Output 24150 ml   Net -1100 ml     I/O this shift:  In: 2000   Out: 2000 11/22/23  0432 11/24/23  0500 11/25/23  0316   Weight: 48.8 kg (107 lb 9.4 oz) 47 kg (103 lb 9.9 oz) 46.9 kg (103 lb 4.8 oz)     Objective:  General Appearance:  Comfortable and in no acute distress.    Vital signs: (most recent): Blood pressure 105/67, pulse 82, temperature 98.8 °F (37.1 °C), resp. rate 16, height 165 cm (64.96\"), weight 46.9 kg (103 lb 4.8 oz), last menstrual period 08/10/2022, SpO2 93%, not currently breastfeeding.  Vital signs are normal.  No fever.    Output: No urine output and producing stool.    Lungs:  Normal effort and normal respiratory rate.  There are decreased breath sounds.    Heart: Normal rate.  Regular rhythm.  (SR on tele monitor)  Abdomen: Abdomen is soft and non-distended.  Bowel sounds are normal.     Pulses: Distal pulses are intact.    Neurological: Patient is oriented to person, place and time.  (drowsy).    Skin:  " "Warm and dry.  (Sternal dressing clean, dry, and intact)          Results Review:        WBC WBC   Date Value Ref Range Status   11/25/2023 8.69 3.40 - 10.80 10*3/mm3 Final   11/24/2023 10.59 3.40 - 10.80 10*3/mm3 Final   11/23/2023 12.12 (H) 3.40 - 10.80 10*3/mm3 Final      HGB Hemoglobin   Date Value Ref Range Status   11/25/2023 9.4 (L) 12.0 - 15.9 g/dL Final   11/24/2023 9.6 (L) 12.0 - 15.9 g/dL Final   11/23/2023 9.5 (L) 12.0 - 15.9 g/dL Final      HCT Hematocrit   Date Value Ref Range Status   11/25/2023 28.1 (L) 34.0 - 46.6 % Final   11/24/2023 29.2 (L) 34.0 - 46.6 % Final   11/23/2023 28.6 (L) 34.0 - 46.6 % Final      Platelets Platelets   Date Value Ref Range Status   11/25/2023 182 140 - 450 10*3/mm3 Final   11/24/2023 200 140 - 450 10*3/mm3 Final   11/23/2023 199 140 - 450 10*3/mm3 Final        PT/INR:  No results found for: \"PROTIME\"/No results found for: \"INR\"    Sodium Sodium   Date Value Ref Range Status   11/25/2023 126 (L) 136 - 145 mmol/L Final   11/24/2023 127 (L) 136 - 145 mmol/L Final   11/23/2023 127 (L) 136 - 145 mmol/L Final      Potassium Potassium   Date Value Ref Range Status   11/25/2023 3.5 3.5 - 5.2 mmol/L Final   11/24/2023 3.6 3.5 - 5.2 mmol/L Final   11/23/2023 3.3 (L) 3.5 - 5.2 mmol/L Final     Comment:     Slight hemolysis detected by analyzer. Result may be falsely elevated.      Chloride Chloride   Date Value Ref Range Status   11/25/2023 86 (L) 98 - 107 mmol/L Final   11/24/2023 87 (L) 98 - 107 mmol/L Final   11/23/2023 88 (L) 98 - 107 mmol/L Final      Bicarbonate CO2   Date Value Ref Range Status   11/25/2023 28.5 22.0 - 29.0 mmol/L Final   11/24/2023 29.8 (H) 22.0 - 29.0 mmol/L Final   11/23/2023 28.1 22.0 - 29.0 mmol/L Final      BUN BUN   Date Value Ref Range Status   11/25/2023 48 (H) 6 - 20 mg/dL Final   11/24/2023 49 (H) 6 - 20 mg/dL Final   11/23/2023 40 (H) 6 - 20 mg/dL Final      Creatinine Creatinine   Date Value Ref Range Status   11/25/2023 4.78 (H) 0.57 - 1.00 " mg/dL Final   11/24/2023 4.56 (H) 0.57 - 1.00 mg/dL Final   11/23/2023 4.98 (H) 0.57 - 1.00 mg/dL Final      Calcium Calcium   Date Value Ref Range Status   11/25/2023 8.7 8.6 - 10.5 mg/dL Final   11/24/2023 8.8 8.6 - 10.5 mg/dL Final   11/23/2023 8.5 (L) 8.6 - 10.5 mg/dL Final      Magnesium Magnesium   Date Value Ref Range Status   11/24/2023 1.7 1.6 - 2.6 mg/dL Final          aspirin, 81 mg, Nasogastric, Daily  carvedilol, 25 mg, Nasogastric, Q12H  chlorhexidine, 15 mL, Mouth/Throat, Q12H  Delflex-LC/2.5% Dextrose, 2,000 mL, Intraperitoneal, 5 Exchanges Daily - Dwell Overnight  escitalopram, 10 mg, Nasogastric, Daily  First Mouthwash (Magic Mouthwash), 10 mL, Swish & Spit, Q6H  hydroxychloroquine, 200 mg, Oral, Daily  insulin regular, 2-7 Units, Subcutaneous, Q6H  Lacosamide, 100 mg, Intravenous, Q12H  lactulose, 10 g, Nasogastric, TID  lidocaine, 10 mL, Subcutaneous, Once  metoclopramide, 5 mg, Intravenous, Q6H  mupirocin, , Each Nare, BID  mycophenolate, 250 mg, Oral, Q12H  pantoprazole, 40 mg, Intravenous, Q AM      hold, 1 each      Assessment & Plan  - Ascending aortic aneurysm with dissection- s/p ascending aortic dissection repair/proximal replacement, gacron interposition graft, kelli procedure; insertion of right femoral shiley---POD#22; Pagni  - Cardiac tamponade- reexploration for bleeding, removal of large clot compressing the RV---POD#18; Camporrotondo  - severe aortic valve insufficiency  - Encephalopathy, improving   - ESRD on PD  - Lupus--on Cellcept/plaquenil; currently held  - Epilepsy  - chronic immunosuppression  - hypertension  - chronic anemia  - TCP--chronic; improving  - Depression--started on lexapro 11/14  - right MCA CVA--continue ASA per neuro  - hyponatremia   - malnutrition r/t decreased caloric intake--nutrition following      Remains withdrawn---psych and access are following. Continue lexapro. She denies depression but expresses frustration of extended hospitalization.   Still not  taking much in by mouth. Remains on continuous tube feedings. Patient still declines transition to HD/PEG placement. See palliative care note regards goals of care.  Remains in NSR. BP stable on current regimen  On PD--managed per nephrology with worsening hyponatremia--await nephrology input.   Paint sternal incision with betadine daily  She is very weak. Continue aggressive PT/OT  D/c plan TBD  Continue supportive care    CAESAR Alonzo  11/25/23  08:23 EST

## 2023-11-25 NOTE — PROGRESS NOTES
[facility] Progress Note      Patient Name: Dee Herrera  : 1990  MRN: [id number]  Primary Care Physician:  Margarita Woods APRN  Date of admission: 10/26/2023    Subjective     Interval History:   F/u ESRD     Patient lying recliner feeling tired and fatigue  Tolerating feeds  Undergoing peritoneal dialysis without any complications  No recent bowel movements  No fevers or chills  Accompanied by family member at bedside      Review of Systems:     Review as above    Objective     Vitals:   Temp:  [98.2 °F (36.8 °C)-98.8 °F (37.1 °C)] 98.2 °F (36.8 °C)  Heart Rate:  [82-92] 85  Resp:  [16] 16  BP: ()/(63-82) 95/63    Intake/Output Summary (Last 24 hours) at 2023 1635  Last data filed at 2023 1354  Gross per 24 hour   Intake 38730 ml   Output 25224 ml   Net -303 ml       Physical Exam:    General Appearance: frail cachectic AAF.  Head.  Dobbhoff catheter in place   Neck: supple, no JVD  Lungs: CTA bilat no rales  Heart: RRR, normal S1 and S2  Abdomen: soft, nontender, nondistended.  PD catheter in place with clean exit site  Extremities: no edema, cyanosis or clubbing  CNS : Diffuse muscle atrophy and muscle wasting.      Scheduled Meds:     aspirin, 81 mg, Nasogastric, Daily  carvedilol, 25 mg, Nasogastric, Q12H  chlorhexidine, 15 mL, Mouth/Throat, Q12H  Delflex-LC/2.5% Dextrose, 2,000 mL, Intraperitoneal, 5 Exchanges Daily - Dwell Overnight  escitalopram, 10 mg, Nasogastric, Daily  First Mouthwash (Magic Mouthwash), 10 mL, Swish & Spit, Q6H  hydroxychloroquine, 200 mg, Oral, Daily  insulin regular, 2-7 Units, Subcutaneous, Q6H  Lacosamide, 100 mg, Intravenous, Q12H  lactulose, 10 g, Nasogastric, TID  lidocaine, 10 mL, Subcutaneous, Once  metoclopramide, 5 mg, Intravenous, Q6H  mupirocin, , Each Nare, BID  mycophenolate, 250 mg, Oral, Q12H  pantoprazole, 40 mg, Intravenous, Q AM      IV Meds:   hold, 1 each        Results Reviewed:   I have personally  reviewed the results from the time of this admission to 11/25/2023 16:35 EST     Results from last 7 days   Lab Units 11/25/23  0552 11/24/23  0556 11/23/23  0428   SODIUM mmol/L 126* 127* 127*   POTASSIUM mmol/L 3.5 3.6 3.3*   CHLORIDE mmol/L 86* 87* 88*   CO2 mmol/L 28.5 29.8* 28.1   BUN mg/dL 48* 49* 40*   CREATININE mg/dL 4.78* 4.56* 4.98*   CALCIUM mg/dL 8.7 8.8 8.5*   GLUCOSE mg/dL 106* 107* 120*     Estimated Creatinine Clearance: 12.6 mL/min (A) (by C-G formula based on SCr of 4.78 mg/dL (H)).  Results from last 7 days   Lab Units 11/25/23  0552 11/24/23  0556 11/23/23  0428 11/22/23  0550 11/21/23  1718   MAGNESIUM mg/dL  --  1.7  --  2.2 1.5*   PHOSPHORUS mg/dL 2.7 2.6 3.7 3.7  --          Results from last 7 days   Lab Units 11/25/23  0552 11/24/23  0556 11/23/23  0428 11/22/23  0550 11/21/23  0713   WBC 10*3/mm3 8.69 10.59 12.12* 12.10* 7.95   HEMOGLOBIN g/dL 9.4* 9.6* 9.5* 10.1* 9.9*   PLATELETS 10*3/mm3 182 200 199 235 210           Assessment / Plan     ASSESSMENT:  -. ESRD.  Currently on peritoneal dialysis.  More effective UF yesterday with addition of heparin.  Plan was to transition back to IHD upon placing PEG to support nutrition but she's refusing this.  Mild hypervolemic hyponatremia, Na 127.  Won't switch back to 4.25% exchanges yet, became hypotensive with this earlier in week.  K repleted  -.  Acute CVA cortical infarct in the anterior inferior medial right frontal lobe.  Subacute subdural hematoma right occipital.  -.  Ascending aortic aneurysm with subacute/chronic aortic root dissection.  Status post repair of proximal aortic aneurysm 11/2/2023.  Re- exploration for cardiac tamponade 11/6/2023.  -.  Seizure disorder currently on Vimpat  -.  Failure to thrive.  Nocturnal TFs.  Scant oral intake.  Refuses PEG.  Alb 2  -.  SLE.  C3 mildly low, C4 normal.  Anti-double-stranded DNA antibody elevated 11/20/2023.  Plaquenil and CellCept resumed   -.  Anemia CKD,  continue patient protocol 5000  units 3 controlled  -.  Depression - on lexapro       PLAN:  Will increase PD exchanges from 5 to 6 exchanges to attempt to improve   Encourage patient to increase her oral intake  Continues to tolerate feeds  Prognosis very poor and palliative care following    Thank you for allowing us to participate in this patient care    Family at bedside updated      Hair Mckeon MD  11/25/23  16:35 EST    [facility]  [phone]

## 2023-11-26 LAB
ALBUMIN SERPL-MCNC: 2.5 G/DL (ref 3.5–5.2)
ANION GAP SERPL CALCULATED.3IONS-SCNC: 13 MMOL/L (ref 5–15)
BASOPHILS # BLD AUTO: 0.02 10*3/MM3 (ref 0–0.2)
BASOPHILS NFR BLD AUTO: 0.3 % (ref 0–1.5)
BUN SERPL-MCNC: 56 MG/DL (ref 6–20)
BUN/CREAT SERPL: 12.2 (ref 7–25)
CALCIUM SPEC-SCNC: 9.2 MG/DL (ref 8.6–10.5)
CHLORIDE SERPL-SCNC: 85 MMOL/L (ref 98–107)
CO2 SERPL-SCNC: 29 MMOL/L (ref 22–29)
CREAT SERPL-MCNC: 4.58 MG/DL (ref 0.57–1)
DEPRECATED RDW RBC AUTO: 44.4 FL (ref 37–54)
EGFRCR SERPLBLD CKD-EPI 2021: 12.5 ML/MIN/1.73
EOSINOPHIL # BLD AUTO: 0.23 10*3/MM3 (ref 0–0.4)
EOSINOPHIL NFR BLD AUTO: 2.9 % (ref 0.3–6.2)
ERYTHROCYTE [DISTWIDTH] IN BLOOD BY AUTOMATED COUNT: 14.3 % (ref 12.3–15.4)
GLUCOSE BLDC GLUCOMTR-MCNC: 118 MG/DL (ref 70–130)
GLUCOSE BLDC GLUCOMTR-MCNC: 124 MG/DL (ref 70–130)
GLUCOSE BLDC GLUCOMTR-MCNC: 136 MG/DL (ref 70–130)
GLUCOSE BLDC GLUCOMTR-MCNC: 482 MG/DL (ref 70–130)
GLUCOSE BLDC GLUCOMTR-MCNC: 88 MG/DL (ref 70–130)
GLUCOSE SERPL-MCNC: 112 MG/DL (ref 65–99)
HCT VFR BLD AUTO: 31.5 % (ref 34–46.6)
HGB BLD-MCNC: 10.2 G/DL (ref 12–15.9)
IMM GRANULOCYTES # BLD AUTO: 0.06 10*3/MM3 (ref 0–0.05)
IMM GRANULOCYTES NFR BLD AUTO: 0.8 % (ref 0–0.5)
LYMPHOCYTES # BLD AUTO: 0.41 10*3/MM3 (ref 0.7–3.1)
LYMPHOCYTES NFR BLD AUTO: 5.2 % (ref 19.6–45.3)
MCH RBC QN AUTO: 27.6 PG (ref 26.6–33)
MCHC RBC AUTO-ENTMCNC: 32.4 G/DL (ref 31.5–35.7)
MCV RBC AUTO: 85.1 FL (ref 79–97)
MONOCYTES # BLD AUTO: 0.95 10*3/MM3 (ref 0.1–0.9)
MONOCYTES NFR BLD AUTO: 12 % (ref 5–12)
NEUTROPHILS NFR BLD AUTO: 6.25 10*3/MM3 (ref 1.7–7)
NEUTROPHILS NFR BLD AUTO: 78.8 % (ref 42.7–76)
NRBC BLD AUTO-RTO: 0 /100 WBC (ref 0–0.2)
PHOSPHATE SERPL-MCNC: 2.1 MG/DL (ref 2.5–4.5)
PLATELET # BLD AUTO: 212 10*3/MM3 (ref 140–450)
PMV BLD AUTO: 12.5 FL (ref 6–12)
POTASSIUM SERPL-SCNC: 3.2 MMOL/L (ref 3.5–5.2)
RBC # BLD AUTO: 3.7 10*6/MM3 (ref 3.77–5.28)
SODIUM SERPL-SCNC: 127 MMOL/L (ref 136–145)
WBC NRBC COR # BLD AUTO: 7.92 10*3/MM3 (ref 3.4–10.8)

## 2023-11-26 PROCEDURE — 80069 RENAL FUNCTION PANEL: CPT | Performed by: INTERNAL MEDICINE

## 2023-11-26 PROCEDURE — 99024 POSTOP FOLLOW-UP VISIT: CPT | Performed by: THORACIC SURGERY (CARDIOTHORACIC VASCULAR SURGERY)

## 2023-11-26 PROCEDURE — 25010000002 METOCLOPRAMIDE PER 10 MG: Performed by: INTERNAL MEDICINE

## 2023-11-26 PROCEDURE — 63710000001 MYCOPHENOLATE MOFETIL PER 250 MG: Performed by: NURSE PRACTITIONER

## 2023-11-26 PROCEDURE — 25010000002 LACOSAMIDE 200 MG/20ML SOLUTION: Performed by: NURSE PRACTITIONER

## 2023-11-26 PROCEDURE — C9254 INJECTION, LACOSAMIDE: HCPCS | Performed by: NURSE PRACTITIONER

## 2023-11-26 PROCEDURE — 82948 REAGENT STRIP/BLOOD GLUCOSE: CPT

## 2023-11-26 PROCEDURE — 25010000002 POTASSIUM CHLORIDE 10 MEQ/100ML SOLUTION: Performed by: INTERNAL MEDICINE

## 2023-11-26 PROCEDURE — 63710000001 INSULIN REGULAR HUMAN PER 5 UNITS: Performed by: NURSE PRACTITIONER

## 2023-11-26 PROCEDURE — 85025 COMPLETE CBC W/AUTO DIFF WBC: CPT | Performed by: INTERNAL MEDICINE

## 2023-11-26 RX ORDER — POTASSIUM CHLORIDE 1.5 G/1.58G
40 POWDER, FOR SOLUTION ORAL EVERY 4 HOURS
Status: DISCONTINUED | OUTPATIENT
Start: 2023-11-26 | End: 2023-11-26

## 2023-11-26 RX ORDER — POTASSIUM CHLORIDE 7.45 MG/ML
10 INJECTION INTRAVENOUS
Status: COMPLETED | OUTPATIENT
Start: 2023-11-26 | End: 2023-11-26

## 2023-11-26 RX ADMIN — CARVEDILOL 25 MG: 25 TABLET, FILM COATED ORAL at 08:52

## 2023-11-26 RX ADMIN — ASPIRIN 81 MG: 81 TABLET, CHEWABLE ORAL at 08:52

## 2023-11-26 RX ADMIN — DEXTROSE MONOHYDRATE, SODIUM CHLORIDE, SODIUM LACTATE, CALCIUM CHLORIDE, MAGNESIUM CHLORIDE 2000 ML: 2.5; 538; 448; 18.4; 5.08 SOLUTION INTRAPERITONEAL at 20:44

## 2023-11-26 RX ADMIN — ESCITALOPRAM OXALATE 10 MG: 10 TABLET, FILM COATED ORAL at 08:52

## 2023-11-26 RX ADMIN — LACOSAMIDE 100 MG: 10 INJECTION INTRAVENOUS at 16:05

## 2023-11-26 RX ADMIN — CARBIDOPA AND LEVODOPA 2.5 MG: 50; 200 TABLET, EXTENDED RELEASE ORAL at 12:00

## 2023-11-26 RX ADMIN — METOCLOPRAMIDE 5 MG: 5 INJECTION, SOLUTION INTRAMUSCULAR; INTRAVENOUS at 12:00

## 2023-11-26 RX ADMIN — CARVEDILOL 25 MG: 25 TABLET, FILM COATED ORAL at 21:12

## 2023-11-26 RX ADMIN — HYDROXYCHLOROQUINE SULFATE 200 MG: 200 TABLET ORAL at 08:52

## 2023-11-26 RX ADMIN — ACETAMINOPHEN 650 MG: 160 SOLUTION ORAL at 22:59

## 2023-11-26 RX ADMIN — INSULIN HUMAN 7 UNITS: 100 INJECTION, SOLUTION PARENTERAL at 16:56

## 2023-11-26 RX ADMIN — METOCLOPRAMIDE 5 MG: 5 INJECTION, SOLUTION INTRAMUSCULAR; INTRAVENOUS at 16:55

## 2023-11-26 RX ADMIN — CARBIDOPA AND LEVODOPA 2.5 MG: 50; 200 TABLET, EXTENDED RELEASE ORAL at 16:55

## 2023-11-26 RX ADMIN — DEXTROSE MONOHYDRATE, SODIUM CHLORIDE, SODIUM LACTATE, CALCIUM CHLORIDE, MAGNESIUM CHLORIDE 2000 ML: 2.5; 538; 448; 18.4; 5.08 SOLUTION INTRAPERITONEAL at 16:06

## 2023-11-26 RX ADMIN — POTASSIUM CHLORIDE 10 MEQ: 7.46 INJECTION, SOLUTION INTRAVENOUS at 16:05

## 2023-11-26 RX ADMIN — DEXTROSE MONOHYDRATE, SODIUM CHLORIDE, SODIUM LACTATE, CALCIUM CHLORIDE, MAGNESIUM CHLORIDE 2000 ML: 2.5; 538; 448; 18.4; 5.08 SOLUTION INTRAPERITONEAL at 08:45

## 2023-11-26 RX ADMIN — DEXTROSE MONOHYDRATE, SODIUM CHLORIDE, SODIUM LACTATE, CALCIUM CHLORIDE, MAGNESIUM CHLORIDE 2000 ML: 2.5; 538; 448; 18.4; 5.08 SOLUTION INTRAPERITONEAL at 00:13

## 2023-11-26 RX ADMIN — CARBIDOPA AND LEVODOPA 2.5 MG: 50; 200 TABLET, EXTENDED RELEASE ORAL at 08:52

## 2023-11-26 RX ADMIN — LACOSAMIDE 100 MG: 10 INJECTION INTRAVENOUS at 04:17

## 2023-11-26 RX ADMIN — DEXTROSE MONOHYDRATE, SODIUM CHLORIDE, SODIUM LACTATE, CALCIUM CHLORIDE, MAGNESIUM CHLORIDE 2000 ML: 2.5; 538; 448; 18.4; 5.08 SOLUTION INTRAPERITONEAL at 12:35

## 2023-11-26 RX ADMIN — POTASSIUM CHLORIDE 10 MEQ: 7.46 INJECTION, SOLUTION INTRAVENOUS at 17:18

## 2023-11-26 NOTE — NURSING NOTE
Blood sugar 482 with dinnertime check. Patient in room eating airheads and snickers- candy taken away from patient. 7 units of insulin given. Tube feeds unable to infuse since 1300. MD notified and orders to only give the 7 units of insulin and to recheck blood sugar 3 hours after insulin given.

## 2023-11-26 NOTE — NURSING NOTE
Patient refusing 0400 PD exchange and discussing refusing further exchanges.  RN attempted to educate patient about importance of exchanges.  Patient continues to refuse exchange.  MD paged.

## 2023-11-26 NOTE — PROGRESS NOTES
"[facility] f/u d/t depression. Chart reviewed, spoke w/ RN and met w/ pt. Chart indicates pt remains withdrawn and little food intake and requiring tube feeding. RN reports, \"Overnight she refused a lot of meds and her dialysis, but she has been more agreeable this morning. Has been up out of bed more and at least trying to eat/drink a little more but not much progress\". Pt A&Ox2 (year only known, unable to state POTUS). Pt rates anxiety \"5/10\" and depression \"0/10\" today (10 being worst on both scales). Pt denies SI/HI, hallucinations or wish to be dead. Pt denies further concerns at this time.   [facility] following.   "

## 2023-11-26 NOTE — PROGRESS NOTES
" LOS: 31 days     Name: Dee Herrera  Age: 33 y.o.  Sex: female  :  1990  MRN: [id number]         Primary Care Physician: Margarita Woods, CAESAR    Subjective   Subjective  She refused peritoneal dialysis last night.  Nurse was able to get her to agree to initiating it this morning but then the patient asked for it to be paused as it was causing her abdominal cramping.  She is now once again stating that she would like to transition to hemodialysis.  Oral intake remains minimal.  About the only thing she is taking and is apple juice.    Objective   Vital Signs  Temp:  [98 °F (36.7 °C)-99.3 °F (37.4 °C)] 99.3 °F (37.4 °C)  Heart Rate:  [85-92] 92  Resp:  [16] 16  BP: ()/() 117/83  Body mass index is 17.22 kg/m².    Objective:  General Appearance:  Comfortable, in no acute distress and ill-appearing (Chronically ill-appearing).    Vital signs: (most recent): Blood pressure 117/83, pulse 92, temperature 99.3 °F (37.4 °C), resp. rate 16, height 165 cm (64.96\"), weight 46.9 kg (103 lb 5.3 oz), last menstrual period 08/10/2022, SpO2 97%, not currently breastfeeding.    Lungs:  Normal effort and normal respiratory rate.  She is not in respiratory distress.  There are decreased breath sounds.    Heart: Normal rate.  Regular rhythm.    Abdomen: Abdomen is soft.  Bowel sounds are normal.   There is no abdominal tenderness.     Extremities: There is no dependent edema or local swelling.    Neurological: Patient is alert and oriented to person, place and time.    Skin:  Warm and dry.                Results Review:       I reviewed the patient's new clinical results.    Results from last 7 days   Lab Units 23  0612 23  0552 23  0556 23  0428 23  0550 23  0713 23  0317   WBC 10*3/mm3 7.92 8.69 10.59 12.12* 12.10* 7.95 8.30   HEMOGLOBIN g/dL 10.2* 9.4* 9.6* 9.5* 10.1* 9.9* 9.1*   PLATELETS 10*3/mm3 212 182 200 199 235 210 201     Results from last 7 days   Lab Units " 23  0612 23  0552 23  0556 23  0428 23  0550 23  1718 23  0401 23  0317   SODIUM mmol/L 127* 126* 127* 127* 127*  --  128* 130*   POTASSIUM mmol/L 3.2* 3.5 3.6 3.3* 4.0 3.6 3.7 4.0   CHLORIDE mmol/L 85* 86* 87* 88* 88*  --  90* 91*   CO2 mmol/L 29.0 28.5 29.8* 28.1 27.5  --  25.1 24.9   BUN mg/dL 56* 48* 49* 40* 43*  --  35* 32*   CREATININE mg/dL 4.58* 4.78* 4.56* 4.98* 4.88*  --  5.37* 5.01*   CALCIUM mg/dL 9.2 8.7 8.8 8.5* 8.8  --  8.6 8.4*   GLUCOSE mg/dL 112* 106* 107* 120* 112*  --  120* 94                 Scheduled Meds:   aspirin, 81 mg, Nasogastric, Daily  carvedilol, 25 mg, Nasogastric, Q12H  chlorhexidine, 15 mL, Mouth/Throat, Q12H  Delflex-LC/2.5% Dextrose, 2,000 mL, Intraperitoneal, 6 Exchanges Daily (Q4H)  [START ON 2023] epoetin cari/cari-epbx, 5,000 Units, Subcutaneous, Once per day on   escitalopram, 10 mg, Nasogastric, Daily  First Mouthwash (Magic Mouthwash), 10 mL, Swish & Spit, Q6H  hydroxychloroquine, 200 mg, Oral, Daily  insulin regular, 2-7 Units, Subcutaneous, Q6H  Lacosamide, 100 mg, Intravenous, Q12H  lactulose, 10 g, Nasogastric, TID  lidocaine, 10 mL, Subcutaneous, Once  metoclopramide, 5 mg, Intravenous, Q6H  midodrine, 2.5 mg, Oral, TID AC  mupirocin, , Each Nare, BID  mycophenolate, 250 mg, Oral, Q12H  pantoprazole, 40 mg, Intravenous, Q AM      PRN Meds:     acetaminophen **OR** acetaminophen **OR** acetaminophen    bisacodyl    bisacodyl    dextrose    dextrose    glucagon (human recombinant)    heparin (porcine)    hold    ipratropium-albuterol    [] Morphine **AND** naloxone    nitroglycerin    ondansetron    polyethylene glycol  Continuous Infusions:  hold, 1 each        Assessment & Plan   Active Hospital Problems    Diagnosis  POA    **Dissecting ascending aortic aneurysm [I71.010]  Yes    Recent cerebrovascular accident (CVA) [Z86.73]  Yes    Aortic valve lesion [I35.8]  Yes    Severe aortic valve  regurgitation [I35.1]  Yes    Hyponatremia [E87.1]  Yes    Poor appetite [R63.0]  Yes    Moderate malnutrition [E44.0]  Yes    Chronic diastolic CHF (congestive heart failure) [I50.32]  Yes    Anemia due to chronic kidney disease, on chronic dialysis [N18.6, D63.1, Z99.2]  Not Applicable    Peritoneal dialysis catheter in place [Z99.2]  Not Applicable    Essential hypertension [I10]  Yes    End stage renal disease on dialysis [N18.6, Z99.2]  Not Applicable    Seizure disorder [G40.909]  Yes    Elevated liver function tests [R79.89]  Yes    Pericardial effusion [I31.39]  Yes    Systemic lupus erythematosus [M32.9]  Yes    Thrombocytopenia [D69.6]  Yes    Hypertension secondary to other renal disorders [I15.1]  Yes    Pancytopenia [D61.818]  Yes    Vitamin D deficiency [E55.9]  Yes      Resolved Hospital Problems    Diagnosis Date Resolved POA    Abdominal pain [R10.9] 10/28/2023 Yes       Assessment & Plan    -Oral intake and nutrition status remained poor.  Continue continuous tube feeds.  Vomiting has resolved. Continue lactulose.  Continue Reglan.  She continues to refuse PEG tube placement.  -Neurology has provided recommendations regarding her acute CVA.  Currently on low-dose aspirin.  On Vimpat for seizures.  -Keppra discontinued given possible effects on her mood.  Remains on Lexapro.  -She remains very withdrawn but denies depression.  Psychiatry did not have any specific recommendations.  -Patient now starting to refuse peritoneal dialysis.  I discussed this with her today and suggested she reconsider transition to hemodialysis as per our plans last week.  She now states she is agreeable to this.  I informed her that moving forward she needs to make decisions and stick with them rather than vacillating like she did last week.  Will await evaluation from nephrology.  -CellCept and Plaquenil have been restarted  -Blood sugars well controlled.  Continue low-dose sliding scale insulin  -Continue therapy  services     Dispo  Will need rehab     Her prognosis is guarded and looking quite poor at this point in time given her ongoing refusal to pursue PEG tube.  Also now starting to refuse PD.  Palliative care following and plan to visit her again on Monday.      Expected Discharge Date: 11/28/2023; Expected Discharge Time:      Bobby Carter MD  [facility]  11/26/23  10:28 EST

## 2023-11-26 NOTE — PROGRESS NOTES
" LOS: 31 days   Patient Care Team:  Margarita Woods APRN as PCP - General (Nurse Practitioner)  Winnie Sanchez MD as Referring Physician (Obstetrics and Gynecology)  Norberto Almaraz MD PhD as Consulting Physician (Hematology and Oncology)  Lupis Thomas MD as Consulting Physician (Nephrology)  Hair Mckeon MD as Consulting Physician (Nephrology)  Juana Taylor MD as Consulting Physician (Cardiology)    Chief Complaint: Post op    Subjective:  Symptoms:  Stable.  No shortness of breath, cough or chest pain.    Diet:  Adequate intake (TF).  No nausea or vomiting.    Activity level: Impaired due to weakness.    Pain:  She reports no pain.        Vital Signs  Temp:  [98 °F (36.7 °C)-99.3 °F (37.4 °C)] 99.3 °F (37.4 °C)  Heart Rate:  [85-92] 92  Resp:  [16] 16  BP: ()/() 117/83  Body mass index is 17.22 kg/m².    Intake/Output Summary (Last 24 hours) at 11/26/2023 0743  Last data filed at 11/26/2023 0538  Gross per 24 hour   Intake 35730 ml   Output 78036 ml   Net 275 ml     No intake/output data recorded.          11/24/23  0500 11/25/23  0316 11/26/23  0527   Weight: 47 kg (103 lb 9.9 oz) 46.9 kg (103 lb 4.8 oz) 46.9 kg (103 lb 5.3 oz)     Objective:  General Appearance:  Comfortable and in no acute distress.    Vital signs: (most recent): Blood pressure 117/83, pulse 92, temperature 99.3 °F (37.4 °C), resp. rate 16, height 165 cm (64.96\"), weight 46.9 kg (103 lb 5.3 oz), last menstrual period 08/10/2022, SpO2 97%, not currently breastfeeding.  Vital signs are normal.  No fever.    Output: No urine output and producing stool.    Lungs:  Normal effort and normal respiratory rate.  There are decreased breath sounds.    Heart: Normal rate.  Regular rhythm.  (SR on tele monitor)  Abdomen: Abdomen is soft and non-distended.  Bowel sounds are normal.     Pulses: Distal pulses are intact.    Neurological: Patient is oriented to person, place and time.  (drowsy).    Skin:  Warm and dry.  " "(Sternal dressing clean, dry, and intact)          Results Review:        WBC WBC   Date Value Ref Range Status   11/26/2023 7.92 3.40 - 10.80 10*3/mm3 Final   11/25/2023 8.69 3.40 - 10.80 10*3/mm3 Final   11/24/2023 10.59 3.40 - 10.80 10*3/mm3 Final      HGB Hemoglobin   Date Value Ref Range Status   11/26/2023 10.2 (L) 12.0 - 15.9 g/dL Final   11/25/2023 9.4 (L) 12.0 - 15.9 g/dL Final   11/24/2023 9.6 (L) 12.0 - 15.9 g/dL Final      HCT Hematocrit   Date Value Ref Range Status   11/26/2023 31.5 (L) 34.0 - 46.6 % Final   11/25/2023 28.1 (L) 34.0 - 46.6 % Final   11/24/2023 29.2 (L) 34.0 - 46.6 % Final      Platelets Platelets   Date Value Ref Range Status   11/26/2023 212 140 - 450 10*3/mm3 Final   11/25/2023 182 140 - 450 10*3/mm3 Final   11/24/2023 200 140 - 450 10*3/mm3 Final        PT/INR:  No results found for: \"PROTIME\"/No results found for: \"INR\"    Sodium Sodium   Date Value Ref Range Status   11/26/2023 127 (L) 136 - 145 mmol/L Final   11/25/2023 126 (L) 136 - 145 mmol/L Final   11/24/2023 127 (L) 136 - 145 mmol/L Final      Potassium Potassium   Date Value Ref Range Status   11/26/2023 3.2 (L) 3.5 - 5.2 mmol/L Final   11/25/2023 3.5 3.5 - 5.2 mmol/L Final   11/24/2023 3.6 3.5 - 5.2 mmol/L Final      Chloride Chloride   Date Value Ref Range Status   11/26/2023 85 (L) 98 - 107 mmol/L Final   11/25/2023 86 (L) 98 - 107 mmol/L Final   11/24/2023 87 (L) 98 - 107 mmol/L Final      Bicarbonate CO2   Date Value Ref Range Status   11/26/2023 29.0 22.0 - 29.0 mmol/L Final   11/25/2023 28.5 22.0 - 29.0 mmol/L Final   11/24/2023 29.8 (H) 22.0 - 29.0 mmol/L Final      BUN BUN   Date Value Ref Range Status   11/26/2023 56 (H) 6 - 20 mg/dL Final   11/25/2023 48 (H) 6 - 20 mg/dL Final   11/24/2023 49 (H) 6 - 20 mg/dL Final      Creatinine Creatinine   Date Value Ref Range Status   11/26/2023 4.58 (H) 0.57 - 1.00 mg/dL Final   11/25/2023 4.78 (H) 0.57 - 1.00 mg/dL Final   11/24/2023 4.56 (H) 0.57 - 1.00 mg/dL Final    "   Calcium Calcium   Date Value Ref Range Status   11/26/2023 9.2 8.6 - 10.5 mg/dL Final   11/25/2023 8.7 8.6 - 10.5 mg/dL Final   11/24/2023 8.8 8.6 - 10.5 mg/dL Final      Magnesium Magnesium   Date Value Ref Range Status   11/24/2023 1.7 1.6 - 2.6 mg/dL Final          aspirin, 81 mg, Nasogastric, Daily  carvedilol, 25 mg, Nasogastric, Q12H  chlorhexidine, 15 mL, Mouth/Throat, Q12H  Delflex-LC/2.5% Dextrose, 2,000 mL, Intraperitoneal, 6 Exchanges Daily (Q4H)  [START ON 11/27/2023] epoetin cari/cari-epbx, 5,000 Units, Subcutaneous, Once per day on Mon Wed Fri  escitalopram, 10 mg, Nasogastric, Daily  First Mouthwash (Magic Mouthwash), 10 mL, Swish & Spit, Q6H  hydroxychloroquine, 200 mg, Oral, Daily  insulin regular, 2-7 Units, Subcutaneous, Q6H  Lacosamide, 100 mg, Intravenous, Q12H  lactulose, 10 g, Nasogastric, TID  lidocaine, 10 mL, Subcutaneous, Once  metoclopramide, 5 mg, Intravenous, Q6H  midodrine, 2.5 mg, Oral, TID AC  mupirocin, , Each Nare, BID  mycophenolate, 250 mg, Oral, Q12H  pantoprazole, 40 mg, Intravenous, Q AM      hold, 1 each      Assessment & Plan  - Ascending aortic aneurysm with dissection- s/p ascending aortic dissection repair/proximal replacement, gacron interposition graft, kelli procedure; insertion of right femoral shiley---POD#23; Pagni  - Cardiac tamponade- reexploration for bleeding, removal of large clot compressing the RV---POD#18; Camporrotondo  - severe aortic valve insufficiency  - Encephalopathy, improving   - ESRD on PD  - Lupus--on Cellcept/plaquenil; currently held  - Epilepsy  - chronic immunosuppression  - hypertension  - chronic anemia  - TCP--chronic; improving  - Depression--started on lexapro 11/14  - right MCA CVA--continue ASA per neuro  - hyponatremia   - malnutrition r/t decreased caloric intake--nutrition following      Remains withdrawn---psych and access are following. Continue lexapro. She denies depression but states she wants to go home.   Still not taking  much in by mouth. Remains on continuous tube feedings per nutrition. She is asking to see a menu and is drinking some juice.   See palliative care note regards goals of care. She has stated numerous times she does NOT want a PEG and does not want to transition to HD.  Remains in NSR. BP stable on current regimen  On PD--managed per nephrology with on-going hyponatremia--nephrology following.   Paint sternal incision with betadine daily  She is very weak. Continue aggressive PT/OT  D/c plan TBD--SNF vs. rehab  Continue supportive care    Shea Monteiro, APRN  11/26/23  07:43 EST

## 2023-11-27 LAB
ALBUMIN SERPL-MCNC: 1.9 G/DL (ref 3.5–5.2)
ANION GAP SERPL CALCULATED.3IONS-SCNC: 13.7 MMOL/L (ref 5–15)
BASOPHILS # BLD AUTO: 0.01 10*3/MM3 (ref 0–0.2)
BASOPHILS NFR BLD AUTO: 0.1 % (ref 0–1.5)
BUN SERPL-MCNC: 54 MG/DL (ref 6–20)
BUN/CREAT SERPL: 10.5 (ref 7–25)
CALCIUM SPEC-SCNC: 9.1 MG/DL (ref 8.6–10.5)
CHLORIDE SERPL-SCNC: 84 MMOL/L (ref 98–107)
CO2 SERPL-SCNC: 25.3 MMOL/L (ref 22–29)
CREAT SERPL-MCNC: 5.15 MG/DL (ref 0.57–1)
DEPRECATED RDW RBC AUTO: 41.4 FL (ref 37–54)
EGFRCR SERPLBLD CKD-EPI 2021: 10.8 ML/MIN/1.73
EOSINOPHIL # BLD AUTO: 0.13 10*3/MM3 (ref 0–0.4)
EOSINOPHIL NFR BLD AUTO: 1.8 % (ref 0.3–6.2)
ERYTHROCYTE [DISTWIDTH] IN BLOOD BY AUTOMATED COUNT: 14 % (ref 12.3–15.4)
GLUCOSE BLDC GLUCOMTR-MCNC: 111 MG/DL (ref 70–130)
GLUCOSE BLDC GLUCOMTR-MCNC: 182 MG/DL (ref 70–130)
GLUCOSE BLDC GLUCOMTR-MCNC: 184 MG/DL (ref 70–130)
GLUCOSE BLDC GLUCOMTR-MCNC: 82 MG/DL (ref 70–130)
GLUCOSE SERPL-MCNC: 79 MG/DL (ref 65–99)
HCT VFR BLD AUTO: 30.2 % (ref 34–46.6)
HGB BLD-MCNC: 9.6 G/DL (ref 12–15.9)
IMM GRANULOCYTES # BLD AUTO: 0.1 10*3/MM3 (ref 0–0.05)
IMM GRANULOCYTES NFR BLD AUTO: 1.4 % (ref 0–0.5)
LYMPHOCYTES # BLD AUTO: 0.53 10*3/MM3 (ref 0.7–3.1)
LYMPHOCYTES NFR BLD AUTO: 7.3 % (ref 19.6–45.3)
MCH RBC QN AUTO: 26.1 PG (ref 26.6–33)
MCHC RBC AUTO-ENTMCNC: 31.8 G/DL (ref 31.5–35.7)
MCV RBC AUTO: 82.1 FL (ref 79–97)
MONOCYTES # BLD AUTO: 0.93 10*3/MM3 (ref 0.1–0.9)
MONOCYTES NFR BLD AUTO: 12.7 % (ref 5–12)
NEUTROPHILS NFR BLD AUTO: 5.61 10*3/MM3 (ref 1.7–7)
NEUTROPHILS NFR BLD AUTO: 76.7 % (ref 42.7–76)
NRBC BLD AUTO-RTO: 0 /100 WBC (ref 0–0.2)
PHOSPHATE SERPL-MCNC: 4.1 MG/DL (ref 2.5–4.5)
PLATELET # BLD AUTO: 203 10*3/MM3 (ref 140–450)
PMV BLD AUTO: 10.9 FL (ref 6–12)
POTASSIUM SERPL-SCNC: 4.4 MMOL/L (ref 3.5–5.2)
RBC # BLD AUTO: 3.68 10*6/MM3 (ref 3.77–5.28)
SODIUM SERPL-SCNC: 123 MMOL/L (ref 136–145)
WBC NRBC COR # BLD AUTO: 7.31 10*3/MM3 (ref 3.4–10.8)

## 2023-11-27 PROCEDURE — 82948 REAGENT STRIP/BLOOD GLUCOSE: CPT

## 2023-11-27 PROCEDURE — 99231 SBSQ HOSP IP/OBS SF/LOW 25: CPT | Performed by: NURSE PRACTITIONER

## 2023-11-27 PROCEDURE — 97530 THERAPEUTIC ACTIVITIES: CPT

## 2023-11-27 PROCEDURE — 25010000002 ONDANSETRON PER 1 MG

## 2023-11-27 PROCEDURE — 25010000002 METOCLOPRAMIDE PER 10 MG: Performed by: INTERNAL MEDICINE

## 2023-11-27 PROCEDURE — 25010000002 LACOSAMIDE 200 MG/20ML SOLUTION: Performed by: NURSE PRACTITIONER

## 2023-11-27 PROCEDURE — 97530 THERAPEUTIC ACTIVITIES: CPT | Performed by: PHYSICAL THERAPIST

## 2023-11-27 PROCEDURE — C9254 INJECTION, LACOSAMIDE: HCPCS | Performed by: NURSE PRACTITIONER

## 2023-11-27 PROCEDURE — 85025 COMPLETE CBC W/AUTO DIFF WBC: CPT | Performed by: INTERNAL MEDICINE

## 2023-11-27 PROCEDURE — 99232 SBSQ HOSP IP/OBS MODERATE 35: CPT | Performed by: NURSE PRACTITIONER

## 2023-11-27 PROCEDURE — 25010000002 HEPARIN (PORCINE) PER 1000 UNITS: Performed by: INTERNAL MEDICINE

## 2023-11-27 PROCEDURE — 63710000001 INSULIN REGULAR HUMAN PER 5 UNITS: Performed by: NURSE PRACTITIONER

## 2023-11-27 PROCEDURE — 97110 THERAPEUTIC EXERCISES: CPT

## 2023-11-27 PROCEDURE — 25010000002 EPOETIN ALFA-EPBX 3000 UNIT/ML SOLUTION: Performed by: INTERNAL MEDICINE

## 2023-11-27 PROCEDURE — 80069 RENAL FUNCTION PANEL: CPT | Performed by: INTERNAL MEDICINE

## 2023-11-27 RX ADMIN — INSULIN HUMAN 2 UNITS: 100 INJECTION, SOLUTION PARENTERAL at 12:23

## 2023-11-27 RX ADMIN — EPOETIN ALFA-EPBX 5000 UNITS: 3000 INJECTION, SOLUTION INTRAVENOUS; SUBCUTANEOUS at 09:03

## 2023-11-27 RX ADMIN — METOCLOPRAMIDE 5 MG: 5 INJECTION, SOLUTION INTRAMUSCULAR; INTRAVENOUS at 17:54

## 2023-11-27 RX ADMIN — ACETAMINOPHEN 650 MG: 160 SOLUTION ORAL at 22:16

## 2023-11-27 RX ADMIN — ACETAMINOPHEN 650 MG: 160 SOLUTION ORAL at 04:15

## 2023-11-27 RX ADMIN — HEPARIN SODIUM: 5000 INJECTION INTRAVENOUS; SUBCUTANEOUS at 22:01

## 2023-11-27 RX ADMIN — CARBIDOPA AND LEVODOPA 2.5 MG: 50; 200 TABLET, EXTENDED RELEASE ORAL at 17:53

## 2023-11-27 RX ADMIN — CARVEDILOL 25 MG: 25 TABLET, FILM COATED ORAL at 08:57

## 2023-11-27 RX ADMIN — METOCLOPRAMIDE 5 MG: 5 INJECTION, SOLUTION INTRAMUSCULAR; INTRAVENOUS at 06:37

## 2023-11-27 RX ADMIN — METOCLOPRAMIDE 5 MG: 5 INJECTION, SOLUTION INTRAMUSCULAR; INTRAVENOUS at 12:23

## 2023-11-27 RX ADMIN — HEPARIN SODIUM: 5000 INJECTION INTRAVENOUS; SUBCUTANEOUS at 11:12

## 2023-11-27 RX ADMIN — CARVEDILOL 25 MG: 25 TABLET, FILM COATED ORAL at 22:13

## 2023-11-27 RX ADMIN — INSULIN HUMAN 2 UNITS: 100 INJECTION, SOLUTION PARENTERAL at 17:53

## 2023-11-27 RX ADMIN — LACOSAMIDE 100 MG: 10 INJECTION INTRAVENOUS at 15:46

## 2023-11-27 RX ADMIN — ESCITALOPRAM OXALATE 10 MG: 10 TABLET, FILM COATED ORAL at 08:57

## 2023-11-27 RX ADMIN — LACOSAMIDE 100 MG: 10 INJECTION INTRAVENOUS at 03:53

## 2023-11-27 RX ADMIN — CARBIDOPA AND LEVODOPA 2.5 MG: 50; 200 TABLET, EXTENDED RELEASE ORAL at 12:23

## 2023-11-27 RX ADMIN — METOCLOPRAMIDE 5 MG: 5 INJECTION, SOLUTION INTRAMUSCULAR; INTRAVENOUS at 01:38

## 2023-11-27 RX ADMIN — HEPARIN SODIUM: 5000 INJECTION INTRAVENOUS; SUBCUTANEOUS at 15:46

## 2023-11-27 RX ADMIN — CARBIDOPA AND LEVODOPA 2.5 MG: 50; 200 TABLET, EXTENDED RELEASE ORAL at 08:57

## 2023-11-27 RX ADMIN — HYDROXYCHLOROQUINE SULFATE 200 MG: 200 TABLET ORAL at 08:57

## 2023-11-27 RX ADMIN — ONDANSETRON 4 MG: 2 INJECTION INTRAMUSCULAR; INTRAVENOUS at 22:34

## 2023-11-27 RX ADMIN — ASPIRIN 81 MG: 81 TABLET, CHEWABLE ORAL at 08:57

## 2023-11-27 RX ADMIN — PANTOPRAZOLE SODIUM 40 MG: 40 INJECTION, POWDER, FOR SOLUTION INTRAVENOUS at 06:37

## 2023-11-27 NOTE — NURSING NOTE
[facility] attempted follow-up regarding depression; however, she was sleeping soundly and did not respond to voice.  RN reports no changes.  She continues on Lexapro.    Access following.

## 2023-11-27 NOTE — THERAPY RE-EVALUATION
Patient Name: Dee Herrera  : 1990    MRN: [id number]                              Today's Date: 2023       Admit Date: 10/26/2023    Visit Dx:     ICD-10-CM ICD-9-CM   1. Shortness of breath  R06.02 786.05   2. ESRD (end stage renal disease) on dialysis  N18.6 585.6    Z99.2 V45.11   3. Hyponatremia  E87.1 276.1   4. Generalized abdominal pain  R10.84 789.07   5. Elevated lactic acid level  R79.89 276.2   6. Elevated troponin  R79.89 790.6   7. Severe aortic valve regurgitation  I35.1 424.1   8. Pericardial effusion  I31.39 423.9   9. 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 syndrome    Systemic lupus erythematosus    Lupus nephritis, ISN/RPS class IV    Hypokalemia    Hypocalcemia    COVID-19    Hospital discharge follow-up    Stage 5 chronic kidney disease    Cardiac cirrhosis    Pancreatitis    Duodenitis    Regional enteritis of small bowel    Pericardial effusion    End stage renal disease on dialysis    Hemodialysis status    Seizure disorder    Elevated liver function tests    C. difficile colitis    Anemia, chronic disease    Essential hypertension    Peritoneal dialysis catheter in place    Anemia due to chronic kidney disease, on chronic dialysis    Alternating constipation and diarrhea    Abnormal stress test    Hyponatremia    Poor appetite    Moderate malnutrition    Chronic diastolic CHF (congestive heart failure)    Aortic valve lesion    Severe aortic valve regurgitation    Dissecting ascending aortic aneurysm    Recent cerebrovascular accident (CVA)     Past Medical History:   Diagnosis Date    Anasarca     PER CT SCAN    Dry skin      ESRD (end stage renal disease) on dialysis     MARCO COLE, KATIE CHAVIRA HWDRAKE    History of abdominal pain     History of anemia     History of transfusion     Hypertension     Iron deficiency anemia 09/27/2021    Lupus (systemic lupus erythematosus) 07/30/2022    Migraine     Other specified nutritional anemias     Pericardial effusion     Renal insufficiency     Seizures     STATES LAST WAS 1/2023    Shortness of breath     OCCASIONAL    Vitamin D deficiency 09/27/2021     Past Surgical History:   Procedure Laterality Date    ASCENDING AORTIC ANEURYSM REPAIR W/ MECHANICAL AORTIC VALVE REPLACEMENT N/A 11/2/2023    Procedure: CHETNA STERNOTOMY, AORTIC ROOT REPLACEMENT WITH VALVE SPARING MISHEL PROCEDURE, REPLACEMENT OF ASCENDING AORTA, RIGHT FEMORAL DIALYSIS CATHETER PLACEMENT AND PRP;  Surgeon: Chris Medina MD;  Location: [facility] CVOR;  Service: Cardiothoracic;  Laterality: N/A;    CARDIAC CATHETERIZATION N/A 06/14/2023    Procedure: Coronary angiography;  Surgeon: Juana Taylor MD;  Location: [facility] CATH INVASIVE LOCATION;  Service: Cardiovascular;  Laterality: N/A;    CARDIAC CATHETERIZATION N/A 06/14/2023    Procedure: Left heart cath;  Surgeon: Juana Taylor MD;  Location: [facility] CATH INVASIVE LOCATION;  Service: Cardiovascular;  Laterality: N/A;    CARDIAC CATHETERIZATION N/A 06/14/2023    Procedure: Right Heart Cath;  Surgeon: Juana Taylor MD;  Location: [facility] CATH INVASIVE LOCATION;  Service: Cardiovascular;  Laterality: N/A;    COLONOSCOPY N/A 7/20/2023    Procedure: COLONOSCOPY to cecum with biopsy;  Surgeon: Drew Kaminski MD;  Location: [facility] ENDOSCOPY;  Service: Gastroenterology;  Laterality: N/A;  PRE - diarrhea, constipation  POST - fair prep, normal    CORONARY ARTERY BYPASS GRAFT N/A 11/6/2023    Procedure: STERNAL EXPLORATION AND WASH OUT;  Surgeon: Jr Mitesh Quiroz MD;  Location: [facility] CVOR;  Service: Cardiothoracic;  Laterality: N/A;    ENDOSCOPY N/A 7/20/2023     Procedure: ESOPHAGOGASTRODUODENOSCOPY with biopsy;  Surgeon: Drew Kaminski MD;  Location: [facility] ENDOSCOPY;  Service: Gastroenterology;  Laterality: N/A;  PRE - abn ct abd  POST - gastritis    INSERTION HEMODIALYSIS CATHETER N/A 07/26/2022    Procedure: RIGHT TUNNELED DIALYSIS CATHETER PLACEMENT;  Surgeon: Diandra Adhikari MD;  Location: [facility] MAIN OR;  Service: Vascular;  Laterality: N/A;    INSERTION PERITONEAL DIALYSIS CATHETER N/A 04/03/2023    Procedure: INSERTION PERITONEAL DIALYSIS CATHETER LAPAROSCOPIC, omentumpexy;  Surgeon: Jemal Loyola MD;  Location: [facility] MAIN OR;  Service: General;  Laterality: N/A;    TONSILLECTOMY        General Information       Row Name 11/27/23 1715          Physical Therapy Time and Intention    Document Type therapy note (daily note)  -     Mode of Treatment individual therapy;physical therapy  -               User Key  (r) = Recorded By, (t) = Taken By, (c) = Cosigned By      Initials Name Provider Type     Mery Macias, PT Physical Therapist                   Mobility       Row Name 11/27/23 1715          Bed Mobility    Supine-Sit [place] (Bed Mobility) minimum assist (75% patient effort);verbal cues  -     Sit-Supine [place] (Bed Mobility) contact guard  -     Assistive Device (Bed Mobility) head of bed elevated;bed rails  -       Row Name 11/27/23 1715          Sit-Stand Transfer    Sit-Stand [place] (Transfers) minimum assist (75% patient effort)  -     Assistive Device (Sit-Stand Transfers) walker, front-wheeled  -       Row Name 11/27/23 1715          Gait/Stairs (Locomotion)    [place] Level (Gait) contact guard  -     Assistive Device (Gait) walker, front-wheeled  -     Distance in Feet (Gait) 30 ft  -     Deviations/Abnormal Patterns (Gait) amrita decreased;gait speed decreased;stride length decreased  -               User Key  (r) = Recorded By, (t) = Taken By, (c) = Cosigned By      Initials  Name Provider Type    Mery Garcia, PT Physical Therapist                   Obj/Interventions       Row Name 11/27/23 1722          Range of Motion Comprehensive    General Range of Motion no range of motion deficits identified  -       Row Name 11/27/23 1722          Strength Comprehensive (MMT)    Comment, General Manual Muscle Testing (MMT) Assessment generalized weakness, functionally at least 3/5  -       Row Name 11/27/23 1722          Balance    Static Sitting Balance standby assist  -     Position, Sitting Balance unsupported;sitting edge of bed  -     Static Standing Balance contact guard  -     Dynamic Standing Balance contact guard;minimal assist  -KH     Position/Device Used, Standing Balance walker, front-wheeled  -               User Key  (r) = Recorded By, (t) = Taken By, (c) = Cosigned By      Initials Name Provider Type    Mery Garcia, PT Physical Therapist                   Goals/Plan       Row Name 11/27/23 1720          Bed Mobility Goal 1 (PT)    Activity/Assistive Device (Bed Mobility Goal 1, PT) bed mobility activities, all  -KH     [place] Level/Cues Needed (Bed Mobility Goal 1, PT) standby assist  -KH     Time Frame (Bed Mobility Goal 1, PT) 2 weeks  -       Row Name 11/27/23 1720          Transfer Goal 1 (PT)    Activity/Assistive Device (Transfer Goal 1, PT) transfers, all  -KH     [place] Level/Cues Needed (Transfer Goal 1, PT) standby assist  -KH     Time Frame (Transfer Goal 1, PT) 2 weeks  -       Row Name 11/27/23 1720          Gait Training Goal 1 (PT)    Activity/Assistive Device (Gait Training Goal 1, PT) gait (walking locomotion)  -     [place] Level (Gait Training Goal 1, PT) standby assist  -KH     Distance (Gait Training Goal 1, PT) 100 ft  -     Time Frame (Gait Training Goal 1, PT) 2 weeks  -       Row Name 11/27/23 1720          Patient Education Goal (PT)    Activity (Patient Education Goal, PT) HEP  -      [place]/Cues/Accuracy (Memory Goal 2, PT) demonstrates adequately  -     Time Frame (Patient Education Goal, PT) 2 weeks  -               User Key  (r) = Recorded By, (t) = Taken By, (c) = Cosigned By      Initials Name Provider Type    Mery Garcia, PT Physical Therapist                   Clinical Impression       Row Name 11/27/23 1716          Pain    Pretreatment Pain Rating 0/10 - no pain  -     Posttreatment Pain Rating 0/10 - no pain  -       Row Name 11/27/23 1716          Plan of Care Review    Outcome Evaluation Pt was agreeable to therapy and was able to walk to hallway outside of her room today with Rwx. gait is slow with small steps and narrow JOSLYN. Pt fatigued quickly and asked to return to bed. Will continue to progress as tolerated.  -       Row Name 11/27/23 1716          Positioning and Restraints    Pre-Treatment Position in bed  -     Post Treatment Position bed  -     In Bed fowlers;call light within reach;encouraged to call for assist;exit alarm on;with nsg  RN in room  -               User Key  (r) = Recorded By, (t) = Taken By, (c) = Cosigned By      Initials Name Provider Type    Mery Garcia, PT Physical Therapist                   Outcome Measures       Row Name 11/27/23 1719 11/27/23 0857       How much help from another person do you currently need...    Turning from your back to your side while in flat bed without using bedrails? 3  -KH 4  -KE    Moving from lying on back to sitting on the side of a flat bed without bedrails? 3  -KH 3  -KE    Moving to and from a bed to a chair (including a wheelchair)? 3  -KH 3  -KE    Standing up from a chair using your arms (e.g., wheelchair, bedside chair)? 3  -KH 3  -KE    Climbing 3-5 steps with a railing? 2  -KH 2  -KE    To walk in hospital room? 3  -KH 3  -KE    AM-PAC 6 Clicks Score (PT) 17  -KH 18  -KE    Highest Level of Mobility Goal 5 --> Static standing  -KH 6 --> Walk 10 steps or more  -KE       Row Name 11/27/23 1719 11/27/23 1404       Functional Assessment    Outcome Measure Options AM-PAC 6 Clicks Basic Mobility (PT)  -YAIR AM-PAC 6 Clicks Daily Activity (OT)  -              User Key  (r) = Recorded By, (t) = Taken By, (c) = Cosigned By      Initials Name Provider Type    Mery Garcia, PT Physical Therapist    Etta Maya, OT Occupational Therapist    Ligia Wiley, RN Registered Nurse                                 Physical Therapy Education       Title: PT OT SLP Therapies (In Progress)       Topic: Physical Therapy (Done)       Point: Mobility training (Done)       Learning Progress Summary             Patient Acceptance, E,TB,D, VU,NR by  at 11/24/2023 1535    Acceptance, E, VU by SK at 11/21/2023 1609    Acceptance, E,TB,D, VU by SK at 11/20/2023 1237    Acceptance, E, NR by  at 11/19/2023 1424    Acceptance, E,TB,D, VU by SK at 11/17/2023 1633    Acceptance, E,D, NR by  at 11/16/2023 1618    Acceptance, E,TB,D, VU by SK at 11/15/2023 1521    Acceptance, E, VU,NR by SK at 11/13/2023 1247    Acceptance, E,TB,D, VU,NR by  at 11/12/2023 1148   Family Acceptance, E, VU by SK at 11/21/2023 1609                         Point: Home exercise program (Done)       Learning Progress Summary             Patient Acceptance, E,TB,D, VU,NR by  at 11/24/2023 1535    Acceptance, E, VU by SK at 11/21/2023 1609    Acceptance, E,TB,D, VU by SK at 11/20/2023 1237    Acceptance, E, NR by  at 11/19/2023 1424    Acceptance, E,TB,D, VU by SK at 11/17/2023 1633    Acceptance, E,D, NR by  at 11/16/2023 1618    Acceptance, E,TB,D, VU by SK at 11/15/2023 1521    Acceptance, E,TB,D, VU,NR by  at 11/12/2023 1148   Family Acceptance, E, VU by SK at 11/21/2023 1609                         Point: Body mechanics (Done)       Learning Progress Summary             Patient Acceptance, E,TB,D, VU,NR by ELICIA at 11/24/2023 1535    Acceptance, E, VU by SK at 11/21/2023 1609    Acceptance,  E,TB,D, VU by SK at 11/20/2023 1237    Acceptance, E, NR by  at 11/19/2023 1424    Acceptance, E,TB,D, VU by SK at 11/17/2023 1633    Acceptance, E,D, NR by  at 11/16/2023 1618    Acceptance, E,TB,D, VU by SK at 11/15/2023 1521    Acceptance, E, VU,NR by SK at 11/13/2023 1247    Acceptance, E,TB,D, VU,NR by  at 11/12/2023 1148   Family Acceptance, E, VU by SK at 11/21/2023 1609                         Point: Precautions (Done)       Learning Progress Summary             Patient Acceptance, E, VU by SK at 11/21/2023 1609    Acceptance, E,TB,D, VU by SK at 11/20/2023 1237    Acceptance, E, NR by  at 11/19/2023 1424    Acceptance, E,TB,D, VU by SK at 11/17/2023 1633    Acceptance, E,D, NR by  at 11/16/2023 1618    Acceptance, E,TB,D, VU by SK at 11/15/2023 1521    Acceptance, E, VU,NR by SK at 11/13/2023 1247    Acceptance, E,TB,D, VU,NR by  at 11/12/2023 1148   Family Acceptance, E, VU by SK at 11/21/2023 1609                                         User Key       Initials Effective Dates Name Provider Type Discipline     06/16/21 -  Kim Almendarez, PT Physical Therapist PT     06/16/21 -  Elba Whaley PT Physical Therapist PT     06/16/21 -  Shea Ceja, PT Physical Therapist PT     08/25/23 -  Nae Chaudhary, RN Registered Nurse Nurse    SK 10/10/23 -  Supriya Harris, HYUN Student PT Student PT                  PT Recommendation and Plan     Outcome Evaluation: Pt was agreeable to therapy and was able to walk to hallway outside of her room today with Rwx. gait is slow with small steps and narrow JOSLYN. Pt fatigued quickly and asked to return to bed. Will continue to progress as tolerated.     Time Calculation:         PT Charges       Row Name 11/27/23 9778             Time Calculation    Start Time 1508  -KH      Stop Time 1520  -KH      Time Calculation (min) 12 min  -KH      PT Received On 11/27/23  -KH      PT - Next Appointment 11/28/23  -KH         Time Calculation- PT    Total  Timed Code Minutes- PT 12 minute(s)  -KH         Timed Charges    03031 - PT Therapeutic Activity Minutes 12  -KH         Total Minutes    Timed Charges Total Minutes 12  -KH       Total Minutes 12  -KH                User Key  (r) = Recorded By, (t) = Taken By, (c) = Cosigned By      Initials Name Provider Type    Mery Garcia, PT Physical Therapist                  Therapy Charges for Today       Code Description Service Date Service Provider Modifiers Qty    [id number] HC PT THERAPEUTIC ACT EA 15 MIN 11/27/2023 Mery Macias, PT GP 1            PT G-Codes  Outcome Measure Options: AM-PAC 6 Clicks Basic Mobility (PT)  AM-PAC 6 Clicks Score (PT): 17  AM-PAC 6 Clicks Score (OT): 12  Modified [place] Scale: 4 - Moderately severe disability.  Unable to walk without assistance, and unable to attend to own bodily needs without assistance.       Mery Macias, PT  11/27/2023

## 2023-11-27 NOTE — PROGRESS NOTES
[facility] Progress Note      Patient Name: Dee Herrera  : 1990  MRN: [id number]  Primary Care Physician:  Margarita Woods APRN  Date of admission: 10/26/2023    Subjective     Interval History:   F/u ESRD     Patient lying in bed  Somnolent    Patient refused 1 PD exchange last night  Her Dobbhoff catheter was plugged and unable to continue feeds    Patient mentioned to nursing staff that she is considering possible PEG tube placement and  in center hemodialysis ?  But unfortunately patient keeps on changing her mind    She is eating a couple of bites of food    Patient refused oral potassium requiring IV    Feeling tired and fatigued    Bowel movement present    Discussed with nursing staff        Review of Systems:     Review as above    Objective     Vitals:   Temp:  [98 °F (36.7 °C)-99.3 °F (37.4 °C)] 98.4 °F (36.9 °C)  Heart Rate:  [82-92] 82  Resp:  [16] 16  BP: ()/() 99/75    Intake/Output Summary (Last 24 hours) at 2023  Last data filed at 2023 1718  Gross per 24 hour   Intake 88596 ml   Output 69267 ml   Net 128 ml       Physical Exam:    General Appearance: frail cachectic AAF.  Severe bilateral temporal wasting  Head.  Dobbhoff catheter in place   Neck: supple, no JVD  Lungs: CTA bilat no rales  Heart: RRR, normal S1 and S2  Abdomen: soft, nontender, nondistended.  PD catheter in place with clean exit site  Extremities: no edema, cyanosis or clubbing  CNS : Diffuse muscle atrophy and muscle wasting.      Scheduled Meds:     aspirin, 81 mg, Nasogastric, Daily  carvedilol, 25 mg, Nasogastric, Q12H  chlorhexidine, 15 mL, Mouth/Throat, Q12H  Delflex-LC/2.5% Dextrose, 2,000 mL, Intraperitoneal, 6 Exchanges Daily (Q4H)  [START ON 2023] epoetin cari/cari-epbx, 5,000 Units, Subcutaneous, Once per day on   escitalopram, 10 mg, Nasogastric, Daily  First Mouthwash (Magic Mouthwash), 10 mL, Swish & Spit,  Q6H  hydroxychloroquine, 200 mg, Oral, Daily  insulin regular, 2-7 Units, Subcutaneous, Q6H  Lacosamide, 100 mg, Intravenous, Q12H  lactulose, 10 g, Nasogastric, TID  lidocaine, 10 mL, Subcutaneous, Once  metoclopramide, 5 mg, Intravenous, Q6H  midodrine, 2.5 mg, Oral, TID AC  mupirocin, , Each Nare, BID  mycophenolate, 250 mg, Oral, Q12H  pantoprazole, 40 mg, Intravenous, Q AM      IV Meds:   hold, 1 each        Results Reviewed:   I have personally reviewed the results from the time of this admission to 11/26/2023 19:43 EST     Results from last 7 days   Lab Units 11/26/23 0612 11/25/23 0552 11/24/23 0556   SODIUM mmol/L 127* 126* 127*   POTASSIUM mmol/L 3.2* 3.5 3.6   CHLORIDE mmol/L 85* 86* 87*   CO2 mmol/L 29.0 28.5 29.8*   BUN mg/dL 56* 48* 49*   CREATININE mg/dL 4.58* 4.78* 4.56*   CALCIUM mg/dL 9.2 8.7 8.8   GLUCOSE mg/dL 112* 106* 107*     Estimated Creatinine Clearance: 13.2 mL/min (A) (by C-G formula based on SCr of 4.58 mg/dL (H)).  Results from last 7 days   Lab Units 11/26/23 0612 11/25/23 0552 11/24/23  0556 11/23/23 0428 11/22/23  0550 11/21/23  1718   MAGNESIUM mg/dL  --   --  1.7  --  2.2 1.5*   PHOSPHORUS mg/dL 2.1* 2.7 2.6   < > 3.7  --     < > = values in this interval not displayed.         Results from last 7 days   Lab Units 11/26/23 0612 11/25/23  0552 11/24/23  0556 11/23/23 0428 11/22/23  0550   WBC 10*3/mm3 7.92 8.69 10.59 12.12* 12.10*   HEMOGLOBIN g/dL 10.2* 9.4* 9.6* 9.5* 10.1*   PLATELETS 10*3/mm3 212 182 200 199 235           Assessment / Plan     ASSESSMENT:  -. ESRD.  Currently on peritoneal dialysis.  Patient require temporary transition to hemodialysis currently back again on PD.  Patient continues to refuse hemodialysis   -.  Acute CVA cortical infarct in the anterior inferior medial right frontal lobe.  Subacute subdural hematoma right occipital.  -.  Ascending aortic aneurysm with subacute/chronic aortic root dissection.  Status post repair of proximal aortic aneurysm  11/2/2023.  Re- exploration for cardiac tamponade 11/6/2023.  -.  Seizure disorder currently on Vimpat  -.  Failure to thrive.  Dobbhoff malfunctioning, minimal oral intake Refuses PEG.  Alb 2  -.  SLE.  C3 mildly low, C4 normal.  Anti-double-stranded DNA antibody elevated 11/20/2023.  Plaquenil and CellCept resumed   -.  Anemia CKD,  continue patient protocol 5000 units SC  TIW   -.  Depression - on lexapro   -Hypokalemia ordered liquid potassium but patient refused switch to IV form follow potassium closely,   -Noncompliant with medical advice, dietary recommendations, medication, and peritoneal dialysis.  Patient was advised to improve compliance      PLAN:  Continue PD exchanges  Surgery and palliative medicine will have further discussion with the patient tomorrow, for possible PEG tube placement   Prognosis very poor and palliative care following    Thank you for allowing us to participate in this patient care    Discussed with nursing staff at bedside      Hair Mckeon MD  11/26/23  19:43 EST    [facility]  [phone]

## 2023-11-27 NOTE — PROGRESS NOTES
.            [facility]    Palliative Care Daily Progress Note   Chief complaint-follow up goals of care/advanced care planning and support for patient/family    Code Status:   Code Status and Medical Interventions:   Ordered at: 11/02/23 4184     Code Status (Patient has no pulse and is not breathing):    CPR (Attempt to Resuscitate)     Medical Interventions (Patient has pulse or is breathing):    Full Support      Advanced Directives: Advance Directive Status: Patient has advance directive, copy in chart   Goals of Care: Ongoing.     S: Medical record reviewed. Events noted.  Patient resting in bed. Reports being tired. No family present. I reviewed labs, medical notes, therapy notes and MAR. Per RN she has had more of appetite and worked with therapy. Spoke with SULMA Strong.              Review of Systems   Constitutional: Positive for decreased appetite and malaise/fatigue.   Cardiovascular:  Negative for chest pain.   Respiratory:  Negative for shortness of breath.    Gastrointestinal:  Negative for abdominal pain and nausea.   Neurological:  Positive for weakness.   Psychiatric/Behavioral:  Negative for depression. The patient is nervous/anxious.      Pain Assessment  Preferred Pain Scale: number (Numeric Rating Pain Scale)  Nonverbal Indicators of Pain: nonverbal indicators absent  CPOT Facial Expression: 0-->relaxed, neutral  CPOT Body Movements: 0-->absence of movements  CPOT Muscle Tension: 0-->relaxed  Ventilator Compliance/Vocalization: 0-->talking in normal tone or no sound  CPOT Score: 0  Pain Location: abdomen  Pain Description: cramping    O:     Intake/Output Summary (Last 24 hours) at 11/27/2023 1419  Last data filed at 11/27/2023 1113  Gross per 24 hour   Intake 4410 ml   Output 3500 ml   Net 910 ml       Diagnostics and current medications: Reviewed.    Current Facility-Administered Medications   Medication Dose Route Frequency Provider Last Rate Last Admin     acetaminophen (TYLENOL) tablet 650 mg  650 mg Oral Q4H PRN Shea Monteiro APRN   650 mg at 11/25/23 1111    Or    acetaminophen (TYLENOL) 160 MG/5ML oral solution 650 mg  650 mg Oral Q4H PRN Shea Monteiro APRN   650 mg at 11/27/23 0415    Or    acetaminophen (TYLENOL) suppository 650 mg  650 mg Rectal Q4H PRN Shea Monteiro APRN        aspirin chewable tablet 81 mg  81 mg Nasogastric Daily Bobby Carter MD   81 mg at 11/27/23 0857    bisacodyl (DULCOLAX) EC tablet 10 mg  10 mg Oral Daily PRN Shea Monteiro APRN   10 mg at 11/08/23 2052    bisacodyl (DULCOLAX) suppository 10 mg  10 mg Rectal Daily PRN Shea Monteiro APRN        carvedilol (COREG) tablet 25 mg  25 mg Nasogastric Q12H Bobby Carter MD   25 mg at 11/27/23 0857    chlorhexidine (PERIDEX) 0.12 % solution 15 mL  15 mL Mouth/Throat Q12H Shea Monteiro APRN   15 mL at 11/22/23 0820    Delflex-LC/4.25% Dextrose (DIANEAL) 2,000 mL with heparin (porcine) 5000 UNIT/ML 1,500 Units dialysis solution   Intraperitoneal Q5H EmilyIsaac ozuna MD   New Bag at 11/27/23 1112    dextrose (D50W) (25 g/50 mL) IV injection 25 g  25 g Intravenous Q15 Min PRN Delia Stern APRN   25 g at 11/08/23 0020    dextrose (GLUTOSE) oral gel 15 g  15 g Oral Q15 Min PRN Delia Stern APRN        epoetin cari-epbx (RETACRIT) injection 5,000 Units  5,000 Units Subcutaneous Once per day on Mon Wed Fri Hair Mckeon MD   5,000 Units at 11/27/23 0903    escitalopram (LEXAPRO) tablet 10 mg  10 mg Nasogastric Daily Bobby Carter MD   10 mg at 11/27/23 0857    First Mouthwash (Magic Mouthwash) 10 mL  10 mL Swish & Spit Q6H Delia Stern APRN   10 mL at 11/22/23 1630    glucagon (GLUCAGEN) injection 1 mg  1 mg Intramuscular Q15 Min PRN Delia Stern APRN        heparin (porcine) injection 3,000 Units  3,000 Units Intracatheter PRN Shea Monteiro APRN   3,000 Units at 11/13/23 1255    Hold medication  1 each Does not  apply Continuous PRN Delia Stern APRN        hydroxychloroquine (PLAQUENIL) tablet 200 mg  200 mg Oral Daily Katherine Caal APRN   200 mg at 11/27/23 0857    insulin regular (humuLIN R,novoLIN R) injection 2-7 Units  2-7 Units Subcutaneous Q6H Delia Stern APRN   2 Units at 11/27/23 1223    ipratropium-albuterol (DUO-NEB) nebulizer solution 3 mL  3 mL Nebulization Q4H PRN Shea Monteiro APRN   3 mL at 11/13/23 0711    lacosamide (VIMPAT) injection 100 mg  100 mg Intravenous Q12H Roseline Jordan APRN   100 mg at 11/27/23 0353    lactulose (CHRONULAC) 10 GM/15ML solution 10 g  10 g Nasogastric TID Regi Hyde MD   10 g at 11/22/23 2057    lidocaine (XYLOCAINE) 1 % injection 10 mL  10 mL Subcutaneous Once Payam Phillips MD        metoclopramide (REGLAN) injection 5 mg  5 mg Intravenous Q6H Bobby Carter MD   5 mg at 11/27/23 1223    midodrine (PROAMATINE) tablet 2.5 mg  2.5 mg Oral TID AC Hair Mckeon MD   2.5 mg at 11/27/23 1223    mupirocin (BACTROBAN) 2 % nasal ointment   Each Nare BID Shea Monteiro APRN   Given at 11/24/23 1000    mycophenolate (CELLCEPT) tablet 250 mg  250 mg Oral Q12H Katherine Caal APRN   250 mg at 11/24/23 2351    naloxone (NARCAN) injection 0.4 mg  0.4 mg Intravenous Q5 Min PRN Shea Monteiro APRN        nitroglycerin (NITROSTAT) SL tablet 0.4 mg  0.4 mg Sublingual Q5 Min PRN Shea Monteiro APRN   0.4 mg at 11/21/23 1714    ondansetron (ZOFRAN) injection 4 mg  4 mg Intravenous Q6H PRN Shea Monteiro APRN   4 mg at 11/20/23 1937    pantoprazole (PROTONIX) injection 40 mg  40 mg Intravenous Q AM Delia Stern APRN   40 mg at 11/27/23 0637    polyethylene glycol (MIRALAX) packet 17 g  17 g Oral Daily PRN Shea Monteiro APRN         hold, 1 each        acetaminophen **OR** acetaminophen **OR** acetaminophen    bisacodyl    bisacodyl    dextrose    dextrose    glucagon (human recombinant)    heparin (porcine)    hold    " ipratropium-albuterol    [] Morphine **AND** naloxone    nitroglycerin    ondansetron    polyethylene glycol  Attest that current medications reviewed including but not limited to prescriptions, over-the counter, herbals and vitamin/mineral/dietary (nutritional) supplements for name, route of administration, type, dose and frequency and are current using all immediate resources available at time of dictation.    A:    /84 (BP Location: Right arm, Patient Position: Lying)   Pulse 85   Temp 99.5 °F (37.5 °C) (Oral)   Resp 18   Ht 165 cm (64.96\")   Wt 46.7 kg (103 lb)   LMP 08/10/2022 (Approximate)   SpO2 100%   BMI 17.16 kg/m²     Constitutional:       Appearance: Not in distress. Frail. Chronically ill-appearing.   Pulmonary:      Effort: Pulmonary effort is normal.      Breath sounds: Normal breath sounds.   Cardiovascular:      PMI at left midclavicular line. Normal rate. Regular rhythm.      Murmurs: There is no murmur.   Edema:     Peripheral edema absent.   Abdominal:      General: Bowel sounds are normal.      Palpations: Abdomen is soft.      Tenderness: There is no abdominal tenderness.   Neurological:      Mental Status: Alert and oriented to person, place, and time.   Psychiatric:         Mood and Affect: Affect is flat.         Speech: Speech normal.         Behavior: Behavior is cooperative.         Thought Content: Thought content normal.         Cognition and Memory: Cognition normal.         Judgment: Judgment normal.         Patient status: Disease state: Controlled with current treatments.  Functional status: Palliative Performance Scale Score: Performance 60% based on the following measures: Ambulation: Reduced, Activity and Evidence of Disease: Unable to do hobby or some work, significant evidence of disease, Self-Care: Occasional assist necessary,  Intake: Normal or reduced, LOC: Full or confusion   Nutritional status: Albumin 2.4 on 2023 Body mass index is 17.92 " kg/m².    Family support: The patient receives support from her mother and extended family..  Advance Directives: Advance Directive Status: Patient has advance directive, copy in chart   POA/Healthcare surrogate-mother (Joselin ) and sister (Radha).     Impression/Problem List:      Dissecting ascending aortic aneurysm s/p repair   Acute CVA   ESRD on PD    Cardiac tamponade  Seizure disorder  Malnutrition   SLE  Anemia of chronic kidney disease   32      Recommendations/Plan:  1. Provide support with goals of care        2.  Palliative care encounter  11/22/2023- I spoke with with patient regarding goals of care conversation. Of note, it was difficult to get her to talk,however she was agreeable. She does have a living will on file that designates her mother then sister as healthcare surrogates. She resides at home. She has the support of her mother, grandmother, and siblings. She has fair understanding of her medical conditions, which we reviewed her hospital course in detail. She also shared with me that she had just recently started peritoneal dialysis prior to hospitalization. She reports being independent prior to hospitalization. She expresses frustration with prolonged hospitalization and not feeling well and weak and wanting to get better. We discussed the things she has to do to have some improvement like eating, participating in therapy. We also revisited her decision regarding PEG placement. She is made aware that coretrak is NOT long term treatment and if she doesn't improve her oral intake she will need to reconsider PEG placement if her goal is to improve. We also discussed palliative care, [facility], [facility] and comfort care and she isn't interested in them. She is aware of guarded/poor prognosis as well. We also discussed CODE status and medical interventions and she wishes to be FULL code.  I did ask about depression and anxiety and she denies. I offered access center support and she refused. We also  discussed the importance of family and their presence while inpatient. She doesn't feel like it makes much difference. Of note, her RN said family visited last night and she seemed to do better afterwards At the end of conversation, I reiterated the importance of her nutrition and involvement in her care (PT/OT), etc to get better so that she return to home setting if that is her wishes and that if she doesn't then she will have increased for complications and progressive deterioration.   I will plan to follow up on Monday and see how she does over the next few days. I spoke with SULMA Valdivia    11/27/2023- I had follow up conversation with patient. She remains reluctant to wanting PEG placed due to having to transition to hemodialysis. She is wanting to get better, feel better and gain her independence so that she can return home. We discussed the importance of nutritional intake and participation with therapy.  She has had some visits from her family over the last couple days, which she state have been helpful. She continues to deny depression, but just feeling overwhelmed with decision making and prolonged hospitalization. She did request soup for dinner which I spoke with dietary for changes. She is aware of her diet restriction- 1000 ml fluid/soft/chopped. I did provide her information regarding palliative care, [facility] and [facility]. She will think about [facility] support and let me know or call them once she is discharged. I spoke with SULMA Strong.       Thank you for this consult and allowing us to participate in patient's plan of care. Palliative Care Team will sign off and see PRN.        Time spent:30 minutes spent reviewing medical and medication records, assessing and examining patient, discussing with family, answering questions, providing some guidance about a plan and documentation of care, and coordinating care with other healthcare members, with > 50% time spent face to face.       Narcisa Lara,  APRN  11/27/2023  14:19 EST

## 2023-11-27 NOTE — PLAN OF CARE
Goal Outcome Evaluation:  Plan of Care Reviewed With: patient        Progress: no change  Outcome Evaluation: Having trouble with draining last night, only 500cc out with pink color. Nephrologist made aware, hold on PD at that time. Pt c/o abd pain and cramping, Tylenol given. 3 BMs this shift. Still refusing PO meds. Rested well.

## 2023-11-27 NOTE — PROGRESS NOTES
Hospital Follow Up    LOS:  LOS: 32 days   Patient Name: Dee Herrera  Age/Sex: 33 y.o. female  : 1990  MRN: [id number]    Day of Service: 23   Length of Stay: 32  Encounter Provider: CAESAR Michaels  Place of Service: [facility] CARDIOLOGY  Patient Care Team:  Margarita Woods APRN as PCP - General (Nurse Practitioner)  Winnie Sanchez MD as Referring Physician (Obstetrics and Gynecology)  Norberto Almaraz MD PhD as Consulting Physician (Hematology and Oncology)  Lupis Thomas MD as Consulting Physician (Nephrology)  Hair Mckeon MD as Consulting Physician (Nephrology)  Juana aTylor MD as Consulting Physician (Cardiology)    Subjective:     Chief Complaint: follow up aortic aneurysm/dissection, severe AR    Interval History: No complaints of chest pain or SOA this morning.   Objective:     Objective:  Temp:  [97.9 °F (36.6 °C)-98.6 °F (37 °C)] 98.1 °F (36.7 °C)  Heart Rate:  [75-82] 75  Resp:  [18] 18  BP: ()/(61-92) 102/72     Intake/Output Summary (Last 24 hours) at 2023 0935  Last data filed at 2023 1718  Gross per 24 hour   Intake 4620 ml   Output 4750 ml   Net -130 ml     Body mass index is 17.16 kg/m².      23  0316 23  0527 23  0635   Weight: 46.9 kg (103 lb 4.8 oz) 46.9 kg (103 lb 5.3 oz) 46.7 kg (103 lb)     Weight change: -0.15 kg (-5.3 oz)    Physical Exam:   General Appearance:    Awake alert and oriented, flat affect and withdrawn   Color:  Skin:  Neuro:  HEENT:    Lungs:     Pink  Warm and dry  No focal, motor or sensory deficits  Neck supple, pupils equal, round and reactive. No JVD, No Bruit  Clear to auscultation,respirations regular, even and                  unlabored    Heart:    Regular rate and rhythm, S1 and S2, + sys murmur, no gallop, no rub. No edema, DP/PT pulses are 2+   Chest Wall:    Midsternal incision clean and dry   Abdomen:     Normal bowel sounds, no masses, no  "organomegaly, soft        non-tender, non-distended, no guarding, no ascites noted   Extremities:   Moves all extremities well, no edema, no cyanosis, no redness       Lab Review:   Results from last 7 days   Lab Units 11/27/23  0704 11/26/23  0612   SODIUM mmol/L 123* 127*   POTASSIUM mmol/L 4.4 3.2*   CHLORIDE mmol/L 84* 85*   CO2 mmol/L 25.3 29.0   BUN mg/dL 54* 56*   CREATININE mg/dL 5.15* 4.58*   GLUCOSE mg/dL 79 112*   CALCIUM mg/dL 9.1 9.2     Results from last 7 days   Lab Units 11/22/23  0550 11/21/23  2114 11/21/23  1718   HSTROP T ng/L 438* 417* 407*     Results from last 7 days   Lab Units 11/27/23  0704 11/26/23  0612   WBC 10*3/mm3 7.31 7.92   HEMOGLOBIN g/dL 9.6* 10.2*   HEMATOCRIT % 30.2* 31.5*   PLATELETS 10*3/mm3 203 212         Results from last 7 days   Lab Units 11/24/23  0556 11/22/23  0550   MAGNESIUM mg/dL 1.7 2.2           Invalid input(s): \"LDLCALC\"      Results from last 7 days   Lab Units 11/21/23  0401   TSH uIU/mL 4.220*       I reviewed the patient's new clinical results.  I personally viewed and interpreted the patient's EKG  Current Medications:   Scheduled Meds:aspirin, 81 mg, Nasogastric, Daily  carvedilol, 25 mg, Nasogastric, Q12H  chlorhexidine, 15 mL, Mouth/Throat, Q12H  Delflex-LC/4.25% Dextrose (DIANEAL) 2,000 mL with heparin (porcine) 5000 UNIT/ML 1,500 Units dialysis solution, , Intraperitoneal, Q5H  epoetin cari/cari-epbx, 5,000 Units, Subcutaneous, Once per day on Mon Wed Fri  escitalopram, 10 mg, Nasogastric, Daily  First Mouthwash (Magic Mouthwash), 10 mL, Swish & Spit, Q6H  hydroxychloroquine, 200 mg, Oral, Daily  insulin regular, 2-7 Units, Subcutaneous, Q6H  Lacosamide, 100 mg, Intravenous, Q12H  lactulose, 10 g, Nasogastric, TID  lidocaine, 10 mL, Subcutaneous, Once  metoclopramide, 5 mg, Intravenous, Q6H  midodrine, 2.5 mg, Oral, TID AC  mupirocin, , Each Nare, BID  mycophenolate, 250 mg, Oral, Q12H  pantoprazole, 40 mg, Intravenous, Q AM      Continuous " Infusions:hold, 1 each        Allergies:  Allergies   Allergen Reactions    Minoxidil Hives and Other (See Comments)     Pericardial effusion .       Assessment:       Dissecting ascending aortic aneurysm    Vitamin D deficiency    Thrombocytopenia    Hypertension secondary to other renal disorders    Pancytopenia    Systemic lupus erythematosus    Pericardial effusion    End stage renal disease on dialysis    Seizure disorder    Elevated liver function tests    Essential hypertension    Peritoneal dialysis catheter in place    Anemia due to chronic kidney disease, on chronic dialysis    Hyponatremia    Poor appetite    Moderate malnutrition    Chronic diastolic CHF (congestive heart failure)    Aortic valve lesion    Severe aortic valve regurgitation    Recent cerebrovascular accident (CVA)        Plan:   Ascending aortic aneurysm with subacute/chronic aortic root dissection: s/p repair and proximal aortic replacement on 11/2.   Severe aortic regurgitation: s/p repair on 11/2.   Cardiac tamponade: secondary to pericardial thrombus anteriorly and compressing right ventricle. S/p reexploration with clot removal and treatment of a small amount of bleeding at the suture line on 11/6.  Cardiogenic shock: due to #3. Resolved  Seizures: postoperatively. EEG yesterday normal.   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 On PD at home. HD postoperatively, last session on 11/13. Now switched back to PD.  Hyponatremia: resolved  HTN: blood pressure well controlled. On amlodipine and carvedilol. Losartan added yesterday.  Chronic anemia: stable  SLE  Depression: Access center has seen patient. At this point, she denies any depression. She was started on escitalopram.    Right MCA CVA: confirmed by MRI. Neurology expects full recovery.      -She is a little more responsive this morning, reports feeling groggy. Answers questions appropriately and is more communicative which is an improvement.  -Encouraged to try to increase PO intake  and work with PT.   -No changes from a cardiac standpoint.      CAESAR Michaels  11/27/23  09:35 EST  Electronically signed by CAESAR Damon, 11/15/23, 9:03 AM EST.

## 2023-11-27 NOTE — PROGRESS NOTES
" LOS: 32 days   Patient Care Team:  Margarita Woods APRN as PCP - General (Nurse Practitioner)  Winnie Sanchez MD as Referring Physician (Obstetrics and Gynecology)  Norberto Almaraz MD PhD as Consulting Physician (Hematology and Oncology)  Lupis Thomas MD as Consulting Physician (Nephrology)  Hair Mckeon MD as Consulting Physician (Nephrology)  Juana Taylor MD as Consulting Physician (Cardiology)    Chief Complaint: Post op    Subjective:  Symptoms:  Stable.  No shortness of breath, cough or chest pain.    Diet:  Adequate intake (TF).  No nausea or vomiting.    Activity level: Impaired due to weakness.    Pain:  She reports no pain.        Vital Signs  Temp:  [97.9 °F (36.6 °C)-98.6 °F (37 °C)] 98.1 °F (36.7 °C)  Heart Rate:  [75-82] 75  Resp:  [18] 18  BP: ()/(61-92) 102/72  Body mass index is 17.16 kg/m².    Intake/Output Summary (Last 24 hours) at 11/27/2023 0918  Last data filed at 11/26/2023 1718  Gross per 24 hour   Intake 4620 ml   Output 4750 ml   Net -130 ml     No intake/output data recorded.          11/25/23  0316 11/26/23  0527 11/27/23  0635   Weight: 46.9 kg (103 lb 4.8 oz) 46.9 kg (103 lb 5.3 oz) 46.7 kg (103 lb)     Objective:  General Appearance:  Comfortable and in no acute distress.    Vital signs: (most recent): Blood pressure 102/72, pulse 75, temperature 98.1 °F (36.7 °C), resp. rate 18, height 165 cm (64.96\"), weight 46.7 kg (103 lb), last menstrual period 08/10/2022, SpO2 100%, not currently breastfeeding.  Vital signs are normal.  No fever.    Output: No urine output and producing stool.    HEENT: (NG tube in place)    Lungs:  Normal effort and normal respiratory rate.  There are decreased breath sounds.    Heart: Normal rate.  Regular rhythm.  (SR on tele monitor)  Abdomen: Abdomen is soft and non-distended.  Bowel sounds are normal.     Pulses: Distal pulses are intact.    Neurological: Patient is oriented to person, place and time.  (drowsy).    Skin:  " "Warm and dry.  (Sternal dressing clean, dry, and intact)          Results Review:        WBC WBC   Date Value Ref Range Status   11/27/2023 7.31 3.40 - 10.80 10*3/mm3 Final   11/26/2023 7.92 3.40 - 10.80 10*3/mm3 Final   11/25/2023 8.69 3.40 - 10.80 10*3/mm3 Final      HGB Hemoglobin   Date Value Ref Range Status   11/27/2023 9.6 (L) 12.0 - 15.9 g/dL Final   11/26/2023 10.2 (L) 12.0 - 15.9 g/dL Final   11/25/2023 9.4 (L) 12.0 - 15.9 g/dL Final      HCT Hematocrit   Date Value Ref Range Status   11/27/2023 30.2 (L) 34.0 - 46.6 % Final   11/26/2023 31.5 (L) 34.0 - 46.6 % Final   11/25/2023 28.1 (L) 34.0 - 46.6 % Final      Platelets Platelets   Date Value Ref Range Status   11/27/2023 203 140 - 450 10*3/mm3 Final   11/26/2023 212 140 - 450 10*3/mm3 Final   11/25/2023 182 140 - 450 10*3/mm3 Final        PT/INR:  No results found for: \"PROTIME\"/No results found for: \"INR\"    Sodium Sodium   Date Value Ref Range Status   11/27/2023 123 (L) 136 - 145 mmol/L Final   11/26/2023 127 (L) 136 - 145 mmol/L Final   11/25/2023 126 (L) 136 - 145 mmol/L Final      Potassium Potassium   Date Value Ref Range Status   11/27/2023 4.4 3.5 - 5.2 mmol/L Final   11/26/2023 3.2 (L) 3.5 - 5.2 mmol/L Final   11/25/2023 3.5 3.5 - 5.2 mmol/L Final      Chloride Chloride   Date Value Ref Range Status   11/27/2023 84 (L) 98 - 107 mmol/L Final   11/26/2023 85 (L) 98 - 107 mmol/L Final   11/25/2023 86 (L) 98 - 107 mmol/L Final      Bicarbonate CO2   Date Value Ref Range Status   11/27/2023 25.3 22.0 - 29.0 mmol/L Final   11/26/2023 29.0 22.0 - 29.0 mmol/L Final   11/25/2023 28.5 22.0 - 29.0 mmol/L Final      BUN BUN   Date Value Ref Range Status   11/27/2023 54 (H) 6 - 20 mg/dL Final   11/26/2023 56 (H) 6 - 20 mg/dL Final   11/25/2023 48 (H) 6 - 20 mg/dL Final      Creatinine Creatinine   Date Value Ref Range Status   11/27/2023 5.15 (H) 0.57 - 1.00 mg/dL Final   11/26/2023 4.58 (H) 0.57 - 1.00 mg/dL Final   11/25/2023 4.78 (H) 0.57 - 1.00 mg/dL " "Final      Calcium Calcium   Date Value Ref Range Status   11/27/2023 9.1 8.6 - 10.5 mg/dL Final   11/26/2023 9.2 8.6 - 10.5 mg/dL Final   11/25/2023 8.7 8.6 - 10.5 mg/dL Final      Magnesium No results found for: \"MG\"         aspirin, 81 mg, Nasogastric, Daily  carvedilol, 25 mg, Nasogastric, Q12H  chlorhexidine, 15 mL, Mouth/Throat, Q12H  Delflex-LC/4.25% Dextrose (DIANEAL) 2,000 mL with heparin (porcine) 5000 UNIT/ML 1,500 Units dialysis solution, , Intraperitoneal, Q5H  epoetin cari/cari-epbx, 5,000 Units, Subcutaneous, Once per day on Mon Wed Fri  escitalopram, 10 mg, Nasogastric, Daily  First Mouthwash (Magic Mouthwash), 10 mL, Swish & Spit, Q6H  hydroxychloroquine, 200 mg, Oral, Daily  insulin regular, 2-7 Units, Subcutaneous, Q6H  Lacosamide, 100 mg, Intravenous, Q12H  lactulose, 10 g, Nasogastric, TID  lidocaine, 10 mL, Subcutaneous, Once  metoclopramide, 5 mg, Intravenous, Q6H  midodrine, 2.5 mg, Oral, TID AC  mupirocin, , Each Nare, BID  mycophenolate, 250 mg, Oral, Q12H  pantoprazole, 40 mg, Intravenous, Q AM      hold, 1 each      Assessment & Plan  - Ascending aortic aneurysm with dissection- s/p ascending aortic dissection repair/proximal replacement, gacron interposition graft, kelli procedure; insertion of right femoral shiley---POD#24; Pagni  - Cardiac tamponade- reexploration for bleeding, removal of large clot compressing the RV---POD#19; Camporrotondo  - severe aortic valve insufficiency  - Encephalopathy, improving   - ESRD on PD  - Lupus--on Cellcept/plaquenil; currently held  - Epilepsy  - chronic immunosuppression  - hypertension  - chronic anemia  - TCP--chronic; improving  - Depression--started on lexapro 11/14  - right MCA CVA--continue ASA per neuro  - hyponatremia--hypervolemic, nephrology following  - malnutrition r/t decreased caloric intake--nutrition following  - hypokalemia--resolved, potassium 4.4    Laying in bed, opened eyes and answered questions with head movements. Denies " chest or abdominal pain, SOB, palpitations. Patient is now refusing oral medications.  Continues to refuse most PO intake--will now eat candy and drink juice. Blood sugars have been elevated because of this--continue with sliding scale insulin as needed. Has NG tube in place at present; continues to refuse PEG tube placement.  There were issues with draining her dialysate last night; creatinine now 5.15, sodium now 123. Nephrology is following the patient and have recommended switching her to hemodialysis; patient has gone back and forth on whether she will allow this and is currently refusing.  Paint sternal incision with betadine daily.  Continue aggressive PT/OT.  D/c plan TBD--SNF vs. Rehab.  Continue supportive care.      JACKIE Porter  11/27/23  09:18 EST

## 2023-11-27 NOTE — PLAN OF CARE
Goal Outcome Evaluation:  Plan of Care Reviewed With: patient, grandparent, mother        Progress: improving  Outcome Evaluation: Pt participated in OT today, her family is present and they are very encouraging today. Pt is very soft spoken and flat affect. She follows 1 step commands but her pace with any movement is signficantly slow. Pt was able to complete UE exercises against gravity and appears very generally weak. She was able to work with OT today on increasing her mobility to the bathroom and back to the chair, however she denies needs for ADLs this visit but does want to sit up to eat lunch.      Anticipated Discharge Disposition (OT): skilled nursing facility

## 2023-11-27 NOTE — PROGRESS NOTES
Name: Dee Herrera ADMIT: 10/26/2023   : 1990  PCP: Margarita Woods APRN    MRN: [id number] LOS: 32 days   AGE/SEX: 33 y.o. female  ROOM: [facility]     Subjective   Subjective   Patient is seen at bedside, no new complaints.       Objective   Objective   Vital Signs  Temp:  [97.9 °F (36.6 °C)-99.5 °F (37.5 °C)] 99.5 °F (37.5 °C)  Heart Rate:  [78-85] 85  Resp:  [18] 18  BP: ()/(61-92) 118/84  SpO2:  [100 %] 100 %  on   ;   Device (Oxygen Therapy): room air  Body mass index is 17.16 kg/m².  Physical Exam  General, awake and alert.  Ill-appearing female  Head and ENT, normocephalic and atraumatic.  Lungs, symmetric expansion, equal air entry bilaterally.  Heart, regular rate and rhythm.  Abdomen, soft and nontender.  Extremities, no clubbing or cyanosis.  Neuro, no focal deficits.  Skin: Warm and no rash.  Psych, normal mood and affect.  Musculoskeletal, joint examination is grossly normal.    Copied text material from yesterday's note has been reviewed for appropriate changes and remains accurate as of 23.      Results Review     I reviewed the patient's new clinical results.  Results from last 7 days   Lab Units 23  0704 23  0612 23  0552 23  0556   WBC 10*3/mm3 7.31 7.92 8.69 10.59   HEMOGLOBIN g/dL 9.6* 10.2* 9.4* 9.6*   PLATELETS 10*3/mm3 203 212 182 200     Results from last 7 days   Lab Units 23  0704 23  0612 23  0552 23  0556   SODIUM mmol/L 123* 127* 126* 127*   POTASSIUM mmol/L 4.4 3.2* 3.5 3.6   CHLORIDE mmol/L 84* 85* 86* 87*   CO2 mmol/L 25.3 29.0 28.5 29.8*   BUN mg/dL 54* 56* 48* 49*   CREATININE mg/dL 5.15* 4.58* 4.78* 4.56*   GLUCOSE mg/dL 79 112* 106* 107*   EGFR mL/min/1.73 10.8* 12.5* 11.8* 12.5*     Results from last 7 days   Lab Units 23  0704 23  0612 23  0552 23  0556   ALBUMIN g/dL 1.9* 2.5* 2.0* 2.1*     Results from last 7 days   Lab Units 23  0704 23  0612 23  0552  11/24/23  0556 11/23/23  0428 11/22/23  0550 11/21/23  1718   CALCIUM mg/dL 9.1 9.2 8.7 8.8   < > 8.8  --    ALBUMIN g/dL 1.9* 2.5* 2.0* 2.1*   < > 2.4*  --    MAGNESIUM mg/dL  --   --   --  1.7  --  2.2 1.5*   PHOSPHORUS mg/dL 4.1 2.1* 2.7 2.6   < > 3.7  --     < > = values in this interval not displayed.       Glucose   Date/Time Value Ref Range Status   11/27/2023 1212 184 (H) 70 - 130 mg/dL Final   11/27/2023 0630 82 70 - 130 mg/dL Final   11/27/2023 0012 111 70 - 130 mg/dL Final   11/26/2023 2132 88 70 - 130 mg/dL Final   11/26/2023 1639 482 (C) 70 - 130 mg/dL Final   11/26/2023 1213 118 70 - 130 mg/dL Final   11/26/2023 0634 136 (H) 70 - 130 mg/dL Final       No radiology results for the last day    I have personally reviewed all medications:  Scheduled Medications  aspirin, 81 mg, Nasogastric, Daily  carvedilol, 25 mg, Nasogastric, Q12H  chlorhexidine, 15 mL, Mouth/Throat, Q12H  Delflex-LC/4.25% Dextrose (DIANEAL) 2,000 mL with heparin (porcine) 5000 UNIT/ML 1,500 Units dialysis solution, , Intraperitoneal, Q5H  epoetin cari/cari-epbx, 5,000 Units, Subcutaneous, Once per day on Mon Wed Fri  escitalopram, 10 mg, Nasogastric, Daily  First Mouthwash (Magic Mouthwash), 10 mL, Swish & Spit, Q6H  hydroxychloroquine, 200 mg, Oral, Daily  insulin regular, 2-7 Units, Subcutaneous, Q6H  Lacosamide, 100 mg, Intravenous, Q12H  lactulose, 10 g, Nasogastric, TID  lidocaine, 10 mL, Subcutaneous, Once  metoclopramide, 5 mg, Intravenous, Q6H  midodrine, 2.5 mg, Oral, TID AC  mupirocin, , Each Nare, BID  mycophenolate, 250 mg, Oral, Q12H  pantoprazole, 40 mg, Intravenous, Q AM    Infusions  hold, 1 each    Diet  Diet: Regular/House Diet, Fluid Restriction (240 mL/tray) Diets; 1000 mL/day; Feeding Assistance - Nursing; Texture: Soft to Chew (NDD 3); Soft to Chew: Chopped Meat; Fluid Consistency: Thin (IDDSI 0)    I have personally reviewed:  [x]  Laboratory   [x]  Microbiology   [x]  Radiology   [x]  EKG/Telemetry  [x]   Cardiology/Vascular   []  Pathology    []  Records       Assessment/Plan     Active Hospital Problems    Diagnosis  POA    **Dissecting ascending aortic aneurysm [I71.010]  Yes    Recent cerebrovascular accident (CVA) [Z86.73]  Yes    Aortic valve lesion [I35.8]  Yes    Severe aortic valve regurgitation [I35.1]  Yes    Hyponatremia [E87.1]  Yes    Poor appetite [R63.0]  Yes    Moderate malnutrition [E44.0]  Yes    Chronic diastolic CHF (congestive heart failure) [I50.32]  Yes    Anemia due to chronic kidney disease, on chronic dialysis [N18.6, D63.1, Z99.2]  Not Applicable    Peritoneal dialysis catheter in place [Z99.2]  Not Applicable    Essential hypertension [I10]  Yes    End stage renal disease on dialysis [N18.6, Z99.2]  Not Applicable    Seizure disorder [G40.909]  Yes    Elevated liver function tests [R79.89]  Yes    Pericardial effusion [I31.39]  Yes    Systemic lupus erythematosus [M32.9]  Yes    Thrombocytopenia [D69.6]  Yes    Hypertension secondary to other renal disorders [I15.1]  Yes    Pancytopenia [D61.818]  Yes    Vitamin D deficiency [E55.9]  Yes      Resolved Hospital Problems    Diagnosis Date Resolved POA    Abdominal pain [R10.9] 10/28/2023 Yes       33 y.o. female admitted with Dissecting ascending aortic aneurysm.    Assessment and plan  Oral intake and nutrition status remained poor.  Continue continuous tube feeds.  Vomiting has resolved. Continue lactulose.  Continue Reglan.  She continues to refuse PEG tube placement.  -Neurology has provided recommendations regarding her acute CVA.  Currently on low-dose aspirin.  On Vimpat for seizures.  -Keppra discontinued given possible effects on her mood.  Remains on Lexapro.  -She remains very withdrawn but denies depression.  Psychiatry did not have any specific recommendations.  -Patient now starting to refuse peritoneal dialysis.  I discussed this with her today and suggested she reconsider transition to hemodialysis as per our plans last  week.  She now states she is agreeable to this.  I informed her that moving forward she needs to make decisions and stick with them rather than vacillating like she did last week.  Will await evaluation from nephrology.  -CellCept and Plaquenil have been restarted  -Blood sugars well controlled.  Continue low-dose sliding scale insulin  -Continue therapy services     Dispo  Will need rehab     Her prognosis is guarded and looking quite poor at this point in time given her ongoing refusal to pursue PEG tube.  Also now starting to refuse PD.       Torres Gay MD  [facility]  11/27/23  15:48 EST

## 2023-11-27 NOTE — PROGRESS NOTES
"Nutrition Services    Patient Name:  Dee Herrera  YOB: 1990  MRN: [id number]  Admit Date:  10/26/2023    Assessment Date:  11/27/23    Summary: Cortrak placement/follow up  Clogged cortrak tube.  This was removed by me today at bedside and new cortrak placement was attempted.  After first attempt, patient refused to let this RD repeat attempt.    Patient has been going back and forth about possible PEG placement.  Has been refusing transitioning from PD to HD.  I discussed this with her at bedside - she reported she would be interested in PEG placement.  I explained that this means she would have to transition to HD instead of PD and she also agreed to this.  Seems somewhat confused.  Unsure if she will stick with this decision.  Spoke with RN, her and I both tried to get patient to let me attempt cortrak placement again and she continued to refuse.    Oral intake continues to be sub-optimal. RN reports today patient ate wings at lunch and then door dashed McCallisters (sandwich and soup).  She only took bites of McCallisters, but she seems to be trying to eat more.    Plan/recommend:  Continue to monitor/encourage PO intake.   Follow closely for plans for nutrition.    RD following. Plan d/w SUL[facility] on 6S.    CLINICAL NUTRITION ASSESSMENT      Reason for Assessment Cortrak Placement, Follow-up Protocol     Diagnosis/Problem SOA, hyponatremia, ESRD on dialysis, abd pain, poor oral intake     Anthropometrics        Current Height  Current Weight  BMI kg/m2 Height: 165 cm (64.96\")  Weight: 46.7 kg (103 lb) (11/27/23 0635)  Body mass index is 17.16 kg/m².   Adjusted BMI (if applicable)    BMI Category Normal/Healthy (18.4 - 24.9)   Ideal Body Weight (IBW) 125lb   Usual Body Weight (UBW) 120-155   Weight Trend Loss, 5lbs recently, 35lb overall      11/25/23  0316 11/26/23 0527 11/27/23 0635   Weight: 46.9 kg (103 lb 4.8 oz) 46.9 kg (103 lb 5.3 oz) 46.7 kg (103 lb)       --  Estimated/Assessed " Needs        Current Weight  Weight: 55 kg (121 lb 4.1 oz) (11/06/23 1340)       Energy Requirements    Weight for Calculation 52.2 kg   Method for Estimation  30-35 kcal/kg   EST Needs (kcal/day) 5835-2373       Protein Requirements    Weight for Calculation 52.2 kg   EST Protein Needs (g/kg) 1.0 gm/kg, 1.5 gm/kg   EST Daily Needs (g/day) 52-78       Fluid Requirements     Method for Estimation 1 mL/kcal    EST Needs (mL/day)      Labs       Pertinent Labs    Results from last 7 days   Lab Units 11/27/23  0704 11/26/23 0612 11/25/23  0552   SODIUM mmol/L 123* 127* 126*   POTASSIUM mmol/L 4.4 3.2* 3.5   CHLORIDE mmol/L 84* 85* 86*   CO2 mmol/L 25.3 29.0 28.5   BUN mg/dL 54* 56* 48*   CREATININE mg/dL 5.15* 4.58* 4.78*   CALCIUM mg/dL 9.1 9.2 8.7   GLUCOSE mg/dL 79 112* 106*     Results from last 7 days   Lab Units 11/27/23  0704 11/25/23  0552 11/24/23  0556 11/23/23  0428 11/22/23  0550 11/21/23  1718   MAGNESIUM mg/dL  --   --  1.7  --  2.2 1.5*   PHOSPHORUS mg/dL 4.1   < > 2.6   < > 3.7  --    HEMOGLOBIN g/dL 9.6*   < > 9.6*   < > 10.1*  --    HEMATOCRIT % 30.2*   < > 29.2*   < > 30.1*  --    WBC 10*3/mm3 7.31   < > 10.59   < > 12.10*  --    ALBUMIN g/dL 1.9*   < > 2.1*   < > 2.4*  --     < > = values in this interval not displayed.     Results from last 7 days   Lab Units 11/27/23  0704 11/26/23  0612 11/25/23  0552 11/24/23  0556 11/23/23  0428   PLATELETS 10*3/mm3 203 212 182 200 199     COVID19   Date Value Ref Range Status   10/31/2023 Not Detected Not Detected - Ref. Range Final     Lab Results   Component Value Date    HGBA1C 5.10 10/30/2023          Medications           Scheduled Medications aspirin, 81 mg, Nasogastric, Daily  carvedilol, 25 mg, Nasogastric, Q12H  chlorhexidine, 15 mL, Mouth/Throat, Q12H  Delflex-LC/4.25% Dextrose (DIANEAL) 2,000 mL with heparin (porcine) 5000 UNIT/ML 1,500 Units dialysis solution, , Intraperitoneal, Q5H  epoetin cari/cari-epbx, 5,000 Units, Subcutaneous, Once per day  on   escitalopram, 10 mg, Nasogastric, Daily  First Mouthwash (Magic Mouthwash), 10 mL, Swish & Spit, Q6H  hydroxychloroquine, 200 mg, Oral, Daily  insulin regular, 2-7 Units, Subcutaneous, Q6H  Lacosamide, 100 mg, Intravenous, Q12H  lactulose, 10 g, Nasogastric, TID  lidocaine, 10 mL, Subcutaneous, Once  metoclopramide, 5 mg, Intravenous, Q6H  midodrine, 2.5 mg, Oral, TID AC  mupirocin, , Each Nare, BID  mycophenolate, 250 mg, Oral, Q12H  pantoprazole, 40 mg, Intravenous, Q AM       Infusions hold, 1 each       PRN Medications   acetaminophen **OR** acetaminophen **OR** acetaminophen    bisacodyl    bisacodyl    dextrose    dextrose    glucagon (human recombinant)    heparin (porcine)    hold    ipratropium-albuterol    [] Morphine **AND** naloxone    nitroglycerin    ondansetron    polyethylene glycol     Physical Findings          General Findings alert, disoriented, generalized wasting, loss of muscle mass, loss of subcutaneous fat, room air, underweight   Oral/Mouth Cavity dry mouth, tooth or teeth missing   Edema  generalized, 1+ (trace)   Gastrointestinal fecal incontinence, non-distended , normoactive, last bowel movement:    Skin  surgical incision: sternal, abdomen   Tubes/Drains/Lines dialysis catheter   NFPE See Malnutrition Severity Assessment -10/26/23   --  Malnutrition Severity Assessment      Patient meets criteria for : Moderate (non-severe) Malnutrition       Current Nutrition Orders & Evaluation of Intake       Oral Nutrition     Food Allergies NKFA   Current PO Diet Diet: Regular/House Diet, Fluid Restriction (240 mL/tray) Diets; 1000 mL/day; Feeding Assistance - Nursing; Texture: Soft to Chew (NDD 3); Soft to Chew: Chopped Meat; Fluid Consistency: Thin (IDDSI 0)   Supplement Boost Plus, Boost Breeze, BID, TID   PO Evaluation     % PO Intake 0-50%    Factors Affecting Intake: altered respiratory status, decreased appetite   --  PES STATEMENT / NUTRITION DIAGNOSIS       Nutrition Dx Problem  Problem: Unintentional Weight Loss, Malnutrition (moderate), and Inadequate Oral Intake  Etiology: Factors Affecting Nutrition - decrease appetite, abd pain    Signs/Symptoms: PO intake, NFPE Results, Unintended Weight Change, and Report/Observation     NUTRITION INTERVENTION / PLAN OF CARE      Intervention Goal(s) Maintain nutrition status, Reduce/improve symptoms, Meet estimated needs, Disease management/therapy, Tolerate PO , Increase intake, and Maintain weight         RD Intervention/Action Supplement provided, Encourage intake, Continue to monitor, and Care plan reviewed   --      Prescription/Orders:       PO Diet       Supplements Boost Plus BID + Boost Breeze TID (ordered by MD)      Enteral Nutrition    Enteral Prescription:     Enteral Route NG    TF Delivery Method Continuous    Enteral Product Novasource Renal    Modular None    Propofol Rate/Kcal     TF Start Rate  35 mL/hr     TF Goal Rate  35 mL/hr     Free Water Flush 30 mL Q 4 hr    Provision at Goal:          Calories 1680 kcal, meets 100 % needs         Protein  76 gm protein, meets 100 % needs         Fluid (mL) 604 mL free water + 180 mL in flushes    Prescription Ordered No, recommended         Parenteral Nutrition    New Prescription Ordered? No, recommended         Monitor/Evaluation Per protocol, PO intake, Pertinent labs, Weight, Skin status, GI status, Symptoms, Swallow function   Discharge Plan/Needs Pending clinical course       RD to follow per protocol.      Electronically signed by:  Shell Horn RD  11/27/23 16:40 EST

## 2023-11-27 NOTE — THERAPY TREATMENT NOTE
Patient Name: Dee Herrera  : 1990    MRN: [id number]                              Today's Date: 2023       Admit Date: 10/26/2023    Visit Dx:     ICD-10-CM ICD-9-CM   1. Shortness of breath  R06.02 786.05   2. ESRD (end stage renal disease) on dialysis  N18.6 585.6    Z99.2 V45.11   3. Hyponatremia  E87.1 276.1   4. Generalized abdominal pain  R10.84 789.07   5. Elevated lactic acid level  R79.89 276.2   6. Elevated troponin  R79.89 790.6   7. Severe aortic valve regurgitation  I35.1 424.1   8. Pericardial effusion  I31.39 423.9   9. 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 syndrome    Systemic lupus erythematosus    Lupus nephritis, ISN/RPS class IV    Hypokalemia    Hypocalcemia    COVID-19    Hospital discharge follow-up    Stage 5 chronic kidney disease    Cardiac cirrhosis    Pancreatitis    Duodenitis    Regional enteritis of small bowel    Pericardial effusion    End stage renal disease on dialysis    Hemodialysis status    Seizure disorder    Elevated liver function tests    C. difficile colitis    Anemia, chronic disease    Essential hypertension    Peritoneal dialysis catheter in place    Anemia due to chronic kidney disease, on chronic dialysis    Alternating constipation and diarrhea    Abnormal stress test    Hyponatremia    Poor appetite    Moderate malnutrition    Chronic diastolic CHF (congestive heart failure)    Aortic valve lesion    Severe aortic valve regurgitation    Dissecting ascending aortic aneurysm    Recent cerebrovascular accident (CVA)     Past Medical History:   Diagnosis Date    Anasarca     PER CT SCAN    Dry skin      ESRD (end stage renal disease) on dialysis     MARCO COLE, KATIE CHAVIRA HWDRAKE    History of abdominal pain     History of anemia     History of transfusion     Hypertension     Iron deficiency anemia 09/27/2021    Lupus (systemic lupus erythematosus) 07/30/2022    Migraine     Other specified nutritional anemias     Pericardial effusion     Renal insufficiency     Seizures     STATES LAST WAS 1/2023    Shortness of breath     OCCASIONAL    Vitamin D deficiency 09/27/2021     Past Surgical History:   Procedure Laterality Date    ASCENDING AORTIC ANEURYSM REPAIR W/ MECHANICAL AORTIC VALVE REPLACEMENT N/A 11/2/2023    Procedure: CHETNA STERNOTOMY, AORTIC ROOT REPLACEMENT WITH VALVE SPARING MISHEL PROCEDURE, REPLACEMENT OF ASCENDING AORTA, RIGHT FEMORAL DIALYSIS CATHETER PLACEMENT AND PRP;  Surgeon: Chris Medina MD;  Location: [facility] CVOR;  Service: Cardiothoracic;  Laterality: N/A;    CARDIAC CATHETERIZATION N/A 06/14/2023    Procedure: Coronary angiography;  Surgeon: Juana Taylor MD;  Location: [facility] CATH INVASIVE LOCATION;  Service: Cardiovascular;  Laterality: N/A;    CARDIAC CATHETERIZATION N/A 06/14/2023    Procedure: Left heart cath;  Surgeon: Juana Taylor MD;  Location: [facility] CATH INVASIVE LOCATION;  Service: Cardiovascular;  Laterality: N/A;    CARDIAC CATHETERIZATION N/A 06/14/2023    Procedure: Right Heart Cath;  Surgeon: Juana Taylor MD;  Location: [facility] CATH INVASIVE LOCATION;  Service: Cardiovascular;  Laterality: N/A;    COLONOSCOPY N/A 7/20/2023    Procedure: COLONOSCOPY to cecum with biopsy;  Surgeon: Drew Kaminski MD;  Location: [facility] ENDOSCOPY;  Service: Gastroenterology;  Laterality: N/A;  PRE - diarrhea, constipation  POST - fair prep, normal    CORONARY ARTERY BYPASS GRAFT N/A 11/6/2023    Procedure: STERNAL EXPLORATION AND WASH OUT;  Surgeon: Jr Mitesh Quiroz MD;  Location: [facility] CVOR;  Service: Cardiothoracic;  Laterality: N/A;    ENDOSCOPY N/A 7/20/2023     Procedure: ESOPHAGOGASTRODUODENOSCOPY with biopsy;  Surgeon: Drew Kaminski MD;  Location: [facility] ENDOSCOPY;  Service: Gastroenterology;  Laterality: N/A;  PRE - abn ct abd  POST - gastritis    INSERTION HEMODIALYSIS CATHETER N/A 07/26/2022    Procedure: RIGHT TUNNELED DIALYSIS CATHETER PLACEMENT;  Surgeon: Diandra Adhikari MD;  Location: [facility] MAIN OR;  Service: Vascular;  Laterality: N/A;    INSERTION PERITONEAL DIALYSIS CATHETER N/A 04/03/2023    Procedure: INSERTION PERITONEAL DIALYSIS CATHETER LAPAROSCOPIC, omentumpexy;  Surgeon: Jemal Loyola MD;  Location: [facility] MAIN OR;  Service: General;  Laterality: N/A;    TONSILLECTOMY        General Information       Row Name 11/27/23 [facility]9          OT Time and Intention    Document Type therapy note (daily note)  -     Mode of Treatment occupational therapy;individual therapy  -       Row Name 11/27/23 1811          General Information    Patient Profile Reviewed yes  -     Existing Precautions/Restrictions fall;sternal;cardiac  -       Row Name 11/27/23 5456          Cognition    Orientation Status (Cognition) oriented x 3  -       Row Name 11/27/23 [facility]0          Safety Issues, Functional Mobility    Safety Issues Affecting Function (Mobility) --  flat affect, slow to respond  -     Impairments Affecting Function (Mobility) balance;endurance/activity tolerance;strength;motor control;coordination  -               User Key  (r) = Recorded By, (t) = Taken By, (c) = Cosigned By      Initials Name Provider Type     Etta Bustillos OT Occupational Therapist                     Mobility/ADL's       Row Name 11/27/23 5225          Bed Mobility    Supine-Sit [place] (Bed Mobility) minimum assist (75% patient effort);verbal cues  -     Assistive Device (Bed Mobility) head of bed elevated;bed rails  -       Row Name 11/27/23 4939          Sit-Stand Transfer    Sit-Stand [place] (Transfers) minimum assist (75% patient  effort);verbal cues  -     Assistive Device (Sit-Stand Transfers) walker, front-wheeled  -       Row Name 11/27/23 1359          Functional Mobility    Functional Mobility- Ind. Level contact guard assist;minimum assist (75% patient effort)  -     Functional Mobility- Device walker, front-wheeled  -     Functional Mobility-Distance (Feet) --  30  -       Row Name 11/27/23 1359          Self-Feeding Assessment/Training    [place] Level (Feeding) feeding skills;set up  -     Position (Self-Feeding) supported sitting  -               User Key  (r) = Recorded By, (t) = Taken By, (c) = Cosigned By      Initials Name Provider Type    Etta Maya OT Occupational Therapist                   Obj/Interventions       [facility] Name 11/27/23 1400          Shoulder (Therapeutic Exercise)    Shoulder (Therapeutic Exercise) AROM (active range of motion)  -     Shoulder AROM (Therapeutic Exercise) bilateral;flexion;extension;aBduction;aDduction;5 repetitions  -[facility] Name 11/27/23 1400          Elbow/Forearm (Therapeutic Exercise)    Elbow/Forearm (Therapeutic Exercise) AROM (active range of motion)  -     Elbow/Forearm AROM (Therapeutic Exercise) bilateral;flexion;extension;10 repetitions  -       Row Name 11/27/23 1400          Motor Skills    Therapeutic Exercise shoulder  -[facility] Name 11/27/23 1400          Balance    Static Sitting Balance standby assist  -     Position, Sitting Balance sitting edge of bed  -     Static Standing Balance contact guard  -     Dynamic Standing Balance minimal assist  -     Position/Device Used, Standing Balance supported;walker, rolling  -               User Key  (r) = Recorded By, (t) = Taken By, (c) = Cosigned By      Initials Name Provider Type    Etta Maya OT Occupational Therapist                   Goals/Plan    No documentation.                  Clinical Impression       Row Name 11/27/23 1401          Pain Assessment     Pre/Posttreatment Pain Comment no c/o of pain  -       Row Name 11/27/23 1401          Plan of Care Review    Plan of Care Reviewed With patient;grandparent;mother  -SM     Progress improving  -     Outcome Evaluation Pt participated in OT today, her family is present and they are very encouraging today. Pt is very soft spoken and flat affect. She follows 1 step commands but her pace with any movement is signficantly slow. Pt was able to complete UE exercises against gravity and appears very generally weak. She was able to work with OT today on increasing her mobility to the bathroom and back to the chair, however she denies needs for ADLs this visit but does want to sit up to eat lunch.  -       Row Name 11/27/23 1401          Therapy Plan Review/Discharge Plan (OT)    Anticipated Discharge Disposition (OT) skilled nursing facility  -       Row Name 11/27/23 1401          Positioning and Restraints    Pre-Treatment Position in bed  -SM     Post Treatment Position chair  -SM     In Chair reclined;call light within reach;encouraged to call for assist;exit alarm on;notified nsg;with nsg  -SM               User Key  (r) = Recorded By, (t) = Taken By, (c) = Cosigned By      Initials Name Provider Type    Etta Maya, MADELIN Occupational Therapist                   Outcome Measures       Row Name 11/27/23 1407          How much help from another is currently needed...    Putting on and taking off regular lower body clothing? 1  -SM     Bathing (including washing, rinsing, and drying) 2  -SM     Toileting (which includes using toilet bed pan or urinal) 2  -SM     Putting on and taking off regular upper body clothing 2  -SM     Taking care of personal grooming (such as brushing teeth) 2  -SM     Eating meals 3  -SM     AM-PAC 6 Clicks Score (OT) 12  -SM       Row Name 11/27/23 7835          How much help from another person do you currently need...    Turning from your back to your side while in flat bed  without using bedrails? 4  -KE     Moving from lying on back to sitting on the side of a flat bed without bedrails? 3  -KE     Moving to and from a bed to a chair (including a wheelchair)? 3  -KE     Standing up from a chair using your arms (e.g., wheelchair, bedside chair)? 3  -KE     Climbing 3-5 steps with a railing? 2  -KE     To walk in hospital room? 3  -KE     AM-PAC 6 Clicks Score (PT) 18  -KE     Highest Level of Mobility Goal 6 --> Walk 10 steps or more  -KE       Row Name 11/27/23 1404          Functional Assessment    Outcome Measure Options AM-PAC 6 Clicks Daily Activity (OT)  -               User Key  (r) = Recorded By, (t) = Taken By, (c) = Cosigned By      Initials Name Provider Type    Etta Maya OT Occupational Therapist    Ligia Wiley, RN Registered Nurse                    Occupational Therapy Education       Title: PT OT SLP Therapies (In Progress)       Topic: Occupational Therapy (Not Started)       Point: ADL training (Not Started)       Description:   Instruct learner(s) on proper safety adaptation and remediation techniques during self care or transfers.   Instruct in proper use of assistive devices.                  Learner Progress:  Not documented in this visit.              Point: Home exercise program (Not Started)       Description:   Instruct learner(s) on appropriate technique for monitoring, assisting and/or progressing therapeutic exercises/activities.                  Learner Progress:  Not documented in this visit.              Point: Precautions (Not Started)       Description:   Instruct learner(s) on prescribed precautions during self-care and functional transfers.                  Learner Progress:  Not documented in this visit.              Point: Body mechanics (Not Started)       Description:   Instruct learner(s) on proper positioning and spine alignment during self-care, functional mobility activities and/or exercises.                  Learner  Progress:  Not documented in this visit.                                  OT Recommendation and Plan     Plan of Care Review  Plan of Care Reviewed With: patient, grandparent, mother  Progress: improving  Outcome Evaluation: Pt participated in OT today, her family is present and they are very encouraging today. Pt is very soft spoken and flat affect. She follows 1 step commands but her pace with any movement is signficantly slow. Pt was able to complete UE exercises against gravity and appears very generally weak. She was able to work with OT today on increasing her mobility to the bathroom and back to the chair, however she denies needs for ADLs this visit but does want to sit up to eat lunch.     Time Calculation:         Time Calculation- OT       Row Name 11/27/23 1404             Time Calculation- OT    OT Start Time 1142  -SM      OT Stop Time 1205  -SM      OT Time Calculation (min) 23 min  -SM      Total Timed Code Minutes- OT 23 minute(s)  -SM      OT Received On 11/27/23  -SM      OT - Next Appointment 11/28/23  -SM         Timed Charges    05367 - OT Therapeutic Exercise Minutes 13  -SM      76211 - OT Therapeutic Activity Minutes 10  -SM         Total Minutes    Timed Charges Total Minutes 23  -SM       Total Minutes 23  -SM                User Key  (r) = Recorded By, (t) = Taken By, (c) = Cosigned By      Initials Name Provider Type     Etta Bustillos OT Occupational Therapist                  Therapy Charges for Today       Code Description Service Date Service Provider Modifiers Qty    [id number]  OT THER PROC EA 15 MIN 11/27/2023 Etta Bustillos OT GO 1    [id number]  OT THERAPEUTIC ACT EA 15 MIN 11/27/2023 Etta Bustillos OT GO 1                 Etta Bustillos OT  11/27/2023

## 2023-11-27 NOTE — PLAN OF CARE
Goal Outcome Evaluation:              Outcome Evaluation: Pt was agreeable to therapy and was able to walk to hallway outside of her room today with Rwx. gait is slow with small steps and narrow JOSLYN. Pt fatigued quickly and asked to return to bed. Will continue to progress as tolerated.

## 2023-11-27 NOTE — PROGRESS NOTES
[facility] Progress Note      Patient Name: Dee Herrera  : 1990  MRN: [id number]  Primary Care Physician:  Margarita Woods APRN  Date of admission: 10/26/2023    Subjective     Interval History:   Follow-up end-stage renal disease currently on peritoneal dialysis    The patient is much more awake and alert, responsive answering question.  As she had difficulty draining her peritoneal dialysate last night.  Denies any chest pain or shortness of breath, she is currently having core track in place tolerating her tube feed, she has been refusing to take her oral medication    Review of Systems:   As noted above    Objective     Vitals:   Temp:  [97.9 °F (36.6 °C)-98.6 °F (37 °C)] 98.1 °F (36.7 °C)  Heart Rate:  [75-82] 75  Resp:  [18] 18  BP: ()/(61-92) 102/72    Intake/Output Summary (Last 24 hours) at 2023 0827  Last data filed at 2023 1718  Gross per 24 hour   Intake 6860 ml   Output 6750 ml   Net 110 ml       Physical Exam:    General Appearance: alert, awake, chronically ill, no acute distress   Skin: warm and dry  HEENT: Core track is in place  Neck: supple, no JVD  Lungs: Bilateral rhonchi, breathing effort not labored  Heart: RRR, normal S1 and S2, no rub  Abdomen: soft, nontender, nondistended, normoactive bowels, PD catheter in place with clean exit site no tunnel tenderness  Extremities: no edema, cyanosis or clubbing  Neuro: Moving all extremities    Scheduled Meds:     aspirin, 81 mg, Nasogastric, Daily  carvedilol, 25 mg, Nasogastric, Q12H  chlorhexidine, 15 mL, Mouth/Throat, Q12H  Delflex-LC/2.5% Dextrose, 2,000 mL, Intraperitoneal, 6 Exchanges Daily (Q4H)  epoetin cari/cari-epbx, 5,000 Units, Subcutaneous, Once per day on   escitalopram, 10 mg, Nasogastric, Daily  First Mouthwash (Magic Mouthwash), 10 mL, Swish & Spit, Q6H  hydroxychloroquine, 200 mg, Oral, Daily  insulin regular, 2-7 Units, Subcutaneous, Q6H  Lacosamide, 100 mg,  Intravenous, Q12H  lactulose, 10 g, Nasogastric, TID  lidocaine, 10 mL, Subcutaneous, Once  metoclopramide, 5 mg, Intravenous, Q6H  midodrine, 2.5 mg, Oral, TID AC  mupirocin, , Each Nare, BID  mycophenolate, 250 mg, Oral, Q12H  pantoprazole, 40 mg, Intravenous, Q AM      IV Meds:   hold, 1 each        Results Reviewed:   I have personally reviewed the results from the time of this admission to 11/27/2023 08:27 EST     Results from last 7 days   Lab Units 11/27/23  0704 11/26/23  0612 11/25/23  0552   SODIUM mmol/L 123* 127* 126*   POTASSIUM mmol/L 4.4 3.2* 3.5   CHLORIDE mmol/L 84* 85* 86*   CO2 mmol/L 25.3 29.0 28.5   BUN mg/dL 54* 56* 48*   CREATININE mg/dL 5.15* 4.58* 4.78*   CALCIUM mg/dL 9.1 9.2 8.7   GLUCOSE mg/dL 79 112* 106*       Estimated Creatinine Clearance: 11.7 mL/min (A) (by C-G formula based on SCr of 5.15 mg/dL (H)).    Results from last 7 days   Lab Units 11/27/23  0704 11/26/23  0612 11/25/23  0552 11/24/23  0556 11/23/23 0428 11/22/23  0550 11/21/23  1718   MAGNESIUM mg/dL  --   --   --  1.7  --  2.2 1.5*   PHOSPHORUS mg/dL 4.1 2.1* 2.7 2.6   < > 3.7  --     < > = values in this interval not displayed.             Results from last 7 days   Lab Units 11/26/23  0612 11/25/23  0552 11/24/23  0556 11/23/23  0428 11/22/23  0550   WBC 10*3/mm3 7.92 8.69 10.59 12.12* 12.10*   HEMOGLOBIN g/dL 10.2* 9.4* 9.6* 9.5* 10.1*   PLATELETS 10*3/mm3 212 182 200 199 235             Assessment / Plan     ASSESSMENT:  End-stage renal disease on peritoneal dialysis etiology of her ESRD is related to lupus nephritis.  Has evidence of hypervolemia  Hypervolemic hyponatremia  Acute CVA, cortical infarct in the anterior inferior medial right frontal lobe and subacute subdural hematoma right occipital.  Ascending aortic aneurysm with subacute/chronic aortic root dissection, status postrepair of proximal aortic aneurysm 11/2/2023.  Reexploration for cardiac tamponade on 11/6/2023.  Seizure disorder appears to be stable  currently on lacosamide  Anemia of chronic kidney disease hemoglobin stable, 10.2  Hypokalemia, stable and resolved potassium 4.4    PLAN:  I strongly recommend switching to hemodialysis but the patient is not agreeable  To changing dialysate to 4.25% and add heparin and will try 5 exchanges per day  Continue fluid restriction 1000 cc per 24 hours  Surveillance labs    I reviewed the chart and other providers notes, I reviewed labs.  I discussed the case with the patient and with the nursing staff.    Thank you for involving us in the care of Dee Esparzaes.  Please feel free to call with any questions.    Isaac Diaz MD  11/27/23  08:27 [facility]    [facility]  [phone]    Please note that portions of this note were completed with a voice recognition program.

## 2023-11-28 LAB
ALBUMIN SERPL-MCNC: 2.3 G/DL (ref 3.5–5.2)
ANION GAP SERPL CALCULATED.3IONS-SCNC: 13 MMOL/L (ref 5–15)
BASOPHILS # BLD AUTO: 0.02 10*3/MM3 (ref 0–0.2)
BASOPHILS NFR BLD AUTO: 0.3 % (ref 0–1.5)
BUN SERPL-MCNC: 55 MG/DL (ref 6–20)
BUN/CREAT SERPL: 11.1 (ref 7–25)
CALCIUM SPEC-SCNC: 8.9 MG/DL (ref 8.6–10.5)
CHLORIDE SERPL-SCNC: 85 MMOL/L (ref 98–107)
CO2 SERPL-SCNC: 27 MMOL/L (ref 22–29)
CREAT SERPL-MCNC: 4.97 MG/DL (ref 0.57–1)
DEPRECATED RDW RBC AUTO: 41.4 FL (ref 37–54)
EGFRCR SERPLBLD CKD-EPI 2021: 11.3 ML/MIN/1.73
EOSINOPHIL # BLD AUTO: 0.15 10*3/MM3 (ref 0–0.4)
EOSINOPHIL NFR BLD AUTO: 2 % (ref 0.3–6.2)
ERYTHROCYTE [DISTWIDTH] IN BLOOD BY AUTOMATED COUNT: 14 % (ref 12.3–15.4)
GLUCOSE BLDC GLUCOMTR-MCNC: 114 MG/DL (ref 70–130)
GLUCOSE BLDC GLUCOMTR-MCNC: 166 MG/DL (ref 70–130)
GLUCOSE BLDC GLUCOMTR-MCNC: 207 MG/DL (ref 70–130)
GLUCOSE BLDC GLUCOMTR-MCNC: 91 MG/DL (ref 70–130)
GLUCOSE SERPL-MCNC: 104 MG/DL (ref 65–99)
HCT VFR BLD AUTO: 29.9 % (ref 34–46.6)
HGB BLD-MCNC: 9.4 G/DL (ref 12–15.9)
IMM GRANULOCYTES # BLD AUTO: 0.11 10*3/MM3 (ref 0–0.05)
IMM GRANULOCYTES NFR BLD AUTO: 1.5 % (ref 0–0.5)
LYMPHOCYTES # BLD AUTO: 0.56 10*3/MM3 (ref 0.7–3.1)
LYMPHOCYTES NFR BLD AUTO: 7.4 % (ref 19.6–45.3)
MAGNESIUM SERPL-MCNC: 1.4 MG/DL (ref 1.6–2.6)
MAGNESIUM SERPL-MCNC: 2.4 MG/DL (ref 1.6–2.6)
MCH RBC QN AUTO: 26 PG (ref 26.6–33)
MCHC RBC AUTO-ENTMCNC: 31.4 G/DL (ref 31.5–35.7)
MCV RBC AUTO: 82.6 FL (ref 79–97)
MONOCYTES # BLD AUTO: 1.19 10*3/MM3 (ref 0.1–0.9)
MONOCYTES NFR BLD AUTO: 15.8 % (ref 5–12)
NEUTROPHILS NFR BLD AUTO: 5.5 10*3/MM3 (ref 1.7–7)
NEUTROPHILS NFR BLD AUTO: 73 % (ref 42.7–76)
NRBC BLD AUTO-RTO: 0 /100 WBC (ref 0–0.2)
PHOSPHATE SERPL-MCNC: 4.2 MG/DL (ref 2.5–4.5)
PLATELET # BLD AUTO: 201 10*3/MM3 (ref 140–450)
PMV BLD AUTO: 10.2 FL (ref 6–12)
POTASSIUM SERPL-SCNC: 3.7 MMOL/L (ref 3.5–5.2)
RBC # BLD AUTO: 3.62 10*6/MM3 (ref 3.77–5.28)
SODIUM SERPL-SCNC: 125 MMOL/L (ref 136–145)
WBC NRBC COR # BLD AUTO: 7.53 10*3/MM3 (ref 3.4–10.8)

## 2023-11-28 PROCEDURE — 97110 THERAPEUTIC EXERCISES: CPT

## 2023-11-28 PROCEDURE — 97530 THERAPEUTIC ACTIVITIES: CPT

## 2023-11-28 PROCEDURE — 80069 RENAL FUNCTION PANEL: CPT | Performed by: INTERNAL MEDICINE

## 2023-11-28 PROCEDURE — 85025 COMPLETE CBC W/AUTO DIFF WBC: CPT | Performed by: INTERNAL MEDICINE

## 2023-11-28 PROCEDURE — 83735 ASSAY OF MAGNESIUM: CPT | Performed by: INTERNAL MEDICINE

## 2023-11-28 PROCEDURE — 92610 EVALUATE SWALLOWING FUNCTION: CPT

## 2023-11-28 PROCEDURE — C9254 INJECTION, LACOSAMIDE: HCPCS | Performed by: NURSE PRACTITIONER

## 2023-11-28 PROCEDURE — 25010000002 MAGNESIUM SULFATE IN D5W 1G/100ML (PREMIX) 1-5 GM/100ML-% SOLUTION: Performed by: INTERNAL MEDICINE

## 2023-11-28 PROCEDURE — 99232 SBSQ HOSP IP/OBS MODERATE 35: CPT | Performed by: INTERNAL MEDICINE

## 2023-11-28 PROCEDURE — 82948 REAGENT STRIP/BLOOD GLUCOSE: CPT

## 2023-11-28 PROCEDURE — 97535 SELF CARE MNGMENT TRAINING: CPT

## 2023-11-28 PROCEDURE — 25010000002 HEPARIN (PORCINE) PER 1000 UNITS: Performed by: INTERNAL MEDICINE

## 2023-11-28 PROCEDURE — 25010000002 LACOSAMIDE 200 MG/20ML SOLUTION: Performed by: NURSE PRACTITIONER

## 2023-11-28 PROCEDURE — 63710000001 INSULIN REGULAR HUMAN PER 5 UNITS: Performed by: NURSE PRACTITIONER

## 2023-11-28 PROCEDURE — 25010000002 METOCLOPRAMIDE PER 10 MG: Performed by: INTERNAL MEDICINE

## 2023-11-28 RX ORDER — CEFAZOLIN SODIUM 2 G/100ML
2000 INJECTION, SOLUTION INTRAVENOUS ONCE
Status: COMPLETED | OUTPATIENT
Start: 2023-11-29 | End: 2023-11-29

## 2023-11-28 RX ORDER — MAGNESIUM SULFATE 1 G/100ML
1 INJECTION INTRAVENOUS
Status: COMPLETED | OUTPATIENT
Start: 2023-11-28 | End: 2023-11-28

## 2023-11-28 RX ADMIN — ESCITALOPRAM OXALATE 10 MG: 10 TABLET, FILM COATED ORAL at 10:21

## 2023-11-28 RX ADMIN — LACOSAMIDE 100 MG: 10 INJECTION INTRAVENOUS at 16:01

## 2023-11-28 RX ADMIN — HEPARIN SODIUM: 5000 INJECTION INTRAVENOUS; SUBCUTANEOUS at 06:24

## 2023-11-28 RX ADMIN — ACETAMINOPHEN 650 MG: 325 TABLET, FILM COATED ORAL at 04:03

## 2023-11-28 RX ADMIN — LACOSAMIDE 100 MG: 10 INJECTION INTRAVENOUS at 04:03

## 2023-11-28 RX ADMIN — PANTOPRAZOLE SODIUM 40 MG: 40 INJECTION, POWDER, FOR SOLUTION INTRAVENOUS at 06:24

## 2023-11-28 RX ADMIN — MAGNESIUM SULFATE IN DEXTROSE 1 G: 10 INJECTION, SOLUTION INTRAVENOUS at 10:21

## 2023-11-28 RX ADMIN — CARVEDILOL 25 MG: 25 TABLET, FILM COATED ORAL at 10:21

## 2023-11-28 RX ADMIN — INSULIN HUMAN 3 UNITS: 100 INJECTION, SOLUTION PARENTERAL at 13:03

## 2023-11-28 RX ADMIN — INSULIN HUMAN 2 UNITS: 100 INJECTION, SOLUTION PARENTERAL at 00:23

## 2023-11-28 RX ADMIN — HEPARIN SODIUM: 5000 INJECTION INTRAVENOUS; SUBCUTANEOUS at 17:23

## 2023-11-28 RX ADMIN — METOCLOPRAMIDE 5 MG: 5 INJECTION, SOLUTION INTRAMUSCULAR; INTRAVENOUS at 00:23

## 2023-11-28 RX ADMIN — INSULIN HUMAN 3 UNITS: 100 INJECTION, SOLUTION PARENTERAL at 17:23

## 2023-11-28 RX ADMIN — METOCLOPRAMIDE 5 MG: 5 INJECTION, SOLUTION INTRAMUSCULAR; INTRAVENOUS at 13:02

## 2023-11-28 RX ADMIN — ASPIRIN 81 MG: 81 TABLET, CHEWABLE ORAL at 10:21

## 2023-11-28 RX ADMIN — HYDROXYCHLOROQUINE SULFATE 200 MG: 200 TABLET ORAL at 10:21

## 2023-11-28 RX ADMIN — HEPARIN SODIUM: 5000 INJECTION INTRAVENOUS; SUBCUTANEOUS at 22:02

## 2023-11-28 RX ADMIN — METOCLOPRAMIDE 5 MG: 5 INJECTION, SOLUTION INTRAMUSCULAR; INTRAVENOUS at 06:24

## 2023-11-28 RX ADMIN — HEPARIN SODIUM: 5000 INJECTION INTRAVENOUS; SUBCUTANEOUS at 10:31

## 2023-11-28 RX ADMIN — CARBIDOPA AND LEVODOPA 2.5 MG: 50; 200 TABLET, EXTENDED RELEASE ORAL at 17:24

## 2023-11-28 RX ADMIN — METOCLOPRAMIDE 5 MG: 5 INJECTION, SOLUTION INTRAMUSCULAR; INTRAVENOUS at 18:53

## 2023-11-28 RX ADMIN — MAGNESIUM SULFATE IN DEXTROSE 1 G: 10 INJECTION, SOLUTION INTRAVENOUS at 13:02

## 2023-11-28 NOTE — PROGRESS NOTES
[facility] Progress Note      Patient Name: Dee Herrera  : 1990  MRN: [id number]  Primary Care Physician:  Margarita Wodos APRN  Date of admission: 10/26/2023    Subjective     Interval History:   Follow-up end-stage renal disease currently on peritoneal dialysis    Patient is much more awake and alert, however core track was removed and she is eating breakfast, she remains hyponatremic and very weak.  Her albumin is low.  She is currently on 4.25% dialysate solution and she does not have any shortness of breath or chest pain, no nausea or vomiting.    Review of Systems:   As noted above    Objective     Vitals:   Temp:  [98.2 °F (36.8 °C)-99.5 °F (37.5 °C)] 98.7 °F (37.1 °C)  Heart Rate:  [84-87] 84  Resp:  [16-18] 16  BP: (106-129)/() 125/96    Intake/Output Summary (Last 24 hours) at 2023 0911  Last data filed at 2023 0846  Gross per 24 hour   Intake 44103 ml   Output 9000 ml   Net 1896 ml       Physical Exam:    General Appearance: alert, awake, chronically ill, no acute distress   Skin: warm and dry  HEENT: Core track is in place  Neck: supple, no JVD  Lungs: Bilateral rhonchi, breathing effort not labored  Heart: RRR, normal S1 and S2, no rub  Abdomen: soft, nontender, nondistended, normoactive bowels, PD catheter in place with clean exit site no tunnel tenderness  Extremities: no edema, cyanosis or clubbing  Neuro: Moving all extremities    Scheduled Meds:     aspirin, 81 mg, Nasogastric, Daily  carvedilol, 25 mg, Nasogastric, Q12H  chlorhexidine, 15 mL, Mouth/Throat, Q12H  Delflex-LC/4.25% Dextrose (DIANEAL) 2,000 mL with heparin (porcine) 5000 UNIT/ML 1,500 Units dialysis solution, , Intraperitoneal, Q5H  escitalopram, 10 mg, Nasogastric, Daily  First Mouthwash (Magic Mouthwash), 10 mL, Swish & Spit, Q6H  hydroxychloroquine, 200 mg, Oral, Daily  insulin regular, 2-7 Units, Subcutaneous, Q6H  Lacosamide, 100 mg, Intravenous, Q12H  lactulose, 10  g, Nasogastric, TID  lidocaine, 10 mL, Subcutaneous, Once  metoclopramide, 5 mg, Intravenous, Q6H  midodrine, 2.5 mg, Oral, TID AC  mupirocin, , Each Nare, BID  mycophenolate, 250 mg, Oral, Q12H  pantoprazole, 40 mg, Intravenous, Q AM      IV Meds:   hold, 1 each        Results Reviewed:   I have personally reviewed the results from the time of this admission to 11/28/2023 09:11 EST     Results from last 7 days   Lab Units 11/28/23 0526 11/27/23 0704 11/26/23 0612   SODIUM mmol/L 125* 123* 127*   POTASSIUM mmol/L 3.7 4.4 3.2*   CHLORIDE mmol/L 85* 84* 85*   CO2 mmol/L 27.0 25.3 29.0   BUN mg/dL 55* 54* 56*   CREATININE mg/dL 4.97* 5.15* 4.58*   CALCIUM mg/dL 8.9 9.1 9.2   GLUCOSE mg/dL 104* 79 112*       Estimated Creatinine Clearance: 12.1 mL/min (A) (by C-G formula based on SCr of 4.97 mg/dL (H)).    Results from last 7 days   Lab Units 11/28/23 0526 11/27/23  0704 11/26/23  0612 11/25/23  0552 11/24/23  0556 11/23/23  0428 11/22/23  0550   MAGNESIUM mg/dL 1.4*  --   --   --  1.7  --  2.2   PHOSPHORUS mg/dL 4.2 4.1 2.1*   < > 2.6   < > 3.7    < > = values in this interval not displayed.             Results from last 7 days   Lab Units 11/28/23 0526 11/27/23  0704 11/26/23  0612 11/25/23  0552 11/24/23  0556   WBC 10*3/mm3 7.53 7.31 7.92 8.69 10.59   HEMOGLOBIN g/dL 9.4* 9.6* 10.2* 9.4* 9.6*   PLATELETS 10*3/mm3 201 203 212 182 200             Assessment / Plan     ASSESSMENT:  End-stage renal disease on peritoneal dialysis etiology of her ESRD is related to lupus nephritis.  No evidence of hypervolemia but the patient remains hyponatremic  Hyponatremia, sodium 125, volume status improved but the sodium is not improved.  Despite maximum fluid removal with dialysis and fluid restriction  Acute CVA, cortical infarct in the anterior inferior medial right frontal lobe and subacute subdural hematoma right occipital.  Ascending aortic aneurysm with subacute/chronic aortic root dissection, status postrepair of  proximal aortic aneurysm 11/2/2023.  Reexploration for cardiac tamponade on 11/6/2023.  Seizure disorder appears to be stable currently on lacosamide  Anemia of chronic kidney disease hemoglobin today is 9.4  Hypokalemia, stable and resolved potassium 3.7  Hypomagnesemia, magnesium 1.4.    PLAN:  I had a long discussion with the patient and her mother about switching her from peritoneal dialysis to hemodialysis to help her nutritional status changes is losing protein through the peritoneal dialysate also to help correct her high poor natremia since it will impede her ability for rehab  Continue fluid restriction 1000 cc per 24 hours  I will ask vascular surgery to place tunneled dialysis catheter  Replete magnesium  Surveillance labs    I reviewed the chart and other providers notes, I reviewed labs.  I discussed the case with the patient and her mother on FaceTime, also discussed the case with Dr. Taylor  I discussed the case with the patient and with the nursing staff.    Thank you for involving us in the care of Dee Herrera.  Please feel free to call with any questions.    Isaac Diaz MD  11/28/23  09:11 [facility]    [facility]  [phone]    Please note that portions of this note were completed with a voice recognition program.

## 2023-11-28 NOTE — PLAN OF CARE
"Goal Outcome Evaluation:  Plan of Care Reviewed With: patient           Outcome Evaluation: Pt was sitting up in bed and agreeable to therapy. Pt c/o pain on bottom of feet at beginning of therapy, which later she described as \"numbness\" on bottoms of feet while ambualting. Pt slowly ambulated w Rwx 40' CGA. Pt had low tolerance for supine and seated ther ex due to feeling weak. Pt was worn out from therapy and returned to bed to rest.         "

## 2023-11-28 NOTE — CONSULTS
Name: Dee Herrera ADMIT: 10/26/2023   : 1990  PCP: Margarita Woods APRN    MRN: [id number] LOS: 33 days   AGE/SEX: 33 y.o. female  ROOM: [facility]     Inpatient Vascular Surgery Consult  Consult performed by: Diandra Adhikari MD  Consult ordered by: Isaac Diaz MD Ashlee Ann Vinyard, MD     LOS: 33 days   Patient Care Team:  Margarita Wodos APRN as PCP - General (Nurse Practitioner)  Winnie Sanchez MD as Referring Physician (Obstetrics and Gynecology)  Norberto Almaraz MD PhD as Consulting Physician (Hematology and Oncology)  Lupis Thomas MD as Consulting Physician (Nephrology)  Hair Mckeon MD as Consulting Physician (Nephrology)  Juana Taylor MD as Consulting Physician (Cardiology)    Subjective     History of Present Illness  33 y.o. female with multiple long term illnesses, including ESRD on PD, HTN, systemic lupus erythematosus, CVA, ascending aortic aneurysm s/p repair on 23 who is now on PD but has severe malnutrition and hyponatremia.  Nephrology has recommended transitioning to hemodialysis to help address her fluid status and her malnutrition, as she has significant hypoalbuminemia.  We were asked to place a tunneled dialysis catheter for hemodialysis.  She is sitting up in a chair and her sister is at the bedside.  I reviewed her chart and she has had a previous right IJV shiley catheter and tunneled catheter.  She is somewhat apprehensive about the procedure and the need for transition to hemodialysis.  I discussed this with her and Dr. Diaz via phone.      Review of Systems   All other systems reviewed and are negative.      Past Medical History:   Diagnosis Date    Anasarca     PER CT SCAN    Dry skin     ESRD (end stage renal disease) on dialysis     MARCO COLE, KATIE FRASER    History of abdominal pain     History of anemia     History of transfusion     Hypertension     Iron deficiency anemia 2021    Lupus (systemic  lupus erythematosus) 07/30/2022    Migraine     Other specified nutritional anemias     Pericardial effusion     Renal insufficiency     Seizures     STATES LAST WAS 1/2023    Shortness of breath     OCCASIONAL    Vitamin D deficiency 09/27/2021       Past Surgical History:   Procedure Laterality Date    ASCENDING AORTIC ANEURYSM REPAIR W/ MECHANICAL AORTIC VALVE REPLACEMENT N/A 11/2/2023    Procedure: CHETNA STERNOTOMY, AORTIC ROOT REPLACEMENT WITH VALVE SPARING MISHEL PROCEDURE, REPLACEMENT OF ASCENDING AORTA, RIGHT FEMORAL DIALYSIS CATHETER PLACEMENT AND PRP;  Surgeon: Chris Medina MD;  Location: [facility];  Service: Cardiothoracic;  Laterality: N/A;    CARDIAC CATHETERIZATION N/A 06/14/2023    Procedure: Coronary angiography;  Surgeon: Juana Taylor MD;  Location: [facility] CATH INVASIVE LOCATION;  Service: Cardiovascular;  Laterality: N/A;    CARDIAC CATHETERIZATION N/A 06/14/2023    Procedure: Left heart cath;  Surgeon: Juana Taylor MD;  Location: [facility] CATH INVASIVE LOCATION;  Service: Cardiovascular;  Laterality: N/A;    CARDIAC CATHETERIZATION N/A 06/14/2023    Procedure: Right Heart Cath;  Surgeon: Juana Taylor MD;  Location: [facility] CATH INVASIVE LOCATION;  Service: Cardiovascular;  Laterality: N/A;    COLONOSCOPY N/A 7/20/2023    Procedure: COLONOSCOPY to cecum with biopsy;  Surgeon: Drew Kaminski MD;  Location: [facility];  Service: Gastroenterology;  Laterality: N/A;  PRE - diarrhea, constipation  POST - fair prep, normal    CORONARY ARTERY BYPASS GRAFT N/A 11/6/2023    Procedure: STERNAL EXPLORATION AND WASH OUT;  Surgeon: Jr Mitesh Quiroz MD;  Location: [facility];  Service: Cardiothoracic;  Laterality: N/A;    ENDOSCOPY N/A 7/20/2023    Procedure: ESOPHAGOGASTRODUODENOSCOPY with biopsy;  Surgeon: Drew Kaminski MD;  Location: [facility];  Service: Gastroenterology;  Laterality: N/A;  PRE - abn ct abd  POST - gastritis    INSERTION HEMODIALYSIS CATHETER N/A 07/26/2022     Procedure: RIGHT TUNNELED DIALYSIS CATHETER PLACEMENT;  Surgeon: Diadnra Adhikari MD;  Location: [facility] MAIN OR;  Service: Vascular;  Laterality: N/A;    INSERTION PERITONEAL DIALYSIS CATHETER N/A 04/03/2023    Procedure: INSERTION PERITONEAL DIALYSIS CATHETER LAPAROSCOPIC, omentumpexy;  Surgeon: Jemal Loyola MD;  Location:  [facility];  Service: General;  Laterality: N/A;    TONSILLECTOMY         Family History   Problem Relation Age of Onset    Autoimmune disease Mother     Anemia Mother     Diabetes Sister     Anemia Brother     Diabetes Maternal Grandmother     Hypertension Maternal Grandmother     Cancer Maternal Grandmother     Sickle cell anemia Cousin     Malig Hyperthermia Neg Hx          Social History     Tobacco Use    Smoking status: Former     Types: Cigars     Passive exposure: Past    Smokeless tobacco: Never    Tobacco comments:     Patient smoked black & mild   Vaping Use    Vaping Use: Never used   Substance Use Topics    Alcohol use: Yes     Comment: social    Drug use: Yes     Types: Marijuana     Comment: OCCASIONAL       Allergies: Minoxidil    Medications Prior to Admission   Medication Sig Dispense Refill Last Dose    carvedilol (COREG) 25 MG tablet Take 1 tablet by mouth Every 12 (Twelve) Hours. 60 tablet 0 10/25/2023    famotidine (PEPCID) 20 MG tablet Take 1 tablet by mouth Daily. 30 tablet 0 10/25/2023    hydroxychloroquine (PLAQUENIL) 200 MG tablet Take 1 tablet by mouth Daily.   10/25/2023    mycophenolate (CELLCEPT) 500 MG tablet Take 0.5 tablets by mouth Every 12 (Twelve) Hours. 30 tablet 0 10/25/2023    ondansetron (Zofran) 4 MG tablet Take 1 tablet by mouth Every 8 (Eight) Hours As Needed for Nausea or Vomiting. 30 tablet 1 10/25/2023    predniSONE (DELTASONE) 10 MG tablet Take 1 tablet by mouth Daily. 30 tablet 0 10/25/2023    sevelamer (RENVELA) 800 MG tablet Take 1 tablet by mouth 3 (Three) Times a Day With Meals.   10/25/2023    NIFEdipine XL (PROCARDIA XL)  90 MG 24 hr tablet Take 1 tablet by mouth Daily. (Patient not taking: Reported on 10/26/2023) 30 tablet 0 Not Taking    polyethylene glycol (MIRALAX) 17 GM/SCOOP powder Take 17 g by mouth As Needed. (Patient not taking: Reported on 10/26/2023)   Not Taking    potassium chloride 10 MEQ CR tablet Take 1 tablet by mouth Daily.   More than a month    sennosides-docusate (PERICOLACE) 8.6-50 MG per tablet Take 2 tablets by mouth Daily As Needed for Constipation. (Patient not taking: Reported on 10/26/2023) 30 tablet 1 Not Taking    simethicone (MYLICON) 80 MG chewable tablet Chew 1 tablet Every 6 (Six) Hours As Needed. (Patient not taking: Reported on 10/26/2023)   Not Taking     aspirin, 81 mg, Nasogastric, Daily  carvedilol, 25 mg, Nasogastric, Q12H  chlorhexidine, 15 mL, Mouth/Throat, Q12H  Delflex-LC/4.25% Dextrose (DIANEAL) 2,000 mL with heparin (porcine) 5000 UNIT/ML 1,500 Units dialysis solution, , Intraperitoneal, Q5H  escitalopram, 10 mg, Nasogastric, Daily  First Mouthwash (Magic Mouthwash), 10 mL, Swish & Spit, Q6H  hydroxychloroquine, 200 mg, Oral, Daily  insulin regular, 2-7 Units, Subcutaneous, Q6H  Lacosamide, 100 mg, Intravenous, Q12H  lactulose, 10 g, Nasogastric, TID  lidocaine, 10 mL, Subcutaneous, Once  metoclopramide, 5 mg, Intravenous, Q6H  midodrine, 2.5 mg, Oral, TID AC  mupirocin, , Each Nare, BID  mycophenolate, 250 mg, Oral, Q12H  pantoprazole, 40 mg, Intravenous, Q AM      hold, 1 each        acetaminophen **OR** acetaminophen **OR** acetaminophen    bisacodyl    bisacodyl    dextrose    dextrose    glucagon (human recombinant)    heparin (porcine)    hold    ipratropium-albuterol    [] Morphine **AND** naloxone    nitroglycerin    ondansetron    polyethylene glycol      Objective   Temp:  [98.2 °F (36.8 °C)-99.5 °F (37.5 °C)] 98.7 °F (37.1 °C)  Heart Rate:  [84-87] 84  Resp:  [16-18] 16  BP: (106-129)/() 125/96    I/O this shift:  In: 4150 [P.O.:150]  Out: 8400     Physical  Exam  Vitals reviewed.   Constitutional:       General: She is not in acute distress.  HENT:      Head: Normocephalic and atraumatic.      Right Ear: External ear normal.      Left Ear: External ear normal.      Nose: Nose normal.      Mouth/Throat:      Mouth: Mucous membranes are moist.      Pharynx: Oropharynx is clear.   Eyes:      Extraocular Movements: Extraocular movements intact.      Conjunctiva/sclera: Conjunctivae normal.      Pupils: Pupils are equal, round, and reactive to light.   Cardiovascular:      Rate and Rhythm: Normal rate and regular rhythm.   Pulmonary:      Effort: Pulmonary effort is normal. No respiratory distress.   Abdominal:      General: Abdomen is flat. There is no distension.      Palpations: Abdomen is soft.      Comments: PD catheter in place, draining   Musculoskeletal:         General: No swelling or deformity.      Cervical back: Normal range of motion. No rigidity.   Skin:     General: Skin is warm and dry.      Capillary Refill: Capillary refill takes less than 2 seconds.   Neurological:      General: No focal deficit present.      Mental Status: She is alert.   Psychiatric:         Mood and Affect: Mood normal.         Behavior: Behavior normal.         Results from last 7 days   Lab Units 11/28/23  0526 11/27/23  0704 11/26/23  0612 11/25/23  0552 11/24/23  0556 11/23/23  0428 11/22/23  0550   WBC 10*3/mm3 7.53 7.31 7.92 8.69 10.59 12.12* 12.10*   HEMOGLOBIN g/dL 9.4* 9.6* 10.2* 9.4* 9.6* 9.5* 10.1*   PLATELETS 10*3/mm3 201 203 212 182 200 199 235     Results from last 7 days   Lab Units 11/28/23  0526 11/27/23  0704 11/26/23  0612 11/25/23  0552 11/24/23  0556 11/23/23  0428 11/22/23  0550   SODIUM mmol/L 125* 123* 127* 126* 127* 127* 127*   POTASSIUM mmol/L 3.7 4.4 3.2* 3.5 3.6 3.3* 4.0   CHLORIDE mmol/L 85* 84* 85* 86* 87* 88* 88*   CO2 mmol/L 27.0 25.3 29.0 28.5 29.8* 28.1 27.5   BUN mg/dL 55* 54* 56* 48* 49* 40* 43*   CREATININE mg/dL 4.97* 5.15* 4.58* 4.78* 4.56* 4.98*  4.88*   GLUCOSE mg/dL 104* 79 112* 106* 107* 120* 112*   Estimated Creatinine Clearance: 12.1 mL/min (A) (by C-G formula based on SCr of 4.97 mg/dL (H)).      Imaging Studies:  N/a      Active Hospital Problems    Diagnosis  POA    **Dissecting ascending aortic aneurysm [I71.010]  Yes    Recent cerebrovascular accident (CVA) [Z86.73]  Yes    Aortic valve lesion [I35.8]  Yes    Severe aortic valve regurgitation [I35.1]  Yes    Hyponatremia [E87.1]  Yes    Poor appetite [R63.0]  Yes    Moderate malnutrition [E44.0]  Yes    Chronic diastolic CHF (congestive heart failure) [I50.32]  Yes    Anemia due to chronic kidney disease, on chronic dialysis [N18.6, D63.1, Z99.2]  Not Applicable    Peritoneal dialysis catheter in place [Z99.2]  Not Applicable    Essential hypertension [I10]  Yes    End stage renal disease on dialysis [N18.6, Z99.2]  Not Applicable    Seizure disorder [G40.909]  Yes    Elevated liver function tests [R79.89]  Yes    Pericardial effusion [I31.39]  Yes    Systemic lupus erythematosus [M32.9]  Yes    Thrombocytopenia [D69.6]  Yes    Hypertension secondary to other renal disorders [I15.1]  Yes    Pancytopenia [D61.818]  Yes    Vitamin D deficiency [E55.9]  Yes      Resolved Hospital Problems    Diagnosis Date Resolved POA    Abdominal pain [R10.9] 10/28/2023 Yes     Problem Points:  2:  Patient has an established problem, worsening  Total problem points:2    Data Points:  1:  I have reviewed or order clinical lab test  1:  I have reviewed or order radiology test (except heart catheterization or echo)  2:  I have reviewed and summation of old records and/or discussed the patients care with another health care provider  Total data points:4 or more    Risk Points:  Moderate:  Minor surgery with risk factors, major elective surgery without risk factors, perscription medications, or IVF with additives    MDM Prob point Data point Risk   SF 1 1 Minimal   Low 2 2 Low   Mod 3 3 Moderate   High 4 4 High     Code  MDM History Exam Time   58892 SF/Low Detailed Detailed 30   18908 Mod Comprehensive Comprehensive 50   64177 High Comprehensive Comprehensive 70     Detailed history:  4 elements HPI or status of 3 chronic problems; 2-9 system ROS  Comprehensive:  4 elements HPI or status of 3 chronic problems;  10 system ROS    Detailed Exam:  12 findings from any organ system  Comprehensive Exam:  2 findings from each of 9 systems.     Billin    Assessment & Plan       Dissecting ascending aortic aneurysm    Vitamin D deficiency    Thrombocytopenia    Hypertension secondary to other renal disorders    Pancytopenia    Systemic lupus erythematosus    Pericardial effusion    End stage renal disease on dialysis    Seizure disorder    Elevated liver function tests    Essential hypertension    Peritoneal dialysis catheter in place    Anemia due to chronic kidney disease, on chronic dialysis    Hyponatremia    Poor appetite    Moderate malnutrition    Chronic diastolic CHF (congestive heart failure)    Aortic valve lesion    Severe aortic valve regurgitation    Recent cerebrovascular accident (CVA)        33 y.o. female with ESRD on PD who has hyponatremia and severe malnutrition.  Nephrology would like to transition to hemodialysis to better manage her fluid status and have requested us to place a tunneled catheter    -discussed with Dr. Diaz, the patient, and her sister at bedside.  I appreciate Dr. Diaz's availability and explaining the rationale for transitioning to hemodialysis to the patient and her family again.  I have placed her on the schedule for tomorrow for tunneled catheter placement and placed pre operative orders for this.     I discussed the patients findings and my recommendations with patient, family, and consulting provider.  Please call my office with any question: [phone]    Diandra Adhikari MD  23  12:50 EST

## 2023-11-28 NOTE — DISCHARGE PLACEMENT REQUEST
"Luz Elena Herrera (33 y.o. Female)       Date of Birth   1990    Social Security Number       Address   [address] [facility] [address] [place], [place] [address]    Home Phone   [phone]    MRN   [id number]       Alevism   Zoroastrian    Marital Status   Single                            Admission Date   10/26/23    Admission Type   Emergency    Admitting Provider   Mike Lezama MD    Attending Provider   Torres Gay MD    Department, Room/Bed   [address], S609/1       Discharge Date       Discharge Disposition       Discharge Destination                                 Attending Provider: Torres Gay MD    Allergies: Minoxidil    Isolation: None   Infection: None   Code Status: CPR    Ht: 165 cm (64.96\")   Wt: 46.7 kg (103 lb)    Admission Cmt: None   Principal Problem: Dissecting ascending aortic aneurysm [I71.010]                   Active Insurance as of 10/26/2023       Primary Coverage       Payor Plan Insurance Group Employer/Plan Group    MEDICARE MEDICARE A & B        Payor Plan Address Payor Plan Phone Number Payor Plan Fax Number Effective Dates    [address] [phone]  3/1/2023 - None Entered    [place] [id number]         Subscriber Name Subscriber Birth Date Member ID       LUZ ELENA HERRERA [place] 1990 [id number]                     Emergency Contacts        (Rel.) Home Phone Work Phone Mobile Phone    DANYELL HERRERA \"Sajrou\" ([employer]CS) (Mother) [phone] -- --    PAULINE HERRERA (Grandparent) [phone] -- [phone]              "

## 2023-11-28 NOTE — PROGRESS NOTES
" LOS: 33 days   Patient Care Team:  Margarita Woods APRN as PCP - General (Nurse Practitioner)  Winnie Sanchez MD as Referring Physician (Obstetrics and Gynecology)  Norberto Almaraz MD PhD as Consulting Physician (Hematology and Oncology)  Lupis Thomas MD as Consulting Physician (Nephrology)  Hair Mckeon MD as Consulting Physician (Nephrology)  Juana Taylor MD as Consulting Physician (Cardiology)    Chief Complaint: Post-op    Subjective:  Symptoms:  Stable.  No shortness of breath, cough or chest pain.    Diet:  Poor intake (Cortrak had to be removed--refusing to allow it to be replaced. Also refusing PEG placement.).  No nausea or vomiting.    Activity level: Impaired due to weakness.    Pain:  She complains of pain that is mild.  She reports pain is unchanged.  Pain is well controlled.  (Intermittent abdominal cramping).       Vital Signs  Temp:  [98.2 °F (36.8 °C)-99.5 °F (37.5 °C)] 98.7 °F (37.1 °C)  Heart Rate:  [84-87] 84  Resp:  [16-18] 16  BP: (106-129)/() 125/96  Body mass index is 17.16 kg/m².    Intake/Output Summary (Last 24 hours) at 11/28/2023 0959  Last data filed at 11/28/2023 0846  Gross per 24 hour   Intake 87436 ml   Output 9000 ml   Net 1896 ml     I/O this shift:  In: 2150 [P.O.:150]  Out: 2400           11/25/23  0316 11/26/23  0527 11/27/23  0635   Weight: 46.9 kg (103 lb 4.8 oz) 46.9 kg (103 lb 5.3 oz) 46.7 kg (103 lb)     Objective:  General Appearance:  Comfortable and in no acute distress.    Vital signs: (most recent): Blood pressure 125/96, pulse 84, temperature 98.7 °F (37.1 °C), temperature source Oral, resp. rate 16, height 165 cm (64.96\"), weight 46.7 kg (103 lb), last menstrual period 08/10/2022, SpO2 100%, not currently breastfeeding.  Vital signs are normal.  No fever.    Output: No urine output and producing stool.    HEENT: (NG tube in place)    Lungs:  Normal effort and normal respiratory rate.  There are decreased breath sounds.    Heart: " "Normal rate.  Regular rhythm.  (SR on tele monitor)  Abdomen: Abdomen is soft and non-distended.  Bowel sounds are normal.     Pulses: Distal pulses are intact.    Neurological: Patient is oriented to person, place and time.  (drowsy).    Skin:  Warm and dry.  (Sternal wound healing well without erythema or drainage)          Results Review:        WBC WBC   Date Value Ref Range Status   11/28/2023 7.53 3.40 - 10.80 10*3/mm3 Final   11/27/2023 7.31 3.40 - 10.80 10*3/mm3 Final   11/26/2023 7.92 3.40 - 10.80 10*3/mm3 Final      HGB Hemoglobin   Date Value Ref Range Status   11/28/2023 9.4 (L) 12.0 - 15.9 g/dL Final   11/27/2023 9.6 (L) 12.0 - 15.9 g/dL Final   11/26/2023 10.2 (L) 12.0 - 15.9 g/dL Final      HCT Hematocrit   Date Value Ref Range Status   11/28/2023 29.9 (L) 34.0 - 46.6 % Final   11/27/2023 30.2 (L) 34.0 - 46.6 % Final   11/26/2023 31.5 (L) 34.0 - 46.6 % Final      Platelets Platelets   Date Value Ref Range Status   11/28/2023 201 140 - 450 10*3/mm3 Final   11/27/2023 203 140 - 450 10*3/mm3 Final   11/26/2023 212 140 - 450 10*3/mm3 Final        PT/INR:  No results found for: \"PROTIME\"/No results found for: \"INR\"    Sodium Sodium   Date Value Ref Range Status   11/28/2023 125 (L) 136 - 145 mmol/L Final   11/27/2023 123 (L) 136 - 145 mmol/L Final   11/26/2023 127 (L) 136 - 145 mmol/L Final      Potassium Potassium   Date Value Ref Range Status   11/28/2023 3.7 3.5 - 5.2 mmol/L Final   11/27/2023 4.4 3.5 - 5.2 mmol/L Final   11/26/2023 3.2 (L) 3.5 - 5.2 mmol/L Final      Chloride Chloride   Date Value Ref Range Status   11/28/2023 85 (L) 98 - 107 mmol/L Final   11/27/2023 84 (L) 98 - 107 mmol/L Final   11/26/2023 85 (L) 98 - 107 mmol/L Final      Bicarbonate CO2   Date Value Ref Range Status   11/28/2023 27.0 22.0 - 29.0 mmol/L Final   11/27/2023 25.3 22.0 - 29.0 mmol/L Final   11/26/2023 29.0 22.0 - 29.0 mmol/L Final      BUN BUN   Date Value Ref Range Status   11/28/2023 55 (H) 6 - 20 mg/dL Final "   11/27/2023 54 (H) 6 - 20 mg/dL Final   11/26/2023 56 (H) 6 - 20 mg/dL Final      Creatinine Creatinine   Date Value Ref Range Status   11/28/2023 4.97 (H) 0.57 - 1.00 mg/dL Final   11/27/2023 5.15 (H) 0.57 - 1.00 mg/dL Final   11/26/2023 4.58 (H) 0.57 - 1.00 mg/dL Final      Calcium Calcium   Date Value Ref Range Status   11/28/2023 8.9 8.6 - 10.5 mg/dL Final   11/27/2023 9.1 8.6 - 10.5 mg/dL Final   11/26/2023 9.2 8.6 - 10.5 mg/dL Final      Magnesium Magnesium   Date Value Ref Range Status   11/28/2023 1.4 (L) 1.6 - 2.6 mg/dL Final            aspirin, 81 mg, Nasogastric, Daily  carvedilol, 25 mg, Nasogastric, Q12H  chlorhexidine, 15 mL, Mouth/Throat, Q12H  Delflex-LC/4.25% Dextrose (DIANEAL) 2,000 mL with heparin (porcine) 5000 UNIT/ML 1,500 Units dialysis solution, , Intraperitoneal, Q5H  escitalopram, 10 mg, Nasogastric, Daily  First Mouthwash (Magic Mouthwash), 10 mL, Swish & Spit, Q6H  hydroxychloroquine, 200 mg, Oral, Daily  insulin regular, 2-7 Units, Subcutaneous, Q6H  Lacosamide, 100 mg, Intravenous, Q12H  lactulose, 10 g, Nasogastric, TID  lidocaine, 10 mL, Subcutaneous, Once  magnesium sulfate, 1 g, Intravenous, Q1H  metoclopramide, 5 mg, Intravenous, Q6H  midodrine, 2.5 mg, Oral, TID AC  mupirocin, , Each Nare, BID  mycophenolate, 250 mg, Oral, Q12H  pantoprazole, 40 mg, Intravenous, Q AM      hold, 1 each      Assessment & Plan  - Ascending aortic aneurysm with dissection- s/p ascending aortic dissection repair/proximal replacement, gacron interposition graft, kelli procedure; insertion of right femoral shiley---POD#25; Dr. Medina  - Cardiac tamponade- reexploration for bleeding, removal of large clot compressing the RV---POD#20; Dr. Lowe  - severe aortic valve insufficiency  - Encephalopathy, improving   - ESRD on PD  - Lupus--Cellcept/plaquenil restarted 11/23 and patient seems to be improving  - Epilepsy  - chronic immunosuppression  - hypertension  - chronic anemia--stable  -  TCP--chronic; improving  - Depression--started on lexapro 11/14  - right MCA CVA--continue ASA per neuro  - hyponatremia--hypervolemic, improving--nephrology following  - malnutrition r/t decreased caloric intake--nutrition following  - hypokalemia--resolved, potassium 4.4    Patient is sitting up in bed and looks much better overall. She is smiling and talking about her family. Denies chest pain, palpitations, SOB, abdominal pain.  Spoke with RN re: patient's mobility; she has apparently been getting up and going to the restroom and sitting up in the chair the last few days. She allowed OT to work with her yesterday as well.  Cortrak was clogged so this was removed yesterday by RD; patient would not let it be replaced. PO intake has been slowly improving but remains inadequate--had chicken and soup yesterday. Patient reported to me that she would rather have PEG tube placed due to discomfort with NG tube.  Labs slightly improved this morning. Magnesium 1.4; replete per nephrology. Potassium 3.7.  Patient has agreed to switch from PD to hemodialysis; vascular surgery consulted by nephrology for dialysis catheter.  Palliative Care Team signed off yesterday and will see PRN.  Paint sternal incision with betadine daily.  Continue aggressive PT/OT.  D/c plan TBD--SNF vs. Rehab.  Continue supportive care.      JACKIE Porter  11/28/23  09:59 EST

## 2023-11-28 NOTE — PLAN OF CARE
"Goal Outcome Evaluation:  Plan of Care Reviewed With: patient, sibling        Progress: improving  Outcome Evaluation: OT re-eval completed today due to long length of stay. Pt with complex hospital stay but the past few sessions she has been participating well with OT and is able to progress activity. She reports she is feeding self now \"mostly\" as well as completing grooming tasks this morning. She was able to don/doff socks SBA and transfer to the toilet CGA with rwx. She remains generally very weak with UE MMT and is very deconditioned. She would benefit from ongoing OT but is making progress towards all goals. With improvement in activity tolerance she may benefit from acute rehab at [place].      Anticipated Discharge Disposition (OT): inpatient rehabilitation facility, skilled nursing facility  "

## 2023-11-28 NOTE — THERAPY DISCHARGE NOTE
Acute Care - Speech Language Pathology   Swallow Treatment Note/Discharge [facility]     Patient Name: Dee Herrera  : 1990  MRN: [id number]  Today's Date: 2023               Admit Date: 10/26/2023    Visit Dx:    ICD-10-CM ICD-9-CM   1. Shortness of breath  R06.02 786.05   2. ESRD (end stage renal disease) on dialysis  N18.6 585.6    Z99.2 V45.11   3. Hyponatremia  E87.1 276.1   4. Generalized abdominal pain  R10.84 789.07   5. Elevated lactic acid level  R79.89 276.2   6. Elevated troponin  R79.89 790.6   7. Severe aortic valve regurgitation  I35.1 424.1   8. Pericardial effusion  I31.39 423.9   9. S/P AVR  Z95.2 V43.3   10. 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 syndrome    Systemic lupus erythematosus    Lupus nephritis, ISN/RPS class IV    Hypokalemia    Hypocalcemia    COVID-19    Hospital discharge follow-up    Stage 5 chronic kidney disease    Cardiac cirrhosis    Pancreatitis    Duodenitis    Regional enteritis of small bowel    Pericardial effusion    End stage renal disease on dialysis    Hemodialysis status    Seizure disorder    Elevated liver function tests    C. difficile colitis    Anemia, chronic disease    Essential hypertension    Peritoneal dialysis catheter in place    Anemia due to chronic kidney disease, on chronic dialysis    Alternating constipation and diarrhea    Abnormal stress test    Hyponatremia    Poor appetite    Moderate malnutrition    Chronic diastolic CHF (congestive heart failure)    Aortic valve lesion    Severe aortic valve regurgitation    Dissecting ascending aortic  aneurysm    Recent cerebrovascular accident (CVA)     Past Medical History:   Diagnosis Date    Anasarca     PER CT SCAN    Dry skin     ESRD (end stage renal disease) on dialysis     MARCO COLE, KATIE FRASER    History of abdominal pain     History of anemia     History of transfusion     Hypertension     Iron deficiency anemia 09/27/2021    Lupus (systemic lupus erythematosus) 07/30/2022    Migraine     Other specified nutritional anemias     Pericardial effusion     Renal insufficiency     Seizures     STATES LAST WAS 1/2023    Shortness of breath     OCCASIONAL    Vitamin D deficiency 09/27/2021     Past Surgical History:   Procedure Laterality Date    ASCENDING AORTIC ANEURYSM REPAIR W/ MECHANICAL AORTIC VALVE REPLACEMENT N/A 11/2/2023    Procedure: CHETNA STERNOTOMY, AORTIC ROOT REPLACEMENT WITH VALVE SPARING MISHEL PROCEDURE, REPLACEMENT OF ASCENDING AORTA, RIGHT FEMORAL DIALYSIS CATHETER PLACEMENT AND PRP;  Surgeon: Chris Medina MD;  Location: [facility] CVOR;  Service: Cardiothoracic;  Laterality: N/A;    CARDIAC CATHETERIZATION N/A 06/14/2023    Procedure: Coronary angiography;  Surgeon: Juana Taylor MD;  Location: [facility] CATH INVASIVE LOCATION;  Service: Cardiovascular;  Laterality: N/A;    CARDIAC CATHETERIZATION N/A 06/14/2023    Procedure: Left heart cath;  Surgeon: Juana Taylor MD;  Location: [facility] CATH INVASIVE LOCATION;  Service: Cardiovascular;  Laterality: N/A;    CARDIAC CATHETERIZATION N/A 06/14/2023    Procedure: Right Heart Cath;  Surgeon: Juana Taylor MD;  Location: [facility] CATH INVASIVE LOCATION;  Service: Cardiovascular;  Laterality: N/A;    COLONOSCOPY N/A 7/20/2023    Procedure: COLONOSCOPY to cecum with biopsy;  Surgeon: Drew Kaminski MD;  Location: [facility] ENDOSCOPY;  Service: Gastroenterology;  Laterality: N/A;  PRE - diarrhea, constipation  POST - fair prep, normal    CORONARY ARTERY BYPASS GRAFT N/A 11/6/2023    Procedure: STERNAL EXPLORATION AND WASH OUT;   Surgeon: Jr Mitesh Quiroz MD;  Location: [facility] CVOR;  Service: Cardiothoracic;  Laterality: N/A;    ENDOSCOPY N/A 7/20/2023    Procedure: ESOPHAGOGASTRODUODENOSCOPY with biopsy;  Surgeon: Drew Kaminski MD;  Location: [facility] ENDOSCOPY;  Service: Gastroenterology;  Laterality: N/A;  PRE - abn ct abd  POST - gastritis    INSERTION HEMODIALYSIS CATHETER N/A 07/26/2022    Procedure: RIGHT TUNNELED DIALYSIS CATHETER PLACEMENT;  Surgeon: Diandra Adhikari MD;  Location: [facility] MAIN OR;  Service: Vascular;  Laterality: N/A;    INSERTION PERITONEAL DIALYSIS CATHETER N/A 04/03/2023    Procedure: INSERTION PERITONEAL DIALYSIS CATHETER LAPAROSCOPIC, omentumpexy;  Surgeon: Jemal Loyola MD;  Location: [facility] MAIN OR;  Service: General;  Laterality: N/A;    TONSILLECTOMY         SLP Recommendation and Plan                    Anticipated Discharge Disposition (SLP): No further SLP services warranted (11/28/23 1114)                    Anticipated Discharge Disposition (SLP): No further SLP services warranted (11/28/23 1114)           Reason for Discharge: all goals and outcomes met, no further needs identified (11/28/23 1114)                     SWALLOW EVALUATION (last 72 hours)       SLP Adult Swallow Evaluation       Row Name 11/28/23 1045                   Rehab Evaluation    Document Type re-evaluation  -CB        Subjective Information no complaints  -CB        Patient Observations alert;cooperative;agree to therapy  -CB        Patient Effort good  -CB           General Information    Patient Profile Reviewed yes  -CB           (LTG) Patient will demonstrate functional swallow for    Diet Texture (Demonstrate functional swallow) regular textures  -CB        Liquid viscosity (Demonstrate functional swallow) thin liquids  -CB        [place] (Demonstrate functional swallow) independently (over 90% accuracy)  -CB        Time Frame (Demonstrate functional swallow) by discharge  -CB         Progress/Outcomes (Demonstrate functional swallow) goal met  -CB        Comment (Demonstrate functional swallow) No issues with mastication of regular diet, no further ST needed at this time.  -CB                  User Key  (r) = Recorded By, (t) = Taken By, (c) = Cosigned By      Initials Name Effective Dates    Sophia Velez CCC-SLP 06/01/23 -                     EDUCATION  The patient has been educated in the following areas:   Dysphagia (Swallowing Impairment).         SLP GOALS       Row Name 11/28/23 1045             (LTG) Patient will demonstrate functional swallow for    Diet Texture (Demonstrate functional swallow) regular textures  -CB      Liquid viscosity (Demonstrate functional swallow) thin liquids  -CB      [place] (Demonstrate functional swallow) independently (over 90% accuracy)  -CB      Time Frame (Demonstrate functional swallow) by discharge  -CB      Progress/Outcomes (Demonstrate functional swallow) goal met  -CB      Comment (Demonstrate functional swallow) No issues with mastication of regular diet, no further ST needed at this time.  -CB                User Key  (r) = Recorded By, (t) = Taken By, (c) = Cosigned By      Initials Name Provider Type    Sophia Velez CCC-SLP Speech and Language Pathologist                         Time Calculation:    Time Calculation- SLP       Row Name 11/28/23 1115             Time Calculation- SLP    SLP Start Time 1045  -CB      SLP Received On 11/28/23  -CB         Untimed Charges    42394-CL Treatment Swallow Minutes 30  -CB         Total Minutes    Untimed Charges Total Minutes 30  -CB       Total Minutes 30  -CB                User Key  (r) = Recorded By, (t) = Taken By, (c) = Cosigned By      Initials Name Provider Type    Sophia Velez CCC-SLP Speech and Language Pathologist                    Therapy Charges for Today       Code Description Service Date Service Provider Modifiers Qty    [id number]  ST EVAL ORAL PHARYNG SWALLOW 2  11/28/2023 Sophia Corado CCC-SLP GN 1                 SLP Discharge Summary  Anticipated Discharge Disposition (SLP): No further SLP services warranted  Reason for Discharge: all goals and outcomes met, no further needs identified  Progress Toward Achieving Short/long Term Goals: all goals met within established timelines  Discharge Destination: unknown    CARSON Oropeza  11/28/2023

## 2023-11-28 NOTE — THERAPY PROGRESS REPORT/RE-CERT
Patient Name: Dee Herrera  : 1990    MRN: [id number]                              Today's Date: 2023       Admit Date: 10/26/2023    Visit Dx:     ICD-10-CM ICD-9-CM   1. Shortness of breath  R06.02 786.05   2. ESRD (end stage renal disease) on dialysis  N18.6 585.6    Z99.2 V45.11   3. Hyponatremia  E87.1 276.1   4. Generalized abdominal pain  R10.84 789.07   5. Elevated lactic acid level  R79.89 276.2   6. Elevated troponin  R79.89 790.6   7. Severe aortic valve regurgitation  I35.1 424.1   8. Pericardial effusion  I31.39 423.9   9. S/P AVR  Z95.2 V43.3   10. 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 syndrome    Systemic lupus erythematosus    Lupus nephritis, ISN/RPS class IV    Hypokalemia    Hypocalcemia    COVID-19    Hospital discharge follow-up    Stage 5 chronic kidney disease    Cardiac cirrhosis    Pancreatitis    Duodenitis    Regional enteritis of small bowel    Pericardial effusion    End stage renal disease on dialysis    Hemodialysis status    Seizure disorder    Elevated liver function tests    C. difficile colitis    Anemia, chronic disease    Essential hypertension    Peritoneal dialysis catheter in place    Anemia due to chronic kidney disease, on chronic dialysis    Alternating constipation and diarrhea    Abnormal stress test    Hyponatremia    Poor appetite    Moderate malnutrition    Chronic diastolic CHF (congestive heart failure)    Aortic valve lesion    Severe aortic valve regurgitation    Dissecting ascending aortic aneurysm    Recent cerebrovascular accident (CVA)     Past Medical  History:   Diagnosis Date    Anasarca     PER CT SCAN    Dry skin     ESRD (end stage renal disease) on dialysis     MARCO COLE, KATIE FRASER    History of abdominal pain     History of anemia     History of transfusion     Hypertension     Iron deficiency anemia 09/27/2021    Lupus (systemic lupus erythematosus) 07/30/2022    Migraine     Other specified nutritional anemias     Pericardial effusion     Renal insufficiency     Seizures     STATES LAST WAS 1/2023    Shortness of breath     OCCASIONAL    Vitamin D deficiency 09/27/2021     Past Surgical History:   Procedure Laterality Date    ASCENDING AORTIC ANEURYSM REPAIR W/ MECHANICAL AORTIC VALVE REPLACEMENT N/A 11/2/2023    Procedure: CHETNA STERNOTOMY, AORTIC ROOT REPLACEMENT WITH VALVE SPARING MISHEL PROCEDURE, REPLACEMENT OF ASCENDING AORTA, RIGHT FEMORAL DIALYSIS CATHETER PLACEMENT AND PRP;  Surgeon: Chris Medina MD;  Location: [facility] CVOR;  Service: Cardiothoracic;  Laterality: N/A;    CARDIAC CATHETERIZATION N/A 06/14/2023    Procedure: Coronary angiography;  Surgeon: Juana Taylor MD;  Location: [facility] CATH INVASIVE LOCATION;  Service: Cardiovascular;  Laterality: N/A;    CARDIAC CATHETERIZATION N/A 06/14/2023    Procedure: Left heart cath;  Surgeon: Juana Taylor MD;  Location: [facility] CATH INVASIVE LOCATION;  Service: Cardiovascular;  Laterality: N/A;    CARDIAC CATHETERIZATION N/A 06/14/2023    Procedure: Right Heart Cath;  Surgeon: Juana Taylor MD;  Location: [facility] CATH INVASIVE LOCATION;  Service: Cardiovascular;  Laterality: N/A;    COLONOSCOPY N/A 7/20/2023    Procedure: COLONOSCOPY to cecum with biopsy;  Surgeon: Drew Kaminski MD;  Location: [facility] ENDOSCOPY;  Service: Gastroenterology;  Laterality: N/A;  PRE - diarrhea, constipation  POST - fair prep, normal    CORONARY ARTERY BYPASS GRAFT N/A 11/6/2023    Procedure: STERNAL EXPLORATION AND WASH OUT;  Surgeon: Jr Mitesh Quiroz MD;  Location: [facility] CVOR;  Service:  Cardiothoracic;  Laterality: N/A;    ENDOSCOPY N/A 7/20/2023    Procedure: ESOPHAGOGASTRODUODENOSCOPY with biopsy;  Surgeon: Drew Kaminski MD;  Location: [facility] ENDOSCOPY;  Service: Gastroenterology;  Laterality: N/A;  PRE - abn ct abd  POST - gastritis    INSERTION HEMODIALYSIS CATHETER N/A 07/26/2022    Procedure: RIGHT TUNNELED DIALYSIS CATHETER PLACEMENT;  Surgeon: Diandra Adhikari MD;  Location: [facility] MAIN OR;  Service: Vascular;  Laterality: N/A;    INSERTION PERITONEAL DIALYSIS CATHETER N/A 04/03/2023    Procedure: INSERTION PERITONEAL DIALYSIS CATHETER LAPAROSCOPIC, omentumpexy;  Surgeon: Jemal Loyola MD;  Location: [facility] MAIN OR;  Service: General;  Laterality: N/A;    TONSILLECTOMY        General Information       Row Name 11/28/23 1147          OT Time and Intention    Document Type therapy note (daily note);progress note/recertification  -     Mode of Treatment occupational therapy;individual therapy  -       Row Name 11/28/23 1147          General Information    Patient Profile Reviewed yes  -SM     Existing Precautions/Restrictions fall;sternal;cardiac  -       Row Name 11/28/23 1147          Cognition    Orientation Status (Cognition) oriented x 3  -       Row Name 11/28/23 1147          Safety Issues, Functional Mobility    Safety Issues Affecting Function (Mobility) --  flat affect, slow to respond  -     Impairments Affecting Function (Mobility) balance;endurance/activity tolerance;strength;motor control;coordination  -               User Key  (r) = Recorded By, (t) = Taken By, (c) = Cosigned By      Initials Name Provider Type    SM Etta Bustillos OT Occupational Therapist                     Mobility/ADL's       Row Name 11/28/23 1332          Bed Mobility    Sit-Supine [place] (Bed Mobility) contact guard  -       Row Name 11/28/23 1332          Transfers    Transfers toilet transfer  -       Row Name 11/28/23 1332          Sit-Stand Transfer     Sit-Stand [place] (Transfers) contact guard;minimum assist (75% patient effort)  -     Assistive Device (Sit-Stand Transfers) walker, front-wheeled  -[facility] Name 11/28/23 1332          Toilet Transfer    [place] Level (Toilet Transfer) contact guard;verbal cues  -     Assistive Device (Toilet Transfer) grab bars/safety frame;walker, front-wheeled  -     Comment, (Toilet Transfer) extra time required, heavy use of grab bar  -SM       Row Name 11/28/23 1332          Functional Mobility    Functional Mobility- Ind. Level contact guard assist  -     Functional Mobility- Device walker, front-wheeled  -     Functional Mobility-Distance (Feet) --  30  -SM       Row Name 11/28/23 1332          Activities of Daily Living    BADL Assessment/Intervention lower body dressing  -SM       Row Name 11/28/23 1332          Self-Feeding Assessment/Training    [place] Level (Feeding) feeding skills;set up  -     Position (Self-Feeding) supported sitting  -SM       Row Name 11/28/23 1332          Lower Body Dressing Assessment/Training    [place] Level (Lower Body Dressing) doff;don;socks;standby assist  -     Position (Lower Body Dressing) supported sitting  -               User Key  (r) = Recorded By, (t) = Taken By, (c) = Cosigned By      Initials Name Provider Type    Etta Maya OT Occupational Therapist                   Obj/Interventions       [facility] Name 11/28/23 1333          Sensory Assessment (Somatosensory)    Sensory Assessment (Somatosensory) sensation intact  -SM       Row Name 11/28/23 1333          Vision Assessment/Intervention    Visual Impairment/Limitations WFL  -SM       Row Name 11/28/23 1333          Range of Motion Comprehensive    Comment, General Range of Motion shoulder flex impaired 25% actively 2/2 weakness, elbow to digit AROM WFL  -[facility] Name 11/28/23 1333          Strength Comprehensive (MMT)    Comment, General Manual Muscle Testing (MMT)  Assessment shoulder flex 3-/5, shoulder abd 3-/5, elbow flex 3+/5, elbow ext 3+/5,  strength 3+/5  -[facility] Name 11/28/23 1333          Shoulder (Therapeutic Exercise)    Shoulder AROM (Therapeutic Exercise) bilateral;flexion;extension;5 repetitions;2 sets;sitting  forward press  -[facility] Name 11/28/23 1333          Elbow/Forearm (Therapeutic Exercise)    Elbow/Forearm AROM (Therapeutic Exercise) bilateral;flexion;extension;10 repetitions;sitting  -[facility] Name 11/28/23 1333          Motor Skills    Motor Skills functional endurance  -     Functional Endurance poor +, multiple rest breaks, pace is very slow  -[facility] Name 11/28/23 1333          Balance    Static Sitting Balance standby assist  -     Position, Sitting Balance sitting edge of bed  -     Static Standing Balance contact guard  -     Dynamic Standing Balance contact guard  -     Position/Device Used, Standing Balance supported;walker, rolling  -               User Key  (r) = Recorded By, (t) = Taken By, (c) = Cosigned By      Initials Name Provider Type     Etta Bustillos OT Occupational Therapist                   Goals/Plan       [facility] Name 11/28/23 1338          Bed Mobility Goal 1 (OT)    Activity/Assistive Device (Bed Mobility Goal 1, OT) bed mobility activities, all  -SM     [place] Level/Cues Needed (Bed Mobility Goal 1, OT) standby assist  -SM     Time Frame (Bed Mobility Goal 1, OT) short term goal (STG);2 weeks  -SM     Strategies/Barriers (Bed Mobility Goal 1, OT) goal upgraded  -     Progress/Outcomes (Bed Mobility Goal 1, OT) goal revised this date;goal met  -[facility] Name 11/28/23 1338          Transfer Goal 1 (OT)    Activity/Assistive Device (Transfer Goal 1, OT) toilet;shower chair  -     [place] Level/Cues Needed (Transfer Goal 1, OT) standby assist  -SM     Time Frame (Transfer Goal 1, OT) short term goal (STG);2 weeks  -SM     Strategies/Barriers (Transfers Goal 1, OT) goal  upgraded  -SM     Progress/Outcome (Transfer Goal 1, OT) goal met;goal revised this date  -       Row Name 11/28/23 1338          Dressing Goal 1 (OT)    Activity/Device (Dressing Goal 1, OT) dressing skills, all  -SM     [place]/Cues Needed (Dressing Goal 1, OT) standby assist  -SM     Time Frame (Dressing Goal 1, OT) short term goal (STG);2 weeks  -SM     Strategies/Barriers (Dressing Goal 1, OT) goal upgraded  -SM     Progress/Outcome (Dressing Goal 1, OT) goal met;goal revised this date  -       Row Name 11/28/23 1338          Toileting Goal 1 (OT)    Activity/Device (Toileting Goal 1, OT) toileting skills, all  -SM     [place] Level/Cues Needed (Toileting Goal 1, OT) standby assist  -SM     Time Frame (Toileting Goal 1, OT) short term goal (STG);2 weeks  -SM     Strategies/Barriers (Toileting Goal 1, OT) goal upgraded  -SM     Progress/Outcome (Toileting Goal 1, OT) goal met;goal revised this date  -       Row Name 11/28/23 1338          Grooming Goal 1 (OT)    Progress/Outcome (Grooming Goal 1, OT) goal met  -       Row Name 11/28/23 1338          Self-Feeding Goal 1 (OT)    Progress/Outcomes (Self-Feeding Goal 1, OT) goal met  -       Row Name 11/28/23 1338          Therapy Assessment/Plan (OT)    Planned Therapy Interventions (OT) activity tolerance training;adaptive equipment training;BADL retraining;functional balance retraining;occupation/activity based interventions;patient/caregiver education/training;transfer/mobility retraining;strengthening exercise;ROM/therapeutic exercise  -               User Key  (r) = Recorded By, (t) = Taken By, (c) = Cosigned By      Initials Name Provider Type    SM Etta Bustillos OT Occupational Therapist                   Clinical Impression       Row Name 11/28/23 9965          Pain Assessment    Pain Location - buttock  -     Pain Intervention(s) Repositioned  -     Additional Documentation Pain Scale: FACES Pre/Post-Treatment (Group)  -   "     Row Name 11/28/23 5069          Pain Scale: FACES Pre/Post-Treatment    Pain: FACES Scale, Pretreatment 2-->hurts little bit  -SM     Posttreatment Pain Rating 2-->hurts little bit  -       Row Name 11/28/23 3431          Plan of Care Review    Plan of Care Reviewed With patient;sibling  -SM     Progress improving  -     Outcome Evaluation OT re-eval completed today due to long length of stay. Pt with complex hospital stay but the past few sessions she has been participating well with OT and is able to progress activity. She reports she is feeding self now \"mostly\" as well as completing grooming tasks this morning. She was able to don/doff socks SBA and transfer to the toilet CGA with rwx. She remains generally very weak with UE MMT and is very deconditioned. She would benefit from ongoing OT but is making progress towards all goals. With improvement in activity tolerance she may benefit from acute rehab at [place].  -       Row Name 11/28/23 4849          Therapy Assessment/Plan (OT)    Rehab Potential (OT) good, to achieve stated therapy goals  -     Criteria for Skilled Therapeutic Interventions Met (OT) yes;skilled treatment is necessary  -     Therapy Frequency (OT) 5 times/wk  -       Row Name 11/28/23 8841          Therapy Plan Review/Discharge Plan (OT)    Anticipated Discharge Disposition (OT) inpatient rehabilitation facility;skilled nursing facility  -       Row Name 11/28/23 1543          Positioning and Restraints    Pre-Treatment Position sitting in chair/recliner  -     Post Treatment Position bed  -     In Bed fowlers;call light within reach;encouraged to call for assist;notified nsg  nsg aware bed will not set alarm  -               User Key  (r) = Recorded By, (t) = Taken By, (c) = Cosigned By      Initials Name Provider Type    Etta Maya, OT Occupational Therapist                   Outcome Measures       Row Name 11/28/23 2362          How much help from another is " currently needed...    Putting on and taking off regular lower body clothing? 3  -SM     Bathing (including washing, rinsing, and drying) 2  -SM     Toileting (which includes using toilet bed pan or urinal) 3  -SM     Putting on and taking off regular upper body clothing 3  -SM     Taking care of personal grooming (such as brushing teeth) 3  -SM     Eating meals 3  -SM     AM-PAC 6 Clicks Score (OT) 17  -SM       Row Name 11/28/23 1339          Functional Assessment    Outcome Measure Options AM-PAC 6 Clicks Daily Activity (OT)  -               User Key  (r) = Recorded By, (t) = Taken By, (c) = Cosigned By      Initials Name Provider Type    Etta Maya OT Occupational Therapist                    Occupational Therapy Education       Title: PT OT SLP Therapies (In Progress)       Topic: Occupational Therapy (In Progress)       Point: ADL training (Done)       Description:   Instruct learner(s) on proper safety adaptation and remediation techniques during self care or transfers.   Instruct in proper use of assistive devices.                  Learning Progress Summary             Patient Acceptance, E, VU by  at 11/28/2023 1340    Comment: progress towards goals, OT POC, AROM HEP of UEs to increase endurance/strength   Family Acceptance, E, VU by  at 11/28/2023 1340    Comment: progress towards goals, OT POC, AROM HEP of UEs to increase endurance/strength                         Point: Home exercise program (Not Started)       Description:   Instruct learner(s) on appropriate technique for monitoring, assisting and/or progressing therapeutic exercises/activities.                  Learner Progress:  Not documented in this visit.              Point: Precautions (Not Started)       Description:   Instruct learner(s) on prescribed precautions during self-care and functional transfers.                  Learner Progress:  Not documented in this visit.              Point: Body mechanics (Not Started)        "Description:   Instruct learner(s) on proper positioning and spine alignment during self-care, functional mobility activities and/or exercises.                  Learner Progress:  Not documented in this visit.                              User Key       Initials Effective Dates Name Provider Type Discipline     04/02/20 -  Etta Bustillos OT Occupational Therapist OT                  OT Recommendation and Plan  Planned Therapy Interventions (OT): activity tolerance training, adaptive equipment training, BADL retraining, functional balance retraining, occupation/activity based interventions, patient/caregiver education/training, transfer/mobility retraining, strengthening exercise, ROM/therapeutic exercise  Therapy Frequency (OT): 5 times/wk  Plan of Care Review  Plan of Care Reviewed With: patient, sibling  Progress: improving  Outcome Evaluation: OT re-eval completed today due to long length of stay. Pt with complex hospital stay but the past few sessions she has been participating well with OT and is able to progress activity. She reports she is feeding self now \"mostly\" as well as completing grooming tasks this morning. She was able to don/doff socks SBA and transfer to the toilet CGA with rwx. She remains generally very weak with UE MMT and is very deconditioned. She would benefit from ongoing OT but is making progress towards all goals. With improvement in activity tolerance she may benefit from acute rehab at [place].     Time Calculation:         Time Calculation- OT       Row Name 11/28/23 1340             Time Calculation- OT    OT Start Time 1135  -      OT Stop Time 1200  -      OT Time Calculation (min) 25 min  -      OT Received On 11/28/23  -      OT - Next Appointment 11/29/23  -      OT Goal Re-Cert Due Date 12/12/23  -         Timed Charges    91306 - OT Therapeutic Exercise Minutes 12  -      32022 - OT Self Care/Mgmt Minutes 13  -         Total Minutes    Timed Charges Total " Minutes 25  -SM       Total Minutes 25  -SM                User Key  (r) = Recorded By, (t) = Taken By, (c) = Cosigned By      Initials Name Provider Type    Etta Maya OT Occupational Therapist                  Therapy Charges for Today       Code Description Service Date Service Provider Modifiers Qty    55088407226 HC OT THER PROC EA 15 MIN 11/27/2023 Etta Bustillos OT GO 1    14265312695 HC OT THERAPEUTIC ACT EA 15 MIN 11/27/2023 Etta Bustillos OT GO 1    55423840425 HC OT THER PROC EA 15 MIN 11/28/2023 Etta Bustillos OT GO 1    84202205596  OT SELF CARE/MGMT/TRAIN EA 15 MIN 11/28/2023 Etta Bustillos OT GO 1                 Etta Bustillos OT  11/28/2023

## 2023-11-28 NOTE — PLAN OF CARE
Goal Outcome Evaluation:      Pt seen for diet upgrade - no issues noted with mastication, upgrade to regular diet/thin liquids, meds whole w/thin liquids, upright for all PO, alternate liquids/solids.  No further ST needed for dysphagia at this time.  MANUELITO pending, however, minimal concern for cognitive deficits vs depression and impact on active engagement in environment.  Psychiatry is following and reporting overall depression improving.  ST to discharge at this time, please reconsult, if needed.  RN, Shea in agreement with plan as discussed.

## 2023-11-28 NOTE — PROGRESS NOTES
"[facility] Cardiology [facility] Follow Up    Chief Complaint: follow up aortic aneurysm, aortic valve regurgitation    Interval History: Reports some pain on her left lateral abdomen and back.  No trouble breathing.  She is sitting up and attempting to eat some breakfast this morning.    Objective:     Objective:  Temp:  [98.2 °F (36.8 °C)-99.5 °F (37.5 °C)] 98.7 °F (37.1 °C)  Heart Rate:  [84-87] 84  Resp:  [16-18] 16  BP: (106-129)/() 125/96     Intake/Output Summary (Last 24 hours) at 11/28/2023 0837  Last data filed at 11/28/2023 0701  Gross per 24 hour   Intake 75880 ml   Output 9000 ml   Net 1896 ml     Body mass index is 17.16 kg/m².      11/25/23  0316 11/26/23  0527 11/27/23  0635   Weight: 46.9 kg (103 lb 4.8 oz) 46.9 kg (103 lb 5.3 oz) 46.7 kg (103 lb)     Weight change:       Physical Exam:   General : Alert, cooperative, in no acute distress.  Neuro: Alert,cooperative and oriented.  Lungs: CTAB. Normal respiratory effort and rate.  CV: Regular rate and rhythm, normal S1 and S2, no murmurs, gallops or rubs.  ABD: Soft, nontender, nondistended. Positive bowel sounds.  Extr: No edema or cyanosis, moves all extremities.    Lab Review:   Results from last 7 days   Lab Units 11/28/23  0526 11/27/23  0704   SODIUM mmol/L 125* 123*   POTASSIUM mmol/L 3.7 4.4   CHLORIDE mmol/L 85* 84*   CO2 mmol/L 27.0 25.3   BUN mg/dL 55* 54*   CREATININE mg/dL 4.97* 5.15*   GLUCOSE mg/dL 104* 79   CALCIUM mg/dL 8.9 9.1     Results from last 7 days   Lab Units 11/22/23  0550 11/21/23  2114 11/21/23  1718   HSTROP T ng/L 438* 417* 407*     Results from last 7 days   Lab Units 11/28/23  0526 11/27/23  0704   WBC 10*3/mm3 7.53 7.31   HEMOGLOBIN g/dL 9.4* 9.6*   HEMATOCRIT % 29.9* 30.2*   PLATELETS 10*3/mm3 201 203         Results from last 7 days   Lab Units 11/28/23  0526 11/24/23  0556   MAGNESIUM mg/dL 1.4* 1.7           Invalid input(s): \"LDLCALC\"          I reviewed the patient's new clinical results.  I personally " viewed and interpreted the patient's EKG  Current Medications:   Scheduled Meds:aspirin, 81 mg, Nasogastric, Daily  carvedilol, 25 mg, Nasogastric, Q12H  chlorhexidine, 15 mL, Mouth/Throat, Q12H  Delflex-LC/4.25% Dextrose (DIANEAL) 2,000 mL with heparin (porcine) 5000 UNIT/ML 1,500 Units dialysis solution, , Intraperitoneal, Q5H  escitalopram, 10 mg, Nasogastric, Daily  First Mouthwash (Magic Mouthwash), 10 mL, Swish & Spit, Q6H  hydroxychloroquine, 200 mg, Oral, Daily  insulin regular, 2-7 Units, Subcutaneous, Q6H  Lacosamide, 100 mg, Intravenous, Q12H  lactulose, 10 g, Nasogastric, TID  lidocaine, 10 mL, Subcutaneous, Once  metoclopramide, 5 mg, Intravenous, Q6H  midodrine, 2.5 mg, Oral, TID AC  mupirocin, , Each Nare, BID  mycophenolate, 250 mg, Oral, Q12H  pantoprazole, 40 mg, Intravenous, Q AM      Continuous Infusions:hold, 1 each        Allergies:  Allergies   Allergen Reactions    Minoxidil Hives and Other (See Comments)     Pericardial effusion .       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  Status post aneurysm repair and replacement on 11/2.   Severe aortic regurgitation.  Due to #1.  Status post repair on 11/2.   Cardiac tamponade: secondary to pericardial thrombus anteriorly and compressing right ventricle.  Requiring reexploration with clot removal and treatment of a small amount of bleeding at the suture line on 11/6.  Seizures.  Started postoperatively.  Svetlana on antiepileptics.  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  On peritoneal dialysis at home.  On hemodialysis postoperatively and then switch back to peritoneal dialysis after 11/13.  She has not been doing as well on peritoneal dialysis and there have been discussions to resume hemodialysis until she recovers more fully.    Hyponatremia.  Persistent.  Difficult to manage on peritoneal dialysis.  Hypertension.  Initially with issues with hypertension.  More recently developed issues with  hypotension now on midodrine.    Chronic anemia. stable   Systemic lupus erythematosus.  Previously on hydroxychloroquine and mycophenolate.  Both on hold perioperatively but now resumed on hydroxychloroquine.  Depression.she continues to deny depression and.  However she appears to be improving following the initiation of escitalopram.    Right MCA CVA. confirmed by MRI. Neurology expects full recovery.    -Patient has been resistant to resuming hemodialysis.  Nephrology feels this is the best option in order to help with the patient recover fully.  She is also severely limited by rehab options while on peritoneal dialysis.  Discussed with the patient at bedside with Dr. Diaz.  Following our discussion she appears agreeable to the idea of resuming hemodialysis this morning.  -Continue carvedilol and midodrine for now.  Hopefully we can discontinue midodrine in the near future.    Juana Taylor MD  11/28/23  08:37 EST

## 2023-11-28 NOTE — PROGRESS NOTES
Name: Dee Herrera ADMIT: 10/26/2023   : 1990  PCP: Margarita Woods APRN    MRN: [id number] LOS: 33 days   AGE/SEX: 33 y.o. female  ROOM: [facility]     Subjective   Subjective   Patient is seen at bedside, no new complaints.       Objective   Objective   Vital Signs  Temp:  [98.2 °F (36.8 °C)-98.7 °F (37.1 °C)] 98.5 °F (36.9 °C)  Heart Rate:  [84-87] 84  Resp:  [16] 16  BP: ()/() 92/61  SpO2:  [99 %-100 %] 99 %  on   ;   Device (Oxygen Therapy): room air  Body mass index is 17.16 kg/m².  Physical Exam  General, awake and alert.  Ill-appearing female  Head and ENT, normocephalic and atraumatic.  Lungs, symmetric expansion, equal air entry bilaterally.  Heart, regular rate and rhythm.  Abdomen, soft and nontender.  Extremities, no clubbing or cyanosis.  Neuro, no focal deficits.  Skin: Warm and no rash.  Psych, normal mood and affect.  Musculoskeletal, joint examination is grossly normal.     Copied text material from yesterday's note has been reviewed for appropriate changes and remains accurate as of 23.         Results Review     I reviewed the patient's new clinical results.  Results from last 7 days   Lab Units 23  0723  0552   WBC 10*3/mm3 7.53 7.31 7.92 8.69   HEMOGLOBIN g/dL 9.4* 9.6* 10.2* 9.4*   PLATELETS 10*3/mm3 201 203 212 182     Results from last 7 days   Lab Units 23  0523  0704 23  0623  0552   SODIUM mmol/L 125* 123* 127* 126*   POTASSIUM mmol/L 3.7 4.4 3.2* 3.5   CHLORIDE mmol/L 85* 84* 85* 86*   CO2 mmol/L 27.0 25.3 29.0 28.5   BUN mg/dL 55* 54* 56* 48*   CREATININE mg/dL 4.97* 5.15* 4.58* 4.78*   GLUCOSE mg/dL 104* 79 112* 106*   EGFR mL/min/1.73 11.3* 10.8* 12.5* 11.8*     Results from last 7 days   Lab Units 23  0526 23  0704 23  0612 23  0552   ALBUMIN g/dL 2.3* 1.9* 2.5* 2.0*     Results from last 7 days   Lab Units 23  0526 23  0704 23  0612  11/25/23  0552 11/24/23  0556 11/23/23  0428 11/22/23  0550 11/21/23  1718   CALCIUM mg/dL 8.9 9.1 9.2 8.7 8.8   < > 8.8  --    ALBUMIN g/dL 2.3* 1.9* 2.5* 2.0* 2.1*   < > 2.4*  --    MAGNESIUM mg/dL 1.4*  --   --   --  1.7  --  2.2 1.5*   PHOSPHORUS mg/dL 4.2 4.1 2.1* 2.7 2.6   < > 3.7  --     < > = values in this interval not displayed.       Glucose   Date/Time Value Ref Range Status   11/28/2023 1112 207 (H) 70 - 130 mg/dL Final   11/28/2023 0617 91 70 - 130 mg/dL Final   11/28/2023 0000 166 (H) 70 - 130 mg/dL Final   11/27/2023 1749 182 (H) 70 - 130 mg/dL Final   11/27/2023 1212 184 (H) 70 - 130 mg/dL Final   11/27/2023 0630 82 70 - 130 mg/dL Final   11/27/2023 0012 111 70 - 130 mg/dL Final       No radiology results for the last day    I have personally reviewed all medications:  Scheduled Medications  aspirin, 81 mg, Nasogastric, Daily  carvedilol, 25 mg, Nasogastric, Q12H  [START ON 11/29/2023] ceFAZolin, 2,000 mg, Intravenous, Once  chlorhexidine, 15 mL, Mouth/Throat, Q12H  Delflex-LC/4.25% Dextrose (DIANEAL) 2,000 mL with heparin (porcine) 5000 UNIT/ML 1,500 Units dialysis solution, , Intraperitoneal, Q5H  escitalopram, 10 mg, Nasogastric, Daily  First Mouthwash (Magic Mouthwash), 10 mL, Swish & Spit, Q6H  hydroxychloroquine, 200 mg, Oral, Daily  insulin regular, 2-7 Units, Subcutaneous, Q6H  Lacosamide, 100 mg, Intravenous, Q12H  lactulose, 10 g, Nasogastric, TID  lidocaine, 10 mL, Subcutaneous, Once  metoclopramide, 5 mg, Intravenous, Q6H  midodrine, 2.5 mg, Oral, TID AC  mupirocin, , Each Nare, BID  mycophenolate, 250 mg, Oral, Q12H  pantoprazole, 40 mg, Intravenous, Q AM    Infusions  hold, 1 each    Diet  Diet: Regular/House Diet, Fluid Restriction (240 mL/tray) Diets; 1000 mL/day; Feeding Assistance - Nursing; Texture: Regular Texture (IDDSI 7); Fluid Consistency: Thin (IDDSI 0)  NPO Diet NPO Type: Strict NPO    I have personally reviewed:  [x]  Laboratory   [x]  Microbiology   [x]  Radiology   [x]   EKG/Telemetry  [x]  Cardiology/Vascular   []  Pathology    []  Records       Assessment/Plan     Active Hospital Problems    Diagnosis  POA    **Dissecting ascending aortic aneurysm [I71.010]  Yes    Recent cerebrovascular accident (CVA) [Z86.73]  Yes    Aortic valve lesion [I35.8]  Yes    Severe aortic valve regurgitation [I35.1]  Yes    Hyponatremia [E87.1]  Yes    Poor appetite [R63.0]  Yes    Moderate malnutrition [E44.0]  Yes    Chronic diastolic CHF (congestive heart failure) [I50.32]  Yes    Anemia due to chronic kidney disease, on chronic dialysis [N18.6, D63.1, Z99.2]  Not Applicable    Peritoneal dialysis catheter in place [Z99.2]  Not Applicable    Essential hypertension [I10]  Yes    End stage renal disease on dialysis [N18.6, Z99.2]  Not Applicable    Seizure disorder [G40.909]  Yes    Elevated liver function tests [R79.89]  Yes    Pericardial effusion [I31.39]  Yes    Systemic lupus erythematosus [M32.9]  Yes    Thrombocytopenia [D69.6]  Yes    Hypertension secondary to other renal disorders [I15.1]  Yes    Pancytopenia [D61.818]  Yes    Vitamin D deficiency [E55.9]  Yes      Resolved Hospital Problems    Diagnosis Date Resolved POA    Abdominal pain [R10.9] 10/28/2023 Yes       33 y.o. female admitted with Dissecting ascending aortic aneurysm.    Assessment and plan  1.  End-stage renal disease, dependent on peritoneal dialysis, plan is now to switch to hemodialysis per nephrology, vascular surgery has also been consulted.    2.  Acute CVA, seizures, status post neurology evaluation, continue current medications.    3.  Severe protein calorie malnutrition, continue nutrition.    4.  History of SLE, chronic condition.    5.  Hyponatremia, monitor sodium trend.    6.  CODE STATUS is full code.  Further plans based on hospital course.      Torres Gay MD  [facility]  11/28/23  16:52 EST

## 2023-11-28 NOTE — PROGRESS NOTES
Continued Stay Note  [facility]     Patient Name: Dee Herrera  MRN: [id number]  Today's Date: 11/28/2023    Admit Date: 10/26/2023    Plan: tbd   Discharge Plan       Row Name 11/28/23 1413       Plan    Plan tbd    Plan Comments Noted patient is switching to HD. Follow up with patient and patient's mother at the bedside. Plan remains for patient to go to short-term rehab after dc. [facility] rehab following. Patient and mom are agreeable with referrals to SNFs with onsite HD. Epic referrals sent to [facility], [facility], [facility], [facility], [facility] and [facility]. Once patient returns home she would like to have OP HD in the [facility] area. Referral faxed to [facility]. CCP will follow up.                   Discharge Codes    No documentation.                 Expected Discharge Date and Time       Expected Discharge Date Expected Discharge Time    Nov 30, 2023               Elsi Reynolds RN

## 2023-11-28 NOTE — PROGRESS NOTES
Nutrition Services    Patient Name:  Dee Herrera  YOB: 1990  MRN: [id number]  Admit Date:  10/26/2023    Assessment Date:  11/28/23    CLINICAL NUTRITION - PROGRESS NOTE       Reason for Encounter Follow-up         Nutrition Order Diet: Regular/House Diet, Fluid Restriction (240 mL/tray) Diets; 1000 mL/day; Feeding Assistance - Nursing; Texture: Regular Texture (IDDSI 7); Fluid Consistency: Thin (IDDSI 0)  NPO Diet NPO Type: Strict NPO    Supplements/Snacks Boost Breeze TID, Boost Plus BID    EN/PN Order N/a         Pertinent Information Visited patient at bedside and discussed cortrak placement again.  Patient does not wish for this to be placed.  She is in bed eating a [employer] sandwich and chips.  Discussed with RN.        Intervention/Plan Plans for TDC insertion tomorrow.  RD to continue to follow.     RD to follow up per protocol.    Electronically signed by:  Shell Horn RD  11/28/23 13:02 EST

## 2023-11-28 NOTE — PROGRESS NOTES
Access Center follow up d/t depression; this writer reviewed chart and spoke with SULMA Light. Per RN, patient was awake all night with frequent nursing requests; she continues on Lexapro.  SNF placement recommended per OT; no further needs/concerns noted at this time per RN and/or medical team. Access Center to continue following.

## 2023-11-28 NOTE — PLAN OF CARE
Goal Outcome Evaluation:  Plan of Care Reviewed With: patient        Progress: no change  Outcome Evaluation: PD exchanges completed this shift with large output. Output light lynette in color. Complaints of pain treated with PRN tylenol. Abdominal cramping overnight. very flat, seeks out staff frequently. Refusing many meds. Insulin given per mar. possibly new cortrack placement today.

## 2023-11-29 ENCOUNTER — ANESTHESIA EVENT (OUTPATIENT)
Dept: PERIOP | Facility: HOSPITAL | Age: 33
End: 2023-11-29
Payer: MEDICARE

## 2023-11-29 ENCOUNTER — APPOINTMENT (OUTPATIENT)
Dept: GENERAL RADIOLOGY | Facility: HOSPITAL | Age: 33
DRG: 219 | End: 2023-11-29
Payer: MEDICARE

## 2023-11-29 ENCOUNTER — ANESTHESIA (OUTPATIENT)
Dept: PERIOP | Facility: HOSPITAL | Age: 33
End: 2023-11-29
Payer: MEDICARE

## 2023-11-29 LAB
ALBUMIN SERPL-MCNC: 2.1 G/DL (ref 3.5–5.2)
ANION GAP SERPL CALCULATED.3IONS-SCNC: 13.7 MMOL/L (ref 5–15)
BASOPHILS # BLD AUTO: 0.03 10*3/MM3 (ref 0–0.2)
BASOPHILS NFR BLD AUTO: 0.4 % (ref 0–1.5)
BUN SERPL-MCNC: 50 MG/DL (ref 6–20)
BUN/CREAT SERPL: 9.1 (ref 7–25)
CALCIUM SPEC-SCNC: 8.9 MG/DL (ref 8.6–10.5)
CHLORIDE SERPL-SCNC: 86 MMOL/L (ref 98–107)
CO2 SERPL-SCNC: 24.3 MMOL/L (ref 22–29)
CREAT SERPL-MCNC: 5.49 MG/DL (ref 0.57–1)
DEPRECATED RDW RBC AUTO: 42.9 FL (ref 37–54)
EGFRCR SERPLBLD CKD-EPI 2021: 10 ML/MIN/1.73
EOSINOPHIL # BLD AUTO: 0.24 10*3/MM3 (ref 0–0.4)
EOSINOPHIL NFR BLD AUTO: 3.1 % (ref 0.3–6.2)
ERYTHROCYTE [DISTWIDTH] IN BLOOD BY AUTOMATED COUNT: 14.3 % (ref 12.3–15.4)
GLUCOSE BLDC GLUCOMTR-MCNC: 165 MG/DL (ref 70–130)
GLUCOSE BLDC GLUCOMTR-MCNC: 83 MG/DL (ref 70–130)
GLUCOSE BLDC GLUCOMTR-MCNC: 97 MG/DL (ref 70–130)
GLUCOSE SERPL-MCNC: 92 MG/DL (ref 65–99)
HCG SERPL QL: NEGATIVE
HCT VFR BLD AUTO: 28.9 % (ref 34–46.6)
HGB BLD-MCNC: 9.5 G/DL (ref 12–15.9)
IMM GRANULOCYTES # BLD AUTO: 0.1 10*3/MM3 (ref 0–0.05)
IMM GRANULOCYTES NFR BLD AUTO: 1.3 % (ref 0–0.5)
LYMPHOCYTES # BLD AUTO: 0.6 10*3/MM3 (ref 0.7–3.1)
LYMPHOCYTES NFR BLD AUTO: 7.8 % (ref 19.6–45.3)
MAGNESIUM SERPL-MCNC: 2.1 MG/DL (ref 1.6–2.6)
MCH RBC QN AUTO: 27.2 PG (ref 26.6–33)
MCHC RBC AUTO-ENTMCNC: 32.9 G/DL (ref 31.5–35.7)
MCV RBC AUTO: 82.8 FL (ref 79–97)
MONOCYTES # BLD AUTO: 1.17 10*3/MM3 (ref 0.1–0.9)
MONOCYTES NFR BLD AUTO: 15.2 % (ref 5–12)
NEUTROPHILS NFR BLD AUTO: 5.56 10*3/MM3 (ref 1.7–7)
NEUTROPHILS NFR BLD AUTO: 72.2 % (ref 42.7–76)
NRBC BLD AUTO-RTO: 0 /100 WBC (ref 0–0.2)
PHOSPHATE SERPL-MCNC: 3.9 MG/DL (ref 2.5–4.5)
PLATELET # BLD AUTO: 225 10*3/MM3 (ref 140–450)
PMV BLD AUTO: 11.4 FL (ref 6–12)
POTASSIUM SERPL-SCNC: 4.1 MMOL/L (ref 3.5–5.2)
RBC # BLD AUTO: 3.49 10*6/MM3 (ref 3.77–5.28)
SODIUM SERPL-SCNC: 124 MMOL/L (ref 136–145)
WBC NRBC COR # BLD AUTO: 7.7 10*3/MM3 (ref 3.4–10.8)

## 2023-11-29 PROCEDURE — 84703 CHORIONIC GONADOTROPIN ASSAY: CPT | Performed by: STUDENT IN AN ORGANIZED HEALTH CARE EDUCATION/TRAINING PROGRAM

## 2023-11-29 PROCEDURE — 25010000002 CEFAZOLIN IN DEXTROSE 2000 MG/ 100 ML SOLUTION: Performed by: STUDENT IN AN ORGANIZED HEALTH CARE EDUCATION/TRAINING PROGRAM

## 2023-11-29 PROCEDURE — 25010000002 METOCLOPRAMIDE PER 10 MG: Performed by: INTERNAL MEDICINE

## 2023-11-29 PROCEDURE — 99232 SBSQ HOSP IP/OBS MODERATE 35: CPT | Performed by: NURSE PRACTITIONER

## 2023-11-29 PROCEDURE — 25010000002 LACOSAMIDE 200 MG/20ML SOLUTION: Performed by: STUDENT IN AN ORGANIZED HEALTH CARE EDUCATION/TRAINING PROGRAM

## 2023-11-29 PROCEDURE — 76000 FLUOROSCOPY <1 HR PHYS/QHP: CPT

## 2023-11-29 PROCEDURE — C1750 CATH, HEMODIALYSIS,LONG-TERM: HCPCS | Performed by: STUDENT IN AN ORGANIZED HEALTH CARE EDUCATION/TRAINING PROGRAM

## 2023-11-29 PROCEDURE — 97535 SELF CARE MNGMENT TRAINING: CPT

## 2023-11-29 PROCEDURE — 02HV33Z INSERTION OF INFUSION DEVICE INTO SUPERIOR VENA CAVA, PERCUTANEOUS APPROACH: ICD-10-PCS | Performed by: STUDENT IN AN ORGANIZED HEALTH CARE EDUCATION/TRAINING PROGRAM

## 2023-11-29 PROCEDURE — 83735 ASSAY OF MAGNESIUM: CPT | Performed by: INTERNAL MEDICINE

## 2023-11-29 PROCEDURE — 63710000001 MYCOPHENOLATE MOFETIL PER 250 MG: Performed by: STUDENT IN AN ORGANIZED HEALTH CARE EDUCATION/TRAINING PROGRAM

## 2023-11-29 PROCEDURE — 0JH63XZ INSERTION OF TUNNELED VASCULAR ACCESS DEVICE INTO CHEST SUBCUTANEOUS TISSUE AND FASCIA, PERCUTANEOUS APPROACH: ICD-10-PCS | Performed by: STUDENT IN AN ORGANIZED HEALTH CARE EDUCATION/TRAINING PROGRAM

## 2023-11-29 PROCEDURE — 25010000002 HEPARIN (PORCINE) PER 1000 UNITS: Performed by: STUDENT IN AN ORGANIZED HEALTH CARE EDUCATION/TRAINING PROGRAM

## 2023-11-29 PROCEDURE — C9254 INJECTION, LACOSAMIDE: HCPCS | Performed by: NURSE PRACTITIONER

## 2023-11-29 PROCEDURE — 25010000002 PROPOFOL 500 MG/50ML EMULSION: Performed by: NURSE ANESTHETIST, CERTIFIED REGISTERED

## 2023-11-29 PROCEDURE — 80069 RENAL FUNCTION PANEL: CPT | Performed by: INTERNAL MEDICINE

## 2023-11-29 PROCEDURE — 85025 COMPLETE CBC W/AUTO DIFF WBC: CPT | Performed by: INTERNAL MEDICINE

## 2023-11-29 PROCEDURE — 25010000002 BUPIVACAINE (PF) 0.25 % SOLUTION 10 ML VIAL: Performed by: STUDENT IN AN ORGANIZED HEALTH CARE EDUCATION/TRAINING PROGRAM

## 2023-11-29 PROCEDURE — 25010000002 FENTANYL CITRATE (PF) 50 MCG/ML SOLUTION: Performed by: NURSE ANESTHETIST, CERTIFIED REGISTERED

## 2023-11-29 PROCEDURE — 82948 REAGENT STRIP/BLOOD GLUCOSE: CPT

## 2023-11-29 PROCEDURE — C9254 INJECTION, LACOSAMIDE: HCPCS | Performed by: STUDENT IN AN ORGANIZED HEALTH CARE EDUCATION/TRAINING PROGRAM

## 2023-11-29 PROCEDURE — 25010000002 LACOSAMIDE 200 MG/20ML SOLUTION: Performed by: NURSE PRACTITIONER

## 2023-11-29 PROCEDURE — C1894 INTRO/SHEATH, NON-LASER: HCPCS | Performed by: STUDENT IN AN ORGANIZED HEALTH CARE EDUCATION/TRAINING PROGRAM

## 2023-11-29 PROCEDURE — 25010000002 MIDAZOLAM PER 1 MG: Performed by: NURSE ANESTHETIST, CERTIFIED REGISTERED

## 2023-11-29 PROCEDURE — 25010000002 LIDOCAINE 1 % SOLUTION 20 ML VIAL: Performed by: STUDENT IN AN ORGANIZED HEALTH CARE EDUCATION/TRAINING PROGRAM

## 2023-11-29 RX ORDER — PROMETHAZINE HYDROCHLORIDE 12.5 MG/1
25 TABLET ORAL ONCE AS NEEDED
Status: DISCONTINUED | OUTPATIENT
Start: 2023-11-29 | End: 2023-12-09 | Stop reason: HOSPADM

## 2023-11-29 RX ORDER — HYDROMORPHONE HYDROCHLORIDE 1 MG/ML
0.25 INJECTION, SOLUTION INTRAMUSCULAR; INTRAVENOUS; SUBCUTANEOUS
Status: DISCONTINUED | OUTPATIENT
Start: 2023-11-29 | End: 2023-12-09 | Stop reason: HOSPADM

## 2023-11-29 RX ORDER — ONDANSETRON 2 MG/ML
4 INJECTION INTRAMUSCULAR; INTRAVENOUS ONCE AS NEEDED
Status: DISCONTINUED | OUTPATIENT
Start: 2023-11-29 | End: 2023-12-09 | Stop reason: HOSPADM

## 2023-11-29 RX ORDER — NALOXONE HCL 0.4 MG/ML
0.2 VIAL (ML) INJECTION AS NEEDED
Status: DISCONTINUED | OUTPATIENT
Start: 2023-11-29 | End: 2023-12-09 | Stop reason: HOSPADM

## 2023-11-29 RX ORDER — LABETALOL HYDROCHLORIDE 5 MG/ML
5 INJECTION, SOLUTION INTRAVENOUS
Status: DISCONTINUED | OUTPATIENT
Start: 2023-11-29 | End: 2023-12-09 | Stop reason: HOSPADM

## 2023-11-29 RX ORDER — MIDAZOLAM HYDROCHLORIDE 1 MG/ML
INJECTION INTRAMUSCULAR; INTRAVENOUS AS NEEDED
Status: DISCONTINUED | OUTPATIENT
Start: 2023-11-29 | End: 2023-11-29 | Stop reason: SURG

## 2023-11-29 RX ORDER — DROPERIDOL 2.5 MG/ML
0.62 INJECTION, SOLUTION INTRAMUSCULAR; INTRAVENOUS
Status: DISCONTINUED | OUTPATIENT
Start: 2023-11-29 | End: 2023-12-09 | Stop reason: HOSPADM

## 2023-11-29 RX ORDER — MIDAZOLAM HYDROCHLORIDE 1 MG/ML
1 INJECTION INTRAMUSCULAR; INTRAVENOUS
Status: DISCONTINUED | OUTPATIENT
Start: 2023-11-29 | End: 2023-11-29 | Stop reason: HOSPADM

## 2023-11-29 RX ORDER — MAGNESIUM HYDROXIDE 1200 MG/15ML
LIQUID ORAL AS NEEDED
Status: DISCONTINUED | OUTPATIENT
Start: 2023-11-29 | End: 2023-11-29 | Stop reason: HOSPADM

## 2023-11-29 RX ORDER — HYDROCODONE BITARTRATE AND ACETAMINOPHEN 5; 325 MG/1; MG/1
1 TABLET ORAL ONCE AS NEEDED
Status: COMPLETED | OUTPATIENT
Start: 2023-11-29 | End: 2023-12-01

## 2023-11-29 RX ORDER — MANNITOL 250 MG/ML
12.5 INJECTION, SOLUTION INTRAVENOUS AS NEEDED
Status: CANCELLED | OUTPATIENT
Start: 2023-11-29 | End: 2023-11-30

## 2023-11-29 RX ORDER — LIDOCAINE HYDROCHLORIDE 20 MG/ML
INJECTION, SOLUTION INFILTRATION; PERINEURAL AS NEEDED
Status: DISCONTINUED | OUTPATIENT
Start: 2023-11-29 | End: 2023-11-29 | Stop reason: SURG

## 2023-11-29 RX ORDER — HEPARIN SODIUM 1000 [USP'U]/ML
INJECTION, SOLUTION INTRAVENOUS; SUBCUTANEOUS AS NEEDED
Status: DISCONTINUED | OUTPATIENT
Start: 2023-11-29 | End: 2023-11-29 | Stop reason: HOSPADM

## 2023-11-29 RX ORDER — FENTANYL CITRATE 50 UG/ML
50 INJECTION, SOLUTION INTRAMUSCULAR; INTRAVENOUS ONCE AS NEEDED
Status: DISCONTINUED | OUTPATIENT
Start: 2023-11-29 | End: 2023-11-29 | Stop reason: HOSPADM

## 2023-11-29 RX ORDER — SODIUM CHLORIDE 0.9 % (FLUSH) 0.9 %
3-10 SYRINGE (ML) INJECTION AS NEEDED
Status: DISCONTINUED | OUTPATIENT
Start: 2023-11-29 | End: 2023-11-29 | Stop reason: HOSPADM

## 2023-11-29 RX ORDER — SODIUM CHLORIDE 9 MG/ML
75 INJECTION, SOLUTION INTRAVENOUS CONTINUOUS
Status: DISCONTINUED | OUTPATIENT
Start: 2023-11-29 | End: 2023-12-04

## 2023-11-29 RX ORDER — LIDOCAINE HYDROCHLORIDE 10 MG/ML
0.5 INJECTION, SOLUTION INFILTRATION; PERINEURAL ONCE AS NEEDED
Status: DISCONTINUED | OUTPATIENT
Start: 2023-11-29 | End: 2023-11-29 | Stop reason: HOSPADM

## 2023-11-29 RX ORDER — HYDROCODONE BITARTRATE AND ACETAMINOPHEN 7.5; 325 MG/1; MG/1
1 TABLET ORAL EVERY 4 HOURS PRN
Status: DISCONTINUED | OUTPATIENT
Start: 2023-11-29 | End: 2023-12-09 | Stop reason: HOSPADM

## 2023-11-29 RX ORDER — SODIUM CHLORIDE 0.9 % (FLUSH) 0.9 %
3 SYRINGE (ML) INJECTION EVERY 12 HOURS SCHEDULED
Status: DISCONTINUED | OUTPATIENT
Start: 2023-11-29 | End: 2023-11-29 | Stop reason: HOSPADM

## 2023-11-29 RX ORDER — FAMOTIDINE 10 MG/ML
20 INJECTION, SOLUTION INTRAVENOUS ONCE
Status: COMPLETED | OUTPATIENT
Start: 2023-11-29 | End: 2023-11-29

## 2023-11-29 RX ORDER — PROMETHAZINE HYDROCHLORIDE 25 MG/1
25 SUPPOSITORY RECTAL ONCE AS NEEDED
Status: DISCONTINUED | OUTPATIENT
Start: 2023-11-29 | End: 2023-12-09 | Stop reason: HOSPADM

## 2023-11-29 RX ORDER — DIPHENHYDRAMINE HYDROCHLORIDE 50 MG/ML
12.5 INJECTION INTRAMUSCULAR; INTRAVENOUS
Status: DISCONTINUED | OUTPATIENT
Start: 2023-11-29 | End: 2023-12-09 | Stop reason: HOSPADM

## 2023-11-29 RX ORDER — SODIUM CHLORIDE, SODIUM LACTATE, POTASSIUM CHLORIDE, CALCIUM CHLORIDE 600; 310; 30; 20 MG/100ML; MG/100ML; MG/100ML; MG/100ML
9 INJECTION, SOLUTION INTRAVENOUS CONTINUOUS
Status: DISCONTINUED | OUTPATIENT
Start: 2023-11-29 | End: 2023-12-01

## 2023-11-29 RX ORDER — FENTANYL CITRATE 50 UG/ML
INJECTION, SOLUTION INTRAMUSCULAR; INTRAVENOUS AS NEEDED
Status: DISCONTINUED | OUTPATIENT
Start: 2023-11-29 | End: 2023-11-29 | Stop reason: SURG

## 2023-11-29 RX ORDER — PROPOFOL 10 MG/ML
INJECTION, EMULSION INTRAVENOUS CONTINUOUS PRN
Status: DISCONTINUED | OUTPATIENT
Start: 2023-11-29 | End: 2023-11-29 | Stop reason: SURG

## 2023-11-29 RX ORDER — FLUMAZENIL 0.1 MG/ML
0.2 INJECTION INTRAVENOUS AS NEEDED
Status: DISCONTINUED | OUTPATIENT
Start: 2023-11-29 | End: 2023-12-09 | Stop reason: HOSPADM

## 2023-11-29 RX ORDER — FENTANYL CITRATE 50 UG/ML
25 INJECTION, SOLUTION INTRAMUSCULAR; INTRAVENOUS
Status: DISCONTINUED | OUTPATIENT
Start: 2023-11-29 | End: 2023-12-09 | Stop reason: HOSPADM

## 2023-11-29 RX ORDER — HYDRALAZINE HYDROCHLORIDE 20 MG/ML
5 INJECTION INTRAMUSCULAR; INTRAVENOUS
Status: DISCONTINUED | OUTPATIENT
Start: 2023-11-29 | End: 2023-12-09 | Stop reason: HOSPADM

## 2023-11-29 RX ORDER — IPRATROPIUM BROMIDE AND ALBUTEROL SULFATE 2.5; .5 MG/3ML; MG/3ML
3 SOLUTION RESPIRATORY (INHALATION) ONCE AS NEEDED
Status: DISCONTINUED | OUTPATIENT
Start: 2023-11-29 | End: 2023-12-04

## 2023-11-29 RX ORDER — EPHEDRINE SULFATE 50 MG/ML
5 INJECTION, SOLUTION INTRAVENOUS ONCE AS NEEDED
Status: DISCONTINUED | OUTPATIENT
Start: 2023-11-29 | End: 2023-12-09 | Stop reason: HOSPADM

## 2023-11-29 RX ADMIN — LACOSAMIDE 100 MG: 10 INJECTION INTRAVENOUS at 06:00

## 2023-11-29 RX ADMIN — MIDAZOLAM 1 MG: 1 INJECTION INTRAMUSCULAR; INTRAVENOUS at 12:00

## 2023-11-29 RX ADMIN — PROPOFOL 75 MCG/KG/MIN: 10 INJECTION, EMULSION INTRAVENOUS at 11:45

## 2023-11-29 RX ADMIN — HYDROXYCHLOROQUINE SULFATE 200 MG: 200 TABLET ORAL at 18:51

## 2023-11-29 RX ADMIN — SODIUM CHLORIDE: 9 INJECTION, SOLUTION INTRAVENOUS at 10:42

## 2023-11-29 RX ADMIN — CARVEDILOL 25 MG: 25 TABLET, FILM COATED ORAL at 09:31

## 2023-11-29 RX ADMIN — ESCITALOPRAM OXALATE 10 MG: 10 TABLET, FILM COATED ORAL at 18:51

## 2023-11-29 RX ADMIN — PANTOPRAZOLE SODIUM 40 MG: 40 INJECTION, POWDER, FOR SOLUTION INTRAVENOUS at 06:00

## 2023-11-29 RX ADMIN — LIDOCAINE HYDROCHLORIDE 60 MG: 20 INJECTION, SOLUTION INFILTRATION; PERINEURAL at 11:46

## 2023-11-29 RX ADMIN — FAMOTIDINE 20 MG: 10 INJECTION INTRAVENOUS at 11:38

## 2023-11-29 RX ADMIN — MIDAZOLAM 1 MG: 1 INJECTION INTRAMUSCULAR; INTRAVENOUS at 12:10

## 2023-11-29 RX ADMIN — CEFAZOLIN SODIUM 1000 MG: 2 INJECTION, SOLUTION INTRAVENOUS at 11:51

## 2023-11-29 RX ADMIN — CARVEDILOL 25 MG: 25 TABLET, FILM COATED ORAL at 00:00

## 2023-11-29 RX ADMIN — LACOSAMIDE 100 MG: 10 INJECTION INTRAVENOUS at 18:51

## 2023-11-29 RX ADMIN — METOCLOPRAMIDE 5 MG: 5 INJECTION, SOLUTION INTRAMUSCULAR; INTRAVENOUS at 06:00

## 2023-11-29 RX ADMIN — HYDROCODONE BITARTRATE AND ACETAMINOPHEN 1 TABLET: 7.5; 325 TABLET ORAL at 16:35

## 2023-11-29 RX ADMIN — FENTANYL CITRATE 25 MCG: 50 INJECTION, SOLUTION INTRAMUSCULAR; INTRAVENOUS at 11:55

## 2023-11-29 RX ADMIN — ASPIRIN 81 MG: 81 TABLET, CHEWABLE ORAL at 18:51

## 2023-11-29 RX ADMIN — CARVEDILOL 25 MG: 25 TABLET, FILM COATED ORAL at 21:47

## 2023-11-29 RX ADMIN — FENTANYL CITRATE 25 MCG: 50 INJECTION, SOLUTION INTRAMUSCULAR; INTRAVENOUS at 12:10

## 2023-11-29 NOTE — OP NOTE
Operative Note  Location: [facility]  Date of Admission:  10/26/2023  OR Date: 11/29/2023    Pre-op Diagnosis:   ESRD N18.6    Post-op Diagnosis:     Same    Procedure:  1) Tunneled dialysis catheter insertion (58543)  2) Ultrasound guided vascular access (29824)  3) Radiologic supervision of catheter tip placement (76574)    Assistant: None    Anesthesia: Monitored Anesthesia Care    Estimated Blood Loss: minimal    Staff:   Circulator: Heidi Colin RN  Radiology Technologist: Joanne Huang    Complications: None    Specimen: None    Findings:  Tip in SVC/Atrial     Implants: Nothing was implanted during the procedure    Indications:    The patient is an 33 y.o. female referred for tunneled dialysis catheter placement.  The risks, benefits, and alternatives have been discussed with the patient and/or family and include but are not limited to injury to artery, vein, lung, bleeding, and infection.    Procedure:  The patient was taken to the operating room and placed supine on the operating room table. After the induction of IV sedation, the neck and chest were prepped and draped with ChloraPrep. The patient was placed in Trendelenburg position. Timeout was observed. The left  Internal Jugular  was evaluated on ultrasound and found to be easily compressible. The vessel was entered under direct ultrasound guidance and photographic documentation was recorded in the chart for the record. An angled wire was then advanced down into the superior vena cava under fluoroscopy without any difficulty. Exit site was chosen on the chest. The tract was anesthetized with 1% lidocaine mixed with 0.25% Marcaine. A 23 cm catheter was then flushed and brought up onto the field. It was tunneled in an antegrade fashion up to the IJ insertion site. Dilator and peel-away sheath were then advanced over the wire under fluoroscopy without any significant difficulty. Dilator and wire were removed leaving the peel-away sheath in  place. The distal tip of the catheter was then placed into the peel-away sheath and the peel-away sheath was removed. Under fluoroscopy, the distal tip of the catheter was positioned into the right atrium. There was no kinking at the catheter insertion site. The catheter flushed and aspirated easily. The lung fields were examined. There was no evidence of hemothorax or pneumothorax. The catheter flushed and aspirated quite easily. It was locked with concentrated heparin. The catheter was then anchored to the chest with nylon sutures. A stitch and dermal glue were used to close the neck site as well. Sterile dressings were applied. The patient tolerated the procedure well. There were no immediately apparent complications.           Active Hospital Problems    Diagnosis  POA    **Dissecting ascending aortic aneurysm [I71.010]  Yes    Recent cerebrovascular accident (CVA) [Z86.73]  Yes    Aortic valve lesion [I35.8]  Yes    Severe aortic valve regurgitation [I35.1]  Yes    Hyponatremia [E87.1]  Yes    Poor appetite [R63.0]  Yes    Moderate malnutrition [E44.0]  Yes    Chronic diastolic CHF (congestive heart failure) [I50.32]  Yes    Anemia due to chronic kidney disease, on chronic dialysis [N18.6, D63.1, Z99.2]  Not Applicable    Peritoneal dialysis catheter in place [Z99.2]  Not Applicable    Essential hypertension [I10]  Yes    End stage renal disease on dialysis [N18.6, Z99.2]  Not Applicable    Seizure disorder [G40.909]  Yes    Elevated liver function tests [R79.89]  Yes    Pericardial effusion [I31.39]  Yes    Systemic lupus erythematosus [M32.9]  Yes    Thrombocytopenia [D69.6]  Yes    Hypertension secondary to other renal disorders [I15.1]  Yes    Pancytopenia [D61.818]  Yes    Vitamin D deficiency [E55.9]  Yes      Resolved Hospital Problems    Diagnosis Date Resolved POA    Abdominal pain [R10.9] 10/28/2023 Yes      Jose Patel II, MD     Date: 11/29/2023  Time: 12:33 EST

## 2023-11-29 NOTE — SIGNIFICANT NOTE
11/29/23 1001   OTHER   Discipline physical therapist   Rehab Time/Intention   Session Not Performed patient unavailable for treatment  (IN OR today. Will follow up tomorrow)   Recommendation   PT - Next Appointment 11/30/23

## 2023-11-29 NOTE — PROGRESS NOTES
Hospital Follow Up    LOS:  LOS: 34 days   Patient Name: Dee Herrera  Age/Sex: 33 y.o. female  : 1990  MRN: [id number]    Day of Service: 23   Length of Stay: 34  Encounter Provider: CAESAR Michaels  Place of Service: [facility] CARDIOLOGY  Patient Care Team:  Margarita Woods APRN as PCP - General (Nurse Practitioner)  Winnie Sanchez MD as Referring Physician (Obstetrics and Gynecology)  Norberto Almaraz MD PhD as Consulting Physician (Hematology and Oncology)  Lupis Thomas MD as Consulting Physician (Nephrology)  Hair Mckeon MD as Consulting Physician (Nephrology)  Juana Taylor MD as Consulting Physician (Cardiology)    Subjective:     Chief Complaint: follow up aortic aneurysm/dissection, severe AR    Interval History: No complaints of chest pain or SOA this morning.   Objective:     Objective:  Temp:  [98.2 °F (36.8 °C)-98.5 °F (36.9 °C)] 98.2 °F (36.8 °C)  Heart Rate:  [89] 89  Resp:  [16-18] 18  BP: ()/(61-82) 118/82     Intake/Output Summary (Last 24 hours) at 2023 0824  Last data filed at 2023 0000  Gross per 24 hour   Intake 6150 ml   Output 88824 ml   Net -3850 ml     Body mass index is 17.28 kg/m².      23  0527 23  0635 23  0546   Weight: 46.9 kg (103 lb 5.3 oz) 46.7 kg (103 lb) 47 kg (103 lb 11.3 oz)     Weight change:     Physical Exam:   General Appearance:    Awake alert and oriented, flat affect and withdrawn   Color:  Skin:  Neuro:  HEENT:    Lungs:     Pink  Warm and dry  No focal, motor or sensory deficits  Neck supple, pupils equal, round and reactive. No JVD, No Bruit  Clear to auscultation,respirations regular, even and                  unlabored    Heart:    Regular rate and rhythm, S1 and S2, + sys murmur, no gallop, no rub. No edema, DP/PT pulses are 2+   Chest Wall:    Midsternal incision clean and dry   Abdomen:     Normal bowel sounds, no masses, no organomegaly, soft        " non-tender, non-distended, no guarding, no ascites noted   Extremities:   Moves all extremities well, no edema, no cyanosis, no redness       Lab Review:   Results from last 7 days   Lab Units 11/29/23  0546 11/28/23  0526   SODIUM mmol/L 124* 125*   POTASSIUM mmol/L 4.1 3.7   CHLORIDE mmol/L 86* 85*   CO2 mmol/L 24.3 27.0   BUN mg/dL 50* 55*   CREATININE mg/dL 5.49* 4.97*   GLUCOSE mg/dL 92 104*   CALCIUM mg/dL 8.9 8.9           Results from last 7 days   Lab Units 11/29/23  0546 11/28/23  0526   WBC 10*3/mm3 7.70 7.53   HEMOGLOBIN g/dL 9.5* 9.4*   HEMATOCRIT % 28.9* 29.9*   PLATELETS 10*3/mm3 225 201         Results from last 7 days   Lab Units 11/29/23  0546 11/28/23  1830   MAGNESIUM mg/dL 2.1 2.4           Invalid input(s): \"LDLCALC\"              I reviewed the patient's new clinical results.  I personally viewed and interpreted the patient's EKG  Current Medications:   Scheduled Meds:aspirin, 81 mg, Nasogastric, Daily  carvedilol, 25 mg, Nasogastric, Q12H  ceFAZolin, 2,000 mg, Intravenous, Once  chlorhexidine, 15 mL, Mouth/Throat, Q12H  Delflex-LC/4.25% Dextrose (DIANEAL) 2,000 mL with heparin (porcine) 5000 UNIT/ML 1,500 Units dialysis solution, , Intraperitoneal, Q5H  escitalopram, 10 mg, Nasogastric, Daily  First Mouthwash (Magic Mouthwash), 10 mL, Swish & Spit, Q6H  hydroxychloroquine, 200 mg, Oral, Daily  insulin regular, 2-7 Units, Subcutaneous, Q6H  Lacosamide, 100 mg, Intravenous, Q12H  lactulose, 10 g, Nasogastric, TID  lidocaine, 10 mL, Subcutaneous, Once  metoclopramide, 5 mg, Intravenous, Q6H  midodrine, 2.5 mg, Oral, TID AC  mupirocin, , Each Nare, BID  mycophenolate, 250 mg, Oral, Q12H  pantoprazole, 40 mg, Intravenous, Q AM      Continuous Infusions:hold, 1 each        Allergies:  Allergies   Allergen Reactions    Minoxidil Hives and Other (See Comments)     Pericardial effusion .       Assessment:       Dissecting ascending aortic aneurysm    Vitamin D deficiency    Thrombocytopenia    " Hypertension secondary to other renal disorders    Pancytopenia    Systemic lupus erythematosus    Pericardial effusion    End stage renal disease on dialysis    Seizure disorder    Elevated liver function tests    Essential hypertension    Peritoneal dialysis catheter in place    Anemia due to chronic kidney disease, on chronic dialysis    Hyponatremia    Poor appetite    Moderate malnutrition    Chronic diastolic CHF (congestive heart failure)    Aortic valve lesion    Severe aortic valve regurgitation    Recent cerebrovascular accident (CVA)        Plan:   Ascending aortic aneurysm with subacute/chronic aortic root dissection: s/p repair and proximal aortic replacement on 11/2.   Severe aortic regurgitation: s/p repair on 11/2.   Cardiac tamponade: secondary to pericardial thrombus anteriorly and compressing right ventricle. S/p reexploration with clot removal and treatment of a small amount of bleeding at the suture line on 11/6.  Cardiogenic shock: due to #3. Resolved  Seizures: postoperatively. EEG normal.   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  Hyponatremia: resolved  HTN: blood pressure stable. On midodrine and carvedilol  Chronic anemia: stable  SLE  Depression: Access center has seen patient. At this point, she denies any depression. She was started on escitalopram.    Right MCA CVA: confirmed by MRI. Neurology expects full recovery.      -plans to switch to HD per nephrology. Vascular surgery has been consulted.  -BP stable on midodrine and carvedilol but still with occasional low reading. With plans to go back on HD, will continue midodrine for now. Hopefully we can stop this at some point.    CAESAR Michaels  11/29/23  08:24 EST  Electronically signed by CAESAR Damon, 11/15/23, 9:03 AM ELIJAH.

## 2023-11-29 NOTE — PLAN OF CARE
Goal Outcome Evaluation:  Plan of Care Reviewed With: patient        Progress: improving  Outcome Evaluation: Pt participated in OT today, she does report she is tired throughout session. She was able to work on endurance, ADL engagement at the sink today and needed mod A for her bath. She needs extra time to complete all ADLs. She was able to mobilize with HHA today to stretcher with min A. She plans to have HD tunnel cath placed today.      Anticipated Discharge Disposition (OT): inpatient rehabilitation facility, skilled nursing facility

## 2023-11-29 NOTE — ANESTHESIA POSTPROCEDURE EVALUATION
Patient: Dee Herrera    Procedure Summary       Date: 11/29/23 Room / Location: [facility] OR [address] [place] [address] MAIN OR    Anesthesia Start: 1141 Anesthesia Stop: 1235    Procedure: TUNNELED DIALYSIS CATHETER PLACEMENT (Left: Chest) Diagnosis:     Surgeons: Jose Patel II, MD Provider: Homero Barr MD    Anesthesia Type: MAC ASA Status: 4            Anesthesia Type: MAC    Vitals  Vitals Value Taken Time   /90 11/29/23 1345   Temp 36.6 °C (97.8 °F) 11/29/23 1235   Pulse 66 11/29/23 1356   Resp 16 11/29/23 1315   SpO2 98 % 11/29/23 1356   Vitals shown include unfiled device data.        Post Anesthesia Care and Evaluation    Patient location during evaluation: PHASE II  Patient participation: complete - patient participated  Level of consciousness: awake and alert  Pain management: adequate    Airway patency: patent  Anesthetic complications: No anesthetic complications  PONV Status: none  Cardiovascular status: acceptable and hemodynamically stable  Respiratory status: acceptable, nonlabored ventilation and spontaneous ventilation  Hydration status: acceptable

## 2023-11-29 NOTE — PROGRESS NOTES
Name: Dee Herrera ADMIT: 10/26/2023   : 1990  PCP: Margarita Woods APRN    MRN: [id number] LOS: 34 days   AGE/SEX: 33 y.o. 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  Likely related to prior dialysis catheters.  New tunneled dialysis catheter placed via left IJ without complication.  Will order right upper extremity venous duplex to confirm.  I recommend 6 months anticoagulation for provoked DVT.       Jose Patel II, MD  23  12:41 EST  Office Number [phone]

## 2023-11-29 NOTE — ANESTHESIA PREPROCEDURE EVALUATION
Anesthesia Evaluation     Patient summary reviewed and Nursing notes reviewed                Airway   Mallampati: II  Dental - normal exam     Pulmonary - normal exam   (+) a smoker Former,shortness of breath  Cardiovascular - normal exam    ECG reviewed  Patient on routine beta blocker    (+) hypertension, CABG, CHF       Neuro/Psych  (+) seizures, headaches  GI/Hepatic/Renal/Endo    (+) renal disease- ESRD and dialysis  (-) diabetes, no thyroid disorder    Musculoskeletal     Abdominal    Substance History      OB/GYN          Other   autoimmune disease lupus,                   Anesthesia Plan    ASA 4     MAC       Anesthetic plan, risks, benefits, and alternatives have been provided, discussed and informed consent has been obtained with: patient.    CODE STATUS:    Code Status (Patient has no pulse and is not breathing): CPR (Attempt to Resuscitate)  Medical Interventions (Patient has pulse or is breathing): Full Support

## 2023-11-29 NOTE — PROGRESS NOTES
[facility] Progress Note      Patient Name: Dee Herrera  : 1990  MRN: [id number]  Primary Care Physician:  Margarita Wodos APRN  Date of admission: 10/26/2023    Subjective     Interval History:   Follow-up end-stage renal disease currently on peritoneal dialysis    Patient finally became agreeable to have a tunneled dialysis catheter placed she has been dry overnight with no peritoneal dialysate.  No chest pain or shortness of air, no orthopnea or PND, no nausea or vomiting    Review of Systems:   As noted above    Objective     Vitals:   Temp:  [98.2 °F (36.8 °C)-98.5 °F (36.9 °C)] 98.2 °F (36.8 °C)  Heart Rate:  [89] 89  Resp:  [16-18] 18  BP: ()/(61-82) 118/82    Intake/Output Summary (Last 24 hours) at 2023 0920  Last data filed at 2023 0000  Gross per 24 hour   Intake 6000 ml   Output 47619 ml   Net -4000 ml       Physical Exam:    General Appearance: alert, awake, chronically ill, no acute distress   Skin: warm and dry  HEENT: Core track is in place  Neck: supple, no JVD  Lungs: Bilateral rhonchi, breathing effort not labored  Heart: RRR, normal S1 and S2, no rub  Abdomen: soft, nontender, nondistended, normoactive bowels, PD catheter in place with clean exit site no tunnel tenderness  Extremities: no edema, cyanosis or clubbing  Neuro: Moving all extremities    Scheduled Meds:     aspirin, 81 mg, Nasogastric, Daily  carvedilol, 25 mg, Nasogastric, Q12H  ceFAZolin, 2,000 mg, Intravenous, Once  chlorhexidine, 15 mL, Mouth/Throat, Q12H  Delflex-LC/4.25% Dextrose (DIANEAL) 2,000 mL with heparin (porcine) 5000 UNIT/ML 1,500 Units dialysis solution, , Intraperitoneal, Q5H  escitalopram, 10 mg, Nasogastric, Daily  First Mouthwash (Magic Mouthwash), 10 mL, Swish & Spit, Q6H  hydroxychloroquine, 200 mg, Oral, Daily  insulin regular, 2-7 Units, Subcutaneous, Q6H  Lacosamide, 100 mg, Intravenous, Q12H  lactulose, 10 g, Nasogastric, TID  lidocaine, 10 mL,  Subcutaneous, Once  metoclopramide, 5 mg, Intravenous, Q6H  midodrine, 2.5 mg, Oral, TID AC  mupirocin, , Each Nare, BID  mycophenolate, 250 mg, Oral, Q12H  pantoprazole, 40 mg, Intravenous, Q AM      IV Meds:   hold, 1 each        Results Reviewed:   I have personally reviewed the results from the time of this admission to 11/29/2023 09:20 EST     Results from last 7 days   Lab Units 11/29/23  0546 11/28/23  0526 11/27/23  0704   SODIUM mmol/L 124* 125* 123*   POTASSIUM mmol/L 4.1 3.7 4.4   CHLORIDE mmol/L 86* 85* 84*   CO2 mmol/L 24.3 27.0 25.3   BUN mg/dL 50* 55* 54*   CREATININE mg/dL 5.49* 4.97* 5.15*   CALCIUM mg/dL 8.9 8.9 9.1   GLUCOSE mg/dL 92 104* 79       Estimated Creatinine Clearance: 11 mL/min (A) (by C-G formula based on SCr of 5.49 mg/dL (H)).    Results from last 7 days   Lab Units 11/29/23  0546 11/28/23  1830 11/28/23  0526 11/27/23  0704   MAGNESIUM mg/dL 2.1 2.4 1.4*  --    PHOSPHORUS mg/dL 3.9  --  4.2 4.1             Results from last 7 days   Lab Units 11/29/23  0546 11/28/23  0526 11/27/23  0704 11/26/23  0612 11/25/23  0552   WBC 10*3/mm3 7.70 7.53 7.31 7.92 8.69   HEMOGLOBIN g/dL 9.5* 9.4* 9.6* 10.2* 9.4*   PLATELETS 10*3/mm3 225 201 203 212 182             Assessment / Plan     ASSESSMENT:  End-stage renal disease on peritoneal dialysis etiology of her ESRD is related to lupus nephritis.  No evidence of hypervolemia but the patient remains hyponatremic, will be transitioning to hemodialysis today  Hyponatremia, sodium 124, volume status improved but the sodium is not improved.  Despite maximum fluid removal with dialysis and fluid restriction  Acute CVA, cortical infarct in the anterior inferior medial right frontal lobe and subacute subdural hematoma right occipital.  Ascending aortic aneurysm with subacute/chronic aortic root dissection, status postrepair of proximal aortic aneurysm 11/2/2023.  Reexploration for cardiac tamponade on 11/6/2023.  Seizure disorder appears to be stable  currently on lacosamide  Anemia of chronic kidney disease hemoglobin today is 9.5  Hypokalemia, stable and resolved potassium 3.7  Hypomagnesemia, magnesium magnesium corrected up to two-point    PLAN:  Hemodialysis today, will lower the dialysate sodium to prevent fast correction of the hyponatremia will run the dialysate sodium 130 mEq/L  Continue fluid restriction 1000 cc per 24 hours  Surveillance labs    I reviewed the chart and other providers notes, I reviewed labs.  I discussed the case with the patient .  I discussed the case with Dr. Arabella Pacheco  I discussed the case with the patient and with the nursing staff.    Thank you for involving us in the care of Dee Herrera.  Please feel free to call with any questions.    Isaac Diaz MD  11/29/23  09:20 [facility]    [facility]  [phone]    Please note that portions of this note were completed with a voice recognition program.

## 2023-11-29 NOTE — THERAPY TREATMENT NOTE
Patient Name: Dee Herrera  : 1990    MRN: [id number]                              Today's Date: 2023       Admit Date: 10/26/2023    Visit Dx:     ICD-10-CM ICD-9-CM   1. Shortness of breath  R06.02 786.05   2. ESRD (end stage renal disease) on dialysis  N18.6 585.6    Z99.2 V45.11   3. Hyponatremia  E87.1 276.1   4. Generalized abdominal pain  R10.84 789.07   5. Elevated lactic acid level  R79.89 276.2   6. Elevated troponin  R79.89 790.6   7. Severe aortic valve regurgitation  I35.1 424.1   8. Pericardial effusion  I31.39 423.9   9. S/P AVR  Z95.2 V43.3   10. 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 syndrome    Systemic lupus erythematosus    Lupus nephritis, ISN/RPS class IV    Hypokalemia    Hypocalcemia    COVID-19    Hospital discharge follow-up    Stage 5 chronic kidney disease    Cardiac cirrhosis    Pancreatitis    Duodenitis    Regional enteritis of small bowel    Pericardial effusion    End stage renal disease on dialysis    Hemodialysis status    Seizure disorder    Elevated liver function tests    C. difficile colitis    Anemia, chronic disease    Essential hypertension    Peritoneal dialysis catheter in place    Anemia due to chronic kidney disease, on chronic dialysis    Alternating constipation and diarrhea    Abnormal stress test    Hyponatremia    Poor appetite    Moderate malnutrition    Chronic diastolic CHF (congestive heart failure)    Aortic valve lesion    Severe aortic valve regurgitation    Dissecting ascending aortic aneurysm    Recent cerebrovascular accident (CVA)     Past Medical  History:   Diagnosis Date    Anasarca     PER CT SCAN    Dry skin     ESRD (end stage renal disease) on dialysis     MARCO COLE, KATIE FRASER    History of abdominal pain     History of anemia     History of transfusion     Hypertension     Iron deficiency anemia 09/27/2021    Lupus (systemic lupus erythematosus) 07/30/2022    Migraine     Other specified nutritional anemias     Pericardial effusion     Renal insufficiency     Seizures     STATES LAST WAS 1/2023    Shortness of breath     OCCASIONAL    Vitamin D deficiency 09/27/2021     Past Surgical History:   Procedure Laterality Date    ASCENDING AORTIC ANEURYSM REPAIR W/ MECHANICAL AORTIC VALVE REPLACEMENT N/A 11/2/2023    Procedure: CHETNA STERNOTOMY, AORTIC ROOT REPLACEMENT WITH VALVE SPARING MISHEL PROCEDURE, REPLACEMENT OF ASCENDING AORTA, RIGHT FEMORAL DIALYSIS CATHETER PLACEMENT AND PRP;  Surgeon: Chris Medina MD;  Location: [facility] CVOR;  Service: Cardiothoracic;  Laterality: N/A;    CARDIAC CATHETERIZATION N/A 06/14/2023    Procedure: Coronary angiography;  Surgeon: Juana Taylor MD;  Location: [facility] CATH INVASIVE LOCATION;  Service: Cardiovascular;  Laterality: N/A;    CARDIAC CATHETERIZATION N/A 06/14/2023    Procedure: Left heart cath;  Surgeon: Juana Taylor MD;  Location: [facility] CATH INVASIVE LOCATION;  Service: Cardiovascular;  Laterality: N/A;    CARDIAC CATHETERIZATION N/A 06/14/2023    Procedure: Right Heart Cath;  Surgeon: Juana Taylor MD;  Location: [facility] CATH INVASIVE LOCATION;  Service: Cardiovascular;  Laterality: N/A;    COLONOSCOPY N/A 7/20/2023    Procedure: COLONOSCOPY to cecum with biopsy;  Surgeon: Drew Kaminski MD;  Location: [facility] ENDOSCOPY;  Service: Gastroenterology;  Laterality: N/A;  PRE - diarrhea, constipation  POST - fair prep, normal    CORONARY ARTERY BYPASS GRAFT N/A 11/6/2023    Procedure: STERNAL EXPLORATION AND WASH OUT;  Surgeon: Jr Mitesh Quiroz MD;  Location: [facility] CVOR;  Service:  Cardiothoracic;  Laterality: N/A;    ENDOSCOPY N/A 7/20/2023    Procedure: ESOPHAGOGASTRODUODENOSCOPY with biopsy;  Surgeon: Drew Kaminski MD;  Location: [facility] ENDOSCOPY;  Service: Gastroenterology;  Laterality: N/A;  PRE - abn ct abd  POST - gastritis    INSERTION HEMODIALYSIS CATHETER N/A 07/26/2022    Procedure: RIGHT TUNNELED DIALYSIS CATHETER PLACEMENT;  Surgeon: Diandra Adhikari MD;  Location: [facility] MAIN OR;  Service: Vascular;  Laterality: N/A;    INSERTION PERITONEAL DIALYSIS CATHETER N/A 04/03/2023    Procedure: INSERTION PERITONEAL DIALYSIS CATHETER LAPAROSCOPIC, omentumpexy;  Surgeon: Jemal Loyola MD;  Location: [facility] MAIN OR;  Service: General;  Laterality: N/A;    TONSILLECTOMY        General Information       Row Name 11/29/23 1253          OT Time and Intention    Document Type therapy note (daily note)  -     Mode of Treatment occupational therapy;individual therapy  -       Row Name 11/29/23 1252          General Information    Patient Profile Reviewed yes  -     Existing Precautions/Restrictions fall;sternal;cardiac  -       Row Name 11/29/23 1258          Cognition    Orientation Status (Cognition) oriented x 3  memory deficits noted, pt frequently asking what procedure she is going for today. RN in room explaining to pt as well as her mom on the phone  -       Row Name 11/29/23 1259          Safety Issues, Functional Mobility    Impairments Affecting Function (Mobility) balance;cognition;endurance/activity tolerance;strength;coordination  -               User Key  (r) = Recorded By, (t) = Taken By, (c) = Cosigned By      Initials Name Provider Type     Etta Bustillos OT Occupational Therapist                     Mobility/ADL's       Row Name 11/29/23 1253          Bed Mobility    Supine-Sit [place] (Bed Mobility) minimum assist (75% patient effort);verbal cues  -       Row Name 11/29/23 125          Sit-Stand Transfer    Sit-Stand [place]  (Transfers) minimum assist (75% patient effort);verbal cues  -       Row Name 11/29/23 1254          Functional Mobility    Functional Mobility- Ind. Level minimum assist (75% patient effort)  -     Functional Mobility-Distance (Feet) --  20  -       Row Name 11/29/23 1254          Activities of Daily Living    BADL Assessment/Intervention bathing;upper body dressing;grooming;toileting;lower body dressing  -[facility] Name 11/29/23 1254          Bathing Assessment/Intervention    [place] Level (Bathing) bathing skills;moderate assist (50% patient effort)  -     Assistive Devices (Bathing) shower chair  -     Position (Bathing) sink side;supported sitting  -     Comment, (Bathing) pt with a slow pace, has to have nsg go over for throughness with CVG wipes in prep to procedure today  -[facility] Name 11/29/23 1254          Upper Body Dressing Assessment/Training    [place] Level (Upper Body Dressing) don;pajama/robe;standby assist  -     Position (Upper Body Dressing) supported sitting  -SM       Row Name 11/29/23 1254          Grooming Assessment/Training    [place] Level (Grooming) oral care regimen;contact guard assist;standby assist  -     Position (Grooming) sink side;supported sitting;supported standing  -     Comment, (Grooming) pt takes rest breaks seated during  -[facility] Name 11/29/23 1254          Toileting Assessment/Training    [place] Level (Toileting) moderate assist (50% patient effort)  -     Comment, (Toileting) brief changed  -               User Key  (r) = Recorded By, (t) = Taken By, (c) = Cosigned By      Initials Name Provider Type    SM Etta Bustillos OT Occupational Therapist                   Obj/Interventions       Row Name 11/29/23 1256          Motor Skills    Functional Endurance poor +  -[facility] Name 11/29/23 1256          Balance    Static Sitting Balance standby assist  -     Static Standing Balance contact guard  -      Dynamic Standing Balance minimal assist  -SM     Position/Device Used, Standing Balance supported  -SM     Balance Interventions standing;occupation based/functional task  -     Comment, Balance HHA today for standing  -               User Key  (r) = Recorded By, (t) = Taken By, (c) = Cosigned By      Initials Name Provider Type    Etta Maya OT Occupational Therapist                   Goals/Plan    No documentation.                  Clinical Impression       Row Name 11/29/23 1257          Pain Assessment    Pretreatment Pain Rating 0/10 - no pain  -SM     Posttreatment Pain Rating 0/10 - no pain  -SM     Pre/Posttreatment Pain Comment no c/o of pain  -SM       Row Name 11/29/23 1257          Plan of Care Review    Plan of Care Reviewed With patient  -     Outcome Evaluation Pt participated in OT today, she does report she is tired throughout session. She was able to work on endurance, ADL engagement at the sink today and needed mod A for her bath. She needs extra time to complete all ADLs. She was able to mobilize with HHA today to stretcher with min A. She plans to have HD tunnel cath placed today.  -       Row Name 11/29/23 1257          Therapy Plan Review/Discharge Plan (OT)    Anticipated Discharge Disposition (OT) inpatient rehabilitation facility;skilled nursing facility  -       Row Name 11/29/23 1257          Positioning and Restraints    Pre-Treatment Position in bed  -SM     Post Treatment Position other  -SM     Other Position with other staff  -               User Key  (r) = Recorded By, (t) = Taken By, (c) = Cosigned By      Initials Name Provider Type    Etta Maya OT Occupational Therapist                   Outcome Measures       Row Name 11/29/23 1258          How much help from another is currently needed...    Putting on and taking off regular lower body clothing? 3  -SM     Bathing (including washing, rinsing, and drying) 2  -SM     Toileting (which includes  using toilet bed pan or urinal) 2  -SM     Putting on and taking off regular upper body clothing 3  -SM     Taking care of personal grooming (such as brushing teeth) 3  -SM     Eating meals 3  -SM     AM-PAC 6 Clicks Score (OT) 16  -SM       Row Name 11/29/23 0900          How much help from another person do you currently need...    Turning from your back to your side while in flat bed without using bedrails? 3  -ES     Moving from lying on back to sitting on the side of a flat bed without bedrails? 3  -ES     Moving to and from a bed to a chair (including a wheelchair)? 3  -ES     Standing up from a chair using your arms (e.g., wheelchair, bedside chair)? 3  -ES     Climbing 3-5 steps with a railing? 2  -ES     To walk in hospital room? 3  -ES     AM-PAC 6 Clicks Score (PT) 17  -ES     Highest Level of Mobility Goal 5 --> Static standing  -ES       Row Name 11/29/23 1258          Functional Assessment    Outcome Measure Options AM-PAC 6 Clicks Daily Activity (OT)  -               User Key  (r) = Recorded By, (t) = Taken By, (c) = Cosigned By      Initials Name Provider Type    Etta Maya OT Occupational Therapist    Shea Hopper RN Registered Nurse                    Occupational Therapy Education       Title: PT OT SLP Therapies (In Progress)       Topic: Occupational Therapy (In Progress)       Point: ADL training (Done)       Description:   Instruct learner(s) on proper safety adaptation and remediation techniques during self care or transfers.   Instruct in proper use of assistive devices.                  Learning Progress Summary             Patient Acceptance, E, VU by  at 11/28/2023 1340    Comment: progress towards goals, OT POC, AROM HEP of UEs to increase endurance/strength   Family Acceptance, E, VU by  at 11/28/2023 1340    Comment: progress towards goals, OT POC, AROM HEP of UEs to increase endurance/strength                         Point: Home exercise program (Not Started)        Description:   Instruct learner(s) on appropriate technique for monitoring, assisting and/or progressing therapeutic exercises/activities.                  Learner Progress:  Not documented in this visit.              Point: Precautions (Not Started)       Description:   Instruct learner(s) on prescribed precautions during self-care and functional transfers.                  Learner Progress:  Not documented in this visit.              Point: Body mechanics (Not Started)       Description:   Instruct learner(s) on proper positioning and spine alignment during self-care, functional mobility activities and/or exercises.                  Learner Progress:  Not documented in this visit.                              User Key       Initials Effective Dates Name Provider Type Discipline     04/02/20 -  Etta Bustillos OT Occupational Therapist OT                  OT Recommendation and Plan  Planned Therapy Interventions (OT): activity tolerance training, adaptive equipment training, BADL retraining, functional balance retraining, occupation/activity based interventions, patient/caregiver education/training, transfer/mobility retraining, strengthening exercise, ROM/therapeutic exercise  Therapy Frequency (OT): 5 times/wk  Plan of Care Review  Plan of Care Reviewed With: patient  Progress: improving  Outcome Evaluation: Pt participated in OT today, she does report she is tired throughout session. She was able to work on endurance, ADL engagement at the sink today and needed mod A for her bath. She needs extra time to complete all ADLs. She was able to mobilize with HHA today to stretcher with min A. She plans to have HD tunnel cath placed today.     Time Calculation:         Time Calculation- OT       Row Name 11/29/23 1259             Time Calculation- OT    OT Start Time 0921  -      OT Stop Time 0945  -      OT Time Calculation (min) 24 min  -      Total Timed Code Minutes- OT 24 minute(s)  -      OT  Received On 11/29/23  -SM      OT - Next Appointment 11/30/23  -SM         Timed Charges    13376 - OT Self Care/Mgmt Minutes 24  -SM         Total Minutes    Timed Charges Total Minutes 24  -SM       Total Minutes 24  -SM                User Key  (r) = Recorded By, (t) = Taken By, (c) = Cosigned By      Initials Name Provider Type     Etta Bustillos OT Occupational Therapist                  Therapy Charges for Today       Code Description Service Date Service Provider Modifiers Qty    02387086768 HC OT THER PROC EA 15 MIN 11/28/2023 Etta Bustillos OT GO 1    88366528023 HC OT SELF CARE/MGMT/TRAIN EA 15 MIN 11/28/2023 Etta Bustillos OT GO 1    95927600041 HC OT SELF CARE/MGMT/TRAIN EA 15 MIN 11/29/2023 Etta Bustillos OT GO 2                 Etta Bustillos OT  11/29/2023

## 2023-11-29 NOTE — PROGRESS NOTES
[facility] attempted to f/u on pt; however pt in surgery. Chart reviewed w/ no significant changes.   [facility] following.

## 2023-11-29 NOTE — PROGRESS NOTES
Attempted to see the patient, she has been in the OR for most part of the day.  Will reattempt to see her again with repeat labs in the morning.  Chart has been reviewed, no acute events noted.

## 2023-11-29 NOTE — PROGRESS NOTES
Patient in OR for TDC placement. Remains stable from a surgical standpoint. We will see tomorrow.    Electronically signed by CAESAR Alonzo, 11/29/23, 10:09 AM EST.

## 2023-11-30 ENCOUNTER — APPOINTMENT (OUTPATIENT)
Dept: CARDIOLOGY | Facility: HOSPITAL | Age: 33
DRG: 219 | End: 2023-11-30
Payer: MEDICARE

## 2023-11-30 LAB
ALBUMIN SERPL-MCNC: 2 G/DL (ref 3.5–5.2)
ANION GAP SERPL CALCULATED.3IONS-SCNC: 7.7 MMOL/L (ref 5–15)
BASOPHILS # BLD AUTO: 0.02 10*3/MM3 (ref 0–0.2)
BASOPHILS NFR BLD AUTO: 0.3 % (ref 0–1.5)
BH CV UPPER VENOUS LEFT INTERNAL JUGULAR AUGMENT: NORMAL
BH CV UPPER VENOUS LEFT INTERNAL JUGULAR COMPRESS: NORMAL
BH CV UPPER VENOUS LEFT INTERNAL JUGULAR PHASIC: NORMAL
BH CV UPPER VENOUS LEFT INTERNAL JUGULAR SPONT: NORMAL
BH CV UPPER VENOUS LEFT SUBCLAVIAN AUGMENT: NORMAL
BH CV UPPER VENOUS LEFT SUBCLAVIAN COMPRESS: NORMAL
BH CV UPPER VENOUS LEFT SUBCLAVIAN PHASIC: NORMAL
BH CV UPPER VENOUS LEFT SUBCLAVIAN SPONT: NORMAL
BH CV UPPER VENOUS RIGHT AXILLARY AUGMENT: NORMAL
BH CV UPPER VENOUS RIGHT AXILLARY COMPRESS: NORMAL
BH CV UPPER VENOUS RIGHT AXILLARY PHASIC: NORMAL
BH CV UPPER VENOUS RIGHT AXILLARY SPONT: NORMAL
BH CV UPPER VENOUS RIGHT BASILIC FOREARM COMPRESS: NORMAL
BH CV UPPER VENOUS RIGHT BASILIC UPPER COMPRESS: NORMAL
BH CV UPPER VENOUS RIGHT BRACHIAL COMPRESS: NORMAL
BH CV UPPER VENOUS RIGHT CEPHALIC FOREARM COMPRESS: NORMAL
BH CV UPPER VENOUS RIGHT CEPHALIC UPPER COMPRESS: NORMAL
BH CV UPPER VENOUS RIGHT INTERNAL JUGULAR AUGMENT: NORMAL
BH CV UPPER VENOUS RIGHT INTERNAL JUGULAR COLOR: 1
BH CV UPPER VENOUS RIGHT INTERNAL JUGULAR COMPRESS: NORMAL
BH CV UPPER VENOUS RIGHT INTERNAL JUGULAR PHASIC: NORMAL
BH CV UPPER VENOUS RIGHT INTERNAL JUGULAR SPONT: NORMAL
BH CV UPPER VENOUS RIGHT INTERNAL JUGULAR THROMBUS: NORMAL
BH CV UPPER VENOUS RIGHT RADIAL COMPRESS: NORMAL
BH CV UPPER VENOUS RIGHT SUBCLAVIAN AUGMENT: NORMAL
BH CV UPPER VENOUS RIGHT SUBCLAVIAN COMPRESS: NORMAL
BH CV UPPER VENOUS RIGHT SUBCLAVIAN PHASIC: NORMAL
BH CV UPPER VENOUS RIGHT SUBCLAVIAN SPONT: NORMAL
BH CV UPPER VENOUS RIGHT ULNAR COMPRESS: NORMAL
BH CV VAS PRELIMINARY FINDINGS SCRIPTING: 1
BUN SERPL-MCNC: 26 MG/DL (ref 6–20)
BUN/CREAT SERPL: 7.4 (ref 7–25)
CALCIUM SPEC-SCNC: 8.7 MG/DL (ref 8.6–10.5)
CHLORIDE SERPL-SCNC: 94 MMOL/L (ref 98–107)
CO2 SERPL-SCNC: 25.3 MMOL/L (ref 22–29)
CREAT SERPL-MCNC: 3.51 MG/DL (ref 0.57–1)
DEPRECATED RDW RBC AUTO: 40.9 FL (ref 37–54)
EGFRCR SERPLBLD CKD-EPI 2021: 17.2 ML/MIN/1.73
EOSINOPHIL # BLD AUTO: 0.14 10*3/MM3 (ref 0–0.4)
EOSINOPHIL NFR BLD AUTO: 2.3 % (ref 0.3–6.2)
ERYTHROCYTE [DISTWIDTH] IN BLOOD BY AUTOMATED COUNT: 13.9 % (ref 12.3–15.4)
GLUCOSE BLDC GLUCOMTR-MCNC: 122 MG/DL (ref 70–130)
GLUCOSE BLDC GLUCOMTR-MCNC: 87 MG/DL (ref 70–130)
GLUCOSE BLDC GLUCOMTR-MCNC: 91 MG/DL (ref 70–130)
GLUCOSE SERPL-MCNC: 80 MG/DL (ref 65–99)
HCT VFR BLD AUTO: 26.7 % (ref 34–46.6)
HGB BLD-MCNC: 8.3 G/DL (ref 12–15.9)
IMM GRANULOCYTES # BLD AUTO: 0.08 10*3/MM3 (ref 0–0.05)
IMM GRANULOCYTES NFR BLD AUTO: 1.3 % (ref 0–0.5)
LYMPHOCYTES # BLD AUTO: 0.55 10*3/MM3 (ref 0.7–3.1)
LYMPHOCYTES NFR BLD AUTO: 9 % (ref 19.6–45.3)
MAGNESIUM SERPL-MCNC: 1.9 MG/DL (ref 1.6–2.6)
MCH RBC QN AUTO: 25.6 PG (ref 26.6–33)
MCHC RBC AUTO-ENTMCNC: 31.1 G/DL (ref 31.5–35.7)
MCV RBC AUTO: 82.4 FL (ref 79–97)
MONOCYTES # BLD AUTO: 0.8 10*3/MM3 (ref 0.1–0.9)
MONOCYTES NFR BLD AUTO: 13.1 % (ref 5–12)
NEUTROPHILS NFR BLD AUTO: 4.51 10*3/MM3 (ref 1.7–7)
NEUTROPHILS NFR BLD AUTO: 74 % (ref 42.7–76)
NRBC BLD AUTO-RTO: 0 /100 WBC (ref 0–0.2)
PHOSPHATE SERPL-MCNC: 4.4 MG/DL (ref 2.5–4.5)
PLATELET # BLD AUTO: 240 10*3/MM3 (ref 140–450)
PMV BLD AUTO: 11.7 FL (ref 6–12)
POTASSIUM SERPL-SCNC: 4.5 MMOL/L (ref 3.5–5.2)
RBC # BLD AUTO: 3.24 10*6/MM3 (ref 3.77–5.28)
SODIUM SERPL-SCNC: 127 MMOL/L (ref 136–145)
WBC NRBC COR # BLD AUTO: 6.1 10*3/MM3 (ref 3.4–10.8)

## 2023-11-30 PROCEDURE — 25010000002 LACOSAMIDE 200 MG/20ML SOLUTION: Performed by: STUDENT IN AN ORGANIZED HEALTH CARE EDUCATION/TRAINING PROGRAM

## 2023-11-30 PROCEDURE — 93971 EXTREMITY STUDY: CPT

## 2023-11-30 PROCEDURE — 97110 THERAPEUTIC EXERCISES: CPT

## 2023-11-30 PROCEDURE — 85025 COMPLETE CBC W/AUTO DIFF WBC: CPT | Performed by: STUDENT IN AN ORGANIZED HEALTH CARE EDUCATION/TRAINING PROGRAM

## 2023-11-30 PROCEDURE — 99221 1ST HOSP IP/OBS SF/LOW 40: CPT

## 2023-11-30 PROCEDURE — 63710000001 ONDANSETRON PER 8 MG: Performed by: INTERNAL MEDICINE

## 2023-11-30 PROCEDURE — 82948 REAGENT STRIP/BLOOD GLUCOSE: CPT

## 2023-11-30 PROCEDURE — C9254 INJECTION, LACOSAMIDE: HCPCS | Performed by: STUDENT IN AN ORGANIZED HEALTH CARE EDUCATION/TRAINING PROGRAM

## 2023-11-30 PROCEDURE — 25010000002 METOCLOPRAMIDE PER 10 MG: Performed by: STUDENT IN AN ORGANIZED HEALTH CARE EDUCATION/TRAINING PROGRAM

## 2023-11-30 PROCEDURE — 97530 THERAPEUTIC ACTIVITIES: CPT

## 2023-11-30 PROCEDURE — 97535 SELF CARE MNGMENT TRAINING: CPT

## 2023-11-30 PROCEDURE — 99232 SBSQ HOSP IP/OBS MODERATE 35: CPT | Performed by: NURSE PRACTITIONER

## 2023-11-30 PROCEDURE — 80069 RENAL FUNCTION PANEL: CPT | Performed by: STUDENT IN AN ORGANIZED HEALTH CARE EDUCATION/TRAINING PROGRAM

## 2023-11-30 PROCEDURE — 99024 POSTOP FOLLOW-UP VISIT: CPT | Performed by: THORACIC SURGERY (CARDIOTHORACIC VASCULAR SURGERY)

## 2023-11-30 PROCEDURE — 63710000001 MYCOPHENOLATE MOFETIL PER 250 MG: Performed by: STUDENT IN AN ORGANIZED HEALTH CARE EDUCATION/TRAINING PROGRAM

## 2023-11-30 PROCEDURE — 99221 1ST HOSP IP/OBS SF/LOW 40: CPT | Performed by: SURGERY

## 2023-11-30 PROCEDURE — 83735 ASSAY OF MAGNESIUM: CPT | Performed by: STUDENT IN AN ORGANIZED HEALTH CARE EDUCATION/TRAINING PROGRAM

## 2023-11-30 RX ORDER — ONDANSETRON 4 MG/1
4 TABLET, FILM COATED ORAL
Status: DISCONTINUED | OUTPATIENT
Start: 2023-11-30 | End: 2023-12-09 | Stop reason: HOSPADM

## 2023-11-30 RX ORDER — MANNITOL 250 MG/ML
12.5 INJECTION, SOLUTION INTRAVENOUS AS NEEDED
Status: CANCELLED | OUTPATIENT
Start: 2023-12-01 | End: 2023-12-01

## 2023-11-30 RX ADMIN — CARBIDOPA AND LEVODOPA 2.5 MG: 50; 200 TABLET, EXTENDED RELEASE ORAL at 05:44

## 2023-11-30 RX ADMIN — LACTULOSE 10 G: 10 SOLUTION ORAL at 09:43

## 2023-11-30 RX ADMIN — ASPIRIN 81 MG: 81 TABLET, CHEWABLE ORAL at 09:40

## 2023-11-30 RX ADMIN — LACTULOSE 10 G: 10 SOLUTION ORAL at 17:31

## 2023-11-30 RX ADMIN — CARVEDILOL 25 MG: 25 TABLET, FILM COATED ORAL at 20:37

## 2023-11-30 RX ADMIN — METOCLOPRAMIDE 5 MG: 5 INJECTION, SOLUTION INTRAMUSCULAR; INTRAVENOUS at 20:37

## 2023-11-30 RX ADMIN — MYCOPHENOLATE MOFETIL 250 MG: 500 TABLET, FILM COATED ORAL at 09:41

## 2023-11-30 RX ADMIN — METOCLOPRAMIDE 5 MG: 5 INJECTION, SOLUTION INTRAMUSCULAR; INTRAVENOUS at 13:22

## 2023-11-30 RX ADMIN — HYDROXYCHLOROQUINE SULFATE 200 MG: 200 TABLET ORAL at 09:40

## 2023-11-30 RX ADMIN — ONDANSETRON HYDROCHLORIDE 4 MG: 4 TABLET, FILM COATED ORAL at 17:30

## 2023-11-30 RX ADMIN — LACOSAMIDE 100 MG: 10 INJECTION INTRAVENOUS at 17:32

## 2023-11-30 RX ADMIN — HYDROCODONE BITARTRATE AND ACETAMINOPHEN 1 TABLET: 7.5; 325 TABLET ORAL at 05:45

## 2023-11-30 RX ADMIN — LACOSAMIDE 100 MG: 10 INJECTION INTRAVENOUS at 05:45

## 2023-11-30 RX ADMIN — CARVEDILOL 25 MG: 25 TABLET, FILM COATED ORAL at 09:40

## 2023-11-30 RX ADMIN — ESCITALOPRAM OXALATE 10 MG: 10 TABLET, FILM COATED ORAL at 09:40

## 2023-11-30 NOTE — PROGRESS NOTES
[facility] Progress Note      Patient Name: Dee Herrera  : 1990  MRN: [id number]  Primary Care Physician:  Margarita Woods APRN  Date of admission: 10/26/2023    Subjective     Interval History:   Follow-up end-stage renal disease currently on peritoneal dialysis    The patient had tunneled dialysis catheter yesterday without any difficulty she underwent dialysis yesterday she is feeling significantly better.  No chest pain or shortness of breath, no nausea or vomiting now she is agreeable to have the PD catheter removed.    Review of Systems:   As noted above    Objective     Vitals:   Temp:  [97.5 °F (36.4 °C)-98.3 °F (36.8 °C)] 97.9 °F (36.6 °C)  Heart Rate:  [67-84] 81  Resp:  [16] 16  BP: (111-135)/() 111/87    Intake/Output Summary (Last 24 hours) at 2023 0805  Last data filed at 2023 0300  Gross per 24 hour   Intake 570 ml   Output 1000 ml   Net -430 ml       Physical Exam:    General Appearance: alert, awake, chronically ill, no acute distress   Skin: warm and dry  HEENT: Core track is in place  Neck: supple, no JVD, tunneled dialysis cath in the left IJ with exit site below the right clavicle  Lungs: Bilateral rhonchi, breathing effort not labored  Heart: RRR, normal S1 and S2, no rub  Abdomen: soft, nontender, nondistended, normoactive bowels, PD catheter in place with clean exit site no tunnel tenderness  Extremities: no edema, cyanosis or clubbing  Neuro: Moving all extremities    Scheduled Meds:     aspirin, 81 mg, Nasogastric, Daily  carvedilol, 25 mg, Nasogastric, Q12H  chlorhexidine, 15 mL, Mouth/Throat, Q12H  escitalopram, 10 mg, Nasogastric, Daily  First Mouthwash (Magic Mouthwash), 10 mL, Swish & Spit, Q6H  hydroxychloroquine, 200 mg, Oral, Daily  insulin regular, 2-7 Units, Subcutaneous, Q6H  Lacosamide, 100 mg, Intravenous, Q12H  lactulose, 10 g, Nasogastric, TID  lidocaine, 10 mL, Subcutaneous, Once  metoclopramide, 5 mg,  Intravenous, Q6H  midodrine, 2.5 mg, Oral, TID AC  mupirocin, , Each Nare, BID  mycophenolate, 250 mg, Oral, Q12H  pantoprazole, 40 mg, Intravenous, Q AM      IV Meds:   hold, 1 each  lactated ringers, 9 mL/hr, Last Rate: Stopped (11/29/23 1834)  sodium chloride, 75 mL/hr, Last Rate: 75 mL/hr (11/29/23 1042)        Results Reviewed:   I have personally reviewed the results from the time of this admission to 11/30/2023 08:05 EST     Results from last 7 days   Lab Units 11/29/23  0546 11/28/23 0526 11/27/23  0704   SODIUM mmol/L 124* 125* 123*   POTASSIUM mmol/L 4.1 3.7 4.4   CHLORIDE mmol/L 86* 85* 84*   CO2 mmol/L 24.3 27.0 25.3   BUN mg/dL 50* 55* 54*   CREATININE mg/dL 5.49* 4.97* 5.15*   CALCIUM mg/dL 8.9 8.9 9.1   GLUCOSE mg/dL 92 104* 79       Estimated Creatinine Clearance: 10.3 mL/min (A) (by C-G formula based on SCr of 5.49 mg/dL (H)).    Results from last 7 days   Lab Units 11/29/23  0546 11/28/23 1830 11/28/23 0526 11/27/23  0704   MAGNESIUM mg/dL 2.1 2.4 1.4*  --    PHOSPHORUS mg/dL 3.9  --  4.2 4.1             Results from last 7 days   Lab Units 11/30/23  0716 11/29/23  0546 11/28/23  0526 11/27/23  0704 11/26/23  0612   WBC 10*3/mm3 6.10 7.70 7.53 7.31 7.92   HEMOGLOBIN g/dL 8.3* 9.5* 9.4* 9.6* 10.2*   PLATELETS 10*3/mm3 240 225 201 203 212             Assessment / Plan     ASSESSMENT:  End-stage renal disease on peritoneal dialysis etiology of her ESRD is related to lupus nephritis.  She was switched to hemodialysis and she is feeling significantly better today's labs still pending, plan dialysis again tomorrow and remove the PD cath  Hyponatremia, sodium yesterday was 124, today's labs still pending the dialysate yesterday was prescribed to be 130 mEq/L to avoid rapid rise of sodium  Acute CVA, cortical infarct in the anterior inferior medial right frontal lobe and subacute subdural hematoma right occipital.  Ascending aortic aneurysm with subacute/chronic aortic root dissection, status postrepair  of proximal aortic aneurysm 11/2/2023.  Reexploration for cardiac tamponade on 11/6/2023.  Seizure disorder appears to be stable currently on lacosamide  Anemia of chronic kidney disease hemoglobin today is 8.3  Hypomagnesemia, resolved    PLAN:  Hemodialysis tomorrow we will use 135 mEq/L of sodium in the dialysate  Continue fluid restriction 1000 cc per 24 hours  Arrange for removal of the PD catheter  Surveillance labs    I reviewed the chart and other providers notes, I reviewed labs.  I discussed the case with the patient .  Copied text in this note has been reviewed and is accurate as of 11/30/23.       Thank you for involving us in the care of Dee Herrera.  Please feel free to call with any questions.    Isaac Diaz MD  11/30/23  08:05 [facility]    [facility]  [phone]    Please note that portions of this note were completed with a voice recognition program.

## 2023-11-30 NOTE — PROGRESS NOTES
Name: Dee Herrera ADMIT: 10/26/2023   : 1990  PCP: Margarita Woods APRN    MRN: [id number] LOS: 35 days   AGE/SEX: 33 y.o. female  ROOM: [facility]     Subjective   Subjective   Patient is seen at bedside, no new complaints.       Objective   Objective   Vital Signs  Temp:  [97.9 °F (36.6 °C)-98.6 °F (37 °C)] 98.6 °F (37 °C)  Heart Rate:  [71-84] 71  Resp:  [15-18] 18  BP: (111-133)/(87-98) 128/95  SpO2:  [91 %-100 %] 97 %  on   ;   Device (Oxygen Therapy): room air  Body mass index is 16.16 kg/m².  Physical Exam  General, awake and alert.  Ill-appearing female  Head and ENT, normocephalic and atraumatic.  Lungs, symmetric expansion, equal air entry bilaterally.  Heart, regular rate and rhythm.  Abdomen, soft and nontender.  Extremities, no clubbing or cyanosis.  Neuro, no focal deficits.  Skin: Warm and no rash.  Psych, normal mood and affect.  Musculoskeletal, joint examination is grossly normal.     Copied text material from yesterday's note has been reviewed for appropriate changes and remains accurate as of 23.    Results Review     I reviewed the patient's new clinical results.  Results from last 7 days   Lab Units 23  072346 23  0704   WBC 10*3/mm3 6.10 7.70 7.53 7.31   HEMOGLOBIN g/dL 8.3* 9.5* 9.4* 9.6*   PLATELETS 10*3/mm3 240 225 201 203     Results from last 7 days   Lab Units 23  0716 23  0546 23  0523  0704   SODIUM mmol/L 127* 124* 125* 123*   POTASSIUM mmol/L 4.5 4.1 3.7 4.4   CHLORIDE mmol/L 94* 86* 85* 84*   CO2 mmol/L 25.3 24.3 27.0 25.3   BUN mg/dL 26* 50* 55* 54*   CREATININE mg/dL 3.51* 5.49* 4.97* 5.15*   GLUCOSE mg/dL 80 92 104* 79   EGFR mL/min/1.73 17.2* 10.0* 11.3* 10.8*     Results from last 7 days   Lab Units 23  0716 23  0546 23  0526 23  0704   ALBUMIN g/dL 2.0* 2.1* 2.3* 1.9*     Results from last 7 days   Lab Units 23  0716 23  0546 23  1830 23  0526  11/27/23  0704   CALCIUM mg/dL 8.7 8.9  --  8.9 9.1   ALBUMIN g/dL 2.0* 2.1*  --  2.3* 1.9*   MAGNESIUM mg/dL 1.9 2.1 2.4 1.4*  --    PHOSPHORUS mg/dL 4.4 3.9  --  4.2 4.1       Glucose   Date/Time Value Ref Range Status   11/30/2023 1557 122 70 - 130 mg/dL Final   11/30/2023 1101 91 70 - 130 mg/dL Final   11/30/2023 0640 87 70 - 130 mg/dL Final   11/29/2023 2343 83 70 - 130 mg/dL Final   11/29/2023 1754 97 70 - 130 mg/dL Final   11/29/2023 0604 165 (H) 70 - 130 mg/dL Final   11/28/2023 2021 114 70 - 130 mg/dL Final       No radiology results for the last day    I have personally reviewed all medications:  Scheduled Medications  aspirin, 81 mg, Nasogastric, Daily  carvedilol, 25 mg, Nasogastric, Q12H  chlorhexidine, 15 mL, Mouth/Throat, Q12H  escitalopram, 10 mg, Nasogastric, Daily  First Mouthwash (Magic Mouthwash), 10 mL, Swish & Spit, Q6H  hydroxychloroquine, 200 mg, Oral, Daily  insulin regular, 2-7 Units, Subcutaneous, Q6H  Lacosamide, 100 mg, Intravenous, Q12H  lactulose, 10 g, Nasogastric, TID  lidocaine, 10 mL, Subcutaneous, Once  metoclopramide, 5 mg, Intravenous, Q6H  mupirocin, , Each Nare, BID  mycophenolate, 250 mg, Oral, Q12H  ondansetron, 4 mg, Oral, TID AC  pantoprazole, 40 mg, Intravenous, Q AM    Infusions  hold, 1 each  lactated ringers, 9 mL/hr, Last Rate: Stopped (11/29/23 1834)  sodium chloride, 75 mL/hr, Last Rate: 75 mL/hr (11/29/23 1042)    Diet  Diet: Regular/House Diet, Fluid Restriction (240 mL/tray) Diets; 1000 mL/day; Feeding Assistance - Nursing; Texture: Regular Texture (IDDSI 7); Fluid Consistency: Thin (IDDSI 0)  NPO Diet NPO Type: Strict NPO    I have personally reviewed:  [x]  Laboratory   [x]  Microbiology   [x]  Radiology   [x]  EKG/Telemetry  [x]  Cardiology/Vascular   []  Pathology    []  Records       Assessment/Plan     Active Hospital Problems    Diagnosis  POA    **Dissecting ascending aortic aneurysm [I71.010]  Yes    Recent cerebrovascular accident (CVA) [Z86.73]  Yes     Aortic valve lesion [I35.8]  Yes    Severe aortic valve regurgitation [I35.1]  Yes    Hyponatremia [E87.1]  Yes    Poor appetite [R63.0]  Yes    Moderate malnutrition [E44.0]  Yes    Chronic diastolic CHF (congestive heart failure) [I50.32]  Yes    Anemia due to chronic kidney disease, on chronic dialysis [N18.6, D63.1, Z99.2]  Not Applicable    Peritoneal dialysis catheter in place [Z99.2]  Not Applicable    Essential hypertension [I10]  Yes    End stage renal disease on dialysis [N18.6, Z99.2]  Not Applicable    Seizure disorder [G40.909]  Yes    Elevated liver function tests [R79.89]  Yes    Pericardial effusion [I31.39]  Yes    Systemic lupus erythematosus [M32.9]  Yes    Thrombocytopenia [D69.6]  Yes    Hypertension secondary to other renal disorders [I15.1]  Yes    Pancytopenia [D61.818]  Yes    Vitamin D deficiency [E55.9]  Yes      Resolved Hospital Problems    Diagnosis Date Resolved POA    Abdominal pain [R10.9] 10/28/2023 Yes       33 y.o. female admitted with Dissecting ascending aortic aneurysm.    Assessment and plan  1.  End-stage renal disease, dependent on peritoneal dialysis, plan is now to switch to hemodialysis per nephrology, vascular surgery on board.      2.  Acute CVA, seizures, status post neurology evaluation, continue current medications.     3.  Severe protein calorie malnutrition, continue nutrition.     4.  History of SLE, chronic condition.     5.  Hyponatremia, monitor sodium trend.     6.  CODE STATUS is full code.  Further plans based on hospital course.      Torres Gay MD  [facility]  11/30/23  18:07 EST

## 2023-11-30 NOTE — PROGRESS NOTES
Nutrition Services    Patient Name:  Dee Herrera  YOB: 1990  MRN: [id number]  Admit Date:  10/26/2023    Assessment Date:  11/30/23    CLINICAL NUTRITION - PROGRESS NOTE       Reason for Encounter Follow-up         Nutrition Order Diet: Regular/House Diet, Fluid Restriction (240 mL/tray) Diets; 1000 mL/day; Feeding Assistance - Nursing; Texture: Regular Texture (IDDSI 7); Fluid Consistency: Thin (IDDSI 0)  NPO Diet NPO Type: Strict NPO    Supplements/Snacks Boost Breeze TID, Boost Plus BID    EN/PN Order N/a         Pertinent Information Pt was able to tolerate toast and eggs at breakfast and then ate pasta at lunch but c/o N/V after eating lunch. Pt stated she dislikes Boost plus and Boost Breeze, will d/c. Pt agreed to try magic cups (chocolate), will order BID. Pt sitting up in bed now eating a bag of chips. S/p TDC insertion 11/29 and HD. Plans for PDC removal tomorrow. Noted GI recommends reglan, zofran and PPI. Encouraged increased po intake of meals/ONS.         Intervention/Plan D/C Boost plus and Boost Breeze.   Ordered Magic cups (dell) BID.  RD to continue to follow.     RD to follow up per protocol.    Electronically signed by:  Crystal Edwards RD  11/30/23 17:42 EST

## 2023-11-30 NOTE — NURSING NOTE
Access center follow up.    Patient resting soundly with eyes closed. Chart reviewed. Per notes patient alert and oriented x3, intermittent confusion, pleasant mood. Patient receiving supportive care per primary team. Patient denies depression. Reports feeling better, encouraged po intake. Patient participated in PT/OT today. Current medication lexapro. D/C plan to SNF. Access center following.

## 2023-11-30 NOTE — PROGRESS NOTES
Hospital Follow Up    LOS:  LOS: 35 days   Patient Name: Dee Herrera  Age/Sex: 33 y.o. female  : 1990  MRN: [id number]    Day of Service: 23   Length of Stay: 35  Encounter Provider: CAESAR Michaels  Place of Service: [facility] CARDIOLOGY  Patient Care Team:  Margarita Woods APRN as PCP - General (Nurse Practitioner)  iWnnie Sanchez MD as Referring Physician (Obstetrics and Gynecology)  Norberto Almaraz MD PhD as Consulting Physician (Hematology and Oncology)  Lupis Thomas MD as Consulting Physician (Nephrology)  Hair Mckeon MD as Consulting Physician (Nephrology)  Juana Taylor MD as Consulting Physician (Cardiology)    Subjective:     Chief Complaint: follow up aortic aneurysm/dissection, severe AR    Interval History: No complaints of chest pain or SOA this morning. TDC placed yesterday and underwent HD.  Objective:     Objective:  Temp:  [97.5 °F (36.4 °C)-98.3 °F (36.8 °C)] 97.9 °F (36.6 °C)  Heart Rate:  [67-84] 81  Resp:  [16] 16  BP: (111-135)/() 111/87     Intake/Output Summary (Last 24 hours) at 2023 0842  Last data filed at 2023 0300  Gross per 24 hour   Intake 570 ml   Output 1000 ml   Net -430 ml     Body mass index is 16.16 kg/m².      23  0546 23  1730 23  0300   Weight: 47 kg (103 lb 11.3 oz) 44 kg (97 lb) 44 kg (97 lb)     Weight change: -3.041 kg (-6 lb 11.3 oz)    Physical Exam:   General Appearance:    Awake alert and oriented, flat affect and withdrawn   Color:  Skin:  Neuro:  HEENT:    Lungs:     Pink  Warm and dry  No focal, motor or sensory deficits  Neck supple, pupils equal, round and reactive. No JVD, No Bruit  Bilateral rhonchi,respirations regular, even and                  unlabored    Heart:    Regular rate and rhythm, S1 and S2, + sys murmur, no gallop, no rub. No edema, DP/PT pulses are 2+   Chest Wall:    Midsternal incision clean and dry   Abdomen:     Normal bowel  "sounds, no masses, no organomegaly, soft        non-tender, non-distended, no guarding, no ascites noted   Extremities:   Moves all extremities well, no edema, no cyanosis, no redness       Lab Review:   Results from last 7 days   Lab Units 11/30/23  0716 11/29/23  0546   SODIUM mmol/L 127* 124*   POTASSIUM mmol/L 4.5 4.1   CHLORIDE mmol/L 94* 86*   CO2 mmol/L 25.3 24.3   BUN mg/dL 26* 50*   CREATININE mg/dL 3.51* 5.49*   GLUCOSE mg/dL 80 92   CALCIUM mg/dL 8.7 8.9           Results from last 7 days   Lab Units 11/30/23  0716 11/29/23  0546   WBC 10*3/mm3 6.10 7.70   HEMOGLOBIN g/dL 8.3* 9.5*   HEMATOCRIT % 26.7* 28.9*   PLATELETS 10*3/mm3 240 225         Results from last 7 days   Lab Units 11/30/23  0716 11/29/23  0546   MAGNESIUM mg/dL 1.9 2.1           Invalid input(s): \"LDLCALC\"              I reviewed the patient's new clinical results.  I personally viewed and interpreted the patient's EKG  Current Medications:   Scheduled Meds:aspirin, 81 mg, Nasogastric, Daily  carvedilol, 25 mg, Nasogastric, Q12H  chlorhexidine, 15 mL, Mouth/Throat, Q12H  escitalopram, 10 mg, Nasogastric, Daily  First Mouthwash (Magic Mouthwash), 10 mL, Swish & Spit, Q6H  hydroxychloroquine, 200 mg, Oral, Daily  insulin regular, 2-7 Units, Subcutaneous, Q6H  Lacosamide, 100 mg, Intravenous, Q12H  lactulose, 10 g, Nasogastric, TID  lidocaine, 10 mL, Subcutaneous, Once  metoclopramide, 5 mg, Intravenous, Q6H  midodrine, 2.5 mg, Oral, TID AC  mupirocin, , Each Nare, BID  mycophenolate, 250 mg, Oral, Q12H  pantoprazole, 40 mg, Intravenous, Q AM      Continuous Infusions:hold, 1 each  lactated ringers, 9 mL/hr, Last Rate: Stopped (11/29/23 1834)  sodium chloride, 75 mL/hr, Last Rate: 75 mL/hr (11/29/23 1042)        Allergies:  Allergies   Allergen Reactions    Minoxidil Hives and Other (See Comments)     Pericardial effusion .       Assessment:       Dissecting ascending aortic aneurysm    Vitamin D deficiency    Thrombocytopenia    " Hypertension secondary to other renal disorders    Pancytopenia    Systemic lupus erythematosus    Pericardial effusion    End stage renal disease on dialysis    Seizure disorder    Elevated liver function tests    Essential hypertension    Peritoneal dialysis catheter in place    Anemia due to chronic kidney disease, on chronic dialysis    Hyponatremia    Poor appetite    Moderate malnutrition    Chronic diastolic CHF (congestive heart failure)    Aortic valve lesion    Severe aortic valve regurgitation    Recent cerebrovascular accident (CVA)        Plan:   Ascending aortic aneurysm with subacute/chronic aortic root dissection: s/p repair and proximal aortic replacement on 11/2.   Severe aortic regurgitation: s/p repair on 11/2.   Cardiac tamponade: secondary to pericardial thrombus anteriorly and compressing right ventricle. S/p reexploration with clot removal and treatment of a small amount of bleeding at the suture line on 11/6.  Cardiogenic shock: due to #3. Resolved  Seizures: postoperatively. EEG normal.   ESRD: secondary to lupus nephritis: underwent tunnel dialysis catheter placement yesterday and HD session. Plans for PD catheter removal  Hyponatremia: resolved  HTN: blood pressure stable. On midodrine and carvedilol  Chronic anemia: stable  SLE  Depression: Access center has seen patient. At this point, she denies any depression. She was started on escitalopram.    Right MCA CVA: confirmed by MRI. Neurology expects full recovery.    -She looks much better this morning and reports feeling better, sitting up in bed eating breakfast. Coretrak had to be removed. Encouraged continued efforts at increasing PO intake  -Underwent HD yesterday and plans for HD tomorrow.  -BP is stable. She did not receive midodrine yesterday. Will stop at this point and monitor.     CAESAR Michaels  11/30/23  08:42 EST  Electronically signed by CAESAR Damon, 11/15/23, 9:03 AM EST.

## 2023-11-30 NOTE — CONSULTS
General Surgery  Consult Note    CC: Unwanted PD catheter    HPI: We have been reconsulted for removal of her peritoneal dialysis catheter now that she has switched to hemodialysis. She seems unaware of this plan but is open to having her PD catheter removed.     Vitals:    11/30/23 0900   BP: 133/98   Pulse: 71   Resp: 15   Temp: 97.9 °F (36.6 °C)   SpO2: 91%     General: no acute distress, awake and alert but pleasantly confused  HEENT: dry skin of the face, left sided tunnel catheter at base of neck dressed with sterile dressings  CV: RRR  Respiratory: clear to auscultation bilaterally, nonlabored breathing  GI: abdomen soft, nontender, nondistended, PD catheter in LLQ is capped and nontender with no surrounding cellulitis  Neuro: pleasantly confused (nursing says this is her baseline)    Assessment & Plan:  I discussed the plan with Dr. Diaz today. I will plan for PD catheter removal tomorrow in the OR, and will try to work around her hemodialysis schedule. She should remain NPO after midnight tonight.    Maryellen Ashton MD  General, Robotic, and Endoscopic Surgery  [facility]    [address], [address]  [place] [address]  P: [phone]  F: [phone]

## 2023-11-30 NOTE — THERAPY TREATMENT NOTE
Patient Name: Dee Herrera  : 1990    MRN: [id number]                              Today's Date: 2023       Admit Date: 10/26/2023    Visit Dx:     ICD-10-CM ICD-9-CM   1. Shortness of breath  R06.02 786.05   2. ESRD (end stage renal disease) on dialysis  N18.6 585.6    Z99.2 V45.11   3. Hyponatremia  E87.1 276.1   4. Generalized abdominal pain  R10.84 789.07   5. Elevated lactic acid level  R79.89 276.2   6. Elevated troponin  R79.89 790.6   7. Severe aortic valve regurgitation  I35.1 424.1   8. Pericardial effusion  I31.39 423.9   9. S/P AVR  Z95.2 V43.3   10. 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 syndrome    Systemic lupus erythematosus    Lupus nephritis, ISN/RPS class IV    Hypokalemia    Hypocalcemia    COVID-19    Hospital discharge follow-up    Stage 5 chronic kidney disease    Cardiac cirrhosis    Pancreatitis    Duodenitis    Regional enteritis of small bowel    Pericardial effusion    End stage renal disease on dialysis    Hemodialysis status    Seizure disorder    Elevated liver function tests    C. difficile colitis    Anemia, chronic disease    Essential hypertension    Peritoneal dialysis catheter in place    Anemia due to chronic kidney disease, on chronic dialysis    Alternating constipation and diarrhea    Abnormal stress test    Hyponatremia    Poor appetite    Moderate malnutrition    Chronic diastolic CHF (congestive heart failure)    Aortic valve lesion    Severe aortic valve regurgitation    Dissecting ascending aortic aneurysm    Recent cerebrovascular accident (CVA)     Past Medical  History:   Diagnosis Date    Anasarca     PER CT SCAN    Dry skin     ESRD (end stage renal disease) on dialysis     MARCO COLE, KATIE FRASER    History of abdominal pain     History of anemia     History of transfusion     Hypertension     Iron deficiency anemia 09/27/2021    Lupus (systemic lupus erythematosus) 07/30/2022    Migraine     Other specified nutritional anemias     Pericardial effusion     Renal insufficiency     Seizures     STATES LAST WAS 1/2023    Shortness of breath     OCCASIONAL    Vitamin D deficiency 09/27/2021     Past Surgical History:   Procedure Laterality Date    ASCENDING AORTIC ANEURYSM REPAIR W/ MECHANICAL AORTIC VALVE REPLACEMENT N/A 11/2/2023    Procedure: CHETNA STERNOTOMY, AORTIC ROOT REPLACEMENT WITH VALVE SPARING MISHEL PROCEDURE, REPLACEMENT OF ASCENDING AORTA, RIGHT FEMORAL DIALYSIS CATHETER PLACEMENT AND PRP;  Surgeon: Chris Medina MD;  Location: [facility] CVOR;  Service: Cardiothoracic;  Laterality: N/A;    CARDIAC CATHETERIZATION N/A 06/14/2023    Procedure: Coronary angiography;  Surgeon: Juana Taylor MD;  Location: [facility] CATH INVASIVE LOCATION;  Service: Cardiovascular;  Laterality: N/A;    CARDIAC CATHETERIZATION N/A 06/14/2023    Procedure: Left heart cath;  Surgeon: Juana Taylor MD;  Location: [facility] CATH INVASIVE LOCATION;  Service: Cardiovascular;  Laterality: N/A;    CARDIAC CATHETERIZATION N/A 06/14/2023    Procedure: Right Heart Cath;  Surgeon: Juana Taylor MD;  Location: [facility] CATH INVASIVE LOCATION;  Service: Cardiovascular;  Laterality: N/A;    COLONOSCOPY N/A 7/20/2023    Procedure: COLONOSCOPY to cecum with biopsy;  Surgeon: Drew Kaminski MD;  Location: [facility] ENDOSCOPY;  Service: Gastroenterology;  Laterality: N/A;  PRE - diarrhea, constipation  POST - fair prep, normal    CORONARY ARTERY BYPASS GRAFT N/A 11/6/2023    Procedure: STERNAL EXPLORATION AND WASH OUT;  Surgeon: Jr Mitesh Quiroz MD;  Location: [facility] CVOR;  Service:  Cardiothoracic;  Laterality: N/A;    ENDOSCOPY N/A 7/20/2023    Procedure: ESOPHAGOGASTRODUODENOSCOPY with biopsy;  Surgeon: Drew Kaminski MD;  Location: [facility] ENDOSCOPY;  Service: Gastroenterology;  Laterality: N/A;  PRE - abn ct abd  POST - gastritis    INSERTION HEMODIALYSIS CATHETER N/A 07/26/2022    Procedure: RIGHT TUNNELED DIALYSIS CATHETER PLACEMENT;  Surgeon: Diandra Adhikari MD;  Location: [facility] MAIN OR;  Service: Vascular;  Laterality: N/A;    INSERTION HEMODIALYSIS CATHETER Left 11/29/2023    Procedure: TUNNELED DIALYSIS CATHETER PLACEMENT;  Surgeon: Jose Patel II, MD;  Location: [facility] MAIN OR;  Service: Vascular;  Laterality: Left;    INSERTION PERITONEAL DIALYSIS CATHETER N/A 04/03/2023    Procedure: INSERTION PERITONEAL DIALYSIS CATHETER LAPAROSCOPIC, omentumpexy;  Surgeon: Jemal Loyola MD;  Location: [facility] MAIN OR;  Service: General;  Laterality: N/A;    TONSILLECTOMY        General Information       Row Name 11/30/23 0949          OT Time and Intention    Document Type therapy note (daily note)  -SM     Mode of Treatment occupational therapy;individual therapy  -       Row Name 11/30/23 0949          General Information    Patient Profile Reviewed yes  -SM     Existing Precautions/Restrictions fall;sternal;cardiac  -       Row Name 11/30/23 0949          Cognition    Orientation Status (Cognition) oriented x 3  -SM       Row Name 11/30/23 0949          Safety Issues, Functional Mobility    Impairments Affecting Function (Mobility) balance;cognition;endurance/activity tolerance;strength;coordination  -SM               User Key  (r) = Recorded By, (t) = Taken By, (c) = Cosigned By      Initials Name Provider Type    SM Etta Bustillos OT Occupational Therapist                     Mobility/ADL's       Row Name 11/30/23 0949          Bed Mobility    Supine-Sit [place] (Bed Mobility) contact guard  -SM     Sit-Supine [place] (Bed Mobility) verbal cues  -SM      "Assistive Device (Bed Mobility) head of bed elevated;bed rails  -SM       Row Name 11/30/23 0949          Sit-Stand Transfer    Sit-Stand [place] (Transfers) contact guard  -     Assistive Device (Sit-Stand Transfers) --  HHAX1  -SM       Row Name 11/30/23 0949          Functional Mobility    Functional Mobility- Ind. Level contact guard assist  -     Functional Mobility-Distance (Feet) --  35  -     Functional Mobility- Comment HHAX1, 1 lap around room to promote increased functional mobility for ADLs.  -SM       Row Name 11/30/23 0949          Activities of Daily Living    BADL Assessment/Intervention --  pt fatiqued from UE exercise to complete other ADLs at this time, plans to complete later with nsg. Pt also with some confusion reports \"I did that after I ate breakfast\" despite OT arriving as pt was still eating breakfast.  -SM       Row Name 11/30/23 0949          Self-Feeding Assessment/Training    [place] Level (Feeding) feeding skills;set up  -     Position (Self-Feeding) sitting up in bed  -SM       Row Name 11/30/23 0949          Lower Body Dressing Assessment/Training    [place] Level (Lower Body Dressing) doff;don;socks;standby assist  -     Position (Lower Body Dressing) edge of bed sitting  -               User Key  (r) = Recorded By, (t) = Taken By, (c) = Cosigned By      Initials Name Provider Type     Etta Bustillos OT Occupational Therapist                   Obj/Interventions       Row Name 11/30/23 1012          Shoulder (Therapeutic Exercise)    Shoulder AROM (Therapeutic Exercise) bilateral;flexion;10 repetitions;sitting  forward press  -SM       Row Name 11/30/23 1012          Elbow/Forearm (Therapeutic Exercise)    Elbow/Forearm AROM (Therapeutic Exercise) bilateral;flexion;extension;10 repetitions  -SM       Row Name 11/30/23 1012          Motor Skills    Functional Endurance poor  -SM       Row Name 11/30/23 1012          Balance    Static Sitting Balance " supervision  -     Position, Sitting Balance sitting edge of bed  -SM     Static Standing Balance contact guard  -SM     Dynamic Standing Balance contact guard  -     Position/Device Used, Standing Balance supported  -               User Key  (r) = Recorded By, (t) = Taken By, (c) = Cosigned By      Initials Name Provider Type    Etta Maya OT Occupational Therapist                   Goals/Plan    No documentation.                  Clinical Impression       Row Name 11/30/23 1014          Pain Assessment    Pretreatment Pain Rating 0/10 - no pain  -SM     Posttreatment Pain Rating 0/10 - no pain  -SM       Row Name 11/30/23 1014          Plan of Care Review    Plan of Care Reviewed With patient  -     Outcome Evaluation Pt participated in OT today, her pace remains very slow with all activites. She is generally very weak and deconditioned. She requires frequent rest between any task and fatiques quickly limiting her ability to complete a full ADL routine. She is able to change socks, sit EOB for ~20 min with good sitting balance, and complete UE ther ex prior to performing mobility in room to the chair. She reports fatique at end of session.  -       Row Name 11/30/23 1014          Therapy Plan Review/Discharge Plan (OT)    Anticipated Discharge Disposition (OT) inpatient rehabilitation facility;skilled nursing facility  -[facility] Name 11/30/23 1014          Positioning and Restraints    Pre-Treatment Position in bed  -     Post Treatment Position chair  -     In Chair reclined;encouraged to call for assist;exit alarm on;with nsg;notified nsg  -SM               User Key  (r) = Recorded By, (t) = Taken By, (c) = Cosigned By      Initials Name Provider Type    Etta Maya OT Occupational Therapist                   Outcome Measures       Row Name 11/30/23 1016          How much help from another is currently needed...    Putting on and taking off regular lower body clothing? 3   -SM     Bathing (including washing, rinsing, and drying) 2  -SM     Toileting (which includes using toilet bed pan or urinal) 3  -SM     Putting on and taking off regular upper body clothing 3  -SM     Taking care of personal grooming (such as brushing teeth) 3  -SM     Eating meals 3  -SM     AM-PAC 6 Clicks Score (OT) 17  -SM       Row Name 11/30/23 [facility]          Functional Assessment    Outcome Measure Options AM-PAC 6 Clicks Daily Activity (OT)  -               User Key  (r) = Recorded By, (t) = Taken By, (c) = Cosigned By      Initials Name Provider Type    Etta Maya OT Occupational Therapist                    Occupational Therapy Education       Title: PT OT SLP Therapies (In Progress)       Topic: Occupational Therapy (In Progress)       Point: ADL training (Done)       Description:   Instruct learner(s) on proper safety adaptation and remediation techniques during self care or transfers.   Instruct in proper use of assistive devices.                  Learning Progress Summary             Patient Acceptance, E, VU by  at 11/28/2023 1340    Comment: progress towards goals, OT POC, AROM HEP of UEs to increase endurance/strength   Family Acceptance, E, VU by  at 11/28/2023 1340    Comment: progress towards goals, OT POC, AROM HEP of UEs to increase endurance/strength                         Point: Home exercise program (Not Started)       Description:   Instruct learner(s) on appropriate technique for monitoring, assisting and/or progressing therapeutic exercises/activities.                  Learner Progress:  Not documented in this visit.              Point: Precautions (Not Started)       Description:   Instruct learner(s) on prescribed precautions during self-care and functional transfers.                  Learner Progress:  Not documented in this visit.              Point: Body mechanics (Not Started)       Description:   Instruct learner(s) on proper positioning and spine alignment during  self-care, functional mobility activities and/or exercises.                  Learner Progress:  Not documented in this visit.                              User Key       Initials Effective Dates Name Provider Type Discipline     04/02/20 -  Etta Bustillos OT Occupational Therapist OT                  OT Recommendation and Plan  Planned Therapy Interventions (OT): activity tolerance training, adaptive equipment training, BADL retraining, functional balance retraining, occupation/activity based interventions, patient/caregiver education/training, transfer/mobility retraining, strengthening exercise, ROM/therapeutic exercise  Therapy Frequency (OT): 5 times/wk  Plan of Care Review  Plan of Care Reviewed With: patient  Progress: improving  Outcome Evaluation: Pt participated in OT today, her pace remains very slow with all activites. She is generally very weak and deconditioned. She requires frequent rest between any task and fatiques quickly limiting her ability to complete a full ADL routine. She is able to change socks, sit EOB for ~20 min with good sitting balance, and complete UE ther ex prior to performing mobility in room to the chair. She reports fatique at end of session.     Time Calculation:         Time Calculation- OT       Row Name 11/30/23 1016             Time Calculation- OT    OT Start Time 0930  -      OT Stop Time 1009  -      OT Time Calculation (min) 39 min  -      Total Timed Code Minutes- OT 39 minute(s)  -SM      OT Received On 11/30/23  -      OT - Next Appointment 12/01/23  -         Timed Charges    56183 - OT Therapeutic Exercise Minutes 16  -SM      17600 - OT Self Care/Mgmt Minutes 23  -SM         Total Minutes    Timed Charges Total Minutes 39  -SM       Total Minutes 39  -SM                User Key  (r) = Recorded By, (t) = Taken By, (c) = Cosigned By      Initials Name Provider Type    Etta Maya OT Occupational Therapist                  Therapy Charges for  Today       Code Description Service Date Service Provider Modifiers Qty    74342235320 HC OT SELF CARE/MGMT/TRAIN EA 15 MIN 11/29/2023 Etta Bustillos OT GO 2    26902029029 HC OT THER PROC EA 15 MIN 11/30/2023 Etta Bustillos OT GO 1    02467611202 HC OT SELF CARE/MGMT/TRAIN EA 15 MIN 11/30/2023 Etta Bustillos OT GO 2                 Etta Bustillos OT  11/30/2023

## 2023-11-30 NOTE — CONSULTS
Gastroenterology   Initial Inpatient Consult Note    Referring Provider: Dr. Gay    Reason for Consultation: Vomiting    Subjective     History of present illness:      Thank you for allowing us to participate in the care of this patient.       33 y.o. F pt of Dr. MAZARIEGOS who we are asked to see for postprandial vomiting.  she has a significant past medical history of SLE, lupus nephritis that progressed to ESRD now on HD who presents with hypertension.  She is being evaluated at  for kidney transplant.   She initially presented to the ER on 10/26/2023 with worsening shortness of breath however admission was complicated by dissecting ascending aortic aneurysm on 10/30.    Patient and nursing staff described episode of vomiting has more of a rumination without nausea after swallowing pills and after consuming lunch.  Emesis was described as undigested food and occurring immediately after swallowing.  Current antiemetic regimen consist of metoclopramide 5 mg every 6 hours, Zofran 4 mg 3 times daily.    Denies abdominal pain.  She denies any upper GI concerns including nausea, or dysphagia.    Labs Significant for:  WBC 6.10, Hgb 8.3, platelets 240, albumin 2.0     Most Recent endoscopic evaluation:     7/20/23 EGD and CLS remarkable for:     Gastritis without evidence of h. Pylori   Colon preparation was fair however endoscopically normal appearing colon      Past Medical History:  Past Medical History:   Diagnosis Date    Anasarca     PER CT SCAN    Dry skin     ESRD (end stage renal disease) on dialysis     TUES, THURS, SAT [address]    History of abdominal pain     History of anemia     History of transfusion     Hypertension     Iron deficiency anemia 09/27/2021    Lupus (systemic lupus erythematosus) 07/30/2022    Migraine     Other specified nutritional anemias     Pericardial effusion     Renal insufficiency     Seizures     STATES LAST WAS 1/2023    Shortness of breath     OCCASIONAL    Vitamin D  deficiency 09/27/2021     Past Surgical History:  Past Surgical History:   Procedure Laterality Date    ASCENDING AORTIC ANEURYSM REPAIR W/ MECHANICAL AORTIC VALVE REPLACEMENT N/A 11/2/2023    Procedure: CHETNA STERNOTOMY, AORTIC ROOT REPLACEMENT WITH VALVE SPARING MISHEL PROCEDURE, REPLACEMENT OF ASCENDING AORTA, RIGHT FEMORAL DIALYSIS CATHETER PLACEMENT AND PRP;  Surgeon: Chris Medina MD;  Location: [facility];  Service: Cardiothoracic;  Laterality: N/A;    CARDIAC CATHETERIZATION N/A 06/14/2023    Procedure: Coronary angiography;  Surgeon: Juana Taylor MD;  Location: [facility] CATH INVASIVE LOCATION;  Service: Cardiovascular;  Laterality: N/A;    CARDIAC CATHETERIZATION N/A 06/14/2023    Procedure: Left heart cath;  Surgeon: Juana Taylor MD;  Location: [facility] CATH INVASIVE LOCATION;  Service: Cardiovascular;  Laterality: N/A;    CARDIAC CATHETERIZATION N/A 06/14/2023    Procedure: Right Heart Cath;  Surgeon: Juana Taylor MD;  Location: [facility] CATH INVASIVE LOCATION;  Service: Cardiovascular;  Laterality: N/A;    COLONOSCOPY N/A 7/20/2023    Procedure: COLONOSCOPY to cecum with biopsy;  Surgeon: Drwe Kaminski MD;  Location: [facility] ENDOSCOPY;  Service: Gastroenterology;  Laterality: N/A;  PRE - diarrhea, constipation  POST - fair prep, normal    CORONARY ARTERY BYPASS GRAFT N/A 11/6/2023    Procedure: STERNAL EXPLORATION AND WASH OUT;  Surgeon: Jr Mitesh Quiroz MD;  Location: [facility];  Service: Cardiothoracic;  Laterality: N/A;    ENDOSCOPY N/A 7/20/2023    Procedure: ESOPHAGOGASTRODUODENOSCOPY with biopsy;  Surgeon: Drew Kaminski MD;  Location: [facility] ENDOSCOPY;  Service: Gastroenterology;  Laterality: N/A;  PRE - abn ct abd  POST - gastritis    INSERTION HEMODIALYSIS CATHETER N/A 07/26/2022    Procedure: RIGHT TUNNELED DIALYSIS CATHETER PLACEMENT;  Surgeon: Diandra Adhikari MD;  Location: [facility] MAIN OR;  Service: Vascular;  Laterality: N/A;    INSERTION HEMODIALYSIS CATHETER Left  11/29/2023    Procedure: TUNNELED DIALYSIS CATHETER PLACEMENT;  Surgeon: Jose Patel II, MD;  [facility];  Service: Vascular;  Laterality: Left;    INSERTION PERITONEAL DIALYSIS CATHETER N/A 04/03/2023    Procedure: INSERTION PERITONEAL DIALYSIS CATHETER LAPAROSCOPIC, omentumpexy;  Surgeon: Jemal Loyola MD;  Location:  [facility] MAIN OR;  Service: General;  Laterality: N/A;    TONSILLECTOMY        Social History:   Social History     Tobacco Use    Smoking status: Former     Types: Cigars     Passive exposure: Past    Smokeless tobacco: Never    Tobacco comments:     Patient smoked black & mild   Substance Use Topics    Alcohol use: Yes     Comment: social      Family History:  Family History   Problem Relation Age of Onset    Autoimmune disease Mother     Anemia Mother     Diabetes Sister     Anemia Brother     Diabetes Maternal Grandmother     Hypertension Maternal Grandmother     Cancer Maternal Grandmother     Sickle cell anemia Cousin     Malig Hyperthermia Neg Hx        Home Meds:  Medications Prior to Admission   Medication Sig Dispense Refill Last Dose    carvedilol (COREG) 25 MG tablet Take 1 tablet by mouth Every 12 (Twelve) Hours. 60 tablet 0 10/25/2023    famotidine (PEPCID) 20 MG tablet Take 1 tablet by mouth Daily. 30 tablet 0 10/25/2023    hydroxychloroquine (PLAQUENIL) 200 MG tablet Take 1 tablet by mouth Daily.   10/25/2023    mycophenolate (CELLCEPT) 500 MG tablet Take 0.5 tablets by mouth Every 12 (Twelve) Hours. 30 tablet 0 10/25/2023    ondansetron (Zofran) 4 MG tablet Take 1 tablet by mouth Every 8 (Eight) Hours As Needed for Nausea or Vomiting. 30 tablet 1 10/25/2023    predniSONE (DELTASONE) 10 MG tablet Take 1 tablet by mouth Daily. 30 tablet 0 10/25/2023    sevelamer (RENVELA) 800 MG tablet Take 1 tablet by mouth 3 (Three) Times a Day With Meals.   10/25/2023    NIFEdipine XL (PROCARDIA XL) 90 MG 24 hr tablet Take 1 tablet by mouth Daily. (Patient not taking:  Reported on 10/26/2023) 30 tablet 0 Not Taking    polyethylene glycol (MIRALAX) 17 GM/SCOOP powder Take 17 g by mouth As Needed. (Patient not taking: Reported on 10/26/2023)   Not Taking    potassium chloride 10 MEQ CR tablet Take 1 tablet by mouth Daily.   More than a month    sennosides-docusate (PERICOLACE) 8.6-50 MG per tablet Take 2 tablets by mouth Daily As Needed for Constipation. (Patient not taking: Reported on 10/26/2023) 30 tablet 1 Not Taking    simethicone (MYLICON) 80 MG chewable tablet Chew 1 tablet Every 6 (Six) Hours As Needed. (Patient not taking: Reported on 10/26/2023)   Not Taking     Current Meds:   aspirin, 81 mg, Nasogastric, Daily  carvedilol, 25 mg, Nasogastric, Q12H  chlorhexidine, 15 mL, Mouth/Throat, Q12H  escitalopram, 10 mg, Nasogastric, Daily  First Mouthwash (Magic Mouthwash), 10 mL, Swish & Spit, Q6H  hydroxychloroquine, 200 mg, Oral, Daily  insulin regular, 2-7 Units, Subcutaneous, Q6H  Lacosamide, 100 mg, Intravenous, Q12H  lactulose, 10 g, Nasogastric, TID  lidocaine, 10 mL, Subcutaneous, Once  metoclopramide, 5 mg, Intravenous, Q6H  mupirocin, , Each Nare, BID  mycophenolate, 250 mg, Oral, Q12H  ondansetron, 4 mg, Oral, TID AC  pantoprazole, 40 mg, Intravenous, Q AM      Allergies:  Allergies   Allergen Reactions    Minoxidil Hives and Other (See Comments)     Pericardial effusion .     Review of Systems  Pertinent items are noted in HPI, all other systems reviewed and negative    Objective     Vital Signs  Temp:  [97.5 °F (36.4 °C)-98.1 °F (36.7 °C)] 97.9 °F (36.6 °C)  Heart Rate:  [71-84] 71  Resp:  [15-16] 15  BP: (111-133)/(73-98) 133/98    Physical Exam:  CONSTITUTIONAL:  today's vital signs reviewed  EARS NOSE THROAT: trachea midline and no deformity of the nares  EYES: no scleral icterus  GASTROINTESTINAL: abdomen is soft, nontender, nondistended with normal active bowel sounds, no masses are appreciated  PSYCHIATRIC: appropriate mood and affect  RESPIRATORY: normal  inspiratory effort with no increased work of breathing  NEUROLOGIC: patient is awake and alert  DERMATOLOGIC: skin is warm with no cyanosis  LYMPHATIC: no periumbilical lymphadenopathy     Results Review:              I reviewed the patient's new clinical results.    Results from last 7 days   Lab Units 11/30/23  0716 11/29/23  0546 11/28/23  0526   WBC 10*3/mm3 6.10 7.70 7.53   HEMOGLOBIN g/dL 8.3* 9.5* 9.4*   HEMATOCRIT % 26.7* 28.9* 29.9*   PLATELETS 10*3/mm3 240 225 201     Results from last 7 days   Lab Units 11/30/23  0716 11/29/23  0546 11/28/23  0526   SODIUM mmol/L 127* 124* 125*   POTASSIUM mmol/L 4.5 4.1 3.7   CHLORIDE mmol/L 94* 86* 85*   CO2 mmol/L 25.3 24.3 27.0   BUN mg/dL 26* 50* 55*   CREATININE mg/dL 3.51* 5.49* 4.97*   CALCIUM mg/dL 8.7 8.9 8.9   GLUCOSE mg/dL 80 92 104*         Lab Results   Lab Value Date/Time    LIPASE 42 10/26/2023 0054    LIPASE 10 (L) 09/10/2023 1901    LIPASE 22 02/15/2023 0343    LIPASE 22 02/14/2023 2355    LIPASE 47 11/25/2022 2109    LIPASE 27 11/22/2022 1059    LIPASE 32 11/21/2022 2030       Radiology:  FL C Arm During Surgery   Final Result      XR Abdomen KUB   Final Result       As described.           This report was finalized on 11/22/2023 9:17 AM by Dr. Norm Arshad M.D on Workstation: [id number]          XR Abdomen KUB   Final Result   Uppermost abdomen is outside the exam field-of-view. Enteric   tube is coiled within the stomach with tip overlying the distal   stomach/proximal duodenum. Nonobstructive bowel gas pattern. No   pneumatosis. Peritoneal dialysis catheter.               This report was finalized on 11/21/2023 2:12 PM by Dr. Stephen Lopez M.D on Workstation: [id number]          XR Chest 1 View   Final Result   Partially improved opacity at the left mid to lower lung,   continued follow-up recommended. Cardiomegaly and pulmonary vascular   congestion.       This report was finalized on 11/19/2023 7:43 AM by Dr. Norm Arshad M.D on  Workstation: [email or web address]          MRI Brain Without Contrast   Final Result   1. There is a 2.4 x 1.9 x 2 cm acute cortical infarct in the anterior   inferior medial right frontal lobe within the right KASH territory.       2. There is a thin subacute subdural hematoma overlying the posterior   and lateral right occipital lobe and focally wrapping along the superior   margin of the medial right tentorium cerebelli that measures 2 to 3 mm   in thickness and unchanged since head CT 11/04/2023 and follow-up head   CT to resolution of the subdural is warranted.       3. There appears to be some mild diffuse cerebral atrophy or volume loss   and correlate clinically as to etiology in this young 33-year-old   patient.       4. There is left sphenoid and inferior left maxillary sinus mucosal   thickening and small amount of fluid in the mastoid air cells   bilaterally. The remainder of the MRI of the brain is normal. The   results were communicated to Dr. Fernando Gamino from [facility] by   telephone 11/17/2023 at 10:00 a.m.       This report was finalized on 11/17/2023 11:35 AM by Dr. Bobby Ruelas M.D   on Workstation: [email or web address]          US Thoracentesis   Final Result   Very minimal pleural fluid, insufficient for thoracentesis               This report was finalized on 11/16/2023 10:18 AM by Dr. Payam Phillips M.D on Workstation: [id number]          XR Chest 1 View   Final Result   Worsening vascular congestion.       This report was finalized on 11/16/2023 5:16 AM by Dr. Amparo Hodges M.D on Workstation: [id number]          XR Chest PA & Lateral   Final Result   FINDINGS AND IMPRESSION:   Presumed feeding tube courses below the diaphragm and the tip is comment   of field-of-view. Suggestion of epicardial pacing wires. Median   sternotomy wires are present. Suggestion of a small to moderate size   pleural effusion posteriorly, favored to be left-sided; however, is not   definitively seen on the frontal  radiograph. Findings can be better   characterized with chest CT if clinically indicated.       Pulmonary vascular congestion with superimposed pulmonary opacification   is present within the bilateral mid to lower lungs, left greater than   right, as before. There is improved expansion within the bilateral lung   bases. No pneumothorax is seen. However, please note evaluation of the   left lung apex is suboptimal due to partial obscuration by the patient's   face and a small left apical pneumothorax cannot be excluded.. Cardiac   silhouette is accentuated by low lung volumes but appears to be at least   mildly enlarged.               This report was finalized on 11/15/2023 6:06 PM by Dr. Darren Bender M.D on Workstation: [id number]          XR Chest 1 View   Final Result   Central vascular engorgement with bilateral pleural   effusions.       This report was finalized on 11/15/2023 7:28 AM by Dr. Jose Juan Aviles M.D on Workstation: [id number]          XR Chest 1 View   Final Result   No significant interval change.       This report was finalized on 11/14/2023 5:11 AM by Dr. Amparo Hodges M.D on Workstation: [id number]          XR Chest 1 View   Final Result   Persistent vascular congestion and bilateral effusions. Left pleural   effusion may have increased when compared to prior study.       This report was finalized on 11/13/2023 4:43 AM by Dr. Amparo Hodges M.D on Workstation: [id number]          XR Chest 1 View   Final Result   Persistent vascular congestion and bibasilar atelectasis.       This report was finalized on 11/11/2023 5:38 AM by Dr. Amparo Hodges M.D on Workstation: [id number]          XR Chest 1 View   Final Result   1. No evidence of pneumothorax.   2. Mild cardiomegaly.   3. Increased density in the left base as described.           This report was finalized on 11/10/2023 11:07 AM by Dr. Clyde Rayo M.D on Workstation: [id number]          XR Chest 1 View   Final  Result      XR Chest 1 View   Final Result   No significant change.       This report was finalized on 11/9/2023 11:06 AM by Dr. Norm Arshad M.D on Workstation: [id number]          XR Chest 1 View   Final Result   The chest appears largely unchanged from yesterday's study.       This report was finalized on 11/8/2023 8:12 AM by Dr. Clyde Rayo M.D on Workstation: [id number]          XR Abdomen KUB   Final Result   1. No evidence of bowel obstruction.   2. NG tube courses into the stomach.           This report was finalized on 11/8/2023 7:30 AM by Dr. lCyde Rayo M.D on Workstation: [id number]          XR Chest 1 View   Final Result   Endotracheal tube adjusted. Otherwise, no significant   change.       This report was finalized on 11/7/2023 8:52 AM by Dr. Norm Arshad M.D on Workstation: [id number]          XR Abdomen KUB   Final Result      XR Chest 1 View   Final Result   1. Satisfactory position of life support devices.   2. No significant pneumothorax is seen.   3. Increased density in the left lung base as described. Follow-up films   recommended.       This report was finalized on 11/6/2023 6:58 PM by Dr. Clyde Rayo M.D on Workstation: [id number]          XR Chest 1 View   Final Result   As described.               This report was finalized on 11/6/2023 4:22 PM by Dr. Norm Arshad M.D on Workstation: [id number]          XR Chest 1 View   Final Result       1. Since chest x-ray earlier this morning 11/05/2023 at 3:30 a.m. there   has been removal of 2 left-sided chest tubes or mediastinal drains and   there has been exchange of the right internal jugular pulmonary artery   catheter for right internal jugular central line with its tip now in the   superior vena cava in good position. Otherwise, there has been no   significant change. The remainder of the life-support lines are in   stable and good position. There is mild enlargement of the   cardiomediastinal silhouette.  There is some atelectasis at the left lung   base and a small left effusion. No additional active disease is seen in   the chest with no pneumothorax seen.       This report was finalized on 11/6/2023 6:54 AM by Dr. Bobby Ruelas M.D   on Workstation: [email or web address]          XR Chest 1 View   Final Result   Persistent left basilar opacity.       This report was finalized on 11/5/2023 6:18 AM by Dr. Norm Arshad M.D on Workstation: [email or web address]          CT Head Without Contrast   Final Result   1. Since yesterday's head CT on 11/03/2023 at 9:45 a.m., there is a   stable thin 2 mm thick acute subdural hematoma along the right side of   the far posterior inferior falx cerebri extending along the superior   aspect of the medial right tentorium cerebelli as well as stable thin   2-3 mm thick acute subdural hematoma tracking over the posterior   inferior right parietal lobe and posterior right occipital lobe. It   exerts no mass effect. Furthermore, since yesterday's head CT there is a   decrease with near resolution of the CSF density subdural hygroma   overlying the anterolateral right frontal lobe and extending over the   superior right frontal lobe. Continued followup to resolution of the   subdural is suggested. The remainder of the head CT is unremarkable. If   the patient truly had a new onset seizure an MRI of the brain with and   without contrast is suggested to further evaluate. Results were   discussed with Dr. Guillen by telephone 11/04/2023 at 1.       Radiation dose reduction techniques were utilized, including automated   exposure control and exposure modulation based on body size.           This report was finalized on 11/5/2023 11:10 AM by Dr. Bobby Ruelas M.D   on Workstation: [email or web address]          XR Chest 1 View   Final Result   Partial improvement.               This report was finalized on 11/4/2023 6:21 AM by Dr. Norm Arshad M.D on Workstation: [email or web address]          CT Head Without Contrast    Final Result   1. There is a thin acute subdural hematoma related to the posterior   aspect of the falx and extending over the medial aspect of the tentorium   cerebelli on the right measuring approximately 2 mm in maximum   thickness. There is a low-attenuation hygroma appreciated overlying the   right frontal lobe laterally and superolaterally measuring approximately   3 mm in thickness, which is new versus 02/13/2023.    2. There is no evidence of acute infarction or of intra-axial   hemorrhage.       The above information was called to the patient's nurse at the time of   the dictation. The patient's nurse is to immediately relay the   information to the clinical service.                           Radiation dose reduction techniques were utilized, including automated   exposure control and exposure modulation based on body size.           This report was finalized on 11/3/2023 4:50 PM by Dr. Mike Ernst M.D   on Workstation: [id number]          XR Chest 1 View   Final Result   Possible tiny medial left apical pneumothorax.       This report was finalized on 11/3/2023 4:44 AM by Dr. Amparo Hodges M.D on Workstation: [id number]          XR Abdomen KUB   Final Result      XR Chest 1 View   Final Result   FINDINGS AND IMPRESSION:   Right internal jugular pulmonary artery catheter tip overlies the   mediastinum. Endotracheal tube terminates between the clavicles.   Orogastric tube courses below the diaphragm the tip is collimated out of   field-of-view. 3 thoracostomy tubes overlie the mediastinum. Mild   pulmonary opacification is present within the right lung base and left   mid and lower lung. While findings may represent atelectasis and/or   postoperative changes, multifocal pneumonia and/or pulmonary edema could   have a similar appearance in the appropriate clinical context and   correlation with patient history as well as continued attention on   follow-up is recommended. Presumed epicardial pacing  wires. There are   median sternotomy wires.       There is a questionable tiny left apical pneumothorax resulting in   approximately 3 mm of pleural-parenchymal separation. This finding was   discussed with Kizzy, the patient's nurse, by telephone by Darren Bender   at   3:05 p.m. on  11/2/2023  .       Cardiac silhouette is mildly enlarged, as before.       This report was finalized on 11/2/2023 3:07 PM by Dr. Darren Bender M.D   on Workstation: [id number]          XR Chest PA & Lateral   Final Result      CT Angiogram Chest   Final Result   1. Ascending aortic aneurysm/dissection with annuloaortic ectasia,   suggesting connective tissue disease       2. Moderate cardiomegaly       3. Small right pleural effusion       4. Intraperitoneal free gas and incompletely evaluated ascites, likely   secondary to peritoneal dialysis       5. Incidental findings as above.       These findings were discussed with Dr Bliss by Dr Acosta at the time   of dictation.       This report was finalized on 10/30/2023 9:47 AM by Dr. Nilay Acosta M.D   on Workstation: [id number]          XR Abdomen KUB   Final Result       As described.           This report was finalized on 10/26/2023 8:27 AM by Dr. Norm Arshad M.D on Workstation: [id number]          XR Chest 1 View   Final Result         Electronically signed by Amari Romero MD on 10-26-23 at 0209      FL Upper GI Single Contrast SBFT    (Results Pending)       Assessment & Plan   Active Hospital Problems    Diagnosis     **Dissecting ascending aortic aneurysm     Recent cerebrovascular accident (CVA)     Aortic valve lesion     Severe aortic valve regurgitation     Hyponatremia     Poor appetite     Moderate malnutrition     Chronic diastolic CHF (congestive heart failure)     Anemia due to chronic kidney disease, on chronic dialysis     Peritoneal dialysis catheter in place     Essential hypertension     End stage renal disease on dialysis     Seizure disorder      Elevated liver function tests     Pericardial effusion     Systemic lupus erythematosus     Thrombocytopenia     Hypertension secondary to other renal disorders     Pancytopenia     Vitamin D deficiency        Assessment:  Rumination without nausea  Dissecting ascending aortic aneurysm  Moderate malnutrition  ESRD on dialysis  Seizure disorder  Cardiogenic cirrhosis  Chronic diastolic heart failure  Gastritis    These problems are new to me    Plan:  Nursing staff to notify GI service on call if any change in clinical status.  Supportive care per primary team with antiemetics fluid hydration  Appreciate nutrition recommendations  Continue IV pantoprazole 40 mg  Continue Reglan 5 mg 4 times daily    Discussed recommendations with nursing staff and patient on recommendation to proceed with small bowel follow-through to further assess rumination  and possible underlying motility versus exacerbation and gastritis contributing to symptoms due to chronic use of oral corticosteroids.    If symptoms persist and small bowel follow-through.  Would not recommend proceeding with EGD if possible secondary to recent dissecting ascending aortic aneurysm and high risk for endoscopic evaluation.  Further recommendations to be made based on clinical course and results of pending evaluation.    I discussed the patients findings and my recommendations with patient, nursing staff, and Dr. Pittman .             CAESAR Huff  [facility] [place]  [address] [place] [address]

## 2023-11-30 NOTE — PLAN OF CARE
Goal Outcome Evaluation:  Plan of Care Reviewed With: patient           Outcome Evaluation: Pt made small improvements w/ gait distance today of req CGA and use of fww. Significantly slow gait speed noted w/ reduced step length. No overt LOB. Fatigue and weakness limited distance although pt was motivated to amb a little further today. Pt performed ther ex for strengthening then returned to bed w/ mn A to assist B LE to bed. PT will prog as pt dony. Rec SNF to address deficits.      Anticipated Discharge Disposition (PT): skilled nursing facility, inpatient rehabilitation facility

## 2023-11-30 NOTE — THERAPY TREATMENT NOTE
Patient Name: Dee Herrera  : 1990    MRN: [id number]                              Today's Date: 2023       Admit Date: 10/26/2023    Visit Dx:     ICD-10-CM ICD-9-CM   1. Shortness of breath  R06.02 786.05   2. ESRD (end stage renal disease) on dialysis  N18.6 585.6    Z99.2 V45.11   3. Hyponatremia  E87.1 276.1   4. Generalized abdominal pain  R10.84 789.07   5. Elevated lactic acid level  R79.89 276.2   6. Elevated troponin  R79.89 790.6   7. Severe aortic valve regurgitation  I35.1 424.1   8. Pericardial effusion  I31.39 423.9   9. S/P AVR  Z95.2 V43.3   10. 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 syndrome    Systemic lupus erythematosus    Lupus nephritis, ISN/RPS class IV    Hypokalemia    Hypocalcemia    COVID-19    Hospital discharge follow-up    Stage 5 chronic kidney disease    Cardiac cirrhosis    Pancreatitis    Duodenitis    Regional enteritis of small bowel    Pericardial effusion    End stage renal disease on dialysis    Hemodialysis status    Seizure disorder    Elevated liver function tests    C. difficile colitis    Anemia, chronic disease    Essential hypertension    Peritoneal dialysis catheter in place    Anemia due to chronic kidney disease, on chronic dialysis    Alternating constipation and diarrhea    Abnormal stress test    Hyponatremia    Poor appetite    Moderate malnutrition    Chronic diastolic CHF (congestive heart failure)    Aortic valve lesion    Severe aortic valve regurgitation    Dissecting ascending aortic aneurysm    Recent cerebrovascular accident (CVA)     Past Medical  History:   Diagnosis Date    Anasarca     PER CT SCAN    Dry skin     ESRD (end stage renal disease) on dialysis     MARCO COLE, KATIE FRASER    History of abdominal pain     History of anemia     History of transfusion     Hypertension     Iron deficiency anemia 09/27/2021    Lupus (systemic lupus erythematosus) 07/30/2022    Migraine     Other specified nutritional anemias     Pericardial effusion     Renal insufficiency     Seizures     STATES LAST WAS 1/2023    Shortness of breath     OCCASIONAL    Vitamin D deficiency 09/27/2021     Past Surgical History:   Procedure Laterality Date    ASCENDING AORTIC ANEURYSM REPAIR W/ MECHANICAL AORTIC VALVE REPLACEMENT N/A 11/2/2023    Procedure: CHETNA STERNOTOMY, AORTIC ROOT REPLACEMENT WITH VALVE SPARING MISHEL PROCEDURE, REPLACEMENT OF ASCENDING AORTA, RIGHT FEMORAL DIALYSIS CATHETER PLACEMENT AND PRP;  Surgeon: Chris Medina MD;  Location: [facility] CVOR;  Service: Cardiothoracic;  Laterality: N/A;    CARDIAC CATHETERIZATION N/A 06/14/2023    Procedure: Coronary angiography;  Surgeon: Juana Taylor MD;  Location: [facility] CATH INVASIVE LOCATION;  Service: Cardiovascular;  Laterality: N/A;    CARDIAC CATHETERIZATION N/A 06/14/2023    Procedure: Left heart cath;  Surgeon: Juana Taylor MD;  Location: [facility] CATH INVASIVE LOCATION;  Service: Cardiovascular;  Laterality: N/A;    CARDIAC CATHETERIZATION N/A 06/14/2023    Procedure: Right Heart Cath;  Surgeon: Juana Taylor MD;  Location: [facility] CATH INVASIVE LOCATION;  Service: Cardiovascular;  Laterality: N/A;    COLONOSCOPY N/A 7/20/2023    Procedure: COLONOSCOPY to cecum with biopsy;  Surgeon: Drew Kaminski MD;  Location: [facility] ENDOSCOPY;  Service: Gastroenterology;  Laterality: N/A;  PRE - diarrhea, constipation  POST - fair prep, normal    CORONARY ARTERY BYPASS GRAFT N/A 11/6/2023    Procedure: STERNAL EXPLORATION AND WASH OUT;  Surgeon: Jr Mitesh Quiroz MD;  Location: [facility] CVOR;  Service:  Cardiothoracic;  Laterality: N/A;    ENDOSCOPY N/A 7/20/2023    Procedure: ESOPHAGOGASTRODUODENOSCOPY with biopsy;  Surgeon: Drew Kaminski MD;  Location: [facility] ENDOSCOPY;  Service: Gastroenterology;  Laterality: N/A;  PRE - abn ct abd  POST - gastritis    INSERTION HEMODIALYSIS CATHETER N/A 07/26/2022    Procedure: RIGHT TUNNELED DIALYSIS CATHETER PLACEMENT;  Surgeon: Diandra Adhikari MD;  Location: [facility] MAIN OR;  Service: Vascular;  Laterality: N/A;    INSERTION HEMODIALYSIS CATHETER Left 11/29/2023    Procedure: TUNNELED DIALYSIS CATHETER PLACEMENT;  Surgeon: Jose Patel II, MD;  Location: [facility] MAIN OR;  Service: Vascular;  Laterality: Left;    INSERTION PERITONEAL DIALYSIS CATHETER N/A 04/03/2023    Procedure: INSERTION PERITONEAL DIALYSIS CATHETER LAPAROSCOPIC, omentumpexy;  Surgeon: Jemal Loyola MD;  Location: [facility] MAIN OR;  Service: General;  Laterality: N/A;    TONSILLECTOMY        General Information       Row Name 11/30/23 [facility]5          Physical Therapy Time and Intention    Document Type therapy note (daily note)  -PH     Mode of Treatment physical therapy  -       Row Name 11/30/23 [facility]0          General Information    Existing Precautions/Restrictions fall;sternal;cardiac  -PH     Barriers to Rehab medically complex;previous functional deficit  -PH       Row Name 11/30/23 9692          Cognition    Orientation Status (Cognition) oriented x 3  -       Row Name 11/30/23 [facility]          Safety Issues, Functional Mobility    Impairments Affecting Function (Mobility) balance;endurance/activity tolerance;strength  -     Comment, Safety Issues/Impairments (Mobility) gt belt and non skid socks donned; very weak and fatigues quickly  -PH               User Key  (r) = Recorded By, (t) = Taken By, (c) = Cosigned By      Initials Name Provider Type    PH Vero Erwin PTA Physical Therapist Assistant                   Mobility       Row Name 11/30/23 6577          Bed  Mobility    Bed Mobility sit-supine  -PH     Sit-Supine [place] (Bed Mobility) minimum assist (75% patient effort);verbal cues;nonverbal cues (demo/gesture)  -PH     Comment, (Bed Mobility) min A to assist B LE to bed  -PH       Row Name 11/30/23 4222          Sit-Stand Transfer    Sit-Stand [place] (Transfers) contact guard;verbal cues  -PH     Assistive Device (Sit-Stand Transfers) walker, front-wheeled  -PH     Comment, (Sit-Stand Transfer) STS from chair to fww  -PH       Row Name 11/30/23 1358          Gait/Stairs (Locomotion)    [place] Level (Gait) contact guard;verbal cues  -PH     Assistive Device (Gait) walker, front-wheeled  -PH     Distance in Feet (Gait) 50'+  -PH     Deviations/Abnormal Patterns (Gait) amrita decreased;gait speed decreased;stride length decreased;base of support, narrow  -PH     Bilateral Gait Deviations heel strike decreased;forward flexed posture  -PH     [place] Level (Stairs) not tested  -PH     Comment, (Gait/Stairs) very slow w/ reduced step length. Incr distance; no overt LOB; fatigue and weakness limited although pt motivated to go further today  -PH               User Key  (r) = Recorded By, (t) = Taken By, (c) = Cosigned By      Initials Name Provider Type    PH Vero Erwin PTA Physical Therapist Assistant                   Obj/Interventions       Row Name 11/30/23 9327          Motor Skills    Therapeutic Exercise other (see comments)  BAP, LAQ, seated march; x 10 reps  -PH       Row Name 11/30/23 6824          Balance    Balance Assessment sitting static balance;sitting dynamic balance;standing static balance  -PH     Static Sitting Balance supervision  -PH     Dynamic Sitting Balance supervision  -PH     Static Standing Balance contact guard  -PH     Position/Device Used, Standing Balance walker, front-wheeled  -PH     Comment, Balance no overt LOB  -PH               User Key  (r) = Recorded By, (t) = Taken By, (c) = Cosigned By       Initials Name Provider Type     Vero Erwin PTA Physical Therapist Assistant                   Goals/Plan    No documentation.                  Clinical Impression       Row Name 11/30/23 1356          Pain    Pretreatment Pain Rating 0/10 - no pain  -PH     Posttreatment Pain Rating 0/10 - no pain  -PH     Pre/Posttreatment Pain Comment nausea w/ emesis bag accompanying during session  -PH     Pain Intervention(s) Repositioned;Rest  -PH       Row Name 11/30/23 1356          Plan of Care Review    Plan of Care Reviewed With patient  -PH     Outcome Evaluation Pt made small improvements w/ gait distance today of req CGA and use of fww. Significantly slow gait speed noted w/ reduced step length. No overt LOB. Fatigue and weakness limited distance although pt was motivated to amb a little further today. Pt performed ther ex for strengthening then returned to bed w/ mn A to assist B LE to bed. PT will prog as pt dony. Rec SNF to address deficits.  -PH       Row Name 11/30/23 1356          Vital Signs    O2 Delivery Pre Treatment room air  -PH     O2 Delivery Intra Treatment room air  -PH     O2 Delivery Post Treatment room air  -PH       Row Name 11/30/23 1356          Positioning and Restraints    Pre-Treatment Position sitting in chair/recliner  -PH     Post Treatment Position bed  -PH     In Bed fowlers;call light within reach;encouraged to call for assist;exit alarm on;notified [facility]  -               User Key  (r) = Recorded By, (t) = Taken By, (c) = Cosigned By      Initials Name Provider Type     Vero Erwin PTA Physical Therapist Assistant                   Outcome Measures       Row Name 11/30/23 1359 11/30/23 0940       How much help from another person do you currently need...    Turning from your back to your side while in flat bed without using bedrails? 4  -PH 4  -DS    Moving from lying on back to sitting on the side of a flat bed without bedrails? 4  -PH 4  -DS    Moving to and  from a bed to a chair (including a wheelchair)? 3  -PH 2  -DS    Standing up from a chair using your arms (e.g., wheelchair, bedside chair)? 3  -PH 2  -DS    Climbing 3-5 steps with a railing? 2  -PH 2  -DS    To walk in hospital room? 3  -PH 2  -DS    AM-PAC 6 Clicks Score (PT) 19  -PH 16  -DS    Highest Level of Mobility Goal 6 --> Walk 10 steps or more  -PH 5 --> Static standing  -DS      Row Name 11/30/23 1359 11/30/23 1016       Functional Assessment    Outcome Measure Options AM-PAC 6 Clicks Basic Mobility (PT)  -PH AM-PAC 6 Clicks Daily Activity (OT)  -              User Key  (r) = Recorded By, (t) = Taken By, (c) = Cosigned By      Initials Name Provider Type    Abi Main, RN Registered Nurse    Vero Lara PTA Physical Therapist Assistant    Etta Maya, OT Occupational Therapist                                 Physical Therapy Education       Title: PT OT SLP Therapies (In Progress)       Topic: Physical Therapy (Done)       Point: Mobility training (Done)       Learning Progress Summary             Patient Acceptance, E,TB,D, VU,DU by  at 11/30/2023 1359    Acceptance, E, DU by JY at 11/28/2023 1448    Acceptance, E,TB,D, VU,NR by  at 11/24/2023 1535    Acceptance, E, VU by SK at 11/21/2023 1609    Acceptance, E,TB,D, VU by SK at 11/20/2023 1237    Acceptance, E, NR by  at 11/19/2023 1424    Acceptance, E,TB,D, VU by SK at 11/17/2023 1633    Acceptance, E,D, NR by PC at 11/16/2023 1618    Acceptance, E,TB,D, VU by SK at 11/15/2023 1521    Acceptance, E, VU,NR by SK at 11/13/2023 1247    Acceptance, E,TB,D, VU,NR by  at 11/12/2023 1148   Family Acceptance, E, VU by SK at 11/21/2023 1609                         Point: Home exercise program (Done)       Learning Progress Summary             Patient Acceptance, E,TB,D, VU,DU by  at 11/30/2023 1359    Acceptance, ANDREW DUNHAM by JY at 11/28/2023 1448    Acceptance, WILY DUNHAM D, VU, NR by EE at 11/24/2023 1535    Acceptance, CONY DUNHAM  by SK at 11/21/2023 1609    Acceptance, E,TB,D, VU by SK at 11/20/2023 1237    Acceptance, E, NR by  at 11/19/2023 1424    Acceptance, E,TB,D, VU by SK at 11/17/2023 1633    Acceptance, E,D, NR by  at 11/16/2023 1618    Acceptance, E,TB,D, VU by SK at 11/15/2023 1521    Acceptance, E,TB,D, VU,NR by  at 11/12/2023 1148   Family Acceptance, E, VU by SK at 11/21/2023 1609                         Point: Body mechanics (Done)       Learning Progress Summary             Patient Acceptance, E,TB,D, VU,DU by  at 11/30/2023 1359    Acceptance, E, DU by JDRAKE at 11/28/2023 1448    Acceptance, E,TB,D, VU,NR by  at 11/24/2023 1535    Acceptance, E, VU by SK at 11/21/2023 1609    Acceptance, E,TB,D, VU by SK at 11/20/2023 1237    Acceptance, E, NR by  at 11/19/2023 1424    Acceptance, E,TB,D, VU by SK at 11/17/2023 1633    Acceptance, E,D, NR by  at 11/16/2023 1618    Acceptance, E,TB,D, VU by SK at 11/15/2023 1521    Acceptance, E, VU,NR by SK at 11/13/2023 1247    Acceptance, E,TB,D, VU,NR by  at 11/12/2023 1148   Family Acceptance, E, VU by SK at 11/21/2023 1609                         Point: Precautions (Done)       Learning Progress Summary             Patient Acceptance, E,TB,D, VU,DU by  at 11/30/2023 1359    Acceptance, E, DU by JY at 11/28/2023 1448    Acceptance, E, VU by SK at 11/21/2023 1609    Acceptance, E,TB,D, VU by SK at 11/20/2023 1237    Acceptance, E, NR by  at 11/19/2023 1424    Acceptance, E,TB,D, VU by SK at 11/17/2023 1633    Acceptance, E,D, NR by  at 11/16/2023 1618    Acceptance, E,TB,D, VU by SK at 11/15/2023 1521    Acceptance, E, VU,NR by SK at 11/13/2023 1247    Acceptance, E,TB,D, VU,NR by  at 11/12/2023 1148   Family Acceptance, E, VU by SK at 11/21/2023 1609                                         User Key       Initials Effective Dates Name Provider Type Discipline     06/16/21 -  Kim Almendarez, PT Physical Therapist PT     06/16/21 -  Elba Whaley, PT Physical  Therapist PT    EE 06/16/21 -  Shea Ceja, PT Physical Therapist PT    PH 06/16/21 -  Vero Erwin PTA Physical Therapist Assistant PT     08/25/23 -  Nae Chaudhary, RN Registered Nurse Nurse    SK 10/10/23 -  Supriya Harris, PT Student PT Student PT    JY 11/08/23 -  Blake Garcia, PTA Student PTA Student PT                  PT Recommendation and Plan     Plan of Care Reviewed With: patient  Outcome Evaluation: Pt made small improvements w/ gait distance today of req CGA and use of fww. Significantly slow gait speed noted w/ reduced step length. No overt LOB. Fatigue and weakness limited distance although pt was motivated to amb a little further today. Pt performed ther ex for strengthening then returned to bed w/ mn A to assist B LE to bed. PT will prog as pt dony. Rec SNF to address deficits.     Time Calculation:         PT Charges       Row Name 11/30/23 1400             Time Calculation    Start Time 1308  -PH      Stop Time 1325  -PH      Time Calculation (min) 17 min  -PH      PT Received On 11/30/23  -PH      PT - Next Appointment 12/01/23  -PH         Timed Charges    22458 - PT Therapeutic Activity Minutes 17  -PH         Total Minutes    Timed Charges Total Minutes 17  -PH       Total Minutes 17  -PH                User Key  (r) = Recorded By, (t) = Taken By, (c) = Cosigned By      Initials Name Provider Type     Vero Erwin PTA Physical Therapist Assistant                  Therapy Charges for Today       Code Description Service Date Service Provider Modifiers Qty    [id number]  PT THERAPEUTIC ACT EA 15 MIN 11/30/2023 Vero Erwin PTA GP 1            PT G-Codes  Outcome Measure Options: AM-PAC 6 Clicks Basic Mobility (PT)  AM-PAC 6 Clicks Score (PT): 19  AM-PAC 6 Clicks Score (OT): 17  Modified Page Scale: 4 - Moderately severe disability.  Unable to walk without assistance, and unable to attend to own bodily needs without assistance.  PT Discharge  Summary  Anticipated Discharge Disposition (PT): skilled nursing facility, inpatient rehabilitation facility    Vero Erwin, PTA  11/30/2023

## 2023-11-30 NOTE — PROGRESS NOTES
Name: Dee Herrera ADMIT: 10/26/2023   : 1990  PCP: Margarita Woods APRN    MRN: [id number] LOS: 35 days   AGE/SEX: 33 y.o. female  ROOM:  [address]     CC: POD#1 s/p TDC placment  Interval History: catheter worked well for dialysis.   Subjective   Subjective     Review of Systems  Objective   Objective     Vitals:   Temp:  [97.5 °F (36.4 °C)-98.1 °F (36.7 °C)] 97.9 °F (36.6 °C)  Heart Rate:  [67-84] 71  Resp:  [15-16] 15  BP: (111-135)/(73-98) 133/98    No intake/output data recorded.    Scheduled Meds:     aspirin, 81 mg, Nasogastric, Daily  carvedilol, 25 mg, Nasogastric, Q12H  chlorhexidine, 15 mL, Mouth/Throat, Q12H  escitalopram, 10 mg, Nasogastric, Daily  First Mouthwash (Magic Mouthwash), 10 mL, Swish & Spit, Q6H  hydroxychloroquine, 200 mg, Oral, Daily  insulin regular, 2-7 Units, Subcutaneous, Q6H  Lacosamide, 100 mg, Intravenous, Q12H  lactulose, 10 g, Nasogastric, TID  lidocaine, 10 mL, Subcutaneous, Once  metoclopramide, 5 mg, Intravenous, Q6H  mupirocin, , Each Nare, BID  mycophenolate, 250 mg, Oral, Q12H  pantoprazole, 40 mg, Intravenous, Q AM      IV Meds:   hold, 1 each  lactated ringers, 9 mL/hr, Last Rate: Stopped (23 1834)  sodium chloride, 75 mL/hr, Last Rate: 75 mL/hr (23 1042)        Physical Exam  L IJ TDC in place with dressings intact. No erythema, induration, drainage.   No upper extremity edema    Data Reviewed:  CBC    Results from last 7 days   Lab Units 23  0716 23  0546 23  0526 23  0704 23  0612 23  0552 23  0556   WBC 10*3/mm3 6.10 7.70 7.53 7.31 7.92 8.69 10.59   HEMOGLOBIN g/dL 8.3* 9.5* 9.4* 9.6* 10.2* 9.4* 9.6*   PLATELETS 10*3/mm3 240 225 201 203 212 182 200     BMP   Results from last 7 days   Lab Units 23  0716 23  0546 23  1830 23  0526 23  0704 23  0612 23  0552 23  0556   SODIUM mmol/L 127* 124*  --  125* 123* 127* 126* 127*   POTASSIUM mmol/L 4.5  "4.1  --  3.7 4.4 3.2* 3.5 3.6   CHLORIDE mmol/L 94* 86*  --  85* 84* 85* 86* 87*   CO2 mmol/L 25.3 24.3  --  27.0 25.3 29.0 28.5 29.8*   BUN mg/dL 26* 50*  --  55* 54* 56* 48* 49*   CREATININE mg/dL 3.51* 5.49*  --  4.97* 5.15* 4.58* 4.78* 4.56*   GLUCOSE mg/dL 80 92  --  104* 79 112* 106* 107*   MAGNESIUM mg/dL 1.9 2.1 2.4 1.4*  --   --   --  1.7   PHOSPHORUS mg/dL 4.4 3.9  --  4.2 4.1 2.1* 2.7 2.6     Cr Clearance Estimated Creatinine Clearance: 16.1 mL/min (A) (by C-G formula based on SCr of 3.51 mg/dL (H)).  Coag     HbA1C   Lab Results   Component Value Date    HGBA1C 5.10 10/30/2023    HGBA1C 3.9 (L) 03/20/2023    HGBA1C 3.9 (L) 01/26/2023     Blood Glucose   Glucose   Date/Time Value Ref Range Status   11/30/2023 1101 91 70 - 130 mg/dL Final   11/30/2023 0640 87 70 - 130 mg/dL Final   11/29/2023 2343 83 70 - 130 mg/dL Final   11/29/2023 1754 97 70 - 130 mg/dL Final   11/29/2023 0604 165 (H) 70 - 130 mg/dL Final   11/28/2023 2021 114 70 - 130 mg/dL Final   11/28/2023 1112 207 (H) 70 - 130 mg/dL Final   11/28/2023 0617 91 70 - 130 mg/dL Final     Infection     CMP   Results from last 7 days   Lab Units 11/30/23  0716 11/29/23  0546 11/28/23  0526 11/27/23  0704 11/26/23  0612 11/25/23  0552 11/24/23  0556   SODIUM mmol/L 127* 124* 125* 123* 127* 126* 127*   POTASSIUM mmol/L 4.5 4.1 3.7 4.4 3.2* 3.5 3.6   CHLORIDE mmol/L 94* 86* 85* 84* 85* 86* 87*   CO2 mmol/L 25.3 24.3 27.0 25.3 29.0 28.5 29.8*   BUN mg/dL 26* 50* 55* 54* 56* 48* 49*   CREATININE mg/dL 3.51* 5.49* 4.97* 5.15* 4.58* 4.78* 4.56*   GLUCOSE mg/dL 80 92 104* 79 112* 106* 107*   ALBUMIN g/dL 2.0* 2.1* 2.3* 1.9* 2.5* 2.0* 2.1*     ABG      UA      GIDEON  No results found for: \"POCMETH\", \"POCAMPHET\", \"POCBARBITUR\", \"POCBENZO\", \"POCCOCAINE\", \"POCOPIATES\", \"POCOXYCODO\", \"POCPHENCYC\", \"POCPROPOXY\", \"POCTHC\", \"POCTRICYC\"  Lysis Labs   Results from last 7 days   Lab Units 11/30/23  0716 11/29/23  0546 11/28/23  0526 11/27/23  0704 11/26/23  0612 " 23  0552 23  0556   HEMOGLOBIN g/dL 8.3* 9.5* 9.4* 9.6* 10.2* 9.4* 9.6*   PLATELETS 10*3/mm3 240 225 201 203 212 182 200   CREATININE mg/dL 3.51* 5.49* 4.97* 5.15* 4.58* 4.78* 4.56*     Radiology(recent) No radiology results for the last day    Most notable findings include: Venous duplex showed subacute R IJ DVT.     Active Hospital Problems:   Active Hospital Problems    Diagnosis  POA    **Dissecting ascending aortic aneurysm [I71.010]  Yes    Recent cerebrovascular accident (CVA) [Z86.73]  Yes    Aortic valve lesion [I35.8]  Yes    Severe aortic valve regurgitation [I35.1]  Yes    Hyponatremia [E87.1]  Yes    Poor appetite [R63.0]  Yes    Moderate malnutrition [E44.0]  Yes    Chronic diastolic CHF (congestive heart failure) [I50.32]  Yes    Anemia due to chronic kidney disease, on chronic dialysis [N18.6, D63.1, Z99.2]  Not Applicable    Peritoneal dialysis catheter in place [Z99.2]  Not Applicable    Essential hypertension [I10]  Yes    End stage renal disease on dialysis [N18.6, Z99.2]  Not Applicable    Seizure disorder [G40.909]  Yes    Elevated liver function tests [R79.89]  Yes    Pericardial effusion [I31.39]  Yes    Systemic lupus erythematosus [M32.9]  Yes    Thrombocytopenia [D69.6]  Yes    Hypertension secondary to other renal disorders [I15.1]  Yes    Pancytopenia [D61.818]  Yes    Vitamin D deficiency [E55.9]  Yes      Resolved Hospital Problems    Diagnosis Date Resolved POA    Abdominal pain [R10.9] 10/28/2023 Yes      Assessment & Plan   Billin, Post Op Global  Assessment / Plan     33 year old lady doing well POD#1 s/p R IJ tunneled dialysis catheter placement.   Noted to have R IJ DVT at time of catheter placement, confirmed with duplex.   I recommend 6 months anticoagulation when OK with other services.   Will see patient as needed for now. Please do not hesitate to call with any questions or concerns.       Jose Patel II, MD  23  12:49 EST  Office Number [phone]  [phone]

## 2023-11-30 NOTE — PLAN OF CARE
Goal Outcome Evaluation:  Plan of Care Reviewed With: patient        Progress: improving  Outcome Evaluation: Pt participated in OT today, her pace remains very slow with all activites. She is generally very weak and deconditioned. She requires frequent rest between any task and fatiques quickly limiting her ability to complete a full ADL routine. She is able to change socks, sit EOB for ~20 min with good sitting balance, and complete UE ther ex prior to performing mobility in room to the chair. She reports fatique at end of session.      Anticipated Discharge Disposition (OT): inpatient rehabilitation facility, skilled nursing facility

## 2023-11-30 NOTE — PROGRESS NOTES
" LOS: 35 days   Patient Care Team:  Margarita Woods APRN as PCP - General (Nurse Practitioner)  Winnie Sanchez MD as Referring Physician (Obstetrics and Gynecology)  Norberto Almaraz MD PhD as Consulting Physician (Hematology and Oncology)  Lupis Thomas MD as Consulting Physician (Nephrology)  Hair Mckeon MD as Consulting Physician (Nephrology)  Juana Taylor MD as Consulting Physician (Cardiology)    Chief Complaint: Post-op    Subjective:  Symptoms:  Stable.  No shortness of breath, cough or chest pain.    Diet:  Poor intake (Cortrak had to be removed--refusing to allow it to be replaced. Also refusing PEG placement.).  No nausea or vomiting.    Activity level: Impaired due to weakness.    Pain:  She complains of pain that is mild.  She reports pain is unchanged.  Pain is well controlled.  (Intermittent abdominal cramping).       Vital Signs  Temp:  [97.5 °F (36.4 °C)-98.3 °F (36.8 °C)] 97.9 °F (36.6 °C)  Heart Rate:  [67-84] 81  Resp:  [16] 16  BP: (111-135)/() 111/87  Body mass index is 16.16 kg/m².    Intake/Output Summary (Last 24 hours) at 11/30/2023 0740  Last data filed at 11/30/2023 0300  Gross per 24 hour   Intake 570 ml   Output 1000 ml   Net -430 ml     No intake/output data recorded.          11/29/23  0546 11/29/23  1730 11/30/23  0300   Weight: 47 kg (103 lb 11.3 oz) 44 kg (97 lb) 44 kg (97 lb)     Objective:  General Appearance:  Comfortable and in no acute distress.    Vital signs: (most recent): Blood pressure 133/98, pulse 71, temperature 97.9 °F (36.6 °C), temperature source Oral, resp. rate 15, height 165 cm (64.96\"), weight 44 kg (97 lb), last menstrual period 08/10/2022, SpO2 91%, not currently breastfeeding.  Vital signs are normal.  No fever.    Output: No urine output and producing stool.    HEENT: (NG tube in place)    Lungs:  Normal effort and normal respiratory rate.  There are decreased breath sounds.    Heart: Normal rate.  Regular rhythm.  (SR on tele " "monitor)  Abdomen: Abdomen is soft and non-distended.  Bowel sounds are normal.     Pulses: Distal pulses are intact.    Neurological: Patient is oriented to person, place and time.  (drowsy).    Skin:  Warm and dry.  (Sternal wound healing well without erythema or drainage)          Results Review:        WBC WBC   Date Value Ref Range Status   11/29/2023 7.70 3.40 - 10.80 10*3/mm3 Final   11/28/2023 7.53 3.40 - 10.80 10*3/mm3 Final      HGB Hemoglobin   Date Value Ref Range Status   11/29/2023 9.5 (L) 12.0 - 15.9 g/dL Final   11/28/2023 9.4 (L) 12.0 - 15.9 g/dL Final      HCT Hematocrit   Date Value Ref Range Status   11/29/2023 28.9 (L) 34.0 - 46.6 % Final   11/28/2023 29.9 (L) 34.0 - 46.6 % Final      Platelets Platelets   Date Value Ref Range Status   11/29/2023 225 140 - 450 10*3/mm3 Final   11/28/2023 201 140 - 450 10*3/mm3 Final        PT/INR:  No results found for: \"PROTIME\"/No results found for: \"INR\"    Sodium Sodium   Date Value Ref Range Status   11/29/2023 124 (L) 136 - 145 mmol/L Final   11/28/2023 125 (L) 136 - 145 mmol/L Final      Potassium Potassium   Date Value Ref Range Status   11/29/2023 4.1 3.5 - 5.2 mmol/L Final   11/28/2023 3.7 3.5 - 5.2 mmol/L Final      Chloride Chloride   Date Value Ref Range Status   11/29/2023 86 (L) 98 - 107 mmol/L Final   11/28/2023 85 (L) 98 - 107 mmol/L Final      Bicarbonate CO2   Date Value Ref Range Status   11/29/2023 24.3 22.0 - 29.0 mmol/L Final   11/28/2023 27.0 22.0 - 29.0 mmol/L Final      BUN BUN   Date Value Ref Range Status   11/29/2023 50 (H) 6 - 20 mg/dL Final   11/28/2023 55 (H) 6 - 20 mg/dL Final      Creatinine Creatinine   Date Value Ref Range Status   11/29/2023 5.49 (H) 0.57 - 1.00 mg/dL Final   11/28/2023 4.97 (H) 0.57 - 1.00 mg/dL Final      Calcium Calcium   Date Value Ref Range Status   11/29/2023 8.9 8.6 - 10.5 mg/dL Final   11/28/2023 8.9 8.6 - 10.5 mg/dL Final      Magnesium Magnesium   Date Value Ref Range Status   11/29/2023 2.1 1.6 - " 2.6 mg/dL Final   11/28/2023 2.4 1.6 - 2.6 mg/dL Final   11/28/2023 1.4 (L) 1.6 - 2.6 mg/dL Final            aspirin, 81 mg, Nasogastric, Daily  carvedilol, 25 mg, Nasogastric, Q12H  chlorhexidine, 15 mL, Mouth/Throat, Q12H  escitalopram, 10 mg, Nasogastric, Daily  First Mouthwash (Magic Mouthwash), 10 mL, Swish & Spit, Q6H  hydroxychloroquine, 200 mg, Oral, Daily  insulin regular, 2-7 Units, Subcutaneous, Q6H  Lacosamide, 100 mg, Intravenous, Q12H  lactulose, 10 g, Nasogastric, TID  lidocaine, 10 mL, Subcutaneous, Once  metoclopramide, 5 mg, Intravenous, Q6H  midodrine, 2.5 mg, Oral, TID AC  mupirocin, , Each Nare, BID  mycophenolate, 250 mg, Oral, Q12H  pantoprazole, 40 mg, Intravenous, Q AM      hold, 1 each  lactated ringers, 9 mL/hr, Last Rate: Stopped (11/29/23 1834)  sodium chloride, 75 mL/hr, Last Rate: 75 mL/hr (11/29/23 1042)      Assessment & Plan  - Ascending aortic aneurysm with dissection- s/p ascending aortic dissection repair/proximal replacement, gacron interposition graft, kelli procedure; insertion of right femoral shiley---POD#25; Dr. Medina  - Cardiac tamponade- reexploration for bleeding, removal of large clot compressing the RV---POD#20; Dr. Lowe  - severe aortic valve insufficiency  - Encephalopathy, improving   - ESRD on PD  - Lupus--Cellcept/plaquenil restarted 11/23 and patient seems to be improving  - Epilepsy  - chronic immunosuppression  - hypertension  - chronic anemia--stable  - TCP--chronic; improving  - Depression--started on lexapro 11/14  - right MCA CVA--continue ASA per neuro  - hyponatremia--hypervolemic, improving--nephrology following  - malnutrition r/t decreased caloric intake--nutrition following  - hypokalemia--resolved, potassium 4.4  - S/p TDC placement, 11/20    Looks good; sitting up. States she just vomited but denies any other recent nausea/vomiting. States her swallow is OK and her appetite has improved.  S/p TDC placement yesterday, had HD   Found to have  a right arm DVT--ok to start eliquis per Dr. Medina  On room air  HR/BP stable  Paint sternal incision with betadine daily.  Top of midsternal incision is scabbed but stable, without evidence of erythema or drainage.  Continue aggressive PT/OT.  D/c plan TBD--SNF vs. Rehab.  Continue supportive care    Patient declines PEG. Dr. Medina requests that we do not move forward with placing PEG until it has been discussed with him.      Shea Monteiro, APRN  11/30/23  07:40 EST

## 2023-12-01 ENCOUNTER — ANESTHESIA EVENT (OUTPATIENT)
Dept: PERIOP | Facility: HOSPITAL | Age: 33
End: 2023-12-01
Payer: MEDICARE

## 2023-12-01 ENCOUNTER — ANESTHESIA (OUTPATIENT)
Dept: PERIOP | Facility: HOSPITAL | Age: 33
End: 2023-12-01
Payer: MEDICARE

## 2023-12-01 LAB
ALBUMIN SERPL-MCNC: 2.1 G/DL (ref 3.5–5.2)
ANION GAP SERPL CALCULATED.3IONS-SCNC: 12.6 MMOL/L (ref 5–15)
BASOPHILS # BLD AUTO: 0.02 10*3/MM3 (ref 0–0.2)
BASOPHILS NFR BLD AUTO: 0.3 % (ref 0–1.5)
BUN SERPL-MCNC: 37 MG/DL (ref 6–20)
BUN/CREAT SERPL: 6.9 (ref 7–25)
CALCIUM SPEC-SCNC: 8.9 MG/DL (ref 8.6–10.5)
CHLORIDE SERPL-SCNC: 91 MMOL/L (ref 98–107)
CO2 SERPL-SCNC: 24.4 MMOL/L (ref 22–29)
CREAT SERPL-MCNC: 5.4 MG/DL (ref 0.57–1)
DEPRECATED RDW RBC AUTO: 43.4 FL (ref 37–54)
EGFRCR SERPLBLD CKD-EPI 2021: 10.2 ML/MIN/1.73
EOSINOPHIL # BLD AUTO: 0.1 10*3/MM3 (ref 0–0.4)
EOSINOPHIL NFR BLD AUTO: 1.4 % (ref 0.3–6.2)
ERYTHROCYTE [DISTWIDTH] IN BLOOD BY AUTOMATED COUNT: 14.3 % (ref 12.3–15.4)
GLUCOSE BLDC GLUCOMTR-MCNC: 81 MG/DL (ref 70–130)
GLUCOSE BLDC GLUCOMTR-MCNC: 84 MG/DL (ref 70–130)
GLUCOSE BLDC GLUCOMTR-MCNC: 85 MG/DL (ref 70–130)
GLUCOSE SERPL-MCNC: 73 MG/DL (ref 65–99)
HCG SERPL QL: NEGATIVE
HCT VFR BLD AUTO: 26.1 % (ref 34–46.6)
HGB BLD-MCNC: 8.5 G/DL (ref 12–15.9)
IMM GRANULOCYTES # BLD AUTO: 0.08 10*3/MM3 (ref 0–0.05)
IMM GRANULOCYTES NFR BLD AUTO: 1.1 % (ref 0–0.5)
LYMPHOCYTES # BLD AUTO: 0.59 10*3/MM3 (ref 0.7–3.1)
LYMPHOCYTES NFR BLD AUTO: 8 % (ref 19.6–45.3)
MCH RBC QN AUTO: 27.4 PG (ref 26.6–33)
MCHC RBC AUTO-ENTMCNC: 32.6 G/DL (ref 31.5–35.7)
MCV RBC AUTO: 84.2 FL (ref 79–97)
MONOCYTES # BLD AUTO: 0.61 10*3/MM3 (ref 0.1–0.9)
MONOCYTES NFR BLD AUTO: 8.2 % (ref 5–12)
NEUTROPHILS NFR BLD AUTO: 6 10*3/MM3 (ref 1.7–7)
NEUTROPHILS NFR BLD AUTO: 81 % (ref 42.7–76)
NRBC BLD AUTO-RTO: 0 /100 WBC (ref 0–0.2)
PHOSPHATE SERPL-MCNC: 4.9 MG/DL (ref 2.5–4.5)
PLATELET # BLD AUTO: 238 10*3/MM3 (ref 140–450)
PMV BLD AUTO: 10.7 FL (ref 6–12)
POTASSIUM SERPL-SCNC: 5.5 MMOL/L (ref 3.5–5.2)
RBC # BLD AUTO: 3.1 10*6/MM3 (ref 3.77–5.28)
SODIUM SERPL-SCNC: 128 MMOL/L (ref 136–145)
WBC NRBC COR # BLD AUTO: 7.4 10*3/MM3 (ref 3.4–10.8)

## 2023-12-01 PROCEDURE — 80069 RENAL FUNCTION PANEL: CPT | Performed by: INTERNAL MEDICINE

## 2023-12-01 PROCEDURE — 25010000002 PROPOFOL 10 MG/ML EMULSION: Performed by: NURSE ANESTHETIST, CERTIFIED REGISTERED

## 2023-12-01 PROCEDURE — 82948 REAGENT STRIP/BLOOD GLUCOSE: CPT

## 2023-12-01 PROCEDURE — C9254 INJECTION, LACOSAMIDE: HCPCS | Performed by: SURGERY

## 2023-12-01 PROCEDURE — 25010000002 ONDANSETRON PER 1 MG: Performed by: NURSE ANESTHETIST, CERTIFIED REGISTERED

## 2023-12-01 PROCEDURE — 85025 COMPLETE CBC W/AUTO DIFF WBC: CPT | Performed by: INTERNAL MEDICINE

## 2023-12-01 PROCEDURE — 99232 SBSQ HOSP IP/OBS MODERATE 35: CPT

## 2023-12-01 PROCEDURE — 0WPG03Z REMOVAL OF INFUSION DEVICE FROM PERITONEAL CAVITY, OPEN APPROACH: ICD-10-PCS | Performed by: SURGERY

## 2023-12-01 PROCEDURE — C9254 INJECTION, LACOSAMIDE: HCPCS | Performed by: STUDENT IN AN ORGANIZED HEALTH CARE EDUCATION/TRAINING PROGRAM

## 2023-12-01 PROCEDURE — 25010000002 CEFAZOLIN IN DEXTROSE 2-4 GM/100ML-% SOLUTION: Performed by: SURGERY

## 2023-12-01 PROCEDURE — 84703 CHORIONIC GONADOTROPIN ASSAY: CPT | Performed by: SURGERY

## 2023-12-01 PROCEDURE — 25010000002 METOCLOPRAMIDE PER 10 MG: Performed by: SURGERY

## 2023-12-01 PROCEDURE — 99232 SBSQ HOSP IP/OBS MODERATE 35: CPT | Performed by: INTERNAL MEDICINE

## 2023-12-01 PROCEDURE — 25010000002 LACOSAMIDE 200 MG/20ML SOLUTION: Performed by: STUDENT IN AN ORGANIZED HEALTH CARE EDUCATION/TRAINING PROGRAM

## 2023-12-01 PROCEDURE — 25810000003 SODIUM CHLORIDE 0.9 % SOLUTION: Performed by: STUDENT IN AN ORGANIZED HEALTH CARE EDUCATION/TRAINING PROGRAM

## 2023-12-01 PROCEDURE — 25010000002 DEXAMETHASONE SODIUM PHOSPHATE 20 MG/5ML SOLUTION: Performed by: NURSE ANESTHETIST, CERTIFIED REGISTERED

## 2023-12-01 PROCEDURE — 25010000002 HEPARIN (PORCINE) PER 1000 UNITS: Performed by: SURGERY

## 2023-12-01 PROCEDURE — 25010000002 LACOSAMIDE 200 MG/20ML SOLUTION: Performed by: SURGERY

## 2023-12-01 PROCEDURE — 49422 REMOVE TUNNELED IP CATH: CPT | Performed by: SURGERY

## 2023-12-01 PROCEDURE — 25010000002 PROPOFOL 200 MG/20ML EMULSION: Performed by: NURSE ANESTHETIST, CERTIFIED REGISTERED

## 2023-12-01 RX ORDER — SODIUM CHLORIDE 0.9 % (FLUSH) 0.9 %
3-10 SYRINGE (ML) INJECTION AS NEEDED
Status: DISCONTINUED | OUTPATIENT
Start: 2023-12-01 | End: 2023-12-01 | Stop reason: HOSPADM

## 2023-12-01 RX ORDER — ONDANSETRON 2 MG/ML
4 INJECTION INTRAMUSCULAR; INTRAVENOUS ONCE AS NEEDED
Status: DISCONTINUED | OUTPATIENT
Start: 2023-12-01 | End: 2023-12-01 | Stop reason: HOSPADM

## 2023-12-01 RX ORDER — HYDRALAZINE HYDROCHLORIDE 20 MG/ML
5 INJECTION INTRAMUSCULAR; INTRAVENOUS
Status: DISCONTINUED | OUTPATIENT
Start: 2023-12-01 | End: 2023-12-01 | Stop reason: HOSPADM

## 2023-12-01 RX ORDER — FLUMAZENIL 0.1 MG/ML
0.2 INJECTION INTRAVENOUS AS NEEDED
Status: DISCONTINUED | OUTPATIENT
Start: 2023-12-01 | End: 2023-12-01 | Stop reason: HOSPADM

## 2023-12-01 RX ORDER — HYDROMORPHONE HYDROCHLORIDE 1 MG/ML
0.5 INJECTION, SOLUTION INTRAMUSCULAR; INTRAVENOUS; SUBCUTANEOUS
Status: DISCONTINUED | OUTPATIENT
Start: 2023-12-01 | End: 2023-12-01 | Stop reason: HOSPADM

## 2023-12-01 RX ORDER — PROPOFOL 10 MG/ML
INJECTION, EMULSION INTRAVENOUS AS NEEDED
Status: DISCONTINUED | OUTPATIENT
Start: 2023-12-01 | End: 2023-12-01 | Stop reason: SURG

## 2023-12-01 RX ORDER — LIDOCAINE HYDROCHLORIDE 10 MG/ML
0.5 INJECTION, SOLUTION INFILTRATION; PERINEURAL ONCE AS NEEDED
Status: DISCONTINUED | OUTPATIENT
Start: 2023-12-01 | End: 2023-12-01 | Stop reason: HOSPADM

## 2023-12-01 RX ORDER — DEXAMETHASONE SODIUM PHOSPHATE 4 MG/ML
INJECTION, SOLUTION INTRA-ARTICULAR; INTRALESIONAL; INTRAMUSCULAR; INTRAVENOUS; SOFT TISSUE AS NEEDED
Status: DISCONTINUED | OUTPATIENT
Start: 2023-12-01 | End: 2023-12-01 | Stop reason: SURG

## 2023-12-01 RX ORDER — ONDANSETRON 2 MG/ML
INJECTION INTRAMUSCULAR; INTRAVENOUS AS NEEDED
Status: DISCONTINUED | OUTPATIENT
Start: 2023-12-01 | End: 2023-12-01 | Stop reason: SURG

## 2023-12-01 RX ORDER — DROPERIDOL 2.5 MG/ML
0.62 INJECTION, SOLUTION INTRAMUSCULAR; INTRAVENOUS
Status: DISCONTINUED | OUTPATIENT
Start: 2023-12-01 | End: 2023-12-01 | Stop reason: HOSPADM

## 2023-12-01 RX ORDER — CEFAZOLIN SODIUM 2 G/100ML
2000 INJECTION, SOLUTION INTRAVENOUS ONCE
Status: COMPLETED | OUTPATIENT
Start: 2023-12-01 | End: 2023-12-01

## 2023-12-01 RX ORDER — NALOXONE HCL 0.4 MG/ML
0.2 VIAL (ML) INJECTION AS NEEDED
Status: DISCONTINUED | OUTPATIENT
Start: 2023-12-01 | End: 2023-12-01 | Stop reason: HOSPADM

## 2023-12-01 RX ORDER — FENTANYL CITRATE 50 UG/ML
50 INJECTION, SOLUTION INTRAMUSCULAR; INTRAVENOUS
Status: DISCONTINUED | OUTPATIENT
Start: 2023-12-01 | End: 2023-12-01 | Stop reason: HOSPADM

## 2023-12-01 RX ORDER — OXYCODONE AND ACETAMINOPHEN 7.5; 325 MG/1; MG/1
1 TABLET ORAL EVERY 4 HOURS PRN
Status: DISCONTINUED | OUTPATIENT
Start: 2023-12-01 | End: 2023-12-01 | Stop reason: HOSPADM

## 2023-12-01 RX ORDER — PROMETHAZINE HYDROCHLORIDE 25 MG/1
25 SUPPOSITORY RECTAL ONCE AS NEEDED
Status: DISCONTINUED | OUTPATIENT
Start: 2023-12-01 | End: 2023-12-01 | Stop reason: HOSPADM

## 2023-12-01 RX ORDER — BUPIVACAINE HYDROCHLORIDE AND EPINEPHRINE 5; 5 MG/ML; UG/ML
INJECTION, SOLUTION EPIDURAL; INTRACAUDAL; PERINEURAL AS NEEDED
Status: DISCONTINUED | OUTPATIENT
Start: 2023-12-01 | End: 2023-12-01 | Stop reason: HOSPADM

## 2023-12-01 RX ORDER — MAGNESIUM HYDROXIDE 1200 MG/15ML
LIQUID ORAL AS NEEDED
Status: DISCONTINUED | OUTPATIENT
Start: 2023-12-01 | End: 2023-12-01 | Stop reason: HOSPADM

## 2023-12-01 RX ORDER — PROMETHAZINE HYDROCHLORIDE 25 MG/1
25 TABLET ORAL ONCE AS NEEDED
Status: DISCONTINUED | OUTPATIENT
Start: 2023-12-01 | End: 2023-12-01 | Stop reason: HOSPADM

## 2023-12-01 RX ORDER — SODIUM CHLORIDE 0.9 % (FLUSH) 0.9 %
3 SYRINGE (ML) INJECTION EVERY 12 HOURS SCHEDULED
Status: DISCONTINUED | OUTPATIENT
Start: 2023-12-01 | End: 2023-12-01 | Stop reason: HOSPADM

## 2023-12-01 RX ORDER — HYDROCODONE BITARTRATE AND ACETAMINOPHEN 5; 325 MG/1; MG/1
1 TABLET ORAL ONCE AS NEEDED
Status: DISCONTINUED | OUTPATIENT
Start: 2023-12-01 | End: 2023-12-01 | Stop reason: HOSPADM

## 2023-12-01 RX ORDER — IPRATROPIUM BROMIDE AND ALBUTEROL SULFATE 2.5; .5 MG/3ML; MG/3ML
3 SOLUTION RESPIRATORY (INHALATION) ONCE AS NEEDED
Status: DISCONTINUED | OUTPATIENT
Start: 2023-12-01 | End: 2023-12-01 | Stop reason: HOSPADM

## 2023-12-01 RX ORDER — LIDOCAINE HYDROCHLORIDE 20 MG/ML
INJECTION, SOLUTION INFILTRATION; PERINEURAL AS NEEDED
Status: DISCONTINUED | OUTPATIENT
Start: 2023-12-01 | End: 2023-12-01 | Stop reason: SURG

## 2023-12-01 RX ORDER — LABETALOL HYDROCHLORIDE 5 MG/ML
5 INJECTION, SOLUTION INTRAVENOUS
Status: DISCONTINUED | OUTPATIENT
Start: 2023-12-01 | End: 2023-12-01 | Stop reason: HOSPADM

## 2023-12-01 RX ORDER — EPHEDRINE SULFATE 50 MG/ML
5 INJECTION, SOLUTION INTRAVENOUS ONCE AS NEEDED
Status: DISCONTINUED | OUTPATIENT
Start: 2023-12-01 | End: 2023-12-01 | Stop reason: HOSPADM

## 2023-12-01 RX ORDER — DIPHENHYDRAMINE HYDROCHLORIDE 50 MG/ML
12.5 INJECTION INTRAMUSCULAR; INTRAVENOUS
Status: DISCONTINUED | OUTPATIENT
Start: 2023-12-01 | End: 2023-12-01 | Stop reason: HOSPADM

## 2023-12-01 RX ORDER — FAMOTIDINE 10 MG/ML
20 INJECTION, SOLUTION INTRAVENOUS ONCE
Status: COMPLETED | OUTPATIENT
Start: 2023-12-01 | End: 2023-12-01

## 2023-12-01 RX ADMIN — HYDROCODONE BITARTRATE AND ACETAMINOPHEN 1 TABLET: 5; 325 TABLET ORAL at 17:37

## 2023-12-01 RX ADMIN — PROPOFOL 100 MG: 10 INJECTION, EMULSION INTRAVENOUS at 10:58

## 2023-12-01 RX ADMIN — CARVEDILOL 25 MG: 25 TABLET, FILM COATED ORAL at 22:55

## 2023-12-01 RX ADMIN — METOCLOPRAMIDE 5 MG: 5 INJECTION, SOLUTION INTRAMUSCULAR; INTRAVENOUS at 13:48

## 2023-12-01 RX ADMIN — LIDOCAINE HYDROCHLORIDE 100 MG: 20 INJECTION, SOLUTION INFILTRATION; PERINEURAL at 10:41

## 2023-12-01 RX ADMIN — ONDANSETRON 4 MG: 2 INJECTION INTRAMUSCULAR; INTRAVENOUS at 10:41

## 2023-12-01 RX ADMIN — HEPARIN SODIUM 3000 UNITS: 1000 INJECTION INTRAVENOUS; SUBCUTANEOUS at 15:25

## 2023-12-01 RX ADMIN — PROPOFOL 80 MCG/KG/MIN: 10 INJECTION, EMULSION INTRAVENOUS at 10:41

## 2023-12-01 RX ADMIN — LACOSAMIDE 100 MG: 10 INJECTION INTRAVENOUS at 05:52

## 2023-12-01 RX ADMIN — SODIUM CHLORIDE 75 ML/HR: 9 INJECTION, SOLUTION INTRAVENOUS at 09:11

## 2023-12-01 RX ADMIN — CEFAZOLIN SODIUM 2000 MG: 2 INJECTION, SOLUTION INTRAVENOUS at 10:28

## 2023-12-01 RX ADMIN — Medication 3 ML: at 09:25

## 2023-12-01 RX ADMIN — CARVEDILOL 25 MG: 25 TABLET, FILM COATED ORAL at 07:53

## 2023-12-01 RX ADMIN — LACOSAMIDE 100 MG: 10 INJECTION INTRAVENOUS at 22:55

## 2023-12-01 RX ADMIN — DEXAMETHASONE SODIUM PHOSPHATE 8 MG: 4 INJECTION, SOLUTION INTRAMUSCULAR; INTRAVENOUS at 10:41

## 2023-12-01 RX ADMIN — FAMOTIDINE 20 MG: 10 INJECTION INTRAVENOUS at 09:19

## 2023-12-01 NOTE — PROGRESS NOTES
Patient in OR for PD catheter removal. We will see again on Monday unless needed otherwise. She remains stable from a CV standpoint. Please call with questions/concerns.    Electronically signed by CAESAR Alonzo, 12/01/23, 8:59 AM EST.

## 2023-12-01 NOTE — ANESTHESIA PREPROCEDURE EVALUATION
Anesthesia Evaluation     no history of anesthetic complications:                Airway   Mallampati: II  Comment: Evaluation limited  Dental      Pulmonary    (+) a smoker Former,shortness of breath  Cardiovascular     (+) hypertension, valvular problems/murmurs AI, CABG, CHF , pericardial effusion      Neuro/Psych  (+) seizures, headaches  (-) dizziness/light headedness  GI/Hepatic/Renal/Endo    (+) liver disease, renal disease (K 5.5)-    Musculoskeletal     Abdominal    Substance History      OB/GYN          Other   autoimmune disease lupus,                   Anesthesia Plan    ASA 4     MAC     intravenous induction     Anesthetic plan, risks, benefits, and alternatives have been provided, discussed and informed consent has been obtained with: patient.    CODE STATUS:    Code Status (Patient has no pulse and is not breathing): CPR (Attempt to Resuscitate)  Medical Interventions (Patient has pulse or is breathing): Full Support

## 2023-12-01 NOTE — PROGRESS NOTES
Gastroenterology   Inpatient Progress Note    Reason for Follow Up: Vomiting    Subjective  Interval History:   Nursing staff confirms no further episodes of vomiting overnight.  Patient drowsy though responds to questions appropriately after undergoing surgical removal of peritoneal dialysis catheter earlier today.  No new concerns.  Denies nausea.    Current Facility-Administered Medications:     acetaminophen (TYLENOL) tablet 650 mg, 650 mg, Oral, Q4H PRN, 650 mg at 11/28/23 0403 **OR** acetaminophen (TYLENOL) 160 MG/5ML oral solution 650 mg, 650 mg, Oral, Q4H PRN, 650 mg at 11/27/23 2216 **OR** acetaminophen (TYLENOL) suppository 650 mg, 650 mg, Rectal, Q4H PRN, Maryellen Ashton MD    [START ON 12/2/2023] apixaban (ELIQUIS) tablet 5 mg, 5 mg, Oral, Q12H, Maryellen Ashton MD    aspirin chewable tablet 81 mg, 81 mg, Nasogastric, Daily, Maryellen Ashton MD, 81 mg at 11/30/23 0940    bisacodyl (DULCOLAX) EC tablet 10 mg, 10 mg, Oral, Daily PRN, Maryellen Ashton MD, 10 mg at 11/08/23 2052    bisacodyl (DULCOLAX) suppository 10 mg, 10 mg, Rectal, Daily PRN, Maryellen Ashton MD    carvedilol (COREG) tablet 25 mg, 25 mg, Nasogastric, Q12H, Maryellen Ashton MD, 25 mg at 12/01/23 0753    chlorhexidine (PERIDEX) 0.12 % solution 15 mL, 15 mL, Mouth/Throat, Q12H, Maryellen Ashton MD, 15 mL at 11/22/23 0820    dextrose (D50W) (25 g/50 mL) IV injection 25 g, 25 g, Intravenous, Q15 Min PRN, Maryellen Ashton MD, 25 g at 11/08/23 0020    dextrose (GLUTOSE) oral gel 15 g, 15 g, Oral, Q15 Min PRN, Maryellen Ashton MD    diphenhydrAMINE (BENADRYL) injection 12.5 mg, 12.5 mg, Intravenous, Q15 Min PRN, Maryellen Ashton MD    droperidol (INAPSINE) injection 0.625 mg, 0.625 mg, Intravenous, Q20 Min PRN **OR** droperidol (INAPSINE) injection 0.625 mg, 0.625 mg, Intramuscular, Q20 Min PRN, Maryellen Ashton MD    ePHEDrine injection 5 mg, 5 mg, Intravenous, Once PRN, Maryellen Ashton MD    escitalopram  (LEXAPRO) tablet 10 mg, 10 mg, Nasogastric, Daily, Maryellen Ashton MD, 10 mg at 11/30/23 0940    fentaNYL citrate (PF) (SUBLIMAZE) injection 25 mcg, 25 mcg, Intravenous, Q5 Min PRN, Maryellen Ashton MD    First Mouthwash (Magic Mouthwash) 10 mL, 10 mL, Swish & Spit, Q6H, Maryellen Ashton MD, 10 mL at 11/22/23 1630    flumazenil (ROMAZICON) injection 0.2 mg, 0.2 mg, Intravenous, PRN, Maryellen Ashton MD    glucagon (GLUCAGEN) injection 1 mg, 1 mg, Intramuscular, Q15 Min PRN, Maryellen Ashton MD    heparin (porcine) injection 3,000 Units, 3,000 Units, Intracatheter, PRN, Maryellen Ashton MD, 3,000 Units at 11/13/23 1255    hydrALAZINE (APRESOLINE) injection 5 mg, 5 mg, Intravenous, Q10 Min PRN, Maryellen Ashton MD    HYDROcodone-acetaminophen (NORCO) 5-325 MG per tablet 1 tablet, 1 tablet, Oral, Once PRN, Maryellen Ashton MD    HYDROcodone-acetaminophen (NORCO) 7.5-325 MG per tablet 1 tablet, 1 tablet, Oral, Q4H PRN, Maryellen Ashton MD, 1 tablet at 11/30/23 0545    HYDROmorphone (DILAUDID) injection 0.25 mg, 0.25 mg, Intravenous, Q5 Min PRN, Maryellen Ashton MD    hydroxychloroquine (PLAQUENIL) tablet 200 mg, 200 mg, Oral, Daily, Maryellen Ashton MD, 200 mg at 11/30/23 0940    insulin regular (humuLIN R,novoLIN R) injection 2-7 Units, 2-7 Units, Subcutaneous, Q6H, Maryellen Ashton MD, 3 Units at 11/28/23 1723    ipratropium-albuterol (DUO-NEB) nebulizer solution 3 mL, 3 mL, Nebulization, Q4H PRN, Maryellen Ashton MD, 3 mL at 11/13/23 0711    ipratropium-albuterol (DUO-NEB) nebulizer solution 3 mL, 3 mL, Nebulization, Once PRN, Maryellen Ashton MD    labetalol (NORMODYNE,TRANDATE) injection 5 mg, 5 mg, Intravenous, Q5 Min PRN, Maryellen Ashton MD    lacosamide (VIMPAT) injection 100 mg, 100 mg, Intravenous, Q12H, Maryellen Ashton MD, 100 mg at 12/01/23 0552    lactulose (CHRONULAC) 10 GM/15ML solution 10 g, 10 g, Nasogastric, TID, Maryellen Ashton MD, 10 g at  23 1731    lidocaine (XYLOCAINE) 1 % injection 10 mL, 10 mL, Subcutaneous, Once, Maryellen Ashton MD    metoclopramide (REGLAN) injection 5 mg, 5 mg, Intravenous, Q6H, Maryellen Ashton MD, 5 mg at 23 1348    mupirocin (BACTROBAN) 2 % nasal ointment, , Each Nare, BID, Maryellen Ashton MD, Given at 23 1000    mycophenolate (CELLCEPT) tablet 250 mg, 250 mg, Oral, Q12H, Maryellen Ashton MD, 250 mg at 23 0941    naloxone (NARCAN) injection 0.2 mg, 0.2 mg, Intravenous, PRN, Maryellen Ashton MD    [] morphine injection 1 mg, 1 mg, Intravenous, Q4H PRN, 1 mg at 23 1132 **AND** naloxone (NARCAN) injection 0.4 mg, 0.4 mg, Intravenous, Q5 Min PRN, Maryellen Ashton MD    nitroglycerin (NITROSTAT) SL tablet 0.4 mg, 0.4 mg, Sublingual, Q5 Min PRN, Maryellen Ashton MD, 0.4 mg at 23 1714    ondansetron (ZOFRAN) injection 4 mg, 4 mg, Intravenous, Q6H PRN, Maryellen Ashton MD, 4 mg at 23 2234    ondansetron (ZOFRAN) injection 4 mg, 4 mg, Intravenous, Once PRN, Maryellen Ashton MD    ondansetron (ZOFRAN) tablet 4 mg, 4 mg, Oral, TID AC, Maryellen Ashton MD, 4 mg at 23 1730    pantoprazole (PROTONIX) injection 40 mg, 40 mg, Intravenous, Q AM, Maryellen Ashton MD, 40 mg at 23 0600    polyethylene glycol (MIRALAX) packet 17 g, 17 g, Oral, Daily PRN, Maryellen Ashton MD    promethazine (PHENERGAN) suppository 25 mg, 25 mg, Rectal, Once PRN **OR** promethazine (PHENERGAN) tablet 25 mg, 25 mg, Oral, Once PRN, Maryellen Ashton MD    sodium chloride 0.9 % infusion, 75 mL/hr, Intravenous, Continuous, Maryellen Ashton MD, Last Rate: 75 mL/hr at 23 1035, Currently Infusing at 23 1035  Review of Systems:               The following systems were reviewed and negative;  constitution and gastrointestinal    Objective     Vital Signs  Temp:  [98 °F (36.7 °C)-98.8 °F (37.1 °C)] 98.2 °F (36.8 °C)  Heart Rate:  [64-92] 64  Resp:  [16-20]  20  BP: (121-172)/() 131/97  Body mass index is 16.27 kg/m².                  General Appearance:  in no acute distress, cooperative, and appears frail  Abdomen:  Soft, nontender with gentle palpation avoiding, surgical site, normal bowel sounds; no bruits, organomegaly or masses.                Results Review:                I reviewed the patient's new clinical results.    Results from last 7 days   Lab Units 12/01/23  0643 11/30/23  0716 11/29/23  0546   WBC 10*3/mm3 7.40 6.10 7.70   HEMOGLOBIN g/dL 8.5* 8.3* 9.5*   HEMATOCRIT % 26.1* 26.7* 28.9*   PLATELETS 10*3/mm3 238 240 225     Results from last 7 days   Lab Units 12/01/23  0643 11/30/23  0716 11/29/23  0546   SODIUM mmol/L 128* 127* 124*   POTASSIUM mmol/L 5.5* 4.5 4.1   CHLORIDE mmol/L 91* 94* 86*   CO2 mmol/L 24.4 25.3 24.3   BUN mg/dL 37* 26* 50*   CREATININE mg/dL 5.40* 3.51* 5.49*   CALCIUM mg/dL 8.9 8.7 8.9   GLUCOSE mg/dL 73 80 92         Lab Results   Lab Value Date/Time    LIPASE 42 10/26/2023 0054    LIPASE 10 (L) 09/10/2023 1901    LIPASE 22 02/15/2023 0343    LIPASE 22 02/14/2023 2355    LIPASE 47 11/25/2022 2109    LIPASE 27 11/22/2022 1059       Radiology:  FL C Arm During Surgery   Final Result      XR Abdomen KUB   Final Result       As described.           This report was finalized on 11/22/2023 9:17 AM by Dr. Norm Arshad M.D on Workstation: [id number]          XR Abdomen KUB   Final Result   Uppermost abdomen is outside the exam field-of-view. Enteric   tube is coiled within the stomach with tip overlying the distal   stomach/proximal duodenum. Nonobstructive bowel gas pattern. No   pneumatosis. Peritoneal dialysis catheter.               This report was finalized on 11/21/2023 2:12 PM by Dr. Stephen Lopez M.D on Workstation: [id number]          XR Chest 1 View   Final Result   Partially improved opacity at the left mid to lower lung,   continued follow-up recommended. Cardiomegaly and pulmonary vascular   congestion.        This report was finalized on 11/19/2023 7:43 AM by Dr. Norm Arshad M.D on Workstation: [email or web address]          MRI Brain Without Contrast   Final Result   1. There is a 2.4 x 1.9 x 2 cm acute cortical infarct in the anterior   inferior medial right frontal lobe within the right KASH territory.       2. There is a thin subacute subdural hematoma overlying the posterior   and lateral right occipital lobe and focally wrapping along the superior   margin of the medial right tentorium cerebelli that measures 2 to 3 mm   in thickness and unchanged since head CT 11/04/2023 and follow-up head   CT to resolution of the subdural is warranted.       3. There appears to be some mild diffuse cerebral atrophy or volume loss   and correlate clinically as to etiology in this young 33-year-old   patient.       4. There is left sphenoid and inferior left maxillary sinus mucosal   thickening and small amount of fluid in the mastoid air cells   bilaterally. The remainder of the MRI of the brain is normal. The   results were communicated to Dr. Fernando Gamino from [facility]roke Neurology by   telephone 11/17/2023 at 10:00 a.m.       This report was finalized on 11/17/2023 11:35 AM by Dr. Bobby Ruelas M.D   on Workstation: [email or web address]          US Thoracentesis   Final Result   Very minimal pleural fluid, insufficient for thoracentesis               This report was finalized on 11/16/2023 10:18 AM by Dr. Payam Phillips M.D on Workstation: [id number]          XR Chest 1 View   Final Result   Worsening vascular congestion.       This report was finalized on 11/16/2023 5:16 AM by Dr. Amparo Hodges M.D on Workstation: [id number]          XR Chest PA & Lateral   Final Result   FINDINGS AND IMPRESSION:   Presumed feeding tube courses below the diaphragm and the tip is comment   of field-of-view. Suggestion of epicardial pacing wires. Median   sternotomy wires are present. Suggestion of a small to moderate size   pleural effusion  posteriorly, favored to be left-sided; however, is not   definitively seen on the frontal radiograph. Findings can be better   characterized with chest CT if clinically indicated.       Pulmonary vascular congestion with superimposed pulmonary opacification   is present within the bilateral mid to lower lungs, left greater than   right, as before. There is improved expansion within the bilateral lung   bases. No pneumothorax is seen. However, please note evaluation of the   left lung apex is suboptimal due to partial obscuration by the patient's   face and a small left apical pneumothorax cannot be excluded.. Cardiac   silhouette is accentuated by low lung volumes but appears to be at least   mildly enlarged.               This report was finalized on 11/15/2023 6:06 PM by Dr. Darren Bender M.D on Workstation: [id number]          XR Chest 1 View   Final Result   Central vascular engorgement with bilateral pleural   effusions.       This report was finalized on 11/15/2023 7:28 AM by Dr. Jose Juan Aviles M.D on Workstation: [id number]          XR Chest 1 View   Final Result   No significant interval change.       This report was finalized on 11/14/2023 5:11 AM by Dr. Amparo Hodges M.D on Workstation: [id number]          XR Chest 1 View   Final Result   Persistent vascular congestion and bilateral effusions. Left pleural   effusion may have increased when compared to prior study.       This report was finalized on 11/13/2023 4:43 AM by Dr. Amparo Hodges M.D on Workstation: [id number]          XR Chest 1 View   Final Result   Persistent vascular congestion and bibasilar atelectasis.       This report was finalized on 11/11/2023 5:38 AM by Dr. Amparo Hodges M.D on Workstation: [id number]          XR Chest 1 View   Final Result   1. No evidence of pneumothorax.   2. Mild cardiomegaly.   3. Increased density in the left base as described.           This report was finalized on 11/10/2023 11:07 AM  by Dr. Clyde Rayo M.D on Workstation: [id number]          XR Chest 1 View   Final Result      XR Chest 1 View   Final Result   No significant change.       This report was finalized on 11/9/2023 11:06 AM by Dr. Norm Arshad M.D on Workstation: [id number]          XR Chest 1 View   Final Result   The chest appears largely unchanged from yesterday's study.       This report was finalized on 11/8/2023 8:12 AM by Dr. Clyde Rayo M.D on Workstation: [id number]          XR Abdomen KUB   Final Result   1. No evidence of bowel obstruction.   2. NG tube courses into the stomach.           This report was finalized on 11/8/2023 7:30 AM by Dr. Clyde Rayo M.D on Workstation: [id number]          XR Chest 1 View   Final Result   Endotracheal tube adjusted. Otherwise, no significant   change.       This report was finalized on 11/7/2023 8:52 AM by Dr. Norm Arshad M.D on Workstation: [id number]          XR Abdomen KUB   Final Result      XR Chest 1 View   Final Result   1. Satisfactory position of life support devices.   2. No significant pneumothorax is seen.   3. Increased density in the left lung base as described. Follow-up films   recommended.       This report was finalized on 11/6/2023 6:58 PM by Dr. Clyde Rayo M.D on Workstation: [id number]          XR Chest 1 View   Final Result   As described.               This report was finalized on 11/6/2023 4:22 PM by Dr. Norm Arshad M.D on Workstation: [id number]          XR Chest 1 View   Final Result       1. Since chest x-ray earlier this morning 11/05/2023 at 3:30 a.m. there   has been removal of 2 left-sided chest tubes or mediastinal drains and   there has been exchange of the right internal jugular pulmonary artery   catheter for right internal jugular central line with its tip now in the   superior vena cava in good position. Otherwise, there has been no   significant change. The remainder of the life-support lines are in    stable and good position. There is mild enlargement of the   cardiomediastinal silhouette. There is some atelectasis at the left lung   base and a small left effusion. No additional active disease is seen in   the chest with no pneumothorax seen.       This report was finalized on 11/6/2023 6:54 AM by Dr. Bobby Ruelas M.D   on Workstation: [id number]          XR Chest 1 View   Final Result   Persistent left basilar opacity.       This report was finalized on 11/5/2023 6:18 AM by Dr. Norm Arshad M.D on Workstation: [email or web address]          CT Head Without Contrast   Final Result   1. Since yesterday's head CT on 11/03/2023 at 9:45 a.m., there is a   stable thin 2 mm thick acute subdural hematoma along the right side of   the far posterior inferior falx cerebri extending along the superior   aspect of the medial right tentorium cerebelli as well as stable thin   2-3 mm thick acute subdural hematoma tracking over the posterior   inferior right parietal lobe and posterior right occipital lobe. It   exerts no mass effect. Furthermore, since yesterday's head CT there is a   decrease with near resolution of the CSF density subdural hygroma   overlying the anterolateral right frontal lobe and extending over the   superior right frontal lobe. Continued followup to resolution of the   subdural is suggested. The remainder of the head CT is unremarkable. If   the patient truly had a new onset seizure an MRI of the brain with and   without contrast is suggested to further evaluate. Results were   discussed with Dr. Guillen by telephone 11/04/2023 at 1.       Radiation dose reduction techniques were utilized, including automated   exposure control and exposure modulation based on body size.           This report was finalized on 11/5/2023 11:10 AM by Dr. Bobby Ruelas M.D   on Workstation: [id number]          XR Chest 1 View   Final Result   Partial improvement.               This report was finalized on 11/4/2023 6:21 AM by  Dr. Norm Arshad M.D on Workstation: [email or web address]          CT Head Without Contrast   Final Result   1. There is a thin acute subdural hematoma related to the posterior   aspect of the falx and extending over the medial aspect of the tentorium   cerebelli on the right measuring approximately 2 mm in maximum   thickness. There is a low-attenuation hygroma appreciated overlying the   right frontal lobe laterally and superolaterally measuring approximately   3 mm in thickness, which is new versus 02/13/2023.    2. There is no evidence of acute infarction or of intra-axial   hemorrhage.       The above information was called to the patient's nurse at the time of   the dictation. The patient's nurse is to immediately relay the   information to the clinical service.                           Radiation dose reduction techniques were utilized, including automated   exposure control and exposure modulation based on body size.           This report was finalized on 11/3/2023 4:50 PM by Dr. Mike Ernst M.D   on Workstation: [id number]          XR Chest 1 View   Final Result   Possible tiny medial left apical pneumothorax.       This report was finalized on 11/3/2023 4:44 AM by Dr. Amparo Hodges M.D on Workstation: [id number]          XR Abdomen KUB   Final Result      XR Chest 1 View   Final Result   FINDINGS AND IMPRESSION:   Right internal jugular pulmonary artery catheter tip overlies the   mediastinum. Endotracheal tube terminates between the clavicles.   Orogastric tube courses below the diaphragm the tip is collimated out of   field-of-view. 3 thoracostomy tubes overlie the mediastinum. Mild   pulmonary opacification is present within the right lung base and left   mid and lower lung. While findings may represent atelectasis and/or   postoperative changes, multifocal pneumonia and/or pulmonary edema could   have a similar appearance in the appropriate clinical context and   correlation with patient history  as well as continued attention on   follow-up is recommended. Presumed epicardial pacing wires. There are   median sternotomy wires.       There is a questionable tiny left apical pneumothorax resulting in   approximately 3 mm of pleural-parenchymal separation. This finding was   discussed with Kizzy, the patient's nurse, by telephone by Darren Bender   at   3:05 p.m. on  11/2/2023  .       Cardiac silhouette is mildly enlarged, as before.       This report was finalized on 11/2/2023 3:07 PM by Dr. Darren Bender M.D   on Workstation: [id number]          XR Chest PA & Lateral   Final Result      CT Angiogram Chest   Final Result   1. Ascending aortic aneurysm/dissection with annuloaortic ectasia,   suggesting connective tissue disease       2. Moderate cardiomegaly       3. Small right pleural effusion       4. Intraperitoneal free gas and incompletely evaluated ascites, likely   secondary to peritoneal dialysis       5. Incidental findings as above.       These findings were discussed with Dr Bliss by Dr Acosta at the time   of dictation.       This report was finalized on 10/30/2023 9:47 AM by Dr. Nilay Acosta M.D   on Workstation: [id number]          XR Abdomen KUB   Final Result       As described.           This report was finalized on 10/26/2023 8:27 AM by Dr. Norm Arshad M.D on Workstation: [id number]          XR Chest 1 View   Final Result         Electronically signed by Amari Romero MD on 10-26-23 at 0209          Assessment & Plan     Active Hospital Problems    Diagnosis     **Dissecting ascending aortic aneurysm     Recent cerebrovascular accident (CVA)     Aortic valve lesion     Severe aortic valve regurgitation     Hyponatremia     Poor appetite     Moderate malnutrition     Chronic diastolic CHF (congestive heart failure)     Anemia due to chronic kidney disease, on chronic dialysis     Peritoneal dialysis catheter in place     Essential hypertension     End stage renal disease on  dialysis     Seizure disorder     Elevated liver function tests     Pericardial effusion     Systemic lupus erythematosus     Thrombocytopenia     Hypertension secondary to other renal disorders     Pancytopenia     Vitamin D deficiency        Assessment:  Rumination without nausea  Dissecting ascending aortic aneurysm  Moderate malnutrition  ESRD on dialysis  Seizure disorder  Cardiogenic cirrhosis  Chronic diastolic heart failure  Gastritis      Plan:  Discussed with nursing staff that due to resolution of prior nausea without subsequent regurgitation of swallowed oral contents.  We will cancel ordered upper GI with small bowel follow-through however could consider completing in the future if patient experience a recurrence and nausea or vomiting.    GI will sign off for now.  As always, thank you for allowing us to participate in the care of your patient.  If we can be of further assistance please not hesitate to reach out to us.      I discussed the patients findings and my recommendations with patient and nursing staff.          CAESAR Huff  [facility]  [address] [place] [address]

## 2023-12-01 NOTE — NURSING NOTE
hd without incident. c/o pain, norco administered. removed 1 L. drsg current, cdi. stable post completion of hd. stable post completion of hd.

## 2023-12-01 NOTE — SIGNIFICANT NOTE
12/01/23 0923   OTHER   Discipline physical therapist   Rehab Time/Intention   Session Not Performed other (see comments)  (Patient in OR this date. PT will f/u.)   Recommendation   PT - Next Appointment 12/02/23

## 2023-12-01 NOTE — OP NOTE
Operative Note :  Maryellen Ashton MD      Dee Herrera  1990    Procedure Date: 12/01/23    Pre-op Diagnosis:  Peritoneal dialysis catheter in place [Z99.2]  End stage renal disease on dialysis [N18.6, Z99.2]    Post-Operative Diagnosis:  Peritoneal dialysis catheter in place [Z99.2]  End stage renal disease on dialysis [N18.6, Z99.2]    Procedure:   Removal peritoneal dialysis catheter    Surgeon: Maryellen Ashton MD    Assistant: Ian Ross CSA (Ian was responsible for suctioning, retracting, suturing of all surgical incisions, and application of sterile dressings at the completion of the case)    Anesthesia:  Began with MAC converted to General (monitored anesthestic care converted to general endotracheal tube)    Estimated Blood Loss: minimal    Specimens: None    Complications: None    Indications:  The patient is a 33-year-old lady who has been admitted to the hospital for over a month with a multitude of medical issues.  She has end-stage renal disease and has been receiving peritoneal dialysis since earlier this year but is elected to switch to hemodialysis.  I been asked to remove her peritoneal dialysis catheter.  She was counseled on the risks, benefits, and alternatives and has consented to proceed.    Description of procedure:  The patient was brought to the operating room and placed on the OR table in supine position.  Continuous propofol anesthesia was administered and a surgical timeout completed after prepping and draping the abdominal wall in a sterile fashion.  The exit site along the left upper quadrant was anesthetized using 0.5% Marcaine with epinephrine.  She continuously was moaning during injection of anesthetic and was not likely to tolerate any deeper invasive manipulation of her peritoneal dialysis catheter, so the anesthesia staff converted to a laryngeal mask airway and induced general anesthesia.  I tried to spread the subcutaneous tract through the skin down to  the superficial cuff, but it was too deep to reach and dissect circumferentially so I anesthetized and incised the skin directly over the superficial cuff and divided around the superficial cuff through the subcutaneous fat with electrocautery until it was liberated.  I then traced the catheter down to the left periumbilical region where an additional incision was made after anesthetizing the skin.  The deeper muscular cuff was dissected circumferentially using electrocautery until it was also liberated and the entire peritoneal dialysis catheter removed and discarded.  There was clear serous fluid that welled up from the abdomen but no evidence for purulence.  The fascia of the periumbilical abdominal wall was closed using a 0 Vicryl figure-of-eight suture and all skin incisions closed using 3-0 Vicryl deep dermal sutures followed by running 4-0 Vicryl subcuticular suture and topical Exofin glue.  The skin exit site was left open to drain passively.  She then transferred to the recovery room in stable condition with all counts correct per nursing after extubation.    Recommendations:  She can resume regular diet as tolerated.  I will sign off, she does not need to follow-up with me.    Maryellen Ashton MD  General, Robotic, and Endoscopic Surgery  [facility]    [address], [address]  [place] [address]  P: [phone]  F: [phone]

## 2023-12-01 NOTE — PLAN OF CARE
Goal Outcome Evaluation:  Plan of Care Reviewed With: patient        Progress: no change  Outcome Evaluation: VSS, no complaints of pain this shift.  Pt refusing most medications.  Pt is NPO for SBFT and surgery today to remove PD catheter.  Pt to have HD later today.  Pt is up with assist x1.

## 2023-12-01 NOTE — PROGRESS NOTES
"[facility] Follow Up    Chief Complaint: follow up aortic aneurysm, aortic regurgitation, status post repair    Interval History: She complains of feeling hungry this morning.  Otherwise no complaints of shortness of breath or pain at this time.    Objective:     Objective:  Temp:  [97.9 °F (36.6 °C)-98.6 °F (37 °C)] 98.3 °F (36.8 °C)  Heart Rate:  [71-77] 77  Resp:  [15-18] 18  BP: (121-133)/(73-98) 126/73     Intake/Output Summary (Last 24 hours) at 12/1/2023 0745  Last data filed at 11/30/2023 1700  Gross per 24 hour   Intake 480 ml   Output --   Net 480 ml     Body mass index is 16.27 kg/m².      11/29/23  1730 11/30/23  0300 12/01/23  0600   Weight: 44 kg (97 lb) 44 kg (97 lb) 44.3 kg (97 lb 10.6 oz)     Weight change: 0.3 kg (10.6 oz)      Physical Exam:   General : Alert, cooperative, in no acute distress.  Neuro: Alert,cooperative and oriented.  Lungs: CTAB. Normal respiratory effort and rate.  CV: Regular rate and rhythm, normal S1 and S2, no murmurs, gallops or rubs.  ABD: Soft, nontender, nondistended. Positive bowel sounds.  Extr: No edema or cyanosis, moves all extremities.    Lab Review:   Results from last 7 days   Lab Units 11/30/23  0716 11/29/23  0546   SODIUM mmol/L 127* 124*   POTASSIUM mmol/L 4.5 4.1   CHLORIDE mmol/L 94* 86*   CO2 mmol/L 25.3 24.3   BUN mg/dL 26* 50*   CREATININE mg/dL 3.51* 5.49*   GLUCOSE mg/dL 80 92   CALCIUM mg/dL 8.7 8.9         Results from last 7 days   Lab Units 12/01/23  0643 11/30/23  0716   WBC 10*3/mm3 7.40 6.10   HEMOGLOBIN g/dL 8.5* 8.3*   HEMATOCRIT % 26.1* 26.7*   PLATELETS 10*3/mm3 238 240         Results from last 7 days   Lab Units 11/30/23  0716 11/29/23  0546   MAGNESIUM mg/dL 1.9 2.1           Invalid input(s): \"LDLCALC\"          I reviewed the patient's new clinical results.  I personally viewed and interpreted the patient's EKG  Current Medications:   Scheduled Meds:apixaban, 5 mg, Oral, Q12H  aspirin, 81 mg, Nasogastric, " Daily  carvedilol, 25 mg, Nasogastric, Q12H  chlorhexidine, 15 mL, Mouth/Throat, Q12H  escitalopram, 10 mg, Nasogastric, Daily  First Mouthwash (Magic Mouthwash), 10 mL, Swish & Spit, Q6H  hydroxychloroquine, 200 mg, Oral, Daily  insulin regular, 2-7 Units, Subcutaneous, Q6H  Lacosamide, 100 mg, Intravenous, Q12H  lactulose, 10 g, Nasogastric, TID  lidocaine, 10 mL, Subcutaneous, Once  metoclopramide, 5 mg, Intravenous, Q6H  mupirocin, , Each Nare, BID  mycophenolate, 250 mg, Oral, Q12H  ondansetron, 4 mg, Oral, TID AC  pantoprazole, 40 mg, Intravenous, Q AM      Continuous Infusions:hold, 1 each  lactated ringers, 9 mL/hr, Last Rate: Stopped (11/29/23 3014)  sodium chloride, 75 mL/hr, Last Rate: 75 mL/hr (11/29/23 1042)        Allergies:  Allergies   Allergen Reactions    Minoxidil Hives and Other (See Comments)     Pericardial effusion .       Assessment/Plan:   Ascending aortic aneurysm with subacute/chronic aortic root dissection.  Status post aneurysm repair and replacement on 11/2.   Severe aortic regurgitation.  Due to #1.  Status post repair on 11/2.   Cardiac tamponade: secondary to pericardial thrombus anteriorly and compressing right ventricle.  Requiring reexploration with clot removal and treatment of a small amount of bleeding at the suture line on 11/6.  Seizures.  Started postoperatively.  Svetlana on antiepileptics.  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  On peritoneal dialysis at home.  On hemodialysis postoperatively and then switch back to peritoneal dialysis after 11/13.  She has not been doing as well on peritoneal dialysis and is now back on hemodialysis.  Peritoneal dialysis catheter to be removed today.     Hyponatremia.  Persistent but slowly improved this morning.  Hypertension.  Controlled on carvedilol.  Tolerating discontinuation of midodrine.      Chronic anemia. stable   Systemic lupus erythematosus.  Hydroxychloroquine and mycophenolate held perioperatively but now resumed.     Depression.she continues to deny depression and.  However she appears to be improving following the initiation of escitalopram.    Right MCA CVA. confirmed by MRI. Neurology expects full recovery.  12. Right IJ DVT.  Subacute.  Noted incidentally at the time of tunneled dialysis catheter placement and confirmed with ultrasound.  Felt to be provoked.  Vascular surgery recommends 6 months of anticoagulation.  Apixaban ordered but yet to start in light of procedure today.    -Plans for removal of peritoneal dialysis catheter this morning noted.  -Plans for further workup of postprandial vomiting per GI today noted.  -Continue current dose of carvedilol.    Juana Taylor MD  12/01/23  07:45 EST

## 2023-12-01 NOTE — ANESTHESIA POSTPROCEDURE EVALUATION
"Patient: Dee Herrera    Procedure Summary       Date: 12/01/23 Room / Location: [facility] OR  / [facility] MAIN OR    Anesthesia Start: 1035 Anesthesia Stop: 1126    Procedure: REMOVAL PERITONEAL DIALYSIS CATHETER (Abdomen) Diagnosis:       Peritoneal dialysis catheter in place      End stage renal disease on dialysis      (Peritoneal dialysis catheter in place [Z99.2])      (End stage renal disease on dialysis [N18.6, Z99.2])    Surgeons: Maryellen Ashton MD Provider: Jaylyn Phillips MD    Anesthesia Type: MAC ASA Status: 4            Anesthesia Type: MAC    Vitals  Vitals Value Taken Time   /99 12/01/23 1230   Temp 36.7 °C (98 °F) 12/01/23 1225   Pulse 68 12/01/23 1233   Resp 20 12/01/23 1230   SpO2 99 % 12/01/23 1233   Vitals shown include unfiled device data.        Post Anesthesia Care and Evaluation    Patient location during evaluation: PACU  Patient participation: complete - patient participated  Level of consciousness: awake and alert  Pain management: adequate    Airway patency: patent  Anesthetic complications: No anesthetic complications    Cardiovascular status: acceptable  Respiratory status: acceptable  Hydration status: acceptable    Comments: /99   Pulse 67   Temp 36.7 °C (98 °F) (Oral)   Resp 20   Ht 165 cm (64.96\")   Wt 44.3 kg (97 lb 10.6 oz)   LMP 08/10/2022 (Approximate) Comment: pt unsure of last period, hcg serum sent to lab. pending results.  SpO2 98%   BMI 16.27 kg/m²       "

## 2023-12-01 NOTE — PROGRESS NOTES
[facility] Progress Note      Patient Name: Dee Herrera  : 1990  MRN: [id number]  Primary Care Physician:  Margarita Woods APRN  Date of admission: 10/26/2023    Subjective     Interval History:   Follow-up end-stage renal disease currently on peritoneal dialysis    Her PD catheter was removed earlier today patient admits lethargic postoperatively but she is responding to questions answering properly, denies any chest pain or shortness of air, no orthopnea or PND    Review of Systems:   As noted above    Objective     Vitals:   Temp:  [98 °F (36.7 °C)-98.8 °F (37.1 °C)] 98.2 °F (36.8 °C)  Heart Rate:  [64-92] 64  Resp:  [16-20] 20  BP: (121-172)/() 131/97  Flow (L/min):  [1.5-3] 3    Intake/Output Summary (Last 24 hours) at 2023 1350  Last data filed at 2023 1121  Gross per 24 hour   Intake 640 ml   Output --   Net 640 ml       Physical Exam:    General Appearance: alert, awake, chronically ill, no acute distress   Skin: warm and dry  HEENT: Core track is in place  Neck: supple, no JVD, tunneled dialysis cath in the left IJ with exit site below the right clavicle  Lungs: Bilateral rhonchi, breathing effort not labored  Heart: RRR, normal S1 and S2, no rub  Abdomen: soft, nontender, nondistended, normoactive bowels,  Extremities: no edema, cyanosis or clubbing  Neuro: Moving all extremities    Scheduled Meds:     [START ON 2023] apixaban, 5 mg, Oral, Q12H  aspirin, 81 mg, Nasogastric, Daily  carvedilol, 25 mg, Nasogastric, Q12H  chlorhexidine, 15 mL, Mouth/Throat, Q12H  escitalopram, 10 mg, Nasogastric, Daily  First Mouthwash (Magic Mouthwash), 10 mL, Swish & Spit, Q6H  hydroxychloroquine, 200 mg, Oral, Daily  insulin regular, 2-7 Units, Subcutaneous, Q6H  Lacosamide, 100 mg, Intravenous, Q12H  lactulose, 10 g, Nasogastric, TID  lidocaine, 10 mL, Subcutaneous, Once  metoclopramide, 5 mg, Intravenous, Q6H  mupirocin, , Each Nare, BID  mycophenolate, 250  mg, Oral, Q12H  ondansetron, 4 mg, Oral, TID AC  pantoprazole, 40 mg, Intravenous, Q AM      IV Meds:   sodium chloride, 75 mL/hr, Last Rate: 75 mL/hr (12/01/23 1035)        Results Reviewed:   I have personally reviewed the results from the time of this admission to 12/1/2023 13:50 EST     Results from last 7 days   Lab Units 12/01/23  0643 11/30/23  0716 11/29/23  0546   SODIUM mmol/L 128* 127* 124*   POTASSIUM mmol/L 5.5* 4.5 4.1   CHLORIDE mmol/L 91* 94* 86*   CO2 mmol/L 24.4 25.3 24.3   BUN mg/dL 37* 26* 50*   CREATININE mg/dL 5.40* 3.51* 5.49*   CALCIUM mg/dL 8.9 8.7 8.9   GLUCOSE mg/dL 73 80 92       Estimated Creatinine Clearance: 10.6 mL/min (A) (by C-G formula based on SCr of 5.4 mg/dL (H)).    Results from last 7 days   Lab Units 12/01/23  0643 11/30/23  0716 11/29/23  0546 11/28/23  1830   MAGNESIUM mg/dL  --  1.9 2.1 2.4   PHOSPHORUS mg/dL 4.9* 4.4 3.9  --              Results from last 7 days   Lab Units 12/01/23  0643 11/30/23  0716 11/29/23  0546 11/28/23  0526 11/27/23  0704   WBC 10*3/mm3 7.40 6.10 7.70 7.53 7.31   HEMOGLOBIN g/dL 8.5* 8.3* 9.5* 9.4* 9.6*   PLATELETS 10*3/mm3 238 240 225 201 203             Assessment / Plan     ASSESSMENT:  End-stage renal disease on peritoneal dialysis etiology of her ESRD is related to lupus nephritis.  PD catheter was removed today, plan for hemodialysis today   hyponatremia, sodium today up to 128,  Acute CVA, cortical infarct in the anterior inferior medial right frontal lobe and subacute subdural hematoma right occipital.  Ascending aortic aneurysm with subacute/chronic aortic root dissection, status postrepair of proximal aortic aneurysm 11/2/2023.  Reexploration for cardiac tamponade on 11/6/2023.  Seizure disorder appears to be stable currently on lacosamide  Anemia of chronic kidney disease hemoglobin today is 8.5  Hypomagnesemia, resolved    PLAN:  Hemodialysis today, and the dialysate will be 135 mEq/L of sodium in the dialysate  Continue fluid  restriction 1000 cc per 24 hours  Surveillance labs    I reviewed the chart and other providers notes, I reviewed labs.  I discussed the case with the patient .  Copied text in this note has been reviewed and is accurate as of 12/01/23.       Thank you for involving us in the care of Dee Esparzaes.  Please feel free to call with any questions.    Isaac Diaz MD  12/01/23  13:50 [facility]    [facility]  [phone]    Please note that portions of this note were completed with a voice recognition program.

## 2023-12-01 NOTE — NURSING NOTE
Access Center follow-up d/t depression.    Patient in surgery today to remove PD catheter as patient is transitioning to HD. Patient scheduled for dialysis later today. Patient has been able to participate with OT and PT. Patient continuously denies depression during Access Center visits. Access following.

## 2023-12-02 LAB
ALBUMIN SERPL-MCNC: 2.3 G/DL (ref 3.5–5.2)
ANION GAP SERPL CALCULATED.3IONS-SCNC: 9.8 MMOL/L (ref 5–15)
BASOPHILS # BLD AUTO: 0.01 10*3/MM3 (ref 0–0.2)
BASOPHILS NFR BLD AUTO: 0.1 % (ref 0–1.5)
BUN SERPL-MCNC: 27 MG/DL (ref 6–20)
BUN/CREAT SERPL: 8 (ref 7–25)
CALCIUM SPEC-SCNC: 8.8 MG/DL (ref 8.6–10.5)
CHLORIDE SERPL-SCNC: 96 MMOL/L (ref 98–107)
CO2 SERPL-SCNC: 23.2 MMOL/L (ref 22–29)
CREAT SERPL-MCNC: 3.38 MG/DL (ref 0.57–1)
DEPRECATED RDW RBC AUTO: 44 FL (ref 37–54)
EGFRCR SERPLBLD CKD-EPI 2021: 17.9 ML/MIN/1.73
EOSINOPHIL # BLD AUTO: 0.01 10*3/MM3 (ref 0–0.4)
EOSINOPHIL NFR BLD AUTO: 0.1 % (ref 0.3–6.2)
ERYTHROCYTE [DISTWIDTH] IN BLOOD BY AUTOMATED COUNT: 14.1 % (ref 12.3–15.4)
GLUCOSE BLDC GLUCOMTR-MCNC: 103 MG/DL (ref 70–130)
GLUCOSE BLDC GLUCOMTR-MCNC: 117 MG/DL (ref 70–130)
GLUCOSE BLDC GLUCOMTR-MCNC: 122 MG/DL (ref 70–130)
GLUCOSE BLDC GLUCOMTR-MCNC: 91 MG/DL (ref 70–130)
GLUCOSE BLDC GLUCOMTR-MCNC: 93 MG/DL (ref 70–130)
GLUCOSE SERPL-MCNC: 89 MG/DL (ref 65–99)
HCT VFR BLD AUTO: 26.4 % (ref 34–46.6)
HGB BLD-MCNC: 8.3 G/DL (ref 12–15.9)
IMM GRANULOCYTES # BLD AUTO: 0.08 10*3/MM3 (ref 0–0.05)
IMM GRANULOCYTES NFR BLD AUTO: 0.9 % (ref 0–0.5)
LYMPHOCYTES # BLD AUTO: 0.61 10*3/MM3 (ref 0.7–3.1)
LYMPHOCYTES NFR BLD AUTO: 6.8 % (ref 19.6–45.3)
MCH RBC QN AUTO: 27 PG (ref 26.6–33)
MCHC RBC AUTO-ENTMCNC: 31.4 G/DL (ref 31.5–35.7)
MCV RBC AUTO: 86 FL (ref 79–97)
MONOCYTES # BLD AUTO: 0.68 10*3/MM3 (ref 0.1–0.9)
MONOCYTES NFR BLD AUTO: 7.6 % (ref 5–12)
NEUTROPHILS NFR BLD AUTO: 7.58 10*3/MM3 (ref 1.7–7)
NEUTROPHILS NFR BLD AUTO: 84.5 % (ref 42.7–76)
NRBC BLD AUTO-RTO: 0 /100 WBC (ref 0–0.2)
PHOSPHATE SERPL-MCNC: 4.6 MG/DL (ref 2.5–4.5)
PLATELET # BLD AUTO: 238 10*3/MM3 (ref 140–450)
PMV BLD AUTO: 10.9 FL (ref 6–12)
POTASSIUM SERPL-SCNC: 5.1 MMOL/L (ref 3.5–5.2)
POTASSIUM SERPL-SCNC: 5.3 MMOL/L (ref 3.5–5.2)
RBC # BLD AUTO: 3.07 10*6/MM3 (ref 3.77–5.28)
SODIUM SERPL-SCNC: 129 MMOL/L (ref 136–145)
WBC NRBC COR # BLD AUTO: 8.97 10*3/MM3 (ref 3.4–10.8)

## 2023-12-02 PROCEDURE — 63710000001 ONDANSETRON PER 8 MG: Performed by: SURGERY

## 2023-12-02 PROCEDURE — 97530 THERAPEUTIC ACTIVITIES: CPT

## 2023-12-02 PROCEDURE — 99232 SBSQ HOSP IP/OBS MODERATE 35: CPT | Performed by: NURSE PRACTITIONER

## 2023-12-02 PROCEDURE — 25010000002 LACOSAMIDE 200 MG/20ML SOLUTION: Performed by: SURGERY

## 2023-12-02 PROCEDURE — 80069 RENAL FUNCTION PANEL: CPT | Performed by: INTERNAL MEDICINE

## 2023-12-02 PROCEDURE — 85025 COMPLETE CBC W/AUTO DIFF WBC: CPT | Performed by: INTERNAL MEDICINE

## 2023-12-02 PROCEDURE — 84132 ASSAY OF SERUM POTASSIUM: CPT | Performed by: INTERNAL MEDICINE

## 2023-12-02 PROCEDURE — C9254 INJECTION, LACOSAMIDE: HCPCS | Performed by: SURGERY

## 2023-12-02 PROCEDURE — 97164 PT RE-EVAL EST PLAN CARE: CPT

## 2023-12-02 PROCEDURE — 82948 REAGENT STRIP/BLOOD GLUCOSE: CPT

## 2023-12-02 PROCEDURE — 25010000002 METOCLOPRAMIDE PER 10 MG: Performed by: SURGERY

## 2023-12-02 RX ADMIN — ESCITALOPRAM OXALATE 10 MG: 10 TABLET, FILM COATED ORAL at 09:54

## 2023-12-02 RX ADMIN — METOCLOPRAMIDE 5 MG: 5 INJECTION, SOLUTION INTRAMUSCULAR; INTRAVENOUS at 09:55

## 2023-12-02 RX ADMIN — CARVEDILOL 25 MG: 25 TABLET, FILM COATED ORAL at 09:54

## 2023-12-02 RX ADMIN — PANTOPRAZOLE SODIUM 40 MG: 40 INJECTION, POWDER, FOR SOLUTION INTRAVENOUS at 09:54

## 2023-12-02 RX ADMIN — ONDANSETRON HYDROCHLORIDE 4 MG: 4 TABLET, FILM COATED ORAL at 18:35

## 2023-12-02 RX ADMIN — ASPIRIN 81 MG: 81 TABLET, CHEWABLE ORAL at 09:54

## 2023-12-02 RX ADMIN — HYDROCODONE BITARTRATE AND ACETAMINOPHEN 1 TABLET: 7.5; 325 TABLET ORAL at 12:56

## 2023-12-02 RX ADMIN — ONDANSETRON HYDROCHLORIDE 4 MG: 4 TABLET, FILM COATED ORAL at 09:54

## 2023-12-02 RX ADMIN — CARVEDILOL 25 MG: 25 TABLET, FILM COATED ORAL at 21:53

## 2023-12-02 RX ADMIN — HYDROCODONE BITARTRATE AND ACETAMINOPHEN 1 TABLET: 7.5; 325 TABLET ORAL at 02:34

## 2023-12-02 RX ADMIN — APIXABAN 5 MG: 5 TABLET, FILM COATED ORAL at 09:54

## 2023-12-02 RX ADMIN — APIXABAN 5 MG: 5 TABLET, FILM COATED ORAL at 21:53

## 2023-12-02 RX ADMIN — METOCLOPRAMIDE 5 MG: 5 INJECTION, SOLUTION INTRAMUSCULAR; INTRAVENOUS at 12:29

## 2023-12-02 RX ADMIN — LACOSAMIDE 100 MG: 10 INJECTION INTRAVENOUS at 09:56

## 2023-12-02 RX ADMIN — METOCLOPRAMIDE 5 MG: 5 INJECTION, SOLUTION INTRAMUSCULAR; INTRAVENOUS at 18:35

## 2023-12-02 RX ADMIN — LACOSAMIDE 100 MG: 10 INJECTION INTRAVENOUS at 21:53

## 2023-12-02 RX ADMIN — SODIUM ZIRCONIUM CYCLOSILICATE 10 G: 10 POWDER, FOR SUSPENSION ORAL at 14:42

## 2023-12-02 RX ADMIN — HYDROXYCHLOROQUINE SULFATE 200 MG: 200 TABLET ORAL at 09:54

## 2023-12-02 RX ADMIN — METOCLOPRAMIDE 5 MG: 5 INJECTION, SOLUTION INTRAMUSCULAR; INTRAVENOUS at 02:34

## 2023-12-02 NOTE — PLAN OF CARE
Goal Outcome Evaluation:  Plan of Care Reviewed With: patient        Progress: improving  Outcome Evaluation: Pt resting in bed in NAD, unaware of what day/date it is or if she had a procedure yesterday. Pt req encouragement to participate with therapy adn c/o abd pain = 4/10 that increases with movement. Pt req CGA to sit EOB. She performed seated LE ther ex with vc for form. She stood from EOB with CGA-min A using r wx. Pt amb 80' with r wx and CGA - very slow pace, guarded posture, decreased step length and narrow JOSLYN, endurance improving but still limits further amb distance, no LOB. Pt returned supine in bed with CGA - min A to raise legs back into bed. Pt progressing slowly toward goals - still limited by generalized weakness and decreased activity tolerance from prolonged immobility. Cont PT to address functional deficits and prepare for d/c as appropriate

## 2023-12-02 NOTE — NURSING NOTE
Access center follow up.    Patient resting soundly with eyes closed. Chart reviewed. Patient participated in PT today. No behavioral issues noted. Current medication lexapro. Discharge plan pending SNF vs [facility] rehab. Access following.

## 2023-12-02 NOTE — PROGRESS NOTES
[facility] Progress Note      Patient Name: Dee Herrera  : 1990  MRN: [id number]  Primary Care Physician:  Margarita Woods APRN  Date of admission: 10/26/2023    Subjective     Interval History:   Follow-up end-stage renal disease currently on peritoneal dialysis    Patient is feeling better today her abdominal discomfort improved since the removal of the PD catheter yesterday, denies any chest pain or shortness of air, no orthopnea or PND    Review of Systems:   As noted above    Objective     Vitals:   Temp:  [97.3 °F (36.3 °C)-98.2 °F (36.8 °C)] 97.6 °F (36.4 °C)  Heart Rate:  [64-85] 64  Resp:  [16-20] 16  BP: (112-141)/() 132/94  Flow (L/min):  [2-3] 3  No intake or output data in the 24 hours ending 23 1221      Physical Exam:    General Appearance: alert, awake, chronically ill, no acute distress   Skin: warm and dry  HEENT: Core track is in place  Neck: supple, no JVD, tunneled dialysis cath in the left IJ with exit site below the right clavicle  Lungs: Bilateral rhonchi, breathing effort not labored  Heart: RRR, normal S1 and S2, no rub  Abdomen: soft, nontender, nondistended, normoactive bowels,  Extremities: no edema, cyanosis or clubbing  Neuro: Moving all extremities    Scheduled Meds:     apixaban, 5 mg, Oral, Q12H  aspirin, 81 mg, Nasogastric, Daily  carvedilol, 25 mg, Nasogastric, Q12H  chlorhexidine, 15 mL, Mouth/Throat, Q12H  escitalopram, 10 mg, Nasogastric, Daily  First Mouthwash (Magic Mouthwash), 10 mL, Swish & Spit, Q6H  hydroxychloroquine, 200 mg, Oral, Daily  insulin regular, 2-7 Units, Subcutaneous, Q6H  Lacosamide, 100 mg, Intravenous, Q12H  lactulose, 10 g, Nasogastric, TID  lidocaine, 10 mL, Subcutaneous, Once  metoclopramide, 5 mg, Intravenous, Q6H  mupirocin, , Each Nare, BID  mycophenolate, 250 mg, Oral, Q12H  ondansetron, 4 mg, Oral, TID AC  pantoprazole, 40 mg, Intravenous, Q AM      IV Meds:   sodium chloride, 75 mL/hr, Last  Rate: 75 mL/hr (12/01/23 1035)        Results Reviewed:   I have personally reviewed the results from the time of this admission to 12/2/2023 12:21 EST     Results from last 7 days   Lab Units 12/02/23  0724 12/01/23  0643 11/30/23  0716   SODIUM mmol/L 129* 128* 127*   POTASSIUM mmol/L 5.3* 5.5* 4.5   CHLORIDE mmol/L 96* 91* 94*   CO2 mmol/L 23.2 24.4 25.3   BUN mg/dL 27* 37* 26*   CREATININE mg/dL 3.38* 5.40* 3.51*   CALCIUM mg/dL 8.8 8.9 8.7   GLUCOSE mg/dL 89 73 80       Estimated Creatinine Clearance: 16.8 mL/min (A) (by C-G formula based on SCr of 3.38 mg/dL (H)).    Results from last 7 days   Lab Units 12/02/23  0724 12/01/23  0643 11/30/23  0716 11/29/23  0546 11/28/23  1830   MAGNESIUM mg/dL  --   --  1.9 2.1 2.4   PHOSPHORUS mg/dL 4.6* 4.9* 4.4 3.9  --              Results from last 7 days   Lab Units 12/02/23  0724 12/01/23  0643 11/30/23  0716 11/29/23  0546 11/28/23  0526   WBC 10*3/mm3 8.97 7.40 6.10 7.70 7.53   HEMOGLOBIN g/dL 8.3* 8.5* 8.3* 9.5* 9.4*   PLATELETS 10*3/mm3 238 238 240 225 201             Assessment / Plan     ASSESSMENT:  End-stage renal disease on peritoneal dialysis etiology of her ESRD is related to lupus nephritis.  PD catheter was removed today, plan for hemodialysis she had dialysis yesterday without any difficulty  hyponatremia, sodium today 129 and potassium 5.3,  Acute CVA, cortical infarct in the anterior inferior medial right frontal lobe and subacute subdural hematoma right occipital.  Ascending aortic aneurysm with subacute/chronic aortic root dissection, status postrepair of proximal aortic aneurysm 11/2/2023.  Reexploration for cardiac tamponade on 11/6/2023.  Seizure disorder appears to be stable currently on lacosamide  Anemia of chronic kidney disease hemoglobin today is 8.3  Hypomagnesemia, resolved    PLAN:  1 dose of Lokelma today  Continue fluid restriction 1000 cc per 24 hours  Recheck potassium this evening  Surveillance labs    I reviewed the chart and other  providers notes, I reviewed labs.  I discussed the case with the patient .  Copied text in this note has been reviewed and is accurate as of 12/02/23.       Thank you for involving us in the care of Dee Herrera.  Please feel free to call with any questions.    Isaac Diaz MD  12/02/23  12:21 [facility]    [facility]  [phone]    Please note that portions of this note were completed with a voice recognition program.

## 2023-12-02 NOTE — PROGRESS NOTES
HOSPITAL PROGRESS NOTE    Date of Service: 23  LOS:  LOS: 37 days   Patient Name: Dee Herrera  Age/Sex: 33 y.o. female  : 1990  MRN: [id number]  Primary Cardiologist: Dr. Juana Taylor    Subjective:     Chief Complaint/Follow up:   Aortic aneurysm, aortic regurgitation, status postrepair    Interval History:   Resting in bed.  Reports 7/10 pain in abdomen from peritoneal dialysis catheter removal yesterday.  RN notified.  She denies chest pain, shortness of breath, palpitations, dizziness, or edema.    Objective:     Objective:  Temp:  [97.3 °F (36.3 °C)-98.2 °F (36.8 °C)] 97.6 °F (36.4 °C)  Heart Rate:  [64-85] 64  Resp:  [16-20] 16  BP: (112-141)/() 132/94  Body mass index is 16.16 kg/m².  No intake or output data in the 24 hours ending 23 1239      23  0300 23  0600 23  0500   Weight: 44 kg (97 lb) 44.3 kg (97 lb 10.6 oz) 44 kg (97 lb)       Physical Exam:   General Appearance: Alert, cooperative, in no acute distress.   Neck: No JVD.  Lungs: Clear. Normal respiratory effort and rate.  Heart: Regular rate and rhythm, normal S1 and S2, no murmurs, gallops or rubs.  Extremities: No edema.   Abdomen.  Bloated.  Dressing clean dry and intact.    Lab Review:   Results from last 7 days   Lab Units 23  0724 23  0643   SODIUM mmol/L 129* 128*   POTASSIUM mmol/L 5.3* 5.5*   CHLORIDE mmol/L 96* 91*   CO2 mmol/L 23.2 24.4   BUN mg/dL 27* 37*   CREATININE mg/dL 3.38* 5.40*   GLUCOSE mg/dL 89 73   CALCIUM mg/dL 8.8 8.9         Results from last 7 days   Lab Units 23  0724 23  0643   WBC 10*3/mm3 8.97 7.40   HEMOGLOBIN g/dL 8.3* 8.5*   HEMATOCRIT % 26.4* 26.1*   PLATELETS 10*3/mm3 238 238         Results from last 7 days   Lab Units 23  0716 23  0546   MAGNESIUM mg/dL 1.9 2.1                   Results for orders placed during the hospital encounter of 10/26/23    Adult Transthoracic Echo Limited W/ Cont if Necessary Per  Protocol    Interpretation Summary    Limited echo to look for pericardial effusion    Left ventricular systolic function is hyperdynamic (EF > 70%). Left ventricular ejection fraction appears to be greater than 70%.    Left ventricular wall thickness is consistent with severe concentric hypertrophy.    There is a small (<1cm) pericardial effusion with right ventricle diastolic collapse present. There is evidence of cardiac tamponade.    There appears to be collapse of the right ventricle however no free-flowing fluid.    Spoke with Dr Taylor    I reviewed the patient's new clinical results.  I personally viewed and interpreted the patient's EKG/Telemetry data/Labs/Test Results.     Current Medications:   Scheduled Meds:  apixaban, 5 mg, Oral, Q12H  aspirin, 81 mg, Nasogastric, Daily  carvedilol, 25 mg, Nasogastric, Q12H  chlorhexidine, 15 mL, Mouth/Throat, Q12H  escitalopram, 10 mg, Nasogastric, Daily  First Mouthwash (Magic Mouthwash), 10 mL, Swish & Spit, Q6H  hydroxychloroquine, 200 mg, Oral, Daily  insulin regular, 2-7 Units, Subcutaneous, Q6H  Lacosamide, 100 mg, Intravenous, Q12H  lactulose, 10 g, Nasogastric, TID  lidocaine, 10 mL, Subcutaneous, Once  metoclopramide, 5 mg, Intravenous, Q6H  mupirocin, , Each Nare, BID  mycophenolate, 250 mg, Oral, Q12H  ondansetron, 4 mg, Oral, TID AC  pantoprazole, 40 mg, Intravenous, Q AM  sodium zirconium cyclosilicate, 10 g, Oral, Once      Continuous Infusions:  sodium chloride, 75 mL/hr, Last Rate: 75 mL/hr (12/01/23 1035)        Allergies:  Allergies   Allergen Reactions    Minoxidil Hives and Other (See Comments)     Pericardial effusion .       Assessment:       Dissecting ascending aortic aneurysm    Vitamin D deficiency    Thrombocytopenia    Hypertension secondary to other renal disorders    Pancytopenia    Systemic lupus erythematosus    Pericardial effusion    End stage renal disease on dialysis    Seizure disorder    Elevated liver function tests    Essential  hypertension    Peritoneal dialysis catheter in place    Anemia due to chronic kidney disease, on chronic dialysis    Hyponatremia    Poor appetite    Moderate malnutrition    Chronic diastolic CHF (congestive heart failure)    Aortic valve lesion    Severe aortic valve regurgitation    Recent cerebrovascular accident (CVA)        Plan:   Assessment & Plan       1.  Ascending aortic aneurysm with subacute/chronic aortic root dissection.  Status post aneurysm repair and replacement 11/2.  2.  Severe aortic regurgitation due to aneurysm.  Status postrepair.  3.  Cardiac tamponade secondary to pericardial thrombus anteriorly and compressing right ventricle.  Requiring reexploration with clot removal and treatment of small amount of bleeding at the suture line on 11/6.  4.  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  On hemodialysis.  PD catheter removed 12/1.  Hyponatremic.  5.  Hypertension.  Well-controlled.  6.  Chronic anemia.  7.  Systemic lupus erythematosus.  8.  Right MCA stroke confirmed by MRI.  9.  Right IJ DVT subacute.  Felt to be provoked by dialysis catheter.  Vascular surgery recommended 6 months of anticoagulation with apixaban.    10.  Seizures started postoperatively.  11.  Depression.  12.  Pain from peritoneal dialysis catheter removal.  RN notified.      CAESAR Thrasher  [facility]   12/02/23  12:39 EST

## 2023-12-02 NOTE — PROGRESS NOTES
Name: Dee Herrera ADMIT: 10/26/2023   : 1990  PCP: Margarita Woods APRN    MRN: [id number] LOS: 37 days   AGE/SEX: 33 y.o. female  ROOM: [facility]     Subjective   Subjective   Patient is seen at bedside, no new complaints.       Objective   Objective   Vital Signs  Temp:  [97.6 °F (36.4 °C)-98.1 °F (36.7 °C)] 98.1 °F (36.7 °C)  Heart Rate:  [64-85] 74  Resp:  [16-18] 16  BP: (125-160)/() 134/105  SpO2:  [100 %] 100 %  on   ;   Device (Oxygen Therapy): room air  Body mass index is 16.16 kg/m².  Physical Exam  General, awake and alert.  Ill-appearing female  Head and ENT, normocephalic and atraumatic.  Lungs, symmetric expansion, equal air entry bilaterally.  Heart, regular rate and rhythm.  Abdomen, soft and nontender.  Extremities, no clubbing or cyanosis.  Neuro, no focal deficits.  Skin: Warm and no rash.  Psych, normal mood and affect.  Musculoskeletal, joint examination is grossly normal.     Copied text material from yesterday's note has been reviewed for appropriate changes and remains accurate as of 23.          Results Review     I reviewed the patient's new clinical results.  Results from last 7 days   Lab Units 23  0723  0643 23  0716 23  0546   WBC 10*3/mm3 8.97 7.40 6.10 7.70   HEMOGLOBIN g/dL 8.3* 8.5* 8.3* 9.5*   PLATELETS 10*3/mm3 238 238 240 225     Results from last 7 days   Lab Units 23  0724 23  0643 23  0716 23  0546   SODIUM mmol/L 129* 128* 127* 124*   POTASSIUM mmol/L 5.3* 5.5* 4.5 4.1   CHLORIDE mmol/L 96* 91* 94* 86*   CO2 mmol/L 23.2 24.4 25.3 24.3   BUN mg/dL 27* 37* 26* 50*   CREATININE mg/dL 3.38* 5.40* 3.51* 5.49*   GLUCOSE mg/dL 89 73 80 92   EGFR mL/min/1.73 17.9* 10.2* 17.2* 10.0*     Results from last 7 days   Lab Units 23  0724 23  0643 23  0716 23  0546   ALBUMIN g/dL 2.3* 2.1* 2.0* 2.1*     Results from last 7 days   Lab Units 23  0724 23  0643 23  0716  11/29/23  0546 11/28/23  1830 11/28/23  0526   CALCIUM mg/dL 8.8 8.9 8.7 8.9  --  8.9   ALBUMIN g/dL 2.3* 2.1* 2.0* 2.1*  --  2.3*   MAGNESIUM mg/dL  --   --  1.9 2.1 2.4 1.4*   PHOSPHORUS mg/dL 4.6* 4.9* 4.4 3.9  --  4.2       Glucose   Date/Time Value Ref Range Status   12/02/2023 1718 91 70 - 130 mg/dL Final   12/02/2023 1152 103 70 - 130 mg/dL Final   12/02/2023 0614 93 70 - 130 mg/dL Final   12/02/2023 0204 122 70 - 130 mg/dL Final   12/01/2023 1303 84 70 - 130 mg/dL Final   12/01/2023 0616 85 70 - 130 mg/dL Final   12/01/2023 0011 81 70 - 130 mg/dL Final       No radiology results for the last day    I have personally reviewed all medications:  Scheduled Medications  apixaban, 5 mg, Oral, Q12H  aspirin, 81 mg, Nasogastric, Daily  carvedilol, 25 mg, Nasogastric, Q12H  chlorhexidine, 15 mL, Mouth/Throat, Q12H  escitalopram, 10 mg, Nasogastric, Daily  First Mouthwash (Magic Mouthwash), 10 mL, Swish & Spit, Q6H  hydroxychloroquine, 200 mg, Oral, Daily  insulin regular, 2-7 Units, Subcutaneous, Q6H  Lacosamide, 100 mg, Intravenous, Q12H  lactulose, 10 g, Nasogastric, TID  lidocaine, 10 mL, Subcutaneous, Once  metoclopramide, 5 mg, Intravenous, Q6H  mupirocin, , Each Nare, BID  mycophenolate, 250 mg, Oral, Q12H  ondansetron, 4 mg, Oral, TID AC  pantoprazole, 40 mg, Intravenous, Q AM    Infusions  sodium chloride, 75 mL/hr, Last Rate: 75 mL/hr (12/01/23 1035)    Diet  Diet: Regular/House Diet; Texture: Regular Texture (IDDSI 7); Fluid Consistency: Thin (IDDSI 0)    I have personally reviewed:  [x]  Laboratory   [x]  Microbiology   [x]  Radiology   [x]  EKG/Telemetry  [x]  Cardiology/Vascular   []  Pathology    []  Records       Assessment/Plan     Active Hospital Problems    Diagnosis  POA    **Dissecting ascending aortic aneurysm [I71.010]  Yes    Recent cerebrovascular accident (CVA) [Z86.73]  Yes    Aortic valve lesion [I35.8]  Yes    Severe aortic valve regurgitation [I35.1]  Yes    Hyponatremia [E87.1]  Yes     Poor appetite [R63.0]  Yes    Moderate malnutrition [E44.0]  Yes    Chronic diastolic CHF (congestive heart failure) [I50.32]  Yes    Anemia due to chronic kidney disease, on chronic dialysis [N18.6, D63.1, Z99.2]  Not Applicable    Peritoneal dialysis catheter in place [Z99.2]  Not Applicable    Essential hypertension [I10]  Yes    End stage renal disease on dialysis [N18.6, Z99.2]  Not Applicable    Seizure disorder [G40.909]  Yes    Elevated liver function tests [R79.89]  Yes    Pericardial effusion [I31.39]  Yes    Systemic lupus erythematosus [M32.9]  Yes    Thrombocytopenia [D69.6]  Yes    Hypertension secondary to other renal disorders [I15.1]  Yes    Pancytopenia [D61.818]  Yes    Vitamin D deficiency [E55.9]  Yes      Resolved Hospital Problems    Diagnosis Date Resolved POA    Abdominal pain [R10.9] 10/28/2023 Yes       33 y.o. female admitted with Dissecting ascending aortic aneurysm.    Assessment and plan  1.  End-stage renal disease, dependent on peritoneal dialysis, plan is now to switch to hemodialysis per nephrology, vascular surgery on board.      2.  Acute CVA, seizures, status post neurology evaluation, continue current medications.     3.  Severe protein calorie malnutrition, continue nutrition.     4.  History of SLE, chronic condition.     5.  Hyponatremia, monitor sodium trend.     6.  CODE STATUS is full code.  Further plans based on hospital course.         Torres Gay MD  [facility]  12/02/23  18:03 EST

## 2023-12-02 NOTE — THERAPY RE-EVALUATION
Patient Name: Dee Herrera  : 1990    MRN: [id number]                              Today's Date: 2023       Admit Date: 10/26/2023    Visit Dx:     ICD-10-CM ICD-9-CM   1. Shortness of breath  R06.02 786.05   2. ESRD (end stage renal disease) on dialysis  N18.6 585.6    Z99.2 V45.11   3. Hyponatremia  E87.1 276.1   4. Generalized abdominal pain  R10.84 789.07   5. Elevated lactic acid level  R79.89 276.2   6. Elevated troponin  R79.89 790.6   7. Severe aortic valve regurgitation  I35.1 424.1   8. Pericardial effusion  I31.39 423.9   9. S/P AVR  Z95.2 V43.3   10. Recent cerebrovascular accident (CVA)  Z86.73 V49.89   11. Peritoneal dialysis catheter in place  Z99.2 V45.11   12. End stage renal disease on dialysis  N18.6 585.6    Z99.2 V45.11     Patient Active Problem List   Diagnosis    Vitamin D deficiency    Iron deficiency anemia    Morning stiffness of joints    Iron deficiency anemia, unspecified iron deficiency anemia type    Thrombocytopenia    Acute renal failure (ARF)    Hypertension secondary to other renal disorders    Pancytopenia    Hypoalbuminemia    Volume overload    Ear drainage right    T.T.P. syndrome    Systemic lupus erythematosus    Lupus nephritis, ISN/RPS class IV    Hypokalemia    Hypocalcemia    COVID-19    Hospital discharge follow-up    Stage 5 chronic kidney disease    Cardiac cirrhosis    Pancreatitis    Duodenitis    Regional enteritis of small bowel    Pericardial effusion    End stage renal disease on dialysis    Hemodialysis status    Seizure disorder    Elevated liver function tests    C. difficile colitis    Anemia, chronic disease    Essential hypertension    Peritoneal dialysis catheter in place    Anemia due to chronic kidney disease, on chronic dialysis    Alternating constipation and diarrhea    Abnormal stress test    Hyponatremia    Poor appetite    Moderate malnutrition    Chronic diastolic CHF (congestive heart failure)    Aortic valve lesion    Severe  aortic valve regurgitation    Dissecting ascending aortic aneurysm    Recent cerebrovascular accident (CVA)     Past Medical History:   Diagnosis Date    Anasarca     PER CT SCAN    Dry skin     ESRD (end stage renal disease) on dialysis     MARCO COLE, SAT UMAIR FRASER    History of abdominal pain     History of anemia     History of transfusion     Hypertension     Iron deficiency anemia 09/27/2021    Lupus (systemic lupus erythematosus) 07/30/2022    Migraine     Other specified nutritional anemias     Pericardial effusion     Renal insufficiency     Seizures     STATES LAST WAS 1/2023    Shortness of breath     OCCASIONAL    Vitamin D deficiency 09/27/2021     Past Surgical History:   Procedure Laterality Date    ASCENDING AORTIC ANEURYSM REPAIR W/ MECHANICAL AORTIC VALVE REPLACEMENT N/A 11/2/2023    Procedure: HCETNA STERNOTOMY, AORTIC ROOT REPLACEMENT WITH VALVE SPARING MISHEL PROCEDURE, REPLACEMENT OF ASCENDING AORTA, RIGHT FEMORAL DIALYSIS CATHETER PLACEMENT AND PRP;  Surgeon: Chris Medina MD;  Location: [facility] CVOR;  Service: Cardiothoracic;  Laterality: N/A;    CARDIAC CATHETERIZATION N/A 06/14/2023    Procedure: Coronary angiography;  Surgeon: Juana Taylor MD;  Location: [facility] CATH INVASIVE LOCATION;  Service: Cardiovascular;  Laterality: N/A;    CARDIAC CATHETERIZATION N/A 06/14/2023    Procedure: Left heart cath;  Surgeon: Juana Taylor MD;  Location: [facility] CATH INVASIVE LOCATION;  Service: Cardiovascular;  Laterality: N/A;    CARDIAC CATHETERIZATION N/A 06/14/2023    Procedure: Right Heart Cath;  Surgeon: Juana Taylor MD;  Location: [facility] CATH INVASIVE LOCATION;  Service: Cardiovascular;  Laterality: N/A;    COLONOSCOPY N/A 7/20/2023    Procedure: COLONOSCOPY to cecum with biopsy;  Surgeon: Drew Kaminski MD;  Location: [facility] ENDOSCOPY;  Service: Gastroenterology;  Laterality: N/A;  PRE - diarrhea, constipation  POST - fair prep, normal    CORONARY ARTERY BYPASS GRAFT N/A  11/6/2023    Procedure: STERNAL EXPLORATION AND WASH OUT;  Surgeon: Jr Mitesh Quiroz MD;  Location: [facility] CVOR;  Service: Cardiothoracic;  Laterality: N/A;    ENDOSCOPY N/A 7/20/2023    Procedure: ESOPHAGOGASTRODUODENOSCOPY with biopsy;  Surgeon: Drew Kaminski MD;  Location: [facility] ENDOSCOPY;  Service: Gastroenterology;  Laterality: N/A;  PRE - abn ct abd  POST - gastritis    INSERTION HEMODIALYSIS CATHETER N/A 07/26/2022    Procedure: RIGHT TUNNELED DIALYSIS CATHETER PLACEMENT;  Surgeon: Diandra Adhikari MD;  Location: [facility] MAIN OR;  Service: Vascular;  Laterality: N/A;    INSERTION HEMODIALYSIS CATHETER Left 11/29/2023    Procedure: TUNNELED DIALYSIS CATHETER PLACEMENT;  Surgeon: Jose Patel II, MD;  Location: [facility] MAIN OR;  Service: Vascular;  Laterality: Left;    INSERTION PERITONEAL DIALYSIS CATHETER N/A 04/03/2023    Procedure: INSERTION PERITONEAL DIALYSIS CATHETER LAPAROSCOPIC, omentumpexy;  Surgeon: Jemal Loyola MD;  Location: [facility] MAIN OR;  Service: General;  Laterality: N/A;    TONSILLECTOMY        General Information       Row Name 12/02/23 1134          Physical Therapy Time and Intention    Document Type therapy note (daily note);re-evaluation  -     Mode of Treatment individual therapy;physical therapy  -       Row Name 12/02/23 1134          General Information    Patient Profile Reviewed yes  -       Row Name 12/02/23 1134          Cognition    Orientation Status (Cognition) disoriented to;time;situation  unsure of date and day, unaware if she had a procedure yesterday  -       Row Name 12/02/23 1134          Safety Issues, Functional Mobility    Safety Issues Affecting Function (Mobility) judgment;safety precaution awareness  -     Comment, Safety Issues/Impairments (Mobility) gt belt, nonskid socks  -               User Key  (r) = Recorded By, (t) = Taken By, (c) = Cosigned By      Initials Name Provider Type    Cynthia Miller, PT Physical Therapist                    Mobility       Row Name 12/02/23 1135          Bed Mobility    Bed Mobility supine-sit;sit-supine  -DJ     Supine-Sit [place] (Bed Mobility) contact guard;verbal cues  -DJ     Sit-Supine [place] (Bed Mobility) contact guard;minimum assist (75% patient effort)  to raise legs back into bed  -DJ       Row Name 12/02/23 1135          Transfers    Comment, (Transfers) sit/stand from EOB  -DJ       Row Name 12/02/23 1135          Bed-Chair Transfer    Bed-Chair [place] (Transfers) not tested  -DJ       Row Name 12/02/23 1135          Sit-Stand Transfer    Sit-Stand [place] (Transfers) contact guard;verbal cues  -DJ     Assistive Device (Sit-Stand Transfers) walker, front-wheeled  -DJ       Row Name 12/02/23 1135          Gait/Stairs (Locomotion)    [place] Level (Gait) contact guard;verbal cues  -DJ     Assistive Device (Gait) walker, front-wheeled  -DJ     Distance in Feet (Gait) 80'  -DJ     Deviations/Abnormal Patterns (Gait) amrita decreased;gait speed decreased;stride length decreased;base of support, narrow  -DJ     Bilateral Gait Deviations heel strike decreased;forward flexed posture  -DJ     [place] Level (Stairs) not tested  -DJ     Comment, (Gait/Stairs) Pt amb 80' with r wx and CGA - very slow pace, guarded posture, decreased step length and narrow JOSLYN, endurance improving but still limits further amb distance, no LOB.  -DJ               User Key  (r) = Recorded By, (t) = Taken By, (c) = Cosigned By      Initials Name Provider Type    DJ Cynthia Villalobos, PT Physical Therapist                   Obj/Interventions       Row Name 12/02/23 1138          Motor Skills    Motor Skills functional endurance  -DJ     Functional Endurance poor but slowly improving  -DJ     Therapeutic Exercise other (see comments)  AP, seated marching  -DJ       Row Name 12/02/23 1138          Balance    Balance Assessment standing static balance;standing dynamic balance  -DJ     Static  Standing Balance contact guard;verbal cues  -DJ     Dynamic Standing Balance contact guard;verbal cues  -DJ     Position/Device Used, Standing Balance walker, front-wheeled;supported  -DJ     Balance Interventions standing;sitting;sit to stand;supported;weight shifting activity  -DJ     Comment, Balance no LOB  -DJ               User Key  (r) = Recorded By, (t) = Taken By, (c) = Cosigned By      Initials Name Provider Type    Cynthia Miller, PT Physical Therapist                   Goals/Plan       Row Name 12/02/23 1139          Bed Mobility Goal 1 (PT)    Time Frame (Bed Mobility Goal 1, PT) 10 days  -DJ     Progress/Outcomes (Bed Mobility Goal 1, PT) continuing progress toward goal;goal ongoing  -DJ       Row Name 12/02/23 1139          Transfer Goal 1 (PT)    Time Frame (Transfer Goal 1, PT) 10 days  -DJ     Progress/Outcome (Transfer Goal 1, PT) continuing progress toward goal;goal ongoing  -DJ       Row Name 12/02/23 1139          Gait Training Goal 1 (PT)    Time Frame (Gait Training Goal 1, PT) 10 days  -DJ     Progress/Outcome (Gait Training Goal 1, PT) continuing progress toward goal;goal ongoing  -DJ       Row Name 12/02/23 1139          Problem Specific Goal 1 (PT)    Time Frame (Problem Specific Goal 1, PT) 1 week  -DJ     Progress/Outcome (Problem Specific Goal 1, PT) continuing progress toward goal;goal ongoing  -DJ       Row Name 12/02/23 1139          Patient Education Goal (PT)    Time Frame (Patient Education Goal, PT) 10 days  -DJ               User Key  (r) = Recorded By, (t) = Taken By, (c) = Cosigned By      Initials Name Provider Type    Cynthia Miller, PT Physical Therapist                   Clinical Impression       Row Name 12/02/23 1144          Pain    Pretreatment Pain Rating 4/10  -DJ     Pain Location - abdomen  -DJ     Pre/Posttreatment Pain Comment Pt c/o stomach pain that increases with sitting EOB  -DJ     Pain Intervention(s) Repositioned;Rest  -DJ       Row Name 12/02/23 1144           Plan of Care Review    Plan of Care Reviewed With patient  -DJ     Progress improving  -DJ     Outcome Evaluation Pt resting in bed in NAD, unaware of what day/date it is or if she had a procedure yesterday. Pt req encouragement to participate with therapy adn c/o abd pain = 4/10 that increases with movement. Pt req CGA to sit EOB. She performed seated LE ther ex with vc for form. She stood from EOB with CGA-min A using r wx. Pt amb 80' with r wx and CGA - very slow pace, guarded posture, decreased step length and narrow JOSLYN, endurance improving but still limits further amb distance, no LOB. Pt returned supine in bed with CGA - min A to raise legs back into bed. Pt progressing slowly toward goals - still limited by generalized weakness and decreased activity tolerance from prolonged immobility. Cont PT to address functional deficits and prepare for d/c as appropriate  -DJ       Row Name 12/02/23 1144          Therapy Assessment/Plan (PT)    Therapy Frequency (PT) 5 times/wk  -DJ       Row Name 12/02/23 1144          Vital Signs    O2 Delivery Pre Treatment room air  no O2 monitor on pt  -DJ     O2 Delivery Intra Treatment room air  -DJ     O2 Delivery Post Treatment room air  -DJ     Pre Patient Position Supine  -DJ     Intra Patient Position Standing  -DJ     Post Patient Position Supine  -DJ       Row Name 12/02/23 1144          Positioning and Restraints    Pre-Treatment Position in bed  -DJ     Post Treatment Position bed  -DJ     In Bed supine;call light within reach;encouraged to call for assist;exit alarm on  -DJ               User Key  (r) = Recorded By, (t) = Taken By, (c) = Cosigned By      Initials Name Provider Type    Cynthia Miller, PT Physical Therapist                   Outcome Measures       Row Name 12/02/23 1148 12/02/23 0840       How much help from another person do you currently need...    Turning from your back to your side while in flat bed without using bedrails? 4  -DJ 3  -LT     Moving from lying on back to sitting on the side of a flat bed without bedrails? 3  -DJ 3  -LT    Moving to and from a bed to a chair (including a wheelchair)? 3  -DJ 3  -LT    Standing up from a chair using your arms (e.g., wheelchair, bedside chair)? 3  -DJ 2  -LT    Climbing 3-5 steps with a railing? 2  -DJ 2  -LT    To walk in hospital room? 2  -DJ 2  -LT    AM-PAC 6 Clicks Score (PT) 17  -DJ 15  -LT    Highest Level of Mobility Goal 5 --> Static standing  -DJ 4 --> Transfer to chair/commode  -LT              User Key  (r) = Recorded By, (t) = Taken By, (c) = Cosigned By      Initials Name Provider Type    Cynthia Miller, PT Physical Therapist    LT Dora Lara RN Registered Nurse                                 Physical Therapy Education       Title: PT OT SLP Therapies (In Progress)       Topic: Physical Therapy (Done)       Point: Mobility training (Done)       Learning Progress Summary             Patient Acceptance, E, VU,NR by CARMEN at 12/2/2023 1149    Acceptance, E,TB,D, VU,DU by PH at 11/30/2023 1359    Acceptance, E, DU by JY at 11/28/2023 1448    Acceptance, E,TB,D, VU,NR by  at 11/24/2023 1535    Acceptance, E, VU by SK at 11/21/2023 1609    Acceptance, E,TB,D, VU by SK at 11/20/2023 1237    Acceptance, E, NR by  at 11/19/2023 1424    Acceptance, E,TB,D, VU by SK at 11/17/2023 1633    Acceptance, E,D, NR by PC at 11/16/2023 1618    Acceptance, E,TB,D, VU by SK at 11/15/2023 1521    Acceptance, E, VU,NR by SK at 11/13/2023 1247    Acceptance, E,TB,D, VU,NR by  at 11/12/2023 1148   Family Acceptance, E, VU by SK at 11/21/2023 1609                         Point: Home exercise program (Done)       Learning Progress Summary             Patient Acceptance, E, VU,NR by CARMEN at 12/2/2023 1149    Acceptance, E,TB,D, VU,DU by PH at 11/30/2023 1359    Acceptance, ANDREW DUNHAM by JDRAKE at 11/28/2023 1448    Acceptance, WILY DUNHAM D, VUNR by  at 11/24/2023 1535    Acceptance, CONY DUNHAM by SK at 11/21/2023 1609     Acceptance, E,TB,D, VU by SK at 11/20/2023 1237    Acceptance, E, NR by  at 11/19/2023 1424    Acceptance, E,TB,D, VU by SK at 11/17/2023 1633    Acceptance, E,D, NR by PC at 11/16/2023 1618    Acceptance, E,TB,D, VU by SK at 11/15/2023 1521    Acceptance, E,TB,D, VU,NR by  at 11/12/2023 1148   Family Acceptance, E, VU by SK at 11/21/2023 1609                         Point: Body mechanics (Done)       Learning Progress Summary             Patient Acceptance, E, VU,NR by CARMEN at 12/2/2023 1149    Acceptance, E,TB,D, VU,DU by SILVESTRE at 11/30/2023 1359    Acceptance, E, DU by FOUZIA at 11/28/2023 1448    Acceptance, E,TB,D, VU,NR by  at 11/24/2023 1535    Acceptance, E, VU by SK at 11/21/2023 1609    Acceptance, E,TB,D, VU by SK at 11/20/2023 1237    Acceptance, E, NR by  at 11/19/2023 1424    Acceptance, E,TB,D, VU by SK at 11/17/2023 1633    Acceptance, E,D, NR by  at 11/16/2023 1618    Acceptance, E,TB,D, VU by SK at 11/15/2023 1521    Acceptance, E, VU,NR by SK at 11/13/2023 1247    Acceptance, E,TB,D, VU,NR by  at 11/12/2023 1148   Family Acceptance, E, VU by SK at 11/21/2023 1609                         Point: Precautions (Done)       Learning Progress Summary             Patient Acceptance, E, VU,NR by CARMEN at 12/2/2023 1149    Acceptance, E,TB,D, VU,DU by PH at 11/30/2023 1359    Acceptance, E, DU by FOUZIA at 11/28/2023 1448    Acceptance, E, VU by SK at 11/21/2023 1609    Acceptance, E,TB,D, VU by SK at 11/20/2023 1237    Acceptance, E, NR by  at 11/19/2023 1424    Acceptance, E,TB,D, VU by SK at 11/17/2023 1633    Acceptance, E,D, NR by  at 11/16/2023 1618    Acceptance, E,TB,D, VU by SK at 11/15/2023 1521    Acceptance, E, VU,NR by SK at 11/13/2023 1247    Acceptance, E,TB,D, VU,NR by  at 11/12/2023 1148   Family Acceptance, E, VU by SK at 11/21/2023 1609                                         User Key       Initials Effective Dates Name Provider Type Discipline     06/16/21 -  Kim Almendarez S, PT  Physical Therapist PT    PC 06/16/21 -  Elba Whaley, PT Physical Therapist PT    EE 06/16/21 -  Shea Ceja, PT Physical Therapist PT    PH 06/16/21 -  Vero Erwin, PTA Physical Therapist Assistant PT    DJ 10/25/19 -  Cynthia Villalobos, PT Physical Therapist PT    AH 08/25/23 -  Nae Chaudhary, RN Registered Nurse Nurse    SK 10/10/23 -  Supriya Harris, PT Student PT Student PT    JY 11/08/23 -  Blake Garcia, PTA Student PTA Student PT                  PT Recommendation and Plan     Plan of Care Reviewed With: patient  Progress: improving  Outcome Evaluation: Pt resting in bed in NAD, unaware of what day/date it is or if she had a procedure yesterday. Pt req encouragement to participate with therapy adn c/o abd pain = 4/10 that increases with movement. Pt req CGA to sit EOB. She performed seated LE ther ex with vc for form. She stood from EOB with CGA-min A using r wx. Pt amb 80' with r wx and CGA - very slow pace, guarded posture, decreased step length and narrow JOSLYN, endurance improving but still limits further amb distance, no LOB. Pt returned supine in bed with CGA - min A to raise legs back into bed. Pt progressing slowly toward goals - still limited by generalized weakness and decreased activity tolerance from prolonged immobility. Cont PT to address functional deficits and prepare for d/c as appropriate     Time Calculation:   PT Evaluation Complexity  History, PT Evaluation Complexity: 3 or more personal factors and/or comorbidities  Examination of Body Systems (PT Eval Complexity): total of 4 or more elements  Clinical Presentation (PT Evaluation Complexity): evolving  Clinical Decision Making (PT Evaluation Complexity): moderate complexity  Overall Complexity (PT Evaluation Complexity): moderate complexity     PT Charges       Row Name 12/02/23 1151             Time Calculation    Start Time 1112  -DJ      Stop Time 1131  -DJ      Time Calculation (min) 19 min  -DJ      PT Non-Billable Time  (min) 10 min  -DJ      PT Received On 12/02/23  -CARMEN      PT - Next Appointment 12/04/23  -CARMEN      PT Goal Re-Cert Due Date 12/12/23  -CARMEN                User Key  (r) = Recorded By, (t) = Taken By, (c) = Cosigned By      Initials Name Provider Type    Cynthia Miller PT Physical Therapist                  Therapy Charges for Today       Code Description Service Date Service Provider Modifiers Qty    [id number]  PT RE-EVAL ESTABLISHED PLAN 2 12/2/2023 Cynthia Villalobos, PT GP 1    [id number]  PT THERAPEUTIC ACT EA 15 MIN 12/2/2023 Cynthia Villalobos, PT GP 1            PT G-Codes  Outcome Measure Options: AM-PAC 6 Clicks Basic Mobility (PT)  AM-PAC 6 Clicks Score (PT): 17  AM-PAC 6 Clicks Score (OT): 17  Modified Linda Scale: 4 - Moderately severe disability.  Unable to walk without assistance, and unable to attend to own bodily needs without assistance.       Cynthia Villalobos PT  12/2/2023

## 2023-12-03 LAB
ALBUMIN SERPL-MCNC: 2.2 G/DL (ref 3.5–5.2)
ANION GAP SERPL CALCULATED.3IONS-SCNC: 11.2 MMOL/L (ref 5–15)
BASOPHILS # BLD AUTO: 0.01 10*3/MM3 (ref 0–0.2)
BASOPHILS NFR BLD AUTO: 0.1 % (ref 0–1.5)
BUN SERPL-MCNC: 41 MG/DL (ref 6–20)
BUN/CREAT SERPL: 8.3 (ref 7–25)
CALCIUM SPEC-SCNC: 8.7 MG/DL (ref 8.6–10.5)
CHLORIDE SERPL-SCNC: 98 MMOL/L (ref 98–107)
CO2 SERPL-SCNC: 21.8 MMOL/L (ref 22–29)
CREAT SERPL-MCNC: 4.93 MG/DL (ref 0.57–1)
DEPRECATED RDW RBC AUTO: 44.5 FL (ref 37–54)
EGFRCR SERPLBLD CKD-EPI 2021: 11.4 ML/MIN/1.73
EOSINOPHIL # BLD AUTO: 0.07 10*3/MM3 (ref 0–0.4)
EOSINOPHIL NFR BLD AUTO: 0.7 % (ref 0.3–6.2)
ERYTHROCYTE [DISTWIDTH] IN BLOOD BY AUTOMATED COUNT: 14.4 % (ref 12.3–15.4)
GEN 5 2HR TROPONIN T REFLEX: 186 NG/L
GLUCOSE BLDC GLUCOMTR-MCNC: 116 MG/DL (ref 70–130)
GLUCOSE BLDC GLUCOMTR-MCNC: 134 MG/DL (ref 70–130)
GLUCOSE BLDC GLUCOMTR-MCNC: 89 MG/DL (ref 70–130)
GLUCOSE BLDC GLUCOMTR-MCNC: 93 MG/DL (ref 70–130)
GLUCOSE SERPL-MCNC: 84 MG/DL (ref 65–99)
HCT VFR BLD AUTO: 23.2 % (ref 34–46.6)
HGB BLD-MCNC: 7.5 G/DL (ref 12–15.9)
IMM GRANULOCYTES # BLD AUTO: 0.07 10*3/MM3 (ref 0–0.05)
IMM GRANULOCYTES NFR BLD AUTO: 0.7 % (ref 0–0.5)
LYMPHOCYTES # BLD AUTO: 0.55 10*3/MM3 (ref 0.7–3.1)
LYMPHOCYTES NFR BLD AUTO: 5.4 % (ref 19.6–45.3)
MAGNESIUM SERPL-MCNC: 1.9 MG/DL (ref 1.6–2.6)
MAGNESIUM SERPL-MCNC: 2 MG/DL (ref 1.6–2.6)
MCH RBC QN AUTO: 27.6 PG (ref 26.6–33)
MCHC RBC AUTO-ENTMCNC: 32.3 G/DL (ref 31.5–35.7)
MCV RBC AUTO: 85.3 FL (ref 79–97)
MONOCYTES # BLD AUTO: 0.57 10*3/MM3 (ref 0.1–0.9)
MONOCYTES NFR BLD AUTO: 5.6 % (ref 5–12)
NEUTROPHILS NFR BLD AUTO: 87.5 % (ref 42.7–76)
NEUTROPHILS NFR BLD AUTO: 9 10*3/MM3 (ref 1.7–7)
NRBC BLD AUTO-RTO: 0 /100 WBC (ref 0–0.2)
PHOSPHATE SERPL-MCNC: 4.8 MG/DL (ref 2.5–4.5)
PLATELET # BLD AUTO: 209 10*3/MM3 (ref 140–450)
PMV BLD AUTO: 10.3 FL (ref 6–12)
POTASSIUM SERPL-SCNC: 5.1 MMOL/L (ref 3.5–5.2)
POTASSIUM SERPL-SCNC: 5.5 MMOL/L (ref 3.5–5.2)
QT INTERVAL: 372 MS
QTC INTERVAL: 458 MS
RBC # BLD AUTO: 2.72 10*6/MM3 (ref 3.77–5.28)
SODIUM SERPL-SCNC: 131 MMOL/L (ref 136–145)
TROPONIN T DELTA: -8 NG/L
TROPONIN T SERPL HS-MCNC: 194 NG/L
WBC NRBC COR # BLD AUTO: 10.27 10*3/MM3 (ref 3.4–10.8)

## 2023-12-03 PROCEDURE — 93010 ELECTROCARDIOGRAM REPORT: CPT | Performed by: INTERNAL MEDICINE

## 2023-12-03 PROCEDURE — 25010000002 METOCLOPRAMIDE PER 10 MG: Performed by: SURGERY

## 2023-12-03 PROCEDURE — C9254 INJECTION, LACOSAMIDE: HCPCS | Performed by: SURGERY

## 2023-12-03 PROCEDURE — 84484 ASSAY OF TROPONIN QUANT: CPT | Performed by: INTERNAL MEDICINE

## 2023-12-03 PROCEDURE — 83735 ASSAY OF MAGNESIUM: CPT | Performed by: INTERNAL MEDICINE

## 2023-12-03 PROCEDURE — 25010000002 LACOSAMIDE 200 MG/20ML SOLUTION: Performed by: SURGERY

## 2023-12-03 PROCEDURE — 84132 ASSAY OF SERUM POTASSIUM: CPT | Performed by: INTERNAL MEDICINE

## 2023-12-03 PROCEDURE — 99232 SBSQ HOSP IP/OBS MODERATE 35: CPT | Performed by: NURSE PRACTITIONER

## 2023-12-03 PROCEDURE — 80069 RENAL FUNCTION PANEL: CPT | Performed by: INTERNAL MEDICINE

## 2023-12-03 PROCEDURE — 85025 COMPLETE CBC W/AUTO DIFF WBC: CPT | Performed by: INTERNAL MEDICINE

## 2023-12-03 PROCEDURE — 63710000001 ONDANSETRON PER 8 MG: Performed by: SURGERY

## 2023-12-03 PROCEDURE — 82948 REAGENT STRIP/BLOOD GLUCOSE: CPT

## 2023-12-03 PROCEDURE — 93005 ELECTROCARDIOGRAM TRACING: CPT | Performed by: INTERNAL MEDICINE

## 2023-12-03 RX ORDER — MANNITOL 250 MG/ML
12.5 INJECTION, SOLUTION INTRAVENOUS AS NEEDED
Status: CANCELLED | OUTPATIENT
Start: 2023-12-04 | End: 2023-12-04

## 2023-12-03 RX ORDER — AMLODIPINE BESYLATE 5 MG/1
5 TABLET ORAL
Status: DISCONTINUED | OUTPATIENT
Start: 2023-12-03 | End: 2023-12-05

## 2023-12-03 RX ADMIN — HYDROCODONE BITARTRATE AND ACETAMINOPHEN 1 TABLET: 7.5; 325 TABLET ORAL at 10:25

## 2023-12-03 RX ADMIN — METOCLOPRAMIDE 5 MG: 5 INJECTION, SOLUTION INTRAMUSCULAR; INTRAVENOUS at 07:17

## 2023-12-03 RX ADMIN — METOCLOPRAMIDE 5 MG: 5 INJECTION, SOLUTION INTRAMUSCULAR; INTRAVENOUS at 12:54

## 2023-12-03 RX ADMIN — ONDANSETRON HYDROCHLORIDE 4 MG: 4 TABLET, FILM COATED ORAL at 12:54

## 2023-12-03 RX ADMIN — LACOSAMIDE 100 MG: 10 INJECTION INTRAVENOUS at 09:28

## 2023-12-03 RX ADMIN — ESCITALOPRAM OXALATE 10 MG: 10 TABLET, FILM COATED ORAL at 09:28

## 2023-12-03 RX ADMIN — CARVEDILOL 25 MG: 25 TABLET, FILM COATED ORAL at 09:28

## 2023-12-03 RX ADMIN — APIXABAN 5 MG: 5 TABLET, FILM COATED ORAL at 09:28

## 2023-12-03 RX ADMIN — METOCLOPRAMIDE 5 MG: 5 INJECTION, SOLUTION INTRAMUSCULAR; INTRAVENOUS at 02:41

## 2023-12-03 RX ADMIN — APIXABAN 5 MG: 5 TABLET, FILM COATED ORAL at 21:23

## 2023-12-03 RX ADMIN — SODIUM ZIRCONIUM CYCLOSILICATE 10 G: 10 POWDER, FOR SUSPENSION ORAL at 18:35

## 2023-12-03 RX ADMIN — HYDROCODONE BITARTRATE AND ACETAMINOPHEN 1 TABLET: 7.5; 325 TABLET ORAL at 02:51

## 2023-12-03 RX ADMIN — AMLODIPINE BESYLATE 5 MG: 5 TABLET ORAL at 12:54

## 2023-12-03 RX ADMIN — HYDROXYCHLOROQUINE SULFATE 200 MG: 200 TABLET ORAL at 09:28

## 2023-12-03 RX ADMIN — ASPIRIN 81 MG: 81 TABLET, CHEWABLE ORAL at 09:28

## 2023-12-03 RX ADMIN — METOCLOPRAMIDE 5 MG: 5 INJECTION, SOLUTION INTRAMUSCULAR; INTRAVENOUS at 18:35

## 2023-12-03 RX ADMIN — NITROGLYCERIN 0.4 MG: 0.4 TABLET SUBLINGUAL at 10:16

## 2023-12-03 RX ADMIN — ONDANSETRON HYDROCHLORIDE 4 MG: 4 TABLET, FILM COATED ORAL at 18:35

## 2023-12-03 RX ADMIN — LACOSAMIDE 100 MG: 10 INJECTION INTRAVENOUS at 21:23

## 2023-12-03 RX ADMIN — ONDANSETRON HYDROCHLORIDE 4 MG: 4 TABLET, FILM COATED ORAL at 07:18

## 2023-12-03 RX ADMIN — PANTOPRAZOLE SODIUM 40 MG: 40 INJECTION, POWDER, FOR SOLUTION INTRAVENOUS at 07:17

## 2023-12-03 RX ADMIN — SODIUM ZIRCONIUM CYCLOSILICATE 10 G: 10 POWDER, FOR SUSPENSION ORAL at 22:52

## 2023-12-03 RX ADMIN — CARVEDILOL 25 MG: 25 TABLET, FILM COATED ORAL at 21:23

## 2023-12-03 NOTE — PLAN OF CARE
Goal Outcome Evaluation:           Progress: improving  Outcome Evaluation: A&Ox4, forgetful. RA. Potassium elevated, lokelma given. C/o abdominal pain, PRN pain medication given. VSS.

## 2023-12-03 NOTE — PLAN OF CARE
Goal Outcome Evaluation:  Plan of Care Reviewed With: patient        Progress: improving  Outcome Evaluation: BP elevated, otherwise VSS.  Some complaints of abdominal pain, treated with PRN norco.  Pt is alert and oriented x4, forgetfull at times.  Plan is for pt to go to rehab when a bed has become available and okay with all providers.

## 2023-12-03 NOTE — PROGRESS NOTES
[facility] follow up d/t depression; this writer reviewed chart and spoke with RN Kimberlyn. Per RN, patient is doing okay, currently sleeping; some forgetfulness noted, however, no other behavioral health concerns noted at present.  Patient will continue on Lexapro; discharge plan SNF versus [facility], CCP involved. No further needs/concerns noted at this time per RN and/or medical team; [facility] to continue following.

## 2023-12-03 NOTE — PROGRESS NOTES
[facility] Progress Note      Patient Name: Dee Herrera  : 1990  MRN: [id number]  Primary Care Physician:  Margarita Woods APRN  Date of admission: 10/26/2023    Subjective     Interval History:   Follow-up end-stage renal disease.    Patient is feeling much better, denies any chest pain or shortness of air, no orthopnea or PND, no nausea or vomiting    Review of Systems:   As noted above    Objective     Vitals:   Temp:  [98.1 °F (36.7 °C)-98.4 °F (36.9 °C)] 98.2 °F (36.8 °C)  Heart Rate:  [73-75] 75  Resp:  [16] 16  BP: (119-157)/() 126/98    Intake/Output Summary (Last 24 hours) at 12/3/2023 1528  Last data filed at 12/3/2023 1340  Gross per 24 hour   Intake 400 ml   Output --   Net 400 ml         Physical Exam:    General Appearance: alert, awake, chronically ill, no acute distress   Skin: warm and dry  HEENT: Core track is in place  Neck: supple, no JVD, tunneled dialysis cath in the left IJ with exit site below the right clavicle  Lungs: Bilateral rhonchi, breathing effort not labored  Heart: RRR, normal S1 and S2, no rub  Abdomen: soft, nontender, nondistended, normoactive bowels,  Extremities: no edema, cyanosis or clubbing  Neuro: Moving all extremities    Scheduled Meds:     amLODIPine, 5 mg, Oral, Q24H  apixaban, 5 mg, Oral, Q12H  aspirin, 81 mg, Nasogastric, Daily  carvedilol, 25 mg, Nasogastric, Q12H  chlorhexidine, 15 mL, Mouth/Throat, Q12H  escitalopram, 10 mg, Nasogastric, Daily  First Mouthwash (Magic Mouthwash), 10 mL, Swish & Spit, Q6H  hydroxychloroquine, 200 mg, Oral, Daily  insulin regular, 2-7 Units, Subcutaneous, Q6H  Lacosamide, 100 mg, Intravenous, Q12H  lactulose, 10 g, Nasogastric, TID  lidocaine, 10 mL, Subcutaneous, Once  metoclopramide, 5 mg, Intravenous, Q6H  mupirocin, , Each Nare, BID  mycophenolate, 250 mg, Oral, Q12H  ondansetron, 4 mg, Oral, TID AC  pantoprazole, 40 mg, Intravenous, Q AM      IV Meds:   sodium chloride, 75 mL/hr,  Last Rate: 75 mL/hr (12/01/23 1035)        Results Reviewed:   I have personally reviewed the results from the time of this admission to 12/3/2023 15:28 EST     Results from last 7 days   Lab Units 12/03/23  1043 12/03/23  0405 12/02/23  1834 12/02/23  0724 12/01/23  0643   SODIUM mmol/L  --  131*  --  129* 128*   POTASSIUM mmol/L 5.5* 5.1 5.1 5.3* 5.5*   CHLORIDE mmol/L  --  98  --  96* 91*   CO2 mmol/L  --  21.8*  --  23.2 24.4   BUN mg/dL  --  41*  --  27* 37*   CREATININE mg/dL  --  4.93*  --  3.38* 5.40*   CALCIUM mg/dL  --  8.7  --  8.8 8.9   GLUCOSE mg/dL  --  84  --  89 73       Estimated Creatinine Clearance: 12.6 mL/min (A) (by C-G formula based on SCr of 4.93 mg/dL (H)).    Results from last 7 days   Lab Units 12/03/23  1043 12/03/23  0405 12/02/23  0724 12/01/23  0643 11/30/23  0716   MAGNESIUM mg/dL 2.0 1.9  --   --  1.9   PHOSPHORUS mg/dL  --  4.8* 4.6* 4.9* 4.4             Results from last 7 days   Lab Units 12/03/23  0405 12/02/23  0724 12/01/23  0643 11/30/23  0716 11/29/23  0546   WBC 10*3/mm3 10.27 8.97 7.40 6.10 7.70   HEMOGLOBIN g/dL 7.5* 8.3* 8.5* 8.3* 9.5*   PLATELETS 10*3/mm3 209 238 238 240 225             Assessment / Plan     ASSESSMENT:  End-stage renal disease on peritoneal dialysis etiology of her ESRD is related to lupus nephritis.  PD catheter was removed today, plan for hemodialysis she had dialysis yesterday without any difficulty  hyponatremia, sodium today 131  Acute CVA, cortical infarct in the anterior inferior medial right frontal lobe and subacute subdural hematoma right occipital.  Ascending aortic aneurysm with subacute/chronic aortic root dissection, status postrepair of proximal aortic aneurysm 11/2/2023.  Reexploration for cardiac tamponade on 11/6/2023.  Seizure disorder appears to be stable currently on lacosamide  Anemia of chronic kidney disease hemoglobin today is 7.5  Hyperkalemia potassium earlier was 5.1 and now up to 5.5    PLAN:  Give Lokelma today  Continue  fluid restriction 1000 cc per 24 hours  Hemodialysis tomorrow  Surveillance labs    I reviewed the chart and other providers notes, I reviewed labs.  I discussed the case with the patient .  Copied text in this note has been reviewed and is accurate as of 12/03/23.       Thank you for involving us in the care of Dee Herrera.  Please feel free to call with any questions.    Isaac Diaz MD  12/03/23  15:28 [facility]    [facility]  [phone]    Please note that portions of this note were completed with a voice recognition program.

## 2023-12-03 NOTE — PROGRESS NOTES
Name: Dee Herrera ADMIT: 10/26/2023   : 1990  PCP: Margarita Woods APRN    MRN: [id number] LOS: 38 days   AGE/SEX: 33 y.o. female  ROOM: [facility]     Subjective   Subjective   Patient is seen at bedside, no new complaints.       Objective   Objective   Vital Signs  Temp:  [98.1 °F (36.7 °C)-98.4 °F (36.9 °C)] 98.2 °F (36.8 °C)  Heart Rate:  [73-75] 75  Resp:  [16] 16  BP: (119-157)/() 126/98  SpO2:  [99 %-100 %] 99 %  on   ;   Device (Oxygen Therapy): room air  Body mass index is 17.67 kg/m².  Physical Exam  General, awake and alert.  Ill-appearing female  Head and ENT, normocephalic and atraumatic.  Lungs, symmetric expansion, equal air entry bilaterally.  Heart, regular rate and rhythm.  Abdomen, soft and nontender.  Extremities, no clubbing or cyanosis.  Neuro, no focal deficits.  Skin: Warm and no rash.  Psych, normal mood and affect.  Musculoskeletal, joint examination is grossly normal.     Copied text material from yesterday's note has been reviewed for appropriate changes and remains accurate as of 12/3/23.         Results Review     I reviewed the patient's new clinical results.  Results from last 7 days   Lab Units 23  0405 23  0724 23  0643 23  0716   WBC 10*3/mm3 10.27 8.97 7.40 6.10   HEMOGLOBIN g/dL 7.5* 8.3* 8.5* 8.3*   PLATELETS 10*3/mm3 209 238 238 240     Results from last 7 days   Lab Units 23  1043 23  0405 23  1834 23  0724 23  0643 23  0716   SODIUM mmol/L  --  131*  --  129* 128* 127*   POTASSIUM mmol/L 5.5* 5.1 5.1 5.3* 5.5* 4.5   CHLORIDE mmol/L  --  98  --  96* 91* 94*   CO2 mmol/L  --  21.8*  --  23.2 24.4 25.3   BUN mg/dL  --  41*  --  27* 37* 26*   CREATININE mg/dL  --  4.93*  --  3.38* 5.40* 3.51*   GLUCOSE mg/dL  --  84  --  89 73 80   EGFR mL/min/1.73  --  11.4*  --  17.9* 10.2* 17.2*     Results from last 7 days   Lab Units 23  0405 23  0724 23  0643 23  0716   ALBUMIN g/dL 2.2*  2.3* 2.1* 2.0*     Results from last 7 days   Lab Units 12/03/23  1043 12/03/23  0405 12/02/23  0724 12/01/23  0643 11/30/23  0716 11/29/23  0546   CALCIUM mg/dL  --  8.7 8.8 8.9 8.7 8.9   ALBUMIN g/dL  --  2.2* 2.3* 2.1* 2.0* 2.1*   MAGNESIUM mg/dL 2.0 1.9  --   --  1.9 2.1   PHOSPHORUS mg/dL  --  4.8* 4.6* 4.9* 4.4 3.9       Glucose   Date/Time Value Ref Range Status   12/03/2023 1154 93 70 - 130 mg/dL Final   12/03/2023 0601 89 70 - 130 mg/dL Final   12/02/2023 2040 117 70 - 130 mg/dL Final   12/02/2023 1718 91 70 - 130 mg/dL Final   12/02/2023 1152 103 70 - 130 mg/dL Final   12/02/2023 0614 93 70 - 130 mg/dL Final   12/02/2023 0204 122 70 - 130 mg/dL Final       No radiology results for the last day    I have personally reviewed all medications:  Scheduled Medications  amLODIPine, 5 mg, Oral, Q24H  apixaban, 5 mg, Oral, Q12H  aspirin, 81 mg, Nasogastric, Daily  carvedilol, 25 mg, Nasogastric, Q12H  chlorhexidine, 15 mL, Mouth/Throat, Q12H  escitalopram, 10 mg, Nasogastric, Daily  First Mouthwash (Magic Mouthwash), 10 mL, Swish & Spit, Q6H  hydroxychloroquine, 200 mg, Oral, Daily  insulin regular, 2-7 Units, Subcutaneous, Q6H  Lacosamide, 100 mg, Intravenous, Q12H  lactulose, 10 g, Nasogastric, TID  lidocaine, 10 mL, Subcutaneous, Once  metoclopramide, 5 mg, Intravenous, Q6H  mupirocin, , Each Nare, BID  mycophenolate, 250 mg, Oral, Q12H  ondansetron, 4 mg, Oral, TID AC  pantoprazole, 40 mg, Intravenous, Q AM  sodium zirconium cyclosilicate, 10 g, Oral, Q6H    Infusions  sodium chloride, 75 mL/hr, Last Rate: 75 mL/hr (12/01/23 1035)    Diet  Diet: Regular/House Diet; Texture: Regular Texture (IDDSI 7); Fluid Consistency: Thin (IDDSI 0)    I have personally reviewed:  [x]  Laboratory   [x]  Microbiology   [x]  Radiology   [x]  EKG/Telemetry  [x]  Cardiology/Vascular   []  Pathology    []  Records       Assessment/Plan     Active Hospital Problems    Diagnosis  POA    **Dissecting ascending aortic aneurysm  [I71.010]  Yes    Recent cerebrovascular accident (CVA) [Z86.73]  Yes    Aortic valve lesion [I35.8]  Yes    Severe aortic valve regurgitation [I35.1]  Yes    Hyponatremia [E87.1]  Yes    Poor appetite [R63.0]  Yes    Moderate malnutrition [E44.0]  Yes    Chronic diastolic CHF (congestive heart failure) [I50.32]  Yes    Anemia due to chronic kidney disease, on chronic dialysis [N18.6, D63.1, Z99.2]  Not Applicable    Peritoneal dialysis catheter in place [Z99.2]  Not Applicable    Essential hypertension [I10]  Yes    End stage renal disease on dialysis [N18.6, Z99.2]  Not Applicable    Seizure disorder [G40.909]  Yes    Elevated liver function tests [R79.89]  Yes    Pericardial effusion [I31.39]  Yes    Systemic lupus erythematosus [M32.9]  Yes    Thrombocytopenia [D69.6]  Yes    Hypertension secondary to other renal disorders [I15.1]  Yes    Pancytopenia [D61.818]  Yes    Vitamin D deficiency [E55.9]  Yes      Resolved Hospital Problems    Diagnosis Date Resolved POA    Abdominal pain [R10.9] 10/28/2023 Yes       33 y.o. female admitted with Dissecting ascending aortic aneurysm.    Assessment and plan  1.  End-stage renal disease, dependent on peritoneal dialysis, continue hemodialysis per nephrology, vascular surgery on board.      2.  Acute CVA, seizures, status post neurology evaluation, continue current medications.     3.  Severe protein calorie malnutrition, continue nutrition.     4.  History of SLE, chronic condition.     5.  Hyponatremia, monitor sodium trend.    6. Chest pain, cardiology on board.     7. Anemia, monitor hemoglobin trend, transfuse PRBCs.  if hemoglobin drops less than 7.     8.  CODE STATUS is full code.  Further plans based on hospital course.               Torres Gay MD  [facility]  12/03/23  16:41 EST

## 2023-12-03 NOTE — PROGRESS NOTES
HOSPITAL PROGRESS NOTE    Date of Service: 23  LOS:  LOS: 38 days   Patient Name: Dee Herrera  Age/Sex: 33 y.o. female  : 1990  MRN: [id number]  Primary Cardiologist: Dr. Juana Taylor    Subjective:     Chief Complaint/Follow up:   Aortic aneurysm, aortic regurgitation, status postrepair    Interval History:   RN paged the cardiology service about an hour ago.  Patient reported chest pain and this resolved with 1 sublingual nitroglycerin.  EKG obtained and looked unchanged.  Troponin level elevated, but trending down.  Blood pressure elevated.    I just checked on patient.  She does not remember the episode of chest pain an hour ago.  She questioned if she went for a procedure today?  She denies chest pain, shortness of breath, abdominal pain, palpitations, or dizziness at this time.      Objective:     Objective:  Temp:  [98.1 °F (36.7 °C)-98.4 °F (36.9 °C)] 98.4 °F (36.9 °C)  Heart Rate:  [73-75] 75  Resp:  [16] 16  BP: (119-157)/() 157/111  Body mass index is 17.67 kg/m².    Intake/Output Summary (Last 24 hours) at 12/3/2023 1127  Last data filed at 12/3/2023 0900  Gross per 24 hour   Intake 178 ml   Output --   Net 178 ml         23  0600 23  0500 23  0615   Weight: 44.3 kg (97 lb 10.6 oz) 44 kg (97 lb) 48.1 kg (106 lb 0.7 oz)       Physical Exam:   General Appearance: Alert.  In no acute distress.  Neck: No JVD.  Lungs: Clear. Normal respiratory effort and rate.  Heart: Regular rate and rhythm, normal S1 and S2, no murmurs, gallops or rubs.  Extremities: No edema.   Abdomen.  She barely and dressing clean dry and intact.    Lab Review:   Results from last 7 days   Lab Units 23  1043 23  0405 23  1834 23  0724   SODIUM mmol/L  --  131*  --  129*   POTASSIUM mmol/L 5.5* 5.1   < > 5.3*   CHLORIDE mmol/L  --  98  --  96*   CO2 mmol/L  --  21.8*  --  23.2   BUN mg/dL  --  41*  --  27*   CREATININE mg/dL  --  4.93*  --  3.38*   GLUCOSE  mg/dL  --  84  --  89   CALCIUM mg/dL  --  8.7  --  8.8    < > = values in this interval not displayed.     Results from last 7 days   Lab Units 12/03/23  1043   HSTROP T ng/L 194*     Results from last 7 days   Lab Units 12/03/23  0405 12/02/23  0724   WBC 10*3/mm3 10.27 8.97   HEMOGLOBIN g/dL 7.5* 8.3*   HEMATOCRIT % 23.2* 26.4*   PLATELETS 10*3/mm3 209 238         Results from last 7 days   Lab Units 12/03/23  1043 12/03/23  0405   MAGNESIUM mg/dL 2.0 1.9                   Results for orders placed during the hospital encounter of 10/26/23    Adult Transthoracic Echo Limited W/ Cont if Necessary Per Protocol    Interpretation Summary    Limited echo to look for pericardial effusion    Left ventricular systolic function is hyperdynamic (EF > 70%). Left ventricular ejection fraction appears to be greater than 70%.    Left ventricular wall thickness is consistent with severe concentric hypertrophy.    There is a small (<1cm) pericardial effusion with right ventricle diastolic collapse present. There is evidence of cardiac tamponade.    There appears to be collapse of the right ventricle however no free-flowing fluid.    Spoke with Dr Taylor    I reviewed the patient's new clinical results.  I personally viewed and interpreted the patient's EKG/Telemetry data/Labs/Test Results.     Current Medications:   Scheduled Meds:  apixaban, 5 mg, Oral, Q12H  aspirin, 81 mg, Nasogastric, Daily  carvedilol, 25 mg, Nasogastric, Q12H  chlorhexidine, 15 mL, Mouth/Throat, Q12H  escitalopram, 10 mg, Nasogastric, Daily  First Mouthwash (Magic Mouthwash), 10 mL, Swish & Spit, Q6H  hydroxychloroquine, 200 mg, Oral, Daily  insulin regular, 2-7 Units, Subcutaneous, Q6H  Lacosamide, 100 mg, Intravenous, Q12H  lactulose, 10 g, Nasogastric, TID  lidocaine, 10 mL, Subcutaneous, Once  metoclopramide, 5 mg, Intravenous, Q6H  mupirocin, , Each Nare, BID  mycophenolate, 250 mg, Oral, Q12H  ondansetron, 4 mg, Oral, TID AC  pantoprazole, 40 mg,  Intravenous, Q AM      Allergies:  Allergies   Allergen Reactions    Minoxidil Hives and Other (See Comments)     Pericardial effusion .       Assessment:       Dissecting ascending aortic aneurysm    Vitamin D deficiency    Thrombocytopenia    Hypertension secondary to other renal disorders    Pancytopenia    Systemic lupus erythematosus    Pericardial effusion    End stage renal disease on dialysis    Seizure disorder    Elevated liver function tests    Essential hypertension    Peritoneal dialysis catheter in place    Anemia due to chronic kidney disease, on chronic dialysis    Hyponatremia    Poor appetite    Moderate malnutrition    Chronic diastolic CHF (congestive heart failure)    Aortic valve lesion    Severe aortic valve regurgitation    Recent cerebrovascular accident (CVA)        Plan:   Assessment & Plan       1.  Ascending aortic aneurysm with subacute/chronic aortic root dissection.  Status post aneurysm repair and replacement 11/2.  2.  Severe aortic regurgitation due to aneurysm.  Status postrepair.  3.  Cardiac tamponade secondary to pericardial thrombus anteriorly and compressing right ventricle.  Requiring reexploration with clot removal and treatment of small amount of bleeding at the suture line on 11/6.  4.  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  On hemodialysis.  PD catheter removed 12/1.  Hyponatremic.  5.  Hypertension.  Elevated today.    6.  Chronic anemia.  Hemoglobin worse today at 7.5.  7.  Systemic lupus erythematosus.  8.  Right MCA stroke confirmed by MRI.  9.  Right IJ DVT subacute.  Felt to be provoked by dialysis catheter.  Vascular surgery recommended 6 months of anticoagulation with apixaban.    10.  Seizures started postoperatively.  11.  Depression.  12.  Pain from peritoneal dialysis catheter resolved.  13.  Forgetfulness.  I notified the RN that she does not remember the episode of chest pain that occurred an hour ago.  She is going to alert medicine.  14.  Hyperkalemic.  15.   Chest pain: I do not feel that this represents ACS.  She does not remember having the episode of chest pain.  May be due to hypertension and her severe anemia.    -Start amlodipine 5 mg 1 tablet daily for better blood pressure control  -Check echocardiogram to look for any wall motion abnormalities and reassess pericardial effusion  -RN to notify medicine about episode of chest pain, anemia, and memory issues  -I discussed with Dr. Torres, reviewed the EKG and troponin level with her, and she agrees with the plan of care.       CAESAR Thrasher  [facility]   12/03/23  11:27 EST

## 2023-12-04 ENCOUNTER — APPOINTMENT (OUTPATIENT)
Dept: CARDIOLOGY | Facility: HOSPITAL | Age: 33
DRG: 219 | End: 2023-12-04
Payer: MEDICARE

## 2023-12-04 PROBLEM — I82.C29: Status: ACTIVE | Noted: 2023-12-04

## 2023-12-04 LAB
ABO GROUP BLD: NORMAL
ALBUMIN SERPL-MCNC: 2.2 G/DL (ref 3.5–5.2)
ANION GAP SERPL CALCULATED.3IONS-SCNC: 13.8 MMOL/L (ref 5–15)
BASOPHILS # BLD AUTO: 0.01 10*3/MM3 (ref 0–0.2)
BASOPHILS NFR BLD AUTO: 0.1 % (ref 0–1.5)
BH CV ECHO MEAS - EDV(MOD-SP2): 93 ML
BH CV ECHO MEAS - EDV(MOD-SP4): 113 ML
BH CV ECHO MEAS - EF(MOD-BP): 65 %
BH CV ECHO MEAS - EF(MOD-SP2): 65.6 %
BH CV ECHO MEAS - EF(MOD-SP4): 64.6 %
BH CV ECHO MEAS - ESV(MOD-SP2): 32 ML
BH CV ECHO MEAS - ESV(MOD-SP4): 40 ML
BH CV ECHO MEAS - LAT PEAK E' VEL: 8.7 CM/SEC
BH CV ECHO MEAS - LV DIASTOLIC VOL/BSA (35-75): 74.2 CM2
BH CV ECHO MEAS - LV SYSTOLIC VOL/BSA (12-30): 26.3 CM2
BH CV ECHO MEAS - MED PEAK E' VEL: 4.5 CM/SEC
BH CV ECHO MEAS - MV A MAX VEL: 72 CM/SEC
BH CV ECHO MEAS - MV DEC SLOPE: 465.7 CM/SEC2
BH CV ECHO MEAS - MV DEC TIME: 0.17 SEC
BH CV ECHO MEAS - MV E MAX VEL: 77.4 CM/SEC
BH CV ECHO MEAS - MV E/A: 1.07
BH CV ECHO MEAS - SI(MOD-SP2): 40.1 ML/M2
BH CV ECHO MEAS - SI(MOD-SP4): 47.9 ML/M2
BH CV ECHO MEAS - SV(MOD-SP2): 61 ML
BH CV ECHO MEAS - SV(MOD-SP4): 73 ML
BH CV ECHO MEASUREMENTS AVERAGE E/E' RATIO: 11.73
BLD GP AB SCN SERPL QL: NEGATIVE
BUN SERPL-MCNC: 54 MG/DL (ref 6–20)
BUN/CREAT SERPL: 8.5 (ref 7–25)
CALCIUM SPEC-SCNC: 8.7 MG/DL (ref 8.6–10.5)
CHLORIDE SERPL-SCNC: 92 MMOL/L (ref 98–107)
CO2 SERPL-SCNC: 21.2 MMOL/L (ref 22–29)
CREAT SERPL-MCNC: 6.37 MG/DL (ref 0.57–1)
DEPRECATED RDW RBC AUTO: 43.8 FL (ref 37–54)
EGFRCR SERPLBLD CKD-EPI 2021: 8.4 ML/MIN/1.73
EOSINOPHIL # BLD AUTO: 0.04 10*3/MM3 (ref 0–0.4)
EOSINOPHIL NFR BLD AUTO: 0.4 % (ref 0.3–6.2)
ERYTHROCYTE [DISTWIDTH] IN BLOOD BY AUTOMATED COUNT: 14.3 % (ref 12.3–15.4)
GLUCOSE BLDC GLUCOMTR-MCNC: 86 MG/DL (ref 70–130)
GLUCOSE SERPL-MCNC: 81 MG/DL (ref 65–99)
HBV SURFACE AG SERPL QL IA: NORMAL
HCT VFR BLD AUTO: 20.5 % (ref 34–46.6)
HCT VFR BLD AUTO: 24.7 % (ref 34–46.6)
HGB BLD-MCNC: 6.5 G/DL (ref 12–15.9)
HGB BLD-MCNC: 8 G/DL (ref 12–15.9)
IMM GRANULOCYTES # BLD AUTO: 0.08 10*3/MM3 (ref 0–0.05)
IMM GRANULOCYTES NFR BLD AUTO: 0.8 % (ref 0–0.5)
LYMPHOCYTES # BLD AUTO: 0.55 10*3/MM3 (ref 0.7–3.1)
LYMPHOCYTES NFR BLD AUTO: 5.4 % (ref 19.6–45.3)
MCH RBC QN AUTO: 26.6 PG (ref 26.6–33)
MCHC RBC AUTO-ENTMCNC: 31.7 G/DL (ref 31.5–35.7)
MCV RBC AUTO: 84 FL (ref 79–97)
MONOCYTES # BLD AUTO: 0.65 10*3/MM3 (ref 0.1–0.9)
MONOCYTES NFR BLD AUTO: 6.3 % (ref 5–12)
NEUTROPHILS NFR BLD AUTO: 8.91 10*3/MM3 (ref 1.7–7)
NEUTROPHILS NFR BLD AUTO: 87 % (ref 42.7–76)
NRBC BLD AUTO-RTO: 0 /100 WBC (ref 0–0.2)
PHOSPHATE SERPL-MCNC: 5.3 MG/DL (ref 2.5–4.5)
PLATELET # BLD AUTO: 182 10*3/MM3 (ref 140–450)
PMV BLD AUTO: 10.8 FL (ref 6–12)
POTASSIUM SERPL-SCNC: 5 MMOL/L (ref 3.5–5.2)
RBC # BLD AUTO: 2.44 10*6/MM3 (ref 3.77–5.28)
RH BLD: POSITIVE
SODIUM SERPL-SCNC: 127 MMOL/L (ref 136–145)
T&S EXPIRATION DATE: NORMAL
WBC NRBC COR # BLD AUTO: 10.24 10*3/MM3 (ref 3.4–10.8)

## 2023-12-04 PROCEDURE — 93321 DOPPLER ECHO F-UP/LMTD STD: CPT | Performed by: INTERNAL MEDICINE

## 2023-12-04 PROCEDURE — 86923 COMPATIBILITY TEST ELECTRIC: CPT

## 2023-12-04 PROCEDURE — 25010000002 METOCLOPRAMIDE PER 10 MG: Performed by: SURGERY

## 2023-12-04 PROCEDURE — 94799 UNLISTED PULMONARY SVC/PX: CPT

## 2023-12-04 PROCEDURE — 80069 RENAL FUNCTION PANEL: CPT | Performed by: INTERNAL MEDICINE

## 2023-12-04 PROCEDURE — P9016 RBC LEUKOCYTES REDUCED: HCPCS

## 2023-12-04 PROCEDURE — 93321 DOPPLER ECHO F-UP/LMTD STD: CPT

## 2023-12-04 PROCEDURE — 86900 BLOOD TYPING SEROLOGIC ABO: CPT | Performed by: STUDENT IN AN ORGANIZED HEALTH CARE EDUCATION/TRAINING PROGRAM

## 2023-12-04 PROCEDURE — 85018 HEMOGLOBIN: CPT | Performed by: STUDENT IN AN ORGANIZED HEALTH CARE EDUCATION/TRAINING PROGRAM

## 2023-12-04 PROCEDURE — 85014 HEMATOCRIT: CPT | Performed by: STUDENT IN AN ORGANIZED HEALTH CARE EDUCATION/TRAINING PROGRAM

## 2023-12-04 PROCEDURE — 93308 TTE F-UP OR LMTD: CPT | Performed by: INTERNAL MEDICINE

## 2023-12-04 PROCEDURE — 63710000001 MYCOPHENOLATE MOFETIL PER 250 MG: Performed by: SURGERY

## 2023-12-04 PROCEDURE — 25010000002 LACOSAMIDE 200 MG/20ML SOLUTION: Performed by: SURGERY

## 2023-12-04 PROCEDURE — 85025 COMPLETE CBC W/AUTO DIFF WBC: CPT | Performed by: INTERNAL MEDICINE

## 2023-12-04 PROCEDURE — 99024 POSTOP FOLLOW-UP VISIT: CPT | Performed by: THORACIC SURGERY (CARDIOTHORACIC VASCULAR SURGERY)

## 2023-12-04 PROCEDURE — 63710000001 ONDANSETRON PER 8 MG: Performed by: SURGERY

## 2023-12-04 PROCEDURE — 86901 BLOOD TYPING SEROLOGIC RH(D): CPT | Performed by: STUDENT IN AN ORGANIZED HEALTH CARE EDUCATION/TRAINING PROGRAM

## 2023-12-04 PROCEDURE — 87340 HEPATITIS B SURFACE AG IA: CPT | Performed by: STUDENT IN AN ORGANIZED HEALTH CARE EDUCATION/TRAINING PROGRAM

## 2023-12-04 PROCEDURE — 93308 TTE F-UP OR LMTD: CPT

## 2023-12-04 PROCEDURE — 86850 RBC ANTIBODY SCREEN: CPT | Performed by: STUDENT IN AN ORGANIZED HEALTH CARE EDUCATION/TRAINING PROGRAM

## 2023-12-04 PROCEDURE — 25010000002 HEPARIN (PORCINE) PER 1000 UNITS: Performed by: SURGERY

## 2023-12-04 PROCEDURE — 99232 SBSQ HOSP IP/OBS MODERATE 35: CPT | Performed by: INTERNAL MEDICINE

## 2023-12-04 PROCEDURE — 86900 BLOOD TYPING SEROLOGIC ABO: CPT

## 2023-12-04 PROCEDURE — C9254 INJECTION, LACOSAMIDE: HCPCS | Performed by: SURGERY

## 2023-12-04 PROCEDURE — 82948 REAGENT STRIP/BLOOD GLUCOSE: CPT

## 2023-12-04 RX ADMIN — METOCLOPRAMIDE 5 MG: 5 INJECTION, SOLUTION INTRAMUSCULAR; INTRAVENOUS at 20:25

## 2023-12-04 RX ADMIN — LACTULOSE 10 G: 10 SOLUTION ORAL at 20:24

## 2023-12-04 RX ADMIN — PANTOPRAZOLE SODIUM 40 MG: 40 INJECTION, POWDER, FOR SOLUTION INTRAVENOUS at 06:42

## 2023-12-04 RX ADMIN — DIPHENHYDRAMINE HYDROCHLORIDE AND LIDOCAINE HYDROCHLORIDE AND ALUMINUM HYDROXIDE AND MAGNESIUM HYDRO 10 ML: KIT at 20:57

## 2023-12-04 RX ADMIN — ONDANSETRON HYDROCHLORIDE 4 MG: 4 TABLET, FILM COATED ORAL at 06:43

## 2023-12-04 RX ADMIN — METOCLOPRAMIDE 5 MG: 5 INJECTION, SOLUTION INTRAMUSCULAR; INTRAVENOUS at 06:42

## 2023-12-04 RX ADMIN — CARVEDILOL 25 MG: 25 TABLET, FILM COATED ORAL at 20:26

## 2023-12-04 RX ADMIN — CARVEDILOL 25 MG: 25 TABLET, FILM COATED ORAL at 08:56

## 2023-12-04 RX ADMIN — ASPIRIN 81 MG: 81 TABLET, CHEWABLE ORAL at 08:57

## 2023-12-04 RX ADMIN — ACETAMINOPHEN 650 MG: 160 SOLUTION ORAL at 00:22

## 2023-12-04 RX ADMIN — METOCLOPRAMIDE 5 MG: 5 INJECTION, SOLUTION INTRAMUSCULAR; INTRAVENOUS at 12:07

## 2023-12-04 RX ADMIN — APIXABAN 5 MG: 5 TABLET, FILM COATED ORAL at 08:56

## 2023-12-04 RX ADMIN — ONDANSETRON HYDROCHLORIDE 4 MG: 4 TABLET, FILM COATED ORAL at 12:07

## 2023-12-04 RX ADMIN — ESCITALOPRAM OXALATE 10 MG: 10 TABLET, FILM COATED ORAL at 09:02

## 2023-12-04 RX ADMIN — HEPARIN SODIUM 3000 UNITS: 1000 INJECTION INTRAVENOUS; SUBCUTANEOUS at 16:06

## 2023-12-04 RX ADMIN — 0.12% CHLORHEXIDINE GLUCONATE 15 ML: 1.2 RINSE ORAL at 20:24

## 2023-12-04 RX ADMIN — LACOSAMIDE 100 MG: 10 INJECTION INTRAVENOUS at 21:49

## 2023-12-04 RX ADMIN — MYCOPHENOLATE MOFETIL 250 MG: 500 TABLET, FILM COATED ORAL at 08:56

## 2023-12-04 RX ADMIN — IPRATROPIUM BROMIDE AND ALBUTEROL SULFATE 3 ML: 2.5; .5 SOLUTION RESPIRATORY (INHALATION) at 22:29

## 2023-12-04 RX ADMIN — HYDROXYCHLOROQUINE SULFATE 200 MG: 200 TABLET ORAL at 08:57

## 2023-12-04 RX ADMIN — LACOSAMIDE 100 MG: 10 INJECTION INTRAVENOUS at 09:00

## 2023-12-04 RX ADMIN — MYCOPHENOLATE MOFETIL 250 MG: 500 TABLET, FILM COATED ORAL at 20:26

## 2023-12-04 RX ADMIN — METOCLOPRAMIDE 5 MG: 5 INJECTION, SOLUTION INTRAMUSCULAR; INTRAVENOUS at 00:26

## 2023-12-04 RX ADMIN — AMLODIPINE BESYLATE 5 MG: 5 TABLET ORAL at 08:57

## 2023-12-04 NOTE — PROGRESS NOTES
" LOS: 39 days   Patient Care Team:  Margarita Woods APRN as PCP - General (Nurse Practitioner)  Winnie Sanchez MD as Referring Physician (Obstetrics and Gynecology)  Norberto Almaraz MD PhD as Consulting Physician (Hematology and Oncology)  Lupis Thomas MD as Consulting Physician (Nephrology)  Hair Mckeon MD as Consulting Physician (Nephrology)  Juana Taylor MD as Consulting Physician (Cardiology)    Chief Complaint: Post-op    Subjective:  Symptoms:  Stable.  No shortness of breath, cough or chest pain.    Diet:  Poor intake (Improving now that she is allowed a regular diet. Refused PEG tube.).  No nausea or vomiting.    Activity level: Impaired due to weakness.    Pain:  She complains of pain that is mild.  She reports pain is unchanged.  Pain is well controlled.  (Intermittent abdominal cramping).       Vital Signs  Temp:  [99 °F (37.2 °C)-100.2 °F (37.9 °C)] 99.5 °F (37.5 °C)  Heart Rate:  [88-90] 90  Resp:  [18] 18  BP: (123-144)/() 123/96  Body mass index is 19.05 kg/m².    Intake/Output Summary (Last 24 hours) at 12/4/2023 1511  Last data filed at 12/3/2023 1814  Gross per 24 hour   Intake 222 ml   Output --   Net 222 ml     No intake/output data recorded.          12/03/23  0615 12/04/23  0500 12/04/23  0735   Weight: 48.1 kg (106 lb 0.7 oz) 50.6 kg (111 lb 8.8 oz) 50.3 kg (111 lb)     Objective:  General Appearance:  Comfortable and in no acute distress.    Vital signs: (most recent): Blood pressure 123/96, pulse 90, temperature 99.5 °F (37.5 °C), resp. rate 18, height 162.6 cm (64\"), weight 50.3 kg (111 lb), last menstrual period 08/10/2022, SpO2 100%, not currently breastfeeding.  Vital signs are normal.  No fever.    Output: No urine output and producing stool.    HEENT: (NG tube in place)    Lungs:  Normal effort and normal respiratory rate.  There are decreased breath sounds.    Heart: Normal rate.  Regular rhythm.  (SR on tele monitor)  Abdomen: Abdomen is soft and " "non-distended.  Bowel sounds are normal.     Pulses: Distal pulses are intact.    Neurological: Patient is alert and oriented to person, place and time.    Skin:  Warm and dry.  (Sternal wound healing well without erythema or drainage)          Results Review:        WBC WBC   Date Value Ref Range Status   12/04/2023 10.24 3.40 - 10.80 10*3/mm3 Final   12/03/2023 10.27 3.40 - 10.80 10*3/mm3 Final   12/02/2023 8.97 3.40 - 10.80 10*3/mm3 Final      HGB Hemoglobin   Date Value Ref Range Status   12/04/2023 6.5 (C) 12.0 - 15.9 g/dL Final   12/03/2023 7.5 (L) 12.0 - 15.9 g/dL Final   12/02/2023 8.3 (L) 12.0 - 15.9 g/dL Final      HCT Hematocrit   Date Value Ref Range Status   12/04/2023 20.5 (C) 34.0 - 46.6 % Final   12/03/2023 23.2 (L) 34.0 - 46.6 % Final   12/02/2023 26.4 (L) 34.0 - 46.6 % Final      Platelets Platelets   Date Value Ref Range Status   12/04/2023 182 140 - 450 10*3/mm3 Final   12/03/2023 209 140 - 450 10*3/mm3 Final   12/02/2023 238 140 - 450 10*3/mm3 Final        PT/INR:  No results found for: \"PROTIME\"/No results found for: \"INR\"    Sodium Sodium   Date Value Ref Range Status   12/04/2023 127 (L) 136 - 145 mmol/L Final   12/03/2023 131 (L) 136 - 145 mmol/L Final   12/02/2023 129 (L) 136 - 145 mmol/L Final      Potassium Potassium   Date Value Ref Range Status   12/04/2023 5.0 3.5 - 5.2 mmol/L Final   12/03/2023 5.5 (H) 3.5 - 5.2 mmol/L Final   12/03/2023 5.1 3.5 - 5.2 mmol/L Final   12/02/2023 5.1 3.5 - 5.2 mmol/L Final   12/02/2023 5.3 (H) 3.5 - 5.2 mmol/L Final      Chloride Chloride   Date Value Ref Range Status   12/04/2023 92 (L) 98 - 107 mmol/L Final   12/03/2023 98 98 - 107 mmol/L Final   12/02/2023 96 (L) 98 - 107 mmol/L Final      Bicarbonate CO2   Date Value Ref Range Status   12/04/2023 21.2 (L) 22.0 - 29.0 mmol/L Final   12/03/2023 21.8 (L) 22.0 - 29.0 mmol/L Final   12/02/2023 23.2 22.0 - 29.0 mmol/L Final      BUN BUN   Date Value Ref Range Status   12/04/2023 54 (H) 6 - 20 mg/dL Final "   12/03/2023 41 (H) 6 - 20 mg/dL Final   12/02/2023 27 (H) 6 - 20 mg/dL Final      Creatinine Creatinine   Date Value Ref Range Status   12/04/2023 6.37 (H) 0.57 - 1.00 mg/dL Final   12/03/2023 4.93 (H) 0.57 - 1.00 mg/dL Final   12/02/2023 3.38 (H) 0.57 - 1.00 mg/dL Final      Calcium Calcium   Date Value Ref Range Status   12/04/2023 8.7 8.6 - 10.5 mg/dL Final   12/03/2023 8.7 8.6 - 10.5 mg/dL Final   12/02/2023 8.8 8.6 - 10.5 mg/dL Final      Magnesium Magnesium   Date Value Ref Range Status   12/03/2023 2.0 1.6 - 2.6 mg/dL Final   12/03/2023 1.9 1.6 - 2.6 mg/dL Final            amLODIPine, 5 mg, Oral, Q24H  aspirin, 81 mg, Nasogastric, Daily  carvedilol, 25 mg, Nasogastric, Q12H  chlorhexidine, 15 mL, Mouth/Throat, Q12H  escitalopram, 10 mg, Nasogastric, Daily  First Mouthwash (Magic Mouthwash), 10 mL, Swish & Spit, Q6H  hydroxychloroquine, 200 mg, Oral, Daily  Lacosamide, 100 mg, Intravenous, Q12H  lactulose, 10 g, Nasogastric, TID  lidocaine, 10 mL, Subcutaneous, Once  metoclopramide, 5 mg, Intravenous, Q6H  mupirocin, , Each Nare, BID  mycophenolate, 250 mg, Oral, Q12H  ondansetron, 4 mg, Oral, TID AC  pantoprazole, 40 mg, Intravenous, Q AM           Assessment & Plan  - Ascending aortic aneurysm with dissection- s/p ascending aortic dissection repair/proximal replacement, gacron interposition graft, kelli procedure; insertion of right femoral shiley---POD#29; Dr. Medina  - Cardiac tamponade- reexploration for bleeding, removal of large clot compressing the RV---POD#24; Dr. Lowe  - severe aortic valve insufficiency  - Encephalopathy, improving   - ESRD on PD  - Lupus--Cellcept/plaquenil restarted 11/23 and patient seems to be improving  - Epilepsy  - chronic immunosuppression  - hypertension  - chronic anemia--stable  - TCP--chronic; improving  - Depression--started on lexapro 11/14  - right MCA CVA--continue ASA per neuro  - hyponatremia--hypervolemic, improving--nephrology following  - malnutrition  r/t decreased caloric intake--nutrition following  - hypokalemia--resolved; potassium today 5.0  - S/p TDC placement, 11/20      Reports feeling better. Ambulating and oral intake improving. No chest pain, SOB, palpitations, abdominal pain, syncope.    Patient apparently had a single episode of chest pain over the weekend that she does not remember having. Nitro improved the pain. EKG at the time was unchanged from prior. Troponin at the time was elevated but down significantly from prior. Echo was repeated and EF improved to 65%; otherwise unchanged from prior. Cardiology started amlodipine--BP responding well. Patient has had no further complaints since then.  PD catheter taken out yesterday (12/3); received hemodialysis Sat and is receiving it again today. Continues with hyponatremia, sodium now 127. Potassium up to 5.5 over the weekend, down to 5.0 today. Nephrology is following closely.  Has had dip in hgb--now 6.5, was 7.5 yesterday. Medicine transfusing PRBCs.  Patient seems to be improving from a surgical standpoint. Will continue with current plan to manage medically and encourage oral intake/mobilization.      JACKIE Poretr  12/04/23  15:11 EST

## 2023-12-04 NOTE — PLAN OF CARE
Problem: Adult Inpatient Plan of Care  Goal: Plan of Care Review  Outcome: Ongoing, Progressing  Goal: Patient-Specific Goal (Individualized)  Outcome: Ongoing, Progressing  Goal: Absence of Hospital-Acquired Illness or Injury  Outcome: Ongoing, Progressing  Goal: Optimal Comfort and Wellbeing  Outcome: Ongoing, Progressing  Goal: Readiness for Transition of Care  Outcome: Ongoing, Progressing     Problem: Skin Injury Risk Increased  Goal: Skin Health and Integrity  Outcome: Ongoing, Progressing     Problem: Malnutrition  Goal: Improved Nutritional Intake  Outcome: Ongoing, Progressing     Problem: Fall Injury Risk  Goal: Absence of Fall and Fall-Related Injury  Outcome: Ongoing, Progressing     Problem: Electrolyte Imbalance  Goal: Electrolyte Balance  Outcome: Ongoing, Progressing     Problem: Activity Intolerance (Cardiovascular Surgery)  Goal: Improved Activity Tolerance  Outcome: Ongoing, Progressing     Problem: Adjustment to Surgery (Cardiovascular Surgery)  Goal: Optimal Coping with Heart Surgery  Outcome: Ongoing, Progressing     Problem: Bleeding (Cardiovascular Surgery)  Goal: Bleeding (Cardiovascular Surgery)  Outcome: Ongoing, Progressing     Problem: Bowel Motility Impaired (Cardiovascular Surgery)  Goal: Effective Bowel Elimination (Cardiovascular Surgery)  Outcome: Ongoing, Progressing     Problem: Cardiac Function Impaired (Cardiovascular Surgery)  Goal: Effective Cardiac Function  Outcome: Ongoing, Progressing     Problem: Cerebral Tissue Perfusion (Cardiovascular Surgery)  Goal: Optimal Cerebral Tissue Perfusion (Cardiovascular Surgery)  Outcome: Ongoing, Progressing     Problem: Fluid and Electrolyte Imbalance (Cardiovascular Surgery)  Goal: Fluid and Electrolyte Balance (Cardiovascular Surgery)  Outcome: Ongoing, Progressing     Problem: Glycemic Control Impaired (Cardiovascular Surgery)  Goal: Blood Glucose Level Within Targeted Range (Cardiovascular Surgery)  Outcome: Ongoing, Progressing      Problem: Infection (Cardiovascular Surgery)  Goal: Absence of Infection Signs and Symptoms  Outcome: Ongoing, Progressing     Problem: Ongoing Anesthesia Effects (Cardiovascular Surgery)  Goal: Anesthesia/Sedation Recovery  Outcome: Ongoing, Progressing     Problem: Pain (Cardiovascular Surgery)  Goal: Acceptable Pain Control  Outcome: Ongoing, Progressing     Problem: Postoperative Nausea and Vomiting (Cardiovascular Surgery)  Goal: Nausea and Vomiting Relief (Cardiovascular Surgery)  Outcome: Ongoing, Progressing     Problem: Postoperative Urinary Retention (Cardiovascular Surgery)  Goal: Effective Urinary Elimination (Cardiovascular Surgery)  Outcome: Ongoing, Progressing     Problem: Respiratory Compromise (Cardiovascular Surgery)  Goal: Effective Oxygenation and Ventilation (Cardiovascular Surgery)  Outcome: Ongoing, Progressing     Problem: Seizure Disorder Comorbidity  Goal: Maintenance of Seizure Control  Outcome: Ongoing, Progressing     Problem: Restraint, Nonviolent  Goal: Absence of Harm or Injury  Outcome: Ongoing, Progressing   Goal Outcome Evaluation:

## 2023-12-04 NOTE — PROGRESS NOTES
Name: Dee Herrera ADMIT: 10/26/2023   : 1990  PCP: Margarita Woods APRN    MRN: [id number] LOS: 39 days   AGE/SEX: 33 y.o. female  ROOM: [facility]     Subjective   Subjective   Patient does not have any specific complaints this morning.  States that she is feeling tired.  Denies any specific pain.  Hgb 6.5 this morning, so receiving PRBC.    Objective   Objective   Vital Signs  Temp:  [97.5 °F (36.4 °C)-100.2 °F (37.9 °C)] 97.5 °F (36.4 °C)  Heart Rate:  [] 89  Resp:  [16-18] 16  BP: (105-144)/() 105/72  SpO2:  [100 %] 100 %  on   ;   Device (Oxygen Therapy): room air  Body mass index is 19.05 kg/m².  Physical Exam  General: Alert, no acute distress.  Ill-appearing.  ENT: Left-sided Shiley catheter in place.  Neuro: Eyes open and moving in all directions, strength normal in all extremities, no focal deficits.   Lungs: Clear to auscultation bilaterally. No wheeze or crackles. No distress.   Heart: RRR, no murmurs. No edema.  Abdomen: Soft, non-tender, non-distended. Normal bowel sounds.   Ext: Warm and well-perfused. No edema.   Skin: Sternotomy scar healing well.  No surrounding erythema or drainage.    Results Review     I reviewed the patient's new clinical results.  Results from last 7 days   Lab Units 23  0515 23  0405 23  0724 23  0643   WBC 10*3/mm3 10.24 10.27 8.97 7.40   HEMOGLOBIN g/dL 6.5* 7.5* 8.3* 8.5*   PLATELETS 10*3/mm3 182 209 238 238     Results from last 7 days   Lab Units 23  0515 23  1043 23  0405 23  1834 23  0724 23  0643   SODIUM mmol/L 127*  --  131*  --  129* 128*   POTASSIUM mmol/L 5.0 5.5* 5.1 5.1 5.3* 5.5*   CHLORIDE mmol/L 92*  --  98  --  96* 91*   CO2 mmol/L 21.2*  --  21.8*  --  23.2 24.4   BUN mg/dL 54*  --  41*  --  27* 37*   CREATININE mg/dL 6.37*  --  4.93*  --  3.38* 5.40*   GLUCOSE mg/dL 81  --  84  --  89 73   EGFR mL/min/1.73 8.4*  --  11.4*  --  17.9* 10.2*     Results from last 7 days    Lab Units 12/04/23  0515 12/03/23  0405 12/02/23  0724 12/01/23  0643   ALBUMIN g/dL 2.2* 2.2* 2.3* 2.1*     Results from last 7 days   Lab Units 12/04/23  0515 12/03/23  1043 12/03/23  0405 12/02/23  0724 12/01/23  0643 11/30/23  0716 11/29/23  0546   CALCIUM mg/dL 8.7  --  8.7 8.8 8.9 8.7 8.9   ALBUMIN g/dL 2.2*  --  2.2* 2.3* 2.1* 2.0* 2.1*   MAGNESIUM mg/dL  --  2.0 1.9  --   --  1.9 2.1   PHOSPHORUS mg/dL 5.3*  --  4.8* 4.6* 4.9* 4.4 3.9       Glucose   Date/Time Value Ref Range Status   12/04/2023 0615 86 70 - 130 mg/dL Final   12/03/2023 2045 116 70 - 130 mg/dL Final   12/03/2023 1804 134 (H) 70 - 130 mg/dL Final   12/03/2023 1154 93 70 - 130 mg/dL Final   12/03/2023 0601 89 70 - 130 mg/dL Final   12/02/2023 2040 117 70 - 130 mg/dL Final   12/02/2023 1718 91 70 - 130 mg/dL Final       No radiology results for the last day    I have personally reviewed all medications:  Scheduled Medications  amLODIPine, 5 mg, Oral, Q24H  aspirin, 81 mg, Nasogastric, Daily  carvedilol, 25 mg, Nasogastric, Q12H  chlorhexidine, 15 mL, Mouth/Throat, Q12H  escitalopram, 10 mg, Nasogastric, Daily  First Mouthwash (Magic Mouthwash), 10 mL, Swish & Spit, Q6H  hydroxychloroquine, 200 mg, Oral, Daily  Lacosamide, 100 mg, Intravenous, Q12H  lactulose, 10 g, Nasogastric, TID  lidocaine, 10 mL, Subcutaneous, Once  metoclopramide, 5 mg, Intravenous, Q6H  mupirocin, , Each Nare, BID  mycophenolate, 250 mg, Oral, Q12H  ondansetron, 4 mg, Oral, TID AC  pantoprazole, 40 mg, Intravenous, Q AM    Infusions     Diet  Diet: Regular/House Diet; Texture: Regular Texture (IDDSI 7); Fluid Consistency: Thin (IDDSI 0)    Assessment/Plan     Active Hospital Problems    Diagnosis  POA    **Dissecting ascending aortic aneurysm [I71.010]  Yes    Internal jugular (IJ) vein thromboembolism, chronic [I82.C29]  Unknown    Recent cerebrovascular accident (CVA) [Z86.73]  Yes    Aortic valve lesion [I35.8]  Yes    Severe aortic valve regurgitation [I35.1]  Yes     Hyponatremia [E87.1]  Yes    Moderate malnutrition [E44.0]  Yes    Chronic diastolic CHF (congestive heart failure) [I50.32]  Yes    Anemia due to chronic kidney disease, on chronic dialysis [N18.6, D63.1, Z99.2]  Not Applicable    Essential hypertension [I10]  Yes    End stage renal disease on dialysis [N18.6, Z99.2]  Not Applicable    Seizure disorder [G40.909]  Yes    Elevated liver function tests [R79.89]  Yes    Systemic lupus erythematosus [M32.9]  Yes    Thrombocytopenia [D69.6]  Yes      Resolved Hospital Problems    Diagnosis Date Resolved POA    Abdominal pain [R10.9] 10/28/2023 Yes       33 y.o. female admitted with Dissecting ascending aortic aneurysm.    Ascending aortic aneurysm with dissection  Cardiac tamponade  Severe aortic valve insufficiency  -11/2/2023 for dissection repair and replacement, 11/6/2023 for clot removal compressing RV with cardiac tamponade  -Most recent echocardiogram EF 65%, trivial pericardial effusion  -CT surgery continues to follow    Anemia  -Hgb decreased to 6.5 today  -No signs of active bleeding, but hemoglobin has been slowly trickling down  -Transfuse 1 unit PRBC today  -Recheck H&H after transfusion  -Monitor with daily CBC, transfuse for Hgb less than 7  -Check fecal occult today, hold Eliquis while pending    Right IJ DVT, chronic  -Found during placement of tunneled dialysis catheter  -On Eliquis, holding as above  -Hopefully can restart tomorrow if hemoglobin has improved and fecal occult is negative    HTN  -Continue Coreg 25 mg every 12 hours  -Amlodipine started over the weekend, BP better  -Continue to closely monitor, titrate medications as needed    ESRD  Hyponatremia, hyperkalemia  -Secondary to lupus nephritis, on peritoneal dialysis at home but patient has declined wanting to do PD anymore and has been agreeable to HD  -Sodium low at 127 today, potassium 5.0  -Nephrology consulted and following  -PD catheter now removed  -Continue HD via [place]  nephrology managing  -Fluid restriction to 1000 cc per 24 hours    Lupus  -Continue CellCept and Plaquenil    History of CVA  -Right MCA stroke on admission, confirmed by MRI  -Continue ASA per neurology    Depression  -Continue Lexapro  -Access and psych Been following    Seizure disorder  -Continue lacosamide    This patient was entirely new to me today and extensive chart review was performed.  Patient remains very sick, with overall poor prognosis.  Hgb is low at 6.5 today, so will transfuse 1 unit PRBC.  No signs of active bleeding, but going to check fecal occult blood.  Cardiology, CT surgery, and nephrology I will continue to follow very closely.  Ongoing goals of care discussions continue to be had.    Etta Rodriguez MD  [facility]  12/04/23  16:25 EST

## 2023-12-04 NOTE — NURSING NOTE
HD WITHOUT INCIDENT. TOLERATED WELL. REMOVED 2.4 L . NO MEDS ADMINISTERED. DRSG CHANGED, REMAINS OOZING AT SITE, IF CONTINUES WILL NEED TO HAVE VASCULAR EVALUATE. STABLE, NO C/O POST COMPLETION OF HD.

## 2023-12-04 NOTE — NURSING NOTE
Three separate units of PRBC have been ordered. Per attending, only one needs to be transfused for now, then recheck later.

## 2023-12-04 NOTE — SIGNIFICANT NOTE
12/04/23 1021   OTHER   Discipline physical therapist   Rehab Time/Intention   Session Not Performed other (see comments)  (hgb 6.5, receiving blood today and going for dialysis; f/u 12/5 if appropriate. Nurse aware.)   Recommendation   PT - Next Appointment 12/05/23

## 2023-12-04 NOTE — PROGRESS NOTES
[facility] Progress Note      Patient Name: Dee Herrera  : 1990  MRN: [id number]  Primary Care Physician:  Margarita Woods APRN  Date of admission: 10/26/2023    Subjective     Interval History:   Follow-up end-stage renal disease.    Patient is feeling much better, denies any chest pain or shortness of air, no orthopnea or PND, no nausea or vomiting    Review of Systems:   As noted above    Objective     Vitals:   Temp:  [98.2 °F (36.8 °C)-100.2 °F (37.9 °C)] 100.2 °F (37.9 °C)  Heart Rate:  [88] 88  Resp:  [16-18] 18  BP: (124-144)/() 144/101    Intake/Output Summary (Last 24 hours) at 2023 1008  Last data filed at 12/3/2023 1814  Gross per 24 hour   Intake 444 ml   Output --   Net 444 ml         Physical Exam:    General Appearance: alert, awake, chronically ill, no acute distress   Skin: warm and dry  HEENT: Core track is in place  Neck: supple, no JVD, tunneled dialysis cath in the left IJ with exit site below the right clavicle  Lungs: Bilateral rhonchi, breathing effort not labored  Heart: RRR, normal S1 and S2, no rub  Abdomen: soft, nontender, nondistended, normoactive bowels,  Extremities: no edema, cyanosis or clubbing  Neuro: Moving all extremities    Scheduled Meds:     amLODIPine, 5 mg, Oral, Q24H  apixaban, 5 mg, Oral, Q12H  aspirin, 81 mg, Nasogastric, Daily  carvedilol, 25 mg, Nasogastric, Q12H  chlorhexidine, 15 mL, Mouth/Throat, Q12H  escitalopram, 10 mg, Nasogastric, Daily  First Mouthwash (Magic Mouthwash), 10 mL, Swish & Spit, Q6H  hydroxychloroquine, 200 mg, Oral, Daily  insulin regular, 2-7 Units, Subcutaneous, Q6H  Lacosamide, 100 mg, Intravenous, Q12H  lactulose, 10 g, Nasogastric, TID  lidocaine, 10 mL, Subcutaneous, Once  metoclopramide, 5 mg, Intravenous, Q6H  mupirocin, , Each Nare, BID  mycophenolate, 250 mg, Oral, Q12H  ondansetron, 4 mg, Oral, TID AC  pantoprazole, 40 mg, Intravenous, Q AM      IV Meds:          Results  Reviewed:   I have personally reviewed the results from the time of this admission to 12/4/2023 10:08 EST     Results from last 7 days   Lab Units 12/04/23  0515 12/03/23  1043 12/03/23  0405 12/02/23  1834 12/02/23  0724   SODIUM mmol/L 127*  --  131*  --  129*   POTASSIUM mmol/L 5.0 5.5* 5.1   < > 5.3*   CHLORIDE mmol/L 92*  --  98  --  96*   CO2 mmol/L 21.2*  --  21.8*  --  23.2   BUN mg/dL 54*  --  41*  --  27*   CREATININE mg/dL 6.37*  --  4.93*  --  3.38*   CALCIUM mg/dL 8.7  --  8.7  --  8.8   GLUCOSE mg/dL 81  --  84  --  89    < > = values in this interval not displayed.       Estimated Creatinine Clearance: 10.2 mL/min (A) (by C-G formula based on SCr of 6.37 mg/dL (H)).    Results from last 7 days   Lab Units 12/04/23 0515 12/03/23  1043 12/03/23  0405 12/02/23  0724 12/01/23  0643 11/30/23  0716   MAGNESIUM mg/dL  --  2.0 1.9  --   --  1.9   PHOSPHORUS mg/dL 5.3*  --  4.8* 4.6*   < > 4.4    < > = values in this interval not displayed.             Results from last 7 days   Lab Units 12/04/23 0515 12/03/23  0405 12/02/23  0724 12/01/23  0643 11/30/23  0716   WBC 10*3/mm3 10.24 10.27 8.97 7.40 6.10   HEMOGLOBIN g/dL 6.5* 7.5* 8.3* 8.5* 8.3*   PLATELETS 10*3/mm3 182 209 238 238 240             Assessment / Plan     ASSESSMENT:  End-stage renal disease on peritoneal dialysis etiology of her ESRD is related to lupus nephritis.  Plan for hemodialysis today  hyponatremia, sodium today 127  Acute CVA, cortical infarct in the anterior inferior medial right frontal lobe and subacute subdural hematoma right occipital.  Ascending aortic aneurysm with subacute/chronic aortic root dissection, status postrepair of proximal aortic aneurysm 11/2/2023.  Reexploration for cardiac tamponade on 11/6/2023.  Seizure disorder appears to be stable currently on lacosamide  Anemia of chronic kidney disease hemoglobin today is down to 6.4  Hyperkalemia improved, potassium down to 5    PLAN:  Transfuse 1 units of packed RBCs with  dialysis  Continue fluid restriction 1000 cc per 24 hours  Hemodialysis today  Surveillance labs    I reviewed the chart and other providers notes, I reviewed labs.  I discussed the case with the patient and her nurse  Copied text in this note has been reviewed and is accurate as of 12/04/23.       Thank you for involving us in the care of Dee Herrera.  Please feel free to call with any questions.    Isaac Diaz MD  12/04/23  10:08 [facility]    [facility]  [phone]    Please note that portions of this note were completed with a voice recognition program.

## 2023-12-04 NOTE — NURSING NOTE
Dialysis RN reports changing pts dialysis cath dressing dt small amounts of oozing. Vasc surgery and attending notified via epic chat.    4122- page placed to vasc surgery

## 2023-12-04 NOTE — PROGRESS NOTES
"Patient Care Team:  Margarita Woods APRN as PCP - General (Nurse Practitioner)  Winnie Sanchez MD as Referring Physician (Obstetrics and Gynecology)  Norberto Almaraz MD PhD as Consulting Physician (Hematology and Oncology)  Lupis Thomas MD as Consulting Physician (Nephrology)  Hair Mckeon MD as Consulting Physician (Nephrology)  Juana Taylor MD as Consulting Physician (Cardiology)    Chief Complaint: Follow-up ascending aortic aneurysm and dissection    Interval History:   Feeling better.  Denies chest pain.    Objective   Vital Signs  Temp:  [99 °F (37.2 °C)-100.2 °F (37.9 °C)] 100.2 °F (37.9 °C)  Heart Rate:  [88] 88  Resp:  [18] 18  BP: (126-144)/() 144/101    Intake/Output Summary (Last 24 hours) at 12/4/2023 1422  Last data filed at 12/3/2023 1814  Gross per 24 hour   Intake 222 ml   Output --   Net 222 ml     Flowsheet Rows      Flowsheet Row First Filed Value   Admission Height 165.1 cm (65\") Documented at 10/26/2023 0012   Admission Weight 52.2 kg (115 lb) Documented at 10/26/2023 0012            Temp:  [99 °F (37.2 °C)-100.2 °F (37.9 °C)] 100.2 °F (37.9 °C)  Heart Rate:  [88] 88  Resp:  [18] 18  BP: (126-144)/() 144/101    Intake/Output Summary (Last 24 hours) at 12/4/2023 1422  Last data filed at 12/3/2023 1814  Gross per 24 hour   Intake 222 ml   Output --   Net 222 ml     Flowsheet Rows      Flowsheet Row First Filed Value   Admission Height 165.1 cm (65\") Documented at 10/26/2023 0012   Admission Weight 52.2 kg (115 lb) Documented at 10/26/2023 0012            General Appearance:    Alert, cooperative, in no acute distress   Head:    Normocephalic, without obvious abnormality, atraumatic       Neck/Lymph   No adenopathy, supple, no thyromegaly, no carotid bruit, no    JVD   Lungs:     Clear to auscultation bilaterally, no wheezes, rales, or     rhonchi    Cardiac:    Normal rate, regular rhythm, no murmur, no rub, no gallop   Chest Wall:    No abnormalities " observed   GI:     Normal bowel sounds, soft, nontender, nondistended,            no rebound tenderness   Extremities:   No cyanosis, clubbing, or edema   Circulatory/Peripheral Vascular :   Pulses palpable and equal bilaterally   Integumentary:   No bleeding or rash. Normal temperature                amLODIPine, 5 mg, Oral, Q24H  apixaban, 5 mg, Oral, Q12H  aspirin, 81 mg, Nasogastric, Daily  carvedilol, 25 mg, Nasogastric, Q12H  chlorhexidine, 15 mL, Mouth/Throat, Q12H  escitalopram, 10 mg, Nasogastric, Daily  First Mouthwash (Magic Mouthwash), 10 mL, Swish & Spit, Q6H  hydroxychloroquine, 200 mg, Oral, Daily  Lacosamide, 100 mg, Intravenous, Q12H  lactulose, 10 g, Nasogastric, TID  lidocaine, 10 mL, Subcutaneous, Once  metoclopramide, 5 mg, Intravenous, Q6H  mupirocin, , Each Nare, BID  mycophenolate, 250 mg, Oral, Q12H  ondansetron, 4 mg, Oral, TID AC  pantoprazole, 40 mg, Intravenous, Q AM             Results Review:    Results from last 7 days   Lab Units 12/04/23  0515   SODIUM mmol/L 127*   POTASSIUM mmol/L 5.0   CHLORIDE mmol/L 92*   CO2 mmol/L 21.2*   BUN mg/dL 54*   CREATININE mg/dL 6.37*   GLUCOSE mg/dL 81   CALCIUM mg/dL 8.7     Results from last 7 days   Lab Units 12/03/23  1325 12/03/23  1043   HSTROP T ng/L 186* 194*     Results from last 7 days   Lab Units 12/04/23  0515   WBC 10*3/mm3 10.24   HEMOGLOBIN g/dL 6.5*   HEMATOCRIT % 20.5*   PLATELETS 10*3/mm3 182             Results from last 7 days   Lab Units 12/03/23  1043   MAGNESIUM mg/dL 2.0         @LABRCNT(bnp)@  I reviewed the patient's new clinical results.  I personally viewed and interpreted the patient's EKG/Telemetry data            Assessment & Plan   Ascending aortic aneurysm with subacute/chronic aortic root dissection.  Status post aneurysm repair and replacement on 11/2.   Severe aortic regurgitation.  Due to #1.  Status post repair on 11/2.   Cardiac tamponade: secondary to pericardial thrombus anteriorly and compressing right  ventricle.  Requiring reexploration with clot removal and treatment of a small amount of bleeding at the suture line on 11/6.  Seizures.  Started postoperatively.   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  On peritoneal dialysis at home.  On hemodialysis postoperatively and then switch back to peritoneal dialysis after 11/13.   Hyponatremia.  Persistent   Hypertension.  Controlled on carvedilol.       Chronic anemia. stable   Systemic lupus erythematosus.  Hydroxychloroquine and mycophenolate resumed.    Right MCA CVA. confirmed by MRI. Neurology expects full recovery.  11. Right IJ DVT.  Subacute.      -Blood pressure slightly elevated prior to dialysis.  Will continue to follow after.  Consider increasing amlodipine if continues to be an issue.

## 2023-12-04 NOTE — NURSING NOTE
"Blood bank paged this RN this afternoon stating \"we already have two units ready for this pt and a third was just just placed?\" (Third order placed by dialysis RN). This RN verified with attending how many units this pt needed transfused and she stated \"one\". Two of these units out of three were dc'd by this RN to clarify orders. Dialysis RN stated now post transfusion that the unit scanned may have been one of the discontinued units? (Regardless, all prepared units were compatible with pt). She states she will follow up with blood bank now to clarify charting.   "

## 2023-12-04 NOTE — NURSING NOTE
Access follow up regarding depression: chart reviewed. Pt with flat affect and resting in bed. Pt continues on Lexapro. Pt voices to this RN that she does not have a problem with depression or anxiety. Pt denies any further needs or questions. Pt has voiced to access multiple times that she has no needs or concerns. Will d/c follow ups. Please reach out with further questions or concerns.

## 2023-12-05 LAB
ALBUMIN SERPL-MCNC: 2.1 G/DL (ref 3.5–5.2)
ALBUMIN/GLOB SERPL: 0.6 G/DL
ALP SERPL-CCNC: 92 U/L (ref 39–117)
ALT SERPL W P-5'-P-CCNC: 9 U/L (ref 1–33)
ANION GAP SERPL CALCULATED.3IONS-SCNC: 11.7 MMOL/L (ref 5–15)
AST SERPL-CCNC: 26 U/L (ref 1–32)
BH BB BLOOD EXPIRATION DATE: NORMAL
BH BB BLOOD TYPE BARCODE: 5100
BH BB DISPENSE STATUS: NORMAL
BH BB PRODUCT CODE: NORMAL
BH BB UNIT NUMBER: NORMAL
BILIRUB SERPL-MCNC: 0.5 MG/DL (ref 0–1.2)
BUN SERPL-MCNC: 20 MG/DL (ref 6–20)
BUN/CREAT SERPL: 5.8 (ref 7–25)
CALCIUM SPEC-SCNC: 8.4 MG/DL (ref 8.6–10.5)
CHLORIDE SERPL-SCNC: 95 MMOL/L (ref 98–107)
CO2 SERPL-SCNC: 22.3 MMOL/L (ref 22–29)
CREAT SERPL-MCNC: 3.45 MG/DL (ref 0.57–1)
CROSSMATCH INTERPRETATION: NORMAL
DEPRECATED RDW RBC AUTO: 44.5 FL (ref 37–54)
EGFRCR SERPLBLD CKD-EPI 2021: 17.5 ML/MIN/1.73
ERYTHROCYTE [DISTWIDTH] IN BLOOD BY AUTOMATED COUNT: 14.4 % (ref 12.3–15.4)
GLOBULIN UR ELPH-MCNC: 3.5 GM/DL
GLUCOSE BLDC GLUCOMTR-MCNC: 121 MG/DL (ref 70–130)
GLUCOSE BLDC GLUCOMTR-MCNC: 137 MG/DL (ref 70–130)
GLUCOSE SERPL-MCNC: 113 MG/DL (ref 65–99)
HCT VFR BLD AUTO: 24.7 % (ref 34–46.6)
HGB BLD-MCNC: 7.9 G/DL (ref 12–15.9)
MCH RBC QN AUTO: 27.3 PG (ref 26.6–33)
MCHC RBC AUTO-ENTMCNC: 32 G/DL (ref 31.5–35.7)
MCV RBC AUTO: 85.5 FL (ref 79–97)
PHOSPHATE SERPL-MCNC: 3.8 MG/DL (ref 2.5–4.5)
PLATELET # BLD AUTO: 164 10*3/MM3 (ref 140–450)
PMV BLD AUTO: 10.7 FL (ref 6–12)
POTASSIUM SERPL-SCNC: 4.3 MMOL/L (ref 3.5–5.2)
PROT SERPL-MCNC: 5.6 G/DL (ref 6–8.5)
RBC # BLD AUTO: 2.89 10*6/MM3 (ref 3.77–5.28)
SODIUM SERPL-SCNC: 129 MMOL/L (ref 136–145)
UNIT  ABO: NORMAL
UNIT  RH: NORMAL
WBC NRBC COR # BLD AUTO: 9.6 10*3/MM3 (ref 3.4–10.8)

## 2023-12-05 PROCEDURE — 63710000001 ONDANSETRON PER 8 MG: Performed by: SURGERY

## 2023-12-05 PROCEDURE — 97535 SELF CARE MNGMENT TRAINING: CPT

## 2023-12-05 PROCEDURE — C9254 INJECTION, LACOSAMIDE: HCPCS | Performed by: SURGERY

## 2023-12-05 PROCEDURE — 97530 THERAPEUTIC ACTIVITIES: CPT

## 2023-12-05 PROCEDURE — 25010000002 METOCLOPRAMIDE PER 10 MG: Performed by: SURGERY

## 2023-12-05 PROCEDURE — 25010000002 LACOSAMIDE 200 MG/20ML SOLUTION: Performed by: SURGERY

## 2023-12-05 PROCEDURE — 63710000001 MYCOPHENOLATE MOFETIL PER 250 MG: Performed by: SURGERY

## 2023-12-05 PROCEDURE — 85027 COMPLETE CBC AUTOMATED: CPT | Performed by: STUDENT IN AN ORGANIZED HEALTH CARE EDUCATION/TRAINING PROGRAM

## 2023-12-05 PROCEDURE — 80053 COMPREHEN METABOLIC PANEL: CPT | Performed by: STUDENT IN AN ORGANIZED HEALTH CARE EDUCATION/TRAINING PROGRAM

## 2023-12-05 PROCEDURE — 84100 ASSAY OF PHOSPHORUS: CPT | Performed by: INTERNAL MEDICINE

## 2023-12-05 PROCEDURE — 99232 SBSQ HOSP IP/OBS MODERATE 35: CPT | Performed by: NURSE PRACTITIONER

## 2023-12-05 PROCEDURE — 82948 REAGENT STRIP/BLOOD GLUCOSE: CPT

## 2023-12-05 RX ORDER — LIDOCAINE HYDROCHLORIDE AND EPINEPHRINE 10; 10 MG/ML; UG/ML
20 INJECTION, SOLUTION INFILTRATION; PERINEURAL ONCE
Status: COMPLETED | OUTPATIENT
Start: 2023-12-05 | End: 2023-12-05

## 2023-12-05 RX ORDER — AMLODIPINE BESYLATE 10 MG/1
10 TABLET ORAL
Status: DISCONTINUED | OUTPATIENT
Start: 2023-12-06 | End: 2023-12-05

## 2023-12-05 RX ORDER — AMLODIPINE BESYLATE 5 MG/1
5 TABLET ORAL
Status: DISCONTINUED | OUTPATIENT
Start: 2023-12-06 | End: 2023-12-06

## 2023-12-05 RX ORDER — PANTOPRAZOLE SODIUM 40 MG/1
40 TABLET, DELAYED RELEASE ORAL
Status: DISCONTINUED | OUTPATIENT
Start: 2023-12-06 | End: 2023-12-09 | Stop reason: HOSPADM

## 2023-12-05 RX ADMIN — PANTOPRAZOLE SODIUM 40 MG: 40 INJECTION, POWDER, FOR SOLUTION INTRAVENOUS at 06:09

## 2023-12-05 RX ADMIN — ONDANSETRON HYDROCHLORIDE 4 MG: 4 TABLET, FILM COATED ORAL at 18:02

## 2023-12-05 RX ADMIN — ASPIRIN 81 MG: 81 TABLET, CHEWABLE ORAL at 09:41

## 2023-12-05 RX ADMIN — HYDROXYCHLOROQUINE SULFATE 200 MG: 200 TABLET ORAL at 09:41

## 2023-12-05 RX ADMIN — CARVEDILOL 25 MG: 25 TABLET, FILM COATED ORAL at 09:41

## 2023-12-05 RX ADMIN — ONDANSETRON HYDROCHLORIDE 4 MG: 4 TABLET, FILM COATED ORAL at 09:41

## 2023-12-05 RX ADMIN — METOCLOPRAMIDE 5 MG: 5 INJECTION, SOLUTION INTRAMUSCULAR; INTRAVENOUS at 12:39

## 2023-12-05 RX ADMIN — BISACODYL 10 MG: 5 TABLET, COATED ORAL at 03:14

## 2023-12-05 RX ADMIN — AMLODIPINE BESYLATE 5 MG: 5 TABLET ORAL at 09:41

## 2023-12-05 RX ADMIN — MYCOPHENOLATE MOFETIL 250 MG: 500 TABLET, FILM COATED ORAL at 12:39

## 2023-12-05 RX ADMIN — HYDROCODONE BITARTRATE AND ACETAMINOPHEN 1 TABLET: 7.5; 325 TABLET ORAL at 21:23

## 2023-12-05 RX ADMIN — LACOSAMIDE 100 MG: 10 INJECTION INTRAVENOUS at 21:23

## 2023-12-05 RX ADMIN — METOCLOPRAMIDE 5 MG: 5 INJECTION, SOLUTION INTRAMUSCULAR; INTRAVENOUS at 06:09

## 2023-12-05 RX ADMIN — DIPHENHYDRAMINE HYDROCHLORIDE AND LIDOCAINE HYDROCHLORIDE AND ALUMINUM HYDROXIDE AND MAGNESIUM HYDRO 10 ML: KIT at 09:41

## 2023-12-05 RX ADMIN — 0.12% CHLORHEXIDINE GLUCONATE 15 ML: 1.2 RINSE ORAL at 09:41

## 2023-12-05 RX ADMIN — LACOSAMIDE 100 MG: 10 INJECTION INTRAVENOUS at 09:42

## 2023-12-05 RX ADMIN — LIDOCAINE HYDROCHLORIDE,EPINEPHRINE BITARTRATE 20 ML: 10; .01 INJECTION, SOLUTION INFILTRATION; PERINEURAL at 16:49

## 2023-12-05 RX ADMIN — ONDANSETRON HYDROCHLORIDE 4 MG: 4 TABLET, FILM COATED ORAL at 12:39

## 2023-12-05 RX ADMIN — LACTULOSE 10 G: 10 SOLUTION ORAL at 09:41

## 2023-12-05 RX ADMIN — 0.12% CHLORHEXIDINE GLUCONATE 15 ML: 1.2 RINSE ORAL at 21:24

## 2023-12-05 RX ADMIN — ESCITALOPRAM OXALATE 10 MG: 10 TABLET, FILM COATED ORAL at 09:41

## 2023-12-05 RX ADMIN — CARVEDILOL 25 MG: 25 TABLET, FILM COATED ORAL at 21:23

## 2023-12-05 NOTE — PLAN OF CARE
Goal Outcome Evaluation:  Plan of Care Reviewed With: patient           Outcome Evaluation: Pt seen for OT session today. Seated EOB with PT upon arrival. She required mod A with toileting needs. She was able to stand and perform sheila care today and required assist for posterior hygiene. She demo improvement in functional mobility distance today. Sat in chair and perform AROM of BUE to improve overall endurance and strength. Pt to continue to benefit from skilled OT to address deficits.      Anticipated Discharge Disposition (OT): skilled nursing facility

## 2023-12-05 NOTE — PROGRESS NOTES
HOSPITAL FOLLOW UP NOTE    Patient Name: Dee Herrera  Patient : 1990        Date of Service:23  Provider of Service: CAESAR Thomas  Place of Service: [facility]  Referral Provider: No ref. provider found          Follow Up: Ascending aortic aneurysm and dissection    Interval Hx: Diastolic BP high, -2.4L taken off via HD and one unit PRBCs transfused yesterday, resting comfortably,  slept well last night, no pain        OBJECTIVE  Temp:  [97.5 °F (36.4 °C)-99.9 °F (37.7 °C)] 99.9 °F (37.7 °C)  Heart Rate:  [] 91  Resp:  [16-18] 18  BP: (105-139)/() 135/103     Intake/Output Summary (Last 24 hours) at 2023 0822  Last data filed at 2023 0500  Gross per 24 hour   Intake 240 ml   Output 2400 ml   Net -2160 ml     Body mass index is 18.62 kg/m².      23  0500 23  0735 23  0500   Weight: 50.6 kg (111 lb 8.8 oz) 50.3 kg (111 lb) 49.2 kg (108 lb 7.5 oz)         Physical Exam:     General Appearance:    Alert, cooperative, in no acute distress   Head:    Normocephalic, without obvious abnormality, atraumatic   Eyes:            Conjunctivae and sclerae normal, no   icterus, no pallor, corneas clear, PERRLA   Neck:   No adenopathy, supple, trachea midline, no thyromegaly, no   carotid bruit, no JVD   Lungs:     Clear to auscultation,respirations regular, even and unlabored    Heart:    Regular rhythm and normal rate, normal S1 and S2, no murmur, no gallop, no rub, no click   Chest Wall:    No abnormalities observed   Abdomen:     Normal bowel sounds, no masses, no organomegaly, soft, nontender, nondistended, no guarding, no rebound  tenderness   Extremities:   Moves all extremities well, no edema, no cyanosis, no redness   Pulses:   Pulses palpable and equal bilaterally.          CURRENT MEDS    Scheduled Meds:amLODIPine, 5 mg, Oral, Q24H  aspirin, 81 mg, Nasogastric, Daily  carvedilol, 25 mg, Nasogastric, Q12H  chlorhexidine, 15 mL,  Mouth/Throat, Q12H  escitalopram, 10 mg, Nasogastric, Daily  First Mouthwash (Magic Mouthwash), 10 mL, Swish & Spit, Q6H  hydroxychloroquine, 200 mg, Oral, Daily  Lacosamide, 100 mg, Intravenous, Q12H  lactulose, 10 g, Nasogastric, TID  lidocaine, 10 mL, Subcutaneous, Once  metoclopramide, 5 mg, Intravenous, Q6H  mupirocin, , Each Nare, BID  mycophenolate, 250 mg, Oral, Q12H  ondansetron, 4 mg, Oral, TID AC  pantoprazole, 40 mg, Intravenous, Q AM      Continuous Infusions:       Lab Review:   Results from last 7 days   Lab Units 12/05/23  0707 12/04/23  0515   SODIUM mmol/L 129* 127*   POTASSIUM mmol/L 4.3 5.0   CHLORIDE mmol/L 95* 92*   CO2 mmol/L 22.3 21.2*   BUN mg/dL 20 54*   CREATININE mg/dL 3.45* 6.37*   GLUCOSE mg/dL 113* 81   CALCIUM mg/dL 8.4* 8.7   AST (SGOT) U/L 26  --    ALT (SGPT) U/L 9  --          Results from last 7 days   Lab Units 12/05/23  0707 12/04/23 2005 12/04/23  0515   WBC 10*3/mm3 9.60  --  10.24   HEMOGLOBIN g/dL 7.9* 8.0* 6.5*   HEMATOCRIT % 24.7* 24.7* 20.5*   PLATELETS 10*3/mm3 164  --  182         Results from last 7 days   Lab Units 12/03/23  1043 12/03/23  0405   MAGNESIUM mg/dL 2.0 1.9                       ASSESSMENT & PLAN    Dissecting ascending aortic aneurysm    Thrombocytopenia    Systemic lupus erythematosus    End stage renal disease on dialysis    Seizure disorder    Elevated liver function tests    Essential hypertension    Anemia due to chronic kidney disease, on chronic dialysis    Hyponatremia    Moderate malnutrition    Chronic diastolic CHF (congestive heart failure)    Aortic valve lesion    Severe aortic valve regurgitation    Recent cerebrovascular accident (CVA)    Internal jugular (IJ) vein thromboembolism, chronic      1.  Ascending aortic aneurysm with subacute/chronic aortic root dissection: Status post aneurysm repair and replacement on 11/2/2023  2.  Severe aortic regurgitation: Secondary to #1.  Status post repair on 11/2/2023  3.  Cardiac tamponade:  Secondary to pericardial thrombus anteriorly and compressing right ventricle.  Requiring reexploration with clot removal and treatment of a small amount of bleeding at the suture line on 11/6/2023  4.  Seizures: Started postoperatively  5.  ESRD: Secondary to lupus nephritis.  On peritoneal dialysis at home.  On hemodialysis while in hospital.  2.4 L removed yesterday  6.  Hyponatremia: Persistent  7.  Hypertension: Diastolic running high.  On carvedilol 25 mg twice daily and amlodipine.   Add ACE or ARB if okay with nephrology.  8.  Chronic anemia  9.  Systemic lupus erythematosus: Hydroxychloroquine and mycophenolate resumed  10.  Right MCA CVA: Confirmed by MRI.  Neurology expects full recovery  11.  Right IJ DVT: Subacute    Diastolic readings remain high.  Add ACE or ARB if okay with nephrology.      Nilda Oseguera, APRN  12/05/23

## 2023-12-05 NOTE — PLAN OF CARE
"Goal Outcome Evaluation:  Plan of Care Reviewed With: (P) patient           Outcome Evaluation: (P) Pt agreeable to walking c PT today. Pt was SBA c bed mob. Pt performed 3 STS c Rwx, CGA. Needs VC for bed mob and STS for sequencing. On 1st STS, pt immediately had a BM followed by stomach pains. Pt sat EOB to rest until sx subsided. Pt says it \"feels like I've taken a laxative.\" Pt stood @ Rwx for prolonged period while getting brief changed. Pt rested EOB before ambulating 180' c Rwx, CGA. Pt's posture and endurance has improved. Pt is still ambulating slowly c short, shuffled steps. Pt finished session [facility] c OT.      Anticipated Discharge Disposition (PT): (P) skilled nursing facility  "

## 2023-12-05 NOTE — THERAPY TREATMENT NOTE
Patient Name: Dee Herrera  : 1990    MRN: [id number]                              Today's Date: 2023       Admit Date: 10/26/2023    Visit Dx:     ICD-10-CM ICD-9-CM   1. Shortness of breath  R06.02 786.05   2. ESRD (end stage renal disease) on dialysis  N18.6 585.6    Z99.2 V45.11   3. Hyponatremia  E87.1 276.1   4. Generalized abdominal pain  R10.84 789.07   5. Elevated lactic acid level  R79.89 276.2   6. Elevated troponin  R79.89 790.6   7. Severe aortic valve regurgitation  I35.1 424.1   8. Pericardial effusion  I31.39 423.9   9. S/P AVR  Z95.2 V43.3   10. Recent cerebrovascular accident (CVA)  Z86.73 V49.89   11. Peritoneal dialysis catheter in place  Z99.2 V45.11   12. End stage renal disease on dialysis  N18.6 585.6    Z99.2 V45.11     Patient Active Problem List   Diagnosis    Vitamin D deficiency    Iron deficiency anemia    Morning stiffness of joints    Iron deficiency anemia, unspecified iron deficiency anemia type    Thrombocytopenia    Acute renal failure (ARF)    Hypertension secondary to other renal disorders    Pancytopenia    Hypoalbuminemia    Volume overload    Ear drainage right    T.T.P. syndrome    Systemic lupus erythematosus    Lupus nephritis, ISN/RPS class IV    Hypokalemia    Hypocalcemia    COVID-19    Hospital discharge follow-up    Stage 5 chronic kidney disease    Cardiac cirrhosis    Pancreatitis    Duodenitis    Regional enteritis of small bowel    Pericardial effusion    End stage renal disease on dialysis    Hemodialysis status    Seizure disorder    Elevated liver function tests    C. difficile colitis    Anemia, chronic disease    Essential hypertension    Peritoneal dialysis catheter in place    Anemia due to chronic kidney disease, on chronic dialysis    Alternating constipation and diarrhea    Abnormal stress test    Hyponatremia    Poor appetite    Moderate malnutrition    Chronic diastolic CHF (congestive heart failure)    Aortic valve lesion    Severe  aortic valve regurgitation    Dissecting ascending aortic aneurysm    Recent cerebrovascular accident (CVA)    Internal jugular (IJ) vein thromboembolism, chronic     Past Medical History:   Diagnosis Date    Anasarca     PER CT SCAN    Dry skin     ESRD (end stage renal disease) on dialysis     TUES, THURS, SAT [address]    History of abdominal pain     History of anemia     History of transfusion     Hypertension     Iron deficiency anemia 09/27/2021    Lupus (systemic lupus erythematosus) 07/30/2022    Migraine     Other specified nutritional anemias     Pericardial effusion     Renal insufficiency     Seizures     STATES LAST WAS 1/2023    Shortness of breath     OCCASIONAL    Vitamin D deficiency 09/27/2021     Past Surgical History:   Procedure Laterality Date    ASCENDING AORTIC ANEURYSM REPAIR W/ MECHANICAL AORTIC VALVE REPLACEMENT N/A 11/2/2023    Procedure: CHETNA STERNOTOMY, AORTIC ROOT REPLACEMENT WITH VALVE SPARING MISHEL PROCEDURE, REPLACEMENT OF ASCENDING AORTA, RIGHT FEMORAL DIALYSIS CATHETER PLACEMENT AND PRP;  Surgeon: Chris Medina MD;  Location: [facility] CVOR;  Service: Cardiothoracic;  Laterality: N/A;    CARDIAC CATHETERIZATION N/A 06/14/2023    Procedure: Coronary angiography;  Surgeon: Juana Taylor MD;  Location: [facility] CATH INVASIVE LOCATION;  Service: Cardiovascular;  Laterality: N/A;    CARDIAC CATHETERIZATION N/A 06/14/2023    Procedure: Left heart cath;  Surgeon: Juana Taylor MD;  Location: [facility] CATH INVASIVE LOCATION;  Service: Cardiovascular;  Laterality: N/A;    CARDIAC CATHETERIZATION N/A 06/14/2023    Procedure: Right Heart Cath;  Surgeon: Juana Taylor MD;  Location: [facility] CATH INVASIVE LOCATION;  Service: Cardiovascular;  Laterality: N/A;    COLONOSCOPY N/A 7/20/2023    Procedure: COLONOSCOPY to cecum with biopsy;  Surgeon: Drew Kaminski MD;  Location: [facility] ENDOSCOPY;  Service: Gastroenterology;  Laterality: N/A;  PRE - diarrhea, constipation  POST - fair  prep, normal    CORONARY ARTERY BYPASS GRAFT N/A 11/6/2023    Procedure: STERNAL EXPLORATION AND WASH OUT;  Surgeon: Jr Mietsh Quiroz MD;  Location: [facility] CVOR;  Service: Cardiothoracic;  Laterality: N/A;    ENDOSCOPY N/A 7/20/2023    Procedure: ESOPHAGOGASTRODUODENOSCOPY with biopsy;  Surgeon: Drew Kaminski MD;  Location: [facility] ENDOSCOPY;  Service: Gastroenterology;  Laterality: N/A;  PRE - abn ct abd  POST - gastritis    INSERTION HEMODIALYSIS CATHETER N/A 07/26/2022    Procedure: RIGHT TUNNELED DIALYSIS CATHETER PLACEMENT;  Surgeon: Diandra Adhikari MD;  Location: [facility] MAIN OR;  Service: Vascular;  Laterality: N/A;    INSERTION HEMODIALYSIS CATHETER Left 11/29/2023    Procedure: TUNNELED DIALYSIS CATHETER PLACEMENT;  Surgeon: Jose Patel II, MD;  Location: [facility] MAIN OR;  Service: Vascular;  Laterality: Left;    INSERTION PERITONEAL DIALYSIS CATHETER N/A 04/03/2023    Procedure: INSERTION PERITONEAL DIALYSIS CATHETER LAPAROSCOPIC, omentumpexy;  Surgeon: Jemal Loyola MD;  Location: [facility] MAIN OR;  Service: General;  Laterality: N/A;    REMOVAL PERITONEAL DIALYSIS CATHETER N/A 12/1/2023    Procedure: REMOVAL PERITONEAL DIALYSIS CATHETER;  Surgeon: Maryellen Ashton MD;  Location: [facility] MAIN OR;  Service: General;  Laterality: N/A;    TONSILLECTOMY        General Information       Row Name 12/05/23 1334          Physical Therapy Time and Intention    Document Type therapy note (daily note) (P)   -JY     Mode of Treatment individual therapy;physical therapy (P)   -JY       Row Name 12/05/23 9421          General Information    Patient Profile Reviewed yes (P)   -JY       Row Name 12/05/23 2861          Safety Issues, Functional Mobility    Comment, Safety Issues/Impairments (Mobility) Gait belt and non skid socks donned (P)   -JY               User Key  (r) = Recorded By, (t) = Taken By, (c) = Cosigned By      Initials Name Provider Type    Blake Ohara, PTA Student PTA Student                    Mobility       Row Name 12/05/23 1332          Bed Mobility    Bed Mobility supine-sit (P)   -JY     Supine-Sit [place] (Bed Mobility) contact guard;verbal cues (P)   -JY     Assistive Device (Bed Mobility) head of bed elevated;bed rails (P)   -JY     Comment, (Bed Mobility) [facility]IC at end of session (P)   -JY       Row Name 12/05/23 1338          Sit-Stand Transfer    Sit-Stand [place] (Transfers) contact guard;verbal cues (P)   -JY     Assistive Device (Sit-Stand Transfers) walker, front-wheeled (P)   -JY     Comment, (Sit-Stand Transfer) 3 STS from EOB to Rwx, CGA (P)   -JY       Row Name 12/05/23 1339          Gait/Stairs (Locomotion)    [place] Level (Gait) contact guard (P)   -JY     Assistive Device (Gait) walker, front-wheeled (P)   -JY     Patient was able to Ambulate yes (P)   -JY     Distance in Feet (Gait) 180' (P)   -JY     Deviations/Abnormal Patterns (Gait) amrita decreased;stride length decreased;gait speed decreased (P)   -JY     Bilateral Gait Deviations heel strike decreased (P)   -JY     Comment, (Gait/Stairs) Pt amb 180' with Rwx CGA, pt's posture has improved from last tx, slow shuffled steps. (P)   -JY               User Key  (r) = Recorded By, (t) = Taken By, (c) = Cosigned By      Initials Name Provider Type    Blake Ohara PTA Student PTA Student                   Obj/Interventions       Row Name 12/05/23 0044          Balance    Balance Assessment sitting static balance;standing static balance;standing dynamic balance (P)   -JY     Static Sitting Balance supervision (P)   -JY     Position, Sitting Balance sitting edge of bed (P)   -JY     Static Standing Balance contact guard (P)   -JY     Dynamic Standing Balance contact guard (P)   -JY     Position/Device Used, Standing Balance walker, front-wheeled (P)   -JY     Comment, Balance Pt stood for prolonged period while getting brief change. Pt able to stand @ Rwx and clean herself with wipe. (P)   -JY        "        User Key  (r) = Recorded By, (t) = Taken By, (c) = Cosigned By      Initials Name Provider Type    Blake Ohara, DINO Student PTA Student                   Goals/Plan    No documentation.                  Clinical Impression       Row Name 12/05/23 1340          Pain    Additional Documentation Pain Scale: FACES Pre/Post-Treatment (Group) (P)   -JY       Row Name 12/05/23 1340          Pain Scale: FACES Pre/Post-Treatment    Pain: FACES Scale, Pretreatment 2-->hurts little bit (P)   -JY     Posttreatment Pain Rating 2-->hurts little bit (P)   -JY     Pre/Posttreatment Pain Comment Pt c/o stomach pain upon standing. Needed seated rest break until sx subsided (P)   -JY       Row Name 12/05/23 1340          Plan of Care Review    Plan of Care Reviewed With patient (P)   -JY     Outcome Evaluation Pt agreeable to walking c PT today. Pt was SBA c bed mob. Pt performed 3 STS c Rwx, CGA. Needs VC for bed mob and STS for sequencing. On 1st STS, pt immediately had a BM followed by stomach pains. Pt sat EOB to rest until sx subsided. Pt says it \"feels like I've taken a laxative.\" Pt stood @ Rwx for prolonged period while getting brief changed. Pt rested EOB before ambulating 180' c Rwx, CGA. Pt's posture and endurance has improved. Pt is still ambulating slowly c short, shuffled steps. Pt finished session UI c OT. (P)   -JY       Row Name 12/05/23 1290          Positioning and Restraints    Pre-Treatment Position in bed (P)   -JY     Post Treatment Position chair (P)   -JY     In Chair call light within reach;exit alarm on;sitting;with OT;notified nsg;encouraged to call for assist (P)   -JY               User Key  (r) = Recorded By, (t) = Taken By, (c) = Cosigned By      Initials Name Provider Type    Blake Ohara PTA Student PTA Student                   Outcome Measures       Row Name 12/05/23 1524          How much help from another person do you currently need...    Turning from your back to your side while " in flat bed without using bedrails? 3 (P)   -JY     Moving from lying on back to sitting on the side of a flat bed without bedrails? 3 (P)   -JY     Moving to and from a bed to a chair (including a wheelchair)? 3 (P)   -JY     Standing up from a chair using your arms (e.g., wheelchair, bedside chair)? 3 (P)   -JY     Climbing 3-5 steps with a railing? 2 (P)   -JY     To walk in hospital room? 3 (P)   -JY     AM-PAC 6 Clicks Score (PT) 17 (P)   -JY     Highest Level of Mobility Goal 5 --> Static standing (P)   -JY       Row Name 12/05/23 1344          Functional Assessment    Outcome Measure Options AM-PAC 6 Clicks Basic Mobility (PT) (P)   -JY               User Key  (r) = Recorded By, (t) = Taken By, (c) = Cosigned By      Initials Name Provider Type    Blake Ohara PTA Student PTA Student                                 Physical Therapy Education       Title: PT OT SLP Therapies (In Progress)       Topic: Physical Therapy (Done)       Point: Mobility training (Done)       Learning Progress Summary             Patient Acceptance, E, DU,VU by FOUZIA at 12/5/2023 1345    Acceptance, E, VU,NR by CARMEN at 12/2/2023 1149    Acceptance, E,TB,D, VU,DU by PH at 11/30/2023 1359    Acceptance, E, DU by FOUZIA at 11/28/2023 1448    Acceptance, E,TB,D, VU,NR by ELICIA at 11/24/2023 1535    Acceptance, E, VU by SK at 11/21/2023 1609    Acceptance, E,TB,D, VU by SK at 11/20/2023 1237    Acceptance, E, NR by  at 11/19/2023 1424    Acceptance, E,TB,D, VU by SK at 11/17/2023 1633    Acceptance, E,D, NR by PC at 11/16/2023 1618    Acceptance, E,TB,D, VU by SK at 11/15/2023 1521    Acceptance, E, VU,NR by SK at 11/13/2023 1247    Acceptance, E,TB,D, VU,NR by  at 11/12/2023 1148   Family Acceptance, E, VU by SK at 11/21/2023 1609                         Point: Home exercise program (Done)       Learning Progress Summary             Patient Acceptance, CONY DUNHAM,NR by CARMEN at 12/2/2023 1149    AcceptanceROLY,WILY,CONY BARTHOLOMEW,ANDREW by PH at 11/30/2023 8368     Acceptance, E, DU by FOUZIA at 11/28/2023 1448    Acceptance, E,TB,D, VU,NR by  at 11/24/2023 1535    Acceptance, E, VU by SK at 11/21/2023 1609    Acceptance, E,TB,D, VU by SK at 11/20/2023 1237    Acceptance, E, NR by  at 11/19/2023 1424    Acceptance, E,TB,D, VU by SK at 11/17/2023 1633    Acceptance, E,D, NR by  at 11/16/2023 1618    Acceptance, E,TB,D, VU by SK at 11/15/2023 1521    Acceptance, E,TB,D, VU,NR by  at 11/12/2023 1148   Family Acceptance, E, VU by SK at 11/21/2023 1609                         Point: Body mechanics (Done)       Learning Progress Summary             Patient Acceptance, E, DU,VU by FOUZIA at 12/5/2023 1345    Acceptance, E, VU,NR by CARMEN at 12/2/2023 1149    Acceptance, E,TB,D, VU,DU by  at 11/30/2023 1359    Acceptance, E, DU by FOUZIA at 11/28/2023 1448    Acceptance, E,TB,D, VU,NR by  at 11/24/2023 1535    Acceptance, E, VU by SK at 11/21/2023 1609    Acceptance, E,TB,D, VU by SK at 11/20/2023 1237    Acceptance, E, NR by  at 11/19/2023 1424    Acceptance, E,TB,D, VU by SK at 11/17/2023 1633    Acceptance, E,D, NR by GAURAV at 11/16/2023 1618    Acceptance, E,TB,D, VU by SK at 11/15/2023 1521    Acceptance, E, VU,NR by SK at 11/13/2023 1247    Acceptance, E,TB,D, VU,NR by  at 11/12/2023 1148   Family Acceptance, E, VU by SK at 11/21/2023 1609                         Point: Precautions (Done)       Learning Progress Summary             Patient Acceptance, E, DU,VU by FOUZIA at 12/5/2023 1345    Acceptance, E, VU,NR by CARMEN at 12/2/2023 1149    Acceptance, E,TB,D, VU,DU by PH at 11/30/2023 1359    Acceptance, E, DU by FOUZIA at 11/28/2023 1448    Acceptance, E, VU by SK at 11/21/2023 1609    Acceptance, E,TB,D, VU by SK at 11/20/2023 1237    Acceptance, E, NR by  at 11/19/2023 1424    Acceptance, E,TB,D, VU by SK at 11/17/2023 1633    Acceptance, E,D, NR by GAURAV at 11/16/2023 1618    Acceptance, E,TB,D, VU by SK at 11/15/2023 1521    Acceptance, E, VU,NR by SK at 11/13/2023 1247    Acceptance,  "E,TB,D, VU,NR by  at 11/12/2023 1148   Family Acceptance, E, VU by SK at 11/21/2023 1609                                         User Key       Initials Effective Dates Name Provider Type Discipline     06/16/21 -  Kim Almendarez, PT Physical Therapist PT    PC 06/16/21 -  Elba Whaley, PT Physical Therapist PT    EE 06/16/21 -  Shea Ceja, PT Physical Therapist PT    PH 06/16/21 -  Vero Erwin, PTA Physical Therapist Assistant PT    DJ 10/25/19 -  Cynthia Villalobos, PT Physical Therapist PT    AH 08/25/23 -  Nae Chaudhary, RN Registered Nurse Nurse    SK 10/10/23 -  Supriya Harris, PT Student PT Student PT    JY 11/08/23 -  Blake Garcia, PTA Student PTA Student PT                  PT Recommendation and Plan     Plan of Care Reviewed With: (P) patient  Outcome Evaluation: (P) Pt agreeable to walking c PT today. Pt was SBA c bed mob. Pt performed 3 STS c Rwx, CGA. Needs VC for bed mob and STS for sequencing. On 1st STS, pt immediately had a BM followed by stomach pains. Pt sat EOB to rest until sx subsided. Pt says it \"feels like I've taken a laxative.\" Pt stood @ Rwx for prolonged period while getting brief changed. Pt rested EOB before ambulating 180' c Rwx, CGA. Pt's posture and endurance has improved. Pt is still ambulating slowly c short, shuffled steps. Pt finished session [facility] c OT.     Time Calculation:         PT Charges       Row Name 12/05/23 1345             Time Calculation    Start Time 1310 (P)   -JY      Stop Time 1320 (P)   -JY      Time Calculation (min) 10 min (P)   -JY      PT Received On 12/05/23 (P)   -JY      PT - Next Appointment 12/06/23 (P)   -JY         Time Calculation- PT    Total Timed Code Minutes- PT 10 minute(s) (P)   -JY         Timed Charges    83777 - PT Therapeutic Activity Minutes 10 (P)   -JY         Total Minutes    Timed Charges Total Minutes 10 (P)   -JY       Total Minutes 10 (P)   -JY                User Key  (r) = Recorded By, (t) = Taken By, (c) = " Cosigned By      Initials Name Provider Type    Blake Ohara PTA Student DINO Student                  Therapy Charges for Today       Code Description Service Date Service Provider Modifiers Qty    85619419663 HC PT THERAPEUTIC ACT EA 15 MIN 12/5/2023 Blake Garcia PTA Student GP 1            PT G-Codes  Outcome Measure Options: (P) AM-PAC 6 Clicks Basic Mobility (PT)  AM-PAC 6 Clicks Score (PT): (P) 17  AM-PAC 6 Clicks Score (OT): 17  Modified Alden Scale: 4 - Moderately severe disability.  Unable to walk without assistance, and unable to attend to own bodily needs without assistance.  PT Discharge Summary  Anticipated Discharge Disposition (PT): (P) skilled nursing facility    DINO Britt  12/5/2023

## 2023-12-05 NOTE — PROGRESS NOTES
[facility] Progress Note      Patient Name: Dee Herrera  : 1990  MRN: [id number]  Primary Care Physician:  Margarita Woods APRN  Date of admission: 10/26/2023    Subjective     Interval History:   Follow-up ESRD.  Dialysis yesterday with 1 unit of blood given.  2 large bowel movements today.  Worked with PT today.    Review of Systems:   As noted above    Objective     Vitals:   Temp:  [97.5 °F (36.4 °C)-99.9 °F (37.7 °C)] 98.6 °F (37 °C)  Heart Rate:  [84-92] 91  Resp:  [16-18] 18  BP: (127-139)/() 127/97    Intake/Output Summary (Last 24 hours) at 2023 1601  Last data filed at 2023 1324  Gross per 24 hour   Intake 568 ml   Output 2400 ml   Net -1832 ml       Physical Exam:    General Appearance: Eyes open, more conversant.  Does answer questions.  Skin: warm and dry  HEENT: oral mucosa dry.   Nonicteric sclera  Neck: Left IJ tunneled dialysis catheter oozing around the exit site..  Lungs: clear to auscultation bilaterally.  Heart: RRR, normal S1 and S2  Abdomen: soft, nontender, nondistended.  +bs  Extremities: no edema.      Scheduled Meds:     [START ON 2023] amLODIPine, 5 mg, Oral, Q24H  aspirin, 81 mg, Nasogastric, Daily  carvedilol, 25 mg, Nasogastric, Q12H  chlorhexidine, 15 mL, Mouth/Throat, Q12H  escitalopram, 10 mg, Nasogastric, Daily  First Mouthwash (Magic Mouthwash), 10 mL, Swish & Spit, Q6H  hydroxychloroquine, 200 mg, Oral, Daily  Lacosamide, 100 mg, Intravenous, Q12H  lactulose, 10 g, Nasogastric, TID  lidocaine, 10 mL, Subcutaneous, Once  metoclopramide, 5 mg, Intravenous, Q6H  mupirocin, , Each Nare, BID  mycophenolate, 250 mg, Oral, Q12H  ondansetron, 4 mg, Oral, TID AC  pantoprazole, 40 mg, Intravenous, Q AM      IV Meds:          Results Reviewed:   I have personally reviewed the results from the time of this admission to 2023 16:01 EST     Results from last 7 days   Lab Units 23  0707 23  0515 23  1043  12/03/23  0405   SODIUM mmol/L 129* 127*  --  131*   POTASSIUM mmol/L 4.3 5.0 5.5* 5.1   CHLORIDE mmol/L 95* 92*  --  98   CO2 mmol/L 22.3 21.2*  --  21.8*   BUN mg/dL 20 54*  --  41*   CREATININE mg/dL 3.45* 6.37*  --  4.93*   CALCIUM mg/dL 8.4* 8.7  --  8.7   BILIRUBIN mg/dL 0.5  --   --   --    ALK PHOS U/L 92  --   --   --    ALT (SGPT) U/L 9  --   --   --    AST (SGOT) U/L 26  --   --   --    GLUCOSE mg/dL 113* 81  --  84       Estimated Creatinine Clearance: 18.4 mL/min (A) (by C-G formula based on SCr of 3.45 mg/dL (H)).    Results from last 7 days   Lab Units 12/05/23  0707 12/04/23  0515 12/03/23  1043 12/03/23  0405 12/01/23  0643 11/30/23  0716   MAGNESIUM mg/dL  --   --  2.0 1.9  --  1.9   PHOSPHORUS mg/dL 3.8 5.3*  --  4.8*   < > 4.4    < > = values in this interval not displayed.             Results from last 7 days   Lab Units 12/05/23  0707 12/04/23 2005 12/04/23  0515 12/03/23  0405 12/02/23  0724 12/01/23  0643   WBC 10*3/mm3 9.60  --  10.24 10.27 8.97 7.40   HEMOGLOBIN g/dL 7.9* 8.0* 6.5* 7.5* 8.3* 8.5*   PLATELETS 10*3/mm3 164  --  182 209 238 238             Assessment / Plan     ASSESSMENT:  ESRD.  Now on hemodialysis.  Overall clinically looks better.  Dialysis tomorrow.  She is oozing from her hemodialysis catheter exit site.  RN to call the vascular surgeon.  2.  Acute CVA cortical infarct in the anterior inferior medial right frontal lobe.  Subacute subdural hematoma right occipital.  3.  Ascending aortic aneurysm with subacute/chronic aortic root dissection.  Status post repair of proximal aortic aneurysm 11/2/2023.  Re- exploration for cardiac tamponade 11/6/2023.  4.  Seizure disorder  on Vimpat  5.  Severe protein calorie malnutrition.   6.  SLE.  Currently off immunosuppression.  C3 mildly low, C4 normal.  Anti-double-stranded DNA antibody elevated 11/20/2023.  Plaquenil and CellCept .  7.  Anemia CKD.  Visually has some oozing from her exit site.  Vascular surgery to  evaluate.  PLAN:  Hemodialysis tomorrow.  Discussed with patient and mother at bedside  Vascular surgery to look at TDC exit site.  Thank you for involving us in the care of Dee Herrera.  Please feel free to call with any questions.    Regi Hyde MD  12/05/23  16:01 [facility]    [facility]  [phone]    Please note that portions of this note were completed with a voice recognition program.

## 2023-12-05 NOTE — THERAPY TREATMENT NOTE
Patient Name: Dee Herrera  : 1990    MRN: [id number]                              Today's Date: 2023       Admit Date: 10/26/2023    Visit Dx:     ICD-10-CM ICD-9-CM   1. Shortness of breath  R06.02 786.05   2. ESRD (end stage renal disease) on dialysis  N18.6 585.6    Z99.2 V45.11   3. Hyponatremia  E87.1 276.1   4. Generalized abdominal pain  R10.84 789.07   5. Elevated lactic acid level  R79.89 276.2   6. Elevated troponin  R79.89 790.6   7. Severe aortic valve regurgitation  I35.1 424.1   8. Pericardial effusion  I31.39 423.9   9. S/P AVR  Z95.2 V43.3   10. Recent cerebrovascular accident (CVA)  Z86.73 V49.89   11. Peritoneal dialysis catheter in place  Z99.2 V45.11   12. End stage renal disease on dialysis  N18.6 585.6    Z99.2 V45.11     Patient Active Problem List   Diagnosis    Vitamin D deficiency    Iron deficiency anemia    Morning stiffness of joints    Iron deficiency anemia, unspecified iron deficiency anemia type    Thrombocytopenia    Acute renal failure (ARF)    Hypertension secondary to other renal disorders    Pancytopenia    Hypoalbuminemia    Volume overload    Ear drainage right    T.T.P. syndrome    Systemic lupus erythematosus    Lupus nephritis, ISN/RPS class IV    Hypokalemia    Hypocalcemia    COVID-19    Hospital discharge follow-up    Stage 5 chronic kidney disease    Cardiac cirrhosis    Pancreatitis    Duodenitis    Regional enteritis of small bowel    Pericardial effusion    End stage renal disease on dialysis    Hemodialysis status    Seizure disorder    Elevated liver function tests    C. difficile colitis    Anemia, chronic disease    Essential hypertension    Peritoneal dialysis catheter in place    Anemia due to chronic kidney disease, on chronic dialysis    Alternating constipation and diarrhea    Abnormal stress test    Hyponatremia    Poor appetite    Moderate malnutrition    Chronic diastolic CHF (congestive heart failure)    Aortic valve lesion    Severe  aortic valve regurgitation    Dissecting ascending aortic aneurysm    Recent cerebrovascular accident (CVA)    Internal jugular (IJ) vein thromboembolism, chronic     Past Medical History:   Diagnosis Date    Anasarca     PER CT SCAN    Dry skin     ESRD (end stage renal disease) on dialysis     TUES, THURS, SAT [address]    History of abdominal pain     History of anemia     History of transfusion     Hypertension     Iron deficiency anemia 09/27/2021    Lupus (systemic lupus erythematosus) 07/30/2022    Migraine     Other specified nutritional anemias     Pericardial effusion     Renal insufficiency     Seizures     STATES LAST WAS 1/2023    Shortness of breath     OCCASIONAL    Vitamin D deficiency 09/27/2021     Past Surgical History:   Procedure Laterality Date    ASCENDING AORTIC ANEURYSM REPAIR W/ MECHANICAL AORTIC VALVE REPLACEMENT N/A 11/2/2023    Procedure: CHETNA STERNOTOMY, AORTIC ROOT REPLACEMENT WITH VALVE SPARING MISHEL PROCEDURE, REPLACEMENT OF ASCENDING AORTA, RIGHT FEMORAL DIALYSIS CATHETER PLACEMENT AND PRP;  Surgeon: Chris Medina MD;  Location: [facility] CVOR;  Service: Cardiothoracic;  Laterality: N/A;    CARDIAC CATHETERIZATION N/A 06/14/2023    Procedure: Coronary angiography;  Surgeon: Juana Taylor MD;  Location: [facility] CATH INVASIVE LOCATION;  Service: Cardiovascular;  Laterality: N/A;    CARDIAC CATHETERIZATION N/A 06/14/2023    Procedure: Left heart cath;  Surgeon: Juana Taylor MD;  Location: [facility] CATH INVASIVE LOCATION;  Service: Cardiovascular;  Laterality: N/A;    CARDIAC CATHETERIZATION N/A 06/14/2023    Procedure: Right Heart Cath;  Surgeon: Juana Taylor MD;  Location: [facility] CATH INVASIVE LOCATION;  Service: Cardiovascular;  Laterality: N/A;    COLONOSCOPY N/A 7/20/2023    Procedure: COLONOSCOPY to cecum with biopsy;  Surgeon: Drew Kaminski MD;  Location: [facility] ENDOSCOPY;  Service: Gastroenterology;  Laterality: N/A;  PRE - diarrhea, constipation  POST - fair  prep, normal    CORONARY ARTERY BYPASS GRAFT N/A 11/6/2023    Procedure: STERNAL EXPLORATION AND WASH OUT;  Surgeon: Jr Mitesh Quiroz MD;  Location: [facility] CVOR;  Service: Cardiothoracic;  Laterality: N/A;    ENDOSCOPY N/A 7/20/2023    Procedure: ESOPHAGOGASTRODUODENOSCOPY with biopsy;  Surgeon: Drew Kaminski MD;  Location: [facility] ENDOSCOPY;  Service: Gastroenterology;  Laterality: N/A;  PRE - abn ct abd  POST - gastritis    INSERTION HEMODIALYSIS CATHETER N/A 07/26/2022    Procedure: RIGHT TUNNELED DIALYSIS CATHETER PLACEMENT;  Surgeon: Diandra Adhikari MD;  Location: [facility] MAIN OR;  Service: Vascular;  Laterality: N/A;    INSERTION HEMODIALYSIS CATHETER Left 11/29/2023    Procedure: TUNNELED DIALYSIS CATHETER PLACEMENT;  Surgeon: Jose Patel II, MD;  Location: [facility] MAIN OR;  Service: Vascular;  Laterality: Left;    INSERTION PERITONEAL DIALYSIS CATHETER N/A 04/03/2023    Procedure: INSERTION PERITONEAL DIALYSIS CATHETER LAPAROSCOPIC, omentumpexy;  Surgeon: Jemal Lyoola MD;  Location: [facility] MAIN OR;  Service: General;  Laterality: N/A;    REMOVAL PERITONEAL DIALYSIS CATHETER N/A 12/1/2023    Procedure: REMOVAL PERITONEAL DIALYSIS CATHETER;  Surgeon: Maryellen Ashton MD;  Location: [facility] MAIN OR;  Service: General;  Laterality: N/A;    TONSILLECTOMY        General Information       Row Name 12/05/23 1607          OT Time and Intention    Document Type therapy note (daily note)  -LAURA     Mode of Treatment individual therapy;occupational therapy  -       Row Name 12/05/23 1607          General Information    Patient Profile Reviewed yes  -       Row Name 12/05/23 1607          Cognition    Orientation Status (Cognition) oriented to;person;place  -       Row Name 12/05/23 1605          Safety Issues, Functional Mobility    Impairments Affecting Function (Mobility) balance;endurance/activity tolerance;strength  -               User Key  (r) = Recorded By, (t) = Taken By, (c) =  Cosigned By      Initials Name Provider Type    Isi Baker OT Occupational Therapist                     Mobility/ADL's       Row Name 12/05/23 1607          Bed Mobility    Comment, (Bed Mobility) seated EOB upon arrival with PT and in chair at the end of session  -       Row Name 12/05/23 1607          Sit-Stand Transfer    Sit-Stand [place] (Transfers) contact guard;verbal cues  -     Assistive Device (Sit-Stand Transfers) walker, front-wheeled  -       Row Name 12/05/23 1607          Functional Mobility    Functional Mobility- Ind. Level contact guard assist  -     Functional Mobility- Device walker, front-wheeled  -     Functional Mobility-Distance (Feet) --  within room and evans to simulate fxl distances  -       Row Name 12/05/23 1607          Toileting Assessment/Training    [place] Level (Toileting) toileting skills;perform perineal hygiene;moderate assist (50% patient effort)  -     Position (Toileting) supported sitting;supported standing  -     Comment, (Toileting) pt able to perform sheila care in standing. required assist for posterior hygiene and management of brief  -               User Key  (r) = Recorded By, (t) = Taken By, (c) = Cosigned By      Initials Name Provider Type    Isi Baker OT Occupational Therapist                   Obj/Interventions       Row Name 12/05/23 1609          Shoulder (Therapeutic Exercise)    Shoulder AROM (Therapeutic Exercise) bilateral;flexion;extension;10 repetitions;sitting;3 sets  forward punches  -       Row Name 12/05/23 1609          Elbow/Forearm (Therapeutic Exercise)    Elbow/Forearm AROM (Therapeutic Exercise) bilateral;flexion;extension;supination;pronation;10 repetitions;3 sets  -       Row Name 12/05/23 1609          Balance    Static Sitting Balance supervision  -     Dynamic Sitting Balance supervision  -     Position, Sitting Balance sitting edge of bed  -     Static Standing Balance contact  guard;verbal cues  -     Dynamic Standing Balance contact guard;verbal cues  -     Position/Device Used, Standing Balance walker, front-wheeled  -               User Key  (r) = Recorded By, (t) = Taken By, (c) = Cosigned By      Initials Name Provider Type    Isi Baker OT Occupational Therapist                   Goals/Plan    No documentation.                  Clinical Impression       Row Name 12/05/23 1610          Pain Assessment    Pretreatment Pain Rating 0/10 - no pain  -KA     Posttreatment Pain Rating 0/10 - no pain  -KA       Row Name 12/05/23 1610          Plan of Care Review    Plan of Care Reviewed With patient  -     Outcome Evaluation Pt seen for OT session today. Seated EOB with PT upon arrival. She required mod A with toileting needs. She was able to stand and perform sheila care today and required assist for posterior hygiene. She demo improvement in functional mobility distance today. Sat in chair and perform AROM of BUE to improve overall endurance and strength. Pt to continue to benefit from skilled OT to address deficits.  -       Row Name 12/05/23 1610          Vital Signs    Pre Patient Position Sitting  -KA     Intra Patient Position Standing  -KA     Post Patient Position Sitting  -KA     Recovery Time Needs time during exercises to rest. pt with slow movements with all tasks  -       Row Name 12/05/23 1610          Positioning and Restraints    Pre-Treatment Position in bed  -KA     Post Treatment Position chair  -KA     In Chair notified nsg;reclined;call light within reach;encouraged to call for assist;exit alarm on  -               User Key  (r) = Recorded By, (t) = Taken By, (c) = Cosigned By      Initials Name Provider Type    Isi Baker OT Occupational Therapist                   Outcome Measures       Row Name 12/05/23 1611          How much help from another is currently needed...    Putting on and taking off regular lower body clothing? 3  -KA      Bathing (including washing, rinsing, and drying) 2  -KA     Toileting (which includes using toilet bed pan or urinal) 3  -KA     Putting on and taking off regular upper body clothing 3  -KA     Taking care of personal grooming (such as brushing teeth) 3  -KA     Eating meals 3  -KA     AM-PAC 6 Clicks Score (OT) 17  -KA       Row Name 12/05/23 1344          How much help from another person do you currently need...    Turning from your back to your side while in flat bed without using bedrails? 3 (P)   -JY     Moving from lying on back to sitting on the side of a flat bed without bedrails? 3 (P)   -JY     Moving to and from a bed to a chair (including a wheelchair)? 3 (P)   -JY     Standing up from a chair using your arms (e.g., wheelchair, bedside chair)? 3 (P)   -JY     Climbing 3-5 steps with a railing? 2 (P)   -JY     To walk in hospital room? 3 (P)   -JY     AM-PAC 6 Clicks Score (PT) 17 (P)   -JY     Highest Level of Mobility Goal 5 --> Static standing (P)   -JY       Row Name 12/05/23 1611 12/05/23 1344       Functional Assessment    Outcome Measure Options AM-PAC 6 Clicks Daily Activity (OT)  -KA AM-PAC 6 Clicks Basic Mobility (PT) (P)   -JY              User Key  (r) = Recorded By, (t) = Taken By, (c) = Cosigned By      Initials Name Provider Type    Isi Baker OT Occupational Therapist    Blake Ohara, DINO Student PTA Student                    Occupational Therapy Education       Title: PT OT SLP Therapies (In Progress)       Topic: Occupational Therapy (In Progress)       Point: ADL training (Done)       Description:   Instruct learner(s) on proper safety adaptation and remediation techniques during self care or transfers.   Instruct in proper use of assistive devices.                  Learning Progress Summary             Patient Acceptance, E, VU by  at 11/28/2023 1340    Comment: progress towards goals, OT POC, AROM HEP of UEs to increase endurance/strength   Family Acceptance, E, VU by   at 11/28/2023 1340    Comment: progress towards goals, OT POC, AROM HEP of UEs to increase endurance/strength                         Point: Home exercise program (Not Started)       Description:   Instruct learner(s) on appropriate technique for monitoring, assisting and/or progressing therapeutic exercises/activities.                  Learner Progress:  Not documented in this visit.              Point: Precautions (Not Started)       Description:   Instruct learner(s) on prescribed precautions during self-care and functional transfers.                  Learner Progress:  Not documented in this visit.              Point: Body mechanics (Not Started)       Description:   Instruct learner(s) on proper positioning and spine alignment during self-care, functional mobility activities and/or exercises.                  Learner Progress:  Not documented in this visit.                              User Key       Initials Effective Dates Name Provider Type Discipline     04/02/20 -  Etta Bustillos, OT Occupational Therapist OT                  OT Recommendation and Plan     Plan of Care Review  Plan of Care Reviewed With: patient  Outcome Evaluation: Pt seen for OT session today. Seated EOB with PT upon arrival. She required mod A with toileting needs. She was able to stand and perform sheila care today and required assist for posterior hygiene. She demo improvement in functional mobility distance today. Sat in chair and perform AROM of BUE to improve overall endurance and strength. Pt to continue to benefit from skilled OT to address deficits.     Time Calculation:         Time Calculation- OT       Row Name 12/05/23 1612             Time Calculation- OT    OT Start Time 1320  -KA      OT Stop Time 1340  -KA      OT Time Calculation (min) 20 min  -KA      Total Timed Code Minutes- OT 20 minute(s)  -KA      OT Received On 12/05/23  -KA      OT - Next Appointment 12/06/23  -         Timed Charges    53400 - OT Self  Care/Mgmt Minutes 20  -KA         Total Minutes    Timed Charges Total Minutes 20  -KA       Total Minutes 20  -KA                User Key  (r) = Recorded By, (t) = Taken By, (c) = Cosigned By      Initials Name Provider Type    Isi Baker OT Occupational Therapist                  Therapy Charges for Today       Code Description Service Date Service Provider Modifiers Qty    [id number] HC OT SELF CARE/MGMT/TRAIN EA 15 MIN 12/5/2023 Isi Correa OT GO 1                 Isi Correa OT  12/5/2023

## 2023-12-05 NOTE — PLAN OF CARE
Goal Outcome Evaluation:      Gave patient prn Dulcolax, had bm.  Oozing from Dialysis site is slowing down, at beginning of shift jessica blood visible.  Patient did not want to be adjusted or pulled up in bed this shift.

## 2023-12-05 NOTE — NURSING NOTE
ATTEMPTING TO COMPLETE PRBC UNIT   # [id number]. IT WAS NOT THERE TO SIGN OFF. THIS UNIT INADVERTENTLY WAS DISCONTINUED, HOWEVER WRITTEN IN THE DIALYSIS ORDERS TO TRANSFUSE 1 UNIT PRBC'S. THIS UNIT WAS HUNG AT 1624 AND COMPLETED AT 1730. NO REACTION NOTED/ NO C/O. PER BLOOD BANK MANUAL PAPER WAS FILLED OUT AND RETURNED TO BLOOD BANK WITH INFORMATION TO BE SCANNED.

## 2023-12-05 NOTE — PROGRESS NOTES
Name: Dee Herrera ADMIT: 10/26/2023   : 1990  PCP: Margarita Woods APRN    MRN: [id number] LOS: 40 days   AGE/SEX: 33 y.o. female  ROOM: [facility]     Subjective   Subjective   No acute events overnight.  Patient states she is not having any pain this morning.  She is still somewhat withdrawn and quiet.  I did ask her this morning if she would like to continue going forth with our current goals of treatment and she stated yes.  Her hope is still to make it to rehab and get strong enough to go home.    Objective   Objective   Vital Signs  Temp:  [97.5 °F (36.4 °C)-99.9 °F (37.7 °C)] 98.6 °F (37 °C)  Heart Rate:  [] 91  Resp:  [16-18] 18  BP: (105-139)/() 127/97  SpO2:  [96 %] 96 %  on   ;   Device (Oxygen Therapy): room air  Body mass index is 18.62 kg/m².  Physical Exam  General: Alert, no acute distress.  Ill-appearing.  Somewhat withdrawn but answers all questions appropriately.  ENT: Left-sided Shiley catheter in place.  Neuro: Eyes open and moving in all directions, strength normal in all extremities, no focal deficits.   Lungs: Clear to auscultation bilaterally. No wheeze or crackles. No distress.   Heart: RRR, no murmurs. No edema.  Abdomen: Soft, non-tender, non-distended. Normal bowel sounds.   Ext: Warm and well-perfused. No edema.   Skin: Sternotomy scar healing well.  No surrounding erythema or drainage.    Results Review     I reviewed the patient's new clinical results.  Results from last 7 days   Lab Units 23  0707 23  2005 23  0515 23  0405 23  0724   WBC 10*3/mm3 9.60  --  10.24 10.27 8.97   HEMOGLOBIN g/dL 7.9* 8.0* 6.5* 7.5* 8.3*   PLATELETS 10*3/mm3 164  --  182 209 238     Results from last 7 days   Lab Units 23  0707 23  0515 23  1043 23  0405 23  1834 23  0724   SODIUM mmol/L 129* 127*  --  131*  --  129*   POTASSIUM mmol/L 4.3 5.0 5.5* 5.1   < > 5.3*   CHLORIDE mmol/L 95* 92*  --  98  --  96*   CO2  mmol/L 22.3 21.2*  --  21.8*  --  23.2   BUN mg/dL 20 54*  --  41*  --  27*   CREATININE mg/dL 3.45* 6.37*  --  4.93*  --  3.38*   GLUCOSE mg/dL 113* 81  --  84  --  89   EGFR mL/min/1.73 17.5* 8.4*  --  11.4*  --  17.9*    < > = values in this interval not displayed.     Results from last 7 days   Lab Units 12/05/23  0707 12/04/23  0515 12/03/23  0405 12/02/23  0724   ALBUMIN g/dL 2.1* 2.2* 2.2* 2.3*   BILIRUBIN mg/dL 0.5  --   --   --    ALK PHOS U/L 92  --   --   --    AST (SGOT) U/L 26  --   --   --    ALT (SGPT) U/L 9  --   --   --      Results from last 7 days   Lab Units 12/05/23  0707 12/04/23  0515 12/03/23  1043 12/03/23  0405 12/02/23  0724 12/01/23  0643 11/30/23  0716 11/29/23  0546   CALCIUM mg/dL 8.4* 8.7  --  8.7 8.8   < > 8.7 8.9   ALBUMIN g/dL 2.1* 2.2*  --  2.2* 2.3*   < > 2.0* 2.1*   MAGNESIUM mg/dL  --   --  2.0 1.9  --   --  1.9 2.1   PHOSPHORUS mg/dL 3.8 5.3*  --  4.8* 4.6*   < > 4.4 3.9    < > = values in this interval not displayed.       Glucose   Date/Time Value Ref Range Status   12/05/2023 1148 137 (H) 70 - 130 mg/dL Final   12/04/2023 0615 86 70 - 130 mg/dL Final   12/03/2023 2045 116 70 - 130 mg/dL Final   12/03/2023 1804 134 (H) 70 - 130 mg/dL Final   12/03/2023 1154 93 70 - 130 mg/dL Final   12/03/2023 0601 89 70 - 130 mg/dL Final   12/02/2023 2040 117 70 - 130 mg/dL Final       No radiology results for the last day    I have personally reviewed all medications:  Scheduled Medications  amLODIPine, 5 mg, Oral, Q24H  aspirin, 81 mg, Nasogastric, Daily  carvedilol, 25 mg, Nasogastric, Q12H  chlorhexidine, 15 mL, Mouth/Throat, Q12H  escitalopram, 10 mg, Nasogastric, Daily  First Mouthwash (Magic Mouthwash), 10 mL, Swish & Spit, Q6H  hydroxychloroquine, 200 mg, Oral, Daily  Lacosamide, 100 mg, Intravenous, Q12H  lactulose, 10 g, Nasogastric, TID  lidocaine, 10 mL, Subcutaneous, Once  metoclopramide, 5 mg, Intravenous, Q6H  mupirocin, , Each Nare, BID  mycophenolate, 250 mg, Oral,  Q12H  ondansetron, 4 mg, Oral, TID AC  pantoprazole, 40 mg, Intravenous, Q AM    Infusions     Diet  Diet: Regular/House Diet; Texture: Regular Texture (IDDSI 7); Fluid Consistency: Thin (IDDSI 0)    Assessment/Plan     Active Hospital Problems    Diagnosis  POA    **Dissecting ascending aortic aneurysm [I71.010]  Yes    Internal jugular (IJ) vein thromboembolism, chronic [I82.C29]  Unknown    Recent cerebrovascular accident (CVA) [Z86.73]  Yes    Aortic valve lesion [I35.8]  Yes    Severe aortic valve regurgitation [I35.1]  Yes    Hyponatremia [E87.1]  Yes    Moderate malnutrition [E44.0]  Yes    Chronic diastolic CHF (congestive heart failure) [I50.32]  Yes    Anemia due to chronic kidney disease, on chronic dialysis [N18.6, D63.1, Z99.2]  Not Applicable    Essential hypertension [I10]  Yes    End stage renal disease on dialysis [N18.6, Z99.2]  Not Applicable    Seizure disorder [G40.909]  Yes    Elevated liver function tests [R79.89]  Yes    Systemic lupus erythematosus [M32.9]  Yes    Thrombocytopenia [D69.6]  Yes      Resolved Hospital Problems    Diagnosis Date Resolved POA    Abdominal pain [R10.9] 10/28/2023 Yes       33 y.o. female admitted with Dissecting ascending aortic aneurysm.    Ascending aortic aneurysm with dissection  Cardiac tamponade  Severe aortic valve insufficiency  -11/2/2023 for dissection repair and replacement, 11/6/2023 for clot removal compressing RV with cardiac tamponade  -Most recent echocardiogram EF 65%, trivial pericardial effusion  -CT surgery continues to follow    Anemia  -Hgb improved today, s/p 1 unit PRBC 12/4  -No signs of active bleeding, but hemoglobin has been slowly trickling down  -Monitor with daily CBC, transfuse for Hgb less than 7  -Fecal occult pending  -Ensure hemoglobin remains stable tomorrow, and will restart Eliquis at that time if no further decrease in hemoglobin    Right IJ DVT, chronic  -Found during placement of tunneled dialysis catheter  -On Eliquis,  holding as above  -Hopefully can restart tomorrow if hemoglobin stable    HTN  -Continue Coreg 25 mg every 12 hours and amlodipine 5 mg daily  -Diastolic BP still running high; cardiology would like to add ACE or ARB if okay with nephrology  -Continue to closely monitor, titrate medications as needed    ESRD  Hyponatremia, hyperkalemia  -Secondary to lupus nephritis, on peritoneal dialysis at home but patient has declined wanting to do PD anymore and has been agreeable to HD  -Sodium low at 129 today, potassium 4.3  -Nephrology consulted and following  -PD catheter now removed  -Continue HD via [facility], nephrology managing  -Fluid restriction to 1000 cc per 24 hours  -Monitor very closely with daily BMP    Lupus  -Continue CellCept and Plaquenil    History of CVA  -Right MCA stroke on admission, confirmed by MRI  -Continue ASA per neurology    Depression  -Continue Lexapro  -Access and psych Been following    Seizure disorder  -Continue lacosamide      Etta Rodriguez MD  [facility]  12/05/23  14:27 EST

## 2023-12-05 NOTE — PROGRESS NOTES
Continued Stay Note  [facility]     Patient Name: Dee Herrera  MRN: [id number]  Today's Date: 12/5/2023    Admit Date: 10/26/2023    Plan: [facility]   Discharge Plan       Row Name 12/05/23 1458       Plan    Plan [facility]    Patient/Family in Agreement with Plan yes    Plan Comments Spoke with [facility] has skilled bed available and has confirmation for HD chair.  Per attending MD - anticipate DC later this week.  Packet in CCP office.  CCP continues to follow.  Elif OZUNA                 Expected Discharge Date and Time       Expected Discharge Date Expected Discharge Time    Dec 7, 2023               Becky S. [place], RN

## 2023-12-05 NOTE — PROGRESS NOTES
Nutrition Services    Patient Name:  Dee Herrera  YOB: 1990  MRN: [id number]  Admit Date:  10/26/2023    Assessment Date:  12/05/23    CLINICAL NUTRITION - PROGRESS NOTE       Reason for Encounter Follow-up         Nutrition Order Diet: Regular/House Diet; Texture: Regular Texture (IDDSI 7); Fluid Consistency: Thin (IDDSI 0)    Supplements/Snacks Magic Cup BID    EN/PN Order N/a         Pertinent Information Eating bites of meals-50%.  HD yesterday with 2.4L removed.  Refused PEG per chart review.  PD catheter removed.  Magic Cups in place BID.  Working with PT at time of attempted visit.        Intervention/Plan RD to continue to follow.     RD to follow up per protocol.    Electronically signed by:  Shell Horn RD  12/05/23 14:20 EST

## 2023-12-05 NOTE — PLAN OF CARE
Goal Outcome Evaluation:      Denies pain, denies nausea, dony meals, vitals stable, tunneled cath dressing change, bm x2

## 2023-12-06 LAB
ALBUMIN SERPL-MCNC: 2.3 G/DL (ref 3.5–5.2)
ALBUMIN/GLOB SERPL: 0.7 G/DL
ALP SERPL-CCNC: 98 U/L (ref 39–117)
ALT SERPL W P-5'-P-CCNC: 9 U/L (ref 1–33)
ANION GAP SERPL CALCULATED.3IONS-SCNC: 12 MMOL/L (ref 5–15)
AST SERPL-CCNC: 26 U/L (ref 1–32)
BILIRUB SERPL-MCNC: 0.3 MG/DL (ref 0–1.2)
BUN SERPL-MCNC: 33 MG/DL (ref 6–20)
BUN/CREAT SERPL: 6.1 (ref 7–25)
CALCIUM SPEC-SCNC: 8.7 MG/DL (ref 8.6–10.5)
CHLORIDE SERPL-SCNC: 96 MMOL/L (ref 98–107)
CO2 SERPL-SCNC: 21 MMOL/L (ref 22–29)
CREAT SERPL-MCNC: 5.37 MG/DL (ref 0.57–1)
DEPRECATED RDW RBC AUTO: 45.3 FL (ref 37–54)
EGFRCR SERPLBLD CKD-EPI 2021: 10.3 ML/MIN/1.73
ERYTHROCYTE [DISTWIDTH] IN BLOOD BY AUTOMATED COUNT: 14.6 % (ref 12.3–15.4)
GLOBULIN UR ELPH-MCNC: 3.3 GM/DL
GLUCOSE BLDC GLUCOMTR-MCNC: 82 MG/DL (ref 70–130)
GLUCOSE SERPL-MCNC: 96 MG/DL (ref 65–99)
HCT VFR BLD AUTO: 23.9 % (ref 34–46.6)
HGB BLD-MCNC: 7.9 G/DL (ref 12–15.9)
MCH RBC QN AUTO: 28.4 PG (ref 26.6–33)
MCHC RBC AUTO-ENTMCNC: 33.1 G/DL (ref 31.5–35.7)
MCV RBC AUTO: 86 FL (ref 79–97)
PLATELET # BLD AUTO: 155 10*3/MM3 (ref 140–450)
PMV BLD AUTO: 11 FL (ref 6–12)
POTASSIUM SERPL-SCNC: 4.6 MMOL/L (ref 3.5–5.2)
PROT SERPL-MCNC: 5.6 G/DL (ref 6–8.5)
RBC # BLD AUTO: 2.78 10*6/MM3 (ref 3.77–5.28)
SODIUM SERPL-SCNC: 129 MMOL/L (ref 136–145)
WBC NRBC COR # BLD AUTO: 9.55 10*3/MM3 (ref 3.4–10.8)

## 2023-12-06 PROCEDURE — 99232 SBSQ HOSP IP/OBS MODERATE 35: CPT | Performed by: NURSE PRACTITIONER

## 2023-12-06 PROCEDURE — C9254 INJECTION, LACOSAMIDE: HCPCS | Performed by: SURGERY

## 2023-12-06 PROCEDURE — 25010000002 LACOSAMIDE 200 MG/20ML SOLUTION: Performed by: SURGERY

## 2023-12-06 PROCEDURE — 80053 COMPREHEN METABOLIC PANEL: CPT | Performed by: STUDENT IN AN ORGANIZED HEALTH CARE EDUCATION/TRAINING PROGRAM

## 2023-12-06 PROCEDURE — 63710000001 ONDANSETRON PER 8 MG: Performed by: SURGERY

## 2023-12-06 PROCEDURE — 82948 REAGENT STRIP/BLOOD GLUCOSE: CPT

## 2023-12-06 PROCEDURE — 63710000001 MYCOPHENOLATE MOFETIL PER 250 MG: Performed by: SURGERY

## 2023-12-06 PROCEDURE — 97530 THERAPEUTIC ACTIVITIES: CPT

## 2023-12-06 PROCEDURE — 85027 COMPLETE CBC AUTOMATED: CPT | Performed by: STUDENT IN AN ORGANIZED HEALTH CARE EDUCATION/TRAINING PROGRAM

## 2023-12-06 RX ORDER — AMLODIPINE BESYLATE 10 MG/1
10 TABLET ORAL
Status: DISCONTINUED | OUTPATIENT
Start: 2023-12-07 | End: 2023-12-09 | Stop reason: HOSPADM

## 2023-12-06 RX ADMIN — ONDANSETRON HYDROCHLORIDE 4 MG: 4 TABLET, FILM COATED ORAL at 07:55

## 2023-12-06 RX ADMIN — AMLODIPINE BESYLATE 5 MG: 5 TABLET ORAL at 07:57

## 2023-12-06 RX ADMIN — LACOSAMIDE 100 MG: 10 INJECTION INTRAVENOUS at 22:40

## 2023-12-06 RX ADMIN — ONDANSETRON HYDROCHLORIDE 4 MG: 4 TABLET, FILM COATED ORAL at 17:45

## 2023-12-06 RX ADMIN — ESCITALOPRAM OXALATE 10 MG: 10 TABLET, FILM COATED ORAL at 09:12

## 2023-12-06 RX ADMIN — APIXABAN 5 MG: 5 TABLET, FILM COATED ORAL at 20:23

## 2023-12-06 RX ADMIN — MYCOPHENOLATE MOFETIL 250 MG: 500 TABLET, FILM COATED ORAL at 20:23

## 2023-12-06 RX ADMIN — ASPIRIN 81 MG: 81 TABLET, CHEWABLE ORAL at 07:56

## 2023-12-06 RX ADMIN — PANTOPRAZOLE SODIUM 40 MG: 40 TABLET, DELAYED RELEASE ORAL at 07:55

## 2023-12-06 RX ADMIN — HYDROCODONE BITARTRATE AND ACETAMINOPHEN 1 TABLET: 7.5; 325 TABLET ORAL at 07:54

## 2023-12-06 RX ADMIN — MYCOPHENOLATE MOFETIL 250 MG: 500 TABLET, FILM COATED ORAL at 09:13

## 2023-12-06 RX ADMIN — CARVEDILOL 25 MG: 25 TABLET, FILM COATED ORAL at 07:56

## 2023-12-06 RX ADMIN — HYDROXYCHLOROQUINE SULFATE 200 MG: 200 TABLET ORAL at 09:12

## 2023-12-06 RX ADMIN — LACOSAMIDE 100 MG: 10 INJECTION INTRAVENOUS at 09:12

## 2023-12-06 RX ADMIN — CARVEDILOL 25 MG: 25 TABLET, FILM COATED ORAL at 20:23

## 2023-12-06 RX ADMIN — ONDANSETRON HYDROCHLORIDE 4 MG: 4 TABLET, FILM COATED ORAL at 12:44

## 2023-12-06 NOTE — PLAN OF CARE
Problem: Adult Inpatient Plan of Care  Goal: Plan of Care Review  12/6/2023 0958 by Bria Felton RN  Outcome: Ongoing, Progressing  Flowsheets  Taken 12/6/2023 0958  Progress: improving  Taken 12/6/2023 0957  Outcome Evaluation: patient with r side pain in AM, medication given and pain improved.  12/6/2023 0957 by Bria Felton RN  Outcome: Ongoing, Progressing  Flowsheets  Taken 12/6/2023 0957 by Bria Felton RN  Plan of Care Reviewed With: patient  Outcome Evaluation: patient with r side pain in AM, medication given and pain improved.  Taken 12/3/2023 0337 by Kimberlyn Vyas RN  Progress: improving   Goal Outcome Evaluation:

## 2023-12-06 NOTE — PLAN OF CARE
Goal Outcome Evaluation:      Patient sat up in chair for most of shift, VSS, gave prn pain med for foot pain.

## 2023-12-06 NOTE — PROGRESS NOTES
Ok to DC to SNF from our standpoint when OK with admitting. CV stable. Doing well from a surgical standpoint. No need to follow-up in our office, as incisions are well healed at this point.     Shower daily. Clean incisions with warm water and antibacterial soap only. Do not put any lotion or ointments on incisions for 12 weeks.  No lifting greater than 50 pounds for 12 weeks  Ambulate 10 minutes at day     Please reach out with further questions or concerns.     Electronically signed by CAESAR Alonzo, 12/06/23, 10:43 AM EST.

## 2023-12-06 NOTE — THERAPY TREATMENT NOTE
Patient Name: Dee Herrera  : 1990    MRN: [id number]                              Today's Date: 2023       Admit Date: 10/26/2023    Visit Dx:     ICD-10-CM ICD-9-CM   1. Shortness of breath  R06.02 786.05   2. ESRD (end stage renal disease) on dialysis  N18.6 585.6    Z99.2 V45.11   3. Hyponatremia  E87.1 276.1   4. Generalized abdominal pain  R10.84 789.07   5. Elevated lactic acid level  R79.89 276.2   6. Elevated troponin  R79.89 790.6   7. Severe aortic valve regurgitation  I35.1 424.1   8. Pericardial effusion  I31.39 423.9   9. S/P AVR  Z95.2 V43.3   10. Recent cerebrovascular accident (CVA)  Z86.73 V49.89   11. Peritoneal dialysis catheter in place  Z99.2 V45.11   12. End stage renal disease on dialysis  N18.6 585.6    Z99.2 V45.11     Patient Active Problem List   Diagnosis    Vitamin D deficiency    Iron deficiency anemia    Morning stiffness of joints    Iron deficiency anemia, unspecified iron deficiency anemia type    Thrombocytopenia    Acute renal failure (ARF)    Hypertension secondary to other renal disorders    Pancytopenia    Hypoalbuminemia    Volume overload    Ear drainage right    T.T.P. syndrome    Systemic lupus erythematosus    Lupus nephritis, ISN/RPS class IV    Hypokalemia    Hypocalcemia    COVID-19    Hospital discharge follow-up    Stage 5 chronic kidney disease    Cardiac cirrhosis    Pancreatitis    Duodenitis    Regional enteritis of small bowel    Pericardial effusion    End stage renal disease on dialysis    Hemodialysis status    Seizure disorder    Elevated liver function tests    C. difficile colitis    Anemia, chronic disease    Essential hypertension    Peritoneal dialysis catheter in place    Anemia due to chronic kidney disease, on chronic dialysis    Alternating constipation and diarrhea    Abnormal stress test    Hyponatremia    Poor appetite    Moderate malnutrition    Chronic diastolic CHF (congestive heart failure)    Aortic valve lesion    Severe  aortic valve regurgitation    Dissecting ascending aortic aneurysm    Recent cerebrovascular accident (CVA)    Internal jugular (IJ) vein thromboembolism, chronic     Past Medical History:   Diagnosis Date    Anasarca     PER CT SCAN    Dry skin     ESRD (end stage renal disease) on dialysis     TUES, THURS, SAT [address]    History of abdominal pain     History of anemia     History of transfusion     Hypertension     Iron deficiency anemia 09/27/2021    Lupus (systemic lupus erythematosus) 07/30/2022    Migraine     Other specified nutritional anemias     Pericardial effusion     Renal insufficiency     Seizures     STATES LAST WAS 1/2023    Shortness of breath     OCCASIONAL    Vitamin D deficiency 09/27/2021     Past Surgical History:   Procedure Laterality Date    ASCENDING AORTIC ANEURYSM REPAIR W/ MECHANICAL AORTIC VALVE REPLACEMENT N/A 11/2/2023    Procedure: CHETNA STERNOTOMY, AORTIC ROOT REPLACEMENT WITH VALVE SPARING MISHEL PROCEDURE, REPLACEMENT OF ASCENDING AORTA, RIGHT FEMORAL DIALYSIS CATHETER PLACEMENT AND PRP;  Surgeon: Chris Medina MD;  Location: [facility] CVOR;  Service: Cardiothoracic;  Laterality: N/A;    CARDIAC CATHETERIZATION N/A 06/14/2023    Procedure: Coronary angiography;  Surgeon: Juana Taylor MD;  Location: [facility] CATH INVASIVE LOCATION;  Service: Cardiovascular;  Laterality: N/A;    CARDIAC CATHETERIZATION N/A 06/14/2023    Procedure: Left heart cath;  Surgeon: Juana Taylor MD;  Location: [facility] CATH INVASIVE LOCATION;  Service: Cardiovascular;  Laterality: N/A;    CARDIAC CATHETERIZATION N/A 06/14/2023    Procedure: Right Heart Cath;  Surgeon: Juana Taylor MD;  Location: [facility] CATH INVASIVE LOCATION;  Service: Cardiovascular;  Laterality: N/A;    COLONOSCOPY N/A 7/20/2023    Procedure: COLONOSCOPY to cecum with biopsy;  Surgeon: Drew Kaminski MD;  Location: [facility] ENDOSCOPY;  Service: Gastroenterology;  Laterality: N/A;  PRE - diarrhea, constipation  POST - fair  prep, normal    CORONARY ARTERY BYPASS GRAFT N/A 11/6/2023    Procedure: STERNAL EXPLORATION AND WASH OUT;  Surgeon: Jr Mitesh Quiroz MD;  [place] [facility];  Service: Cardiothoracic;  Laterality: N/A;    ENDOSCOPY N/A 7/20/2023    Procedure: ESOPHAGOGASTRODUODENOSCOPY with biopsy;  Surgeon: Drew Kaminski MD;  Location: [facility] ENDOSCOPY;  Service: Gastroenterology;  Laterality: N/A;  PRE - abn ct abd  POST - gastritis    INSERTION HEMODIALYSIS CATHETER N/A 07/26/2022    Procedure: RIGHT TUNNELED DIALYSIS CATHETER PLACEMENT;  Surgeon: Diandra Adhikari MD;  Location: [facility];  Service: Vascular;  Laterality: N/A;    INSERTION HEMODIALYSIS CATHETER Left 11/29/2023    Procedure: TUNNELED DIALYSIS CATHETER PLACEMENT;  Surgeon: Jose Patel II, MD;  Location: [facility];  Service: Vascular;  Laterality: Left;    INSERTION PERITONEAL DIALYSIS CATHETER N/A 04/03/2023    Procedure: INSERTION PERITONEAL DIALYSIS CATHETER LAPAROSCOPIC, omentumpexy;  Surgeon: Jemal Loyola MD;  Location: [facility];  Service: General;  Laterality: N/A;    REMOVAL PERITONEAL DIALYSIS CATHETER N/A 12/1/2023    Procedure: REMOVAL PERITONEAL DIALYSIS CATHETER;  Surgeon: Maryellen Ashton MD;  Location: [facility];  Service: General;  Laterality: N/A;    TONSILLECTOMY        General Information       Row Name 12/06/23 1131          Physical Therapy Time and Intention    Document Type therapy note (daily note)  -PH     Mode of Treatment physical therapy  -       Row Name 12/06/23 1131          General Information    Existing Precautions/Restrictions fall;sternal;cardiac  -PH       Row Name 12/06/23 1131          Safety Issues, Functional Mobility    Impairments Affecting Function (Mobility) balance;endurance/activity tolerance;strength  -PH     Comment, Safety Issues/Impairments (Mobility) gt belt and non skid socks donned; fatigues quickly  -PH               User Key  (r) = Recorded By, (t) = Taken  By, (c) = Cosigned By      Initials Name Provider Type    PH Vero Erwin PTA Physical Therapist Assistant                   Mobility       Row Name 12/06/23 1131          Bed Mobility    Rolling Right [place] (Bed Mobility) not tested  -PH     Scooting/Bridging [place] (Bed Mobility) not tested  -PH     Supine-Sit [place] (Bed Mobility) not tested  -PH     Sit-Supine [place] (Bed Mobility) not tested  -PH     Supine-Sit-Supine [place] (Bed Mobility) not tested  -PH     Comment, (Bed Mobility) [facility] and returned to chair  -PH       Row Name 12/06/23 1131          Sit-Stand Transfer    Sit-Stand [place] (Transfers) contact guard  -PH     Assistive Device (Sit-Stand Transfers) walker, front-wheeled  -PH       Row Name 12/06/23 1131          Gait/Stairs (Locomotion)    [place] Level (Gait) contact guard;verbal cues  -PH     Assistive Device (Gait) walker, front-wheeled  -PH     Distance in Feet (Gait) 180'+  -PH     Deviations/Abnormal Patterns (Gait) amrita decreased;stride length decreased  -PH     Bilateral Gait Deviations heel strike decreased;forward flexed posture  -PH     [place] Level (Stairs) not tested  -PH     Comment, (Gait/Stairs) gait slow w/ no overt LOB; improved distance  -PH               User Key  (r) = Recorded By, (t) = Taken By, (c) = Cosigned By      Initials Name Provider Type    PH Vero Erwin PTA Physical Therapist Assistant                   Obj/Interventions       Row Name 12/06/23 1132          Motor Skills    Therapeutic Exercise other (see comments)  BAP, LAQ, seated march; x 10 reps all  -PH       Row Name 12/06/23 1132          Balance    Balance Assessment sitting static balance;standing static balance  -PH     Static Sitting Balance standby assist  -PH     Static Standing Balance supervision  -PH     Position/Device Used, Standing Balance walker, front-wheeled  -PH               User Key  (r) = Recorded By, (t) = Taken By,  (c) = Cosigned By      Initials Name Provider Type     Vero Erwin PTA Physical Therapist Assistant                   Goals/Plan    No documentation.                  Clinical Impression       Row Name 12/06/23 1133          Pain    Pretreatment Pain Rating 0/10 - no pain  -PH     Posttreatment Pain Rating 0/10 - no pain  -PH     Additional Documentation Pain Scale: Numbers Pre/Post-Treatment (Group)  -PH       Row Name 12/06/23 1133          Plan of Care Review    Plan of Care Reviewed With patient  -PH     Progress improving  -PH     Outcome Evaluation Pt was [facility] at beg of PT tx. Pt stood req CGA and fww. Pt then amb 180'+ - an improved distance - req CGA and use of fww. Pt was slow w/ no overt LOB. Activity intolerance is still limiting although pt is motivated and improving. Pt performed ther ex for strengthening and was [facility] at end of session. PT will prog as pt dony.  -PH       Row Name 12/06/23 1133          Vital Signs    O2 Delivery Pre Treatment room air  -PH     O2 Delivery Intra Treatment room air  -PH     O2 Delivery Post Treatment room air  -PH       Row Name 12/06/23 1133          Positioning and Restraints    Pre-Treatment Position sitting in chair/recliner  -PH     Post Treatment Position chair  -PH     In Chair sitting;call light within reach;encouraged to call for assist;exit alarm on;notified ns  -PH               User Key  (r) = Recorded By, (t) = Taken By, (c) = Cosigned By      Initials Name Provider Type     Vero Erwin PTA Physical Therapist Assistant                   Outcome Measures       Row Name 12/06/23 1135 12/06/23 0809       How much help from another person do you currently need...    Turning from your back to your side while in flat bed without using bedrails? 4  -PH 4  -LH    Moving from lying on back to sitting on the side of a flat bed without bedrails? 4  -PH 4  -LH    Moving to and from a bed to a chair (including a wheelchair)? 3  -PH 3  -LH     Standing up from a chair using your arms (e.g., wheelchair, bedside chair)? 3  -PH 3  -LH    Climbing 3-5 steps with a railing? 2  -PH 2  -    To walk in hospital room? 3  -PH 2  -LH    AM-PAC 6 Clicks Score (PT) 19  -PH 18  -    Highest Level of Mobility Goal 6 --> Walk 10 steps or more  -PH 6 --> Walk 10 steps or more  -      Row Name 12/06/23 1135          Functional Assessment    Outcome Measure Options AM-PAC 6 Clicks Basic Mobility (PT)  -               User Key  (r) = Recorded By, (t) = Taken By, (c) = Cosigned By      Initials Name Provider Type     Vero Erwin PTA Physical Therapist Assistant     Bria Felton, RN Registered Nurse                                 Physical Therapy Education       Title: PT OT SLP Therapies (In Progress)       Topic: Physical Therapy (Done)       Point: Mobility training (Done)       Learning Progress Summary             Patient Acceptance, E,TB,D, VU,DU by  at 12/6/2023 1136    Acceptance, E, DU,VU by FOUZIA at 12/5/2023 1345    Acceptance, E, VU,NR by  at 12/2/2023 1149    Acceptance, E,TB,D, VU,DU by PH at 11/30/2023 1359    Acceptance, E, DU by FOUZIA at 11/28/2023 1448    Acceptance, E,TB,D, VU,NR by  at 11/24/2023 1535    Acceptance, E, VU by SK at 11/21/2023 1609    Acceptance, E,TB,D, VU by SK at 11/20/2023 1237    Acceptance, E, NR by  at 11/19/2023 1424    Acceptance, E,TB,D, VU by SK at 11/17/2023 1633    Acceptance, E,D, NR by PC at 11/16/2023 1618    Acceptance, E,TB,D, VU by SK at 11/15/2023 1521    Acceptance, E, VU,NR by SK at 11/13/2023 1247    Acceptance, E,TB,D, VU,NR by  at 11/12/2023 1148   Family Acceptance, E, VU by SK at 11/21/2023 1609                         Point: Home exercise program (Done)       Learning Progress Summary             Patient Acceptance, WILY DUNHAM D, VU, DU by  at 12/6/2023 1136    Acceptance, CONY DUNHAM NR by CARMEN at 12/2/2023 1149    Acceptance, WILY DUNHAM D, VU, DU by  at 11/30/2023 1359    AcceptanceROLY DU by FOUZIA  at 11/28/2023 1448    Acceptance, E,TB,D, VU,NR by  at 11/24/2023 1535    Acceptance, E, VU by SK at 11/21/2023 1609    Acceptance, E,TB,D, VU by SK at 11/20/2023 1237    Acceptance, E, NR by  at 11/19/2023 1424    Acceptance, E,TB,D, VU by SK at 11/17/2023 1633    Acceptance, E,D, NR by  at 11/16/2023 1618    Acceptance, E,TB,D, VU by SK at 11/15/2023 1521    Acceptance, E,TB,D, VU,NR by  at 11/12/2023 1148   Family Acceptance, E, VU by SK at 11/21/2023 1609                         Point: Body mechanics (Done)       Learning Progress Summary             Patient Acceptance, E,TB,D, VU,DU by PH at 12/6/2023 1136    Acceptance, E, DU,VU by JDRAKE at 12/5/2023 1345    Acceptance, E, VU,NR by  at 12/2/2023 1149    Acceptance, E,TB,D, VU,DU by  at 11/30/2023 1359    Acceptance, E, DU by JDRAKE at 11/28/2023 1448    Acceptance, E,TB,D, VU,NR by  at 11/24/2023 1535    Acceptance, E, VU by SK at 11/21/2023 1609    Acceptance, E,TB,D, VU by SK at 11/20/2023 1237    Acceptance, E, NR by  at 11/19/2023 1424    Acceptance, E,TB,D, VU by SK at 11/17/2023 1633    Acceptance, E,D, NR by  at 11/16/2023 1618    Acceptance, E,TB,D, VU by SK at 11/15/2023 1521    Acceptance, E, VU,NR by SK at 11/13/2023 1247    Acceptance, E,TB,D, VU,NR by  at 11/12/2023 1148   Family Acceptance, E, VU by SK at 11/21/2023 1609                         Point: Precautions (Done)       Learning Progress Summary             Patient Acceptance, E,TB,D, VU,DU by PH at 12/6/2023 1136    Acceptance, E, DU,VU by JY at 12/5/2023 1345    Acceptance, E, VU,NR by CARMEN at 12/2/2023 1149    Acceptance, E,TB,D, VU,DU by PH at 11/30/2023 1359    Acceptance, E, DU by JY at 11/28/2023 1448    Acceptance, E, VU by SK at 11/21/2023 1609    Acceptance, E,TB,D, VU by SK at 11/20/2023 1237    Acceptance, E, NR by  at 11/19/2023 1424    Acceptance, E,TB,D, VU by SK at 11/17/2023 1633    Acceptance, E,D, NR by  at 11/16/2023 1618    Acceptance, E,TB,D, VU by SK at  11/15/2023 1521    Acceptance, E, VU,NR by SK at 11/13/2023 1247    Acceptance, E,TB,D, VU,NR by  at 11/12/2023 1148   Family Acceptance, E, VU by SK at 11/21/2023 1609                                         User Key       Initials Effective Dates Name Provider Type Discipline     06/16/21 -  Kim Almendarez, PT Physical Therapist PT    PC 06/16/21 -  Elba Whaley, PT Physical Therapist PT    EE 06/16/21 -  Shea Ceja, PT Physical Therapist PT    PH 06/16/21 -  Vero Erwin PTA Physical Therapist Assistant PT    DJ 10/25/19 -  Cyntiha Villalobos, PT Physical Therapist PT    AH 08/25/23 -  Nae Chaudhary, RN Registered Nurse Nurse    SK 10/10/23 -  Supriya Harris, PT Student PT Student PT    JY 11/08/23 -  Blake Garcia, PTA Student PTA Student PT                  PT Recommendation and Plan     Plan of Care Reviewed With: patient  Progress: improving  Outcome Evaluation: Pt was [facility] at beg of PT tx. Pt stood req CGA and fww. Pt then amb 180'+ - an improved distance - req CGA and use of fww. Pt was slow w/ no overt LOB. Activity intolerance is still limiting although pt is motivated and improving. Pt performed ther ex for strengthening and was [facility] at end of session. PT will prog as pt dony.     Time Calculation:         PT Charges       Row Name 12/06/23 1136             Time Calculation    Start Time 1000  -PH      Stop Time 1016  -PH      Time Calculation (min) 16 min  -PH      PT Received On 12/06/23  -PH      PT - Next Appointment 12/07/23  -PH         Timed Charges    45510 - PT Therapeutic Exercise Minutes 4  -PH      43390 - PT Therapeutic Activity Minutes 12  -PH         Total Minutes    Timed Charges Total Minutes 16  -PH       Total Minutes 16  -PH                User Key  (r) = Recorded By, (t) = Taken By, (c) = Cosigned By      Initials Name Provider Type    PH Vero Erwin, PTA Physical Therapist Assistant                  Therapy Charges for Today       Code Description  Service Date Service Provider Modifiers Qty    [id number]  PT THERAPEUTIC ACT EA 15 MIN 12/6/2023 Vero Erwin PTA GP 1            PT G-Codes  Outcome Measure Options: AM-PAC 6 Clicks Basic Mobility (PT)  AM-PAC 6 Clicks Score (PT): 19  AM-PAC 6 Clicks Score (OT): 17  Modified Linda Scale: 4 - Moderately severe disability.  Unable to walk without assistance, and unable to attend to own bodily needs without assistance.  PT Discharge Summary  Anticipated Discharge Disposition (PT): skilled nursing facility    Vero Erwin PTA  12/6/2023

## 2023-12-06 NOTE — SIGNIFICANT NOTE
12/06/23 1407   OTHER   Discipline occupational therapist   Rehab Time/Intention   Session Not Performed patient unavailable for treatment  (Pt reports being tired and requested OT come back in a hour. When OT returned pt was [facility] for HD. Pt will f/u next service date.)   Therapy Assessment/Plan (PT)   Criteria for Skilled Interventions Met (PT) yes;skilled treatment is necessary   Recommendation   OT - Next Appointment 12/07/23

## 2023-12-06 NOTE — PLAN OF CARE
Goal Outcome Evaluation:  Plan of Care Reviewed With: patient        Progress: improving  Outcome Evaluation: Pt was [facility] at beg of PT tx. Pt stood req CGA and fww. Pt then amb 180'+ - an improved distance - req CGA and use of fww. Pt was slow w/ no overt LOB. Activity intolerance is still limiting although pt is motivated and improving. Pt performed ther ex for strengthening and was [facility] at end of session. PT will prog as pt dony.      Anticipated Discharge Disposition (PT): skilled nursing facility

## 2023-12-06 NOTE — PROGRESS NOTES
Name: Dee Herrera ADMIT: 10/26/2023   : 1990  PCP: Margarita Woods APRN    MRN: [id number] LOS: 41 days   AGE/SEX: 33 y.o. female  ROOM: [facility]     Subjective   Subjective   No acute events overnight.  Patient sitting up in chair at bedside today and looks really good.  She is much more interactive today and asked questions on exam.  States that she is feeling tired but does not have any other complaints.  Looking forward to going to rehab soon.    Objective   Objective   Vital Signs  Temp:  [98.2 °F (36.8 °C)-98.4 °F (36.9 °C)] 98.2 °F (36.8 °C)  Heart Rate:  [79-83] 79  Resp:  [18] 18  BP: (132-158)/() 158/115  SpO2:  [100 %] 100 %  on   ;   Device (Oxygen Therapy): room air  Body mass index is 17.9 kg/m².  Physical Exam  General: Alert, no acute distress.  Ill-appearing.  Sitting up in chair at bedside today and very well-appearing.  Much more interactive today.  Asks and answers questions appropriately.  ENT: Left-sided Shiley catheter in place.  Neuro: Eyes open and moving in all directions, strength normal in all extremities, no focal deficits.   Lungs: Clear to auscultation bilaterally. No wheeze or crackles. No distress.   Heart: RRR, no murmurs. No edema.  Abdomen: Soft, non-tender, non-distended. Normal bowel sounds.   Ext: Warm and well-perfused. No edema.   Skin: Sternotomy scar healing well.  No surrounding erythema or drainage.    Results Review     I reviewed the patient's new clinical results.  Results from last 7 days   Lab Units 23  0612 23  0723  0515 12/03/23  0405   WBC 10*3/mm3 9.55 9.60  --  10.24 10.27   HEMOGLOBIN g/dL 7.9* 7.9* 8.0* 6.5* 7.5*   PLATELETS 10*3/mm3 155 164  --  182 209     Results from last 7 days   Lab Units 23  0612 23  0723  1043 23  0405   SODIUM mmol/L 129* 129* 127*  --  131*   POTASSIUM mmol/L 4.6 4.3 5.0 5.5* 5.1   CHLORIDE mmol/L 96* 95* 92*  --  98   CO2 mmol/L  21.0* 22.3 21.2*  --  21.8*   BUN mg/dL 33* 20 54*  --  41*   CREATININE mg/dL 5.37* 3.45* 6.37*  --  4.93*   GLUCOSE mg/dL 96 113* 81  --  84   EGFR mL/min/1.73 10.3* 17.5* 8.4*  --  11.4*     Results from last 7 days   Lab Units 12/06/23  0612 12/05/23  0707 12/04/23  0515 12/03/23  0405   ALBUMIN g/dL 2.3* 2.1* 2.2* 2.2*   BILIRUBIN mg/dL 0.3 0.5  --   --    ALK PHOS U/L 98 92  --   --    AST (SGOT) U/L 26 26  --   --    ALT (SGPT) U/L 9 9  --   --      Results from last 7 days   Lab Units 12/06/23  0612 12/05/23  0707 12/04/23  0515 12/03/23  1043 12/03/23  0405 12/02/23  0724 12/01/23  0643 11/30/23  0716   CALCIUM mg/dL 8.7 8.4* 8.7  --  8.7 8.8   < > 8.7   ALBUMIN g/dL 2.3* 2.1* 2.2*  --  2.2* 2.3*   < > 2.0*   MAGNESIUM mg/dL  --   --   --  2.0 1.9  --   --  1.9   PHOSPHORUS mg/dL  --  3.8 5.3*  --  4.8* 4.6*   < > 4.4    < > = values in this interval not displayed.       Glucose   Date/Time Value Ref Range Status   12/06/2023 1229 82 70 - 130 mg/dL Final   12/05/2023 1803 121 70 - 130 mg/dL Final   12/05/2023 1148 137 (H) 70 - 130 mg/dL Final   12/04/2023 0615 86 70 - 130 mg/dL Final   12/03/2023 2045 116 70 - 130 mg/dL Final   12/03/2023 1804 134 (H) 70 - 130 mg/dL Final       No radiology results for the last day    I have personally reviewed all medications:  Scheduled Medications  [START ON 12/7/2023] amLODIPine, 10 mg, Oral, Q24H  apixaban, 5 mg, Oral, Q12H  aspirin, 81 mg, Nasogastric, Daily  carvedilol, 25 mg, Nasogastric, Q12H  chlorhexidine, 15 mL, Mouth/Throat, Q12H  escitalopram, 10 mg, Nasogastric, Daily  First Mouthwash (Magic Mouthwash), 10 mL, Swish & Spit, Q6H  hydroxychloroquine, 200 mg, Oral, Daily  Lacosamide, 100 mg, Intravenous, Q12H  lactulose, 10 g, Nasogastric, TID  lidocaine, 10 mL, Subcutaneous, Once  mupirocin, , Each Nare, BID  mycophenolate, 250 mg, Oral, Q12H  ondansetron, 4 mg, Oral, TID AC  pantoprazole, 40 mg, Oral, QAM AC    Infusions     Diet  Diet: Regular/House Diet;  Texture: Regular Texture (IDDSI 7); Fluid Consistency: Thin (IDDSI 0)    Assessment/Plan     Active Hospital Problems    Diagnosis  POA    **Dissecting ascending aortic aneurysm [I71.010]  Yes    Internal jugular (IJ) vein thromboembolism, chronic [I82.C29]  Unknown    Recent cerebrovascular accident (CVA) [Z86.73]  Yes    Aortic valve lesion [I35.8]  Yes    Severe aortic valve regurgitation [I35.1]  Yes    Hyponatremia [E87.1]  Yes    Moderate malnutrition [E44.0]  Yes    Chronic diastolic CHF (congestive heart failure) [I50.32]  Yes    Anemia due to chronic kidney disease, on chronic dialysis [N18.6, D63.1, Z99.2]  Not Applicable    Essential hypertension [I10]  Yes    End stage renal disease on dialysis [N18.6, Z99.2]  Not Applicable    Seizure disorder [G40.909]  Yes    Elevated liver function tests [R79.89]  Yes    Systemic lupus erythematosus [M32.9]  Yes    Thrombocytopenia [D69.6]  Yes      Resolved Hospital Problems    Diagnosis Date Resolved POA    Abdominal pain [R10.9] 10/28/2023 Yes       33 y.o. female admitted with Dissecting ascending aortic aneurysm.    Ascending aortic aneurysm with dissection  Cardiac tamponade  Severe aortic valve insufficiency  -11/2/2023 for dissection repair and replacement, 11/6/2023 for clot removal compressing RV with cardiac tamponade  -Most recent echocardiogram EF 65%, trivial pericardial effusion  -CT surgery continues to follow    Anemia  -Hgb improved today, s/p 1 unit PRBC 12/4  -No signs of active bleeding, Hgb has been stable for couple days  -Monitor with daily CBC, transfuse for Hgb less than 7  -Fecal occult pending  -Restart Eliquis today and closely monitor hemoglobin    Right IJ DVT, chronic  -Found during placement of tunneled dialysis catheter  -Restarting Eliquis today as above    HTN  -Continue Coreg 25 mg every 12 hours, increase amlodipine to 10 mg daily  -Diastolic BP still running high; cardiology would like to add ACE or ARB if okay with  nephrology  -Continue to closely monitor, titrate medications as needed    ESRD  Hyponatremia, hyperkalemia  -Secondary to lupus nephritis, on peritoneal dialysis at home but patient has declined wanting to do PD anymore and has been agreeable to HD  -Sodium low but stable at 129 today, potassium 4.6  -Nephrology consulted and following  -PD catheter now removed  -Continue HD via [facility], nephrology managing  -Fluid restriction to 1000 cc per 24 hours  -Monitor very closely with daily BMP    Lupus  -Continue CellCept and Plaquenil    History of CVA  -Right MCA stroke on admission, confirmed by MRI  -Continue ASA per neurology    Depression  -Continue Lexapro  -Access and psych Been following    Seizure disorder  -Continue lacosamide      Etta Rodriguez MD  [facility]  12/06/23  14:10 EST

## 2023-12-06 NOTE — PROGRESS NOTES
HOSPITAL FOLLOW UP NOTE    Patient Name: Dee Herrera  Patient : 1990        Date of Service:23  Provider of Service: CAESAR Thomas  Place of Service: [facility]  Referral Provider: No ref. provider found          Follow Up: Ascending aortic aneurysm and dissection    Interval Hx: Diastolic BP high, HD planned for today, no pain, feels like her legs are numb and would like a massage, sitting in recliner    OBJECTIVE  Temp:  [98.2 °F (36.8 °C)-98.6 °F (37 °C)] 98.2 °F (36.8 °C)  Heart Rate:  [79-83] 79  Resp:  [18] 18  BP: (127-158)/() 158/115     Intake/Output Summary (Last 24 hours) at 2023 0844  Last data filed at 2023 0100  Gross per 24 hour   Intake 778 ml   Output --   Net 778 ml     Body mass index is 17.9 kg/m².      23  0735 23  0500 23  0500   Weight: 50.3 kg (111 lb) 49.2 kg (108 lb 7.5 oz) 47.3 kg (104 lb 4.8 oz)         Physical Exam:     General Appearance:    Alert, cooperative, in no acute distress   Head:    Normocephalic, without obvious abnormality, atraumatic   Eyes:            Conjunctivae and sclerae normal, no   icterus, no pallor, corneas clear, PERRLA   Neck:   No adenopathy, supple, trachea midline, no thyromegaly, no   carotid bruit, no JVD   Lungs:     Clear to auscultation,respirations regular, even and unlabored    Heart:    Regular rhythm and normal rate, normal S1 and S2, no murmur, no gallop, no rub, no click   Chest Wall:    No abnormalities observed   Abdomen:     Normal bowel sounds, no masses, no organomegaly, soft, nontender, nondistended, no guarding, no rebound  tenderness   Extremities:   Moves all extremities well, no edema, no cyanosis, no redness   Pulses:   Pulses palpable and equal bilaterally.          CURRENT MEDS    Scheduled Meds:amLODIPine, 5 mg, Oral, Q24H  aspirin, 81 mg, Nasogastric, Daily  carvedilol, 25 mg, Nasogastric, Q12H  chlorhexidine, 15 mL, Mouth/Throat, Q12H  escitalopram, 10  mg, Nasogastric, Daily  First Mouthwash (Magic Mouthwash), 10 mL, Swish & Spit, Q6H  hydroxychloroquine, 200 mg, Oral, Daily  Lacosamide, 100 mg, Intravenous, Q12H  lactulose, 10 g, Nasogastric, TID  lidocaine, 10 mL, Subcutaneous, Once  mupirocin, , Each Nare, BID  mycophenolate, 250 mg, Oral, Q12H  ondansetron, 4 mg, Oral, TID AC  pantoprazole, 40 mg, Oral, QAM AC      Continuous Infusions:       Lab Review:   Results from last 7 days   Lab Units 12/06/23  0612 12/05/23  0707   SODIUM mmol/L 129* 129*   POTASSIUM mmol/L 4.6 4.3   CHLORIDE mmol/L 96* 95*   CO2 mmol/L 21.0* 22.3   BUN mg/dL 33* 20   CREATININE mg/dL 5.37* 3.45*   GLUCOSE mg/dL 96 113*   CALCIUM mg/dL 8.7 8.4*   AST (SGOT) U/L 26 26   ALT (SGPT) U/L 9 9         Results from last 7 days   Lab Units 12/06/23  0612 12/05/23  0707   WBC 10*3/mm3 9.55 9.60   HEMOGLOBIN g/dL 7.9* 7.9*   HEMATOCRIT % 23.9* 24.7*   PLATELETS 10*3/mm3 155 164         Results from last 7 days   Lab Units 12/03/23  1043 12/03/23  0405   MAGNESIUM mg/dL 2.0 1.9                       ASSESSMENT & PLAN    Dissecting ascending aortic aneurysm    Thrombocytopenia    Systemic lupus erythematosus    End stage renal disease on dialysis    Seizure disorder    Elevated liver function tests    Essential hypertension    Anemia due to chronic kidney disease, on chronic dialysis    Hyponatremia    Moderate malnutrition    Chronic diastolic CHF (congestive heart failure)    Aortic valve lesion    Severe aortic valve regurgitation    Recent cerebrovascular accident (CVA)    Internal jugular (IJ) vein thromboembolism, chronic      1.  Ascending aortic aneurysm with subacute/chronic aortic root dissection: Status post aneurysm repair and replacement on 11/2/2023  2.  Severe aortic regurgitation: Secondary to #1.  Status post repair on 11/2/2023  3.  Cardiac tamponade: Secondary to pericardial thrombus anteriorly and compressing right ventricle.  Requiring reexploration with clot removal and  treatment of a small amount of bleeding at the suture line on 11/6/2023  4.  Seizures: Started postoperatively  5.  ESRD: Secondary to lupus nephritis.  On peritoneal dialysis at home.  On hemodialysis while in hospital.  2.4 L removed yesterday  6.  Hyponatremia: Persistent  7.  Hypertension: Diastolic running high.  On carvedilol 25 mg twice daily and amlodipine.   Add ACE or ARB if okay with nephrology.  8.  Chronic anemia: Hb appears stable over the last 24 hrs  9.  Systemic lupus erythematosus: Hydroxychloroquine and mycophenolate resumed  10.  Right MCA CVA: Confirmed by MRI.  Neurology expects full recovery  11.  Right IJ DVT: Subacute    Diastolic readings remain high.  Add ACE or ARB if okay with nephrology.  May benefit from some SCDs as she is feeling that her legs are little numb.      Nilda Oseguera, APRN  12/06/23

## 2023-12-06 NOTE — PROGRESS NOTES
[facility] Progress Note      Patient Name: Dee Herrera  : 1990  MRN: [id number]  Primary Care Physician:  Margarita Woods APRN  Date of admission: 10/26/2023    Subjective     Interval History:   Follow-up ESRD.  On dialysis.   increased to 400.  More interactive when awakened.  Review of Systems:   As noted above    Objective     Vitals:   Temp:  [98.2 °F (36.8 °C)-98.4 °F (36.9 °C)] 98.2 °F (36.8 °C)  Heart Rate:  [79-83] 79  Resp:  [18] 18  BP: (132-158)/() 158/115    Intake/Output Summary (Last 24 hours) at 2023 1544  Last data filed at 2023 0917  Gross per 24 hour   Intake 690 ml   Output --   Net 690 ml       Physical Exam:    General Appearance: On dialysis.  Awakens easily.    Skin: warm and dry  HEENT: oral mucosa dry.   Nonicteric sclera  Neck: Left IJ tunneled dialysis catheter.  Lungs: clear to auscultation bilaterally.  Heart: RRR, normal S1 and S2  Abdomen: soft, nontender, nondistended.  +bs  Extremities: no edema.      Scheduled Meds:     [START ON 2023] amLODIPine, 10 mg, Oral, Q24H  apixaban, 5 mg, Oral, Q12H  aspirin, 81 mg, Nasogastric, Daily  carvedilol, 25 mg, Nasogastric, Q12H  chlorhexidine, 15 mL, Mouth/Throat, Q12H  escitalopram, 10 mg, Nasogastric, Daily  First Mouthwash (Magic Mouthwash), 10 mL, Swish & Spit, Q6H  hydroxychloroquine, 200 mg, Oral, Daily  Lacosamide, 100 mg, Intravenous, Q12H  lactulose, 10 g, Nasogastric, TID  lidocaine, 10 mL, Subcutaneous, Once  mupirocin, , Each Nare, BID  mycophenolate, 250 mg, Oral, Q12H  ondansetron, 4 mg, Oral, TID AC  pantoprazole, 40 mg, Oral, QAM AC      IV Meds:          Results Reviewed:   I have personally reviewed the results from the time of this admission to 2023 15:44 EST     Results from last 7 days   Lab Units 23  0612 23  0707 23  0515   SODIUM mmol/L 129* 129* 127*   POTASSIUM mmol/L 4.6 4.3 5.0   CHLORIDE mmol/L 96* 95* 92*   CO2 mmol/L  21.0* 22.3 21.2*   BUN mg/dL 33* 20 54*   CREATININE mg/dL 5.37* 3.45* 6.37*   CALCIUM mg/dL 8.7 8.4* 8.7   BILIRUBIN mg/dL 0.3 0.5  --    ALK PHOS U/L 98 92  --    ALT (SGPT) U/L 9 9  --    AST (SGOT) U/L 26 26  --    GLUCOSE mg/dL 96 113* 81       Estimated Creatinine Clearance: 11.3 mL/min (A) (by C-G formula based on SCr of 5.37 mg/dL (H)).    Results from last 7 days   Lab Units 12/05/23  0707 12/04/23  0515 12/03/23  1043 12/03/23  0405 12/01/23  0643 11/30/23  0716   MAGNESIUM mg/dL  --   --  2.0 1.9  --  1.9   PHOSPHORUS mg/dL 3.8 5.3*  --  4.8*   < > 4.4    < > = values in this interval not displayed.             Results from last 7 days   Lab Units 12/06/23  0612 12/05/23  0707 12/04/23  2005 12/04/23  0515 12/03/23  0405 12/02/23  0724   WBC 10*3/mm3 9.55 9.60  --  10.24 10.27 8.97   HEMOGLOBIN g/dL 7.9* 7.9* 8.0* 6.5* 7.5* 8.3*   PLATELETS 10*3/mm3 155 164  --  182 209 238             Assessment / Plan     ASSESSMENT:  ESRD.  Now on hemodialysis.  Overall clinically looks better.  Dialysis today.  2.  Acute CVA cortical infarct in the anterior inferior medial right frontal lobe.  Subacute subdural hematoma right occipital.  3.  Ascending aortic aneurysm with subacute/chronic aortic root dissection.  Status post repair of proximal aortic aneurysm 11/2/2023.  Re- exploration for cardiac tamponade 11/6/2023.  4.  Seizure disorder  on Vimpat  5.  Severe protein calorie malnutrition.   6.  SLE.  Currently off immunosuppression.  C3 mildly low, C4 normal.  Anti-double-stranded DNA antibody elevated 11/20/2023.  Plaquenil and CellCept .  7.  Anemia CKD and blood loss.  Hemoglobin low but stable at 7.9.  Received a unit of blood 12 4 and overall stable since then.  PLAN:  Hemodialysis today.  Thank you for involving us in the care of Dee Esparzaes.  Please feel free to call with any questions.    Regi Hyde MD  12/06/23  15:44 EST    [facility]  [phone]    Please note  that portions of this note were completed with a voice recognition program.

## 2023-12-07 ENCOUNTER — APPOINTMENT (OUTPATIENT)
Dept: GENERAL RADIOLOGY | Facility: HOSPITAL | Age: 33
DRG: 219 | End: 2023-12-07
Payer: MEDICARE

## 2023-12-07 ENCOUNTER — APPOINTMENT (OUTPATIENT)
Dept: CARDIOLOGY | Facility: HOSPITAL | Age: 33
DRG: 219 | End: 2023-12-07
Payer: MEDICARE

## 2023-12-07 LAB
ALBUMIN SERPL-MCNC: 2.3 G/DL (ref 3.5–5.2)
ALBUMIN/GLOB SERPL: 0.8 G/DL
ALP SERPL-CCNC: 79 U/L (ref 39–117)
ALT SERPL W P-5'-P-CCNC: 9 U/L (ref 1–33)
ANION GAP SERPL CALCULATED.3IONS-SCNC: 9 MMOL/L (ref 5–15)
AST SERPL-CCNC: 21 U/L (ref 1–32)
BH CV ECHO MEAS - AI P1/2T: 579.6 MSEC
BH CV ECHO MEAS - AO MAX PG: 19.2 MMHG
BH CV ECHO MEAS - AO MEAN PG: 10.7 MMHG
BH CV ECHO MEAS - AO V2 MAX: 219.4 CM/SEC
BH CV ECHO MEAS - AO V2 VTI: 39.4 CM
BH CV ECHO MEAS - EDV(CUBED): 52.3 ML
BH CV ECHO MEAS - EDV(MOD-SP2): 109 ML
BH CV ECHO MEAS - EDV(MOD-SP4): 72 ML
BH CV ECHO MEAS - EF(MOD-BP): 64.4 %
BH CV ECHO MEAS - EF(MOD-SP2): 65.1 %
BH CV ECHO MEAS - EF(MOD-SP4): 65.3 %
BH CV ECHO MEAS - ESV(CUBED): 12.5 ML
BH CV ECHO MEAS - ESV(MOD-SP2): 38 ML
BH CV ECHO MEAS - ESV(MOD-SP4): 25 ML
BH CV ECHO MEAS - FS: 37.9 %
BH CV ECHO MEAS - IVS/LVPW: 1.1 CM
BH CV ECHO MEAS - IVSD: 1.83 CM
BH CV ECHO MEAS - LV MASS(C)D: 270.5 GRAMS
BH CV ECHO MEAS - LV MAX PG: 6.8 MMHG
BH CV ECHO MEAS - LV MEAN PG: 3.7 MMHG
BH CV ECHO MEAS - LV V1 MAX: 130.8 CM/SEC
BH CV ECHO MEAS - LV V1 VTI: 20.6 CM
BH CV ECHO MEAS - LVIDD: 3.7 CM
BH CV ECHO MEAS - LVIDS: 2.32 CM
BH CV ECHO MEAS - LVPWD: 1.66 CM
BH CV ECHO MEAS - SV(MOD-SP2): 71 ML
BH CV ECHO MEAS - SV(MOD-SP4): 47 ML
BILIRUB SERPL-MCNC: 0.3 MG/DL (ref 0–1.2)
BUN SERPL-MCNC: 15 MG/DL (ref 6–20)
BUN/CREAT SERPL: 4.4 (ref 7–25)
CALCIUM SPEC-SCNC: 8.4 MG/DL (ref 8.6–10.5)
CHLORIDE SERPL-SCNC: 96 MMOL/L (ref 98–107)
CO2 SERPL-SCNC: 22 MMOL/L (ref 22–29)
CREAT SERPL-MCNC: 3.39 MG/DL (ref 0.57–1)
DEPRECATED RDW RBC AUTO: 45.5 FL (ref 37–54)
EGFRCR SERPLBLD CKD-EPI 2021: 17.9 ML/MIN/1.73
ERYTHROCYTE [DISTWIDTH] IN BLOOD BY AUTOMATED COUNT: 14.5 % (ref 12.3–15.4)
GEN 5 2HR TROPONIN T REFLEX: 140 NG/L
GLOBULIN UR ELPH-MCNC: 3 GM/DL
GLUCOSE SERPL-MCNC: 99 MG/DL (ref 65–99)
HCT VFR BLD AUTO: 20.6 % (ref 34–46.6)
HCT VFR BLD AUTO: 27.9 % (ref 34–46.6)
HGB BLD-MCNC: 6.9 G/DL (ref 12–15.9)
HGB BLD-MCNC: 9.3 G/DL (ref 12–15.9)
MAGNESIUM SERPL-MCNC: 1.7 MG/DL (ref 1.6–2.6)
MCH RBC QN AUTO: 28.5 PG (ref 26.6–33)
MCHC RBC AUTO-ENTMCNC: 33.5 G/DL (ref 31.5–35.7)
MCV RBC AUTO: 85.1 FL (ref 79–97)
PLATELET # BLD AUTO: 145 10*3/MM3 (ref 140–450)
PMV BLD AUTO: 10.5 FL (ref 6–12)
POTASSIUM SERPL-SCNC: 4.2 MMOL/L (ref 3.5–5.2)
PROT SERPL-MCNC: 5.3 G/DL (ref 6–8.5)
QT INTERVAL: 376 MS
QTC INTERVAL: 439 MS
RBC # BLD AUTO: 2.42 10*6/MM3 (ref 3.77–5.28)
SODIUM SERPL-SCNC: 127 MMOL/L (ref 136–145)
TROPONIN T DELTA: -5 NG/L
TROPONIN T SERPL HS-MCNC: 145 NG/L
WBC NRBC COR # BLD AUTO: 7.98 10*3/MM3 (ref 3.4–10.8)

## 2023-12-07 PROCEDURE — P9016 RBC LEUKOCYTES REDUCED: HCPCS

## 2023-12-07 PROCEDURE — 86900 BLOOD TYPING SEROLOGIC ABO: CPT

## 2023-12-07 PROCEDURE — 63710000001 MYCOPHENOLATE MOFETIL PER 250 MG: Performed by: SURGERY

## 2023-12-07 PROCEDURE — 93308 TTE F-UP OR LMTD: CPT | Performed by: INTERNAL MEDICINE

## 2023-12-07 PROCEDURE — 85018 HEMOGLOBIN: CPT | Performed by: STUDENT IN AN ORGANIZED HEALTH CARE EDUCATION/TRAINING PROGRAM

## 2023-12-07 PROCEDURE — 93308 TTE F-UP OR LMTD: CPT

## 2023-12-07 PROCEDURE — 84484 ASSAY OF TROPONIN QUANT: CPT | Performed by: STUDENT IN AN ORGANIZED HEALTH CARE EDUCATION/TRAINING PROGRAM

## 2023-12-07 PROCEDURE — 99232 SBSQ HOSP IP/OBS MODERATE 35: CPT | Performed by: NURSE PRACTITIONER

## 2023-12-07 PROCEDURE — 36430 TRANSFUSION BLD/BLD COMPNT: CPT

## 2023-12-07 PROCEDURE — 99024 POSTOP FOLLOW-UP VISIT: CPT | Performed by: THORACIC SURGERY (CARDIOTHORACIC VASCULAR SURGERY)

## 2023-12-07 PROCEDURE — 93325 DOPPLER ECHO COLOR FLOW MAPG: CPT

## 2023-12-07 PROCEDURE — 85014 HEMATOCRIT: CPT | Performed by: STUDENT IN AN ORGANIZED HEALTH CARE EDUCATION/TRAINING PROGRAM

## 2023-12-07 PROCEDURE — 80053 COMPREHEN METABOLIC PANEL: CPT | Performed by: STUDENT IN AN ORGANIZED HEALTH CARE EDUCATION/TRAINING PROGRAM

## 2023-12-07 PROCEDURE — 93005 ELECTROCARDIOGRAM TRACING: CPT | Performed by: STUDENT IN AN ORGANIZED HEALTH CARE EDUCATION/TRAINING PROGRAM

## 2023-12-07 PROCEDURE — 93321 DOPPLER ECHO F-UP/LMTD STD: CPT

## 2023-12-07 PROCEDURE — 93010 ELECTROCARDIOGRAM REPORT: CPT | Performed by: INTERNAL MEDICINE

## 2023-12-07 PROCEDURE — C9254 INJECTION, LACOSAMIDE: HCPCS | Performed by: SURGERY

## 2023-12-07 PROCEDURE — 93325 DOPPLER ECHO COLOR FLOW MAPG: CPT | Performed by: INTERNAL MEDICINE

## 2023-12-07 PROCEDURE — 71045 X-RAY EXAM CHEST 1 VIEW: CPT

## 2023-12-07 PROCEDURE — 85027 COMPLETE CBC AUTOMATED: CPT | Performed by: STUDENT IN AN ORGANIZED HEALTH CARE EDUCATION/TRAINING PROGRAM

## 2023-12-07 PROCEDURE — 83735 ASSAY OF MAGNESIUM: CPT | Performed by: STUDENT IN AN ORGANIZED HEALTH CARE EDUCATION/TRAINING PROGRAM

## 2023-12-07 PROCEDURE — 94799 UNLISTED PULMONARY SVC/PX: CPT

## 2023-12-07 PROCEDURE — 63710000001 ONDANSETRON PER 8 MG: Performed by: SURGERY

## 2023-12-07 PROCEDURE — 93321 DOPPLER ECHO F-UP/LMTD STD: CPT | Performed by: INTERNAL MEDICINE

## 2023-12-07 PROCEDURE — 25010000002 LACOSAMIDE 200 MG/20ML SOLUTION: Performed by: SURGERY

## 2023-12-07 RX ORDER — LISINOPRIL 10 MG/1
10 TABLET ORAL
Status: DISCONTINUED | OUTPATIENT
Start: 2023-12-07 | End: 2023-12-09 | Stop reason: HOSPADM

## 2023-12-07 RX ADMIN — PANTOPRAZOLE SODIUM 40 MG: 40 TABLET, DELAYED RELEASE ORAL at 06:11

## 2023-12-07 RX ADMIN — ACETAMINOPHEN 650 MG: 325 TABLET, FILM COATED ORAL at 21:41

## 2023-12-07 RX ADMIN — ASPIRIN 81 MG: 81 TABLET, CHEWABLE ORAL at 09:26

## 2023-12-07 RX ADMIN — NITROGLYCERIN 0.4 MG: 0.4 TABLET SUBLINGUAL at 08:27

## 2023-12-07 RX ADMIN — LACOSAMIDE 100 MG: 10 INJECTION INTRAVENOUS at 10:59

## 2023-12-07 RX ADMIN — LACOSAMIDE 100 MG: 10 INJECTION INTRAVENOUS at 21:25

## 2023-12-07 RX ADMIN — HYDROCODONE BITARTRATE AND ACETAMINOPHEN 1 TABLET: 7.5; 325 TABLET ORAL at 09:26

## 2023-12-07 RX ADMIN — HYDROXYCHLOROQUINE SULFATE 200 MG: 200 TABLET ORAL at 09:30

## 2023-12-07 RX ADMIN — ONDANSETRON HYDROCHLORIDE 4 MG: 4 TABLET, FILM COATED ORAL at 06:11

## 2023-12-07 RX ADMIN — CARVEDILOL 25 MG: 25 TABLET, FILM COATED ORAL at 09:26

## 2023-12-07 RX ADMIN — ONDANSETRON HYDROCHLORIDE 4 MG: 4 TABLET, FILM COATED ORAL at 10:59

## 2023-12-07 RX ADMIN — IPRATROPIUM BROMIDE AND ALBUTEROL SULFATE 3 ML: 2.5; .5 SOLUTION RESPIRATORY (INHALATION) at 01:37

## 2023-12-07 RX ADMIN — MYCOPHENOLATE MOFETIL 250 MG: 500 TABLET, FILM COATED ORAL at 10:21

## 2023-12-07 RX ADMIN — LISINOPRIL 10 MG: 10 TABLET ORAL at 09:26

## 2023-12-07 RX ADMIN — ESCITALOPRAM OXALATE 10 MG: 10 TABLET, FILM COATED ORAL at 09:25

## 2023-12-07 RX ADMIN — AMLODIPINE BESYLATE 10 MG: 10 TABLET ORAL at 09:26

## 2023-12-07 RX ADMIN — CARVEDILOL 25 MG: 25 TABLET, FILM COATED ORAL at 21:25

## 2023-12-07 NOTE — PROGRESS NOTES
Name: Dee Herrera ADMIT: 10/26/2023   : 1990  PCP: Margarita Woods APRN    MRN: [id number] LOS: 42 days   AGE/SEX: 33 y.o. female  ROOM: [facility]     Subjective   Subjective   No acute events overnight.  Unfortunately the patient's hemoglobin has dropped below 7 again so we are holding her Eliquis and transfusing 1 unit PRBC.  She also endorsed an episode of chest pain this morning.  At time of my evaluation she reports continued chest pressure.  I did make cardiothoracic surgery aware.    Objective   Objective   Vital Signs  Temp:  [98.1 °F (36.7 °C)-99 °F (37.2 °C)] 98.6 °F (37 °C)  Heart Rate:  [73-92] 73  Resp:  [16-18] 16  BP: (121-149)/() 127/93  SpO2:  [100 %] 100 %  on   ;   Device (Oxygen Therapy): room air  Body mass index is 20.6 kg/m².  Physical Exam  General: Alert, no acute distress.  Ill-appearing.  Lying in bed today.  Continues to be more interactive.  Asks and answers questions appropriately.  ENT: Left-sided Shiley catheter in place.  Neuro: Eyes open and moving in all directions, strength normal in all extremities, no focal deficits.   Lungs: Clear to auscultation bilaterally. No wheeze or crackles. No distress.   Heart: RRR, no murmurs. No edema.  Abdomen: Soft, non-tender, non-distended. Normal bowel sounds.   Ext: Warm and well-perfused. No edema.   Skin: Sternotomy scar healing well.  No surrounding erythema or drainage.    Results Review     I reviewed the patient's new clinical results.  Results from last 7 days   Lab Units 23  0651 23  0623   WBC 10*3/mm3 7.98 9.55 9.60  --  10.24   HEMOGLOBIN g/dL 6.9* 7.9* 7.9* 8.0* 6.5*   PLATELETS 10*3/mm3 145 155 164  --  182     Results from last 7 days   Lab Units 23  0651 23  0623  0515   SODIUM mmol/L 127* 129* 129* 127*   POTASSIUM mmol/L 4.2 4.6 4.3 5.0   CHLORIDE mmol/L 96* 96* 95* 92*   CO2 mmol/L 22.0 21.0* 22.3 21.2*   BUN  mg/dL 15 33* 20 54*   CREATININE mg/dL 3.39* 5.37* 3.45* 6.37*   GLUCOSE mg/dL 99 96 113* 81   EGFR mL/min/1.73 17.9* 10.3* 17.5* 8.4*     Results from last 7 days   Lab Units 12/07/23  0651 12/06/23  0612 12/05/23  0707 12/04/23  0515   ALBUMIN g/dL 2.3* 2.3* 2.1* 2.2*   BILIRUBIN mg/dL 0.3 0.3 0.5  --    ALK PHOS U/L 79 98 92  --    AST (SGOT) U/L 21 26 26  --    ALT (SGPT) U/L 9 9 9  --      Results from last 7 days   Lab Units 12/07/23  0651 12/06/23  0612 12/05/23  0707 12/04/23  0515 12/03/23  1043 12/03/23  0405 12/02/23  0724   CALCIUM mg/dL 8.4* 8.7 8.4* 8.7  --  8.7 8.8   ALBUMIN g/dL 2.3* 2.3* 2.1* 2.2*  --  2.2* 2.3*   MAGNESIUM mg/dL 1.7  --   --   --  2.0 1.9  --    PHOSPHORUS mg/dL  --   --  3.8 5.3*  --  4.8* 4.6*       Glucose   Date/Time Value Ref Range Status   12/06/2023 1229 82 70 - 130 mg/dL Final   12/05/2023 1803 121 70 - 130 mg/dL Final   12/05/2023 1148 137 (H) 70 - 130 mg/dL Final       XR Chest 1 View    Result Date: 12/7/2023  Central line placement. No pneumothorax. Cardiomegaly.  This report was finalized on 12/7/2023 1:20 PM by Dr. Norm Arshad M.D on Workstation: [id number]       I have personally reviewed all medications:  Scheduled Medications  amLODIPine, 10 mg, Oral, Q24H  aspirin, 81 mg, Nasogastric, Daily  carvedilol, 25 mg, Nasogastric, Q12H  escitalopram, 10 mg, Nasogastric, Daily  hydroxychloroquine, 200 mg, Oral, Daily  Lacosamide, 100 mg, Intravenous, Q12H  lactulose, 10 g, Nasogastric, TID  lidocaine, 10 mL, Subcutaneous, Once  lisinopril, 10 mg, Oral, Q24H  mupirocin, , Each Nare, BID  mycophenolate, 250 mg, Oral, Q12H  ondansetron, 4 mg, Oral, TID AC  pantoprazole, 40 mg, Oral, QAM AC    Infusions     Diet  Diet: Regular/House Diet; Texture: Regular Texture (IDDSI 7); Fluid Consistency: Thin (IDDSI 0)    Assessment/Plan     Active Hospital Problems    Diagnosis  POA    **Dissecting ascending aortic aneurysm [I71.010]  Yes    Internal jugular (IJ) vein  thromboembolism, chronic [I82.C29]  Unknown    Recent cerebrovascular accident (CVA) [Z86.73]  Yes    Aortic valve lesion [I35.8]  Yes    Severe aortic valve regurgitation [I35.1]  Yes    Hyponatremia [E87.1]  Yes    Moderate malnutrition [E44.0]  Yes    Chronic diastolic CHF (congestive heart failure) [I50.32]  Yes    Anemia due to chronic kidney disease, on chronic dialysis [N18.6, D63.1, Z99.2]  Not Applicable    Essential hypertension [I10]  Yes    End stage renal disease on dialysis [N18.6, Z99.2]  Not Applicable    Seizure disorder [G40.909]  Yes    Elevated liver function tests [R79.89]  Yes    Systemic lupus erythematosus [M32.9]  Yes    Thrombocytopenia [D69.6]  Yes      Resolved Hospital Problems    Diagnosis Date Resolved POA    Abdominal pain [R10.9] 10/28/2023 Yes       33 y.o. female admitted with Dissecting ascending aortic aneurysm.    Ascending aortic aneurysm with dissection  Cardiac tamponade  Severe aortic valve insufficiency  -11/2/2023 for dissection repair and replacement, 11/6/2023 for clot removal compressing RV with cardiac tamponade  -Most recent echocardiogram EF 65%, trivial pericardial effusion  -CT surgery continues to follow  -Repeat echo today given low hemoglobin and chest pain, pending at this time; CT surgery to follow-up  -Troponin 145 this morning which is actually downtrending, repeat 140  -Cardiology has been following, signed off today    Anemia  -Hgb 6.9 today, s/p 1 unit PRBC 12/4 and again 12/7  -No signs of active bleeding, Hgb has been stable for couple days  -Monitor with daily CBC, transfuse for Hgb less than 7  -Fecal occult pending  -Eliquis was restarted yesterday, but unfortunately we are going to have to hold it again.      Right IJ DVT, chronic  -Found during placement of tunneled dialysis catheter  -Eliquis started yesterday with drop in hemoglobin, hold for now  -Discussed with vascular surgery today, they would like to hold Eliquis and trend Hgb at this  time    HTN  -Continue Coreg 25 mg every 12 hours, continue amlodipine to 10 mg daily  -Diastolic BP still running high; cardiology would like to add ACE or ARB if okay with nephrology  -Continue to closely monitor, titrate medications as needed    ESRD  Hyponatremia, hyperkalemia  -Secondary to lupus nephritis, on peritoneal dialysis at home but patient has declined wanting to do PD anymore and has been agreeable to HD  -Sodium 127 today, potassium 4.2  -Nephrology consulted and following  -PD catheter now removed  -Continue HD via [facility], nephrology managing  -Fluid restriction to 1000 cc per 24 hours  -Monitor very closely with daily BMP    Lupus  -Continue CellCept and Plaquenil    History of CVA  -Right MCA stroke on admission, confirmed by MRI  -Continue ASA per neurology    Depression  -Continue Lexapro  -Access and psych Been following    Seizure disorder  -Continue lacosamide      Etta Rodriguez MD  [facility]  12/07/23  15:06 EST

## 2023-12-07 NOTE — PLAN OF CARE
Goal Outcome Evaluation:  Plan of Care Reviewed With: patient        Progress: no change  Outcome Evaluation: Patient w/ c/o cp this am. Troponin elevated. HGB 6.8, transfused 1 unit prbc, evaluated by CT surgery and cardiology. one episode of emesis after morning meds. no whole pills visualized. vss with intermittently elevated blood pressures.

## 2023-12-07 NOTE — PROGRESS NOTES
Nutrition Services    Patient Name:  Dee Herrera  YOB: 1990  MRN: [id number]  Admit Date:  10/26/2023    Assessment Date:  12/07/23    CLINICAL NUTRITION - PROGRESS NOTE       Reason for Encounter Follow-up         Nutrition Order Diet: Regular/House Diet; Texture: Regular Texture (IDDSI 7); Fluid Consistency: Thin (IDDSI 0)    Supplements/Snacks Magic Cup BID    EN/PN Order N/a         Pertinent Information 25% po intake x 3 meals, 75% magic cups.  HD 12/6 with 2.5L removed.  Refused PEG per chart review.  Magic Cups in place BID.  Pt likes Magic cups. Encouraged increased po intake of all meals/ONS.         Intervention/Plan RD to continue to follow.     RD to follow up per protocol.    Electronically signed by:  Crystal Edwards RD  12/07/23 17:24 EST

## 2023-12-07 NOTE — PROGRESS NOTES
" LOS: 42 days   Patient Care Team:  Margarita Woods APRN as PCP - General (Nurse Practitioner)  Winnie Sanchez MD as Referring Physician (Obstetrics and Gynecology)  Norberto Almaraz MD PhD as Consulting Physician (Hematology and Oncology)  Lupis Thomas MD as Consulting Physician (Nephrology)  Hair Mckeon MD as Consulting Physician (Nephrology)  Juana Taylor MD as Consulting Physician (Cardiology)    Chief Complaint: Post-op    Subjective:  Symptoms:  Stable.  No shortness of breath, cough or chest pain.    Diet:  Poor intake (Cortrak had to be removed--refusing to allow it to be replaced. Also refusing PEG placement.).  No nausea or vomiting.    Activity level: Impaired due to weakness.    Pain:  She complains of pain that is mild.  She reports pain is unchanged.  Pain is well controlled.  (Intermittent abdominal cramping).       Vital Signs  Temp:  [98.1 °F (36.7 °C)-99 °F (37.2 °C)] 99 °F (37.2 °C)  Heart Rate:  [84-92] 89  Resp:  [16-18] 16  BP: (121-149)/() 149/108  Body mass index is 20.7 kg/m².    Intake/Output Summary (Last 24 hours) at 12/7/2023 1046  Last data filed at 12/7/2023 0756  Gross per 24 hour   Intake 240 ml   Output 2500 ml   Net -2260 ml     I/O this shift:  In: 120 [P.O.:120]  Out: -           12/05/23  0500 12/06/23  0500 12/07/23  0604   Weight: 49.2 kg (108 lb 7.5 oz) 47.3 kg (104 lb 4.8 oz) 54.7 kg (120 lb 9.5 oz)     Objective:  General Appearance:  Comfortable and in no acute distress.    Vital signs: (most recent): Blood pressure (!) 149/108, pulse 89, temperature 99 °F (37.2 °C), temperature source Oral, resp. rate 16, height 162.6 cm (64\"), weight 54.7 kg (120 lb 9.5 oz), last menstrual period 08/10/2022, SpO2 100%, not currently breastfeeding.  Vital signs are normal.  No fever.    Output: No urine output and producing stool.    HEENT: (NG tube in place)    Lungs:  Normal effort and normal respiratory rate.  There are decreased breath sounds.    Heart: " "Normal rate.  Regular rhythm.  (SR on tele monitor)  Abdomen: Abdomen is soft and non-distended.  Bowel sounds are normal.     Pulses: Distal pulses are intact.    Neurological: Patient is oriented to person, place and time.  (drowsy).    Skin:  Warm and dry.  (Sternal wound healing well without erythema or drainage)          Results Review:        WBC WBC   Date Value Ref Range Status   12/07/2023 7.98 3.40 - 10.80 10*3/mm3 Final   12/06/2023 9.55 3.40 - 10.80 10*3/mm3 Final   12/05/2023 9.60 3.40 - 10.80 10*3/mm3 Final      HGB Hemoglobin   Date Value Ref Range Status   12/07/2023 6.9 (C) 12.0 - 15.9 g/dL Final   12/06/2023 7.9 (L) 12.0 - 15.9 g/dL Final   12/05/2023 7.9 (L) 12.0 - 15.9 g/dL Final   12/04/2023 8.0 (L) 12.0 - 15.9 g/dL Final      HCT Hematocrit   Date Value Ref Range Status   12/07/2023 20.6 (C) 34.0 - 46.6 % Final   12/06/2023 23.9 (L) 34.0 - 46.6 % Final   12/05/2023 24.7 (L) 34.0 - 46.6 % Final   12/04/2023 24.7 (L) 34.0 - 46.6 % Final      Platelets Platelets   Date Value Ref Range Status   12/07/2023 145 140 - 450 10*3/mm3 Final   12/06/2023 155 140 - 450 10*3/mm3 Final   12/05/2023 164 140 - 450 10*3/mm3 Final        PT/INR:  No results found for: \"PROTIME\"/No results found for: \"INR\"    Sodium Sodium   Date Value Ref Range Status   12/07/2023 127 (L) 136 - 145 mmol/L Final   12/06/2023 129 (L) 136 - 145 mmol/L Final   12/05/2023 129 (L) 136 - 145 mmol/L Final      Potassium Potassium   Date Value Ref Range Status   12/07/2023 4.2 3.5 - 5.2 mmol/L Final   12/06/2023 4.6 3.5 - 5.2 mmol/L Final     Comment:     Slight hemolysis detected by analyzer. Result may be falsely elevated.   12/05/2023 4.3 3.5 - 5.2 mmol/L Final      Chloride Chloride   Date Value Ref Range Status   12/07/2023 96 (L) 98 - 107 mmol/L Final   12/06/2023 96 (L) 98 - 107 mmol/L Final   12/05/2023 95 (L) 98 - 107 mmol/L Final      Bicarbonate CO2   Date Value Ref Range Status   12/07/2023 22.0 22.0 - 29.0 mmol/L Final "   12/06/2023 21.0 (L) 22.0 - 29.0 mmol/L Final   12/05/2023 22.3 22.0 - 29.0 mmol/L Final      BUN BUN   Date Value Ref Range Status   12/07/2023 15 6 - 20 mg/dL Final   12/06/2023 33 (H) 6 - 20 mg/dL Final   12/05/2023 20 6 - 20 mg/dL Final      Creatinine Creatinine   Date Value Ref Range Status   12/07/2023 3.39 (H) 0.57 - 1.00 mg/dL Final   12/06/2023 5.37 (H) 0.57 - 1.00 mg/dL Final   12/05/2023 3.45 (H) 0.57 - 1.00 mg/dL Final      Calcium Calcium   Date Value Ref Range Status   12/07/2023 8.4 (L) 8.6 - 10.5 mg/dL Final   12/06/2023 8.7 8.6 - 10.5 mg/dL Final   12/05/2023 8.4 (L) 8.6 - 10.5 mg/dL Final      Magnesium Magnesium   Date Value Ref Range Status   12/07/2023 1.7 1.6 - 2.6 mg/dL Final            amLODIPine, 10 mg, Oral, Q24H  aspirin, 81 mg, Nasogastric, Daily  carvedilol, 25 mg, Nasogastric, Q12H  escitalopram, 10 mg, Nasogastric, Daily  hydroxychloroquine, 200 mg, Oral, Daily  Lacosamide, 100 mg, Intravenous, Q12H  lactulose, 10 g, Nasogastric, TID  lidocaine, 10 mL, Subcutaneous, Once  lisinopril, 10 mg, Oral, Q24H  mupirocin, , Each Nare, BID  mycophenolate, 250 mg, Oral, Q12H  ondansetron, 4 mg, Oral, TID AC  pantoprazole, 40 mg, Oral, QAM AC           Assessment & Plan    - Ascending aortic aneurysm with dissection- s/p ascending aortic dissection repair/proximal replacement, gacron interposition graft, kelli procedure; insertion of right femoral shiley---Dr. Medina  - Cardiac tamponade- reexploration for bleeding, removal of large clot compressing the RV--Dr. Lowe  - severe aortic valve insufficiency  - Encephalopathy, improving   - ESRD on PD  - Lupus--Cellcept/plaquenil restarted 11/23 and patient seems to be improving  - Epilepsy  - chronic immunosuppression  - hypertension  - chronic anemia--stable  - TCP--chronic; improving  - Depression--started on lexapro 11/14  - right MCA CVA--continue ASA per neuro  - hyponatremia--hypervolemic, improving--nephrology following  - malnutrition  r/t decreased caloric intake--nutrition following  - hypokalemia    Having some chest pressure this morning with decreasing H/H requiring transfusion. States the chest pressure is only with exertion. Otherwise she is hemodynamically stable. Denies any other symptoms. Eliquis is being held. We will recheck an echo and cxr and d/w Dr. Medina. An occult stool has already been ordered.     Shea Monteiro, CAESAR  12/07/23  10:46 EST

## 2023-12-07 NOTE — PLAN OF CARE
Goal Outcome Evaluation:  Plan of Care Reviewed With: patient        Progress: no change  Outcome Evaluation: Complaints of pain overnight. Pain medication offered but refused. Up to chair with ast x 1.

## 2023-12-07 NOTE — PROGRESS NOTES
[facility] Progress Note      Patient Name: Dee Herrera  : 1990  MRN: [id number]  Primary Care Physician:  Margarita Woods, CAESAR  Date of admission: 10/26/2023    Subjective     Interval History:   Follow-up ESRD.  Chest pain this am with HG less than 7.  Received one unit of blood . No pain now. Trying to eat some.      Review of Systems:   As noted above    Objective     Vitals:   Temp:  [98.1 °F (36.7 °C)-99 °F (37.2 °C)] 98.6 °F (37 °C)  Heart Rate:  [73-92] 73  Resp:  [16-18] 16  BP: (121-149)/() 127/93    Intake/Output Summary (Last 24 hours) at 2023 1705  Last data filed at 2023 1300  Gross per 24 hour   Intake 480 ml   Output --   Net 480 ml       Physical Exam:    General Appearance: Eyes open, more conversant.  Does answer questions. Still with very flat affect .  Skin: warm and dry  HEENT: oral mucosa dry.   Nonicteric sclera  Neck: Left IJ tunneled dialysis catheter .  Lungs: clear to auscultation bilaterally.  Heart: RRR, normal S1 and S2  Abdomen: soft, nontender, nondistended.  +bs  Extremities: no edema.      Scheduled Meds:     amLODIPine, 10 mg, Oral, Q24H  aspirin, 81 mg, Nasogastric, Daily  carvedilol, 25 mg, Nasogastric, Q12H  escitalopram, 10 mg, Nasogastric, Daily  hydroxychloroquine, 200 mg, Oral, Daily  Lacosamide, 100 mg, Intravenous, Q12H  lactulose, 10 g, Nasogastric, TID  lidocaine, 10 mL, Subcutaneous, Once  lisinopril, 10 mg, Oral, Q24H  mupirocin, , Each Nare, BID  mycophenolate, 250 mg, Oral, Q12H  ondansetron, 4 mg, Oral, TID AC  pantoprazole, 40 mg, Oral, QAM AC      IV Meds:          Results Reviewed:   I have personally reviewed the results from the time of this admission to 2023 17:05 EST     Results from last 7 days   Lab Units 23  0651 23  0612 23  0707   SODIUM mmol/L 127* 129* 129*   POTASSIUM mmol/L 4.2 4.6 4.3   CHLORIDE mmol/L 96* 96* 95*   CO2 mmol/L 22.0 21.0* 22.3   BUN mg/dL 15 33* 20    CREATININE mg/dL 3.39* 5.37* 3.45*   CALCIUM mg/dL 8.4* 8.7 8.4*   BILIRUBIN mg/dL 0.3 0.3 0.5   ALK PHOS U/L 79 98 92   ALT (SGPT) U/L 9 9 9   AST (SGOT) U/L 21 26 26   GLUCOSE mg/dL 99 96 113*       Estimated Creatinine Clearance: 20.6 mL/min (A) (by C-G formula based on SCr of 3.39 mg/dL (H)).    Results from last 7 days   Lab Units 12/07/23  0651 12/05/23  0707 12/04/23  0515 12/03/23  1043 12/03/23  0405   MAGNESIUM mg/dL 1.7  --   --  2.0 1.9   PHOSPHORUS mg/dL  --  3.8 5.3*  --  4.8*             Results from last 7 days   Lab Units 12/07/23  0651 12/06/23  0612 12/05/23  0707 12/04/23 2005 12/04/23  0515 12/03/23  0405   WBC 10*3/mm3 7.98 9.55 9.60  --  10.24 10.27   HEMOGLOBIN g/dL 6.9* 7.9* 7.9* 8.0* 6.5* 7.5*   PLATELETS 10*3/mm3 145 155 164  --  182 209             Assessment / Plan     ASSESSMENT:  ESRD.  Failed PD. Now on hemodialysis.  Overall clinically looks better.  Dialysis tomorrow.  Hyponatremia will be corrected with diaylsis .  2.  Acute CVA cortical infarct in the anterior inferior medial right frontal lobe.  Subacute subdural hematoma right occipital.  3.  Ascending aortic aneurysm with subacute/chronic aortic root dissection.  Status post repair of proximal aortic aneurysm 11/2/2023.  Re- exploration for cardiac tamponade 11/6/2023. Chest pain this am with significant anemia.  Repeat echo with trivial pericardial effusion .  4.  Seizure disorder  on Vimpat  5.  Severe protein calorie malnutrition.   6.  SLE.  Currently off immunosuppression.  C3 mildly low, C4 normal.  Anti-double-stranded DNA antibody elevated 11/20/2023.  Plaquenil and CellCept .  7.  Anemia CKD. PRBC given this am .  PLAN:  Hemodialysis tomorrow.  Thank you for involving us in the care of Dee Herrera.  Please feel free to call with any questions.    Regi Hyde MD  12/07/23  17:05 [facility]    [facility]  [phone]    Please note that portions of this note were completed with a voice  recognition program.

## 2023-12-07 NOTE — SIGNIFICANT NOTE
12/07/23 1008   OTHER   Discipline occupational therapist   Rehab Time/Intention   Session Not Performed unable to treat, medical status change  (hbg 6.9 today, will hold OT until level becomes appropriate for therapy participation.)   Recommendation   OT - Next Appointment 12/08/23

## 2023-12-07 NOTE — PROGRESS NOTES
HOSPITAL FOLLOW UP NOTE    Patient Name: Dee Herrera  Patient : 1990        Date of Service:23  Provider of Service: CAESAR Thomas  Place of Service: [facility]  Referral Provider: No ref. provider found          Follow Up: Ascending aortic aneurysm and dissection    Interval Hx: VSS, resting    OBJECTIVE  Temp:  [98.1 °F (36.7 °C)-98.4 °F (36.9 °C)] 98.4 °F (36.9 °C)  Heart Rate:  [84-92] 84  Resp:  [18] 18  BP: (123-145)/() 145/102     Intake/Output Summary (Last 24 hours) at 2023 0837  Last data filed at 2023 0756  Gross per 24 hour   Intake 480 ml   Output 2500 ml   Net -2020 ml     Body mass index is 20.7 kg/m².      23  0500 23  0500 23  0604   Weight: 49.2 kg (108 lb 7.5 oz) 47.3 kg (104 lb 4.8 oz) 54.7 kg (120 lb 9.5 oz)         Physical Exam:     General Appearance:    Alert, cooperative, in no acute distress   Head:    Normocephalic, without obvious abnormality, atraumatic   Eyes:            Conjunctivae and sclerae normal, no   icterus, no pallor, corneas clear, PERRLA   Neck:   No adenopathy, supple, trachea midline, no thyromegaly, no   carotid bruit, no JVD   Lungs:     Clear to auscultation,respirations regular, even and unlabored    Heart:    Regular rhythm and normal rate, normal S1 and S2, no murmur, no gallop, no rub, no click   Chest Wall:    No abnormalities observed   Abdomen:     Normal bowel sounds, no masses, no organomegaly, soft, nontender, nondistended, no guarding, no rebound  tenderness   Extremities:   Moves all extremities well, no edema, no cyanosis, no redness   Pulses:   Pulses palpable and equal bilaterally.          CURRENT MEDS    Scheduled Meds:amLODIPine, 10 mg, Oral, Q24H  aspirin, 81 mg, Nasogastric, Daily  carvedilol, 25 mg, Nasogastric, Q12H  chlorhexidine, 15 mL, Mouth/Throat, Q12H  escitalopram, 10 mg, Nasogastric, Daily  First Mouthwash (Magic Mouthwash), 10 mL, Swish & Spit,  Q6H  hydroxychloroquine, 200 mg, Oral, Daily  Lacosamide, 100 mg, Intravenous, Q12H  lactulose, 10 g, Nasogastric, TID  lidocaine, 10 mL, Subcutaneous, Once  lisinopril, 10 mg, Oral, Q24H  mupirocin, , Each Nare, BID  mycophenolate, 250 mg, Oral, Q12H  ondansetron, 4 mg, Oral, TID AC  pantoprazole, 40 mg, Oral, QAM AC      Continuous Infusions:       Lab Review:   Results from last 7 days   Lab Units 12/07/23  0651 12/06/23  0612   SODIUM mmol/L 127* 129*   POTASSIUM mmol/L 4.2 4.6   CHLORIDE mmol/L 96* 96*   CO2 mmol/L 22.0 21.0*   BUN mg/dL 15 33*   CREATININE mg/dL 3.39* 5.37*   GLUCOSE mg/dL 99 96   CALCIUM mg/dL 8.4* 8.7   AST (SGOT) U/L 21 26   ALT (SGPT) U/L 9 9         Results from last 7 days   Lab Units 12/07/23  0651 12/06/23  0612   WBC 10*3/mm3 7.98 9.55   HEMOGLOBIN g/dL 6.9* 7.9*   HEMATOCRIT % 20.6* 23.9*   PLATELETS 10*3/mm3 145 155         Results from last 7 days   Lab Units 12/03/23  1043 12/03/23  0405   MAGNESIUM mg/dL 2.0 1.9                       ASSESSMENT & PLAN    Dissecting ascending aortic aneurysm    Thrombocytopenia    Systemic lupus erythematosus    End stage renal disease on dialysis    Seizure disorder    Elevated liver function tests    Essential hypertension    Anemia due to chronic kidney disease, on chronic dialysis    Hyponatremia    Moderate malnutrition    Chronic diastolic CHF (congestive heart failure)    Aortic valve lesion    Severe aortic valve regurgitation    Recent cerebrovascular accident (CVA)    Internal jugular (IJ) vein thromboembolism, chronic      1.  Ascending aortic aneurysm with subacute/chronic aortic root dissection: Status post aneurysm repair and replacement on 11/2/2023  2.  Severe aortic regurgitation: Secondary to #1.  Status post repair on 11/2/2023  3.  Cardiac tamponade: Secondary to pericardial thrombus anteriorly and compressing right ventricle.  Requiring reexploration with clot removal and treatment of a small amount of bleeding at the suture  line on 11/6/2023  4.  Seizures: Started postoperatively  5.  ESRD: Secondary to lupus nephritis.  On peritoneal dialysis at home.  On hemodialysis while in hospital.  2.5 L removed yesterday  6.  Hyponatremia: Persistent  7.  Hypertension: Diastolic running high.  On carvedilol 25 mg twice daily and amlodipine.     8.  Chronic anemia: Hb appears stable over the last 24 hrs  9.  Systemic lupus erythematosus: Hydroxychloroquine and mycophenolate resumed  10.  Right MCA CVA: Confirmed by MRI.  Neurology expects full recovery  11.  Right IJ DVT: Subacute    Stable from a cardiac standpoint.  Ok to discharge to SNF from our standpoint when okay with admitting.  We will sign off at this time.      Nilda Oseguera, APRN  12/07/23

## 2023-12-08 LAB
ALBUMIN SERPL-MCNC: 2.1 G/DL (ref 3.5–5.2)
ALBUMIN/GLOB SERPL: 0.6 G/DL
ALP SERPL-CCNC: 81 U/L (ref 39–117)
ALT SERPL W P-5'-P-CCNC: 7 U/L (ref 1–33)
ANION GAP SERPL CALCULATED.3IONS-SCNC: 10.8 MMOL/L (ref 5–15)
AST SERPL-CCNC: 21 U/L (ref 1–32)
BH BB BLOOD EXPIRATION DATE: NORMAL
BH BB BLOOD TYPE BARCODE: 5100
BH BB DISPENSE STATUS: NORMAL
BH BB PRODUCT CODE: NORMAL
BH BB UNIT NUMBER: NORMAL
BILIRUB SERPL-MCNC: 0.4 MG/DL (ref 0–1.2)
BUN SERPL-MCNC: 25 MG/DL (ref 6–20)
BUN/CREAT SERPL: 5.3 (ref 7–25)
CALCIUM SPEC-SCNC: 8.5 MG/DL (ref 8.6–10.5)
CHLORIDE SERPL-SCNC: 95 MMOL/L (ref 98–107)
CO2 SERPL-SCNC: 20.2 MMOL/L (ref 22–29)
CREAT SERPL-MCNC: 4.73 MG/DL (ref 0.57–1)
CROSSMATCH INTERPRETATION: NORMAL
DEPRECATED RDW RBC AUTO: 42.4 FL (ref 37–54)
EGFRCR SERPLBLD CKD-EPI 2021: 12 ML/MIN/1.73
ERYTHROCYTE [DISTWIDTH] IN BLOOD BY AUTOMATED COUNT: 14 % (ref 12.3–15.4)
GEN 5 2HR TROPONIN T REFLEX: 120 NG/L
GLOBULIN UR ELPH-MCNC: 3.3 GM/DL
GLUCOSE SERPL-MCNC: 83 MG/DL (ref 65–99)
HCT VFR BLD AUTO: 29.7 % (ref 34–46.6)
HGB BLD-MCNC: 9.6 G/DL (ref 12–15.9)
MAGNESIUM SERPL-MCNC: 1.6 MG/DL (ref 1.6–2.6)
MCH RBC QN AUTO: 27 PG (ref 26.6–33)
MCHC RBC AUTO-ENTMCNC: 32.3 G/DL (ref 31.5–35.7)
MCV RBC AUTO: 83.7 FL (ref 79–97)
PLATELET # BLD AUTO: 144 10*3/MM3 (ref 140–450)
PMV BLD AUTO: 9.7 FL (ref 6–12)
POTASSIUM SERPL-SCNC: 3.4 MMOL/L (ref 3.5–5.2)
POTASSIUM SERPL-SCNC: 4.7 MMOL/L (ref 3.5–5.2)
PROT SERPL-MCNC: 5.4 G/DL (ref 6–8.5)
QT INTERVAL: 399 MS
QTC INTERVAL: 475 MS
RBC # BLD AUTO: 3.55 10*6/MM3 (ref 3.77–5.28)
SODIUM SERPL-SCNC: 126 MMOL/L (ref 136–145)
TROPONIN T DELTA: 9 NG/L
TROPONIN T SERPL HS-MCNC: 111 NG/L
UNIT  ABO: NORMAL
UNIT  RH: NORMAL
WBC NRBC COR # BLD AUTO: 7.16 10*3/MM3 (ref 3.4–10.8)

## 2023-12-08 PROCEDURE — 25010000002 LACOSAMIDE 200 MG/20ML SOLUTION: Performed by: SURGERY

## 2023-12-08 PROCEDURE — 85027 COMPLETE CBC AUTOMATED: CPT | Performed by: STUDENT IN AN ORGANIZED HEALTH CARE EDUCATION/TRAINING PROGRAM

## 2023-12-08 PROCEDURE — 83735 ASSAY OF MAGNESIUM: CPT | Performed by: INTERNAL MEDICINE

## 2023-12-08 PROCEDURE — 97530 THERAPEUTIC ACTIVITIES: CPT

## 2023-12-08 PROCEDURE — C9254 INJECTION, LACOSAMIDE: HCPCS | Performed by: SURGERY

## 2023-12-08 PROCEDURE — 63710000001 ONDANSETRON PER 8 MG: Performed by: SURGERY

## 2023-12-08 PROCEDURE — 84484 ASSAY OF TROPONIN QUANT: CPT | Performed by: INTERNAL MEDICINE

## 2023-12-08 PROCEDURE — 99024 POSTOP FOLLOW-UP VISIT: CPT | Performed by: THORACIC SURGERY (CARDIOTHORACIC VASCULAR SURGERY)

## 2023-12-08 PROCEDURE — 93005 ELECTROCARDIOGRAM TRACING: CPT | Performed by: INTERNAL MEDICINE

## 2023-12-08 PROCEDURE — 93010 ELECTROCARDIOGRAM REPORT: CPT | Performed by: INTERNAL MEDICINE

## 2023-12-08 PROCEDURE — 80053 COMPREHEN METABOLIC PANEL: CPT | Performed by: STUDENT IN AN ORGANIZED HEALTH CARE EDUCATION/TRAINING PROGRAM

## 2023-12-08 PROCEDURE — 63710000001 MYCOPHENOLATE MOFETIL PER 250 MG: Performed by: SURGERY

## 2023-12-08 PROCEDURE — 84132 ASSAY OF SERUM POTASSIUM: CPT | Performed by: INTERNAL MEDICINE

## 2023-12-08 RX ORDER — LACOSAMIDE 100 MG/1
100 TABLET ORAL EVERY 12 HOURS SCHEDULED
Status: DISCONTINUED | OUTPATIENT
Start: 2023-12-08 | End: 2023-12-09 | Stop reason: HOSPADM

## 2023-12-08 RX ADMIN — ACETAMINOPHEN 650 MG: 325 TABLET, FILM COATED ORAL at 06:13

## 2023-12-08 RX ADMIN — MYCOPHENOLATE MOFETIL 250 MG: 500 TABLET, FILM COATED ORAL at 08:28

## 2023-12-08 RX ADMIN — CARVEDILOL 25 MG: 25 TABLET, FILM COATED ORAL at 20:37

## 2023-12-08 RX ADMIN — NITROGLYCERIN 0.4 MG: 0.4 TABLET SUBLINGUAL at 18:24

## 2023-12-08 RX ADMIN — LACOSAMIDE 100 MG: 10 INJECTION INTRAVENOUS at 10:23

## 2023-12-08 RX ADMIN — CARVEDILOL 25 MG: 25 TABLET, FILM COATED ORAL at 08:28

## 2023-12-08 RX ADMIN — ASPIRIN 81 MG: 81 TABLET, CHEWABLE ORAL at 08:28

## 2023-12-08 RX ADMIN — LISINOPRIL 10 MG: 10 TABLET ORAL at 08:28

## 2023-12-08 RX ADMIN — HYDROXYCHLOROQUINE SULFATE 200 MG: 200 TABLET ORAL at 08:28

## 2023-12-08 RX ADMIN — PANTOPRAZOLE SODIUM 40 MG: 40 TABLET, DELAYED RELEASE ORAL at 06:13

## 2023-12-08 RX ADMIN — ONDANSETRON HYDROCHLORIDE 4 MG: 4 TABLET, FILM COATED ORAL at 06:13

## 2023-12-08 RX ADMIN — ONDANSETRON HYDROCHLORIDE 4 MG: 4 TABLET, FILM COATED ORAL at 17:05

## 2023-12-08 RX ADMIN — AMLODIPINE BESYLATE 10 MG: 10 TABLET ORAL at 08:28

## 2023-12-08 RX ADMIN — LACOSAMIDE 100 MG: 100 TABLET, FILM COATED ORAL at 20:37

## 2023-12-08 RX ADMIN — ESCITALOPRAM OXALATE 10 MG: 10 TABLET, FILM COATED ORAL at 08:28

## 2023-12-08 RX ADMIN — ONDANSETRON HYDROCHLORIDE 4 MG: 4 TABLET, FILM COATED ORAL at 11:17

## 2023-12-08 NOTE — THERAPY TREATMENT NOTE
Patient Name: Dee Herrera  : 1990    MRN: [id number]                              Today's Date: 2023       Admit Date: 10/26/2023    Visit Dx:     ICD-10-CM ICD-9-CM   1. Shortness of breath  R06.02 786.05   2. ESRD (end stage renal disease) on dialysis  N18.6 585.6    Z99.2 V45.11   3. Hyponatremia  E87.1 276.1   4. Generalized abdominal pain  R10.84 789.07   5. Elevated lactic acid level  R79.89 276.2   6. Elevated troponin  R79.89 790.6   7. Severe aortic valve regurgitation  I35.1 424.1   8. Pericardial effusion  I31.39 423.9   9. S/P AVR  Z95.2 V43.3   10. Recent cerebrovascular accident (CVA)  Z86.73 V49.89   11. Peritoneal dialysis catheter in place  Z99.2 V45.11   12. End stage renal disease on dialysis  N18.6 585.6    Z99.2 V45.11     Patient Active Problem List   Diagnosis    Vitamin D deficiency    Iron deficiency anemia    Morning stiffness of joints    Iron deficiency anemia, unspecified iron deficiency anemia type    Thrombocytopenia    Acute renal failure (ARF)    Hypertension secondary to other renal disorders    Pancytopenia    Hypoalbuminemia    Volume overload    Ear drainage right    T.T.P. syndrome    Systemic lupus erythematosus    Lupus nephritis, ISN/RPS class IV    Hypokalemia    Hypocalcemia    COVID-19    Hospital discharge follow-up    Stage 5 chronic kidney disease    Cardiac cirrhosis    Pancreatitis    Duodenitis    Regional enteritis of small bowel    Pericardial effusion    End stage renal disease on dialysis    Hemodialysis status    Seizure disorder    Elevated liver function tests    C. difficile colitis    Anemia, chronic disease    Essential hypertension    Peritoneal dialysis catheter in place    Anemia due to chronic kidney disease, on chronic dialysis    Alternating constipation and diarrhea    Abnormal stress test    Hyponatremia    Poor appetite    Moderate malnutrition    Chronic diastolic CHF (congestive heart failure)    Aortic valve lesion    Severe  aortic valve regurgitation    Dissecting ascending aortic aneurysm    Recent cerebrovascular accident (CVA)    Internal jugular (IJ) vein thromboembolism, chronic     Past Medical History:   Diagnosis Date    Anasarca     PER CT SCAN    Dry skin     ESRD (end stage renal disease) on dialysis     TUES, THURS, SAT [address]    History of abdominal pain     History of anemia     History of transfusion     Hypertension     Iron deficiency anemia 09/27/2021    Lupus (systemic lupus erythematosus) 07/30/2022    Migraine     Other specified nutritional anemias     Pericardial effusion     Renal insufficiency     Seizures     STATES LAST WAS 1/2023    Shortness of breath     OCCASIONAL    Vitamin D deficiency 09/27/2021     Past Surgical History:   Procedure Laterality Date    ASCENDING AORTIC ANEURYSM REPAIR W/ MECHANICAL AORTIC VALVE REPLACEMENT N/A 11/2/2023    Procedure: CHETNA STERNOTOMY, AORTIC ROOT REPLACEMENT WITH VALVE SPARING MISHEL PROCEDURE, REPLACEMENT OF ASCENDING AORTA, RIGHT FEMORAL DIALYSIS CATHETER PLACEMENT AND PRP;  Surgeon: Chris Medina MD;  Location: [facility] CVOR;  Service: Cardiothoracic;  Laterality: N/A;    CARDIAC CATHETERIZATION N/A 06/14/2023    Procedure: Coronary angiography;  Surgeon: Juana Taylor MD;  Location: [facility] CATH INVASIVE LOCATION;  Service: Cardiovascular;  Laterality: N/A;    CARDIAC CATHETERIZATION N/A 06/14/2023    Procedure: Left heart cath;  Surgeon: Juana Taylor MD;  Location: [facility] CATH INVASIVE LOCATION;  Service: Cardiovascular;  Laterality: N/A;    CARDIAC CATHETERIZATION N/A 06/14/2023    Procedure: Right Heart Cath;  Surgeon: Juana Taylor MD;  Location: [facility] CATH INVASIVE LOCATION;  Service: Cardiovascular;  Laterality: N/A;    COLONOSCOPY N/A 7/20/2023    Procedure: COLONOSCOPY to cecum with biopsy;  Surgeon: Drew Kaminski MD;  Location: [facility] ENDOSCOPY;  Service: Gastroenterology;  Laterality: N/A;  PRE - diarrhea, constipation  POST - fair  prep, normal    CORONARY ARTERY BYPASS GRAFT N/A 11/6/2023    Procedure: STERNAL EXPLORATION AND WASH OUT;  Surgeon: Jr Mitesh Quiroz MD;  Location: [facility] CVOR;  Service: Cardiothoracic;  Laterality: N/A;    ENDOSCOPY N/A 7/20/2023    Procedure: ESOPHAGOGASTRODUODENOSCOPY with biopsy;  Surgeon: Drew Kaminski MD;  Location: [facility] ENDOSCOPY;  Service: Gastroenterology;  Laterality: N/A;  PRE - abn ct abd  POST - gastritis    INSERTION HEMODIALYSIS CATHETER N/A 07/26/2022    Procedure: RIGHT TUNNELED DIALYSIS CATHETER PLACEMENT;  Surgeon: Diandra Adhikari MD;  Location: [facility] MAIN OR;  Service: Vascular;  Laterality: N/A;    INSERTION HEMODIALYSIS CATHETER Left 11/29/2023    Procedure: TUNNELED DIALYSIS CATHETER PLACEMENT;  Surgeon: Jose Patel II, MD;  Location: [facility] MAIN OR;  Service: Vascular;  Laterality: Left;    INSERTION PERITONEAL DIALYSIS CATHETER N/A 04/03/2023    Procedure: INSERTION PERITONEAL DIALYSIS CATHETER LAPAROSCOPIC, omentumpexy;  Surgeon: Jemal Loyola MD;  Location: [facility] MAIN OR;  Service: General;  Laterality: N/A;    REMOVAL PERITONEAL DIALYSIS CATHETER N/A 12/1/2023    Procedure: REMOVAL PERITONEAL DIALYSIS CATHETER;  Surgeon: Maryellen Ashton MD;  Location: [facility] MAIN OR;  Service: General;  Laterality: N/A;    TONSILLECTOMY        General Information       Row Name 12/08/23 1233          OT Time and Intention    Document Type therapy note (daily note)  -SM     Mode of Treatment occupational therapy;individual therapy  -       Row Name 12/08/23 1233          General Information    Patient Profile Reviewed yes  -SM     Existing Precautions/Restrictions fall;sternal;cardiac  -SM       Row Name 12/08/23 1233          Cognition    Orientation Status (Cognition) oriented x 3  -SM       Row Name 12/08/23 1233          Safety Issues, Functional Mobility    Impairments Affecting Function (Mobility) balance;endurance/activity tolerance;strength;cognition  -SM                User Key  (r) = Recorded By, (t) = Taken By, (c) = Cosigned By      Initials Name Provider Type    Etta Maya OT Occupational Therapist                     Mobility/ADL's       Row Name 12/08/23 1234          Bed Mobility    Supine-Sit [place] (Bed Mobility) standby assist  -       Row Name 12/08/23 1234          Sit-Stand Transfer    Sit-Stand [place] (Transfers) contact guard  -       Row Name 12/08/23 1234          Functional Mobility    Functional Mobility- Ind. Level contact guard assist  -     Functional Mobility-Distance (Feet) --  25  -SM     Functional Mobility- Comment HHA for transfers and mobility to the sink  -       Row Name 12/08/23 1234          Grooming Assessment/Training    [place] Level (Grooming) contact guard assist;oral care regimen  -     Position (Grooming) sink side;supported standing  -               User Key  (r) = Recorded By, (t) = Taken By, (c) = Cosigned By      Initials Name Provider Type    Etta Maya OT Occupational Therapist                   Obj/Interventions       Row Name 12/08/23 1234          Shoulder (Therapeutic Exercise)    Shoulder AROM (Therapeutic Exercise) bilateral;flexion;extension;aBduction;aDduction;10 repetitions;sitting  forward punches  -       Row Name 12/08/23 1234          Elbow/Forearm (Therapeutic Exercise)    Elbow/Forearm AROM (Therapeutic Exercise) bilateral;flexion;extension;10 repetitions;sitting  -       Row Name 12/08/23 1234          Balance    Static Sitting Balance standby assist  -     Position, Sitting Balance sitting edge of bed  -     Static Standing Balance contact guard  -     Dynamic Standing Balance contact guard  -     Position/Device Used, Standing Balance supported  -               User Key  (r) = Recorded By, (t) = Taken By, (c) = Cosigned By      Initials Name Provider Type    Etta Maya OT Occupational Therapist                   Goals/Plan    No  documentation.                  Clinical Impression       Row Name 12/08/23 1235          Pain Assessment    Pretreatment Pain Rating 0/10 - no pain  -SM     Posttreatment Pain Rating 0/10 - no pain  -SM       Row Name 12/08/23 1235          Plan of Care Review    Plan of Care Reviewed With patient  -     Outcome Evaluation Pt participates in OT today and is able to complete UE exercises and standing grooming. Pt continues to require extra time for all activites and is generally weak and deconditioned. Anticipate pt is able to progress UE strengthening with light weight next OT session.  -       Row Name 12/08/23 1235          Therapy Plan Review/Discharge Plan (OT)    Anticipated Discharge Disposition (OT) inpatient rehabilitation facility;skilled nursing facility  -       Row Name 12/08/23 1235          Positioning and Restraints    Pre-Treatment Position in bed  -SM     Post Treatment Position chair  -SM     In Chair reclined;call light within reach;encouraged to call for assist;exit alarm on;notified nsg  -               User Key  (r) = Recorded By, (t) = Taken By, (c) = Cosigned By      Initials Name Provider Type    Etta Maya, MADELIN Occupational Therapist                   Outcome Measures       Row Name 12/08/23 1237          How much help from another is currently needed...    Putting on and taking off regular lower body clothing? 3  -SM     Bathing (including washing, rinsing, and drying) 2  -SM     Toileting (which includes using toilet bed pan or urinal) 3  -SM     Putting on and taking off regular upper body clothing 3  -SM     Taking care of personal grooming (such as brushing teeth) 3  -SM     Eating meals 3  -SM     AM-PAC 6 Clicks Score (OT) 17  -SM       Row Name 12/08/23 0879          How much help from another person do you currently need...    Turning from your back to your side while in flat bed without using bedrails? 4  -DS     Moving from lying on back to sitting on the side  of a flat bed without bedrails? 4  -DS     Moving to and from a bed to a chair (including a wheelchair)? 4  -DS     Standing up from a chair using your arms (e.g., wheelchair, bedside chair)? 3  -DS     Climbing 3-5 steps with a railing? 3  -DS     To walk in hospital room? 3  -DS     AM-PAC 6 Clicks Score (PT) 21  -DS     Highest Level of Mobility Goal 6 --> Walk 10 steps or more  -DS       Row Name 12/08/23 1237          Functional Assessment    Outcome Measure Options AM-PAC 6 Clicks Daily Activity (OT)  -               User Key  (r) = Recorded By, (t) = Taken By, (c) = Cosigned By      Initials Name Provider Type    Etta Maya OT Occupational Therapist    Margaret Vega, RN Registered Nurse                    Occupational Therapy Education       Title: PT OT SLP Therapies (In Progress)       Topic: Occupational Therapy (In Progress)       Point: ADL training (Done)       Description:   Instruct learner(s) on proper safety adaptation and remediation techniques during self care or transfers.   Instruct in proper use of assistive devices.                  Learning Progress Summary             Patient Acceptance, E, VU by  at 11/28/2023 1340    Comment: progress towards goals, OT POC, AROM HEP of UEs to increase endurance/strength   Family Acceptance, E, VU by  at 11/28/2023 1340    Comment: progress towards goals, OT POC, AROM HEP of UEs to increase endurance/strength                         Point: Home exercise program (Not Started)       Description:   Instruct learner(s) on appropriate technique for monitoring, assisting and/or progressing therapeutic exercises/activities.                  Learner Progress:  Not documented in this visit.              Point: Precautions (Not Started)       Description:   Instruct learner(s) on prescribed precautions during self-care and functional transfers.                  Learner Progress:  Not documented in this visit.              Point: Body mechanics  (Not Started)       Description:   Instruct learner(s) on proper positioning and spine alignment during self-care, functional mobility activities and/or exercises.                  Learner Progress:  Not documented in this visit.                              User Key       Initials Effective Dates Name Provider Type Discipline     04/02/20 -  Etta Bustillos OT Occupational Therapist OT                  OT Recommendation and Plan  Planned Therapy Interventions (OT): activity tolerance training, adaptive equipment training, BADL retraining, functional balance retraining, occupation/activity based interventions, patient/caregiver education/training, transfer/mobility retraining, strengthening exercise, ROM/therapeutic exercise  Therapy Frequency (OT): 5 times/wk  Plan of Care Review  Plan of Care Reviewed With: patient  Progress: improving  Outcome Evaluation: Pt participates in OT today and is able to complete UE exercises and standing grooming. Pt continues to require extra time for all activites and is generally weak and deconditioned. Anticipate pt is able to progress UE strengthening with light weight next OT session.     Time Calculation:         Time Calculation- OT       Row Name 12/08/23 1238             Time Calculation- OT    OT Start Time 1010  -      OT Stop Time 1023  -      OT Time Calculation (min) 13 min  -      Total Timed Code Minutes- OT 13 minute(s)  -SM      OT Received On 12/08/23  -      OT - Next Appointment 12/11/23  -         Timed Charges    28528 - OT Therapeutic Activity Minutes 13  -SM         Total Minutes    Timed Charges Total Minutes 13  -SM       Total Minutes 13  -SM                User Key  (r) = Recorded By, (t) = Taken By, (c) = Cosigned By      Initials Name Provider Type     Etta Bustillos OT Occupational Therapist                  Therapy Charges for Today       Code Description Service Date Service Provider Modifiers Qty    [id number]  OT THERAPEUTIC  ACT EA 15 MIN 12/8/2023 Etta Bustillos, OT GO 1                 Etta Bustillos OT  12/8/2023

## 2023-12-08 NOTE — PROGRESS NOTES
[facility] Progress Note      Patient Name: Dee Herrera  : 1990  MRN: [id number]  Primary Care Physician:  Margarita Woods, CAESAR  Date of admission: 10/26/2023    Subjective     Interval History:   Follow-up ESRD.      Patient is feeling the same, no chest pain, no nausea or vomiting, no abdominal pain, she  Chest pain this am with HG less than 7.  Received one unit of blood . No pain now. Trying to eat some.  She received packed RBCs transfusion yesterday    Review of Systems:   As noted above    Objective     Vitals:   Temp:  [98.2 °F (36.8 °C)-98.6 °F (37 °C)] 98.6 °F (37 °C)  Heart Rate:  [73-77] 76  Resp:  [16] 16  BP: (117-138)/(88-97) 138/97    Intake/Output Summary (Last 24 hours) at 2023 1025  Last data filed at 2023 0930  Gross per 24 hour   Intake 600 ml   Output --   Net 600 ml       Physical Exam:    General Appearance: Awake, alert, chronically ill and has flat affect..  Skin: warm and dry  HEENT: oral mucosa dry.   Nonicteric sclera  Neck: Left IJ tunneled dialysis catheter .  Lungs: clear to auscultation bilaterally.  Heart: RRR, normal S1 and S2  Abdomen: soft, nontender, nondistended.  +bs  Extremities: no edema.      Scheduled Meds:     amLODIPine, 10 mg, Oral, Q24H  aspirin, 81 mg, Nasogastric, Daily  carvedilol, 25 mg, Nasogastric, Q12H  escitalopram, 10 mg, Nasogastric, Daily  hydroxychloroquine, 200 mg, Oral, Daily  Lacosamide, 100 mg, Intravenous, Q12H  lactulose, 10 g, Nasogastric, TID  lidocaine, 10 mL, Subcutaneous, Once  lisinopril, 10 mg, Oral, Q24H  mupirocin, , Each Nare, BID  mycophenolate, 250 mg, Oral, Q12H  ondansetron, 4 mg, Oral, TID AC  pantoprazole, 40 mg, Oral, QAM AC      IV Meds:          Results Reviewed:   I have personally reviewed the results from the time of this admission to 2023 10:25 EST     Results from last 7 days   Lab Units 23  0556 23  0651 23  0612   SODIUM mmol/L 126* 127* 129*    POTASSIUM mmol/L 4.7 4.2 4.6   CHLORIDE mmol/L 95* 96* 96*   CO2 mmol/L 20.2* 22.0 21.0*   BUN mg/dL 25* 15 33*   CREATININE mg/dL 4.73* 3.39* 5.37*   CALCIUM mg/dL 8.5* 8.4* 8.7   BILIRUBIN mg/dL 0.4 0.3 0.3   ALK PHOS U/L 81 79 98   ALT (SGPT) U/L 7 9 9   AST (SGOT) U/L 21 21 26   GLUCOSE mg/dL 83 99 96       Estimated Creatinine Clearance: 13.2 mL/min (A) (by C-G formula based on SCr of 4.73 mg/dL (H)).    Results from last 7 days   Lab Units 12/07/23  0651 12/05/23  0707 12/04/23  0515 12/03/23  1043 12/03/23  0405   MAGNESIUM mg/dL 1.7  --   --  2.0 1.9   PHOSPHORUS mg/dL  --  3.8 5.3*  --  4.8*             Results from last 7 days   Lab Units 12/08/23  0556 12/07/23  2245 12/07/23  0651 12/06/23  0612 12/05/23  0707 12/04/23 2005 12/04/23  0515   WBC 10*3/mm3 7.16  --  7.98 9.55 9.60  --  10.24   HEMOGLOBIN g/dL 9.6* 9.3* 6.9* 7.9* 7.9*   < > 6.5*   PLATELETS 10*3/mm3 144  --  145 155 164  --  182    < > = values in this interval not displayed.             Assessment / Plan     ASSESSMENT:  ESRD.  Failed PD. Now on hemodialysis.  Overall clinically looks better.  Sodium 126, total CO2 20.2.  Plan for dialysis today.  2.  Acute CVA cortical infarct in the anterior inferior medial right frontal lobe.  Subacute subdural hematoma right occipital.  3.  Ascending aortic aneurysm with subacute/chronic aortic root dissection.  Status post repair of proximal aortic aneurysm 11/2/2023.  Re- exploration for cardiac tamponade 11/6/2023. Chest pain this am with significant anemia.  Repeat echo with trivial pericardial effusion .  4.  Seizure disorder  on Vimpat  5.  Severe protein calorie malnutrition.   6.  SLE.  Currently off immunosuppression.  C3 mildly low, C4 normal.  Anti-double-stranded DNA antibody elevated 11/20/2023.  Plaquenil and CellCept .  7.  Anemia CKD.  She received transfusion yesterday hemoglobin today up to 9.6    PLAN:  Hemodialysis today  Surveillance labs  Waiting for skilled nursing facility  rehab    I reviewed the chart and other providers notes, reviewed labs.  Discussed the case with the patient.  Copied text in this note has been reviewed and is accurate as of 12/08/23.     Thank you for involving us in the care of Dee D Javier.  Please feel free to call with any questions.    Isaac Diaz MD  12/08/23  10:25 [facility]    [facility]  [phone]    Please note that portions of this note were completed with a voice recognition program.

## 2023-12-08 NOTE — PLAN OF CARE
Goal Outcome Evaluation:  Plan of Care Reviewed With: patient        Progress: improving  Outcome Evaluation: Pt participates in OT today and is able to complete UE exercises and standing grooming. Pt continues to require extra time for all activites and is generally weak and deconditioned. Anticipate pt is able to progress UE strengthening with light weight next OT session.      Anticipated Discharge Disposition (OT): inpatient rehabilitation facility, skilled nursing facility

## 2023-12-08 NOTE — PLAN OF CARE
Problem: Adult Inpatient Plan of Care  Goal: Plan of Care Review  Outcome: Ongoing, Progressing  Flowsheets  Taken 12/8/2023 1829 by Margaret Phillips RN  Progress: no change  Outcome Evaluation: hemoglobin stable. HD performedtoday  Taken 12/8/2023 1235 by Etta Bustillos OT  Plan of Care Reviewed With: patient  Goal: Patient-Specific Goal (Individualized)  Outcome: Ongoing, Progressing  Goal: Absence of Hospital-Acquired Illness or Injury  Outcome: Ongoing, Progressing  Intervention: Identify and Manage Fall Risk  Recent Flowsheet Documentation  Taken 12/8/2023 1826 by Margaret Phillips RN  Safety Promotion/Fall Prevention:   assistive device/personal items within reach   clutter free environment maintained   nonskid shoes/slippers when out of bed   safety round/check completed  Taken 12/8/2023 1731 by Margaret Phillips RN  Safety Promotion/Fall Prevention:   assistive device/personal items within reach   clutter free environment maintained   nonskid shoes/slippers when out of bed   safety round/check completed  Taken 12/8/2023 1225 by Margaret Phillips RN  Safety Promotion/Fall Prevention:   assistive device/personal items within reach   clutter free environment maintained   nonskid shoes/slippers when out of bed   safety round/check completed  Taken 12/8/2023 1024 by Margaret Phillips RN  Safety Promotion/Fall Prevention:   assistive device/personal items within reach   clutter free environment maintained   nonskid shoes/slippers when out of bed   safety round/check completed  Taken 12/8/2023 0835 by Margaret Phillips RN  Safety Promotion/Fall Prevention:   assistive device/personal items within reach   clutter free environment maintained   nonskid shoes/slippers when out of bed   safety round/check completed  Intervention: Prevent and Manage VTE (Venous Thromboembolism) Risk  Recent Flowsheet Documentation  Taken 12/8/2023 1826 by Margaret Phillips, RN  Activity Management: activity encouraged  Taken 12/8/2023 1731 by  Margaret Phillips, RN  Activity Management: activity encouraged  Taken 12/8/2023 1225 by Margaret Phillips RN  Activity Management: activity encouraged  Taken 12/8/2023 1024 by Margaret Phillips RN  Activity Management: activity encouraged  Taken 12/8/2023 0835 by Margaret Phillips RN  Activity Management: activity encouraged  Intervention: Prevent Infection  Recent Flowsheet Documentation  Taken 12/8/2023 1826 by Margaret Phillips RN  Infection Prevention:   hand hygiene promoted   rest/sleep promoted  Taken 12/8/2023 1731 by Margaret Phillips RN  Infection Prevention:   hand hygiene promoted   rest/sleep promoted  Taken 12/8/2023 1225 by Margaret Phillips RN  Infection Prevention:   hand hygiene promoted   rest/sleep promoted  Taken 12/8/2023 1024 by Margaret Phillips RN  Infection Prevention:   hand hygiene promoted   rest/sleep promoted  Taken 12/8/2023 0835 by Margaret Phillips RN  Infection Prevention:   hand hygiene promoted   rest/sleep promoted  Goal: Optimal Comfort and Wellbeing  Outcome: Ongoing, Progressing  Goal: Readiness for Transition of Care  Outcome: Ongoing, Progressing     Problem: Skin Injury Risk Increased  Goal: Skin Health and Integrity  Outcome: Ongoing, Progressing     Problem: Malnutrition  Goal: Improved Nutritional Intake  Outcome: Ongoing, Progressing     Problem: Fall Injury Risk  Goal: Absence of Fall and Fall-Related Injury  Outcome: Ongoing, Progressing  Intervention: Promote Injury-Free Environment  Recent Flowsheet Documentation  Taken 12/8/2023 1826 by Margaret Phillips RN  Safety Promotion/Fall Prevention:   assistive device/personal items within reach   clutter free environment maintained   nonskid shoes/slippers when out of bed   safety round/check completed  Taken 12/8/2023 1731 by Margaret Phillips RN  Safety Promotion/Fall Prevention:   assistive device/personal items within reach   clutter free environment maintained   nonskid shoes/slippers when out of bed   safety round/check completed  Taken  12/8/2023 1225 by Margaret Phillips RN  Safety Promotion/Fall Prevention:   assistive device/personal items within reach   clutter free environment maintained   nonskid shoes/slippers when out of bed   safety round/check completed  Taken 12/8/2023 1024 by Margaret Phillips RN  Safety Promotion/Fall Prevention:   assistive device/personal items within reach   clutter free environment maintained   nonskid shoes/slippers when out of bed   safety round/check completed  Taken 12/8/2023 0835 by Margaret Phillips RN  Safety Promotion/Fall Prevention:   assistive device/personal items within reach   clutter free environment maintained   nonskid shoes/slippers when out of bed   safety round/check completed     Problem: Electrolyte Imbalance  Goal: Electrolyte Balance  Outcome: Ongoing, Progressing     Problem: Activity Intolerance (Cardiovascular Surgery)  Goal: Improved Activity Tolerance  Outcome: Ongoing, Progressing     Problem: Adjustment to Surgery (Cardiovascular Surgery)  Goal: Optimal Coping with Heart Surgery  Outcome: Ongoing, Progressing     Problem: Bleeding (Cardiovascular Surgery)  Goal: Bleeding (Cardiovascular Surgery)  Outcome: Ongoing, Progressing     Problem: Bowel Motility Impaired (Cardiovascular Surgery)  Goal: Effective Bowel Elimination (Cardiovascular Surgery)  Outcome: Ongoing, Progressing     Problem: Cardiac Function Impaired (Cardiovascular Surgery)  Goal: Effective Cardiac Function  Outcome: Ongoing, Progressing     Problem: Cerebral Tissue Perfusion (Cardiovascular Surgery)  Goal: Optimal Cerebral Tissue Perfusion (Cardiovascular Surgery)  Outcome: Ongoing, Progressing     Problem: Fluid and Electrolyte Imbalance (Cardiovascular Surgery)  Goal: Fluid and Electrolyte Balance (Cardiovascular Surgery)  Outcome: Ongoing, Progressing     Problem: Glycemic Control Impaired (Cardiovascular Surgery)  Goal: Blood Glucose Level Within Targeted Range (Cardiovascular Surgery)  Outcome: Ongoing, Progressing      Problem: Infection (Cardiovascular Surgery)  Goal: Absence of Infection Signs and Symptoms  Outcome: Ongoing, Progressing  Intervention: Prevent or Manage Infection  Recent Flowsheet Documentation  Taken 12/8/2023 1826 by Margaret Phillips RN  Infection Prevention:   hand hygiene promoted   rest/sleep promoted  Taken 12/8/2023 1731 by Margaret Phillips RN  Infection Prevention:   hand hygiene promoted   rest/sleep promoted  Taken 12/8/2023 1225 by Margaret Phillips RN  Infection Prevention:   hand hygiene promoted   rest/sleep promoted  Taken 12/8/2023 1024 by Margaret Phillips RN  Infection Prevention:   hand hygiene promoted   rest/sleep promoted  Taken 12/8/2023 0835 by Margraet Phillips RN  Infection Prevention:   hand hygiene promoted   rest/sleep promoted     Problem: Ongoing Anesthesia Effects (Cardiovascular Surgery)  Goal: Anesthesia/Sedation Recovery  Outcome: Ongoing, Progressing  Intervention: Optimize Anesthesia Recovery  Recent Flowsheet Documentation  Taken 12/8/2023 1826 by Margaret Phillips RN  Safety Promotion/Fall Prevention:   assistive device/personal items within reach   clutter free environment maintained   nonskid shoes/slippers when out of bed   safety round/check completed  Taken 12/8/2023 1731 by Margaret Phillips RN  Safety Promotion/Fall Prevention:   assistive device/personal items within reach   clutter free environment maintained   nonskid shoes/slippers when out of bed   safety round/check completed  Taken 12/8/2023 1225 by Margaret Phillips RN  Safety Promotion/Fall Prevention:   assistive device/personal items within reach   clutter free environment maintained   nonskid shoes/slippers when out of bed   safety round/check completed  Taken 12/8/2023 1024 by Margaret Phillips RN  Safety Promotion/Fall Prevention:   assistive device/personal items within reach   clutter free environment maintained   nonskid shoes/slippers when out of bed   safety round/check completed  Taken 12/8/2023 0835 by Alan  Margaret RN  Safety Promotion/Fall Prevention:   assistive device/personal items within reach   clutter free environment maintained   nonskid shoes/slippers when out of bed   safety round/check completed     Problem: Pain (Cardiovascular Surgery)  Goal: Acceptable Pain Control  Outcome: Ongoing, Progressing     Problem: Postoperative Nausea and Vomiting (Cardiovascular Surgery)  Goal: Nausea and Vomiting Relief (Cardiovascular Surgery)  Outcome: Ongoing, Progressing     Problem: Postoperative Urinary Retention (Cardiovascular Surgery)  Goal: Effective Urinary Elimination (Cardiovascular Surgery)  Outcome: Ongoing, Progressing     Problem: Respiratory Compromise (Cardiovascular Surgery)  Goal: Effective Oxygenation and Ventilation (Cardiovascular Surgery)  Outcome: Ongoing, Progressing     Problem: Seizure Disorder Comorbidity  Goal: Maintenance of Seizure Control  Outcome: Ongoing, Progressing     Problem: Restraint, Nonviolent  Goal: Absence of Harm or Injury  Outcome: Ongoing, Progressing   Goal Outcome Evaluation:           Progress: no change  Outcome Evaluation: hemoglobin stable. HD performedtoday

## 2023-12-08 NOTE — PROGRESS NOTES
" LOS: 43 days     Name: Dee Herrera  Age: 33 y.o.  Sex: female  :  1990  MRN: [id number]         Primary Care Physician: Margarita Woods, CAESAR    Subjective   Subjective  I last saw this patient several weeks ago when she looks worlds better than upon my last evaluation.  She is sitting up in the chair and is much more awake and communicative with me.  Nursing staff reports that her appetite and oral intake have improved but she is mainly just eating junk food and sweets.  She denies any pain.  She has been transitioned to hemodialysis since I last saw her.  Received a unit of packed red blood cells yesterday with improvement of her hemoglobin.  No obvious blood loss.  Going for dialysis today.    Objective   Vital Signs  Temp:  [98.2 °F (36.8 °C)-98.6 °F (37 °C)] 98.6 °F (37 °C)  Heart Rate:  [73-77] 76  Resp:  [16] 16  BP: (117-138)/(88-97) 138/97  Body mass index is 18.33 kg/m².    Objective:  General Appearance:  Comfortable and in no acute distress (Weak and deconditioned appearing).    Vital signs: (most recent): Blood pressure 131/87, pulse 78, temperature 98.4 °F (36.9 °C), temperature source Oral, resp. rate 18, height 162.6 cm (64\"), weight 47.3 kg (104 lb 3.2 oz), last menstrual period 08/10/2022, SpO2 98%, not currently breastfeeding.    Lungs:  Normal effort and normal respiratory rate.    Heart: Normal rate.  Regular rhythm.    Abdomen: Abdomen is soft.  Bowel sounds are normal.   There is no abdominal tenderness.     Extremities: There is no dependent edema or local swelling.    Neurological: Patient is alert and oriented to person, place and time.    Skin:  Warm and dry.                Results Review:       I reviewed the patient's new clinical results.    Results from last 7 days   Lab Units 23  0556 23  2245 23  0651 23  0612 23  0707 23  2005 23  0515 23  0405 23  0724   WBC 10*3/mm3 7.16  --  7.98 9.55 9.60  --  10.24 10.27 8.97 "   HEMOGLOBIN g/dL 9.6* 9.3* 6.9* 7.9* 7.9* 8.0* 6.5* 7.5* 8.3*   PLATELETS 10*3/mm3 144  --  145 155 164  --  182 209 238     Results from last 7 days   Lab Units 23  0556 23  0651 23  0612 23  0707 23  0515 23  1043 23  0405 23  1834 23  0724   SODIUM mmol/L 126* 127* 129* 129* 127*  --  131*  --  129*   POTASSIUM mmol/L 4.7 4.2 4.6 4.3 5.0 5.5* 5.1   < > 5.3*   CHLORIDE mmol/L 95* 96* 96* 95* 92*  --  98  --  96*   CO2 mmol/L 20.2* 22.0 21.0* 22.3 21.2*  --  21.8*  --  23.2   BUN mg/dL 25* 15 33* 20 54*  --  41*  --  27*   CREATININE mg/dL 4.73* 3.39* 5.37* 3.45* 6.37*  --  4.93*  --  3.38*   CALCIUM mg/dL 8.5* 8.4* 8.7 8.4* 8.7  --  8.7  --  8.8   GLUCOSE mg/dL 83 99 96 113* 81  --  84  --  89    < > = values in this interval not displayed.                 Scheduled Meds:   amLODIPine, 10 mg, Oral, Q24H  aspirin, 81 mg, Nasogastric, Daily  carvedilol, 25 mg, Nasogastric, Q12H  escitalopram, 10 mg, Nasogastric, Daily  hydroxychloroquine, 200 mg, Oral, Daily  Lacosamide, 100 mg, Intravenous, Q12H  lactulose, 10 g, Nasogastric, TID  lidocaine, 10 mL, Subcutaneous, Once  lisinopril, 10 mg, Oral, Q24H  mupirocin, , Each Nare, BID  mycophenolate, 250 mg, Oral, Q12H  ondansetron, 4 mg, Oral, TID AC  pantoprazole, 40 mg, Oral, QAM AC      PRN Meds:     acetaminophen **OR** acetaminophen **OR** acetaminophen    bisacodyl    bisacodyl    dextrose    dextrose    diphenhydrAMINE    droperidol **OR** droperidol    ePHEDrine    fentanyl    flumazenil    glucagon (human recombinant)    heparin (porcine)    hydrALAZINE    HYDROcodone-acetaminophen    HYDROmorphone    ipratropium-albuterol    labetalol    naloxone    [] Morphine **AND** naloxone    nitroglycerin    ondansetron    ondansetron    polyethylene glycol    promethazine **OR** promethazine  Continuous Infusions:       Assessment & Plan   Active Hospital Problems    Diagnosis  POA    **Dissecting ascending  aortic aneurysm [I71.010]  Yes    Internal jugular (IJ) vein thromboembolism, chronic [I82.C29]  Unknown    Recent cerebrovascular accident (CVA) [Z86.73]  Yes    Aortic valve lesion [I35.8]  Yes    Severe aortic valve regurgitation [I35.1]  Yes    Hyponatremia [E87.1]  Yes    Moderate malnutrition [E44.0]  Yes    Chronic diastolic CHF (congestive heart failure) [I50.32]  Yes    Anemia due to chronic kidney disease, on chronic dialysis [N18.6, D63.1, Z99.2]  Not Applicable    Essential hypertension [I10]  Yes    End stage renal disease on dialysis [N18.6, Z99.2]  Not Applicable    Seizure disorder [G40.909]  Yes    Elevated liver function tests [R79.89]  Yes    Systemic lupus erythematosus [M32.9]  Yes    Thrombocytopenia [D69.6]  Yes      Resolved Hospital Problems    Diagnosis Date Resolved POA    Abdominal pain [R10.9] 10/28/2023 Yes       Assessment & Plan    33 y.o. female admitted with Dissecting ascending aortic aneurysm.     Ascending aortic aneurysm with dissection  Cardiac tamponade  Severe aortic valve insufficiency  -11/2/2023 for dissection repair and replacement, 11/6/2023 for clot removal compressing RV with cardiac tamponade  -CT surgery continues to follow  -Repeat echo on 12/7 was unremarkable for any new or acute changes  -Cardiology has been following, signed off     Anemia  -Monitor hemoglobin status post 1 unit of packed red blood cells on 12/7  -No signs of active bleeding, Hgb has been stable for couple days  -Monitor with daily CBC, transfuse for Hgb less than 7  -Eliquis back on hold     Right IJ DVT, chronic  -Found during placement of tunneled dialysis catheter  -Discussed with Dr. Rodriguez who has spoken with vascular surgery notified them of the situation.  They recommend placing Eliquis on hold given drops of hemoglobin     HTN  -Continue Coreg 25 mg every 12 hours, continue amlodipine to 10 mg daily  -Management as per nephrology/cardiology  -Continue to closely monitor, titrate  medications as needed     ESRD  Hyponatremia, hyperkalemia  -Secondary to lupus nephritis, on peritoneal dialysis at home but patient has declined wanting to do PD anymore and has been agreeable to HD  -Nephrology following  -PD catheter now removed  -Continue HD via [facility] nephrology managing  -Fluid restriction to 1000 cc per 24 hours  -Monitor very closely with daily BMP     Lupus  -Continue CellCept and Plaquenil     History of CVA  -Right MCA stroke on admission, confirmed by MRI  -Continue ASA per neurology     Depression  -Continue Lexapro  -Access and psych Been following     Seizure disorder  -Continue lacosamide      Expected Discharge Date: 12/8/2023; Expected Discharge Time:      Bobby Carter MD  [facility]  12/08/23  11:24 EST

## 2023-12-08 NOTE — PROGRESS NOTES
" LOS: 43 days   Patient Care Team:  Margarita Woods APRN as PCP - General (Nurse Practitioner)  Winnie Sanchez MD as Referring Physician (Obstetrics and Gynecology)  Norberto Almaraz MD PhD as Consulting Physician (Hematology and Oncology)  Lupis Thomas MD as Consulting Physician (Nephrology)  Hair Mckeon MD as Consulting Physician (Nephrology)  Juana Taylor MD as Consulting Physician (Cardiology)    Chief Complaint: Post-op    Subjective:  Symptoms:  Stable.  No shortness of breath, cough or chest pain.    Diet:  Poor intake (Cortrak had to be removed--refusing to allow it to be replaced. Also refusing PEG placement.).  No nausea or vomiting.    Activity level: Impaired due to weakness.    Pain:  She complains of pain that is mild.  She reports pain is unchanged.  Pain is well controlled.  (Intermittent abdominal cramping).       Vital Signs  Temp:  [98.2 °F (36.8 °C)-99 °F (37.2 °C)] 98.2 °F (36.8 °C)  Heart Rate:  [73-89] 77  Resp:  [16-18] 16  BP: (117-149)/() 117/88  Body mass index is 18.33 kg/m².    Intake/Output Summary (Last 24 hours) at 12/8/2023 0703  Last data filed at 12/7/2023 2141  Gross per 24 hour   Intake 660 ml   Output --   Net 660 ml     No intake/output data recorded.          12/07/23  0604 12/07/23  1446 12/08/23  0600   Weight: 54.7 kg (120 lb 9.5 oz) 54.4 kg (120 lb) 48.4 kg (106 lb 12.8 oz)     Objective:  General Appearance:  Comfortable and in no acute distress.    Vital signs: (most recent): Blood pressure 138/97, pulse 76, temperature 98.6 °F (37 °C), temperature source Oral, resp. rate 16, height 162.6 cm (64\"), weight 48.4 kg (106 lb 12.8 oz), last menstrual period 08/10/2022, SpO2 99%, not currently breastfeeding.  Vital signs are normal.  No fever.    Output: No urine output and producing stool.    HEENT: (NG tube in place)    Lungs:  Normal effort and normal respiratory rate.  There are decreased breath sounds.    Heart: Normal rate.  Regular " "rhythm.  (SR on tele monitor)  Abdomen: Abdomen is soft and non-distended.  Bowel sounds are normal.     Pulses: Distal pulses are intact.    Neurological: Patient is oriented to person, place and time.  (drowsy).    Skin:  Warm and dry.  (Sternal wound healing well without erythema or drainage)          Results Review:        WBC WBC   Date Value Ref Range Status   12/08/2023 7.16 3.40 - 10.80 10*3/mm3 Final   12/07/2023 7.98 3.40 - 10.80 10*3/mm3 Final   12/06/2023 9.55 3.40 - 10.80 10*3/mm3 Final   12/05/2023 9.60 3.40 - 10.80 10*3/mm3 Final      HGB Hemoglobin   Date Value Ref Range Status   12/08/2023 9.6 (L) 12.0 - 15.9 g/dL Final   12/07/2023 9.3 (L) 12.0 - 15.9 g/dL Final   12/07/2023 6.9 (C) 12.0 - 15.9 g/dL Final   12/06/2023 7.9 (L) 12.0 - 15.9 g/dL Final   12/05/2023 7.9 (L) 12.0 - 15.9 g/dL Final      HCT Hematocrit   Date Value Ref Range Status   12/08/2023 29.7 (L) 34.0 - 46.6 % Final   12/07/2023 27.9 (L) 34.0 - 46.6 % Final   12/07/2023 20.6 (C) 34.0 - 46.6 % Final   12/06/2023 23.9 (L) 34.0 - 46.6 % Final   12/05/2023 24.7 (L) 34.0 - 46.6 % Final      Platelets Platelets   Date Value Ref Range Status   12/08/2023 144 140 - 450 10*3/mm3 Final   12/07/2023 145 140 - 450 10*3/mm3 Final   12/06/2023 155 140 - 450 10*3/mm3 Final   12/05/2023 164 140 - 450 10*3/mm3 Final        PT/INR:  No results found for: \"PROTIME\"/No results found for: \"INR\"    Sodium Sodium   Date Value Ref Range Status   12/08/2023 126 (L) 136 - 145 mmol/L Final   12/07/2023 127 (L) 136 - 145 mmol/L Final   12/06/2023 129 (L) 136 - 145 mmol/L Final   12/05/2023 129 (L) 136 - 145 mmol/L Final      Potassium Potassium   Date Value Ref Range Status   12/08/2023 4.7 3.5 - 5.2 mmol/L Final   12/07/2023 4.2 3.5 - 5.2 mmol/L Final   12/06/2023 4.6 3.5 - 5.2 mmol/L Final     Comment:     Slight hemolysis detected by analyzer. Result may be falsely elevated.   12/05/2023 4.3 3.5 - 5.2 mmol/L Final      Chloride Chloride   Date Value Ref " Range Status   12/08/2023 95 (L) 98 - 107 mmol/L Final   12/07/2023 96 (L) 98 - 107 mmol/L Final   12/06/2023 96 (L) 98 - 107 mmol/L Final   12/05/2023 95 (L) 98 - 107 mmol/L Final      Bicarbonate CO2   Date Value Ref Range Status   12/08/2023 20.2 (L) 22.0 - 29.0 mmol/L Final   12/07/2023 22.0 22.0 - 29.0 mmol/L Final   12/06/2023 21.0 (L) 22.0 - 29.0 mmol/L Final   12/05/2023 22.3 22.0 - 29.0 mmol/L Final      BUN BUN   Date Value Ref Range Status   12/08/2023 25 (H) 6 - 20 mg/dL Final   12/07/2023 15 6 - 20 mg/dL Final   12/06/2023 33 (H) 6 - 20 mg/dL Final   12/05/2023 20 6 - 20 mg/dL Final      Creatinine Creatinine   Date Value Ref Range Status   12/08/2023 4.73 (H) 0.57 - 1.00 mg/dL Final   12/07/2023 3.39 (H) 0.57 - 1.00 mg/dL Final   12/06/2023 5.37 (H) 0.57 - 1.00 mg/dL Final   12/05/2023 3.45 (H) 0.57 - 1.00 mg/dL Final      Calcium Calcium   Date Value Ref Range Status   12/08/2023 8.5 (L) 8.6 - 10.5 mg/dL Final   12/07/2023 8.4 (L) 8.6 - 10.5 mg/dL Final   12/06/2023 8.7 8.6 - 10.5 mg/dL Final   12/05/2023 8.4 (L) 8.6 - 10.5 mg/dL Final      Magnesium Magnesium   Date Value Ref Range Status   12/07/2023 1.7 1.6 - 2.6 mg/dL Final            amLODIPine, 10 mg, Oral, Q24H  aspirin, 81 mg, Nasogastric, Daily  carvedilol, 25 mg, Nasogastric, Q12H  escitalopram, 10 mg, Nasogastric, Daily  hydroxychloroquine, 200 mg, Oral, Daily  Lacosamide, 100 mg, Intravenous, Q12H  lactulose, 10 g, Nasogastric, TID  lidocaine, 10 mL, Subcutaneous, Once  lisinopril, 10 mg, Oral, Q24H  mupirocin, , Each Nare, BID  mycophenolate, 250 mg, Oral, Q12H  ondansetron, 4 mg, Oral, TID AC  pantoprazole, 40 mg, Oral, QAM AC           Assessment & Plan    - Ascending aortic aneurysm with dissection- s/p ascending aortic dissection repair/proximal replacement, gacron interposition graft, kelli procedure; insertion of right femoral shiley---Dr. Medina  - Cardiac tamponade- reexploration for bleeding, removal of large clot compressing the  RV--Dr. Lowe  - severe aortic valve insufficiency  - Encephalopathy, improving   - ESRD on PD  - Lupus--Cellcept/plaquenil restarted 11/23 and patient seems to be improving  - Epilepsy  - chronic immunosuppression  - hypertension  - chronic anemia--stable  - TCP--chronic; improving  - Depression--started on lexapro 11/14  - right MCA CVA--continue ASA per neuro  - hyponatremia--hypervolemic, improving--nephrology following  - malnutrition r/t decreased caloric intake--nutrition following  - hypokalemia    Anemia stable s/p transfusion yesterday. CXR and echo reviewed. She remains stable from a CV surgery standpoint. OK to d/c over the weekend. If she is still here we will see on Monday.     Shea Monteiro, APRN  12/08/23  07:03 EST

## 2023-12-08 NOTE — NURSING NOTE
Dialysis complete, removed 1.5 liters with this treatment and no complications noted.  Pre and post vitals are in epic.

## 2023-12-08 NOTE — CASE MANAGEMENT/SOCIAL WORK
Continued Stay Note  [facility]     Patient Name: Dee Herrera  MRN: [id number]  Today's Date: 12/8/2023    Admit Date: 10/26/2023    Plan: [facility]- bed available over weekend with onsite HD starting Monday, 12/11   Discharge Plan       Row Name 12/08/23 1321       Plan    Plan [facility]- bed available over weekend with onsite HD starting Monday, 12/11    Patient/Family in Agreement with Plan yes    Plan Comments CCP confirmed with Audra/Yudi Greene, that patient has a bed over the weekend and HD chair time starts Monday 12/11. CCP met with patient and patient's mother via phone. Mother agreeable to plan, as states she cannot care for patient's needs at home. Patient agreeable to rehab, but remains interested in home as option if able. OP HD arrangements have been initiated but on hold, as patient is discharging to SNF from the hospital. CCP to call [facility] if patient discharges over the weekend. Usama LOUIS RN CCP                   Discharge Codes    No documentation.                 Expected Discharge Date and Time       Expected Discharge Date Expected Discharge Time    Dec 8, 2023               Usama Pedro RN

## 2023-12-08 NOTE — PLAN OF CARE
Goal Outcome Evaluation:  Plan of Care Reviewed With: patient        Progress: no change  Outcome Evaluation: Complaints of pain treated with PRN tylenol. Had gas station snacks (chips, candies) delivered to room around 1am. VSS. Refused cellcept

## 2023-12-08 NOTE — SIGNIFICANT NOTE
12/08/23 1300   OTHER   Discipline physical therapy assistant   Rehab Time/Intention   Session Not Performed patient/family declined treatment;patient unavailable for treatment  (Pt declined PT earlier PM and now ANDREIA to dialysis. PT will follow up 12/11.)   Recommendation   PT - Next Appointment 12/09/23

## 2023-12-09 VITALS
TEMPERATURE: 98.4 F | DIASTOLIC BLOOD PRESSURE: 87 MMHG | WEIGHT: 104.2 LBS | OXYGEN SATURATION: 98 % | HEART RATE: 78 BPM | BODY MASS INDEX: 17.79 KG/M2 | SYSTOLIC BLOOD PRESSURE: 131 MMHG | HEIGHT: 64 IN | RESPIRATION RATE: 18 BRPM

## 2023-12-09 PROBLEM — I50.31 ACUTE HEART FAILURE WITH PRESERVED EJECTION FRACTION (HFPEF): Status: ACTIVE | Noted: 2023-12-09

## 2023-12-09 LAB
ALBUMIN SERPL-MCNC: 2 G/DL (ref 3.5–5.2)
ANION GAP SERPL CALCULATED.3IONS-SCNC: 8.3 MMOL/L (ref 5–15)
BASOPHILS # BLD AUTO: 0.02 10*3/MM3 (ref 0–0.2)
BASOPHILS NFR BLD AUTO: 0.3 % (ref 0–1.5)
BUN SERPL-MCNC: 11 MG/DL (ref 6–20)
BUN/CREAT SERPL: 3.9 (ref 7–25)
CALCIUM SPEC-SCNC: 8.4 MG/DL (ref 8.6–10.5)
CHLORIDE SERPL-SCNC: 95 MMOL/L (ref 98–107)
CO2 SERPL-SCNC: 25.7 MMOL/L (ref 22–29)
CREAT SERPL-MCNC: 2.83 MG/DL (ref 0.57–1)
DEPRECATED RDW RBC AUTO: 46 FL (ref 37–54)
EGFRCR SERPLBLD CKD-EPI 2021: 22.2 ML/MIN/1.73
EOSINOPHIL # BLD AUTO: 0.07 10*3/MM3 (ref 0–0.4)
EOSINOPHIL NFR BLD AUTO: 0.9 % (ref 0.3–6.2)
ERYTHROCYTE [DISTWIDTH] IN BLOOD BY AUTOMATED COUNT: 14.3 % (ref 12.3–15.4)
GLUCOSE SERPL-MCNC: 99 MG/DL (ref 65–99)
HCT VFR BLD AUTO: 29.8 % (ref 34–46.6)
HGB BLD-MCNC: 9.7 G/DL (ref 12–15.9)
IMM GRANULOCYTES # BLD AUTO: 0.04 10*3/MM3 (ref 0–0.05)
IMM GRANULOCYTES NFR BLD AUTO: 0.5 % (ref 0–0.5)
LYMPHOCYTES # BLD AUTO: 0.49 10*3/MM3 (ref 0.7–3.1)
LYMPHOCYTES NFR BLD AUTO: 6.5 % (ref 19.6–45.3)
MCH RBC QN AUTO: 28.4 PG (ref 26.6–33)
MCHC RBC AUTO-ENTMCNC: 32.6 G/DL (ref 31.5–35.7)
MCV RBC AUTO: 87.1 FL (ref 79–97)
MONOCYTES # BLD AUTO: 0.75 10*3/MM3 (ref 0.1–0.9)
MONOCYTES NFR BLD AUTO: 10 % (ref 5–12)
NEUTROPHILS NFR BLD AUTO: 6.14 10*3/MM3 (ref 1.7–7)
NEUTROPHILS NFR BLD AUTO: 81.8 % (ref 42.7–76)
NRBC BLD AUTO-RTO: 0 /100 WBC (ref 0–0.2)
PHOSPHATE SERPL-MCNC: 2.7 MG/DL (ref 2.5–4.5)
PLATELET # BLD AUTO: 152 10*3/MM3 (ref 140–450)
PMV BLD AUTO: 11.2 FL (ref 6–12)
POTASSIUM SERPL-SCNC: 4.2 MMOL/L (ref 3.5–5.2)
RBC # BLD AUTO: 3.42 10*6/MM3 (ref 3.77–5.28)
SODIUM SERPL-SCNC: 129 MMOL/L (ref 136–145)
WBC NRBC COR # BLD AUTO: 7.51 10*3/MM3 (ref 3.4–10.8)

## 2023-12-09 PROCEDURE — 94799 UNLISTED PULMONARY SVC/PX: CPT

## 2023-12-09 PROCEDURE — 63710000001 ONDANSETRON PER 8 MG: Performed by: SURGERY

## 2023-12-09 PROCEDURE — 63710000001 MYCOPHENOLATE MOFETIL PER 250 MG: Performed by: SURGERY

## 2023-12-09 PROCEDURE — 80069 RENAL FUNCTION PANEL: CPT | Performed by: INTERNAL MEDICINE

## 2023-12-09 PROCEDURE — 85025 COMPLETE CBC W/AUTO DIFF WBC: CPT | Performed by: INTERNAL MEDICINE

## 2023-12-09 RX ORDER — PANTOPRAZOLE SODIUM 40 MG/1
40 TABLET, DELAYED RELEASE ORAL
Start: 2023-12-10

## 2023-12-09 RX ORDER — ESCITALOPRAM OXALATE 10 MG/1
10 TABLET ORAL DAILY
Start: 2023-12-10

## 2023-12-09 RX ORDER — LACOSAMIDE 100 MG/1
100 TABLET ORAL EVERY 12 HOURS SCHEDULED
Qty: 6 TABLET | Refills: 0 | Status: SHIPPED | OUTPATIENT
Start: 2023-12-09

## 2023-12-09 RX ORDER — AMLODIPINE BESYLATE 10 MG/1
10 TABLET ORAL
Start: 2023-12-10

## 2023-12-09 RX ORDER — LISINOPRIL 10 MG/1
10 TABLET ORAL
Start: 2023-12-10

## 2023-12-09 RX ORDER — LACOSAMIDE 100 MG/1
100 TABLET ORAL EVERY 12 HOURS SCHEDULED
Start: 2023-12-09 | End: 2023-12-09 | Stop reason: SDUPTHER

## 2023-12-09 RX ORDER — ASPIRIN 81 MG/1
81 TABLET, CHEWABLE ORAL DAILY
Start: 2023-12-10

## 2023-12-09 RX ADMIN — CARVEDILOL 25 MG: 25 TABLET, FILM COATED ORAL at 08:20

## 2023-12-09 RX ADMIN — PANTOPRAZOLE SODIUM 40 MG: 40 TABLET, DELAYED RELEASE ORAL at 08:21

## 2023-12-09 RX ADMIN — AMLODIPINE BESYLATE 10 MG: 10 TABLET ORAL at 08:20

## 2023-12-09 RX ADMIN — MUPIROCIN: 20 OINTMENT TOPICAL at 08:22

## 2023-12-09 RX ADMIN — LISINOPRIL 10 MG: 10 TABLET ORAL at 08:20

## 2023-12-09 RX ADMIN — LACOSAMIDE 100 MG: 100 TABLET, FILM COATED ORAL at 08:20

## 2023-12-09 RX ADMIN — ESCITALOPRAM OXALATE 10 MG: 10 TABLET, FILM COATED ORAL at 08:20

## 2023-12-09 RX ADMIN — ONDANSETRON HYDROCHLORIDE 4 MG: 4 TABLET, FILM COATED ORAL at 08:20

## 2023-12-09 RX ADMIN — ASPIRIN 81 MG: 81 TABLET, CHEWABLE ORAL at 08:20

## 2023-12-09 RX ADMIN — IPRATROPIUM BROMIDE AND ALBUTEROL SULFATE 3 ML: 2.5; .5 SOLUTION RESPIRATORY (INHALATION) at 01:57

## 2023-12-09 RX ADMIN — HYDROXYCHLOROQUINE SULFATE 200 MG: 200 TABLET ORAL at 08:20

## 2023-12-09 NOTE — PLAN OF CARE
Goal Outcome Evaluation:  Plan of Care Reviewed With: patient        Progress: no change  Outcome Evaluation: No complaints of pain overnight.Incontinence care provided. Possible DC to snf over the weekend. Refused cellcept

## 2023-12-09 NOTE — CASE MANAGEMENT/SOCIAL WORK
Continued Stay Note  [facility]     Patient Name: Dee Herrera  MRN: [id number]  Today's Date: 12/9/2023    Admit Date: 10/26/2023    Plan: SNF at [facility] today at 1430 by Felipe gama   Discharge Plan       Row Name 12/09/23 1103       Plan    Plan SNF at [facility] today at 1430 by Felipe gama    Patient/Family in Agreement with Plan yes    Plan Comments Inbound call from staff RN who stated pt is DC'ing today and needs  transport to SNF. Called and spoke with Jesus and arranged for a 1430  today. Attempted to call in pt's room to update her and there was no answer. Called and updated staff RN.....JW                   Discharge Codes    No documentation.                 Expected Discharge Date and Time       Expected Discharge Date Expected Discharge Time    Dec 9, 2023               Alley Hardwick, RN

## 2023-12-09 NOTE — DISCHARGE SUMMARY
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]  Yes    Recent cerebrovascular accident (CVA) [Z86.73]  Yes    Aortic valve lesion [I35.8]  Yes    Severe aortic valve regurgitation [I35.1]  Yes    Hyponatremia [E87.1]  Yes    Moderate malnutrition [E44.0]  Yes    Chronic diastolic CHF (congestive heart failure) [I50.32]  Yes    Anemia due to chronic kidney disease, on chronic dialysis [N18.6, D63.1, Z99.2]  Not Applicable    Essential hypertension [I10]  Yes    End stage renal disease on dialysis [N18.6, Z99.2]  Not Applicable    Seizure disorder [G40.909]  Yes    Elevated liver function tests [R79.89]  Yes    Systemic lupus erythematosus [M32.9]  Yes    Thrombocytopenia [D69.6]  Yes      Resolved Hospital Problems    Diagnosis Date Resolved POA    Abdominal pain [R10.9] 10/28/2023 Yes       DETAILS OF HOSPITAL STAY     Hospital Course  This is a 33-year-old female with history of lupus, end-stage renal disease on peritoneal dialysis prior to presentation, and failure to thrive with malnutrition who presented the hospital with shortness of breath and severe hyponatremia.  Please see H&P for full details of her admission.  Hyponatremia was initially addressed by nephrology.  Her PD was placed on hold and she was given IV fluids.  Cardiology evaluated for the shortness of air and echocardiogram was obtained which was initially concerning for severe AI however follow-up CHETNA found aortic dissection.  Cardiovascular surgery was consulted and she underwent aortic dissection repair with graft placement on 11/2/2023.  Her postoperative course was complicated by cardiac tamponade and she required sternal reexploration and evacuation of clot that was found in front and around  the heart on 11/6/2023.  From there she had a prolonged period of significant interpersonal withdrawal and what appeared to be depression.  She had extremely limited interaction with medical providers.  Her oral intake dropped off considerably requiring placement of cortrak placement and tube feedings.  Surgical services, nephrology, cardiology, and psychiatry continue to follow.  After multiple weeks of this she eventually has shown significant improvement in her mentation.  She is now awake and interactive.  Her oral intake has improved and she is now off of tube feedings.  Her peritoneal dialysis has been discontinued in favor of hemodialysis and tunneled dialysis catheter is in place.  At the timing of placement of this she was found to have a right IJ DVT which was subacute to chronic in nature.  She was started on Eliquis but has not tolerated this very well with dropping of her hemoglobin.  No signs of active bleeding at this time.  This was discussed with the vascular surgery service and they recommend she remain off of Eliquis for now.  She is hemodynamically stable.  Towards the end of her hospital stay she had a follow-up echocardiogram which does not reveal any acute issues.  All consulting services have now signed off.  She continues on hemodialysis on a Monday, Wednesday, Friday schedule.  At this point she is medically stable.  I recommend that she have a repeat hemoglobin drawn in 3 days and if this looks stable/improved then she reinitiate on Eliquis.  Hemoglobin as of today is 9.7.  The patient has been approved for subacute rehab with onsite hemodialysis.  She will be discharged today.    Physical Exam at Discharge:  General: No acute distress, AAOx3, weak and deconditioned appearing  HEENT: EOMI, PERRL  Cardiovascular: +s1 and s2, RRR  Lungs: No rhonchi or wheezing  Abdomen: soft, nontender    Consults:   Consults       Date and Time Order Name Status Description    11/30/2023  1:11 PM Inpatient  Gastroenterology Consult Completed     11/30/2023  8:08 AM Inpatient General Surgery Consult Completed     11/28/2023  9:15 AM Inpatient Vascular Surgery Consult Completed     11/20/2023  9:02 AM Inpatient Psychiatrist Consult Completed     11/20/2023  7:48 AM Inpatient Vascular Surgery Consult Completed     11/19/2023 10:37 AM Inpatient General Surgery Consult Completed     11/12/2023 10:38 AM Inpatient Infectious Diseases Consult Completed     11/11/2023 10:22 AM Inpatient Vascular Surgery Consult Completed     11/4/2023  6:37 AM Inpatient Pulmonology Consult Completed     11/3/2023 11:09 AM Inpatient Neurosurgery Consult Completed     11/3/2023  6:02 AM Inpatient Neurology Consult Other (see comments) (Seizures) Completed     10/26/2023 12:08 PM Inpatient Cardiology Consult Completed     10/26/2023  4:34 AM Inpatient Nephrology Consult Completed               Condition on Discharge: Stable    Discharge Disposition  Skilled Nursing Facility ([place] - External)    Discharge Medications     Discharge Medications        New Medications        Instructions Start Date   amLODIPine 10 MG tablet  Commonly known as: NORVASC   10 mg, Oral, Every 24 Hours Scheduled   Start Date: December 10, 2023     aspirin 81 MG chewable tablet   81 mg, Oral, Daily   Start Date: December 10, 2023     escitalopram 10 MG tablet  Commonly known as: LEXAPRO   10 mg, Oral, Daily   Start Date: December 10, 2023     lacosamide 100 MG tablet tablet  Commonly known as: VIMPAT   100 mg, Oral, Every 12 Hours Scheduled      lisinopril 10 MG tablet  Commonly known as: PRINIVIL,ZESTRIL   10 mg, Oral, Every 24 Hours Scheduled   Start Date: December 10, 2023     pantoprazole 40 MG EC tablet  Commonly known as: PROTONIX   40 mg, Oral, Every Morning Before Breakfast   Start Date: December 10, 2023     polyethylene glycol 17 GM/SCOOP powder  Commonly known as: MIRALAX   17 g, Oral, As Needed             Continue These Medications        Instructions Start Date    carvedilol 25 MG tablet  Commonly known as: COREG   25 mg, Oral, Every 12 Hours Scheduled      hydroxychloroquine 200 MG tablet  Commonly known as: PLAQUENIL   200 mg, Oral, Daily      mycophenolate 500 MG tablet  Commonly known as: CELLCEPT   250 mg, Oral, Every 12 Hours Scheduled      ondansetron 4 MG tablet  Commonly known as: Zofran   4 mg, Oral, Every 8 Hours PRN             Stop These Medications      famotidine 20 MG tablet  Commonly known as: PEPCID     NIFEdipine XL 90 MG 24 hr tablet  Commonly known as: PROCARDIA XL     potassium chloride 10 MEQ CR tablet     predniSONE 10 MG tablet  Commonly known as: DELTASONE     Senexon-S 8.6-50 MG per tablet  Generic drug: sennosides-docusate     sevelamer 800 MG tablet  Commonly known as: RENVELA     simethicone 80 MG chewable tablet  Commonly known as: MYLICON              Discharge Diet:   Diet Instructions       Diet: Regular/House Diet; Regular Texture (IDDSI 7); Thin (IDDSI 0)      Discharge Diet: Regular/House Diet    Texture: Regular Texture (IDDSI 7)    Fluid Consistency: Thin (IDDSI 0)            Activity at Discharge:   Activity Instructions       Activity as Tolerated              Follow-up Appointments  No future appointments.  Additional Instructions for the Follow-ups that You Need to Schedule       Ambulatory Referral to Cardiac Rehab   As directed      Discharge Follow-up with PCP   As directed       Currently Documented PCP:    Margarita Woods APRN    PCP Phone Number:    [phone]     Follow Up Details: 1-2 weeks after rehab        Discharge Follow-up with Specialty: Nephrology as per M/W/F dialysis schedule   As directed      Specialty: Nephrology as per M/W/F dialysis schedule                I have examined and discussed discharge planning with the patient today.    I wore full protective equipment throughout the patient encounter including eye protection and facemask.  Hand hygiene was performed before donning protective equipment and  after removal when leaving the room.     Bobby Carter MD  12/09/23  10:48 EST    Time: Discharge greater than 30 min

## 2023-12-09 NOTE — CASE MANAGEMENT/SOCIAL WORK
Case Management Discharge Note      Final Note: dc to snf    Provided Post Acute Provider List?: N/A  N/A Provider List Comment: Denies any needs or services  Provided Post Acute Provider Quality & Resource List?: N/A    Selected Continued Care - Discharged on 12/9/2023 Admission date: 10/26/2023 - Discharge disposition: Skilled Nursing Facility ([place] - External)      Destination Coordination complete.      Service Provider Selected Services Address Phone Fax Patient Preferred    [facility] [facility] [address] [phone] --              Durable Medical Equipment    No services have been selected for the patient.                Dialysis/Infusion    No services have been selected for the patient.                Home Medical Care    No services have been selected for the patient.                Therapy    No services have been selected for the patient.                Community Resources    No services have been selected for the patient.                Community & DME    No services have been selected for the patient.                    Transportation Services  W/C Van: [facility] (scheduled for 1430 (reservation# [id number]))    Final Discharge Disposition Code: 03 - skilled nursing facility (SNF)

## 2023-12-22 ENCOUNTER — TELEPHONE (OUTPATIENT)
Dept: FAMILY MEDICINE CLINIC | Facility: CLINIC | Age: 33
End: 2023-12-22

## 2023-12-22 DIAGNOSIS — I71.00 DISSECTION OF AORTA, UNSPECIFIED PORTION OF AORTA: Primary | ICD-10-CM

## 2023-12-22 NOTE — TELEPHONE ENCOUNTER
"  Caller: DANYELL TRNIIDAD \"Vaughn\" ([facility])    Relationship: Mother    Best call back number: [phone]    What is the best time to reach you: ANY    Who are you requesting to speak with (clinical staff, provider,  specific staff member): GALO ABRAHAM    Do you know the name of the person who called: DANYELL    What was the call regarding: PATIENTS MOTHER DANYELL CALLED AND WANTED TO LET US KNOW PATIENT HAS BEEN IN HOSPITAL FOR 1.5 MONTHS AT [facility] AND THEN WAS SENT TO REHAB FOR 2 OR 3 WEEKS. PATIENT IS SUPPOSED TO BE RELEASED TODAY.    PATIENT HAD SURGERY, AND HAD A SMALL STROKE WHICH RESORTED IN LIGHT MEMORY LOSS ALSO SHE HAD 2 SEIZURES.    PATIENTS MOTHER WANTED TO SEE IF ORDERS CAN BE PUT IN FOR ADMEDYSIS TO COME AND DO HOME HEALTH.  2 TIMES A WEEK.    PLEASE CALL IF YOU HAVE ANY FURTHER QUESTIONS OR NEED FURTHER INFORMATION            "

## 2024-01-06 ENCOUNTER — HOSPITAL ENCOUNTER (OUTPATIENT)
Facility: HOSPITAL | Age: 34
Setting detail: OBSERVATION
Discharge: HOME | End: 2024-01-07
Attending: EMERGENCY MEDICINE | Admitting: INTERNAL MEDICINE
Payer: MEDICARE

## 2024-01-06 DIAGNOSIS — D64.9 ACUTE ON CHRONIC ANEMIA: Primary | ICD-10-CM

## 2024-01-06 DIAGNOSIS — Z99.2 ESRD ON DIALYSIS: ICD-10-CM

## 2024-01-06 DIAGNOSIS — N18.6 ESRD ON DIALYSIS: ICD-10-CM

## 2024-01-06 LAB
ABO GROUP BLD: NORMAL
ALBUMIN SERPL-MCNC: 2.9 G/DL (ref 3.5–5.2)
ALBUMIN/GLOB SERPL: 0.8 G/DL
ALP SERPL-CCNC: 83 U/L (ref 39–117)
ALT SERPL W P-5'-P-CCNC: 7 U/L (ref 1–33)
ANION GAP SERPL CALCULATED.3IONS-SCNC: 10 MMOL/L (ref 5–15)
APTT PPP: 29.2 SECONDS (ref 22.7–35.4)
AST SERPL-CCNC: 18 U/L (ref 1–32)
BASOPHILS # BLD AUTO: 0.02 10*3/MM3 (ref 0–0.2)
BASOPHILS NFR BLD AUTO: 0.3 % (ref 0–1.5)
BILIRUB SERPL-MCNC: 0.3 MG/DL (ref 0–1.2)
BLD GP AB SCN SERPL QL: NEGATIVE
BUN SERPL-MCNC: 24 MG/DL (ref 6–20)
BUN/CREAT SERPL: 4.3 (ref 7–25)
CALCIUM SPEC-SCNC: 8.6 MG/DL (ref 8.6–10.5)
CHLORIDE SERPL-SCNC: 94 MMOL/L (ref 98–107)
CO2 SERPL-SCNC: 28 MMOL/L (ref 22–29)
CREAT SERPL-MCNC: 5.52 MG/DL (ref 0.57–1)
DEPRECATED RDW RBC AUTO: 45.2 FL (ref 37–54)
EGFRCR SERPLBLD CKD-EPI 2021: 10 ML/MIN/1.73
EOSINOPHIL # BLD AUTO: 0.07 10*3/MM3 (ref 0–0.4)
EOSINOPHIL NFR BLD AUTO: 1.1 % (ref 0.3–6.2)
ERYTHROCYTE [DISTWIDTH] IN BLOOD BY AUTOMATED COUNT: 15.1 % (ref 12.3–15.4)
FERRITIN SERPL-MCNC: 2702 NG/ML (ref 13–150)
FOLATE SERPL-MCNC: 2.9 NG/ML (ref 4.78–24.2)
GLOBULIN UR ELPH-MCNC: 3.5 GM/DL
GLUCOSE SERPL-MCNC: 93 MG/DL (ref 65–99)
HCT VFR BLD AUTO: 18.6 % (ref 34–46.6)
HGB BLD-MCNC: 6 G/DL (ref 12–15.9)
IMM GRANULOCYTES # BLD AUTO: 0.02 10*3/MM3 (ref 0–0.05)
IMM GRANULOCYTES NFR BLD AUTO: 0.3 % (ref 0–0.5)
INR PPP: 1.09 (ref 0.9–1.1)
IRON 24H UR-MRATE: 26 MCG/DL (ref 37–145)
IRON 24H UR-MRATE: 26 MCG/DL (ref 37–145)
IRON SATN MFR SERPL: 14 % (ref 20–50)
LYMPHOCYTES # BLD AUTO: 0.6 10*3/MM3 (ref 0.7–3.1)
LYMPHOCYTES NFR BLD AUTO: 9.8 % (ref 19.6–45.3)
MCH RBC QN AUTO: 26.8 PG (ref 26.6–33)
MCHC RBC AUTO-ENTMCNC: 32.3 G/DL (ref 31.5–35.7)
MCV RBC AUTO: 83 FL (ref 79–97)
MONOCYTES # BLD AUTO: 0.41 10*3/MM3 (ref 0.1–0.9)
MONOCYTES NFR BLD AUTO: 6.7 % (ref 5–12)
NEUTROPHILS NFR BLD AUTO: 4.98 10*3/MM3 (ref 1.7–7)
NEUTROPHILS NFR BLD AUTO: 81.8 % (ref 42.7–76)
NRBC BLD AUTO-RTO: 0 /100 WBC (ref 0–0.2)
PLATELET # BLD AUTO: 204 10*3/MM3 (ref 140–450)
PMV BLD AUTO: 9.8 FL (ref 6–12)
POTASSIUM SERPL-SCNC: 4.1 MMOL/L (ref 3.5–5.2)
PROT SERPL-MCNC: 6.4 G/DL (ref 6–8.5)
PROTHROMBIN TIME: 14.2 SECONDS (ref 11.7–14.2)
RBC # BLD AUTO: 2.24 10*6/MM3 (ref 3.77–5.28)
RETICS # AUTO: 0.03 10*6/MM3 (ref 0.02–0.13)
RETICS/RBC NFR AUTO: 1.07 % (ref 0.7–1.9)
RH BLD: POSITIVE
SODIUM SERPL-SCNC: 132 MMOL/L (ref 136–145)
T&S EXPIRATION DATE: NORMAL
TIBC SERPL-MCNC: 185 MCG/DL (ref 298–536)
TRANSFERRIN SERPL-MCNC: 124 MG/DL (ref 200–360)
VIT B12 BLD-MCNC: 694 PG/ML (ref 211–946)
WBC NRBC COR # BLD AUTO: 6.1 10*3/MM3 (ref 3.4–10.8)

## 2024-01-06 PROCEDURE — G0378 HOSPITAL OBSERVATION PER HR: HCPCS

## 2024-01-06 PROCEDURE — 83540 ASSAY OF IRON: CPT | Performed by: INTERNAL MEDICINE

## 2024-01-06 PROCEDURE — 63710000001 MYCOPHENOLATE MOFETIL PER 250 MG: Performed by: INTERNAL MEDICINE

## 2024-01-06 PROCEDURE — G0257 UNSCHED DIALYSIS ESRD PT HOS: HCPCS

## 2024-01-06 PROCEDURE — 82607 VITAMIN B-12: CPT | Performed by: INTERNAL MEDICINE

## 2024-01-06 PROCEDURE — 85610 PROTHROMBIN TIME: CPT | Performed by: EMERGENCY MEDICINE

## 2024-01-06 PROCEDURE — 85730 THROMBOPLASTIN TIME PARTIAL: CPT | Performed by: EMERGENCY MEDICINE

## 2024-01-06 PROCEDURE — 99284 EMERGENCY DEPT VISIT MOD MDM: CPT

## 2024-01-06 PROCEDURE — 86900 BLOOD TYPING SEROLOGIC ABO: CPT | Performed by: EMERGENCY MEDICINE

## 2024-01-06 PROCEDURE — 86923 COMPATIBILITY TEST ELECTRIC: CPT

## 2024-01-06 PROCEDURE — 86900 BLOOD TYPING SEROLOGIC ABO: CPT

## 2024-01-06 PROCEDURE — 85045 AUTOMATED RETICULOCYTE COUNT: CPT | Performed by: INTERNAL MEDICINE

## 2024-01-06 PROCEDURE — 85025 COMPLETE CBC W/AUTO DIFF WBC: CPT | Performed by: EMERGENCY MEDICINE

## 2024-01-06 PROCEDURE — 82746 ASSAY OF FOLIC ACID SERUM: CPT | Performed by: INTERNAL MEDICINE

## 2024-01-06 PROCEDURE — 80053 COMPREHEN METABOLIC PANEL: CPT | Performed by: EMERGENCY MEDICINE

## 2024-01-06 PROCEDURE — 36415 COLL VENOUS BLD VENIPUNCTURE: CPT

## 2024-01-06 PROCEDURE — P9016 RBC LEUKOCYTES REDUCED: HCPCS

## 2024-01-06 PROCEDURE — 86901 BLOOD TYPING SEROLOGIC RH(D): CPT | Performed by: EMERGENCY MEDICINE

## 2024-01-06 PROCEDURE — 84466 ASSAY OF TRANSFERRIN: CPT | Performed by: INTERNAL MEDICINE

## 2024-01-06 PROCEDURE — 86850 RBC ANTIBODY SCREEN: CPT | Performed by: EMERGENCY MEDICINE

## 2024-01-06 PROCEDURE — 82728 ASSAY OF FERRITIN: CPT | Performed by: INTERNAL MEDICINE

## 2024-01-06 RX ORDER — AMOXICILLIN 250 MG
2 CAPSULE ORAL 2 TIMES DAILY
Status: DISCONTINUED | OUTPATIENT
Start: 2024-01-06 | End: 2024-01-07 | Stop reason: HOSPADM

## 2024-01-06 RX ORDER — CARVEDILOL 25 MG/1
25 TABLET ORAL EVERY 12 HOURS SCHEDULED
Status: DISCONTINUED | OUTPATIENT
Start: 2024-01-06 | End: 2024-01-07 | Stop reason: HOSPADM

## 2024-01-06 RX ORDER — ESCITALOPRAM OXALATE 10 MG/1
10 TABLET ORAL DAILY
Status: DISCONTINUED | OUTPATIENT
Start: 2024-01-06 | End: 2024-01-07 | Stop reason: HOSPADM

## 2024-01-06 RX ORDER — LISINOPRIL 10 MG/1
10 TABLET ORAL
Status: DISCONTINUED | OUTPATIENT
Start: 2024-01-06 | End: 2024-01-07 | Stop reason: HOSPADM

## 2024-01-06 RX ORDER — ONDANSETRON 4 MG/1
4 TABLET, FILM COATED ORAL EVERY 8 HOURS PRN
Status: DISCONTINUED | OUTPATIENT
Start: 2024-01-06 | End: 2024-01-07 | Stop reason: HOSPADM

## 2024-01-06 RX ORDER — SODIUM CHLORIDE 0.9 % (FLUSH) 0.9 %
10 SYRINGE (ML) INJECTION AS NEEDED
Status: DISCONTINUED | OUTPATIENT
Start: 2024-01-06 | End: 2024-01-07 | Stop reason: HOSPADM

## 2024-01-06 RX ORDER — ACETAMINOPHEN 325 MG/1
650 TABLET ORAL EVERY 4 HOURS PRN
Status: DISCONTINUED | OUTPATIENT
Start: 2024-01-06 | End: 2024-01-07 | Stop reason: HOSPADM

## 2024-01-06 RX ORDER — PANTOPRAZOLE SODIUM 40 MG/1
40 TABLET, DELAYED RELEASE ORAL
Status: DISCONTINUED | OUTPATIENT
Start: 2024-01-07 | End: 2024-01-07 | Stop reason: HOSPADM

## 2024-01-06 RX ORDER — SODIUM CHLORIDE 9 MG/ML
40 INJECTION, SOLUTION INTRAVENOUS AS NEEDED
Status: DISCONTINUED | OUTPATIENT
Start: 2024-01-06 | End: 2024-01-07 | Stop reason: HOSPADM

## 2024-01-06 RX ORDER — SODIUM CHLORIDE 0.9 % (FLUSH) 0.9 %
10 SYRINGE (ML) INJECTION EVERY 12 HOURS SCHEDULED
Status: DISCONTINUED | OUTPATIENT
Start: 2024-01-06 | End: 2024-01-07 | Stop reason: HOSPADM

## 2024-01-06 RX ORDER — BISACODYL 5 MG/1
5 TABLET, DELAYED RELEASE ORAL DAILY PRN
Status: DISCONTINUED | OUTPATIENT
Start: 2024-01-06 | End: 2024-01-07 | Stop reason: HOSPADM

## 2024-01-06 RX ORDER — ACETAMINOPHEN 160 MG/5ML
650 SOLUTION ORAL EVERY 4 HOURS PRN
Status: DISCONTINUED | OUTPATIENT
Start: 2024-01-06 | End: 2024-01-07 | Stop reason: HOSPADM

## 2024-01-06 RX ORDER — MYCOPHENOLATE MOFETIL 500 MG/1
250 TABLET ORAL EVERY 12 HOURS SCHEDULED
Status: DISCONTINUED | OUTPATIENT
Start: 2024-01-06 | End: 2024-01-07 | Stop reason: HOSPADM

## 2024-01-06 RX ORDER — AMLODIPINE BESYLATE 10 MG/1
10 TABLET ORAL
Status: DISCONTINUED | OUTPATIENT
Start: 2024-01-06 | End: 2024-01-07 | Stop reason: HOSPADM

## 2024-01-06 RX ORDER — NITROGLYCERIN 0.4 MG/1
0.4 TABLET SUBLINGUAL
Status: DISCONTINUED | OUTPATIENT
Start: 2024-01-06 | End: 2024-01-07 | Stop reason: HOSPADM

## 2024-01-06 RX ORDER — ONDANSETRON 2 MG/ML
4 INJECTION INTRAMUSCULAR; INTRAVENOUS EVERY 6 HOURS PRN
Status: DISCONTINUED | OUTPATIENT
Start: 2024-01-06 | End: 2024-01-07 | Stop reason: HOSPADM

## 2024-01-06 RX ORDER — ASPIRIN 81 MG/1
81 TABLET, CHEWABLE ORAL DAILY
Status: DISCONTINUED | OUTPATIENT
Start: 2024-01-06 | End: 2024-01-07 | Stop reason: HOSPADM

## 2024-01-06 RX ORDER — POLYETHYLENE GLYCOL 3350 17 G/17G
17 POWDER, FOR SOLUTION ORAL DAILY PRN
Status: DISCONTINUED | OUTPATIENT
Start: 2024-01-06 | End: 2024-01-07 | Stop reason: HOSPADM

## 2024-01-06 RX ORDER — BISACODYL 10 MG
10 SUPPOSITORY, RECTAL RECTAL DAILY PRN
Status: DISCONTINUED | OUTPATIENT
Start: 2024-01-06 | End: 2024-01-07 | Stop reason: HOSPADM

## 2024-01-06 RX ORDER — HYDROXYCHLOROQUINE SULFATE 200 MG/1
200 TABLET, FILM COATED ORAL DAILY
Status: DISCONTINUED | OUTPATIENT
Start: 2024-01-06 | End: 2024-01-07 | Stop reason: HOSPADM

## 2024-01-06 RX ORDER — LACOSAMIDE 100 MG/1
100 TABLET ORAL EVERY 12 HOURS SCHEDULED
Status: DISCONTINUED | OUTPATIENT
Start: 2024-01-06 | End: 2024-01-07 | Stop reason: HOSPADM

## 2024-01-06 RX ORDER — ACETAMINOPHEN 650 MG/1
650 SUPPOSITORY RECTAL EVERY 4 HOURS PRN
Status: DISCONTINUED | OUTPATIENT
Start: 2024-01-06 | End: 2024-01-07 | Stop reason: HOSPADM

## 2024-01-06 RX ADMIN — LACOSAMIDE 100 MG: 100 TABLET, FILM COATED ORAL at 23:38

## 2024-01-06 RX ADMIN — MYCOPHENOLATE MOFETIL 250 MG: 500 TABLET ORAL at 23:37

## 2024-01-06 RX ADMIN — Medication 10 ML: at 23:43

## 2024-01-06 RX ADMIN — AMLODIPINE BESYLATE 10 MG: 10 TABLET ORAL at 23:36

## 2024-01-06 RX ADMIN — ASPIRIN 81 MG: 81 TABLET, CHEWABLE ORAL at 23:38

## 2024-01-06 RX ADMIN — CARVEDILOL 25 MG: 25 TABLET, FILM COATED ORAL at 23:38

## 2024-01-06 NOTE — ED PROVIDER NOTES
EMERGENCY DEPARTMENT ENCOUNTER    Room Number:  36/36  PCP: Margarita Woods APRN  Independent Historians: Patient and Family    HPI:  Chief Complaint: had concerns including Abnormal Lab.      A complete HPI/ROS/PMH/PSH/SH/FH are unobtainable due to: Poor Historian    Chronic or social conditions impacting patient care (Social Determinants of Health): None      Context: Dee Herrera is a 33 y.o. female with a medical history of descending aortic aneurysm, severe aortic valve regurgitation, end-stage renal disease on dialysis, anemia of chronic disease, seizure disorder, lupus, thrombocytopenia who presents to the ED c/o acute abnormal labs.  The patient states that she received a phone call from her nephrologist directing her to the emergency room today.  She reports she had labs drawn 2 days ago.  She reports they told her she was anemic and she needs to come to the hospital for blood transfusion.  The patient reports chronic shortness of breath.  She denies any syncope.  She does report generalized weakness which is unchanged and chronic.  She denies any blood in her stool.  She denies any source of bleeding.  She is uncertain whether she is on blood thinners or not not but old records show that she is on 81 mg aspirin.  Reports she goes to Tuesday Thursday Saturday dialysis.  She reports her last dialysis was on Thursday        Review of prior external notes (non-ED) -and- Review of prior external test results outside of this encounter: Discharge summary dated 12/29/2023 notes that she presented with severe hyponatremia and shortness of breath.  Cardiology evaluated and found that she had an aortic dissection.  She underwent aortic dissection repair with graft placement on 11/2/2023.  She was found to have cardiac tamponade.    Prescription drug monitoring program review:     N/A    PAST MEDICAL HISTORY  Active Ambulatory Problems     Diagnosis Date Noted    Vitamin D deficiency 09/27/2021    Iron  deficiency anemia 09/27/2021    Morning stiffness of joints 04/21/2022    Iron deficiency anemia, unspecified iron deficiency anemia type 07/11/2022    Thrombocytopenia 07/20/2022    Acute renal failure (ARF) 07/20/2022    Hypertension secondary to other renal disorders 07/20/2022    Pancytopenia 07/20/2022    Hypoalbuminemia 07/20/2022    Volume overload 07/20/2022    Ear drainage right 07/20/2022    T.T.P. 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 difficile colitis 02/14/2023    Anemia, chronic disease 02/18/2023    Essential hypertension 02/18/2023    Peritoneal dialysis catheter in place 04/03/2023    Anemia due to chronic kidney disease, on chronic dialysis 04/03/2023    Alternating constipation and diarrhea 05/23/2023    Abnormal stress test 05/26/2023    Hyponatremia 10/26/2023    Poor appetite 10/26/2023    Moderate malnutrition 10/26/2023    Chronic diastolic CHF (congestive heart failure) 10/26/2023    Aortic valve lesion 10/28/2023    Severe aortic valve regurgitation 10/28/2023    Dissecting ascending aortic aneurysm 10/30/2023    Recent cerebrovascular accident (CVA) 11/17/2023    Internal jugular (IJ) vein thromboembolism, chronic 12/04/2023    Acute heart failure with preserved ejection fraction (HFpEF) 12/09/2023     Resolved Ambulatory Problems     Diagnosis Date Noted    Anemia, unspecified type 04/21/2022    Elevated troponin 07/20/2022     Nausea and vomiting 07/20/2022    Hypertensive urgency 07/20/2022    Combined systolic and diastolic congestive heart failure 12/08/2022    Pericardial effusion 02/01/2023    Amyloid disease 02/03/2023    Hypertensive urgency 02/14/2023    Abdominal pain 02/18/2023     Past Medical History:   Diagnosis Date    Anasarca     Dry skin     ESRD (end stage renal disease) on dialysis     History of abdominal pain     History of anemia     History of transfusion     Hypertension     Lupus (systemic lupus erythematosus) 07/30/2022    Migraine     Other specified nutritional anemias     Renal insufficiency     Seizures     Shortness of breath          PAST SURGICAL HISTORY  Past Surgical History:   Procedure Laterality Date    ASCENDING AORTIC ANEURYSM REPAIR W/ MECHANICAL AORTIC VALVE REPLACEMENT N/A 11/2/2023    Procedure: CHETNA STERNOTOMY, AORTIC ROOT REPLACEMENT WITH VALVE SPARING MISHEL PROCEDURE, REPLACEMENT OF ASCENDING AORTA, RIGHT FEMORAL DIALYSIS CATHETER PLACEMENT AND PRP;  Surgeon: Chris Medina MD;  Location: [facility] CVOR;  Service: Cardiothoracic;  Laterality: N/A;    CARDIAC CATHETERIZATION N/A 06/14/2023    Procedure: Coronary angiography;  Surgeon: Juana Taylor MD;  Location: [facility] CATH INVASIVE LOCATION;  Service: Cardiovascular;  Laterality: N/A;    CARDIAC CATHETERIZATION N/A 06/14/2023    Procedure: Left heart cath;  Surgeon: Juana Taylor MD;  Location: [facility] CATH INVASIVE LOCATION;  Service: Cardiovascular;  Laterality: N/A;    CARDIAC CATHETERIZATION N/A 06/14/2023    Procedure: Right Heart Cath;  Surgeon: Juana Taylor MD;  Location: [facility] CATH INVASIVE LOCATION;  Service: Cardiovascular;  Laterality: N/A;    COLONOSCOPY N/A 7/20/2023    Procedure: COLONOSCOPY to cecum with biopsy;  Surgeon: Drew Kaminski MD;  Location: [facility] ENDOSCOPY;  Service: Gastroenterology;  Laterality: N/A;  PRE - diarrhea, constipation  POST - fair prep, normal    CORONARY ARTERY BYPASS GRAFT N/A 11/6/2023     Procedure: STERNAL EXPLORATION AND WASH OUT;  Surgeon: Jr Mitesh Quiroz MD;  Location: [facility] CVOR;  Service: Cardiothoracic;  Laterality: N/A;    ENDOSCOPY N/A 7/20/2023    Procedure: ESOPHAGOGASTRODUODENOSCOPY with biopsy;  Surgeon: Drew Kaminski MD;  Location: [facility] ENDOSCOPY;  Service: Gastroenterology;  Laterality: N/A;  PRE - abn ct abd  POST - gastritis    INSERTION HEMODIALYSIS CATHETER N/A 07/26/2022    Procedure: RIGHT TUNNELED DIALYSIS CATHETER PLACEMENT;  Surgeon: Diandra Adhikari MD;  Location: [facility] MAIN OR;  Service: Vascular;  Laterality: N/A;    INSERTION HEMODIALYSIS CATHETER Left 11/29/2023    Procedure: TUNNELED DIALYSIS CATHETER PLACEMENT;  Surgeon: Jose Patel II, MD;  Location: [facility] MAIN OR;  Service: Vascular;  Laterality: Left;    INSERTION PERITONEAL DIALYSIS CATHETER N/A 04/03/2023    Procedure: INSERTION PERITONEAL DIALYSIS CATHETER LAPAROSCOPIC, omentumpexy;  Surgeon: Jemal Loyola MD;  Location: [facility] MAIN OR;  Service: General;  Laterality: N/A;    REMOVAL PERITONEAL DIALYSIS CATHETER N/A 12/1/2023    Procedure: REMOVAL PERITONEAL DIALYSIS CATHETER;  Surgeon: Maryellen Ashton MD;  Location: [facility] MAIN OR;  Service: General;  Laterality: N/A;    TONSILLECTOMY           FAMILY HISTORY  Family History   Problem Relation Age of Onset    Autoimmune disease Mother     Anemia Mother     Diabetes Sister     Anemia Brother     Diabetes Maternal Grandmother     Hypertension Maternal Grandmother     Cancer Maternal Grandmother     Sickle cell anemia Cousin     Malig Hyperthermia Neg Hx          SOCIAL HISTORY  Social History     Socioeconomic History    Marital status: Single   Tobacco Use    Smoking status: Former     Types: Cigars     Passive exposure: Past    Smokeless tobacco: Never    Tobacco comments:     Patient smoked black & mild   Vaping Use    Vaping Use: Never used   Substance and Sexual Activity    Alcohol use: Yes     Comment: social    Drug use:  Yes     Types: Marijuana     Comment: OCCASIONAL    Sexual activity: Not Currently     Partners: Male     Birth control/protection: None         ALLERGIES  Minoxidil        REVIEW OF SYSTEMS  Review of Systems  Included in HPI  All systems reviewed and negative except for those discussed in HPI.      PHYSICAL EXAM    I have reviewed the triage vital signs and nursing notes.    ED Triage Vitals   Temp Heart Rate Resp BP SpO2   01/06/24 1218 01/06/24 1224 01/06/24 1224 01/06/24 1224 01/06/24 1224   97.8 °F (36.6 °C) 82 18 106/73 99 %      Temp src Heart Rate Source Patient Position BP Location FiO2 (%)   -- -- 01/06/24 1224 01/06/24 1224 --     Lying Right arm        Physical Exam  GENERAL: Awake, alert, no acute distress, chronically ill-appearing  SKIN: Warm, dry  HENT: Normocephalic, atraumatic  EYES: no scleral icterus  CV: regular rhythm, regular rate, murmur present, left anterior chest with dialysis catheter  RESPIRATORY: normal effort, lungs clear  ABDOMEN: soft, nontender, nondistended  MUSCULOSKELETAL: no deformity  NEURO: alert, moves all extremities, follows commands      NIH:                                                             LAB RESULTS  Recent Results (from the past 24 hour(s))   Comprehensive Metabolic Panel    Collection Time: 01/06/24 12:52 PM    Specimen: Blood   Result Value Ref Range    Glucose 93 65 - 99 mg/dL    BUN 24 (H) 6 - 20 mg/dL    Creatinine 5.52 (H) 0.57 - 1.00 mg/dL    Sodium 132 (L) 136 - 145 mmol/L    Potassium 4.1 3.5 - 5.2 mmol/L    Chloride 94 (L) 98 - 107 mmol/L    CO2 28.0 22.0 - 29.0 mmol/L    Calcium 8.6 8.6 - 10.5 mg/dL    Total Protein 6.4 6.0 - 8.5 g/dL    Albumin 2.9 (L) 3.5 - 5.2 g/dL    ALT (SGPT) 7 1 - 33 U/L    AST (SGOT) 18 1 - 32 U/L    Alkaline Phosphatase 83 39 - 117 U/L    Total Bilirubin 0.3 0.0 - 1.2 mg/dL    Globulin 3.5 gm/dL    A/G Ratio 0.8 g/dL    BUN/Creatinine Ratio 4.3 (L) 7.0 - 25.0    Anion Gap 10.0 5.0 - 15.0 mmol/L    eGFR 10.0 (L) >60.0  mL/min/1.73   CBC Auto Differential    Collection Time: 01/06/24 12:52 PM    Specimen: Blood   Result Value Ref Range    WBC 6.10 3.40 - 10.80 10*3/mm3    RBC 2.24 (L) 3.77 - 5.28 10*6/mm3    Hemoglobin 6.0 (C) 12.0 - 15.9 g/dL    Hematocrit 18.6 (C) 34.0 - 46.6 %    MCV 83.0 79.0 - 97.0 fL    MCH 26.8 26.6 - 33.0 pg    MCHC 32.3 31.5 - 35.7 g/dL    RDW 15.1 12.3 - 15.4 %    RDW-SD 45.2 37.0 - 54.0 fl    MPV 9.8 6.0 - 12.0 fL    Platelets 204 140 - 450 10*3/mm3    Neutrophil % 81.8 (H) 42.7 - 76.0 %    Lymphocyte % 9.8 (L) 19.6 - 45.3 %    Monocyte % 6.7 5.0 - 12.0 %    Eosinophil % 1.1 0.3 - 6.2 %    Basophil % 0.3 0.0 - 1.5 %    Immature Grans % 0.3 0.0 - 0.5 %    Neutrophils, Absolute 4.98 1.70 - 7.00 10*3/mm3    Lymphocytes, Absolute 0.60 (L) 0.70 - 3.10 10*3/mm3    Monocytes, Absolute 0.41 0.10 - 0.90 10*3/mm3    Eosinophils, Absolute 0.07 0.00 - 0.40 10*3/mm3    Basophils, Absolute 0.02 0.00 - 0.20 10*3/mm3    Immature Grans, Absolute 0.02 0.00 - 0.05 10*3/mm3    nRBC 0.0 0.0 - 0.2 /100 WBC   Protime-INR    Collection Time: 01/06/24 12:52 PM    Specimen: Blood   Result Value Ref Range    Protime 14.2 11.7 - 14.2 Seconds    INR 1.09 0.90 - 1.10   aPTT    Collection Time: 01/06/24 12:52 PM    Specimen: Blood   Result Value Ref Range    PTT 29.2 22.7 - 35.4 seconds   Type & Screen    Collection Time: 01/06/24 12:52 PM    Specimen: Blood   Result Value Ref Range    ABO Type O     RH type Positive     Antibody Screen Negative     T&S Expiration Date 1/9/2024 11:59:59 PM    Prepare RBC, 1 Units    Collection Time: 01/06/24  1:56 PM   Result Value Ref Range    Product Code M4895W68     Unit Number [id number]     UNIT  ABO O     UNIT  RH POS     Crossmatch Interpretation Compatible     Dispense Status XM     Blood Expiration Date [id number]     Blood Type Barcode 5100          RADIOLOGY  No Radiology Exams Resulted Within Past 24 Hours      MEDICATIONS GIVEN IN ER  Medications   sodium chloride 0.9 % flush 10  mL (has no administration in time range)         ORDERS PLACED DURING THIS VISIT:  Orders Placed This Encounter   Procedures    Comprehensive Metabolic Panel    CBC Auto Differential    Protime-INR    aPTT    Monitor Blood Pressure    Pulse Oximetry, Continuous    Verify Informed Consent    Nephrology (on -call MD unless specified)    [facility] (on-call MD unless specified) Details    Type & Screen    Prepare RBC, 1 Units    Insert Peripheral IV    Hemodialysis Inpatient    Initiate Observation Status    CBC & Differential         OUTPATIENT MEDICATION MANAGEMENT:  Current Facility-Administered Medications Ordered in Epic   Medication Dose Route Frequency Provider Last Rate Last Admin    sodium chloride 0.9 % flush 10 mL  10 mL Intravenous PRN Anthony Hawthorne MD         Current Outpatient Medications Ordered in Epic   Medication Sig Dispense Refill    amLODIPine (NORVASC) 10 MG tablet Take 1 tablet by mouth Daily.      aspirin 81 MG chewable tablet Chew 1 tablet Daily.      carvedilol (COREG) 25 MG tablet Take 1 tablet by mouth Every 12 (Twelve) Hours. 60 tablet 0    escitalopram (LEXAPRO) 10 MG tablet Take 1 tablet by mouth Daily.      hydroxychloroquine (PLAQUENIL) 200 MG tablet Take 1 tablet by mouth Daily.      lacosamide (VIMPAT) 100 MG tablet tablet Take 1 tablet by mouth Every 12 (Twelve) Hours. 6 tablet 0    lisinopril (PRINIVIL,ZESTRIL) 10 MG tablet Take 1 tablet by mouth Daily.      mycophenolate (CELLCEPT) 500 MG tablet Take 0.5 tablets by mouth Every 12 (Twelve) Hours. 30 tablet 0    ondansetron (Zofran) 4 MG tablet Take 1 tablet by mouth Every 8 (Eight) Hours As Needed for Nausea or Vomiting. 30 tablet 1    pantoprazole (PROTONIX) 40 MG EC tablet Take 1 tablet by mouth Every Morning Before Breakfast.      polyethylene glycol (MIRALAX) 17 GM/SCOOP powder Take 17 g by mouth As Needed.           PROCEDURES  Procedures      Critical care provider statement:    Critical care time (minutes): 30-74.   Critical care  time was exclusive of:  Separately billable procedures and treating other patients   Critical care was necessary to treat or prevent imminent or life-threatening deterioration of the following conditions:  Circulatory Failure   Critical care was time spent personally by me on the following activities:  Development of treatment plan with patient or surrogate, discussions with consultants, evaluation of patient's response to treatment, examination of patient, obtaining history from patient or surrogate, ordering and performing treatments and interventions, ordering and review of laboratory studies, ordering and review of radiographic studies, pulse oximetry, re-evaluation of patient's condition and review of old charts. Critical Care indicators:        PROGRESS, DATA ANALYSIS, CONSULTS, AND MEDICAL DECISION MAKING  All labs have been independently interpreted by me.  All radiology studies have been reviewed by me. All EKG's have been independently viewed and interpreted by me.  Discussion below represents my analysis of pertinent findings related to patient's condition, differential diagnosis, treatment plan and final disposition.    Differential diagnosis includes but is not limited to anemia, GI bleed, chronic anemia, symptomatic anemia, renal failure, hyponatremia.    Clinical Scores:                 ED Course as of 01/06/24 1403   Sat Jan 06, 2024   1331 Hemoglobin(!!): 6.0 [TR]   1339 I reviewed the workup and findings with the patient and family at the bedside.  Answered all questions.  Her potassium is normal.  Her hemoglobin is lower than it has been at 6.0.  Plan to transfuse 1 unit for symptomatic anemia.  I have a call out to the patient's nephrologist as well as hospitalist for admission.  The patient and family are agreeable. [TR]   1355 Discussing with Dr. Cisse with A.  He agrees to admit. [TR]   1402 Discussing with nephrology, Dr. Simmons.  He agrees to consult. [TR]      ED Course User  Index  [TR] Anthony Hawthorne MD             AS OF 14:03 EST VITALS:    BP - 106/73  HR - 82  TEMP - 97.8 °F (36.6 °C)  O2 SATS - 99%      COMPLEXITY OF CARE  Number / Complexity of Problems Addressed at this Encounter:   Problems addressed this visit: Acute on chronic anemia, symptomatic anemia, end-stage renal disease on dialysis, due for dialysis  Factors that increase risk in this patient: Patient is a poor historian, complex medical history including recent surgeries  Amount and/or Complexity of Data to be Reviewed and Analyzed:   Risk of Complications and/or Morbidity or Mortality of Patient Management:         DIAGNOSIS  Final diagnoses:   Acute on chronic anemia   ESRD on dialysis         DISPOSITION  ED Disposition       ED Disposition   Decision to Admit    Condition   --    Comment   Level of Care: Telemetry [5]   Diagnosis: Acute on chronic anemia [[id number]]   Admitting Physician: FROILAN HERNANDEZ [[id number]]   Attending Physician: FROILAN HERNANDEZ [[id number]]                  Please note that portions of this document were completed with a voice recognition program.    Note Disclaimer: At [facility], we believe that sharing information builds trust and better relationships. You are receiving this note because you recently visited [facility]. It is possible you will see health information before a provider has talked with you about it. This kind of information can be easy to misunderstand. To help you fully understand what it means for your health, we urge you to discuss this note with your provider.         Anthony Hawthorne MD  01/06/24 [id number]

## 2024-01-06 NOTE — ED NOTES
"Nursing report ED to floor  Dee Herrera  33 y.o.  female    HPI :   Chief Complaint   Patient presents with    Abnormal Lab       Admitting doctor:   Brian Cisse MD    Admitting diagnosis:   The primary encounter diagnosis was Acute on chronic anemia. A diagnosis of ESRD on dialysis was also pertinent to this visit.    Code status:   Current Code Status       Date Active Code Status Order ID Comments User Context       Prior            Allergies:   Minoxidil    Isolation:   No active isolations    Intake and Output  No intake or output data in the 24 hours ending 01/06/24 1402    Weight:       01/06/24  1229   Weight: 46.7 kg (103 lb)       Most recent vitals:   Vitals:    01/06/24 1218 01/06/24 1224 01/06/24 1229   BP:  106/73    BP Location:  Right arm    Patient Position:  Lying    Pulse:  82    Resp:  18    Temp: 97.8 °F (36.6 °C)     SpO2:  99%    Weight:   46.7 kg (103 lb)   Height:   162.6 cm (64\")       Active LDAs/IV Access:   Lines, Drains & Airways       Active LDAs       Name Placement date Placement time Site Days    Peripheral IV 01/06/24 1312 Anterior;Left;Upper Arm 01/06/24  1312  Arm  less than 1    Hemodialysis Cath Double 11/29/23  1222  Internal Jugular  38                    Labs (abnormal labs have a star):   Labs Reviewed   COMPREHENSIVE METABOLIC PANEL - Abnormal; Notable for the following components:       Result Value    BUN 24 (*)     Creatinine 5.52 (*)     Sodium 132 (*)     Chloride 94 (*)     Albumin 2.9 (*)     BUN/Creatinine Ratio 4.3 (*)     eGFR 10.0 (*)     All other components within normal limits    Narrative:     GFR Normal >60  Chronic Kidney Disease <60  Kidney Failure <15     CBC WITH AUTO DIFFERENTIAL - Abnormal; Notable for the following components:    RBC 2.24 (*)     Hemoglobin 6.0 (*)     Hematocrit 18.6 (*)     Neutrophil % 81.8 (*)     Lymphocyte % 9.8 (*)     Lymphocytes, Absolute 0.60 (*)     All other components within normal limits   PROTIME-INR - Normal "   APTT - Normal   TYPE AND SCREEN   PREPARE RBC   CBC AND DIFFERENTIAL    Narrative:     The following orders were created for panel order CBC & Differential.  Procedure                               Abnormality         Status                     ---------                               -----------         ------                     CBC Auto Differential[[id number]]        Abnormal            Final result                 Please view results for these tests on the individual orders.       EKG:   No orders to display       Meds given in ED:   Medications   sodium chloride 0.9 % flush 10 mL (has no administration in time range)       Imaging results:  No radiology results for the last day    Ambulatory status:   - ad italo    Social issues:   Social History     Socioeconomic History    Marital status: Single   Tobacco Use    Smoking status: Former     Types: Cigars     Passive exposure: Past    Smokeless tobacco: Never    Tobacco comments:     Patient smoked black & mild   Vaping Use    Vaping Use: Never used   Substance and Sexual Activity    Alcohol use: Yes     Comment: social    Drug use: Yes     Types: Marijuana     Comment: OCCASIONAL    Sexual activity: Not Currently     Partners: Male     Birth control/protection: None       NIH Stroke Scale:       Lance Back RN  01/06/24 14:02 EST

## 2024-01-06 NOTE — ED NOTES
Pt to ER via [facility]. Pt family states pt was supposed to be dialyzed today but was advised by [facility] to bring pt to ER because pt's hemoglobin was low. Pt reports dizziness. T [facility] S dialysis.

## 2024-01-07 ENCOUNTER — READMISSION MANAGEMENT (OUTPATIENT)
Dept: CALL CENTER | Facility: HOSPITAL | Age: 34
End: 2024-01-07
Payer: MEDICARE

## 2024-01-07 VITALS
DIASTOLIC BLOOD PRESSURE: 87 MMHG | HEART RATE: 93 BPM | SYSTOLIC BLOOD PRESSURE: 117 MMHG | HEIGHT: 64 IN | BODY MASS INDEX: 17.58 KG/M2 | WEIGHT: 103 LBS | OXYGEN SATURATION: 100 % | TEMPERATURE: 98.6 F | RESPIRATION RATE: 18 BRPM

## 2024-01-07 LAB
ALBUMIN SERPL-MCNC: 2.6 G/DL (ref 3.5–5.2)
ALBUMIN/GLOB SERPL: 0.7 G/DL
ALP SERPL-CCNC: 75 U/L (ref 39–117)
ALT SERPL W P-5'-P-CCNC: 10 U/L (ref 1–33)
ANION GAP SERPL CALCULATED.3IONS-SCNC: 9.3 MMOL/L (ref 5–15)
AST SERPL-CCNC: 18 U/L (ref 1–32)
BASOPHILS # BLD AUTO: 0.03 10*3/MM3 (ref 0–0.2)
BASOPHILS NFR BLD AUTO: 0.5 % (ref 0–1.5)
BH BB BLOOD EXPIRATION DATE: NORMAL
BH BB BLOOD EXPIRATION DATE: NORMAL
BH BB BLOOD TYPE BARCODE: 5100
BH BB BLOOD TYPE BARCODE: 5100
BH BB DISPENSE STATUS: NORMAL
BH BB DISPENSE STATUS: NORMAL
BH BB PRODUCT CODE: NORMAL
BH BB PRODUCT CODE: NORMAL
BH BB UNIT NUMBER: NORMAL
BH BB UNIT NUMBER: NORMAL
BILIRUB SERPL-MCNC: 0.4 MG/DL (ref 0–1.2)
BUN SERPL-MCNC: 8 MG/DL (ref 6–20)
BUN/CREAT SERPL: 3.4 (ref 7–25)
CALCIUM SPEC-SCNC: 8.7 MG/DL (ref 8.6–10.5)
CHLORIDE SERPL-SCNC: 100 MMOL/L (ref 98–107)
CO2 SERPL-SCNC: 23.7 MMOL/L (ref 22–29)
CREAT SERPL-MCNC: 2.36 MG/DL (ref 0.57–1)
CROSSMATCH INTERPRETATION: NORMAL
CROSSMATCH INTERPRETATION: NORMAL
DEPRECATED RDW RBC AUTO: 44.4 FL (ref 37–54)
EGFRCR SERPLBLD CKD-EPI 2021: 27.6 ML/MIN/1.73
EOSINOPHIL # BLD AUTO: 0.03 10*3/MM3 (ref 0–0.4)
EOSINOPHIL NFR BLD AUTO: 0.5 % (ref 0.3–6.2)
ERYTHROCYTE [DISTWIDTH] IN BLOOD BY AUTOMATED COUNT: 14.8 % (ref 12.3–15.4)
GLOBULIN UR ELPH-MCNC: 3.7 GM/DL
GLUCOSE SERPL-MCNC: 93 MG/DL (ref 65–99)
HCT VFR BLD AUTO: 28.9 % (ref 34–46.6)
HGB BLD-MCNC: 9.5 G/DL (ref 12–15.9)
IMM GRANULOCYTES # BLD AUTO: 0.04 10*3/MM3 (ref 0–0.05)
IMM GRANULOCYTES NFR BLD AUTO: 0.6 % (ref 0–0.5)
LYMPHOCYTES # BLD AUTO: 0.73 10*3/MM3 (ref 0.7–3.1)
LYMPHOCYTES NFR BLD AUTO: 11.1 % (ref 19.6–45.3)
MCH RBC QN AUTO: 27.3 PG (ref 26.6–33)
MCHC RBC AUTO-ENTMCNC: 32.9 G/DL (ref 31.5–35.7)
MCV RBC AUTO: 83 FL (ref 79–97)
MONOCYTES # BLD AUTO: 0.75 10*3/MM3 (ref 0.1–0.9)
MONOCYTES NFR BLD AUTO: 11.4 % (ref 5–12)
NEUTROPHILS NFR BLD AUTO: 5.02 10*3/MM3 (ref 1.7–7)
NEUTROPHILS NFR BLD AUTO: 75.9 % (ref 42.7–76)
NRBC BLD AUTO-RTO: 0 /100 WBC (ref 0–0.2)
PLATELET # BLD AUTO: 216 10*3/MM3 (ref 140–450)
PMV BLD AUTO: 11.2 FL (ref 6–12)
POTASSIUM SERPL-SCNC: 3.6 MMOL/L (ref 3.5–5.2)
PROT SERPL-MCNC: 6.3 G/DL (ref 6–8.5)
RBC # BLD AUTO: 3.48 10*6/MM3 (ref 3.77–5.28)
SODIUM SERPL-SCNC: 133 MMOL/L (ref 136–145)
UNIT  ABO: NORMAL
UNIT  ABO: NORMAL
UNIT  RH: NORMAL
UNIT  RH: NORMAL
WBC NRBC COR # BLD AUTO: 6.6 10*3/MM3 (ref 3.4–10.8)

## 2024-01-07 PROCEDURE — 80053 COMPREHEN METABOLIC PANEL: CPT | Performed by: INTERNAL MEDICINE

## 2024-01-07 PROCEDURE — G0378 HOSPITAL OBSERVATION PER HR: HCPCS

## 2024-01-07 PROCEDURE — 63710000001 MYCOPHENOLATE MOFETIL PER 250 MG: Performed by: INTERNAL MEDICINE

## 2024-01-07 PROCEDURE — 85025 COMPLETE CBC W/AUTO DIFF WBC: CPT | Performed by: INTERNAL MEDICINE

## 2024-01-07 RX ORDER — FOLIC ACID 1 MG/1
1 TABLET ORAL DAILY
Start: 2024-01-07 | End: 2024-01-10

## 2024-01-07 RX ORDER — FOLIC ACID 1 MG/1
1 TABLET ORAL DAILY
Status: DISCONTINUED | OUTPATIENT
Start: 2024-01-07 | End: 2024-01-07 | Stop reason: HOSPADM

## 2024-01-07 RX ADMIN — HYDROXYCHLOROQUINE SULFATE 200 MG: 200 TABLET ORAL at 08:51

## 2024-01-07 RX ADMIN — CARVEDILOL 25 MG: 25 TABLET, FILM COATED ORAL at 08:51

## 2024-01-07 RX ADMIN — PANTOPRAZOLE SODIUM 40 MG: 40 TABLET, DELAYED RELEASE ORAL at 08:51

## 2024-01-07 RX ADMIN — ESCITALOPRAM OXALATE 10 MG: 10 TABLET, FILM COATED ORAL at 08:51

## 2024-01-07 RX ADMIN — LISINOPRIL 10 MG: 10 TABLET ORAL at 08:51

## 2024-01-07 RX ADMIN — MYCOPHENOLATE MOFETIL 250 MG: 500 TABLET ORAL at 08:51

## 2024-01-07 RX ADMIN — ASPIRIN 81 MG: 81 TABLET, CHEWABLE ORAL at 08:51

## 2024-01-07 RX ADMIN — AMLODIPINE BESYLATE 10 MG: 10 TABLET ORAL at 08:51

## 2024-01-07 RX ADMIN — LACOSAMIDE 100 MG: 100 TABLET, FILM COATED ORAL at 08:51

## 2024-01-07 NOTE — CONSULTS
[facility] Consult Note      Patient Name: Dee Herrera  : 1990  MRN: [id number]  Primary Care Physician:  Margarita Woods APRN  Referring Physician: Brian Cisse MD  Date of admission: 2024    Subjective     Reason for Consult:  ESRD    HPI:   Dee Herrera is a 33 y.o. female with a past medical history of end-stage renal disease on hemodialysis of note patient was on peritoneal dialysis up till her last admission when she was noted to have severe aortic insufficiency with dissection of the ascending aorta and underwent surgical repair on 2023.  Patient has been doing well since her discharge however she was told to go to the hospital due to severe anemia.  She has been dialyzing through the third of Saturday through her left tunneled dialysis catheter.  We were consulted to help with management of her dialysis need.  Patient had dialysis continues of blood transfusion overnight.  She feels better and ready to go home.    Review of Systems:   14 point review of systems is otherwise negative except for mentioned above on HPI    Personal History     Past Medical History:   Diagnosis Date    Anasarca     PER CT SCAN    Dry skin     ESRD (end stage renal disease) on dialysis     TUES, THURS, SAT UMAIR CHAVIRA HWDRAKE    History of abdominal pain     History of anemia     History of transfusion     Hypertension     Iron deficiency anemia 2021    Lupus (systemic lupus erythematosus) 2022    Migraine     Other specified nutritional anemias     Pericardial effusion     Renal insufficiency     Seizures     STATES LAST WAS 2023    Shortness of breath     OCCASIONAL    Vitamin D deficiency 2021       Past Surgical History:   Procedure Laterality Date    ASCENDING AORTIC ANEURYSM REPAIR W/ MECHANICAL AORTIC VALVE REPLACEMENT N/A 2023    Procedure: CHETNA STERNOTOMY, AORTIC ROOT REPLACEMENT WITH VALVE SPARING MISHEL PROCEDURE, REPLACEMENT OF  ASCENDING AORTA, RIGHT FEMORAL DIALYSIS CATHETER PLACEMENT AND PRP;  Surgeon: Chris Medina MD;  Location: [facility] CVOR;  Service: Cardiothoracic;  Laterality: N/A;    CARDIAC CATHETERIZATION N/A 06/14/2023    Procedure: Coronary angiography;  Surgeon: Juana Taylor MD;  Location: [facility] CATH INVASIVE LOCATION;  Service: Cardiovascular;  Laterality: N/A;    CARDIAC CATHETERIZATION N/A 06/14/2023    Procedure: Left heart cath;  Surgeon: Juana Taylor MD;  Location: [facility] CATH INVASIVE LOCATION;  Service: Cardiovascular;  Laterality: N/A;    CARDIAC CATHETERIZATION N/A 06/14/2023    Procedure: Right Heart Cath;  Surgeon: Juana Taylor MD;  Location: [facility] CATH INVASIVE LOCATION;  Service: Cardiovascular;  Laterality: N/A;    COLONOSCOPY N/A 7/20/2023    Procedure: COLONOSCOPY to cecum with biopsy;  Surgeon: Drew Kaminski MD;  Location: [facility] ENDOSCOPY;  Service: Gastroenterology;  Laterality: N/A;  PRE - diarrhea, constipation  POST - fair prep, normal    CORONARY ARTERY BYPASS GRAFT N/A 11/6/2023    Procedure: STERNAL EXPLORATION AND WASH OUT;  Surgeon: Jr Mitesh Quiroz MD;  Location: [facility] CVOR;  Service: Cardiothoracic;  Laterality: N/A;    ENDOSCOPY N/A 7/20/2023    Procedure: ESOPHAGOGASTRODUODENOSCOPY with biopsy;  Surgeon: Drew Kaminski MD;  Location: [facility] ENDOSCOPY;  Service: Gastroenterology;  Laterality: N/A;  PRE - abn ct abd  POST - gastritis    INSERTION HEMODIALYSIS CATHETER N/A 07/26/2022    Procedure: RIGHT TUNNELED DIALYSIS CATHETER PLACEMENT;  Surgeon: Diandra Adhikari MD;  Location: [facility] MAIN OR;  Service: Vascular;  Laterality: N/A;    INSERTION HEMODIALYSIS CATHETER Left 11/29/2023    Procedure: TUNNELED DIALYSIS CATHETER PLACEMENT;  Surgeon: Jose Patel II, MD;  Location: [facility] MAIN OR;  Service: Vascular;  Laterality: Left;    INSERTION PERITONEAL DIALYSIS CATHETER N/A 04/03/2023    Procedure: INSERTION PERITONEAL DIALYSIS CATHETER LAPAROSCOPIC, omentumpexy;   Surgeon: Jemal Loyola MD;  Location: [facility] MAIN OR;  Service: General;  Laterality: N/A;    REMOVAL PERITONEAL DIALYSIS CATHETER N/A 12/1/2023    Procedure: REMOVAL PERITONEAL DIALYSIS CATHETER;  Surgeon: Maryellen Ashton MD;  Location: [facility] MAIN OR;  Service: General;  Laterality: N/A;    TONSILLECTOMY         Family History: family history includes Anemia in her brother and mother; Autoimmune disease in her mother; Cancer in her maternal grandmother; Diabetes in her maternal grandmother and sister; Hypertension in her maternal grandmother; Sickle cell anemia in her cousin.    Social History:  reports that she has quit smoking. Her smoking use included cigars. She has been exposed to tobacco smoke. She has never used smokeless tobacco. She reports current alcohol use. She reports current drug use. Drug: Marijuana.    Home Medications:  Prior to Admission medications    Medication Sig Start Date End Date Taking? Authorizing Provider   amLODIPine (NORVASC) 10 MG tablet Take 1 tablet by mouth Daily. 12/10/23  Yes Bobby Carter MD   aspirin 81 MG chewable tablet Chew 1 tablet Daily. 12/10/23  Yes Bobby Carter MD   carvedilol (COREG) 25 MG tablet Take 1 tablet by mouth Every 12 (Twelve) Hours. 2/18/23  Yes Hernan Corcoran DO   escitalopram (LEXAPRO) 10 MG tablet Take 1 tablet by mouth Daily. 12/10/23  Yes Bobby Carter MD   folic acid (FOLVITE) 1 MG tablet Take 1 tablet by mouth Daily. Over the counter 1/7/24  Yes Daniel Sims MD   hydroxychloroquine (PLAQUENIL) 200 MG tablet Take 1 tablet by mouth Daily. 11/22/22  Yes Ivet Manzano MD   lacosamide (VIMPAT) 100 MG tablet tablet Take 1 tablet by mouth Every 12 (Twelve) Hours. 12/9/23  Yes Bobby Carter MD   lisinopril (PRINIVIL,ZESTRIL) 10 MG tablet Take 1 tablet by mouth Daily. 12/10/23  Yes Bobby Carter MD   mycophenolate (CELLCEPT) 500 MG tablet Take 0.5 tablets by  mouth Every 12 (Twelve) Hours. 2/18/23  Yes Hernan Corcoran DO   ondansetron (Zofran) 4 MG tablet Take 1 tablet by mouth Every 8 (Eight) Hours As Needed for Nausea or Vomiting. 4/3/23 4/2/24 Yes Jemal Loyola MD   pantoprazole (PROTONIX) 40 MG EC tablet Take 1 tablet by mouth Every Morning Before Breakfast. 12/10/23  Yes Bobby Carter MD   polyethylene glycol (MIRALAX) 17 GM/SCOOP powder Take 17 g by mouth As Needed. 5/23/23  Yes ProviderIvet MD       Allergies:  Allergies   Allergen Reactions    Minoxidil Hives and Other (See Comments)     Pericardial effusion .       Objective     Vitals:   Temp:  [97.8 °F (36.6 °C)-99.1 °F (37.3 °C)] 98.6 °F (37 °C)  Heart Rate:  [75-93] 93  Resp:  [16-18] 18  BP: (100-120)/(47-87) 117/87    Intake/Output Summary (Last 24 hours) at 1/7/2024 1048  Last data filed at 1/6/2024 2115  Gross per 24 hour   Intake 687.5 ml   Output --   Net 687.5 ml       Physical Exam:    General Appearance: alert, oriented x 3, no acute distress   Skin: warm and dry  HEENT: oral mucosa normal, nonicteric sclera  Neck: supple, no JVD  Lungs: CTA  Heart: RRR, normal S1 and S2  Abdomen: soft, nontender, nondistended  : no palpable bladder  Extremities: no edema, cyanosis or clubbing  Neuro: normal speech and mental status     Scheduled Meds:     amLODIPine, 10 mg, Oral, Q24H  aspirin, 81 mg, Oral, Daily  carvedilol, 25 mg, Oral, Q12H  escitalopram, 10 mg, Oral, Daily  folic acid, 1 mg, Oral, Daily  hydroxychloroquine, 200 mg, Oral, Daily  lacosamide, 100 mg, Oral, Q12H  lisinopril, 10 mg, Oral, Q24H  mycophenolate, 250 mg, Oral, Q12H  pantoprazole, 40 mg, Oral, QAM AC  senna-docusate sodium, 2 tablet, Oral, BID  sodium chloride, 10 mL, Intravenous, Q12H      IV Meds:        Results Reviewed:   I have personally reviewed the results from the time of this admission to 1/7/2024 10:48 EST     Lab Results   Component Value Date    GLUCOSE 93 01/07/2024    CALCIUM 8.7  01/07/2024     (L) 01/07/2024    K 3.6 01/07/2024    CO2 23.7 01/07/2024     01/07/2024    BUN 8 01/07/2024    CREATININE 2.36 (H) 01/07/2024    EGFRIFAFRI 8 (L) 03/20/2023    BCR 3.4 (L) 01/07/2024    ANIONGAP 9.3 01/07/2024      Lab Results   Component Value Date    MG 1.6 12/08/2023    PHOS 2.7 12/09/2023    ALBUMIN 2.6 (L) 01/07/2024           Assessment / Plan     ASSESSMENT:    End-stage renal disease on secondary to lupus nephritis on hemodialysis  Anemia of ESRD: Status post 2 units blood transfusion  History of dissection of aortic aneurysm status postrepair followed by reexploration for cardiac tamponade and pericardial hematoma evacuation in November 2023   Cardiomyopathy ejection fraction about 40%  Valvular heart disease with severe aortic insufficiency with mobile density on the aortic valve, she had CHETNA today she had negative blood culture  SLE.    Plan:    Continue hemodialysis per her schedule.  Okay to be discharged from kidney standpoint  To follow-up with her dialysis unit  Discussed with her importance of compliance  Surveillance labs    Thank you for involving us in the care of Dee Herrera.  Please feel free to call with any questions.    Pastor Kaiser MD  01/07/24  10:48 EST    [facility]  [phone]

## 2024-01-07 NOTE — OUTREACH NOTE
Prep Survey      Flowsheet Row Responses   [facility] patient discharged from? [place]   Is LACE score < 7 ? No   Eligibility [facility]   Date of Admission 01/06/24   Date of Discharge 01/07/24   Discharge Disposition Home or Self Care   Discharge diagnosis *Symptomatic anemia   Does the patient have one of the following disease processes/diagnoses(primary or secondary)? Other   Does the patient have Home health ordered? No   Is there a DME ordered? No   Prep survey completed? Yes            ROBERT BARBER - Registered Nurse

## 2024-01-07 NOTE — CASE MANAGEMENT/SOCIAL WORK
Case Management Discharge Note      Final Note: dc home         Selected Continued Care - Discharged on 1/7/2024 Admission date: 1/6/2024 - Discharge disposition: Home or Self Care      Destination    No services have been selected for the patient.                Durable Medical Equipment    No services have been selected for the patient.                Dialysis/Infusion    No services have been selected for the patient.                Home Medical Care    No services have been selected for the patient.                Therapy    No services have been selected for the patient.                Community Resources    No services have been selected for the patient.                Community & DME    No services have been selected for the patient.                    Selected Continued Care - Prior Encounters Includes continued care and service providers with selected services from prior encounters from 10/8/2023 to 1/7/2024      Discharged on 12/9/2023 Admission date: 10/26/2023 - Discharge disposition: Skilled Nursing Facility (DC - External)      Destination       Service Provider Selected Services Address Phone Fax Patient Preferred    [facility] [address] [facility] [address] [phone] --                               Final Discharge Disposition Code: 01 - home or self-care

## 2024-01-07 NOTE — DISCHARGE SUMMARY
NAME: Dee Herrera ADMIT: 2024   : 1990  PCP: Margarita Woods APRN    MRN: [id number] LOS: 0 days   AGE/SEX: 33 y.o. female  ROOM: [facility]     Date of Admission:  2024  Date of Discharge:  2024    PCP: Margarita Woods APRN    CHIEF COMPLAINT  Abnormal Lab      DISCHARGE DIAGNOSIS  Active Hospital Problems    Diagnosis  POA    **Symptomatic anemia [D64.9]  Yes    Acute on chronic anemia [D64.9]  Yes    Internal jugular (IJ) vein thromboembolism, chronic [I82.C29]  Yes    Recent cerebrovascular accident (CVA) [Z86.73]  Yes    Dissecting ascending aortic aneurysm [I71.010]  Yes    Chronic diastolic CHF (congestive heart failure) [I50.32]  Yes    Essential hypertension [I10]  Yes    End stage renal disease on dialysis [N18.6, Z99.2]  Not Applicable    Seizure disorder [G40.909]  Yes    Lupus nephritis, 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       SECONDARY DIAGNOSES  Past Medical History:   Diagnosis Date    Anasarca     PER CT SCAN    Dry skin     ESRD (end stage renal disease) on dialysis     MARCO COLE, KATIE FRASER    History of abdominal pain     History of anemia     History of transfusion     Hypertension     Iron deficiency anemia 2021    Lupus (systemic lupus erythematosus) 2022    Migraine     Other specified nutritional anemias     Pericardial effusion     Renal insufficiency     Seizures     STATES LAST WAS 2023    Shortness of breath     OCCASIONAL    Vitamin D deficiency 2021       CONSULTS   Nephrology     HOSPITAL COURSE  32 yo with history of ESRD secondary to lupus nephritis now on HD instead of PD, recent aortic dissection s/p repair on  with complicated postoperative course resulting in cardiac tamponade and reexploration and evacuation of hematoma 4 days  later.  Subsequently patient had prolonged decline in mental status and was followed by nephrology cardiology and psychiatry and infectious disease service.  She was eventually able to recover mentally enough to be eventually discharged on 12/9/2023. She presents with dyspnea and fatigue and found to have Hgb of 6. Her folic acid is low at 2.9. Other labs c/w iron deficiency anemia. S/p blood transfusion with improvement in Hgb. She had dialysis. She wishes for discharge today. No signs/symptoms of bleeding.      DIAGNOSTICS    Collected Updated Procedure Result Status    01/07/2024 0625 01/07/2024 0712 Comprehensive Metabolic Panel [[id number]]    (Abnormal)   Blood    Final result Component Value Units   Glucose 93 mg/dL   BUN 8 mg/dL   Creatinine 2.36 High  mg/dL   Sodium 133 Low  mmol/L   Potassium 3.6 mmol/L   Chloride 100 mmol/L   CO2 23.7 mmol/L   Calcium 8.7 mg/dL   Total Protein 6.3 g/dL   Albumin 2.6 Low  g/dL   ALT (SGPT) 10 U/L   AST (SGOT) 18 U/L   Alkaline Phosphatase 75 U/L   Total Bilirubin 0.4 mg/dL   Globulin 3.7 gm/dL   A/G Ratio 0.7 g/dL   BUN/Creatinine Ratio 3.4 Low     Anion Gap 9.3 mmol/L   eGFR 27.6 Low  mL/min/1.73          01/07/2024 0624 01/07/2024 0803 CBC Auto Differential [[id number]]   (Abnormal)   Blood    Final result Component Value Units   WBC 6.60 10*3/mm3   RBC 3.48 Low  10*6/mm3   Hemoglobin 9.5 Low  g/dL   Hematocrit 28.9 Low  %   MCV 83.0 fL   MCH 27.3 pg   MCHC 32.9 g/dL   RDW 14.8 %   RDW-SD 44.4 fl   MPV 11.2 fL   Platelets 216 10*3/mm3   Neutrophil % 75.9 %   Lymphocyte % 11.1 Low  %   Monocyte % 11.4 %   Eosinophil % 0.5 %   Basophil % 0.5 %   Immature Grans % 0.6 High  %   Neutrophils, Absolute 5.02 10*3/mm3   Lymphocytes, Absolute 0.73 10*3/mm3   Monocytes, Absolute 0.75 10*3/mm3   Eosinophils, Absolute 0.03 10*3/mm3   Basophils, Absolute 0.03 10*3/mm3   Immature Grans, Absolute 0.04 10*3/mm3        01/06/2024 1946 01/06/2024 2040 Vitamin B12 [[id number]]    Blood     "Final result Component Value Units   Vitamin B-12 694 pg/mL          01/06/2024 1946 01/06/2024 2040 Folate [[id number]]    (Abnormal)   Blood    Final result Component Value Units   Folate 2.90 Low  ng/mL               01/06/2024 1252 01/06/2024 2002 Ferritin [[id number]]    (Abnormal)   Blood    Final result Component Value Units   Ferritin 2,702.00 High  ng/mL          01/06/2024 1252 01/06/2024 1936 Iron [[id number]]   (Abnormal)   Blood    Final result Component Value Units   Iron 26 Low  mcg/dL          01/06/2024 1252 01/06/2024 1936 Iron Profile [[id number]]   (Abnormal)   Blood    Final result Component Value Units   Iron 26 Low  mcg/dL   Iron Saturation (TSAT) 14 Low  %   Transferrin 124 Low  mg/dL   TIBC 185 Low  mcg/dL          01/06/2024 1252 01/06/2024 2034 Reticulocytes [[id number]]   Blood    Final result Component Value Units   Reticulocyte % 1.07 %   Reticulocyte Absolute 0.0251 10*6/mm3                   PHYSICAL EXAM  Objective:  Vital signs: (most recent): Blood pressure 117/87, pulse 93, temperature 98.6 °F (37 °C), temperature source Oral, resp. rate 18, height 162.6 cm (64\"), weight 46.7 kg (103 lb), last menstrual period 08/10/2022, SpO2 100%, not currently breastfeeding.                Alert  nad  No resp distress  Feels well and wishes for discharge today     CONDITION ON DISCHARGE  Stable.      DISCHARGE DISPOSITION   Home or Self Care      DISCHARGE MEDICATIONS       Your medication list        START taking these medications        Instructions Last Dose Given Next Dose Due   folic acid 1 MG tablet  Commonly known as: FOLVITE      Take 1 tablet by mouth Daily. Over the counter              CONTINUE taking these medications        Instructions Last Dose Given Next Dose Due   amLODIPine 10 MG tablet  Commonly known as: NORVASC      Take 1 tablet by mouth Daily.       aspirin 81 MG chewable tablet      Chew 1 tablet Daily.       carvedilol 25 MG tablet  Commonly known as: COREG      Take 1 " tablet by mouth Every 12 (Twelve) Hours.       escitalopram 10 MG tablet  Commonly known as: LEXAPRO      Take 1 tablet by mouth Daily.       hydroxychloroquine 200 MG tablet  Commonly known as: PLAQUENIL      Take 1 tablet by mouth Daily.       lacosamide 100 MG tablet tablet  Commonly known as: VIMPAT      Take 1 tablet by mouth Every 12 (Twelve) Hours.       lisinopril 10 MG tablet  Commonly known as: PRINIVIL,ZESTRIL      Take 1 tablet by mouth Daily.       mycophenolate 500 MG tablet  Commonly known as: CELLCEPT      Take 0.5 tablets by mouth Every 12 (Twelve) Hours.       ondansetron 4 MG tablet  Commonly known as: Zofran      Take 1 tablet by mouth Every 8 (Eight) Hours As Needed for Nausea or Vomiting.       pantoprazole 40 MG EC tablet  Commonly known as: PROTONIX      Take 1 tablet by mouth Every Morning Before Breakfast.       polyethylene glycol 17 GM/SCOOP powder  Commonly known as: MIRALAX      Take 17 g by mouth As Needed.                 Where to Get Your Medications        Information about where to get these medications is not yet available    Ask your nurse or doctor about these medications  folic acid 1 MG tablet          Future Appointments   Date Time Provider Department Center   1/10/2024  2:30 PM Margarita Woods APRN [facility]     Additional Instructions for the Follow-ups that You Need to Schedule       Discharge Follow-up with Specialty: Nephrology for scheduled dialysis, PCP in 1-2 weeks, other specialists as previously recomended   As directed      Specialty: Nephrology for scheduled dialysis, PCP in 1-2 weeks, other specialists as previously recomended               Follow-up Information       Margarita Woods APRN .    Specialties: Nurse Practitioner, Internal Medicine, Family Medicine  Contact information:  [address] [facility] [address] [phone]                             TEST  RESULTS PENDING AT DISCHARGE         Daniel Sims MD  [place] [facility]  Melissa  01/07/24  10:48 EST      Time: greater than 32 minutes on discharge  It was a pleasure taking care of this patient while in the hospital.

## 2024-01-08 ENCOUNTER — TRANSITIONAL CARE MANAGEMENT TELEPHONE ENCOUNTER (OUTPATIENT)
Dept: CALL CENTER | Facility: HOSPITAL | Age: 34
End: 2024-01-08
Payer: MEDICARE

## 2024-01-08 NOTE — OUTREACH NOTE
Call Center TCM Note      Flowsheet Row Responses   [facility] patient discharged from? [place]   Does the patient have one of the following disease processes/diagnoses(primary or secondary)? Other   TCM attempt successful? Yes   Call end time 1256   Discharge diagnosis *Symptomatic anemia   Person spoke with today (if not patient) and relationship patient   Meds reviewed with patient/caregiver? Yes   Does the patient have all medications ordered at discharge? Yes   Prescription comments no concerns or questions noted.   Is the patient taking all medications as directed (includes completed medication regime)? Yes   Comments 1/10/2024  2:30 PM  HOSPITAL FOLLOW UP 30 min [facility] Margarita Woods APRN   Does the patient have an appointment with their PCP within 7-14 days of discharge? Yes   Has home health visited the patient within 72 hours of discharge? N/A   Psychosocial issues? No   Did the patient receive a copy of their discharge instructions? Yes   Nursing interventions Reviewed instructions with patient   What is the patient's perception of their health status since discharge? Improving   Is the patient/caregiver able to teach back signs and symptoms related to disease process for when to call PCP? Yes   Is the patient/caregiver able to teach back signs and symptoms related to disease process for when to call 911? Yes   Is the patient/caregiver able to teach back the hierarchy of who to call/visit for symptoms/problems? PCP, Specialist, Home health nurse, Urgent Care, ED, 911 Yes   TCM call completed? Yes   Wrap up additional comments Patient reports doing well no concerns or questions noted.   Call end time 1256   Would this patient benefit from a Referral to Amb Social Work? No   Is the patient interested in additional calls from an ambulatory ? No            Roberta Cha RN    1/8/2024, 12:56 EST

## 2024-01-10 ENCOUNTER — TELEPHONE (OUTPATIENT)
Dept: FAMILY MEDICINE CLINIC | Facility: CLINIC | Age: 34
End: 2024-01-10

## 2024-01-10 ENCOUNTER — OFFICE VISIT (OUTPATIENT)
Dept: FAMILY MEDICINE CLINIC | Facility: CLINIC | Age: 34
End: 2024-01-10
Payer: MEDICARE

## 2024-01-10 VITALS
SYSTOLIC BLOOD PRESSURE: 122 MMHG | TEMPERATURE: 98.8 F | HEIGHT: 64 IN | BODY MASS INDEX: 17.93 KG/M2 | OXYGEN SATURATION: 100 % | WEIGHT: 105 LBS | DIASTOLIC BLOOD PRESSURE: 78 MMHG | HEART RATE: 76 BPM

## 2024-01-10 DIAGNOSIS — R53.1 WEAKNESS GENERALIZED: ICD-10-CM

## 2024-01-10 DIAGNOSIS — E53.8 FOLATE DEFICIENCY: ICD-10-CM

## 2024-01-10 DIAGNOSIS — D50.9 IRON DEFICIENCY ANEMIA, UNSPECIFIED IRON DEFICIENCY ANEMIA TYPE: ICD-10-CM

## 2024-01-10 DIAGNOSIS — M32.14 LUPUS NEPHRITIS, ISN/RPS CLASS IV: ICD-10-CM

## 2024-01-10 DIAGNOSIS — Z09 HOSPITAL DISCHARGE FOLLOW-UP: Primary | ICD-10-CM

## 2024-01-10 DIAGNOSIS — M54.50 BILATERAL LOW BACK PAIN, UNSPECIFIED CHRONICITY, UNSPECIFIED WHETHER SCIATICA PRESENT: ICD-10-CM

## 2024-01-10 DIAGNOSIS — M32.14 OTHER SYSTEMIC LUPUS ERYTHEMATOSUS WITH GLOMERULAR DISEASE: ICD-10-CM

## 2024-01-10 DIAGNOSIS — N18.6 END STAGE RENAL DISEASE ON DIALYSIS: ICD-10-CM

## 2024-01-10 DIAGNOSIS — Z99.2 END STAGE RENAL DISEASE ON DIALYSIS: ICD-10-CM

## 2024-01-10 RX ORDER — FOLIC ACID 1 MG/1
1 TABLET ORAL DAILY
Qty: 90 TABLET | Refills: 1 | Status: SHIPPED | OUTPATIENT
Start: 2024-01-10

## 2024-01-10 RX ORDER — OXYCODONE HYDROCHLORIDE 5 MG/1
5 TABLET ORAL EVERY 6 HOURS PRN
COMMUNITY
Start: 2023-09-14

## 2024-01-10 RX ORDER — BUDESONIDE AND FORMOTEROL FUMARATE DIHYDRATE 160; 4.5 UG/1; UG/1
2 AEROSOL RESPIRATORY (INHALATION) 2 TIMES DAILY
COMMUNITY
Start: 2023-09-24 | End: 2024-01-10

## 2024-01-10 NOTE — TELEPHONE ENCOUNTER
Caller: RAYMOND WITH [employer] [facility]    Best call back number: [phone]     What orders are you requesting (i.e. lab or imaging): VERBAL ORDERS FOR PHYSICAL THERAPY AND A FAX NOTE OF ORDERS FOR PHYSICAL THERAPY      Additional notes: FAX IS - [phone]

## 2024-01-10 NOTE — PROGRESS NOTES
Transitional Care Follow Up Visit  Subjective     Dee Esparzaes is a 33 y.o. female who presents for a transitional care management visit.    Within 48 business hours after discharge our office contacted her via telephone to coordinate her care and needs.      I reviewed and discussed the details of that call along with the discharge summary, hospital problems, inpatient lab results, inpatient diagnostic studies, and consultation reports with Dee.     Current outpatient and discharge medications have been reconciled for the patient.  Reviewed by: Margarita Woods, CAESAR          1/7/2024     3:43 PM   Date of TCM Phone Call   [facility]   Date of Admission 1/6/2024   Date of Discharge 1/7/2024   Discharge Disposition Home or Self Care     Risk for Readmission (LACE) Score: 8 (1/7/2024  6:00 AM)      History of Present Illness   Course During Hospital Stay:  32 yo with history of ESRD secondary to lupus nephritis now on HD instead of PD, recent aortic dissection s/p repair on 11/2 with complicated postoperative course resulting in cardiac tamponade and reexploration and evacuation of hematoma 4 days later.  Subsequently patient had prolonged decline in mental status and was followed by nephrology cardiology and psychiatry and infectious disease service.  She was eventually able to recover mentally enough to be eventually discharged on 12/9/2023. She presents with dyspnea and fatigue and found to have Hgb of 6. Her folic acid is low at 2.9. Other labs c/w iron deficiency anemia. S/p blood transfusion with improvement in Hgb. She had dialysis. She wishes for discharge today. No signs/symptoms of bleeding.      She was given 2 units blood in the hospital.  She denies abnormal bleeding, chest pain and SOB.  She reports getting tired here and there will take a nap and then feels better after nap.    She has followed up with Dr. Mckeon and is going to dialysis on Tuesday, Thursday and  "Saturday.  She was last dialyzed yesterday.    Folate 1mg daily (OTC) recommended at hospital discharge.  Patient has not picked up the over the counter Folate 1mg tablet.  Discussed with patient the importance of taking this supplement and she agreed Advised patient that I would send this prescription to her pharmacy today.     Today patient is present with her mother.  Patient reports is experiencing pain in bilateral sides just below ribcage and in her lower back.  Her pain feels like aching pain that is constant.  Her mother indicated that patient has always had pain since she was diagnosed with Lupus.  Patient and mother stated she is taking Oxycodone 5mg Q6H PRN for chronic pain from Lupus.  She is also taking her Hydroxychloroquine as directed for Lupus.  Recommended patient call Db Hong MD to request refill of Oxycodone regarding her chronic pain- related to Lupus.  Provided patient with Dr. Man's phone number [phone].    Weakness - patient's mother stated that patient is weak and is having difficulty walking up and down stairs at home.  Recommended referral to home health for physical therapy/occupational therapy and mother and patient agreed.  Mother stated that [facility] physical/occupational therapy was order during prior hospital discharge but  patient has not connected with them yet.      The following portions of the patient's history were reviewed and updated as appropriate: allergies, current medications, past family history, past medical history, past social history, past surgical history, and problem list.  .    Review of Systems    Objective     Vitals:    01/10/24 1443 01/10/24 1544   BP: 122/78    BP Location: Right arm    Patient Position: Sitting    Cuff Size: Adult    Pulse:  76   Temp:  98.8 °F (37.1 °C)   TempSrc:  Oral   SpO2:  100%   Weight: 47.6 kg (105 lb)    Height: 162.6 cm (64.02\")       Physical Exam  Vitals and nursing note reviewed. "   Constitutional:       General: She is not in acute distress.     Appearance: She is not diaphoretic.      Comments: Chronically ill appearing   HENT:      Head: Normocephalic and atraumatic.   Cardiovascular:      Rate and Rhythm: Normal rate and regular rhythm.      Heart sounds: Normal heart sounds.   Pulmonary:      Effort: Pulmonary effort is normal. No respiratory distress.      Breath sounds: Normal breath sounds. No wheezing, rhonchi or rales.   Musculoskeletal:      Lumbar back: Tenderness present. No edema. Normal range of motion.      Right lower leg: No edema.      Left lower leg: No edema.      Comments: C/O mild tenderness on palpation over lower back on palpation.  Generalized weakness.  Normal ROM.   Skin:     General: Skin is warm and dry.      Findings: No erythema.   Neurological:      Mental Status: She is alert.      Motor: Weakness present.      Comments: Generalized weakness   Psychiatric:         Mood and Affect: Mood normal.         Assessment & Plan   Diagnoses and all orders for this visit:    1. Hospital discharge follow-up (Primary)    2. Iron deficiency anemia, unspecified iron deficiency anemia type  -     folic acid (FOLVITE) 1 MG tablet; Take 1 tablet by mouth Daily.  Dispense: 90 tablet; Refill: 1  -     CBC w AUTO Differential    3. Folate deficiency  -     folic acid (FOLVITE) 1 MG tablet; Take 1 tablet by mouth Daily.  Dispense: 90 tablet; Refill: 1    4. End stage renal disease on dialysis  Comments:  On Dialysis T, Th, Sat.  Last dialyzed yesterday.  Followed by Dr. Mckeon, Nephrology.    5. Other systemic lupus erythematosus with glomerular disease  Assessment & Plan:  Followed by Dr. Padgett.      6. Lupus nephritis, ISN/RPS class IV  Assessment & Plan:  Followed by Dr. Padgett.      7. Bilateral low back pain, unspecified chronicity, unspecified whether sciatica present  Assessment & Plan:  c/o of low back pain; Mother stated patient has had chronic pain since  diagnosed with Lupus.  Patient reports taking Oxycodone for pain control.  Rec she f/u w/Db Padgett MD regarding her chronic pain r/t Lupus.  Provided patient with Dr. Padgett's phone number [phone]. 8. Weakness generalized  Assessment & Plan:  C/O difficulty going up and down stairs in home due to weakness.  Hospital discharge order placed for [facility] - PT/OT for evaluation and treatment.  Encouraged Home Health PT/OT.

## 2024-01-11 PROBLEM — M54.50 BILATERAL LOW BACK PAIN: Status: ACTIVE | Noted: 2024-01-11

## 2024-01-11 PROBLEM — R53.1 WEAKNESS GENERALIZED: Status: ACTIVE | Noted: 2024-01-11

## 2024-01-11 LAB
BASOPHILS # BLD AUTO: 0.02 10*3/MM3 (ref 0–0.2)
BASOPHILS NFR BLD AUTO: 0.3 % (ref 0–1.5)
EOSINOPHIL # BLD AUTO: 0.14 10*3/MM3 (ref 0–0.4)
EOSINOPHIL NFR BLD AUTO: 2 % (ref 0.3–6.2)
ERYTHROCYTE [DISTWIDTH] IN BLOOD BY AUTOMATED COUNT: 14.7 % (ref 12.3–15.4)
HCT VFR BLD AUTO: 27.8 % (ref 34–46.6)
HGB BLD-MCNC: 8.8 G/DL (ref 12–15.9)
IMM GRANULOCYTES # BLD AUTO: 0.02 10*3/MM3 (ref 0–0.05)
IMM GRANULOCYTES NFR BLD AUTO: 0.3 % (ref 0–0.5)
LYMPHOCYTES # BLD AUTO: 1 10*3/MM3 (ref 0.7–3.1)
LYMPHOCYTES NFR BLD AUTO: 14 % (ref 19.6–45.3)
MCH RBC QN AUTO: 26 PG (ref 26.6–33)
MCHC RBC AUTO-ENTMCNC: 31.7 G/DL (ref 31.5–35.7)
MCV RBC AUTO: 82 FL (ref 79–97)
MONOCYTES # BLD AUTO: 0.93 10*3/MM3 (ref 0.1–0.9)
MONOCYTES NFR BLD AUTO: 13 % (ref 5–12)
NEUTROPHILS # BLD AUTO: 5.02 10*3/MM3 (ref 1.7–7)
NEUTROPHILS NFR BLD AUTO: 70.4 % (ref 42.7–76)
NRBC BLD AUTO-RTO: 0 /100 WBC (ref 0–0.2)
PLATELET # BLD AUTO: 242 10*3/MM3 (ref 140–450)
RBC # BLD AUTO: 3.39 10*6/MM3 (ref 3.77–5.28)
WBC # BLD AUTO: 7.13 10*3/MM3 (ref 3.4–10.8)

## 2024-01-11 NOTE — TELEPHONE ENCOUNTER
Called and left message for Genaro to return call regarding home health verbal order.  Awaiting return call.

## 2024-01-11 NOTE — ASSESSMENT & PLAN NOTE
c/o of low back pain; Mother stated patient has had chronic pain since diagnosed with Lupus.  Patient reports taking Oxycodone for pain control.  Rec she f/u w/Db Padgett MD regarding her chronic pain r/t Lupus.  Provided patient with Dr. Padgett's phone number [phone].

## 2024-01-11 NOTE — TELEPHONE ENCOUNTER
Hub staff attempted to follow warm transfer process and was unsuccessful     Caller: RAYMOND WITH [facility]    Relationship to patient:     Best call back number: [phone]    Patient is needing: CALLING BACK TO CHECK STATUS

## 2024-01-11 NOTE — ASSESSMENT & PLAN NOTE
C/O difficulty going up and down stairs in home due to weakness.  Hospital discharge order placed for [facility] - PT/OT for evaluation and treatment.  Encouraged Home Health PT/OT.

## 2024-01-15 ENCOUNTER — TELEPHONE (OUTPATIENT)
Dept: FAMILY MEDICINE CLINIC | Facility: CLINIC | Age: 34
End: 2024-01-15

## 2024-01-17 NOTE — TELEPHONE ENCOUNTER
Caller: HerreraDee    Relationship: Self    Best call back number: [phone]     Requested Prescriptions:   Requested Prescriptions     Pending Prescriptions Disp Refills    oxyCODONE (ROXICODONE) 5 MG immediate release tablet       Sig: Take 1 tablet by mouth Every 6 (Six) Hours As Needed for Severe Pain.        Pharmacy where request should be sent: [id number] [address] AT [facility] [phone] [facility] [phone]      Last office visit with prescribing clinician: 1/10/2024   Last telemedicine visit with prescribing clinician: Visit date not found   Next office visit with prescribing clinician: 2/12/2024     Additional details provided by patient: PATIENT WOULD LIKE TO REQUEST AN INCREASE IN THE DOSAGE.     PATIENT IS OUT OF THIS MEDICATION. PLEASE CALL  IF AND WHEN REFILL IS SENT TO PHARMACY.     Does the patient have less than a 3 day supply:  [x] Yes  [] No    Would you like a call back once the refill request has been completed: [] Yes [x] No    If the office needs to give you a call back, can they leave a voicemail: [x] Yes [] No    Ara Arnold Rep   01/17/24 14:37 EST

## 2024-01-19 RX ORDER — OXYCODONE HYDROCHLORIDE 5 MG/1
5 TABLET ORAL EVERY 6 HOURS PRN
OUTPATIENT
Start: 2024-01-19

## 2024-01-22 RX ORDER — OXYCODONE HYDROCHLORIDE 5 MG/1
5 TABLET ORAL EVERY 6 HOURS PRN
OUTPATIENT
Start: 2024-01-22

## 2024-01-22 NOTE — TELEPHONE ENCOUNTER
Rx Refill Note  Requested Prescriptions     Pending Prescriptions Disp Refills    oxyCODONE (ROXICODONE) 5 MG immediate release tablet       Sig: Take 1 tablet by mouth Every 6 (Six) Hours As Needed for Severe Pain.      Last office visit with prescribing clinician: 1/10/2024   Last telemedicine visit with prescribing clinician: Visit date not found   Next office visit with prescribing clinician: 2/12/2024                         Would you like a call back once the refill request has been completed: [] Yes [] No    If the office needs to give you a call back, can they leave a voicemail: [] Yes [] No    Cheyenne Kim MA  01/22/24, 08:52 EST

## 2024-01-23 ENCOUNTER — PATIENT MESSAGE (OUTPATIENT)
Dept: OBSTETRICS AND GYNECOLOGY | Age: 34
End: 2024-01-23
Payer: MEDICARE

## 2024-01-23 NOTE — TELEPHONE ENCOUNTER
From: Dee Herrera  To: Winnie Sanchez  Sent: 1/23/2024 12:19 PM EST  Subject: Pain    Are you able to proscribe me pain medication Tylenol ibuprofen midol nothing is working for the pain in my back sides and feet

## 2024-01-25 RX ORDER — OXYCODONE HYDROCHLORIDE 5 MG/1
5 TABLET ORAL EVERY 6 HOURS PRN
OUTPATIENT
Start: 2024-01-25

## 2024-01-26 ENCOUNTER — APPOINTMENT (OUTPATIENT)
Dept: CT IMAGING | Facility: HOSPITAL | Age: 34
End: 2024-01-26
Payer: MEDICARE

## 2024-01-26 ENCOUNTER — APPOINTMENT (OUTPATIENT)
Dept: CARDIOLOGY | Facility: HOSPITAL | Age: 34
End: 2024-01-26
Payer: MEDICARE

## 2024-01-26 ENCOUNTER — APPOINTMENT (OUTPATIENT)
Dept: GENERAL RADIOLOGY | Facility: HOSPITAL | Age: 34
End: 2024-01-26
Payer: MEDICARE

## 2024-01-26 ENCOUNTER — HOSPITAL ENCOUNTER (INPATIENT)
Facility: HOSPITAL | Age: 34
LOS: 3 days | Discharge: HOME | End: 2024-01-29
Attending: EMERGENCY MEDICINE | Admitting: HOSPITALIST
Payer: MEDICARE

## 2024-01-26 DIAGNOSIS — N18.6 ESRD (END STAGE RENAL DISEASE): ICD-10-CM

## 2024-01-26 DIAGNOSIS — D63.1 ANEMIA DUE TO CHRONIC KIDNEY DISEASE, UNSPECIFIED CKD STAGE: ICD-10-CM

## 2024-01-26 DIAGNOSIS — K85.90 ACUTE PANCREATITIS, UNSPECIFIED COMPLICATION STATUS, UNSPECIFIED PANCREATITIS TYPE: Primary | ICD-10-CM

## 2024-01-26 DIAGNOSIS — M79.89 SWELLING OF RIGHT FOOT: ICD-10-CM

## 2024-01-26 DIAGNOSIS — N18.9 ANEMIA DUE TO CHRONIC KIDNEY DISEASE, UNSPECIFIED CKD STAGE: ICD-10-CM

## 2024-01-26 LAB
ALBUMIN SERPL-MCNC: 2.6 G/DL (ref 3.5–5.2)
ALBUMIN/GLOB SERPL: 0.7 G/DL
ALP SERPL-CCNC: 65 U/L (ref 39–117)
ALT SERPL W P-5'-P-CCNC: 7 U/L (ref 1–33)
ANION GAP SERPL CALCULATED.3IONS-SCNC: 9.6 MMOL/L (ref 5–15)
AST SERPL-CCNC: 13 U/L (ref 1–32)
B PARAPERT DNA SPEC QL NAA+PROBE: NOT DETECTED
B PERT DNA SPEC QL NAA+PROBE: NOT DETECTED
BASOPHILS # BLD AUTO: 0.01 10*3/MM3 (ref 0–0.2)
BASOPHILS NFR BLD AUTO: 0.2 % (ref 0–1.5)
BH CV LOWER VASCULAR LEFT COMMON FEMORAL AUGMENT: NORMAL
BH CV LOWER VASCULAR LEFT COMMON FEMORAL COMPETENT: NORMAL
BH CV LOWER VASCULAR LEFT COMMON FEMORAL COMPRESS: NORMAL
BH CV LOWER VASCULAR LEFT COMMON FEMORAL PHASIC: NORMAL
BH CV LOWER VASCULAR LEFT COMMON FEMORAL SPONT: NORMAL
BH CV LOWER VASCULAR RIGHT COMMON FEMORAL AUGMENT: NORMAL
BH CV LOWER VASCULAR RIGHT COMMON FEMORAL COMPETENT: NORMAL
BH CV LOWER VASCULAR RIGHT COMMON FEMORAL COMPRESS: NORMAL
BH CV LOWER VASCULAR RIGHT COMMON FEMORAL PHASIC: NORMAL
BH CV LOWER VASCULAR RIGHT COMMON FEMORAL SPONT: NORMAL
BH CV LOWER VASCULAR RIGHT DISTAL FEMORAL COMPRESS: NORMAL
BH CV LOWER VASCULAR RIGHT GASTRONEMIUS COMPRESS: NORMAL
BH CV LOWER VASCULAR RIGHT GREATER SAPH AK COMPRESS: NORMAL
BH CV LOWER VASCULAR RIGHT GREATER SAPH BK COMPRESS: NORMAL
BH CV LOWER VASCULAR RIGHT LESSER SAPH COMPRESS: NORMAL
BH CV LOWER VASCULAR RIGHT MID FEMORAL AUGMENT: NORMAL
BH CV LOWER VASCULAR RIGHT MID FEMORAL COMPETENT: NORMAL
BH CV LOWER VASCULAR RIGHT MID FEMORAL COMPRESS: NORMAL
BH CV LOWER VASCULAR RIGHT MID FEMORAL PHASIC: NORMAL
BH CV LOWER VASCULAR RIGHT MID FEMORAL SPONT: NORMAL
BH CV LOWER VASCULAR RIGHT PERONEAL COMPRESS: NORMAL
BH CV LOWER VASCULAR RIGHT POPLITEAL AUGMENT: NORMAL
BH CV LOWER VASCULAR RIGHT POPLITEAL COMPETENT: NORMAL
BH CV LOWER VASCULAR RIGHT POPLITEAL COMPRESS: NORMAL
BH CV LOWER VASCULAR RIGHT POPLITEAL PHASIC: NORMAL
BH CV LOWER VASCULAR RIGHT POPLITEAL SPONT: NORMAL
BH CV LOWER VASCULAR RIGHT POSTERIOR TIBIAL COMPRESS: NORMAL
BH CV LOWER VASCULAR RIGHT PROFUNDA FEMORAL COMPRESS: NORMAL
BH CV LOWER VASCULAR RIGHT PROXIMAL FEMORAL COMPRESS: NORMAL
BH CV LOWER VASCULAR RIGHT SAPHENOFEMORAL JUNCTION COMPRESS: NORMAL
BH CV VAS PRELIMINARY FINDINGS SCRIPTING: 1
BILIRUB SERPL-MCNC: 0.4 MG/DL (ref 0–1.2)
BUN SERPL-MCNC: 17 MG/DL (ref 6–20)
BUN/CREAT SERPL: 2.7 (ref 7–25)
C PNEUM DNA NPH QL NAA+NON-PROBE: NOT DETECTED
CALCIUM SPEC-SCNC: 8.8 MG/DL (ref 8.6–10.5)
CHLORIDE SERPL-SCNC: 98 MMOL/L (ref 98–107)
CO2 SERPL-SCNC: 27.4 MMOL/L (ref 22–29)
CREAT SERPL-MCNC: 6.38 MG/DL (ref 0.57–1)
CRP SERPL-MCNC: 10.55 MG/DL (ref 0–0.5)
DEPRECATED RDW RBC AUTO: 45.8 FL (ref 37–54)
EGFRCR SERPLBLD CKD-EPI 2021: 8.4 ML/MIN/1.73
EOSINOPHIL # BLD AUTO: 0.09 10*3/MM3 (ref 0–0.4)
EOSINOPHIL NFR BLD AUTO: 1.7 % (ref 0.3–6.2)
ERYTHROCYTE [DISTWIDTH] IN BLOOD BY AUTOMATED COUNT: 15.5 % (ref 12.3–15.4)
ERYTHROCYTE [SEDIMENTATION RATE] IN BLOOD: 48 MM/HR (ref 0–20)
FLUAV SUBTYP SPEC NAA+PROBE: NOT DETECTED
FLUBV RNA ISLT QL NAA+PROBE: NOT DETECTED
GLOBULIN UR ELPH-MCNC: 3.7 GM/DL
GLUCOSE SERPL-MCNC: 89 MG/DL (ref 65–99)
HADV DNA SPEC NAA+PROBE: NOT DETECTED
HCG SERPL QL: NEGATIVE
HCOV 229E RNA SPEC QL NAA+PROBE: NOT DETECTED
HCOV HKU1 RNA SPEC QL NAA+PROBE: NOT DETECTED
HCOV NL63 RNA SPEC QL NAA+PROBE: NOT DETECTED
HCOV OC43 RNA SPEC QL NAA+PROBE: NOT DETECTED
HCT VFR BLD AUTO: 23 % (ref 34–46.6)
HGB BLD-MCNC: 7.2 G/DL (ref 12–15.9)
HMPV RNA NPH QL NAA+NON-PROBE: NOT DETECTED
HPIV1 RNA ISLT QL NAA+PROBE: NOT DETECTED
HPIV2 RNA SPEC QL NAA+PROBE: NOT DETECTED
HPIV3 RNA NPH QL NAA+PROBE: NOT DETECTED
HPIV4 P GENE NPH QL NAA+PROBE: NOT DETECTED
IMM GRANULOCYTES # BLD AUTO: 0.02 10*3/MM3 (ref 0–0.05)
IMM GRANULOCYTES NFR BLD AUTO: 0.4 % (ref 0–0.5)
LIPASE SERPL-CCNC: 295 U/L (ref 13–60)
LYMPHOCYTES # BLD AUTO: 0.58 10*3/MM3 (ref 0.7–3.1)
LYMPHOCYTES NFR BLD AUTO: 10.6 % (ref 19.6–45.3)
M PNEUMO IGG SER IA-ACNC: NOT DETECTED
MCH RBC QN AUTO: 25.7 PG (ref 26.6–33)
MCHC RBC AUTO-ENTMCNC: 31.3 G/DL (ref 31.5–35.7)
MCV RBC AUTO: 82.1 FL (ref 79–97)
MONOCYTES # BLD AUTO: 0.78 10*3/MM3 (ref 0.1–0.9)
MONOCYTES NFR BLD AUTO: 14.3 % (ref 5–12)
NEUTROPHILS NFR BLD AUTO: 3.97 10*3/MM3 (ref 1.7–7)
NEUTROPHILS NFR BLD AUTO: 72.8 % (ref 42.7–76)
NRBC BLD AUTO-RTO: 0 /100 WBC (ref 0–0.2)
PLATELET # BLD AUTO: 168 10*3/MM3 (ref 140–450)
PMV BLD AUTO: 9.6 FL (ref 6–12)
POTASSIUM SERPL-SCNC: 4 MMOL/L (ref 3.5–5.2)
PROT SERPL-MCNC: 6.3 G/DL (ref 6–8.5)
RBC # BLD AUTO: 2.8 10*6/MM3 (ref 3.77–5.28)
RHINOVIRUS RNA SPEC NAA+PROBE: NOT DETECTED
RSV RNA NPH QL NAA+NON-PROBE: NOT DETECTED
SARS-COV-2 RNA NPH QL NAA+NON-PROBE: NOT DETECTED
SODIUM SERPL-SCNC: 135 MMOL/L (ref 136–145)
URATE SERPL-MCNC: 3.6 MG/DL (ref 2.4–5.7)
WBC NRBC COR # BLD AUTO: 5.45 10*3/MM3 (ref 3.4–10.8)

## 2024-01-26 PROCEDURE — 25010000002 HYDROMORPHONE PER 4 MG: Performed by: EMERGENCY MEDICINE

## 2024-01-26 PROCEDURE — 74176 CT ABD & PELVIS W/O CONTRAST: CPT

## 2024-01-26 PROCEDURE — 25010000002 LABETALOL 5 MG/ML SOLUTION: Performed by: EMERGENCY MEDICINE

## 2024-01-26 PROCEDURE — 80053 COMPREHEN METABOLIC PANEL: CPT | Performed by: EMERGENCY MEDICINE

## 2024-01-26 PROCEDURE — 73630 X-RAY EXAM OF FOOT: CPT

## 2024-01-26 PROCEDURE — 25010000002 HYDROMORPHONE 1 MG/ML SOLUTION: Performed by: HOSPITALIST

## 2024-01-26 PROCEDURE — 85025 COMPLETE CBC W/AUTO DIFF WBC: CPT | Performed by: EMERGENCY MEDICINE

## 2024-01-26 PROCEDURE — 86140 C-REACTIVE PROTEIN: CPT | Performed by: HOSPITALIST

## 2024-01-26 PROCEDURE — 25010000002 ONDANSETRON PER 1 MG: Performed by: EMERGENCY MEDICINE

## 2024-01-26 PROCEDURE — 99285 EMERGENCY DEPT VISIT HI MDM: CPT

## 2024-01-26 PROCEDURE — 93971 EXTREMITY STUDY: CPT

## 2024-01-26 PROCEDURE — 0202U NFCT DS 22 TRGT SARS-COV-2: CPT | Performed by: NURSE PRACTITIONER

## 2024-01-26 PROCEDURE — 84703 CHORIONIC GONADOTROPIN ASSAY: CPT | Performed by: EMERGENCY MEDICINE

## 2024-01-26 PROCEDURE — 84550 ASSAY OF BLOOD/URIC ACID: CPT | Performed by: HOSPITALIST

## 2024-01-26 PROCEDURE — 83690 ASSAY OF LIPASE: CPT | Performed by: EMERGENCY MEDICINE

## 2024-01-26 PROCEDURE — 85652 RBC SED RATE AUTOMATED: CPT | Performed by: HOSPITALIST

## 2024-01-26 RX ORDER — ACETAMINOPHEN 325 MG/1
650 TABLET ORAL EVERY 4 HOURS PRN
Status: DISCONTINUED | OUTPATIENT
Start: 2024-01-26 | End: 2024-01-29 | Stop reason: HOSPADM

## 2024-01-26 RX ORDER — ONDANSETRON 2 MG/ML
4 INJECTION INTRAMUSCULAR; INTRAVENOUS EVERY 6 HOURS PRN
Status: DISCONTINUED | OUTPATIENT
Start: 2024-01-26 | End: 2024-01-29 | Stop reason: HOSPADM

## 2024-01-26 RX ORDER — NITROGLYCERIN 0.4 MG/1
0.4 TABLET SUBLINGUAL
Status: DISCONTINUED | OUTPATIENT
Start: 2024-01-26 | End: 2024-01-29 | Stop reason: HOSPADM

## 2024-01-26 RX ORDER — HYDROMORPHONE HYDROCHLORIDE 1 MG/ML
0.5 INJECTION, SOLUTION INTRAMUSCULAR; INTRAVENOUS; SUBCUTANEOUS ONCE
Status: COMPLETED | OUTPATIENT
Start: 2024-01-26 | End: 2024-01-26

## 2024-01-26 RX ORDER — LABETALOL HYDROCHLORIDE 5 MG/ML
10 INJECTION, SOLUTION INTRAVENOUS ONCE
Status: COMPLETED | OUTPATIENT
Start: 2024-01-26 | End: 2024-01-26

## 2024-01-26 RX ORDER — SODIUM CHLORIDE 0.9 % (FLUSH) 0.9 %
10 SYRINGE (ML) INJECTION AS NEEDED
Status: DISCONTINUED | OUTPATIENT
Start: 2024-01-26 | End: 2024-01-29 | Stop reason: HOSPADM

## 2024-01-26 RX ORDER — VANCOMYCIN HYDROCHLORIDE 1 G/200ML
20 INJECTION, SOLUTION INTRAVENOUS ONCE
Status: COMPLETED | OUTPATIENT
Start: 2024-01-26 | End: 2024-01-27

## 2024-01-26 RX ORDER — HEPARIN SODIUM 5000 [USP'U]/ML
5000 INJECTION, SOLUTION INTRAVENOUS; SUBCUTANEOUS EVERY 8 HOURS SCHEDULED
Status: DISCONTINUED | OUTPATIENT
Start: 2024-01-26 | End: 2024-01-26

## 2024-01-26 RX ORDER — ONDANSETRON 2 MG/ML
4 INJECTION INTRAMUSCULAR; INTRAVENOUS ONCE
Status: COMPLETED | OUTPATIENT
Start: 2024-01-26 | End: 2024-01-26

## 2024-01-26 RX ORDER — HYDROMORPHONE HYDROCHLORIDE 1 MG/ML
0.5 INJECTION, SOLUTION INTRAMUSCULAR; INTRAVENOUS; SUBCUTANEOUS
Status: DISCONTINUED | OUTPATIENT
Start: 2024-01-26 | End: 2024-01-26

## 2024-01-26 RX ORDER — LABETALOL HYDROCHLORIDE 5 MG/ML
20 INJECTION, SOLUTION INTRAVENOUS
Status: DISCONTINUED | OUTPATIENT
Start: 2024-01-26 | End: 2024-01-29 | Stop reason: HOSPADM

## 2024-01-26 RX ORDER — ONDANSETRON 4 MG/1
4 TABLET, ORALLY DISINTEGRATING ORAL EVERY 6 HOURS PRN
Status: DISCONTINUED | OUTPATIENT
Start: 2024-01-26 | End: 2024-01-29 | Stop reason: HOSPADM

## 2024-01-26 RX ORDER — ALUMINA, MAGNESIA, AND SIMETHICONE 2400; 2400; 240 MG/30ML; MG/30ML; MG/30ML
15 SUSPENSION ORAL EVERY 6 HOURS PRN
Status: DISCONTINUED | OUTPATIENT
Start: 2024-01-26 | End: 2024-01-29 | Stop reason: HOSPADM

## 2024-01-26 RX ADMIN — HYDROMORPHONE HYDROCHLORIDE 0.5 MG: 1 INJECTION, SOLUTION INTRAMUSCULAR; INTRAVENOUS; SUBCUTANEOUS at 20:07

## 2024-01-26 RX ADMIN — ONDANSETRON HYDROCHLORIDE 4 MG: 2 INJECTION, SOLUTION INTRAMUSCULAR; INTRAVENOUS at 16:54

## 2024-01-26 RX ADMIN — HYDROMORPHONE HYDROCHLORIDE 1 MG: 1 INJECTION, SOLUTION INTRAMUSCULAR; INTRAVENOUS; SUBCUTANEOUS at 23:17

## 2024-01-26 RX ADMIN — LABETALOL HYDROCHLORIDE 10 MG: 5 INJECTION, SOLUTION INTRAVENOUS at 20:09

## 2024-01-26 RX ADMIN — HYDROMORPHONE HYDROCHLORIDE 0.5 MG: 1 INJECTION, SOLUTION INTRAMUSCULAR; INTRAVENOUS; SUBCUTANEOUS at 16:54

## 2024-01-26 RX ADMIN — ACETAMINOPHEN 650 MG: 325 TABLET, FILM COATED ORAL at 21:48

## 2024-01-26 NOTE — Clinical Note
Level of Care: Telemetry [5]  Diagnosis: Acute pancreatitis [577.0.ICD-9-CM]  Admitting Physician: RADHA KOENIG [[id number]]  Attending Physician: RADHA KOENIG [[id number]]

## 2024-01-26 NOTE — ED PROVIDER NOTES
EMERGENCY DEPARTMENT ENCOUNTER    Room Number:  30/30  PCP: Margarita Woods APRN  Historian: Patient      HPI:  Chief Complaint: Abdominal pain nausea vomiting right foot pain  A complete HPI/ROS/PMH/PSH/SH/FH are unobtainable due to: None  Context: Dee Herrera is a 33 y.o. female who presents to the ED c/o abdominal pain nausea vomiting right foot pain.  Patient has history of lupus nephritis.  Patient states she dialyzes Tuesday Thursday Saturday and did dialyze yesterday.  Patient states she has been having abdominal pain for several days.  Patient has been out of her oxycodone.  Appears in no position is willing to refill it for her.  Patient has had no fevers or chills.  States abdomen pain is diffuse            PAST MEDICAL HISTORY  Active Ambulatory Problems     Diagnosis Date Noted    Vitamin D deficiency 09/27/2021    Iron deficiency anemia 09/27/2021    Morning stiffness of joints 04/21/2022    Iron deficiency anemia, unspecified iron deficiency anemia type 07/11/2022    Thrombocytopenia 07/20/2022    Acute renal failure (ARF) 07/20/2022    Hypertension secondary to other renal disorders 07/20/2022    Pancytopenia 07/20/2022    Hypoalbuminemia 07/20/2022    Volume overload 07/20/2022    Ear drainage right 07/20/2022    T.T.P. 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 difficile colitis 02/14/2023    Anemia, chronic disease 02/18/2023     Essential hypertension 02/18/2023    Peritoneal dialysis catheter in place 04/03/2023    Anemia due to chronic kidney disease, on chronic dialysis 04/03/2023    Alternating constipation and diarrhea 05/23/2023    Abnormal stress test 05/26/2023    Hyponatremia 10/26/2023    Poor appetite 10/26/2023    Moderate malnutrition 10/26/2023    Chronic diastolic CHF (congestive heart failure) 10/26/2023    Aortic valve lesion 10/28/2023    Severe aortic valve regurgitation 10/28/2023    Dissecting ascending aortic aneurysm 10/30/2023    Recent cerebrovascular accident (CVA) 11/17/2023    Internal jugular (IJ) vein thromboembolism, chronic 12/04/2023    Acute heart failure with preserved ejection fraction (HFpEF) 12/09/2023    Acute on chronic anemia 01/06/2024    Symptomatic anemia 01/06/2024    Weakness generalized 01/11/2024    Bilateral low back pain 01/11/2024     Resolved Ambulatory Problems     Diagnosis Date Noted    Anemia, unspecified type 04/21/2022    Elevated troponin 07/20/2022    Nausea and vomiting 07/20/2022    Hypertensive urgency 07/20/2022    Combined systolic and diastolic congestive heart failure 12/08/2022    Pericardial effusion 02/01/2023    Amyloid disease 02/03/2023    Hypertensive urgency 02/14/2023    Abdominal pain 02/18/2023     Past Medical History:   Diagnosis Date    Anasarca     Dry skin     ESRD (end stage renal disease) on dialysis     History of abdominal pain     History of anemia     History of transfusion     Hypertension     Lupus (systemic lupus erythematosus) 07/30/2022    Migraine     Other specified nutritional anemias     Renal insufficiency     Seizures     Shortness of breath          PAST SURGICAL HISTORY  Past Surgical History:   Procedure Laterality Date    ASCENDING AORTIC ANEURYSM REPAIR W/ MECHANICAL AORTIC VALVE REPLACEMENT N/A 11/2/2023    Procedure: CHETNA STERNOTOMY, AORTIC ROOT REPLACEMENT WITH VALVE SPARING MISHEL PROCEDURE, REPLACEMENT OF ASCENDING AORTA, RIGHT  FEMORAL DIALYSIS CATHETER PLACEMENT AND PRP;  Surgeon: Chris Medina MD;  Location: [facility] CVOR;  Service: Cardiothoracic;  Laterality: N/A;    CARDIAC CATHETERIZATION N/A 06/14/2023    Procedure: Coronary angiography;  Surgeon: Juana Taylor MD;  Location: [facility] CATH INVASIVE LOCATION;  Service: Cardiovascular;  Laterality: N/A;    CARDIAC CATHETERIZATION N/A 06/14/2023    Procedure: Left heart cath;  Surgeon: Juana Taylor MD;  Location: [facility] CATH INVASIVE LOCATION;  Service: Cardiovascular;  Laterality: N/A;    CARDIAC CATHETERIZATION N/A 06/14/2023    Procedure: Right Heart Cath;  Surgeon: Juana Taylor MD;  Location: [facility] CATH INVASIVE LOCATION;  Service: Cardiovascular;  Laterality: N/A;    COLONOSCOPY N/A 7/20/2023    Procedure: COLONOSCOPY to cecum with biopsy;  Surgeon: Drew Kaminski MD;  Location: [facility] ENDOSCOPY;  Service: Gastroenterology;  Laterality: N/A;  PRE - diarrhea, constipation  POST - fair prep, normal    CORONARY ARTERY BYPASS GRAFT N/A 11/6/2023    Procedure: STERNAL EXPLORATION AND WASH OUT;  Surgeon: Jr Mitesh Quiroz MD;  Location: [facility] CVOR;  Service: Cardiothoracic;  Laterality: N/A;    ENDOSCOPY N/A 7/20/2023    Procedure: ESOPHAGOGASTRODUODENOSCOPY with biopsy;  Surgeon: Drew Kaminski MD;  Location: [facility] ENDOSCOPY;  Service: Gastroenterology;  Laterality: N/A;  PRE - abn ct abd  POST - gastritis    INSERTION HEMODIALYSIS CATHETER N/A 07/26/2022    Procedure: RIGHT TUNNELED DIALYSIS CATHETER PLACEMENT;  Surgeon: Diandra Adhikari MD;  Location: [facility] MAIN OR;  Service: Vascular;  Laterality: N/A;    INSERTION HEMODIALYSIS CATHETER Left 11/29/2023    Procedure: TUNNELED DIALYSIS CATHETER PLACEMENT;  Surgeon: Jose Patel II, MD;  Location: [facility] MAIN OR;  Service: Vascular;  Laterality: Left;    INSERTION PERITONEAL DIALYSIS CATHETER N/A 04/03/2023    Procedure: INSERTION PERITONEAL DIALYSIS CATHETER LAPAROSCOPIC, omentumpexy;  Surgeon: Jemal Loyola  MD Chris;  Location: [facility] MAIN OR;  Service: General;  Laterality: N/A;    REMOVAL PERITONEAL DIALYSIS CATHETER N/A 12/1/2023    Procedure: REMOVAL PERITONEAL DIALYSIS CATHETER;  Surgeon: Maryellen Ashton MD;  Location: [facility] MAIN OR;  Service: General;  Laterality: N/A;    TONSILLECTOMY           FAMILY HISTORY  Family History   Problem Relation Age of Onset    Autoimmune disease Mother     Anemia Mother     Diabetes Sister     Anemia Brother     Diabetes Maternal Grandmother     Hypertension Maternal Grandmother     Cancer Maternal Grandmother     Sickle cell anemia Cousin     Malig Hyperthermia Neg Hx          SOCIAL HISTORY  Social History     Socioeconomic History    Marital status: Single   Tobacco Use    Smoking status: Former     Types: Cigars     Passive exposure: Past    Smokeless tobacco: Never    Tobacco comments:     Patient smoked black & mild   Vaping Use    Vaping Use: Never used   Substance and Sexual Activity    Alcohol use: Yes     Comment: social    Drug use: Yes     Types: Marijuana     Comment: OCCASIONAL    Sexual activity: Not Currently     Partners: Male     Birth control/protection: None         ALLERGIES  Minoxidil        REVIEW OF SYSTEMS  Review of Systems   Abdominal pain      PHYSICAL EXAM  ED Triage Vitals [01/26/24 1604]   Temp Heart Rate Resp BP SpO2   97.8 °F (36.6 °C) 90 20 (!) 165/118 99 %      Temp src Heart Rate Source Patient Position BP Location FiO2 (%)   Tympanic -- Lying Right arm --       Physical Exam      GENERAL: no acute distress.  Appears uncomfortable  HENT: nares patent  EYES: no scleral icterus  CV: regular rhythm, normal rate  RESPIRATORY: normal effort  ABDOMEN: soft.  Mild diffuse tenderness.  Dialysis port left chest  MUSCULOSKELETAL: no deformity  NEURO: alert, moves all extremities, follows commands  PSYCH:  calm, cooperative  SKIN: warm, dry    Vital signs and nursing notes reviewed.          LAB RESULTS  Recent Results (from the past 24 hour(s))    Comprehensive Metabolic Panel    Collection Time: 01/26/24  4:54 PM    Specimen: Arm, Right; Blood   Result Value Ref Range    Glucose 89 65 - 99 mg/dL    BUN 17 6 - 20 mg/dL    Creatinine 6.38 (H) 0.57 - 1.00 mg/dL    Sodium 135 (L) 136 - 145 mmol/L    Potassium 4.0 3.5 - 5.2 mmol/L    Chloride 98 98 - 107 mmol/L    CO2 27.4 22.0 - 29.0 mmol/L    Calcium 8.8 8.6 - 10.5 mg/dL    Total Protein 6.3 6.0 - 8.5 g/dL    Albumin 2.6 (L) 3.5 - 5.2 g/dL    ALT (SGPT) 7 1 - 33 U/L    AST (SGOT) 13 1 - 32 U/L    Alkaline Phosphatase 65 39 - 117 U/L    Total Bilirubin 0.4 0.0 - 1.2 mg/dL    Globulin 3.7 gm/dL    A/G Ratio 0.7 g/dL    BUN/Creatinine Ratio 2.7 (L) 7.0 - 25.0    Anion Gap 9.6 5.0 - 15.0 mmol/L    eGFR 8.4 (L) >60.0 mL/min/1.73   Lipase    Collection Time: 01/26/24  4:54 PM    Specimen: Arm, Right; Blood   Result Value Ref Range    Lipase 295 (H) 13 - 60 U/L   hCG, Serum, Qualitative    Collection Time: 01/26/24  4:54 PM    Specimen: Arm, Right; Blood   Result Value Ref Range    HCG Qualitative Negative Negative   CBC Auto Differential    Collection Time: 01/26/24  4:54 PM    Specimen: Arm, Right; Blood   Result Value Ref Range    WBC 5.45 3.40 - 10.80 10*3/mm3    RBC 2.80 (L) 3.77 - 5.28 10*6/mm3    Hemoglobin 7.2 (L) 12.0 - 15.9 g/dL    Hematocrit 23.0 (L) 34.0 - 46.6 %    MCV 82.1 79.0 - 97.0 fL    MCH 25.7 (L) 26.6 - 33.0 pg    MCHC 31.3 (L) 31.5 - 35.7 g/dL    RDW 15.5 (H) 12.3 - 15.4 %    RDW-SD 45.8 37.0 - 54.0 fl    MPV 9.6 6.0 - 12.0 fL    Platelets 168 140 - 450 10*3/mm3    Neutrophil % 72.8 42.7 - 76.0 %    Lymphocyte % 10.6 (L) 19.6 - 45.3 %    Monocyte % 14.3 (H) 5.0 - 12.0 %    Eosinophil % 1.7 0.3 - 6.2 %    Basophil % 0.2 0.0 - 1.5 %    Immature Grans % 0.4 0.0 - 0.5 %    Neutrophils, Absolute 3.97 1.70 - 7.00 10*3/mm3    Lymphocytes, Absolute 0.58 (L) 0.70 - 3.10 10*3/mm3    Monocytes, Absolute 0.78 0.10 - 0.90 10*3/mm3    Eosinophils, Absolute 0.09 0.00 - 0.40 10*3/mm3    Basophils, Absolute  0.01 0.00 - 0.20 10*3/mm3    Immature Grans, Absolute 0.02 0.00 - 0.05 10*3/mm3    nRBC 0.0 0.0 - 0.2 /100 WBC   Duplex Venous Lower Extremity - RIGHT    Collection Time: 01/26/24  5:35 PM   Result Value Ref Range    Right Common Femoral Spont Y     Right Common Femoral Competent Y     Right Common Femoral Phasic Y     Right Common Femoral Compress C     Right Common Femoral Augment Y     Right Saphenofemoral Junction Compress C     Right Profunda Femoral Compress C     Right Proximal Femoral Compress C     Right Mid Femoral Spont Y     Right Mid Femoral Competent Y     Right Mid Femoral Phasic Y     Right Mid Femoral Compress C     Right Mid Femoral Augment Y     Right Distal Femoral Compress C     Right Popliteal Spont Y     Right Popliteal Competent Y     Right Popliteal Phasic Y     Right Popliteal Compress C     Right Popliteal Augment Y     Right Posterior Tibial Compress C     Right Peroneal Compress C     Right Gastronemius Compress C     Right Greater Saph AK Compress C     Right Greater Saph BK Compress C     Right Lesser Saph Compress C     Left Common Femoral Spont Y     Left Common Femoral Competent Y     Left Common Femoral Phasic Y     Left Common Femoral Compress C     Left Common Femoral Augment Y     BH CV VAS PRELIMINARY FINDINGS SCRIPTING 1.0        Ordered the above labs and reviewed the results.        RADIOLOGY  CT Abdomen Pelvis Without Contrast    Result Date: 1/26/2024  CT ABDOMEN AND PELVIS WITHOUT IV CONTRAST  HISTORY: Abdominal pain with nausea and vomiting  TECHNIQUE:  CT includes axial imaging from the lung bases to the trochanters without intravenous contrast and without use of oral contrast. Data reconstructed in coronal and sagittal planes. Radiation dose reduction techniques were utilized, including automated exposure control and exposure modulation based on body size.  COMPARISON: CT abdomen and pelvis 02/14/2023  FINDINGS: Small left pleural effusion layers dependently. The heart  size is enlarged. There has been previous median sternotomy which is not yet united.  There is moderate intra-abdominal ascites. Perihepatic and perisplenic fluid are present. Generalized mesenteric edema and stranding are present. The presence of ascites and mesenteric stranding and edema limits evaluation for focal inflammatory process. Stomach is mostly decompressed.  There appears to be partly loculated fluid extending along the left lateral margin of the stomach measuring up to 19 mm in thickness and extending along the greater curvature. There is also soft tissue thickening surrounding the pancreatic tail with potential small collections or developing collections. Edema and soft tissue thickening surrounds the left adrenal gland which also appears thickened. The right adrenal gland appears normal.  There is no bowel dilatation or evidence for bowel obstruction. There is no evidence for free intraperitoneal air.  There is soft tissue thickening in the retroperitoneum particularly extending between the abdominal aorta and IVC suspected to represent antolin enlargement and this is most likely reactive though evaluation is limited without IV contrast.      : 1. Abnormal thickening of the distal pancreatic body and tail consistent with pancreatitis. There appear to be pancreatic/peripancreatic collections that extend superiorly adjacent to the stomach and measuring up to 2 cm in thickness. 2. Mild diffuse ascites and mesenteric stranding. No evidence for bowel obstruction. 3. Small left pleural effusion layers dependently. 4. Cardiomegaly. Previous median sternotomy. 5. Retroperitoneal soft tissue thickening particularly in the aortocaval region most likely related to reactive adenopathy though recommend follow-up with contrast CT when possible.  Radiation dose reduction techniques were utilized, including automated exposure control and exposure modulation based on body size.       Duplex Venous Lower Extremity -  RIGHT    Result Date: 1/26/2024    Normal right lower extremity venous duplex scan.     XR Foot 3+ View Right    Result Date: 1/26/2024  XR FOOT 3+ VW RIGHT-1/26/2024  HISTORY: Right foot pain and swelling.  There is moderate soft tissue swelling of the right foot. No fractures or other acute bony abnormalities are seen. No abnormal air collections are seen.      1. Soft tissue swelling of the right foot. 2. No other significant findings are noted.   This report was finalized on 1/26/2024 4:52 PM by Dr. lCyde Rayo M.D on Workstation: [id number]       Ordered the above noted radiological studies.  X-ray of right foot independently interpreted by me and shows no evidence of fracture          PROCEDURES  Procedures            MEDICATIONS GIVEN IN ER  Medications   sodium chloride 0.9 % flush 10 mL (has no administration in time range)   HYDROmorphone (DILAUDID) injection 0.5 mg (has no administration in time range)   labetalol (NORMODYNE,TRANDATE) injection 10 mg (has no administration in time range)   HYDROmorphone (DILAUDID) injection 0.5 mg (0.5 mg Intravenous Given 1/26/24 1654)   ondansetron (ZOFRAN) injection 4 mg (4 mg Intravenous Given 1/26/24 1654)                   MEDICAL DECISION MAKING, PROGRESS, and CONSULTS    All labs have been independently reviewed by me.  All radiology studies have been reviewed by me and I have also reviewed the radiology report.   EKG's independently viewed and interpreted by me.  Discussion below represents my analysis of pertinent findings related to patient's condition, differential diagnosis, treatment plan and final disposition.      Additional sources:  - Discussed/ obtained information from independent historians: None    - External (non-ED) record review: Epic reviewed and patient was admitted beginning of this month for acute on chronic anemia    - Chronic or social conditions impacting care: None    - Shared decision making: None      Orders placed during this  visit:  Orders Placed This Encounter   Procedures    XR Foot 3+ View Right    CT Abdomen Pelvis Without Contrast    Comprehensive Metabolic Panel    Lipase    hCG, Serum, Qualitative    CBC Auto Differential    [facility] (on-call MD unless specified) Details    Insert Peripheral IV    Initiate Observation Status    CBC & Differential         Additional orders considered but not ordered:  None        Differential diagnosis includes but is not limited to:    Alcoholic pancreatitis versus gallstone pancreatitis      Independent interpretation of labs, radiology studies, and discussions with consultants:  ED Course as of 01/26/24 1936 Fri Jan 26, 2024 1929 19:29 EST  Patient presents for abdominal pain and appears to have pancreatitis of the tail of her pancreas.  Lipase elevated.  CT scan shows pancreatitis.  Has been given pain medication and nausea medicine.  Will be given small amount of labetalol as well.  Patient also appears to be anemic again.  Discussed with Dr. Castillo who will admit. [SL]      ED Course User Index  [SL] Johnathan Hughes MD                 DIAGNOSIS  Final diagnoses:   Acute pancreatitis, unspecified complication status, unspecified pancreatitis type   ESRD (end stage renal disease)   Anemia due to chronic kidney disease, unspecified CKD stage   Swelling of right foot         DISPOSITION  Admit            Latest Documented Vital Signs:  As of 19:36 EST  BP- (!) 175/124 HR- 100 Temp- 97.8 °F (36.6 °C) (Tympanic) O2 sat- 97%              --    Please note that portions of this were completed with a voice recognition program.       Note Disclaimer: At [facility], we believe that sharing information builds trust and better relationships. You are receiving this note because you are receiving care at [facility] or recently visited. It is possible you will see health information before a provider has talked with you about it. This kind of information can be easy to misunderstand. To help  you fully understand what it means for your health, we urge you to discuss this note with your provider.            Johnathan Hughes MD  01/26/24 1937

## 2024-01-27 ENCOUNTER — APPOINTMENT (OUTPATIENT)
Dept: ULTRASOUND IMAGING | Facility: HOSPITAL | Age: 34
End: 2024-01-27
Payer: MEDICARE

## 2024-01-27 PROBLEM — A41.9 SEPSIS: Status: ACTIVE | Noted: 2024-01-27

## 2024-01-27 LAB
ALBUMIN SERPL-MCNC: 2.3 G/DL (ref 3.5–5.2)
ANION GAP SERPL CALCULATED.3IONS-SCNC: 11 MMOL/L (ref 5–15)
BASOPHILS # BLD AUTO: 0.01 10*3/MM3 (ref 0–0.2)
BASOPHILS NFR BLD AUTO: 0.2 % (ref 0–1.5)
BUN SERPL-MCNC: 19 MG/DL (ref 6–20)
BUN/CREAT SERPL: 2.9 (ref 7–25)
CALCIUM SPEC-SCNC: 8.5 MG/DL (ref 8.6–10.5)
CHLORIDE SERPL-SCNC: 100 MMOL/L (ref 98–107)
CO2 SERPL-SCNC: 25 MMOL/L (ref 22–29)
CREAT SERPL-MCNC: 6.45 MG/DL (ref 0.57–1)
D-LACTATE SERPL-SCNC: 0.6 MMOL/L (ref 0.5–2)
DEPRECATED RDW RBC AUTO: 44.8 FL (ref 37–54)
EGFRCR SERPLBLD CKD-EPI 2021: 8.3 ML/MIN/1.73
EOSINOPHIL # BLD AUTO: 0.03 10*3/MM3 (ref 0–0.4)
EOSINOPHIL NFR BLD AUTO: 0.5 % (ref 0.3–6.2)
ERYTHROCYTE [DISTWIDTH] IN BLOOD BY AUTOMATED COUNT: 15.2 % (ref 12.3–15.4)
GLUCOSE SERPL-MCNC: 89 MG/DL (ref 65–99)
HCT VFR BLD AUTO: 24.2 % (ref 34–46.6)
HGB BLD-MCNC: 7.5 G/DL (ref 12–15.9)
IMM GRANULOCYTES # BLD AUTO: 0.02 10*3/MM3 (ref 0–0.05)
IMM GRANULOCYTES NFR BLD AUTO: 0.3 % (ref 0–0.5)
LIPASE SERPL-CCNC: 189 U/L (ref 13–60)
LYMPHOCYTES # BLD AUTO: 0.51 10*3/MM3 (ref 0.7–3.1)
LYMPHOCYTES NFR BLD AUTO: 8.2 % (ref 19.6–45.3)
MCH RBC QN AUTO: 25.7 PG (ref 26.6–33)
MCHC RBC AUTO-ENTMCNC: 31 G/DL (ref 31.5–35.7)
MCV RBC AUTO: 82.9 FL (ref 79–97)
MONOCYTES # BLD AUTO: 0.93 10*3/MM3 (ref 0.1–0.9)
MONOCYTES NFR BLD AUTO: 15 % (ref 5–12)
NEUTROPHILS NFR BLD AUTO: 4.71 10*3/MM3 (ref 1.7–7)
NEUTROPHILS NFR BLD AUTO: 75.8 % (ref 42.7–76)
NRBC BLD AUTO-RTO: 0 /100 WBC (ref 0–0.2)
PHOSPHATE SERPL-MCNC: 4.1 MG/DL (ref 2.5–4.5)
PLATELET # BLD AUTO: 173 10*3/MM3 (ref 140–450)
PMV BLD AUTO: 10.2 FL (ref 6–12)
POTASSIUM SERPL-SCNC: 4.1 MMOL/L (ref 3.5–5.2)
RBC # BLD AUTO: 2.92 10*6/MM3 (ref 3.77–5.28)
SODIUM SERPL-SCNC: 136 MMOL/L (ref 136–145)
WBC NRBC COR # BLD AUTO: 6.21 10*3/MM3 (ref 3.4–10.8)

## 2024-01-27 PROCEDURE — 25010000002 VANCOMYCIN PER 500 MG: Performed by: HOSPITALIST

## 2024-01-27 PROCEDURE — 0W9G3ZZ DRAINAGE OF PERITONEAL CAVITY, PERCUTANEOUS APPROACH: ICD-10-PCS | Performed by: RADIOLOGY

## 2024-01-27 PROCEDURE — 83690 ASSAY OF LIPASE: CPT | Performed by: HOSPITALIST

## 2024-01-27 PROCEDURE — 76705 ECHO EXAM OF ABDOMEN: CPT

## 2024-01-27 PROCEDURE — 99223 1ST HOSP IP/OBS HIGH 75: CPT | Performed by: INTERNAL MEDICINE

## 2024-01-27 PROCEDURE — 36415 COLL VENOUS BLD VENIPUNCTURE: CPT | Performed by: HOSPITALIST

## 2024-01-27 PROCEDURE — 87040 BLOOD CULTURE FOR BACTERIA: CPT | Performed by: HOSPITALIST

## 2024-01-27 PROCEDURE — 83605 ASSAY OF LACTIC ACID: CPT | Performed by: HOSPITALIST

## 2024-01-27 PROCEDURE — 99222 1ST HOSP IP/OBS MODERATE 55: CPT | Performed by: INTERNAL MEDICINE

## 2024-01-27 PROCEDURE — 76942 ECHO GUIDE FOR BIOPSY: CPT

## 2024-01-27 PROCEDURE — 85025 COMPLETE CBC W/AUTO DIFF WBC: CPT | Performed by: NURSE PRACTITIONER

## 2024-01-27 PROCEDURE — 80069 RENAL FUNCTION PANEL: CPT | Performed by: NURSE PRACTITIONER

## 2024-01-27 PROCEDURE — 25010000002 PIPERACILLIN SOD-TAZOBACTAM PER 1 G: Performed by: HOSPITALIST

## 2024-01-27 PROCEDURE — 25010000002 HYDROMORPHONE 1 MG/ML SOLUTION: Performed by: INTERNAL MEDICINE

## 2024-01-27 PROCEDURE — 5A1D70Z PERFORMANCE OF URINARY FILTRATION, INTERMITTENT, LESS THAN 6 HOURS PER DAY: ICD-10-PCS | Performed by: INTERNAL MEDICINE

## 2024-01-27 RX ORDER — AMLODIPINE BESYLATE 10 MG/1
10 TABLET ORAL
Status: DISCONTINUED | OUTPATIENT
Start: 2024-01-27 | End: 2024-01-27

## 2024-01-27 RX ORDER — ESCITALOPRAM OXALATE 10 MG/1
10 TABLET ORAL DAILY
Status: DISCONTINUED | OUTPATIENT
Start: 2024-01-27 | End: 2024-01-29 | Stop reason: HOSPADM

## 2024-01-27 RX ORDER — FOLIC ACID 1 MG/1
1 TABLET ORAL DAILY
Status: DISCONTINUED | OUTPATIENT
Start: 2024-01-27 | End: 2024-01-29 | Stop reason: HOSPADM

## 2024-01-27 RX ORDER — LISINOPRIL 10 MG/1
10 TABLET ORAL
Status: DISCONTINUED | OUTPATIENT
Start: 2024-01-27 | End: 2024-01-29 | Stop reason: HOSPADM

## 2024-01-27 RX ORDER — HYDROXYCHLOROQUINE SULFATE 200 MG/1
200 TABLET, FILM COATED ORAL DAILY
Status: DISCONTINUED | OUTPATIENT
Start: 2024-01-27 | End: 2024-01-29 | Stop reason: HOSPADM

## 2024-01-27 RX ORDER — LACOSAMIDE 100 MG/1
100 TABLET ORAL EVERY 12 HOURS SCHEDULED
Status: DISCONTINUED | OUTPATIENT
Start: 2024-01-27 | End: 2024-01-29 | Stop reason: HOSPADM

## 2024-01-27 RX ORDER — OXYCODONE HYDROCHLORIDE 5 MG/1
5 TABLET ORAL EVERY 6 HOURS PRN
Status: DISCONTINUED | OUTPATIENT
Start: 2024-01-27 | End: 2024-01-29 | Stop reason: HOSPADM

## 2024-01-27 RX ORDER — CARVEDILOL 25 MG/1
25 TABLET ORAL EVERY 12 HOURS SCHEDULED
Status: DISCONTINUED | OUTPATIENT
Start: 2024-01-27 | End: 2024-01-29 | Stop reason: HOSPADM

## 2024-01-27 RX ADMIN — HYDROXYCHLOROQUINE SULFATE 200 MG: 200 TABLET ORAL at 11:26

## 2024-01-27 RX ADMIN — CARVEDILOL 25 MG: 25 TABLET, FILM COATED ORAL at 11:27

## 2024-01-27 RX ADMIN — FOLIC ACID 1 MG: 1 TABLET ORAL at 11:27

## 2024-01-27 RX ADMIN — LACOSAMIDE 100 MG: 100 TABLET, FILM COATED ORAL at 11:32

## 2024-01-27 RX ADMIN — OXYCODONE HYDROCHLORIDE 5 MG: 5 TABLET ORAL at 23:56

## 2024-01-27 RX ADMIN — OXYCODONE HYDROCHLORIDE 5 MG: 5 TABLET ORAL at 01:38

## 2024-01-27 RX ADMIN — OXYCODONE HYDROCHLORIDE 5 MG: 5 TABLET ORAL at 11:01

## 2024-01-27 RX ADMIN — CARVEDILOL 25 MG: 25 TABLET, FILM COATED ORAL at 01:38

## 2024-01-27 RX ADMIN — ESCITALOPRAM OXALATE 10 MG: 10 TABLET, FILM COATED ORAL at 11:27

## 2024-01-27 RX ADMIN — LACOSAMIDE 100 MG: 100 TABLET, FILM COATED ORAL at 21:45

## 2024-01-27 RX ADMIN — PIPERACILLIN SODIUM AND TAZOBACTAM SODIUM 3.38 G: 3; .375 INJECTION, SOLUTION INTRAVENOUS at 00:32

## 2024-01-27 RX ADMIN — LACOSAMIDE 100 MG: 100 TABLET, FILM COATED ORAL at 01:38

## 2024-01-27 RX ADMIN — HYDROMORPHONE HYDROCHLORIDE 1 MG: 1 INJECTION, SOLUTION INTRAMUSCULAR; INTRAVENOUS; SUBCUTANEOUS at 14:41

## 2024-01-27 RX ADMIN — VANCOMYCIN HYDROCHLORIDE 1000 MG: 1 INJECTION, SOLUTION INTRAVENOUS at 01:36

## 2024-01-27 RX ADMIN — OXYCODONE HYDROCHLORIDE 5 MG: 5 TABLET ORAL at 17:49

## 2024-01-27 RX ADMIN — LISINOPRIL 10 MG: 10 TABLET ORAL at 11:26

## 2024-01-27 RX ADMIN — PIPERACILLIN SODIUM AND TAZOBACTAM SODIUM 3.38 G: 3; .375 INJECTION, SOLUTION INTRAVENOUS at 04:44

## 2024-01-27 RX ADMIN — PIPERACILLIN SODIUM AND TAZOBACTAM SODIUM 3.38 G: 3; .375 INJECTION, SOLUTION INTRAVENOUS at 17:40

## 2024-01-27 RX ADMIN — CARVEDILOL 25 MG: 25 TABLET, FILM COATED ORAL at 21:45

## 2024-01-27 NOTE — PROGRESS NOTES
[facility] Clinical Pharmacy Services: Vancomycin Pharmacokinetic Initial Consult Note    Dee Herrera is a 33 y.o. female who is on day 1 of pharmacy to dose vancomycin.    Indication: Sepsis  Consulting Provider: Stephen Castillo MD   Planned Duration of Therapy: 5d  Loading Dose Ordered or Given: 1000 mg  MRSA PCR performed: none  Culture/Source: blood  Target: Dose by Levels  Pertinent Vanc Dosing History:   Other Antimicrobials: zosyn    Vitals/Labs  Ht:  ; Wt:    Temp Readings from Last 1 Encounters:   01/26/24 100.4 °F (38 °C) (Oral)    Estimated Creatinine Clearance: 9.6 mL/min (A) (by C-G formula based on SCr of 6.38 mg/dL (H)).  ESRD on HD      Results from last 7 days   Lab Units 01/26/24  1654   CREATININE mg/dL 6.38*   WBC 10*3/mm3 5.45     Assessment/Plan:    Vancomycin Dose:   1000 mg LD plus intermittent dosing after HD. Dialysis schedule per Nephrology  Vanc Random will be order once we have a confirmed dialysis schedule     Pharmacy will follow patient's kidney function and will adjust doses and obtain levels as necessary. Thank you for involving pharmacy in this patient's care. Please contact pharmacy with any questions or concerns.                           Brad Storm [facility]  Clinical Pharmacist

## 2024-01-27 NOTE — NURSING NOTE
DR. LAKE NOTIFIED PATIENT REFUSING DIALYSIS TODAY, PER PRIMARY RN. NEW ORDER TO RESCHEDULE HD FOR 1/28/24.

## 2024-01-27 NOTE — NURSING NOTE
"Transport here to take pt down to Dialysis, pt refusing to go to dialysis. Pt educated on importance of following the treatment plan. Pt still refuses and states \" Im not going to dialysis, there ain't no fluid to take off.\"  Called and notified Dialysis RN of this and states she \"will notify Dr. Mendoza and see what he would like to do.\"  "

## 2024-01-27 NOTE — CONSULTS
Patient Care Team:  Margarita Woods APRN as PCP - General (Nurse Practitioner)  Winnie Sanchez MD as Referring Physician (Obstetrics and Gynecology)  Norberto Almaraz MD PhD as Consulting Physician (Hematology and Oncology)  Lupis Thomas MD as Consulting Physician (Nephrology)  Hair Mckeon MD as Consulting Physician (Nephrology)  Juana Taylor MD as Consulting Physician (Cardiology)    Chief complaint: ESRD    Subjective     History of Present Illness  34yo with h/o ESRD, SLE, recent aortic dissection and repair presented to ER with abdominal pain.  She had fevers to 102.  Her hgb had trended down to 7.2.  She had findings of pancreatitis on CT and admitted for evaluation.  She is feeling better today.      Review of Systems   Constitutional:  Positive for appetite change, fatigue and fever. Negative for chills.   Respiratory:  Negative for cough and shortness of breath.    Cardiovascular:  Positive for leg swelling. Negative for chest pain.   Gastrointestinal:  Positive for abdominal pain. Negative for abdominal distention, blood in stool, diarrhea, nausea and vomiting.        Past Medical History:   Diagnosis Date    Anasarca     PER CT SCAN    Dry skin     ESRD (end stage renal disease) on dialysis     TUES, THURS, SAT [address]    History of abdominal pain     History of anemia     History of transfusion     Hypertension     Iron deficiency anemia 09/27/2021    Lupus (systemic lupus erythematosus) 07/30/2022    Migraine     Other specified nutritional anemias     Pericardial effusion     Renal insufficiency     Seizures     STATES LAST WAS 1/2023    Shortness of breath     OCCASIONAL    Vitamin D deficiency 09/27/2021   ,   Past Surgical History:   Procedure Laterality Date    ASCENDING AORTIC ANEURYSM REPAIR W/ MECHANICAL AORTIC VALVE REPLACEMENT N/A 11/2/2023    Procedure: CHETNA STERNOTOMY, AORTIC ROOT REPLACEMENT WITH VALVE SPARING MISHEL PROCEDURE, REPLACEMENT OF ASCENDING  AORTA, RIGHT FEMORAL DIALYSIS CATHETER PLACEMENT AND PRP;  Surgeon: Chris Medina MD;  Location: [facility] CVOR;  Service: Cardiothoracic;  Laterality: N/A;    CARDIAC CATHETERIZATION N/A 06/14/2023    Procedure: Coronary angiography;  Surgeon: Juana Taylor MD;  Location: [facility] CATH INVASIVE LOCATION;  Service: Cardiovascular;  Laterality: N/A;    CARDIAC CATHETERIZATION N/A 06/14/2023    Procedure: Left heart cath;  Surgeon: Juana Taylor MD;  Location: [facility] CATH INVASIVE LOCATION;  Service: Cardiovascular;  Laterality: N/A;    CARDIAC CATHETERIZATION N/A 06/14/2023    Procedure: Right Heart Cath;  Surgeon: Juana Taylor MD;  Location: [facility] CATH INVASIVE LOCATION;  Service: Cardiovascular;  Laterality: N/A;    COLONOSCOPY N/A 7/20/2023    Procedure: COLONOSCOPY to cecum with biopsy;  Surgeon: Drew Kaminski MD;  Location: [facility] ENDOSCOPY;  Service: Gastroenterology;  Laterality: N/A;  PRE - diarrhea, constipation  POST - fair prep, normal    CORONARY ARTERY BYPASS GRAFT N/A 11/6/2023    Procedure: STERNAL EXPLORATION AND WASH OUT;  Surgeon: Jr Mitesh Quiroz MD;  Location: [facility] CV[facility];  Service: Cardiothoracic;  Laterality: N/A;    ENDOSCOPY N/A 7/20/2023    Procedure: ESOPHAGOGASTRODUODENOSCOPY with biopsy;  Surgeon: Drew Kaminski MD;  Location: [facility] ENDOSCOPY;  Service: Gastroenterology;  Laterality: N/A;  PRE - abn ct abd  POST - gastritis    INSERTION HEMODIALYSIS CATHETER N/A 07/26/2022    Procedure: RIGHT TUNNELED DIALYSIS CATHETER PLACEMENT;  Surgeon: Diandra Adhikari MD;  Location: [facility] MAIN OR;  Service: Vascular;  Laterality: N/A;    INSERTION HEMODIALYSIS CATHETER Left 11/29/2023    Procedure: TUNNELED DIALYSIS CATHETER PLACEMENT;  Surgeon: Jose Patel II, MD;  Location: [facility] MAIN OR;  Service: Vascular;  Laterality: Left;    INSERTION PERITONEAL DIALYSIS CATHETER N/A 04/03/2023    Procedure: INSERTION PERITONEAL DIALYSIS CATHETER LAPAROSCOPIC, omentumpexy;  Surgeon:  Jemal Loyola MD;  Location: [facility] MAIN OR;  Service: General;  Laterality: N/A;    REMOVAL PERITONEAL DIALYSIS CATHETER N/A 12/1/2023    Procedure: REMOVAL PERITONEAL DIALYSIS CATHETER;  Surgeon: Maryellen Ashton MD;  Location: [facility] MAIN OR;  Service: General;  Laterality: N/A;    TONSILLECTOMY     ,   Family History   Problem Relation Age of Onset    Autoimmune disease Mother     Anemia Mother     Diabetes Sister     Anemia Brother     Diabetes Maternal Grandmother     Hypertension Maternal Grandmother     Cancer Maternal Grandmother     Sickle cell anemia Cousin     Kevon Hyperthermia Neg Hx    ,   Social History     Socioeconomic History    Marital status: Single   Tobacco Use    Smoking status: Former     Types: Cigars     Passive exposure: Past    Smokeless tobacco: Never    Tobacco comments:     Patient smoked black & mild   Vaping Use    Vaping Use: Never used   Substance and Sexual Activity    Alcohol use: Yes     Comment: social    Drug use: Yes     Types: Marijuana     Comment: OCCASIONAL    Sexual activity: Not Currently     Partners: Male     Birth control/protection: None     E-cigarette/Vaping    E-cigarette/Vaping Use Never User     Passive Exposure No     Counseling Given No      E-cigarette/Vaping Substances    Nicotine No     THC No     CBD No     Flavoring No      E-cigarette/Vaping Devices    Disposable No     Pre-filled or Refillable Cartridge No     Refillable Tank No     Pre-filled Pod No          ,   Medications Prior to Admission   Medication Sig Dispense Refill Last Dose    amLODIPine (NORVASC) 10 MG tablet Take 1 tablet by mouth Daily.       aspirin 81 MG chewable tablet Chew 1 tablet Daily.       carvedilol (COREG) 25 MG tablet Take 1 tablet by mouth Every 12 (Twelve) Hours. 60 tablet 0     escitalopram (LEXAPRO) 10 MG tablet Take 1 tablet by mouth Daily.       folic acid (FOLVITE) 1 MG tablet Take 1 tablet by mouth Daily. 90 tablet 1     hydroxychloroquine (PLAQUENIL)  200 MG tablet Take 1 tablet by mouth Daily.       lacosamide (VIMPAT) 100 MG tablet tablet Take 1 tablet by mouth Every 12 (Twelve) Hours. 6 tablet 0     lisinopril (PRINIVIL,ZESTRIL) 10 MG tablet Take 1 tablet by mouth Daily.       mycophenolate (CELLCEPT) 500 MG tablet Take 0.5 tablets by mouth Every 12 (Twelve) Hours. 30 tablet 0     ondansetron (Zofran) 4 MG tablet Take 1 tablet by mouth Every 8 (Eight) Hours As Needed for Nausea or Vomiting. 30 tablet 1     oxyCODONE (ROXICODONE) 5 MG immediate release tablet Take 1 tablet by mouth Every 6 (Six) Hours As Needed for Severe Pain.       polyethylene glycol (MIRALAX) 17 GM/SCOOP powder Take 17 g by mouth As Needed.      , Scheduled Meds:  amLODIPine, 10 mg, Oral, Q24H  carvedilol, 25 mg, Oral, Q12H  escitalopram, 10 mg, Oral, Daily  folic acid, 1 mg, Oral, Daily  hydroxychloroquine, 200 mg, Oral, Daily  lacosamide, 100 mg, Oral, Q12H  lisinopril, 10 mg, Oral, Q24H  piperacillin-tazobactam, 3.375 g, Intravenous, Q12H  Vancomycin Pharmacy Intermittent/Pulse Dosing, , Does not apply, Daily   , Continuous Infusions:  Pharmacy to dose vancomycin,   Pharmacy to Dose Zosyn,    , PRN Meds:    acetaminophen    aluminum-magnesium hydroxide-simethicone    HYDROmorphone    labetalol    nitroglycerin    ondansetron ODT **OR** ondansetron    oxyCODONE    Pharmacy to dose vancomycin    Pharmacy to Dose Zosyn    [COMPLETED] Insert Peripheral IV **AND** sodium chloride    sodium chloride, and Allergies:  Minoxidil    Objective     Vital Signs  Temp:  [97.8 °F (36.6 °C)-102.4 °F (39.1 °C)] 99.3 °F (37.4 °C)  Heart Rate:  [] 87  Resp:  [20-22] 22  BP: (122-175)/() 122/93    No intake/output data recorded.  No intake/output data recorded.    Physical Exam  Constitutional:       Appearance: Normal appearance.   HENT:      Nose: Nose normal.      Mouth/Throat:      Mouth: Mucous membranes are moist.   Eyes:      General: No scleral icterus.     Pupils: Pupils are equal,  "round, and reactive to light.   Cardiovascular:      Rate and Rhythm: Normal rate and regular rhythm.      Pulses: Normal pulses.   Pulmonary:      Effort: Pulmonary effort is normal.      Breath sounds: Normal breath sounds.   Abdominal:      General: Abdomen is flat.      Tenderness: There is abdominal tenderness.   Skin:     General: Skin is warm and dry.   Neurological:      General: No focal deficit present.      Mental Status: She is alert.         Results Review:    I reviewed the patient's new clinical results.    WBC WBC   Date Value Ref Range Status   01/27/2024 6.21 3.40 - 10.80 10*3/mm3 Final   01/26/2024 5.45 3.40 - 10.80 10*3/mm3 Final      HGB Hemoglobin   Date Value Ref Range Status   01/27/2024 7.5 (L) 12.0 - 15.9 g/dL Final   01/26/2024 7.2 (L) 12.0 - 15.9 g/dL Final      HCT Hematocrit   Date Value Ref Range Status   01/27/2024 24.2 (L) 34.0 - 46.6 % Final   01/26/2024 23.0 (L) 34.0 - 46.6 % Final      Platlets No results found for: \"LABPLAT\"   MCV MCV   Date Value Ref Range Status   01/27/2024 82.9 79.0 - 97.0 fL Final   01/26/2024 82.1 79.0 - 97.0 fL Final          Sodium Sodium   Date Value Ref Range Status   01/27/2024 136 136 - 145 mmol/L Final   01/26/2024 135 (L) 136 - 145 mmol/L Final      Potassium Potassium   Date Value Ref Range Status   01/27/2024 4.1 3.5 - 5.2 mmol/L Final   01/26/2024 4.0 3.5 - 5.2 mmol/L Final     Comment:     Slight hemolysis detected by analyzer. Result may be falsely elevated.      Chloride Chloride   Date Value Ref Range Status   01/27/2024 100 98 - 107 mmol/L Final   01/26/2024 98 98 - 107 mmol/L Final      CO2 CO2   Date Value Ref Range Status   01/27/2024 25.0 22.0 - 29.0 mmol/L Final   01/26/2024 27.4 22.0 - 29.0 mmol/L Final      BUN BUN   Date Value Ref Range Status   01/27/2024 19 6 - 20 mg/dL Final   01/26/2024 17 6 - 20 mg/dL Final      Creatinine Creatinine   Date Value Ref Range Status   01/27/2024 6.45 (H) 0.57 - 1.00 mg/dL Final   01/26/2024 6.38 " "(H) 0.57 - 1.00 mg/dL Final      Calcium Calcium   Date Value Ref Range Status   01/27/2024 8.5 (L) 8.6 - 10.5 mg/dL Final   01/26/2024 8.8 8.6 - 10.5 mg/dL Final      PO4 No results found for: \"CAPO4\"   Albumin Albumin   Date Value Ref Range Status   01/27/2024 2.3 (L) 3.5 - 5.2 g/dL Final   01/26/2024 2.6 (L) 3.5 - 5.2 g/dL Final      Magnesium No results found for: \"MG\"   Uric Acid Uric Acid   Date Value Ref Range Status   01/26/2024 3.6 2.4 - 5.7 mg/dL Final            Assessment & Plan       Acute pancreatitis    Iron deficiency anemia    Systemic lupus erythematosus    Seizure disorder    Essential hypertension    Chronic diastolic CHF (congestive heart failure)    ESRD (end stage renal disease)    Sepsis      Assessment & Plan  ESRD-  due to lupus.  Has [facility] for access.  Continue TTS schedule for now.  Noted pleural effusion on CT, UF as bp will tolerate.  Abd pain-  possible pancreatitis and peritonitis.  On emperic IV abx now.  Had fevers, blood cx pending.  Catheter looks ok.  Anemia-  Hgb stable today, check iron stores.    H/o SLE-  had been on plaquenil and cellcept.  Cellcept on hold.  H/o aortic aneurysm-  s/p repair 11/23.  HTN- on amlodipine/coreg.  Will hold amlodipine and see if we can get more UF with effusion noted.    I discussed the patients findings and my recommendations with patient    Vinnie Mendoza MD  01/27/24  07:39 EST            "

## 2024-01-27 NOTE — H&P
Patient Name:  Dee Herrera  YOB: 1990  MRN:  [id number]  Admit Date:  1/26/2024  Patient Care Team:  Margarita Woods APRN as PCP - General (Nurse Practitioner)  Winnie Sanchez MD as Referring Physician (Obstetrics and Gynecology)  Norberto Almaraz MD PhD as Consulting Physician (Hematology and Oncology)  Lupis Thomas MD as Consulting Physician (Nephrology)  Hair Mckeon MD as Consulting Physician (Nephrology)  Juana Taylor MD as Consulting Physician (Cardiology)      Subjective   History Present Illness     Chief Complaint   Patient presents with    Nausea    Vomiting       33-year-old female with history of lupus nephritis, HTN, ESRD, now on HD, along with recent prolonged hospital stay for aortic dissection status postrepair with discharge in early December.  She was since readmitted in early January for symptomatic anemia, was transfused and discharged shortly after.  She presented today with complaints of abdominal pain.  She says has been going on off and on for quite a while.  It got worse over the last couple of days.  Does not really relate to food.  It has been present most of the day recently.  Today she started to also have some pain in her right foot and swelling.  She had no trauma to the foot.  This evening she has spiked a temperature to 102.4 and BP has been elevated.  She is complaining of severe pain, unresponsive to Dilaudid 0.5 mg.  Pain is mostly on the left flank.  She has not noticed any melena or dark stools but hemoglobin is back down from 8.8 on discharge to 7.2 today.  She recently had a CT done at [facility] concerning for peritonitis with loculated fluid collection and lymphadenopathy.  Tonight CT also shows some pancreatic and peripancreatic collections with abnormalities in the distal pancreatic body and tail consistent with pancreatitis.      Review of Systems   Constitutional:  Positive for chills and fatigue. Negative for fever.   HENT:   Negative for congestion and trouble swallowing.    Eyes:  Negative for visual disturbance.   Respiratory:  Negative for cough, chest tightness and shortness of breath.    Cardiovascular:  Positive for leg swelling (Right). Negative for chest pain and palpitations.   Gastrointestinal:  Positive for abdominal pain, nausea and vomiting. Negative for abdominal distention and diarrhea.   Genitourinary:         Minimal urine output at baseline   Musculoskeletal:  Negative for arthralgias.   Skin:  Negative for rash.   Neurological:  Negative for dizziness and headaches.   Psychiatric/Behavioral:  Negative for confusion.         Personal History     Past Medical History:   Diagnosis Date    Anasarca     PER CT SCAN    Dry skin     ESRD (end stage renal disease) on dialysis     TUES, THURS, SAT [address]    History of abdominal pain     History of anemia     History of transfusion     Hypertension     Iron deficiency anemia 09/27/2021    Lupus (systemic lupus erythematosus) 07/30/2022    Migraine     Other specified nutritional anemias     Pericardial effusion     Renal insufficiency     Seizures     STATES LAST WAS 1/2023    Shortness of breath     OCCASIONAL    Vitamin D deficiency 09/27/2021     Past Surgical History:   Procedure Laterality Date    ASCENDING AORTIC ANEURYSM REPAIR W/ MECHANICAL AORTIC VALVE REPLACEMENT N/A 11/2/2023    Procedure: CHETNA STERNOTOMY, AORTIC ROOT REPLACEMENT WITH VALVE SPARING MISHEL PROCEDURE, REPLACEMENT OF ASCENDING AORTA, RIGHT FEMORAL DIALYSIS CATHETER PLACEMENT AND PRP;  Surgeon: Chris Medina MD;  Location: [facility] CVOR;  Service: Cardiothoracic;  Laterality: N/A;    CARDIAC CATHETERIZATION N/A 06/14/2023    Procedure: Coronary angiography;  Surgeon: Juana Taylor MD;  Location: [facility] CATH INVASIVE LOCATION;  Service: Cardiovascular;  Laterality: N/A;    CARDIAC CATHETERIZATION N/A 06/14/2023    Procedure: Left heart cath;  Surgeon: Juana Taylor MD;  Location: [facility]  CATH INVASIVE LOCATION;  Service: Cardiovascular;  Laterality: N/A;    CARDIAC CATHETERIZATION N/A 06/14/2023    Procedure: Right Heart Cath;  Surgeon: Juana Taylor MD;  Location: [facility] CATH INVASIVE LOCATION;  Service: Cardiovascular;  Laterality: N/A;    COLONOSCOPY N/A 7/20/2023    Procedure: COLONOSCOPY to cecum with biopsy;  Surgeon: Drew Kaminski MD;  Location: [facility] ENDOSCOPY;  Service: Gastroenterology;  Laterality: N/A;  PRE - diarrhea, constipation  POST - fair prep, normal    CORONARY ARTERY BYPASS GRAFT N/A 11/6/2023    Procedure: STERNAL EXPLORATION AND WASH OUT;  Surgeon: Jr Mitesh Quiroz MD;  Location: [facility] CVOR;  Service: Cardiothoracic;  Laterality: N/A;    ENDOSCOPY N/A 7/20/2023    Procedure: ESOPHAGOGASTRODUODENOSCOPY with biopsy;  Surgeon: Drew Kaminski MD;  Location: [facility] ENDOSCOPY;  Service: Gastroenterology;  Laterality: N/A;  PRE - abn ct abd  POST - gastritis    INSERTION HEMODIALYSIS CATHETER N/A 07/26/2022    Procedure: RIGHT TUNNELED DIALYSIS CATHETER PLACEMENT;  Surgeon: Diandra Adhikari MD;  Location: [facility] MAIN OR;  Service: Vascular;  Laterality: N/A;    INSERTION HEMODIALYSIS CATHETER Left 11/29/2023    Procedure: TUNNELED DIALYSIS CATHETER PLACEMENT;  Surgeon: Jose Patel II, MD;  Location: [facility] MAIN OR;  Service: Vascular;  Laterality: Left;    INSERTION PERITONEAL DIALYSIS CATHETER N/A 04/03/2023    Procedure: INSERTION PERITONEAL DIALYSIS CATHETER LAPAROSCOPIC, omentumpexy;  Surgeon: Jemal Loyola MD;  Location: [facility] MAIN OR;  Service: General;  Laterality: N/A;    REMOVAL PERITONEAL DIALYSIS CATHETER N/A 12/1/2023    Procedure: REMOVAL PERITONEAL DIALYSIS CATHETER;  Surgeon: Maryellen Ashton MD;  Location: [facility] MAIN OR;  Service: General;  Laterality: N/A;    TONSILLECTOMY       Family History   Problem Relation Age of Onset    Autoimmune disease Mother     Anemia Mother     Diabetes Sister     Anemia Brother     Diabetes Maternal  Grandmother     Hypertension Maternal Grandmother     Cancer Maternal Grandmother     Sickle cell anemia Cousin     [place] Hyperthermia Neg Hx      Social History     Tobacco Use    Smoking status: Former     Types: Cigars     Passive exposure: Past    Smokeless tobacco: Never    Tobacco comments:     Patient smoked black & mild   Vaping Use    Vaping Use: Never used   Substance Use Topics    Alcohol use: Yes     Comment: social    Drug use: Yes     Types: Marijuana     Comment: OCCASIONAL     No current facility-administered medications on file prior to encounter.     Current Outpatient Medications on File Prior to Encounter   Medication Sig Dispense Refill    amLODIPine (NORVASC) 10 MG tablet Take 1 tablet by mouth Daily.      aspirin 81 MG chewable tablet Chew 1 tablet Daily.      carvedilol (COREG) 25 MG tablet Take 1 tablet by mouth Every 12 (Twelve) Hours. 60 tablet 0    escitalopram (LEXAPRO) 10 MG tablet Take 1 tablet by mouth Daily.      folic acid (FOLVITE) 1 MG tablet Take 1 tablet by mouth Daily. 90 tablet 1    hydroxychloroquine (PLAQUENIL) 200 MG tablet Take 1 tablet by mouth Daily.      lacosamide (VIMPAT) 100 MG tablet tablet Take 1 tablet by mouth Every 12 (Twelve) Hours. 6 tablet 0    lisinopril (PRINIVIL,ZESTRIL) 10 MG tablet Take 1 tablet by mouth Daily.      mycophenolate (CELLCEPT) 500 MG tablet Take 0.5 tablets by mouth Every 12 (Twelve) Hours. 30 tablet 0    ondansetron (Zofran) 4 MG tablet Take 1 tablet by mouth Every 8 (Eight) Hours As Needed for Nausea or Vomiting. 30 tablet 1    oxyCODONE (ROXICODONE) 5 MG immediate release tablet Take 1 tablet by mouth Every 6 (Six) Hours As Needed for Severe Pain.      polyethylene glycol (MIRALAX) 17 GM/SCOOP powder Take 17 g by mouth As Needed.       Allergies   Allergen Reactions    Minoxidil Hives and Other (See Comments)     Pericardial effusion .       Objective    Objective     Vital Signs  Temp:  [97.8 °F (36.6 °C)-102.4 °F (39.1 °C)] 99.3 °F  (37.4 °C)  Heart Rate:  [] 102  Resp:  [20-22] 22  BP: (139-175)/(103-127) 139/103  SpO2:  [96 %-100 %] 100 %  on   ;   Device (Oxygen Therapy): room air  There is no height or weight on file to calculate BMI.    Physical Exam  Vitals reviewed.   Constitutional:       General: She is not in acute distress.     Comments: Chronically ill-appearing   HENT:      Head: Normocephalic and atraumatic.   Eyes:      General: No scleral icterus.  Neck:      Vascular: No JVD.   Cardiovascular:      Rate and Rhythm: Normal rate and regular rhythm.      Heart sounds: Murmur heard.   Pulmonary:      Effort: Pulmonary effort is normal. No respiratory distress.   Abdominal:      General: There is no distension.      Palpations: Abdomen is soft.      Tenderness: There is abdominal tenderness (Diffuse.). There is guarding.   Musculoskeletal:         General: No tenderness.      Cervical back: Neck supple.      Right lower leg: Edema present.      Comments: RLE with 2+ edema to above the ankle.  Foot very warm to the touch and tender   Skin:     General: Skin is warm and dry.      Coloration: Skin is not jaundiced.      Findings: No rash.   Neurological:      Mental Status: She is alert and oriented to person, place, and time.   Psychiatric:         Mood and Affect: Mood normal.         Behavior: Behavior normal.         Results Review:  I reviewed the patient's new clinical results.  I reviewed the patient's new imaging results and agree with the interpretation.  I reviewed the patient's other test results and agree with the interpretation  I personally viewed and interpreted the patient's EKG/Telemetry data  Discussed with ED provider.    Lab Results (last 24 hours)       Procedure Component Value Units Date/Time    CBC & Differential [[id number]]  (Abnormal) Collected: 01/26/24 1654    Specimen: Blood from Arm, Right Updated: 01/26/24 1706    Narrative:      The following orders were created for panel order CBC &  Differential.  Procedure                               Abnormality         Status                     ---------                               -----------         ------                     CBC Auto Differential[[id number]]        Abnormal            Final result                 Please view results for these tests on the individual orders.    Comprehensive Metabolic Panel [[id number]]  (Abnormal) Collected: 01/26/24 1654    Specimen: Blood from Arm, Right Updated: 01/26/24 1732     Glucose 89 mg/dL      BUN 17 mg/dL      Creatinine 6.38 mg/dL      Sodium 135 mmol/L      Potassium 4.0 mmol/L      Comment: Slight hemolysis detected by analyzer. Result may be falsely elevated.        Chloride 98 mmol/L      CO2 27.4 mmol/L      Calcium 8.8 mg/dL      Total Protein 6.3 g/dL      Albumin 2.6 g/dL      ALT (SGPT) 7 U/L      AST (SGOT) 13 U/L      Alkaline Phosphatase 65 U/L      Total Bilirubin 0.4 mg/dL      Globulin 3.7 gm/dL      A/G Ratio 0.7 g/dL      BUN/Creatinine Ratio 2.7     Anion Gap 9.6 mmol/L      eGFR 8.4 mL/min/1.73      Comment: <15 Indicative of kidney failure       Narrative:      GFR Normal >60  Chronic Kidney Disease <60  Kidney Failure <15      Lipase [[id number]]  (Abnormal) Collected: 01/26/24 1654    Specimen: Blood from Arm, Right Updated: 01/26/24 1732     Lipase 295 U/L     hCG, Serum, Qualitative [[id number]]  (Normal) Collected: 01/26/24 1654    Specimen: Blood from Arm, Right Updated: 01/26/24 1731     HCG Qualitative Negative    CBC Auto Differential [[id number]]  (Abnormal) Collected: 01/26/24 1654    Specimen: Blood from Arm, Right Updated: 01/26/24 1706     WBC 5.45 10*3/mm3      RBC 2.80 10*6/mm3      Hemoglobin 7.2 g/dL      Hematocrit 23.0 %      MCV 82.1 fL      MCH 25.7 pg      MCHC 31.3 g/dL      RDW 15.5 %      RDW-SD 45.8 fl      MPV 9.6 fL      Platelets 168 10*3/mm3      Neutrophil % 72.8 %      Lymphocyte % 10.6 %      Monocyte % 14.3 %      Eosinophil % 1.7 %      Basophil % 0.2 %       Immature Grans % 0.4 %      Neutrophils, Absolute 3.97 10*3/mm3      Lymphocytes, Absolute 0.58 10*3/mm3      Monocytes, Absolute 0.78 10*3/mm3      Eosinophils, Absolute 0.09 10*3/mm3      Basophils, Absolute 0.01 10*3/mm3      Immature Grans, Absolute 0.02 10*3/mm3      nRBC 0.0 /100 WBC     C-reactive Protein [[id number]]  (Abnormal) Collected: 01/26/24 1654    Specimen: Blood from Arm, Right Updated: 01/26/24 2139     C-Reactive Protein 10.55 mg/dL     Sedimentation Rate [[id number]]  (Abnormal) Collected: 01/26/24 1654    Specimen: Blood from Arm, Right Updated: 01/26/24 2147     Sed Rate 48 mm/hr     Uric Acid [[id number]]  (Normal) Collected: 01/26/24 1654    Specimen: Blood from Arm, Right Updated: 01/26/24 2139     Uric Acid 3.6 mg/dL     Respiratory Panel PCR w/COVID-19(SARS-CoV-2) [facility] In-House, NP Swab in UTM/VTM, 2 HR TAT - Swab, Nasopharynx [[id number]]  (Normal) Collected: 01/26/24 2151    Specimen: Swab from Nasopharynx Updated: 01/26/24 2331     ADENOVIRUS, PCR Not Detected     Coronavirus 229E Not Detected     Coronavirus HKU1 Not Detected     Coronavirus NL63 Not Detected     Coronavirus OC43 Not Detected     COVID19 Not Detected     Human Metapneumovirus Not Detected     Human Rhinovirus/Enterovirus Not Detected     Influenza A PCR Not Detected     Influenza B PCR Not Detected     Parainfluenza Virus 1 Not Detected     Parainfluenza Virus 2 Not Detected     Parainfluenza Virus 3 Not Detected     Parainfluenza Virus 4 Not Detected     RSV, PCR Not Detected     Bordetella pertussis pcr Not Detected     Bordetella parapertussis PCR Not Detected     Chlamydophila pneumoniae PCR Not Detected     Mycoplasma pneumo by PCR Not Detected    Narrative:      In the setting of a positive respiratory panel with a viral infection PLUS a negative procalcitonin without other underlying concern for bacterial infection, consider observing off antibiotics or discontinuation of antibiotics and  continue supportive care. If the respiratory panel is positive for atypical bacterial infection (Bordetella pertussis, Chlamydophila pneumoniae, or Mycoplasma pneumoniae), consider antibiotic de-escalation to target atypical bacterial infection.    Blood Culture - Blood, Arm, Left [[id number]] Collected: 01/27/24 0018    Specimen: Blood from Arm, Left Updated: 01/27/24 0028    Blood Culture - Blood, Hand, Left [[id number]] Collected: 01/27/24 0018    Specimen: Blood from Hand, Left Updated: 01/27/24 0027    Lactic Acid, Plasma [[id number]]  (Normal) Collected: 01/27/24 0018    Specimen: Blood Updated: 01/27/24 0046     Lactate 0.6 mmol/L     Renal Function Panel [[id number]]  (Abnormal) Collected: 01/27/24 0018    Specimen: Blood Updated: 01/27/24 0050     Glucose 89 mg/dL      BUN 19 mg/dL      Creatinine 6.45 mg/dL      Sodium 136 mmol/L      Potassium 4.1 mmol/L      Chloride 100 mmol/L      CO2 25.0 mmol/L      Calcium 8.5 mg/dL      Albumin 2.3 g/dL      Phosphorus 4.1 mg/dL      Anion Gap 11.0 mmol/L      BUN/Creatinine Ratio 2.9     eGFR 8.3 mL/min/1.73      Comment: <15 Indicative of kidney failure       Narrative:      GFR Normal >60  Chronic Kidney Disease <60  Kidney Failure <15      CBC & Differential [[id number]]  (Abnormal) Collected: 01/27/24 0018    Specimen: Blood Updated: 01/27/24 0031    Narrative:      The following orders were created for panel order CBC & Differential.  Procedure                               Abnormality         Status                     ---------                               -----------         ------                     CBC Auto Differential[[id number]]        Abnormal            Final result                 Please view results for these tests on the individual orders.    Lipase [[id number]]  (Abnormal) Collected: 01/27/24 0018    Specimen: Blood Updated: 01/27/24 0050     Lipase 189 U/L     CBC Auto Differential [[id number]]  (Abnormal) Collected: 01/27/24 0018    Specimen: Blood  Updated: 01/27/24 0031     WBC 6.21 10*3/mm3      RBC 2.92 10*6/mm3      Hemoglobin 7.5 g/dL      Hematocrit 24.2 %      MCV 82.9 fL      MCH 25.7 pg      MCHC 31.0 g/dL      RDW 15.2 %      RDW-SD 44.8 fl      MPV 10.2 fL      Platelets 173 10*3/mm3      Neutrophil % 75.8 %      Lymphocyte % 8.2 %      Monocyte % 15.0 %      Eosinophil % 0.5 %      Basophil % 0.2 %      Immature Grans % 0.3 %      Neutrophils, Absolute 4.71 10*3/mm3      Lymphocytes, Absolute 0.51 10*3/mm3      Monocytes, Absolute 0.93 10*3/mm3      Eosinophils, Absolute 0.03 10*3/mm3      Basophils, Absolute 0.01 10*3/mm3      Immature Grans, Absolute 0.02 10*3/mm3      nRBC 0.0 /100 WBC             Imaging Results (Last 24 Hours)       Procedure Component Value Units Date/Time    CT Abdomen Pelvis Without Contrast [[id number]] Collected: 01/26/24 1904     Updated: 01/26/24 2059    Narrative:      CT ABDOMEN AND PELVIS WITHOUT IV CONTRAST     HISTORY: Abdominal pain with nausea and vomiting     TECHNIQUE:  CT includes axial imaging from the lung bases to the  trochanters without intravenous contrast and without use of oral  contrast. Data reconstructed in coronal and sagittal planes. Radiation  dose reduction techniques were utilized, including automated exposure  control and exposure modulation based on body size.     COMPARISON: CT abdomen and pelvis 02/14/2023     FINDINGS: Small left pleural effusion layers dependently. The heart size  is enlarged. There has been previous median sternotomy which is not yet  united.     There is moderate intra-abdominal ascites. Perihepatic and perisplenic  fluid are present. Generalized mesenteric edema and stranding are  present. The presence of ascites and mesenteric stranding and edema  limits evaluation for focal inflammatory process. Stomach is mostly  decompressed.     There appears to be partly loculated fluid extending along the left  lateral margin of the stomach measuring up to 19 mm in thickness  and  extending along the greater curvature. There is also soft tissue  thickening surrounding the pancreatic tail with potential small  collections or developing collections. Edema and soft tissue thickening  surrounds the left adrenal gland which also appears thickened. The right  adrenal gland appears normal.     There is no bowel dilatation or evidence for bowel obstruction. There is  no evidence for free intraperitoneal air.     There is soft tissue thickening in the retroperitoneum particularly  extending between the abdominal aorta and IVC suspected to represent  antolin enlargement and this is most likely reactive though evaluation is  limited without IV contrast.       Impression:      :  1. Abnormal thickening of the distal pancreatic body and tail consistent  with pancreatitis. There appear to be pancreatic/peripancreatic  collections that extend superiorly adjacent to the stomach and measuring  up to 2 cm in thickness.  2. Mild diffuse ascites and mesenteric stranding. No evidence for bowel  obstruction.  3. Small left pleural effusion layers dependently.  4. Cardiomegaly. Previous median sternotomy.  5. Retroperitoneal soft tissue thickening particularly in the aortocaval  region most likely related to reactive adenopathy though recommend  follow-up with contrasted CT if possible.     Radiation dose reduction techniques were utilized, including automated  exposure control and exposure modulation based on body size.        This report was finalized on 1/26/2024 8:56 PM by Dr. Adolph Londono M.D on Workstation: [id number]       XR Foot 3+ View Right [[id number]] Collected: 01/26/24 1651     Updated: 01/26/24 1655    Narrative:      XR FOOT 3+ VW RIGHT-1/26/2024     HISTORY: Right foot pain and swelling.     There is moderate soft tissue swelling of the right foot. No fractures  or other acute bony abnormalities are seen. No abnormal air collections  are seen.       Impression:      1. Soft tissue swelling  of the right foot.  2. No other significant findings are noted.        This report was finalized on 1/26/2024 4:52 PM by Dr. Clyde Rayo M.D on Workstation: [id number]               Results for orders placed during the hospital encounter of 10/26/23    Adult Transthoracic Echo Limited W/ Cont if Necessary Per Protocol    Interpretation Summary    Left ventricular systolic function is normal. Calculated left ventricular EF = 64.4%    Left ventricular wall thickness is consistent with moderate concentric hypertrophy.    There is a prosthetic aortic valve present. The aortic valve peak and mean gradients are within defined limits. Mild transvalvular regurgitation is present in the prosthetic aortic valve.    There is a trivial pericardial effusion adjacent to the right atrium. There is no evidence of cardiac tamponade.      No orders to display        Assessment/Plan     Active Hospital Problems    Diagnosis  POA    **Acute pancreatitis [K85.90]  Yes    ESRD (end stage renal disease) [N18.6]  Yes    Chronic diastolic CHF (congestive heart failure) [I50.32]  Yes    Essential hypertension [I10]  Yes    Seizure disorder [G40.909]  Yes    Systemic lupus erythematosus [M32.9]  Yes    Iron deficiency anemia [D50.9]  Yes      Resolved Hospital Problems   No resolved problems to display.       Ms. Herrera is a 33 y.o. female with history of SLE on immunosuppression, ESRD, recent aortic dissection and repair, chronic anemia with recent transfusion admitted with abdominal pain.  Subsequently spiked fever as well    Patient is pretty sick, uncertain cause for her fever.  RVP ordered and has returned negative.  Ordering cultures and starting empiric antibiotics.  At this point I think have to cover for possible cellulitis or soft tissue infection in the right lower extremity as well as potential peritonitis based on CT results and exam discussed above.  She has pancreatitis with peripancreatic fluid and CT even from a few days  ago showing potential peritonitis.  Plan to get CT-guided paracentesis and start empiric antibiotics with blood cultures pending.  Ask GI to see in consultation.  Not sure cause for the pancreatitis but could be medication related.  She has her gallbladder but no sign of gallstones or any issue on CT.    Doppler of the right lower extremity negative but would consider additional imaging depending on results of above.  Added uric acid to lab which was normal, inflammatory markers not surprisingly are elevated.    Anemia is worse.  May need additional transfusion with HD tomorrow.  Consult nephrology.  Reviewed anemia studies from earlier this month and she does have some iron deficiency as well as folate deficiency, certainly contribution from CKD as well.  Also could consider upper endoscopy at some point as she does use NSAIDs frequently for pain including ibuprofen.    Added labetalol as needed for blood pressure.  I suspect this is pain related mostly.    Discussed extensively with RN and APRN.      VTE Prophylaxis - SCDs.  Code Status - Full code.       Stephen Castillo MD  [facility]  01/27/24  00:55 EST

## 2024-01-27 NOTE — ED NOTES
.Nursing report ED to floor  Dee Herrera  33 y.o.  female    HPI :   Chief Complaint   Patient presents with    Nausea    Vomiting       Admitting doctor:   Stephen Castillo MD    Admitting diagnosis:   The primary encounter diagnosis was Acute pancreatitis, unspecified complication status, unspecified pancreatitis type. Diagnoses of ESRD (end stage renal disease), Anemia due to chronic kidney disease, unspecified CKD stage, and Swelling of right foot were also pertinent to this visit.    Code status:   Current Code Status       Date Active Code Status Order ID Comments User Context       Prior            Allergies:   Minoxidil    Isolation:   No active isolations    Intake and Output  No intake or output data in the 24 hours ending 01/26/24 1954    Weight:   There were no vitals filed for this visit.    Most recent vitals:   Vitals:    01/26/24 1604 01/26/24 1628 01/26/24 1856   BP: (!) 165/118 (!) 168/127 (!) 175/124   BP Location: Right arm  Right arm   Patient Position: Lying  Lying   Pulse: 90 90 100   Resp: 20     Temp: 97.8 °F (36.6 °C)     TempSrc: Tympanic     SpO2: 99% 96% 97%       Active LDAs/IV Access:   Lines, Drains & Airways       Active LDAs       Name Placement date Placement time Site Days    Peripheral IV 01/26/24 1654 Right Antecubital 01/26/24  1654  Antecubital  less than 1    Hemodialysis Cath Double 11/29/23  1222  Internal Jugular  58                    Labs (abnormal labs have a star):   Labs Reviewed   COMPREHENSIVE METABOLIC PANEL - Abnormal; Notable for the following components:       Result Value    Creatinine 6.38 (*)     Sodium 135 (*)     Albumin 2.6 (*)     BUN/Creatinine Ratio 2.7 (*)     eGFR 8.4 (*)     All other components within normal limits    Narrative:     GFR Normal >60  Chronic Kidney Disease <60  Kidney Failure <15     LIPASE - Abnormal; Notable for the following components:    Lipase 295 (*)     All other components within normal limits   CBC WITH AUTO  DIFFERENTIAL - Abnormal; Notable for the following components:    RBC 2.80 (*)     Hemoglobin 7.2 (*)     Hematocrit 23.0 (*)     MCH 25.7 (*)     MCHC 31.3 (*)     RDW 15.5 (*)     Lymphocyte % 10.6 (*)     Monocyte % 14.3 (*)     Lymphocytes, Absolute 0.58 (*)     All other components within normal limits   HCG, SERUM, QUALITATIVE - Normal   CBC AND DIFFERENTIAL    Narrative:     The following orders were created for panel order CBC & Differential.  Procedure                               Abnormality         Status                     ---------                               -----------         ------                     CBC Auto Differential[[id number]]        Abnormal            Final result                 Please view results for these tests on the individual orders.       EKG:   No orders to display       Meds given in ED:   Medications   sodium chloride 0.9 % flush 10 mL (has no administration in time range)   HYDROmorphone (DILAUDID) injection 0.5 mg (has no administration in time range)   labetalol (NORMODYNE,TRANDATE) injection 10 mg (has no administration in time range)   HYDROmorphone (DILAUDID) injection 0.5 mg (0.5 mg Intravenous Given 1/26/24 1654)   ondansetron (ZOFRAN) injection 4 mg (4 mg Intravenous Given 1/26/24 1654)       Imaging results:  CT Abdomen Pelvis Without Contrast    Result Date: 1/26/2024  : 1. Abnormal thickening of the distal pancreatic body and tail consistent with pancreatitis. There appear to be pancreatic/peripancreatic collections that extend superiorly adjacent to the stomach and measuring up to 2 cm in thickness. 2. Mild diffuse ascites and mesenteric stranding. No evidence for bowel obstruction. 3. Small left pleural effusion layers dependently. 4. Cardiomegaly. Previous median sternotomy. 5. Retroperitoneal soft tissue thickening particularly in the aortocaval region most likely related to reactive adenopathy though recommend follow-up with contrast CT when possible.   Radiation dose reduction techniques were utilized, including automated exposure control and exposure modulation based on body size.       XR Foot 3+ View Right    Result Date: 1/26/2024  1. Soft tissue swelling of the right foot. 2. No other significant findings are noted.   This report was finalized on 1/26/2024 4:52 PM by Dr. Clyde Rayo M.D on Workstation: [facility]         Social issues:   Social History     Socioeconomic History    Marital status: Single   Tobacco Use    Smoking status: Former     Types: Cigars     Passive exposure: Past    Smokeless tobacco: Never    Tobacco comments:     Patient smoked black & mild   Vaping Use    Vaping Use: Never used   Substance and Sexual Activity    Alcohol use: Yes     Comment: social    Drug use: Yes     Types: Marijuana     Comment: OCCASIONAL    Sexual activity: Not Currently     Partners: Male     Birth control/protection: None       NIH Stroke Scale:       Jana Cartwright RN  01/26/24 19:54 EST

## 2024-01-27 NOTE — CONSULTS
"Referring Provider: Dr Hunt    Reason for Consultation: Fever/peritonitis/pancreatitis     History of present illness:  Dee Herrera is a 33 y.o. with ESRD on HD who I am asked to evaluate and give opinion for \"Fever/peritonitis/pancreatitis.\" History is obtained from the patient and review of the old medical records which I summarize/synthesize as follows: She presented to the emergency room on 1/26/2024 with body aches, abdominal pain, nausea, and vomiting that has been going on for about 2 days.  She also had right foot pain.  She does not recall any trauma to the right foot.  No history of gout.  She denies a prior history of pancreatitis.  She says she no longer drinks alcohol.    Initially in the emergency room she was afebrile but about 4 hours later had a fever of 102.4 F.  She has been hypertensive.  She is on room air.  Labs were notable for WBC 5, lipase 295, normal liver enzymes, RPP negative, and blood cultures are pending.  X-ray of the right foot showed soft tissue swelling.  CT showed acute pancreatitis and peripancreatic fluid collections.  Mild ascites and mesenteric stranding were noted.  An attempt at paracentesis was made but there was not enough fluid to aspirate.    She was started on empiric vancomycin and Zosyn.  Infectious diseases asked to evaluate.    Of note she was admitted from 10/26/2023 through 12/9/2023 during which time she underwent surgical repair of a dissecting ascending aortic aneurysm.  She says her incision is healed.    He does report that she is feeling better today    Past Medical History:   Diagnosis Date    Anasarca     PER CT SCAN    Dry skin     ESRD (end stage renal disease) on dialysis     TUES, THURS, SAT [address]    History of abdominal pain     History of anemia     History of transfusion     Hypertension     Iron deficiency anemia 09/27/2021    Lupus (systemic lupus erythematosus) 07/30/2022    Migraine     Other specified nutritional anemias  "    Pericardial effusion     Renal insufficiency     Seizures     STATES LAST WAS 1/2023    Shortness of breath     OCCASIONAL    Vitamin D deficiency 09/27/2021       Past Surgical History:   Procedure Laterality Date    ASCENDING AORTIC ANEURYSM REPAIR W/ MECHANICAL AORTIC VALVE REPLACEMENT N/A 11/2/2023    Procedure: CHETNA STERNOTOMY, AORTIC ROOT REPLACEMENT WITH VALVE SPARING MISHEL PROCEDURE, REPLACEMENT OF ASCENDING AORTA, RIGHT FEMORAL DIALYSIS CATHETER PLACEMENT AND PRP;  Surgeon: Chris Medina MD;  Location: [facility];  Service: Cardiothoracic;  Laterality: N/A;    CARDIAC CATHETERIZATION N/A 06/14/2023    Procedure: Coronary angiography;  Surgeon: Juana Taylor MD;  Location: [facility] CATH INVASIVE LOCATION;  Service: Cardiovascular;  Laterality: N/A;    CARDIAC CATHETERIZATION N/A 06/14/2023    Procedure: Left heart cath;  Surgeon: Juana Taylor MD;  Location: [facility] CATH INVASIVE LOCATION;  Service: Cardiovascular;  Laterality: N/A;    CARDIAC CATHETERIZATION N/A 06/14/2023    Procedure: Right Heart Cath;  Surgeon: Juana Taylor MD;  Location: [facility] CATH INVASIVE LOCATION;  Service: Cardiovascular;  Laterality: N/A;    COLONOSCOPY N/A 7/20/2023    Procedure: COLONOSCOPY to cecum with biopsy;  Surgeon: Drew Kaminski MD;  Location: [facility] ENDOSCOPY;  Service: Gastroenterology;  Laterality: N/A;  PRE - diarrhea, constipation  POST - fair prep, normal    CORONARY ARTERY BYPASS GRAFT N/A 11/6/2023    Procedure: STERNAL EXPLORATION AND WASH OUT;  Surgeon: Jr Mitesh Quiroz MD;  Location: [facility];  Service: Cardiothoracic;  Laterality: N/A;    ENDOSCOPY N/A 7/20/2023    Procedure: ESOPHAGOGASTRODUODENOSCOPY with biopsy;  Surgeon: Drew Kaminski MD;  Location: [facility] ENDOSCOPY;  Service: Gastroenterology;  Laterality: N/A;  PRE - abn ct abd  POST - gastritis    INSERTION HEMODIALYSIS CATHETER N/A 07/26/2022    Procedure: RIGHT TUNNELED DIALYSIS CATHETER PLACEMENT;  Surgeon: Diandra Adhikari  MD;  Location: [facility] MAIN OR;  Service: Vascular;  Laterality: N/A;    INSERTION HEMODIALYSIS CATHETER Left 11/29/2023    Procedure: TUNNELED DIALYSIS CATHETER PLACEMENT;  Surgeon: Jose Patel II, MD;  Location: [facility] MAIN OR;  Service: Vascular;  Laterality: Left;    INSERTION PERITONEAL DIALYSIS CATHETER N/A 04/03/2023    Procedure: INSERTION PERITONEAL DIALYSIS CATHETER LAPAROSCOPIC, omentumpexy;  Surgeon: Jemal Loyola MD;  Location: [facility] MAIN OR;  Service: General;  Laterality: N/A;    REMOVAL PERITONEAL DIALYSIS CATHETER N/A 12/1/2023    Procedure: REMOVAL PERITONEAL DIALYSIS CATHETER;  Surgeon: Maryellen Ashton MD;  Location: [facility] MAIN OR;  Service: General;  Laterality: N/A;    TONSILLECTOMY       Antibiotic allergies and intolerances:  None    Medications:    Current Facility-Administered Medications:     acetaminophen (TYLENOL) tablet 650 mg, 650 mg, Oral, Q4H PRN, Dariusz Lang APRN, 650 mg at 01/26/24 2148    aluminum-magnesium hydroxide-simethicone (MAALOX MAX) 400-400-40 MG/5ML suspension 15 mL, 15 mL, Oral, Q6H PRN, Dariusz Lang, APRN    carvedilol (COREG) tablet 25 mg, 25 mg, Oral, Q12H, Stephen Castillo MD, 25 mg at 01/27/24 1127    escitalopram (LEXAPRO) tablet 10 mg, 10 mg, Oral, Daily, Stephen Castillo MD, 10 mg at 01/27/24 1127    folic acid (FOLVITE) tablet 1 mg, 1 mg, Oral, Daily, Stephen Castillo MD, 1 mg at 01/27/24 1127    HYDROmorphone (DILAUDID) injection 1 mg, 1 mg, Intravenous, Q2H PRN, Lakisha Hunt MD    hydroxychloroquine (PLAQUENIL) tablet 200 mg, 200 mg, Oral, Daily, Stephen Castillo MD, 200 mg at 01/27/24 1126    labetalol (NORMODYNE,TRANDATE) injection 20 mg, 20 mg, Intravenous, Q2H PRN, Stephen Castillo MD    lacosamide (VIMPAT) tablet 100 mg, 100 mg, Oral, Q12H, Stephen Castillo MD, 100 mg at 01/27/24 1132    lisinopril (PRINIVIL,ZESTRIL) tablet 10 mg, 10 mg, Oral, Q24H,  Stephen Castillo MD, 10 mg at 01/27/24 1126    nitroglycerin (NITROSTAT) SL tablet 0.4 mg, 0.4 mg, Sublingual, Q5 Min PRN, Dariusz Lang APRN    ondansetron ODT (ZOFRAN-ODT) disintegrating tablet 4 mg, 4 mg, Oral, Q6H PRN **OR** ondansetron (ZOFRAN) injection 4 mg, 4 mg, Intravenous, Q6H PRN, Dariusz Lang APRN    oxyCODONE (ROXICODONE) immediate release tablet 5 mg, 5 mg, Oral, Q6H PRN, Stephen Castillo MD, 5 mg at 01/27/24 1101    Pharmacy to dose vancomycin, , Does not apply, Continuous PRN, Stephen Castillo MD    piperacillin-tazobactam (ZOSYN) 3.375 g in iso-osmotic dextrose 50 ml (premix), 3.375 g, Intravenous, Q12H, Stephen Castillo MD, 3.375 g at 01/27/24 0444    [COMPLETED] Insert Peripheral IV, , , Once **AND** sodium chloride 0.9 % flush 10 mL, 10 mL, Intravenous, PRN, Johnatahn Hughes MD    sodium chloride 0.9 % flush 10 mL, 10 mL, Intravenous, PRN, Dariusz Lang APRN    Vancomycin Pharmacy Intermittent/Pulse Dosing, , Does not apply, Daily, Stephen Castillo MD      Objective   Vital Signs   Temp:  [97.8 °F (36.6 °C)-102.4 °F (39.1 °C)] 98.8 °F (37.1 °C)  Heart Rate:  [] 95  Resp:  [16-22] 20  BP: ()/() 133/89    Physical Exam:   General: awake but trying to nap, NAD  Eyes: no scleral icterus  ENT: no thrush  Cardiovascular: NR  Respiratory: normal work of breathing on RA  GI: Abdomen is soft, mildly tender, + bowel sounds in all four quadrants  :  no Stark catheter  Skin: No rashes  Neurological: Alert and oriented x 3  Psychiatric: Normal mood and affect   Vasc: L chest HD catheter w/o erythema    Labs:     Lab Results   Component Value Date    WBC 6.21 01/27/2024    HGB 7.5 (L) 01/27/2024    HCT 24.2 (L) 01/27/2024    MCV 82.9 01/27/2024     01/27/2024       Lab Results   Component Value Date    GLUCOSE 89 01/27/2024    BUN 19 01/27/2024    CREATININE 6.45 (H) 01/27/2024    EGFRIFAFRI 8 (L) 03/20/2023     BCR 2.9 (L) 01/27/2024    CO2 25.0 01/27/2024    CALCIUM 8.5 (L) 01/27/2024    PROTENTOTREF 5.4 (L) 12/09/2022    ALBUMIN 2.3 (L) 01/27/2024    LABIL2 1.3 03/20/2023    AST 13 01/26/2024    ALT 7 01/26/2024     Lab Results   Component Value Date    LIPASE 189 (H) 01/27/2024     Lab Results   Component Value Date    CRP 10.55 (H) 01/26/2024     Lab Results   Component Value Date    URICACID 3.6 01/26/2024     hCG qualitative: Negative    Microbiology:  1/26/2024 RPP: Negative  1/27/2024  BCx: Pending    Radiology:  1/27 paracentesis attempted but not enough fluid    1/26 CT abdomen/pelvis: Acute pancreatitis and peripancreatic fluid collections measuring up to 2 cm in thickness; mild ascites and mesenteric stranding; no evidence of bowel obstruction; small left pleural effusion; cardiomegaly    1/26 right lower extremity Doppler: Negative for DVT    1/26 x-ray right foot: Soft tissue swelling    1/23 CT chest/abdomen/pelvis: Postoperative changes in the aortic root with small fluid density around the descending aorta which is nonspecific and may be postoperative; diffuse enlargement and hypodense appearance of the pancreas may be related to pancreatitis    ASSESSMENT/PLAN:  Acute pancreatitis with peripancreatic fluid collection  Recent aortic dissection status postsurgical repair  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  It is possible that all of her symptoms and even her fever are due to pancreatitis alone without associated infection.  I will follow-up GI evaluation.  Lipase is trending down today.  She confirms no current alcohol use.    At this time, I do not see any evidence of infection at right her foot.    I will follow-up pending blood cultures and clinical course.  For now, okay to continue vancomycin dosed for dialysis and Zosyn 3.375 IV every 12 hours.  Duration  is to be determined.    ID will follow.

## 2024-01-27 NOTE — CONSULTS
[facility]  Initial Inpatient Consult Note    Referring Provider: Lazaro    Reason for Consultation: Acute pancreatitis    Subjective     History of present illness:    33 y.o. female with end-stage renal disease on hemodialysis who is admitted yesterday with pancreatitis.  She developed abdominal pain and fever of 2 days duration.  Epigastric does not radiate.  Has never had pancreatitis in the past.  Consumed alcohol in the past but none recently.  Fever to 102 in the emergency room.  Lipase was 295.  CT imaging below.  ID is started vancomycin and Zosyn.    Past Medical History:  Past Medical History:   Diagnosis Date    Anasarca     PER CT SCAN    Dry skin     ESRD (end stage renal disease) on dialysis     TUES, THURS, SAT [address]    History of abdominal pain     History of anemia     History of transfusion     Hypertension     Iron deficiency anemia 09/27/2021    Lupus (systemic lupus erythematosus) 07/30/2022    Migraine     Other specified nutritional anemias     Pericardial effusion     Renal insufficiency     Seizures     STATES LAST WAS 1/2023    Shortness of breath     OCCASIONAL    Vitamin D deficiency 09/27/2021     Past Surgical History:  Past Surgical History:   Procedure Laterality Date    ASCENDING AORTIC ANEURYSM REPAIR W/ MECHANICAL AORTIC VALVE REPLACEMENT N/A 11/2/2023    Procedure: CHETNA STERNOTOMY, AORTIC ROOT REPLACEMENT WITH VALVE SPARING MISHEL PROCEDURE, REPLACEMENT OF ASCENDING AORTA, RIGHT FEMORAL DIALYSIS CATHETER PLACEMENT AND PRP;  Surgeon: Chris Medina MD;  Location: [facility] CVOR;  Service: Cardiothoracic;  Laterality: N/A;    CARDIAC CATHETERIZATION N/A 06/14/2023    Procedure: Coronary angiography;  Surgeon: Juana Taylor MD;  Location: [facility] CATH INVASIVE LOCATION;  Service: Cardiovascular;  Laterality: N/A;    CARDIAC CATHETERIZATION N/A 06/14/2023    Procedure: Left heart cath;  Surgeon: Juana Taylor MD;  Location: [facility] CATH  INVASIVE LOCATION;  Service: Cardiovascular;  Laterality: N/A;    CARDIAC CATHETERIZATION N/A 06/14/2023    Procedure: Right Heart Cath;  Surgeon: Juana Taylor MD;  Location: [facility] CATH INVASIVE LOCATION;  Service: Cardiovascular;  Laterality: N/A;    COLONOSCOPY N/A 7/20/2023    Procedure: COLONOSCOPY to cecum with biopsy;  Surgeon: Drew Kaminski MD;  Location: [facility] ENDOSCOPY;  Service: Gastroenterology;  Laterality: N/A;  PRE - diarrhea, constipation  POST - fair prep, normal    CORONARY ARTERY BYPASS GRAFT N/A 11/6/2023    Procedure: STERNAL EXPLORATION AND WASH OUT;  Surgeon: Jr Mitesh Quiroz MD;  Location: [facility] CVOR;  Service: Cardiothoracic;  Laterality: N/A;    ENDOSCOPY N/A 7/20/2023    Procedure: ESOPHAGOGASTRODUODENOSCOPY with biopsy;  Surgeon: Drew Kaminski MD;  Location: [facility] ENDOSCOPY;  Service: Gastroenterology;  Laterality: N/A;  PRE - abn ct abd  POST - gastritis    INSERTION HEMODIALYSIS CATHETER N/A 07/26/2022    Procedure: RIGHT TUNNELED DIALYSIS CATHETER PLACEMENT;  Surgeon: Diandra Adhikari MD;  Location: [facility] MAIN OR;  Service: Vascular;  Laterality: N/A;    INSERTION HEMODIALYSIS CATHETER Left 11/29/2023    Procedure: TUNNELED DIALYSIS CATHETER PLACEMENT;  Surgeon: Jose Patel II, MD;  Location: [facility] MAIN OR;  Service: Vascular;  Laterality: Left;    INSERTION PERITONEAL DIALYSIS CATHETER N/A 04/03/2023    Procedure: INSERTION PERITONEAL DIALYSIS CATHETER LAPAROSCOPIC, omentumpexy;  Surgeon: Jemal Loyola MD;  Location: [facility] MAIN OR;  Service: General;  Laterality: N/A;    REMOVAL PERITONEAL DIALYSIS CATHETER N/A 12/1/2023    Procedure: REMOVAL PERITONEAL DIALYSIS CATHETER;  Surgeon: Maryellen Ashton MD;  Location: [facility] MAIN OR;  Service: General;  Laterality: N/A;    TONSILLECTOMY        Social History:   Social History     Tobacco Use    Smoking status: Former     Types: Cigars     Passive exposure: Past    Smokeless tobacco: Never    Tobacco  comments:     Patient smoked black & mild   Substance Use Topics    Alcohol use: Yes     Comment: social      Family History:  Family History   Problem Relation Age of Onset    Autoimmune disease Mother     Anemia Mother     Diabetes Sister     Anemia Brother     Diabetes Maternal Grandmother     Hypertension Maternal Grandmother     Cancer Maternal Grandmother     Sickle cell anemia Cousin     Malig Hyperthermia Neg Hx        Home Meds:  Medications Prior to Admission   Medication Sig Dispense Refill Last Dose    amLODIPine (NORVASC) 10 MG tablet Take 1 tablet by mouth Daily.       aspirin 81 MG chewable tablet Chew 1 tablet Daily.       carvedilol (COREG) 25 MG tablet Take 1 tablet by mouth Every 12 (Twelve) Hours. 60 tablet 0     escitalopram (LEXAPRO) 10 MG tablet Take 1 tablet by mouth Daily.       folic acid (FOLVITE) 1 MG tablet Take 1 tablet by mouth Daily. 90 tablet 1     hydroxychloroquine (PLAQUENIL) 200 MG tablet Take 1 tablet by mouth Daily.       lacosamide (VIMPAT) 100 MG tablet tablet Take 1 tablet by mouth Every 12 (Twelve) Hours. 6 tablet 0     lisinopril (PRINIVIL,ZESTRIL) 10 MG tablet Take 1 tablet by mouth Daily.       mycophenolate (CELLCEPT) 500 MG tablet Take 0.5 tablets by mouth Every 12 (Twelve) Hours. 30 tablet 0     ondansetron (Zofran) 4 MG tablet Take 1 tablet by mouth Every 8 (Eight) Hours As Needed for Nausea or Vomiting. 30 tablet 1     oxyCODONE (ROXICODONE) 5 MG immediate release tablet Take 1 tablet by mouth Every 6 (Six) Hours As Needed for Severe Pain.       polyethylene glycol (MIRALAX) 17 GM/SCOOP powder Take 17 g by mouth As Needed.        Current Meds:   carvedilol, 25 mg, Oral, Q12H  escitalopram, 10 mg, Oral, Daily  folic acid, 1 mg, Oral, Daily  hydroxychloroquine, 200 mg, Oral, Daily  lacosamide, 100 mg, Oral, Q12H  lisinopril, 10 mg, Oral, Q24H  piperacillin-tazobactam, 3.375 g, Intravenous, Q12H  Vancomycin Pharmacy Intermittent/Pulse Dosing, , Does not apply,  Daily      Allergies:  Allergies   Allergen Reactions    Minoxidil Hives and Other (See Comments)     Pericardial effusion .     Review of Systems  Is weakness and fatigue all other systems reviewed and negative     Objective     Vital Signs  Temp:  [97.8 °F (36.6 °C)-102.4 °F (39.1 °C)] 98.8 °F (37.1 °C)  Heart Rate:  [] 95  Resp:  [16-22] 20  BP: ()/() 133/89  Physical Exam:  General Appearance:    Alert, cooperative, in no acute distress   Head:    Normocephalic, without obvious abnormality, atraumatic   Eyes:          conjunctivae and sclerae normal, no   icterus   Throat:   no thrush, oral mucosa moist   Neck:   Supple, no adenopathy   Lungs:     Clear to auscultation bilaterally    Heart:    Regular rhythm and normal rate    Chest Wall:    No abnormalities observed   Abdomen:     Soft, nondistended, nontender; normal bowel sounds   Extremities:   no edema, no redness   Skin:   No bruising or rash   Psychiatric:  normal mood and insight     Results Review:   I reviewed the patient's new clinical results.    Results from last 7 days   Lab Units 01/27/24  0018 01/26/24  1654   WBC 10*3/mm3 6.21 5.45   HEMOGLOBIN g/dL 7.5* 7.2*   HEMATOCRIT % 24.2* 23.0*   PLATELETS 10*3/mm3 173 168     Results from last 7 days   Lab Units 01/27/24  0018 01/26/24  1654   SODIUM mmol/L 136 135*   POTASSIUM mmol/L 4.1 4.0   CHLORIDE mmol/L 100 98   CO2 mmol/L 25.0 27.4   BUN mg/dL 19 17   CREATININE mg/dL 6.45* 6.38*   CALCIUM mg/dL 8.5* 8.8   BILIRUBIN mg/dL  --  0.4   ALK PHOS U/L  --  65   ALT (SGPT) U/L  --  7   AST (SGOT) U/L  --  13   GLUCOSE mg/dL 89 89         Lab Results   Lab Value Date/Time    LIPASE 189 (H) 01/27/2024 0018    LIPASE 295 (H) 01/26/2024 1654    LIPASE 42 10/26/2023 0054    LIPASE 10 (L) 09/10/2023 1901    LIPASE 22 02/15/2023 0343    LIPASE 22 02/14/2023 2355    LIPASE 47 11/25/2022 2109    LIPASE 27 11/22/2022 1059    LIPASE 32 11/21/2022 2030       Radiology:  US Paracentesis   Final  Result       As described.               This report was finalized on 1/27/2024 10:02 AM by Dr. Norm Arshad M.D on Workstation: [id number]          CT Abdomen Pelvis Without Contrast   Final Result   :   1. Abnormal thickening of the distal pancreatic body and tail consistent   with pancreatitis. There appear to be pancreatic/peripancreatic   collections that extend superiorly adjacent to the stomach and measuring   up to 2 cm in thickness.   2. Mild diffuse ascites and mesenteric stranding. No evidence for bowel   obstruction.   3. Small left pleural effusion layers dependently.   4. Cardiomegaly. Previous median sternotomy.   5. Retroperitoneal soft tissue thickening particularly in the aortocaval   region most likely related to reactive adenopathy though recommend   follow-up with contrasted CT if possible.       Radiation dose reduction techniques were utilized, including automated   exposure control and exposure modulation based on body size.           This report was finalized on 1/26/2024 8:56 PM by Dr. Adolph Londono M.D on Workstation: [id number]          XR Foot 3+ View Right   Final Result   1. Soft tissue swelling of the right foot.   2. No other significant findings are noted.           This report was finalized on 1/26/2024 4:52 PM by Dr. Clyde Rayo M.D on Workstation: [id number]          US Abdomen Limited    (Results Pending)   CT Chest Without Contrast Diagnostic    (Results Pending)       Assessment & Plan   Active Hospital Problems    Diagnosis     **Acute pancreatitis     Sepsis     ESRD (end stage renal disease)     Chronic diastolic CHF (congestive heart failure)     Essential hypertension     Seizure disorder     Systemic lupus erythematosus     Iron deficiency anemia        Assessment:  Abdominal pain  Acute pancreatitis confirmed by imaging and elevated lipase  Recent aortic dissection status post repair  End-stage renal disease  Anemia  Right foot pain    Plan:  For  pancreatitis continue supportive care with IV fluids.  Antibiotics per infectious disease consultant.  Check lipid profile  Check right upper quadrant ultrasound  N.p.o. for now      I discussed the patients findings and my recommendations with patient and nursing staff.    Nickolas Hyde MD

## 2024-01-27 NOTE — NURSING NOTE
" here on unit to obtain pts lab orders for D-dimer & procalcitonin. Pt refusing labs at this time. Pt educated on condition and importance of monitoring labs & pt re-educated on following MD orders & treatment plan. Pt verbalizes understanding then states \"No I'm not being stuck again\" Attending MD made aware.  "

## 2024-01-27 NOTE — PROGRESS NOTES
[facility] Clinical Pharmacy Services: Piperacillin-Tazobactam Consult    Pt Name: Dee Herrera   : 1990    Indication: Sepsis    Relevant clinical data and objective history reviewed:    Past Medical History:   Diagnosis Date    Anasarca     PER CT SCAN    Dry skin     ESRD (end stage renal disease) on dialysis     TUES, THURS, SAT [address]    History of abdominal pain     History of anemia     History of transfusion     Hypertension     Iron deficiency anemia 2021    Lupus (systemic lupus erythematosus) 2022    Migraine     Other specified nutritional anemias     Pericardial effusion     Renal insufficiency     Seizures     STATES LAST WAS 2023    Shortness of breath     OCCASIONAL    Vitamin D deficiency 2021     Creatinine   Date Value Ref Range Status   2024 6.38 (H) 0.57 - 1.00 mg/dL Final   2024 2.36 (H) 0.57 - 1.00 mg/dL Final   2024 5.52 (H) 0.57 - 1.00 mg/dL Final   2023 7.29 (H) 0.55 - 1.02 mg/dL Final     BUN   Date Value Ref Range Status   2024 17 6 - 20 mg/dL Final   2023 24 (H) 7 - 19 mg/dL Final     Estimated Creatinine Clearance: 9.6 mL/min (A) (by C-G formula based on SCr of 6.38 mg/dL (H)).    Lab Results   Component Value Date    WBC 5.45 2024     Temp Readings from Last 3 Encounters:   24 (!) 102.4 °F (39.1 °C) (Oral)   01/10/24 98.8 °F (37.1 °C) (Oral)   24 98.6 °F (37 °C) (Oral)      Assessment/Plan  Estimated CrCl <20 mL/min at this time; BMI 18.01 kg/m2  Will start piperacillin-tazobactam 3.375 g IV every 12 hours for 5 days    Pharmacy will continue to follow daily while on piperacillin-tazobactam and adjust as needed. Thank you for this consult.    Ning Pierre [facility]  Clinical Pharmacist

## 2024-01-27 NOTE — PROGRESS NOTES
Name: Dee Herrera ADMIT: 2024   : 1990  PCP: Margarita Woods APRN    MRN: [id number] LOS: 1 days   AGE/SEX: 33 y.o. female  ROOM: [facility]     Subjective   Subjective   Patient continues with mid and upper abdominal pain.  Resolved nausea and vomiting.  Normal bowel movement yesterday without fresh bright blood per rectum or melena.  On and off fever and chills.  Continues with right foot pain.    Review of Systems  Cardiovascular/respiratory.  Positive cough productive of clear sputum.  Positive left-sided chest pain.  No shortness of breath.  No hemoptysis.  No ankle edema.  GI.  As above.  .  Minimal urine output with no dysuria.     Objective   Objective   Vital Signs  Temp:  [97.8 °F (36.6 °C)-102.4 °F (39.1 °C)] 98.8 °F (37.1 °C)  Heart Rate:  [] 95  Resp:  [16-22] 20  BP: ()/() 133/89  SpO2:  [95 %-100 %] 95 %  on   ;   Device (Oxygen Therapy): room air  No intake or output data in the 24 hours ending 24 1132  Body mass index is 17.31 kg/m².      24  0524   Weight: 45.8 kg (100 lb 14.4 oz)     Physical Exam  General.  Middle-aged female.  She is alert and oriented x 4.  In mild to moderate pain and no respiratory distress.  Eyes.  Pupils equal round and reactive.  Intact extraocular musculature.  No pallor or jaundice.  Oral cavity.  Moist mucous membrane.  Neck.  Supple.  No JVD.  No lymphadenopathy or thyromegaly.  Cardiovascular.  Regular rate and rhythm and grade 2 systolic murmur.  Chest.  Dialysis catheter on the left upper chest with healthy site.  Poor but clear to auscultation with no added sounds.  Abdomen.  Mildly distended.  Diffuse tenderness.  Normal bowel sounds.  No organomegaly.  Plus minus guarding and rebound.  Extremities.  No clubbing or cyanosis.  Trace right foot edema with tenderness.  CNS.  No acute focal neurological deficits.          Results Review:      Results from last 7 days   Lab Units 24  0018 24  1654   SODIUM  "mmol/L 136 135*   POTASSIUM mmol/L 4.1 4.0   CHLORIDE mmol/L 100 98   CO2 mmol/L 25.0 27.4   BUN mg/dL 19 17   CREATININE mg/dL 6.45* 6.38*   GLUCOSE mg/dL 89 89   CALCIUM mg/dL 8.5* 8.8   AST (SGOT) U/L  --  13   ALT (SGPT) U/L  --  7     Estimated Creatinine Clearance: 9.1 mL/min (A) (by C-G formula based on SCr of 6.45 mg/dL (H)).                      Results from last 7 days   Lab Units 01/27/24  0018 01/23/24  1552   MAGNESIUM mg/dL  --  1.9   PHOSPHORUS mg/dL 4.1  --            Invalid input(s): \"LDLCALC\"  Results from last 7 days   Lab Units 01/27/24  0018 01/26/24  1654   WBC 10*3/mm3 6.21 5.45   HEMOGLOBIN g/dL 7.5* 7.2*   HEMATOCRIT % 24.2* 23.0*   PLATELETS 10*3/mm3 173 168   MCV fL 82.9 82.1   MCH pg 25.7* 25.7*   MCHC g/dL 31.0* 31.3*   RDW % 15.2 15.5*   RDW-SD fl 44.8 45.8   MPV fL 10.2 9.6   NEUTROPHIL % % 75.8 72.8   LYMPHOCYTE % % 8.2* 10.6*   MONOCYTES % % 15.0* 14.3*   EOSINOPHIL % % 0.5 1.7   BASOPHIL % % 0.2 0.2   IMM GRAN % % 0.3 0.4   NEUTROS ABS 10*3/mm3 4.71 3.97   LYMPHS ABS 10*3/mm3 0.51* 0.58*   MONOS ABS 10*3/mm3 0.93* 0.78   EOS ABS 10*3/mm3 0.03 0.09   BASOS ABS 10*3/mm3 0.01 0.01   IMMATURE GRANS (ABS) 10*3/mm3 0.02 0.02   NRBC /100 WBC 0.0 0.0             Results from last 7 days   Lab Units 01/27/24  0018   LACTATE mmol/L 0.6     Results from last 7 days   Lab Units 01/26/24  1654   SED RATE mm/hr 48*   CRP mg/dL 10.55*     Results from last 7 days   Lab Units 01/27/24  0018 01/26/24  1654   LIPASE U/L 189* 295*         Results from last 7 days   Lab Units 01/26/24  2151   ADENOVIRUS DETECTION BY PCR  Not Detected   CORONAVIRUS 229E  Not Detected   CORONAVIRUS HKU1  Not Detected   CORONAVIRUS NL63  Not Detected   CORONAVIRUS OC43  Not Detected   HUMAN METAPNEUMOVIRUS  Not Detected   HUMAN RHINOVIRUS/ENTEROVIRUS  Not Detected   INFLUENZA B PCR  Not Detected   PARAINFLUENZA 1  Not Detected   PARAINFLUENZA VIRUS 2  Not Detected   PARAINFLUENZA VIRUS 3  Not Detected   PARAINFLUENZA " VIRUS 4  Not Detected   BORDETELLA PERTUSSIS PCR  Not Detected   CHLAMYDOPHILA PNEUMONIAE PCR  Not Detected   MYCOPLAMA PNEUMO PCR  Not Detected   INFLUENZA A PCR  Not Detected   RSV, PCR  Not Detected         Results from last 7 days   Lab Units 01/26/24  1654   URIC ACID mg/dL 3.6       Imaging:  Imaging Results (Last 24 Hours)       Procedure Component Value Units Date/Time    US Paracentesis [[id number]] Collected: 01/27/24 1001    Specimen: Body Fluid Updated: 01/27/24 1005    Narrative:      US Limited abdomen-     INDICATIONS: Abdominal ascites     TECHNIQUE: LIMITED ABDOMINAL ULTRASOUND     COMPARISON: None available     FINDINGS:     Grayscale ultrasound was used to assess for presence of abdominal  ascites. A small amount of abdominal ascites was present, but felt to be  insufficient for paracentesis at this time. No paracentesis was  performed.       Impression:         As described.           This report was finalized on 1/27/2024 10:02 AM by Dr. Norm Arshad M.D on Workstation: [email or web address]       CT Abdomen Pelvis Without Contrast [[id number]] Collected: 01/26/24 1904     Updated: 01/26/24 2059    Narrative:      CT ABDOMEN AND PELVIS WITHOUT IV CONTRAST     HISTORY: Abdominal pain with nausea and vomiting     TECHNIQUE:  CT includes axial imaging from the lung bases to the  trochanters without intravenous contrast and without use of oral  contrast. Data reconstructed in coronal and sagittal planes. Radiation  dose reduction techniques were utilized, including automated exposure  control and exposure modulation based on body size.     COMPARISON: CT abdomen and pelvis 02/14/2023     FINDINGS: Small left pleural effusion layers dependently. The heart size  is enlarged. There has been previous median sternotomy which is not yet  united.     There is moderate intra-abdominal ascites. Perihepatic and perisplenic  fluid are present. Generalized mesenteric edema and stranding are  present. The presence of  ascites and mesenteric stranding and edema  limits evaluation for focal inflammatory process. Stomach is mostly  decompressed.     There appears to be partly loculated fluid extending along the left  lateral margin of the stomach measuring up to 19 mm in thickness and  extending along the greater curvature. There is also soft tissue  thickening surrounding the pancreatic tail with potential small  collections or developing collections. Edema and soft tissue thickening  surrounds the left adrenal gland which also appears thickened. The right  adrenal gland appears normal.     There is no bowel dilatation or evidence for bowel obstruction. There is  no evidence for free intraperitoneal air.     There is soft tissue thickening in the retroperitoneum particularly  extending between the abdominal aorta and IVC suspected to represent  antolin enlargement and this is most likely reactive though evaluation is  limited without IV contrast.       Impression:      :  1. Abnormal thickening of the distal pancreatic body and tail consistent  with pancreatitis. There appear to be pancreatic/peripancreatic  collections that extend superiorly adjacent to the stomach and measuring  up to 2 cm in thickness.  2. Mild diffuse ascites and mesenteric stranding. No evidence for bowel  obstruction.  3. Small left pleural effusion layers dependently.  4. Cardiomegaly. Previous median sternotomy.  5. Retroperitoneal soft tissue thickening particularly in the aortocaval  region most likely related to reactive adenopathy though recommend  follow-up with contrasted CT if possible.     Radiation dose reduction techniques were utilized, including automated  exposure control and exposure modulation based on body size.        This report was finalized on 1/26/2024 8:56 PM by Dr. Adolph Londono M.D on Workstation: [id number]       XR Foot 3+ View Right [[id number]] Collected: 01/26/24 1651     Updated: 01/26/24 1655    Narrative:      XR FOOT 3+ VW  RIGHT-1/26/2024     HISTORY: Right foot pain and swelling.     There is moderate soft tissue swelling of the right foot. No fractures  or other acute bony abnormalities are seen. No abnormal air collections  are seen.       Impression:      1. Soft tissue swelling of the right foot.  2. No other significant findings are noted.        This report was finalized on 1/26/2024 4:52 PM by Dr. Clyde Rayo M.D on Workstation: [id number]                  I reviewed the patient's new clinical results / labs / tests / procedures      Assessment/Plan     Active Hospital Problems    Diagnosis  POA    **Acute pancreatitis [K85.90]  Yes    Sepsis [A41.9]  No    ESRD (end stage renal disease) [N18.6]  Yes    Chronic diastolic CHF (congestive heart failure) [I50.32]  Yes    Essential hypertension [I10]  Yes    Seizure disorder [G40.909]  Yes    Systemic lupus erythematosus [M32.9]  Yes    Iron deficiency anemia [D50.9]  Yes      Resolved Hospital Problems   No resolved problems to display.           Abdominal pain/nausea and vomiting secondary to pancreatitis/peritonitis.  Patient does not consume alcohol.  Normal liver function test.  Improving lipase.  Continue NPO.  Check right upper quadrant ultrasound and fasting lipid.  No incriminating drug.  Consult GI.  Pain control.  IV antibiotics (Zosyn and vancomycin).  Pain control with Dilaudid.  Fever.  Differential diagnosis intra-abdominal (infected peritoneal fluid)/cellulitis of the right foot/endocarditis in a patient with a history of bioprosthetic aortic valve/dialysis catheter infection/aortitis.  White count is normal.  Respiratory PCR panel is negative.  Healthy dialysis catheter site.  In view of chest pain and cough will check CT scan of the chest without contrast.  Check procalcitonin awaiting blood cultures.  Patient had recent echo on 12/23 revealing a normal ejection fraction, moderate left ventricular hypertrophy, but prosthetic aortic valve with mild  regurgitation.  Will consult ID.  History of lupus nephritis/end-stage renal disease on hemodialysis TTS.  Volume status appears to be normal.  Will continue CellCept and consult nephrology.  Hypertension/status post prosthetic aortic valve replacement/dissecting ascending aneurysm.  Positive atypical chest pain.  Will check CT scan of the chest.  No evidence clinically of angina or congestive heart failure.  Check blood cultures to rule out endocarditis.  Iron deficiency/folate deficiency/anemia of chronic disease.  Continue folate and transfuse if hemoglobin less than 7 with dialysis.  Blood pressure controlled.  Then nephrology stop Norvasc.  Will continue Coreg and lisinopril.  Hold aspirin for now.  Seizure disorder.  Stable on Vimpat.  Right foot pain and swelling.  Possible cellulitis.  Will check MRSA.  Continue vancomycin and Zosyn.  Right lower extremity venous ultrasound is negative.  Right foot x-ray with soft tissue swelling.  VTE prophylaxis.  Sequential compression devices.      Discussed my findings and plan of treatment with the patient/GI.  Disposition.  To be determined based on clinical course.        Lakisha Hunt MD  [facility]  01/27/24  11:32 EST

## 2024-01-28 ENCOUNTER — APPOINTMENT (OUTPATIENT)
Dept: CT IMAGING | Facility: HOSPITAL | Age: 34
End: 2024-01-28
Payer: MEDICARE

## 2024-01-28 LAB
ABO GROUP BLD: NORMAL
ALBUMIN SERPL-MCNC: 2.3 G/DL (ref 3.5–5.2)
ALBUMIN/GLOB SERPL: 0.7 G/DL
ALP SERPL-CCNC: 57 U/L (ref 39–117)
ALT SERPL W P-5'-P-CCNC: <5 U/L (ref 1–33)
ANION GAP SERPL CALCULATED.3IONS-SCNC: 14 MMOL/L (ref 5–15)
AST SERPL-CCNC: 10 U/L (ref 1–32)
BASOPHILS # BLD AUTO: 0.02 10*3/MM3 (ref 0–0.2)
BASOPHILS NFR BLD AUTO: 0.4 % (ref 0–1.5)
BILIRUB SERPL-MCNC: 0.5 MG/DL (ref 0–1.2)
BLD GP AB SCN SERPL QL: NEGATIVE
BUN SERPL-MCNC: 35 MG/DL (ref 6–20)
BUN/CREAT SERPL: 4.4 (ref 7–25)
CALCIUM SPEC-SCNC: 7.8 MG/DL (ref 8.6–10.5)
CHLORIDE SERPL-SCNC: 97 MMOL/L (ref 98–107)
CHOLEST SERPL-MCNC: 55 MG/DL (ref 0–200)
CO2 SERPL-SCNC: 22 MMOL/L (ref 22–29)
CREAT SERPL-MCNC: 7.96 MG/DL (ref 0.57–1)
D DIMER PPP FEU-MCNC: 12.59 MCGFEU/ML (ref 0–0.5)
DEPRECATED RDW RBC AUTO: 46.8 FL (ref 37–54)
EGFRCR SERPLBLD CKD-EPI 2021: 6.4 ML/MIN/1.73
EOSINOPHIL # BLD AUTO: 0.05 10*3/MM3 (ref 0–0.4)
EOSINOPHIL NFR BLD AUTO: 0.9 % (ref 0.3–6.2)
ERYTHROCYTE [DISTWIDTH] IN BLOOD BY AUTOMATED COUNT: 16.3 % (ref 12.3–15.4)
FERRITIN SERPL-MCNC: 1817 NG/ML (ref 13–150)
GLOBULIN UR ELPH-MCNC: 3.4 GM/DL
GLUCOSE SERPL-MCNC: 82 MG/DL (ref 65–99)
HCT VFR BLD AUTO: 20.4 % (ref 34–46.6)
HDLC SERPL-MCNC: 12 MG/DL (ref 40–60)
HGB BLD-MCNC: 6.3 G/DL (ref 12–15.9)
IMM GRANULOCYTES # BLD AUTO: 0.04 10*3/MM3 (ref 0–0.05)
IMM GRANULOCYTES NFR BLD AUTO: 0.7 % (ref 0–0.5)
IRON 24H UR-MRATE: 10 MCG/DL (ref 37–145)
IRON SATN MFR SERPL: 8 % (ref 20–50)
LDLC SERPL CALC-MCNC: 23 MG/DL (ref 0–100)
LDLC/HDLC SERPL: 1.87 {RATIO}
LIPASE SERPL-CCNC: 194 U/L (ref 13–60)
LYMPHOCYTES # BLD AUTO: 0.62 10*3/MM3 (ref 0.7–3.1)
LYMPHOCYTES NFR BLD AUTO: 11 % (ref 19.6–45.3)
MCH RBC QN AUTO: 24.5 PG (ref 26.6–33)
MCHC RBC AUTO-ENTMCNC: 30.9 G/DL (ref 31.5–35.7)
MCV RBC AUTO: 79.4 FL (ref 79–97)
MONOCYTES # BLD AUTO: 0.84 10*3/MM3 (ref 0.1–0.9)
MONOCYTES NFR BLD AUTO: 14.9 % (ref 5–12)
NEUTROPHILS NFR BLD AUTO: 4.05 10*3/MM3 (ref 1.7–7)
NEUTROPHILS NFR BLD AUTO: 72.1 % (ref 42.7–76)
NRBC BLD AUTO-RTO: 0 /100 WBC (ref 0–0.2)
PLATELET # BLD AUTO: 160 10*3/MM3 (ref 140–450)
PMV BLD AUTO: 10.6 FL (ref 6–12)
POTASSIUM SERPL-SCNC: 4.8 MMOL/L (ref 3.5–5.2)
PROCALCITONIN SERPL-MCNC: 0.95 NG/ML (ref 0–0.25)
PROT SERPL-MCNC: 5.7 G/DL (ref 6–8.5)
RBC # BLD AUTO: 2.57 10*6/MM3 (ref 3.77–5.28)
RH BLD: POSITIVE
SODIUM SERPL-SCNC: 133 MMOL/L (ref 136–145)
T&S EXPIRATION DATE: NORMAL
TIBC SERPL-MCNC: 118 MCG/DL (ref 298–536)
TRANSFERRIN SERPL-MCNC: 79 MG/DL (ref 200–360)
TRIGL SERPL-MCNC: 103 MG/DL (ref 0–150)
VANCOMYCIN SERPL-MCNC: 24.2 MCG/ML (ref 5–40)
VLDLC SERPL-MCNC: 20 MG/DL (ref 5–40)
WBC NRBC COR # BLD AUTO: 5.62 10*3/MM3 (ref 3.4–10.8)

## 2024-01-28 PROCEDURE — 84145 PROCALCITONIN (PCT): CPT | Performed by: INTERNAL MEDICINE

## 2024-01-28 PROCEDURE — 86900 BLOOD TYPING SEROLOGIC ABO: CPT

## 2024-01-28 PROCEDURE — 85379 FIBRIN DEGRADATION QUANT: CPT | Performed by: INTERNAL MEDICINE

## 2024-01-28 PROCEDURE — 86901 BLOOD TYPING SEROLOGIC RH(D): CPT | Performed by: INTERNAL MEDICINE

## 2024-01-28 PROCEDURE — 83540 ASSAY OF IRON: CPT | Performed by: INTERNAL MEDICINE

## 2024-01-28 PROCEDURE — 99232 SBSQ HOSP IP/OBS MODERATE 35: CPT | Performed by: INTERNAL MEDICINE

## 2024-01-28 PROCEDURE — 85025 COMPLETE CBC W/AUTO DIFF WBC: CPT | Performed by: INTERNAL MEDICINE

## 2024-01-28 PROCEDURE — 83690 ASSAY OF LIPASE: CPT | Performed by: INTERNAL MEDICINE

## 2024-01-28 PROCEDURE — 80061 LIPID PANEL: CPT | Performed by: INTERNAL MEDICINE

## 2024-01-28 PROCEDURE — 82728 ASSAY OF FERRITIN: CPT | Performed by: INTERNAL MEDICINE

## 2024-01-28 PROCEDURE — 84466 ASSAY OF TRANSFERRIN: CPT | Performed by: INTERNAL MEDICINE

## 2024-01-28 PROCEDURE — 80053 COMPREHEN METABOLIC PANEL: CPT | Performed by: INTERNAL MEDICINE

## 2024-01-28 PROCEDURE — 25010000002 PIPERACILLIN SOD-TAZOBACTAM PER 1 G: Performed by: HOSPITALIST

## 2024-01-28 PROCEDURE — 80202 ASSAY OF VANCOMYCIN: CPT | Performed by: HOSPITALIST

## 2024-01-28 PROCEDURE — 86900 BLOOD TYPING SEROLOGIC ABO: CPT | Performed by: INTERNAL MEDICINE

## 2024-01-28 PROCEDURE — 86923 COMPATIBILITY TEST ELECTRIC: CPT

## 2024-01-28 PROCEDURE — P9016 RBC LEUKOCYTES REDUCED: HCPCS

## 2024-01-28 PROCEDURE — 86850 RBC ANTIBODY SCREEN: CPT | Performed by: INTERNAL MEDICINE

## 2024-01-28 RX ORDER — HYDROMORPHONE HYDROCHLORIDE 1 MG/ML
0.5 INJECTION, SOLUTION INTRAMUSCULAR; INTRAVENOUS; SUBCUTANEOUS EVERY 4 HOURS PRN
Status: DISCONTINUED | OUTPATIENT
Start: 2024-01-28 | End: 2024-01-29 | Stop reason: HOSPADM

## 2024-01-28 RX ADMIN — ACETAMINOPHEN 650 MG: 325 TABLET, FILM COATED ORAL at 02:22

## 2024-01-28 RX ADMIN — LACOSAMIDE 100 MG: 100 TABLET, FILM COATED ORAL at 21:44

## 2024-01-28 RX ADMIN — CARVEDILOL 25 MG: 25 TABLET, FILM COATED ORAL at 10:42

## 2024-01-28 RX ADMIN — FOLIC ACID 1 MG: 1 TABLET ORAL at 10:41

## 2024-01-28 RX ADMIN — LISINOPRIL 10 MG: 10 TABLET ORAL at 10:42

## 2024-01-28 RX ADMIN — ESCITALOPRAM OXALATE 10 MG: 10 TABLET, FILM COATED ORAL at 10:42

## 2024-01-28 RX ADMIN — HYDROXYCHLOROQUINE SULFATE 200 MG: 200 TABLET ORAL at 10:42

## 2024-01-28 RX ADMIN — CARVEDILOL 25 MG: 25 TABLET, FILM COATED ORAL at 21:44

## 2024-01-28 RX ADMIN — OXYCODONE HYDROCHLORIDE 5 MG: 5 TABLET ORAL at 21:44

## 2024-01-28 RX ADMIN — PIPERACILLIN SODIUM AND TAZOBACTAM SODIUM 3.38 G: 3; .375 INJECTION, SOLUTION INTRAVENOUS at 17:57

## 2024-01-28 RX ADMIN — LACOSAMIDE 100 MG: 100 TABLET, FILM COATED ORAL at 10:41

## 2024-01-28 NOTE — PROGRESS NOTES
[facility] Clinical Pharmacy Services: Vancomycin Monitoring Note    Dee Herrera is a 33 y.o. female who is on day 3/5 of pharmacy to dose vancomycin for Sepsis.    Previous Vancomycin Dose: Intermittent   Updated Cultures and Sensitivities: NGTD  Results from last 7 days   Lab Units 01/28/24  0408   VANCOMYCIN RM mcg/mL 24.20     Vitals/Labs  Ht:  ; Wt: 45.8 kg (100 lb 14.4 oz)   Temp Readings from Last 1 Encounters:   01/28/24 98 °F (36.7 °C) (Oral)     Estimated Creatinine Clearance: 7.4 mL/min (A) (by C-G formula based on SCr of 7.96 mg/dL (H)).   Dialysis TTS--IP schedule TBD    Results from last 7 days   Lab Units 01/28/24  0408 01/27/24  0018 01/26/24  1654   CREATININE mg/dL 7.96* 6.45* 6.38*   WBC 10*3/mm3 5.62 6.21 5.45     Assessment/Plan  ESRD on dialysis (OP schedule TTSat). Dialysis planned for this am. Random level collected after initial dose returned at 24.2 mg/L (~26 hr level). Will plan for a modest supplemental dose post dialysis this afternoon.     Current Vancomycin Dose: 500 mg x 1   Next Level Date and Time: Repeat random to be ordered pending finalization of inpatient dialysis schedule   We will continue to monitor patient changes and renal function     Thank you for involving pharmacy in this patient's care. Please contact pharmacy with any questions or concerns.       Cheyenne Gowers, [facility]  Clinical Pharmacist

## 2024-01-28 NOTE — PROGRESS NOTES
Name: Dee Herrera ADMIT: 2024   : 1990  PCP: Margarita Woods APRN    MRN: [id number] LOS: 2 days   AGE/SEX: 33 y.o. female  ROOM: [facility]     Subjective   Subjective   Decreased abdominal pain.  No nausea or vomiting.  Resolved nausea and vomiting.  Normal bowel movement yesterday without fresh bright blood per rectum or melena.  No fever or chills.  Improved with right foot pain.    Review of Systems  Cardiovascular/respiratory.  Positive cough productive of clear sputum.  Positive left-sided chest pain (improved)..  No shortness of breath.  No hemoptysis.  No ankle edema.  GI.  As above.  .  Minimal urine output with no dysuria.     Objective   Objective   Vital Signs  Temp:  [97.7 °F (36.5 °C)-99.9 °F (37.7 °C)] 97.9 °F (36.6 °C)  Heart Rate:  [72-98] 72  Resp:  [16-18] 16  BP: (111-132)/(71-92) 123/82  SpO2:  [98 %-100 %] 100 %  on   ;   Device (Oxygen Therapy): room air    Intake/Output Summary (Last 24 hours) at 2024 1231  Last data filed at 2024 1022  Gross per 24 hour   Intake 350 ml   Output 2000 ml   Net -1650 ml     Body mass index is 17.31 kg/m².      24  0524   Weight: 45.8 kg (100 lb 14.4 oz)     Physical Exam  General.  Middle-aged female.  She is alert and oriented x 4.  No acute pain/distress/diaphoresis.  Normal mood and affect.    Eyes.  Pupils equal round and reactive.  Intact extraocular musculature.  No pallor or jaundice.  Oral cavity.  Moist mucous membrane.  Neck.  Supple.  No JVD.  No lymphadenopathy or thyromegaly.  Cardiovascular.  Regular rate and rhythm and grade 2 systolic murmur.  Chest.  Dialysis catheter on the left upper chest with healthy site.  Poor but clear to auscultation with no added sounds.  Abdomen.  Positive lower abdominal tenderness..  Normal bowel sounds.  No organomegaly.  No guarding or rebound.  Extremities.  No clubbing or cyanosis.  Trace right foot edema with tenderness.  CNS.  No acute focal neurological  deficits.          Results Review:      Results from last 7 days   Lab Units 01/28/24  0408 01/27/24  0018 01/26/24  1654   SODIUM mmol/L 133* 136 135*   POTASSIUM mmol/L 4.8 4.1 4.0   CHLORIDE mmol/L 97* 100 98   CO2 mmol/L 22.0 25.0 27.4   BUN mg/dL 35* 19 17   CREATININE mg/dL 7.96* 6.45* 6.38*   GLUCOSE mg/dL 82 89 89   CALCIUM mg/dL 7.8* 8.5* 8.8   AST (SGOT) U/L 10  --  13   ALT (SGPT) U/L <5  --  7     Estimated Creatinine Clearance: 7.4 mL/min (A) (by C-G formula based on SCr of 7.96 mg/dL (H)).                      Results from last 7 days   Lab Units 01/27/24  0018 01/23/24  1552   MAGNESIUM mg/dL  --  1.9   PHOSPHORUS mg/dL 4.1  --      Results from last 7 days   Lab Units 01/28/24  0408   CHOLESTEROL mg/dL 55   TRIGLYCERIDES mg/dL 103   HDL CHOL mg/dL 12*     Results from last 7 days   Lab Units 01/28/24  0408 01/27/24  0018 01/26/24  1654   WBC 10*3/mm3 5.62 6.21 5.45   HEMOGLOBIN g/dL 6.3* 7.5* 7.2*   HEMATOCRIT % 20.4* 24.2* 23.0*   PLATELETS 10*3/mm3 160 173 168   MCV fL 79.4 82.9 82.1   MCH pg 24.5* 25.7* 25.7*   MCHC g/dL 30.9* 31.0* 31.3*   RDW % 16.3* 15.2 15.5*   RDW-SD fl 46.8 44.8 45.8   MPV fL 10.6 10.2 9.6   NEUTROPHIL % % 72.1 75.8 72.8   LYMPHOCYTE % % 11.0* 8.2* 10.6*   MONOCYTES % % 14.9* 15.0* 14.3*   EOSINOPHIL % % 0.9 0.5 1.7   BASOPHIL % % 0.4 0.2 0.2   IMM GRAN % % 0.7* 0.3 0.4   NEUTROS ABS 10*3/mm3 4.05 4.71 3.97   LYMPHS ABS 10*3/mm3 0.62* 0.51* 0.58*   MONOS ABS 10*3/mm3 0.84 0.93* 0.78   EOS ABS 10*3/mm3 0.05 0.03 0.09   BASOS ABS 10*3/mm3 0.02 0.01 0.01   IMMATURE GRANS (ABS) 10*3/mm3 0.04 0.02 0.02   NRBC /100 WBC 0.0 0.0 0.0             Results from last 7 days   Lab Units 01/28/24  0408 01/27/24  0018   PROCALCITONIN ng/mL 0.95*  --    LACTATE mmol/L  --  0.6     Results from last 7 days   Lab Units 01/26/24  1654   SED RATE mm/hr 48*   CRP mg/dL 10.55*     Results from last 7 days   Lab Units 01/28/24  0408 01/27/24  0018 01/26/24  1654   LIPASE U/L 194* 189* 295*      Results from last 7 days   Lab Units 01/27/24  0018   BLOODCX  No growth at 24 hours  No growth at 24 hours     Results from last 7 days   Lab Units 01/26/24  2151   ADENOVIRUS DETECTION BY PCR  Not Detected   CORONAVIRUS 229E  Not Detected   CORONAVIRUS HKU1  Not Detected   CORONAVIRUS NL63  Not Detected   CORONAVIRUS OC43  Not Detected   HUMAN METAPNEUMOVIRUS  Not Detected   HUMAN RHINOVIRUS/ENTEROVIRUS  Not Detected   INFLUENZA B PCR  Not Detected   PARAINFLUENZA 1  Not Detected   PARAINFLUENZA VIRUS 2  Not Detected   PARAINFLUENZA VIRUS 3  Not Detected   PARAINFLUENZA VIRUS 4  Not Detected   BORDETELLA PERTUSSIS PCR  Not Detected   CHLAMYDOPHILA PNEUMONIAE PCR  Not Detected   MYCOPLAMA PNEUMO PCR  Not Detected   INFLUENZA A PCR  Not Detected   RSV, PCR  Not Detected         Results from last 7 days   Lab Units 01/26/24  1654   URIC ACID mg/dL 3.6       Imaging:  Imaging Results (Last 24 Hours)       Procedure Component Value Units Date/Time    US Abdomen Limited [[id number]] Collected: 01/27/24 1404     Updated: 01/27/24 1526    Narrative:      RIGHT UPPER QUADRANT SONOGRAM     HISTORY: Abdominal pain with nausea and vomiting.     COMPARISON: CT abdomen and pelvis 1/26/2024.     FINDINGS: Liver measures 15.2 cm in craniocaudal dimension. Gallbladder  appears within normal limits. There is mild ascites with perihepatic  fluid.     Right kidney measures 6.7 cm which is atrophic. There is no  hydronephrosis. There is no evidence for intrahepatic or extrahepatic  biliary ductal dilatation. Pancreas is not well visualized..       Impression:      Mild ascites with perihepatic fluid. No evidence for  cholelithiasis or cholecystitis. Pancreas is not well visualized due to  overlapping bowel gas.     This report was finalized on 1/27/2024 3:23 PM by Dr. Adolph Londono M.D on Workstation: [id number]                  I reviewed the patient's new clinical results / labs / tests / procedures      Assessment/Plan      Active Hospital Problems    Diagnosis  POA    **Acute pancreatitis [K85.90]  Yes    Sepsis [A41.9]  No    ESRD (end stage renal disease) [N18.6]  Yes    Chronic diastolic CHF (congestive heart failure) [I50.32]  Yes    Essential hypertension [I10]  Yes    Seizure disorder [G40.909]  Yes    Systemic lupus erythematosus [M32.9]  Yes    Iron deficiency anemia [D50.9]  Yes      Resolved Hospital Problems   No resolved problems to display.           Abdominal pain/nausea and vomiting secondary to pancreatitis/peritonitis.  Patient does not consume alcohol.  Normal liver function test.  Improving lipase.  Right upper quadrant ultrasound with no gallbladder abnormalities.  Triglycerides are normal.  No incriminating drugs.  Unclear etiology of the pancreatitis.  Clinically improving.  Will advance diet.  Decrease frequency of Dilaudid.  Fever/elevated procalcitonin..  Differential diagnosis pancreatitis/intra-abdominal (infected peritoneal fluid) infectious disease does not believe there is any right foot cellulitis.  Infectious disease believes pancreatitis is to be the cause.  Resolved fever and chills.  White count remains normal.  Procalcitonin is elevated but the patient has renal failure and it is difficult to interpret that.  Blood cultures negative.  Respiratory PCR panel is negative.  Healthy dialysis catheter site.  Awaiting CT scan of the chest.  Patient had recent echo on 12/23 revealing a normal ejection fraction, moderate left ventricular hypertrophy, but prosthetic aortic valve with mild regurgitation.  She had ID help   history of lupus nephritis/end-stage renal disease on hemodialysis TTS.  Volume status appears to be normal.  Will continue CellCept and dialysis per nephrology   hypertension/status post prosthetic aortic valve replacement/dissecting ascending aneurysm.  Positive atypical chest pain.  Positive D-dimer.  Will check CT scan of the chest.  No evidence clinically of angina or congestive  heart failure.  Blood cultures are negative till now.  Continue Coreg and lisinopril.  Norvasc DC'd.  Aspirin on hold for now.  Good blood pressure control.  Iron deficiency/folate deficiency/anemia of chronic disease.  Will be transfused during dialysis.  Continue folate and transfuse if hemoglobin less than 7 with dialysis.  Seizure disorder.  Stable on Vimpat.  Right foot pain and swelling.  ID doubt cellulitis.  MRSA screen is negative.  Vancomycin DC'd.  Currently on Zosyn.  Improving pain and swelling right lower extremity.   Right lower extremity venous ultrasound is negative.  Right foot x-ray with soft tissue swelling.  VTE prophylaxis.  Sequential compression devices.      Discussed my findings and plan of treatment with the patient/GI.  Disposition.  To be determined based on clinical course.  Suspect home in 1 to 2 days.        Lakisha Hunt MD  [facility]  01/28/24  12:31 EST

## 2024-01-28 NOTE — PROGRESS NOTES
ID note    Chief complaint: Follow-up fever/peritonitis/pancreatitis     Subjective: No acute events.  No fever.  She says her abdominal pain is improved.  She is eating a [employer] sandwich this morning.  She denies any foot pain today.      Medications:    Current Facility-Administered Medications:     acetaminophen (TYLENOL) tablet 650 mg, 650 mg, Oral, Q4H PRN, Dariusz Lang APRN, 650 mg at 01/28/24 0222    aluminum-magnesium hydroxide-simethicone (MAALOX MAX) 400-400-40 MG/5ML suspension 15 mL, 15 mL, Oral, Q6H PRN, Dariusz Lang, APRN    carvedilol (COREG) tablet 25 mg, 25 mg, Oral, Q12H, Stephen Castillo MD, 25 mg at 01/28/24 1042    escitalopram (LEXAPRO) tablet 10 mg, 10 mg, Oral, Daily, Stephen Castillo MD, 10 mg at 01/28/24 1042    folic acid (FOLVITE) tablet 1 mg, 1 mg, Oral, Daily, Stephen Castillo MD, 1 mg at 01/28/24 1041    HYDROmorphone (DILAUDID) injection 1 mg, 1 mg, Intravenous, Q2H PRN, Lakisha Hunt MD, 1 mg at 01/27/24 1441    hydroxychloroquine (PLAQUENIL) tablet 200 mg, 200 mg, Oral, Daily, Stephen Castillo MD, 200 mg at 01/28/24 1042    labetalol (NORMODYNE,TRANDATE) injection 20 mg, 20 mg, Intravenous, Q2H PRN, Stephen Castillo MD    lacosamide (VIMPAT) tablet 100 mg, 100 mg, Oral, Q12H, Stephen Castillo MD, 100 mg at 01/28/24 1041    lisinopril (PRINIVIL,ZESTRIL) tablet 10 mg, 10 mg, Oral, Q24H, Stephen Castillo MD, 10 mg at 01/28/24 1042    nitroglycerin (NITROSTAT) SL tablet 0.4 mg, 0.4 mg, Sublingual, Q5 Min PRN, Dariusz Lang APRN    ondansetron ODT (ZOFRAN-ODT) disintegrating tablet 4 mg, 4 mg, Oral, Q6H PRN **OR** ondansetron (ZOFRAN) injection 4 mg, 4 mg, Intravenous, Q6H PRN, Dariusz Lang APRN    oxyCODONE (ROXICODONE) immediate release tablet 5 mg, 5 mg, Oral, Q6H PRN, Stephen Castillo MD, 5 mg at 01/27/24 4518    piperacillin-tazobactam (ZOSYN) 3.375 g in iso-osmotic  dextrose 50 ml (premix), 3.375 g, Intravenous, Q12H, Stephen Castillo MD, 3.375 g at 01/27/24 1740    [COMPLETED] Insert Peripheral IV, , , Once **AND** sodium chloride 0.9 % flush 10 mL, 10 mL, Intravenous, PRN, Johnathan Hughes MD    sodium chloride 0.9 % flush 10 mL, 10 mL, Intravenous, PRN, Dariusz Lang, APRN      Objective   Vital Signs   Temp:  [97.7 °F (36.5 °C)-99.9 °F (37.7 °C)] 97.9 °F (36.6 °C)  Heart Rate:  [72-98] 72  Resp:  [16-20] 16  BP: (111-133)/(71-92) 123/82    Physical Exam:   General: awake, alert, very nice, eating a sandwich  Eyes: no scleral icterus  ENT: no thrush  Cardiovascular: NR  Respiratory: normal work of breathing; no wheezing  GI: Abdomen is soft, mildly tender, + bowel sounds in all four quadrants  :  no Stark catheter  Skin: No rashes  Neurological: Alert and oriented x 3  Psychiatric: Normal mood and affect   Vasc: L chest HD catheter w/o erythema    Labs:   CBC, CMP, lipase, and blood cultures reviewed today  Lab Results   Component Value Date    WBC 5.62 01/28/2024    HGB 6.3 (C) 01/28/2024    HCT 20.4 (C) 01/28/2024    MCV 79.4 01/28/2024     01/28/2024       Lab Results   Component Value Date    GLUCOSE 82 01/28/2024    BUN 35 (H) 01/28/2024    CREATININE 7.96 (H) 01/28/2024    EGFRIFAFRI 8 (L) 03/20/2023    BCR 4.4 (L) 01/28/2024    CO2 22.0 01/28/2024    CALCIUM 7.8 (L) 01/28/2024    PROTENTOTREF 5.4 (L) 12/09/2022    ALBUMIN 2.3 (L) 01/28/2024    LABIL2 1.3 03/20/2023    AST 10 01/28/2024    ALT <5 01/28/2024     Lab Results   Component Value Date    LIPASE 194 (H) 01/28/2024     Lab Results   Component Value Date    CRP 10.55 (H) 01/26/2024     Lab Results   Component Value Date    URICACID 3.6 01/26/2024     hCG qualitative: Negative  Procalcitonin 0.9    Microbiology:  1/26/2024 RPP: Negative  1/27/2024  BCx: Negative to date    ASSESSMENT/PLAN:  Acute pancreatitis with peripancreatic fluid collection  Recent aortic dissection status  postsurgical repair  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  It is possible that all of her symptoms and even her fever are due to pancreatitis alone without associated infection.  No evidence of MRSA so we will stop vancomycin.  I will continue Zosyn for 1 more day.  At this time, I do not see any evidence of infection at right her foot.    Procalcitonin elevated at 0.9.  However this is virtually uninterpretable because it is falsely elevated with ESRD and pancreatitis both of which she has.    ID will follow.

## 2024-01-28 NOTE — PROGRESS NOTES
[facility]  Inpatient Progress Note    Reason for Follow Up: Acute pancreatitis    Subjective     Interval History:   No abdominal pain today, triglycerides normal, ultrasound of the abdomen negative, wants to eat a low-fat diet    Current Facility-Administered Medications:     acetaminophen (TYLENOL) tablet 650 mg, 650 mg, Oral, Q4H PRN, Dariusz Lang APRN, 650 mg at 01/28/24 0222    aluminum-magnesium hydroxide-simethicone (MAALOX MAX) 400-400-40 MG/5ML suspension 15 mL, 15 mL, Oral, Q6H PRN, Dariusz Lang APRN    carvedilol (COREG) tablet 25 mg, 25 mg, Oral, Q12H, Stephen Castillo MD, 25 mg at 01/28/24 1042    escitalopram (LEXAPRO) tablet 10 mg, 10 mg, Oral, Daily, Stephen Castillo MD, 10 mg at 01/28/24 1042    folic acid (FOLVITE) tablet 1 mg, 1 mg, Oral, Daily, Stephen Castillo MD, 1 mg at 01/28/24 1041    HYDROmorphone (DILAUDID) injection 0.5 mg, 0.5 mg, Intravenous, Q4H PRN, Lakisha Hunt MD    hydroxychloroquine (PLAQUENIL) tablet 200 mg, 200 mg, Oral, Daily, Stephen Castillo MD, 200 mg at 01/28/24 1042    labetalol (NORMODYNE,TRANDATE) injection 20 mg, 20 mg, Intravenous, Q2H PRN, Stephen Castillo MD    lacosamide (VIMPAT) tablet 100 mg, 100 mg, Oral, Q12H, Stephen Castillo MD, 100 mg at 01/28/24 1041    lisinopril (PRINIVIL,ZESTRIL) tablet 10 mg, 10 mg, Oral, Q24H, Stephen Castillo MD, 10 mg at 01/28/24 1042    nitroglycerin (NITROSTAT) SL tablet 0.4 mg, 0.4 mg, Sublingual, Q5 Min PRN, Rickey, Jacquetta N, APRN    ondansetron ODT (ZOFRAN-ODT) disintegrating tablet 4 mg, 4 mg, Oral, Q6H PRN **OR** ondansetron (ZOFRAN) injection 4 mg, 4 mg, Intravenous, Q6H PRN, Dariusz Lang APRN    oxyCODONE (ROXICODONE) immediate release tablet 5 mg, 5 mg, Oral, Q6H PRN, Stephen Castillo MD, 5 mg at 01/27/24 0074    piperacillin-tazobactam (ZOSYN) 3.375 g in iso-osmotic dextrose 50 ml  (premix), 3.375 g, Intravenous, Q12H, Stephen Castillo MD, 3.375 g at 01/27/24 1740    [COMPLETED] Insert Peripheral IV, , , Once **AND** sodium chloride 0.9 % flush 10 mL, 10 mL, Intravenous, PRN, Johnathan Hughes MD    sodium chloride 0.9 % flush 10 mL, 10 mL, Intravenous, PRN, Dariusz Lang, APRN  Review of Systems:    There is weakness of fatigue all other systems reviewed and negative    Objective     Vital Signs  Temp:  [97.7 °F (36.5 °C)-99.9 °F (37.7 °C)] 97.9 °F (36.6 °C)  Heart Rate:  [72-98] 72  Resp:  [16-18] 16  BP: (111-132)/(71-92) 123/82  Body mass index is 17.31 kg/m².    Intake/Output Summary (Last 24 hours) at 1/28/2024 1417  Last data filed at 1/28/2024 1022  Gross per 24 hour   Intake 350 ml   Output 2000 ml   Net -1650 ml     I/O this shift:  In: 300 [Blood:300]  Out: 2000      Physical Exam:   General: patient awake, alert and cooperative   Eyes: Normal lids and lashes, no scleral icterus   Neck: supple, normal ROM   Skin: warm and dry, not jaundiced   Cardiovascular: regular rhythm and rate, no murmurs auscultated   Pulm: clear to auscultation bilaterally, regular and unlabored   Abdomen: soft, nontender, nondistended; normal bowel sounds   Extremities: no rash or edema   Psychiatric: Normal mood and behavior; memory intact     Results Review:     I reviewed the patient's new clinical results.    Results from last 7 days   Lab Units 01/28/24  0408 01/27/24  0018 01/26/24  1654   WBC 10*3/mm3 5.62 6.21 5.45   HEMOGLOBIN g/dL 6.3* 7.5* 7.2*   HEMATOCRIT % 20.4* 24.2* 23.0*   PLATELETS 10*3/mm3 160 173 168     Results from last 7 days   Lab Units 01/28/24  0408 01/27/24  0018 01/26/24  1654   SODIUM mmol/L 133* 136 135*   POTASSIUM mmol/L 4.8 4.1 4.0   CHLORIDE mmol/L 97* 100 98   CO2 mmol/L 22.0 25.0 27.4   BUN mg/dL 35* 19 17   CREATININE mg/dL 7.96* 6.45* 6.38*   CALCIUM mg/dL 7.8* 8.5* 8.8   BILIRUBIN mg/dL 0.5  --  0.4   ALK PHOS U/L 57  --  65   ALT (SGPT) U/L <5  --  7    AST (SGOT) U/L 10  --  13   GLUCOSE mg/dL 82 89 89         Lab Results   Lab Value Date/Time    LIPASE 194 (H) 01/28/2024 0408    LIPASE 189 (H) 01/27/2024 0018    LIPASE 295 (H) 01/26/2024 1654    LIPASE 42 10/26/2023 0054    LIPASE 10 (L) 09/10/2023 1901    LIPASE 22 02/15/2023 0343    LIPASE 22 02/14/2023 2355    LIPASE 47 11/25/2022 2109    LIPASE 27 11/22/2022 1059    LIPASE 32 11/21/2022 2030       Radiology:  US Abdomen Limited   Final Result   Mild ascites with perihepatic fluid. No evidence for   cholelithiasis or cholecystitis. Pancreas is not well visualized due to   overlapping bowel gas.       This report was finalized on 1/27/2024 3:23 PM by Dr. Adolph Londono M.D on Workstation: [email or web address]          US Paracentesis   Final Result       As described.               This report was finalized on 1/27/2024 10:02 AM by Dr. Norm Arshad M.D on Workstation: [email or web address]          CT Abdomen Pelvis Without Contrast   Final Result   :   1. Abnormal thickening of the distal pancreatic body and tail consistent   with pancreatitis. There appear to be pancreatic/peripancreatic   collections that extend superiorly adjacent to the stomach and measuring   up to 2 cm in thickness.   2. Mild diffuse ascites and mesenteric stranding. No evidence for bowel   obstruction.   3. Small left pleural effusion layers dependently.   4. Cardiomegaly. Previous median sternotomy.   5. Retroperitoneal soft tissue thickening particularly in the aortocaval   region most likely related to reactive adenopathy though recommend   follow-up with contrasted CT if possible.       Radiation dose reduction techniques were utilized, including automated   exposure control and exposure modulation based on body size.           This report was finalized on 1/26/2024 8:56 PM by Dr. Adolph Londono M.D on Workstation: [email or web address]          XR Foot 3+ View Right   Final Result   1. Soft tissue swelling of the right foot.   2. No other  significant findings are noted.           This report was finalized on 1/26/2024 4:52 PM by Dr. Clyde Rayo M.D on Workstation: [id number]          CT Chest Without Contrast Diagnostic    (Results Pending)       Assessment & Plan     Active Hospital Problems    Diagnosis     **Acute pancreatitis     Sepsis     ESRD (end stage renal disease)     Chronic diastolic CHF (congestive heart failure)     Essential hypertension     Seizure disorder     Systemic lupus erythematosus     Iron deficiency anemia        Assessment:  Abdominal pain  Acute pancreatitis confirmed by imaging and elevated lipase  Recent aortic dissection status post repair  End-stage renal disease  Anemia  Right foot pain     Plan:  She is pain-free wants to eat a low-fat diet  Lipid profile and ultrasound negative  Okay for diet  Pain control per primary team  No further GI recs we will see as needed     I discussed the patients findings and my recommendations with patient and nursing staff.    Nickolas Hyde MD

## 2024-01-28 NOTE — NURSING NOTE
Hgb and hct were critcally low this AM. Dr. Mendoza notified with nephrology. Ordered 1uRBC with dialysis this AM.

## 2024-01-28 NOTE — PROGRESS NOTES
LOS: 2 days   Patient Care Team:  Margarita Woods APRN as PCP - General (Nurse Practitioner)  Winnie Sanchez MD as Referring Physician (Obstetrics and Gynecology)  Norberto Almaraz MD PhD as Consulting Physician (Hematology and Oncology)  Lupis Thomas MD as Consulting Physician (Nephrology)  Hair Mckeon MD as Consulting Physician (Nephrology)  Juana Taylor MD as Consulting Physician (Cardiology)    Chief Complaint: ESRD    Subjective     History of Present Illness  Pt seen on dialysis  Had planned dialysis yesterday but pt refused.  No acute complaints, asking if she can eat.  Getting 1 unit prbc's with HD due to anemia.    Subjective:  Symptoms:  No shortness of breath, chest pain, chest pressure or anxiety.    Diet:  No nausea or vomiting.        History taken from: patient chart    Objective     Vital Sign Min/Max for last 24 hours  Temp  Min: 97.7 °F (36.5 °C)  Max: 99.9 °F (37.7 °C)   BP  Min: 111/71  Max: 133/89   Pulse  Min: 72  Max: 98   Resp  Min: 16  Max: 20   SpO2  Min: 98 %  Max: 100 %   No data recorded   No data recorded     Flowsheet Rows      Flowsheet Row First Filed Value   Admission Height --   Admission Weight 45.8 kg (100 lb 14.4 oz) Documented at 01/27/2024 0524            I/O this shift:  In: 300 [Blood:300]  Out: -   I/O last 3 completed shifts:  In: 50 [P.O.:50]  Out: -     Objective:  General Appearance:  Comfortable.    Vital signs: (most recent): Blood pressure 123/82, pulse 72, temperature 97.9 °F (36.6 °C), temperature source Oral, resp. rate 16, weight 45.8 kg (100 lb 14.4 oz), SpO2 100%, not currently breastfeeding.  Vital signs are normal.    HEENT: Normal HEENT exam.    Lungs:  Normal effort and normal respiratory rate.    Heart: Normal rate.  Regular rhythm.    Abdomen: Abdomen is soft.  Bowel sounds are normal.   There is no abdominal tenderness.     Extremities: Normal range of motion.    Pulses: Distal pulses are intact.    Neurological: Patient is  "alert and oriented to person, place and time.    Pupils:  Pupils are equal, round, and reactive to light.    Skin:  Warm and dry.                Results Review:     I reviewed the patient's new clinical results.  I reviewed the patient's other test results and agree with the interpretation    WBC WBC   Date Value Ref Range Status   01/28/2024 5.62 3.40 - 10.80 10*3/mm3 Final   01/27/2024 6.21 3.40 - 10.80 10*3/mm3 Final   01/26/2024 5.45 3.40 - 10.80 10*3/mm3 Final      HGB Hemoglobin   Date Value Ref Range Status   01/28/2024 6.3 (C) 12.0 - 15.9 g/dL Final   01/27/2024 7.5 (L) 12.0 - 15.9 g/dL Final   01/26/2024 7.2 (L) 12.0 - 15.9 g/dL Final      HCT Hematocrit   Date Value Ref Range Status   01/28/2024 20.4 (C) 34.0 - 46.6 % Final   01/27/2024 24.2 (L) 34.0 - 46.6 % Final   01/26/2024 23.0 (L) 34.0 - 46.6 % Final      Platlets No results found for: \"LABPLAT\"   MCV MCV   Date Value Ref Range Status   01/28/2024 79.4 79.0 - 97.0 fL Final   01/27/2024 82.9 79.0 - 97.0 fL Final   01/26/2024 82.1 79.0 - 97.0 fL Final          Sodium Sodium   Date Value Ref Range Status   01/28/2024 133 (L) 136 - 145 mmol/L Final   01/27/2024 136 136 - 145 mmol/L Final   01/26/2024 135 (L) 136 - 145 mmol/L Final      Potassium Potassium   Date Value Ref Range Status   01/28/2024 4.8 3.5 - 5.2 mmol/L Final   01/27/2024 4.1 3.5 - 5.2 mmol/L Final   01/26/2024 4.0 3.5 - 5.2 mmol/L Final     Comment:     Slight hemolysis detected by analyzer. Result may be falsely elevated.      Chloride Chloride   Date Value Ref Range Status   01/28/2024 97 (L) 98 - 107 mmol/L Final   01/27/2024 100 98 - 107 mmol/L Final   01/26/2024 98 98 - 107 mmol/L Final      CO2 CO2   Date Value Ref Range Status   01/28/2024 22.0 22.0 - 29.0 mmol/L Final   01/27/2024 25.0 22.0 - 29.0 mmol/L Final   01/26/2024 27.4 22.0 - 29.0 mmol/L Final      BUN BUN   Date Value Ref Range Status   01/28/2024 35 (H) 6 - 20 mg/dL Final   01/27/2024 19 6 - 20 mg/dL Final   01/26/2024 " "17 6 - 20 mg/dL Final      Creatinine Creatinine   Date Value Ref Range Status   01/28/2024 7.96 (H) 0.57 - 1.00 mg/dL Final   01/27/2024 6.45 (H) 0.57 - 1.00 mg/dL Final   01/26/2024 6.38 (H) 0.57 - 1.00 mg/dL Final      Calcium Calcium   Date Value Ref Range Status   01/28/2024 7.8 (L) 8.6 - 10.5 mg/dL Final   01/27/2024 8.5 (L) 8.6 - 10.5 mg/dL Final   01/26/2024 8.8 8.6 - 10.5 mg/dL Final      PO4 No results found for: \"CAPO4\"   Albumin Albumin   Date Value Ref Range Status   01/28/2024 2.3 (L) 3.5 - 5.2 g/dL Final   01/27/2024 2.3 (L) 3.5 - 5.2 g/dL Final   01/26/2024 2.6 (L) 3.5 - 5.2 g/dL Final      Magnesium No results found for: \"MG\"   Uric Acid Uric Acid   Date Value Ref Range Status   01/26/2024 3.6 2.4 - 5.7 mg/dL Final        Medication Review:   carvedilol, 25 mg, Oral, Q12H  escitalopram, 10 mg, Oral, Daily  folic acid, 1 mg, Oral, Daily  hydroxychloroquine, 200 mg, Oral, Daily  lacosamide, 100 mg, Oral, Q12H  lisinopril, 10 mg, Oral, Q24H  piperacillin-tazobactam, 3.375 g, Intravenous, Q12H  vancomycin, 500 mg, Intravenous, Once  Vancomycin Pharmacy Intermittent/Pulse Dosing, , Does not apply, Daily          Assessment & Plan       Acute pancreatitis    Iron deficiency anemia    Systemic lupus erythematosus    Seizure disorder    Essential hypertension    Chronic diastolic CHF (congestive heart failure)    ESRD (end stage renal disease)    Sepsis      Assessment & Plan  ESRD-  due to lupus.  Has [facility] for access.  Continue TTS schedule for now.  Noted pleural effusion on CT, UF as bp will tolerate.  Pancreatitis-  On emperic IV abx now.  ID/GI following. Had fevers, blood cx no growth at 24hrs.  Catheter looks ok.  Anemia-  Hgb down, getting 1 unit today with HD.  Tsat at 8% but ferritin at 1817 but actually trending down from previous.  May need to repeat and if trended down further give IV iron at that time.    H/o SLE-  had been on plaquenil and cellcept.  Cellcept on hold.  H/o aortic aneurysm- "  s/p repair 11/23.  HTN- on amlodipine/coreg.  Will hold amlodipine and see if we can get more UF with effusion noted.     Vinnie Mendoza MD  01/28/24  09:24 EST

## 2024-01-29 ENCOUNTER — READMISSION MANAGEMENT (OUTPATIENT)
Dept: CALL CENTER | Facility: HOSPITAL | Age: 34
End: 2024-01-29
Payer: MEDICARE

## 2024-01-29 ENCOUNTER — TELEPHONE (OUTPATIENT)
Dept: CARDIAC SURGERY | Facility: CLINIC | Age: 34
End: 2024-01-29
Payer: MEDICARE

## 2024-01-29 ENCOUNTER — APPOINTMENT (OUTPATIENT)
Dept: CT IMAGING | Facility: HOSPITAL | Age: 34
End: 2024-01-29
Payer: MEDICARE

## 2024-01-29 VITALS
DIASTOLIC BLOOD PRESSURE: 80 MMHG | RESPIRATION RATE: 18 BRPM | OXYGEN SATURATION: 91 % | SYSTOLIC BLOOD PRESSURE: 110 MMHG | HEART RATE: 78 BPM | BODY MASS INDEX: 18.6 KG/M2 | TEMPERATURE: 98.6 F | WEIGHT: 108.4 LBS

## 2024-01-29 DIAGNOSIS — Z98.890 S/P AORTIC DISSECTION REPAIR: Primary | ICD-10-CM

## 2024-01-29 PROBLEM — R50.9 FEVER: Status: ACTIVE | Noted: 2024-01-29

## 2024-01-29 PROBLEM — R50.9 FEVER: Status: RESOLVED | Noted: 2024-01-29 | Resolved: 2024-01-29

## 2024-01-29 PROBLEM — I71.21 THORACIC ASCENDING AORTIC ANEURYSM: Status: ACTIVE | Noted: 2024-01-29

## 2024-01-29 PROBLEM — A41.9 SEPSIS: Status: RESOLVED | Noted: 2024-01-27 | Resolved: 2024-01-29

## 2024-01-29 LAB
ALBUMIN SERPL-MCNC: 2 G/DL (ref 3.5–5.2)
ALBUMIN/GLOB SERPL: 0.6 G/DL
ALP SERPL-CCNC: 54 U/L (ref 39–117)
ALT SERPL W P-5'-P-CCNC: <5 U/L (ref 1–33)
ANION GAP SERPL CALCULATED.3IONS-SCNC: 10 MMOL/L (ref 5–15)
AST SERPL-CCNC: 8 U/L (ref 1–32)
BH BB BLOOD EXPIRATION DATE: NORMAL
BH BB BLOOD TYPE BARCODE: 5100
BH BB DISPENSE STATUS: NORMAL
BH BB PRODUCT CODE: NORMAL
BH BB UNIT NUMBER: NORMAL
BILIRUB SERPL-MCNC: 0.3 MG/DL (ref 0–1.2)
BUN SERPL-MCNC: 12 MG/DL (ref 6–20)
BUN/CREAT SERPL: 2.6 (ref 7–25)
CALCIUM SPEC-SCNC: 7.7 MG/DL (ref 8.6–10.5)
CHLORIDE SERPL-SCNC: 100 MMOL/L (ref 98–107)
CO2 SERPL-SCNC: 23 MMOL/L (ref 22–29)
CREAT SERPL-MCNC: 4.53 MG/DL (ref 0.57–1)
CROSSMATCH INTERPRETATION: NORMAL
DEPRECATED RDW RBC AUTO: 45.5 FL (ref 37–54)
EGFRCR SERPLBLD CKD-EPI 2021: 12.6 ML/MIN/1.73
ERYTHROCYTE [DISTWIDTH] IN BLOOD BY AUTOMATED COUNT: 15.7 % (ref 12.3–15.4)
GLOBULIN UR ELPH-MCNC: 3.2 GM/DL
GLUCOSE SERPL-MCNC: 87 MG/DL (ref 65–99)
HCT VFR BLD AUTO: 23.1 % (ref 34–46.6)
HGB BLD-MCNC: 7.2 G/DL (ref 12–15.9)
LIPASE SERPL-CCNC: 416 U/L (ref 13–60)
MCH RBC QN AUTO: 25 PG (ref 26.6–33)
MCHC RBC AUTO-ENTMCNC: 31.2 G/DL (ref 31.5–35.7)
MCV RBC AUTO: 80.2 FL (ref 79–97)
PLATELET # BLD AUTO: 137 10*3/MM3 (ref 140–450)
PMV BLD AUTO: 11.9 FL (ref 6–12)
POTASSIUM SERPL-SCNC: 3.6 MMOL/L (ref 3.5–5.2)
PROT SERPL-MCNC: 5.2 G/DL (ref 6–8.5)
RBC # BLD AUTO: 2.88 10*6/MM3 (ref 3.77–5.28)
SODIUM SERPL-SCNC: 133 MMOL/L (ref 136–145)
UNIT  ABO: NORMAL
UNIT  RH: NORMAL
WBC NRBC COR # BLD AUTO: 4.87 10*3/MM3 (ref 3.4–10.8)

## 2024-01-29 PROCEDURE — 85027 COMPLETE CBC AUTOMATED: CPT | Performed by: INTERNAL MEDICINE

## 2024-01-29 PROCEDURE — 71250 CT THORAX DX C-: CPT

## 2024-01-29 PROCEDURE — 25010000002 PIPERACILLIN SOD-TAZOBACTAM PER 1 G: Performed by: HOSPITALIST

## 2024-01-29 PROCEDURE — 99232 SBSQ HOSP IP/OBS MODERATE 35: CPT | Performed by: INTERNAL MEDICINE

## 2024-01-29 PROCEDURE — 99221 1ST HOSP IP/OBS SF/LOW 40: CPT | Performed by: NURSE PRACTITIONER

## 2024-01-29 PROCEDURE — 83690 ASSAY OF LIPASE: CPT | Performed by: INTERNAL MEDICINE

## 2024-01-29 PROCEDURE — 80053 COMPREHEN METABOLIC PANEL: CPT | Performed by: INTERNAL MEDICINE

## 2024-01-29 RX ADMIN — PIPERACILLIN SODIUM AND TAZOBACTAM SODIUM 3.38 G: 3; .375 INJECTION, SOLUTION INTRAVENOUS at 05:00

## 2024-01-29 RX ADMIN — OXYCODONE HYDROCHLORIDE 5 MG: 5 TABLET ORAL at 11:54

## 2024-01-29 RX ADMIN — FOLIC ACID 1 MG: 1 TABLET ORAL at 09:45

## 2024-01-29 RX ADMIN — LACOSAMIDE 100 MG: 100 TABLET, FILM COATED ORAL at 09:45

## 2024-01-29 RX ADMIN — CARVEDILOL 25 MG: 25 TABLET, FILM COATED ORAL at 09:45

## 2024-01-29 RX ADMIN — LISINOPRIL 10 MG: 10 TABLET ORAL at 09:45

## 2024-01-29 RX ADMIN — ESCITALOPRAM OXALATE 10 MG: 10 TABLET, FILM COATED ORAL at 09:45

## 2024-01-29 RX ADMIN — HYDROXYCHLOROQUINE SULFATE 200 MG: 200 TABLET ORAL at 09:45

## 2024-01-29 NOTE — TELEPHONE ENCOUNTER
Received a call from Dr. Londono for Dr. Medina to review CT chest. Per Dr. Medina, CT is stable and he recommends follow up with a CTA chest and office visit in April. Orders placed. Appointment with Dr. Medina scheduled for 4/9/24 at 11:00 am.

## 2024-01-29 NOTE — PLAN OF CARE
Goal Outcome Evaluation:      Pt tolerated ad italo activity well. Pt utilized call light and made needs known.

## 2024-01-29 NOTE — OUTREACH NOTE
Prep Survey      Flowsheet Row Responses   [facility] patient discharged from? [place]   Is LACE score < 7 ? No   Eligibility [facility]   Date of Admission 01/26/24   Date of Discharge 01/29/24   Discharge Disposition Home or Self Care   Discharge diagnosis Acute pancreatitis, ESRD on HD   Does the patient have one of the following disease processes/diagnoses(primary or secondary)? Other   Does the patient have Home health ordered? No   Is there a DME ordered? No   Prep survey completed? Yes            Leonie SALAZAR - Registered Nurse

## 2024-01-29 NOTE — CONSULTS
LOS: 3 days   Patient Care Team:  Margarita Woods APRN as PCP - General (Nurse Practitioner)  Winnie Sanchez MD as Referring Physician (Obstetrics and Gynecology)  Norberto Almaraz MD PhD as Consulting Physician (Hematology and Oncology)  Lupis Thomas MD as Consulting Physician (Nephrology)  Hair Mckeon MD as Consulting Physician (Nephrology)  Juana Taylor MD as Consulting Physician (Cardiology)    Chief Complaint: post op/abnormal CT scan    Subjective:  Symptoms:  No shortness of breath, cough or chest pain.    Diet:  Adequate intake.  No nausea or vomiting.    Activity level: Returning to normal.    Pain:  She reports no pain.      History of Present Illness  Patient is a nice 33 y.o female that is know our practice. She underwent ascending aortic dissection repair/proximal replacement, gacron interposition graft/ kelli procedure with Dr. Medina on 11/3/23 (see op note for details). She had a long/complex post-operative course. She was discharged to rehab and has been doing well, until last week. She presented to the ER on 1/26 with complaints of fever and abdominal pain. Work up consistent with pancreatitis. She has had a CT of the chest which showed band of  low-density material partially encircling the graft that may represent  hemorrhage or dissection and the combination of this low-density  material. We were asked to see the patient to weigh in on the abnormalities noted on the CT scan.    Vital Signs  Temp:  [98.6 °F (37 °C)-100 °F (37.8 °C)] 98.6 °F (37 °C)  Heart Rate:  [] 84  Resp:  [18-20] 18  BP: (103-118)/(76-80) 103/76  Body mass index is 18.6 kg/m².    Intake/Output Summary (Last 24 hours) at 1/29/2024 1049  Last data filed at 1/28/2024 2144  Gross per 24 hour   Intake 100 ml   Output --   Net 100 ml     No intake/output data recorded.          01/27/24  0524 01/29/24  0509   Weight: 45.8 kg (100 lb 14.4 oz) 49.2 kg (108 lb 6.4 oz)     Objective:  General Appearance:   "Comfortable and in no acute distress.    Vital signs: (most recent): Blood pressure 103/76, pulse 84, temperature 98.6 °F (37 °C), temperature source Oral, resp. rate 18, weight 49.2 kg (108 lb 6.4 oz), SpO2 100%, not currently breastfeeding.  Vital signs are normal.  No fever.    Output: Producing urine.    Lungs:  Normal effort and normal respiratory rate.    Heart: Normal rate.  Regular rhythm.    Abdomen: Abdomen is soft.  Bowel sounds are normal.     Extremities: There is no dependent edema.    Pulses: Distal pulses are intact.    Neurological: Patient is alert and oriented to person, place and time.    Skin:  Warm and dry.            Results Review:        WBC WBC   Date Value Ref Range Status   01/29/2024 4.87 3.40 - 10.80 10*3/mm3 Final   01/28/2024 5.62 3.40 - 10.80 10*3/mm3 Final   01/27/2024 6.21 3.40 - 10.80 10*3/mm3 Final   01/26/2024 5.45 3.40 - 10.80 10*3/mm3 Final      HGB Hemoglobin   Date Value Ref Range Status   01/29/2024 7.2 (L) 12.0 - 15.9 g/dL Final   01/28/2024 6.3 (C) 12.0 - 15.9 g/dL Final   01/27/2024 7.5 (L) 12.0 - 15.9 g/dL Final   01/26/2024 7.2 (L) 12.0 - 15.9 g/dL Final      HCT Hematocrit   Date Value Ref Range Status   01/29/2024 23.1 (L) 34.0 - 46.6 % Final   01/28/2024 20.4 (C) 34.0 - 46.6 % Final   01/27/2024 24.2 (L) 34.0 - 46.6 % Final   01/26/2024 23.0 (L) 34.0 - 46.6 % Final      Platelets Platelets   Date Value Ref Range Status   01/29/2024 137 (L) 140 - 450 10*3/mm3 Final   01/28/2024 160 140 - 450 10*3/mm3 Final   01/27/2024 173 140 - 450 10*3/mm3 Final   01/26/2024 168 140 - 450 10*3/mm3 Final        PT/INR:  No results found for: \"PROTIME\"/No results found for: \"INR\"    Sodium Sodium   Date Value Ref Range Status   01/29/2024 133 (L) 136 - 145 mmol/L Final   01/28/2024 133 (L) 136 - 145 mmol/L Final   01/27/2024 136 136 - 145 mmol/L Final   01/26/2024 135 (L) 136 - 145 mmol/L Final      Potassium Potassium   Date Value Ref Range Status   01/29/2024 3.6 3.5 - 5.2 mmol/L " "Final   01/28/2024 4.8 3.5 - 5.2 mmol/L Final   01/27/2024 4.1 3.5 - 5.2 mmol/L Final   01/26/2024 4.0 3.5 - 5.2 mmol/L Final     Comment:     Slight hemolysis detected by analyzer. Result may be falsely elevated.      Chloride Chloride   Date Value Ref Range Status   01/29/2024 100 98 - 107 mmol/L Final   01/28/2024 97 (L) 98 - 107 mmol/L Final   01/27/2024 100 98 - 107 mmol/L Final   01/26/2024 98 98 - 107 mmol/L Final      Bicarbonate CO2   Date Value Ref Range Status   01/29/2024 23.0 22.0 - 29.0 mmol/L Final   01/28/2024 22.0 22.0 - 29.0 mmol/L Final   01/27/2024 25.0 22.0 - 29.0 mmol/L Final   01/26/2024 27.4 22.0 - 29.0 mmol/L Final      BUN BUN   Date Value Ref Range Status   01/29/2024 12 6 - 20 mg/dL Final   01/28/2024 35 (H) 6 - 20 mg/dL Final   01/27/2024 19 6 - 20 mg/dL Final   01/26/2024 17 6 - 20 mg/dL Final      Creatinine Creatinine   Date Value Ref Range Status   01/29/2024 4.53 (H) 0.57 - 1.00 mg/dL Final   01/28/2024 7.96 (H) 0.57 - 1.00 mg/dL Final   01/27/2024 6.45 (H) 0.57 - 1.00 mg/dL Final   01/26/2024 6.38 (H) 0.57 - 1.00 mg/dL Final      Calcium Calcium   Date Value Ref Range Status   01/29/2024 7.7 (L) 8.6 - 10.5 mg/dL Final   01/28/2024 7.8 (L) 8.6 - 10.5 mg/dL Final   01/27/2024 8.5 (L) 8.6 - 10.5 mg/dL Final   01/26/2024 8.8 8.6 - 10.5 mg/dL Final      Magnesium No results found for: \"MG\"       carvedilol, 25 mg, Oral, Q12H  escitalopram, 10 mg, Oral, Daily  folic acid, 1 mg, Oral, Daily  hydroxychloroquine, 200 mg, Oral, Daily  lacosamide, 100 mg, Oral, Q12H  lisinopril, 10 mg, Oral, Q24H         Assessment & Plan  - acute pancreatitis  - Ascending aortic aneurysm with dissection- s/p ascending aortic dissection repair/proximal replacement, gacron interposition graft, kelli procedure; insertion of right femoral shiley---Dr. Medina 11/3/23  - Cardiac tamponade- reexploration for bleeding, removal of large clot compressing the RV--Dr. Lowe 11/6/23  - severe aortic valve " insufficiency  - Encephalopathy, improving   - ESRD on HD  - Lupus--Cellcept/plaquenil   - Epilepsy  - hypertension  - chronic anemia  - TCP--chronic  - Depression  - right MCA CVA--continue ASA per neuro    Dr. Medina has reviewed the CT scan. No intervention necessary. Patient to follow up with him in the office on 4/9/24 at 11am with a repeat CT  Questions answered with verbalized understanding    Katherine Caal, APRN  01/29/24  10:49 EST    Addendum  Patient was seen and examined by me, agree with the findings above.  Overall she is doing much better than from previous admission.  She is recovering, she is gaining weight and she is interactive.  He has a low case of pancreatitis.  The scan shows some clot around the graft.  See a need for further investigation now but I will see her in my office in 3 months with a repeat CTA.  Also I think she should be evaluated for an AV fistula to avoid tunneled catheter infection and the risk of aortic valve endocarditis  Chris Medina MD

## 2024-01-29 NOTE — PROGRESS NOTES
ID note    Chief complaint: Follow-up fever/peritonitis/pancreatitis     Subjective: No acute events.  No fever.  Tolerating and advance diet.  Eager for discharge.  Denies foot pain.      Medications:    Current Facility-Administered Medications:     acetaminophen (TYLENOL) tablet 650 mg, 650 mg, Oral, Q4H PRN, Dariusz Lang APRN, 650 mg at 01/28/24 0222    aluminum-magnesium hydroxide-simethicone (MAALOX MAX) 400-400-40 MG/5ML suspension 15 mL, 15 mL, Oral, Q6H PRN, Dariusz Lang APRN    carvedilol (COREG) tablet 25 mg, 25 mg, Oral, Q12H, Stephen Castillo MD, 25 mg at 01/29/24 0945    escitalopram (LEXAPRO) tablet 10 mg, 10 mg, Oral, Daily, Stephen Castillo MD, 10 mg at 01/29/24 0945    folic acid (FOLVITE) tablet 1 mg, 1 mg, Oral, Daily, Stephen Castillo MD, 1 mg at 01/29/24 0945    HYDROmorphone (DILAUDID) injection 0.5 mg, 0.5 mg, Intravenous, Q4H PRN, Lakisha Hunt MD    hydroxychloroquine (PLAQUENIL) tablet 200 mg, 200 mg, Oral, Daily, Stephen Castillo MD, 200 mg at 01/29/24 0945    labetalol (NORMODYNE,TRANDATE) injection 20 mg, 20 mg, Intravenous, Q2H PRN, Stephen Castillo MD    lacosamide (VIMPAT) tablet 100 mg, 100 mg, Oral, Q12H, Stephen Castillo MD, 100 mg at 01/29/24 0945    lisinopril (PRINIVIL,ZESTRIL) tablet 10 mg, 10 mg, Oral, Q24H, Stephen Castillo MD, 10 mg at 01/29/24 0945    nitroglycerin (NITROSTAT) SL tablet 0.4 mg, 0.4 mg, Sublingual, Q5 Min PRN, Dariusz Lang APRN    ondansetron ODT (ZOFRAN-ODT) disintegrating tablet 4 mg, 4 mg, Oral, Q6H PRN **OR** ondansetron (ZOFRAN) injection 4 mg, 4 mg, Intravenous, Q6H PRN, Dariusz Lang APRN    oxyCODONE (ROXICODONE) immediate release tablet 5 mg, 5 mg, Oral, Q6H PRN, Stephen Castillo MD, 5 mg at 01/28/24 2146    [COMPLETED] Insert Peripheral IV, , , Once **AND** sodium chloride 0.9 % flush 10 mL, 10 mL, Intravenous, PRN, Johnathan Hughes,  MD    sodium chloride 0.9 % flush 10 mL, 10 mL, Intravenous, PRN, Dariusz Lang, APRN      Objective   Vital Signs   Temp:  [98.6 °F (37 °C)-100 °F (37.8 °C)] 98.6 °F (37 °C)  Heart Rate:  [] 84  Resp:  [18-20] 18  BP: (103-118)/(76-80) 103/76    Physical Exam:   General: awake, alert, very nice  Eyes: no scleral icterus  Cardiovascular: NR  Respiratory: normal work of breathing.  On room air  GI: Abdomen is not distended  :  no Stark catheter  Skin: No rashes  Neurological: Alert and oriented x 3  Psychiatric: Normal mood and affect   Vasc: L chest HD catheter w/o erythema    Labs:   CBC, CMP, lipase, and blood cultures reviewed today  Lab Results   Component Value Date    WBC 4.87 01/29/2024    HGB 7.2 (L) 01/29/2024    HCT 23.1 (L) 01/29/2024    MCV 80.2 01/29/2024     (L) 01/29/2024       Lab Results   Component Value Date    GLUCOSE 87 01/29/2024    BUN 12 01/29/2024    CREATININE 4.53 (H) 01/29/2024    EGFRIFAFRI 8 (L) 03/20/2023    BCR 2.6 (L) 01/29/2024    CO2 23.0 01/29/2024    CALCIUM 7.7 (L) 01/29/2024    PROTENTOTREF 5.4 (L) 12/09/2022    ALBUMIN 2.0 (L) 01/29/2024    LABIL2 1.3 03/20/2023    AST 8 01/29/2024    ALT <5 01/29/2024     Lab Results   Component Value Date    LIPASE 416 (H) 01/29/2024     Lab Results   Component Value Date    URICACID 3.6 01/26/2024     hCG qualitative: Negative  Procalcitonin 0.9    Microbiology:  1/26/2024 RPP: Negative  1/27/2024  BCx: Negative to date    ASSESSMENT/PLAN:  Acute pancreatitis with peripancreatic fluid collection  Recent aortic dissection status postsurgical repair  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  Subjectively she feels better though her lipase has increased today.  She is afebrile and her blood cultures are negative.  I recommend stopping Zosyn as antibiotics are not typically indicated for  pancreatitis.  I kept her on antibiotics initially to make sure she did not have bacteremia.    Thank you for allowing me to be involved in the care of this patient. Infectious diseases will sign off at this time with antibiotics plan in place, but please call me at [phone] if any further ID questions or new ID concerns.

## 2024-01-29 NOTE — DISCHARGE SUMMARY
Patient Name: Dee Herrera  : 1990  MRN: [id number]    Date of Admission: 2024  Date of Discharge:  2024  Primary Care Physician: Margarita Woods, APRN      Discharge Diagnoses     Active Hospital Problems    Diagnosis  POA    **Acute pancreatitis [K85.90]  Yes    Thoracic ascending aortic aneurysm [I71.21]  Yes    ESRD (end stage renal disease) [N18.6]  Yes    Chronic diastolic CHF (congestive heart failure) [I50.32]  Yes    Essential hypertension [I10]  Yes    Seizure disorder [G40.909]  Yes    Systemic lupus erythematosus [M32.9]  Yes    Iron deficiency anemia [D50.9]  Yes      Resolved Hospital Problems    Diagnosis Date Resolved POA    Fever [R50.9] 2024 Yes    Sepsis [A41.9] 2024 No        Hospital Course     Brief admission history and physical please refer to the H&P for full details.  A 33 years old female with a past history of lupus nephritis leading to end-stage renal disease on hemodialysis/hypertension/aortic valve replacement/recent repair of a dissecting ascending thoracic aneurysm/seizures who presented to the emergency department with abdominal pain/nausea/vomiting/fever and chills/atypical chest pain/right foot pain and swelling.  Physical examination remarkable for a Tmax of 102 a pulse of 100 to respiratory rate of 22 blood pressure 139/103.  Rest of the examination is remarkable for chronically ill-appearing female/grade 2 systolic murmur/diffuse abdominal tenderness and guarding/edema and tenderness in the right foot  Hospital course.  Initial ER evaluation included a CMP that was normal except a creatinine of 6.38, sodium 135, albumin 2.7, GFR 8.4.  Lipase was 295.  hCG negative.  CBC normal except hemoglobin of 7.2.  CRP was elevated at 10.5.  ESR elevated at 48.  Uric acid is normal.  Respiratory PCR panel is negative.  CT abdomen and pelvis without contrast revealed pancreatitis with peripancreatic fluid collection and mild diffuse cystitis with small  left pleural effusion and cardiomegaly and previous chest herniotomy and retrosternal soft tissue thickening particularly in the aortocaval region related to reactive adenopathy.  Right foot x-ray with soft tissue swelling.  Impression this lady was that of abdominal pain/nausea/vomiting secondary to pancreatitis and probably peritonitis.  She does not consume alcohol.  Liver function test was normal.  Lipase was elevated.  She was placed n.p.o. and started empirically on Zosyn for the possibility of peritonitis.  Right upper quadrant ultrasound revealed no gallbladder abnormalities.  Triglycerides were normal.  There was no incriminating drug causing her pancreatitis.  Etiology of the pancreatitis was unknown.  GI followed up with the patient and okayed the discharge and we will refer her to GI as outpatient.  Her other issue was fever and procalcitonin was elevated.  She was empirically started on IV Zosyn for the possibility of infected intra-abdominal fluid.  Cellulitis of the right lower extremity was also entertained.  Her white count remained normal.  Blood cultures were negative by the time of discharge.  Respiratory PCR was negative.  Her dialysis catheter site was healthy.  CT scan of the chest was as mentioned above.  Infectious disease consulted and eventual diagnosis of her fever was reactive secondary to pancreatitis.  Antibiotic were DC'd.  Diet was slowly advanced and she tolerated regular diet at the time of discharge.  She has a history of lupus nephritis with end-stage renal disease on hemodialysis TTS and nephrology followed up with the patient performed dialysis.  Her Plaquenil was resumed at the time of discharge.  She has a history of hypertension status post recent prosthetic aortic valve placement and dissecting ascending aortic aneurysm repair.  Her blood pressure was on the low side and her Norvasc was DC'd and she was maintained on lisinopril and Coreg and her blood pressure remained  stable.  No evidence of angina or congestive heart failure cardiothoracic saw the patient and believe that the CT changes are postoperative and does not recommend any surgical intervention.  Her blood pressure was on the low side and Norvasc was DC'd anemia is secondary to iron deficiency/folate deficiency/anemia of chronic disease and she was transfused during dialysis during this admission and her hemoglobin has improved.  For her seizure disorder Vimpat was maintained and she did not experience any seizures during this admission.  ID did not think that the patient has right foot cellulitis and had x-ray of the right foot revealed only swelling that has improved.  At the time of discharge she was tolerating regular diet well with decrease in the abdominal pain even though her lipase was mildly elevated after the diet advancement.  Patient was hemodynamically stable.  Consultants     Consult Orders (all) (From admission, onward)       Start     Ordered    01/29/24 1036  Inpatient Thoracic Surgery Consult  Once        Provider:  Chris Medina MD    01/29/24 1035    01/29/24 1022  Inpatient Thoracic Surgery Consult  Once,   Status:  Canceled        Specialty:  Thoracic Surgery  Provider:  Jerrod Madrid MD PhD    01/29/24 1026    01/27/24 1119  Inpatient Vascular Surgery Consult  Once,   Status:  Canceled        Specialty:  Vascular Surgery  Provider:  (Not yet assigned)    01/27/24 1119    01/27/24 1118  Inpatient Infectious Diseases Consult  Once        Specialty:  Infectious Diseases  Provider:  Brain Epstein MD    01/27/24 1119    01/27/24 0702  Inpatient Nephrology Consult  IN         Specialty:  Nephrology  Provider:  Hair Mckeon MD    01/26/24 2007 01/26/24 2127  Inpatient Gastroenterology Consult  Once        Specialty:  Gastroenterology  Provider:  Debra Olivares MD    01/26/24 2128 01/26/24 1908  [facility] (on-call MD unless specified) Details  Once        Specialty:   Hospitalist  Provider:  Stephen Castillo MD    01/26/24 1907                  Procedures     Imaging Results (All)       Procedure Component Value Units Date/Time    CT Chest Without Contrast Diagnostic [[id number]] Collected: 01/29/24 1001     Updated: 01/29/24 1015    Narrative:      CT CHEST WITHOUT CONTRAST     HISTORY: On 11/02/2023 patient underwent aortic root replacement with  valve sparing and replacement of the ascending aorta. Sternal  exploration and washout on 11/06/2023.     TECHNIQUE:  CT chest includes axial imaging from the thoracic inlet to  the upper abdomen without IV contrast. Date reconstructed in coronal and  sagittal planes. Radiation dose reduction techniques were utilized,  including automated exposure control and exposure modulation based on  body size.     COMPARISON: CT chest 10/30/2023.     FINDINGS: There has been repair of the aortic root and ascending  thoracic aorta. There is an ascending aortic graft. There is  circumferential low density surrounding the anterior, posterior, right  lateral aspect of the ascending thoracic aorta measuring 1.5 cm in  thickness that may represent hemorrhage or dissection. This low-density  material in combination with the graft measures 5.5 cm in AP dimension.  This low-density material surrounding the ascending thoracic aorta  tapers to the level of the top of the aortic arch and is not present at  the level of top of the aortic arch and does not surround the ascending  thoracic aorta.     The heart size is enlarged. Left hemodialysis catheter tip extends into  the right atrium. Small bilateral pleural effusions are present and  there is left basilar atelectasis with potential mild left basilar  infiltrate.     Imaging through the upper abdomen demonstrates findings similar to CT  abdomen 3 days ago.     There is ununited median sternotomy.       Impression:      1. There has been repair of the aortic root and ascending thoracic aorta  with  placement of a 26 mm Dacron interposition graft with a  valve-sparing procedure. There is a 1.5 cm in thickness band of  low-density material partially encircling the graft that may represent  hemorrhage or dissection and the combination of this low-density  material and the graft of the ascending thoracic aorta measures 5.5 cm  in diameter. CT angiogram chest could be performed to evaluate if  contrast extends into this low-density area surrounding the graft.   2. Cardiomegaly. Ununited median sternotomy.  3. Small bilateral pleural effusions. Mild basilar atelectasis with  potential mild left lower lobe infiltrate.         Findings discussed with nurse Elaina working with Dr. Medina on  1/29/2024 at 9:30 a.m.     Radiation dose reduction techniques were utilized, including automated  exposure control and exposure modulation based on body size.        This report was finalized on 1/29/2024 10:12 AM by Dr. Adolph Londono M.D on Workstation: [id number]        Abdomen Limited [[id number]] Collected: 01/27/24 1404     Updated: 01/27/24 1526    Narrative:      RIGHT UPPER QUADRANT SONOGRAM     HISTORY: Abdominal pain with nausea and vomiting.     COMPARISON: CT abdomen and pelvis 1/26/2024.     FINDINGS: Liver measures 15.2 cm in craniocaudal dimension. Gallbladder  appears within normal limits. There is mild ascites with perihepatic  fluid.     Right kidney measures 6.7 cm which is atrophic. There is no  hydronephrosis. There is no evidence for intrahepatic or extrahepatic  biliary ductal dilatation. Pancreas is not well visualized..       Impression:      Mild ascites with perihepatic fluid. No evidence for  cholelithiasis or cholecystitis. Pancreas is not well visualized due to  overlapping bowel gas.     This report was finalized on 1/27/2024 3:23 PM by Dr. Adolph Londono M.D on Workstation: [id number]        Paracentesis [[id number]] Collected: 01/27/24 1001    Specimen: Body Fluid Updated: 01/27/24 1005     Narrative:      US Limited abdomen-     INDICATIONS: Abdominal ascites     TECHNIQUE: LIMITED ABDOMINAL ULTRASOUND     COMPARISON: None available     FINDINGS:     Grayscale ultrasound was used to assess for presence of abdominal  ascites. A small amount of abdominal ascites was present, but felt to be  insufficient for paracentesis at this time. No paracentesis was  performed.       Impression:         As described.           This report was finalized on 1/27/2024 10:02 AM by Dr. Norm Arshad M.D on Workstation: [id number]       CT Abdomen Pelvis Without Contrast [[id number]] Collected: 01/26/24 1904     Updated: 01/26/24 2059    Narrative:      CT ABDOMEN AND PELVIS WITHOUT IV CONTRAST     HISTORY: Abdominal pain with nausea and vomiting     TECHNIQUE:  CT includes axial imaging from the lung bases to the  trochanters without intravenous contrast and without use of oral  contrast. Data reconstructed in coronal and sagittal planes. Radiation  dose reduction techniques were utilized, including automated exposure  control and exposure modulation based on body size.     COMPARISON: CT abdomen and pelvis 02/14/2023     FINDINGS: Small left pleural effusion layers dependently. The heart size  is enlarged. There has been previous median sternotomy which is not yet  united.     There is moderate intra-abdominal ascites. Perihepatic and perisplenic  fluid are present. Generalized mesenteric edema and stranding are  present. The presence of ascites and mesenteric stranding and edema  limits evaluation for focal inflammatory process. Stomach is mostly  decompressed.     There appears to be partly loculated fluid extending along the left  lateral margin of the stomach measuring up to 19 mm in thickness and  extending along the greater curvature. There is also soft tissue  thickening surrounding the pancreatic tail with potential small  collections or developing collections. Edema and soft tissue thickening  surrounds  the left adrenal gland which also appears thickened. The right  adrenal gland appears normal.     There is no bowel dilatation or evidence for bowel obstruction. There is  no evidence for free intraperitoneal air.     There is soft tissue thickening in the retroperitoneum particularly  extending between the abdominal aorta and IVC suspected to represent  antolin enlargement and this is most likely reactive though evaluation is  limited without IV contrast.       Impression:      :  1. Abnormal thickening of the distal pancreatic body and tail consistent  with pancreatitis. There appear to be pancreatic/peripancreatic  collections that extend superiorly adjacent to the stomach and measuring  up to 2 cm in thickness.  2. Mild diffuse ascites and mesenteric stranding. No evidence for bowel  obstruction.  3. Small left pleural effusion layers dependently.  4. Cardiomegaly. Previous median sternotomy.  5. Retroperitoneal soft tissue thickening particularly in the aortocaval  region most likely related to reactive adenopathy though recommend  follow-up with contrasted CT if possible.     Radiation dose reduction techniques were utilized, including automated  exposure control and exposure modulation based on body size.        This report was finalized on 1/26/2024 8:56 PM by Dr. Adolph Londono M.D on Workstation: [id number]       XR Foot 3+ View Right [[id number]] Collected: 01/26/24 1651     Updated: 01/26/24 1655    Narrative:      XR FOOT 3+ VW RIGHT-1/26/2024     HISTORY: Right foot pain and swelling.     There is moderate soft tissue swelling of the right foot. No fractures  or other acute bony abnormalities are seen. No abnormal air collections  are seen.       Impression:      1. Soft tissue swelling of the right foot.  2. No other significant findings are noted.        This report was finalized on 1/26/2024 4:52 PM by Dr. Clyde Rayo M.D on Workstation: [id number]               Pertinent Labs     Results from  last 7 days   Lab Units 01/29/24 0413 01/28/24 0408 01/27/24 0018 01/26/24  1654   WBC 10*3/mm3 4.87 5.62 6.21 5.45   HEMOGLOBIN g/dL 7.2* 6.3* 7.5* 7.2*   PLATELETS 10*3/mm3 137* 160 173 168     Results from last 7 days   Lab Units 01/29/24 0413 01/28/24 0408 01/27/24 0018 01/26/24  1654   SODIUM mmol/L 133* 133* 136 135*   POTASSIUM mmol/L 3.6 4.8 4.1 4.0   CHLORIDE mmol/L 100 97* 100 98   CO2 mmol/L 23.0 22.0 25.0 27.4   BUN mg/dL 12 35* 19 17   CREATININE mg/dL 4.53* 7.96* 6.45* 6.38*   GLUCOSE mg/dL 87 82 89 89   Estimated Creatinine Clearance: 14 mL/min (A) (by C-G formula based on SCr of 4.53 mg/dL (H)).  Results from last 7 days   Lab Units 01/29/24 0413 01/28/24 0408 01/27/24 0018 01/26/24  1654   ALBUMIN g/dL 2.0* 2.3* 2.3* 2.6*   BILIRUBIN mg/dL 0.3 0.5  --  0.4   ALK PHOS U/L 54 57  --  65   AST (SGOT) U/L 8 10  --  13   ALT (SGPT) U/L <5 <5  --  7     Results from last 7 days   Lab Units 01/29/24 0413 01/28/24 0408 01/27/24 0018 01/26/24  1654 01/23/24  1552   CALCIUM mg/dL 7.7* 7.8* 8.5* 8.8  --    ALBUMIN g/dL 2.0* 2.3* 2.3* 2.6*  --    MAGNESIUM mg/dL  --   --   --   --  1.9   PHOSPHORUS mg/dL  --   --  4.1  --   --      Results from last 7 days   Lab Units 01/29/24 0413 01/28/24 0408 01/27/24 0018 01/26/24  1654   LIPASE U/L 416* 194* 189* 295*     Results from last 7 days   Lab Units 01/28/24  0408   D DIMER QUANT MCGFEU/mL 12.59*     Results from last 7 days   Lab Units 01/26/24  1654   URIC ACID mg/dL 3.6     Results from last 7 days   Lab Units 01/28/24  0408   CHOLESTEROL mg/dL 55   TRIGLYCERIDES mg/dL 103   HDL CHOL mg/dL 12*   LDL CHOL mg/dL 23     Results from last 7 days   Lab Units 01/27/24  0018   BLOODCX  No growth at 2 days  No growth at 2 days     Imaging Results (Last 24 Hours)       Procedure Component Value Units Date/Time    CT Chest Without Contrast Diagnostic [[id number]] Collected: 01/29/24 1001     Updated: 01/29/24 1015    Narrative:      CT CHEST WITHOUT  CONTRAST     HISTORY: On 11/02/2023 patient underwent aortic root replacement with  valve sparing and replacement of the ascending aorta. Sternal  exploration and washout on 11/06/2023.     TECHNIQUE:  CT chest includes axial imaging from the thoracic inlet to  the upper abdomen without IV contrast. Date reconstructed in coronal and  sagittal planes. Radiation dose reduction techniques were utilized,  including automated exposure control and exposure modulation based on  body size.     COMPARISON: CT chest 10/30/2023.     FINDINGS: There has been repair of the aortic root and ascending  thoracic aorta. There is an ascending aortic graft. There is  circumferential low density surrounding the anterior, posterior, right  lateral aspect of the ascending thoracic aorta measuring 1.5 cm in  thickness that may represent hemorrhage or dissection. This low-density  material in combination with the graft measures 5.5 cm in AP dimension.  This low-density material surrounding the ascending thoracic aorta  tapers to the level of the top of the aortic arch and is not present at  the level of top of the aortic arch and does not surround the ascending  thoracic aorta.     The heart size is enlarged. Left hemodialysis catheter tip extends into  the right atrium. Small bilateral pleural effusions are present and  there is left basilar atelectasis with potential mild left basilar  infiltrate.     Imaging through the upper abdomen demonstrates findings similar to CT  abdomen 3 days ago.     There is ununited median sternotomy.       Impression:      1. There has been repair of the aortic root and ascending thoracic aorta  with placement of a 26 mm Dacron interposition graft with a  valve-sparing procedure. There is a 1.5 cm in thickness band of  low-density material partially encircling the graft that may represent  hemorrhage or dissection and the combination of this low-density  material and the graft of the ascending thoracic aorta  measures 5.5 cm  in diameter. CT angiogram chest could be performed to evaluate if  contrast extends into this low-density area surrounding the graft.   2. Cardiomegaly. Ununited median sternotomy.  3. Small bilateral pleural effusions. Mild basilar atelectasis with  potential mild left lower lobe infiltrate.         Findings discussed with nurse Elaina working with Dr. Medina on  1/29/2024 at 9:30 a.m.     Radiation dose reduction techniques were utilized, including automated  exposure control and exposure modulation based on body size.        This report was finalized on 1/29/2024 10:12 AM by Dr. Adolph Londono M.D on Workstation: [id number]               Test Results Pending at Discharge     Pending Labs       Order Current Status    Blood Culture - Blood, Arm, Left Preliminary result    Blood Culture - Blood, Hand, Left Preliminary result              Discharge Exam   Physical Exam  Vitals.  Temperature 98.6 a pulse of 84 respirate rate of 18 blood pressure 103/76 and O2 sats of 100% on room air.  General.  Middle-aged female.  She is alert and oriented x 4.  No acute pain/distress/diaphoresis.  Normal mood and affect.    Eyes.  Pupils equal round and reactive.  Intact extraocular musculature.  No pallor or jaundice.  Oral cavity.  Moist mucous membrane.  Neck.  Supple.  No JVD.  No lymphadenopathy or thyromegaly.  Cardiovascular.  Regular rate and rhythm and grade 2 systolic murmur.  Chest.  Dialysis catheter on the left upper chest with healthy site.  Poor but clear to auscultation with no added sounds.  Abdomen.  Mild diffuse tenderness.  No localized tenderness.  Normal bowel sounds.  No organomegaly.  No guarding or rebound.  Extremities.  No clubbing or cyanosis.  Resolved right foot edema and tenderness..    CNS.  No acute focal neurological deficits.  Discharge Details        Discharge Medications        Continue These Medications        Instructions Start Date   aspirin 81 MG chewable tablet   81  mg, Oral, Daily      carvedilol 25 MG tablet  Commonly known as: COREG   25 mg, Oral, Every 12 Hours Scheduled      escitalopram 10 MG tablet  Commonly known as: LEXAPRO   10 mg, Oral, Daily      folic acid 1 MG tablet  Commonly known as: FOLVITE   1 mg, Oral, Daily      hydroxychloroquine 200 MG tablet  Commonly known as: PLAQUENIL   200 mg, Oral, Daily      lacosamide 100 MG tablet tablet  Commonly known as: VIMPAT   100 mg, Oral, Every 12 Hours Scheduled      lisinopril 10 MG tablet  Commonly known as: PRINIVIL,ZESTRIL   10 mg, Oral, Every 24 Hours Scheduled      mycophenolate 500 MG tablet  Commonly known as: CELLCEPT   250 mg, Oral, Every 12 Hours Scheduled      ondansetron 4 MG tablet  Commonly known as: Zofran   4 mg, Oral, Every 8 Hours PRN      oxyCODONE 5 MG immediate release tablet  Commonly known as: ROXICODONE   5 mg, Oral, Every 6 Hours PRN      polyethylene glycol 17 GM/SCOOP powder  Commonly known as: MIRALAX   17 g, Oral, As Needed             Stop These Medications      amLODIPine 10 MG tablet  Commonly known as: NORVASC              Allergies   Allergen Reactions    Minoxidil Hives and Other (See Comments)     Pericardial effusion .         Discharge Disposition:  Condition:     Diet:   Diet Order   Procedures    Diet: Gastrointestinal Diets; Fat-Restricted; Texture: Regular Texture (IDDSI 7); Fluid Consistency: Thin (IDDSI 0)       Activity:   Activity Instructions       Activity as Tolerated              Counseling :    CODE STATUS:    Code Status and Medical Interventions:   Ordered at: 01/26/24 2006     Code Status (Patient has no pulse and is not breathing):    CPR (Attempt to Resuscitate)     Medical Interventions (Patient has pulse or is breathing):    Full Support       Future Appointments   Date Time Provider Department Center   1/31/2024 11:00 AM Shereen Sharma APRN [id number] None   2/12/2024  1:00 PM Margarita Woods APRN [facility]   4/9/2024 11:00 AM Chris Medina MD  [facility]     Additional Instructions for the Follow-ups that You Need to Schedule       Call MD With Problems / Concerns   As directed      Instructions: Call MD or return to ER if increasing abdominal pain/nausea or vomiting/bowel habit changes/fever or chills/chest pain/shortness of breath    Order Comments: Instructions: Call MD or return to ER if increasing abdominal pain/nausea or vomiting/bowel habit changes/fever or chills/chest pain/shortness of breath         Discharge Follow-up with PCP   As directed       Currently Documented PCP:    Margarita Woods APRN    PCP Phone Number:    [phone]     Follow Up Details: Primary MD.  1 week.  Acute pancreatitis/peritonitis/fever/lupus nephritis/end-stage renal disease/hypertension/aortic valve replacement/ascending thoracic aneurysm s/p repair/seizures/right foot pain        Discharge Follow-up with Specified Provider: Cardiothoracic surgery as scheduled on 4/9/2024   As directed      To: Cardiothoracic surgery as scheduled on 4/9/2024   Follow Up Details: Ascending thoracic aneurysm dissection status postrepair        Discharge Follow-up with Specified Provider: GI; 2 Weeks   As directed      To: GI   Follow Up: 2 Weeks   Follow Up Details: Pancreatitis/peritonitis        Discharge Follow-up with Specified Provider: Nephrology.   As directed      To: Nephrology.   Follow Up Details: End-stage renal disease.  Hemodialysis Tuesday/Thursday/Saturday               Follow-up Information       Margarita Woods APRN .    Specialties: Nurse Practitioner, Internal Medicine, Family Medicine  Why: Primary MD.  1 week.  Acute pancreatitis/peritonitis/fever/lupus nephritis/end-stage renal disease/hypertension/aortic valve replacement/ascending thoracic aneurysm s/p repair/seizures/right foot pain  Contact information:  [address] [place] [id number]  [phone]                               Time Spent on Discharge:  Greater than 30 minutes      Samer H  MD Gilbert  [facility]  01/29/24  13:56 EST

## 2024-01-29 NOTE — PROGRESS NOTES
Name: Dee Herrera ADMIT: 2024   : 1990  PCP: Margarita Woods APRN    MRN: [id number] LOS: 3 days   AGE/SEX: 33 y.o. female  ROOM: [facility]     Subjective   Subjective   Resolved abdominal pain.  No nausea or vomiting.  No fever or chills.  Normal bowel movement  without fresh bright blood per rectum or melena.  Improved with right foot pain and swelling.    Review of Systems  Cardiovascular/respiratory.  No cough resolved left-sided chest pain.  No shortness of breath.  No hemoptysis.  No ankle edema.  GI.  As above.  .  Minimal urine output with no dysuria.     Objective   Objective   Vital Signs  Temp:  [98.6 °F (37 °C)-100 °F (37.8 °C)] 98.6 °F (37 °C)  Heart Rate:  [] 84  Resp:  [18-20] 18  BP: (103-118)/(76-80) 103/76  SpO2:  [99 %-100 %] 100 %  on   ;   Device (Oxygen Therapy): room air    Intake/Output Summary (Last 24 hours) at 2024 1027  Last data filed at 2024 2144  Gross per 24 hour   Intake 100 ml   Output --   Net 100 ml     Body mass index is 18.6 kg/m².      24  0524 24  0509   Weight: 45.8 kg (100 lb 14.4 oz) 49.2 kg (108 lb 6.4 oz)     Physical Exam  General.  Middle-aged female.  She is alert and oriented x 4.  No acute pain/distress/diaphoresis.  Normal mood and affect.    Eyes.  Pupils equal round and reactive.  Intact extraocular musculature.  No pallor or jaundice.  Oral cavity.  Moist mucous membrane.  Neck.  Supple.  No JVD.  No lymphadenopathy or thyromegaly.  Cardiovascular.  Regular rate and rhythm and grade 2 systolic murmur.  Chest.  Dialysis catheter on the left upper chest with healthy site.  Poor but clear to auscultation with no added sounds.  Abdomen.  Mild diffuse tenderness.  No localized tenderness.  Normal bowel sounds.  No organomegaly.  No guarding or rebound.  Extremities.  No clubbing or cyanosis.  Resolved right foot edema and tenderness..    CNS.  No acute focal neurological deficits.          Results Review:      Results  from last 7 days   Lab Units 01/29/24  0413 01/28/24  0408 01/27/24  0018 01/26/24  1654   SODIUM mmol/L 133* 133* 136 135*   POTASSIUM mmol/L 3.6 4.8 4.1 4.0   CHLORIDE mmol/L 100 97* 100 98   CO2 mmol/L 23.0 22.0 25.0 27.4   BUN mg/dL 12 35* 19 17   CREATININE mg/dL 4.53* 7.96* 6.45* 6.38*   GLUCOSE mg/dL 87 82 89 89   CALCIUM mg/dL 7.7* 7.8* 8.5* 8.8   AST (SGOT) U/L 8 10  --  13   ALT (SGPT) U/L <5 <5  --  7     Estimated Creatinine Clearance: 14 mL/min (A) (by C-G formula based on SCr of 4.53 mg/dL (H)).                      Results from last 7 days   Lab Units 01/27/24  0018 01/23/24  1552   MAGNESIUM mg/dL  --  1.9   PHOSPHORUS mg/dL 4.1  --      Results from last 7 days   Lab Units 01/28/24  0408   CHOLESTEROL mg/dL 55   TRIGLYCERIDES mg/dL 103   HDL CHOL mg/dL 12*     Results from last 7 days   Lab Units 01/29/24 0413 01/28/24  0408 01/27/24  0018 01/26/24  1654   WBC 10*3/mm3 4.87 5.62 6.21 5.45   HEMOGLOBIN g/dL 7.2* 6.3* 7.5* 7.2*   HEMATOCRIT % 23.1* 20.4* 24.2* 23.0*   PLATELETS 10*3/mm3 137* 160 173 168   MCV fL 80.2 79.4 82.9 82.1   MCH pg 25.0* 24.5* 25.7* 25.7*   MCHC g/dL 31.2* 30.9* 31.0* 31.3*   RDW % 15.7* 16.3* 15.2 15.5*   RDW-SD fl 45.5 46.8 44.8 45.8   MPV fL 11.9 10.6 10.2 9.6   NEUTROPHIL % %  --  72.1 75.8 72.8   LYMPHOCYTE % %  --  11.0* 8.2* 10.6*   MONOCYTES % %  --  14.9* 15.0* 14.3*   EOSINOPHIL % %  --  0.9 0.5 1.7   BASOPHIL % %  --  0.4 0.2 0.2   IMM GRAN % %  --  0.7* 0.3 0.4   NEUTROS ABS 10*3/mm3  --  4.05 4.71 3.97   LYMPHS ABS 10*3/mm3  --  0.62* 0.51* 0.58*   MONOS ABS 10*3/mm3  --  0.84 0.93* 0.78   EOS ABS 10*3/mm3  --  0.05 0.03 0.09   BASOS ABS 10*3/mm3  --  0.02 0.01 0.01   IMMATURE GRANS (ABS) 10*3/mm3  --  0.04 0.02 0.02   NRBC /100 WBC  --  0.0 0.0 0.0             Results from last 7 days   Lab Units 01/28/24  0408 01/27/24  0018   PROCALCITONIN ng/mL 0.95*  --    LACTATE mmol/L  --  0.6     Results from last 7 days   Lab Units 01/26/24  1654   SED RATE mm/hr 48*    CRP mg/dL 10.55*     Results from last 7 days   Lab Units 01/29/24  0413 01/28/24  0408 01/27/24  0018 01/26/24  1654   LIPASE U/L 416* 194* 189* 295*     Results from last 7 days   Lab Units 01/27/24  0018   BLOODCX  No growth at 2 days  No growth at 2 days     Results from last 7 days   Lab Units 01/26/24  2151   ADENOVIRUS DETECTION BY PCR  Not Detected   CORONAVIRUS 229E  Not Detected   CORONAVIRUS HKU1  Not Detected   CORONAVIRUS NL63  Not Detected   CORONAVIRUS OC43  Not Detected   HUMAN METAPNEUMOVIRUS  Not Detected   HUMAN RHINOVIRUS/ENTEROVIRUS  Not Detected   INFLUENZA B PCR  Not Detected   PARAINFLUENZA 1  Not Detected   PARAINFLUENZA VIRUS 2  Not Detected   PARAINFLUENZA VIRUS 3  Not Detected   PARAINFLUENZA VIRUS 4  Not Detected   BORDETELLA PERTUSSIS PCR  Not Detected   CHLAMYDOPHILA PNEUMONIAE PCR  Not Detected   MYCOPLAMA PNEUMO PCR  Not Detected   INFLUENZA A PCR  Not Detected   RSV, PCR  Not Detected         Results from last 7 days   Lab Units 01/26/24  1654   URIC ACID mg/dL 3.6       Imaging:  Imaging Results (Last 24 Hours)       Procedure Component Value Units Date/Time    CT Chest Without Contrast Diagnostic [[id number]] Collected: 01/29/24 1001     Updated: 01/29/24 1015    Narrative:      CT CHEST WITHOUT CONTRAST     HISTORY: On 11/02/2023 patient underwent aortic root replacement with  valve sparing and replacement of the ascending aorta. Sternal  exploration and washout on 11/06/2023.     TECHNIQUE:  CT chest includes axial imaging from the thoracic inlet to  the upper abdomen without IV contrast. Date reconstructed in coronal and  sagittal planes. Radiation dose reduction techniques were utilized,  including automated exposure control and exposure modulation based on  body size.     COMPARISON: CT chest 10/30/2023.     FINDINGS: There has been repair of the aortic root and ascending  thoracic aorta. There is an ascending aortic graft. There is  circumferential low density surrounding  the anterior, posterior, right  lateral aspect of the ascending thoracic aorta measuring 1.5 cm in  thickness that may represent hemorrhage or dissection. This low-density  material in combination with the graft measures 5.5 cm in AP dimension.  This low-density material surrounding the ascending thoracic aorta  tapers to the level of the top of the aortic arch and is not present at  the level of top of the aortic arch and does not surround the ascending  thoracic aorta.     The heart size is enlarged. Left hemodialysis catheter tip extends into  the right atrium. Small bilateral pleural effusions are present and  there is left basilar atelectasis with potential mild left basilar  infiltrate.     Imaging through the upper abdomen demonstrates findings similar to CT  abdomen 3 days ago.     There is ununited median sternotomy.       Impression:      1. There has been repair of the aortic root and ascending thoracic aorta  with placement of a 26 mm Dacron interposition graft with a  valve-sparing procedure. There is a 1.5 cm in thickness band of  low-density material partially encircling the graft that may represent  hemorrhage or dissection and the combination of this low-density  material and the graft of the ascending thoracic aorta measures 5.5 cm  in diameter. CT angiogram chest could be performed to evaluate if  contrast extends into this low-density area surrounding the graft.   2. Cardiomegaly. Ununited median sternotomy.  3. Small bilateral pleural effusions. Mild basilar atelectasis with  potential mild left lower lobe infiltrate.         Findings discussed with nurse Elaina working with Dr. Medina on  1/29/2024 at 9:30 a.m.     Radiation dose reduction techniques were utilized, including automated  exposure control and exposure modulation based on body size.        This report was finalized on 1/29/2024 10:12 AM by Dr. Adolph Londono M.D on Workstation: [id number]                  I reviewed the  patient's new clinical results / labs / tests / procedures      Assessment/Plan     Active Hospital Problems    Diagnosis  POA    **Acute pancreatitis [K85.90]  Yes    Sepsis [A41.9]  No    ESRD (end stage renal disease) [N18.6]  Yes    Chronic diastolic CHF (congestive heart failure) [I50.32]  Yes    Essential hypertension [I10]  Yes    Seizure disorder [G40.909]  Yes    Systemic lupus erythematosus [M32.9]  Yes    Iron deficiency anemia [D50.9]  Yes      Resolved Hospital Problems   No resolved problems to display.           Abdominal pain/nausea and vomiting secondary to pancreatitis/peritonitis.  Patient does not consume alcohol.  Normal liver function test.  Lipase has increased after advancement of the diet.  Tolerating regular diet well.  Improved abdominal pain.  Right upper quadrant ultrasound with no gallbladder abnormalities.  Triglycerides are normal.  No incriminating drugs.  Unclear etiology of the pancreatitis.  Clinically improving.  GI signed off..  Fever/elevated procalcitonin..  Differential diagnosis pancreatitis/intra-abdominal (infected peritoneal fluid) infectious disease does not believe there is any right foot cellulitis.  Infectious disease believes pancreatitis is the cause of fever.  Resolved fever and chills.  White count remains normal.  Procalcitonin is elevated but the patient has renal failure and it is difficult to interpret that.  Blood cultures negative.  Respiratory PCR panel is negative.  Healthy dialysis catheter site.  Awaiting CT scan of the chest.  Patient had recent echo on 12/23 revealing a normal ejection fraction, moderate left ventricular hypertrophy, but prosthetic aortic valve with mild regurgitation.  Appreciate infectious disease help.  I discussed with ID and he is okay with discharge with no antibiotics.  history of lupus nephritis/end-stage renal disease on hemodialysis TTS.  Volume status appears to be normal.  Will continue CellCept and dialysis per nephrology.   Appreciate nephrology help.  hypertension/status post prosthetic aortic valve replacement/dissecting ascending aneurysm repair.  Resolved atypical chest pain.  Positive D-dimer.  CT of the chest with area of abnormalities around the surgical intervention.  No evidence clinically of angina or congestive heart failure.  Blood cultures are negative till now.  I believe that the abnormal CT is postoperative changes but I will ask cardiothoracic surgery to evaluate.  Continue Coreg and lisinopril.  Norvasc DC'd.  Aspirin on hold for now.  Good blood pressure control.  Iron deficiency/folate deficiency/anemia of chronic disease.  Status posttransfusion.   Seizure disorder.  Stable on Vimpat.  No acute focal neurological deficits.  Right foot pain and swelling.  ID doubt cellulitis.  MRSA screen is negative.  Vancomycin DC'd.  Currently on Zosyn.  Improving pain and swelling right lower extremity.   Right lower extremity venous ultrasound is negative.  Right foot x-ray with soft tissue swelling.  Will DC IV antibiotics.  VTE prophylaxis.  Sequential compression devices.      Discussed my findings and plan of treatment with the patient/GI.  Disposition.  Home later on this afternoon or tomorrow morning after cardiothoracic surgery sees the patient and cleared her for discharge.      Lakisha Hunt MD  [facility]  01/29/24  10:27 EST

## 2024-01-30 ENCOUNTER — TRANSITIONAL CARE MANAGEMENT TELEPHONE ENCOUNTER (OUTPATIENT)
Dept: CALL CENTER | Facility: HOSPITAL | Age: 34
End: 2024-01-30
Payer: MEDICARE

## 2024-01-30 NOTE — OUTREACH NOTE
Call Center TCM Note      Flowsheet Row Responses   [facility] patient discharged from? [facility]   Does the patient have one of the following disease processes/diagnoses(primary or secondary)? Other   TCM attempt successful? Yes   Call start time 0859   Call end time 0903   Discharge diagnosis Acute pancreatitis, ESRD on HD   Meds reviewed with patient/caregiver? Yes   Does the patient have all medications ordered at discharge? N/A   Does the patient have an appointment with their PCP within 7-14 days of discharge? No   Nursing Interventions Routed TCM call to PCP office, Patient declined scheduling/rescheduling appointment at this time   Has home health visited the patient within 72 hours of discharge? N/A   Psychosocial issues? No   Did the patient receive a copy of their discharge instructions? Yes   Nursing interventions Reviewed instructions with patient   What is the patient's perception of their health status since discharge? Improving   Is the patient/caregiver able to teach back signs and symptoms related to disease process for when to call PCP? Yes   Is the patient/caregiver able to teach back signs and symptoms related to disease process for when to call 911? Yes   Is the patient/caregiver able to teach back the hierarchy of who to call/visit for symptoms/problems? PCP, Specialist, Home health nurse, Urgent Care, ED, 911 Yes   If the patient is a current smoker, are they able to teach back resources for cessation? Not a smoker   TCM call completed? Yes   Wrap up additional comments D/C DX: Acute pancreatitis, ESRD on HD - Brief call as pt was at dialysis. She is feeling better. abdominal pain improving. Her rt foot is less edemic. Only change to curren medications at d/c was to stop Amlodipine. Pt is having issue getting r/f on her Oxycodone 5mg but states her pharm would not give reason. I suggested she notify prescribing MD for assist. Pt declined TCM APPT with PCP CAESAR Woods  at this  time.   Call end time 0903            Jaja Esparza MA    1/30/2024, 09:35 EST

## 2024-01-30 NOTE — CASE MANAGEMENT/SOCIAL WORK
Case Management Discharge Note      Final Note: Home, no additional CCP needs.         Selected Continued Care - Discharged on 1/29/2024 Admission date: 1/26/2024 - Discharge disposition: Home or Self Care      Destination    No services have been selected for the patient.                Durable Medical Equipment    No services have been selected for the patient.                Dialysis/Infusion    No services have been selected for the patient.                Home Medical Care    No services have been selected for the patient.                Therapy    No services have been selected for the patient.                Community Resources    No services have been selected for the patient.                Community & DME    No services have been selected for the patient.                    Selected Continued Care - Prior Encounters Includes continued care and service providers with selected services from prior encounters from 10/28/2023 to 1/29/2024      Discharged on 12/9/2023 Admission date: 10/26/2023 - Discharge disposition: Skilled Nursing Facility (DC - External)      Destination       Service Provider Selected Services Address Phone Fax Patient Preferred    [facility] [address] [facility] [id number] [phone] --                               Final Discharge Disposition Code: 01 - home or self-care

## 2024-01-31 ENCOUNTER — TELEPHONE (OUTPATIENT)
Dept: CARDIOLOGY | Facility: CLINIC | Age: 34
End: 2024-01-31
Payer: MEDICARE

## 2024-01-31 NOTE — TELEPHONE ENCOUNTER
Called pt. No answer. Left VM that we do not refill this medication and she would have to call her PCP or whom ever prescribed it previously.

## 2024-01-31 NOTE — TELEPHONE ENCOUNTER
Caller: Dee Herrera    Relationship: Self    Best call back number: [phone]    Which medication are you concerned about: OXYODONE 5 MG    Who prescribed you this medication: NA    When did you start taking this medication: 3 MONTHS AGO    What are your concerns: PATIENT NEEDS REFILL OF THIS MEDICATION. PLEASE REACH OUT TO PATIENT.     How long have you had these concerns:   NA

## 2024-02-01 LAB
BACTERIA SPEC AEROBE CULT: NORMAL
BACTERIA SPEC AEROBE CULT: NORMAL

## 2024-02-01 NOTE — TELEPHONE ENCOUNTER
Rx Refill Note  Requested Prescriptions     Pending Prescriptions Disp Refills    oxyCODONE (ROXICODONE) 5 MG immediate release tablet       Sig: Take 1 tablet by mouth Every 6 (Six) Hours As Needed for Severe Pain.      Last office visit with prescribing clinician: 1/10/2024   Last telemedicine visit with prescribing clinician: Visit date not found   Next office visit with prescribing clinician: 2/12/2024                         Would you like a call back once the refill request has been completed: [] Yes [] No    If the office needs to give you a call back, can they leave a voicemail: [] Yes [] No    Cheyenne Kim MA  02/01/24, 08:32 EST

## 2024-02-02 RX ORDER — OXYCODONE HYDROCHLORIDE 5 MG/1
5 TABLET ORAL EVERY 6 HOURS PRN
OUTPATIENT
Start: 2024-02-02

## 2024-02-04 ENCOUNTER — PATIENT MESSAGE (OUTPATIENT)
Dept: FAMILY MEDICINE CLINIC | Facility: CLINIC | Age: 34
End: 2024-02-04
Payer: MEDICARE

## 2024-02-06 ENCOUNTER — OFFICE VISIT (OUTPATIENT)
Dept: FAMILY MEDICINE CLINIC | Facility: CLINIC | Age: 34
End: 2024-02-06
Payer: MEDICARE

## 2024-02-06 ENCOUNTER — TELEPHONE (OUTPATIENT)
Dept: FAMILY MEDICINE CLINIC | Facility: CLINIC | Age: 34
End: 2024-02-06

## 2024-02-06 VITALS
WEIGHT: 101 LBS | DIASTOLIC BLOOD PRESSURE: 82 MMHG | SYSTOLIC BLOOD PRESSURE: 112 MMHG | BODY MASS INDEX: 17.24 KG/M2 | HEIGHT: 64 IN | TEMPERATURE: 98.2 F | HEART RATE: 76 BPM

## 2024-02-06 DIAGNOSIS — I10 ESSENTIAL HYPERTENSION: ICD-10-CM

## 2024-02-06 DIAGNOSIS — Z09 HOSPITAL DISCHARGE FOLLOW-UP: Primary | ICD-10-CM

## 2024-02-06 DIAGNOSIS — M54.9 BACK PAIN, UNSPECIFIED BACK LOCATION, UNSPECIFIED BACK PAIN LATERALITY, UNSPECIFIED CHRONICITY: ICD-10-CM

## 2024-02-06 DIAGNOSIS — R10.10 UPPER ABDOMINAL PAIN: ICD-10-CM

## 2024-02-06 DIAGNOSIS — S46.812A STRAIN OF LEFT TRAPEZIUS MUSCLE, INITIAL ENCOUNTER: ICD-10-CM

## 2024-02-06 DIAGNOSIS — K85.90 ACUTE PANCREATITIS, UNSPECIFIED COMPLICATION STATUS, UNSPECIFIED PANCREATITIS TYPE: ICD-10-CM

## 2024-02-06 DIAGNOSIS — R01.1 HEART MURMUR: ICD-10-CM

## 2024-02-06 NOTE — TELEPHONE ENCOUNTER
CALLED PATIENT TO GIVE PROVIDER FOR GASTRO PHONE NUMBER SHE CAN GO TO ANY PROVIDER IN OFFICE [phone] HUB TO RELAY

## 2024-02-07 ENCOUNTER — HOSPITAL ENCOUNTER (EMERGENCY)
Facility: HOSPITAL | Age: 34
Discharge: HOME | End: 2024-02-07
Attending: EMERGENCY MEDICINE | Admitting: EMERGENCY MEDICINE
Payer: MEDICARE

## 2024-02-07 ENCOUNTER — READMISSION MANAGEMENT (OUTPATIENT)
Dept: CALL CENTER | Facility: HOSPITAL | Age: 34
End: 2024-02-07
Payer: MEDICARE

## 2024-02-07 VITALS
HEART RATE: 108 BPM | DIASTOLIC BLOOD PRESSURE: 120 MMHG | OXYGEN SATURATION: 100 % | RESPIRATION RATE: 18 BRPM | SYSTOLIC BLOOD PRESSURE: 155 MMHG | TEMPERATURE: 98.2 F | WEIGHT: 101 LBS | BODY MASS INDEX: 16.23 KG/M2 | HEIGHT: 66 IN

## 2024-02-07 DIAGNOSIS — Z99.2 END-STAGE RENAL DISEASE ON HEMODIALYSIS: ICD-10-CM

## 2024-02-07 DIAGNOSIS — R10.9 ABDOMINAL PAIN, UNSPECIFIED ABDOMINAL LOCATION: Primary | ICD-10-CM

## 2024-02-07 DIAGNOSIS — N18.6 END-STAGE RENAL DISEASE ON HEMODIALYSIS: ICD-10-CM

## 2024-02-07 DIAGNOSIS — K85.90 ACUTE PANCREATITIS, UNSPECIFIED COMPLICATION STATUS, UNSPECIFIED PANCREATITIS TYPE: ICD-10-CM

## 2024-02-07 LAB
ALBUMIN SERPL-MCNC: 2.8 G/DL (ref 3.5–5.2)
ALBUMIN/GLOB SERPL: 0.7 G/DL
ALP SERPL-CCNC: 68 U/L (ref 39–117)
ALT SERPL W P-5'-P-CCNC: 9 U/L (ref 1–33)
ANION GAP SERPL CALCULATED.3IONS-SCNC: 13 MMOL/L (ref 5–15)
AST SERPL-CCNC: 19 U/L (ref 1–32)
BASOPHILS # BLD AUTO: 0.02 10*3/MM3 (ref 0–0.2)
BASOPHILS NFR BLD AUTO: 0.2 % (ref 0–1.5)
BILIRUB SERPL-MCNC: 0.3 MG/DL (ref 0–1.2)
BUN SERPL-MCNC: 41 MG/DL (ref 6–20)
BUN/CREAT SERPL: 4 (ref 7–25)
CALCIUM SPEC-SCNC: 9 MG/DL (ref 8.6–10.5)
CHLORIDE SERPL-SCNC: 96 MMOL/L (ref 98–107)
CO2 SERPL-SCNC: 23 MMOL/L (ref 22–29)
CREAT SERPL-MCNC: 10.27 MG/DL (ref 0.57–1)
DEPRECATED RDW RBC AUTO: 47.4 FL (ref 37–54)
EGFRCR SERPLBLD CKD-EPI 2021: 4.7 ML/MIN/1.73
EOSINOPHIL # BLD AUTO: 0.07 10*3/MM3 (ref 0–0.4)
EOSINOPHIL NFR BLD AUTO: 0.9 % (ref 0.3–6.2)
ERYTHROCYTE [DISTWIDTH] IN BLOOD BY AUTOMATED COUNT: 16.6 % (ref 12.3–15.4)
GLOBULIN UR ELPH-MCNC: 4 GM/DL
GLUCOSE SERPL-MCNC: 96 MG/DL (ref 65–99)
HCG SERPL QL: NEGATIVE
HCT VFR BLD AUTO: 29.2 % (ref 34–46.6)
HGB BLD-MCNC: 9.1 G/DL (ref 12–15.9)
HOLD SPECIMEN: NORMAL
IMM GRANULOCYTES # BLD AUTO: 0.05 10*3/MM3 (ref 0–0.05)
IMM GRANULOCYTES NFR BLD AUTO: 0.6 % (ref 0–0.5)
LIPASE SERPL-CCNC: 389 U/L (ref 13–60)
LYMPHOCYTES # BLD AUTO: 0.54 10*3/MM3 (ref 0.7–3.1)
LYMPHOCYTES NFR BLD AUTO: 6.7 % (ref 19.6–45.3)
MCH RBC QN AUTO: 24.8 PG (ref 26.6–33)
MCHC RBC AUTO-ENTMCNC: 31.2 G/DL (ref 31.5–35.7)
MCV RBC AUTO: 79.6 FL (ref 79–97)
MONOCYTES # BLD AUTO: 0.84 10*3/MM3 (ref 0.1–0.9)
MONOCYTES NFR BLD AUTO: 10.5 % (ref 5–12)
NEUTROPHILS NFR BLD AUTO: 6.5 10*3/MM3 (ref 1.7–7)
NEUTROPHILS NFR BLD AUTO: 81.1 % (ref 42.7–76)
NRBC BLD AUTO-RTO: 0 /100 WBC (ref 0–0.2)
PLATELET # BLD AUTO: 305 10*3/MM3 (ref 140–450)
PMV BLD AUTO: 12 FL (ref 6–12)
POTASSIUM SERPL-SCNC: 3.8 MMOL/L (ref 3.5–5.2)
PROT SERPL-MCNC: 6.8 G/DL (ref 6–8.5)
RBC # BLD AUTO: 3.67 10*6/MM3 (ref 3.77–5.28)
SODIUM SERPL-SCNC: 132 MMOL/L (ref 136–145)
WBC NRBC COR # BLD AUTO: 8.02 10*3/MM3 (ref 3.4–10.8)
WHOLE BLOOD HOLD COAG: NORMAL
WHOLE BLOOD HOLD SPECIMEN: NORMAL

## 2024-02-07 PROCEDURE — 25010000002 ONDANSETRON PER 1 MG: Performed by: NURSE PRACTITIONER

## 2024-02-07 PROCEDURE — 96375 TX/PRO/DX INJ NEW DRUG ADDON: CPT

## 2024-02-07 PROCEDURE — 96374 THER/PROPH/DIAG INJ IV PUSH: CPT

## 2024-02-07 PROCEDURE — 99283 EMERGENCY DEPT VISIT LOW MDM: CPT

## 2024-02-07 PROCEDURE — 80053 COMPREHEN METABOLIC PANEL: CPT | Performed by: NURSE PRACTITIONER

## 2024-02-07 PROCEDURE — 25010000002 HYDROMORPHONE 1 MG/ML SOLUTION: Performed by: NURSE PRACTITIONER

## 2024-02-07 PROCEDURE — 85025 COMPLETE CBC W/AUTO DIFF WBC: CPT | Performed by: NURSE PRACTITIONER

## 2024-02-07 PROCEDURE — 83690 ASSAY OF LIPASE: CPT | Performed by: NURSE PRACTITIONER

## 2024-02-07 PROCEDURE — 84703 CHORIONIC GONADOTROPIN ASSAY: CPT | Performed by: NURSE PRACTITIONER

## 2024-02-07 RX ORDER — ONDANSETRON 4 MG/1
4 TABLET, ORALLY DISINTEGRATING ORAL EVERY 8 HOURS PRN
Qty: 30 TABLET | Refills: 0 | Status: SHIPPED | OUTPATIENT
Start: 2024-02-07

## 2024-02-07 RX ORDER — OXYCODONE HYDROCHLORIDE AND ACETAMINOPHEN 5; 325 MG/1; MG/1
1 TABLET ORAL EVERY 4 HOURS PRN
Qty: 12 TABLET | Refills: 0 | Status: SHIPPED | OUTPATIENT
Start: 2024-02-07

## 2024-02-07 RX ORDER — SODIUM CHLORIDE 0.9 % (FLUSH) 0.9 %
10 SYRINGE (ML) INJECTION AS NEEDED
Status: DISCONTINUED | OUTPATIENT
Start: 2024-02-07 | End: 2024-02-07 | Stop reason: HOSPADM

## 2024-02-07 RX ORDER — ONDANSETRON 2 MG/ML
4 INJECTION INTRAMUSCULAR; INTRAVENOUS ONCE
Status: COMPLETED | OUTPATIENT
Start: 2024-02-07 | End: 2024-02-07

## 2024-02-07 RX ADMIN — HYDROMORPHONE HYDROCHLORIDE 1 MG: 1 INJECTION, SOLUTION INTRAMUSCULAR; INTRAVENOUS; SUBCUTANEOUS at 13:05

## 2024-02-07 RX ADMIN — ONDANSETRON 4 MG: 2 INJECTION INTRAMUSCULAR; INTRAVENOUS at 13:05

## 2024-02-07 NOTE — ED NOTES
Pt arrives via ems from home for abdominal pain. Pt recently discharged with pancreatitis. Pt is a dialysis pt and goes Tu, Th and Sat. Pt is up to date on her dialysis treatments. Pt reports nausea but no vomiting. Pt is a no stick on the left arm for dialysis. Restricted extremity band placed on pt

## 2024-02-07 NOTE — OUTREACH NOTE
Medical Week 2 Survey      Flowsheet Row Responses   [facility] patient discharged from? [facility]   Does the patient have one of the following disease processes/diagnoses(primary or secondary)? Other   Week 2 attempt successful? No   Unsuccessful attempts Attempt 1  [Pt in ED today]            Regi OLGUIN - Licensed Nurse

## 2024-02-07 NOTE — TELEPHONE ENCOUNTER
From: Dee Herrera  To: Margarita Woods  Sent: 2/4/2024 11:07 AM EST  Subject: Need pain meds    In pain ibuprofen Tylenol extra strength advil nothing is working can somebody tell me something

## 2024-02-07 NOTE — ED PROVIDER NOTES
"SHARED VISIT: This visit was performed by BOTH a physician and an APC. The substantive portion of the medical decision making was performed by this attesting physician who made or approved the management plan and takes responsibility for patient management. All studies in the APC note (if performed) were independently interpreted by me.     The JANENE and I have discussed this patient's history, physical exam, and treatment plan.  I have reviewed the documentation and personally had a face to face interaction with the patient. I affirm the documentation and agree with the treatment and plan.  The attached note describes my personal findings.      I provided a substantive portion of the care of the patient.  I personally performed the physical exam in its entirety, and below are my findings.     Brief history of present illness: 33-year-old female with multiple chronic medical conditions returns today with acute recurrence of left-sided abdominal pain.  Patient reports it feels very similar to prior pancreatitis for which she was recently hospitalized.    Physical exam:    /100   Pulse 106   Temp 98.2 °F (36.8 °C) (Oral)   Resp 18   Ht 167.6 cm (66\")   Wt 45.8 kg (101 lb)   LMP  (LMP Unknown) Comment: pt states she's not having periods anymore  SpO2 96%   BMI 16.30 kg/m²       Physical Exam   Constitutional: No distress.  Nontoxic but chronically ill-appearing  HENT:  Head: Normocephalic and atraumatic.   Oropharynx: Mucous membranes are moist.   Eyes: . No scleral icterus.   Neck: Normal range of motion. Neck supple.   Cardiovascular: Pink warm and well perfused throughout.    Pulmonary/Chest: No respiratory distress.    Abdominal: Soft.  There is diffuse discomfort palpation but no rigidity appreciated.  Musculoskeletal: Moves all extremities equally.    Neurological: Alert and oriented.  Speech fluent and easily intelligible  Skin: Skin is pink, warm, and dry.   Psychiatric: Mood and affect normal. "   Nursing note and vitals reviewed.        MDM:  My differential diagnosis for abdominal pain includes but is not limited to:  Gastritis, gastroenteritis, peptic ulcer disease, GERD, esophageal perforation, acute appendicitis, mesenteric adenitis, Meckel’s diverticulum, epiploic appendagitis, diverticulitis, colon cancer, ulcerative colitis, Crohn’s disease, intussusception, small bowel obstruction, adhesions, ischemic bowel, perforated viscus, ileus, obstipation, biliary colic, cholecystitis, cholelithiasis, James-Kings[place] Robles, hepatitis, pancreatitis, common bile duct obstruction, cholangitis, bile leak, splenic trauma, splenic rupture, splenic infarction, splenic abscess, abdominal abscess, ascites, spontaneous bacterial peritonitis, hernia, UTI, cystitis,ureterolithiasis, urinary obstruction, ovarian cyst, torsion, pregnancy, ectopic pregnancy, PID, pelvic abscess, mittelschmerz, endometriosis, AAA, myocardial infarction, pneumonia, cancer, porphyria, DKA, medications, sickle cell, viral syndrome, zoster      Please note that portions of this were completed with a voice recognition program.       Note Disclaimer: At [facility], we believe that sharing information builds trust and better relationships. You are receiving this note because you are receiving care at [facility] or recently visited. It is possible you will see health information before a provider has talked with you about it. This kind of information can be easy to misunderstand. To help you fully understand what it means for your health, we urge you to discuss this note with your provider.     Vinnie Phillips MD  02/07/24 [id number]

## 2024-02-07 NOTE — ED PROVIDER NOTES
EMERGENCY DEPARTMENT ENCOUNTER  Room Number:  08/08  PCP: Margarita Woods APRN  Independent Historians: Patient      HPI:  Chief Complaint: had concerns including Abdominal Pain and Nausea.     A complete HPI/ROS/PMH/PSH/SH/FH are unobtainable due to: None    Chronic or social conditions impacting patient care (Social Determinants of Health): None      Context: Dee Herrera is an afebrile ambulatory 33 y.o.black female with a medical history of lupus, congestive heart failure, hypertension, end-stage renal disease on hemodialysis who presents to the ED c/o acute abdominal pain.    She states that started about 2 days ago.  She endorses poor appetite.  She denies any vomiting or diarrhea.  She reports her last bowel movement was yesterday which was nonbloody.    History of end-stage renal disease on hemodialysis, last hemodialysis treatment was yesterday.  Dialysis access is to the left side of her neck via tunneled cath, she does not have the fistula.  She is not on any transplant list.    She denies any fevers or chills.  States she is having left upper abdominal discomfort similar to when she has had pancreatitis.      Review of prior external notes (non-ED) -and- Review of prior external test results outside of this encounter:  CT abdomen pelvis 1/26/2024 showed acute pancreatitis fairly complicated recent admission to the hospital 1/26-1/29:  Initial ER evaluation included a CMP that was normal except a creatinine of 6.38, sodium 135, albumin 2.7, GFR 8.4.  Lipase was 295.  hCG negative.  CBC normal except hemoglobin of 7.2.  CRP was elevated at 10.5.  ESR elevated at 48.  Uric acid is normal.  Respiratory PCR panel is negative.  CT abdomen and pelvis without contrast revealed pancreatitis with peripancreatic fluid collection and mild diffuse cystitis with small left pleural effusion and cardiomegaly and previous chest herniotomy and retrosternal soft tissue thickening particularly in the aortocaval  region related to reactive adenopathy.  Right foot x-ray with soft tissue swelling.  Impression this lady was that of abdominal pain/nausea/vomiting secondary to pancreatitis and probably peritonitis.  She does not consume alcohol.  Liver function test was normal.  Lipase was elevated.  She was placed n.p.o. and started empirically on Zosyn for the possibility of peritonitis.  Right upper quadrant ultrasound revealed no gallbladder abnormalities.  Triglycerides were normal.  There was no incriminating drug causing her pancreatitis.  Etiology of the pancreatitis was unknown.  GI followed up with the patient and okayed the discharge and we will refer her to GI as outpatient.  Her other issue was fever and procalcitonin was elevated.  She was empirically started on IV Zosyn for the possibility of infected intra-abdominal fluid.  Cellulitis of the right lower extremity was also entertained.  Her white count remained normal.  Blood cultures were negative by the time of discharge.  Respiratory PCR was negative.  Her dialysis catheter site was healthy.  CT scan of the chest was as mentioned above.  Infectious disease consulted and eventual diagnosis of her fever was reactive secondary to pancreatitis.  Antibiotic were DC'd.  Diet was slowly advanced and she tolerated regular diet at the time of discharge.  She has a history of lupus nephritis with end-stage renal disease on hemodialysis TTS and nephrology followed up with the patient performed dialysis.  Her Plaquenil was resumed at the time of discharge.  She has a history of hypertension status post recent prosthetic aortic valve placement and dissecting ascending aortic aneurysm repair.  Her blood pressure was on the low side and her Norvasc was DC'd and she was maintained on lisinopril and Coreg and her blood pressure remained stable.  No evidence of angina or congestive heart failure cardiothoracic saw the patient and believe that the CT changes are postoperative and  does not recommend any surgical intervention.  Her blood pressure was on the low side and Norvasc was DC'd anemia is secondary to iron deficiency/folate deficiency/anemia of chronic disease and she was transfused during dialysis during this admission and her hemoglobin has improved.  For her seizure disorder Vimpat was maintained and she did not experience any seizures during this admission.  ID did not think that the patient has right foot cellulitis and had x-ray of the right foot revealed only swelling that has improved.  At the time of discharge she was tolerating regular diet well with decrease in the abdominal pain even though her lipase was mildly elevated after the diet advancement.  Patient was hemodynamically stable.     Prescription drug monitoring program review:     RANDI query complete and reviewed. Treatment plan to include limited course of prescribed controlled substance. Risks including addiction, benefits, and alternatives presented to patient.    PAST MEDICAL HISTORY  Active Ambulatory Problems     Diagnosis Date Noted    Vitamin D deficiency 09/27/2021    Iron deficiency anemia 09/27/2021    Morning stiffness of joints 04/21/2022    Iron deficiency anemia, unspecified iron deficiency anemia type 07/11/2022    Thrombocytopenia 07/20/2022    Acute renal failure (ARF) 07/20/2022    Hypertension secondary to other renal disorders 07/20/2022    Pancytopenia 07/20/2022    Hypoalbuminemia 07/20/2022    Volume overload 07/20/2022    Ear drainage right 07/20/2022    T.T.P. syndrome 07/21/2022    Systemic lupus erythematosus 07/30/2022    Lupus nephritis, ISN/RPS class IV 07/30/2022    Hypokalemia 09/13/2022    Hypocalcemia 09/13/2022    COVID-19 10/19/2022    Hospital discharge follow-up 10/19/2022    Stage 5 chronic kidney disease 11/15/2022    Cardiac cirrhosis 02/01/2023    Pancreatitis 02/01/2023    Duodenitis 02/01/2023    Regional enteritis of small bowel 02/01/2023    Pericardial effusion  02/14/2023    End stage renal disease on dialysis 02/14/2023    Hemodialysis status 02/14/2023    Seizure disorder 02/14/2023    Elevated liver function tests 02/14/2023    C. difficile colitis 02/14/2023    Anemia, chronic disease 02/18/2023    Essential hypertension 02/18/2023    Peritoneal dialysis catheter in place 04/03/2023    Anemia due to chronic kidney disease, on chronic dialysis 04/03/2023    Alternating constipation and diarrhea 05/23/2023    Abnormal stress test 05/26/2023    Hyponatremia 10/26/2023    Poor appetite 10/26/2023    Moderate malnutrition 10/26/2023    Chronic diastolic CHF (congestive heart failure) 10/26/2023    Aortic valve lesion 10/28/2023    Severe aortic valve regurgitation 10/28/2023    Dissecting ascending aortic aneurysm 10/30/2023    Recent cerebrovascular accident (CVA) 11/17/2023    Internal jugular (IJ) vein thromboembolism, chronic 12/04/2023    Acute heart failure with preserved ejection fraction (HFpEF) 12/09/2023    Acute on chronic anemia 01/06/2024    Symptomatic anemia 01/06/2024    Weakness generalized 01/11/2024    Bilateral low back pain 01/11/2024    Acute pancreatitis 01/26/2024    ESRD (end stage renal disease) 01/26/2024    Thoracic ascending aortic aneurysm 01/29/2024     Resolved Ambulatory Problems     Diagnosis Date Noted    Anemia, unspecified type 04/21/2022    Elevated troponin 07/20/2022    Nausea and vomiting 07/20/2022    Hypertensive urgency 07/20/2022    Combined systolic and diastolic congestive heart failure 12/08/2022    Pericardial effusion 02/01/2023    Amyloid disease 02/03/2023    Hypertensive urgency 02/14/2023    Abdominal pain 02/18/2023    Sepsis 01/27/2024    Fever 01/29/2024     Past Medical History:   Diagnosis Date    Anasarca     Anxiety     CHF (congestive heart failure)     Dry skin     ESRD (end stage renal disease) on dialysis     History of abdominal pain     History of anemia     History of transfusion     Hypertension     Lupus  (systemic lupus erythematosus) 07/30/2022    Migraine     Other specified nutritional anemias     Renal insufficiency     Scoliosis     Seizures     Shortness of breath          PAST SURGICAL HISTORY  Past Surgical History:   Procedure Laterality Date    ASCENDING AORTIC ANEURYSM REPAIR W/ MECHANICAL AORTIC VALVE REPLACEMENT N/A 11/2/2023    Procedure: CHETNA STERNOTOMY, AORTIC ROOT REPLACEMENT WITH VALVE SPARING MISHEL PROCEDURE, REPLACEMENT OF ASCENDING AORTA, RIGHT FEMORAL DIALYSIS CATHETER PLACEMENT AND PRP;  Surgeon: Chris Medina MD;  Location: [facility];  Service: Cardiothoracic;  Laterality: N/A;    CARDIAC CATHETERIZATION N/A 06/14/2023    Procedure: Coronary angiography;  Surgeon: Juana Taylor MD;  Location: [facility] CATH INVASIVE LOCATION;  Service: Cardiovascular;  Laterality: N/A;    CARDIAC CATHETERIZATION N/A 06/14/2023    Procedure: Left heart cath;  Surgeon: Juana Taylor MD;  Location: [facility] CATH INVASIVE LOCATION;  Service: Cardiovascular;  Laterality: N/A;    CARDIAC CATHETERIZATION N/A 06/14/2023    Procedure: Right Heart Cath;  Surgeon: Juana Taylor MD;  Location: [facility] CATH INVASIVE LOCATION;  Service: Cardiovascular;  Laterality: N/A;    COLONOSCOPY N/A 7/20/2023    Procedure: COLONOSCOPY to cecum with biopsy;  Surgeon: Drew Kaminski MD;  Location: [facility] ENDOSCOPY;  Service: Gastroenterology;  Laterality: N/A;  PRE - diarrhea, constipation  POST - fair prep, normal    CORONARY ARTERY BYPASS GRAFT N/A 11/6/2023    Procedure: STERNAL EXPLORATION AND WASH OUT;  Surgeon: Jr Mitesh Quiroz MD;  [facility];  Service: Cardiothoracic;  Laterality: N/A;    ENDOSCOPY N/A 7/20/2023    Procedure: ESOPHAGOGASTRODUODENOSCOPY with biopsy;  Surgeon: Drew Kaminski MD;  Location: [facility] ENDOSCOPY;  Service: Gastroenterology;  Laterality: N/A;  PRE - abn ct abd  POST - gastritis    INSERTION HEMODIALYSIS CATHETER N/A 07/26/2022    Procedure: RIGHT TUNNELED DIALYSIS CATHETER  PLACEMENT;  Surgeon: Diandra Adhikari MD;  Location: [facility] MAIN OR;  Service: Vascular;  Laterality: N/A;    INSERTION HEMODIALYSIS CATHETER Left 11/29/2023    Procedure: TUNNELED DIALYSIS CATHETER PLACEMENT;  Surgeon: Jose Patel II, MD;  Location: [facility] MAIN OR;  Service: Vascular;  Laterality: Left;    INSERTION PERITONEAL DIALYSIS CATHETER N/A 04/03/2023    Procedure: INSERTION PERITONEAL DIALYSIS CATHETER LAPAROSCOPIC, omentumpexy;  Surgeon: Jemal Loyola MD;  Location: [facility] MA[facility] OR;  Service: General;  Laterality: N/A;    REMOVAL PERITONEAL DIALYSIS CATHETER N/A 12/1/2023    Procedure: REMOVAL PERITONEAL DIALYSIS CATHETER;  Surgeon: Maryellen Ashton MD;  Location: [facility] MAIN OR;  Service: General;  Laterality: N/A;    TONSILLECTOMY           FAMILY HISTORY  Family History   Problem Relation Age of Onset    Autoimmune disease Mother     Anemia Mother     Diabetes Sister     Anemia Brother     Diabetes Maternal Grandmother     Hypertension Maternal Grandmother     Cancer Maternal Grandmother     Sickle cell anemia Cousin     Malig Hyperthermia Neg Hx          SOCIAL HISTORY  Social History     Socioeconomic History    Marital status: Single   Tobacco Use    Smoking status: Some Days     Types: Cigars     Passive exposure: Past    Smokeless tobacco: Never    Tobacco comments:     Patient smoked black & mild   Vaping Use    Vaping Use: Never used   Substance and Sexual Activity    Alcohol use: Yes     Alcohol/week: 3.0 standard drinks of alcohol     Types: 2 Glasses of wine, 1 Shots of liquor per week     Comment: social    Drug use: Yes     Types: Marijuana, Oxycodone     Comment: OCCASIONAL    Sexual activity: Not Currently     Partners: Male     Birth control/protection: Condom, None         ALLERGIES  Minoxidil      REVIEW OF SYSTEMS  Review of Systems  Included in HPI  All systems reviewed and negative except for those discussed in HPI.      PHYSICAL EXAM    I have reviewed the  triage vital signs and nursing notes.    ED Triage Vitals [02/07/24 1233]   Temp Heart Rate Resp BP SpO2   98.2 °F (36.8 °C) 106 18 144/100 96 %      Temp src Heart Rate Source Patient Position BP Location FiO2 (%)   Oral Monitor -- -- --       Physical Exam  GENERAL: alert, no acute distress, uncomfortable appearing, appears chronically ill/older than stated age  SKIN: Warm, dry, and intact  HENT: Normocephalic, atraumatic  EYES: no scleral icterus, no injection, normal extraocular movements  CV: regular rhythm, regular rate, no peripheral edema, 1/6 soft systolic murmur, tunneled cath the left side of neck  RESPIRATORY: normal effort, lungs clear, clear to auscultation all fields bilaterally  ABDOMEN: soft, with mild to moderate left upper quadrant abdominal tenderness to palpation without rebound or guarding,, nondistended, bowel sounds WNL  MUSCULOSKELETAL: no deformity, normal active range of motion to all extremities  NEURO: alert, moves all extremities, follows commands                                                               LAB RESULTS  Recent Results (from the past 24 hour(s))   Comprehensive Metabolic Panel    Collection Time: 02/07/24 12:59 PM    Specimen: Blood   Result Value Ref Range    Glucose 96 65 - 99 mg/dL    BUN 41 (H) 6 - 20 mg/dL    Creatinine 10.27 (H) 0.57 - 1.00 mg/dL    Sodium 132 (L) 136 - 145 mmol/L    Potassium 3.8 3.5 - 5.2 mmol/L    Chloride 96 (L) 98 - 107 mmol/L    CO2 23.0 22.0 - 29.0 mmol/L    Calcium 9.0 8.6 - 10.5 mg/dL    Total Protein 6.8 6.0 - 8.5 g/dL    Albumin 2.8 (L) 3.5 - 5.2 g/dL    ALT (SGPT) 9 1 - 33 U/L    AST (SGOT) 19 1 - 32 U/L    Alkaline Phosphatase 68 39 - 117 U/L    Total Bilirubin 0.3 0.0 - 1.2 mg/dL    Globulin 4.0 gm/dL    A/G Ratio 0.7 g/dL    BUN/Creatinine Ratio 4.0 (L) 7.0 - 25.0    Anion Gap 13.0 5.0 - 15.0 mmol/L    eGFR 4.7 (L) >60.0 mL/min/1.73   Lipase    Collection Time: 02/07/24 12:59 PM    Specimen: Blood   Result Value Ref Range    Lipase  389 (H) 13 - 60 U/L   hCG, Serum, Qualitative    Collection Time: 02/07/24 12:59 PM    Specimen: Blood   Result Value Ref Range    HCG Qualitative Negative Negative   Green Top (Gel)    Collection Time: 02/07/24 12:59 PM   Result Value Ref Range    Extra Tube Hold for add-ons.    Lavender Top    Collection Time: 02/07/24 12:59 PM   Result Value Ref Range    Extra Tube hold for add-on    Light Blue Top    Collection Time: 02/07/24 12:59 PM   Result Value Ref Range    Extra Tube Hold for add-ons.    CBC Auto Differential    Collection Time: 02/07/24 12:59 PM    Specimen: Blood   Result Value Ref Range    WBC 8.02 3.40 - 10.80 10*3/mm3    RBC 3.67 (L) 3.77 - 5.28 10*6/mm3    Hemoglobin 9.1 (L) 12.0 - 15.9 g/dL    Hematocrit 29.2 (L) 34.0 - 46.6 %    MCV 79.6 79.0 - 97.0 fL    MCH 24.8 (L) 26.6 - 33.0 pg    MCHC 31.2 (L) 31.5 - 35.7 g/dL    RDW 16.6 (H) 12.3 - 15.4 %    RDW-SD 47.4 37.0 - 54.0 fl    MPV 12.0 6.0 - 12.0 fL    Platelets 305 140 - 450 10*3/mm3    Neutrophil % 81.1 (H) 42.7 - 76.0 %    Lymphocyte % 6.7 (L) 19.6 - 45.3 %    Monocyte % 10.5 5.0 - 12.0 %    Eosinophil % 0.9 0.3 - 6.2 %    Basophil % 0.2 0.0 - 1.5 %    Immature Grans % 0.6 (H) 0.0 - 0.5 %    Neutrophils, Absolute 6.50 1.70 - 7.00 10*3/mm3    Lymphocytes, Absolute 0.54 (L) 0.70 - 3.10 10*3/mm3    Monocytes, Absolute 0.84 0.10 - 0.90 10*3/mm3    Eosinophils, Absolute 0.07 0.00 - 0.40 10*3/mm3    Basophils, Absolute 0.02 0.00 - 0.20 10*3/mm3    Immature Grans, Absolute 0.05 0.00 - 0.05 10*3/mm3    nRBC 0.0 0.0 - 0.2 /100 WBC         RADIOLOGY  No Radiology Exams Resulted Within Past 24 Hours      MEDICATIONS GIVEN IN ER  Medications   sodium chloride 0.9 % flush 10 mL (has no administration in time range)   sodium chloride 0.9 % flush 10 mL (has no administration in time range)   HYDROmorphone (DILAUDID) injection 1 mg (1 mg Intravenous Given 2/7/24 1305)   ondansetron (ZOFRAN) injection 4 mg (4 mg Intravenous Given 2/7/24 1305)         ORDERS  PLACED DURING THIS VISIT:  Orders Placed This Encounter   Procedures    [place] Draw    Comprehensive Metabolic Panel    Lipase    Urinalysis With Microscopic If Indicated (No Culture) - Urine, Clean Catch    hCG, Serum, Qualitative    Urine Drug Screen - Urine, Clean Catch    CBC Auto Differential    Insert Peripheral IV    Insert Peripheral IV    Saline Lock IV    CBC & Differential    Green Top (Gel)    Lavender Top    Light Blue Top         OUTPATIENT MEDICATION MANAGEMENT:  Current Facility-Administered Medications Ordered in Epic   Medication Dose Route Frequency Provider Last Rate Last Admin    sodium chloride 0.9 % flush 10 mL  10 mL Intravenous PRN Lidia Andre, CAESAR        sodium chloride 0.9 % flush 10 mL  10 mL Intravenous PRN Lidia Andre, CAESAR         Current Outpatient Medications Ordered in Epic   Medication Sig Dispense Refill    aspirin 81 MG chewable tablet Chew 1 tablet Daily.      carvedilol (COREG) 25 MG tablet Take 1 tablet by mouth Every 12 (Twelve) Hours. 60 tablet 0    escitalopram (LEXAPRO) 10 MG tablet Take 1 tablet by mouth Daily.      lacosamide (VIMPAT) 100 MG tablet tablet Take 1 tablet by mouth Every 12 (Twelve) Hours. 6 tablet 0    lisinopril (PRINIVIL,ZESTRIL) 10 MG tablet Take 1 tablet by mouth Daily.      mycophenolate (CELLCEPT) 500 MG tablet Take 0.5 tablets by mouth Every 12 (Twelve) Hours. 30 tablet 0    ondansetron (Zofran) 4 MG tablet Take 1 tablet by mouth Every 8 (Eight) Hours As Needed for Nausea or Vomiting. 30 tablet 1    oxyCODONE (ROXICODONE) 5 MG immediate release tablet Take 1 tablet by mouth Every 6 (Six) Hours As Needed for Severe Pain.      Diclofenac Sodium (VOLTAREN) 1 % gel gel Apply 4 g topically to the appropriate area as directed 3 (Three) Times a Day. 150 g 0    folic acid (FOLVITE) 1 MG tablet Take 1 tablet by mouth Daily. 90 tablet 1    hydroxychloroquine (PLAQUENIL) 200 MG tablet Take 1 tablet by mouth Daily.      ondansetron ODT (ZOFRAN-ODT) 4 MG  disintegrating tablet Place 1 tablet on the tongue Every 8 (Eight) Hours As Needed for Vomiting or Nausea. 30 tablet 0    oxyCODONE-acetaminophen (PERCOCET) 5-325 MG per tablet Take 1 tablet by mouth Every 4 (Four) Hours As Needed for Moderate Pain. 12 tablet 0    polyethylene glycol (MIRALAX) 17 GM/SCOOP powder Take 17 g by mouth As Needed.           PROCEDURES  Procedures            PROGRESS, DATA ANALYSIS, CONSULTS, AND MEDICAL DECISION MAKING  All labs have been independently interpreted by me.  All radiology studies have been reviewed by me. All EKG's have been independently viewed and interpreted by me.  Discussion below represents my analysis of pertinent findings related to patient's condition, differential diagnosis, treatment plan and final disposition.    Differential diagnosis includes but is not limited to pancreatitis, gastroparesis, chronic abdominal pain.      MDM: I considered ordering a CT abdomen pelvis on this patient with reassuring labs and history of chronic pancreatitis with CT abdomen pelvis last month I do not think that it would add much to the clinical picture.  She does not have any fevers or chills.  She is compliant with her hemodialysis and her creatinine was certainly at baseline.  She is not having any urinary symptoms and I do not think that a urine cath would add much as she does not make much urine at baseline.  Given a dose of IV pain meds here and she reports that she feels comfortable going home.                    ED Course as of 02/07/24 1605   Wed Feb 07, 2024   1340 Hemoglobin(!): 9.1  7.2 on 1/29/24 [AH]   1414 Lipase(!): 389  416 9 days ago [AH]   1435 Patient reports she is feeling much better following dose of pain and nausea medication.  She tells me she has been taking Tylenol and ibuprofen at home without alleviation of her discomfort and trying to eat small meals throughout the day.  We discussed dietary changes including protein drinks like boost that may help  with nutrition as she reports she is losing weight.  Offered admission which she declines.  Will send a short course of oral analgesics to our [facility] pharmacy here for patient to take and have her follow-up with gastroenterology, Dr. Perry as an outpatient.  All questions answered. []      ED Course User Index  [AH] Marj AndreCAESAR alcantara             AS OF 16:05 EST VITALS:    BP - (!) 144/108  HR - 109  TEMP - 98.2 °F (36.8 °C) (Oral)  O2 SATS - 100%    COMPLEXITY OF CARE  Admission was considered but after careful review of the patient's presentation, physical examination, diagnostic results, and response to treatment the patient may be safely discharged with outpatient follow-up.      DIAGNOSIS  Final diagnoses:   Abdominal pain, unspecified abdominal location   Acute pancreatitis, unspecified complication status, unspecified pancreatitis type   End-stage renal disease on hemodialysis         DISPOSITION  ED Disposition       ED Disposition   Discharge    Condition   Stable    Comment   --                Please note that portions of this document were completed with a voice recognition program.    Note Disclaimer: At [facility], we believe that sharing information builds trust and better relationships. You are receiving this note because you recently visited [facility]. It is possible you will see health information before a provider has talked with you about it. This kind of information can be easy to misunderstand. To help you fully understand what it means for your health, we urge you to discuss this note with your provider.         Lidia Andre APRN  02/07/24 1170

## 2024-02-08 RX ORDER — OXYCODONE HYDROCHLORIDE 5 MG/1
5 TABLET ORAL EVERY 6 HOURS PRN
OUTPATIENT
Start: 2024-02-08

## 2024-02-15 ENCOUNTER — READMISSION MANAGEMENT (OUTPATIENT)
Dept: CALL CENTER | Facility: HOSPITAL | Age: 34
End: 2024-02-15
Payer: MEDICARE

## 2024-02-17 PROBLEM — R01.1 HEART MURMUR: Status: ACTIVE | Noted: 2024-02-17

## 2024-02-17 PROBLEM — R10.10 UPPER ABDOMINAL PAIN: Status: ACTIVE | Noted: 2024-02-17

## 2024-02-23 RX ORDER — OXYCODONE HYDROCHLORIDE 5 MG/1
5 TABLET ORAL EVERY 6 HOURS PRN
OUTPATIENT
Start: 2024-02-23

## 2024-02-23 NOTE — TELEPHONE ENCOUNTER
Caller: JaviereDe    Relationship: Self    Best call back number: [phone]     Requested Prescriptions:   Requested Prescriptions     Pending Prescriptions Disp Refills    oxyCODONE (ROXICODONE) 5 MG immediate release tablet       Sig: Take 1 tablet by mouth Every 6 (Six) Hours As Needed for Severe Pain.        Pharmacy where request should be sent: [id number] [address] AT [facility] [phone] [facility] [phone]      Last office visit with prescribing clinician: 2/6/2024   Last telemedicine visit with prescribing clinician: Visit date not found   Next office visit with prescribing clinician: 4/8/2024         Does the patient have less than a 3 day supply:  [x] Yes  [] No    Would you like a call back once the refill request has been completed: [] Yes [x] No    If the office needs to give you a call back, can they leave a voicemail: [] Yes [x] No    Ara Lopez Rep   02/23/24 09:12 EST

## 2024-02-26 ENCOUNTER — TELEPHONE (OUTPATIENT)
Dept: GASTROENTEROLOGY | Facility: CLINIC | Age: 34
End: 2024-02-26
Payer: MEDICARE

## 2024-02-26 NOTE — TELEPHONE ENCOUNTER
Ok for hub to read and reschedule/schedule.   Called pt to schedule appt per pts my chart request, she already has appt with dr noland on April 24, it is the earliest date dr noland has. Call wouldn't ring through, couldn't leave vm.

## 2024-02-27 ENCOUNTER — OFFICE VISIT (OUTPATIENT)
Dept: FAMILY MEDICINE CLINIC | Facility: CLINIC | Age: 34
End: 2024-02-27
Payer: MEDICARE

## 2024-02-27 VITALS
BODY MASS INDEX: 16.33 KG/M2 | WEIGHT: 101.6 LBS | SYSTOLIC BLOOD PRESSURE: 138 MMHG | HEIGHT: 66 IN | DIASTOLIC BLOOD PRESSURE: 82 MMHG

## 2024-02-27 DIAGNOSIS — K85.90 ACUTE PANCREATITIS, UNSPECIFIED COMPLICATION STATUS, UNSPECIFIED PANCREATITIS TYPE: Primary | ICD-10-CM

## 2024-02-27 RX ORDER — HYDRALAZINE HYDROCHLORIDE 25 MG/1
25 TABLET, FILM COATED ORAL 3 TIMES DAILY
COMMUNITY
Start: 2024-02-15 | End: 2024-03-04 | Stop reason: HOSPADM

## 2024-02-27 NOTE — PROGRESS NOTES
"Subjective          Dee Herrera presents to [facility] for Abdominal Pain (Abdominal pain ), Back Pain, and Shoulder Pain (Pain all over her body pt stated )   History of Present Illness    She is here with complaint of pain from her waist up to her neck.  She reports this has been going on for about 3 weeks.   She denies having any specific area of pain and indicated \"pain is everywhere from waist to her neck\".  Her pain feels like stabbing/burning pain, pain level 8/10, pain level is constant.  Nothing makes her pain better.  Laying down, coughing, sneezing and anything that has to do with her stomach will worsen her pain.  She reports has been taking Oxycodone 5mg and then recently she went to [facility] ED and was given Oxycodone 10mg and was sent to me for pain control.  She does not eat because she will just throw it up.  She has tried to eat rice, bisquits, chicken noodle soup and seafood.  She had appointment with Dr. Kaminski on 2/12/24 and cancelled the appointment due to not having a ride.  She also cancelled appointment with me on 2/12/24.  Her next scheduled appointment with Dr. Kaminski is on 4/24/24.  During our office visit today, we called Dr. Kaminski's office and tried to reschedule her sooner than 4/24/24.  Dr. Kaminski's office was able to schedule her for 3/28/24 at 2:15 and she must arrive by 2pm.  Discussed at length that it is important to fix the problem causing the abdominal pain and the importance of following up with Dr. Kaminski so he can help her relieve her abdominal pain.  Discussed importance of avoiding fatty foods and ETOH intake in the meantime to help reduce abdominal pain (handouts provided).  Discussed referral to pain management but they will not treat generalized pain.  Discussed avoiding pain medication due to masking the problem and stressed that she must fix the problem that is causing her abdominal pain.       Review of Systems       Objective   Vital " "Signs:   /82 (BP Location: Right arm, Patient Position: Sitting, Cuff Size: Small Adult)   Ht 167.6 cm (65.98\")   Wt 46.1 kg (101 lb 9.6 oz)   BMI 16.41 kg/m²         Physical Exam  Vitals and nursing note reviewed.   Constitutional:       General: She is not in acute distress.     Appearance: She is not toxic-appearing or diaphoretic.      Comments: Chronically ill appearing   HENT:      Head: Normocephalic and atraumatic.   Cardiovascular:      Rate and Rhythm: Normal rate and regular rhythm.      Heart sounds: Normal heart sounds.   Pulmonary:      Effort: Pulmonary effort is normal. No respiratory distress.      Breath sounds: Normal breath sounds. No wheezing.   Abdominal:      General: Bowel sounds are normal. There is no distension.      Palpations: Abdomen is soft. There is no mass.      Tenderness: There is abdominal tenderness. There is no guarding or rebound.      Comments: Patient did not want to lay down on exam table for abdominal assessment due to discomfort.   Skin:     General: Skin is warm and dry.   Neurological:      Mental Status: She is alert.        Result Review :                 Assessment and Plan    Diagnoses and all orders for this visit:    1. Acute pancreatitis, unspecified complication status, unspecified pancreatitis type (Primary)  Comments:  Discussed dietary changes - see handout attached.  Stop ETOH use.  F/U with Dr. Kaminski, GI specialist.    Discussed red flags and when to seek Emergency Room medical treatment.        Follow Up   Return if symptoms worsen or fail to improve.  Patient was given instructions and counseling regarding her condition or for health maintenance advice. Please see specific information pulled into the AVS if appropriate.   "

## 2024-03-02 ENCOUNTER — HOSPITAL ENCOUNTER (INPATIENT)
Facility: HOSPITAL | Age: 34
LOS: 2 days | Discharge: HOME | DRG: 438 | End: 2024-03-04
Attending: INTERNAL MEDICINE | Admitting: INTERNAL MEDICINE
Payer: MEDICARE

## 2024-03-02 DIAGNOSIS — K85.90 ACUTE PANCREATITIS, UNSPECIFIED COMPLICATION STATUS, UNSPECIFIED PANCREATITIS TYPE: ICD-10-CM

## 2024-03-02 DIAGNOSIS — R10.9 ABDOMINAL PAIN, UNSPECIFIED ABDOMINAL LOCATION: ICD-10-CM

## 2024-03-02 LAB
ABO GROUP BLD: NORMAL
ALBUMIN SERPL-MCNC: 2.5 G/DL (ref 3.5–5.2)
ALBUMIN/GLOB SERPL: 0.7 G/DL
ALP SERPL-CCNC: 54 U/L (ref 39–117)
ALT SERPL W P-5'-P-CCNC: 9 U/L (ref 1–33)
ANION GAP SERPL CALCULATED.3IONS-SCNC: 11 MMOL/L (ref 5–15)
AST SERPL-CCNC: 21 U/L (ref 1–32)
BASOPHILS # BLD AUTO: 0.01 10*3/MM3 (ref 0–0.2)
BASOPHILS NFR BLD AUTO: 0.2 % (ref 0–1.5)
BILIRUB SERPL-MCNC: 0.5 MG/DL (ref 0–1.2)
BLD GP AB SCN SERPL QL: NEGATIVE
BUN SERPL-MCNC: 15 MG/DL (ref 6–20)
BUN/CREAT SERPL: 2.1 (ref 7–25)
CALCIUM SPEC-SCNC: 8.1 MG/DL (ref 8.6–10.5)
CHLORIDE SERPL-SCNC: 100 MMOL/L (ref 98–107)
CO2 SERPL-SCNC: 27 MMOL/L (ref 22–29)
CREAT SERPL-MCNC: 7 MG/DL (ref 0.57–1)
DEPRECATED RDW RBC AUTO: 47.6 FL (ref 37–54)
EGFRCR SERPLBLD CKD-EPI 2021: 7.5 ML/MIN/1.73
EOSINOPHIL # BLD AUTO: 0.05 10*3/MM3 (ref 0–0.4)
EOSINOPHIL NFR BLD AUTO: 1.1 % (ref 0.3–6.2)
ERYTHROCYTE [DISTWIDTH] IN BLOOD BY AUTOMATED COUNT: 18.2 % (ref 12.3–15.4)
GLOBULIN UR ELPH-MCNC: 3.6 GM/DL
GLUCOSE SERPL-MCNC: 83 MG/DL (ref 65–99)
HCT VFR BLD AUTO: 21.4 % (ref 34–46.6)
HGB BLD-MCNC: 6 G/DL (ref 12–15.9)
LIPASE SERPL-CCNC: 161 U/L (ref 13–60)
LYMPHOCYTES # BLD AUTO: 0.72 10*3/MM3 (ref 0.7–3.1)
LYMPHOCYTES NFR BLD AUTO: 15.6 % (ref 19.6–45.3)
MCH RBC QN AUTO: 21.9 PG (ref 26.6–33)
MCHC RBC AUTO-ENTMCNC: 28 G/DL (ref 31.5–35.7)
MCV RBC AUTO: 78.1 FL (ref 79–97)
MONOCYTES # BLD AUTO: 0.77 10*3/MM3 (ref 0.1–0.9)
MONOCYTES NFR BLD AUTO: 16.7 % (ref 5–12)
NEUTROPHILS NFR BLD AUTO: 3.05 10*3/MM3 (ref 1.7–7)
NEUTROPHILS NFR BLD AUTO: 66.2 % (ref 42.7–76)
PLATELET # BLD AUTO: 190 10*3/MM3 (ref 140–450)
POTASSIUM SERPL-SCNC: 3.6 MMOL/L (ref 3.5–5.2)
PROT SERPL-MCNC: 6.1 G/DL (ref 6–8.5)
RBC # BLD AUTO: 2.74 10*6/MM3 (ref 3.77–5.28)
RH BLD: POSITIVE
SODIUM SERPL-SCNC: 138 MMOL/L (ref 136–145)
T&S EXPIRATION DATE: NORMAL
WBC NRBC COR # BLD AUTO: 4.61 10*3/MM3 (ref 3.4–10.8)

## 2024-03-02 PROCEDURE — 86901 BLOOD TYPING SEROLOGIC RH(D): CPT | Performed by: INTERNAL MEDICINE

## 2024-03-02 PROCEDURE — 86235 NUCLEAR ANTIGEN ANTIBODY: CPT | Performed by: INTERNAL MEDICINE

## 2024-03-02 PROCEDURE — 86900 BLOOD TYPING SEROLOGIC ABO: CPT

## 2024-03-02 PROCEDURE — 86850 RBC ANTIBODY SCREEN: CPT | Performed by: INTERNAL MEDICINE

## 2024-03-02 PROCEDURE — 86225 DNA ANTIBODY NATIVE: CPT | Performed by: INTERNAL MEDICINE

## 2024-03-02 PROCEDURE — 99222 1ST HOSP IP/OBS MODERATE 55: CPT | Performed by: INTERNAL MEDICINE

## 2024-03-02 PROCEDURE — 86900 BLOOD TYPING SEROLOGIC ABO: CPT | Performed by: INTERNAL MEDICINE

## 2024-03-02 PROCEDURE — 82787 IGG 1 2 3 OR 4 EACH: CPT | Performed by: INTERNAL MEDICINE

## 2024-03-02 PROCEDURE — 5A1D70Z PERFORMANCE OF URINARY FILTRATION, INTERMITTENT, LESS THAN 6 HOURS PER DAY: ICD-10-PCS | Performed by: INTERNAL MEDICINE

## 2024-03-02 PROCEDURE — 83690 ASSAY OF LIPASE: CPT | Performed by: INTERNAL MEDICINE

## 2024-03-02 PROCEDURE — 86923 COMPATIBILITY TEST ELECTRIC: CPT

## 2024-03-02 PROCEDURE — 80053 COMPREHEN METABOLIC PANEL: CPT | Performed by: INTERNAL MEDICINE

## 2024-03-02 PROCEDURE — P9016 RBC LEUKOCYTES REDUCED: HCPCS

## 2024-03-02 PROCEDURE — 25010000002 HYDRALAZINE PER 20 MG: Performed by: INTERNAL MEDICINE

## 2024-03-02 PROCEDURE — 25010000002 HEPARIN (PORCINE) PER 1000 UNITS: Performed by: INTERNAL MEDICINE

## 2024-03-02 PROCEDURE — 36430 TRANSFUSION BLD/BLD COMPNT: CPT

## 2024-03-02 PROCEDURE — 85025 COMPLETE CBC W/AUTO DIFF WBC: CPT | Performed by: INTERNAL MEDICINE

## 2024-03-02 PROCEDURE — 63710000001 MYCOPHENOLATE MOFETIL PER 250 MG: Performed by: INTERNAL MEDICINE

## 2024-03-02 RX ORDER — SODIUM CHLORIDE 0.9 % (FLUSH) 0.9 %
10 SYRINGE (ML) INJECTION EVERY 12 HOURS SCHEDULED
Status: DISCONTINUED | OUTPATIENT
Start: 2024-03-02 | End: 2024-03-04 | Stop reason: HOSPADM

## 2024-03-02 RX ORDER — FOLIC ACID 1 MG/1
1 TABLET ORAL DAILY
Status: DISCONTINUED | OUTPATIENT
Start: 2024-03-02 | End: 2024-03-04 | Stop reason: HOSPADM

## 2024-03-02 RX ORDER — BISACODYL 5 MG/1
5 TABLET, DELAYED RELEASE ORAL DAILY PRN
Status: DISCONTINUED | OUTPATIENT
Start: 2024-03-02 | End: 2024-03-04 | Stop reason: HOSPADM

## 2024-03-02 RX ORDER — BISACODYL 10 MG
10 SUPPOSITORY, RECTAL RECTAL DAILY PRN
Status: DISCONTINUED | OUTPATIENT
Start: 2024-03-02 | End: 2024-03-04 | Stop reason: HOSPADM

## 2024-03-02 RX ORDER — SODIUM CHLORIDE 0.9 % (FLUSH) 0.9 %
10 SYRINGE (ML) INJECTION AS NEEDED
Status: DISCONTINUED | OUTPATIENT
Start: 2024-03-02 | End: 2024-03-04 | Stop reason: HOSPADM

## 2024-03-02 RX ORDER — HYDROXYCHLOROQUINE SULFATE 200 MG/1
200 TABLET, FILM COATED ORAL DAILY
Status: DISCONTINUED | OUTPATIENT
Start: 2024-03-02 | End: 2024-03-04 | Stop reason: HOSPADM

## 2024-03-02 RX ORDER — OXYCODONE HYDROCHLORIDE AND ACETAMINOPHEN 5; 325 MG/1; MG/1
1 TABLET ORAL EVERY 4 HOURS PRN
Status: DISCONTINUED | OUTPATIENT
Start: 2024-03-02 | End: 2024-03-04 | Stop reason: HOSPADM

## 2024-03-02 RX ORDER — ESCITALOPRAM OXALATE 10 MG/1
10 TABLET ORAL DAILY
Status: DISCONTINUED | OUTPATIENT
Start: 2024-03-02 | End: 2024-03-04 | Stop reason: HOSPADM

## 2024-03-02 RX ORDER — ONDANSETRON 2 MG/ML
4 INJECTION INTRAMUSCULAR; INTRAVENOUS EVERY 6 HOURS PRN
Status: DISCONTINUED | OUTPATIENT
Start: 2024-03-02 | End: 2024-03-04 | Stop reason: HOSPADM

## 2024-03-02 RX ORDER — CARVEDILOL 25 MG/1
25 TABLET ORAL EVERY 12 HOURS SCHEDULED
Status: DISCONTINUED | OUTPATIENT
Start: 2024-03-02 | End: 2024-03-04 | Stop reason: HOSPADM

## 2024-03-02 RX ORDER — POLYETHYLENE GLYCOL 3350 17 G/17G
17 POWDER, FOR SOLUTION ORAL DAILY PRN
Status: DISCONTINUED | OUTPATIENT
Start: 2024-03-02 | End: 2024-03-04 | Stop reason: HOSPADM

## 2024-03-02 RX ORDER — HYDROMORPHONE HYDROCHLORIDE 1 MG/ML
0.5 INJECTION, SOLUTION INTRAMUSCULAR; INTRAVENOUS; SUBCUTANEOUS EVERY 4 HOURS PRN
Status: DISCONTINUED | OUTPATIENT
Start: 2024-03-02 | End: 2024-03-04 | Stop reason: HOSPADM

## 2024-03-02 RX ORDER — ACETAMINOPHEN 325 MG/1
650 TABLET ORAL EVERY 4 HOURS PRN
Status: DISCONTINUED | OUTPATIENT
Start: 2024-03-02 | End: 2024-03-04 | Stop reason: HOSPADM

## 2024-03-02 RX ORDER — HYDRALAZINE HYDROCHLORIDE 20 MG/ML
10 INJECTION INTRAMUSCULAR; INTRAVENOUS EVERY 6 HOURS PRN
Status: DISCONTINUED | OUTPATIENT
Start: 2024-03-02 | End: 2024-03-04 | Stop reason: HOSPADM

## 2024-03-02 RX ORDER — ACETAMINOPHEN 160 MG/5ML
650 SOLUTION ORAL EVERY 4 HOURS PRN
Status: DISCONTINUED | OUTPATIENT
Start: 2024-03-02 | End: 2024-03-04 | Stop reason: HOSPADM

## 2024-03-02 RX ORDER — AMOXICILLIN 250 MG
2 CAPSULE ORAL 2 TIMES DAILY PRN
Status: DISCONTINUED | OUTPATIENT
Start: 2024-03-02 | End: 2024-03-04 | Stop reason: HOSPADM

## 2024-03-02 RX ORDER — HEPARIN SODIUM 1000 [USP'U]/ML
4000 INJECTION, SOLUTION INTRAVENOUS; SUBCUTANEOUS AS NEEDED
Status: DISCONTINUED | OUTPATIENT
Start: 2024-03-02 | End: 2024-03-04 | Stop reason: HOSPADM

## 2024-03-02 RX ORDER — SODIUM CHLORIDE 9 MG/ML
40 INJECTION, SOLUTION INTRAVENOUS AS NEEDED
Status: DISCONTINUED | OUTPATIENT
Start: 2024-03-02 | End: 2024-03-04 | Stop reason: HOSPADM

## 2024-03-02 RX ORDER — LACOSAMIDE 100 MG/1
100 TABLET ORAL EVERY 12 HOURS SCHEDULED
Status: DISCONTINUED | OUTPATIENT
Start: 2024-03-02 | End: 2024-03-04 | Stop reason: HOSPADM

## 2024-03-02 RX ORDER — ACETAMINOPHEN 650 MG/1
650 SUPPOSITORY RECTAL EVERY 4 HOURS PRN
Status: DISCONTINUED | OUTPATIENT
Start: 2024-03-02 | End: 2024-03-04 | Stop reason: HOSPADM

## 2024-03-02 RX ORDER — ASPIRIN 81 MG/1
81 TABLET, CHEWABLE ORAL DAILY
Status: DISCONTINUED | OUTPATIENT
Start: 2024-03-02 | End: 2024-03-04 | Stop reason: HOSPADM

## 2024-03-02 RX ORDER — MYCOPHENOLATE MOFETIL 250 MG/1
250 CAPSULE ORAL EVERY 12 HOURS SCHEDULED
Status: DISCONTINUED | OUTPATIENT
Start: 2024-03-02 | End: 2024-03-04 | Stop reason: HOSPADM

## 2024-03-02 RX ORDER — MORPHINE SULFATE 2 MG/ML
2 INJECTION, SOLUTION INTRAMUSCULAR; INTRAVENOUS EVERY 4 HOURS PRN
Status: DISCONTINUED | OUTPATIENT
Start: 2024-03-02 | End: 2024-03-02

## 2024-03-02 RX ORDER — LISINOPRIL 10 MG/1
10 TABLET ORAL
Status: DISCONTINUED | OUTPATIENT
Start: 2024-03-02 | End: 2024-03-03

## 2024-03-02 RX ORDER — HEPARIN SODIUM 5000 [USP'U]/ML
5000 INJECTION, SOLUTION INTRAVENOUS; SUBCUTANEOUS EVERY 8 HOURS SCHEDULED
Status: DISCONTINUED | OUTPATIENT
Start: 2024-03-02 | End: 2024-03-04

## 2024-03-02 RX ORDER — HYDRALAZINE HYDROCHLORIDE 25 MG/1
25 TABLET, FILM COATED ORAL 3 TIMES DAILY
Status: DISCONTINUED | OUTPATIENT
Start: 2024-03-02 | End: 2024-03-03

## 2024-03-02 RX ADMIN — OXYCODONE HYDROCHLORIDE AND ACETAMINOPHEN 1 TABLET: 5; 325 TABLET ORAL at 11:49

## 2024-03-02 RX ADMIN — MYCOPHENOLATE MOFETIL 250 MG: 250 CAPSULE ORAL at 13:19

## 2024-03-02 RX ADMIN — DICLOFENAC SODIUM 4 G: 10 GEL TOPICAL at 15:22

## 2024-03-02 RX ADMIN — Medication 10 ML: at 11:51

## 2024-03-02 RX ADMIN — HEPARIN SODIUM 5000 UNITS: 5000 INJECTION INTRAVENOUS; SUBCUTANEOUS at 13:19

## 2024-03-02 RX ADMIN — LACOSAMIDE 100 MG: 100 TABLET, FILM COATED ORAL at 11:49

## 2024-03-02 RX ADMIN — HYDRALAZINE HYDROCHLORIDE 25 MG: 25 TABLET ORAL at 15:21

## 2024-03-02 RX ADMIN — HEPARIN SODIUM 5000 UNITS: 5000 INJECTION INTRAVENOUS; SUBCUTANEOUS at 23:40

## 2024-03-02 RX ADMIN — HYDROXYCHLOROQUINE SULFATE 200 MG: 200 TABLET, FILM COATED ORAL at 13:19

## 2024-03-02 RX ADMIN — HYDRALAZINE HYDROCHLORIDE 10 MG: 20 INJECTION, SOLUTION INTRAMUSCULAR; INTRAVENOUS at 11:50

## 2024-03-02 RX ADMIN — ESCITALOPRAM OXALATE 10 MG: 10 TABLET, FILM COATED ORAL at 13:19

## 2024-03-02 RX ADMIN — HYDRALAZINE HYDROCHLORIDE 25 MG: 25 TABLET ORAL at 23:41

## 2024-03-02 RX ADMIN — CARVEDILOL 25 MG: 25 TABLET, FILM COATED ORAL at 23:41

## 2024-03-02 RX ADMIN — HEPARIN SODIUM 4000 UNITS: 1000 INJECTION INTRAVENOUS; SUBCUTANEOUS at 23:33

## 2024-03-02 RX ADMIN — LACOSAMIDE 100 MG: 100 TABLET, FILM COATED ORAL at 23:42

## 2024-03-02 RX ADMIN — LISINOPRIL 10 MG: 10 TABLET ORAL at 13:19

## 2024-03-02 RX ADMIN — ASPIRIN 81 MG: 81 TABLET, CHEWABLE ORAL at 11:49

## 2024-03-02 RX ADMIN — Medication 10 ML: at 23:43

## 2024-03-02 RX ADMIN — MYCOPHENOLATE MOFETIL 250 MG: 250 CAPSULE ORAL at 23:41

## 2024-03-02 RX ADMIN — FOLIC ACID 1 MG: 1 TABLET ORAL at 13:19

## 2024-03-02 RX ADMIN — CARVEDILOL 25 MG: 25 TABLET, FILM COATED ORAL at 11:49

## 2024-03-02 NOTE — H&P
[facility] Admission H&P    Patient Care Team:  Margarita Woods APRN as PCP - General (Nurse Practitioner)  Winnie Sanchez MD as Referring Physician (Obstetrics and Gynecology)  Norberto Almaraz MD PhD as Consulting Physician (Hematology and Oncology)  Lupis Thomas MD (Inactive) as Consulting Physician (Nephrology)  Hair Mckeon MD as Consulting Physician (Nephrology)  Juana Taylor MD as Consulting Physician (Cardiology)    Chief complaint: Abdominal pain, vomiting    History of Present Illness    This is a 33-year-old female with history of lupus, end-stage renal disease, pancreatitis amongst numerous other medical issues as outlined below who presented to an outside emergency room with complaints of abdominal pain and vomiting.  She was found to have acute pancreatitis with peripancreatic fluid collections.  She was admitted at this facility back in January for similar presentation.  Presently she states her abdominal pain has improved since her initial presentation to the emergency room.  She denies any additional vomiting and has no nausea.  She is asking if she can eat.  She denies any fevers or chills.    Past Medical History:   Diagnosis Date    Anasarca     PER CT SCAN    Anxiety     CHF (congestive heart failure)     Dry skin     ESRD (end stage renal disease) on dialysis     [facility]    History of abdominal pain     History of anemia     History of transfusion     Hypertension     Iron deficiency anemia 09/27/2021    Lupus (systemic lupus erythematosus) 07/30/2022    Migraine     Other specified nutritional anemias     Pancreatitis     Pericardial effusion     Renal insufficiency     Scoliosis     Seizures     STATES LAST WAS 1/2023    Shortness of breath     OCCASIONAL    Vitamin D deficiency 09/27/2021     Past Surgical History:   Procedure Laterality Date    ASCENDING AORTIC ANEURYSM REPAIR W/ MECHANICAL AORTIC VALVE REPLACEMENT N/A 11/2/2023    Procedure:  CHETNA STERNOTOMY, AORTIC ROOT REPLACEMENT WITH VALVE SPARING MISHEL PROCEDURE, REPLACEMENT OF ASCENDING AORTA, RIGHT FEMORAL DIALYSIS CATHETER PLACEMENT AND PRP;  Surgeon: Chris Medina MD;  Location: [facility];  Service: Cardiothoracic;  Laterality: N/A;    CARDIAC CATHETERIZATION N/A 06/14/2023    Procedure: Coronary angiography;  Surgeon: Juana Taylor MD;  Location: [facility] CATH INVASIVE LOCATION;  Service: Cardiovascular;  Laterality: N/A;    CARDIAC CATHETERIZATION N/A 06/14/2023    Procedure: Left heart cath;  Surgeon: Juana Taylor MD;  Location: [facility] CATH INVASIVE LOCATION;  Service: Cardiovascular;  Laterality: N/A;    CARDIAC CATHETERIZATION N/A 06/14/2023    Procedure: Right Heart Cath;  Surgeon: Juana Taylor MD;  Location: [facility] CATH INVASIVE LOCATION;  Service: Cardiovascular;  Laterality: N/A;    COLONOSCOPY N/A 7/20/2023    Procedure: COLONOSCOPY to cecum with biopsy;  Surgeon: Drew Kaminski MD;  Location: [facility] ENDOSCOPY;  Service: Gastroenterology;  Laterality: N/A;  PRE - diarrhea, constipation  POST - fair prep, normal    CORONARY ARTERY BYPASS GRAFT N/A 11/6/2023    Procedure: STERNAL EXPLORATION AND WASH OUT;  Surgeon: Jr Mitesh Quiroz MD;  Location: [facility];  Service: Cardiothoracic;  Laterality: N/A;    ENDOSCOPY N/A 7/20/2023    Procedure: ESOPHAGOGASTRODUODENOSCOPY with biopsy;  Surgeon: Drew Kaminski MD;  Location: [facility] ENDOSCOPY;  Service: Gastroenterology;  Laterality: N/A;  PRE - abn ct abd  POST - gastritis    INSERTION HEMODIALYSIS CATHETER N/A 07/26/2022    Procedure: RIGHT TUNNELED DIALYSIS CATHETER PLACEMENT;  Surgeon: Diandra Adhikari MD;  Location: [facility] MAIN OR;  Service: Vascular;  Laterality: N/A;    INSERTION HEMODIALYSIS CATHETER Left 11/29/2023    Procedure: TUNNELED DIALYSIS CATHETER PLACEMENT;  Surgeon: Jose Patel II, MD;  Location: [facility] MAIN OR;  Service: Vascular;  Laterality: Left;    INSERTION PERITONEAL DIALYSIS CATHETER N/A  04/03/2023    Procedure: INSERTION PERITONEAL DIALYSIS CATHETER LAPAROSCOPIC, omentumpexy;  Surgeon: Jemal Loyola MD;  Location: [facility] MAIN OR;  Service: General;  Laterality: N/A;    REMOVAL PERITONEAL DIALYSIS CATHETER N/A 12/1/2023    Procedure: REMOVAL PERITONEAL DIALYSIS CATHETER;  Surgeon: Maryellen Ashton MD;  Location:  [facility];  Service: General;  Laterality: N/A;    TONSILLECTOMY       Family History   Problem Relation Age of Onset    Autoimmune disease Mother     Anemia Mother     Diabetes Sister     Anemia Brother     Diabetes Maternal Grandmother     Hypertension Maternal Grandmother     Cancer Maternal Grandmother     Sickle cell anemia Cousin     Malig Hyperthermia Neg Hx      Social History     Tobacco Use    Smoking status: Some Days     Types: Cigars     Passive exposure: Past    Smokeless tobacco: Never    Tobacco comments:     Patient smoked black & mild   Vaping Use    Vaping status: Never Used   Substance Use Topics    Alcohol use: Yes     Alcohol/week: 8.0 standard drinks of alcohol     Types: 2 Glasses of wine, 4 Shots of liquor, 2 Drinks containing 0.5 oz of alcohol per week     Comment: social    Drug use: Yes     Types: Marijuana, Oxycodone     Comment: OCCASIONAL     Medications Prior to Admission   Medication Sig Dispense Refill Last Dose    carvedilol (COREG) 25 MG tablet Take 1 tablet by mouth Every 12 (Twelve) Hours. 60 tablet 0 3/1/2024    Diclofenac Sodium (VOLTAREN) 1 % gel gel Apply 4 g topically to the appropriate area as directed 3 (Three) Times a Day. 150 g 0 3/1/2024    escitalopram (LEXAPRO) 10 MG tablet Take 1 tablet by mouth Daily.   3/1/2024    folic acid (FOLVITE) 1 MG tablet Take 1 tablet by mouth Daily. 90 tablet 1 3/1/2024    hydrALAZINE (APRESOLINE) 25 MG tablet Take 1 tablet by mouth 3 (Three) Times a Day.   3/1/2024    lacosamide (VIMPAT) 100 MG tablet tablet Take 1 tablet by mouth Every 12 (Twelve) Hours. 6 tablet 0 3/1/2024    lisinopril  (PRINIVIL,ZESTRIL) 10 MG tablet Take 1 tablet by mouth Daily.   3/1/2024    mycophenolate (CELLCEPT) 500 MG tablet Take 0.5 tablets by mouth Every 12 (Twelve) Hours. 30 tablet 0 3/1/2024    ondansetron (Zofran) 4 MG tablet Take 1 tablet by mouth Every 8 (Eight) Hours As Needed for Nausea or Vomiting. 30 tablet 1 3/2/2024    ondansetron ODT (ZOFRAN-ODT) 4 MG disintegrating tablet Place 1 tablet on the tongue Every 8 (Eight) Hours As Needed for Vomiting or Nausea. 30 tablet 0 3/2/2024    oxyCODONE-acetaminophen (PERCOCET) 5-325 MG per tablet Take 1 tablet by mouth Every 4 (Four) Hours As Needed for Moderate Pain. 12 tablet 0 3/1/2024    polyethylene glycol (MIRALAX) 17 GM/SCOOP powder Take 17 g by mouth As Needed.   Past Week    aspirin 81 MG chewable tablet Chew 1 tablet Daily.       hydroxychloroquine (PLAQUENIL) 200 MG tablet Take 1 tablet by mouth Daily.   2024     Allergies:  Minoxidil    Review of Systems   Constitutional:  Negative for chills and fever.   HENT:  Negative for congestion and sore throat.    Eyes:  Negative for visual disturbance.   Respiratory:  Negative for cough, chest tightness, shortness of breath and wheezing.    Cardiovascular:  Negative for chest pain, palpitations and leg swelling.   Gastrointestinal:  Positive for abdominal pain. Negative for abdominal distention, diarrhea, nausea and vomiting.   Endocrine: Negative for polydipsia and polyuria.   Genitourinary:  Negative for difficulty urinating, dysuria, frequency and urgency.   Musculoskeletal:  Negative for arthralgias and myalgias.   Skin:  Negative for color change and rash.   Neurological:  Negative for dizziness and light-headedness.        PHYSICAL EXAM    Vital Signs  tMax 24 hrs:  Temp (24hrs), Av.3 °F (37.4 °C), Min:99.3 °F (37.4 °C), Max:99.3 °F (37.4 °C)    Vitals Ranges:  Temp:  [99.3 °F (37.4 °C)] 99.3 °F (37.4 °C)  Heart Rate:  [98] 98  Resp:  [18] 18  BP: (164-165)/(125-127) 165/125    Physical Exam  Vitals and  nursing note reviewed.   Constitutional:       General: She is not in acute distress.  HENT:      Head: Normocephalic and atraumatic.   Eyes:      Extraocular Movements: Extraocular movements intact.      Pupils: Pupils are equal, round, and reactive to light.   Cardiovascular:      Rate and Rhythm: Normal rate and regular rhythm.   Pulmonary:      Effort: Pulmonary effort is normal. No respiratory distress.      Breath sounds: Normal breath sounds.   Abdominal:      General: There is no distension.      Palpations: Abdomen is soft.      Tenderness: There is no abdominal tenderness.   Musculoskeletal:         General: No swelling or deformity.      Cervical back: Normal range of motion.   Skin:     General: Skin is warm and dry.   Neurological:      General: No focal deficit present.      Mental Status: She is alert and oriented to person, place, and time.         Results Review:    Lab work and CT scan from outside emergency room reviewed    CT scan of the abdomen and pelvis on 3/1/2024:  1.  Mild inflammatory stranding adjacent to the pancreas could represent interstitial edematous pancreatitis.2.  When compared to the prior exam on 01/23/2024, small to moderate volume ascites has worsened from prior with suspected early/developing loculation of the ascites. Additionally, there are new peripancreatic fluid collections along the greater curvature of the stomach and between the lesser curvature of the stomach and the pancreatic tail as above. Other peripancreatic fluid collection has decreased in size from prior exam.3.  Mild peritoneal thickening may be related to be the loculating fluid or peritonitis.4.  Marked gastric wall thickening suggestive of gastritis.5.  Cardiomegaly and small left pleural effusion.      I reviewed the patient's new clinical results.  I reviewed the patient's new imaging results and agree with the interpretation.        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       Assessment & Plan    The patient has been directly admitted from the outside emergency room.  At present she reports improvement of her abdominal pain and is asking if she can eat.  Prior to doing so we will ask gastroenterology to evaluate.  Allow sips with oral medications.  Restart her antihypertensives given uncontrolled blood pressure presently.  Initiate pain control regimen.  Consult nephrology for her end-stage renal disease.  States her last dialysis session was on Thursday.  Check CBC, CMP, lipase.  Additional plans based on her clinical course.    I discussed the patients findings and my recommendations with patient and nursing staff      Bobby Carter MD  03/02/24  11:19 EST

## 2024-03-02 NOTE — NURSING NOTE
Called dialysis twice, they states pt is on the list to do dialysis today, they can't estimate arrival time, blood is ready in blood bank.

## 2024-03-02 NOTE — CONSULTS
Gastroenterology   Initial Inpatient Consult Note    Referring Provider: Bobby Carter    Reason for Consultation: pancreatitis, anemia    Subjective     History of present illness:    33 y.o. female patient of Dr. Drew Kaminski known to our service.  Last in the hospital January 27, 2024.    Patient has a history of end-stage renal disease on hemodialysis.  This was her first episode in January 2024.  During that time her ultrasound of the abdomen was negative right upper quadrant as was her triglyceride level    History of alcohol in the distant past.    Lipase elevated at 161    This admission patient endorses complaints of abdominal pain and vomiting  CT Abdomen Pelvis Without Contrast (03/01/2024 16:02) mild inflammatory stranding adjacent to the pancreas.  When compared to the prior exam some ascites have increased with suspected early developing loculation of the ascites.  New peripancreatic fluid collections along the greater curve of the stomach and between the lesser curve of the stomach and the pancreatic tail as above other peripancreatic fluid collections has decreased in size from the prior exam mild peritoneal thickening may be related to the loculating fluid or peritonitis marked gastric wall thickening suggestive of gastritis    COLONOSCOPY (07/20/2023 16:39) nl  UPPER GI ENDOSCOPY (07/20/2023 16:38) gastritis    Patient is status post aortic dissection and repair, has end-stage renal disease and chronic anemia    Her white count is normal 4.61 but her hemoglobin is 6.0.  It was 6.3 a month ago but it was 9.13 weeks ago    Patient reports feeling good.  She would like to eat.  She has no vomiting since yesterday.  She has not seen any visible blood    Past Medical History:  Past Medical History:   Diagnosis Date    Anasarca     PER CT SCAN    Anxiety     CHF (congestive heart failure)     Dry skin     ESRD (end stage renal disease) on dialysis     MARCO COLE, KATIE FRASER    History of  abdominal pain     History of anemia     History of transfusion     Hypertension     Iron deficiency anemia 09/27/2021    Lupus (systemic lupus erythematosus) 07/30/2022    Migraine     Other specified nutritional anemias     Pancreatitis     Pericardial effusion     Renal insufficiency     Scoliosis     Seizures     STATES LAST WAS 1/2023    Shortness of breath     OCCASIONAL    Vitamin D deficiency 09/27/2021     Past Surgical History:  Past Surgical History:   Procedure Laterality Date    ASCENDING AORTIC ANEURYSM REPAIR W/ MECHANICAL AORTIC VALVE REPLACEMENT N/A 11/2/2023    Procedure: CHETNA STERNOTOMY, AORTIC ROOT REPLACEMENT WITH VALVE SPARING MISHEL PROCEDURE, REPLACEMENT OF ASCENDING AORTA, RIGHT FEMORAL DIALYSIS CATHETER PLACEMENT AND PRP;  Surgeon: Chris Medina MD;  Location: [facility] CVOR;  Service: Cardiothoracic;  Laterality: N/A;    CARDIAC CATHETERIZATION N/A 06/14/2023    Procedure: Coronary angiography;  Surgeon: Juana Taylor MD;  Location: [facility] CATH INVASIVE LOCATION;  Service: Cardiovascular;  Laterality: N/A;    CARDIAC CATHETERIZATION N/A 06/14/2023    Procedure: Left heart cath;  Surgeon: Juana Taylor MD;  Location: [facility] CATH INVASIVE LOCATION;  Service: Cardiovascular;  Laterality: N/A;    CARDIAC CATHETERIZATION N/A 06/14/2023    Procedure: Right Heart Cath;  Surgeon: Juana Taylor MD;  Location: [facility] CATH INVASIVE LOCATION;  Service: Cardiovascular;  Laterality: N/A;    COLONOSCOPY N/A 7/20/2023    Procedure: COLONOSCOPY to cecum with biopsy;  Surgeon: Drew Kaminski MD;  Location: [facility] ENDOSCOPY;  Service: Gastroenterology;  Laterality: N/A;  PRE - diarrhea, constipation  POST - fair prep, normal    CORONARY ARTERY BYPASS GRAFT N/A 11/6/2023    Procedure: STERNAL EXPLORATION AND WASH OUT;  Surgeon: Jr Mitesh Quiroz MD;  Location: [facility] CV[facility];  Service: Cardiothoracic;  Laterality: N/A;    ENDOSCOPY N/A 7/20/2023    Procedure: ESOPHAGOGASTRODUODENOSCOPY with biopsy;   Surgeon: Drew Kaminski MD;  Location: [facility] ENDOSCOPY;  Service: Gastroenterology;  Laterality: N/A;  PRE - abn ct abd  POST - gastritis    INSERTION HEMODIALYSIS CATHETER N/A 07/26/2022    Procedure: RIGHT TUNNELED DIALYSIS CATHETER PLACEMENT;  Surgeon: Diandra Adhikari MD;  Location: [facility] MAIN OR;  Service: Vascular;  Laterality: N/A;    INSERTION HEMODIALYSIS CATHETER Left 11/29/2023    Procedure: TUNNELED DIALYSIS CATHETER PLACEMENT;  Surgeon: Jose Patel II, MD;  Location: [facility] MAIN OR;  Service: Vascular;  Laterality: Left;    INSERTION PERITONEAL DIALYSIS CATHETER N/A 04/03/2023    Procedure: INSERTION PERITONEAL DIALYSIS CATHETER LAPAROSCOPIC, omentumpexy;  Surgeon: Jemal Loyola MD;  Location: [facility] MAIN OR;  Service: General;  Laterality: N/A;    REMOVAL PERITONEAL DIALYSIS CATHETER N/A 12/1/2023    Procedure: REMOVAL PERITONEAL DIALYSIS CATHETER;  Surgeon: Maryellen Ashton MD;  Location: [facility] MAIN OR;  Service: General;  Laterality: N/A;    TONSILLECTOMY        Social History:   Social History     Tobacco Use    Smoking status: Some Days     Types: Cigars     Passive exposure: Past    Smokeless tobacco: Never    Tobacco comments:     Patient smoked black & mild   Substance Use Topics    Alcohol use: Yes     Alcohol/week: 8.0 standard drinks of alcohol     Types: 2 Glasses of wine, 4 Shots of liquor, 2 Drinks containing 0.5 oz of alcohol per week     Comment: social      Family History:  Family History   Problem Relation Age of Onset    Autoimmune disease Mother     Anemia Mother     Diabetes Sister     Anemia Brother     Diabetes Maternal Grandmother     Hypertension Maternal Grandmother     Cancer Maternal Grandmother     Sickle cell anemia Cousin     Malig Hyperthermia Neg Hx        Home Meds:  Medications Prior to Admission   Medication Sig Dispense Refill Last Dose    carvedilol (COREG) 25 MG tablet Take 1 tablet by mouth Every 12 (Twelve) Hours. 60 tablet 0 3/1/2024     Diclofenac Sodium (VOLTAREN) 1 % gel gel Apply 4 g topically to the appropriate area as directed 3 (Three) Times a Day. 150 g 0 3/1/2024    escitalopram (LEXAPRO) 10 MG tablet Take 1 tablet by mouth Daily.   3/1/2024    folic acid (FOLVITE) 1 MG tablet Take 1 tablet by mouth Daily. 90 tablet 1 3/1/2024    hydrALAZINE (APRESOLINE) 25 MG tablet Take 1 tablet by mouth 3 (Three) Times a Day.   3/1/2024    lacosamide (VIMPAT) 100 MG tablet tablet Take 1 tablet by mouth Every 12 (Twelve) Hours. 6 tablet 0 3/1/2024    lisinopril (PRINIVIL,ZESTRIL) 10 MG tablet Take 1 tablet by mouth Daily.   3/1/2024    mycophenolate (CELLCEPT) 500 MG tablet Take 0.5 tablets by mouth Every 12 (Twelve) Hours. 30 tablet 0 3/1/2024    ondansetron (Zofran) 4 MG tablet Take 1 tablet by mouth Every 8 (Eight) Hours As Needed for Nausea or Vomiting. 30 tablet 1 3/2/2024    ondansetron ODT (ZOFRAN-ODT) 4 MG disintegrating tablet Place 1 tablet on the tongue Every 8 (Eight) Hours As Needed for Vomiting or Nausea. 30 tablet 0 3/2/2024    oxyCODONE-acetaminophen (PERCOCET) 5-325 MG per tablet Take 1 tablet by mouth Every 4 (Four) Hours As Needed for Moderate Pain. 12 tablet 0 3/1/2024    polyethylene glycol (MIRALAX) 17 GM/SCOOP powder Take 17 g by mouth As Needed.   Past Week    aspirin 81 MG chewable tablet Chew 1 tablet Daily.       hydroxychloroquine (PLAQUENIL) 200 MG tablet Take 1 tablet by mouth Daily.   2/29/2024     Current Meds:   aspirin, 81 mg, Oral, Daily  carvedilol, 25 mg, Oral, Q12H  Diclofenac Sodium, 4 g, Topical, TID  escitalopram, 10 mg, Oral, Daily  folic acid, 1 mg, Oral, Daily  heparin (porcine), 5,000 Units, Subcutaneous, Q8H  hydrALAZINE, 25 mg, Oral, TID  hydroxychloroquine, 200 mg, Oral, Daily  lacosamide, 100 mg, Oral, Q12H  lisinopril, 10 mg, Oral, Q24H  mycophenolate, 250 mg, Oral, Q12H  sodium chloride, 10 mL, Intravenous, Q12H      Allergies:  Allergies   Allergen Reactions    Minoxidil Hives and Other (See Comments)     " Pericardial effusion .     Review of Systems  Pertinent items are noted in HPI, all other systems reviewed and negative    Objective     Vital Signs  Temp:  [99.3 °F (37.4 °C)] 99.3 °F (37.4 °C)  Heart Rate:  [98] 98  Resp:  [18] 18  BP: (164-165)/(125-127) 165/125    Physical Exam:  CONSTITUTIONAL:  today's vital signs reviewed, chronically ill-appearing  EARS NOSE THROAT: trachea midline and no deformity of the nares  EYES: no scleral icterus  GASTROINTESTINAL: abdomen is soft, nontender, nondistended with normal active bowel sounds, no masses are appreciated  PSYCHIATRIC: appropriate mood and affect  RESPIRATORY: normal inspiratory effort with no increased work of breathing  NEUROLOGIC: patient is awake and alert  DERMATOLOGIC: skin is warm with no cyanosis  LYMPHATIC: no periumbilical lymphadenopathy     Results Review:              I reviewed the patient's new clinical results.              Invalid input(s): \"LABALBU\", \"PROT\"      Lab Results   Lab Value Date/Time    LIPASE 141 (H) 03/01/2024 1137    LIPASE 389 (H) 02/07/2024 1259    LIPASE 416 (H) 01/29/2024 0413    LIPASE 194 (H) 01/28/2024 0408    LIPASE 189 (H) 01/27/2024 0018    LIPASE 295 (H) 01/26/2024 1654    LIPASE 42 10/26/2023 0054    LIPASE 10 (L) 09/10/2023 1901    LIPASE 22 02/15/2023 0343    LIPASE 22 02/14/2023 2355    LIPASE 47 11/25/2022 2109    LIPASE 27 11/22/2022 1059    LIPASE 32 11/21/2022 2030       Radiology:  No orders to display       Assessment & Plan   Active Hospital Problems    Diagnosis     **Acute pancreatitis     ESRD (end stage renal disease)     Severe aortic valve regurgitation     Chronic diastolic CHF (congestive heart failure)     Lupus nephritis, ISN/RPS class IV     Systemic lupus erythematosus     Hypertension secondary to other renal disorders     Pancytopenia        Assessment:  Pancreatitis.  This is the second episode in 3 months.  Patient is already starting to feel better.  Minimally elevated lipase however " there are some new fluid collections.  Pancreatic lesion along the greater curve of the stomach and between the lesser curve of the stomach and the pancreatic tail.  Last admission triglycerides were normal as was right upper quadrant ultrasound  Distant alcohol none recent  Chronic anemia    Plan:  Supportive therapy with IV fluids, antiemetics and pain control as appropriate  Patient is feeling better would like to start a low-fat diet  Need to monitor these fluid collections for pseudocyst formation or abscess  Continue to look for underlying causes of pancreatitis.  Calcium is 8.1 will review her medications and check an GIANFRANCO and IgG4  Will need follow-up with Dr. Kaminski after discharge for surveillance imaging of the fluid collections  Transfuse possibly during dialysis discussed with the nursing staff  EGD and colonoscopy 1 year ago.  However, possible gastritis on EGD May need to consider EGD this admission given the patient's degree of anemia      I discussed the patients findings and my recommendations with patient and primary care team.           Vince Pittman M.D.  [facility] [place] [address]  Office: [phone]

## 2024-03-02 NOTE — PLAN OF CARE
Goal Outcome Evaluation:         Pt Alert, oriented x4, room air, sinus rhythm, VSS, afebrile. C/o mild pain to upper abdomen, PRN percocet given and effective. GI consulted today and okay to start low fat diet. Nephrology evaluated patient, new order for HD today, pt is schedule Tuesday, Thursday and Saturday. Hemoglobin 6.0, new order for blood transfusion 2 PRBCs during dialysis per Dr. Bingham.

## 2024-03-02 NOTE — CONSULTS
[facility] Consult Note      Patient Name: Dee Herrera  : 1990  MRN: [id number]  Primary Care Physician:  Margarita Woods APRN  Referring Physician: No Known Provider  Date of admission: 3/2/2024    Subjective     Reason for Consult:  ESRD     HPI:   Dee Herrera is a 33 y.o. female with ESRD (s/p PD in past, now IHD TTS via [facility] at [facility] unit), SLE on IS (cellcept, plaquenil), HTN, seizure disorder, PVD s/p ascending aortic aneurysm repair who presented to outside ER with abd pain, n/v.  Found to have acute pancreatitis on CT with peripancreatic fluid collections and small to mod ascites. Last dialyzed on THURS.  Severely anemic, hgb 6.0.  Denies melena but recalls low hgb at dialysis lately.  Lipase is 160.  Albumin low 2.5.  K/HCo3 normal 3.6 and BUN/Cr 15/7.0.  She was hosp  to 24 here for pancreatitis, etiol of which was unclear.  Denies ETOH use/abuse.  GI has seen her and has approved diet, and she's asking to eat.  Denies dyspnea.  She apparently has refused to have AVF placed, does not provide rationale.      Review of Systems:   14 point review of systems is otherwise negative except for mentioned above on HPI    Personal History     Past Medical History:   Diagnosis Date    Anasarca     PER CT SCAN    Anxiety     CHF (congestive heart failure)     Dry skin     ESRD (end stage renal disease) on dialysis     TUES, THURS, [facility]    History of abdominal pain     History of anemia     History of transfusion     Hypertension     Iron deficiency anemia 2021    Lupus (systemic lupus erythematosus) 2022    Migraine     Other specified nutritional anemias     Pancreatitis     Pericardial effusion     Renal insufficiency     Scoliosis     Seizures     STATES LAST WAS 2023    Shortness of breath     OCCASIONAL    Vitamin D deficiency 2021       Past Surgical History:   Procedure Laterality Date    ASCENDING AORTIC  ANEURYSM REPAIR W/ MECHANICAL AORTIC VALVE REPLACEMENT N/A 11/2/2023    Procedure: CHETNA STERNOTOMY, AORTIC ROOT REPLACEMENT WITH VALVE SPARING MISHEL PROCEDURE, REPLACEMENT OF ASCENDING AORTA, RIGHT FEMORAL DIALYSIS CATHETER PLACEMENT AND PRP;  Surgeon: Chris Medina MD;  Location: [facility] CVOR;  Service: Cardiothoracic;  Laterality: N/A;    CARDIAC CATHETERIZATION N/A 06/14/2023    Procedure: Coronary angiography;  Surgeon: Juana Taylor MD;  Location: [facility] CATH INVASIVE LOCATION;  Service: Cardiovascular;  Laterality: N/A;    CARDIAC CATHETERIZATION N/A 06/14/2023    Procedure: Left heart cath;  Surgeon: Juana Taylor MD;  Location: [facility] CATH INVASIVE LOCATION;  Service: Cardiovascular;  Laterality: N/A;    CARDIAC CATHETERIZATION N/A 06/14/2023    Procedure: Right Heart Cath;  Surgeon: Juana Taylor MD;  Location: [facility] CATH INVASIVE LOCATION;  Service: Cardiovascular;  Laterality: N/A;    COLONOSCOPY N/A 7/20/2023    Procedure: COLONOSCOPY to cecum with biopsy;  Surgeon: Drew Kaminski MD;  Location: [facility] ENDOSCOPY;  Service: Gastroenterology;  Laterality: N/A;  PRE - diarrhea, constipation  POST - fair prep, normal    CORONARY ARTERY BYPASS GRAFT N/A 11/6/2023    Procedure: STERNAL EXPLORATION AND WASH OUT;  Surgeon: Jr Mitesh Quiroz MD;  Location: [facility] CVOR;  Service: Cardiothoracic;  Laterality: N/A;    ENDOSCOPY N/A 7/20/2023    Procedure: ESOPHAGOGASTRODUODENOSCOPY with biopsy;  Surgeon: Drew Kaminski MD;  Location: [facility] ENDOSCOPY;  Service: Gastroenterology;  Laterality: N/A;  PRE - abn ct abd  POST - gastritis    INSERTION HEMODIALYSIS CATHETER N/A 07/26/2022    Procedure: RIGHT TUNNELED DIALYSIS CATHETER PLACEMENT;  Surgeon: Diandra Adhikari MD;  Location: [facility] MAIN OR;  Service: Vascular;  Laterality: N/A;    INSERTION HEMODIALYSIS CATHETER Left 11/29/2023    Procedure: TUNNELED DIALYSIS CATHETER PLACEMENT;  Surgeon: Jose Patel II, MD;  Location: [facility] MAIN OR;   Service: Vascular;  Laterality: Left;    INSERTION PERITONEAL DIALYSIS CATHETER N/A 04/03/2023    Procedure: INSERTION PERITONEAL DIALYSIS CATHETER LAPAROSCOPIC, omentumpexy;  Surgeon: Jemal Loyola MD;  Location: [facility] MAIN OR;  Service: General;  Laterality: N/A;    REMOVAL PERITONEAL DIALYSIS CATHETER N/A 12/1/2023    Procedure: REMOVAL PERITONEAL DIALYSIS CATHETER;  Surgeon: Maryellen Ashton MD;  Location: [facility] MAIN OR;  Service: General;  Laterality: N/A;    TONSILLECTOMY         Family History: family history includes Anemia in her brother and mother; Autoimmune disease in her mother; Cancer in her maternal grandmother; Diabetes in her maternal grandmother and sister; Hypertension in her maternal grandmother; Sickle cell anemia in her cousin.    Social History:  reports that she has been smoking cigars. She has been exposed to tobacco smoke. She has never used smokeless tobacco. She reports current alcohol use of about 8.0 standard drinks of alcohol per week. She reports current drug use. Drugs: Marijuana and Oxycodone.    Home Medications:  Prior to Admission medications    Medication Sig Start Date End Date Taking? Authorizing Provider   carvedilol (COREG) 25 MG tablet Take 1 tablet by mouth Every 12 (Twelve) Hours. 2/18/23  Yes Hernan Corcoran,    Diclofenac Sodium (VOLTAREN) 1 % gel gel Apply 4 g topically to the appropriate area as directed 3 (Three) Times a Day. 2/6/24  Yes Margarita Woods APRN   escitalopram (LEXAPRO) 10 MG tablet Take 1 tablet by mouth Daily. 12/10/23  Yes Bobby Caretr MD   folic acid (FOLVITE) 1 MG tablet Take 1 tablet by mouth Daily. 1/10/24  Yes Margarita Woods APRN   hydrALAZINE (APRESOLINE) 25 MG tablet Take 1 tablet by mouth 3 (Three) Times a Day. 2/15/24  Yes ProviderIvet MD   lacosamide (VIMPAT) 100 MG tablet tablet Take 1 tablet by mouth Every 12 (Twelve) Hours. 12/9/23  Yes Bobby Carter MD   lisinopril  (PRINIVIL,ZESTRIL) 10 MG tablet Take 1 tablet by mouth Daily. 12/10/23  Yes Bobby Carter MD   mycophenolate (CELLCEPT) 500 MG tablet Take 0.5 tablets by mouth Every 12 (Twelve) Hours. 2/18/23  Yes Hernan Corcoran DO   ondansetron (Zofran) 4 MG tablet Take 1 tablet by mouth Every 8 (Eight) Hours As Needed for Nausea or Vomiting. 4/3/23 4/2/24 Yes Jemal Loyola MD   ondansetron ODT (ZOFRAN-ODT) 4 MG disintegrating tablet Place 1 tablet on the tongue Every 8 (Eight) Hours As Needed for Vomiting or Nausea. 2/7/24  Yes Lidia Andre APRN   oxyCODONE-acetaminophen (PERCOCET) 5-325 MG per tablet Take 1 tablet by mouth Every 4 (Four) Hours As Needed for Moderate Pain. 2/7/24  Yes Lidia Andre APRN   polyethylene glycol (MIRALAX) 17 GM/SCOOP powder Take 17 g by mouth As Needed. 5/23/23  Yes Ivet Manzano MD   aspirin 81 MG chewable tablet Chew 1 tablet Daily. 12/10/23   Bobby Carter MD   hydroxychloroquine (PLAQUENIL) 200 MG tablet Take 1 tablet by mouth Daily. 11/22/22   Ivet Manzano MD   oxyCODONE (ROXICODONE) 5 MG immediate release tablet Take 1 tablet by mouth Every 6 (Six) Hours As Needed for Severe Pain. 9/14/23 3/2/24  Ievt Manzano MD       Allergies:  Allergies   Allergen Reactions    Minoxidil Hives and Other (See Comments)     Pericardial effusion .       Objective     Vitals:   Temp:  [99.3 °F (37.4 °C)] 99.3 °F (37.4 °C)  Heart Rate:  [98] 98  Resp:  [18] 18  BP: (164-165)/(125-127) 165/125  No intake or output data in the 24 hours ending 03/02/24 1233    Physical Exam:    General Appearance: chronically ill but pleasant AAF who looks older than age, no distress  Skin: warm and dry  HEENT: oral mucosa normal, nonicteric sclera  Neck: [facility] TDC, no JVD  Lungs: CTA bilat no rales  Heart: RRR, normal S1 and S2  Abdomen: mild epigastric TTP, nondistended   : no palpable bladder  Extremities: no edema, cyanosis or clubbing  Neuro: normal speech and  mental status     Scheduled Meds:     aspirin, 81 mg, Oral, Daily  carvedilol, 25 mg, Oral, Q12H  Diclofenac Sodium, 4 g, Topical, TID  escitalopram, 10 mg, Oral, Daily  folic acid, 1 mg, Oral, Daily  heparin (porcine), 5,000 Units, Subcutaneous, Q8H  hydrALAZINE, 25 mg, Oral, TID  hydroxychloroquine, 200 mg, Oral, Daily  lacosamide, 100 mg, Oral, Q12H  lisinopril, 10 mg, Oral, Q24H  mycophenolate, 250 mg, Oral, Q12H  sodium chloride, 10 mL, Intravenous, Q12H      IV Meds:        Results Reviewed:   I have personally reviewed the results from the time of this admission to 3/2/2024 12:33 EST     Lab Results   Component Value Date    GLUCOSE 83 03/02/2024    CALCIUM 8.1 (L) 03/02/2024     03/02/2024    K 3.6 03/02/2024    CO2 27.0 03/02/2024     03/02/2024    BUN 15 03/02/2024    CREATININE 7.00 (H) 03/02/2024    EGFRIFAFRI 8 (L) 03/20/2023    BCR 2.1 (L) 03/02/2024    ANIONGAP 11.0 03/02/2024      Lab Results   Component Value Date    MG 1.9 01/23/2024    PHOS 4.1 01/27/2024    ALBUMIN 2.5 (L) 03/02/2024           Assessment / Plan     ASSESSMENT:  ESRD - HD TTS at [facility].  Last dialysis THURS.  K/HCO3 normal.  Waste products acceptable.  Likely vol depleted on basis of acute pancreatitis, w/o dyspnea/edema.  Access is [facility] TD   Recurrent acute pancreatitis, 2nd episode in 3 mos.  GI eval noted.  Etiol unclear.  Note plan to check GIANFRANCO & IgG4  Anemia of CKD (& ABL?) - hgb down to 6.0; no melena.  Had EGD/colonoscopy year ago, former showing gastritis.  Note possible need for repeat EGD   HTN - BP elevated.  No vol excess contributory.  On coreg, lisinopril, low dose hydralazine.  Appears norvasc stopped recent admission due to excessive BP control  SLE on IS (cellcept, plaquenil); no assoc leukopenia   MBD, unsure if on renvela, check phos in AM, may be low with n/v and poor PO intake     PLAN:  HD today w/o ultrafiltration  Transfuse 2u PRBC on dialysis   Refrain from IVF, GI to allow diet  resumption  Will request BP update post dialysis, room to titrate ACEi or hydralazine, favor former     Thank you for involving us in the care of Dee Herrera.  Please feel free to call with any questions.    Lance Martínez MD  03/02/24  12:33 [facility]    [facility]  [phone]

## 2024-03-03 LAB
ALBUMIN SERPL-MCNC: 2.6 G/DL (ref 3.5–5.2)
ANION GAP SERPL CALCULATED.3IONS-SCNC: 8 MMOL/L (ref 5–15)
BASOPHILS # BLD AUTO: 0.01 10*3/MM3 (ref 0–0.2)
BASOPHILS NFR BLD AUTO: 0.2 % (ref 0–1.5)
BH BB BLOOD EXPIRATION DATE: NORMAL
BH BB BLOOD EXPIRATION DATE: NORMAL
BH BB BLOOD TYPE BARCODE: 5100
BH BB BLOOD TYPE BARCODE: 5100
BH BB DISPENSE STATUS: NORMAL
BH BB DISPENSE STATUS: NORMAL
BH BB PRODUCT CODE: NORMAL
BH BB PRODUCT CODE: NORMAL
BH BB UNIT NUMBER: NORMAL
BH BB UNIT NUMBER: NORMAL
BUN SERPL-MCNC: 4 MG/DL (ref 6–20)
BUN/CREAT SERPL: 1.3 (ref 7–25)
CALCIUM SPEC-SCNC: 8 MG/DL (ref 8.6–10.5)
CHLORIDE SERPL-SCNC: 102 MMOL/L (ref 98–107)
CO2 SERPL-SCNC: 26 MMOL/L (ref 22–29)
CREAT SERPL-MCNC: 3.1 MG/DL (ref 0.57–1)
CROSSMATCH INTERPRETATION: NORMAL
CROSSMATCH INTERPRETATION: NORMAL
DEPRECATED RDW RBC AUTO: 49.1 FL (ref 37–54)
EGFRCR SERPLBLD CKD-EPI 2021: 19.9 ML/MIN/1.73
EOSINOPHIL # BLD AUTO: 0.06 10*3/MM3 (ref 0–0.4)
EOSINOPHIL NFR BLD AUTO: 1.3 % (ref 0.3–6.2)
ERYTHROCYTE [DISTWIDTH] IN BLOOD BY AUTOMATED COUNT: 17.8 % (ref 12.3–15.4)
FERRITIN SERPL-MCNC: 2783 NG/ML (ref 13–150)
GLUCOSE SERPL-MCNC: 85 MG/DL (ref 65–99)
HCT VFR BLD AUTO: 32 % (ref 34–46.6)
HGB BLD-MCNC: 9.8 G/DL (ref 12–15.9)
IRON 24H UR-MRATE: 38 MCG/DL (ref 37–145)
IRON SATN MFR SERPL: 29 % (ref 20–50)
LYMPHOCYTES # BLD AUTO: 0.68 10*3/MM3 (ref 0.7–3.1)
LYMPHOCYTES NFR BLD AUTO: 14.9 % (ref 19.6–45.3)
MAGNESIUM SERPL-MCNC: 1.7 MG/DL (ref 1.6–2.6)
MCH RBC QN AUTO: 23.9 PG (ref 26.6–33)
MCHC RBC AUTO-ENTMCNC: 30.6 G/DL (ref 31.5–35.7)
MCV RBC AUTO: 78 FL (ref 79–97)
MONOCYTES # BLD AUTO: 0.63 10*3/MM3 (ref 0.1–0.9)
MONOCYTES NFR BLD AUTO: 13.8 % (ref 5–12)
NEUTROPHILS NFR BLD AUTO: 3.17 10*3/MM3 (ref 1.7–7)
NEUTROPHILS NFR BLD AUTO: 69.4 % (ref 42.7–76)
PHOSPHATE SERPL-MCNC: 2 MG/DL (ref 2.5–4.5)
PLATELET # BLD AUTO: 167 10*3/MM3 (ref 140–450)
PMV BLD AUTO: ABNORMAL FL
POTASSIUM SERPL-SCNC: 3.8 MMOL/L (ref 3.5–5.2)
QT INTERVAL: 374 MS
QTC INTERVAL: 485 MS
RBC # BLD AUTO: 4.1 10*6/MM3 (ref 3.77–5.28)
SODIUM SERPL-SCNC: 136 MMOL/L (ref 136–145)
TIBC SERPL-MCNC: 133 MCG/DL (ref 298–536)
TRANSFERRIN SERPL-MCNC: 89 MG/DL (ref 200–360)
TROPONIN T SERPL HS-MCNC: 139 NG/L
UNIT  ABO: NORMAL
UNIT  ABO: NORMAL
UNIT  RH: NORMAL
UNIT  RH: NORMAL
WBC NRBC COR # BLD AUTO: 4.57 10*3/MM3 (ref 3.4–10.8)

## 2024-03-03 PROCEDURE — 84466 ASSAY OF TRANSFERRIN: CPT | Performed by: INTERNAL MEDICINE

## 2024-03-03 PROCEDURE — 93005 ELECTROCARDIOGRAM TRACING: CPT | Performed by: INTERNAL MEDICINE

## 2024-03-03 PROCEDURE — 82728 ASSAY OF FERRITIN: CPT | Performed by: INTERNAL MEDICINE

## 2024-03-03 PROCEDURE — 93010 ELECTROCARDIOGRAM REPORT: CPT | Performed by: INTERNAL MEDICINE

## 2024-03-03 PROCEDURE — 80069 RENAL FUNCTION PANEL: CPT | Performed by: INTERNAL MEDICINE

## 2024-03-03 PROCEDURE — 83540 ASSAY OF IRON: CPT | Performed by: INTERNAL MEDICINE

## 2024-03-03 PROCEDURE — 84484 ASSAY OF TROPONIN QUANT: CPT | Performed by: INTERNAL MEDICINE

## 2024-03-03 PROCEDURE — 63710000001 MYCOPHENOLATE MOFETIL PER 250 MG: Performed by: INTERNAL MEDICINE

## 2024-03-03 PROCEDURE — 25010000002 HYDROMORPHONE PER 4 MG: Performed by: INTERNAL MEDICINE

## 2024-03-03 PROCEDURE — 83735 ASSAY OF MAGNESIUM: CPT | Performed by: INTERNAL MEDICINE

## 2024-03-03 PROCEDURE — 99232 SBSQ HOSP IP/OBS MODERATE 35: CPT | Performed by: INTERNAL MEDICINE

## 2024-03-03 PROCEDURE — 25010000002 HEPARIN (PORCINE) PER 1000 UNITS: Performed by: INTERNAL MEDICINE

## 2024-03-03 PROCEDURE — 85025 COMPLETE CBC W/AUTO DIFF WBC: CPT | Performed by: INTERNAL MEDICINE

## 2024-03-03 RX ORDER — NITROGLYCERIN 0.4 MG/1
0.4 TABLET SUBLINGUAL
Status: DISCONTINUED | OUTPATIENT
Start: 2024-03-03 | End: 2024-03-04 | Stop reason: HOSPADM

## 2024-03-03 RX ORDER — HYDRALAZINE HYDROCHLORIDE 25 MG/1
25 TABLET, FILM COATED ORAL EVERY 12 HOURS SCHEDULED
Status: DISCONTINUED | OUTPATIENT
Start: 2024-03-03 | End: 2024-03-04 | Stop reason: HOSPADM

## 2024-03-03 RX ORDER — LISINOPRIL 20 MG/1
20 TABLET ORAL NIGHTLY
Status: DISCONTINUED | OUTPATIENT
Start: 2024-03-04 | End: 2024-03-04 | Stop reason: HOSPADM

## 2024-03-03 RX ADMIN — ESCITALOPRAM OXALATE 10 MG: 10 TABLET, FILM COATED ORAL at 09:05

## 2024-03-03 RX ADMIN — HYDRALAZINE HYDROCHLORIDE 25 MG: 25 TABLET ORAL at 20:47

## 2024-03-03 RX ADMIN — LISINOPRIL 10 MG: 10 TABLET ORAL at 09:05

## 2024-03-03 RX ADMIN — CARVEDILOL 25 MG: 25 TABLET, FILM COATED ORAL at 20:47

## 2024-03-03 RX ADMIN — DICLOFENAC SODIUM 4 G: 10 GEL TOPICAL at 20:48

## 2024-03-03 RX ADMIN — HEPARIN SODIUM 5000 UNITS: 5000 INJECTION INTRAVENOUS; SUBCUTANEOUS at 15:04

## 2024-03-03 RX ADMIN — HYDRALAZINE HYDROCHLORIDE 25 MG: 25 TABLET ORAL at 09:05

## 2024-03-03 RX ADMIN — CARVEDILOL 25 MG: 25 TABLET, FILM COATED ORAL at 09:05

## 2024-03-03 RX ADMIN — MYCOPHENOLATE MOFETIL 250 MG: 250 CAPSULE ORAL at 09:05

## 2024-03-03 RX ADMIN — DICLOFENAC SODIUM 4 G: 10 GEL TOPICAL at 09:10

## 2024-03-03 RX ADMIN — ASPIRIN 81 MG: 81 TABLET, CHEWABLE ORAL at 09:05

## 2024-03-03 RX ADMIN — OXYCODONE HYDROCHLORIDE AND ACETAMINOPHEN 1 TABLET: 5; 325 TABLET ORAL at 09:05

## 2024-03-03 RX ADMIN — HYDROXYCHLOROQUINE SULFATE 200 MG: 200 TABLET, FILM COATED ORAL at 09:05

## 2024-03-03 RX ADMIN — HEPARIN SODIUM 5000 UNITS: 5000 INJECTION INTRAVENOUS; SUBCUTANEOUS at 20:48

## 2024-03-03 RX ADMIN — LACOSAMIDE 100 MG: 100 TABLET, FILM COATED ORAL at 20:47

## 2024-03-03 RX ADMIN — DICLOFENAC SODIUM 4 G: 10 GEL TOPICAL at 15:04

## 2024-03-03 RX ADMIN — Medication 10 ML: at 20:48

## 2024-03-03 RX ADMIN — OXYCODONE HYDROCHLORIDE AND ACETAMINOPHEN 1 TABLET: 5; 325 TABLET ORAL at 20:47

## 2024-03-03 RX ADMIN — HEPARIN SODIUM 5000 UNITS: 5000 INJECTION INTRAVENOUS; SUBCUTANEOUS at 06:44

## 2024-03-03 RX ADMIN — FOLIC ACID 1 MG: 1 TABLET ORAL at 09:05

## 2024-03-03 RX ADMIN — MYCOPHENOLATE MOFETIL 250 MG: 250 CAPSULE ORAL at 20:47

## 2024-03-03 RX ADMIN — LACOSAMIDE 100 MG: 100 TABLET, FILM COATED ORAL at 09:05

## 2024-03-03 RX ADMIN — Medication 10 ML: at 09:10

## 2024-03-03 RX ADMIN — HYDROMORPHONE HYDROCHLORIDE 0.5 MG: 1 INJECTION, SOLUTION INTRAMUSCULAR; INTRAVENOUS; SUBCUTANEOUS at 03:45

## 2024-03-03 NOTE — PLAN OF CARE
Problem: Adult Inpatient Plan of Care  Goal: Plan of Care Review  Outcome: Ongoing, Not Progressing  Flowsheets (Taken 3/3/2024 0639)  Progress: no change  Plan of Care Reviewed With: patient  Outcome Evaluation: HD treatment completed. 2 units of blood given with dialysis. VSS. Pain meds per MAR. Plan of care updated.  Goal: Patient-Specific Goal (Individualized)  Outcome: Ongoing, Not Progressing  Goal: Absence of Hospital-Acquired Illness or Injury  Outcome: Ongoing, Not Progressing  Intervention: Identify and Manage Fall Risk  Recent Flowsheet Documentation  Taken 3/3/2024 0638 by Celeste Johnston, RN  Safety Promotion/Fall Prevention:   safety round/check completed   activity supervised   assistive device/personal items within reach   fall prevention program maintained   nonskid shoes/slippers when out of bed  Taken 3/3/2024 0400 by Celeste Johnston, RN  Safety Promotion/Fall Prevention:   safety round/check completed   activity supervised   assistive device/personal items within reach   fall prevention program maintained   nonskid shoes/slippers when out of bed  Taken 3/3/2024 0215 by Celeste Johnston, RN  Safety Promotion/Fall Prevention:   safety round/check completed   activity supervised   assistive device/personal items within reach   fall prevention program maintained   nonskid shoes/slippers when out of bed  Taken 3/3/2024 0000 by Celeste Johnston, RN  Safety Promotion/Fall Prevention:   safety round/check completed   activity supervised   assistive device/personal items within reach   fall prevention program maintained   nonskid shoes/slippers when out of bed  Taken 3/2/2024 2015 by Celeste Johnston, RN  Safety Promotion/Fall Prevention:   safety round/check completed   activity supervised   assistive device/personal items within reach   fall prevention program maintained   nonskid shoes/slippers when out of bed  Intervention: Prevent and Manage VTE (Venous Thromboembolism) Risk  Recent Flowsheet  Documentation  Taken 3/3/2024 0400 by Celeste Johnston RN  Activity Management: up ad italo  Taken 3/3/2024 0215 by Celeste Johnston RN  Activity Management: up ad italo  Taken 3/3/2024 0000 by Celeste Johnston RN  Activity Management: up ad italo  Taken 3/2/2024 2015 by Celeste Johnston RN  Activity Management: up ad italo  VTE Prevention/Management: (SQheparin) other (see comments)  Intervention: Prevent Infection  Recent Flowsheet Documentation  Taken 3/3/2024 0638 by Celeste Johnston RN  Infection Prevention:   rest/sleep promoted   personal protective equipment utilized  Taken 3/3/2024 0400 by Celeste Johnston RN  Infection Prevention:   rest/sleep promoted   personal protective equipment utilized  Taken 3/3/2024 0215 by Celeste Johnston RN  Infection Prevention:   rest/sleep promoted   personal protective equipment utilized  Taken 3/3/2024 0000 by Celeste Johnston RN  Infection Prevention:   rest/sleep promoted   personal protective equipment utilized   hand hygiene promoted  Goal: Optimal Comfort and Wellbeing  Outcome: Ongoing, Not Progressing  Intervention: Provide Person-Centered Care  Recent Flowsheet Documentation  Taken 3/3/2024 0000 by Celeste Johnston RN  Trust Relationship/Rapport:   care explained   choices provided   questions answered   reassurance provided   thoughts/feelings acknowledged  Taken 3/2/2024 2015 by Celeste Johnston RN  Trust Relationship/Rapport:   care explained   choices provided   questions answered   reassurance provided   thoughts/feelings acknowledged  Goal: Readiness for Transition of Care  Outcome: Ongoing, Not Progressing   Goal Outcome Evaluation:  Plan of Care Reviewed With: patient        Progress: no change  Outcome Evaluation: HD treatment completed. 2 units of blood given with dialysis. VSS. Pain meds per MAR. Plan of care updated.

## 2024-03-03 NOTE — PLAN OF CARE
Problem: Adult Inpatient Plan of Care  Goal: Plan of Care Review  Outcome: Ongoing, Progressing  Goal: Patient-Specific Goal (Individualized)  Outcome: Ongoing, Progressing  Goal: Absence of Hospital-Acquired Illness or Injury  Outcome: Ongoing, Progressing  Intervention: Identify and Manage Fall Risk  Recent Flowsheet Documentation  Taken 3/3/2024 1605 by Elver Velasquez RN  Safety Promotion/Fall Prevention:   safety round/check completed   room organization consistent   activity supervised   assistive device/personal items within reach   clutter free environment maintained   fall prevention program maintained  Taken 3/3/2024 1420 by Elver Velasquez RN  Safety Promotion/Fall Prevention:   safety round/check completed   room organization consistent   activity supervised   assistive device/personal items within reach   clutter free environment maintained   fall prevention program maintained  Taken 3/3/2024 1205 by Elver Velasquez RN  Safety Promotion/Fall Prevention:   safety round/check completed   room organization consistent   activity supervised   assistive device/personal items within reach   clutter free environment maintained   fall prevention program maintained  Taken 3/3/2024 1030 by Elver Velasquez RN  Safety Promotion/Fall Prevention:   safety round/check completed   room organization consistent   activity supervised   assistive device/personal items within reach   clutter free environment maintained   fall prevention program maintained  Taken 3/3/2024 0835 by Elver Velasquez RN  Safety Promotion/Fall Prevention:   safety round/check completed   room organization consistent   activity supervised   assistive device/personal items within reach   clutter free environment maintained   fall prevention program maintained  Intervention: Prevent Skin Injury  Recent Flowsheet Documentation  Taken 3/3/2024 1605 by Elver Velasquez RN  Body  Position: position changed independently  Taken 3/3/2024 1420 by Elver Velasquez RN  Body Position: position changed independently  Taken 3/3/2024 1205 by Elver Velasquez RN  Body Position: position changed independently  Taken 3/3/2024 1030 by Elver Velasquez RN  Body Position: position changed independently  Taken 3/3/2024 0835 by Elver Velasquez RN  Body Position: position changed independently  Skin Protection: adhesive use limited  Intervention: Prevent and Manage VTE (Venous Thromboembolism) Risk  Recent Flowsheet Documentation  Taken 3/3/2024 1605 by Elver Velasquez RN  Activity Management: up ad italo  Taken 3/3/2024 1420 by Elver Velasquez RN  Activity Management: up ad italo  Taken 3/3/2024 1205 by Elver Velasquez RN  Activity Management: up ad italo  Taken 3/3/2024 1030 by Elver Velasquez RN  Activity Management: up ad italo  Taken 3/3/2024 0835 by Elver Velasquez RN  Activity Management: up ad italo  VTE Prevention/Management: (Heparin SQ) --  Intervention: Prevent Infection  Recent Flowsheet Documentation  Taken 3/3/2024 1605 by Elver Velasquez RN  Infection Prevention:   single patient room provided   hand hygiene promoted   equipment surfaces disinfected   environmental surveillance performed  Taken 3/3/2024 1420 by Elver Velasquez RN  Infection Prevention:   single patient room provided   hand hygiene promoted   equipment surfaces disinfected   environmental surveillance performed  Taken 3/3/2024 1205 by Elver Velasquez RN  Infection Prevention:   single patient room provided   equipment surfaces disinfected   environmental surveillance performed   hand hygiene promoted  Taken 3/3/2024 1030 by Elver Velasquez RN  Infection Prevention:   single patient room provided   hand hygiene promoted   equipment surfaces disinfected   environmental surveillance  performed  Taken 3/3/2024 0835 by Elver Velasquez RN  Infection Prevention:   single patient room provided   environmental surveillance performed   equipment surfaces disinfected   hand hygiene promoted  Goal: Optimal Comfort and Wellbeing  Outcome: Ongoing, Progressing  Intervention: Provide Person-Centered Care  Recent Flowsheet Documentation  Taken 3/3/2024 1420 by Elver Velasquez RN  Trust Relationship/Rapport:   care explained   choices provided   questions answered  Taken 3/3/2024 0835 by Elver Velasquez RN  Trust Relationship/Rapport:   care explained   choices provided   questions encouraged  Goal: Readiness for Transition of Care  Outcome: Ongoing, Progressing     Problem: Pain Acute  Goal: Acceptable Pain Control and Functional Ability  Outcome: Ongoing, Progressing  Intervention: Prevent or Manage Pain  Recent Flowsheet Documentation  Taken 3/3/2024 1605 by Elver Velasquez RN  Medication Review/Management: medications reviewed  Taken 3/3/2024 1420 by Elver Velasquez RN  Medication Review/Management: medications reviewed  Taken 3/3/2024 1205 by Elver Velasquez RN  Medication Review/Management: medications reviewed  Taken 3/3/2024 1030 by Elver Velasquez RN  Medication Review/Management: medications reviewed  Taken 3/3/2024 0835 by Elver Velasquez RN  Medication Review/Management: medications reviewed  Intervention: Optimize Psychosocial Wellbeing  Recent Flowsheet Documentation  Taken 3/3/2024 1420 by Elver Velasquez, RN  Diversional Activities: television  Taken 3/3/2024 0835 by Elver Velasquez, RN  Diversional Activities: television     Problem: Adjustment to Illness (Chronic Kidney Disease)  Goal: Optimal Coping with Chronic Illness  Outcome: Ongoing, Progressing  Intervention: Support Psychosocial Response  Recent Flowsheet Documentation  Taken 3/3/2024 1420 by Mo Kim  SULMA Raya  Family/Support System Care: self-care encouraged  Taken 3/3/2024 0835 by Elver Velasquez RN  Family/Support System Care: self-care encouraged     Problem: Electrolyte Imbalance (Chronic Kidney Disease)  Goal: Electrolyte Balance  Outcome: Ongoing, Progressing     Problem: Fluid Volume Excess (Chronic Kidney Disease)  Goal: Fluid Balance  Outcome: Ongoing, Progressing  Intervention: Monitor and Manage Hypervolemia  Recent Flowsheet Documentation  Taken 3/3/2024 0835 by Elver Velasquez RN  Skin Protection: adhesive use limited     Problem: Functional Decline (Chronic Kidney Disease)  Goal: Optimal Functional Ability  Outcome: Ongoing, Progressing  Intervention: Optimize Functional Ability  Recent Flowsheet Documentation  Taken 3/3/2024 1605 by Elver Velasquez RN  Activity Management: up ad italo  Taken 3/3/2024 1420 by Elver Velasquez, RN  Activity Management: up ad italo  Taken 3/3/2024 1205 by Elver Velasquez RN  Activity Management: up ad italo  Taken 3/3/2024 1030 by Elver Velasquez RN  Activity Management: up ad italo  Taken 3/3/2024 0835 by Elver Velasquez RN  Activity Management: up ad italo     Problem: Hematologic Alteration (Chronic Kidney Disease)  Goal: Absence of Anemia Signs and Symptoms  Outcome: Ongoing, Progressing     Problem: Oral Intake Inadequate (Chronic Kidney Disease)  Goal: Optimal Oral Intake  Outcome: Ongoing, Progressing     Problem: Pain (Chronic Kidney Disease)  Goal: Acceptable Pain Control  Outcome: Ongoing, Progressing     Problem: Renal Function Impairment (Chronic Kidney Disease)  Goal: Minimize Renal Failure Effects  Outcome: Ongoing, Progressing  Intervention: Protect and Monitor Dialysis Access Site  Recent Flowsheet Documentation  Taken 3/3/2024 1605 by Elver Velasquez, RN  Safety Precautions: soft boundaries provided  Taken 3/3/2024 1420 by Elver Velasquez, RN  Safety  Precautions: soft boundaries provided  Taken 3/3/2024 0835 by Elver Velasquez, RN  Safety Precautions: soft boundaries provided  Intervention: Monitor and Support Renal Function  Recent Flowsheet Documentation  Taken 3/3/2024 1605 by Elver Velasquez RN  Medication Review/Management: medications reviewed  Taken 3/3/2024 1420 by Elver Velasquez RN  Medication Review/Management: medications reviewed  Taken 3/3/2024 1205 by Elver Velasquez RN  Medication Review/Management: medications reviewed  Taken 3/3/2024 1030 by Elver Velasquez RN  Medication Review/Management: medications reviewed  Taken 3/3/2024 0835 by Elver Velasquez RN  Medication Review/Management: medications reviewed     Problem: Fluid Imbalance (Pancreatitis)  Goal: Fluid Balance  Outcome: Ongoing, Progressing     Problem: Infection (Pancreatitis)  Goal: Infection Symptom Resolution  Outcome: Ongoing, Progressing     Problem: Nutrition Impaired (Pancreatitis)  Goal: Optimal Nutrition Intake  Outcome: Ongoing, Progressing     Problem: Pain (Pancreatitis)  Goal: Acceptable Pain Control  Outcome: Ongoing, Progressing  Intervention: Monitor and Manage Pain  Recent Flowsheet Documentation  Taken 3/3/2024 1420 by Elver Velasquez RN  Diversional Activities: television  Taken 3/3/2024 0835 by Elver Velasquez RN  Diversional Activities: television     Problem: Respiratory Compromise (Pancreatitis)  Goal: Effective Oxygenation and Ventilation  Outcome: Ongoing, Progressing  Intervention: Optimize Oxygenation and Ventilation  Recent Flowsheet Documentation  Taken 3/3/2024 1605 by Elver Velasquez, RN  Activity Management: up ad italo  Head of Bed (HOB) Positioning: HOB at 30 degrees  Taken 3/3/2024 1420 by Elver Velasquez RN  Activity Management: up ad italo  Head of Bed (HOB) Positioning: HOB at 30-45 degrees  Taken 3/3/2024 1205 by Mo Kim  SULMA Raya  Activity Management: up ad italo  Head of Bed (HOB) Positioning: HOB at 30-45 degrees  Taken 3/3/2024 1030 by Elver Velasquez RN  Activity Management: up ad italo  Head of Bed (HOB) Positioning: HOB at 20 degrees  Taken 3/3/2024 0835 by Elver Velasquez RN  Activity Management: up ad italo  Head of Bed (HOB) Positioning: HOB at 20-30 degrees  Cough And Deep Breathing: done independently per patient     Problem: Device-Related Complication Risk (Hemodialysis)  Goal: Safe, Effective Therapy Delivery  Outcome: Ongoing, Progressing  Intervention: Optimize Device Care and Function  Recent Flowsheet Documentation  Taken 3/3/2024 1605 by Elver Velasquez RN  Medication Review/Management: medications reviewed  Taken 3/3/2024 1420 by Elver Velasquez RN  Medication Review/Management: medications reviewed  Taken 3/3/2024 1205 by Elver Velasquez RN  Medication Review/Management: medications reviewed  Taken 3/3/2024 1030 by Elver Velasquez RN  Medication Review/Management: medications reviewed  Taken 3/3/2024 0835 by Elver Velasquez RN  Medication Review/Management: medications reviewed     Problem: Hemodynamic Instability (Hemodialysis)  Goal: Effective Tissue Perfusion  Outcome: Ongoing, Progressing     Problem: Infection (Hemodialysis)  Goal: Absence of Infection Signs and Symptoms  Outcome: Ongoing, Progressing     Problem: Fall Injury Risk  Goal: Absence of Fall and Fall-Related Injury  Outcome: Ongoing, Progressing  Intervention: Identify and Manage Contributors  Recent Flowsheet Documentation  Taken 3/3/2024 1605 by Elver Velasquez RN  Medication Review/Management: medications reviewed  Taken 3/3/2024 1420 by Elver Velasquez, RN  Medication Review/Management: medications reviewed  Taken 3/3/2024 1205 by Elver Velasquez, RN  Medication Review/Management: medications reviewed  Taken 3/3/2024 1030 by  Elver Velasquez, RN  Medication Review/Management: medications reviewed  Taken 3/3/2024 0835 by Elver Velasquez RN  Medication Review/Management: medications reviewed  Intervention: Promote Injury-Free Environment  Recent Flowsheet Documentation  Taken 3/3/2024 1605 by Elver Velasquez, RN  Safety Promotion/Fall Prevention:   safety round/check completed   room organization consistent   activity supervised   assistive device/personal items within reach   clutter free environment maintained   fall prevention program maintained  Taken 3/3/2024 1420 by Elver Velasquez, RN  Safety Promotion/Fall Prevention:   safety round/check completed   room organization consistent   activity supervised   assistive device/personal items within reach   clutter free environment maintained   fall prevention program maintained  Taken 3/3/2024 1205 by Elver Velasquez, RN  Safety Promotion/Fall Prevention:   safety round/check completed   room organization consistent   activity supervised   assistive device/personal items within reach   clutter free environment maintained   fall prevention program maintained  Taken 3/3/2024 1030 by Elver Velasquez, RN  Safety Promotion/Fall Prevention:   safety round/check completed   room organization consistent   activity supervised   assistive device/personal items within reach   clutter free environment maintained   fall prevention program maintained  Taken 3/3/2024 0835 by Elver Velasquez, RN  Safety Promotion/Fall Prevention:   safety round/check completed   room organization consistent   activity supervised   assistive device/personal items within reach   clutter free environment maintained   fall prevention program maintained   Goal Outcome Evaluation:         Pt Alert, oriented x4, VSS, sinus rhythm, room air, denied chest pain during this shift. C/o back pain PRN percocet given x1 and effective. Tolerate diet wellk, no  nausea or vomiting reported . Pt able to take a shower by doctor order. Plan of care ongoing.

## 2024-03-03 NOTE — NURSING NOTE
Patient called out complaining of chest pain/pressure, radiating to L shoulder and back. PRN Dilaudid given. STAT labs and EKG obtained. EKG unchanged. Troponin elevated.CP now resolved, VSS. Reviewed results with Roseline MAYNARD with [facility]. No new orders. Continue to monitor.

## 2024-03-03 NOTE — PAYOR COMM NOTE
"HerreraLuz Elena (33 y.o. Female)     ATTN: NURSE REVIEWER  RE: INITIAL INPT AUTH CLINICALS   REF# [id number]   PLS REPLY TO ARIANA ALANIZ [phone] OR FAX# [phone]      Date of Birth   1990    Social Security Number       Address   [address] [facility] [address] [place] [address]    Home Phone   [phone]    MRN   [id number]       Faith   Christianity    Marital Status   Single                            Admission Date   3/2/24    Admission Type   Elective    Admitting Provider   Bobby Carter MD    Attending Provider   Bobby Carter MD    Department, Room/Bed   [address], S413/1       Discharge Date       Discharge Disposition       Discharge Destination                                 Attending Provider: Bobby Carter MD    Allergies: Minoxidil    Isolation: None   Infection: None   Code Status: CPR    Ht: 167.6 cm (65.98\")   Wt: 45.9 kg (101 lb 3.1 oz)    Admission Cmt: None   Principal Problem: Acute pancreatitis [K85.90]                   Active Insurance as of 3/2/2024       Primary Coverage       Payor Plan Insurance Group Employer/Plan Group    ANTHEM MEDICARE REPLACEMENT ANTHEM MEDICARE ADVANTAGE [id number]       Payor Plan Address Payor Plan Phone Number Payor Plan Fax Number Effective Dates    [address] [phone]  2/1/2024 - None Entered    [facility] [id number]         Subscriber Name Subscriber Birth Date Member ID       LUZ ELENA HERRERA [place] 1990 [id number]                     Emergency Contacts        (Rel.) Home Phone Work Phone Mobile Phone    DANYELL HERRERA \"Sajrou\" (HCS) (Mother) -- -- [phone]    PAULINE HRERERA (Grandparent) [phone] -- [phone]    verito herrera (Sister) -- -- [phone]    precious poe (Brother) -- -- [phone]                 History & Physical        Bobby Carter MD at 03/02/24 1119          [facility] Admission H&P    Patient Care Team:  Peggy, " CAESAR Cutler as PCP - General (Nurse Practitioner)  Winnie Sanchez MD as Referring Physician (Obstetrics and Gynecology)  Norberto Almaraz MD PhD as Consulting Physician (Hematology and Oncology)  Lupis Thomas MD (Inactive) as Consulting Physician (Nephrology)  Hair Mckeon MD as Consulting Physician (Nephrology)  Juana Taylor MD as Consulting Physician (Cardiology)    Chief complaint: Abdominal pain, vomiting    History of Present Illness    This is a 33-year-old female with history of lupus, end-stage renal disease, pancreatitis amongst numerous other medical issues as outlined below who presented to an outside emergency room with complaints of abdominal pain and vomiting.  She was found to have acute pancreatitis with peripancreatic fluid collections.  She was admitted at this facility back in January for similar presentation.  Presently she states her abdominal pain has improved since her initial presentation to the emergency room.  She denies any additional vomiting and has no nausea.  She is asking if she can eat.  She denies any fevers or chills.    Past Medical History:   Diagnosis Date    Anasarca     PER CT SCAN    Anxiety     CHF (congestive heart failure)     Dry skin     ESRD (end stage renal disease) on dialysis     MAROC COLE, SAT UMAIR FRASER    History of abdominal pain     History of anemia     History of transfusion     Hypertension     Iron deficiency anemia 09/27/2021    Lupus (systemic lupus erythematosus) 07/30/2022    Migraine     Other specified nutritional anemias     Pancreatitis     Pericardial effusion     Renal insufficiency     Scoliosis     Seizures     STATES LAST WAS 1/2023    Shortness of breath     OCCASIONAL    Vitamin D deficiency 09/27/2021     Past Surgical History:   Procedure Laterality Date    ASCENDING AORTIC ANEURYSM REPAIR W/ MECHANICAL AORTIC VALVE REPLACEMENT N/A 11/2/2023    Procedure: CHETNA STERNOTOMY, AORTIC ROOT REPLACEMENT WITH VALVE  CHENG FLORENTINO PROCEDURE, REPLACEMENT OF ASCENDING AORTA, RIGHT FEMORAL DIALYSIS CATHETER PLACEMENT AND PRP;  Surgeon: Chris Medina MD;  Location: [facility] CVOR;  Service: Cardiothoracic;  Laterality: N/A;    CARDIAC CATHETERIZATION N/A 06/14/2023    Procedure: Coronary angiography;  Surgeon: Juana Taylor MD;  Location: [facility] CATH INVASIVE LOCATION;  Service: Cardiovascular;  Laterality: N/A;    CARDIAC CATHETERIZATION N/A 06/14/2023    Procedure: Left heart cath;  Surgeon: Juana Taylor MD;  Location: [facility] CATH INVASIVE LOCATION;  Service: Cardiovascular;  Laterality: N/A;    CARDIAC CATHETERIZATION N/A 06/14/2023    Procedure: Right Heart Cath;  Surgeon: Juana Taylor MD;  Location: [facility] CATH INVASIVE LOCATION;  Service: Cardiovascular;  Laterality: N/A;    COLONOSCOPY N/A 7/20/2023    Procedure: COLONOSCOPY to cecum with biopsy;  Surgeon: Drew Kaminski MD;  Location: [facility] ENDOSCOPY;  Service: Gastroenterology;  Laterality: N/A;  PRE - diarrhea, constipation  POST - fair prep, normal    CORONARY ARTERY BYPASS GRAFT N/A 11/6/2023    Procedure: STERNAL EXPLORATION AND WASH OUT;  Surgeon: Jr Mitesh Quiroz MD;  [place] [facility]  Service: Cardiothoracic;  Laterality: N/A;    ENDOSCOPY N/A 7/20/2023    Procedure: ESOPHAGOGASTRODUODENOSCOPY with biopsy;  Surgeon: Drew Kaminski MD;  Location: [facility] ENDOSCOPY;  Service: Gastroenterology;  Laterality: N/A;  PRE - abn ct abd  POST - gastritis    INSERTION HEMODIALYSIS CATHETER N/A 07/26/2022    Procedure: RIGHT TUNNELED DIALYSIS CATHETER PLACEMENT;  Surgeon: Diandra Adhikari MD;  Location: [facility] MAIN OR;  Service: Vascular;  Laterality: N/A;    INSERTION HEMODIALYSIS CATHETER Left 11/29/2023    Procedure: TUNNELED DIALYSIS CATHETER PLACEMENT;  Surgeon: Jose Patel II, MD;  Location: [facility] MAIN OR;  Service: Vascular;  Laterality: Left;    INSERTION PERITONEAL DIALYSIS CATHETER N/A 04/03/2023    Procedure: INSERTION PERITONEAL DIALYSIS  CATHETER LAPAROSCOPIC, omentumpexy;  Surgeon: Jemal Loyola MD;  Location: [facility] MAIN OR;  Service: General;  Laterality: N/A;    REMOVAL PERITONEAL DIALYSIS CATHETER N/A 12/1/2023    Procedure: REMOVAL PERITONEAL DIALYSIS CATHETER;  Surgeon: Maryellen Ashton MD;  Location:  [facility] MAIN OR;  Service: General;  Laterality: N/A;    TONSILLECTOMY       Family History   Problem Relation Age of Onset    Autoimmune disease Mother     Anemia Mother     Diabetes Sister     Anemia Brother     Diabetes Maternal Grandmother     Hypertension Maternal Grandmother     Cancer Maternal Grandmother     Sickle cell anemia Cousin     Malig Hyperthermia Neg Hx      Social History     Tobacco Use    Smoking status: Some Days     Types: Cigars     Passive exposure: Past    Smokeless tobacco: Never    Tobacco comments:     Patient smoked black & mild   Vaping Use    Vaping status: Never Used   Substance Use Topics    Alcohol use: Yes     Alcohol/week: 8.0 standard drinks of alcohol     Types: 2 Glasses of wine, 4 Shots of liquor, 2 Drinks containing 0.5 oz of alcohol per week     Comment: social    Drug use: Yes     Types: Marijuana, Oxycodone     Comment: OCCASIONAL     Medications Prior to Admission   Medication Sig Dispense Refill Last Dose    carvedilol (COREG) 25 MG tablet Take 1 tablet by mouth Every 12 (Twelve) Hours. 60 tablet 0 3/1/2024    Diclofenac Sodium (VOLTAREN) 1 % gel gel Apply 4 g topically to the appropriate area as directed 3 (Three) Times a Day. 150 g 0 3/1/2024    escitalopram (LEXAPRO) 10 MG tablet Take 1 tablet by mouth Daily.   3/1/2024    folic acid (FOLVITE) 1 MG tablet Take 1 tablet by mouth Daily. 90 tablet 1 3/1/2024    hydrALAZINE (APRESOLINE) 25 MG tablet Take 1 tablet by mouth 3 (Three) Times a Day.   3/1/2024    lacosamide (VIMPAT) 100 MG tablet tablet Take 1 tablet by mouth Every 12 (Twelve) Hours. 6 tablet 0 3/1/2024    lisinopril (PRINIVIL,ZESTRIL) 10 MG tablet Take 1 tablet by mouth  Daily.   3/1/2024    mycophenolate (CELLCEPT) 500 MG tablet Take 0.5 tablets by mouth Every 12 (Twelve) Hours. 30 tablet 0 3/1/2024    ondansetron (Zofran) 4 MG tablet Take 1 tablet by mouth Every 8 (Eight) Hours As Needed for Nausea or Vomiting. 30 tablet 1 3/2/2024    ondansetron ODT (ZOFRAN-ODT) 4 MG disintegrating tablet Place 1 tablet on the tongue Every 8 (Eight) Hours As Needed for Vomiting or Nausea. 30 tablet 0 3/2/2024    oxyCODONE-acetaminophen (PERCOCET) 5-325 MG per tablet Take 1 tablet by mouth Every 4 (Four) Hours As Needed for Moderate Pain. 12 tablet 0 3/1/2024    polyethylene glycol (MIRALAX) 17 GM/SCOOP powder Take 17 g by mouth As Needed.   Past Week    aspirin 81 MG chewable tablet Chew 1 tablet Daily.       hydroxychloroquine (PLAQUENIL) 200 MG tablet Take 1 tablet by mouth Daily.   2024     Allergies:  Minoxidil    Review of Systems   Constitutional:  Negative for chills and fever.   HENT:  Negative for congestion and sore throat.    Eyes:  Negative for visual disturbance.   Respiratory:  Negative for cough, chest tightness, shortness of breath and wheezing.    Cardiovascular:  Negative for chest pain, palpitations and leg swelling.   Gastrointestinal:  Positive for abdominal pain. Negative for abdominal distention, diarrhea, nausea and vomiting.   Endocrine: Negative for polydipsia and polyuria.   Genitourinary:  Negative for difficulty urinating, dysuria, frequency and urgency.   Musculoskeletal:  Negative for arthralgias and myalgias.   Skin:  Negative for color change and rash.   Neurological:  Negative for dizziness and light-headedness.        PHYSICAL EXAM    Vital Signs  tMax 24 hrs:  Temp (24hrs), Av.3 °F (37.4 °C), Min:99.3 °F (37.4 °C), Max:99.3 °F (37.4 °C)    Vitals Ranges:  Temp:  [99.3 °F (37.4 °C)] 99.3 °F (37.4 °C)  Heart Rate:  [98] 98  Resp:  [18] 18  BP: (164-165)/(125-127) 165/125    Physical Exam  Vitals and nursing note reviewed.   Constitutional:       General:  She is not in acute distress.  HENT:      Head: Normocephalic and atraumatic.   Eyes:      Extraocular Movements: Extraocular movements intact.      Pupils: Pupils are equal, round, and reactive to light.   Cardiovascular:      Rate and Rhythm: Normal rate and regular rhythm.   Pulmonary:      Effort: Pulmonary effort is normal. No respiratory distress.      Breath sounds: Normal breath sounds.   Abdominal:      General: There is no distension.      Palpations: Abdomen is soft.      Tenderness: There is no abdominal tenderness.   Musculoskeletal:         General: No swelling or deformity.      Cervical back: Normal range of motion.   Skin:     General: Skin is warm and dry.   Neurological:      General: No focal deficit present.      Mental Status: She is alert and oriented to person, place, and time.         Results Review:    Lab work and CT scan from outside emergency room reviewed    CT scan of the abdomen and pelvis on 3/1/2024:  1.  Mild inflammatory stranding adjacent to the pancreas could represent interstitial edematous pancreatitis.2.  When compared to the prior exam on 01/23/2024, small to moderate volume ascites has worsened from prior with suspected early/developing loculation of the ascites. Additionally, there are new peripancreatic fluid collections along the greater curvature of the stomach and between the lesser curvature of the stomach and the pancreatic tail as above. Other peripancreatic fluid collection has decreased in size from prior exam.3.  Mild peritoneal thickening may be related to be the loculating fluid or peritonitis.4.  Marked gastric wall thickening suggestive of gastritis.5.  Cardiomegaly and small left pleural effusion.      I reviewed the patient's new clinical results.  I reviewed the patient's new imaging results and agree with the interpretation.        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       Assessment & Plan    The patient has been directly admitted from the outside emergency room.  At present she reports improvement of her abdominal pain and is asking if she can eat.  Prior to doing so we will ask gastroenterology to evaluate.  Allow sips with oral medications.  Restart her antihypertensives given uncontrolled blood pressure presently.  Initiate pain control regimen.  Consult nephrology for her end-stage renal disease.  States her last dialysis session was on Thursday.  Check CBC, CMP, lipase.  Additional plans based on her clinical course.    I discussed the patients findings and my recommendations with patient and nursing staff      Bobby Carter MD  03/02/24  11:19 EST              Electronically signed by Bobby Carter MD at 03/02/24 1126       Facility-Administered Medications as of 3/3/2024   Medication Dose Route Frequency Provider Last Rate Last Admin    acetaminophen (TYLENOL) tablet 650 mg  650 mg Oral Q4H PRN Bobby Carter MD        Or    acetaminophen (TYLENOL) 160 MG/5ML oral solution 650 mg  650 mg Oral Q4H PRN Bobby Carter MD        Or    acetaminophen (TYLENOL) suppository 650 mg  650 mg Rectal Q4H PRN Bobby Carter MD        aspirin chewable tablet 81 mg  81 mg Oral Daily Bobby Carter MD   81 mg at 03/02/24 1149    sennosides-docusate (PERICOLACE) 8.6-50 MG per tablet 2 tablet  2 tablet Oral BID PRN Bobby Carter MD        And    polyethylene glycol (MIRALAX) packet 17 g  17 g Oral Daily PRN Bobby Carter  MD        And    bisacodyl (DULCOLAX) EC tablet 5 mg  5 mg Oral Daily PRN Bobby Carter MD        And    bisacodyl (DULCOLAX) suppository 10 mg  10 mg Rectal Daily PRN Bobby Carter MD        carvedilol (COREG) tablet 25 mg  25 mg Oral Q12H Bobby Carter MD   25 mg at 03/02/24 2341    Diclofenac Sodium (VOLTAREN) 1 % gel 4 g  4 g Topical TID Bobby Carter MD   4 g at 03/02/24 1522    escitalopram (LEXAPRO) tablet 10 mg  10 mg Oral Daily Bobby Carter MD   10 mg at 03/02/24 1319    folic acid (FOLVITE) tablet 1 mg  1 mg Oral Daily Bobby Carter MD   1 mg at 03/02/24 1319    heparin (porcine) 5000 UNIT/ML injection 5,000 Units  5,000 Units Subcutaneous Q8H Bobby Carter MD   5,000 Units at 03/03/24 0644    heparin (porcine) injection 4,000 Units  4,000 Units Intracatheter PRN Bobby Carter MD   4,000 Units at 03/02/24 2333    hydrALAZINE (APRESOLINE) injection 10 mg  10 mg Intravenous Q6H PRN Bobby Carter MD   10 mg at 03/02/24 1150    hydrALAZINE (APRESOLINE) tablet 25 mg  25 mg Oral TID Bobby Carter MD   25 mg at 03/02/24 2341    HYDROmorphone (DILAUDID) injection 0.5 mg  0.5 mg Intravenous Q4H PRN Bobby Carter MD   0.5 mg at 03/03/24 0345    hydroxychloroquine (PLAQUENIL) tablet 200 mg  200 mg Oral Daily Bobby Carter MD   200 mg at 03/02/24 1319    lacosamide (VIMPAT) tablet 100 mg  100 mg Oral Q12H Bobby Carter MD   100 mg at 03/02/24 2342    lisinopril (PRINIVIL,ZESTRIL) tablet 10 mg  10 mg Oral Q24H Bobby Carter MD   10 mg at 03/02/24 1319    mycophenolate (CELLCEPT) capsule 250 mg  250 mg Oral Q12H Bobby Carter MD   250 mg at 03/02/24 2341    nitroglycerin (NITROSTAT) SL tablet 0.4 mg  0.4 mg Sublingual Q5 Min PRN Bobby Carter MD        ondansetron (ZOFRAN) injection 4 mg  4 mg Intravenous Q6H PRN  Bobby Carter MD        oxyCODONE-acetaminophen (PERCOCET) 5-325 MG per tablet 1 tablet  1 tablet Oral Q4H PRN Bobby Carter MD   1 tablet at 03/02/24 1149    sodium chloride 0.9 % flush 10 mL  10 mL Intravenous Q12H Bobby Carter MD   10 mL at 03/02/24 2343    sodium chloride 0.9 % flush 10 mL  10 mL Intravenous PRN Bobby Carter MD        sodium chloride 0.9 % infusion 40 mL  40 mL Intravenous PRN Bobby Carter MD         Lab Results (last 24 hours)       Procedure Component Value Units Date/Time    High Sensitivity Troponin T [[id number]]  (Abnormal) Collected: 03/03/24 0355    Specimen: Blood Updated: 03/03/24 0511     HS Troponin T 139 ng/L     Narrative:      High Sensitive Troponin T Reference Range:  <14.0 ng/L- Negative Female for AMI  <22.0 ng/L- Negative Male for AMI  >=14 - Abnormal Female indicating possible myocardial injury.  >=22 - Abnormal Male indicating possible myocardial injury.   Clinicians would have to utilize clinical acumen, EKG, Troponin, and serial changes to determine if it is an Acute Myocardial Infarction or myocardial injury due to an underlying chronic condition.         Ferritin [[id number]]  (Abnormal) Collected: 03/03/24 0355    Specimen: Blood Updated: 03/03/24 0509     Ferritin 2,783.00 ng/mL     Narrative:      Results may be falsely decreased if patient taking Biotin.      CBC & Differential [[id number]]  (Abnormal) Collected: 03/03/24 0355    Specimen: Blood Updated: 03/03/24 0501    Narrative:      The following orders were created for panel order CBC & Differential.  Procedure                               Abnormality         Status                     ---------                               -----------         ------                     CBC Auto Differential[[id number]]        Abnormal            Final result                 Please view results for these tests on the individual orders.    CBC Auto Differential  [[id number]]  (Abnormal) Collected: 03/03/24 0355    Specimen: Blood Updated: 03/03/24 0501     WBC 4.57 10*3/mm3      RBC 4.10 10*6/mm3      Hemoglobin 9.8 g/dL      Hematocrit 32.0 %      MCV 78.0 fL      MCH 23.9 pg      MCHC 30.6 g/dL      RDW 17.8 %      RDW-SD 49.1 fl      MPV --     Comment: Unable to calculate          Platelets 167 10*3/mm3      Neutrophil % 69.4 %      Lymphocyte % 14.9 %      Monocyte % 13.8 %      Eosinophil % 1.3 %      Basophil % 0.2 %      Neutrophils, Absolute 3.17 10*3/mm3      Lymphocytes, Absolute 0.68 10*3/mm3      Monocytes, Absolute 0.63 10*3/mm3      Eosinophils, Absolute 0.06 10*3/mm3      Basophils, Absolute 0.01 10*3/mm3     Renal Function Panel [[id number]]  (Abnormal) Collected: 03/03/24 0355    Specimen: Blood Updated: 03/03/24 0500     Glucose 85 mg/dL      BUN 4 mg/dL      Creatinine 3.10 mg/dL      Sodium 136 mmol/L      Potassium 3.8 mmol/L      Comment: Slight hemolysis detected by analyzer. Result may be falsely elevated.        Chloride 102 mmol/L      CO2 26.0 mmol/L      Calcium 8.0 mg/dL      Albumin 2.6 g/dL      Phosphorus 2.0 mg/dL      Anion Gap 8.0 mmol/L      BUN/Creatinine Ratio 1.3     eGFR 19.9 mL/min/1.73     Narrative:      GFR Normal >60  Chronic Kidney Disease <60  Kidney Failure <15      Iron Profile [[id number]]  (Abnormal) Collected: 03/03/24 0355    Specimen: Blood Updated: 03/03/24 0447     Iron 38 mcg/dL      Iron Saturation (TSAT) 29 %      Transferrin 89 mg/dL      TIBC 133 mcg/dL     Magnesium [[id number]]  (Normal) Collected: 03/03/24 0355    Specimen: Blood Updated: 03/03/24 0447     Magnesium 1.7 mg/dL     GIANFRANCO Comprehensive Panel [[id number]] Collected: 03/02/24 1313    Specimen: Blood Updated: 03/02/24 1326    IgG 4 [[id number]] Collected: 03/02/24 1313    Specimen: Blood Updated: 03/02/24 1326    CBC & Differential [[id number]]  (Abnormal) Collected: 03/02/24 1138    Specimen: Blood Updated: 03/02/24 1217    Narrative:      The following  orders were created for panel order CBC & Differential.  Procedure                               Abnormality         Status                     ---------                               -----------         ------                     CBC Auto Differential[[id number]]        Abnormal            Final result                 Please view results for these tests on the individual orders.    CBC Auto Differential [[id number]]  (Abnormal) Collected: 03/02/24 1138    Specimen: Blood Updated: 03/02/24 1217     WBC 4.61 10*3/mm3      RBC 2.74 10*6/mm3      Hemoglobin 6.0 g/dL      Hematocrit 21.4 %      MCV 78.1 fL      MCH 21.9 pg      MCHC 28.0 g/dL      RDW 18.2 %      RDW-SD 47.6 fl      Platelets 190 10*3/mm3      Neutrophil % 66.2 %      Lymphocyte % 15.6 %      Monocyte % 16.7 %      Eosinophil % 1.1 %      Basophil % 0.2 %      Neutrophils, Absolute 3.05 10*3/mm3      Lymphocytes, Absolute 0.72 10*3/mm3      Monocytes, Absolute 0.77 10*3/mm3      Eosinophils, Absolute 0.05 10*3/mm3      Basophils, Absolute 0.01 10*3/mm3     Comprehensive Metabolic Panel [[id number]]  (Abnormal) Collected: 03/02/24 1138    Specimen: Blood Updated: 03/02/24 1207     Glucose 83 mg/dL      BUN 15 mg/dL      Creatinine 7.00 mg/dL      Sodium 138 mmol/L      Potassium 3.6 mmol/L      Chloride 100 mmol/L      CO2 27.0 mmol/L      Calcium 8.1 mg/dL      Total Protein 6.1 g/dL      Albumin 2.5 g/dL      ALT (SGPT) 9 U/L      AST (SGOT) 21 U/L      Alkaline Phosphatase 54 U/L      Total Bilirubin 0.5 mg/dL      Globulin 3.6 gm/dL      A/G Ratio 0.7 g/dL      BUN/Creatinine Ratio 2.1     Anion Gap 11.0 mmol/L      eGFR 7.5 mL/min/1.73      Comment: <15 Indicative of kidney failure       Narrative:      GFR Normal >60  Chronic Kidney Disease <60  Kidney Failure <15      Lipase [[id number]]  (Abnormal) Collected: 03/02/24 1138    Specimen: Blood Updated: 03/02/24 1207     Lipase 161 U/L           Imaging Results (Last 24 Hours)       ** No results found  for the last 24 hours. **          ECG/EMG Results (last 24 hours)       Procedure Component Value Units Date/Time    ECG 12 Lead Chest Pain [[id number]] Collected: 03/03/24 0357     Updated: 03/03/24 0358     QT Interval 374 ms      QTC Interval 485 ms     Narrative:      HEART RATE= 101  bpm  RR Interval= 594  ms  TN Interval= 165  ms  P Horizontal Axis= -38  deg  P Front Axis= 48  deg  QRSD Interval= 97  ms  QT Interval= 374  ms  QTcB= 485  ms  QRS Axis= -56  deg  T Wave Axis= 76  deg  - ABNORMAL ECG -  Sinus tachycardia  Left atrial enlargement  Left anterior fascicular block  LVH with secondary repolarization abnormality  Borderline prolonged QT interval  Electronically Signed By:   Date and Time of Study: 2024-03-03 03:57:39          Orders (last 24 hrs)        Start     Ordered    03/03/24 0600  Basic Metabolic Panel  Morning Draw,   Status:  Canceled         03/02/24 1053    03/03/24 0600  CBC & Differential  Morning Draw         03/02/24 1053    03/03/24 0600  Renal Function Panel  Morning Draw         03/02/24 1253    03/03/24 0600  Iron Profile  Morning Draw         03/02/24 1253    03/03/24 0600  Ferritin  Morning Draw         03/02/24 1253    03/03/24 0600  CBC Auto Differential  PROCEDURE ONCE         03/02/24 2202    03/03/24 0555  High Sensitivity Troponin T 2Hr  PROCEDURE ONCE         03/03/24 0511    03/03/24 0425  Manual Differential  Once,   Status:  Canceled         03/03/24 0424    03/03/24 0354  Initiate & Follow Hypercapnic Monitoring Guideline for Opioid Administration via EtCO2 and / or SpO2  Continuous        Comments: Follow Hypercapnic Monitoring Guideline As Outlined in Process Instructions (Open Order Report to View Full Instructions)    03/03/24 0353    03/03/24 0354  Opioid Administration - Document EtCO2 and / or SpO2 With Each Set of Vitals & Any Change in Patient Status  Per Order Details        Comments: With Each Set of Vitals & Any Change in Patient Status    03/03/24 0353     03/03/24 0354  Opioid Administration - Notify Provider Hypercapnic Monitoring  Continuous        Comments: Open Order Report to View Parameters Requiring Provider Notification    03/03/24 0353    03/03/24 0354  Opioid Administration - Continuous Pulse Oximetry (SpO2)  Continuous         03/03/24 0353    03/03/24 0349  ECG 12 Lead Chest Pain  STAT         03/03/24 0348    03/03/24 0349  Potassium  STAT,   Status:  Canceled         03/03/24 0348    03/03/24 0349  Magnesium  STAT         03/03/24 0348    03/03/24 0349  High Sensitivity Troponin T  STAT         03/03/24 0348    03/03/24 0342  Continuous Cardiac Monitoring  Continuous        Comments: Follow Standing Orders As Outlined in Process Instructions (Open Order Report to View Full Instructions)    03/03/24 0341    03/03/24 0342  Telemetry - Maintain IV Access  Continuous         03/03/24 0341    03/03/24 0342  Telemetry - Place Orders & Notify Provider of Results When Patient Experiences Acute Chest Pain, Dysrhythmia or Respiratory Distress  Continuous        Comments: Open Order Report to View Parameters Requiring Provider Notification    03/03/24 0341    03/03/24 0342  May Be Off Telemetry for Tests  Continuous         03/03/24 0341    03/03/24 0341  nitroglycerin (NITROSTAT) SL tablet 0.4 mg  Every 5 Minutes PRN         03/03/24 0341    03/02/24 2005  heparin (porcine) injection 4,000 Units  As Needed         03/02/24 2007 03/02/24 1800  Oral Care  2 Times Daily       03/02/24 1053    03/02/24 1600  Diclofenac Sodium (VOLTAREN) 1 % gel 4 g  3 Times Daily         03/02/24 1122    03/02/24 1600  hydrALAZINE (APRESOLINE) tablet 25 mg  3 Times Daily         03/02/24 1122    03/02/24 1400  heparin (porcine) 5000 UNIT/ML injection 5,000 Units  Every 8 Hours Scheduled         03/02/24 1053    03/02/24 1302  GIANFRANCO Comprehensive Panel  Once         03/02/24 1301    03/02/24 1302  IgG 4  Once         03/02/24 1301    03/02/24 1254  Please notify dialysis about change  in order (3K/30 bicarb bath, no UF, transfusion etc)  Nursing Communication  Once        Comments: Please notify dialysis about change in order (3K/30 bicarb bath, no UF, transfusion etc)    03/02/24 1253    03/02/24 1253  Hemodialysis Inpatient  Once        Comments: Lock catheter with heparin; transfuse 2u PRBC on dialysis    03/02/24 1253    03/02/24 1238  Verify Informed Consent for Blood Product Administration  Once         03/02/24 1237    03/02/24 1238  Prepare RBC, 2 Units  Blood - Once        Comments: Give on dialysis      03/02/24 1237    03/02/24 1238  Type & Screen  Once         03/02/24 1237    03/02/24 1237  Transfuse RBC, 2 Units Infuse Each Unit Over: 3.5H  Transfusion       03/02/24 1237    03/02/24 1227  Diet: Gastrointestinal; Fat-Restricted; Fluid Consistency: Thin (IDDSI 0)  Diet Effective Now         03/02/24 1227    03/02/24 1215  aspirin chewable tablet 81 mg  Daily         03/02/24 1122    03/02/24 1215  carvedilol (COREG) tablet 25 mg  Every 12 Hours Scheduled         03/02/24 1122    03/02/24 1215  escitalopram (LEXAPRO) tablet 10 mg  Daily         03/02/24 1122    03/02/24 1215  folic acid (FOLVITE) tablet 1 mg  Daily         03/02/24 1122    03/02/24 1215  lacosamide (VIMPAT) tablet 100 mg  Every 12 Hours Scheduled         03/02/24 1122    03/02/24 1215  lisinopril (PRINIVIL,ZESTRIL) tablet 10 mg  Every 24 Hours Scheduled         03/02/24 1122    03/02/24 1215  mycophenolate (CELLCEPT) capsule 250 mg  Every 12 Hours Scheduled         03/02/24 1122    03/02/24 1215  hydroxychloroquine (PLAQUENIL) tablet 200 mg  Daily         03/02/24 1122    03/02/24 1200  Vital Signs  Every 4 Hours      Comments: Per per hospital policy    03/02/24 1053    03/02/24 1151  Manual Differential  Once,   Status:  Canceled         03/02/24 1150    03/02/24 1145  sodium chloride 0.9 % flush 10 mL  Every 12 Hours Scheduled         03/02/24 1053    03/02/24 1142  HYDROmorphone (DILAUDID) injection 0.5 mg  Every  "4 Hours PRN         03/02/24 1142    03/02/24 1126  morphine injection 2 mg  Every 4 Hours PRN,   Status:  Discontinued         03/02/24 1127    03/02/24 1122  oxyCODONE-acetaminophen (PERCOCET) 5-325 MG per tablet 1 tablet  Every 4 Hours PRN         03/02/24 1122    03/02/24 1119  hydrALAZINE (APRESOLINE) injection 10 mg  Every 6 Hours PRN         03/02/24 1120    03/02/24 1107  Hemodialysis Inpatient  Once,   Status:  Canceled        Comments: If access is catheter can lock with heparin    03/02/24 1107    03/02/24 1100  Nephrology consult noted.  Please msg me re: when was last dialysis?  May be a bit before I can see her.  Nursing Communication  Once        Comments: Nephrology consult noted.  Please msg me re: when was last dialysis?  May be a bit before I can see her.    03/02/24 1100    03/02/24 1055  Lipase  STAT         03/02/24 1054    03/02/24 1054  Cardiac Monitoring  Continuous,   Status:  Canceled        Comments: Follow Standing Orders As Outlined in Process Instructions (Open Order Report to View Full Instructions)    03/02/24 1053    03/02/24 1054  CBC & Differential  STAT         03/02/24 1054    03/02/24 1054  Comprehensive Metabolic Panel  STAT         03/02/24 1054    03/02/24 1054  CBC Auto Differential  PROCEDURE ONCE         03/02/24 1054    03/02/24 1053  ondansetron (ZOFRAN) injection 4 mg  Every 6 Hours PRN         03/02/24 1053    03/02/24 1053  sennosides-docusate (PERICOLACE) 8.6-50 MG per tablet 2 tablet  2 Times Daily PRN        Placed in \"And\" Linked Group    03/02/24 1053    03/02/24 1053  polyethylene glycol (MIRALAX) packet 17 g  Daily PRN        Placed in \"And\" Linked Group    03/02/24 1053    03/02/24 1053  bisacodyl (DULCOLAX) EC tablet 5 mg  Daily PRN        Placed in \"And\" Linked Group    03/02/24 1053    03/02/24 1053  bisacodyl (DULCOLAX) suppository 10 mg  Daily PRN        Placed in \"And\" Linked Group    03/02/24 1053    03/02/24 1053  acetaminophen (TYLENOL) tablet 650 mg " " Every 4 Hours PRN        Placed in \"Or\" Linked Group    03/02/24 1053    03/02/24 1053  acetaminophen (TYLENOL) 160 MG/5ML oral solution 650 mg  Every 4 Hours PRN        Placed in \"Or\" Linked Group    03/02/24 1053    03/02/24 1053  acetaminophen (TYLENOL) suppository 650 mg  Every 4 Hours PRN        Placed in \"Or\" Linked Group    03/02/24 1053    03/02/24 1053  Code Status and Medical Interventions:  Continuous         03/02/24 1053    03/02/24 1053  NPO Diet NPO Type: Sips with Meds  Diet Effective Now,   Status:  Canceled         03/02/24 1053    03/02/24 1053  Inpatient Gastroenterology Consult  Once        Specialty:  Gastroenterology  Provider:  Nickolas Villa MD    03/02/24 1053    03/02/24 1053  Inpatient Nephrology Consult  Once        Specialty:  Nephrology  Provider:  Eduardo Hendricks MD    03/02/24 1053    03/02/24 1052  Ambulate Patient  Every Shift       03/02/24 1053    03/02/24 1052  Intake & Output  Every Shift       03/02/24 1053    03/02/24 1052  Weigh patient  Once         03/02/24 1053    03/02/24 1052  Insert Peripheral IV  Once         03/02/24 1053    03/02/24 1052  Saline Lock & Maintain IV Access  Continuous,   Status:  Canceled         03/02/24 1053    03/02/24 1052  Inpatient Admission  Once         03/02/24 1053    03/02/24 1051  sodium chloride 0.9 % flush 10 mL  As Needed         03/02/24 1053    03/02/24 1051  sodium chloride 0.9 % infusion 40 mL  As Needed         03/02/24 1053    Unscheduled  Up in Chair  As Needed       03/02/24 1053                  Operative/Procedure Notes (last 24 hours)  Notes from 03/02/24 0903 through 03/03/24 0903   No notes of this type exist for this encounter.       Physician Progress Notes (last 24 hours)  Notes from 03/02/24 0903 through 03/03/24 0903   No notes of this type exist for this encounter.          Consult Notes (last 24 hours)        Lance Martínez MD at 03/02/24 1233        Consult Orders    1. Inpatient " Nephrology Consult [[id number]] ordered by Bobby Carter MD at 24 1053                   [facility] Consult Note      Patient Name: Dee Herrera  : 1990  MRN: [id number]  Primary Care Physician:  Margarita Woods, CAESAR  Referring Physician: No Known Provider  Date of admission: 3/2/2024    Subjective     Reason for Consult:  ESRD     HPI:   Dee Herrera is a 33 y.o. female with ESRD (s/p PD in past, now IHD TTS via [facility] TDC at [facility] unit), SLE on IS (cellcept, plaquenil), HTN, seizure disorder, PVD s/p ascending aortic aneurysm repair who presented to outside ER with abd pain, n/v.  Found to have acute pancreatitis on CT with peripancreatic fluid collections and small to mod ascites. Last dialyzed on THURS.  Severely anemic, hgb 6.0.  Denies melena but recalls low hgb at dialysis lately.  Lipase is 160.  Albumin low 2.5.  K/HCo3 normal 3.6 and BUN/Cr 15/7.0.  She was hosp  to 24 here for pancreatitis, etiol of which was unclear.  Denies ETOH use/abuse.  GI has seen her and has approved diet, and she's asking to eat.  Denies dyspnea.  She apparently has refused to have AVF placed, does not provide rationale.      Review of Systems:   14 point review of systems is otherwise negative except for mentioned above on HPI    Personal History     Past Medical History:   Diagnosis Date    Anasarca     PER CT SCAN    Anxiety     CHF (congestive heart failure)     Dry skin     ESRD (end stage renal disease) on dialysis     TUES, THURS, SAT [facility]    History of abdominal pain     History of anemia     History of transfusion     Hypertension     Iron deficiency anemia 2021    Lupus (systemic lupus erythematosus) 2022    Migraine     Other specified nutritional anemias     Pancreatitis     Pericardial effusion     Renal insufficiency     Scoliosis     Seizures     STATES LAST WAS 2023    Shortness of breath     OCCASIONAL     Vitamin D deficiency 09/27/2021       Past Surgical History:   Procedure Laterality Date    ASCENDING AORTIC ANEURYSM REPAIR W/ MECHANICAL AORTIC VALVE REPLACEMENT N/A 11/2/2023    Procedure: CHETNA STERNOTOMY, AORTIC ROOT REPLACEMENT WITH VALVE SPARING MISHEL PROCEDURE, REPLACEMENT OF ASCENDING AORTA, RIGHT FEMORAL DIALYSIS CATHETER PLACEMENT AND PRP;  Surgeon: Chris Medina MD;  Location: [facility];  Service: Cardiothoracic;  Laterality: N/A;    CARDIAC CATHETERIZATION N/A 06/14/2023    Procedure: Coronary angiography;  Surgeon: Juana Taylor MD;  Location: [facility] CATH INVASIVE LOCATION;  Service: Cardiovascular;  Laterality: N/A;    CARDIAC CATHETERIZATION N/A 06/14/2023    Procedure: Left heart cath;  Surgeon: Juana Taylor MD;  Location: [facility] CATH INVASIVE LOCATION;  Service: Cardiovascular;  Laterality: N/A;    CARDIAC CATHETERIZATION N/A 06/14/2023    Procedure: Right Heart Cath;  Surgeon: Juana Taylor MD;  Location: [facility] CATH INVASIVE LOCATION;  Service: Cardiovascular;  Laterality: N/A;    COLONOSCOPY N/A 7/20/2023    Procedure: COLONOSCOPY to cecum with biopsy;  Surgeon: Drew Kaminski MD;  Location: [facility] ENDOSCOPY;  Service: Gastroenterology;  Laterality: N/A;  PRE - diarrhea, constipation  POST - fair prep, normal    CORONARY ARTERY BYPASS GRAFT N/A 11/6/2023    Procedure: STERNAL EXPLORATION AND WASH OUT;  Surgeon: Jr Mitesh Quiroz MD;  Location: [facility];  Service: Cardiothoracic;  Laterality: N/A;    ENDOSCOPY N/A 7/20/2023    Procedure: ESOPHAGOGASTRODUODENOSCOPY with biopsy;  Surgeon: Drew Kaminski MD;  Location: [facility] ENDOSCOPY;  Service: Gastroenterology;  Laterality: N/A;  PRE - abn ct abd  POST - gastritis    INSERTION HEMODIALYSIS CATHETER N/A 07/26/2022    Procedure: RIGHT TUNNELED DIALYSIS CATHETER PLACEMENT;  Surgeon: Diandra Adhikari MD;  Location: [facility] MAIN OR;  Service: Vascular;  Laterality: N/A;    INSERTION HEMODIALYSIS CATHETER Left 11/29/2023     Procedure: TUNNELED DIALYSIS CATHETER PLACEMENT;  Surgeon: Jose Patel II, MD;  Location: [facility] MAIN OR;  Service: Vascular;  Laterality: Left;    INSERTION PERITONEAL DIALYSIS CATHETER N/A 04/03/2023    Procedure: INSERTION PERITONEAL DIALYSIS CATHETER LAPAROSCOPIC, omentumpexy;  Surgeon: Jemal Loyola MD;  Location: [facility] MAIN OR;  Service: General;  Laterality: N/A;    REMOVAL PERITONEAL DIALYSIS CATHETER N/A 12/1/2023    Procedure: REMOVAL PERITONEAL DIALYSIS CATHETER;  Surgeon: Maryellen Ashton MD;  Location: [facility] MAIN OR;  Service: General;  Laterality: N/A;    TONSILLECTOMY         Family History: family history includes Anemia in her brother and mother; Autoimmune disease in her mother; Cancer in her maternal grandmother; Diabetes in her maternal grandmother and sister; Hypertension in her maternal grandmother; Sickle cell anemia in her cousin.    Social History:  reports that she has been smoking cigars. She has been exposed to tobacco smoke. She has never used smokeless tobacco. She reports current alcohol use of about 8.0 standard drinks of alcohol per week. She reports current drug use. Drugs: Marijuana and Oxycodone.    Home Medications:  Prior to Admission medications    Medication Sig Start Date End Date Taking? Authorizing Provider   carvedilol (COREG) 25 MG tablet Take 1 tablet by mouth Every 12 (Twelve) Hours. 2/18/23  Yes Hernan Corcoran,    Diclofenac Sodium (VOLTAREN) 1 % gel gel Apply 4 g topically to the appropriate area as directed 3 (Three) Times a Day. 2/6/24  Yes Margarita Woods APRN   escitalopram (LEXAPRO) 10 MG tablet Take 1 tablet by mouth Daily. 12/10/23  Yes Bobby Carter MD   folic acid (FOLVITE) 1 MG tablet Take 1 tablet by mouth Daily. 1/10/24  Yes Margarita Woods APRN   hydrALAZINE (APRESOLINE) 25 MG tablet Take 1 tablet by mouth 3 (Three) Times a Day. 2/15/24  Yes Ivet Manzano MD   lacosamide (VIMPAT) 100 MG tablet tablet Take 1  tablet by mouth Every 12 (Twelve) Hours. 12/9/23  Yes Bobby Carter MD   lisinopril (PRINIVIL,ZESTRIL) 10 MG tablet Take 1 tablet by mouth Daily. 12/10/23  Yes Bobby Carter MD   mycophenolate (CELLCEPT) 500 MG tablet Take 0.5 tablets by mouth Every 12 (Twelve) Hours. 2/18/23  Yes Hernan Corcoran DO   ondansetron (Zofran) 4 MG tablet Take 1 tablet by mouth Every 8 (Eight) Hours As Needed for Nausea or Vomiting. 4/3/23 4/2/24 Yes Jemal Loyola MD   ondansetron ODT (ZOFRAN-ODT) 4 MG disintegrating tablet Place 1 tablet on the tongue Every 8 (Eight) Hours As Needed for Vomiting or Nausea. 2/7/24  Yes Lidia Andre APRN   oxyCODONE-acetaminophen (PERCOCET) 5-325 MG per tablet Take 1 tablet by mouth Every 4 (Four) Hours As Needed for Moderate Pain. 2/7/24  Yes Lidia Andre APRN   polyethylene glycol (MIRALAX) 17 GM/SCOOP powder Take 17 g by mouth As Needed. 5/23/23  Yes Ivet Manzano MD   aspirin 81 MG chewable tablet Chew 1 tablet Daily. 12/10/23   Bobby Carter MD   hydroxychloroquine (PLAQUENIL) 200 MG tablet Take 1 tablet by mouth Daily. 11/22/22   Ivet Manzano MD   oxyCODONE (ROXICODONE) 5 MG immediate release tablet Take 1 tablet by mouth Every 6 (Six) Hours As Needed for Severe Pain. 9/14/23 3/2/24  Ivet Manzano MD       Allergies:  Allergies   Allergen Reactions    Minoxidil Hives and Other (See Comments)     Pericardial effusion .       Objective     Vitals:   Temp:  [99.3 °F (37.4 °C)] 99.3 °F (37.4 °C)  Heart Rate:  [98] 98  Resp:  [18] 18  BP: (164-165)/(125-127) 165/125  No intake or output data in the 24 hours ending 03/02/24 1233    Physical Exam:    General Appearance: chronically ill but pleasant AAF who looks older than age, no distress  Skin: warm and dry  HEENT: oral mucosa normal, nonicteric sclera  Neck: [facility] TDC, no JVD  Lungs: CTA bilat no rales  Heart: RRR, normal S1 and S2  Abdomen: mild epigastric TTP, nondistended   :  no palpable bladder  Extremities: no edema, cyanosis or clubbing  Neuro: normal speech and mental status     Scheduled Meds:     aspirin, 81 mg, Oral, Daily  carvedilol, 25 mg, Oral, Q12H  Diclofenac Sodium, 4 g, Topical, TID  escitalopram, 10 mg, Oral, Daily  folic acid, 1 mg, Oral, Daily  heparin (porcine), 5,000 Units, Subcutaneous, Q8H  hydrALAZINE, 25 mg, Oral, TID  hydroxychloroquine, 200 mg, Oral, Daily  lacosamide, 100 mg, Oral, Q12H  lisinopril, 10 mg, Oral, Q24H  mycophenolate, 250 mg, Oral, Q12H  sodium chloride, 10 mL, Intravenous, Q12H      IV Meds:        Results Reviewed:   I have personally reviewed the results from the time of this admission to 3/2/2024 12:33 EST     Lab Results   Component Value Date    GLUCOSE 83 03/02/2024    CALCIUM 8.1 (L) 03/02/2024     03/02/2024    K 3.6 03/02/2024    CO2 27.0 03/02/2024     03/02/2024    BUN 15 03/02/2024    CREATININE 7.00 (H) 03/02/2024    EGFRIFAFRI 8 (L) 03/20/2023    BCR 2.1 (L) 03/02/2024    ANIONGAP 11.0 03/02/2024      Lab Results   Component Value Date    MG 1.9 01/23/2024    PHOS 4.1 01/27/2024    ALBUMIN 2.5 (L) 03/02/2024           Assessment / Plan     ASSESSMENT:  ESRD - HD TTS at [facility].  Last dialysis THURS.  K/HCO3 normal.  Waste products acceptable.  Likely vol depleted on basis of acute pancreatitis, w/o dyspnea/edema.  Access is [facility] TD   Recurrent acute pancreatitis, 2nd episode in 3 mos.  GI eval noted.  Etiol unclear.  Note plan to check GIANFRANCO & IgG4  Anemia of CKD (& ABL?) - hgb down to 6.0; no melena.  Had EGD/colonoscopy year ago, former showing gastritis.  Note possible need for repeat EGD   HTN - BP elevated.  No vol excess contributory.  On coreg, lisinopril, low dose hydralazine.  Appears norvasc stopped recent admission due to excessive BP control  SLE on IS (cellcept, plaquenil); no assoc leukopenia   MBD, unsure if on renvela, check phos in AM, may be low with n/v and poor PO intake     PLAN:  HD today w/o  ultrafiltration  Transfuse 2u PRBC on dialysis   Refrain from IVF, GI to allow diet resumption  Will request BP update post dialysis, room to titrate ACEi or hydralazine, favor former     Thank you for involving us in the care of Dee Herrera.  Please feel free to call with any questions.    Lance Martínez MD  03/02/24  12:33 EST    [facility]  [phone]    Electronically signed by Lance Martínez MD at 03/02/24 1252       Vince Pittman MD at 03/02/24 1206          Gastroenterology   Initial Inpatient Consult Note    Referring Provider: Bobby Carter    Reason for Consultation: pancreatitis, anemia    Subjective     History of present illness:    33 y.o. female patient of Dr. Drew Kaminski known to our service.  Last in the hospital January 27, 2024.    Patient has a history of end-stage renal disease on hemodialysis.  This was her first episode in January 2024.  During that time her ultrasound of the abdomen was negative right upper quadrant as was her triglyceride level    History of alcohol in the distant past.    Lipase elevated at 161    This admission patient endorses complaints of abdominal pain and vomiting  CT Abdomen Pelvis Without Contrast (03/01/2024 16:02) mild inflammatory stranding adjacent to the pancreas.  When compared to the prior exam some ascites have increased with suspected early developing loculation of the ascites.  New peripancreatic fluid collections along the greater curve of the stomach and between the lesser curve of the stomach and the pancreatic tail as above other peripancreatic fluid collections has decreased in size from the prior exam mild peritoneal thickening may be related to the loculating fluid or peritonitis marked gastric wall thickening suggestive of gastritis    COLONOSCOPY (07/20/2023 16:39) nl  UPPER GI ENDOSCOPY (07/20/2023 16:38) gastritis    Patient is status post aortic dissection and repair, has end-stage  renal disease and chronic anemia    Her white count is normal 4.61 but her hemoglobin is 6.0.  It was 6.3 a month ago but it was 9.13 weeks ago    Patient reports feeling good.  She would like to eat.  She has no vomiting since yesterday.  She has not seen any visible blood    Past Medical History:  Past Medical History:   Diagnosis Date    Anasarca     PER CT SCAN    Anxiety     CHF (congestive heart failure)     Dry skin     ESRD (end stage renal disease) on dialysis     TUES, THZACK, SAT [facility]    History of abdominal pain     History of anemia     History of transfusion     Hypertension     Iron deficiency anemia 09/27/2021    Lupus (systemic lupus erythematosus) 07/30/2022    Migraine     Other specified nutritional anemias     Pancreatitis     Pericardial effusion     Renal insufficiency     Scoliosis     Seizures     STATES LAST WAS 1/2023    Shortness of breath     OCCASIONAL    Vitamin D deficiency 09/27/2021     Past Surgical History:  Past Surgical History:   Procedure Laterality Date    ASCENDING AORTIC ANEURYSM REPAIR W/ MECHANICAL AORTIC VALVE REPLACEMENT N/A 11/2/2023    Procedure: CHETNA STERNOTOMY, AORTIC ROOT REPLACEMENT WITH VALVE SPARING MISHEL PROCEDURE, REPLACEMENT OF ASCENDING AORTA, RIGHT FEMORAL DIALYSIS CATHETER PLACEMENT AND PRP;  Surgeon: Chris Medina MD;  Location: [facility] CVOR;  Service: Cardiothoracic;  Laterality: N/A;    CARDIAC CATHETERIZATION N/A 06/14/2023    Procedure: Coronary angiography;  Surgeon: Juana Taylor MD;  [facility];  Service: Cardiovascular;  Laterality: N/A;    CARDIAC CATHETERIZATION N/A 06/14/2023    Procedure: Left heart cath;  Surgeon: Juana Taylor MD;  [facility];  Service: Cardiovascular;  Laterality: N/A;    CARDIAC CATHETERIZATION N/A 06/14/2023    Procedure: Right Heart Cath;  Surgeon: Juana Taylor MD;  Location:  [facility] CATH INVASIVE LOCATION;  Service: Cardiovascular;   Laterality: N/A;    COLONOSCOPY N/A 7/20/2023    Procedure: COLONOSCOPY to cecum with biopsy;  Surgeon: Drew Kaminski MD;  Location: [facility] ENDOSCOPY;  Service: Gastroenterology;  Laterality: N/A;  PRE - diarrhea, constipation  POST - fair prep, normal    CORONARY ARTERY BYPASS GRAFT N/A 11/6/2023    Procedure: STERNAL EXPLORATION AND WASH OUT;  Surgeon: Jr Mitesh Quiroz MD;  Location: [facility] CVOR;  Service: Cardiothoracic;  Laterality: N/A;    ENDOSCOPY N/A 7/20/2023    Procedure: ESOPHAGOGASTRODUODENOSCOPY with biopsy;  Surgeon: Drew Kaminski MD;  Location: [facility] ENDOSCOPY;  Service: Gastroenterology;  Laterality: N/A;  PRE - abn ct abd  POST - gastritis    INSERTION HEMODIALYSIS CATHETER N/A 07/26/2022    Procedure: RIGHT TUNNELED DIALYSIS CATHETER PLACEMENT;  Surgeon: Diandra Adhikari MD;  Location: [facility] MAIN OR;  Service: Vascular;  Laterality: N/A;    INSERTION HEMODIALYSIS CATHETER Left 11/29/2023    Procedure: TUNNELED DIALYSIS CATHETER PLACEMENT;  Surgeon: Jose Patel II, MD;  Location: [facility] MAIN OR;  Service: Vascular;  Laterality: Left;    INSERTION PERITONEAL DIALYSIS CATHETER N/A 04/03/2023    Procedure: INSERTION PERITONEAL DIALYSIS CATHETER LAPAROSCOPIC, omentumpexy;  Surgeon: Jemal Loyola MD;  Location: [facility] MAIN OR;  Service: General;  Laterality: N/A;    REMOVAL PERITONEAL DIALYSIS CATHETER N/A 12/1/2023    Procedure: REMOVAL PERITONEAL DIALYSIS CATHETER;  Surgeon: Maryellen Ashton MD;  Location: [facility] MAIN OR;  Service: General;  Laterality: N/A;    TONSILLECTOMY        Social History:   Social History     Tobacco Use    Smoking status: Some Days     Types: Cigars     Passive exposure: Past    Smokeless tobacco: Never    Tobacco comments:     Patient smoked black & mild   Substance Use Topics    Alcohol use: Yes     Alcohol/week: 8.0 standard drinks of alcohol     Types: 2 Glasses of wine, 4 Shots of liquor, 2 Drinks containing 0.5 oz of alcohol per week      Comment: social      Family History:  Family History   Problem Relation Age of Onset    Autoimmune disease Mother     Anemia Mother     Diabetes Sister     Anemia Brother     Diabetes Maternal Grandmother     Hypertension Maternal Grandmother     Cancer Maternal Grandmother     Sickle cell anemia Cousin     [place] Hyperthermia Neg Hx        Home Meds:  Medications Prior to Admission   Medication Sig Dispense Refill Last Dose    carvedilol (COREG) 25 MG tablet Take 1 tablet by mouth Every 12 (Twelve) Hours. 60 tablet 0 3/1/2024    Diclofenac Sodium (VOLTAREN) 1 % gel gel Apply 4 g topically to the appropriate area as directed 3 (Three) Times a Day. 150 g 0 3/1/2024    escitalopram (LEXAPRO) 10 MG tablet Take 1 tablet by mouth Daily.   3/1/2024    folic acid (FOLVITE) 1 MG tablet Take 1 tablet by mouth Daily. 90 tablet 1 3/1/2024    hydrALAZINE (APRESOLINE) 25 MG tablet Take 1 tablet by mouth 3 (Three) Times a Day.   3/1/2024    lacosamide (VIMPAT) 100 MG tablet tablet Take 1 tablet by mouth Every 12 (Twelve) Hours. 6 tablet 0 3/1/2024    lisinopril (PRINIVIL,ZESTRIL) 10 MG tablet Take 1 tablet by mouth Daily.   3/1/2024    mycophenolate (CELLCEPT) 500 MG tablet Take 0.5 tablets by mouth Every 12 (Twelve) Hours. 30 tablet 0 3/1/2024    ondansetron (Zofran) 4 MG tablet Take 1 tablet by mouth Every 8 (Eight) Hours As Needed for Nausea or Vomiting. 30 tablet 1 3/2/2024    ondansetron ODT (ZOFRAN-ODT) 4 MG disintegrating tablet Place 1 tablet on the tongue Every 8 (Eight) Hours As Needed for Vomiting or Nausea. 30 tablet 0 3/2/2024    oxyCODONE-acetaminophen (PERCOCET) 5-325 MG per tablet Take 1 tablet by mouth Every 4 (Four) Hours As Needed for Moderate Pain. 12 tablet 0 3/1/2024    polyethylene glycol (MIRALAX) 17 GM/SCOOP powder Take 17 g by mouth As Needed.   Past Week    aspirin 81 MG chewable tablet Chew 1 tablet Daily.       hydroxychloroquine (PLAQUENIL) 200 MG tablet Take 1 tablet by mouth Daily.   2/29/2024  "    Current Meds:   aspirin, 81 mg, Oral, Daily  carvedilol, 25 mg, Oral, Q12H  Diclofenac Sodium, 4 g, Topical, TID  escitalopram, 10 mg, Oral, Daily  folic acid, 1 mg, Oral, Daily  heparin (porcine), 5,000 Units, Subcutaneous, Q8H  hydrALAZINE, 25 mg, Oral, TID  hydroxychloroquine, 200 mg, Oral, Daily  lacosamide, 100 mg, Oral, Q12H  lisinopril, 10 mg, Oral, Q24H  mycophenolate, 250 mg, Oral, Q12H  sodium chloride, 10 mL, Intravenous, Q12H      Allergies:  Allergies   Allergen Reactions    Minoxidil Hives and Other (See Comments)     Pericardial effusion .     Review of Systems  Pertinent items are noted in HPI, all other systems reviewed and negative    Objective     Vital Signs  Temp:  [99.3 °F (37.4 °C)] 99.3 °F (37.4 °C)  Heart Rate:  [98] 98  Resp:  [18] 18  BP: (164-165)/(125-127) 165/125    Physical Exam:  CONSTITUTIONAL:  today's vital signs reviewed, chronically ill-appearing  EARS NOSE THROAT: trachea midline and no deformity of the nares  EYES: no scleral icterus  GASTROINTESTINAL: abdomen is soft, nontender, nondistended with normal active bowel sounds, no masses are appreciated  PSYCHIATRIC: appropriate mood and affect  RESPIRATORY: normal inspiratory effort with no increased work of breathing  NEUROLOGIC: patient is awake and alert  DERMATOLOGIC: skin is warm with no cyanosis  LYMPHATIC: no periumbilical lymphadenopathy     Results Review:              I reviewed the patient's new clinical results.              Invalid input(s): \"LABALBU\", \"PROT\"      Lab Results   Lab Value Date/Time    LIPASE 141 (H) 03/01/2024 1137    LIPASE 389 (H) 02/07/2024 1259    LIPASE 416 (H) 01/29/2024 0413    LIPASE 194 (H) 01/28/2024 0408    LIPASE 189 (H) 01/27/2024 0018    LIPASE 295 (H) 01/26/2024 1654    LIPASE 42 10/26/2023 0054    LIPASE 10 (L) 09/10/2023 1901    LIPASE 22 02/15/2023 0343    LIPASE 22 02/14/2023 2355    LIPASE 47 11/25/2022 2109    LIPASE 27 11/22/2022 1059    LIPASE 32 11/21/2022 2030 "       Radiology:  No orders to display       Assessment & Plan   Active Hospital Problems    Diagnosis     **Acute pancreatitis     ESRD (end stage renal disease)     Severe aortic valve regurgitation     Chronic diastolic CHF (congestive heart failure)     Lupus nephritis, ISN/RPS class IV     Systemic lupus erythematosus     Hypertension secondary to other renal disorders     Pancytopenia        Assessment:  Pancreatitis.  This is the second episode in 3 months.  Patient is already starting to feel better.  Minimally elevated lipase however there are some new fluid collections.  Pancreatic lesion along the greater curve of the stomach and between the lesser curve of the stomach and the pancreatic tail.  Last admission triglycerides were normal as was right upper quadrant ultrasound  Distant alcohol none recent  Chronic anemia    Plan:  Supportive therapy with IV fluids, antiemetics and pain control as appropriate  Patient is feeling better would like to start a low-fat diet  Need to monitor these fluid collections for pseudocyst formation or abscess  Continue to look for underlying causes of pancreatitis.  Calcium is 8.1 will review her medications and check an GIANFRANCO and IgG4  Will need follow-up with Dr. Kaminski after discharge for surveillance imaging of the fluid collections  Transfuse possibly during dialysis discussed with the nursing staff  EGD and colonoscopy 1 year ago.  However, possible gastritis on EGD May need to consider EGD this admission given the patient's degree of anemia      I discussed the patients findings and my recommendations with patient and primary care team.           Vince Pittman M.D.  [facility] [place] [address]  Office: [phone]       Electronically signed by Vince Pittman MD at 03/02/24 9989

## 2024-03-03 NOTE — PROGRESS NOTES
[facility] Progress Note      Patient Name: Dee Herrera  : 1990  MRN: [id number]  Primary Care Physician:  Margarita Woods APRN  Date of admission: 3/2/2024    Subjective     Interval History:   F/u ESRD    Review of Systems:   Dialyzed yesterday without UF and given 2u PRBC  Less abd pain/nausea; eating small portions  No dyspnea     Objective     Vitals:   Temp:  [98.4 °F (36.9 °C)-100 °F (37.8 °C)] 98.4 °F (36.9 °C)  Heart Rate:  [] 100  Resp:  [16-18] 16  BP: (115-161)/() 129/88    Intake/Output Summary (Last 24 hours) at 3/3/2024 1338  Last data filed at 3/2/2024 2315  Gross per 24 hour   Intake 600 ml   Output --   Net 600 ml       Physical Exam:    General Appearance: chronically ill appearing young AAF no distress  Neck: LIJ TDC, no JVD  Lungs: CTA bilat no rales  Heart: RRR, normal S1 and S2  Abdomen: soft, nontender, nondistended  Extremities: no edema, cyanosis or clubbing      Scheduled Meds:     aspirin, 81 mg, Oral, Daily  carvedilol, 25 mg, Oral, Q12H  Diclofenac Sodium, 4 g, Topical, TID  escitalopram, 10 mg, Oral, Daily  folic acid, 1 mg, Oral, Daily  heparin (porcine), 5,000 Units, Subcutaneous, Q8H  hydrALAZINE, 25 mg, Oral, TID  hydroxychloroquine, 200 mg, Oral, Daily  lacosamide, 100 mg, Oral, Q12H  lisinopril, 10 mg, Oral, Q24H  mycophenolate, 250 mg, Oral, Q12H  sodium chloride, 10 mL, Intravenous, Q12H      IV Meds:        Results Reviewed:   I have personally reviewed the results from the time of this admission to 3/3/2024 13:38 EST     Results from last 7 days   Lab Units 24  0355 24  1138   SODIUM mmol/L 136 138   POTASSIUM mmol/L 3.8 3.6   CHLORIDE mmol/L 102 100   CO2 mmol/L 26.0 27.0   BUN mg/dL 4* 15   CREATININE mg/dL 3.10* 7.00*   CALCIUM mg/dL 8.0* 8.1*   BILIRUBIN mg/dL  --  0.5   ALK PHOS U/L  --  54   ALT (SGPT) U/L  --  9   AST (SGOT) U/L  --  21   GLUCOSE mg/dL 85 83     Estimated Creatinine Clearance: 19.1  mL/min (A) (by C-G formula based on SCr of 3.1 mg/dL (H)).  Results from last 7 days   Lab Units 03/03/24  0355   MAGNESIUM mg/dL 1.7   PHOSPHORUS mg/dL 2.0*         Results from last 7 days   Lab Units 03/03/24  0355 03/02/24  1138   WBC 10*3/mm3 4.57 4.61   HEMOGLOBIN g/dL 9.8* 6.0*   PLATELETS 10*3/mm3 167 190           Assessment / Plan     ASSESSMENT:  ESRD - HD TTS at [facility].  Last dialysis THURS.  K/HCO3 normal.  Waste products acceptable.  Likely vol depleted on basis of acute pancreatitis, w/o dyspnea/edema.  Access is LIJ TDC   Recurrent acute pancreatitis, 2nd episode in 3 mos.  GI following.  Etiol unclear.  Secondary w/u underway; GIANFRANCO & IgG4 pending  Anemia of CKD (& ABL?) - robust response to transfusion 2u PRBC on dialysis, hgb up 6.0 to 9.8; no melena.  Had EGD/colonoscopy year ago, former showing gastritis.  GI considering repeat EGD   HTN - BP improved post HD (w/o UF).  On max coreg, low dose ACE/hydralazine, latter is TID.  Norvasc stopped recent admission.  SLE on IS (cellcept, plaquenil); no assoc leukopenia   Hypophosphatemia, mild (2.0) in relation to poor PO intake; no renal restrictions in diet; no need to replace yet     PLAN:  Next dialysis TUES   Aim to simplify BP regimen: inc ACEi 20 mg, dec hydralazine TID to BID  (Further titration of ACEi later may allow eventual cessation of hydralazine)      Lance Martínez MD  03/03/24  13:38 EST    [facility]  [phone]

## 2024-03-03 NOTE — PROGRESS NOTES
" LOS: 1 day     Name: Dee Herrera  Age: 33 y.o.  Sex: female  :  1990  MRN: [id number]         Primary Care Physician: Margarita Woods APRN    Subjective   Subjective  Reports improvement of her abdominal pain.  Tolerating low-fat diet.    Objective   Vital Signs  Temp:  [98.8 °F (37.1 °C)-100 °F (37.8 °C)] 99.5 °F (37.5 °C)  Heart Rate:  [] 100  Resp:  [16-18] 16  BP: (115-165)/() 128/94  Body mass index is 16.34 kg/m².    Objective:  General Appearance:  Comfortable and in no acute distress.    Vital signs: (most recent): Blood pressure 128/94, pulse 100, temperature 99.5 °F (37.5 °C), temperature source Oral, resp. rate 16, height 167.6 cm (65.98\"), weight 45.9 kg (101 lb 3.1 oz), SpO2 99%, not currently breastfeeding.    Lungs:  Normal effort and normal respiratory rate.  She is not in respiratory distress.  There are decreased breath sounds.    Heart: Normal rate.  Regular rhythm.    Abdomen: Abdomen is soft.  Bowel sounds are normal.   There is no abdominal tenderness.     Extremities: There is no dependent edema or local swelling.    Neurological: Patient is alert and oriented to person, place and time.    Skin:  Warm and dry.                Results Review:       I reviewed the patient's new clinical results.    Results from last 7 days   Lab Units 24  0355 24  1138   WBC 10*3/mm3 4.57 4.61   HEMOGLOBIN g/dL 9.8* 6.0*   PLATELETS 10*3/mm3 167 190     Results from last 7 days   Lab Units 24  0355 24  1138   SODIUM mmol/L 136 138   POTASSIUM mmol/L 3.8 3.6   CHLORIDE mmol/L 102 100   CO2 mmol/L 26.0 27.0   BUN mg/dL 4* 15   CREATININE mg/dL 3.10* 7.00*   CALCIUM mg/dL 8.0* 8.1*   GLUCOSE mg/dL 85 83                 Scheduled Meds:   aspirin, 81 mg, Oral, Daily  carvedilol, 25 mg, Oral, Q12H  Diclofenac Sodium, 4 g, Topical, TID  escitalopram, 10 mg, Oral, Daily  folic acid, 1 mg, Oral, Daily  heparin (porcine), 5,000 Units, Subcutaneous, Q8H  hydrALAZINE, 25 " mg, Oral, TID  hydroxychloroquine, 200 mg, Oral, Daily  lacosamide, 100 mg, Oral, Q12H  lisinopril, 10 mg, Oral, Q24H  mycophenolate, 250 mg, Oral, Q12H  sodium chloride, 10 mL, Intravenous, Q12H      PRN Meds:     acetaminophen **OR** acetaminophen **OR** acetaminophen    senna-docusate sodium **AND** polyethylene glycol **AND** bisacodyl **AND** bisacodyl    heparin (porcine)    hydrALAZINE    HYDROmorphone    nitroglycerin    ondansetron    oxyCODONE-acetaminophen    sodium chloride    sodium chloride  Continuous Infusions:       Assessment & Plan   Active Hospital Problems    Diagnosis  POA    **Acute pancreatitis [K85.90]  Yes    ESRD (end stage renal disease) [N18.6]  Yes    Severe aortic valve regurgitation [I35.1]  Yes    Chronic diastolic CHF (congestive heart failure) [I50.32]  Yes    Lupus nephritis, ISN/RPS class IV [M32.14]  Yes    Systemic lupus erythematosus [M32.9]  Yes    Hypertension secondary to other renal disorders [I15.1]  Yes    Pancytopenia [D61.818]  Yes      Resolved Hospital Problems   No resolved problems to display.       Assessment & Plan    -Continues on low-fat diet.  Seems to be tolerating.  GI following.  Investigating for alternative causes of acute pancreatitis  -Hemoglobin improved status post 2 units of packed red blood cells with dialysis yesterday.  Monitor closely  -Nephrology following for her end-stage renal disease  -Continue outpatient treatment of her lupus  -Blood pressure much improved today        Expected discharge date/ time has not been documented.     Bobby Carter MD  [facility]  03/03/24  09:41 EST

## 2024-03-03 NOTE — PROGRESS NOTES
Gastroenterology   Inpatient Progress Note    Reason for Follow Up: Pancreatitis, severe anemia    Subjective     Interval History:   Hemoglobin increased from 6.0-9.8  Tolerated p.o.  Minimal discomfort after eating.  Feels like she is improving.  No overt GI bleeding    Current Facility-Administered Medications:     acetaminophen (TYLENOL) tablet 650 mg, 650 mg, Oral, Q4H PRN **OR** acetaminophen (TYLENOL) 160 MG/5ML oral solution 650 mg, 650 mg, Oral, Q4H PRN **OR** acetaminophen (TYLENOL) suppository 650 mg, 650 mg, Rectal, Q4H PRN, Bobby Carter MD    aspirin chewable tablet 81 mg, 81 mg, Oral, Daily, Bobby Carter MD, 81 mg at 03/02/24 1149    sennosides-docusate (PERICOLACE) 8.6-50 MG per tablet 2 tablet, 2 tablet, Oral, BID PRN **AND** polyethylene glycol (MIRALAX) packet 17 g, 17 g, Oral, Daily PRN **AND** bisacodyl (DULCOLAX) EC tablet 5 mg, 5 mg, Oral, Daily PRN **AND** bisacodyl (DULCOLAX) suppository 10 mg, 10 mg, Rectal, Daily PRN, Bobby Carter MD    carvedilol (COREG) tablet 25 mg, 25 mg, Oral, Q12H, Bobby Carter MD, 25 mg at 03/02/24 2341    Diclofenac Sodium (VOLTAREN) 1 % gel 4 g, 4 g, Topical, TID, Bobby Carter MD, 4 g at 03/02/24 1522    escitalopram (LEXAPRO) tablet 10 mg, 10 mg, Oral, Daily, Bobby Carter MD, 10 mg at 03/02/24 1319    folic acid (FOLVITE) tablet 1 mg, 1 mg, Oral, Daily, Bobby Carter MD, 1 mg at 03/02/24 1319    heparin (porcine) 5000 UNIT/ML injection 5,000 Units, 5,000 Units, Subcutaneous, Q8H, Bobby Carter MD, 5,000 Units at 03/03/24 0644    heparin (porcine) injection 4,000 Units, 4,000 Units, Intracatheter, PRN, Bobby Carter MD, 4,000 Units at 03/02/24 2333    hydrALAZINE (APRESOLINE) injection 10 mg, 10 mg, Intravenous, Q6H PRN, Bobby Carter MD, 10 mg at 03/02/24 1150    hydrALAZINE (APRESOLINE) tablet 25 mg, 25 mg, Oral, TID, Emma,  Bobby Macias MD, 25 mg at 03/02/24 2341    HYDROmorphone (DILAUDID) injection 0.5 mg, 0.5 mg, Intravenous, Q4H PRN, Bobby Carter MD, 0.5 mg at 03/03/24 0345    hydroxychloroquine (PLAQUENIL) tablet 200 mg, 200 mg, Oral, Daily, Bobby Carter MD, 200 mg at 03/02/24 1319    lacosamide (VIMPAT) tablet 100 mg, 100 mg, Oral, Q12H, Bobby Carter MD, 100 mg at 03/02/24 2342    lisinopril (PRINIVIL,ZESTRIL) tablet 10 mg, 10 mg, Oral, Q24H, Bobby Carter MD, 10 mg at 03/02/24 1319    mycophenolate (CELLCEPT) capsule 250 mg, 250 mg, Oral, Q12H, Bobby Carter MD, 250 mg at 03/02/24 2341    nitroglycerin (NITROSTAT) SL tablet 0.4 mg, 0.4 mg, Sublingual, Q5 Min PRN, Bobby Carter MD    ondansetron (ZOFRAN) injection 4 mg, 4 mg, Intravenous, Q6H PRN, Bobby Carter MD    oxyCODONE-acetaminophen (PERCOCET) 5-325 MG per tablet 1 tablet, 1 tablet, Oral, Q4H PRN, Bobby Carter MD, 1 tablet at 03/02/24 1149    sodium chloride 0.9 % flush 10 mL, 10 mL, Intravenous, Q12H, Bobby Carter MD, 10 mL at 03/02/24 2343    sodium chloride 0.9 % flush 10 mL, 10 mL, Intravenous, PRN, Bobby Carter MD    sodium chloride 0.9 % infusion 40 mL, 40 mL, Intravenous, PRN, Bobby Carter MD  Review of Systems:               The following systems were reviewed and negative;  gastrointestinal    Objective     Vital Signs  Temp:  [98.8 °F (37.1 °C)-100 °F (37.8 °C)] 99.5 °F (37.5 °C)  Heart Rate:  [] 100  Resp:  [16-18] 16  BP: (115-165)/() 128/94  Body mass index is 16.34 kg/m².    Intake/Output Summary (Last 24 hours) at 3/3/2024 0736  Last data filed at 3/2/2024 2315  Gross per 24 hour   Intake 600 ml   Output --   Net 600 ml     No intake/output data recorded.                Physical Exam:              General: patient awake, alert and cooperative              Eyes: no scleral icterus               Skin: warm and dry, not jaundiced              Abdomen: soft, nontender, nondistended; normal bowel sounds, no masses palpated, no periumbical lymphadenopathy              Psychiatric: Appropriate affect and behavior                Results Review:                I reviewed the patient's new clinical results.    Results from last 7 days   Lab Units 03/03/24  0355 03/02/24  1138   WBC 10*3/mm3 4.57 4.61   HEMOGLOBIN g/dL 9.8* 6.0*   HEMATOCRIT % 32.0* 21.4*   PLATELETS 10*3/mm3 167 190     Results from last 7 days   Lab Units 03/03/24  0355 03/02/24  1138   SODIUM mmol/L 136 138   POTASSIUM mmol/L 3.8 3.6   CHLORIDE mmol/L 102 100   CO2 mmol/L 26.0 27.0   BUN mg/dL 4* 15   CREATININE mg/dL 3.10* 7.00*   CALCIUM mg/dL 8.0* 8.1*   BILIRUBIN mg/dL  --  0.5   ALK PHOS U/L  --  54   ALT (SGPT) U/L  --  9   AST (SGOT) U/L  --  21   GLUCOSE mg/dL 85 83         Lab Results   Lab Value Date/Time    LIPASE 161 (H) 03/02/2024 1138    LIPASE 141 (H) 03/01/2024 1137    LIPASE 389 (H) 02/07/2024 1259    LIPASE 416 (H) 01/29/2024 0413    LIPASE 194 (H) 01/28/2024 0408    LIPASE 189 (H) 01/27/2024 0018    LIPASE 295 (H) 01/26/2024 1654    LIPASE 42 10/26/2023 0054    LIPASE 10 (L) 09/10/2023 1901    LIPASE 22 02/15/2023 0343    LIPASE 22 02/14/2023 2355    LIPASE 47 11/25/2022 2109    LIPASE 27 11/22/2022 1059    LIPASE 32 11/21/2022 2030       Radiology:  No orders to display       Assessment & Plan     Active Hospital Problems    Diagnosis     **Acute pancreatitis     ESRD (end stage renal disease)     Severe aortic valve regurgitation     Chronic diastolic CHF (congestive heart failure)     Lupus nephritis, ISN/RPS class IV     Systemic lupus erythematosus     Hypertension secondary to other renal disorders     Pancytopenia        Assessment:  Pancreatitis.  Patient continues to improve symptomatically there are some new fluid collections as noted on consultation.  There is some pancreatic lesions along the greater curve of the  stomach between the lesser curve of the stomach and the pancreatic tail.  Previous workup for triglycerides and right upper quadrant ultrasound was negative  History of, none current, alcohol use  Chronic anemia improved after transfusion, no overt GI bleeding  Gastritis on CT scan      Plan:  Follow-up remaining of labs including IgG4 and GIANFRANCO regarding recurrent pancreatitis  Trend hemoglobin  Continue PPI  Watch for overt signs of bleeding  Will need follow-up with Dr. Kaminski after discharge for monitoring of these fluid collections of the pancreas and consideration of timing of endoscopic evaluation  I discussed the patients findings and my recommendations with patient.             Vince Pittman M.D.  [facility] [place] [id number]  Office: [phone]

## 2024-03-04 ENCOUNTER — READMISSION MANAGEMENT (OUTPATIENT)
Dept: CALL CENTER | Facility: HOSPITAL | Age: 34
End: 2024-03-04
Payer: MEDICARE

## 2024-03-04 VITALS
WEIGHT: 101.19 LBS | DIASTOLIC BLOOD PRESSURE: 89 MMHG | OXYGEN SATURATION: 100 % | HEIGHT: 66 IN | BODY MASS INDEX: 16.26 KG/M2 | HEART RATE: 79 BPM | SYSTOLIC BLOOD PRESSURE: 127 MMHG | RESPIRATION RATE: 18 BRPM | TEMPERATURE: 99 F

## 2024-03-04 LAB
ALBUMIN SERPL-MCNC: 2.2 G/DL (ref 3.5–5.2)
ANION GAP SERPL CALCULATED.3IONS-SCNC: 9 MMOL/L (ref 5–15)
BUN SERPL-MCNC: 13 MG/DL (ref 6–20)
BUN/CREAT SERPL: 2.5 (ref 7–25)
CALCIUM SPEC-SCNC: 8.1 MG/DL (ref 8.6–10.5)
CHLORIDE SERPL-SCNC: 104 MMOL/L (ref 98–107)
CO2 SERPL-SCNC: 25 MMOL/L (ref 22–29)
CREAT SERPL-MCNC: 5.16 MG/DL (ref 0.57–1)
DEPRECATED RDW RBC AUTO: 51.1 FL (ref 37–54)
EGFRCR SERPLBLD CKD-EPI 2021: 10.8 ML/MIN/1.73
ERYTHROCYTE [DISTWIDTH] IN BLOOD BY AUTOMATED COUNT: 18.1 % (ref 12.3–15.4)
GLUCOSE SERPL-MCNC: 87 MG/DL (ref 65–99)
HCT VFR BLD AUTO: 27.1 % (ref 34–46.6)
HGB BLD-MCNC: 8.2 G/DL (ref 12–15.9)
MCH RBC QN AUTO: 24.6 PG (ref 26.6–33)
MCHC RBC AUTO-ENTMCNC: 30.3 G/DL (ref 31.5–35.7)
MCV RBC AUTO: 81.1 FL (ref 79–97)
PHOSPHATE SERPL-MCNC: 3.8 MG/DL (ref 2.5–4.5)
PLATELET # BLD AUTO: 188 10*3/MM3 (ref 140–450)
POTASSIUM SERPL-SCNC: 4.1 MMOL/L (ref 3.5–5.2)
RBC # BLD AUTO: 3.34 10*6/MM3 (ref 3.77–5.28)
SODIUM SERPL-SCNC: 138 MMOL/L (ref 136–145)
WBC NRBC COR # BLD AUTO: 4.57 10*3/MM3 (ref 3.4–10.8)

## 2024-03-04 PROCEDURE — 99232 SBSQ HOSP IP/OBS MODERATE 35: CPT

## 2024-03-04 PROCEDURE — 85027 COMPLETE CBC AUTOMATED: CPT | Performed by: INTERNAL MEDICINE

## 2024-03-04 PROCEDURE — 63710000001 MYCOPHENOLATE MOFETIL PER 250 MG: Performed by: INTERNAL MEDICINE

## 2024-03-04 PROCEDURE — 80069 RENAL FUNCTION PANEL: CPT | Performed by: INTERNAL MEDICINE

## 2024-03-04 PROCEDURE — 25010000002 HEPARIN (PORCINE) PER 1000 UNITS: Performed by: INTERNAL MEDICINE

## 2024-03-04 RX ORDER — LISINOPRIL 20 MG/1
20 TABLET ORAL NIGHTLY
Qty: 30 TABLET | Refills: 0 | Status: SHIPPED | OUTPATIENT
Start: 2024-03-04 | End: 2024-04-03

## 2024-03-04 RX ORDER — HYDRALAZINE HYDROCHLORIDE 25 MG/1
25 TABLET, FILM COATED ORAL EVERY 12 HOURS SCHEDULED
Qty: 60 TABLET | Refills: 0 | Status: SHIPPED | OUTPATIENT
Start: 2024-03-04 | End: 2024-04-03

## 2024-03-04 RX ORDER — OXYCODONE HYDROCHLORIDE AND ACETAMINOPHEN 5; 325 MG/1; MG/1
1 TABLET ORAL EVERY 4 HOURS PRN
Qty: 12 TABLET | Refills: 0 | Status: SHIPPED | OUTPATIENT
Start: 2024-03-04

## 2024-03-04 RX ADMIN — CARVEDILOL 25 MG: 25 TABLET, FILM COATED ORAL at 08:26

## 2024-03-04 RX ADMIN — ESCITALOPRAM OXALATE 10 MG: 10 TABLET, FILM COATED ORAL at 08:26

## 2024-03-04 RX ADMIN — LACOSAMIDE 100 MG: 100 TABLET, FILM COATED ORAL at 08:26

## 2024-03-04 RX ADMIN — HEPARIN SODIUM 5000 UNITS: 5000 INJECTION INTRAVENOUS; SUBCUTANEOUS at 06:46

## 2024-03-04 RX ADMIN — FOLIC ACID 1 MG: 1 TABLET ORAL at 08:26

## 2024-03-04 RX ADMIN — HYDROXYCHLOROQUINE SULFATE 200 MG: 200 TABLET, FILM COATED ORAL at 08:26

## 2024-03-04 RX ADMIN — HYDRALAZINE HYDROCHLORIDE 25 MG: 25 TABLET ORAL at 08:26

## 2024-03-04 RX ADMIN — OXYCODONE HYDROCHLORIDE AND ACETAMINOPHEN 1 TABLET: 5; 325 TABLET ORAL at 04:21

## 2024-03-04 RX ADMIN — ASPIRIN 81 MG: 81 TABLET, CHEWABLE ORAL at 08:26

## 2024-03-04 RX ADMIN — MYCOPHENOLATE MOFETIL 250 MG: 250 CAPSULE ORAL at 08:25

## 2024-03-04 NOTE — OUTREACH NOTE
Prep Survey      Flowsheet Row Responses   [facility] patient discharged from? [facility]   Is LACE score < 7 ? No   Eligibility [facility]   Date of Admission 03/02/24   Date of Discharge 03/04/24   Discharge Disposition Home or Self Care   Discharge diagnosis Acute pancreatitis   Does the patient have one of the following disease processes/diagnoses(primary or secondary)? Other   Prep survey completed? Yes            Roberta BARTHOLOMEW - Registered Nurse

## 2024-03-04 NOTE — PROGRESS NOTES
[facility] Progress Note      Patient Name: Dee Herrera  : 1990  MRN: [id number]  Primary Care Physician:  Margarita Woods, CAESAR  Date of admission: 3/2/2024    Subjective     Interval History:   F/u ESRD  The patient is feeling much better except complaining of left-sided neck and upper back pain which is chronic, no chest pain, no shortness of air, no orthopnea or PND, had dialysis on Saturday without any difficulty  Review of Systems:   As noted above    Objective     Vitals:   Temp:  [98.4 °F (36.9 °C)-99 °F (37.2 °C)] 99 °F (37.2 °C)  Heart Rate:  [79-89] 79  Resp:  [16-18] 18  BP: (114-130)/(84-96) 127/89    Intake/Output Summary (Last 24 hours) at 3/4/2024 1044  Last data filed at 3/4/2024 0700  Gross per 24 hour   Intake 240 ml   Output --   Net 240 ml       Physical Exam:    General Appearance: Awake, alert, chronically ill, no acute distress  Skin: Warm and dry  Neck: LIJ TDC, no JVD  Lungs: Lear to auscultation, unlabored breathing effort  Heart: RRR, normal S1 and S2, no S3, no rub  Abdomen: soft, nontender, nondistended  Extremities: no edema, cyanosis or clubbing      Scheduled Meds:     aspirin, 81 mg, Oral, Daily  carvedilol, 25 mg, Oral, Q12H  Diclofenac Sodium, 4 g, Topical, TID  escitalopram, 10 mg, Oral, Daily  folic acid, 1 mg, Oral, Daily  hydrALAZINE, 25 mg, Oral, Q12H  hydroxychloroquine, 200 mg, Oral, Daily  lacosamide, 100 mg, Oral, Q12H  lisinopril, 20 mg, Oral, Nightly  mycophenolate, 250 mg, Oral, Q12H  sodium chloride, 10 mL, Intravenous, Q12H      IV Meds:        Results Reviewed:   I have personally reviewed the results from the time of this admission to 3/4/2024 10:44 EST     Results from last 7 days   Lab Units 24  0422 24  0355 24  1138   SODIUM mmol/L 138 136 138   POTASSIUM mmol/L 4.1 3.8 3.6   CHLORIDE mmol/L 104 102 100   CO2 mmol/L 25.0 26.0 27.0   BUN mg/dL 13 4* 15   CREATININE mg/dL 5.16* 3.10* 7.00*   CALCIUM  mg/dL 8.1* 8.0* 8.1*   BILIRUBIN mg/dL  --   --  0.5   ALK PHOS U/L  --   --  54   ALT (SGPT) U/L  --   --  9   AST (SGOT) U/L  --   --  21   GLUCOSE mg/dL 87 85 83     Estimated Creatinine Clearance: 11.4 mL/min (A) (by C-G formula based on SCr of 5.16 mg/dL (H)).  Results from last 7 days   Lab Units 03/04/24  0422 03/03/24  0355   MAGNESIUM mg/dL  --  1.7   PHOSPHORUS mg/dL 3.8 2.0*         Results from last 7 days   Lab Units 03/04/24  0422 03/03/24  0355 03/02/24  1138   WBC 10*3/mm3 4.57 4.57 4.61   HEMOGLOBIN g/dL 8.2* 9.8* 6.0*   PLATELETS 10*3/mm3 188 167 190           Assessment / Plan     ASSESSMENT:  ESRD - HD TTS at [facility].  Last dialysis was on Saturday was uneventful plan for dialysis tomorrow after discharge as an outpatient.    Recurrent acute pancreatitis, 2nd episode in 3 mos.  GI following.  Etiol unclear.  Secondary w/u underway; GIANFRANCO & IgG4 pending  Anemia of CKD (& ABL?) - robust response to transfusion 2u PRBC on dialysis, hemoglobin today is 8.2; no melena.  Had EGD/colonoscopy year ago, former showing gastritis.  GI considering repeat EGD   Hypertension with chronic kidney disease, reasonably controlled   SLE on IS (cellcept, plaquenil); no assoc leukopenia   Hypophosphatemia, resolved, phosphorus 3.8  PLAN:  The patient is cleared for discharge today  Next dialysis is tomorrow at dialysis clinic at [facility]    Reviewed the chart and other providers notes, reviewed labs.  I discussed the case with the patient and she was with understanding.      Isaac Diaz MD  03/04/24  10:44 [facility]    [facility]  [phone]

## 2024-03-04 NOTE — PROGRESS NOTES
Gastroenterology   Inpatient Progress Note    Reason for Follow Up: Pancreatitis, severe anemia    Subjective  Interval History:   Patient tolerating oral intake however reports a recurrence and nausea.  Patient noted to have peanut covered M&Ms which she confirms she had been consuming overnight.  Informed patient this is likely contributing to return and nausea secondary to high fat content of food and stressed importance of adherence to low-fat diet.        Current Facility-Administered Medications:     acetaminophen (TYLENOL) tablet 650 mg, 650 mg, Oral, Q4H PRN **OR** acetaminophen (TYLENOL) 160 MG/5ML oral solution 650 mg, 650 mg, Oral, Q4H PRN **OR** acetaminophen (TYLENOL) suppository 650 mg, 650 mg, Rectal, Q4H PRN, Bboby Carter MD    aspirin chewable tablet 81 mg, 81 mg, Oral, Daily, Bobby Carter MD, 81 mg at 03/04/24 0826    sennosides-docusate (PERICOLACE) 8.6-50 MG per tablet 2 tablet, 2 tablet, Oral, BID PRN **AND** polyethylene glycol (MIRALAX) packet 17 g, 17 g, Oral, Daily PRN **AND** bisacodyl (DULCOLAX) EC tablet 5 mg, 5 mg, Oral, Daily PRN **AND** bisacodyl (DULCOLAX) suppository 10 mg, 10 mg, Rectal, Daily PRN, Bobby Carter MD    carvedilol (COREG) tablet 25 mg, 25 mg, Oral, Q12H, Bobby Carter MD, 25 mg at 03/04/24 0826    Diclofenac Sodium (VOLTAREN) 1 % gel 4 g, 4 g, Topical, TID, Bobby Carter MD, 4 g at 03/03/24 2048    escitalopram (LEXAPRO) tablet 10 mg, 10 mg, Oral, Daily, Bobby Carter MD, 10 mg at 03/04/24 0826    folic acid (FOLVITE) tablet 1 mg, 1 mg, Oral, Daily, Bobby Carter MD, 1 mg at 03/04/24 0826    heparin (porcine) injection 4,000 Units, 4,000 Units, Intracatheter, PRN, Bobby Carter MD, 4,000 Units at 03/02/24 2333    hydrALAZINE (APRESOLINE) injection 10 mg, 10 mg, Intravenous, Q6H PRN, Bobby Carter MD, 10 mg at 03/02/24 1150    hydrALAZINE (APRESOLINE)  tablet 25 mg, 25 mg, Oral, Q12H, Lance Martínez MD, 25 mg at 03/04/24 0826    HYDROmorphone (DILAUDID) injection 0.5 mg, 0.5 mg, Intravenous, Q4H PRN, Bobby Carter MD, 0.5 mg at 03/03/24 0345    hydroxychloroquine (PLAQUENIL) tablet 200 mg, 200 mg, Oral, Daily, Bobby Carter MD, 200 mg at 03/04/24 0826    lacosamide (VIMPAT) tablet 100 mg, 100 mg, Oral, Q12H, Bobby Carter MD, 100 mg at 03/04/24 0826    lisinopril (PRINIVIL,ZESTRIL) tablet 20 mg, 20 mg, Oral, Nightly, Lance Martínez MD    mycophenolate (CELLCEPT) capsule 250 mg, 250 mg, Oral, Q12H, Bobby Carter MD, 250 mg at 03/04/24 0825    nitroglycerin (NITROSTAT) SL tablet 0.4 mg, 0.4 mg, Sublingual, Q5 Min PRN, Bobby Carter MD    ondansetron (ZOFRAN) injection 4 mg, 4 mg, Intravenous, Q6H PRN, Bobby Carter MD    oxyCODONE-acetaminophen (PERCOCET) 5-325 MG per tablet 1 tablet, 1 tablet, Oral, Q4H PRN, Bobby Carter MD, 1 tablet at 03/04/24 0421    sodium chloride 0.9 % flush 10 mL, 10 mL, Intravenous, Q12H, Bobby Carter MD, 10 mL at 03/03/24 2048    sodium chloride 0.9 % flush 10 mL, 10 mL, Intravenous, PRN, Bobby Carter MD    sodium chloride 0.9 % infusion 40 mL, 40 mL, Intravenous, PRN, Bobby Carter MD    Current Outpatient Medications:     carvedilol (COREG) 25 MG tablet, Take 1 tablet by mouth Every 12 (Twelve) Hours., Disp: 60 tablet, Rfl: 0    Diclofenac Sodium (VOLTAREN) 1 % gel gel, Apply 4 g topically to the appropriate area as directed 3 (Three) Times a Day., Disp: 150 g, Rfl: 0    escitalopram (LEXAPRO) 10 MG tablet, Take 1 tablet by mouth Daily., Disp: , Rfl:     folic acid (FOLVITE) 1 MG tablet, Take 1 tablet by mouth Daily., Disp: 90 tablet, Rfl: 1    hydrALAZINE (APRESOLINE) 25 MG tablet, Take 1 tablet by mouth Every 12 (Twelve) Hours., Disp: 60 tablet, Rfl: 0    lacosamide (VIMPAT) 100 MG  tablet tablet, Take 1 tablet by mouth Every 12 (Twelve) Hours., Disp: 6 tablet, Rfl: 0    lisinopril (PRINIVIL,ZESTRIL) 20 MG tablet, Take 1 tablet by mouth Every Night., Disp: 30 tablet, Rfl: 0    mycophenolate (CELLCEPT) 500 MG tablet, Take 0.5 tablets by mouth Every 12 (Twelve) Hours., Disp: 30 tablet, Rfl: 0    ondansetron (Zofran) 4 MG tablet, Take 1 tablet by mouth Every 8 (Eight) Hours As Needed for Nausea or Vomiting., Disp: 30 tablet, Rfl: 1    ondansetron ODT (ZOFRAN-ODT) 4 MG disintegrating tablet, Place 1 tablet on the tongue Every 8 (Eight) Hours As Needed for Vomiting or Nausea., Disp: 30 tablet, Rfl: 0    oxyCODONE-acetaminophen (PERCOCET) 5-325 MG per tablet, Take 1 tablet by mouth Every 4 (Four) Hours As Needed for Moderate Pain., Disp: 12 tablet, Rfl: 0    polyethylene glycol (MIRALAX) 17 GM/SCOOP powder, Take 17 g by mouth As Needed., Disp: , Rfl:     aspirin 81 MG chewable tablet, Chew 1 tablet Daily., Disp: , Rfl:     hydroxychloroquine (PLAQUENIL) 200 MG tablet, Take 1 tablet by mouth Daily., Disp: , Rfl:   Review of Systems:               All systems were reviewed and negative except for:  Gastrointestinal: positive for  nausea    Objective     Vital Signs  Temp:  [98.4 °F (36.9 °C)-99 °F (37.2 °C)] 99 °F (37.2 °C)  Heart Rate:  [79-89] 79  Resp:  [16-18] 18  BP: (114-130)/(84-96) 127/89  Body mass index is 16.34 kg/m².                  General Appearance:  awake, alert, oriented, in no acute distress  Abdomen:  Soft, non-tender, normal bowel sounds; no bruits, organomegaly or masses.                Results Review:                I reviewed the patient's new clinical results.    Results from last 7 days   Lab Units 03/04/24  0422 03/03/24  0355 03/02/24  1138   WBC 10*3/mm3 4.57 4.57 4.61   HEMOGLOBIN g/dL 8.2* 9.8* 6.0*   HEMATOCRIT % 27.1* 32.0* 21.4*   PLATELETS 10*3/mm3 188 167 190     Results from last 7 days   Lab Units 03/04/24  0422 03/03/24  0355 03/02/24  1138   SODIUM mmol/L 138 136  138   POTASSIUM mmol/L 4.1 3.8 3.6   CHLORIDE mmol/L 104 102 100   CO2 mmol/L 25.0 26.0 27.0   BUN mg/dL 13 4* 15   CREATININE mg/dL 5.16* 3.10* 7.00*   CALCIUM mg/dL 8.1* 8.0* 8.1*   BILIRUBIN mg/dL  --   --  0.5   ALK PHOS U/L  --   --  54   ALT (SGPT) U/L  --   --  9   AST (SGOT) U/L  --   --  21   GLUCOSE mg/dL 87 85 83         Lab Results   Lab Value Date/Time    LIPASE 161 (H) 03/02/2024 1138    LIPASE 141 (H) 03/01/2024 1137    LIPASE 389 (H) 02/07/2024 1259    LIPASE 416 (H) 01/29/2024 0413    LIPASE 10 (L) 09/10/2023 1901    LIPASE 47 11/25/2022 2109       Radiology:  No orders to display       Assessment & Plan     Active Hospital Problems    Diagnosis     **Acute pancreatitis     ESRD (end stage renal disease)     Severe aortic valve regurgitation     Chronic diastolic CHF (congestive heart failure)     Lupus nephritis, ISN/RPS class IV     Systemic lupus erythematosus     Hypertension secondary to other renal disorders     Pancytopenia        Assessment:  Pancreatitis.  CT with some new fluid collections as discussed on consultation.  Some pancreatic lesions along the greater curve stomach between the lesser curve and pancreatic tail.  Previous workup for triglycerides and right upper quadrant ultrasound negative  Distant history of alcohol use  Chronic anemia  Gastritis on CT scan    These problems are new to me      Plan:  Nursing staff to notify GI service on call if any change in clinical status.  IgG4 and GIANFRANCO pending for further assessment of recurrent pancreatitis-can be followed up outpatient  Trend hemoglobin  Continue PPI  Monitor for overt signs of GI bleeding  Recommend keeping schedule 3/28 outpatient follow-up with Roseline Jackson PA-C as planned after discharge for continued monitoring of fluid collections of pancreas as well as to discuss timing of future endoscopic evaluation if not completed during current inpatient admission.    Patient is tolerating oral intake without subsequent  episodes of vomiting and labs are relatively stable to patient's baseline without evidence of overt GI bleeding.  Reiterated importance of compliance with low-fat diet.  Written instructions provided to patient on low-fat foods.  She is aware of alarm symptoms that would warrant a return to ER for further evaluation.  No further GI recommendations.      GI will sign off for now.  As always, thank you for allowing us to participate in the care of your patient.  If we can be of further assistance please not hesitate to reach out to us.      I discussed the patients findings and my recommendations with patient, nursing staff, and primary care team.          CAESAR Huff  [facility]  [address] [place] [address]

## 2024-03-04 NOTE — DISCHARGE SUMMARY
Date of Admission: 3/2/2024  Date of Discharge:  3/4/2024  Primary Care Physician: Margarita Woods, APRN     Discharge Diagnosis:  Active Hospital Problems    Diagnosis  POA    **Acute pancreatitis [K85.90]  Yes    ESRD (end stage renal disease) [N18.6]  Yes    Severe aortic valve regurgitation [I35.1]  Yes    Chronic diastolic CHF (congestive heart failure) [I50.32]  Yes    Lupus nephritis, ISN/RPS class IV [M32.14]  Yes    Systemic lupus erythematosus [M32.9]  Yes    Hypertension secondary to other renal disorders [I15.1]  Yes    Pancytopenia [D61.818]  Yes      Resolved Hospital Problems   No resolved problems to display.       DETAILS OF HOSPITAL STAY     Pertinent Test Results and Procedures Performed    CT scan of the abdomen and pelvis:  1.  Mild inflammatory stranding adjacent to the pancreas could represent interstitial edematous pancreatitis.2.  When compared to the prior exam on 01/23/2024, small to moderate volume ascites has worsened from prior with suspected early/developing loculation of the ascites. Additionally, there are new peripancreatic fluid collections along the greater curvature of the stomach and between the lesser curvature of the stomach and the pancreatic tail as above. Other peripancreatic fluid collection has decreased in size from prior exam.3.  Mild peritoneal thickening may be related to be the loculating fluid or peritonitis.4.  Marked gastric wall thickening suggestive of gastritis.5.  Cardiomegaly and small left pleural effusion.     Hospital Course  This is a 33-year-old female with history of lupus, end-stage renal disease, prior episodes of pancreatitis of unclear etiology who presented to an outside emergency room with complaints of abdominal pain nausea and vomiting.  Please see H&P for full details of admission.  She was found to have acute pancreatitis at the outside ER.  Due to bed availability she had to wait there for quite some time prior to transfer and by the time  she got here she was already starting to feel better.  Gastroenterology was consulted along with nephrology.  Repeat labs here showed anemia requiring 2 units of packed red blood cells which were given with dialysis.  She had no signs of bleeding.  Her diet was advanced and pain remained controlled.  Gastroenterology has cleared her for discharge.  Her next session of dialysis will be tomorrow as per her routine schedule.  Nephrology has made adjustments to her antihypertensive regimen in hopes of simplifying this moving forward.  She is medically stable at this time and will be released.  Discussed with patient and GI at the bedside who are in agreement with this plan.  She will need to follow-up with Dr. Kaminski of gastroenterology for continued monitoring of the peripancreatic fluid collection seen on CT as described above.    Physical Exam at Discharge:  General: No acute distress, AAOx3, chronically ill-appearing  HEENT: EOMI, PERRL  Cardiovascular: +s1 and s2, RRR  Lungs: No rhonchi or wheezing  Abdomen: soft, nontender    Consults:   Consults       Date and Time Order Name Status Description    3/2/2024 10:53 AM Inpatient Nephrology Consult Completed     3/2/2024 10:53 AM Inpatient Gastroenterology Consult                Condition on Discharge: Stable, improved    Discharge Disposition  Home or Self Care    Discharge Medications     Discharge Medications        Changes to Medications        Instructions Start Date   hydrALAZINE 25 MG tablet  Commonly known as: APRESOLINE  What changed: when to take this   25 mg, Oral, Every 12 Hours Scheduled      lisinopril 20 MG tablet  Commonly known as: PRINIVIL,ZESTRIL  What changed:   medication strength  how much to take  when to take this   20 mg, Oral, Nightly             Continue These Medications        Instructions Start Date   aspirin 81 MG chewable tablet   81 mg, Oral, Daily      carvedilol 25 MG tablet  Commonly known as: COREG   25 mg, Oral, Every 12 Hours  Scheduled      Diclofenac Sodium 1 % gel gel  Commonly known as: VOLTAREN   4 g, Topical, 3 Times Daily      escitalopram 10 MG tablet  Commonly known as: LEXAPRO   10 mg, Oral, Daily      folic acid 1 MG tablet  Commonly known as: FOLVITE   1 mg, Oral, Daily      hydroxychloroquine 200 MG tablet  Commonly known as: PLAQUENIL   200 mg, Oral, Daily      lacosamide 100 MG tablet tablet  Commonly known as: VIMPAT   100 mg, Oral, Every 12 Hours Scheduled      mycophenolate 500 MG tablet  Commonly known as: CELLCEPT   250 mg, Oral, Every 12 Hours Scheduled      ondansetron 4 MG tablet  Commonly known as: Zofran   4 mg, Oral, Every 8 Hours PRN      ondansetron ODT 4 MG disintegrating tablet  Commonly known as: ZOFRAN-ODT   4 mg, Translingual, Every 8 Hours PRN      oxyCODONE-acetaminophen 5-325 MG per tablet  Commonly known as: PERCOCET   1 tablet, Oral, Every 4 Hours PRN      polyethylene glycol 17 GM/SCOOP powder  Commonly known as: MIRALAX   17 g, Oral, As Needed               Discharge Diet:   Diet Instructions       Diet: Regular/House Diet, Gastrointestinal Diets; Fat-Restricted; Regular (IDDSI 7); Thin (IDDSI 0)      Discharge Diet:  Regular/House Diet  Gastrointestinal Diets       Gastrointestinal Diet: Fat-Restricted    Texture: Regular (IDDSI 7)    Fluid Consistency: Thin (IDDSI 0)            Activity at Discharge:   Activity Instructions       Activity as Tolerated              Follow-up Appointments  Future Appointments   Date Time Provider Department Center   3/28/2024  2:15 PM Roseline Jackson PA-C [facility]   4/8/2024  8:00 AM Margarita Woods APRN [facility]   4/9/2024 11:00 AM Chris Medina MD [facility]     Additional Instructions for the Follow-ups that You Need to Schedule       Discharge Follow-up with PCP   As directed       Currently Documented PCP:    Margarita Woods APRN    PCP Phone Number:    [phone]     Follow Up Details: 1 week                Test Results  Pending at Discharge  Pending Labs       Order Current Status    GIANFRANCO Comprehensive Panel In process    IgG 4 In process            I have examined and discussed discharge planning with the patient today.    I wore full protective equipment throughout the patient encounter including eye protection and facemask.  Hand hygiene was performed before donning protective equipment and after removal when leaving the room.     Bobby Carter MD  03/04/24  09:13 EST    Time: Discharge greater than 30 min

## 2024-03-04 NOTE — CASE MANAGEMENT/SOCIAL WORK
Case Management Discharge Note      Final Note: Home, no additional CCP needs.         Selected Continued Care - Admitted Since 3/2/2024       Destination    No services have been selected for the patient.                Durable Medical Equipment    No services have been selected for the patient.                Dialysis/Infusion    No services have been selected for the patient.                Home Medical Care    No services have been selected for the patient.                Therapy    No services have been selected for the patient.                Community Resources    No services have been selected for the patient.                Community & DME    No services have been selected for the patient.                    Selected Continued Care - Prior Encounters Includes continued care and service providers with selected services from prior encounters from 12/3/2023 to 3/4/2024      Discharged on 12/9/2023 Admission date: 10/26/2023 - Discharge disposition: Skilled Nursing Facility (DC - External)      Destination       Service Provider Selected Services Address Phone Fax Patient Preferred    [facility] [address] [facility] [address] [phone] --                               Final Discharge Disposition Code: 01 - home or self-care

## 2024-03-04 NOTE — PLAN OF CARE
Problem: Adult Inpatient Plan of Care  Goal: Plan of Care Review  Outcome: Ongoing, Not Progressing  Flowsheets (Taken 3/4/2024 0646)  Progress: no change  Plan of Care Reviewed With: patient  Outcome Evaluation: No change overnight. Rested comfortably. Pain meds per MAR. Plan of care updated.  Goal: Patient-Specific Goal (Individualized)  Outcome: Ongoing, Not Progressing  Goal: Absence of Hospital-Acquired Illness or Injury  Outcome: Ongoing, Not Progressing  Intervention: Identify and Manage Fall Risk  Recent Flowsheet Documentation  Taken 3/4/2024 0600 by Celeste Johnston RN  Safety Promotion/Fall Prevention:   nonskid shoes/slippers when out of bed   safety round/check completed  Taken 3/4/2024 0400 by Celeste Johnston RN  Safety Promotion/Fall Prevention:   nonskid shoes/slippers when out of bed   safety round/check completed  Taken 3/4/2024 0215 by Celeste Johnston RN  Safety Promotion/Fall Prevention:   nonskid shoes/slippers when out of bed   safety round/check completed  Taken 3/4/2024 0030 by Celeste Johnston RN  Safety Promotion/Fall Prevention:   safety round/check completed   activity supervised   assistive device/personal items within reach   fall prevention program maintained   nonskid shoes/slippers when out of bed  Taken 3/3/2024 2226 by Celeste Johnston RN  Safety Promotion/Fall Prevention:   safety round/check completed   nonskid shoes/slippers when out of bed  Taken 3/3/2024 2045 by Celeste Johnston RN  Safety Promotion/Fall Prevention:   safety round/check completed   activity supervised   assistive device/personal items within reach   fall prevention program maintained   nonskid shoes/slippers when out of bed  Intervention: Prevent and Manage VTE (Venous Thromboembolism) Risk  Recent Flowsheet Documentation  Taken 3/3/2024 2226 by Celeste Johnston, RN  Activity Management: up ad italo  Taken 3/3/2024 2045 by Celeste Johnston RN  Activity Management: up ad italo  Intervention: Prevent  Infection  Recent Flowsheet Documentation  Taken 3/4/2024 0030 by Celeste Johnston RN  Infection Prevention:   rest/sleep promoted   personal protective equipment utilized  Taken 3/3/2024 2226 by Celeste Johnston RN  Infection Prevention: rest/sleep promoted  Taken 3/3/2024 2045 by Celeste Johnston RN  Infection Prevention:   rest/sleep promoted   personal protective equipment utilized  Goal: Optimal Comfort and Wellbeing  Outcome: Ongoing, Not Progressing  Intervention: Monitor Pain and Promote Comfort  Recent Flowsheet Documentation  Taken 3/3/2024 2045 by Celeste Johnston RN  Pain Management Interventions:   see MAR   pain management plan reviewed with patient/caregiver   relaxation techniques promoted  Intervention: Provide Person-Centered Care  Recent Flowsheet Documentation  Taken 3/4/2024 0030 by Celeste Johnston RN  Trust Relationship/Rapport:   care explained   questions answered   reassurance provided   thoughts/feelings acknowledged  Taken 3/3/2024 2045 by Celeste Johnston RN  Trust Relationship/Rapport:   care explained   choices provided   questions answered   reassurance provided   thoughts/feelings acknowledged  Goal: Readiness for Transition of Care  Outcome: Ongoing, Not Progressing   Goal Outcome Evaluation:  Plan of Care Reviewed With: patient        Progress: no change  Outcome Evaluation: No change overnight. Rested comfortably. Pain meds per MAR. Plan of care updated.

## 2024-03-05 ENCOUNTER — TRANSITIONAL CARE MANAGEMENT TELEPHONE ENCOUNTER (OUTPATIENT)
Dept: CALL CENTER | Facility: HOSPITAL | Age: 34
End: 2024-03-05
Payer: MEDICARE

## 2024-03-05 LAB
CENTROMERE B AB SER-ACNC: <0.2 AI (ref 0–0.9)
CHROMATIN AB SERPL-ACNC: >8 AI (ref 0–0.9)
DSDNA AB SER-ACNC: 43 IU/ML (ref 0–9)
ENA JO1 AB SER-ACNC: <0.2 AI (ref 0–0.9)
ENA RNP AB SER-ACNC: >8 AI (ref 0–0.9)
ENA SCL70 AB SER-ACNC: <0.2 AI (ref 0–0.9)
ENA SM AB SER-ACNC: >8 AI (ref 0–0.9)
ENA SS-A AB SER-ACNC: 5.5 AI (ref 0–0.9)
ENA SS-B AB SER-ACNC: <0.2 AI (ref 0–0.9)
IGG4 SER-MCNC: 27 MG/DL (ref 2–96)
Lab: ABNORMAL

## 2024-03-05 NOTE — OUTREACH NOTE
Call Center TCM Note      Flowsheet Row Responses   [facility] patient discharged from? [facility]   Does the patient have one of the following disease processes/diagnoses(primary or secondary)? Other   TCM attempt successful? Yes   Call start time 1018   Call end time 1021   Discharge diagnosis Acute pancreatitis   Meds reviewed with patient/caregiver? Yes   Is the patient having any side effects they believe may be caused by any medication additions or changes? No   Does the patient have all medications ordered at discharge? Yes   Is the patient taking all medications as directed (includes completed medication regime)? Yes   Comments TCM APPT WITH PCP CAESAR ABRAHAM IS 03/15/2024   Does the patient have an appointment with their PCP within 7-14 days of discharge? Yes   Has home health visited the patient within 72 hours of discharge? N/A   Psychosocial issues? No   Did the patient receive a copy of their discharge instructions? Yes   Nursing interventions Reviewed instructions with patient   What is the patient's perception of their health status since discharge? Improving   Is the patient/caregiver able to teach back signs and symptoms related to disease process for when to call PCP? Yes   Is the patient/caregiver able to teach back signs and symptoms related to disease process for when to call 911? Yes   Is the patient/caregiver able to teach back the hierarchy of who to call/visit for symptoms/problems? PCP, Specialist, Home health nurse, Urgent Care, ED, 911 Yes   TCM call completed? Yes   Wrap up additional comments D/C DX: acute Pancreatitis, acute anemia - Pt states she still has some pain but it is much better. New rx Oxycodone in place. No questions at this time. Pt is currently sched with GI MD 03/28/2024, appt may be moved sooner. TCM APPT with PCP CAESAR Abraham is 03/15/2024.   Call end time 1021            Jaja Esparza MA    3/5/2024, 10:24 EST

## 2024-03-14 ENCOUNTER — READMISSION MANAGEMENT (OUTPATIENT)
Dept: CALL CENTER | Facility: HOSPITAL | Age: 34
End: 2024-03-14
Payer: MEDICARE

## 2024-03-14 NOTE — OUTREACH NOTE
Prep Survey      Flowsheet Row Responses   [facility] facility patient discharged from? Non-BH   Is LACE score < 7 ? Non-BH Discharge   Eligibility Not Eligible   What are the reasons patient is not eligible? Readmitted  [[facility]]   Does the patient have one of the following disease processes/diagnoses(primary or secondary)? Other   Prep survey completed? Yes            Lyndsay SUTTON - Registered Nurse

## 2024-03-14 NOTE — OUTREACH NOTE
Medical Week 2 Survey      Flowsheet Row Responses   [facility] patient discharged from? [place]   Does the patient have one of the following disease processes/diagnoses(primary or secondary)? Other   Week 2 attempt successful? Yes   Call start time 1153   Discharge diagnosis Acute pancreatitis   Revoke Readmitted  [Pt is currently admitted at [facility]]   Call end time 1154   Meds reviewed with patient/caregiver? Yes   Is the patient taking all medications as directed (includes completed medication regime)? Yes   Call end time 1154            Lelia SALAZAR - Registered Nurse

## 2024-03-15 ENCOUNTER — TELEPHONE (OUTPATIENT)
Dept: GASTROENTEROLOGY | Facility: CLINIC | Age: 34
End: 2024-03-15
Payer: MEDICARE

## 2024-03-15 NOTE — TELEPHONE ENCOUNTER
Returned call to Mirian.  Mirian stated [facility]  They would like the pt to get an EUS.  Pt is wanting our office to do this.  I let hope know we do not do this in our office and we usually refer to Dr Ennis for this.  Hope will let the pt know.  She will also make sure the pt keeps her follow up in our office on 3/28

## 2024-03-15 NOTE — OUTREACH NOTE
Prep Survey      Flowsheet Row Responses   [facility] facility patient discharged from? Non-BH   Is LACE score < 7 ? Non-BH Discharge   Eligibility Not Eligible   What are the reasons patient is not eligible? Other  [No hospital encounter]   Does the patient have one of the following disease processes/diagnoses(primary or secondary)? Other   Prep survey completed? Yes            YRIS CHRISTIANSEN - Registered Nurse

## 2024-03-15 NOTE — TELEPHONE ENCOUNTER
Admitted w/pancreatitis at  and wants us to set her up for autoimmune pancreatitis us     Please call and advise   Mirian [phone]

## 2024-03-20 DIAGNOSIS — K85.90 ACUTE PANCREATITIS, UNSPECIFIED COMPLICATION STATUS, UNSPECIFIED PANCREATITIS TYPE: ICD-10-CM

## 2024-03-20 DIAGNOSIS — R10.9 ABDOMINAL PAIN, UNSPECIFIED ABDOMINAL LOCATION: ICD-10-CM

## 2024-03-20 RX ORDER — OXYCODONE HYDROCHLORIDE AND ACETAMINOPHEN 5; 325 MG/1; MG/1
1 TABLET ORAL EVERY 4 HOURS PRN
Qty: 12 TABLET | Refills: 0 | Status: CANCELLED | OUTPATIENT
Start: 2024-03-20

## 2024-03-25 RX ORDER — HYDROXYCHLOROQUINE SULFATE 200 MG/1
200 TABLET, FILM COATED ORAL DAILY
OUTPATIENT
Start: 2024-03-25

## 2024-03-28 ENCOUNTER — APPOINTMENT (OUTPATIENT)
Dept: CT IMAGING | Facility: HOSPITAL | Age: 34
DRG: 438 | End: 2024-03-28
Payer: MEDICARE

## 2024-03-28 ENCOUNTER — HOSPITAL ENCOUNTER (INPATIENT)
Facility: HOSPITAL | Age: 34
LOS: 3 days | Discharge: HOME | DRG: 438 | End: 2024-03-31
Attending: EMERGENCY MEDICINE | Admitting: INTERNAL MEDICINE
Payer: MEDICARE

## 2024-03-28 ENCOUNTER — APPOINTMENT (OUTPATIENT)
Dept: GENERAL RADIOLOGY | Facility: HOSPITAL | Age: 34
DRG: 438 | End: 2024-03-28
Payer: MEDICARE

## 2024-03-28 DIAGNOSIS — I16.0 HYPERTENSIVE URGENCY: Primary | ICD-10-CM

## 2024-03-28 DIAGNOSIS — E53.8 FOLATE DEFICIENCY: ICD-10-CM

## 2024-03-28 DIAGNOSIS — D50.9 IRON DEFICIENCY ANEMIA, UNSPECIFIED IRON DEFICIENCY ANEMIA TYPE: ICD-10-CM

## 2024-03-28 DIAGNOSIS — D64.9 ACUTE ANEMIA: ICD-10-CM

## 2024-03-28 DIAGNOSIS — N18.6 ESRD ON DIALYSIS: ICD-10-CM

## 2024-03-28 DIAGNOSIS — Z99.2 ESRD ON DIALYSIS: ICD-10-CM

## 2024-03-28 DIAGNOSIS — N18.6 ESRD (END STAGE RENAL DISEASE): ICD-10-CM

## 2024-03-28 LAB
ABO GROUP BLD: NORMAL
ALBUMIN SERPL-MCNC: 2.9 G/DL (ref 3.5–5.2)
ALBUMIN/GLOB SERPL: 0.7 G/DL
ALP SERPL-CCNC: 65 U/L (ref 39–117)
ALT SERPL W P-5'-P-CCNC: 9 U/L (ref 1–33)
ANION GAP SERPL CALCULATED.3IONS-SCNC: 8.7 MMOL/L (ref 5–15)
AST SERPL-CCNC: 16 U/L (ref 1–32)
BASOPHILS # BLD AUTO: 0.02 10*3/MM3 (ref 0–0.2)
BASOPHILS NFR BLD AUTO: 0.4 % (ref 0–1.5)
BILIRUB SERPL-MCNC: 0.3 MG/DL (ref 0–1.2)
BLD GP AB SCN SERPL QL: NEGATIVE
BUN SERPL-MCNC: 12 MG/DL (ref 6–20)
BUN/CREAT SERPL: 3.8 (ref 7–25)
CALCIUM SPEC-SCNC: 8.5 MG/DL (ref 8.6–10.5)
CHLORIDE SERPL-SCNC: 100 MMOL/L (ref 98–107)
CO2 SERPL-SCNC: 28.3 MMOL/L (ref 22–29)
CREAT SERPL-MCNC: 3.14 MG/DL (ref 0.57–1)
DEPRECATED RDW RBC AUTO: 53.3 FL (ref 37–54)
EGFRCR SERPLBLD CKD-EPI 2021: 19.6 ML/MIN/1.73
EOSINOPHIL # BLD AUTO: 0.08 10*3/MM3 (ref 0–0.4)
EOSINOPHIL NFR BLD AUTO: 1.6 % (ref 0.3–6.2)
ERYTHROCYTE [DISTWIDTH] IN BLOOD BY AUTOMATED COUNT: 19.7 % (ref 12.3–15.4)
GLOBULIN UR ELPH-MCNC: 3.9 GM/DL
GLUCOSE SERPL-MCNC: 85 MG/DL (ref 65–99)
HCG SERPL QL: NEGATIVE
HCT VFR BLD AUTO: 23.5 % (ref 34–46.6)
HGB BLD-MCNC: 7.1 G/DL (ref 12–15.9)
IMM GRANULOCYTES # BLD AUTO: 0.04 10*3/MM3 (ref 0–0.05)
IMM GRANULOCYTES NFR BLD AUTO: 0.8 % (ref 0–0.5)
LIPASE SERPL-CCNC: 628 U/L (ref 13–60)
LYMPHOCYTES # BLD AUTO: 0.55 10*3/MM3 (ref 0.7–3.1)
LYMPHOCYTES NFR BLD AUTO: 10.9 % (ref 19.6–45.3)
MCH RBC QN AUTO: 23.8 PG (ref 26.6–33)
MCHC RBC AUTO-ENTMCNC: 30.2 G/DL (ref 31.5–35.7)
MCV RBC AUTO: 78.9 FL (ref 79–97)
MONOCYTES # BLD AUTO: 0.34 10*3/MM3 (ref 0.1–0.9)
MONOCYTES NFR BLD AUTO: 6.8 % (ref 5–12)
NEUTROPHILS NFR BLD AUTO: 4 10*3/MM3 (ref 1.7–7)
NEUTROPHILS NFR BLD AUTO: 79.5 % (ref 42.7–76)
NRBC BLD AUTO-RTO: 0 /100 WBC (ref 0–0.2)
PLATELET # BLD AUTO: 186 10*3/MM3 (ref 140–450)
POTASSIUM SERPL-SCNC: 3.6 MMOL/L (ref 3.5–5.2)
PROT SERPL-MCNC: 6.8 G/DL (ref 6–8.5)
QT INTERVAL: 419 MS
QTC INTERVAL: 478 MS
RBC # BLD AUTO: 2.98 10*6/MM3 (ref 3.77–5.28)
RH BLD: POSITIVE
SODIUM SERPL-SCNC: 137 MMOL/L (ref 136–145)
T&S EXPIRATION DATE: NORMAL
WBC NRBC COR # BLD AUTO: 5.03 10*3/MM3 (ref 3.4–10.8)

## 2024-03-28 PROCEDURE — 86900 BLOOD TYPING SEROLOGIC ABO: CPT | Performed by: PHYSICIAN ASSISTANT

## 2024-03-28 PROCEDURE — 25010000002 HYDROMORPHONE PER 4 MG: Performed by: INTERNAL MEDICINE

## 2024-03-28 PROCEDURE — 86901 BLOOD TYPING SEROLOGIC RH(D): CPT | Performed by: PHYSICIAN ASSISTANT

## 2024-03-28 PROCEDURE — 93010 ELECTROCARDIOGRAM REPORT: CPT | Performed by: INTERNAL MEDICINE

## 2024-03-28 PROCEDURE — 80053 COMPREHEN METABOLIC PANEL: CPT | Performed by: PHYSICIAN ASSISTANT

## 2024-03-28 PROCEDURE — 25010000002 HYDROMORPHONE 1 MG/ML SOLUTION: Performed by: EMERGENCY MEDICINE

## 2024-03-28 PROCEDURE — 71045 X-RAY EXAM CHEST 1 VIEW: CPT

## 2024-03-28 PROCEDURE — 86923 COMPATIBILITY TEST ELECTRIC: CPT

## 2024-03-28 PROCEDURE — 36415 COLL VENOUS BLD VENIPUNCTURE: CPT

## 2024-03-28 PROCEDURE — 25010000002 LABETALOL 5 MG/ML SOLUTION: Performed by: PHYSICIAN ASSISTANT

## 2024-03-28 PROCEDURE — 93005 ELECTROCARDIOGRAM TRACING: CPT | Performed by: PHYSICIAN ASSISTANT

## 2024-03-28 PROCEDURE — 99285 EMERGENCY DEPT VISIT HI MDM: CPT

## 2024-03-28 PROCEDURE — 74176 CT ABD & PELVIS W/O CONTRAST: CPT

## 2024-03-28 PROCEDURE — 84703 CHORIONIC GONADOTROPIN ASSAY: CPT | Performed by: PHYSICIAN ASSISTANT

## 2024-03-28 PROCEDURE — 85025 COMPLETE CBC W/AUTO DIFF WBC: CPT | Performed by: PHYSICIAN ASSISTANT

## 2024-03-28 PROCEDURE — 83690 ASSAY OF LIPASE: CPT | Performed by: PHYSICIAN ASSISTANT

## 2024-03-28 PROCEDURE — 86850 RBC ANTIBODY SCREEN: CPT | Performed by: PHYSICIAN ASSISTANT

## 2024-03-28 RX ORDER — BISACODYL 5 MG/1
5 TABLET, DELAYED RELEASE ORAL DAILY PRN
Status: DISCONTINUED | OUTPATIENT
Start: 2024-03-28 | End: 2024-03-31 | Stop reason: HOSPADM

## 2024-03-28 RX ORDER — UREA 10 %
3 LOTION (ML) TOPICAL NIGHTLY PRN
Status: DISCONTINUED | OUTPATIENT
Start: 2024-03-28 | End: 2024-03-31 | Stop reason: HOSPADM

## 2024-03-28 RX ORDER — CARVEDILOL 25 MG/1
25 TABLET ORAL EVERY 12 HOURS SCHEDULED
Status: DISCONTINUED | OUTPATIENT
Start: 2024-03-29 | End: 2024-03-31 | Stop reason: HOSPADM

## 2024-03-28 RX ORDER — HYDROMORPHONE HYDROCHLORIDE 1 MG/ML
0.5 INJECTION, SOLUTION INTRAMUSCULAR; INTRAVENOUS; SUBCUTANEOUS EVERY 4 HOURS PRN
Status: DISCONTINUED | OUTPATIENT
Start: 2024-03-28 | End: 2024-03-30

## 2024-03-28 RX ORDER — ONDANSETRON 4 MG/1
4 TABLET, ORALLY DISINTEGRATING ORAL EVERY 6 HOURS PRN
Status: DISCONTINUED | OUTPATIENT
Start: 2024-03-28 | End: 2024-03-31 | Stop reason: HOSPADM

## 2024-03-28 RX ORDER — HYDRALAZINE HYDROCHLORIDE 25 MG/1
25 TABLET, FILM COATED ORAL EVERY 12 HOURS SCHEDULED
Status: DISCONTINUED | OUTPATIENT
Start: 2024-03-28 | End: 2024-03-29

## 2024-03-28 RX ORDER — LACOSAMIDE 100 MG/1
100 TABLET ORAL EVERY 12 HOURS SCHEDULED
Status: DISCONTINUED | OUTPATIENT
Start: 2024-03-28 | End: 2024-03-31 | Stop reason: HOSPADM

## 2024-03-28 RX ORDER — HYDRALAZINE HYDROCHLORIDE 25 MG/1
25 TABLET, FILM COATED ORAL ONCE
Status: COMPLETED | OUTPATIENT
Start: 2024-03-28 | End: 2024-03-28

## 2024-03-28 RX ORDER — LISINOPRIL 20 MG/1
20 TABLET ORAL ONCE
Status: COMPLETED | OUTPATIENT
Start: 2024-03-28 | End: 2024-03-28

## 2024-03-28 RX ORDER — LISINOPRIL 20 MG/1
20 TABLET ORAL NIGHTLY
Status: DISCONTINUED | OUTPATIENT
Start: 2024-03-29 | End: 2024-03-30

## 2024-03-28 RX ORDER — ACETAMINOPHEN 325 MG/1
650 TABLET ORAL EVERY 4 HOURS PRN
Status: DISCONTINUED | OUTPATIENT
Start: 2024-03-28 | End: 2024-03-31 | Stop reason: HOSPADM

## 2024-03-28 RX ORDER — POLYETHYLENE GLYCOL 3350 17 G/17G
17 POWDER, FOR SOLUTION ORAL DAILY PRN
Status: DISCONTINUED | OUTPATIENT
Start: 2024-03-28 | End: 2024-03-31 | Stop reason: HOSPADM

## 2024-03-28 RX ORDER — FOLIC ACID 1 MG/1
1 TABLET ORAL DAILY
Status: DISCONTINUED | OUTPATIENT
Start: 2024-03-29 | End: 2024-03-31 | Stop reason: HOSPADM

## 2024-03-28 RX ORDER — OXYCODONE HYDROCHLORIDE AND ACETAMINOPHEN 5; 325 MG/1; MG/1
1 TABLET ORAL EVERY 4 HOURS PRN
Status: DISCONTINUED | OUTPATIENT
Start: 2024-03-28 | End: 2024-03-31 | Stop reason: HOSPADM

## 2024-03-28 RX ORDER — ESCITALOPRAM OXALATE 10 MG/1
10 TABLET ORAL DAILY
Status: DISCONTINUED | OUTPATIENT
Start: 2024-03-29 | End: 2024-03-31 | Stop reason: HOSPADM

## 2024-03-28 RX ORDER — ONDANSETRON 2 MG/ML
4 INJECTION INTRAMUSCULAR; INTRAVENOUS EVERY 6 HOURS PRN
Status: DISCONTINUED | OUTPATIENT
Start: 2024-03-28 | End: 2024-03-31 | Stop reason: HOSPADM

## 2024-03-28 RX ORDER — MYCOPHENOLATE MOFETIL 250 MG/1
250 CAPSULE ORAL EVERY 12 HOURS SCHEDULED
Status: DISCONTINUED | OUTPATIENT
Start: 2024-03-29 | End: 2024-03-31 | Stop reason: HOSPADM

## 2024-03-28 RX ORDER — NITROGLYCERIN 0.4 MG/1
0.4 TABLET SUBLINGUAL
Status: DISCONTINUED | OUTPATIENT
Start: 2024-03-28 | End: 2024-03-31 | Stop reason: HOSPADM

## 2024-03-28 RX ORDER — BISACODYL 10 MG
10 SUPPOSITORY, RECTAL RECTAL DAILY PRN
Status: DISCONTINUED | OUTPATIENT
Start: 2024-03-28 | End: 2024-03-31 | Stop reason: HOSPADM

## 2024-03-28 RX ORDER — LABETALOL HYDROCHLORIDE 5 MG/ML
20 INJECTION, SOLUTION INTRAVENOUS ONCE
Status: COMPLETED | OUTPATIENT
Start: 2024-03-28 | End: 2024-03-28

## 2024-03-28 RX ORDER — ASPIRIN 81 MG/1
81 TABLET, CHEWABLE ORAL DAILY
Status: DISCONTINUED | OUTPATIENT
Start: 2024-03-29 | End: 2024-03-31 | Stop reason: HOSPADM

## 2024-03-28 RX ORDER — HYDROXYCHLOROQUINE SULFATE 200 MG/1
200 TABLET, FILM COATED ORAL DAILY
Status: DISCONTINUED | OUTPATIENT
Start: 2024-03-29 | End: 2024-03-31 | Stop reason: HOSPADM

## 2024-03-28 RX ORDER — AMOXICILLIN 250 MG
2 CAPSULE ORAL 2 TIMES DAILY PRN
Status: DISCONTINUED | OUTPATIENT
Start: 2024-03-28 | End: 2024-03-31 | Stop reason: HOSPADM

## 2024-03-28 RX ADMIN — HYDROMORPHONE HYDROCHLORIDE 1 MG: 1 INJECTION, SOLUTION INTRAMUSCULAR; INTRAVENOUS; SUBCUTANEOUS at 15:57

## 2024-03-28 RX ADMIN — LISINOPRIL 20 MG: 20 TABLET ORAL at 17:43

## 2024-03-28 RX ADMIN — HYDRALAZINE HYDROCHLORIDE 25 MG: 25 TABLET ORAL at 19:27

## 2024-03-28 RX ADMIN — HYDROMORPHONE HYDROCHLORIDE 0.5 MG: 1 INJECTION, SOLUTION INTRAMUSCULAR; INTRAVENOUS; SUBCUTANEOUS at 22:41

## 2024-03-28 RX ADMIN — LABETALOL HYDROCHLORIDE 20 MG: 5 INJECTION, SOLUTION INTRAVENOUS at 15:54

## 2024-03-28 RX ADMIN — HYDRALAZINE HYDROCHLORIDE 25 MG: 25 TABLET ORAL at 17:43

## 2024-03-28 NOTE — ED TRIAGE NOTES
Pt c/o abd pain.  She was seen at gastro and was sent to ER for sbp 240s.  She had dialysis this am

## 2024-03-28 NOTE — Clinical Note
Level of Care: Telemetry [5]   Diagnosis: Pancreatitis [202663]   Admitting Physician: RAMON PEDRO [[id number]]   Attending Physician: RAMON PEDRO [[id number]]   Certification: I certify that inpatient services are medically necessary for this patient for a duration of greater than two midnights. See H&P and MD Progress Notes for additional information about the patient's course of treatment.

## 2024-03-28 NOTE — ED NOTES
Nursing report ED to floor  Dee Herrera  33 y.o.  female    HPI (triage note):   Chief Complaint   Patient presents with    Back Pain     Back and abd pain without nausea       Admitting doctor:   Laurence Cedeno MD    Admitting diagnosis:   The primary encounter diagnosis was Hypertensive urgency. Diagnoses of Acute anemia and ESRD on dialysis were also pertinent to this visit.    Code status:   Current Code Status       Date Active Code Status Order ID Comments User Context       3/28/2024 1755 CPR (Attempt to Resuscitate) [id number]  Laurence Cedeno MD ED        Question Answer    Code Status (Patient has no pulse and is not breathing) CPR (Attempt to Resuscitate)    Medical Interventions (Patient has pulse or is breathing) Full                    Allergies:   Minoxidil    Past Medical History:  Past Medical History:   Diagnosis Date    Anasarca     PER CT SCAN    Anxiety     CHF (congestive heart failure)     Dry skin     ESRD (end stage renal disease) on dialysis     TUES, THURS, SAT [address]    History of abdominal pain     History of anemia     History of transfusion     Hypertension     Iron deficiency anemia 09/27/2021    Lupus (systemic lupus erythematosus) 07/30/2022    Migraine     Other specified nutritional anemias     Pancreatitis     Pericardial effusion     Renal insufficiency     Scoliosis     Seizures     STATES LAST WAS 1/2023    Shortness of breath     OCCASIONAL    Vitamin D deficiency 09/27/2021        Weight:   There were no vitals filed for this visit.    Most recent vitals:   Vitals:    03/28/24 1537 03/28/24 1619 03/28/24 1701 03/28/24 1743   BP:  (!) 207/108 (!) 212/112 (!) 218/114   BP Location:       Patient Position:       Pulse: 71  92 87   Resp:       Temp:       TempSrc:       SpO2:   93%        Active LDAs/IV Access:   Lines, Drains & Airways       Active LDAs       Name Placement date Placement time Site Days    Peripheral IV 03/28/24 1531 Left  Antecubital 03/28/24  1531  Antecubital  less than 1    Hemodialysis Cath Double --  --  Subclavian  --                        Labs (abnormal labs have a star):   Labs Reviewed   COMPREHENSIVE METABOLIC PANEL - Abnormal; Notable for the following components:       Result Value    Creatinine 3.14 (*)     Calcium 8.5 (*)     Albumin 2.9 (*)     BUN/Creatinine Ratio 3.8 (*)     eGFR 19.6 (*)     All other components within normal limits    Narrative:     GFR Normal >60  Chronic Kidney Disease <60  Kidney Failure <15     LIPASE - Abnormal; Notable for the following components:    Lipase 628 (*)     All other components within normal limits   CBC WITH AUTO DIFFERENTIAL - Abnormal; Notable for the following components:    RBC 2.98 (*)     Hemoglobin 7.1 (*)     Hematocrit 23.5 (*)     MCV 78.9 (*)     MCH 23.8 (*)     MCHC 30.2 (*)     RDW 19.7 (*)     Neutrophil % 79.5 (*)     Lymphocyte % 10.9 (*)     Immature Grans % 0.8 (*)     Lymphocytes, Absolute 0.55 (*)     All other components within normal limits   HCG, SERUM, QUALITATIVE - Normal   URINALYSIS W/ MICROSCOPIC IF INDICATED (NO CULTURE)   TYPE AND SCREEN   CBC AND DIFFERENTIAL    Narrative:     The following orders were created for panel order CBC & Differential.  Procedure                               Abnormality         Status                     ---------                               -----------         ------                     CBC Auto Differential[[id number]]        Abnormal            Final result                 Please view results for these tests on the individual orders.       EKG:   ECG 12 Lead Other; htn   Preliminary Result   HEART RATE= 78  bpm   RR Interval= 769  ms   CT Interval= 202  ms   P Horizontal Axis= -53  deg   P Front Axis= 46  deg   QRSD Interval= 95  ms   QT Interval= 419  ms   QTcB= 478  ms   QRS Axis= -45  deg   T Wave Axis= 72  deg   - ABNORMAL ECG -   Sinus rhythm   Borderline prolonged CT interval   Probable left atrial  enlargement   Left anterior fascicular block   Left ventricular hypertrophy   Probable anterior infarct, age indeterminate   Electronically Signed By:    Date and Time of Study: 2024-03-28 15:57:33          Meds given in ED:   Medications   acetaminophen (TYLENOL) tablet 650 mg (has no administration in time range)   ondansetron ODT (ZOFRAN-ODT) disintegrating tablet 4 mg (has no administration in time range)     Or   ondansetron (ZOFRAN) injection 4 mg (has no administration in time range)   melatonin tablet 3 mg (has no administration in time range)   sennosides-docusate (PERICOLACE) 8.6-50 MG per tablet 2 tablet (has no administration in time range)     And   polyethylene glycol (MIRALAX) packet 17 g (has no administration in time range)     And   bisacodyl (DULCOLAX) EC tablet 5 mg (has no administration in time range)     And   bisacodyl (DULCOLAX) suppository 10 mg (has no administration in time range)   labetalol (NORMODYNE,TRANDATE) injection 20 mg (20 mg Intravenous Given 3/28/24 1554)   HYDROmorphone (DILAUDID) injection 1 mg (1 mg Intravenous Given 3/28/24 1557)   hydrALAZINE (APRESOLINE) tablet 25 mg (25 mg Oral Given 3/28/24 1743)   lisinopril (PRINIVIL,ZESTRIL) tablet 20 mg (20 mg Oral Given 3/28/24 1743)       Imaging results:  XR Chest 1 View    Result Date: 3/28/2024  No interval change or convincing evidence for active disease in the chest.  This report was finalized on 3/28/2024 5:17 PM by Dr. Adolph Londono M.D on Workstation: [id number]       Ambulatory status:   - SBA    Social issues:   Social History     Socioeconomic History    Marital status: Single   Tobacco Use    Smoking status: Some Days     Types: Cigars     Passive exposure: Past    Smokeless tobacco: Never    Tobacco comments:     Patient smoked black & mild   Vaping Use    Vaping status: Never Used   Substance and Sexual Activity    Alcohol use: Yes     Alcohol/week: 8.0 standard drinks of alcohol     Types: 2 Glasses of wine, 4  Shots of liquor, 2 Drinks containing 0.5 oz of alcohol per week     Comment: social    Drug use: Yes     Types: Marijuana, Oxycodone     Comment: OCCASIONAL    Sexual activity: Not Currently     Partners: Male     Birth control/protection: Condom, None          NIH Stroke Scale:         Krunal Kapoor RN  03/28/24 18:04 EDT    Nurse Direct line for any questions: [phone]

## 2024-03-28 NOTE — ED PROVIDER NOTES
MD ATTESTATION NOTE    The JANENE and I have discussed this patient's history, physical exam, and treatment plan.  I have reviewed the documentation and personally had a face to face interaction with the patient. I affirm the documentation and agree with the treatment and plan.  The attached note describes my personal findings.      I provided a substantive portion of the care of the patient.  I personally performed the physical exam in its entirety, and below are my findings.  For this patient encounter, the patient wore surgical mask, I wore full protective PPE including N95 and eye protection.      Brief HPI: Patient sent to the emergency department for elevated blood pressure.  Patient is a dialysis patient.  She did dialyze this morning but did not take any of her blood pressure medicines.  Patient was following up with GI for her chronic abdominal pain.  Patient has no chest pain or abdominal pain.  No fevers or chills.    PHYSICAL EXAM  ED Triage Vitals   Temp Heart Rate Resp BP SpO2   03/28/24 1507 03/28/24 1509 03/28/24 1509 03/28/24 1533 03/28/24 1509   99.3 °F (37.4 °C) 62 16 (!) 222/133 93 %      Temp src Heart Rate Source Patient Position BP Location FiO2 (%)   03/28/24 1507 03/28/24 1509 03/28/24 1533 03/28/24 1533 --   Tympanic Monitor Sitting Right leg          GENERAL: no acute distress  HENT: nares patent  EYES: no scleral icterus  CV: regular rhythm, normal rate  RESPIRATORY: normal effort  ABDOMEN: soft  MUSCULOSKELETAL: no deformity  NEURO: alert, moves all extremities, follows commands  PSYCH:  calm, cooperative  SKIN: warm, dry    Vital signs and nursing notes reviewed.    ED Course as of 03/28/24 2024   u Mar 28, 2024   1621 WBC: 5.03 [DC]   1621 Hemoglobin(!): 7.1  Previous admission patient had been down to 6.0 and received 2 units. Most recent hemoglobin at this facility was 8.2 three weeks ago. [DC]   1621 Platelets: 186 [DC]   1632 HCG Qualitative: Negative [DC]   1632 Sodium: 137 [DC]    1632 Potassium: 3.6 [DC]   1758 Discussed case with Dr. Cedeno, [facility], who will admit the patient. [DC]      ED Course User Index  [DC] Maryellen Cureil PA         Plan: admit    SHARED VISIT: This visit was performed by BOTH a physician and an APC. The substantive portion of the medical decision making was performed by this attesting physician who made or approved the management plan and takes responsibility for patient management. All studies in the APC note (if performed) were independently interpreted by me.         Johnathan Hughes MD  03/28/24 2024

## 2024-03-28 NOTE — ED PROVIDER NOTES
EMERGENCY DEPARTMENT ENCOUNTER      PCP: Margarita Woods APRN  Patient Care Team:  Margarita Woods APRN as PCP - General (Nurse Practitioner)  Winnie Sanchez MD as Referring Physician (Obstetrics and Gynecology)  Norberto Almaraz MD PhD as Consulting Physician (Hematology and Oncology)  Lupis Thomas MD (Inactive) as Consulting Physician (Nephrology)  Hair Mckeon MD as Consulting Physician (Nephrology)  Juana Taylor MD as Consulting Physician (Cardiology)   Independent Historians: Patient    HPI:  Chief Complaint: Abdominal pain, hypertension  A complete HPI/ROS/PMH/PSH/SH/FH are unobtainable due to: None    Chronic or social conditions impacting patient care (social determinants of health): ESRD on dialysis    Context: Dee Herrera is a 33 y.o. female with history of lupus, renal failure on dialysis, diastolic CHF, hypertension, severe aortic valve regurgitation who presents to the ED c/o elevated blood pressure at GI office today.  Blood pressure at GI office was 246/152.  Patient states she did have dialysis today prior to GI appointment.  She did not take any of her medications today due to waking up late.  Patient reports generalized abdominal and back pain which is chronic for her.  She normally takes Tylenol at home for pain.  She denies any chest pain, vision changes, headache, fevers, chills.    Review of prior external notes and/or external test results outside of this encounter: Reviewed discharge summary from 3/4/2024.  Patient had been admitted for acute pancreatitis.  They did adjust some of her antihypertensives to include hydralazine twice daily and lisinopril nightly.  She is also prescribed carvedilol.  Patient was to follow-up with GI regarding peripancreatic fluid.      PAST MEDICAL HISTORY  Active Ambulatory Problems     Diagnosis Date Noted    Vitamin D deficiency 09/27/2021    Iron deficiency anemia 09/27/2021    Morning stiffness of joints 04/21/2022    Iron  deficiency anemia, unspecified iron deficiency anemia type 07/11/2022    Thrombocytopenia 07/20/2022    Acute renal failure (ARF) 07/20/2022    Hypertension secondary to other renal disorders 07/20/2022    Pancytopenia 07/20/2022    Hypoalbuminemia 07/20/2022    Volume overload 07/20/2022    Ear drainage right 07/20/2022    T.T.P. 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 difficile colitis 02/14/2023    Anemia, chronic disease 02/18/2023    Essential hypertension 02/18/2023    Peritoneal dialysis catheter in place 04/03/2023    Anemia due to chronic kidney disease, on chronic dialysis 04/03/2023    Alternating constipation and diarrhea 05/23/2023    Abnormal stress test 05/26/2023    Hyponatremia 10/26/2023    Poor appetite 10/26/2023    Moderate malnutrition 10/26/2023    Chronic diastolic CHF (congestive heart failure) 10/26/2023    Aortic valve lesion 10/28/2023    Severe aortic valve regurgitation 10/28/2023    Dissecting ascending aortic aneurysm 10/30/2023    Recent cerebrovascular accident (CVA) 11/17/2023    Internal jugular (IJ) vein thromboembolism, chronic 12/04/2023    Acute heart failure with preserved ejection fraction (HFpEF) 12/09/2023    Acute on chronic anemia 01/06/2024    Symptomatic anemia 01/06/2024    Weakness generalized 01/11/2024    Bilateral low back pain 01/11/2024    Acute pancreatitis 01/26/2024    ESRD (end stage renal disease)  01/26/2024    Thoracic ascending aortic aneurysm 01/29/2024    Heart murmur 02/17/2024    Upper abdominal pain 02/17/2024     Resolved Ambulatory Problems     Diagnosis Date Noted    Anemia, unspecified type 04/21/2022    Elevated troponin 07/20/2022    Nausea and vomiting 07/20/2022    Hypertensive urgency 07/20/2022    Combined systolic and diastolic congestive heart failure 12/08/2022    Pericardial effusion 02/01/2023    Amyloid disease 02/03/2023    Hypertensive urgency 02/14/2023    Abdominal pain 02/18/2023    Sepsis 01/27/2024    Fever 01/29/2024     Past Medical History:   Diagnosis Date    Anasarca     Anxiety     CHF (congestive heart failure)     Dry skin     ESRD (end stage renal disease) on dialysis     History of abdominal pain     History of anemia     History of transfusion     Hypertension     Lupus (systemic lupus erythematosus) 07/30/2022    Migraine     Other specified nutritional anemias     Renal insufficiency     Scoliosis     Seizures     Shortness of breath        The patient qualifies to receive the vaccine, but they have not yet received it.    PAST SURGICAL HISTORY  Past Surgical History:   Procedure Laterality Date    ASCENDING AORTIC ANEURYSM REPAIR W/ MECHANICAL AORTIC VALVE REPLACEMENT N/A 11/2/2023    Procedure: CHETNA STERNOTOMY, AORTIC ROOT REPLACEMENT WITH VALVE SPARING MISHEL PROCEDURE, REPLACEMENT OF ASCENDING AORTA, RIGHT FEMORAL DIALYSIS CATHETER PLACEMENT AND PRP;  Surgeon: Chris Medina MD;  Location: [facility];  Service: Cardiothoracic;  Laterality: N/A;    CARDIAC CATHETERIZATION N/A 06/14/2023    Procedure: Coronary angiography;  Surgeon: Juana Taylor MD;  Location: [facility] CATH INVASIVE LOCATION;  Service: Cardiovascular;  Laterality: N/A;    CARDIAC CATHETERIZATION N/A 06/14/2023    Procedure: Left heart cath;  Surgeon: Juana Taylor MD;  Location: [facility] CATH INVASIVE LOCATION;  Service: Cardiovascular;  Laterality: N/A;    CARDIAC CATHETERIZATION N/A  06/14/2023    Procedure: Right Heart Cath;  Surgeon: Juana Taylor MD;  Location: [facility] CATH INVASIVE LOCATION;  Service: Cardiovascular;  Laterality: N/A;    COLONOSCOPY N/A 7/20/2023    Procedure: COLONOSCOPY to cecum with biopsy;  Surgeon: Drew Kaminski MD;  Location: [facility] ENDOSCOPY;  Service: Gastroenterology;  Laterality: N/A;  PRE - diarrhea, constipation  POST - fair prep, normal    CORONARY ARTERY BYPASS GRAFT N/A 11/6/2023    Procedure: STERNAL EXPLORATION AND WASH OUT;  Surgeon: Jr Mitesh Quiroz MD;  Location: [facility] CVOR;  Service: Cardiothoracic;  Laterality: N/A;    ENDOSCOPY N/A 7/20/2023    Procedure: ESOPHAGOGASTRODUODENOSCOPY with biopsy;  Surgeon: Drew Kaminski MD;  Location: [facility] ENDOSCOPY;  Service: Gastroenterology;  Laterality: N/A;  PRE - abn ct abd  POST - gastritis    INSERTION HEMODIALYSIS CATHETER N/A 07/26/2022    Procedure: RIGHT TUNNELED DIALYSIS CATHETER PLACEMENT;  Surgeon: Diandra Adhikari MD;  Location: [facility] MAIN OR;  Service: Vascular;  Laterality: N/A;    INSERTION HEMODIALYSIS CATHETER Left 11/29/2023    Procedure: TUNNELED DIALYSIS CATHETER PLACEMENT;  Surgeon: Jose Patel II, MD;  Location: [facility] MAIN OR;  Service: Vascular;  Laterality: Left;    INSERTION PERITONEAL DIALYSIS CATHETER N/A 04/03/2023    Procedure: INSERTION PERITONEAL DIALYSIS CATHETER LAPAROSCOPIC, omentumpexy;  Surgeon: Jemal Loyola MD;  Location: [facility] MAIN OR;  Service: General;  Laterality: N/A;    REMOVAL PERITONEAL DIALYSIS CATHETER N/A 12/1/2023    Procedure: REMOVAL PERITONEAL DIALYSIS CATHETER;  Surgeon: Maryellen Ashton MD;  Location: [facility] MAIN OR;  Service: General;  Laterality: N/A;    TONSILLECTOMY           FAMILY HISTORY  Family History   Problem Relation Age of Onset    Autoimmune disease Mother     Anemia Mother     Diabetes Sister     Anemia Brother     Diabetes Maternal Grandmother     Hypertension Maternal Grandmother     Cancer Maternal Grandmother      Sickle cell anemia Cousin     Kevon Hyperthermia Neg Hx          SOCIAL HISTORY  Social History     Socioeconomic History    Marital status: Single   Tobacco Use    Smoking status: Never     Passive exposure: Never    Smokeless tobacco: Never    Tobacco comments:     Patient smoked black & mild   Vaping Use    Vaping status: Never Used   Substance and Sexual Activity    Alcohol use: Yes     Alcohol/week: 8.0 standard drinks of alcohol     Types: 2 Glasses of wine, 4 Shots of liquor, 2 Drinks containing 0.5 oz of alcohol per week     Comment: social    Drug use: Yes     Types: Marijuana, Oxycodone     Comment: OCCASIONAL    Sexual activity: Not Currently     Partners: Male     Birth control/protection: Condom, None         ALLERGIES  Minoxidil        REVIEW OF SYSTEMS  Review of Systems   Constitutional:  Negative for fever.   Eyes:  Negative for visual disturbance.   Cardiovascular:  Negative for chest pain.   Gastrointestinal:  Positive for abdominal pain.   Musculoskeletal:  Positive for back pain.   Neurological:  Negative for headaches.        All systems reviewed and negative except for those discussed in HPI.       PHYSICAL EXAM    I have reviewed the triage vital signs and nursing notes.    ED Triage Vitals   Temp Heart Rate Resp BP SpO2   03/28/24 1507 03/28/24 1509 03/28/24 1509 -- 03/28/24 1509   99.3 °F (37.4 °C) 62 16  93 %      Temp src Heart Rate Source Patient Position BP Location FiO2 (%)   03/28/24 1507 03/28/24 1509 -- -- --   Tympanic Monitor          Physical Exam  GENERAL: alert, chronically ill-appearing  SKIN: Warm, dry  HENT: Normocephalic, atraumatic, catheter access to left upper chest wall  EYES: no scleral icterus  CV: regular rhythm, regular rate  RESPIRATORY: normal effort, lungs clear  ABDOMEN: soft, generalized tenderness to palpation  MUSCULOSKELETAL: no deformity, no pitting edema  NEURO: alert, moves all extremities, follows commands          LAB RESULTS  Recent Results (from  the past 24 hour(s))   Comprehensive Metabolic Panel    Collection Time: 03/28/24  3:54 PM    Specimen: Blood   Result Value Ref Range    Glucose 85 65 - 99 mg/dL    BUN 12 6 - 20 mg/dL    Creatinine 3.14 (H) 0.57 - 1.00 mg/dL    Sodium 137 136 - 145 mmol/L    Potassium 3.6 3.5 - 5.2 mmol/L    Chloride 100 98 - 107 mmol/L    CO2 28.3 22.0 - 29.0 mmol/L    Calcium 8.5 (L) 8.6 - 10.5 mg/dL    Total Protein 6.8 6.0 - 8.5 g/dL    Albumin 2.9 (L) 3.5 - 5.2 g/dL    ALT (SGPT) 9 1 - 33 U/L    AST (SGOT) 16 1 - 32 U/L    Alkaline Phosphatase 65 39 - 117 U/L    Total Bilirubin 0.3 0.0 - 1.2 mg/dL    Globulin 3.9 gm/dL    A/G Ratio 0.7 g/dL    BUN/Creatinine Ratio 3.8 (L) 7.0 - 25.0    Anion Gap 8.7 5.0 - 15.0 mmol/L    eGFR 19.6 (L) >60.0 mL/min/1.73   hCG, Serum, Qualitative    Collection Time: 03/28/24  3:54 PM    Specimen: Blood   Result Value Ref Range    HCG Qualitative Negative Negative   Lipase    Collection Time: 03/28/24  3:54 PM    Specimen: Blood   Result Value Ref Range    Lipase 628 (H) 13 - 60 U/L   CBC Auto Differential    Collection Time: 03/28/24  3:54 PM    Specimen: Blood   Result Value Ref Range    WBC 5.03 3.40 - 10.80 10*3/mm3    RBC 2.98 (L) 3.77 - 5.28 10*6/mm3    Hemoglobin 7.1 (L) 12.0 - 15.9 g/dL    Hematocrit 23.5 (L) 34.0 - 46.6 %    MCV 78.9 (L) 79.0 - 97.0 fL    MCH 23.8 (L) 26.6 - 33.0 pg    MCHC 30.2 (L) 31.5 - 35.7 g/dL    RDW 19.7 (H) 12.3 - 15.4 %    RDW-SD 53.3 37.0 - 54.0 fl    Platelets 186 140 - 450 10*3/mm3    Neutrophil % 79.5 (H) 42.7 - 76.0 %    Lymphocyte % 10.9 (L) 19.6 - 45.3 %    Monocyte % 6.8 5.0 - 12.0 %    Eosinophil % 1.6 0.3 - 6.2 %    Basophil % 0.4 0.0 - 1.5 %    Immature Grans % 0.8 (H) 0.0 - 0.5 %    Neutrophils, Absolute 4.00 1.70 - 7.00 10*3/mm3    Lymphocytes, Absolute 0.55 (L) 0.70 - 3.10 10*3/mm3    Monocytes, Absolute 0.34 0.10 - 0.90 10*3/mm3    Eosinophils, Absolute 0.08 0.00 - 0.40 10*3/mm3    Basophils, Absolute 0.02 0.00 - 0.20 10*3/mm3    Immature Grans,  Absolute 0.04 0.00 - 0.05 10*3/mm3    nRBC 0.0 0.0 - 0.2 /100 WBC   ECG 12 Lead Other; htn    Collection Time: 03/28/24  3:57 PM   Result Value Ref Range    QT Interval 419 ms    QTC Interval 478 ms   Type & Screen    Collection Time: 03/28/24  5:07 PM    Specimen: Arm, Right; Blood   Result Value Ref Range    ABO Type O     RH type Positive     Antibody Screen Negative     T&S Expiration Date 3/31/2024 11:59:59 PM        Ordered the above labs and independently reviewed and interpreted the results.        RADIOLOGY  CT Abdomen Pelvis Without Contrast    Result Date: 3/28/2024  CT ABDOMEN PELVIS WO CONTRAST-  Radiation dose reduction techniques were utilized, including automated exposure control and exposure modulation based on body size.  Clinical: Abdominal pain with elevated lipase. Chronic renal disease  COMPARISON examination 1/26/2024  FINDINGS: 1. There are now 3 serious fluid collections demonstrated adjacent to the pancreatic tail, only 1 of these was present on the previous examination and it was smaller at that time. It currently measures 4.7 cm in maximum diameter. The 2 new collections measure 3.4 and 4 cm respectively. Suspect pseudocyst formation. No pancreatic ductal dilatation is demonstrated. Without intravenous contrast, would be difficult to exclude acute pancreatitis. Some of the borders are ill-defined. Slightly complex free intraperitoneal fluid is again demonstrated throughout the abdomen. The amount is slightly diminished within the interim. There is induration of the omentum as before. There is diffuse body wall edema which is more pronounced compared to the previous examination.  2. The gallbladder is collapsed. The liver is satisfactory and stable in appearance. Spleen is normal in size. The left adrenal gland is thickened, the right adrenal gland is normal. The kidneys are small in size for the patient's given body habitus, they are symmetric and stable. No obstructive uropathy. Diameter  of the aorta is within normal limits.  3. The uterus is retroverted. Caliber of the large and small bowel is within normal limits. No bowel wall thickening identified. There are again mildly enlarged stable left and right external iliac chain nodes. The stomach is satisfactory in appearance, no duodenal abnormality seen.  4. Cardiac enlargement similar to the previous examination. There is a small left-sided pleural effusion. New trace right-sided pleural effusion seen. Minimal atelectasis/infiltrate at both lung bases. The remainder is unremarkable.  This report was finalized on 3/28/2024 7:15 PM by Dr. Jamel Lopez M.D on Workstation: [id number]      XR Chest 1 View    Result Date: 3/28/2024  CHEST SINGLE VIEW  HISTORY: Low back pain and stomach pain. Renal failure.  COMPARISON: CT chest 01/29/2024, AP chest 12/07/2023.  FINDINGS: Left hemodialysis catheter extends into the right atrium and appears without change. Sternotomy wires are present. Heart size is enlarged. Lungs appear clear of focal airspace disease. There is no evidence for pulmonary edema or pleural effusion or infiltrate. There is no evidence for significant change compared to the previous exam.      No interval change or convincing evidence for active disease in the chest.  This report was finalized on 3/28/2024 5:17 PM by Dr. Adolph Londono M.D on Workstation: [id number]       I ordered the above noted radiological studies. Independently reviewed and interpreted by me.  See dictation for official radiology interpretation.      PROCEDURES    Procedures      MEDICATIONS GIVEN IN ER    Medications   acetaminophen (TYLENOL) tablet 650 mg (has no administration in time range)   ondansetron ODT (ZOFRAN-ODT) disintegrating tablet 4 mg (has no administration in time range)     Or   ondansetron (ZOFRAN) injection 4 mg (has no administration in time range)   melatonin tablet 3 mg (has no administration in time range)   sennosides-docusate (PERICOLACE)  8.6-50 MG per tablet 2 tablet (has no administration in time range)     And   polyethylene glycol (MIRALAX) packet 17 g (has no administration in time range)     And   bisacodyl (DULCOLAX) EC tablet 5 mg (has no administration in time range)     And   bisacodyl (DULCOLAX) suppository 10 mg (has no administration in time range)   nitroglycerin (NITROSTAT) SL tablet 0.4 mg (has no administration in time range)   aspirin chewable tablet 81 mg (has no administration in time range)   carvedilol (COREG) tablet 25 mg (25 mg Oral Given 3/29/24 0021)   escitalopram (LEXAPRO) tablet 10 mg (has no administration in time range)   folic acid (FOLVITE) tablet 1 mg (has no administration in time range)   hydrALAZINE (APRESOLINE) tablet 25 mg (0 mg Oral Hold 3/28/24 2310)   hydroxychloroquine (PLAQUENIL) tablet 200 mg (has no administration in time range)   lacosamide (VIMPAT) tablet 100 mg (100 mg Oral Given 3/29/24 0022)   lisinopril (PRINIVIL,ZESTRIL) tablet 20 mg (0 mg Oral Hold 3/28/24 2310)   mycophenolate (CELLCEPT) capsule 250 mg (250 mg Oral Given 3/29/24 0023)   oxyCODONE-acetaminophen (PERCOCET) 5-325 MG per tablet 1 tablet (1 tablet Oral Given 3/29/24 0028)   HYDROmorphone (DILAUDID) injection 0.5 mg (0.5 mg Intravenous Given 3/28/24 2241)   labetalol (NORMODYNE,TRANDATE) injection 20 mg (20 mg Intravenous Given 3/28/24 1554)   HYDROmorphone (DILAUDID) injection 1 mg (1 mg Intravenous Given 3/28/24 1557)   hydrALAZINE (APRESOLINE) tablet 25 mg (25 mg Oral Given 3/28/24 1743)   lisinopril (PRINIVIL,ZESTRIL) tablet 20 mg (20 mg Oral Given 3/28/24 1743)   hydrALAZINE (APRESOLINE) tablet 25 mg (25 mg Oral Given 3/28/24 1927)         PROGRESS, DATA ANALYSIS, CONSULTS, AND MEDICAL DECISION MAKING    All labs have been independently reviewed and interpreted by me.  All radiology studies have been independently reviewed and interpreted by me and discussed with radiologist dictating the report.   EKG's independently reviewed and  interpreted by me.  Discussion below represents my analysis of pertinent findings related to patient's condition, differential diagnosis, treatment plan and final disposition.    My differential diagnosis for abdominal pain includes but is not limited to:    Gastritis, gastroenteritis, peptic ulcer disease, GERD, esophageal perforation, acute appendicitis, mesenteric adenitis, Meckel’s diverticulum, epiploic appendagitis, diverticulitis, colon cancer, ulcerative colitis, Crohn’s disease, intussusception, small bowel obstruction, adhesions, ischemic bowel, perforated viscus, ileus, obstipation, biliary colic, cholecystitis, cholelithiasis, James-Kingsley Robles, hepatitis, pancreatitis, common bile duct obstruction, cholangitis, bile leak, splenic trauma, splenic rupture, splenic infarction, splenic abscess, abdominal abscess, ascites, spontaneous bacterial peritonitis, hernia, UTI, cystitis, ureterolithiasis, urinary obstruction, ovarian cyst, torsion, pregnancy, ectopic pregnancy, PID, pelvic abscess, mittelschmerz, endometriosis, AAA, myocardial infarction, pneumonia, cancer, porphyria, DKA, medications, sickle cell, viral syndrome, herpes zoster      ED Course as of 03/29/24 0047   Thu Mar 28, 2024   1621 WBC: 5.03 [DC]   1621 Hemoglobin(!): 7.1  Previous admission patient had been down to 6.0 and received 2 units. Most recent hemoglobin at this facility was 8.2 three weeks ago. [DC]   1621 Platelets: 186 [DC]   1632 HCG Qualitative: Negative [DC]   1632 Sodium: 137 [DC]   1632 Potassium: 3.6 [DC]   1758 Discussed case with Dr. Cedeno, [facility], who will admit the patient. [DC]      ED Course User Index  [DC] Maryellen Curiel PA             AS OF 00:47 EDT VITALS:    BP - 153/94  HR - 99  TEMP - 98.6 °F (37 °C) (Oral)  O2 SATS - 100%        DIAGNOSIS  Final diagnoses:   Hypertensive urgency   Acute anemia   ESRD on dialysis         DISPOSITION  ED Disposition       ED Disposition   Decision to Admit    Condition   --     Comment   Level of Care: Telemetry [5]   Diagnosis: Acute pancreatitis [577.0.ICD-9-CM]   Admitting Physician: RAMON PEDRO [[id number]]   Attending Physician: RAMON PEDRO [[id number]]   Certification: I Certify That Inpatient Hospital Services Are Medically Necessary For Greater Than 2 Midnights                    Note Disclaimer: At [facility], we believe that sharing information builds trust and better relationships. You are receiving this note because you recently visited [facility]. It is possible you will see health information before a provider has talked with you about it. This kind of information can be easy to misunderstand. To help you fully understand what it means for your health, we urge you to discuss this note with your provider.         Maryellen Curiel PA  03/29/24 0048

## 2024-03-29 PROBLEM — K86.1 ACUTE ON CHRONIC PANCREATITIS: Status: ACTIVE | Noted: 2023-02-01

## 2024-03-29 LAB
ANION GAP SERPL CALCULATED.3IONS-SCNC: 9 MMOL/L (ref 5–15)
BUN SERPL-MCNC: 19 MG/DL (ref 6–20)
BUN/CREAT SERPL: 4.5 (ref 7–25)
CALCIUM SPEC-SCNC: 7.8 MG/DL (ref 8.6–10.5)
CHLORIDE SERPL-SCNC: 107 MMOL/L (ref 98–107)
CO2 SERPL-SCNC: 26 MMOL/L (ref 22–29)
CREAT SERPL-MCNC: 4.18 MG/DL (ref 0.57–1)
DEPRECATED RDW RBC AUTO: 53.5 FL (ref 37–54)
EGFRCR SERPLBLD CKD-EPI 2021: 13.9 ML/MIN/1.73
ERYTHROCYTE [DISTWIDTH] IN BLOOD BY AUTOMATED COUNT: 19.5 % (ref 12.3–15.4)
GLUCOSE SERPL-MCNC: 85 MG/DL (ref 65–99)
HCT VFR BLD AUTO: 21.8 % (ref 34–46.6)
HGB BLD-MCNC: 6.5 G/DL (ref 12–15.9)
LIPASE SERPL-CCNC: 529 U/L (ref 13–60)
MCH RBC QN AUTO: 23.6 PG (ref 26.6–33)
MCHC RBC AUTO-ENTMCNC: 29.8 G/DL (ref 31.5–35.7)
MCV RBC AUTO: 79.3 FL (ref 79–97)
PLATELET # BLD AUTO: 163 10*3/MM3 (ref 140–450)
PMV BLD AUTO: 9.5 FL (ref 6–12)
POTASSIUM SERPL-SCNC: 4.2 MMOL/L (ref 3.5–5.2)
RBC # BLD AUTO: 2.75 10*6/MM3 (ref 3.77–5.28)
SODIUM SERPL-SCNC: 142 MMOL/L (ref 136–145)
WBC NRBC COR # BLD AUTO: 3.38 10*3/MM3 (ref 3.4–10.8)

## 2024-03-29 PROCEDURE — 86900 BLOOD TYPING SEROLOGIC ABO: CPT

## 2024-03-29 PROCEDURE — 25010000002 HYDROMORPHONE PER 4 MG: Performed by: INTERNAL MEDICINE

## 2024-03-29 PROCEDURE — 63710000001 DIPHENHYDRAMINE PER 50 MG: Performed by: INTERNAL MEDICINE

## 2024-03-29 PROCEDURE — P9016 RBC LEUKOCYTES REDUCED: HCPCS

## 2024-03-29 PROCEDURE — 99222 1ST HOSP IP/OBS MODERATE 55: CPT | Performed by: PHYSICIAN ASSISTANT

## 2024-03-29 PROCEDURE — 83690 ASSAY OF LIPASE: CPT | Performed by: NURSE PRACTITIONER

## 2024-03-29 PROCEDURE — 85027 COMPLETE CBC AUTOMATED: CPT | Performed by: INTERNAL MEDICINE

## 2024-03-29 PROCEDURE — 36415 COLL VENOUS BLD VENIPUNCTURE: CPT | Performed by: INTERNAL MEDICINE

## 2024-03-29 PROCEDURE — 36430 TRANSFUSION BLD/BLD COMPNT: CPT

## 2024-03-29 PROCEDURE — 80048 BASIC METABOLIC PNL TOTAL CA: CPT | Performed by: INTERNAL MEDICINE

## 2024-03-29 PROCEDURE — 63710000001 MYCOPHENOLATE MOFETIL PER 250 MG: Performed by: INTERNAL MEDICINE

## 2024-03-29 RX ORDER — DIPHENHYDRAMINE HCL 25 MG
25 CAPSULE ORAL EVERY 6 HOURS PRN
Status: DISCONTINUED | OUTPATIENT
Start: 2024-03-29 | End: 2024-03-31 | Stop reason: HOSPADM

## 2024-03-29 RX ORDER — HYDRALAZINE HYDROCHLORIDE 25 MG/1
25 TABLET, FILM COATED ORAL 3 TIMES DAILY
Status: DISCONTINUED | OUTPATIENT
Start: 2024-03-29 | End: 2024-03-31 | Stop reason: HOSPADM

## 2024-03-29 RX ADMIN — LACOSAMIDE 100 MG: 100 TABLET, FILM COATED ORAL at 20:32

## 2024-03-29 RX ADMIN — LACOSAMIDE 100 MG: 100 TABLET, FILM COATED ORAL at 00:22

## 2024-03-29 RX ADMIN — OXYCODONE AND ACETAMINOPHEN 1 TABLET: 5; 325 TABLET ORAL at 09:10

## 2024-03-29 RX ADMIN — HYDRALAZINE HYDROCHLORIDE 25 MG: 25 TABLET ORAL at 20:32

## 2024-03-29 RX ADMIN — CARVEDILOL 25 MG: 25 TABLET, FILM COATED ORAL at 00:21

## 2024-03-29 RX ADMIN — LACOSAMIDE 100 MG: 100 TABLET, FILM COATED ORAL at 08:55

## 2024-03-29 RX ADMIN — MYCOPHENOLATE MOFETIL 250 MG: 250 CAPSULE ORAL at 00:23

## 2024-03-29 RX ADMIN — HYDROMORPHONE HYDROCHLORIDE 0.5 MG: 1 INJECTION, SOLUTION INTRAMUSCULAR; INTRAVENOUS; SUBCUTANEOUS at 16:42

## 2024-03-29 RX ADMIN — HYDROMORPHONE HYDROCHLORIDE 0.5 MG: 1 INJECTION, SOLUTION INTRAMUSCULAR; INTRAVENOUS; SUBCUTANEOUS at 06:40

## 2024-03-29 RX ADMIN — CARVEDILOL 25 MG: 25 TABLET, FILM COATED ORAL at 20:32

## 2024-03-29 RX ADMIN — HYDROMORPHONE HYDROCHLORIDE 0.5 MG: 1 INJECTION, SOLUTION INTRAMUSCULAR; INTRAVENOUS; SUBCUTANEOUS at 10:33

## 2024-03-29 RX ADMIN — CARVEDILOL 25 MG: 25 TABLET, FILM COATED ORAL at 08:56

## 2024-03-29 RX ADMIN — HYDRALAZINE HYDROCHLORIDE 25 MG: 25 TABLET ORAL at 16:39

## 2024-03-29 RX ADMIN — DIPHENHYDRAMINE HYDROCHLORIDE 25 MG: 25 CAPSULE ORAL at 08:32

## 2024-03-29 RX ADMIN — HYDROMORPHONE HYDROCHLORIDE 0.5 MG: 1 INJECTION, SOLUTION INTRAMUSCULAR; INTRAVENOUS; SUBCUTANEOUS at 20:42

## 2024-03-29 RX ADMIN — HYDRALAZINE HYDROCHLORIDE 25 MG: 25 TABLET ORAL at 08:56

## 2024-03-29 RX ADMIN — ESCITALOPRAM OXALATE 10 MG: 10 TABLET, FILM COATED ORAL at 08:56

## 2024-03-29 RX ADMIN — MYCOPHENOLATE MOFETIL 250 MG: 250 CAPSULE ORAL at 20:32

## 2024-03-29 RX ADMIN — FOLIC ACID 1 MG: 1 TABLET ORAL at 08:56

## 2024-03-29 RX ADMIN — ASPIRIN 81 MG: 81 TABLET, CHEWABLE ORAL at 08:56

## 2024-03-29 RX ADMIN — OXYCODONE AND ACETAMINOPHEN 1 TABLET: 5; 325 TABLET ORAL at 00:28

## 2024-03-29 RX ADMIN — LISINOPRIL 20 MG: 20 TABLET ORAL at 20:32

## 2024-03-29 RX ADMIN — HYDROXYCHLOROQUINE SULFATE 200 MG: 200 TABLET, FILM COATED ORAL at 08:56

## 2024-03-29 NOTE — H&P
HISTORY AND PHYSICAL   [facility]        Date of Admission: 3/28/2024  Patient Identification:  Name: Dee Herrera  Age: 33 y.o.  Sex: female  :  1990  MRN: [id number]                     Primary Care Physician: Margarita Woods APRN    Chief Complaint:  33 year old female presented to the emergency room with abdominal pain; she was seen by gi for folluw up due to a history of pancreatitis; while there her blood pressure was very high; she has a history of hypertension but had not taken her medication today; she also has esrd; she denies fever or chills;     History of Present Illness:   As above    Past Medical History:  Past Medical History:   Diagnosis Date    Anasarca     PER CT SCAN    Anxiety     CHF (congestive heart failure)     Dry skin     ESRD (end stage renal disease) on dialysis     TUES, THURS, SAT [address]    History of abdominal pain     History of anemia     History of transfusion     Hypertension     Iron deficiency anemia 2021    Lupus (systemic lupus erythematosus) 2022    Migraine     Other specified nutritional anemias     Pancreatitis     Pericardial effusion     Renal insufficiency     Scoliosis     Seizures     STATES LAST WAS 2023    Shortness of breath     OCCASIONAL    Vitamin D deficiency 2021     Past Surgical History:  Past Surgical History:   Procedure Laterality Date    ASCENDING AORTIC ANEURYSM REPAIR W/ MECHANICAL AORTIC VALVE REPLACEMENT N/A 2023    Procedure: CHETNA STERNOTOMY, AORTIC ROOT REPLACEMENT WITH VALVE SPARING MISHEL PROCEDURE, REPLACEMENT OF ASCENDING AORTA, RIGHT FEMORAL DIALYSIS CATHETER PLACEMENT AND PRP;  Surgeon: Chris Medina MD;  Location: [facility] CVOR;  Service: Cardiothoracic;  Laterality: N/A;    CARDIAC CATHETERIZATION N/A 2023    Procedure: Coronary angiography;  Surgeon: Juana Taylor MD;  Location: [facility] CATH INVASIVE LOCATION;  Service: Cardiovascular;  Laterality: N/A;    CARDIAC  CATHETERIZATION N/A 06/14/2023    Procedure: Left heart cath;  Surgeon: Juana Taylor MD;  Location: [facility] CATH INVASIVE LOCATION;  Service: Cardiovascular;  Laterality: N/A;    CARDIAC CATHETERIZATION N/A 06/14/2023    Procedure: Right Heart Cath;  Surgeon: Juana Taylor MD;  Location: [facility] CATH INVASIVE LOCATION;  Service: Cardiovascular;  Laterality: N/A;    COLONOSCOPY N/A 7/20/2023    Procedure: COLONOSCOPY to cecum with biopsy;  Surgeon: Drew Kaminski MD;  Location: [facility] ENDOSCOPY;  Service: Gastroenterology;  Laterality: N/A;  PRE - diarrhea, constipation  POST - fair prep, normal    CORONARY ARTERY BYPASS GRAFT N/A 11/6/2023    Procedure: STERNAL EXPLORATION AND WASH OUT;  Surgeon: Jr Mitesh Quiroz MD;  Location: [facility] CVOR;  Service: Cardiothoracic;  Laterality: N/A;    ENDOSCOPY N/A 7/20/2023    Procedure: ESOPHAGOGASTRODUODENOSCOPY with biopsy;  Surgeon: Drew Kaminski MD;  Location: [facility] ENDOSCOPY;  Service: Gastroenterology;  Laterality: N/A;  PRE - abn ct abd  POST - gastritis    INSERTION HEMODIALYSIS CATHETER N/A 07/26/2022    Procedure: RIGHT TUNNELED DIALYSIS CATHETER PLACEMENT;  Surgeon: Diandra Adhikari MD;  Location: [facility] MAIN OR;  Service: Vascular;  Laterality: N/A;    INSERTION HEMODIALYSIS CATHETER Left 11/29/2023    Procedure: TUNNELED DIALYSIS CATHETER PLACEMENT;  Surgeon: Jose Patel II, MD;  Location: [facility] MAIN OR;  Service: Vascular;  Laterality: Left;    INSERTION PERITONEAL DIALYSIS CATHETER N/A 04/03/2023    Procedure: INSERTION PERITONEAL DIALYSIS CATHETER LAPAROSCOPIC, omentumpexy;  Surgeon: Jemal Loyola MD;  Location: [facility] MAIN OR;  Service: General;  Laterality: N/A;    REMOVAL PERITONEAL DIALYSIS CATHETER N/A 12/1/2023    Procedure: REMOVAL PERITONEAL DIALYSIS CATHETER;  Surgeon: Maryellen Ashton MD;  Location: [facility] MAIN OR;  Service: General;  Laterality: N/A;    TONSILLECTOMY        Home Meds:  Medications Prior to Admission    Medication Sig Dispense Refill Last Dose    aspirin 81 MG chewable tablet Chew 1 tablet Daily.   3/27/2024 at 1800    carvedilol (COREG) 25 MG tablet Take 1 tablet by mouth Every 12 (Twelve) Hours. 60 tablet 0 3/27/2024 at 1800    Diclofenac Sodium (VOLTAREN) 1 % gel gel Apply 4 g topically to the appropriate area as directed 3 (Three) Times a Day. 150 g 0 3/27/2024 at 1800    escitalopram (LEXAPRO) 10 MG tablet Take 1 tablet by mouth Daily.   3/27/2024 at 1800    hydrALAZINE (APRESOLINE) 25 MG tablet Take 1 tablet by mouth Every 12 (Twelve) Hours. 60 tablet 0 3/27/2024 at 1800    hydroxychloroquine (PLAQUENIL) 200 MG tablet Take 1 tablet by mouth Daily.   3/27/2024 at 1600    lacosamide (VIMPAT) 100 MG tablet tablet Take 1 tablet by mouth Every 12 (Twelve) Hours. 6 tablet 0 3/27/2024 at 1600    lisinopril (PRINIVIL,ZESTRIL) 20 MG tablet Take 1 tablet by mouth Every Night. 30 tablet 0 3/27/2024 at 1600    mycophenolate (CELLCEPT) 500 MG tablet Take 0.5 tablets by mouth Every 12 (Twelve) Hours. 30 tablet 0 3/27/2024 at 1600    oxyCODONE-acetaminophen (PERCOCET) 5-325 MG per tablet Take 1 tablet by mouth Every 4 (Four) Hours As Needed for Moderate Pain. 12 tablet 0 3/27/2024 at 1800    polyethylene glycol (MIRALAX) 17 GM/SCOOP powder Take 17 g by mouth As Needed.   Past Month    folic acid (FOLVITE) 1 MG tablet Take 1 tablet by mouth Daily. (Patient not taking: Reported on 3/28/2024) 90 tablet 1 Not Taking    ondansetron (Zofran) 4 MG tablet Take 1 tablet by mouth Every 8 (Eight) Hours As Needed for Nausea or Vomiting. 30 tablet 1 More than a month    ondansetron ODT (ZOFRAN-ODT) 4 MG disintegrating tablet Place 1 tablet on the tongue Every 8 (Eight) Hours As Needed for Vomiting or Nausea. 30 tablet 0 More than a month       Allergies:  Allergies   Allergen Reactions    Minoxidil Hives and Other (See Comments)     Pericardial effusion .     Immunizations:  Immunization History   Administered Date(s) Administered     Fluzone (or Fluarix & Flulaval for VFC) >6mos 10/22/2022    HPV Quadrivalent 04/18/2008, 04/20/2009    Hepatitis B 2 Dose Vaccine Heplisav-B 01/21/2023, 03/16/2023    Influenza TIV (IM) 04/20/2009, 09/14/2009    MCV4 Unspecified 03/29/2007    Meningococcal MCV4P (Menactra) 03/29/2007    Tdap 06/10/2009     Social History:   Social History     Social History Narrative    Not on file     Social History     Socioeconomic History    Marital status: Single   Tobacco Use    Smoking status: Never     Passive exposure: Never    Smokeless tobacco: Never    Tobacco comments:     Patient smoked black & mild   Vaping Use    Vaping status: Never Used   Substance and Sexual Activity    Alcohol use: Yes     Alcohol/week: 8.0 standard drinks of alcohol     Types: 2 Glasses of wine, 4 Shots of liquor, 2 Drinks containing 0.5 oz of alcohol per week     Comment: social    Drug use: Yes     Types: Marijuana, Oxycodone     Comment: OCCASIONAL    Sexual activity: Not Currently     Partners: Male     Birth control/protection: Condom, None       Family History:  Family History   Problem Relation Age of Onset    Autoimmune disease Mother     Anemia Mother     Diabetes Sister     Anemia Brother     Diabetes Maternal Grandmother     Hypertension Maternal Grandmother     Cancer Maternal Grandmother     Sickle cell anemia Cousin     Malig Hyperthermia Neg Hx         Review of Systems  See history of present illness and past medical history.  Patient denies headache, dizziness, syncope, falls, trauma, change in vision, change in hearing, change in taste, changes in weight, changes in appetite, focal weakness, numbness, or paresthesia.  Patient denies chest pain, palpitations, dyspnea, orthopnea, PND, cough, sinus pressure, rhinorrhea, epistaxis, hemoptysis, nausea, vomiting,hematemesis, diarrhea, constipation or hematochezia.  Denies cold or heat intolerance, polydipsia, polyuria, polyphagia. Denies hematuria, pyuria, dysuria, hesitancy,  "frequency or urgency. Denies consumption of raw and under cooked meats foods or change in water source.       Objective:  T Max 24 hrs: Temp (24hrs), Av.7 °F (37.1 °C), Min:97.8 °F (36.6 °C), Max:99.3 °F (37.4 °C)    Vitals Ranges:   Temp:  [97.8 °F (36.6 °C)-99.3 °F (37.4 °C)] 98.6 °F (37 °C)  Heart Rate:  [62-92] 85  Resp:  [16] 16  BP: (156-246)/(106-152) 156/109      Exam:  BP (!) 156/109 (BP Location: Right arm, Patient Position: Lying)   Pulse 85   Temp 98.6 °F (37 °C) (Oral)   Resp 16   Ht 167.6 cm (66\")   Wt 53.5 kg (117 lb 15.1 oz)   SpO2 98%   BMI 19.04 kg/m²     General Appearance:    Alert, cooperative, no distress, appears stated age   Head:    Normocephalic, without obvious abnormality, atraumatic   Eyes:    PERRL, conjunctivae/corneas clear, EOM's intact, both eyes   Ears:    Normal external ear canals, both ears   Nose:   Nares normal, septum midline, mucosa normal, no drainage    or sinus tenderness   Throat:   Lips, mucosa, and tongue normal   Neck:   Supple, symmetrical, trachea midline, no adenopathy;     thyroid:  no enlargement/tenderness/nodules; no carotid    bruit or JVD   Back:     Symmetric, no curvature, ROM normal, no CVA tenderness   Lungs:    Decreased breath sounds bilaterally, respirations unlabored   Chest Wall:    No tenderness or deformity    Heart:    Regular rate and rhythm, S1 and S2 normal, no murmur, rub   or gallop   Abdomen:     Soft, nontender, bowel sounds active all four quadrants,     no masses, no hepatomegaly, no splenomegaly   Extremities:   Extremities normal, atraumatic, no cyanosis or edema                       .    Data Review:  Labs in chart were reviewed.  WBC   Date Value Ref Range Status   2024 5.03 3.40 - 10.80 10*3/mm3 Final     Hemoglobin   Date Value Ref Range Status   2024 7.1 (L) 12.0 - 15.9 g/dL Final     Hematocrit   Date Value Ref Range Status   2024 23.5 (L) 34.0 - 46.6 % Final     Platelets   Date Value Ref Range " Status   03/28/2024 186 140 - 450 10*3/mm3 Final     Sodium   Date Value Ref Range Status   03/28/2024 137 136 - 145 mmol/L Final     Potassium   Date Value Ref Range Status   03/28/2024 3.6 3.5 - 5.2 mmol/L Final     Comment:     Slight hemolysis detected by analyzer. Result may be falsely elevated.     Chloride   Date Value Ref Range Status   03/28/2024 100 98 - 107 mmol/L Final     CO2   Date Value Ref Range Status   03/28/2024 28.3 22.0 - 29.0 mmol/L Final     BUN   Date Value Ref Range Status   03/28/2024 12 6 - 20 mg/dL Final     Creatinine   Date Value Ref Range Status   03/28/2024 3.14 (H) 0.57 - 1.00 mg/dL Final     Glucose   Date Value Ref Range Status   03/28/2024 85 65 - 99 mg/dL Final     Calcium   Date Value Ref Range Status   03/28/2024 8.5 (L) 8.6 - 10.5 mg/dL Final                Imaging Results (All)       Procedure Component Value Units Date/Time    CT Abdomen Pelvis Without Contrast [[id number]] Collected: 03/28/24 1904     Updated: 03/28/24 1918    Narrative:      CT ABDOMEN PELVIS WO CONTRAST-     Radiation dose reduction techniques were utilized, including automated  exposure control and exposure modulation based on body size.     Clinical: Abdominal pain with elevated lipase. Chronic renal disease     COMPARISON examination 1/26/2024     FINDINGS:  1. There are now 3 serious fluid collections demonstrated adjacent to  the pancreatic tail, only 1 of these was present on the previous  examination and it was smaller at that time. It currently measures 4.7  cm in maximum diameter. The 2 new collections measure 3.4 and 4 cm  respectively. Suspect pseudocyst formation. No pancreatic ductal  dilatation is demonstrated. Without intravenous contrast, would be  difficult to exclude acute pancreatitis. Some of the borders are  ill-defined. Slightly complex free intraperitoneal fluid is again  demonstrated throughout the abdomen. The amount is slightly diminished  within the interim. There is induration  of the omentum as before. There  is diffuse body wall edema which is more pronounced compared to the  previous examination.     2. The gallbladder is collapsed. The liver is satisfactory and stable in  appearance. Spleen is normal in size. The left adrenal gland is  thickened, the right adrenal gland is normal. The kidneys are small in  size for the patient's given body habitus, they are symmetric and  stable. No obstructive uropathy. Diameter of the aorta is within normal  limits.     3. The uterus is retroverted. Caliber of the large and small bowel is  within normal limits. No bowel wall thickening identified. There are  again mildly enlarged stable left and right external iliac chain nodes.  The stomach is satisfactory in appearance, no duodenal abnormality seen.     4. Cardiac enlargement similar to the previous examination. There is a  small left-sided pleural effusion. New trace right-sided pleural  effusion seen. Minimal atelectasis/infiltrate at both lung bases. The  remainder is unremarkable.     This report was finalized on 3/28/2024 7:15 PM by Dr. Jamel Lopez M.D  on Workstation: [id number]       XR Chest 1 View [[id number]] Collected: 03/28/24 1713     Updated: 03/28/24 1720    Narrative:      CHEST SINGLE VIEW     HISTORY: Low back pain and stomach pain. Renal failure.     COMPARISON: CT chest 01/29/2024, AP chest 12/07/2023.     FINDINGS: Left hemodialysis catheter extends into the right atrium and  appears without change. Sternotomy wires are present. Heart size is  enlarged. Lungs appear clear of focal airspace disease. There is no  evidence for pulmonary edema or pleural effusion or infiltrate. There is  no evidence for significant change compared to the previous exam.       Impression:      No interval change or convincing evidence for active disease  in the chest.     This report was finalized on 3/28/2024 5:17 PM by Dr. Adolph Londono M.D on Workstation: [id number]                  Assessment:  Active Hospital Problems    Diagnosis  POA    **Pancreatitis [K85.90]  Yes    Acute pancreatitis [K85.90]  Yes      Resolved Hospital Problems   No resolved problems to display.   Esrd   Hypertension  Lupus  Anemia  Abdominal pain  Chronic diastolic chf    Plan:  Gi to see her  Npo  Pain control  Monitor on telemetry  Reorder home bp meds  Dw patient and ed provider      Laurence Cedeno MD  3/28/2024  22:23 EDT

## 2024-03-29 NOTE — PLAN OF CARE
Goal Outcome Evaluation:  Plan of Care Reviewed With: patient        Progress: no change  Outcome Evaluation: VSS; A&Ox4. Pt received up from ED with c/o of abdominal pain. Pt pain controlled with prn pain med. Pt remains on room air and SR on the monitor. Pt NPO since midnight per order. Pt Hbg 6.5 this am. Pt expresses no additional needs at this time. Plan of care is ongoing.

## 2024-03-29 NOTE — CONSULTS
[facility] Consult Note      Patient Name: Dee Herrera  : 1990  MRN: [id number]  Primary Care Physician:  Margarita Woods APRN  Referring Physician: Johnathan Hughes MD  Date of admission: 3/28/2024    Subjective     Reason for Consult:  ESRD    HPI:   Dee Herrera is a 33 y.o. female with ESRD (TTS; [address]; TDC left chest; Dr. Mckeon of our group), SLE on I-S (CellCept and Plaquenil), hypertension, seizure disorder, and chronic pancreatitis on narcotics, admitted yesterday from outpatient GI office for further evaluation of severe hypertension; she had forgotten to take her blood pressure medicines yesterday.  Last HD was yesterday.  Full PMH outlined below.    No headache or vision problems   Still has abdominal pain, but kept food down last night and is hungry this morning  No problems with HD yesterday  No shortness of breath or orthopnea; no leg swelling  No fever or chills   Still makes some urine  Complains of diffuse itching, which is new since arrival      Review of Systems:   14 point review of systems is otherwise negative except for mentioned above on HPI    Personal History     Past Medical History:   Diagnosis Date    Anasarca     PER CT SCAN    Anxiety     CHF (congestive heart failure)     Dry skin     ESRD (end stage renal disease) on dialysis     TUES, THURS, SAT [address]    History of abdominal pain     History of anemia     History of transfusion     Hypertension     Iron deficiency anemia 2021    Lupus (systemic lupus erythematosus) 2022    Migraine     Other specified nutritional anemias     Pancreatitis     Pericardial effusion     Renal insufficiency     Scoliosis     Seizures     STATES LAST WAS 2023    Shortness of breath     OCCASIONAL    Vitamin D deficiency 2021       Past Surgical History:   Procedure Laterality Date    ASCENDING AORTIC ANEURYSM REPAIR W/ MECHANICAL AORTIC VALVE REPLACEMENT N/A  11/2/2023    Procedure: CHETNA STERNOTOMY, AORTIC ROOT REPLACEMENT WITH VALVE SPARING MISHEL PROCEDURE, REPLACEMENT OF ASCENDING AORTA, RIGHT FEMORAL DIALYSIS CATHETER PLACEMENT AND PRP;  Surgeon: Chris Medina MD;  Location: [facility];  Service: Cardiothoracic;  Laterality: N/A;    CARDIAC CATHETERIZATION N/A 06/14/2023    Procedure: Coronary angiography;  Surgeon: Juana Taylor MD;  Location: [facility] CATH INVASIVE LOCATION;  Service: Cardiovascular;  Laterality: N/A;    CARDIAC CATHETERIZATION N/A 06/14/2023    Procedure: Left heart cath;  Surgeon: Juana Taylor MD;  Location: [facility] CATH INVASIVE LOCATION;  Service: Cardiovascular;  Laterality: N/A;    CARDIAC CATHETERIZATION N/A 06/14/2023    Procedure: Right Heart Cath;  Surgeon: Juana Taylor MD;  Location: [facility] CATH INVASIVE LOCATION;  Service: Cardiovascular;  Laterality: N/A;    COLONOSCOPY N/A 7/20/2023    Procedure: COLONOSCOPY to cecum with biopsy;  Surgeon: Drew Kaminski MD;  Location: [facility] ENDOSCOPY;  Service: Gastroenterology;  Laterality: N/A;  PRE - diarrhea, constipation  POST - fair prep, normal    CORONARY ARTERY BYPASS GRAFT N/A 11/6/2023    Procedure: STERNAL EXPLORATION AND WASH OUT;  Surgeon: Jr Mitesh Quiroz MD;  Location: [facility];  Service: Cardiothoracic;  Laterality: N/A;    ENDOSCOPY N/A 7/20/2023    Procedure: ESOPHAGOGASTRODUODENOSCOPY with biopsy;  Surgeon: Drew Kaminski MD;  Location: [facility] ENDOSCOPY;  Service: Gastroenterology;  Laterality: N/A;  PRE - abn ct abd  POST - gastritis    INSERTION HEMODIALYSIS CATHETER N/A 07/26/2022    Procedure: RIGHT TUNNELED DIALYSIS CATHETER PLACEMENT;  Surgeon: Diandra Adhikari MD;  Location: [facility] MAIN OR;  Service: Vascular;  Laterality: N/A;    INSERTION HEMODIALYSIS CATHETER Left 11/29/2023    Procedure: TUNNELED DIALYSIS CATHETER PLACEMENT;  Surgeon: Jose Patel II, MD;  Location: [facility] MAIN OR;  Service: Vascular;  Laterality: Left;    INSERTION PERITONEAL  DIALYSIS CATHETER N/A 04/03/2023    Procedure: INSERTION PERITONEAL DIALYSIS CATHETER LAPAROSCOPIC, omentumpexy;  Surgeon: Jemal Loyola MD;  Location: [facility] MAIN OR;  Service: General;  Laterality: N/A;    REMOVAL PERITONEAL DIALYSIS CATHETER N/A 12/1/2023    Procedure: REMOVAL PERITONEAL DIALYSIS CATHETER;  Surgeon: Maryellen Ashton MD;  Location: [facility] MAIN OR;  Service: General;  Laterality: N/A;    TONSILLECTOMY         Family History: family history includes Anemia in her brother and mother; Autoimmune disease in her mother; Cancer in her maternal grandmother; Diabetes in her maternal grandmother and sister; Hypertension in her maternal grandmother; Sickle cell anemia in her cousin.    Social History:  reports that she has never smoked. She has never been exposed to tobacco smoke. She has never used smokeless tobacco. She reports current alcohol use of about 8.0 standard drinks of alcohol per week. She reports current drug use. Drugs: Marijuana and Oxycodone.    Home Medications:  Prior to Admission medications    Medication Sig Start Date End Date Taking? Authorizing Provider   aspirin 81 MG chewable tablet Chew 1 tablet Daily. 12/10/23  Yes Bobby Carter MD   carvedilol (COREG) 25 MG tablet Take 1 tablet by mouth Every 12 (Twelve) Hours. 2/18/23  Yes Hernan Corcoran,    Diclofenac Sodium (VOLTAREN) 1 % gel gel Apply 4 g topically to the appropriate area as directed 3 (Three) Times a Day. 2/6/24  Yes Margarita Woods APRN   escitalopram (LEXAPRO) 10 MG tablet Take 1 tablet by mouth Daily. 12/10/23  Yes Bobby Carter MD   hydrALAZINE (APRESOLINE) 25 MG tablet Take 1 tablet by mouth Every 12 (Twelve) Hours. 3/4/24 4/3/24 Yes Bobby Carter MD   hydroxychloroquine (PLAQUENIL) 200 MG tablet Take 1 tablet by mouth Daily. 11/22/22  Yes Ivet Manzano MD   lacosamide (VIMPAT) 100 MG tablet tablet Take 1 tablet by mouth Every 12 (Twelve) Hours. 12/9/23   Yes Bobby Carter MD   lisinopril (PRINIVIL,ZESTRIL) 20 MG tablet Take 1 tablet by mouth Every Night. 3/4/24 4/3/24 Yes Bobby Carter MD   mycophenolate (CELLCEPT) 500 MG tablet Take 0.5 tablets by mouth Every 12 (Twelve) Hours. 2/18/23  Yes Hernan Corcoran DO   oxyCODONE-acetaminophen (PERCOCET) 5-325 MG per tablet Take 1 tablet by mouth Every 4 (Four) Hours As Needed for Moderate Pain. 3/4/24  Yes Bobby Carter MD   polyethylene glycol (MIRALAX) 17 GM/SCOOP powder Take 17 g by mouth As Needed. 5/23/23  Yes Provider, MD Ivet   folic acid (FOLVITE) 1 MG tablet Take 1 tablet by mouth Daily.  Patient not taking: Reported on 3/28/2024 1/10/24   Margarita Woods APRN   ondansetron (Zofran) 4 MG tablet Take 1 tablet by mouth Every 8 (Eight) Hours As Needed for Nausea or Vomiting. 4/3/23 4/2/24  Jemal Loyola MD   ondansetron ODT (ZOFRAN-ODT) 4 MG disintegrating tablet Place 1 tablet on the tongue Every 8 (Eight) Hours As Needed for Vomiting or Nausea. 2/7/24   Lidia Andre APRN       Allergies:  Allergies   Allergen Reactions    Minoxidil Hives and Other (See Comments)     Pericardial effusion .       Objective     Vitals:   Temp:  [97.8 °F (36.6 °C)-99.3 °F (37.4 °C)] 98.6 °F (37 °C)  Heart Rate:  [62-99] 99  Resp:  [16] 16  BP: (153-246)/() 153/94  No intake or output data in the 24 hours ending 03/29/24 0745    Physical Exam:   Constitutional: Awake, alert, NAD, scratching her arms and abdomen  HEENT: Sclera anicteric, no conjunctival injection  Neck: Supple, no carotid bruit, trachea at midline, no JVD  Respiratory: Clear to auscultation bilaterally, nonlabored on RA  Cardiovascular: RR, tachycardic, no rub   Vascular: Left chest TDC  Gastrointestinal: BS +, soft, tender, not distended chest   : No palpable bladder  Musculoskeletal: No edema, no clubbing or cyanosis  Psychiatric: Appropriate affect, cooperative, oriented  Neurologic: moving all  extremities, normal speech and mental status  Skin: Warm and dry       Scheduled Meds:     aspirin, 81 mg, Oral, Daily  carvedilol, 25 mg, Oral, Q12H  escitalopram, 10 mg, Oral, Daily  folic acid, 1 mg, Oral, Daily  hydrALAZINE, 25 mg, Oral, Q12H  hydroxychloroquine, 200 mg, Oral, Daily  lacosamide, 100 mg, Oral, Q12H  lisinopril, 20 mg, Oral, Nightly  mycophenolate, 250 mg, Oral, Q12H      IV Meds:        Results Reviewed:   I have personally reviewed the results from the time of this admission to 3/29/2024 07:45 EDT     Lab Results   Component Value Date    GLUCOSE 85 03/29/2024    CALCIUM 7.8 (L) 03/29/2024     03/29/2024    K 4.2 03/29/2024    CO2 26.0 03/29/2024     03/29/2024    BUN 19 03/29/2024    CREATININE 4.18 (H) 03/29/2024    EGFRIFAFRI 8 (L) 03/20/2023    BCR 4.5 (L) 03/29/2024    ANIONGAP 9.0 03/29/2024      Lab Results   Component Value Date    MG 2.1 03/09/2024    PHOS 3.8 03/04/2024    ALBUMIN 2.9 (L) 03/28/2024           Assessment / Plan       Pancreatitis    Acute pancreatitis      ASSESSMENT:  1.  ESRD: Stable volume by exam and imaging; electrolytes fine.  Last HD was yesterday.  (TTS) 2.  Hypertensive urgency, due to missed medications, resolving  3.  Chronic pancreatitis on chronic narcoti   4.  SLE on immunosuppression  5.  PVD with ascending aortic aneurysm s/p repair  6.  Anemia of ESRD  7.  Pruritus, likely due to Dilaudid      PLAN:  1.  PRBCs   2.  Benadryl   3.  Resume home BP meds, though increase hydralazine to 25 mg TID  4.  HD tomorrow if still here      Thank you for involving us in the care of Dee Herrera.  Please feel free to call with any questions.    Eduardo Hendricks MD  03/29/24  07:45 EDT    [facility]  [phone]      Please note that portions of this note were completed with a voice recognition program.

## 2024-03-29 NOTE — PROGRESS NOTES
Name: Dee Herrera ADMIT: 3/28/2024   : 1990  PCP: Margarita Woods APRN    MRN: [id number] LOS: 1 days   AGE/SEX: 33 y.o. female  ROOM: [facility]     Subjective   Subjective   She is still having abdominal pain not adequately controlled by prn analgesics including dilaudid. Currently NPO    No overt bleeding. Endoscopy in      Objective   Objective   Vital Signs  Temp:  [97.8 °F (36.6 °C)-99.3 °F (37.4 °C)] 98.2 °F (36.8 °C)  Heart Rate:  [62-99] 81  Resp:  [16-18] 18  BP: (137-246)/() 137/103  SpO2:  [93 %-100 %] 100 %  on   ;   Device (Oxygen Therapy): room air  Body mass index is 19.04 kg/m².  Physical Exam  Vitals reviewed.   Constitutional:       Appearance: She is well-developed. She is ill-appearing (chronic).   HENT:      Head: Normocephalic and atraumatic.      Mouth/Throat:      Mouth: Mucous membranes are moist.   Cardiovascular:      Rate and Rhythm: Normal rate and regular rhythm.   Pulmonary:      Effort: Pulmonary effort is normal. No respiratory distress.      Breath sounds: Normal breath sounds. No wheezing.   Abdominal:      General: Bowel sounds are normal. There is no distension.      Palpations: Abdomen is soft.      Tenderness: There is abdominal tenderness (epigastric).   Skin:     General: Skin is warm and dry.   Neurological:      General: No focal deficit present.      Mental Status: She is alert and oriented to person, place, and time.   Psychiatric:         Mood and Affect: Mood normal.         Behavior: Behavior normal.         Thought Content: Thought content normal.       Results Review     I reviewed the patient's new clinical results.  Results from last 7 days   Lab Units 24  0547 24  1554   WBC 10*3/mm3 3.38* 5.03   HEMOGLOBIN g/dL 6.5* 7.1*   PLATELETS 10*3/mm3 163 186     Results from last 7 days   Lab Units 24  0547 24  1554   SODIUM mmol/L 142 137   POTASSIUM mmol/L 4.2 3.6   CHLORIDE mmol/L 107 100   CO2 mmol/L 26.0 28.3   BUN  "mg/dL 19 12   CREATININE mg/dL 4.18* 3.14*   GLUCOSE mg/dL 85 85   EGFR mL/min/1.73 13.9* 19.6*     Results from last 7 days   Lab Units 03/28/24  1554   ALBUMIN g/dL 2.9*   BILIRUBIN mg/dL 0.3   ALK PHOS U/L 65   AST (SGOT) U/L 16   ALT (SGPT) U/L 9     Results from last 7 days   Lab Units 03/29/24  0547 03/28/24  1554   CALCIUM mg/dL 7.8* 8.5*   ALBUMIN g/dL  --  2.9*       No results found for: \"HGBA1C\", \"POCGLU\"    XR Chest 1 View    Result Date: 3/28/2024  No interval change or convincing evidence for active disease in the chest.  This report was finalized on 3/28/2024 5:17 PM by Dr. Adolph Londono M.D on Workstation: [id number]       I have personally reviewed all medications:  Scheduled Medications  aspirin, 81 mg, Oral, Daily  carvedilol, 25 mg, Oral, Q12H  escitalopram, 10 mg, Oral, Daily  folic acid, 1 mg, Oral, Daily  hydrALAZINE, 25 mg, Oral, TID  hydroxychloroquine, 200 mg, Oral, Daily  lacosamide, 100 mg, Oral, Q12H  lisinopril, 20 mg, Oral, Nightly  mycophenolate, 250 mg, Oral, Q12H    Infusions   Diet  NPO Diet NPO Type: Sips with Meds    I have personally reviewed:  [x]  Laboratory   [x]  Microbiology   [x]  Radiology   [x]  EKG/Telemetry  [x]  Cardiology/Vascular   []  Pathology    []  Records       Assessment/Plan     Active Hospital Problems    Diagnosis  POA    **Pancreatitis [K85.90]  Yes    Thoracic ascending aortic aneurysm [I71.21]  Yes    Acute pancreatitis [K85.90]  Yes    ESRD (end stage renal disease) [N18.6]  Yes    History of CVA (cerebrovascular accident) [Z86.73]  Not Applicable    Severe aortic valve regurgitation [I35.1]  Yes    Chronic diastolic CHF (congestive heart failure) [I50.32]  Yes    Essential hypertension [I10]  Yes    Seizure disorder [G40.909]  Yes    Systemic lupus erythematosus [M32.9]  Yes    Lupus nephritis, 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       33 y.o. female admitted with Pancreatitis.     Acute on Chronic pancreatitis   Acute pancreatitis with pseudocyst  Patient admitted for pancreatitis 3/2024, IgG4 normal.  Lipase elevated, trend dialy. IVF and supportive care. CLD ok. MRCP recommended, timing with HD is needed.     ESRD on HD  Dr. Hendricks following.     Acute on chronic anemia   No overt bleeding. 1 unit PRBC today. Trend daily   Endoscopy performed in 2023. GI following.     SLE on immunosuppression   HTN- antihypertensive agents per renal. Home meds resume with increased hydralazine   PVD With ascending aneurysm s/p repair      SCDs for DVT prophylaxis.  Full code.    CAESAR Ryder  [facility]  03/29/24  09:15 EDT

## 2024-03-29 NOTE — CONSULTS
[facility]  Initial Inpatient Consult Note    Referring Provider: MD Pao    Reason for Consultation: Pancreatitis, recurrent    Subjective     History of present illness:    33 y.o. female with a past medical history of ESRD, SLE, HTN, seizure disorder, chronic pancreatitis, chronic opioid use who presents to [facility] for elevated blood pressure. Patient is known to our service. Patient was waiting to be seen for chronic pancreatitis evaluation in our office with unknown etiology and was found to have a blood pressure of 246/152.  Patient did not take her blood pressure medications that morning.  Patient did report abdominal pain on admission which is chronic for her.  She does report epigastric pain at this time but reports it is improved currently.  Patient was found to have a hemoglobin of 6.5 and is receiving a blood transfusion at this time. Patient denies any overt signs of blood loss.  Patient denies any nausea, vomiting, diarrhea, constipation, melena, hematochezia.  Patient reports that she has been eating well before this.  She has put on some weight.  Patient denies any recent alcohol use.  She has previously had negative bilateral ultrasounds and normal lipid profiles. Patient was most recently admitted on 3/2/2024 for another episode of pancreatitis. Patient was found to have normal IgG4 at that time.     CT abdomen pelvis reviewed from 3/28/2024 with 3 serous fluid collections adjacent to the pancreatic tail. Findings suggestive of pseudocyst formation.Gallbladder is collapsed without evidence of cholelithiasis.     Patient has acute on chronic anemia. She had a EGD and colonoscopy performed on 7/20/2023 with Dr. Kaminski.  EGD with no gross lesions in the esophagus, no esophageal varices, gastritis, normal duodenum.  Colonoscopy with normal-appearing colon.    Past Medical History:  Past Medical History:   Diagnosis Date    Anasarca     PER CT SCAN    Anxiety     CHF (congestive  heart failure)     Dry skin     ESRD (end stage renal disease) on dialysis     MARCO COLE, KATIE FRASER    History of abdominal pain     History of anemia     History of transfusion     Hypertension     Iron deficiency anemia 09/27/2021    Lupus (systemic lupus erythematosus) 07/30/2022    Migraine     Other specified nutritional anemias     Pancreatitis     Pericardial effusion     Renal insufficiency     Scoliosis     Seizures     STATES LAST WAS 1/2023    Shortness of breath     OCCASIONAL    Vitamin D deficiency 09/27/2021     Past Surgical History:  Past Surgical History:   Procedure Laterality Date    ASCENDING AORTIC ANEURYSM REPAIR W/ MECHANICAL AORTIC VALVE REPLACEMENT N/A 11/2/2023    Procedure: CHETNA STERNOTOMY, AORTIC ROOT REPLACEMENT WITH VALVE SPARING MISHEL PROCEDURE, REPLACEMENT OF ASCENDING AORTA, RIGHT FEMORAL DIALYSIS CATHETER PLACEMENT AND PRP;  Surgeon: Chris Medina MD;  Location: [facility] CVOR;  Service: Cardiothoracic;  Laterality: N/A;    CARDIAC CATHETERIZATION N/A 06/14/2023    Procedure: Coronary angiography;  Surgeon: Juana Taylor MD;  Location: [facility] CATH INVASIVE LOCATION;  Service: Cardiovascular;  Laterality: N/A;    CARDIAC CATHETERIZATION N/A 06/14/2023    Procedure: Left heart cath;  Surgeon: Juana Taylor MD;  Location: [facility] CATH INVASIVE LOCATION;  Service: Cardiovascular;  Laterality: N/A;    CARDIAC CATHETERIZATION N/A 06/14/2023    Procedure: Right Heart Cath;  Surgeon: Juana Taylor MD;  Location: [facility] CATH INVASIVE LOCATION;  Service: Cardiovascular;  Laterality: N/A;    COLONOSCOPY N/A 7/20/2023    Procedure: COLONOSCOPY to cecum with biopsy;  Surgeon: rDew Kaminski MD;  Location: [facility] ENDOSCOPY;  Service: Gastroenterology;  Laterality: N/A;  PRE - diarrhea, constipation  POST - fair prep, normal    CORONARY ARTERY BYPASS GRAFT N/A 11/6/2023    Procedure: STERNAL EXPLORATION AND WASH OUT;  Surgeon: Jr Mitesh Quiroz MD;  Location: [facility]  CVOR;  Service: Cardiothoracic;  Laterality: N/A;    ENDOSCOPY N/A 7/20/2023    Procedure: ESOPHAGOGASTRODUODENOSCOPY with biopsy;  Surgeon: Drew Kaminski MD;  Location: [facility] ENDOSCOPY;  Service: Gastroenterology;  Laterality: N/A;  PRE - abn ct abd  POST - gastritis    INSERTION HEMODIALYSIS CATHETER N/A 07/26/2022    Procedure: RIGHT TUNNELED DIALYSIS CATHETER PLACEMENT;  Surgeon: Diandra Adhikari MD;  [facility] MAIN OR;  Service: Vascular;  Laterality: N/A;    INSERTION HEMODIALYSIS CATHETER Left 11/29/2023    Procedure: TUNNELED DIALYSIS CATHETER PLACEMENT;  Surgeon: Jose Patel II, MD;  Location: [facility] MAIN OR;  Service: Vascular;  Laterality: Left;    INSERTION PERITONEAL DIALYSIS CATHETER N/A 04/03/2023    Procedure: INSERTION PERITONEAL DIALYSIS CATHETER LAPAROSCOPIC, omentumpexy;  Surgeon: Jemal Loyola MD;  Location: [facility] MAIN OR;  Service: General;  Laterality: N/A;    REMOVAL PERITONEAL DIALYSIS CATHETER N/A 12/1/2023    Procedure: REMOVAL PERITONEAL DIALYSIS CATHETER;  Surgeon: Maryellen Ashton MD;  Location: [facility] MAIN OR;  Service: General;  Laterality: N/A;    TONSILLECTOMY        Social History:   Social History     Tobacco Use    Smoking status: Never     Passive exposure: Never    Smokeless tobacco: Never    Tobacco comments:     Patient smoked black & mild   Substance Use Topics    Alcohol use: Yes     Alcohol/week: 8.0 standard drinks of alcohol     Types: 2 Glasses of wine, 4 Shots of liquor, 2 Drinks containing 0.5 oz of alcohol per week     Comment: social      Family History:  Family History   Problem Relation Age of Onset    Autoimmune disease Mother     Anemia Mother     Diabetes Sister     Anemia Brother     Diabetes Maternal Grandmother     Hypertension Maternal Grandmother     Cancer Maternal Grandmother     Sickle cell anemia Cousin     Malig Hyperthermia Neg Hx        Home Meds:  Medications Prior to Admission   Medication Sig Dispense Refill Last  Dose    aspirin 81 MG chewable tablet Chew 1 tablet Daily.   3/27/2024 at 1800    carvedilol (COREG) 25 MG tablet Take 1 tablet by mouth Every 12 (Twelve) Hours. 60 tablet 0 3/27/2024 at 1800    Diclofenac Sodium (VOLTAREN) 1 % gel gel Apply 4 g topically to the appropriate area as directed 3 (Three) Times a Day. 150 g 0 3/27/2024 at 1800    escitalopram (LEXAPRO) 10 MG tablet Take 1 tablet by mouth Daily.   3/27/2024 at 1800    hydrALAZINE (APRESOLINE) 25 MG tablet Take 1 tablet by mouth Every 12 (Twelve) Hours. 60 tablet 0 3/27/2024 at 1800    hydroxychloroquine (PLAQUENIL) 200 MG tablet Take 1 tablet by mouth Daily.   3/27/2024 at 1600    lacosamide (VIMPAT) 100 MG tablet tablet Take 1 tablet by mouth Every 12 (Twelve) Hours. 6 tablet 0 3/27/2024 at 1600    lisinopril (PRINIVIL,ZESTRIL) 20 MG tablet Take 1 tablet by mouth Every Night. 30 tablet 0 3/27/2024 at 1600    mycophenolate (CELLCEPT) 500 MG tablet Take 0.5 tablets by mouth Every 12 (Twelve) Hours. 30 tablet 0 3/27/2024 at 1600    oxyCODONE-acetaminophen (PERCOCET) 5-325 MG per tablet Take 1 tablet by mouth Every 4 (Four) Hours As Needed for Moderate Pain. 12 tablet 0 3/27/2024 at 1800    polyethylene glycol (MIRALAX) 17 GM/SCOOP powder Take 17 g by mouth As Needed.   Past Month    folic acid (FOLVITE) 1 MG tablet Take 1 tablet by mouth Daily. (Patient not taking: Reported on 3/28/2024) 90 tablet 1 Not Taking    ondansetron (Zofran) 4 MG tablet Take 1 tablet by mouth Every 8 (Eight) Hours As Needed for Nausea or Vomiting. 30 tablet 1 More than a month    ondansetron ODT (ZOFRAN-ODT) 4 MG disintegrating tablet Place 1 tablet on the tongue Every 8 (Eight) Hours As Needed for Vomiting or Nausea. 30 tablet 0 More than a month     Current Meds:   aspirin, 81 mg, Oral, Daily  carvedilol, 25 mg, Oral, Q12H  escitalopram, 10 mg, Oral, Daily  folic acid, 1 mg, Oral, Daily  hydrALAZINE, 25 mg, Oral, TID  hydroxychloroquine, 200 mg, Oral, Daily  lacosamide, 100 mg,  Oral, Q12H  lisinopril, 20 mg, Oral, Nightly  mycophenolate, 250 mg, Oral, Q12H      Allergies:  Allergies   Allergen Reactions    Minoxidil Hives and Other (See Comments)     Pericardial effusion .       Objective     Vital Signs  Temp:  [97.8 °F (36.6 °C)-99.3 °F (37.4 °C)] 98.2 °F (36.8 °C)  Heart Rate:  [62-99] 73  Resp:  [16-18] 18  BP: (132-246)/() 153/112  Physical Exam:  General Appearance:     Alert, cooperative, in no acute distress   Abdomen:     Normal bowel sounds, no masses, no organomegaly, soft     epigastric tenderness, nondistended, no guarding, no rebound tenderness   Rectal:     Deferred       Results Review:   I reviewed the patient's new clinical results.  I reviewed the patient's new imaging results and agree with the interpretation.    Results from last 7 days   Lab Units 03/29/24  0547 03/28/24  1554   WBC 10*3/mm3 3.38* 5.03   HEMOGLOBIN g/dL 6.5* 7.1*   HEMATOCRIT % 21.8* 23.5*   PLATELETS 10*3/mm3 163 186     Results from last 7 days   Lab Units 03/29/24  0547 03/28/24  1554   SODIUM mmol/L 142 137   POTASSIUM mmol/L 4.2 3.6   CHLORIDE mmol/L 107 100   CO2 mmol/L 26.0 28.3   BUN mg/dL 19 12   CREATININE mg/dL 4.18* 3.14*   CALCIUM mg/dL 7.8* 8.5*   BILIRUBIN mg/dL  --  0.3   ALK PHOS U/L  --  65   ALT (SGPT) U/L  --  9   AST (SGOT) U/L  --  16   GLUCOSE mg/dL 85 85         Lab Results   Lab Value Date/Time    LIPASE 529 (H) 03/29/2024 0547    LIPASE 628 (H) 03/28/2024 1554    LIPASE 205 (H) 03/09/2024 0809    LIPASE 161 (H) 03/02/2024 1138    LIPASE 141 (H) 03/01/2024 1137    LIPASE 389 (H) 02/07/2024 1259    LIPASE 416 (H) 01/29/2024 0413    LIPASE 194 (H) 01/28/2024 0408    LIPASE 189 (H) 01/27/2024 0018    LIPASE 295 (H) 01/26/2024 1654    LIPASE 42 10/26/2023 0054    LIPASE 10 (L) 09/10/2023 1901    LIPASE 22 02/15/2023 0343    LIPASE 22 02/14/2023 2355    LIPASE 47 11/25/2022 2109    LIPASE 27 11/22/2022 1059    LIPASE 32 11/21/2022 2030       Radiology:  CT Abdomen Pelvis  Without Contrast   Final Result      XR Chest 1 View   Final Result   No interval change or convincing evidence for active disease   in the chest.       This report was finalized on 3/28/2024 5:17 PM by Dr. Adolph Londono M.D on Workstation: [id number]          MRI abdomen w wo contrast mrcp    (Results Pending)       Assessment & Plan   Assessment:   Recurrent pancreatitis with pseudocyst  ESRD on HD on TRS  SLE on immunosuppression  Peripheral vascular disease  Acute on chronic anemia - no overt GI bleeding     Plan:   CT abdomen pelvis without contrast with findings of various pancreatic fluid collections surrounding the pancreas consistent with pseudocyst formation.  Acute pancreatitis cannot be excluded. Lipase on arrival of 628 and concomitant abdominal pain consistent with pancreatitis.  Continue supportive care with IV fluids, as needed antiemetics and analgesia  Okay for clear liquid diet  CT and US imaging with no evidence of cholelithiasis, normal IgG 4, glycerides, calcium. She denies recent alcohol use. Recommend MRCP for further evaluation of recurrent pancreatitis.      I discussed the patients findings and my recommendations with patient.     Dictated utilizing Dragon dictation.          Kari Wright PA-C   [facility]  [address] [place] [address] [address] [place] [address]  Office: [phone]

## 2024-03-29 NOTE — PROGRESS NOTES
[facility]  Initial Inpatient Consult Note    Referring Provider: MD Pao     Reason for Consultation: pancreatitis     Subjective     History of present illness:    33 y.o. female with a past medical history of ESRD, SLE, HTN, seizure disorder, chronic pancreatitis, chronic opioid use who presents to [facility] for elevated blood pressure.  Patient is known to our service. Patient was waiting to be seen for chronic pancreatitis evaluation in our office with unknown etiology and was found to have a blood pressure of 246/152.  Patient did not take her blood pressure medications that morning.  Patient did report abdominal pain on admission which is chronic for her.  She does report epigastric pain at this time but reports it is improved currently.  Patient was found to have a hemoglobin of 6.5 and is receiving a blood transfusion at this time. Patient denies any overt signs of blood loss.  Patient denies any nausea, vomiting, diarrhea, constipation, melena, hematochezia.  Patient reports that she has been eating well before this.  She has put on some weight.  Patient denies any recent alcohol use.  She has previously had negative bilateral ultrasounds and normal lipid profiles. Patient was most recently admitted on 3/2/2024 for another episode of pancreatitis. Patient was found to have normal IgG4 at that time.    CT abdomen pelvis reviewed from 3/28/2024 with 3 serous fluid collections adjacent to the pancreatic tail. Findings suggestive of pseudocyst formation.Gallbladder is collapsed without evidence of cholelithiasis.    Patient has acute on chronic anemia. She had a EGD and colonoscopy performed on 7/20/2023 with Dr. Kaminski.  EGD with no gross lesions in the esophagus, no esophageal varices, gastritis, normal duodenum.  Colonoscopy with normal-appearing colon.    Past Medical History:  Past Medical History:   Diagnosis Date    Anasarca     PER CT SCAN    Anxiety     CHF (congestive heart  failure)     Dry skin     ESRD (end stage renal disease) on dialysis     MARCO COLE, KATIE CHAVIRA HWDRAKE    History of abdominal pain     History of anemia     History of transfusion     Hypertension     Iron deficiency anemia 09/27/2021    Lupus (systemic lupus erythematosus) 07/30/2022    Migraine     Other specified nutritional anemias     Pancreatitis     Pericardial effusion     Renal insufficiency     Scoliosis     Seizures     STATES LAST WAS 1/2023    Shortness of breath     OCCASIONAL    Vitamin D deficiency 09/27/2021     Past Surgical History:  Past Surgical History:   Procedure Laterality Date    ASCENDING AORTIC ANEURYSM REPAIR W/ MECHANICAL AORTIC VALVE REPLACEMENT N/A 11/2/2023    Procedure: CHETNA STERNOTOMY, AORTIC ROOT REPLACEMENT WITH VALVE SPARING MISHEL PROCEDURE, REPLACEMENT OF ASCENDING AORTA, RIGHT FEMORAL DIALYSIS CATHETER PLACEMENT AND PRP;  Surgeon: Chris Medina MD;  Location: [facility];  Service: Cardiothoracic;  Laterality: N/A;    CARDIAC CATHETERIZATION N/A 06/14/2023    Procedure: Coronary angiography;  Surgeon: Juana Taylor MD;  Location: [facility] CATH INVASIVE LOCATION;  Service: Cardiovascular;  Laterality: N/A;    CARDIAC CATHETERIZATION N/A 06/14/2023    Procedure: Left heart cath;  Surgeon: Juana Taylor MD;  Location: [facility] CATH INVASIVE LOCATION;  Service: Cardiovascular;  Laterality: N/A;    CARDIAC CATHETERIZATION N/A 06/14/2023    Procedure: Right Heart Cath;  Surgeon: Juana Taylor MD;  Location: [facility] CATH INVASIVE LOCATION;  Service: Cardiovascular;  Laterality: N/A;    COLONOSCOPY N/A 7/20/2023    Procedure: COLONOSCOPY to cecum with biopsy;  Surgeon: Drew Kaminski MD;  Location: [facility] ENDOSCOPY;  Service: Gastroenterology;  Laterality: N/A;  PRE - diarrhea, constipation  POST - fair prep, normal    CORONARY ARTERY BYPASS GRAFT N/A 11/6/2023    Procedure: STERNAL EXPLORATION AND WASH OUT;  Surgeon: Jr Mitesh Quiroz MD;  Location: [facility] CVOR;   Service: Cardiothoracic;  Laterality: N/A;    ENDOSCOPY N/A 7/20/2023    Procedure: ESOPHAGOGASTRODUODENOSCOPY with biopsy;  Surgeon: Drew Kaminski MD;  Location: [facility] ENDOSCOPY;  Service: Gastroenterology;  Laterality: N/A;  PRE - abn ct abd  POST - gastritis    INSERTION HEMODIALYSIS CATHETER N/A 07/26/2022    Procedure: RIGHT TUNNELED DIALYSIS CATHETER PLACEMENT;  Surgeon: Diandra Adhikari MD;  Location: [facility] MAIN OR;  Service: Vascular;  Laterality: N/A;    INSERTION HEMODIALYSIS CATHETER Left 11/29/2023    Procedure: TUNNELED DIALYSIS CATHETER PLACEMENT;  Surgeon: Jose Patel II, MD;  Location: [facility] MAIN OR;  Service: Vascular;  Laterality: Left;    INSERTION PERITONEAL DIALYSIS CATHETER N/A 04/03/2023    Procedure: INSERTION PERITONEAL DIALYSIS CATHETER LAPAROSCOPIC, omentumpexy;  Surgeon: Jemal Loyola MD;  Location: [facility] MAIN OR;  Service: General;  Laterality: N/A;    REMOVAL PERITONEAL DIALYSIS CATHETER N/A 12/1/2023    Procedure: REMOVAL PERITONEAL DIALYSIS CATHETER;  Surgeon: Maryellen Ashton MD;  Location: [facility] MAIN OR;  Service: General;  Laterality: N/A;    TONSILLECTOMY        Social History:   Social History     Tobacco Use    Smoking status: Never     Passive exposure: Never    Smokeless tobacco: Never    Tobacco comments:     Patient smoked black & mild   Substance Use Topics    Alcohol use: Yes     Alcohol/week: 8.0 standard drinks of alcohol     Types: 2 Glasses of wine, 4 Shots of liquor, 2 Drinks containing 0.5 oz of alcohol per week     Comment: social      Family History:  Family History   Problem Relation Age of Onset    Autoimmune disease Mother     Anemia Mother     Diabetes Sister     Anemia Brother     Diabetes Maternal Grandmother     Hypertension Maternal Grandmother     Cancer Maternal Grandmother     Sickle cell anemia Cousin     Malig Hyperthermia Neg Hx        Home Meds:  Medications Prior to Admission   Medication Sig Dispense Refill Last Dose     aspirin 81 MG chewable tablet Chew 1 tablet Daily.   3/27/2024 at 1800    carvedilol (COREG) 25 MG tablet Take 1 tablet by mouth Every 12 (Twelve) Hours. 60 tablet 0 3/27/2024 at 1800    Diclofenac Sodium (VOLTAREN) 1 % gel gel Apply 4 g topically to the appropriate area as directed 3 (Three) Times a Day. 150 g 0 3/27/2024 at 1800    escitalopram (LEXAPRO) 10 MG tablet Take 1 tablet by mouth Daily.   3/27/2024 at 1800    hydrALAZINE (APRESOLINE) 25 MG tablet Take 1 tablet by mouth Every 12 (Twelve) Hours. 60 tablet 0 3/27/2024 at 1800    hydroxychloroquine (PLAQUENIL) 200 MG tablet Take 1 tablet by mouth Daily.   3/27/2024 at 1600    lacosamide (VIMPAT) 100 MG tablet tablet Take 1 tablet by mouth Every 12 (Twelve) Hours. 6 tablet 0 3/27/2024 at 1600    lisinopril (PRINIVIL,ZESTRIL) 20 MG tablet Take 1 tablet by mouth Every Night. 30 tablet 0 3/27/2024 at 1600    mycophenolate (CELLCEPT) 500 MG tablet Take 0.5 tablets by mouth Every 12 (Twelve) Hours. 30 tablet 0 3/27/2024 at 1600    oxyCODONE-acetaminophen (PERCOCET) 5-325 MG per tablet Take 1 tablet by mouth Every 4 (Four) Hours As Needed for Moderate Pain. 12 tablet 0 3/27/2024 at 1800    polyethylene glycol (MIRALAX) 17 GM/SCOOP powder Take 17 g by mouth As Needed.   Past Month    folic acid (FOLVITE) 1 MG tablet Take 1 tablet by mouth Daily. (Patient not taking: Reported on 3/28/2024) 90 tablet 1 Not Taking    ondansetron (Zofran) 4 MG tablet Take 1 tablet by mouth Every 8 (Eight) Hours As Needed for Nausea or Vomiting. 30 tablet 1 More than a month    ondansetron ODT (ZOFRAN-ODT) 4 MG disintegrating tablet Place 1 tablet on the tongue Every 8 (Eight) Hours As Needed for Vomiting or Nausea. 30 tablet 0 More than a month     Current Meds:   aspirin, 81 mg, Oral, Daily  carvedilol, 25 mg, Oral, Q12H  escitalopram, 10 mg, Oral, Daily  folic acid, 1 mg, Oral, Daily  hydrALAZINE, 25 mg, Oral, TID  hydroxychloroquine, 200 mg, Oral, Daily  lacosamide, 100 mg, Oral,  Q12H  lisinopril, 20 mg, Oral, Nightly  mycophenolate, 250 mg, Oral, Q12H      Allergies:  Allergies   Allergen Reactions    Minoxidil Hives and Other (See Comments)     Pericardial effusion .       Objective     Vital Signs  Temp:  [97.8 °F (36.6 °C)-99.3 °F (37.4 °C)] 98.2 °F (36.8 °C)  Heart Rate:  [62-99] 81  Resp:  [16-18] 18  BP: (137-246)/() 137/103  Physical Exam:  General Appearance:     Alert, cooperative, in no acute distress   Abdomen:     Normal bowel sounds, no masses, no organomegaly, soft     epigastric tenderness, nondistended, no guarding, no rebound tenderness   Rectal:     Deferred       Results Review:   I reviewed the patient's new clinical results.  I reviewed the patient's new imaging results and agree with the interpretation.    Results from last 7 days   Lab Units 03/29/24  0547 03/28/24  1554   WBC 10*3/mm3 3.38* 5.03   HEMOGLOBIN g/dL 6.5* 7.1*   HEMATOCRIT % 21.8* 23.5*   PLATELETS 10*3/mm3 163 186     Results from last 7 days   Lab Units 03/29/24  0547 03/28/24  1554   SODIUM mmol/L 142 137   POTASSIUM mmol/L 4.2 3.6   CHLORIDE mmol/L 107 100   CO2 mmol/L 26.0 28.3   BUN mg/dL 19 12   CREATININE mg/dL 4.18* 3.14*   CALCIUM mg/dL 7.8* 8.5*   BILIRUBIN mg/dL  --  0.3   ALK PHOS U/L  --  65   ALT (SGPT) U/L  --  9   AST (SGOT) U/L  --  16   GLUCOSE mg/dL 85 85         Lab Results   Lab Value Date/Time    LIPASE 628 (H) 03/28/2024 1554    LIPASE 205 (H) 03/09/2024 0809    LIPASE 161 (H) 03/02/2024 1138    LIPASE 141 (H) 03/01/2024 1137    LIPASE 389 (H) 02/07/2024 1259    LIPASE 416 (H) 01/29/2024 0413    LIPASE 194 (H) 01/28/2024 0408    LIPASE 189 (H) 01/27/2024 0018    LIPASE 295 (H) 01/26/2024 1654    LIPASE 42 10/26/2023 0054    LIPASE 10 (L) 09/10/2023 1901    LIPASE 22 02/15/2023 0343    LIPASE 22 02/14/2023 2355    LIPASE 47 11/25/2022 2109    LIPASE 27 11/22/2022 1059    LIPASE 32 11/21/2022 2030       Radiology:  CT Abdomen Pelvis Without Contrast   Final Result      XR  Chest 1 View   Final Result   No interval change or convincing evidence for active disease   in the chest.       This report was finalized on 3/28/2024 5:17 PM by Dr. Adolph Londono M.D on Workstation: [id number]              Assessment & Plan   Assessment:   Recurrent pancreatitis with pseudocyst  ESRD on HD on TRS  SLE on immunosuppression  Peripheral vascular disease  Acute on chronic anemia - no overt GI bleeding    Plan:   CT abdomen pelvis without contrast with findings of various pancreatic fluid collections surrounding the pancreas consistent with pseudocyst formation.  Acute pancreatitis cannot be excluded. Lipase on arrival of 628 and concomitant abdominal pain consistent with pancreatitis.  Continue supportive care with IV fluids, as needed antiemetics and analgesia  Okay for clear liquid diet  CT and US imaging with no evidence of cholelithiasis, normal IgG 4, glycerides, calcium. She denies recent alcohol use. Recommend MRCP for further evaluation of recurrent pancreatitis.     I discussed the patients findings and my recommendations with patient.    Dictated utilizing Dragon dictation.         Kari Wright PA-C   [facility]  [address] [place] [address]  Office: [phone]

## 2024-03-29 NOTE — CASE MANAGEMENT/SOCIAL WORK
Discharge Planning Assessment  [facility]     Patient Name: Dee Herrera  MRN: [id number]  Today's Date: 3/29/2024    Admit Date: 3/28/2024    Plan: Home with family   Discharge Needs Assessment       Row Name 03/29/24 1442       Living Environment    People in Home sibling(s);parent(s)    Current Living Arrangements apartment    Potentially Unsafe Housing Conditions none    Primary Care Provided by self    Family Caregiver if Needed parent(s)    Quality of Family Relationships involved;helpful    Able to Return to Prior Arrangements yes       Resource/Environmental Concerns    Resource/Environmental Concerns home accessibility    Home Accessibility Concerns stairs to enter home  10       Transition Planning    Patient/Family Anticipates Transition to home with family    Patient/Family Anticipated Services at Transition none    Transportation Anticipated family or friend will provide       Discharge Needs Assessment    Equipment Currently Used at Home none    Concerns to be Addressed no discharge needs identified    Anticipated Changes Related to Illness none    Equipment Needed After Discharge none                   Discharge Plan       Row Name 03/29/24 1444       Plan    Plan Home with family    Patient/Family in Agreement with Plan yes    Plan Comments Spoke to pt at bedside, introduced self and explained CCP role, face sheet and pharmacy information verified. Pt lives with mother and brothers in apt with [address], she is IADL's, and uses no medical equipment, has no HH or SNF history. She plans home with a friend Caity to transport, no anticipated needs. CCP will follow Etta RODRIGUEZ                  Continued Care and Services - Admitted Since 3/28/2024    No active coordination exists for this encounter.       Expected Discharge Date and Time       Expected Discharge Date Expected Discharge Time    Apr 1, 2024            Demographic Summary       Row Name 03/29/24 1442       General Information    Admission  Type inpatient                   Functional Status       Row Name 03/29/24 1442       Functional Status    Usual Activity Tolerance excellent    Current Activity Tolerance good       Assessment of Health Literacy    Health Literacy Excellent       Functional Status, IADL    Medications independent    Meal Preparation independent    Housekeeping independent    Laundry independent    Shopping independent       Mental Status    General Appearance WDL WDL       Mental Status Summary    Recent Changes in Mental Status/Cognitive Functioning no changes                   Psychosocial    No documentation.                  Abuse/Neglect    No documentation.                  Legal       Row Name 03/29/24 1442       Financial/Legal    Who Manages Finances if Patient Unable mother                   Substance Abuse    No documentation.                  Patient Forms    No documentation.                     Etta Leon RN

## 2024-03-30 ENCOUNTER — APPOINTMENT (OUTPATIENT)
Dept: MRI IMAGING | Facility: HOSPITAL | Age: 34
DRG: 438 | End: 2024-03-30
Payer: MEDICARE

## 2024-03-30 LAB
ALBUMIN SERPL-MCNC: 2.3 G/DL (ref 3.5–5.2)
ALBUMIN SERPL-MCNC: 2.4 G/DL (ref 3.5–5.2)
ALBUMIN/GLOB SERPL: 0.7 G/DL
ALP SERPL-CCNC: 46 U/L (ref 39–117)
ALT SERPL W P-5'-P-CCNC: 6 U/L (ref 1–33)
ANION GAP SERPL CALCULATED.3IONS-SCNC: 10.5 MMOL/L (ref 5–15)
ANION GAP SERPL CALCULATED.3IONS-SCNC: 8.9 MMOL/L (ref 5–15)
AST SERPL-CCNC: 11 U/L (ref 1–32)
BH BB BLOOD EXPIRATION DATE: NORMAL
BH BB BLOOD TYPE BARCODE: 5100
BH BB DISPENSE STATUS: NORMAL
BH BB PRODUCT CODE: NORMAL
BH BB UNIT NUMBER: NORMAL
BILIRUB SERPL-MCNC: 0.3 MG/DL (ref 0–1.2)
BUN SERPL-MCNC: 24 MG/DL (ref 6–20)
BUN SERPL-MCNC: 26 MG/DL (ref 6–20)
BUN/CREAT SERPL: 4.2 (ref 7–25)
BUN/CREAT SERPL: 5 (ref 7–25)
CALCIUM SPEC-SCNC: 8.3 MG/DL (ref 8.6–10.5)
CALCIUM SPEC-SCNC: 8.5 MG/DL (ref 8.6–10.5)
CHLORIDE SERPL-SCNC: 100 MMOL/L (ref 98–107)
CHLORIDE SERPL-SCNC: 102 MMOL/L (ref 98–107)
CO2 SERPL-SCNC: 23.1 MMOL/L (ref 22–29)
CO2 SERPL-SCNC: 23.5 MMOL/L (ref 22–29)
CREAT SERPL-MCNC: 5.25 MG/DL (ref 0.57–1)
CREAT SERPL-MCNC: 5.75 MG/DL (ref 0.57–1)
CROSSMATCH INTERPRETATION: NORMAL
DEPRECATED RDW RBC AUTO: 49.4 FL (ref 37–54)
EGFRCR SERPLBLD CKD-EPI 2021: 10.6 ML/MIN/1.73
EGFRCR SERPLBLD CKD-EPI 2021: 9.5 ML/MIN/1.73
ERYTHROCYTE [DISTWIDTH] IN BLOOD BY AUTOMATED COUNT: 17.6 % (ref 12.3–15.4)
GLOBULIN UR ELPH-MCNC: 3.2 GM/DL
GLUCOSE SERPL-MCNC: 77 MG/DL (ref 65–99)
GLUCOSE SERPL-MCNC: 77 MG/DL (ref 65–99)
HCT VFR BLD AUTO: 26.6 % (ref 34–46.6)
HGB BLD-MCNC: 8.2 G/DL (ref 12–15.9)
LIPASE SERPL-CCNC: 190 U/L (ref 13–60)
LIPASE SERPL-CCNC: 219 U/L (ref 13–60)
MCH RBC QN AUTO: 24.2 PG (ref 26.6–33)
MCHC RBC AUTO-ENTMCNC: 30.8 G/DL (ref 31.5–35.7)
MCV RBC AUTO: 78.5 FL (ref 79–97)
PHOSPHATE SERPL-MCNC: 2.8 MG/DL (ref 2.5–4.5)
PLATELET # BLD AUTO: 155 10*3/MM3 (ref 140–450)
PMV BLD AUTO: 9.4 FL (ref 6–12)
POTASSIUM SERPL-SCNC: 4.5 MMOL/L (ref 3.5–5.2)
POTASSIUM SERPL-SCNC: 4.5 MMOL/L (ref 3.5–5.2)
PROT SERPL-MCNC: 5.5 G/DL (ref 6–8.5)
RBC # BLD AUTO: 3.39 10*6/MM3 (ref 3.77–5.28)
SODIUM SERPL-SCNC: 134 MMOL/L (ref 136–145)
SODIUM SERPL-SCNC: 134 MMOL/L (ref 136–145)
UNIT  ABO: NORMAL
UNIT  RH: NORMAL
WBC NRBC COR # BLD AUTO: 3.89 10*3/MM3 (ref 3.4–10.8)

## 2024-03-30 PROCEDURE — A9585 GADOBUTROL INJECTION: HCPCS | Performed by: HOSPITALIST

## 2024-03-30 PROCEDURE — 84100 ASSAY OF PHOSPHORUS: CPT | Performed by: INTERNAL MEDICINE

## 2024-03-30 PROCEDURE — 74183 MRI ABD W/O CNTR FLWD CNTR: CPT

## 2024-03-30 PROCEDURE — 25010000002 HEPARIN (PORCINE) PER 1000 UNITS: Performed by: INTERNAL MEDICINE

## 2024-03-30 PROCEDURE — 63710000001 MYCOPHENOLATE MOFETIL PER 250 MG: Performed by: INTERNAL MEDICINE

## 2024-03-30 PROCEDURE — 25010000002 HYDROMORPHONE PER 4 MG: Performed by: INTERNAL MEDICINE

## 2024-03-30 PROCEDURE — 99232 SBSQ HOSP IP/OBS MODERATE 35: CPT | Performed by: INTERNAL MEDICINE

## 2024-03-30 PROCEDURE — 80053 COMPREHEN METABOLIC PANEL: CPT | Performed by: INTERNAL MEDICINE

## 2024-03-30 PROCEDURE — 83690 ASSAY OF LIPASE: CPT | Performed by: NURSE PRACTITIONER

## 2024-03-30 PROCEDURE — 0 GADOBUTROL 1 MMOL/ML SOLUTION: Performed by: HOSPITALIST

## 2024-03-30 PROCEDURE — 83690 ASSAY OF LIPASE: CPT | Performed by: HOSPITALIST

## 2024-03-30 PROCEDURE — 85027 COMPLETE CBC AUTOMATED: CPT | Performed by: INTERNAL MEDICINE

## 2024-03-30 RX ORDER — GADOBUTROL 604.72 MG/ML
5 INJECTION INTRAVENOUS
Status: COMPLETED | OUTPATIENT
Start: 2024-03-30 | End: 2024-03-30

## 2024-03-30 RX ORDER — HEPARIN SODIUM 1000 [USP'U]/ML
3800 INJECTION, SOLUTION INTRAVENOUS; SUBCUTANEOUS AS NEEDED
Status: DISCONTINUED | OUTPATIENT
Start: 2024-03-30 | End: 2024-03-31 | Stop reason: HOSPADM

## 2024-03-30 RX ORDER — LISINOPRIL 40 MG/1
40 TABLET ORAL NIGHTLY
Status: DISCONTINUED | OUTPATIENT
Start: 2024-03-30 | End: 2024-03-31 | Stop reason: HOSPADM

## 2024-03-30 RX ADMIN — CARVEDILOL 25 MG: 25 TABLET, FILM COATED ORAL at 15:37

## 2024-03-30 RX ADMIN — MYCOPHENOLATE MOFETIL 250 MG: 250 CAPSULE ORAL at 21:10

## 2024-03-30 RX ADMIN — OXYCODONE AND ACETAMINOPHEN 1 TABLET: 5; 325 TABLET ORAL at 21:10

## 2024-03-30 RX ADMIN — ESCITALOPRAM OXALATE 10 MG: 10 TABLET, FILM COATED ORAL at 09:52

## 2024-03-30 RX ADMIN — HYDRALAZINE HYDROCHLORIDE 25 MG: 25 TABLET ORAL at 15:37

## 2024-03-30 RX ADMIN — HYDRALAZINE HYDROCHLORIDE 25 MG: 25 TABLET ORAL at 21:10

## 2024-03-30 RX ADMIN — FOLIC ACID 1 MG: 1 TABLET ORAL at 09:52

## 2024-03-30 RX ADMIN — OXYCODONE AND ACETAMINOPHEN 1 TABLET: 5; 325 TABLET ORAL at 02:14

## 2024-03-30 RX ADMIN — CARVEDILOL 25 MG: 25 TABLET, FILM COATED ORAL at 21:10

## 2024-03-30 RX ADMIN — MYCOPHENOLATE MOFETIL 250 MG: 250 CAPSULE ORAL at 09:52

## 2024-03-30 RX ADMIN — GADOBUTROL 5 ML: 604.72 INJECTION INTRAVENOUS at 09:16

## 2024-03-30 RX ADMIN — HEPARIN SODIUM 3800 UNITS: 1000 INJECTION INTRAVENOUS; SUBCUTANEOUS at 15:38

## 2024-03-30 RX ADMIN — HYDROMORPHONE HYDROCHLORIDE 0.5 MG: 1 INJECTION, SOLUTION INTRAMUSCULAR; INTRAVENOUS; SUBCUTANEOUS at 09:52

## 2024-03-30 RX ADMIN — ASPIRIN 81 MG: 81 TABLET, CHEWABLE ORAL at 09:53

## 2024-03-30 RX ADMIN — LACOSAMIDE 100 MG: 100 TABLET, FILM COATED ORAL at 21:10

## 2024-03-30 RX ADMIN — HYDROXYCHLOROQUINE SULFATE 200 MG: 200 TABLET, FILM COATED ORAL at 09:53

## 2024-03-30 RX ADMIN — LISINOPRIL 40 MG: 40 TABLET ORAL at 21:10

## 2024-03-30 RX ADMIN — LACOSAMIDE 100 MG: 100 TABLET, FILM COATED ORAL at 09:53

## 2024-03-30 NOTE — PROGRESS NOTES
"    DAILY PROGRESS NOTE  [facility]    Patient Identification:  Name: Dee Herrera  Age: 33 y.o.  Sex: female  :  1990  MRN: [id number]         Primary Care Physician: Margarita Woods APRN    Subjective:  Interval History: Feeling much better.  She not really complaining much in regards to abdominal pain as tolerated clears denies any emesis.  Denies any confusion fever vomiting.  Curious in regards to disposition    Objective: Transport at bedside though no family present.  Patient chronically ill in appearance but nontoxic    Scheduled Meds:aspirin, 81 mg, Oral, Daily  carvedilol, 25 mg, Oral, Q12H  escitalopram, 10 mg, Oral, Daily  folic acid, 1 mg, Oral, Daily  hydrALAZINE, 25 mg, Oral, TID  hydroxychloroquine, 200 mg, Oral, Daily  lacosamide, 100 mg, Oral, Q12H  lisinopril, 40 mg, Oral, Nightly  mycophenolate, 250 mg, Oral, Q12H      Continuous Infusions:     Vital signs in last 24 hours:  Temp:  [98.2 °F (36.8 °C)-99.3 °F (37.4 °C)] 98.2 °F (36.8 °C)  Heart Rate:  [64-86] 73  Resp:  [17-19] 17  BP: (137-180)/() 149/101    Intake/Output:    Intake/Output Summary (Last 24 hours) at 3/30/2024 1011  Last data filed at 3/29/2024 1422  Gross per 24 hour   Intake 379 ml   Output --   Net 379 ml       Exam:  BP (!) 149/101 (BP Location: Right arm, Patient Position: Lying)   Pulse 73   Temp 98.2 °F (36.8 °C) (Oral)   Resp 17   Ht 167.6 cm (66\")   Wt 53.5 kg (117 lb 15.1 oz)   SpO2 98%   BMI 19.04 kg/m²     General Appearance:    Alert, cooperative, AAOx3, nonjaundiced                          Head:    Normocephalic, without obvious abnormality, atraumatic                           Eyes:  No scleral icterus                         Throat:   Oral mucosa pink and moist                           Neck:   No JVD                         Lungs:    Clear to auscultation bilaterally, respirations unlabored                 Chest Wall:  None bandaged or covered tunnel catheter to left " upper chest wall                          heart:    Regular rate and rhythm, S1 and S2 normal                  Abdomen:     Soft, bowel sounds noted, mild distention around epigastrium and some fullness but I was surprised that she was not really tender to palpation and certainly no rebound or guarding                 Extremities: Moving all without any signs of volume overload or pitting edema                  Neurologic:   CNII-XII intact       Data Review:  Labs in chart were reviewed.    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       Plan:    Chronic/recurrent pancreatitis with developing pseudocyst with GI check an MRCP   -Abdomen pretty remarkable on my exam   -Normal LFTs/bilirubin.  Lipase nonspecific      ESRD with HD per renal    Acute on chronic anemia issue status posttransfusion 1 unit packed red blood cells.   -Endoscopy noted 2023    Immunosuppression/SLE on CellCept/Plaquenil    HTN -defer any additional adjustment of meds to renal since HTN renally driven given ESRD   -Lisinopril 40, hydralazine 25 3 times daily, Coreg 25 twice daily    PVD with AAA with previous repair and no dissection    Pruritus secondary to Dilaudid now discontinued with transition to p.o. since current diet is clears   -Defer liberalization of  diet to GI        Disposition -patient seemingly proactive for discharge.  Awaiting MRCP as well as dietary advancement per GI and any final recommendations.  Also monitoring BP trends with further HD with plans to remove 3 L as tolerated as well as an increase in ACE noted    Radhames Davis MD  3/30/2024  10:11 EDT

## 2024-03-30 NOTE — NURSING NOTE
HD WITHOUT INCIDENT OR C/O. TOLERATED WELL. REMOVED 2.4 L. NO SIGNIFICANT CHANGE IN BP WITH FLUID REMOVAL. REQUESTED BP MEDS TO BE GIVEN. DRSG CHANGED, CDI. STABLE, NO C/O POST COMPLETION OF HD.

## 2024-03-30 NOTE — PROGRESS NOTES
[facility]  Inpatient Progress Note    Reason for Follow Up:  Pancreatitis, recurrent     Subjective     Interval History:   She feels better.  She is tolerating clear liquids without abdominal pain or nausea.  Currently on dialysis.  Wants to eat and wants to go home.    Current Facility-Administered Medications:     acetaminophen (TYLENOL) tablet 650 mg, 650 mg, Oral, Q4H PRN, Laurence Cedeno MD    aspirin chewable tablet 81 mg, 81 mg, Oral, Daily, Laurence Cedeno MD, 81 mg at 03/30/24 0953    sennosides-docusate (PERICOLACE) 8.6-50 MG per tablet 2 tablet, 2 tablet, Oral, BID PRN **AND** polyethylene glycol (MIRALAX) packet 17 g, 17 g, Oral, Daily PRN **AND** bisacodyl (DULCOLAX) EC tablet 5 mg, 5 mg, Oral, Daily PRN **AND** bisacodyl (DULCOLAX) suppository 10 mg, 10 mg, Rectal, Daily PRN, Laurence Cedeno MD    carvedilol (COREG) tablet 25 mg, 25 mg, Oral, Q12H, Laurence Cedeno MD, 25 mg at 03/30/24 1537    diphenhydrAMINE (BENADRYL) capsule 25 mg, 25 mg, Oral, Q6H PRN, Eduardo Hendricks MD, 25 mg at 03/29/24 0832    escitalopram (LEXAPRO) tablet 10 mg, 10 mg, Oral, Daily, Laurence Cedeno MD, 10 mg at 03/30/24 0952    folic acid (FOLVITE) tablet 1 mg, 1 mg, Oral, Daily, Laurence Cedeno MD, 1 mg at 03/30/24 0952    heparin (porcine) injection 3,800 Units, 3,800 Units, Intracatheter, PRN, Lance Martínez MD, 3,800 Units at 03/30/24 1538    hydrALAZINE (APRESOLINE) tablet 25 mg, 25 mg, Oral, TID, Eduardo Hendricks MD, 25 mg at 03/30/24 1537    hydroxychloroquine (PLAQUENIL) tablet 200 mg, 200 mg, Oral, Daily, Laurence Cedeno MD, 200 mg at 03/30/24 0953    lacosamide (VIMPAT) tablet 100 mg, 100 mg, Oral, Q12H, Laurence Cedeno MD, 100 mg at 03/30/24 0953    lisinopril (PRINIVIL,ZESTRIL) tablet 40 mg, 40 mg, Oral, Nightly, KadeissLance euceda MD    melatonin tablet 3 mg, 3 mg, Oral, Nightly PRN, Laurence Cedeno  MD Jose    mycophenolate (CELLCEPT) capsule 250 mg, 250 mg, Oral, Q12H, Laurence Cedeno MD, 250 mg at 03/30/24 0952    nitroglycerin (NITROSTAT) SL tablet 0.4 mg, 0.4 mg, Sublingual, Q5 Min PRN, Laurence Cedeno MD    ondansetron ODT (ZOFRAN-ODT) disintegrating tablet 4 mg, 4 mg, Oral, Q6H PRN **OR** ondansetron (ZOFRAN) injection 4 mg, 4 mg, Intravenous, Q6H PRN, Laurence Cedeno MD    oxyCODONE-acetaminophen (PERCOCET) 5-325 MG per tablet 1 tablet, 1 tablet, Oral, Q4H PRN, Laurence Cedeno MD, 1 tablet at 03/30/24 0214  Review of Systems:    Negative for nausea or abdominal pain, negative for fever chills    Objective     Vital Signs  Temp:  [98 °F (36.7 °C)-99.3 °F (37.4 °C)] 98 °F (36.7 °C)  Heart Rate:  [69-86] 69  Resp:  [16-19] 16  BP: (140-171)/() 171/121  Body mass index is 19.04 kg/m².    Intake/Output Summary (Last 24 hours) at 3/30/2024 1627  Last data filed at 3/30/2024 1445  Gross per 24 hour   Intake 280 ml   Output --   Net 280 ml     I/O this shift:  In: 280 [P.O.:280]  Out: -      Physical Exam:   General: patient awake, alert and cooperative   Eyes: Normal lids and lashes, no scleral icterus   Neck: supple, normal ROM   Skin: warm and dry, not jaundiced   Pulm:  regular and unlabored   Abdomen: soft, nontender, nondistended; normal bowel sounds   Psychiatric: Normal mood and behavior; memory intact     Results Review:     I reviewed the patient's new clinical results.    Results from last 7 days   Lab Units 03/30/24  0434 03/29/24  0547 03/28/24  1554   WBC 10*3/mm3 3.89 3.38* 5.03   HEMOGLOBIN g/dL 8.2* 6.5* 7.1*   HEMATOCRIT % 26.6* 21.8* 23.5*   PLATELETS 10*3/mm3 155 163 186     Results from last 7 days   Lab Units 03/30/24  0434 03/29/24  0547 03/28/24  1554   SODIUM mmol/L 134*  134* 142 137   POTASSIUM mmol/L 4.5  4.5 4.2 3.6   CHLORIDE mmol/L 102  100 107 100   CO2 mmol/L 23.1  23.5 26.0 28.3   BUN mg/dL 26*  24* 19 12   CREATININE mg/dL 5.25*  5.75*  4.18* 3.14*   CALCIUM mg/dL 8.5*  8.3* 7.8* 8.5*   BILIRUBIN mg/dL 0.3  --  0.3   ALK PHOS U/L 46  --  65   ALT (SGPT) U/L 6  --  9   AST (SGOT) U/L 11  --  16   GLUCOSE mg/dL 77  77 85 85         Lab Results   Lab Value Date/Time    LIPASE 219 (H) 03/30/2024 0740    LIPASE 190 (H) 03/30/2024 0434    LIPASE 529 (H) 03/29/2024 0547    LIPASE 628 (H) 03/28/2024 1554    LIPASE 205 (H) 03/09/2024 0809    LIPASE 161 (H) 03/02/2024 1138    LIPASE 141 (H) 03/01/2024 1137    LIPASE 389 (H) 02/07/2024 1259    LIPASE 416 (H) 01/29/2024 0413    LIPASE 194 (H) 01/28/2024 0408    LIPASE 189 (H) 01/27/2024 0018    LIPASE 295 (H) 01/26/2024 1654    LIPASE 42 10/26/2023 0054    LIPASE 10 (L) 09/10/2023 1901    LIPASE 22 02/15/2023 0343    LIPASE 22 02/14/2023 2355    LIPASE 47 11/25/2022 2109    LIPASE 27 11/22/2022 1059    LIPASE 32 11/21/2022 2030       Radiology:  MRI abdomen w wo contrast mrcp   Final Result       1. Large cysts seen adjacent to the pancreatic body and tail extending   posterior to the gastric fundus and into the splenic hilum, most likely   represent pseudocysts. Smaller cysts extend posteriorly from the   pancreatic tail to surround the suspected left adrenal gland.   2. Branching cystic appearance to the pancreatic tail and smaller   branching cysts in the pancreatic head which could be dilated pancreatic   side branches or cystic neoplasm such as sidebranch intraductal   papillary mucinous neoplasms. Recommend 3 to 6-month follow-up to   reevaluate    3. Liver and spleen lose signal intensity on in phase imaging indicating   iron deposition. Suspect secondary hemachromatosis.   4. Mild to moderate free intraperitoneal fluid,   5. Small pleural effusions.   6. Cardiomegaly.   7. Body wall edema/anasarca.       This report was finalized on 3/30/2024 12:52 PM by Dr. Lance Moore M.D on Workstation: [id number]          CT Abdomen Pelvis Without Contrast   Final Result      XR Chest 1 View   Final  Result   No interval change or convincing evidence for active disease   in the chest.       This report was finalized on 3/28/2024 5:17 PM by Dr. Adolph Londono M.D on Workstation: [id number]              Assessment & Plan     Active Hospital Problems    Diagnosis     **Acute on chronic pancreatitis     Thoracic ascending aortic aneurysm     ESRD (end stage renal disease)     History of CVA (cerebrovascular accident)     Severe aortic valve regurgitation     Chronic diastolic CHF (congestive heart failure)     Essential hypertension     Seizure disorder     Systemic lupus erythematosus     Lupus nephritis, ISN/RPS class IV     Hypertension secondary to other renal disorders     Iron deficiency anemia      All problems are new to me today  Assessment:  Recurrent pancreatitis with pseudocyst  ESRD on HD on TRS  SLE on immunosuppression  Peripheral vascular disease  Acute on chronic anemia - no overt GI bleeding      Plan:  No GI symptoms at present.  Advance to low-fat diet.  CT and US imaging with no evidence of cholelithiasis, normal IgG 4, glycerides, calcium. She denies recent alcohol use.   Reviewed MRCP results with patient-multiple pancreatic fluid collections noted.  Will need repeat imaging in 3 months.  Outpatient follow-up scheduled with Dr. Valles to [place] from GI standpoint if she can tolerate p.o.    I discussed the patients findings and my recommendations with patient.            Debra Olivares M.D.  [facility]  [phone]

## 2024-03-30 NOTE — PROGRESS NOTES
[facility] Progress Note      Patient Name: Dee Herrera  : 1990  MRN: [id number]  Primary Care Physician:  Margarita Woods, CAESAR  Date of admission: 3/28/2024    Subjective     Interval History:   F/u ESRD    Review of Systems:   BP improved 140 to 150/100 range (vs 222/130)  Transfused yesterday  Denies dyspnea     Objective     Vitals:   Temp:  [98.2 °F (36.8 °C)-99.3 °F (37.4 °C)] 98.8 °F (37.1 °C)  Heart Rate:  [64-86] 75  Resp:  [18-19] 19  BP: (132-180)/() 145/99    Intake/Output Summary (Last 24 hours) at 3/30/2024 0742  Last data filed at 3/29/2024 1422  Gross per 24 hour   Intake 379 ml   Output --   Net 379 ml       Physical Exam:    General Appearance: comfortable alert on RA  Neck: LIJ TDC, no JVD  Lungs: CTA bilat no rales  Heart: RRR, normal S1 and S2  Abdomen: soft, nontender, nondistended  Extremities: no edema, cyanosis or clubbing      Scheduled Meds:     aspirin, 81 mg, Oral, Daily  carvedilol, 25 mg, Oral, Q12H  escitalopram, 10 mg, Oral, Daily  folic acid, 1 mg, Oral, Daily  hydrALAZINE, 25 mg, Oral, TID  hydroxychloroquine, 200 mg, Oral, Daily  lacosamide, 100 mg, Oral, Q12H  lisinopril, 20 mg, Oral, Nightly  mycophenolate, 250 mg, Oral, Q12H      IV Meds:        Results Reviewed:   I have personally reviewed the results from the time of this admission to 3/30/2024 07:42 EDT     Results from last 7 days   Lab Units 24  0434 24  0547 24  1554   SODIUM mmol/L 134* 142 137   POTASSIUM mmol/L 4.5 4.2 3.6   CHLORIDE mmol/L 100 107 100   CO2 mmol/L 23.5 26.0 28.3   BUN mg/dL 24* 19 12   CREATININE mg/dL 5.75* 4.18* 3.14*   CALCIUM mg/dL 8.3* 7.8* 8.5*   BILIRUBIN mg/dL  --   --  0.3   ALK PHOS U/L  --   --  65   ALT (SGPT) U/L  --   --  9   AST (SGOT) U/L  --   --  16   GLUCOSE mg/dL 77 85 85     Estimated Creatinine Clearance: 12 mL/min (A) (by C-G formula based on SCr of 5.75 mg/dL (H)).  Results from last 7 days   Lab Units  03/30/24  0434   PHOSPHORUS mg/dL 2.8         Results from last 7 days   Lab Units 03/30/24  0434 03/29/24  0547 03/28/24  1554   WBC 10*3/mm3 3.89 3.38* 5.03   HEMOGLOBIN g/dL 8.2* 6.5* 7.1*   PLATELETS 10*3/mm3 155 163 186           Assessment / Plan     ASSESSMENT:  1.  ESRD: HD TTS via LIJ TDC.  Lytes stable.  Mild vol excess on basis of small pleural effusion, and perhaps reflected by HTN urgency.    2.  HTN urgency - related in part to skipped doses of meds but historically difficult BP control.  Improved.  Hydralazine inc'd 25 TID.  Max coreg.  Room to inc ACEi  3.  Recurrent pancreatitis with pseudocyst - GI eval noted, plan MRCP  4.  SLE on immunosuppression  5.  PVD with ascending aortic aneurysm s/p repair  6.  Anemia of ESRD +/- ABL, hgb down to 6.5 yesterday, transfused, now 8.2  7.  Pruritus, likely due to Dilaudid    PLAN:  HD today, remove 3L as tolerated, should further aid BP control  Inc ACEi 40mg  Note plan for MRCP      Lance Martínez MD  03/30/24  07:42 EDT    [facility]  [phone]

## 2024-03-30 NOTE — PLAN OF CARE
Goal Outcome Evaluation:  Plan of Care Reviewed With: patient        Progress: no change  Outcome Evaluation: NPO for MRCP and then dialysis afterwards.  Asks for pain meds at times, but when I go into the room she is asleep.  Up ad italo in room.

## 2024-03-31 ENCOUNTER — READMISSION MANAGEMENT (OUTPATIENT)
Dept: CALL CENTER | Facility: HOSPITAL | Age: 34
End: 2024-03-31
Payer: MEDICARE

## 2024-03-31 VITALS
SYSTOLIC BLOOD PRESSURE: 177 MMHG | HEIGHT: 66 IN | WEIGHT: 115.96 LBS | HEART RATE: 80 BPM | TEMPERATURE: 97.8 F | BODY MASS INDEX: 18.64 KG/M2 | RESPIRATION RATE: 16 BRPM | OXYGEN SATURATION: 96 % | DIASTOLIC BLOOD PRESSURE: 81 MMHG

## 2024-03-31 LAB
ALBUMIN SERPL-MCNC: 2.4 G/DL (ref 3.5–5.2)
ALBUMIN/GLOB SERPL: 0.8 G/DL
ALP SERPL-CCNC: 48 U/L (ref 39–117)
ALT SERPL W P-5'-P-CCNC: 6 U/L (ref 1–33)
ANION GAP SERPL CALCULATED.3IONS-SCNC: 9 MMOL/L (ref 5–15)
AST SERPL-CCNC: 12 U/L (ref 1–32)
BASOPHILS # BLD AUTO: 0.01 10*3/MM3 (ref 0–0.2)
BASOPHILS NFR BLD AUTO: 0.3 % (ref 0–1.5)
BILIRUB SERPL-MCNC: 0.3 MG/DL (ref 0–1.2)
BUN SERPL-MCNC: 11 MG/DL (ref 6–20)
BUN/CREAT SERPL: 3.2 (ref 7–25)
CALCIUM SPEC-SCNC: 7.9 MG/DL (ref 8.6–10.5)
CHLORIDE SERPL-SCNC: 105 MMOL/L (ref 98–107)
CO2 SERPL-SCNC: 24 MMOL/L (ref 22–29)
CREAT SERPL-MCNC: 3.42 MG/DL (ref 0.57–1)
DEPRECATED RDW RBC AUTO: 49.7 FL (ref 37–54)
EGFRCR SERPLBLD CKD-EPI 2021: 17.7 ML/MIN/1.73
EOSINOPHIL # BLD AUTO: 0.11 10*3/MM3 (ref 0–0.4)
EOSINOPHIL NFR BLD AUTO: 3 % (ref 0.3–6.2)
ERYTHROCYTE [DISTWIDTH] IN BLOOD BY AUTOMATED COUNT: 17.4 % (ref 12.3–15.4)
GLOBULIN UR ELPH-MCNC: 3.1 GM/DL
GLUCOSE SERPL-MCNC: 83 MG/DL (ref 65–99)
HCT VFR BLD AUTO: 26.9 % (ref 34–46.6)
HGB BLD-MCNC: 8.3 G/DL (ref 12–15.9)
IMM GRANULOCYTES # BLD AUTO: 0.02 10*3/MM3 (ref 0–0.05)
IMM GRANULOCYTES NFR BLD AUTO: 0.6 % (ref 0–0.5)
LYMPHOCYTES # BLD AUTO: 0.56 10*3/MM3 (ref 0.7–3.1)
LYMPHOCYTES NFR BLD AUTO: 15.5 % (ref 19.6–45.3)
MCH RBC QN AUTO: 24.7 PG (ref 26.6–33)
MCHC RBC AUTO-ENTMCNC: 30.9 G/DL (ref 31.5–35.7)
MCV RBC AUTO: 80.1 FL (ref 79–97)
MONOCYTES # BLD AUTO: 0.55 10*3/MM3 (ref 0.1–0.9)
MONOCYTES NFR BLD AUTO: 15.2 % (ref 5–12)
NEUTROPHILS NFR BLD AUTO: 2.36 10*3/MM3 (ref 1.7–7)
NEUTROPHILS NFR BLD AUTO: 65.4 % (ref 42.7–76)
NRBC BLD AUTO-RTO: 0 /100 WBC (ref 0–0.2)
PLATELET # BLD AUTO: 159 10*3/MM3 (ref 140–450)
PMV BLD AUTO: 10.5 FL (ref 6–12)
POTASSIUM SERPL-SCNC: 3.8 MMOL/L (ref 3.5–5.2)
PROT SERPL-MCNC: 5.5 G/DL (ref 6–8.5)
RBC # BLD AUTO: 3.36 10*6/MM3 (ref 3.77–5.28)
SODIUM SERPL-SCNC: 138 MMOL/L (ref 136–145)
WBC NRBC COR # BLD AUTO: 3.61 10*3/MM3 (ref 3.4–10.8)

## 2024-03-31 PROCEDURE — 85025 COMPLETE CBC W/AUTO DIFF WBC: CPT | Performed by: INTERNAL MEDICINE

## 2024-03-31 PROCEDURE — 80053 COMPREHEN METABOLIC PANEL: CPT | Performed by: HOSPITALIST

## 2024-03-31 PROCEDURE — 25010000002 HEPARIN (PORCINE) PER 1000 UNITS: Performed by: INTERNAL MEDICINE

## 2024-03-31 PROCEDURE — 63710000001 MYCOPHENOLATE MOFETIL PER 250 MG: Performed by: INTERNAL MEDICINE

## 2024-03-31 RX ORDER — OXYCODONE HYDROCHLORIDE AND ACETAMINOPHEN 5; 325 MG/1; MG/1
1 TABLET ORAL EVERY 4 HOURS PRN
Qty: 15 TABLET | Refills: 0 | Status: SHIPPED | OUTPATIENT
Start: 2024-03-31

## 2024-03-31 RX ADMIN — FOLIC ACID 1 MG: 1 TABLET ORAL at 08:33

## 2024-03-31 RX ADMIN — HYDRALAZINE HYDROCHLORIDE 25 MG: 25 TABLET ORAL at 14:52

## 2024-03-31 RX ADMIN — LACOSAMIDE 100 MG: 100 TABLET, FILM COATED ORAL at 08:33

## 2024-03-31 RX ADMIN — HEPARIN SODIUM 3800 UNITS: 1000 INJECTION INTRAVENOUS; SUBCUTANEOUS at 13:00

## 2024-03-31 RX ADMIN — CARVEDILOL 25 MG: 25 TABLET, FILM COATED ORAL at 08:33

## 2024-03-31 RX ADMIN — HYDRALAZINE HYDROCHLORIDE 25 MG: 25 TABLET ORAL at 08:33

## 2024-03-31 RX ADMIN — ESCITALOPRAM OXALATE 10 MG: 10 TABLET, FILM COATED ORAL at 08:33

## 2024-03-31 RX ADMIN — ASPIRIN 81 MG: 81 TABLET, CHEWABLE ORAL at 08:33

## 2024-03-31 RX ADMIN — OXYCODONE AND ACETAMINOPHEN 1 TABLET: 5; 325 TABLET ORAL at 06:57

## 2024-03-31 RX ADMIN — MYCOPHENOLATE MOFETIL 250 MG: 250 CAPSULE ORAL at 08:33

## 2024-03-31 RX ADMIN — HYDROXYCHLOROQUINE SULFATE 200 MG: 200 TABLET, FILM COATED ORAL at 08:33

## 2024-03-31 NOTE — PROGRESS NOTES
[facility] Progress Note      Patient Name: Dee Herrera  : 1990  MRN: [id number]  Primary Care Physician:  Margarita Woods, CAESAR  Date of admission: 3/28/2024    Subjective     Interval History:   F/u ESRD     Review of Systems:   HD dony well yetserday 2.4L removed  HD again today post gadolinium in progress  Denies dyspnea    Objective     Vitals:   Temp:  [97.8 °F (36.6 °C)-99.1 °F (37.3 °C)] 97.8 °F (36.6 °C)  Heart Rate:  [66-95] 66  Resp:  [16] 16  BP: (113-171)/() 135/79    Intake/Output Summary (Last 24 hours) at 3/31/2024 1155  Last data filed at 3/31/2024 0834  Gross per 24 hour   Intake 400 ml   Output --   Net 400 ml       Physical Exam:    General Appearance: on HD comfortable alert on RA  Neck: RIJ TDC in use, no JVD  Lungs: CTA bilat no rales  Heart: RRR, normal S1 and S2  Abdomen: soft, nontender, nondistended  Extremities: no edema, cyanosis or clubbing      Scheduled Meds:     aspirin, 81 mg, Oral, Daily  carvedilol, 25 mg, Oral, Q12H  escitalopram, 10 mg, Oral, Daily  folic acid, 1 mg, Oral, Daily  hydrALAZINE, 25 mg, Oral, TID  hydroxychloroquine, 200 mg, Oral, Daily  lacosamide, 100 mg, Oral, Q12H  lisinopril, 40 mg, Oral, Nightly  mycophenolate, 250 mg, Oral, Q12H      IV Meds:        Results Reviewed:   I have personally reviewed the results from the time of this admission to 3/31/2024 11:55 EDT     Results from last 7 days   Lab Units 24  0651 24  0434 24  0547 24  1554   SODIUM mmol/L 138 134*  134* 142 137   POTASSIUM mmol/L 3.8 4.5  4.5 4.2 3.6   CHLORIDE mmol/L 105 102  100 107 100   CO2 mmol/L 24.0 23.1  23.5 26.0 28.3   BUN mg/dL 11 26*  24* 19 12   CREATININE mg/dL 3.42* 5.25*  5.75* 4.18* 3.14*   CALCIUM mg/dL 7.9* 8.5*  8.3* 7.8* 8.5*   BILIRUBIN mg/dL 0.3 0.3  --  0.3   ALK PHOS U/L 48 46  --  65   ALT (SGPT) U/L 6 6  --  9   AST (SGOT) U/L 12 11  --  16   GLUCOSE mg/dL 83 77  77 85 85     Estimated  Creatinine Clearance: 19.8 mL/min (A) (by C-G formula based on SCr of 3.42 mg/dL (H)).  Results from last 7 days   Lab Units 03/30/24  0434   PHOSPHORUS mg/dL 2.8         Results from last 7 days   Lab Units 03/31/24  0651 03/30/24  0434 03/29/24  0547 03/28/24  1554   WBC 10*3/mm3 3.61 3.89 3.38* 5.03   HEMOGLOBIN g/dL 8.3* 8.2* 6.5* 7.1*   PLATELETS 10*3/mm3 159 155 163 186           Assessment / Plan     ASSESSMENT:  1.  ESRD: HD TTS via LIJ TDC.  Lytes stable.  Mild vol excess on basis of small pleural effusion, and perhaps reflected by HTN urgency.  Removed 2.5L yesterday.  HD again today for gadolinium contrast removal   2.  HTN urgency - related in part to skipped doses of meds but historically difficult BP control.  Improved.  Hydralazine inc'd 25 TID.  Max coreg.  Inc'd ACE 40 mg yesterday  3.  Recurrent pancreatitis with pseudocyst - GI eval and MRCP noted  4.  SLE on immunosuppression  5.  PVD with ascending aortic aneurysm s/p repair  6.  Anemia of ESRD +/- ABL, transfused 3/29, hgb stable 8.3, on long acting YULIANA at dialysis   7.  Pruritus, likely due to Dilaudid    PLAN:  HD in progress, no UF  No objection to discharge today as planned.  Next dialysis TUES at [facility]      Lance Martínez MD  03/31/24  11:55 EDT    [facility]  [phone]

## 2024-03-31 NOTE — OUTREACH NOTE
Prep Survey      Flowsheet Row Responses   [facility] patient discharged from? [facility]   Is LACE score < 7 ? No   Eligibility [facility]   Date of Admission 03/28/24   Discharge Disposition Home or Self Care   Discharge diagnosis Acute on chronic pancreatitis   Does the patient have one of the following disease processes/diagnoses(primary or secondary)? Other   Does the patient have Home health ordered? No   Is there a DME ordered? No   Prep survey completed? Yes            ROBERT BARBER - Registered Nurse

## 2024-03-31 NOTE — PLAN OF CARE
Problem: Adult Inpatient Plan of Care  Goal: Plan of Care Review  Outcome: Met  Flowsheets  Taken 3/31/2024 1113 by Lindsey Calderón, RN  Outcome Evaluation: Alert, oriented, cooperative. Able to verbalize importance of HD schedule, medications as directed, diet, following up with providers including calling to schedule 1-2 week follow up with Dr Kaminski as directed by GI consult. Off floor for HD prior to d/c as ordered.  Taken 3/31/2024 0429 by Drew Mendoza RN  Plan of Care Reviewed With: patient  Goal: Patient-Specific Goal (Individualized)  Outcome: Met  Goal: Absence of Hospital-Acquired Illness or Injury  Outcome: Met  Intervention: Identify and Manage Fall Risk  Description: Perform standard risk assessment on admission using a validated tool or comprehensive approach appropriate to the patient; reassess fall risk frequently, with change in status or transfer to another level of care.  Communicate fall injury risk to interprofessional healthcare team.  Determine need for increased observation, equipment and environmental modification, such as low bed, signage and supportive, nonskid footwear.  Adjust safety measures to individual developmental age, stage and identified risk factors.  Reinforce the importance of safety and physical activity with patient and family.  Perform regular intentional rounding to assess need for position change, pain assessment and personal needs, including assistance with toileting.  Recent Flowsheet Documentation  Taken 3/31/2024 1108 by Lindsey Calderón, RN  Safety Promotion/Fall Prevention: (off floor to HD) patient off unit  Taken 3/31/2024 1000 by Lindsey Calderón, RN  Safety Promotion/Fall Prevention:   assistive device/personal items within reach   clutter free environment maintained   fall prevention program maintained   nonskid shoes/slippers when out of bed   room organization consistent   safety round/check completed  Taken 3/31/2024 0834 by Lindsey Calderón, RN  Safety  Promotion/Fall Prevention:   assistive device/personal items within reach   clutter free environment maintained   fall prevention program maintained   nonskid shoes/slippers when out of bed   room organization consistent   safety round/check completed  Intervention: Prevent Skin Injury  Description: Perform a screening for skin injury risk, such as pressure or moisture associated skin damage on admission and at regular intervals throughout hospital stay.  Keep all areas of skin (especially folds) clean and dry.  Maintain adequate skin hydration.  Relieve and redistribute pressure and protect bony prominences; implement measures based on patient-specific risk factors.  Match turning and repositioning schedule to clinical condition.  Encourage weight shift frequently; assist with reposition if unable to complete independently.  Float heels off bed; avoid pressure on the Achilles tendon.  Keep skin free from extended contact with medical devices.  Encourage functional activity and mobility, as early as tolerated.  Use aids (e.g., slide boards, mechanical lift) during transfer.  Recent Flowsheet Documentation  Taken 3/31/2024 1000 by Lindsey Calderón RN  Body Position:   position changed independently   left   tilted   weight shifting   neutral body alignment   neutral head position  Taken 3/31/2024 0834 by Lindsey Calderón RN  Body Position:   position changed independently   left   side-lying   neutral body alignment   neutral head position  Skin Protection:   adhesive use limited   tubing/devices free from skin contact  Intervention: Prevent and Manage VTE (Venous Thromboembolism) Risk  Description: Assess for VTE (venous thromboembolism) risk.  Encourage and assist with early ambulation.  Initiate and maintain compression or other therapy, as indicated, based on identified risk in accordance with organizational protocol and provider order.  Encourage both active and passive leg exercises while in bed, if unable to  ambulate.  Recent Flowsheet Documentation  Taken 3/31/2024 1000 by Lindsey Calderón RN  Activity Management: up ad italo  Taken 3/31/2024 0834 by Lindsey Calderón RN  Activity Management: up ad italo  Intervention: Prevent Infection  Description: Maintain skin and mucous membrane integrity; promote hand, oral and pulmonary hygiene.  Optimize fluid balance, nutrition, sleep and glycemic control to maximize infection resistance.  Identify potential sources of infection early to prevent or mitigate progression of infection (e.g., wound, lines, devices).  Evaluate ongoing need for invasive devices; remove promptly when no longer indicated.  Recent Flowsheet Documentation  Taken 3/31/2024 1000 by Lindsey Calderón, RN  Infection Prevention:   environmental surveillance performed   equipment surfaces disinfected   hand hygiene promoted   single patient room provided  Taken 3/31/2024 0834 by Lindsey Calderón RN  Infection Prevention:   environmental surveillance performed   equipment surfaces disinfected   hand hygiene promoted   single patient room provided  Goal: Optimal Comfort and Wellbeing  Outcome: Met  Intervention: Monitor Pain and Promote Comfort  Description: Assess pain level, treatment efficacy and patient response at regular intervals using a consistent pain scale.  Consider the presence and impact of preexisting chronic pain.  Encourage patient and caregiver involvement in pain assessment, interventions and safety measures.  Recent Flowsheet Documentation  Taken 3/31/2024 0834 by Lindsey Calderón RN  Pain Management Interventions:   care clustered   quiet environment facilitated  Intervention: Provide Person-Centered Care  Description: Use a family-focused approach to care.  Develop trust and rapport by proactively providing information, encouraging questions, addressing concerns and offering reassurance.  Acknowledge emotional response to hospitalization.  Recognize and utilize personal coping strategies.  Honor  spiritual and cultural preferences.  Recent Flowsheet Documentation  Taken 3/31/2024 0834 by Lindsey Calderón RN  Trust Relationship/Rapport:   care explained   choices provided   emotional support provided   empathic listening provided   questions answered   questions encouraged   reassurance provided   thoughts/feelings acknowledged  Goal: Readiness for Transition of Care  Outcome: Met     Problem: Hypertension Comorbidity  Goal: Blood Pressure in Desired Range  Outcome: Met  Intervention: Maintain Blood Pressure Management  Description: Evaluate adherence to home antihypertensive regimen (e.g., exercise and activity, diet modification, medication).  Provide scheduled antihypertensive medication; consider administration time and effects (e.g., avoid giving diuretic prior to bedtime).  Monitor response to antihypertensive medication therapy (e.g., blood pressure, electrolyte levels, medication effects).  Minimize risk of orthostatic hypotension; encourage caution with position changes, particularly if elderly.  Recent Flowsheet Documentation  Taken 3/31/2024 1000 by Lindsey Calderón, RN  Medication Review/Management: medications reviewed  Taken 3/31/2024 0834 by Lindsey Calderón RN  Medication Review/Management: medications reviewed     Problem: Pain Chronic (Persistent) (Comorbidity Management)  Goal: Acceptable Pain Control and Functional Ability  Outcome: Met  Intervention: Manage Persistent Pain  Description: Evaluate pain level, effect of treatment and patient response at regular intervals.  Minimize pain stimuli; coordinate care and adjust environment (e.g., light, noise, unnecessary movement); promote sleep/rest.  Match pharmacologic analgesia to severity and type of pain mechanism (e.g., neuropathic, muscle, inflammatory); consider multimodal approach (e.g., nonopioid, opioid, adjuvant).  Provide medication at regular intervals; titrate to patient response.  Manage breakthrough pain with additional doses;  consider rotation or switching medication.  Monitor for signs of substance tolerance (increased dose to reach desired effect, decreased effect with same dose).  Avoid abrupt withdrawal of medication, especially agents capable of causing physical dependence.  Manage medication-induced effects, such as constipation, nausea, pruritus, urinary retention, somnolence and dizziness.  Provide multimodal treatment interventions, such as physical activity, therapeutic exercise, yoga, TENS (transcutaneous electrical nerve stimulation) and manual therapy.  Train in functional activity modifications, such as body mechanics, posture, ergonomics, energy conservation and activity pacing.  Consider addition of complementary or alternative therapy, such as acupuncture, hypnosis or therapeutic touch.  Recent Flowsheet Documentation  Taken 3/31/2024 1000 by Lindsey Calderón RN  Medication Review/Management: medications reviewed  Taken 3/31/2024 0834 by Lindsey Calderón RN  Medication Review/Management: medications reviewed  Intervention: Develop Pain Management Plan  Description: Acknowledge patient as the expert in pain self-management.  Use a consistent, validated tool for pain assessment; include function and quality of life.  Evaluate risk for opioid use and dependence.  Set pain management goals; determine acceptable level of discomfort to allow for maximal functioning and quality of life.  Determine [id number] pain management plan, including both pharmacologic and nonpharmacologic measures.  Identify and integrate past successful treatment measures, if able.  Encourage patient and caregiver involvement in pain assessment, interventions and safety measures.  Re-evaluate plan regularly.  Recent Flowsheet Documentation  Taken 3/31/2024 0834 by iLndsey Calderón, RN  Pain Management Interventions:   care clustered   quiet environment facilitated  Intervention: Optimize Psychosocial Wellbeing  Description: Facilitate patient’s  self-control over pain by providing pain information and allowing choices in treatment.  Consider and address emotional response to pain.  Explore and promote use of coping strategies; address barriers to successful coping.  Evaluate and assist with psychosocial, cultural and spiritual factors impacting pain.  Modify pain perception by using techniques, such as distraction, mindfulness, guided imagery, meditation or music.  Assess and monitor for signs and symptoms of behavioral health concerns, such as unhealthy substance use, depression and suicidal ideation.  Consider referral for ongoing coping support, such as cognitive behavioral therapy and mindfulness-based stress reduction.  Recent Flowsheet Documentation  Taken 3/31/2024 0834 by Lindsey Calderón RN  Supportive Measures:   active listening utilized   positive reinforcement provided   verbalization of feelings encouraged  Diversional Activities: smartphone     Problem: Pain Acute  Goal: Acceptable Pain Control and Functional Ability  Outcome: Met  Intervention: Prevent or Manage Pain  Description: Evaluate pain level, effect of treatment and patient response at regular intervals.  Minimize painful stimuli; coordinate care and adjust environment (e.g., light, noise, unnecessary movement); promote sleep/rest.  Match pharmacologic analgesia to severity and type of pain mechanism (e.g., neuropathic, muscle, inflammatory); consider multimodal approach (e.g., nonopioid, opioid, adjuvant).  Provide medication at regular intervals; titrate to patient response; premedicate for painful procedures.  Manage breakthrough pain with additional doses; consider rotation or switching medication.  Monitor for signs of substance tolerance (increased dose to reach desired effect, decreased effect with same dose).  Manage medication-induced effects, such as constipation, nausea, pruritus, urinary retention, somnolence and dizziness.  Provide multimodal interventions, such as as  physical activity, therapeutic exercise, yoga, TENS (transcutaneous electrical nerve stimulation) and manual therapy.  Train in functional activity modifications, such as body mechanics, posture, ergonomics, energy conservation and activity pacing.  Consider addition of complementary or alternative therapy, such as acupuncture, hypnosis or therapeutic touch.  Recent Flowsheet Documentation  Taken 3/31/2024 1000 by Lindsey Calderón RN  Medication Review/Management: medications reviewed  Taken 3/31/2024 0834 by Lindsey Calderón RN  Medication Review/Management: medications reviewed  Intervention: Develop Pain Management Plan  Description: Acknowledge patient as the expert in pain self-management.  Use a consistent, validated tool for pain assessment; include function and quality of life.  Evaluate risk for opioid use and dependence.  Set pain management goals; determine acceptable level of discomfort to allow for maximal functioning.  Determine [id number] pain management plan, including both pharmacologic and nonpharmacologic measures; integrate management of chronic (persistent) pain.  Identify and integrate past successful treatment measures, if able.  Encourage patient and caregiver involvement in pain assessment, interventions and safety measures.  Re-evaluate plan regularly.  Recent Flowsheet Documentation  Taken 3/31/2024 0834 by Lindsey Calderón, RN  Pain Management Interventions:   care clustered   quiet environment facilitated  Intervention: Optimize Psychosocial Wellbeing  Description: Facilitate patient’s self-control over pain by providing pain information and allowing choices in treatment.  Consider and address emotional response to pain.  Explore and promote use of coping strategies; address barriers to successful coping.  Evaluate and assist with psychosocial, cultural and spiritual factors impacting pain.  Modify pain perception using techniques, such as distraction, mindfulness, guided imagery,  meditation or music.  Assess for risk factors for developing chronic pain, such as depression, fear, pain avoidance and pain catastrophizing.  Consider referral for ongoing coping support, such as education, relaxation training and role of thoughts.  Recent Flowsheet Documentation  Taken 3/31/2024 0834 by Lindsey Calderón, RN  Supportive Measures:   active listening utilized   positive reinforcement provided   verbalization of feelings encouraged  Diversional Activities: smartphone   Goal Outcome Evaluation:              Outcome Evaluation: Alert, oriented, cooperative. Able to verbalize importance of HD schedule, medications as directed, diet, following up with providers including calling to schedule 1-2 week follow up with Dr Kaminski as directed by GI consult. Off floor for HD prior to d/c as ordered.

## 2024-03-31 NOTE — NURSING NOTE
Outpatient follow-up scheduled with Dr. Kaminski-okay to DC from GI standpoint if she can tolerate p.o. per Dr Olivares note 3/30/24. Tolerating PO. Agreeable to follow up with Dr Kaminski next 1-2 weeks.

## 2024-03-31 NOTE — CASE MANAGEMENT/SOCIAL WORK
Case Management Discharge Note      Final Note: home         Selected Continued Care - Discharged on 3/31/2024 Admission date: 3/28/2024 - Discharge disposition: Home or Self Care      Destination    No services have been selected for the patient.                Durable Medical Equipment    No services have been selected for the patient.                Dialysis/Infusion    No services have been selected for the patient.                Home Medical Care    No services have been selected for the patient.                Therapy    No services have been selected for the patient.                Community Resources    No services have been selected for the patient.                Community & DME    No services have been selected for the patient.                         Final Discharge Disposition Code: 01 - home or self-care

## 2024-03-31 NOTE — PLAN OF CARE
Goal Outcome Evaluation:  Plan of Care Reviewed With: patient        Progress: improving  Outcome Evaluation: Probable DC today.  Up ad italo in room.  Denies abd pain, but still has chronic back pain.  Denies nausea.

## 2024-03-31 NOTE — DISCHARGE SUMMARY
[facility]    Date of Discharge:  3/31/2024    PCP: Margarita Woods, APRN    Discharge Diagnosis:   Active Hospital Problems    Diagnosis  POA    **Acute on chronic pancreatitis [K85.90, K86.1]  Yes    Thoracic ascending aortic aneurysm [I71.21]  Yes    ESRD (end stage renal disease) [N18.6]  Yes    History of CVA (cerebrovascular accident) [Z86.73]  Not Applicable    Severe aortic valve regurgitation [I35.1]  Yes    Chronic diastolic CHF (congestive heart failure) [I50.32]  Yes    Essential hypertension [I10]  Yes    Seizure disorder [G40.909]  Yes    Systemic lupus erythematosus [M32.9]  Yes    Lupus nephritis, 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          Consults       Date and Time Order Name Status Description    3/28/2024 10:31 PM Inpatient Gastroenterology Consult Completed     3/28/2024 10:30 PM Inpatient Nephrology Consult      3/28/2024  5:32 PM [facility] (on-call MD unless specified) Details      3/2/2024 10:53 AM Inpatient Nephrology Consult Completed           Hospital Course  33 y.o. female who is a young unfortunate female who requires hemodialysis of which she uses it on Tuesday Thursday and Saturdays.  She also is stricken with acute on chronic issues of pancreatitis.  Both GI and renal saw in consultation on this admission.  To summarize, patient was found to have acute on chronic pancreatitis.  This has been a recurrent theme and unfortunately she is developing complications such as pseudocyst.  GI ordered an MRCP and you can see report for further clarification but they plan on following up with those abnormalities with repeat MRI in the near future and will defer coordination of that to GI.  Her LFTs and bilirubin are normal.  She underwent her dialysis while she was here she is  normally dialyzed on Tuesday Thursday and Saturdays.  She also has SLE and is immunosuppressed and is also maintained on CellCept and Plaquenil.  She had some refractory hypertension that with additional dialysis her blood pressure trends have improved.  She is maintained on numerous medications which are outlined below.  She also has past histories of AAA with previous repair and no dissection noted on current imaging with aspects of PVD.  She did develop some pruritus secondary to Dilaudid.  Her pain control was adequate on p.o. Percocet and she did receive #15 for me at the time of discharge.  I gave her a small amount and advised her to follow-up with GI versus PCP for further chronic dosing.  Otherwise her vital signs are stable as well as afebrile and I think the patient is medically stable to be transition to outpatient management care plan.  She is very much amenable to this above plan and is thankful for the care she received while here as well as amenable to discharge today.  No additional discharge needs are endorsed per patient.        Temp:  [98 °F (36.7 °C)-99.1 °F (37.3 °C)] 99.1 °F (37.3 °C)  Heart Rate:  [69-95] 73  Resp:  [16] 16  BP: (113-171)/() 167/105  Body mass index is 18.72 kg/m².    Physical Exam  HENT:      Head: Normocephalic.      Nose: Nose normal.      Mouth/Throat:      Mouth: Mucous membranes are moist.      Pharynx: Oropharynx is clear.   Cardiovascular:      Rate and Rhythm: Normal rate and regular rhythm.   Pulmonary:      Effort: Pulmonary effort is normal. No respiratory distress.      Breath sounds: Normal breath sounds.   Abdominal:      General: Bowel sounds are normal.      Palpations: Abdomen is soft.      Tenderness: There is no abdominal tenderness. There is no guarding or rebound.   Neurological:      Mental Status: She is alert and oriented to person, place, and time.       Disposition: Home or Self Care       Discharge Medications        Continue These  Medications        Instructions Start Date   aspirin 81 MG chewable tablet   81 mg, Oral, Daily      carvedilol 25 MG tablet  Commonly known as: COREG   25 mg, Oral, Every 12 Hours Scheduled      Diclofenac Sodium 1 % gel gel  Commonly known as: VOLTAREN   4 g, Topical, 3 Times Daily      escitalopram 10 MG tablet  Commonly known as: LEXAPRO   10 mg, Oral, Daily      hydrALAZINE 25 MG tablet  Commonly known as: APRESOLINE   Take 1 tablet by mouth Every 12 (Twelve) Hours.      hydroxychloroquine 200 MG tablet  Commonly known as: PLAQUENIL   200 mg, Oral, Daily      lacosamide 100 MG tablet tablet  Commonly known as: VIMPAT   100 mg, Oral, Every 12 Hours Scheduled      lisinopril 20 MG tablet  Commonly known as: PRINIVIL,ZESTRIL   Take 1 tablet by mouth Every Night.      mycophenolate 500 MG tablet  Commonly known as: CELLCEPT   250 mg, Oral, Every 12 Hours Scheduled      ondansetron 4 MG tablet  Commonly known as: Zofran   4 mg, Oral, Every 8 Hours PRN      oxyCODONE-acetaminophen 5-325 MG per tablet  Commonly known as: PERCOCET   1 tablet, Oral, Every 4 Hours PRN      polyethylene glycol 17 GM/SCOOP powder  Commonly known as: MIRALAX   17 g, Oral, As Needed                Additional Instructions for the Follow-ups that You Need to Schedule       Discharge Follow-up with PCP   As directed       Currently Documented PCP:    Margarita Woods APRN    PCP Phone Number:    [phone]     Follow Up Details: PCP 2 to 3 weeks as needed.  GI per their recommendations.  Renal through HD on Tuesday Thursday and Saturdays               Follow-up Information       Margarita Woods APRN .    Specialties: Nurse Practitioner, Internal Medicine, Family Medicine  Why: PCP 2 to 3 weeks as needed.  GI per their recommendations.  Renal through HD on Tuesday Thursday and Saturdays  Contact information:  [address] [place] [address]  [phone]                            Future Appointments   Date Time Provider Department  Center   4/8/2024  8:00 AM Margarita Woods APRN [facility]   4/9/2024 11:00 AM Chris Medina MD [facility]   5/15/2024  3:30 PM Drew Kaminski MD [facility]        Radhames Davis MD  [facility]  03/31/24    Discharge time spent greater than 30 minutes.

## 2024-03-31 NOTE — NURSING NOTE
Returned from HD. VSS. Reports feeling better. No c/o pain voice. Up in room gathering belongings, preparing for discharge. Awaiting family to transport. IV removed, catheter intact, dressing applied. No bleeding noted.

## 2024-04-01 ENCOUNTER — TRANSITIONAL CARE MANAGEMENT TELEPHONE ENCOUNTER (OUTPATIENT)
Dept: CALL CENTER | Facility: HOSPITAL | Age: 34
End: 2024-04-01
Payer: MEDICARE

## 2024-04-01 NOTE — OUTREACH NOTE
"Call Center TCM Note      Flowsheet Row Responses   [facility] patient discharged from? [place]   Does the patient have one of the following disease processes/diagnoses(primary or secondary)? Other   TCM attempt successful? Yes   Call start time 1650   Call end time 1652   Discharge diagnosis Acute on chronic pancreatitis   Meds reviewed with patient/caregiver? Yes   Does the patient have all medications ordered at discharge? N/A   Is the patient taking all medications as directed (includes completed medication regime)? Yes   Comments Pt has appt on 4/8 as a regular office visit.  Will message office to see if the can change to 30 min TCM appt.   Does the patient have an appointment with their PCP within 7-14 days of discharge? Yes   Psychosocial issues? No   Did the patient receive a copy of their discharge instructions? Yes   Nursing interventions Reviewed instructions with patient   What is the patient's perception of their health status since discharge? Improving   Is the patient/caregiver able to teach back signs and symptoms related to disease process for when to call PCP? Yes   Is the patient/caregiver able to teach back signs and symptoms related to disease process for when to call 911? Yes   Is the patient/caregiver able to teach back the hierarchy of who to call/visit for symptoms/problems? PCP, Specialist, Home health nurse, Urgent Care, ED, 911 Yes   Additional teach back comments Call was brief and states she is doing \"ok\"   TCM call completed? Yes   Wrap up additional comments [facility]   Call end time 1652            Regi Velez LPN    4/1/2024, 16:53 EDT        "

## 2024-04-05 ENCOUNTER — TELEPHONE (OUTPATIENT)
Dept: CARDIAC SURGERY | Facility: CLINIC | Age: 34
End: 2024-04-05
Payer: MEDICARE

## 2024-04-05 NOTE — TELEPHONE ENCOUNTER
Left voicemail for pt advising that appt with Dr. Medina on 04/09/2024 would be cancelled and that she would need to call centralized scheduling and reschedule her CTA chest prior to rescheduling appt with Dr. Medina at [phone].

## 2024-04-08 ENCOUNTER — OFFICE VISIT (OUTPATIENT)
Dept: FAMILY MEDICINE CLINIC | Facility: CLINIC | Age: 34
End: 2024-04-08
Payer: MEDICARE

## 2024-04-08 DIAGNOSIS — R01.1 HEART MURMUR: ICD-10-CM

## 2024-04-08 DIAGNOSIS — I15.1 HYPERTENSION SECONDARY TO OTHER RENAL DISORDERS: ICD-10-CM

## 2024-04-08 DIAGNOSIS — Z99.2 ANEMIA DUE TO CHRONIC KIDNEY DISEASE, ON CHRONIC DIALYSIS: ICD-10-CM

## 2024-04-08 DIAGNOSIS — K86.3 PSEUDOCYST OF PANCREAS DUE TO CHRONIC PANCREATITIS: ICD-10-CM

## 2024-04-08 DIAGNOSIS — D63.1 ANEMIA DUE TO CHRONIC KIDNEY DISEASE, ON CHRONIC DIALYSIS: ICD-10-CM

## 2024-04-08 DIAGNOSIS — N18.6 ANEMIA DUE TO CHRONIC KIDNEY DISEASE, ON CHRONIC DIALYSIS: ICD-10-CM

## 2024-04-08 DIAGNOSIS — N18.6 END STAGE RENAL DISEASE ON DIALYSIS: ICD-10-CM

## 2024-04-08 DIAGNOSIS — Z99.2 END STAGE RENAL DISEASE ON DIALYSIS: ICD-10-CM

## 2024-04-08 DIAGNOSIS — Z09 HOSPITAL DISCHARGE FOLLOW-UP: Primary | ICD-10-CM

## 2024-04-08 DIAGNOSIS — K86.1 PSEUDOCYST OF PANCREAS DUE TO CHRONIC PANCREATITIS: ICD-10-CM

## 2024-04-08 RX ORDER — BUDESONIDE AND FORMOTEROL FUMARATE DIHYDRATE 160; 4.5 UG/1; UG/1
2 AEROSOL RESPIRATORY (INHALATION)
COMMUNITY
Start: 2024-03-18

## 2024-04-08 RX ORDER — LISINOPRIL 20 MG/1
1 TABLET ORAL
COMMUNITY
Start: 2024-04-04

## 2024-04-08 NOTE — PROGRESS NOTES
Transitional Care Follow Up Visit  Subjective     Dee Esparzaes is a 33 y.o. female who presents for a transitional care management visit.    Within 48 business hours after discharge our office contacted her via telephone to coordinate her care and needs.      I reviewed and discussed the details of that call along with the discharge summary, hospital problems, inpatient lab results, inpatient diagnostic studies, and consultation reports with Dee.     Current outpatient and discharge medications have been reconciled for the patient.  Reviewed by: CAESAR Vee          3/31/2024     3:26 PM   Date of TCM Phone Call   [facility]   Date of Admission 3/28/2024   Discharge Disposition Home or Self Care     Risk for Readmission (LACE) Score: 16 (3/31/2024  6:00 AM)      History of Present Illness   Course During Hospital Stay:      33 y.o. female who is a young unfortunate female who requires hemodialysis of which she uses it on Tuesday Thursday and Saturdays.  She also is stricken with acute on chronic issues of pancreatitis.  Both GI and renal saw in consultation on this admission.  To summarize, patient was found to have acute on chronic pancreatitis.  This has been a recurrent theme and unfortunately she is developing complications such as pseudocyst.  GI ordered an MRCP and you can see report for further clarification but they plan on following up with those abnormalities with repeat MRI in the near future and will defer coordination of that to GI.  Her LFTs and bilirubin are normal.  She underwent her dialysis while she was here she is normally dialyzed on Tuesday Thursday and Saturdays.  She also has SLE and is immunosuppressed and is also maintained on CellCept and Plaquenil.  She had some refractory hypertension that with additional dialysis her blood pressure trends have improved.  She is maintained on numerous medications which are outlined below.  She also has past  "histories of AAA with previous repair and no dissection noted on current imaging with aspects of PVD.  She did develop some pruritus secondary to Dilaudid.  Her pain control was adequate on p.o. Percocet and she did receive #15 for me at the time of discharge.  I gave her a small amount and advised her to follow-up with GI versus PCP for further chronic dosing.  Otherwise her vital signs are stable as well as afebrile and I think the patient is medically stable to be transition to outpatient management care plan.  She is very much amenable to this above plan and is thankful for the care she received while here as well as amenable to discharge today.  No additional discharge needs are endorsed per patient.  Her Hgb dropped to 6.5 in hospital and received 1 unit PRBC.  Her Hgb is 8.3 as of labs from 3/31/24.  She has dialysis on Tuesday.    She reports feeling good today.  She has not eaten anything today.  She denies having abdominal pain/bloating or back pain today.  She reports after she eats something it causes her abdominal pain and then feels abdominal bloating and pain through to her back.   She denies fever, chills, SOB, heart palpitations, chest pain, and syncope.  She was last dialyzed on Saturday.        The following portions of the patient's history were reviewed and updated as appropriate: allergies, current medications, past family history, past medical history, past social history, past surgical history, and problem list.    Review of Systems   Constitutional:  Negative for chills and fever.   Respiratory:  Negative for shortness of breath.    Cardiovascular:  Negative for chest pain and palpitations.   Gastrointestinal:  Positive for abdominal distention, abdominal pain and diarrhea. Negative for anal bleeding, blood in stool, constipation, nausea, rectal pain and vomiting.        After eating she feels abdominal pain 4/10 that is \"mild and tolerable\"; abdominal bloating when she eats or drinks; no " "pain today.       Objective   Vitals:    04/08/24 0757   BP: 172/96   BP Location: Left arm   Patient Position: Sitting   Cuff Size: Adult   Pulse: 84   Resp: 16   Weight: 52.2 kg (115 lb)   Height: 167.6 cm (65.98\")      Physical Exam  Vitals and nursing note reviewed.   Constitutional:       General: She is not in acute distress.     Appearance: Normal appearance. She is not ill-appearing.   HENT:      Head: Normocephalic and atraumatic.   Cardiovascular:      Rate and Rhythm: Normal rate and regular rhythm.      Heart sounds: Murmur heard.       with a grade of 3/6.      Comments: Heart murmur RUSB & LUSB.  Pulmonary:      Effort: Pulmonary effort is normal. No respiratory distress.      Breath sounds: No wheezing.      Comments: Diminished breath sounds throughout.  Abdominal:      General: Abdomen is flat. Bowel sounds are normal. There is no distension.      Palpations: There is no mass.      Tenderness: There is abdominal tenderness. There is no guarding or rebound.   Musculoskeletal:      Right lower leg: No edema.      Left lower leg: No edema.   Skin:     General: Skin is warm.   Neurological:      Mental Status: She is alert.   Psychiatric:         Mood and Affect: Mood normal.         Assessment & Plan   Diagnoses and all orders for this visit:    1. Hospital discharge follow-up (Primary)    2. 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  Encouraged low fat diet.  Followed by GI.  Encouraged to continue to follow-up with GI and she agreed.    3. End stage renal disease on dialysis  Assessment & Plan:  Dialysis T,Th,S; last dialyzed on Sat.; next dialysis is on Tues 3/9/24.  Followed by Hair Mckeon MD.      4. Anemia due to chronic kidney disease, on chronic dialysis  Assessment & Plan:  Followed by Nephrology.      5. Hypertension secondary to other renal disorders  Assessment & Plan:  Hypertension is uncontrolled.  She is scheduled for " dialysis Tuesday (3/9/24).  Continue current treatment regimen.  Dietary sodium restriction.  Weight loss.  Increase activity as tolerated.  Blood pressure will be re-assessed in 3 months.      6. Heart murmur  Assessment & Plan:  Asymptomatic; RUSB/LUSB 3/6, HR 84, unable to get O2Sat due to thick acrylic nails with rhinestones.  Will continue to monitor.

## 2024-04-11 VITALS
HEART RATE: 84 BPM | DIASTOLIC BLOOD PRESSURE: 96 MMHG | BODY MASS INDEX: 18.48 KG/M2 | SYSTOLIC BLOOD PRESSURE: 172 MMHG | RESPIRATION RATE: 16 BRPM | HEIGHT: 66 IN | WEIGHT: 115 LBS

## 2024-04-12 NOTE — ASSESSMENT & PLAN NOTE
Dialysis T,Th,S; last dialyzed on Sat.; next dialysis is on Tues 3/9/24.  Followed by Hair Mckeon MD.

## 2024-04-12 NOTE — ASSESSMENT & PLAN NOTE
Asymptomatic; RUSB/LUSB 3/6, HR 84, unable to get O2Sat due to thick acrylic nails with rhinestones.  Will continue to monitor.

## 2024-04-12 NOTE — ASSESSMENT & PLAN NOTE
Hypertension is uncontrolled.  She is scheduled for dialysis Tuesday (3/9/24).  Continue current treatment regimen.  Dietary sodium restriction.  Weight loss.  Increase activity as tolerated.  Blood pressure will be re-assessed in 3 months.

## 2024-04-17 DIAGNOSIS — N18.6 ESRD (END STAGE RENAL DISEASE): ICD-10-CM

## 2024-04-17 RX ORDER — OXYCODONE HYDROCHLORIDE AND ACETAMINOPHEN 5; 325 MG/1; MG/1
1 TABLET ORAL EVERY 4 HOURS PRN
Qty: 15 TABLET | Refills: 0 | OUTPATIENT
Start: 2024-04-17

## 2024-04-17 NOTE — TELEPHONE ENCOUNTER
Caller: TRAVON LEVI    Best call back number: [phone]     Requested Prescriptions:   Requested Prescriptions     Pending Prescriptions Disp Refills    oxyCODONE-acetaminophen (PERCOCET) 5-325 MG per tablet 15 tablet 0     Sig: Take 1 tablet by mouth Every 4 (Four) Hours As Needed for Moderate Pain.        Pharmacy where request should be sent: [id number] [address] AT [facility] [phone] [facility] [phone]      Last office visit with prescribing clinician: 4/8/2024   Last telemedicine visit with prescribing clinician: Visit date not found   Next office visit with prescribing clinician: 5/7/2024     Additional details provided by patient: PATIENT IS COMPLETELY OUT OF MEDICATION    Does the patient have less than a 3 day supply:  [x] Yes  [] No    Ara Jacob Rep   04/17/24 11:21 EDT

## 2024-04-24 ENCOUNTER — TELEPHONE (OUTPATIENT)
Dept: GASTROENTEROLOGY | Facility: CLINIC | Age: 34
End: 2024-04-24
Payer: MEDICARE

## 2024-04-24 NOTE — TELEPHONE ENCOUNTER
"Pt stated her weight is up to 23lbs.  Her abd is distended and \"full\"  she is feeling SOA at times, but is also c/o chest pressure.  She goes to dialysis 3 times a week.    She was wanting a paracentesis, but because she is also c/o chest pressure I have advised she go to the ER for further evaluation.  Pt is agreeable.  "

## 2024-04-24 NOTE — TELEPHONE ENCOUNTER
----- Message from Dee Herrera sent at 4/24/2024  8:56 AM EDT -----  Regarding: Fluid  Contact: [phone]  Are you able to drain fluid from my abdomen

## 2024-05-02 ENCOUNTER — TELEPHONE (OUTPATIENT)
Dept: GASTROENTEROLOGY | Facility: CLINIC | Age: 34
End: 2024-05-02
Payer: MEDICARE

## 2024-05-07 ENCOUNTER — OFFICE VISIT (OUTPATIENT)
Dept: FAMILY MEDICINE CLINIC | Facility: CLINIC | Age: 34
End: 2024-05-07
Payer: MEDICARE

## 2024-05-07 VITALS
WEIGHT: 118 LBS | HEART RATE: 79 BPM | OXYGEN SATURATION: 99 % | SYSTOLIC BLOOD PRESSURE: 136 MMHG | RESPIRATION RATE: 18 BRPM | DIASTOLIC BLOOD PRESSURE: 90 MMHG | BODY MASS INDEX: 18.96 KG/M2 | HEIGHT: 66 IN

## 2024-05-07 DIAGNOSIS — I15.1 HYPERTENSION SECONDARY TO OTHER RENAL DISORDERS: ICD-10-CM

## 2024-05-07 DIAGNOSIS — N18.6 ANEMIA DUE TO CHRONIC KIDNEY DISEASE, ON CHRONIC DIALYSIS: ICD-10-CM

## 2024-05-07 DIAGNOSIS — Z99.2 END STAGE RENAL DISEASE ON DIALYSIS: ICD-10-CM

## 2024-05-07 DIAGNOSIS — Z00.00 ENCOUNTER FOR SUBSEQUENT ANNUAL WELLNESS VISIT (AWV) IN MEDICARE PATIENT: Primary | ICD-10-CM

## 2024-05-07 DIAGNOSIS — N18.6 END STAGE RENAL DISEASE ON DIALYSIS: ICD-10-CM

## 2024-05-07 DIAGNOSIS — Z99.2 ANEMIA DUE TO CHRONIC KIDNEY DISEASE, ON CHRONIC DIALYSIS: ICD-10-CM

## 2024-05-07 DIAGNOSIS — Z00.00 HEALTH CARE MAINTENANCE: ICD-10-CM

## 2024-05-07 DIAGNOSIS — D63.1 ANEMIA DUE TO CHRONIC KIDNEY DISEASE, ON CHRONIC DIALYSIS: ICD-10-CM

## 2024-05-07 PROCEDURE — 1126F AMNT PAIN NOTED NONE PRSNT: CPT | Performed by: NURSE PRACTITIONER

## 2024-05-07 PROCEDURE — 1160F RVW MEDS BY RX/DR IN RCRD: CPT | Performed by: NURSE PRACTITIONER

## 2024-05-07 PROCEDURE — G0439 PPPS, SUBSEQ VISIT: HCPCS | Performed by: NURSE PRACTITIONER

## 2024-05-07 PROCEDURE — 1159F MED LIST DOCD IN RCRD: CPT | Performed by: NURSE PRACTITIONER

## 2024-05-07 PROCEDURE — 3075F SYST BP GE 130 - 139MM HG: CPT | Performed by: NURSE PRACTITIONER

## 2024-05-07 PROCEDURE — 99213 OFFICE O/P EST LOW 20 MIN: CPT | Performed by: NURSE PRACTITIONER

## 2024-05-07 PROCEDURE — 3080F DIAST BP >= 90 MM HG: CPT | Performed by: NURSE PRACTITIONER

## 2024-05-07 RX ORDER — AMLODIPINE BESYLATE 10 MG/1
1 TABLET ORAL DAILY
COMMUNITY
Start: 2024-05-01

## 2024-05-07 RX ORDER — LISINOPRIL 20 MG/1
20 TABLET ORAL DAILY
Qty: 90 TABLET | Refills: 1 | Status: SHIPPED | OUTPATIENT
Start: 2024-05-07

## 2024-05-07 RX ORDER — HYDRALAZINE HYDROCHLORIDE 25 MG/1
1 TABLET, FILM COATED ORAL 3 TIMES DAILY
COMMUNITY
Start: 2024-05-01

## 2024-05-07 NOTE — PROGRESS NOTES
The ABCs of the Annual Wellness Visit  Subsequent Medicare Wellness Visit    Subjective      Dee Herrera is a 33 y.o. female who presents for a Subsequent Medicare Wellness Visit.    The following portions of the patient's history were reviewed and   updated as appropriate: allergies, current medications, past family history, past medical history, past social history, past surgical history, and problem list.    Compared to one year ago, the patient feels her physical   health is better.    Compared to one year ago, the patient feels her mental   health is better.    Recent Hospitalizations:  This patient has had a [facility] admission record on file within the last 365 days.    Current Medical Providers:  Patient Care Team:  Margarita Woods APRN as PCP - General (Nurse Practitioner)  Winnie Sanchez MD as Referring Physician (Obstetrics and Gynecology)  Norberto Almaraz MD PhD as Consulting Physician (Hematology and Oncology)  Lupis Thomas MD (Inactive) as Consulting Physician (Nephrology)  Hair Mckeon MD as Consulting Physician (Nephrology)  Juana Taylor MD as Consulting Physician (Cardiology)    Outpatient Medications Prior to Visit   Medication Sig Dispense Refill    amLODIPine (NORVASC) 10 MG tablet Take 1 tablet by mouth Daily.      aspirin 81 MG chewable tablet Chew 1 tablet Daily.      budesonide-formoterol (Symbicort) 160-4.5 MCG/ACT inhaler Inhale 2 puffs.      carvedilol (COREG) 25 MG tablet Take 1 tablet by mouth Every 12 (Twelve) Hours. 60 tablet 0    Diclofenac Sodium (VOLTAREN) 1 % gel gel Apply 4 g topically to the appropriate area as directed 3 (Three) Times a Day. 150 g 0    escitalopram (LEXAPRO) 10 MG tablet Take 1 tablet by mouth Daily.      hydrALAZINE (APRESOLINE) 25 MG tablet Take 1 tablet by mouth 3 (Three) Times a Day.      hydroxychloroquine (PLAQUENIL) 200 MG tablet Take 1 tablet by mouth Daily.      iron sucrose 100 mL.      lacosamide (VIMPAT) 100 MG  tablet tablet Take 1 tablet by mouth Every 12 (Twelve) Hours. 6 tablet 0    mycophenolate (CELLCEPT) 500 MG tablet Take 0.5 tablets by mouth Every 12 (Twelve) Hours. 30 tablet 0    oxyCODONE-acetaminophen (PERCOCET) 5-325 MG per tablet Take 1 tablet by mouth Every 4 (Four) Hours As Needed for Moderate Pain. 15 tablet 0    lisinopril (PRINIVIL,ZESTRIL) 20 MG tablet Take 1 tablet by mouth.      hydrALAZINE (APRESOLINE) 25 MG tablet Take 1 tablet by mouth Every 12 (Twelve) Hours. 60 tablet 0    polyethylene glycol (MIRALAX) 17 GM/SCOOP powder Take 17 g by mouth As Needed. (Patient not taking: Reported on 5/7/2024)       No facility-administered medications prior to visit.       Opioid medication/s are on active medication list.  and I have evaluated her active treatment plan and pain score trends (see table).  There were no vitals filed for this visit.  I have reviewed the chart for potential of high risk medication and harmful drug interactions in the elderly.          Aspirin is on active medication list. Aspirin use is indicated based on review of current medical condition/s. Pros and cons of this therapy have been discussed today. Benefits of this medication outweigh potential harm.  Patient has been encouraged to continue taking this medication.  .      Patient Active Problem List   Diagnosis    Vitamin D deficiency    Iron deficiency anemia    Morning stiffness of joints    Thrombocytopenia    Acute renal failure (ARF)    Hypertension secondary to other renal disorders    Pancytopenia    Hypoalbuminemia    Volume overload    Ear drainage right    T.T.P. syndrome    Systemic lupus erythematosus    Lupus nephritis, ISN/RPS class IV    Hypokalemia    Hypocalcemia    COVID-19    Hospital discharge follow-up    Stage 5 chronic kidney disease    Cardiac cirrhosis    Acute on chronic pancreatitis    Duodenitis    Regional enteritis of small bowel    Pericardial effusion    End stage renal disease on dialysis     "Hemodialysis status    Seizure disorder    Elevated liver function tests    C. difficile colitis    Anemia, chronic disease    Essential hypertension    Peritoneal dialysis catheter in place    Anemia due to chronic kidney disease, on chronic dialysis    Alternating constipation and diarrhea    Abnormal stress test    Hyponatremia    Poor appetite    Moderate malnutrition    Chronic diastolic CHF (congestive heart failure)    Aortic valve lesion    Severe aortic valve regurgitation    Dissecting ascending aortic aneurysm    History of CVA (cerebrovascular accident)    Internal jugular (IJ) vein thromboembolism, chronic    Acute heart failure with preserved ejection fraction (HFpEF)    Acute on chronic anemia    Symptomatic anemia    Weakness generalized    Bilateral low back pain    ESRD (end stage renal disease)    Thoracic ascending aortic aneurysm    Heart murmur    Upper abdominal pain    Pseudocyst of pancreas due to chronic pancreatitis     Advance Care Planning   Advance Care Planning     Advance Directive is on file.  ACP discussion was held with the patient during this visit. Patient has an advance directive in EMR which is still valid.      Objective    Vitals:    05/07/24 1434   BP: 136/90   BP Location: Right arm   Patient Position: Sitting   Cuff Size: Adult   Pulse: 79   Resp: 18   SpO2: 99%   Weight: 53.5 kg (118 lb)   Height: 167.6 cm (65.98\")     Estimated body mass index is 19.05 kg/m² as calculated from the following:    Height as of this encounter: 167.6 cm (65.98\").    Weight as of this encounter: 53.5 kg (118 lb).    BMI is within normal parameters. No other follow-up for BMI required.      Does the patient have evidence of cognitive impairment?   No            HEALTH RISK ASSESSMENT    Smoking Status:  Social History     Tobacco Use   Smoking Status Former    Current packs/day: 1.00    Average packs/day: 1 pack/day for 16.0 years (16.0 ttl pk-yrs)    Types: Cigarettes, Cigars    Start date: " 2008    Passive exposure: Never   Smokeless Tobacco Never   Tobacco Comments    Patient smoked black & mild cigars     Alcohol Consumption:  Social History     Substance and Sexual Activity   Alcohol Use Not Currently    Alcohol/week: 8.0 standard drinks of alcohol    Types: 2 Glasses of wine, 4 Shots of liquor, 2 Drinks containing 0.5 oz of alcohol per week    Comment: social; stopped drinking approx 2023     Fall Risk Screen:    IRINEO Fall Risk Assessment was completed, and patient is at LOW risk for falls.Assessment completed on:2024    Depression Screenin/7/2024     2:42 PM   PHQ-2/PHQ-9 Depression Screening   Little Interest or Pleasure in Doing Things 0-->not at all   Feeling Down, Depressed or Hopeless 0-->not at all   PHQ-9: Brief Depression Severity Measure Score 0       Health Habits and Functional and Cognitive Screenin/7/2024     3:27 PM   Functional & Cognitive Status   Do you have difficulty preparing food and eating? No   Do you have difficulty bathing yourself, getting dressed or grooming yourself? No   Do you have difficulty using the toilet? No   Do you have difficulty moving around from place to place? No   Do you have trouble with steps or getting out of a bed or a chair? Yes   Current Diet Limited Junk Food   Dental Exam Not up to date   Eye Exam Not up to date   Exercise (times per week) 3 times per week   Current Exercises Include Walking;House Cleaning;Treadmill   Do you need help using the phone?  No   Are you deaf or do you have serious difficulty hearing?  No   Do you need help to go to places out of walking distance? No   Do you need help shopping? No   Do you need help preparing meals?  No   Do you need help with housework?  No   Do you need help with laundry? No   Do you need help taking your medications? No   Do you need help managing money? No   Do you ever drive or ride in a car without wearing a seat belt? No   Have you felt unusual stress, anger or  loneliness in the last month? No   Who do you live with? Other   If you need help, do you have trouble finding someone available to you? No   Have you been bothered in the last four weeks by sexual problems? No   Do you have difficulty concentrating, remembering or making decisions? No       Age-appropriate Screening Schedule:  Refer to the list below for future screening recommendations based on patient's age, sex and/or medical conditions. Orders for these recommended tests are listed in the plan section. The patient has been provided with a written plan.    Health Maintenance   Topic Date Due    COVID-19 Vaccine (1) Never done    Pneumococcal Vaccine 0-64 (1 of 2 - PCV) Never done    TDAP/TD VACCINES (2 - Td or Tdap) 06/10/2019    ANNUAL WELLNESS VISIT  Never done    INFLUENZA VACCINE  08/01/2024    PAP SMEAR  05/17/2026    HEPATITIS C SCREENING  Completed    Hepatitis B  Completed                  CMS Preventative Services Quick Reference  Risk Factors Identified During Encounter:    Chronic Pain: She is followed by GI for chronic back pain.s/p pancreatitis.  Depression/Dysphoria: Current medication is effective, no change recommended.  Drug Use/Abuse Identified or Suspected:  She smokes marijuana occasionally.  She denies needing assistance with quitting.  Immunizations Discussed/Encouraged: Tdap, Prevnar 20 (Pneumococcal 20-valent conjugate), and COVID19 and patient declined.  Polypharmacy: Medication List reviewed and Medications are appropriate for patient.  Dental Screening Recommended: she has not had dental screening in over 1 year.  Recommend she call her insurance company to get name of dentist that is in her plan and schedule appointment for dental screening and she agreed.  Vision Screening Recommended: her last vision screening was over 1 year ago.  Recommended she call her eye specialist, [facility], to schedule vision screening and she agreed.    The above risks/problems have been discussed with  the patient.  Pertinent information has been shared with the patient in the After Visit Summary.    Diagnoses and all orders for this visit:    1. Encounter for subsequent annual wellness visit (AWV) in Medicare patient (Primary)    2. Hypertension secondary to other renal disorders  Assessment & Plan:  Hypertension is stable on Amlodipine 10mg daily, Carvedilol 25mg BID, Hydralazine 25mg BID and Lisinopril 20mg daily.  Continue current treatment plan.  Decrease table salt and foods high in salt.  Weight loss.  Increase activity daily.  Blood pressure will be re-assessed in 3 months.      Orders:  -     lisinopril (PRINIVIL,ZESTRIL) 20 MG tablet; Take 1 tablet by mouth Daily.  Dispense: 90 tablet; Refill: 1    3. End stage renal disease on dialysis  Assessment & Plan:  Dialysis T,Th,S.  Followed by Hair Mckeon MD, Nephrology.      4. Anemia due to chronic kidney disease, on chronic dialysis  Assessment & Plan:  Followed by Hair Mckeon MD, Nephrology.      5. Health care maintenance  Comments:  Use sunscreen w/sun expsure.  Working smoke/Carbon Monoxide detectors in home.  Wear seatbelt.  Annual Vision/Dental exam.  Increase activity 30min/day x 5 days        Follow Up:   Next Medicare Wellness visit to be scheduled in 1 year.      An After Visit Summary and PPPS were made available to the patient.

## 2024-05-15 NOTE — ASSESSMENT & PLAN NOTE
Hypertension is stable on Amlodipine 10mg daily, Carvedilol 25mg BID, Hydralazine 25mg BID and Lisinopril 20mg daily.  Continue current treatment plan.  Decrease table salt and foods high in salt.  Weight loss.  Increase activity daily.  Blood pressure will be re-assessed in 3 months.

## 2024-05-22 ENCOUNTER — TELEPHONE (OUTPATIENT)
Dept: FAMILY MEDICINE CLINIC | Facility: CLINIC | Age: 34
End: 2024-05-22
Payer: MEDICARE

## 2024-05-22 NOTE — TELEPHONE ENCOUNTER
Caller: Dee Herrera    Relationship: Self    Best call back number: [phone]    What form or medical record are you requesting: RETURN TO WORK LETTER    Who is requesting this form or medical record from you: WORK    How would you like to receive the form or medical records (pick-up, mail, fax): MYCHART  If pick-up, provide patient with address and location details    Timeframe paperwork needed: AS SOON AS POSSIBLE    Additional notes: PATIENT NEEDS A LETTER STATING SHE CAN RETURN TO WORK ON 5/27 OR 5/28/2024. THIS CAN BE PLACED IN HER MYCHART.

## 2024-06-10 NOTE — PROGRESS NOTES
"    Subjective:     Encounter Date:06/11/2024      Patient ID: Dee Herrera is a 34 y.o. female.    Chief Complaint:  History of Present Illness    This is a 34-year-old with ESRD on dialysis, hypertension, systemic lupus erythematosus, pericardial effusion, left ventricular hypertrophy, cirrhosis, who presents for follow up.      The patient reported that she was diagnosed with end-stage renal disease, systemic lupus, and hypertension all about 6 months ago in 7/2022.  She has been on dialysis since that time through a tunneled dialysis catheter.  She reported that she had been in good health until about a month prior to that admission.  At the time there were plans to proceed with AV fistula placement eventually.  She was also followed by rheumatology for her lupus and was on treatment with CellCept and prednisone.  There is also plans to resume hydroxychloroquine.  It was felt that her lupus was responsible for her renal disease.  She was also planning on undergoing renal transplant evaluation with [facility].       Since starting with dialysis she began to struggle with significant nausea and vomiting.  As part of her work-up she underwent a CT of the abdomen and pelvis in 11/2022 at [facility].  This reported findings concerning for duodenitis and small bowel enteritis, a large pericardial effusion, moderate right and small left pleural effusion, soft tissue anasarca, reactive bilateral inguinal lymphadenopathy, and reportedly findings consistent with \"cardiac cirrhosis\".    Based on these findings Dr. Corey recommended an echocardiogram and referral to our office.     I saw the patient initially in 2/2023.  She underwent an echocardiogram the same day which showed severe left ventricular hypertrophy, with homogeneously speckled and echo bright myocardium concerning for infiltrative cardiomyopathy such as amyloid versus due to longstanding renal disease, normal left ventricular " systolic function with an EF of 64%, longitudinal LV strain of -15%, mildly dilated left ventricle, mildly dilated left atrium, increased left atrial pressure, insufficient TR velocity for RVSP measurement, and a small to moderate pericardial effusion most prominent posteriorly with a small amount anteriorly without any evidence of tamponade.     At that office visit she was still Struggling with nausea and vomiting.  She reported ongoing but stable dyspnea on exertion.  She reported a couple of episodes of chest discomfort a couple months prior but nothing recent.  She denies any family history of heart disease.  At that office visit I recommended proceeding with a PYP scan to rule out amyloidosis as a cause of her left ventricular hypertrophy.  I also sent an SPEP for further evaluation.  UPEP was not ordered because the patient was not producing urine at the time.     Shortly after that office visit she was admitted to the hospital on 2/13/2023 with elevated blood pressures.  During that hospitalization she also reported symptoms of shortness of breath and chest heaviness.  She underwent a repeat echocardiogram on 2/14/2023 which showed normal left ventricular systolic function with an EF of 59%, severe concentric left ventricular hypertrophy, grade 2 diastolic dysfunction, and a moderate to large pericardial effusion that is was large posteriorly but no evidence of tamponade, and elevated left atrial pressure.  Since that the patient appeared to be loculated and there was no clinical evidence of hemodynamic compromise from the effusion no intervention was recommended.  Her blood pressures improved with titration of her medications.     Since that hospitalization the patient was evaluated [facility] at [facility].  They set her up for a stress test and a repeat echocardiogram.  These were performed on 3/13/2023.  Stress test showed evidence of inferolateral infarct with small  amount of sheila-infarct ischemia.  Echocardiogram continue to show normal left ventricular systolic function with an EF of 55 to 60%, severe concentric left ventricular hypertrophy, small posterior pericardial effusion, and moderate pulmonary hypertension with an right ventricular systolic pressure 47 mmHg.  The reports were sent to me and I plan to discuss it further with the patient's follow-up in March but unfortunately she missed that appointment.     She was transitioned to peritoneal dialysis following an admission in 4/2023.      When I saw her back for follow-up in 5/2023 we discussed proceeding with a right and left cardiac catheterization.  I also recommended proceeding with a PYP scan to rule out amyloidosis due to the finding of severe left ventricular hypertrophy.    She underwent a PYP scan on 6/30/2023 which showed no evidence of amyloidosis.    She underwent a cardiac catheterization on 6/14/2023 which showed normal coronary arteries and pulmonary pressures.    Patient was admitted to the hospital in 10/2023 with worsening shortness of breath.  Prior to that admission she was admitted to [facility] where she had undergone an echocardiogram in 9/2023 which showed mildly depressed left ventricular function with an EF of 40 to 45%, moderate to severe mitral valve regurgitation, moderate to severe aortic valve regurgitation, and mild tricuspid valve regurgitation.  Due to the significant difference in the findings between her echocardiogram and 9/2023 compared to 2/2020 3 repeat echocardiogram was performed.    Echocardiogram was performed on 10/27/2023 and showed mildly depressed left ventricular function with an EF of 46 to 50%, severe aortic valve regurgitation with a possible echodensity on the right coronary leaflet, moderate mitral valve regurgitation, grade 3 diastolic dysfunction, mildly elevated right ventricular systolic pressure 45 mmHg, and mildly enlarged ascending  aorta.  Her symptoms of shortness of breath improved with volume removal with dialysis.    The patient ultimately underwent a CHETNA on 10/30/2023 which showed severe aortic valve regurgitation, moderate dilatation of the aortic root, and evidence of an aortic root dissection.  A stat CT angiogram of the chest was subsequently performed which confirmed the finding of an ascending aortic aneurysm and dissection.  CT surgery was subsequently consulted who recommended proceeding with surgical repair.  She was transition to hemodialysis before undergoing surgery.    She underwent aortic dissection repair and proximal aortic replacement using a 26 mm Gelweave Dacron interposition graft and a valve sparing procedure configuration or Anselmo procedure on 11/3/2023 with Dr. Medina.  Postoperatively she did have issues with seizure activity and started on antiepileptics.  Further workup revealed evidence of a right MCA stroke unfortunately she was able to fully recover from.  On 11/6/2023 she became hemodynamically unstable and repeat echocardiogram showed evidence of cardiac tamponade which appeared to be secondary to pericardial thrombus anteriorly compressing the right ventricle.  She is medically was taken back to the OR and underwent exploration with clot removal and treatment of a small amount of bleeding from the suture line.  Postoperatively she also developed a subacute right IJ DVT.  Vascular surgery recommended anticoagulating him with 6 months of apixaban.  She had a repeat echocardiogram performed on 12/7/2023 which showed normal left ventricular function and wall motion with an EF of 64%, and normal function of her aortic valve.  She was finally discharged to a SNF on 12/7/2023.    It appears that the patient has been hospitalized at [facility] and here at [facility] for acute on chronic pancreatitis.  She has also been found to have a pseudocyst in her pancreas that was felt to be  due to her chronic pancreatitis.    She did see Dr. Medina during one such admission in 1/2024.  They were consulted because of concerns for clot around the aortic graft.  He recommended a 3-month follow-up with a repeat CT angiogram of the chest.  Fortunately does not appear that this follow-up appointment ever occurred.    This is her first office visit since then.  The patient has not been seen in our office since her surgery and hospitalization in December.  She contacted our office she wanted a letter saying it was okay for her to return to work.  The patient is hoping to return to working at [employer] where she works at the [employer].  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  She reports that she is getting bored staying at home and would like to get back to a routine.    Outside of her multiple hospitalizations over the last several months she reports that she is doing better overall.  She still struggles with seizures and indicates she has had a couple of seizures while at dialysis.  She denies any issues with her blood pressures.  She reports some chest pressure and upper chest only when she sneezes otherwise denies any chest discomfort.  She denies any shortness of breath, palpitations, near-syncope or syncope or lower extremity swelling.    She reports that she has been trying to exercise.  She has been doing his stationary bike and lifting some weights at the gym at her apartment complex.  She tries to do this about twice a week.  She still goes to dialysis on Tuesdays, Thursdays, and Saturdays.  She is being dialyzed through a tunneled catheter.  She indicates that they have discussed proceeding with fistula placement but the patient is hesitant to do so but she cannot remember what her hesitation was.  She is aware that there is an increased risk  of infection with a tunneled catheter.      Review of Systems   Constitutional: Negative for malaise/fatigue.   HENT:  Negative for hearing loss, hoarse voice, nosebleeds and sore throat.    Eyes:  Negative for pain.   Cardiovascular:  Negative for chest pain, claudication, cyanosis, dyspnea on exertion, irregular heartbeat, leg swelling, near-syncope, orthopnea, palpitations, paroxysmal nocturnal dyspnea and syncope.   Respiratory:  Negative for shortness of breath and snoring.    Endocrine: Negative for cold intolerance, heat intolerance, polydipsia, polyphagia and polyuria.   Skin:  Negative for itching and rash.   Musculoskeletal:  Negative for arthritis, falls, joint pain, joint swelling, muscle cramps, muscle weakness and myalgias.   Gastrointestinal:  Negative for constipation, diarrhea, dysphagia, heartburn, hematemesis, hematochezia, melena, nausea and vomiting.   Genitourinary:  Negative for frequency, hematuria and hesitancy.   Neurological:  Positive for headaches and seizures. Negative for excessive daytime sleepiness, dizziness, light-headedness, numbness and weakness.   Psychiatric/Behavioral:  Negative for depression. The patient is not nervous/anxious.          Current Outpatient Medications:     amLODIPine (NORVASC) 10 MG tablet, Take 1 tablet by mouth Daily., Disp: , Rfl:     aspirin 81 MG chewable tablet, Chew 1 tablet Daily., Disp: , Rfl:     budesonide-formoterol (Symbicort) 160-4.5 MCG/ACT inhaler, Inhale 2 puffs., Disp: , Rfl:     carvedilol (COREG) 25 MG tablet, Take 1 tablet by mouth Every 12 (Twelve) Hours., Disp: 60 tablet, Rfl: 0    Diclofenac Sodium (VOLTAREN) 1 % gel gel, Apply 4 g topically to the appropriate area as directed 3 (Three) Times a Day., Disp: 150 g, Rfl: 0    escitalopram (LEXAPRO) 10 MG tablet, Take 1 tablet by mouth Daily., Disp: , Rfl:     hydrALAZINE (APRESOLINE) 25 MG tablet, Take 1 tablet by mouth 3 (Three) Times a Day., Disp: , Rfl:     hydroxychloroquine  (PLAQUENIL) 200 MG tablet, Take 1 tablet by mouth Daily., Disp: , Rfl:     lacosamide (VIMPAT) 100 MG tablet tablet, Take 1 tablet by mouth Every 12 (Twelve) Hours., Disp: 6 tablet, Rfl: 0    lisinopril (PRINIVIL,ZESTRIL) 20 MG tablet, Take 1 tablet by mouth Daily., Disp: 90 tablet, Rfl: 1    mycophenolate (CELLCEPT) 500 MG tablet, Take 0.5 tablets by mouth Every 12 (Twelve) Hours., Disp: 30 tablet, Rfl: 0    oxyCODONE-acetaminophen (PERCOCET) 5-325 MG per tablet, Take 1 tablet by mouth Every 4 (Four) Hours As Needed for Moderate Pain., Disp: 15 tablet, Rfl: 0    Past Medical History:   Diagnosis Date    Anasarca     PER CT SCAN    Anxiety     CHF (congestive heart failure)     Dry skin     ESRD (end stage renal disease) on dialysis     TUES, THURS, [address]    History of abdominal pain     History of anemia     History of transfusion     Hypertension     Iron deficiency anemia 09/27/2021    Lupus (systemic lupus erythematosus) 07/30/2022    Migraine     Other specified nutritional anemias     Pancreatitis     Pericardial effusion     Renal insufficiency     Scoliosis     Seizures     STATES LAST WAS 1/2023    Shortness of breath     OCCASIONAL    Vitamin D deficiency 09/27/2021       Past Surgical History:   Procedure Laterality Date    ASCENDING AORTIC ANEURYSM REPAIR W/ MECHANICAL AORTIC VALVE REPLACEMENT N/A 11/2/2023    Procedure: CHETNA STERNOTOMY, AORTIC ROOT REPLACEMENT WITH VALVE SPARING MISHEL PROCEDURE, REPLACEMENT OF ASCENDING AORTA, RIGHT FEMORAL DIALYSIS CATHETER PLACEMENT AND PRP;  Surgeon: Chris Medina MD;  Location: [facility] CVOR;  Service: Cardiothoracic;  Laterality: N/A;    CARDIAC CATHETERIZATION N/A 06/14/2023    Procedure: Coronary angiography;  Surgeon: Juana Taylor MD;  Location: [facility] CATH INVASIVE LOCATION;  Service: Cardiovascular;  Laterality: N/A;    CARDIAC CATHETERIZATION N/A 06/14/2023    Procedure: Left heart cath;  Surgeon: Juana Taylor MD;  Location: [facility]  CATH INVASIVE LOCATION;  Service: Cardiovascular;  Laterality: N/A;    CARDIAC CATHETERIZATION N/A 06/14/2023    Procedure: Right Heart Cath;  Surgeon: Juana Taylor MD;  Location: [facility] CATH INVASIVE LOCATION;  Service: Cardiovascular;  Laterality: N/A;    COLONOSCOPY N/A 7/20/2023    Procedure: COLONOSCOPY to cecum with biopsy;  Surgeon: Drew Kaminski MD;  Location: [facility] ENDOSCOPY;  Service: Gastroenterology;  Laterality: N/A;  PRE - diarrhea, constipation  POST - fair prep, normal    CORONARY ARTERY BYPASS GRAFT N/A 11/6/2023    Procedure: STERNAL EXPLORATION AND WASH OUT;  Surgeon: Jr Mitesh Quiroz MD;  Location: [facility] CVOR;  Service: Cardiothoracic;  Laterality: N/A;    ENDOSCOPY N/A 7/20/2023    Procedure: ESOPHAGOGASTRODUODENOSCOPY with biopsy;  Surgeon: Drew Kaminski MD;  Location: [facility] ENDOSCOPY;  Service: Gastroenterology;  Laterality: N/A;  PRE - abn ct abd  POST - gastritis    INSERTION HEMODIALYSIS CATHETER N/A 07/26/2022    Procedure: RIGHT TUNNELED DIALYSIS CATHETER PLACEMENT;  Surgeon: Diandra Adhikari MD;  Location: [facility] MAIN OR;  Service: Vascular;  Laterality: N/A;    INSERTION HEMODIALYSIS CATHETER Left 11/29/2023    Procedure: TUNNELED DIALYSIS CATHETER PLACEMENT;  Surgeon: Jose Patel II, MD;  Location: [facility] MAIN OR;  Service: Vascular;  Laterality: Left;    INSERTION PERITONEAL DIALYSIS CATHETER N/A 04/03/2023    Procedure: INSERTION PERITONEAL DIALYSIS CATHETER LAPAROSCOPIC, omentumpexy;  Surgeon: Jemal Loyola MD;  Location: [facility] MAIN OR;  Service: General;  Laterality: N/A;    REMOVAL PERITONEAL DIALYSIS CATHETER N/A 12/1/2023    Procedure: REMOVAL PERITONEAL DIALYSIS CATHETER;  Surgeon: Maryellen Ashton MD;  Location: [facility] MAIN OR;  Service: General;  Laterality: N/A;    TONSILLECTOMY         Family History   Problem Relation Age of Onset    Autoimmune disease Mother     Anemia Mother     Diabetes Sister     Anemia Brother     Diabetes Maternal  "Grandmother     Hypertension Maternal Grandmother     Cancer Maternal Grandmother     Sickle cell anemia Cousin     [place] Hyperthermia Neg Hx        Social History     Tobacco Use    Smoking status: Former     Current packs/day: 1.00     Average packs/day: 1 pack/day for 16.1 years (16.1 ttl pk-yrs)     Types: Cigarettes, Cigars     Start date: 5/7/2008     Passive exposure: Never    Smokeless tobacco: Never    Tobacco comments:     Patient smoked black & mild cigars   Vaping Use    Vaping status: Never Used   Substance Use Topics    Alcohol use: Not Currently     Alcohol/week: 8.0 standard drinks of alcohol     Types: 2 Glasses of wine, 4 Shots of liquor, 2 Drinks containing 0.5 oz of alcohol per week     Comment: social; stopped drinking approx 5/2023    Drug use: Yes     Types: Marijuana     Comment: OCCASIONAL         ECG 12 Lead    Date/Time: 6/11/2024 11:12 AM  Performed by: Juana Taylor MD    Authorized by: Juana Taylor MD  Comparison: compared with previous ECG   Similar to previous ECG  Rhythm: sinus rhythm  Other findings: left ventricular hypertrophy with strain             Objective:     Visit Vitals  /70 (BP Location: Left arm, Patient Position: Sitting, Cuff Size: Adult)   Ht 167.6 cm (65.98\")   Wt 49.7 kg (109 lb 9.6 oz)   SpO2 97%   BMI 17.70 kg/m²         Constitutional:       Appearance: Normal appearance. Well-developed.   Eyes:      General: Lids are normal.      Conjunctiva/sclera: Conjunctivae normal.      Pupils: Pupils are equal, round, and reactive to light.   HENT:      Head: Normocephalic and atraumatic.   Neck:      Vascular: No carotid bruit or JVD.      Lymphadenopathy: No cervical adenopathy.   Pulmonary:      Effort: Pulmonary effort is normal.      Breath sounds: Normal breath sounds.   Cardiovascular:      Normal rate. Regular rhythm.      No gallop.    Pulses:     Radial: 2+ bilaterally.  Edema:     Peripheral edema absent.   Abdominal:      Palpations: Abdomen is " soft.   Musculoskeletal:      Cervical back: Full passive range of motion without pain, normal range of motion and neck supple. Skin:     General: Skin is warm and dry.   Neurological:      Mental Status: Alert and oriented to person, place, and time.             Assessment:          Diagnosis Plan   1. S/P ascending aortic aneurysm repair        2. Dissecting ascending aortic aneurysm        3. Ascending aortic aneurysm, unspecified whether ruptured        4. Severe aortic valve regurgitation        5. Essential hypertension        6. Chronic diastolic CHF (congestive heart failure)        7. End stage renal disease on dialysis        8. Other systemic lupus erythematosus with glomerular disease        9. History of CVA (cerebrovascular accident)        10. Seizure disorder        11. 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  Ascending aortic aneurysm with dissection status post repair.  CT of the abdomen pelvis in January showed findings concerning for a clot around her graft.  At that time was recommended that she follow-up with Dr. Medina in 3 months with a repeat CT unfortunately has not occurred.  Will reach out Dr. Medina's office and ensure that follow-up is scheduled.  2.  Aortic valve regurgitation status post repair.  Due to #1.  She had normal function of her valve on a repeat echocardiogram postoperatively in 12/2023.  Will plan on a repeat echocardiogram at her follow-up in 6 months.  3.  Hypertension.  Well-controlled on current regimen medications.  4.  Chronic diastolic congestive heart failure.  No evidence of volume overload.  This is managed by dialysis.  5.  ESRD.  Due to lupus nephritis.  On hemodialysis through tunneled catheter.  6.  Seizure disorder.  May be due to postoperative stroke.  She remains on Vimpat.  7.  History of MCA stroke.  Occurred postoperatively after her aneurysm and dissection repair.  No other residual deficits.  8.  Chronic  pancreatitis.  With pseudocyst.  9.  Systemic lupus erythematosus.  On mycophenolate and hydroxychloroquine.    Will plan on seeing the patient back again in 6 months with a repeat echocardiogram.

## 2024-06-11 ENCOUNTER — OFFICE VISIT (OUTPATIENT)
Age: 34
End: 2024-06-11
Payer: MEDICARE

## 2024-06-11 VITALS
DIASTOLIC BLOOD PRESSURE: 70 MMHG | HEIGHT: 66 IN | SYSTOLIC BLOOD PRESSURE: 110 MMHG | OXYGEN SATURATION: 97 % | BODY MASS INDEX: 17.61 KG/M2 | WEIGHT: 109.6 LBS

## 2024-06-11 DIAGNOSIS — G40.909 SEIZURE DISORDER: ICD-10-CM

## 2024-06-11 DIAGNOSIS — I35.1 SEVERE AORTIC VALVE REGURGITATION: ICD-10-CM

## 2024-06-11 DIAGNOSIS — Z99.2 END STAGE RENAL DISEASE ON DIALYSIS: ICD-10-CM

## 2024-06-11 DIAGNOSIS — I50.32 CHRONIC DIASTOLIC CHF (CONGESTIVE HEART FAILURE): ICD-10-CM

## 2024-06-11 DIAGNOSIS — K86.1 PSEUDOCYST OF PANCREAS DUE TO CHRONIC PANCREATITIS: ICD-10-CM

## 2024-06-11 DIAGNOSIS — K86.3 PSEUDOCYST OF PANCREAS DUE TO CHRONIC PANCREATITIS: ICD-10-CM

## 2024-06-11 DIAGNOSIS — I71.21 ASCENDING AORTIC ANEURYSM, UNSPECIFIED WHETHER RUPTURED: ICD-10-CM

## 2024-06-11 DIAGNOSIS — Z98.890 S/P ASCENDING AORTIC ANEURYSM REPAIR: Primary | ICD-10-CM

## 2024-06-11 DIAGNOSIS — N18.6 END STAGE RENAL DISEASE ON DIALYSIS: ICD-10-CM

## 2024-06-11 DIAGNOSIS — I10 ESSENTIAL HYPERTENSION: ICD-10-CM

## 2024-06-11 DIAGNOSIS — M32.14 OTHER SYSTEMIC LUPUS ERYTHEMATOSUS WITH GLOMERULAR DISEASE: ICD-10-CM

## 2024-06-11 DIAGNOSIS — I71.010 DISSECTING ASCENDING AORTIC ANEURYSM: ICD-10-CM

## 2024-06-11 DIAGNOSIS — Z86.73 HISTORY OF CVA (CEREBROVASCULAR ACCIDENT): ICD-10-CM

## 2024-06-11 DIAGNOSIS — Z86.79 S/P ASCENDING AORTIC ANEURYSM REPAIR: Primary | ICD-10-CM

## 2024-06-11 PROBLEM — I50.31 ACUTE HEART FAILURE WITH PRESERVED EJECTION FRACTION (HFPEF): Status: RESOLVED | Noted: 2023-12-09 | Resolved: 2024-06-11

## 2024-06-11 PROBLEM — N17.9 ACUTE RENAL FAILURE (ARF): Status: RESOLVED | Noted: 2022-07-20 | Resolved: 2024-06-11

## 2024-06-11 PROBLEM — R94.39 ABNORMAL STRESS TEST: Status: RESOLVED | Noted: 2023-05-26 | Resolved: 2024-06-11

## 2024-06-11 PROBLEM — I35.8 AORTIC VALVE MASS: Status: RESOLVED | Noted: 2023-10-28 | Resolved: 2024-06-11

## 2024-06-11 PROBLEM — I15.1 HYPERTENSION SECONDARY TO OTHER RENAL DISORDERS: Status: RESOLVED | Noted: 2022-07-20 | Resolved: 2024-06-11

## 2024-06-11 PROBLEM — R01.1 HEART MURMUR: Status: RESOLVED | Noted: 2024-02-17 | Resolved: 2024-06-11

## 2024-06-11 NOTE — LETTER
June 11, 2024       No Recipients    Patient: Dee Herrera   YOB: 1990   Date of Visit: 6/11/2024       Dear Dee Herrera,    Dee Herrera was in my office today. Below are the relevant portions of my assessment and plan of care.           If you have questions, please do not hesitate to call me. I look forward to following Dee along with you.         Sincerely,        Juana Taylor MD        CC:   No Recipients

## 2024-06-27 ENCOUNTER — HOSPITAL ENCOUNTER (OUTPATIENT)
Dept: CARDIOLOGY | Facility: HOSPITAL | Age: 34
Discharge: HOME | End: 2024-06-27
Admitting: INTERNAL MEDICINE
Payer: MEDICARE

## 2024-06-27 VITALS
WEIGHT: 109 LBS | HEIGHT: 66 IN | BODY MASS INDEX: 17.52 KG/M2 | SYSTOLIC BLOOD PRESSURE: 122 MMHG | HEART RATE: 70 BPM | DIASTOLIC BLOOD PRESSURE: 80 MMHG

## 2024-06-27 DIAGNOSIS — I35.1 SEVERE AORTIC VALVE REGURGITATION: ICD-10-CM

## 2024-06-27 DIAGNOSIS — I71.21 ASCENDING AORTIC ANEURYSM, UNSPECIFIED WHETHER RUPTURED: ICD-10-CM

## 2024-06-27 DIAGNOSIS — I10 ESSENTIAL HYPERTENSION: ICD-10-CM

## 2024-06-27 DIAGNOSIS — I71.010 DISSECTING ASCENDING AORTIC ANEURYSM: ICD-10-CM

## 2024-06-27 DIAGNOSIS — Z86.79 S/P ASCENDING AORTIC ANEURYSM REPAIR: ICD-10-CM

## 2024-06-27 DIAGNOSIS — Z98.890 S/P ASCENDING AORTIC ANEURYSM REPAIR: ICD-10-CM

## 2024-06-27 LAB
AORTIC DIMENSIONLESS INDEX: 0.6 (DI)
BH CV ECHO LEFT VENTRICLE GLOBAL LONGITUDINAL STRAIN: -19.8 %
BH CV ECHO MEAS - ACS: 1.99 CM
BH CV ECHO MEAS - AI P1/2T: 541.3 MSEC
BH CV ECHO MEAS - AO MAX PG: 20.5 MMHG
BH CV ECHO MEAS - AO MEAN PG: 12.2 MMHG
BH CV ECHO MEAS - AO ROOT DIAM: 3.3 CM
BH CV ECHO MEAS - AO V2 MAX: 226.2 CM/SEC
BH CV ECHO MEAS - AO V2 VTI: 46.4 CM
BH CV ECHO MEAS - AVA(I,D): 1.89 CM2
BH CV ECHO MEAS - EDV(CUBED): 152.5 ML
BH CV ECHO MEAS - EDV(MOD-SP2): 119 ML
BH CV ECHO MEAS - EDV(MOD-SP4): 142 ML
BH CV ECHO MEAS - EF(MOD-BP): 62.7 %
BH CV ECHO MEAS - EF(MOD-SP2): 64.7 %
BH CV ECHO MEAS - EF(MOD-SP4): 62.7 %
BH CV ECHO MEAS - ESV(CUBED): 51.6 ML
BH CV ECHO MEAS - ESV(MOD-SP2): 42 ML
BH CV ECHO MEAS - ESV(MOD-SP4): 53 ML
BH CV ECHO MEAS - FS: 30.3 %
BH CV ECHO MEAS - IVS/LVPW: 1.07 CM
BH CV ECHO MEAS - IVSD: 0.92 CM
BH CV ECHO MEAS - LAT PEAK E' VEL: 8.5 CM/SEC
BH CV ECHO MEAS - LV DIASTOLIC VOL/BSA (35-75): 91.9 CM2
BH CV ECHO MEAS - LV MASS(C)D: 175.6 GRAMS
BH CV ECHO MEAS - LV MAX PG: 6.9 MMHG
BH CV ECHO MEAS - LV MEAN PG: 4.1 MMHG
BH CV ECHO MEAS - LV SYSTOLIC VOL/BSA (12-30): 34.3 CM2
BH CV ECHO MEAS - LV V1 MAX: 131 CM/SEC
BH CV ECHO MEAS - LV V1 VTI: 27.1 CM
BH CV ECHO MEAS - LVIDD: 5.3 CM
BH CV ECHO MEAS - LVIDS: 3.7 CM
BH CV ECHO MEAS - LVOT AREA: 3.2 CM2
BH CV ECHO MEAS - LVOT DIAM: 2.03 CM
BH CV ECHO MEAS - LVPWD: 0.87 CM
BH CV ECHO MEAS - MED PEAK E' VEL: 6.7 CM/SEC
BH CV ECHO MEAS - MR MAX PG: 99.8 MMHG
BH CV ECHO MEAS - MR MAX VEL: 499.5 CM/SEC
BH CV ECHO MEAS - MV A DUR: 0.13 SEC
BH CV ECHO MEAS - MV A MAX VEL: 64.3 CM/SEC
BH CV ECHO MEAS - MV DEC SLOPE: 726.8 CM/SEC2
BH CV ECHO MEAS - MV DEC TIME: 0.15 SEC
BH CV ECHO MEAS - MV E MAX VEL: 70.4 CM/SEC
BH CV ECHO MEAS - MV E/A: 1.1
BH CV ECHO MEAS - MV MAX PG: 3.7 MMHG
BH CV ECHO MEAS - MV MEAN PG: 1.21 MMHG
BH CV ECHO MEAS - MV P1/2T: 40.3 MSEC
BH CV ECHO MEAS - MV V2 VTI: 24.5 CM
BH CV ECHO MEAS - MVA(P1/2T): 5.5 CM2
BH CV ECHO MEAS - MVA(VTI): 3.6 CM2
BH CV ECHO MEAS - PA V2 MAX: 115.4 CM/SEC
BH CV ECHO MEAS - PULM A REVS DUR: 0.1 SEC
BH CV ECHO MEAS - PULM A REVS VEL: 39.8 CM/SEC
BH CV ECHO MEAS - PULM DIAS VEL: 38.1 CM/SEC
BH CV ECHO MEAS - PULM S/D: 1.22
BH CV ECHO MEAS - PULM SYS VEL: 46.7 CM/SEC
BH CV ECHO MEAS - RAP SYSTOLE: 3 MMHG
BH CV ECHO MEAS - RV MAX PG: 2.19 MMHG
BH CV ECHO MEAS - RV V1 MAX: 74.1 CM/SEC
BH CV ECHO MEAS - RV V1 VTI: 18.1 CM
BH CV ECHO MEAS - RVSP: 17.6 MMHG
BH CV ECHO MEAS - SV(LVOT): 87.6 ML
BH CV ECHO MEAS - SV(MOD-SP2): 77 ML
BH CV ECHO MEAS - SV(MOD-SP4): 89 ML
BH CV ECHO MEAS - SVI(LVOT): 56.7 ML/M2
BH CV ECHO MEAS - SVI(MOD-SP2): 49.8 ML/M2
BH CV ECHO MEAS - SVI(MOD-SP4): 57.6 ML/M2
BH CV ECHO MEAS - TR MAX PG: 14.6 MMHG
BH CV ECHO MEAS - TR MAX VEL: 191.1 CM/SEC
BH CV ECHO MEASUREMENTS AVERAGE E/E' RATIO: 9.26
BH CV XLRA - RV BASE: 3 CM
BH CV XLRA - RV LENGTH: 6.8 CM
BH CV XLRA - RV MID: 1.9 CM
BH CV XLRA - TDI S': 9.2 CM/SEC
LEFT ATRIUM VOLUME INDEX: 63.5 ML/M2
SINUS: 2.4 CM

## 2024-06-27 PROCEDURE — 93356 MYOCRD STRAIN IMG SPCKL TRCK: CPT

## 2024-06-27 PROCEDURE — 93306 TTE W/DOPPLER COMPLETE: CPT

## 2024-06-27 PROCEDURE — 93306 TTE W/DOPPLER COMPLETE: CPT | Performed by: INTERNAL MEDICINE

## 2024-06-27 PROCEDURE — 93356 MYOCRD STRAIN IMG SPCKL TRCK: CPT | Performed by: INTERNAL MEDICINE

## 2024-06-28 NOTE — PROGRESS NOTES
Called and discussed echocardiogram results with the patient.  Will schedule her for routine 6-month follow-up.

## 2024-07-22 ENCOUNTER — HOSPITAL ENCOUNTER (OUTPATIENT)
Dept: CT IMAGING | Facility: HOSPITAL | Age: 34
Discharge: HOME | End: 2024-07-22
Payer: MEDICARE

## 2024-10-31 NOTE — PLAN OF CARE
Goal Outcome Evaluation:   Patient alert follows commands h/d finsihed with 1 unit of blood transfused during dialysis. Patient tolerated dialysis paperwork placed in chart by dialysis nurse. No c/o pain or nausea noted at this time. No acute distress noted will continue to monitor                     
Home

## 2024-12-23 NOTE — PROGRESS NOTES
[facility] Progress Note      Patient Name: Dee Herrera  : 1990  MRN: [id number]  Primary Care Physician:  Margarita Woods APRN  Date of admission: 10/26/2023    Subjective     Interval History:   Follow-up end-stage renal disease, patient was on peritoneal dialysis but switched to hemodialysis in the hospital    The patient was started on PD complaining of having fullness, getting nocturnal tube feed fluid being drained this morning appears to be very clear.  Her temporary dialysis catheter was removed.  Denies chest pain or shortness of air, no nausea or vomiting        Review of Systems:   As noted above    Objective     Vitals:   Temp:  [98.6 °F (37 °C)-100.9 °F (38.3 °C)] 99.6 °F (37.6 °C)  Heart Rate:  [86-96] 86  Resp:  [16] 16  BP: (102-138)/(73-98) 122/73  Flow (L/min):  [3-33] 33    Intake/Output Summary (Last 24 hours) at 11/15/2023 0859  Last data filed at 11/15/2023 0001  Gross per 24 hour   Intake 2323 ml   Output --   Net 2323 ml       Physical Exam:    General Appearance: Awake, alert, chronically ill and frail  Skin: warm and dry  HEENT: Core track in place  Neck: No JVD  Lungs: Scattered rhonchi, unlabored breathing effort  Heart: RRR, faint rub  Abdomen: soft, no guarding nondistended, normoactive bowels and PD catheter in place with clean exit site  Extremities: no edema.  Neuro: Moving all extremities    Scheduled Meds:     amLODIPine, 10 mg, Oral, Q24H  [Held by provider] aspirin, 81 mg, Oral, Daily  carvedilol, 25 mg, Oral, Q12H  chlorhexidine, 15 mL, Mouth/Throat, Q12H  escitalopram, 10 mg, Oral, Daily  First Mouthwash (Magic Mouthwash), 10 mL, Swish & Spit, Q6H  [Held by provider] heparin (porcine), 5,000 Units, Subcutaneous, Q8H  insulin regular, 2-7 Units, Subcutaneous, Q6H  Lacosamide, 50 mg, Intravenous, Q12H  levETIRAcetam, 250 mg, Intravenous, Q12H  losartan, 25 mg, Oral, Q24H  mupirocin, , Each Nare, BID  pantoprazole, 40 mg, Intravenous, Q  AM  UltraBag/Dianeal/1.5% Dextrose low-elen (lucia #[id number]), 2,000 mL, Intraperitoneal, 4 Exchanges Daily - Dwell Overnight      IV Meds:   sodium chloride, 30 mL/hr, Last Rate: 30 mL/hr (11/02/23 1400)      Results Reviewed:   I have personally reviewed the results from the time of this admission to 11/15/2023 08:59 EST     Results from last 7 days   Lab Units 11/15/23  0239 11/14/23  0308 11/13/23  0258 11/10/23  0311 11/09/23  0306   SODIUM mmol/L 130* 134* 133*   < > 137   POTASSIUM mmol/L 3.3* 3.7 3.5   < > 3.5   CHLORIDE mmol/L 98 100 99   < > 105   CO2 mmol/L 20.7* 24.0 22.0   < > 22.2   BUN mg/dL 26* 21* 32*   < > 29*   CREATININE mg/dL 4.03* 3.31* 4.34*   < > 4.58*   CALCIUM mg/dL 8.8 8.9 8.5*   < > 8.7   BILIRUBIN mg/dL  --  0.4  --   --  0.3   ALK PHOS U/L  --  73  --   --  67   ALT (SGPT) U/L  --  11  --   --  <5   AST (SGOT) U/L  --  28  --   --  21   GLUCOSE mg/dL 112* 124* 118*   < > 114*    < > = values in this interval not displayed.       Estimated Creatinine Clearance: 17.1 mL/min (A) (by C-G formula based on SCr of 4.03 mg/dL (H)).    Results from last 7 days   Lab Units 11/15/23  0239 11/14/23  0308 11/12/23  0359 11/11/23  1643 11/11/23 0309   MAGNESIUM mg/dL 1.7 1.7  --   --  1.9   PHOSPHORUS mg/dL 2.7 2.0* 3.1   < > 1.4*    < > = values in this interval not displayed.             Results from last 7 days   Lab Units 11/15/23  0239 11/14/23  0308 11/13/23  0811 11/12/23  0359 11/11/23  0309   WBC 10*3/mm3 10.62 9.31 10.66 11.93* 9.87   HEMOGLOBIN g/dL 8.2* 7.8* 8.0* 8.0* 8.5*   PLATELETS 10*3/mm3 118* 120* 100* 93* 86*               Assessment / Plan     ASSESSMENT:  End-stage renal disease secondary to lupus nephritis who was on peritoneal dialysis, he was switched temporarily to hemodialysis postoperatively and temporary dialysis catheter was removed yesterday and PD was resumed.  There is question about feeling full but the patient is not expressing any complaints at this  hyponatremia,  associate with her ESRD sodium down to 130  Cardiomyopathy ejection fraction about 40%  Thrombocytopenia, platelet today is 118,000  Anemia of chronic kidney disease hemoglobin today is 8.2, her ferritin is very high related to inflammation and has other parameters suggestive of iron deficiency and also chronic disease  SLE  Mitral and aortic valve insufficiency with DeBakey type I dissection which is felt to be either subacute or chronic, she underwent repair.  She then developed cardiac tamponade and had reexploration done with removal of large clot compressing the right ventricle  Seizure disorder  Hypertension with ESRD, stable    PLAN:  Continue the same treatment  Change the dialysate to 2.5% solution 5 exchanges per day  Restrict fluid intake to 1000 cc per 24 hours  Will not give iron nor YULIANA since it will be ineffective in the presence of inflammation  Surveillance labs    I reviewed the chart and other providers note, I reviewed imaging and lab data.  Copied text in this note has been reviewed and is accurate as of 11/15/23.     Thank you for involving us in the care of Dee Herrera.  Please feel free to call with any questions.    Isaac Diaz MD  11/15/23  08:59 [facility]    [facility]  [phone]    Please note that portions of this note were completed with a voice recognition program.   No

## 2025-03-17 NOTE — PLAN OF CARE
Goal Outcome Evaluation:            Pt is alert and oriented. On RA. Pt made complaints of bilateral foot pain received Tramadol for pain. Pt received peritoneal dialysis x 3. Pt tolerated well. Pt VSS. No acute distress at this time. Plan of care is ongoing.               Negative

## (undated) DEVICE — PK PERFUS CUST W/CARDIOPLEGIA

## (undated) DEVICE — DECANTER BAG 9": Brand: MEDLINE INDUSTRIES, INC.

## (undated) DEVICE — DRSNG SURESITE WNDW 2.38X2.75

## (undated) DEVICE — SYS VASOVIEW HEMOPRO ENDOSCOPIC HARVST VESL

## (undated) DEVICE — GLV SURG BIOGEL LTX PF 6

## (undated) DEVICE — GLV SURG SENSICARE POLYISPRN W/ALOE PF LF 6.5 GRN STRL

## (undated) DEVICE — GW EMR FIX EXCHG J STD .035 3MM 260CM

## (undated) DEVICE — ADAPT CLN BIOGUARD AIR/H2O DISP

## (undated) DEVICE — LOU LAP CHOLE: Brand: MEDLINE INDUSTRIES, INC.

## (undated) DEVICE — SUT SILK 2 SUTUPAK TIE 60IN SA8H 2STRAND

## (undated) DEVICE — ST TOURNI COMPL A/ 7IN

## (undated) DEVICE — Device

## (undated) DEVICE — DRSNG WND GZ PAD BORDERED 4X8IN STRL

## (undated) DEVICE — GLV SURG BIOGEL M LTX PF 6 1/2

## (undated) DEVICE — PK SUT OPN HEART POLLOCK CUST

## (undated) DEVICE — DRSNG SURESITE WNDW 4X4.5

## (undated) DEVICE — ANTIBACTERIAL UNDYED BRAIDED (POLYGLACTIN 910), SYNTHETIC ABSORBABLE SUTURE: Brand: COATED VICRYL

## (undated) DEVICE — SUT VIC 0 CT1 CR8 27IN JJ41G

## (undated) DEVICE — ST. SORBAVIEW ULTIMATE IJ SYSTEM A,C: Brand: CENTURION

## (undated) DEVICE — ENDOPATH XCEL UNIVERSAL TROCAR STABLILITY SLEEVES: Brand: ENDOPATH XCEL

## (undated) DEVICE — CORONARY ARTERY BYPASS GRAFT MARKERS, STAINLESS STEEL, DISTAL, WITHOUT HOLDER: Brand: ANASTOMARK CORONARY ARTERY BYPASS GRAFT MARKERS, STAINLESS STEEL, DISTAL

## (undated) DEVICE — ORGANIZER SUT SHELIGH 3T 213013

## (undated) DEVICE — SYR LUERLOK 5CC

## (undated) DEVICE — DISPOSABLE MONOPOLAR ENDOSCOPIC CORD 10 FT. (3M): Brand: KIRWAN

## (undated) DEVICE — ST ACC MICROPUNCTURE STFF .018 ECHO/PLDM/TP 4F/10CM 21G/7CM

## (undated) DEVICE — HARMONIC SYNERGY DISSECTING HOOK WITH TORQUE WRENCH. FOR USE WITH BLUE HAND PIECE ONLY: Brand: HARMONIC SYNERGY

## (undated) DEVICE — SUT SILK 0 CT1 CR8 18IN C021D

## (undated) DEVICE — GLV SURG BIOGEL M LTX PF 7 1/2

## (undated) DEVICE — SPNG GZ WOVN 4X4IN 12PLY 10/BX STRL

## (undated) DEVICE — KT MANIFLD CARDIAC

## (undated) DEVICE — TRAP FLD MINIVAC MEGADYNE 100ML

## (undated) DEVICE — DRN WND CH RND FUL/FLUT NO/TROC 3/8IN 28F

## (undated) DEVICE — PK CATH CARD 40

## (undated) DEVICE — TROCAR SITE CLOSURE DEVICE: Brand: ENDO CLOSE

## (undated) DEVICE — ST ACC MICROPUNCTURE STFF .018 ECHO/PLAT/TP 4F/10CM 21G/7CM

## (undated) DEVICE — SUT PROLN 0 CT1 30IN 8424H

## (undated) DEVICE — CANN O2 ETCO2 FITS ALL CONN CO2 SMPL A/ 7IN DISP LF

## (undated) DEVICE — TR BAND RADIAL ARTERY COMPRESSION DEVICE: Brand: TR BAND

## (undated) DEVICE — NDL HYPO PRECISIONGLIDE REG 25G 1 1/2

## (undated) DEVICE — CONN TBG Y 6 IN 1 LF STRL

## (undated) DEVICE — BALN PRESS WEDGE 5F 110CM

## (undated) DEVICE — PREP SOL POVIDONE/IODINE BT 4OZ

## (undated) DEVICE — SUT PROLN 6/0 RB2 D/A 30IN 8711H

## (undated) DEVICE — KT CLN CLEANOR SCPE

## (undated) DEVICE — CVR PROB 96IN LF STRL

## (undated) DEVICE — MEDICINE CUP, GRADUATED, STER: Brand: MEDLINE

## (undated) DEVICE — SENSR O2 OXIMAX FNGR A/ 18IN NONSTR

## (undated) DEVICE — SOL NACL 0.9PCT 1000ML

## (undated) DEVICE — CATH VENT DLP W/CONN MALL NOVNT SILICON 16FR 16IN

## (undated) DEVICE — SOL IRR H2O BTL 1000ML STRL

## (undated) DEVICE — KT ORCA ORCAPOD DISP STRL

## (undated) DEVICE — ST IRR CYSTO W/SPK 77IN LF

## (undated) DEVICE — HEMOCONCENTRATOR PERFUS LPS06

## (undated) DEVICE — BIOPATCH™ ANTIMICROBIAL DRESSING WITH CHLORHEXIDINE GLUCONATE IS A HYDROPHILLIC POLYURETHANE ABSORPTIVE FOAM WITH CHLORHEXIDINE GLUCONATE (CHG) WHICH INHIBITS BACTERIAL GROWTH UNDER THE DRESSING. THE DRESSING IS INTENDED TO BE USED TO ABSORB EXUDATE, COVER A WOUND CAUSED BY VASCULAR AND NONVASCULAR PERCUTANEOUS MEDICAL DEVICES DURING SURGERY, AS WELL AS REDUCE LOCAL INFECTION AND COLONIZATION OF MICROORGANISMS.: Brand: BIOPATCH

## (undated) DEVICE — SOL IRR NACL 0.9PCT BT 1000ML

## (undated) DEVICE — GLV SURG BIOGEL LTX PF 7 1/2

## (undated) DEVICE — PK UNIV COMPL 40

## (undated) DEVICE — MARKR SKIN W/RULR AND LBL

## (undated) DEVICE — GW HITORQUE/BAL MID/WT J W/HCOAT .014 3X190CM

## (undated) DEVICE — DEV TUNNELING SUBCONTANIOUS

## (undated) DEVICE — SYR SLP TP 10ML DISP

## (undated) DEVICE — SPONGE,DISSECTOR,K,XRAY,9/16"X1/4",STRL: Brand: MEDLINE

## (undated) DEVICE — SUT PROLN 5/0 V5 36IN 8934H

## (undated) DEVICE — DRP SLUSH WARMR MACH CIR 44X44IN

## (undated) DEVICE — APPL CHLORAPREP HI/LITE 26ML ORNG

## (undated) DEVICE — NEEDLE, QUINCKE, 20GX3.5": Brand: MEDLINE

## (undated) DEVICE — MSK ENDO PORT O2 POM ELITE CURAPLEX A/

## (undated) DEVICE — ENDOCUT SCISSOR TIP, DISPOSABLE: Brand: RENEW

## (undated) DEVICE — SUT PROLN MO.5 7/0 DBLARM BV175 6 2X30 BX/12

## (undated) DEVICE — CONTAINER,SPECIMEN,OR STERILE,4OZ: Brand: MEDLINE

## (undated) DEVICE — LOU MINOR PROCEDURE: Brand: MEDLINE INDUSTRIES, INC.

## (undated) DEVICE — AVID DUAL STAGE VENOUS DRAINAGE CANNULA: Brand: AVID DUAL STAGE VENOUS DRAINAGE CANNULA

## (undated) DEVICE — GLIDESHEATH BASIC HYDROPHILIC COATED INTRODUCER SHEATH: Brand: GLIDESHEATH

## (undated) DEVICE — GLV SURG BIOGEL LTX PF 7

## (undated) DEVICE — SYS PERFUS SEP PLATLT W TIPS CUST

## (undated) DEVICE — SYR LUERLOK 20CC BX/50

## (undated) DEVICE — INTENDED FOR TISSUE SEPARATION, AND OTHER PROCEDURES THAT REQUIRE A SHARP SURGICAL BLADE TO PUNCTURE OR CUT.: Brand: BARD-PARKER ® CARBON RIB-BACK BLADES

## (undated) DEVICE — PK PERFUS W/TB CUST ADLT

## (undated) DEVICE — BITEBLOCK OMNI BLOC

## (undated) DEVICE — CATH DIAG IMPULSE FL3.5 5F 100CM

## (undated) DEVICE — CANN ART EOPA 3D NV W/CONN 20F

## (undated) DEVICE — BG TRANSF W/COUPLER SPK 600ML

## (undated) DEVICE — OASIS DRAIN, SINGLE, INLINE & ATS COMPATIBLE: Brand: OASIS

## (undated) DEVICE — CLAMP INSERT: Brand: STEALTH® CLAMP INSERT

## (undated) DEVICE — CANN RETRGR STYLET RSCP 15F

## (undated) DEVICE — SUT SILK 2/0 SH CR8 18IN CR8 C012D

## (undated) DEVICE — 3M™ STERI-STRIP™ COMPOUND BENZOIN TINCTURE 40 BAGS/CARTON 4 CARTONS/CASE C1544: Brand: 3M™ STERI-STRIP™

## (undated) DEVICE — LOU OPEN HEART DR POLLOCK: Brand: MEDLINE INDUSTRIES, INC.

## (undated) DEVICE — ENDOPATH XCEL BLADELESS TROCARS WITH STABILITY SLEEVES: Brand: ENDOPATH XCEL

## (undated) DEVICE — GOWN,NON-REINFORCED,SIRUS,SET IN SLV,XXL: Brand: MEDLINE

## (undated) DEVICE — TBG PENCL TELESCP MEGADYNE SMOKE EVAC 10FT

## (undated) DEVICE — 3M™ IOBAN™ 2 ANTIMICROBIAL INCISE DRAPE 6650EZ: Brand: IOBAN™ 2

## (undated) DEVICE — DRSNG WND GEL FIBR OPTICELL AG PLS W/SLV LF 4X5IN  STRL

## (undated) DEVICE — TOWEL,OR,DSP,ST,WHITE,DLX,4/PK,20PK/CS: Brand: MEDLINE

## (undated) DEVICE — LABEL SHEET CUSTOM 2X2 YELLOW: Brand: MEDLINE INDUSTRIES, INC.

## (undated) DEVICE — GLV SURG SENSICARE PI LF PF 7.5 GRN STRL

## (undated) DEVICE — SUT PROLN 4/0 V5 36IN 8935H

## (undated) DEVICE — GLV SURG BIOGEL LTX PF 6 1/2

## (undated) DEVICE — GLIDESHEATH SLENDER STAINLESS STEEL KIT: Brand: GLIDESHEATH SLENDER

## (undated) DEVICE — GOWN,SIRUS,NON REINFRCD,LARGE,SET IN SL: Brand: MEDLINE

## (undated) DEVICE — SOL ISO/ALC 70PCT 4OZ

## (undated) DEVICE — KT CATH TAL PALINDROME SAPPHIRE 14.5F23CM

## (undated) DEVICE — LP VESL MAXI 2.5X1MM RED 2PK

## (undated) DEVICE — PATIENT RETURN ELECTRODE, SINGLE-USE, CONTACT QUALITY MONITORING, ADULT, WITH 9FT CORD, FOR PATIENTS WEIGING OVER 33LBS. (15KG): Brand: MEGADYNE

## (undated) DEVICE — ELECTRD BLD EZ CLN MOD XLNG 2.75IN

## (undated) DEVICE — SUT PROLN 3/0 V7 D/A 36IN 8976H

## (undated) DEVICE — CONN STR 1/2INX3/8IN

## (undated) DEVICE — SENSR CERBRL O2 PK/2

## (undated) DEVICE — Device: Brand: LEVEL 1

## (undated) DEVICE — CAUTRY ACU/DISPO FINETP HI/TEMP 2IN DISP STRL

## (undated) DEVICE — SINGLE-USE BIOPSY FORCEPS: Brand: RADIAL JAW 4

## (undated) DEVICE — SUT MNCRYL PLS ANTIB UD 4/0 PS2 18IN

## (undated) DEVICE — CANN VESL CORNRY STR W/6MM BALN

## (undated) DEVICE — LN SMPL CO2 SHTRM SD STREAM W/M LUER

## (undated) DEVICE — ADHS SKIN SURG TISS VISC PREMIERPRO EXOFIN HI/VISC FAST/DRY

## (undated) DEVICE — STPLR SKIN VISISTAT WD 35CT

## (undated) DEVICE — SUT SILK 2/0 TIES 18IN A185H

## (undated) DEVICE — BLOWER/MISTER AXIOUS OPCAB W/TBG

## (undated) DEVICE — SUT ETHLN 2/0 PS 18IN 585H

## (undated) DEVICE — SYR LUERLOK 30CC

## (undated) DEVICE — CATH VENT MIV RADL PIG ST TIP 5F 110CM

## (undated) DEVICE — SUT PROLN 4/0 BB D/A 36IN 8581H

## (undated) DEVICE — CANN VESL CORNRY STR W/4MM BALN

## (undated) DEVICE — PK ATS CUST W CARDIOTOMY RESEVOIR

## (undated) DEVICE — 12 FOOT DISPOSABLE EXTENSION CABLE WITH SAFE CONNECT / SCREW-DOWN

## (undated) DEVICE — TUBING, SUCTION, 1/4" X 10', STRAIGHT: Brand: MEDLINE

## (undated) DEVICE — OPTIFOAM GENTLE SA, POSTOP, 4X12: Brand: MEDLINE

## (undated) DEVICE — 28 FR RIGHT ANGLE – SOFT PVC CATHETER: Brand: PVC THORACIC CATHETERS

## (undated) DEVICE — CANN AORT ROOT DLP VNT/8IN 14G 7F

## (undated) DEVICE — CATH DIAG IMPULSE FR4 5F 100CM

## (undated) DEVICE — 8 FOOT DISPOSABLE EXTENSION CABLE WITH SAFE CONNECT / ALLIGATOR CLIP

## (undated) DEVICE — PK HEART OPN 40

## (undated) DEVICE — TBG ART PRESS 60 IN

## (undated) DEVICE — TBG INSUFFLATION LUER LOCK: Brand: MEDLINE INDUSTRIES, INC.

## (undated) DEVICE — 28 FR STRAIGHT – SOFT PVC CATHETER: Brand: PVC THORACIC CATHETERS

## (undated) DEVICE — DRP SLUSH WARMR MACH RECTG 66X44IN

## (undated) DEVICE — ADAPT ANTEGRADE RETRGR